# Patient Record
Sex: FEMALE | Race: WHITE | NOT HISPANIC OR LATINO | Employment: OTHER | URBAN - METROPOLITAN AREA
[De-identification: names, ages, dates, MRNs, and addresses within clinical notes are randomized per-mention and may not be internally consistent; named-entity substitution may affect disease eponyms.]

---

## 2017-03-06 ENCOUNTER — CONVERSION ENCOUNTER (OUTPATIENT)
Dept: MAMMOGRAPHY | Facility: HOSPITAL | Age: 82
End: 2017-03-06

## 2018-02-28 ENCOUNTER — OFFICE VISIT CONVERTED (OUTPATIENT)
Dept: INTERNAL MEDICINE | Facility: CLINIC | Age: 83
End: 2018-02-28
Attending: INTERNAL MEDICINE

## 2018-04-24 ENCOUNTER — CONVERSION ENCOUNTER (OUTPATIENT)
Dept: MAMMOGRAPHY | Facility: HOSPITAL | Age: 83
End: 2018-04-24

## 2018-04-24 ENCOUNTER — OFFICE VISIT CONVERTED (OUTPATIENT)
Dept: CARDIOLOGY | Facility: CLINIC | Age: 83
End: 2018-04-24
Attending: NURSE PRACTITIONER

## 2018-05-24 ENCOUNTER — OFFICE VISIT CONVERTED (OUTPATIENT)
Dept: INTERNAL MEDICINE | Facility: CLINIC | Age: 83
End: 2018-05-24
Attending: INTERNAL MEDICINE

## 2018-06-11 ENCOUNTER — OFFICE VISIT CONVERTED (OUTPATIENT)
Dept: CARDIOLOGY | Facility: CLINIC | Age: 83
End: 2018-06-11
Attending: INTERNAL MEDICINE

## 2018-08-27 ENCOUNTER — OFFICE VISIT CONVERTED (OUTPATIENT)
Dept: INTERNAL MEDICINE | Facility: CLINIC | Age: 83
End: 2018-08-27
Attending: INTERNAL MEDICINE

## 2018-12-18 ENCOUNTER — OFFICE VISIT CONVERTED (OUTPATIENT)
Dept: CARDIOLOGY | Facility: CLINIC | Age: 83
End: 2018-12-18
Attending: NURSE PRACTITIONER

## 2018-12-20 ENCOUNTER — OFFICE VISIT CONVERTED (OUTPATIENT)
Dept: INTERNAL MEDICINE | Facility: CLINIC | Age: 83
End: 2018-12-20
Attending: NURSE PRACTITIONER

## 2018-12-20 ENCOUNTER — CONVERSION ENCOUNTER (OUTPATIENT)
Dept: INTERNAL MEDICINE | Facility: CLINIC | Age: 83
End: 2018-12-20

## 2019-01-08 ENCOUNTER — HOSPITAL ENCOUNTER (OUTPATIENT)
Dept: CT IMAGING | Facility: HOSPITAL | Age: 84
Discharge: HOME | End: 2019-01-08
Attending: NURSE PRACTITIONER

## 2019-01-10 ENCOUNTER — HOSPITAL ENCOUNTER (OUTPATIENT)
Dept: CARDIOLOGY | Facility: HOSPITAL | Age: 84
Discharge: HOME | End: 2019-01-10
Attending: SURGERY

## 2019-02-18 ENCOUNTER — HOSPITAL ENCOUNTER (OUTPATIENT)
Dept: OTHER | Facility: HOSPITAL | Age: 84
Discharge: HOME | End: 2019-02-18
Attending: INTERNAL MEDICINE

## 2019-02-18 LAB
ALBUMIN SERPL-MCNC: 4.2 G/DL (ref 3.5–5)
ALBUMIN/GLOB SERPL: 1.4 {RATIO} (ref 1.4–2.6)
ALP SERPL-CCNC: 58 U/L (ref 43–160)
ALT SERPL-CCNC: 14 U/L (ref 10–40)
ANION GAP SERPL CALC-SCNC: 18 MMOL/L (ref 8–19)
AST SERPL-CCNC: 18 U/L (ref 15–50)
BASOPHILS # BLD AUTO: 0.06 10*3/UL (ref 0–0.2)
BASOPHILS NFR BLD AUTO: 1 % (ref 0–3)
BILIRUB SERPL-MCNC: 0.28 MG/DL (ref 0.2–1.3)
BUN SERPL-MCNC: 30 MG/DL (ref 5–25)
BUN/CREAT SERPL: 20 {RATIO} (ref 6–20)
CALCIUM SERPL-MCNC: 9.6 MG/DL (ref 8.7–10.4)
CHLORIDE SERPL-SCNC: 105 MMOL/L (ref 99–111)
CHOLEST SERPL-MCNC: 161 MG/DL (ref 107–200)
CHOLEST/HDLC SERPL: 3.3 {RATIO} (ref 3–6)
CONV ABS IMM GRAN: 0.02 10*3/UL (ref 0–0.2)
CONV CO2: 26 MMOL/L (ref 22–32)
CONV CREATININE URINE, RANDOM: 107 MG/DL (ref 10–300)
CONV IMMATURE GRAN: 0.3 % (ref 0–1.8)
CONV MICROALBUM.,U,RANDOM: 19.8 MG/L (ref 0–20)
CONV TOTAL PROTEIN: 7.1 G/DL (ref 6.3–8.2)
CREAT UR-MCNC: 1.48 MG/DL (ref 0.5–0.9)
DEPRECATED RDW RBC AUTO: 46.5 FL (ref 36.4–46.3)
EOSINOPHIL # BLD AUTO: 0.12 10*3/UL (ref 0–0.7)
EOSINOPHIL # BLD AUTO: 2.1 % (ref 0–7)
ERYTHROCYTE [DISTWIDTH] IN BLOOD BY AUTOMATED COUNT: 13.1 % (ref 11.7–14.4)
EST. AVERAGE GLUCOSE BLD GHB EST-MCNC: 137 MG/DL
GFR SERPLBLD BASED ON 1.73 SQ M-ARVRAT: 33 ML/MIN/{1.73_M2}
GLOBULIN UR ELPH-MCNC: 2.9 G/DL (ref 2–3.5)
GLUCOSE SERPL-MCNC: 123 MG/DL (ref 65–99)
HBA1C MFR BLD: 11.3 G/DL (ref 12–16)
HBA1C MFR BLD: 6.4 % (ref 3.5–5.7)
HCT VFR BLD AUTO: 36.1 % (ref 37–47)
HDLC SERPL-MCNC: 49 MG/DL (ref 40–60)
LDLC SERPL CALC-MCNC: 85 MG/DL (ref 70–100)
LYMPHOCYTES # BLD AUTO: 0.95 10*3/UL (ref 1–5)
MCH RBC QN AUTO: 30.2 PG (ref 27–31)
MCHC RBC AUTO-ENTMCNC: 31.3 G/DL (ref 33–37)
MCV RBC AUTO: 96.5 FL (ref 81–99)
MICROALBUMIN/CREAT UR: 18.5 MG/G{CRE} (ref 0–35)
MONOCYTES # BLD AUTO: 0.76 10*3/UL (ref 0.2–1.2)
MONOCYTES NFR BLD AUTO: 13.3 % (ref 3–10)
NEUTROPHILS # BLD AUTO: 3.82 10*3/UL (ref 2–8)
NEUTROPHILS NFR BLD AUTO: 66.7 % (ref 30–85)
NRBC CBCN: 0 % (ref 0–0.7)
OSMOLALITY SERPL CALC.SUM OF ELEC: 306 MOSM/KG (ref 273–304)
PLATELET # BLD AUTO: 380 10*3/UL (ref 130–400)
PMV BLD AUTO: 10.1 FL (ref 9.4–12.3)
POTASSIUM SERPL-SCNC: 4.9 MMOL/L (ref 3.5–5.3)
RBC # BLD AUTO: 3.74 10*6/UL (ref 4.2–5.4)
SODIUM SERPL-SCNC: 144 MMOL/L (ref 135–147)
TRIGL SERPL-MCNC: 135 MG/DL (ref 40–150)
VARIANT LYMPHS NFR BLD MANUAL: 16.6 % (ref 20–45)
VLDLC SERPL-MCNC: 27 MG/DL (ref 5–37)
WBC # BLD AUTO: 5.73 10*3/UL (ref 4.8–10.8)

## 2019-02-21 LAB
CONV EBV EARLY ANTIGEN: <5 U/ML (ref 0–10.9)
CONV EBV NUCLEAR ANTIGEN: 221 U/ML (ref 0–21.9)
CONV EPSTEIN BARR VIRAL CAPSID IGM: <10 U/ML (ref 0–43.9)
CONV EPSTEIN BARR VIRUS ANTIBODY TO VIRAL CAPSID IGG: 724 U/ML (ref 0–21.9)

## 2019-02-27 ENCOUNTER — HOSPITAL ENCOUNTER (OUTPATIENT)
Dept: OTHER | Facility: HOSPITAL | Age: 84
Discharge: HOME | End: 2019-02-27
Attending: INTERNAL MEDICINE

## 2019-02-27 ENCOUNTER — OFFICE VISIT CONVERTED (OUTPATIENT)
Dept: INTERNAL MEDICINE | Facility: CLINIC | Age: 84
End: 2019-02-27
Attending: INTERNAL MEDICINE

## 2019-03-12 ENCOUNTER — CONVERSION ENCOUNTER (OUTPATIENT)
Dept: OTHER | Facility: HOSPITAL | Age: 84
End: 2019-03-12

## 2019-03-12 ENCOUNTER — OFFICE VISIT CONVERTED (OUTPATIENT)
Dept: INTERNAL MEDICINE | Facility: CLINIC | Age: 84
End: 2019-03-12
Attending: PHYSICIAN ASSISTANT

## 2019-03-12 ENCOUNTER — HOSPITAL ENCOUNTER (OUTPATIENT)
Dept: OTHER | Facility: HOSPITAL | Age: 84
Discharge: HOME | End: 2019-03-12
Attending: PHYSICIAN ASSISTANT

## 2019-03-12 LAB
BASOPHILS # BLD AUTO: 0.06 10*3/UL (ref 0–0.2)
BASOPHILS NFR BLD AUTO: 0.7 % (ref 0–3)
CK SERPL-CCNC: 49 U/L (ref 35–230)
CONV ABS IMM GRAN: 0.05 10*3/UL (ref 0–0.2)
CONV IMMATURE GRAN: 0.5 % (ref 0–1.8)
DEPRECATED RDW RBC AUTO: 44.5 FL (ref 36.4–46.3)
EOSINOPHIL # BLD AUTO: 0.11 10*3/UL (ref 0–0.7)
EOSINOPHIL # BLD AUTO: 1.2 % (ref 0–7)
ERYTHROCYTE [DISTWIDTH] IN BLOOD BY AUTOMATED COUNT: 13.1 % (ref 11.7–14.4)
FOLATE SERPL-MCNC: 9.9 NG/ML (ref 4.8–20)
HBA1C MFR BLD: 10.8 G/DL (ref 12–16)
HCT VFR BLD AUTO: 34 % (ref 37–47)
IRON SATN MFR SERPL: 24 % (ref 20–55)
IRON SERPL-MCNC: 77 UG/DL (ref 60–170)
LYMPHOCYTES # BLD AUTO: 0.92 10*3/UL (ref 1–5)
MCH RBC QN AUTO: 30.2 PG (ref 27–31)
MCHC RBC AUTO-ENTMCNC: 31.8 G/DL (ref 33–37)
MCV RBC AUTO: 95 FL (ref 81–99)
MONOCYTES # BLD AUTO: 0.92 10*3/UL (ref 0.2–1.2)
MONOCYTES NFR BLD AUTO: 10.1 % (ref 3–10)
NEUTROPHILS # BLD AUTO: 7.06 10*3/UL (ref 2–8)
NEUTROPHILS NFR BLD AUTO: 77.4 % (ref 30–85)
NRBC CBCN: 0 % (ref 0–0.7)
PLATELET # BLD AUTO: 377 10*3/UL (ref 130–400)
PMV BLD AUTO: 10 FL (ref 9.4–12.3)
RBC # BLD AUTO: 3.58 10*6/UL (ref 4.2–5.4)
TIBC SERPL-MCNC: 316 UG/DL (ref 245–450)
TRANSFERRIN SERPL-MCNC: 221 MG/DL (ref 250–380)
TSH SERPL-ACNC: 1.31 M[IU]/L (ref 0.27–4.2)
VARIANT LYMPHS NFR BLD MANUAL: 10.1 % (ref 20–45)
VIT B12 SERPL-MCNC: 904 PG/ML (ref 211–911)
WBC # BLD AUTO: 9.12 10*3/UL (ref 4.8–10.8)

## 2019-03-14 LAB — BACTERIA UR CULT: NORMAL

## 2019-04-15 ENCOUNTER — OFFICE VISIT CONVERTED (OUTPATIENT)
Dept: INTERNAL MEDICINE | Facility: CLINIC | Age: 84
End: 2019-04-15
Attending: INTERNAL MEDICINE

## 2019-04-15 ENCOUNTER — CONVERSION ENCOUNTER (OUTPATIENT)
Dept: INTERNAL MEDICINE | Facility: CLINIC | Age: 84
End: 2019-04-15

## 2019-04-25 ENCOUNTER — OFFICE VISIT CONVERTED (OUTPATIENT)
Dept: CARDIOLOGY | Facility: CLINIC | Age: 84
End: 2019-04-25
Attending: INTERNAL MEDICINE

## 2019-04-25 ENCOUNTER — CONVERSION ENCOUNTER (OUTPATIENT)
Dept: CARDIOLOGY | Facility: CLINIC | Age: 84
End: 2019-04-25

## 2019-05-01 ENCOUNTER — HOSPITAL ENCOUNTER (OUTPATIENT)
Dept: CT IMAGING | Facility: HOSPITAL | Age: 84
Discharge: HOME | End: 2019-05-01
Attending: INTERNAL MEDICINE

## 2019-05-03 ENCOUNTER — CONVERSION ENCOUNTER (OUTPATIENT)
Dept: INTERNAL MEDICINE | Facility: CLINIC | Age: 84
End: 2019-05-03

## 2019-05-03 ENCOUNTER — HOSPITAL ENCOUNTER (OUTPATIENT)
Dept: OTHER | Facility: HOSPITAL | Age: 84
Discharge: HOME | End: 2019-05-03
Attending: PHYSICIAN ASSISTANT

## 2019-05-03 ENCOUNTER — OFFICE VISIT CONVERTED (OUTPATIENT)
Dept: INTERNAL MEDICINE | Facility: CLINIC | Age: 84
End: 2019-05-03
Attending: PHYSICIAN ASSISTANT

## 2019-05-03 LAB
BASOPHILS # BLD AUTO: 0.03 10*3/UL (ref 0–0.2)
BASOPHILS NFR BLD AUTO: 0.4 % (ref 0–3)
CONV ABS IMM GRAN: 0.02 10*3/UL (ref 0–0.2)
CONV IMMATURE GRAN: 0.2 % (ref 0–1.8)
CRP SERPL-MCNC: 4.4 MG/L (ref 0–5)
DEPRECATED RDW RBC AUTO: 53 FL (ref 36.4–46.3)
EOSINOPHIL # BLD AUTO: 0.13 10*3/UL (ref 0–0.7)
EOSINOPHIL # BLD AUTO: 1.6 % (ref 0–7)
ERYTHROCYTE [DISTWIDTH] IN BLOOD BY AUTOMATED COUNT: 15.3 % (ref 11.7–14.4)
ERYTHROCYTE [SEDIMENTATION RATE] IN BLOOD: 58 MM/H (ref 0–30)
HBA1C MFR BLD: 10.7 G/DL (ref 12–16)
HCT VFR BLD AUTO: 34.8 % (ref 37–47)
IRON SATN MFR SERPL: 23 % (ref 20–55)
IRON SERPL-MCNC: 58 UG/DL (ref 60–170)
LYMPHOCYTES # BLD AUTO: 0.87 10*3/UL (ref 1–5)
MCH RBC QN AUTO: 29.1 PG (ref 27–31)
MCHC RBC AUTO-ENTMCNC: 30.7 G/DL (ref 33–37)
MCV RBC AUTO: 94.6 FL (ref 81–99)
MONOCYTES # BLD AUTO: 0.77 10*3/UL (ref 0.2–1.2)
MONOCYTES NFR BLD AUTO: 9.4 % (ref 3–10)
NEUTROPHILS # BLD AUTO: 6.33 10*3/UL (ref 2–8)
NEUTROPHILS NFR BLD AUTO: 77.7 % (ref 30–85)
NRBC CBCN: 0 % (ref 0–0.7)
PLATELET # BLD AUTO: 315 10*3/UL (ref 130–400)
PMV BLD AUTO: 9.8 FL (ref 9.4–12.3)
RBC # BLD AUTO: 3.68 10*6/UL (ref 4.2–5.4)
TIBC SERPL-MCNC: 250 UG/DL (ref 245–450)
TRANSFERRIN SERPL-MCNC: 175 MG/DL (ref 250–380)
TSH SERPL-ACNC: 3.32 M[IU]/L (ref 0.27–4.2)
VARIANT LYMPHS NFR BLD MANUAL: 10.7 % (ref 20–45)
WBC # BLD AUTO: 8.15 10*3/UL (ref 4.8–10.8)

## 2019-05-05 LAB — CONV CELIAC DISEASE AB-IGA: 287 MG/DL (ref 68–408)

## 2019-05-06 LAB
CONV ANTI NUCLEAR ANTIBODY WITH REFLEX: NEGATIVE
TTG IGA SER-ACNC: 1 U/ML (ref 0–3)

## 2019-05-09 ENCOUNTER — HOSPITAL ENCOUNTER (OUTPATIENT)
Dept: ULTRASOUND IMAGING | Facility: HOSPITAL | Age: 84
Discharge: HOME | End: 2019-05-09
Attending: PHYSICIAN ASSISTANT

## 2019-05-16 ENCOUNTER — OFFICE VISIT CONVERTED (OUTPATIENT)
Dept: GASTROENTEROLOGY | Facility: CLINIC | Age: 84
End: 2019-05-16
Attending: PHYSICIAN ASSISTANT

## 2019-05-28 ENCOUNTER — HOSPITAL ENCOUNTER (OUTPATIENT)
Dept: OTHER | Facility: HOSPITAL | Age: 84
Discharge: HOME | End: 2019-05-28
Attending: INTERNAL MEDICINE

## 2019-05-28 LAB
25(OH)D3 SERPL-MCNC: 33.5 NG/ML (ref 30–100)
ALBUMIN SERPL-MCNC: 4.1 G/DL (ref 3.5–5)
ANION GAP SERPL CALC-SCNC: 17 MMOL/L (ref 8–19)
BASOPHILS # BLD AUTO: 0.06 10*3/UL (ref 0–0.2)
BASOPHILS NFR BLD AUTO: 0.9 % (ref 0–3)
BUN SERPL-MCNC: 21 MG/DL (ref 5–25)
BUN/CREAT SERPL: 16 {RATIO} (ref 6–20)
CALCIUM SERPL-MCNC: 9.2 MG/DL (ref 8.7–10.4)
CHLORIDE SERPL-SCNC: 104 MMOL/L (ref 99–111)
CONV ABS IMM GRAN: 0.03 10*3/UL (ref 0–0.2)
CONV CO2: 25 MMOL/L (ref 22–32)
CONV IMMATURE GRAN: 0.5 % (ref 0–1.8)
CREAT UR-MCNC: 1.31 MG/DL (ref 0.5–0.9)
DEPRECATED RDW RBC AUTO: 57.3 FL (ref 36.4–46.3)
EOSINOPHIL # BLD AUTO: 0.19 10*3/UL (ref 0–0.7)
EOSINOPHIL # BLD AUTO: 2.9 % (ref 0–7)
ERYTHROCYTE [DISTWIDTH] IN BLOOD BY AUTOMATED COUNT: 16 % (ref 11.7–14.4)
GFR SERPLBLD BASED ON 1.73 SQ M-ARVRAT: 38 ML/MIN/{1.73_M2}
GLUCOSE SERPL-MCNC: 98 MG/DL (ref 65–99)
HBA1C MFR BLD: 11.7 G/DL (ref 12–16)
HCT VFR BLD AUTO: 38.4 % (ref 37–47)
LYMPHOCYTES # BLD AUTO: 1.11 10*3/UL (ref 1–5)
MCH RBC QN AUTO: 29.2 PG (ref 27–31)
MCHC RBC AUTO-ENTMCNC: 30.5 G/DL (ref 33–37)
MCV RBC AUTO: 95.8 FL (ref 81–99)
MONOCYTES # BLD AUTO: 0.87 10*3/UL (ref 0.2–1.2)
MONOCYTES NFR BLD AUTO: 13.4 % (ref 3–10)
NEUTROPHILS # BLD AUTO: 4.25 10*3/UL (ref 2–8)
NEUTROPHILS NFR BLD AUTO: 65.2 % (ref 30–85)
NRBC CBCN: 0 % (ref 0–0.7)
PHOSPHATE SERPL-MCNC: 4.4 MG/DL (ref 2.4–4.5)
PLATELET # BLD AUTO: 323 10*3/UL (ref 130–400)
PMV BLD AUTO: 9.6 FL (ref 9.4–12.3)
POTASSIUM SERPL-SCNC: 5.2 MMOL/L (ref 3.5–5.3)
PTH-INTACT SERPL-MCNC: 51.2 PG/ML (ref 11.1–79.5)
RBC # BLD AUTO: 4.01 10*6/UL (ref 4.2–5.4)
SODIUM SERPL-SCNC: 141 MMOL/L (ref 135–147)
VARIANT LYMPHS NFR BLD MANUAL: 17.1 % (ref 20–45)
WBC # BLD AUTO: 6.51 10*3/UL (ref 4.8–10.8)

## 2019-05-29 ENCOUNTER — OFFICE VISIT CONVERTED (OUTPATIENT)
Dept: INTERNAL MEDICINE | Facility: CLINIC | Age: 84
End: 2019-05-29
Attending: PHYSICIAN ASSISTANT

## 2019-05-29 LAB
APPEARANCE UR: CLEAR
BILIRUB UR QL: NEGATIVE
COLOR UR: YELLOW
CONV BACTERIA: NEGATIVE
CONV COLLECTION SOURCE (UA): ABNORMAL
CONV CREATININE URINE, RANDOM: 62 MG/DL (ref 10–300)
CONV HYALINE CASTS IN URINE MICRO: ABNORMAL /[LPF]
CONV UROBILINOGEN IN URINE BY AUTOMATED TEST STRIP: 0.2 {EHRLICHU}/DL (ref 0.1–1)
GLUCOSE UR QL: NEGATIVE MG/DL
HGB UR QL STRIP: ABNORMAL
KETONES UR QL STRIP: NEGATIVE MG/DL
LEUKOCYTE ESTERASE UR QL STRIP: ABNORMAL
NITRITE UR QL STRIP: NEGATIVE
PH UR STRIP.AUTO: 6.5 [PH] (ref 5–8)
PROT UR QL: NEGATIVE MG/DL
PROT UR-MCNC: 6.2 MG/DL
RBC #/AREA URNS HPF: ABNORMAL /[HPF]
SP GR UR: 1.01 (ref 1–1.03)
SQUAMOUS SPT QL MICRO: ABNORMAL /[HPF]
WBC #/AREA URNS HPF: ABNORMAL /[HPF]

## 2019-06-06 ENCOUNTER — HOSPITAL ENCOUNTER (OUTPATIENT)
Dept: GASTROENTEROLOGY | Facility: HOSPITAL | Age: 84
Setting detail: HOSPITAL OUTPATIENT SURGERY
Discharge: HOME | End: 2019-06-06
Attending: INTERNAL MEDICINE

## 2019-06-06 LAB — GLUCOSE BLD-MCNC: 114 MG/DL (ref 65–99)

## 2019-07-03 ENCOUNTER — HOSPITAL ENCOUNTER (OUTPATIENT)
Dept: MAMMOGRAPHY | Facility: HOSPITAL | Age: 84
Discharge: HOME | End: 2019-07-03
Attending: PHYSICIAN ASSISTANT

## 2019-08-01 ENCOUNTER — OFFICE VISIT CONVERTED (OUTPATIENT)
Dept: GASTROENTEROLOGY | Facility: CLINIC | Age: 84
End: 2019-08-01
Attending: PHYSICIAN ASSISTANT

## 2019-08-06 ENCOUNTER — HOSPITAL ENCOUNTER (OUTPATIENT)
Dept: OTHER | Facility: HOSPITAL | Age: 84
Discharge: HOME | End: 2019-08-06
Attending: PHYSICIAN ASSISTANT

## 2019-08-06 LAB
ALBUMIN SERPL-MCNC: 4.3 G/DL (ref 3.5–5)
ALBUMIN/GLOB SERPL: 1.6 {RATIO} (ref 1.4–2.6)
ALP SERPL-CCNC: 107 U/L (ref 43–160)
ALT SERPL-CCNC: 13 U/L (ref 10–40)
ANION GAP SERPL CALC-SCNC: 21 MMOL/L (ref 8–19)
AST SERPL-CCNC: 16 U/L (ref 15–50)
BASOPHILS # BLD AUTO: 0.07 10*3/UL (ref 0–0.2)
BASOPHILS NFR BLD AUTO: 1.2 % (ref 0–3)
BILIRUB SERPL-MCNC: 0.21 MG/DL (ref 0.2–1.3)
BUN SERPL-MCNC: 30 MG/DL (ref 5–25)
BUN/CREAT SERPL: 22 {RATIO} (ref 6–20)
CALCIUM SERPL-MCNC: 9.5 MG/DL (ref 8.7–10.4)
CHLORIDE SERPL-SCNC: 104 MMOL/L (ref 99–111)
CHOLEST SERPL-MCNC: 144 MG/DL (ref 107–200)
CHOLEST/HDLC SERPL: 2.3 {RATIO} (ref 3–6)
CONV ABS IMM GRAN: 0.02 10*3/UL (ref 0–0.2)
CONV CO2: 23 MMOL/L (ref 22–32)
CONV IMMATURE GRAN: 0.3 % (ref 0–1.8)
CONV TOTAL PROTEIN: 7 G/DL (ref 6.3–8.2)
CREAT UR-MCNC: 1.35 MG/DL (ref 0.5–0.9)
DEPRECATED RDW RBC AUTO: 47.8 FL (ref 36.4–46.3)
EOSINOPHIL # BLD AUTO: 0.19 10*3/UL (ref 0–0.7)
EOSINOPHIL # BLD AUTO: 3.1 % (ref 0–7)
ERYTHROCYTE [DISTWIDTH] IN BLOOD BY AUTOMATED COUNT: 13.5 % (ref 11.7–14.4)
EST. AVERAGE GLUCOSE BLD GHB EST-MCNC: 120 MG/DL
GFR SERPLBLD BASED ON 1.73 SQ M-ARVRAT: 36 ML/MIN/{1.73_M2}
GLOBULIN UR ELPH-MCNC: 2.7 G/DL (ref 2–3.5)
GLUCOSE SERPL-MCNC: 116 MG/DL (ref 65–99)
HBA1C MFR BLD: 5.8 % (ref 3.5–5.7)
HCT VFR BLD AUTO: 38.7 % (ref 37–47)
HDLC SERPL-MCNC: 63 MG/DL (ref 40–60)
HGB BLD-MCNC: 11.8 G/DL (ref 12–16)
IRON SATN MFR SERPL: 30 % (ref 20–55)
IRON SERPL-MCNC: 95 UG/DL (ref 60–170)
LDLC SERPL CALC-MCNC: 55 MG/DL (ref 70–100)
LYMPHOCYTES # BLD AUTO: 1.06 10*3/UL (ref 1–5)
LYMPHOCYTES NFR BLD AUTO: 17.4 % (ref 20–45)
MCH RBC QN AUTO: 29.7 PG (ref 27–31)
MCHC RBC AUTO-ENTMCNC: 30.5 G/DL (ref 33–37)
MCV RBC AUTO: 97.5 FL (ref 81–99)
MONOCYTES # BLD AUTO: 0.89 10*3/UL (ref 0.2–1.2)
MONOCYTES NFR BLD AUTO: 14.6 % (ref 3–10)
NEUTROPHILS # BLD AUTO: 3.85 10*3/UL (ref 2–8)
NEUTROPHILS NFR BLD AUTO: 63.4 % (ref 30–85)
NRBC CBCN: 0 % (ref 0–0.7)
OSMOLALITY SERPL CALC.SUM OF ELEC: 303 MOSM/KG (ref 273–304)
PLATELET # BLD AUTO: 317 10*3/UL (ref 130–400)
PMV BLD AUTO: 9.7 FL (ref 9.4–12.3)
POTASSIUM SERPL-SCNC: 5.4 MMOL/L (ref 3.5–5.3)
RBC # BLD AUTO: 3.97 10*6/UL (ref 4.2–5.4)
SODIUM SERPL-SCNC: 143 MMOL/L (ref 135–147)
TIBC SERPL-MCNC: 317 UG/DL (ref 245–450)
TRANSFERRIN SERPL-MCNC: 222 MG/DL (ref 250–380)
TRIGL SERPL-MCNC: 128 MG/DL (ref 40–150)
VLDLC SERPL-MCNC: 26 MG/DL (ref 5–37)
WBC # BLD AUTO: 6.08 10*3/UL (ref 4.8–10.8)

## 2019-08-30 ENCOUNTER — CONVERSION ENCOUNTER (OUTPATIENT)
Dept: INTERNAL MEDICINE | Facility: CLINIC | Age: 84
End: 2019-08-30

## 2019-08-30 ENCOUNTER — OFFICE VISIT CONVERTED (OUTPATIENT)
Dept: INTERNAL MEDICINE | Facility: CLINIC | Age: 84
End: 2019-08-30
Attending: INTERNAL MEDICINE

## 2019-09-05 ENCOUNTER — OFFICE VISIT CONVERTED (OUTPATIENT)
Dept: CARDIOLOGY | Facility: CLINIC | Age: 84
End: 2019-09-05
Attending: INTERNAL MEDICINE

## 2019-09-09 ENCOUNTER — HOSPITAL ENCOUNTER (OUTPATIENT)
Dept: OTHER | Facility: HOSPITAL | Age: 84
Discharge: HOME | End: 2019-09-09
Attending: INTERNAL MEDICINE

## 2019-09-09 LAB — BNP SERPL-MCNC: 424 PG/ML (ref 0–1800)

## 2019-09-26 ENCOUNTER — HOSPITAL ENCOUNTER (OUTPATIENT)
Dept: CARDIOLOGY | Facility: HOSPITAL | Age: 84
Discharge: HOME | End: 2019-09-26

## 2019-09-27 ENCOUNTER — HOSPITAL ENCOUNTER (OUTPATIENT)
Dept: OTHER | Facility: HOSPITAL | Age: 84
Discharge: HOME | End: 2019-09-27
Attending: INTERNAL MEDICINE

## 2019-10-08 ENCOUNTER — HOSPITAL ENCOUNTER (OUTPATIENT)
Dept: INFUSION THERAPY | Facility: HOSPITAL | Age: 84
Discharge: HOME | End: 2019-10-08

## 2019-10-08 LAB
CREAT BLD-MCNC: 1.8 MG/DL (ref 0.6–1.4)
GFR SERPLBLD BASED ON 1.73 SQ M-ARVRAT: 26 ML/MIN/{1.73_M2}

## 2019-10-16 ENCOUNTER — HOSPITAL ENCOUNTER (OUTPATIENT)
Dept: MAMMOGRAPHY | Facility: HOSPITAL | Age: 84
Discharge: HOME | End: 2019-10-16
Attending: INTERNAL MEDICINE

## 2019-10-22 ENCOUNTER — HOSPITAL ENCOUNTER (OUTPATIENT)
Dept: CARDIOLOGY | Facility: HOSPITAL | Age: 84
Discharge: HOME | End: 2019-10-22

## 2019-10-28 ENCOUNTER — HOSPITAL ENCOUNTER (OUTPATIENT)
Dept: INFUSION THERAPY | Facility: HOSPITAL | Age: 84
Discharge: HOME | End: 2019-10-28

## 2019-10-28 LAB
BUN SERPL-MCNC: 33 MG/DL (ref 5–25)
CREAT UR-MCNC: 1.92 MG/DL (ref 0.5–0.9)

## 2019-10-29 ENCOUNTER — HOSPITAL ENCOUNTER (OUTPATIENT)
Dept: CARDIOLOGY | Facility: HOSPITAL | Age: 84
Discharge: HOME | End: 2019-10-29

## 2019-11-22 ENCOUNTER — HOSPITAL ENCOUNTER (OUTPATIENT)
Dept: PREADMISSION TESTING | Facility: HOSPITAL | Age: 84
Discharge: HOME | End: 2019-11-22

## 2019-11-22 LAB
ALBUMIN SERPL-MCNC: 4.5 G/DL (ref 3.5–5)
ALBUMIN/GLOB SERPL: 1.7 {RATIO} (ref 1.4–2.6)
ALP SERPL-CCNC: 64 U/L (ref 43–160)
ALT SERPL-CCNC: 13 U/L (ref 10–40)
ANION GAP SERPL CALC-SCNC: 18 MMOL/L (ref 8–19)
APTT BLD: 20.3 S (ref 22.2–34.2)
AST SERPL-CCNC: 21 U/L (ref 15–50)
BASOPHILS # BLD AUTO: 0.08 10*3/UL (ref 0–0.2)
BASOPHILS NFR BLD AUTO: 0.5 % (ref 0–3)
BILIRUB SERPL-MCNC: 0.35 MG/DL (ref 0.2–1.3)
BUN SERPL-MCNC: 31 MG/DL (ref 5–25)
BUN/CREAT SERPL: 17 {RATIO} (ref 6–20)
CALCIUM SERPL-MCNC: 10.3 MG/DL (ref 8.7–10.4)
CHLORIDE SERPL-SCNC: 103 MMOL/L (ref 99–111)
CONV ABS IMM GRAN: 0.05 10*3/UL (ref 0–0.2)
CONV CO2: 25 MMOL/L (ref 22–32)
CONV IMMATURE GRAN: 0.3 % (ref 0–1.8)
CONV TOTAL PROTEIN: 7.2 G/DL (ref 6.3–8.2)
CREAT UR-MCNC: 1.78 MG/DL (ref 0.5–0.9)
DEPRECATED RDW RBC AUTO: 46 FL (ref 36.4–46.3)
EOSINOPHIL # BLD AUTO: 0.14 10*3/UL (ref 0–0.7)
EOSINOPHIL # BLD AUTO: 0.9 % (ref 0–7)
ERYTHROCYTE [DISTWIDTH] IN BLOOD BY AUTOMATED COUNT: 12.3 % (ref 11.7–14.4)
GFR SERPLBLD BASED ON 1.73 SQ M-ARVRAT: 26 ML/MIN/{1.73_M2}
GLOBULIN UR ELPH-MCNC: 2.7 G/DL (ref 2–3.5)
GLUCOSE SERPL-MCNC: 103 MG/DL (ref 65–99)
HCT VFR BLD AUTO: 34.9 % (ref 37–47)
HGB BLD-MCNC: 10.7 G/DL (ref 12–16)
INR PPP: 1 (ref 2–3)
LYMPHOCYTES # BLD AUTO: 0.75 10*3/UL (ref 1–5)
LYMPHOCYTES NFR BLD AUTO: 5 % (ref 20–45)
MCH RBC QN AUTO: 31.2 PG (ref 27–31)
MCHC RBC AUTO-ENTMCNC: 30.7 G/DL (ref 33–37)
MCV RBC AUTO: 101.7 FL (ref 81–99)
MONOCYTES # BLD AUTO: 1.36 10*3/UL (ref 0.2–1.2)
MONOCYTES NFR BLD AUTO: 9.2 % (ref 3–10)
NEUTROPHILS # BLD AUTO: 12.48 10*3/UL (ref 2–8)
NEUTROPHILS NFR BLD AUTO: 84.1 % (ref 30–85)
NRBC CBCN: 0 % (ref 0–0.7)
OSMOLALITY SERPL CALC.SUM OF ELEC: 301 MOSM/KG (ref 273–304)
PLATELET # BLD AUTO: 305 10*3/UL (ref 130–400)
PMV BLD AUTO: 9.3 FL (ref 9.4–12.3)
POTASSIUM SERPL-SCNC: 4.4 MMOL/L (ref 3.5–5.3)
PROTHROMBIN TIME: 10.7 S (ref 9.4–12)
RBC # BLD AUTO: 3.43 10*6/UL (ref 4.2–5.4)
SODIUM SERPL-SCNC: 142 MMOL/L (ref 135–147)
WBC # BLD AUTO: 14.86 10*3/UL (ref 4.8–10.8)

## 2019-12-13 ENCOUNTER — OFFICE VISIT CONVERTED (OUTPATIENT)
Dept: INTERNAL MEDICINE | Facility: CLINIC | Age: 84
End: 2019-12-13
Attending: INTERNAL MEDICINE

## 2019-12-13 ENCOUNTER — CONVERSION ENCOUNTER (OUTPATIENT)
Dept: INTERNAL MEDICINE | Facility: CLINIC | Age: 84
End: 2019-12-13

## 2019-12-19 ENCOUNTER — HOSPITAL ENCOUNTER (OUTPATIENT)
Dept: CARDIOLOGY | Facility: HOSPITAL | Age: 84
Discharge: HOME | End: 2019-12-19

## 2020-01-07 ENCOUNTER — HOSPITAL ENCOUNTER (OUTPATIENT)
Dept: CARDIOLOGY | Facility: HOSPITAL | Age: 85
Discharge: HOME | End: 2020-01-07

## 2020-01-20 ENCOUNTER — HOSPITAL ENCOUNTER (OUTPATIENT)
Dept: CT IMAGING | Facility: HOSPITAL | Age: 85
Discharge: HOME | End: 2020-01-20
Attending: PHYSICIAN ASSISTANT

## 2020-02-26 ENCOUNTER — HOSPITAL ENCOUNTER (OUTPATIENT)
Dept: OTHER | Facility: HOSPITAL | Age: 85
Discharge: HOME | End: 2020-02-26
Attending: INTERNAL MEDICINE

## 2020-02-26 LAB
25(OH)D3 SERPL-MCNC: 24.7 NG/ML (ref 30–100)
ALBUMIN SERPL-MCNC: 3.9 G/DL (ref 3.5–5)
ALBUMIN SERPL-MCNC: 4.2 G/DL (ref 3.5–5)
ALBUMIN/GLOB SERPL: 1.4 {RATIO} (ref 1.4–2.6)
ALP SERPL-CCNC: 64 U/L (ref 43–160)
ALT SERPL-CCNC: 11 U/L (ref 10–40)
ANION GAP SERPL CALC-SCNC: 16 MMOL/L (ref 8–19)
ANION GAP SERPL CALC-SCNC: 17 MMOL/L (ref 8–19)
APPEARANCE UR: CLEAR
AST SERPL-CCNC: 19 U/L (ref 15–50)
BASOPHILS # BLD AUTO: 0.05 10*3/UL (ref 0–0.2)
BASOPHILS NFR BLD AUTO: 0.9 % (ref 0–3)
BILIRUB SERPL-MCNC: 0.24 MG/DL (ref 0.2–1.3)
BILIRUB UR QL: NEGATIVE
BUN SERPL-MCNC: 25 MG/DL (ref 5–25)
BUN SERPL-MCNC: 27 MG/DL (ref 5–25)
BUN/CREAT SERPL: 13 {RATIO} (ref 6–20)
BUN/CREAT SERPL: 14 {RATIO} (ref 6–20)
CALCIUM SERPL-MCNC: 9.6 MG/DL (ref 8.7–10.4)
CALCIUM SERPL-MCNC: 9.8 MG/DL (ref 8.7–10.4)
CHLORIDE SERPL-SCNC: 102 MMOL/L (ref 99–111)
CHLORIDE SERPL-SCNC: 103 MMOL/L (ref 99–111)
CHOLEST SERPL-MCNC: 144 MG/DL (ref 107–200)
CHOLEST/HDLC SERPL: 2.9 {RATIO} (ref 3–6)
COLOR UR: YELLOW
CONV ABS IMM GRAN: 0.02 10*3/UL (ref 0–0.2)
CONV BACTERIA: ABNORMAL
CONV CO2: 25 MMOL/L (ref 22–32)
CONV CO2: 26 MMOL/L (ref 22–32)
CONV COLLECTION SOURCE (UA): ABNORMAL
CONV CREATININE URINE, RANDOM: 76 MG/DL (ref 10–300)
CONV IMMATURE GRAN: 0.4 % (ref 0–1.8)
CONV TOTAL PROTEIN: 6.7 G/DL (ref 6.3–8.2)
CONV UROBILINOGEN IN URINE BY AUTOMATED TEST STRIP: 0.2 {EHRLICHU}/DL (ref 0.1–1)
CREAT UR-MCNC: 1.88 MG/DL (ref 0.5–0.9)
CREAT UR-MCNC: 1.95 MG/DL (ref 0.5–0.9)
DEPRECATED RDW RBC AUTO: 47.7 FL (ref 36.4–46.3)
EOSINOPHIL # BLD AUTO: 0.15 10*3/UL (ref 0–0.7)
EOSINOPHIL # BLD AUTO: 2.7 % (ref 0–7)
ERYTHROCYTE [DISTWIDTH] IN BLOOD BY AUTOMATED COUNT: 13.2 % (ref 11.7–14.4)
EST. AVERAGE GLUCOSE BLD GHB EST-MCNC: 117 MG/DL
GFR SERPLBLD BASED ON 1.73 SQ M-ARVRAT: 23 ML/MIN/{1.73_M2}
GFR SERPLBLD BASED ON 1.73 SQ M-ARVRAT: 24 ML/MIN/{1.73_M2}
GLOBULIN UR ELPH-MCNC: 2.8 G/DL (ref 2–3.5)
GLUCOSE SERPL-MCNC: 94 MG/DL (ref 65–99)
GLUCOSE SERPL-MCNC: 96 MG/DL (ref 65–99)
GLUCOSE UR QL: NEGATIVE MG/DL
HBA1C MFR BLD: 5.7 % (ref 3.5–5.7)
HCT VFR BLD AUTO: 36.6 % (ref 37–47)
HDLC SERPL-MCNC: 49 MG/DL (ref 40–60)
HGB BLD-MCNC: 11 G/DL (ref 12–16)
HGB UR QL STRIP: NEGATIVE
IRON SATN MFR SERPL: 32 % (ref 20–55)
IRON SERPL-MCNC: 123 UG/DL (ref 60–170)
KETONES UR QL STRIP: NEGATIVE MG/DL
LDLC SERPL CALC-MCNC: 67 MG/DL (ref 70–100)
LEUKOCYTE ESTERASE UR QL STRIP: ABNORMAL
LYMPHOCYTES # BLD AUTO: 1.03 10*3/UL (ref 1–5)
LYMPHOCYTES NFR BLD AUTO: 18.6 % (ref 20–45)
MCH RBC QN AUTO: 29.5 PG (ref 27–31)
MCHC RBC AUTO-ENTMCNC: 30.1 G/DL (ref 33–37)
MCV RBC AUTO: 98.1 FL (ref 81–99)
MONOCYTES # BLD AUTO: 0.83 10*3/UL (ref 0.2–1.2)
MONOCYTES NFR BLD AUTO: 15 % (ref 3–10)
NEUTROPHILS # BLD AUTO: 3.45 10*3/UL (ref 2–8)
NEUTROPHILS NFR BLD AUTO: 62.4 % (ref 30–85)
NITRITE UR QL STRIP: NEGATIVE
NRBC CBCN: 0 % (ref 0–0.7)
OSMOLALITY SERPL CALC.SUM OF ELEC: 294 MOSM/KG (ref 273–304)
PH UR STRIP.AUTO: 6 [PH] (ref 5–8)
PHOSPHATE SERPL-MCNC: 3.5 MG/DL (ref 2.4–4.5)
PLATELET # BLD AUTO: 361 10*3/UL (ref 130–400)
PMV BLD AUTO: 9.6 FL (ref 9.4–12.3)
POTASSIUM SERPL-SCNC: 4.6 MMOL/L (ref 3.5–5.3)
POTASSIUM SERPL-SCNC: 4.8 MMOL/L (ref 3.5–5.3)
PROT UR QL: NEGATIVE MG/DL
PROT UR-MCNC: 6.4 MG/DL
PTH-INTACT SERPL-MCNC: 26.3 PG/ML (ref 11.1–79.5)
RBC # BLD AUTO: 3.73 10*6/UL (ref 4.2–5.4)
RBC #/AREA URNS HPF: ABNORMAL /[HPF]
SODIUM SERPL-SCNC: 139 MMOL/L (ref 135–147)
SODIUM SERPL-SCNC: 140 MMOL/L (ref 135–147)
SP GR UR: 1.01 (ref 1–1.03)
SQUAMOUS SPT QL MICRO: ABNORMAL /[HPF]
T4 FREE SERPL-MCNC: 1.4 NG/DL (ref 0.9–1.8)
TIBC SERPL-MCNC: 383 UG/DL (ref 245–450)
TRANSFERRIN SERPL-MCNC: 268 MG/DL (ref 250–380)
TRIGL SERPL-MCNC: 138 MG/DL (ref 40–150)
TSH SERPL-ACNC: 2.33 M[IU]/L (ref 0.27–4.2)
VLDLC SERPL-MCNC: 28 MG/DL (ref 5–37)
WBC # BLD AUTO: 5.53 10*3/UL (ref 4.8–10.8)
WBC #/AREA URNS HPF: ABNORMAL /[HPF]

## 2020-02-27 ENCOUNTER — HOSPITAL ENCOUNTER (OUTPATIENT)
Dept: CARDIOLOGY | Facility: HOSPITAL | Age: 85
Discharge: HOME | End: 2020-02-27
Attending: SURGERY

## 2020-03-02 ENCOUNTER — OFFICE VISIT CONVERTED (OUTPATIENT)
Dept: INTERNAL MEDICINE | Facility: CLINIC | Age: 85
End: 2020-03-02
Attending: INTERNAL MEDICINE

## 2020-03-02 ENCOUNTER — CONVERSION ENCOUNTER (OUTPATIENT)
Dept: INTERNAL MEDICINE | Facility: CLINIC | Age: 85
End: 2020-03-02

## 2020-03-04 ENCOUNTER — HOSPITAL ENCOUNTER (OUTPATIENT)
Dept: INFUSION THERAPY | Facility: HOSPITAL | Age: 85
Discharge: HOME | End: 2020-03-04
Attending: SURGERY

## 2020-03-18 ENCOUNTER — HOSPITAL ENCOUNTER (OUTPATIENT)
Dept: LAB | Facility: HOSPITAL | Age: 85
Discharge: HOME | End: 2020-03-18
Attending: INTERNAL MEDICINE

## 2020-03-18 LAB
ANION GAP SERPL CALC-SCNC: 17 MMOL/L (ref 8–19)
BUN SERPL-MCNC: 29 MG/DL (ref 5–25)
BUN/CREAT SERPL: 15 {RATIO} (ref 6–20)
CALCIUM SERPL-MCNC: 9.3 MG/DL (ref 8.7–10.4)
CHLORIDE SERPL-SCNC: 105 MMOL/L (ref 99–111)
CONV CO2: 25 MMOL/L (ref 22–32)
CREAT UR-MCNC: 1.95 MG/DL (ref 0.5–0.9)
GFR SERPLBLD BASED ON 1.73 SQ M-ARVRAT: 23 ML/MIN/{1.73_M2}
GLUCOSE SERPL-MCNC: 98 MG/DL (ref 65–99)
OSMOLALITY SERPL CALC.SUM OF ELEC: 300 MOSM/KG (ref 273–304)
POTASSIUM SERPL-SCNC: 4.7 MMOL/L (ref 3.5–5.3)
SODIUM SERPL-SCNC: 142 MMOL/L (ref 135–147)

## 2020-03-27 ENCOUNTER — OFFICE VISIT CONVERTED (OUTPATIENT)
Dept: INTERNAL MEDICINE | Facility: CLINIC | Age: 85
End: 2020-03-27
Attending: INTERNAL MEDICINE

## 2020-04-02 ENCOUNTER — OFFICE VISIT CONVERTED (OUTPATIENT)
Dept: INTERNAL MEDICINE | Facility: CLINIC | Age: 85
End: 2020-04-02
Attending: NURSE PRACTITIONER

## 2020-04-02 ENCOUNTER — HOSPITAL ENCOUNTER (OUTPATIENT)
Dept: OTHER | Facility: HOSPITAL | Age: 85
Discharge: HOME | End: 2020-04-02
Attending: NURSE PRACTITIONER

## 2020-04-23 ENCOUNTER — TELEPHONE CONVERTED (OUTPATIENT)
Dept: CARDIOLOGY | Facility: CLINIC | Age: 85
End: 2020-04-23
Attending: INTERNAL MEDICINE

## 2020-06-10 ENCOUNTER — HOSPITAL ENCOUNTER (OUTPATIENT)
Dept: LAB | Facility: HOSPITAL | Age: 85
Discharge: HOME | End: 2020-06-10
Attending: INTERNAL MEDICINE

## 2020-06-10 LAB
ALBUMIN SERPL-MCNC: 4.4 G/DL (ref 3.5–5)
ANION GAP SERPL CALC-SCNC: 19 MMOL/L (ref 8–19)
APPEARANCE UR: CLEAR
BASOPHILS # BLD AUTO: 0.06 10*3/UL (ref 0–0.2)
BASOPHILS NFR BLD AUTO: 1 % (ref 0–3)
BILIRUB UR QL: NEGATIVE
BUN SERPL-MCNC: 37 MG/DL (ref 5–25)
BUN/CREAT SERPL: 17 {RATIO} (ref 6–20)
CALCIUM SERPL-MCNC: 9.4 MG/DL (ref 8.7–10.4)
CHLORIDE SERPL-SCNC: 105 MMOL/L (ref 99–111)
COLOR UR: YELLOW
CONV ABS IMM GRAN: 0.03 10*3/UL (ref 0–0.2)
CONV CO2: 22 MMOL/L (ref 22–32)
CONV COLLECTION SOURCE (UA): NORMAL
CONV CREATININE URINE, RANDOM: 32.4 MG/DL (ref 10–300)
CONV IMMATURE GRAN: 0.5 % (ref 0–1.8)
CONV PROTEIN TO CREATININE RATIO (RANDOM URINE): 0.12 {RATIO} (ref 0–0.1)
CONV UROBILINOGEN IN URINE BY AUTOMATED TEST STRIP: 0.2 {EHRLICHU}/DL (ref 0.1–1)
CREAT UR-MCNC: 2.21 MG/DL (ref 0.5–0.9)
DEPRECATED RDW RBC AUTO: 48.6 FL (ref 36.4–46.3)
EOSINOPHIL # BLD AUTO: 0.16 10*3/UL (ref 0–0.7)
EOSINOPHIL # BLD AUTO: 2.7 % (ref 0–7)
ERYTHROCYTE [DISTWIDTH] IN BLOOD BY AUTOMATED COUNT: 13 % (ref 11.7–14.4)
GFR SERPLBLD BASED ON 1.73 SQ M-ARVRAT: 20 ML/MIN/{1.73_M2}
GLUCOSE SERPL-MCNC: 110 MG/DL (ref 65–99)
GLUCOSE UR QL: NEGATIVE MG/DL
HCT VFR BLD AUTO: 33.3 % (ref 37–47)
HGB BLD-MCNC: 10 G/DL (ref 12–16)
HGB UR QL STRIP: NEGATIVE
KETONES UR QL STRIP: NEGATIVE MG/DL
LEUKOCYTE ESTERASE UR QL STRIP: NEGATIVE
LYMPHOCYTES # BLD AUTO: 0.87 10*3/UL (ref 1–5)
LYMPHOCYTES NFR BLD AUTO: 14.7 % (ref 20–45)
MCH RBC QN AUTO: 30.5 PG (ref 27–31)
MCHC RBC AUTO-ENTMCNC: 30 G/DL (ref 33–37)
MCV RBC AUTO: 101.5 FL (ref 81–99)
MONOCYTES # BLD AUTO: 0.89 10*3/UL (ref 0.2–1.2)
MONOCYTES NFR BLD AUTO: 15.1 % (ref 3–10)
NEUTROPHILS # BLD AUTO: 3.89 10*3/UL (ref 2–8)
NEUTROPHILS NFR BLD AUTO: 66 % (ref 30–85)
NITRITE UR QL STRIP: NEGATIVE
NRBC CBCN: 0 % (ref 0–0.7)
PH UR STRIP.AUTO: 7.5 [PH] (ref 5–8)
PHOSPHATE SERPL-MCNC: 4.3 MG/DL (ref 2.4–4.5)
PLATELET # BLD AUTO: 331 10*3/UL (ref 130–400)
PMV BLD AUTO: 10.1 FL (ref 9.4–12.3)
POTASSIUM SERPL-SCNC: 6.1 MMOL/L (ref 3.5–5.3)
PROT UR QL: NEGATIVE MG/DL
PROT UR-MCNC: <4 MG/DL
RBC # BLD AUTO: 3.28 10*6/UL (ref 4.2–5.4)
SODIUM SERPL-SCNC: 140 MMOL/L (ref 135–147)
SP GR UR: 1.01 (ref 1–1.03)
WBC # BLD AUTO: 5.9 10*3/UL (ref 4.8–10.8)

## 2020-06-15 ENCOUNTER — HOSPITAL ENCOUNTER (OUTPATIENT)
Dept: OTHER | Facility: HOSPITAL | Age: 85
Discharge: HOME | End: 2020-06-15
Attending: INTERNAL MEDICINE

## 2020-06-15 LAB
ANION GAP SERPL CALC-SCNC: 17 MMOL/L (ref 8–19)
BUN SERPL-MCNC: 31 MG/DL (ref 5–25)
BUN/CREAT SERPL: 15 {RATIO} (ref 6–20)
CALCIUM SERPL-MCNC: 9.5 MG/DL (ref 8.7–10.4)
CHLORIDE SERPL-SCNC: 106 MMOL/L (ref 99–111)
CONV CO2: 24 MMOL/L (ref 22–32)
CREAT UR-MCNC: 2.13 MG/DL (ref 0.5–0.9)
GFR SERPLBLD BASED ON 1.73 SQ M-ARVRAT: 21 ML/MIN/{1.73_M2}
GLUCOSE SERPL-MCNC: 94 MG/DL (ref 65–99)
OSMOLALITY SERPL CALC.SUM OF ELEC: 298 MOSM/KG (ref 273–304)
POTASSIUM SERPL-SCNC: 5.5 MMOL/L (ref 3.5–5.3)
SODIUM SERPL-SCNC: 141 MMOL/L (ref 135–147)

## 2020-07-23 ENCOUNTER — HOSPITAL ENCOUNTER (OUTPATIENT)
Dept: CARDIOLOGY | Facility: HOSPITAL | Age: 85
Discharge: HOME | End: 2020-07-23
Attending: SURGERY

## 2020-08-25 ENCOUNTER — HOSPITAL ENCOUNTER (OUTPATIENT)
Dept: OTHER | Facility: HOSPITAL | Age: 85
Discharge: HOME | End: 2020-08-25
Attending: INTERNAL MEDICINE

## 2020-08-25 LAB
25(OH)D3 SERPL-MCNC: 26.2 NG/ML (ref 30–100)
ALBUMIN SERPL-MCNC: 4.1 G/DL (ref 3.5–5)
ALBUMIN/GLOB SERPL: 1.7 {RATIO} (ref 1.4–2.6)
ALP SERPL-CCNC: 67 U/L (ref 43–160)
ALT SERPL-CCNC: 12 U/L (ref 10–40)
ANION GAP SERPL CALC-SCNC: 15 MMOL/L (ref 8–19)
APPEARANCE UR: CLEAR
AST SERPL-CCNC: 20 U/L (ref 15–50)
BASOPHILS # BLD AUTO: 0.05 10*3/UL (ref 0–0.2)
BASOPHILS NFR BLD AUTO: 0.9 % (ref 0–3)
BILIRUB SERPL-MCNC: 0.26 MG/DL (ref 0.2–1.3)
BILIRUB UR QL: NEGATIVE
BUN SERPL-MCNC: 22 MG/DL (ref 5–25)
BUN/CREAT SERPL: 12 {RATIO} (ref 6–20)
CALCIUM SERPL-MCNC: 9.9 MG/DL (ref 8.7–10.4)
CHLORIDE SERPL-SCNC: 106 MMOL/L (ref 99–111)
CHOLEST SERPL-MCNC: 156 MG/DL (ref 107–200)
CHOLEST/HDLC SERPL: 3.1 {RATIO} (ref 3–6)
COLOR UR: YELLOW
CONV ABS IMM GRAN: 0.03 10*3/UL (ref 0–0.2)
CONV CO2: 26 MMOL/L (ref 22–32)
CONV COLLECTION SOURCE (UA): NORMAL
CONV CREATININE URINE, RANDOM: 65 MG/DL (ref 10–300)
CONV IMMATURE GRAN: 0.5 % (ref 0–1.8)
CONV MICROALBUM.,U,RANDOM: 18.9 MG/L (ref 0–20)
CONV PROTEIN TO CREATININE RATIO (RANDOM URINE): 0.1 {RATIO} (ref 0–0.1)
CONV TOTAL PROTEIN: 6.5 G/DL (ref 6.3–8.2)
CONV UROBILINOGEN IN URINE BY AUTOMATED TEST STRIP: 0.2 {EHRLICHU}/DL (ref 0.1–1)
CREAT UR-MCNC: 1.79 MG/DL (ref 0.5–0.9)
DEPRECATED RDW RBC AUTO: 47.8 FL (ref 36.4–46.3)
EOSINOPHIL # BLD AUTO: 0.17 10*3/UL (ref 0–0.7)
EOSINOPHIL # BLD AUTO: 3 % (ref 0–7)
ERYTHROCYTE [DISTWIDTH] IN BLOOD BY AUTOMATED COUNT: 13.1 % (ref 11.7–14.4)
EST. AVERAGE GLUCOSE BLD GHB EST-MCNC: 108 MG/DL
GFR SERPLBLD BASED ON 1.73 SQ M-ARVRAT: 26 ML/MIN/{1.73_M2}
GLOBULIN UR ELPH-MCNC: 2.4 G/DL (ref 2–3.5)
GLUCOSE SERPL-MCNC: 103 MG/DL (ref 65–99)
GLUCOSE UR QL: NEGATIVE MG/DL
HBA1C MFR BLD: 5.4 % (ref 3.5–5.7)
HCT VFR BLD AUTO: 35.1 % (ref 37–47)
HDLC SERPL-MCNC: 50 MG/DL (ref 40–60)
HGB BLD-MCNC: 10.7 G/DL (ref 12–16)
HGB UR QL STRIP: NEGATIVE
IRON SATN MFR SERPL: 29 % (ref 20–55)
IRON SERPL-MCNC: 120 UG/DL (ref 60–170)
KETONES UR QL STRIP: NEGATIVE MG/DL
LDLC SERPL CALC-MCNC: 83 MG/DL (ref 70–100)
LEUKOCYTE ESTERASE UR QL STRIP: NEGATIVE
LYMPHOCYTES # BLD AUTO: 0.88 10*3/UL (ref 1–5)
LYMPHOCYTES NFR BLD AUTO: 15.5 % (ref 20–45)
MCH RBC QN AUTO: 30.6 PG (ref 27–31)
MCHC RBC AUTO-ENTMCNC: 30.5 G/DL (ref 33–37)
MCV RBC AUTO: 100.3 FL (ref 81–99)
MICROALBUMIN/CREAT UR: 29.1 MG/G{CRE} (ref 0–35)
MONOCYTES # BLD AUTO: 0.71 10*3/UL (ref 0.2–1.2)
MONOCYTES NFR BLD AUTO: 12.5 % (ref 3–10)
NEUTROPHILS # BLD AUTO: 3.84 10*3/UL (ref 2–8)
NEUTROPHILS NFR BLD AUTO: 67.6 % (ref 30–85)
NITRITE UR QL STRIP: NEGATIVE
NRBC CBCN: 0 % (ref 0–0.7)
OSMOLALITY SERPL CALC.SUM OF ELEC: 298 MOSM/KG (ref 273–304)
PH UR STRIP.AUTO: 6.5 [PH] (ref 5–8)
PHOSPHATE SERPL-MCNC: 3.6 MG/DL (ref 2.4–4.5)
PLATELET # BLD AUTO: 296 10*3/UL (ref 130–400)
PMV BLD AUTO: 10 FL (ref 9.4–12.3)
POTASSIUM SERPL-SCNC: 5.4 MMOL/L (ref 3.5–5.3)
PROT UR QL: NEGATIVE MG/DL
PROT UR-MCNC: 6.4 MG/DL
RBC # BLD AUTO: 3.5 10*6/UL (ref 4.2–5.4)
SODIUM SERPL-SCNC: 142 MMOL/L (ref 135–147)
SP GR UR: 1.01 (ref 1–1.03)
T4 FREE SERPL-MCNC: 1.4 NG/DL (ref 0.9–1.8)
TIBC SERPL-MCNC: 410 UG/DL (ref 245–450)
TRANSFERRIN SERPL-MCNC: 287 MG/DL (ref 250–380)
TRIGL SERPL-MCNC: 114 MG/DL (ref 40–150)
TSH SERPL-ACNC: 1.94 M[IU]/L (ref 0.27–4.2)
VLDLC SERPL-MCNC: 23 MG/DL (ref 5–37)
WBC # BLD AUTO: 5.68 10*3/UL (ref 4.8–10.8)

## 2020-09-02 ENCOUNTER — OFFICE VISIT CONVERTED (OUTPATIENT)
Dept: INTERNAL MEDICINE | Facility: CLINIC | Age: 85
End: 2020-09-02
Attending: INTERNAL MEDICINE

## 2020-09-22 ENCOUNTER — HOSPITAL ENCOUNTER (OUTPATIENT)
Dept: OTHER | Facility: HOSPITAL | Age: 85
Discharge: HOME | End: 2020-09-22
Attending: INTERNAL MEDICINE

## 2020-10-20 ENCOUNTER — HOSPITAL ENCOUNTER (OUTPATIENT)
Dept: ULTRASOUND IMAGING | Facility: HOSPITAL | Age: 85
Discharge: HOME | End: 2020-10-20
Attending: INTERNAL MEDICINE

## 2020-10-20 LAB
CREAT BLD-MCNC: 2.4 MG/DL (ref 0.6–1.4)
GFR SERPLBLD BASED ON 1.73 SQ M-ARVRAT: 18 ML/MIN/{1.73_M2}

## 2020-11-11 ENCOUNTER — OFFICE VISIT CONVERTED (OUTPATIENT)
Dept: CARDIOLOGY | Facility: CLINIC | Age: 85
End: 2020-11-11
Attending: INTERNAL MEDICINE

## 2020-11-11 ENCOUNTER — CONVERSION ENCOUNTER (OUTPATIENT)
Dept: CARDIOLOGY | Facility: CLINIC | Age: 85
End: 2020-11-11

## 2020-11-24 ENCOUNTER — HOSPITAL ENCOUNTER (OUTPATIENT)
Dept: OTHER | Facility: HOSPITAL | Age: 85
Discharge: HOME | End: 2020-11-24
Attending: INTERNAL MEDICINE

## 2020-11-24 LAB
25(OH)D3 SERPL-MCNC: 31.3 NG/ML (ref 30–100)
ALBUMIN SERPL-MCNC: 4 G/DL (ref 3.5–5)
ANION GAP SERPL CALC-SCNC: 17 MMOL/L (ref 8–19)
APPEARANCE UR: CLEAR
BASOPHILS # BLD AUTO: 0.06 10*3/UL (ref 0–0.2)
BASOPHILS NFR BLD AUTO: 0.8 % (ref 0–3)
BILIRUB UR QL: NEGATIVE
BUN SERPL-MCNC: 35 MG/DL (ref 5–25)
BUN/CREAT SERPL: 13 {RATIO} (ref 6–20)
CALCIUM SERPL-MCNC: 9.5 MG/DL (ref 8.7–10.4)
CHLORIDE SERPL-SCNC: 104 MMOL/L (ref 99–111)
COLOR UR: YELLOW
CONV ABS IMM GRAN: 0.02 10*3/UL (ref 0–0.2)
CONV CO2: 24 MMOL/L (ref 22–32)
CONV COLLECTION SOURCE (UA): NORMAL
CONV CREATININE URINE, RANDOM: 42.4 MG/DL (ref 10–300)
CONV IMMATURE GRAN: 0.3 % (ref 0–1.8)
CONV PROTEIN TO CREATININE RATIO (RANDOM URINE): 0.09 {RATIO} (ref 0–0.1)
CONV UROBILINOGEN IN URINE BY AUTOMATED TEST STRIP: 0.2 {EHRLICHU}/DL (ref 0.1–1)
CREAT UR-MCNC: 2.61 MG/DL (ref 0.5–0.9)
DEPRECATED RDW RBC AUTO: 46.5 FL (ref 36.4–46.3)
EOSINOPHIL # BLD AUTO: 0.12 10*3/UL (ref 0–0.7)
EOSINOPHIL # BLD AUTO: 1.5 % (ref 0–7)
ERYTHROCYTE [DISTWIDTH] IN BLOOD BY AUTOMATED COUNT: 12.8 % (ref 11.7–14.4)
GFR SERPLBLD BASED ON 1.73 SQ M-ARVRAT: 16 ML/MIN/{1.73_M2}
GLUCOSE SERPL-MCNC: 102 MG/DL (ref 65–99)
GLUCOSE UR QL: NEGATIVE MG/DL
HCT VFR BLD AUTO: 32 % (ref 37–47)
HGB BLD-MCNC: 9.9 G/DL (ref 12–16)
HGB UR QL STRIP: NEGATIVE
KETONES UR QL STRIP: NEGATIVE MG/DL
LEUKOCYTE ESTERASE UR QL STRIP: NEGATIVE
LYMPHOCYTES # BLD AUTO: 1.15 10*3/UL (ref 1–5)
LYMPHOCYTES NFR BLD AUTO: 14.4 % (ref 20–45)
MCH RBC QN AUTO: 30.2 PG (ref 27–31)
MCHC RBC AUTO-ENTMCNC: 30.9 G/DL (ref 33–37)
MCV RBC AUTO: 97.6 FL (ref 81–99)
MONOCYTES # BLD AUTO: 0.81 10*3/UL (ref 0.2–1.2)
MONOCYTES NFR BLD AUTO: 10.2 % (ref 3–10)
NEUTROPHILS # BLD AUTO: 5.81 10*3/UL (ref 2–8)
NEUTROPHILS NFR BLD AUTO: 72.8 % (ref 30–85)
NITRITE UR QL STRIP: NEGATIVE
NRBC CBCN: 0 % (ref 0–0.7)
PH UR STRIP.AUTO: 6.5 [PH] (ref 5–8)
PHOSPHATE SERPL-MCNC: 2.9 MG/DL (ref 2.4–4.5)
PLATELET # BLD AUTO: 298 10*3/UL (ref 130–400)
PMV BLD AUTO: 9.5 FL (ref 9.4–12.3)
POTASSIUM SERPL-SCNC: 4.9 MMOL/L (ref 3.5–5.3)
PROT UR QL: NEGATIVE MG/DL
PROT UR-MCNC: <4 MG/DL
PTH-INTACT SERPL-MCNC: 44.7 PG/ML (ref 11.1–79.5)
RBC # BLD AUTO: 3.28 10*6/UL (ref 4.2–5.4)
SODIUM SERPL-SCNC: 140 MMOL/L (ref 135–147)
SP GR UR: 1.01 (ref 1–1.03)
WBC # BLD AUTO: 7.97 10*3/UL (ref 4.8–10.8)

## 2020-12-02 ENCOUNTER — HOSPITAL ENCOUNTER (OUTPATIENT)
Dept: CARDIOLOGY | Facility: HOSPITAL | Age: 85
Discharge: HOME | End: 2020-12-02
Attending: SURGERY

## 2020-12-07 ENCOUNTER — OFFICE VISIT CONVERTED (OUTPATIENT)
Dept: INTERNAL MEDICINE | Facility: CLINIC | Age: 85
End: 2020-12-07
Attending: INTERNAL MEDICINE

## 2020-12-17 ENCOUNTER — HOSPITAL ENCOUNTER (OUTPATIENT)
Dept: LAB | Facility: HOSPITAL | Age: 85
Discharge: HOME | End: 2020-12-17
Attending: NURSE PRACTITIONER

## 2020-12-17 LAB
ALBUMIN SERPL-MCNC: 4.1 G/DL (ref 3.5–5)
ANION GAP SERPL CALC-SCNC: 15 MMOL/L (ref 8–19)
BUN SERPL-MCNC: 37 MG/DL (ref 5–25)
BUN/CREAT SERPL: 17 {RATIO} (ref 6–20)
CALCIUM SERPL-MCNC: 9.2 MG/DL (ref 8.7–10.4)
CHLORIDE SERPL-SCNC: 103 MMOL/L (ref 99–111)
CONV CO2: 26 MMOL/L (ref 22–32)
CREAT UR-MCNC: 2.18 MG/DL (ref 0.5–0.9)
GFR SERPLBLD BASED ON 1.73 SQ M-ARVRAT: 20 ML/MIN/{1.73_M2}
GLUCOSE SERPL-MCNC: 99 MG/DL (ref 65–99)
PHOSPHATE SERPL-MCNC: 4 MG/DL (ref 2.4–4.5)
POTASSIUM SERPL-SCNC: 4.1 MMOL/L (ref 3.5–5.3)
SODIUM SERPL-SCNC: 140 MMOL/L (ref 135–147)

## 2020-12-22 ENCOUNTER — OFFICE VISIT CONVERTED (OUTPATIENT)
Dept: ORTHOPEDIC SURGERY | Facility: CLINIC | Age: 85
End: 2020-12-22
Attending: ORTHOPAEDIC SURGERY

## 2021-01-11 ENCOUNTER — HOSPITAL ENCOUNTER (OUTPATIENT)
Dept: OTHER | Facility: HOSPITAL | Age: 86
Setting detail: RECURRING SERIES
Discharge: HOME | End: 2021-01-11
Attending: ORTHOPAEDIC SURGERY

## 2021-01-12 ENCOUNTER — OFFICE VISIT CONVERTED (OUTPATIENT)
Dept: ORTHOPEDIC SURGERY | Facility: CLINIC | Age: 86
End: 2021-01-12
Attending: PHYSICIAN ASSISTANT

## 2021-01-12 ENCOUNTER — CONVERSION ENCOUNTER (OUTPATIENT)
Dept: ORTHOPEDIC SURGERY | Facility: CLINIC | Age: 86
End: 2021-01-12

## 2021-02-03 ENCOUNTER — HOSPITAL ENCOUNTER (OUTPATIENT)
Dept: LAB | Facility: HOSPITAL | Age: 86
Discharge: HOME | End: 2021-02-03
Attending: INTERNAL MEDICINE

## 2021-02-03 LAB
ALBUMIN SERPL-MCNC: 4.4 G/DL (ref 3.5–5)
ANION GAP SERPL CALC-SCNC: 18 MMOL/L (ref 8–19)
APPEARANCE UR: CLEAR
BASOPHILS # BLD AUTO: 0.05 10*3/UL (ref 0–0.2)
BASOPHILS NFR BLD AUTO: 0.8 % (ref 0–3)
BILIRUB UR QL: NEGATIVE
BUN SERPL-MCNC: 49 MG/DL (ref 5–25)
BUN/CREAT SERPL: 19 {RATIO} (ref 6–20)
CALCIUM SERPL-MCNC: 9.6 MG/DL (ref 8.7–10.4)
CHLORIDE SERPL-SCNC: 104 MMOL/L (ref 99–111)
COLOR UR: YELLOW
CONV ABS IMM GRAN: 0.01 10*3/UL (ref 0–0.2)
CONV BACTERIA: ABNORMAL
CONV CO2: 26 MMOL/L (ref 22–32)
CONV COLLECTION SOURCE (UA): ABNORMAL
CONV CREATININE URINE, RANDOM: 68.6 MG/DL (ref 10–300)
CONV HYALINE CASTS IN URINE MICRO: ABNORMAL /[LPF]
CONV IMMATURE GRAN: 0.2 % (ref 0–1.8)
CONV PROTEIN TO CREATININE RATIO (RANDOM URINE): 0.07 {RATIO} (ref 0–0.1)
CONV UROBILINOGEN IN URINE BY AUTOMATED TEST STRIP: 0.2 {EHRLICHU}/DL (ref 0.1–1)
CREAT UR-MCNC: 2.56 MG/DL (ref 0.5–0.9)
DEPRECATED RDW RBC AUTO: 49.2 FL (ref 36.4–46.3)
EOSINOPHIL # BLD AUTO: 0.13 10*3/UL (ref 0–0.7)
EOSINOPHIL # BLD AUTO: 2.1 % (ref 0–7)
ERYTHROCYTE [DISTWIDTH] IN BLOOD BY AUTOMATED COUNT: 13.2 % (ref 11.7–14.4)
GFR SERPLBLD BASED ON 1.73 SQ M-ARVRAT: 17 ML/MIN/{1.73_M2}
GLUCOSE SERPL-MCNC: 117 MG/DL (ref 65–99)
GLUCOSE UR QL: NEGATIVE MG/DL
HCT VFR BLD AUTO: 35 % (ref 37–47)
HGB BLD-MCNC: 10.6 G/DL (ref 12–16)
HGB UR QL STRIP: NEGATIVE
KETONES UR QL STRIP: NEGATIVE MG/DL
LEUKOCYTE ESTERASE UR QL STRIP: ABNORMAL
LYMPHOCYTES # BLD AUTO: 1.15 10*3/UL (ref 1–5)
LYMPHOCYTES NFR BLD AUTO: 18.9 % (ref 20–45)
MCH RBC QN AUTO: 31 PG (ref 27–31)
MCHC RBC AUTO-ENTMCNC: 30.3 G/DL (ref 33–37)
MCV RBC AUTO: 102.3 FL (ref 81–99)
MONOCYTES # BLD AUTO: 0.86 10*3/UL (ref 0.2–1.2)
MONOCYTES NFR BLD AUTO: 14.1 % (ref 3–10)
NEUTROPHILS # BLD AUTO: 3.88 10*3/UL (ref 2–8)
NEUTROPHILS NFR BLD AUTO: 63.9 % (ref 30–85)
NITRITE UR QL STRIP: NEGATIVE
NRBC CBCN: 0 % (ref 0–0.7)
PH UR STRIP.AUTO: 5.5 [PH] (ref 5–8)
PHOSPHATE SERPL-MCNC: 3.9 MG/DL (ref 2.4–4.5)
PLATELET # BLD AUTO: 321 10*3/UL (ref 130–400)
PMV BLD AUTO: 10.1 FL (ref 9.4–12.3)
POTASSIUM SERPL-SCNC: 5.1 MMOL/L (ref 3.5–5.3)
PROT UR QL: NEGATIVE MG/DL
PROT UR-MCNC: 4.9 MG/DL
PTH-INTACT SERPL-MCNC: 49.7 PG/ML (ref 11.1–79.5)
RBC # BLD AUTO: 3.42 10*6/UL (ref 4.2–5.4)
RBC #/AREA URNS HPF: ABNORMAL /[HPF]
SODIUM SERPL-SCNC: 143 MMOL/L (ref 135–147)
SP GR UR: 1.02 (ref 1–1.03)
SQUAMOUS SPT QL MICRO: ABNORMAL /[HPF]
WBC # BLD AUTO: 6.08 10*3/UL (ref 4.8–10.8)
WBC #/AREA URNS HPF: ABNORMAL /[HPF]

## 2021-02-23 ENCOUNTER — OFFICE VISIT CONVERTED (OUTPATIENT)
Dept: ORTHOPEDIC SURGERY | Facility: CLINIC | Age: 86
End: 2021-02-23
Attending: PHYSICIAN ASSISTANT

## 2021-03-08 ENCOUNTER — HOSPITAL ENCOUNTER (OUTPATIENT)
Dept: OTHER | Facility: HOSPITAL | Age: 86
Discharge: HOME | End: 2021-03-08
Attending: INTERNAL MEDICINE

## 2021-03-08 ENCOUNTER — OFFICE VISIT CONVERTED (OUTPATIENT)
Dept: INTERNAL MEDICINE | Facility: CLINIC | Age: 86
End: 2021-03-08
Attending: INTERNAL MEDICINE

## 2021-03-08 ENCOUNTER — CONVERSION ENCOUNTER (OUTPATIENT)
Dept: INTERNAL MEDICINE | Facility: CLINIC | Age: 86
End: 2021-03-08

## 2021-03-08 LAB
25(OH)D3 SERPL-MCNC: 36.1 NG/ML (ref 30–100)
ALBUMIN SERPL-MCNC: 4.3 G/DL (ref 3.5–5)
ALBUMIN/GLOB SERPL: 1.6 {RATIO} (ref 1.4–2.6)
ALP SERPL-CCNC: 80 U/L (ref 43–160)
ALT SERPL-CCNC: 10 U/L (ref 10–40)
AMPHET UR QL CFM: NEGATIVE
ANION GAP SERPL CALC-SCNC: 16 MMOL/L (ref 8–19)
AST SERPL-CCNC: 20 U/L (ref 15–50)
BARBITURATES UR QL: NEGATIVE
BASOPHILS # BLD AUTO: 0.05 10*3/UL (ref 0–0.2)
BASOPHILS NFR BLD AUTO: 0.8 % (ref 0–3)
BENZODIAZ UR QL SCN: NEGATIVE
BILIRUB SERPL-MCNC: 0.27 MG/DL (ref 0.2–1.3)
BUN SERPL-MCNC: 31 MG/DL (ref 5–25)
BUN/CREAT SERPL: 13 {RATIO} (ref 6–20)
CALCIUM SERPL-MCNC: 9.4 MG/DL (ref 8.7–10.4)
CHLORIDE SERPL-SCNC: 103 MMOL/L (ref 99–111)
CHOLEST SERPL-MCNC: 145 MG/DL (ref 107–200)
CHOLEST/HDLC SERPL: 3 {RATIO} (ref 3–6)
CONV ABS IMM GRAN: 0.02 10*3/UL (ref 0–0.2)
CONV AMP/METHAMP UR: NEGATIVE
CONV CO2: 25 MMOL/L (ref 22–32)
CONV COCAINE, UR: NEGATIVE
CONV IMMATURE GRAN: 0.3 % (ref 0–1.8)
CONV TOTAL PROTEIN: 7 G/DL (ref 6.3–8.2)
CREAT UR-MCNC: 2.36 MG/DL (ref 0.5–0.9)
DEPRECATED RDW RBC AUTO: 50.1 FL (ref 36.4–46.3)
EOSINOPHIL # BLD AUTO: 0.12 10*3/UL (ref 0–0.7)
EOSINOPHIL # BLD AUTO: 1.9 % (ref 0–7)
ERYTHROCYTE [DISTWIDTH] IN BLOOD BY AUTOMATED COUNT: 13.5 % (ref 11.7–14.4)
EST. AVERAGE GLUCOSE BLD GHB EST-MCNC: 123 MG/DL
GFR SERPLBLD BASED ON 1.73 SQ M-ARVRAT: 18 ML/MIN/{1.73_M2}
GLOBULIN UR ELPH-MCNC: 2.7 G/DL (ref 2–3.5)
GLUCOSE SERPL-MCNC: 123 MG/DL (ref 65–99)
HBA1C MFR BLD: 5.9 % (ref 3.5–5.7)
HCT VFR BLD AUTO: 34.9 % (ref 37–47)
HDLC SERPL-MCNC: 49 MG/DL (ref 40–60)
HGB BLD-MCNC: 10.7 G/DL (ref 12–16)
IRON SATN MFR SERPL: 33 % (ref 20–55)
IRON SERPL-MCNC: 133 UG/DL (ref 60–170)
LDLC SERPL CALC-MCNC: 62 MG/DL (ref 70–100)
LYMPHOCYTES # BLD AUTO: 1.02 10*3/UL (ref 1–5)
LYMPHOCYTES NFR BLD AUTO: 16.5 % (ref 20–45)
MCH RBC QN AUTO: 30.6 PG (ref 27–31)
MCHC RBC AUTO-ENTMCNC: 30.7 G/DL (ref 33–37)
MCV RBC AUTO: 99.7 FL (ref 81–99)
MDMA UR QL SCN: NEGATIVE
METHADONE UR QL SCN: NEGATIVE
MONOCYTES # BLD AUTO: 0.79 10*3/UL (ref 0.2–1.2)
MONOCYTES NFR BLD AUTO: 12.8 % (ref 3–10)
NEUTROPHILS # BLD AUTO: 4.17 10*3/UL (ref 2–8)
NEUTROPHILS NFR BLD AUTO: 67.7 % (ref 30–85)
NRBC CBCN: 0 % (ref 0–0.7)
OPIATES UR QL SCN: NEGATIVE
OSMOLALITY SERPL CALC.SUM OF ELEC: 298 MOSM/KG (ref 273–304)
OXYCODONE UR QL SCN: NEGATIVE
PCP UR QL: NEGATIVE
PLATELET # BLD AUTO: 309 10*3/UL (ref 130–400)
PMV BLD AUTO: 10.4 FL (ref 9.4–12.3)
POTASSIUM SERPL-SCNC: 4.1 MMOL/L (ref 3.5–5.3)
RBC # BLD AUTO: 3.5 10*6/UL (ref 4.2–5.4)
SODIUM SERPL-SCNC: 140 MMOL/L (ref 135–147)
THC SERPLBLD CFM-MCNC: NEGATIVE NG/ML
TIBC SERPL-MCNC: 405 UG/DL (ref 245–450)
TRANSFERRIN SERPL-MCNC: 283 MG/DL (ref 250–380)
TRIGL SERPL-MCNC: 169 MG/DL (ref 40–150)
VLDLC SERPL-MCNC: 34 MG/DL (ref 5–37)
WBC # BLD AUTO: 6.17 10*3/UL (ref 4.8–10.8)

## 2021-03-12 ENCOUNTER — HOSPITAL ENCOUNTER (OUTPATIENT)
Dept: MRI IMAGING | Facility: HOSPITAL | Age: 86
Discharge: HOME | End: 2021-03-12
Attending: PHYSICIAN ASSISTANT

## 2021-03-26 ENCOUNTER — OFFICE VISIT CONVERTED (OUTPATIENT)
Dept: ORTHOPEDIC SURGERY | Facility: CLINIC | Age: 86
End: 2021-03-26
Attending: ORTHOPAEDIC SURGERY

## 2021-04-05 ENCOUNTER — HOSPITAL ENCOUNTER (OUTPATIENT)
Dept: OTHER | Facility: HOSPITAL | Age: 86
Discharge: HOME | End: 2021-04-05
Attending: ORTHOPAEDIC SURGERY

## 2021-04-14 ENCOUNTER — HOSPITAL ENCOUNTER (OUTPATIENT)
Dept: LAB | Facility: HOSPITAL | Age: 86
Discharge: HOME | End: 2021-04-14
Attending: INTERNAL MEDICINE

## 2021-04-14 LAB
25(OH)D3 SERPL-MCNC: 29.3 NG/ML (ref 30–100)
ALBUMIN SERPL-MCNC: 4.1 G/DL (ref 3.5–5)
ANION GAP SERPL CALC-SCNC: 15 MMOL/L (ref 8–19)
BASOPHILS # BLD AUTO: 0.05 10*3/UL (ref 0–0.2)
BASOPHILS NFR BLD AUTO: 0.7 % (ref 0–3)
BUN SERPL-MCNC: 41 MG/DL (ref 5–25)
BUN/CREAT SERPL: 19 {RATIO} (ref 6–20)
CALCIUM SERPL-MCNC: 9.9 MG/DL (ref 8.7–10.4)
CHLORIDE SERPL-SCNC: 101 MMOL/L (ref 99–111)
CONV ABS IMM GRAN: 0.06 10*3/UL (ref 0–0.2)
CONV CO2: 28 MMOL/L (ref 22–32)
CONV IMMATURE GRAN: 0.8 % (ref 0–1.8)
CREAT UR-MCNC: 2.2 MG/DL (ref 0.5–0.9)
DEPRECATED RDW RBC AUTO: 51.2 FL (ref 36.4–46.3)
EOSINOPHIL # BLD AUTO: 0.17 10*3/UL (ref 0–0.7)
EOSINOPHIL # BLD AUTO: 2.4 % (ref 0–7)
ERYTHROCYTE [DISTWIDTH] IN BLOOD BY AUTOMATED COUNT: 13.5 % (ref 11.7–14.4)
GFR SERPLBLD BASED ON 1.73 SQ M-ARVRAT: 20 ML/MIN/{1.73_M2}
GLUCOSE SERPL-MCNC: 110 MG/DL (ref 65–99)
HCT VFR BLD AUTO: 35 % (ref 37–47)
HGB BLD-MCNC: 10.6 G/DL (ref 12–16)
LYMPHOCYTES # BLD AUTO: 1.28 10*3/UL (ref 1–5)
LYMPHOCYTES NFR BLD AUTO: 17.8 % (ref 20–45)
MCH RBC QN AUTO: 31.2 PG (ref 27–31)
MCHC RBC AUTO-ENTMCNC: 30.3 G/DL (ref 33–37)
MCV RBC AUTO: 102.9 FL (ref 81–99)
MONOCYTES # BLD AUTO: 1.01 10*3/UL (ref 0.2–1.2)
MONOCYTES NFR BLD AUTO: 14 % (ref 3–10)
NEUTROPHILS # BLD AUTO: 4.63 10*3/UL (ref 2–8)
NEUTROPHILS NFR BLD AUTO: 64.3 % (ref 30–85)
NRBC CBCN: 0 % (ref 0–0.7)
PHOSPHATE SERPL-MCNC: 4.1 MG/DL (ref 2.4–4.5)
PLATELET # BLD AUTO: 351 10*3/UL (ref 130–400)
PMV BLD AUTO: 10.1 FL (ref 9.4–12.3)
POTASSIUM SERPL-SCNC: 5 MMOL/L (ref 3.5–5.3)
RBC # BLD AUTO: 3.4 10*6/UL (ref 4.2–5.4)
SODIUM SERPL-SCNC: 139 MMOL/L (ref 135–147)
WBC # BLD AUTO: 7.2 10*3/UL (ref 4.8–10.8)

## 2021-04-16 ENCOUNTER — OFFICE VISIT CONVERTED (OUTPATIENT)
Dept: ORTHOPEDIC SURGERY | Facility: CLINIC | Age: 86
End: 2021-04-16

## 2021-04-22 ENCOUNTER — HOSPITAL ENCOUNTER (OUTPATIENT)
Dept: PET IMAGING | Facility: HOSPITAL | Age: 86
Discharge: HOME | End: 2021-04-22
Attending: INTERNAL MEDICINE

## 2021-04-29 ENCOUNTER — HOSPITAL ENCOUNTER (OUTPATIENT)
Dept: PET IMAGING | Facility: HOSPITAL | Age: 86
Discharge: HOME | End: 2021-04-29
Attending: INTERNAL MEDICINE

## 2021-05-03 ENCOUNTER — HOSPITAL ENCOUNTER (OUTPATIENT)
Dept: URGENT CARE | Facility: CLINIC | Age: 86
Discharge: HOME | End: 2021-05-03
Attending: EMERGENCY MEDICINE

## 2021-05-05 ENCOUNTER — HOSPITAL ENCOUNTER (OUTPATIENT)
Dept: OTHER | Facility: HOSPITAL | Age: 86
Discharge: HOME | End: 2021-05-05
Attending: INTERNAL MEDICINE

## 2021-05-05 LAB
ALBUMIN SERPL-MCNC: 4.1 G/DL (ref 3.5–5)
ALBUMIN/GLOB SERPL: 1.5 {RATIO} (ref 1.4–2.6)
ALP SERPL-CCNC: 81 U/L (ref 43–160)
ALT SERPL-CCNC: 12 U/L (ref 10–40)
ANION GAP SERPL CALC-SCNC: 17 MMOL/L (ref 8–19)
AST SERPL-CCNC: 20 U/L (ref 15–50)
BILIRUB SERPL-MCNC: 0.34 MG/DL (ref 0.2–1.3)
BUN SERPL-MCNC: 45 MG/DL (ref 5–25)
BUN/CREAT SERPL: 18 {RATIO} (ref 6–20)
CALCIUM SERPL-MCNC: 8.8 MG/DL (ref 8.7–10.4)
CHLORIDE SERPL-SCNC: 99 MMOL/L (ref 99–111)
CHOLEST SERPL-MCNC: 145 MG/DL (ref 107–200)
CHOLEST/HDLC SERPL: 2.4 {RATIO} (ref 3–6)
CONV CO2: 25 MMOL/L (ref 22–32)
CONV TOTAL PROTEIN: 6.9 G/DL (ref 6.3–8.2)
CREAT UR-MCNC: 2.45 MG/DL (ref 0.5–0.9)
GFR SERPLBLD BASED ON 1.73 SQ M-ARVRAT: 17 ML/MIN/{1.73_M2}
GLOBULIN UR ELPH-MCNC: 2.8 G/DL (ref 2–3.5)
GLUCOSE SERPL-MCNC: 142 MG/DL (ref 65–99)
HDLC SERPL-MCNC: 61 MG/DL (ref 40–60)
LDLC SERPL CALC-MCNC: 67 MG/DL (ref 70–100)
OSMOLALITY SERPL CALC.SUM OF ELEC: 298 MOSM/KG (ref 273–304)
POTASSIUM SERPL-SCNC: 4.3 MMOL/L (ref 3.5–5.3)
SODIUM SERPL-SCNC: 137 MMOL/L (ref 135–147)
TRIGL SERPL-MCNC: 83 MG/DL (ref 40–150)
VLDLC SERPL-MCNC: 17 MG/DL (ref 5–37)

## 2021-05-07 ENCOUNTER — CONVERSION ENCOUNTER (OUTPATIENT)
Dept: OTHER | Facility: HOSPITAL | Age: 86
End: 2021-05-07

## 2021-05-07 ENCOUNTER — OFFICE VISIT CONVERTED (OUTPATIENT)
Dept: INTERNAL MEDICINE | Facility: CLINIC | Age: 86
End: 2021-05-07
Attending: PHYSICIAN ASSISTANT

## 2021-05-10 NOTE — H&P
History and Physical      Patient Name: Charlette Rai   Patient ID: [id number]   Sex: Female   YOB: 1935    Primary Care Provider: Paloma James MD   Referring Provider: Gabbi Jean-Baptiste PA-C    Visit Date: December 22, 2020    Provider: Edda Jimenez MD   Location: [facility]   Location Address: [address] [place] [address]  [id number]   Location Phone: [phone]          Chief Complaint  · Right femur pain      History Of Present Illness  Charlette Rai is a 85 year old /White female who presents today to [facility].      Patient presents today for an evaluation of right femur pain. Patient went to turn when her daughter called her but her leg didn't turn, it twisted resulting in right leg pain. She states the twist happened around Thanksgivings. She localizes her pain in her knee, up her thigh and in her groin. She states before she twisted her knee and leg she was walking fine and had no pain. Patient hasn't had any treatments for her right lower extremity yet. She was told to wait it out and see if pain would subside by PCP.       Past Medical History  Allergic rhinitis; Anemia; Anemia, Unspecified; Arthritis; Asthma; Bladder Disorder; Cancer; Cataract; CHF (congestive heart failure); Chronic kidney disease, stage 3 (moderate); Chronic Obstructive Pulmonary Disease; Congestive heart failure; COPD (chronic obstructive pulmonary disease); Diabetes; Diabetes mellitus type 2 with complications; Essential hypertension; Gastric Ulcer; GERD (gastroesophageal reflux disease); Hyperlipemia; Hyperlipidemia; Hypertension; Joint Pain, Multiple Sites; Kidney Disease; Leukocytopenia, unspecified; Limb Swelling; Lumbago; Lumbar Spinal Stenosis; Lung cancer; Lung disease; Migraine Headaches; Osteopenia; Reflux; Shortness of Breath; Thyroid disorder; Thyroid nodule; Ulcer; Vascular disease, peripheral; Vitamin D Deficiency         Past Surgical History  *Other; Abdomen;  Appendectomy; Artificial Joints/Limbs; Cardiac Catherization; Cataract surgery; Colonoscopy; EGD; heart surgery; Hysterectomy; Joint Surgery; Knee replacement, left; Knee surgery; lung biopsy; Lung reduction surgery         Medication List  albuterol sulfate 2.5 mg /3 mL (0.083 %) inhalation solution for nebulization; amlodipine 5 mg oral tablet; Aspir-81 81 mg oral tablet,delayed release (DR/EC); atorvastatin 20 mg oral tablet; Dexilant 60 mg oral capsule,biphase delayed releas; fenofibrate 120 mg oral tablet; ferrous sulfate 325 mg (65 mg iron) oral tablet; furosemide 20 mg oral tablet; ipratropium bromide 0.02 % inhalation solution; levocetirizine 5 mg oral tablet; magnesium 200 mg oral tablet; Manual Wheelchair; metoprolol succinate 50 mg oral tablet extended release 24 hr; Mobic 15 mg oral tablet; Nitrostat 0.4 mg sublingual tablet, sublingual; Norco 5-325 mg oral tablet; One Touch Test Strips 0; OneTouch Ultra Blue Test Strip miscellaneous strip; Pepcid 40 mg oral tablet; ranitidine HCl 150 mg oral tablet; Symbicort 160-4.5 mcg/actuation inhalation HFA aerosol inhaler; Tradjenta 5 mg oral tablet; Trulicity 1.5 mg/0.5 mL subcutaneous pen injector; Ventolin HFA 90 mcg/actuation inhalation HFA aerosol inhaler; Vitamin D2 1,250 mcg (50,000 unit) oral capsule; Voltaren 1 % topical gel         Allergy List  NO KNOWN DRUG ALLERGIES       Allergies Reconciled  Family Medical History  Cancer, Unspecified; Diabetes, unspecified type; - No Family History of Colorectal Cancer; Prostate cancer; Blood Diseases; Family history of certain chronic disabling diseases; arthritis         Reproductive History   0 Para 0 0 0 0       Social History  Alcohol Use (Never); Claustophobic (Unknown); Homemaker; Homemaker.; lives with spouse; ; .; Recreational Drug Use (Never); Retired.; Tobacco (Former)         Review of Systems  · Constitutional  o Denies  o : fever, chills, weight  "loss  · Cardiovascular  o Denies  o : chest pain, shortness of breath  · Gastrointestinal  o Denies  o : liver disease, heartburn, nausea, blood in stools  · Genitourinary  o Denies  o : painful urination, blood in urine  · Integument  o Denies  o : rash, itching  · Neurologic  o Denies  o : headache, weakness, loss of consciousness  · Musculoskeletal  o Denies  o : painful, swollen joints  · Psychiatric  o Denies  o : drug/alcohol addiction, anxiety, depression      Vitals  Date Time BP Position Site L\R Cuff Size HR RR TEMP (F) WT  HT  BMI kg/m2 BSA m2 O2 Sat FR L/min FiO2        12/22/2020 10:37 AM      45 - R   166lbs 16oz 5'  5\" 27.79 1.86 90 %            Physical Examination  · Constitutional  o Appearance  o : well developed, well-nourished, no obvious deformities present  · Head and Face  o Head  o :   § Inspection  § : normocephalic  o Face  o :   § Inspection  § : no facial lesions  · Eyes  o Conjunctivae  o : conjunctivae normal  o Sclerae  o : sclerae white  · Ears, Nose, Mouth and Throat  o Ears  o :   § External Ears  § : appearance within normal limits  § Hearing  § : intact  o Nose  o :   § External Nose  § : appearance normal  · Neck  o Inspection/Palpation  o : normal appearance  o Range of Motion  o : full range of motion  · Respiratory  o Respiratory Effort  o : breathing unlabored  o Inspection of Chest  o : normal appearance  o Auscultation of Lungs  o : no audible wheezing or rales  · Cardiovascular  o Heart  o : regular rate  · Gastrointestinal  o Abdominal Examination  o : soft and non-tender  · Skin and Subcutaneous Tissue  o General Inspection  o : intact, no rashes  · Psychiatric  o General  o : Alert and oriented x3  o Judgement and Insight  o : judgment and insight intact  o Mood and Affect  o : mood normal, affect appropriate  · Right Lower Extremity  o Inspection  o : Sensation grossly intact. Neurovascular intact. Skin intact. Calf supple, non-tender. Full weightbearing. Flexion to " 90. Extension to 10. No swelling, skin discoloration or atrophy. Limping gait. Stable to valgus/varus stress. Good strength in quadriceps, hamstrings, dorsiflexors, and plantar flexors. Tender to palpation of the thigh. Groin pain with a leg raise. Tender medial and lateral joint line of the knee. Dorsal Pedal Pulse 2+, posterior tibialis pulse 2+. Good flexion and extension of the ankle.   · Injection Note/Aspiration Note  o Site  o : IM  o Procedure  o : Procedure: After educating the patient, patient gave consent for the procedure. After using alcohol prep, injection was given. The patient tolerated the procedure well.   o Medication  o : 2ml's of 4 mg Dexamethasone   · In Office Procedures  o View  o : AP/LATERAL  o Site  o : right, femur  o Indication  o : Right femur pain  o Study  o : X-rays ordered, taken in the office, and reviewed today.  o Xray  o : No evidence of fracture or dislocation. Moderate degenerative changes present in the knee.           Assessment  · Primary osteoarthritis of right knee     715.16/M17.11  · Right Pain: Hip     719.45/M25.559  · Pain: Knee     719.46/M25.569  · Pain of right thigh     729.5/M79.651      Plan  · Orders  o 2.00 - Dexamethasone Injection 8mg (-3) - 719.45/M25.559 - 12/22/2020   Lot [id number] manufactured by [employer] 12 2021  o IM - Injection Fee [facility] (74314) - 719.45/M25.559 - 12/22/2020   Administered by LIVIA Martinez  o Femur 2 views (Right) [facility] Preferred View (83211) - - 12/22/2020  · Medications  o Medications have been Reconciled  o Transition of Care or Provider Policy  · Instructions  o Dr. Jimenez saw and examined the patient and agrees with plan.   o X-rays reviewed by Dr. Jimenez.  o Reviewed the patient's Past Medical, Social, and Family history as well as the ROS at today's visit, no changes.  o Call or return if worsening symptoms.  o Follow Up in 3 weeks.  o This note was transcribed by Mariana Sousa.   o Discussed diagnosis and treatment options with the  patient. Patient opted for an injection and tolerated it well. Patient wishes to proceed with physical therapy as well.            Electronically Signed by: Mariana Sousa-, Other -Author on December 28, 2020 02:52:53 PM  Electronically Co-signed by: Edda Jimenez MD -Reviewer on December 28, 2020 02:57:56 PM

## 2021-05-12 NOTE — PROGRESS NOTES
"   Progress Note      Patient Name: Charlette Rai   Patient ID: [id number]   Sex: Female   YOB: 1935    Primary Care Provider: Paloma James MD   Referring Provider: Gabbi Jean-Baptiste PA-C    Visit Date: April 2, 2020    Provider: DAT Johnson   Location: [facility]   Location Address: [address] [place] [address], [facility]  [place], [place]  [id number]   Location Phone: [phone]          Chief Complaint  · \"I'm still having trouble breathing\"      History Of Present Illness  Charlette Rai is a 85 year old /White female who presents for evaluation and treatment of:      Still having chest \"tightness\" and shortness of breath, has been taking breathing treatments, some relief.  Unsure if she has had fever.   c/o \"weakness\", headache, dry cough, and some dizziness.  Denies n/v, diarrhea.  using breathing treatments twice daily  using oxygen at night routinely  she has had symptoms for 8-9 days and feels like she is worsening   with similar symptoms       Past Medical History  Disease Name Date Onset Notes   Allergic rhinitis --  --    Anemia --  --    Arthritis --  --    Asthma --  --    Cancer --  --    Cataract --  --    CHF (congestive heart failure) --  --    Chronic kidney disease, stage 3 (moderate) 06/16/2016 --    COPD (chronic obstructive pulmonary disease) --  --    Diabetes --  --    Diabetes mellitus, type 2 --  --    Dizzy 12/19/2017 --    Essential hypertension --  --    Gastric Ulcer --  --    GERD (gastroesophageal reflux disease) --  --    Hyperlipidemia --  --    Hypertension --  --    Joint Pain, Multiple Sites --  --    Leukocytopenia, unspecified --  --    Limb Swelling --  --    Lumbago --  --    Lumbar Spinal Stenosis --  --    Lung cancer --  CHEMO   Lung disease --  --    Migraine Headaches --  --    Osteopenia --  --    Shortness of breath --  --    Thyroid nodule --  --    Vascular disease, peripheral --  --    Vitamin D Deficiency --  --  "         Past Surgical History  Procedure Name Date Notes   *Other --  Fluid drained from heart   Abdomen --  --    Appendectomy --  --    Cardiac Catherization 1996 --    Cataract surgery 2003 --    Colonoscopy 2014 --    EGD 2016 2019 --    heart surgery --  --    Hysterectomy --  --    Knee replacement, left 2016 --    Knee surgery 2016 Left   lung biopsy 2005 LOBECTOMY UPPER LUNG CANCER   Lung reduction surgery 2005 --          Medication List  Name Date Started Instructions   albuterol sulfate 2.5 mg /3 mL (0.083 %) inhalation solution for nebulization 03/02/2020 inhale 3 milliliters (2.5 mg) by nebulization route 4 times per day for 90 days DX COPD J44.9   amlodipine 5 mg oral tablet 01/30/2020 TAKE 1 TABLET ONCE DAILY   Aspir-81 81 mg oral tablet,delayed release (DR/EC)  take 1 tablet (81 mg) by oral route once daily   atorvastatin 20 mg oral tablet 01/30/2020 TAKE 1 TABLET AT BEDTIME   Dexilant 60 mg oral capsule,biphase delayed releas 01/30/2020 TAKE 1 CAPSULE DAILY   fenofibrate 120 mg oral tablet 01/30/2020 TAKE 1 TABLET DAILY   ferrous sulfate 325 mg (65 mg iron) oral tablet 01/28/2020 take 1 tablet (325 mg) by oral route 2 times per day for 30 days   furosemide 20 mg oral tablet 03/02/2020 take 1 tablet (20 mg) by oral route once daily for 90 days   ipratropium bromide 0.02 % inhalation solution 03/02/2020 inhale 1.25 milliliters (250 mcg) via nebulizer by inhalation route 4 times per day for 90 days DX COPD J44.9   levocetirizine 5 mg oral tablet 01/30/2020 TAKE 1 TABLET DAILY   losartan 25 mg oral tablet 03/02/2020 TAKE 1 TABLET ONCE DAILY for 90 days   magnesium 200 mg oral tablet 03/02/2020 take 1 tablet by oral route daily for 90 days   Manual Wheelchair 08/30/2019 UAD DX M19.90; M54.5   metoprolol succinate 50 mg oral tablet extended release 24 hr 03/02/2020 take 1.5 tab (75mg) PO QDAY HS   Nitrostat 0.4 mg sublingual tablet, sublingual  place 1 tablet (0.4 mg) by buccal route at the first sign of  an attack; no more than 3 tabs are recommended within a 15 minute period.   Norco 5-325 mg oral tablet 2020 take 1 tablet by oral route 2 times a day as needed   One Touch Test Strips 0 2020 Dx: E11.9 use as directed to test blood sugar once a day as needed   Pepcid 40 mg oral tablet 2020 take 1 tablet (40 mg) by oral route once daily at bedtime   ranitidine HCl 150 mg oral tablet 2020 TAKE 1 TABLET TWICE A DAY   Symbicort 160-4.5 mcg/actuation inhalation HFA aerosol inhaler 2017 inhale 2 puffs by inhalation route 2 times per day in the morning and evening   Tradjenta 5 mg oral tablet 2020 take 1 tablet (5 mg) by oral route once daily for 90 days   Trulicity 1.5 mg/0.5 mL subcutaneous pen injector 2020 inject 0.5 mL (1.5 mg) by subcutaneous route every 7 days in the abdomen, thigh, or upper arm rotating injection sites   Ventolin HFA 90 mcg/actuation inhalation HFA aerosol inhaler 2017 inhale 1 puff (90 mcg) by inhalation route every 6 hours as needed   Vitamin D2 50,000 unit oral capsule 2020 take 1 capsule (50,000 unit) by oral route once weekly   Voltaren 1 % topical gel 2019 apply 2 gram to the affected area(s) by topical route 4 times per day         Allergy List  Allergen Name Date Reaction Notes   NO KNOWN DRUG ALLERGIES --  --  --        Allergies Reconciled  Family Medical History  Disease Name Relative/Age Notes   - No Family History of Colorectal Cancer  --    Prostate cancer Brother/   2 Brothers   Family history of certain chronic disabling diseases; arthritis Father/  Mother/   Mother; Father         Reproductive History  Menstrual   Method of Birth Control: Other   Pregnancy Summary   Total Pregnancies: 0 Full Term: 0 Premature: 0   Ab Induced: 0 Ab Spontaneous: 0 Ectopics: 0   Multiples: 0 Livin         Social History  Finding Status Start/Stop Quantity Notes   Alcohol Use Never --/-- --  does not drink   Claustophobic Unknown --/-- --   "yes   Homemaker --  --/-- --  --    Homemaker. --  --/-- --  --    lives with spouse --  --/-- --  --     --  --/-- --  --    . --  --/-- --  --    Recreational Drug Use Never --/-- --  no   Tobacco Former --/-- 1 PPD former smoker, 1 packs per day, smoked 21-30 years  former smoker  former smoker  QUIT 2004         Review of Systems  · Constitutional  o Denies  o : fever, fatigue, weight loss, weight gain  · Cardiovascular  o Denies  o : lower extremity edema, claudication, chest pressure, palpitations  · Respiratory  o Admits  o : shortness of breath, cough, dyspnea on exertion  o Denies  o : wheezing, hemoptysis  · Gastrointestinal  o Denies  o : nausea, vomiting, diarrhea, constipation, abdominal pain      Vitals  Date Time BP Position Site L\R Cuff Size HR RR TEMP (F) WT  HT  BMI kg/m2 BSA m2 O2 Sat HC       03/02/2020 11:00 /64 Sitting    106 - R  97.7 169lbs 0oz 5'  5\" 28.12 1.87 97 %    03/27/2020 01:59 /72 Sitting    100 - R  98.5 160lbs 0oz 5'  5\" 26.63 1.82 98 %    04/02/2020 10:18 /84 Sitting    81 - R  96.7 168lbs 0oz 5'  5\" 27.96 1.87 97 %          Physical Examination  · Constitutional  o Appearance  o : no acute distress, well-nourished  · Head and Face  o Head  o :   § Inspection  § : atraumatic, normocephalic  · Eyes  o Eyes  o : extraocular movements intact, no scleral icterus, no conjunctival injection  · Ears, Nose, Mouth and Throat  o Ears  o :   § External Ears  § : normal  § Otoscopic Examination  § : tympanic membrane appearance within normal limits bilaterally  o Nose  o :   § Intranasal Exam  § : nares patent  o Oral Cavity  o :   § Oral Mucosa  § : moist mucous membranes  · Respiratory  o Respiratory Effort  o : breathing comfortably, symmetric chest rise  o Auscultation of Lungs  o : scattered rales throughout left lung, right lung clear except slight wheeze of right base  · Cardiovascular  o Heart  o :   § Auscultation of Heart  § : regular rate and " rhythm, no murmurs, rubs, or gallops  o Peripheral Vascular System  o :   § Extremities  § : no edema  · Lymphatic  o Neck  o : no lymphadenopathy present  · Neurologic  o Mental Status Examination  o :   § Orientation  § : grossly oriented to person, place and time  o Gait and Station  o :   § Gait Screening  § : normal gait  · Psychiatric  o General  o : normal mood and affect          Results  · In-Office Procedures  o Lab procedure  § IOP - Influenza A/B Test (85561)   § Influenza A: Negative   § Influenza B: Negative   § Internal Control Verified?: Yes   § IOP - Rapid Strep (91946)   § Beta Strep Gp A Culture: Negative   § Internal Control Verified?: Yes       Assessment  · Cough     786.2/R05  concern for initial viral etiology complicated by lung cancer hx. given duration and rales of left, will cover with Zithromax. discussed covid-19 risk and inability to test. handout given for further monitoring of symptoms. discussed using albuterol neb q4-6hrs prn. reviewed other supportive care. She and her  will continue to quarantine at home and call for worsening symptoms.  · Shortness of breath     786.05/R06.02    Problems Reconciled  Plan  · Orders  o ACO-39: Current medications updated and reviewed () - - 04/02/2020  o Chest xray 2 views [facility] Preferred View (61004) - 786.05/R06.02, 786.2/R05 - 04/02/2020   DONE IN OFFICE  · Medications  o Zithromax Z-Jonathan 250 mg oral tablet   SIG: take 2 tablets (500 mg) by oral route once daily for 1 day then 1 tablet (250 mg) by oral route once daily for 4 days   DISP: (6) tablets with 0 refills  Prescribed on 04/02/2020     o Medications have been Reconciled  o Transition of Care or Provider Policy  · Instructions  o Patient was educated/instructed on their diagnosis, treatment and medications prior to discharge from the clinic today.  o Call the office with any concerns or questions.  o Chronic conditions reviewed and taken into consideration for today's treatment  plan.  · Disposition  o Call or Return if symptoms worsen or persist.  o Prescriptions sent electronically  o Xray performed in clinic            Electronically Signed by: DAT Johnson -Author on April 2, 2020 11:50:58 AM

## 2021-05-12 NOTE — PROGRESS NOTES
Progress Note      Patient Name: Charlette Rai   Patient ID: [id number]   Sex: Female   YOB: 1935    Primary Care Provider: Paloma James MD   Referring Provider: Gabbi Jean-Baptiste PA-C    Visit Date: March 27, 2020    Provider: Paloma James MD   Location: [facility]   Location Address: [address] [place] [address], [facility]  [place], [place]  [id number]   Location Phone: [phone]          Chief Complaint  · flu symptoms      History Of Present Illness  Charlette Rai is a 85 year old /White female who presents for evaluation and treatment of:      cough, congestion, dyspnea, chest tightness, ear pain. denies fever.  one day of diarrhea, no nausea, vomiting  started Wednesday, worse starting yesterday    Taking inhalers and nebs  Took cough medicine last couple of nights, helped something  Taking Tylenol       Past Medical History  Disease Name Date Onset Notes   Allergic rhinitis --  --    Anemia --  --    Arthritis --  --    Asthma --  --    Cancer --  --    Cataract --  --    CHF (congestive heart failure) --  --    Chronic kidney disease, stage 3 (moderate) 06/16/2016 --    COPD (chronic obstructive pulmonary disease) --  --    Diabetes --  --    Diabetes mellitus, type 2 --  --    Dizzy 12/19/2017 --    Essential hypertension --  --    Gastric Ulcer --  --    GERD (gastroesophageal reflux disease) --  --    Hyperlipidemia --  --    Hypertension --  --    Joint Pain, Multiple Sites --  --    Leukocytopenia, unspecified --  --    Limb Swelling --  --    Lumbago --  --    Lumbar Spinal Stenosis --  --    Lung cancer --  CHEMO   Lung disease --  --    Migraine Headaches --  --    Osteopenia --  --    Shortness of breath --  --    Thyroid nodule --  --    Vascular disease, peripheral --  --    Vitamin D Deficiency --  --          Past Surgical History  Procedure Name Date Notes   *Other --  Fluid drained from heart   Abdomen --  --    Appendectomy --  --    Cardiac  Catherization 1996 --    Cataract surgery 2003 --    Colonoscopy 2014 --    EGD 2016 2019 --    heart surgery --  --    Hysterectomy --  --    Knee replacement, left 2016 --    Knee surgery 2016 Left   lung biopsy 2005 LOBECTOMY UPPER LUNG CANCER   Lung reduction surgery 2005 --          Medication List  Name Date Started Instructions   albuterol sulfate 2.5 mg /3 mL (0.083 %) inhalation solution for nebulization 03/02/2020 inhale 3 milliliters (2.5 mg) by nebulization route 4 times per day for 90 days DX COPD J44.9   amlodipine 5 mg oral tablet 01/30/2020 TAKE 1 TABLET ONCE DAILY   Aspir-81 81 mg oral tablet,delayed release (DR/EC)  take 1 tablet (81 mg) by oral route once daily   atorvastatin 20 mg oral tablet 01/30/2020 TAKE 1 TABLET AT BEDTIME   Dexilant 60 mg oral capsule,biphase delayed releas 01/30/2020 TAKE 1 CAPSULE DAILY   fenofibrate 120 mg oral tablet 01/30/2020 TAKE 1 TABLET DAILY   ferrous sulfate 325 mg (65 mg iron) oral tablet 01/28/2020 take 1 tablet (325 mg) by oral route 2 times per day for 30 days   furosemide 20 mg oral tablet 03/02/2020 take 1 tablet (20 mg) by oral route once daily for 90 days   ipratropium bromide 0.02 % inhalation solution 03/02/2020 inhale 1.25 milliliters (250 mcg) via nebulizer by inhalation route 4 times per day for 90 days DX COPD J44.9   levocetirizine 5 mg oral tablet 01/30/2020 TAKE 1 TABLET DAILY   losartan 25 mg oral tablet 03/02/2020 TAKE 1 TABLET ONCE DAILY for 90 days   magnesium 200 mg oral tablet 03/02/2020 take 1 tablet by oral route daily for 90 days   Manual Wheelchair 08/30/2019 UAD DX M19.90; M54.5   metoprolol succinate 50 mg oral tablet extended release 24 hr 03/02/2020 take 1.5 tab (75mg) PO QDAY HS   Nitrostat 0.4 mg sublingual tablet, sublingual  place 1 tablet (0.4 mg) by buccal route at the first sign of an attack; no more than 3 tabs are recommended within a 15 minute period.   Norco 5-325 mg oral tablet 03/02/2020 take 1 tablet by oral route 2  times a day as needed   One Touch Test Strips 0 2020 Dx: E11.9 use as directed to test blood sugar once a day as needed   Pepcid 40 mg oral tablet 2020 take 1 tablet (40 mg) by oral route once daily at bedtime   ranitidine HCl 150 mg oral tablet 2020 TAKE 1 TABLET TWICE A DAY   Symbicort 160-4.5 mcg/actuation inhalation HFA aerosol inhaler 2017 inhale 2 puffs by inhalation route 2 times per day in the morning and evening   Tradjenta 5 mg oral tablet 2020 take 1 tablet (5 mg) by oral route once daily for 90 days   Trulicity 1.5 mg/0.5 mL subcutaneous pen injector 2020 inject 0.5 mL (1.5 mg) by subcutaneous route every 7 days in the abdomen, thigh, or upper arm rotating injection sites   Ventolin HFA 90 mcg/actuation inhalation HFA aerosol inhaler 2017 inhale 1 puff (90 mcg) by inhalation route every 6 hours as needed   Vitamin D2 50,000 unit oral capsule 2020 take 1 capsule (50,000 unit) by oral route once weekly   Voltaren 1 % topical gel 2019 apply 2 gram to the affected area(s) by topical route 4 times per day         Allergy List  Allergen Name Date Reaction Notes   NO KNOWN DRUG ALLERGIES --  --  --          Family Medical History  Disease Name Relative/Age Notes   - No Family History of Colorectal Cancer  --    Prostate cancer Brother/   2 Brothers   Family history of certain chronic disabling diseases; arthritis Father/  Mother/   Mother; Father         Reproductive History  Menstrual   Method of Birth Control: Other   Pregnancy Summary   Total Pregnancies: 0 Full Term: 0 Premature: 0   Ab Induced: 0 Ab Spontaneous: 0 Ectopics: 0   Multiples: 0 Livin         Social History  Finding Status Start/Stop Quantity Notes   Alcohol Use Never --/-- --  does not drink   Claustophobic Unknown --/-- --  yes   Homemaker --  --/-- --  --    Homemaker. --  --/-- --  --    lives with spouse --  --/-- --  --     --  --/-- --  --    . --  --/-- --  --   "  Recreational Drug Use Never --/-- --  no   Tobacco Former --/-- 1 PPD former smoker, 1 packs per day, smoked 21-30 years  former smoker  former smoker  QUIT 2004         Review of Systems  · Constitutional  o Denies  o : fever, fatigue, weight loss, weight gain  · HENT  o Admits  o : nasal congestion, ear pain  · Cardiovascular  o Denies  o : lower extremity edema, claudication, chest pressure, palpitations  · Respiratory  o Admits  o : shortness of breath, frequent cough, dyspnea on exertion  o Denies  o : wheezing, hemoptysis  · Gastrointestinal  o Admits  o : diarrhea  o Denies  o : nausea, vomiting, constipation, abdominal pain      Vitals  Date Time BP Position Site L\R Cuff Size HR RR TEMP (F) WT  HT  BMI kg/m2 BSA m2 O2 Sat HC       12/13/2019 01:22 /60 Sitting    104 - R 16 98.3 172lbs 0oz 5'  5\" 28.62 1.89 96 %    03/02/2020 11:00 /64 Sitting    106 - R  97.7 169lbs 0oz 5'  5\" 28.12 1.87 97 %    03/27/2020 01:59 /72 Sitting    100 - R  98.5 160lbs 0oz 5'  5\" 26.63 1.82 98 %          Physical Examination  · Constitutional  o Appearance  o : no acute distress, well-nourished  · Head and Face  o Head  o :   § Inspection  § : atraumatic, normocephalic  · Eyes  o Eyes  o : extraocular movements intact, no scleral icterus, no conjunctival injection  · Ears, Nose, Mouth and Throat  o Ears  o :   § External Ears  § : normal  § Otoscopic Examination  § : tympanic membrane appearance within normal limits bilaterally  o Nose  o :   § Intranasal Exam  § : nares patent  o Oral Cavity  o :   § Oral Mucosa  § : moist mucous membranes  o Throat  o :   § Oropharynx  § : no inflammation or lesions present, tonsils within normal limits  · Respiratory  o Respiratory Effort  o : breathing comfortably, symmetric chest rise  o Auscultation of Lungs  o : clear to asculatation bilaterally, no wheezes, rales, or rhonchii  · Cardiovascular  o Heart  o :   § Auscultation of Heart  § : regular rate and rhythm, no " murmurs, rubs, or gallops  o Peripheral Vascular System  o :   § Extremities  § : no edema  · Neurologic  o Mental Status Examination  o :   § Orientation  § : grossly oriented to person, place and time  o Gait and Station  o :   § Gait Screening  § : normal gait  · Psychiatric  o General  o : normal mood and affect          Results  · In-Office Procedures  o Lab procedure  § IOP - Influenza A/B Test (56188)   § Influenza A: Negative   § Influenza B: Negative   § Internal Control Verified?: Yes       Assessment  · Viral syndrome     079.99/B34.9  flu negative  likely viral illness  O2 sats normal in clinic, lung exam normal  Patient at high risk of complications from viral infection  Continue supportive care with mucinex, cough medication, Tylenol/Motrin, increase PO fluid intake, rest  Discussed home quarantine and self monitoring  Discussed reasons to call clinic or seek care urgently      Plan  · Orders  o ACO-39: Current medications updated and reviewed () - - 03/27/2020  · Medications  o Medications have been Reconciled  o Transition of Care or Provider Policy  · Instructions  o Rest. Increase Fluids.  o Patient was educated/instructed on their diagnosis, treatment and medications prior to discharge from the clinic today.  o Patient instructed to seek medical attention urgently for new or worsening symptoms.  o Call the office with any concerns or questions.  · Disposition  o Call or Return if symptoms worsen or persist.  o follow up as needed            Electronically Signed by: Paloma James MD -Author on March 30, 2020 10:05:44 AM

## 2021-05-12 NOTE — PROGRESS NOTES
Quick Note      Patient Name: Charlette Rai   Patient ID: [id number]   Sex: Female   YOB: 1935    Primary Care Provider: Paloma James MD   Referring Provider: Gabbi Jean-Baptiste PA-C    Visit Date: April 23, 2020    Provider: León Sanchez MD   Location: [facility]   Location Address: [address] [place] [address], [place], [place]  [id number]   Location Phone: [phone]          History Of Present Illness  TELEHEALTH TELEPHONE VISIT  Charlette Rai is an 85 year old /White female who is presenting for evaluation via telehealth telephone visit. Verbal consent obtained before beginning visit. Telehealth due to COVID-19. She has a history of carotid artery stenosis, hypertension, dyslipidemia and chronic renal insufficiency, who has been doing well. She has had stable shortness of breath, edema, chronic cough and wheezing.   Provider spent 5 minutes with the patient during telehealth visit.   The following staff were present during this visit: Provider only.   Past Medical History/Overview of Patient Symptoms     PAST MEDICAL HISTORY:  Positive for carotid artery stenosis, hypertension, dyslipidemia and chronic renal failure.     CURRENT MEDICATIONS: Symbicort; Ventolin; Albuterol; sublingual NTG; Losartan 25 mg daily; Dexilant 60 mg daily; Ranitidine 150 mg b.i.d.; Lasix 20 mg daily; Atorvastatin 20 mg daily; Tradjenta 5 mg daily; Metoprolol 75 mg daily; Levocetirizine 5 mg daily; Fenofibrate 120 mg daily; Trulicity; ASA 81 mg daily; fiber; magnesium; Cilostazol.   The dosage and frequency of the medications were reviewed with the patient.    FAMILY HISTORY:  Unknown.    PSYCHO/SOCIAL HISTORY:  Unknown.    REVIEW OF SYSTEMS: Positive for shortness of breath while walking or lying flat, swelling:  feet, ankles or hands, chronic or frequent coughs, asthma or wheezing.  Negative for chest pain or angina pectoris, palpitations (fast, fluttering or skipping beats).       Creatinine is 1.95.  LDL was 67.       Vitals     Per patient, at-home blood pressure was 171/70.           Assessment     ASSESSMENT AND PLAN:    1.  Carotid artery stenosis:  She is on chronic ASA therapy as well as statin.  LDL at goal.  2.  Hyperlipidemia:  On statin and tolerating well.  No myalgia symptoms.    MD Amanda Betancourt/una         This note was transcribed by Kayli Albarran.  una/amanda   The above service was transcribed by Kayli Albarran, and I attest to the accuracy of the note.  .               Electronically Signed by: Kayli Albarran-, -Author on April 28, 2020 09:08:24 AM  Electronically Co-signed by: León Sanchez MD -Reviewer on April 28, 2020 10:23:13 AM

## 2021-05-13 ENCOUNTER — CONVERSION ENCOUNTER (OUTPATIENT)
Dept: CARDIOLOGY | Facility: CLINIC | Age: 86
End: 2021-05-13

## 2021-05-13 ENCOUNTER — OFFICE VISIT CONVERTED (OUTPATIENT)
Dept: CARDIOLOGY | Facility: CLINIC | Age: 86
End: 2021-05-13
Attending: INTERNAL MEDICINE

## 2021-05-13 NOTE — PROGRESS NOTES
"   Progress Note      Patient Name: Charlette Rai   Patient ID: [id number]   Sex: Female   YOB: 1935    Primary Care Provider: Paloma James MD   Referring Provider: Gabbi Jean-Baptiste PA-C    Visit Date: November 11, 2020    Provider: León Sanchez MD   Location: [facility]   Location Address: [address] [place] [address], [place], [place]  [id number]   Location Phone: [phone]          Chief Complaint  · Peripheral vascular disease   · Hypertension       History Of Present Illness  REFERRING CARE PROVIDER: Paloma James MD   Charlette Rai is an 85 year old /White female with known carotid artery stenosis, hypertension, dyslipidemia and chronic renal failure, who has had stable shortness of breath and mild chronic lower-extremity edema.   PAST MEDICAL HISTORY: Carotid artery stenosis; hypertension; dyslipidemia; chronic renal failure.   PSYCHOSOCIAL HISTORY: She never used alcohol. She previously used tobacco but quit.   CURRENT MEDICATIONS: include fiber; Symbicort; Ventolin; NTG p.r.n.; Diclofenac gel; Famotidine 40 mg daily; Dexilant 60 mg daily; Cilostazol 100 mg b.i.d.; Metoprolol Succinate 50 mg b.i.d.; Furosemide 20 mg daily; ferrous sulfate 325 mg b.i.d.; Losartan 25 mg daily; Fenofibrate 120 mg daily; Amlodipine 2.5 mg daily; Levocetirizine 5 mg daily; Vitamin D2; Atorvastatin 20 mg daily; Trulicty. The dosage and frequency of the medications were reviewed with the patient.       Review of Systems  · Cardiovascular  o Admits  o : swelling (feet, ankles, hands), shortness of breath while walking or lying flat  o Denies  o : palpitations (fast, fluttering, or skipping beats), chest pain or angina pectoris   · Respiratory  o Denies  o : chronic or frequent cough, asthma or wheezing      Vitals  Date Time BP Position Site L\R Cuff Size HR RR TEMP (F) WT  HT  BMI kg/m2 BSA m2 O2 Sat FR L/min FiO2        11/11/2020 12:13 /66 Sitting    95 - R   171lbs 0oz 5'  5\" " 28.46 1.89       11/11/2020 12:13 /68 Sitting                       Physical Examination  · Constitutional  o Appearance  o : Awake, alert, in no acute distress.   · Eyes  o Conjunctivae  o : Normal.  · Ears, Nose, Mouth and Throat  o Oral Cavity  o :   § Oral Mucosa  § : Normal.  · Neck  o Inspection/Palpation  o : No JVD. Good carotid upstroke. No thyromegaly.  · Respiratory  o Respiratory  o : Good respiratory effort. Clear to percussion and auscultation.  · Cardiovascular  o Heart  o :   § Auscultation of Heart  § : S1, S2 normal. Regular rate and rhythm without murmurs, gallops, or rubs.  o Peripheral Vascular System  o :   § Extremities  § : Good femoral and pedal pulses. +1 bilateral lower-extremity edema.  · Gastrointestinal  o Abdominal Examination  o : Soft. No tenderness or masses felt. No hepatosplenomegaly. Abdominal aorta is not palpable.     EKG was performed in the office today.  Indication:       peripheral vascular disease, hypertension.  Results:           normal sinus rhythm with borderline left ventricular hypertrophy.  Comparison:    No change from prior.    LDL 83, .               Assessment     ASSESSMENT AND PLAN:    1.  Carotid artery stenosis:  Patient is on chronic ASA 81 mg once a day.  2.  Hyperlipidemia:  LDL above goal mildly.  Will change Lipitor to 40 mg once a day and decrease her        Fenofibrate to 54 mg to minimize possible interaction.  Repeat lipids and LFTs on next visit.  3.  Hypertension:  Mildly elevated.  Increase Norvasc to 5 mg once a day.    MD Graeme Betancourt/una         This note was transcribed by Kayli Albarran.  una/graeme   The above service was transcribed by Kayli Albarran, and I attest to the accuracy of the note.  GRAEME.               Electronically Signed by: Kayli Albarran-, -Author on November 17, 2020 05:01:41 AM  Electronically Co-signed by: León Sanchez MD -Reviewer on November 18, 2020 09:48:46 AM

## 2021-05-13 NOTE — PROGRESS NOTES
Progress Note      Patient Name: Charlette Rai   Patient ID: [id number]   Sex: Female   YOB: 1935    Primary Care Provider: Paloma James MD   Referring Provider: Paloma James MD    Visit Date: December 7, 2020    Provider: Paloma James MD   Location: [facility]   Location Address: [address] [place] [address]  [id number]   Location Phone: [phone]          Chief Complaint  · HTN  · Pain Medication management      History Of Present Illness  Charlette Rai is a 85 year old /White female who presents for evaluation and treatment of:      f/u HTN- Controlled today. Nephrology took her off Losartan     Right hand pain x1 month.     Right leg pain- twisted leg a week ago.     Would like Norco refilled today.       Past Medical History  Disease Name Date Onset Notes   Allergic rhinitis --  --    Anemia --  --    Arthritis --  --    Asthma --  --    Cancer --  --    Cataract --  --    CHF (congestive heart failure) --  --    Chronic kidney disease, stage 3 (moderate) 06/16/2016 --    COPD (chronic obstructive pulmonary disease) --  --    Diabetes mellitus type 2 with complications --  --    Essential hypertension --  --    Gastric Ulcer --  --    GERD (gastroesophageal reflux disease) --  --    Hyperlipidemia --  --    Hypertension --  --    Joint Pain, Multiple Sites --  --    Leukocytopenia, unspecified --  --    Lumbago --  --    Lumbar Spinal Stenosis --  --    Lung cancer --  CHEMO   Lung disease --  --    Migraine Headaches --  --    Osteopenia --  --    Thyroid nodule --  --    Vascular disease, peripheral --  --    Vitamin D Deficiency --  --          Past Surgical History  Procedure Name Date Notes   *Other --  Fluid drained from heart   Abdomen --  --    Appendectomy --  --    Cardiac Catherization 1996 --    Cataract surgery 2003 --    Colonoscopy 2014 --    EGD 2016 2019 --    heart surgery --  --    Hysterectomy --  --    Knee  replacement, left 2016 --    Knee surgery 2016 Left   lung biopsy 2005 LOBECTOMY UPPER LUNG CANCER   Lung reduction surgery 2005 --          Medication List  Name Date Started Instructions   albuterol sulfate 2.5 mg /3 mL (0.083 %) inhalation solution for nebulization 12/07/2020 inhale 3 milliliters (2.5 mg) by nebulization route 4 times per day for 90 days DX COPD J44.9   amlodipine 5 mg oral tablet 12/07/2020 TAKE 1 TABLET ONCE DAILY for 90 days   Aspir-81 81 mg oral tablet,delayed release (DR/EC)  take 1 tablet (81 mg) by oral route once daily   atorvastatin 20 mg oral tablet 10/12/2020 TAKE 1 TABLET AT BEDTIME   Dexilant 60 mg oral capsule,biphase delayed releas 12/07/2020 TAKE 1 CAPSULE DAILY for 90 days   fenofibrate 120 mg oral tablet 12/07/2020 TAKE 1 TABLET DAILY for 90 days   ferrous sulfate 325 mg (65 mg iron) oral tablet 12/07/2020 take 1 tablet (325 mg) by oral route 2 times per day for 90 days   furosemide 20 mg oral tablet 12/07/2020 take 1 tablet (20 mg) by oral route once daily for 90 days for 90 days   ipratropium bromide 0.02 % inhalation solution 12/07/2020 inhale 1.25 milliliters (250 mcg) via nebulizer by inhalation route 4 times per day for 90 days DX COPD J44.9   levocetirizine 5 mg oral tablet 12/07/2020 TAKE 1 TABLET DAILY   magnesium 200 mg oral tablet 12/07/2020 take 1 tablet by oral route daily for 90 days   Manual Wheelchair 08/30/2019 UAD DX M19.90; M54.5   metoprolol succinate 50 mg oral tablet extended release 24 hr 12/07/2020 TAKE 1 AND 1/2 TABLETS(75MG) AT BEDTIME   Nitrostat 0.4 mg sublingual tablet, sublingual  place 1 tablet (0.4 mg) by buccal route at the first sign of an attack; no more than 3 tabs are recommended within a 15 minute period.   Norco 5-325 mg oral tablet 12/07/2020 take 1 tablet by oral route 2 times a day as needed   One Touch Test Strips 0 09/02/2020 Dx: E11.9 use as directed to test blood sugar once a day as needed   OneTouch Ultra Blue Test Strip  miscellaneous strip 2020 AS DIRECTED TO TEST BLOOD SUGAR ONCE A DAY AS NEEDED   Pepcid 40 mg oral tablet 2020 take 1 tablet (40 mg) by oral route once daily at bedtime for 90 days   ranitidine HCl 150 mg oral tablet 2020 TAKE 1 TABLET TWICE A DAY   Symbicort 160-4.5 mcg/actuation inhalation HFA aerosol inhaler 2020 inhale 2 puffs by inhalation route 2 times per day in the morning and evening   Tradjenta 5 mg oral tablet 2020 TAKE 1 TABLET ONCE DAILY   Trulicity 1.5 mg/0.5 mL subcutaneous pen injector 2020 INJECT 0.5ML (=1.5MG) SUBCUTANEOUSLY EVERY 7 DAYS IN THE ABDOMEN, THIGH, OR UPPER ARM ROTATING INJECTION SITES   Ventolin HFA 90 mcg/actuation inhalation HFA aerosol inhaler 2020 inhale 1 puff (90 mcg) by inhalation route every 6 hours as needed   Vitamin D2 1,250 mcg (50,000 unit) oral capsule 2020 take 1 capsule (50,000 unit) by oral route once weekly   Voltaren 1 % topical gel 2020 apply 2 gram to the affected area(s) by topical route 4 times per day         Allergy List  Allergen Name Date Reaction Notes   NO KNOWN DRUG ALLERGIES --  --  --        Allergies Reconciled  Family Medical History  Disease Name Relative/Age Notes   - No Family History of Colorectal Cancer  --    Prostate cancer Brother/   2 Brothers   Family history of certain chronic disabling diseases; arthritis Father/  Mother/   Mother; Father         Reproductive History  Menstrual   Method of Birth Control: Other   Pregnancy Summary   Total Pregnancies: 0 Full Term: 0 Premature: 0   Ab Induced: 0 Ab Spontaneous: 0 Ectopics: 0   Multiples: 0 Livin         Social History  Finding Status Start/Stop Quantity Notes   Alcohol Use Never --/-- --  does not drink   Claustophobic Unknown --/-- --  yes   Homemaker --  --/-- --  --    Homemaker. --  --/-- --  --    lives with spouse --  --/-- --  --     --  --/-- --  --    . --  --/-- --  --    Recreational Drug Use Never --/-- --  no  "  Tobacco Former --/-- 1 PPD former smoker, 1 packs per day, smoked 21-30 years  former smoker  former smoker  QUIT 2004         Review of Systems  · Constitutional  o Denies  o : fever, fatigue, weight loss, weight gain  · Cardiovascular  o Denies  o : lower extremity edema, claudication, chest pressure, palpitations  · Respiratory  o Denies  o : shortness of breath, wheezing, frequent cough, hemoptysis, dyspnea on exertion  · Gastrointestinal  o Denies  o : nausea, vomiting, diarrhea, constipation, abdominal pain  · Musculoskeletal  o Admits  o : joint pain, muscle pain, knee pain      Vitals  Date Time BP Position Site L\R Cuff Size HR RR TEMP (F) WT  HT  BMI kg/m2 BSA m2 O2 Sat FR L/min FiO2 HC       04/02/2020 10:18 /84 Sitting    81 - R  96.7 168lbs 0oz 5'  5\" 27.96 1.87 97 %      09/02/2020 01:38 /86 Sitting    110 - R  98.3 170lbs 0oz 5'  5\" 28.29 1.88 92 %      12/07/2020 01:53 /60 Sitting    88 - R 18 98.3 165lbs 16oz 5'  5\" 27.62 1.86 96 %            Physical Examination  · Constitutional  o Appearance  o : no acute distress, well-nourished  · Head and Face  o Head  o :   § Inspection  § : atraumatic, normocephalic  · Eyes  o Eyes  o : extraocular movements intact, no scleral icterus, no conjunctival injection  · Ears, Nose, Mouth and Throat  o Ears  o :   § External Ears  § : normal  o Nose  o :   § Intranasal Exam  § : nares patent  o Oral Cavity  o :   § Oral Mucosa  § : moist mucous membranes  · Respiratory  o Respiratory Effort  o : breathing comfortably, symmetric chest rise  o Auscultation of Lungs  o : clear to asculatation bilaterally, no wheezes, rales, or rhonchii  · Cardiovascular  o Heart  o :   § Auscultation of Heart  § : regular rate and rhythm, no murmurs, rubs, or gallops  o Peripheral Vascular System  o :   § Extremities  § : no edema  · Neurologic  o Mental Status Examination  o :   § Orientation  § : grossly oriented to person, place and time  o Gait and " Station  o :   § Gait Screening  § : normal gait  · Psychiatric  o General  o : normal mood and affect          Results  · In-Office Procedures  o Lab procedure  § IOP - Urine Drug Screen In-House [facility] ([address])   § Amphetamines Ur Ql: Negative   § Barbiturates Ur Ql: Negative   § Buprenorphine+Nor Ur Ql Scn: Negative   § Benzodiaz Ur Ql: Negative   § Cocaine Ur Ql: Negative   § Methadone Ur Ql: Negative   § Methamphet Ur Ql: Negative   § MDMA Ur Ql Scn: Negative   § Opiates Ur Ql: Negative   § Oxycodone Ur Ql: Negative   § PCP Ur Ql: Negative   § THC Ur Ql: Negative   § Temp in Range?: Within/Acceptable   § Control Seen?: Yes       Assessment  · Chronic kidney disease, stage 3 (moderate)     585.3/N18.3  · Hypertension     401.9/I10  · Long term (current) use of opiate analgesic     V58.69/Z79.891      Plan  · Orders  o ACO-39: Current medications updated and reviewed (1159F, ) - - 12/07/2020  o ACO-15: Pneumococcal Vaccine Administered or Previously Received [facility] (4040F) - - 12/07/2020  o ACO-14: Influenza immunization administered or previously received [facility] () - - 12/07/2020  o ACO-13: Fall Risk Screening with no falls in past year or only one fall without injury in the past year (1101F) - - 12/07/2020  · Medications  o albuterol sulfate 2.5 mg /3 mL (0.083 %) inhalation solution for nebulization   SIG: inhale 3 milliliters (2.5 mg) by nebulization route 4 times per day for 90 days DX COPD J44.9   DISP: (3) Inhaler with 1 refills  Refilled on 12/07/2020     o amlodipine 5 mg oral tablet   SIG: TAKE 1 TABLET ONCE DAILY for 90 days   DISP: (90) Tablet with 1 refills  Refilled on 12/07/2020     o Dexilant 60 mg oral capsule,biphase delayed releas   SIG: TAKE 1 CAPSULE DAILY for 90 days   DISP: (90) Capsule with 1 refills  Refilled on 12/07/2020     o fenofibrate 120 mg oral tablet   SIG: TAKE 1 TABLET DAILY for 90 days   DISP: (90) Tablet with 1 refills  Refilled on 12/07/2020     o ferrous sulfate 325 mg (65  mg iron) oral tablet   SIG: take 1 tablet (325 mg) by oral route 2 times per day for 90 days   DISP: (180) Tablet with 1 refills  Refilled on 12/07/2020     o furosemide 20 mg oral tablet   SIG: take 1 tablet (20 mg) by oral route once daily for 90 days for 90 days   DISP: (90) Tablet with 1 refills  Refilled on 12/07/2020     o ipratropium bromide 0.02 % inhalation solution   SIG: inhale 1.25 milliliters (250 mcg) via nebulizer by inhalation route 4 times per day for 90 days DX COPD J44.9   DISP: (3) Box with 2 refills  Refilled on 12/07/2020     o levocetirizine 5 mg oral tablet   SIG: TAKE 1 TABLET DAILY   DISP: (90) Tablet with 1 refills  Refilled on 12/07/2020     o magnesium 200 mg oral tablet   SIG: take 1 tablet by oral route daily for 90 days   DISP: (90) Tablet with 1 refills  Refilled on 12/07/2020     o metoprolol succinate 50 mg oral tablet extended release 24 hr   SIG: TAKE 1 AND 1/2 TABLETS(75MG) AT BEDTIME   DISP: (135) Tablet with 1 refills  Refilled on 12/07/2020     o Norco 5-325 mg oral tablet   SIG: take 1 tablet by oral route 2 times a day as needed   DISP: (30) Tablet with 0 refills  Refilled on 12/07/2020     o Pepcid 40 mg oral tablet   SIG: take 1 tablet (40 mg) by oral route once daily at bedtime for 90 days   DISP: (90) Tablet with 1 refills  Refilled on 12/07/2020     o Tradjenta 5 mg oral tablet   SIG: TAKE 1 TABLET ONCE DAILY   DISP: (90) Tablet with 1 refills  Refilled on 12/07/2020     o Trulicity 1.5 mg/0.5 mL subcutaneous pen injector   SIG: INJECT 0.5ML (=1.5MG) SUBCUTANEOUSLY EVERY 7 DAYS IN THE ABDOMEN, THIGH, OR UPPER ARM ROTATING INJECTION SITES   DISP: (6) Milliliter with 2 refills  Refilled on 12/07/2020     o Vitamin D2 1,250 mcg (50,000 unit) oral capsule   SIG: take 1 capsule (50,000 unit) by oral route once weekly   DISP: (12) Capsule with 1 refills  Refilled on 12/07/2020     o Voltaren 1 % topical gel   SIG: apply 2 gram to the affected area(s) by topical route 4 times  per day   DISP: (1) Tablet with 2 refills  Refilled on 12/07/2020     o Medications have been Reconciled  o Transition of Care or Provider Policy  · Instructions  o Take all medications as prescribed/directed.  o Patient was educated/instructed on their diagnosis, treatment and medications prior to discharge from the clinic today.  o Call the office with any concerns or questions.  · Disposition  o Follow up in 3 months            Electronically Signed by: Paloma James MD -Author on December 9, 2020 03:32:06 PM

## 2021-05-13 NOTE — PROGRESS NOTES
Progress Note      Patient Name: Charlette Rai   Patient ID: [id number]   Sex: Female   YOB: 1935    Primary Care Provider: Paloma James MD   Referring Provider: Gabbi Jean-Baptiste PA-C    Visit Date: September 2, 2020    Provider: Paloma James MD   Location: [facility]   Location Address: [address] [place] [address], [facility] [address]  [place], [place]  [id number]   Location Phone: [phone]          Chief Complaint  · F/U dizzy  · F/U HTN      History Of Present Illness  Charlette Rai is a 85 year old /White female who presents for evaluation and treatment of:      Dizzy: occurs occassionally  has had a couple of falls    HTN: well controlled today    Needs refill of her pain medication, last fill of Norco was in April.            Past Medical History  Disease Name Date Onset Notes   Allergic rhinitis --  --    Anemia --  --    Arthritis --  --    Asthma --  --    Cancer --  --    Cataract --  --    CHF (congestive heart failure) --  --    Chronic kidney disease, stage 3 (moderate) 06/16/2016 --    COPD (chronic obstructive pulmonary disease) --  --    Diabetes mellitus type 2 with complications --  --    Essential hypertension --  --    Gastric Ulcer --  --    GERD (gastroesophageal reflux disease) --  --    Hyperlipidemia --  --    Hypertension --  --    Joint Pain, Multiple Sites --  --    Leukocytopenia, unspecified --  --    Lumbago --  --    Lumbar Spinal Stenosis --  --    Lung cancer --  CHEMO   Lung disease --  --    Migraine Headaches --  --    Osteopenia --  --    Thyroid nodule --  --    Vascular disease, peripheral --  --    Vitamin D Deficiency --  --          Past Surgical History  Procedure Name Date Notes   *Other --  Fluid drained from heart   Abdomen --  --    Appendectomy --  --    Cardiac Catherization 1996 --    Cataract surgery 2003 --    Colonoscopy 2014 --    EGD 2016 2019 --    heart surgery --  --    Hysterectomy --  --    Knee replacement, left  2016 --    Knee surgery 2016 Left   lung biopsy 2005 LOBECTOMY UPPER LUNG CANCER   Lung reduction surgery 2005 --          Medication List  Name Date Started Instructions   albuterol sulfate 2.5 mg /3 mL (0.083 %) inhalation solution for nebulization 03/02/2020 inhale 3 milliliters (2.5 mg) by nebulization route 4 times per day for 90 days DX COPD J44.9   amlodipine 5 mg oral tablet 01/30/2020 TAKE 1 TABLET ONCE DAILY   Aspir-81 81 mg oral tablet,delayed release (DR/EC)  take 1 tablet (81 mg) by oral route once daily   atorvastatin 20 mg oral tablet 07/17/2020 TAKE 1 TABLET AT BEDTIME   Dexilant 60 mg oral capsule,biphase delayed releas 01/30/2020 TAKE 1 CAPSULE DAILY   fenofibrate 120 mg oral tablet 01/30/2020 TAKE 1 TABLET DAILY   ferrous sulfate 325 mg (65 mg iron) oral tablet 01/28/2020 take 1 tablet (325 mg) by oral route 2 times per day for 30 days   furosemide 20 mg oral tablet 03/02/2020 take 1 tablet (20 mg) by oral route once daily for 90 days   ipratropium bromide 0.02 % inhalation solution 03/02/2020 inhale 1.25 milliliters (250 mcg) via nebulizer by inhalation route 4 times per day for 90 days DX COPD J44.9   levocetirizine 5 mg oral tablet 01/30/2020 TAKE 1 TABLET DAILY   losartan 25 mg oral tablet 03/02/2020 TAKE 1 TABLET ONCE DAILY for 90 days   magnesium 200 mg oral tablet 03/02/2020 take 1 tablet by oral route daily for 90 days   Manual Wheelchair 08/30/2019 UAD DX M19.90; M54.5   metoprolol succinate 50 mg oral tablet extended release 24 hr 03/02/2020 take 1.5 tab (75mg) PO QDAY HS   Nitrostat 0.4 mg sublingual tablet, sublingual  place 1 tablet (0.4 mg) by buccal route at the first sign of an attack; no more than 3 tabs are recommended within a 15 minute period.   Norco 5-325 mg oral tablet 09/02/2020 take 1 tablet by oral route 2 times a day as needed   One Touch Test Strips 0 02/14/2020 Dx: E11.9 use as directed to test blood sugar once a day as needed   Pepcid 40 mg oral tablet 03/20/2020 take  1 tablet (40 mg) by oral route once daily at bedtime   ranitidine HCl 150 mg oral tablet 2020 TAKE 1 TABLET TWICE A DAY   Symbicort 160-4.5 mcg/actuation inhalation HFA aerosol inhaler 2017 inhale 2 puffs by inhalation route 2 times per day in the morning and evening   Tradjenta 5 mg oral tablet 2020 take 1 tablet (5 mg) by oral route once daily for 90 days   Trulicity 1.5 mg/0.5 mL subcutaneous pen injector 2020 inject 0.5 mL (1.5 mg) by subcutaneous route every 7 days in the abdomen, thigh, or upper arm rotating injection sites   Ventolin HFA 90 mcg/actuation inhalation HFA aerosol inhaler 2017 inhale 1 puff (90 mcg) by inhalation route every 6 hours as needed   Vitamin D2 50,000 unit oral capsule 2020 take 1 capsule (50,000 unit) by oral route once weekly   Voltaren 1 % topical gel 2019 apply 2 gram to the affected area(s) by topical route 4 times per day   Zithromax Z-Jonathan 250 mg oral tablet 2020 take 2 tablets (500 mg) by oral route once daily for 1 day then 1 tablet (250 mg) by oral route once daily for 4 days         Allergy List  Allergen Name Date Reaction Notes   NO KNOWN DRUG ALLERGIES --  --  --          Family Medical History  Disease Name Relative/Age Notes   - No Family History of Colorectal Cancer  --    Prostate cancer Brother/   2 Brothers   Family history of certain chronic disabling diseases; arthritis Father/  Mother/   Mother; Father         Reproductive History  Menstrual   Method of Birth Control: Other   Pregnancy Summary   Total Pregnancies: 0 Full Term: 0 Premature: 0   Ab Induced: 0 Ab Spontaneous: 0 Ectopics: 0   Multiples: 0 Livin         Social History  Finding Status Start/Stop Quantity Notes   Alcohol Use Never --/-- --  does not drink   Claustophobic Unknown --/-- --  yes   Homemaker --  --/-- --  --    Homemaker. --  --/-- --  --    lives with spouse --  --/-- --  --     --  --/-- --  --    . --  --/-- --  --   "  Recreational Drug Use Never --/-- --  no   Tobacco Former --/-- 1 PPD former smoker, 1 packs per day, smoked 21-30 years  former smoker  former smoker  QUIT 2004         Review of Systems  · Constitutional  o Denies  o : fever, fatigue, weight loss, weight gain  · Cardiovascular  o Denies  o : lower extremity edema, claudication, chest pressure, palpitations  · Respiratory  o Denies  o : shortness of breath, wheezing, cough, hemoptysis, dyspnea on exertion  · Gastrointestinal  o Denies  o : nausea, vomiting, diarrhea, constipation, abdominal pain      Vitals  Date Time BP Position Site L\R Cuff Size HR RR TEMP (F) WT  HT  BMI kg/m2 BSA m2 O2 Sat HC       03/27/2020 01:59 /72 Sitting    100 - R  98.5 160lbs 0oz 5'  5\" 26.63 1.82 98 %    04/02/2020 10:18 /84 Sitting    81 - R  96.7 168lbs 0oz 5'  5\" 27.96 1.87 97 %    09/02/2020 01:38 /86 Sitting    110 - R  98.3 170lbs 0oz 5'  5\" 28.29 1.88 92 %          Physical Examination  · Constitutional  o Appearance  o : no acute distress, well-nourished  · Head and Face  o Head  o :   § Inspection  § : atraumatic, normocephalic  · Eyes  o Eyes  o : extraocular movements intact, no scleral icterus, no conjunctival injection  · Ears, Nose, Mouth and Throat  o Ears  o :   § External Ears  § : normal  o Nose  o :   § Intranasal Exam  § : nares patent  o Oral Cavity  o :   § Oral Mucosa  § : moist mucous membranes  · Respiratory  o Respiratory Effort  o : breathing comfortably, symmetric chest rise  o Auscultation of Lungs  o : clear to asculatation bilaterally, no wheezes, rales, or rhonchii  · Cardiovascular  o Heart  o :   § Auscultation of Heart  § : regular rate and rhythm, no murmurs, rubs, or gallops  o Peripheral Vascular System  o :   § Extremities  § : no edema  · Neurologic  o Mental Status Examination  o :   § Orientation  § : grossly oriented to person, place and time  o Gait and Station  o :   § Gait Screening  § : normal " gait  · Psychiatric  o General  o : normal mood and affect          Assessment  · Hypertension     401.9/I10  well controlled  · Long term (current) use of opiate analgesic     V58.69/Z79.891  will refill Norco    Problems Reconciled  Plan  · Orders  o ACO-39: Current medications updated and reviewed () - - 09/02/2020  · Medications  o Norco 5-325 mg oral tablet   SIG: take 1 tablet by oral route 2 times a day as needed   DISP: (30) tablets with 0 refills  Refilled on 09/02/2020     o Medications have been Reconciled  o Transition of Care or Provider Policy  · Instructions  o Take all medications as prescribed/directed.  o Patient was educated/instructed on their diagnosis, treatment and medications prior to discharge from the clinic today.  o Call the office with any concerns or questions.  · Disposition  o Follow up in 6 months  o Prescriptions sent electronically            Electronically Signed by: Paloma James MD -Author on September 2, 2020 02:23:42 PM

## 2021-05-13 NOTE — PROGRESS NOTES
Progress Note      Patient Name: Charlette Rai   Patient ID: [id number]   Sex: Female   YOB: 1935    Primary Care Provider: Paloma James MD   Referring Provider: Paloma James MD    Visit Date: December 7, 2020    Provider: Paloma James MD   Location: [facility]   Location Address: [address] [place] [address]  [id number]   Location Phone: [phone]          Chief Complaint  · Annual Wellness Exam      History Of Present Illness  The patient is a 85 year old /White female who has come to this office for her Annual Wellness Visit.   Her Primary Care Provider is Paloma James MD. Her comprehensive Care Team list, including suppliers, has been updated on the Facesheet. Her medical/family history, height, weight, BMI, and blood pressure have been reviewed and are in the chart. The Health Risk Assessment has been completed and scanned in the chart.   Medications are listed in the medication list.   The active problem list includes: Allergic rhinitis, Anemia, Arthritis, Asthma, Cancer, Cataract, CHF (congestive heart failure), Chronic kidney disease, stage 3 (moderate), COPD (chronic obstructive pulmonary disease), Diabetes mellitus type 2 with complications, Essential hypertension, GERD (gastroesophageal reflux disease), Hyperlipidemia, Hypertension, Joint Pain, Multiple Sites, Leukocytopenia, unspecified, Lung cancer, Lung disease, Migraine Headaches, Osteopenia, Thyroid nodule, Vascular disease, peripheral, and Vitamin D Deficiency   The patient does not have a history of substance use.   Patient reports her diet is adequate.   The Mini-Cog has been administered and is scanned in chart. The results are negative. Her cognitive function is without limitation.   A hearing loss screen was completed today and the result is negative.   Patient does not have any risk factors for depression. Patient completed the PHQ-9 today and it has been  "scanned in the chart. The total score is 0.   The Timed Up and Go screen was administered today and the result is negative.   The Cerda Index of [place] in ADLs indicated full function (score of 6).   A Falls Risk Assessment has been completed, including a review of home fall hazards and medication review.   Overall, the patient's functional ability is noted by this provider to be within normal limits. Her level of safety is noted to be within normal limits. Her balance/gait is within normal limits. There have been no falls in the past year. Patient-specific home safety recommendations have been reviewed and a copy has been given to patient.   She denies issues with leaking urine.   There are no additional risk factors identified.   Living Will/Advanced Directive previously executed but not in chart.   Personalized health advice was given to the patient and a written health screening schedule was established; see Plan for details.       Allergy List    Allergies Reconciled  Vitals  Date Time BP Position Site L\R Cuff Size HR RR TEMP (F) WT  HT  BMI kg/m2 BSA m2 O2 Sat FR L/min FiO2 HC       12/07/2020 01:53 /60 Sitting    88 - R 18 98.3 165lbs 16oz 5'  5\" 27.62 1.86 96 %                Assessment  · Encounter for Medicare annual wellness exam     V70.0/Z00.00      Plan  · Orders  o Falls Risk Assessment Completed (4108F) - V70.0/Z00.00 - 12/07/2020  o Brief hearing screening (written) [facility] () - V70.0/Z00.00 - 12/07/2020  o Annual Wellness Visit-includes a Personalized Prevention Plan of Service (PPS), SUBSEQUENT VISIT (Medicare) () - V70.0/Z00.00 - 12/07/2020  o Presence or absence of urinary incontinence assessed (JAMES) (1090F) - V70.0/Z00.00 - 12/07/2020  o ACO-39: Current medications updated and reviewed (, 1159F) - - 12/07/2020  o ACO-19: Colorectal cancer screening results documented and reviewed (3017F) - - 12/07/2020  o ACO-20: Screening Mammography documented and reviewed () - - " 12/07/2020  o Influenza immunization was ordered or administered () - - 12/07/2020  o ACO-15: Pneumococcal Vaccine Administered or Previously Received (4040F) - - 12/07/2020  · Medications  o Medications have been Reconciled  o Transition of Care or Provider Policy  · Instructions  o Health Risk Assessment has been reviewed with the patient.  o Written health screening schedule for next 5-10 years was established with patient; information scanned in chart and given/mailed to patient.  o Fall prevention methods discussed and a copy of recommendations given/mailed to patient.            Electronically Signed by: Paloma James MD -Author on December 9, 2020 03:30:37 PM

## 2021-05-14 VITALS
WEIGHT: 166 LBS | OXYGEN SATURATION: 96 % | HEIGHT: 65 IN | DIASTOLIC BLOOD PRESSURE: 60 MMHG | BODY MASS INDEX: 27.66 KG/M2 | RESPIRATION RATE: 18 BRPM | HEART RATE: 88 BPM | TEMPERATURE: 98.3 F | SYSTOLIC BLOOD PRESSURE: 132 MMHG

## 2021-05-14 VITALS — HEIGHT: 65 IN | WEIGHT: 169.19 LBS | OXYGEN SATURATION: 93 % | BODY MASS INDEX: 28.19 KG/M2 | HEART RATE: 64 BPM

## 2021-05-14 VITALS — OXYGEN SATURATION: 90 % | WEIGHT: 167 LBS | BODY MASS INDEX: 27.82 KG/M2 | HEART RATE: 45 BPM | HEIGHT: 65 IN

## 2021-05-14 VITALS — HEART RATE: 74 BPM | WEIGHT: 166 LBS | HEIGHT: 65 IN | OXYGEN SATURATION: 95 % | BODY MASS INDEX: 27.66 KG/M2

## 2021-05-14 VITALS
TEMPERATURE: 98.3 F | DIASTOLIC BLOOD PRESSURE: 86 MMHG | WEIGHT: 170 LBS | OXYGEN SATURATION: 92 % | HEART RATE: 110 BPM | HEIGHT: 65 IN | BODY MASS INDEX: 28.32 KG/M2 | SYSTOLIC BLOOD PRESSURE: 142 MMHG

## 2021-05-14 VITALS — BODY MASS INDEX: 28.35 KG/M2 | HEIGHT: 65 IN | WEIGHT: 170.12 LBS | OXYGEN SATURATION: 92 % | HEART RATE: 102 BPM

## 2021-05-14 VITALS
TEMPERATURE: 98.6 F | SYSTOLIC BLOOD PRESSURE: 154 MMHG | HEART RATE: 101 BPM | OXYGEN SATURATION: 97 % | BODY MASS INDEX: 27.66 KG/M2 | DIASTOLIC BLOOD PRESSURE: 68 MMHG | WEIGHT: 166 LBS | HEIGHT: 65 IN

## 2021-05-14 VITALS — HEART RATE: 97 BPM | BODY MASS INDEX: 28.03 KG/M2 | HEIGHT: 65 IN | WEIGHT: 168.25 LBS | OXYGEN SATURATION: 94 %

## 2021-05-14 VITALS
WEIGHT: 171 LBS | DIASTOLIC BLOOD PRESSURE: 66 MMHG | BODY MASS INDEX: 28.49 KG/M2 | SYSTOLIC BLOOD PRESSURE: 142 MMHG | HEIGHT: 65 IN | HEART RATE: 95 BPM

## 2021-05-14 NOTE — PROGRESS NOTES
Progress Note      Patient Name: Charlette Rai   Patient ID: [id number]   Sex: Female   YOB: 1935    Primary Care Provider: Paloma James MD   Referring Provider: Gabbi Jean-Baptiste PA-C    Visit Date: April 16, 2021    Provider: Brennan Sampson PA-C   Location: [facility]   Location Address: [address] [place] [address]  [id number]   Location Phone: [phone]          Chief Complaint  · right hip pain      History Of Present Illness  Charlette Rai is a 86 year old /White female who presents today to [facility].      Patient presents today for a follow-up of right hip osteoarthritis and pain. Patient is status post intra-articular injection performed on 4/5/2021 by radiology. Patient states she got 4 days of complete relief and after which some of her symptoms have returned. She states her symptoms are much improved compared to symptoms prior to the injection.  Patient also complains of bilateral hand numbness, tingling and pain. Left symptoms worse than the right. She noticed significant increase in symptoms in the past week with no identifiable precipitating injury.       Past Medical History  Allergic rhinitis; Anemia; Anemia, Unspecified; Arthritis; Asthma; Bladder disorder; Cancer; Cataract; CHF (congestive heart failure); Chronic Obstructive Pulmonary Disease; CKD (chronic kidney disease) stage 4, GFR 15-29 ml/min; Congestive heart failure; COPD (chronic obstructive pulmonary disease); Diabetes; Diabetes mellitus type 2 with complications; Essential hypertension; Gastric Ulcer; GERD (gastroesophageal reflux disease); Hyperlipemia; Hyperlipidemia; Hypertension; Joint Pain, Multiple Sites; Kidney Disease; Leukocytopenia, unspecified; Limb Swelling; Lumbago; Lumbar Spinal Stenosis; Lung cancer; Lung disease; Migraine Headaches; Osteopenia; Reflux; Shortness of Breath; Thyroid disorder; Thyroid nodule; Ulcer; Vascular disease, peripheral; Vitamin D  Deficiency         Past Surgical History  *Other; Abdomen; Appendectomy; Artificial Joints/Limbs; Cardiac Catherization; Cataract surgery; Colonoscopy; EGD; heart surgery; Hysterectomy; Joint Surgery; Knee replacement, left; Knee surgery; lung biopsy; Lung reduction surgery         Medication List  albuterol sulfate 2.5 mg /3 mL (0.083 %) inhalation solution for nebulization; amlodipine 5 mg oral tablet; Aspir-81 81 mg oral tablet,delayed release (DR/EC); atorvastatin 20 mg oral tablet; Dexilant 60 mg oral capsule,biphase delayed releas; fenofibrate 120 mg oral tablet; ferrous sulfate 325 mg (65 mg iron) oral tablet; furosemide 20 mg oral tablet; ipratropium bromide 0.02 % inhalation solution; levocetirizine 5 mg oral tablet; magnesium 200 mg oral tablet; Manual Wheelchair; metoprolol succinate 50 mg oral tablet extended release 24 hr; Mobic 15 mg oral tablet; Nitrostat 0.4 mg sublingual tablet, sublingual; Norco 5-325 mg oral tablet; One Touch Test Strips 0; OneTouch Ultra Blue Test Strip miscellaneous strip; Pepcid 40 mg oral tablet; Symbicort 160-4.5 mcg/actuation inhalation HFA aerosol inhaler; Tradjenta 5 mg oral tablet; tramadol 50 mg oral tablet; Trulicity 1.5 mg/0.5 mL subcutaneous pen injector; Ventolin HFA 90 mcg/actuation inhalation HFA aerosol inhaler; Vitamin D2 1,250 mcg (50,000 unit) oral capsule; Voltaren 1 % topical gel         Allergy List  NO KNOWN DRUG ALLERGIES       Allergies Reconciled  Family Medical History  Cancer, Unspecified; Diabetes, unspecified type; - No Family History of Colorectal Cancer; Prostate cancer; Blood Diseases; Family history of certain chronic disabling diseases; arthritis         Reproductive History   0 Para 0 0 0 0       Social History  Alcohol Use (Never); Claustophobic (Unknown); Homemaker; Homemaker.; lives with spouse; ; .; Recreational Drug Use (Never); Retired.; Tobacco (Former)         Review of Systems  · Constitutional  o Denies  o :  "fever, chills, weight loss  · Cardiovascular  o Denies  o : chest pain, shortness of breath  · Gastrointestinal  o Denies  o : liver disease, heartburn, nausea, blood in stools  · Genitourinary  o Denies  o : painful urination, blood in urine  · Integument  o Denies  o : rash, itching  · Neurologic  o Denies  o : headache, weakness, loss of consciousness  · Musculoskeletal  o Denies  o : painful, swollen joints  · Psychiatric  o Denies  o : drug/alcohol addiction, anxiety, depression      Vitals  Date Time BP Position Site L\R Cuff Size HR RR TEMP (F) WT  HT  BMI kg/m2 BSA m2 O2 Sat FR L/min FiO2        04/16/2021 10:29 AM      97 - R   168lbs 4oz 5'  5\" 28 1.87 94 %            Physical Examination  · Constitutional  o Appearance  o : well developed, well-nourished, no obvious deformities present  · Head and Face  o Head  o :   § Inspection  § : normocephalic  o Face  o :   § Inspection  § : no facial lesions  · Eyes  o Conjunctivae  o : conjunctivae normal  o Sclerae  o : sclerae white  · Ears, Nose, Mouth and Throat  o Ears  o :   § External Ears  § : appearance within normal limits  § Hearing  § : intact  o Nose  o :   § External Nose  § : appearance normal  · Neck  o Inspection/Palpation  o : normal appearance  o Range of Motion  o : full range of motion  · Respiratory  o Respiratory Effort  o : breathing unlabored  o Inspection of Chest  o : normal appearance  o Auscultation of Lungs  o : no audible wheezing or rales  · Cardiovascular  o Heart  o : regular rate  · Gastrointestinal  o Abdominal Examination  o : soft and non-tender  · Skin and Subcutaneous Tissue  o General Inspection  o : intact, no rashes  · Psychiatric  o General  o : Alert and oriented x3  o Judgement and Insight  o : judgment and insight intact  o Mood and Affect  o : mood normal, affect appropriate  · Right Hand  o Inspection  o : Normal in anatomic and atraumatic. Full range of motion. Neurovascular intact. Positive carpal tunnel " compression. Positive Phalens. Positive Tinels.   · Left Hand  o Inspection  o : Normal in anatomic and atraumatic. Neurovascular intact. Positive carpal tunnel compression. Positive Phalens. Positive Tinels.           Assessment  · Primary osteoarthritis of left hip     715.15/M16.10  · Right Pain: Hip     719.45/M25.559  · Bilateral Hand Numbness     782.0/R20.0  · Bilateral Hand Tingling     782.0/R20.2      Plan  · Medications  o Medications have been Reconciled  o Transition of Care or Provider Policy  · Instructions  o Reviewed the patient's Past Medical, Social, and Family history as well as the ROS at today's visit, no changes.  o Call or return if worsening symptoms.  o Follow Up PRN.  o This note was transcribed by Mariana Sousa. ch  o In extensive conversation with the patient and natural history of carpal tunnel syndrome was discussed in detail with multiple treatment options including the risks, benefits and likely outcomes of each. Patient has a history of stage 4 kidney disease which was also discussed in the context of her other symptoms. Patient has a prescription for topical Diclofenac in which she is to use and will be provided with a carpal tunnel brace for her more symptomatic left hand. Patient occasionally uses Narcotic pain medication in which she will be continuing to be prescribed by her PCP. Patient states her understanding and agreement with this plan.             Electronically Signed by: Mariana Sousa-, Other -Author on April 22, 2021 02:21:12 PM  Electronically Co-signed by: Brennan Sampson PA-C -Reviewer on April 22, 2021 05:21:59 PM  Electronically Co-signed by: Edda Jimenez MD -Reviewer on April 22, 2021 10:20:23 PM

## 2021-05-14 NOTE — PROGRESS NOTES
Progress Note      Patient Name: Charlette Rai   Patient ID: [id number]   Sex: Female   YOB: 1935    Primary Care Provider: Paloma James MD   Referring Provider: Gabbi Jean-Baptiste PA-C    Visit Date: March 26, 2021    Provider: Edda Jimenez MD   Location: [facility]   Location Address: [address] [place] [address]  [id number]   Location Phone: [phone]          Chief Complaint  · Right Leg Pain - MRI Results      History Of Present Illness  Charlette Rai is a 86 year old /White female who presents today to [facility]. We talked about weight reduction. We talked about injections and anti-inflammatories. We will try to get visco-supplementation approved since she did have some success with cortisone shot.       Past Medical History  Allergic rhinitis; Anemia; Anemia, Unspecified; Arthritis; Asthma; Bladder disorder; Cancer; Cataract; CHF (congestive heart failure); Chronic Obstructive Pulmonary Disease; CKD (chronic kidney disease) stage 4, GFR 15-29 ml/min; Congestive heart failure; COPD (chronic obstructive pulmonary disease); Diabetes; Diabetes mellitus type 2 with complications; Essential hypertension; Gastric Ulcer; GERD (gastroesophageal reflux disease); Hyperlipemia; Hyperlipidemia; Hypertension; Joint Pain, Multiple Sites; Kidney Disease; Leukocytopenia, unspecified; Limb Swelling; Lumbago; Lumbar Spinal Stenosis; Lung cancer; Lung disease; Migraine Headaches; Osteopenia; Reflux; Shortness of Breath; Thyroid disorder; Thyroid nodule; Ulcer; Vascular disease, peripheral; Vitamin D Deficiency         Past Surgical History  *Other; Abdomen; Appendectomy; Artificial Joints/Limbs; Cardiac Catherization; Cataract surgery; Colonoscopy; EGD; heart surgery; Hysterectomy; Joint Surgery; Knee replacement, left; Knee surgery; lung biopsy; Lung reduction surgery         Medication List  albuterol sulfate 2.5 mg /3 mL (0.083 %) inhalation solution for  nebulization; amlodipine 5 mg oral tablet; Aspir-81 81 mg oral tablet,delayed release (DR/EC); atorvastatin 20 mg oral tablet; Dexilant 60 mg oral capsule,biphase delayed releas; fenofibrate 120 mg oral tablet; ferrous sulfate 325 mg (65 mg iron) oral tablet; furosemide 20 mg oral tablet; ipratropium bromide 0.02 % inhalation solution; levocetirizine 5 mg oral tablet; magnesium 200 mg oral tablet; Manual Wheelchair; metoprolol succinate 50 mg oral tablet extended release 24 hr; Mobic 15 mg oral tablet; Nitrostat 0.4 mg sublingual tablet, sublingual; Norco 5-325 mg oral tablet; One Touch Test Strips 0; OneTouch Ultra Blue Test Strip miscellaneous strip; Pepcid 40 mg oral tablet; Symbicort 160-4.5 mcg/actuation inhalation HFA aerosol inhaler; Tradjenta 5 mg oral tablet; tramadol 50 mg oral tablet; Trulicity 1.5 mg/0.5 mL subcutaneous pen injector; Ventolin HFA 90 mcg/actuation inhalation HFA aerosol inhaler; Vitamin D2 1,250 mcg (50,000 unit) oral capsule; Voltaren 1 % topical gel         Allergy List  NO KNOWN DRUG ALLERGIES         Family Medical History  Cancer, Unspecified; Diabetes, unspecified type; - No Family History of Colorectal Cancer; Prostate cancer; Blood Diseases; Family history of certain chronic disabling diseases; arthritis         Reproductive History   0 Para 0 0 0 0       Social History  Alcohol Use (Never); Claustophobic (Unknown); Homemaker; Homemaker.; lives with spouse; ; .; Recreational Drug Use (Never); Retired.; Tobacco (Former)         Review of Systems  · Constitutional  o Denies  o : fever, chills, weight loss  · Cardiovascular  o Denies  o : chest pain, shortness of breath  · Gastrointestinal  o Denies  o : liver disease, heartburn, nausea, blood in stools  · Genitourinary  o Denies  o : painful urination, blood in urine  · Integument  o Denies  o : rash, itching  · Neurologic  o Denies  o : headache, weakness, loss of consciousness  · Musculoskeletal  o Denies  o :  "painful, swollen joints  · Psychiatric  o Denies  o : drug/alcohol addiction, anxiety, depression      Vitals  Date Time BP Position Site L\R Cuff Size HR RR TEMP (F) WT  HT  BMI kg/m2 BSA m2 O2 Sat FR L/min FiO2 HC       03/26/2021 10:40 AM      74 - R   165lbs 16oz 5'  5\" 27.62 1.86 95 %            Physical Examination  · Constitutional  o Appearance  o : well developed, well-nourished, no obvious deformities present  · Head and Face  o Head  o :   § Inspection  § : normocephalic  o Face  o :   § Inspection  § : no facial lesions  · Eyes  o Conjunctivae  o : conjunctivae normal  o Sclerae  o : sclerae white  · Ears, Nose, Mouth and Throat  o Ears  o :   § External Ears  § : appearance within normal limits  § Hearing  § : intact  o Nose  o :   § External Nose  § : appearance normal  · Neck  o Inspection/Palpation  o : normal appearance  o Range of Motion  o : full range of motion  · Respiratory  o Respiratory Effort  o : breathing unlabored  o Inspection of Chest  o : normal appearance  o Auscultation of Lungs  o : no audible wheezing or rales  · Cardiovascular  o Heart  o : regular rate  · Gastrointestinal  o Abdominal Examination  o : soft and non-tender  · Skin and Subcutaneous Tissue  o General Inspection  o : intact, no rashes  · Psychiatric  o General  o : Alert and oriented x3  o Judgement and Insight  o : judgment and insight intact  o Mood and Affect  o : mood normal, affect appropriate  · Right Hip  o Inspection  o : Sensation intact. Pulse is normal. Tender to palpation.   · Imaging  o Imaging  o : MRI right femur: Normal MRI. MRI of right hip: 1) Moderate right hip effusion. 2 ) Moderate degenerative changes in the right hip with chondral loss and Subchondral edema. Tear/ degeneration of the inferior labrum of the right hip. 3) Edema/strain in the right hip adductor muscles.           Assessment  · Primary osteoarthritis of right hip     715.15/M16.10  · Pain of right lower " extremity     729.5/M79.604      Plan  · Medications  o Medications have been Reconciled  o Transition of Care or Provider Policy  · Instructions  o Reviewed the patient's Past Medical, Social, and Family history as well as the ROS at today's visit, no changes.  o Call or return if worsening symptoms.  o This note is transcribed by Paola Russell /pineda  o Going to set her up for a shot. See her back after the shot to see what kind of relief she got.             Electronically Signed by: Paola Russell, -Author on March 30, 2021 02:50:59 PM  Electronically Co-signed by: Edda Jimenez MD -Reviewer on March 30, 2021 09:06:33 PM

## 2021-05-14 NOTE — PROGRESS NOTES
"   Progress Note      Patient Name: Charlette Rai   Patient ID: [id number]   Sex: Female   YOB: 1935    Primary Care Provider: Paloma James MD   Referring Provider: Gabbi Jean-Baptiste PA-C    Visit Date: March 8, 2021    Provider: Paloma James MD   Location: [facility]   Location Address: [address] [place] [address], [place] [id number]   Location Phone: [phone]          Chief Complaint  · follow up  · DM2  · HTN  · HLD  · Vitamin D      History Of Present Illness  Charlette Rai is a 86 year old /White female who presents for evaluation and treatment of:      Anemia: has been taking iron supplement weekly instead of once per day    Vitamin D deficiency: has been taking her weekly supplement daily    DM: on Trulicity and Trajenta, well controlled per patient, due for labs today  saw podiatry 2 months ago    HTN: well controlled in clinic today, tolerating medication well, denies headache, chest pain, dizziness, vision changes    HLD: son statin, tolerating well, denies muscle pain/weakness    Arthritis: uses Norco PRN for pain due to inability to take NSAIDS due to CKD, doing well with this althoughshe does have joint pain    CKD: previously stage 3               Vitals  Date Time BP Position Site L\R Cuff Size HR RR TEMP (F) WT  HT  BMI kg/m2 BSA m2 O2 Sat FR L/min FiO2 HC       01/12/2021 03:14 PM      64 - R   169lbs 3oz 5'  5\" 28.15 1.88 93 %      02/23/2021 02:57 PM      102 - R   170lbs 2oz 5'  5\" 28.31 1.88 92 %  21%    03/08/2021 01:22 /68 Sitting    101 - R  98.6 165lbs 16oz 5'  5\" 27.62 1.86 97 %            Physical Examination  · Constitutional  o Appearance  o : no acute distress, well-nourished  · Head and Face  o Head  o :   § Inspection  § : atraumatic, normocephalic  · Eyes  o Eyes  o : extraocular movements intact, no scleral icterus, no conjunctival injection  · Ears, Nose, Mouth and Throat  o Ears  o :   § External Ears  § : " normal  o Nose  o :   § Intranasal Exam  § : nares patent  o Oral Cavity  o :   § Oral Mucosa  § : moist mucous membranes  o Throat  o :   § Oropharynx  § : no inflammation or lesions present, tonsils within normal limits  · Neck  o Thyroid  o : gland size normal, nontender, no nodules or masses present on palpation, symmetric  · Respiratory  o Respiratory Effort  o : breathing comfortably, symmetric chest rise  o Auscultation of Lungs  o : clear to asculatation bilaterally, no wheezes, rales, or rhonchii  · Cardiovascular  o Heart  o :   § Auscultation of Heart  § : regular rate and rhythm, no murmurs, rubs, or gallops  o Peripheral Vascular System  o :   § Extremities  § : no edema  · Lymphatic  o Neck  o : no lymphadenopathy present  · Neurologic  o Mental Status Examination  o :   § Orientation  § : grossly oriented to person, place and time  o Gait and Station  o :   § Gait Screening  § : normal gait  · Psychiatric  o General  o : normal mood and affect          Results  · In-Office Procedures  o Lab procedure  § IOP - Urine Drug Screen In-House [facility] ([address])   § Amphetamines Ur Ql: Negative   § Barbiturates Ur Ql: Negative   § Buprenorphine+Nor Ur Ql Scn: Negative   § Benzodiaz Ur Ql: Negative   § Cocaine Ur Ql: Negative   § Methadone Ur Ql: Negative   § Methamphet Ur Ql: Negative   § MDMA Ur Ql Scn: Negative   § Opiates Ur Ql: Negative   § Oxycodone Ur Ql: Negative   § PCP Ur Ql: Negative   § THC Ur Ql: Negative   § Temp in Range?: Within/Acceptable   § Control Seen?: Yes       Assessment  · Chronic kidney disease, stage 3 (moderate)     585.3/N18.3  checking labs to monitor  · Hyperlipidemia     272.4/E78.5  on statin, tolerating well  checking labs today to monitor medication effect as well as evaluate for changes in LFTs due to medication side effect  · Hypertension     401.9/I10  well controlled  tolerating medication well  continue current management  · Arthritis     716.90/M19.90  patient takes Norco PRN  for pain due to contraindication to NSAIDs due to CKD  needs refill today  JOSE reviewed and appropriate, UDS and consent up to date  refill e-prescribed to pharmacy  · GERD (gastroesophageal reflux disease)     530.81/K21.9  · Diabetes mellitus type 2 with complications     250.90/E11.8  checking labs today to monitor disease control  tolerating current medications well  · Vitamin D Deficiency     268.9/E55.9  checking labs today  will determine need for continued supplement based on lab results, if needed patient instructed that this is a once WEEKLY supplement  · Anemia     285.9/D64.9  Checking labs today including CBC and iron profile  will determine need for continued iron supplementation based on lab results. Discussed that if a supplement is prescribed, this is a DAILY or Twice Daily supplement  · Diabetes mellitus, type 2     250.00/E11.9  · Iron deficiency     280.9/E61.1    Problems Reconciled  Plan  · Orders  o Vitamin D Level (95240) - 268.9/E55.9 - 03/08/2021  o Diabetes 1 Panel (CMP, Lipid, A1c) [facility] (01063, 27044, 99634) - 250.00/E11.9, 272.4/E78.5, 401.9/I10, 530.81/K21.9 - 03/08/2021  o CBC with Auto Diff [facility] (39542) - 250.00/E11.9, 401.9/I10 - 03/08/2021  o Diabetic Foot (Motor and Sensory) Exam Completed [facility] (, , 2028F) - 250.00/E11.9 - 03/08/2021  o JOSE Report (KASPR) - - 03/08/2021  o ACO-39: Current medications updated and reviewed (1159F, ) - - 03/08/2021  o Iron Profile (Iron 32941 TIBC 93217 and Transferrin 00641) (IRONP) - 280.9/E61.1 - 03/08/2021  · Medications  o atorvastatin 20 mg oral tablet   SIG: TAKE 1 TABLET AT BEDTIME   DISP: (90) Tablet with 0 refills  Refilled on 03/08/2021     o Tradjenta 5 mg oral tablet   SIG: TAKE 1 TABLET ONCE DAILY   DISP: (90) Tablet with 1 refills  Refilled on 03/08/2021     o ranitidine HCl 150 mg oral tablet   SIG: TAKE 1 TABLET TWICE A DAY   DISP: (180) Tablet with 0 refills  Discontinued on 03/08/2021     · Instructions  o Advised  that cheeses and other sources of dairy fats, animal fats, fast food, and the extras (candy, pastries, pies, doughnuts and cookies) all contain LDL raising nutrients. Advised to increase fruits, vegetables, whole grains, and to monitor portion sizes.   o Take all medications as prescribed/directed.  o Patient was educated/instructed on their diagnosis, treatment and medications prior to discharge from the clinic today.  o Call the office with any concerns or questions.  · Disposition  o Follow up in 3 months  o Labs drawn in clinic  o Prescriptions sent electronically            Electronically Signed by: Paloma James MD -Author on March 9, 2021 11:31:22 AM

## 2021-05-14 NOTE — PROGRESS NOTES
Progress Note      Patient Name: Charlette Rai   Patient ID: [id number]   Sex: Female   YOB: 1935    Primary Care Provider: Paloma James MD   Referring Provider: Gabbi Jean-Baptiste PA-C    Visit Date: January 12, 2021    Provider: Padmaja Beard PA-C   Location: [facility]   Location Address: [address] [place] [address]  [id number]   Location Phone: [phone]          Chief Complaint  · Follow up right femur injury      History Of Present Illness  Charlette Rai is a 85 year old /White female who presents today to [facility].      Patient is following up for right thigh pain sustained from twisting the right leg November 2020. Patient states pain radiates from the right knee to the right groin. Patient states attending physical therapy.       Past Medical History  Allergic rhinitis; Anemia; Anemia, Unspecified; Arthritis; Asthma; Bladder Disorder; Cancer; Cataract; CHF (congestive heart failure); Chronic kidney disease, stage 3 (moderate); Chronic Obstructive Pulmonary Disease; Congestive heart failure; COPD (chronic obstructive pulmonary disease); Diabetes; Diabetes mellitus type 2 with complications; Essential hypertension; Gastric Ulcer; GERD (gastroesophageal reflux disease); Hyperlipemia; Hyperlipidemia; Hypertension; Joint Pain, Multiple Sites; Kidney Disease; Leukocytopenia, unspecified; Limb Swelling; Lumbago; Lumbar Spinal Stenosis; Lung cancer; Lung disease; Migraine Headaches; Osteopenia; Reflux; Shortness of Breath; Thyroid disorder; Thyroid nodule; Ulcer; Vascular disease, peripheral; Vitamin D Deficiency         Past Surgical History  *Other; Abdomen; Appendectomy; Artificial Joints/Limbs; Cardiac Catherization; Cataract surgery; Colonoscopy; EGD; heart surgery; Hysterectomy; Joint Surgery; Knee replacement, left; Knee surgery; lung biopsy; Lung reduction surgery         Medication List  albuterol sulfate 2.5 mg /3 mL (0.083 %) inhalation  solution for nebulization; amlodipine 5 mg oral tablet; Aspir-81 81 mg oral tablet,delayed release (DR/EC); atorvastatin 20 mg oral tablet; Dexilant 60 mg oral capsule,biphase delayed releas; fenofibrate 120 mg oral tablet; ferrous sulfate 325 mg (65 mg iron) oral tablet; furosemide 20 mg oral tablet; ipratropium bromide 0.02 % inhalation solution; levocetirizine 5 mg oral tablet; magnesium 200 mg oral tablet; Manual Wheelchair; metoprolol succinate 50 mg oral tablet extended release 24 hr; Mobic 15 mg oral tablet; Nitrostat 0.4 mg sublingual tablet, sublingual; Norco 5-325 mg oral tablet; One Touch Test Strips 0; OneTouch Ultra Blue Test Strip miscellaneous strip; Pepcid 40 mg oral tablet; ranitidine HCl 150 mg oral tablet; Symbicort 160-4.5 mcg/actuation inhalation HFA aerosol inhaler; Tradjenta 5 mg oral tablet; Trulicity 1.5 mg/0.5 mL subcutaneous pen injector; Ventolin HFA 90 mcg/actuation inhalation HFA aerosol inhaler; Vitamin D2 1,250 mcg (50,000 unit) oral capsule; Voltaren 1 % topical gel         Allergy List  NO KNOWN DRUG ALLERGIES         Family Medical History  Cancer, Unspecified; Diabetes, unspecified type; - No Family History of Colorectal Cancer; Prostate cancer; Blood Diseases; Family history of certain chronic disabling diseases; arthritis         Reproductive History   0 Para 0 0 0 0       Social History  Alcohol Use (Never); Claustophobic (Unknown); Homemaker; Homemaker.; lives with spouse; ; .; Recreational Drug Use (Never); Retired.; Tobacco (Former)         Review of Systems  · Constitutional  o Denies  o : fever, chills, weight loss  · Cardiovascular  o Denies  o : chest pain, shortness of breath  · Gastrointestinal  o Denies  o : liver disease, heartburn, nausea, blood in stools  · Genitourinary  o Denies  o : painful urination, blood in urine  · Integument  o Denies  o : rash, itching  · Neurologic  o Denies  o : headache, weakness, loss of  "consciousness  · Musculoskeletal  o Denies  o : painful, swollen joints  · Psychiatric  o Denies  o : drug/alcohol addiction, anxiety, depression      Vitals  Date Time BP Position Site L\R Cuff Size HR RR TEMP (F) WT  HT  BMI kg/m2 BSA m2 O2 Sat FR L/min FiO2 HC       01/12/2021 03:14 PM      64 - R   169lbs 3oz 5'  5\" 28.15 1.88 93 %            Physical Examination  · Constitutional  o Appearance  o : well developed, well-nourished, no obvious deformities present  · Head and Face  o Head  o :   § Inspection  § : normocephalic  o Face  o :   § Inspection  § : no facial lesions  · Eyes  o Conjunctivae  o : conjunctivae normal  o Sclerae  o : sclerae white  · Ears, Nose, Mouth and Throat  o Ears  o :   § External Ears  § : appearance within normal limits  § Hearing  § : intact  o Nose  o :   § External Nose  § : appearance normal  · Neck  o Inspection/Palpation  o : normal appearance  o Range of Motion  o : full range of motion  · Respiratory  o Respiratory Effort  o : breathing unlabored  o Inspection of Chest  o : normal appearance  o Auscultation of Lungs  o : no audible wheezing or rales  · Cardiovascular  o Heart  o : regular rate  · Gastrointestinal  o Abdominal Examination  o : soft and non-tender  · Skin and Subcutaneous Tissue  o General Inspection  o : intact, no rashes  · Psychiatric  o General  o : Alert and oriented x3  o Judgement and Insight  o : judgment and insight intact  o Mood and Affect  o : mood normal, affect appropriate  · Right Knee-Street  o Inspection  o : no limping gait, weight bearing, no swelling, no ecchymosis, no atrophy, neutral alignment  o Palpation  o : tenderness of quadriceps muscle, no medial joint line tenderness, no lateral joint line tenderness, no patellar tendon tenderness, no pain of MCL, no pain at LCL  o ROM  o : full extension, full flexion  o Strength  o : full extension, full flexion  o Neurovascular  o : Full sensation, Dorsal Pedal Pulse 2+, posteriror tibialis " pulse 2+          Assessment  · Right Pain: Hip     719.45/M25.559  · Pain: Knee     719.46/M25.569  · Quadriceps strain     843.8/S76.119A      Plan  · Medications  o Medications have been Reconciled  o Transition of Care or Provider Policy  · Instructions  o Reviewed the patient's Past Medical, Social, and Family history as well as the ROS at today's visit, no changes.  o Call or return if worsening symptoms.  o Follow up in 6 weeks.  o Electronically Identified Patient Education Materials Provided Electronically     continue physical therapy  Prescribed Tramadol 50mg one po q 8 hours PRN #20             Electronically Signed by: Padmaja Beard PA-C -Author on January 12, 2021 03:32:25 PM

## 2021-05-14 NOTE — PROGRESS NOTES
Progress Note      Patient Name: Charlette Rai   Patient ID: [id number]   Sex: Female   YOB: 1935    Primary Care Provider: Paloma James MD   Referring Provider: Gabbi Jean-Baptiste PA-C    Visit Date: February 23, 2021    Provider: Padmaja Beard PA-C   Location: [facility]   Location Address: [address] [place] [address]  [id number]   Location Phone: [phone]          Chief Complaint  · RIGHT LEG PAIN       History Of Present Illness  Charlette Rai is a 85 year old /White female who presents today to [facility].      Patient is following up for right thigh pain sustained from twisting the right leg November 2020. Patient states pain radiates from the right knee to the right groin. Patient states  physical therapy increasing right thigh and hip pain.            Past Medical History  Allergic rhinitis; Anemia; Anemia, Unspecified; Arthritis; Asthma; Bladder Disorder; Cancer; Cataract; CHF (congestive heart failure); Chronic kidney disease, stage 3 (moderate); Chronic Obstructive Pulmonary Disease; Congestive heart failure; COPD (chronic obstructive pulmonary disease); Diabetes; Diabetes mellitus type 2 with complications; Essential hypertension; Gastric Ulcer; GERD (gastroesophageal reflux disease); Hyperlipemia; Hyperlipidemia; Hypertension; Joint Pain, Multiple Sites; Kidney Disease; Leukocytopenia, unspecified; Limb Swelling; Lumbago; Lumbar Spinal Stenosis; Lung cancer; Lung disease; Migraine Headaches; Osteopenia; Reflux; Shortness of Breath; Thyroid disorder; Thyroid nodule; Ulcer; Vascular disease, peripheral; Vitamin D Deficiency         Past Surgical History  *Other; Abdomen; Appendectomy; Artificial Joints/Limbs; Cardiac Catherization; Cataract surgery; Colonoscopy; EGD; heart surgery; Hysterectomy; Joint Surgery; Knee replacement, left; Knee surgery; lung biopsy; Lung reduction surgery         Medication List  albuterol sulfate 2.5 mg /3 mL  (0.083 %) inhalation solution for nebulization; amlodipine 5 mg oral tablet; Aspir-81 81 mg oral tablet,delayed release (DR/EC); atorvastatin 20 mg oral tablet; Dexilant 60 mg oral capsule,biphase delayed releas; fenofibrate 120 mg oral tablet; ferrous sulfate 325 mg (65 mg iron) oral tablet; furosemide 20 mg oral tablet; ipratropium bromide 0.02 % inhalation solution; levocetirizine 5 mg oral tablet; magnesium 200 mg oral tablet; Manual Wheelchair; metoprolol succinate 50 mg oral tablet extended release 24 hr; Mobic 15 mg oral tablet; Nitrostat 0.4 mg sublingual tablet, sublingual; Norco 5-325 mg oral tablet; One Touch Test Strips 0; OneTouch Ultra Blue Test Strip miscellaneous strip; Pepcid 40 mg oral tablet; ranitidine HCl 150 mg oral tablet; Symbicort 160-4.5 mcg/actuation inhalation HFA aerosol inhaler; Tradjenta 5 mg oral tablet; tramadol 50 mg oral tablet; Trulicity 1.5 mg/0.5 mL subcutaneous pen injector; Ventolin HFA 90 mcg/actuation inhalation HFA aerosol inhaler; Vitamin D2 1,250 mcg (50,000 unit) oral capsule; Voltaren 1 % topical gel         Allergy List  NO KNOWN DRUG ALLERGIES         Family Medical History  Cancer, Unspecified; Diabetes, unspecified type; - No Family History of Colorectal Cancer; Prostate cancer; Blood Diseases; Family history of certain chronic disabling diseases; arthritis         Reproductive History   0 Para 0 0 0 0       Social History  Alcohol Use (Never); Claustophobic (Unknown); Homemaker; Homemaker.; lives with spouse; ; .; Recreational Drug Use (Never); Retired.; Tobacco (Former)         Review of Systems  · Constitutional  o Denies  o : fever, chills, weight loss  · Cardiovascular  o Denies  o : chest pain, shortness of breath  · Gastrointestinal  o Denies  o : liver disease, heartburn, nausea, blood in stools  · Genitourinary  o Denies  o : painful urination, blood in urine  · Integument  o Denies  o : rash, itching  · Neurologic  o Denies  o :  "headache, weakness, loss of consciousness  · Musculoskeletal  o Denies  o : painful, swollen joints  · Psychiatric  o Denies  o : drug/alcohol addiction, anxiety, depression      Vitals  Date Time BP Position Site L\R Cuff Size HR RR TEMP (F) WT  HT  BMI kg/m2 BSA m2 O2 Sat FR L/min FiO2 HC       02/23/2021 02:57 PM      102 - R   170lbs 2oz 5'  5\" 28.31 1.88 92 %  21%          Physical Examination  · Constitutional  o Appearance  o : well developed, well-nourished, no obvious deformities present  · Head and Face  o Head  o :   § Inspection  § : normocephalic  o Face  o :   § Inspection  § : no facial lesions  · Eyes  o Conjunctivae  o : conjunctivae normal  o Sclerae  o : sclerae white  · Ears, Nose, Mouth and Throat  o Ears  o :   § External Ears  § : appearance within normal limits  § Hearing  § : intact  o Nose  o :   § External Nose  § : appearance normal  · Neck  o Inspection/Palpation  o : normal appearance  o Range of Motion  o : full range of motion  · Respiratory  o Respiratory Effort  o : breathing unlabored  o Inspection of Chest  o : normal appearance  o Auscultation of Lungs  o : no audible wheezing or rales  · Cardiovascular  o Heart  o : regular rate  · Gastrointestinal  o Abdominal Examination  o : soft and non-tender  · Skin and Subcutaneous Tissue  o General Inspection  o : intact, no rashes  · Psychiatric  o General  o : Alert and oriented x3  o Judgement and Insight  o : judgment and insight intact  o Mood and Affect  o : mood normal, affect appropriate  · In Office Procedures  o View  o : AP/LATERAL  o Site  o : right, leg   o Indication  o : right leg pain  o Study  o : X-rays ordered, taken in the office, and reviewed today.  o Xray  o : negative fracture or dislocation  · Right Hip-Street  o Inspection  o : No scar  o Palpation  o : no tenderness of iliac crest, no tenderness of iliac tubercle, no tenderness of greater trochanter, no tenderness at pubic tubercle, no tenderness at PSIS, no " tenderness at ASIS, no tenderness at ischial tuberosity, no tenderness at sacroiliac joint, no tenderness at femoral triangle nerve, no tenderness at sciatic nerve, moderate tenderness at hip and pelvic muscles   o ROM  o : normal extension (30), normal abduction (45-50), normal adduction, normal internal rotation, normal external rotation  o Special Tests  o : limping heel/toe walk, pain with flexion, no pain with extension, pain with rotation  o Neurologic Testing  o : Neurovascular and sensation intact          Assessment  · Pain of right lower extremity     729.5/M79.604  · Hip pain     719.45/M25.559  · Thigh pain     729.5/M79.659      Plan  · Orders  o Xray femur right 2 views [facility] (63445) - 729.5/M79.604 - 02/23/2021  · Medications  o Medications have been Reconciled  o Transition of Care or Provider Policy  · Instructions  o Reviewed the patient's Past Medical, Social, and Family history as well as the ROS at today's visit, no changes.  o Call or return if worsening symptoms.  o Follow up after MRI.  o Electronically Identified Patient Education Materials Provided Electronically     MRI right hip/Femur             Electronically Signed by: Padmaja Beard PA-C -Author on February 23, 2021 03:29:05 PM

## 2021-05-15 VITALS
SYSTOLIC BLOOD PRESSURE: 138 MMHG | RESPIRATION RATE: 14 BRPM | TEMPERATURE: 98.1 F | BODY MASS INDEX: 24.99 KG/M2 | DIASTOLIC BLOOD PRESSURE: 68 MMHG | HEIGHT: 65 IN | HEART RATE: 110 BPM | OXYGEN SATURATION: 100 % | WEIGHT: 150 LBS

## 2021-05-15 VITALS
OXYGEN SATURATION: 97 % | DIASTOLIC BLOOD PRESSURE: 84 MMHG | WEIGHT: 168 LBS | HEART RATE: 81 BPM | BODY MASS INDEX: 27.99 KG/M2 | TEMPERATURE: 96.7 F | SYSTOLIC BLOOD PRESSURE: 132 MMHG | HEIGHT: 65 IN

## 2021-05-15 VITALS
SYSTOLIC BLOOD PRESSURE: 154 MMHG | BODY MASS INDEX: 27.32 KG/M2 | DIASTOLIC BLOOD PRESSURE: 72 MMHG | OXYGEN SATURATION: 99 % | HEART RATE: 98 BPM | WEIGHT: 164 LBS | HEIGHT: 65 IN | RESPIRATION RATE: 16 BRPM

## 2021-05-15 VITALS
SYSTOLIC BLOOD PRESSURE: 130 MMHG | WEIGHT: 162 LBS | RESPIRATION RATE: 12 BRPM | BODY MASS INDEX: 26.99 KG/M2 | DIASTOLIC BLOOD PRESSURE: 60 MMHG | HEIGHT: 65 IN | OXYGEN SATURATION: 94 % | TEMPERATURE: 97.3 F | HEART RATE: 112 BPM

## 2021-05-15 VITALS
RESPIRATION RATE: 16 BRPM | WEIGHT: 172 LBS | HEIGHT: 65 IN | TEMPERATURE: 98.3 F | OXYGEN SATURATION: 96 % | DIASTOLIC BLOOD PRESSURE: 60 MMHG | HEART RATE: 104 BPM | SYSTOLIC BLOOD PRESSURE: 144 MMHG | BODY MASS INDEX: 28.66 KG/M2

## 2021-05-15 VITALS
DIASTOLIC BLOOD PRESSURE: 64 MMHG | HEIGHT: 65 IN | TEMPERATURE: 97.7 F | BODY MASS INDEX: 28.16 KG/M2 | SYSTOLIC BLOOD PRESSURE: 150 MMHG | HEART RATE: 106 BPM | WEIGHT: 169 LBS | OXYGEN SATURATION: 97 %

## 2021-05-15 VITALS
BODY MASS INDEX: 25.49 KG/M2 | HEART RATE: 109 BPM | SYSTOLIC BLOOD PRESSURE: 100 MMHG | DIASTOLIC BLOOD PRESSURE: 66 MMHG | WEIGHT: 153 LBS | HEIGHT: 65 IN

## 2021-05-15 VITALS
WEIGHT: 157 LBS | HEIGHT: 65 IN | BODY MASS INDEX: 26.16 KG/M2 | DIASTOLIC BLOOD PRESSURE: 64 MMHG | RESPIRATION RATE: 16 BRPM | SYSTOLIC BLOOD PRESSURE: 140 MMHG | HEART RATE: 99 BPM | OXYGEN SATURATION: 98 %

## 2021-05-15 VITALS
SYSTOLIC BLOOD PRESSURE: 132 MMHG | HEART RATE: 102 BPM | HEIGHT: 65 IN | BODY MASS INDEX: 27.99 KG/M2 | DIASTOLIC BLOOD PRESSURE: 72 MMHG | WEIGHT: 168 LBS

## 2021-05-15 VITALS
BODY MASS INDEX: 27.74 KG/M2 | SYSTOLIC BLOOD PRESSURE: 120 MMHG | RESPIRATION RATE: 16 BRPM | OXYGEN SATURATION: 97 % | HEART RATE: 98 BPM | DIASTOLIC BLOOD PRESSURE: 78 MMHG | HEIGHT: 65 IN | TEMPERATURE: 97.8 F | WEIGHT: 166.5 LBS

## 2021-05-15 VITALS
BODY MASS INDEX: 26.66 KG/M2 | HEIGHT: 65 IN | SYSTOLIC BLOOD PRESSURE: 110 MMHG | WEIGHT: 160 LBS | OXYGEN SATURATION: 98 % | HEART RATE: 100 BPM | DIASTOLIC BLOOD PRESSURE: 72 MMHG | TEMPERATURE: 98.5 F

## 2021-05-15 VITALS
HEIGHT: 65 IN | BODY MASS INDEX: 26.16 KG/M2 | TEMPERATURE: 97.6 F | WEIGHT: 157 LBS | SYSTOLIC BLOOD PRESSURE: 126 MMHG | OXYGEN SATURATION: 95 % | DIASTOLIC BLOOD PRESSURE: 76 MMHG | HEART RATE: 96 BPM

## 2021-05-16 VITALS
DIASTOLIC BLOOD PRESSURE: 68 MMHG | TEMPERATURE: 97.5 F | SYSTOLIC BLOOD PRESSURE: 122 MMHG | HEIGHT: 65 IN | WEIGHT: 166.5 LBS | BODY MASS INDEX: 27.74 KG/M2 | HEART RATE: 100 BPM | OXYGEN SATURATION: 95 %

## 2021-05-16 VITALS
DIASTOLIC BLOOD PRESSURE: 72 MMHG | BODY MASS INDEX: 28.66 KG/M2 | SYSTOLIC BLOOD PRESSURE: 154 MMHG | WEIGHT: 172 LBS | HEIGHT: 65 IN | HEART RATE: 98 BPM

## 2021-05-16 VITALS
DIASTOLIC BLOOD PRESSURE: 78 MMHG | HEIGHT: 65 IN | HEART RATE: 90 BPM | SYSTOLIC BLOOD PRESSURE: 154 MMHG | BODY MASS INDEX: 29.66 KG/M2 | WEIGHT: 178 LBS

## 2021-05-16 VITALS
TEMPERATURE: 98 F | RESPIRATION RATE: 16 BRPM | DIASTOLIC BLOOD PRESSURE: 79 MMHG | SYSTOLIC BLOOD PRESSURE: 123 MMHG | BODY MASS INDEX: 30.05 KG/M2 | HEIGHT: 65 IN | WEIGHT: 180.37 LBS | OXYGEN SATURATION: 97 % | HEART RATE: 81 BPM

## 2021-05-16 VITALS
WEIGHT: 176 LBS | OXYGEN SATURATION: 98 % | DIASTOLIC BLOOD PRESSURE: 68 MMHG | SYSTOLIC BLOOD PRESSURE: 138 MMHG | RESPIRATION RATE: 12 BRPM | TEMPERATURE: 98.3 F | HEART RATE: 86 BPM

## 2021-05-16 VITALS
BODY MASS INDEX: 29.32 KG/M2 | SYSTOLIC BLOOD PRESSURE: 130 MMHG | HEIGHT: 65 IN | DIASTOLIC BLOOD PRESSURE: 76 MMHG | HEART RATE: 90 BPM | WEIGHT: 176 LBS

## 2021-05-16 VITALS
HEIGHT: 65 IN | OXYGEN SATURATION: 98 % | RESPIRATION RATE: 16 BRPM | DIASTOLIC BLOOD PRESSURE: 78 MMHG | HEART RATE: 69 BPM | BODY MASS INDEX: 30.22 KG/M2 | TEMPERATURE: 97.6 F | WEIGHT: 181.37 LBS | SYSTOLIC BLOOD PRESSURE: 128 MMHG

## 2021-05-16 VITALS
RESPIRATION RATE: 16 BRPM | HEART RATE: 104 BPM | TEMPERATURE: 97.5 F | BODY MASS INDEX: 27.86 KG/M2 | DIASTOLIC BLOOD PRESSURE: 62 MMHG | HEIGHT: 65 IN | WEIGHT: 167.25 LBS | SYSTOLIC BLOOD PRESSURE: 120 MMHG | OXYGEN SATURATION: 93 %

## 2021-05-16 VITALS
DIASTOLIC BLOOD PRESSURE: 86 MMHG | RESPIRATION RATE: 18 BRPM | OXYGEN SATURATION: 95 % | WEIGHT: 171 LBS | TEMPERATURE: 97.7 F | HEIGHT: 65 IN | SYSTOLIC BLOOD PRESSURE: 162 MMHG | HEART RATE: 101 BPM | BODY MASS INDEX: 28.49 KG/M2

## 2021-06-02 ENCOUNTER — TRANSCRIBE ORDERS (OUTPATIENT)
Dept: ADMINISTRATIVE | Facility: HOSPITAL | Age: 86
End: 2021-06-02

## 2021-06-02 DIAGNOSIS — Z80.1 FAMILY HISTORY OF LUNG CANCER: Primary | ICD-10-CM

## 2021-06-04 ENCOUNTER — HOSPITAL ENCOUNTER (OUTPATIENT)
Dept: RADIATION ONCOLOGY | Facility: HOSPITAL | Age: 86
Setting detail: RADIATION/ONCOLOGY SERIES
End: 2021-06-04

## 2021-06-05 NOTE — PROGRESS NOTES
Progress Note      Patient Name: Charlette Rai   Patient ID: [id number]   Sex: Female   YOB: 1935    Primary Care Provider: Paloma James MD   Referring Provider: Gabbi Jean-Baptiste PA-C    Visit Date: May 7, 2021    Provider: Tahmina Saavedra PA-C   Location: [facility]   Location Address: [address] [place] [address], [address]  [id number]   Location Phone: [phone]          Chief Complaint  · ER f/u       History Of Present Illness  Charlette Rai is a 86 year old /White female who presents for evaluation and treatment of:      ER f/u for L upper extremity pain  Pt went to ER 5/3/21 for upper extremity pain in L upper arm, elbow, forearm, wrist x 5 days. Hgb 10.2L, bg 109H, Cr 2.11H, troponin 0.02.  C spine XR showed moderate degenerative changes C4-5, C5-6, C6-7 and vascular calcifications in carotid bulbs bilaterally. Wrist XR showed mild-mod degenerative joint disease at 1st CMC joint and radiocarpal joint, mild diffuse soft tissue swelling, no acute bony abnormality. 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    Denies fall, injury, accidents   Denies neck pain  Arm pain has improved, now pain is only below elbow and into palm  Most of the pain is in the palm. She is wearing brace  She is having constant tinging in L hand.   She is able to move fingers normally  She is able to move wrist and elbow  She has noticed slight swelling   No sx in R hand.  She is sleeping with ice pack at night which helps her sleep  She has not noticed improvement with hydrocodone or prednisone.     Denies tingling or weakness in L leg, slurred speech, confusion, vision changes.       Past Medical History  Disease Name Date Onset Notes   Allergic rhinitis --  --    Anemia --  --    Anemia, Unspecified --  --    Arthritis --  --    Asthma --  --    Bladder disorder --  --    Cancer --  --    Cataract --  --     CHF (congestive heart failure) --  --    Chronic Obstructive Pulmonary Disease --  --    CKD (chronic kidney disease) stage 4, GFR 15-29 ml/min --  --    Congestive heart failure --  --    COPD (chronic obstructive pulmonary disease) --  --    Diabetes --  --    Diabetes mellitus type 2 with complications --  --    Essential hypertension --  --    Gastric Ulcer --  --    GERD (gastroesophageal reflux disease) --  --    Hyperlipemia --  --    Hyperlipidemia --  --    Hypertension --  --    Joint Pain, Multiple Sites --  --    Kidney Disease --  --    Leukocytopenia, unspecified --  --    Limb Swelling --  --    Lumbago --  --    Lumbar Spinal Stenosis --  --    Lung cancer --  CHEMO   Lung disease --  --    Migraine Headaches --  --    Osteopenia --  --    Reflux --  --    Shortness of Breath --  --    Thyroid disorder --  --    Thyroid nodule --  --    Ulcer --  --    Vascular disease, peripheral --  --    Vitamin D Deficiency --  --          Past Surgical History  Procedure Name Date Notes   *Other --  Fluid drained from heart   Abdomen --  --    Appendectomy --  --    Artificial Joints/Limbs --  --    Cardiac Catherization 1996 --    Cataract surgery 2003 --    Colonoscopy 2014 --    EGD 2016 2019 --    heart surgery --  --    Hysterectomy --  --    Joint Surgery --  --    Knee replacement, left 2016 --    Knee surgery 2016 Left   lung biopsy 2005 LOBECTOMY UPPER LUNG CANCER   Lung reduction surgery 2005 --          Medication List  Name Date Started Instructions   albuterol sulfate 2.5 mg /3 mL (0.083 %) inhalation solution for nebulization 12/09/2020 inhale 3 milliliters (2.5 mg) by nebulization route 4 times per day for 90 days DX COPD J44.9   amlodipine 5 mg oral tablet 12/07/2020 TAKE 1 TABLET ONCE DAILY for 90 days   Aspir-81 81 mg oral tablet,delayed release (DR/EC)  take 1 tablet (81 mg) by oral route once daily   atorvastatin 20 mg oral tablet 03/08/2021 TAKE 1 TABLET AT BEDTIME   Dexilant 60 mg oral  capsule,biphase delayed releas 12/07/2020 TAKE 1 CAPSULE DAILY for 90 days   fenofibrate 120 mg oral tablet 12/07/2020 TAKE 1 TABLET DAILY for 90 days   ferrous sulfate 325 mg (65 mg iron) oral tablet 03/08/2021 take 1 tablet by oral route weekly   furosemide 20 mg oral tablet 12/07/2020 take 1 tablet (20 mg) by oral route once daily for 90 days for 90 days   ipratropium bromide 0.02 % inhalation solution 12/09/2020 inhale 1.25 milliliters (250 mcg) via nebulizer by inhalation route 4 times per day for 90 days DX COPD J44.9   levocetirizine 5 mg oral tablet 12/07/2020 TAKE 1 TABLET DAILY   magnesium 200 mg oral tablet 12/07/2020 take 1 tablet by oral route daily for 90 days   Manual Wheelchair 08/30/2019 UAD DX M19.90; M54.5   metoprolol succinate 50 mg oral tablet extended release 24 hr 03/08/2021 take 2 tablets (100 mg) by oral route once daily for 90 days   Mobic 15 mg oral tablet 12/22/2020 take 1 tablet (15 mg) by oral route once daily   Nitrostat 0.4 mg sublingual tablet, sublingual  place 1 tablet (0.4 mg) by buccal route at the first sign of an attack; no more than 3 tabs are recommended within a 15 minute period.   Norco 5-325 mg oral tablet 03/08/2021 take 1 tablet by oral route 2 times a day as needed   One Touch Test Strips 0 09/02/2020 Dx: E11.9 use as directed to test blood sugar once a day as needed   OneTouch Ultra Blue Test Strip miscellaneous strip 11/25/2020 AS DIRECTED TO TEST BLOOD SUGAR ONCE A DAY AS NEEDED   Pepcid 40 mg oral tablet 12/07/2020 take 1 tablet (40 mg) by oral route once daily at bedtime for 90 days   prednisone 20 mg oral tablet  take 3 tablets (60 mg) by oral route once daily   Symbicort 160-4.5 mcg/actuation inhalation HFA aerosol inhaler 09/02/2020 inhale 2 puffs by inhalation route 2 times per day in the morning and evening   Tradjenta 5 mg oral tablet 03/08/2021 TAKE 1 TABLET ONCE DAILY   tramadol 50 mg oral tablet 01/12/2021 take 1 tablet by oral route 3 times a day    Trulicity 1.5 mg/0.5 mL subcutaneous pen injector 2020 INJECT 0.5ML (=1.5MG) SUBCUTANEOUSLY EVERY 7 DAYS IN THE ABDOMEN, THIGH, OR UPPER ARM ROTATING INJECTION SITES   Ventolin HFA 90 mcg/actuation inhalation HFA aerosol inhaler 2020 inhale 1 puff (90 mcg) by inhalation route every 6 hours as needed   Vitamin D2 1,250 mcg (50,000 unit) oral capsule 2020 take 1 capsule (50,000 unit) by oral route once weekly   Voltaren 1 % topical gel 2020 apply 2 grams to the affected area(s) by topical route 4 times per day         Allergy List  Allergen Name Date Reaction Notes   NO KNOWN DRUG ALLERGIES --  --  --        Allergies Reconciled  Family Medical History  Disease Name Relative/Age Notes   Cancer, Unspecified Brother/   Brother   Diabetes, unspecified type Brother/   Brother   - No Family History of Colorectal Cancer  --    Prostate cancer Brother/   2 Brothers   Blood Diseases Brother/   Brother   Family history of certain chronic disabling diseases; arthritis Father/  Mother/   Mother; Father         Reproductive History  Menstrual   Method of Birth Control: Other   Pregnancy Summary   Total Pregnancies: 0 Full Term: 0 Premature: 0   Ab Induced: 0 Ab Spontaneous: 0 Ectopics: 0   Multiples: 0 Livin         Social History  Finding Status Start/Stop Quantity Notes   Alcohol Use Never --/-- --  does not drink   Claustophobic Unknown --/-- --  yes   Homemaker --  --/-- --  --    Homemaker. --  --/-- --  --    lives with spouse --  --/-- --  --     --  --/-- --  --    . --  --/-- --  --    Recreational Drug Use Never --/-- --  no   Retired. --  --/-- --  --    Tobacco Former --/-- --  former smoker  former smoker, 1 packs per day, smoked 21-30 years  former smoker  former smoker  QUIT          Review of Systems  · Constitutional  o Denies  o : fever, fatigue, weight loss, weight gain  · Cardiovascular  o Denies  o : lower extremity edema, claudication, chest pressure,  "palpitations  · Respiratory  o Denies  o : shortness of breath, wheezing, frequent cough, hemoptysis, dyspnea on exertion  · Gastrointestinal  o Denies  o : nausea, vomiting, diarrhea, constipation, abdominal pain      Vitals  Date Time BP Position Site L\R Cuff Size HR RR TEMP (F) WT  HT  BMI kg/m2 BSA m2 O2 Sat FR L/min FiO2 HC       11/11/2020 12:13 /66 Sitting    95 - R   171lbs 0oz 5'  5\" 28.46 1.89       12/07/2020 01:53 /60 Sitting    88 - R 18 98.3 165lbs 16oz 5'  5\" 27.62 1.86 96 %      03/08/2021 01:22 /68 Sitting    101 - R  98.6 165lbs 16oz 5'  5\" 27.62 1.86 97 %      05/07/2021 01:16 /66 Sitting    102 - R 16 97.7 166lbs 6oz 5'  5\" 27.69 1.86 95 %            Physical Examination  · Constitutional  o Appearance  o : no acute distress, well-nourished  · Head and Face  o Head  o :   § Inspection  § : atraumatic, normocephalic  · Eyes  o Eyes  o : extraocular movements intact, no scleral icterus, no conjunctival injection  · Ears, Nose, Mouth and Throat  o Ears  o :   § External Ears  § : normal  o Nose  o :   § Intranasal Exam  § : nares patent  o Oral Cavity  o :   § Oral Mucosa  § : moist mucous membranes  · Respiratory  o Respiratory Effort  o : breathing comfortably, symmetric chest rise  o Auscultation of Lungs  o : clear to asculatation bilaterally, no wheezes, rales, or rhonchii  · Cardiovascular  o Heart  o :   § Auscultation of Heart  § : regular rate and rhythm, no murmurs, rubs, or gallops  o Peripheral Vascular System  o :   § Extremities  § : no edema  · Skin and Subcutaneous Tissue  o General Inspection  o : no lesions present, no areas of discoloration, skin turgor normal  · Neurologic  o Mental Status Examination  o :   § Orientation  § : grossly oriented to person, place and time  o Gait and Station  o :   § Gait Screening  § : normal gait  · Psychiatric  o General  o : normal mood and affect     MSK: NROM C spine. NROM L shoulder, elbow, wrist, fingers  No swelling " or redness in upper extremity   Normal cap refill and pulses.  Pt pointing to palm as location of sx.               Assessment  · Hypertension     401.9/I10  Discussed bp elevation at today's visit. Discussed risks of bp elevation including death, mi, stroke, ckd, blindness. Low salt diet, increase exercise. Discussed importance of medication compliance and not missing doses. Continue current meds at this time. We will monitor at follow up and adjust bp medications if necessary. To er if cp, palpitations, vision loss, unilateral weakness, altered mental status. Pt understands and agrees with plan.  · Left arm pain     729.5/M79.602  Reviewed ER Note and imaging. Discussed ddx. Will cont to tx conservatively with brace, ice. PT given handout of stretches to try and will refer to PT for eval. Pt will follow up with PCP if no improvement in 2-4 wk or sooner if sx worsen.   · Mononeuropathy     355.9/G58.9      Plan  · Orders  o ACO-39: Current medications updated and reviewed (, 1159F) - - 05/07/2021  o NEUROLOGY CONSULTATION. (NEURO) - 729.5/M79.602, 355.9/G58.9 - 05/07/2021  o PHYSICAL THERAPY CONSULTATION (PHYTH) - 729.5/M79.602, 355.9/G58.9, 401.9/I10 - 05/08/2021  · Medications  o Medications have been Reconciled  o Transition of Care or Provider Policy  · Instructions  o Patient was educated/instructed on their diagnosis, treatment and medications prior to discharge from the clinic today.  · Disposition  o Call or Return if symptoms worsen or persist.  o Follow up in 2 weeks  o Follow up with Dr. James            Electronically Signed by: Tahmina Saavedra PA-C -Author on May 8, 2021 07:31:24 AM

## 2021-06-05 NOTE — PROGRESS NOTES
"   Progress Note      Patient Name: Charlette Rai   Patient ID: [id number]   Sex: Female   YOB: 1935    Primary Care Provider: Paloma James MD   Referring Provider: Gabbi Jean-Baptiste PA-C    Visit Date: May 13, 2021    Provider: León Sanchez MD   Location: [facility]   Location Address: [address] [place] [address], [place], [place]  [id number]   Location Phone: [phone]          Chief Complaint     Carotid artery stenosis.  Renal failure.       History Of Present Illness  REFERRING CARE PROVIDER: Gabbi Jean-Baptiste PA-C   Charlette Rai is an 86 year old /White female with carotid artery stenosis, hyperlipidemia, hypertension, and chronic renal insufficiency who has had some dizziness sometimes occurring with balance issues. No chest pain problems. She has chronic shortness of breath issues.   PAST MEDICAL HISTORY: Carotid artery stenosis; Chronic renal failure; Dyslipidemia; Hypertension.   PSYCHOSOCIAL HISTORY: Previous tobacco use, but quit. Denies alcohol use.   CURRENT MEDICATIONS: Medications reviewed and are as documented.      ALLERGIES:  No known drug allergies.       Review of Systems  · Cardiovascular  o Admits  o : swelling (feet, ankles, hands), shortness of breath while walking or lying flat  o Denies  o : palpitations (fast, fluttering, or skipping beats), chest pain or angina pectoris   · Respiratory  o Denies  o : chronic or frequent cough      Vitals  Date Time BP Position Site L\R Cuff Size HR RR TEMP (F) WT  HT  BMI kg/m2 BSA m2 O2 Sat FR L/min FiO2 HC       05/13/2021 11:09 /74 Sitting    94 - R   161lbs 0oz 5'  5\" 26.79 1.83       05/13/2021 11:09 /68 Sitting    90 - R   161lbs 0oz 5'  5\" 26.79 1.83             Physical Examination  · Constitutional  o Appearance  o : Awake, alert, in no acute distress.   · Eyes  o Conjunctivae  o : Normal.  · Ears, Nose, Mouth and Throat  o Oral Cavity  o :   § Oral Mucosa  § : " Normal.  · Neck  o Inspection/Palpation  o : No JVD. Good carotid upstroke. No thyromegaly.  · Respiratory  o Respiratory  o : Good respiratory effort. Clear to percussion and auscultation.  · Cardiovascular  o Heart  o :   § Auscultation of Heart  § : Regular rate and rhythm. 2/6 systolic murmur.  o Peripheral Vascular System  o :   § Extremities  § : Good femoral and pedal pulses. No pedal edema.  · Gastrointestinal  o Abdominal Examination  o : Soft. No tenderness or masses felt. No hepatosplenomegaly. Abdominal aorta is not palpable.  · Labs  o Labs  o : Creatinine 2.45. Potassium 4.3. LDL cholesterol 67. HDL 61.          Assessment     ASSESSMENT & PLAN:    1.  Carotid artery stenosis, on chronic aspirin 81 mg once a day.  2.  Hyperlipidemia, on atorvastatin 20 mg daily.  LDL below 70.  No myalgia symptoms.  3.  Hypertension, mild.  Systolic elevation but with her intermittent dizzy spells and general age, will not titrate        up on higher.  Recommended keeping a home blood pressure log for review.             Electronically Signed by: Elo Leal-, Other -Author on May 15, 2021 11:24:38 AM  Electronically Co-signed by: León Sanchez MD -Reviewer on May 17, 2021 03:30:22 PM

## 2021-06-06 PROCEDURE — 77263 THER RADIOLOGY TX PLNG CPLX: CPT | Performed by: RADIOLOGY

## 2021-06-07 ENCOUNTER — TRANSCRIBE ORDERS (OUTPATIENT)
Dept: RADIATION ONCOLOGY | Facility: HOSPITAL | Age: 86
End: 2021-06-07

## 2021-06-07 DIAGNOSIS — C34.11 MALIGNANT NEOPLASM OF UPPER LOBE OF RIGHT LUNG (HCC): Primary | ICD-10-CM

## 2021-06-07 PROCEDURE — 77334 RADIATION TREATMENT AID(S): CPT | Performed by: RADIOLOGY

## 2021-06-14 ENCOUNTER — HOSPITAL ENCOUNTER (OUTPATIENT)
Dept: RADIATION ONCOLOGY | Facility: HOSPITAL | Age: 86
Setting detail: RADIATION/ONCOLOGY SERIES
Discharge: HOME | End: 2021-06-14

## 2021-06-14 ENCOUNTER — HOSPITAL ENCOUNTER (OUTPATIENT)
Dept: MRI IMAGING | Facility: HOSPITAL | Age: 86
Discharge: HOME | End: 2021-06-14
Admitting: RADIOLOGY

## 2021-06-14 VITALS
SYSTOLIC BLOOD PRESSURE: 138 MMHG | WEIGHT: 165.57 LBS | OXYGEN SATURATION: 95 % | TEMPERATURE: 98.3 F | RESPIRATION RATE: 16 BRPM | BODY MASS INDEX: 27.55 KG/M2 | HEART RATE: 103 BPM | DIASTOLIC BLOOD PRESSURE: 71 MMHG

## 2021-06-14 DIAGNOSIS — Z80.1 FAMILY HISTORY OF LUNG CANCER: ICD-10-CM

## 2021-06-14 DIAGNOSIS — C34.11 MALIGNANT NEOPLASM OF UPPER LOBE OF RIGHT LUNG (HCC): Primary | ICD-10-CM

## 2021-06-14 PROCEDURE — 70551 MRI BRAIN STEM W/O DYE: CPT

## 2021-06-14 NOTE — PROGRESS NOTES
Stereotactic Body Radiotherapy Note    06/14/2021        Treatment Site:  Energy: 6X  Dose: 1100  #Fx: 1  Start Date: 6/14/2021  Elapsed Days: 0      [Associated problem not found]  Cancer Staging  No matching staging information was found for the patient.     No current outpatient medications on file prior to encounter.     No current facility-administered medications on file prior to encounter.     Vitals:    06/14/21 1057   BP: 138/71   Pulse: 103   Resp: 16   Temp: 98.3 °F (36.8 °C)   SpO2: 95%     Pain Score    06/14/21 1057   PainSc: 0-No pain         Review of Systems   Constitutional: Negative for appetite change.   HENT: Negative for trouble swallowing.    Eyes: Positive for visual disturbance (wears glasses).   Respiratory: Positive for shortness of breath. Negative for cough.    Gastrointestinal: Negative for constipation, diarrhea and nausea.   Genitourinary: Negative for frequency and urgency.   Neurological: Positive for dizziness (with position changes). Negative for headache.              Comments: A stereotactic ablative radiation plan was devised to deliver the dose as indicated above. The patient was positioned on the treatment couch in a Vac-Fix immobilization device.  We then aligned with CBCT image guidance, which I personally checked. Once alignment was verified, we delivered the prescription dose using dynamic respiratory motion compensation under my guidance.    The Medical Physicist was also in attendance during patient set-up and treatment delivery.    The patient tolerated the procedure without incident.    Mica Escamilla RN  06/14/2021

## 2021-06-16 ENCOUNTER — HOSPITAL ENCOUNTER (OUTPATIENT)
Dept: RADIATION ONCOLOGY | Facility: HOSPITAL | Age: 86
Setting detail: RADIATION/ONCOLOGY SERIES
Discharge: HOME | End: 2021-06-16

## 2021-06-16 VITALS
WEIGHT: 164.9 LBS | OXYGEN SATURATION: 91 % | SYSTOLIC BLOOD PRESSURE: 149 MMHG | HEART RATE: 96 BPM | TEMPERATURE: 98.4 F | DIASTOLIC BLOOD PRESSURE: 67 MMHG | BODY MASS INDEX: 27.44 KG/M2 | RESPIRATION RATE: 16 BRPM

## 2021-06-16 DIAGNOSIS — C34.11 MALIGNANT NEOPLASM OF UPPER LOBE OF RIGHT LUNG (HCC): Primary | ICD-10-CM

## 2021-06-16 PROCEDURE — 77373 STRTCTC BDY RAD THER TX DLVR: CPT | Performed by: RADIOLOGY

## 2021-06-16 NOTE — PROGRESS NOTES
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  She is receiving radiation therapy to the RUL lung. She  has received 2200 cGy in 2 fractions out of a planned dose of 5500 cGy in 5 fractions.     Today on exam the patient is tolerating radiation therapy well and has no new disease or treatment-related complaints.                                             Review of Systems:   Review of Systems   Constitutional: Positive for fatigue.   HENT: Negative for sore throat and trouble swallowing.    Respiratory: Positive for cough and shortness of breath.    Gastrointestinal: Negative for constipation, diarrhea and nausea.   Genitourinary: Negative for frequency and urgency.   Neurological: Negative for headaches.       Vitals:     Vitals:    06/16/21 1148   BP: 149/67   Pulse: 96   Resp: 16   Temp: 98.4 °F (36.9 °C)   SpO2: 91%       Weight:   Wt Readings from Last 3 Encounters:   06/16/21 74.8 kg (164 lb 14.5 oz)   06/14/21 75.1 kg (165 lb 9.1 oz)   05/13/21 73 kg (161 lb)      Pain:    Pain Score    06/16/21 1148   PainSc: 0-No pain         Physical Exam:  Gen: WD/WN; NAD  HEENT: MMM  Trachea: midline  Chest: symmetric  Resp: normal respiratory effort  Extr: warm, well-perfused  Neuro: awake and alert; no aphasia or neglect    Plan: I have reviewed treatment setup notes, checked and approved the daily guidance images.  I reviewed dose delivery, treatment parameters and deemed them appropriate. We plan to continue radiation therapy as prescribed.          Radiation Oncology    Electronically signed by Regis Mckeon MD 6/16/2021  17:14 EDT

## 2021-06-16 NOTE — PROGRESS NOTES
Stereotactic Body Radiotherapy Note    06/16/2021        Treatment Site:  Energy: 6X  Dose: 2200  #Fx: 2  Start Date: 6/14/2021  Elapsed Days: 2        No current outpatient medications on file prior to encounter.     No current facility-administered medications on file prior to encounter.     Vitals:    06/16/21 1148   BP: 149/67   Pulse: 96   Resp: 16   Temp: 98.4 °F (36.9 °C)   SpO2: 91%     Pain Score    06/16/21 1148   PainSc: 0-No pain              Comments: A stereotactic ablative radiation plan was devised to deliver the dose as indicated above. The patient was positioned on the treatment couch in a Vac-Fix immobilization device.  We then aligned with CBCT image guidance, which I personally checked. Once alignment was verified, we delivered the prescription dose using dynamic respiratory motion compensation under my guidance.    The Medical Physicist was also in attendance during patient set-up and treatment delivery.    The patient tolerated the procedure without incident.    Regis Mckeon MD  06/16/2021

## 2021-06-18 ENCOUNTER — HOSPITAL ENCOUNTER (OUTPATIENT)
Dept: RADIATION ONCOLOGY | Facility: HOSPITAL | Age: 86
Setting detail: RADIATION/ONCOLOGY SERIES
Discharge: HOME | End: 2021-06-18

## 2021-06-18 VITALS
RESPIRATION RATE: 18 BRPM | DIASTOLIC BLOOD PRESSURE: 88 MMHG | TEMPERATURE: 97.6 F | SYSTOLIC BLOOD PRESSURE: 143 MMHG | OXYGEN SATURATION: 95 % | HEART RATE: 104 BPM

## 2021-06-18 DIAGNOSIS — C34.11 MALIGNANT NEOPLASM OF UPPER LOBE OF RIGHT LUNG (HCC): Primary | ICD-10-CM

## 2021-06-18 PROCEDURE — 77373 STRTCTC BDY RAD THER TX DLVR: CPT | Performed by: RADIOLOGY

## 2021-06-18 NOTE — PROGRESS NOTES
Stereotactic Body Radiotherapy Note    06/18/2021        Treatment Site: right upper lobe  Energy: 6X  Dose: 3300  #Fx: 3  Start Date: 6/18/2021  Elapsed Days: 4        No current outpatient medications on file prior to encounter.     No current facility-administered medications on file prior to encounter.     Vitals:    06/18/21 1144   BP: 143/88   Pulse: 104   Resp: 18   Temp: 97.6 °F (36.4 °C)   SpO2: 95%     Pain Score    06/18/21 1144   PainSc: 0-No pain         Review of Systems   Constitutional: Positive for fatigue.   Respiratory: Positive for cough and shortness of breath.    Gastrointestinal: Positive for diarrhea. Negative for constipation and nausea.   Genitourinary: Negative for frequency and urgency.   Neurological: Positive for dizziness. Negative for headache.            Physical Exam   Gen:  WD/WN   Lungs: CTAB  Heart: +S1, +S2; tachycardic    Comments: A stereotactic ablative radiation plan was devised to deliver the dose as indicated above. The patient was positioned on the treatment couch in a Vac-Fix immobilization device.  We then aligned with CBCT image guidance, which I personally checked. Once alignment was verified, we delivered the prescription dose using dynamic respiratory motion compensation under my guidance.    The Medical Physicist was also in attendance during patient set-up and treatment delivery.    The patient tolerated the procedure without incident.    Mica Escamilla RN  06/18/2021

## 2021-06-18 NOTE — PROGRESS NOTES
On Treatment Visit       Patient: Charlette Rai   YOB: 1935   Medical Record Number: [id number]     Date of Visit  June 18, 2021   Primary Diagnosis:No primary diagnosis found.  Cancer Staging: Cancer Staging  No matching staging information was found for the patient.       was seen today for an on treatment visit.  She is receiving radiation therapy to the {[facility] body location:[id number]}. She  has received *** cGy in *** fractions out of a planned dose of *** cGy in *** fractions. She is currently receiving concurrent *** chemotherapy per  ***.     Today on exam the patient is tolerating radiation therapy well and has no new disease or treatment-related complaints. ***                                          Review of Systems:   Review of Systems    Vitals:     Vitals:    06/18/21 1144   BP: 143/88   Pulse: 104   Resp: 18   Temp: 97.6 °F (36.4 °C)   SpO2: 95%       Weight:   Wt Readings from Last 3 Encounters:   06/16/21 74.8 kg (164 lb 14.5 oz)   06/14/21 75.1 kg (165 lb 9.1 oz)   05/13/21 73 kg (161 lb)      Pain:    Pain Score    06/18/21 1144   PainSc: 0-No pain         Physical Exam:  Gen: WD/WN; NAD  HEENT: MMM  Trachea: midline  Chest: symmetric  Resp: normal respiratory effort  Extr: warm, well-perfused  Neuro: awake and alert; no aphasia or neglect    Plan: I have reviewed treatment setup notes, checked and approved the daily guidance images.  I reviewed dose delivery, treatment parameters and deemed them appropriate. We plan to continue radiation therapy as prescribed.          Radiation Oncology    Electronically signed by Mica Escamilla RN 6/18/2021  11:44 EDT

## 2021-06-21 ENCOUNTER — DOCUMENTATION (OUTPATIENT)
Dept: RADIATION ONCOLOGY | Facility: HOSPITAL | Age: 86
End: 2021-06-21

## 2021-06-21 ENCOUNTER — HOSPITAL ENCOUNTER (OUTPATIENT)
Dept: RADIATION ONCOLOGY | Facility: HOSPITAL | Age: 86
Setting detail: RADIATION/ONCOLOGY SERIES
Discharge: HOME | End: 2021-06-21

## 2021-06-21 VITALS
WEIGHT: 165.12 LBS | BODY MASS INDEX: 27.48 KG/M2 | DIASTOLIC BLOOD PRESSURE: 82 MMHG | HEART RATE: 100 BPM | OXYGEN SATURATION: 93 % | SYSTOLIC BLOOD PRESSURE: 134 MMHG | TEMPERATURE: 98.6 F | RESPIRATION RATE: 18 BRPM

## 2021-06-21 DIAGNOSIS — C34.11 MALIGNANT NEOPLASM OF UPPER LOBE OF RIGHT LUNG (HCC): Primary | ICD-10-CM

## 2021-06-21 PROCEDURE — 77373 STRTCTC BDY RAD THER TX DLVR: CPT | Performed by: RADIOLOGY

## 2021-06-21 NOTE — PROGRESS NOTES
Diagnosis: Lung cancer    Reason for referral: Rounding    Comments: Met with pt and spouse in rad onc during tx. After today, pt to receive one more SBRT treatment on 6/23. Pt resides in [facility] with spouse and has two daughters in the area for support. Pt has [employer] Medicare Advantage plan. No needs identified at this time. Provided my business card, encouraging SW support remains available. Pt expressed gratitude.    Services/Referrals Provided: No needs identified at this time.

## 2021-06-23 ENCOUNTER — HOSPITAL ENCOUNTER (OUTPATIENT)
Dept: RADIATION ONCOLOGY | Facility: HOSPITAL | Age: 86
Setting detail: RADIATION/ONCOLOGY SERIES
Discharge: HOME | End: 2021-06-23
Payer: MEDICARE

## 2021-06-23 VITALS
WEIGHT: 166.89 LBS | TEMPERATURE: 97.4 F | BODY MASS INDEX: 27.77 KG/M2 | RESPIRATION RATE: 18 BRPM | OXYGEN SATURATION: 94 % | SYSTOLIC BLOOD PRESSURE: 152 MMHG | HEART RATE: 93 BPM | DIASTOLIC BLOOD PRESSURE: 61 MMHG

## 2021-06-23 DIAGNOSIS — C34.11 MALIGNANT NEOPLASM OF UPPER LOBE OF RIGHT LUNG: Primary | ICD-10-CM

## 2021-06-23 PROCEDURE — 77336 RADIATION PHYSICS CONSULT: CPT | Performed by: RADIOLOGY

## 2021-06-23 RX ORDER — NITROGLYCERIN 0.4 MG/1
0.4 TABLET SUBLINGUAL
Status: ON HOLD | COMMUNITY
End: 2025-01-27

## 2021-06-23 RX ORDER — FAMOTIDINE 40 MG/1
1 TABLET, FILM COATED ORAL DAILY
COMMUNITY
Start: 2021-04-03 | End: 2021-07-21

## 2021-06-23 RX ORDER — FERROUS SULFATE 325(65) MG
1 TABLET ORAL 2 TIMES DAILY
Status: ON HOLD | COMMUNITY
Start: 2021-03-08 | End: 2023-10-29

## 2021-06-23 RX ORDER — DEXLANSOPRAZOLE 60 MG/1
1 CAPSULE, DELAYED RELEASE ORAL DAILY
COMMUNITY
Start: 2021-04-03 | End: 2021-06-28

## 2021-06-23 RX ORDER — AMLODIPINE BESYLATE 2.5 MG/1
2.5 TABLET ORAL
COMMUNITY
End: 2021-08-02 | Stop reason: ALTCHOICE

## 2021-06-23 RX ORDER — FENOFIBRATE 120 MG/1
1 TABLET ORAL DAILY
COMMUNITY
Start: 2021-04-03 | End: 2021-07-21

## 2021-06-23 RX ORDER — CILOSTAZOL 100 MG/1
1 TABLET ORAL 2 TIMES DAILY
COMMUNITY
Start: 2021-04-17 | End: 2024-03-09 | Stop reason: HOSPADM

## 2021-06-23 RX ORDER — LEVOCETIRIZINE DIHYDROCHLORIDE 5 MG/1
1 TABLET, FILM COATED ORAL DAILY
COMMUNITY
Start: 2021-05-03 | End: 2021-07-21

## 2021-06-23 RX ORDER — ALBUTEROL SULFATE 90 UG/1
2 INHALANT RESPIRATORY (INHALATION) EVERY 4 HOURS PRN
COMMUNITY
End: 2024-09-12 | Stop reason: SDUPTHER

## 2021-06-23 RX ORDER — METOPROLOL SUCCINATE 50 MG/1
2 TABLET, EXTENDED RELEASE ORAL
COMMUNITY
Start: 2021-05-20 | End: 2021-09-07

## 2021-06-23 RX ORDER — BUDESONIDE AND FORMOTEROL FUMARATE DIHYDRATE 160; 4.5 UG/1; UG/1
2 AEROSOL RESPIRATORY (INHALATION) 2 TIMES DAILY PRN
COMMUNITY
Start: 2021-05-17 | End: 2024-03-13

## 2021-06-23 RX ORDER — ERGOCALCIFEROL 1.25 MG/1
50000 CAPSULE, LIQUID FILLED ORAL WEEKLY
COMMUNITY
End: 2022-04-14

## 2021-06-23 RX ORDER — ATORVASTATIN CALCIUM 20 MG/1
40 TABLET, FILM COATED ORAL NIGHTLY
COMMUNITY
End: 2022-12-29 | Stop reason: SDUPTHER

## 2021-06-23 RX ORDER — CALCIUM POLYCARBOPHIL 625 MG
625 TABLET ORAL DAILY
COMMUNITY
End: 2021-12-08 | Stop reason: ALTCHOICE

## 2021-06-23 RX ORDER — LOSARTAN POTASSIUM 25 MG/1
1 TABLET ORAL DAILY
COMMUNITY
End: 2022-12-13 | Stop reason: ALTCHOICE

## 2021-06-23 RX ORDER — FUROSEMIDE 20 MG/1
1 TABLET ORAL DAILY
COMMUNITY
Start: 2021-04-03 | End: 2021-07-21

## 2021-06-23 NOTE — PROGRESS NOTES
On Treatment Visit       Patient: Charlette Rai   YOB: 1935   Medical Record Number: [id number]     Date of Visit  June 23, 2021   Primary Diagnosis:No primary diagnosis found.  Cancer Staging: Cancer Staging  No matching staging information was found for the patient.       was seen today for an on treatment visit.  She is receiving radiation therapy to the {[facility] body location:10789}. She  has received *** cGy in *** fractions out of a planned dose of *** cGy in *** fractions. She is currently receiving concurrent *** chemotherapy per  ***.     Today on exam the patient is tolerating radiation therapy well and has no new disease or treatment-related complaints. ***                                          Review of Systems:   Review of Systems   Constitutional: Positive for fatigue. Negative for appetite change.   HENT: Positive for trouble swallowing. Negative for sinus pressure and sinus pain.    Respiratory: Positive for shortness of breath. Negative for cough.    Cardiovascular: Positive for leg swelling (ongoing, varies in severity).   Gastrointestinal: Negative for constipation and diarrhea.   Musculoskeletal: Positive for back pain.   Neurological: Positive for dizziness (occurs often, ongoing). Negative for headaches.   Psychiatric/Behavioral: Negative for suicidal ideas. The patient is not nervous/anxious.        Vitals:     Vitals:    06/23/21 1120   BP: 152/61   Pulse: 93   Resp: 18   Temp: 97.4 °F (36.3 °C)   SpO2: 94%       Weight:   Wt Readings from Last 3 Encounters:   06/23/21 75.7 kg (166 lb 14.2 oz)   06/21/21 74.9 kg (165 lb 2 oz)   06/16/21 74.8 kg (164 lb 14.5 oz)      Pain:    Pain Score    06/23/21 1120   PainSc:   7   PainLoc: Back         Physical Exam:  Gen: WD/WN; NAD  HEENT: MMM  Trachea: midline  Chest: symmetric  Resp: normal respiratory effort  Extr: warm, well-perfused  Neuro: awake and alert; no aphasia or neglect    Plan: I have reviewed treatment setup  notes, checked and approved the daily guidance images.  I reviewed dose delivery, treatment parameters and deemed them appropriate. We plan to continue radiation therapy as prescribed.          Radiation Oncology    Electronically signed by Yadira Sarmiento RN 6/23/2021  11:34 EDT

## 2021-06-23 NOTE — PROGRESS NOTES
Inpatient Consult       Patient: Charlette Rai   YOB: 1935   Medical Record Number: [id number]      Date of Consult:  2021  Primary Diagnosis:  *** .  ICD 10 Code: ***                                              History of Present Illness:***    Review of Systems: *** The remainder of {HIS/HER:} review of systems is otherwise negative.    Past Medical History:   Diagnosis Date   • Chronic kidney disease     stage 3   • COPD (chronic obstructive pulmonary disease) (CMS/HCC)    • Diabetes mellitus (CMS/HCC)    • Disease of thyroid gland    • GERD (gastroesophageal reflux disease)    • Hypertension    • Lung cancer (CMS/HCC)         Past Surgical History:   Procedure Laterality Date   • APPENDECTOMY     • CARDIAC SURGERY     • CATARACT EXTRACTION, BILATERAL     • FEMORAL ARTERY STENT Bilateral    • HYSTERECTOMY        Family History   Problem Relation Age of Onset   • Cancer Brother    • Cancer Brother    • Cancer Brother         Social History     Tobacco Use   • Smoking status: Former Smoker     Packs/day: 1.00     Years: 30.00     Pack years: 30.00     Types: Cigarettes     Quit date: 2004     Years since quittin.0   Substance Use Topics   • Alcohol use: Never   • Drug use: Never        Patient has no known allergies.   No current outpatient medications on file.   Pain:   Pain Score    21 1120   PainSc:   7   PainLoc: Back       ECOG Performance Status:   Quality of Life:  {QOL Assessment:05861}  Performance Status:  {findings; ecog performance status:74628}      ACP: Advanced Care Planning was discussed. {Blank multiple:23639}    Physical Examination:  Vitals:   [unfilled]    Height:    Weight:       21  1120   Weight: 75.7 kg (166 lb 14.2 oz)        Constitutional: The patient is a well-developed, well-nourished *** female  in no acute distress.  Alert and oriented ×3.  Eyes: PERRLA.  EOMI.  ENMT:  Ears and nose WNL.  No lesions noted in the oral cavity or  oropharynx.  Lymphatics: No cervical, supraclavicular, axillary, or inguinal lymphadenopathy is palpated.  CV: Regular rate and rhythm.  No murmurs, rubs, or gallops are appreciated.  Respiratory: Lungs clear to auscultation.  Breath sounds equal bilaterally.  GI: Abdomen soft, nontender, nondistended, with no hepatosplenomegaly or masses palpated.  Extremities: No clubbing, cyanosis, or edema.  Neurologic: Cranial nerves II through XII are grossly intact, with no focal neurological deficits noted on exam.  Psychiatric: Alert and oriented x3. Normal affect, with no anxiety or depression noted.    Radiographs :***  Pathology: ***  Labs:   WBC   Date Value Ref Range Status   05/03/2021 5.27 4.80 - 10.80 10*3/uL Final     Hemoglobin   Date Value Ref Range Status   05/03/2021 10.2 (L) 12.0 - 16.0 g/dL Final     Hematocrit   Date Value Ref Range Status   05/03/2021 31.4 (L) 37.0 - 47.0 % Final     Platelets   Date Value Ref Range Status   05/03/2021 314 130 - 400 10*3/uL Final    No results found for: CEA  PFTs: FEV1 of *** (***% of predicted), FVC of *** (***% of predicted), FEV1/FVC ratio ***% (***% of predicted).      ASSESSMENT/PLAN: ***    Sincerely,

## 2021-06-23 NOTE — PROGRESS NOTES
Stereotactic Body Radiotherapy Note    06/23/2021    [unfilled]    Treatment Site:  Energy: 6X  Dose: 5500/5500 cGy  #Fx: 5/5  Start Date:6/14/21  Elapsed Days: 9     Diagnosis Plan   1. Malignant neoplasm of upper lobe of right lung (CMS/HCC)         Cancer Staging  No matching staging information was found for the patient.     No current outpatient medications on file prior to encounter.     No current facility-administered medications on file prior to encounter.     Vitals:    06/23/21 1120   BP: 152/61   Pulse: 93   Resp: 18   Temp: 97.4 °F (36.3 °C)   SpO2: 94%     Pain Score    06/23/21 1120   PainSc:   7   PainLoc: Back       Oncology/Hematology History   Malignant neoplasm of upper lobe of right lung (CMS/HCC)   6/7/2021 Initial Diagnosis    Malignant neoplasm of upper lobe of right lung (CMS/HCC)     6/7/2021 -  Radiation    RADIATION THERAPY Treatment Details (Noted on 6/7/2021)  Site: Right Lung - Upper lobe  Technique: SBRT  Goal: Curative  Planned Treatment Start Date: No planned start date specified     6/7/2021 -  Radiation    RADIATION THERAPY Treatment Details (Noted on 6/7/2021)  Site: Right Lung - Upper lobe  Technique: SBRT  Goal: No goal specified  Planned Treatment Start Date: No planned start date specified     6/7/2021 -  Radiation    RADIATION THERAPY Treatment Details (Noted on 6/7/2021)  Site: Right Lung - Upper lobe  Technique: SBRT  Goal: Curative  Planned Treatment Start Date: No planned start date specified     6/7/2021 -  Radiation    RADIATION THERAPY Treatment Details (Noted on 6/7/2021)  Site: Right Lung - Upper lobe  Technique: SBRT  Goal: No goal specified  Planned Treatment Start Date: No planned start date specified         Review of Systems   Constitutional: Positive for fatigue. Negative for appetite change.   HENT: Negative for sinus pressure, sore throat and trouble swallowing.    Respiratory: Positive for shortness of breath. Negative for cough.         Uses  supplemental O2 at night.   Cardiovascular: Positive for leg swelling (ongoing, varies day to day in severity).   Gastrointestinal: Negative for constipation and diarrhea.   Musculoskeletal: Positive for back pain (ongoing).   Neurological: Positive for dizziness (occasional). Negative for headache.   Psychiatric/Behavioral: Negative for suicidal ideas. The patient is not nervous/anxious.             Physical Exam  Constitutional:       Comments: NAD   Cardiovascular:      Rate and Rhythm: Normal rate and regular rhythm.   Pulmonary:      Effort: Pulmonary effort is normal.      Breath sounds: Normal breath sounds. No wheezing or rhonchi.   Neurological:      Mental Status: She is alert.         Comments: A stereotactic ablative radiation plan was devised to deliver the dose as indicated above. The patient was positioned on the treatment couch in a Vac-Fix immobilization device.  We then aligned with CBCT image guidance, which I personally checked. Once alignment was verified, we delivered the prescription dose using dynamic respiratory motion compensation under my guidance.    The Medical Physicist was also in attendance during patient set-up and treatment delivery.    The patient tolerated the procedure without incident.    Ruth Arellano MD  06/23/2021

## 2021-06-28 RX ORDER — DEXLANSOPRAZOLE 60 MG/1
CAPSULE, DELAYED RELEASE ORAL
Qty: 90 CAPSULE | Refills: 1 | Status: SHIPPED | OUTPATIENT
Start: 2021-06-28 | End: 2021-12-13

## 2021-07-15 ENCOUNTER — TRANSCRIBE ORDERS (OUTPATIENT)
Dept: ADMINISTRATIVE | Facility: HOSPITAL | Age: 86
End: 2021-07-15

## 2021-07-15 VITALS
HEIGHT: 65 IN | HEART RATE: 102 BPM | TEMPERATURE: 97.7 F | WEIGHT: 166.37 LBS | DIASTOLIC BLOOD PRESSURE: 66 MMHG | SYSTOLIC BLOOD PRESSURE: 150 MMHG | OXYGEN SATURATION: 95 % | RESPIRATION RATE: 16 BRPM | BODY MASS INDEX: 27.72 KG/M2

## 2021-07-15 VITALS
HEART RATE: 94 BPM | WEIGHT: 161 LBS | BODY MASS INDEX: 26.82 KG/M2 | HEIGHT: 65 IN | SYSTOLIC BLOOD PRESSURE: 170 MMHG | DIASTOLIC BLOOD PRESSURE: 74 MMHG

## 2021-07-15 DIAGNOSIS — Z92.89 HISTORY OF MAMMOGRAPHY, SCREENING: Primary | ICD-10-CM

## 2021-07-21 RX ORDER — FENOFIBRATE 120 MG/1
120 TABLET ORAL DAILY
Qty: 90 TABLET | Refills: 1 | Status: SHIPPED | OUTPATIENT
Start: 2021-07-21 | End: 2022-01-10 | Stop reason: SDUPTHER

## 2021-07-21 RX ORDER — FAMOTIDINE 40 MG/1
40 TABLET, FILM COATED ORAL DAILY
Qty: 90 TABLET | Refills: 1 | Status: SHIPPED | OUTPATIENT
Start: 2021-07-21 | End: 2021-08-02 | Stop reason: SDUPTHER

## 2021-07-21 RX ORDER — AMLODIPINE BESYLATE 5 MG/1
5 TABLET ORAL DAILY
Qty: 90 TABLET | Refills: 1 | Status: SHIPPED | OUTPATIENT
Start: 2021-07-21 | End: 2021-09-30 | Stop reason: SDUPTHER

## 2021-07-21 RX ORDER — LEVOCETIRIZINE DIHYDROCHLORIDE 5 MG/1
5 TABLET, FILM COATED ORAL DAILY
Qty: 90 TABLET | Refills: 1 | Status: SHIPPED | OUTPATIENT
Start: 2021-07-21 | End: 2022-01-10 | Stop reason: SDUPTHER

## 2021-07-21 RX ORDER — FUROSEMIDE 20 MG/1
20 TABLET ORAL DAILY
Qty: 90 TABLET | Refills: 1 | Status: SHIPPED | OUTPATIENT
Start: 2021-07-21 | End: 2021-12-06

## 2021-07-22 ENCOUNTER — TRANSCRIBE ORDERS (OUTPATIENT)
Dept: LAB | Facility: HOSPITAL | Age: 86
End: 2021-07-22

## 2021-07-22 ENCOUNTER — LAB (OUTPATIENT)
Dept: LAB | Facility: HOSPITAL | Age: 86
End: 2021-07-22

## 2021-07-22 DIAGNOSIS — N18.4 STAGE 4 CHRONIC KIDNEY DISEASE (HCC): ICD-10-CM

## 2021-07-22 DIAGNOSIS — N18.4 STAGE 4 CHRONIC KIDNEY DISEASE (HCC): Primary | ICD-10-CM

## 2021-07-22 LAB
ALBUMIN SERPL-MCNC: 4 G/DL (ref 3.5–5.2)
ANION GAP SERPL CALCULATED.3IONS-SCNC: 10.3 MMOL/L (ref 5–15)
BILIRUB UR QL STRIP: NEGATIVE
BUN SERPL-MCNC: 26 MG/DL (ref 8–23)
BUN/CREAT SERPL: 11.7 (ref 7–25)
CALCIUM SPEC-SCNC: 9.5 MG/DL (ref 8.6–10.5)
CHLORIDE SERPL-SCNC: 102 MMOL/L (ref 98–107)
CLARITY UR: CLEAR
CO2 SERPL-SCNC: 27.7 MMOL/L (ref 22–29)
COLOR UR: YELLOW
CREAT SERPL-MCNC: 2.22 MG/DL (ref 0.57–1)
CREAT UR-MCNC: 41.6 MG/DL
DEPRECATED RDW RBC AUTO: 44.2 FL (ref 37–54)
ERYTHROCYTE [DISTWIDTH] IN BLOOD BY AUTOMATED COUNT: 12.3 % (ref 12.3–15.4)
GFR SERPL CREATININE-BSD FRML MDRD: 21 ML/MIN/1.73
GLUCOSE SERPL-MCNC: 111 MG/DL (ref 65–99)
GLUCOSE UR STRIP-MCNC: NEGATIVE MG/DL
HCT VFR BLD AUTO: 30.5 % (ref 34–46.6)
HGB BLD-MCNC: 9.9 G/DL (ref 12–15.9)
HGB UR QL STRIP.AUTO: NEGATIVE
KETONES UR QL STRIP: NEGATIVE
LEUKOCYTE ESTERASE UR QL STRIP.AUTO: NEGATIVE
MCH RBC QN AUTO: 31.8 PG (ref 26.6–33)
MCHC RBC AUTO-ENTMCNC: 32.5 G/DL (ref 31.5–35.7)
MCV RBC AUTO: 98.1 FL (ref 79–97)
NITRITE UR QL STRIP: NEGATIVE
PH UR STRIP.AUTO: 7.5 [PH] (ref 5–8)
PHOSPHATE SERPL-MCNC: 4.3 MG/DL (ref 2.5–4.5)
PLATELET # BLD AUTO: 335 10*3/MM3 (ref 140–450)
PMV BLD AUTO: 9.9 FL (ref 6–12)
POTASSIUM SERPL-SCNC: 5.2 MMOL/L (ref 3.5–5.2)
PROT UR QL STRIP: NEGATIVE
PROT UR-MCNC: 4.5 MG/DL
PROT/CREAT UR: 0.1 MG/G{CREAT}
RBC # BLD AUTO: 3.11 10*6/MM3 (ref 3.77–5.28)
SODIUM SERPL-SCNC: 140 MMOL/L (ref 136–145)
SP GR UR STRIP: 1.01 (ref 1–1.03)
UROBILINOGEN UR QL STRIP: NORMAL
WBC # BLD AUTO: 4.72 10*3/MM3 (ref 3.4–10.8)

## 2021-07-22 PROCEDURE — 36415 COLL VENOUS BLD VENIPUNCTURE: CPT

## 2021-07-22 PROCEDURE — 84156 ASSAY OF PROTEIN URINE: CPT

## 2021-07-22 PROCEDURE — 82570 ASSAY OF URINE CREATININE: CPT

## 2021-07-22 PROCEDURE — 81003 URINALYSIS AUTO W/O SCOPE: CPT

## 2021-07-22 PROCEDURE — 85027 COMPLETE CBC AUTOMATED: CPT

## 2021-07-22 PROCEDURE — 80069 RENAL FUNCTION PANEL: CPT

## 2021-08-02 ENCOUNTER — OFFICE VISIT (OUTPATIENT)
Dept: INTERNAL MEDICINE | Facility: CLINIC | Age: 86
End: 2021-08-02

## 2021-08-02 VITALS
DIASTOLIC BLOOD PRESSURE: 70 MMHG | HEART RATE: 74 BPM | HEIGHT: 65 IN | WEIGHT: 166.2 LBS | SYSTOLIC BLOOD PRESSURE: 146 MMHG | BODY MASS INDEX: 27.69 KG/M2 | TEMPERATURE: 98.1 F | OXYGEN SATURATION: 97 %

## 2021-08-02 DIAGNOSIS — E78.2 MIXED HYPERLIPIDEMIA: ICD-10-CM

## 2021-08-02 DIAGNOSIS — I10 ESSENTIAL HYPERTENSION: ICD-10-CM

## 2021-08-02 DIAGNOSIS — N18.4 STAGE 4 CHRONIC KIDNEY DISEASE (HCC): ICD-10-CM

## 2021-08-02 DIAGNOSIS — E11.9 TYPE 2 DIABETES MELLITUS WITHOUT COMPLICATION, WITHOUT LONG-TERM CURRENT USE OF INSULIN (HCC): Primary | ICD-10-CM

## 2021-08-02 LAB
ALBUMIN SERPL-MCNC: 4.4 G/DL (ref 3.5–5.2)
ALBUMIN/GLOB SERPL: 1.5 G/DL
ALP SERPL-CCNC: 79 U/L (ref 39–117)
ALT SERPL W P-5'-P-CCNC: 12 U/L (ref 1–33)
ANION GAP SERPL CALCULATED.3IONS-SCNC: 10.5 MMOL/L (ref 5–15)
AST SERPL-CCNC: 25 U/L (ref 1–32)
BILIRUB SERPL-MCNC: 0.3 MG/DL (ref 0–1.2)
BUN SERPL-MCNC: 31 MG/DL (ref 8–23)
BUN/CREAT SERPL: 14.8 (ref 7–25)
CALCIUM SPEC-SCNC: 9.7 MG/DL (ref 8.6–10.5)
CHLORIDE SERPL-SCNC: 99 MMOL/L (ref 98–107)
CHOLEST SERPL-MCNC: 164 MG/DL (ref 0–200)
CO2 SERPL-SCNC: 30.5 MMOL/L (ref 22–29)
CREAT SERPL-MCNC: 2.1 MG/DL (ref 0.57–1)
GFR SERPL CREATININE-BSD FRML MDRD: 22 ML/MIN/1.73
GLOBULIN UR ELPH-MCNC: 3 GM/DL
GLUCOSE SERPL-MCNC: 116 MG/DL (ref 65–99)
HBA1C MFR BLD: 6 % (ref 4.8–5.6)
HDLC SERPL-MCNC: 48 MG/DL (ref 40–60)
LDLC SERPL CALC-MCNC: 85 MG/DL (ref 0–100)
LDLC/HDLC SERPL: 1.66 {RATIO}
POTASSIUM SERPL-SCNC: 4.6 MMOL/L (ref 3.5–5.2)
PROT SERPL-MCNC: 7.4 G/DL (ref 6–8.5)
SODIUM SERPL-SCNC: 140 MMOL/L (ref 136–145)
TRIGL SERPL-MCNC: 182 MG/DL (ref 0–150)
VLDLC SERPL-MCNC: 31 MG/DL (ref 5–40)

## 2021-08-02 PROCEDURE — 80061 LIPID PANEL: CPT | Performed by: INTERNAL MEDICINE

## 2021-08-02 PROCEDURE — 36415 COLL VENOUS BLD VENIPUNCTURE: CPT | Performed by: INTERNAL MEDICINE

## 2021-08-02 PROCEDURE — 99214 OFFICE O/P EST MOD 30 MIN: CPT | Performed by: INTERNAL MEDICINE

## 2021-08-02 PROCEDURE — 83036 HEMOGLOBIN GLYCOSYLATED A1C: CPT | Performed by: INTERNAL MEDICINE

## 2021-08-02 PROCEDURE — 80053 COMPREHEN METABOLIC PANEL: CPT | Performed by: INTERNAL MEDICINE

## 2021-08-02 RX ORDER — FAMOTIDINE 40 MG/1
40 TABLET, FILM COATED ORAL DAILY
Qty: 90 TABLET | Refills: 1 | Status: SHIPPED | OUTPATIENT
Start: 2021-08-02 | End: 2022-01-10 | Stop reason: SDUPTHER

## 2021-08-23 RX ORDER — LINAGLIPTIN 5 MG/1
TABLET, FILM COATED ORAL
Qty: 90 TABLET | Refills: 1 | Status: SHIPPED | OUTPATIENT
Start: 2021-08-23 | End: 2022-01-10 | Stop reason: SDUPTHER

## 2021-08-23 RX ORDER — TRAMADOL HYDROCHLORIDE 50 MG/1
50 TABLET ORAL DAILY PRN
COMMUNITY
Start: 2021-07-30 | End: 2021-12-06 | Stop reason: SDUPTHER

## 2021-09-07 RX ORDER — METOPROLOL SUCCINATE 50 MG/1
TABLET, EXTENDED RELEASE ORAL
Qty: 135 TABLET | Refills: 1 | Status: SHIPPED | OUTPATIENT
Start: 2021-09-07 | End: 2021-12-06

## 2021-09-23 RX ORDER — DULAGLUTIDE 1.5 MG/.5ML
INJECTION, SOLUTION SUBCUTANEOUS
Qty: 6 ML | Refills: 2 | Status: SHIPPED | OUTPATIENT
Start: 2021-09-23 | End: 2022-01-10 | Stop reason: SDUPTHER

## 2021-09-24 ENCOUNTER — HOSPITAL ENCOUNTER (OUTPATIENT)
Dept: MAMMOGRAPHY | Facility: HOSPITAL | Age: 86
Discharge: HOME | End: 2021-09-24
Admitting: INTERNAL MEDICINE

## 2021-09-24 DIAGNOSIS — Z92.89 HISTORY OF MAMMOGRAPHY, SCREENING: ICD-10-CM

## 2021-09-24 PROCEDURE — 77063 BREAST TOMOSYNTHESIS BI: CPT

## 2021-09-24 PROCEDURE — 77067 SCR MAMMO BI INCL CAD: CPT

## 2021-09-30 RX ORDER — AMLODIPINE BESYLATE 5 MG/1
5 TABLET ORAL DAILY
Qty: 90 TABLET | Refills: 0 | Status: SHIPPED | OUTPATIENT
Start: 2021-09-30 | End: 2022-01-10 | Stop reason: SDUPTHER

## 2021-10-21 ENCOUNTER — TRANSCRIBE ORDERS (OUTPATIENT)
Dept: LAB | Facility: HOSPITAL | Age: 86
End: 2021-10-21

## 2021-10-21 ENCOUNTER — LAB (OUTPATIENT)
Dept: LAB | Facility: HOSPITAL | Age: 86
End: 2021-10-21

## 2021-10-21 DIAGNOSIS — N18.4 CHRONIC KIDNEY DISEASE, STAGE IV (SEVERE) (HCC): Primary | ICD-10-CM

## 2021-10-21 DIAGNOSIS — N18.4 CHRONIC KIDNEY DISEASE, STAGE IV (SEVERE) (HCC): ICD-10-CM

## 2021-10-21 LAB
ALBUMIN SERPL-MCNC: 4 G/DL (ref 3.5–5.2)
ANION GAP SERPL CALCULATED.3IONS-SCNC: 10.6 MMOL/L (ref 5–15)
BUN SERPL-MCNC: 43 MG/DL (ref 8–23)
BUN/CREAT SERPL: 20.5 (ref 7–25)
CALCIUM SPEC-SCNC: 9.3 MG/DL (ref 8.6–10.5)
CHLORIDE SERPL-SCNC: 104 MMOL/L (ref 98–107)
CO2 SERPL-SCNC: 27.4 MMOL/L (ref 22–29)
CREAT SERPL-MCNC: 2.1 MG/DL (ref 0.57–1)
DEPRECATED RDW RBC AUTO: 45.1 FL (ref 37–54)
ERYTHROCYTE [DISTWIDTH] IN BLOOD BY AUTOMATED COUNT: 12.9 % (ref 12.3–15.4)
GFR SERPL CREATININE-BSD FRML MDRD: 22 ML/MIN/1.73
GLUCOSE SERPL-MCNC: 122 MG/DL (ref 65–99)
HCT VFR BLD AUTO: 31.6 % (ref 34–46.6)
HGB BLD-MCNC: 10.4 G/DL (ref 12–15.9)
MCH RBC QN AUTO: 31.4 PG (ref 26.6–33)
MCHC RBC AUTO-ENTMCNC: 32.9 G/DL (ref 31.5–35.7)
MCV RBC AUTO: 95.5 FL (ref 79–97)
PHOSPHATE SERPL-MCNC: 3.7 MG/DL (ref 2.5–4.5)
PLATELET # BLD AUTO: 344 10*3/MM3 (ref 140–450)
PMV BLD AUTO: 9.5 FL (ref 6–12)
POTASSIUM SERPL-SCNC: 4.7 MMOL/L (ref 3.5–5.2)
RBC # BLD AUTO: 3.31 10*6/MM3 (ref 3.77–5.28)
SODIUM SERPL-SCNC: 142 MMOL/L (ref 136–145)
WBC # BLD AUTO: 5.53 10*3/MM3 (ref 3.4–10.8)

## 2021-10-21 PROCEDURE — 80069 RENAL FUNCTION PANEL: CPT

## 2021-10-21 PROCEDURE — 36415 COLL VENOUS BLD VENIPUNCTURE: CPT

## 2021-10-21 PROCEDURE — 85027 COMPLETE CBC AUTOMATED: CPT

## 2021-10-29 ENCOUNTER — TRANSCRIBE ORDERS (OUTPATIENT)
Dept: ADMINISTRATIVE | Facility: HOSPITAL | Age: 86
End: 2021-10-29

## 2021-10-29 DIAGNOSIS — C34.12 PANCOASTS SYNDROME, LEFT (HCC): ICD-10-CM

## 2021-10-29 DIAGNOSIS — C34.11 MALIGNANT NEOPLASM OF UPPER LOBE OF RIGHT LUNG (HCC): Primary | ICD-10-CM

## 2021-10-29 DIAGNOSIS — C34.12 MALIGNANT NEOPLASM OF UPPER LOBE OF LEFT LUNG (HCC): ICD-10-CM

## 2021-11-01 ENCOUNTER — HOSPITAL ENCOUNTER (OUTPATIENT)
Dept: PET IMAGING | Facility: HOSPITAL | Age: 86
Discharge: HOME | End: 2021-11-01

## 2021-11-01 DIAGNOSIS — C34.11 MALIGNANT NEOPLASM OF UPPER LOBE OF RIGHT LUNG (HCC): ICD-10-CM

## 2021-11-01 DIAGNOSIS — C34.12 MALIGNANT NEOPLASM OF UPPER LOBE OF LEFT LUNG (HCC): ICD-10-CM

## 2021-11-01 PROCEDURE — 0 FLUDEOXYGLUCOSE F18 SOLUTION: Performed by: INTERNAL MEDICINE

## 2021-11-01 PROCEDURE — A9552 F18 FDG: HCPCS | Performed by: INTERNAL MEDICINE

## 2021-11-01 PROCEDURE — 78815 PET IMAGE W/CT SKULL-THIGH: CPT

## 2021-11-01 RX ADMIN — FLUDEOXYGLUCOSE F18 1 DOSE: 300 INJECTION INTRAVENOUS at 08:31

## 2021-11-03 PROBLEM — N18.4 STAGE 4 CHRONIC KIDNEY DISEASE: Status: ACTIVE | Noted: 2021-10-29

## 2021-11-03 PROBLEM — Z99.81 OXYGEN DEPENDENT: Status: ACTIVE | Noted: 2021-11-03

## 2021-11-03 PROBLEM — D47.2 MONOCLONAL PARAPROTEINEMIA: Status: ACTIVE | Noted: 2021-10-29

## 2021-11-03 PROBLEM — G62.9 PERIPHERAL NEUROPATHY: Status: ACTIVE | Noted: 2021-11-03

## 2021-11-03 PROBLEM — M06.9 RHEUMATOID ARTHRITIS: Status: ACTIVE | Noted: 2021-11-03

## 2021-11-03 PROBLEM — J30.9 ALLERGIC RHINITIS: Status: ACTIVE | Noted: 2021-11-03

## 2021-11-03 PROBLEM — I50.9 CONGESTIVE HEART FAILURE: Status: ACTIVE | Noted: 2021-11-03

## 2021-11-03 PROBLEM — E11.9 TYPE 2 DIABETES MELLITUS WITHOUT COMPLICATION (HCC): Status: ACTIVE | Noted: 2021-10-29

## 2021-11-03 PROBLEM — J45.909 ASTHMA: Status: ACTIVE | Noted: 2021-11-03

## 2021-11-03 PROBLEM — C34.10 MALIGNANT NEOPLASM OF UPPER LOBE, BRONCHUS OR LUNG: Status: RESOLVED | Noted: 2021-11-03 | Resolved: 2021-11-03

## 2021-11-03 PROBLEM — I10 ESSENTIAL HYPERTENSION: Status: ACTIVE | Noted: 2021-11-03

## 2021-11-03 PROBLEM — E78.5 HYPERLIPIDEMIA: Status: ACTIVE | Noted: 2021-10-29

## 2021-11-03 PROBLEM — G43.909 MIGRAINE: Status: ACTIVE | Noted: 2021-11-03

## 2021-11-03 PROBLEM — M48.061 LUMBAR SPINAL STENOSIS: Status: ACTIVE | Noted: 2021-11-03

## 2021-11-03 PROBLEM — E55.9 VITAMIN D DEFICIENCY: Status: ACTIVE | Noted: 2021-10-29

## 2021-11-03 PROBLEM — M85.80 OSTEOPENIA: Status: ACTIVE | Noted: 2021-11-03

## 2021-11-03 PROBLEM — K21.9 GERD (GASTROESOPHAGEAL REFLUX DISEASE): Status: ACTIVE | Noted: 2021-11-03

## 2021-11-03 PROBLEM — J44.9 CHRONIC OBSTRUCTIVE PULMONARY DISEASE: Status: ACTIVE | Noted: 2021-10-29

## 2021-11-03 PROBLEM — C34.10 MALIGNANT NEOPLASM OF UPPER LOBE, BRONCHUS OR LUNG: Status: ACTIVE | Noted: 2021-11-03

## 2021-11-12 ENCOUNTER — TRANSCRIBE ORDERS (OUTPATIENT)
Dept: VASCULAR SURGERY | Facility: HOSPITAL | Age: 86
End: 2021-11-12

## 2021-11-12 DIAGNOSIS — I73.9 CLAUDICATION (HCC): Primary | ICD-10-CM

## 2021-11-12 DIAGNOSIS — I72.9 PSEUDOANEURYSM (HCC): ICD-10-CM

## 2021-12-03 PROBLEM — I50.9 CONGESTIVE HEART FAILURE: Status: RESOLVED | Noted: 2021-11-03 | Resolved: 2021-12-03

## 2021-12-03 PROBLEM — I65.29 CAROTID ARTERY STENOSIS: Status: ACTIVE | Noted: 2021-12-03

## 2021-12-06 ENCOUNTER — OFFICE VISIT (OUTPATIENT)
Dept: VASCULAR SURGERY | Facility: HOSPITAL | Age: 86
End: 2021-12-06

## 2021-12-06 ENCOUNTER — OFFICE VISIT (OUTPATIENT)
Dept: INTERNAL MEDICINE | Facility: CLINIC | Age: 86
End: 2021-12-06

## 2021-12-06 ENCOUNTER — HOSPITAL ENCOUNTER (OUTPATIENT)
Dept: CARDIOLOGY | Facility: HOSPITAL | Age: 86
Discharge: HOME | End: 2021-12-06

## 2021-12-06 ENCOUNTER — TELEPHONE (OUTPATIENT)
Dept: INTERNAL MEDICINE | Facility: CLINIC | Age: 86
End: 2021-12-06

## 2021-12-06 VITALS
HEIGHT: 65 IN | OXYGEN SATURATION: 100 % | WEIGHT: 165 LBS | SYSTOLIC BLOOD PRESSURE: 128 MMHG | TEMPERATURE: 96.4 F | BODY MASS INDEX: 27.49 KG/M2 | DIASTOLIC BLOOD PRESSURE: 70 MMHG | HEART RATE: 71 BPM

## 2021-12-06 VITALS
RESPIRATION RATE: 18 BRPM | HEART RATE: 58 BPM | DIASTOLIC BLOOD PRESSURE: 72 MMHG | TEMPERATURE: 97.3 F | SYSTOLIC BLOOD PRESSURE: 134 MMHG | OXYGEN SATURATION: 90 %

## 2021-12-06 DIAGNOSIS — T81.718D FEMORAL ARTERY PSEUDOANEURYSM COMPLICATING CARDIAC CATHETERIZATION, SUBSEQUENT ENCOUNTER (HCC): Primary | ICD-10-CM

## 2021-12-06 DIAGNOSIS — M54.6 CHRONIC RIGHT-SIDED THORACIC BACK PAIN: Primary | ICD-10-CM

## 2021-12-06 DIAGNOSIS — G89.29 CHRONIC RIGHT-SIDED THORACIC BACK PAIN: Primary | ICD-10-CM

## 2021-12-06 DIAGNOSIS — I73.9 CLAUDICATION (HCC): ICD-10-CM

## 2021-12-06 DIAGNOSIS — M25.512 ACUTE PAIN OF LEFT SHOULDER: Primary | ICD-10-CM

## 2021-12-06 DIAGNOSIS — I72.9 PSEUDOANEURYSM (HCC): ICD-10-CM

## 2021-12-06 DIAGNOSIS — I72.4 FEMORAL ARTERY PSEUDOANEURYSM COMPLICATING CARDIAC CATHETERIZATION, SUBSEQUENT ENCOUNTER (HCC): Primary | ICD-10-CM

## 2021-12-06 DIAGNOSIS — M54.6 CHRONIC RIGHT-SIDED THORACIC BACK PAIN: ICD-10-CM

## 2021-12-06 DIAGNOSIS — G89.29 CHRONIC RIGHT-SIDED THORACIC BACK PAIN: ICD-10-CM

## 2021-12-06 DIAGNOSIS — Z79.899 ENCOUNTER FOR LONG-TERM (CURRENT) USE OF HIGH-RISK MEDICATION: ICD-10-CM

## 2021-12-06 LAB
AMPHET+METHAMPHET UR QL: NEGATIVE
AMPHETAMINE INTERNAL CONTROL: NORMAL
AMPHETAMINES UR QL: NEGATIVE
BARBITURATE INTERNAL CONTROL: NORMAL
BARBITURATES UR QL SCN: NEGATIVE
BENZODIAZ UR QL SCN: NEGATIVE
BENZODIAZEPINE INTERNAL CONTROL: NORMAL
BH CV LEFT GROIN PSA PROCEDURE SCRIPTING LRR: 1
BH CV LOWER ARTERIAL LEFT ABI RATIO: 0.83
BH CV LOWER ARTERIAL LEFT DORSALIS PEDIS SYS MAX: 118 MMHG
BH CV LOWER ARTERIAL LEFT GREAT TOE SYS MAX: 88 MMHG
BH CV LOWER ARTERIAL LEFT POPLITEAL SYS MAX: 116 MMHG
BH CV LOWER ARTERIAL LEFT POST TIBIAL SYS MAX: 122 MMHG
BH CV LOWER ARTERIAL LEFT TBI RATIO: 0.6
BH CV LOWER ARTERIAL RIGHT ABI RATIO: 0.7
BH CV LOWER ARTERIAL RIGHT DORSALIS PEDIS SYS MAX: 103 MMHG
BH CV LOWER ARTERIAL RIGHT GREAT TOE SYS MAX: 91 MMHG
BH CV LOWER ARTERIAL RIGHT LOW THIGH SYS MAX: 125 MMHG
BH CV LOWER ARTERIAL RIGHT POPLITEAL SYS MAX: 255 MMHG
BH CV LOWER ARTERIAL RIGHT POST TIBIAL SYS MAX: 97 MMHG
BH CV LOWER ARTERIAL RIGHT TBI RATIO: 0.62
BH CV VAS L ABI: 0.83
BH CV VAS L ACTIVE AREA: 1.5 CM
BH CV VAS L ACTIVE WIDTH: 1.7 CM
BH CV VAS L CFA VELOCITY EDV: 23 CM/SEC
BH CV VAS L EIA VELOCITY EDV: 0 CM/SEC
BH CV VAS L EIA VELOCITY PSV: 179 CM/SEC
BH CV VAS L PFA VELOCITY EDV: 0 CM/SEC
BH CV VAS L PSA NECK WIDTH: 0.4 CM
BH CV VAS L PSEUDOANEURYSM LONG: 1 CM
BH CV VAS L PSEUDOANEURYSM TRV: 1.6 CM
BH CV VAS L SFA VELOCITY EDV: 0 CM/SEC
BH CV VAS R ABI: 0.7
BUPRENORPHINE INTERNAL CONTROL: NORMAL
BUPRENORPHINE SERPL-MCNC: NEGATIVE NG/ML
CANNABINOIDS SERPL QL: NEGATIVE
COCAINE INTERNAL CONTROL: NORMAL
COCAINE UR QL: NEGATIVE
EXPIRATION DATE: NORMAL
LEFT GROIN CFA SYS: 185 CM/SEC
Lab: 2.2 CM
Lab: NORMAL
MAXIMAL PREDICTED HEART RATE: 136 BPM
MAXIMAL PREDICTED HEART RATE: 136 BPM
MDMA (ECSTASY) INTERNAL CONTROL: NORMAL
MDMA UR QL SCN: NEGATIVE
METHADONE INTERNAL CONTROL: NORMAL
METHADONE UR QL SCN: NEGATIVE
METHAMPHETAMINE INTERNAL CONTROL: NORMAL
OPIATES INTERNAL CONTROL: NORMAL
OPIATES UR QL: NEGATIVE
OXYCODONE INTERNAL CONTROL: NORMAL
OXYCODONE UR QL SCN: NEGATIVE
PCP UR QL SCN: NEGATIVE
PHENCYCLIDINE INTERNAL CONTROL: NORMAL
PROX PFA PSV LEFT: 76 CM/SEC
PROX SFA PSV LEFT: 0 CM/SEC
STRESS TARGET HR: 116 BPM
STRESS TARGET HR: 116 BPM
THC INTERNAL CONTROL: NORMAL
UPPER ARTERIAL LEFT ARM BRACHIAL SYS MAX: 134 MMHG
UPPER ARTERIAL RIGHT ARM BRACHIAL SYS MAX: 147 MMHG

## 2021-12-06 PROCEDURE — 93923 UPR/LXTR ART STDY 3+ LVLS: CPT

## 2021-12-06 PROCEDURE — 99213 OFFICE O/P EST LOW 20 MIN: CPT | Performed by: NURSE PRACTITIONER

## 2021-12-06 PROCEDURE — 93926 LOWER EXTREMITY STUDY: CPT | Performed by: SURGERY

## 2021-12-06 PROCEDURE — 99212 OFFICE O/P EST SF 10 MIN: CPT | Performed by: SURGERY

## 2021-12-06 PROCEDURE — 93923 UPR/LXTR ART STDY 3+ LVLS: CPT | Performed by: SURGERY

## 2021-12-06 PROCEDURE — 93926 LOWER EXTREMITY STUDY: CPT

## 2021-12-06 PROCEDURE — 80305 DRUG TEST PRSMV DIR OPT OBS: CPT | Performed by: NURSE PRACTITIONER

## 2021-12-06 PROCEDURE — 73030 X-RAY EXAM OF SHOULDER: CPT | Performed by: NURSE PRACTITIONER

## 2021-12-06 RX ORDER — BLOOD SUGAR DIAGNOSTIC
STRIP MISCELLANEOUS
COMMUNITY
Start: 2021-09-16 | End: 2022-01-10 | Stop reason: SDUPTHER

## 2021-12-06 RX ORDER — METOPROLOL SUCCINATE 50 MG/1
2 TABLET, EXTENDED RELEASE ORAL DAILY
COMMUNITY
End: 2022-01-24

## 2021-12-06 RX ORDER — FUROSEMIDE 40 MG/1
TABLET ORAL
COMMUNITY
Start: 2021-10-15 | End: 2022-04-14

## 2021-12-06 NOTE — PROGRESS NOTES
"Chief Complaint  Arm Pain (left upper arm pain x 3 weeks. Gel and tylenol not helping) and Back Pain (right sided mid back pain x 3 months)    Subjective          Charlette Rai presents to [facility]  Patient reports history of chronic pain, previously managed with tramadol.  Patient reports that her last few appointments with Dr. James have been rescheduled and she has been out of her tramadol x3 months.  Patient reports history of thoracic back pain, worsened after receiving radiation earlier this year.  Patient denies injury, erythema, edema.  She has been treating with Tylenol with minimal relief.  Patient states that she has been told her that her lung cancer has returned, will have PET scan early next year to evaluate further.  Patient reports left shoulder pain x3 weeks.  Denies injury, erythema, edema, warmth.  Patient reports decreased range of motion, pain exacerbated by movement and putting on her clothing.  Tylenol has not helped to relieve this pain.  Patient requesting refill on tramadol, would like imaging of her shoulder.      Objective   Vital Signs:   /70   Pulse 71   Temp 96.4 °F (35.8 °C)   Ht 165.1 cm (65\")   Wt 74.8 kg (165 lb)   SpO2 100%   BMI 27.46 kg/m²     Physical Exam  Constitutional:       Appearance: Normal appearance. She is normal weight.   HENT:      Head: Normocephalic and atraumatic.      Nose: Nose normal.      Mouth/Throat:      Mouth: Mucous membranes are moist.      Pharynx: Oropharynx is clear.   Eyes:      Extraocular Movements: Extraocular movements intact.      Conjunctiva/sclera: Conjunctivae normal.      Pupils: Pupils are equal, round, and reactive to light.   Cardiovascular:      Rate and Rhythm: Normal rate and regular rhythm.      Heart sounds: Normal heart sounds.   Pulmonary:      Effort: Pulmonary effort is normal.      Breath sounds: Normal breath sounds.   Musculoskeletal:      Comments: Anterior and " posterior left shoulder tenderness on palpation; pain reported with abduction, abduction, internal and external rotation   Skin:     General: Skin is warm and dry.   Neurological:      General: No focal deficit present.      Mental Status: She is alert and oriented to person, place, and time.   Psychiatric:         Mood and Affect: Mood normal.         Behavior: Behavior normal.         Thought Content: Thought content normal.        Result Review :                 Assessment and Plan    Diagnoses and all orders for this visit:    1. Acute pain of left shoulder (Primary)  Assessment & Plan:  Limited ROM secondary to pain.  Will obtain imaging in clinic today and determine further intervention based on results.  Discussed with patient that referral to PT may be warranted prior to pursuing MRI.  Will restart tramadol per patient request, refill per Dr. James.  She will follow up with Dr. James in 1 month to discuss chronic conditions and response to tramadol, sooner if concerns arise.    Orders:  -     XR Shoulder 2+ View Left (In Office)    2. Encounter for long-term (current) use of high-risk medication  -     POC Urine Drug Screen [facility]    3. Chronic right-sided thoracic back pain      Follow Up   Return in about 1 month (around 1/6/2022) for Follow up with Dr. James.  Patient was given instructions and counseling regarding her condition or for health maintenance advice. Please see specific information pulled into the AVS if appropriate.

## 2021-12-06 NOTE — PROGRESS NOTES
[facility]   Follow up Office    Patient Name: Charlette Rai  : 1935  MRN: [id number]  Primary Care Physician:  Paloma James MD      Subjective   Subjective     HPI:    Charlette Rai is a 86 y.o. female here for routine follow-up for left femoral pseudoaneurysm.  Still having some pain.  Her lung cancer is back and she has undergone radiotherapy.  No surgery.      Objective     Vitals:   Temp:  [96.4 °F (35.8 °C)-97.3 °F (36.3 °C)] 97.3 °F (36.3 °C)  Heart Rate:  [58-71] 58  Resp:  [18] 18  BP: (128-147)/(70-72) 134/72    Physical Exam      General: Alert, no acute distress.  Extremities: Symmetric.    Diagnostic studies: A left groin ultrasound demonstrates a small femoral pseudoaneurysm with a maximum diameter of 1.7 cm.  Her left GILLIAN is 0.83.    Assessment/Plan   Assessment / Plan     Diagnoses and all orders for this visit:    1. 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  This has remained stable.  I discussed with her management options to include ultrasound-guided thrombin injection versus surgical intervention.  At this time she would like to concentrate in managing her cancer.  The plan will be for her to follow-up with a repeat study in 6 months.  She will return sooner should her symptoms worsen.        Electronically signed by Wan Barksdale MD, 21, 2:49 PM EST.

## 2021-12-06 NOTE — ASSESSMENT & PLAN NOTE
Limited ROM secondary to pain.  Will obtain imaging in clinic today and determine further intervention based on results.  Discussed with patient that referral to PT may be warranted prior to pursuing MRI.  Will restart tramadol per patient request, refill per Dr. James.  She will follow up with Dr. James in 1 month to discuss chronic conditions and response to tramadol, sooner if concerns arise.

## 2021-12-08 ENCOUNTER — OFFICE VISIT (OUTPATIENT)
Dept: CARDIOLOGY | Facility: CLINIC | Age: 86
End: 2021-12-08

## 2021-12-08 VITALS
WEIGHT: 163 LBS | HEIGHT: 65 IN | HEART RATE: 93 BPM | DIASTOLIC BLOOD PRESSURE: 69 MMHG | BODY MASS INDEX: 27.16 KG/M2 | SYSTOLIC BLOOD PRESSURE: 152 MMHG

## 2021-12-08 DIAGNOSIS — I65.29 STENOSIS OF CAROTID ARTERY, UNSPECIFIED LATERALITY: Primary | ICD-10-CM

## 2021-12-08 DIAGNOSIS — E78.2 MIXED HYPERLIPIDEMIA: ICD-10-CM

## 2021-12-08 DIAGNOSIS — I10 ESSENTIAL HYPERTENSION: ICD-10-CM

## 2021-12-08 DIAGNOSIS — I73.9 PERIPHERAL VASCULAR DISEASE OF LOWER EXTREMITY (HCC): ICD-10-CM

## 2021-12-08 PROBLEM — C34.11 MALIGNANT NEOPLASM OF UPPER LOBE OF RIGHT LUNG: Status: ACTIVE | Noted: 2021-06-07

## 2021-12-08 PROCEDURE — 99214 OFFICE O/P EST MOD 30 MIN: CPT | Performed by: INTERNAL MEDICINE

## 2021-12-08 RX ORDER — TRAMADOL HYDROCHLORIDE 50 MG/1
50 TABLET ORAL DAILY PRN
Qty: 30 TABLET | Refills: 0 | Status: SHIPPED | OUTPATIENT
Start: 2021-12-08 | End: 2022-01-10 | Stop reason: SDUPTHER

## 2021-12-08 NOTE — PROGRESS NOTES
Chief Complaint  Hypertension and Palpitations    Subjective    Patient has shortness of breath which is stable recently diagnosed with recurrence of malignancy in the right upper lobe not amenable to treatment she has undergone radiation.  Denies any chest pain problems    Past Medical History:   Diagnosis Date   • Allergic rhinitis    • Anemia    • Arthritis    • Asthma    • Bladder disorder    • Cancer (HCC)    • Cataract    • Chronic kidney disease     stage 3   • CKD (chronic kidney disease) stage 4, GFR 15-29 ml/min (HCC)    • Condition not found     ulcer   • Congestive heart failure (CHF) (HCC)    • COPD (chronic obstructive pulmonary disease) (HCC)    • Coronary artery disease    • Deep vein thrombosis (HCC)    • Diabetes mellitus (HCC)    • Disease of thyroid gland    • Essential hypertension    • Gastric ulcer    • GERD (gastroesophageal reflux disease)    • Heart murmur    • Hyperlipemia    • Hyperlipidemia    • Hypertension    • Leukocytopenia    • Limb swelling    • Lumbago    • Lumbar spinal stenosis    • Lung cancer (HCC)    • Lung disease    • Migraine headache    • Multiple joint pain    • Osteopenia    • Reflux esophagitis    • Shortness of breath    • Thyroid disorder    • Thyroid nodule    • Vascular disease    • Vitamin D deficiency          Current Outpatient Medications:   •  albuterol sulfate  (90 Base) MCG/ACT inhaler, Inhale 2 puffs Every 4 (Four) Hours As Needed for Wheezing., Disp: , Rfl:   •  amLODIPine (NORVASC) 5 MG tablet, Take 1 tablet by mouth Daily. (Patient taking differently: Take 2.5 mg by mouth Daily.), Disp: 90 tablet, Rfl: 0  •  atorvastatin (LIPITOR) 20 MG tablet, Take 20 mg by mouth Every Night., Disp: , Rfl:   •  cilostazol (PLETAL) 100 MG tablet, Take 1 tablet by mouth 2 (two) times a day., Disp: , Rfl:   •  Dexilant 60 MG capsule, TAKE 1 CAPSULE DAILY, Disp: 90 capsule, Rfl: 1  •  Diclofenac Sodium (Voltaren) 1 % gel gel, Apply 2 g topically to the appropriate  area as directed 4 (Four) Times a Day., Disp: , Rfl:   •  famotidine (PEPCID) 40 MG tablet, Take 1 tablet by mouth Daily., Disp: 90 tablet, Rfl: 1  •  fenofibrate (FENOGLIDE) 120 MG tablet, Take 1 tablet by mouth Daily., Disp: 90 tablet, Rfl: 1  •  ferrous sulfate 325 (65 FE) MG tablet, Take 1 tablet by mouth 2 (two) times a day., Disp: , Rfl:   •  furosemide (LASIX) 40 MG tablet, TAKE 2 TABLETS BY MOUTH ON monday, wednesday and friday and TAKE 1 TABLET ON other DAYS, Disp: , Rfl:   •  levocetirizine (XYZAL) 5 MG tablet, Take 1 tablet by mouth Daily., Disp: 90 tablet, Rfl: 1  •  losartan (COZAAR) 25 MG tablet, Take 1 tablet by mouth Daily., Disp: , Rfl:   •  methylcellulose, Laxative, (CITRUCEL) 500 MG tablet tablet, Take 2 tablets by mouth Every 4 (Four) Hours As Needed., Disp: , Rfl:   •  metoprolol succinate XL (TOPROL-XL) 50 MG 24 hr tablet, Take 2 tablets by mouth Daily., Disp: , Rfl:   •  nitroglycerin (Nitrostat) 0.4 MG SL tablet, Nitrostat 0.4 mg sublingual tablet, sublingual place 1 tablet (0.4 mg) by buccal route at the first sign of an attack; no more than 3 tabs are recommended within a 15 minute period.   Active, Disp: , Rfl:   •  OneTouch Ultra test strip, AS DIRECTED TO TEST BLOOD SUGAR ONCE A DAY AS NEEDED, Disp: , Rfl:   •  Symbicort 160-4.5 MCG/ACT inhaler, Inhale 2 puffs 2 (two) times a day., Disp: , Rfl:   •  Tradjenta 5 MG tablet tablet, TAKE 1 TABLET ONCE DAILY, Disp: 90 tablet, Rfl: 1  •  traMADol (ULTRAM) 50 MG tablet, Take 1 tablet by mouth Daily As Needed (once daily as needed)., Disp: 30 tablet, Rfl: 0  •  Trulicity 1.5 MG/0.5ML solution pen-injector, INJECT 0.5ML (=1.5 MG) SUBCUTANEOUSLY EVERY 7 DAYS IN THE ABDOMEN, THIGH, OR UPPER ARM ROTATING INJECTION SITES, Disp: 6 mL, Rfl: 2  •  vitamin D (ERGOCALCIFEROL) 1.25 MG (02773 UT) capsule capsule, Take 50,000 Units by mouth 1 (One) Time Per Week., Disp: , Rfl:     Medications Discontinued During This Encounter   Medication Reason   •  "polycarbophil (calcium polycarbophil) 625 MG tablet tablet Discontinued by another clinician     No Known Allergies     Social History     Tobacco Use   • Smoking status: Former Smoker     Packs/day: 1.00     Years: 30.00     Pack years: 30.00     Types: Cigarettes     Start date:      Quit date: 2004     Years since quittin.4   • Smokeless tobacco: Never Used   Vaping Use   • Vaping Use: Never used   Substance Use Topics   • Alcohol use: Never   • Drug use: Never       Family History   Problem Relation Age of Onset   • Arthritis Mother    • Arthritis Father    • Cancer Brother    • Diabetes Brother    • Other Brother         blood disease    • Prostate cancer Brother    • Cancer Brother    • Prostate cancer Brother    • Cancer Brother    • Cancer Brother         Objective     /69   Pulse 93   Ht 165.1 cm (65\")   Wt 73.9 kg (163 lb)   BMI 27.12 kg/m²       Physical Exam    General Appearance:   · no acute distress  · Alert and oriented x3  HENT:   · lips not cyanotic  · Atraumatic  Neck:  · No jvd   · supple  Respiratory:  · no respiratory distress  · Diminished breath sounds in the right middle lobe  · no rales  Cardiovascular:  · Regular rate and rhythm  · no S3, no S4   · 2/6 systolic early peaking murmur  · no rub  Extremities  · No cyanosis  · lower extremity edema: none    Skin:   · warm, dry  · No rashes      Result Review :     No results found for: PROBNP  CMP    CMP 7/22/21 8/2/21 10/21/21   Glucose 111 (A) 116 (A) 122 (A)   BUN 26 (A) 31 (A) 43 (A)   Creatinine 2.22 (A) 2.10 (A) 2.10 (A)   eGFR Non African Am 21 (A) 22 (A) 22 (A)   Sodium 140 140 142   Potassium 5.2 4.6 4.7   Chloride 102 99 104   Calcium 9.5 9.7 9.3   Albumin 4.00 4.40 4.00   Total Bilirubin  0.3    Alkaline Phosphatase  79    AST (SGOT)  25    ALT (SGPT)  12    (A) Abnormal value            CBC w/diff    CBC w/Diff 5/3/21 7/22/21 10/21/21   WBC 5.27 4.72 5.53   RBC 3.17 (A) 3.11 (A) 3.31 (A)   Hemoglobin 10.2 (A) " 9.9 (A) 10.4 (A)   Hematocrit 31.4 (A) 30.5 (A) 31.6 (A)   MCV 99.1 (A) 98.1 (A) 95.5   MCH 32.2 (A) 31.8 31.4   MCHC 32.5 (A) 32.5 32.9   RDW 13.3 12.3 12.9   Platelets 314 335 344   Neutrophil Rel % 59.4     Lymphocyte Rel % 20.5     Monocyte Rel % 15.4 (A)     Eosinophil Rel % 3.4     Basophil Rel % 0.9     (A) Abnormal value             Lab Results   Component Value Date    TSH 1.940 08/25/2020      Lab Results   Component Value Date    FREET4 1.4 08/25/2020      No results found for: DDIMERQUANT  No results found for: MG   No results found for: DIGOXIN   Lab Results   Component Value Date    TROPONINT 0.02 05/03/2021           Lipid Panel    Lipid Panel 3/8/21 5/5/21 8/2/21   Total Cholesterol   164   Total Cholesterol 145 145    Triglycerides 169 (A) 83 182 (A)   HDL Cholesterol 49 61 (A) 48   VLDL Cholesterol 34 17 31   LDL Cholesterol  62 (A) 67 (A) 85   LDL/HDL Ratio   1.66   (A) Abnormal value       Comments are available for some flowsheets but are not being displayed.           No results found for: POCTROP                   Diagnoses and all orders for this visit:    1. Stenosis of carotid artery, unspecified laterality (Primary)  Assessment & Plan:  Nonobstructive in nature      2. Mixed hyperlipidemia  Assessment & Plan:  Lipitor 20 nightly tolerating well and her LDL is at goal      3. Essential hypertension  Assessment & Plan:  Mild systolic elevation previous blood pressures have been within range would not recommend further escalation of therapy can continue on losartan 25 metoprolol 50 daily      4. Peripheral vascular disease of lower extremity (HCC)          Follow Up     Return in about 1 year (around 12/8/2022).          Patient was given instructions and counseling regarding her condition or for health maintenance advice. Please see specific information pulled into the AVS if appropriate.

## 2021-12-08 NOTE — ASSESSMENT & PLAN NOTE
Mild systolic elevation previous blood pressures have been within range would not recommend further escalation of therapy can continue on losartan 25 metoprolol 50 daily

## 2021-12-13 RX ORDER — BLOOD SUGAR DIAGNOSTIC
STRIP MISCELLANEOUS
OUTPATIENT
Start: 2021-12-13

## 2021-12-13 RX ORDER — DEXLANSOPRAZOLE 60 MG/1
CAPSULE, DELAYED RELEASE ORAL
Qty: 90 CAPSULE | Refills: 1 | Status: SHIPPED | OUTPATIENT
Start: 2021-12-13 | End: 2022-01-10 | Stop reason: SDUPTHER

## 2022-01-10 ENCOUNTER — OFFICE VISIT (OUTPATIENT)
Dept: INTERNAL MEDICINE | Facility: CLINIC | Age: 87
End: 2022-01-10

## 2022-01-10 ENCOUNTER — TELEPHONE (OUTPATIENT)
Dept: INTERNAL MEDICINE | Facility: CLINIC | Age: 87
End: 2022-01-10

## 2022-01-10 VITALS
HEART RATE: 138 BPM | RESPIRATION RATE: 18 BRPM | TEMPERATURE: 97.5 F | OXYGEN SATURATION: 96 % | WEIGHT: 164.6 LBS | HEIGHT: 65 IN | BODY MASS INDEX: 27.42 KG/M2 | DIASTOLIC BLOOD PRESSURE: 80 MMHG | SYSTOLIC BLOOD PRESSURE: 156 MMHG

## 2022-01-10 DIAGNOSIS — E11.9 TYPE 2 DIABETES MELLITUS WITHOUT COMPLICATION, WITHOUT LONG-TERM CURRENT USE OF INSULIN: ICD-10-CM

## 2022-01-10 DIAGNOSIS — E78.2 MIXED HYPERLIPIDEMIA: ICD-10-CM

## 2022-01-10 DIAGNOSIS — G89.29 CHRONIC RIGHT-SIDED THORACIC BACK PAIN: ICD-10-CM

## 2022-01-10 DIAGNOSIS — I10 ESSENTIAL HYPERTENSION: ICD-10-CM

## 2022-01-10 DIAGNOSIS — Z00.00 ENCOUNTER FOR SUBSEQUENT ANNUAL WELLNESS VISIT (AWV) IN MEDICARE PATIENT: Primary | ICD-10-CM

## 2022-01-10 DIAGNOSIS — M54.6 CHRONIC RIGHT-SIDED THORACIC BACK PAIN: ICD-10-CM

## 2022-01-10 PROBLEM — Z87.891 EX-SMOKER: Status: ACTIVE | Noted: 2022-01-10

## 2022-01-10 PROBLEM — R60.9 EDEMA: Status: ACTIVE | Noted: 2021-10-29

## 2022-01-10 PROBLEM — I73.9 PERIPHERAL VASCULAR DISEASE: Status: ACTIVE | Noted: 2022-01-10

## 2022-01-10 LAB
ALBUMIN SERPL-MCNC: 4.6 G/DL (ref 3.5–5.2)
ALBUMIN/GLOB SERPL: 1.8 G/DL
ALP SERPL-CCNC: 91 U/L (ref 39–117)
ALT SERPL W P-5'-P-CCNC: 8 U/L (ref 1–33)
ANION GAP SERPL CALCULATED.3IONS-SCNC: 9.8 MMOL/L (ref 5–15)
AST SERPL-CCNC: 18 U/L (ref 1–32)
BASOPHILS # BLD AUTO: 0.05 10*3/MM3 (ref 0–0.2)
BASOPHILS NFR BLD AUTO: 0.9 % (ref 0–1.5)
BILIRUB SERPL-MCNC: 0.3 MG/DL (ref 0–1.2)
BUN SERPL-MCNC: 50 MG/DL (ref 8–23)
BUN/CREAT SERPL: 19.1 (ref 7–25)
CALCIUM SPEC-SCNC: 9.6 MG/DL (ref 8.6–10.5)
CHLORIDE SERPL-SCNC: 100 MMOL/L (ref 98–107)
CHOLEST SERPL-MCNC: 225 MG/DL (ref 0–200)
CO2 SERPL-SCNC: 29.2 MMOL/L (ref 22–29)
CREAT SERPL-MCNC: 2.62 MG/DL (ref 0.57–1)
DEPRECATED RDW RBC AUTO: 44.8 FL (ref 37–54)
EOSINOPHIL # BLD AUTO: 0.13 10*3/MM3 (ref 0–0.4)
EOSINOPHIL NFR BLD AUTO: 2.4 % (ref 0.3–6.2)
ERYTHROCYTE [DISTWIDTH] IN BLOOD BY AUTOMATED COUNT: 12.7 % (ref 12.3–15.4)
GFR SERPL CREATININE-BSD FRML MDRD: 17 ML/MIN/1.73
GLOBULIN UR ELPH-MCNC: 2.6 GM/DL
GLUCOSE SERPL-MCNC: 94 MG/DL (ref 65–99)
HBA1C MFR BLD: 6.34 % (ref 4.8–5.6)
HCT VFR BLD AUTO: 33.9 % (ref 34–46.6)
HDLC SERPL-MCNC: 44 MG/DL (ref 40–60)
HGB BLD-MCNC: 11 G/DL (ref 12–15.9)
IMM GRANULOCYTES # BLD AUTO: 0.02 10*3/MM3 (ref 0–0.05)
IMM GRANULOCYTES NFR BLD AUTO: 0.4 % (ref 0–0.5)
LDLC SERPL CALC-MCNC: 136 MG/DL (ref 0–100)
LDLC/HDLC SERPL: 2.96 {RATIO}
LYMPHOCYTES # BLD AUTO: 1 10*3/MM3 (ref 0.7–3.1)
LYMPHOCYTES NFR BLD AUTO: 18.4 % (ref 19.6–45.3)
MCH RBC QN AUTO: 31.7 PG (ref 26.6–33)
MCHC RBC AUTO-ENTMCNC: 32.4 G/DL (ref 31.5–35.7)
MCV RBC AUTO: 97.7 FL (ref 79–97)
MONOCYTES # BLD AUTO: 0.91 10*3/MM3 (ref 0.1–0.9)
MONOCYTES NFR BLD AUTO: 16.8 % (ref 5–12)
NEUTROPHILS NFR BLD AUTO: 3.32 10*3/MM3 (ref 1.7–7)
NEUTROPHILS NFR BLD AUTO: 61.1 % (ref 42.7–76)
NRBC BLD AUTO-RTO: 0 /100 WBC (ref 0–0.2)
PLATELET # BLD AUTO: 351 10*3/MM3 (ref 140–450)
PMV BLD AUTO: 9.9 FL (ref 6–12)
POTASSIUM SERPL-SCNC: 4.3 MMOL/L (ref 3.5–5.2)
PROT SERPL-MCNC: 7.2 G/DL (ref 6–8.5)
RBC # BLD AUTO: 3.47 10*6/MM3 (ref 3.77–5.28)
SODIUM SERPL-SCNC: 139 MMOL/L (ref 136–145)
TRIGL SERPL-MCNC: 253 MG/DL (ref 0–150)
VLDLC SERPL-MCNC: 45 MG/DL (ref 5–40)
WBC NRBC COR # BLD: 5.43 10*3/MM3 (ref 3.4–10.8)

## 2022-01-10 PROCEDURE — 80061 LIPID PANEL: CPT | Performed by: INTERNAL MEDICINE

## 2022-01-10 PROCEDURE — 36415 COLL VENOUS BLD VENIPUNCTURE: CPT | Performed by: INTERNAL MEDICINE

## 2022-01-10 PROCEDURE — 83036 HEMOGLOBIN GLYCOSYLATED A1C: CPT | Performed by: INTERNAL MEDICINE

## 2022-01-10 PROCEDURE — 80053 COMPREHEN METABOLIC PANEL: CPT | Performed by: INTERNAL MEDICINE

## 2022-01-10 PROCEDURE — 99214 OFFICE O/P EST MOD 30 MIN: CPT | Performed by: INTERNAL MEDICINE

## 2022-01-10 PROCEDURE — 1170F FXNL STATUS ASSESSED: CPT | Performed by: INTERNAL MEDICINE

## 2022-01-10 PROCEDURE — 1159F MED LIST DOCD IN RCRD: CPT | Performed by: INTERNAL MEDICINE

## 2022-01-10 PROCEDURE — G0439 PPPS, SUBSEQ VISIT: HCPCS | Performed by: INTERNAL MEDICINE

## 2022-01-10 PROCEDURE — 85025 COMPLETE CBC W/AUTO DIFF WBC: CPT | Performed by: INTERNAL MEDICINE

## 2022-01-10 RX ORDER — DEXLANSOPRAZOLE 60 MG/1
1 CAPSULE, DELAYED RELEASE ORAL DAILY
Qty: 90 CAPSULE | Refills: 1 | Status: SHIPPED | OUTPATIENT
Start: 2022-01-10 | End: 2022-08-29 | Stop reason: SDUPTHER

## 2022-01-10 RX ORDER — TRAMADOL HYDROCHLORIDE 50 MG/1
50 TABLET ORAL DAILY PRN
Qty: 90 TABLET | Refills: 0 | Status: SHIPPED | OUTPATIENT
Start: 2022-01-10

## 2022-01-10 RX ORDER — FENOFIBRATE 120 MG/1
120 TABLET ORAL DAILY
Qty: 90 TABLET | Refills: 1 | Status: SHIPPED | OUTPATIENT
Start: 2022-01-10

## 2022-01-10 RX ORDER — LEVOCETIRIZINE DIHYDROCHLORIDE 5 MG/1
5 TABLET, FILM COATED ORAL DAILY
Qty: 90 TABLET | Refills: 1 | Status: SHIPPED | OUTPATIENT
Start: 2022-01-10 | End: 2022-04-08

## 2022-01-10 RX ORDER — DULAGLUTIDE 1.5 MG/.5ML
0.5 INJECTION, SOLUTION SUBCUTANEOUS
Qty: 6 ML | Refills: 2 | Status: SHIPPED | OUTPATIENT
Start: 2022-01-10 | End: 2022-12-27

## 2022-01-10 RX ORDER — BLOOD SUGAR DIAGNOSTIC
1 STRIP MISCELLANEOUS AS NEEDED
Qty: 100 EACH | Refills: 5 | Status: SHIPPED | OUTPATIENT
Start: 2022-01-10 | End: 2022-01-11 | Stop reason: SDUPTHER

## 2022-01-10 RX ORDER — AMLODIPINE BESYLATE 5 MG/1
2.5 TABLET ORAL DAILY
Qty: 90 TABLET | Refills: 1 | Status: SHIPPED | OUTPATIENT
Start: 2022-01-10 | End: 2022-02-15

## 2022-01-10 RX ORDER — DULAGLUTIDE 1.5 MG/.5ML
0.5 INJECTION, SOLUTION SUBCUTANEOUS
Qty: 6 ML | Refills: 2 | Status: SHIPPED | OUTPATIENT
Start: 2022-01-10 | End: 2022-01-10

## 2022-01-10 RX ORDER — TRAMADOL HYDROCHLORIDE 50 MG/1
50 TABLET ORAL DAILY PRN
Qty: 90 TABLET | Refills: 0 | Status: SHIPPED | OUTPATIENT
Start: 2022-01-10 | End: 2022-01-10

## 2022-01-10 RX ORDER — FAMOTIDINE 40 MG/1
40 TABLET, FILM COATED ORAL DAILY
Qty: 90 TABLET | Refills: 1 | Status: SHIPPED | OUTPATIENT
Start: 2022-01-10

## 2022-01-10 NOTE — PROGRESS NOTES
Chief Complaint  Diabetes    Subjective    History of Present Illness    Charlette Rai presents for Annual Wellness Visit as well as for follow-up on chronic medical problems including diabetes, hypertension and hyperlipidemia.     DM: well controlled on previous labs, A1C 6%, denies numbness/tingling, vision changes, urinary changes, dizziness, nausea    HTN:  well controlled today, doing well on medication, denies headache, chest pain, dizziness, vision changes    HLD: on statin, tolerating well, denies muscle pain/weakness    Past Medical History:   Diagnosis Date   • Allergic rhinitis    • Anemia    • Arthritis    • Asthma    • Bladder disorder    • Cancer (HCC)    • Cataract    • Chronic kidney disease    • CKD (chronic kidney disease) stage 4, GFR 15-29 ml/min (HCC)    • Condition not found    • Congestive heart failure (CHF) (HCC)    • COPD (chronic obstructive pulmonary disease) (HCC)    • Coronary artery disease    • Deep vein thrombosis (HCC)    • Diabetes mellitus (HCC)    • Disease of thyroid gland    • Essential hypertension    • Gastric ulcer    • GERD (gastroesophageal reflux disease)    • Heart murmur    • Hyperlipemia    • Hyperlipidemia    • Hypertension    • Leukocytopenia    • Limb swelling    • Lumbago    • Lumbar spinal stenosis    • Lung cancer (HCC)    • Lung disease    • Migraine headache    • Multiple joint pain    • Osteopenia    • Reflux esophagitis    • Shortness of breath    • Thyroid disorder    • Thyroid nodule    • Vascular disease    • Vitamin D deficiency      Past Surgical History:   Procedure Laterality Date   • ABDOMINAL SURGERY     • APPENDECTOMY     • CARDIAC CATHETERIZATION  1996   • CARDIAC SURGERY     • CARDIAC SURGERY      fluid drained from heart   • CATARACT EXTRACTION, BILATERAL  2003   • COLONOSCOPY  2014   • ENDOSCOPY  2016 2019   • FEMORAL ARTERY STENT Bilateral    • HYSTERECTOMY     • LUNG BIOPSY  2005    lobectomy upper lung caner   • LUNG VOLUME REDUCTION    "  • OTHER SURGICAL HISTORY      artifical joints/limbs   • OTHER SURGICAL HISTORY      joint surgery   • REPLACEMENT TOTAL KNEE Left 2016     Family History   Problem Relation Age of Onset   • Arthritis Mother    • Arthritis Father    • Cancer Brother    • Diabetes Brother    • Other Brother         blood disease    • Prostate cancer Brother    • Cancer Brother    • Prostate cancer Brother    • Cancer Brother    • Cancer Brother      Social History     Tobacco Use   • Smoking status: Former Smoker     Packs/day: 1.00     Years: 30.00     Pack years: 30.00     Types: Cigarettes     Start date:      Quit date: 2004     Years since quittin.5   • Smokeless tobacco: Never Used   Substance Use Topics   • Alcohol use: Never     Vitals:    01/10/22 1443   Resp: 18   Height: 165.1 cm (65\")     Body mass index is 27.12 kg/m².    Result Review :   The following data was reviewed by: Paloma James MD on 01/10/2022:  CMP    CMP 7/22/21 8/2/21 10/21/21   Glucose 111 (A) 116 (A) 122 (A)   BUN 26 (A) 31 (A) 43 (A)   Creatinine 2.22 (A) 2.10 (A) 2.10 (A)   eGFR Non African Am 21 (A) 22 (A) 22 (A)   Sodium 140 140 142   Potassium 5.2 4.6 4.7   Chloride 102 99 104   Calcium 9.5 9.7 9.3   Albumin 4.00 4.40 4.00   Total Bilirubin  0.3    Alkaline Phosphatase  79    AST (SGOT)  25    ALT (SGPT)  12    (A) Abnormal value            CBC    CBC 5/3/21 7/22/21 10/21/21   WBC 5.27 4.72 5.53   RBC 3.17 (A) 3.11 (A) 3.31 (A)   Hemoglobin 10.2 (A) 9.9 (A) 10.4 (A)   Hematocrit 31.4 (A) 30.5 (A) 31.6 (A)   MCV 99.1 (A) 98.1 (A) 95.5   MCH 32.2 (A) 31.8 31.4   MCHC 32.5 (A) 32.5 32.9   RDW 13.3 12.3 12.9   Platelets 314 335 344   (A) Abnormal value            Lipid Panel    Lipid Panel 3/8/21 5/5/21 8/2/21   Total Cholesterol   164   Total Cholesterol 145 145    Triglycerides 169 (A) 83 182 (A)   HDL Cholesterol 49 61 (A) 48   VLDL Cholesterol 34 17 31   LDL Cholesterol  62 (A) 67 (A) 85   LDL/HDL Ratio   1.66   (A) " Abnormal value       Comments are available for some flowsheets but are not being displayed.               A1C Last 3 Results    HGBA1C Last 3 Results 3/8/21 8/2/21   Hemoglobin A1C 5.9 (A) 6.00 (A)   (A) Abnormal value       Comments are available for some flowsheets but are not being displayed.                        The ABCs of the Annual Wellness Visit  Subsequent Medicare Wellness Visit    Chief Complaint   Patient presents with   • Diabetes      Subjective    History of Present Illness:  Charlette Rai is a 86 y.o. female who presents for a Subsequent Medicare Wellness Visit.    The following portions of the patient's history were reviewed and   updated as appropriate: allergies, current medications, past family history, past medical history, past social history, past surgical history and problem list.    Compared to one year ago, the patient feels her physical   health is the same.    Compared to one year ago, the patient feels her mental   health is the same.    Recent Hospitalizations:  She was not admitted to the hospital during the last year.       Current Medical Providers:  Patient Care Team:  Paloma James MD as PCP - General (Pediatrics)  Regis Mckeon MD as Consulting Physician (Radiation Oncology)    Outpatient Medications Prior to Visit   Medication Sig Dispense Refill   • albuterol sulfate  (90 Base) MCG/ACT inhaler Inhale 2 puffs Every 4 (Four) Hours As Needed for Wheezing.     • amLODIPine (NORVASC) 5 MG tablet Take 1 tablet by mouth Daily. (Patient taking differently: Take 2.5 mg by mouth Daily.) 90 tablet 0   • atorvastatin (LIPITOR) 20 MG tablet Take 20 mg by mouth Every Night.     • cilostazol (PLETAL) 100 MG tablet Take 1 tablet by mouth 2 (two) times a day.     • Dexilant 60 MG capsule TAKE 1 CAPSULE DAILY 90 capsule 1   • Diclofenac Sodium (Voltaren) 1 % gel gel Apply 2 g topically to the appropriate area as directed 4 (Four) Times a Day.     • famotidine (PEPCID)  40 MG tablet Take 1 tablet by mouth Daily. 90 tablet 1   • fenofibrate (FENOGLIDE) 120 MG tablet Take 1 tablet by mouth Daily. 90 tablet 1   • ferrous sulfate 325 (65 FE) MG tablet Take 1 tablet by mouth 2 (two) times a day.     • furosemide (LASIX) 40 MG tablet TAKE 2 TABLETS BY MOUTH ON monday, wednesday and friday and TAKE 1 TABLET ON other DAYS     • levocetirizine (XYZAL) 5 MG tablet Take 1 tablet by mouth Daily. 90 tablet 1   • losartan (COZAAR) 25 MG tablet Take 1 tablet by mouth Daily.     • methylcellulose, Laxative, (CITRUCEL) 500 MG tablet tablet Take 2 tablets by mouth Every 4 (Four) Hours As Needed.     • metoprolol succinate XL (TOPROL-XL) 50 MG 24 hr tablet Take 2 tablets by mouth Daily.     • nitroglycerin (Nitrostat) 0.4 MG SL tablet Nitrostat 0.4 mg sublingual tablet, sublingual place 1 tablet (0.4 mg) by buccal route at the first sign of an attack; no more than 3 tabs are recommended within a 15 minute period.   Active     • OneTouch Ultra test strip AS DIRECTED TO TEST BLOOD SUGAR ONCE A DAY AS NEEDED     • Symbicort 160-4.5 MCG/ACT inhaler Inhale 2 puffs 2 (two) times a day.     • Tradjenta 5 MG tablet tablet TAKE 1 TABLET ONCE DAILY 90 tablet 1   • traMADol (ULTRAM) 50 MG tablet Take 1 tablet by mouth Daily As Needed (once daily as needed). 30 tablet 0   • Trulicity 1.5 MG/0.5ML solution pen-injector INJECT 0.5ML (=1.5 MG) SUBCUTANEOUSLY EVERY 7 DAYS IN THE ABDOMEN, THIGH, OR UPPER ARM ROTATING INJECTION SITES 6 mL 2   • vitamin D (ERGOCALCIFEROL) 1.25 MG (08223 UT) capsule capsule Take 50,000 Units by mouth 1 (One) Time Per Week.       No facility-administered medications prior to visit.       Opioid medication/s are on active medication list.  and I have evaluated her active treatment plan and pain score trends (see table).  There were no vitals filed for this visit.  I have reviewed the chart for potential of high risk medication and harmful drug interactions in the  "elderly.            Aspirin is not on active medication list.  Aspirin use is not indicated based on review of current medical condition/s. Risk of harm outweighs potential benefits.  .    Patient Active Problem List   Diagnosis   • Malignant neoplasm of upper lobe of right lung (HCC)   • Allergic rhinitis   • Rheumatoid arthritis (HCC)   • Asthma   • Chronic obstructive pulmonary disease (HCC)   • Essential hypertension   • GERD (gastroesophageal reflux disease)   • Hyperlipidemia   • Lumbar spinal stenosis   • Migraine   • Monoclonal paraproteinemia   • Osteopenia   • Oxygen dependent   • Peripheral neuropathy   • Stage 4 chronic kidney disease (HCC)   • Type 2 diabetes mellitus without complication (HCC)   • Vitamin D deficiency   • Carotid artery stenosis   • Chronic right-sided thoracic back pain   • Acute pain of left shoulder   • Peripheral vascular disease of lower extremity (HCC)   • Edema   • Ex-smoker   • Peripheral vascular disease (HCC)     Advance Care Planning  Advance Directive is on file.  ACP discussion was held with the patient during this visit. Patient has an advance directive in EMR which is still valid.           Objective    Vitals:    01/10/22 1443   Resp: 18   Height: 165.1 cm (65\")     BMI Readings from Last 1 Encounters:   01/10/22 27.12 kg/m²   BMI is above normal parameters. Recommendations include: educational material    Does the patient have evidence of cognitive impairment? No    Physical Exam  Vitals reviewed.   Constitutional:       Appearance: Normal appearance. She is well-developed.   HENT:      Head: Normocephalic and atraumatic.      Mouth/Throat:      Pharynx: No oropharyngeal exudate.   Eyes:      Conjunctiva/sclera: Conjunctivae normal.      Pupils: Pupils are equal, round, and reactive to light.   Cardiovascular:      Rate and Rhythm: Normal rate and regular rhythm.      Heart sounds: No murmur heard.  No friction rub. No gallop.    Pulmonary:      Effort: Pulmonary " effort is normal.      Breath sounds: Normal breath sounds. No wheezing or rhonchi.   Skin:     General: Skin is warm and dry.   Neurological:      Mental Status: She is alert and oriented to person, place, and time.   Psychiatric:         Mood and Affect: Affect normal.                 HEALTH RISK ASSESSMENT    Smoking Status:  Social History     Tobacco Use   Smoking Status Former Smoker   • Packs/day: 1.00   • Years: 30.00   • Pack years: 30.00   • Types: Cigarettes   • Start date:    • Quit date: 2004   • Years since quittin.5   Smokeless Tobacco Never Used     Alcohol Consumption:  Social History     Substance and Sexual Activity   Alcohol Use Never     Fall Risk Screen:    STEADI Fall Risk Assessment has not been completed.    Depression Screening:  PHQ-2/PHQ-9 Depression Screening 2021   Little interest or pleasure in doing things 0   Feeling down, depressed, or hopeless 0   Trouble falling or staying asleep, or sleeping too much 1   Feeling tired or having little energy 1   Poor appetite or overeating 0   Feeling bad about yourself - or that you are a failure or have let yourself or your family down 0   Trouble concentrating on things, such as reading the newspaper or watching television 0   Moving or speaking so slowly that other people could have noticed. Or the opposite - being so fidgety or restless that you have been moving around a lot more than usual 0   Thoughts that you would be better off dead, or of hurting yourself in some way 0   Total Score 2   If you checked off any problems, how difficult have these problems made it for you to do your work, take care of things at home, or get along with other people? Somewhat difficult       Health Habits and Functional and Cognitive Screening:  No flowsheet data found.    Age-appropriate Screening Schedule:  Refer to the list below for future screening recommendations based on patient's age, sex and/or medical conditions. Orders for these  recommended tests are listed in the plan section. The patient has been provided with a written plan.    Health Maintenance   Topic Date Due   • TDAP/TD VACCINES (1 - Tdap) Never done   • ZOSTER VACCINE (1 of 2) Never done   • DIABETIC EYE EXAM  06/21/2021   • DXA SCAN  10/16/2021   • HEMOGLOBIN A1C  02/02/2022   • URINE MICROALBUMIN  07/22/2022   • LIPID PANEL  08/02/2022   • INFLUENZA VACCINE  Completed              Assessment/Plan   CMS Preventative Services Quick Reference  Risk Factors Identified During Encounter  Chronic Pain   Immunizations Discussed/Encouraged (specific Immunizations; Tdap and Shingrix  Obesity/Overweight   The above risks/problems have been discussed with the patient.  Follow up actions/plans if indicated are seen below in the Assessment/Plan Section.  Pertinent information has been shared with the patient in the After Visit Summary.    Diagnoses and all orders for this visit:    1. Encounter for subsequent annual wellness visit (AWV) in Medicare patient (Primary)    2. Chronic right-sided thoracic back pain  -     Discontinue: traMADol (ULTRAM) 50 MG tablet; Take 1 tablet by mouth Daily As Needed (once daily as needed).  Dispense: 90 tablet; Refill: 0  -     traMADol (ULTRAM) 50 MG tablet; Take 1 tablet by mouth Daily As Needed (once daily as needed).  Dispense: 90 tablet; Refill: 0    3. Type 2 diabetes mellitus without complication, without long-term current use of insulin ([facility])  Assessment & Plan:  Previously well controlled  Checking follow up labs today  Continue current management    Orders:  -     CBC & Differential  -     Comprehensive Metabolic Panel  -     Hemoglobin A1c  -     Lipid Panel  -     Dulaglutide (Trulicity) 1.5 MG/0.5ML solution pen-injector; Inject 1.5 mg under the skin into the appropriate area as directed Every 7 (Seven) Days.  Dispense: 6 mL; Refill: 2  -     linagliptin (Tradjenta) 5 MG tablet tablet; Take 1 tablet by mouth Daily.  Dispense: 90 tablet; Refill:  1  -     OneTouch Ultra test strip; 1 each by Other route As Needed (blood sugar check). Use as instructed  Dispense: 100 each; Refill: 5    4. Mixed hyperlipidemia  -     fenofibrate (FENOGLIDE) 120 MG tablet; Take 1 tablet by mouth Daily.  Dispense: 90 tablet; Refill: 1    5. Essential hypertension  -     amLODIPine (NORVASC) 5 MG tablet; Take 0.5 tablets by mouth Daily.  Dispense: 90 tablet; Refill: 1    Other orders  -     dexlansoprazole (Dexilant) 60 MG capsule; Take 1 capsule by mouth Daily.  Dispense: 90 capsule; Refill: 1  -     famotidine (PEPCID) 40 MG tablet; Take 1 tablet by mouth Daily.  Dispense: 90 tablet; Refill: 1  -     levocetirizine (XYZAL) 5 MG tablet; Take 1 tablet by mouth Daily.  Dispense: 90 tablet; Refill: 1  -     Discontinue: linagliptin (Tradjenta) 5 MG tablet tablet; Take 1 tablet by mouth Daily.  Dispense: 90 tablet; Refill: 1  -     Discontinue: Dulaglutide (Trulicity) 1.5 MG/0.5ML solution pen-injector; Inject 1.5 mg under the skin into the appropriate area as directed Every 7 (Seven) Days.  Dispense: 6 mL; Refill: 2      Follow Up:   Return in about 3 months (around 4/10/2022) for Next scheduled follow up.     An After Visit Summary and PPPS were made available to the patient.

## 2022-01-10 NOTE — PATIENT INSTRUCTIONS
Medicare Wellness  Personal Prevention Plan of Service     Date of Office Visit:  01/10/2022  Encounter Provider:  Paloma James MD  Place of Service:  [facility]  Patient Name: Charlette Rai  :  1935    As part of the Medicare Wellness portion of your visit today, we are providing you with this personalized preventive plan of services (PPPS). This plan is based upon recommendations of the [place] Preventive Services Task Force (USPSTF) and the Advisory Committee on Immunization Practices (ACIP).    This lists the preventive care services that should be considered, and provides dates of when you are due. Items listed as completed are up-to-date and do not require any further intervention.    Health Maintenance   Topic Date Due   • TDAP/TD VACCINES (1 - Tdap) Never done   • ZOSTER VACCINE (1 of 2) Never done   • DIABETIC EYE EXAM  2021   • DXA SCAN  10/16/2021   • HEMOGLOBIN A1C  2022   • COVID-19 Vaccine (4 - Booster for Pfizer series) 2022   • URINE MICROALBUMIN  2022   • LIPID PANEL  2022   • ANNUAL WELLNESS VISIT  01/10/2023   • INFLUENZA VACCINE  Completed   • Pneumococcal Vaccine 65+  Completed       Orders Placed This Encounter   Procedures   • Comprehensive Metabolic Panel     Order Specific Question:   Release to patient     Answer:   Immediate   • Hemoglobin A1c     Order Specific Question:   Release to patient     Answer:   Immediate   • Lipid Panel     Order Specific Question:   Release to patient     Answer:   Immediate   • CBC Auto Differential     Order Specific Question:   Release to patient     Answer:   Immediate   • CBC & Differential     Order Specific Question:   Manual Differential     Answer:   No     Order Specific Question:   Release to patient     Answer:   Immediate       Return in about 3 months (around 4/10/2022) for Next scheduled follow up.

## 2022-01-10 NOTE — TELEPHONE ENCOUNTER
Caller: [id number] - [place], [place] - [address] AT [facility] [address] [facility] & [place] - [phone] [facility] [phone]    Relationship: Pharmacy    Best call back number: [phone]    What is the best time to reach you: ANYTIME    Who are you requesting to speak with (clinical staff, provider,  specific staff member): CLINICAL    Do you know the name of the person who called: KATE    What was the call regarding:       KATE FROM [facility] IS NEEDING TO KNOW HOW OFTEN THE PATIENT NEEDS TO TAKE HER BLOOD SUGAR.          Do you require a callback:YES

## 2022-01-11 DIAGNOSIS — E11.9 TYPE 2 DIABETES MELLITUS WITHOUT COMPLICATION, WITHOUT LONG-TERM CURRENT USE OF INSULIN: ICD-10-CM

## 2022-01-11 RX ORDER — BLOOD SUGAR DIAGNOSTIC
1 STRIP MISCELLANEOUS AS NEEDED
Qty: 100 EACH | Refills: 5 | Status: SHIPPED | OUTPATIENT
Start: 2022-01-11

## 2022-01-20 RX ORDER — LANCETS
EACH MISCELLANEOUS
Qty: 100 EACH | Refills: 12 | Status: SHIPPED | OUTPATIENT
Start: 2022-01-20

## 2022-01-20 RX ORDER — BLOOD SUGAR DIAGNOSTIC
STRIP MISCELLANEOUS
Qty: 100 EACH | Refills: 12 | Status: SHIPPED | OUTPATIENT
Start: 2022-01-20

## 2022-01-24 RX ORDER — METOPROLOL SUCCINATE 50 MG/1
TABLET, EXTENDED RELEASE ORAL
Qty: 135 TABLET | Refills: 1 | Status: SHIPPED | OUTPATIENT
Start: 2022-01-24 | End: 2022-04-14

## 2022-02-08 ENCOUNTER — OFFICE VISIT (OUTPATIENT)
Dept: ORTHOPEDIC SURGERY | Facility: CLINIC | Age: 87
End: 2022-02-08

## 2022-02-08 VITALS — WEIGHT: 164.6 LBS | HEART RATE: 98 BPM | OXYGEN SATURATION: 96 % | BODY MASS INDEX: 27.42 KG/M2 | HEIGHT: 65 IN

## 2022-02-08 DIAGNOSIS — M65.4 DE QUERVAIN'S TENOSYNOVITIS: ICD-10-CM

## 2022-02-08 DIAGNOSIS — M19.041 ARTHRITIS OF RIGHT HAND: ICD-10-CM

## 2022-02-08 DIAGNOSIS — M79.641 RIGHT HAND PAIN: Primary | ICD-10-CM

## 2022-02-08 DIAGNOSIS — M18.11 ARTHRITIS OF CARPOMETACARPAL (CMC) JOINT OF RIGHT THUMB: ICD-10-CM

## 2022-02-08 PROCEDURE — 99213 OFFICE O/P EST LOW 20 MIN: CPT | Performed by: PHYSICIAN ASSISTANT

## 2022-02-08 NOTE — PROGRESS NOTES
"Chief Complaint  Pain of the Right Hand    Subjective          Charlette Rai presents to [facility] for initial evaluation of right hand pain.  Patient points to the right thumb IP, MCP, CMC and radial aspect of the wrist follow-up on right when describing the pain.  No known injury.  Has a history of arthritis.  Takes Tylenol as needed for pain.  She was told she had carpal tunnel bilaterally previously but she has not had any surgical intervention.  Denies numbness or tingling today.  States she has difficulty moving the thumb.  She states the pain in the thumb shoots all the way down into the wrist and up the forearm.  States she has a decreased  strength.    Objective   No Known Allergies    Vital Signs:   Pulse 98   Ht 165.1 cm (65\")   Wt 74.7 kg (164 lb 9.6 oz)   SpO2 96%   BMI 27.39 kg/m²       Physical Exam  Constitutional:       Appearance: Normal appearance. Patient is well-developed and normal weight.   HENT:      Head: Normocephalic.      Right Ear: Hearing and external ear normal.      Left Ear: Hearing and external ear normal.      Nose: Nose normal.   Eyes:      Conjunctiva/sclera: Conjunctivae normal.   Cardiovascular:      Rate and Rhythm: Normal rate.   Pulmonary:      Effort: Pulmonary effort is normal.      Breath sounds: No wheezing or rales.   Abdominal:      Palpations: Abdomen is soft.      Tenderness: There is no abdominal tenderness.   Musculoskeletal:      Cervical back: Normal range of motion.   Skin:     Findings: No rash.   Neurological:      Mental Status: Patient is alert and oriented to person, place, and time.   Psychiatric:         Mood and Affect: Mood and affect normal.         Judgment: Judgment normal.     Ortho Exam  Right hand: Skin intact, mild swelling of the thumb and wrist.  Sensations intact, good two-point discrimination, radial and ulnar pulse 2+, good range of motion of the wrist, positive Finkelstein, negative Tinel at the " wrist.  Pain with palpation to the IP and MCP and CMC joints of the right thumb.  Result Review :            Imaging Results (Most Recent)     Procedure Component Value Units Date/Time    XR Hand 2 View Right [[id number]] Resulted: 02/08/22 1345     Updated: 02/08/22 1346    Narrative:      X-Ray Report:  Study: X-rays ordered, taken in the office, and reviewed today  Site: Right hand xray  Indication: Pain  View: AP and Lateral view(s)  Findings: Evidence for IP, MCP, CMC arthritis throughout the digits and   hand.  No acute osseous or soft tissue abnormalities are noted.  Prior studies available for comparison: yes                   Assessment and Plan    Problem List Items Addressed This Visit        Musculoskeletal and Injuries    De Quervain's tenosynovitis    Arthritis of carpometacarpal (CMC) joint of right thumb    Current Assessment & Plan     X-rays taken and reviewed.  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  She also exhibits symptoms of de Quervain's tenosynovitis.  She will begin using Voltaren gel and taking Tylenol orally.  Home exercises provided.  The membrane brace provided for activity.  Follow-up in 3 to 4 weeks for recheck, if no improvement may consider CMC versus de Quervain's tenosynovitis with Dr. Jimenez.         Arthritis of right hand      Other Visit Diagnoses     Right hand pain    -  Primary    Relevant Orders    XR Hand 2 View Right (Completed)          Follow Up   Return in about 4 weeks (around 3/8/2022) for Recheck.  Patient Instructions   X-rays taken and reviewed.  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  She also exhibits symptoms of de Quervain's tenosynovitis.  She will begin using Voltaren gel and taking Tylenol orally.  Home exercises provided.  The membrane brace provided for activity.  Follow-up in 3 to 4 weeks for recheck, if no improvement may consider CMC versus de Quervain's tenosynovitis with   Tony.    Patient was given instructions and counseling regarding her condition or for health maintenance advice. Please see specific information pulled into the AVS if appropriate.

## 2022-02-08 NOTE — ASSESSMENT & PLAN NOTE
X-rays taken and reviewed.  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  She also exhibits symptoms of de Quervain's tenosynovitis.  She will begin using Voltaren gel and taking Tylenol orally.  Home exercises provided.  The membrane brace provided for activity.  Follow-up in 3 to 4 weeks for recheck, if no improvement may consider CMC versus de Quervain's tenosynovitis with Dr. Jimenez.

## 2022-02-15 DIAGNOSIS — I10 ESSENTIAL HYPERTENSION: ICD-10-CM

## 2022-02-15 RX ORDER — AMLODIPINE BESYLATE 5 MG/1
TABLET ORAL
Qty: 90 TABLET | Refills: 1 | Status: SHIPPED | OUTPATIENT
Start: 2022-02-15 | End: 2022-12-13

## 2022-02-28 ENCOUNTER — LAB (OUTPATIENT)
Dept: LAB | Facility: HOSPITAL | Age: 87
End: 2022-02-28

## 2022-02-28 ENCOUNTER — TRANSCRIBE ORDERS (OUTPATIENT)
Dept: LAB | Facility: HOSPITAL | Age: 87
End: 2022-02-28

## 2022-02-28 DIAGNOSIS — N18.4 CHRONIC KIDNEY DISEASE, STAGE IV (SEVERE): ICD-10-CM

## 2022-02-28 DIAGNOSIS — N18.4 CHRONIC KIDNEY DISEASE, STAGE IV (SEVERE): Primary | ICD-10-CM

## 2022-02-28 LAB
25(OH)D3 SERPL-MCNC: 17.9 NG/ML
ALBUMIN SERPL-MCNC: 4.3 G/DL (ref 3.5–5.2)
ANION GAP SERPL CALCULATED.3IONS-SCNC: 10 MMOL/L (ref 5–15)
BASOPHILS # BLD AUTO: 0.06 10*3/MM3 (ref 0–0.2)
BASOPHILS NFR BLD AUTO: 0.6 % (ref 0–1.5)
BILIRUB UR QL STRIP: NEGATIVE
BUN SERPL-MCNC: 52 MG/DL (ref 8–23)
BUN/CREAT SERPL: 19.7 (ref 7–25)
CALCIUM SPEC-SCNC: 9.6 MG/DL (ref 8.6–10.5)
CHLORIDE SERPL-SCNC: 105 MMOL/L (ref 98–107)
CLARITY UR: CLEAR
CO2 SERPL-SCNC: 27 MMOL/L (ref 22–29)
COLOR UR: YELLOW
CREAT SERPL-MCNC: 2.64 MG/DL (ref 0.57–1)
CREAT UR-MCNC: 60.7 MG/DL
DEPRECATED RDW RBC AUTO: 46.1 FL (ref 37–54)
EGFRCR SERPLBLD CKD-EPI 2021: 17.4 ML/MIN/1.73
EOSINOPHIL # BLD AUTO: 0.12 10*3/MM3 (ref 0–0.4)
EOSINOPHIL NFR BLD AUTO: 1.2 % (ref 0.3–6.2)
ERYTHROCYTE [DISTWIDTH] IN BLOOD BY AUTOMATED COUNT: 12.9 % (ref 12.3–15.4)
GLUCOSE SERPL-MCNC: 123 MG/DL (ref 65–99)
GLUCOSE UR STRIP-MCNC: NEGATIVE MG/DL
HCT VFR BLD AUTO: 32.2 % (ref 34–46.6)
HGB BLD-MCNC: 10.5 G/DL (ref 12–15.9)
HGB UR QL STRIP.AUTO: NEGATIVE
IMM GRANULOCYTES # BLD AUTO: 0.05 10*3/MM3 (ref 0–0.05)
IMM GRANULOCYTES NFR BLD AUTO: 0.5 % (ref 0–0.5)
KETONES UR QL STRIP: NEGATIVE
LEUKOCYTE ESTERASE UR QL STRIP.AUTO: NEGATIVE
LYMPHOCYTES # BLD AUTO: 0.67 10*3/MM3 (ref 0.7–3.1)
LYMPHOCYTES NFR BLD AUTO: 6.8 % (ref 19.6–45.3)
MCH RBC QN AUTO: 32.6 PG (ref 26.6–33)
MCHC RBC AUTO-ENTMCNC: 32.6 G/DL (ref 31.5–35.7)
MCV RBC AUTO: 100 FL (ref 79–97)
MONOCYTES # BLD AUTO: 1.01 10*3/MM3 (ref 0.1–0.9)
MONOCYTES NFR BLD AUTO: 10.3 % (ref 5–12)
NEUTROPHILS NFR BLD AUTO: 7.89 10*3/MM3 (ref 1.7–7)
NEUTROPHILS NFR BLD AUTO: 80.6 % (ref 42.7–76)
NITRITE UR QL STRIP: NEGATIVE
NRBC BLD AUTO-RTO: 0 /100 WBC (ref 0–0.2)
PH UR STRIP.AUTO: 6 [PH] (ref 5–8)
PHOSPHATE SERPL-MCNC: 3.6 MG/DL (ref 2.5–4.5)
PLATELET # BLD AUTO: 348 10*3/MM3 (ref 140–450)
PMV BLD AUTO: 9.9 FL (ref 6–12)
POTASSIUM SERPL-SCNC: 4.7 MMOL/L (ref 3.5–5.2)
PROT ?TM UR-MCNC: 6 MG/DL
PROT UR QL STRIP: NEGATIVE
PROT/CREAT UR: 0.1 MG/G{CREAT}
PTH-INTACT SERPL-MCNC: 44.3 PG/ML (ref 15–65)
RBC # BLD AUTO: 3.22 10*6/MM3 (ref 3.77–5.28)
SODIUM SERPL-SCNC: 142 MMOL/L (ref 136–145)
SP GR UR STRIP: 1.02 (ref 1–1.03)
UROBILINOGEN UR QL STRIP: NORMAL
WBC NRBC COR # BLD: 9.8 10*3/MM3 (ref 3.4–10.8)

## 2022-02-28 PROCEDURE — 82570 ASSAY OF URINE CREATININE: CPT

## 2022-02-28 PROCEDURE — 81003 URINALYSIS AUTO W/O SCOPE: CPT

## 2022-02-28 PROCEDURE — 83970 ASSAY OF PARATHORMONE: CPT

## 2022-02-28 PROCEDURE — 85025 COMPLETE CBC W/AUTO DIFF WBC: CPT

## 2022-02-28 PROCEDURE — 80069 RENAL FUNCTION PANEL: CPT

## 2022-02-28 PROCEDURE — 36415 COLL VENOUS BLD VENIPUNCTURE: CPT

## 2022-02-28 PROCEDURE — 84156 ASSAY OF PROTEIN URINE: CPT

## 2022-02-28 PROCEDURE — 82306 VITAMIN D 25 HYDROXY: CPT

## 2022-03-04 ENCOUNTER — TRANSCRIBE ORDERS (OUTPATIENT)
Dept: VASCULAR SURGERY | Facility: HOSPITAL | Age: 87
End: 2022-03-04

## 2022-03-04 DIAGNOSIS — N18.4 STAGE 4 CHRONIC KIDNEY DISEASE: Primary | ICD-10-CM

## 2022-03-08 ENCOUNTER — OFFICE VISIT (OUTPATIENT)
Dept: ORTHOPEDIC SURGERY | Facility: CLINIC | Age: 87
End: 2022-03-08

## 2022-03-08 VITALS — HEART RATE: 93 BPM | OXYGEN SATURATION: 96 % | BODY MASS INDEX: 27.32 KG/M2 | WEIGHT: 164 LBS | HEIGHT: 65 IN

## 2022-03-08 DIAGNOSIS — M19.041 ARTHRITIS OF RIGHT HAND: Primary | ICD-10-CM

## 2022-03-08 DIAGNOSIS — M65.4 DE QUERVAIN'S TENOSYNOVITIS: ICD-10-CM

## 2022-03-08 PROCEDURE — 99214 OFFICE O/P EST MOD 30 MIN: CPT | Performed by: PHYSICIAN ASSISTANT

## 2022-03-08 RX ORDER — METHYLPREDNISOLONE 4 MG/1
TABLET ORAL
Qty: 21 TABLET | Refills: 0 | Status: SHIPPED | OUTPATIENT
Start: 2022-03-08 | End: 2022-04-08

## 2022-03-08 NOTE — PROGRESS NOTES
"Chief Complaint  Pain of the Right Hand    Subjective          Charlette Rai presents to [facility] for follow-up on right hand pain.  Pain extends from the IP joint down into the radial aspect of the wrist.  She states the CMC joint is the most prominent area of pain followed by the pain around the distal radius soft tissues.  X-ray revealed CMC joint arthritis and patient also complains of right radial wrist pain.  At last visit she was given home exercises for CMC arthritis and de Quervain's tenosynovitis.  She has been using a thumb brace as tolerated.  Voltaren gel and Tylenol are being used for pain relief.  Denies any injury.  She is right-hand dominant.  Has not noted much improvement in pain over the past few weeks.    Objective   No Known Allergies    Vital Signs:   Pulse 93   Ht 165.1 cm (65\")   Wt 74.4 kg (164 lb)   SpO2 96%   BMI 27.29 kg/m²       Physical Exam  Constitutional:       Appearance: Normal appearance. Patient is well-developed and normal weight.   HENT:      Head: Normocephalic.      Right Ear: Hearing and external ear normal.      Left Ear: Hearing and external ear normal.      Nose: Nose normal.   Eyes:      Conjunctiva/sclera: Conjunctivae normal.   Cardiovascular:      Rate and Rhythm: Normal rate.   Pulmonary:      Effort: Pulmonary effort is normal.      Breath sounds: No wheezing or rales.   Abdominal:      Palpations: Abdomen is soft.      Tenderness: There is no abdominal tenderness.   Musculoskeletal:      Cervical back: Normal range of motion.   Skin:     Findings: No rash.   Neurological:      Mental Status: Patient is alert and oriented to person, place, and time.   Psychiatric:         Mood and Affect: Mood and affect normal.         Judgment: Judgment normal.     Ortho Exam  Right hand: Skin intact, swelling of the IP MCP and CMC regions of the right thumb.  Patient is also tender along the soft tissues through the radial aspect of the " wrist.  Negative Tinel's, positive Finkelstein, sensation intact, good two-point discrimination, able to make a tight fist, good thumb opposition although painful, cap refill less than 2 seconds, radial and ulnar pulses 2+.  Result Review :            Imaging Results (Most Recent)     None                Assessment and Plan    Problem List Items Addressed This Visit        Musculoskeletal and Injuries    De Quervain's tenosynovitis    Arthritis of right hand - Primary    Current Assessment & Plan     Suspect patient is still suffering from CMC joint arthritis as well as de Quervain's tenosynovitis.  Treatment options were discussed.  She elected to try Medrol Dosepak.  Continue home exercises.  Bracing as tolerated with activity.  Rest ice elevate.  Avoid aggravating activities.  Voltaren gel and Tylenol are recommended for pain.  Plan to follow-up in about a month, if no improvement patient may consider CMC injection.                 Follow Up   Return in about 4 weeks (around 4/5/2022) for Recheck.  Patient Instructions   Suspect patient is still suffering from CMC joint arthritis as well as de Quervain's tenosynovitis.  Treatment options were discussed.  She elected to try Medrol Dosepak.  Continue home exercises.  Bracing as tolerated with activity.  Rest ice elevate.  Avoid aggravating activities.  Voltaren gel and Tylenol are recommended for pain.  Plan to follow-up in about a month, if no improvement patient may consider CMC injection.    Patient was given instructions and counseling regarding her condition or for health maintenance advice. Please see specific information pulled into the AVS if appropriate.

## 2022-03-08 NOTE — PATIENT INSTRUCTIONS
Suspect patient is still suffering from CMC joint arthritis as well as de Quervain's tenosynovitis.  Treatment options were discussed.  She elected to try Medrol Dosepak.  Continue home exercises.  Bracing as tolerated with activity.  Rest ice elevate.  Avoid aggravating activities.  Voltaren gel and Tylenol are recommended for pain.  Plan to follow-up in about a month, if no improvement patient may consider CMC injection.

## 2022-03-30 ENCOUNTER — OFFICE VISIT (OUTPATIENT)
Dept: VASCULAR SURGERY | Facility: HOSPITAL | Age: 87
End: 2022-03-30

## 2022-03-30 ENCOUNTER — HOSPITAL ENCOUNTER (OUTPATIENT)
Dept: CARDIOLOGY | Facility: HOSPITAL | Age: 87
Discharge: HOME | End: 2022-03-30

## 2022-03-30 VITALS
RESPIRATION RATE: 16 BRPM | DIASTOLIC BLOOD PRESSURE: 72 MMHG | SYSTOLIC BLOOD PRESSURE: 138 MMHG | OXYGEN SATURATION: 98 % | TEMPERATURE: 98 F | HEART RATE: 92 BPM

## 2022-03-30 DIAGNOSIS — N18.4 STAGE 4 CHRONIC KIDNEY DISEASE: ICD-10-CM

## 2022-03-30 DIAGNOSIS — N18.9 CHRONIC KIDNEY DISEASE, UNSPECIFIED CKD STAGE: Primary | ICD-10-CM

## 2022-03-30 LAB
BH CV VAS MEAS BASILIC ANTECUBITAL FOSSA LEFT: 0.28 CM
BH CV VAS MEAS BASILIC ANTECUBITAL FOSSA RIGHT: 0.27 CM
BH CV VAS MEAS BASILIC FOREARM LEFT - DIST: 0.17 CM
BH CV VAS MEAS BASILIC FOREARM LEFT - MID: 0.25 CM
BH CV VAS MEAS BASILIC FOREARM LEFT - PROX: 0.27 CM
BH CV VAS MEAS BASILIC FOREARM RIGHT - DIST: 0.3 CM
BH CV VAS MEAS BASILIC FOREARM RIGHT - MID: 0.36 CM
BH CV VAS MEAS BASILIC FOREARM RIGHT - PROX: 0.39 CM
BH CV VAS MEAS BASILIC UPPER ARM LEFT - DIST: 0.51 CM
BH CV VAS MEAS BASILIC UPPER ARM LEFT - MID: 0.43 CM
BH CV VAS MEAS BASILIC UPPER ARM LEFT - PROX: 0.42 CM
BH CV VAS MEAS BASILIC UPPER ARM RIGHT - DIST: 0.32 CM
BH CV VAS MEAS BASILIC UPPER ARM RIGHT - MID: 0.36 CM
BH CV VAS MEAS BASILIC UPPER ARM RIGHT - PROX: 0.37 CM
BH CV VAS MEAS CEPHALIC ANTECUBITAL FOSSA LEFT: 0.19 CM
BH CV VAS MEAS CEPHALIC ANTECUBITAL FOSSA RIGHT: 0.23 CM
BH CV VAS MEAS CEPHALIC FOREARM LEFT - DIST: 0.11 CM
BH CV VAS MEAS CEPHALIC FOREARM LEFT - MID: 0.21 CM
BH CV VAS MEAS CEPHALIC FOREARM LEFT - PROX: 0.29 CM
BH CV VAS MEAS CEPHALIC FOREARM RIGHT - MID: 0.12 CM
BH CV VAS MEAS CEPHALIC FOREARM RIGHT - PROX: 0.27 CM
BH CV VAS MEAS CEPHALIC UPPER ARM LEFT - DIST: 0.08 CM
BH CV VAS MEAS CEPHALIC UPPER ARM LEFT - MID: 0.26 CM
BH CV VAS MEAS CEPHALIC UPPER ARM LEFT - PROX: 0.32 CM
BH CV VAS MEAS CEPHALIC UPPER ARM RIGHT - DIST: 0.33 CM
BH CV VAS MEAS CEPHALIC UPPER ARM RIGHT - MID: 0.4 CM
BH CV VAS MEAS CEPHALIC UPPER ARM RIGHT - PROX: 0.48 CM
BH CV VAS MEAS RADIAL UPPER ARM LEFT - PROX: 0.28 CM
BH CV VAS MEAS RADIAL UPPER ARM RIGHT - PROX: 0.26 CM
MAXIMAL PREDICTED HEART RATE: 135 BPM
STRESS TARGET HR: 115 BPM
UPPER ARTERIAL LEFT ARM BRACHIAL LENGTH: 0.37 CM
UPPER ARTERIAL RIGHT ARM BRACHIAL LENGTH: 0.47 CM

## 2022-03-30 PROCEDURE — 93985 DUP-SCAN HEMO COMPL BI STD: CPT

## 2022-03-30 PROCEDURE — 99213 OFFICE O/P EST LOW 20 MIN: CPT | Performed by: SURGERY

## 2022-03-30 PROCEDURE — 93985 DUP-SCAN HEMO COMPL BI STD: CPT | Performed by: SURGERY

## 2022-03-30 NOTE — PROGRESS NOTES
[facility]   Follow up Office    Patient Name: Charlette Rai  : 1935  MRN: [id number]  Primary Care Physician:  Paloma James MD      Subjective   Subjective     HPI:    Charlette Rai is a 87 y.o. female who comes to the office to discuss about permanent access for dialysis.  She is right-handed.      Objective     Vitals:   Temp:  [98 °F (36.7 °C)] 98 °F (36.7 °C)  Heart Rate:  [92] 92  Resp:  [16] 16  BP: (138)/(72) 138/72    Physical Exam      General: Alert, no acute distress.  Extremities: Symmetric.  No obvious pain on examination.  Pulses: +2 left radial    Diagnostic studies: The vein mapping ultrasound demonstrates a left cephalic vein to be suboptimal in caliber for permanent access for hemodialysis.  The left upper arm basilic vein appeared satisfactory.    Assessment/Plan   Assessment / Plan     Diagnoses and all orders for this visit:    1. Chronic kidney disease, unspecified CKD stage (Primary)  -     Case Request; Standing  -     COVID PRE-OP / PRE-PROCEDURE SCREENING ORDER (NO ISOLATION) - Swab, Nasopharynx; Future  -     CBC & Differential; Future  -     Basic Metabolic Panel; Future  -     Case Request    Other orders  -     Follow Anesthesia Guidelines / Protocol; Future  -     Obtain Informed Consent; Future  -     Provide NPO Instructions to Patient; Future  -     Chlorhexidine Skin Prep; Future       Assessment/Plan:   Mrs. Rai is in need for permanent access for hemodialysis.  The plan is for a left basilic vein transposition.  I have discussed with her in detail the mechanics of the procedure, the indications, benefits, risks, alternatives, as well as potential complications to include but not limited to infection, bleeding, reoperation, failure of the fistula to develop.  She appears to understand and desires to proceed.        Electronically signed by Wan Barksdale MD, 22, 2:52 PM EDT.

## 2022-04-08 PROCEDURE — U0004 COV-19 TEST NON-CDC HGH THRU: HCPCS | Performed by: FAMILY MEDICINE

## 2022-04-09 ENCOUNTER — TELEPHONE (OUTPATIENT)
Dept: URGENT CARE | Facility: CLINIC | Age: 87
End: 2022-04-09

## 2022-04-09 NOTE — TELEPHONE ENCOUNTER
Did talk with pt. Notified her that her CXR is stable. She stated that she is not feeling worse. She was encouraged to take her antibiotics as directed, to use her home O2 for any increase in her SOA, and to try to take in a more fluids. Ms. Rai was clear minded this AM as she was last night. I again encouraged her to go to the ED if she worsens or does not improve. Cautioned her that her O2 is lower than is her norm, and that her heart rate is higher than her norm. She voices good understanding.

## 2022-04-14 ENCOUNTER — PRE-ADMISSION TESTING (OUTPATIENT)
Dept: PREADMISSION TESTING | Facility: HOSPITAL | Age: 87
End: 2022-04-14

## 2022-04-14 VITALS
OXYGEN SATURATION: 97 % | WEIGHT: 159.83 LBS | BODY MASS INDEX: 26.63 KG/M2 | DIASTOLIC BLOOD PRESSURE: 62 MMHG | SYSTOLIC BLOOD PRESSURE: 160 MMHG | RESPIRATION RATE: 22 BRPM | TEMPERATURE: 97.8 F | HEART RATE: 96 BPM | HEIGHT: 65 IN

## 2022-04-14 DIAGNOSIS — N18.9 CHRONIC KIDNEY DISEASE, UNSPECIFIED CKD STAGE: ICD-10-CM

## 2022-04-14 LAB
ANION GAP SERPL CALCULATED.3IONS-SCNC: 14.4 MMOL/L (ref 5–15)
BASOPHILS # BLD AUTO: 0.05 10*3/MM3 (ref 0–0.2)
BASOPHILS NFR BLD AUTO: 0.7 % (ref 0–1.5)
BUN SERPL-MCNC: 47 MG/DL (ref 8–23)
BUN/CREAT SERPL: 17.1 (ref 7–25)
CALCIUM SPEC-SCNC: 9.9 MG/DL (ref 8.6–10.5)
CHLORIDE SERPL-SCNC: 103 MMOL/L (ref 98–107)
CO2 SERPL-SCNC: 25.6 MMOL/L (ref 22–29)
CREAT SERPL-MCNC: 2.75 MG/DL (ref 0.57–1)
DEPRECATED RDW RBC AUTO: 49.7 FL (ref 37–54)
EGFRCR SERPLBLD CKD-EPI 2021: 16.4 ML/MIN/1.73
EOSINOPHIL # BLD AUTO: 0.11 10*3/MM3 (ref 0–0.4)
EOSINOPHIL NFR BLD AUTO: 1.5 % (ref 0.3–6.2)
ERYTHROCYTE [DISTWIDTH] IN BLOOD BY AUTOMATED COUNT: 13.3 % (ref 12.3–15.4)
GLUCOSE SERPL-MCNC: 117 MG/DL (ref 65–99)
HCT VFR BLD AUTO: 32.9 % (ref 34–46.6)
HGB BLD-MCNC: 10.5 G/DL (ref 12–15.9)
IMM GRANULOCYTES # BLD AUTO: 0.05 10*3/MM3 (ref 0–0.05)
IMM GRANULOCYTES NFR BLD AUTO: 0.7 % (ref 0–0.5)
LYMPHOCYTES # BLD AUTO: 0.67 10*3/MM3 (ref 0.7–3.1)
LYMPHOCYTES NFR BLD AUTO: 9.1 % (ref 19.6–45.3)
MCH RBC QN AUTO: 32.4 PG (ref 26.6–33)
MCHC RBC AUTO-ENTMCNC: 31.9 G/DL (ref 31.5–35.7)
MCV RBC AUTO: 101.5 FL (ref 79–97)
MONOCYTES # BLD AUTO: 0.87 10*3/MM3 (ref 0.1–0.9)
MONOCYTES NFR BLD AUTO: 11.8 % (ref 5–12)
NEUTROPHILS NFR BLD AUTO: 5.62 10*3/MM3 (ref 1.7–7)
NEUTROPHILS NFR BLD AUTO: 76.2 % (ref 42.7–76)
NRBC BLD AUTO-RTO: 0 /100 WBC (ref 0–0.2)
PLATELET # BLD AUTO: 429 10*3/MM3 (ref 140–450)
PMV BLD AUTO: 8.7 FL (ref 6–12)
POTASSIUM SERPL-SCNC: 3.9 MMOL/L (ref 3.5–5.2)
RBC # BLD AUTO: 3.24 10*6/MM3 (ref 3.77–5.28)
SODIUM SERPL-SCNC: 143 MMOL/L (ref 136–145)
WBC NRBC COR # BLD: 7.37 10*3/MM3 (ref 3.4–10.8)

## 2022-04-14 PROCEDURE — 85025 COMPLETE CBC W/AUTO DIFF WBC: CPT

## 2022-04-14 PROCEDURE — 36415 COLL VENOUS BLD VENIPUNCTURE: CPT

## 2022-04-14 PROCEDURE — 80048 BASIC METABOLIC PNL TOTAL CA: CPT

## 2022-04-14 RX ORDER — FUROSEMIDE 40 MG/1
80 TABLET ORAL
COMMUNITY

## 2022-04-14 RX ORDER — FUROSEMIDE 40 MG/1
40 TABLET ORAL
COMMUNITY
End: 2022-10-07

## 2022-04-14 NOTE — NURSING NOTE
SPOKE WITH MANSOOR AT [facility] AND SHE STATED THAT D/T RECENT UC VISIT WITH COUGH/SOA AND ON ATB WOULD BE BEST TO HOLD OFF ON SURGERY UNTIL SOMETIME IN MAY. MANSOOR TO CONTACT PT IN AM AND GET DOS CHANGED. SHE ALSO ADVISED THAT DR COLBERT WAS OK WITH PT CONTINUING PLETAL.

## 2022-04-14 NOTE — DISCHARGE INSTRUCTIONS
IMPORTANT INSTRUCTIONS - PRE-ADMISSION TESTING  DO NOT EAT OR CHEW anything after midnight the night before your procedure.    You may have CLEAR liquids up to __2____ hours prior to ARRIVAL time.   Take the following medications the morning of your procedure with JUST A SIP OF WATER:  ___ USE INHALERS AND BRING WITH YOU_, FERROUS SULFATE, AMLODIPINE, CEFDNIR, LEVOCETIRIZINE, DEXLANSOPRAZOLE, FAMOTIDINE, FENOFIBRATE___________________________________________________________________________________________________________________________________________________________________________________    DO NOT BRING your medications to the hospital with you, UNLESS something has changed since your PRE-Admission Testing appointment.  STARTING TODAY Hold all vitamins, supplements, and NSAIDS (Non- steroidal anti-inflammatory meds) for one week prior to surgery (you MAY take Tylenol or Acetaminophen).  If you are diabetic, check your blood sugar the morning of your procedure. If it is less than 70 or if you are feeling symptomatic, call the following number for further instructions: [phone]  Use your inhalers/nebulizers as usual, the morning of your procedure. BRING YOUR INHALERS with you.   Bring your CPAP or BIPAP to hospital, ONLY IF YOU WILL BE SPENDING THE NIGHT.   Make sure you have a ride home and have someone who will stay with you the day of your procedure after you go home.  If you have any questions, please call your Pre-Admission Testing Nurse, ___LITO_____________ at [phone]___.   Per anesthesia request, do not smoke for 24 hours before your procedure or as instructed by your surgeon.     WILL CALL DAY BEFORE PROCEDURE AND GIVE OFFICIAL ARRIVAL TIME  COME TO SAME AREA HAD PAT APPT ELEVATOR A 3RD FLOOR  CASH OR CARD FOR MED TO BED AT DISCHARGE IF INDICATED  REFER TO BATHING SHEET FOR BATHING INSTRUCTIONS. NO NAIL POLISH UPPER OR LOWER EXT. NO JEWELRY OF ANY TYPE DAY OF PROCEDURE    REFER  TO CLEAR LIQUID DIET FOR APPROVED CLEAR LIQUIDS

## 2022-04-14 NOTE — NURSING NOTE
MESSAGE SENT TO MANSOOR IN REGARDS IF OK TO CONTINUE PLETAL. ALSO ADVISED HER THAT PT WAS SEEN IN URGENT CARE ON 4/8/22 FOR RESP COUGH AND SOA. CURRENTLY ON ATB AND DENIES ANY ISSUES HAS FINISHED UP PRED GAY AND ALSO HGB 10.5 WITH HER PREVIOUS HGB BEING 10.5

## 2022-04-19 ENCOUNTER — ANESTHESIA EVENT (OUTPATIENT)
Dept: PERIOP | Facility: HOSPITAL | Age: 87
End: 2022-04-19

## 2022-04-19 RX ORDER — METOPROLOL SUCCINATE 50 MG/1
TABLET, EXTENDED RELEASE ORAL
Qty: 135 TABLET | Refills: 1 | Status: SHIPPED | OUTPATIENT
Start: 2022-04-19 | End: 2023-03-21

## 2022-05-09 PROCEDURE — S0260 H&P FOR SURGERY: HCPCS | Performed by: SURGERY

## 2022-05-09 NOTE — H&P
[facility]   HISTORY AND PHYSICAL    Patient Name: Charlette Rai  : 1935  MRN: [id number]  Primary Care Physician:  Paloma James MD  Date of admission: (Not on file)    Subjective   Subjective     Chief Complaint: Need for permanent access for hemodialysis    HPI:    Charlette Rai is a 87 y.o. female in need for permanent access for hemodialysis    Review of Systems    Non contributory except for the History of Present Illness    Personal History     Past Medical History:   Diagnosis Date   • Allergic rhinitis    • Anemia    • Arthritis    • Asthma     USE INHALERS AND NEBULIZERS   • Bladder disorder    • Cancer (HCC)     LEFT LUNG CANCER  SURGERY AND CHEMO DONE  AND CURRENTLY   RIGHT LUNG CANCER HAS ONLY RECEIVED RADIATION THUS FAR   • Chronic kidney disease     stage 3   • CKD (chronic kidney disease) stage 4, GFR 15-29 ml/min ([facility])    • Condition not found     ulcer   • Congestive heart failure (CHF) ([facility])     FOLLOWED BY DR AQUINO. DENIES CP BUT DOES HAVE SOA CHRONIC ISSUE COPD/LUNG CANCER   • COPD (chronic obstructive pulmonary disease) ([facility])     FOLLOWED BY DR MARIAJOSE BAILEY   • Coronary artery disease     DENIES CP BUT DOES GET SOA MOST OF THE TIME WITH EXERTION BUT OCC AT REST CHRONIC ISSUE COPD/LUNG CANCER   • Deep vein thrombosis ([facility])    • Diabetes mellitus ([facility])     DOES NOT CHECK BS DAILY   • Disease of thyroid gland     HYPOTHYROIDISM   • Essential hypertension    • Gastric ulcer    • GERD (gastroesophageal reflux disease)    • Heart murmur    • History of transfusion     NO ISSUES POST TRANSFUSION WAS MANY YEARS AGO   • Hyperlipemia    • Leukocytopenia     FOLLOWED BY DR MIR BAILEY   • Limb swelling    • Lumbago    • Lumbar spinal stenosis    • Lung cancer (HCC)    • Migraine headache    • Multiple joint pain    • Osteopenia    • PONV (postoperative nausea and vomiting)    • Reflux esophagitis    • Shortness of breath    • Thyroid nodule     HAS ULTRASOUND YEARLY BEING  MONITORED   • Vascular disease    • Vitamin D deficiency        Past Surgical History:   Procedure Laterality Date   • ABDOMINAL SURGERY     • APPENDECTOMY     • CARDIAC CATHETERIZATION  1996   • CARDIAC SURGERY     • CARDIAC SURGERY      fluid drained from heart   • CATARACT EXTRACTION, BILATERAL  2003   • COLONOSCOPY  2014   • ENDOSCOPY  2016 2019   • FEMORAL ARTERY STENT Bilateral    • HYSTERECTOMY     • LUNG BIOPSY Left 2005    lobectomy upper lung caner   • LUNG VOLUME REDUCTION     • OTHER SURGICAL HISTORY      artifical joints/limbs   • REPLACEMENT TOTAL KNEE Left 2016       Family History: family history includes Arthritis in her father and mother; Cancer in her brother, brother, brother, and brother; Diabetes in her brother; Other in her brother; Prostate cancer in her brother and brother. Otherwise pertinent FHx was reviewed and not pertinent to current issue.    Social History:  reports that she quit smoking about 17 years ago. Her smoking use included cigarettes. She started smoking about 70 years ago. She has a 30.00 pack-year smoking history. She has never used smokeless tobacco. She reports that she does not drink alcohol and does not use drugs.    Home Medications:  No current facility-administered medications on file prior to encounter.     Current Outpatient Medications on File Prior to Encounter   Medication Sig   • albuterol sulfate  (90 Base) MCG/ACT inhaler Inhale 2 puffs Every 4 (Four) Hours As Needed for Wheezing.   • amLODIPine (NORVASC) 5 MG tablet TAKE 1 TABLET DAILY   • atorvastatin (LIPITOR) 20 MG tablet Take 20 mg by mouth Every Night.   • cilostazol (PLETAL) 100 MG tablet Take 1 tablet by mouth 2 (two) times a day. PER DR SAYRA PONCE TO CONTINUE PLETAL   • dexlansoprazole (Dexilant) 60 MG capsule Take 1 capsule by mouth Daily.   • Diclofenac Sodium (Voltaren) 1 % gel gel Apply 4 g topically to the appropriate area as directed 4 (Four) Times a Day As Needed (pain).   • Dulaglutide  (Trulicity) 1.5 MG/0.5ML solution pen-injector Inject 1.5 mg under the skin into the appropriate area as directed Every 7 (Seven) Days.   • famotidine (PEPCID) 40 MG tablet Take 1 tablet by mouth Daily.   • fenofibrate (FENOGLIDE) 120 MG tablet Take 1 tablet by mouth Daily.   • ferrous sulfate 325 (65 FE) MG tablet Take 1 tablet by mouth 2 (two) times a day.   • glucose blood (OneTouch Verio) test strip Use test strips to test blood sugar three times daily for diagnosis E11.9 Diabetes   • Lancets (onetouch ultrasoft) lancets Use lancets to test blood sugar 3 times daily   • linagliptin (Tradjenta) 5 MG tablet tablet Take 1 tablet by mouth Daily.   • losartan (COZAAR) 25 MG tablet Take 1 tablet by mouth Daily.   • nitroglycerin (NITROSTAT) 0.4 MG SL tablet Nitrostat 0.4 mg sublingual tablet, sublingual place 1 tablet (0.4 mg) by buccal route at the first sign of an attack; no more than 3 tabs are recommended within a 15 minute period.   Active   • OneTouch Ultra test strip 1 each by Other route As Needed (blood sugar check). 4 times a day   • Symbicort 160-4.5 MCG/ACT inhaler Inhale 2 puffs 2 (two) times a day.   • traMADol (ULTRAM) 50 MG tablet Take 1 tablet by mouth Daily As Needed (once daily as needed).          Allergies:  No Known Allergies    Objective   Objective     Vitals:        Physical Exam   General: Alert, no acute distress.  Neck: Supple  Heart: Regular rate  Lungs: Clear  Abdomen: Benign  Extremities: Symmetric  Pulses: +2 left radial    Diagnostic studies:   The vein mapping ultrasound dated 3/30/2022 demonstrates a left cephalic vein to be suboptimal in caliber for permanent access for hemodialysis.  The left upper arm basilic vein appeared satisfactory.    Assessment/Plan   Assessment / Plan     Active Hospital Problems:  Active Hospital Problems    Diagnosis    • **Stage 4 chronic kidney disease (HCC)        There are no diagnoses linked to this encounter.    Assessment/plan:   Mrs. Rai is in  need for permanent access for hemodialysis.  The plan is for a left basilic vein transposition.  I have discussed with her in detail the mechanics of the procedure, the indications, benefits, risks, alternatives, as well as potential complications to include but not limited to infection, bleeding, reoperation, failure of the fistula to develop.  She appears to understand and desires to proceed.      Electronically signed by Wan Barksdale MD, 05/09/22, 1:29 PM EDT.

## 2022-05-10 ENCOUNTER — ANESTHESIA (OUTPATIENT)
Dept: PERIOP | Facility: HOSPITAL | Age: 87
End: 2022-05-10

## 2022-05-10 ENCOUNTER — HOSPITAL ENCOUNTER (OUTPATIENT)
Facility: HOSPITAL | Age: 87
Setting detail: HOSPITAL OUTPATIENT SURGERY
Discharge: HOME | End: 2022-05-10
Attending: SURGERY | Admitting: SURGERY

## 2022-05-10 VITALS
HEIGHT: 64 IN | BODY MASS INDEX: 26.53 KG/M2 | RESPIRATION RATE: 16 BRPM | HEART RATE: 58 BPM | WEIGHT: 155.42 LBS | DIASTOLIC BLOOD PRESSURE: 61 MMHG | TEMPERATURE: 97.1 F | OXYGEN SATURATION: 97 % | SYSTOLIC BLOOD PRESSURE: 130 MMHG

## 2022-05-10 DIAGNOSIS — N18.9 CHRONIC KIDNEY DISEASE, UNSPECIFIED CKD STAGE: ICD-10-CM

## 2022-05-10 LAB
ANION GAP SERPL CALCULATED.3IONS-SCNC: 11.6 MMOL/L (ref 5–15)
BASOPHILS # BLD AUTO: 0.06 10*3/MM3 (ref 0–0.2)
BASOPHILS NFR BLD AUTO: 0.9 % (ref 0–1.5)
BUN SERPL-MCNC: 47 MG/DL (ref 8–23)
BUN/CREAT SERPL: 15.8 (ref 7–25)
CALCIUM SPEC-SCNC: 9.6 MG/DL (ref 8.6–10.5)
CHLORIDE SERPL-SCNC: 104 MMOL/L (ref 98–107)
CO2 SERPL-SCNC: 25.4 MMOL/L (ref 22–29)
CREAT SERPL-MCNC: 2.98 MG/DL (ref 0.57–1)
DEPRECATED RDW RBC AUTO: 47.5 FL (ref 37–54)
EGFRCR SERPLBLD CKD-EPI 2021: 14.9 ML/MIN/1.73
EOSINOPHIL # BLD AUTO: 0.18 10*3/MM3 (ref 0–0.4)
EOSINOPHIL NFR BLD AUTO: 2.8 % (ref 0.3–6.2)
ERYTHROCYTE [DISTWIDTH] IN BLOOD BY AUTOMATED COUNT: 12.8 % (ref 12.3–15.4)
GLUCOSE BLDC GLUCOMTR-MCNC: 146 MG/DL (ref 70–99)
GLUCOSE SERPL-MCNC: 109 MG/DL (ref 65–99)
HCT VFR BLD AUTO: 32 % (ref 34–46.6)
HGB BLD-MCNC: 10.3 G/DL (ref 12–15.9)
IMM GRANULOCYTES # BLD AUTO: 0.02 10*3/MM3 (ref 0–0.05)
IMM GRANULOCYTES NFR BLD AUTO: 0.3 % (ref 0–0.5)
LYMPHOCYTES # BLD AUTO: 1.05 10*3/MM3 (ref 0.7–3.1)
LYMPHOCYTES NFR BLD AUTO: 16.5 % (ref 19.6–45.3)
MCH RBC QN AUTO: 32.4 PG (ref 26.6–33)
MCHC RBC AUTO-ENTMCNC: 32.2 G/DL (ref 31.5–35.7)
MCV RBC AUTO: 100.6 FL (ref 79–97)
MONOCYTES # BLD AUTO: 0.99 10*3/MM3 (ref 0.1–0.9)
MONOCYTES NFR BLD AUTO: 15.5 % (ref 5–12)
NEUTROPHILS NFR BLD AUTO: 4.07 10*3/MM3 (ref 1.7–7)
NEUTROPHILS NFR BLD AUTO: 64 % (ref 42.7–76)
NRBC BLD AUTO-RTO: 0 /100 WBC (ref 0–0.2)
PLATELET # BLD AUTO: 304 10*3/MM3 (ref 140–450)
PMV BLD AUTO: 9.3 FL (ref 6–12)
POTASSIUM SERPL-SCNC: 4.3 MMOL/L (ref 3.5–5.2)
RBC # BLD AUTO: 3.18 10*6/MM3 (ref 3.77–5.28)
SODIUM SERPL-SCNC: 141 MMOL/L (ref 136–145)
WBC NRBC COR # BLD: 6.37 10*3/MM3 (ref 3.4–10.8)

## 2022-05-10 PROCEDURE — 25010000002 METOCLOPRAMIDE PER 10 MG: Performed by: ANESTHESIOLOGY

## 2022-05-10 PROCEDURE — 25010000002 ONDANSETRON PER 1 MG

## 2022-05-10 PROCEDURE — 25010000002 CEFAZOLIN IN DEXTROSE 2-4 GM/100ML-% SOLUTION: Performed by: SURGERY

## 2022-05-10 PROCEDURE — 25010000002 HEPARIN (PORCINE) PER 1000 UNITS: Performed by: SURGERY

## 2022-05-10 PROCEDURE — 25010000002 HEPARIN (PORCINE) PER 1000 UNITS

## 2022-05-10 PROCEDURE — 85025 COMPLETE CBC W/AUTO DIFF WBC: CPT | Performed by: ANESTHESIOLOGY

## 2022-05-10 PROCEDURE — 25010000002 FENTANYL CITRATE (PF) 50 MCG/ML SOLUTION

## 2022-05-10 PROCEDURE — 80048 BASIC METABOLIC PNL TOTAL CA: CPT | Performed by: ANESTHESIOLOGY

## 2022-05-10 PROCEDURE — 25010000002 DEXAMETHASONE PER 1 MG

## 2022-05-10 PROCEDURE — 36819 AV FUSE UPPR ARM BASILIC: CPT | Performed by: SURGERY

## 2022-05-10 PROCEDURE — 82962 GLUCOSE BLOOD TEST: CPT

## 2022-05-10 PROCEDURE — 0 LIDOCAINE 1 % SOLUTION 20 ML VIAL: Performed by: SURGERY

## 2022-05-10 PROCEDURE — 25010000002 PROPOFOL 10 MG/ML EMULSION

## 2022-05-10 PROCEDURE — 25010000002 MIDAZOLAM PER 1 MG: Performed by: ANESTHESIOLOGY

## 2022-05-10 DEVICE — LIGACLIP MCA MULTIPLE CLIP APPLIERS, 20 SMALL CLIPS
Type: IMPLANTABLE DEVICE | Site: ARM | Status: FUNCTIONAL
Brand: LIGACLIP

## 2022-05-10 RX ORDER — SODIUM CHLORIDE 9 MG/ML
9 INJECTION, SOLUTION INTRAVENOUS CONTINUOUS PRN
Status: DISCONTINUED | OUTPATIENT
Start: 2022-05-10 | End: 2022-05-10 | Stop reason: HOSPADM

## 2022-05-10 RX ORDER — PROPOFOL 10 MG/ML
VIAL (ML) INTRAVENOUS AS NEEDED
Status: DISCONTINUED | OUTPATIENT
Start: 2022-05-10 | End: 2022-05-10 | Stop reason: SURG

## 2022-05-10 RX ORDER — OXYCODONE HYDROCHLORIDE 5 MG/1
5 TABLET ORAL
Status: DISCONTINUED | OUTPATIENT
Start: 2022-05-10 | End: 2022-05-10 | Stop reason: HOSPADM

## 2022-05-10 RX ORDER — ONDANSETRON 2 MG/ML
INJECTION INTRAMUSCULAR; INTRAVENOUS AS NEEDED
Status: DISCONTINUED | OUTPATIENT
Start: 2022-05-10 | End: 2022-05-10 | Stop reason: SURG

## 2022-05-10 RX ORDER — METOCLOPRAMIDE HYDROCHLORIDE 5 MG/ML
10 INJECTION INTRAMUSCULAR; INTRAVENOUS ONCE AS NEEDED
Status: COMPLETED | OUTPATIENT
Start: 2022-05-10 | End: 2022-05-10

## 2022-05-10 RX ORDER — MIDAZOLAM HYDROCHLORIDE 1 MG/ML
2 INJECTION INTRAMUSCULAR; INTRAVENOUS ONCE
Status: COMPLETED | OUTPATIENT
Start: 2022-05-10 | End: 2022-05-10

## 2022-05-10 RX ORDER — HEPARIN SODIUM 10000 [USP'U]/ML
INJECTION, SOLUTION INTRAVENOUS; SUBCUTANEOUS AS NEEDED
Status: DISCONTINUED | OUTPATIENT
Start: 2022-05-10 | End: 2022-05-10 | Stop reason: SURG

## 2022-05-10 RX ORDER — OXYCODONE HYDROCHLORIDE AND ACETAMINOPHEN 5; 325 MG/1; MG/1
1 TABLET ORAL EVERY 6 HOURS PRN
Qty: 20 TABLET | Refills: 0 | Status: SHIPPED | OUTPATIENT
Start: 2022-05-10 | End: 2022-05-15

## 2022-05-10 RX ORDER — ACETAMINOPHEN 500 MG
1000 TABLET ORAL ONCE
Status: COMPLETED | OUTPATIENT
Start: 2022-05-10 | End: 2022-05-10

## 2022-05-10 RX ORDER — ROCURONIUM BROMIDE 10 MG/ML
INJECTION, SOLUTION INTRAVENOUS AS NEEDED
Status: DISCONTINUED | OUTPATIENT
Start: 2022-05-10 | End: 2022-05-10 | Stop reason: SURG

## 2022-05-10 RX ORDER — FENTANYL CITRATE 50 UG/ML
INJECTION, SOLUTION INTRAMUSCULAR; INTRAVENOUS AS NEEDED
Status: DISCONTINUED | OUTPATIENT
Start: 2022-05-10 | End: 2022-05-10 | Stop reason: SURG

## 2022-05-10 RX ORDER — EPHEDRINE SULFATE 50 MG/ML
INJECTION, SOLUTION INTRAVENOUS AS NEEDED
Status: DISCONTINUED | OUTPATIENT
Start: 2022-05-10 | End: 2022-05-10 | Stop reason: SURG

## 2022-05-10 RX ORDER — ONDANSETRON 2 MG/ML
4 INJECTION INTRAMUSCULAR; INTRAVENOUS ONCE AS NEEDED
Status: DISCONTINUED | OUTPATIENT
Start: 2022-05-10 | End: 2022-05-10 | Stop reason: HOSPADM

## 2022-05-10 RX ORDER — PHENYLEPHRINE HCL IN 0.9% NACL 1 MG/10 ML
SYRINGE (ML) INTRAVENOUS AS NEEDED
Status: DISCONTINUED | OUTPATIENT
Start: 2022-05-10 | End: 2022-05-10 | Stop reason: SURG

## 2022-05-10 RX ORDER — CEFAZOLIN SODIUM 2 G/100ML
2 INJECTION, SOLUTION INTRAVENOUS ONCE
Status: COMPLETED | OUTPATIENT
Start: 2022-05-10 | End: 2022-05-10

## 2022-05-10 RX ORDER — LIDOCAINE HYDROCHLORIDE 20 MG/ML
INJECTION, SOLUTION EPIDURAL; INFILTRATION; INTRACAUDAL; PERINEURAL AS NEEDED
Status: DISCONTINUED | OUTPATIENT
Start: 2022-05-10 | End: 2022-05-10 | Stop reason: SURG

## 2022-05-10 RX ORDER — DEXAMETHASONE SODIUM PHOSPHATE 4 MG/ML
INJECTION, SOLUTION INTRA-ARTICULAR; INTRALESIONAL; INTRAMUSCULAR; INTRAVENOUS; SOFT TISSUE AS NEEDED
Status: DISCONTINUED | OUTPATIENT
Start: 2022-05-10 | End: 2022-05-10 | Stop reason: SURG

## 2022-05-10 RX ADMIN — EPHEDRINE SULFATE 5 MG: 50 INJECTION INTRAVENOUS at 08:11

## 2022-05-10 RX ADMIN — SUGAMMADEX 200 MG: 100 INJECTION, SOLUTION INTRAVENOUS at 09:11

## 2022-05-10 RX ADMIN — FENTANYL CITRATE 25 MCG: 50 INJECTION, SOLUTION INTRAMUSCULAR; INTRAVENOUS at 07:52

## 2022-05-10 RX ADMIN — MIDAZOLAM HYDROCHLORIDE 2 MG: 1 INJECTION, SOLUTION INTRAMUSCULAR; INTRAVENOUS at 07:05

## 2022-05-10 RX ADMIN — FENTANYL CITRATE 50 MCG: 50 INJECTION, SOLUTION INTRAMUSCULAR; INTRAVENOUS at 07:23

## 2022-05-10 RX ADMIN — PROPOFOL 50 MG: 10 INJECTION, EMULSION INTRAVENOUS at 07:23

## 2022-05-10 RX ADMIN — SODIUM CHLORIDE 9 ML/HR: 9 INJECTION, SOLUTION INTRAVENOUS at 07:04

## 2022-05-10 RX ADMIN — PROPOFOL 20 MG: 10 INJECTION, EMULSION INTRAVENOUS at 07:24

## 2022-05-10 RX ADMIN — ACETAMINOPHEN 1000 MG: 500 TABLET ORAL at 07:04

## 2022-05-10 RX ADMIN — METOCLOPRAMIDE HYDROCHLORIDE 10 MG: 5 INJECTION INTRAMUSCULAR; INTRAVENOUS at 07:05

## 2022-05-10 RX ADMIN — LIDOCAINE HYDROCHLORIDE 60 MG: 20 INJECTION, SOLUTION EPIDURAL; INFILTRATION; INTRACAUDAL; PERINEURAL at 07:22

## 2022-05-10 RX ADMIN — DEXAMETHASONE SODIUM PHOSPHATE 4 MG: 4 INJECTION, SOLUTION INTRA-ARTICULAR; INTRALESIONAL; INTRAMUSCULAR; INTRAVENOUS; SOFT TISSUE at 07:46

## 2022-05-10 RX ADMIN — Medication 100 MCG: at 08:25

## 2022-05-10 RX ADMIN — Medication 100 MCG: at 07:35

## 2022-05-10 RX ADMIN — HEPARIN SODIUM 3000 UNITS: 10000 INJECTION, SOLUTION INTRAVENOUS; SUBCUTANEOUS at 08:18

## 2022-05-10 RX ADMIN — CEFAZOLIN SODIUM 2 G: 2 INJECTION, SOLUTION INTRAVENOUS at 07:26

## 2022-05-10 RX ADMIN — Medication 100 MCG: at 07:40

## 2022-05-10 RX ADMIN — ONDANSETRON 4 MG: 2 INJECTION INTRAMUSCULAR; INTRAVENOUS at 07:46

## 2022-05-10 RX ADMIN — ROCURONIUM BROMIDE 40 MG: 10 INJECTION INTRAVENOUS at 07:24

## 2022-05-10 RX ADMIN — ROCURONIUM BROMIDE 10 MG: 10 INJECTION INTRAVENOUS at 08:15

## 2022-05-10 NOTE — ANESTHESIA PREPROCEDURE EVALUATION
Anesthesia Evaluation     Patient summary reviewed and Nursing notes reviewed   history of anesthetic complications:  NPO Solid Status: > 8 hours  NPO Liquid Status: > 2 hours           Airway   Mallampati: II  TM distance: >3 FB  Neck ROM: full  No difficulty expected  Dental      Pulmonary - normal exam    breath sounds clear to auscultation  (+) lung cancer, COPD, asthma,shortness of breath,   Cardiovascular - normal exam  Exercise tolerance: poor (<4 METS)    Rhythm: regular  Rate: normal    (+) hypertension, valvular problems/murmurs, CAD, CHF , PVD, DVT, hyperlipidemia,  carotid artery disease      Neuro/Psych  (+) headaches, numbness,    GI/Hepatic/Renal/Endo    (+)  GERD, PUD,  renal disease, diabetes mellitus, thyroid problem hypothyroidism    Musculoskeletal     Abdominal    Substance History - negative use     OB/GYN negative ob/gyn ROS         Other   arthritis,    history of cancer    ROS/Med Hx Other:  <4METS; SOAE, PAIN ON AMB. RECENT URI, ABX COMPLETED. HX PALPITATIONS, STAGE IV, HYPERKALEMIA, DM. ECHO 3/19 60% EFM, MILD FIBROCALCIFIC MV, AV. NO STENOSIS. NEPHRO OV 3/22 STABLE, DM CONTROLLED. CARDS OV 12/21 F/U 1 YR., STABLE. CXR 4/8/22 WNL.            Latest Reference Range & Units 05/10/22 05:50   Sodium 136 - 145 mmol/L 141   Potassium 3.5 - 5.2 mmol/L 4.3   CO2 22.0 - 29.0 mmol/L 25.4   Chloride 98 - 107 mmol/L 104   Anion Gap 5.0 - 15.0 mmol/L 11.6   Creatinine 0.57 - 1.00 mg/dL 2.98 (H)   BUN 8 - 23 mg/dL 47 (H)   BUN/Creatinine Ratio 7.0 - 25.0  15.8   Calcium 8.6 - 10.5 mg/dL 9.6   EGFR >60.0 mL/min/1.73 14.9 (L) [1]   (H): Data is abnormally high  (L): Data is abnormally low  [1] <15 Indicative of kidney failure       Anesthesia Plan    ASA 4     general   (Patient understands anesthesia not responsible for dental damage.)  intravenous induction     Anesthetic plan, all risks, benefits, and alternatives have been provided, discussed and informed consent has been obtained with: patient.  Use  of blood products discussed with patient .   Plan discussed with CRNA.        CODE STATUS:

## 2022-05-10 NOTE — OP NOTE
ARTERIOVENOUS FISTULA FORMATION  Procedure Report    Patient Name:  Charlette Rai  YOB: 1935    Date of Surgery:  5/10/2022     Indications: Need for permanent access for hemodialysis    Pre-op Diagnosis:   Chronic kidney disease, unspecified CKD stage [N18.9]       Post-Op Diagnosis Codes:     * Chronic kidney disease, unspecified CKD stage [N18.9]    Procedure(s):  Left basilic vein transposition    Staff:  Surgeon(s):  Wan Barksdale MD    Assistant: Ninoska Weaver RN    Anesthesia: General    Estimated Blood Loss: 30 mL    Implants:    Implant Name Type Inv. Item Serial No.  Lot No. LRB No. Used Action   CLIPAPPLR M/ ENDO LIGACLIP 9 3/8IN SM - [id number] Implant CLIPAPPLR M/ ENDO LIGACLIP 9 3/8IN   [employer]  DIV OF J AND J [id number] Left 1 Implanted       Specimen: None       Findings: Palpable thrill upon completion of the procedure.      Complications: None    Description of Procedure: The patient was brought into the operating room and was placed in the supine position.  She was then induced into general anesthesia and positioned with the left arm extended on an armboard.  She was then prepped and draped in the usual aseptic fashion.  A stockinette was applied to the hand and forearm and was secured in place using Coban.  A timeout was taken to confirm patient, procedure and laterality.  Local anesthesia was then administered at the projected site of incisions.  Skip incisions were then made in a longitudinal fashion over the medial aspect of the upper arm extending from 2 to 3 cm below the axilla to approximately 1 fingerbreadth above the antecubital crease.  Via the most distal incision the subcutaneous tissue was traversed using electrocautery.  Blunt and sharp dissection were then used to expose and dissect the basilic vein.  This was dissected approximately 2 to 3 cm beyond the incision.  The reminder of the vein was then exposed all the way to the axilla via the  skip incisions.  Side branches were double clipped on the vein side and single clipped on the opposite side with small and medium clips depending on the size of the branch.  Once completely exposed the distal end was clipped and the vein transected.  The vein was completely mobilized up to the axilla.  The vein was then flushed and distended using heparinized saline.  The vein was marked with a marker to avoid twisting during tunneling.  A packing vaginal clamp was used to create a tunnel between the most distal aspect of the incision in a curved fashion along the lateral aspect of the arm and coming back to the axilla.  The vein was then brought through the tunnel.  The vein was once again flushed to ensure that no twisting had occurred or no disruption of the vein.  The patient was then given heparin 3000 IV.  The brachial artery was then dissected at the projected site of anastomosis in the distal upper arm.  Circumferential control was obtained using Vesseloops.  3 minutes were allowed to elapse since administration of the heparin.  The brachial artery was then clamped proximally and distally.  A longitudinal arteriotomy was created using an 11 blade and extended using Barone scissors.  The total length of the arteriotomy was 6 mm.  The end of the vein was then fashioned to me the dimensions of the arteriotomy.  An anastomosis was then created using 6-0 Prolene in a running fashion.  Just prior to completion of the anastomosis all vessels were bled.  The anastomosis was completed.  Doppler examination revealed the above-mentioned findings.  The wound was inspected for hemostasis and hemostasis assured.  The wound was then approximated using 3-0 Vicryl in a running fashion for approximation of the subcutaneous tissue followed by 4-0 Monocryl in a running subcuticular stitch followed by Dermabond.  The patient tolerated the procedure well.        Assistant: Ninoska Weaver RN  was responsible for performing the  following activities: First assist and their skilled assistance was necessary for the success of this case.    Wan Barksdale MD     Date: 5/10/2022  Time: 09:30 EDT

## 2022-05-10 NOTE — ANESTHESIA POSTPROCEDURE EVALUATION
Patient: Charlette Rai    Procedure Summary     Date: 05/10/22 Room / Location: [facility] OR 01 / [facility] MAIN OR    Anesthesia Start: 0716 Anesthesia Stop: 0920    Procedure: Left basilic vein transposition (Left ) Diagnosis:       Chronic kidney disease, unspecified CKD stage      (Chronic kidney disease, unspecified CKD stage [N18.9])    Surgeons: Wan Barksdale MD Provider: Kandy Amos DO    Anesthesia Type: general ASA Status: 4          Anesthesia Type: general    Vitals  Vitals Value Taken Time   /50 05/10/22 0945   Temp 36.4 °C (97.5 °F) 05/10/22 0920   Pulse 93 05/10/22 0949   Resp 16 05/10/22 0935   SpO2 92 % 05/10/22 0949   Vitals shown include unvalidated device data.        Post Anesthesia Care and Evaluation    Patient location during evaluation: bedside  Patient participation: complete - patient participated  Level of consciousness: awake  Pain management: adequate  Airway patency: patent  Anesthetic complications: No anesthetic complications  PONV Status: none  Cardiovascular status: acceptable and stable  Respiratory status: acceptable  Hydration status: acceptable    Comments: An Anesthesiologist personally participated in the most demanding procedures (including induction and emergence if applicable) in the anesthesia plan, monitored the course of anesthesia administration at frequent intervals and remained physically present and available for immediate diagnosis and treatment of emergencies.

## 2022-05-10 NOTE — DISCHARGE INSTRUCTIONS
DISCHARGE INSTRUCTIONS  SURGICAL / AMBULATORY  PROCEDURES      For your surgery you had:  General anesthesia (you may have a sore throat for the first 24 hours)  You may experience dizziness, drowsiness, or light-headedness for several hours following surgery/procedure.  Do not stay alone today or tonight.  Limit your activity for 24 hours.  Resume your diet slowly.  Follow whatever special dietary instructions you may have been given by your doctor.  You should not drive or operate machinery, drink alcohol, or sign legally binding documents for 24 hours or while you are taking pain medication.    NOTIFY YOUR DOCTOR IF YOU EXPERIENCE ANY OF THE FOLLOWING:  Temperature greater than 101 degrees Fahrenheit  Shaking Chills  Redness or excessive drainage from incision  Nausea, vomiting and/or pain that is not controlled by prescribed medications  Increase in bleeding or bleeding that is excessive  Unable to urinate in 6 hours after surgery  If unable to reach your doctor, please go to the closest Emergency Room  Apply an ice pack 24-48 hours.  Medications per physician instructions as indicated on Discharge Medication Information Sheet.      SPECIAL INSTRUCTIONS:  May wash area in 2 days.  Follow-up in the office in 2 weeks, call for appointment.  Resume home diet.  Resume home medications.  No lifting greater than 15 pounds for 2 weeks.      Last dose of pain medication was given at: 0700

## 2022-05-25 ENCOUNTER — OFFICE VISIT (OUTPATIENT)
Dept: VASCULAR SURGERY | Facility: HOSPITAL | Age: 87
End: 2022-05-25

## 2022-05-25 VITALS
OXYGEN SATURATION: 94 % | SYSTOLIC BLOOD PRESSURE: 138 MMHG | TEMPERATURE: 97.6 F | DIASTOLIC BLOOD PRESSURE: 50 MMHG | HEART RATE: 87 BPM | RESPIRATION RATE: 18 BRPM

## 2022-05-25 DIAGNOSIS — N18.9 CHRONIC KIDNEY DISEASE, UNSPECIFIED CKD STAGE: Primary | ICD-10-CM

## 2022-05-25 PROCEDURE — G0463 HOSPITAL OUTPT CLINIC VISIT: HCPCS | Performed by: SURGERY

## 2022-05-25 PROCEDURE — 99024 POSTOP FOLLOW-UP VISIT: CPT | Performed by: SURGERY

## 2022-05-25 NOTE — PROGRESS NOTES
[facility]   Follow up Office    Patient Name: Charlette Rai  : 1935  MRN: [id number]  Primary Care Physician:  Paloma James MD      Subjective   Subjective     HPI:    Charlette Rai is a 87 y.o. female here for follow-up after left basilic vein transposition 5/10/2022.  Doing well.  No complaints.      Objective     Vitals:   Temp:  [97.6 °F (36.4 °C)] 97.6 °F (36.4 °C)  Heart Rate:  [87] 87  Resp:  [18] 18  BP: (138)/(50) 138/50    Physical Exam      General: Alert, no acute distress  Wound: Healing well, no signs of infection.  AV fistula: Excellent bruit and thrill.    Assessment & Plan   Assessment / Plan     Diagnoses and all orders for this visit:    1. Chronic kidney disease, unspecified CKD stage (Primary)       Assessment/Plan:   Satisfactory progress following left basilic vein transposition.  Exercise the left arm.  Follow-up in 1 month.        Electronically signed by Wan Barksdale MD, 22, 9:57 AM EDT.

## 2022-06-03 ENCOUNTER — LAB (OUTPATIENT)
Dept: LAB | Facility: HOSPITAL | Age: 87
End: 2022-06-03

## 2022-06-03 ENCOUNTER — TRANSCRIBE ORDERS (OUTPATIENT)
Dept: LAB | Facility: HOSPITAL | Age: 87
End: 2022-06-03

## 2022-06-03 DIAGNOSIS — N18.4 CHRONIC KIDNEY DISEASE, STAGE IV (SEVERE): ICD-10-CM

## 2022-06-03 DIAGNOSIS — N18.4 CHRONIC KIDNEY DISEASE, STAGE IV (SEVERE): Primary | ICD-10-CM

## 2022-06-03 LAB
ALBUMIN SERPL-MCNC: 4 G/DL (ref 3.5–5.2)
ANION GAP SERPL CALCULATED.3IONS-SCNC: 9.6 MMOL/L (ref 5–15)
BASOPHILS # BLD AUTO: 0.04 10*3/MM3 (ref 0–0.2)
BASOPHILS NFR BLD AUTO: 0.8 % (ref 0–1.5)
BUN SERPL-MCNC: 38 MG/DL (ref 8–23)
BUN/CREAT SERPL: 11.8 (ref 7–25)
CALCIUM SPEC-SCNC: 9 MG/DL (ref 8.6–10.5)
CHLORIDE SERPL-SCNC: 100 MMOL/L (ref 98–107)
CO2 SERPL-SCNC: 29.4 MMOL/L (ref 22–29)
CREAT SERPL-MCNC: 3.23 MG/DL (ref 0.57–1)
DEPRECATED RDW RBC AUTO: 42.8 FL (ref 37–54)
EGFRCR SERPLBLD CKD-EPI 2021: 13.5 ML/MIN/1.73
EOSINOPHIL # BLD AUTO: 0.15 10*3/MM3 (ref 0–0.4)
EOSINOPHIL NFR BLD AUTO: 2.8 % (ref 0.3–6.2)
ERYTHROCYTE [DISTWIDTH] IN BLOOD BY AUTOMATED COUNT: 11.8 % (ref 12.3–15.4)
GLUCOSE SERPL-MCNC: 91 MG/DL (ref 65–99)
HCT VFR BLD AUTO: 33 % (ref 34–46.6)
HGB BLD-MCNC: 10.8 G/DL (ref 12–15.9)
IMM GRANULOCYTES # BLD AUTO: 0.02 10*3/MM3 (ref 0–0.05)
IMM GRANULOCYTES NFR BLD AUTO: 0.4 % (ref 0–0.5)
LYMPHOCYTES # BLD AUTO: 0.85 10*3/MM3 (ref 0.7–3.1)
LYMPHOCYTES NFR BLD AUTO: 16.1 % (ref 19.6–45.3)
MCH RBC QN AUTO: 32.2 PG (ref 26.6–33)
MCHC RBC AUTO-ENTMCNC: 32.7 G/DL (ref 31.5–35.7)
MCV RBC AUTO: 98.5 FL (ref 79–97)
MONOCYTES # BLD AUTO: 0.87 10*3/MM3 (ref 0.1–0.9)
MONOCYTES NFR BLD AUTO: 16.5 % (ref 5–12)
NEUTROPHILS NFR BLD AUTO: 3.34 10*3/MM3 (ref 1.7–7)
NEUTROPHILS NFR BLD AUTO: 63.4 % (ref 42.7–76)
NRBC BLD AUTO-RTO: 0 /100 WBC (ref 0–0.2)
PHOSPHATE SERPL-MCNC: 4.1 MG/DL (ref 2.5–4.5)
PLATELET # BLD AUTO: 305 10*3/MM3 (ref 140–450)
PMV BLD AUTO: 10 FL (ref 6–12)
POTASSIUM SERPL-SCNC: 4.1 MMOL/L (ref 3.5–5.2)
RBC # BLD AUTO: 3.35 10*6/MM3 (ref 3.77–5.28)
SODIUM SERPL-SCNC: 139 MMOL/L (ref 136–145)
WBC NRBC COR # BLD: 5.27 10*3/MM3 (ref 3.4–10.8)

## 2022-06-03 PROCEDURE — 36415 COLL VENOUS BLD VENIPUNCTURE: CPT

## 2022-06-03 PROCEDURE — 85025 COMPLETE CBC W/AUTO DIFF WBC: CPT

## 2022-06-03 PROCEDURE — 80069 RENAL FUNCTION PANEL: CPT

## 2022-06-27 ENCOUNTER — OFFICE VISIT (OUTPATIENT)
Dept: VASCULAR SURGERY | Facility: HOSPITAL | Age: 87
End: 2022-06-27

## 2022-06-27 ENCOUNTER — HOSPITAL ENCOUNTER (OUTPATIENT)
Dept: CARDIOLOGY | Facility: HOSPITAL | Age: 87
Discharge: HOME | End: 2022-06-27

## 2022-06-27 VITALS
SYSTOLIC BLOOD PRESSURE: 158 MMHG | HEART RATE: 72 BPM | TEMPERATURE: 97.2 F | DIASTOLIC BLOOD PRESSURE: 73 MMHG | HEIGHT: 65 IN | RESPIRATION RATE: 18 BRPM | OXYGEN SATURATION: 94 % | WEIGHT: 155 LBS | BODY MASS INDEX: 25.83 KG/M2

## 2022-06-27 DIAGNOSIS — I72.4 FEMORAL ARTERY PSEUDOANEURYSM COMPLICATING CARDIAC CATHETERIZATION, SUBSEQUENT ENCOUNTER: ICD-10-CM

## 2022-06-27 DIAGNOSIS — N18.9 CHRONIC KIDNEY DISEASE, UNSPECIFIED CKD STAGE: Primary | ICD-10-CM

## 2022-06-27 DIAGNOSIS — T81.718D FEMORAL ARTERY PSEUDOANEURYSM COMPLICATING CARDIAC CATHETERIZATION, SUBSEQUENT ENCOUNTER: ICD-10-CM

## 2022-06-27 LAB
BH CV LEFT GROIN PSA PROCEDURE SCRIPTING LRR: 1
BH CV LOWER ARTERIAL LEFT ABI RATIO: 0.84
BH CV LOWER ARTERIAL LEFT DORSALIS PEDIS SYS MAX: 132
BH CV LOWER ARTERIAL LEFT GREAT TOE SYS MAX: 102
BH CV LOWER ARTERIAL LEFT POST TIBIAL SYS MAX: 131
BH CV LOWER ARTERIAL LEFT TBI RATIO: 0.65
BH CV LOWER ARTERIAL RIGHT ABI RATIO: 0.65
BH CV LOWER ARTERIAL RIGHT DORSALIS PEDIS SYS MAX: 102
BH CV LOWER ARTERIAL RIGHT GREAT TOE SYS MAX: 70
BH CV LOWER ARTERIAL RIGHT POST TIBIAL SYS MAX: 95
BH CV LOWER ARTERIAL RIGHT TBI RATIO: 0.44
BH CV VAS L ABI: 0.84
BH CV VAS L CFA VELOCITY EDV: 24.3 CM/SEC
BH CV VAS L EIA VELOCITY EDV: 24.3 CM/SEC
BH CV VAS L EIA VELOCITY PSV: 223 CM/SEC
BH CV VAS L PFA VELOCITY EDV: 3.4 CM/SEC
BH CV VAS L SFA VELOCITY EDV: 9 CM/SEC
BH CV VAS R ABI: 0.65
LEFT GROIN CFA SYS: 166 CM/SEC
MAXIMAL PREDICTED HEART RATE: 135 BPM
MAXIMAL PREDICTED HEART RATE: 135 BPM
PROX PFA PSV LEFT: 115 CM/SEC
PROX SFA PSV LEFT: 92 CM/SEC
STRESS TARGET HR: 115 BPM
STRESS TARGET HR: 115 BPM
UPPER ARTERIAL RIGHT ARM BRACHIAL SYS MAX: 153 MMHG

## 2022-06-27 PROCEDURE — 93926 LOWER EXTREMITY STUDY: CPT

## 2022-06-27 PROCEDURE — 93922 UPR/L XTREMITY ART 2 LEVELS: CPT

## 2022-06-27 PROCEDURE — 93926 LOWER EXTREMITY STUDY: CPT | Performed by: SURGERY

## 2022-06-27 PROCEDURE — 93922 UPR/L XTREMITY ART 2 LEVELS: CPT | Performed by: SURGERY

## 2022-06-27 PROCEDURE — 99024 POSTOP FOLLOW-UP VISIT: CPT | Performed by: SURGERY

## 2022-06-27 RX ORDER — ASPIRIN 81 MG/1
81 TABLET, CHEWABLE ORAL DAILY
COMMUNITY

## 2022-06-27 NOTE — PROGRESS NOTES
[facility]   Follow up Office    Patient Name: Charlette Rai  : 1935  MRN: [id number]  Primary Care Physician:  Paloma James MD      Subjective   Subjective     HPI:    Charlette Rai is a 87 y.o. female here for follow-up after left basilic vein transposition 5/10/2022.  Still feels a little bit sore otherwise no new complaints.        Objective     Vitals:   Temp:  [97.2 °F (36.2 °C)] 97.2 °F (36.2 °C)  Heart Rate:  [72] 72  Resp:  [18] 18  BP: (158)/(73) 158/73    Physical Exam      General: Alert, no acute distress  Wound: Healing well, no signs of infection.  AV fistula: Excellent bruit and thrill.    Assessment & Plan   Assessment / Plan     Diagnoses and all orders for this visit:    1. Chronic kidney disease, unspecified CKD stage (Primary)       Assessment/Plan:   Mrs. Rai's left arm fistula appears ready to be used if necessary.    She will follow-up with me in 2 months.    Electronically signed by Wan Barksdale MD, 22, 9:20 AM EDT.

## 2022-07-11 ENCOUNTER — LAB (OUTPATIENT)
Dept: LAB | Facility: HOSPITAL | Age: 87
End: 2022-07-11

## 2022-07-11 ENCOUNTER — TRANSCRIBE ORDERS (OUTPATIENT)
Dept: LAB | Facility: HOSPITAL | Age: 87
End: 2022-07-11

## 2022-07-11 DIAGNOSIS — N18.4 CHRONIC KIDNEY DISEASE, STAGE IV (SEVERE): Primary | ICD-10-CM

## 2022-07-11 DIAGNOSIS — N18.4 CHRONIC KIDNEY DISEASE, STAGE IV (SEVERE): ICD-10-CM

## 2022-07-11 LAB
ALBUMIN SERPL-MCNC: 3.9 G/DL (ref 3.5–5.2)
ANION GAP SERPL CALCULATED.3IONS-SCNC: 13.5 MMOL/L (ref 5–15)
BASOPHILS # BLD AUTO: 0.04 10*3/MM3 (ref 0–0.2)
BASOPHILS NFR BLD AUTO: 0.8 % (ref 0–1.5)
BUN SERPL-MCNC: 51 MG/DL (ref 8–23)
BUN/CREAT SERPL: 16.7 (ref 7–25)
CALCIUM SPEC-SCNC: 9.3 MG/DL (ref 8.6–10.5)
CHLORIDE SERPL-SCNC: 101 MMOL/L (ref 98–107)
CO2 SERPL-SCNC: 28.5 MMOL/L (ref 22–29)
CREAT SERPL-MCNC: 3.05 MG/DL (ref 0.57–1)
DEPRECATED RDW RBC AUTO: 40.9 FL (ref 37–54)
EGFRCR SERPLBLD CKD-EPI 2021: 14.5 ML/MIN/1.73
EOSINOPHIL # BLD AUTO: 0.14 10*3/MM3 (ref 0–0.4)
EOSINOPHIL NFR BLD AUTO: 2.7 % (ref 0.3–6.2)
ERYTHROCYTE [DISTWIDTH] IN BLOOD BY AUTOMATED COUNT: 12.1 % (ref 12.3–15.4)
GLUCOSE SERPL-MCNC: 91 MG/DL (ref 65–99)
HCT VFR BLD AUTO: 33.1 % (ref 34–46.6)
HGB BLD-MCNC: 11.3 G/DL (ref 12–15.9)
IMM GRANULOCYTES # BLD AUTO: 0.02 10*3/MM3 (ref 0–0.05)
IMM GRANULOCYTES NFR BLD AUTO: 0.4 % (ref 0–0.5)
LYMPHOCYTES # BLD AUTO: 0.94 10*3/MM3 (ref 0.7–3.1)
LYMPHOCYTES NFR BLD AUTO: 18 % (ref 19.6–45.3)
MCH RBC QN AUTO: 32.1 PG (ref 26.6–33)
MCHC RBC AUTO-ENTMCNC: 34.1 G/DL (ref 31.5–35.7)
MCV RBC AUTO: 94 FL (ref 79–97)
MONOCYTES # BLD AUTO: 0.83 10*3/MM3 (ref 0.1–0.9)
MONOCYTES NFR BLD AUTO: 15.9 % (ref 5–12)
NEUTROPHILS NFR BLD AUTO: 3.24 10*3/MM3 (ref 1.7–7)
NEUTROPHILS NFR BLD AUTO: 62.2 % (ref 42.7–76)
NRBC BLD AUTO-RTO: 0 /100 WBC (ref 0–0.2)
PHOSPHATE SERPL-MCNC: 4.8 MG/DL (ref 2.5–4.5)
PLATELET # BLD AUTO: 305 10*3/MM3 (ref 140–450)
PMV BLD AUTO: 9.9 FL (ref 6–12)
POTASSIUM SERPL-SCNC: 4.3 MMOL/L (ref 3.5–5.2)
RBC # BLD AUTO: 3.52 10*6/MM3 (ref 3.77–5.28)
SODIUM SERPL-SCNC: 143 MMOL/L (ref 136–145)
WBC NRBC COR # BLD: 5.21 10*3/MM3 (ref 3.4–10.8)

## 2022-07-11 PROCEDURE — 80069 RENAL FUNCTION PANEL: CPT

## 2022-07-11 PROCEDURE — 36415 COLL VENOUS BLD VENIPUNCTURE: CPT

## 2022-07-11 PROCEDURE — 85025 COMPLETE CBC W/AUTO DIFF WBC: CPT

## 2022-07-22 ENCOUNTER — HOSPITAL ENCOUNTER (OUTPATIENT)
Dept: GENERAL RADIOLOGY | Facility: HOSPITAL | Age: 87
Discharge: HOME | End: 2022-07-22
Admitting: INTERNAL MEDICINE

## 2022-07-22 ENCOUNTER — TRANSCRIBE ORDERS (OUTPATIENT)
Dept: GENERAL RADIOLOGY | Facility: HOSPITAL | Age: 87
End: 2022-07-22

## 2022-07-22 DIAGNOSIS — M54.50 LOW BACK PAIN, UNSPECIFIED BACK PAIN LATERALITY, UNSPECIFIED CHRONICITY, UNSPECIFIED WHETHER SCIATICA PRESENT: ICD-10-CM

## 2022-07-22 DIAGNOSIS — M54.50 LOW BACK PAIN, UNSPECIFIED BACK PAIN LATERALITY, UNSPECIFIED CHRONICITY, UNSPECIFIED WHETHER SCIATICA PRESENT: Primary | ICD-10-CM

## 2022-07-22 PROCEDURE — 72110 X-RAY EXAM L-2 SPINE 4/>VWS: CPT

## 2022-08-05 RX ORDER — LEVOCETIRIZINE DIHYDROCHLORIDE 5 MG/1
TABLET, FILM COATED ORAL
Qty: 90 TABLET | Refills: 1 | Status: SHIPPED | OUTPATIENT
Start: 2022-08-05 | End: 2022-12-27

## 2022-08-29 ENCOUNTER — OFFICE VISIT (OUTPATIENT)
Dept: VASCULAR SURGERY | Facility: HOSPITAL | Age: 87
End: 2022-08-29

## 2022-08-29 VITALS
SYSTOLIC BLOOD PRESSURE: 148 MMHG | WEIGHT: 150 LBS | TEMPERATURE: 97.6 F | HEIGHT: 65 IN | BODY MASS INDEX: 24.99 KG/M2 | HEART RATE: 93 BPM | OXYGEN SATURATION: 97 % | RESPIRATION RATE: 16 BRPM | DIASTOLIC BLOOD PRESSURE: 60 MMHG

## 2022-08-29 DIAGNOSIS — N18.9 CHRONIC KIDNEY DISEASE, UNSPECIFIED CKD STAGE: Primary | ICD-10-CM

## 2022-08-29 PROCEDURE — G0463 HOSPITAL OUTPT CLINIC VISIT: HCPCS | Performed by: SURGERY

## 2022-08-29 PROCEDURE — 99212 OFFICE O/P EST SF 10 MIN: CPT | Performed by: SURGERY

## 2022-08-29 RX ORDER — CALCIUM POLYCARBOPHIL 625 MG
625 TABLET ORAL
COMMUNITY
End: 2022-08-29 | Stop reason: SDUPTHER

## 2022-08-29 RX ORDER — DEXLANSOPRAZOLE 60 MG/1
CAPSULE, DELAYED RELEASE ORAL EVERY 24 HOURS
COMMUNITY
End: 2022-08-29 | Stop reason: SDUPTHER

## 2022-08-29 RX ORDER — ALBUTEROL SULFATE 90 UG/1
AEROSOL, METERED RESPIRATORY (INHALATION)
COMMUNITY
End: 2022-12-13 | Stop reason: SDUPTHER

## 2022-08-29 RX ORDER — HYDROCODONE BITARTRATE AND ACETAMINOPHEN 5; 325 MG/1; MG/1
1 TABLET ORAL EVERY 6 HOURS PRN
Qty: 20 TABLET | Refills: 0 | Status: SHIPPED | OUTPATIENT
Start: 2022-08-29

## 2022-08-29 RX ORDER — ATORVASTATIN CALCIUM 10 MG/1
TABLET, FILM COATED ORAL EVERY 24 HOURS
COMMUNITY
End: 2022-08-29 | Stop reason: SDUPTHER

## 2022-08-29 NOTE — PROGRESS NOTES
[facility]   Follow up Office    Patient Name: Charlette Rai  : 1935  MRN: [id number]  Primary Care Physician:  Clyde Lainez MD      Subjective   Subjective     HPI:    Charlette Rai is a 87 y.o. female here for follow-up after a left basilic vein transposition 5/10/2022.  Doing well.  Not yet on dialysis.      Objective     Vitals:   Temp:  [97.6 °F (36.4 °C)] 97.6 °F (36.4 °C)  Heart Rate:  [93] 93  Resp:  [16] 16  BP: (148)/(60) 148/60    Physical Exam      General: Alert, no acute distress  Wound: Healing well, no signs of infection.  AV fistula: Excellent bruit and thrill.    Assessment & Plan   Assessment / Plan     Diagnoses and all orders for this visit:    1. Chronic kidney disease, unspecified CKD stage (Primary)  -     HYDROcodone-acetaminophen (NORCO) 5-325 MG per tablet; Take 1 tablet by mouth Every 6 (Six) Hours As Needed for Severe Pain .  Dispense: 20 tablet; Refill: 0       Assessment/Plan:   Mrs. Rai's left arm fistula appears ready for use.  At this time I am indicating to her that if she needs to start dialysis they can use her left arm fistula.  Because she has not yet started the plan will be for her to follow-up in 3 months to periodically assess the fistula.        Electronically signed by Wan Barksdale MD, 22, 8:44 AM EDT.

## 2022-09-08 ENCOUNTER — TELEPHONE (OUTPATIENT)
Dept: INTERNAL MEDICINE | Facility: CLINIC | Age: 87
End: 2022-09-08

## 2022-09-08 RX ORDER — METOPROLOL SUCCINATE 50 MG/1
TABLET, EXTENDED RELEASE ORAL
Qty: 135 TABLET | Refills: 1 | Status: CANCELLED | OUTPATIENT
Start: 2022-09-08

## 2022-09-08 NOTE — TELEPHONE ENCOUNTER
Caller: [facility] - [place], [place] - [address] AT PORTAL TO REGISTERED [facility] - [phone] [facility] [phone] FX    Relationship: Pharmacy/MATT    Best call back number: [phone] OPT. 2    What is the best time to reach you: ANY    Who are you requesting to speak with (clinical staff, provider,  specific staff member): CLINICAL STAFF    What was the call regarding: MATT WITH [facility] IS CALLING TO GET CLARIFICATION ABOUT metoprolol succinate XL (TOPROL-XL) 50 MG 24 hr tablet     Do you require a callback: YES

## 2022-09-15 ENCOUNTER — TELEPHONE (OUTPATIENT)
Dept: INTERNAL MEDICINE | Facility: CLINIC | Age: 87
End: 2022-09-15

## 2022-09-15 ENCOUNTER — TRANSCRIBE ORDERS (OUTPATIENT)
Dept: ADMINISTRATIVE | Facility: HOSPITAL | Age: 87
End: 2022-09-15

## 2022-09-15 NOTE — TELEPHONE ENCOUNTER
Incoming fax requesting clarification of metoprolol suc 50mg.  Spoke with Dorothy (pharmacy tech) and Edmundo (pharmacist) that prescription is written 1 and 1/2 tablets at bedtime.

## 2022-09-16 ENCOUNTER — TRANSCRIBE ORDERS (OUTPATIENT)
Dept: LAB | Facility: HOSPITAL | Age: 87
End: 2022-09-16

## 2022-09-16 ENCOUNTER — LAB (OUTPATIENT)
Dept: LAB | Facility: HOSPITAL | Age: 87
End: 2022-09-16

## 2022-09-16 DIAGNOSIS — N18.4 CHRONIC KIDNEY DISEASE, STAGE IV (SEVERE): Primary | ICD-10-CM

## 2022-09-16 DIAGNOSIS — N18.4 CHRONIC KIDNEY DISEASE, STAGE IV (SEVERE): ICD-10-CM

## 2022-09-16 LAB
ALBUMIN SERPL-MCNC: 4 G/DL (ref 3.5–5.2)
ANION GAP SERPL CALCULATED.3IONS-SCNC: 9.5 MMOL/L (ref 5–15)
BASOPHILS # BLD AUTO: 0.06 10*3/MM3 (ref 0–0.2)
BASOPHILS NFR BLD AUTO: 0.9 % (ref 0–1.5)
BILIRUB UR QL STRIP: NEGATIVE
BUN SERPL-MCNC: 44 MG/DL (ref 8–23)
BUN/CREAT SERPL: 18 (ref 7–25)
CALCIUM SPEC-SCNC: 9.5 MG/DL (ref 8.6–10.5)
CHLORIDE SERPL-SCNC: 104 MMOL/L (ref 98–107)
CLARITY UR: CLEAR
CO2 SERPL-SCNC: 28.5 MMOL/L (ref 22–29)
COLOR UR: YELLOW
CREAT SERPL-MCNC: 2.44 MG/DL (ref 0.57–1)
CREAT UR-MCNC: 33.1 MG/DL
DEPRECATED RDW RBC AUTO: 46 FL (ref 37–54)
EGFRCR SERPLBLD CKD-EPI 2021: 19 ML/MIN/1.73
EOSINOPHIL # BLD AUTO: 0.19 10*3/MM3 (ref 0–0.4)
EOSINOPHIL NFR BLD AUTO: 2.9 % (ref 0.3–6.2)
ERYTHROCYTE [DISTWIDTH] IN BLOOD BY AUTOMATED COUNT: 13.1 % (ref 12.3–15.4)
GLUCOSE SERPL-MCNC: 98 MG/DL (ref 65–99)
GLUCOSE UR STRIP-MCNC: NEGATIVE MG/DL
HCT VFR BLD AUTO: 30.1 % (ref 34–46.6)
HGB BLD-MCNC: 9.8 G/DL (ref 12–15.9)
HGB UR QL STRIP.AUTO: NEGATIVE
IMM GRANULOCYTES # BLD AUTO: 0.02 10*3/MM3 (ref 0–0.05)
IMM GRANULOCYTES NFR BLD AUTO: 0.3 % (ref 0–0.5)
KETONES UR QL STRIP: NEGATIVE
LEUKOCYTE ESTERASE UR QL STRIP.AUTO: NEGATIVE
LYMPHOCYTES # BLD AUTO: 0.96 10*3/MM3 (ref 0.7–3.1)
LYMPHOCYTES NFR BLD AUTO: 14.6 % (ref 19.6–45.3)
MCH RBC QN AUTO: 31.4 PG (ref 26.6–33)
MCHC RBC AUTO-ENTMCNC: 32.6 G/DL (ref 31.5–35.7)
MCV RBC AUTO: 96.5 FL (ref 79–97)
MONOCYTES # BLD AUTO: 0.95 10*3/MM3 (ref 0.1–0.9)
MONOCYTES NFR BLD AUTO: 14.5 % (ref 5–12)
NEUTROPHILS NFR BLD AUTO: 4.39 10*3/MM3 (ref 1.7–7)
NEUTROPHILS NFR BLD AUTO: 66.8 % (ref 42.7–76)
NITRITE UR QL STRIP: NEGATIVE
NRBC BLD AUTO-RTO: 0 /100 WBC (ref 0–0.2)
PH UR STRIP.AUTO: 7 [PH] (ref 5–8)
PHOSPHATE SERPL-MCNC: 3.7 MG/DL (ref 2.5–4.5)
PLATELET # BLD AUTO: 370 10*3/MM3 (ref 140–450)
PMV BLD AUTO: 9.5 FL (ref 6–12)
POTASSIUM SERPL-SCNC: 5.2 MMOL/L (ref 3.5–5.2)
PROT ?TM UR-MCNC: 7.2 MG/DL
PROT UR QL STRIP: NEGATIVE
PROT/CREAT UR: 0.22 MG/G{CREAT}
PTH-INTACT SERPL-MCNC: 36.3 PG/ML (ref 15–65)
RBC # BLD AUTO: 3.12 10*6/MM3 (ref 3.77–5.28)
SODIUM SERPL-SCNC: 142 MMOL/L (ref 136–145)
SP GR UR STRIP: 1.01 (ref 1–1.03)
UROBILINOGEN UR QL STRIP: NORMAL
WBC NRBC COR # BLD: 6.57 10*3/MM3 (ref 3.4–10.8)

## 2022-09-16 PROCEDURE — 84156 ASSAY OF PROTEIN URINE: CPT

## 2022-09-16 PROCEDURE — 36415 COLL VENOUS BLD VENIPUNCTURE: CPT

## 2022-09-16 PROCEDURE — 85025 COMPLETE CBC W/AUTO DIFF WBC: CPT

## 2022-09-16 PROCEDURE — 82570 ASSAY OF URINE CREATININE: CPT

## 2022-09-16 PROCEDURE — 81003 URINALYSIS AUTO W/O SCOPE: CPT

## 2022-09-16 PROCEDURE — 83970 ASSAY OF PARATHORMONE: CPT

## 2022-09-16 PROCEDURE — 80069 RENAL FUNCTION PANEL: CPT

## 2022-09-21 ENCOUNTER — TRANSCRIBE ORDERS (OUTPATIENT)
Dept: ADMINISTRATIVE | Facility: HOSPITAL | Age: 87
End: 2022-09-21

## 2022-09-21 DIAGNOSIS — E04.9 NON-TOXIC GOITER: Primary | ICD-10-CM

## 2022-09-21 DIAGNOSIS — Z12.31 VISIT FOR SCREENING MAMMOGRAM: ICD-10-CM

## 2022-10-06 NOTE — PROGRESS NOTES
Chief Complaint  Diarhhea, nausea and weight loss     Charlette Rai is a 87 y.o. female who presents to [facility]- [facility] on referral from No ref. provider found for a gastroenterology evaluation of diarhhea, nausea and weight loss       History of Present Illness  New patient presents to the office for diarrhea, anemia, and weight loss. About 3 months ago she started having diarrhea but it has slowly improved. She is currently having normal stool with occasional diarrhea about 2 times a month. Denies abdominal pain, constipation, melena, and hematochezia. She takes Dexilant daily which mostly controls heartburn. Denies nausea, vomiting, and dysphagia. She has had about 15 weight loss over the last 3 months.  Patient's most recent CBC shows a hemoglobin of 9.7.  Patient reports today feeling very dizzy and uneasy.  Denies palpitations, chest pain, and shortness of breath.    EGD 06/06/2019 by Dr. Casillas -normal esophagus, solid food in the stomach suggestive of gastroparesis, erythema in the stomach, evidence of prior gastric surgery, and normal duodenum.    Colonoscopy 10/13/2014 by Dr. Mims -normal mucosa throughout    Past Medical History:   Diagnosis Date   • Allergic rhinitis    • Anemia    • Arthritis    • Asthma     USE INHALERS AND NEBULIZERS   • Bladder disorder    • Cancer (HCC)     LEFT LUNG CANCER 2005 SURGERY AND CHEMO DONE  AND CURRENTLY 4/ 14/22  RIGHT LUNG CANCER HAS ONLY RECEIVED RADIATION THUS FAR   • Chronic kidney disease     stage 3   • CKD (chronic kidney disease) stage 4, GFR 15-29 ml/min ([facility])    • Condition not found     ulcer   • Congestive heart failure (CHF) ([facility])     FOLLOWED BY DR AQUINO. DENIES CP BUT DOES HAVE SOA CHRONIC ISSUE COPD/LUNG CANCER   • COPD (chronic obstructive pulmonary disease) ([facility])     FOLLOWED BY DR MARIAJOSE BAILEY   • Coronary artery disease     DENIES CP BUT DOES GET SOA MOST OF THE TIME WITH EXERTION BUT OCC AT REST CHRONIC ISSUE  COPD/LUNG CANCER   • Deep vein thrombosis (HCC)    • Diabetes mellitus (HCC)     DOES NOT CHECK BS DAILY   • Disease of thyroid gland     HYPOTHYROIDISM   • Essential hypertension    • Gastric ulcer    • GERD (gastroesophageal reflux disease)    • Heart murmur    • History of transfusion     NO ISSUES POST TRANSFUSION WAS MANY YEARS AGO   • Hyperlipemia    • Leukocytopenia     FOLLOWED BY DR MIR BAILEY   • Limb swelling    • Lumbago    • Lumbar spinal stenosis    • Lung cancer (HCC)    • Migraine headache    • Multiple joint pain    • Osteopenia    • PONV (postoperative nausea and vomiting)    • Reflux esophagitis    • Shortness of breath    • Thyroid nodule     HAS ULTRASOUND YEARLY BEING MONITORED   • Vascular disease    • Vitamin D deficiency        Past Surgical History:   Procedure Laterality Date   • ABDOMINAL SURGERY     • APPENDECTOMY     • ARTERIOVENOUS FISTULA/SHUNT SURGERY Left 05/10/2022    Procedure: Left basilic vein transposition;  Surgeon: Wan Barksdale MD;  Location: [facility] OR;  Service: Vascular;  Laterality: Left;   • CARDIAC CATHETERIZATION  1996   • CARDIAC SURGERY     • CARDIAC SURGERY      fluid drained from heart   • CATARACT EXTRACTION, BILATERAL  2003   • COLONOSCOPY  2014   • ENDOSCOPY  2016 2019   • FEMORAL ARTERY STENT Bilateral    • HYSTERECTOMY     • LUNG BIOPSY Left 2005    lobectomy upper lung caner   • LUNG VOLUME REDUCTION     • OTHER SURGICAL HISTORY      artifical joints/limbs   • REPLACEMENT TOTAL KNEE Left 2016   • UPPER GASTROINTESTINAL ENDOSCOPY           Current Outpatient Medications:   •  albuterol sulfate  (90 Base) MCG/ACT inhaler, Inhale 2 puffs Every 4 (Four) Hours As Needed for Wheezing., Disp: , Rfl:   •  albuterol sulfate  (90 Base) MCG/ACT inhaler, Every 4 (Four) Hours., Disp: , Rfl:   •  aspirin 81 MG chewable tablet, Chew 81 mg Daily., Disp: , Rfl:   •  atorvastatin (LIPITOR) 20 MG tablet, Take 20 mg by mouth Every Night., Disp: , Rfl:   •   cilostazol (PLETAL) 100 MG tablet, Take 1 tablet by mouth 2 (two) times a day. PER DR SAYRA PONCE TO CONTINUE PLETAL, Disp: , Rfl:   •  dexlansoprazole (DEXILANT) 60 MG capsule, , Disp: , Rfl:   •  Diclofenac Sodium (Voltaren) 1 % gel gel, Apply 4 g topically to the appropriate area as directed 4 (Four) Times a Day As Needed (pain)., Disp: 50 g, Rfl: 2  •  Dulaglutide (Trulicity) 1.5 MG/0.5ML solution pen-injector, Inject 1.5 mg under the skin into the appropriate area as directed Every 7 (Seven) Days., Disp: 6 mL, Rfl: 2  •  ergocalciferol (ERGOCALCIFEROL) 1.25 MG (18706 UT) capsule, Take 50,000 Units by mouth 1 (One) Time Per Week., Disp: , Rfl:   •  famotidine (PEPCID) 40 MG tablet, Take 1 tablet by mouth Daily., Disp: 90 tablet, Rfl: 1  •  fenofibrate (FENOGLIDE) 120 MG tablet, Take 1 tablet by mouth Daily., Disp: 90 tablet, Rfl: 1  •  ferrous sulfate 325 (65 FE) MG tablet, Take 1 tablet by mouth 2 (two) times a day., Disp: , Rfl:   •  furosemide (LASIX) 40 MG tablet, Take 80 mg by mouth. TAKE 2 TABLETS ON MON-WED-FRI, Disp: , Rfl:   •  glucose blood (OneTouch Verio) test strip, Use test strips to test blood sugar three times daily for diagnosis E11.9 Diabetes, Disp: 100 each, Rfl: 12  •  HYDROcodone-acetaminophen (NORCO) 5-325 MG per tablet, Take 1 tablet by mouth Every 6 (Six) Hours As Needed for Severe Pain ., Disp: 20 tablet, Rfl: 0  •  ipratropium-albuterol (DUO-NEB) 0.5-2.5 mg/3 ml nebulizer, INHALE 3ML (1 VIAL) VIA NEBULIZER EVERY 12 HOURS, Disp: , Rfl:   •  Lancets (onetouch ultrasoft) lancets, Use lancets to test blood sugar 3 times daily, Disp: 100 each, Rfl: 12  •  levocetirizine (XYZAL) 5 MG tablet, TAKE 1 TABLET DAILY, Disp: 90 tablet, Rfl: 1  •  losartan (COZAAR) 25 MG tablet, Take 1 tablet by mouth Daily., Disp: , Rfl:   •  metoprolol succinate XL (TOPROL-XL) 50 MG 24 hr tablet, TAKE 1 AND 1/2 TABLETS AT  BEDTIME, Disp: 135 tablet, Rfl: 1  •  nitroglycerin (NITROSTAT) 0.4 MG SL tablet, Nitrostat 0.4  "mg sublingual tablet, sublingual place 1 tablet (0.4 mg) by buccal route at the first sign of an attack; no more than 3 tabs are recommended within a 15 minute period.   Active, Disp: , Rfl:   •  OneTouch Ultra test strip, 1 each by Other route As Needed (blood sugar check). 4 times a day, Disp: 100 each, Rfl: 5  •  Symbicort 160-4.5 MCG/ACT inhaler, Inhale 2 puffs 2 (two) times a day., Disp: , Rfl:   •  traMADol (ULTRAM) 50 MG tablet, Take 1 tablet by mouth Daily As Needed (once daily as needed)., Disp: 90 tablet, Rfl: 0  •  amLODIPine (NORVASC) 5 MG tablet, TAKE 1 TABLET DAILY, Disp: 90 tablet, Rfl: 1  •  linagliptin (Tradjenta) 5 MG tablet tablet, Take 1 tablet by mouth Daily., Disp: 90 tablet, Rfl: 1  •  methylcellulose, Laxative, (CITRUCEL) 500 MG tablet tablet, Take 1,250 mg by mouth., Disp: , Rfl:      No Known Allergies    Family History   Problem Relation Age of Onset   • Arthritis Mother    • Arthritis Father    • Cancer Brother    • Diabetes Brother    • Other Brother         blood disease    • Prostate cancer Brother    • Cancer Brother    • Prostate cancer Brother    • Cancer Brother    • Cancer Brother    • Malig Hyperthermia Neg Hx    • Colon cancer Neg Hx         Social History     Social History Narrative   • Not on file       Immunization:  Immunization History   Administered Date(s) Administered   • COVID-19 (PFIZER) PURPLE CAP 03/07/2021, 04/04/2021, 10/31/2021   • Fluzone High Dose =>65 Years ([employer] ONLY) 10/09/2018, 10/15/2020, 10/12/2021   • Influenza, Unspecified 10/13/2021   • Pneumococcal Conjugate 13-Valent (PCV13) 10/21/2015   • Pneumococcal Polysaccharide (PPSV23) 10/30/2003, 11/08/2016        Objective     Vital Signs:   /48 (BP Location: Right arm, Patient Position: Sitting, Cuff Size: Adult)   Pulse 99   Ht 165.1 cm (65\")   Wt 68.5 kg (151 lb)   SpO2 97%   BMI 25.13 kg/m²       Physical Exam  Constitutional:       Appearance: Normal appearance.   HENT:      Head: " Normocephalic.   Cardiovascular:      Rate and Rhythm: Normal rate and regular rhythm.      Heart sounds: Normal heart sounds.   Pulmonary:      Effort: Pulmonary effort is normal.      Breath sounds: Normal breath sounds.   Abdominal:      General: Bowel sounds are normal.      Palpations: Abdomen is soft.   Skin:     General: Skin is warm and dry.   Neurological:      Mental Status: She is alert and oriented to person, place, and time. Mental status is at baseline.   Psychiatric:         Mood and Affect: Mood normal.         Behavior: Behavior normal.         Thought Content: Thought content normal.         Judgment: Judgment normal.         Result Review :     CBC w/diff    CBC w/Diff 6/3/22 7/11/22 9/16/22   WBC 5.27 5.21 6.57   RBC 3.35 (A) 3.52 (A) 3.12 (A)   Hemoglobin 10.8 (A) 11.3 (A) 9.8 (A)   Hematocrit 33.0 (A) 33.1 (A) 30.1 (A)   MCV 98.5 (A) 94.0 96.5   MCH 32.2 32.1 31.4   MCHC 32.7 34.1 32.6   RDW 11.8 (A) 12.1 (A) 13.1   Platelets 305 305 370   Neutrophil Rel % 63.4 62.2 66.8   Immature Granulocyte Rel % 0.4 0.4 0.3   Lymphocyte Rel % 16.1 (A) 18.0 (A) 14.6 (A)   Monocyte Rel % 16.5 (A) 15.9 (A) 14.5 (A)   Eosinophil Rel % 2.8 2.7 2.9   Basophil Rel % 0.8 0.8 0.9   (A) Abnormal value            CMP    CMP 6/3/22 7/11/22 9/16/22   Glucose 91 91 98   BUN 38 (A) 51 (A) 44 (A)   Creatinine 3.23 (A) 3.05 (A) 2.44 (A)   Sodium 139 143 142   Potassium 4.1 4.3 5.2   Chloride 100 101 104   Calcium 9.0 9.3 9.5   Albumin 4.00 3.90 4.00   (A) Abnormal value                    Assessment and Plan    Diagnoses and all orders for this visit:    1. Anemia, unspecified type (Primary)  -     Occult Blood, Fecal By Immunoassay - Stool, Per Rectum; Future  -     CBC Auto Differential; Future    2. Gastroesophageal reflux disease without esophagitis    3. Diarrhea, unspecified type    4. Weight loss      87-year-old patient presents to the office for anemia, diarrhea, and weight loss.  Patient's diarrhea has resolved  and reports only 2 episodes a month. Overall patient is doing much better from a GI standpoint.  Denies melena and hematochezia.  Due to change in bowel habits, anemia, and weight loss I advised patient to have colonoscopy, patient did not want to proceed at this time.  Discussed risk associated with deferring colonoscopy, patient expressed understanding.  Advised patient to go to the emergency department due to dizziness and uneasy feeling, patient declined.  I will recheck CBC and stool for occult blood.  Patient is agreeable to plan will call the office any questions or concerns.    Follow Up   No follow-ups on file.  Patient was given instructions and counseling regarding her condition or for health maintenance advice. Please see specific information pulled into the AVS if appropriate.

## 2022-10-07 ENCOUNTER — OFFICE VISIT (OUTPATIENT)
Dept: GASTROENTEROLOGY | Facility: CLINIC | Age: 87
End: 2022-10-07

## 2022-10-07 ENCOUNTER — LAB (OUTPATIENT)
Dept: LAB | Facility: HOSPITAL | Age: 87
End: 2022-10-07

## 2022-10-07 VITALS
HEIGHT: 65 IN | BODY MASS INDEX: 25.16 KG/M2 | WEIGHT: 151 LBS | SYSTOLIC BLOOD PRESSURE: 148 MMHG | DIASTOLIC BLOOD PRESSURE: 48 MMHG | OXYGEN SATURATION: 97 % | HEART RATE: 99 BPM

## 2022-10-07 DIAGNOSIS — D64.9 ANEMIA, UNSPECIFIED TYPE: Primary | ICD-10-CM

## 2022-10-07 DIAGNOSIS — D64.9 ANEMIA, UNSPECIFIED TYPE: ICD-10-CM

## 2022-10-07 DIAGNOSIS — R63.4 WEIGHT LOSS: ICD-10-CM

## 2022-10-07 DIAGNOSIS — R19.7 DIARRHEA, UNSPECIFIED TYPE: ICD-10-CM

## 2022-10-07 DIAGNOSIS — K21.9 GASTROESOPHAGEAL REFLUX DISEASE WITHOUT ESOPHAGITIS: ICD-10-CM

## 2022-10-07 LAB
BASOPHILS # BLD AUTO: 0.08 10*3/MM3 (ref 0–0.2)
BASOPHILS NFR BLD AUTO: 1.2 % (ref 0–1.5)
DEPRECATED RDW RBC AUTO: 42.1 FL (ref 37–54)
EOSINOPHIL # BLD AUTO: 0.2 10*3/MM3 (ref 0–0.4)
EOSINOPHIL NFR BLD AUTO: 2.9 % (ref 0.3–6.2)
ERYTHROCYTE [DISTWIDTH] IN BLOOD BY AUTOMATED COUNT: 12.2 % (ref 12.3–15.4)
HCT VFR BLD AUTO: 29.6 % (ref 34–46.6)
HGB BLD-MCNC: 9.9 G/DL (ref 12–15.9)
IMM GRANULOCYTES # BLD AUTO: 0.02 10*3/MM3 (ref 0–0.05)
IMM GRANULOCYTES NFR BLD AUTO: 0.3 % (ref 0–0.5)
LYMPHOCYTES # BLD AUTO: 0.87 10*3/MM3 (ref 0.7–3.1)
LYMPHOCYTES NFR BLD AUTO: 12.6 % (ref 19.6–45.3)
MCH RBC QN AUTO: 31.3 PG (ref 26.6–33)
MCHC RBC AUTO-ENTMCNC: 33.4 G/DL (ref 31.5–35.7)
MCV RBC AUTO: 93.7 FL (ref 79–97)
MONOCYTES # BLD AUTO: 0.84 10*3/MM3 (ref 0.1–0.9)
MONOCYTES NFR BLD AUTO: 12.2 % (ref 5–12)
NEUTROPHILS NFR BLD AUTO: 4.9 10*3/MM3 (ref 1.7–7)
NEUTROPHILS NFR BLD AUTO: 70.8 % (ref 42.7–76)
NRBC BLD AUTO-RTO: 0 /100 WBC (ref 0–0.2)
PLATELET # BLD AUTO: 286 10*3/MM3 (ref 140–450)
PMV BLD AUTO: 10 FL (ref 6–12)
RBC # BLD AUTO: 3.16 10*6/MM3 (ref 3.77–5.28)
WBC NRBC COR # BLD: 6.91 10*3/MM3 (ref 3.4–10.8)

## 2022-10-07 PROCEDURE — 85025 COMPLETE CBC W/AUTO DIFF WBC: CPT

## 2022-10-07 PROCEDURE — 99214 OFFICE O/P EST MOD 30 MIN: CPT

## 2022-10-07 PROCEDURE — 36415 COLL VENOUS BLD VENIPUNCTURE: CPT

## 2022-10-07 RX ORDER — DEXLANSOPRAZOLE 60 MG/1
60 CAPSULE, DELAYED RELEASE ORAL
COMMUNITY
Start: 2022-09-23

## 2022-10-07 RX ORDER — LOSARTAN POTASSIUM 25 MG/1
25 TABLET ORAL
COMMUNITY
End: 2022-10-07

## 2022-10-07 RX ORDER — ATORVASTATIN CALCIUM 20 MG/1
20 TABLET, FILM COATED ORAL
COMMUNITY
End: 2022-10-07

## 2022-10-07 RX ORDER — IPRATROPIUM BROMIDE AND ALBUTEROL SULFATE 2.5; .5 MG/3ML; MG/3ML
SOLUTION RESPIRATORY (INHALATION)
COMMUNITY
Start: 2022-09-13

## 2022-10-12 ENCOUNTER — LAB (OUTPATIENT)
Dept: LAB | Facility: HOSPITAL | Age: 87
End: 2022-10-12

## 2022-10-12 DIAGNOSIS — D64.9 ANEMIA, UNSPECIFIED TYPE: ICD-10-CM

## 2022-10-12 LAB — HEMOCCULT STL QL IA: NEGATIVE

## 2022-10-12 PROCEDURE — 82274 ASSAY TEST FOR BLOOD FECAL: CPT

## 2022-10-14 ENCOUNTER — TELEPHONE (OUTPATIENT)
Dept: GASTROENTEROLOGY | Facility: CLINIC | Age: 87
End: 2022-10-14

## 2022-10-14 NOTE — TELEPHONE ENCOUNTER
----- Message from DAT Argueta sent at 10/13/2022  8:46 PM EDT -----  Please notify patient that stool is negative for blood. Please advise patient to continue trending anemia with PCP.

## 2022-11-08 DIAGNOSIS — D64.9 ANEMIA, UNSPECIFIED TYPE: Primary | ICD-10-CM

## 2022-11-09 ENCOUNTER — LAB (OUTPATIENT)
Dept: LAB | Facility: HOSPITAL | Age: 87
End: 2022-11-09

## 2022-11-09 DIAGNOSIS — D64.9 ANEMIA, UNSPECIFIED TYPE: ICD-10-CM

## 2022-11-09 LAB
BASOPHILS # BLD AUTO: 0.1 10*3/MM3 (ref 0–0.2)
BASOPHILS NFR BLD AUTO: 1.4 % (ref 0–1.5)
DEPRECATED RDW RBC AUTO: 41.1 FL (ref 37–54)
EOSINOPHIL # BLD AUTO: 0.31 10*3/MM3 (ref 0–0.4)
EOSINOPHIL NFR BLD AUTO: 4.3 % (ref 0.3–6.2)
ERYTHROCYTE [DISTWIDTH] IN BLOOD BY AUTOMATED COUNT: 11.9 % (ref 12.3–15.4)
HCT VFR BLD AUTO: 29.8 % (ref 34–46.6)
HGB BLD-MCNC: 10 G/DL (ref 12–15.9)
IMM GRANULOCYTES # BLD AUTO: 0.02 10*3/MM3 (ref 0–0.05)
IMM GRANULOCYTES NFR BLD AUTO: 0.3 % (ref 0–0.5)
LYMPHOCYTES # BLD AUTO: 0.77 10*3/MM3 (ref 0.7–3.1)
LYMPHOCYTES NFR BLD AUTO: 10.6 % (ref 19.6–45.3)
MCH RBC QN AUTO: 31.9 PG (ref 26.6–33)
MCHC RBC AUTO-ENTMCNC: 33.6 G/DL (ref 31.5–35.7)
MCV RBC AUTO: 95.2 FL (ref 79–97)
MONOCYTES # BLD AUTO: 1.16 10*3/MM3 (ref 0.1–0.9)
MONOCYTES NFR BLD AUTO: 16 % (ref 5–12)
NEUTROPHILS NFR BLD AUTO: 4.9 10*3/MM3 (ref 1.7–7)
NEUTROPHILS NFR BLD AUTO: 67.4 % (ref 42.7–76)
NRBC BLD AUTO-RTO: 0 /100 WBC (ref 0–0.2)
PLATELET # BLD AUTO: 338 10*3/MM3 (ref 140–450)
PMV BLD AUTO: 9.7 FL (ref 6–12)
RBC # BLD AUTO: 3.13 10*6/MM3 (ref 3.77–5.28)
WBC NRBC COR # BLD: 7.26 10*3/MM3 (ref 3.4–10.8)

## 2022-11-09 PROCEDURE — 85025 COMPLETE CBC W/AUTO DIFF WBC: CPT

## 2022-11-09 PROCEDURE — 36415 COLL VENOUS BLD VENIPUNCTURE: CPT

## 2022-11-11 ENCOUNTER — TELEPHONE (OUTPATIENT)
Dept: GASTROENTEROLOGY | Facility: CLINIC | Age: 87
End: 2022-11-11

## 2022-11-11 NOTE — TELEPHONE ENCOUNTER
----- Message from DAT Argueta sent at 11/10/2022  7:40 AM EST -----  I have reviewed the patient's most recent CBC which shows that hemoglobin is trending up at 10.0. Please maintain follow up with PCP to further trend anemia.

## 2022-11-11 NOTE — TELEPHONE ENCOUNTER
I spoke with pt. She is aware of results. Pt states she has a follow up with PCP and will maintain appointment.

## 2022-11-18 ENCOUNTER — APPOINTMENT (OUTPATIENT)
Dept: ULTRASOUND IMAGING | Facility: HOSPITAL | Age: 87
End: 2022-11-18

## 2022-11-18 ENCOUNTER — APPOINTMENT (OUTPATIENT)
Dept: MAMMOGRAPHY | Facility: HOSPITAL | Age: 87
End: 2022-11-18

## 2022-11-30 ENCOUNTER — OFFICE VISIT (OUTPATIENT)
Dept: VASCULAR SURGERY | Facility: HOSPITAL | Age: 87
End: 2022-11-30

## 2022-11-30 VITALS
SYSTOLIC BLOOD PRESSURE: 162 MMHG | TEMPERATURE: 97.5 F | OXYGEN SATURATION: 98 % | HEART RATE: 91 BPM | DIASTOLIC BLOOD PRESSURE: 60 MMHG | RESPIRATION RATE: 14 BRPM

## 2022-11-30 DIAGNOSIS — N18.9 CHRONIC KIDNEY DISEASE, UNSPECIFIED CKD STAGE: Primary | ICD-10-CM

## 2022-11-30 PROCEDURE — G0463 HOSPITAL OUTPT CLINIC VISIT: HCPCS | Performed by: SURGERY

## 2022-11-30 PROCEDURE — 99212 OFFICE O/P EST SF 10 MIN: CPT | Performed by: SURGERY

## 2022-11-30 NOTE — PROGRESS NOTES
[facility]   Follow up Office    Patient Name: Charlette Rai  : 1935  MRN: [id number]  Primary Care Physician:  Clyde Lainez MD      Subjective   Subjective     HPI:    Charlette Rai is a 87 y.o. female here for follow-up after left basilic vein transposition 5/10/2022.  Doing well.  Not yet on dialysis.      Objective     Vitals:   Temp:  [97.5 °F (36.4 °C)] 97.5 °F (36.4 °C)  Heart Rate:  [91] 91  Resp:  [14] 14  BP: (162)/(60) 162/60    Physical Exam      General: Alert, no acute distress  Wound: Well-healed.  AV fistula: Excellent bruit and thrill.    Assessment & Plan   Assessment / Plan     Diagnoses and all orders for this visit:    1. Chronic kidney disease, unspecified CKD stage (Primary)       Assessment/Plan:   Satisfactory progress following left basilic vein transposition.  The left arm fistula is ready to be used at any time.  Follow-up in 3 months to continue to recheck the fistula while not on dialysis.        Electronically signed by Wan Barksdale MD, 22, 10:54 AM EST.

## 2022-12-13 ENCOUNTER — OFFICE VISIT (OUTPATIENT)
Dept: CARDIOLOGY | Facility: CLINIC | Age: 87
End: 2022-12-13

## 2022-12-13 VITALS
DIASTOLIC BLOOD PRESSURE: 54 MMHG | BODY MASS INDEX: 24.49 KG/M2 | WEIGHT: 147 LBS | HEIGHT: 65 IN | HEART RATE: 91 BPM | SYSTOLIC BLOOD PRESSURE: 149 MMHG

## 2022-12-13 DIAGNOSIS — I73.9 PERIPHERAL VASCULAR DISEASE OF LOWER EXTREMITY: ICD-10-CM

## 2022-12-13 DIAGNOSIS — I65.29 STENOSIS OF CAROTID ARTERY, UNSPECIFIED LATERALITY: ICD-10-CM

## 2022-12-13 DIAGNOSIS — I10 ESSENTIAL HYPERTENSION: ICD-10-CM

## 2022-12-13 DIAGNOSIS — E78.5 HYPERLIPIDEMIA LDL GOAL <70: Primary | ICD-10-CM

## 2022-12-13 PROCEDURE — 99214 OFFICE O/P EST MOD 30 MIN: CPT | Performed by: INTERNAL MEDICINE

## 2022-12-13 RX ORDER — AMLODIPINE BESYLATE 10 MG/1
10 TABLET ORAL DAILY
Qty: 90 TABLET | Refills: 3 | Status: SHIPPED | OUTPATIENT
Start: 2022-12-13

## 2022-12-13 NOTE — PROGRESS NOTES
Chief Complaint  stenosis of carotid artery, Follow-up, Hyperlipidemia, and Hypertension    Subjective    Patient has been having some left hand numbness and bluish discoloration.  She reports stable breathing and no symptoms of anginal chest pain.  Patient does not report any hand pain issues.    Past Medical History:   Diagnosis Date   • Allergic rhinitis    • Anemia    • Arthritis    • Asthma     USE INHALERS AND NEBULIZERS   • Bladder disorder    • Cancer (HCC)     LEFT LUNG CANCER 2005 SURGERY AND CHEMO DONE  AND CURRENTLY 4/ 14/22  RIGHT LUNG CANCER HAS ONLY RECEIVED RADIATION THUS FAR   • Chronic kidney disease     stage 3   • CKD (chronic kidney disease) stage 4, GFR 15-29 ml/min ([facility])    • Condition not found     ulcer   • Congestive heart failure (CHF) ([facility])     FOLLOWED BY DR AQUINO. DENIES CP BUT DOES HAVE SOA CHRONIC ISSUE COPD/LUNG CANCER   • COPD (chronic obstructive pulmonary disease) ([facility])     FOLLOWED BY DR MARIAJOSE BAILEY   • Coronary artery disease     DENIES CP BUT DOES GET SOA MOST OF THE TIME WITH EXERTION BUT OCC AT REST CHRONIC ISSUE COPD/LUNG CANCER   • Deep vein thrombosis ([facility])    • Diabetes mellitus ([facility])     DOES NOT CHECK BS DAILY   • Disease of thyroid gland     HYPOTHYROIDISM   • Essential hypertension    • Gastric ulcer    • GERD (gastroesophageal reflux disease)    • Heart murmur    • History of transfusion     NO ISSUES POST TRANSFUSION WAS MANY YEARS AGO   • Hyperlipemia    • Leukocytopenia     FOLLOWED BY DR MIR BAILEY   • Limb swelling    • Lumbago    • Lumbar spinal stenosis    • Lung cancer ([facility])    • Migraine headache    • Multiple joint pain    • Osteopenia    • PONV (postoperative nausea and vomiting)    • Reflux esophagitis    • Shortness of breath    • Thyroid nodule     HAS ULTRASOUND YEARLY BEING MONITORED   • Vascular disease    • Vitamin D deficiency          Current Outpatient Medications:   •  albuterol sulfate  (90 Base) MCG/ACT inhaler, Inhale 2 puffs Every 4  (Four) Hours As Needed for Wheezing., Disp: , Rfl:   •  aspirin 81 MG chewable tablet, Chew 81 mg Daily., Disp: , Rfl:   •  cilostazol (PLETAL) 100 MG tablet, Take 1 tablet by mouth 2 (two) times a day. PER DR SAYRA PONCE TO CONTINUE PLETAL, Disp: , Rfl:   •  dexlansoprazole (DEXILANT) 60 MG capsule, , Disp: , Rfl:   •  Diclofenac Sodium (Voltaren) 1 % gel gel, Apply 4 g topically to the appropriate area as directed 4 (Four) Times a Day As Needed (pain)., Disp: 50 g, Rfl: 2  •  Dulaglutide (Trulicity) 1.5 MG/0.5ML solution pen-injector, Inject 1.5 mg under the skin into the appropriate area as directed Every 7 (Seven) Days., Disp: 6 mL, Rfl: 2  •  ergocalciferol (ERGOCALCIFEROL) 1.25 MG (39079 UT) capsule, Take 50,000 Units by mouth 1 (One) Time Per Week., Disp: , Rfl:   •  famotidine (PEPCID) 40 MG tablet, Take 1 tablet by mouth Daily., Disp: 90 tablet, Rfl: 1  •  fenofibrate (FENOGLIDE) 120 MG tablet, Take 1 tablet by mouth Daily., Disp: 90 tablet, Rfl: 1  •  furosemide (LASIX) 40 MG tablet, Take 80 mg by mouth. TAKE 2 TABLETS ON MON-WED-FRI, Disp: , Rfl:   •  glucose blood (OneTouch Verio) test strip, Use test strips to test blood sugar three times daily for diagnosis E11.9 Diabetes, Disp: 100 each, Rfl: 12  •  ipratropium-albuterol (DUO-NEB) 0.5-2.5 mg/3 ml nebulizer, INHALE 3ML (1 VIAL) VIA NEBULIZER EVERY 12 HOURS, Disp: , Rfl:   •  Lancets (onetouch ultrasoft) lancets, Use lancets to test blood sugar 3 times daily, Disp: 100 each, Rfl: 12  •  levocetirizine (XYZAL) 5 MG tablet, TAKE 1 TABLET DAILY, Disp: 90 tablet, Rfl: 1  •  linagliptin (Tradjenta) 5 MG tablet tablet, Take 1 tablet by mouth Daily., Disp: 90 tablet, Rfl: 1  •  methylcellulose, Laxative, (CITRUCEL) 500 MG tablet tablet, Take 1,250 mg by mouth., Disp: , Rfl:   •  metoprolol succinate XL (TOPROL-XL) 50 MG 24 hr tablet, TAKE 1 AND 1/2 TABLETS AT  BEDTIME (Patient taking differently: 100 mg.), Disp: 135 tablet, Rfl: 1  •  nitroglycerin (NITROSTAT) 0.4  MG SL tablet, Nitrostat 0.4 mg sublingual tablet, sublingual place 1 tablet (0.4 mg) by buccal route at the first sign of an attack; no more than 3 tabs are recommended within a 15 minute period.   Active, Disp: , Rfl:   •  OneTouch Ultra test strip, 1 each by Other route As Needed (blood sugar check). 4 times a day, Disp: 100 each, Rfl: 5  •  Symbicort 160-4.5 MCG/ACT inhaler, Inhale 2 puffs 2 (two) times a day., Disp: , Rfl:   •  amLODIPine (NORVASC) 10 MG tablet, Take 1 tablet by mouth Daily., Disp: 90 tablet, Rfl: 3  •  atorvastatin (LIPITOR) 20 MG tablet, Take 20 mg by mouth Every Night., Disp: , Rfl:   •  ferrous sulfate 325 (65 FE) MG tablet, Take 1 tablet by mouth 2 (two) times a day., Disp: , Rfl:   •  HYDROcodone-acetaminophen (NORCO) 5-325 MG per tablet, Take 1 tablet by mouth Every 6 (Six) Hours As Needed for Severe Pain ., Disp: 20 tablet, Rfl: 0  •  traMADol (ULTRAM) 50 MG tablet, Take 1 tablet by mouth Daily As Needed (once daily as needed)., Disp: 90 tablet, Rfl: 0    Medications Discontinued During This Encounter   Medication Reason   • albuterol sulfate  (90 Base) MCG/ACT inhaler Duplicate order   • losartan (COZAAR) 25 MG tablet Discontinued by another clinician   • amLODIPine (NORVASC) 5 MG tablet      No Known Allergies     Social History     Tobacco Use   • Smoking status: Former     Packs/day: 1.00     Years: 30.00     Pack years: 30.00     Types: Cigarettes     Start date:      Quit date: 2004     Years since quittin.4   • Smokeless tobacco: Never   Vaping Use   • Vaping Use: Never used   Substance Use Topics   • Alcohol use: Never   • Drug use: Never       Family History   Problem Relation Age of Onset   • Arthritis Mother    • Arthritis Father    • Cancer Brother    • Diabetes Brother    • Other Brother         blood disease    • Prostate cancer Brother    • Cancer Brother    • Prostate cancer Brother    • Cancer Brother    • Cancer Brother    • Malig Hyperthermia Neg  "Hx    • Colon cancer Neg Hx         Objective     /54   Pulse 91   Ht 165.1 cm (65\")   Wt 66.7 kg (147 lb)   BMI 24.46 kg/m²       Physical Exam    General Appearance:   · no acute distress  · Alert and oriented x3  HENT:   · lips not cyanotic  · Atraumatic  Neck:  · No jvd   · supple  Respiratory:  · no respiratory distress  · normal breath sounds  · no rales  Cardiovascular:  · Regular rate and rhythm  · no S3, no S4   · no murmur  · no rub  Extremities  · No cyanosis  · lower extremity edema: none    · Diminished pulses in her left radial artery and discoloration distal fingers slightly bluish in discoloration  Skin:   · warm, dry  · No rashes      Result Review :     No results found for: PROBNP  CMP    CMP 6/3/22 7/11/22 9/16/22   Glucose 91 91 98   BUN 38 (A) 51 (A) 44 (A)   Creatinine 3.23 (A) 3.05 (A) 2.44 (A)   Sodium 139 143 142   Potassium 4.1 4.3 5.2   Chloride 100 101 104   Calcium 9.0 9.3 9.5   Albumin 4.00 3.90 4.00   (A) Abnormal value            CBC w/diff    CBC w/Diff 9/16/22 10/7/22 11/9/22   WBC 6.57 6.91 7.26   RBC 3.12 (A) 3.16 (A) 3.13 (A)   Hemoglobin 9.8 (A) 9.9 (A) 10.0 (A)   Hematocrit 30.1 (A) 29.6 (A) 29.8 (A)   MCV 96.5 93.7 95.2   MCH 31.4 31.3 31.9   MCHC 32.6 33.4 33.6   RDW 13.1 12.2 (A) 11.9 (A)   Platelets 370 286 338   Neutrophil Rel % 66.8 70.8 67.4   Immature Granulocyte Rel % 0.3 0.3 0.3   Lymphocyte Rel % 14.6 (A) 12.6 (A) 10.6 (A)   Monocyte Rel % 14.5 (A) 12.2 (A) 16.0 (A)   Eosinophil Rel % 2.9 2.9 4.3   Basophil Rel % 0.9 1.2 1.4   (A) Abnormal value             Lab Results   Component Value Date    TSH 1.940 08/25/2020      Lab Results   Component Value Date    FREET4 1.4 08/25/2020      No results found for: DDIMERQUANT  No results found for: MG   No results found for: DIGOXIN   Lab Results   Component Value Date    TROPONINT 0.02 05/03/2021           Lipid Panel    Lipid Panel 1/10/22   Total Cholesterol 225 (A)   Triglycerides 253 (A)   HDL Cholesterol 44 "   VLDL Cholesterol 45 (A)   LDL Cholesterol  136 (A)   LDL/HDL Ratio 2.96   (A) Abnormal value            No results found for: POCTROP                   Diagnoses and all orders for this visit:    1. Hyperlipidemia LDL goal <70 (Primary)  -     Lipid Panel; Future  -     Hepatic Function Panel; Future    2. Stenosis of carotid artery, unspecified laterality  Assessment & Plan:  Continue with chronic Aspir 81 daily patient is following with vascular surgery      3. Peripheral vascular disease of lower extremity (HCC)  Assessment & Plan:  Patient with no claudication symptoms does have some discoloration in her left upper extremity after fistula placement set to follow-up with vascular surgery next week she is on chronic aspirin 81 mg and Pletal 100 twice daily dosing.      4. Essential hypertension  Assessment & Plan:  Increase lisinopril to 20 mg daily      Other orders  -     amLODIPine (NORVASC) 10 MG tablet; Take 1 tablet by mouth Daily.  Dispense: 90 tablet; Refill: 3          Follow Up     Return in about 6 months (around 6/13/2023).          Patient was given instructions and counseling regarding her condition or for health maintenance advice. Please see specific information pulled into the AVS if appropriate.

## 2022-12-13 NOTE — ASSESSMENT & PLAN NOTE
Patient with no claudication symptoms does have some discoloration in her left upper extremity after fistula placement set to follow-up with vascular surgery next week she is on chronic aspirin 81 mg and Pletal 100 twice daily dosing.

## 2022-12-23 ENCOUNTER — HOSPITAL ENCOUNTER (OUTPATIENT)
Dept: ULTRASOUND IMAGING | Facility: HOSPITAL | Age: 87
End: 2022-12-23

## 2022-12-23 ENCOUNTER — APPOINTMENT (OUTPATIENT)
Dept: MAMMOGRAPHY | Facility: HOSPITAL | Age: 87
End: 2022-12-23

## 2022-12-26 DIAGNOSIS — E11.9 TYPE 2 DIABETES MELLITUS WITHOUT COMPLICATION, WITHOUT LONG-TERM CURRENT USE OF INSULIN: ICD-10-CM

## 2022-12-27 RX ORDER — DULAGLUTIDE 1.5 MG/.5ML
INJECTION, SOLUTION SUBCUTANEOUS
Qty: 6 ML | Refills: 2 | Status: SHIPPED | OUTPATIENT
Start: 2022-12-27

## 2022-12-27 RX ORDER — LEVOCETIRIZINE DIHYDROCHLORIDE 5 MG/1
TABLET, FILM COATED ORAL
Qty: 90 TABLET | Refills: 1 | Status: SHIPPED | OUTPATIENT
Start: 2022-12-27

## 2022-12-28 ENCOUNTER — HOSPITAL ENCOUNTER (OUTPATIENT)
Dept: ULTRASOUND IMAGING | Facility: HOSPITAL | Age: 87
Discharge: HOME | End: 2022-12-28
Payer: MEDICARE

## 2022-12-28 ENCOUNTER — LAB (OUTPATIENT)
Dept: LAB | Facility: HOSPITAL | Age: 87
End: 2022-12-28
Payer: MEDICARE

## 2022-12-28 ENCOUNTER — HOSPITAL ENCOUNTER (OUTPATIENT)
Dept: MAMMOGRAPHY | Facility: HOSPITAL | Age: 87
Discharge: HOME | End: 2022-12-28
Payer: MEDICARE

## 2022-12-28 ENCOUNTER — TRANSCRIBE ORDERS (OUTPATIENT)
Dept: ADMINISTRATIVE | Facility: HOSPITAL | Age: 87
End: 2022-12-28
Payer: MEDICARE

## 2022-12-28 DIAGNOSIS — N18.4 CHRONIC KIDNEY DISEASE, STAGE IV (SEVERE): ICD-10-CM

## 2022-12-28 DIAGNOSIS — N18.6 ANEMIA IN END-STAGE RENAL DISEASE: ICD-10-CM

## 2022-12-28 DIAGNOSIS — D63.1 ANEMIA IN END-STAGE RENAL DISEASE: ICD-10-CM

## 2022-12-28 DIAGNOSIS — Z12.31 VISIT FOR SCREENING MAMMOGRAM: ICD-10-CM

## 2022-12-28 DIAGNOSIS — N18.4 CHRONIC KIDNEY DISEASE, STAGE IV (SEVERE): Primary | ICD-10-CM

## 2022-12-28 DIAGNOSIS — E78.5 HYPERLIPIDEMIA LDL GOAL <70: ICD-10-CM

## 2022-12-28 DIAGNOSIS — E04.9 NON-TOXIC GOITER: ICD-10-CM

## 2022-12-28 LAB
25(OH)D3 SERPL-MCNC: 41.1 NG/ML (ref 30–100)
ALBUMIN SERPL-MCNC: 4 G/DL (ref 3.5–5.2)
ALP SERPL-CCNC: 95 U/L (ref 39–117)
ALT SERPL W P-5'-P-CCNC: 9 U/L (ref 1–33)
ANION GAP SERPL CALCULATED.3IONS-SCNC: 10.2 MMOL/L (ref 5–15)
AST SERPL-CCNC: 22 U/L (ref 1–32)
BASOPHILS # BLD AUTO: 0.05 10*3/MM3 (ref 0–0.2)
BASOPHILS NFR BLD AUTO: 0.7 % (ref 0–1.5)
BILIRUB CONJ SERPL-MCNC: <0.2 MG/DL (ref 0–0.3)
BILIRUB INDIRECT SERPL-MCNC: NORMAL MG/DL
BILIRUB SERPL-MCNC: 0.2 MG/DL (ref 0–1.2)
BUN SERPL-MCNC: 59 MG/DL (ref 8–23)
BUN/CREAT SERPL: 22.4 (ref 7–25)
CALCIUM SPEC-SCNC: 9.1 MG/DL (ref 8.6–10.5)
CHLORIDE SERPL-SCNC: 106 MMOL/L (ref 98–107)
CHOLEST SERPL-MCNC: 177 MG/DL (ref 0–200)
CO2 SERPL-SCNC: 24.8 MMOL/L (ref 22–29)
CREAT SERPL-MCNC: 2.63 MG/DL (ref 0.57–1)
DEPRECATED RDW RBC AUTO: 45.1 FL (ref 37–54)
EGFRCR SERPLBLD CKD-EPI 2021: 17.3 ML/MIN/1.73
EOSINOPHIL # BLD AUTO: 0.16 10*3/MM3 (ref 0–0.4)
EOSINOPHIL NFR BLD AUTO: 2.2 % (ref 0.3–6.2)
ERYTHROCYTE [DISTWIDTH] IN BLOOD BY AUTOMATED COUNT: 12.9 % (ref 12.3–15.4)
FERRITIN SERPL-MCNC: 1633 NG/ML (ref 13–150)
GLUCOSE SERPL-MCNC: 99 MG/DL (ref 65–99)
HCT VFR BLD AUTO: 30.1 % (ref 34–46.6)
HDLC SERPL-MCNC: 43 MG/DL (ref 40–60)
HGB BLD-MCNC: 10 G/DL (ref 12–15.9)
IMM GRANULOCYTES # BLD AUTO: 0.02 10*3/MM3 (ref 0–0.05)
IMM GRANULOCYTES NFR BLD AUTO: 0.3 % (ref 0–0.5)
IRON 24H UR-MRATE: 107 MCG/DL (ref 37–145)
IRON SATN MFR SERPL: 26 % (ref 20–50)
LDLC SERPL CALC-MCNC: 106 MG/DL (ref 0–100)
LDLC/HDLC SERPL: 2.37 {RATIO}
LYMPHOCYTES # BLD AUTO: 0.8 10*3/MM3 (ref 0.7–3.1)
LYMPHOCYTES NFR BLD AUTO: 11.1 % (ref 19.6–45.3)
MCH RBC QN AUTO: 31.6 PG (ref 26.6–33)
MCHC RBC AUTO-ENTMCNC: 33.2 G/DL (ref 31.5–35.7)
MCV RBC AUTO: 95.3 FL (ref 79–97)
MONOCYTES # BLD AUTO: 0.9 10*3/MM3 (ref 0.1–0.9)
MONOCYTES NFR BLD AUTO: 12.5 % (ref 5–12)
NEUTROPHILS NFR BLD AUTO: 5.27 10*3/MM3 (ref 1.7–7)
NEUTROPHILS NFR BLD AUTO: 73.2 % (ref 42.7–76)
NRBC BLD AUTO-RTO: 0 /100 WBC (ref 0–0.2)
PHOSPHATE SERPL-MCNC: 4.3 MG/DL (ref 2.5–4.5)
PLATELET # BLD AUTO: 307 10*3/MM3 (ref 140–450)
PMV BLD AUTO: 9.6 FL (ref 6–12)
POTASSIUM SERPL-SCNC: 4.8 MMOL/L (ref 3.5–5.2)
PROT SERPL-MCNC: 6.5 G/DL (ref 6–8.5)
PTH-INTACT SERPL-MCNC: 68 PG/ML (ref 15–65)
RBC # BLD AUTO: 3.16 10*6/MM3 (ref 3.77–5.28)
SODIUM SERPL-SCNC: 141 MMOL/L (ref 136–145)
TIBC SERPL-MCNC: 414 MCG/DL (ref 298–536)
TRANSFERRIN SERPL-MCNC: 278 MG/DL (ref 200–360)
TRIGL SERPL-MCNC: 161 MG/DL (ref 0–150)
VLDLC SERPL-MCNC: 28 MG/DL (ref 5–40)
WBC NRBC COR # BLD: 7.2 10*3/MM3 (ref 3.4–10.8)

## 2022-12-28 PROCEDURE — 83540 ASSAY OF IRON: CPT

## 2022-12-28 PROCEDURE — 80048 BASIC METABOLIC PNL TOTAL CA: CPT

## 2022-12-28 PROCEDURE — 76536 US EXAM OF HEAD AND NECK: CPT

## 2022-12-28 PROCEDURE — 84100 ASSAY OF PHOSPHORUS: CPT

## 2022-12-28 PROCEDURE — 77063 BREAST TOMOSYNTHESIS BI: CPT

## 2022-12-28 PROCEDURE — 85025 COMPLETE CBC W/AUTO DIFF WBC: CPT

## 2022-12-28 PROCEDURE — 77067 SCR MAMMO BI INCL CAD: CPT

## 2022-12-28 PROCEDURE — 84466 ASSAY OF TRANSFERRIN: CPT

## 2022-12-28 PROCEDURE — 36415 COLL VENOUS BLD VENIPUNCTURE: CPT

## 2022-12-28 PROCEDURE — 80061 LIPID PANEL: CPT

## 2022-12-28 PROCEDURE — 80076 HEPATIC FUNCTION PANEL: CPT

## 2022-12-28 PROCEDURE — 82306 VITAMIN D 25 HYDROXY: CPT

## 2022-12-28 PROCEDURE — 82728 ASSAY OF FERRITIN: CPT

## 2022-12-28 PROCEDURE — 83970 ASSAY OF PARATHORMONE: CPT

## 2022-12-29 ENCOUNTER — TELEPHONE (OUTPATIENT)
Dept: CARDIOLOGY | Facility: CLINIC | Age: 87
End: 2022-12-29

## 2022-12-29 RX ORDER — ATORVASTATIN CALCIUM 40 MG/1
40 TABLET, FILM COATED ORAL NIGHTLY
Qty: 90 TABLET | Refills: 3 | Status: SHIPPED | OUTPATIENT
Start: 2022-12-29

## 2022-12-29 NOTE — TELEPHONE ENCOUNTER
----- Message from DAT Sears sent at 12/28/2022  5:43 PM EST -----  Notify pt her LDL is not to goal of less than 70, increase atorvastatin dose to 40 mg nightly and repeat fasting lipid and hepatic function panel. Let her know I have forwarded her renal function labs to Dr García for review. Her sodium and potassium levels are in normal range.

## 2023-01-11 ENCOUNTER — TRANSCRIBE ORDERS (OUTPATIENT)
Dept: ADMINISTRATIVE | Facility: HOSPITAL | Age: 88
End: 2023-01-11
Payer: MEDICARE

## 2023-01-11 ENCOUNTER — HOSPITAL ENCOUNTER (OUTPATIENT)
Dept: GENERAL RADIOLOGY | Facility: HOSPITAL | Age: 88
Discharge: HOME | End: 2023-01-11
Admitting: INTERNAL MEDICINE
Payer: MEDICARE

## 2023-01-11 DIAGNOSIS — C34.12 MALIGNANT NEOPLASM OF UPPER LOBE OF LEFT LUNG: Primary | ICD-10-CM

## 2023-01-11 DIAGNOSIS — J44.9 CHRONIC OBSTRUCTIVE PULMONARY DISEASE, UNSPECIFIED COPD TYPE: ICD-10-CM

## 2023-01-11 DIAGNOSIS — J44.9 CHRONIC OBSTRUCTIVE PULMONARY DISEASE, UNSPECIFIED COPD TYPE: Primary | ICD-10-CM

## 2023-01-11 PROCEDURE — 71046 X-RAY EXAM CHEST 2 VIEWS: CPT

## 2023-01-19 ENCOUNTER — HOSPITAL ENCOUNTER (OUTPATIENT)
Dept: GENERAL RADIOLOGY | Facility: HOSPITAL | Age: 88
Discharge: HOME | End: 2023-01-19
Admitting: NURSE PRACTITIONER
Payer: MEDICARE

## 2023-01-19 ENCOUNTER — TRANSCRIBE ORDERS (OUTPATIENT)
Dept: ADMINISTRATIVE | Facility: HOSPITAL | Age: 88
End: 2023-01-19
Payer: MEDICARE

## 2023-01-19 DIAGNOSIS — R53.1 ASTHENIA: ICD-10-CM

## 2023-01-19 DIAGNOSIS — M79.602 LEFT ARM PAIN: ICD-10-CM

## 2023-01-19 DIAGNOSIS — R53.1 ASTHENIA: Primary | ICD-10-CM

## 2023-01-19 PROCEDURE — 72040 X-RAY EXAM NECK SPINE 2-3 VW: CPT

## 2023-02-10 ENCOUNTER — LAB (OUTPATIENT)
Dept: LAB | Facility: HOSPITAL | Age: 88
End: 2023-02-10
Payer: MEDICARE

## 2023-02-10 ENCOUNTER — TRANSCRIBE ORDERS (OUTPATIENT)
Dept: LAB | Facility: HOSPITAL | Age: 88
End: 2023-02-10
Payer: MEDICARE

## 2023-02-10 DIAGNOSIS — N18.4 CHRONIC KIDNEY DISEASE, STAGE IV (SEVERE): Primary | ICD-10-CM

## 2023-02-10 DIAGNOSIS — N18.4 CHRONIC KIDNEY DISEASE, STAGE IV (SEVERE): ICD-10-CM

## 2023-02-10 LAB
ALBUMIN SERPL-MCNC: 4.3 G/DL (ref 3.5–5.2)
ANION GAP SERPL CALCULATED.3IONS-SCNC: 12.3 MMOL/L (ref 5–15)
BASOPHILS # BLD AUTO: 0.05 10*3/MM3 (ref 0–0.2)
BASOPHILS NFR BLD AUTO: 0.9 % (ref 0–1.5)
BUN SERPL-MCNC: 58 MG/DL (ref 8–23)
BUN/CREAT SERPL: 21.5 (ref 7–25)
CALCIUM SPEC-SCNC: 9 MG/DL (ref 8.6–10.5)
CHLORIDE SERPL-SCNC: 105 MMOL/L (ref 98–107)
CO2 SERPL-SCNC: 26.7 MMOL/L (ref 22–29)
CREAT SERPL-MCNC: 2.7 MG/DL (ref 0.57–1)
DEPRECATED RDW RBC AUTO: 42.7 FL (ref 37–54)
EGFRCR SERPLBLD CKD-EPI 2021: 16.8 ML/MIN/1.73
EOSINOPHIL # BLD AUTO: 0.18 10*3/MM3 (ref 0–0.4)
EOSINOPHIL NFR BLD AUTO: 3.1 % (ref 0.3–6.2)
ERYTHROCYTE [DISTWIDTH] IN BLOOD BY AUTOMATED COUNT: 12.3 % (ref 12.3–15.4)
GLUCOSE SERPL-MCNC: 104 MG/DL (ref 65–99)
HCT VFR BLD AUTO: 28.1 % (ref 34–46.6)
HGB BLD-MCNC: 9.5 G/DL (ref 12–15.9)
IMM GRANULOCYTES # BLD AUTO: 0.02 10*3/MM3 (ref 0–0.05)
IMM GRANULOCYTES NFR BLD AUTO: 0.3 % (ref 0–0.5)
LYMPHOCYTES # BLD AUTO: 0.73 10*3/MM3 (ref 0.7–3.1)
LYMPHOCYTES NFR BLD AUTO: 12.5 % (ref 19.6–45.3)
MCH RBC QN AUTO: 32.5 PG (ref 26.6–33)
MCHC RBC AUTO-ENTMCNC: 33.8 G/DL (ref 31.5–35.7)
MCV RBC AUTO: 96.2 FL (ref 79–97)
MONOCYTES # BLD AUTO: 0.82 10*3/MM3 (ref 0.1–0.9)
MONOCYTES NFR BLD AUTO: 14.1 % (ref 5–12)
NEUTROPHILS NFR BLD AUTO: 4.03 10*3/MM3 (ref 1.7–7)
NEUTROPHILS NFR BLD AUTO: 69.1 % (ref 42.7–76)
NRBC BLD AUTO-RTO: 0 /100 WBC (ref 0–0.2)
PHOSPHATE SERPL-MCNC: 4.3 MG/DL (ref 2.5–4.5)
PLATELET # BLD AUTO: 330 10*3/MM3 (ref 140–450)
PMV BLD AUTO: 10 FL (ref 6–12)
POTASSIUM SERPL-SCNC: 4.8 MMOL/L (ref 3.5–5.2)
RBC # BLD AUTO: 2.92 10*6/MM3 (ref 3.77–5.28)
SODIUM SERPL-SCNC: 144 MMOL/L (ref 136–145)
WBC NRBC COR # BLD: 5.83 10*3/MM3 (ref 3.4–10.8)

## 2023-02-10 PROCEDURE — 85025 COMPLETE CBC W/AUTO DIFF WBC: CPT

## 2023-02-10 PROCEDURE — 36415 COLL VENOUS BLD VENIPUNCTURE: CPT

## 2023-02-10 PROCEDURE — 80069 RENAL FUNCTION PANEL: CPT

## 2023-03-01 ENCOUNTER — HOSPITAL ENCOUNTER (OUTPATIENT)
Dept: CT IMAGING | Facility: HOSPITAL | Age: 88
Discharge: HOME | End: 2023-03-01
Payer: MEDICARE

## 2023-03-01 ENCOUNTER — OFFICE VISIT (OUTPATIENT)
Dept: VASCULAR SURGERY | Facility: HOSPITAL | Age: 88
End: 2023-03-01
Payer: MEDICARE

## 2023-03-01 VITALS
RESPIRATION RATE: 18 BRPM | DIASTOLIC BLOOD PRESSURE: 64 MMHG | SYSTOLIC BLOOD PRESSURE: 142 MMHG | HEART RATE: 90 BPM | TEMPERATURE: 96.6 F | OXYGEN SATURATION: 95 %

## 2023-03-01 DIAGNOSIS — C34.12 MALIGNANT NEOPLASM OF UPPER LOBE OF LEFT LUNG: ICD-10-CM

## 2023-03-01 DIAGNOSIS — N18.9 CHRONIC KIDNEY DISEASE, UNSPECIFIED CKD STAGE: Primary | ICD-10-CM

## 2023-03-01 DIAGNOSIS — T82.898A STEAL SYNDROME AS COMPLICATION OF DIALYSIS ACCESS, INITIAL ENCOUNTER: ICD-10-CM

## 2023-03-01 PROCEDURE — 99214 OFFICE O/P EST MOD 30 MIN: CPT | Performed by: SURGERY

## 2023-03-01 PROCEDURE — G0463 HOSPITAL OUTPT CLINIC VISIT: HCPCS | Performed by: SURGERY

## 2023-03-01 PROCEDURE — 71250 CT THORAX DX C-: CPT

## 2023-03-01 NOTE — PROGRESS NOTES
[facility]   Follow up Office    Patient Name: Charlette Rai  : 1935  MRN: [id number]  Primary Care Physician:  Clyde Lainez MD      Subjective   Subjective     HPI:    Charlette Rai is a 88 y.o. female here for follow-up to have her left arm checked.  She started having pain in the left arm and hand about 3 weeks ago.  Wakes her up at night.  She had a left basilic vein transposition 5/10/2022.      Objective     Vitals:   Temp:  [96.6 °F (35.9 °C)] 96.6 °F (35.9 °C)  Heart Rate:  [90] 90  Resp:  [18] 18  BP: (142)/(64) 142/64    Physical Exam      General: Alert, no acute distress  Wounds: Well-healed.  AV fistula: Excellent bruit and thrill.  Left hand: Slightly ruborous with very faint cyanosis.  Capillary refill 3 seconds.    Assessment & Plan   Assessment / Plan     Diagnoses and all orders for this visit:    1. Chronic kidney disease, unspecified CKD stage (Primary)  -     Case Request; Standing  -     CBC & Differential; Future  -     Basic Metabolic Panel; Future  -     ceFAZolin (ANCEF) 2 g in sodium chloride 0.9 % 100 mL IVPB  -     Case Request    2. Steal syndrome as complication of dialysis access, initial encounter ([facility])  -     Case Request; Standing  -     CBC & Differential; Future  -     Basic Metabolic Panel; Future  -     ceFAZolin (ANCEF) 2 g in sodium chloride 0.9 % 100 mL IVPB  -     Case Request    Other orders  -     Follow Anesthesia Guidelines / Protocol; Future  -     Obtain Informed Consent; Future  -     Provide NPO Instructions to Patient; Future  -     Chlorhexidine Skin Prep; Future  -     Follow Anesthesia Guidelines / Protocol; Standing  -     Verify / Perform Chlorhexidine Skin Prep; Standing  -     Verify / Perform Chlorhexidine Skin Prep if Indicated (If Not Already Completed); Standing       Assessment/Plan:   Mrs. Rai has findings consistent with steal syndrome of her left hand.  I am recommending that we revise the fistula providing it with a more proximal  inflow.  I have discussed with her in detail the mechanics of the procedure, the indications, benefits, risks, alternatives, as well as potential complications to include but not limited to infection, bleeding, reoperation, failure of the access.  She appears to understand and desires to proceed.        Electronically signed by Wan Barksdale MD, 03/01/23, 1:16 PM EST.

## 2023-03-21 NOTE — PRE-PROCEDURE INSTRUCTIONS
Patient instructed to have no food past midnight, clears up to 2 hours prior to arrival time. Patient instructed to wear no lotions, jewelry or piercing's day of surgery.  Patient to shower with surgical soap am of surgeyr.  Patient to take amlodipine, aspirin, pletal, dexilant, pepcid, fenofibrate, hydrocodone, xyzal and symbicort

## 2023-03-22 ENCOUNTER — ANESTHESIA EVENT (OUTPATIENT)
Dept: PERIOP | Facility: HOSPITAL | Age: 88
End: 2023-03-22
Payer: MEDICARE

## 2023-03-22 PROCEDURE — S0260 H&P FOR SURGERY: HCPCS | Performed by: SURGERY

## 2023-03-22 NOTE — H&P
[facility]   HISTORY AND PHYSICAL    Patient Name: Charlette Rai  : 1935  MRN: [id number]  Primary Care Physician:  Clyde Bailey MD  Date of admission: (Not on file)    Subjective   Subjective     Chief Complaint: Left hand pain    HPI:    Charlette Rai is a 88 y.o. female who had a left basilic vein transposition 5/10/2022.  She had been doing well until about a month ago when she started having pain in her left hand.  History and physical examination are consistent with steal syndrome.  At this time for revision of the fistula with proximalization.    Review of Systems    Non contributory except for the History of Present Illness    Personal History     Past Medical History:   Diagnosis Date   • Allergic rhinitis    • Anemia    • Arthritis    • Asthma     USE INHALERS AND NEBULIZERS   • Bladder disorder    • Cancer (HCC)     LEFT LUNG CANCER  SURGERY AND CHEMO DONE  AND CURRENTLY   RIGHT LUNG CANCER HAS ONLY RECEIVED RADIATION THUS FAR   • Chronic kidney disease     stage 3   • CKD (chronic kidney disease) stage 4, GFR 15-29 ml/min ([facility])    • Condition not found     ulcer   • Congestive heart failure (CHF) ([facility])     FOLLOWED BY DR AQUINO. DENIES CP BUT DOES HAVE SOA CHRONIC ISSUE COPD/LUNG CANCER   • COPD (chronic obstructive pulmonary disease) ([facility])     FOLLOWED BY DR MARIAJOSE BAILEY   • Coronary artery disease     DENIES CP BUT DOES GET SOA MOST OF THE TIME WITH EXERTION BUT OCC AT REST CHRONIC ISSUE COPD/LUNG CANCER   • Deep vein thrombosis ([facility])    • Diabetes mellitus ([facility])     DOES NOT CHECK BS DAILY   • Disease of thyroid gland     HYPOTHYROIDISM   • Essential hypertension    • Gastric ulcer    • GERD (gastroesophageal reflux disease)    • Heart murmur    • History of transfusion     NO ISSUES POST TRANSFUSION WAS MANY YEARS AGO   • Hyperlipemia    • Leukocytopenia     FOLLOWED BY DR MIR BAILEY   • Limb swelling    • Lumbago    • Lumbar spinal stenosis    • Lung cancer ([facility])    •  Migraine headache    • Multiple joint pain    • Osteopenia    • PONV (postoperative nausea and vomiting)    • Reflux esophagitis    • Shortness of breath    • Thyroid nodule     HAS ULTRASOUND YEARLY BEING MONITORED   • Vascular disease    • Vitamin D deficiency        Past Surgical History:   Procedure Laterality Date   • ABDOMINAL SURGERY     • APPENDECTOMY     • ARTERIOVENOUS FISTULA/SHUNT SURGERY Left 05/10/2022    Procedure: Left basilic vein transposition;  Surgeon: Wan Barksdale MD;  Location: [facility] MAIN OR;  Service: Vascular;  Laterality: Left;   • CARDIAC CATHETERIZATION  1996   • CARDIAC SURGERY     • CARDIAC SURGERY      fluid drained from heart   • CATARACT EXTRACTION, BILATERAL  2003   • COLONOSCOPY  2014   • ENDOSCOPY  2016 2019   • FEMORAL ARTERY STENT Bilateral    • HYSTERECTOMY     • LUNG BIOPSY Left 2005    lobectomy upper lung caner   • LUNG VOLUME REDUCTION     • OTHER SURGICAL HISTORY      artifical joints/limbs   • REPLACEMENT TOTAL KNEE Left 2016   • UPPER GASTROINTESTINAL ENDOSCOPY         Family History: family history includes Arthritis in her father and mother; Cancer in her brother, brother, brother, and brother; Diabetes in her brother; Other in her brother; Prostate cancer in her brother and brother. Otherwise pertinent FHx was reviewed and not pertinent to current issue.    Social History:  reports that she quit smoking about 18 years ago. Her smoking use included cigarettes. She started smoking about 71 years ago. She has a 30.00 pack-year smoking history. She has never used smokeless tobacco. She reports that she does not drink alcohol and does not use drugs.    Home Medications:  No current facility-administered medications on file prior to encounter.     Current Outpatient Medications on File Prior to Encounter   Medication Sig   • albuterol sulfate  (90 Base) MCG/ACT inhaler Inhale 2 puffs Every 4 (Four) Hours As Needed for Wheezing.   • amLODIPine (NORVASC) 10 MG  tablet Take 1 tablet by mouth Daily.   • aspirin 81 MG chewable tablet Chew 1 tablet Daily.   • atorvastatin (LIPITOR) 40 MG tablet Take 1 tablet by mouth Every Night.   • cilostazol (PLETAL) 100 MG tablet Take 1 tablet by mouth 2 (two) times a day. PER DR SAYRA PONCE TO CONTINUE PLETAL   • dexlansoprazole (DEXILANT) 60 MG capsule Take 1 capsule by mouth.   • Diclofenac Sodium (Voltaren) 1 % gel gel Apply 4 g topically to the appropriate area as directed 4 (Four) Times a Day As Needed (pain).   • ergocalciferol (ERGOCALCIFEROL) 1.25 MG (15197 UT) capsule Take 1 capsule by mouth 1 (One) Time Per Week.   • famotidine (PEPCID) 40 MG tablet Take 1 tablet by mouth Daily.   • fenofibrate (FENOGLIDE) 120 MG tablet Take 1 tablet by mouth Daily.   • ferrous sulfate 325 (65 FE) MG tablet Take 1 tablet by mouth 2 (two) times a day.   • furosemide (LASIX) 40 MG tablet Take 2 tablets by mouth. TAKE 2 TABLETS ON MON-WED-FRI   • glucose blood (OneTouch Verio) test strip Use test strips to test blood sugar three times daily for diagnosis E11.9 Diabetes   • HYDROcodone-acetaminophen (NORCO) 5-325 MG per tablet Take 1 tablet by mouth Every 6 (Six) Hours As Needed for Severe Pain .   • ipratropium-albuterol (DUO-NEB) 0.5-2.5 mg/3 ml nebulizer INHALE 3ML (1 VIAL) VIA NEBULIZER EVERY 12 HOURS   • Lancets (onetouch ultrasoft) lancets Use lancets to test blood sugar 3 times daily   • levocetirizine (XYZAL) 5 MG tablet TAKE 1 TABLET DAILY   • linagliptin (Tradjenta) 5 MG tablet tablet Take 1 tablet by mouth Daily.   • methylcellulose, Laxative, (CITRUCEL) 500 MG tablet tablet Take 1,250 mg by mouth.   • nitroglycerin (NITROSTAT) 0.4 MG SL tablet Nitrostat 0.4 mg sublingual tablet, sublingual place 1 tablet (0.4 mg) by buccal route at the first sign of an attack; no more than 3 tabs are recommended within a 15 minute period.   Active   • OneTouch Ultra test strip 1 each by Other route As Needed (blood sugar check). 4 times a day   • Symbicort  160-4.5 MCG/ACT inhaler Inhale 2 puffs 2 (two) times a day.   • traMADol (ULTRAM) 50 MG tablet Take 1 tablet by mouth Daily As Needed (once daily as needed).   • Trulicity 1.5 MG/0.5ML solution pen-injector INJECT 0.5ML (=1.5 MG)     SUBCUTANEOUSLY INTO THE    APPROPRIATE AREA AS        DIRECTED EVERY 7 DAYS          Allergies:  No Known Allergies    Objective   Objective     Vitals:        Physical Exam   General: Alert, no acute distress.  Neck: Supple  Heart: Regular rate  Lungs: Clear  Abdomen: Benign  Extremities: Symmetric  Left arm fistula: Excellent bruit and thrill.  Pulses: Diminished left radial pulse.    Assessment & Plan   Assessment / Plan     Active Hospital Problems:  Active Hospital Problems    Diagnosis    • Steal syndrome of dialysis vascular access (HCC)    • Stage 4 chronic kidney disease (HCC)        There are no diagnoses linked to this encounter.    Assessment/plan:   Mrs. Rai has findings consistent with steal syndrome of her left hand.  I am recommending that we revise the fistula providing it with a more proximal inflow.  I have discussed with her in detail the mechanics of the procedure, the indications, benefits, risks, alternatives, as well as potential complications to include but not limited to infection, bleeding, reoperation, failure of the access.  She appears to understand and desires to proceed.         Electronically signed by Wan Barksdale MD, 03/22/23, 2:25 PM EDT.

## 2023-03-23 ENCOUNTER — HOSPITAL ENCOUNTER (OUTPATIENT)
Facility: HOSPITAL | Age: 88
Setting detail: HOSPITAL OUTPATIENT SURGERY
Discharge: HOME | End: 2023-03-23
Attending: SURGERY | Admitting: SURGERY
Payer: MEDICARE

## 2023-03-23 ENCOUNTER — ANESTHESIA (OUTPATIENT)
Dept: PERIOP | Facility: HOSPITAL | Age: 88
End: 2023-03-23
Payer: MEDICARE

## 2023-03-23 VITALS
RESPIRATION RATE: 18 BRPM | HEIGHT: 64 IN | SYSTOLIC BLOOD PRESSURE: 171 MMHG | OXYGEN SATURATION: 96 % | HEART RATE: 99 BPM | WEIGHT: 145.5 LBS | TEMPERATURE: 97.4 F | BODY MASS INDEX: 24.84 KG/M2 | DIASTOLIC BLOOD PRESSURE: 64 MMHG

## 2023-03-23 DIAGNOSIS — N18.9 CHRONIC KIDNEY DISEASE, UNSPECIFIED CKD STAGE: ICD-10-CM

## 2023-03-23 DIAGNOSIS — T82.898A STEAL SYNDROME AS COMPLICATION OF DIALYSIS ACCESS, INITIAL ENCOUNTER: ICD-10-CM

## 2023-03-23 LAB
ANION GAP SERPL CALCULATED.3IONS-SCNC: 11.5 MMOL/L (ref 5–15)
BASOPHILS # BLD AUTO: 0.08 10*3/MM3 (ref 0–0.2)
BASOPHILS NFR BLD AUTO: 1 % (ref 0–1.5)
BUN SERPL-MCNC: 54 MG/DL (ref 8–23)
BUN/CREAT SERPL: 23 (ref 7–25)
CALCIUM SPEC-SCNC: 9.2 MG/DL (ref 8.6–10.5)
CHLORIDE SERPL-SCNC: 109 MMOL/L (ref 98–107)
CO2 SERPL-SCNC: 22.5 MMOL/L (ref 22–29)
CREAT SERPL-MCNC: 2.35 MG/DL (ref 0.57–1)
DEPRECATED RDW RBC AUTO: 46.5 FL (ref 37–54)
EGFRCR SERPLBLD CKD-EPI 2021: 19.7 ML/MIN/1.73
EOSINOPHIL # BLD AUTO: 0.21 10*3/MM3 (ref 0–0.4)
EOSINOPHIL NFR BLD AUTO: 2.5 % (ref 0.3–6.2)
ERYTHROCYTE [DISTWIDTH] IN BLOOD BY AUTOMATED COUNT: 13 % (ref 12.3–15.4)
GLUCOSE BLDC GLUCOMTR-MCNC: 105 MG/DL (ref 70–99)
GLUCOSE SERPL-MCNC: 103 MG/DL (ref 65–99)
HCT VFR BLD AUTO: 29.7 % (ref 34–46.6)
HGB BLD-MCNC: 9.5 G/DL (ref 12–15.9)
IMM GRANULOCYTES # BLD AUTO: 0.02 10*3/MM3 (ref 0–0.05)
IMM GRANULOCYTES NFR BLD AUTO: 0.2 % (ref 0–0.5)
LYMPHOCYTES # BLD AUTO: 1.18 10*3/MM3 (ref 0.7–3.1)
LYMPHOCYTES NFR BLD AUTO: 14.3 % (ref 19.6–45.3)
MCH RBC QN AUTO: 31.8 PG (ref 26.6–33)
MCHC RBC AUTO-ENTMCNC: 32 G/DL (ref 31.5–35.7)
MCV RBC AUTO: 99.3 FL (ref 79–97)
MONOCYTES # BLD AUTO: 1.33 10*3/MM3 (ref 0.1–0.9)
MONOCYTES NFR BLD AUTO: 16.1 % (ref 5–12)
NEUTROPHILS NFR BLD AUTO: 5.45 10*3/MM3 (ref 1.7–7)
NEUTROPHILS NFR BLD AUTO: 65.9 % (ref 42.7–76)
NRBC BLD AUTO-RTO: 0 /100 WBC (ref 0–0.2)
PLATELET # BLD AUTO: 224 10*3/MM3 (ref 140–450)
PMV BLD AUTO: 9.4 FL (ref 6–12)
POTASSIUM SERPL-SCNC: 4.7 MMOL/L (ref 3.5–5.2)
RBC # BLD AUTO: 2.99 10*6/MM3 (ref 3.77–5.28)
SODIUM SERPL-SCNC: 143 MMOL/L (ref 136–145)
WBC NRBC COR # BLD: 8.27 10*3/MM3 (ref 3.4–10.8)

## 2023-03-23 PROCEDURE — 25010000002 MIDAZOLAM PER 1MG: Performed by: ANESTHESIOLOGY

## 2023-03-23 PROCEDURE — 37607 LIG/BANDING ANGIOACS AV FSTL: CPT | Performed by: SURGERY

## 2023-03-23 PROCEDURE — 25010000002 DEXAMETHASONE PER 1 MG: Performed by: NURSE ANESTHETIST, CERTIFIED REGISTERED

## 2023-03-23 PROCEDURE — 82962 GLUCOSE BLOOD TEST: CPT

## 2023-03-23 PROCEDURE — 93010 ELECTROCARDIOGRAM REPORT: CPT | Performed by: INTERNAL MEDICINE

## 2023-03-23 PROCEDURE — 25010000002 ONDANSETRON PER 1 MG: Performed by: NURSE ANESTHETIST, CERTIFIED REGISTERED

## 2023-03-23 PROCEDURE — 93005 ELECTROCARDIOGRAM TRACING: CPT | Performed by: ANESTHESIOLOGY

## 2023-03-23 PROCEDURE — 0 LIDOCAINE 1 % SOLUTION 10 ML VIAL: Performed by: SURGERY

## 2023-03-23 PROCEDURE — 25010000002 PROPOFOL 10 MG/ML EMULSION: Performed by: NURSE ANESTHETIST, CERTIFIED REGISTERED

## 2023-03-23 PROCEDURE — 85025 COMPLETE CBC W/AUTO DIFF WBC: CPT | Performed by: SURGERY

## 2023-03-23 PROCEDURE — 80048 BASIC METABOLIC PNL TOTAL CA: CPT | Performed by: SURGERY

## 2023-03-23 PROCEDURE — 25010000002 FENTANYL CITRATE (PF) 50 MCG/ML SOLUTION: Performed by: NURSE ANESTHETIST, CERTIFIED REGISTERED

## 2023-03-23 PROCEDURE — 25010000002 CEFAZOLIN IN DEXTROSE 2-4 GM/100ML-% SOLUTION: Performed by: SURGERY

## 2023-03-23 RX ORDER — PHENYLEPHRINE HCL IN 0.9% NACL 1 MG/10 ML
SYRINGE (ML) INTRAVENOUS AS NEEDED
Status: DISCONTINUED | OUTPATIENT
Start: 2023-03-23 | End: 2023-03-23 | Stop reason: SURG

## 2023-03-23 RX ORDER — PROPOFOL 10 MG/ML
VIAL (ML) INTRAVENOUS AS NEEDED
Status: DISCONTINUED | OUTPATIENT
Start: 2023-03-23 | End: 2023-03-23 | Stop reason: SURG

## 2023-03-23 RX ORDER — FENTANYL CITRATE 50 UG/ML
INJECTION, SOLUTION INTRAMUSCULAR; INTRAVENOUS AS NEEDED
Status: DISCONTINUED | OUTPATIENT
Start: 2023-03-23 | End: 2023-03-23 | Stop reason: SURG

## 2023-03-23 RX ORDER — DEXAMETHASONE SODIUM PHOSPHATE 4 MG/ML
INJECTION, SOLUTION INTRA-ARTICULAR; INTRALESIONAL; INTRAMUSCULAR; INTRAVENOUS; SOFT TISSUE AS NEEDED
Status: DISCONTINUED | OUTPATIENT
Start: 2023-03-23 | End: 2023-03-23 | Stop reason: SURG

## 2023-03-23 RX ORDER — ACETAMINOPHEN 500 MG
1000 TABLET ORAL ONCE
Status: COMPLETED | OUTPATIENT
Start: 2023-03-23 | End: 2023-03-23

## 2023-03-23 RX ORDER — PROMETHAZINE HYDROCHLORIDE 12.5 MG/1
25 TABLET ORAL ONCE AS NEEDED
Status: DISCONTINUED | OUTPATIENT
Start: 2023-03-23 | End: 2023-03-23 | Stop reason: HOSPADM

## 2023-03-23 RX ORDER — OXYCODONE HYDROCHLORIDE 5 MG/1
5 TABLET ORAL
Status: DISCONTINUED | OUTPATIENT
Start: 2023-03-23 | End: 2023-03-23 | Stop reason: HOSPADM

## 2023-03-23 RX ORDER — ONDANSETRON 2 MG/ML
INJECTION INTRAMUSCULAR; INTRAVENOUS AS NEEDED
Status: DISCONTINUED | OUTPATIENT
Start: 2023-03-23 | End: 2023-03-23 | Stop reason: SURG

## 2023-03-23 RX ORDER — GLYCOPYRROLATE 0.2 MG/ML
0.2 INJECTION INTRAMUSCULAR; INTRAVENOUS
Status: COMPLETED | OUTPATIENT
Start: 2023-03-23 | End: 2023-03-23

## 2023-03-23 RX ORDER — SODIUM CHLORIDE, SODIUM LACTATE, POTASSIUM CHLORIDE, CALCIUM CHLORIDE 600; 310; 30; 20 MG/100ML; MG/100ML; MG/100ML; MG/100ML
INJECTION, SOLUTION INTRAVENOUS CONTINUOUS PRN
Status: DISCONTINUED | OUTPATIENT
Start: 2023-03-23 | End: 2023-03-23 | Stop reason: SURG

## 2023-03-23 RX ORDER — CEFAZOLIN SODIUM 2 G/100ML
2 INJECTION, SOLUTION INTRAVENOUS ONCE
Status: COMPLETED | OUTPATIENT
Start: 2023-03-23 | End: 2023-03-23

## 2023-03-23 RX ORDER — PROMETHAZINE HYDROCHLORIDE 25 MG/1
25 SUPPOSITORY RECTAL ONCE AS NEEDED
Status: DISCONTINUED | OUTPATIENT
Start: 2023-03-23 | End: 2023-03-23 | Stop reason: HOSPADM

## 2023-03-23 RX ORDER — LIDOCAINE HYDROCHLORIDE 20 MG/ML
INJECTION, SOLUTION EPIDURAL; INFILTRATION; INTRACAUDAL; PERINEURAL AS NEEDED
Status: DISCONTINUED | OUTPATIENT
Start: 2023-03-23 | End: 2023-03-23 | Stop reason: SURG

## 2023-03-23 RX ORDER — ONDANSETRON 2 MG/ML
4 INJECTION INTRAMUSCULAR; INTRAVENOUS ONCE AS NEEDED
Status: DISCONTINUED | OUTPATIENT
Start: 2023-03-23 | End: 2023-03-23 | Stop reason: HOSPADM

## 2023-03-23 RX ORDER — HYDROCODONE BITARTRATE AND ACETAMINOPHEN 5; 325 MG/1; MG/1
1 TABLET ORAL EVERY 6 HOURS PRN
Qty: 20 TABLET | Refills: 0 | Status: SHIPPED | OUTPATIENT
Start: 2023-03-23

## 2023-03-23 RX ORDER — MIDAZOLAM HYDROCHLORIDE 2 MG/2ML
1 INJECTION, SOLUTION INTRAMUSCULAR; INTRAVENOUS ONCE
Status: COMPLETED | OUTPATIENT
Start: 2023-03-23 | End: 2023-03-23

## 2023-03-23 RX ORDER — SODIUM CHLORIDE 9 MG/ML
9 INJECTION, SOLUTION INTRAVENOUS CONTINUOUS PRN
Status: DISCONTINUED | OUTPATIENT
Start: 2023-03-23 | End: 2023-03-23 | Stop reason: HOSPADM

## 2023-03-23 RX ADMIN — CEFAZOLIN SODIUM 2 G: 2 INJECTION, SOLUTION INTRAVENOUS at 11:03

## 2023-03-23 RX ADMIN — FENTANYL CITRATE 25 MCG: 50 INJECTION, SOLUTION INTRAMUSCULAR; INTRAVENOUS at 11:08

## 2023-03-23 RX ADMIN — PROPOFOL 50 MG: 10 INJECTION, EMULSION INTRAVENOUS at 10:56

## 2023-03-23 RX ADMIN — PROPOFOL 100 MG: 10 INJECTION, EMULSION INTRAVENOUS at 10:55

## 2023-03-23 RX ADMIN — SODIUM CHLORIDE 9 ML/HR: 9 INJECTION, SOLUTION INTRAVENOUS at 10:03

## 2023-03-23 RX ADMIN — Medication 200 MCG: at 11:23

## 2023-03-23 RX ADMIN — LIDOCAINE HYDROCHLORIDE 60 MG: 20 INJECTION, SOLUTION EPIDURAL; INFILTRATION; INTRACAUDAL; PERINEURAL at 10:55

## 2023-03-23 RX ADMIN — MIDAZOLAM HYDROCHLORIDE 1 MG: 1 INJECTION, SOLUTION INTRAMUSCULAR; INTRAVENOUS at 10:03

## 2023-03-23 RX ADMIN — GLYCOPYRROLATE 0.2 MG: 0.2 INJECTION INTRAMUSCULAR; INTRAVENOUS at 10:03

## 2023-03-23 RX ADMIN — DEXAMETHASONE SODIUM PHOSPHATE 4 MG: 4 INJECTION, SOLUTION INTRA-ARTICULAR; INTRALESIONAL; INTRAMUSCULAR; INTRAVENOUS; SOFT TISSUE at 11:00

## 2023-03-23 RX ADMIN — Medication 200 MCG: at 11:13

## 2023-03-23 RX ADMIN — FENTANYL CITRATE 25 MCG: 50 INJECTION, SOLUTION INTRAMUSCULAR; INTRAVENOUS at 10:59

## 2023-03-23 RX ADMIN — ACETAMINOPHEN 1000 MG: 500 TABLET ORAL at 10:03

## 2023-03-23 RX ADMIN — SODIUM CHLORIDE, POTASSIUM CHLORIDE, SODIUM LACTATE AND CALCIUM CHLORIDE: 600; 310; 30; 20 INJECTION, SOLUTION INTRAVENOUS at 10:46

## 2023-03-23 RX ADMIN — ONDANSETRON 4 MG: 2 INJECTION INTRAMUSCULAR; INTRAVENOUS at 11:18

## 2023-03-23 NOTE — OP NOTE
ARTERIOVENOUS FISTULA REPAIR  Procedure Report    Patient Name:  Charlette Rai  YOB: 1935    Date of Surgery:  3/23/2023     Indications: Steal syndrome of the left hand associated with left arm arteriovenous fistula    Pre-op Diagnosis:   Chronic kidney disease, unspecified CKD stage [N18.9]  Steal syndrome as complication of dialysis access, initial encounter ([facility]) [T82.898A]       Post-Op Diagnosis Codes:     * Chronic kidney disease, unspecified CKD stage [N18.9]     * Steal syndrome as complication of dialysis access, initial encounter ([facility]) [T82.898A]    Procedure(s):  Ligation of left arm arteriovenous fistula    Staff:  Surgeon(s):  Wan Barksdale MD    Assistant: Ninoska Weaver RN CSA    Anesthesia: General    Estimated Blood Loss: 10 mL    Implants:    Nothing was implanted during the procedure    Specimen: None       Findings: Satisfactory occlusion of the left arm fistula with no Doppler signal following ligation.  Significant improvement in the brachial artery Doppler signal distal to the fistula following ligation from medium amplitude continuous flow to a biphasic signal of moderate to high amplitude.    Complications: None    Description of Procedure: The patient was brought into the operating room and was placed in the supine position.  She was then induced into general anesthesia via LMA.       She was positioned with the left arm extended on an armboard.  She was then prepped and draped in the usual aseptic fashion.  A stockinette was applied to the hand and forearm and was secured in place using Coban.  A timeout was taken to confirm patient, procedure and laterality.  Local anesthesia was then administered at the projected site of incision.  An incision was then made in a longitudinal fashion over the distal left upper arm approximately 1 fingerbreadth above the antecubital crease.  The subcutaneous tissue was traversed using electrocautery.  Blunt and sharp dissection were then  used to dissect the arteriovenous fistula.  Doppler signals were obtained of the fistula to ensure that this was indeed the fistula and of the brachial artery before and after ligation demonstrating a major improvement in the quality of the Doppler signal.  The proximal fistula was then ligated using 2 separate 2-0 silk ties.  Complete elimination of the thrill was identified.  Complete elimination of the Doppler signal was also identified.  The wound was inspected for hemostasis and hemostasis assured.  The wound was then approximated using 3-0 Vicryl in a running fashion for approximation of the dermis and subcutaneous tissue.  Dermabond was then applied to the wound.  The patient tolerated the procedure well.         Assistant: Ninoska Weaver RN CSA  was responsible for performing the following activities: First assist and their skilled assistance was necessary for the success of this case.    Wan Barksdale MD     Date: 3/23/2023  Time: 11:35 EDT

## 2023-03-23 NOTE — ANESTHESIA PREPROCEDURE EVALUATION
Anesthesia Evaluation     Patient summary reviewed and Nursing notes reviewed   no history of anesthetic complications:  NPO Solid Status: > 8 hours  NPO Liquid Status: < 2 hours           Airway   Mallampati: II  TM distance: >3 FB  Neck ROM: full  No difficulty expected  Dental - normal exam     Pulmonary - normal exam    breath sounds clear to auscultation  (+) lung cancer (2005 s/p chemo xrt Left upper lobe), COPD moderate, asthma,home oxygen (3lpm NC), shortness of breath,   Cardiovascular   Exercise tolerance: poor (<4 METS)    ECG reviewed  Rhythm: regular  Rate: abnormal    (+) hypertension, valvular problems/murmurs murmur, CAD, CHF , murmur, PVD, DVT resolved, hyperlipidemia,  carotid artery disease carotid bilateral    ROS comment: 2019 echo EF 60% mild fibrocalcific MV and AV    Neuro/Psych  (+) headaches, numbness,    GI/Hepatic/Renal/Endo    (+)  GERD well controlled, PUD,  renal disease CRI and ESRD, diabetes mellitus type 2, thyroid problem hypothyroidism    Musculoskeletal     (+) back pain,   Abdominal    Substance History - negative use     OB/GYN negative ob/gyn ROS         Other   arthritis,    history of cancer remission    ROS/Med Hx Other: 6 oz sprite 630am  Not on dialysis yet has chronic renal failure K 4.7                  Anesthesia Plan    ASA 4     MAC and general     (Patient understands anesthesia not responsible for dental damage.)  intravenous induction     Anesthetic plan, risks, benefits, and alternatives have been provided, discussed and informed consent has been obtained with: patient.  Pre-procedure education provided  Use of blood products discussed with patient  Consented to blood products.   Plan discussed with CRNA.        CODE STATUS:

## 2023-03-23 NOTE — INTERVAL H&P NOTE
H&P updated. The patient was examined and the following changes are noted:  have discussed risks and benefits with the patient and decided instead to ligate the left arm fistula which provides the best chance of improving her hand.  I have discussed with her in detail the mechanics of the procedure, the indications, benefits, risks, alternatives, as well as potential complications to include but not limited to infection, bleeding, reoperation, failure of the fistula to develop.  She appears to understand and desires to proceed.

## 2023-03-23 NOTE — ANESTHESIA POSTPROCEDURE EVALUATION
Patient: Charlette Rai    Procedure Summary     Date: 03/23/23 Room / Location: [facility] OR 01 / [facility] MAIN OR    Anesthesia Start: 1046 Anesthesia Stop: 1139    Procedure: ARTERIOVENOUS FISTULA REVISION LEFT ARM (Left) Diagnosis:       Chronic kidney disease, unspecified CKD stage      Steal syndrome as complication of dialysis access, initial encounter ([facility])      (Chronic kidney disease, unspecified CKD stage [N18.9])      (Steal syndrome as complication of dialysis access, initial encounter ([facility]) [T82.898A])    Surgeons: Wan Barksdale MD Provider: Alfonso Lima MD    Anesthesia Type: MAC, general ASA Status: 4          Anesthesia Type: MAC, general    Vitals  Vitals Value Taken Time   /54 03/23/23 1215   Temp 36.6 °C (97.9 °F) 03/23/23 1215   Pulse 99 03/23/23 1216   Resp 18 03/23/23 1215   SpO2 96 % 03/23/23 1216   Vitals shown include unvalidated device data.        Post Anesthesia Care and Evaluation    Patient location during evaluation: bedside  Patient participation: complete - patient participated  Level of consciousness: awake  Pain management: adequate    Airway patency: patent  PONV Status: none  Cardiovascular status: acceptable and stable  Respiratory status: acceptable  Hydration status: acceptable    Comments: An Anesthesiologist personally participated in the most demanding procedures (including induction and emergence if applicable) in the anesthesia plan, monitored the course of anesthesia administration at frequent intervals and remained physically present and available for immediate diagnosis and treatment of emergencies.

## 2023-03-23 NOTE — DISCHARGE INSTRUCTIONS
May wash area in 2 days.  Follow-up in the office in 2 weeks, call for appointment.  Resume home diet.  Resume home medications.  No lifting greater than 15 pounds for 2 weeks.     DISCHARGE INSTRUCTIONS  SURGICAL / AMBULATORY  PROCEDURES      For your surgery you had:  General anesthesia (you may have a sore throat for the first 24 hours)  IV sedation.  Local anesthesia  Monitored anesthesia Care  You received a medicated patch for nausea prevention today (behind your ear). It is recommended that you remove it 24-48 hours post-operatively. It must be removed within 72 hours.   You have received an anesthesia medication today that can cause hormonal forms of birth control to be ineffective. You should use a different form of birth control (to prevent pregnancy) for 7 days.   You may experience dizziness, drowsiness, or light-headedness for several hours following surgery/procedure.  Do not stay alone today or tonight.  Limit your activity for 24 hours.  Resume your diet slowly.  Follow whatever special dietary instructions you may have been given by your doctor.  You should not drive or operate machinery, drink alcohol, or sign legally binding documents for 24 hours or while you are taking pain medication.  Last dose of pain medication was given at:   .  NOTIFY YOUR DOCTOR IF YOU EXPERIENCE ANY OF THE FOLLOWING:  Temperature greater than 101 degrees Fahrenheit  Shaking Chills  Redness or excessive drainage from incision  Nausea, vomiting and/or pain that is not controlled by prescribed medications  Increase in bleeding or bleeding that is excessive  Unable to urinate in 6 hours after surgery  If unable to reach your doctor, please go to the closest Emergency Room  You may begin dressing changes:     [] in 24 hours   [] in 48 hours   [] Other:    You may remove Band-Aid/dressing tomorrow.  You may shower or bathe   .  Apply an ice pack 24-48 hours.  Medications per physician instructions as indicated on Discharge  Medication Information Sheet.  You should see   for follow-up care   on   .  Phone number:       SPECIAL INSTRUCTIONS:

## 2023-03-26 LAB — QT INTERVAL: 349 MS

## 2023-04-24 ENCOUNTER — OFFICE VISIT (OUTPATIENT)
Dept: VASCULAR SURGERY | Facility: HOSPITAL | Age: 88
End: 2023-04-24
Payer: MEDICARE

## 2023-04-24 VITALS
TEMPERATURE: 97.3 F | OXYGEN SATURATION: 95 % | HEART RATE: 98 BPM | DIASTOLIC BLOOD PRESSURE: 60 MMHG | RESPIRATION RATE: 16 BRPM | SYSTOLIC BLOOD PRESSURE: 132 MMHG

## 2023-04-24 DIAGNOSIS — N18.9 CHRONIC KIDNEY DISEASE, UNSPECIFIED CKD STAGE: Primary | ICD-10-CM

## 2023-04-24 PROCEDURE — 99024 POSTOP FOLLOW-UP VISIT: CPT | Performed by: SURGERY

## 2023-04-24 PROCEDURE — 1159F MED LIST DOCD IN RCRD: CPT | Performed by: SURGERY

## 2023-04-24 PROCEDURE — 1160F RVW MEDS BY RX/DR IN RCRD: CPT | Performed by: SURGERY

## 2023-04-24 PROCEDURE — G0463 HOSPITAL OUTPT CLINIC VISIT: HCPCS | Performed by: SURGERY

## 2023-04-24 NOTE — PROGRESS NOTES
[facility]   Follow up Office    Patient Name: Charlette Rai  : 1935  MRN: [id number]  Primary Care Physician:  Clyde Lainez MD      Subjective   Subjective     HPI:    Charlette Rai is a 88 y.o. female here for follow-up after ligation of left arm fistula on 3/23/2023.  Doing well.  Her left hand feels good, no pain, normal strength.  No other complaints at this time.      Objective     Vitals:   Temp:  [97.3 °F (36.3 °C)] 97.3 °F (36.3 °C)  Heart Rate:  [98] 98  Resp:  [16] 16  BP: (132)/(60) 132/60    Physical Exam      General: Alert, no acute distress  Wound: Healing well, no signs of infection.  Left hand: Grossly normal color, motor and sensory function.  Normal strength.  Pulses: +2 left radial.    Assessment & Plan   Assessment / Plan     Diagnoses and all orders for this visit:    1. Chronic kidney disease, unspecified CKD stage (Primary)       Assessment/Plan:   Satisfactory progress following ligation of left arm fistula for steal syndrome.  Follow-up as needed.        Electronically signed by Wan Barksdale MD, 23, 1:27 PM EDT.

## 2023-05-15 NOTE — PATIENT INSTRUCTIONS
Patient called stating that she recently had a period and passed some clots during this time  Patient thought initially that it was just her normal period, since it came at the time that her period was due to come  Patient had recent miscarriage, this was her second period since the miscarriage  Patient concerned that because of the clotting, that she might have had another miscarriage  Informed patient that this could just be her body getting back to normal after the miscarriage in March  Explained to patient that if her cycles continue like this, with passing clots, to call the office for an appointment  X-rays taken and reviewed.  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  She also exhibits symptoms of de Quervain's tenosynovitis.  She will begin using Voltaren gel and taking Tylenol orally.  Home exercises provided.  The membrane brace provided for activity.  Follow-up in 3 to 4 weeks for recheck, if no improvement may consider CMC versus de Quervain's tenosynovitis with Dr. Jimenez.

## 2023-07-26 ENCOUNTER — TRANSCRIBE ORDERS (OUTPATIENT)
Dept: LAB | Facility: HOSPITAL | Age: 88
End: 2023-07-26
Payer: MEDICARE

## 2023-07-26 ENCOUNTER — LAB (OUTPATIENT)
Dept: LAB | Facility: HOSPITAL | Age: 88
End: 2023-07-26
Payer: MEDICARE

## 2023-07-26 DIAGNOSIS — E11.9 DIABETES MELLITUS WITHOUT COMPLICATION: ICD-10-CM

## 2023-07-26 DIAGNOSIS — E78.5 HYPERLIPIDEMIA, UNSPECIFIED HYPERLIPIDEMIA TYPE: ICD-10-CM

## 2023-07-26 DIAGNOSIS — N18.4 CHRONIC KIDNEY DISEASE, STAGE IV (SEVERE): ICD-10-CM

## 2023-07-26 DIAGNOSIS — C34.12 MALIGNANT NEOPLASM OF UPPER LOBE BRONCHUS, LEFT: ICD-10-CM

## 2023-07-26 DIAGNOSIS — E11.9 DIABETES MELLITUS WITHOUT COMPLICATION: Primary | ICD-10-CM

## 2023-07-26 DIAGNOSIS — E55.9 VITAMIN D DEFICIENCY: Primary | ICD-10-CM

## 2023-07-26 DIAGNOSIS — E55.9 VITAMIN D DEFICIENCY: ICD-10-CM

## 2023-07-26 LAB
25(OH)D3 SERPL-MCNC: 34.7 NG/ML (ref 30–100)
ALBUMIN SERPL-MCNC: 3.8 G/DL (ref 3.5–5.2)
ALBUMIN/GLOB SERPL: 1.4 G/DL
ALP SERPL-CCNC: 183 U/L (ref 39–117)
ALT SERPL W P-5'-P-CCNC: 14 U/L (ref 1–33)
ANION GAP SERPL CALCULATED.3IONS-SCNC: 13.4 MMOL/L (ref 5–15)
AST SERPL-CCNC: 20 U/L (ref 1–32)
BACTERIA UR QL AUTO: ABNORMAL /HPF
BASOPHILS # BLD AUTO: 0.06 10*3/MM3 (ref 0–0.2)
BASOPHILS NFR BLD AUTO: 0.7 % (ref 0–1.5)
BILIRUB SERPL-MCNC: <0.2 MG/DL (ref 0–1.2)
BILIRUB UR QL STRIP: NEGATIVE
BUN SERPL-MCNC: 60 MG/DL (ref 8–23)
BUN/CREAT SERPL: 23 (ref 7–25)
CALCIUM SPEC-SCNC: 9 MG/DL (ref 8.6–10.5)
CHLORIDE SERPL-SCNC: 108 MMOL/L (ref 98–107)
CHOLEST SERPL-MCNC: 112 MG/DL (ref 0–200)
CLARITY UR: ABNORMAL
CO2 SERPL-SCNC: 19.6 MMOL/L (ref 22–29)
COLOR UR: YELLOW
CREAT SERPL-MCNC: 2.61 MG/DL (ref 0.57–1)
CREAT UR-MCNC: 78 MG/DL
DEPRECATED RDW RBC AUTO: 42.6 FL (ref 37–54)
EGFRCR SERPLBLD CKD-EPI 2021: 17.4 ML/MIN/1.73
EOSINOPHIL # BLD AUTO: 0.21 10*3/MM3 (ref 0–0.4)
EOSINOPHIL NFR BLD AUTO: 2.5 % (ref 0.3–6.2)
ERYTHROCYTE [DISTWIDTH] IN BLOOD BY AUTOMATED COUNT: 12.3 % (ref 12.3–15.4)
GLOBULIN UR ELPH-MCNC: 2.7 GM/DL
GLUCOSE SERPL-MCNC: 146 MG/DL (ref 65–99)
GLUCOSE UR STRIP-MCNC: NEGATIVE MG/DL
HBA1C MFR BLD: 6.7 % (ref 4.8–5.6)
HCT VFR BLD AUTO: 29.9 % (ref 34–46.6)
HDLC SERPL-MCNC: 60 MG/DL (ref 40–60)
HGB BLD-MCNC: 9.6 G/DL (ref 12–15.9)
HGB UR QL STRIP.AUTO: NEGATIVE
HYALINE CASTS UR QL AUTO: ABNORMAL /LPF
IMM GRANULOCYTES # BLD AUTO: 0.02 10*3/MM3 (ref 0–0.05)
IMM GRANULOCYTES NFR BLD AUTO: 0.2 % (ref 0–0.5)
KETONES UR QL STRIP: NEGATIVE
LDLC SERPL CALC-MCNC: 36 MG/DL (ref 0–100)
LDLC/HDLC SERPL: 0.59 {RATIO}
LEUKOCYTE ESTERASE UR QL STRIP.AUTO: ABNORMAL
LYMPHOCYTES # BLD AUTO: 1.07 10*3/MM3 (ref 0.7–3.1)
LYMPHOCYTES NFR BLD AUTO: 12.5 % (ref 19.6–45.3)
MCH RBC QN AUTO: 30.8 PG (ref 26.6–33)
MCHC RBC AUTO-ENTMCNC: 32.1 G/DL (ref 31.5–35.7)
MCV RBC AUTO: 95.8 FL (ref 79–97)
MONOCYTES # BLD AUTO: 1.3 10*3/MM3 (ref 0.1–0.9)
MONOCYTES NFR BLD AUTO: 15.2 % (ref 5–12)
NEUTROPHILS NFR BLD AUTO: 5.88 10*3/MM3 (ref 1.7–7)
NEUTROPHILS NFR BLD AUTO: 68.9 % (ref 42.7–76)
NITRITE UR QL STRIP: NEGATIVE
NRBC BLD AUTO-RTO: 0 /100 WBC (ref 0–0.2)
PH UR STRIP.AUTO: 5.5 [PH] (ref 5–8)
PHOSPHATE SERPL-MCNC: 4.9 MG/DL (ref 2.5–4.5)
PLATELET # BLD AUTO: 220 10*3/MM3 (ref 140–450)
PMV BLD AUTO: 10.5 FL (ref 6–12)
POTASSIUM SERPL-SCNC: 5.3 MMOL/L (ref 3.5–5.2)
PROT ?TM UR-MCNC: 19 MG/DL
PROT SERPL-MCNC: 6.5 G/DL (ref 6–8.5)
PROT UR QL STRIP: ABNORMAL
PROT/CREAT UR: 0.24 MG/G{CREAT}
PTH-INTACT SERPL-MCNC: 94.8 PG/ML (ref 15–65)
RBC # BLD AUTO: 3.12 10*6/MM3 (ref 3.77–5.28)
RBC # UR STRIP: ABNORMAL /HPF
REF LAB TEST METHOD: ABNORMAL
SODIUM SERPL-SCNC: 141 MMOL/L (ref 136–145)
SP GR UR STRIP: 1.01 (ref 1–1.03)
SQUAMOUS #/AREA URNS HPF: ABNORMAL /HPF
TRIGL SERPL-MCNC: 83 MG/DL (ref 0–150)
UROBILINOGEN UR QL STRIP: ABNORMAL
VLDLC SERPL-MCNC: 16 MG/DL (ref 5–40)
WBC # UR STRIP: ABNORMAL /HPF
WBC NRBC COR # BLD: 8.54 10*3/MM3 (ref 3.4–10.8)
YEAST URNS QL MICRO: ABNORMAL /HPF

## 2023-07-26 PROCEDURE — 81001 URINALYSIS AUTO W/SCOPE: CPT

## 2023-07-26 PROCEDURE — 80053 COMPREHEN METABOLIC PANEL: CPT

## 2023-07-26 PROCEDURE — 82306 VITAMIN D 25 HYDROXY: CPT

## 2023-07-26 PROCEDURE — 83970 ASSAY OF PARATHORMONE: CPT

## 2023-07-26 PROCEDURE — 85025 COMPLETE CBC W/AUTO DIFF WBC: CPT

## 2023-07-26 PROCEDURE — 83036 HEMOGLOBIN GLYCOSYLATED A1C: CPT

## 2023-07-26 PROCEDURE — 80061 LIPID PANEL: CPT

## 2023-07-26 PROCEDURE — 36415 COLL VENOUS BLD VENIPUNCTURE: CPT

## 2023-07-26 PROCEDURE — 84156 ASSAY OF PROTEIN URINE: CPT

## 2023-07-26 PROCEDURE — 84100 ASSAY OF PHOSPHORUS: CPT

## 2023-07-26 PROCEDURE — 82570 ASSAY OF URINE CREATININE: CPT

## 2023-09-14 RX ORDER — ATORVASTATIN CALCIUM 40 MG/1
40 TABLET, FILM COATED ORAL NIGHTLY
Qty: 90 TABLET | Refills: 3 | OUTPATIENT
Start: 2023-09-14

## 2023-10-02 ENCOUNTER — TRANSCRIBE ORDERS (OUTPATIENT)
Dept: ADMINISTRATIVE | Facility: HOSPITAL | Age: 88
End: 2023-10-02
Payer: MEDICARE

## 2023-10-02 ENCOUNTER — LAB (OUTPATIENT)
Dept: LAB | Facility: HOSPITAL | Age: 88
End: 2023-10-02
Payer: MEDICARE

## 2023-10-02 DIAGNOSIS — E83.52 HYPERCALCEMIA: ICD-10-CM

## 2023-10-02 DIAGNOSIS — M12.30: ICD-10-CM

## 2023-10-02 DIAGNOSIS — E11.8 DIABETIC COMPLICATION: ICD-10-CM

## 2023-10-02 DIAGNOSIS — R60.0 LOCALIZED EDEMA: ICD-10-CM

## 2023-10-02 DIAGNOSIS — E11.9 DIABETES MELLITUS WITHOUT COMPLICATION: ICD-10-CM

## 2023-10-02 DIAGNOSIS — N18.4 CHRONIC KIDNEY DISEASE, STAGE IV (SEVERE): Primary | ICD-10-CM

## 2023-10-02 DIAGNOSIS — N18.4 CHRONIC KIDNEY DISEASE, STAGE IV (SEVERE): ICD-10-CM

## 2023-10-02 LAB
ALBUMIN SERPL-MCNC: 4 G/DL (ref 3.5–5.2)
ANION GAP SERPL CALCULATED.3IONS-SCNC: 12.6 MMOL/L (ref 5–15)
BASOPHILS # BLD AUTO: 0.04 10*3/MM3 (ref 0–0.2)
BASOPHILS NFR BLD AUTO: 0.5 % (ref 0–1.5)
BUN SERPL-MCNC: 65 MG/DL (ref 8–23)
BUN/CREAT SERPL: 31.9 (ref 7–25)
CALCIUM SPEC-SCNC: 9.1 MG/DL (ref 8.6–10.5)
CHLORIDE SERPL-SCNC: 103 MMOL/L (ref 98–107)
CO2 SERPL-SCNC: 26.4 MMOL/L (ref 22–29)
CREAT SERPL-MCNC: 2.04 MG/DL (ref 0.57–1)
DEPRECATED RDW RBC AUTO: 41.6 FL (ref 37–54)
EGFRCR SERPLBLD CKD-EPI 2021: 23.4 ML/MIN/1.73
EOSINOPHIL # BLD AUTO: 0.13 10*3/MM3 (ref 0–0.4)
EOSINOPHIL NFR BLD AUTO: 1.7 % (ref 0.3–6.2)
ERYTHROCYTE [DISTWIDTH] IN BLOOD BY AUTOMATED COUNT: 12.1 % (ref 12.3–15.4)
FERRITIN SERPL-MCNC: 688 NG/ML (ref 13–150)
GLUCOSE SERPL-MCNC: 206 MG/DL (ref 65–99)
HCT VFR BLD AUTO: 31.5 % (ref 34–46.6)
HGB BLD-MCNC: 10.2 G/DL (ref 12–15.9)
IMM GRANULOCYTES # BLD AUTO: 0.02 10*3/MM3 (ref 0–0.05)
IMM GRANULOCYTES NFR BLD AUTO: 0.3 % (ref 0–0.5)
IRON 24H UR-MRATE: 47 MCG/DL (ref 37–145)
IRON SATN MFR SERPL: 13 % (ref 20–50)
LYMPHOCYTES # BLD AUTO: 0.92 10*3/MM3 (ref 0.7–3.1)
LYMPHOCYTES NFR BLD AUTO: 12.3 % (ref 19.6–45.3)
MCH RBC QN AUTO: 30.4 PG (ref 26.6–33)
MCHC RBC AUTO-ENTMCNC: 32.4 G/DL (ref 31.5–35.7)
MCV RBC AUTO: 94 FL (ref 79–97)
MONOCYTES # BLD AUTO: 1.1 10*3/MM3 (ref 0.1–0.9)
MONOCYTES NFR BLD AUTO: 14.6 % (ref 5–12)
NEUTROPHILS NFR BLD AUTO: 5.3 10*3/MM3 (ref 1.7–7)
NEUTROPHILS NFR BLD AUTO: 70.6 % (ref 42.7–76)
NRBC BLD AUTO-RTO: 0 /100 WBC (ref 0–0.2)
PHOSPHATE SERPL-MCNC: 5 MG/DL (ref 2.5–4.5)
PLATELET # BLD AUTO: 210 10*3/MM3 (ref 140–450)
PMV BLD AUTO: 10.9 FL (ref 6–12)
POTASSIUM SERPL-SCNC: 4.7 MMOL/L (ref 3.5–5.2)
RBC # BLD AUTO: 3.35 10*6/MM3 (ref 3.77–5.28)
SODIUM SERPL-SCNC: 142 MMOL/L (ref 136–145)
TIBC SERPL-MCNC: 370 MCG/DL (ref 298–536)
TRANSFERRIN SERPL-MCNC: 248 MG/DL (ref 200–360)
WBC NRBC COR # BLD: 7.51 10*3/MM3 (ref 3.4–10.8)

## 2023-10-02 PROCEDURE — 36415 COLL VENOUS BLD VENIPUNCTURE: CPT

## 2023-10-02 PROCEDURE — 80069 RENAL FUNCTION PANEL: CPT

## 2023-10-02 PROCEDURE — 85025 COMPLETE CBC W/AUTO DIFF WBC: CPT

## 2023-10-02 PROCEDURE — 84466 ASSAY OF TRANSFERRIN: CPT

## 2023-10-02 PROCEDURE — 82728 ASSAY OF FERRITIN: CPT

## 2023-10-02 PROCEDURE — 83540 ASSAY OF IRON: CPT

## 2023-10-11 ENCOUNTER — TRANSCRIBE ORDERS (OUTPATIENT)
Dept: ADMINISTRATIVE | Facility: HOSPITAL | Age: 88
End: 2023-10-11
Payer: MEDICARE

## 2023-10-11 DIAGNOSIS — N18.4 CHRONIC KIDNEY DISEASE, STAGE IV (SEVERE): ICD-10-CM

## 2023-10-11 DIAGNOSIS — D63.1 ANEMIA IN END-STAGE RENAL DISEASE: Primary | ICD-10-CM

## 2023-10-11 DIAGNOSIS — N18.6 ANEMIA IN END-STAGE RENAL DISEASE: Primary | ICD-10-CM

## 2023-10-16 PROBLEM — D63.1 ANEMIA OF CHRONIC RENAL FAILURE, STAGE 4 (SEVERE): Status: ACTIVE | Noted: 2023-10-16

## 2023-10-16 PROBLEM — N18.4 ANEMIA OF CHRONIC RENAL FAILURE, STAGE 4 (SEVERE): Status: ACTIVE | Noted: 2023-10-16

## 2023-10-23 ENCOUNTER — HOSPITAL ENCOUNTER (OUTPATIENT)
Dept: INFUSION THERAPY | Facility: HOSPITAL | Age: 88
Discharge: HOME | End: 2023-10-23
Admitting: INTERNAL MEDICINE
Payer: MEDICARE

## 2023-10-23 VITALS
OXYGEN SATURATION: 96 % | SYSTOLIC BLOOD PRESSURE: 139 MMHG | RESPIRATION RATE: 20 BRPM | HEIGHT: 65 IN | DIASTOLIC BLOOD PRESSURE: 57 MMHG | BODY MASS INDEX: 25.64 KG/M2 | HEART RATE: 80 BPM | TEMPERATURE: 97.7 F | WEIGHT: 153.88 LBS

## 2023-10-23 DIAGNOSIS — N18.4 STAGE 4 CHRONIC KIDNEY DISEASE: ICD-10-CM

## 2023-10-23 DIAGNOSIS — D63.1 ANEMIA OF CHRONIC RENAL FAILURE, STAGE 4 (SEVERE): Primary | ICD-10-CM

## 2023-10-23 DIAGNOSIS — N18.4 ANEMIA OF CHRONIC RENAL FAILURE, STAGE 4 (SEVERE): Primary | ICD-10-CM

## 2023-10-23 PROCEDURE — 96374 THER/PROPH/DIAG INJ IV PUSH: CPT

## 2023-10-23 PROCEDURE — 25010000002 FERRIC CARBOXYMALTOSE 750 MG/15ML SOLUTION: Performed by: INTERNAL MEDICINE

## 2023-10-23 PROCEDURE — 96365 THER/PROPH/DIAG IV INF INIT: CPT

## 2023-10-23 RX ADMIN — FERRIC CARBOXYMALTOSE INJECTION 750 MG: 50 INJECTION, SOLUTION INTRAVENOUS at 16:17

## 2023-10-29 ENCOUNTER — HOSPITAL ENCOUNTER (INPATIENT)
Facility: HOSPITAL | Age: 88
LOS: 7 days | Discharge: HOME HEALTH | DRG: 208 | End: 2023-11-05
Attending: EMERGENCY MEDICINE | Admitting: INTERNAL MEDICINE
Payer: MEDICARE

## 2023-10-29 ENCOUNTER — APPOINTMENT (OUTPATIENT)
Dept: GENERAL RADIOLOGY | Facility: HOSPITAL | Age: 88
DRG: 208 | End: 2023-10-29
Payer: MEDICARE

## 2023-10-29 DIAGNOSIS — Z78.9 DECREASED ACTIVITIES OF DAILY LIVING (ADL): ICD-10-CM

## 2023-10-29 DIAGNOSIS — J96.22 ACUTE ON CHRONIC RESPIRATORY FAILURE WITH HYPOXIA AND HYPERCAPNIA: Primary | ICD-10-CM

## 2023-10-29 DIAGNOSIS — J81.0 ACUTE PULMONARY EDEMA: ICD-10-CM

## 2023-10-29 DIAGNOSIS — R26.2 DIFFICULTY WALKING: ICD-10-CM

## 2023-10-29 DIAGNOSIS — J96.21 ACUTE ON CHRONIC RESPIRATORY FAILURE WITH HYPOXIA AND HYPERCAPNIA: Primary | ICD-10-CM

## 2023-10-29 DIAGNOSIS — J18.9 PNEUMONIA DUE TO INFECTIOUS ORGANISM, UNSPECIFIED LATERALITY, UNSPECIFIED PART OF LUNG: ICD-10-CM

## 2023-10-29 PROBLEM — E87.5 HYPERKALEMIA: Status: ACTIVE | Noted: 2023-10-29

## 2023-10-29 PROBLEM — J96.01 ACUTE RESPIRATORY FAILURE WITH HYPOXIA AND HYPERCAPNIA: Status: ACTIVE | Noted: 2023-10-29

## 2023-10-29 PROBLEM — J96.90 RESPIRATORY FAILURE: Status: ACTIVE | Noted: 2023-10-29

## 2023-10-29 PROBLEM — J44.1 COPD WITH ACUTE EXACERBATION: Status: ACTIVE | Noted: 2023-10-29

## 2023-10-29 PROBLEM — J81.1 PULMONARY EDEMA: Status: ACTIVE | Noted: 2023-10-29

## 2023-10-29 PROBLEM — J96.02 ACUTE RESPIRATORY FAILURE WITH HYPOXIA AND HYPERCAPNIA: Status: ACTIVE | Noted: 2023-10-29

## 2023-10-29 LAB
ALBUMIN SERPL-MCNC: 4.2 G/DL (ref 3.5–5.2)
ALBUMIN/GLOB SERPL: 1.3 G/DL
ALP SERPL-CCNC: 310 U/L (ref 39–117)
ALT SERPL W P-5'-P-CCNC: 27 U/L (ref 1–33)
ANION GAP SERPL CALCULATED.3IONS-SCNC: 11.1 MMOL/L (ref 5–15)
ARTERIAL PATENCY WRIST A: POSITIVE
AST SERPL-CCNC: 26 U/L (ref 1–32)
B PARAPERT DNA SPEC QL NAA+PROBE: NOT DETECTED
B PERT DNA SPEC QL NAA+PROBE: NOT DETECTED
BACTERIA UR QL AUTO: ABNORMAL /HPF
BASE EXCESS BLDA CALC-SCNC: -11.8 MMOL/L (ref -2–2)
BASE EXCESS BLDA CALC-SCNC: -6.2 MMOL/L (ref -2–2)
BASE EXCESS BLDA CALC-SCNC: -8.2 MMOL/L (ref -2–2)
BASOPHILS # BLD AUTO: 0.03 10*3/MM3 (ref 0–0.2)
BASOPHILS NFR BLD AUTO: 0.2 % (ref 0–1.5)
BDY SITE: ABNORMAL
BILIRUB SERPL-MCNC: 0.2 MG/DL (ref 0–1.2)
BILIRUB UR QL STRIP: NEGATIVE
BUN SERPL-MCNC: 78 MG/DL (ref 8–23)
BUN/CREAT SERPL: 29.1 (ref 7–25)
C PNEUM DNA NPH QL NAA+NON-PROBE: NOT DETECTED
CA-I BLDA-SCNC: 1.12 MMOL/L (ref 1.13–1.32)
CA-I BLDA-SCNC: 1.19 MMOL/L (ref 1.13–1.32)
CALCIUM SPEC-SCNC: 8.4 MG/DL (ref 8.6–10.5)
CHLORIDE BLDA-SCNC: 102 MMOL/L (ref 98–106)
CHLORIDE BLDA-SCNC: 106 MMOL/L (ref 98–106)
CHLORIDE SERPL-SCNC: 100 MMOL/L (ref 98–107)
CLARITY UR: ABNORMAL
CO2 SERPL-SCNC: 21.9 MMOL/L (ref 22–29)
COHGB MFR BLD: 0.1 % (ref 0–1.5)
COHGB MFR BLD: 0.3 % (ref 0–1.5)
COHGB MFR BLD: 0.3 % (ref 0–1.5)
COLOR UR: YELLOW
CREAT SERPL-MCNC: 2.68 MG/DL (ref 0.57–1)
D-LACTATE SERPL-SCNC: 1.7 MMOL/L (ref 0.5–2)
DEPRECATED RDW RBC AUTO: 45.1 FL (ref 37–54)
EGFRCR SERPLBLD CKD-EPI 2021: 16.8 ML/MIN/1.73
EOSINOPHIL # BLD AUTO: 0.01 10*3/MM3 (ref 0–0.4)
EOSINOPHIL NFR BLD AUTO: 0.1 % (ref 0.3–6.2)
ERYTHROCYTE [DISTWIDTH] IN BLOOD BY AUTOMATED COUNT: 12.2 % (ref 12.3–15.4)
FHHB: 1.5 % (ref 0–5)
FHHB: 11 % (ref 0–5)
FHHB: 9.6 % (ref 0–5)
FLUAV SUBTYP SPEC NAA+PROBE: NOT DETECTED
FLUAV SUBTYP SPEC NAA+PROBE: NOT DETECTED
FLUBV RNA ISLT QL NAA+PROBE: NOT DETECTED
FLUBV RNA ISLT QL NAA+PROBE: NOT DETECTED
GAS FLOW AIRWAY: ABNORMAL L/MIN
GLOBULIN UR ELPH-MCNC: 3.3 GM/DL
GLUCOSE BLDA-MCNC: 509 MG/DL (ref 65–99)
GLUCOSE BLDA-MCNC: 555 MG/DL (ref 65–99)
GLUCOSE BLDC GLUCOMTR-MCNC: 116 MG/DL (ref 70–99)
GLUCOSE BLDC GLUCOMTR-MCNC: 117 MG/DL (ref 70–99)
GLUCOSE BLDC GLUCOMTR-MCNC: 117 MG/DL (ref 70–99)
GLUCOSE BLDC GLUCOMTR-MCNC: 138 MG/DL (ref 70–99)
GLUCOSE BLDC GLUCOMTR-MCNC: 146 MG/DL (ref 70–99)
GLUCOSE BLDC GLUCOMTR-MCNC: 146 MG/DL (ref 70–99)
GLUCOSE BLDC GLUCOMTR-MCNC: 193 MG/DL (ref 70–99)
GLUCOSE BLDC GLUCOMTR-MCNC: 277 MG/DL (ref 70–99)
GLUCOSE BLDC GLUCOMTR-MCNC: 294 MG/DL (ref 70–99)
GLUCOSE BLDC GLUCOMTR-MCNC: 327 MG/DL (ref 70–99)
GLUCOSE BLDC GLUCOMTR-MCNC: 410 MG/DL (ref 70–99)
GLUCOSE BLDC GLUCOMTR-MCNC: 446 MG/DL (ref 70–99)
GLUCOSE BLDC GLUCOMTR-MCNC: 451 MG/DL (ref 70–99)
GLUCOSE BLDC GLUCOMTR-MCNC: 98 MG/DL (ref 70–99)
GLUCOSE SERPL-MCNC: 572 MG/DL (ref 65–99)
GLUCOSE UR STRIP-MCNC: ABNORMAL MG/DL
HADV DNA SPEC NAA+PROBE: NOT DETECTED
HCO3 BLDA-SCNC: 18.4 MMOL/L (ref 22–26)
HCO3 BLDA-SCNC: 19.8 MMOL/L (ref 22–26)
HCO3 BLDA-SCNC: 21.2 MMOL/L (ref 22–26)
HCOV 229E RNA SPEC QL NAA+PROBE: NOT DETECTED
HCOV HKU1 RNA SPEC QL NAA+PROBE: NOT DETECTED
HCOV NL63 RNA SPEC QL NAA+PROBE: NOT DETECTED
HCOV OC43 RNA SPEC QL NAA+PROBE: NOT DETECTED
HCT VFR BLD AUTO: 32.2 % (ref 34–46.6)
HGB BLD-MCNC: 9.5 G/DL (ref 12–15.9)
HGB BLDA-MCNC: 10.8 G/DL (ref 11.7–14.6)
HGB BLDA-MCNC: 11.1 G/DL (ref 11.7–14.6)
HGB BLDA-MCNC: 11.5 G/DL (ref 11.7–14.6)
HGB UR QL STRIP.AUTO: ABNORMAL
HMPV RNA NPH QL NAA+NON-PROBE: NOT DETECTED
HOLD SPECIMEN: NORMAL
HOLD SPECIMEN: NORMAL
HPIV1 RNA ISLT QL NAA+PROBE: NOT DETECTED
HPIV2 RNA SPEC QL NAA+PROBE: NOT DETECTED
HPIV3 RNA NPH QL NAA+PROBE: NOT DETECTED
HPIV4 P GENE NPH QL NAA+PROBE: NOT DETECTED
HYALINE CASTS UR QL AUTO: ABNORMAL /LPF
IMM GRANULOCYTES # BLD AUTO: 0.13 10*3/MM3 (ref 0–0.05)
IMM GRANULOCYTES NFR BLD AUTO: 0.9 % (ref 0–0.5)
INHALED O2 CONCENTRATION: 30 %
INHALED O2 CONCENTRATION: 50 %
INHALED O2 CONCENTRATION: 70 %
KETONES UR QL STRIP: NEGATIVE
LACTATE BLDA-SCNC: 0.8 MMOL/L (ref 0.5–2)
LACTATE BLDA-SCNC: 1.73 MMOL/L (ref 0.5–2)
LEUKOCYTE ESTERASE UR QL STRIP.AUTO: ABNORMAL
LYMPHOCYTES # BLD AUTO: 2.67 10*3/MM3 (ref 0.7–3.1)
LYMPHOCYTES NFR BLD AUTO: 18.2 % (ref 19.6–45.3)
M PNEUMO IGG SER IA-ACNC: NOT DETECTED
MAGNESIUM SERPL-MCNC: 2.2 MG/DL (ref 1.6–2.4)
MCH RBC QN AUTO: 29.7 PG (ref 26.6–33)
MCHC RBC AUTO-ENTMCNC: 29.5 G/DL (ref 31.5–35.7)
MCV RBC AUTO: 100.6 FL (ref 79–97)
METHGB BLD QL: 0.1 % (ref 0–1.5)
METHGB BLD QL: 0.2 % (ref 0–1.5)
METHGB BLD QL: 0.3 % (ref 0–1.5)
MODALITY: ABNORMAL
MONOCYTES # BLD AUTO: 1.92 10*3/MM3 (ref 0.1–0.9)
MONOCYTES NFR BLD AUTO: 13.1 % (ref 5–12)
MRSA DNA SPEC QL NAA+PROBE: NORMAL
NEUTROPHILS NFR BLD AUTO: 67.5 % (ref 42.7–76)
NEUTROPHILS NFR BLD AUTO: 9.95 10*3/MM3 (ref 1.7–7)
NITRITE UR QL STRIP: NEGATIVE
NOTE: ABNORMAL
NRBC BLD AUTO-RTO: 0 /100 WBC (ref 0–0.2)
NT-PROBNP SERPL-MCNC: ABNORMAL PG/ML (ref 0–1800)
OXYHGB MFR BLDV: 88.8 % (ref 94–99)
OXYHGB MFR BLDV: 89.9 % (ref 94–99)
OXYHGB MFR BLDV: 97.9 % (ref 94–99)
PCO2 BLDA: 33.4 MM HG (ref 35–45)
PCO2 BLDA: 52 MM HG (ref 35–45)
PCO2 BLDA: 96.8 MM HG (ref 35–45)
PEEP RESPIRATORY: 5 CM[H2O]
PH BLDA: 6.96 PH UNITS (ref 7.35–7.45)
PH BLDA: 7.2 PH UNITS (ref 7.35–7.45)
PH BLDA: 7.36 PH UNITS (ref 7.35–7.45)
PH UR STRIP.AUTO: <=5 [PH] (ref 5–8)
PHOSPHATE SERPL-MCNC: 5.9 MG/DL (ref 2.5–4.5)
PLATELET # BLD AUTO: 307 10*3/MM3 (ref 140–450)
PMV BLD AUTO: 10.2 FL (ref 6–12)
PO2 BLD: 171 MM[HG] (ref 0–500)
PO2 BLD: 183 MM[HG] (ref 0–500)
PO2 BLD: 253 MM[HG] (ref 0–500)
PO2 BLDA: 177.2 MM HG (ref 80–100)
PO2 BLDA: 54.8 MM HG (ref 80–100)
PO2 BLDA: 85.5 MM HG (ref 80–100)
POTASSIUM BLDA-SCNC: 5.39 MMOL/L (ref 3.5–5)
POTASSIUM BLDA-SCNC: 5.49 MMOL/L (ref 3.5–5)
POTASSIUM SERPL-SCNC: 5.4 MMOL/L (ref 3.5–5.2)
PROCALCITONIN SERPL-MCNC: 0.16 NG/ML (ref 0–0.25)
PROT SERPL-MCNC: 7.5 G/DL (ref 6–8.5)
PROT UR QL STRIP: ABNORMAL
RBC # BLD AUTO: 3.2 10*6/MM3 (ref 3.77–5.28)
RBC # UR STRIP: ABNORMAL /HPF
REF LAB TEST METHOD: ABNORMAL
RHINOVIRUS RNA SPEC NAA+PROBE: NOT DETECTED
RSV RNA NPH QL NAA+NON-PROBE: NOT DETECTED
RSV RNA NPH QL NAA+NON-PROBE: NOT DETECTED
SAO2 % BLDCOA: 89 % (ref 95–99)
SAO2 % BLDCOA: 90.4 % (ref 95–99)
SAO2 % BLDCOA: 98.5 % (ref 95–99)
SARS-COV-2 RNA RESP QL NAA+PROBE: NOT DETECTED
SARS-COV-2 RNA RESP QL NAA+PROBE: NOT DETECTED
SET MECH RESP RATE: 26
SODIUM BLDA-SCNC: 133.9 MMOL/L (ref 136–146)
SODIUM BLDA-SCNC: 135.9 MMOL/L (ref 136–146)
SODIUM SERPL-SCNC: 133 MMOL/L (ref 136–145)
SP GR UR STRIP: 1.02 (ref 1–1.03)
SQUAMOUS #/AREA URNS HPF: ABNORMAL /HPF
TROPONIN T SERPL HS-MCNC: 52 NG/L
TSH SERPL DL<=0.05 MIU/L-ACNC: 1.65 UIU/ML (ref 0.27–4.2)
UROBILINOGEN UR QL STRIP: ABNORMAL
VENTILATOR MODE: ABNORMAL
WBC # UR STRIP: ABNORMAL /HPF
WBC NRBC COR # BLD: 14.71 10*3/MM3 (ref 3.4–10.8)
WHOLE BLOOD HOLD COAG: NORMAL
WHOLE BLOOD HOLD SPECIMEN: NORMAL

## 2023-10-29 PROCEDURE — 94799 UNLISTED PULMONARY SVC/PX: CPT

## 2023-10-29 PROCEDURE — 82805 BLOOD GASES W/O2 SATURATION: CPT | Performed by: STUDENT IN AN ORGANIZED HEALTH CARE EDUCATION/TRAINING PROGRAM

## 2023-10-29 PROCEDURE — 25010000002 FUROSEMIDE PER 20 MG: Performed by: EMERGENCY MEDICINE

## 2023-10-29 PROCEDURE — 82375 ASSAY CARBOXYHB QUANT: CPT | Performed by: NURSE PRACTITIONER

## 2023-10-29 PROCEDURE — 87186 SC STD MICRODIL/AGAR DIL: CPT | Performed by: NURSE PRACTITIONER

## 2023-10-29 PROCEDURE — 94640 AIRWAY INHALATION TREATMENT: CPT

## 2023-10-29 PROCEDURE — 87637 SARSCOV2&INF A&B&RSV AMP PRB: CPT | Performed by: EMERGENCY MEDICINE

## 2023-10-29 PROCEDURE — 25010000002 PROPOFOL 10 MG/ML EMULSION: Performed by: EMERGENCY MEDICINE

## 2023-10-29 PROCEDURE — 25010000002 METHYLPREDNISOLONE PER 40 MG: Performed by: INTERNAL MEDICINE

## 2023-10-29 PROCEDURE — 82607 VITAMIN B-12: CPT | Performed by: INTERNAL MEDICINE

## 2023-10-29 PROCEDURE — 85025 COMPLETE CBC W/AUTO DIFF WBC: CPT | Performed by: EMERGENCY MEDICINE

## 2023-10-29 PROCEDURE — 36600 WITHDRAWAL OF ARTERIAL BLOOD: CPT | Performed by: NURSE PRACTITIONER

## 2023-10-29 PROCEDURE — 82375 ASSAY CARBOXYHB QUANT: CPT | Performed by: STUDENT IN AN ORGANIZED HEALTH CARE EDUCATION/TRAINING PROGRAM

## 2023-10-29 PROCEDURE — 87070 CULTURE OTHR SPECIMN AEROBIC: CPT | Performed by: NURSE PRACTITIONER

## 2023-10-29 PROCEDURE — 0BH17EZ INSERTION OF ENDOTRACHEAL AIRWAY INTO TRACHEA, VIA NATURAL OR ARTIFICIAL OPENING: ICD-10-PCS | Performed by: INTERNAL MEDICINE

## 2023-10-29 PROCEDURE — 36600 WITHDRAWAL OF ARTERIAL BLOOD: CPT

## 2023-10-29 PROCEDURE — 83880 ASSAY OF NATRIURETIC PEPTIDE: CPT | Performed by: EMERGENCY MEDICINE

## 2023-10-29 PROCEDURE — 71045 X-RAY EXAM CHEST 1 VIEW: CPT

## 2023-10-29 PROCEDURE — 84443 ASSAY THYROID STIM HORMONE: CPT | Performed by: NURSE PRACTITIONER

## 2023-10-29 PROCEDURE — 25010000002 CEFEPIME PER 500 MG: Performed by: EMERGENCY MEDICINE

## 2023-10-29 PROCEDURE — 84145 PROCALCITONIN (PCT): CPT | Performed by: INTERNAL MEDICINE

## 2023-10-29 PROCEDURE — 82746 ASSAY OF FOLIC ACID SERUM: CPT | Performed by: INTERNAL MEDICINE

## 2023-10-29 PROCEDURE — 83605 ASSAY OF LACTIC ACID: CPT | Performed by: EMERGENCY MEDICINE

## 2023-10-29 PROCEDURE — 94002 VENT MGMT INPAT INIT DAY: CPT

## 2023-10-29 PROCEDURE — 25010000002 FUROSEMIDE PER 20 MG: Performed by: INTERNAL MEDICINE

## 2023-10-29 PROCEDURE — 83735 ASSAY OF MAGNESIUM: CPT | Performed by: STUDENT IN AN ORGANIZED HEALTH CARE EDUCATION/TRAINING PROGRAM

## 2023-10-29 PROCEDURE — 83050 HGB METHEMOGLOBIN QUAN: CPT | Performed by: NURSE PRACTITIONER

## 2023-10-29 PROCEDURE — 93010 ELECTROCARDIOGRAM REPORT: CPT | Performed by: INTERNAL MEDICINE

## 2023-10-29 PROCEDURE — 99291 CRITICAL CARE FIRST HOUR: CPT | Performed by: STUDENT IN AN ORGANIZED HEALTH CARE EDUCATION/TRAINING PROGRAM

## 2023-10-29 PROCEDURE — 25010000002 VANCOMYCIN 5 G RECONSTITUTED SOLUTION: Performed by: EMERGENCY MEDICINE

## 2023-10-29 PROCEDURE — 25010000002 PROPOFOL 10 MG/ML EMULSION: Performed by: INTERNAL MEDICINE

## 2023-10-29 PROCEDURE — 82948 REAGENT STRIP/BLOOD GLUCOSE: CPT

## 2023-10-29 PROCEDURE — 84484 ASSAY OF TROPONIN QUANT: CPT | Performed by: EMERGENCY MEDICINE

## 2023-10-29 PROCEDURE — 99285 EMERGENCY DEPT VISIT HI MDM: CPT

## 2023-10-29 PROCEDURE — 0202U NFCT DS 22 TRGT SARS-COV-2: CPT | Performed by: NURSE PRACTITIONER

## 2023-10-29 PROCEDURE — 94761 N-INVAS EAR/PLS OXIMETRY MLT: CPT

## 2023-10-29 PROCEDURE — 82805 BLOOD GASES W/O2 SATURATION: CPT

## 2023-10-29 PROCEDURE — 94660 CPAP INITIATION&MGMT: CPT

## 2023-10-29 PROCEDURE — 87641 MR-STAPH DNA AMP PROBE: CPT | Performed by: NURSE PRACTITIONER

## 2023-10-29 PROCEDURE — 93005 ELECTROCARDIOGRAM TRACING: CPT | Performed by: EMERGENCY MEDICINE

## 2023-10-29 PROCEDURE — 81001 URINALYSIS AUTO W/SCOPE: CPT | Performed by: EMERGENCY MEDICINE

## 2023-10-29 PROCEDURE — 25810000003 SODIUM CHLORIDE 0.9 % SOLUTION: Performed by: EMERGENCY MEDICINE

## 2023-10-29 PROCEDURE — 82805 BLOOD GASES W/O2 SATURATION: CPT | Performed by: NURSE PRACTITIONER

## 2023-10-29 PROCEDURE — 83050 HGB METHEMOGLOBIN QUAN: CPT | Performed by: STUDENT IN AN ORGANIZED HEALTH CARE EDUCATION/TRAINING PROGRAM

## 2023-10-29 PROCEDURE — 87040 BLOOD CULTURE FOR BACTERIA: CPT | Performed by: EMERGENCY MEDICINE

## 2023-10-29 PROCEDURE — 80053 COMPREHEN METABOLIC PANEL: CPT | Performed by: EMERGENCY MEDICINE

## 2023-10-29 PROCEDURE — 5A1945Z RESPIRATORY VENTILATION, 24-96 CONSECUTIVE HOURS: ICD-10-PCS | Performed by: INTERNAL MEDICINE

## 2023-10-29 PROCEDURE — 36600 WITHDRAWAL OF ARTERIAL BLOOD: CPT | Performed by: STUDENT IN AN ORGANIZED HEALTH CARE EDUCATION/TRAINING PROGRAM

## 2023-10-29 PROCEDURE — 31500 INSERT EMERGENCY AIRWAY: CPT

## 2023-10-29 PROCEDURE — 83050 HGB METHEMOGLOBIN QUAN: CPT

## 2023-10-29 PROCEDURE — 82375 ASSAY CARBOXYHB QUANT: CPT

## 2023-10-29 PROCEDURE — 84100 ASSAY OF PHOSPHORUS: CPT | Performed by: STUDENT IN AN ORGANIZED HEALTH CARE EDUCATION/TRAINING PROGRAM

## 2023-10-29 PROCEDURE — 36415 COLL VENOUS BLD VENIPUNCTURE: CPT

## 2023-10-29 PROCEDURE — 87205 SMEAR GRAM STAIN: CPT | Performed by: NURSE PRACTITIONER

## 2023-10-29 PROCEDURE — 25010000002 HEPARIN (PORCINE) PER 1000 UNITS: Performed by: INTERNAL MEDICINE

## 2023-10-29 RX ORDER — ACETAMINOPHEN 325 MG/1
650 TABLET ORAL EVERY 4 HOURS PRN
Status: DISCONTINUED | OUTPATIENT
Start: 2023-10-29 | End: 2023-10-30

## 2023-10-29 RX ORDER — SODIUM CHLORIDE 0.9 % (FLUSH) 0.9 %
10 SYRINGE (ML) INJECTION EVERY 12 HOURS SCHEDULED
Status: DISCONTINUED | OUTPATIENT
Start: 2023-10-29 | End: 2023-11-05 | Stop reason: HOSPADM

## 2023-10-29 RX ORDER — POLYETHYLENE GLYCOL 3350 17 G/17G
17 POWDER, FOR SOLUTION ORAL DAILY PRN
Status: DISCONTINUED | OUTPATIENT
Start: 2023-10-29 | End: 2023-10-30

## 2023-10-29 RX ORDER — FUROSEMIDE 10 MG/ML
80 INJECTION INTRAMUSCULAR; INTRAVENOUS ONCE
Status: COMPLETED | OUTPATIENT
Start: 2023-10-29 | End: 2023-10-29

## 2023-10-29 RX ORDER — SODIUM CHLORIDE 9 MG/ML
40 INJECTION, SOLUTION INTRAVENOUS AS NEEDED
Status: DISCONTINUED | OUTPATIENT
Start: 2023-10-29 | End: 2023-11-05 | Stop reason: HOSPADM

## 2023-10-29 RX ORDER — FENTANYL CITRATE-0.9 % NACL/PF 10 MCG/ML
50-300 PLASTIC BAG, INJECTION (ML) INTRAVENOUS
Status: DISCONTINUED | OUTPATIENT
Start: 2023-10-29 | End: 2023-10-30

## 2023-10-29 RX ORDER — SODIUM CHLORIDE 0.9 % (FLUSH) 0.9 %
10 SYRINGE (ML) INJECTION AS NEEDED
Status: DISCONTINUED | OUTPATIENT
Start: 2023-10-29 | End: 2023-11-05 | Stop reason: HOSPADM

## 2023-10-29 RX ORDER — HEPARIN SODIUM 5000 [USP'U]/ML
5000 INJECTION, SOLUTION INTRAVENOUS; SUBCUTANEOUS EVERY 8 HOURS SCHEDULED
Status: DISCONTINUED | OUTPATIENT
Start: 2023-10-29 | End: 2023-11-05 | Stop reason: HOSPADM

## 2023-10-29 RX ORDER — HYDROCODONE BITARTRATE AND ACETAMINOPHEN 5; 325 MG/1; MG/1
1 TABLET ORAL EVERY 4 HOURS PRN
Status: DISCONTINUED | OUTPATIENT
Start: 2023-10-29 | End: 2023-10-29

## 2023-10-29 RX ORDER — AMOXICILLIN 250 MG
1 CAPSULE ORAL 2 TIMES DAILY
Status: DISCONTINUED | OUTPATIENT
Start: 2023-10-29 | End: 2023-10-30

## 2023-10-29 RX ORDER — NICOTINE POLACRILEX 4 MG
15 LOZENGE BUCCAL
Status: DISCONTINUED | OUTPATIENT
Start: 2023-10-29 | End: 2023-10-30

## 2023-10-29 RX ORDER — IBUPROFEN 600 MG/1
1 TABLET ORAL
Status: DISCONTINUED | OUTPATIENT
Start: 2023-10-29 | End: 2023-10-30

## 2023-10-29 RX ORDER — MORPHINE SULFATE 2 MG/ML
2 INJECTION, SOLUTION INTRAMUSCULAR; INTRAVENOUS
Status: ACTIVE | OUTPATIENT
Start: 2023-10-29 | End: 2023-11-01

## 2023-10-29 RX ORDER — SEVELAMER CARBONATE 800 MG/1
800 TABLET, FILM COATED ORAL
Status: ON HOLD | COMMUNITY
Start: 2023-10-09

## 2023-10-29 RX ORDER — CALCIUM POLYCARBOPHIL 625 MG
625 TABLET ORAL DAILY
Status: ON HOLD | COMMUNITY

## 2023-10-29 RX ORDER — BISACODYL 5 MG/1
5 TABLET, DELAYED RELEASE ORAL DAILY PRN
Status: DISCONTINUED | OUTPATIENT
Start: 2023-10-29 | End: 2023-10-29

## 2023-10-29 RX ORDER — VANCOMYCIN/0.9 % SOD CHLORIDE 1.5G/250ML
20 PLASTIC BAG, INJECTION (ML) INTRAVENOUS ONCE
Status: COMPLETED | OUTPATIENT
Start: 2023-10-29 | End: 2023-10-29

## 2023-10-29 RX ORDER — POLYETHYLENE GLYCOL 3350 17 G/17G
17 POWDER, FOR SOLUTION ORAL DAILY PRN
Status: DISCONTINUED | OUTPATIENT
Start: 2023-10-29 | End: 2023-10-29

## 2023-10-29 RX ORDER — NALOXONE HCL 0.4 MG/ML
0.4 VIAL (ML) INJECTION
Status: DISCONTINUED | OUTPATIENT
Start: 2023-10-29 | End: 2023-11-05 | Stop reason: HOSPADM

## 2023-10-29 RX ORDER — IPRATROPIUM BROMIDE AND ALBUTEROL SULFATE 2.5; .5 MG/3ML; MG/3ML
3 SOLUTION RESPIRATORY (INHALATION)
Status: DISCONTINUED | OUTPATIENT
Start: 2023-10-29 | End: 2023-10-30

## 2023-10-29 RX ORDER — BUDESONIDE 0.5 MG/2ML
0.5 INHALANT ORAL
Status: DISCONTINUED | OUTPATIENT
Start: 2023-10-29 | End: 2023-11-05 | Stop reason: HOSPADM

## 2023-10-29 RX ORDER — FAMOTIDINE 10 MG/ML
20 INJECTION, SOLUTION INTRAVENOUS EVERY 12 HOURS SCHEDULED
Status: DISCONTINUED | OUTPATIENT
Start: 2023-10-29 | End: 2023-10-29 | Stop reason: DRUGHIGH

## 2023-10-29 RX ORDER — ARFORMOTEROL TARTRATE 15 UG/2ML
15 SOLUTION RESPIRATORY (INHALATION)
Status: DISCONTINUED | OUTPATIENT
Start: 2023-10-29 | End: 2023-11-05 | Stop reason: HOSPADM

## 2023-10-29 RX ORDER — ETOMIDATE 2 MG/ML
20 INJECTION INTRAVENOUS ONCE
Status: COMPLETED | OUTPATIENT
Start: 2023-10-29 | End: 2023-10-29

## 2023-10-29 RX ORDER — FUROSEMIDE 10 MG/ML
40 INJECTION INTRAMUSCULAR; INTRAVENOUS EVERY 8 HOURS
Status: DISCONTINUED | OUTPATIENT
Start: 2023-10-29 | End: 2023-10-30

## 2023-10-29 RX ORDER — FUROSEMIDE 40 MG/1
80 TABLET ORAL
COMMUNITY
Start: 2023-08-03 | End: 2023-11-05 | Stop reason: HOSPADM

## 2023-10-29 RX ORDER — TEMAZEPAM 15 MG/1
15 CAPSULE ORAL NIGHTLY PRN
Status: ACTIVE | OUTPATIENT
Start: 2023-10-29 | End: 2023-11-05

## 2023-10-29 RX ORDER — AMOXICILLIN 250 MG
1 CAPSULE ORAL 2 TIMES DAILY
Status: DISCONTINUED | OUTPATIENT
Start: 2023-10-29 | End: 2023-10-29

## 2023-10-29 RX ORDER — DOXYCYCLINE HYCLATE 100 MG/1
1 CAPSULE ORAL EVERY 12 HOURS SCHEDULED
COMMUNITY
Start: 2023-10-27 | End: 2023-11-05 | Stop reason: HOSPADM

## 2023-10-29 RX ORDER — FAMOTIDINE 10 MG/ML
20 INJECTION, SOLUTION INTRAVENOUS DAILY
Status: DISCONTINUED | OUTPATIENT
Start: 2023-10-29 | End: 2023-11-01

## 2023-10-29 RX ORDER — PREDNISONE 20 MG/1
2 TABLET ORAL DAILY
COMMUNITY
Start: 2023-10-27 | End: 2023-11-05 | Stop reason: HOSPADM

## 2023-10-29 RX ORDER — FUROSEMIDE 40 MG/1
40 TABLET ORAL 2 TIMES WEEKLY
COMMUNITY
End: 2023-11-05 | Stop reason: HOSPADM

## 2023-10-29 RX ORDER — ATORVASTATIN CALCIUM 40 MG/1
40 TABLET, FILM COATED ORAL NIGHTLY
COMMUNITY
End: 2023-11-05 | Stop reason: HOSPADM

## 2023-10-29 RX ORDER — DEXTROSE MONOHYDRATE 25 G/50ML
10-50 INJECTION, SOLUTION INTRAVENOUS
Status: DISCONTINUED | OUTPATIENT
Start: 2023-10-29 | End: 2023-10-30

## 2023-10-29 RX ORDER — ALUMINA, MAGNESIA, AND SIMETHICONE 2400; 2400; 240 MG/30ML; MG/30ML; MG/30ML
15 SUSPENSION ORAL EVERY 6 HOURS PRN
Status: DISCONTINUED | OUTPATIENT
Start: 2023-10-29 | End: 2023-11-05 | Stop reason: HOSPADM

## 2023-10-29 RX ORDER — BISACODYL 10 MG
10 SUPPOSITORY, RECTAL RECTAL DAILY PRN
Status: DISCONTINUED | OUTPATIENT
Start: 2023-10-29 | End: 2023-10-29

## 2023-10-29 RX ORDER — NITROGLYCERIN 0.4 MG/1
0.4 TABLET SUBLINGUAL
Status: DISCONTINUED | OUTPATIENT
Start: 2023-10-29 | End: 2023-11-01

## 2023-10-29 RX ORDER — ONDANSETRON 2 MG/ML
4 INJECTION INTRAMUSCULAR; INTRAVENOUS EVERY 4 HOURS PRN
Status: DISCONTINUED | OUTPATIENT
Start: 2023-10-29 | End: 2023-11-05 | Stop reason: HOSPADM

## 2023-10-29 RX ORDER — ACETAMINOPHEN 325 MG/1
650 TABLET ORAL EVERY 4 HOURS PRN
Status: DISCONTINUED | OUTPATIENT
Start: 2023-10-29 | End: 2023-10-29

## 2023-10-29 RX ORDER — ROCURONIUM BROMIDE 10 MG/ML
100 INJECTION, SOLUTION INTRAVENOUS ONCE
Status: COMPLETED | OUTPATIENT
Start: 2023-10-29 | End: 2023-10-29

## 2023-10-29 RX ORDER — METHYLPREDNISOLONE SODIUM SUCCINATE 40 MG/ML
40 INJECTION, POWDER, LYOPHILIZED, FOR SOLUTION INTRAMUSCULAR; INTRAVENOUS EVERY 8 HOURS
Status: DISCONTINUED | OUTPATIENT
Start: 2023-10-29 | End: 2023-10-30

## 2023-10-29 RX ORDER — BISACODYL 10 MG
10 SUPPOSITORY, RECTAL RECTAL DAILY PRN
Status: DISCONTINUED | OUTPATIENT
Start: 2023-10-29 | End: 2023-10-30

## 2023-10-29 RX ORDER — HYDROCODONE BITARTRATE AND ACETAMINOPHEN 5; 325 MG/1; MG/1
1 TABLET ORAL EVERY 4 HOURS PRN
Status: DISCONTINUED | OUTPATIENT
Start: 2023-10-29 | End: 2023-10-30

## 2023-10-29 RX ADMIN — FUROSEMIDE 40 MG: 10 INJECTION, SOLUTION INTRAMUSCULAR; INTRAVENOUS at 14:13

## 2023-10-29 RX ADMIN — FUROSEMIDE 80 MG: 10 INJECTION, SOLUTION INTRAMUSCULAR; INTRAVENOUS at 06:07

## 2023-10-29 RX ADMIN — SODIUM ZIRCONIUM CYCLOSILICATE 10 G: 10 POWDER, FOR SUSPENSION ORAL at 10:06

## 2023-10-29 RX ADMIN — METHYLPREDNISOLONE SODIUM SUCCINATE 40 MG: 40 INJECTION INTRAMUSCULAR; INTRAVENOUS at 23:26

## 2023-10-29 RX ADMIN — INSULIN HUMAN 9.8 UNITS/HR: 1 INJECTION, SOLUTION INTRAVENOUS at 09:58

## 2023-10-29 RX ADMIN — ARFORMOTEROL TARTRATE 15 MCG: 15 SOLUTION RESPIRATORY (INHALATION) at 08:43

## 2023-10-29 RX ADMIN — FUROSEMIDE 40 MG: 10 INJECTION, SOLUTION INTRAMUSCULAR; INTRAVENOUS at 21:16

## 2023-10-29 RX ADMIN — HEPARIN SODIUM 5000 UNITS: 5000 INJECTION INTRAVENOUS; SUBCUTANEOUS at 14:13

## 2023-10-29 RX ADMIN — Medication 50 MCG/HR: at 09:52

## 2023-10-29 RX ADMIN — METHYLPREDNISOLONE SODIUM SUCCINATE 40 MG: 40 INJECTION INTRAMUSCULAR; INTRAVENOUS at 09:59

## 2023-10-29 RX ADMIN — SODIUM CHLORIDE 1000 ML: 9 INJECTION, SOLUTION INTRAVENOUS at 05:32

## 2023-10-29 RX ADMIN — BUDESONIDE 0.5 MG: 0.5 INHALANT RESPIRATORY (INHALATION) at 08:43

## 2023-10-29 RX ADMIN — INSULIN HUMAN 6.5 UNITS/HR: 1 INJECTION, SOLUTION INTRAVENOUS at 15:25

## 2023-10-29 RX ADMIN — IPRATROPIUM BROMIDE AND ALBUTEROL SULFATE 3 ML: .5; 3 SOLUTION RESPIRATORY (INHALATION) at 16:33

## 2023-10-29 RX ADMIN — BUDESONIDE 0.5 MG: 0.5 INHALANT RESPIRATORY (INHALATION) at 19:05

## 2023-10-29 RX ADMIN — VANCOMYCIN HYDROCHLORIDE 1500 MG: 500 INJECTION, POWDER, LYOPHILIZED, FOR SOLUTION INTRAVENOUS at 06:10

## 2023-10-29 RX ADMIN — IPRATROPIUM BROMIDE AND ALBUTEROL SULFATE 3 ML: .5; 3 SOLUTION RESPIRATORY (INHALATION) at 07:53

## 2023-10-29 RX ADMIN — SODIUM ZIRCONIUM CYCLOSILICATE 10 G: 10 POWDER, FOR SUSPENSION ORAL at 18:24

## 2023-10-29 RX ADMIN — HEPARIN SODIUM 5000 UNITS: 5000 INJECTION INTRAVENOUS; SUBCUTANEOUS at 21:16

## 2023-10-29 RX ADMIN — PROPOFOL 5 MCG/KG/MIN: 10 INJECTION, EMULSION INTRAVENOUS at 05:27

## 2023-10-29 RX ADMIN — ETOMIDATE 20 MG: 40 INJECTION, SOLUTION INTRAVENOUS at 04:58

## 2023-10-29 RX ADMIN — ARFORMOTEROL TARTRATE 15 MCG: 15 SOLUTION RESPIRATORY (INHALATION) at 19:04

## 2023-10-29 RX ADMIN — IPRATROPIUM BROMIDE AND ALBUTEROL SULFATE 3 ML: .5; 3 SOLUTION RESPIRATORY (INHALATION) at 11:26

## 2023-10-29 RX ADMIN — Medication 10 ML: at 10:04

## 2023-10-29 RX ADMIN — FAMOTIDINE 20 MG: 10 INJECTION INTRAVENOUS at 09:58

## 2023-10-29 RX ADMIN — PROPOFOL 25 MCG/KG/MIN: 10 INJECTION, EMULSION INTRAVENOUS at 14:31

## 2023-10-29 RX ADMIN — PROPOFOL 25 MCG/KG/MIN: 10 INJECTION, EMULSION INTRAVENOUS at 21:15

## 2023-10-29 RX ADMIN — METHYLPREDNISOLONE SODIUM SUCCINATE 40 MG: 40 INJECTION INTRAMUSCULAR; INTRAVENOUS at 14:13

## 2023-10-29 RX ADMIN — DOCUSATE SODIUM 50MG AND SENNOSIDES 8.6MG 1 TABLET: 8.6; 5 TABLET, FILM COATED ORAL at 10:04

## 2023-10-29 RX ADMIN — Medication 10 ML: at 21:15

## 2023-10-29 RX ADMIN — IPRATROPIUM BROMIDE AND ALBUTEROL SULFATE 3 ML: .5; 3 SOLUTION RESPIRATORY (INHALATION) at 19:05

## 2023-10-29 RX ADMIN — CEFEPIME 2000 MG: 2 INJECTION, POWDER, FOR SOLUTION INTRAVENOUS at 05:34

## 2023-10-29 RX ADMIN — ROCURONIUM BROMIDE 100 MG: 10 SOLUTION INTRAVENOUS at 04:58

## 2023-10-29 RX ADMIN — DOCUSATE SODIUM 50MG AND SENNOSIDES 8.6MG 1 TABLET: 8.6; 5 TABLET, FILM COATED ORAL at 21:15

## 2023-10-29 RX ADMIN — IPRATROPIUM BROMIDE AND ALBUTEROL SULFATE 3 ML: .5; 3 SOLUTION RESPIRATORY (INHALATION) at 23:22

## 2023-10-29 RX ADMIN — HEPARIN SODIUM 5000 UNITS: 5000 INJECTION INTRAVENOUS; SUBCUTANEOUS at 09:57

## 2023-10-29 NOTE — PROGRESS NOTES
[facility] Clinical Pharmacy Services: Vancomycin Pharmacokinetic Initial Consult Note    Charlette Rai is a 88 y.o. female who is on day 1 of pharmacy to dose vancomycin for Sepsis.    Consult Information  Consulting Provider: Yulia  Planned Duration of Therapy: 7  Was Patient Receiving Prior to Admission/Consult?: No  Loading Dose Ordered or Given: 1500mg mg on 10/29/23 at 0610  PK/PD Target: Dose by Levels    Other Antimicrobials: Cefepime    Imaging Reviewed?: Yes    Microbiology Data  MRSA PCR performed: 10/29/23 (ordered, not yet obtained) Result: Pending  Culture/Source:   Respiratory panel and blood cultures ordered    Vitals/Labs  Ht:  ; Wt: 72.3 kg (159 lb 6.3 oz)  Temp (24hrs), Av.3 °F (36.3 °C), Min:97 °F (36.1 °C), Max:97.5 °F (36.4 °C)   Estimated Creatinine Clearance: 15 mL/min (A) (by C-G formula based on SCr of 2.68 mg/dL (H)).       Results from last 7 days   Lab Units 10/29/23  0448   CREATININE mg/dL 2.68*   WBC 10*3/mm3 14.71*     Assessment/Plan:    Vancomycin Dose: Pulse dosing based on levels due to poor renal function. Vancomycin 1500mg administered in ED. Will order random level for 10/30 am and dose accordingly.    Vanc Random ordered for 10/30/23 at 0600  Patient has order for Complete Metabolic Panel per MD has been ordered    Pharmacy will follow patient's kidney function and will adjust doses and obtain levels as necessary. Thank you for involving pharmacy in this patient's care. Please contact pharmacy with any questions or concerns.                           Tenisha Gomes  Clinical Pharmacist

## 2023-10-29 NOTE — ED PROVIDER NOTES
Time: 5:07 AM EDT  Date of encounter:  10/29/2023  Independent Historian/Clinical History and Information was obtained by:   Family and EMS    History is limited by: Acuity of Condition    Chief Complaint: Respiratory distress      History of Present Illness:  Patient is a 88 y.o. year old female who presents to the emergency department for evaluation of respiratory distress    EMS and family report that the patient's had increasing shortness of breath over the past several days with an acute change early this morning.  Patient awoke in acute respiratory distress.  EMS was notified found the patient struggling to breathe and placed patient on CPAP in route to the hospital.    On arrival patient was nonverbal and unable to answer questions.  She would open her eyes to voice.    HPI    Patient Care Team  Primary Care Provider: Clyde Bailey MD    Past Medical History:     No Known Allergies  Past Medical History:   Diagnosis Date    Allergic rhinitis     Anemia     Arthritis     Asthma     USE INHALERS AND NEBULIZERS    Back pain     Bladder disorder     Cancer     LEFT LUNG CANCER 2005 SURGERY AND CHEMO DONE  AND CURRENTLY 4/ 14/22  RIGHT LUNG CANCER HAS ONLY RECEIVED RADIATION THUS FAR    Chronic kidney disease     stage 3    CKD (chronic kidney disease) stage 4, GFR 15-29 ml/min     Condition not found     ulcer    Congestive heart failure (CHF)     FOLLOWED BY DR AQUINO. DENIES CP BUT DOES HAVE SOA CHRONIC ISSUE COPD/LUNG CANCER    COPD (chronic obstructive pulmonary disease)     FOLLOWED BY DR MARIAJOSE BAILEY    Coronary artery disease     DENIES CP BUT DOES GET SOA MOST OF THE TIME WITH EXERTION BUT OCC AT REST CHRONIC ISSUE COPD/LUNG CANCER    Deep vein thrombosis     Diabetes mellitus     DOES NOT CHECK BS DAILY    Disease of thyroid gland     HYPOTHYROIDISM    Essential hypertension     Gastric ulcer     GERD (gastroesophageal reflux disease)     Heart murmur     History of transfusion     NO ISSUES POST  TRANSFUSION WAS MANY YEARS AGO    Hyperlipemia     Leukocytopenia     FOLLOWED BY DR MIR BAILEY    Limb swelling     Lumbago     Lumbar spinal stenosis     Lung cancer     Migraine headache     Multiple joint pain     Osteopenia     Reflux esophagitis     Shortness of breath     Thyroid nodule     HAS ULTRASOUND YEARLY BEING MONITORED    Vascular disease     Vitamin D deficiency      Past Surgical History:   Procedure Laterality Date    ABDOMINAL SURGERY      APPENDECTOMY      ARTERIOVENOUS FISTULA/SHUNT SURGERY Left 05/10/2022    Procedure: Left basilic vein transposition;  Surgeon: Wan Barksdale MD;  Location: [facility] MAIN OR;  Service: Vascular;  Laterality: Left;    ARTERIOVENOUS FISTULA/SHUNT SURGERY Left 3/23/2023    Procedure: Ligation of left arm arteriovenous fistula;  Surgeon: Wan Barksdale MD;  Location: [facility] MAIN OR;  Service: Vascular;  Laterality: Left;    CARDIAC CATHETERIZATION  1996    CARDIAC SURGERY      CARDIAC SURGERY      fluid drained from heart    CATARACT EXTRACTION, BILATERAL  2003    COLONOSCOPY  2014    ENDOSCOPY  2016 2019    FEMORAL ARTERY STENT Bilateral     HYSTERECTOMY      LUNG BIOPSY Left 2005    lobectomy upper lung caner    LUNG VOLUME REDUCTION      OTHER SURGICAL HISTORY      artifical joints/limbs    REPLACEMENT TOTAL KNEE Left 2016    UPPER GASTROINTESTINAL ENDOSCOPY       Family History   Problem Relation Age of Onset    Arthritis Mother     Arthritis Father     Cancer Brother     Diabetes Brother     Other Brother         blood disease     Prostate cancer Brother     Cancer Brother     Prostate cancer Brother     Cancer Brother     Cancer Brother     Malig Hyperthermia Neg Hx     Colon cancer Neg Hx        Home Medications:  Prior to Admission medications    Medication Sig Start Date End Date Taking? Authorizing Provider   albuterol sulfate  (90 Base) MCG/ACT inhaler Inhale 2 puffs Every 4 (Four) Hours As Needed for Wheezing.    Provider, MD Sarah    amLODIPine (NORVASC) 10 MG tablet Take 1 tablet by mouth Daily. 12/13/22   León Sanchez MD   aspirin 81 MG chewable tablet Chew 1 tablet Daily.    Sarah Parker MD   cilostazol (PLETAL) 100 MG tablet Take 1 tablet by mouth 2 (two) times a day. PER DR SAYRA PONCE TO CONTINUE PLETAL 4/17/21   Sarah Parker MD   dexlansoprazole (DEXILANT) 60 MG capsule Take 1 capsule by mouth. 9/23/22   Sarah Parker MD   Diclofenac Sodium (Voltaren) 1 % gel gel Apply 4 g topically to the appropriate area as directed 4 (Four) Times a Day As Needed (pain). 2/8/22   Alis Starks PA   ergocalciferol (ERGOCALCIFEROL) 1.25 MG (83496 UT) capsule Take 1 capsule by mouth 1 (One) Time Per Week. 3/4/22   Emergency, Nurse Epic, RN   famotidine (PEPCID) 40 MG tablet Take 1 tablet by mouth Daily. 1/10/22   Paloma James MD   fenofibrate (FENOGLIDE) 120 MG tablet Take 1 tablet by mouth Daily. 1/10/22   Paloma James MD   ferrous sulfate 325 (65 FE) MG tablet Take 1 tablet by mouth 2 (two) times a day. 3/8/21   Sarah Parker MD   furosemide (LASIX) 40 MG tablet Take 2 tablets by mouth. TAKE 2 TABLETS ON MON-WED-FRI    Sarah Parker MD   glucose blood (OneTouch Verio) test strip Use test strips to test blood sugar three times daily for diagnosis E11.9 Diabetes 1/20/22   Paloma James MD   ipratropium-albuterol (DUO-NEB) 0.5-2.5 mg/3 ml nebulizer INHALE 3ML (1 VIAL) VIA NEBULIZER EVERY 12 HOURS 9/13/22   Sarah Parker MD   Lancets (onetouch ultrasoft) lancets Use lancets to test blood sugar 3 times daily 1/20/22   Paloma James MD   levocetirizine (XYZAL) 5 MG tablet TAKE 1 TABLET DAILY 6/23/23   Paloma James MD   linagliptin (Tradjenta) 5 MG tablet tablet Take 1 tablet by mouth Daily. 1/10/22   Paloma James MD   methylcellulose, Laxative, (CITRUCEL) 500 MG tablet tablet Take 1,250 mg by mouth.    Provider, MD Sarah    nebivolol (Bystolic) 10 MG tablet Take 1 tablet by mouth Daily. 23   León Sanchez MD   nitroglycerin (NITROSTAT) 0.4 MG SL tablet Nitrostat 0.4 mg sublingual tablet, sublingual place 1 tablet (0.4 mg) by buccal route at the first sign of an attack; no more than 3 tabs are recommended within a 15 minute period.   Active    Provider, MD Sarah   OneTouch Ultra test strip 1 each by Other route As Needed (blood sugar check). 4 times a day 22   Paloma James MD   rosuvastatin (CRESTOR) 20 MG tablet Take 1 tablet by mouth Daily. 23   León Sanchez MD   Symbicort 160-4.5 MCG/ACT inhaler Inhale 2 puffs 2 (two) times a day. 21   ProviderSarah MD   traMADol (ULTRAM) 50 MG tablet Take 1 tablet by mouth Daily As Needed (once daily as needed). 1/10/22   Paloma James MD   Trulicity 1.5 MG/0.5ML solution pen-injector INJECT 0.5ML (=1.5 MG)     SUBCUTANEOUSLY INTO THE    APPROPRIATE AREA AS        DIRECTED EVERY 7 DAYS 22   Paloma James MD        Social History:   Social History     Tobacco Use    Smoking status: Former     Packs/day: 1.00     Years: 30.00     Additional pack years: 0.00     Total pack years: 30.00     Types: Cigarettes     Start date:      Quit date: 2004     Years since quittin.3    Smokeless tobacco: Never   Vaping Use    Vaping Use: Never used   Substance Use Topics    Alcohol use: Never    Drug use: Never         Review of Systems:  Review of Systems   Unable to perform ROS: Acuity of condition   Respiratory:  Positive for shortness of breath.         Physical Exam:  /58   Pulse 96   Temp 97 °F (36.1 °C) (Bladder)   Resp 28   Wt 72.3 kg (159 lb 6.3 oz)   LMP  (LMP Unknown)   SpO2 99%   BMI 26.52 kg/m²     Physical Exam  Vitals and nursing note reviewed.   Constitutional:       General: She is in acute distress.      Appearance: Normal appearance. She is ill-appearing.      Comments: On NIPPV   HENT:       Head: Normocephalic and atraumatic.      Jaw: There is normal jaw occlusion.   Eyes:      General: Lids are normal.      Extraocular Movements: Extraocular movements intact.      Conjunctiva/sclera: Conjunctivae normal.      Pupils: Pupils are equal, round, and reactive to light.   Cardiovascular:      Rate and Rhythm: Regular rhythm. Tachycardia present.      Pulses: Normal pulses.      Heart sounds: Normal heart sounds.   Pulmonary:      Effort: Tachypnea, accessory muscle usage and respiratory distress present.      Breath sounds: Rhonchi and rales present. No wheezing.   Abdominal:      General: Abdomen is flat.      Palpations: Abdomen is soft.      Tenderness: There is no abdominal tenderness. There is no guarding or rebound.   Musculoskeletal:         General: Normal range of motion.      Cervical back: Normal range of motion and neck supple.      Right lower leg: Edema present.      Left lower leg: Edema present.   Skin:     General: Skin is warm and dry.   Neurological:      Mental Status: She is oriented to person, place, and time. Mental status is at baseline. She is lethargic.   Psychiatric:         Mood and Affect: Mood normal.               Procedures:  Intubation    Date/Time: 10/29/2023 6:45 AM    Performed by: Ildefonso Hilton MD  Authorized by: Ildefonso Hilton MD    Consent:     Consent obtained:  Emergent situation  Pre-procedure details:     Indications: respiratory failure      Patient status:  Unresponsive    Look externally: no concerns      Mouth opening - incisor distance:  2 finger widths    Hyoid-mental distance: 2 finger widths      Hyoid-thyroid distance: 2 or more finger widths      Mallampati score:  II    Obstruction: none      Neck mobility: normal      Pharmacologic strategy: RSI      Induction agents:  Etomidate    Paralytics:  Rocuronium  Procedure details:     Preoxygenation:  BiLevel    CPR in progress: no      Number of attempts:  1  Successful intubation attempt details:      Intubation method:  Oral    Intubation technique: video assisted      Laryngoscope blade:  Mac 4    Bougie used: no      Grade view: I      Tube size (mm):  8.0    Tube type:  Cuffed    Tube visualized through cords: yes    Placement assessment:     ETT at teeth/gumline (cm):  23    Tube secured with:  ETT wynn    Breath sounds:  Equal    Placement verification: chest rise, colorimetric ETCO2, CXR verification, direct visualization and waveform ETCO2      CXR findings:  Appropriate position  Post-procedure details:     Procedure completion:  Tolerated well, no immediate complications        Medical Decision Making:      Comorbidities that affect care:    History of lung cancer, COPD, hypertension, coronary artery disease, chronic kidney disease, diabetes, congestive heart failure    External Notes reviewed:    Previous Clinic Note: Cardiology clinic visit July 2023      The following orders were placed and all results were independently analyzed by me:  Orders Placed This Encounter   Procedures    Intubation    Blood Culture - Blood,    Blood Culture - Blood,    COVID-19, FLU A/B, RSV PCR - Swab, Nasopharynx    XR Chest 1 View    [place] Draw    Comprehensive Metabolic Panel    BNP    Single High Sensitivity Troponin T    CBC Auto Differential    Lactic Acid, Plasma    ABG with Co-Ox and Electrolytes    Urinalysis With Microscopic If Indicated (No Culture) - Urine, Clean Catch    Urinalysis, Microscopic Only - Urine, Clean Catch    CBC Auto Differential    Comprehensive Metabolic Panel    Procalcitonin    NPO Diet NPO Type: Strict NPO    Undress & Gown    Continuous Pulse Oximetry    Vital Signs    Target Arousal Level RASS -1 to -2    Vital Signs    Notify Provider (With Default Parameters)    Intake & Output    Weigh Patient    Oral Care    Telemetry - Maintain IV Access    Telemetry - Place Orders & Notify Provider of Results When Patient Experiences Acute Chest Pain, Dysrhythmia or Respiratory Distress     Activity - Strict Bed Rest    Code Status and Medical Interventions:    IP General Consult (Use specialty-specific consult if known)    IP General Consult (Use specialty-specific consult if known)    Pulmonology (on-call MD unless specified)    Inpatient Case Management  Consult    Oxygen Therapy- Nasal Cannula; Titrate 1-6 LPM Per SpO2; 90 - 95%    ECG 12 Lead ED Triage Standing Order; SOA    Insert Peripheral IV    Insert Peripheral IV    Inpatient Admission    CBC & Differential    Green Top (Gel)    Lavender Top    Gold Top - SST    Light Blue Top       Medications Given in the Emergency Department:  Medications   sodium chloride 0.9 % flush 10 mL (has no administration in time range)   propofol (DIPRIVAN) infusion 10 mg/mL 100 mL (5 mcg/kg/min × 69.8 kg Intravenous New Bag 10/29/23 9728)   vancomycin IVPB 1500 mg in 0.9% NaCl (Premix) 500 mL (1,500 mg Intravenous New Bag 10/29/23 8110)   sodium chloride 0.9 % flush 10 mL (has no administration in time range)   sodium chloride 0.9 % flush 10 mL (has no administration in time range)   sodium chloride 0.9 % infusion 40 mL (has no administration in time range)   acetaminophen (TYLENOL) tablet 650 mg (has no administration in time range)   HYDROcodone-acetaminophen (NORCO) 5-325 MG per tablet 1 tablet (has no administration in time range)   morphine injection 2 mg (has no administration in time range)     And   naloxone (NARCAN) injection 0.4 mg (has no administration in time range)   aluminum-magnesium hydroxide-simethicone (MAALOX MAX) 400-400-40 MG/5ML suspension 15 mL (has no administration in time range)   sennosides-docusate (PERICOLACE) 8.6-50 MG per tablet 1 tablet (has no administration in time range)     And   polyethylene glycol (MIRALAX) packet 17 g (has no administration in time range)     And   bisacodyl (DULCOLAX) EC tablet 5 mg (has no administration in time range)     And   bisacodyl (DULCOLAX) suppository 10 mg (has no administration  in time range)   ondansetron (ZOFRAN) injection 4 mg (has no administration in time range)   temazepam (RESTORIL) capsule 15 mg (has no administration in time range)   Pharmacy to Dose enoxaparin (LOVENOX) (has no administration in time range)   nitroglycerin (NITROSTAT) SL tablet 0.4 mg (has no administration in time range)   famotidine (PEPCID) injection 20 mg (has no administration in time range)   ipratropium-albuterol (DUO-NEB) nebulizer solution 3 mL (has no administration in time range)   methylPREDNISolone sodium succinate (SOLU-Medrol) injection 40 mg (has no administration in time range)   etomidate (AMIDATE) injection 20 mg (20 mg Intravenous Given 10/29/23 0458)   rocuronium (ZEMURON) injection 100 mg (100 mg Intravenous Given 10/29/23 0458)   cefepime (MAXIPIME) 2000 mg/100 mL 0.9% NS (mbp) (0 mg Intravenous Stopped 10/29/23 0613)   sodium chloride 0.9 % bolus 1,000 mL (1,000 mL Intravenous New Bag 10/29/23 0532)   furosemide (LASIX) injection 80 mg (80 mg Intravenous Given 10/29/23 0607)        ED Course:    ED Course as of 10/29/23 0647   Sun Oct 29, 2023   0535 My interpretation of EKG: Sinus tachycardia 101, nonspecific T wave abnormalities, essentially unchanged from previous. [JS]      ED Course User Index  [JS] Ildefonso Hilton MD       Labs:    Lab Results (last 24 hours)       Procedure Component Value Units Date/Time    CBC & Differential [[id number]]  (Abnormal) Collected: 10/29/23 0448    Specimen: Blood Updated: 10/29/23 0504    Narrative:      The following orders were created for panel order CBC & Differential.  Procedure                               Abnormality         Status                     ---------                               -----------         ------                     CBC Auto Differential[[id number]]        Abnormal            Final result                 Please view results for these tests on the individual orders.    Comprehensive Metabolic Panel [[id number]]  (Abnormal)  Collected: 10/29/23 0448    Specimen: Blood Updated: 10/29/23 0546     Glucose 572 mg/dL      BUN 78 mg/dL      Creatinine 2.68 mg/dL      Sodium 133 mmol/L      Potassium 5.4 mmol/L      Chloride 100 mmol/L      CO2 21.9 mmol/L      Calcium 8.4 mg/dL      Total Protein 7.5 g/dL      Albumin 4.2 g/dL      ALT (SGPT) 27 U/L      AST (SGOT) 26 U/L      Alkaline Phosphatase 310 U/L      Total Bilirubin 0.2 mg/dL      Globulin 3.3 gm/dL      A/G Ratio 1.3 g/dL      BUN/Creatinine Ratio 29.1     Anion Gap 11.1 mmol/L      eGFR 16.8 mL/min/1.73     Narrative:      GFR Normal >60  Chronic Kidney Disease <60  Kidney Failure <15    The GFR formula is only valid for adults with stable renal function between ages 18 and 70.    BNP [[id number]]  (Abnormal) Collected: 10/29/23 0448    Specimen: Blood Updated: 10/29/23 0531     proBNP 10,726.0 pg/mL     Narrative:      This assay is used as an aid in the diagnosis of individuals suspected of having heart failure. It can be used as an aid in the diagnosis of acute decompensated heart failure (ADHF) in patients presenting with signs and symptoms of ADHF to the emergency department (ED). In addition, NT-proBNP of <300 pg/mL indicates ADHF is not likely.    Age Range Result Interpretation  NT-proBNP Concentration (pg/mL:      <50             Positive            >450                   Gray                 300-450                    Negative             <300    50-75           Positive            >900                  Gray                300-900                  Negative            <300      >75             Positive            >1800                  Gray                300-1800                  Negative            <300    Single High Sensitivity Troponin T [[id number]]  (Abnormal) Collected: 10/29/23 0448    Specimen: Blood Updated: 10/29/23 0542     HS Troponin T 52 ng/L     Narrative:      High Sensitive Troponin T Reference Range:  <10.0 ng/L- Negative Female for AMI  <15.0 ng/L-  Negative Male for AMI  >=10 - Abnormal Female indicating possible myocardial injury.  >=15 - Abnormal Male indicating possible myocardial injury.   Clinicians would have to utilize clinical acumen, EKG, Troponin, and serial changes to determine if it is an Acute Myocardial Infarction or myocardial injury due to an underlying chronic condition.         CBC Auto Differential [[id number]]  (Abnormal) Collected: 10/29/23 0448    Specimen: Blood Updated: 10/29/23 0504     WBC 14.71 10*3/mm3      RBC 3.20 10*6/mm3      Hemoglobin 9.5 g/dL      Hematocrit 32.2 %      .6 fL      MCH 29.7 pg      MCHC 29.5 g/dL      RDW 12.2 %      RDW-SD 45.1 fl      MPV 10.2 fL      Platelets 307 10*3/mm3      Neutrophil % 67.5 %      Lymphocyte % 18.2 %      Monocyte % 13.1 %      Eosinophil % 0.1 %      Basophil % 0.2 %      Immature Grans % 0.9 %      Neutrophils, Absolute 9.95 10*3/mm3      Lymphocytes, Absolute 2.67 10*3/mm3      Monocytes, Absolute 1.92 10*3/mm3      Eosinophils, Absolute 0.01 10*3/mm3      Basophils, Absolute 0.03 10*3/mm3      Immature Grans, Absolute 0.13 10*3/mm3      nRBC 0.0 /100 WBC     Lactic Acid, Plasma [[id number]]  (Normal) Collected: 10/29/23 0448    Specimen: Blood Updated: 10/29/23 0529     Lactate 1.7 mmol/L     ABG with Co-Ox and Electrolytes [[id number]]  (Abnormal) Collected: 10/29/23 0450    Specimen: Arterial Blood Updated: 10/29/23 0458     pH, Arterial 6.958 pH units      pCO2, Arterial 96.8 mm Hg      pO2, Arterial 85.5 mm Hg      HCO3, Arterial 21.2 mmol/L      Base Excess, Arterial -11.8 mmol/L      O2 Saturation, Arterial 90.4 %      Hemoglobin, Blood Gas 10.8 g/dL      Carboxyhemoglobin 0.3 %      Methemoglobin 0.20 %      Oxyhemoglobin 89.9 %      FHHB 9.6 %      Home's Test Positive     Note --     Site Arterial: right radial     Modality BiPap     FIO2 50 %      Flow Rate --     Sodium, Arterial 135.9 mmol/L      Potassium, Arterial 5.39 mmol/L      Ionized Calcium, Arterial 1.19  mmol/L      Chloride, Arterial 102 mmol/L      Glucose, Arterial 555 mg/dL      Lactate, Arterial 1.73 mmol/L      PO2/FIO2 171    Narrative:      IPAP:18 EPAP:8    COVID-19, FLU A/B, RSV PCR - Swab, Nasopharynx [[id number]]  (Normal) Collected: 10/29/23 0504    Specimen: Swab from Nasopharynx Updated: 10/29/23 0609     COVID19 Not Detected     Influenza A PCR Not Detected     Influenza B PCR Not Detected     RSV, PCR Not Detected    Narrative:      Fact sheet for providers: https://www.fda.gov/media/206172/download    Fact sheet for patients: https://www.fda.gov/media/456411/download    Test performed by PCR.    Blood Culture - Blood, Arm, Right [[id number]] Collected: 10/29/23 0522    Specimen: Blood from Arm, Right Updated: 10/29/23 0529    Blood Culture - Blood, Arm, Left [[id number]] Collected: 10/29/23 0522    Specimen: Blood from Arm, Left Updated: 10/29/23 0528    Urinalysis With Microscopic If Indicated (No Culture) - Urine, Clean Catch [[id number]]  (Abnormal) Collected: 10/29/23 0522    Specimen: Urine, Clean Catch Updated: 10/29/23 0538     Color, UA Yellow     Appearance, UA Cloudy     pH, UA <=5.0     Specific Gravity, UA 1.018     Glucose, UA >=1000 mg/dL (3+)     Ketones, UA Negative     Bilirubin, UA Negative     Blood, UA Small (1+)     Protein,  mg/dL (2+)     Leuk Esterase, UA Small (1+)     Nitrite, UA Negative     Urobilinogen, UA 1.0 E.U./dL    Urinalysis, Microscopic Only - Urine, Clean Catch [[id number]]  (Abnormal) Collected: 10/29/23 0522    Specimen: Urine, Clean Catch Updated: 10/29/23 0538     RBC, UA 11-20 /HPF      WBC, UA 21-50 /HPF      Bacteria, UA 2+ /HPF      Squamous Epithelial Cells, UA 7-12 /HPF      Hyaline Casts, UA 3-6 /LPF      Methodology Automated Microscopy             Imaging:    XR Chest 1 View    Result Date: 10/29/2023  PROCEDURE: XR CHEST 1 VW  COMPARISON: [facility], CR, XR CHEST 2 VW, 1/11/2023, 12:43.  [facility], CT,  CT CHEST WO CONTRAST DIAGNOSTIC, 3/01/2023, 15:47.  INDICATIONS: SOA. INTUBATION. OG TUBE  FINDINGS:  There has been interval placement of endotracheal tube with the tip 4 cm above the rebecca.  Orogastric tube is in place the tip projecting over the fundus of the stomach.  Patient is rotated to left.  Heart size is within normal limits.  There is increasing airspace disease within both lungs which may be secondary to pulmonary edema.  There is a small left pleural effusion.  There is chronic left apical parenchymal scarring.  There is linear parenchymal scarring within the right upper lobe.  No definite right pleural effusion.  No evidence pneumothorax.        1. Interval placement of endotracheal tube and orogastric tubes which are in good position. 2. Increasing bilateral airspace disease favored to be secondary to pulmonary edema 3. Small left pleural effusion       OMAIRA GIRON MD       Electronically Signed and Approved By: OMAIRA GIRON MD on 10/29/2023 at 5:30                Differential Diagnosis and Discussion:    Dyspnea: Differential diagnosis includes but is not limited to metabolic acidosis, neurological disorders, psychogenic, asthma, pneumothorax, upper airway obstruction, COPD, pneumonia, noncardiogenic pulmonary edema, interstitial lung disease, anemia, congestive heart failure, and pulmonary embolism    All labs were reviewed and interpreted by me.  All X-rays impressions were independently interpreted by me.  EKG was interpreted by me.    MDM  Number of Diagnoses or Management Options  Acute on chronic respiratory failure with hypoxia and hypercapnia  Acute pulmonary edema  Pneumonia due to infectious organism, unspecified laterality, unspecified part of lung  Diagnosis management comments: Sepsis criteria was met in the emergency department and the Sepsis protocol (including antibiotic administration) was initiated.      SIRS criteria considered:   1.  Temperature > 100.4 or <98.6    2.  Heart  Rate > 90    3.  Respiratory Rate > 22    4.  WBC > 12K or <4K.             Severe Sepsis:     Respiratory: Mechanical Ventilation or BiPAP  Hypotension: SBP > 90 or MAP < 65  Renal: Creatinine > 2  Metabolic: Lactic Acid > 2  Hematologic: Platelets < 100K or INR > 1.5  Hepatic: BILI  >  2  CNS: Sudden AMS     Septic Shock:     Severe Sepsis + Persistent hypotension or Lactic Acid > 4     Normal saline bolus, Antibiotics, and final disposition was based on these definitions.        Sepsis was recognized at 0507    Antibiotics were ordered.       30 mL/kg bolus was NOT indicated.       Patient did not receive the recommended 30 mL/kg fluid bolus for sepsis because it would be harmful or detrimental to the patient.    The patient has concern for fluid overload.   The patient was ordered 1L of fluids.    Total Critical Care time of 35 minutes. Total critical care time documented does not include time spent on separately billed procedures for services of nurses or physician assistants. I personally saw and examined the patient. I have reviewed all diagnostic interpretations and treatment plans as written. I was present for the key portions of any procedures performed and the inclusive time noted in any critical care statement. Critical care time includes patient management by me, time spent at the patient's bedside,  time to review lab and imaging results, discussing patient care, documentation in the medical record, and time spent with family or caregiver.         Amount and/or Complexity of Data Reviewed  Decide to obtain previous medical records or to obtain history from someone other than the patient: yes         Critical Care Note: Total Critical Care time of 35 minutes. Total critical care time documented does not include time spent on separately billed procedures for services of nurses or physician assistants. I personally saw and examined the patient. I have reviewed all diagnostic interpretations and treatment  plans as written. I was present for the key portions of any procedures performed and the inclusive time noted in any critical care statement. Critical care time includes patient management by me, time spent at the patients bedside,  time to review lab and imaging results, discussing patient care, documentation in the medical record, and time spent with family or caregiver.    Patient Care Considerations:          Consultants/Shared Management Plan:    I discussed patient with Dr. DAMION Lyons who agrees to admission.  Consultant: I have discussed the case with I discussed patient with Dr. Bocanegra of ICU who agrees to consult on the patient.    Social Determinants of Health:    Patient has presented with family members who are responsible, reliable and will ensure follow up care.      Disposition and Care Coordination:    Admit:   Through independent evaluation of the patient's history, physical, and imperical data, the patient meets criteria for observation/admission to the hospital.        Final diagnoses:   Acute on chronic respiratory failure with hypoxia and hypercapnia   Acute pulmonary edema   Pneumonia due to infectious organism, unspecified laterality, unspecified part of lung        ED Disposition       ED Disposition   Decision to Admit    Condition   --    Comment   Level of Care: Critical Care [6]   Diagnosis: Respiratory failure [981900]   Admitting Physician: TAWANNA LYONS [[id number]]   Attending Physician: TAWANNA LYONS [[id number]]   Certification: I Certify That Inpatient Hospital Services Are Medically Necessary For Greater Than 2 Midnights                 This medical record created using voice recognition software.             Ildefonso Hilton MD  10/29/23 0680

## 2023-10-29 NOTE — ED NOTES
Pt comes to the ER tonight for shortness of breath. EMS placed pt on CPAP. EMS states that she was diagnosed with pneumonia 2 days ago.

## 2023-10-29 NOTE — CONSULTS
Nutrition Services    Patient Name: Charlette Rai  YOB: 1935  MRN: [id number]  Admission date: 10/29/2023      CLINICAL NUTRITION ASSESSMENT      Reason for Assessment  Physician consult, EN     H&P:    Past Medical History:   Diagnosis Date    Allergic rhinitis     Anemia     Arthritis     Asthma     USE INHALERS AND NEBULIZERS    Back pain     Bladder disorder     Cancer     LEFT LUNG CANCER 2005 SURGERY AND CHEMO DONE  AND CURRENTLY 4/ 14/22  RIGHT LUNG CANCER HAS ONLY RECEIVED RADIATION THUS FAR    Chronic kidney disease     stage 3    CKD (chronic kidney disease) stage 4, GFR 15-29 ml/min     Condition not found     ulcer    Congestive heart failure (CHF)     FOLLOWED BY DR AQUINO. DENIES CP BUT DOES HAVE SOA CHRONIC ISSUE COPD/LUNG CANCER    COPD (chronic obstructive pulmonary disease)     FOLLOWED BY DR MARIAJOSE BAILEY    Coronary artery disease     DENIES CP BUT DOES GET SOA MOST OF THE TIME WITH EXERTION BUT OCC AT REST CHRONIC ISSUE COPD/LUNG CANCER    Deep vein thrombosis     Diabetes mellitus     DOES NOT CHECK BS DAILY    Disease of thyroid gland     HYPOTHYROIDISM    Essential hypertension     Gastric ulcer     GERD (gastroesophageal reflux disease)     Heart murmur     History of transfusion     NO ISSUES POST TRANSFUSION WAS MANY YEARS AGO    Hyperlipemia     Leukocytopenia     FOLLOWED BY DR MIR BAILEY    Limb swelling     Lumbago     Lumbar spinal stenosis     Lung cancer     Migraine headache     Multiple joint pain     Osteopenia     Reflux esophagitis     Shortness of breath     Thyroid nodule     HAS ULTRASOUND YEARLY BEING MONITORED    Vascular disease     Vitamin D deficiency         Current Problems:   Active Hospital Problems    Diagnosis     **Respiratory failure         Nutrition/Diet History         Narrative     Patient admitted with acute respiratory failure.  Current intubated in CCU.  Hyperglycemia.  Nutrition consult received for tube feeding assessment.          Anthropometrics        Current Height, Weight    Weight: 72.3 kg (159 lb 6.3 oz)   Current BMI Body mass index is 26.52 kg/m².       Weight Hx  Wt Readings from Last 30 Encounters:   10/29/23 0529 72.3 kg (159 lb 6.3 oz)   10/23/23 1500 69.8 kg (153 lb 14.1 oz)   07/07/23 1054 66.7 kg (147 lb)   03/23/23 0802 66 kg (145 lb 8.1 oz)   03/21/23 1315 65.8 kg (145 lb)   12/13/22 1118 66.7 kg (147 lb)   10/07/22 1259 68.5 kg (151 lb)   08/29/22 0832 68 kg (150 lb)   06/27/22 0912 70.3 kg (155 lb)   05/10/22 0558 70.5 kg (155 lb 6.8 oz)   04/14/22 1315 72.5 kg (159 lb 13.3 oz)   04/08/22 2116 72.1 kg (159 lb)   03/08/22 1305 74.4 kg (164 lb)   02/08/22 1252 74.7 kg (164 lb 9.6 oz)   01/10/22 1443 74.7 kg (164 lb 9.6 oz)   12/08/21 1027 73.9 kg (163 lb)   12/06/21 1039 74.8 kg (165 lb)   08/02/21 1104 75.4 kg (166 lb 3.2 oz)   06/23/21 1120 75.7 kg (166 lb 14.2 oz)   06/21/21 1106 74.9 kg (165 lb 2 oz)   06/16/21 1148 74.8 kg (164 lb 14.5 oz)   06/14/21 1057 75.1 kg (165 lb 9.1 oz)   05/13/21 0000 73 kg (161 lb)   05/07/21 0000 75.5 kg (166 lb 6 oz)   04/16/21 0000 76.3 kg (168 lb 4 oz)   03/26/21 0000 75.3 kg (166 lb)   03/08/21 0000 75.3 kg (166 lb)   02/23/21 0000 77.2 kg (170 lb 2 oz)   01/12/21 0000 76.7 kg (169 lb 3 oz)   12/22/20 0000 75.7 kg (167 lb)   12/07/20 0000 75.3 kg (166 lb)            Wt Change Observation stable     Estimated/Assessed Needs       Energy Requirements 12-25 kcal.kg    EST Needs (kcal/day) 868-1808 kcal        Protein Requirements 1.2-2.0 g/kg   EST Daily Needs (g/day)  g       Fluid Requirements 25 ml/kg     Estimated Needs (mL/day) 1808 ml      Labs/Medications         Pertinent Labs Reviewed.   Results from last 7 days   Lab Units 10/29/23  0754 10/29/23  0450 10/29/23  0448   SODIUM mmol/L  --   --  133*   SODIUM, ARTERIAL mmol/L 133.9* 135.9*  --    POTASSIUM mmol/L  --   --  5.4*   CHLORIDE mmol/L  --   --  100   CO2 mmol/L  --   --  21.9*   BUN mg/dL  --   --  78*    CREATININE mg/dL  --   --  2.68*   CALCIUM mg/dL  --   --  8.4*   BILIRUBIN mg/dL  --   --  0.2   ALK PHOS U/L  --   --  310*   ALT (SGPT) U/L  --   --  27   AST (SGOT) U/L  --   --  26   GLUCOSE mg/dL  --   --  572*   GLUCOSE, ARTERIAL mg/dL 509* 555*  --      Results from last 7 days   Lab Units 10/29/23  0448   MAGNESIUM mg/dL 2.2   PHOSPHORUS mg/dL 5.9*   HEMOGLOBIN g/dL 9.5*   HEMATOCRIT % 32.2*     COVID19   Date Value Ref Range Status   10/29/2023 Not Detected Not Detected - Ref. Range Final     Lab Results   Component Value Date    HGBA1C 6.70 (H) 07/26/2023         Pertinent Medications Reviewed.     Current Nutrition Orders & Evaluation of Intake       Oral Nutrition     Current PO Diet NPO Diet NPO Type: Strict NPO   Supplement No active supplement orders       Malnutrition Severity Assessment                Nutrition Diagnosis         Nutrition Dx Problem 1 Inadequate energy Intake related to decreased ability to consume sufficient energy as evidenced by intubated/sedated, NPO.       Nutrition Intervention         Diabetisource AC @ 65 ml/hr x 22 hrs/day  Free water flushes 50 ml every 3 hours   Provides 1716 kcal, 86 g pro, 1573 ml fluid     Medical Nutrition Therapy/Nutrition Education          Learner     Readiness Patient  Education not appropriate at this time     Method     Response N/A  N/A     Monitor/Evaluation        Monitor Per protocol, I&O, Pertinent labs, EN delivery/tolerance, Weight, POC/GOC       Nutrition Discharge Plan         To be determined       Electronically signed by:  Tanvi Wayne RD  10/29/23 09:19 EDT

## 2023-10-29 NOTE — CONSULTS
"Chief Complaint  Abdominal Pain (Pain on right side, nausea  going on since Sunday )    Subjective          Carina MCCOLLUM presents to [facility]  History of Present Illness  Patient in today for evaluation on RUQ pain along with nausea for past 3 days. Denies fever. Denies dysuria . States had one episode of loose stool 3 days ago, none since. States felt more nauseated after eating and had episode of vomiting. States last menstrual cycle one month ago and due to start. States staying nauseated.   Abdominal Pain  This is a new problem. The current episode started in the past 7 days. The pain is located in the RUQ. The quality of the pain is aching and dull. Associated symptoms include diarrhea, nausea and vomiting. Pertinent negatives include no constipation, dysuria, fever or melena. She has tried nothing for the symptoms.     Objective   Vital Signs:   /78 (BP Location: Left arm, Patient Position: Sitting, Cuff Size: Adult)   Pulse 90   Temp 98.7 øF (37.1 øC)   Ht 162.6 cm (64\")   Wt 54.4 kg (120 lb)   SpO2 98%   BMI 20.60 kg/mý     Body mass index is 20.6 kg/mý.    Review of Systems   Constitutional:  Negative for fever.   Respiratory:  Negative for shortness of breath.    Gastrointestinal:  Positive for abdominal pain, diarrhea, nausea and vomiting. Negative for constipation and melena.   Genitourinary:  Negative for dysuria.     Past History:  Medical History: has no past medical history on file.   Surgical History: has a past surgical history that includes Essure tubal ligation (Bilateral, 2012).   Family History: family history includes Breast cancer in her mother; COPD in her mother; Lung cancer in her maternal aunt; No Known Problems in her brother and father.   Social History: reports that she has been smoking cigarettes. She started smoking about 23 years ago. She has been smoking an average of .5 packs per day. She does not have any smokeless tobacco history " Pulmonary / Critical Care Consult Note      Patient Name: Charlette Rai  : 1935  MRN: [id number]  Primary Care Physician:  Clyde Bailey MD  Referring Physician: Rafael Lyons MD  Date of admission: 10/29/2023    Subjective   Subjective     Reason for Consult/ Chief Complaint: Shortness of breath    HPI:  Charlette Rai is a 88 y.o. female with history of COPD, history of lung cancer s/p lobectomy, CKD stage IV, hypertension, hyperlipidemia, diabetes presented to the ED via EMS for shortness of air.  Patient was on NIPPV 50% FiO2 on arrival, tachypneic and tachycardic.  ABG showed pH 6.95/96/85/21 she was placed on mechanical ventilator.  Other laboratory findings showed glucose 575, creatinine 2.68, potassium 5.4, CO2 21, proBNP 10,000, WBC 14,000.  Urinalysis had 2+ bacteria 21-50 WBCs with 7-12 squams.  Chest x-ray showed bilateral airspace disease favorable for pulmonary edema.  She was given IV steroids, 80 mg IV Lasix, and initiated on broad-spectrum antibiotics.    This morning, patient is on propofol at 10, she does wake up and follow commands, moves all 4 extremities, noted to have 2+ pitting edema bilateral lower extremities, she is hypothermic temperature 95.0.  Family is at bedside.    Review of Systems  Unable to obtain due to patient status      Personal History     Past Medical History:   Diagnosis Date    Allergic rhinitis     Anemia     Arthritis     Asthma     USE INHALERS AND NEBULIZERS    Back pain     Bladder disorder     Cancer     LEFT LUNG CANCER  SURGERY AND CHEMO DONE  AND CURRENTLY   RIGHT LUNG CANCER HAS ONLY RECEIVED RADIATION THUS FAR    Chronic kidney disease     stage 3    CKD (chronic kidney disease) stage 4, GFR 15-29 ml/min     Condition not found     ulcer    Congestive heart failure (CHF)     FOLLOWED BY DR AQUINO. DENIES CP BUT DOES HAVE SOA CHRONIC ISSUE COPD/LUNG CANCER    COPD (chronic obstructive pulmonary disease)     FOLLOWED BY DR MARIAJOSE BAILEY    Coronary  artery disease     DENIES CP BUT DOES GET SOA MOST OF THE TIME WITH EXERTION BUT OCC AT REST CHRONIC ISSUE COPD/LUNG CANCER    Deep vein thrombosis     Diabetes mellitus     DOES NOT CHECK BS DAILY    Disease of thyroid gland     HYPOTHYROIDISM    Essential hypertension     Gastric ulcer     GERD (gastroesophageal reflux disease)     Heart murmur     History of transfusion     NO ISSUES POST TRANSFUSION WAS MANY YEARS AGO    Hyperlipemia     Leukocytopenia     FOLLOWED BY DR MIR BAILEY    Limb swelling     Lumbago     Lumbar spinal stenosis     Lung cancer     Migraine headache     Multiple joint pain     Osteopenia     Reflux esophagitis     Shortness of breath     Thyroid nodule     HAS ULTRASOUND YEARLY BEING MONITORED    Vascular disease     Vitamin D deficiency        Past Surgical History:   Procedure Laterality Date    ABDOMINAL SURGERY      APPENDECTOMY      ARTERIOVENOUS FISTULA/SHUNT SURGERY Left 05/10/2022    Procedure: Left basilic vein transposition;  Surgeon: Wan Barksdale MD;  Location: [facility] MAIN OR;  Service: Vascular;  Laterality: Left;    ARTERIOVENOUS FISTULA/SHUNT SURGERY Left 3/23/2023    Procedure: Ligation of left arm arteriovenous fistula;  Surgeon: Wan Barksdale MD;  Location: [facility] MAIN OR;  Service: Vascular;  Laterality: Left;    CARDIAC CATHETERIZATION  1996    CARDIAC SURGERY      CARDIAC SURGERY      fluid drained from heart    CATARACT EXTRACTION, BILATERAL  2003    COLONOSCOPY  2014    ENDOSCOPY  2016    2019    FEMORAL ARTERY STENT Bilateral     HYSTERECTOMY      LUNG BIOPSY Left 2005    lobectomy upper lung caner    LUNG VOLUME REDUCTION      OTHER SURGICAL HISTORY      artifical joints/limbs    REPLACEMENT TOTAL KNEE Left 2016    UPPER GASTROINTESTINAL ENDOSCOPY         Family History: family history includes Arthritis in her father and mother; Cancer in her brother, brother, brother, and brother; Diabetes in her brother; Other in her brother; Prostate cancer in her brother  on file. She reports current alcohol use. She reports that she does not use drugs.    No current outpatient medications on file.  Allergies: Patient has no known allergies.    Physical Exam  Constitutional:       Appearance: Normal appearance.   HENT:      Right Ear: Tympanic membrane normal.      Left Ear: Tympanic membrane normal.      Mouth/Throat:      Pharynx: Oropharynx is clear.   Eyes:      Conjunctiva/sclera: Conjunctivae normal.      Pupils: Pupils are equal, round, and reactive to light.   Cardiovascular:      Rate and Rhythm: Normal rate and regular rhythm.      Heart sounds: Normal heart sounds.   Pulmonary:      Effort: Pulmonary effort is normal.      Breath sounds: Normal breath sounds.   Abdominal:      Palpations: Abdomen is soft.      Tenderness: There is abdominal tenderness in the right upper quadrant. There is no right CVA tenderness, left CVA tenderness, guarding or rebound.      Comments: Mild tenderness to palpate RUQ ; able to shake abdomen without causing pain; no rash noted to area    Neurological:      Mental Status: She is oriented to person, place, and time.   Psychiatric:         Mood and Affect: Mood normal.         Behavior: Behavior normal.           Assessment and Plan   Diagnoses and all orders for this visit:    1. RUQ pain (Primary)  -     CBC & Differential  -     Comprehensive Metabolic Panel  -     Amylase  -     Lipase  -     POC Urinalysis Dipstick, Automated  -     POC Pregnancy, Urine  Urine dip negative except for trace of blood- will send culture- getting ready to start menstrual cycle; urine hcg neg; will check labs today and set up for gallbladder ultrasound. Discussed if pain progresses, would recommend ER evaluation. Zofran to use prn. Encouraged good hydration.            Follow Up   No follow-ups on file.  Patient was given instructions and counseling regarding her condition or for health maintenance advice. Please see specific information pulled into the AVS if  and brother. Otherwise pertinent FHx was reviewed and not pertinent to current issue.    Social History:  reports that she quit smoking about 19 years ago. Her smoking use included cigarettes. She started smoking about 71 years ago. She has a 30.00 pack-year smoking history. She has never used smokeless tobacco. She reports that she does not drink alcohol and does not use drugs.    Home Medications:  albuterol sulfate HFA, amLODIPine, aspirin, atorvastatin, budesonide-formoterol, cilostazol, dexlansoprazole, doxycycline, ergocalciferol, furosemide, levocetirizine, linagliptin, nebivolol, nitroglycerin, polycarbophil, predniSONE, rosuvastatin, and sevelamer    Allergies:  No Known Allergies    Objective    Objective     Vitals:   Temp:  [97 °F (36.1 °C)-97.5 °F (36.4 °C)] 97.5 °F (36.4 °C)  Heart Rate:  [] 90  Resp:  [28-30] 28  BP: (123-153)/(58-75) 142/58  FiO2 (%):  [70 %] 70 %    Physical Exam:  Vital Signs Reviewed   General:  NAD.  Chronically ill-appearing female, lying in bed.  Family members at bedside.  HEENT:  PERRL, EOMI.    Neck:  No JVD, no thyromegaly  Lymph: no axillary, cervical, supraclavicular lymphadenopathy noted bilaterally  Chest:  Clear to auscultation bilaterally, no work of breathing noted on mechanical ventilator  CV: RRR, no M/G/R, pulses 2+  Abd:  Soft, NT, ND, +BS  EXT:  no clubbing, no cyanosis, no edema  Neuro: Sedated, intermittently following commands, CN grossly intact, no focal deficits.  Skin: No rashes or lesions noted    Result Review    Result Review:  I have personally reviewed the results from the time of this admission to 10/29/2023 10:13 EDT and agree with these findings:  [x]  Laboratory  [x]  Microbiology  [x]  Radiology  []  EKG/Telemetry   []  Cardiology/Vascular   []  Pathology  [x]  Old records  []  Other:  Most notable findings include:       Lab 10/29/23  0754 10/29/23  0450 10/29/23  0448   WBC  --   --  14.71*   HEMOGLOBIN  --   --  9.5*   HEMATOCRIT  --    appropriate.     Ambar Ornelas PA-C   --  32.2*   PLATELETS  --   --  307   SODIUM  --   --  133*   SODIUM, ARTERIAL 133.9* 135.9*  --    POTASSIUM  --   --  5.4*   CHLORIDE  --   --  100   CO2  --   --  21.9*   BUN  --   --  78*   CREATININE  --   --  2.68*   GLUCOSE  --   --  572*   GLUCOSE, ARTERIAL 509* 555*  --    CALCIUM  --   --  8.4*   PHOSPHORUS  --   --  5.9*   TOTAL PROTEIN  --   --  7.5   ALBUMIN  --   --  4.2   GLOBULIN  --   --  3.3     XR Chest 1 View    Result Date: 10/29/2023    1. Interval placement of endotracheal tube and orogastric tubes which are in good position. 2. Increasing bilateral airspace disease favored to be secondary to pulmonary edema 3. Small left pleural effusion       OMAIRA GIRON MD       Electronically Signed and Approved By: OMAIRA GIRON MD on 10/29/2023 at 5:30                Assessment & Plan   Assessment / Plan     Active Hospital Problems:  Active Hospital Problems    Diagnosis     **Respiratory failure      Impression:  Acute hypoxic hypercarbic respiratory failure requiring mechanical vent  COPD with acute exacerbation  History of lung cancer s/p lobectomy  Type 2 diabetes with hyperglycemia  Urinary tract infection  Elevated proBNP  Bilateral lower extremity edema  Anemia of CKD  CKD stage IV  Hyperkalemia  Hypertension  Leukocytosis    Plan:  -Continue mechanical ventilator ACVC/26/450/5 +, wean FiO2 as tolerated  -We will order daily SAT/SBT  -Continue propofol at fentanyl for sedation/pain  -Repeat ABG this morning, reviewed with some improvement  -We will add nebulizers  -Send sputum culture, blood culture in process, add urine culture to specimen in lab  -Check respiratory viral panel, Pro-Hank negative  -Continue broad-spectrum antibiotics cefepime and vancomycin, check MRSA PCR  -Continue Solu-Medrol 40 mg every 8 hours  -Start insulin drip  -NG tube in place, order tube feeds per dietitian, would recommend low potassium  -Order Lokelma 10x3 doses  -Nephrology consulted per primary service    DVT  PPx: Heparin subcu  PCP PPx: Pepcid    DVT prophylaxis:  Medical DVT prophylaxis orders are present.     Code Status and Medical Interventions:   Ordered at: 10/29/23 0621     Code Status (Patient has no pulse and is not breathing):    CPR (Attempt to Resuscitate)     Medical Interventions (Patient has pulse or is breathing):    Full Support      Jazmin CASTILLO APRN, spent 15 minutes critical care time in accordance to split shared billing.  Electronically signed by DAT Burrell, 10/29/23, 10:13 AM EDT.    Patient is critically ill with respiratory failure requiring mechanical ventilator, hypothermia, urinary tract infection, lactic acidosis. 37 minutes of critical care time was spent managing this patient, excluding procedures. Of this time, IDr. Rosalinda, spent 22 minutes and Jazmin Bender NP spent 15 minutes in accordance with split shared billing. This included personally reviewing all pertinent labs, imaging, microbiology and documentation. Also discussing the case with the patient and any available family, the admitting physician and any available ancillary staff.   Electronically signed by Rosalinda Bocanegra MD, 10/29/23, 12:31 PM EDT.

## 2023-10-29 NOTE — H&P
[facility]   HISTORY AND PHYSICAL    Patient Name: Charlette Rai  : 1935  MRN: [id number]  Primary Care Physician:  Clyde Bailey MD  Date of admission: 10/29/2023    Subjective   Subjective     Chief Complaint:   Shortness of breath      HPI:    Charlette Rai is a 88 y.o. female brought into ER by EMS for shortness of breath.  Patient was having trouble breathing for several days progressively got worse this morning.  Work-up in the ER revealed respiratory acidosis.  Patient intubated for worsening respiratory status.  Patient also found to have possible pneumonia and CHF.  Also noted sugars were high.  When I saw patient this morning in ICU patient is awake alert, intubated trying to respond to questions by writing.  Daughter and  at bedside.  There is no report of fever or chills.  Repeat ABGs has shown some improvement in pH.        Review of Systems:    No report of fever chills.  Shortness of breath.  Cough.  Wheezing.  No nausea vomiting or diarrhea.      Personal History     Past Medical History:   Diagnosis Date    Allergic rhinitis     Anemia     Arthritis     Asthma     USE INHALERS AND NEBULIZERS    Back pain     Bladder disorder     Cancer     LEFT LUNG CANCER  SURGERY AND CHEMO DONE  AND CURRENTLY   RIGHT LUNG CANCER HAS ONLY RECEIVED RADIATION THUS FAR    Chronic kidney disease     stage 3    CKD (chronic kidney disease) stage 4, GFR 15-29 ml/min     Condition not found     ulcer    Congestive heart failure (CHF)     FOLLOWED BY DR AQUINO. DENIES CP BUT DOES HAVE SOA CHRONIC ISSUE COPD/LUNG CANCER    COPD (chronic obstructive pulmonary disease)     FOLLOWED BY DR MARIAJOSE BAILEY    Coronary artery disease     DENIES CP BUT DOES GET SOA MOST OF THE TIME WITH EXERTION BUT OCC AT REST CHRONIC ISSUE COPD/LUNG CANCER    Deep vein thrombosis     Diabetes mellitus     DOES NOT CHECK BS DAILY    Disease of thyroid gland     HYPOTHYROIDISM    Essential hypertension     Gastric ulcer      GERD (gastroesophageal reflux disease)     Heart murmur     History of transfusion     NO ISSUES POST TRANSFUSION WAS MANY YEARS AGO    Hyperlipemia     Leukocytopenia     FOLLOWED BY DR MIR BAILEY    Limb swelling     Lumbago     Lumbar spinal stenosis     Lung cancer     Migraine headache     Multiple joint pain     Osteopenia     Reflux esophagitis     Shortness of breath     Thyroid nodule     HAS ULTRASOUND YEARLY BEING MONITORED    Vascular disease     Vitamin D deficiency        Past Surgical History:   Procedure Laterality Date    ABDOMINAL SURGERY      APPENDECTOMY      ARTERIOVENOUS FISTULA/SHUNT SURGERY Left 05/10/2022    Procedure: Left basilic vein transposition;  Surgeon: Wan Barksdale MD;  Location: [facility] MAIN OR;  Service: Vascular;  Laterality: Left;    ARTERIOVENOUS FISTULA/SHUNT SURGERY Left 3/23/2023    Procedure: Ligation of left arm arteriovenous fistula;  Surgeon: Wan Barksdale MD;  Location: [facility] MAIN OR;  Service: Vascular;  Laterality: Left;    CARDIAC CATHETERIZATION  1996    CARDIAC SURGERY      CARDIAC SURGERY      fluid drained from heart    CATARACT EXTRACTION, BILATERAL  2003    COLONOSCOPY  2014    ENDOSCOPY  2016 2019    FEMORAL ARTERY STENT Bilateral     HYSTERECTOMY      LUNG BIOPSY Left 2005    lobectomy upper lung caner    LUNG VOLUME REDUCTION      OTHER SURGICAL HISTORY      artifical joints/limbs    REPLACEMENT TOTAL KNEE Left 2016    UPPER GASTROINTESTINAL ENDOSCOPY         Family History: family history includes Arthritis in her father and mother; Cancer in her brother, brother, brother, and brother; Diabetes in her brother; Other in her brother; Prostate cancer in her brother and brother. Otherwise pertinent FHx was reviewed and not pertinent to current issue.    Social History:  reports that she quit smoking about 19 years ago. Her smoking use included cigarettes. She started smoking about 71 years ago. She has a 30.00 pack-year smoking history. She has never  used smokeless tobacco. She reports that she does not drink alcohol and does not use drugs.    Home Medications:  albuterol sulfate HFA, amLODIPine, aspirin, atorvastatin, budesonide-formoterol, cilostazol, dexlansoprazole, doxycycline, ergocalciferol, furosemide, levocetirizine, linagliptin, nebivolol, nitroglycerin, polycarbophil, predniSONE, rosuvastatin, and sevelamer      Allergies:  No Known Allergies    Objective   Objective     Vitals:   Temp:  [96.8 °F (36 °C)-97.5 °F (36.4 °C)] 96.8 °F (36 °C)  Heart Rate:  [] 83  Resp:  [26-30] 26  BP: ()/(38-94) 95/38  Flow (L/min):  [70] 70  FiO2 (%):  [50 %-70 %] 50 %    Physical Exam    Elderly female, orally intubated awake alert, appears calmer.    HEENT with oral intubation pupils reactive external ocular muscle intact.  Neck supple no JVD.  Trachea central.  Heart regular with grade 2 systolic murmur.  Lungs diminished breath sounds with crackles bilaterally.  Abdomen is soft and obese.  Extremities trace of edema.      I have personally reviewed the results from the time of this admission to 10/29/2023 12:33 EDT and agree with these findings:  [x]  Laboratory  []  Microbiology  [x]  Radiology  []  EKG/Telemetry   []  Cardiology/Vascular   []  Pathology  []  Old records  []  Other:    CBC:    WBC   Date Value Ref Range Status   10/29/2023 14.71 (H) 3.40 - 10.80 10*3/mm3 Final     RBC   Date Value Ref Range Status   10/29/2023 3.20 (L) 3.77 - 5.28 10*6/mm3 Final     Hemoglobin   Date Value Ref Range Status   10/29/2023 9.5 (L) 12.0 - 15.9 g/dL Final     Hematocrit   Date Value Ref Range Status   10/29/2023 32.2 (L) 34.0 - 46.6 % Final     MCV   Date Value Ref Range Status   10/29/2023 100.6 (H) 79.0 - 97.0 fL Final     MCH   Date Value Ref Range Status   10/29/2023 29.7 26.6 - 33.0 pg Final     MCHC   Date Value Ref Range Status   10/29/2023 29.5 (L) 31.5 - 35.7 g/dL Final     RDW   Date Value Ref Range Status   10/29/2023 12.2 (L) 12.3 - 15.4 % Final      RDW-SD   Date Value Ref Range Status   10/29/2023 45.1 37.0 - 54.0 fl Final     MPV   Date Value Ref Range Status   10/29/2023 10.2 6.0 - 12.0 fL Final     Platelets   Date Value Ref Range Status   10/29/2023 307 140 - 450 10*3/mm3 Final     Neutrophil %   Date Value Ref Range Status   10/29/2023 67.5 42.7 - 76.0 % Final     Lymphocyte %   Date Value Ref Range Status   10/29/2023 18.2 (L) 19.6 - 45.3 % Final     Monocyte %   Date Value Ref Range Status   10/29/2023 13.1 (H) 5.0 - 12.0 % Final     Eosinophil %   Date Value Ref Range Status   10/29/2023 0.1 (L) 0.3 - 6.2 % Final     Basophil %   Date Value Ref Range Status   10/29/2023 0.2 0.0 - 1.5 % Final     Immature Grans %   Date Value Ref Range Status   10/29/2023 0.9 (H) 0.0 - 0.5 % Final     Neutrophils, Absolute   Date Value Ref Range Status   10/29/2023 9.95 (H) 1.70 - 7.00 10*3/mm3 Final     Lymphocytes, Absolute   Date Value Ref Range Status   10/29/2023 2.67 0.70 - 3.10 10*3/mm3 Final     Monocytes, Absolute   Date Value Ref Range Status   10/29/2023 1.92 (H) 0.10 - 0.90 10*3/mm3 Final     Eosinophils, Absolute   Date Value Ref Range Status   10/29/2023 0.01 0.00 - 0.40 10*3/mm3 Final     Basophils, Absolute   Date Value Ref Range Status   10/29/2023 0.03 0.00 - 0.20 10*3/mm3 Final     Immature Grans, Absolute   Date Value Ref Range Status   10/29/2023 0.13 (H) 0.00 - 0.05 10*3/mm3 Final     nRBC   Date Value Ref Range Status   10/29/2023 0.0 0.0 - 0.2 /100 WBC Final        BMP:    Lab Results   Component Value Date    GLUCOSE 509 (C) 10/29/2023    BUN 78 (H) 10/29/2023    CREATININE 2.68 (H) 10/29/2023    EGFRIFNONA 17 (L) 01/10/2022    BCR 29.1 (H) 10/29/2023    K 5.4 (H) 10/29/2023    CO2 21.9 (L) 10/29/2023    CALCIUM 8.4 (L) 10/29/2023    ALBUMIN 4.2 10/29/2023    LABIL2 1.5 05/05/2021    AST 26 10/29/2023    ALT 27 10/29/2023        XR Chest 1 View    Result Date: 10/29/2023    1. Interval placement of endotracheal tube and orogastric tubes  which are in good position. 2. Increasing bilateral airspace disease favored to be secondary to pulmonary edema 3. Small left pleural effusion       OMAIRA GIRON MD       Electronically Signed and Approved By: OMAIRA GIRON MD on 10/29/2023 at 5:30                     Assessment & Plan   Assessment / Plan       Current Diagnosis:  Active Hospital Problems    Diagnosis     **Acute respiratory failure with hypoxia and hypercapnia     Respiratory failure     Bilateral pneumonia     COPD with acute exacerbation     Pulmonary edema     Hyperkalemia     Type 2 diabetes mellitus without complication     Stage 4 chronic kidney disease      Plan:   Patient acutely decompensated, acute respiratory failure combination of COPD, pneumonia and pulmonary edema.  Started on IV antibiotics, IV steroids, nebulized treatment, ventilator support.  Check an echo.  Pulmonary consult to assist in ICU management and respiratory status.  Mild hyperkalemia with underlying renal failure.  Kidney function slightly worse.  Will consult nephrologist.  Known history of diabetes poorly controlled, will monitor sugars and use sliding scale  DVT and GI prophylaxis per protocol  Discussed with RN at bedside.  Intensive team consulted and discussed.  Further management be based on clinical course, lab results and consultant input      DVT prophylaxis:  Medical DVT prophylaxis orders are present.    GI Prophylaxis:       IV Pepcid    CODE STATUS:    Code Status (Patient has no pulse and is not breathing): CPR (Attempt to Resuscitate)  Medical Interventions (Patient has pulse or is breathing): Full Support    Admission Status:  I believe this patient meets inpatient status.             I have dictated this note utilizing Dragon Dictation.             Please note that portions of this note were completed with a voice recognition program.             Part of this note may be an electronic transcription/translation of spoken language to printed text          using the Dragon Dictation System.       Electronically signed by Rafael Lyons MD, 10/29/23, 12:23 PM EDT.

## 2023-10-29 NOTE — CONSULTS
Referring Provider: Sahra  Reason for Consultation: renal issues    Patient Care Team:  Clyde Bailey MD as PCP - General (Hematology and Oncology)  Regis Mckeon MD as Consulting Physician (Radiation Oncology)  Carlton Casillas MD as Consulting Physician (Gastroenterology)  Susan Marcos APRN as Nurse Practitioner (Gastroenterology)    Chief complaint     Subjective .     History of present illness:  Chart reviewed.  All history obtained from chart and nurses. Patient on vent.   a poor historian.    Admitted through ER for SOA, in resp distress and intubated.  Has know CKD, followed by Dr García, labs in recent range on admit.  Got Lasix IV for pulm edema on admit      Review of Systems    unable      Past Medical History:   Diagnosis Date    Allergic rhinitis     Anemia     Arthritis     Asthma     USE INHALERS AND NEBULIZERS    Back pain     Bladder disorder     Cancer     LEFT LUNG CANCER 2005 SURGERY AND CHEMO DONE  AND CURRENTLY 4/ 14/22  RIGHT LUNG CANCER HAS ONLY RECEIVED RADIATION THUS FAR    Chronic kidney disease     stage 3    CKD (chronic kidney disease) stage 4, GFR 15-29 ml/min     Condition not found     ulcer    Congestive heart failure (CHF)     FOLLOWED BY DR AQUINO. DENIES CP BUT DOES HAVE SOA CHRONIC ISSUE COPD/LUNG CANCER    COPD (chronic obstructive pulmonary disease)     FOLLOWED BY DR MARIAJOSE BAILEY    Coronary artery disease     DENIES CP BUT DOES GET SOA MOST OF THE TIME WITH EXERTION BUT OCC AT REST CHRONIC ISSUE COPD/LUNG CANCER    Deep vein thrombosis     Diabetes mellitus     DOES NOT CHECK BS DAILY    Disease of thyroid gland     HYPOTHYROIDISM    Essential hypertension     Gastric ulcer     GERD (gastroesophageal reflux disease)     Heart murmur     History of transfusion     NO ISSUES POST TRANSFUSION WAS MANY YEARS AGO    Hyperlipemia     Leukocytopenia     FOLLOWED BY DR MIR BAILEY    Limb swelling     Lumbago     Lumbar spinal stenosis     Lung cancer      Migraine headache     Multiple joint pain     Osteopenia     Reflux esophagitis     Shortness of breath     Thyroid nodule     HAS ULTRASOUND YEARLY BEING MONITORED    Vascular disease     Vitamin D deficiency      Past Surgical History:   Procedure Laterality Date    ABDOMINAL SURGERY      APPENDECTOMY      ARTERIOVENOUS FISTULA/SHUNT SURGERY Left 05/10/2022    Procedure: Left basilic vein transposition;  Surgeon: Wan Barksdale MD;  Location: [facility] MAIN OR;  Service: Vascular;  Laterality: Left;    ARTERIOVENOUS FISTULA/SHUNT SURGERY Left 3/23/2023    Procedure: Ligation of left arm arteriovenous fistula;  Surgeon: Wan Barksdale MD;  Location: [facility] MAIN OR;  Service: Vascular;  Laterality: Left;    CARDIAC CATHETERIZATION      CARDIAC SURGERY      CARDIAC SURGERY      fluid drained from heart    CATARACT EXTRACTION, BILATERAL      COLONOSCOPY      ENDOSCOPY  2016    FEMORAL ARTERY STENT Bilateral     HYSTERECTOMY      LUNG BIOPSY Left     lobectomy upper lung caner    LUNG VOLUME REDUCTION      OTHER SURGICAL HISTORY      artifical joints/limbs    REPLACEMENT TOTAL KNEE Left     UPPER GASTROINTESTINAL ENDOSCOPY       Family History   Problem Relation Age of Onset    Arthritis Mother     Arthritis Father     Cancer Brother     Diabetes Brother     Other Brother         blood disease     Prostate cancer Brother     Cancer Brother     Prostate cancer Brother     Cancer Brother     Cancer Brother     Malig Hyperthermia Neg Hx     Colon cancer Neg Hx      Social History     Tobacco Use    Smoking status: Former     Packs/day: 1.00     Years: 30.00     Additional pack years: 0.00     Total pack years: 30.00     Types: Cigarettes     Start date:      Quit date: 2004     Years since quittin.3    Smokeless tobacco: Never   Vaping Use    Vaping Use: Never used   Substance Use Topics    Alcohol use: Never    Drug use: Never     Medications Prior to Admission   Medication Sig  Dispense Refill Last Dose    albuterol sulfate  (90 Base) MCG/ACT inhaler Inhale 2 puffs Every 4 (Four) Hours As Needed for Wheezing.       amLODIPine (NORVASC) 10 MG tablet Take 1 tablet by mouth Daily. 90 tablet 3     aspirin 81 MG chewable tablet Chew 1 tablet Daily.       cilostazol (PLETAL) 100 MG tablet Take 1 tablet by mouth 2 (two) times a day.       dexlansoprazole (DEXILANT) 60 MG capsule Take 1 capsule by mouth Daily.       doxycycline (VIBRAMYCIN) 100 MG capsule Take 1 capsule by mouth Every 12 (Twelve) Hours.       ergocalciferol (ERGOCALCIFEROL) 1.25 MG (77986 UT) capsule Take 1 capsule by mouth Every 14 (Fourteen) Days.       furosemide (LASIX) 40 MG tablet Take 2 tablets by mouth Daily (Monday-Friday).       furosemide (LASIX) 40 MG tablet Take 1 tablet by mouth 2 (Two) Times a Week. Saturday and Sunday       levocetirizine (XYZAL) 5 MG tablet TAKE 1 TABLET DAILY 90 tablet 1     linagliptin (Tradjenta) 5 MG tablet tablet Take 1 tablet by mouth Daily. 90 tablet 1     nebivolol (Bystolic) 10 MG tablet Take 1 tablet by mouth Daily. 90 tablet 3     nitroglycerin (NITROSTAT) 0.4 MG SL tablet Place 1 tablet under the tongue Every 5 (Five) Minutes As Needed for Chest Pain.       predniSONE (DELTASONE) 20 MG tablet Take 2 tablets by mouth Daily.       rosuvastatin (CRESTOR) 20 MG tablet Take 1 tablet by mouth Daily. 90 tablet 3     sevelamer (RENVELA) 800 MG tablet Take 1 tablet by mouth 3 (Three) Times a Day With Meals.       Symbicort 160-4.5 MCG/ACT inhaler Inhale 2 puffs 2 (two) times a day.       atorvastatin (LIPITOR) 40 MG tablet Take 1 tablet by mouth Every Night.       polycarbophil (calcium polycarbophil) 625 MG tablet tablet Take 1 tablet by mouth Daily.        arformoterol, 15 mcg, Nebulization, BID - RT  budesonide, 0.5 mg, Nebulization, BID - RT  [START ON 10/30/2023] cefepime, 2,000 mg, Intravenous, Q24H  famotidine, 20 mg, Intravenous, Daily  furosemide, 40 mg, Intravenous,  Q8H  heparin (porcine), 5,000 Units, Subcutaneous, Q8H  ipratropium-albuterol, 3 mL, Nebulization, Q4H - RT  methylPREDNISolone sodium succinate, 40 mg, Intravenous, Q8H  senna-docusate sodium, 1 tablet, Per G Tube, BID  sodium chloride, 10 mL, Intravenous, Q12H  sodium zirconium cyclosilicate, 10 g, Nasogastric, Once      fentanyl 10 mcg/mL,  mcg/hr, Last Rate: 50 mcg/hr (10/29/23 1245)  insulin, 0-100 Units/hr, Last Rate: 9.8 Units/hr (10/29/23 0958)  Pharmacy to Dose Cefepime,   Pharmacy to dose vancomycin,   propofol, 5-50 mcg/kg/min, Last Rate: 25 mcg/kg/min (10/29/23 1252)      Allergies:   Patient has no known allergies.    Objective     Vital Signs   Temp:  [96.8 °F (36 °C)-97.5 °F (36.4 °C)] 96.8 °F (36 °C)  Heart Rate:  [] 83  Resp:  [26-30] 26  BP: ()/(38-94) 95/38  FiO2 (%):  [50 %-70 %] 50 %      Intake/Output Summary (Last 24 hours) at 10/29/2023 1303  Last data filed at 10/29/2023 1000  Gross per 24 hour   Intake 220 ml   Output --   Net 220 ml       Physical Exam:      General Appearance:    Vent, agitated   Head:    Normocephalic, without obvious abnormality, atraumatic   Eyes:          No drainage, no icterus, no pallor   Throat:   intubated   Neck:   No adenopathy,  no thyromegaly,  no JVD   Musc:     No CVA tenderness to palpation, no joint effusions   Lungs:     no wheezes but + B rhonchi ,  unlabored on vent, 30% FiO2    Heart:    Regular rhythm and normal rate, normal S1 and S2, no   murmur, no gallop, no rub   Chest Wall:    No chest wall tenderness to palpation   Abdomen:     Normal bowel sounds, no masses,  soft non-tender, non-distended, no guarding, no rebound    Rectal:     Deferred   Extremities:   +Lower extremity edema, no cyanosis   Pulses:   Radial Pulses palpable   Skin:   Dry, No rashes   Lymph nodes:   No palpable adenopathy in neck    Neurologic:   sedated          Results Review:       Results from last 7 days   Lab Units 10/29/23  0754 10/29/23  0459  "10/29/23  0450 10/29/23  0448   SODIUM mmol/L  --   --   --  133*   SODIUM, ARTERIAL mmol/L 133.9*  --  135.9*  --    POTASSIUM mmol/L  --   --   --  5.4*   CHLORIDE mmol/L  --   --   --  100   CO2 mmol/L  --   --   --  21.9*   BUN mg/dL  --   --   --  78*   CREATININE mg/dL  --   --   --  2.68*   GLUCOSE mg/dL  --   --   --  572*   GLUCOSE, ARTERIAL mg/dL 509*   < > 555*  --    CALCIUM mg/dL  --   --   --  8.4*    < > = values in this interval not displayed.     Results from last 7 days   Lab Units 10/29/23  0448   WBC 10*3/mm3 14.71*   HEMOGLOBIN g/dL 9.5*   HEMATOCRIT % 32.2*   PLATELETS 10*3/mm3 307     Magnesium   Date Value Ref Range Status   10/29/2023 2.2 1.6 - 2.4 mg/dL Final     Phosphorus   Date Value Ref Range Status   10/29/2023 5.9 (H) 2.5 - 4.5 mg/dL Final     No results found for: \"BNP\"  Lab Results   Component Value Date    ALBUMIN 4.2 10/29/2023      Brief Urine Lab Results  (Last result in the past 365 days)        Color   Clarity   Blood   Leuk Est   Nitrite   Protein   CREAT   Urine HCG        10/29/23 0522 Yellow   Cloudy   Small (1+)   Small (1+)   Negative   100 mg/dL (2+)                    XR Chest 1 View    Result Date: 10/29/2023  PROCEDURE: XR CHEST 1 VW  COMPARISON: [facility], CR, XR CHEST 2 VW, 1/11/2023, 12:43.  [facility], CT, CT CHEST WO CONTRAST DIAGNOSTIC, 3/01/2023, 15:47.  INDICATIONS: SOA. INTUBATION. OG TUBE  FINDINGS:  There has been interval placement of endotracheal tube with the tip 4 cm above the rebecca.  Orogastric tube is in place the tip projecting over the fundus of the stomach.  Patient is rotated to left.  Heart size is within normal limits.  There is increasing airspace disease within both lungs which may be secondary to pulmonary edema.  There is a small left pleural effusion.  There is chronic left apical parenchymal scarring.  There is linear parenchymal scarring within the right upper lobe.  No definite right pleural " effusion.  No evidence pneumothorax.      Impression:   1. Interval placement of endotracheal tube and orogastric tubes which are in good position. 2. Increasing bilateral airspace disease favored to be secondary to pulmonary edema 3. Small left pleural effusion       OMAIRA GIRON MD       Electronically Signed and Approved By: OMAIRA GIRON MD on 10/29/2023 at 5:30                  Assessment & Plan       Acute respiratory failure with hypoxia and hypercapnia    Stage 4 chronic kidney disease    Type 2 diabetes mellitus without complication    Respiratory failure    Bilateral pneumonia    COPD with acute exacerbation    Pulmonary edema    Hyperkalemia    CKD4-  creatinine has been 2.0 to 2.6 in office with Dr García, so at baseline on admit.  Explained to  that she may get worse with needed diuresis and dialysis may be needed. She had a previous fistula in L arm that was tied off due to steal syndrome  2. Hyperkalemia- likely from high sugars. Will redose Lokelma and dose lasix IV  3. HTN- with H/o B JONAH-- ok off meds with sedation  4. High Po4- hold binder while on vent  5. H/O proteinuria  6. ? UTI- F/U cultures, on Cefepime  7. H/O lung cancer with radiation  8. Dm with high sugars- on insulin drip  9. Pulm edema versus PNA/COPD-  Echo in 2019 with normal EF.  Recheck ordered. Dose Lasix IV q8 h    Plan    Diurese with lasix and initiate dialysis if needed    Prognosis is guarded at 87 y/o      Thank you for consult.   Please call [phone] for any questions.

## 2023-10-30 LAB
ALBUMIN SERPL-MCNC: 3 G/DL (ref 3.5–5.2)
ALBUMIN/GLOB SERPL: 1.2 G/DL
ALP SERPL-CCNC: 193 U/L (ref 39–117)
ALT SERPL W P-5'-P-CCNC: 18 U/L (ref 1–33)
ANION GAP SERPL CALCULATED.3IONS-SCNC: 13.4 MMOL/L (ref 5–15)
ARTERIAL PATENCY WRIST A: POSITIVE
AST SERPL-CCNC: 18 U/L (ref 1–32)
BACTERIA UR QL AUTO: ABNORMAL /HPF
BASE EXCESS BLDA CALC-SCNC: -5.5 MMOL/L (ref -2–2)
BASOPHILS # BLD AUTO: 0.01 10*3/MM3 (ref 0–0.2)
BASOPHILS NFR BLD AUTO: 0.1 % (ref 0–1.5)
BDY SITE: ABNORMAL
BILIRUB SERPL-MCNC: 0.2 MG/DL (ref 0–1.2)
BILIRUB UR QL STRIP: NEGATIVE
BUN SERPL-MCNC: 78 MG/DL (ref 8–23)
BUN/CREAT SERPL: 31.5 (ref 7–25)
CA-I BLDA-SCNC: 1.16 MMOL/L (ref 1.13–1.32)
CALCIUM SPEC-SCNC: 8.2 MG/DL (ref 8.6–10.5)
CHLORIDE BLDA-SCNC: 107 MMOL/L (ref 98–106)
CHLORIDE SERPL-SCNC: 107 MMOL/L (ref 98–107)
CLARITY UR: CLEAR
CO2 SERPL-SCNC: 17.6 MMOL/L (ref 22–29)
COHGB MFR BLD: 0.3 % (ref 0–1.5)
COLOR UR: YELLOW
CREAT SERPL-MCNC: 2.48 MG/DL (ref 0.57–1)
DEPRECATED RDW RBC AUTO: 43.4 FL (ref 37–54)
EGFRCR SERPLBLD CKD-EPI 2021: 18.5 ML/MIN/1.73
EOSINOPHIL # BLD AUTO: 0 10*3/MM3 (ref 0–0.4)
EOSINOPHIL NFR BLD AUTO: 0 % (ref 0.3–6.2)
ERYTHROCYTE [DISTWIDTH] IN BLOOD BY AUTOMATED COUNT: 12.7 % (ref 12.3–15.4)
FHHB: 5.5 % (ref 0–5)
GLOBULIN UR ELPH-MCNC: 2.6 GM/DL
GLUCOSE BLDA-MCNC: 193 MG/DL (ref 65–99)
GLUCOSE BLDC GLUCOMTR-MCNC: 144 MG/DL (ref 70–99)
GLUCOSE BLDC GLUCOMTR-MCNC: 148 MG/DL (ref 70–99)
GLUCOSE BLDC GLUCOMTR-MCNC: 150 MG/DL (ref 70–99)
GLUCOSE BLDC GLUCOMTR-MCNC: 164 MG/DL (ref 70–99)
GLUCOSE BLDC GLUCOMTR-MCNC: 171 MG/DL (ref 70–99)
GLUCOSE BLDC GLUCOMTR-MCNC: 172 MG/DL (ref 70–99)
GLUCOSE BLDC GLUCOMTR-MCNC: 174 MG/DL (ref 70–99)
GLUCOSE BLDC GLUCOMTR-MCNC: 178 MG/DL (ref 70–99)
GLUCOSE BLDC GLUCOMTR-MCNC: 182 MG/DL (ref 70–99)
GLUCOSE BLDC GLUCOMTR-MCNC: 183 MG/DL (ref 70–99)
GLUCOSE BLDC GLUCOMTR-MCNC: 187 MG/DL (ref 70–99)
GLUCOSE BLDC GLUCOMTR-MCNC: 195 MG/DL (ref 70–99)
GLUCOSE BLDC GLUCOMTR-MCNC: 205 MG/DL (ref 70–99)
GLUCOSE BLDC GLUCOMTR-MCNC: 78 MG/DL (ref 70–99)
GLUCOSE SERPL-MCNC: 198 MG/DL (ref 65–99)
GLUCOSE UR STRIP-MCNC: NEGATIVE MG/DL
HBA1C MFR BLD: 7.8 % (ref 4.8–5.6)
HCO3 BLDA-SCNC: 20 MMOL/L (ref 22–26)
HCT VFR BLD AUTO: 25.6 % (ref 34–46.6)
HGB BLD-MCNC: 7.9 G/DL (ref 12–15.9)
HGB BLDA-MCNC: 10.1 G/DL (ref 11.7–14.6)
HGB UR QL STRIP.AUTO: ABNORMAL
HYALINE CASTS UR QL AUTO: ABNORMAL /LPF
IMM GRANULOCYTES # BLD AUTO: 0.06 10*3/MM3 (ref 0–0.05)
IMM GRANULOCYTES NFR BLD AUTO: 0.6 % (ref 0–0.5)
INHALED O2 CONCENTRATION: 30 %
KETONES UR QL STRIP: NEGATIVE
LACTATE BLDA-SCNC: 1.79 MMOL/L (ref 0.5–2)
LEUKOCYTE ESTERASE UR QL STRIP.AUTO: ABNORMAL
LYMPHOCYTES # BLD AUTO: 0.28 10*3/MM3 (ref 0.7–3.1)
LYMPHOCYTES NFR BLD AUTO: 3 % (ref 19.6–45.3)
MAGNESIUM SERPL-MCNC: 1.9 MG/DL (ref 1.6–2.4)
MCH RBC QN AUTO: 29.4 PG (ref 26.6–33)
MCHC RBC AUTO-ENTMCNC: 30.9 G/DL (ref 31.5–35.7)
MCV RBC AUTO: 95.2 FL (ref 79–97)
METHGB BLD QL: 0.1 % (ref 0–1.5)
MODALITY: ABNORMAL
MONOCYTES # BLD AUTO: 0.31 10*3/MM3 (ref 0.1–0.9)
MONOCYTES NFR BLD AUTO: 3.3 % (ref 5–12)
NEUTROPHILS NFR BLD AUTO: 8.73 10*3/MM3 (ref 1.7–7)
NEUTROPHILS NFR BLD AUTO: 93 % (ref 42.7–76)
NITRITE UR QL STRIP: NEGATIVE
NOTE: ABNORMAL
NRBC BLD AUTO-RTO: 0 /100 WBC (ref 0–0.2)
OXYHGB MFR BLDV: 94.1 % (ref 94–99)
PCO2 BLDA: 39.2 MM HG (ref 35–45)
PH BLDA: 7.33 PH UNITS (ref 7.35–7.45)
PH UR STRIP.AUTO: 5.5 [PH] (ref 5–8)
PLATELET # BLD AUTO: 209 10*3/MM3 (ref 140–450)
PMV BLD AUTO: 10.3 FL (ref 6–12)
PO2 BLD: 267 MM[HG] (ref 0–500)
PO2 BLDA: 80 MM HG (ref 80–100)
POTASSIUM BLDA-SCNC: 4.52 MMOL/L (ref 3.5–5)
POTASSIUM SERPL-SCNC: 4.5 MMOL/L (ref 3.5–5.2)
PROT SERPL-MCNC: 5.6 G/DL (ref 6–8.5)
PROT UR QL STRIP: NEGATIVE
RBC # BLD AUTO: 2.69 10*6/MM3 (ref 3.77–5.28)
RBC # UR STRIP: ABNORMAL /HPF
REF LAB TEST METHOD: ABNORMAL
SAO2 % BLDCOA: 94.5 % (ref 95–99)
SODIUM BLDA-SCNC: 139.3 MMOL/L (ref 136–146)
SODIUM SERPL-SCNC: 138 MMOL/L (ref 136–145)
SP GR UR STRIP: 1.01 (ref 1–1.03)
SQUAMOUS #/AREA URNS HPF: ABNORMAL /HPF
UROBILINOGEN UR QL STRIP: ABNORMAL
VANCOMYCIN SERPL-MCNC: 15.7 MCG/ML (ref 5–40)
WBC # UR STRIP: ABNORMAL /HPF
WBC NRBC COR # BLD: 9.39 10*3/MM3 (ref 3.4–10.8)
YEAST URNS QL MICRO: ABNORMAL /HPF

## 2023-10-30 PROCEDURE — 25010000002 HEPARIN (PORCINE) PER 1000 UNITS: Performed by: INTERNAL MEDICINE

## 2023-10-30 PROCEDURE — 25010000002 METHYLPREDNISOLONE PER 40 MG: Performed by: INTERNAL MEDICINE

## 2023-10-30 PROCEDURE — 25010000002 PROPOFOL 10 MG/ML EMULSION: Performed by: INTERNAL MEDICINE

## 2023-10-30 PROCEDURE — 83050 HGB METHEMOGLOBIN QUAN: CPT | Performed by: NURSE PRACTITIONER

## 2023-10-30 PROCEDURE — 94799 UNLISTED PULMONARY SVC/PX: CPT

## 2023-10-30 PROCEDURE — 83036 HEMOGLOBIN GLYCOSYLATED A1C: CPT | Performed by: NURSE PRACTITIONER

## 2023-10-30 PROCEDURE — 83735 ASSAY OF MAGNESIUM: CPT | Performed by: INTERNAL MEDICINE

## 2023-10-30 PROCEDURE — 80202 ASSAY OF VANCOMYCIN: CPT | Performed by: NURSE PRACTITIONER

## 2023-10-30 PROCEDURE — 36415 COLL VENOUS BLD VENIPUNCTURE: CPT | Performed by: INTERNAL MEDICINE

## 2023-10-30 PROCEDURE — 25010000002 FUROSEMIDE PER 20 MG: Performed by: INTERNAL MEDICINE

## 2023-10-30 PROCEDURE — 25010000002 CEFEPIME PER 500 MG: Performed by: NURSE PRACTITIONER

## 2023-10-30 PROCEDURE — 99291 CRITICAL CARE FIRST HOUR: CPT | Performed by: INTERNAL MEDICINE

## 2023-10-30 PROCEDURE — 82948 REAGENT STRIP/BLOOD GLUCOSE: CPT

## 2023-10-30 PROCEDURE — 85025 COMPLETE CBC W/AUTO DIFF WBC: CPT | Performed by: INTERNAL MEDICINE

## 2023-10-30 PROCEDURE — 94760 N-INVAS EAR/PLS OXIMETRY 1: CPT

## 2023-10-30 PROCEDURE — 36600 WITHDRAWAL OF ARTERIAL BLOOD: CPT | Performed by: NURSE PRACTITIONER

## 2023-10-30 PROCEDURE — 82805 BLOOD GASES W/O2 SATURATION: CPT | Performed by: NURSE PRACTITIONER

## 2023-10-30 PROCEDURE — 25010000002 VANCOMYCIN 5 G RECONSTITUTED SOLUTION: Performed by: NURSE PRACTITIONER

## 2023-10-30 PROCEDURE — 94003 VENT MGMT INPAT SUBQ DAY: CPT

## 2023-10-30 PROCEDURE — 94761 N-INVAS EAR/PLS OXIMETRY MLT: CPT

## 2023-10-30 PROCEDURE — 80053 COMPREHEN METABOLIC PANEL: CPT | Performed by: INTERNAL MEDICINE

## 2023-10-30 PROCEDURE — 94664 DEMO&/EVAL PT USE INHALER: CPT

## 2023-10-30 PROCEDURE — 82375 ASSAY CARBOXYHB QUANT: CPT | Performed by: NURSE PRACTITIONER

## 2023-10-30 PROCEDURE — 63710000001 INSULIN DETEMIR PER 5 UNITS: Performed by: NURSE PRACTITIONER

## 2023-10-30 PROCEDURE — 81001 URINALYSIS AUTO W/SCOPE: CPT | Performed by: NURSE PRACTITIONER

## 2023-10-30 RX ORDER — BISACODYL 10 MG
10 SUPPOSITORY, RECTAL RECTAL DAILY PRN
Status: DISCONTINUED | OUTPATIENT
Start: 2023-10-30 | End: 2023-11-05 | Stop reason: HOSPADM

## 2023-10-30 RX ORDER — NICOTINE POLACRILEX 4 MG
15 LOZENGE BUCCAL
Status: DISCONTINUED | OUTPATIENT
Start: 2023-10-30 | End: 2023-10-31 | Stop reason: SDUPTHER

## 2023-10-30 RX ORDER — DEXTROSE MONOHYDRATE 25 G/50ML
25 INJECTION, SOLUTION INTRAVENOUS
Status: DISCONTINUED | OUTPATIENT
Start: 2023-10-30 | End: 2023-10-31 | Stop reason: SDUPTHER

## 2023-10-30 RX ORDER — LEVALBUTEROL INHALATION SOLUTION 1.25 MG/3ML
1.25 SOLUTION RESPIRATORY (INHALATION) EVERY 6 HOURS PRN
Status: DISCONTINUED | OUTPATIENT
Start: 2023-10-30 | End: 2023-11-05 | Stop reason: HOSPADM

## 2023-10-30 RX ORDER — IBUPROFEN 600 MG/1
1 TABLET ORAL
Status: DISCONTINUED | OUTPATIENT
Start: 2023-10-30 | End: 2023-11-05 | Stop reason: HOSPADM

## 2023-10-30 RX ORDER — NEBIVOLOL 10 MG/1
10 TABLET ORAL
Status: DISCONTINUED | OUTPATIENT
Start: 2023-10-30 | End: 2023-11-04

## 2023-10-30 RX ORDER — PREDNISONE 20 MG/1
40 TABLET ORAL
Status: DISCONTINUED | OUTPATIENT
Start: 2023-10-31 | End: 2023-10-31

## 2023-10-30 RX ORDER — HYDROCODONE BITARTRATE AND ACETAMINOPHEN 5; 325 MG/1; MG/1
1 TABLET ORAL EVERY 4 HOURS PRN
Status: DISPENSED | OUTPATIENT
Start: 2023-10-30 | End: 2023-11-05

## 2023-10-30 RX ORDER — FUROSEMIDE 10 MG/ML
80 INJECTION INTRAMUSCULAR; INTRAVENOUS EVERY 8 HOURS
Status: DISCONTINUED | OUTPATIENT
Start: 2023-10-30 | End: 2023-11-01

## 2023-10-30 RX ORDER — POLYETHYLENE GLYCOL 3350 17 G/17G
17 POWDER, FOR SOLUTION ORAL DAILY PRN
Status: DISCONTINUED | OUTPATIENT
Start: 2023-10-30 | End: 2023-11-05 | Stop reason: HOSPADM

## 2023-10-30 RX ORDER — ACETAMINOPHEN 325 MG/1
650 TABLET ORAL EVERY 4 HOURS PRN
Status: DISCONTINUED | OUTPATIENT
Start: 2023-10-30 | End: 2023-11-05 | Stop reason: HOSPADM

## 2023-10-30 RX ORDER — AMOXICILLIN 250 MG
1 CAPSULE ORAL 2 TIMES DAILY
Status: DISCONTINUED | OUTPATIENT
Start: 2023-10-30 | End: 2023-11-05 | Stop reason: HOSPADM

## 2023-10-30 RX ADMIN — IPRATROPIUM BROMIDE AND ALBUTEROL SULFATE 3 ML: .5; 3 SOLUTION RESPIRATORY (INHALATION) at 04:09

## 2023-10-30 RX ADMIN — Medication 50 MCG/HR: at 00:27

## 2023-10-30 RX ADMIN — FUROSEMIDE 80 MG: 10 INJECTION, SOLUTION INTRAMUSCULAR; INTRAVENOUS at 10:38

## 2023-10-30 RX ADMIN — CEFEPIME 2000 MG: 2 INJECTION, POWDER, FOR SOLUTION INTRAVENOUS at 05:14

## 2023-10-30 RX ADMIN — INSULIN DETEMIR 10 UNITS: 100 INJECTION, SOLUTION SUBCUTANEOUS at 21:10

## 2023-10-30 RX ADMIN — ARFORMOTEROL TARTRATE 15 MCG: 15 SOLUTION RESPIRATORY (INHALATION) at 18:30

## 2023-10-30 RX ADMIN — IPRATROPIUM BROMIDE AND ALBUTEROL SULFATE 3 ML: .5; 3 SOLUTION RESPIRATORY (INHALATION) at 06:41

## 2023-10-30 RX ADMIN — FUROSEMIDE 80 MG: 10 INJECTION, SOLUTION INTRAMUSCULAR; INTRAVENOUS at 17:38

## 2023-10-30 RX ADMIN — VANCOMYCIN HYDROCHLORIDE 500 MG: 500 INJECTION, POWDER, LYOPHILIZED, FOR SOLUTION INTRAVENOUS at 08:10

## 2023-10-30 RX ADMIN — BUDESONIDE 0.5 MG: 0.5 INHALANT RESPIRATORY (INHALATION) at 18:30

## 2023-10-30 RX ADMIN — ARFORMOTEROL TARTRATE 15 MCG: 15 SOLUTION RESPIRATORY (INHALATION) at 06:41

## 2023-10-30 RX ADMIN — PROPOFOL 40 MCG/KG/MIN: 10 INJECTION, EMULSION INTRAVENOUS at 03:38

## 2023-10-30 RX ADMIN — NEBIVOLOL HYDROCHLORIDE 10 MG: 10 TABLET ORAL at 13:10

## 2023-10-30 RX ADMIN — METHYLPREDNISOLONE SODIUM SUCCINATE 40 MG: 40 INJECTION INTRAMUSCULAR; INTRAVENOUS at 06:04

## 2023-10-30 RX ADMIN — HEPARIN SODIUM 5000 UNITS: 5000 INJECTION INTRAVENOUS; SUBCUTANEOUS at 21:10

## 2023-10-30 RX ADMIN — BUDESONIDE 0.5 MG: 0.5 INHALANT RESPIRATORY (INHALATION) at 06:41

## 2023-10-30 RX ADMIN — Medication 10 ML: at 08:12

## 2023-10-30 RX ADMIN — HEPARIN SODIUM 5000 UNITS: 5000 INJECTION INTRAVENOUS; SUBCUTANEOUS at 05:07

## 2023-10-30 RX ADMIN — FUROSEMIDE 40 MG: 10 INJECTION, SOLUTION INTRAMUSCULAR; INTRAVENOUS at 04:53

## 2023-10-30 RX ADMIN — FAMOTIDINE 20 MG: 10 INJECTION INTRAVENOUS at 08:10

## 2023-10-30 RX ADMIN — HEPARIN SODIUM 5000 UNITS: 5000 INJECTION INTRAVENOUS; SUBCUTANEOUS at 13:09

## 2023-10-30 RX ADMIN — Medication 10 ML: at 21:10

## 2023-10-30 NOTE — PROGRESS NOTES
[facility]     Nephrology Progress Note      Patient Name: Charlette Rai  : 1935  MRN: [id number]  Primary Care Physician:  Clyde Lainez MD  Date of admission: 10/29/2023    Subjective   Subjective     Interval History:  Patient Reports feeling better.  Extubated this morning.  I saw this morning, this is a late note entry.  She told me that she had rapid onset shortness of breath with increased edema.  Prior to admission there have been no changes to her diuretics or diet.    Review of Systems   All systems were reviewed and negative except for: What is mentioned above.    Objective   Objective     Vitals:   Temp:  [98.7 °F (37.1 °C)-99.2 °F (37.3 °C)] 99 °F (37.2 °C)  Heart Rate:  [] 105  Resp:  [12-26] 20  BP: (116-166)/() 144/50  Flow (L/min):  [3] 3  FiO2 (%):  [25 %-40 %] 30 %  Physical Exam:   Constitutional: Awake, alert, mildly hoarse, just extubated.   Eyes: sclerae anicteric, no conjunctival injection   HENT: mucous membranes moist   Neck: Supple, no thyromegaly, no lymphadenopathy, trachea midline, + JVD, full EJ's bilaterally   Respiratory: Decreased to auscultation bilaterally, nonlabored respirations, minimal scattered Rales.   Cardiovascular: RRR, no murmurs, rubs, or gallops.   Gastrointestinal: Positive bowel sounds, soft, nontender, nondistended   Musculoskeletal: + Hip edema, no clubbing or cyanosis   Psychiatric: Appropriate affect, cooperative   Neurologic: Oriented x 3, moving all extremities, Cranial Nerves grossly intact, speech clear   Skin: warm and dry, no rashes    Duenas catheter in place.    Result Review    Result Reviewed:  I have personally reviewed the results from the time of this admission to 10/30/2023 15:18 EDT and agree with these findings:  [x]  Laboratory  []  Microbiology  [x]  Radiology  []  EKG/Telemetry   []  Cardiology/Vascular   []  Pathology  [x]  Old records  []  Other:  Lab Results   Component Value Date    GLUCOSE 193 (H) 10/30/2023     CALCIUM 8.2 (L) 10/30/2023    .3 10/30/2023    K 4.5 10/30/2023    CO2 17.6 (L) 10/30/2023     10/30/2023    BUN 78 (H) 10/30/2023    CREATININE 2.48 (H) 10/30/2023    EGFRIFNONA 17 (L) 01/10/2022    BCR 31.5 (H) 10/30/2023    ANIONGAP 13.4 10/30/2023     Lab Results   Component Value Date    CALCIUM 8.2 (L) 10/30/2023    PHOS 5.9 (H) 10/29/2023      Results from last 7 days   Lab Units 10/30/23  0249   MAGNESIUM mg/dL 1.9          Most notable findings include: Creatinine 2.48, bicarb 17, hemoglobin 7.9.    Assessment & Plan   Assessment / Plan       Active Hospital Problems:  Active Hospital Problems    Diagnosis     **Acute respiratory failure with hypoxia and hypercapnia     Respiratory failure     Bilateral pneumonia     COPD with acute exacerbation     Pulmonary edema     Hyperkalemia     Type 2 diabetes mellitus without complication     Stage 4 chronic kidney disease        Assessment and Plan:    - CKD stage IV, due to diabetes, hypertension and previous contrast exposure, baseline creatinine between 2.2 and 2.6, here with acute respiratory failure with CHF and pulmonary edema.  Improving.  On IV Lasix.  Extubated earlier today.  Increase Lasix to 80 mg IV every 8 hours for 1 or 2 days and monitor closely.  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  - Metabolic acidosis, likely due to renal failure, recheck in AM.  May need bicarb replacement.  - Hyperkalemia, resolved.  - Severe anemia, check iron studies.  Transfuse as needed.  - Bilateral renal artery stenosis with history of hypertension.  Now blood pressure is controlled.  - History of lung cancer, status post radiation.  - Diabetes, blood sugars are better.  on insulin drip.  - Possible UTI, antibiotics per primary.  - Congestive heart failure, diuresis for now, consider repeating 2D echo.  Discussed with nursing staff.  Will follow.      Electronically signed by Edi García MD, 10/30/23, 3:18 PM  EDT.

## 2023-10-30 NOTE — PLAN OF CARE
Goal Outcome Evaluation:      Patient extubated today to 3L nasal cannula. Patient up to bedside commode x1 assist today. Insulin gtt discontinued. VSS.No significant events throughout shift. Rosanna Parikh RN

## 2023-10-30 NOTE — CONSULTS
Nutrition Services    Patient Name: Charlette Rai  YOB: 1935  MRN: [id number]  Admission date: 10/29/2023      CLINICAL NUTRITION ASSESSMENT      Reason for Assessment  Follow-up protocol     H&P:    Past Medical History:   Diagnosis Date    Allergic rhinitis     Anemia     Arthritis     Asthma     USE INHALERS AND NEBULIZERS    Back pain     Bladder disorder     Cancer     LEFT LUNG CANCER 2005 SURGERY AND CHEMO DONE  AND CURRENTLY 4/ 14/22  RIGHT LUNG CANCER HAS ONLY RECEIVED RADIATION THUS FAR    Chronic kidney disease     stage 3    CKD (chronic kidney disease) stage 4, GFR 15-29 ml/min     Condition not found     ulcer    Congestive heart failure (CHF)     FOLLOWED BY DR AQUINO. DENIES CP BUT DOES HAVE SOA CHRONIC ISSUE COPD/LUNG CANCER    COPD (chronic obstructive pulmonary disease)     FOLLOWED BY DR MARIAJOSE BAILEY    Coronary artery disease     DENIES CP BUT DOES GET SOA MOST OF THE TIME WITH EXERTION BUT OCC AT REST CHRONIC ISSUE COPD/LUNG CANCER    Deep vein thrombosis     Diabetes mellitus     DOES NOT CHECK BS DAILY    Disease of thyroid gland     HYPOTHYROIDISM    Essential hypertension     Gastric ulcer     GERD (gastroesophageal reflux disease)     Heart murmur     History of transfusion     NO ISSUES POST TRANSFUSION WAS MANY YEARS AGO    Hyperlipemia     Leukocytopenia     FOLLOWED BY DR MIR BAILEY    Limb swelling     Lumbago     Lumbar spinal stenosis     Lung cancer     Migraine headache     Multiple joint pain     Osteopenia     Reflux esophagitis     Shortness of breath     Thyroid nodule     HAS ULTRASOUND YEARLY BEING MONITORED    Vascular disease     Vitamin D deficiency         Current Problems:   Active Hospital Problems    Diagnosis     **Acute respiratory failure with hypoxia and hypercapnia     Respiratory failure     Bilateral pneumonia     COPD with acute exacerbation     Pulmonary edema     Hyperkalemia     Type 2 diabetes mellitus without complication     Stage 4 chronic  "kidney disease         Nutrition/Diet History         Narrative     Patient admitted with acute respiratory failure.  Initially required intubation.  Patient extubated on 10/30/23.  Oral diet has been ordered.  Will monitor for adequacy of PO intake.       Anthropometrics        Current Height, Weight Height: 165.1 cm (65\")  Weight: 78.2 kg (172 lb 6.4 oz)   Current BMI Body mass index is 28.69 kg/m².       Weight Hx  Wt Readings from Last 30 Encounters:   10/29/23 1223 78.2 kg (172 lb 6.4 oz)   10/29/23 0529 72.3 kg (159 lb 6.3 oz)   10/23/23 1500 69.8 kg (153 lb 14.1 oz)   07/07/23 1054 66.7 kg (147 lb)   03/23/23 0802 66 kg (145 lb 8.1 oz)   03/21/23 1315 65.8 kg (145 lb)   12/13/22 1118 66.7 kg (147 lb)   10/07/22 1259 68.5 kg (151 lb)   08/29/22 0832 68 kg (150 lb)   06/27/22 0912 70.3 kg (155 lb)   05/10/22 0558 70.5 kg (155 lb 6.8 oz)   04/14/22 1315 72.5 kg (159 lb 13.3 oz)   04/08/22 2116 72.1 kg (159 lb)   03/08/22 1305 74.4 kg (164 lb)   02/08/22 1252 74.7 kg (164 lb 9.6 oz)   01/10/22 1443 74.7 kg (164 lb 9.6 oz)   12/08/21 1027 73.9 kg (163 lb)   12/06/21 1039 74.8 kg (165 lb)   08/02/21 1104 75.4 kg (166 lb 3.2 oz)   06/23/21 1120 75.7 kg (166 lb 14.2 oz)   06/21/21 1106 74.9 kg (165 lb 2 oz)   06/16/21 1148 74.8 kg (164 lb 14.5 oz)   06/14/21 1057 75.1 kg (165 lb 9.1 oz)   05/13/21 0000 73 kg (161 lb)   05/07/21 0000 75.5 kg (166 lb 6 oz)   04/16/21 0000 76.3 kg (168 lb 4 oz)   03/26/21 0000 75.3 kg (166 lb)   03/08/21 0000 75.3 kg (166 lb)   02/23/21 0000 77.2 kg (170 lb 2 oz)   01/12/21 0000 76.7 kg (169 lb 3 oz)   12/22/20 0000 75.7 kg (167 lb)   12/07/20 0000 75.3 kg (166 lb)            Wt Change Observation stable     Estimated/Assessed Needs       Energy Requirements 12-25 kcal.kg    EST Needs (kcal/day) 868-1808 kcal        Protein Requirements 1.2-2.0 g/kg   EST Daily Needs (g/day)  g       Fluid Requirements 25 ml/kg     Estimated Needs (mL/day) 1808 ml      Labs/Medications         " Pertinent Labs Reviewed.   Results from last 7 days   Lab Units 10/30/23  0801 10/30/23  0249 10/29/23  0754 10/29/23  0450 10/29/23  0448   SODIUM mmol/L  --  138  --   --  133*   SODIUM, ARTERIAL mmol/L 139.3  --  133.9*   < >  --    POTASSIUM mmol/L  --  4.5  --   --  5.4*   CHLORIDE mmol/L  --  107  --   --  100   CO2 mmol/L  --  17.6*  --   --  21.9*   BUN mg/dL  --  78*  --   --  78*   CREATININE mg/dL  --  2.48*  --   --  2.68*   CALCIUM mg/dL  --  8.2*  --   --  8.4*   BILIRUBIN mg/dL  --  0.2  --   --  0.2   ALK PHOS U/L  --  193*  --   --  310*   ALT (SGPT) U/L  --  18  --   --  27   AST (SGOT) U/L  --  18  --   --  26   GLUCOSE mg/dL  --  198*  --   --  572*   GLUCOSE, ARTERIAL mg/dL 193*  --  509*   < >  --     < > = values in this interval not displayed.     Results from last 7 days   Lab Units 10/30/23  0249 10/29/23  0448   MAGNESIUM mg/dL 1.9 2.2   PHOSPHORUS mg/dL  --  5.9*   HEMOGLOBIN g/dL 7.9* 9.5*   HEMATOCRIT % 25.6* 32.2*     COVID19   Date Value Ref Range Status   10/29/2023 Not Detected Not Detected - Ref. Range Final     Lab Results   Component Value Date    HGBA1C 6.70 (H) 07/26/2023         Pertinent Medications Reviewed.     Current Nutrition Orders & Evaluation of Intake       Oral Nutrition     Current PO Diet Diet: Regular/House Diet, Cardiac Diets, Diabetic Diets; Healthy Heart (2-3 Na+); Consistent Carbohydrate; Texture: Regular Texture (IDDSI 7); Fluid Consistency: Thin (IDDSI 0)   Supplement No active supplement orders       Malnutrition Severity Assessment                Nutrition Diagnosis         Nutrition Dx Problem 1 No nutrition diagnosis at this time        Nutrition Intervention         No nutrition intervention indicated at this time.       Medical Nutrition Therapy/Nutrition Education          Learner     Readiness Patient  Education not indicated at this time     Method     Response N/A  N/A     Monitor/Evaluation        Monitor Per protocol, PO intake, Pertinent labs,  Weight, POC/GOC       Nutrition Discharge Plan         To be determined       Electronically signed by:  Tanvi Wayne RD  10/30/23 14:09 EDT

## 2023-10-30 NOTE — PLAN OF CARE
Goal Outcome Evaluation:         PATIENT DID AN AMAZING NIF -50 VITAL CAPACITY 1200ML, RATE 14, RSBI 14. PATIENT IS READY TO BE EXTUBATED.

## 2023-10-30 NOTE — PROGRESS NOTES
Pulmonary / Critical Care Progress Note      Patient Name: Charlette Rai  : 1935  MRN: [id number]  Attending:  Rafael Lyons MD   Date of admission: 10/29/2023    Subjective   Subjective   Patient critically ill with hypercapnic respiratory failure, CKD stage IV, volume overload    Patient tolerated pressure support this morning, following commands, received IV diuresis, urine output 2700/24 hours, remains on insulin drip    Review of Systems  Unable to obtain on vent      Objective   Objective     Vitals:   Vital signs for last 24 hours:  Temp:  [98.7 °F (37.1 °C)-99.2 °F (37.3 °C)] 99 °F (37.2 °C)  Heart Rate:  [] 112  Resp:  [12-26] 22  BP: (109-166)/() 166/63  FiO2 (%):  [25 %-40 %] 30 %    Intake/Output last 3 shifts:  I/O last 3 completed shifts:  In: 2605 [I.V.:660; Other:240; NG/GT:1605; IV Piggyback:100]  Out: 2733 [Urine:2733]  Intake/Output this shift:  No intake/output data recorded.    Vent settings for last 24 hours:  FiO2 (%):  [25 %-40 %] 30 %  S RR:  [26] 26  PEEP/CPAP (cm H2O):  [5 cm H20] 5 cm H20  SC SUP:  [5 cm H20] 5 cm H20  MAP (cm H2O):  [6.9-12] 6.9    Physical Exam   Elderly female with  Chronic ill-appearing  Auscultation reveals significantly diminished breath sounds at the bases  She does have a significant systolic murmur concerning for aortic valve stenosis  She is alert and orient  She is following commands  She does have edema present      Result Review    Result Review:  I have personally reviewed the results from the time of this admission to 10/30/2023 12:30 EDT and agree with these findings:  [x]  Laboratory  [x]  Microbiology  [x]  Radiology  []  EKG/Telemetry   []  Cardiology/Vascular   []  Pathology  []  Old records  []  Other:  Most notable findings include:       Lab 10/30/23  0801 10/30/23  0249 10/29/23  0754 10/29/23  0450 10/29/23  0448   WBC  --  9.39  --   --  14.71*   HEMOGLOBIN  --  7.9*  --   --  9.5*   HEMATOCRIT  --  25.6*  --   --  32.2*    PLATELETS  --  209  --   --  307   SODIUM  --  138  --   --  133*   SODIUM, ARTERIAL 139.3  --  133.9* 135.9*  --    POTASSIUM  --  4.5  --   --  5.4*   CHLORIDE  --  107  --   --  100   CO2  --  17.6*  --   --  21.9*   BUN  --  78*  --   --  78*   CREATININE  --  2.48*  --   --  2.68*   GLUCOSE  --  198*  --   --  572*   GLUCOSE, ARTERIAL 193*  --  509* 555*  --    CALCIUM  --  8.2*  --   --  8.4*   PHOSPHORUS  --   --   --   --  5.9*   TOTAL PROTEIN  --  5.6*  --   --  7.5   ALBUMIN  --  3.0*  --   --  4.2   GLOBULIN  --  2.6  --   --  3.3     XR Chest 1 View    Result Date: 10/29/2023    1. Interval placement of endotracheal tube and orogastric tubes which are in good position. 2. Increasing bilateral airspace disease favored to be secondary to pulmonary edema 3. Small left pleural effusion       OMAIRA GIRON MD       Electronically Signed and Approved By: OMAIRA GIRON MD on 10/29/2023 at 5:30              Assessment & Plan   Assessment / Plan     Active Hospital Problems:  Active Hospital Problems    Diagnosis     **Acute respiratory failure with hypoxia and hypercapnia     Respiratory failure     Bilateral pneumonia     COPD with acute exacerbation     Pulmonary edema     Hyperkalemia     Type 2 diabetes mellitus without complication     Stage 4 chronic kidney disease      Impression:  Acute hypoxic hypercarbic respiratory failure requiring mechanical vent  COPD with acute exacerbation  Decompensated CHF-TTE pending  Acute on chronic hypoxic respiratory failure  History of lung cancer s/p lobectomy on the left side  Type 2 diabetes with hyperglycemia  Urinary tract infection  Elevated proBNP  Bilateral lower extremity edema  Anemia of CKD  CKD stage IV  Hyperkalemia  Hypertension  Leukocytosis    Plan:  Spontaneous breathing trial today  Patient extubated  Echocardiogram ordered  I am concerned about the possibility of aortic valve stenosis given the exam finding  We will change nebulizers to Brovana,  Pulmicort, change DuoNebs to Xopenex due to tachycardia  Reduce steroids to prednisone 40 mg daily  Discontinue vancomycin  Continue cefepime till cultures have returned  Continue with IV diuretics currently managed by nephrology given the patient's advanced renal failure  Continue insulin drip  We will try to advance diet today and try to liberate patient from insulin drip later if possible      DVT PPx: Heparin subcu  PCP PPx: Pepcid       DVT prophylaxis:  Medical DVT prophylaxis orders are present.    CODE STATUS:   Code Status (Patient has no pulse and is not breathing): CPR (Attempt to Resuscitate)  Medical Interventions (Patient has pulse or is breathing): Full Support    IJazmin APRN, spent 15 minutes critical care time in accordance to split shared billing.    Electronically signed by DAT Burrell, 10/30/23, 12:30 PM EDT.  Total critical care time spent 45 minutes  The patient is critically ill in the ICU with respiratory failure, decompensated heart failure, COPD. Multidisciplinary bedside critical care rounds were performed with nursing staff, respiratory therapy, pharmacy, nutritional services, social work. I have personally reviewed the chart, labs and any pertinent imaging available.  I have spent 30 minutes of critical care time, excluding procedures, in the care of this patient.    Electronically signed by Regis Holcomb DO, 10/30/23, 2:46 PM EDT.

## 2023-10-30 NOTE — PLAN OF CARE
Goal Outcome Evaluation:      Patient was placed on wean at 0520. She is tolerating it well. I had her spo2 titrated to 25%. But had to go back up to 35%. At this time she is doing well.

## 2023-10-30 NOTE — PROGRESS NOTES
[facility]     Progress Note    Patient Name: Charlette Rai  : 1935  MRN: [id number]  Primary Care Physician:  Clyde Lainez MD  Date of admission: 10/29/2023      Subjective   Brief summary.  Patient admitted with acute respiratory failure to ICU      HPI:  Feeling better, patient extubated.  Blood sugars running high on insulin drip.  No chest pain, shortness of breath significantly improved    Review of Systems     Weakness and fatigue, no chest pain, shortness of breath positive.  No nausea vomiting, blood sugars running high      Objective     Vitals:   Temp:  [98.7 °F (37.1 °C)-99.2 °F (37.3 °C)] 99 °F (37.2 °C)  Heart Rate:  [] 105  Resp:  [12-26] 20  BP: (116-166)/() 144/50  Flow (L/min):  [3] 3  FiO2 (%):  [25 %-40 %] 30 %    Physical Exam :     Elderly female not in acute distress.  Heart regular lungs diminished breath sounds abdomen obese and soft.  Extremities with edema.  Neurologically awake alert and oriented      Result Review:  I have personally reviewed the results from the time of this admission to 10/30/2023 16:42 EDT and agree with these findings:  [x]  Laboratory  []  Microbiology  []  Radiology  []  EKG/Telemetry   []  Cardiology/Vascular   []  Pathology  []  Old records  []  Other:           Assessment / Plan       Active Hospital Problems:  Active Hospital Problems    Diagnosis     **Acute respiratory failure with hypoxia and hypercapnia     Respiratory failure     Bilateral pneumonia     COPD with acute exacerbation     Pulmonary edema     Hyperkalemia     Type 2 diabetes mellitus without complication     Stage 4 chronic kidney disease        Plan:   Switch to subcu insulin.  Patient extubated and improved.  Continue to monitor in ICU, continue nebs and steroids.  Taper steroids.  Check labs       DVT prophylaxis:  Medical DVT prophylaxis orders are present.    CODE STATUS:   Code Status (Patient has no pulse and is not breathing): CPR (Attempt to  Resuscitate)  Medical Interventions (Patient has pulse or is breathing): Full Support            Electronically signed by Rafael Lyons MD, 10/30/23, 4:42 PM EDT.

## 2023-10-31 ENCOUNTER — APPOINTMENT (OUTPATIENT)
Dept: CARDIOLOGY | Facility: HOSPITAL | Age: 88
DRG: 208 | End: 2023-10-31
Payer: MEDICARE

## 2023-10-31 LAB
ALBUMIN SERPL-MCNC: 3.3 G/DL (ref 3.5–5.2)
ANION GAP SERPL CALCULATED.3IONS-SCNC: 12.2 MMOL/L (ref 5–15)
BH CV ECHO MEAS - ACS: 1.6 CM
BH CV ECHO MEAS - AO MAX PG: 21 MMHG
BH CV ECHO MEAS - AO MEAN PG: 13 MMHG
BH CV ECHO MEAS - AO ROOT DIAM: 3.9 CM
BH CV ECHO MEAS - AO V2 MAX: 230 CM/SEC
BH CV ECHO MEAS - AO V2 VTI: 59.4 CM
BH CV ECHO MEAS - AVA(I,D): 1.24 CM2
BH CV ECHO MEAS - EDV(CUBED): 65.9 ML
BH CV ECHO MEAS - EDV(MOD-SP2): 81.6 ML
BH CV ECHO MEAS - EDV(MOD-SP4): 62 ML
BH CV ECHO MEAS - EF(MOD-BP): 56.8 %
BH CV ECHO MEAS - EF(MOD-SP2): 57.2 %
BH CV ECHO MEAS - EF(MOD-SP4): 55.6 %
BH CV ECHO MEAS - ESV(CUBED): 17.6 ML
BH CV ECHO MEAS - ESV(MOD-SP2): 34.9 ML
BH CV ECHO MEAS - ESV(MOD-SP4): 27.5 ML
BH CV ECHO MEAS - FS: 35.6 %
BH CV ECHO MEAS - IVS/LVPW: 1.1 CM
BH CV ECHO MEAS - IVSD: 1.09 CM
BH CV ECHO MEAS - LA DIMENSION: 4 CM
BH CV ECHO MEAS - LAT PEAK E' VEL: 6.1 CM/SEC
BH CV ECHO MEAS - LV MASS(C)D: 136.8 GRAMS
BH CV ECHO MEAS - LV MAX PG: 4.2 MMHG
BH CV ECHO MEAS - LV MEAN PG: 2 MMHG
BH CV ECHO MEAS - LV V1 MAX: 103 CM/SEC
BH CV ECHO MEAS - LV V1 VTI: 23.4 CM
BH CV ECHO MEAS - LVIDD: 4 CM
BH CV ECHO MEAS - LVIDS: 2.6 CM
BH CV ECHO MEAS - LVOT AREA: 3.1 CM2
BH CV ECHO MEAS - LVOT DIAM: 2 CM
BH CV ECHO MEAS - LVPWD: 0.99 CM
BH CV ECHO MEAS - MED PEAK E' VEL: 6.3 CM/SEC
BH CV ECHO MEAS - MV A MAX VEL: 111 CM/SEC
BH CV ECHO MEAS - MV DEC SLOPE: 162 CM/SEC2
BH CV ECHO MEAS - MV DEC TIME: 0.38 SEC
BH CV ECHO MEAS - MV E MAX VEL: 95.2 CM/SEC
BH CV ECHO MEAS - MV E/A: 0.86
BH CV ECHO MEAS - RAP SYSTOLE: 3 MMHG
BH CV ECHO MEAS - RVDD: 2.46 CM
BH CV ECHO MEAS - RVSP: 26.8 MMHG
BH CV ECHO MEAS - SV(LVOT): 73.5 ML
BH CV ECHO MEAS - SV(MOD-SP2): 46.7 ML
BH CV ECHO MEAS - SV(MOD-SP4): 34.5 ML
BH CV ECHO MEAS - TAPSE (>1.6): 1.89 CM
BH CV ECHO MEAS - TR MAX PG: 23.8 MMHG
BH CV ECHO MEAS - TR MAX VEL: 244 CM/SEC
BH CV ECHO MEASUREMENTS AVERAGE E/E' RATIO: 15.35
BUN SERPL-MCNC: 87 MG/DL (ref 8–23)
BUN/CREAT SERPL: 34.4 (ref 7–25)
CALCIUM SPEC-SCNC: 8.1 MG/DL (ref 8.6–10.5)
CHLORIDE SERPL-SCNC: 106 MMOL/L (ref 98–107)
CO2 SERPL-SCNC: 23.8 MMOL/L (ref 22–29)
CREAT SERPL-MCNC: 2.53 MG/DL (ref 0.57–1)
DEPRECATED RDW RBC AUTO: 44.6 FL (ref 37–54)
EGFRCR SERPLBLD CKD-EPI 2021: 18 ML/MIN/1.73
ERYTHROCYTE [DISTWIDTH] IN BLOOD BY AUTOMATED COUNT: 13.2 % (ref 12.3–15.4)
FERRITIN SERPL-MCNC: 1694 NG/ML (ref 13–150)
GLUCOSE BLDC GLUCOMTR-MCNC: 123 MG/DL (ref 70–99)
GLUCOSE BLDC GLUCOMTR-MCNC: 123 MG/DL (ref 70–99)
GLUCOSE BLDC GLUCOMTR-MCNC: 141 MG/DL (ref 70–99)
GLUCOSE BLDC GLUCOMTR-MCNC: 159 MG/DL (ref 70–99)
GLUCOSE BLDC GLUCOMTR-MCNC: 65 MG/DL (ref 70–99)
GLUCOSE BLDC GLUCOMTR-MCNC: 71 MG/DL (ref 70–99)
GLUCOSE BLDC GLUCOMTR-MCNC: 96 MG/DL (ref 70–99)
GLUCOSE BLDC GLUCOMTR-MCNC: 96 MG/DL (ref 70–99)
GLUCOSE SERPL-MCNC: 113 MG/DL (ref 65–99)
HCT VFR BLD AUTO: 28 % (ref 34–46.6)
HGB BLD-MCNC: 8.7 G/DL (ref 12–15.9)
IRON 24H UR-MRATE: 192 MCG/DL (ref 37–145)
IRON SATN MFR SERPL: 77 % (ref 20–50)
LEFT ATRIUM VOLUME INDEX: 39 ML/M2
MAGNESIUM SERPL-MCNC: 2 MG/DL (ref 1.6–2.4)
MCH RBC QN AUTO: 29.7 PG (ref 26.6–33)
MCHC RBC AUTO-ENTMCNC: 31.1 G/DL (ref 31.5–35.7)
MCV RBC AUTO: 95.6 FL (ref 79–97)
PHOSPHATE SERPL-MCNC: 3.7 MG/DL (ref 2.5–4.5)
PLATELET # BLD AUTO: 231 10*3/MM3 (ref 140–450)
PMV BLD AUTO: 10.5 FL (ref 6–12)
POTASSIUM SERPL-SCNC: 3.6 MMOL/L (ref 3.5–5.2)
RBC # BLD AUTO: 2.93 10*6/MM3 (ref 3.77–5.28)
SODIUM SERPL-SCNC: 142 MMOL/L (ref 136–145)
TIBC SERPL-MCNC: 250 MCG/DL (ref 298–536)
TRANSFERRIN SERPL-MCNC: 168 MG/DL (ref 200–360)
WBC NRBC COR # BLD: 9.66 10*3/MM3 (ref 3.4–10.8)

## 2023-10-31 PROCEDURE — 93306 TTE W/DOPPLER COMPLETE: CPT | Performed by: INTERNAL MEDICINE

## 2023-10-31 PROCEDURE — 25010000002 FUROSEMIDE PER 20 MG: Performed by: INTERNAL MEDICINE

## 2023-10-31 PROCEDURE — 82728 ASSAY OF FERRITIN: CPT | Performed by: INTERNAL MEDICINE

## 2023-10-31 PROCEDURE — 99232 SBSQ HOSP IP/OBS MODERATE 35: CPT | Performed by: INTERNAL MEDICINE

## 2023-10-31 PROCEDURE — 25010000002 CEFEPIME PER 500 MG: Performed by: NURSE PRACTITIONER

## 2023-10-31 PROCEDURE — 25010000002 HEPARIN (PORCINE) PER 1000 UNITS: Performed by: INTERNAL MEDICINE

## 2023-10-31 PROCEDURE — 94799 UNLISTED PULMONARY SVC/PX: CPT

## 2023-10-31 PROCEDURE — 84466 ASSAY OF TRANSFERRIN: CPT | Performed by: INTERNAL MEDICINE

## 2023-10-31 PROCEDURE — 94664 DEMO&/EVAL PT USE INHALER: CPT

## 2023-10-31 PROCEDURE — 85027 COMPLETE CBC AUTOMATED: CPT | Performed by: INTERNAL MEDICINE

## 2023-10-31 PROCEDURE — 83735 ASSAY OF MAGNESIUM: CPT | Performed by: INTERNAL MEDICINE

## 2023-10-31 PROCEDURE — 80069 RENAL FUNCTION PANEL: CPT | Performed by: INTERNAL MEDICINE

## 2023-10-31 PROCEDURE — 63710000001 PREDNISONE PER 1 MG: Performed by: NURSE PRACTITIONER

## 2023-10-31 PROCEDURE — 83540 ASSAY OF IRON: CPT | Performed by: INTERNAL MEDICINE

## 2023-10-31 PROCEDURE — 93306 TTE W/DOPPLER COMPLETE: CPT

## 2023-10-31 PROCEDURE — 82948 REAGENT STRIP/BLOOD GLUCOSE: CPT

## 2023-10-31 PROCEDURE — 97161 PT EVAL LOW COMPLEX 20 MIN: CPT

## 2023-10-31 PROCEDURE — 63710000001 INSULIN LISPRO (HUMAN) PER 5 UNITS: Performed by: NURSE PRACTITIONER

## 2023-10-31 RX ORDER — DEXTROSE MONOHYDRATE 25 G/50ML
25 INJECTION, SOLUTION INTRAVENOUS
Status: DISCONTINUED | OUTPATIENT
Start: 2023-10-31 | End: 2023-11-05 | Stop reason: HOSPADM

## 2023-10-31 RX ORDER — AMLODIPINE BESYLATE 5 MG/1
5 TABLET ORAL
Status: DISCONTINUED | OUTPATIENT
Start: 2023-10-31 | End: 2023-11-03

## 2023-10-31 RX ORDER — NICOTINE POLACRILEX 4 MG
15 LOZENGE BUCCAL
Status: DISCONTINUED | OUTPATIENT
Start: 2023-10-31 | End: 2023-11-05 | Stop reason: HOSPADM

## 2023-10-31 RX ORDER — INSULIN LISPRO 100 [IU]/ML
2-7 INJECTION, SOLUTION INTRAVENOUS; SUBCUTANEOUS
Status: DISCONTINUED | OUTPATIENT
Start: 2023-10-31 | End: 2023-11-05 | Stop reason: HOSPADM

## 2023-10-31 RX ADMIN — PREDNISONE 40 MG: 20 TABLET ORAL at 08:14

## 2023-10-31 RX ADMIN — Medication 10 ML: at 08:15

## 2023-10-31 RX ADMIN — ARFORMOTEROL TARTRATE 15 MCG: 15 SOLUTION RESPIRATORY (INHALATION) at 19:14

## 2023-10-31 RX ADMIN — BUDESONIDE 0.5 MG: 0.5 INHALANT RESPIRATORY (INHALATION) at 06:29

## 2023-10-31 RX ADMIN — FAMOTIDINE 20 MG: 10 INJECTION INTRAVENOUS at 08:14

## 2023-10-31 RX ADMIN — FUROSEMIDE 80 MG: 10 INJECTION, SOLUTION INTRAMUSCULAR; INTRAVENOUS at 17:32

## 2023-10-31 RX ADMIN — HEPARIN SODIUM 5000 UNITS: 5000 INJECTION INTRAVENOUS; SUBCUTANEOUS at 13:24

## 2023-10-31 RX ADMIN — FUROSEMIDE 80 MG: 10 INJECTION, SOLUTION INTRAMUSCULAR; INTRAVENOUS at 09:33

## 2023-10-31 RX ADMIN — FUROSEMIDE 80 MG: 10 INJECTION, SOLUTION INTRAMUSCULAR; INTRAVENOUS at 01:36

## 2023-10-31 RX ADMIN — BUDESONIDE 0.5 MG: 0.5 INHALANT RESPIRATORY (INHALATION) at 19:14

## 2023-10-31 RX ADMIN — INSULIN LISPRO 2 UNITS: 100 INJECTION, SOLUTION INTRAVENOUS; SUBCUTANEOUS at 11:17

## 2023-10-31 RX ADMIN — ACETAMINOPHEN 650 MG: 325 TABLET ORAL at 19:25

## 2023-10-31 RX ADMIN — NEBIVOLOL HYDROCHLORIDE 10 MG: 10 TABLET ORAL at 08:14

## 2023-10-31 RX ADMIN — AMLODIPINE BESYLATE 5 MG: 5 TABLET ORAL at 15:12

## 2023-10-31 RX ADMIN — ARFORMOTEROL TARTRATE 15 MCG: 15 SOLUTION RESPIRATORY (INHALATION) at 06:29

## 2023-10-31 RX ADMIN — HEPARIN SODIUM 5000 UNITS: 5000 INJECTION INTRAVENOUS; SUBCUTANEOUS at 22:09

## 2023-10-31 RX ADMIN — Medication 10 ML: at 20:47

## 2023-10-31 RX ADMIN — CEFEPIME 2000 MG: 2 INJECTION, POWDER, FOR SOLUTION INTRAVENOUS at 05:12

## 2023-10-31 RX ADMIN — HEPARIN SODIUM 5000 UNITS: 5000 INJECTION INTRAVENOUS; SUBCUTANEOUS at 05:12

## 2023-10-31 NOTE — PLAN OF CARE
Goal Outcome Evaluation:  Plan of Care Reviewed With: patient        Progress: no change  Outcome Evaluation: Pt presents with decreased strength, decreasd activity tolerance and decreased standing balance limiting safe and independent ambulation. Pt will benefit from PT services to address these impairments and restore maximum level of prior level of function with mobility.      Anticipated Discharge Disposition (PT): home with home health, home with assist

## 2023-10-31 NOTE — PROGRESS NOTES
[facility]     Progress Note    Patient Name: Charlette Rai  : 1935  MRN: [id number]  Primary Care Physician:  Rafael Lyons MD  Date of admission: 10/29/2023      Subjective   Brief summary.  Patient admitted with acute respiratory failure to ICU      HPI:  Patient improving, doing better.  Blood pressure slightly high, no chest pain or shortness of breath, complains of fatigue    Review of Systems     Weakness and fatigue, no chest pain, shortness of breath positive.  No nausea vomiting, blood sugars improved significantly    Objective     Vitals:   Temp:  [98.1 °F (36.7 °C)-98.8 °F (37.1 °C)] 98.8 °F (37.1 °C)  Heart Rate:  [77-98] 86  Resp:  [16-24] 20  BP: (131-182)/(51-81) 168/62  Flow (L/min):  [3] 3    Physical Exam :     Elderly female not in acute distress.  Heart regular lungs diminished breath sounds abdomen obese and soft.  Extremities with edema.  Neurologically awake alert and oriented      Result Review:  I have personally reviewed the results from the time of this admission to 10/31/2023 16:19 EDT and agree with these findings:  [x]  Laboratory  []  Microbiology  []  Radiology  []  EKG/Telemetry   []  Cardiology/Vascular   []  Pathology  []  Old records  []  Other:    Blood sugar 159      Assessment / Plan       Active Hospital Problems:  Active Hospital Problems    Diagnosis     **Acute respiratory failure with hypoxia and hypercapnia     Respiratory failure     Bilateral pneumonia     COPD with acute exacerbation     Pulmonary edema     Hyperkalemia     Type 2 diabetes mellitus without complication     Stage 4 chronic kidney disease        Plan:     Respiratory status improved.  Blood pressure high, restart Norvasc  Patient improving, A1c slightly high at 7.8.  Decrease long-acting insulin  Overall stable and improving, transfer out of unit.       DVT prophylaxis:  Medical DVT prophylaxis orders are present.    CODE STATUS:   Code Status (Patient has no pulse and is not breathing): CPR  (Attempt to Resuscitate)  Medical Interventions (Patient has pulse or is breathing): Full Support              Electronically signed by Rafael Lyons MD, 10/31/23, 4:20 PM EDT.

## 2023-10-31 NOTE — CASE MANAGEMENT/SOCIAL WORK
Discharge Planning Assessment  MAURICE Escamilla     Patient Name: Charlette Rai  MRN: [id number]  Today's Date: 10/31/2023    Admit Date: 10/29/2023    Plan: Assessment completed at bedside with patient. CM role explained and discharge planning discussed. Information on facesheet verified.  MIR Lainez was PCP, patient states she would like to see Dr. Lyons after discharge.  CM reached out to Dr. Lyons who is agreeable to take her on as new patient. Denies barriers affording medications. Reports compliance with medications and appointments. Patient was intubated this admission, RT CM consulted. Patient reports she is independent at home, reports she has 2 daughters that provide transportation to appointments. Patient reports she has nocutrnal 02 with [employer], may need order for continuous home 02 on discharge. Patient denies falls at home, reports she has been to inpatient rehab in past and will not go back. PT irvin recommending home with [facility], patient reports she has [facility] but unsure of agency. Plan is to return home on discharge and will have transportation. Will continue to follow and assist for discharge planning needs.   Discharge Needs Assessment       Row Name 10/31/23 0919       Living Environment    People in Home spouse    Current Living Arrangements home    Potentially Unsafe Housing Conditions none    In the past 12 months has the electric, gas, oil, or water company threatened to shut off services in your home? No    Primary Care Provided by self    Provides Primary Care For no one    Family Caregiver if Needed child(maryam), adult    Quality of Family Relationships helpful;involved;supportive    Able to Return to Prior Arrangements yes       Resource/Environmental Concerns    Resource/Environmental Concerns none       Transition Planning    Patient/Family Anticipates Transition to home with family    Patient/Family Anticipated Services at Transition home health care  Reports she is current with [facility]    Transportation  Anticipated family or friend will provide       Discharge Needs Assessment    Readmission Within the Last 30 Days no previous admission in last 30 days    Equipment Currently Used at Home cane, quad tip;glucometer;nebulizer;oxygen;other (see comments)  States she is only on nocturnal 02, may need order for continuous    Concerns to be Addressed discharge planning    Equipment Needed After Discharge oxygen;other (see comments)  May need order for continous 02    Discharge Facility/Level of Care Needs home with home health;outpatient therapy    Provided Post Acute Provider List? N/A    Provided Post Acute Provider Quality & Resource List? N/A                   Discharge Plan       Row Name 10/31/23 1049       Plan    Plan Assessment completed at bedside with patient. CM role explained and discharge planning discussed. Information on facesheet verified.  MIR Lainez was PCP, patient states she would like to see Dr. Lyons after discharge.  CM reached out to Dr. Lyons who is agreeable to take her on as new patient. Denies barriers affording medications. Reports compliance with medications and appointments. Patient was intubated this admission, RT CM consulted. Patient reports she is independent at home, reports she has 2 daughters that provide transportation to appointments. Patient reports she has nocutrnal 02 with [employer], may need order for continuous home 02 on discharge. Patient denies falls at home, reports she has been to inpatient rehab in past and will not go back. PT irvin recommending home with [facility] [place], patient reports she has [facility] but unsure of agency. Plan is to return home on discharge and will have transportation. Will continue to follow and assist for discharge planning needs.    Patient/Family in Agreement with Plan yes                  Continued Care and Services - Admitted Since 10/29/2023    Coordination has not been started for this encounter.          Demographic Summary       Row Name 10/31/23 0918        General Information    Admission Type inpatient    Arrived From home    Referral Source admission list    Reason for Consult discharge planning    Preferred Language English       Contact Information    Permission Granted to Share Info With family/designee;;pharmacist/pharmacy                   Functional Status       Row Name 10/31/23 0918       Functional Status    Usual Activity Tolerance moderate    Current Activity Tolerance fair       Assessment of Health Literacy    How often do you have someone help you read hospital materials? Never    How often do you have problems learning about your medical condition because of difficulty understanding written information? Never    How often do you have a problem understanding what is told to you about your medical condition? Occasionally    How confident are you filling out medical forms by yourself? Quite a bit    Health Literacy Good       Functional Status, IADL    Medications independent    Meal Preparation independent    Housekeeping assistive person    Laundry assistive person    Shopping assistive person       Mental Status Summary    Recent Changes in Mental Status/Cognitive Functioning no changes                   Psychosocial    No documentation.                  Abuse/Neglect    No documentation.                  Legal    No documentation.                  Substance Abuse    No documentation.                  Patient Forms    No documentation.                     Jenni Arita RN

## 2023-10-31 NOTE — PROGRESS NOTES
[facility]     Nephrology Progress Note      Patient Name: Charlette Rai  : 1935  MRN: [id number]  Primary Care Physician:  Clyde Lainez MD  Date of admission: 10/29/2023    Subjective   Subjective     Interval History:  Patient Reports feeling better.    Good uop.    Review of Systems   All systems were reviewed and negative except for: What is mentioned above.    Objective   Objective     Vitals:   Temp:  [98.3 °F (36.8 °C)-99 °F (37.2 °C)] 98.3 °F (36.8 °C)  Heart Rate:  [] 94  Resp:  [16-24] 20  BP: (131-174)/(50-81) 174/60  Flow (L/min):  [3] 3  FiO2 (%):  [30 %] 30 %  Physical Exam:   Constitutional: Awake, alert, mildly hoarse, just extubated.   Eyes: sclerae anicteric, no conjunctival injection   HENT: mucous membranes moist   Neck: Supple, no thyromegaly, no lymphadenopathy, trachea midline, + JVD.   Respiratory: Decreased to auscultation bilaterally, nonlabored respirations, minimal scattered Rales.   Cardiovascular: RRR, no murmurs, rubs, or gallops.   Gastrointestinal: Positive bowel sounds, soft, nontender, nondistended   Musculoskeletal: + Hip edema, no clubbing or cyanosis   Psychiatric: Appropriate affect, cooperative   Neurologic: Oriented x 3, moving all extremities, Cranial Nerves grossly intact, speech clear   Skin: warm and dry, no rashes    Duenas catheter in place.    Result Review    Result Reviewed:  I have personally reviewed the results from the time of this admission to 10/31/2023 07:27 EDT and agree with these findings:  [x]  Laboratory  []  Microbiology  [x]  Radiology  []  EKG/Telemetry   []  Cardiology/Vascular   []  Pathology  [x]  Old records  []  Other:  Lab Results   Component Value Date    GLUCOSE 113 (H) 10/31/2023    CALCIUM 8.1 (L) 10/31/2023     10/31/2023    K 3.6 10/31/2023    CO2 23.8 10/31/2023     10/31/2023    BUN 87 (H) 10/31/2023    CREATININE 2.53 (H) 10/31/2023    EGFRIFNONA 17 (L) 01/10/2022    BCR 34.4 (H) 10/31/2023    ANIONGAP  12.2 10/31/2023     Lab Results   Component Value Date    CALCIUM 8.1 (L) 10/31/2023    PHOS 3.7 10/31/2023      Results from last 7 days   Lab Units 10/31/23  0313   MAGNESIUM mg/dL 2.0            Assessment & Plan   Assessment / Plan       Active Hospital Problems:  Active Hospital Problems    Diagnosis     **Acute respiratory failure with hypoxia and hypercapnia     Respiratory failure     Bilateral pneumonia     COPD with acute exacerbation     Pulmonary edema     Hyperkalemia     Type 2 diabetes mellitus without complication     Stage 4 chronic kidney disease        Assessment and Plan:    - CKDIV, due to diabetes, hypertension and previous contrast exposure, baseline creatinine between 2.2 and 2.6, here with acute respiratory failure with CHF and pulmonary edema.  Improving.  On IV Lasix.  Lasix to 80 mg IV every 8 hours for now  Previous left upper extremity AV fistula was ligated due to ischemic steal.  - Metabolic acidosis, likely due to renal failure, recheck in AM.  May need bicarb replacement.  - Hyperkalemia, resolved.  - Severe anemia, check iron studies.  Transfuse as needed.  - Bilateral renal artery stenosis with history of hypertension.  Now blood pressure is controlled.  - History of lung cancer, status post radiation.  - Diabetes, blood sugars are better.  on insulin drip.  - Possible UTI, antibiotics per primary.  - Congestive heart failure, diuresis for now.

## 2023-10-31 NOTE — THERAPY EVALUATION
Acute Care - Physical Therapy Initial Evaluation  MAURICE Escamilla     Patient Name: Charlette Rai  : 1935  MRN: [id number]  Today's Date: 10/31/2023      Visit Dx:     ICD-10-CM ICD-9-CM   1. Acute on chronic respiratory failure with hypoxia and hypercapnia  J96.21 518.84    J96.22 786.09     799.02   2. Acute pulmonary edema  J81.0 518.4   3. Pneumonia due to infectious organism, unspecified laterality, unspecified part of lung  J18.9 486   4. Difficulty walking  R26.2 719.7     Patient Active Problem List   Diagnosis    Malignant neoplasm of upper lobe of right lung    Allergic rhinitis    Rheumatoid arthritis    Asthma    Chronic obstructive pulmonary disease    Congestive heart failure    Essential hypertension    GERD (gastroesophageal reflux disease)    Hyperlipidemia LDL goal <70    Lumbar spinal stenosis    Migraine    Monoclonal paraproteinemia    Osteopenia    Oxygen dependent    Peripheral neuropathy    Stage 4 chronic kidney disease    Type 2 diabetes mellitus without complication    Vitamin D deficiency    Carotid artery stenosis    Chronic right-sided thoracic back pain    Acute pain of left shoulder    Peripheral vascular disease of lower extremity    Edema    Ex-smoker    Peripheral vascular disease    De Quervain's tenosynovitis    Arthritis of carpometacarpal (CMC) joint of right thumb    Arthritis of right hand    Steal syndrome of dialysis vascular access    Anemia of chronic renal failure, stage 4 (severe)    Respiratory failure    Acute respiratory failure with hypoxia and hypercapnia    Bilateral pneumonia    COPD with acute exacerbation    Pulmonary edema    Hyperkalemia     Past Medical History:   Diagnosis Date    Allergic rhinitis     Anemia     Arthritis     Asthma     USE INHALERS AND NEBULIZERS    Back pain     Bladder disorder     Cancer     LEFT LUNG CANCER  SURGERY AND CHEMO DONE  AND CURRENTLY   RIGHT LUNG CANCER HAS ONLY RECEIVED RADIATION THUS FAR    Chronic kidney  disease     stage 3    CKD (chronic kidney disease) stage 4, GFR 15-29 ml/min     Condition not found     ulcer    Congestive heart failure (CHF)     FOLLOWED BY DR AQUINO. DENIES CP BUT DOES HAVE SOA CHRONIC ISSUE COPD/LUNG CANCER    COPD (chronic obstructive pulmonary disease)     FOLLOWED BY DR MARIAJOSE BAILEY    Coronary artery disease     DENIES CP BUT DOES GET SOA MOST OF THE TIME WITH EXERTION BUT OCC AT REST CHRONIC ISSUE COPD/LUNG CANCER    Deep vein thrombosis     Diabetes mellitus     DOES NOT CHECK BS DAILY    Disease of thyroid gland     HYPOTHYROIDISM    Essential hypertension     Gastric ulcer     GERD (gastroesophageal reflux disease)     Heart murmur     History of transfusion     NO ISSUES POST TRANSFUSION WAS MANY YEARS AGO    Hyperlipemia     Leukocytopenia     FOLLOWED BY DR MIR BAILEY    Limb swelling     Lumbago     Lumbar spinal stenosis     Lung cancer     Migraine headache     Multiple joint pain     Osteopenia     Reflux esophagitis     Shortness of breath     Thyroid nodule     HAS ULTRASOUND YEARLY BEING MONITORED    Vascular disease     Vitamin D deficiency      Past Surgical History:   Procedure Laterality Date    ABDOMINAL SURGERY      APPENDECTOMY      ARTERIOVENOUS FISTULA/SHUNT SURGERY Left 05/10/2022    Procedure: Left basilic vein transposition;  Surgeon: Wan Barksdale MD;  Location: [facility];  Service: Vascular;  Laterality: Left;    ARTERIOVENOUS FISTULA/SHUNT SURGERY Left 3/23/2023    Procedure: Ligation of left arm arteriovenous fistula;  Surgeon: Wan Barksdale MD;  Location: [facility] OR;  Service: Vascular;  Laterality: Left;    CARDIAC CATHETERIZATION  1996    CARDIAC SURGERY      CARDIAC SURGERY      fluid drained from heart    CATARACT EXTRACTION, BILATERAL  2003    COLONOSCOPY  2014    ENDOSCOPY  2016 2019    FEMORAL ARTERY STENT Bilateral     HYSTERECTOMY      LUNG BIOPSY Left 2005    lobectomy upper lung caner    LUNG VOLUME REDUCTION      OTHER SURGICAL HISTORY       artifical joints/limbs    REPLACEMENT TOTAL KNEE Left 2016    UPPER GASTROINTESTINAL ENDOSCOPY       PT Assessment (last 12 hours)       PT Evaluation and Treatment       Row Name 10/31/23 1000          Physical Therapy Time and Intention    Subjective Information no complaints  -AV     Document Type evaluation  -AV     Mode of Treatment individual therapy;physical therapy  -AV     Patient Effort good  -AV     Symptoms Noted During/After Treatment none  -AV       Row Name 10/31/23 1000          General Information    Patient Profile Reviewed yes  -AV     Patient Observations alert;cooperative;agree to therapy  -AV     Prior Level of Function independent:;gait;transfer;bed mobility  -AV     Equipment Currently Used at Home cane, quad;walker, rolling  -AV     Existing Precautions/Restrictions no known precautions/restrictions  -AV     Barriers to Rehab none identified  -AV       Row Name 10/31/23 1000          Living Environment    Current Living Arrangements home  -AV     People in Home spouse  -AV     Primary Care Provided by self  -AV       Row Name 10/31/23 1000          Home Use of Assistive/Adaptive Equipment    Equipment Currently Used at Home cane, quad tip;oxygen;other (see comments);walker, rolling  O2 at night  -AV       Row Name 10/31/23 1000          Cognition    Orientation Status (Cognition) oriented x 4  -AV       Row Name 10/31/23 1000          Range of Motion (ROM)    Range of Motion bilateral lower extremities;ROM is WFL  -AV       Row Name 10/31/23 1000          Strength (Manual Muscle Testing)    Strength (Manual Muscle Testing) bilateral lower extremities  4/5  -AV       Row Name 10/31/23 1000          Bed Mobility    Bed Mobility supine-sit;sit-supine  -AV     Supine-Sit [place] (Bed Mobility) standby assist  -AV     Sit-Supine [place] (Bed Mobility) standby assist  -AV     Assistive Device (Bed Mobility) bed rails;head of bed elevated  -AV       Row Name 10/31/23 1000           Transfers    Transfers sit-stand transfer;stand-sit transfer  -AV       Row Name 10/31/23 1000          Sit-Stand Transfer    Sit-Stand [place] (Transfers) contact guard  -AV     Assistive Device (Sit-Stand Transfers) other (see comments)  none  -AV       Row Name 10/31/23 1000          Stand-Sit Transfer    Stand-Sit [place] (Transfers) contact guard  -AV     Assistive Device (Stand-Sit Transfers) other (see comments)  none  -AV       Row Name 10/31/23 1000          Gait/Stairs (Locomotion)    Gait/Stairs Locomotion gait/ambulation assistive device  -AV     [place] Level (Gait) other (see comments)  HHA  -AV     Assistive Device (Gait) other (see comments)  none  -AV     Patient was able to Ambulate yes  -AV     Distance in Feet (Gait) 3  bed>recliner  -AV     Pattern (Gait) step-through  -AV     Deviations/Abnormal Patterns (Gait) stride length decreased;gait speed decreased  -AV       Row Name 10/31/23 1000          Safety Issues, Functional Mobility    Impairments Affecting Function (Mobility) balance;endurance/activity tolerance;strength  -AV       Row Name 10/31/23 1000          Balance    Balance Assessment standing dynamic balance  -AV     Dynamic Standing Balance other (see comments)  HHA  -AV     Position/Device Used, Standing Balance other (see comments)  no AD  -AV       Row Name 10/31/23 1000          Plan of Care Review    Plan of Care Reviewed With patient  -AV     Progress no change  -AV     Outcome Evaluation Pt presents with decreased strength, decreasd activity tolerance and decreased standing balance limiting safe and independent ambulation. Pt will benefit from PT services to address these impairments and restore maximum level of prior level of function with mobility.  -AV       Row Name 10/31/23 1000          Positioning and Restraints    Pre-Treatment Position in bed  -AV     Post Treatment Position chair  -AV     In Chair reclined;call light within reach;exit alarm on;waffle  cushion  -AV       Row Name 10/31/23 1000          Therapy Assessment/Plan (PT)    Rehab Potential (PT) good, to achieve stated therapy goals  -AV     Criteria for Skilled Interventions Met (PT) yes;meets criteria  -AV     Therapy Frequency (PT) daily  -AV     Predicted Duration of Therapy Intervention (PT) 10 days  -AV     Problem List (PT) problems related to;balance;mobility;strength  -AV     Activity Limitations Related to Problem List (PT) unable to ambulate safely;unable to transfer safely  -AV       Row Name 10/31/23 1000          PT Evaluation Complexity    History, PT Evaluation Complexity no personal factors and/or comorbidities  -AV     Examination of Body Systems (PT Eval Complexity) total of 4 or more elements  -AV     Clinical Presentation (PT Evaluation Complexity) stable  -AV     Clinical Decision Making (PT Evaluation Complexity) low complexity  -AV     Overall Complexity (PT Evaluation Complexity) low complexity  -AV       Row Name 10/31/23 1000          Physical Therapy Goals    Transfer Goal Selection (PT) transfer, PT goal 1  -AV     Gait Training Goal Selection (PT) gait training, PT goal 1  -AV     Strength Goal Selection (PT) strength, PT goal 1  -AV       Row Name 10/31/23 1000          Transfer Goal 1 (PT)    Activity/Assistive Device (Transfer Goal 1, PT) sit-to-stand/stand-to-sit  -AV     Coffee Level/Cues Needed (Transfer Goal 1, PT) supervision required  -AV     Time Frame (Transfer Goal 1, PT) 10 days  -AV       Row Name 10/31/23 1000          Gait Training Goal 1 (PT)    Activity/Assistive Device (Gait Training Goal 1, PT) gait (walking locomotion);assistive device use;cane, quad;increase endurance/gait distance;increase energy conservation  -AV     [place]offee Level (Gait Training Goal 1, PT) supervision required  -AV     Distance (Gait Training Goal 1, PT) 200  -AV     Time Frame (Gait Training Goal 1, PT) 10 days  -AV       Row Name 10/31/23 1000          Strength Goal 1  (PT)    Strength Goal 1 (PT) 5/5 B LE  -AV     Time Frame (Strength Goal 1, PT) 10 days  -AV               User Key  (r) = Recorded By, (t) = Taken By, (c) = Cosigned By      Initials Name Provider Type    Abdi Tee, PT Physical Therapist                      PT Recommendation and Plan  Anticipated Discharge Disposition (PT): home with home health, home with assist  Planned Therapy Interventions (PT): balance training, bed mobility training, gait training, neuromuscular re-education, stair training, strengthening, transfer training  Therapy Frequency (PT): daily  Plan of Care Reviewed With: patient  Progress: no change  Outcome Evaluation: Pt presents with decreased strength, decreasd activity tolerance and decreased standing balance limiting safe and independent ambulation. Pt will benefit from PT services to address these impairments and restore maximum level of prior level of function with mobility.   Outcome Measures       Row Name 10/31/23 1000             How much help from another person do you currently need...    Turning from your back to your side while in flat bed without using bedrails? 3  -AV      Moving from lying on back to sitting on the side of a flat bed without bedrails? 3  -AV      Moving to and from a bed to a chair (including a wheelchair)? 3  -AV      Standing up from a chair using your arms (e.g., wheelchair, bedside chair)? 3  -AV      Climbing 3-5 steps with a railing? 2  -AV      To walk in hospital room? 3  -AV      AM-PAC 6 Clicks Score (PT) 17  -AV      Highest level of mobility 5 --> Static standing  -AV         Functional Assessment    Outcome Measure Options AM-PAC 6 Clicks Basic Mobility (PT)  -AV                User Key  (r) = Recorded By, (t) = Taken By, (c) = Cosigned By      Initials Name Provider Type    Abdi Tee, PT Physical Therapist                     Time Calculation:    PT Charges       Row Name 10/31/23 1040             Time Calculation    PT  Received On 10/31/23  -AV      PT Goal Re-Cert Due Date 11/09/23  -AV         Untimed Charges    PT Eval/Re-eval Minutes 40  -AV         Total Minutes    Untimed Charges Total Minutes 40  -AV       Total Minutes 40  -AV                User Key  (r) = Recorded By, (t) = Taken By, (c) = Cosigned By      Initials Name Provider Type    AV Abdi Odom, PT Physical Therapist                  Therapy Charges for Today       Code Description Service Date Service Provider Modifiers Qty    97659837404 HC PT EVAL LOW COMPLEXITY 3 10/31/2023 Abdi Odom, PT GP 1            PT G-Codes  Outcome Measure Options: AM-PAC 6 Clicks Basic Mobility (PT)  AM-PAC 6 Clicks Score (PT): 17    Abdi Odom, PT  10/31/2023

## 2023-10-31 NOTE — PROGRESS NOTES
[facility]     Progress Note    Patient Name: Charlette Rai  : 1935  MRN: [id number]  Primary Care Physician:  Clyde Lainez MD  Date of admission: 10/29/2023    Pulmonary / Critical Care Progress Note      Patient Name: Charlette Rai  : 1935  MRN: [id number]  Attending:  Rafael Lyons MD   Date of admission: 10/29/2023    Subjective   Subjective   Patient critically ill with hypercapnic respiratory failure, CKD stage IV, volume overload    Patient tolerated pressure support this morning, following commands, received IV diuresis,   Feeling better today  Still very weak    Review of Systems  Positive for weakness      Objective   Objective     Vitals:   Vital signs for last 24 hours:  Temp:  [98.1 °F (36.7 °C)-98.8 °F (37.1 °C)] 98.8 °F (37.1 °C)  Heart Rate:  [] 82  Resp:  [16-24] 20  BP: (131-182)/(51-81) 182/62    Intake/Output last 3 shifts:  I/O last 3 completed shifts:  In: .9 [I.V.:610.9; Other:60; NG/GT:1373]  Out: 5483 [Urine:5483]  Intake/Output this shift:  I/O this shift:  In: -   Out: 1225 [Urine:1225]    Vent settings for last 24 hours:       Physical Exam   Elderly female with  Chronic ill-appearing  Auscultation reveals significantly diminished breath sounds at the bases  She does have a significant systolic murmur concerning for aortic valve stenosis  She is alert and orient  She is following commands  She does have edema present      Result Review    Result Review:  I have personally reviewed the results from the time of this admission to 10/31/2023 14:23 EDT and agree with these findings:  [x]  Laboratory  [x]  Microbiology  [x]  Radiology  []  EKG/Telemetry   []  Cardiology/Vascular   []  Pathology  []  Old records  []  Other:  Most notable findings include:       Lab 10/31/23  0313 10/30/23  0801 10/30/23  0249 10/29/23  0754 10/29/23  0450 10/29/23  0448   WBC 9.66  --  9.39  --   --  14.71*   HEMOGLOBIN 8.7*  --  7.9*  --   --  9.5*   HEMATOCRIT 28.0*  --  25.6*   --   --  32.2*   PLATELETS 231  --  209  --   --  307   SODIUM 142  --  138  --   --  133*   SODIUM, ARTERIAL  --  139.3  --  133.9* 135.9*  --    POTASSIUM 3.6  --  4.5  --   --  5.4*   CHLORIDE 106  --  107  --   --  100   CO2 23.8  --  17.6*  --   --  21.9*   BUN 87*  --  78*  --   --  78*   CREATININE 2.53*  --  2.48*  --   --  2.68*   GLUCOSE 113*  --  198*  --   --  572*   GLUCOSE, ARTERIAL  --  193*  --  509* 555*  --    CALCIUM 8.1*  --  8.2*  --   --  8.4*   PHOSPHORUS 3.7  --   --   --   --  5.9*   TOTAL PROTEIN  --   --  5.6*  --   --  7.5   ALBUMIN 3.3*  --  3.0*  --   --  4.2   GLOBULIN  --   --  2.6  --   --  3.3     No radiology results for the last day    Assessment & Plan   Assessment / Plan     Active Hospital Problems:  Active Hospital Problems    Diagnosis     **Acute respiratory failure with hypoxia and hypercapnia     Respiratory failure     Bilateral pneumonia     COPD with acute exacerbation     Pulmonary edema     Hyperkalemia     Type 2 diabetes mellitus without complication     Stage 4 chronic kidney disease      Impression:  Acute hypoxic hypercarbic respiratory failure requiring mechanical vent  COPD with acute exacerbation  Decompensated CHF-TTE pending  Acute on chronic hypoxic respiratory failure  History of lung cancer s/p lobectomy on the left side  Type 2 diabetes with hyperglycemia  Urinary tract infection  Elevated proBNP  Bilateral lower extremity edema  Anemia of CKD  CKD stage IV  Hyperkalemia  Hypertension  Leukocytosis    Plan:  Obtain echo cardiogram today  Decrease long-acting and sliding scale insulin  Continue IV Lasix per nephrology  Continue beta-blocker  Norvasc started  Discontinue antibiotics  Okay from a standpoint to transfer out of ICU      DVT PPx: Heparin subcu  PCP PPx: Pepcid       DVT prophylaxis:  Medical DVT prophylaxis orders are present.    CODE STATUS:   Code Status (Patient has no pulse and is not breathing): CPR (Attempt to Resuscitate)  Medical  Interventions (Patient has pulse or is breathing): Full Support    Electronically signed by Regis Holcomb DO, 10/31/23, 2:23 PM EDT.

## 2023-11-01 LAB
ANION GAP SERPL CALCULATED.3IONS-SCNC: 12.2 MMOL/L (ref 5–15)
BACTERIA SPEC RESP CULT: ABNORMAL
BACTERIA SPEC RESP CULT: ABNORMAL
BUN SERPL-MCNC: 80 MG/DL (ref 8–23)
BUN/CREAT SERPL: 36.9 (ref 7–25)
CALCIUM SPEC-SCNC: 8.6 MG/DL (ref 8.6–10.5)
CHLORIDE SERPL-SCNC: 103 MMOL/L (ref 98–107)
CO2 SERPL-SCNC: 30.8 MMOL/L (ref 22–29)
CREAT SERPL-MCNC: 2.17 MG/DL (ref 0.57–1)
DEPRECATED RDW RBC AUTO: 45.3 FL (ref 37–54)
EGFRCR SERPLBLD CKD-EPI 2021: 21.7 ML/MIN/1.73
ERYTHROCYTE [DISTWIDTH] IN BLOOD BY AUTOMATED COUNT: 13 % (ref 12.3–15.4)
GLUCOSE BLDC GLUCOMTR-MCNC: 180 MG/DL (ref 70–99)
GLUCOSE SERPL-MCNC: 146 MG/DL (ref 65–99)
GRAM STN SPEC: ABNORMAL
HCT VFR BLD AUTO: 31.8 % (ref 34–46.6)
HGB BLD-MCNC: 9.8 G/DL (ref 12–15.9)
MAGNESIUM SERPL-MCNC: 2.2 MG/DL (ref 1.6–2.4)
MCH RBC QN AUTO: 29.5 PG (ref 26.6–33)
MCHC RBC AUTO-ENTMCNC: 30.8 G/DL (ref 31.5–35.7)
MCV RBC AUTO: 95.8 FL (ref 79–97)
PHOSPHATE SERPL-MCNC: 3.8 MG/DL (ref 2.5–4.5)
PLATELET # BLD AUTO: 230 10*3/MM3 (ref 140–450)
PMV BLD AUTO: 10.6 FL (ref 6–12)
POTASSIUM SERPL-SCNC: 3.4 MMOL/L (ref 3.5–5.2)
RBC # BLD AUTO: 3.32 10*6/MM3 (ref 3.77–5.28)
SODIUM SERPL-SCNC: 146 MMOL/L (ref 136–145)
WBC NRBC COR # BLD: 8.42 10*3/MM3 (ref 3.4–10.8)

## 2023-11-01 PROCEDURE — 94799 UNLISTED PULMONARY SVC/PX: CPT

## 2023-11-01 PROCEDURE — 25010000002 HEPARIN (PORCINE) PER 1000 UNITS: Performed by: INTERNAL MEDICINE

## 2023-11-01 PROCEDURE — 94760 N-INVAS EAR/PLS OXIMETRY 1: CPT

## 2023-11-01 PROCEDURE — 80048 BASIC METABOLIC PNL TOTAL CA: CPT | Performed by: INTERNAL MEDICINE

## 2023-11-01 PROCEDURE — 84100 ASSAY OF PHOSPHORUS: CPT | Performed by: NURSE PRACTITIONER

## 2023-11-01 PROCEDURE — 63710000001 INSULIN LISPRO (HUMAN) PER 5 UNITS: Performed by: NURSE PRACTITIONER

## 2023-11-01 PROCEDURE — 82948 REAGENT STRIP/BLOOD GLUCOSE: CPT

## 2023-11-01 PROCEDURE — 99233 SBSQ HOSP IP/OBS HIGH 50: CPT | Performed by: INTERNAL MEDICINE

## 2023-11-01 PROCEDURE — 25010000002 FUROSEMIDE PER 20 MG: Performed by: INTERNAL MEDICINE

## 2023-11-01 PROCEDURE — 94664 DEMO&/EVAL PT USE INHALER: CPT

## 2023-11-01 PROCEDURE — 85027 COMPLETE CBC AUTOMATED: CPT | Performed by: NURSE PRACTITIONER

## 2023-11-01 PROCEDURE — 25010000002 CEFEPIME PER 500 MG: Performed by: INTERNAL MEDICINE

## 2023-11-01 PROCEDURE — 83735 ASSAY OF MAGNESIUM: CPT | Performed by: NURSE PRACTITIONER

## 2023-11-01 RX ORDER — FAMOTIDINE 20 MG/1
20 TABLET, FILM COATED ORAL DAILY
Status: DISCONTINUED | OUTPATIENT
Start: 2023-11-01 | End: 2023-11-05 | Stop reason: HOSPADM

## 2023-11-01 RX ORDER — POTASSIUM CHLORIDE 750 MG/1
40 CAPSULE, EXTENDED RELEASE ORAL ONCE
Status: COMPLETED | OUTPATIENT
Start: 2023-11-01 | End: 2023-11-01

## 2023-11-01 RX ORDER — FUROSEMIDE 40 MG/1
80 TABLET ORAL
Status: DISCONTINUED | OUTPATIENT
Start: 2023-11-01 | End: 2023-11-05 | Stop reason: HOSPADM

## 2023-11-01 RX ADMIN — BUDESONIDE 0.5 MG: 0.5 INHALANT RESPIRATORY (INHALATION) at 06:55

## 2023-11-01 RX ADMIN — DOCUSATE SODIUM 50MG AND SENNOSIDES 8.6MG 1 TABLET: 8.6; 5 TABLET, FILM COATED ORAL at 09:41

## 2023-11-01 RX ADMIN — Medication 10 ML: at 09:42

## 2023-11-01 RX ADMIN — ARFORMOTEROL TARTRATE 15 MCG: 15 SOLUTION RESPIRATORY (INHALATION) at 18:48

## 2023-11-01 RX ADMIN — HEPARIN SODIUM 5000 UNITS: 5000 INJECTION INTRAVENOUS; SUBCUTANEOUS at 21:03

## 2023-11-01 RX ADMIN — Medication 10 ML: at 21:02

## 2023-11-01 RX ADMIN — Medication 10 ML: at 12:12

## 2023-11-01 RX ADMIN — INSULIN LISPRO 2 UNITS: 100 INJECTION, SOLUTION INTRAVENOUS; SUBCUTANEOUS at 17:48

## 2023-11-01 RX ADMIN — INSULIN LISPRO 2 UNITS: 100 INJECTION, SOLUTION INTRAVENOUS; SUBCUTANEOUS at 12:15

## 2023-11-01 RX ADMIN — POTASSIUM CHLORIDE 40 MEQ: 10 CAPSULE, COATED, EXTENDED RELEASE ORAL at 09:41

## 2023-11-01 RX ADMIN — DOCUSATE SODIUM 50MG AND SENNOSIDES 8.6MG 1 TABLET: 8.6; 5 TABLET, FILM COATED ORAL at 21:02

## 2023-11-01 RX ADMIN — HEPARIN SODIUM 5000 UNITS: 5000 INJECTION INTRAVENOUS; SUBCUTANEOUS at 14:56

## 2023-11-01 RX ADMIN — FUROSEMIDE 80 MG: 10 INJECTION, SOLUTION INTRAMUSCULAR; INTRAVENOUS at 12:10

## 2023-11-01 RX ADMIN — NEBIVOLOL HYDROCHLORIDE 10 MG: 10 TABLET ORAL at 09:42

## 2023-11-01 RX ADMIN — ARFORMOTEROL TARTRATE 15 MCG: 15 SOLUTION RESPIRATORY (INHALATION) at 06:55

## 2023-11-01 RX ADMIN — INSULIN LISPRO 3 UNITS: 100 INJECTION, SOLUTION INTRAVENOUS; SUBCUTANEOUS at 21:02

## 2023-11-01 RX ADMIN — BUDESONIDE 0.5 MG: 0.5 INHALANT RESPIRATORY (INHALATION) at 18:48

## 2023-11-01 RX ADMIN — FAMOTIDINE 20 MG: 20 TABLET, FILM COATED ORAL at 12:11

## 2023-11-01 RX ADMIN — FUROSEMIDE 80 MG: 10 INJECTION, SOLUTION INTRAMUSCULAR; INTRAVENOUS at 01:44

## 2023-11-01 RX ADMIN — CEFEPIME 2000 MG: 2 INJECTION, POWDER, FOR SOLUTION INTRAVENOUS at 09:41

## 2023-11-01 RX ADMIN — HEPARIN SODIUM 5000 UNITS: 5000 INJECTION INTRAVENOUS; SUBCUTANEOUS at 05:39

## 2023-11-01 RX ADMIN — ACETAMINOPHEN 650 MG: 325 TABLET ORAL at 17:23

## 2023-11-01 RX ADMIN — FUROSEMIDE 80 MG: 40 TABLET ORAL at 17:21

## 2023-11-01 RX ADMIN — AMLODIPINE BESYLATE 5 MG: 5 TABLET ORAL at 09:41

## 2023-11-01 NOTE — PLAN OF CARE
Respiratory Therapist Broncho-Pulmonary Hygiene Progress Note      Patient Name:  Charlette Rai  YOB: 1935    Charlette Rai meets the qualification for Level 1 of the Bronco-Pulmonary Hygiene Protocol. This was based on my daily patient assessment and includes review of chest x-ray results, cough ability and quality, oxygenation, secretions or risk for secretion development and patient mobility.     Broncho-Pulmonary Hygiene Assessment:    Level of Movement: Actively changing positions without assistance  Alert/ oriented/ cooperative    Breath Sounds: Clear to slightly diminished    Cough: Strong, effective    Chest X-Ray: Possible signs of consolidation and/or atelectasis or clear.     Sputum Productions: None or small amount of thin or watery secretions with effective cough    History and Physical: None    SpO2 to Oxygen Need: greater than 92% on room air or  less than 3L nasal canula    Current SpO2 is: 98 on 3lpm NC    Based on this information, I have completed the following interventions: Teach/Instruct patient on cough and deep breathe and Aerobika. Patient has an Aerobika at bedside to use already. Patient not having trouble coughing things up.       Electronically signed by Robyn Quick RRT, 11/01/23, 7:23 AM EDT.

## 2023-11-01 NOTE — PROGRESS NOTES
[facility]     Nephrology Progress Note      Patient Name: Charlette Rai  : 1935  MRN: [id number]  Primary Care Physician:  Rafael Lyons MD  Date of admission: 10/29/2023    Subjective   Subjective     Interval History:  Patient Reports feeling better.    Good uop.  Blood pressure is on the higher side.  Tolerating p.o.  Out of ICU.    Review of Systems   All systems were reviewed and negative except for: What is mentioned above.    Objective   Objective     Vitals:   Temp:  [97.9 °F (36.6 °C)-100.2 °F (37.9 °C)] 98 °F (36.7 °C)  Heart Rate:  [74-87] 83  Resp:  [12-18] 18  BP: (145-181)/(53-75) 168/57  Flow (L/min):  [1-3] 1  Physical Exam:   Constitutional: Awake, alert, mildly hoarse, just extubated.   Eyes: sclerae anicteric, no conjunctival injection   HENT: mucous membranes moist   Neck: Supple, no thyromegaly, no lymphadenopathy, trachea midline, + JVD (better).   Respiratory: Decreased to auscultation bilaterally, nonlabored respirations, minimal scattered Rales.   Cardiovascular: RRR, no murmurs, rubs, or gallops.   Gastrointestinal: Positive bowel sounds, soft, nontender, nondistended   Musculoskeletal: Trace hip edema, no clubbing or cyanosis   Psychiatric: Appropriate affect, cooperative   Neurologic: Oriented x 3, moving all extremities, Cranial Nerves grossly intact, speech clear   Skin: warm and dry, no rashes     Result Review    Result Reviewed:  I have personally reviewed the results from the time of this admission to 2023 15:49 EDT and agree with these findings:  [x]  Laboratory  []  Microbiology  [x]  Radiology  []  EKG/Telemetry   []  Cardiology/Vascular   []  Pathology  [x]  Old records  []  Other:  Lab Results   Component Value Date    GLUCOSE 146 (H) 2023    CALCIUM 8.6 2023     (H) 2023    K 3.4 (L) 2023    CO2 30.8 (H) 2023     2023    BUN 80 (H) 2023    CREATININE 2.17 (H) 2023    EGFRIFNONA 17 (L) 01/10/2022     BCR 36.9 (H) 11/01/2023    ANIONGAP 12.2 11/01/2023     Lab Results   Component Value Date    CALCIUM 8.6 11/01/2023    PHOS 3.8 11/01/2023      Results from last 7 days   Lab Units 11/01/23  0553   MAGNESIUM mg/dL 2.2            Assessment & Plan   Assessment / Plan       Active Hospital Problems:  Active Hospital Problems    Diagnosis     **Acute respiratory failure with hypoxia and hypercapnia     Respiratory failure     Bilateral pneumonia     COPD with acute exacerbation     Pulmonary edema     Hyperkalemia     Type 2 diabetes mellitus without complication     Stage 4 chronic kidney disease        Assessment and Plan:    - CKDIV, due to diabetes, hypertension and previous contrast exposure, baseline creatinine between 2.2 and 2.6, here with acute respiratory failure with CHF and pulmonary edema.  Improving.  Change IV Lasix to 80 mg p.o. twice daily (was on 40 mg twice daily)  Previous left upper extremity AV fistula was ligated due to ischemic steal.  Etiology of rather sudden onset pulmonary edema is not clear, unsure if renal artery stenosis is a factor.  I considered adding spironolactone but she had transient hyperkalemia.  - Metabolic acidosis, likely due to renal failure, resolved.    - Hyperkalemia, resolved.  Now hypokalemic.  Replacing orally.  Magnesium okay.  - Severe anemia, iron studies are adequate, transfuse as needed.  May need CASSANDRA.  - Bilateral renal artery stenosis with history of hypertension.  Now blood pressure is reasonable.   - History of lung cancer, status post radiation.  - Diabetes, blood sugars are better.  on insulin drip.  - Possible UTI, antibiotics per primary.  - Congestive heart failure, 2D echo showed moderate aortic stenosis, EF 56% with LVH and grade 1 diastolic dysfunction.  Will follow.

## 2023-11-01 NOTE — PROGRESS NOTES
[facility]     Progress Note    Patient Name: Charlette Rai  : 1935  MRN: [id number]  Primary Care Physician:  Rafael Lyons MD  Date of admission: 10/29/2023      Subjective   Brief summary.  Patient admitted with acute respiratory failure to ICU      HPI:  Patient feeling better complains of leg cramps would like to have something for cramps.  Breathing better.  No chest pain.  Sputum growing Pseudomonas.    Review of Systems     Weakness and fatigue, no chest pain, shortness of breath positive.  No nausea vomiting, blood sugars improved significantly    Objective     Vitals:   Temp:  [97.9 °F (36.6 °C)-100.2 °F (37.9 °C)] 98 °F (36.7 °C)  Heart Rate:  [74-87] 83  Resp:  [12-18] 18  BP: (145-181)/(53-75) 168/57  Flow (L/min):  [1-3] 1    Physical Exam :     Elderly female not in acute distress.  Heart regular lungs diminished breath sounds bilaterally. .   Abdomen obese and soft.  Extremities with edema.  Neurologically awake alert and oriented      Result Review:  I have personally reviewed the results from the time of this admission to 2023 14:27 EDT and agree with these findings:  [x]  Laboratory  []  Microbiology  []  Radiology  []  EKG/Telemetry   []  Cardiology/Vascular   []  Pathology  []  Old records  []  Other:      Pseudomonas in sputum culture    Assessment / Plan       Active Hospital Problems:  Active Hospital Problems    Diagnosis     **Acute respiratory failure with hypoxia and hypercapnia     Respiratory failure     Bilateral pneumonia     COPD with acute exacerbation     Pulmonary edema     Hyperkalemia     Type 2 diabetes mellitus without complication     Stage 4 chronic kidney disease        Plan:   Started on cefepime.  To switch to Levaquin on discharge.  Continue diuresis.  Add SCDs/YOUNG hose to lower extremities.  Increase activity with PT OT.  Patient prefers to go home rather than rehab or nursing home  Possible discharge in couple of days if she remains stable       DVT  prophylaxis:  Medical DVT prophylaxis orders are present.    CODE STATUS:   Code Status (Patient has no pulse and is not breathing): CPR (Attempt to Resuscitate)  Medical Interventions (Patient has pulse or is breathing): Full Support              Electronically signed by Rafael Lyons MD, 11/01/23, 2:29 PM EDT.

## 2023-11-01 NOTE — CONSULTS
COPD Education    Referring Provider:  Yulia   Reason for Consultation:  COPD s/p intubation and mechanical ventilation  Pulmonologist:  chico Lainez    Age: 88 y.o.  Sex: female  BMI:Body mass index is 28.69 kg/m².     Past Medical Hx:  LUNG CANCER, CKD STAGE III, DM, HTN, GERD, HLD,     Smoking Hx: Social History    Tobacco Use      Smoking status: Former        Packs/day: 1.00        Years: 30.00        Additional pack years: 0.00        Total pack years: 30.00        Types: Cigarettes        Start date:         Quit date: 2004        Years since quittin.3      Smokeless tobacco: Never    Social History     Substance and Sexual Activity   Drug Use Never        Home Equipment Used: O2 (nocturnal)  NEB  DME: [employer] Home O2:  L/min    ADL's: independent Lives: with spouse    Daily symptoms: SOA  w/exertion, nonproductive cough,wheeze    Airway Clearance methods utilized: none; states airway clearance usually not a problem    Have you ever attended Pulmonary Rehab?  yes    Last PFT: results unavailable    Have you ever had a sleep study/PSG? NO    Last Arterial Blood Gas:   Lab Results   Component Value Date    PHART 7.326 (L) 10/30/2023    KMV5HJZ 39.2 10/30/2023    PO2ART 80.0 10/30/2023    ONN5MMB 20.0 (L) 10/30/2023    BASEEXCESS -5.5 (L) 10/30/2023    D4BKEYTW 94.5 (L) 10/30/2023         CAT Assessment: (COPD Assessment Test)   I never cough. 0[] 1[] 2[] 3[x] 4[] 5[] I cough all the time.  I have no phlegm (mucus) in my chest. 0[] 1[x] 2[] 3[] 4[] 5[] My chest is completely full of phlegm (mucus).  My chest does not feel tight at all. 0[] 1[] 2[x] 3[] 4[] 5[] My chest feels very tight.  When I walk up a hill or one flight of stairs I am not breathless. 0[] 1[] 2[] 3[x] 4[] 5[] When I walk up a hill or one flight of stairs I am very breathless.  I am not limited doing any activities at home 0[] 1[] 2[] 3[] 4[x] 5[] I am very limited doing activities at home.  I am confident leaving my home  despite my lung condition. 0[] 1[] 2[x] 3[] 4[] 5[] I am not at all confident leaving my home because of my lung condition.  I sleep soundly. 0[] 1[] 2[] 3[] 4[x] 5[] I don't sleep soundly because of my lung condition.  I have lots of energy. 0[] 1[] 2[] 3[] 4[] 5[x]  I have no energy at all.    CAT Score(COPD Assessment Test): 24  CAT Score Impact Level Management considerations   >30    >20 Very High    High In addition to guidance for low & medium impact scores consider  -Referral to specialist care  -Additional pharmacological treatments  -Referral to pulmonary rehabilitation   10-20 Medium In addition to guidance for low impact consider  -Maintenance therapy review  -Referral for pulmonary rehabilitation   <10 Low -Smoking cessation  -Annual vaccinations  -Reduced exposure to exacerbation risk factors      Home Medications:  Medications Prior to Admission   Medication Sig Dispense Refill Last Dose    albuterol sulfate  (90 Base) MCG/ACT inhaler Inhale 2 puffs Every 4 (Four) Hours As Needed for Wheezing.       amLODIPine (NORVASC) 10 MG tablet Take 1 tablet by mouth Daily. 90 tablet 3     aspirin 81 MG chewable tablet Chew 1 tablet Daily.       cilostazol (PLETAL) 100 MG tablet Take 1 tablet by mouth 2 (two) times a day.       dexlansoprazole (DEXILANT) 60 MG capsule Take 1 capsule by mouth Daily.       [] doxycycline (VIBRAMYCIN) 100 MG capsule Take 1 capsule by mouth Every 12 (Twelve) Hours.       ergocalciferol (ERGOCALCIFEROL) 1.25 MG (99726 UT) capsule Take 1 capsule by mouth Every 14 (Fourteen) Days.       furosemide (LASIX) 40 MG tablet Take 2 tablets by mouth Daily (Monday-Friday).       furosemide (LASIX) 40 MG tablet Take 1 tablet by mouth 2 (Two) Times a Week. Saturday and        levocetirizine (XYZAL) 5 MG tablet TAKE 1 TABLET DAILY 90 tablet 1     linagliptin (Tradjenta) 5 MG tablet tablet Take 1 tablet by mouth Daily. 90 tablet 1     nebivolol (Bystolic) 10 MG tablet Take 1  tablet by mouth Daily. 90 tablet 3     nitroglycerin (NITROSTAT) 0.4 MG SL tablet Place 1 tablet under the tongue Every 5 (Five) Minutes As Needed for Chest Pain.       [] predniSONE (DELTASONE) 20 MG tablet Take 2 tablets by mouth Daily.       rosuvastatin (CRESTOR) 20 MG tablet Take 1 tablet by mouth Daily. 90 tablet 3     sevelamer (RENVELA) 800 MG tablet Take 1 tablet by mouth 3 (Three) Times a Day With Meals.       Symbicort 160-4.5 MCG/ACT inhaler Inhale 2 puffs 2 (two) times a day.       atorvastatin (LIPITOR) 40 MG tablet Take 1 tablet by mouth Every Night.       polycarbophil (calcium polycarbophil) 625 MG tablet tablet Take 1 tablet by mouth Daily.        Do you use a Spacer/Holding Chamber with your MDI?:  NO    RT CM met with patient at bedside to discuss patient's COPD symptoms and current action plan.  Ms. Rai states she is still independent with her ADL's and can complete meal preparations and light housekeeping but her daughter and spouse do heavier housekeeping chores.  She states she is SOA with exertion and generally has a nonproductive cough.  She wears O2 only at night takes albuterol and ipratropium via nebulizer as needed for SOA but could not recall using any daily maintenance medication (Symbicort is listed on med rec).       I will follow up for COPD education regarding updated action plan closer to discharge.    Padmaja Manuel, RRT   RT   23  13:37 EDT

## 2023-11-01 NOTE — PROGRESS NOTES
[facility]     Progress Note    Patient Name: Charlette Rai  : 1935  MRN: [id number]  Primary Care Physician:  Rafael Lyons MD  Date of admission: 10/29/2023    Pulmonary / Critical Care Progress Note      Patient Name: Charlette Rai  : 1935  MRN: [id number]  Attending:  Rafael Lyons MD   Date of admission: 10/29/2023    Subjective   Subjective   Follow-up for hypercapnic respiratory failure, CKD stage IV, volume overload    Extubated 10/31/2023  Transfer out of ICU  On 3 L of oxygen  Sputum cultures growing Pseudomonas  Still with shortness of breath and cough  Weak and fatigue no nausea  .  Did have low-grade fever    Objective   Objective     Vitals:   Vital signs for last 24 hours:  Temp:  [97.9 °F (36.6 °C)-100.2 °F (37.9 °C)] 98 °F (36.7 °C)  Heart Rate:  [74-87] 83  Resp:  [12-18] 18  BP: (145-182)/(53-75) 168/57    Intake/Output last 3 shifts:  I/O last 3 completed shifts:  In: -   Out: 5075 [Urine:5075]  Intake/Output this shift:  I/O this shift:  In: 240 [P.O.:240]  Out: -     Vent settings for last 24 hours:       Physical Exam   Vital Signs Reviewed  WDWN, Alert, NAD.    HEENT:  PERRL, EOMI.  OP, nares clear  Chest:  good aeration, crackles and rhonchi bilaterally, tympanic to percussion bilaterally, no work of breathing noted  CV: Systolic ejection murmur no MGR, pulses 2+, equal.  Abd:  Soft, NT, ND, + BS, no HSM  EXT:  no clubbing, no cyanosis, BLE edema  Neuro:  A&Ox3, CN grossly intact, no focal deficits.  Skin: No rashes or lesions noted    Result Review    Result Review:  I have personally reviewed the results from the time of this admission to 2023 13:40 EDT and agree with these findings:  [x]  Laboratory  [x]  Microbiology  [x]  Radiology  []  EKG/Telemetry   []  Cardiology/Vascular   []  Pathology  []  Old records  []  Other:  Most notable findings include:       Lab 23  0553 10/31/23  0313 10/30/23  0801 10/30/23  0249 10/29/23  0754 10/29/23  0450  10/29/23  0448   WBC 8.42 9.66  --  9.39  --   --  14.71*   HEMOGLOBIN 9.8* 8.7*  --  7.9*  --   --  9.5*   HEMATOCRIT 31.8* 28.0*  --  25.6*  --   --  32.2*   PLATELETS 230 231  --  209  --   --  307   SODIUM 146* 142  --  138  --   --  133*   SODIUM, ARTERIAL  --   --  139.3  --  133.9* 135.9*  --    POTASSIUM 3.4* 3.6  --  4.5  --   --  5.4*   CHLORIDE 103 106  --  107  --   --  100   CO2 30.8* 23.8  --  17.6*  --   --  21.9*   BUN 80* 87*  --  78*  --   --  78*   CREATININE 2.17* 2.53*  --  2.48*  --   --  2.68*   GLUCOSE 146* 113*  --  198*  --   --  572*   GLUCOSE, ARTERIAL  --   --  193*  --  509* 555*  --    CALCIUM 8.6 8.1*  --  8.2*  --   --  8.4*   PHOSPHORUS 3.8 3.7  --   --   --   --  5.9*   TOTAL PROTEIN  --   --   --  5.6*  --   --  7.5   ALBUMIN  --  3.3*  --  3.0*  --   --  4.2   GLOBULIN  --   --   --  2.6  --   --  3.3     No radiology results for the last day    Sputum culture growing Pseudomonas    Results for orders placed during the hospital encounter of 10/29/23    Adult Transthoracic Echo Complete W/ Cont if Necessary Per Protocol    Interpretation Summary    Left ventricular systolic function is normal. Calculated left ventricular EF = 56.8%    Left ventricular wall thickness is consistent with mild asymmetric hypertrophy.    Left ventricular diastolic function is consistent with (grade I) impaired relaxation.    Left atrial volume is moderately increased.    Moderate aortic valve stenosis is present.    Estimated right ventricular systolic pressure from tricuspid regurgitation is normal (<35 mmHg).    Mild dilation of the aortic root is present.        Assessment & Plan   Assessment / Plan     Active Hospital Problems:  Active Hospital Problems    Diagnosis     **Acute respiratory failure with hypoxia and hypercapnia     Respiratory failure     Bilateral pneumonia     COPD with acute exacerbation     Pulmonary edema     Hyperkalemia     Type 2 diabetes mellitus without complication      Stage 4 chronic kidney disease      Impression:  Acute hypoxic hypercarbic respiratory failure requiring mechanical vent  COPD with acute exacerbation  Acute decompensated diastolic congestive heart failure  Acute on chronic hypoxic respiratory failure  Pseudomonas pneumonia  Therapeutic drug monitoring of antibiotics  History of lung cancer s/p lobectomy on the left side  Type 2 diabetes with hyperglycemia  Urinary tract infection  Elevated proBNP  Bilateral lower extremity edema  Anemia of CKD  CKD stage IV  Hyperkalemia resolved now with hypokalemia  Hyponatremia, clinically insignificant  Hypertension  Leukocytosis    Plan:  Echocardiogram with diastolic dysfunction  Continue IV diuretics per nephrology.  Appreciate assistance  Trend renal panel and electrolytes.  Replace potassium orally  Endotracheal aspirate with Pseudomonas.  Start cefepime.  At discharge can change over to Levaquin.  Complete 7 days of antibiotics in total for Pseudomonas pneumonia  Continue beta-blocker and other antihypertensives  Continue current insulin regimen  Continue nebulizers and bronchopulmonary hygiene  Wean O2 to keep SPO2 greater than 90%    DVT prophylaxis:  Medical DVT prophylaxis orders are present.    CODE STATUS:   Code Status (Patient has no pulse and is not breathing): CPR (Attempt to Resuscitate)  Medical Interventions (Patient has pulse or is breathing): Full Support      Labs, imaging, microbiology, notes and medications personally reviewed  Discussed with primary    Electronically signed by Clement Motta MD, 11/01/23, 1:42 PM EDT.

## 2023-11-02 PROBLEM — J44.1 COPD EXACERBATION: Status: ACTIVE | Noted: 2023-11-02

## 2023-11-02 PROBLEM — D63.8 ANEMIA OF CHRONIC DISEASE: Chronic | Status: ACTIVE | Noted: 2023-11-02

## 2023-11-02 LAB
ALBUMIN SERPL-MCNC: 3.2 G/DL (ref 3.5–5.2)
ANION GAP SERPL CALCULATED.3IONS-SCNC: 10.1 MMOL/L (ref 5–15)
BUN SERPL-MCNC: 77 MG/DL (ref 8–23)
BUN/CREAT SERPL: 37.2 (ref 7–25)
CALCIUM SPEC-SCNC: 8.8 MG/DL (ref 8.6–10.5)
CHLORIDE SERPL-SCNC: 103 MMOL/L (ref 98–107)
CO2 SERPL-SCNC: 29.9 MMOL/L (ref 22–29)
CREAT SERPL-MCNC: 2.07 MG/DL (ref 0.57–1)
DEPRECATED RDW RBC AUTO: 44.3 FL (ref 37–54)
EGFRCR SERPLBLD CKD-EPI 2021: 23 ML/MIN/1.73
ERYTHROCYTE [DISTWIDTH] IN BLOOD BY AUTOMATED COUNT: 12.7 % (ref 12.3–15.4)
FERRITIN SERPL-MCNC: 1987 NG/ML (ref 13–150)
FOLATE SERPL-MCNC: 15.5 NG/ML (ref 4.78–24.2)
GLUCOSE BLDC GLUCOMTR-MCNC: 135 MG/DL (ref 70–99)
GLUCOSE BLDC GLUCOMTR-MCNC: 146 MG/DL (ref 70–99)
GLUCOSE BLDC GLUCOMTR-MCNC: 157 MG/DL (ref 70–99)
GLUCOSE BLDC GLUCOMTR-MCNC: 173 MG/DL (ref 70–99)
GLUCOSE BLDC GLUCOMTR-MCNC: 221 MG/DL (ref 70–99)
GLUCOSE SERPL-MCNC: 123 MG/DL (ref 65–99)
HCT VFR BLD AUTO: 30.7 % (ref 34–46.6)
HGB BLD-MCNC: 9.5 G/DL (ref 12–15.9)
LDH SERPL-CCNC: 192 U/L (ref 135–214)
MAGNESIUM SERPL-MCNC: 1.8 MG/DL (ref 1.6–2.4)
MCH RBC QN AUTO: 29.8 PG (ref 26.6–33)
MCHC RBC AUTO-ENTMCNC: 30.9 G/DL (ref 31.5–35.7)
MCV RBC AUTO: 96.2 FL (ref 79–97)
PHOSPHATE SERPL-MCNC: 3.8 MG/DL (ref 2.5–4.5)
PLATELET # BLD AUTO: 221 10*3/MM3 (ref 140–450)
PMV BLD AUTO: 10.3 FL (ref 6–12)
POTASSIUM SERPL-SCNC: 4.2 MMOL/L (ref 3.5–5.2)
QT INTERVAL: 369 MS
QTC INTERVAL: 478 MS
RBC # BLD AUTO: 3.19 10*6/MM3 (ref 3.77–5.28)
RETICS # AUTO: 0.1 10*6/MM3 (ref 0.02–0.13)
RETICS/RBC NFR AUTO: 3.12 % (ref 0.7–1.9)
SODIUM SERPL-SCNC: 143 MMOL/L (ref 136–145)
VIT B12 BLD-MCNC: 595 PG/ML (ref 211–946)
WBC NRBC COR # BLD: 9.12 10*3/MM3 (ref 3.4–10.8)

## 2023-11-02 PROCEDURE — 82728 ASSAY OF FERRITIN: CPT | Performed by: INTERNAL MEDICINE

## 2023-11-02 PROCEDURE — 82948 REAGENT STRIP/BLOOD GLUCOSE: CPT

## 2023-11-02 PROCEDURE — 85045 AUTOMATED RETICULOCYTE COUNT: CPT | Performed by: INTERNAL MEDICINE

## 2023-11-02 PROCEDURE — 94664 DEMO&/EVAL PT USE INHALER: CPT

## 2023-11-02 PROCEDURE — 83735 ASSAY OF MAGNESIUM: CPT | Performed by: INTERNAL MEDICINE

## 2023-11-02 PROCEDURE — 85027 COMPLETE CBC AUTOMATED: CPT | Performed by: INTERNAL MEDICINE

## 2023-11-02 PROCEDURE — 99232 SBSQ HOSP IP/OBS MODERATE 35: CPT | Performed by: INTERNAL MEDICINE

## 2023-11-02 PROCEDURE — 63710000001 INSULIN LISPRO (HUMAN) PER 5 UNITS: Performed by: NURSE PRACTITIONER

## 2023-11-02 PROCEDURE — 25010000002 HEPARIN (PORCINE) PER 1000 UNITS: Performed by: INTERNAL MEDICINE

## 2023-11-02 PROCEDURE — 83615 LACTATE (LD) (LDH) ENZYME: CPT | Performed by: INTERNAL MEDICINE

## 2023-11-02 PROCEDURE — 97116 GAIT TRAINING THERAPY: CPT

## 2023-11-02 PROCEDURE — 94799 UNLISTED PULMONARY SVC/PX: CPT

## 2023-11-02 PROCEDURE — 97166 OT EVAL MOD COMPLEX 45 MIN: CPT

## 2023-11-02 PROCEDURE — 25010000002 CEFEPIME PER 500 MG: Performed by: INTERNAL MEDICINE

## 2023-11-02 PROCEDURE — 80069 RENAL FUNCTION PANEL: CPT | Performed by: INTERNAL MEDICINE

## 2023-11-02 RX ADMIN — DOCUSATE SODIUM 50MG AND SENNOSIDES 8.6MG 1 TABLET: 8.6; 5 TABLET, FILM COATED ORAL at 09:06

## 2023-11-02 RX ADMIN — HEPARIN SODIUM 5000 UNITS: 5000 INJECTION INTRAVENOUS; SUBCUTANEOUS at 15:35

## 2023-11-02 RX ADMIN — Medication 10 ML: at 21:07

## 2023-11-02 RX ADMIN — HEPARIN SODIUM 5000 UNITS: 5000 INJECTION INTRAVENOUS; SUBCUTANEOUS at 05:30

## 2023-11-02 RX ADMIN — FUROSEMIDE 80 MG: 40 TABLET ORAL at 17:40

## 2023-11-02 RX ADMIN — AMLODIPINE BESYLATE 5 MG: 5 TABLET ORAL at 09:06

## 2023-11-02 RX ADMIN — HYDROCODONE BITARTRATE AND ACETAMINOPHEN 1 TABLET: 5; 325 TABLET ORAL at 00:10

## 2023-11-02 RX ADMIN — BUDESONIDE 0.5 MG: 0.5 INHALANT RESPIRATORY (INHALATION) at 18:30

## 2023-11-02 RX ADMIN — FAMOTIDINE 20 MG: 20 TABLET, FILM COATED ORAL at 09:06

## 2023-11-02 RX ADMIN — BUDESONIDE 0.5 MG: 0.5 INHALANT RESPIRATORY (INHALATION) at 06:25

## 2023-11-02 RX ADMIN — NEBIVOLOL HYDROCHLORIDE 10 MG: 10 TABLET ORAL at 09:07

## 2023-11-02 RX ADMIN — FUROSEMIDE 80 MG: 40 TABLET ORAL at 09:14

## 2023-11-02 RX ADMIN — ARFORMOTEROL TARTRATE 15 MCG: 15 SOLUTION RESPIRATORY (INHALATION) at 06:25

## 2023-11-02 RX ADMIN — ARFORMOTEROL TARTRATE 15 MCG: 15 SOLUTION RESPIRATORY (INHALATION) at 18:30

## 2023-11-02 RX ADMIN — Medication 10 ML: at 09:07

## 2023-11-02 RX ADMIN — INSULIN LISPRO 2 UNITS: 100 INJECTION, SOLUTION INTRAVENOUS; SUBCUTANEOUS at 12:13

## 2023-11-02 RX ADMIN — HEPARIN SODIUM 5000 UNITS: 5000 INJECTION INTRAVENOUS; SUBCUTANEOUS at 21:07

## 2023-11-02 RX ADMIN — INSULIN LISPRO 2 UNITS: 100 INJECTION, SOLUTION INTRAVENOUS; SUBCUTANEOUS at 17:22

## 2023-11-02 RX ADMIN — CEFEPIME 2000 MG: 2 INJECTION, POWDER, FOR SOLUTION INTRAVENOUS at 09:07

## 2023-11-02 NOTE — CONSULTS
Nutrition Services    Patient Name: Charlette Rai  YOB: 1935  MRN: [id number]  Admission date: 10/29/2023      CLINICAL NUTRITION ASSESSMENT      Reason for Assessment  Follow-up protocol   H&P:    Past Medical History:   Diagnosis Date    Allergic rhinitis     Anemia     Arthritis     Asthma     USE INHALERS AND NEBULIZERS    Back pain     Bladder disorder     Cancer     LEFT LUNG CANCER 2005 SURGERY AND CHEMO DONE  AND CURRENTLY 4/ 14/22  RIGHT LUNG CANCER HAS ONLY RECEIVED RADIATION THUS FAR    Chronic kidney disease     stage 3    CKD (chronic kidney disease) stage 4, GFR 15-29 ml/min     Condition not found     ulcer    Congestive heart failure (CHF)     FOLLOWED BY DR AQUINO. DENIES CP BUT DOES HAVE SOA CHRONIC ISSUE COPD/LUNG CANCER    COPD (chronic obstructive pulmonary disease)     FOLLOWED BY DR MARIAJOSE BAILEY    Coronary artery disease     DENIES CP BUT DOES GET SOA MOST OF THE TIME WITH EXERTION BUT OCC AT REST CHRONIC ISSUE COPD/LUNG CANCER    Deep vein thrombosis     Diabetes mellitus     DOES NOT CHECK BS DAILY    Disease of thyroid gland     HYPOTHYROIDISM    Essential hypertension     Gastric ulcer     GERD (gastroesophageal reflux disease)     Heart murmur     History of transfusion     NO ISSUES POST TRANSFUSION WAS MANY YEARS AGO    Hyperlipemia     Leukocytopenia     FOLLOWED BY DR MIR BAILEY    Limb swelling     Lumbago     Lumbar spinal stenosis     Lung cancer     Migraine headache     Multiple joint pain     On home oxygen therapy     3L/NC PRN    Osteopenia     Reflux esophagitis     Shortness of breath     Thyroid nodule     HAS ULTRASOUND YEARLY BEING MONITORED    Vascular disease     Vitamin D deficiency         Current Problems:   Active Hospital Problems    Diagnosis     **Acute respiratory failure with hypoxia and hypercapnia     COPD exacerbation     Anemia of chronic disease     Respiratory failure     Bilateral pneumonia     COPD with acute exacerbation     Pulmonary edema   "   Hyperkalemia     Type 2 diabetes mellitus without complication     Stage 4 chronic kidney disease         Nutrition/Diet History         Narrative     Patient admitted with acute respiratory failure.  Initially required intubation.  Patient extubated on 10/30/23.  Pt transferred from CCU to telemetry floor 11/01/23. Receiving diabetic/cardiac diet and consuming 50% per nursing documentation. Continues with weight stability. Last BM noted 10/30/23. No acute nutrition concerns or interventions indicated at this time.     Anthropometrics        Current Height, Weight Height: 165.1 cm (65\")  Weight: 78.2 kg (172 lb 6.4 oz)   Current BMI Body mass index is 28.69 kg/m².       Weight Hx  Wt Readings from Last 30 Encounters:   10/29/23 1223 78.2 kg (172 lb 6.4 oz)   10/29/23 0529 72.3 kg (159 lb 6.3 oz)   10/23/23 1500 69.8 kg (153 lb 14.1 oz)   07/07/23 1054 66.7 kg (147 lb)   03/23/23 0802 66 kg (145 lb 8.1 oz)   03/21/23 1315 65.8 kg (145 lb)   12/13/22 1118 66.7 kg (147 lb)   10/07/22 1259 68.5 kg (151 lb)   08/29/22 0832 68 kg (150 lb)   06/27/22 0912 70.3 kg (155 lb)   05/10/22 0558 70.5 kg (155 lb 6.8 oz)   04/14/22 1315 72.5 kg (159 lb 13.3 oz)   04/08/22 2116 72.1 kg (159 lb)   03/08/22 1305 74.4 kg (164 lb)   02/08/22 1252 74.7 kg (164 lb 9.6 oz)   01/10/22 1443 74.7 kg (164 lb 9.6 oz)   12/08/21 1027 73.9 kg (163 lb)   12/06/21 1039 74.8 kg (165 lb)   08/02/21 1104 75.4 kg (166 lb 3.2 oz)   06/23/21 1120 75.7 kg (166 lb 14.2 oz)   06/21/21 1106 74.9 kg (165 lb 2 oz)   06/16/21 1148 74.8 kg (164 lb 14.5 oz)   06/14/21 1057 75.1 kg (165 lb 9.1 oz)   05/13/21 0000 73 kg (161 lb)   05/07/21 0000 75.5 kg (166 lb 6 oz)   04/16/21 0000 76.3 kg (168 lb 4 oz)   03/26/21 0000 75.3 kg (166 lb)   03/08/21 0000 75.3 kg (166 lb)   02/23/21 0000 77.2 kg (170 lb 2 oz)   01/12/21 0000 76.7 kg (169 lb 3 oz)   12/22/20 0000 75.7 kg (167 lb)   12/07/20 0000 75.3 kg (166 lb)            Wt Change Observation stable "     Estimated/Assessed Needs       Energy Requirements 12-25 kcal.kg    EST Needs (kcal/day) 868-1808 kcal        Protein Requirements 1.2-2.0 g/kg   EST Daily Needs (g/day)  g       Fluid Requirements 25 ml/kg     Estimated Needs (mL/day) 1808 ml      Labs/Medications         Pertinent Labs Reviewed.   Results from last 7 days   Lab Units 11/02/23 0541 11/01/23 0553 10/31/23  0313 10/30/23  0801 10/30/23  0249 10/29/23  0450 10/29/23  0448   SODIUM mmol/L 143 146* 142  --  138  --  133*   SODIUM, ARTERIAL   --   --   --    < >  --    < >  --    POTASSIUM mmol/L 4.2 3.4* 3.6  --  4.5  --  5.4*   CHLORIDE mmol/L 103 103 106  --  107  --  100   CO2 mmol/L 29.9* 30.8* 23.8  --  17.6*  --  21.9*   BUN mg/dL 77* 80* 87*  --  78*  --  78*   CREATININE mg/dL 2.07* 2.17* 2.53*  --  2.48*  --  2.68*   CALCIUM mg/dL 8.8 8.6 8.1*  --  8.2*  --  8.4*   BILIRUBIN mg/dL  --   --   --   --  0.2  --  0.2   ALK PHOS U/L  --   --   --   --  193*  --  310*   ALT (SGPT) U/L  --   --   --   --  18  --  27   AST (SGOT) U/L  --   --   --   --  18  --  26   GLUCOSE mg/dL 123* 146* 113*  --  198*  --  572*   GLUCOSE, ARTERIAL   --   --   --    < >  --    < >  --     < > = values in this interval not displayed.     Results from last 7 days   Lab Units 11/02/23 0541 11/01/23  0553 10/31/23  0313   MAGNESIUM mg/dL 1.8 2.2 2.0   PHOSPHORUS mg/dL 3.8 3.8 3.7   HEMOGLOBIN g/dL 9.5* 9.8* 8.7*   HEMATOCRIT % 30.7* 31.8* 28.0*     COVID19   Date Value Ref Range Status   10/29/2023 Not Detected Not Detected - Ref. Range Final     Lab Results   Component Value Date    HGBA1C 7.80 (H) 10/30/2023         Pertinent Medications Reviewed.     Current Nutrition Orders & Evaluation of Intake       Oral Nutrition     Current PO Diet Diet: Regular/House Diet, Cardiac Diets, Diabetic Diets; Healthy Heart (2-3 Na+); Consistent Carbohydrate; Texture: Regular Texture (IDDSI 7); Fluid Consistency: Thin (IDDSI 0)   Supplement No active supplement orders        Malnutrition Severity Assessment                Nutrition Diagnosis         Nutrition Dx Problem 1 No nutrition diagnosis at this time      Nutrition Intervention         No nutrition intervention indicated at this time.       Medical Nutrition Therapy/Nutrition Education          Learner     Readiness Patient  Education not indicated at this time     Method     Response N/A  N/A     Monitor/Evaluation        Monitor Per protocol, PO intake, Pertinent labs, Weight, POC/GOC     Nutrition Discharge Plan         To be determined     Electronically signed by:  Padmaja Martinez RD  11/02/23 08:28 EDT

## 2023-11-02 NOTE — PROGRESS NOTES
[facility]     Progress Note    Patient Name: Charlette Rai  : 1935  MRN: [id number]  Primary Care Physician:  Rafael Lyons MD  Date of admission: 10/29/2023      Subjective   Brief summary.  Patient admitted with acute respiratory failure to ICU      HPI:  No new issues, tired fatigue, complains of some leg cramps, no chest pain, shortness of breath improving.    Review of Systems     Weakness and fatigue, no chest pain, shortness of breath positive.  Specially with exertion.  No hypo or hyperglycemic symptoms    Objective     Vitals:   Temp:  [97.9 °F (36.6 °C)-98.6 °F (37 °C)] 97.9 °F (36.6 °C)  Heart Rate:  [68-83] 72  Resp:  [16-20] 18  BP: (136-172)/(46-74) 154/46  Flow (L/min):  [1-3] 1    Physical Exam :     Elderly female not in acute distress.  Heart regular lungs diminished breath sounds bilaterally. .   Abdomen obese and soft.  Extremities with edema.  Neurologically awake alert and oriented      Result Review:  I have personally reviewed the results from the time of this admission to 2023 06:56 EDT and agree with these findings:  [x]  Laboratory  []  Microbiology  []  Radiology  []  EKG/Telemetry   []  Cardiology/Vascular   []  Pathology  []  Old records  []  Other:      Pseudomonas in sputum culture    Assessment / Plan       Active Hospital Problems:  Active Hospital Problems    Diagnosis     **Acute respiratory failure with hypoxia and hypercapnia     COPD exacerbation     Anemia of chronic disease     Respiratory failure     Bilateral pneumonia     COPD with acute exacerbation     Pulmonary edema     Hyperkalemia     Type 2 diabetes mellitus without complication     Stage 4 chronic kidney disease        Plan:   Continue cefepime for Pseudomonas in sputum.  Breathing status improving.  Hemoglobin stable, chronic anemia..  Blood sugars improved.  Continue diuretics and electrolyte replacement per nephrology.  Appreciate consultants help.  Increase activity.  Possible discharge in 1  to 2 days       DVT prophylaxis:  Medical DVT prophylaxis orders are present.    CODE STATUS:   Code Status (Patient has no pulse and is not breathing): CPR (Attempt to Resuscitate)  Medical Interventions (Patient has pulse or is breathing): Full Support              Electronically signed by Rafael Lyons MD, 11/02/23, 6:56 AM EDT.  .

## 2023-11-02 NOTE — PLAN OF CARE
Goal Outcome Evaluation:  Plan of Care Reviewed With: patient        Progress: no change (First session for evaluation)  Outcome Evaluation: Patient presents with limitations of balance, strength and endurance/activity tolerance which impede her ability to perform ADL and transfers as prior.  The skills of a therapist will be required to safely and effectively implement treatment plan to restore maximal level of function.      Anticipated Discharge Disposition (OT): skilled nursing facility

## 2023-11-02 NOTE — PROGRESS NOTES
[facility]     Nephrology Progress Note      Patient Name: Charlette Rai  : 1935  MRN: [id number]  Primary Care Physician:  Rafael Lyons MD  Date of admission: 10/29/2023    Subjective   Subjective     Interval History:  Patient Reports feeling better.    Good uop.    Tolerating p.o.     Review of Systems   All systems were reviewed and negative except for: What is mentioned above.    Objective   Objective     Vitals:   Temp:  [97.9 °F (36.6 °C)-98.8 °F (37.1 °C)] 98.8 °F (37.1 °C)  Heart Rate:  [68-83] 77  Resp:  [16-20] 18  BP: (136-168)/(46-74) 159/47  Flow (L/min):  [1-3] 1  Physical Exam:   Constitutional: Awake, alert, mildly hoarse, just extubated.   Eyes: sclerae anicteric, no conjunctival injection   HENT: mucous membranes moist   Neck: Supple, no thyromegaly, no lymphadenopathy, trachea midline, + JVD (better).   Respiratory: Decreased to auscultation bilaterally, nonlabored respirations, minimal scattered Rales.   Cardiovascular: RRR, no murmurs, rubs, or gallops.   Gastrointestinal: Positive bowel sounds, soft, nontender, nondistended   Musculoskeletal: Trace hip edema, no clubbing or cyanosis   Psychiatric: Appropriate affect, cooperative   Neurologic: Oriented x 3, moving all extremities, Cranial Nerves grossly intact, speech clear   Skin: warm and dry, no rashes     Result Review    Result Reviewed:  I have personally reviewed the results from the time of this admission to 2023 09:29 EDT and agree with these findings:  [x]  Laboratory  []  Microbiology  [x]  Radiology  []  EKG/Telemetry   []  Cardiology/Vascular   []  Pathology  [x]  Old records  []  Other:  Lab Results   Component Value Date    GLUCOSE 123 (H) 2023    CALCIUM 8.8 2023     2023    K 4.2 2023    CO2 29.9 (H) 2023     2023    BUN 77 (H) 2023    CREATININE 2.07 (H) 2023    EGFRIFNONA 17 (L) 01/10/2022    BCR 37.2 (H) 2023    ANIONGAP 10.1 2023      Lab Results   Component Value Date    CALCIUM 8.8 11/02/2023    PHOS 3.8 11/02/2023      Results from last 7 days   Lab Units 11/02/23  0541   MAGNESIUM mg/dL 1.8            Assessment & Plan   Assessment / Plan       Active Hospital Problems:  Active Hospital Problems    Diagnosis     **Acute respiratory failure with hypoxia and hypercapnia     COPD exacerbation     Anemia of chronic disease     Respiratory failure     Bilateral pneumonia     COPD with acute exacerbation     Pulmonary edema     Hyperkalemia     Type 2 diabetes mellitus without complication     Stage 4 chronic kidney disease        Assessment and Plan:    - CKDIV, due to diabetes, hypertension and previous contrast exposure, baseline creatinine between 2.2 and 2.6, here with acute respiratory failure with CHF and pulmonary edema.  Improving.  Changed IV Lasix to 80 mg p.o. twice daily (was on 40 mg twice daily)  Previous left upper extremity AV fistula was ligated due to ischemic steal.  Etiology of rather sudden onset pulmonary edema is not clear, unsure if renal artery stenosis is a factor. considered adding spironolactone but she had transient hyperkalemia.  - Metabolic acidosis, likely due to renal failure, resolved.    - Hyperkalemia, resolved.    - Severe anemia, iron studies are adequate, transfuse as needed.  May need CASSANDRA.  - Bilateral renal artery stenosis with history of hypertension.  Now blood pressure is reasonable.   - History of lung cancer, status post radiation.  - Diabetes, blood sugars are better.    - Congestive heart failure, 2D echo showed moderate aortic stenosis, EF 56% with LVH and grade 1 diastolic dysfunction.

## 2023-11-02 NOTE — THERAPY EVALUATION
Patient Name: Charlette Rai  : 1935    MRN: [id number]                              Today's Date: 2023       Admit Date: 10/29/2023    Visit Dx:     ICD-10-CM ICD-9-CM   1. Acute on chronic respiratory failure with hypoxia and hypercapnia  J96.21 518.84    J96.22 786.09     799.02   2. Acute pulmonary edema  J81.0 518.4   3. Pneumonia due to infectious organism, unspecified laterality, unspecified part of lung  J18.9 486   4. Difficulty walking  R26.2 719.7   5. Decreased activities of daily living (ADL)  Z78.9 V49.89     Patient Active Problem List   Diagnosis    Malignant neoplasm of upper lobe of right lung    Allergic rhinitis    Rheumatoid arthritis    Asthma    Chronic obstructive pulmonary disease    Congestive heart failure    Essential hypertension    GERD (gastroesophageal reflux disease)    Hyperlipidemia LDL goal <70    Lumbar spinal stenosis    Migraine    Monoclonal paraproteinemia    Osteopenia    Oxygen dependent    Peripheral neuropathy    Stage 4 chronic kidney disease    Type 2 diabetes mellitus without complication    Vitamin D deficiency    Carotid artery stenosis    Chronic right-sided thoracic back pain    Acute pain of left shoulder    Peripheral vascular disease of lower extremity    Edema    Ex-smoker    Peripheral vascular disease    De Quervain's tenosynovitis    Arthritis of carpometacarpal (CMC) joint of right thumb    Arthritis of right hand    Steal syndrome of dialysis vascular access    Anemia of chronic renal failure, stage 4 (severe)    Respiratory failure    Acute respiratory failure with hypoxia and hypercapnia    Bilateral pneumonia    COPD with acute exacerbation    Pulmonary edema    Hyperkalemia    COPD exacerbation    Anemia of chronic disease     Past Medical History:   Diagnosis Date    Allergic rhinitis     Anemia     Arthritis     Asthma     USE INHALERS AND NEBULIZERS    Back pain     Bladder disorder     Cancer     LEFT LUNG CANCER  SURGERY AND CHEMO  DONE  AND CURRENTLY 4/ 14/22  RIGHT LUNG CANCER HAS ONLY RECEIVED RADIATION THUS FAR    Chronic kidney disease     stage 3    CKD (chronic kidney disease) stage 4, GFR 15-29 ml/min     Condition not found     ulcer    Congestive heart failure (CHF)     FOLLOWED BY DR AQUINO. DENIES CP BUT DOES HAVE SOA CHRONIC ISSUE COPD/LUNG CANCER    COPD (chronic obstructive pulmonary disease)     FOLLOWED BY DR MARIAJOSE BAILEY    Coronary artery disease     DENIES CP BUT DOES GET SOA MOST OF THE TIME WITH EXERTION BUT OCC AT REST CHRONIC ISSUE COPD/LUNG CANCER    Deep vein thrombosis     Diabetes mellitus     DOES NOT CHECK BS DAILY    Disease of thyroid gland     HYPOTHYROIDISM    Essential hypertension     Gastric ulcer     GERD (gastroesophageal reflux disease)     Heart murmur     History of transfusion     NO ISSUES POST TRANSFUSION WAS MANY YEARS AGO    Hyperlipemia     Leukocytopenia     FOLLOWED BY DR MIR BAILEY    Limb swelling     Lumbago     Lumbar spinal stenosis     Lung cancer     Migraine headache     Multiple joint pain     On home oxygen therapy     3L/NC PRN    Osteopenia     Reflux esophagitis     Shortness of breath     Thyroid nodule     HAS ULTRASOUND YEARLY BEING MONITORED    Vascular disease     Vitamin D deficiency      Past Surgical History:   Procedure Laterality Date    ABDOMINAL SURGERY      APPENDECTOMY      ARTERIOVENOUS FISTULA/SHUNT SURGERY Left 05/10/2022    Procedure: Left basilic vein transposition;  Surgeon: Wan Barksdale MD;  Location: [facility] OR;  Service: Vascular;  Laterality: Left;    ARTERIOVENOUS FISTULA/SHUNT SURGERY Left 3/23/2023    Procedure: Ligation of left arm arteriovenous fistula;  Surgeon: Wan Barksdale MD;  Location: [facility] MAIN OR;  Service: Vascular;  Laterality: Left;    CARDIAC CATHETERIZATION  1996    CARDIAC SURGERY      CARDIAC SURGERY      fluid drained from heart    CATARACT EXTRACTION, BILATERAL  2003    COLONOSCOPY  2014    ENDOSCOPY  2016 2019    FEMORAL ARTERY  STENT Bilateral     HYSTERECTOMY      LUNG BIOPSY Left 2005    lobectomy upper lung caner    LUNG VOLUME REDUCTION      OTHER SURGICAL HISTORY      artifical joints/limbs    REPLACEMENT TOTAL KNEE Left 2016    UPPER GASTROINTESTINAL ENDOSCOPY        General Information       Row Name 11/02/23 1001          OT Time and Intention    Document Type evaluation  -AV     Mode of Treatment individual therapy;occupational therapy  -AV       Row Name 11/02/23 1001          General Information    Patient Profile Reviewed yes  -AV     Prior Level of Function independent:;ADL's;home management;all household mobility  Independent ADL and home management tasks.  Sits to bathe (tub seat).  Stands at sink to groom.  Comfort height commode.  Ambulates with STC.  3L O2 at night.  -AV     Existing Precautions/Restrictions oxygen therapy device and L/min  -AV     Barriers to Rehab none identified  -AV       Row Name 11/02/23 1001          Occupational Profile    Reason for Services/Referral (Occupational Profile) Patient is a 88 year old female admitted to [facility] on October 29, 2023 with shortness of air.  She is currently on 3W./1L O2.   OT consulted due to recent decline in ADL/transfer independence.  No previous OT services for current condition.  -AV       Row Name 11/02/23 1001          Living Environment    People in Home spouse  -AV       Row Name 11/02/23 1001          Home Main Entrance    Number of Stairs, Main Entrance three  -AV       Row Name 11/02/23 1001          Cognition    Orientation Status (Cognition) --  Patient is alert, pleasant and cooperative- able to retain information and follow commands.  -AV       Row Name 11/02/23 1001          Safety Issues, Functional Mobility    Impairments Affecting Function (Mobility) balance;endurance/activity tolerance;strength  -AV               User Key  (r) = Recorded By, (t) = Taken By, (c) = Cosigned By      Initials Name Provider Type    Michael Carreon, OT  "Occupational Therapist                     Mobility/ADL's       Row Name 11/02/23 1002          Transfers    Comment, (Transfers) CGA  -AV       Row Name 11/02/23 1002          Activities of Daily Living    BADL Assessment/Intervention --  Patient is able to feed self and groom independently with set up while seated.  Min-mod assist bathing/dressing.  CGA toilet hygiene (BSC).  -AV               User Key  (r) = Recorded By, (t) = Taken By, (c) = Cosigned By      Initials Name Provider Type    Michael Carreon OT Occupational Therapist                   Obj/Interventions       Row Name 11/02/23 1003          Sensory Assessment (Somatosensory)    Sensory Assessment (Somatosensory) UE sensation intact  -AV       Row Name 11/02/23 1003          Vision Assessment/Intervention    Visual Impairment/Limitations WFL;corrective lenses full-time  -AV       Row Name 11/02/23 1003          Range of Motion Comprehensive    General Range of Motion bilateral upper extremity ROM WFL  -AV     Comment, General Range of Motion AROM  -AV       Row Name 11/02/23 1003          Strength Comprehensive (MMT)    Comment, General Manual Muscle Testing (MMT) Assessment 4 -/5 bilateral upper extremities  -AV       Row Name 11/02/23 1003          Motor Skills    Motor Skills coordination;functional endurance  -AV     Coordination WFL  Right dominant.  Patient reports hands are \"kind of shaky now\" and scribbles name.  -AV     Functional Endurance Fair minus  -AV       Row Name 11/02/23 1003          Balance    Comment, Balance CGA  -AV               User Key  (r) = Recorded By, (t) = Taken By, (c) = Cosigned By      Initials Name Provider Type    Michael Carreon OT Occupational Therapist                   Goals/Plan       Row Name 11/02/23 1005          Transfer Goal 1 (OT)    Activity/Assistive Device (Transfer Goal 1, OT) transfers, all;walker, rolling  -AV     [place] Level/Cues Needed (Transfer Goal 1, OT) modified independence  -AV  "    Time Frame (Transfer Goal 1, OT) long term goal (LTG);10 days  -AV       Row Name 11/02/23 1005          Bathing Goal 1 (OT)    Activity/Device (Bathing Goal 1, OT) bathing skills, all;tub bench  -AV     [place] Level/Cues Needed (Bathing Goal 1, OT) modified independence  -AV     Time Frame (Bathing Goal 1, OT) long term goal (LTG);10 days  -AV       Row Name 11/02/23 1005          Dressing Goal 1 (OT)    Activity/Device (Dressing Goal 1, OT) dressing skills, all  -AV     [place]/Cues Needed (Dressing Goal 1, OT) modified independence  -AV     Time Frame (Dressing Goal 1, OT) long term goal (LTG);10 days  -AV       Row Name 11/02/23 1005          Toileting Goal 1 (OT)    Activity/Device (Toileting Goal 1, OT) toileting skills, all;raised toilet seat  -AV     [place] Level/Cues Needed (Toileting Goal 1, OT) modified independence  -AV     Time Frame (Toileting Goal 1, OT) long term goal (LTG);10 days  -AV       Row Name 11/02/23 1005          Grooming Goal 1 (OT)    Activity/Device (Grooming Goal 1, OT) grooming skills, all  -AV     [place] (Grooming Goal 1, OT) modified independence  Standing at sink  -AV     Time Frame (Grooming Goal 1, OT) long term goal (LTG);10 days  -AV       Row Name 11/02/23 1005          Strength Goal 1 (OT)    Strength Goal 1 (OT) Patient will demonstrate 4/5 bilateral upper extremities to increase ADL and transfer independence.  -AV     Time Frame (Strength Goal 1, OT) long term goal (LTG);10 days  -AV       Row Name 11/02/23 1005          Problem Specific Goal 1 (OT)    Problem Specific Goal 1 (OT) Patient will demonstrate fair endurance/activity tolerance needed to support ADLs.  -AV     Time Frame (Problem Specific Goal 1, OT) long term goal (LTG);10 days  -AV       Row Name 11/02/23 1005          Therapy Assessment/Plan (OT)    Planned Therapy Interventions (OT) activity tolerance training;BADL retraining;functional balance retraining;occupation/activity based  interventions;patient/caregiver education/training;strengthening exercise;transfer/mobility retraining  -AV               User Key  (r) = Recorded By, (t) = Taken By, (c) = Cosigned By      Initials Name Provider Type    AV Michael Rodríguez, MARCELLE Occupational Therapist                   Clinical Impression       Row Name 11/02/23 1004          Pain Assessment    Additional Documentation Pain Scale: FACES Pre/Post-Treatment (Group)  -AV       Row Name 11/02/23 1004          Pain Scale: FACES Pre/Post-Treatment    Pain: FACES Scale, Pretreatment 0-->no hurt  -AV     Posttreatment Pain Rating 0-->no hurt  -AV       Row Name 11/02/23 1004          Plan of Care Review    Plan of Care Reviewed With patient  -AV     Progress no change  First session for evaluation  -AV     Outcome Evaluation Patient presents with limitations of balance, strength and endurance/activity tolerance which impede her ability to perform ADL and transfers as prior.  The skills of a therapist will be required to safely and effectively implement treatment plan to restore maximal level of function.  -AV       Row Name 11/02/23 1004          Therapy Assessment/Plan (OT)    Patient/Family Therapy Goal Statement (OT) Regain independence  -AV     Rehab Potential (OT) good, to achieve stated therapy goals  -AV     Criteria for Skilled Therapeutic Interventions Met (OT) yes;meets criteria;skilled treatment is necessary  -AV     Therapy Frequency (OT) 5 times/wk  -AV       Row Name 11/02/23 1004          Therapy Plan Review/Discharge Plan (OT)    Equipment Needs Upon Discharge (OT) walker, rolling  -AV     Anticipated Discharge Disposition (OT) skilled nursing facility  -AV       Row Name 11/02/23 1004          Vital Signs    O2 Delivery Pre Treatment nasal cannula  1  -AV     O2 Delivery Intra Treatment nasal cannula  1  -AV     O2 Delivery Post Treatment nasal cannula  1  -AV       Row Name 11/02/23 1004          Positioning and Restraints    Pre-Treatment  Position in bed  -AV     Post Treatment Position bed  -AV     In Bed call light within reach;encouraged to call for assist;exit alarm on  -AV               User Key  (r) = Recorded By, (t) = Taken By, (c) = Cosigned By      Initials Name Provider Type    Michael Carreon OT Occupational Therapist                   Outcome Measures       Row Name 11/02/23 1006          How much help from another is currently needed...    Putting on and taking off regular lower body clothing? 2  -AV     Bathing (including washing, rinsing, and drying) 2  -AV     Toileting (which includes using toilet bed pan or urinal) 3  -AV     Putting on and taking off regular upper body clothing 3  -AV     Taking care of personal grooming (such as brushing teeth) 4  -AV     Eating meals 4  -AV     AM-PAC 6 Clicks Score (OT) 18  -AV       Row Name 11/02/23 1006          Functional Assessment    Outcome Measure Options AM-PAC 6 Clicks Daily Activity (OT);Optimal Instrument  -AV       Row Name 11/02/23 1006          Optimal Instrument    Optimal Instrument Optimal - 3  -AV     Bending/Stooping 2  -AV     Standing 2  -AV     Reaching 1  -AV     From the list, choose the 3 activities you would most like to be able to do without any difficulty Bending/stooping;Standing;Reaching  -AV     Total Score Optimal - 3 5  -AV               User Key  (r) = Recorded By, (t) = Taken By, (c) = Cosigned By      Initials Name Provider Type    Michael Carreon OT Occupational Therapist                    Occupational Therapy Education       Title: PT OT SLP Therapies (In Progress)       Topic: Occupational Therapy (In Progress)       Point: ADL training (Done)       Description:   Instruct learner(s) on proper safety adaptation and remediation techniques during self care or transfers.   Instruct in proper use of assistive devices.                  Learning Progress Summary             Patient Acceptance, E, VU by LEANDER at 11/2/2023 1006                         Point:  Home exercise program (Not Started)       Description:   Instruct learner(s) on appropriate technique for monitoring, assisting and/or progressing therapeutic exercises/activities.                  Learner Progress:  Not documented in this visit.              Point: Precautions (Done)       Description:   Instruct learner(s) on prescribed precautions during self-care and functional transfers.                  Learning Progress Summary             Patient Acceptance, E, VU by LEANDER at 11/2/2023 1006                         Point: Body mechanics (Not Started)       Description:   Instruct learner(s) on proper positioning and spine alignment during self-care, functional mobility activities and/or exercises.                  Learner Progress:  Not documented in this visit.                              User Key       Initials Effective Dates Name Provider Type Discipline    LEANDER 06/16/21 -  Michael Rodríguez, OT Occupational Therapist OT                  OT Recommendation and Plan  Planned Therapy Interventions (OT): activity tolerance training, BADL retraining, functional balance retraining, occupation/activity based interventions, patient/caregiver education/training, strengthening exercise, transfer/mobility retraining  Therapy Frequency (OT): 5 times/wk  Plan of Care Review  Plan of Care Reviewed With: patient  Progress: no change (First session for evaluation)  Outcome Evaluation: Patient presents with limitations of balance, strength and endurance/activity tolerance which impede her ability to perform ADL and transfers as prior.  The skills of a therapist will be required to safely and effectively implement treatment plan to restore maximal level of function.     Time Calculation:   Evaluation Complexity (OT)  Review Occupational Profile/Medical/Therapy History Complexity: expanded/moderate complexity  Assessment, Occupational Performance/Identification of Deficit Complexity: 3-5 performance deficits  Clinical Decision  Making Complexity (OT): detailed assessment/moderate complexity  Overall Complexity of Evaluation (OT): moderate complexity     Time Calculation- OT       Row Name 11/02/23 1007             Time Calculation- OT    OT Received On 11/02/23  -AV      OT Goal Re-Cert Due Date 11/11/23  -AV         Untimed Charges    OT Eval/Re-eval Minutes 35  -AV         Total Minutes    Untimed Charges Total Minutes 35  -AV       Total Minutes 35  -AV                User Key  (r) = Recorded By, (t) = Taken By, (c) = Cosigned By      Initials Name Provider Type    Michael Carreon OT Occupational Therapist                  Therapy Charges for Today       Code Description Service Date Service Provider Modifiers Qty    02385802969 HC OT EVAL MOD COMPLEXITY 3 11/2/2023 Michael Rodríguez OT GO 1                 Michael Rodríguez OT  11/2/2023

## 2023-11-02 NOTE — PLAN OF CARE
Problem: Restraint, Nonviolent  Goal: Absence of Harm or Injury  Outcome: Ongoing, Progressing  Intervention: Implement Least Restrictive Safety Strategies  Recent Flowsheet Documentation  Taken 11/1/2023 1930 by Anushka Payan RN  Diversional Activities: television  Intervention: Protect Dignity, Rights, and Personal Wellbeing  Recent Flowsheet Documentation  Taken 11/1/2023 1930 by Anushka Payan RN  Trust Relationship/Rapport:   care explained   choices provided   emotional support provided   empathic listening provided   questions answered   questions encouraged   reassurance provided   thoughts/feelings acknowledged  Intervention: Protect Skin and Joint Integrity  Recent Flowsheet Documentation  Taken 11/2/2023 0100 by Anushka Payan RN  Body Position: position changed independently  Taken 11/2/2023 0000 by Anushka Payan RN  Body Position: position changed independently  Taken 11/1/2023 2300 by Anushka Payan RN  Body Position: position changed independently  Taken 11/1/2023 2200 by Anushka Payan RN  Body Position: position changed independently  Taken 11/1/2023 2100 by Anushka Payan RN  Body Position: position changed independently  Taken 11/1/2023 1930 by Anushka Payan RN  Body Position: position changed independently  Range of Motion: active ROM (range of motion) encouraged     Problem: Skin Injury Risk Increased  Goal: Skin Health and Integrity  Outcome: Ongoing, Progressing  Intervention: Optimize Skin Protection  Recent Flowsheet Documentation  Taken 11/2/2023 0100 by Anushka Payan RN  Head of Bed (HOB) Positioning: HOB elevated  Taken 11/2/2023 0000 by Anushka Payan RN  Head of Bed (HOB) Positioning: HOB elevated  Taken 11/1/2023 2300 by Anushka Payan RN  Head of Bed (HOB) Positioning: HOB elevated  Taken 11/1/2023 2200 by Anushka Payan RN  Head of Bed (HOB) Positioning: HOB elevated  Taken 11/1/2023 2100 by Anushka Payan RN  Head of Bed (HOB) Positioning: HOB elevated  Taken 11/1/2023 1930 by Schuyler  BILL Reveles  Pressure Reduction Techniques:   frequent weight shift encouraged   weight shift assistance provided  Head of Bed (HOB) Positioning: HOB elevated  Pressure Reduction Devices: positioning supports utilized     Problem: COPD (Chronic Obstructive Pulmonary Disease) Comorbidity  Goal: Maintenance of COPD Symptom Control  Outcome: Ongoing, Progressing     Problem: Fall Injury Risk  Goal: Absence of Fall and Fall-Related Injury  Outcome: Ongoing, Progressing  Intervention: Promote Injury-Free Environment  Recent Flowsheet Documentation  Taken 11/2/2023 0100 by Anushka Payan RN  Safety Promotion/Fall Prevention: safety round/check completed  Taken 11/2/2023 0000 by Anushka Payan RN  Safety Promotion/Fall Prevention: safety round/check completed  Taken 11/1/2023 2300 by Anushka Payan RN  Safety Promotion/Fall Prevention: safety round/check completed  Taken 11/1/2023 2200 by Anushka Payan RN  Safety Promotion/Fall Prevention: safety round/check completed  Taken 11/1/2023 2100 by Anushka Payan RN  Safety Promotion/Fall Prevention: safety round/check completed  Taken 11/1/2023 1930 by Anushka Payan RN  Safety Promotion/Fall Prevention: safety round/check completed     Problem: Noninvasive Ventilation Acute  Goal: Effective Unassisted Ventilation and Oxygenation  Outcome: Ongoing, Progressing     Problem: Communication Impairment (Mechanical Ventilation, Invasive)  Goal: Effective Communication  Outcome: Ongoing, Progressing     Problem: Device-Related Complication Risk (Mechanical Ventilation, Invasive)  Goal: Optimal Device Function  Outcome: Ongoing, Progressing     Problem: Inability to Wean (Mechanical Ventilation, Invasive)  Goal: Mechanical Ventilation Liberation  Outcome: Ongoing, Progressing  Intervention: Promote Extubation and Mechanical Ventilation Liberation  Recent Flowsheet Documentation  Taken 11/1/2023 1930 by Anushka Payan RN  Sleep/Rest Enhancement:   awakenings minimized   room darkened   noise  level reduced     Problem: Nutrition Impairment (Mechanical Ventilation, Invasive)  Goal: Optimal Nutrition Delivery  Outcome: Ongoing, Progressing     Problem: Skin and Tissue Injury (Mechanical Ventilation, Invasive)  Goal: Absence of Device-Related Skin and Tissue Injury  Outcome: Ongoing, Progressing  Intervention: Maintain Skin and Tissue Health  Recent Flowsheet Documentation  Taken 11/1/2023 1930 by Anushka Payan RN  Device Skin Pressure Protection: adhesive use limited     Problem: Ventilator-Induced Lung Injury (Mechanical Ventilation, Invasive)  Goal: Absence of Ventilator-Induced Lung Injury  Outcome: Ongoing, Progressing  Intervention: Prevent Ventilator-Associated Pneumonia  Recent Flowsheet Documentation  Taken 11/2/2023 0100 by Anushka Payan RN  Head of Bed (HOB) Positioning: HOB elevated  Taken 11/2/2023 0000 by Anushka Payan RN  Head of Bed (HOB) Positioning: HOB elevated  Taken 11/1/2023 2300 by Anushka Payna RN  Head of Bed (HOB) Positioning: HOB elevated  Taken 11/1/2023 2200 by Anushka Payan RN  Head of Bed (HOB) Positioning: HOB elevated  Taken 11/1/2023 2100 by Anushka Payan RN  Head of Bed (HOB) Positioning: HOB elevated  Taken 11/1/2023 1930 by Anushka Payan RN  Head of Bed (HOB) Positioning: HOB elevated   Goal Outcome Evaluation:      Patient alert and oriented vitals all with in normal limits doing well at this time

## 2023-11-02 NOTE — THERAPY TREATMENT NOTE
Acute Care - Physical Therapy Treatment Note  MAURICE Escamilla     Patient Name: Charlette Rai  : 1935  MRN: [id number]  Today's Date: 2023      Visit Dx:     ICD-10-CM ICD-9-CM   1. Acute on chronic respiratory failure with hypoxia and hypercapnia  J96.21 518.84    J96.22 786.09     799.02   2. Acute pulmonary edema  J81.0 518.4   3. Pneumonia due to infectious organism, unspecified laterality, unspecified part of lung  J18.9 486   4. Difficulty walking  R26.2 719.7   5. Decreased activities of daily living (ADL)  Z78.9 V49.89     Patient Active Problem List   Diagnosis    Malignant neoplasm of upper lobe of right lung    Allergic rhinitis    Rheumatoid arthritis    Asthma    Chronic obstructive pulmonary disease    Congestive heart failure    Essential hypertension    GERD (gastroesophageal reflux disease)    Hyperlipidemia LDL goal <70    Lumbar spinal stenosis    Migraine    Monoclonal paraproteinemia    Osteopenia    Oxygen dependent    Peripheral neuropathy    Stage 4 chronic kidney disease    Type 2 diabetes mellitus without complication    Vitamin D deficiency    Carotid artery stenosis    Chronic right-sided thoracic back pain    Acute pain of left shoulder    Peripheral vascular disease of lower extremity    Edema    Ex-smoker    Peripheral vascular disease    De Quervain's tenosynovitis    Arthritis of carpometacarpal (CMC) joint of right thumb    Arthritis of right hand    Steal syndrome of dialysis vascular access    Anemia of chronic renal failure, stage 4 (severe)    Respiratory failure    Acute respiratory failure with hypoxia and hypercapnia    Bilateral pneumonia    COPD with acute exacerbation    Pulmonary edema    Hyperkalemia    COPD exacerbation    Anemia of chronic disease     Past Medical History:   Diagnosis Date    Allergic rhinitis     Anemia     Arthritis     Asthma     USE INHALERS AND NEBULIZERS    Back pain     Bladder disorder     Cancer     LEFT LUNG CANCER  SURGERY AND  CHEMO DONE  AND CURRENTLY 4/ 14/22  RIGHT LUNG CANCER HAS ONLY RECEIVED RADIATION THUS FAR    Chronic kidney disease     stage 3    CKD (chronic kidney disease) stage 4, GFR 15-29 ml/min     Condition not found     ulcer    Congestive heart failure (CHF)     FOLLOWED BY DR AQUINO. DENIES CP BUT DOES HAVE SOA CHRONIC ISSUE COPD/LUNG CANCER    COPD (chronic obstructive pulmonary disease)     FOLLOWED BY DR MARIAJOSE BAILEY    Coronary artery disease     DENIES CP BUT DOES GET SOA MOST OF THE TIME WITH EXERTION BUT OCC AT REST CHRONIC ISSUE COPD/LUNG CANCER    Deep vein thrombosis     Diabetes mellitus     DOES NOT CHECK BS DAILY    Disease of thyroid gland     HYPOTHYROIDISM    Essential hypertension     Gastric ulcer     GERD (gastroesophageal reflux disease)     Heart murmur     History of transfusion     NO ISSUES POST TRANSFUSION WAS MANY YEARS AGO    Hyperlipemia     Leukocytopenia     FOLLOWED BY DR MIR BAILEY    Limb swelling     Lumbago     Lumbar spinal stenosis     Lung cancer     Migraine headache     Multiple joint pain     On home oxygen therapy     3L/NC PRN    Osteopenia     Reflux esophagitis     Shortness of breath     Thyroid nodule     HAS ULTRASOUND YEARLY BEING MONITORED    Vascular disease     Vitamin D deficiency      Past Surgical History:   Procedure Laterality Date    ABDOMINAL SURGERY      APPENDECTOMY      ARTERIOVENOUS FISTULA/SHUNT SURGERY Left 05/10/2022    Procedure: Left basilic vein transposition;  Surgeon: Wan Barksdale MD;  Location: [facility] MAIN OR;  Service: Vascular;  Laterality: Left;    ARTERIOVENOUS FISTULA/SHUNT SURGERY Left 3/23/2023    Procedure: Ligation of left arm arteriovenous fistula;  Surgeon: Wan Barksdale MD;  Location: [facility] MAIN OR;  Service: Vascular;  Laterality: Left;    CARDIAC CATHETERIZATION  1996    CARDIAC SURGERY      CARDIAC SURGERY      fluid drained from heart    CATARACT EXTRACTION, BILATERAL  2003    COLONOSCOPY  2014    ENDOSCOPY  2016 2019    FEMORAL  ARTERY STENT Bilateral     HYSTERECTOMY      LUNG BIOPSY Left 2005    lobectomy upper lung caner    LUNG VOLUME REDUCTION      OTHER SURGICAL HISTORY      artifical joints/limbs    REPLACEMENT TOTAL KNEE Left 2016    UPPER GASTROINTESTINAL ENDOSCOPY       PT Assessment (last 12 hours)       PT Evaluation and Treatment       Row Name 11/02/23 1400          Physical Therapy Time and Intention    Subjective Information no complaints (P)   -AM     Document Type therapy note (daily note) (P)   -AM     Mode of Treatment individual therapy;physical therapy (P)   -AM     Patient Effort good (P)   -AM     Symptoms Noted During/After Treatment none (P)   -AM       Row Name 11/02/23 1400          General Information    Patient Observations alert;cooperative;agree to therapy (P)   -AM       Row Name 11/02/23 1400          Bed Mobility    Bed Mobility supine-sit;sit-supine (P)   -AM     Supine-Sit [place] (Bed Mobility) standby assist (P)   -AM     Sit-Supine [place] (Bed Mobility) standby assist (P)   -AM       Row Name 11/02/23 1400          Transfers    Transfers sit-stand transfer;stand-sit transfer (P)   -AM       Row Name 11/02/23 1400          Sit-Stand Transfer    Sit-Stand [place] (Transfers) standby assist (P)   -AM     Assistive Device (Sit-Stand Transfers) walker, front-wheeled (P)   -AM       Row Name 11/02/23 1400          Stand-Sit Transfer    Stand-Sit [place] (Transfers) standby assist (P)   -AM     Assistive Device (Stand-Sit Transfers) walker, front-wheeled (P)   -AM       Row Name 11/02/23 1400          Gait/Stairs (Locomotion)    Gait/Stairs Locomotion gait/ambulation assistive device (P)   -AM     [place] Level (Gait) standby assist (P)   -AM     Assistive Device (Gait) walker, front-wheeled (P)   -AM     Patient was able to Ambulate yes (P)   -AM     Distance in Feet (Gait) 100 (P)   -AM     Pattern (Gait) step-through (P)   -AM     Deviations/Abnormal Patterns (Gait) stride length  decreased;gait speed decreased (P)   -AM       Row Name 11/02/23 1400          Balance    Balance Assessment standing dynamic balance (P)   -AM     Dynamic Standing Balance standby assist (P)   -AM     Position/Device Used, Standing Balance walker, front-wheeled (P)   -AM       Row Name 11/02/23 1400          Positioning and Restraints    Pre-Treatment Position in bed (P)   -AM     Post Treatment Position bed (P)   -AM     In Bed supine;call light within reach;encouraged to call for assist;with family/caregiver (P)   -AM       Row Name 11/02/23 1400          Progress Summary (PT)    Progress Toward Functional Goals (PT) progress toward functional goals is good (P)   -AM               User Key  (r) = Recorded By, (t) = Taken By, (c) = Cosigned By      Initials Name Provider Type    Brandee Gong, PT Student PT Student                      PT Recommendation and Plan     Progress Summary (PT)  Progress Toward Functional Goals (PT): (P) progress toward functional goals is good   Outcome Measures       Row Name 11/02/23 1400 10/31/23 1000          How much help from another person do you currently need...    Turning from your back to your side while in flat bed without using bedrails? 3 (P)   -AM 3  -AV     Moving from lying on back to sitting on the side of a flat bed without bedrails? 3 (P)   -AM 3  -AV     Moving to and from a bed to a chair (including a wheelchair)? 3 (P)   -AM 3  -AV     Standing up from a chair using your arms (e.g., wheelchair, bedside chair)? 3 (P)   -AM 3  -AV     Climbing 3-5 steps with a railing? 3 (P)   -AM 2  -AV     To walk in hospital room? 3 (P)   -AM 3  -AV     AM-PAC 6 Clicks Score (PT) 18 (P)   -AM 17  -AV     Highest level of mobility 6 --> Walked 10 steps or more (P)   -AM 5 --> Static standing  -AV        Functional Assessment    Outcome Measure Options AM-PAC 6 Clicks Basic Mobility (PT) (P)   -AM AM-PAC 6 Clicks Basic Mobility (PT)  -AV               User Key  (r) = Recorded  By, (t) = Taken By, (c) = Cosigned By      Initials Name Provider Type    AV Abdi Odom, PT Physical Therapist    Brandee Gong, PT Student PT Student                     Time Calculation:    PT Charges       Row Name 11/02/23 1447             Time Calculation    PT Received On 11/02/23 (P)   -AM         Timed Charges    32989 - Gait Training Minutes  8 (P)   -AM      33077 - PT Therapeutic Activity Minutes 5 (P)   -AM         Total Minutes    Timed Charges Total Minutes 13 (P)   -AM       Total Minutes 13 (P)   -AM                User Key  (r) = Recorded By, (t) = Taken By, (c) = Cosigned By      Initials Name Provider Type    AM Brandee Osman, PT Student PT Student                  Therapy Charges for Today       Code Description Service Date Service Provider Modifiers Qty    32322253400 HC GAIT TRAINING EA 15 MIN 11/2/2023 Brandee Osman, PT Student GP 1            PT G-Codes  Outcome Measure Options: (P) AM-PAC 6 Clicks Basic Mobility (PT)  AM-PAC 6 Clicks Score (PT): (P) 18  AM-PAC 6 Clicks Score (OT): 18    Brandee Osman PT Student  11/2/2023

## 2023-11-03 LAB
BACTERIA SPEC AEROBE CULT: NORMAL
BACTERIA SPEC AEROBE CULT: NORMAL
GLUCOSE BLDC GLUCOMTR-MCNC: 122 MG/DL (ref 70–99)
GLUCOSE BLDC GLUCOMTR-MCNC: 170 MG/DL (ref 70–99)
GLUCOSE BLDC GLUCOMTR-MCNC: 216 MG/DL (ref 70–99)
GLUCOSE BLDC GLUCOMTR-MCNC: 85 MG/DL (ref 70–99)

## 2023-11-03 PROCEDURE — 25010000002 CEFEPIME PER 500 MG: Performed by: INTERNAL MEDICINE

## 2023-11-03 PROCEDURE — 94799 UNLISTED PULMONARY SVC/PX: CPT

## 2023-11-03 PROCEDURE — 82948 REAGENT STRIP/BLOOD GLUCOSE: CPT

## 2023-11-03 PROCEDURE — 97530 THERAPEUTIC ACTIVITIES: CPT

## 2023-11-03 PROCEDURE — 99232 SBSQ HOSP IP/OBS MODERATE 35: CPT | Performed by: INTERNAL MEDICINE

## 2023-11-03 PROCEDURE — 25010000002 HEPARIN (PORCINE) PER 1000 UNITS: Performed by: INTERNAL MEDICINE

## 2023-11-03 PROCEDURE — 94664 DEMO&/EVAL PT USE INHALER: CPT

## 2023-11-03 PROCEDURE — 63710000001 INSULIN LISPRO (HUMAN) PER 5 UNITS: Performed by: NURSE PRACTITIONER

## 2023-11-03 PROCEDURE — 97116 GAIT TRAINING THERAPY: CPT

## 2023-11-03 RX ORDER — AMLODIPINE BESYLATE 5 MG/1
10 TABLET ORAL
Status: DISCONTINUED | OUTPATIENT
Start: 2023-11-04 | End: 2023-11-05 | Stop reason: HOSPADM

## 2023-11-03 RX ADMIN — Medication 10 ML: at 21:05

## 2023-11-03 RX ADMIN — BUDESONIDE 0.5 MG: 0.5 INHALANT RESPIRATORY (INHALATION) at 20:14

## 2023-11-03 RX ADMIN — NEBIVOLOL HYDROCHLORIDE 10 MG: 10 TABLET ORAL at 08:51

## 2023-11-03 RX ADMIN — ARFORMOTEROL TARTRATE 15 MCG: 15 SOLUTION RESPIRATORY (INHALATION) at 06:42

## 2023-11-03 RX ADMIN — HEPARIN SODIUM 5000 UNITS: 5000 INJECTION INTRAVENOUS; SUBCUTANEOUS at 21:05

## 2023-11-03 RX ADMIN — HEPARIN SODIUM 5000 UNITS: 5000 INJECTION INTRAVENOUS; SUBCUTANEOUS at 05:07

## 2023-11-03 RX ADMIN — AMLODIPINE BESYLATE 5 MG: 5 TABLET ORAL at 08:51

## 2023-11-03 RX ADMIN — BUDESONIDE 0.5 MG: 0.5 INHALANT RESPIRATORY (INHALATION) at 06:42

## 2023-11-03 RX ADMIN — INSULIN LISPRO 2 UNITS: 100 INJECTION, SOLUTION INTRAVENOUS; SUBCUTANEOUS at 12:07

## 2023-11-03 RX ADMIN — CEFEPIME 2000 MG: 2 INJECTION, POWDER, FOR SOLUTION INTRAVENOUS at 08:52

## 2023-11-03 RX ADMIN — ACETAMINOPHEN 650 MG: 325 TABLET ORAL at 07:16

## 2023-11-03 RX ADMIN — FUROSEMIDE 80 MG: 40 TABLET ORAL at 08:54

## 2023-11-03 RX ADMIN — ARFORMOTEROL TARTRATE 15 MCG: 15 SOLUTION RESPIRATORY (INHALATION) at 20:14

## 2023-11-03 RX ADMIN — Medication 10 ML: at 08:52

## 2023-11-03 RX ADMIN — HEPARIN SODIUM 5000 UNITS: 5000 INJECTION INTRAVENOUS; SUBCUTANEOUS at 13:48

## 2023-11-03 RX ADMIN — INSULIN LISPRO 3 UNITS: 100 INJECTION, SOLUTION INTRAVENOUS; SUBCUTANEOUS at 17:20

## 2023-11-03 RX ADMIN — FAMOTIDINE 20 MG: 20 TABLET, FILM COATED ORAL at 08:51

## 2023-11-03 RX ADMIN — FUROSEMIDE 80 MG: 40 TABLET ORAL at 17:20

## 2023-11-03 NOTE — PLAN OF CARE
Goal Outcome Evaluation:     Patient is A&Ox4. VSS. No questions, complaints, or concerns. No acute changes throughout shift. No change to plan of care.

## 2023-11-03 NOTE — PLAN OF CARE
Goal Outcome Evaluation:                      Patient is alert and oriented x4.  Vital signs stable.  Patient complained of pain this morning and was medicated with PRN per MAR x1.  IV antibiotics per MAR.  Continuing plan of care.

## 2023-11-03 NOTE — CASE MANAGEMENT/SOCIAL WORK
. Discussion of the COPD zones (Green/Yellow/Red) was competed with emphasizes on what to do in the yellow and red zones. COPD action plan was reviewed with instruction on rescue and maintenance medications, the function of each and the importance of using them as prescribed. MDI administration with a spacer was instructed; patient was provided with spacer for discharge.      Ms. Rai was also provided with OPEP/aerobika for airway clearance at home.

## 2023-11-03 NOTE — PROGRESS NOTES
[facility]     Nephrology Progress Note      Patient Name: Charlette Rai  : 1935  MRN: [id number]  Primary Care Physician:  Rafael Lyons MD  Date of admission: 10/29/2023    Subjective   Subjective     Interval History:  Patient Reports feeling better.  No new complaints.  Blood pressure is on the higher side.  Good uop.    Tolerating p.o.     Review of Systems   All systems were reviewed and negative except for: What is mentioned above.    Objective   Objective     Vitals:   Temp:  [98.2 °F (36.8 °C)-98.6 °F (37 °C)] 98.3 °F (36.8 °C)  Heart Rate:  [66-80] 71  Resp:  [16-18] 16  BP: (143-165)/(45-53) 143/47  Flow (L/min):  [1] 1  Physical Exam:   Constitutional: Awake, alert, mildly hoarse, just extubated.   Eyes: sclerae anicteric, no conjunctival injection   HENT: mucous membranes moist   Neck: Supple, no thyromegaly, no lymphadenopathy, trachea midline, trace JVD    Respiratory: Decreased to auscultation bilaterally, nonlabored respirations, minimal scattered Rales.   Cardiovascular: RRR, no murmurs, rubs, or gallops.   Gastrointestinal: Positive bowel sounds, soft, nontender, nondistended   Musculoskeletal: Trace hip edema, no clubbing or cyanosis   Psychiatric: Appropriate affect, cooperative   Neurologic: Oriented x 3, moving all extremities, Cranial Nerves grossly intact, speech clear   Skin: warm and dry, no rashes     Result Review    Result Reviewed:  I have personally reviewed the results from the time of this admission to 11/3/2023 09:01 EDT and agree with these findings:  [x]  Laboratory  []  Microbiology  [x]  Radiology  []  EKG/Telemetry   []  Cardiology/Vascular   []  Pathology  [x]  Old records  []  Other:  Lab Results   Component Value Date    GLUCOSE 123 (H) 2023    CALCIUM 8.8 2023     2023    K 4.2 2023    CO2 29.9 (H) 2023     2023    BUN 77 (H) 2023    CREATININE 2.07 (H) 2023    EGFRIFNONA 17 (L) 01/10/2022    BCR 37.2  (H) 11/02/2023    ANIONGAP 10.1 11/02/2023     Lab Results   Component Value Date    CALCIUM 8.8 11/02/2023    PHOS 3.8 11/02/2023      Results from last 7 days   Lab Units 11/02/23  0541   MAGNESIUM mg/dL 1.8            Assessment & Plan   Assessment / Plan       Active Hospital Problems:  Active Hospital Problems    Diagnosis     **Acute respiratory failure with hypoxia and hypercapnia     COPD exacerbation     Anemia of chronic disease     Respiratory failure     Bilateral pneumonia     COPD with acute exacerbation     Pulmonary edema     Hyperkalemia     Type 2 diabetes mellitus without complication     Stage 4 chronic kidney disease        Assessment and Plan:    - CKDIV due to diabetes, hypertension and previous contrast exposure, baseline creatinine between 2.2 and 2.6, here with acute respiratory failure with CHF and pulmonary edema.  Improved.  Currently on higher dose of Lasix 80 mg p.o. twice daily (was on 40 mg twice daily)  Previous left upper extremity AV fistula was ligated due to ischemic steal.  Etiology of rather sudden onset pulmonary edema is not clear, unsure if renal artery stenosis is a factor. considered adding spironolactone but she had transient hyperkalemia.  We will monitor for now.  - Hypertension, severe at times.  Increase amlodipine to 10 mg daily.  - Metabolic acidosis, likely due to renal failure, resolved.    - Hyperkalemia, resolved.    - Severe anemia, iron studies are adequate, transfuse as needed.  May need CASSANDRA.  - Bilateral renal artery stenosis with history of hypertension.  Now blood pressure is reasonable.   - History of lung cancer, status post radiation.  - Diabetes, blood sugars are better.    - Congestive heart failure, 2D echo showed moderate aortic stenosis, EF 56% with LVH and grade 1 diastolic dysfunction.  Discussed with primary.  If discharged, would like to see her in the office in 2 weeks for follow-up.  Please call with any questions.

## 2023-11-03 NOTE — THERAPY TREATMENT NOTE
Acute Care - Physical Therapy Treatment Note  MAURICE Escamilla     Patient Name: Charlette Rai  : 1935  MRN: [id number]  Today's Date: 11/3/2023      Visit Dx:     ICD-10-CM ICD-9-CM   1. Acute on chronic respiratory failure with hypoxia and hypercapnia  J96.21 518.84    J96.22 786.09     799.02   2. Acute pulmonary edema  J81.0 518.4   3. Pneumonia due to infectious organism, unspecified laterality, unspecified part of lung  J18.9 486   4. Difficulty walking  R26.2 719.7   5. Decreased activities of daily living (ADL)  Z78.9 V49.89     Patient Active Problem List   Diagnosis    Malignant neoplasm of upper lobe of right lung    Allergic rhinitis    Rheumatoid arthritis    Asthma    Chronic obstructive pulmonary disease    Congestive heart failure    Essential hypertension    GERD (gastroesophageal reflux disease)    Hyperlipidemia LDL goal <70    Lumbar spinal stenosis    Migraine    Monoclonal paraproteinemia    Osteopenia    Oxygen dependent    Peripheral neuropathy    Stage 4 chronic kidney disease    Type 2 diabetes mellitus without complication    Vitamin D deficiency    Carotid artery stenosis    Chronic right-sided thoracic back pain    Acute pain of left shoulder    Peripheral vascular disease of lower extremity    Edema    Ex-smoker    Peripheral vascular disease    De Quervain's tenosynovitis    Arthritis of carpometacarpal (CMC) joint of right thumb    Arthritis of right hand    Steal syndrome of dialysis vascular access    Anemia of chronic renal failure, stage 4 (severe)    Respiratory failure    Acute respiratory failure with hypoxia and hypercapnia    Bilateral pneumonia    COPD with acute exacerbation    Pulmonary edema    Hyperkalemia    COPD exacerbation    Anemia of chronic disease     Past Medical History:   Diagnosis Date    Allergic rhinitis     Anemia     Arthritis     Asthma     USE INHALERS AND NEBULIZERS    Back pain     Bladder disorder     Cancer     LEFT LUNG CANCER  SURGERY AND  CHEMO DONE  AND CURRENTLY 4/ 14/22  RIGHT LUNG CANCER HAS ONLY RECEIVED RADIATION THUS FAR    Chronic kidney disease     stage 3    CKD (chronic kidney disease) stage 4, GFR 15-29 ml/min     Condition not found     ulcer    Congestive heart failure (CHF)     FOLLOWED BY DR AQUINO. DENIES CP BUT DOES HAVE SOA CHRONIC ISSUE COPD/LUNG CANCER    COPD (chronic obstructive pulmonary disease)     FOLLOWED BY DR MARIAJOSE BAILEY    Coronary artery disease     DENIES CP BUT DOES GET SOA MOST OF THE TIME WITH EXERTION BUT OCC AT REST CHRONIC ISSUE COPD/LUNG CANCER    Deep vein thrombosis     Diabetes mellitus     DOES NOT CHECK BS DAILY    Disease of thyroid gland     HYPOTHYROIDISM    Essential hypertension     Gastric ulcer     GERD (gastroesophageal reflux disease)     Heart murmur     History of transfusion     NO ISSUES POST TRANSFUSION WAS MANY YEARS AGO    Hyperlipemia     Leukocytopenia     FOLLOWED BY DR MIR BAILEY    Limb swelling     Lumbago     Lumbar spinal stenosis     Lung cancer     Migraine headache     Multiple joint pain     On home oxygen therapy     3L/NC PRN    Osteopenia     Reflux esophagitis     Shortness of breath     Thyroid nodule     HAS ULTRASOUND YEARLY BEING MONITORED    Vascular disease     Vitamin D deficiency      Past Surgical History:   Procedure Laterality Date    ABDOMINAL SURGERY      APPENDECTOMY      ARTERIOVENOUS FISTULA/SHUNT SURGERY Left 05/10/2022    Procedure: Left basilic vein transposition;  Surgeon: Wan Barksdale MD;  Location: [facility] MAIN OR;  Service: Vascular;  Laterality: Left;    ARTERIOVENOUS FISTULA/SHUNT SURGERY Left 3/23/2023    Procedure: Ligation of left arm arteriovenous fistula;  Surgeon: Wan Barksdale MD;  Location: [facility] MAIN OR;  Service: Vascular;  Laterality: Left;    CARDIAC CATHETERIZATION  1996    CARDIAC SURGERY      CARDIAC SURGERY      fluid drained from heart    CATARACT EXTRACTION, BILATERAL  2003    COLONOSCOPY  2014    ENDOSCOPY  2016 2019    FEMORAL  ARTERY STENT Bilateral     HYSTERECTOMY      LUNG BIOPSY Left 2005    lobectomy upper lung caner    LUNG VOLUME REDUCTION      OTHER SURGICAL HISTORY      artifical joints/limbs    REPLACEMENT TOTAL KNEE Left 2016    UPPER GASTROINTESTINAL ENDOSCOPY       PT Assessment (last 12 hours)       PT Evaluation and Treatment       Row Name 11/03/23 1307          Physical Therapy Time and Intention    Subjective Information no complaints  -RH     Document Type therapy note (daily note)  -RH     Mode of Treatment physical therapy;individual therapy  -RH     Patient Effort good  -RH       Row Name 11/03/23 1307          Pain Scale: FACES Pre/Post-Treatment    Pain: FACES Scale, Pretreatment 0-->no hurt  -RH     Posttreatment Pain Rating 0-->no hurt  -RH       Row Name 11/03/23 1307          Transfers    Transfers sit-stand transfer;stand-sit transfer  -       Row Name 11/03/23 1307          Sit-Stand Transfer    Sit-Stand [place] (Transfers) standby assist  -     Assistive Device (Sit-Stand Transfers) walker, front-wheeled  -       Row Name 11/03/23 1307          Stand-Sit Transfer    Stand-Sit [place] (Transfers) standby assist  -     Assistive Device (Stand-Sit Transfers) walker, front-wheeled  -       Row Name 11/03/23 1307          Gait/Stairs (Locomotion)    Gait/Stairs Locomotion gait/ambulation independence;gait/ambulation assistive device;distance ambulated;gait pattern;gait deviations  -RH     [place] Level (Gait) standby assist  -     Assistive Device (Gait) walker, front-wheeled  -RH     Patient was able to Ambulate yes  -RH     Distance in Feet (Gait) 150  -RH     Pattern (Gait) --  Pt able to achieve and maintain reciprocal gait.  -RH     Deviations/Abnormal Patterns (Gait) gait speed decreased;stride length decreased;base of support, narrow  -RH     Gait Assessment/Intervention Pt amb with RW and SBA with reciprocal gait with decreased base of support, gait speed, and stride length.   -       Row Name 11/03/23 1307          Balance    Dynamic Standing Balance standby assist  -RH     Position/Device Used, Standing Balance walker, front-wheeled  -RH       Row Name 11/03/23 1307          Vital Signs    O2 Delivery Intra Treatment room air  -       Row Name 11/03/23 1307          Progress Summary (PT)    Progress Toward Functional Goals (PT) progress toward functional goals is good  -               User Key  (r) = Recorded By, (t) = Taken By, (c) = Cosigned By      Initials Name Provider Type     Elliott James PTA Physical Therapist Assistant                      PT Recommendation and Plan     Progress Summary (PT)  Progress Toward Functional Goals (PT): progress toward functional goals is good   Outcome Measures       Row Name 11/03/23 1300 11/02/23 1400          How much help from another person do you currently need...    Turning from your back to your side while in flat bed without using bedrails? 3  -RH 3  -DP (r) AM (t) DP (c)     Moving from lying on back to sitting on the side of a flat bed without bedrails? 3  -RH 3  -DP (r) AM (t) DP (c)     Moving to and from a bed to a chair (including a wheelchair)? 3  -RH 3  -DP (r) AM (t) DP (c)     Standing up from a chair using your arms (e.g., wheelchair, bedside chair)? 3  -RH 3  -DP (r) AM (t) DP (c)     Climbing 3-5 steps with a railing? 3  -RH 3  -DP (r) AM (t) DP (c)     To walk in hospital room? 4  -RH 3  -DP (r) AM (t) DP (c)     AM-PAC 6 Clicks Score (PT) 19  -RH 18  -DP (r) AM (t)     Highest level of mobility 6 --> Walked 10 steps or more  - 6 --> Walked 10 steps or more  -DP (r) AM (t)        Functional Assessment    Outcome Measure Options -- AM-PAC 6 Clicks Basic Mobility (PT)  -DP (r) AM (t) DP (c)               User Key  (r) = Recorded By, (t) = Taken By, (c) = Cosigned By      Initials Name Provider Type    RH Elliott James, [place] Physical Therapist Assistant    Kaylene Perez, PT Physical Therapist    AM Brandee Osman  PT Student PT Student                     Time Calculation:    PT Charges       Row Name 11/03/23 1305             Time Calculation    PT Received On 11/03/23  -RH         Timed Charges    85658 - Gait Training Minutes  7  -RH      48868 - PT Therapeutic Activity Minutes 5  -RH         Total Minutes    Timed Charges Total Minutes 12  -RH       Total Minutes 12  -RH                User Key  (r) = Recorded By, (t) = Taken By, (c) = Cosigned By      Initials Name Provider Type     Elliott James PTA Physical Therapist Assistant                  Therapy Charges for Today       Code Description Service Date Service Provider Modifiers Qty    99738835512 HC GAIT TRAINING EA 15 MIN 11/3/2023 Elliott James PTA GP 1            PT G-Codes  Outcome Measure Options: AM-PAC 6 Clicks Daily Activity (OT), Optimal Instrument  AM-PAC 6 Clicks Score (PT): 19  AM-PAC 6 Clicks Score (OT): 21    Elliott James PTA  11/3/2023

## 2023-11-03 NOTE — THERAPY TREATMENT NOTE
Patient Name: Charlette Rai  : 1935    MRN: [id number]                              Today's Date: 11/3/2023       Admit Date: 10/29/2023    Visit Dx:     ICD-10-CM ICD-9-CM   1. Acute on chronic respiratory failure with hypoxia and hypercapnia  J96.21 518.84    J96.22 786.09     799.02   2. Acute pulmonary edema  J81.0 518.4   3. Pneumonia due to infectious organism, unspecified laterality, unspecified part of lung  J18.9 486   4. Difficulty walking  R26.2 719.7   5. Decreased activities of daily living (ADL)  Z78.9 V49.89     Patient Active Problem List   Diagnosis    Malignant neoplasm of upper lobe of right lung    Allergic rhinitis    Rheumatoid arthritis    Asthma    Chronic obstructive pulmonary disease    Congestive heart failure    Essential hypertension    GERD (gastroesophageal reflux disease)    Hyperlipidemia LDL goal <70    Lumbar spinal stenosis    Migraine    Monoclonal paraproteinemia    Osteopenia    Oxygen dependent    Peripheral neuropathy    Stage 4 chronic kidney disease    Type 2 diabetes mellitus without complication    Vitamin D deficiency    Carotid artery stenosis    Chronic right-sided thoracic back pain    Acute pain of left shoulder    Peripheral vascular disease of lower extremity    Edema    Ex-smoker    Peripheral vascular disease    De Quervain's tenosynovitis    Arthritis of carpometacarpal (CMC) joint of right thumb    Arthritis of right hand    Steal syndrome of dialysis vascular access    Anemia of chronic renal failure, stage 4 (severe)    Respiratory failure    Acute respiratory failure with hypoxia and hypercapnia    Bilateral pneumonia    COPD with acute exacerbation    Pulmonary edema    Hyperkalemia    COPD exacerbation    Anemia of chronic disease     Past Medical History:   Diagnosis Date    Allergic rhinitis     Anemia     Arthritis     Asthma     USE INHALERS AND NEBULIZERS    Back pain     Bladder disorder     Cancer     LEFT LUNG CANCER  SURGERY AND CHEMO  DONE  AND CURRENTLY 4/ 14/22  RIGHT LUNG CANCER HAS ONLY RECEIVED RADIATION THUS FAR    Chronic kidney disease     stage 3    CKD (chronic kidney disease) stage 4, GFR 15-29 ml/min     Condition not found     ulcer    Congestive heart failure (CHF)     FOLLOWED BY DR AQUINO. DENIES CP BUT DOES HAVE SOA CHRONIC ISSUE COPD/LUNG CANCER    COPD (chronic obstructive pulmonary disease)     FOLLOWED BY DR MARIAJOSE BAILEY    Coronary artery disease     DENIES CP BUT DOES GET SOA MOST OF THE TIME WITH EXERTION BUT OCC AT REST CHRONIC ISSUE COPD/LUNG CANCER    Deep vein thrombosis     Diabetes mellitus     DOES NOT CHECK BS DAILY    Disease of thyroid gland     HYPOTHYROIDISM    Essential hypertension     Gastric ulcer     GERD (gastroesophageal reflux disease)     Heart murmur     History of transfusion     NO ISSUES POST TRANSFUSION WAS MANY YEARS AGO    Hyperlipemia     Leukocytopenia     FOLLOWED BY DR MIR BAILEY    Limb swelling     Lumbago     Lumbar spinal stenosis     Lung cancer     Migraine headache     Multiple joint pain     On home oxygen therapy     3L/NC PRN    Osteopenia     Reflux esophagitis     Shortness of breath     Thyroid nodule     HAS ULTRASOUND YEARLY BEING MONITORED    Vascular disease     Vitamin D deficiency      Past Surgical History:   Procedure Laterality Date    ABDOMINAL SURGERY      APPENDECTOMY      ARTERIOVENOUS FISTULA/SHUNT SURGERY Left 05/10/2022    Procedure: Left basilic vein transposition;  Surgeon: Wan Barksdale MD;  Location: [facility] OR;  Service: Vascular;  Laterality: Left;    ARTERIOVENOUS FISTULA/SHUNT SURGERY Left 3/23/2023    Procedure: Ligation of left arm arteriovenous fistula;  Surgeon: Wan Barksdale MD;  Location: [facility] MAIN OR;  Service: Vascular;  Laterality: Left;    CARDIAC CATHETERIZATION  1996    CARDIAC SURGERY      CARDIAC SURGERY      fluid drained from heart    CATARACT EXTRACTION, BILATERAL  2003    COLONOSCOPY  2014    ENDOSCOPY  2016 2019    FEMORAL ARTERY  STENT Bilateral     HYSTERECTOMY      LUNG BIOPSY Left 2005    lobectomy upper lung caner    LUNG VOLUME REDUCTION      OTHER SURGICAL HISTORY      artifical joints/limbs    REPLACEMENT TOTAL KNEE Left 2016    UPPER GASTROINTESTINAL ENDOSCOPY        General Information       Row Name 11/03/23 0949          OT Time and Intention    Document Type therapy note (daily note)  -EG     Mode of Treatment occupational therapy;individual therapy  -EG       Row Name 11/03/23 0949          General Information    Existing Precautions/Restrictions oxygen therapy device and L/min;fall  -EG       Row Name 11/03/23 0949          Safety Issues, Functional Mobility    Impairments Affecting Function (Mobility) balance;endurance/activity tolerance;strength  -EG               User Key  (r) = Recorded By, (t) = Taken By, (c) = Cosigned By      Initials Name Provider Type    EG Eladia Guardado, MARCELLE Occupational Therapist                     Mobility/ADL's       Row Name 11/03/23 0951          Bed Mobility    Bed Mobility supine-sit  -EG     Supine-Sit [place] (Bed Mobility) standby assist  -EG     Assistive Device (Bed Mobility) bed rails  -EG       Row Name 11/03/23 0951          Transfers    Transfers bed-chair transfer;sit-stand transfer;stand-sit transfer  -EG       Row Name 11/03/23 0951          Bed-Chair Transfer    Bed-Chair [place] (Transfers) contact guard;standby assist;verbal cues  -EG     Assistive Device (Bed-Chair Transfers) walker, front-wheeled  -EG       Row Name 11/03/23 0951          Sit-Stand Transfer    Sit-Stand [place] (Transfers) standby assist  -EG     Assistive Device (Sit-Stand Transfers) walker, front-wheeled  -EG       Row Name 11/03/23 0951          Stand-Sit Transfer    Stand-Sit [place] (Transfers) standby assist  -EG     Assistive Device (Stand-Sit Transfers) walker, front-wheeled  -EG       Row Name 11/03/23 0951          Functional Mobility    Functional Mobility- Ind. Level contact  guard assist;standby assist  -EG     Functional Mobility- Device walker, front-wheeled  -EG     Functional Mobility- Comment Patient able to perform functional mobility down hallway of unit per her request using RW  -EG               User Key  (r) = Recorded By, (t) = Taken By, (c) = Cosigned By      Initials Name Provider Type    Eladia Jaramillo OT Occupational Therapist                   Obj/Interventions       Row Name 11/03/23 0955          Sensory Assessment (Somatosensory)    Sensory Assessment (Somatosensory) UE sensation intact  -EG       Row Name 11/03/23 0955          Vision Assessment/Intervention    Visual Impairment/Limitations WFL;corrective lenses full-time  -EG       Row Name 11/03/23 0955          Motor Skills    Motor Skills functional endurance  -EG     Functional Endurance fair on 1L NC  -EG       Row Name 11/03/23 0955          Balance    Balance Interventions standing;sit to stand;occupation based/functional task;weight shifting activity;supported  -EG               User Key  (r) = Recorded By, (t) = Taken By, (c) = Cosigned By      Initials Name Provider Type    Eladia Jaramillo, MARCELLE Occupational Therapist                   Goals/Plan    No documentation.                  Clinical Impression       Row Name 11/03/23 0957          Pain Assessment    Pretreatment Pain Rating 0/10 - no pain  -EG     Posttreatment Pain Rating 0/10 - no pain  -EG       Row Name 11/03/23 0957          Plan of Care Review    Plan of Care Reviewed With patient  -EG     Progress improving  -EG     Outcome Evaluation Patient is pleasant and cooperative; motivated to improve and be able to return home; OT services will continue to treat and address all deficits in performance skills; Ax1 w/RW  -EG       Row Name 11/03/23 0957          Therapy Assessment/Plan (OT)    Rehab Potential (OT) good, to achieve stated therapy goals  -EG     Criteria for Skilled Therapeutic Interventions Met (OT) yes;meets criteria;skilled  treatment is necessary  -EG     Therapy Frequency (OT) 5 times/wk  -EG       Row Name 11/03/23 0957          Therapy Plan Review/Discharge Plan (OT)    Anticipated Discharge Disposition (OT) [facility] nursing facility  -EG               User Key  (r) = Recorded By, (t) = Taken By, (c) = Cosigned By      Initials Name Provider Type    EG Eladia Guardado, OT Occupational Therapist                   Outcome Measures       Row Name 11/03/23 1019          How much help from another is currently needed...    Putting on and taking off regular lower body clothing? 3  -EG     Bathing (including washing, rinsing, and drying) 3  -EG     Toileting (which includes using toilet bed pan or urinal) 3  -EG     Putting on and taking off regular upper body clothing 4  -EG     Taking care of personal grooming (such as brushing teeth) 4  -EG     Eating meals 4  -EG     AM-PAC 6 Clicks Score (OT) 21  -EG       Row Name 11/03/23 0714          How much help from another person do you currently need...    Turning from your back to your side while in flat bed without using bedrails? 3  -MF     Moving from lying on back to sitting on the side of a flat bed without bedrails? 3  -MF     Moving to and from a bed to a chair (including a wheelchair)? 3  -MF     Standing up from a chair using your arms (e.g., wheelchair, bedside chair)? 3  -MF     Climbing 3-5 steps with a railing? 3  -MF     To walk in hospital room? 3  -MF     AM-PAC 6 Clicks Score (PT) 18  -MF     Highest level of mobility 6 --> Walked 10 steps or more  -       Row Name 11/03/23 1019          Functional Assessment    Outcome Measure Options AM-PAC 6 Clicks Daily Activity (OT);Optimal Instrument  -       Row Name 11/03/23 1019          Optimal Instrument    Optimal Instrument Optimal - 3  -EG     Bending/Stooping 2  -EG     Standing 2  -EG     Reaching 1  -EG               User Key  (r) = Recorded By, (t) = Taken By, (c) = Cosigned By      Initials Name Provider Type      Divine Chance, RN Registered Nurse    Eladia Jaramillo OT Occupational Therapist                    Occupational Therapy Education       Title: PT OT SLP Therapies (Done)       Topic: Occupational Therapy (Done)       Point: ADL training (Done)       Description:   Instruct learner(s) on proper safety adaptation and remediation techniques during self care or transfers.   Instruct in proper use of assistive devices.                  Learning Progress Summary             Patient Acceptance, E, VU by LY at 11/2/2023 1947    Acceptance, E, VU by AV at 11/2/2023 1006                         Point: Home exercise program (Done)       Description:   Instruct learner(s) on appropriate technique for monitoring, assisting and/or progressing therapeutic exercises/activities.                  Learning Progress Summary             Patient Acceptance, E, VU by LY at 11/2/2023 1947                         Point: Precautions (Done)       Description:   Instruct learner(s) on prescribed precautions during self-care and functional transfers.                  Learning Progress Summary             Patient Acceptance, E, VU by LY at 11/2/2023 1947    Acceptance, E, VU by AV at 11/2/2023 1006                         Point: Body mechanics (Done)       Description:   Instruct learner(s) on proper positioning and spine alignment during self-care, functional mobility activities and/or exercises.                  Learning Progress Summary             Patient Acceptance, E, VU by LY at 11/2/2023 1947                                         User Key       Initials Effective Dates Name Provider Type Discipline    AV 06/16/21 -  Michael Rodríguez OT Occupational Therapist OT    LY 06/06/23 -  Jody Ballesteros, RN Registered Nurse Nurse                  OT Recommendation and Plan  Therapy Frequency (OT): 5 times/wk  Plan of Care Review  Plan of Care Reviewed With: patient  Progress: improving  Outcome Evaluation: Patient is pleasant and  cooperative; motivated to improve and be able to return home; OT services will continue to treat and address all deficits in performance skills; Ax1 w/RW     Time Calculation:         Time Calculation- OT       Row Name 11/03/23 1019             Time Calculation- OT    OT Received On 11/03/23  -EG      OT Goal Re-Cert Due Date 11/11/23  -EG         Timed Charges    87530 - OT Therapeutic Activity Minutes 15  -EG         Total Minutes    Timed Charges Total Minutes 15  -EG       Total Minutes 15  -EG                User Key  (r) = Recorded By, (t) = Taken By, (c) = Cosigned By      Initials Name Provider Type    EG Eladia Guardado OT Occupational Therapist                  Therapy Charges for Today       Code Description Service Date Service Provider Modifiers Qty    64701457204 HC OT THERAPEUTIC ACT EA 15 MIN 11/3/2023 Eladia Guardado OT GO 1                 Eladia Guardado OT  11/3/2023

## 2023-11-03 NOTE — PROGRESS NOTES
[facility]     Progress Note    Patient Name: Charlette Rai  : 1935  MRN: [id number]  Primary Care Physician:  Rafael Lyons MD  Date of admission: 10/29/2023      Subjective   Brief summary.  Patient admitted with acute respiratory failure to ICU, transferred out of ICU several days ago      HPI:  No new issues,  Breathing better.  Out of bed in chair.  No shortness of breath today.      Review of Systems   Fatigue and weakness.  No fever chills  No cough.  .    Objective     Vitals:   Temp:  [98.2 °F (36.8 °C)-98.4 °F (36.9 °C)] 98.2 °F (36.8 °C)  Heart Rate:  [66-80] 71  Resp:  [16-18] 16  BP: (130-177)/(47-90) 177/62  Flow (L/min):  [1] 1    Physical Exam :     Elderly female not in acute distress.  Heart regular lungs diminished breath sounds bilaterally. .   Abdomen obese and soft.  Extremities with trace of edema.  Neurologically awake alert and oriented      Result Review:  I have personally reviewed the results from the time of this admission to 11/3/2023 15:39 EDT and agree with these findings:  [x]  Laboratory  []  Microbiology  []  Radiology  []  EKG/Telemetry   []  Cardiology/Vascular   []  Pathology  []  Old records  []  Other:      Pseudomonas in sputum culture  Hemoglobin around 9 g    Assessment / Plan       Active Hospital Problems:  Active Hospital Problems    Diagnosis     **Acute respiratory failure with hypoxia and hypercapnia     COPD exacerbation     Anemia of chronic disease     Respiratory failure     Bilateral pneumonia     COPD with acute exacerbation     Pulmonary edema     Hyperkalemia     Type 2 diabetes mellitus without complication     Stage 4 chronic kidney disease        Plan:   Continue cefepime for Pseudomonas in sputum.  Switch to oral Levaquin on discharge  Breathing status improving.  Hemoglobin stable, chronic anemia..  Blood sugars improved.  Continue diuretics and electrolyte replacement per nephrology.  Appreciate consultants help.  Increase  activity.  Declined/refused nursing home or inpatient rehab, prefers to go home.    Discharge home over the weekend.       DVT prophylaxis:  Medical DVT prophylaxis orders are present.    CODE STATUS:   Code Status (Patient has no pulse and is not breathing): CPR (Attempt to Resuscitate)  Medical Interventions (Patient has pulse or is breathing): Full Support                Electronically signed by Rafael Lyons MD, 11/03/23, 3:41 PM EDT.    .

## 2023-11-04 LAB
ALBUMIN SERPL-MCNC: 3 G/DL (ref 3.5–5.2)
ANION GAP SERPL CALCULATED.3IONS-SCNC: 12 MMOL/L (ref 5–15)
BUN SERPL-MCNC: 74 MG/DL (ref 8–23)
BUN/CREAT SERPL: 34.4 (ref 7–25)
CALCIUM SPEC-SCNC: 8.5 MG/DL (ref 8.6–10.5)
CHLORIDE SERPL-SCNC: 100 MMOL/L (ref 98–107)
CO2 SERPL-SCNC: 28 MMOL/L (ref 22–29)
CREAT SERPL-MCNC: 2.15 MG/DL (ref 0.57–1)
EGFRCR SERPLBLD CKD-EPI 2021: 21.9 ML/MIN/1.73
GLUCOSE BLDC GLUCOMTR-MCNC: 101 MG/DL (ref 70–99)
GLUCOSE BLDC GLUCOMTR-MCNC: 117 MG/DL (ref 70–99)
GLUCOSE BLDC GLUCOMTR-MCNC: 143 MG/DL (ref 70–99)
GLUCOSE BLDC GLUCOMTR-MCNC: 158 MG/DL (ref 70–99)
GLUCOSE BLDC GLUCOMTR-MCNC: 176 MG/DL (ref 70–99)
GLUCOSE SERPL-MCNC: 104 MG/DL (ref 65–99)
PHOSPHATE SERPL-MCNC: 4 MG/DL (ref 2.5–4.5)
POTASSIUM SERPL-SCNC: 3.8 MMOL/L (ref 3.5–5.2)
SODIUM SERPL-SCNC: 140 MMOL/L (ref 136–145)

## 2023-11-04 PROCEDURE — 80069 RENAL FUNCTION PANEL: CPT | Performed by: INTERNAL MEDICINE

## 2023-11-04 PROCEDURE — 82948 REAGENT STRIP/BLOOD GLUCOSE: CPT

## 2023-11-04 PROCEDURE — 94799 UNLISTED PULMONARY SVC/PX: CPT

## 2023-11-04 PROCEDURE — 94664 DEMO&/EVAL PT USE INHALER: CPT

## 2023-11-04 PROCEDURE — 99232 SBSQ HOSP IP/OBS MODERATE 35: CPT | Performed by: INTERNAL MEDICINE

## 2023-11-04 PROCEDURE — 63710000001 INSULIN LISPRO (HUMAN) PER 5 UNITS: Performed by: NURSE PRACTITIONER

## 2023-11-04 PROCEDURE — 99232 SBSQ HOSP IP/OBS MODERATE 35: CPT | Performed by: NURSE PRACTITIONER

## 2023-11-04 PROCEDURE — 25010000002 HEPARIN (PORCINE) PER 1000 UNITS: Performed by: INTERNAL MEDICINE

## 2023-11-04 PROCEDURE — 25010000002 CEFEPIME PER 500 MG: Performed by: INTERNAL MEDICINE

## 2023-11-04 RX ORDER — CARVEDILOL 12.5 MG/1
25 TABLET ORAL EVERY 12 HOURS SCHEDULED
Status: DISCONTINUED | OUTPATIENT
Start: 2023-11-04 | End: 2023-11-05 | Stop reason: HOSPADM

## 2023-11-04 RX ADMIN — Medication 10 ML: at 07:26

## 2023-11-04 RX ADMIN — Medication 10 ML: at 08:20

## 2023-11-04 RX ADMIN — HEPARIN SODIUM 5000 UNITS: 5000 INJECTION INTRAVENOUS; SUBCUTANEOUS at 21:04

## 2023-11-04 RX ADMIN — ACETAMINOPHEN 650 MG: 325 TABLET ORAL at 11:00

## 2023-11-04 RX ADMIN — FAMOTIDINE 20 MG: 20 TABLET, FILM COATED ORAL at 08:18

## 2023-11-04 RX ADMIN — AMLODIPINE BESYLATE 10 MG: 5 TABLET ORAL at 08:18

## 2023-11-04 RX ADMIN — HEPARIN SODIUM 5000 UNITS: 5000 INJECTION INTRAVENOUS; SUBCUTANEOUS at 14:39

## 2023-11-04 RX ADMIN — NEBIVOLOL HYDROCHLORIDE 10 MG: 10 TABLET ORAL at 08:18

## 2023-11-04 RX ADMIN — BUDESONIDE 0.5 MG: 0.5 INHALANT RESPIRATORY (INHALATION) at 18:47

## 2023-11-04 RX ADMIN — Medication 10 ML: at 21:04

## 2023-11-04 RX ADMIN — CEFEPIME 2000 MG: 2 INJECTION, POWDER, FOR SOLUTION INTRAVENOUS at 07:21

## 2023-11-04 RX ADMIN — ARFORMOTEROL TARTRATE 15 MCG: 15 SOLUTION RESPIRATORY (INHALATION) at 06:12

## 2023-11-04 RX ADMIN — INSULIN LISPRO 2 UNITS: 100 INJECTION, SOLUTION INTRAVENOUS; SUBCUTANEOUS at 21:04

## 2023-11-04 RX ADMIN — ARFORMOTEROL TARTRATE 15 MCG: 15 SOLUTION RESPIRATORY (INHALATION) at 18:47

## 2023-11-04 RX ADMIN — FUROSEMIDE 80 MG: 40 TABLET ORAL at 08:18

## 2023-11-04 RX ADMIN — HEPARIN SODIUM 5000 UNITS: 5000 INJECTION INTRAVENOUS; SUBCUTANEOUS at 05:01

## 2023-11-04 RX ADMIN — FUROSEMIDE 80 MG: 40 TABLET ORAL at 17:45

## 2023-11-04 RX ADMIN — ACETAMINOPHEN 650 MG: 325 TABLET ORAL at 22:23

## 2023-11-04 NOTE — NURSING NOTE
VSS, took over patient around 1300 no major problems or changes during shift. Continue plan of care.

## 2023-11-04 NOTE — PROGRESS NOTES
[facility]     Progress Note    Patient Name: Charlette Rai  : 1935  MRN: [id number]  Primary Care Physician:  Rafael Lyons MD  Date of admission: 10/29/2023    Pulmonary / Critical Care Progress Note      Patient Name: Charlette Rai  : 1935  MRN: [id number]  Attending:  Rafael Lyons MD   Date of admission: 10/29/2023    Subjective   Subjective   Follow-up for hypercapnic respiratory failure, CKD stage IV, volume overload    No acute events overnight.    This afternoon,  Lying in bed on room air  Overall feeling better  Occasional cough with scant sputum  Improving dyspnea with exertion  Tolerating diuretics  No chest pain or hemoptysis  No fever or chills  Remains weak and fatigued    Review of systems  General: Fatigue, otherwise denied complaints  HEENT: Denied complaints  Respiratory: Cough, dyspnea, otherwise denied complaints  Cardiovascular: Denied complaints  GI: Denied complaints  : Denied complaints  MSK: Weakness, otherwise denied complaints      Objective   Objective     Vitals:   Vital signs for last 24 hours:  Temp:  [97.8 °F (36.6 °C)-98.6 °F (37 °C)] 98.2 °F (36.8 °C)  Heart Rate:  [67-79] 67  Resp:  [16-18] 18  BP: (149-174)/(47-63) 153/47    Physical Exam   Vital Signs Reviewed  General: Elderly female, Alert, NAD, lying in bed.    HEENT:  PERRL, EOMI.  OP, nares clear  Chest:  good aeration, clear to auscultation bilaterally, tympanic to percussion bilaterally, no work of breathing noted on room air  CV: Systolic ejection murmur no MGR, pulses 2+, equal.  Abd:  Soft, NT, ND, + BS, no HSM  EXT:  no clubbing, no cyanosis, no edema  Neuro:  A&Ox3, CN grossly intact, no focal deficits.  Skin: No rashes or lesions noted    Result Review    Result Review:  I have personally reviewed the results from the time of this admission to 2023 15:27 EDT and agree with these findings:  [x]  Laboratory  [x]  Microbiology  [x]  Radiology  []  EKG/Telemetry   []  Cardiology/Vascular   []   Pathology  []  Old records  []  Other:  Most notable findings include:       Lab 11/04/23  0538 11/02/23  0541 11/01/23  0553 10/31/23  0313 10/30/23  0801 10/30/23  0249 10/29/23  0754 10/29/23  0450 10/29/23  0448   WBC  --  9.12 8.42 9.66  --  9.39  --   --  14.71*   HEMOGLOBIN  --  9.5* 9.8* 8.7*  --  7.9*  --   --  9.5*   HEMATOCRIT  --  30.7* 31.8* 28.0*  --  25.6*  --   --  32.2*   PLATELETS  --  221 230 231  --  209  --   --  307   SODIUM 140 143 146* 142  --  138  --   --  133*   SODIUM, ARTERIAL  --   --   --   --  139.3  --  133.9*   < >  --    POTASSIUM 3.8 4.2 3.4* 3.6  --  4.5  --   --  5.4*   CHLORIDE 100 103 103 106  --  107  --   --  100   CO2 28.0 29.9* 30.8* 23.8  --  17.6*  --   --  21.9*   BUN 74* 77* 80* 87*  --  78*  --   --  78*   CREATININE 2.15* 2.07* 2.17* 2.53*  --  2.48*  --   --  2.68*   GLUCOSE 104* 123* 146* 113*  --  198*  --   --  572*   GLUCOSE, ARTERIAL  --   --   --   --  193*  --  509*   < >  --    CALCIUM 8.5* 8.8 8.6 8.1*  --  8.2*  --   --  8.4*   PHOSPHORUS 4.0 3.8 3.8 3.7  --   --   --   --  5.9*   TOTAL PROTEIN  --   --   --   --   --  5.6*  --   --  7.5   ALBUMIN 3.0* 3.2*  --  3.3*  --  3.0*  --   --  4.2   GLOBULIN  --   --   --   --   --  2.6  --   --  3.3    < > = values in this interval not displayed.     Sputum culture growing Pseudomonas    Results for orders placed during the hospital encounter of 10/29/23    Adult Transthoracic Echo Complete W/ Cont if Necessary Per Protocol    Interpretation Summary    Left ventricular systolic function is normal. Calculated left ventricular EF = 56.8%    Left ventricular wall thickness is consistent with mild asymmetric hypertrophy.    Left ventricular diastolic function is consistent with (grade I) impaired relaxation.    Left atrial volume is moderately increased.    Moderate aortic valve stenosis is present.    Estimated right ventricular systolic pressure from tricuspid regurgitation is normal (<35 mmHg).    Mild dilation  of the aortic root is present.        Assessment & Plan   Assessment / Plan     Active Hospital Problems:  Active Hospital Problems    Diagnosis     **Acute respiratory failure with hypoxia and hypercapnia     COPD exacerbation     Anemia of chronic disease     Respiratory failure     Bilateral pneumonia     COPD with acute exacerbation     Pulmonary edema     Hyperkalemia     Type 2 diabetes mellitus without complication     Stage 4 chronic kidney disease      Impression:  Acute hypoxic hypercarbic respiratory failure requiring mechanical vent  COPD with acute exacerbation  Acute decompensated diastolic congestive heart failure  Acute on chronic hypoxic respiratory failure  Pseudomonas pneumonia  Therapeutic drug monitoring of antibiotics  History of lung cancer s/p lobectomy on the left side  Type 2 diabetes with hyperglycemia  Urinary tract infection  Elevated proBNP  Bilateral lower extremity edema  Anemia of CKD  CKD stage IV  Hyperkalemia resolved now with hypokalemia  Hyponatremia, clinically insignificant  Hypertension  Leukocytosis    Plan:  -Currently on room air.  May use O2 to maintain SPO2 greater than 90%.  Baseline 3 L per nasal cannula at at bedtime.  -Continue cefepime x7 days for Pseudomonas pneumonia, may transition to oral Levaquin at time of discharge.  -Continue Brovana, Pulmicort and as needed Xopenex  -Continue bronchopulmonary hygiene  -Encourage use of I-S and flutter valve  -Continue Lasix 80 mg p.o. twice daily  -Trend electrolytes and renal panel.  Replace electrolytes as needed.  -Encourage mobilization.  Out of bed to chair.  -PT/OT on board.  Appreciate assistance.  -Follow-up with pulmonology in 1 to 2 weeks after discharge  -Okay to discharge home from pulmonary standpoint    DVT prophylaxis:  Medical DVT prophylaxis orders are present.    CODE STATUS:   Code Status (Patient has no pulse and is not breathing): CPR (Attempt to Resuscitate)  Medical Interventions (Patient has pulse or  is breathing): Full Support    Labs, imaging, microbiology, notes and medications personally reviewed    Electronically signed by DAT Carballo, 11/04/23, 3:28 PM EDT.

## 2023-11-04 NOTE — PROGRESS NOTES
[facility]     Progress Note    Patient Name: Charlette Rai  : 1935  MRN: [id number]  Primary Care Physician:  Rafael Lyons MD  Date of admission: 10/29/2023      Subjective   Brief summary.  Patient admitted with acute respiratory failure to ICU, transferred out of ICU several days ago      HPI:  Blood pressure up  Does have home oxygen at 3 L.  No new issues,  Breathing better.  Out of bed in chair.  No shortness of breath today.      Review of Systems   Fatigue and weakness.  No fever chills  No cough.  .    Objective     Vitals:   Temp:  [97.8 °F (36.6 °C)-98.6 °F (37 °C)] 97.9 °F (36.6 °C)  Heart Rate:  [70-79] 70  Resp:  [16-18] 18  BP: (149-177)/(51-63) 162/56  Flow (L/min):  [1] 1    Physical Exam :     Elderly female not in acute distress.  Heart regular lungs diminished breath sounds bilaterally. .   Abdomen obese and soft.  Extremities with trace of edema.  Neurologically awake alert and oriented      Result Review:  I have personally reviewed the results from the time of this admission to 2023 14:08 EDT and agree with these findings:  [x]  Laboratory  []  Microbiology  []  Radiology  []  EKG/Telemetry   []  Cardiology/Vascular   []  Pathology  []  Old records  []  Other:      Pseudomonas in sputum culture  Hemoglobin around 9 g    Assessment / Plan       Active Hospital Problems:  Active Hospital Problems    Diagnosis     **Acute respiratory failure with hypoxia and hypercapnia     COPD exacerbation     Anemia of chronic disease     Respiratory failure     Bilateral pneumonia     COPD with acute exacerbation     Pulmonary edema     Hyperkalemia     Type 2 diabetes mellitus without complication     Stage 4 chronic kidney disease    Acute on chronic diastolic CHF.  Improving  Moderate aortic stenosis.  Lung cancer s/p XRT.  Chronic hypoxemic respiratory failure on home oxygen 3 L.    Plan:   Continue cefepime for Pseudomonas in sputum.  Switch to oral Levaquin on discharge for total of 1  week of treatment  Breathing status improving.  Hemoglobin stable, chronic anemia..  Continue home Lasix, Norvasc and Bystolic.  Sliding-scale insulin  Blood sugars improved.  Continue diuretics and electrolyte replacement per nephrology.  Appreciate consultants help.  Including nephrology and pulmonary  Increase activity.  Declined/refused nursing home or inpatient rehab, prefers to go home.    Discharge home in a.m.  Patient baseline creatinine around 2.2-2.6 and is stable..  Prefers home health       DVT prophylaxis:  Medical DVT prophylaxis orders are present.    CODE STATUS:   Code Status (Patient has no pulse and is not breathing): CPR (Attempt to Resuscitate)  Medical Interventions (Patient has pulse or is breathing): Full Support        Electronically signed by Patrick Turcios MD, 11/04/23, 2:10 PM EDT.  .

## 2023-11-04 NOTE — PROGRESS NOTES
[facility] Progress Note      Patient Name: Charlette Rai  : 1935  MRN: [id number]  Primary Care Physician:  Rafael Lyons MD  Date of admission: 10/29/2023    Subjective     Interval History:   F/u CKD4     Review of Systems:   Feels well denies dyspnea or nausea  BP been high    Objective     Vitals:   Temp:  [97.8 °F (36.6 °C)-98.6 °F (37 °C)] 97.9 °F (36.6 °C)  Heart Rate:  [70-79] 70  Resp:  [16-18] 18  BP: (149-177)/(51-63) 162/56  Flow (L/min):  [1] 1    Intake/Output Summary (Last 24 hours) at 2023 1324  Last data filed at 2023 0900  Gross per 24 hour   Intake 240 ml   Output --   Net 240 ml       Physical Exam:    General Appearance pleasant frail WF on RA no distress  Neck: supple, no JVD  Lungs: CTA bilat no rales  Heart: RRR, normal S1 and S2  Abdomen: soft, nontender, nondistended  Extremities: no edema, cyanosis or clubbing      Scheduled Meds:     amLODIPine, 10 mg, Oral, Q24H  arformoterol, 15 mcg, Nebulization, BID - RT  budesonide, 0.5 mg, Nebulization, BID - RT  cefepime, 2,000 mg, Intravenous, Q24H  famotidine, 20 mg, Oral, Daily  furosemide, 80 mg, Oral, BID  heparin (porcine), 5,000 Units, Subcutaneous, Q8H  insulin lispro, 2-7 Units, Subcutaneous, 4x Daily AC & at Bedtime  nebivolol, 10 mg, Oral, Q24H  senna-docusate sodium, 1 tablet, Oral, BID  sodium chloride, 10 mL, Intravenous, Q12H      IV Meds:   Pharmacy to Dose Cefepime,         Results Reviewed:   I have personally reviewed the results from the time of this admission to 2023 13:24 EDT     Results from last 7 days   Lab Units 23  0538 23  0541 23  0553 10/30/23  0801 10/30/23  0249 10/29/23  0450 10/29/23  0448   SODIUM mmol/L 140 143 146*   < > 138  --  133*   SODIUM, ARTERIAL   --   --   --    < >  --    < >  --    POTASSIUM mmol/L 3.8 4.2 3.4*   < > 4.5  --  5.4*   CHLORIDE mmol/L 100 103 103   < > 107  --  100   CO2 mmol/L 28.0 29.9* 30.8*   < > 17.6*  --  21.9*    BUN mg/dL 74* 77* 80*   < > 78*  --  78*   CREATININE mg/dL 2.15* 2.07* 2.17*   < > 2.48*  --  2.68*   CALCIUM mg/dL 8.5* 8.8 8.6   < > 8.2*  --  8.4*   BILIRUBIN mg/dL  --   --   --   --  0.2  --  0.2   ALK PHOS U/L  --   --   --   --  193*  --  310*   ALT (SGPT) U/L  --   --   --   --  18  --  27   AST (SGOT) U/L  --   --   --   --  18  --  26   GLUCOSE mg/dL 104* 123* 146*   < > 198*  --  572*   GLUCOSE, ARTERIAL   --   --   --    < >  --    < >  --     < > = values in this interval not displayed.     Estimated Creatinine Clearance: 19.4 mL/min (A) (by C-G formula based on SCr of 2.15 mg/dL (H)).  Results from last 7 days   Lab Units 11/04/23  0538 11/02/23  0541 11/01/23  0553 10/31/23  0313   MAGNESIUM mg/dL  --  1.8 2.2 2.0   PHOSPHORUS mg/dL 4.0 3.8 3.8 3.7         Results from last 7 days   Lab Units 11/02/23  0541 11/01/23  0553 10/31/23  0313 10/30/23  0249 10/29/23  0448   WBC 10*3/mm3 9.12 8.42 9.66 9.39 14.71*   HEMOGLOBIN g/dL 9.5* 9.8* 8.7* 7.9* 9.5*   PLATELETS 10*3/mm3 221 230 231 209 307           Assessment / Plan     ASSESSMENT:  - CKDIV due to diabetes, hypertension and previous contrast exposure, baseline creatinine between 2.2 and 2.6, here with acute respiratory failure with CHF and pulmonary edema.  Improved.  Cr stable low 2's currently.  Currently on higher dose of Lasix 80 mg p.o. twice daily (was on 40 mg twice daily) and appears euvolemic.    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  -- Etiology of rather sudden onset pulmonary edema is not clear, unsure if renal artery stenosis is a factor. considered adding spironolactone but she had transient hyperkalemia.  - Hypertension, uncontrolled still despite inc norvasc; on bystolic 10mg.  ACE/ARB are poor options   - Metabolic acidosis, likely due to renal failure, resolved.    - Hyperkalemia, resolved.    - Severe anemia, iron studies are adequate, transfuse as needed.  May need CASSANDRA.  - Bilateral  renal artery stenosis with history of hypertension.  Now blood pressure is reasonable.   - History of lung cancer, status post radiation.  - Diabetes, blood sugars are better.    - Congestive heart failure, 2D echo showed moderate aortic stenosis, EF 56% with LVH and grade 1 diastolic dysfunction.    PLAN:  Change bystolic to coreg 25 BID  Continue lasix 80 BID  Ok with discharge from nephrology standpoint, will arrange follow up 2 weeks with Dr García    Addendum: BP still high 11/5/23 AM, add hydralazine 25 BID      Dakota Morgan MD  11/04/23  13:24 EDT    [facility]\""  [phone]

## 2023-11-05 ENCOUNTER — READMISSION MANAGEMENT (OUTPATIENT)
Dept: CALL CENTER | Facility: HOSPITAL | Age: 88
End: 2023-11-05
Payer: MEDICARE

## 2023-11-05 VITALS
SYSTOLIC BLOOD PRESSURE: 134 MMHG | TEMPERATURE: 98.2 F | WEIGHT: 172.4 LBS | OXYGEN SATURATION: 94 % | DIASTOLIC BLOOD PRESSURE: 43 MMHG | HEART RATE: 69 BPM | RESPIRATION RATE: 18 BRPM | BODY MASS INDEX: 28.72 KG/M2 | HEIGHT: 65 IN

## 2023-11-05 PROBLEM — J96.01 ACUTE RESPIRATORY FAILURE WITH HYPOXIA AND HYPERCAPNIA: Status: RESOLVED | Noted: 2023-10-29 | Resolved: 2023-11-05

## 2023-11-05 PROBLEM — J96.02 ACUTE RESPIRATORY FAILURE WITH HYPOXIA AND HYPERCAPNIA: Status: RESOLVED | Noted: 2023-10-29 | Resolved: 2023-11-05

## 2023-11-05 PROBLEM — J96.90 RESPIRATORY FAILURE: Status: RESOLVED | Noted: 2023-10-29 | Resolved: 2023-11-05

## 2023-11-05 PROBLEM — J44.1 COPD EXACERBATION: Status: RESOLVED | Noted: 2023-11-02 | Resolved: 2023-11-05

## 2023-11-05 PROBLEM — J18.9 BILATERAL PNEUMONIA: Status: RESOLVED | Noted: 2023-10-29 | Resolved: 2023-11-05

## 2023-11-05 PROBLEM — J81.1 PULMONARY EDEMA: Status: RESOLVED | Noted: 2023-10-29 | Resolved: 2023-11-05

## 2023-11-05 PROBLEM — J44.1 COPD WITH ACUTE EXACERBATION: Status: RESOLVED | Noted: 2023-10-29 | Resolved: 2023-11-05

## 2023-11-05 PROBLEM — E87.5 HYPERKALEMIA: Status: RESOLVED | Noted: 2023-10-29 | Resolved: 2023-11-05

## 2023-11-05 LAB
ANION GAP SERPL CALCULATED.3IONS-SCNC: 10.3 MMOL/L (ref 5–15)
BUN SERPL-MCNC: 73 MG/DL (ref 8–23)
BUN/CREAT SERPL: 35.8 (ref 7–25)
CALCIUM SPEC-SCNC: 8.5 MG/DL (ref 8.6–10.5)
CHLORIDE SERPL-SCNC: 103 MMOL/L (ref 98–107)
CO2 SERPL-SCNC: 27.7 MMOL/L (ref 22–29)
CREAT SERPL-MCNC: 2.04 MG/DL (ref 0.57–1)
EGFRCR SERPLBLD CKD-EPI 2021: 23.4 ML/MIN/1.73
GLUCOSE BLDC GLUCOMTR-MCNC: 180 MG/DL (ref 70–99)
GLUCOSE BLDC GLUCOMTR-MCNC: 95 MG/DL (ref 70–99)
GLUCOSE SERPL-MCNC: 103 MG/DL (ref 65–99)
POTASSIUM SERPL-SCNC: 3.6 MMOL/L (ref 3.5–5.2)
SODIUM SERPL-SCNC: 141 MMOL/L (ref 136–145)

## 2023-11-05 PROCEDURE — 94799 UNLISTED PULMONARY SVC/PX: CPT

## 2023-11-05 PROCEDURE — 25010000002 CEFEPIME PER 500 MG: Performed by: INTERNAL MEDICINE

## 2023-11-05 PROCEDURE — 80048 BASIC METABOLIC PNL TOTAL CA: CPT | Performed by: INTERNAL MEDICINE

## 2023-11-05 PROCEDURE — 82948 REAGENT STRIP/BLOOD GLUCOSE: CPT

## 2023-11-05 PROCEDURE — 63710000001 INSULIN LISPRO (HUMAN) PER 5 UNITS: Performed by: NURSE PRACTITIONER

## 2023-11-05 PROCEDURE — 25010000002 HEPARIN (PORCINE) PER 1000 UNITS: Performed by: INTERNAL MEDICINE

## 2023-11-05 PROCEDURE — 94664 DEMO&/EVAL PT USE INHALER: CPT

## 2023-11-05 PROCEDURE — 99239 HOSP IP/OBS DSCHRG MGMT >30: CPT | Performed by: INTERNAL MEDICINE

## 2023-11-05 RX ORDER — CARVEDILOL 25 MG/1
25 TABLET ORAL EVERY 12 HOURS SCHEDULED
Qty: 60 TABLET | Refills: 0 | Status: SHIPPED | OUTPATIENT
Start: 2023-11-05 | End: 2023-11-05 | Stop reason: HOSPADM

## 2023-11-05 RX ORDER — HYDRALAZINE HYDROCHLORIDE 25 MG/1
25 TABLET, FILM COATED ORAL 2 TIMES DAILY
Qty: 60 TABLET | Refills: 0 | Status: ON HOLD | OUTPATIENT
Start: 2023-11-05 | End: 2023-12-05

## 2023-11-05 RX ORDER — HYDRALAZINE HYDROCHLORIDE 25 MG/1
25 TABLET, FILM COATED ORAL EVERY 12 HOURS SCHEDULED
Status: DISCONTINUED | OUTPATIENT
Start: 2023-11-05 | End: 2023-11-05 | Stop reason: HOSPADM

## 2023-11-05 RX ORDER — CARVEDILOL 25 MG/1
25 TABLET ORAL 2 TIMES DAILY WITH MEALS
Qty: 60 TABLET | Refills: 0 | Status: ON HOLD | OUTPATIENT
Start: 2023-11-05 | End: 2023-12-05

## 2023-11-05 RX ORDER — LEVOFLOXACIN 500 MG/1
500 TABLET, FILM COATED ORAL DAILY
Qty: 2 TABLET | Refills: 0 | Status: ON HOLD | OUTPATIENT
Start: 2023-11-06 | End: 2023-11-09

## 2023-11-05 RX ORDER — HYDRALAZINE HYDROCHLORIDE 25 MG/1
25 TABLET, FILM COATED ORAL 2 TIMES DAILY
Qty: 60 TABLET | Refills: 0 | Status: SHIPPED | OUTPATIENT
Start: 2023-11-05 | End: 2023-11-05 | Stop reason: HOSPADM

## 2023-11-05 RX ORDER — FUROSEMIDE 80 MG
80 TABLET ORAL 2 TIMES DAILY
Qty: 60 TABLET | Refills: 0 | Status: SHIPPED | OUTPATIENT
Start: 2023-11-05 | End: 2023-11-05 | Stop reason: HOSPADM

## 2023-11-05 RX ORDER — POTASSIUM CHLORIDE 750 MG/1
20 CAPSULE, EXTENDED RELEASE ORAL ONCE
Status: COMPLETED | OUTPATIENT
Start: 2023-11-05 | End: 2023-11-05

## 2023-11-05 RX ORDER — FUROSEMIDE 80 MG
80 TABLET ORAL 2 TIMES DAILY
Qty: 60 TABLET | Refills: 0 | Status: ON HOLD | OUTPATIENT
Start: 2023-11-05 | End: 2023-12-05

## 2023-11-05 RX ORDER — LEVOFLOXACIN 500 MG/1
500 TABLET, FILM COATED ORAL DAILY
Qty: 2 TABLET | Refills: 0 | Status: SHIPPED | OUTPATIENT
Start: 2023-11-06 | End: 2023-11-05 | Stop reason: HOSPADM

## 2023-11-05 RX ADMIN — AMLODIPINE BESYLATE 10 MG: 5 TABLET ORAL at 08:21

## 2023-11-05 RX ADMIN — Medication 10 ML: at 08:28

## 2023-11-05 RX ADMIN — CEFEPIME 2000 MG: 2 INJECTION, POWDER, FOR SOLUTION INTRAVENOUS at 08:21

## 2023-11-05 RX ADMIN — FAMOTIDINE 20 MG: 20 TABLET, FILM COATED ORAL at 08:21

## 2023-11-05 RX ADMIN — ARFORMOTEROL TARTRATE 15 MCG: 15 SOLUTION RESPIRATORY (INHALATION) at 06:27

## 2023-11-05 RX ADMIN — CARVEDILOL 25 MG: 12.5 TABLET, FILM COATED ORAL at 08:21

## 2023-11-05 RX ADMIN — FUROSEMIDE 80 MG: 40 TABLET ORAL at 08:21

## 2023-11-05 RX ADMIN — HEPARIN SODIUM 5000 UNITS: 5000 INJECTION INTRAVENOUS; SUBCUTANEOUS at 13:25

## 2023-11-05 RX ADMIN — HEPARIN SODIUM 5000 UNITS: 5000 INJECTION INTRAVENOUS; SUBCUTANEOUS at 05:42

## 2023-11-05 RX ADMIN — BUDESONIDE 0.5 MG: 0.5 INHALANT RESPIRATORY (INHALATION) at 06:27

## 2023-11-05 RX ADMIN — POTASSIUM CHLORIDE 20 MEQ: 10 CAPSULE, COATED, EXTENDED RELEASE ORAL at 12:08

## 2023-11-05 RX ADMIN — INSULIN LISPRO 2 UNITS: 100 INJECTION, SOLUTION INTRAVENOUS; SUBCUTANEOUS at 11:06

## 2023-11-05 RX ADMIN — HYDRALAZINE HYDROCHLORIDE 25 MG: 25 TABLET, FILM COATED ORAL at 08:21

## 2023-11-05 RX ADMIN — ACETAMINOPHEN 650 MG: 325 TABLET ORAL at 11:07

## 2023-11-05 NOTE — OUTREACH NOTE
Prep Survey      Flowsheet Row Responses   [facility] patient discharged from? [facility]   Is LACE score < 7 ? No   Eligibility [facility]   Date of Admission 10/29/23   Date of Discharge 11/05/23   Discharge Disposition Home or Self Care   Discharge diagnosis COPD with acute exacerbation, Bilateral pneumonia   Does the patient have one of the following disease processes/diagnoses(primary or secondary)? Pneumonia   Does the patient have Home health ordered? Yes   What is the Home health agency?  [facility]   Is there a DME ordered? No   Prep survey completed? Yes            Tamiko PAT - Registered Nurse

## 2023-11-05 NOTE — PLAN OF CARE
Goal Outcome Evaluation:      Pt alert and oriented x 4.  IV removed, discharge instructions went over with pt and family, voiced understanding.  Pt leaving facility via wheelchair.

## 2023-11-05 NOTE — DISCHARGE SUMMARY
[facility]        HOSPITALIST  DISCHARGE SUMMARY    Patient Name: Charlette Rai  : 1935  MRN: [id number]    Date of Admission: 10/29/2023  Date of Discharge:  2023  Primary Care Physician: Rafael Lyons MD    Consults       Date and Time Order Name Status Description    10/29/2023  9:42 AM Inpatient Nephrology Consult Completed     10/29/2023  6:02 AM Pulmonology (on-call MD unless specified)      10/29/2023  5:55 AM IP General Consult (Use specialty-specific consult if known)      10/29/2023  5:48 AM IP General Consult (Use specialty-specific consult if known)              Active and Resolved Hospital Problems:  Active Hospital Problems    Diagnosis POA    Anemia of chronic disease [D63.8] Yes    Type 2 diabetes mellitus without complication [E11.9] Yes    Stage 4 chronic kidney disease [N18.4] Yes      Resolved Hospital Problems    Diagnosis POA    **Acute respiratory failure with hypoxia and hypercapnia [J96.01, J96.02] Yes    COPD exacerbation [J44.1] Yes    Respiratory failure [J96.90] Yes    Bilateral pneumonia [J18.9] Yes    COPD with acute exacerbation [J44.1] Yes    Pulmonary edema [J81.1] Yes    Hyperkalemia [E87.5] Yes   Acute hypoxic hypercarbic respiratory failure requiring mechanical vent.  Improved   COPD with acute exacerbation.  Resolved  Acute decompensated diastolic congestive heart failure.  Resolved  Acute on chronic hypoxic respiratory failure.  Improved on home oxygen 3 L  Pseudomonas pneumonia  Therapeutic drug monitoring of antibiotics  History of lung cancer s/p lobectomy on the left side  Type 2 diabetes with hyperglycemia  Urinary tract infection  Bilateral lower extremity edema  Anemia of CKD  CKD stage IV.  Baseline creatinine 2.2-2.6.  Stable  Hyperkalemia resolved now with hypokalemia  Hyponatremia, clinically insignificant  Hypertension  Leukocytosis  Moderate aortic stenosis  Hospital Course     Hospital Course:  Charlette Rai is a 88 y.o. female  brought into ER by EMS for shortness of breath.  Patient was having trouble breathing for several days progressively got worse this morning.  Work-up in the ER revealed respiratory acidosis.  Patient intubated for worsening respiratory status.  Patient also found to have possible pneumonia and CHF.  Also noted sugars were high.  When I saw patient this morning in ICU patient is awake alert, intubated trying to respond to questions by writing.  Daughter and  at bedside.  There is no report of fever or chills.  Repeat ABGs has shown some improvement in pH.   After extubation patient moved to regular floor.  Sputum culture grew Pseudomonas and antibiotics were adjusted.  Patient seen by her nephrologist and intensivist.  Blood pressure medication adjusted as per nephrology's  Patient feeling much better and has refused to go to rehab.  Wants to go home.  Patient is down to 1 to 3 L which is close to her baseline.  Patient has cleared by pulmonary and nephrology to be discharged.      DISCHARGE Follow Up Recommendations for labs and diagnostics: Finish oral antibiotics.  Follow-up with PCP, nephrology and pulmonary.  Have BMP checked by PCP next few days.  Continue home oxygen.      Day of Discharge     Vital Signs:  Temp:  [97.3 °F (36.3 °C)-98.3 °F (36.8 °C)] 98.2 °F (36.8 °C)  Heart Rate:  [63-76] 69  Resp:  [16-18] 18  BP: (134-180)/(43-58) 134/43  Flow (L/min):  [1] 1    Physical Exam:     Elderly female not in acute distress.  Heart regular lungs diminished breath sounds bilaterally. .   Abdomen obese and soft.  Extremities with trace of edema.  Neurologically awake alert and oriented     Discharge Details        Discharge Medications        New Medications        Instructions Start Date   carvedilol 25 MG tablet  Commonly known as: COREG   25 mg, Oral, Every 12 Hours Scheduled      hydrALAZINE 25 MG tablet  Commonly known as: APRESOLINE   25 mg, Oral, 2 Times Daily      levoFLOXacin 500 MG tablet  Commonly  known as: Levaquin   500 mg, Oral, Daily   Start Date: November 6, 2023            Changes to Medications        Instructions Start Date   furosemide 80 MG tablet  Commonly known as: LASIX  What changed:   medication strength  when to take this  Another medication with the same name was removed. Continue taking this medication, and follow the directions you see here.   80 mg, Oral, 2 Times Daily             Continue These Medications        Instructions Start Date   albuterol sulfate  (90 Base) MCG/ACT inhaler  Commonly known as: PROVENTIL HFA;VENTOLIN HFA;PROAIR HFA   2 puffs, Inhalation, Every 4 Hours PRN      amLODIPine 10 MG tablet  Commonly known as: NORVASC   10 mg, Oral, Daily      aspirin 81 MG chewable tablet   81 mg, Oral, Daily      cilostazol 100 MG tablet  Commonly known as: PLETAL   1 tablet, Oral, 2 times daily      dexlansoprazole 60 MG capsule  Commonly known as: DEXILANT   60 mg, Oral, Daily      ergocalciferol 1.25 MG (77394 UT) capsule  Commonly known as: ERGOCALCIFEROL   50,000 Units, Oral, Every 14 Days      levocetirizine 5 MG tablet  Commonly known as: XYZAL   TAKE 1 TABLET DAILY      linagliptin 5 MG tablet tablet  Commonly known as: Tradjenta   5 mg, Oral, Daily      nitroglycerin 0.4 MG SL tablet  Commonly known as: NITROSTAT   Place 1 tablet under the tongue Every 5 (Five) Minutes As Needed for Chest Pain.      polycarbophil 625 MG tablet tablet   625 mg, Oral, Daily      rosuvastatin 20 MG tablet  Commonly known as: CRESTOR   20 mg, Oral, Daily      sevelamer 800 MG tablet  Commonly known as: RENVELA   800 mg, Oral, 3 Times Daily With Meals      Symbicort 160-4.5 MCG/ACT inhaler  Generic drug: budesonide-formoterol   2 puffs, Inhalation, 2 times daily             Stop These Medications      atorvastatin 40 MG tablet  Commonly known as: LIPITOR     doxycycline 100 MG capsule  Commonly known as: VIBRAMYCIN     nebivolol 10 MG tablet  Commonly known as: Bystolic     predniSONE 20 MG  tablet  Commonly known as: DELTASONE              No Known Allergies    Discharge Disposition:  Home[place]Health Care [facility] [place] with daughter in private vehicle    Diet:  Diet Instructions       Diet: Cardiac Diets, Renal Diets; Healthy Heart (2-3 Na+); Regular Texture (IDDSI 7); Thin (IDDSI 0); Low Sodium (2-3g), Low Phosphorus      Discharge Diet:  Cardiac Diets  Renal Diets       Cardiac Diet: Healthy Heart (2-3 Na+)    Texture: Regular Texture (IDDSI 7)    Fluid Consistency: Thin (IDDSI 0)    Renal Diet:  Low Sodium (2-3g)  Low Phosphorus               Discharge Activity:   Activity Instructions       Activity as Tolerated              CODE STATUS:  Code Status and Medical Interventions:   Ordered at: 10/29/23 0621     Code Status (Patient has no pulse and is not breathing):    CPR (Attempt to Resuscitate)     Medical Interventions (Patient has pulse or is breathing):    Full Support         Future Appointments   Date Time Provider Department Center   11/30/2023 11:00 AM [facility]omy [facility]ost APRN [facility]   1/19/2024  1:00 PM León Sanchez MD [facility]       Additional Instructions for the Follow-ups that You Need to Schedule       Discharge Follow-up with PCP   As directed       Currently Documented PCP:    Rafael Lyons MD    PCP Phone Number:    [phone]     Follow Up Details: 1 week        Discharge Follow-up with Specified Provider: Dr. García; 2 Weeks   As directed      To: Dr. García   Follow Up: 2 Weeks        Discharge Follow-up with Specified Provider: Dr. Holcomb; 3 Weeks   As directed      To: Dr. Holcomb   Follow Up: 3 Weeks                Pertinent  and/or Most Recent Results     PROCEDURES:   * Cannot find OR case *     LAB RESULTS:      Lab 11/02/23  0541 11/01/23  0553 10/31/23  0313 10/30/23  0801 10/30/23  0249   WBC 9.12 8.42 9.66  --  9.39   HEMOGLOBIN 9.5* 9.8* 8.7*  --  7.9*   HEMATOCRIT 30.7* 31.8* 28.0*  --  25.6*   PLATELETS 221 230 231  --  209   NEUTROS ABS  --   --   --   --   8.73*   IMMATURE GRANS (ABS)  --   --   --   --  0.06*   LYMPHS ABS  --   --   --   --  0.28*   MONOS ABS  --   --   --   --  0.31   EOS ABS  --   --   --   --  0.00   MCV 96.2 95.8 95.6  --  95.2   LACTATE, ARTERIAL  --   --   --  1.79  --      --   --   --   --          Lab 11/05/23  0526 11/04/23  0538 11/02/23  0541 11/01/23  0553 10/31/23  0313 10/30/23  1605 10/30/23  0801 10/30/23  0249   SODIUM 141 140 143 146* 142  --   --  138   SODIUM, ARTERIAL  --   --   --   --   --   --  139.3  --    POTASSIUM 3.6 3.8 4.2 3.4* 3.6  --   --  4.5   CHLORIDE 103 100 103 103 106  --   --  107   CO2 27.7 28.0 29.9* 30.8* 23.8  --   --  17.6*   ANION GAP 10.3 12.0 10.1 12.2 12.2  --   --  13.4   BUN 73* 74* 77* 80* 87*  --   --  78*   CREATININE 2.04* 2.15* 2.07* 2.17* 2.53*  --   --  2.48*   EGFR 23.4* 21.9* 23.0* 21.7* 18.0*  --   --  18.5*   GLUCOSE 103* 104* 123* 146* 113*  --   --  198*   GLUCOSE, ARTERIAL  --   --   --   --   --   --  193*  --    CALCIUM 8.5* 8.5* 8.8 8.6 8.1*  --   --  8.2*   IONIZED CALCIUM  --   --   --   --   --   --  1.16  --    MAGNESIUM  --   --  1.8 2.2 2.0  --   --  1.9   PHOSPHORUS  --  4.0 3.8 3.8 3.7  --   --   --    HEMOGLOBIN A1C  --   --   --   --   --  7.80*  --   --          Lab 11/04/23  0538 11/02/23  0541 10/31/23  0313 10/30/23  0249   TOTAL PROTEIN  --   --   --  5.6*   ALBUMIN 3.0* 3.2* 3.3* 3.0*   GLOBULIN  --   --   --  2.6   ALT (SGPT)  --   --   --  18   AST (SGOT)  --   --   --  18   BILIRUBIN  --   --   --  0.2   ALK PHOS  --   --   --  193*                 Lab 11/02/23  0541 10/31/23  0313   IRON  --  192*   IRON SATURATION (TSAT)  --  77*   TIBC  --  250*   TRANSFERRIN  --  168*   FERRITIN 1,987.00* 1,694.00*         Lab 10/30/23  0801 10/29/23  1639   PH, ARTERIAL 7.326* 7.359   PCO2, ARTERIAL 39.2 33.4*   PO2 ART 80.0 54.8*   O2 SATURATION ART 94.5* 89.0*   FIO2 30 30   HCO3 ART 20.0* 18.4*   BASE EXCESS ART -5.5* -6.2*   CARBOXYHEMOGLOBIN 0.3 0.1     Brief Urine  Lab Results  (Last result in the past 365 days)        Color   Clarity   Blood   Leuk Est   Nitrite   Protein   CREAT   Urine HCG        10/30/23 1540 Yellow   Clear   Trace   Trace   Negative   Negative                 Microbiology Results (last 10 days)       Procedure Component Value - Date/Time    Respiratory Panel PCR w/COVID-19(SARS-CoV-2) [facility] In-House, NP Swab in UTM/VTM, 3-4 HR TAT - Swab, Nasopharynx [[id number]]  (Normal) Collected: 10/29/23 1203    Lab Status: Final result Specimen: Swab from Nasopharynx Updated: 10/29/23 1317     ADENOVIRUS, PCR Not Detected     Coronavirus 229E Not Detected     Coronavirus HKU1 Not Detected     Coronavirus NL63 Not Detected     Coronavirus OC43 Not Detected     COVID19 Not Detected     Human Metapneumovirus Not Detected     Human Rhinovirus/Enterovirus Not Detected     Influenza A PCR Not Detected     Influenza B PCR Not Detected     Parainfluenza Virus 1 Not Detected     Parainfluenza Virus 2 Not Detected     Parainfluenza Virus 3 Not Detected     Parainfluenza Virus 4 Not Detected     RSV, PCR Not Detected     Bordetella pertussis pcr Not Detected     Bordetella parapertussis PCR Not Detected     Chlamydophila pneumoniae PCR Not Detected     Mycoplasma pneumo by PCR Not Detected    Narrative:      In the setting of a positive respiratory panel with a viral infection PLUS a negative procalcitonin without other underlying concern for bacterial infection, consider observing off antibiotics or discontinuation of antibiotics and continue supportive care. If the respiratory panel is positive for atypical bacterial infection (Bordetella pertussis, Chlamydophila pneumoniae, or Mycoplasma pneumoniae), consider antibiotic de-escalation to target atypical bacterial infection.    MRSA Screen, PCR (Inpatient) - Swab, Nares [[id number]]  (Normal) Collected: 10/29/23 1203    Lab Status: Final result Specimen: Swab from Nares Updated: 10/29/23 1336     MRSA PCR  No MRSA Detected    Narrative:      The negative predictive value of this diagnostic test is high and should only be used to consider de-escalating anti-MRSA therapy. A positive result may indicate colonization with MRSA and must be correlated clinically.    Respiratory Culture - Sputum, ET Suction [[id number]]  (Abnormal)  (Susceptibility) Collected: 10/29/23 0844    Lab Status: Final result Specimen: Sputum from ET Suction Updated: 11/01/23 0936     Respiratory Culture Rare Pseudomonas aeruginosa      No Normal Respiratory Tori     Gram Stain Rare (1+) Epithelial cells per low power field      Moderate (3+) WBCs per low power field      No organisms seen    Susceptibility        Pseudomonas aeruginosa      MARISA      Cefepime Susceptible      Ceftazidime Susceptible      Ciprofloxacin Susceptible      Gentamicin Susceptible      Levofloxacin Susceptible      Piperacillin + Tazobactam Susceptible                           Blood Culture - Blood, Arm, Right [[id number]]  (Normal) Collected: 10/29/23 0522    Lab Status: Final result Specimen: Blood from Arm, Right Updated: 11/03/23 0530     Blood Culture No growth at 5 days    Blood Culture - Blood, Arm, Left [[id number]]  (Normal) Collected: 10/29/23 0522    Lab Status: Final result Specimen: Blood from Arm, Left Updated: 11/03/23 0530     Blood Culture No growth at 5 days    COVID-19, FLU A/B, RSV PCR - Swab, Nasopharynx [[id number]]  (Normal) Collected: 10/29/23 0504    Lab Status: Final result Specimen: Swab from Nasopharynx Updated: 10/29/23 0609     COVID19 Not Detected     Influenza A PCR Not Detected     Influenza B PCR Not Detected     RSV, PCR Not Detected    Narrative:      Fact sheet for providers: https://www.fda.gov/media/743005/download    Fact sheet for patients: https://www.fda.gov/media/436244/download    Test performed by PCR.            XR Chest 1 View    Result Date: 10/29/2023  Impression:   1. Interval placement of endotracheal tube and orogastric  tubes which are in good position. 2. Increasing bilateral airspace disease favored to be secondary to pulmonary edema 3. Small left pleural effusion       OMAIRA GIRON MD       Electronically Signed and Approved By: OMAIRA GIRON MD on 10/29/2023 at 5:30              Results for orders placed during the hospital encounter of 06/27/22    Doppler Ankle Brachial Index Single Level CAR    Interpretation Summary  · Right Conclusion: The right GILLIAN is moderately reduced. Moderate digital ischemia.  · Left Conclusion: The left GILLIAN is mildly reduced. Mild digital ischemia.      Results for orders placed during the hospital encounter of 06/27/22    Doppler Ankle Brachial Index Single Level CAR    Interpretation Summary  · Right Conclusion: The right GILLIAN is moderately reduced. Moderate digital ischemia.  · Left Conclusion: The left GILLIAN is mildly reduced. Mild digital ischemia.      Results for orders placed during the hospital encounter of 10/29/23    Adult Transthoracic Echo Complete W/ Cont if Necessary Per Protocol    Interpretation Summary    Left ventricular systolic function is normal. Calculated left ventricular EF = 56.8%    Left ventricular wall thickness is consistent with mild asymmetric hypertrophy.    Left ventricular diastolic function is consistent with (grade I) impaired relaxation.    Left atrial volume is moderately increased.    Moderate aortic valve stenosis is present.    Estimated right ventricular systolic pressure from tricuspid regurgitation is normal (<35 mmHg).    Mild dilation of the aortic root is present.      Imaging Results (All)       Procedure Component Value Units Date/Time    XR Chest 1 View [[id number]] Collected: 10/29/23 0531     Updated: 10/29/23 0534    Narrative:      PROCEDURE: XR CHEST 1 VW     COMPARISON: [facility], CR, XR CHEST 2 VW, 1/11/2023, 12:43.  [facility], CT, CT CHEST WO CONTRAST DIAGNOSTIC, 3/01/2023, 15:47.      INDICATIONS: SOA. INTUBATION. OG TUBE     FINDINGS:   There has been interval placement of endotracheal tube with the tip 4 cm above the rebecca.    Orogastric tube is in place the tip projecting over the fundus of the stomach.  Patient is rotated   to left.  Heart size is within normal limits.  There is increasing airspace disease within both   lungs which may be secondary to pulmonary edema.  There is a small left pleural effusion.  There is   chronic left apical parenchymal scarring.  There is linear parenchymal scarring within the right   upper lobe.  No definite right pleural effusion.  No evidence pneumothorax.       Impression:         1. Interval placement of endotracheal tube and orogastric tubes which are in good position.  2. Increasing bilateral airspace disease favored to be secondary to pulmonary edema  3. Small left pleural effusion                  OMAIRA GIRON MD         Electronically Signed and Approved By: OMAIRA GIRON MD on 10/29/2023 at 5:30                              Labs Pending at Discharge:          Time spent on Discharge including face to face service: 31 minutes  Part of this note may be an electronic transcription/translation of spoken language to printed text using the Dragon Dictation System.     TElectronically signed by Patrick Turcios MD, 11/05/23, 2:06 PM EST.      Patient called posthospital discharge, we called her on Monday and again on Tuesday.  Scheduled appointment for follow-up.  Meds were reconciled.  Spoke to patient's daughter.  Patient somewhat confused according to her.  Advised to bring her to office and bring all meds.  Appointment scheduled for next week on 15 November..      Electronically signed by Rafael Lyons MD, 11/08/23, 7:33 PM EST.

## 2023-11-05 NOTE — PLAN OF CARE
Goal Outcome Evaluation:     Patient is A&Ox4. VSS. Patient was medicated x1 with tylenol for cramps to her right groin area. No questions, complaints, or concerns. No acute changes throughout shift. No change to plan of care.

## 2023-11-06 ENCOUNTER — TRANSITIONAL CARE MANAGEMENT TELEPHONE ENCOUNTER (OUTPATIENT)
Dept: CALL CENTER | Facility: HOSPITAL | Age: 88
End: 2023-11-06
Payer: MEDICARE

## 2023-11-06 NOTE — OUTREACH NOTE
Call Center TCM Note      Flowsheet Row Responses   [facility] [place], operated by [facility] patient discharged from? [facility]   Does the patient have one of the following disease processes/diagnoses(primary or secondary)? Pneumonia   TCM attempt successful? Yes   Call start time 1624   Call end time 1629   Discharge diagnosis COPD with acute exacerbation, Bilateral pneumonia   Meds reviewed with patient/caregiver? Yes   Is the patient having any side effects they believe may be caused by any medication additions or changes? No   Does the patient have all medications ordered at discharge? Yes   Is the patient taking all medications as directed (includes completed medication regime)? Yes   Comments New pt appt for Nov 30 with Romy Yost and will see if they will make sooner appt for pt.   Does the patient have an appointment with their PCP within 7-14 days of discharge? Other   What is the Home health agency?  [facility]   Has home health visited the patient within 72 hours of discharge? Call prior to 72 hours   Pulse Ox monitoring None   Did the patient receive a copy of their discharge instructions? Yes   Nursing interventions Reviewed instructions with patient   What is the patient's perception of their health status since discharge? Improving   Nursing Interventions Nurse provided patient education   Are the patient's immunizations up to date?  No   If the patient is a current smoker, are they able to teach back resources for cessation? Not a smoker   Is the patient/caregiver able to teach back the hierarchy of who to call/visit for symptoms/problems? PCP, Specialist, Home health nurse, Urgent Care, ED, 911 Yes   Patient reports what zone on this call? Green Zone   Green Zone Reports doing well, Breathing without shortness of breath   Green Zone interventions: Take daily medications, Use oxygen as prescribed   TCM call completed? Yes   Wrap up additional comments States she is not doing well.  Just doesn't feel well.  No issues with  soa.  Using 3L oxygen at night.  Does not have pulse ox and encouraged to purchase one.   Call end time [id number]            Vandana Hilliard LPN    11/6/2023, 16:31 EST

## 2023-11-06 NOTE — OUTREACH NOTE
Call Center TCM Note      Flowsheet Row Responses   [facility] facility patient discharged from? [facility]   Does the patient have one of the following disease processes/diagnoses(primary or secondary)? Pneumonia   TCM attempt successful? No   Unsuccessful attempts Attempt 1            Vandana Hilliard LPN    11/6/2023, 14:32 EST

## 2023-11-09 ENCOUNTER — APPOINTMENT (OUTPATIENT)
Dept: GENERAL RADIOLOGY | Facility: HOSPITAL | Age: 88
End: 2023-11-09
Payer: MEDICARE

## 2023-11-09 ENCOUNTER — READMISSION MANAGEMENT (OUTPATIENT)
Dept: CALL CENTER | Facility: HOSPITAL | Age: 88
End: 2023-11-09
Payer: MEDICARE

## 2023-11-09 ENCOUNTER — HOSPITAL ENCOUNTER (OUTPATIENT)
Facility: HOSPITAL | Age: 88
Setting detail: OBSERVATION
LOS: 4 days | Discharge: HOME HEALTH | End: 2023-11-14
Attending: EMERGENCY MEDICINE | Admitting: INTERNAL MEDICINE
Payer: MEDICARE

## 2023-11-09 DIAGNOSIS — N18.9 ACUTE RENAL FAILURE SUPERIMPOSED ON CHRONIC KIDNEY DISEASE, UNSPECIFIED ACUTE RENAL FAILURE TYPE, UNSPECIFIED CKD STAGE: Primary | ICD-10-CM

## 2023-11-09 DIAGNOSIS — R26.2 DIFFICULTY WALKING: ICD-10-CM

## 2023-11-09 DIAGNOSIS — N17.9 ACUTE RENAL FAILURE SUPERIMPOSED ON CHRONIC KIDNEY DISEASE, UNSPECIFIED ACUTE RENAL FAILURE TYPE, UNSPECIFIED CKD STAGE: Primary | ICD-10-CM

## 2023-11-09 DIAGNOSIS — G93.41 ACUTE METABOLIC ENCEPHALOPATHY: ICD-10-CM

## 2023-11-09 PROBLEM — N39.0 ACUTE UTI (URINARY TRACT INFECTION): Status: ACTIVE | Noted: 2023-11-09

## 2023-11-09 LAB
ALBUMIN SERPL-MCNC: 3.9 G/DL (ref 3.5–5.2)
ALBUMIN/GLOB SERPL: 1.2 G/DL
ALP SERPL-CCNC: 201 U/L (ref 39–117)
ALT SERPL W P-5'-P-CCNC: 22 U/L (ref 1–33)
ANION GAP SERPL CALCULATED.3IONS-SCNC: 14.4 MMOL/L (ref 5–15)
ANION GAP SERPL CALCULATED.3IONS-SCNC: 16.6 MMOL/L (ref 5–15)
ARTERIAL PATENCY WRIST A: ABNORMAL
AST SERPL-CCNC: 17 U/L (ref 1–32)
BACTERIA UR QL AUTO: ABNORMAL /HPF
BASE EXCESS BLDA CALC-SCNC: -3.9 MMOL/L (ref -2–2)
BASOPHILS # BLD AUTO: 0.05 10*3/MM3 (ref 0–0.2)
BASOPHILS NFR BLD AUTO: 0.7 % (ref 0–1.5)
BDY SITE: ABNORMAL
BILIRUB SERPL-MCNC: 0.3 MG/DL (ref 0–1.2)
BILIRUB UR QL STRIP: NEGATIVE
BUN SERPL-MCNC: 85 MG/DL (ref 8–23)
BUN SERPL-MCNC: 88 MG/DL (ref 8–23)
BUN/CREAT SERPL: 22.9 (ref 7–25)
BUN/CREAT SERPL: 25.1 (ref 7–25)
CA-I BLDA-SCNC: 1.15 MMOL/L (ref 1.13–1.32)
CALCIUM SPEC-SCNC: 8.6 MG/DL (ref 8.6–10.5)
CALCIUM SPEC-SCNC: 9.2 MG/DL (ref 8.6–10.5)
CHLORIDE BLDA-SCNC: 103 MMOL/L (ref 98–106)
CHLORIDE SERPL-SCNC: 102 MMOL/L (ref 98–107)
CHLORIDE SERPL-SCNC: 103 MMOL/L (ref 98–107)
CLARITY UR: CLEAR
CO2 SERPL-SCNC: 20.4 MMOL/L (ref 22–29)
CO2 SERPL-SCNC: 23.6 MMOL/L (ref 22–29)
COHGB MFR BLD: 0.1 % (ref 0–1.5)
COLOR UR: YELLOW
CREAT SERPL-MCNC: 3.38 MG/DL (ref 0.57–1)
CREAT SERPL-MCNC: 3.84 MG/DL (ref 0.57–1)
DEPRECATED RDW RBC AUTO: 46.1 FL (ref 37–54)
EGFRCR SERPLBLD CKD-EPI 2021: 10.9 ML/MIN/1.73
EGFRCR SERPLBLD CKD-EPI 2021: 12.7 ML/MIN/1.73
EOSINOPHIL # BLD AUTO: 0.08 10*3/MM3 (ref 0–0.4)
EOSINOPHIL NFR BLD AUTO: 1.2 % (ref 0.3–6.2)
ERYTHROCYTE [DISTWIDTH] IN BLOOD BY AUTOMATED COUNT: 13.2 % (ref 12.3–15.4)
FHHB: 6.9 % (ref 0–5)
FLUAV SUBTYP SPEC NAA+PROBE: NOT DETECTED
FLUBV RNA ISLT QL NAA+PROBE: NOT DETECTED
GAS FLOW AIRWAY: ABNORMAL L/MIN
GLOBULIN UR ELPH-MCNC: 3.3 GM/DL
GLUCOSE BLDA-MCNC: 84 MG/DL (ref 65–99)
GLUCOSE SERPL-MCNC: 90 MG/DL (ref 65–99)
GLUCOSE SERPL-MCNC: 94 MG/DL (ref 65–99)
GLUCOSE UR STRIP-MCNC: NEGATIVE MG/DL
HCO3 BLDA-SCNC: 20.4 MMOL/L (ref 22–26)
HCT VFR BLD AUTO: 29.3 % (ref 34–46.6)
HGB BLD-MCNC: 9.1 G/DL (ref 12–15.9)
HGB BLDA-MCNC: 10.1 G/DL (ref 11.7–14.6)
HGB UR QL STRIP.AUTO: NEGATIVE
HOLD SPECIMEN: NORMAL
HOLD SPECIMEN: NORMAL
HYALINE CASTS UR QL AUTO: ABNORMAL /LPF
IMM GRANULOCYTES # BLD AUTO: 0.03 10*3/MM3 (ref 0–0.05)
IMM GRANULOCYTES NFR BLD AUTO: 0.4 % (ref 0–0.5)
INHALED O2 CONCENTRATION: ABNORMAL %
KETONES UR QL STRIP: NEGATIVE
LACTATE BLDA-SCNC: 0.91 MMOL/L (ref 0.5–2)
LEUKOCYTE ESTERASE UR QL STRIP.AUTO: NEGATIVE
LYMPHOCYTES # BLD AUTO: 0.95 10*3/MM3 (ref 0.7–3.1)
LYMPHOCYTES NFR BLD AUTO: 13.7 % (ref 19.6–45.3)
MCH RBC QN AUTO: 29.7 PG (ref 26.6–33)
MCHC RBC AUTO-ENTMCNC: 31.1 G/DL (ref 31.5–35.7)
MCV RBC AUTO: 95.8 FL (ref 79–97)
METHGB BLD QL: 0.3 % (ref 0–1.5)
MODALITY: ABNORMAL
MONOCYTES # BLD AUTO: 1 10*3/MM3 (ref 0.1–0.9)
MONOCYTES NFR BLD AUTO: 14.4 % (ref 5–12)
MUCOUS THREADS URNS QL MICRO: ABNORMAL /HPF
NEUTROPHILS NFR BLD AUTO: 4.82 10*3/MM3 (ref 1.7–7)
NEUTROPHILS NFR BLD AUTO: 69.6 % (ref 42.7–76)
NITRITE UR QL STRIP: NEGATIVE
NOTE: ABNORMAL
NRBC BLD AUTO-RTO: 0 /100 WBC (ref 0–0.2)
OXYHGB MFR BLDV: 92.7 % (ref 94–99)
PCO2 BLDA: 34.3 MM HG (ref 35–45)
PH BLDA: 7.39 PH UNITS (ref 7.35–7.45)
PH UR STRIP.AUTO: 5.5 [PH] (ref 5–8)
PLATELET # BLD AUTO: 228 10*3/MM3 (ref 140–450)
PMV BLD AUTO: 10.7 FL (ref 6–12)
PO2 BLD: ABNORMAL MM[HG]
PO2 BLDA: 67.1 MM HG (ref 80–100)
POTASSIUM BLDA-SCNC: 4.19 MMOL/L (ref 3.5–5)
POTASSIUM SERPL-SCNC: 4.2 MMOL/L (ref 3.5–5.2)
POTASSIUM SERPL-SCNC: 4.4 MMOL/L (ref 3.5–5.2)
PROT SERPL-MCNC: 7.2 G/DL (ref 6–8.5)
PROT UR QL STRIP: ABNORMAL
RBC # BLD AUTO: 3.06 10*6/MM3 (ref 3.77–5.28)
RBC # UR STRIP: ABNORMAL /HPF
REF LAB TEST METHOD: ABNORMAL
RSV RNA NPH QL NAA+NON-PROBE: NOT DETECTED
SAO2 % BLDCOA: 93.1 % (ref 95–99)
SARS-COV-2 RNA RESP QL NAA+PROBE: NOT DETECTED
SODIUM BLDA-SCNC: 138.1 MMOL/L (ref 136–146)
SODIUM SERPL-SCNC: 140 MMOL/L (ref 136–145)
SODIUM SERPL-SCNC: 140 MMOL/L (ref 136–145)
SP GR UR STRIP: 1.02 (ref 1–1.03)
SQUAMOUS #/AREA URNS HPF: ABNORMAL /HPF
UROBILINOGEN UR QL STRIP: ABNORMAL
WBC # UR STRIP: ABNORMAL /HPF
WBC NRBC COR # BLD: 6.93 10*3/MM3 (ref 3.4–10.8)
WHOLE BLOOD HOLD COAG: NORMAL
WHOLE BLOOD HOLD SPECIMEN: NORMAL
YEAST URNS QL MICRO: ABNORMAL /HPF

## 2023-11-09 PROCEDURE — 82805 BLOOD GASES W/O2 SATURATION: CPT | Performed by: EMERGENCY MEDICINE

## 2023-11-09 PROCEDURE — 71045 X-RAY EXAM CHEST 1 VIEW: CPT

## 2023-11-09 PROCEDURE — 80053 COMPREHEN METABOLIC PANEL: CPT

## 2023-11-09 PROCEDURE — 93010 ELECTROCARDIOGRAM REPORT: CPT | Performed by: INTERNAL MEDICINE

## 2023-11-09 PROCEDURE — 87637 SARSCOV2&INF A&B&RSV AMP PRB: CPT | Performed by: EMERGENCY MEDICINE

## 2023-11-09 PROCEDURE — 25010000002 CEFTRIAXONE PER 250 MG: Performed by: EMERGENCY MEDICINE

## 2023-11-09 PROCEDURE — 96372 THER/PROPH/DIAG INJ SC/IM: CPT

## 2023-11-09 PROCEDURE — 82375 ASSAY CARBOXYHB QUANT: CPT | Performed by: EMERGENCY MEDICINE

## 2023-11-09 PROCEDURE — 94799 UNLISTED PULMONARY SVC/PX: CPT

## 2023-11-09 PROCEDURE — 94640 AIRWAY INHALATION TREATMENT: CPT

## 2023-11-09 PROCEDURE — 36600 WITHDRAWAL OF ARTERIAL BLOOD: CPT | Performed by: EMERGENCY MEDICINE

## 2023-11-09 PROCEDURE — 87086 URINE CULTURE/COLONY COUNT: CPT | Performed by: EMERGENCY MEDICINE

## 2023-11-09 PROCEDURE — 99284 EMERGENCY DEPT VISIT MOD MDM: CPT

## 2023-11-09 PROCEDURE — 81001 URINALYSIS AUTO W/SCOPE: CPT | Performed by: EMERGENCY MEDICINE

## 2023-11-09 PROCEDURE — 85025 COMPLETE CBC W/AUTO DIFF WBC: CPT

## 2023-11-09 PROCEDURE — 25810000003 SODIUM CHLORIDE 0.9 % SOLUTION: Performed by: EMERGENCY MEDICINE

## 2023-11-09 PROCEDURE — 93005 ELECTROCARDIOGRAM TRACING: CPT | Performed by: EMERGENCY MEDICINE

## 2023-11-09 PROCEDURE — 25010000002 ENOXAPARIN PER 10 MG: Performed by: INTERNAL MEDICINE

## 2023-11-09 PROCEDURE — 25010000002 CEFTRIAXONE PER 250 MG: Performed by: INTERNAL MEDICINE

## 2023-11-09 PROCEDURE — 25810000003 SODIUM CHLORIDE 0.9 % SOLUTION: Performed by: INTERNAL MEDICINE

## 2023-11-09 PROCEDURE — 83050 HGB METHEMOGLOBIN QUAN: CPT | Performed by: EMERGENCY MEDICINE

## 2023-11-09 RX ORDER — CEFTRIAXONE SODIUM 1 G/50ML
1000 INJECTION, SOLUTION INTRAVENOUS EVERY 24 HOURS
Status: COMPLETED | OUTPATIENT
Start: 2023-11-09 | End: 2023-11-11

## 2023-11-09 RX ORDER — ACETAMINOPHEN 650 MG/1
650 SUPPOSITORY RECTAL EVERY 4 HOURS PRN
Status: DISCONTINUED | OUTPATIENT
Start: 2023-11-09 | End: 2023-11-14 | Stop reason: HOSPADM

## 2023-11-09 RX ORDER — ACETAMINOPHEN 160 MG/5ML
650 SOLUTION ORAL EVERY 4 HOURS PRN
Status: DISCONTINUED | OUTPATIENT
Start: 2023-11-09 | End: 2023-11-14 | Stop reason: HOSPADM

## 2023-11-09 RX ORDER — CEFTRIAXONE SODIUM 1 G/50ML
1000 INJECTION, SOLUTION INTRAVENOUS ONCE
Status: COMPLETED | OUTPATIENT
Start: 2023-11-09 | End: 2023-11-09

## 2023-11-09 RX ORDER — HYDRALAZINE HYDROCHLORIDE 25 MG/1
25 TABLET, FILM COATED ORAL 2 TIMES DAILY
Status: DISCONTINUED | OUTPATIENT
Start: 2023-11-09 | End: 2023-11-14 | Stop reason: HOSPADM

## 2023-11-09 RX ORDER — SODIUM CHLORIDE 0.9 % (FLUSH) 0.9 %
10 SYRINGE (ML) INJECTION AS NEEDED
Status: DISCONTINUED | OUTPATIENT
Start: 2023-11-09 | End: 2023-11-14 | Stop reason: HOSPADM

## 2023-11-09 RX ORDER — SODIUM CHLORIDE 9 MG/ML
100 INJECTION, SOLUTION INTRAVENOUS CONTINUOUS
Status: DISCONTINUED | OUTPATIENT
Start: 2023-11-09 | End: 2023-11-10

## 2023-11-09 RX ORDER — ENOXAPARIN SODIUM 100 MG/ML
30 INJECTION SUBCUTANEOUS DAILY
Status: DISCONTINUED | OUTPATIENT
Start: 2023-11-09 | End: 2023-11-14 | Stop reason: HOSPADM

## 2023-11-09 RX ORDER — CILOSTAZOL 100 MG/1
100 TABLET ORAL 2 TIMES DAILY
Status: DISCONTINUED | OUTPATIENT
Start: 2023-11-09 | End: 2023-11-14 | Stop reason: HOSPADM

## 2023-11-09 RX ORDER — SODIUM CHLORIDE 9 MG/ML
40 INJECTION, SOLUTION INTRAVENOUS AS NEEDED
Status: DISCONTINUED | OUTPATIENT
Start: 2023-11-09 | End: 2023-11-14 | Stop reason: HOSPADM

## 2023-11-09 RX ORDER — ASPIRIN 81 MG/1
81 TABLET, CHEWABLE ORAL DAILY
Status: DISCONTINUED | OUTPATIENT
Start: 2023-11-09 | End: 2023-11-14 | Stop reason: HOSPADM

## 2023-11-09 RX ORDER — IPRATROPIUM BROMIDE AND ALBUTEROL SULFATE 2.5; .5 MG/3ML; MG/3ML
3 SOLUTION RESPIRATORY (INHALATION) ONCE
Status: COMPLETED | OUTPATIENT
Start: 2023-11-09 | End: 2023-11-09

## 2023-11-09 RX ORDER — NITROGLYCERIN 0.4 MG/1
0.4 TABLET SUBLINGUAL
Status: DISCONTINUED | OUTPATIENT
Start: 2023-11-09 | End: 2023-11-14 | Stop reason: HOSPADM

## 2023-11-09 RX ORDER — SODIUM CHLORIDE 0.9 % (FLUSH) 0.9 %
10 SYRINGE (ML) INJECTION EVERY 12 HOURS SCHEDULED
Status: DISCONTINUED | OUTPATIENT
Start: 2023-11-09 | End: 2023-11-14 | Stop reason: HOSPADM

## 2023-11-09 RX ORDER — BISACODYL 5 MG/1
5 TABLET, DELAYED RELEASE ORAL DAILY PRN
Status: DISCONTINUED | OUTPATIENT
Start: 2023-11-09 | End: 2023-11-14 | Stop reason: HOSPADM

## 2023-11-09 RX ORDER — HYDROCODONE BITARTRATE AND ACETAMINOPHEN 5; 325 MG/1; MG/1
1 TABLET ORAL EVERY 4 HOURS PRN
Status: DISCONTINUED | OUTPATIENT
Start: 2023-11-09 | End: 2023-11-14 | Stop reason: HOSPADM

## 2023-11-09 RX ORDER — CARVEDILOL 12.5 MG/1
25 TABLET ORAL 2 TIMES DAILY WITH MEALS
Status: DISCONTINUED | OUTPATIENT
Start: 2023-11-09 | End: 2023-11-14 | Stop reason: HOSPADM

## 2023-11-09 RX ORDER — FAMOTIDINE 10 MG
10 TABLET ORAL DAILY
Status: DISCONTINUED | OUTPATIENT
Start: 2023-11-09 | End: 2023-11-14 | Stop reason: HOSPADM

## 2023-11-09 RX ORDER — BISACODYL 10 MG
10 SUPPOSITORY, RECTAL RECTAL DAILY PRN
Status: DISCONTINUED | OUTPATIENT
Start: 2023-11-09 | End: 2023-11-14 | Stop reason: HOSPADM

## 2023-11-09 RX ORDER — AMOXICILLIN 250 MG
2 CAPSULE ORAL NIGHTLY
Status: DISCONTINUED | OUTPATIENT
Start: 2023-11-09 | End: 2023-11-14 | Stop reason: HOSPADM

## 2023-11-09 RX ORDER — ONDANSETRON 2 MG/ML
4 INJECTION INTRAMUSCULAR; INTRAVENOUS EVERY 6 HOURS PRN
Status: DISCONTINUED | OUTPATIENT
Start: 2023-11-09 | End: 2023-11-14 | Stop reason: HOSPADM

## 2023-11-09 RX ORDER — ACETAMINOPHEN 325 MG/1
650 TABLET ORAL EVERY 4 HOURS PRN
Status: DISCONTINUED | OUTPATIENT
Start: 2023-11-09 | End: 2023-11-14 | Stop reason: HOSPADM

## 2023-11-09 RX ORDER — POLYETHYLENE GLYCOL 3350 17 G/17G
17 POWDER, FOR SOLUTION ORAL DAILY PRN
Status: DISCONTINUED | OUTPATIENT
Start: 2023-11-09 | End: 2023-11-14 | Stop reason: HOSPADM

## 2023-11-09 RX ORDER — NALOXONE HCL 0.4 MG/ML
0.4 VIAL (ML) INJECTION
Status: DISCONTINUED | OUTPATIENT
Start: 2023-11-09 | End: 2023-11-14 | Stop reason: HOSPADM

## 2023-11-09 RX ADMIN — FAMOTIDINE 10 MG: 10 TABLET ORAL at 09:58

## 2023-11-09 RX ADMIN — HYDROCODONE BITARTRATE AND ACETAMINOPHEN 1 TABLET: 5; 325 TABLET ORAL at 20:32

## 2023-11-09 RX ADMIN — LINAGLIPTIN 5 MG: 5 TABLET, FILM COATED ORAL at 09:58

## 2023-11-09 RX ADMIN — CARVEDILOL 25 MG: 12.5 TABLET, FILM COATED ORAL at 17:57

## 2023-11-09 RX ADMIN — CILOSTAZOL 100 MG: 100 TABLET ORAL at 20:32

## 2023-11-09 RX ADMIN — DOCUSATE SODIUM 50MG AND SENNOSIDES 8.6MG 2 TABLET: 8.6; 5 TABLET, FILM COATED ORAL at 20:32

## 2023-11-09 RX ADMIN — HYDRALAZINE HYDROCHLORIDE 25 MG: 25 TABLET, FILM COATED ORAL at 20:31

## 2023-11-09 RX ADMIN — CEFTRIAXONE SODIUM 1000 MG: 1 INJECTION, SOLUTION INTRAVENOUS at 20:31

## 2023-11-09 RX ADMIN — ENOXAPARIN SODIUM 30 MG: 100 INJECTION SUBCUTANEOUS at 09:57

## 2023-11-09 RX ADMIN — IPRATROPIUM BROMIDE AND ALBUTEROL SULFATE 3 ML: .5; 3 SOLUTION RESPIRATORY (INHALATION) at 05:02

## 2023-11-09 RX ADMIN — Medication 10 ML: at 20:32

## 2023-11-09 RX ADMIN — ASPIRIN 81 MG CHEWABLE TABLET 81 MG: 81 TABLET CHEWABLE at 09:58

## 2023-11-09 RX ADMIN — CEFTRIAXONE SODIUM 1000 MG: 1 INJECTION, SOLUTION INTRAVENOUS at 05:46

## 2023-11-09 RX ADMIN — HYDRALAZINE HYDROCHLORIDE 25 MG: 25 TABLET, FILM COATED ORAL at 09:57

## 2023-11-09 RX ADMIN — CARVEDILOL 25 MG: 12.5 TABLET, FILM COATED ORAL at 09:57

## 2023-11-09 RX ADMIN — SODIUM CHLORIDE 100 ML/HR: 9 INJECTION, SOLUTION INTRAVENOUS at 09:01

## 2023-11-09 RX ADMIN — SODIUM CHLORIDE 1000 ML: 9 INJECTION, SOLUTION INTRAVENOUS at 05:44

## 2023-11-09 RX ADMIN — SODIUM CHLORIDE 100 ML/HR: 9 INJECTION, SOLUTION INTRAVENOUS at 18:12

## 2023-11-09 RX ADMIN — CILOSTAZOL 100 MG: 100 TABLET ORAL at 09:58

## 2023-11-09 NOTE — ED PROVIDER NOTES
Time: 4:07 AM EST  Date of encounter:  11/9/2023  Independent Historian/Clinical History and Information was obtained by:   Patient, Family, and Chart    History is limited by: Altered Mental Status    Chief Complaint: Altered mental status      History of Present Illness:  Patient is a 88 y.o. year old female who presents to the emergency department for evaluation of altered mental status    The patient was discharged from the hospital 3 days ago on Sunday doing well at that time but over the past 24 to 48 hours she seems increasingly confused.  She does not seem to know that she is left the hospital.  She has had no appetite.  She states she has a bad taste in her mouth.  She has had nausea but no vomiting.  Mild shortness of breath.  She continues to have mild confusion.  She complains of diffuse myalgias and fatigue.    HPI    Patient Care Team  Primary Care Provider: Rafael Lyons MD    Past Medical History:     No Known Allergies  Past Medical History:   Diagnosis Date    Allergic rhinitis     Anemia     Arthritis     Asthma     USE INHALERS AND NEBULIZERS    Back pain     Bladder disorder     Cancer     LEFT LUNG CANCER 2005 SURGERY AND CHEMO DONE  AND CURRENTLY 4/ 14/22  RIGHT LUNG CANCER HAS ONLY RECEIVED RADIATION THUS FAR    Chronic kidney disease     stage 3    CKD (chronic kidney disease) stage 4, GFR 15-29 ml/min     Condition not found     ulcer    Congestive heart failure (CHF)     FOLLOWED BY DR AQUINO. DENIES CP BUT DOES HAVE SOA CHRONIC ISSUE COPD/LUNG CANCER    COPD (chronic obstructive pulmonary disease)     FOLLOWED BY DR MARIAJOSE BAILEY    Coronary artery disease     DENIES CP BUT DOES GET SOA MOST OF THE TIME WITH EXERTION BUT OCC AT REST CHRONIC ISSUE COPD/LUNG CANCER    Deep vein thrombosis     Diabetes mellitus     DOES NOT CHECK BS DAILY    Disease of thyroid gland     HYPOTHYROIDISM    Essential hypertension     Gastric ulcer     GERD (gastroesophageal reflux disease)     Heart murmur     History  of transfusion     NO ISSUES POST TRANSFUSION WAS MANY YEARS AGO    Hyperlipemia     Leukocytopenia     FOLLOWED BY DR MIR BAILEY    Limb swelling     Lumbago     Lumbar spinal stenosis     Lung cancer     Migraine headache     Multiple joint pain     On home oxygen therapy     3L/NC PRN    Osteopenia     Reflux esophagitis     Shortness of breath     Thyroid nodule     HAS ULTRASOUND YEARLY BEING MONITORED    Vascular disease     Vitamin D deficiency      Past Surgical History:   Procedure Laterality Date    ABDOMINAL SURGERY      APPENDECTOMY      ARTERIOVENOUS FISTULA/SHUNT SURGERY Left 05/10/2022    Procedure: Left basilic vein transposition;  Surgeon: Wan Barksdale MD;  Location: [facility] MAIN OR;  Service: Vascular;  Laterality: Left;    ARTERIOVENOUS FISTULA/SHUNT SURGERY Left 3/23/2023    Procedure: Ligation of left arm arteriovenous fistula;  Surgeon: Wan Barksdale MD;  Location: [facility] MAIN OR;  Service: Vascular;  Laterality: Left;    CARDIAC CATHETERIZATION  1996    CARDIAC SURGERY      CARDIAC SURGERY      fluid drained from heart    CATARACT EXTRACTION, BILATERAL  2003    COLONOSCOPY  2014    ENDOSCOPY  2016 2019    FEMORAL ARTERY STENT Bilateral     HYSTERECTOMY      LUNG BIOPSY Left 2005    lobectomy upper lung caner    LUNG VOLUME REDUCTION      OTHER SURGICAL HISTORY      artifical joints/limbs    REPLACEMENT TOTAL KNEE Left 2016    UPPER GASTROINTESTINAL ENDOSCOPY       Family History   Problem Relation Age of Onset    Arthritis Mother     Arthritis Father     Cancer Brother     Diabetes Brother     Other Brother         blood disease     Prostate cancer Brother     Cancer Brother     Prostate cancer Brother     Cancer Brother     Cancer Brother     Malig Hyperthermia Neg Hx     Colon cancer Neg Hx        Home Medications:  Prior to Admission medications    Medication Sig Start Date End Date Taking? Authorizing Provider   albuterol sulfate  (90 Base) MCG/ACT inhaler Inhale 2 puffs Every  4 (Four) Hours As Needed for Wheezing.    Sarah Parker MD   amLODIPine (NORVASC) 10 MG tablet Take 1 tablet by mouth Daily. 12/13/22   León Sanchez MD   aspirin 81 MG chewable tablet Chew 1 tablet Daily.    Sarah Parker MD   carvedilol (Coreg) 25 MG tablet Take 1 tablet by mouth 2 (Two) Times a Day With Meals for 30 days. 11/5/23 12/5/23  Patrick Turcios MD   cilostazol (PLETAL) 100 MG tablet Take 1 tablet by mouth 2 (two) times a day. 4/17/21   Sarah Parker MD   dexlansoprazole (DEXILANT) 60 MG capsule Take 1 capsule by mouth Daily. 9/23/22   Sarah Parker MD   ergocalciferol (ERGOCALCIFEROL) 1.25 MG (63441 UT) capsule Take 1 capsule by mouth Every 14 (Fourteen) Days. 3/4/22   Emergency, Nurse Epic, RN   furosemide (Lasix) 80 MG tablet Take 1 tablet by mouth 2 (Two) Times a Day for 30 days. 11/5/23 12/5/23  Patrick Turcios MD   hydrALAZINE (APRESOLINE) 25 MG tablet Take 1 tablet by mouth 2 (Two) Times a Day for 30 days. 11/5/23 12/5/23  Patrick Turcios MD   levocetirizine (XYZAL) 5 MG tablet TAKE 1 TABLET DAILY 6/23/23   Paloma James MD   levoFLOXacin (Levaquin) 500 MG tablet Take 1 tablet by mouth Daily for 2 days. 11/6/23 11/8/23  Patrick Turcios MD   linagliptin (Tradjenta) 5 MG tablet tablet Take 1 tablet by mouth Daily. 1/10/22   Paloma James MD   nitroglycerin (NITROSTAT) 0.4 MG SL tablet Place 1 tablet under the tongue Every 5 (Five) Minutes As Needed for Chest Pain.    Sarah Parker MD   polycarbophil (calcium polycarbophil) 625 MG tablet tablet Take 1 tablet by mouth Daily.    Sarah Parker MD   rosuvastatin (CRESTOR) 20 MG tablet Take 1 tablet by mouth Daily. 7/7/23   León Sanchez MD   sevelamer (RENVELA) 800 MG tablet Take 1 tablet by mouth 3 (Three) Times a Day With Meals. 10/9/23   Provider, MD Sarah   Symbicort 160-4.5 MCG/ACT inhaler Inhale 2 puffs 2 (two) times a day. 5/17/21   ProviderSarah MD     "    Social History:   Social History     Tobacco Use    Smoking status: Former     Packs/day: 1.00     Years: 30.00     Additional pack years: 0.00     Total pack years: 30.00     Types: Cigarettes     Start date:      Quit date: 2004     Years since quittin.3    Smokeless tobacco: Never   Vaping Use    Vaping Use: Never used   Substance Use Topics    Alcohol use: Never    Drug use: Never         Review of Systems:  Review of Systems   Constitutional:  Positive for activity change, appetite change and fatigue. Negative for chills and fever.   HENT:  Negative for congestion, ear pain and sore throat.    Eyes:  Negative for pain.   Respiratory:  Positive for shortness of breath. Negative for cough and chest tightness.    Cardiovascular:  Negative for chest pain.   Gastrointestinal:  Positive for nausea. Negative for abdominal pain, diarrhea and vomiting.   Genitourinary:  Negative for flank pain and hematuria.   Musculoskeletal:  Negative for joint swelling.   Skin:  Negative for pallor.   Neurological:  Positive for weakness. Negative for seizures and headaches.   All other systems reviewed and are negative.       Physical Exam:  /47 (BP Location: Right arm, Patient Position: Lying)   Pulse 76   Temp 97.9 °F (36.6 °C) (Oral)   Resp 20   Ht 165.1 cm (65\")   Wt 64.4 kg (141 lb 15.6 oz)   LMP  (LMP Unknown)   SpO2 94%   BMI 23.63 kg/m²     Physical Exam  Vitals and nursing note reviewed.   Constitutional:       General: She is not in acute distress.     Appearance: Normal appearance. She is not toxic-appearing.      Comments: Mildly uncomfortable and mildly confused, but able to converse in answer questions appropriately but slowly.   HENT:      Head: Normocephalic and atraumatic.      Jaw: There is normal jaw occlusion.   Eyes:      General: Lids are normal.      Extraocular Movements: Extraocular movements intact.      Conjunctiva/sclera: Conjunctivae normal.      Pupils: Pupils are equal, " round, and reactive to light.   Cardiovascular:      Rate and Rhythm: Normal rate and regular rhythm.      Pulses: Normal pulses.      Heart sounds: Murmur heard.   Pulmonary:      Effort: Pulmonary effort is normal. No respiratory distress.      Breath sounds: Normal breath sounds. No wheezing or rhonchi.   Abdominal:      General: Abdomen is flat.      Palpations: Abdomen is soft.      Tenderness: There is no abdominal tenderness. There is no guarding or rebound.   Musculoskeletal:         General: Normal range of motion.      Cervical back: Normal range of motion and neck supple.      Right lower leg: No edema.      Left lower leg: No edema.   Skin:     General: Skin is warm and dry.   Neurological:      Mental Status: She is alert and oriented to person, place, and time. Mental status is at baseline.      Comments: NIHSS = 0   Psychiatric:         Mood and Affect: Mood normal.                Procedures:  Procedures      Medical Decision Making:      Comorbidities that affect care:    Lung cancer, GERD, COPD, hypertension, congestive heart failure, diabetes, chronic kidney disease    External Notes reviewed:    Hospital Discharge Summary: Discharge summary 11/5/2023 for acute respiratory failure with hypoxia and hypercapnia      The following orders were placed and all results were independently analyzed by me:  Orders Placed This Encounter   Procedures    COVID-19, FLU A/B, RSV PCR - Swab, Nasopharynx    Urine Culture - Urine,    XR Chest 1 View    [place] Draw    Comprehensive Metabolic Panel    CBC Auto Differential    ABG with Co-Ox and Electrolytes    Urinalysis With Culture If Indicated - Urine, Clean Catch    Urinalysis, Microscopic Only - Urine, Clean Catch    Basic Metabolic Panel    Basic Metabolic Panel    Diet: Cardiac Diets, Diabetic Diets, Renal Diets; Healthy Heart (2-3 Na+); Consistent Carbohydrate; Low Potassium, Low Phosphorus; Texture: Regular Texture (IDDSI 7); Fluid Consistency: Thin (IDDSI  0)    Continuous Pulse Oximetry    Vital Signs    Discontinue Cardiac Monitoring    Vital Signs    Activity - Ad Tania    Intake & Output    Weigh Patient    Oral Care    Saline Lock & Maintain IV Access    Code Status and Medical Interventions:    Internal Medicine (on-call MD unless specified)    Inpatient Case Management  Consult    Inpatient Nutrition Consult    OT Consult: Eval & Treat    PT Consult: Eval & Treat Functional Mobility Below Baseline    Oxygen Therapy- Nasal Cannula; Titrate 1-6 LPM Per SpO2; 90 - 95%    Oxygen Therapy- Nasal Cannula; Titrate 1-6 LPM Per SpO2; 90 - 95%    POC Glucose Once    ECG 12 Lead Altered Mental Status    Insert Peripheral IV    Insert Peripheral IV    Inpatient Admission    Fall Precautions    CBC & Differential    Green Top (Gel)    Lavender Top    Gold Top - SST    Light Blue Top       Medications Given in the Emergency Department:  Medications   sodium chloride 0.9 % flush 10 mL (has no administration in time range)   carvedilol (COREG) tablet 25 mg (25 mg Oral Given 11/9/23 1757)   aspirin chewable tablet 81 mg (81 mg Oral Given 11/9/23 0958)   cilostazol (PLETAL) tablet 100 mg (100 mg Oral Given 11/9/23 2032)   hydrALAZINE (APRESOLINE) tablet 25 mg (25 mg Oral Given 11/9/23 2031)   nitroglycerin (NITROSTAT) SL tablet 0.4 mg (has no administration in time range)   linagliptin (TRADJENTA) tablet 5 mg (5 mg Oral Given 11/9/23 0958)   sodium chloride 0.9 % flush 10 mL (10 mL Intravenous Given 11/9/23 2032)   sodium chloride 0.9 % flush 10 mL (has no administration in time range)   sodium chloride 0.9 % infusion 40 mL (has no administration in time range)   acetaminophen (TYLENOL) tablet 650 mg (has no administration in time range)     Or   acetaminophen (TYLENOL) 160 MG/5ML oral solution 650 mg (has no administration in time range)     Or   acetaminophen (TYLENOL) suppository 650 mg (has no administration in time range)   HYDROcodone-acetaminophen (NORCO)  5-325 MG per tablet 1 tablet (1 tablet Oral Given 11/9/23 2032)   HYDROmorphone (DILAUDID) injection 0.5 mg (has no administration in time range)     And   naloxone (NARCAN) injection 0.4 mg (has no administration in time range)   famotidine (PEPCID) tablet 10 mg (10 mg Oral Given 11/9/23 0958)   sennosides-docusate (PERICOLACE) 8.6-50 MG per tablet 2 tablet (2 tablets Oral Given 11/9/23 2032)     And   polyethylene glycol (MIRALAX) packet 17 g (has no administration in time range)     And   bisacodyl (DULCOLAX) EC tablet 5 mg (has no administration in time range)     And   bisacodyl (DULCOLAX) suppository 10 mg (has no administration in time range)   ondansetron (ZOFRAN) injection 4 mg (has no administration in time range)   Pharmacy to Dose enoxaparin (LOVENOX) (has no administration in time range)   sodium chloride 0.9 % infusion (100 mL/hr Intravenous New Bag 11/9/23 1812)   Enoxaparin Sodium (LOVENOX) syringe 30 mg (30 mg Subcutaneous Given 11/9/23 0957)   cefTRIAXone (ROCEPHIN) IVPB 1,000 mg (1,000 mg Intravenous New Bag 11/9/23 2031)   ipratropium-albuterol (DUO-NEB) nebulizer solution 3 mL (3 mL Nebulization Given 11/9/23 0502)   sodium chloride 0.9 % bolus 1,000 mL (1,000 mL Intravenous New Bag 11/9/23 0544)   cefTRIAXone (ROCEPHIN) IVPB 1,000 mg (1,000 mg Intravenous New Bag 11/9/23 0546)        ED Course:         Labs:    Lab Results (last 24 hours)       Procedure Component Value Units Date/Time    Basic Metabolic Panel [[id number]]  (Abnormal) Collected: 11/09/23 0905    Specimen: Blood from Hand, Right Updated: 11/09/23 0942     Glucose 94 mg/dL      BUN 85 mg/dL      Creatinine 3.38 mg/dL      Sodium 140 mmol/L      Potassium 4.2 mmol/L      Chloride 103 mmol/L      CO2 20.4 mmol/L      Calcium 8.6 mg/dL      BUN/Creatinine Ratio 25.1     Anion Gap 16.6 mmol/L      eGFR 12.7 mL/min/1.73      Comment: <15 Indicative of kidney failure       Narrative:      GFR Normal >60  Chronic Kidney Disease  <60  Kidney Failure <15    The GFR formula is only valid for adults with stable renal function between ages 18 and 70.    Basic Metabolic Panel [[id number]]  (Abnormal) Collected: 11/10/23 0612    Specimen: Blood from Hand, Left Updated: 11/10/23 0650     Glucose 103 mg/dL      BUN 73 mg/dL      Creatinine 2.66 mg/dL      Sodium 142 mmol/L      Potassium 3.8 mmol/L      Chloride 109 mmol/L      CO2 20.8 mmol/L      Calcium 8.5 mg/dL      BUN/Creatinine Ratio 27.4     Anion Gap 12.2 mmol/L      eGFR 17.0 mL/min/1.73     Narrative:      GFR Normal >60  Chronic Kidney Disease <60  Kidney Failure <15    The GFR formula is only valid for adults with stable renal function between ages 18 and 70.             Imaging:    No Radiology Exams Resulted Within Past 24 Hours      Differential Diagnosis and Discussion:    Altered Mental Status: Based on the patient's signs and symptoms, differential diagnosis includes but is not limited to meningitis, stroke, sepsis, subarachnoid hemorrhage, intracranial bleeding, encephalitis, and metabolic encephalopathy.    All labs were reviewed and interpreted by me.  All X-rays impressions were independently interpreted by me.  EKG was interpreted by me.    MDM     Amount and/or Complexity of Data Reviewed  Decide to obtain previous medical records or to obtain history from someone other than the patient: yes         Critical Care Note: Total Critical Care time of 35 minutes. Total critical care time documented does not include time spent on separately billed procedures for services of nurses or physician assistants. I personally saw and examined the patient. I have reviewed all diagnostic interpretations and treatment plans as written. I was present for the key portions of any procedures performed and the inclusive time noted in any critical care statement. Critical care time includes patient management by me, time spent at the patients bedside,  time to review lab and imaging results,  discussing patient care, documentation in the medical record, and time spent with family or caregiver.    Patient Care Considerations:          Consultants/Shared Management Plan:    I discussed patient with Dr. Lyons who agrees to admission.    Social Determinants of Health:    Patient has presented with family members who are responsible, reliable and will ensure follow up care.      Disposition and Care Coordination:    Admit:   Through independent evaluation of the patient's history, physical, and imperical data, the patient meets criteria for observation/admission to the hospital.        Final diagnoses:   Acute renal failure superimposed on chronic kidney disease, unspecified acute renal failure type, unspecified CKD stage   Acute metabolic encephalopathy        ED Disposition       ED Disposition   Decision to Admit    Condition   --    Comment   Level of Care: Med/Surg [1]   Diagnosis: Acute on chronic renal failure [866104]   Admitting Physician: TAWANNA LYONS [[id number]]   Attending Physician: TAWANNA LYONS [[id number]]   Certification: I Certify That Inpatient Hospital Services Are Medically Necessary For Greater Than 2 Midnights                 This medical record created using voice recognition software.             Ildefonso Hilton MD  11/10/23 0656

## 2023-11-09 NOTE — PLAN OF CARE
Goal Outcome Evaluation:  Plan of Care Reviewed With: patient        Progress: improving  Outcome Evaluation: Patient new admission from ED. alert and oriented no c/o pain. IV fluids started and maintained. encouraged PO intake. turned and repositioned to prevent skin breakdown. no other changes at this time.

## 2023-11-09 NOTE — H&P
[facility]   HISTORY AND PHYSICAL    Patient Name: Charlette Rai  : 1935  MRN: [id number]  Primary Care Physician:  Rafael Lyons MD  Date of admission: 2023    Subjective   Subjective     Chief Complaint:   Increased confusion and weakness      HPI:    Charlette Rai is a 88 y.o. female who was discharged from hospital post respiratory failure and treatment.  Patient declined and refused inpatient rehab and was discharged on .  Return to ER last night with increasing confusion, daughter found her very confused not eating not drinking.  Complains of increased pains and fatigue.  When examined today patient is awake alert family at bedside feeling much better.        Review of Systems:    Increased confusion.  Fatigue.  No fever chills.  No shortness of breath.  Poor appetite and poor p.o. intake  No nausea vomiting or diarrhea    Personal History     Past Medical History:   Diagnosis Date    Allergic rhinitis     Anemia     Arthritis     Asthma     USE INHALERS AND NEBULIZERS    Back pain     Bladder disorder     Cancer     LEFT LUNG CANCER  SURGERY AND CHEMO DONE  AND CURRENTLY   RIGHT LUNG CANCER HAS ONLY RECEIVED RADIATION THUS FAR    Chronic kidney disease     stage 3    CKD (chronic kidney disease) stage 4, GFR 15-29 ml/min     Condition not found     ulcer    Congestive heart failure (CHF)     FOLLOWED BY DR AQUINO. DENIES CP BUT DOES HAVE SOA CHRONIC ISSUE COPD/LUNG CANCER    COPD (chronic obstructive pulmonary disease)     FOLLOWED BY DR MARIAJOSE BAILEY    Coronary artery disease     DENIES CP BUT DOES GET SOA MOST OF THE TIME WITH EXERTION BUT OCC AT REST CHRONIC ISSUE COPD/LUNG CANCER    Deep vein thrombosis     Diabetes mellitus     DOES NOT CHECK BS DAILY    Disease of thyroid gland     HYPOTHYROIDISM    Essential hypertension     Gastric ulcer     GERD (gastroesophageal reflux disease)     Heart murmur     History of transfusion     NO ISSUES POST TRANSFUSION WAS MANY YEARS  AGO    Hyperlipemia     Leukocytopenia     FOLLOWED BY DR MIR BAILEY    Limb swelling     Lumbago     Lumbar spinal stenosis     Lung cancer     Migraine headache     Multiple joint pain     On home oxygen therapy     3L/NC PRN    Osteopenia     Reflux esophagitis     Shortness of breath     Thyroid nodule     HAS ULTRASOUND YEARLY BEING MONITORED    Vascular disease     Vitamin D deficiency        Past Surgical History:   Procedure Laterality Date    ABDOMINAL SURGERY      APPENDECTOMY      ARTERIOVENOUS FISTULA/SHUNT SURGERY Left 05/10/2022    Procedure: Left basilic vein transposition;  Surgeon: Wan Barksdale MD;  Location: [facility] MAIN OR;  Service: Vascular;  Laterality: Left;    ARTERIOVENOUS FISTULA/SHUNT SURGERY Left 3/23/2023    Procedure: Ligation of left arm arteriovenous fistula;  Surgeon: Wan Barksdale MD;  Location: [facility] MAIN OR;  Service: Vascular;  Laterality: Left;    CARDIAC CATHETERIZATION  1996    CARDIAC SURGERY      CARDIAC SURGERY      fluid drained from heart    CATARACT EXTRACTION, BILATERAL  2003    COLONOSCOPY  2014    ENDOSCOPY  2016 2019    FEMORAL ARTERY STENT Bilateral     HYSTERECTOMY      LUNG BIOPSY Left 2005    lobectomy upper lung caner    LUNG VOLUME REDUCTION      OTHER SURGICAL HISTORY      artifical joints/limbs    REPLACEMENT TOTAL KNEE Left 2016    UPPER GASTROINTESTINAL ENDOSCOPY         Family History: family history includes Arthritis in her father and mother; Cancer in her brother, brother, brother, and brother; Diabetes in her brother; Other in her brother; Prostate cancer in her brother and brother. Otherwise pertinent FHx was reviewed and not pertinent to current issue.    Social History:  reports that she quit smoking about 19 years ago. Her smoking use included cigarettes. She started smoking about 71 years ago. She has a 30.00 pack-year smoking history. She has never used smokeless tobacco. She reports that she does not drink alcohol and does not use  drugs.    Home Medications:  albuterol sulfate HFA, amLODIPine, aspirin, budesonide-formoterol, carvedilol, cilostazol, dexlansoprazole, ergocalciferol, furosemide, hydrALAZINE, levocetirizine, linagliptin, nitroglycerin, polycarbophil, rosuvastatin, and sevelamer      Allergies:  No Known Allergies    Objective   Objective     Vitals:   Temp:  [98.1 °F (36.7 °C)-98.6 °F (37 °C)] 98.6 °F (37 °C)  Heart Rate:  [76-92] 87  Resp:  [11-21] 16  BP: (130-165)/(35-60) 142/38    Physical Exam    Elderly female not in acute distress  Oral mucosa dry  Neck supple  Heart regular  Lungs clear, diminished breath sounds  Abdomen is soft nontender  Extremities trace of edema  Neuro awake alert and oriented today at baseline      I have personally reviewed the results from the time of this admission to 11/9/2023 19:13 EST and agree with these findings:  []  Laboratory  []  Microbiology  []  Radiology  []  EKG/Telemetry   []  Cardiology/Vascular   []  Pathology  []  Old records  []  Other:    CBC:    WBC   Date Value Ref Range Status   11/09/2023 6.93 3.40 - 10.80 10*3/mm3 Final     RBC   Date Value Ref Range Status   11/09/2023 3.06 (L) 3.77 - 5.28 10*6/mm3 Final     Hemoglobin   Date Value Ref Range Status   11/09/2023 9.1 (L) 12.0 - 15.9 g/dL Final     Hematocrit   Date Value Ref Range Status   11/09/2023 29.3 (L) 34.0 - 46.6 % Final     MCV   Date Value Ref Range Status   11/09/2023 95.8 79.0 - 97.0 fL Final     MCH   Date Value Ref Range Status   11/09/2023 29.7 26.6 - 33.0 pg Final     MCHC   Date Value Ref Range Status   11/09/2023 31.1 (L) 31.5 - 35.7 g/dL Final     RDW   Date Value Ref Range Status   11/09/2023 13.2 12.3 - 15.4 % Final     RDW-SD   Date Value Ref Range Status   11/09/2023 46.1 37.0 - 54.0 fl Final     MPV   Date Value Ref Range Status   11/09/2023 10.7 6.0 - 12.0 fL Final     Platelets   Date Value Ref Range Status   11/09/2023 228 140 - 450 10*3/mm3 Final     Neutrophil %   Date Value Ref Range Status    11/09/2023 69.6 42.7 - 76.0 % Final     Lymphocyte %   Date Value Ref Range Status   11/09/2023 13.7 (L) 19.6 - 45.3 % Final     Monocyte %   Date Value Ref Range Status   11/09/2023 14.4 (H) 5.0 - 12.0 % Final     Eosinophil %   Date Value Ref Range Status   11/09/2023 1.2 0.3 - 6.2 % Final     Basophil %   Date Value Ref Range Status   11/09/2023 0.7 0.0 - 1.5 % Final     Immature Grans %   Date Value Ref Range Status   11/09/2023 0.4 0.0 - 0.5 % Final     Neutrophils, Absolute   Date Value Ref Range Status   11/09/2023 4.82 1.70 - 7.00 10*3/mm3 Final     Lymphocytes, Absolute   Date Value Ref Range Status   11/09/2023 0.95 0.70 - 3.10 10*3/mm3 Final     Monocytes, Absolute   Date Value Ref Range Status   11/09/2023 1.00 (H) 0.10 - 0.90 10*3/mm3 Final     Eosinophils, Absolute   Date Value Ref Range Status   11/09/2023 0.08 0.00 - 0.40 10*3/mm3 Final     Basophils, Absolute   Date Value Ref Range Status   11/09/2023 0.05 0.00 - 0.20 10*3/mm3 Final     Immature Grans, Absolute   Date Value Ref Range Status   11/09/2023 0.03 0.00 - 0.05 10*3/mm3 Final     nRBC   Date Value Ref Range Status   11/09/2023 0.0 0.0 - 0.2 /100 WBC Final        BMP:    Lab Results   Component Value Date    GLUCOSE 94 11/09/2023    BUN 85 (H) 11/09/2023    CREATININE 3.38 (H) 11/09/2023    EGFRIFNONA 17 (L) 01/10/2022    BCR 25.1 (H) 11/09/2023    K 4.2 11/09/2023    CO2 20.4 (L) 11/09/2023    CALCIUM 8.6 11/09/2023    ALBUMIN 3.9 11/09/2023    LABIL2 1.5 05/05/2021    AST 17 11/09/2023    ALT 22 11/09/2023        XR Chest 1 View    Result Date: 11/9/2023   Favorable progression is suggested radiographically since 10/29/2023 with decreased bilateral airspace opacities.  Minimal if any airspace disease persists on the current study.  The findings may represent interval improvement in pulmonary edema or infectious multifocal pneumonia.  The patient has been extubated since 10/29/2023.      Please note that portions of this note were  completed with a voice recognition program.  OLMAN BAIG JR, MD       Electronically Signed and Approved By: OLMAN BAIG JR, MD on 11/09/2023 at 5:44                      Assessment & Plan   Assessment / Plan       Current Diagnosis:  Active Hospital Problems    Diagnosis     **Acute on chronic kidney failure     Acute UTI (urinary tract infection)     Anemia of chronic disease     Chronic obstructive pulmonary disease     Type 2 diabetes mellitus without complication      Plan:   Patient admitted to hospital recently and discharged post treatment for respiratory failure.  Patient and family declined nursing home or rehab placement.  At this point she is readmitted for increasing confusion and worsening renal functions.  Patient has poor appetite.  At this point we will hydrate her with IV fluids monitor kidney function.  Confusion appears to be related to worsening kidney functions.  PT and OT consult.  Urinalysis with possible UTI will follow cultures at this point we will continue with Rocephin.    Further management will based on clinical course and lab results, discussed with patient and her  and daughter at bedside      DVT prophylaxis:  Medical DVT prophylaxis orders are present.    GI Prophylaxis:       Pepcid    CODE STATUS:    Code Status (Patient has no pulse and is not breathing): CPR (Attempt to Resuscitate)  Medical Interventions (Patient has pulse or is breathing): Full Support    Admission Status:  I believe this patient meets inpatient status.             I have dictated this note utilizing Dragon Dictation.             Please note that portions of this note were completed with a voice recognition program.             Part of this note may be an electronic transcription/translation of spoken language to printed text         using the Dragon Dictation System.       Electronically signed by Rafael Lyons MD, 11/09/23, 5:39 AM EST.    Total time spent with in evaluation and management:

## 2023-11-09 NOTE — CASE MANAGEMENT/SOCIAL WORK
Patient was discharged home with [facility] 11/5 after refusing  rehab post hospitalization requiring ICU stay, intubation and mechanical ventilation.      Patient is not in respiratory distress but is currently confused and not appropriate for COPD education.  Admission ABG results reveiwed; ph and CO2 acceptable, 7.39/34.3/67.1/20.4.      I will follow up when patient is more appropriate for education.

## 2023-11-09 NOTE — OUTREACH NOTE
COPD/PN Week 2 Survey      Flowsheet Row Responses   [facility] patient discharged from? [facility]   Does the patient have one of the following disease processes/diagnoses(primary or secondary)? Pneumonia   Week 2 attempt successful? No   Unsuccessful attempts Attempt 1   Revoke Readmitted            Juliana H - Registered Nurse

## 2023-11-09 NOTE — CONSULTS
Nutrition Services    Patient Name: Charlette Rai  YOB: 1935  MRN: [id number]  Admission date: 11/9/2023      CLINICAL NUTRITION ASSESSMENT      Reason for Assessment  Identified at risk by screening criteria, MST score 2+, Physician consult     H&P:    Past Medical History:   Diagnosis Date    Allergic rhinitis     Anemia     Arthritis     Asthma     USE INHALERS AND NEBULIZERS    Back pain     Bladder disorder     Cancer     LEFT LUNG CANCER 2005 SURGERY AND CHEMO DONE  AND CURRENTLY 4/ 14/22  RIGHT LUNG CANCER HAS ONLY RECEIVED RADIATION THUS FAR    Chronic kidney disease     stage 3    CKD (chronic kidney disease) stage 4, GFR 15-29 ml/min     Condition not found     ulcer    Congestive heart failure (CHF)     FOLLOWED BY DR AQUINO. DENIES CP BUT DOES HAVE SOA CHRONIC ISSUE COPD/LUNG CANCER    COPD (chronic obstructive pulmonary disease)     FOLLOWED BY DR MARIAJOSE BAILEY    Coronary artery disease     DENIES CP BUT DOES GET SOA MOST OF THE TIME WITH EXERTION BUT OCC AT REST CHRONIC ISSUE COPD/LUNG CANCER    Deep vein thrombosis     Diabetes mellitus     DOES NOT CHECK BS DAILY    Disease of thyroid gland     HYPOTHYROIDISM    Essential hypertension     Gastric ulcer     GERD (gastroesophageal reflux disease)     Heart murmur     History of transfusion     NO ISSUES POST TRANSFUSION WAS MANY YEARS AGO    Hyperlipemia     Leukocytopenia     FOLLOWED BY DR MIR BAILEY    Limb swelling     Lumbago     Lumbar spinal stenosis     Lung cancer     Migraine headache     Multiple joint pain     On home oxygen therapy     3L/NC PRN    Osteopenia     Reflux esophagitis     Shortness of breath     Thyroid nodule     HAS ULTRASOUND YEARLY BEING MONITORED    Vascular disease     Vitamin D deficiency         Current Problems:   Active Hospital Problems    Diagnosis     **Acute on chronic renal failure         Nutrition/Diet History         Narrative     88-year-old female arrived in the ED with altered mental status.   "Discharged from the hospital 3 days ago.  See past medical history above.    Followed by RD related to MST score = 3 due to reduced oral intake over the last month, unsure of weight history, and reports a poor appetite.  Patient reports a bad taste in her mouth.    Last documented bowel movement 11/08/2023.  Bowel regimen ordered as needed Mikki-Colace, MiraLAX, Dulcolax.    BMI is healthy weight for age.  Patient is 112% of ideal body weight 125 pounds 10 ounces.  Weight stable past 7 months.       Anthropometrics        Current Height, Weight Height: 165.1 cm (65\")  Weight: 64.4 kg (141 lb 15.6 oz)   Current BMI Body mass index is 23.63 kg/m².       Weight Hx  Wt Readings from Last 30 Encounters:   11/09/23 0405 64.4 kg (141 lb 15.6 oz)   10/29/23 1223 78.2 kg (172 lb 6.4 oz)   10/29/23 0529 72.3 kg (159 lb 6.3 oz)   10/23/23 1500 69.8 kg (153 lb 14.1 oz)   07/07/23 1054 66.7 kg (147 lb)   03/23/23 0802 66 kg (145 lb 8.1 oz)   03/21/23 1315 65.8 kg (145 lb)   12/13/22 1118 66.7 kg (147 lb)   10/07/22 1259 68.5 kg (151 lb)   08/29/22 0832 68 kg (150 lb)   06/27/22 0912 70.3 kg (155 lb)   05/10/22 0558 70.5 kg (155 lb 6.8 oz)   04/14/22 1315 72.5 kg (159 lb 13.3 oz)   04/08/22 2116 72.1 kg (159 lb)   03/08/22 1305 74.4 kg (164 lb)   02/08/22 1252 74.7 kg (164 lb 9.6 oz)   01/10/22 1443 74.7 kg (164 lb 9.6 oz)   12/08/21 1027 73.9 kg (163 lb)   12/06/21 1039 74.8 kg (165 lb)   08/02/21 1104 75.4 kg (166 lb 3.2 oz)   06/23/21 1120 75.7 kg (166 lb 14.2 oz)   06/21/21 1106 74.9 kg (165 lb 2 oz)   06/16/21 1148 74.8 kg (164 lb 14.5 oz)   06/14/21 1057 75.1 kg (165 lb 9.1 oz)   05/13/21 0000 73 kg (161 lb)   05/07/21 0000 75.5 kg (166 lb 6 oz)   04/16/21 0000 76.3 kg (168 lb 4 oz)   03/26/21 0000 75.3 kg (166 lb)   03/08/21 0000 75.3 kg (166 lb)   02/23/21 0000 77.2 kg (170 lb 2 oz)   01/12/21 0000 76.7 kg (169 lb 3 oz)   12/22/20 0000 75.7 kg (167 lb)            Wt Change Observation Weight stable x7 months. "     Estimated/Assessed Needs       Energy Requirements    EST Needs (kcal/day) 30 kcals per kilogram = 1932 erasmo       Protein Requirements    EST Daily Needs (g/day) 1 g/kg = 64.4 g of protein       Fluid Requirements     Estimated Needs (mL/day) 30 mL/kg = 1932 ml (8 to 9 cups)     Labs/Medications         Pertinent Labs Reviewed.   Results from last 7 days   Lab Units 11/09/23  0905 11/09/23  0437 11/09/23 0421 11/05/23  0526   SODIUM mmol/L 140  --  140 141   SODIUM, ARTERIAL mmol/L  --  138.1  --   --    POTASSIUM mmol/L 4.2  --  4.4 3.6   CHLORIDE mmol/L 103  --  102 103   CO2 mmol/L 20.4*  --  23.6 27.7   BUN mg/dL 85*  --  88* 73*   CREATININE mg/dL 3.38*  --  3.84* 2.04*   CALCIUM mg/dL 8.6  --  9.2 8.5*   BILIRUBIN mg/dL  --   --  0.3  --    ALK PHOS U/L  --   --  201*  --    ALT (SGPT) U/L  --   --  22  --    AST (SGOT) U/L  --   --  17  --    GLUCOSE mg/dL 94  --  90 103*   GLUCOSE, ARTERIAL mg/dL  --  84  --   --      Results from last 7 days   Lab Units 11/09/23 0421 11/04/23  0538   PHOSPHORUS mg/dL  --  4.0   HEMOGLOBIN g/dL 9.1*  --    HEMATOCRIT % 29.3*  --      COVID19   Date Value Ref Range Status   11/09/2023 Not Detected Not Detected - Ref. Range Final     Lab Results   Component Value Date    HGBA1C 7.80 (H) 10/30/2023         Pertinent Medications Reviewed.  Patient manages diabetes with Trajenta.     Current Nutrition Orders & Evaluation of Intake       Oral Nutrition     Current PO Diet Diet: Cardiac Diets, Diabetic Diets, Renal Diets; Healthy Heart (2-3 Na+); Consistent Carbohydrate; Low Potassium, Low Phosphorus; Texture: Regular Texture (IDDSI 7); Fluid Consistency: Thin (IDDSI 0)  Patient consumed 25% of breakfast and 50% of lunch.  Patient receives renal diet related to stage III chronic kidney disease.   Supplement No active supplement orders       Malnutrition Severity Assessment                Nutrition Diagnosis         Nutrition Dx Problem 1 Nutrition appropriate for condition at  this time      Nutrition Intervention         Renal diet, regular texture, thin liquids     Medical Nutrition Therapy/Nutrition Education          Learner     Readiness N/A  N/A     Method     Response N/A  N/A     Monitor/Evaluation        Monitor Per protocol, PO intake, Weight, POC/GOC       Nutrition Discharge Plan         No nutrition discharge needs identified at this time       Electronically signed by:  Shanita Charlton RD  11/09/23 16:51 EST

## 2023-11-10 ENCOUNTER — APPOINTMENT (OUTPATIENT)
Dept: GENERAL RADIOLOGY | Facility: HOSPITAL | Age: 88
End: 2023-11-10
Payer: MEDICARE

## 2023-11-10 LAB
ANION GAP SERPL CALCULATED.3IONS-SCNC: 12.2 MMOL/L (ref 5–15)
BACTERIA SPEC AEROBE CULT: NORMAL
BUN SERPL-MCNC: 73 MG/DL (ref 8–23)
BUN/CREAT SERPL: 27.4 (ref 7–25)
CALCIUM SPEC-SCNC: 8.5 MG/DL (ref 8.6–10.5)
CHLORIDE SERPL-SCNC: 109 MMOL/L (ref 98–107)
CO2 SERPL-SCNC: 20.8 MMOL/L (ref 22–29)
CREAT SERPL-MCNC: 2.66 MG/DL (ref 0.57–1)
EGFRCR SERPLBLD CKD-EPI 2021: 17 ML/MIN/1.73
GLUCOSE SERPL-MCNC: 103 MG/DL (ref 65–99)
POTASSIUM SERPL-SCNC: 3.8 MMOL/L (ref 3.5–5.2)
SODIUM SERPL-SCNC: 142 MMOL/L (ref 136–145)

## 2023-11-10 PROCEDURE — 82088 ASSAY OF ALDOSTERONE: CPT | Performed by: INTERNAL MEDICINE

## 2023-11-10 PROCEDURE — 97165 OT EVAL LOW COMPLEX 30 MIN: CPT

## 2023-11-10 PROCEDURE — 84244 ASSAY OF RENIN: CPT | Performed by: INTERNAL MEDICINE

## 2023-11-10 PROCEDURE — 25010000002 ONDANSETRON PER 1 MG: Performed by: INTERNAL MEDICINE

## 2023-11-10 PROCEDURE — 25810000003 SODIUM CHLORIDE 0.9 % SOLUTION: Performed by: INTERNAL MEDICINE

## 2023-11-10 PROCEDURE — 73521 X-RAY EXAM HIPS BI 2 VIEWS: CPT

## 2023-11-10 PROCEDURE — 25010000002 CEFTRIAXONE PER 250 MG: Performed by: INTERNAL MEDICINE

## 2023-11-10 PROCEDURE — 96374 THER/PROPH/DIAG INJ IV PUSH: CPT

## 2023-11-10 PROCEDURE — 80048 BASIC METABOLIC PNL TOTAL CA: CPT | Performed by: INTERNAL MEDICINE

## 2023-11-10 PROCEDURE — 25010000002 ENOXAPARIN PER 10 MG: Performed by: INTERNAL MEDICINE

## 2023-11-10 PROCEDURE — 96372 THER/PROPH/DIAG INJ SC/IM: CPT

## 2023-11-10 PROCEDURE — 97161 PT EVAL LOW COMPLEX 20 MIN: CPT

## 2023-11-10 RX ADMIN — HYDRALAZINE HYDROCHLORIDE 25 MG: 25 TABLET, FILM COATED ORAL at 10:06

## 2023-11-10 RX ADMIN — Medication 10 ML: at 21:28

## 2023-11-10 RX ADMIN — Medication 10 ML: at 08:07

## 2023-11-10 RX ADMIN — CILOSTAZOL 100 MG: 100 TABLET ORAL at 21:28

## 2023-11-10 RX ADMIN — CARVEDILOL 25 MG: 12.5 TABLET, FILM COATED ORAL at 18:19

## 2023-11-10 RX ADMIN — CEFTRIAXONE SODIUM 1000 MG: 1 INJECTION, SOLUTION INTRAVENOUS at 21:28

## 2023-11-10 RX ADMIN — HYDROCODONE BITARTRATE AND ACETAMINOPHEN 1 TABLET: 5; 325 TABLET ORAL at 08:19

## 2023-11-10 RX ADMIN — ENOXAPARIN SODIUM 30 MG: 100 INJECTION SUBCUTANEOUS at 08:07

## 2023-11-10 RX ADMIN — LINAGLIPTIN 5 MG: 5 TABLET, FILM COATED ORAL at 08:07

## 2023-11-10 RX ADMIN — CILOSTAZOL 100 MG: 100 TABLET ORAL at 10:06

## 2023-11-10 RX ADMIN — CARVEDILOL 25 MG: 12.5 TABLET, FILM COATED ORAL at 08:07

## 2023-11-10 RX ADMIN — ASPIRIN 81 MG CHEWABLE TABLET 81 MG: 81 TABLET CHEWABLE at 08:07

## 2023-11-10 RX ADMIN — ONDANSETRON 4 MG: 2 INJECTION INTRAMUSCULAR; INTRAVENOUS at 22:37

## 2023-11-10 RX ADMIN — HYDRALAZINE HYDROCHLORIDE 25 MG: 25 TABLET, FILM COATED ORAL at 21:28

## 2023-11-10 RX ADMIN — HYDROCODONE BITARTRATE AND ACETAMINOPHEN 1 TABLET: 5; 325 TABLET ORAL at 21:29

## 2023-11-10 RX ADMIN — DOCUSATE SODIUM 50MG AND SENNOSIDES 8.6MG 2 TABLET: 8.6; 5 TABLET, FILM COATED ORAL at 21:28

## 2023-11-10 RX ADMIN — FAMOTIDINE 10 MG: 10 TABLET ORAL at 08:07

## 2023-11-10 RX ADMIN — SODIUM CHLORIDE 100 ML/HR: 9 INJECTION, SOLUTION INTRAVENOUS at 13:42

## 2023-11-10 NOTE — PLAN OF CARE
Goal Outcome Evaluation:  Plan of Care Reviewed With: patient           Outcome Evaluation: Patient A/Ox4.On room air. Up in chair. PRN Norco administered as ordered for pain. No other issues/needs at this time.

## 2023-11-10 NOTE — THERAPY EVALUATION
Acute Care - Physical Therapy Initial Evaluation  MAURICE Escamilla     Patient Name: Charlette Rai  : 1935  MRN: [id number]  Today's Date: 11/10/2023      Visit Dx:     ICD-10-CM ICD-9-CM   1. Acute renal failure superimposed on chronic kidney disease, unspecified acute renal failure type, unspecified CKD stage  N17.9 584.9    N18.9 585.9   2. Acute metabolic encephalopathy  G93.41 348.31   3. Difficulty walking  R26.2 719.7     Patient Active Problem List   Diagnosis    Malignant neoplasm of upper lobe of right lung    Allergic rhinitis    Rheumatoid arthritis    Asthma    Chronic obstructive pulmonary disease    Congestive heart failure    Essential hypertension    GERD (gastroesophageal reflux disease)    Hyperlipidemia LDL goal <70    Lumbar spinal stenosis    Migraine    Monoclonal paraproteinemia    Osteopenia    Oxygen dependent    Peripheral neuropathy    Stage 4 chronic kidney disease    Type 2 diabetes mellitus without complication    Vitamin D deficiency    Carotid artery stenosis    Chronic right-sided thoracic back pain    Acute pain of left shoulder    Peripheral vascular disease of lower extremity    Edema    Ex-smoker    Peripheral vascular disease    De Quervain's tenosynovitis    Arthritis of carpometacarpal (CMC) joint of right thumb    Arthritis of right hand    Steal syndrome of dialysis vascular access    Anemia of chronic renal failure, stage 4 (severe)    Anemia of chronic disease    Acute on chronic kidney failure    Acute UTI (urinary tract infection)     Past Medical History:   Diagnosis Date    Allergic rhinitis     Anemia     Arthritis     Asthma     USE INHALERS AND NEBULIZERS    Back pain     Bladder disorder     Cancer     LEFT LUNG CANCER  SURGERY AND CHEMO DONE  AND CURRENTLY   RIGHT LUNG CANCER HAS ONLY RECEIVED RADIATION THUS FAR    Chronic kidney disease     stage 3    CKD (chronic kidney disease) stage 4, GFR 15-29 ml/min     Condition not found     ulcer     Congestive heart failure (CHF)     FOLLOWED BY DR AQUINO. DENIES CP BUT DOES HAVE SOA CHRONIC ISSUE COPD/LUNG CANCER    COPD (chronic obstructive pulmonary disease)     FOLLOWED BY DR MARIAJOSE BAILEY    Coronary artery disease     DENIES CP BUT DOES GET SOA MOST OF THE TIME WITH EXERTION BUT OCC AT REST CHRONIC ISSUE COPD/LUNG CANCER    Deep vein thrombosis     Diabetes mellitus     DOES NOT CHECK BS DAILY    Disease of thyroid gland     HYPOTHYROIDISM    Essential hypertension     Gastric ulcer     GERD (gastroesophageal reflux disease)     Heart murmur     History of transfusion     NO ISSUES POST TRANSFUSION WAS MANY YEARS AGO    Hyperlipemia     Leukocytopenia     FOLLOWED BY DR MIR BAILEY    Limb swelling     Lumbago     Lumbar spinal stenosis     Lung cancer     Migraine headache     Multiple joint pain     On home oxygen therapy     3L/NC PRN    Osteopenia     Reflux esophagitis     Shortness of breath     Thyroid nodule     HAS ULTRASOUND YEARLY BEING MONITORED    Vascular disease     Vitamin D deficiency      Past Surgical History:   Procedure Laterality Date    ABDOMINAL SURGERY      APPENDECTOMY      ARTERIOVENOUS FISTULA/SHUNT SURGERY Left 05/10/2022    Procedure: Left basilic vein transposition;  Surgeon: Wan Barksdale MD;  Location: [facility];  Service: Vascular;  Laterality: Left;    ARTERIOVENOUS FISTULA/SHUNT SURGERY Left 3/23/2023    Procedure: Ligation of left arm arteriovenous fistula;  Surgeon: Wan Barksdale MD;  Location: [facility] OR;  Service: Vascular;  Laterality: Left;    CARDIAC CATHETERIZATION  1996    CARDIAC SURGERY      CARDIAC SURGERY      fluid drained from heart    CATARACT EXTRACTION, BILATERAL  2003    COLONOSCOPY  2014    ENDOSCOPY  2016 2019    FEMORAL ARTERY STENT Bilateral     HYSTERECTOMY      LUNG BIOPSY Left 2005    lobectomy upper lung caner    LUNG VOLUME REDUCTION      OTHER SURGICAL HISTORY      artifical joints/limbs    REPLACEMENT TOTAL KNEE Left 2016    UPPER  GASTROINTESTINAL ENDOSCOPY       PT Assessment (last 12 hours)       PT Evaluation and Treatment       Row Name 11/10/23 1300          Physical Therapy Time and Intention    Subjective Information complains of;weakness;fatigue  -AV     Document Type evaluation  -AV     Mode of Treatment individual therapy;physical therapy  -AV       Row Name 11/10/23 1300          General Information    Patient Profile Reviewed yes  -AV     Patient Observations alert;cooperative;agree to therapy  -AV     Prior Level of Function independent:;all household mobility;gait;transfer;ADL's  Ambulated with quad cane. 3 L O2 at night.  -AV     Equipment Currently Used at Home cane, quad  -AV     Existing Precautions/Restrictions fall  -AV       Row Name 11/10/23 1300          Living Environment    Current Living Arrangements home  -AV     Home Accessibility stairs to enter home;stairs within home  -AV     People in Home spouse  -AV       Row Name 11/10/23 1300          Home Main Entrance    Number of Stairs, Main Entrance three  -AV     Stair Railings, Main Entrance railings on both sides of stairs  -AV       Row Name 11/10/23 1300          Stairs Within Home, Primary    Stairs, Within Home, Primary Non-essential flights to basement and second level of home.  -AV       Row Name 11/10/23 1300          Cognition    Orientation Status (Cognition) oriented x 3  -AV       Row Name 11/10/23 1300          Range of Motion (ROM)    Range of Motion bilateral lower extremities;ROM is WFL  -AV       Row Name 11/10/23 1300          Strength (Manual Muscle Testing)    Strength (Manual Muscle Testing) bilateral lower extremities;strength is WFL  -AV       Row Name 11/10/23 1300          Bed Mobility    Comment, (Bed Mobility) Patient seated upright in recliner upon therapist entry.  -AV       Row Name 11/10/23 1300          Transfers    Transfers sit-stand transfer;stand-sit transfer  -AV       Row Name 11/10/23 1300          Sit-Stand Transfer     Sit-Stand [place] (Transfers) minimum assist (75% patient effort)  -AV     Assistive Device (Sit-Stand Transfers) walker, front-wheeled  -AV       Row Name 11/10/23 1300          Stand-Sit Transfer    Stand-Sit [place] (Transfers) minimum assist (75% patient effort)  -AV     Assistive Device (Stand-Sit Transfers) walker, front-wheeled  -AV       Row Name 11/10/23 1300          Gait/Stairs (Locomotion)    Comment, (Gait/Stairs) Patient completed marching in place and 2 steps forward/backward with RW and minimal assist. Patient shaky in standing and reports BLE heaviness. Ambulation away from recliner deferred for safety.  -AV       Row Name 11/10/23 1300          Safety Issues, Functional Mobility    Impairments Affecting Function (Mobility) balance;endurance/activity tolerance;strength;pain  -AV       Row Name 11/10/23 1300          Balance    Balance Assessment standing dynamic balance  -AV     Dynamic Standing Balance minimal assist  -AV     Position/Device Used, Standing Balance supported;walker, front-wheeled  -AV       Row Name             [REMOVED] Wound 03/23/23 1110 Left upper arm    Wound - Properties Group Placement Date: 03/23/23  -MH Placement Time: 1110  -MH Side: Left  -MH Orientation: upper  -MH Location: arm  -MH Removal Date: 11/10/23  -KT Removal Time: 0825  -KT    Retired Wound - Properties Group Placement Date: 03/23/23  -MH Placement Time: 1110  -MH Side: Left  -MH Orientation: upper  -MH Location: arm  -MH Removal Date: 11/10/23  -KT Removal Time: 0825  -KT    Retired Wound - Properties Group Date first assessed: 03/23/23  -MH Time first assessed: 1110  -MH Side: Left  -MH Location: arm  -MH Resolution Date: 11/10/23  -KT Resolution Time: 0825  -KT      Row Name 11/10/23 1300          Plan of Care Review    Plan of Care Reviewed With patient  -AV     Progress no change  -AV     Outcome Evaluation Patient presents with deficits in balance, endurance, transfers, and ambulation. Patient  will benefit from skilled PT services to address these mobility deficits and decrease risk of falls.  -AV       Row Name 11/10/23 1300          Positioning and Restraints    Pre-Treatment Position sitting in chair/recliner  -AV     Post Treatment Position chair  -AV     In Chair sitting;call light within reach;encouraged to call for assist;exit alarm on  -AV       Row Name 11/10/23 1300          Therapy Assessment/Plan (PT)    Rehab Potential (PT) good, to achieve stated therapy goals  -AV     Criteria for Skilled Interventions Met (PT) yes;meets criteria  -AV     Therapy Frequency (PT) daily  -AV     Predicted Duration of Therapy Intervention (PT) 10 days  -AV     Problem List (PT) problems related to;balance;mobility;strength;pain  -AV     Activity Limitations Related to Problem List (PT) unable to ambulate safely;unable to transfer safely  -AV       Row Name 11/10/23 1300          PT Evaluation Complexity    History, PT Evaluation Complexity no personal factors and/or comorbidities  -AV     Examination of Body Systems (PT Eval Complexity) total of 4 or more elements  -AV     Clinical Presentation (PT Evaluation Complexity) stable  -AV     Clinical Decision Making (PT Evaluation Complexity) low complexity  -AV     Overall Complexity (PT Evaluation Complexity) low complexity  -AV       Row Name 11/10/23 1300          Therapy Plan Review/Discharge Plan (PT)    Therapy Plan Review (PT) evaluation/treatment results reviewed;patient  -AV       Row Name 11/10/23 1300          Physical Therapy Goals    Bed Mobility Goal Selection (PT) bed mobility, PT goal 1  -AV     Transfer Goal Selection (PT) transfer, PT goal 1  -AV     Gait Training Goal Selection (PT) gait training, PT goal 1  -AV       Row Name 11/10/23 1300          Bed Mobility Goal 1 (PT)    Activity/Assistive Device (Bed Mobility Goal 1, PT) bed mobility activities, all  -AV     [place] Level/Cues Needed (Bed Mobility Goal 1, PT) independent  -AV     Time  Frame (Bed Mobility Goal 1, PT) 10 days  -AV       Row Name 11/10/23 1300          Transfer Goal 1 (PT)    Activity/Assistive Device (Transfer Goal 1, PT) transfers, all;walker, rolling  -AV     [place] Level/Cues Needed (Transfer Goal 1, PT) modified independence  -AV     Time Frame (Transfer Goal 1, PT) 10 days  -AV       Row Name 11/10/23 1300          Gait Training Goal 1 (PT)    Activity/Assistive Device (Gait Training Goal 1, PT) gait (walking locomotion);assistive device use;walker, rolling  -AV     [place] Level (Gait Training Goal 1, PT) modified independence  -AV     Distance (Gait Training Goal 1, PT) 200  -AV     Time Frame (Gait Training Goal 1, PT) 10 days  -AV               User Key  (r) = Recorded By, (t) = Taken By, (c) = Cosigned By      Initials Name Provider Type     Mary Richardson, RN Registered Nurse    Padma Lee RN Registered Nurse    Abdi Tee, PT Physical Therapist                    Physical Therapy Education       Title: PT OT SLP Therapies (In Progress)       Topic: Physical Therapy (In Progress)       Point: Mobility training (Done)       Learning Progress Summary             Patient Acceptance, E,TB, VU by  at 11/10/2023 1358                         Point: Home exercise program (Not Started)       Learner Progress:  Not documented in this visit.              Point: Body mechanics (Done)       Learning Progress Summary             Patient Acceptance, E,TB, VU by AV at 11/10/2023 1358                         Point: Precautions (Done)       Learning Progress Summary             Patient Acceptance, E,TB, VU by AV at 11/10/2023 1358                                         User Key       Initials Effective Dates Name Provider Type Discipline     06/11/21 -  Abdi Odom, RENAN Physical Therapist PT                  PT Recommendation and Plan  Anticipated Discharge Disposition (PT): skilled nursing facility  Planned Therapy Interventions (PT): balance  training, bed mobility training, gait training, home exercise program, neuromuscular re-education, strengthening, transfer training  Therapy Frequency (PT): daily  Plan of Care Reviewed With: patient  Progress: no change  Outcome Evaluation: Patient presents with deficits in balance, endurance, transfers, and ambulation. Patient will benefit from skilled PT services to address these mobility deficits and decrease risk of falls.   Outcome Measures       Row Name 11/10/23 1300             How much help from another person do you currently need...    Turning from your back to your side while in flat bed without using bedrails? 4  -AV      Moving from lying on back to sitting on the side of a flat bed without bedrails? 3  -AV      Moving to and from a bed to a chair (including a wheelchair)? 3  -AV      Standing up from a chair using your arms (e.g., wheelchair, bedside chair)? 3  -AV      Climbing 3-5 steps with a railing? 2  -AV      To walk in hospital room? 3  -AV      AM-PAC 6 Clicks Score (PT) 18  -AV      Highest level of mobility 6 --> Walked 10 steps or more  -AV         Functional Assessment    Outcome Measure Options AM-PAC 6 Clicks Basic Mobility (PT)  -AV                User Key  (r) = Recorded By, (t) = Taken By, (c) = Cosigned By      Initials Name Provider Type    Abdi Tee, PT Physical Therapist                     Time Calculation:    PT Charges       Row Name 11/10/23 9407             Time Calculation    PT Received On 11/10/23  -AV      PT Goal Re-Cert Due Date 11/19/23  -AV         Untimed Charges    PT Eval/Re-eval Minutes 40  -AV         Total Minutes    Untimed Charges Total Minutes 40  -AV       Total Minutes 40  -AV                User Key  (r) = Recorded By, (t) = Taken By, (c) = Cosigned By      Initials Name Provider Type    Abdi Tee, PT Physical Therapist                  Therapy Charges for Today       Code Description Service Date Service Provider Modifiers Qty     [id number]  PT EVAL LOW COMPLEXITY 3 11/10/2023 Adbi Odom, PT GP 1            PT G-Codes  Outcome Measure Options: AM-PAC 6 Clicks Basic Mobility (PT)  AM-PAC 6 Clicks Score (PT): 18  AM-PAC 6 Clicks Score (OT): 14    Abdi Odom, RENAN  11/10/2023

## 2023-11-10 NOTE — CONSULTS
[facility]   Nephrology Consult Note      Patient Name: Charlette Rai  : 1935  MRN: [id number]  Primary Care Physician:  Rafael Lyons MD  Referring Physician: No Known Provider  Date of admission: 2023    Subjective   Subjective     Reason for Consult/ Chief Complaint: Acute on chronic kidney disease    HPI:  Charlette Rai is a 88 y.o. female with history of CKD stage IV, renal artery stenosis bilaterally, congestive heart failure and hypertension who returned 3 days ago with confusion and acute kidney injury.  Creatinine went up to 3.8.  Baseline has been around 2.5.  She was discharged from the hospital few days ago after she was admitted with congestive heart failure.  She was sent home on Lasix 80 mg p.o. twice daily.  She stated that she was feeling all right.  Her daughter stated that she was throwing up.  She was diagnosed with UTI and is being treated for that.  She feels better but still very weak.  She is hoping to go to rehab.  Currently receiving normal saline at 100.  Creatinine improved to 2.6 today.  She has noticed increased lower extremity edema and upper extremity edema.    Review of Systems   All systems were reviewed and negative except for: What is mentioned above.    Personal History     Past Medical History:   Diagnosis Date    Allergic rhinitis     Anemia     Arthritis     Asthma     USE INHALERS AND NEBULIZERS    Back pain     Bladder disorder     Cancer     LEFT LUNG CANCER  SURGERY AND CHEMO DONE  AND CURRENTLY   RIGHT LUNG CANCER HAS ONLY RECEIVED RADIATION THUS FAR    Chronic kidney disease     stage 3    CKD (chronic kidney disease) stage 4, GFR 15-29 ml/min     Condition not found     ulcer    Congestive heart failure (CHF)     FOLLOWED BY DR AQUINO. DENIES CP BUT DOES HAVE SOA CHRONIC ISSUE COPD/LUNG CANCER    COPD (chronic obstructive pulmonary disease)     FOLLOWED BY DR MARIAJOSE BAILEY    Coronary artery disease     DENIES CP BUT DOES GET SOA MOST OF THE  TIME WITH EXERTION BUT OCC AT REST CHRONIC ISSUE COPD/LUNG CANCER    Deep vein thrombosis     Diabetes mellitus     DOES NOT CHECK BS DAILY    Disease of thyroid gland     HYPOTHYROIDISM    Essential hypertension     Gastric ulcer     GERD (gastroesophageal reflux disease)     Heart murmur     History of transfusion     NO ISSUES POST TRANSFUSION WAS MANY YEARS AGO    Hyperlipemia     Leukocytopenia     FOLLOWED BY DR MIR BAILEY    Limb swelling     Lumbago     Lumbar spinal stenosis     Lung cancer     Migraine headache     Multiple joint pain     On home oxygen therapy     3L/NC PRN    Osteopenia     Reflux esophagitis     Shortness of breath     Thyroid nodule     HAS ULTRASOUND YEARLY BEING MONITORED    Vascular disease     Vitamin D deficiency        Past Surgical History:   Procedure Laterality Date    ABDOMINAL SURGERY      APPENDECTOMY      ARTERIOVENOUS FISTULA/SHUNT SURGERY Left 05/10/2022    Procedure: Left basilic vein transposition;  Surgeon: Wan Barksdale MD;  Location: [facility] MAIN OR;  Service: Vascular;  Laterality: Left;    ARTERIOVENOUS FISTULA/SHUNT SURGERY Left 3/23/2023    Procedure: Ligation of left arm arteriovenous fistula;  Surgeon: Wan Barksdale MD;  Location: [facility] MAIN OR;  Service: Vascular;  Laterality: Left;    CARDIAC CATHETERIZATION  1996    CARDIAC SURGERY      CARDIAC SURGERY      fluid drained from heart    CATARACT EXTRACTION, BILATERAL  2003    COLONOSCOPY  2014    ENDOSCOPY  2016 2019    FEMORAL ARTERY STENT Bilateral     HYSTERECTOMY      LUNG BIOPSY Left 2005    lobectomy upper lung caner    LUNG VOLUME REDUCTION      OTHER SURGICAL HISTORY      artifical joints/limbs    REPLACEMENT TOTAL KNEE Left 2016    UPPER GASTROINTESTINAL ENDOSCOPY         Family History: family history includes Arthritis in her father and mother; Cancer in her brother, brother, brother, and brother; Diabetes in her brother; Other in her brother; Prostate cancer in her brother and brother.  Otherwise pertinent FHx was reviewed and not pertinent to current issue.    Social History:  reports that she quit smoking about 19 years ago. Her smoking use included cigarettes. She started smoking about 71 years ago. She has a 30.00 pack-year smoking history. She has never used smokeless tobacco. She reports that she does not drink alcohol and does not use drugs.    Home Medications:  albuterol sulfate HFA, amLODIPine, aspirin, budesonide-formoterol, carvedilol, cilostazol, dexlansoprazole, ergocalciferol, furosemide, hydrALAZINE, levocetirizine, linagliptin, nitroglycerin, polycarbophil, rosuvastatin, and sevelamer    Allergies:  No Known Allergies    Objective    Objective     Vitals:   Temp:  [97.2 °F (36.2 °C)-98.1 °F (36.7 °C)] 97.2 °F (36.2 °C)  Heart Rate:  [74-80] 74  Resp:  [18-20] 20  BP: (126-167)/(42-48) 126/45     Physical Exam    Constitutional: Awake, alert, no distress, sitting in a chair.  Mildly pale.   Eyes: sclerae anicteric, no conjunctival injection   HENT: mucous membranes moist   Neck: Supple, no thyromegaly, no lymphadenopathy, trachea midline, mild JVD, and near sitting position.  Full EJ bilaterally.   Respiratory: Clear to auscultation bilaterally, nonlabored respirations    Cardiovascular: RRR, no murmurs, rubs, or gallops.   Gastrointestinal: Positive bowel sounds, soft, nontender, nondistended   Musculoskeletal: 1+ edema worse on the left side, trace upper extremity edema.  No clubbing or cyanosis   Psychiatric: Appropriate affect, cooperative   Neurologic: Oriented x 3, moving all extremities, Cranial Nerves grossly intact, speech clear   Skin: warm and dry, no rashes     Result Review    Result Reviewed:  I have personally reviewed the results from the time of this admission to 11/10/2023 15:46 EST and agree with these findings:  [x]  Laboratory  []  Microbiology  [x]  Radiology  []  EKG/Telemetry   []  Cardiology/Vascular   []  Pathology  [x]  Old records  []  Other:  Lab Results    Component Value Date    GLUCOSE 103 (H) 11/10/2023    CALCIUM 8.5 (L) 11/10/2023     11/10/2023    K 3.8 11/10/2023    CO2 20.8 (L) 11/10/2023     (H) 11/10/2023    BUN 73 (H) 11/10/2023    CREATININE 2.66 (H) 11/10/2023    EGFRIFNONA 17 (L) 01/10/2022    BCR 27.4 (H) 11/10/2023    ANIONGAP 12.2 11/10/2023      Lab Results   Component Value Date    CALCIUM 8.5 (L) 11/10/2023    PHOS 4.0 11/04/2023              Most notable findings include: Creatinine 2.66, bicarb 20.  Hemoglobin 9.1.    Assessment & Plan   Assessment / Plan     Brief Patient Summary:  Charlette Rai is a 88 y.o. female who is admitted with acute kidney injury, confusion and UTI.    Active Hospital Problems:  Active Hospital Problems    Diagnosis     **Acute on chronic kidney failure     Acute UTI (urinary tract infection)     Anemia of chronic disease     Chronic obstructive pulmonary disease     Type 2 diabetes mellitus without complication        Assessment:   - Acute kidney injury, exact etiology is uncertain, no hypotension,  Creatinine improved with IV fluid.  Almost back to baseline.  - Chronic kidney disease stage IV with previously bland UA and mild proteinuria secondary to hypertensive and diabetic nephropathy in addition to possible ischemic nephropathy from bilateral renal artery stenosis.  Baseline creatinine has been around 2.5.  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  - Hypertension, severe at times.  Blood pressure medications on hold.  Bilateral renal artery stenosis.  We will check aldosterone/renin ratio.  Could consider spironolactone in addition.  - CHF, preserved EF with LVH and grade 1 diastolic dysfunction, volume status is generous now.  DC IV fluid.  Probably resume diuretic tomorrow.  - History of lung cancer.  - Diabetes with complications.  Per primary.  - Anemia, probably worse with volume expansion.  Recheck hemoglobin in AM.  - Generalized weakness  - UTI, treatment  per primary.    Plan:  - DC IV fluid  - Probable loop diuretics tomorrow  - Check aldosterone/renin ratio  - A.m. labs  Will follow.    Thank you very much for this consult!    Electronically signed by Edi García MD, 11/10/23, 3:46 PM EST.

## 2023-11-10 NOTE — THERAPY EVALUATION
Patient Name: Charlette Rai  : 1935    MRN: [id number]                              Today's Date: 11/10/2023       Admit Date: 2023    Visit Dx:     ICD-10-CM ICD-9-CM   1. Acute renal failure superimposed on chronic kidney disease, unspecified acute renal failure type, unspecified CKD stage  N17.9 584.9    N18.9 585.9   2. Acute metabolic encephalopathy  G93.41 348.31     Patient Active Problem List   Diagnosis    Malignant neoplasm of upper lobe of right lung    Allergic rhinitis    Rheumatoid arthritis    Asthma    Chronic obstructive pulmonary disease    Congestive heart failure    Essential hypertension    GERD (gastroesophageal reflux disease)    Hyperlipidemia LDL goal <70    Lumbar spinal stenosis    Migraine    Monoclonal paraproteinemia    Osteopenia    Oxygen dependent    Peripheral neuropathy    Stage 4 chronic kidney disease    Type 2 diabetes mellitus without complication    Vitamin D deficiency    Carotid artery stenosis    Chronic right-sided thoracic back pain    Acute pain of left shoulder    Peripheral vascular disease of lower extremity    Edema    Ex-smoker    Peripheral vascular disease    De Quervain's tenosynovitis    Arthritis of carpometacarpal (CMC) joint of right thumb    Arthritis of right hand    Steal syndrome of dialysis vascular access    Anemia of chronic renal failure, stage 4 (severe)    Anemia of chronic disease    Acute on chronic kidney failure    Acute UTI (urinary tract infection)     Past Medical History:   Diagnosis Date    Allergic rhinitis     Anemia     Arthritis     Asthma     USE INHALERS AND NEBULIZERS    Back pain     Bladder disorder     Cancer     LEFT LUNG CANCER  SURGERY AND CHEMO DONE  AND CURRENTLY   RIGHT LUNG CANCER HAS ONLY RECEIVED RADIATION THUS FAR    Chronic kidney disease     stage 3    CKD (chronic kidney disease) stage 4, GFR 15-29 ml/min     Condition not found     ulcer    Congestive heart failure (CHF)     FOLLOWED BY   [facility]QU[facility]. DENIES CP BUT DOES HAVE SOA CHRONIC ISSUE COPD/LUNG CANCER    COPD (chronic obstructive pulmonary disease)     FOLLOWED BY DR MARIAJOSE BAILEY    Coronary artery disease     DENIES CP BUT DOES GET SOA MOST OF THE TIME WITH EXERTION BUT OCC AT REST CHRONIC ISSUE COPD/LUNG CANCER    Deep vein thrombosis     Diabetes mellitus     DOES NOT CHECK BS DAILY    Disease of thyroid gland     HYPOTHYROIDISM    Essential hypertension     Gastric ulcer     GERD (gastroesophageal reflux disease)     Heart murmur     History of transfusion     NO ISSUES POST TRANSFUSION WAS MANY YEARS AGO    Hyperlipemia     Leukocytopenia     FOLLOWED BY DR MIR BAILEY    Limb swelling     Lumbago     Lumbar spinal stenosis     Lung cancer     Migraine headache     Multiple joint pain     On home oxygen therapy     3L/NC PRN    Osteopenia     Reflux esophagitis     Shortness of breath     Thyroid nodule     HAS ULTRASOUND YEARLY BEING MONITORED    Vascular disease     Vitamin D deficiency      Past Surgical History:   Procedure Laterality Date    ABDOMINAL SURGERY      APPENDECTOMY      ARTERIOVENOUS FISTULA/SHUNT SURGERY Left 05/10/2022    Procedure: Left basilic vein transposition;  Surgeon: Wan Barksdale MD;  Location: [facility];  Service: Vascular;  Laterality: Left;    ARTERIOVENOUS FISTULA/SHUNT SURGERY Left 3/23/2023    Procedure: Ligation of left arm arteriovenous fistula;  Surgeon: Wan Barksdale MD;  Location: [facility];  Service: Vascular;  Laterality: Left;    CARDIAC CATHETERIZATION  1996    CARDIAC SURGERY      CARDIAC SURGERY      fluid drained from heart    CATARACT EXTRACTION, BILATERAL  2003    COLONOSCOPY  2014    ENDOSCOPY  2016    2019    FEMORAL ARTERY STENT Bilateral     HYSTERECTOMY      LUNG BIOPSY Left 2005    lobectomy upper lung caner    LUNG VOLUME REDUCTION      OTHER SURGICAL HISTORY      artifical joints/limbs    REPLACEMENT TOTAL KNEE Left 2016    UPPER GASTROINTESTINAL ENDOSCOPY        General  Information       Row Name 11/10/23 1105          OT Time and Intention    Document Type evaluation  -PG     Mode of Treatment individual therapy;occupational therapy  -PG       Row Name 11/10/23 1105          General Information    Patient Profile Reviewed yes  Patient lives with spouse and normally independent with all self-care and functional transfers.  Declined over the last 2 to 3 weeks.  Recently discharged from the hospital a few days ago and readmitted for acute metabolic encephalopathy and GW  -PG     Prior Level of Function independent:;ADL's;transfer  -PG     Existing Precautions/Restrictions fall  -PG     Barriers to Rehab none identified  -PG       Row Name 11/10/23 1105          Occupational Profile    Reason for Services/Referral (Occupational Profile) Patient is a 88-year-old female recently discharged home and then readmitted for acute metabolic encephalopathy and weakness.  Patient was getting ready to have home health services come to her house.  Patient now being evaluated by Occupational Therapy due to recent decline in ADL function.  -PG       Row Name 11/10/23 1105          Living Environment    People in Home spouse  -PG       Row Name 11/10/23 1105          Cognition    Orientation Status (Cognition) oriented x 3  -PG       Row Name 11/10/23 1105          Safety Issues, Functional Mobility    Impairments Affecting Function (Mobility) balance;endurance/activity tolerance;strength;pain  -PG               User Key  (r) = Recorded By, (t) = Taken By, (c) = Cosigned By      Initials Name Provider Type    PG Laron Mosqueda OT Occupational Therapist                     Mobility/ADL's       Row Name 11/10/23 1109          Transfers    Transfers sit-stand transfer;stand-sit transfer  -PG       Row Name 11/10/23 1109          Sit-Stand Transfer    Sit-Stand [place] (Transfers) verbal cues;minimum assist (75% patient effort)  -PG     Assistive Device (Sit-Stand Transfers) walker, front-wheeled   -PG       Row Name 11/10/23 1109          Stand-Sit Transfer    Stand-Sit [place] (Transfers) minimum assist (75% patient effort)  -     Assistive Device (Stand-Sit Transfers) walker, front-wheeled  -       Row Name 11/10/23 1109          Activities of Daily Living    BADL Assessment/Intervention bathing;upper body dressing;lower body dressing;grooming;toileting  -       Row Name 11/10/23 1109          Bathing Assessment/Intervention    [place] Level (Bathing) bathing skills;moderate assist (50% patient effort)  -       Row Name 11/10/23 1109          Upper Body Dressing Assessment/Training    [place] Level (Upper Body Dressing) upper body dressing skills;minimum assist (75% patient effort)  -       Row Name 11/10/23 1109          Lower Body Dressing Assessment/Training    [place] Level (Lower Body Dressing) lower body dressing skills;maximum assist (25% patient effort)  -       Row Name 11/10/23 1109          Grooming Assessment/Training    [place] Level (Grooming) grooming skills;set up  -[facility] Name 11/10/23 1109          Toileting Assessment/Training    [place] Level (Toileting) toileting skills;dependent (less than 25% patient effort)  -               User Key  (r) = Recorded By, (t) = Taken By, (c) = Cosigned By      Initials Name Provider Type    PG Laron Mosqueda OT Occupational Therapist                   Obj/Interventions       Row Name 11/10/23 1110          Sensory Assessment (Somatosensory)    Sensory Assessment (Somatosensory) sensation intact  -[facility] Name 11/10/23 1110          Vision Assessment/Intervention    Visual Impairment/Limitations corrective lenses full-time  -       Row Name 11/10/23 1110          Range of Motion Comprehensive    General Range of Motion no range of motion deficits identified  -       Row Name 11/10/23 1110          Strength Comprehensive (MMT)    Comment, General Manual Muscle Testing (MMT) Assessment 4 - to 4/5  bilateral upper extremity  -PG       Row Name 11/10/23 1110          Motor Skills    Motor Skills coordination;functional endurance  -PG     Functional Endurance Poor  -PG               User Key  (r) = Recorded By, (t) = Taken By, (c) = Cosigned By      Initials Name Provider Type    PG Laron Mosqueda, OT Occupational Therapist                   Goals/Plan       Row Name 11/10/23 1113          Transfer Goal 1 (OT)    Activity/Assistive Device (Transfer Goal 1, OT) transfers, all  -PG     [place] Level/Cues Needed (Transfer Goal 1, OT) modified independence  -PG     Time Frame (Transfer Goal 1, OT) long term goal (LTG);10 days  -PG       Row Name 11/10/23 1113          Bathing Goal 1 (OT)    Activity/Device (Bathing Goal 1, OT) bathing skills, all  -PG     [place] Level/Cues Needed (Bathing Goal 1, OT) modified independence  -PG     Time Frame (Bathing Goal 1, OT) long term goal (LTG);10 days  -PG       Row Name 11/10/23 1113          Dressing Goal 1 (OT)    Activity/Device (Dressing Goal 1, OT) dressing skills, all  -PG     [place]/Cues Needed (Dressing Goal 1, OT) modified independence  -PG     Time Frame (Dressing Goal 1, OT) long term goal (LTG);10 days  -PG       Row Name 11/10/23 1113          Toileting Goal 1 (OT)    Activity/Device (Toileting Goal 1, OT) toileting skills, all  -PG     [place] Level/Cues Needed (Toileting Goal 1, OT) modified independence  -PG     Time Frame (Toileting Goal 1, OT) long term goal (LTG);10 days  -PG       Row Name 11/10/23 1113          Grooming Goal 1 (OT)    Activity/Device (Grooming Goal 1, OT) grooming skills, all  -PG     [place] (Grooming Goal 1, OT) modified independence  -PG     Time Frame (Grooming Goal 1, OT) long term goal (LTG);10 days  -PG       Row Name 11/10/23 1113          Strength Goal 1 (OT)    Strength Goal 1 (OT) Patient will improve bilateral upper extremity strength to 4+/5 to support safety and independence with self-care  activities  -PG     Time Frame (Strength Goal 1, OT) long term goal (LTG);10 days  -PG       Row Name 11/10/23 1113          Problem Specific Goal 1 (OT)    Problem Specific Goal 1 (OT) Patient will improve activity tolerance to fair plus to support independence and engagement with ADL activities  -PG     Time Frame (Problem Specific Goal 1, OT) long term goal (LTG);10 days  -PG       Row Name 11/10/23 1113          Therapy Assessment/Plan (OT)    Planned Therapy Interventions (OT) activity tolerance training;BADL retraining;transfer/mobility retraining;patient/caregiver education/training;occupation/activity based interventions;strengthening exercise  -PG               User Key  (r) = Recorded By, (t) = Taken By, (c) = Cosigned By      Initials Name Provider Type    PG Larno Mosqueda OT Occupational Therapist                   Clinical Impression       Row Name 11/10/23 1111          Pain Assessment    Posttreatment Pain Rating 8/10  -PG     Pain Location - Side/Orientation Right  -PG     Pain Location lower  -PG     Pain Location - extremity  -PG     Pain Intervention(s) Nursing Notified  -PG       Row Name 11/10/23 1111          Plan of Care Review    Plan of Care Reviewed With patient  -PG     Progress no change  -PG     Outcome Evaluation Patient presents with limitations affecting strength, activity tolerance, and balance impacting patient's ability to return home safely and independently.  The skills of a therapist will be required to safely and effectively implement the following treatment plan to restore maximal level of function  -PG       Row Name 11/10/23 1111          Therapy Assessment/Plan (OT)    Patient/Family Therapy Goal Statement (OT) Return home independently after going to rehab  -PG     Rehab Potential (OT) good, to achieve stated therapy goals  -PG     Criteria for Skilled Therapeutic Interventions Met (OT) yes;meets criteria;skilled treatment is necessary  -PG     Therapy Frequency (OT) 5  times/wk  -PG       Row Name 11/10/23 1111          Therapy Plan Review/Discharge Plan (OT)    Anticipated Discharge Disposition (OT) skilled nursing facility  -PG               User Key  (r) = Recorded By, (t) = Taken By, (c) = Cosigned By      Initials Name Provider Type    PG Laron Mosqueda, OT Occupational Therapist                   Outcome Measures       Row Name 11/10/23 1115          How much help from another is currently needed...    Putting on and taking off regular lower body clothing? 1  -PG     Bathing (including washing, rinsing, and drying) 2  -PG     Toileting (which includes using toilet bed pan or urinal) 1  -PG     Putting on and taking off regular upper body clothing 3  -PG     Taking care of personal grooming (such as brushing teeth) 3  -PG     Eating meals 4  -PG     AM-PAC 6 Clicks Score (OT) 14  -PG       Row Name 11/10/23 0819          How much help from another person do you currently need...    Turning from your back to your side while in flat bed without using bedrails? 4  -KT     Moving from lying on back to sitting on the side of a flat bed without bedrails? 3  -KT     Moving to and from a bed to a chair (including a wheelchair)? 3  -KT     Standing up from a chair using your arms (e.g., wheelchair, bedside chair)? 3  -KT     Climbing 3-5 steps with a railing? 2  -KT     To walk in hospital room? 3  -KT     AM-PAC 6 Clicks Score (PT) 18  -KT     Highest level of mobility 6 --> Walked 10 steps or more  -KT       Row Name 11/10/23 1115          Functional Assessment    Outcome Measure Options AM-PAC 6 Clicks Daily Activity (OT);Optimal Instrument  -PG       Row Name 11/10/23 1115          Optimal Instrument    Optimal Instrument Optimal - 3  -PG     Bending/Stooping 4  -PG     Standing 2  -PG     Reaching 2  -PG     From the list, choose the 3 activities you would most like to be able to do without any difficulty Bending/stooping;Standing;Reaching  -PG     Total Score Optimal - 3 8  -PG                User Key  (r) = Recorded By, (t) = Taken By, (c) = Cosigned By      Initials Name Provider Type    PG Laron Mosqueda OT Occupational Therapist    Padma Lee RN Registered Nurse                    Occupational Therapy Education       Title: PT OT SLP Therapies (In Progress)       Topic: Occupational Therapy (In Progress)       Point: ADL training (In Progress)       Description:   Instruct learner(s) on proper safety adaptation and remediation techniques during self care or transfers.   Instruct in proper use of assistive devices.                  Learning Progress Summary             Patient Acceptance, E,D, NR by PG at 11/10/2023 1117                         Point: Home exercise program (In Progress)       Description:   Instruct learner(s) on appropriate technique for monitoring, assisting and/or progressing therapeutic exercises/activities.                  Learning Progress Summary             Patient Acceptance, E,D, NR by PG at 11/10/2023 1117                         Point: Precautions (In Progress)       Description:   Instruct learner(s) on prescribed precautions during self-care and functional transfers.                  Learning Progress Summary             Patient Acceptance, E,D, NR by PG at 11/10/2023 1117                         Point: Body mechanics (In Progress)       Description:   Instruct learner(s) on proper positioning and spine alignment during self-care, functional mobility activities and/or exercises.                  Learning Progress Summary             Patient Acceptance, E,D, NR by PG at 11/10/2023 1117                                         User Key       Initials Effective Dates Name Provider Type Discipline     06/16/21 -  Laron Mosqueda OT Occupational Therapist OT                  OT Recommendation and Plan  Planned Therapy Interventions (OT): activity tolerance training, BADL retraining, transfer/mobility retraining, patient/caregiver education/training,  occupation/activity based interventions, strengthening exercise  Therapy Frequency (OT): 5 times/wk  Plan of Care Review  Plan of Care Reviewed With: patient  Progress: no change  Outcome Evaluation: Patient presents with limitations affecting strength, activity tolerance, and balance impacting patient's ability to return home safely and independently.  The skills of a therapist will be required to safely and effectively implement the following treatment plan to restore maximal level of function     Time Calculation:   Evaluation Complexity (OT)  Review Occupational Profile/Medical/Therapy History Complexity: brief/low complexity  Assessment, Occupational Performance/Identification of Deficit Complexity: 3-5 performance deficits  Clinical Decision Making Complexity (OT): problem focused assessment/low complexity  Overall Complexity of Evaluation (OT): low complexity     Time Calculation- OT       Row Name 11/10/23 1118             Time Calculation- OT    OT Received On 11/10/23  -PG      OT Goal Re-Cert Due Date 11/19/23  -PG         Untimed Charges    OT Eval/Re-eval Minutes 35  -PG         Total Minutes    Untimed Charges Total Minutes 35  -PG       Total Minutes 35  -PG                User Key  (r) = Recorded By, (t) = Taken By, (c) = Cosigned By      Initials Name Provider Type    PG Laron Mosqueda OT Occupational Therapist                  Therapy Charges for Today       Code Description Service Date Service Provider Modifiers Qty    84469106894 HC OT EVAL LOW COMPLEXITY 3 11/10/2023 Laron Mosqueda OT GO 1                 Laron Mosqueda OT  11/10/2023

## 2023-11-10 NOTE — PROGRESS NOTES
[facility]     Progress Note    Patient Name: Charlette Rai  : 1935  MRN: [id number]  Primary Care Physician:  Rafael Lyons MD  Date of admission: 2023      Subjective   Brief summary.  Patient admitted with worsening weakness and renal failure/UTI      HPI:  No shortness of breath, still extremely weak and tired, confused at times.  Complains of right hip pain.  No burning with urination    Review of Systems     Fatigue and weakness  Confusion  No shortness of breath  Complains of hip pain      Objective     Vitals:   Temp:  [97.2 °F (36.2 °C)-98.1 °F (36.7 °C)] 97.7 °F (36.5 °C)  Heart Rate:  [74-80] 79  Resp:  [18-20] 20  BP: (126-167)/(42-48) 150/46    Physical Exam :     Elderly female not in acute distress sitting in chair  Heart regular  Lungs clear, diminished breath sounds  No rhonchi or rales  Abdomen soft  Mild tenderness on the right hip  Range of movement painful  Extremities with trace of edema      Result Review:  I have personally reviewed the results from the time of this admission to 11/10/2023 18:15 EST and agree with these findings:  [x]  Laboratory  []  Microbiology  []  Radiology  []  EKG/Telemetry   []  Cardiology/Vascular   []  Pathology  []  Old records  []  Other:    Creatinine improving      Assessment / Plan       Active Hospital Problems:  Active Hospital Problems    Diagnosis     **Acute on chronic kidney failure     Acute UTI (urinary tract infection)     Anemia of chronic disease     Chronic obstructive pulmonary disease     Type 2 diabetes mellitus without complication        Plan:   Patient complains of hip pain, will proceed with x-ray, no report of fall.  Renal functions improving, nephrologist on board, appreciate Dr. García's help.  Continue antibiotics for now for UTI, cultures pending.  PT and OT.  Patient again refusing to go to inpatient rehab.   consulted.  Continue current treatment, possible discharge next week       DVT  prophylaxis:  Medical DVT prophylaxis orders are present.    CODE STATUS:   Code Status (Patient has no pulse and is not breathing): CPR (Attempt to Resuscitate)  Medical Interventions (Patient has pulse or is breathing): Full Support            Electronically signed by Rafael Lyons MD, 11/10/23, 6:15 PM EST.

## 2023-11-11 LAB
ANION GAP SERPL CALCULATED.3IONS-SCNC: 9.9 MMOL/L (ref 5–15)
BUN SERPL-MCNC: 67 MG/DL (ref 8–23)
BUN/CREAT SERPL: 28.2 (ref 7–25)
CALCIUM SPEC-SCNC: 8.4 MG/DL (ref 8.6–10.5)
CHLORIDE SERPL-SCNC: 111 MMOL/L (ref 98–107)
CO2 SERPL-SCNC: 21.1 MMOL/L (ref 22–29)
CREAT SERPL-MCNC: 2.38 MG/DL (ref 0.57–1)
DEPRECATED RDW RBC AUTO: 47.8 FL (ref 37–54)
EGFRCR SERPLBLD CKD-EPI 2021: 19.4 ML/MIN/1.73
ERYTHROCYTE [DISTWIDTH] IN BLOOD BY AUTOMATED COUNT: 13.2 % (ref 12.3–15.4)
GLUCOSE SERPL-MCNC: 102 MG/DL (ref 65–99)
HCT VFR BLD AUTO: 25.9 % (ref 34–46.6)
HGB BLD-MCNC: 7.7 G/DL (ref 12–15.9)
MAGNESIUM SERPL-MCNC: 1.6 MG/DL (ref 1.6–2.4)
MCH RBC QN AUTO: 29.7 PG (ref 26.6–33)
MCHC RBC AUTO-ENTMCNC: 29.7 G/DL (ref 31.5–35.7)
MCV RBC AUTO: 100 FL (ref 79–97)
PHOSPHATE SERPL-MCNC: 4.4 MG/DL (ref 2.5–4.5)
PLATELET # BLD AUTO: 197 10*3/MM3 (ref 140–450)
PMV BLD AUTO: 10.3 FL (ref 6–12)
POTASSIUM SERPL-SCNC: 4.2 MMOL/L (ref 3.5–5.2)
QT INTERVAL: 403 MS
QTC INTERVAL: 478 MS
RBC # BLD AUTO: 2.59 10*6/MM3 (ref 3.77–5.28)
SODIUM SERPL-SCNC: 142 MMOL/L (ref 136–145)
WBC NRBC COR # BLD: 6.73 10*3/MM3 (ref 3.4–10.8)

## 2023-11-11 PROCEDURE — 25810000003 SODIUM CHLORIDE 0.9 % SOLUTION 500 ML FLEX CONT: Performed by: INTERNAL MEDICINE

## 2023-11-11 PROCEDURE — 25010000002 CEFTRIAXONE PER 250 MG: Performed by: INTERNAL MEDICINE

## 2023-11-11 PROCEDURE — 80048 BASIC METABOLIC PNL TOTAL CA: CPT | Performed by: INTERNAL MEDICINE

## 2023-11-11 PROCEDURE — 83735 ASSAY OF MAGNESIUM: CPT | Performed by: INTERNAL MEDICINE

## 2023-11-11 PROCEDURE — 85027 COMPLETE CBC AUTOMATED: CPT | Performed by: INTERNAL MEDICINE

## 2023-11-11 PROCEDURE — 96372 THER/PROPH/DIAG INJ SC/IM: CPT

## 2023-11-11 PROCEDURE — 84100 ASSAY OF PHOSPHORUS: CPT | Performed by: INTERNAL MEDICINE

## 2023-11-11 PROCEDURE — 25010000002 ENOXAPARIN PER 10 MG: Performed by: INTERNAL MEDICINE

## 2023-11-11 RX ORDER — FUROSEMIDE 40 MG/1
80 TABLET ORAL DAILY
Status: DISCONTINUED | OUTPATIENT
Start: 2023-11-11 | End: 2023-11-14 | Stop reason: HOSPADM

## 2023-11-11 RX ADMIN — SODIUM CHLORIDE 40 ML: 9 INJECTION, SOLUTION INTRAVENOUS at 20:26

## 2023-11-11 RX ADMIN — CARVEDILOL 25 MG: 12.5 TABLET, FILM COATED ORAL at 08:43

## 2023-11-11 RX ADMIN — FUROSEMIDE 80 MG: 40 TABLET ORAL at 17:13

## 2023-11-11 RX ADMIN — LINAGLIPTIN 5 MG: 5 TABLET, FILM COATED ORAL at 08:42

## 2023-11-11 RX ADMIN — FAMOTIDINE 10 MG: 10 TABLET ORAL at 08:42

## 2023-11-11 RX ADMIN — DOCUSATE SODIUM 50MG AND SENNOSIDES 8.6MG 2 TABLET: 8.6; 5 TABLET, FILM COATED ORAL at 20:25

## 2023-11-11 RX ADMIN — CILOSTAZOL 100 MG: 100 TABLET ORAL at 20:25

## 2023-11-11 RX ADMIN — HYDRALAZINE HYDROCHLORIDE 25 MG: 25 TABLET, FILM COATED ORAL at 08:42

## 2023-11-11 RX ADMIN — Medication 10 ML: at 08:42

## 2023-11-11 RX ADMIN — CEFTRIAXONE SODIUM 1000 MG: 1 INJECTION, SOLUTION INTRAVENOUS at 20:26

## 2023-11-11 RX ADMIN — HYDROCODONE BITARTRATE AND ACETAMINOPHEN 1 TABLET: 5; 325 TABLET ORAL at 20:26

## 2023-11-11 RX ADMIN — ENOXAPARIN SODIUM 30 MG: 100 INJECTION SUBCUTANEOUS at 08:42

## 2023-11-11 RX ADMIN — ASPIRIN 81 MG CHEWABLE TABLET 81 MG: 81 TABLET CHEWABLE at 08:42

## 2023-11-11 RX ADMIN — HYDRALAZINE HYDROCHLORIDE 25 MG: 25 TABLET, FILM COATED ORAL at 20:25

## 2023-11-11 RX ADMIN — CARVEDILOL 25 MG: 12.5 TABLET, FILM COATED ORAL at 17:13

## 2023-11-11 RX ADMIN — CILOSTAZOL 100 MG: 100 TABLET ORAL at 08:42

## 2023-11-11 NOTE — PROGRESS NOTES
[facility]   Progress Note    Patient Name: Charlette Rai  : 1935  MRN: [id number]  Primary Care Physician: Rafael Lyons MD  Date of admission: 2023    Subjective   Subjective     Chief Complaint:   Follow-up on acute kidney injury    History of Present Illness  Patient is awake and alert.  No new complaints      Review of Systems   Constitutional:  Positive for activity change.       Objective   Objective     Vitals:  Temp:  [97.3 °F (36.3 °C)-98.2 °F (36.8 °C)] 97.3 °F (36.3 °C)  Heart Rate:  [75-90] 90  Resp:  [16-20] 16  BP: (136-161)/(43-56) 148/49    Physical Exam  Constitutional:       Appearance: Normal appearance.   Cardiovascular:      Rate and Rhythm: Normal rate.   Neurological:      General: No focal deficit present.      Mental Status: She is alert.         Result Review    Result Review:  I have personally reviewed the results from the time of this admission to 23 5:05 PM EST and agree with these findings:  []  Laboratory  []  Microbiology  []  Radiology  []  EKG/Telemetry   []  Cardiology/Vascular   []  Pathology  []  Old records  []  Other:    Assessment & Plan   Assessment / Plan       Active Hospital Problems:  Active Hospital Problems    Diagnosis     **Acute on chronic kidney failure     Acute UTI (urinary tract infection)     Anemia of chronic disease     Chronic obstructive pulmonary disease     Type 2 diabetes mellitus without complication        Plan:   Continue current management                    Electronically signed by Philippe Miller MD, 23, 5:05 PM EST.

## 2023-11-11 NOTE — PLAN OF CARE
Goal Outcome Evaluation:  Plan of Care Reviewed With: patient      Pt alert and oriented. Has been sitting up in a chair most of shift. Had right leg pain early this am, but states the pain medication she received helped relieve it. Call light in reach .

## 2023-11-11 NOTE — PROGRESS NOTES
LOS: 2 days   Patient Care Team:  Rafael Lyons MD as PCP - General (Internal Medicine)  Regis Mckeon MD as Consulting Physician (Radiation Oncology)  Carlton Casillas MD as Consulting Physician (Gastroenterology)  Susan Marcos APRN as Nurse Practitioner (Gastroenterology)    Chief Complaint:  Renal issues    Subjective     Interval History:     Patient Complaints: Chart reviewed.  No new issues overnight  She thinks confusion is better  Some swelling    aspirin, 81 mg, Oral, Daily  carvedilol, 25 mg, Oral, BID With Meals  cefTRIAXone, 1,000 mg, Intravenous, Q24H  cilostazol, 100 mg, Oral, BID  enoxaparin, 30 mg, Subcutaneous, Daily  famotidine, 10 mg, Oral, Daily  hydrALAZINE, 25 mg, Oral, BID  linagliptin, 5 mg, Oral, Daily  senna-docusate sodium, 2 tablet, Oral, Nightly  sodium chloride, 10 mL, Intravenous, Q12H      Pharmacy to Dose enoxaparin (LOVENOX),         Review of Systems:   General : No pain, no chills  HEENT: No headache, no nosebleed, no sore throat, no blurry vision  Chest : no chest pain, no palpitations  Respiratory: No cough, no SOA, no hemoptysis  GI:  No N/V/D, no abdominal pain  Skin : no rashes, no pruritus  Musculoskeletal : no flank pain  Neuro : + weakness, no dizziness, no focal deficits  Endocrine: no heat/cold intolerance  Genitourinary: no dysuria or hematuria    Objective     Vital Signs  Temp:  [97.3 °F (36.3 °C)-98.2 °F (36.8 °C)] 97.3 °F (36.3 °C)  Heart Rate:  [75-90] 90  Resp:  [16-20] 16  BP: (136-161)/(43-56) 148/49    Intake/Output Summary (Last 24 hours) at 11/11/2023 1602  Last data filed at 11/11/2023 1200  Gross per 24 hour   Intake 720 ml   Output --   Net 720 ml           Physical Exam:     General Appearance:    Alert,  in no acute distress   Head:    Normocephalic, without obvious abnormality, atraumatic   Throat:    no thrush, oral mucosa moist   Neck:   No adenopathy, supple,   Musc:     No CVA tenderness to palpation   Lungs:     Clear to  "auscultation,respirations unlabored, no wheezes or rhonchi    Heart:    Regular rate and rhythm , normal S1 and S2, no            murmur, no gallop, no rub   Abdomen:     Normal bowel sounds, no masses, soft non-tender   Extremities:    +1 lower extremity edema, no cyanosis   :     No hematuria    Skin:   Dry, No  rash                  Results Review:    Results from last 7 days   Lab Units 11/11/23  0605 11/10/23  0612 11/09/23  0905   SODIUM mmol/L 142 142 140   POTASSIUM mmol/L 4.2 3.8 4.2   CHLORIDE mmol/L 111* 109* 103   CO2 mmol/L 21.1* 20.8* 20.4*   BUN mg/dL 67* 73* 85*   CREATININE mg/dL 2.38* 2.66* 3.38*   GLUCOSE mg/dL 102* 103* 94   CALCIUM mg/dL 8.4* 8.5* 8.6     Results from last 7 days   Lab Units 11/11/23  0605 11/09/23  0421   WBC 10*3/mm3 6.73 6.93   HEMOGLOBIN g/dL 7.7* 9.1*   HEMATOCRIT % 25.9* 29.3*   PLATELETS 10*3/mm3 197 228     Magnesium   Date Value Ref Range Status   11/11/2023 1.6 1.6 - 2.4 mg/dL Final     Phosphorus   Date Value Ref Range Status   11/11/2023 4.4 2.5 - 4.5 mg/dL Final     No results found for: \"BNP\"  Lab Results   Component Value Date    ALBUMIN 3.9 11/09/2023      Brief Urine Lab Results  (Last result in the past 365 days)        Color   Clarity   Blood   Leuk Est   Nitrite   Protein   CREAT   Urine HCG        11/09/23 0431 Yellow   Clear   Negative   Negative   Negative   100 mg/dL (2+)                    XR Hips Bilateral With or Without Pelvis 2 View    Result Date: 11/10/2023  PROCEDURE: XR HIPS BILATERAL W OR WO PELVIS 2-VIEW  COMPARISON: 7/8/2016.  INDICATIONS: RIGHT HIP PAIN; H/O LEFT LUNG CA.  FINDINGS:  Five (5) views were obtained.  No acute fracture or acute malalignment is identified.  Moderate-to-severe bilateral (right greater than left) degenerative joint disease (osteoarthritis) involves the bilateral hip joints.  Arterial calcifications are seen.  Endovascular stents are in place in the bilateral iliac arterial system.  A surgical clip is projected " over the right groin.  Bowel gas obscures detail.  Generalized osteopenia is suspected.  There may be mild pubic osteitis.  Mild-to-moderate degenerative changes involve the bilateral sacroiliac (SI) joints.  If symptoms or clinical concerns persist, consider imaging follow-up.      Impression:   No acute fracture or acute malalignment is identified.  Moderate to severe osteoarthritis is suggested involving the right hip joint.  Please see above comments for further detail.     Please note that portions of this note were completed with a voice recognition program.  OLMAN BAIG JR, MD       Electronically Signed and Approved By: OLMAN BAIG JR, MD on 11/10/2023 at 23:07                    Assessment & Plan       Acute on chronic kidney failure    Chronic obstructive pulmonary disease    Type 2 diabetes mellitus without complication    Anemia of chronic disease    Acute UTI (urinary tract infection)     Acute kidney injury, exact etiology is uncertain, no hypotension,  Creatinine improved with IV fluid,back to baseline.  - Chronic kidney disease stage IV with previously bland UA and mild proteinuria secondary to hypertensive and diabetic nephropathy in addition to possible ischemic nephropathy from bilateral renal artery stenosis.  Baseline creatinine has been around 2.5.  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  - Hypertension, severe at times.- better  Bilateral renal artery stenosis.  We will check aldosterone/renin ratio.  Could consider spironolactone in addition.  - CHF, preserved EF with LVH and grade 1 diastolic dysfunction, volume status is generous now.  Resume lasix  - History of lung cancer.  - Diabetes with complications.  Per primary.  - Anemia, probably worse with volume expansion.  Recheck hemoglobin in AM.  - Generalized weakness  - UTI, treatment per primary.     Plan:  - Resume lasix

## 2023-11-12 LAB
ANION GAP SERPL CALCULATED.3IONS-SCNC: 10.5 MMOL/L (ref 5–15)
BUN SERPL-MCNC: 62 MG/DL (ref 8–23)
BUN/CREAT SERPL: 28.1 (ref 7–25)
CALCIUM SPEC-SCNC: 8.6 MG/DL (ref 8.6–10.5)
CHLORIDE SERPL-SCNC: 108 MMOL/L (ref 98–107)
CO2 SERPL-SCNC: 21.5 MMOL/L (ref 22–29)
CREAT SERPL-MCNC: 2.21 MG/DL (ref 0.57–1)
EGFRCR SERPLBLD CKD-EPI 2021: 21.2 ML/MIN/1.73
GLUCOSE SERPL-MCNC: 99 MG/DL (ref 65–99)
MAGNESIUM SERPL-MCNC: 1.6 MG/DL (ref 1.6–2.4)
POTASSIUM SERPL-SCNC: 4.3 MMOL/L (ref 3.5–5.2)
SODIUM SERPL-SCNC: 140 MMOL/L (ref 136–145)

## 2023-11-12 PROCEDURE — 96372 THER/PROPH/DIAG INJ SC/IM: CPT

## 2023-11-12 PROCEDURE — 80048 BASIC METABOLIC PNL TOTAL CA: CPT | Performed by: INTERNAL MEDICINE

## 2023-11-12 PROCEDURE — 25010000002 ENOXAPARIN PER 10 MG: Performed by: INTERNAL MEDICINE

## 2023-11-12 PROCEDURE — 83735 ASSAY OF MAGNESIUM: CPT | Performed by: INTERNAL MEDICINE

## 2023-11-12 RX ADMIN — HYDRALAZINE HYDROCHLORIDE 25 MG: 25 TABLET, FILM COATED ORAL at 09:08

## 2023-11-12 RX ADMIN — LINAGLIPTIN 5 MG: 5 TABLET, FILM COATED ORAL at 09:08

## 2023-11-12 RX ADMIN — CILOSTAZOL 100 MG: 100 TABLET ORAL at 09:08

## 2023-11-12 RX ADMIN — CARVEDILOL 25 MG: 12.5 TABLET, FILM COATED ORAL at 18:11

## 2023-11-12 RX ADMIN — Medication 10 ML: at 09:07

## 2023-11-12 RX ADMIN — ASPIRIN 81 MG CHEWABLE TABLET 81 MG: 81 TABLET CHEWABLE at 09:08

## 2023-11-12 RX ADMIN — FAMOTIDINE 10 MG: 10 TABLET ORAL at 09:08

## 2023-11-12 RX ADMIN — FUROSEMIDE 80 MG: 40 TABLET ORAL at 09:08

## 2023-11-12 RX ADMIN — HYDRALAZINE HYDROCHLORIDE 25 MG: 25 TABLET, FILM COATED ORAL at 20:19

## 2023-11-12 RX ADMIN — CILOSTAZOL 100 MG: 100 TABLET ORAL at 20:19

## 2023-11-12 RX ADMIN — CARVEDILOL 25 MG: 12.5 TABLET, FILM COATED ORAL at 09:08

## 2023-11-12 RX ADMIN — ENOXAPARIN SODIUM 30 MG: 100 INJECTION SUBCUTANEOUS at 09:07

## 2023-11-12 NOTE — PLAN OF CARE
Goal Outcome Evaluation:  Plan of Care Reviewed With: patient      Pt alert and oriented. Sat in chair most of shift. Pleasant disposition. No complaints of pain this shift. Call light in reach

## 2023-11-12 NOTE — PROGRESS NOTES
LOS: 3 days   Patient Care Team:  Rafael Lyons MD as PCP - General (Internal Medicine)  Regis Mckeon MD as Consulting Physician (Radiation Oncology)  Carlton Casillas MD as Consulting Physician (Gastroenterology)  Susan Marcos APRN as Nurse Practitioner (Gastroenterology)    Chief Complaint:  Renal issues    Subjective     Interval History:     Patient Complaints: Chart reviewed.  No new issues overnight  Confusion seems  better, she is watching football on TV  Some swelling, but better  No SOA    aspirin, 81 mg, Oral, Daily  carvedilol, 25 mg, Oral, BID With Meals  cilostazol, 100 mg, Oral, BID  enoxaparin, 30 mg, Subcutaneous, Daily  famotidine, 10 mg, Oral, Daily  furosemide, 80 mg, Oral, Daily  hydrALAZINE, 25 mg, Oral, BID  linagliptin, 5 mg, Oral, Daily  senna-docusate sodium, 2 tablet, Oral, Nightly  sodium chloride, 10 mL, Intravenous, Q12H      Pharmacy to Dose enoxaparin (LOVENOX),         Review of Systems:   General : No pain, no chills  HEENT: No headache, no nosebleed, no sore throat, no blurry vision  Chest : no chest pain, no palpitations  Respiratory: No cough, no SOA, no hemoptysis  GI:  No N/V/D, no abdominal pain  Skin : no rashes, no pruritus  Musculoskeletal : no flank pain  Neuro : + weakness, no dizziness, no focal deficits  Endocrine: no heat/cold intolerance  Genitourinary: no dysuria or hematuria    Objective     Vital Signs  Temp:  [97.3 °F (36.3 °C)-98.4 °F (36.9 °C)] 98.1 °F (36.7 °C)  Heart Rate:  [79-92] 84  Resp:  [16] 16  BP: (126-158)/(45-70) 126/45    Intake/Output Summary (Last 24 hours) at 11/12/2023 1408  Last data filed at 11/12/2023 1200  Gross per 24 hour   Intake 720 ml   Output --   Net 720 ml           Physical Exam:     General Appearance:    Alert,  in no acute distress   Head:    Normocephalic, without obvious abnormality, atraumatic   Throat:    no thrush, oral mucosa moist   Neck:   No adenopathy, supple,   Musc:     No CVA tenderness to  "palpation   Lungs:     Clear to auscultation,respirations unlabored, no wheezes or rhonchi    Heart:    Regular rate and rhythm , normal S1 and S2, no            murmur, no gallop, no rub   Abdomen:     Normal bowel sounds, no masses, soft non-tender   Extremities:    +1 lower extremity edema, no cyanosis   :     No hematuria    Skin:   Dry, No  rash                  Results Review:    Results from last 7 days   Lab Units 11/12/23  0522 11/11/23  0605 11/10/23  0612   SODIUM mmol/L 140 142 142   POTASSIUM mmol/L 4.3 4.2 3.8   CHLORIDE mmol/L 108* 111* 109*   CO2 mmol/L 21.5* 21.1* 20.8*   BUN mg/dL 62* 67* 73*   CREATININE mg/dL 2.21* 2.38* 2.66*   GLUCOSE mg/dL 99 102* 103*   CALCIUM mg/dL 8.6 8.4* 8.5*     Results from last 7 days   Lab Units 11/11/23  0605 11/09/23  0421   WBC 10*3/mm3 6.73 6.93   HEMOGLOBIN g/dL 7.7* 9.1*   HEMATOCRIT % 25.9* 29.3*   PLATELETS 10*3/mm3 197 228     Magnesium   Date Value Ref Range Status   11/12/2023 1.6 1.6 - 2.4 mg/dL Final   11/11/2023 1.6 1.6 - 2.4 mg/dL Final     Phosphorus   Date Value Ref Range Status   11/11/2023 4.4 2.5 - 4.5 mg/dL Final     No results found for: \"BNP\"  Lab Results   Component Value Date    ALBUMIN 3.9 11/09/2023      Brief Urine Lab Results  (Last result in the past 365 days)        Color   Clarity   Blood   Leuk Est   Nitrite   Protein   CREAT   Urine HCG        11/09/23 0431 Yellow   Clear   Negative   Negative   Negative   100 mg/dL (2+)                    XR Hips Bilateral With or Without Pelvis 2 View    Result Date: 11/10/2023  PROCEDURE: XR HIPS BILATERAL W OR WO PELVIS 2-VIEW  COMPARISON: 7/8/2016.  INDICATIONS: RIGHT HIP PAIN; H/O LEFT LUNG CA.  FINDINGS:  Five (5) views were obtained.  No acute fracture or acute malalignment is identified.  Moderate-to-severe bilateral (right greater than left) degenerative joint disease (osteoarthritis) involves the bilateral hip joints.  Arterial calcifications are seen.  Endovascular stents are in place " in the bilateral iliac arterial system.  A surgical clip is projected over the right groin.  Bowel gas obscures detail.  Generalized osteopenia is suspected.  There may be mild pubic osteitis.  Mild-to-moderate degenerative changes involve the bilateral sacroiliac (SI) joints.  If symptoms or clinical concerns persist, consider imaging follow-up.      Impression:   No acute fracture or acute malalignment is identified.  Moderate to severe osteoarthritis is suggested involving the right hip joint.  Please see above comments for further detail.     Please note that portions of this note were completed with a voice recognition program.  OLMAN BAIG JR, MD       Electronically Signed and Approved By: OLMAN BAIG JR, MD on 11/10/2023 at 23:07                    Assessment & Plan       Acute on chronic kidney failure    Chronic obstructive pulmonary disease    Type 2 diabetes mellitus without complication    Anemia of chronic disease    Acute UTI (urinary tract infection)     Acute kidney injury, exact etiology is uncertain, no hypotension,  Creatinine improved with IV fluid,back to baseline. Now back on home dose of lasix  - Chronic kidney disease stage IV with previously bland UA and mild proteinuria secondary to hypertensive and diabetic nephropathy in addition to possible ischemic nephropathy from bilateral renal artery stenosis.  Baseline creatinine has been around 2.5.  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  - Hypertension, severe at times.- better  Bilateral renal artery stenosis.  We will check aldosterone/renin ratio.  Could consider spironolactone in addition.  - CHF, preserved EF with LVH and grade 1 diastolic dysfunction, volume status is generous now.  Resumed lasix  - History of lung cancer.  - Diabetes with complications.  Per primary.  - Anemia, probably worse with volume expansion.  Recheck hemoglobin in AM.  - Generalized weakness  - UTI, treatment per  primary.     Plan:  - Am labs

## 2023-11-12 NOTE — PROGRESS NOTES
[facility]   Progress Note    Patient Name: Charlette Rai  : 1935  MRN: [id number]  Primary Care Physician: Rafael Lyons MD  Date of admission: 2023    Subjective   Subjective     Chief Complaint:   Follow-up on acute kidney injury    History of Present Illness  Patient is awake and alert.  No new complaints      Review of Systems   Constitutional:  Positive for activity change.       Objective   Objective     Vitals:  Temp:  [97.3 °F (36.3 °C)-98.2 °F (36.8 °C)] 97.3 °F (36.3 °C)  Heart Rate:  [75-90] 90  Resp:  [16-20] 16  BP: (136-161)/(43-56) 148/49    Physical Exam  Constitutional:       Appearance: Normal appearance.   Cardiovascular:      Rate and Rhythm: Normal rate.   Neurological:      General: No focal deficit present.      Mental Status: She is alert.         Result Review    Result Review:  I have personally reviewed the results from the time of this admission to 23 5:05 PM EST and agree with these findings:  []  Laboratory  []  Microbiology  []  Radiology  []  EKG/Telemetry   []  Cardiology/Vascular   []  Pathology  []  Old records  []  Other:    Assessment & Plan   Assessment / Plan       Active Hospital Problems:  Active Hospital Problems    Diagnosis     **Acute on chronic kidney failure     Acute UTI (urinary tract infection)     Anemia of chronic disease     Chronic obstructive pulmonary disease     Type 2 diabetes mellitus without complication        Plan:   Continue current management.  Increase activity.  Advised patient to strongly consider inpatient or subacute rehabilitation                    Electronically signed by Philippe Miller MD, 23, 5:05 PM EST.

## 2023-11-13 PROBLEM — N17.9 ACUTE RENAL FAILURE (ARF): Status: ACTIVE | Noted: 2023-11-13

## 2023-11-13 LAB
ANION GAP SERPL CALCULATED.3IONS-SCNC: 8.5 MMOL/L (ref 5–15)
BASOPHILS # BLD AUTO: 0.03 10*3/MM3 (ref 0–0.2)
BASOPHILS NFR BLD AUTO: 0.4 % (ref 0–1.5)
BUN SERPL-MCNC: 70 MG/DL (ref 8–23)
BUN/CREAT SERPL: 27 (ref 7–25)
CALCIUM SPEC-SCNC: 8.3 MG/DL (ref 8.6–10.5)
CHLORIDE SERPL-SCNC: 108 MMOL/L (ref 98–107)
CO2 SERPL-SCNC: 22.5 MMOL/L (ref 22–29)
CREAT SERPL-MCNC: 2.59 MG/DL (ref 0.57–1)
DEPRECATED RDW RBC AUTO: 47.3 FL (ref 37–54)
EGFRCR SERPLBLD CKD-EPI 2021: 17.5 ML/MIN/1.73
EOSINOPHIL # BLD AUTO: 0.14 10*3/MM3 (ref 0–0.4)
EOSINOPHIL NFR BLD AUTO: 2 % (ref 0.3–6.2)
ERYTHROCYTE [DISTWIDTH] IN BLOOD BY AUTOMATED COUNT: 13.3 % (ref 12.3–15.4)
GLUCOSE SERPL-MCNC: 102 MG/DL (ref 65–99)
HCT VFR BLD AUTO: 25.6 % (ref 34–46.6)
HGB BLD-MCNC: 8 G/DL (ref 12–15.9)
IMM GRANULOCYTES # BLD AUTO: 0.04 10*3/MM3 (ref 0–0.05)
IMM GRANULOCYTES NFR BLD AUTO: 0.6 % (ref 0–0.5)
LYMPHOCYTES # BLD AUTO: 0.92 10*3/MM3 (ref 0.7–3.1)
LYMPHOCYTES NFR BLD AUTO: 13.1 % (ref 19.6–45.3)
MCH RBC QN AUTO: 30.4 PG (ref 26.6–33)
MCHC RBC AUTO-ENTMCNC: 31.3 G/DL (ref 31.5–35.7)
MCV RBC AUTO: 97.3 FL (ref 79–97)
MONOCYTES # BLD AUTO: 0.94 10*3/MM3 (ref 0.1–0.9)
MONOCYTES NFR BLD AUTO: 13.4 % (ref 5–12)
NEUTROPHILS NFR BLD AUTO: 4.94 10*3/MM3 (ref 1.7–7)
NEUTROPHILS NFR BLD AUTO: 70.5 % (ref 42.7–76)
NRBC BLD AUTO-RTO: 0 /100 WBC (ref 0–0.2)
PLATELET # BLD AUTO: 191 10*3/MM3 (ref 140–450)
PMV BLD AUTO: 9.8 FL (ref 6–12)
POTASSIUM SERPL-SCNC: 4.4 MMOL/L (ref 3.5–5.2)
RBC # BLD AUTO: 2.63 10*6/MM3 (ref 3.77–5.28)
SODIUM SERPL-SCNC: 139 MMOL/L (ref 136–145)
WBC NRBC COR # BLD: 7.01 10*3/MM3 (ref 3.4–10.8)

## 2023-11-13 PROCEDURE — 80048 BASIC METABOLIC PNL TOTAL CA: CPT | Performed by: INTERNAL MEDICINE

## 2023-11-13 PROCEDURE — 96372 THER/PROPH/DIAG INJ SC/IM: CPT

## 2023-11-13 PROCEDURE — 85025 COMPLETE CBC W/AUTO DIFF WBC: CPT | Performed by: INTERNAL MEDICINE

## 2023-11-13 PROCEDURE — G0378 HOSPITAL OBSERVATION PER HR: HCPCS

## 2023-11-13 PROCEDURE — 25010000002 ENOXAPARIN PER 10 MG: Performed by: INTERNAL MEDICINE

## 2023-11-13 PROCEDURE — 97530 THERAPEUTIC ACTIVITIES: CPT

## 2023-11-13 PROCEDURE — 97110 THERAPEUTIC EXERCISES: CPT

## 2023-11-13 RX ADMIN — HYDROCODONE BITARTRATE AND ACETAMINOPHEN 1 TABLET: 5; 325 TABLET ORAL at 23:21

## 2023-11-13 RX ADMIN — CILOSTAZOL 100 MG: 100 TABLET ORAL at 09:28

## 2023-11-13 RX ADMIN — CARVEDILOL 25 MG: 12.5 TABLET, FILM COATED ORAL at 17:39

## 2023-11-13 RX ADMIN — HYDRALAZINE HYDROCHLORIDE 25 MG: 25 TABLET, FILM COATED ORAL at 09:28

## 2023-11-13 RX ADMIN — FUROSEMIDE 80 MG: 40 TABLET ORAL at 09:27

## 2023-11-13 RX ADMIN — LINAGLIPTIN 5 MG: 5 TABLET, FILM COATED ORAL at 09:28

## 2023-11-13 RX ADMIN — CILOSTAZOL 100 MG: 100 TABLET ORAL at 20:22

## 2023-11-13 RX ADMIN — FAMOTIDINE 10 MG: 10 TABLET ORAL at 09:28

## 2023-11-13 RX ADMIN — ASPIRIN 81 MG CHEWABLE TABLET 81 MG: 81 TABLET CHEWABLE at 09:28

## 2023-11-13 RX ADMIN — ENOXAPARIN SODIUM 30 MG: 100 INJECTION SUBCUTANEOUS at 09:27

## 2023-11-13 RX ADMIN — CARVEDILOL 25 MG: 12.5 TABLET, FILM COATED ORAL at 09:27

## 2023-11-13 NOTE — PROGRESS NOTES
LOS: 4 days   Patient Care Team:  Rafael Lyons MD as PCP - General (Internal Medicine)  Regis Mckeon MD as Consulting Physician (Radiation Oncology)  Carlton Casillas MD as Consulting Physician (Gastroenterology)  Susan Marcos APRN as Nurse Practitioner (Gastroenterology)    Chief Complaint:  Renal issues    Subjective     Interval History:     Patient Complaints: Chart reviewed.  No new issues overnight  Confusion seems  better, she is sitting in chair , no distress  Some swelling, but better  No SOA    aspirin, 81 mg, Oral, Daily  carvedilol, 25 mg, Oral, BID With Meals  cilostazol, 100 mg, Oral, BID  enoxaparin, 30 mg, Subcutaneous, Daily  famotidine, 10 mg, Oral, Daily  furosemide, 80 mg, Oral, Daily  hydrALAZINE, 25 mg, Oral, BID  linagliptin, 5 mg, Oral, Daily  senna-docusate sodium, 2 tablet, Oral, Nightly  sodium chloride, 10 mL, Intravenous, Q12H      Pharmacy to Dose enoxaparin (LOVENOX),         Review of Systems:   Negative other than was mentioned above.    Objective     Vital Signs  Temp:  [97.3 °F (36.3 °C)-98.1 °F (36.7 °C)] 97.3 °F (36.3 °C)  Heart Rate:  [] 87  Resp:  [16] 16  BP: (106-172)/(31-79) 132/45    Intake/Output Summary (Last 24 hours) at 11/13/2023 1715  Last data filed at 11/13/2023 0933  Gross per 24 hour   Intake 360 ml   Output --   Net 360 ml           Physical Exam:     General Appearance:    Alert,  in no acute distress   Head:    Normocephalic, without obvious abnormality, atraumatic   Throat:    no thrush, oral mucosa moist   Neck:   No adenopathy, supple,   Musc:     No CVA tenderness to palpation   Lungs:     Clear to auscultation,respirations unlabored, no wheezes or rhonchi    Heart:    Regular rate and rhythm , normal S1 and S2, no            murmur, no gallop, no rub   Abdomen:     Normal bowel sounds, no masses, soft non-tender   Extremities:    +1 lower extremity edema, no cyanosis   :     No hematuria    Skin:   Dry, No  rash            "       Results Review:    Results from last 7 days   Lab Units 11/13/23  0542 11/12/23  0522 11/11/23  0605   SODIUM mmol/L 139 140 142   POTASSIUM mmol/L 4.4 4.3 4.2   CHLORIDE mmol/L 108* 108* 111*   CO2 mmol/L 22.5 21.5* 21.1*   BUN mg/dL 70* 62* 67*   CREATININE mg/dL 2.59* 2.21* 2.38*   GLUCOSE mg/dL 102* 99 102*   CALCIUM mg/dL 8.3* 8.6 8.4*     Results from last 7 days   Lab Units 11/13/23  0542 11/11/23  0605 11/09/23  0421   WBC 10*3/mm3 7.01 6.73 6.93   HEMOGLOBIN g/dL 8.0* 7.7* 9.1*   HEMATOCRIT % 25.6* 25.9* 29.3*   PLATELETS 10*3/mm3 191 197 228     Magnesium   Date Value Ref Range Status   11/12/2023 1.6 1.6 - 2.4 mg/dL Final   11/11/2023 1.6 1.6 - 2.4 mg/dL Final     Phosphorus   Date Value Ref Range Status   11/11/2023 4.4 2.5 - 4.5 mg/dL Final     No results found for: \"BNP\"  Lab Results   Component Value Date    ALBUMIN 3.9 11/09/2023      Brief Urine Lab Results  (Last result in the past 365 days)        Color   Clarity   Blood   Leuk Est   Nitrite   Protein   CREAT   Urine HCG        11/09/23 0431 Yellow   Clear   Negative   Negative   Negative   100 mg/dL (2+)                    No radiology results from the last 24 hrs         Assessment & Plan       Acute on chronic kidney failure    Chronic obstructive pulmonary disease    Type 2 diabetes mellitus without complication    Anemia of chronic disease    Acute UTI (urinary tract infection)    - Acute kidney injury, exact etiology is uncertain, no hypotension,  Creatinine improved with IV fluid,back to baseline. Now back on home dose of lasix  - Chronic kidney disease stage IV with previously bland UA and mild proteinuria secondary to hypertensive and diabetic nephropathy in addition to possible ischemic nephropathy from bilateral renal artery stenosis.  Baseline creatinine has been around 2.5.  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  - Hypertension, severe at times.- better  Bilateral renal artery stenosis.  We " will check aldosterone/renin ratio.  Could consider spironolactone in addition.  - CHF, preserved EF with LVH and grade 1 diastolic dysfunction, volume status is generous now.  Resumed lasix  - History of lung cancer.  - Diabetes with complications.  Per primary.  - Anemia, probably worse with volume expansion.  Recheck hemoglobin in AM.  - Generalized weakness  - UTI, treatment per primary.     Plan:  - Continue Lasix 80 mg p.o. daily.    - Am labs  Discussed with family.  We will follow.

## 2023-11-13 NOTE — PROGRESS NOTES
[facility]     Progress Note    Patient Name: Charlette Rai  : 1935  MRN: [id number]  Primary Care Physician:  Rafael Lyons MD  Date of admission: 2023      Subjective   Brief summary.  Patient admitted with worsening weakness and renal failure/UTI      HPI:  Feeling better, more awake and alert.  No chest pain or shortness of breath    Review of Systems     Fatigue and weakness  Confusion improved  No shortness of breath        Objective     Vitals:   Temp:  [97.3 °F (36.3 °C)-98.1 °F (36.7 °C)] 97.3 °F (36.3 °C)  Heart Rate:  [] 87  Resp:  [16] 16  BP: (106-172)/(31-79) 132/45    Physical Exam :     Elderly female not in acute distress sitting in chair  Heart regular  Lungs clear, diminished breath sounds  No rhonchi or rales  Abdomen soft, nontender  Extremities with trace of edema      Result Review:  I have personally reviewed the results from the time of this admission to 2023 18:54 EST and agree with these findings:  [x]  Laboratory  []  Microbiology  []  Radiology  []  EKG/Telemetry   []  Cardiology/Vascular   []  Pathology  []  Old records  []  Other:    Creatinine improving      Assessment / Plan       Active Hospital Problems:  Active Hospital Problems    Diagnosis     **Acute on chronic kidney failure     Acute renal failure (ARF)     Acute UTI (urinary tract infection)     Anemia of chronic disease     Chronic obstructive pulmonary disease     Type 2 diabetes mellitus without complication        Plan:   Remains stable.  Continue to improve.  Nephrology on board managing diuretics.  Discussed with patient about inpatient rehab and nursing home but patient declined.  Discussed with patient's daughter, we will discharge her home tomorrow.       DVT prophylaxis:  Medical DVT prophylaxis orders are present.    CODE STATUS:   Code Status (Patient has no pulse and is not breathing): CPR (Attempt to Resuscitate)  Medical Interventions (Patient has pulse or is breathing): Full  Support              Electronically signed by Rafael Lyons MD, 11/13/23, 6:57 PM EST.

## 2023-11-13 NOTE — PLAN OF CARE
Goal Outcome Evaluation:  Plan of Care Reviewed With: patient        Progress: no change  Outcome Evaluation: Very pleasant patient rested up in chair throughout the day. No complaints of pain or discomfort. Up to bathroom a few times. Patient ambulates well, states SOA on exertion. No concerns at this time. Remains with no IV access

## 2023-11-13 NOTE — THERAPY TREATMENT NOTE
Acute Care - Physical Therapy Treatment Note  [facility]     Patient Name: Charlette Rai  : 1935  MRN: [id number]  Today's Date: 2023      Visit Dx:     ICD-10-CM ICD-9-CM   1. Acute renal failure superimposed on chronic kidney disease, unspecified acute renal failure type, unspecified CKD stage  N17.9 584.9    N18.9 585.9   2. Acute metabolic encephalopathy  G93.41 348.31   3. Difficulty walking  R26.2 719.7     Patient Active Problem List   Diagnosis    Malignant neoplasm of upper lobe of right lung    Allergic rhinitis    Rheumatoid arthritis    Asthma    Chronic obstructive pulmonary disease    Congestive heart failure    Essential hypertension    GERD (gastroesophageal reflux disease)    Hyperlipidemia LDL goal <70    Lumbar spinal stenosis    Migraine    Monoclonal paraproteinemia    Osteopenia    Oxygen dependent    Peripheral neuropathy    Stage 4 chronic kidney disease    Type 2 diabetes mellitus without complication    Vitamin D deficiency    Carotid artery stenosis    Chronic right-sided thoracic back pain    Acute pain of left shoulder    Peripheral vascular disease of lower extremity    Edema    Ex-smoker    Peripheral vascular disease    De Quervain's tenosynovitis    Arthritis of carpometacarpal (CMC) joint of right thumb    Arthritis of right hand    Steal syndrome of dialysis vascular access    Anemia of chronic renal failure, stage 4 (severe)    Anemia of chronic disease    Acute on chronic kidney failure    Acute UTI (urinary tract infection)     Past Medical History:   Diagnosis Date    Allergic rhinitis     Anemia     Arthritis     Asthma     USE INHALERS AND NEBULIZERS    Back pain     Bladder disorder     Cancer     LEFT LUNG CANCER  SURGERY AND CHEMO DONE  AND CURRENTLY   RIGHT LUNG CANCER HAS ONLY RECEIVED RADIATION THUS FAR    Chronic kidney disease     stage 3    CKD (chronic kidney disease) stage 4, GFR 15-29 ml/min     Condition not found     ulcer    Congestive  heart failure (CHF)     FOLLOWED BY DR AQUINO. DENIES CP BUT DOES HAVE SOA CHRONIC ISSUE COPD/LUNG CANCER    COPD (chronic obstructive pulmonary disease)     FOLLOWED BY DR MARIAJOSE BAILEY    Coronary artery disease     DENIES CP BUT DOES GET SOA MOST OF THE TIME WITH EXERTION BUT OCC AT REST CHRONIC ISSUE COPD/LUNG CANCER    Deep vein thrombosis     Diabetes mellitus     DOES NOT CHECK BS DAILY    Disease of thyroid gland     HYPOTHYROIDISM    Essential hypertension     Gastric ulcer     GERD (gastroesophageal reflux disease)     Heart murmur     History of transfusion     NO ISSUES POST TRANSFUSION WAS MANY YEARS AGO    Hyperlipemia     Leukocytopenia     FOLLOWED BY DR MIR BAILEY    Limb swelling     Lumbago     Lumbar spinal stenosis     Lung cancer     Migraine headache     Multiple joint pain     On home oxygen therapy     3L/NC PRN    Osteopenia     Reflux esophagitis     Shortness of breath     Thyroid nodule     HAS ULTRASOUND YEARLY BEING MONITORED    Vascular disease     Vitamin D deficiency      Past Surgical History:   Procedure Laterality Date    ABDOMINAL SURGERY      APPENDECTOMY      ARTERIOVENOUS FISTULA/SHUNT SURGERY Left 05/10/2022    Procedure: Left basilic vein transposition;  Surgeon: Wan Barksdale MD;  Location: [facility];  Service: Vascular;  Laterality: Left;    ARTERIOVENOUS FISTULA/SHUNT SURGERY Left 3/23/2023    Procedure: Ligation of left arm arteriovenous fistula;  Surgeon: Wan Barksdale MD;  Location: [facility] OR;  Service: Vascular;  Laterality: Left;    CARDIAC CATHETERIZATION  1996    CARDIAC SURGERY      CARDIAC SURGERY      fluid drained from heart    CATARACT EXTRACTION, BILATERAL  2003    COLONOSCOPY  2014    ENDOSCOPY  2016 2019    FEMORAL ARTERY STENT Bilateral     HYSTERECTOMY      LUNG BIOPSY Left 2005    lobectomy upper lung caner    LUNG VOLUME REDUCTION      OTHER SURGICAL HISTORY      artifical joints/limbs    REPLACEMENT TOTAL KNEE Left 2016    UPPER  GASTROINTESTINAL ENDOSCOPY       PT Assessment (last 12 hours)       PT Evaluation and Treatment       Row Name 11/13/23 0915          Physical Therapy Time and Intention    Subjective Information complains of;weakness;fatigue;pain  -DK     Document Type therapy note (daily note)  -DK     Mode of Treatment individual therapy;physical therapy  -DK     Patient Effort good  -DK     Symptoms Noted During/After Treatment fatigue;increased pain  -DK     Comment Pt reports a painful bruise on the right anterior thigh.  She was able to participate in exercises, transfers and gait for a longer distance.  -       Row Name 11/13/23 0915          Pain    Pretreatment Pain Rating 5/10  -DK     Posttreatment Pain Rating 5/10  -DK     Pain Location - Side/Orientation Right  -DK     Pain Location anterior  -DK     Pain Location - --  thigh  -DK     Pain Intervention(s) Repositioned;Ambulation/increased activity;Distraction;Therapeutic presence  -       Row Name 11/13/23 0915          Cognition    Affect/Mental Status (Cognition) WFL  -DK     Orientation Status (Cognition) oriented x 4  -DK     Follows Commands (Cognition) WFL  -DK     Cognitive Function WFL  -DK     Personal Safety Interventions gait belt;nonskid shoes/slippers when out of bed;supervised activity  -       Row Name 11/13/23 0915          Bed Mobility    Bed Mobility supine-sit  -DK     Supine-Sit [place] (Bed Mobility) standby assist  -     Assistive Device (Bed Mobility) bed rails  -       Row Name 11/13/23 0915          Transfers    Transfers sit-stand transfer;stand-sit transfer  -       Row Name 11/13/23 0915          Sit-Stand Transfer    Sit-Stand [place] (Transfers) standby assist;contact guard;1 person assist  -     Assistive Device (Sit-Stand Transfers) walker, front-wheeled  -       Row Name 11/13/23 0915          Stand-Sit Transfer    Stand-Sit [place] (Transfers) standby assist;contact guard;1 person assist  -DK      Assistive Device (Stand-Sit Transfers) walker, front-wheeled  -DK       Row Name 11/13/23 0915          Gait/Stairs (Locomotion)    Gait/Stairs Locomotion gait/ambulation independence;gait/ambulation assistive device;distance ambulated;gait pattern  -DK     [place] Level (Gait) standby assist;contact guard;1 person assist  -DK     Assistive Device (Gait) walker, front-wheeled  -DK     Distance in Feet (Gait) 25  -DK     Pattern (Gait) step-to  -DK     Deviations/Abnormal Patterns (Gait) festinating/shuffling;vinnie decreased;gait speed decreased;stride length decreased  -DK     Bilateral Gait Deviations forward flexed posture  -DK     Comment, (Gait/Stairs) Pt ambulated on room air with a rolling walker.  -       Row Name 11/13/23 0915          Safety Issues, Functional Mobility    Impairments Affecting Function (Mobility) endurance/activity tolerance;pain;range of motion (ROM);strength  -       Row Name 11/13/23 0915          Balance    Balance Assessment sitting static balance;sitting dynamic balance;standing static balance;standing dynamic balance  -DK     Static Sitting Balance standby assist  -DK     Dynamic Sitting Balance standby assist  -DK     Position, Sitting Balance unsupported;sitting edge of bed;sitting in chair  -DK     Static Standing Balance standby assist;contact guard;1-person assist  -DK     Dynamic Standing Balance standby assist;contact guard;1-person assist  -DK     Position/Device Used, Standing Balance walker, front-wheeled  -DK     Balance Interventions standing;dynamic;tandem gait  -       Row Name 11/13/23 0915          Motor Skills    Motor Skills --  therapeutic exercises  -     Therapeutic Exercise hip;knee;ankle  -       Row Name 11/13/23 0915          Hip (Therapeutic Exercise)    Hip (Therapeutic Exercise) AAROM (active assistive range of motion)  -     Hip AAROM (Therapeutic Exercise) bilateral;flexion;extension;aBduction;aDduction;supine;10 repetitions;2 sets   -       Row Name 11/13/23 0915          Knee (Therapeutic Exercise)    Knee (Therapeutic Exercise) AAROM (active assistive range of motion)  -DK     Knee AAROM (Therapeutic Exercise) bilateral;flexion;extension;supine;10 repetitions;2 sets  -       Row Name 11/13/23 0915          Ankle (Therapeutic Exercise)    Ankle (Therapeutic Exercise) AAROM (active assistive range of motion)  -DK     Ankle AAROM (Therapeutic Exercise) bilateral;dorsiflexion;plantarflexion;supine;10 repetitions;2 sets  -       Row Name 11/13/23 0915          Plan of Care Review    Plan of Care Reviewed With patient  -DK     Progress improving  -       Row Name 11/13/23 0915          Positioning and Restraints    Pre-Treatment Position in bed  -DK     Post Treatment Position chair  -DK     In Chair reclined;call light within reach;encouraged to call for assist;legs elevated  -       Row Name 11/13/23 0915          Therapy Assessment/Plan (PT)    Rehab Potential (PT) good, to achieve stated therapy goals  -     Criteria for Skilled Interventions Met (PT) skilled treatment is necessary  -     Therapy Frequency (PT) daily  -     Problem List (PT) problems related to;balance;mobility;strength;range of motion (ROM);pain;hearing  -     Activity Limitations Related to Problem List (PT) unable to ambulate safely;unable to transfer safely  -       Row Name 11/13/23 0915          Progress Summary (PT)    Progress Toward Functional Goals (PT) progress toward functional goals is good  -               User Key  (r) = Recorded By, (t) = Taken By, (c) = Cosigned By      Initials Name Provider Type    Sonia Wright PTA Physical Therapist Assistant                    Physical Therapy Education       Title: PT OT SLP Therapies (In Progress)       Topic: Physical Therapy (In Progress)       Point: Mobility training (Done)       Learning Progress Summary             Patient Acceptance, E,TB, VU by LEANDER at 11/10/2023 0068                          Point: Home exercise program (Not Started)       Learner Progress:  Not documented in this visit.              Point: Body mechanics (Done)       Learning Progress Summary             Patient Acceptance, E,TB, VU by AV at 11/10/2023 1358                         Point: Precautions (Done)       Learning Progress Summary             Patient Acceptance, E,TB, VU by AV at 11/10/2023 1358                                         User Key       Initials Effective Dates Name Provider Type Discipline    AV 06/11/21 -  Abdi Odom, PT Physical Therapist PT                  PT Recommendation and Plan  Planned Therapy Interventions (PT): balance training, bed mobility training, gait training, strengthening, transfer training  Therapy Frequency (PT): daily  Progress Summary (PT)  Progress Toward Functional Goals (PT): progress toward functional goals is good  Plan of Care Reviewed With: patient  Progress: improving   Outcome Measures       Row Name 11/13/23 0915 11/10/23 1300          How much help from another person do you currently need...    Turning from your back to your side while in flat bed without using bedrails? 4  -DK 4  -AV     Moving from lying on back to sitting on the side of a flat bed without bedrails? 4  -DK 3  -AV     Moving to and from a bed to a chair (including a wheelchair)? 3  -DK 3  -AV     Standing up from a chair using your arms (e.g., wheelchair, bedside chair)? 4  -DK 3  -AV     Climbing 3-5 steps with a railing? 3  -DK 2  -AV     To walk in hospital room? 3  -DK 3  -AV     AM-PAC 6 Clicks Score (PT) 21  -DK 18  -AV     Highest level of mobility 6 --> Walked 10 steps or more  -DK 6 --> Walked 10 steps or more  -AV        Functional Assessment    Outcome Measure Options AM-PAC 6 Clicks Basic Mobility (PT)  -DK AM-PAC 6 Clicks Basic Mobility (PT)  -AV               User Key  (r) = Recorded By, (t) = Taken By, (c) = Cosigned By      Initials Name Provider Type    Sonia Wright, PTA  Physical Therapist Assistant    Abdi Tee, PT Physical Therapist                     Time Calculation:    PT Charges       Row Name 11/13/23 0921             Time Calculation    PT Received On 11/13/23  -DK      PT Goal Re-Cert Due Date 11/19/23  -DK         Timed Charges    58158 - PT Therapeutic Exercise Minutes 14  -DK      57700 - Gait Training Minutes  5  -DK      34563 - PT Therapeutic Activity Minutes 6  -DK         Total Minutes    Timed Charges Total Minutes 25  -DK       Total Minutes 25  -DK                User Key  (r) = Recorded By, (t) = Taken By, (c) = Cosigned By      Initials Name Provider Type    Sonia Wright PTA Physical Therapist Assistant                  Therapy Charges for Today       Code Description Service Date Service Provider Modifiers Qty    40653226776 HC PT THER PROC EA 15 MIN 11/13/2023 Sonia Joyce PTA GP 1    33398902018 HC PT THERAPEUTIC ACT EA 15 MIN 11/13/2023 Sonia Joyce PTA GP 1            PT G-Codes  Outcome Measure Options: AM-PAC 6 Clicks Basic Mobility (PT)  AM-PAC 6 Clicks Score (PT): 21  AM-PAC 6 Clicks Score (OT): 14    Sonia Joyce PTA  11/13/2023

## 2023-11-14 ENCOUNTER — READMISSION MANAGEMENT (OUTPATIENT)
Dept: CALL CENTER | Facility: HOSPITAL | Age: 88
End: 2023-11-14
Payer: MEDICARE

## 2023-11-14 VITALS
TEMPERATURE: 97.9 F | WEIGHT: 141.98 LBS | HEART RATE: 89 BPM | SYSTOLIC BLOOD PRESSURE: 134 MMHG | DIASTOLIC BLOOD PRESSURE: 52 MMHG | HEIGHT: 65 IN | RESPIRATION RATE: 16 BRPM | BODY MASS INDEX: 23.65 KG/M2 | OXYGEN SATURATION: 95 %

## 2023-11-14 PROBLEM — N17.9 ACUTE ON CHRONIC KIDNEY FAILURE: Status: RESOLVED | Noted: 2023-11-09 | Resolved: 2023-11-14

## 2023-11-14 PROBLEM — N17.9 ACUTE RENAL FAILURE (ARF): Status: RESOLVED | Noted: 2023-11-13 | Resolved: 2023-11-14

## 2023-11-14 PROBLEM — N18.9 ACUTE ON CHRONIC KIDNEY FAILURE: Status: RESOLVED | Noted: 2023-11-09 | Resolved: 2023-11-14

## 2023-11-14 PROBLEM — N39.0 ACUTE UTI (URINARY TRACT INFECTION): Status: RESOLVED | Noted: 2023-11-09 | Resolved: 2023-11-14

## 2023-11-14 LAB
ALBUMIN SERPL-MCNC: 3.2 G/DL (ref 3.5–5.2)
ANION GAP SERPL CALCULATED.3IONS-SCNC: 11.8 MMOL/L (ref 5–15)
BUN SERPL-MCNC: 67 MG/DL (ref 8–23)
BUN/CREAT SERPL: 26.4 (ref 7–25)
CALCIUM SPEC-SCNC: 8.6 MG/DL (ref 8.6–10.5)
CHLORIDE SERPL-SCNC: 110 MMOL/L (ref 98–107)
CO2 SERPL-SCNC: 21.2 MMOL/L (ref 22–29)
CREAT SERPL-MCNC: 2.54 MG/DL (ref 0.57–1)
DEPRECATED RDW RBC AUTO: 48.8 FL (ref 37–54)
EGFRCR SERPLBLD CKD-EPI 2021: 18 ML/MIN/1.73
ERYTHROCYTE [DISTWIDTH] IN BLOOD BY AUTOMATED COUNT: 13.6 % (ref 12.3–15.4)
GLUCOSE SERPL-MCNC: 84 MG/DL (ref 65–99)
HCT VFR BLD AUTO: 26.5 % (ref 34–46.6)
HGB BLD-MCNC: 8.1 G/DL (ref 12–15.9)
MAGNESIUM SERPL-MCNC: 1.7 MG/DL (ref 1.6–2.4)
MCH RBC QN AUTO: 29.8 PG (ref 26.6–33)
MCHC RBC AUTO-ENTMCNC: 30.6 G/DL (ref 31.5–35.7)
MCV RBC AUTO: 97.4 FL (ref 79–97)
PHOSPHATE SERPL-MCNC: 4.6 MG/DL (ref 2.5–4.5)
PLATELET # BLD AUTO: 227 10*3/MM3 (ref 140–450)
PMV BLD AUTO: 10.2 FL (ref 6–12)
POTASSIUM SERPL-SCNC: 4.5 MMOL/L (ref 3.5–5.2)
RBC # BLD AUTO: 2.72 10*6/MM3 (ref 3.77–5.28)
SODIUM SERPL-SCNC: 143 MMOL/L (ref 136–145)
WBC NRBC COR # BLD: 6.29 10*3/MM3 (ref 3.4–10.8)

## 2023-11-14 PROCEDURE — 96372 THER/PROPH/DIAG INJ SC/IM: CPT

## 2023-11-14 PROCEDURE — 80069 RENAL FUNCTION PANEL: CPT | Performed by: INTERNAL MEDICINE

## 2023-11-14 PROCEDURE — 83735 ASSAY OF MAGNESIUM: CPT | Performed by: INTERNAL MEDICINE

## 2023-11-14 PROCEDURE — G0378 HOSPITAL OBSERVATION PER HR: HCPCS

## 2023-11-14 PROCEDURE — 25010000002 ENOXAPARIN PER 10 MG: Performed by: INTERNAL MEDICINE

## 2023-11-14 PROCEDURE — 85027 COMPLETE CBC AUTOMATED: CPT | Performed by: INTERNAL MEDICINE

## 2023-11-14 RX ORDER — AMOXICILLIN 250 MG
1 CAPSULE ORAL NIGHTLY
Qty: 30 TABLET | Refills: 0 | Status: SHIPPED | OUTPATIENT
Start: 2023-11-14 | End: 2023-12-14

## 2023-11-14 RX ORDER — FUROSEMIDE 80 MG
80 TABLET ORAL DAILY
Qty: 30 TABLET | Refills: 0 | Status: SHIPPED | OUTPATIENT
Start: 2023-11-14 | End: 2023-12-14

## 2023-11-14 RX ADMIN — FUROSEMIDE 80 MG: 40 TABLET ORAL at 08:32

## 2023-11-14 RX ADMIN — LINAGLIPTIN 5 MG: 5 TABLET, FILM COATED ORAL at 08:32

## 2023-11-14 RX ADMIN — ENOXAPARIN SODIUM 30 MG: 100 INJECTION SUBCUTANEOUS at 08:32

## 2023-11-14 RX ADMIN — CARVEDILOL 25 MG: 12.5 TABLET, FILM COATED ORAL at 08:32

## 2023-11-14 RX ADMIN — CILOSTAZOL 100 MG: 100 TABLET ORAL at 08:32

## 2023-11-14 RX ADMIN — ASPIRIN 81 MG CHEWABLE TABLET 81 MG: 81 TABLET CHEWABLE at 08:32

## 2023-11-14 RX ADMIN — HYDRALAZINE HYDROCHLORIDE 25 MG: 25 TABLET, FILM COATED ORAL at 08:32

## 2023-11-14 RX ADMIN — FAMOTIDINE 10 MG: 10 TABLET ORAL at 08:32

## 2023-11-14 NOTE — OUTREACH NOTE
Prep Survey      Flowsheet Row Responses   [facility]   Is LACE score < 7 ? No   Eligibility Readm Mgmt   Discharge diagnosis Acute on chronic kidney failure   Does the patient have one of the following disease processes/diagnoses(primary or secondary)? COPD   Does the patient have Home health ordered? Yes   What is the Home health agency?  [facility]   Is there a DME ordered? No   Medication alerts for this patient see AVS   Prep survey completed? Yes            Alicia MONTELONGO - Registered Nurse

## 2023-11-14 NOTE — DISCHARGE INSTR - APPOINTMENTS
Follow up with Edi García MD on Tuesday Nov.28 at 10:30 [address] [facility] [address] [place] [address]  Follow up with Rafael Lyons MD On Wednesday Nov 22 at 12:30 pm     [address] [facility] [place] [address] [place] [address]

## 2023-11-14 NOTE — PROGRESS NOTES
LOS: 4 days   Patient Care Team:  Rafael Lyons MD as PCP - General (Internal Medicine)  Regis Mckeon MD as Consulting Physician (Radiation Oncology)  Carlton Casillas MD as Consulting Physician (Gastroenterology)  Susan Marcos APRN as Nurse Practitioner (Gastroenterology)    Chief Complaint:  Renal issues    Subjective     Interval History:     Patient Complaints: Chart reviewed.  No new issues overnight  Confusion seems  better, she is sitting in chair , no distress  Some swelling, but better  No SOA    aspirin, 81 mg, Oral, Daily  carvedilol, 25 mg, Oral, BID With Meals  cilostazol, 100 mg, Oral, BID  enoxaparin, 30 mg, Subcutaneous, Daily  famotidine, 10 mg, Oral, Daily  furosemide, 80 mg, Oral, Daily  hydrALAZINE, 25 mg, Oral, BID  linagliptin, 5 mg, Oral, Daily  senna-docusate sodium, 2 tablet, Oral, Nightly  sodium chloride, 10 mL, Intravenous, Q12H      Pharmacy to Dose enoxaparin (LOVENOX),         Review of Systems:   Negative other than was mentioned above.    Objective     Vital Signs  Temp:  [97.3 °F (36.3 °C)-98.2 °F (36.8 °C)] 97.9 °F (36.6 °C)  Heart Rate:  [] 89  Resp:  [16-18] 16  BP: (132-178)/(45-57) 134/52    Intake/Output Summary (Last 24 hours) at 11/14/2023 1345  Last data filed at 11/14/2023 0945  Gross per 24 hour   Intake 360 ml   Output --   Net 360 ml           Physical Exam:     General Appearance:    Alert,  in no acute distress   Head:    Normocephalic, without obvious abnormality, atraumatic   Throat:    no thrush, oral mucosa moist   Neck:   No adenopathy, supple,   Musc:     No CVA tenderness to palpation   Lungs:     Clear to auscultation,respirations unlabored, no wheezes or rhonchi    Heart:    Regular rate and rhythm , normal S1 and S2, no            murmur, no gallop, no rub   Abdomen:     Normal bowel sounds, no masses, soft non-tender   Extremities:    +1 lower extremity edema, no cyanosis   :     No hematuria    Skin:   Dry, No  rash            "       Results Review:    Results from last 7 days   Lab Units 11/14/23  0544 11/13/23  0542 11/12/23  0522   SODIUM mmol/L 143 139 140   POTASSIUM mmol/L 4.5 4.4 4.3   CHLORIDE mmol/L 110* 108* 108*   CO2 mmol/L 21.2* 22.5 21.5*   BUN mg/dL 67* 70* 62*   CREATININE mg/dL 2.54* 2.59* 2.21*   GLUCOSE mg/dL 84 102* 99   CALCIUM mg/dL 8.6 8.3* 8.6     Results from last 7 days   Lab Units 11/14/23  0544 11/13/23  0542 11/11/23  0605   WBC 10*3/mm3 6.29 7.01 6.73   HEMOGLOBIN g/dL 8.1* 8.0* 7.7*   HEMATOCRIT % 26.5* 25.6* 25.9*   PLATELETS 10*3/mm3 227 191 197     Magnesium   Date Value Ref Range Status   11/14/2023 1.7 1.6 - 2.4 mg/dL Final   11/12/2023 1.6 1.6 - 2.4 mg/dL Final     Phosphorus   Date Value Ref Range Status   11/14/2023 4.6 (H) 2.5 - 4.5 mg/dL Final     No results found for: \"BNP\"  Lab Results   Component Value Date    ALBUMIN 3.2 (L) 11/14/2023      Brief Urine Lab Results  (Last result in the past 365 days)        Color   Clarity   Blood   Leuk Est   Nitrite   Protein   CREAT   Urine HCG        11/09/23 0431 Yellow   Clear   Negative   Negative   Negative   100 mg/dL (2+)                    No radiology results from the last 24 hrs         Assessment & Plan       Chronic obstructive pulmonary disease    Type 2 diabetes mellitus without complication    Anemia of chronic disease    - Acute kidney injury, exact etiology is uncertain, no hypotension,  Creatinine improved with IV fluid, back to baseline.  Lasix 80 mg daily now.  - Chronic kidney disease stage IV with previously bland UA and mild proteinuria secondary to hypertensive and diabetic nephropathy in addition to possible ischemic nephropathy from bilateral renal artery stenosis.  Baseline creatinine has been around 2.5.  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  - Hypertension, severe at times.- better  Bilateral renal artery stenosis.  We will check aldosterone/renin ratio.  Could consider spironolactone in " addition.  - CHF, preserved EF with LVH and grade 1 diastolic dysfunction, volume status is generous now.  Resumed lasix  - History of lung cancer.  - Diabetes with complications.  Per primary.  - Anemia, probably worse with volume expansion.  Recheck hemoglobin in AM.  - Generalized weakness  - UTI, treatment per primary.     Plan:  - Continue Lasix 80 mg p.o. daily.    - Okay to DC from renal standpoint.  - Follow-up with me in the office in 2 weeks and call sooner if needed.  Please call with any questions.

## 2023-11-14 NOTE — CONSULTS
Discharge Planning Assessment   [facility]     Patient Name: Charlette Rai  MRN: [id number]  Today's Date: 11/14/2023    Admit Date: 11/9/2023     Discharge Plan       Row Name 11/14/23 1014       Plan    Patient/Family in Agreement with Plan yes    Final Discharge Disposition Code 06 - home with home health care    Final Note Pt to discharge home today and resume [facility] services. No additional needs noted at this time.             Continued Care and Services - Admitted Since 11/9/2023       Home Medical Care       Service Provider Request Status Selected Services Address Phone Fax Patient Preferred    [facility] Accepted N/A [address] [id number] [phone] --       Internal Comment last updated by Melquiades Delgado RN 11/9/2023 1107    Current patient, please follow.                            SHAI Yeboah

## 2023-11-14 NOTE — PLAN OF CARE
Goal Outcome Evaluation:           Progress: improving  Outcome Evaluation: Patient discharging home this shift. VSS

## 2023-11-14 NOTE — DISCHARGE SUMMARY
[facility]         DISCHARGE SUMMARY    Patient Name: Charlette Rai  : 1935  MRN: [id number]    Date of Admission: 2023  Date of Discharge: 2023  Primary Care Physician: Rafael Lyons MD    Consults       Date and Time Order Name Status Description    11/10/2023  9:09 AM Inpatient Nephrology Consult Completed     2023  5:24 AM Internal Medicine (on-call MD unless specified)      10/29/2023  9:42 AM Inpatient Nephrology Consult Completed             Presenting Problem:   Acute on chronic renal failure [N17.9, N18.9]  Acute metabolic encephalopathy [G93.41]  Acute renal failure superimposed on chronic kidney disease, unspecified acute renal failure type, unspecified CKD stage [N17.9, N18.9]  Acute renal failure (ARF) [N17.9]    Active and Resolved Hospital Problems:  Active Hospital Problems    Diagnosis POA    Anemia of chronic disease [D63.8] Yes    Chronic obstructive pulmonary disease [J44.9] Yes    Type 2 diabetes mellitus without complication [E11.9] Yes      Resolved Hospital Problems    Diagnosis POA    **Acute on chronic kidney failure [N17.9, N18.9] Yes    Acute renal failure (ARF) [N17.9] Yes    Acute UTI (urinary tract infection) [N39.0] Unknown         Hospital Course     Hospital Course:  Charlette Rai is a 88 y.o. female admitted to hospital for increased weakness and confusion.  Patient was recently hospitalized for respiratory failure.  Patient has chronic renal insufficiency and chronic kidney disease being followed with nephrologist.  This time patient was more confused daughter brought her in extremely weak.  Work-up revealed acute renal failure on chronic kidney disease.    Patient was admitted to hospital started on IV fluids.  Kidney functions improved patient mental status improved.  Patient was seen by nephrologist.    She is feeling much better in last 48 hours and she would like to go home I offered her to go to the inpatient rehab/nursing  home but patient declined, daughter was notified and patient discharged to home today.        DISCHARGE Follow Up Recommendations for labs and diagnostics:   Discharge to home with home health  Follow-up with me in office next week.  Repeat labs as an outpatient      Day of Discharge     Vital Signs:  Temp:  [97.3 °F (36.3 °C)-98.2 °F (36.8 °C)] 97.9 °F (36.6 °C)  Heart Rate:  [] 89  Resp:  [16-18] 16  BP: (132-178)/(45-57) 134/52    Physical Exam:    Elderly female looks younger than his stated age, not in acute distress.  Heart regular.  Lungs clear but diminished breath sounds..  Abdomen soft.  Nontender.  Extremities no edema      Pertinent  and/or Most Recent Results     LAB RESULTS:      Lab 11/14/23  0544 11/13/23  0542 11/11/23  0605 11/09/23  0437 11/09/23  0421   WBC 6.29 7.01 6.73  --  6.93   HEMOGLOBIN 8.1* 8.0* 7.7*  --  9.1*   HEMATOCRIT 26.5* 25.6* 25.9*  --  29.3*   PLATELETS 227 191 197  --  228   NEUTROS ABS  --  4.94  --   --  4.82   IMMATURE GRANS (ABS)  --  0.04  --   --  0.03   LYMPHS ABS  --  0.92  --   --  0.95   MONOS ABS  --  0.94*  --   --  1.00*   EOS ABS  --  0.14  --   --  0.08   MCV 97.4* 97.3* 100.0*  --  95.8   LACTATE, ARTERIAL  --   --   --  0.91  --          Lab 11/14/23  0544 11/13/23  0542 11/12/23  0522 11/11/23  0605 11/10/23  0612 11/09/23  0905 11/09/23  0437   SODIUM 143 139 140 142 142   < >  --    SODIUM, ARTERIAL  --   --   --   --   --   --  138.1   POTASSIUM 4.5 4.4 4.3 4.2 3.8   < >  --    CHLORIDE 110* 108* 108* 111* 109*   < >  --    CO2 21.2* 22.5 21.5* 21.1* 20.8*   < >  --    ANION GAP 11.8 8.5 10.5 9.9 12.2   < >  --    BUN 67* 70* 62* 67* 73*   < >  --    CREATININE 2.54* 2.59* 2.21* 2.38* 2.66*   < >  --    EGFR 18.0* 17.5* 21.2* 19.4* 17.0*   < >  --    GLUCOSE 84 102* 99 102* 103*   < >  --    GLUCOSE, ARTERIAL  --   --   --   --   --   --  84   CALCIUM 8.6 8.3* 8.6 8.4* 8.5*   < >  --    IONIZED CALCIUM  --   --   --   --   --   --  1.15   MAGNESIUM  1.7  --  1.6 1.6  --   --   --    PHOSPHORUS 4.6*  --   --  4.4  --   --   --     < > = values in this interval not displayed.         Lab 11/14/23  0544 11/09/23 0421   TOTAL PROTEIN  --  7.2   ALBUMIN 3.2* 3.9   GLOBULIN  --  3.3   ALT (SGPT)  --  22   AST (SGOT)  --  17   BILIRUBIN  --  0.3   ALK PHOS  --  201*                     Lab 11/09/23 0437   PH, ARTERIAL 7.392   PCO2, ARTERIAL 34.3*   PO2 ART 67.1*   O2 SATURATION ART 93.1*   HCO3 ART 20.4*   BASE EXCESS ART -3.9*   CARBOXYHEMOGLOBIN 0.1     Brief Urine Lab Results  (Last result in the past 365 days)        Color   Clarity   Blood   Leuk Est   Nitrite   Protein   CREAT   Urine HCG        11/09/23 0431 Yellow   Clear   Negative   Negative   Negative   100 mg/dL (2+)                 Microbiology Results (last 10 days)       Procedure Component Value - Date/Time    COVID-19, FLU A/B, RSV PCR - Swab, Nasopharynx [[id number]]  (Normal) Collected: 11/09/23 0436    Lab Status: Final result Specimen: Swab from Nasopharynx Updated: 11/09/23 0519     COVID19 Not Detected     Influenza A PCR Not Detected     Influenza B PCR Not Detected     RSV, PCR Not Detected    Narrative:      Fact sheet for providers: https://www.fda.gov/media/279168/download    Fact sheet for patients: https://www.fda.gov/media/745550/download    Test performed by PCR.    Urine Culture - Urine, Urine, Clean Catch [[id number]] Collected: 11/09/23 0431    Lab Status: Final result Specimen: Urine, Clean Catch Updated: 11/10/23 1246     Urine Culture 25,000 CFU/mL Mixed Tori Isolated    Narrative:      Specimen contains mixed organisms of questionable pathogenicity suggestive of contamination. If symptoms persist, suggest recollection.  Colonization of the urinary tract without infection is common. Treatment is discouraged unless the patient is symptomatic, pregnant, or undergoing an invasive urologic procedure.            PROCEDURES:    XR Hips Bilateral With or Without Pelvis 2 View    Result  Date: 11/10/2023  Impression:   No acute fracture or acute malalignment is identified.  Moderate to severe osteoarthritis is suggested involving the right hip joint.  Please see above comments for further detail.     Please note that portions of this note were completed with a voice recognition program.  OLMAN BAIG JR, MD       Electronically Signed and Approved By: OLMAN BAIG JR, MD on 11/10/2023 at 23:07              XR Chest 1 View    Result Date: 11/9/2023  Impression:  Favorable progression is suggested radiographically since 10/29/2023 with decreased bilateral airspace opacities.  Minimal if any airspace disease persists on the current study.  The findings may represent interval improvement in pulmonary edema or infectious multifocal pneumonia.  The patient has been extubated since 10/29/2023.      Please note that portions of this note were completed with a voice recognition program.  OLMAN BAIG JR, MD       Electronically Signed and Approved By: OLMAN BAIG JR, MD on 11/09/2023 at 5:44              XR Chest 1 View    Result Date: 10/29/2023  Impression:   1. Interval placement of endotracheal tube and orogastric tubes which are in good position. 2. Increasing bilateral airspace disease favored to be secondary to pulmonary edema 3. Small left pleural effusion       OMAIRA GIRON MD       Electronically Signed and Approved By: OMAIRA GIRON MD on 10/29/2023 at 5:30              Results for orders placed during the hospital encounter of 06/27/22    Doppler Ankle Brachial Index Single Level CAR    Interpretation Summary  · Right Conclusion: The right GILLIAN is moderately reduced. Moderate digital ischemia.  · Left Conclusion: The left GILLIAN is mildly reduced. Mild digital ischemia.      Results for orders placed during the hospital encounter of 06/27/22    Doppler Ankle Brachial Index Single Level CAR    Interpretation Summary  · Right Conclusion: The right GILLIAN is moderately reduced. Moderate digital  ischemia.  · Left Conclusion: The left GILLIAN is mildly reduced. Mild digital ischemia.      Results for orders placed during the hospital encounter of 10/29/23    Adult Transthoracic Echo Complete W/ Cont if Necessary Per Protocol    Interpretation Summary    Left ventricular systolic function is normal. Calculated left ventricular EF = 56.8%    Left ventricular wall thickness is consistent with mild asymmetric hypertrophy.    Left ventricular diastolic function is consistent with (grade I) impaired relaxation.    Left atrial volume is moderately increased.    Moderate aortic valve stenosis is present.    Estimated right ventricular systolic pressure from tricuspid regurgitation is normal (<35 mmHg).    Mild dilation of the aortic root is present.      Labs Pending at Discharge:  Pending Labs       Order Current Status    Aldosterone / Renin Ratio In process              Discharge Details        Discharge Medications        New Medications        Instructions Start Date   sennosides-docusate 8.6-50 MG per tablet  Commonly known as: Senokot S   1 tablet, Oral, Nightly             Changes to Medications        Instructions Start Date   furosemide 80 MG tablet  Commonly known as: Lasix  What changed: when to take this   80 mg, Oral, Daily             Continue These Medications        Instructions Start Date   albuterol sulfate  (90 Base) MCG/ACT inhaler  Commonly known as: PROVENTIL HFA;VENTOLIN HFA;PROAIR HFA   2 puffs, Inhalation, Every 4 Hours PRN      amLODIPine 10 MG tablet  Commonly known as: NORVASC   10 mg, Oral, Daily      aspirin 81 MG chewable tablet   81 mg, Oral, Daily      carvedilol 25 MG tablet  Commonly known as: Coreg   25 mg, Oral, 2 Times Daily With Meals      cilostazol 100 MG tablet  Commonly known as: PLETAL   1 tablet, Oral, 2 times daily      dexlansoprazole 60 MG capsule  Commonly known as: DEXILANT   60 mg, Oral, Daily      ergocalciferol 1.25 MG (66925 UT) capsule  Commonly known as:  ERGOCALCIFEROL   50,000 Units, Oral, Every 14 Days      hydrALAZINE 25 MG tablet  Commonly known as: APRESOLINE   25 mg, Oral, 2 Times Daily      levocetirizine 5 MG tablet  Commonly known as: XYZAL   TAKE 1 TABLET DAILY      linagliptin 5 MG tablet tablet  Commonly known as: Tradjenta   5 mg, Oral, Daily      nitroglycerin 0.4 MG SL tablet  Commonly known as: NITROSTAT   Place 1 tablet under the tongue Every 5 (Five) Minutes As Needed for Chest Pain.      polycarbophil 625 MG tablet tablet   625 mg, Oral, Daily      rosuvastatin 20 MG tablet  Commonly known as: CRESTOR   20 mg, Oral, Daily      sevelamer 800 MG tablet  Commonly known as: RENVELA   800 mg, Oral, 3 Times Daily With Meals      Symbicort 160-4.5 MCG/ACT inhaler  Generic drug: budesonide-formoterol   2 puffs, Inhalation, 2 times daily             Stop These Medications      levoFLOXacin 500 MG tablet  Commonly known as: Levaquin              No Known Allergies      [place] [facility] [place]:    Heart healthy    Discharge Activity:     Activity Instructions       Activity as Tolerated              Future Appointments   Date Time Provider Department Center   1/19/2024  1:00 PM [place] [facility] [place] [facility]       Additional Instructions for the Follow-ups that You Need to Schedule       Discharge Follow-up with PCP   As directed       Currently Documented PCP:    Rafael Lyons MD    PCP Phone Number:    [phone]     Follow Up Details: next week        Discharge Follow-up with Specified Provider: Dr Garg in 2 week   As directed      To: Dr Garg in 2 week                Time spent on Discharge including face to face service: 35 minutes.            I have dictated this note utilizing Dragon Dictation.             Please note that portions of this note were completed with a voice recognition program.             Part of this note may be an electronic transcription/translation of spoken language to printed text          using the Dragon Dictation System.       Electronically signed by Rafael Lyons MD, 11/14/23, 12:20 PM EST.

## 2023-11-17 ENCOUNTER — READMISSION MANAGEMENT (OUTPATIENT)
Dept: CALL CENTER | Facility: HOSPITAL | Age: 88
End: 2023-11-17
Payer: MEDICARE

## 2023-11-17 NOTE — OUTREACH NOTE
COPD/PN Week 1 Survey      Flowsheet Row Responses   [facility] patient discharged from? [facility]   Does the patient have one of the following disease processes/diagnoses(primary or secondary)? COPD   Week 1 attempt successful? Yes   Call start time 0916   Call end time 0928   Is patient permission given to speak with other caregiver? Yes   Person spoke with today (if not patient) and relationship Sanjuana-daughter.   Meds reviewed with patient/caregiver? Yes   Is the patient having any side effects they believe may be caused by any medication additions or changes? No   Does the patient have all medications ordered at discharge? Yes   Is the patient taking all medications as directed (includes completed medication regime)? Yes   Comments regarding appointments PCP appt 11/22/23. Dr García appt next week.   Does the patient have a primary care provider?  Yes   Does the patient have an appointment with their PCP or specialist within 7 days of discharge? Yes   Has the patient kept scheduled appointments due by today? N/A   Has home health visited the patient within 72 hours of discharge? Yes   Home health comments PT has visited.  nurse will visit today.   Pulse Ox monitoring None   Psychosocial issues? No   Psychosocial comments Lives with spouse. Daughter lives next door.   Did the patient receive a copy of their discharge instructions? Yes   Nursing interventions Reviewed instructions with patient   What is the patient's perception of their health status since discharge? Improving   Nursing Interventions Nurse provided patient education   Are the patient's immunizations up to date?  No   Nursing interventions Educated on importance of maintaining up to date immunizations as advised by provider, Advised patient to discuss with provider at next visit   If the patient is a current smoker, are they able to teach back resources for cessation? Not a smoker   Is the patient/caregiver able to teach back the hierarchy of  who to call/visit for symptoms/problems? PCP, Specialist, Home health nurse, Urgent Care, ED, 911 Yes   Is the patient able to teach back COPD zones? No   Nursing interventions Education provided on various zones   Patient reports what zone on this call? Green Zone   Green Zone Reports doing well, Breathing without shortness of breath, Usual activity and exercise level, Usual amounts of cough and phlegm/mucous, Slept well last night, Appetite is good   Green Zone interventions: Take daily medications, Use oxygen as prescribed, Continue regular exercise/diet plan, At all times avoid cigarette smoking, vaping and inhaled irritants   Week 1 call completed? Yes   Is the patient interested in additional calls from an ambulatory ? No   Wrap up additional comments Daughter states patient is improving. States patient has a dry cough, sore throat that will be evaluated by HH nurse today. Denies any fever or chest pain. Denies any needs today. States would appreciate another f/u call next week.   Call end time 0928            Kandice WINN - Registered Nurse

## 2023-11-18 LAB
ALDOST SERPL-MCNC: 1.3 NG/DL (ref 0–30)
ALDOST/RENIN PLAS-RTO: 1.7 {RATIO} (ref 0–30)
RENIN PLAS-CCNC: 0.75 NG/ML/HR (ref 0.17–5.38)

## 2023-11-25 ENCOUNTER — APPOINTMENT (OUTPATIENT)
Dept: GENERAL RADIOLOGY | Facility: HOSPITAL | Age: 88
DRG: 981 | End: 2023-11-25
Payer: MEDICARE

## 2023-11-25 ENCOUNTER — HOSPITAL ENCOUNTER (INPATIENT)
Facility: HOSPITAL | Age: 88
LOS: 10 days | Discharge: HOME HEALTH | DRG: 981 | End: 2023-12-05
Attending: EMERGENCY MEDICINE | Admitting: INTERNAL MEDICINE
Payer: MEDICARE

## 2023-11-25 DIAGNOSIS — R26.2 DIFFICULTY WALKING: ICD-10-CM

## 2023-11-25 DIAGNOSIS — N18.4 STAGE 4 CHRONIC KIDNEY DISEASE: ICD-10-CM

## 2023-11-25 DIAGNOSIS — R09.02 HYPOXIA: ICD-10-CM

## 2023-11-25 DIAGNOSIS — J44.1 ACUTE EXACERBATION OF CHRONIC OBSTRUCTIVE PULMONARY DISEASE (COPD): Primary | ICD-10-CM

## 2023-11-25 LAB
ALBUMIN SERPL-MCNC: 3.5 G/DL (ref 3.5–5.2)
ALBUMIN/GLOB SERPL: 1.1 G/DL
ALP SERPL-CCNC: 183 U/L (ref 39–117)
ALT SERPL W P-5'-P-CCNC: 9 U/L (ref 1–33)
ANION GAP SERPL CALCULATED.3IONS-SCNC: 13 MMOL/L (ref 5–15)
ARTERIAL PATENCY WRIST A: POSITIVE
AST SERPL-CCNC: 11 U/L (ref 1–32)
BASE EXCESS BLDA CALC-SCNC: -5.7 MMOL/L (ref -2–2)
BASOPHILS # BLD AUTO: 0.03 10*3/MM3 (ref 0–0.2)
BASOPHILS NFR BLD AUTO: 0.3 % (ref 0–1.5)
BDY SITE: ABNORMAL
BILIRUB SERPL-MCNC: 0.2 MG/DL (ref 0–1.2)
BUN SERPL-MCNC: 80 MG/DL (ref 8–23)
BUN/CREAT SERPL: 27.4 (ref 7–25)
CA-I BLDA-SCNC: 1.14 MMOL/L (ref 1.13–1.32)
CALCIUM SPEC-SCNC: 8.4 MG/DL (ref 8.6–10.5)
CHLORIDE BLDA-SCNC: 106 MMOL/L (ref 98–106)
CHLORIDE SERPL-SCNC: 107 MMOL/L (ref 98–107)
CO2 SERPL-SCNC: 20 MMOL/L (ref 22–29)
COHGB MFR BLD: 0 % (ref 0–1.5)
CREAT SERPL-MCNC: 2.92 MG/DL (ref 0.57–1)
DEPRECATED RDW RBC AUTO: 47.8 FL (ref 37–54)
EGFRCR SERPLBLD CKD-EPI 2021: 15.2 ML/MIN/1.73
EOSINOPHIL # BLD AUTO: 0.04 10*3/MM3 (ref 0–0.4)
EOSINOPHIL NFR BLD AUTO: 0.4 % (ref 0.3–6.2)
ERYTHROCYTE [DISTWIDTH] IN BLOOD BY AUTOMATED COUNT: 13.3 % (ref 12.3–15.4)
FHHB: 6.5 % (ref 0–5)
GAS FLOW AIRWAY: 3 LPM
GEN 5 2HR TROPONIN T REFLEX: 81 NG/L
GLOBULIN UR ELPH-MCNC: 3.1 GM/DL
GLUCOSE BLDA-MCNC: 173 MG/DL (ref 65–99)
GLUCOSE SERPL-MCNC: 204 MG/DL (ref 65–99)
HCO3 BLDA-SCNC: 19.8 MMOL/L (ref 22–26)
HCT VFR BLD AUTO: 26.9 % (ref 34–46.6)
HGB BLD-MCNC: 8.1 G/DL (ref 12–15.9)
HGB BLDA-MCNC: 9.5 G/DL (ref 11.7–14.6)
HOLD SPECIMEN: NORMAL
HOLD SPECIMEN: NORMAL
IMM GRANULOCYTES # BLD AUTO: 0.05 10*3/MM3 (ref 0–0.05)
IMM GRANULOCYTES NFR BLD AUTO: 0.5 % (ref 0–0.5)
INHALED O2 CONCENTRATION: 32 %
LACTATE BLDA-SCNC: 0.74 MMOL/L (ref 0.5–2)
LYMPHOCYTES # BLD AUTO: 0.43 10*3/MM3 (ref 0.7–3.1)
LYMPHOCYTES NFR BLD AUTO: 4.5 % (ref 19.6–45.3)
MCH RBC QN AUTO: 29.8 PG (ref 26.6–33)
MCHC RBC AUTO-ENTMCNC: 30.1 G/DL (ref 31.5–35.7)
MCV RBC AUTO: 98.9 FL (ref 79–97)
METHGB BLD QL: 0.2 % (ref 0–1.5)
MODALITY: ABNORMAL
MONOCYTES # BLD AUTO: 0.92 10*3/MM3 (ref 0.1–0.9)
MONOCYTES NFR BLD AUTO: 9.7 % (ref 5–12)
NEUTROPHILS NFR BLD AUTO: 8.03 10*3/MM3 (ref 1.7–7)
NEUTROPHILS NFR BLD AUTO: 84.6 % (ref 42.7–76)
NOTE: ABNORMAL
NRBC BLD AUTO-RTO: 0 /100 WBC (ref 0–0.2)
NT-PROBNP SERPL-MCNC: 7806 PG/ML (ref 0–1800)
OXYHGB MFR BLDV: 93.3 % (ref 94–99)
PCO2 BLDA: 38.8 MM HG (ref 35–45)
PH BLDA: 7.33 PH UNITS (ref 7.35–7.45)
PLATELET # BLD AUTO: 191 10*3/MM3 (ref 140–450)
PMV BLD AUTO: 9.6 FL (ref 6–12)
PO2 BLD: 223 MM[HG] (ref 0–500)
PO2 BLDA: 71.5 MM HG (ref 80–100)
POTASSIUM BLDA-SCNC: 5.72 MMOL/L (ref 3.5–5)
POTASSIUM SERPL-SCNC: 5.3 MMOL/L (ref 3.5–5.2)
PROT SERPL-MCNC: 6.6 G/DL (ref 6–8.5)
RBC # BLD AUTO: 2.72 10*6/MM3 (ref 3.77–5.28)
SAO2 % BLDCOA: 93.5 % (ref 95–99)
SODIUM BLDA-SCNC: 139.4 MMOL/L (ref 136–146)
SODIUM SERPL-SCNC: 140 MMOL/L (ref 136–145)
TROPONIN T DELTA: -2 NG/L
TROPONIN T SERPL HS-MCNC: 83 NG/L
WBC NRBC COR # BLD AUTO: 9.5 10*3/MM3 (ref 3.4–10.8)
WHOLE BLOOD HOLD COAG: NORMAL
WHOLE BLOOD HOLD SPECIMEN: NORMAL

## 2023-11-25 PROCEDURE — 36415 COLL VENOUS BLD VENIPUNCTURE: CPT

## 2023-11-25 PROCEDURE — 80053 COMPREHEN METABOLIC PANEL: CPT | Performed by: EMERGENCY MEDICINE

## 2023-11-25 PROCEDURE — 94640 AIRWAY INHALATION TREATMENT: CPT

## 2023-11-25 PROCEDURE — 99285 EMERGENCY DEPT VISIT HI MDM: CPT

## 2023-11-25 PROCEDURE — 71045 X-RAY EXAM CHEST 1 VIEW: CPT

## 2023-11-25 PROCEDURE — 25010000002 METHYLPREDNISOLONE PER 40 MG: Performed by: INTERNAL MEDICINE

## 2023-11-25 PROCEDURE — 83050 HGB METHEMOGLOBIN QUAN: CPT | Performed by: INTERNAL MEDICINE

## 2023-11-25 PROCEDURE — 84484 ASSAY OF TROPONIN QUANT: CPT | Performed by: EMERGENCY MEDICINE

## 2023-11-25 PROCEDURE — 25010000002 ENOXAPARIN PER 10 MG: Performed by: INTERNAL MEDICINE

## 2023-11-25 PROCEDURE — 93005 ELECTROCARDIOGRAM TRACING: CPT | Performed by: EMERGENCY MEDICINE

## 2023-11-25 PROCEDURE — 82375 ASSAY CARBOXYHB QUANT: CPT | Performed by: INTERNAL MEDICINE

## 2023-11-25 PROCEDURE — 94799 UNLISTED PULMONARY SVC/PX: CPT

## 2023-11-25 PROCEDURE — 36600 WITHDRAWAL OF ARTERIAL BLOOD: CPT | Performed by: INTERNAL MEDICINE

## 2023-11-25 PROCEDURE — 85025 COMPLETE CBC W/AUTO DIFF WBC: CPT | Performed by: EMERGENCY MEDICINE

## 2023-11-25 PROCEDURE — 83880 ASSAY OF NATRIURETIC PEPTIDE: CPT | Performed by: EMERGENCY MEDICINE

## 2023-11-25 PROCEDURE — 25010000002 METHYLPREDNISOLONE PER 125 MG: Performed by: EMERGENCY MEDICINE

## 2023-11-25 PROCEDURE — 82805 BLOOD GASES W/O2 SATURATION: CPT | Performed by: INTERNAL MEDICINE

## 2023-11-25 PROCEDURE — 94761 N-INVAS EAR/PLS OXIMETRY MLT: CPT

## 2023-11-25 RX ORDER — CARVEDILOL 12.5 MG/1
25 TABLET ORAL 2 TIMES DAILY WITH MEALS
Status: DISCONTINUED | OUTPATIENT
Start: 2023-11-25 | End: 2023-12-05 | Stop reason: HOSPADM

## 2023-11-25 RX ORDER — IPRATROPIUM BROMIDE AND ALBUTEROL SULFATE 2.5; .5 MG/3ML; MG/3ML
3 SOLUTION RESPIRATORY (INHALATION)
Status: DISCONTINUED | OUTPATIENT
Start: 2023-11-25 | End: 2023-11-29

## 2023-11-25 RX ORDER — TRAMADOL HYDROCHLORIDE 50 MG/1
50 TABLET ORAL EVERY 8 HOURS PRN
Status: DISCONTINUED | OUTPATIENT
Start: 2023-11-25 | End: 2023-12-05 | Stop reason: HOSPADM

## 2023-11-25 RX ORDER — IPRATROPIUM BROMIDE AND ALBUTEROL SULFATE 2.5; .5 MG/3ML; MG/3ML
3 SOLUTION RESPIRATORY (INHALATION)
Status: COMPLETED | OUTPATIENT
Start: 2023-11-25 | End: 2023-11-25

## 2023-11-25 RX ORDER — ACETAMINOPHEN 650 MG/1
650 SUPPOSITORY RECTAL EVERY 4 HOURS PRN
Status: DISCONTINUED | OUTPATIENT
Start: 2023-11-25 | End: 2023-12-05 | Stop reason: HOSPADM

## 2023-11-25 RX ORDER — HYDRALAZINE HYDROCHLORIDE 25 MG/1
25 TABLET, FILM COATED ORAL 2 TIMES DAILY
Status: DISCONTINUED | OUTPATIENT
Start: 2023-11-25 | End: 2023-12-05 | Stop reason: HOSPADM

## 2023-11-25 RX ORDER — ONDANSETRON 2 MG/ML
4 INJECTION INTRAMUSCULAR; INTRAVENOUS EVERY 6 HOURS PRN
Status: DISCONTINUED | OUTPATIENT
Start: 2023-11-25 | End: 2023-12-05 | Stop reason: HOSPADM

## 2023-11-25 RX ORDER — CETIRIZINE HYDROCHLORIDE 10 MG/1
10 TABLET ORAL DAILY
Status: DISCONTINUED | OUTPATIENT
Start: 2023-11-26 | End: 2023-12-05 | Stop reason: HOSPADM

## 2023-11-25 RX ORDER — SODIUM CHLORIDE 0.9 % (FLUSH) 0.9 %
10 SYRINGE (ML) INJECTION AS NEEDED
Status: DISCONTINUED | OUTPATIENT
Start: 2023-11-25 | End: 2023-12-05 | Stop reason: HOSPADM

## 2023-11-25 RX ORDER — SEVELAMER CARBONATE 800 MG/1
800 TABLET, FILM COATED ORAL
Status: DISCONTINUED | OUTPATIENT
Start: 2023-11-25 | End: 2023-12-05 | Stop reason: HOSPADM

## 2023-11-25 RX ORDER — SODIUM CHLORIDE 0.9 % (FLUSH) 0.9 %
10 SYRINGE (ML) INJECTION EVERY 12 HOURS SCHEDULED
Status: DISCONTINUED | OUTPATIENT
Start: 2023-11-25 | End: 2023-12-05 | Stop reason: HOSPADM

## 2023-11-25 RX ORDER — AMOXICILLIN 250 MG
1 CAPSULE ORAL NIGHTLY
Status: DISCONTINUED | OUTPATIENT
Start: 2023-11-25 | End: 2023-12-05 | Stop reason: HOSPADM

## 2023-11-25 RX ORDER — FAMOTIDINE 20 MG/1
20 TABLET, FILM COATED ORAL DAILY
Status: DISCONTINUED | OUTPATIENT
Start: 2023-11-25 | End: 2023-11-25

## 2023-11-25 RX ORDER — SODIUM CHLORIDE 9 MG/ML
40 INJECTION, SOLUTION INTRAVENOUS AS NEEDED
Status: DISCONTINUED | OUTPATIENT
Start: 2023-11-25 | End: 2023-12-05 | Stop reason: HOSPADM

## 2023-11-25 RX ORDER — ACETAMINOPHEN 325 MG/1
650 TABLET ORAL EVERY 4 HOURS PRN
Status: DISCONTINUED | OUTPATIENT
Start: 2023-11-25 | End: 2023-12-05 | Stop reason: HOSPADM

## 2023-11-25 RX ORDER — NALOXONE HCL 0.4 MG/ML
0.4 VIAL (ML) INJECTION
Status: DISCONTINUED | OUTPATIENT
Start: 2023-11-25 | End: 2023-12-05 | Stop reason: HOSPADM

## 2023-11-25 RX ORDER — METHYLPREDNISOLONE SODIUM SUCCINATE 125 MG/2ML
125 INJECTION, POWDER, LYOPHILIZED, FOR SOLUTION INTRAMUSCULAR; INTRAVENOUS ONCE
Status: COMPLETED | OUTPATIENT
Start: 2023-11-25 | End: 2023-11-25

## 2023-11-25 RX ORDER — METHYLPREDNISOLONE SODIUM SUCCINATE 40 MG/ML
40 INJECTION, POWDER, LYOPHILIZED, FOR SOLUTION INTRAMUSCULAR; INTRAVENOUS EVERY 8 HOURS
Status: DISCONTINUED | OUTPATIENT
Start: 2023-11-25 | End: 2023-11-28

## 2023-11-25 RX ORDER — CILOSTAZOL 100 MG/1
100 TABLET ORAL 2 TIMES DAILY
Status: DISCONTINUED | OUTPATIENT
Start: 2023-11-25 | End: 2023-12-05 | Stop reason: HOSPADM

## 2023-11-25 RX ORDER — PANTOPRAZOLE SODIUM 40 MG/1
40 TABLET, DELAYED RELEASE ORAL
Status: DISCONTINUED | OUTPATIENT
Start: 2023-11-26 | End: 2023-12-05 | Stop reason: HOSPADM

## 2023-11-25 RX ORDER — HYDROCODONE BITARTRATE AND ACETAMINOPHEN 5; 325 MG/1; MG/1
1 TABLET ORAL EVERY 4 HOURS PRN
Status: DISCONTINUED | OUTPATIENT
Start: 2023-11-25 | End: 2023-11-25 | Stop reason: SDUPTHER

## 2023-11-25 RX ORDER — TRAMADOL HYDROCHLORIDE 50 MG/1
50 TABLET ORAL EVERY 8 HOURS PRN
COMMUNITY
Start: 2023-11-22

## 2023-11-25 RX ORDER — ROSUVASTATIN CALCIUM 20 MG/1
20 TABLET, COATED ORAL NIGHTLY
Status: DISCONTINUED | OUTPATIENT
Start: 2023-11-25 | End: 2023-12-05 | Stop reason: HOSPADM

## 2023-11-25 RX ORDER — ACETAMINOPHEN 160 MG/5ML
650 SOLUTION ORAL EVERY 4 HOURS PRN
Status: DISCONTINUED | OUTPATIENT
Start: 2023-11-25 | End: 2023-12-05 | Stop reason: HOSPADM

## 2023-11-25 RX ORDER — ENOXAPARIN SODIUM 100 MG/ML
30 INJECTION SUBCUTANEOUS DAILY
Status: DISCONTINUED | OUTPATIENT
Start: 2023-11-25 | End: 2023-11-28

## 2023-11-25 RX ADMIN — ROSUVASTATIN 20 MG: 20 TABLET, FILM COATED ORAL at 21:56

## 2023-11-25 RX ADMIN — CILOSTAZOL 100 MG: 100 TABLET ORAL at 21:56

## 2023-11-25 RX ADMIN — IPRATROPIUM BROMIDE AND ALBUTEROL SULFATE 3 ML: .5; 3 SOLUTION RESPIRATORY (INHALATION) at 14:47

## 2023-11-25 RX ADMIN — IPRATROPIUM BROMIDE AND ALBUTEROL SULFATE 3 ML: .5; 3 SOLUTION RESPIRATORY (INHALATION) at 14:49

## 2023-11-25 RX ADMIN — METHYLPREDNISOLONE SODIUM SUCCINATE 125 MG: 125 INJECTION INTRAMUSCULAR; INTRAVENOUS at 14:35

## 2023-11-25 RX ADMIN — HYDRALAZINE HYDROCHLORIDE 25 MG: 25 TABLET, FILM COATED ORAL at 21:56

## 2023-11-25 RX ADMIN — SEVELAMER CARBONATE 800 MG: 800 TABLET, FILM COATED ORAL at 21:56

## 2023-11-25 RX ADMIN — ENOXAPARIN SODIUM 30 MG: 100 INJECTION SUBCUTANEOUS at 21:56

## 2023-11-25 RX ADMIN — TRAMADOL HYDROCHLORIDE 50 MG: 50 TABLET ORAL at 21:56

## 2023-11-25 RX ADMIN — Medication 10 ML: at 21:56

## 2023-11-25 RX ADMIN — LINAGLIPTIN 5 MG: 5 TABLET, FILM COATED ORAL at 21:56

## 2023-11-25 RX ADMIN — METHYLPREDNISOLONE SODIUM SUCCINATE 40 MG: 40 INJECTION INTRAMUSCULAR; INTRAVENOUS at 22:05

## 2023-11-25 RX ADMIN — IPRATROPIUM BROMIDE AND ALBUTEROL SULFATE 3 ML: .5; 3 SOLUTION RESPIRATORY (INHALATION) at 20:12

## 2023-11-25 NOTE — H&P
[facility]   HISTORY AND PHYSICAL    Patient Name: Charlette Rai  : 1935  MRN: [id number]  Primary Care Physician:  Rafael Lyons MD  Date of admission: 2023    Subjective   Subjective     Chief Complaint:   Shortness of breath      HPI:    Charlette Rai is a 88 y.o. female with known history of CHF, COPD and multiple other medical problems, who has been admitted to the hospital twice in last 1 month return to ER with increasing shortness of breath.  Patient was evaluated by ER physician, patient could not walk her sats were dropping into 83 she had home O2 supplement but she was getting out of breath and ER physician felt necessary that patient needs to be admitted.  When I saw patient she is awake alert and comfortable in bed.  She denies any shortness of breath at rest but when she exerts she gets short of breath.  She cannot walk to bathroom.  She has some chest tightness on and off.  There is no fever chills, no cough no wheezing.        Review of Systems:      Weakness and fatigue  Shortness of breath with exertion  Orthopnea and PND  Swelling of the limbs  Anxiety and nervousness and confusion at times    Personal History     Past Medical History:   Diagnosis Date    Allergic rhinitis     Anemia     Arthritis     Asthma     USE INHALERS AND NEBULIZERS    Back pain     Bladder disorder     Cancer     LEFT LUNG CANCER  SURGERY AND CHEMO DONE  AND CURRENTLY   RIGHT LUNG CANCER HAS ONLY RECEIVED RADIATION THUS FAR    Chronic kidney disease     stage 3    CKD (chronic kidney disease) stage 4, GFR 15-29 ml/min     Condition not found     ulcer    Congestive heart failure (CHF)     FOLLOWED BY DR AQUINO. DENIES CP BUT DOES HAVE SOA CHRONIC ISSUE COPD/LUNG CANCER    COPD (chronic obstructive pulmonary disease)     FOLLOWED BY DR MARIAJOSE BAILEY    Coronary artery disease     DENIES CP BUT DOES GET SOA MOST OF THE TIME WITH EXERTION BUT OCC AT REST CHRONIC ISSUE COPD/LUNG CANCER    Deep vein  thrombosis     Diabetes mellitus     DOES NOT CHECK BS DAILY    Disease of thyroid gland     HYPOTHYROIDISM    Essential hypertension     Gastric ulcer     GERD (gastroesophageal reflux disease)     Heart murmur     History of transfusion     NO ISSUES POST TRANSFUSION WAS MANY YEARS AGO    Hyperlipemia     Leukocytopenia     FOLLOWED BY DR MIR BAILEY    Limb swelling     Lumbago     Lumbar spinal stenosis     Lung cancer     Migraine headache     Multiple joint pain     On home oxygen therapy     3L/NC PRN    Osteopenia     Reflux esophagitis     Shortness of breath     Thyroid nodule     HAS ULTRASOUND YEARLY BEING MONITORED    Vascular disease     Vitamin D deficiency        Past Surgical History:   Procedure Laterality Date    ABDOMINAL SURGERY      APPENDECTOMY      ARTERIOVENOUS FISTULA/SHUNT SURGERY Left 05/10/2022    Procedure: Left basilic vein transposition;  Surgeon: Wan Barksdale MD;  Location: [facility] MAIN OR;  Service: Vascular;  Laterality: Left;    ARTERIOVENOUS FISTULA/SHUNT SURGERY Left 3/23/2023    Procedure: Ligation of left arm arteriovenous fistula;  Surgeon: Wan Barksdale MD;  Location: [facility] MAIN OR;  Service: Vascular;  Laterality: Left;    CARDIAC CATHETERIZATION  1996    CARDIAC SURGERY      CARDIAC SURGERY      fluid drained from heart    CATARACT EXTRACTION, BILATERAL  2003    COLONOSCOPY  2014    ENDOSCOPY  2016 2019    FEMORAL ARTERY STENT Bilateral     HYSTERECTOMY      LUNG BIOPSY Left 2005    lobectomy upper lung caner    LUNG VOLUME REDUCTION      OTHER SURGICAL HISTORY      artifical joints/limbs    REPLACEMENT TOTAL KNEE Left 2016    UPPER GASTROINTESTINAL ENDOSCOPY         Family History: family history includes Arthritis in her father and mother; Cancer in her brother, brother, brother, and brother; Diabetes in her brother; Other in her brother; Prostate cancer in her brother and brother. Otherwise pertinent FHx was reviewed and not pertinent to current issue.    Social  History:  reports that she quit smoking about 19 years ago. Her smoking use included cigarettes. She started smoking about 71 years ago. She has a 30.00 pack-year smoking history. She has never used smokeless tobacco. She reports that she does not drink alcohol and does not use drugs.    Home Medications:  albuterol sulfate HFA, amLODIPine, aspirin, budesonide-formoterol, carvedilol, cilostazol, dexlansoprazole, ergocalciferol, furosemide, hydrALAZINE, levocetirizine, linagliptin, nitroglycerin, polycarbophil, rosuvastatin, sennosides-docusate, sevelamer, and traMADol      Allergies:  No Known Allergies    Objective   Objective     Vitals:   Temp:  [97.7 °F (36.5 °C)-98.2 °F (36.8 °C)] 97.7 °F (36.5 °C)  Heart Rate:  [] 102  Resp:  [16-20] 20  BP: (111-144)/(43-60) 144/50  Flow (L/min):  [2.5-3] 3    Physical Exam    Elderly female looks of her stated age, not in acute distress.  Neck is supple with JVD.  Heart is regular with grade 3 systolic murmur.  Lungs diminished breath sounds bilaterally with few crackles at bases.  Abdomen soft and obese extremities 2+ edema bilaterally in upper and lower extremities both.      I have personally reviewed the results from the time of this admission to 11/25/2023 21:02 EST and agree with these findings:  [x]  Laboratory  []  Microbiology  [x]  Radiology  []  EKG/Telemetry   []  Cardiology/Vascular   []  Pathology  []  Old records  []  Other:    CBC:    WBC   Date Value Ref Range Status   11/25/2023 9.50 3.40 - 10.80 10*3/mm3 Final     RBC   Date Value Ref Range Status   11/25/2023 2.72 (L) 3.77 - 5.28 10*6/mm3 Final     Hemoglobin   Date Value Ref Range Status   11/25/2023 8.1 (L) 12.0 - 15.9 g/dL Final     Hematocrit   Date Value Ref Range Status   11/25/2023 26.9 (L) 34.0 - 46.6 % Final     MCV   Date Value Ref Range Status   11/25/2023 98.9 (H) 79.0 - 97.0 fL Final     MCH   Date Value Ref Range Status   11/25/2023 29.8 26.6 - 33.0 pg Final     MCHC   Date Value Ref  Range Status   11/25/2023 30.1 (L) 31.5 - 35.7 g/dL Final     RDW   Date Value Ref Range Status   11/25/2023 13.3 12.3 - 15.4 % Final     RDW-SD   Date Value Ref Range Status   11/25/2023 47.8 37.0 - 54.0 fl Final     MPV   Date Value Ref Range Status   11/25/2023 9.6 6.0 - 12.0 fL Final     Platelets   Date Value Ref Range Status   11/25/2023 191 140 - 450 10*3/mm3 Final     Neutrophil %   Date Value Ref Range Status   11/25/2023 84.6 (H) 42.7 - 76.0 % Final     Lymphocyte %   Date Value Ref Range Status   11/25/2023 4.5 (L) 19.6 - 45.3 % Final     Monocyte %   Date Value Ref Range Status   11/25/2023 9.7 5.0 - 12.0 % Final     Eosinophil %   Date Value Ref Range Status   11/25/2023 0.4 0.3 - 6.2 % Final     Basophil %   Date Value Ref Range Status   11/25/2023 0.3 0.0 - 1.5 % Final     Immature Grans %   Date Value Ref Range Status   11/25/2023 0.5 0.0 - 0.5 % Final     Neutrophils, Absolute   Date Value Ref Range Status   11/25/2023 8.03 (H) 1.70 - 7.00 10*3/mm3 Final     Lymphocytes, Absolute   Date Value Ref Range Status   11/25/2023 0.43 (L) 0.70 - 3.10 10*3/mm3 Final     Monocytes, Absolute   Date Value Ref Range Status   11/25/2023 0.92 (H) 0.10 - 0.90 10*3/mm3 Final     Eosinophils, Absolute   Date Value Ref Range Status   11/25/2023 0.04 0.00 - 0.40 10*3/mm3 Final     Basophils, Absolute   Date Value Ref Range Status   11/25/2023 0.03 0.00 - 0.20 10*3/mm3 Final     Immature Grans, Absolute   Date Value Ref Range Status   11/25/2023 0.05 0.00 - 0.05 10*3/mm3 Final     nRBC   Date Value Ref Range Status   11/25/2023 0.0 0.0 - 0.2 /100 WBC Final        BMP:    Lab Results   Component Value Date    GLUCOSE 204 (H) 11/25/2023    BUN 80 (H) 11/25/2023    CREATININE 2.92 (H) 11/25/2023    EGFRIFNONA 17 (L) 01/10/2022    BCR 27.4 (H) 11/25/2023    K 5.3 (H) 11/25/2023    CO2 20.0 (L) 11/25/2023    CALCIUM 8.4 (L) 11/25/2023    ALBUMIN 3.5 11/25/2023    LABIL2 1.5 05/05/2021    AST 11 11/25/2023    ALT 9 11/25/2023         No radiology results for the last day           Assessment & Plan   Assessment / Plan       Current Diagnosis:  Active Hospital Problems    Diagnosis     COPD exacerbation     Hypoxia     Acute exacerbation of CHF (congestive heart failure)     Essential hypertension     GERD (gastroesophageal reflux disease)     Stage 4 chronic kidney disease     Type 2 diabetes mellitus without complication      Plan:   Patient with shortness of breath especially with exertion.  Oxygen dropping into 85 and 83's.  Most likely combination of CHF and COPD.  Chest x-ray does not show any volume issues.  I will proceed with CT of the chest.  ABGs.  IV diuretics as well as steroids for treatment of both conditions.  Neb treatments as needed O2 supplementation.  Patient has recurrent admissions to hospital.  In last month she has third admission.  Needs nursing home and inpatient rehab but has refused during the last 2 admissions.  Will discuss with patient and family again.    Will consult nephrology with assistance of management of kidney issues with diuretic use..        DVT prophylaxis:  Medical DVT prophylaxis orders are present.    GI Prophylaxis:       Protonix    CODE STATUS:    Code Status (Patient has no pulse and is not breathing): CPR (Attempt to Resuscitate)  Medical Interventions (Patient has pulse or is breathing): Full Support    Admission Status:  I believe this patient meets inpatient status.             I have dictated this note utilizing Dragon Dictation.             Please note that portions of this note were completed with a voice recognition program.             Part of this note may be an electronic transcription/translation of spoken language to printed text         using the Dragon Dictation System.       Electronically signed by Rafael Lyons MD, 11/25/23, 6:21 PM EST.    Total time spent with in evaluation and management:

## 2023-11-25 NOTE — ED NOTES
Patient was ambulated approximately 10 feet while wearing 3L NC. Pulse oximetry reading averaged around 88%. Patient did drop down to 87% at this morning. Patient did have increase in work of breathing.

## 2023-11-25 NOTE — ED PROVIDER NOTES
Time: 1:13 PM EST  Date of encounter:  11/25/2023  Independent Historian/Clinical History and Information was obtained by:   Patient and Family    History is limited by: N/A    Chief Complaint: Shortness of breath.    History of Present Illness:  Patient is a 88 y.o. year old female who presents to the emergency department for evaluation of Shortness of breath.  This patient has a history of multiple medical conditions including chronic kidney disease and a history of lung cancer as well as COPD.  She has had 2 recent admissions to the hospital from 10 29-11 5 and also from 11 9 through 1114 for shortness of breath.  The patient presents today because she became extremely short of breath at home.  She has had no fever chills chest pain or vomiting or diarrhea.  She denies any new cough or sputum production.  She is on 3 L of oxygen at home.    HPI    Patient Care Team  Primary Care Provider: Rafael Lyons MD    Past Medical History:     No Known Allergies  Past Medical History:   Diagnosis Date    Allergic rhinitis     Anemia     Arthritis     Asthma     USE INHALERS AND NEBULIZERS    Back pain     Bladder disorder     Cancer     LEFT LUNG CANCER 2005 SURGERY AND CHEMO DONE  AND CURRENTLY 4/ 14/22  RIGHT LUNG CANCER HAS ONLY RECEIVED RADIATION THUS FAR    Chronic kidney disease     stage 3    CKD (chronic kidney disease) stage 4, GFR 15-29 ml/min     Condition not found     ulcer    Congestive heart failure (CHF)     FOLLOWED BY DR AQUINO. DENIES CP BUT DOES HAVE SOA CHRONIC ISSUE COPD/LUNG CANCER    COPD (chronic obstructive pulmonary disease)     FOLLOWED BY DR MARIAJOSE BAILEY    Coronary artery disease     DENIES CP BUT DOES GET SOA MOST OF THE TIME WITH EXERTION BUT OCC AT REST CHRONIC ISSUE COPD/LUNG CANCER    Deep vein thrombosis     Diabetes mellitus     DOES NOT CHECK BS DAILY    Disease of thyroid gland     HYPOTHYROIDISM    Essential hypertension     Gastric ulcer     GERD (gastroesophageal reflux disease)      Heart murmur     History of transfusion     NO ISSUES POST TRANSFUSION WAS MANY YEARS AGO    Hyperlipemia     Leukocytopenia     FOLLOWED BY DR MIR BAILEY    Limb swelling     Lumbago     Lumbar spinal stenosis     Lung cancer     Migraine headache     Multiple joint pain     On home oxygen therapy     3L/NC PRN    Osteopenia     Reflux esophagitis     Shortness of breath     Thyroid nodule     HAS ULTRASOUND YEARLY BEING MONITORED    Vascular disease     Vitamin D deficiency      Past Surgical History:   Procedure Laterality Date    ABDOMINAL SURGERY      APPENDECTOMY      ARTERIOVENOUS FISTULA/SHUNT SURGERY Left 05/10/2022    Procedure: Left basilic vein transposition;  Surgeon: Wan Barksdale MD;  Location: [facility] MAIN OR;  Service: Vascular;  Laterality: Left;    ARTERIOVENOUS FISTULA/SHUNT SURGERY Left 3/23/2023    Procedure: Ligation of left arm arteriovenous fistula;  Surgeon: Wan Barksdale MD;  Location: [facility] MAIN OR;  Service: Vascular;  Laterality: Left;    CARDIAC CATHETERIZATION  1996    CARDIAC SURGERY      CARDIAC SURGERY      fluid drained from heart    CATARACT EXTRACTION, BILATERAL  2003    COLONOSCOPY  2014    ENDOSCOPY  2016 2019    FEMORAL ARTERY STENT Bilateral     HYSTERECTOMY      LUNG BIOPSY Left 2005    lobectomy upper lung caner    LUNG VOLUME REDUCTION      OTHER SURGICAL HISTORY      artifical joints/limbs    REPLACEMENT TOTAL KNEE Left 2016    UPPER GASTROINTESTINAL ENDOSCOPY       Family History   Problem Relation Age of Onset    Arthritis Mother     Arthritis Father     Cancer Brother     Diabetes Brother     Other Brother         blood disease     Prostate cancer Brother     Cancer Brother     Prostate cancer Brother     Cancer Brother     Cancer Brother     Malig Hyperthermia Neg Hx     Colon cancer Neg Hx        Home Medications:  Prior to Admission medications    Medication Sig Start Date End Date Taking? Authorizing Provider   traMADol (ULTRAM) 50 MG tablet Take 1 tablet by  mouth. 11/22/23  Yes Sarah Parker MD   albuterol sulfate  (90 Base) MCG/ACT inhaler Inhale 2 puffs Every 4 (Four) Hours As Needed for Wheezing.    Sarah Parker MD   amLODIPine (NORVASC) 10 MG tablet Take 1 tablet by mouth Daily. 12/13/22   León Sanchez MD   aspirin 81 MG chewable tablet Chew 1 tablet Daily.    Sarah Parker MD   carvedilol (Coreg) 25 MG tablet Take 1 tablet by mouth 2 (Two) Times a Day With Meals for 30 days. 11/5/23 12/5/23  Patrick Turcios MD   cilostazol (PLETAL) 100 MG tablet Take 1 tablet by mouth 2 (two) times a day. 4/17/21   Sarah Parker MD   dexlansoprazole (DEXILANT) 60 MG capsule Take 1 capsule by mouth Daily. 9/23/22   Sarah Parker MD   ergocalciferol (ERGOCALCIFEROL) 1.25 MG (83495 UT) capsule Take 1 capsule by mouth Every 14 (Fourteen) Days. 3/4/22   Emergency, Nurse Epic, RN   furosemide (Lasix) 80 MG tablet Take 1 tablet by mouth Daily for 30 days. 11/14/23 12/14/23  Rafael Lyons MD   hydrALAZINE (APRESOLINE) 25 MG tablet Take 1 tablet by mouth 2 (Two) Times a Day for 30 days. 11/5/23 12/5/23  Patrick Turcios MD   levocetirizine (XYZAL) 5 MG tablet TAKE 1 TABLET DAILY 6/23/23   Paloma James MD   linagliptin (Tradjenta) 5 MG tablet tablet Take 1 tablet by mouth Daily. 1/10/22   Paloma James MD   nitroglycerin (NITROSTAT) 0.4 MG SL tablet Place 1 tablet under the tongue Every 5 (Five) Minutes As Needed for Chest Pain.    Sarah Parker MD   polycarbophil (calcium polycarbophil) 625 MG tablet tablet Take 1 tablet by mouth Daily.    Sarah Parker MD   rosuvastatin (CRESTOR) 20 MG tablet Take 1 tablet by mouth Daily. 7/7/23   León Sanchez MD   sennosides-docusate (Senokot S) 8.6-50 MG per tablet Take 1 tablet by mouth Every Night for 30 days. 11/14/23 12/14/23  Rafael Lyons MD   sevelamer (RENVELA) 800 MG tablet Take 1 tablet by mouth 3 (Three) Times a Day With Meals. 10/9/23   Provider,  "MD Sarah   Symbicort 160-4.5 MCG/ACT inhaler Inhale 2 puffs 2 (two) times a day. 21   Provider, MD Sarah        Social History:   Social History     Tobacco Use    Smoking status: Former     Packs/day: 1.00     Years: 30.00     Additional pack years: 0.00     Total pack years: 30.00     Types: Cigarettes     Start date:      Quit date: 2004     Years since quittin.4    Smokeless tobacco: Never   Vaping Use    Vaping Use: Never used   Substance Use Topics    Alcohol use: Never    Drug use: Never         Review of Systems:  Review of Systems   Constitutional:  Negative for chills and fever.   HENT:  Negative for congestion, ear pain and sore throat.    Eyes:  Negative for pain.   Respiratory:  Positive for shortness of breath. Negative for cough and chest tightness.    Cardiovascular:  Negative for chest pain.   Gastrointestinal:  Negative for abdominal pain, diarrhea, nausea and vomiting.   Genitourinary:  Negative for flank pain and hematuria.   Musculoskeletal:  Negative for joint swelling.   Skin:  Negative for pallor.   Neurological:  Negative for seizures and headaches.   All other systems reviewed and are negative.       Physical Exam:  /51 (BP Location: Right arm, Patient Position: Lying)   Pulse 104   Temp 98.1 °F (36.7 °C) (Oral)   Resp 22   Ht 165.1 cm (65\")   Wt 75.3 kg (166 lb 0.1 oz)   LMP  (LMP Unknown)   SpO2 94%   BMI 27.62 kg/m²     Physical Exam  Vitals and nursing note reviewed.   Constitutional:       General: She is not in acute distress.     Appearance: Normal appearance. She is not toxic-appearing.   HENT:      Head: Normocephalic and atraumatic.      Mouth/Throat:      Mouth: Mucous membranes are moist.   Eyes:      General: No scleral icterus.  Cardiovascular:      Rate and Rhythm: Normal rate and regular rhythm.      Pulses: Normal pulses.      Heart sounds: Normal heart sounds.   Pulmonary:      Effort: Pulmonary effort is normal. No respiratory " distress.      Breath sounds: Normal breath sounds. Examination of the right-middle field reveals wheezing. Examination of the left-middle field reveals wheezing. Wheezing present.   Abdominal:      General: Abdomen is flat.      Palpations: Abdomen is soft.      Tenderness: There is no abdominal tenderness.   Musculoskeletal:         General: Normal range of motion.      Cervical back: Normal range of motion and neck supple.   Skin:     General: Skin is warm and dry.      Capillary Refill: Capillary refill takes less than 2 seconds.   Neurological:      General: No focal deficit present.      Mental Status: She is alert and oriented to person, place, and time. Mental status is at baseline.   Psychiatric:         Mood and Affect: Mood normal.         Behavior: Behavior normal.                  Procedures:  Procedures      Medical Decision Making:      Comorbidities that affect care:    Chronic Kidney Disease, COPD    External Notes reviewed:    Previous Admission Note: Admission for hypoxia and COPD exacerbation      The following orders were placed and all results were independently analyzed by me:  Orders Placed This Encounter   Procedures    XR Chest 1 View    [facility] Draw    Comprehensive Metabolic Panel    BNP    Single High Sensitivity Troponin T    CBC Auto Differential    High Sensitivity Troponin T 2Hr    Basic Metabolic Panel    ABG with Co-Ox and Electrolytes    CBC Auto Differential    Diet: Cardiac Diets, Diabetic Diets; Healthy Heart (2-3 Na+); Consistent Carbohydrate; Texture: Regular Texture (IDDSI 7); Fluid Consistency: Thin (IDDSI 0)    Undress & Gown    Continuous Pulse Oximetry    Vital Signs    Check Pulse Oximetry while ambulating    Vital Signs    Activity - Ad Tania    Intake & Output    Weigh Patient    Oral Care    Saline Lock & Maintain IV Access    Code Status and Medical Interventions:    IP General Consult (Use specialty-specific consult if known)    Inpatient Case Management Social  Services Consult    Inpatient Spiritual Care Consult    OT Consult: Eval & Treat    PT Consult: Eval & Treat Functional Mobility Below Baseline    Oxygen Therapy- Nasal Cannula; Titrate 1-6 LPM Per SpO2; 90 - 95%    ECG 12 Lead ED Triage Standing Order; SOA    Insert Peripheral IV    Insert Peripheral IV    Inpatient Admission    CBC & Differential    Green Top (Gel)    Lavender Top    Gold Top - SST    Light Blue Top    CBC & Differential       Medications Given in the Emergency Department:  Medications   sodium chloride 0.9 % flush 10 mL (has no administration in time range)   carvedilol (COREG) tablet 25 mg (25 mg Oral Given 11/26/23 0908)   hydrALAZINE (APRESOLINE) tablet 25 mg (25 mg Oral Given 11/26/23 0908)   linagliptin (TRADJENTA) tablet 5 mg (5 mg Oral Given 11/26/23 0908)   sennosides-docusate (PERICOLACE) 8.6-50 MG per tablet 1 tablet (1 tablet Oral Not Given 11/25/23 2154)   rosuvastatin (CRESTOR) tablet 20 mg (20 mg Oral Given 11/25/23 2156)   sevelamer (RENVELA) tablet 800 mg (800 mg Oral Given 11/26/23 0908)   traMADol (ULTRAM) tablet 50 mg (50 mg Oral Given 11/25/23 2156)   sodium chloride 0.9 % flush 10 mL (10 mL Intravenous Given 11/25/23 2156)   sodium chloride 0.9 % flush 10 mL (has no administration in time range)   sodium chloride 0.9 % infusion 40 mL (has no administration in time range)   acetaminophen (TYLENOL) tablet 650 mg (has no administration in time range)     Or   acetaminophen (TYLENOL) 160 MG/5ML oral solution 650 mg (has no administration in time range)     Or   acetaminophen (TYLENOL) suppository 650 mg (has no administration in time range)   HYDROmorphone (DILAUDID) injection 0.5 mg (has no administration in time range)     And   naloxone (NARCAN) injection 0.4 mg (has no administration in time range)   ondansetron (ZOFRAN) injection 4 mg (4 mg Intravenous Given 11/26/23 0416)   Pharmacy to Dose enoxaparin (LOVENOX) (has no administration in time range)   ipratropium-albuterol  (DUO-NEB) nebulizer solution 3 mL (3 mL Nebulization Given 11/26/23 0717)   methylPREDNISolone sodium succinate (SOLU-Medrol) injection 40 mg (40 mg Intravenous Given 11/26/23 0555)   Enoxaparin Sodium (LOVENOX) syringe 30 mg (30 mg Subcutaneous Given 11/25/23 2156)   pantoprazole (PROTONIX) EC tablet 40 mg (40 mg Oral Given 11/26/23 0709)   cetirizine (zyrTEC) tablet 10 mg (10 mg Oral Given 11/26/23 0908)   cilostazol (PLETAL) tablet 100 mg (100 mg Oral Given 11/26/23 0908)   methylPREDNISolone sodium succinate (SOLU-Medrol) injection 125 mg (125 mg Intravenous Given 11/25/23 1435)   ipratropium-albuterol (DUO-NEB) nebulizer solution 3 mL (3 mL Nebulization Given 11/25/23 1449)        ED Course:         EKG: Sinus tachycardia with a rate of 101 bpm  Normal P wave with first-degree AV block  Normal QRS and normal axis  Nonspecific ST change  No acute ischemia.    Labs:    Lab Results (last 24 hours)       Procedure Component Value Units Date/Time    CBC & Differential [[id number]]  (Abnormal) Collected: 11/25/23 1313    Specimen: Blood Updated: 11/25/23 1319    Narrative:      The following orders were created for panel order CBC & Differential.  Procedure                               Abnormality         Status                     ---------                               -----------         ------                     CBC Auto Differential[[id number]]        Abnormal            Final result                 Please view results for these tests on the individual orders.    Comprehensive Metabolic Panel [[id number]]  (Abnormal) Collected: 11/25/23 1313    Specimen: Blood Updated: 11/25/23 1338     Glucose 204 mg/dL      BUN 80 mg/dL      Creatinine 2.92 mg/dL      Sodium 140 mmol/L      Potassium 5.3 mmol/L      Chloride 107 mmol/L      CO2 20.0 mmol/L      Calcium 8.4 mg/dL      Total Protein 6.6 g/dL      Albumin 3.5 g/dL      ALT (SGPT) 9 U/L      AST (SGOT) 11 U/L      Alkaline Phosphatase 183 U/L      Total Bilirubin  0.2 mg/dL      Globulin 3.1 gm/dL      A/G Ratio 1.1 g/dL      BUN/Creatinine Ratio 27.4     Anion Gap 13.0 mmol/L      eGFR 15.2 mL/min/1.73     Narrative:      GFR Normal >60  Chronic Kidney Disease <60  Kidney Failure <15    The GFR formula is only valid for adults with stable renal function between ages 18 and 70.    BNP [[id number]]  (Abnormal) Collected: 11/25/23 1313    Specimen: Blood Updated: 11/25/23 1336     proBNP 7,806.0 pg/mL     Narrative:      This assay is used as an aid in the diagnosis of individuals suspected of having heart failure. It can be used as an aid in the diagnosis of acute decompensated heart failure (ADHF) in patients presenting with signs and symptoms of ADHF to the emergency department (ED). In addition, NT-proBNP of <300 pg/mL indicates ADHF is not likely.    Age Range Result Interpretation  NT-proBNP Concentration (pg/mL:      <50             Positive            >450                   Gray                 300-450                    Negative             <300    50-75           Positive            >900                  Gray                300-900                  Negative            <300      >75             Positive            >1800                  Gray                300-1800                  Negative            <300    Single High Sensitivity Troponin T [[id number]]  (Abnormal) Collected: 11/25/23 1313    Specimen: Blood Updated: 11/25/23 1351     HS Troponin T 83 ng/L     Narrative:      High Sensitive Troponin T Reference Range:  <14.0 ng/L- Negative Female for AMI  <22.0 ng/L- Negative Male for AMI  >=14 - Abnormal Female indicating possible myocardial injury.  >=22 - Abnormal Male indicating possible myocardial injury.   Clinicians would have to utilize clinical acumen, EKG, Troponin, and serial changes to determine if it is an Acute Myocardial Infarction or myocardial injury due to an underlying chronic condition.         CBC Auto Differential [[id number]]  (Abnormal)  Collected: 11/25/23 1313    Specimen: Blood Updated: 11/25/23 1319     WBC 9.50 10*3/mm3      RBC 2.72 10*6/mm3      Hemoglobin 8.1 g/dL      Hematocrit 26.9 %      MCV 98.9 fL      MCH 29.8 pg      MCHC 30.1 g/dL      RDW 13.3 %      RDW-SD 47.8 fl      MPV 9.6 fL      Platelets 191 10*3/mm3      Neutrophil % 84.6 %      Lymphocyte % 4.5 %      Monocyte % 9.7 %      Eosinophil % 0.4 %      Basophil % 0.3 %      Immature Grans % 0.5 %      Neutrophils, Absolute 8.03 10*3/mm3      Lymphocytes, Absolute 0.43 10*3/mm3      Monocytes, Absolute 0.92 10*3/mm3      Eosinophils, Absolute 0.04 10*3/mm3      Basophils, Absolute 0.03 10*3/mm3      Immature Grans, Absolute 0.05 10*3/mm3      nRBC 0.0 /100 WBC     High Sensitivity Troponin T 2Hr [[id number]]  (Abnormal) Collected: 11/25/23 1541    Specimen: Blood Updated: 11/25/23 1652     HS Troponin T 81 ng/L      Troponin T Delta -2 ng/L     Narrative:      High Sensitive Troponin T Reference Range:  <14.0 ng/L- Negative Female for AMI  <22.0 ng/L- Negative Male for AMI  >=14 - Abnormal Female indicating possible myocardial injury.  >=22 - Abnormal Male indicating possible myocardial injury.   Clinicians would have to utilize clinical acumen, EKG, Troponin, and serial changes to determine if it is an Acute Myocardial Infarction or myocardial injury due to an underlying chronic condition.         ABG with Co-Ox and Electrolytes [[id number]]  (Abnormal) Collected: 11/25/23 2154    Specimen: Arterial Blood from Arm, Right Updated: 11/25/23 2157     pH, Arterial 7.325 pH units      pCO2, Arterial 38.8 mm Hg      pO2, Arterial 71.5 mm Hg      HCO3, Arterial 19.8 mmol/L      Base Excess, Arterial -5.7 mmol/L      O2 Saturation, Arterial 93.5 %      Hemoglobin, Blood Gas 9.5 g/dL      Carboxyhemoglobin 0.0 %      Methemoglobin 0.20 %      Oxyhemoglobin 93.3 %      FHHB 6.5 %      Home's Test Positive     Note --     Site Arterial: right radial     Modality Nasal Cannula     FIO2 32 %       Flow Rate 3 lpm      Sodium, Arterial 139.4 mmol/L      Potassium, Arterial 5.72 mmol/L      Ionized Calcium, Arterial 1.14 mmol/L      Chloride, Arterial 106 mmol/L      Glucose, Arterial 173 mg/dL      Lactate, Arterial 0.74 mmol/L      PO2/FIO2 223    Basic Metabolic Panel [[id number]]  (Abnormal) Collected: 11/26/23 0340    Specimen: Blood Updated: 11/26/23 0415     Glucose 175 mg/dL      BUN 88 mg/dL      Creatinine 2.80 mg/dL      Sodium 139 mmol/L      Potassium 5.6 mmol/L      Chloride 107 mmol/L      CO2 18.1 mmol/L      Calcium 8.3 mg/dL      BUN/Creatinine Ratio 31.4     Anion Gap 13.9 mmol/L      eGFR 16.0 mL/min/1.73     Narrative:      GFR Normal >60  Chronic Kidney Disease <60  Kidney Failure <15    The GFR formula is only valid for adults with stable renal function between ages 18 and 70.    CBC & Differential [[id number]]  (Abnormal) Collected: 11/26/23 0340    Specimen: Blood Updated: 11/26/23 0352    Narrative:      The following orders were created for panel order CBC & Differential.  Procedure                               Abnormality         Status                     ---------                               -----------         ------                     CBC Auto Differential[[id number]]        Abnormal            Final result                 Please view results for these tests on the individual orders.    CBC Auto Differential [[id number]]  (Abnormal) Collected: 11/26/23 0340    Specimen: Blood Updated: 11/26/23 0352     WBC 4.27 10*3/mm3      RBC 2.64 10*6/mm3      Hemoglobin 7.8 g/dL      Hematocrit 25.8 %      MCV 97.7 fL      MCH 29.5 pg      MCHC 30.2 g/dL      RDW 13.2 %      RDW-SD 47.2 fl      MPV 9.5 fL      Platelets 194 10*3/mm3      Neutrophil % 92.5 %      Lymphocyte % 5.2 %      Monocyte % 1.4 %      Eosinophil % 0.0 %      Basophil % 0.2 %      Immature Grans % 0.7 %      Neutrophils, Absolute 3.95 10*3/mm3      Lymphocytes, Absolute 0.22 10*3/mm3      Monocytes, Absolute 0.06  10*3/mm3      Eosinophils, Absolute 0.00 10*3/mm3      Basophils, Absolute 0.01 10*3/mm3      Immature Grans, Absolute 0.03 10*3/mm3      nRBC 0.0 /100 WBC              Imaging:    XR Chest 1 View    Result Date: 11/25/2023  PROCEDURE: XR CHEST 1 VW  COMPARISON: [facility], CR, XR CHEST 2 VW, 1/11/2023, 12:43.  [facility], CT, CT CHEST WO CONTRAST DIAGNOSTIC, 3/01/2023, 15:47.  [facility], CR, XR CHEST 1 VW, 10/29/2023, 5:24.  [facility], CR, XR CHEST 1 VW, 11/09/2023, 4:37.  INDICATIONS: SHORTNESS OF BREATH  FINDINGS:   The heart and pulmonary vasculature are within normal limits.  Stable irregular consolidation inferiorly in the right upper lobe likely treatment related change.  There are stable appearing postoperative changes in the left hemithorax with volume loss and pleural calcification.  No evidence of pneumothorax.  IMPRESSION: No active disease.   YARELIS LUCAS MD       Electronically Signed and Approved By: YARELIS LUCAS MD on 11/25/2023 at 13:22                Differential Diagnosis and Discussion:    Dyspnea: Differential diagnosis includes but is not limited to metabolic acidosis, neurological disorders, psychogenic, asthma, pneumothorax, upper airway obstruction, COPD, pneumonia, noncardiogenic pulmonary edema, interstitial lung disease, anemia, congestive heart failure, and pulmonary embolism    All labs were reviewed and interpreted by me.  EKG was interpreted by me.    MDM  Number of Diagnoses or Management Options  Acute exacerbation of chronic obstructive pulmonary disease (COPD)  Hypoxia  Diagnosis management comments: The patient ambulated in the emergency department however she was able to take several steps.  She became hypoxic on her pulse oximeter of 87%.  She states that she is at home and ambulate any further subsequently she will be admitted to the hospital for further evaluation and treatment.       Amount  and/or Complexity of Data Reviewed  Clinical lab tests: reviewed  Tests in the radiology section of CPT®: reviewed  Tests in the medicine section of CPT®: reviewed                 Patient Care Considerations:    CT CHEST: I considered ordering a CT scan of the chest, however the patient had recent admission for similar symptoms as multiple radiographic studies performed.      Consultants/Shared Management Plan:    Consultant: I have discussed the case with Dr. Lyons who states the patient can be admitted to the hospital..    Social Determinants of Health:    Patient has presented with family members who are responsible, reliable and will ensure follow up care.      Disposition and Care Coordination:    Discharged: The patient is suitable and stable for discharge with no need for consideration of observation or admission.    I have explained discharge medications and the need for follow up with the patient/caretakers. This was also printed in the discharge instructions. Patient was discharged with the following medications and follow up:      Medication List        Changed      levocetirizine 5 MG tablet  Commonly known as: XYZAL  TAKE 1 TABLET DAILY  What changed: when to take this           No follow-up provider specified.     Final diagnoses:   Acute exacerbation of chronic obstructive pulmonary disease (COPD)   Hypoxia        ED Disposition       ED Disposition   Decision to Admit    Condition   --    Comment   Level of Care: Med/Surg [1]   Diagnosis: COPD exacerbation [922560]   Admitting Physician: TAWANNA LYONS [[id number]]   Attending Physician: TAWANNA LYONS [[id number]]   Isolate for COVID?: No [0]   Certification: I Certify That Inpatient Hospital Services Are Medically Necessary For Greater Than 2 Midnights                 This medical record created using voice recognition software.             Jose Manuel Saunders,   11/26/23 0912

## 2023-11-26 LAB
ANION GAP SERPL CALCULATED.3IONS-SCNC: 13.9 MMOL/L (ref 5–15)
BASOPHILS # BLD AUTO: 0.01 10*3/MM3 (ref 0–0.2)
BASOPHILS NFR BLD AUTO: 0.2 % (ref 0–1.5)
BUN SERPL-MCNC: 88 MG/DL (ref 8–23)
BUN/CREAT SERPL: 31.4 (ref 7–25)
CALCIUM SPEC-SCNC: 8.3 MG/DL (ref 8.6–10.5)
CHLORIDE SERPL-SCNC: 107 MMOL/L (ref 98–107)
CO2 SERPL-SCNC: 18.1 MMOL/L (ref 22–29)
CREAT SERPL-MCNC: 2.8 MG/DL (ref 0.57–1)
DEPRECATED RDW RBC AUTO: 47.2 FL (ref 37–54)
EGFRCR SERPLBLD CKD-EPI 2021: 16 ML/MIN/1.73
EOSINOPHIL # BLD AUTO: 0 10*3/MM3 (ref 0–0.4)
EOSINOPHIL NFR BLD AUTO: 0 % (ref 0.3–6.2)
ERYTHROCYTE [DISTWIDTH] IN BLOOD BY AUTOMATED COUNT: 13.2 % (ref 12.3–15.4)
GLUCOSE SERPL-MCNC: 175 MG/DL (ref 65–99)
HCT VFR BLD AUTO: 25.8 % (ref 34–46.6)
HGB BLD-MCNC: 7.8 G/DL (ref 12–15.9)
IMM GRANULOCYTES # BLD AUTO: 0.03 10*3/MM3 (ref 0–0.05)
IMM GRANULOCYTES NFR BLD AUTO: 0.7 % (ref 0–0.5)
LYMPHOCYTES # BLD AUTO: 0.22 10*3/MM3 (ref 0.7–3.1)
LYMPHOCYTES NFR BLD AUTO: 5.2 % (ref 19.6–45.3)
MCH RBC QN AUTO: 29.5 PG (ref 26.6–33)
MCHC RBC AUTO-ENTMCNC: 30.2 G/DL (ref 31.5–35.7)
MCV RBC AUTO: 97.7 FL (ref 79–97)
MONOCYTES # BLD AUTO: 0.06 10*3/MM3 (ref 0.1–0.9)
MONOCYTES NFR BLD AUTO: 1.4 % (ref 5–12)
NEUTROPHILS NFR BLD AUTO: 3.95 10*3/MM3 (ref 1.7–7)
NEUTROPHILS NFR BLD AUTO: 92.5 % (ref 42.7–76)
NRBC BLD AUTO-RTO: 0 /100 WBC (ref 0–0.2)
PLATELET # BLD AUTO: 194 10*3/MM3 (ref 140–450)
PMV BLD AUTO: 9.5 FL (ref 6–12)
POTASSIUM SERPL-SCNC: 5.6 MMOL/L (ref 3.5–5.2)
RBC # BLD AUTO: 2.64 10*6/MM3 (ref 3.77–5.28)
SODIUM SERPL-SCNC: 139 MMOL/L (ref 136–145)
WBC NRBC COR # BLD AUTO: 4.27 10*3/MM3 (ref 3.4–10.8)

## 2023-11-26 PROCEDURE — 25010000002 ONDANSETRON PER 1 MG: Performed by: INTERNAL MEDICINE

## 2023-11-26 PROCEDURE — 94799 UNLISTED PULMONARY SVC/PX: CPT

## 2023-11-26 PROCEDURE — 94664 DEMO&/EVAL PT USE INHALER: CPT

## 2023-11-26 PROCEDURE — 25010000002 FUROSEMIDE PER 20 MG: Performed by: INTERNAL MEDICINE

## 2023-11-26 PROCEDURE — 25010000002 METHYLPREDNISOLONE PER 40 MG: Performed by: INTERNAL MEDICINE

## 2023-11-26 PROCEDURE — 80048 BASIC METABOLIC PNL TOTAL CA: CPT | Performed by: INTERNAL MEDICINE

## 2023-11-26 PROCEDURE — 85025 COMPLETE CBC W/AUTO DIFF WBC: CPT | Performed by: INTERNAL MEDICINE

## 2023-11-26 RX ORDER — FUROSEMIDE 10 MG/ML
20 INJECTION INTRAMUSCULAR; INTRAVENOUS EVERY 12 HOURS
Status: DISCONTINUED | OUTPATIENT
Start: 2023-11-26 | End: 2023-11-27

## 2023-11-26 RX ADMIN — Medication 10 ML: at 21:53

## 2023-11-26 RX ADMIN — METHYLPREDNISOLONE SODIUM SUCCINATE 40 MG: 40 INJECTION INTRAMUSCULAR; INTRAVENOUS at 17:13

## 2023-11-26 RX ADMIN — METHYLPREDNISOLONE SODIUM SUCCINATE 40 MG: 40 INJECTION INTRAMUSCULAR; INTRAVENOUS at 21:52

## 2023-11-26 RX ADMIN — CILOSTAZOL 100 MG: 100 TABLET ORAL at 21:52

## 2023-11-26 RX ADMIN — CARVEDILOL 25 MG: 12.5 TABLET, FILM COATED ORAL at 09:08

## 2023-11-26 RX ADMIN — Medication 10 ML: at 09:00

## 2023-11-26 RX ADMIN — FUROSEMIDE 20 MG: 10 INJECTION, SOLUTION INTRAMUSCULAR; INTRAVENOUS at 12:59

## 2023-11-26 RX ADMIN — SEVELAMER CARBONATE 800 MG: 800 TABLET, FILM COATED ORAL at 09:08

## 2023-11-26 RX ADMIN — SEVELAMER CARBONATE 800 MG: 800 TABLET, FILM COATED ORAL at 12:59

## 2023-11-26 RX ADMIN — CARVEDILOL 25 MG: 12.5 TABLET, FILM COATED ORAL at 17:13

## 2023-11-26 RX ADMIN — IPRATROPIUM BROMIDE AND ALBUTEROL SULFATE 3 ML: .5; 3 SOLUTION RESPIRATORY (INHALATION) at 19:44

## 2023-11-26 RX ADMIN — IPRATROPIUM BROMIDE AND ALBUTEROL SULFATE 3 ML: .5; 3 SOLUTION RESPIRATORY (INHALATION) at 07:17

## 2023-11-26 RX ADMIN — IPRATROPIUM BROMIDE AND ALBUTEROL SULFATE 3 ML: .5; 3 SOLUTION RESPIRATORY (INHALATION) at 10:52

## 2023-11-26 RX ADMIN — IPRATROPIUM BROMIDE AND ALBUTEROL SULFATE 3 ML: .5; 3 SOLUTION RESPIRATORY (INHALATION) at 03:01

## 2023-11-26 RX ADMIN — METHYLPREDNISOLONE SODIUM SUCCINATE 40 MG: 40 INJECTION INTRAMUSCULAR; INTRAVENOUS at 05:55

## 2023-11-26 RX ADMIN — HYDRALAZINE HYDROCHLORIDE 25 MG: 25 TABLET, FILM COATED ORAL at 22:00

## 2023-11-26 RX ADMIN — IPRATROPIUM BROMIDE AND ALBUTEROL SULFATE 3 ML: .5; 3 SOLUTION RESPIRATORY (INHALATION) at 00:05

## 2023-11-26 RX ADMIN — CILOSTAZOL 100 MG: 100 TABLET ORAL at 09:08

## 2023-11-26 RX ADMIN — HYDRALAZINE HYDROCHLORIDE 25 MG: 25 TABLET, FILM COATED ORAL at 09:08

## 2023-11-26 RX ADMIN — SEVELAMER CARBONATE 800 MG: 800 TABLET, FILM COATED ORAL at 17:13

## 2023-11-26 RX ADMIN — IPRATROPIUM BROMIDE AND ALBUTEROL SULFATE 3 ML: .5; 3 SOLUTION RESPIRATORY (INHALATION) at 16:04

## 2023-11-26 RX ADMIN — PANTOPRAZOLE SODIUM 40 MG: 40 TABLET, DELAYED RELEASE ORAL at 07:09

## 2023-11-26 RX ADMIN — CETIRIZINE HYDROCHLORIDE 10 MG: 10 TABLET, FILM COATED ORAL at 09:08

## 2023-11-26 RX ADMIN — ROSUVASTATIN 20 MG: 20 TABLET, FILM COATED ORAL at 21:52

## 2023-11-26 RX ADMIN — LINAGLIPTIN 5 MG: 5 TABLET, FILM COATED ORAL at 09:08

## 2023-11-26 RX ADMIN — ONDANSETRON 4 MG: 2 INJECTION INTRAMUSCULAR; INTRAVENOUS at 04:16

## 2023-11-26 RX ADMIN — PANTOPRAZOLE SODIUM 40 MG: 40 TABLET, DELAYED RELEASE ORAL at 17:13

## 2023-11-26 RX ADMIN — TRAMADOL HYDROCHLORIDE 50 MG: 50 TABLET ORAL at 23:08

## 2023-11-26 NOTE — PROGRESS NOTES
[facility]     Progress Note    Patient Name: Charlette Rai  : 1935  MRN: [id number]  Primary Care Physician:  Rafael Lyons MD  Date of admission: 2023      Subjective   Brief summary.  Admitted with hypoxia and shortness of breath      HPI:  Feeling better but sleepy.  No chest pain, oxygenation better.  No wheezing, increased swelling of legs and arms    Review of Systems     Denies any chest pain, increased swelling.  Shortness of breath.  Fatigue      Objective     Vitals:   Temp:  [97.7 °F (36.5 °C)-98.3 °F (36.8 °C)] 98.3 °F (36.8 °C)  Heart Rate:  [] 103  Resp:  [16-22] 22  BP: (111-144)/(43-82) 139/47  Flow (L/min):  [2.5-3] 3    Physical Exam :     Elderly female not in acute distress.  Heart regular.  Lungs diminished breath sounds with few rhonchi.  Abdomen soft.  Extremities with edema      Result Review:  I have personally reviewed the results from the time of this admission to 2023 12:07 EST and agree with these findings:  [x]  Laboratory  []  Microbiology  []  Radiology  []  EKG/Telemetry   []  Cardiology/Vascular   []  Pathology  []  Old records  []  Other:           Assessment / Plan       Active Hospital Problems:  Active Hospital Problems    Diagnosis     COPD exacerbation     Hypoxia     Acute exacerbation of CHF (congestive heart failure)     Essential hypertension     GERD (gastroesophageal reflux disease)     Stage 4 chronic kidney disease     Type 2 diabetes mellitus without complication        Plan:   Continue diuresis, decrease steroids, nephrology consult.  PT OT eval.  Further management will based on clinical course, lab results and consultant input       DVT prophylaxis:  Medical DVT prophylaxis orders are present.    CODE STATUS:   Code Status (Patient has no pulse and is not breathing): CPR (Attempt to Resuscitate)  Medical Interventions (Patient has pulse or is breathing): Full Support            Electronically signed by Rafael Lyons MD, 23, 12:07  PM EST.

## 2023-11-27 ENCOUNTER — APPOINTMENT (OUTPATIENT)
Dept: GENERAL RADIOLOGY | Facility: HOSPITAL | Age: 88
DRG: 981 | End: 2023-11-27
Payer: MEDICARE

## 2023-11-27 PROBLEM — J96.01 ACUTE HYPOXIC RESPIRATORY FAILURE: Status: ACTIVE | Noted: 2023-10-29

## 2023-11-27 LAB
ALBUMIN SERPL-MCNC: 3.2 G/DL (ref 3.5–5.2)
ALBUMIN/GLOB SERPL: 1.1 G/DL
ALP SERPL-CCNC: 157 U/L (ref 39–117)
ALT SERPL W P-5'-P-CCNC: 9 U/L (ref 1–33)
ANION GAP SERPL CALCULATED.3IONS-SCNC: 14.1 MMOL/L (ref 5–15)
ARTERIAL PATENCY WRIST A: POSITIVE
AST SERPL-CCNC: 9 U/L (ref 1–32)
BASE EXCESS BLDA CALC-SCNC: -6.1 MMOL/L (ref -2–2)
BASOPHILS # BLD AUTO: 0 10*3/MM3 (ref 0–0.2)
BASOPHILS NFR BLD AUTO: 0 % (ref 0–1.5)
BDY SITE: ABNORMAL
BILIRUB SERPL-MCNC: <0.2 MG/DL (ref 0–1.2)
BUN SERPL-MCNC: 99 MG/DL (ref 8–23)
BUN/CREAT SERPL: 30.8 (ref 7–25)
CA-I BLDA-SCNC: 1.06 MMOL/L (ref 1.13–1.32)
CALCIUM SPEC-SCNC: 7.8 MG/DL (ref 8.6–10.5)
CHLORIDE BLDA-SCNC: 104 MMOL/L (ref 98–106)
CHLORIDE SERPL-SCNC: 103 MMOL/L (ref 98–107)
CO2 SERPL-SCNC: 18.9 MMOL/L (ref 22–29)
COHGB MFR BLD: 0.3 % (ref 0–1.5)
CREAT SERPL-MCNC: 3.21 MG/DL (ref 0.57–1)
DEPRECATED RDW RBC AUTO: 48.7 FL (ref 37–54)
EGFRCR SERPLBLD CKD-EPI 2021: 13.6 ML/MIN/1.73
EOSINOPHIL # BLD AUTO: 0 10*3/MM3 (ref 0–0.4)
EOSINOPHIL NFR BLD AUTO: 0 % (ref 0.3–6.2)
ERYTHROCYTE [DISTWIDTH] IN BLOOD BY AUTOMATED COUNT: 13.2 % (ref 12.3–15.4)
FHHB: 5.9 % (ref 0–5)
GAS FLOW AIRWAY: 8 LPM
GLOBULIN UR ELPH-MCNC: 2.9 GM/DL
GLUCOSE BLDA-MCNC: 175 MG/DL (ref 65–99)
GLUCOSE SERPL-MCNC: 210 MG/DL (ref 65–99)
HCO3 BLDA-SCNC: 20.3 MMOL/L (ref 22–26)
HCT VFR BLD AUTO: 25.8 % (ref 34–46.6)
HGB BLD-MCNC: 7.5 G/DL (ref 12–15.9)
HGB BLDA-MCNC: 10 G/DL (ref 11.7–14.6)
IMM GRANULOCYTES # BLD AUTO: 0.05 10*3/MM3 (ref 0–0.05)
IMM GRANULOCYTES NFR BLD AUTO: 0.9 % (ref 0–0.5)
LACTATE BLDA-SCNC: 1.55 MMOL/L (ref 0.5–2)
LYMPHOCYTES # BLD AUTO: 0.26 10*3/MM3 (ref 0.7–3.1)
LYMPHOCYTES NFR BLD AUTO: 4.6 % (ref 19.6–45.3)
MCH RBC QN AUTO: 29.2 PG (ref 26.6–33)
MCHC RBC AUTO-ENTMCNC: 29.1 G/DL (ref 31.5–35.7)
MCV RBC AUTO: 100.4 FL (ref 79–97)
METHGB BLD QL: 0 % (ref 0–1.5)
MODALITY: ABNORMAL
MONOCYTES # BLD AUTO: 0.25 10*3/MM3 (ref 0.1–0.9)
MONOCYTES NFR BLD AUTO: 4.4 % (ref 5–12)
NEUTROPHILS NFR BLD AUTO: 5.07 10*3/MM3 (ref 1.7–7)
NEUTROPHILS NFR BLD AUTO: 90.1 % (ref 42.7–76)
NOTE: ABNORMAL
NRBC BLD AUTO-RTO: 0 /100 WBC (ref 0–0.2)
OXYHGB MFR BLDV: 93.8 % (ref 94–99)
PCO2 BLDA: 44.2 MM HG (ref 35–45)
PH BLDA: 7.28 PH UNITS (ref 7.35–7.45)
PLATELET # BLD AUTO: 214 10*3/MM3 (ref 140–450)
PMV BLD AUTO: 9.9 FL (ref 6–12)
PO2 BLD: ABNORMAL MM[HG]
PO2 BLDA: 76 MM HG (ref 80–100)
POTASSIUM BLDA-SCNC: 5.69 MMOL/L (ref 3.5–5)
POTASSIUM SERPL-SCNC: 5.9 MMOL/L (ref 3.5–5.2)
PROT SERPL-MCNC: 6.1 G/DL (ref 6–8.5)
RBC # BLD AUTO: 2.57 10*6/MM3 (ref 3.77–5.28)
SAO2 % BLDCOA: 94.1 % (ref 95–99)
SODIUM BLDA-SCNC: 137.6 MMOL/L (ref 136–146)
SODIUM SERPL-SCNC: 136 MMOL/L (ref 136–145)
WBC NRBC COR # BLD AUTO: 5.63 10*3/MM3 (ref 3.4–10.8)

## 2023-11-27 PROCEDURE — 25010000002 ENOXAPARIN PER 10 MG: Performed by: INTERNAL MEDICINE

## 2023-11-27 PROCEDURE — 85025 COMPLETE CBC W/AUTO DIFF WBC: CPT | Performed by: INTERNAL MEDICINE

## 2023-11-27 PROCEDURE — 71045 X-RAY EXAM CHEST 1 VIEW: CPT

## 2023-11-27 PROCEDURE — 82375 ASSAY CARBOXYHB QUANT: CPT | Performed by: INTERNAL MEDICINE

## 2023-11-27 PROCEDURE — 94799 UNLISTED PULMONARY SVC/PX: CPT

## 2023-11-27 PROCEDURE — 82805 BLOOD GASES W/O2 SATURATION: CPT | Performed by: INTERNAL MEDICINE

## 2023-11-27 PROCEDURE — 94664 DEMO&/EVAL PT USE INHALER: CPT

## 2023-11-27 PROCEDURE — 83050 HGB METHEMOGLOBIN QUAN: CPT | Performed by: INTERNAL MEDICINE

## 2023-11-27 PROCEDURE — 25010000002 METHYLPREDNISOLONE PER 40 MG: Performed by: INTERNAL MEDICINE

## 2023-11-27 PROCEDURE — 80053 COMPREHEN METABOLIC PANEL: CPT | Performed by: INTERNAL MEDICINE

## 2023-11-27 PROCEDURE — 36600 WITHDRAWAL OF ARTERIAL BLOOD: CPT | Performed by: INTERNAL MEDICINE

## 2023-11-27 PROCEDURE — 25010000002 EPOETIN ALFA PER 1000 UNITS: Performed by: INTERNAL MEDICINE

## 2023-11-27 PROCEDURE — 25010000002 FUROSEMIDE PER 20 MG: Performed by: INTERNAL MEDICINE

## 2023-11-27 RX ORDER — BUMETANIDE 0.25 MG/ML
3 INJECTION INTRAMUSCULAR; INTRAVENOUS EVERY 8 HOURS
Status: DISCONTINUED | OUTPATIENT
Start: 2023-11-27 | End: 2023-11-28

## 2023-11-27 RX ORDER — METOLAZONE 5 MG/1
10 TABLET ORAL DAILY
Status: COMPLETED | OUTPATIENT
Start: 2023-11-27 | End: 2023-11-28

## 2023-11-27 RX ORDER — SODIUM BICARBONATE 650 MG/1
650 TABLET ORAL 2 TIMES DAILY
Status: DISCONTINUED | OUTPATIENT
Start: 2023-11-27 | End: 2023-11-30

## 2023-11-27 RX ADMIN — Medication 10 ML: at 20:49

## 2023-11-27 RX ADMIN — CILOSTAZOL 100 MG: 100 TABLET ORAL at 20:49

## 2023-11-27 RX ADMIN — METHYLPREDNISOLONE SODIUM SUCCINATE 40 MG: 40 INJECTION INTRAMUSCULAR; INTRAVENOUS at 23:30

## 2023-11-27 RX ADMIN — BUMETANIDE 3 MG: 0.25 INJECTION INTRAMUSCULAR; INTRAVENOUS at 17:51

## 2023-11-27 RX ADMIN — LINAGLIPTIN 5 MG: 5 TABLET, FILM COATED ORAL at 08:28

## 2023-11-27 RX ADMIN — CILOSTAZOL 100 MG: 100 TABLET ORAL at 08:26

## 2023-11-27 RX ADMIN — SEVELAMER CARBONATE 800 MG: 800 TABLET, FILM COATED ORAL at 12:42

## 2023-11-27 RX ADMIN — METHYLPREDNISOLONE SODIUM SUCCINATE 40 MG: 40 INJECTION INTRAMUSCULAR; INTRAVENOUS at 14:44

## 2023-11-27 RX ADMIN — ENOXAPARIN SODIUM 30 MG: 100 INJECTION SUBCUTANEOUS at 08:28

## 2023-11-27 RX ADMIN — HYDRALAZINE HYDROCHLORIDE 25 MG: 25 TABLET, FILM COATED ORAL at 20:49

## 2023-11-27 RX ADMIN — CARVEDILOL 25 MG: 12.5 TABLET, FILM COATED ORAL at 08:26

## 2023-11-27 RX ADMIN — HYDRALAZINE HYDROCHLORIDE 25 MG: 25 TABLET, FILM COATED ORAL at 08:27

## 2023-11-27 RX ADMIN — Medication 10 ML: at 08:29

## 2023-11-27 RX ADMIN — METOLAZONE 10 MG: 5 TABLET ORAL at 17:51

## 2023-11-27 RX ADMIN — TRAMADOL HYDROCHLORIDE 50 MG: 50 TABLET ORAL at 17:58

## 2023-11-27 RX ADMIN — ERYTHROPOIETIN 20000 UNITS: 20000 INJECTION, SOLUTION INTRAVENOUS; SUBCUTANEOUS at 18:29

## 2023-11-27 RX ADMIN — ROSUVASTATIN 20 MG: 20 TABLET, FILM COATED ORAL at 20:49

## 2023-11-27 RX ADMIN — SODIUM BICARBONATE 650 MG TABLET 650 MG: at 20:49

## 2023-11-27 RX ADMIN — SEVELAMER CARBONATE 800 MG: 800 TABLET, FILM COATED ORAL at 08:27

## 2023-11-27 RX ADMIN — IPRATROPIUM BROMIDE AND ALBUTEROL SULFATE 3 ML: .5; 3 SOLUTION RESPIRATORY (INHALATION) at 00:00

## 2023-11-27 RX ADMIN — METHYLPREDNISOLONE SODIUM SUCCINATE 40 MG: 40 INJECTION INTRAMUSCULAR; INTRAVENOUS at 05:57

## 2023-11-27 RX ADMIN — CARVEDILOL 25 MG: 12.5 TABLET, FILM COATED ORAL at 17:51

## 2023-11-27 RX ADMIN — IPRATROPIUM BROMIDE AND ALBUTEROL SULFATE 3 ML: .5; 3 SOLUTION RESPIRATORY (INHALATION) at 03:41

## 2023-11-27 RX ADMIN — IPRATROPIUM BROMIDE AND ALBUTEROL SULFATE 3 ML: .5; 3 SOLUTION RESPIRATORY (INHALATION) at 19:02

## 2023-11-27 RX ADMIN — SEVELAMER CARBONATE 800 MG: 800 TABLET, FILM COATED ORAL at 17:51

## 2023-11-27 RX ADMIN — PANTOPRAZOLE SODIUM 40 MG: 40 TABLET, DELAYED RELEASE ORAL at 08:26

## 2023-11-27 RX ADMIN — IPRATROPIUM BROMIDE AND ALBUTEROL SULFATE 3 ML: .5; 3 SOLUTION RESPIRATORY (INHALATION) at 16:03

## 2023-11-27 RX ADMIN — PANTOPRAZOLE SODIUM 40 MG: 40 TABLET, DELAYED RELEASE ORAL at 17:51

## 2023-11-27 RX ADMIN — IPRATROPIUM BROMIDE AND ALBUTEROL SULFATE 3 ML: .5; 3 SOLUTION RESPIRATORY (INHALATION) at 07:48

## 2023-11-27 RX ADMIN — FUROSEMIDE 20 MG: 10 INJECTION, SOLUTION INTRAMUSCULAR; INTRAVENOUS at 03:06

## 2023-11-27 RX ADMIN — BUMETANIDE 3 MG: 0.25 INJECTION INTRAMUSCULAR; INTRAVENOUS at 10:43

## 2023-11-27 RX ADMIN — CETIRIZINE HYDROCHLORIDE 10 MG: 10 TABLET, FILM COATED ORAL at 08:28

## 2023-11-27 RX ADMIN — IPRATROPIUM BROMIDE AND ALBUTEROL SULFATE 3 ML: .5; 3 SOLUTION RESPIRATORY (INHALATION) at 11:59

## 2023-11-27 NOTE — CONSULTS
11/27/23 1136   Spiritual Care   Use of Spiritual Resources non-Islam use of spiritual care   Spiritual Care Source patient request (describe)  (general support)   Spiritual Care Follow-Up follow-up planned regularly for general support   Response to Spiritual Care emotion expressed;engaged in conversation;receptive of support;thanks expressed   Spiritual Care Interventions supportive conversation provided   Spiritual Care Visit Type initial   Spiritual Care Request coping/stress of illness support;spiritual/moral support   Receptivity to Spiritual Care visit welcomed     Pt stated that this is the 3rd time she has been admitted in a short period of time. Pt expressed feeling tired due to not having a lot of sleep overnight. Follow up visit planned for another time.

## 2023-11-27 NOTE — CONSULTS
Purpose of the visit was to evaluate for: goals of care/advanced care planning. Spoke with RN, patient, and family and discussed palliative care, goals of care, care options, resuscitation status, clarify code status, and determination of decision maker.      Assessment: Visited Ms Rai in her room. She was admitted for SOA and exacerbation of COPD.  This is her 3rd hospitalization in 1 month time frame. She is currently requiring 02 at 8L via High flow nasal cannula. She becomes extremely soa with any activity.   Her  and daughter are at bedside. We discussed her condition and the treatment plans. They would like to see her return home but know with her breathing, this would be close to impossible. We discussed the hospice services and how their program could possibly help. They have ask me to send a referral for a EOS. The contact is the daughter Sanjuana.   All information sent. They have decided to keep her code status a full code for the time. They will discuss this afternoon and let me know the outcome tomorrow.       Recommendations/Plan:  EOS referral sent to hospice. Patient to remain a full code at this time.  .    Tasks Completed: Code Status clarification and Emotional Support.    Palliative care to follow and support as needed    .Padmaja LLANES RN, BSN  Palliative Care

## 2023-11-27 NOTE — SIGNIFICANT NOTE
Patient presents with shortness of breath this morning. I found patient on 5lpm nasal cannula with oxygen saturation of 88%. Breathing treatment did not seem to help patient. I placed patient on an 8lpm high flow nasal cannula. Oxygen saturation at 93%. Patient has diminished/absent breath sounds bilaterally. Edema visible on right side only. I consulted with RN, suggested new CXR due to her increase in Oxygen demand and visible shortness of breath.

## 2023-11-27 NOTE — CONSULTS
[facility]   Nephrology Consult Note      Patient Name: Charlette Rai  : 1935  MRN: [id number]  Primary Care Physician:  Rafael Lyons MD  Referring Physician: No ref. provider found  Date of admission: 2023    Subjective   Subjective     Reason for Consult/ Chief Complaint: Worsening renal function    HPI:  Charlette Rai is a 88 y.o. female with history of CKD stage IV, diabetes, hypertension, congestive heart failure and COPD who was admitted over the weekend with worsening shortness of breath.  Stated that she woke up on Saturday and could not breathe.  Denied chest pain per se no fever, no cough, no nausea or vomiting.  She was started on therapy with steroids and bronchodilators and given IV Lasix.  This is the third or fourth admission with similar  presentation.  She noticed increased lower extremity edema few days prior to admission, PCP increased her Lasix dose at home.  She also noticed increased edema right upper extremity and facial swelling.  Records reviewed and discussed with her family.    Review of Systems   All systems were reviewed and negative except for: What is mentioned above.    Personal History     Past Medical History:   Diagnosis Date    Allergic rhinitis     Anemia     Arthritis     Asthma     USE INHALERS AND NEBULIZERS    Back pain     Bladder disorder     Cancer     LEFT LUNG CANCER  SURGERY AND CHEMO DONE  AND CURRENTLY   RIGHT LUNG CANCER HAS ONLY RECEIVED RADIATION THUS FAR    Chronic kidney disease     stage 3    CKD (chronic kidney disease) stage 4, GFR 15-29 ml/min     Condition not found     ulcer    Congestive heart failure (CHF)     FOLLOWED BY DR AQUINO. DENIES CP BUT DOES HAVE SOA CHRONIC ISSUE COPD/LUNG CANCER    COPD (chronic obstructive pulmonary disease)     FOLLOWED BY DR MARIAJOSE BAILEY    Coronary artery disease     DENIES CP BUT DOES GET SOA MOST OF THE TIME WITH EXERTION BUT OCC AT REST CHRONIC ISSUE COPD/LUNG CANCER    Deep vein thrombosis      Diabetes mellitus     DOES NOT CHECK BS DAILY    Disease of thyroid gland     HYPOTHYROIDISM    Essential hypertension     Gastric ulcer     GERD (gastroesophageal reflux disease)     Heart murmur     History of transfusion     NO ISSUES POST TRANSFUSION WAS MANY YEARS AGO    Hyperlipemia     Leukocytopenia     FOLLOWED BY DR MIR BAILEY    Limb swelling     Lumbago     Lumbar spinal stenosis     Lung cancer     Migraine headache     Multiple joint pain     On home oxygen therapy     3L/NC PRN    Osteopenia     Reflux esophagitis     Shortness of breath     Thyroid nodule     HAS ULTRASOUND YEARLY BEING MONITORED    Vascular disease     Vitamin D deficiency        Past Surgical History:   Procedure Laterality Date    ABDOMINAL SURGERY      APPENDECTOMY      ARTERIOVENOUS FISTULA/SHUNT SURGERY Left 05/10/2022    Procedure: Left basilic vein transposition;  Surgeon: Wan Barksdale MD;  [facility]: [facility] OR;  Service: Vascular;  Laterality: Left;    ARTERIOVENOUS FISTULA/SHUNT SURGERY Left 3/23/2023    Procedure: Ligation of left arm arteriovenous fistula;  Surgeon: Wan Barksdale MD;  Location: [facility] MAIN OR;  Service: Vascular;  Laterality: Left;    CARDIAC CATHETERIZATION  1996    CARDIAC SURGERY      CARDIAC SURGERY      fluid drained from heart    CATARACT EXTRACTION, BILATERAL  2003    COLONOSCOPY  2014    ENDOSCOPY  2016 2019    FEMORAL ARTERY STENT Bilateral     HYSTERECTOMY      LUNG BIOPSY Left 2005    lobectomy upper lung caner    LUNG VOLUME REDUCTION      OTHER SURGICAL HISTORY      artifical joints/limbs    REPLACEMENT TOTAL KNEE Left 2016    UPPER GASTROINTESTINAL ENDOSCOPY         Family History: family history includes Arthritis in her father and mother; Cancer in her brother, brother, brother, and brother; Diabetes in her brother; Other in her brother; Prostate cancer in her brother and brother. Otherwise pertinent FHx was reviewed and not pertinent to current issue.    Social History:   reports that she quit smoking about 19 years ago. Her smoking use included cigarettes. She started smoking about 71 years ago. She has a 30.00 pack-year smoking history. She has never used smokeless tobacco. She reports that she does not drink alcohol and does not use drugs.    Home Medications:  albuterol sulfate HFA, amLODIPine, aspirin, budesonide-formoterol, carvedilol, cilostazol, dexlansoprazole, ergocalciferol, furosemide, hydrALAZINE, levocetirizine, linagliptin, nitroglycerin, polycarbophil, rosuvastatin, sennosides-docusate, sevelamer, and traMADol    Allergies:  No Known Allergies    Objective    Objective     Vitals:   Temp:  [97.5 °F (36.4 °C)-98.1 °F (36.7 °C)] 97.5 °F (36.4 °C)  Heart Rate:  [] 93  Resp:  [18-24] 22  BP: (128-162)/(43-65) 157/59  Flow (L/min):  [3-8] 8     Physical Exam    Constitutional: Awake, alert, has conversational dyspnea, facial swelling noted.   Eyes: sclerae anicteric, no conjunctival injection, pale   HENT: mucous membranes moist   Neck: Supple, no thyromegaly, no lymphadenopathy, trachea midline, + JVD, full EJ's bilaterally.   Respiratory: Decreased  to auscultation bilaterally, nonlabored respirations, scattered Rales and, no wheezing.   Cardiovascular: RRR, no murmurs, rubs, or gallops.   Gastrointestinal: Positive bowel sounds, soft, nontender, nondistended   Musculoskeletal: 1+ edema in all extremities especially right upper extremity and bilateral lower extremities, no clubbing or cyanosis   Psychiatric: Appropriate affect, cooperative   Neurologic: Oriented x 3, moving all extremities, Cranial Nerves grossly intact, speech clear   Skin: warm and dry, no rashes     Result Review    Result Reviewed:  I have personally reviewed the results from the time of this admission to 11/27/2023 17:09 EST and agree with these findings:  [x]  Laboratory  []  Microbiology  [x]  Radiology  []  EKG/Telemetry   []  Cardiology/Vascular   []  Pathology  [x]  Old records  []   Other:  Lab Results   Component Value Date    GLUCOSE 210 (H) 11/27/2023    CALCIUM 7.8 (L) 11/27/2023     11/27/2023    K 5.9 (H) 11/27/2023    CO2 18.9 (L) 11/27/2023     11/27/2023    BUN 99 (H) 11/27/2023    CREATININE 3.21 (H) 11/27/2023    EGFRIFNONA 17 (L) 01/10/2022    BCR 30.8 (H) 11/27/2023    ANIONGAP 14.1 11/27/2023      Lab Results   Component Value Date    CALCIUM 7.8 (L) 11/27/2023    PHOS 4.6 (H) 11/14/2023              Most notable findings include: Creatinine 3.2, potassium 5.9, BNP elevated, hemoglobin 7.5.    Assessment & Plan   Assessment / Plan     Brief Patient Summary:  Charlette Rai is a 88 y.o. female who has recurrent admissions with sudden onset shortness of breath and volume overload.  Has underlying COPD and advanced kidney disease.    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  Exam compatible was volume overload.  DC Lasix, give Bumex 3 mg IV every 8 hours and will give metolazone 10 mg p.o. today and tomorrow.  Long discussion with patient and her family, discussed with primary, they are agreeable to proceed with dialysis.  Discussed with her pros and cons.  Will ask vascular to see her for AV graft placement and initiation of dialysis soon.  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  - Hypertension, blood pressure should improve with better volume control.  - Bilateral renal artery stenosis, aldosterone/renin ratio was not elevated.  No  intervention.  - Congestive heart failure, diastolic dysfunction, known preserved LV function.  - History of lung cancer.  - Diabetes with complications.  - Anemia, severe.  Check iron studies in AM.  Start CASSANDRA, goal hemoglobin above 10.  - Hyperkalemia, secondary to renal failure, combination of diuretics should be helpful, on low potassium diet.  - Metabolic acidosis, mild, should improve with diuresis and initiation of dialysis.  Will add oral sodium bicarb for now and monitor.  Discussed with family and primary.  Will follow.    Thank you very much for this consult!    Electronically signed by Edi García MD, 11/27/23, 5:09 PM EST.

## 2023-11-27 NOTE — PROGRESS NOTES
[facility]     Progress Note    Patient Name: Charlette Rai  : 1935  MRN: [id number]  Primary Care Physician:  Rafael Lyons MD  Date of admission: 2023      Subjective   Brief summary.  Admitted with hypoxia and shortness of breath      HPI:  Patient became more short of breath today, hypoxia worsened, currently on 6 to 8 L oxygen.  Awake alert family at bedside.  No chest pain but tightness and shortness of breath.    Review of Systems     Denies any chest pain, increased swelling, especially right hand..  Shortness of breath.  Fatigue but no fever      Objective     Vitals:   Temp:  [97.5 °F (36.4 °C)-98.1 °F (36.7 °C)] 97.5 °F (36.4 °C)  Heart Rate:  [] 93  Resp:  [18-24] 22  BP: (128-162)/(43-65) 157/59  Flow (L/min):  [3-8] 8    Physical Exam :     Elderly female not in acute distress.  Heart regular.  Lungs diminished breath sounds with few rhonchi.  Abdomen soft.  Extremities with edema, right upper extremity and hand with edema      Result Review:  I have personally reviewed the results from the time of this admission to 2023 16:34 EST and agree with these findings:  [x]  Laboratory  []  Microbiology  []  Radiology  []  EKG/Telemetry   []  Cardiology/Vascular   []  Pathology  []  Old records  []  Other:    Creatinine 3.2 potassium 5.9      Assessment / Plan       Active Hospital Problems:  Active Hospital Problems    Diagnosis     COPD exacerbation     Hypoxia     Acute exacerbation of CHF (congestive heart failure)     Essential hypertension     GERD (gastroesophageal reflux disease)     Stage 4 chronic kidney disease     Type 2 diabetes mellitus without complication        Plan:   Patient with acute hypoxic respiratory failure, getting worse.  Worsening fluid overload on chest x-ray.  Check ABGs  Consulted nephrologist, Dr. García updated.  Switch to IV Bumex.  Patient has significant edema especially right upper extremity, elevate arm.  Discussed with patient's daughter and   and patient herself.  Patient condition guarded at this time.  Will consult pulmonary, Dr. Motta.           DVT prophylaxis:  Medical DVT prophylaxis orders are present.    CODE STATUS:   Code Status (Patient has no pulse and is not breathing): CPR (Attempt to Resuscitate)  Medical Interventions (Patient has pulse or is breathing): Full Support              Electronically signed by Rafael Lyons MD, 11/27/23, 4:40 PM EST.

## 2023-11-28 ENCOUNTER — APPOINTMENT (OUTPATIENT)
Dept: CT IMAGING | Facility: HOSPITAL | Age: 88
DRG: 981 | End: 2023-11-28
Payer: MEDICARE

## 2023-11-28 PROBLEM — I35.0 AORTIC STENOSIS, MODERATE: Status: ACTIVE | Noted: 2023-11-28

## 2023-11-28 LAB
ALBUMIN SERPL-MCNC: 3.2 G/DL (ref 3.5–5.2)
ANION GAP SERPL CALCULATED.3IONS-SCNC: 14.6 MMOL/L (ref 5–15)
BUN SERPL-MCNC: 103 MG/DL (ref 8–23)
BUN/CREAT SERPL: 31.7 (ref 7–25)
CALCIUM SPEC-SCNC: 7.7 MG/DL (ref 8.6–10.5)
CHLORIDE SERPL-SCNC: 102 MMOL/L (ref 98–107)
CO2 SERPL-SCNC: 18.4 MMOL/L (ref 22–29)
CREAT SERPL-MCNC: 3.25 MG/DL (ref 0.57–1)
DEPRECATED RDW RBC AUTO: 49.1 FL (ref 37–54)
EGFRCR SERPLBLD CKD-EPI 2021: 13.4 ML/MIN/1.73
ERYTHROCYTE [DISTWIDTH] IN BLOOD BY AUTOMATED COUNT: 13.2 % (ref 12.3–15.4)
FERRITIN SERPL-MCNC: 1251 NG/ML (ref 13–150)
GLUCOSE SERPL-MCNC: 182 MG/DL (ref 65–99)
HAV IGM SERPL QL IA: NORMAL
HBV CORE IGM SERPL QL IA: NORMAL
HBV SURFACE AG SERPL QL IA: NORMAL
HCT VFR BLD AUTO: 27 % (ref 34–46.6)
HCV AB SER DONR QL: NORMAL
HGB BLD-MCNC: 7.8 G/DL (ref 12–15.9)
IRON 24H UR-MRATE: 58 MCG/DL (ref 37–145)
IRON SATN MFR SERPL: 31 % (ref 20–50)
MCH RBC QN AUTO: 29.4 PG (ref 26.6–33)
MCHC RBC AUTO-ENTMCNC: 28.9 G/DL (ref 31.5–35.7)
MCV RBC AUTO: 101.9 FL (ref 79–97)
NT-PROBNP SERPL-MCNC: ABNORMAL PG/ML (ref 0–1800)
PHOSPHATE SERPL-MCNC: 5.9 MG/DL (ref 2.5–4.5)
PLATELET # BLD AUTO: 206 10*3/MM3 (ref 140–450)
PMV BLD AUTO: 9.8 FL (ref 6–12)
POTASSIUM SERPL-SCNC: 5.5 MMOL/L (ref 3.5–5.2)
RBC # BLD AUTO: 2.65 10*6/MM3 (ref 3.77–5.28)
SODIUM SERPL-SCNC: 135 MMOL/L (ref 136–145)
TIBC SERPL-MCNC: 185 MCG/DL (ref 298–536)
TRANSFERRIN SERPL-MCNC: 124 MG/DL (ref 200–360)
WBC NRBC COR # BLD AUTO: 6.44 10*3/MM3 (ref 3.4–10.8)

## 2023-11-28 PROCEDURE — 25010000002 ENOXAPARIN PER 10 MG: Performed by: INTERNAL MEDICINE

## 2023-11-28 PROCEDURE — 83880 ASSAY OF NATRIURETIC PEPTIDE: CPT | Performed by: INTERNAL MEDICINE

## 2023-11-28 PROCEDURE — 99223 1ST HOSP IP/OBS HIGH 75: CPT | Performed by: INTERNAL MEDICINE

## 2023-11-28 PROCEDURE — 80069 RENAL FUNCTION PANEL: CPT | Performed by: INTERNAL MEDICINE

## 2023-11-28 PROCEDURE — 85027 COMPLETE CBC AUTOMATED: CPT | Performed by: INTERNAL MEDICINE

## 2023-11-28 PROCEDURE — 94799 UNLISTED PULMONARY SVC/PX: CPT

## 2023-11-28 PROCEDURE — 84466 ASSAY OF TRANSFERRIN: CPT | Performed by: INTERNAL MEDICINE

## 2023-11-28 PROCEDURE — 99222 1ST HOSP IP/OBS MODERATE 55: CPT | Performed by: INTERNAL MEDICINE

## 2023-11-28 PROCEDURE — 97165 OT EVAL LOW COMPLEX 30 MIN: CPT

## 2023-11-28 PROCEDURE — 82728 ASSAY OF FERRITIN: CPT | Performed by: INTERNAL MEDICINE

## 2023-11-28 PROCEDURE — 99221 1ST HOSP IP/OBS SF/LOW 40: CPT | Performed by: SURGERY

## 2023-11-28 PROCEDURE — 80074 ACUTE HEPATITIS PANEL: CPT | Performed by: INTERNAL MEDICINE

## 2023-11-28 PROCEDURE — 83540 ASSAY OF IRON: CPT | Performed by: INTERNAL MEDICINE

## 2023-11-28 PROCEDURE — 94760 N-INVAS EAR/PLS OXIMETRY 1: CPT

## 2023-11-28 PROCEDURE — 25010000002 METHYLPREDNISOLONE PER 40 MG: Performed by: INTERNAL MEDICINE

## 2023-11-28 PROCEDURE — 97161 PT EVAL LOW COMPLEX 20 MIN: CPT

## 2023-11-28 PROCEDURE — 71250 CT THORAX DX C-: CPT

## 2023-11-28 PROCEDURE — 94664 DEMO&/EVAL PT USE INHALER: CPT

## 2023-11-28 RX ORDER — HEPARIN SODIUM 5000 [USP'U]/ML
5000 INJECTION, SOLUTION INTRAVENOUS; SUBCUTANEOUS EVERY 12 HOURS SCHEDULED
Status: DISCONTINUED | OUTPATIENT
Start: 2023-11-29 | End: 2023-12-05 | Stop reason: HOSPADM

## 2023-11-28 RX ORDER — BUMETANIDE 0.25 MG/ML
4 INJECTION INTRAMUSCULAR; INTRAVENOUS EVERY 8 HOURS
Status: DISCONTINUED | OUTPATIENT
Start: 2023-11-28 | End: 2023-12-01

## 2023-11-28 RX ADMIN — HYDRALAZINE HYDROCHLORIDE 25 MG: 25 TABLET, FILM COATED ORAL at 08:33

## 2023-11-28 RX ADMIN — PANTOPRAZOLE SODIUM 40 MG: 40 TABLET, DELAYED RELEASE ORAL at 17:08

## 2023-11-28 RX ADMIN — Medication 10 ML: at 08:40

## 2023-11-28 RX ADMIN — DOCUSATE SODIUM 50MG AND SENNOSIDES 8.6MG 1 TABLET: 8.6; 5 TABLET, FILM COATED ORAL at 22:20

## 2023-11-28 RX ADMIN — METHYLPREDNISOLONE SODIUM SUCCINATE 40 MG: 40 INJECTION INTRAMUSCULAR; INTRAVENOUS at 06:39

## 2023-11-28 RX ADMIN — SEVELAMER CARBONATE 800 MG: 800 TABLET, FILM COATED ORAL at 12:36

## 2023-11-28 RX ADMIN — BUMETANIDE 3 MG: 0.25 INJECTION INTRAMUSCULAR; INTRAVENOUS at 02:17

## 2023-11-28 RX ADMIN — CETIRIZINE HYDROCHLORIDE 10 MG: 10 TABLET, FILM COATED ORAL at 08:37

## 2023-11-28 RX ADMIN — BUMETANIDE 3 MG: 0.25 INJECTION INTRAMUSCULAR; INTRAVENOUS at 08:39

## 2023-11-28 RX ADMIN — ROSUVASTATIN 20 MG: 20 TABLET, FILM COATED ORAL at 22:20

## 2023-11-28 RX ADMIN — TRAMADOL HYDROCHLORIDE 50 MG: 50 TABLET ORAL at 18:27

## 2023-11-28 RX ADMIN — IPRATROPIUM BROMIDE AND ALBUTEROL SULFATE 3 ML: .5; 3 SOLUTION RESPIRATORY (INHALATION) at 15:51

## 2023-11-28 RX ADMIN — ENOXAPARIN SODIUM 30 MG: 100 INJECTION SUBCUTANEOUS at 08:33

## 2023-11-28 RX ADMIN — CILOSTAZOL 100 MG: 100 TABLET ORAL at 08:33

## 2023-11-28 RX ADMIN — TRAMADOL HYDROCHLORIDE 50 MG: 50 TABLET ORAL at 02:28

## 2023-11-28 RX ADMIN — SEVELAMER CARBONATE 800 MG: 800 TABLET, FILM COATED ORAL at 17:08

## 2023-11-28 RX ADMIN — SEVELAMER CARBONATE 800 MG: 800 TABLET, FILM COATED ORAL at 08:33

## 2023-11-28 RX ADMIN — METOLAZONE 10 MG: 5 TABLET ORAL at 08:33

## 2023-11-28 RX ADMIN — CARVEDILOL 25 MG: 12.5 TABLET, FILM COATED ORAL at 08:33

## 2023-11-28 RX ADMIN — IPRATROPIUM BROMIDE AND ALBUTEROL SULFATE 3 ML: .5; 3 SOLUTION RESPIRATORY (INHALATION) at 12:38

## 2023-11-28 RX ADMIN — IPRATROPIUM BROMIDE AND ALBUTEROL SULFATE 3 ML: .5; 3 SOLUTION RESPIRATORY (INHALATION) at 23:18

## 2023-11-28 RX ADMIN — IPRATROPIUM BROMIDE AND ALBUTEROL SULFATE 3 ML: .5; 3 SOLUTION RESPIRATORY (INHALATION) at 07:16

## 2023-11-28 RX ADMIN — PANTOPRAZOLE SODIUM 40 MG: 40 TABLET, DELAYED RELEASE ORAL at 06:39

## 2023-11-28 RX ADMIN — SODIUM BICARBONATE 650 MG TABLET 650 MG: at 22:20

## 2023-11-28 RX ADMIN — SODIUM BICARBONATE 650 MG TABLET 650 MG: at 08:33

## 2023-11-28 RX ADMIN — BUMETANIDE 4 MG: 0.25 INJECTION INTRAMUSCULAR; INTRAVENOUS at 17:11

## 2023-11-28 RX ADMIN — HYDRALAZINE HYDROCHLORIDE 25 MG: 25 TABLET, FILM COATED ORAL at 22:20

## 2023-11-28 RX ADMIN — IPRATROPIUM BROMIDE AND ALBUTEROL SULFATE 3 ML: .5; 3 SOLUTION RESPIRATORY (INHALATION) at 19:17

## 2023-11-28 RX ADMIN — LINAGLIPTIN 5 MG: 5 TABLET, FILM COATED ORAL at 08:33

## 2023-11-28 RX ADMIN — IPRATROPIUM BROMIDE AND ALBUTEROL SULFATE 3 ML: .5; 3 SOLUTION RESPIRATORY (INHALATION) at 00:17

## 2023-11-28 RX ADMIN — CILOSTAZOL 100 MG: 100 TABLET ORAL at 22:20

## 2023-11-28 RX ADMIN — IPRATROPIUM BROMIDE AND ALBUTEROL SULFATE 3 ML: .5; 3 SOLUTION RESPIRATORY (INHALATION) at 03:30

## 2023-11-28 RX ADMIN — CARVEDILOL 25 MG: 12.5 TABLET, FILM COATED ORAL at 17:10

## 2023-11-28 RX ADMIN — Medication 10 ML: at 22:20

## 2023-11-28 NOTE — PROGRESS NOTES
[facility]     Progress Note    Patient Name: Charlette Rai  : 1935  MRN: [id number]  Primary Care Physician:  Rafael Lyons MD  Date of admission: 2023      Subjective   Brief summary.  Admitted with hypoxia and shortness of breath      HPI:  Less short of breath today.  More alert, sitting and eating breakfast.  No chest pain.  Shortness of breath still there with minimal exertion.  Swelling persist but improving.  Seen by cardiologist and nephrologist    Review of Systems     No chest pain today.  Shortness of breath on and off, swelling of the lower extremities      Objective     Vitals:   Temp:  [97.6 °F (36.4 °C)-97.7 °F (36.5 °C)] 97.7 °F (36.5 °C)  Heart Rate:  [94-99] 98  Resp:  [16-24] 20  BP: (120-144)/(44-57) 129/44  Flow (L/min):  [6-8] 6    Physical Exam :     Elderly female not in acute distress.  Heart regular.  Lungs diminished breath sounds with few rhonchi.  Abdomen soft.  Extremities with edema, right upper extremity and hand with edema  Neuro awake alert and oriented today      Result Review:  I have personally reviewed the results from the time of this admission to 2023 16:45 EST and agree with these findings:  [x]  Laboratory  []  Microbiology  []  Radiology  []  EKG/Telemetry   []  Cardiology/Vascular   []  Pathology  []  Old records  []  Other:        Assessment / Plan       Active Hospital Problems:  Active Hospital Problems    Diagnosis     Aortic stenosis, moderate     COPD exacerbation     Hypoxia     Acute hypoxic respiratory failure     Acute exacerbation of CHF (congestive heart failure)     Essential hypertension     GERD (gastroesophageal reflux disease)     Stage 4 chronic kidney disease     Type 2 diabetes mellitus without complication        Plan:   Patient seen by nephrologist plan for dialysis  On high-dose diuretics  Cardiology consult, Dr. Sanchez follows patient as an outpatient requested consult for opinion.  Palliative care team seen patient and he  changed CODE STATUS to DNR and DNI per patient request  Monitor labs    DVT prophylaxis:  Medical DVT prophylaxis orders are present.    CODE STATUS:   Medical Intervention Limits: NO intubation (DNI)  Level Of Support Discussed With: Patient  Code Status (Patient has no pulse and is not breathing): No CPR (Do Not Attempt to Resuscitate)  Medical Interventions (Patient has pulse or is breathing): Limited Support                Electronically signed by Rafael Lyons MD, 11/28/23, 4:46 PM EST.

## 2023-11-28 NOTE — CONSULTS
[facility]   VASCULAR SURGERY CONSULT    Patient Name: Charlette Rai  : 1935  MRN: [id number]  Primary Care Physician:  Rafael Lyons MD  Date of admission: 2023    Subjective   Subjective     Chief Complaint: Need for long-term access for hemodialysis    HPI:    Charlette Rai is a 88 y.o. female known to me from previous evaluation and intervention who presents with acute exacerbation of her difficulty breathing.  Plans are in progress to initiate hemodialysis.  I have been asked to evaluate for permanent access.  The patient indicates that she has yet to make the decision as to whether to start hemodialysis.  She is discussing with her family whether to continue treatment versus the possibility of hospice.    Review of Systems    Noncontributory except for the history of present illness.    Personal History     Past Medical History:   Diagnosis Date    Allergic rhinitis     Anemia     Arthritis     Asthma     USE INHALERS AND NEBULIZERS    Back pain     Bladder disorder     Cancer     LEFT LUNG CANCER  SURGERY AND CHEMO DONE  AND CURRENTLY   RIGHT LUNG CANCER HAS ONLY RECEIVED RADIATION THUS FAR    Chronic kidney disease     stage 3    CKD (chronic kidney disease) stage 4, GFR 15-29 ml/min     Condition not found     ulcer    Congestive heart failure (CHF)     FOLLOWED BY DR AQUINO. DENIES CP BUT DOES HAVE SOA CHRONIC ISSUE COPD/LUNG CANCER    COPD (chronic obstructive pulmonary disease)     FOLLOWED BY DR MARIAJOSE BAILEY    Coronary artery disease     DENIES CP BUT DOES GET SOA MOST OF THE TIME WITH EXERTION BUT OCC AT REST CHRONIC ISSUE COPD/LUNG CANCER    Deep vein thrombosis     Diabetes mellitus     DOES NOT CHECK BS DAILY    Disease of thyroid gland     HYPOTHYROIDISM    Essential hypertension     Gastric ulcer     GERD (gastroesophageal reflux disease)     Heart murmur     History of transfusion     NO ISSUES POST TRANSFUSION WAS MANY YEARS AGO    Hyperlipemia     Leukocytopenia      FOLLOWED BY DR MIR BAILEY    Limb swelling     Lumbago     Lumbar spinal stenosis     Lung cancer     Migraine headache     Multiple joint pain     On home oxygen therapy     3L/NC PRN    Osteopenia     Reflux esophagitis     Shortness of breath     Thyroid nodule     HAS ULTRASOUND YEARLY BEING MONITORED    Vascular disease     Vitamin D deficiency        Past Surgical History:   Procedure Laterality Date    ABDOMINAL SURGERY      APPENDECTOMY      ARTERIOVENOUS FISTULA/SHUNT SURGERY Left 05/10/2022    Procedure: Left basilic vein transposition;  Surgeon: Wan Barksdale MD;  Location: [facility] MAIN OR;  Service: Vascular;  Laterality: Left;    ARTERIOVENOUS FISTULA/SHUNT SURGERY Left 3/23/2023    Procedure: Ligation of left arm arteriovenous fistula;  Surgeon: Wan Barksdale MD;  Location: [facility] MAIN OR;  Service: Vascular;  Laterality: Left;    CARDIAC CATHETERIZATION  1996    CARDIAC SURGERY      CARDIAC SURGERY      fluid drained from heart    CATARACT EXTRACTION, BILATERAL  2003    COLONOSCOPY  2014    ENDOSCOPY  2016 2019    FEMORAL ARTERY STENT Bilateral     HYSTERECTOMY      LUNG BIOPSY Left 2005    lobectomy upper lung caner    LUNG VOLUME REDUCTION      OTHER SURGICAL HISTORY      artifical joints/limbs    REPLACEMENT TOTAL KNEE Left 2016    UPPER GASTROINTESTINAL ENDOSCOPY         Family History: family history includes Arthritis in her father and mother; Cancer in her brother, brother, brother, and brother; Diabetes in her brother; Other in her brother; Prostate cancer in her brother and brother. Otherwise pertinent FHx was reviewed and not pertinent to current issue.    Social History:  reports that she quit smoking about 19 years ago. Her smoking use included cigarettes. She started smoking about 71 years ago. She has a 30.00 pack-year smoking history. She has never used smokeless tobacco. She reports that she does not drink alcohol and does not use drugs.    Home Medications:  albuterol sulfate  HFA, amLODIPine, aspirin, budesonide-formoterol, carvedilol, cilostazol, dexlansoprazole, ergocalciferol, furosemide, hydrALAZINE, levocetirizine, linagliptin, nitroglycerin, polycarbophil, rosuvastatin, sennosides-docusate, sevelamer, and traMADol      Allergies:  No Known Allergies    Objective   Objective     Vitals:   Temp:  [97.5 °F (36.4 °C)-97.7 °F (36.5 °C)] 97.7 °F (36.5 °C)  Heart Rate:  [93-99] 99  Resp:  [16-24] 20  BP: (120-157)/(44-59) 129/44  Flow (L/min):  [6-8] 6    Physical Exam    General: Awake, alert, NAD.  On oxygen.   Eyes:  SANTIAGO   Neck: Supple   Lungs: Clear   Heart: RRR   Abdomen: benign   Musculoskeletal:  normal motor tone, symmetric   Skin: warm, normal turgor   Neuro: strength 5/5 all extremities       Assessment & Plan   Assessment / Plan     Active Hospital Problems:  Active Hospital Problems    Diagnosis     Aortic stenosis, moderate     COPD exacerbation     Hypoxia     Acute hypoxic respiratory failure     Acute exacerbation of CHF (congestive heart failure)     Essential hypertension     GERD (gastroesophageal reflux disease)     Stage 4 chronic kidney disease     Type 2 diabetes mellitus without complication        Assessment/plan:   Mrs. Rai is being considered for initiation of hemodialysis.  She previously had a left arm fistula created which had to be ligated due to steal syndrome.  She is currently considering whether to proceed with hemodialysis versus the alternative going into hospice.  I have briefly discussed with her management options.  She is currently on high flow oxygen and it is unclear whether she would be able to tolerate the procedure of a graft.  Will discuss with her further depending on whether she decides to proceed with hemodialysis or not.        Electronically signed by Wan Barksdale MD, 11/28/23, 2:00 PM EST.

## 2023-11-28 NOTE — CONSULTS
Pulmonary / Critical Care Consult Note      Patient Name: Charlette Rai  : 1935  MRN: [id number]  Primary Care Physician:  Rafael Lyons MD  Referring Physician: Rafael Lyons MD  Date of admission: 2023    Subjective   Subjective     Reason for Consult/ Chief Complaint:   Respiratory failure and hypoxia    HPI:  Charlette Rai is a 88 y.o. female history of COPD, lung cancer status post lobectomy on the left side, CKD stage IV versus V with recent hospitalization for volume overload and Pseudomonas pneumonia earlier this month is admitted to the hospital shortness of breath.  She came in with increasing shortness of breath over the last few days.  Intermission she cannot walk without her saturations dropping into the low 80s on room air.  She was placed on 2 to 3 L of oxygen with some improvement.  During this hospitalization she has had increasing hypoxia and is currently on 8 L/min.  She has had increasing shortness of breath and orthopnea.  Denies any coughing or wheezing.  Is volume overloaded.  Nephrology did see the patient and adjusted diuretics however they are concerned that she has end-stage renal disease and will likely need to be dialyzed in the near future.  Does have leg swelling and weight gain.  Denies any chest pain or hemoptysis.    Review of Systems  Constitutional symptoms: Fatigue and weakness, otherwise denied complaints   Ear, nose, throat: Denied complaints  Cardiovascular: Leg swelling and orthopnea, otherwise denied complaints  Respiratory: Dyspnea, otherwise denied complaints  Gastrointestinal: Denied complaints  Musculoskeletal: Denied complaints  Genitourinary: Denied complaints  Allergy / Immunology: Denied complaints  Hematologic: Denied complaints  Neurologic: Denied complaints  Skin: Denied complaints  Endocrine: Denied complaints  Psychiatric: Denied complaints      Personal History     Past Medical History:   Diagnosis Date    Allergic rhinitis     Anemia     Arthritis      Asthma     USE INHALERS AND NEBULIZERS    Back pain     Bladder disorder     Cancer     LEFT LUNG CANCER 2005 SURGERY AND CHEMO DONE  AND CURRENTLY 4/ 14/22  RIGHT LUNG CANCER HAS ONLY RECEIVED RADIATION THUS FAR    Chronic kidney disease     stage 3    CKD (chronic kidney disease) stage 4, GFR 15-29 ml/min     Condition not found     ulcer    Congestive heart failure (CHF)     FOLLOWED BY DR AQUINO. DENIES CP BUT DOES HAVE SOA CHRONIC ISSUE COPD/LUNG CANCER    COPD (chronic obstructive pulmonary disease)     FOLLOWED BY DR MARIAJOSE BAILEY    Coronary artery disease     DENIES CP BUT DOES GET SOA MOST OF THE TIME WITH EXERTION BUT OCC AT REST CHRONIC ISSUE COPD/LUNG CANCER    Deep vein thrombosis     Diabetes mellitus     DOES NOT CHECK BS DAILY    Disease of thyroid gland     HYPOTHYROIDISM    Essential hypertension     Gastric ulcer     GERD (gastroesophageal reflux disease)     Heart murmur     History of transfusion     NO ISSUES POST TRANSFUSION WAS MANY YEARS AGO    Hyperlipemia     Leukocytopenia     FOLLOWED BY DR MIR BAILEY    Limb swelling     Lumbago     Lumbar spinal stenosis     Lung cancer     Migraine headache     Multiple joint pain     On home oxygen therapy     3L/NC PRN    Osteopenia     Reflux esophagitis     Shortness of breath     Thyroid nodule     HAS ULTRASOUND YEARLY BEING MONITORED    Vascular disease     Vitamin D deficiency        Past Surgical History:   Procedure Laterality Date    ABDOMINAL SURGERY      APPENDECTOMY      ARTERIOVENOUS FISTULA/SHUNT SURGERY Left 05/10/2022    Procedure: Left basilic vein transposition;  Surgeon: Wan Barksdale MD;  Location: [facility] MAIN OR;  Service: Vascular;  Laterality: Left;    ARTERIOVENOUS FISTULA/SHUNT SURGERY Left 3/23/2023    Procedure: Ligation of left arm arteriovenous fistula;  Surgeon: Wan Barksdale MD;  Location: [facility] MAIN OR;  Service: Vascular;  Laterality: Left;    CARDIAC CATHETERIZATION  1996    CARDIAC SURGERY      CARDIAC SURGERY       fluid drained from heart    CATARACT EXTRACTION, BILATERAL  2003    COLONOSCOPY  2014    ENDOSCOPY  2016 2019    FEMORAL ARTERY STENT Bilateral     HYSTERECTOMY      LUNG BIOPSY Left 2005    lobectomy upper lung caner    LUNG VOLUME REDUCTION      OTHER SURGICAL HISTORY      artifical joints/limbs    REPLACEMENT TOTAL KNEE Left 2016    UPPER GASTROINTESTINAL ENDOSCOPY         Family History: family history includes Arthritis in her father and mother; Cancer in her brother, brother, brother, and brother; Diabetes in her brother; Other in her brother; Prostate cancer in her brother and brother. Otherwise pertinent FHx was reviewed and not pertinent to current issue.    Social History:  reports that she quit smoking about 19 years ago. Her smoking use included cigarettes. She started smoking about 71 years ago. She has a 30.00 pack-year smoking history. She has never used smokeless tobacco. She reports that she does not drink alcohol and does not use drugs.    Home Medications:  albuterol sulfate HFA, amLODIPine, aspirin, budesonide-formoterol, carvedilol, cilostazol, dexlansoprazole, ergocalciferol, furosemide, hydrALAZINE, levocetirizine, linagliptin, nitroglycerin, polycarbophil, rosuvastatin, sennosides-docusate, sevelamer, and traMADol    Allergies:  No Known Allergies    Objective    Objective     Vitals:   Temp:  [97.5 °F (36.4 °C)-97.7 °F (36.5 °C)] 97.7 °F (36.5 °C)  Heart Rate:  [93-98] 96  Resp:  [16-24] 20  BP: (120-157)/(46-59) 144/57  Flow (L/min):  [6-8] 6    Physical Exam:  Vital Signs Reviewed   General:  WDWN, Alert, NAD.    HEENT:  PERRL, EOMI.  OP, nares clear, no sinus tenderness  Neck:  Supple, no JVD, no thyromegaly  Lymph: no axillary, cervical, supraclavicular lymphadenopathy noted bilaterally  Chest:  good aeration, coarse crackles and rhonchi bilaterally, tympanic to percussion bilaterally, pursed lip breathing noted  CV: RRR, no MGR, pulses 2+, equal.  Abd:  Soft, NT, ND, + BS, no  HSM  EXT:  no clubbing, no cyanosis, BLE edema, no joint tenderness  Neuro:  A&Ox3, CN grossly intact, no focal deficits.  Skin: No rashes or lesions noted      Result Review    Result Review:  I have personally reviewed the results from the time of this admission to 11/28/2023 11:13 EST and agree with these findings:  [x]  Laboratory  [x]  Microbiology  [x]  Radiology  [x]  EKG/Telemetry   [x]  Cardiology/Vascular   []  Pathology  [x]  Old records  []  Other:  Most notable findings include:   Previous hospital records and discharge summary personally reviewed.  Last sputum culture during hospitalization grew Pseudomonas.    Chest x-ray with worsening bilateral infiltrates and history of lobectomy noted on the left  NT BNP elevated        Lab 11/28/23  0415 11/27/23  1700 11/27/23  0428 11/26/23  0340 11/25/23  2154 11/25/23  1313   WBC 6.44  --  5.63 4.27  --  9.50   HEMOGLOBIN 7.8*  --  7.5* 7.8*  --  8.1*   HEMATOCRIT 27.0*  --  25.8* 25.8*  --  26.9*   PLATELETS 206  --  214 194  --  191   SODIUM 135*  --  136 139  --  140   SODIUM, ARTERIAL  --  137.6  --   --  139.4  --    POTASSIUM 5.5*  --  5.9* 5.6*  --  5.3*   CHLORIDE 102  --  103 107  --  107   CO2 18.4*  --  18.9* 18.1*  --  20.0*   *  --  99* 88*  --  80*   CREATININE 3.25*  --  3.21* 2.80*  --  2.92*   GLUCOSE 182*  --  210* 175*  --  204*   GLUCOSE, ARTERIAL  --  175*  --   --  173*  --    CALCIUM 7.7*  --  7.8* 8.3*  --  8.4*   PHOSPHORUS 5.9*  --   --   --   --   --    TOTAL PROTEIN  --   --  6.1  --   --  6.6   ALBUMIN 3.2*  --  3.2*  --   --  3.5   GLOBULIN  --   --  2.9  --   --  3.1       Results for orders placed during the hospital encounter of 10/29/23    Adult Transthoracic Echo Complete W/ Cont if Necessary Per Protocol    Interpretation Summary    Left ventricular systolic function is normal. Calculated left ventricular EF = 56.8%    Left ventricular wall thickness is consistent with mild asymmetric hypertrophy.    Left ventricular  diastolic function is consistent with (grade I) impaired relaxation.    Left atrial volume is moderately increased.    Moderate aortic valve stenosis is present.    Estimated right ventricular systolic pressure from tricuspid regurgitation is normal (<35 mmHg).    Mild dilation of the aortic root is present.        Assessment & Plan   Assessment / Plan     Active Hospital Problems:  Active Hospital Problems    Diagnosis     Aortic stenosis, moderate     COPD exacerbation     Hypoxia     Acute hypoxic respiratory failure     Acute exacerbation of CHF (congestive heart failure)     Essential hypertension     GERD (gastroesophageal reflux disease)     Stage 4 chronic kidney disease     Type 2 diabetes mellitus without complication        Impression:  Acute hypoxemic respiratory failure  Acute decompensated diastolic congestive heart failure  Volume overload  Acute cardiogenic pulmonary edema  End-stage renal disease.  Approaching hemodialysis  COPD without exacerbation  History of lung cancer status post lobectomy on the left in 2005  Essential hypertension    Plan:  This patient has no clinical symptoms of pneumonia and no evidence of COPD exacerbation.  Given her presentation I suspect that she has pulmonary edema due to volume overload and end-stage renal disease  Will check noncontrast chest CT to evaluate for other etiologies of her hypoxia and pleural effusions  Agree with adjustments in diuretics by nephrology.  Currently on Bumex and metolazone.  Ultimately she may end up need to be dialyzed to improve her respiratory status  NT BNP 7803 days ago and currently 26,000  Continue DuoNebs blood pressure control per hospitalist and  Nephrology  Trend renal panel and electrolytes  Wean O2 to keep SPO2 greater than 90%    DVT prophylaxis:  Medical DVT prophylaxis orders are present.     Code Status and Medical Interventions:   Ordered at: 11/25/23 2692     Code Status (Patient has no pulse and is not breathing):     CPR (Attempt to Resuscitate)     Medical Interventions (Patient has pulse or is breathing):    Full Support        Labs, imaging, notes and medications personally reviewed.  Discussed with primary    Thank you for involving me in the care of this patient.    Electronically signed by Clement Motta MD, 11/28/23, 11:13 AM EST.

## 2023-11-28 NOTE — H&P (VIEW-ONLY)
[facility]   VASCULAR SURGERY CONSULT    Patient Name: Charlette Rai  : 1935  MRN: [id number]  Primary Care Physician:  Rafael Lyons MD  Date of admission: 2023    Subjective   Subjective     Chief Complaint: Need for long-term access for hemodialysis    HPI:    Charlette Rai is a 88 y.o. female known to me from previous evaluation and intervention who presents with acute exacerbation of her difficulty breathing.  Plans are in progress to initiate hemodialysis.  I have been asked to evaluate for permanent access.  The patient indicates that she has yet to make the decision as to whether to start hemodialysis.  She is discussing with her family whether to continue treatment versus the possibility of hospice.    Review of Systems    Noncontributory except for the history of present illness.    Personal History     Past Medical History:   Diagnosis Date    Allergic rhinitis     Anemia     Arthritis     Asthma     USE INHALERS AND NEBULIZERS    Back pain     Bladder disorder     Cancer     LEFT LUNG CANCER  SURGERY AND CHEMO DONE  AND CURRENTLY   RIGHT LUNG CANCER HAS ONLY RECEIVED RADIATION THUS FAR    Chronic kidney disease     stage 3    CKD (chronic kidney disease) stage 4, GFR 15-29 ml/min     Condition not found     ulcer    Congestive heart failure (CHF)     FOLLOWED BY DR AQUINO. DENIES CP BUT DOES HAVE SOA CHRONIC ISSUE COPD/LUNG CANCER    COPD (chronic obstructive pulmonary disease)     FOLLOWED BY DR MARIAJOSE BAILEY    Coronary artery disease     DENIES CP BUT DOES GET SOA MOST OF THE TIME WITH EXERTION BUT OCC AT REST CHRONIC ISSUE COPD/LUNG CANCER    Deep vein thrombosis     Diabetes mellitus     DOES NOT CHECK BS DAILY    Disease of thyroid gland     HYPOTHYROIDISM    Essential hypertension     Gastric ulcer     GERD (gastroesophageal reflux disease)     Heart murmur     History of transfusion     NO ISSUES POST TRANSFUSION WAS MANY YEARS AGO    Hyperlipemia     Leukocytopenia      FOLLOWED BY DR MIR BAILEY    Limb swelling     Lumbago     Lumbar spinal stenosis     Lung cancer     Migraine headache     Multiple joint pain     On home oxygen therapy     3L/NC PRN    Osteopenia     Reflux esophagitis     Shortness of breath     Thyroid nodule     HAS ULTRASOUND YEARLY BEING MONITORED    Vascular disease     Vitamin D deficiency        Past Surgical History:   Procedure Laterality Date    ABDOMINAL SURGERY      APPENDECTOMY      ARTERIOVENOUS FISTULA/SHUNT SURGERY Left 05/10/2022    Procedure: Left basilic vein transposition;  Surgeon: Wan Barksdale MD;  Location: [facility] MAIN OR;  Service: Vascular;  Laterality: Left;    ARTERIOVENOUS FISTULA/SHUNT SURGERY Left 3/23/2023    Procedure: Ligation of left arm arteriovenous fistula;  Surgeon: Wan Barksdale MD;  Location: [facility] MAIN OR;  Service: Vascular;  Laterality: Left;    CARDIAC CATHETERIZATION  1996    CARDIAC SURGERY      CARDIAC SURGERY      fluid drained from heart    CATARACT EXTRACTION, BILATERAL  2003    COLONOSCOPY  2014    ENDOSCOPY  2016 2019    FEMORAL ARTERY STENT Bilateral     HYSTERECTOMY      LUNG BIOPSY Left 2005    lobectomy upper lung caner    LUNG VOLUME REDUCTION      OTHER SURGICAL HISTORY      artifical joints/limbs    REPLACEMENT TOTAL KNEE Left 2016    UPPER GASTROINTESTINAL ENDOSCOPY         Family History: family history includes Arthritis in her father and mother; Cancer in her brother, brother, brother, and brother; Diabetes in her brother; Other in her brother; Prostate cancer in her brother and brother. Otherwise pertinent FHx was reviewed and not pertinent to current issue.    Social History:  reports that she quit smoking about 19 years ago. Her smoking use included cigarettes. She started smoking about 71 years ago. She has a 30.00 pack-year smoking history. She has never used smokeless tobacco. She reports that she does not drink alcohol and does not use drugs.    Home Medications:  albuterol sulfate  HFA, amLODIPine, aspirin, budesonide-formoterol, carvedilol, cilostazol, dexlansoprazole, ergocalciferol, furosemide, hydrALAZINE, levocetirizine, linagliptin, nitroglycerin, polycarbophil, rosuvastatin, sennosides-docusate, sevelamer, and traMADol      Allergies:  No Known Allergies    Objective   Objective     Vitals:   Temp:  [97.5 °F (36.4 °C)-97.7 °F (36.5 °C)] 97.7 °F (36.5 °C)  Heart Rate:  [93-99] 99  Resp:  [16-24] 20  BP: (120-157)/(44-59) 129/44  Flow (L/min):  [6-8] 6    Physical Exam    General: Awake, alert, NAD.  On oxygen.   Eyes:  SANTIAGO   Neck: Supple   Lungs: Clear   Heart: RRR   Abdomen: benign   Musculoskeletal:  normal motor tone, symmetric   Skin: warm, normal turgor   Neuro: strength 5/5 all extremities       Assessment & Plan   Assessment / Plan     Active Hospital Problems:  Active Hospital Problems    Diagnosis     Aortic stenosis, moderate     COPD exacerbation     Hypoxia     Acute hypoxic respiratory failure     Acute exacerbation of CHF (congestive heart failure)     Essential hypertension     GERD (gastroesophageal reflux disease)     Stage 4 chronic kidney disease     Type 2 diabetes mellitus without complication        Assessment/plan:   Mrs. Rai is being considered for initiation of hemodialysis.  She previously had a left arm fistula created which had to be ligated due to steal syndrome.  She is currently considering whether to proceed with hemodialysis versus the alternative going into hospice.  I have briefly discussed with her management options.  She is currently on high flow oxygen and it is unclear whether she would be able to tolerate the procedure of a graft.  Will discuss with her further depending on whether she decides to proceed with hemodialysis or not.        Electronically signed by Wan Barksdale MD, 11/28/23, 2:00 PM EST.

## 2023-11-28 NOTE — CASE MANAGEMENT/SOCIAL WORK
Ms. Rai admitted with COPD exacerbation.  This is patient's 3rd admission since 10/29/23.    ABG reviewed 7.28/44.2/76/20.3 and it appears this admission is most likely related to patient's end stage renal disease, not a COPD exacerbation.    Palliative consultation placed for goals of care.

## 2023-11-28 NOTE — NURSING NOTE
Visited Ms Rai in her room. She is alert and oriented x 4. She remains on high flow 02. She becomes very short of breath with any activity.   She has no complaint of pain except her right hand. It has 4 + edema. It is elevated on a  pillow.   We discussed the [facility] hospice meeting at 130 for EOS. She tells me she has a living will and is to be a DNR/DNI. I have notified Dr Lyons of the living iwll and of the confirmation by Dr Aviles and Dr Sanchez. Dr Lyons is to address when he sees her today.  She also informed me, Dr Aviles has now suggested starting hemodialysis d/t her declining renal function. She tells me she is not sure she wants to pursue this or to with hospice. I will follow back up after her meeting.   Palliative care will continue to monitor and suppot..

## 2023-11-28 NOTE — THERAPY EVALUATION
Patient Name: Charlette Rai  : 1935    MRN: [id number]                              Today's Date: 2023       Admit Date: 2023    Visit Dx:     ICD-10-CM ICD-9-CM   1. Acute exacerbation of chronic obstructive pulmonary disease (COPD)  J44.1 491.21   2. Hypoxia  R09.02 799.02   3. Difficulty walking  R26.2 719.7     Patient Active Problem List   Diagnosis    Malignant neoplasm of upper lobe of right lung    Allergic rhinitis    Rheumatoid arthritis    Asthma    Chronic obstructive pulmonary disease    Acute exacerbation of CHF (congestive heart failure)    Essential hypertension    GERD (gastroesophageal reflux disease)    Hyperlipidemia LDL goal <70    Lumbar spinal stenosis    Migraine    Monoclonal paraproteinemia    Osteopenia    Oxygen dependent    Peripheral neuropathy    Stage 4 chronic kidney disease    Type 2 diabetes mellitus without complication    Vitamin D deficiency    Carotid artery stenosis    Chronic right-sided thoracic back pain    Acute pain of left shoulder    Peripheral vascular disease of lower extremity    Edema    Ex-smoker    Peripheral vascular disease    De Quervain's tenosynovitis    Arthritis of carpometacarpal (CMC) joint of right thumb    Arthritis of right hand    Steal syndrome of dialysis vascular access    Anemia of chronic renal failure, stage 4 (severe)    Acute hypoxic respiratory failure    Anemia of chronic disease    COPD exacerbation    Hypoxia    Aortic stenosis, moderate     Past Medical History:   Diagnosis Date    Allergic rhinitis     Anemia     Arthritis     Asthma     USE INHALERS AND NEBULIZERS    Back pain     Bladder disorder     Cancer     LEFT LUNG CANCER  SURGERY AND CHEMO DONE  AND CURRENTLY   RIGHT LUNG CANCER HAS ONLY RECEIVED RADIATION THUS FAR    Chronic kidney disease     stage 3    CKD (chronic kidney disease) stage 4, GFR 15-29 ml/min     Condition not found     ulcer    Congestive heart failure (CHF)     FOLLOWED BY   [facility]. DENIES CP BUT DOES HAVE SOA CHRONIC ISSUE COPD/LUNG CANCER    COPD (chronic obstructive pulmonary disease)     FOLLOWED BY DR MARIAJOSE BAILEY    Coronary artery disease     DENIES CP BUT DOES GET SOA MOST OF THE TIME WITH EXERTION BUT OCC AT REST CHRONIC ISSUE COPD/LUNG CANCER    Deep vein thrombosis     Diabetes mellitus     DOES NOT CHECK BS DAILY    Disease of thyroid gland     HYPOTHYROIDISM    Essential hypertension     Gastric ulcer     GERD (gastroesophageal reflux disease)     Heart murmur     History of transfusion     NO ISSUES POST TRANSFUSION WAS MANY YEARS AGO    Hyperlipemia     Leukocytopenia     FOLLOWED BY DR MIR BAILEY    Limb swelling     Lumbago     Lumbar spinal stenosis     Lung cancer     Migraine headache     Multiple joint pain     On home oxygen therapy     3L/NC PRN    Osteopenia     Reflux esophagitis     Shortness of breath     Thyroid nodule     HAS ULTRASOUND YEARLY BEING MONITORED    Vascular disease     Vitamin D deficiency      Past Surgical History:   Procedure Laterality Date    ABDOMINAL SURGERY      APPENDECTOMY      ARTERIOVENOUS FISTULA/SHUNT SURGERY Left 05/10/2022    Procedure: Left basilic vein transposition;  Surgeon: Wan Barksdale MD;  Location: [facility];  Service: Vascular;  Laterality: Left;    ARTERIOVENOUS FISTULA/SHUNT SURGERY Left 3/23/2023    Procedure: Ligation of left arm arteriovenous fistula;  Surgeon: Wan Barksdale MD;  Location: [facility];  Service: Vascular;  Laterality: Left;    CARDIAC CATHETERIZATION  1996    CARDIAC SURGERY      CARDIAC SURGERY      fluid drained from heart    CATARACT EXTRACTION, BILATERAL  2003    COLONOSCOPY  2014    ENDOSCOPY  2016    2019    FEMORAL ARTERY STENT Bilateral     HYSTERECTOMY      LUNG BIOPSY Left 2005    lobectomy upper lung caner    LUNG VOLUME REDUCTION      OTHER SURGICAL HISTORY      artifical joints/limbs    REPLACEMENT TOTAL KNEE Left 2016    UPPER GASTROINTESTINAL ENDOSCOPY        General  Information       Row Name 11/28/23 1312          OT Time and Intention    Document Type evaluation  -PG     Mode of Treatment individual therapy;occupational therapy  -PG       Row Name 11/28/23 1312          General Information    Patient Profile Reviewed yes  Patient lives with spouse and reports requiring some assistance at home with her ADLs.  Uses rolling walker and O2 at night.  2 recent admissions to the hospital  -PG     Prior Level of Function ADL's;mod assist:  -PG     Existing Precautions/Restrictions fall  -PG     Barriers to Rehab none identified  -PG       Row Name 11/28/23 1312          Occupational Profile    Reason for Services/Referral (Occupational Profile) Patient is a 88-year-old female admitted for COPD exacerbation and hypoxia.  Patient has been receiving home health services at home since her last admission.  Patient now being evaluated by Occupational Therapy due to recent decline in ADL function.  -PG       Row Name 11/28/23 1312          Living Environment    People in Home spouse  -PG       Row Name 11/28/23 1312          Cognition    Orientation Status (Cognition) oriented x 3  -PG       Row Name 11/28/23 1312          Safety Issues, Functional Mobility    Impairments Affecting Function (Mobility) balance;endurance/activity tolerance;strength;shortness of breath  -PG               User Key  (r) = Recorded By, (t) = Taken By, (c) = Cosigned By      Initials Name Provider Type    PG Laron Mosqueda, MARCELLE Occupational Therapist                     Mobility/ADL's       Row Name 11/28/23 1313          Transfers    Comment, (Transfers) Patient declined transfers  -PG       Row Name 11/28/23 1313          Activities of Daily Living    BADL Assessment/Intervention bathing;upper body dressing;lower body dressing;grooming;toileting  -PG       Row Name 11/28/23 1313          Bathing Assessment/Intervention    [place] Level (Bathing) bathing skills;moderate assist (50% patient effort)  -PG        Row Name 11/28/23 1313          Upper Body Dressing Assessment/Training    [place] Level (Upper Body Dressing) upper body dressing skills  -PG       Row Name 11/28/23 1313          Lower Body Dressing Assessment/Training    [place] Level (Lower Body Dressing) lower body dressing skills;dependent (less than 25% patient effort)  -PG       Row Name 11/28/23 1313          Grooming Assessment/Training    [place] Level (Grooming) grooming skills;set up  -PG       Row Name 11/28/23 1313          Toileting Assessment/Training    [place] Level (Toileting) toileting skills;dependent (less than 25% patient effort)  -PG               User Key  (r) = Recorded By, (t) = Taken By, (c) = Cosigned By      Initials Name Provider Type    PG Laron Mosqueda OT Occupational Therapist                   Obj/Interventions       Row Name 11/28/23 1314          Vision Assessment/Intervention    Visual Impairment/Limitations WFL;corrective lenses full-time  -PG       Row Name 11/28/23 1314          Range of Motion Comprehensive    General Range of Motion no range of motion deficits identified  -PG       Row Name 11/28/23 1314          Strength Comprehensive (MMT)    Comment, General Manual Muscle Testing (MMT) Assessment 4 -/5 bilateral upper extremities  -PG       Row Name 11/28/23 1314          Motor Skills    Motor Skills coordination;functional endurance  -PG     Coordination WFL  -PG     Functional Endurance Poor  -PG               User Key  (r) = Recorded By, (t) = Taken By, (c) = Cosigned By      Initials Name Provider Type    PG Laron Mosqueda OT Occupational Therapist                   Goals/Plan       Row Name 11/28/23 1315          Transfer Goal 1 (OT)    Activity/Assistive Device (Transfer Goal 1, OT) transfers, all  -PG     [place] Level/Cues Needed (Transfer Goal 1, OT) modified independence  -PG     Time Frame (Transfer Goal 1, OT) long term goal (LTG);10 days  -PG       Row Name 11/28/23 131           Bathing Goal 1 (OT)    Activity/Device (Bathing Goal 1, OT) bathing skills, all  -PG     [place] Level/Cues Needed (Bathing Goal 1, OT) modified independence  -PG     Time Frame (Bathing Goal 1, OT) long term goal (LTG);10 days  -PG       Row Name 11/28/23 1315          Dressing Goal 1 (OT)    Activity/Device (Dressing Goal 1, OT) dressing skills, all  -PG     [place]/Cues Needed (Dressing Goal 1, OT) modified independence  -PG     Time Frame (Dressing Goal 1, OT) long term goal (LTG);10 days  -PG       Row Name 11/28/23 1315          Toileting Goal 1 (OT)    Activity/Device (Toileting Goal 1, OT) toileting skills, all  -PG     [place] Level/Cues Needed (Toileting Goal 1, OT) modified independence  -PG     Time Frame (Toileting Goal 1, OT) long term goal (LTG);10 days  -PG       Row Name 11/28/23 1315          Grooming Goal 1 (OT)    Activity/Device (Grooming Goal 1, OT) grooming skills, all  -PG     [place] (Grooming Goal 1, OT) modified independence  -PG     Time Frame (Grooming Goal 1, OT) long term goal (LTG);10 days  -PG       Row Name 11/28/23 1315          Strength Goal 1 (OT)    Strength Goal 1 (OT) Patient will improve bilateral upper extremity strength to 4+/5 to support ADL independence  -PG     Time Frame (Strength Goal 1, OT) long term goal (LTG);10 days  -PG       Row Name 11/28/23 1315          Problem Specific Goal 1 (OT)    Problem Specific Goal 1 (OT) Patient will improve activity tolerance to fair plus to support engagement and safety with ADL activities  -PG     Time Frame (Problem Specific Goal 1, OT) long term goal (LTG);10 days  -PG       Row Name 11/28/23 1315          Therapy Assessment/Plan (OT)    Planned Therapy Interventions (OT) activity tolerance training;BADL retraining;strengthening exercise;transfer/mobility retraining;patient/caregiver education/training;occupation/activity based interventions  -PG               User Key  (r) = Recorded By, (t) = Taken By, (c) =  Cosigned By      Initials Name Provider Type    PG Laron Mosqueda, MARCELLE Occupational Therapist                   Clinical Impression       Row Name 11/28/23 1314          Pain Assessment    Pretreatment Pain Rating 0/10 - no pain  -PG     Posttreatment Pain Rating 0/10 - no pain  -PG       Row Name 11/28/23 1314          Plan of Care Review    Plan of Care Reviewed With patient  -PG     Progress no change  -PG     Outcome Evaluation Patient presents with limitations affecting strength, activity tolerance, and balance impacting patient's ability to return home safely and independently.  The skills of a therapist will be required to safely and effectively implement the following treatment plan to restore maximal level of function  -PG       Row Name 11/28/23 1314          Therapy Assessment/Plan (OT)    Patient/Family Therapy Goal Statement (OT) Get stronger and return home independently  -PG     Rehab Potential (OT) good, to achieve stated therapy goals  -PG     Criteria for Skilled Therapeutic Interventions Met (OT) yes;meets criteria;skilled treatment is necessary  -PG     Therapy Frequency (OT) 5 times/wk  -PG       Row Name 11/28/23 1314          Therapy Plan Review/Discharge Plan (OT)    Anticipated Discharge Disposition (OT) skilled nursing facility;inpatient rehabilitation facility  -PG               User Key  (r) = Recorded By, (t) = Taken By, (c) = Cosigned By      Initials Name Provider Type    PG Laron Mosqueda OT Occupational Therapist                   Outcome Measures       Row Name 11/28/23 1316          How much help from another is currently needed...    Putting on and taking off regular lower body clothing? 1  -PG     Bathing (including washing, rinsing, and drying) 2  -PG     Toileting (which includes using toilet bed pan or urinal) 1  -PG     Putting on and taking off regular upper body clothing 3  -PG     Taking care of personal grooming (such as brushing teeth) 3  -PG     Eating meals 4  -PG      AM-PAC 6 Clicks Score (OT) 14  -PG       Row Name 11/28/23 1200 11/28/23 0839       How much help from another person do you currently need...    Turning from your back to your side while in flat bed without using bedrails? 3  -CS 3  -BB    Moving from lying on back to sitting on the side of a flat bed without bedrails? 3  -CS 3  -BB    Moving to and from a bed to a chair (including a wheelchair)? 3  -CS 3  -BB    Standing up from a chair using your arms (e.g., wheelchair, bedside chair)? 3  -CS 3  -BB    Climbing 3-5 steps with a railing? 2  -CS 2  -BB    To walk in hospital room? 2  -CS 2  -BB    AM-PAC 6 Clicks Score (PT) 16  -CS 16  -BB    Highest Level of Mobility Goal 5 --> Static standing  -CS 5 --> Static standing  -BB      Row Name 11/28/23 1316 11/28/23 1200       Functional Assessment    Outcome Measure Options AM-PAC 6 Clicks Daily Activity (OT);Optimal Instrument  -PG AM-PAC 6 Clicks Basic Mobility (PT)  -CS      Row Name 11/28/23 1316          Optimal Instrument    Optimal Instrument Optimal - 3  -PG     Bending/Stooping 4  -PG     Standing 3  -PG     Reaching 2  -PG     From the list, choose the 3 activities you would most like to be able to do without any difficulty Bending/stooping;Standing;Reaching  -PG     Total Score Optimal - 3 9  -PG               User Key  (r) = Recorded By, (t) = Taken By, (c) = Cosigned By      Initials Name Provider Type    PG Laron Mosqueda, MARCELLE Occupational Therapist    Porsche Majano, PT Physical Therapist    BB Laura Matson, RN Registered Nurse                    Occupational Therapy Education       Title: PT OT SLP Therapies (Done)       Topic: Occupational Therapy (Done)       Point: ADL training (Done)       Description:   Instruct learner(s) on proper safety adaptation and remediation techniques during self care or transfers.   Instruct in proper use of assistive devices.                  Learning Progress Summary             Patient Acceptance, E,D, VU  by PG at 11/28/2023 1316                         Point: Home exercise program (Done)       Description:   Instruct learner(s) on appropriate technique for monitoring, assisting and/or progressing therapeutic exercises/activities.                  Learning Progress Summary             Patient Acceptance, E,D, VU by PG at 11/28/2023 1316                         Point: Precautions (Done)       Description:   Instruct learner(s) on prescribed precautions during self-care and functional transfers.                  Learning Progress Summary             Patient Acceptance, E,D, VU by PG at 11/28/2023 1316                         Point: Body mechanics (Done)       Description:   Instruct learner(s) on proper positioning and spine alignment during self-care, functional mobility activities and/or exercises.                  Learning Progress Summary             Patient Acceptance, E,D, VU by PG at 11/28/2023 1316                                         User Key       Initials Effective Dates Name Provider Type Discipline    PG 06/16/21 -  Laron Mosqueda OT Occupational Therapist OT                  OT Recommendation and Plan  Planned Therapy Interventions (OT): activity tolerance training, BADL retraining, strengthening exercise, transfer/mobility retraining, patient/caregiver education/training, occupation/activity based interventions  Therapy Frequency (OT): 5 times/wk  Plan of Care Review  Plan of Care Reviewed With: patient  Progress: no change  Outcome Evaluation: Patient presents with limitations affecting strength, activity tolerance, and balance impacting patient's ability to return home safely and independently.  The skills of a therapist will be required to safely and effectively implement the following treatment plan to restore maximal level of function     Time Calculation:   Evaluation Complexity (OT)  Review Occupational Profile/Medical/Therapy History Complexity: brief/low complexity  Assessment, Occupational  Performance/Identification of Deficit Complexity: 3-5 performance deficits  Clinical Decision Making Complexity (OT): problem focused assessment/low complexity  Overall Complexity of Evaluation (OT): low complexity     Time Calculation- OT       Row Name 11/28/23 1317             Time Calculation- OT    OT Start Time 1317  -PG      OT Received On 11/28/23  -PG      OT Goal Re-Cert Due Date 12/07/23  -PG         Untimed Charges    OT Eval/Re-eval Minutes 30  -PG         Total Minutes    Untimed Charges Total Minutes 30  -PG       Total Minutes 30  -PG                User Key  (r) = Recorded By, (t) = Taken By, (c) = Cosigned By      Initials Name Provider Type    PG Laron Mosqueda, MARCELLE Occupational Therapist                  Therapy Charges for Today       Code Description Service Date Service Provider Modifiers Qty    [id number] HC OT EVAL LOW COMPLEXITY 2 11/28/2023 Laron Mosqueda OT GO 1                 Laron Mosqueda OT  11/28/2023

## 2023-11-28 NOTE — CONSULTS
11/28/23 1047   Spiritual Care   Use of Spiritual Resources spirituality for coping, indicated strong use of  (pt stated she is L[employer]-day)   Spiritual Care Source  initiative   Spiritual Care Follow-Up follow-up planned regularly for general support   Response to Spiritual Care emotion expressed;engaged in conversation;receptive of support;thanks expressed   Spiritual Care Interventions supportive conversation provided   Spiritual Care Visit Type follow-up   Spiritual Care Request coping/stress of illness support;decision-making support;family support;prayer support;spiritual/moral support   Receptivity to Spiritual Care visit welcomed     Pt shared that she is to have a family meeting at 1:30 today to discuss goals of care with her and her family. Pt asked that  keep her in their prayers.

## 2023-11-28 NOTE — CONSULTS
Cardiology Consult Note  [address]          Patient Identification:  Charlette Rai      [id number]  88 y.o.        female  1935           Reason for Consultation: CHF    PCP: Rafael Lyons MD    History of Present Illness:     Avoid patient is a 88-year-old with a prior history of COPD, chronic renal insufficiency stage III, diabetes type 2,  Lower extremity peripheral vascular disease, dyslipidemia, essential hypertension has been having worsening shortness of breath issues admissions to the hospital for CHF over the last month especially.  This time she came in with recurrent shortness of breath issues as well as some alteration in her mental status.  She reports increasing shortness of breath PND orthopnea as well as edema issues.  She has not had any anginal chest pain fever chills or cough.  She was recently seen in the hospital at the end of October for respiratory failure requiring intubation    Past History:  Past Medical History:   Diagnosis Date    Allergic rhinitis     Anemia     Arthritis     Asthma     USE INHALERS AND NEBULIZERS    Back pain     Bladder disorder     Cancer     LEFT LUNG CANCER 2005 SURGERY AND CHEMO DONE  AND CURRENTLY 4/ 14/22  RIGHT LUNG CANCER HAS ONLY RECEIVED RADIATION THUS FAR    Chronic kidney disease     stage 3    CKD (chronic kidney disease) stage 4, GFR 15-29 ml/min     Condition not found     ulcer    Congestive heart failure (CHF)     FOLLOWED BY DR AQUINO. DENIES CP BUT DOES HAVE SOA CHRONIC ISSUE COPD/LUNG CANCER    COPD (chronic obstructive pulmonary disease)     FOLLOWED BY DR MARIAJOSE BAILEY    Coronary artery disease     DENIES CP BUT DOES GET SOA MOST OF THE TIME WITH EXERTION BUT OCC AT REST CHRONIC ISSUE COPD/LUNG CANCER    Deep vein thrombosis     Diabetes mellitus     DOES NOT CHECK BS DAILY    Disease of thyroid gland     HYPOTHYROIDISM    Essential hypertension     Gastric ulcer     GERD (gastroesophageal reflux disease)     Heart murmur      History of transfusion     NO ISSUES POST TRANSFUSION WAS MANY YEARS AGO    Hyperlipemia     Leukocytopenia     FOLLOWED BY DR MIR BAILEY    Limb swelling     Lumbago     Lumbar spinal stenosis     Lung cancer     Migraine headache     Multiple joint pain     On home oxygen therapy     3L/NC PRN    Osteopenia     Reflux esophagitis     Shortness of breath     Thyroid nodule     HAS ULTRASOUND YEARLY BEING MONITORED    Vascular disease     Vitamin D deficiency      Past Surgical History:   Procedure Laterality Date    ABDOMINAL SURGERY      APPENDECTOMY      ARTERIOVENOUS FISTULA/SHUNT SURGERY Left 05/10/2022    Procedure: Left basilic vein transposition;  Surgeon: Wan Barksdale MD;  Location: [facility] MAIN OR;  Service: Vascular;  Laterality: Left;    ARTERIOVENOUS FISTULA/SHUNT SURGERY Left 3/23/2023    Procedure: Ligation of left arm arteriovenous fistula;  Surgeon: Wan Barksdale MD;  Location: [facility] MAIN OR;  Service: Vascular;  Laterality: Left;    CARDIAC CATHETERIZATION      CARDIAC SURGERY      CARDIAC SURGERY      fluid drained from heart    CATARACT EXTRACTION, BILATERAL      COLONOSCOPY      ENDOSCOPY      2019    FEMORAL ARTERY STENT Bilateral     HYSTERECTOMY      LUNG BIOPSY Left     lobectomy upper lung caner    LUNG VOLUME REDUCTION      OTHER SURGICAL HISTORY      artifical joints/limbs    REPLACEMENT TOTAL KNEE Left     UPPER GASTROINTESTINAL ENDOSCOPY       No Known Allergies  Social History     Socioeconomic History    Marital status:    Tobacco Use    Smoking status: Former     Packs/day: 1.00     Years: 30.00     Additional pack years: 0.00     Total pack years: 30.00     Types: Cigarettes     Start date:      Quit date: 2004     Years since quittin.4    Smokeless tobacco: Never   Vaping Use    Vaping Use: Never used   Substance and Sexual Activity    Alcohol use: Never    Drug use: Never    Sexual activity: Defer     Family History   Problem  Relation Age of Onset    Arthritis Mother     Arthritis Father     Cancer Brother     Diabetes Brother     Other Brother         blood disease     Prostate cancer Brother     Cancer Brother     Prostate cancer Brother     Cancer Brother     Cancer Brother     Malig Hyperthermia Neg Hx     Colon cancer Neg Hx        Medications:  Prior to Admission medications    Medication Sig Start Date End Date Taking? Authorizing Provider   albuterol sulfate  (90 Base) MCG/ACT inhaler Inhale 2 puffs Every 4 (Four) Hours As Needed for Wheezing.    ProviderSarah MD   amLODIPine (NORVASC) 10 MG tablet Take 1 tablet by mouth Daily. 12/13/22   León Sanchez MD   aspirin 81 MG chewable tablet Chew 1 tablet Daily.    ProviderSarah MD   carvedilol (Coreg) 25 MG tablet Take 1 tablet by mouth 2 (Two) Times a Day With Meals for 30 days. 11/5/23 12/5/23  Patrick Turcios MD   cilostazol (PLETAL) 100 MG tablet Take 1 tablet by mouth 2 (two) times a day. 4/17/21   Sarah Parker MD   dexlansoprazole (DEXILANT) 60 MG capsule Take 1 capsule by mouth Daily. 9/23/22   Sarah Parker MD   ergocalciferol (ERGOCALCIFEROL) 1.25 MG (79549 UT) capsule Take 1 capsule by mouth Every 14 (Fourteen) Days. 3/4/22   Emergency, Nurse Epic, RN   furosemide (Lasix) 80 MG tablet Take 1 tablet by mouth Daily for 30 days. 11/14/23 12/14/23  Rafael Lyons MD   hydrALAZINE (APRESOLINE) 25 MG tablet Take 1 tablet by mouth 2 (Two) Times a Day for 30 days. 11/5/23 12/5/23  Patrick Turcios MD   levocetirizine (XYZAL) 5 MG tablet TAKE 1 TABLET DAILY  Patient taking differently: Take 1 tablet by mouth Every Evening. 6/23/23   Paloma James MD   linagliptin (Tradjenta) 5 MG tablet tablet Take 1 tablet by mouth Daily. 1/10/22   Paloma James MD   nitroglycerin (NITROSTAT) 0.4 MG SL tablet Place 1 tablet under the tongue Every 5 (Five) Minutes As Needed for Chest Pain.    ProviderSarah MD   polycarbophil  (calcium polycarbophil) 625 MG tablet tablet Take 1 tablet by mouth Daily.    Sarah Parker MD   rosuvastatin (CRESTOR) 20 MG tablet Take 1 tablet by mouth Daily. 7/7/23   León Sanchez MD   sennosides-docusate (Senokot S) 8.6-50 MG per tablet Take 1 tablet by mouth Every Night for 30 days. 11/14/23 12/14/23  Rafael Lyons MD   sevelamer (RENVELA) 800 MG tablet Take 1 tablet by mouth 3 (Three) Times a Day With Meals. 10/9/23   Sarah Parker MD   Symbicort 160-4.5 MCG/ACT inhaler Inhale 2 puffs 2 (two) times a day. 5/17/21   Sarah Parker MD   traMADol (ULTRAM) 50 MG tablet Take 1 tablet by mouth Every 8 (Eight) Hours As Needed. 11/22/23   Sarah Parker MD      Current medications:  bumetanide, 3 mg, Intravenous, Q8H  carvedilol, 25 mg, Oral, BID With Meals  cetirizine, 10 mg, Oral, Daily  cilostazol, 100 mg, Oral, BID  enoxaparin, 30 mg, Subcutaneous, Daily  epoetin mita/mita-epbx, 20,000 Units, Intravenous, Weekly  hydrALAZINE, 25 mg, Oral, BID  ipratropium-albuterol, 3 mL, Nebulization, Q4H - RT  linagliptin, 5 mg, Oral, Daily  methylPREDNISolone sodium succinate, 40 mg, Intravenous, Q8H  metOLazone, 10 mg, Oral, Daily  pantoprazole, 40 mg, Oral, BID AC  rosuvastatin, 20 mg, Oral, Nightly  sennosides-docusate, 1 tablet, Oral, Nightly  sevelamer, 800 mg, Oral, TID With Meals  sodium bicarbonate, 650 mg, Oral, BID  sodium chloride, 10 mL, Intravenous, Q12H      Current IV drips:  Pharmacy to Dose enoxaparin (LOVENOX),         Review of Systems   Constitutional: Negative for chills, fever and weight loss.   HENT:  Negative for congestion and nosebleeds.    Cardiovascular:  Positive for leg swelling. Negative for orthopnea and paroxysmal nocturnal dyspnea.   Respiratory:  Positive for shortness of breath. Negative for cough.    Endocrine: Negative for cold intolerance and heat intolerance.   Skin:  Negative for rash.   Musculoskeletal:  Negative for back pain and muscle weakness.  "  Gastrointestinal:  Negative for abdominal pain, nausea and vomiting.   Genitourinary:  Negative for dysuria and nocturia.   Neurological:  Negative for dizziness and light-headedness.   Psychiatric/Behavioral:  Positive for altered mental status. Negative for hallucinations.          Physical Exam    /57 (BP Location: Right arm, Patient Position: Lying)   Pulse 96   Temp 97.7 °F (36.5 °C) (Oral)   Resp 20   Ht 165.1 cm (65\")   Wt 74.6 kg (164 lb 7.4 oz)   LMP  (LMP Unknown)   SpO2 97%   BMI 27.37 kg/m²  Body mass index is 27.37 kg/m².   Oxygen saturation   @FLOWAN(10::1)@ SpO2  Min: 92 %  Max: 98 %    General Appearance:   no acute distress  Alert and oriented x3  HENT:   lips not cyanotic  Atraumatic  Neck:  thyroid not enlarged  supple  Respiratory:  no respiratory distress  normal breath sounds  no rales  Cardiovascular:  Positive jugular venous distention  regular rhythm  apical impulse normal  S1 normal, S2 normal  no S3, no S4   2/6 systolic  no rub, no thrill  no carotid bruit  pedal pulses normal  lower extremity edema: +1  Gastrointestinal:   bowel sounds normal  non-tender  no hepatomegaly, no splenomegaly  Musculoskeletal:  no clubbing of fingers.   normocephalic, head atraumatic  Skin:   warm, dry  No rashes  Neuro/Psychiatric:  normal mood and affect  judgement and insight appropriate      Cardiographics:     ECG  (personally reviewed)   -   Results for orders placed during the hospital encounter of 10/29/23    Adult Transthoracic Echo Complete W/ Cont if Necessary Per Protocol    Interpretation Summary    Left ventricular systolic function is normal. Calculated left ventricular EF = 56.8%    Left ventricular wall thickness is consistent with mild asymmetric hypertrophy.    Left ventricular diastolic function is consistent with (grade I) impaired relaxation.    Left atrial volume is moderately increased.    Moderate aortic valve stenosis is present.    Estimated right ventricular " "systolic pressure from tricuspid regurgitation is normal (<35 mmHg).    Mild dilation of the aortic root is present.         No results found for this or any previous visit.     Cardiolite (Tc-99m Sestamibi) stress test                  Lab Review:       CBC          11/26/2023    03:40 11/27/2023    04:28 11/28/2023    04:15   CBC   WBC 4.27  5.63  6.44    RBC 2.64  2.57  2.65    Hemoglobin 7.8  7.5  7.8    Hematocrit 25.8  25.8  27.0    MCV 97.7  100.4  101.9    MCH 29.5  29.2  29.4    MCHC 30.2  29.1  28.9    RDW 13.2  13.2  13.2    Platelets 194  214  206        CMP          11/26/2023    03:40 11/27/2023    04:28 11/27/2023    17:00 11/28/2023    04:15   CMP   Glucose 175  210  175  182    BUN 88  99   103    Creatinine 2.80  3.21   3.25    EGFR 16.0  13.6   13.4    Sodium 139  136  137.6  135    Potassium 5.6  5.9   5.5    Chloride 107  103   102    Calcium 8.3  7.8   7.7    Total Protein  6.1      Albumin  3.2   3.2    Globulin  2.9      Total Bilirubin  <0.2      Alkaline Phosphatase  157      AST (SGOT)  9      ALT (SGPT)  9      Albumin/Globulin Ratio  1.1      BUN/Creatinine Ratio 31.4  30.8   31.7    Anion Gap 13.9  14.1   14.6         CARDIAC LABS:      Lab 11/25/23  1541 11/25/23  1313   PROBNP  --  7,806.0*   HSTROP T 81* 83*      No results found for: \"DIGOXIN\"   Lab Results   Component Value Date    TSH 1.650 10/29/2023           Invalid input(s): \"LDLCALC\"  No results found for: \"POCTROP\"  No components found for: \"DDIMERQUAN\"  Lab Results   Component Value Date    MG 1.7 11/14/2023             CARDIAC LABS:      Lab 11/25/23  1541 11/25/23  1313   PROBNP  --  7,806.0*   HSTROP T 81* 83*        Imaging:  CXR    Impression: Slight increasing right interstitial opacities which could reflect edema. More conspicuous blunting of right costophrenic angle, a trace effusion is possible.         Assessment:    Acute exacerbation of CHF (congestive heart failure)    Essential hypertension    Stage 4 chronic " kidney disease    Type 2 diabetes mellitus without complication    GERD (gastroesophageal reflux disease)    Acute hypoxic respiratory failure    COPD exacerbation    Hypoxia      Acute on chronic diastolic congestive heart failure with worsening evidence of volume retention on high intensity diuretics Bumex now 3 mg every 8 hours with dose of metolazone as well still with difficulty mobilizing fluids at this point With evidence of worsening renal function.  Family to discuss the possibility of whether or not dialysis would want to be pursued this afternoon    Plan:  1.  Continue with IV Bumex 3  mg three times daily metolazone  2.  Patient and family to have a discussion about whether or not they want to consider hemodialysis    Thank you for allowing us to share in [facility].            León Sanchez MD   11/28/2023    08:10 EST

## 2023-11-28 NOTE — THERAPY EVALUATION
Acute Care - Physical Therapy Initial Evaluation  MAURICE Escamilla     Patient Name: Charlette Rai  : 1935  MRN: [id number]  Today's Date: 2023      Visit Dx:     ICD-10-CM ICD-9-CM   1. Acute exacerbation of chronic obstructive pulmonary disease (COPD)  J44.1 491.21   2. Hypoxia  R09.02 799.02   3. Difficulty walking  R26.2 719.7     Patient Active Problem List   Diagnosis    Malignant neoplasm of upper lobe of right lung    Allergic rhinitis    Rheumatoid arthritis    Asthma    Chronic obstructive pulmonary disease    Acute exacerbation of CHF (congestive heart failure)    Essential hypertension    GERD (gastroesophageal reflux disease)    Hyperlipidemia LDL goal <70    Lumbar spinal stenosis    Migraine    Monoclonal paraproteinemia    Osteopenia    Oxygen dependent    Peripheral neuropathy    Stage 4 chronic kidney disease    Type 2 diabetes mellitus without complication    Vitamin D deficiency    Carotid artery stenosis    Chronic right-sided thoracic back pain    Acute pain of left shoulder    Peripheral vascular disease of lower extremity    Edema    Ex-smoker    Peripheral vascular disease    De Quervain's tenosynovitis    Arthritis of carpometacarpal (CMC) joint of right thumb    Arthritis of right hand    Steal syndrome of dialysis vascular access    Anemia of chronic renal failure, stage 4 (severe)    Acute hypoxic respiratory failure    Anemia of chronic disease    COPD exacerbation    Hypoxia    Aortic stenosis, moderate     Past Medical History:   Diagnosis Date    Allergic rhinitis     Anemia     Arthritis     Asthma     USE INHALERS AND NEBULIZERS    Back pain     Bladder disorder     Cancer     LEFT LUNG CANCER  SURGERY AND CHEMO DONE  AND CURRENTLY   RIGHT LUNG CANCER HAS ONLY RECEIVED RADIATION THUS FAR    Chronic kidney disease     stage 3    CKD (chronic kidney disease) stage 4, GFR 15-29 ml/min     Condition not found     ulcer    Congestive heart failure (CHF)     FOLLOWED  BY DR AQUINO. DENIES CP BUT DOES HAVE SOA CHRONIC ISSUE COPD/LUNG CANCER    COPD (chronic obstructive pulmonary disease)     FOLLOWED BY DR MARIAJOSE BAILEY    Coronary artery disease     DENIES CP BUT DOES GET SOA MOST OF THE TIME WITH EXERTION BUT OCC AT REST CHRONIC ISSUE COPD/LUNG CANCER    Deep vein thrombosis     Diabetes mellitus     DOES NOT CHECK BS DAILY    Disease of thyroid gland     HYPOTHYROIDISM    Essential hypertension     Gastric ulcer     GERD (gastroesophageal reflux disease)     Heart murmur     History of transfusion     NO ISSUES POST TRANSFUSION WAS MANY YEARS AGO    Hyperlipemia     Leukocytopenia     FOLLOWED BY DR MIR BAILEY    Limb swelling     Lumbago     Lumbar spinal stenosis     Lung cancer     Migraine headache     Multiple joint pain     On home oxygen therapy     3L/NC PRN    Osteopenia     Reflux esophagitis     Shortness of breath     Thyroid nodule     HAS ULTRASOUND YEARLY BEING MONITORED    Vascular disease     Vitamin D deficiency      Past Surgical History:   Procedure Laterality Date    ABDOMINAL SURGERY      APPENDECTOMY      ARTERIOVENOUS FISTULA/SHUNT SURGERY Left 05/10/2022    Procedure: Left basilic vein transposition;  Surgeon: Wan Barksdale MD;  Location: [facility], [place];  Service: Vascular;  Laterality: Left;    ARTERIOVENOUS FISTULA/SHUNT SURGERY Left 3/23/2023    Procedure: Ligation of left arm arteriovenous fistula;  Surgeon: Wan Barksdale MD;  Location: [facility] OR;  Service: Vascular;  Laterality: Left;    CARDIAC CATHETERIZATION  1996    CARDIAC SURGERY      CARDIAC SURGERY      fluid drained from heart    CATARACT EXTRACTION, BILATERAL  2003    COLONOSCOPY  2014    ENDOSCOPY  2016    2019    FEMORAL ARTERY STENT Bilateral     HYSTERECTOMY      LUNG BIOPSY Left 2005    lobectomy upper lung caner    LUNG VOLUME REDUCTION      OTHER SURGICAL HISTORY      artifical joints/limbs    REPLACEMENT TOTAL KNEE Left 2016    UPPER GASTROINTESTINAL ENDOSCOPY       PT  Assessment (last 12 hours)       PT Evaluation and Treatment       Row Name 11/28/23 1200          Physical Therapy Time and Intention    Subjective Information complains of;fatigue;dyspnea  -CS     Document Type evaluation  -CS     Mode of Treatment individual therapy;physical therapy  -CS     Patient Effort good  -CS     Symptoms Noted During/After Treatment fatigue;shortness of breath  -CS       Row Name 11/28/23 1200          General Information    Patient Profile Reviewed yes  -CS     Patient Observations alert;cooperative;agree to therapy  -CS     Prior Level of Function independent:;all household mobility;gait;transfer;bed mobility;ADL's  -CS     Equipment Currently Used at Home cane, quad;oxygen;raised toilet;shower chair;walker, rolling  -CS     Barriers to Rehab none identified  -CS       Row Name 11/28/23 1200          Living Environment    Current Living Arrangements home  -CS     Home Accessibility stairs to enter home;stairs within home  -CS     People in Home spouse  daughter lives close by  -CS     Primary Care Provided by self;spouse/significant other  -CS       Row Name 11/28/23 1200          Home Main Entrance    Number of Stairs, Main Entrance three  -CS     Stair Railings, Main Entrance railings safe and in good condition  -CS       Row Name 11/28/23 1200          Stairs Within Home, Primary    Stairs, Within Home, Primary Non-essential steps up to the second level and then a set to the basement. She stays on the main level.  -CS     Stair Railings, Within Home, Primary railings safe and in good condition  -CS       Row Name 11/28/23 1200          Pain    Pretreatment Pain Rating 0/10 - no pain  -CS     Posttreatment Pain Rating 0/10 - no pain  -CS       Row Name 11/28/23 1200          Cognition    Orientation Status (Cognition) oriented x 3  -CS       Row Name 11/28/23 1200          Range of Motion Comprehensive    General Range of Motion bilateral lower extremity ROM WFL  -CS       Row Name  11/28/23 1200          Strength Comprehensive (MMT)    General Manual Muscle Testing (MMT) Assessment lower extremity strength deficits identified  -CS     Comment, General Manual Muscle Testing (MMT) Assessment BLEs assessed at 3/5  -       Row Name 11/28/23 1200          Bed Mobility    Bed Mobility bed mobility (all) activities  -     All Activities, [place] (Bed Mobility) verbal cues;minimum assist (75% patient effort)  -     Bed Mobility, Safety Issues decreased use of legs for bridging/pushing;decreased use of arms for pushing/pulling  -     Assistive Device (Bed Mobility) bed rails;head of bed elevated  -       Row Name 11/28/23 1200          Transfers    Transfers sit-stand transfer;stand-sit transfer;toilet transfer  -       Row Name 11/28/23 1200          Sit-Stand Transfer    Sit-Stand Cru[place] (Transfers) verbal cues;minimum assist (75% patient effort)  -     Assistive Device (Sit-Stand Transfers) walker, front-wheeled  -       Row Name 11/28/23 1200          Stand-Sit Transfer    Stand-Sit Cru[place] (Transfers) verbal cues;minimum assist (75% patient effort)  -     Assistive Device (Stand-Sit Transfers) walker, front-wheeled  -       Row Name 11/28/23 1200          Toilet Transfer    Type (Toilet Transfer) sit-stand;stand-sit;stand pivot/stand step  -     [place] Level (Toilet Transfer) verbal cues;minimum assist (75% patient effort);moderate assist (50% patient effort)  -     Assistive Device (Toilet Transfer) commode, bedside without drop arms;grab bars/safety frame  -       Row Name 11/28/23 1200          Gait/Stairs (Locomotion)    Gait/Stairs Locomotion gait/ambulation assistive device  -     [place] Level (Gait) verbal cues;minimum assist (75% patient effort);moderate assist (50% patient effort)  -     Assistive Device (Gait) walker, front-wheeled  -     Distance in Feet (Gait) 4  -CS     Pattern (Gait) step-to  -CS     Deviations/Abnormal  Patterns (Gait) base of support, narrow;festinating/shuffling;gait speed decreased;stride length decreased  -CS     Bilateral Gait Deviations forward flexed posture  -CS       Row Name 11/28/23 1200          Safety Issues, Functional Mobility    Impairments Affecting Function (Mobility) balance;endurance/activity tolerance;strength;shortness of breath;pain  -CS       Row Name 11/28/23 1200          Balance    Balance Assessment standing dynamic balance  -CS     Dynamic Standing Balance verbal cues;minimal assist;moderate assist  -CS     Position/Device Used, Standing Balance supported  -CS       Row Name 11/28/23 1200          Plan of Care Review    Plan of Care Reviewed With patient  -CS     Progress no change  -CS     Outcome Evaluation Pt presents with limitations that impede their ability to safely and independently transfer and ambulate. The skills of a therapist will be required to safely and effectively implement the following treatment plan to restore maximal level of function.  -       Row Name 11/28/23 1200          Therapy Assessment/Plan (PT)    Rehab Potential (PT) fair, will monitor progress closely  -CS     Criteria for Skilled Interventions Met (PT) yes;skilled treatment is necessary  -CS     Therapy Frequency (PT) daily  -CS     Predicted Duration of Therapy Intervention (PT) 10 days  -CS     Problem List (PT) problems related to;balance;mobility;strength;pain  endurance and shotness of breath  -CS     Activity Limitations Related to Problem List (PT) unable to ambulate safely;unable to transfer safely  -CS       Row Name 11/28/23 1200          PT Evaluation Complexity    History, PT Evaluation Complexity 1-2 personal factors and/or comorbidities  -CS     Examination of Body Systems (PT Eval Complexity) total of 4 or more elements  -CS     Clinical Presentation (PT Evaluation Complexity) stable  -CS     Clinical Decision Making (PT Evaluation Complexity) low complexity  -CS     Overall Complexity  (PT Evaluation Complexity) low complexity  -CS       Row Name 11/28/23 1200          Therapy Plan Review/Discharge Plan (PT)    Therapy Plan Review (PT) evaluation/treatment results reviewed;patient  -CS       Row Name 11/28/23 1200          Physical Therapy Goals    Bed Mobility Goal Selection (PT) bed mobility, PT goal 1  -CS     Transfer Goal Selection (PT) transfer, PT goal 1  -CS     Gait Training Goal Selection (PT) gait training, PT goal 1  -       Row Name 11/28/23 1200          Bed Mobility Goal 1 (PT)    Activity/Assistive Device (Bed Mobility Goal 1, PT) bed mobility activities, all  -CS     [place] Level/Cues Needed (Bed Mobility Goal 1, PT) standby assist  -CS     Time Frame (Bed Mobility Goal 1, PT) long term goal (LTG);10 days  -       Row Name 11/28/23 1200          Transfer Goal 1 (PT)    Activity/Assistive Device (Transfer Goal 1, PT) sit-to-stand/stand-to-sit;bed-to-chair/chair-to-bed;walker, rolling  -CS     [place] Level/Cues Needed (Transfer Goal 1, PT) contact guard required  -CS     Time Frame (Transfer Goal 1, PT) long term goal (LTG);10 days  -       Row Name 11/28/23 1200          Gait Training Goal 1 (PT)    Activity/Assistive Device (Gait Training Goal 1, PT) gait (walking locomotion);assistive device use;walker, rolling  -CS     [place] Level (Gait Training Goal 1, PT) contact guard required  -CS     Distance (Gait Training Goal 1, PT) 50  -CS     Time Frame (Gait Training Goal 1, PT) long term goal (LTG);10 days  -               User Key  (r) = Recorded By, (t) = Taken By, (c) = Cosigned By      Initials Name Provider Type    Porsche Majano PT Physical Therapist                    Physical Therapy Education       Title: PT OT SLP Therapies (In Progress)       Topic: Physical Therapy (In Progress)       Point: Mobility training (In Progress)       Learning Progress Summary             Patient Acceptance, E, NR by JENN at 11/28/2023 2298                          Point: Home exercise program (Not Started)       Learner Progress:  Not documented in this visit.              Point: Body mechanics (Not Started)       Learner Progress:  Not documented in this visit.              Point: Precautions (In Progress)       Learning Progress Summary             Patient Acceptance, E, NR by  at 11/28/2023 0002                                         User Key       Initials Effective Dates Name Provider Type Discipline     04/25/21 -  Porsche Avery, PT Physical Therapist PT                  PT Recommendation and Plan  Anticipated Discharge Disposition (PT): home with /7 care, home with home health, sub acute care setting  Planned Therapy Interventions (PT): balance training, bed mobility training, gait training, transfer training, strengthening  Therapy Frequency (PT): daily  Plan of Care Reviewed With: patient  Progress: no change  Outcome Evaluation: Pt presents with limitations that impede their ability to safely and independently transfer and ambulate. The skills of a therapist will be required to safely and effectively implement the following treatment plan to restore maximal level of function.   Outcome Measures       Row Name 11/28/23 1200             How much help from another person do you currently need...    Turning from your back to your side while in flat bed without using bedrails? 3  -CS      Moving from lying on back to sitting on the side of a flat bed without bedrails? 3  -CS      Moving to and from a bed to a chair (including a wheelchair)? 3  -CS      Standing up from a chair using your arms (e.g., wheelchair, bedside chair)? 3  -CS      Climbing 3-5 steps with a railing? 2  -CS      To walk in hospital room? 2  -CS      AM-PAC 6 Clicks Score (PT) 16  -CS      Highest Level of Mobility Goal 5 --> Static standing  -CS         Functional Assessment    Outcome Measure Options AM-PAC 6 Clicks Basic Mobility (PT)  -CS                User Key  (r) = Recorded By, (t)  = Taken By, (c) = Cosigned By      Initials Name Provider Type    CS Porsche Avery, PT Physical Therapist                     Time Calculation:    PT Charges       Row Name 11/28/23 1205             Time Calculation    PT Received On 11/28/23  -CS      PT Goal Re-Cert Due Date 12/07/23  -CS         Untimed Charges    PT Eval/Re-eval Minutes 48  -CS         Total Minutes    Untimed Charges Total Minutes 48  -CS       Total Minutes 48  -CS                User Key  (r) = Recorded By, (t) = Taken By, (c) = Cosigned By      Initials Name Provider Type    CS Porsche Avery, PT Physical Therapist                  Therapy Charges for Today       Code Description Service Date Service Provider Modifiers Qty    44271877201 HC PT EVAL LOW COMPLEXITY 4 11/28/2023 Porsche Avery, PT GP 1            PT G-Codes  Outcome Measure Options: AM-PAC 6 Clicks Daily Activity (OT), Optimal Instrument  AM-PAC 6 Clicks Score (PT): 16  AM-PAC 6 Clicks Score (OT): 14    Porsche Avery PT  11/28/2023

## 2023-11-28 NOTE — PROGRESS NOTES
" LOS: 3 days   Patient Care Team:  Rafael Lyons MD as PCP - General (Internal Medicine)  Regis Mckeon MD as Consulting Physician (Radiation Oncology)  Carlton Casillas MD as Consulting Physician (Gastroenterology)  Susan Marcos APRN as Nurse Practitioner (Gastroenterology)    Chief Complaint: ESRD    Subjective     History of Present Illness  Pt edema better, still some dyspnea  Tolerating po    Subjective:  Symptoms:  She reports shortness of breath.  No chest pain, chest pressure or anxiety.    Diet:  No nausea or vomiting.        History taken from: patient chart    Objective     Vital Sign Min/Max for last 24 hours  Temp  Min: 97.5 °F (36.4 °C)  Max: 97.7 °F (36.5 °C)   BP  Min: 120/52  Max: 157/59   Pulse  Min: 93  Max: 98   Resp  Min: 16  Max: 24   SpO2  Min: 92 %  Max: 98 %   Flow (L/min)  Min: 6  Max: 8   Weight  Min: 74.6 kg (164 lb 7.4 oz)  Max: 74.6 kg (164 lb 7.4 oz)     Flowsheet Rows      Flowsheet Row First Filed Value   Admission Height 165.1 cm (65\") Documented at 11/25/2023 1304   Admission Weight 75.3 kg (166 lb 0.1 oz) Documented at 11/25/2023 1304            No intake/output data recorded.  I/O last 3 completed shifts:  In: 230 [P.O.:230]  Out: 300 [Urine:300]    Objective:  General Appearance:  Comfortable.    Vital signs: (most recent): Blood pressure 144/57, pulse 96, temperature 97.7 °F (36.5 °C), temperature source Oral, resp. rate 20, height 165.1 cm (65\"), weight 74.6 kg (164 lb 7.4 oz), SpO2 97%, not currently breastfeeding.  Vital signs are normal.    HEENT: Normal HEENT exam.    Lungs:  Normal effort and normal respiratory rate.  There are rales.    Heart: Normal rate.    Abdomen: Abdomen is soft.  Bowel sounds are normal.     Extremities: Normal range of motion.  There is dependent edema.    Pulses: Distal pulses are intact.    Pupils:  Pupils are equal, round, and reactive to light.    Skin:  Warm and dry.                Results Review:     I reviewed the patient's " "new clinical results.    WBC WBC   Date Value Ref Range Status   11/28/2023 6.44 3.40 - 10.80 10*3/mm3 Final   11/27/2023 5.63 3.40 - 10.80 10*3/mm3 Final   11/26/2023 4.27 3.40 - 10.80 10*3/mm3 Final   11/25/2023 9.50 3.40 - 10.80 10*3/mm3 Final      HGB Hemoglobin   Date Value Ref Range Status   11/28/2023 7.8 (L) 12.0 - 15.9 g/dL Final   11/27/2023 7.5 (L) 12.0 - 15.9 g/dL Final   11/26/2023 7.8 (L) 12.0 - 15.9 g/dL Final   11/25/2023 8.1 (L) 12.0 - 15.9 g/dL Final      HCT Hematocrit   Date Value Ref Range Status   11/28/2023 27.0 (L) 34.0 - 46.6 % Final   11/27/2023 25.8 (L) 34.0 - 46.6 % Final   11/26/2023 25.8 (L) 34.0 - 46.6 % Final   11/25/2023 26.9 (L) 34.0 - 46.6 % Final      Platlets No results found for: \"LABPLAT\"   MCV MCV   Date Value Ref Range Status   11/28/2023 101.9 (H) 79.0 - 97.0 fL Final   11/27/2023 100.4 (H) 79.0 - 97.0 fL Final   11/26/2023 97.7 (H) 79.0 - 97.0 fL Final   11/25/2023 98.9 (H) 79.0 - 97.0 fL Final          Sodium Sodium   Date Value Ref Range Status   11/28/2023 135 (L) 136 - 145 mmol/L Final   11/27/2023 136 136 - 145 mmol/L Final   11/26/2023 139 136 - 145 mmol/L Final   11/25/2023 140 136 - 145 mmol/L Final     Sodium, Arterial   Date Value Ref Range Status   11/27/2023 137.6 136 - 146 mmol/L Final   11/25/2023 139.4 136 - 146 mmol/L Final      Potassium Potassium   Date Value Ref Range Status   11/28/2023 5.5 (H) 3.5 - 5.2 mmol/L Final   11/27/2023 5.9 (H) 3.5 - 5.2 mmol/L Final   11/26/2023 5.6 (H) 3.5 - 5.2 mmol/L Final   11/25/2023 5.3 (H) 3.5 - 5.2 mmol/L Final      Chloride Chloride   Date Value Ref Range Status   11/28/2023 102 98 - 107 mmol/L Final   11/27/2023 103 98 - 107 mmol/L Final   11/26/2023 107 98 - 107 mmol/L Final   11/25/2023 107 98 - 107 mmol/L Final      CO2 CO2   Date Value Ref Range Status   11/28/2023 18.4 (L) 22.0 - 29.0 mmol/L Final   11/27/2023 18.9 (L) 22.0 - 29.0 mmol/L Final   11/26/2023 18.1 (L) 22.0 - 29.0 mmol/L Final   11/25/2023 20.0 (L) " "22.0 - 29.0 mmol/L Final      BUN BUN   Date Value Ref Range Status   11/28/2023 103 (H) 8 - 23 mg/dL Final   11/27/2023 99 (H) 8 - 23 mg/dL Final   11/26/2023 88 (H) 8 - 23 mg/dL Final   11/25/2023 80 (H) 8 - 23 mg/dL Final      Creatinine Creatinine   Date Value Ref Range Status   11/28/2023 3.25 (H) 0.57 - 1.00 mg/dL Final   11/27/2023 3.21 (H) 0.57 - 1.00 mg/dL Final   11/26/2023 2.80 (H) 0.57 - 1.00 mg/dL Final   11/25/2023 2.92 (H) 0.57 - 1.00 mg/dL Final      Calcium Calcium   Date Value Ref Range Status   11/28/2023 7.7 (L) 8.6 - 10.5 mg/dL Final   11/27/2023 7.8 (L) 8.6 - 10.5 mg/dL Final   11/26/2023 8.3 (L) 8.6 - 10.5 mg/dL Final   11/25/2023 8.4 (L) 8.6 - 10.5 mg/dL Final      PO4 No results found for: \"CAPO4\"   Albumin Albumin   Date Value Ref Range Status   11/28/2023 3.2 (L) 3.5 - 5.2 g/dL Final   11/27/2023 3.2 (L) 3.5 - 5.2 g/dL Final   11/25/2023 3.5 3.5 - 5.2 g/dL Final      Magnesium No results found for: \"MG\"   Uric Acid No results found for: \"URICACID\"     Medication Review:   bumetanide, 4 mg, Intravenous, Q8H  carvedilol, 25 mg, Oral, BID With Meals  cetirizine, 10 mg, Oral, Daily  cilostazol, 100 mg, Oral, BID  epoetin mita/mita-epbx, 20,000 Units, Intravenous, Weekly  [START ON 11/29/2023] heparin (porcine), 5,000 Units, Subcutaneous, Q12H  hydrALAZINE, 25 mg, Oral, BID  ipratropium-albuterol, 3 mL, Nebulization, Q4H - RT  linagliptin, 5 mg, Oral, Daily  pantoprazole, 40 mg, Oral, BID AC  rosuvastatin, 20 mg, Oral, Nightly  sennosides-docusate, 1 tablet, Oral, Nightly  sevelamer, 800 mg, Oral, TID With Meals  sodium bicarbonate, 650 mg, Oral, BID  sodium chloride, 10 mL, Intravenous, Q12H          Assessment & Plan       Acute exacerbation of CHF (congestive heart failure)    Essential hypertension    GERD (gastroesophageal reflux disease)    Stage 4 chronic kidney disease    Type 2 diabetes mellitus without complication    Acute hypoxic respiratory failure    COPD exacerbation    Hypoxia   "  Aortic stenosis, moderate      Assessment & Plan  - Probable new ESRD, progressive renal disease from diabetic nephropathy on a background of hypertension and congestive heart failure and bilateral renal artery stenosis.  Exam compatible was volume overload.  Increase bumex to 4mg q8hrs, on metolazone also.    Will ask vascular to see her for AV graft placement and initiation of dialysis soon.  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  - Hypertension, blood pressure should improve with better volume control.  - Bilateral renal artery stenosis, aldosterone/renin ratio was not elevated.  No intervention.  - Congestive heart failure, diastolic dysfunction, known preserved LV function.  - History of lung cancer.  - Diabetes with complications.  - Anemia, severe.  tsat ok..  Start CASSANDRA, goal hemoglobin above 10.  - Hyperkalemia, secondary to renal failure, combination of diuretics should be helpful, on low potassium diet.  - Metabolic acidosis, mild, should improve with diuresis and initiation of dialysis.  On oral sodium bicarb for now and monitor.    Elliott Aviles MD  11/28/23  11:25 EST

## 2023-11-29 ENCOUNTER — ANESTHESIA EVENT (OUTPATIENT)
Dept: PERIOP | Facility: HOSPITAL | Age: 88
End: 2023-11-29
Payer: MEDICARE

## 2023-11-29 LAB
QT INTERVAL: 376 MS
QTC INTERVAL: 489 MS

## 2023-11-29 PROCEDURE — 94664 DEMO&/EVAL PT USE INHALER: CPT

## 2023-11-29 PROCEDURE — 94799 UNLISTED PULMONARY SVC/PX: CPT

## 2023-11-29 PROCEDURE — 99231 SBSQ HOSP IP/OBS SF/LOW 25: CPT | Performed by: INTERNAL MEDICINE

## 2023-11-29 PROCEDURE — 99232 SBSQ HOSP IP/OBS MODERATE 35: CPT | Performed by: SURGERY

## 2023-11-29 PROCEDURE — 99233 SBSQ HOSP IP/OBS HIGH 50: CPT | Performed by: INTERNAL MEDICINE

## 2023-11-29 PROCEDURE — 25010000002 HEPARIN (PORCINE) PER 1000 UNITS: Performed by: INTERNAL MEDICINE

## 2023-11-29 RX ORDER — METOLAZONE 5 MG/1
10 TABLET ORAL DAILY
Status: DISCONTINUED | OUTPATIENT
Start: 2023-11-29 | End: 2023-11-29

## 2023-11-29 RX ORDER — BUDESONIDE AND FORMOTEROL FUMARATE DIHYDRATE 160; 4.5 UG/1; UG/1
2 AEROSOL RESPIRATORY (INHALATION)
Status: DISCONTINUED | OUTPATIENT
Start: 2023-11-29 | End: 2023-12-05 | Stop reason: HOSPADM

## 2023-11-29 RX ORDER — METOLAZONE 5 MG/1
10 TABLET ORAL ONCE
Status: COMPLETED | OUTPATIENT
Start: 2023-11-29 | End: 2023-11-29

## 2023-11-29 RX ORDER — IPRATROPIUM BROMIDE AND ALBUTEROL SULFATE 2.5; .5 MG/3ML; MG/3ML
3 SOLUTION RESPIRATORY (INHALATION)
Status: DISCONTINUED | OUTPATIENT
Start: 2023-11-29 | End: 2023-12-05 | Stop reason: HOSPADM

## 2023-11-29 RX ADMIN — LINAGLIPTIN 5 MG: 5 TABLET, FILM COATED ORAL at 08:41

## 2023-11-29 RX ADMIN — HEPARIN SODIUM 5000 UNITS: 5000 INJECTION INTRAVENOUS; SUBCUTANEOUS at 20:05

## 2023-11-29 RX ADMIN — Medication 10 ML: at 20:06

## 2023-11-29 RX ADMIN — SEVELAMER CARBONATE 800 MG: 800 TABLET, FILM COATED ORAL at 18:17

## 2023-11-29 RX ADMIN — ROSUVASTATIN 20 MG: 20 TABLET, FILM COATED ORAL at 20:06

## 2023-11-29 RX ADMIN — METOLAZONE 10 MG: 5 TABLET ORAL at 08:41

## 2023-11-29 RX ADMIN — BUMETANIDE 4 MG: 0.25 INJECTION INTRAMUSCULAR; INTRAVENOUS at 03:05

## 2023-11-29 RX ADMIN — ACETAMINOPHEN 650 MG: 325 TABLET ORAL at 16:47

## 2023-11-29 RX ADMIN — METOLAZONE 10 MG: 5 TABLET ORAL at 16:35

## 2023-11-29 RX ADMIN — IPRATROPIUM BROMIDE AND ALBUTEROL SULFATE 3 ML: .5; 3 SOLUTION RESPIRATORY (INHALATION) at 07:40

## 2023-11-29 RX ADMIN — TRAMADOL HYDROCHLORIDE 50 MG: 50 TABLET ORAL at 11:25

## 2023-11-29 RX ADMIN — Medication 10 ML: at 08:43

## 2023-11-29 RX ADMIN — BUDESONIDE AND FORMOTEROL FUMARATE DIHYDRATE 2 PUFF: 160; 4.5 AEROSOL RESPIRATORY (INHALATION) at 20:32

## 2023-11-29 RX ADMIN — SODIUM BICARBONATE 650 MG TABLET 650 MG: at 08:41

## 2023-11-29 RX ADMIN — SEVELAMER CARBONATE 800 MG: 800 TABLET, FILM COATED ORAL at 08:41

## 2023-11-29 RX ADMIN — HEPARIN SODIUM 5000 UNITS: 5000 INJECTION INTRAVENOUS; SUBCUTANEOUS at 08:41

## 2023-11-29 RX ADMIN — CARVEDILOL 25 MG: 12.5 TABLET, FILM COATED ORAL at 18:17

## 2023-11-29 RX ADMIN — CILOSTAZOL 100 MG: 100 TABLET ORAL at 20:06

## 2023-11-29 RX ADMIN — BUMETANIDE 4 MG: 0.25 INJECTION INTRAMUSCULAR; INTRAVENOUS at 18:17

## 2023-11-29 RX ADMIN — PANTOPRAZOLE SODIUM 40 MG: 40 TABLET, DELAYED RELEASE ORAL at 05:40

## 2023-11-29 RX ADMIN — PANTOPRAZOLE SODIUM 40 MG: 40 TABLET, DELAYED RELEASE ORAL at 16:35

## 2023-11-29 RX ADMIN — BUMETANIDE 4 MG: 0.25 INJECTION INTRAMUSCULAR; INTRAVENOUS at 11:15

## 2023-11-29 RX ADMIN — CILOSTAZOL 100 MG: 100 TABLET ORAL at 08:41

## 2023-11-29 RX ADMIN — CETIRIZINE HYDROCHLORIDE 10 MG: 10 TABLET, FILM COATED ORAL at 08:41

## 2023-11-29 RX ADMIN — HYDRALAZINE HYDROCHLORIDE 25 MG: 25 TABLET, FILM COATED ORAL at 20:06

## 2023-11-29 RX ADMIN — IPRATROPIUM BROMIDE AND ALBUTEROL SULFATE 3 ML: .5; 3 SOLUTION RESPIRATORY (INHALATION) at 03:29

## 2023-11-29 RX ADMIN — TRAMADOL HYDROCHLORIDE 50 MG: 50 TABLET ORAL at 23:33

## 2023-11-29 RX ADMIN — DOCUSATE SODIUM 50MG AND SENNOSIDES 8.6MG 1 TABLET: 8.6; 5 TABLET, FILM COATED ORAL at 20:06

## 2023-11-29 RX ADMIN — SEVELAMER CARBONATE 800 MG: 800 TABLET, FILM COATED ORAL at 11:15

## 2023-11-29 RX ADMIN — SODIUM BICARBONATE 650 MG TABLET 650 MG: at 20:06

## 2023-11-29 RX ADMIN — IPRATROPIUM BROMIDE AND ALBUTEROL SULFATE 3 ML: .5; 3 SOLUTION RESPIRATORY (INHALATION) at 13:55

## 2023-11-29 RX ADMIN — IPRATROPIUM BROMIDE AND ALBUTEROL SULFATE 3 ML: .5; 3 SOLUTION RESPIRATORY (INHALATION) at 20:23

## 2023-11-29 RX ADMIN — HYDRALAZINE HYDROCHLORIDE 25 MG: 25 TABLET, FILM COATED ORAL at 08:41

## 2023-11-29 RX ADMIN — CARVEDILOL 25 MG: 12.5 TABLET, FILM COATED ORAL at 08:41

## 2023-11-29 NOTE — ANESTHESIA PREPROCEDURE EVALUATION
Anesthesia Evaluation     Patient summary reviewed and Nursing notes reviewed   no history of anesthetic complications:   NPO Solid Status: > 8 hours  NPO Liquid Status: < 2 hours           Airway   Mallampati: II  TM distance: >3 FB  Neck ROM: full  No difficulty expected  Dental - normal exam   (+) upper dentures and poor dentition    Pulmonary - normal exam    breath sounds clear to auscultation  (+) lung cancer (2005 s/p chemo xrt Left upper lobe), COPD moderate, asthma,home oxygen (3lpm NC), shortness of breath  Cardiovascular   Exercise tolerance: poor (<4 METS)    ECG reviewed  Rhythm: regular  Rate: abnormal    (+) hypertension, valvular problems/murmurs murmur and AS, CAD, CHF , murmur, PVD, DVT resolved, hyperlipidemia,  carotid artery disease carotid bilateral    ROS comment: 2019 echo EF 60% mild fibrocalcific MV and AV    Neuro/Psych  (+) headaches, numbness  GI/Hepatic/Renal/Endo    (+) GERD well controlled, PUD, renal disease- CRI and ESRD, diabetes mellitus type 2, thyroid problem hypothyroidism    Musculoskeletal     (+) back pain  Abdominal    Substance History - negative use     OB/GYN negative ob/gyn ROS         Other   arthritis,   history of cancer remission    ROS/Med Hx Other: ECHO Interpretation Summary 10/31/23  ·  Left ventricular systolic function is normal. Calculated left ventricular EF = 56.8%  ·  Left ventricular wall thickness is consistent with mild asymmetric hypertrophy.  ·  Left ventricular diastolic function is consistent with (grade I) impaired relaxation.  ·  Left atrial volume is moderately increased.  ·  Moderate aortic valve stenosis is present.  ·  Estimated right ventricular systolic pressure from tricuspid regurgitation is normal (<35 mmHg).  ·  Mild dilation of the aortic root is present.                     Anesthesia Plan    ASA 4     MAC and general     (Patient understands anesthesia not responsible for dental damage.  Labs: 11/28 H/H 7.8/27 Na 135 K 5.5   Creat 3.25  11/27 ABG: pH 7.28 PCO2 44 PO2 76)  intravenous induction     Anesthetic plan, risks, benefits, and alternatives have been provided, discussed and informed consent has been obtained with: patient.  Pre-procedure education provided  Use of blood products discussed with patient  Consented to blood products.        CODE STATUS:    Medical Intervention Limits: NO intubation (DNI)  Level Of Support Discussed With: Patient  Code Status (Patient has no pulse and is not breathing): No CPR (Do Not Attempt to Resuscitate)  Medical Interventions (Patient has pulse or is breathing): Limited Support

## 2023-11-29 NOTE — SIGNIFICANT NOTE
11/29/23 [facility]   Spiritual Care   Use of Spiritual Resources spirituality for coping, indicated strong use of   Spiritual Care Source  initiative   Spiritual Care Follow-Up follow-up planned regularly for general support   Response to Spiritual Care emotion expressed;engaged in conversation;receptive of support;thanks expressed   Spiritual Care Interventions decision-making facilitated;supportive conversation provided   Spiritual Care Visit Type follow-up   Spiritual Care Request coping/stress of illness support;decision-making support;spiritual/moral support   Receptivity to Spiritual Care visit welcomed     Pt expressed being in good spirits today and feeling better.

## 2023-11-29 NOTE — PROGRESS NOTES
" LOS: 4 days   Patient Care Team:  Rafael Lyons MD as PCP - General (Internal Medicine)  Regis Mckeon MD as Consulting Physician (Radiation Oncology)  Carlton Casillas MD as Consulting Physician (Gastroenterology)  Susan Marcos APRN as Nurse Practitioner (Gastroenterology)    Chief Complaint: ESRD    Subjective     History of Present Illness  Pt is feeling a bit better.  Seen earlier this morning, sitting in bed eating breakfast ,  She decided to do dialysis.  Seen by vascular.    Subjective:  Symptoms:  Improved.  She reports shortness of breath.  No chest pain, chest pressure or anxiety.    Diet:  Adequate intake.  No nausea or vomiting.    Activity level: Impaired due to weakness.    Pain:  She reports no pain.        History taken from: patient chart    Objective     Vital Sign Min/Max for last 24 hours  Temp  Min: 97.5 °F (36.4 °C)  Max: 98.6 °F (37 °C)   BP  Min: 129/50  Max: 147/57   Pulse  Min: 86  Max: 100   Resp  Min: 16  Max: 20   SpO2  Min: 96 %  Max: 99 %   Flow (L/min)  Min: 3  Max: 6   Weight  Min: 74.2 kg (163 lb 9.3 oz)  Max: 74.2 kg (163 lb 9.3 oz)     Flowsheet Rows      Flowsheet Row First Filed Value   Admission Height 165.1 cm (65\") Documented at 11/25/2023 1304   Admission Weight 75.3 kg (166 lb 0.1 oz) Documented at 11/25/2023 1304            I/O this shift:  In: 600 [P.O.:600]  Out: 700 [Urine:700]  I/O last 3 completed shifts:  In: 960 [P.O.:960]  Out: 850 [Urine:850]    Objective:  General Appearance:  Comfortable and in no acute distress.    Vital signs: (most recent): Blood pressure 132/42, pulse 93, temperature 97.6 °F (36.4 °C), temperature source Oral, resp. rate 20, height 165.1 cm (65\"), weight 74.2 kg (163 lb 9.3 oz), SpO2 98%, not currently breastfeeding.  Vital signs are normal.  No fever.    Output: Producing urine.    HEENT: Normal HEENT exam.    Lungs:  Normal effort and normal respiratory rate.  There are decreased breath sounds.  No rales.    Heart: Normal " "rate.    Abdomen: Abdomen is soft.  Bowel sounds are normal.   There is no abdominal tenderness.     Extremities: Normal range of motion.  There is dependent edema.    Pulses: Distal pulses are intact.    Neurological: Patient is alert and oriented to person, place and time.    Pupils:  Pupils are equal, round, and reactive to light.    Skin:  Warm and dry.                Results Review:     I reviewed the patient's new clinical results.    WBC WBC   Date Value Ref Range Status   11/28/2023 6.44 3.40 - 10.80 10*3/mm3 Final   11/27/2023 5.63 3.40 - 10.80 10*3/mm3 Final      HGB Hemoglobin   Date Value Ref Range Status   11/28/2023 7.8 (L) 12.0 - 15.9 g/dL Final   11/27/2023 7.5 (L) 12.0 - 15.9 g/dL Final      HCT Hematocrit   Date Value Ref Range Status   11/28/2023 27.0 (L) 34.0 - 46.6 % Final   11/27/2023 25.8 (L) 34.0 - 46.6 % Final      Platlets No results found for: \"LABPLAT\"   MCV MCV   Date Value Ref Range Status   11/28/2023 101.9 (H) 79.0 - 97.0 fL Final   11/27/2023 100.4 (H) 79.0 - 97.0 fL Final          Sodium Sodium   Date Value Ref Range Status   11/28/2023 135 (L) 136 - 145 mmol/L Final   11/27/2023 136 136 - 145 mmol/L Final     Sodium, Arterial   Date Value Ref Range Status   11/27/2023 137.6 136 - 146 mmol/L Final      Potassium Potassium   Date Value Ref Range Status   11/28/2023 5.5 (H) 3.5 - 5.2 mmol/L Final   11/27/2023 5.9 (H) 3.5 - 5.2 mmol/L Final      Chloride Chloride   Date Value Ref Range Status   11/28/2023 102 98 - 107 mmol/L Final   11/27/2023 103 98 - 107 mmol/L Final      CO2 CO2   Date Value Ref Range Status   11/28/2023 18.4 (L) 22.0 - 29.0 mmol/L Final   11/27/2023 18.9 (L) 22.0 - 29.0 mmol/L Final      BUN BUN   Date Value Ref Range Status   11/28/2023 103 (H) 8 - 23 mg/dL Final   11/27/2023 99 (H) 8 - 23 mg/dL Final      Creatinine Creatinine   Date Value Ref Range Status   11/28/2023 3.25 (H) 0.57 - 1.00 mg/dL Final   11/27/2023 3.21 (H) 0.57 - 1.00 mg/dL Final      Calcium " "Calcium   Date Value Ref Range Status   11/28/2023 7.7 (L) 8.6 - 10.5 mg/dL Final   11/27/2023 7.8 (L) 8.6 - 10.5 mg/dL Final      PO4 No results found for: \"CAPO4\"   Albumin Albumin   Date Value Ref Range Status   11/28/2023 3.2 (L) 3.5 - 5.2 g/dL Final   11/27/2023 3.2 (L) 3.5 - 5.2 g/dL Final      Magnesium No results found for: \"MG\"   Uric Acid No results found for: \"URICACID\"     Medication Review:   budesonide-formoterol, 2 puff, Inhalation, BID - RT  bumetanide, 4 mg, Intravenous, Q8H  carvedilol, 25 mg, Oral, BID With Meals  cetirizine, 10 mg, Oral, Daily  cilostazol, 100 mg, Oral, BID  epoetin mita/mita-epbx, 20,000 Units, Intravenous, Weekly  heparin (porcine), 5,000 Units, Subcutaneous, Q12H  hydrALAZINE, 25 mg, Oral, BID  ipratropium-albuterol, 3 mL, Nebulization, TID - RT  linagliptin, 5 mg, Oral, Daily  metOLazone, 10 mg, Oral, Once  pantoprazole, 40 mg, Oral, BID AC  rosuvastatin, 20 mg, Oral, Nightly  sennosides-docusate, 1 tablet, Oral, Nightly  sevelamer, 800 mg, Oral, TID With Meals  sodium bicarbonate, 650 mg, Oral, BID  sodium chloride, 10 mL, Intravenous, Q12H          Assessment & Plan       Acute exacerbation of CHF (congestive heart failure)    Essential hypertension    GERD (gastroesophageal reflux disease)    Stage 4 chronic kidney disease    Type 2 diabetes mellitus without complication    Acute hypoxic respiratory failure    COPD exacerbation    Hypoxia    Aortic stenosis, moderate      Assessment & Plan  - Probable new ESRD, progressive renal disease from diabetic nephropathy on a background of hypertension and congestive heart failure and bilateral renal artery stenosis.  Volume overload is slowly improving.  Continue Bumex 4 mg IV every 8 hours.  Give metolazone 10 mg p.o. x 1.  Patient seen by vascular, she elected to pursue dialysis.  I alerted vascular.  Will start dialysis as soon as an access is in place. 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 "  This is her fourth admission with volume overload.  - Hypertension, blood pressure is better with better volume control.  - Bilateral renal artery stenosis, aldosterone/renin ratio was not elevated.  No intervention.  - Congestive heart failure, diastolic dysfunction, known preserved LV function.  - History of lung cancer 2005.  Followed by pulmonary.  - Diabetes with complications.  - Anemia, severe.  tsat ok.  Started CASSANDRA, goal hemoglobin above 10.  - Hyperkalemia, secondary to renal failure, combination of diuretics should be helpful, on low potassium diet.  No labs today.  Will recheck in AM.  - Metabolic acidosis, mild, should improve with diuresis and initiation of dialysis.  On oral sodium bicarb for now and monitor.  Discussed with primary.  Will follow.    Edi García MD  11/29/23  14:22 EST

## 2023-11-29 NOTE — THERAPY EVALUATION
Respiratory Therapist Broncho-Pulmonary Hygiene Progress Note      Patient Name:  Charlette Rai  YOB: 1935    Charlette Rai meets the qualification for Level 2 of the Bronco-Pulmonary Hygiene Protocol. This was based on my daily patient assessment and includes review of chest x-ray results, cough ability and quality, oxygenation, secretions or risk for secretion development and patient mobility.     Broncho-Pulmonary Hygiene Assessment:    Level of Movement: Actively changing positions without assistance  Alert/ oriented/ cooperative    Breath Sounds: Clear to slightly diminished    Cough: Strong, effective    Chest X-Ray: Possible signs of consolidation and/or atelectasis or clear.     Sputum Productions: None or small amount of thin or watery secretions with effective cough    History and Physical: Chronic condition    SpO2 to Oxygen Need: greater than 92% on room air or  less than 3L nasal canula    Current SpO2 is: 96 on 3lpm    Based on this information, I have completed the following interventions: Aerobika with bronchodialtor medication or TID      Electronically signed by Dorothy Gould, MAURO, 11/29/23, 7:44 AM EST.

## 2023-11-29 NOTE — PROGRESS NOTES
[facility]     Progress Note    Patient Name: Charlette Rai  : 1935  MRN: [id number]  Primary Care Physician:  Rafael Lyons MD  Date of admission: 2023      Subjective   Brief summary.  Admitted with hypoxia and shortness of breath      HPI:  Patient feeling better, seen earlier this morning swelling of the right hand significantly improved, still having trouble breathing.    Review of Systems     Fatigue and shortness of breath, anxious.      Objective     Vitals:   Temp:  [97.5 °F (36.4 °C)-98.6 °F (37 °C)] 97.9 °F (36.6 °C)  Heart Rate:  [] 90  Resp:  [16-20] 18  BP: (129-147)/(42-57) 137/53  Flow (L/min):  [3-5] 3    Physical Exam :     Elderly female not in acute distress.  Heart regular.  Lungs diminished breath sounds with few rhonchi.  Abdomen soft.  Both upper and lower extremities with edema, right upper extremity edema significantly improved.  Neuro awake alert and oriented today      Result Review:  I have personally reviewed the results from the time of this admission to 2023 18:42 EST and agree with these findings:  [x]  Laboratory  []  Microbiology  []  Radiology  []  EKG/Telemetry   []  Cardiology/Vascular   []  Pathology  []  Old records  []  Other:        Assessment / Plan       Active Hospital Problems:  Active Hospital Problems    Diagnosis     Aortic stenosis, moderate     COPD exacerbation     Hypoxia     Acute hypoxic respiratory failure     Acute exacerbation of CHF (congestive heart failure)     Essential hypertension     GERD (gastroesophageal reflux disease)     Stage 4 chronic kidney disease     Type 2 diabetes mellitus without complication        Plan:   Patient agreeable for dialysis.  Notified nephrologist.  Continue supportive care.  PT OT.  Increase activity    DVT prophylaxis:  Medical DVT prophylaxis orders are present.    CODE STATUS:   Medical Intervention Limits: NO intubation (DNI)  Level Of Support Discussed With: Patient  Code Status (Patient  has no pulse and is not breathing): No CPR (Do Not Attempt to Resuscitate)  Medical Interventions (Patient has pulse or is breathing): Limited Support                  Electronically signed by Rafael Lyons MD, 11/29/23, 6:43 PM EST.

## 2023-11-29 NOTE — PROGRESS NOTES
[facility]     Progress Note    Patient Name: Charlette Rai  : 1935  MRN: [id number]  Primary Care Physician:  Rafael Lyons MD  Date of admission: 2023    Subjective   Subjective     Patient has decided to proceed with dialysis.  Feeling a little better today.  No other complaints at this time.    Objective   Objective     Vitals:   Temp:  [97.5 °F (36.4 °C)-98.6 °F (37 °C)] 97.9 °F (36.6 °C)  Heart Rate:  [] 90  Resp:  [16-20] 18  BP: (129-147)/(42-57) 137/53  Flow (L/min):  [3-5] 3    Physical Exam   General: Alert, no acute distress.  Sitting in a chair.  HEENT: PERRLA  Abdomen: Benign  Extremities: Symmetric.  Neuro: No gross deficits        Assessment & Plan   Assessment / Plan     Assessment/Plan:    Mrs. Rai is in need for permanent access for hemodialysis.  The plan is for creation of a left upper arm arteriovenous graft using early access material.  I have discussed with her in detail the mechanics of the procedure, the indications, benefits, risks, alternatives, as well as potential complications to include but not limited to infection, bleeding, reoperation, failure of the access.  She appears to understand and desires to proceed.    Active Hospital Problems:  Active Hospital Problems    Diagnosis     Aortic stenosis, moderate     COPD exacerbation     Hypoxia     Acute hypoxic respiratory failure     Acute exacerbation of CHF (congestive heart failure)     Essential hypertension     GERD (gastroesophageal reflux disease)     Stage 4 chronic kidney disease     Type 2 diabetes mellitus without complication            Electronically signed by Wan Barksdale MD, 23, 5:32 PM EST.

## 2023-11-29 NOTE — PROGRESS NOTES
Pulmonary / Critical Care Progress Note      Patient Name: Charlette Rai  : 1935  MRN: [id number]  Attending:  Rafael Lyons MD  Date of admission: 2023    Subjective   Subjective   Follow-up for hypoxia    Over past 24 hours:  Hypoxia slightly better  On 5 L of oxygen  Is responding somewhat to diuresis dyspnea and orthopnea slightly better  No coughing or wheezing  No chest pain or hemoptysis  No nausea, fevers or chills  Chest CT with volume overload and some mucous plugging    Objective   Objective     Vitals:   Temp:  [97.5 °F (36.4 °C)-98.6 °F (37 °C)] 98.5 °F (36.9 °C)  Heart Rate:  [] 92  Resp:  [16-20] 18  BP: (129-147)/(48-57) 147/57  Flow (L/min):  [5-6] 5    Physical Exam   Vital Signs Reviewed   General:  WDWN, Alert, NAD.    HEENT:  PERRL, EOMI.  OP, nares clear  Chest:  good aeration, managed with crackles and rhonchi bilaterally, tympanic to percussion bilaterally, no work of breathing noted  CV: RRR, no MGR, pulses 2+, equal.  Abd:  Soft, NT, ND, + BS, no HSM  EXT:  no clubbing, no cyanosis, no edema  Neuro:  A&Ox3, CN grossly intact, no focal deficits.  Skin: No rashes or lesions noted      Result Review    Result Review:  I have personally reviewed the results from the time of this admission to 2023 12:13 EST and agree with these findings:  [x]  Laboratory  [x]  Microbiology  [x]  Radiology  [x]  EKG/Telemetry   [x]  Cardiology/Vascular   []  Pathology  []  Old records  []  Other:  Most notable findings include:       Lab 23  0415 23  1700 23  0428 23  0340 23  2154 23  1313   WBC 6.44  --  5.63 4.27  --  9.50   HEMOGLOBIN 7.8*  --  7.5* 7.8*  --  8.1*   HEMATOCRIT 27.0*  --  25.8* 25.8*  --  26.9*   PLATELETS 206  --  214 194  --  191   SODIUM 135*  --  136 139  --  140   SODIUM, ARTERIAL  --  137.6  --   --  139.4  --    POTASSIUM 5.5*  --  5.9* 5.6*  --  5.3*   CHLORIDE 102  --  103 107  --  107   CO2 18.4*  --  18.9* 18.1*  --  20.0*    *  --  99* 88*  --  80*   CREATININE 3.25*  --  3.21* 2.80*  --  2.92*   GLUCOSE 182*  --  210* 175*  --  204*   GLUCOSE, ARTERIAL  --  175*  --   --  173*  --    CALCIUM 7.7*  --  7.8* 8.3*  --  8.4*   PHOSPHORUS 5.9*  --   --   --   --   --    TOTAL PROTEIN  --   --  6.1  --   --  6.6   ALBUMIN 3.2*  --  3.2*  --   --  3.5   GLOBULIN  --   --  2.9  --   --  3.1     CT Chest Without Contrast Diagnostic    Result Date: 11/28/2023  PROCEDURE: CT CHEST WO CONTRAST DIAGNOSTIC  COMPARISON: [facility], CR, XR CHEST 1 VW, 11/25/2023, 13:23.  [facility], CR, XR CHEST 1 VW, 11/09/2023, 4:37.  [facility], CR, XR CHEST 1 VW, 11/27/2023, 10:23.  [facility], CT, CT CHEST WO CONTRAST DIAGNOSTIC, 3/01/2023, 15:47.  INDICATIONS: hypoxia, worsening infiltrates  PROTOCOL:   Standard imaging protocol performed    RADIATION:   DLP: 117 mGy*cm   Automated exposure control was utilized to minimize radiation dose.  TECHNIQUE: Axial images of the chest without intravenous contrast.  FINDINGS: There is a small right pleural effusion.  There is mild cardiac enlargement.  The thoracic aorta has a normal caliber.  There is dense atherosclerotic calcification.  There is coronary artery calcification.  Stable irregular consolidation inferiorly in the right upper lobe consistent with treatment related change.  There are postoperative changes superiorly in the left hemithorax.  There are areas of airspace consolidation in the lower lobes.  Calcified pleural plaques are present in the left hemithorax.  Endobronchial secretion is present in the right lower lobe.  There are areas of mild intralobular septal thickening.  Degenerative changes are present in the thoracic spine.  There are probable simple and proteinaceous cyst in the visualized kidneys.  IMPRESSION:  1. Areas of airspace consolidation in the lower lobes suspicious for pneumonia.  Areas of endobronchial secretion  are present in the right lower lobe.  2. Small right pleural effusion.  3. Areas of intraobular septal thickening may represent mild pulmonary edema.   YARELIS LUCAS MD       Electronically Signed and Approved By: YARELIS LUCAS MD on 11/28/2023 at 15:32                Assessment & Plan   Assessment / Plan     Active Hospital Problems:  Active Hospital Problems    Diagnosis     Aortic stenosis, moderate     COPD exacerbation     Hypoxia     Acute hypoxic respiratory failure     Acute exacerbation of CHF (congestive heart failure)     Essential hypertension     GERD (gastroesophageal reflux disease)     Stage 4 chronic kidney disease     Type 2 diabetes mellitus without complication        Impression:  Acute hypoxemic respiratory failure  Acute decompensated diastolic congestive heart failure  Volume overload  Acute cardiogenic pulmonary edema  End-stage renal disease.  Approaching hemodialysis  COPD without exacerbation  Mucous plugging  History of lung cancer status post lobectomy on the left in 2005  Essential hypertension  Hyperkalemia     Plan:  This patient has no clinical symptoms of pneumonia and no evidence of COPD exacerbation.  Given her presentation I suspect that she has pulmonary edema due to volume overload and end-stage renal disease  Chest CT shows findings system with volume overload.  Also had some mucous plugging and atelectasis  Aggressive diuresis per nephrology.  Continue Bumex.  Redose metolazone today.  Planning for dialysis  Trend renal panel and electrolytes  I added Symbicort.  Continue nebulizers.  Will uptitrate bronchopulmonary hygiene  Wean O2 to keep SPO2 greater than 90%       DVT prophylaxis:  Medical DVT prophylaxis orders are present.    CODE STATUS:   Medical Intervention Limits: NO intubation (DNI)  Level Of Support Discussed With: Patient  Code Status (Patient has no pulse and is not breathing): No CPR (Do Not Attempt to Resuscitate)  Medical Interventions (Patient has  pulse or is breathing): Limited Support      Labs, imaging, microbiology, notes and medications personally reviewed  Discussed with primary    Electronically signed by Clement Motta MD, 11/29/23, 12:15 PM EST.

## 2023-11-29 NOTE — NURSING NOTE
Visited with Ms Rai in her room on 4 NT. She is alert and oriented x 4, She tells me she had the hospice EOS yesterday. She is no longer interested in hospice care but wants to proceed with hemodialysis. There has been a surgical consult placed for dialysis catheter to be placed. Family are all in agreement with the decision.  Patient has a living will in place with a DNR/DNI order. But interested in all other aspect of full interventions.  Palliative to sign off. Please re consult for any further needs.  Padmaja LLANES RN, BSN  Palliative Care

## 2023-11-29 NOTE — PROGRESS NOTES
CARDIOLOGY  INPATIENT PROGRESS NOTE                [address]    11/29/2023      PATIENT IDENTIFICATION:   Name:  Charlette Rai      MRN:  [id number]     88 y.o.  female                 SUBJECTIVE:   Patient states he feels a little bit better today sitting up in a chair resting fairly comfortably  OBJECTIVE:  Vitals:    11/29/23 0739 11/29/23 1151 11/29/23 1355 11/29/23 1445   BP:  132/42  137/53   BP Location:  Right arm  Right arm   Patient Position:  Sitting  Sitting   Pulse: 92 86 93 90   Resp: 18 20 20 18   Temp:  97.6 °F (36.4 °C)  97.9 °F (36.6 °C)   TempSrc:  Oral  Oral   SpO2: 99% 98% 98% 100%   Weight:       Height:               Body mass index is 27.22 kg/m².    Intake/Output Summary (Last 24 hours) at 11/29/2023 1548  Last data filed at 11/29/2023 1347  Gross per 24 hour   Intake 1080 ml   Output 1550 ml   Net -470 ml         Physical Exam  General Appearance:   no acute distress  Alert and oriented x3  HENT:   lips not cyanotic  Atraumatic  Neck:  No jvd   supple  Respiratory:  no respiratory distress  normal breath sounds  no rales  Cardiovascular:    regular rhythm  no S3, no S4   2/6 mid to late peaking systolic murmur  no rub  lower extremity edema: Mild  Skin:   warm, dry  No rashes      No Known Allergies  Scheduled meds:  budesonide-formoterol, 2 puff, Inhalation, BID - RT  bumetanide, 4 mg, Intravenous, Q8H  carvedilol, 25 mg, Oral, BID With Meals  cetirizine, 10 mg, Oral, Daily  cilostazol, 100 mg, Oral, BID  epoetin mita/mita-epbx, 20,000 Units, Intravenous, Weekly  heparin (porcine), 5,000 Units, Subcutaneous, Q12H  hydrALAZINE, 25 mg, Oral, BID  ipratropium-albuterol, 3 mL, Nebulization, TID - RT  linagliptin, 5 mg, Oral, Daily  metOLazone, 10 mg, Oral, Once  pantoprazole, 40 mg, Oral, BID AC  rosuvastatin, 20 mg, Oral, Nightly  sennosides-docusate, 1 tablet, Oral, Nightly  sevelamer, 800 mg, Oral, TID With Meals  sodium bicarbonate, 650 mg, Oral, BID  sodium  "chloride, 10 mL, Intravenous, Q12H      IV meds:                         Data Review:  CBC          11/26/2023    03:40 11/27/2023    04:28 11/28/2023    04:15   CBC   WBC 4.27  5.63  6.44    RBC 2.64  2.57  2.65    Hemoglobin 7.8  7.5  7.8    Hematocrit 25.8  25.8  27.0    MCV 97.7  100.4  101.9    MCH 29.5  29.2  29.4    MCHC 30.2  29.1  28.9    RDW 13.2  13.2  13.2    Platelets 194  214  206      CMP          11/26/2023    03:40 11/27/2023    04:28 11/27/2023    17:00 11/28/2023    04:15   CMP   Glucose 175  210  175  182    BUN 88  99   103    Creatinine 2.80  3.21   3.25    EGFR 16.0  13.6   13.4    Sodium 139  136  137.6  135    Potassium 5.6  5.9   5.5    Chloride 107  103   102    Calcium 8.3  7.8   7.7    Total Protein  6.1      Albumin  3.2   3.2    Globulin  2.9      Total Bilirubin  <0.2      Alkaline Phosphatase  157      AST (SGOT)  9      ALT (SGPT)  9      Albumin/Globulin Ratio  1.1      BUN/Creatinine Ratio 31.4  30.8   31.7    Anion Gap 13.9  14.1   14.6       CARDIAC LABS:      Lab 11/28/23  0415 11/25/23  1541 11/25/23  1313   PROBNP 26,260.0*  --  7,806.0*   HSTROP T  --  81* 83*        No results found for: \"DIGOXIN\"   Lab Results   Component Value Date    TSH 1.650 10/29/2023           Invalid input(s): \"LDLCALC\"  No results found for: \"POCTROP\"  Lab Results   Component Value Date    TROPONINT 81 (C) 11/25/2023   (  Lab Results   Component Value Date    MG 1.7 11/14/2023     Results for orders placed during the hospital encounter of 10/29/23    Adult Transthoracic Echo Complete W/ Cont if Necessary Per Protocol    Interpretation Summary    Left ventricular systolic function is normal. Calculated left ventricular EF = 56.8%    Left ventricular wall thickness is consistent with mild asymmetric hypertrophy.    Left ventricular diastolic function is consistent with (grade I) impaired relaxation.    Left atrial volume is moderately increased.    Moderate aortic valve stenosis is present.    " Estimated right ventricular systolic pressure from tricuspid regurgitation is normal (<35 mmHg).    Mild dilation of the aortic root is present.           ASSESSMENT:    Acute exacerbation of CHF (congestive heart failure)    Essential hypertension    Aortic stenosis, moderate    Stage 4 chronic kidney disease    Type 2 diabetes mellitus without complication    GERD (gastroesophageal reflux disease)    Acute hypoxic respiratory failure    COPD exacerbation    Hypoxia        PLAN:  1.  IV Bumex and metolazone patient currently being set up for hemodialysis continue with attempted diuresis          León Sanchez MD  11/29/2023    15:48 EST

## 2023-11-30 ENCOUNTER — ANESTHESIA (OUTPATIENT)
Dept: PERIOP | Facility: HOSPITAL | Age: 88
End: 2023-11-30
Payer: MEDICARE

## 2023-11-30 LAB
ALBUMIN SERPL-MCNC: 3.3 G/DL (ref 3.5–5.2)
ANION GAP SERPL CALCULATED.3IONS-SCNC: 14 MMOL/L (ref 5–15)
BUN SERPL-MCNC: 106 MG/DL (ref 8–23)
BUN/CREAT SERPL: 35.9 (ref 7–25)
CALCIUM SPEC-SCNC: 8.6 MG/DL (ref 8.6–10.5)
CHLORIDE SERPL-SCNC: 99 MMOL/L (ref 98–107)
CO2 SERPL-SCNC: 25 MMOL/L (ref 22–29)
CREAT SERPL-MCNC: 2.95 MG/DL (ref 0.57–1)
DEPRECATED RDW RBC AUTO: 44.6 FL (ref 37–54)
EGFRCR SERPLBLD CKD-EPI 2021: 15 ML/MIN/1.73
ERYTHROCYTE [DISTWIDTH] IN BLOOD BY AUTOMATED COUNT: 12.9 % (ref 12.3–15.4)
GLUCOSE BLDC GLUCOMTR-MCNC: 100 MG/DL (ref 70–99)
GLUCOSE SERPL-MCNC: 92 MG/DL (ref 65–99)
HCT VFR BLD AUTO: 28.7 % (ref 34–46.6)
HGB BLD-MCNC: 8.8 G/DL (ref 12–15.9)
MAGNESIUM SERPL-MCNC: 1.9 MG/DL (ref 1.6–2.4)
MCH RBC QN AUTO: 29.1 PG (ref 26.6–33)
MCHC RBC AUTO-ENTMCNC: 30.7 G/DL (ref 31.5–35.7)
MCV RBC AUTO: 95 FL (ref 79–97)
PHOSPHATE SERPL-MCNC: 4.7 MG/DL (ref 2.5–4.5)
PLATELET # BLD AUTO: 238 10*3/MM3 (ref 140–450)
PMV BLD AUTO: 10 FL (ref 6–12)
POTASSIUM SERPL-SCNC: 4.2 MMOL/L (ref 3.5–5.2)
RBC # BLD AUTO: 3.02 10*6/MM3 (ref 3.77–5.28)
SODIUM SERPL-SCNC: 138 MMOL/L (ref 136–145)
WBC NRBC COR # BLD AUTO: 6.35 10*3/MM3 (ref 3.4–10.8)

## 2023-11-30 PROCEDURE — 25010000002 ONDANSETRON PER 1 MG: Performed by: NURSE ANESTHETIST, CERTIFIED REGISTERED

## 2023-11-30 PROCEDURE — 36830 ARTERY-VEIN NONAUTOGRAFT: CPT | Performed by: SURGERY

## 2023-11-30 PROCEDURE — 83735 ASSAY OF MAGNESIUM: CPT | Performed by: INTERNAL MEDICINE

## 2023-11-30 PROCEDURE — 25010000002 CEFAZOLIN PER 500 MG: Performed by: NURSE ANESTHETIST, CERTIFIED REGISTERED

## 2023-11-30 PROCEDURE — 25010000002 HEPARIN (PORCINE) PER 1000 UNITS: Performed by: SURGERY

## 2023-11-30 PROCEDURE — 82948 REAGENT STRIP/BLOOD GLUCOSE: CPT

## 2023-11-30 PROCEDURE — 36830 ARTERY-VEIN NONAUTOGRAFT: CPT

## 2023-11-30 PROCEDURE — 25010000002 PROPOFOL 10 MG/ML EMULSION: Performed by: NURSE ANESTHETIST, CERTIFIED REGISTERED

## 2023-11-30 PROCEDURE — 85027 COMPLETE CBC AUTOMATED: CPT | Performed by: INTERNAL MEDICINE

## 2023-11-30 PROCEDURE — 25010000002 HEPARIN (PORCINE) PER 1000 UNITS: Performed by: NURSE ANESTHETIST, CERTIFIED REGISTERED

## 2023-11-30 PROCEDURE — 25010000002 FENTANYL CITRATE (PF) 50 MCG/ML SOLUTION

## 2023-11-30 PROCEDURE — 94664 DEMO&/EVAL PT USE INHALER: CPT

## 2023-11-30 PROCEDURE — 25010000002 MAGNESIUM SULFATE IN D5W 1G/100ML (PREMIX) 1-5 GM/100ML-% SOLUTION: Performed by: INTERNAL MEDICINE

## 2023-11-30 PROCEDURE — 99232 SBSQ HOSP IP/OBS MODERATE 35: CPT | Performed by: INTERNAL MEDICINE

## 2023-11-30 PROCEDURE — 94799 UNLISTED PULMONARY SVC/PX: CPT

## 2023-11-30 PROCEDURE — 25810000003 SODIUM CHLORIDE 0.9 % SOLUTION: Performed by: ANESTHESIOLOGY

## 2023-11-30 PROCEDURE — C1768 GRAFT, VASCULAR: HCPCS | Performed by: SURGERY

## 2023-11-30 PROCEDURE — 25010000002 DEXAMETHASONE PER 1 MG: Performed by: NURSE ANESTHETIST, CERTIFIED REGISTERED

## 2023-11-30 PROCEDURE — 80069 RENAL FUNCTION PANEL: CPT | Performed by: INTERNAL MEDICINE

## 2023-11-30 PROCEDURE — 97110 THERAPEUTIC EXERCISES: CPT

## 2023-11-30 PROCEDURE — 03160JV BYPASS LEFT AXILLARY ARTERY TO SUPERIOR VENA CAVA WITH SYNTHETIC SUBSTITUTE, OPEN APPROACH: ICD-10-PCS | Performed by: SURGERY

## 2023-11-30 PROCEDURE — 25010000002 BUPIVACAINE (PF) 0.5 % SOLUTION 10 ML VIAL: Performed by: SURGERY

## 2023-11-30 PROCEDURE — 25010000002 CEFAZOLIN PER 500 MG: Performed by: SURGERY

## 2023-11-30 PROCEDURE — 25010000002 LIDOCAINE 1 % SOLUTION 20 ML VIAL: Performed by: SURGERY

## 2023-11-30 DEVICE — IMPLANTABLE DEVICE: Type: IMPLANTABLE DEVICE | Site: ARM | Status: FUNCTIONAL

## 2023-11-30 DEVICE — LIGACLIP MCA MULTIPLE CLIP APPLIERS, 20 SMALL CLIPS
Type: IMPLANTABLE DEVICE | Site: ARM | Status: FUNCTIONAL
Brand: LIGACLIP

## 2023-11-30 DEVICE — LIGACLIP MCA MULTIPLE CLIP APPLIERS, 20 MEDIUM CLIPS
Type: IMPLANTABLE DEVICE | Site: ARM | Status: FUNCTIONAL
Brand: LIGACLIP

## 2023-11-30 RX ORDER — ONDANSETRON 2 MG/ML
4 INJECTION INTRAMUSCULAR; INTRAVENOUS ONCE AS NEEDED
Status: DISCONTINUED | OUTPATIENT
Start: 2023-11-30 | End: 2023-11-30 | Stop reason: HOSPADM

## 2023-11-30 RX ORDER — MAGNESIUM SULFATE 1 G/100ML
1 INJECTION INTRAVENOUS ONCE
Status: COMPLETED | OUTPATIENT
Start: 2023-11-30 | End: 2023-11-30

## 2023-11-30 RX ORDER — ONDANSETRON 2 MG/ML
INJECTION INTRAMUSCULAR; INTRAVENOUS AS NEEDED
Status: DISCONTINUED | OUTPATIENT
Start: 2023-11-30 | End: 2023-11-30 | Stop reason: SURG

## 2023-11-30 RX ORDER — LIDOCAINE HYDROCHLORIDE 20 MG/ML
INJECTION, SOLUTION EPIDURAL; INFILTRATION; INTRACAUDAL; PERINEURAL AS NEEDED
Status: DISCONTINUED | OUTPATIENT
Start: 2023-11-30 | End: 2023-11-30 | Stop reason: SURG

## 2023-11-30 RX ORDER — PROMETHAZINE HYDROCHLORIDE 12.5 MG/1
25 TABLET ORAL ONCE AS NEEDED
Status: DISCONTINUED | OUTPATIENT
Start: 2023-11-30 | End: 2023-11-30 | Stop reason: HOSPADM

## 2023-11-30 RX ORDER — HEPARIN SODIUM 1000 [USP'U]/ML
INJECTION, SOLUTION INTRAVENOUS; SUBCUTANEOUS AS NEEDED
Status: DISCONTINUED | OUTPATIENT
Start: 2023-11-30 | End: 2023-11-30 | Stop reason: SURG

## 2023-11-30 RX ORDER — FENTANYL CITRATE 50 UG/ML
INJECTION, SOLUTION INTRAMUSCULAR; INTRAVENOUS AS NEEDED
Status: DISCONTINUED | OUTPATIENT
Start: 2023-11-30 | End: 2023-11-30 | Stop reason: SURG

## 2023-11-30 RX ORDER — OXYCODONE HYDROCHLORIDE 5 MG/1
5 TABLET ORAL
Status: DISCONTINUED | OUTPATIENT
Start: 2023-11-30 | End: 2023-11-30 | Stop reason: HOSPADM

## 2023-11-30 RX ORDER — PROPOFOL 10 MG/ML
VIAL (ML) INTRAVENOUS AS NEEDED
Status: DISCONTINUED | OUTPATIENT
Start: 2023-11-30 | End: 2023-11-30 | Stop reason: SURG

## 2023-11-30 RX ORDER — CEFAZOLIN SODIUM 2 G/100ML
2 INJECTION, SOLUTION INTRAVENOUS ONCE
Status: DISCONTINUED | OUTPATIENT
Start: 2023-11-30 | End: 2023-11-30 | Stop reason: HOSPADM

## 2023-11-30 RX ORDER — PHENYLEPHRINE HCL IN 0.9% NACL 1 MG/10 ML
SYRINGE (ML) INTRAVENOUS AS NEEDED
Status: DISCONTINUED | OUTPATIENT
Start: 2023-11-30 | End: 2023-11-30 | Stop reason: SURG

## 2023-11-30 RX ORDER — SODIUM CHLORIDE 9 MG/ML
9 INJECTION, SOLUTION INTRAVENOUS CONTINUOUS PRN
Status: DISCONTINUED | OUTPATIENT
Start: 2023-11-30 | End: 2023-11-30 | Stop reason: HOSPADM

## 2023-11-30 RX ORDER — CEFAZOLIN SODIUM 1 G/3ML
INJECTION, POWDER, FOR SOLUTION INTRAMUSCULAR; INTRAVENOUS AS NEEDED
Status: DISCONTINUED | OUTPATIENT
Start: 2023-11-30 | End: 2023-11-30 | Stop reason: SURG

## 2023-11-30 RX ORDER — DEXAMETHASONE SODIUM PHOSPHATE 4 MG/ML
INJECTION, SOLUTION INTRA-ARTICULAR; INTRALESIONAL; INTRAMUSCULAR; INTRAVENOUS; SOFT TISSUE AS NEEDED
Status: DISCONTINUED | OUTPATIENT
Start: 2023-11-30 | End: 2023-11-30 | Stop reason: SURG

## 2023-11-30 RX ADMIN — FENTANYL CITRATE 25 MCG: 50 INJECTION, SOLUTION INTRAMUSCULAR; INTRAVENOUS at 17:40

## 2023-11-30 RX ADMIN — ONDANSETRON 4 MG: 2 INJECTION INTRAMUSCULAR; INTRAVENOUS at 16:51

## 2023-11-30 RX ADMIN — MAGNESIUM SULFATE 1 G: 1 INJECTION INTRAVENOUS at 11:10

## 2023-11-30 RX ADMIN — BUMETANIDE 4 MG: 0.25 INJECTION INTRAMUSCULAR; INTRAVENOUS at 02:16

## 2023-11-30 RX ADMIN — Medication 10 ML: at 21:14

## 2023-11-30 RX ADMIN — PROPOFOL 100 MG: 10 INJECTION, EMULSION INTRAVENOUS at 16:35

## 2023-11-30 RX ADMIN — Medication 200 MCG: at 16:51

## 2023-11-30 RX ADMIN — CEFAZOLIN 2 G: 1 INJECTION, POWDER, FOR SOLUTION INTRAMUSCULAR; INTRAVENOUS at 16:29

## 2023-11-30 RX ADMIN — BUMETANIDE 4 MG: 0.25 INJECTION INTRAMUSCULAR; INTRAVENOUS at 21:15

## 2023-11-30 RX ADMIN — CARVEDILOL 25 MG: 12.5 TABLET, FILM COATED ORAL at 21:15

## 2023-11-30 RX ADMIN — IPRATROPIUM BROMIDE AND ALBUTEROL SULFATE 3 ML: .5; 3 SOLUTION RESPIRATORY (INHALATION) at 11:57

## 2023-11-30 RX ADMIN — SODIUM CHLORIDE: 9 INJECTION, SOLUTION INTRAVENOUS at 17:16

## 2023-11-30 RX ADMIN — Medication 200 MCG: at 16:43

## 2023-11-30 RX ADMIN — Medication 50 MCG: at 17:38

## 2023-11-30 RX ADMIN — Medication 200 MCG: at 16:47

## 2023-11-30 RX ADMIN — PANTOPRAZOLE SODIUM 40 MG: 40 TABLET, DELAYED RELEASE ORAL at 21:15

## 2023-11-30 RX ADMIN — LIDOCAINE HYDROCHLORIDE 60 MG: 20 INJECTION, SOLUTION EPIDURAL; INFILTRATION; INTRACAUDAL; PERINEURAL at 16:35

## 2023-11-30 RX ADMIN — IPRATROPIUM BROMIDE AND ALBUTEROL SULFATE 3 ML: .5; 3 SOLUTION RESPIRATORY (INHALATION) at 08:00

## 2023-11-30 RX ADMIN — SEVELAMER CARBONATE 800 MG: 800 TABLET, FILM COATED ORAL at 21:15

## 2023-11-30 RX ADMIN — SODIUM CHLORIDE: 9 INJECTION, SOLUTION INTRAVENOUS at 16:29

## 2023-11-30 RX ADMIN — Medication 200 MCG: at 16:39

## 2023-11-30 RX ADMIN — Medication 400 MCG: at 16:57

## 2023-11-30 RX ADMIN — CILOSTAZOL 100 MG: 100 TABLET ORAL at 21:14

## 2023-11-30 RX ADMIN — Medication 10 ML: at 11:11

## 2023-11-30 RX ADMIN — HYDRALAZINE HYDROCHLORIDE 25 MG: 25 TABLET, FILM COATED ORAL at 21:15

## 2023-11-30 RX ADMIN — FENTANYL CITRATE 25 MCG: 50 INJECTION, SOLUTION INTRAMUSCULAR; INTRAVENOUS at 18:03

## 2023-11-30 RX ADMIN — BUDESONIDE AND FORMOTEROL FUMARATE DIHYDRATE 2 PUFF: 160; 4.5 AEROSOL RESPIRATORY (INHALATION) at 08:00

## 2023-11-30 RX ADMIN — ROSUVASTATIN 20 MG: 20 TABLET, FILM COATED ORAL at 21:15

## 2023-11-30 RX ADMIN — DEXAMETHASONE SODIUM PHOSPHATE 4 MG: 4 INJECTION, SOLUTION INTRAMUSCULAR; INTRAVENOUS at 16:51

## 2023-11-30 RX ADMIN — HEPARIN SODIUM 4000 UNITS: 1000 INJECTION INTRAVENOUS; SUBCUTANEOUS at 17:15

## 2023-11-30 RX ADMIN — Medication 200 MCG: at 17:07

## 2023-11-30 RX ADMIN — Medication 50 MCG: at 17:30

## 2023-11-30 RX ADMIN — SODIUM BICARBONATE 650 MG TABLET 650 MG: at 08:58

## 2023-11-30 RX ADMIN — PANTOPRAZOLE SODIUM 40 MG: 40 TABLET, DELAYED RELEASE ORAL at 08:58

## 2023-11-30 RX ADMIN — FENTANYL CITRATE 50 MCG: 50 INJECTION, SOLUTION INTRAMUSCULAR; INTRAVENOUS at 18:14

## 2023-11-30 RX ADMIN — OXYCODONE HYDROCHLORIDE 5 MG: 5 TABLET ORAL at 19:02

## 2023-11-30 RX ADMIN — HYDRALAZINE HYDROCHLORIDE 25 MG: 25 TABLET, FILM COATED ORAL at 08:58

## 2023-11-30 RX ADMIN — HEPARIN SODIUM 5000 UNITS: 5000 INJECTION INTRAVENOUS; SUBCUTANEOUS at 21:14

## 2023-11-30 RX ADMIN — CARVEDILOL 25 MG: 12.5 TABLET, FILM COATED ORAL at 08:58

## 2023-11-30 NOTE — THERAPY TREATMENT NOTE
Acute Care - Physical Therapy Treatment Note  [facility]UR[facility] Esc[facility]illa     Patient Name: Charlette Rai  : 1935  MRN: [id number]  Today's Date: 2023      Visit Dx:     ICD-10-CM ICD-9-CM   1. Acute exacerbation of chronic obstructive pulmonary disease (COPD)  J44.1 491.21   2. Hypoxia  R09.02 799.02   3. Difficulty walking  R26.2 719.7   4. Stage 4 chronic kidney disease  N18.4 585.4     Patient Active Problem List   Diagnosis    Malignant neoplasm of upper lobe of right lung    Allergic rhinitis    Rheumatoid arthritis    Asthma    Chronic obstructive pulmonary disease    Acute exacerbation of CHF (congestive heart failure)    Essential hypertension    GERD (gastroesophageal reflux disease)    Hyperlipidemia LDL goal <70    Lumbar spinal stenosis    Migraine    Monoclonal paraproteinemia    Osteopenia    Oxygen dependent    Peripheral neuropathy    Stage 4 chronic kidney disease    Type 2 diabetes mellitus without complication    Vitamin D deficiency    Carotid artery stenosis    Chronic right-sided thoracic back pain    Acute pain of left shoulder    Peripheral vascular disease of lower extremity    Edema    Ex-smoker    Peripheral vascular disease    De Quervain's tenosynovitis    Arthritis of carpometacarpal (CMC) joint of right thumb    Arthritis of right hand    Steal syndrome of dialysis vascular access    Anemia of chronic renal failure, stage 4 (severe)    Acute hypoxic respiratory failure    Anemia of chronic disease    COPD exacerbation    Hypoxia    Aortic stenosis, moderate     Past Medical History:   Diagnosis Date    Allergic rhinitis     Anemia     Arthritis     Asthma     USE INHALERS AND NEBULIZERS    Back pain     Bladder disorder     Cancer     LEFT LUNG CANCER  SURGERY AND CHEMO DONE  AND CURRENTLY   RIGHT LUNG CANCER HAS ONLY RECEIVED RADIATION THUS FAR    Chronic kidney disease     stage 3    CKD (chronic kidney disease) stage 4, GFR 15-29 ml/min     Condition not found     ulcer     Congestive heart failure (CHF)     FOLLOWED BY DR AQUINO. DENIES CP BUT DOES HAVE SOA CHRONIC ISSUE COPD/LUNG CANCER    COPD (chronic obstructive pulmonary disease)     FOLLOWED BY DR MARIAJOSE BAILEY    Coronary artery disease     DENIES CP BUT DOES GET SOA MOST OF THE TIME WITH EXERTION BUT OCC AT REST CHRONIC ISSUE COPD/LUNG CANCER    Deep vein thrombosis     Diabetes mellitus     DOES NOT CHECK BS DAILY    Disease of thyroid gland     HYPOTHYROIDISM    Essential hypertension     Gastric ulcer     GERD (gastroesophageal reflux disease)     Heart murmur     History of transfusion     NO ISSUES POST TRANSFUSION WAS MANY YEARS AGO    Hyperlipemia     Leukocytopenia     FOLLOWED BY DR MIR BAILEY    Limb swelling     Lumbago     Lumbar spinal stenosis     Lung cancer     Migraine headache     Multiple joint pain     On home oxygen therapy     3L/NC PRN    Osteopenia     Reflux esophagitis     Shortness of breath     Thyroid nodule     HAS ULTRASOUND YEARLY BEING MONITORED    Vascular disease     Vitamin D deficiency      Past Surgical History:   Procedure Laterality Date    ABDOMINAL SURGERY      APPENDECTOMY      ARTERIOVENOUS FISTULA/SHUNT SURGERY Left 05/10/2022    Procedure: Left basilic vein transposition;  Surgeon: Wan Barksdale MD;  Location: [facility];  Service: Vascular;  Laterality: Left;    ARTERIOVENOUS FISTULA/SHUNT SURGERY Left 3/23/2023    Procedure: Ligation of left arm arteriovenous fistula;  Surgeon: Wan Barksdale MD;  Location: [facility] OR;  Service: Vascular;  Laterality: Left;    CARDIAC CATHETERIZATION  1996    CARDIAC SURGERY      CARDIAC SURGERY      fluid drained from heart    CATARACT EXTRACTION, BILATERAL  2003    COLONOSCOPY  2014    ENDOSCOPY  2016 2019    FEMORAL ARTERY STENT Bilateral     HYSTERECTOMY      LUNG BIOPSY Left 2005    lobectomy upper lung caner    LUNG VOLUME REDUCTION      OTHER SURGICAL HISTORY      artifical joints/limbs    REPLACEMENT TOTAL KNEE Left 2016    UPPER  GASTROINTESTINAL ENDOSCOPY       PT Assessment (last 12 hours)       PT Evaluation and Treatment       Row Name 11/30/23 0846          Physical Therapy Time and Intention    Subjective Information complains of;weakness;fatigue;pain  -DK     Document Type therapy note (daily note)  -DK     Mode of Treatment individual therapy;physical therapy  -DK     Patient Effort good  -DK     Symptoms Noted During/After Treatment fatigue;increased pain  -DK     Comment Pt reports having already been up 3x this morning. Going for left arm arteriovenous graft today.  -DK       Row Name 11/30/23 0846          Pain    Pretreatment Pain Rating 6/10  -DK     Posttreatment Pain Rating 6/10  -DK     Pain Location - Side/Orientation Bilateral  -DK     Pain Location generalized  -DK     Pain Location - hip;knee;foot  -DK     Pain Intervention(s) Distraction;Therapeutic presence  -DK       Row Name 11/30/23 0846          Cognition    Affect/Mental Status (Cognition) WFL  -DK     Orientation Status (Cognition) oriented x 4  -DK     Follows Commands (Cognition) WFL  -DK     Cognitive Function WFL  -DK     Personal Safety Interventions gait belt;nonskid shoes/slippers when out of bed;supervised activity  -DK       Row Name 11/30/23 0846          Motor Skills    Motor Skills --  therapeutic exercises  -DK     Coordination WFL  -DK     Therapeutic Exercise hip;knee;ankle  -DK     Additional Documentation --  Pt deferred transfers due to fatigue.  -       Row Name 11/30/23 0846          Hip (Therapeutic Exercise)    Hip (Therapeutic Exercise) AAROM (active assistive range of motion)  -DK     Hip AAROM (Therapeutic Exercise) bilateral;flexion;extension;aBduction;aDduction;supine;10 repetitions;2 sets  -       Row Name 11/30/23 0846          Knee (Therapeutic Exercise)    Knee (Therapeutic Exercise) AAROM (active assistive range of motion)  -     Knee AAROM (Therapeutic Exercise) bilateral;flexion;extension;supine;10 repetitions;2 sets  -DK        Row Name 11/30/23 0846          Ankle (Therapeutic Exercise)    Ankle (Therapeutic Exercise) AAROM (active assistive range of motion)  -     Ankle AAROM (Therapeutic Exercise) bilateral;dorsiflexion;plantarflexion;supine;10 repetitions;2 sets  -       Row Name 11/30/23 0846          Plan of Care Review    Plan of Care Reviewed With patient  -DK     Progress no change  -       Row Name 11/30/23 0846          Positioning and Restraints    Pre-Treatment Position in bed  -DK     Post Treatment Position bed  -DK     In Bed supine;call light within reach;encouraged to call for assist;exit alarm on;side rails up x2;legs elevated;heels elevated  -       Row Name 11/30/23 0846          Therapy Assessment/Plan (PT)    Rehab Potential (PT) good, to achieve stated therapy goals  -     Criteria for Skilled Interventions Met (PT) skilled treatment is necessary  -     Therapy Frequency (PT) daily  -     Problem List (PT) problems related to;mobility;strength;pain  -     Activity Limitations Related to Problem List (PT) unable to ambulate safely;unable to transfer safely  -       Row Name 11/30/23 0846          Progress Summary (PT)    Progress Toward Functional Goals (PT) progress toward functional goals is good  -               User Key  (r) = Recorded By, (t) = Taken By, (c) = Cosigned By      Initials Name Provider Type    Sonia Wright PTA Physical Therapist Assistant                    Physical Therapy Education       Title: PT OT SLP Therapies (In Progress)       Topic: Physical Therapy (In Progress)       Point: Mobility training (In Progress)       Learning Progress Summary             Patient Acceptance, E, NR by CS at 11/28/2023 1772                         Point: Home exercise program (Not Started)       Learner Progress:  Not documented in this visit.              Point: Body mechanics (Not Started)       Learner Progress:  Not documented in this visit.              Point: Precautions (In  Progress)       Learning Progress Summary             Patient Acceptance, E, NR by JENN at 11/28/2023 1332                                         User Key       Initials Effective Dates Name Provider Type Discipline     04/25/21 -  Porsche Avery, RENAN Physical Therapist PT                  PT Recommendation and Plan  Planned Therapy Interventions (PT): balance training, bed mobility training, gait training, strengthening, transfer training  Therapy Frequency (PT): daily  Progress Summary (PT)  Progress Toward Functional Goals (PT): progress toward functional goals is good  Plan of Care Reviewed With: patient  Progress: no change   Outcome Measures       Row Name 11/30/23 0846 11/28/23 1200          How much help from another person do you currently need...    Turning from your back to your side while in flat bed without using bedrails? 3  -DK 3  -CS     Moving from lying on back to sitting on the side of a flat bed without bedrails? 3  -DK 3  -CS     Moving to and from a bed to a chair (including a wheelchair)? 3  -DK 3  -CS     Standing up from a chair using your arms (e.g., wheelchair, bedside chair)? 3  -DK 3  -CS     Climbing 3-5 steps with a railing? 2  -DK 2  -CS     To walk in hospital room? 2  -DK 2  -CS     AM-PAC 6 Clicks Score (PT) 16  -DK 16  -CS     Highest Level of Mobility Goal 5 --> Static standing  -DK 5 --> Static standing  -CS        Functional Assessment    Outcome Measure Options AM-PAC 6 Clicks Basic Mobility (PT)  -DK AM-PAC 6 Clicks Basic Mobility (PT)  -CS               User Key  (r) = Recorded By, (t) = Taken By, (c) = Cosigned By      Initials Name Provider Type    Sonia Wright, PTA Physical Therapist Assistant    Porsche Majano, RENAN Physical Therapist                     Time Calculation:    PT Charges       Row Name 11/30/23 0850             Time Calculation    PT Received On 11/30/23  -ESTRELLA      PT Goal Re-Cert Due Date 12/07/23  -ESTRELLA         Timed Charges    35325 - PT  Therapeutic Exercise Minutes 14  -DK      66609 - PT Therapeutic Activity Minutes 4  -DK         Total Minutes    Timed Charges Total Minutes 18  -DK       Total Minutes 18  -DK                User Key  (r) = Recorded By, (t) = Taken By, (c) = Cosigned By      Initials Name Provider Type    Sonia Wright PTA Physical Therapist Assistant                  Therapy Charges for Today       Code Description Service Date Service Provider Modifiers Qty    86258140472 HC PT THER PROC EA 15 MIN 11/30/2023 Sonia Joyce PTA GP 1            PT G-Codes  Outcome Measure Options: AM-PAC 6 Clicks Basic Mobility (PT)  AM-PAC 6 Clicks Score (PT): 16  AM-PAC 6 Clicks Score (OT): 14    Sonia Joyce PTA  11/30/2023

## 2023-11-30 NOTE — OP NOTE
ARTERIOVENOUS FISTULA FORMATION  Procedure Report    Patient Name:  Charlette Rai  YOB: 1935    Date of Surgery:  11/30/2023     Indications: Need for permanent access for hemodialysis    Pre-op Diagnosis:   Stage 4 chronic kidney disease [N18.4]       Post-Op Diagnosis Codes:     * Stage 4 chronic kidney disease [N18.4]    Procedure(s):  Creation of left arm arteriovenous graft using 6 mm AcuSeal Propaten PTFE, axilloaxillary, clockwise flow    Staff:  Surgeon(s):  Wan Barksdale MD    Assistant: Ninoska Weaver RN CSA    Anesthesia: Choice    Estimated Blood Loss: 50 mL    Implants:    Implant Name Type Inv. Item Serial No.  Lot No. LRB No. Used Action   CLIPAPPLR M/ ENDO LIGACLIP 20CLP 11IN MD - [id number] Implant CLIPAPPLR M/ ENDO LIGACLIP 20CLP 11IN MD  [employer]  DIV OF J AND J [id number] Left 1 Implanted   CLIPAPPLR M/ ENDO LIGACLIP 9 3/8IN SM - [id number] Implant CLIPAPPLR M/ ENDO LIGACLIP 9 3/8IN SM  [employer]  DIV OF J AND J [id number] Left 1 Implanted   GRFT ACUSEAL 6MM 40CM - [id number] Implant GRFT ACUSEAL 6MM 40CM   DEAN AND  [id number] Left 1 Implanted       Specimen: None       Findings: Palpable thrill upon completion of the procedure.  Doppler examination reveals a pulsatile signal with high amplitude continuous diastolic flow.  Flow is clockwise.    Complications: None    Description of Procedure: The patient was brought into the operating room and was placed in the supine position.  She was induced into general anesthesia.  The patient was then positioned with the left arm extended on an armboard.  She was then prepped and draped in the usual aseptic fashion.  A stockinette was applied to the hand and forearm and was secured in place using Coban.  Ioban was applied to all the exposed areas of skin.  A timeout was taken to confirm patient, procedure and laterality.  Local anesthesia was then administered at the projected sites of incision.  An  incision was then made in a longitudinal fashion over the proximal left upper arm approximately extending just to the axillary hairline.  The subcutaneous tissue was traversed using electrocautery.  Blunt and sharp dissection were then used to dissect the axillary artery and vein.  Circumferential control was obtained using Vesseloops.  A counterincision was made in the distal upper arm in a transverse fashion.  A tunnel was created from the artery along the lateral aspect of the arm and then via the counterincision back to the medial aspect of the arm towards the axilla.  The graft configuration was designed to be clockwise flow.  The graft, 6 mm AcuSeal Propaten PTFE was soaked in antibiotic solution and brought into the field and brought through the tunnel taking extreme care to avoid any twisting.   it was flushed with heparin saline with no resistance.  The apex was identified and inspected and there was no kink.  The patient was given systemic anticoagulation in the form of heparin 4000 units IV.  3 minutes were allowed for the heparin to circulate.  The axillary artery was then clamped proximally and distally.  A longitudinal arteriotomy was made using an 11 blade and extended using Barone scissors.  The graft was then trimmed in length and fashioned to meet the dimensions of the arteriotomy.  An anastomosis was created using 6-0 Prolene in a running fashion.  Just prior to completion of the anastomosis the graft was flushed in a retrograde fashion and all vessels were bled.  The anastomosis was completed.  Blood flow was reestablished.  A Rhoda clamp was then applied to the graft at the counterincision.  The vein was then clamped proximally and distally using bulldogs.  A longitudinal venotomy was created using an 11 blade and extended using Barone scissors.  The graft was then trimmed in length and fashion to me the dimensions of the venotomy.  An anastomosis was created using 6-0 Prolene in a running  fashion.  Just prior to completion of the anastomosis all vessels were bled and blood flow through the graft was tested.  Flow was excellent.  The anastomosis was completed.  Blood flow was reestablished.  Palpation and Doppler examination revealed the above-mentioned findings.  The wounds were inspected for hemostasis and hemostasis assured.  The two wounds were then approximated with the counterincision using 3-0 Vicryl in a running fashion for approximation of the dermis and subcutaneous tissue followed by Dermabond.  The axillary wound approximated using 3-0 Vicryl in a running fashion for approximation of the subcutaneous tissue followed by 4-0 Monocryl in a running subcuticular stitch followed by Dermabond.  The patient tolerated the procedure well.       Assistant: Ninoska Weaver RN CSA  was responsible for performing the following activities: First assist and their skilled assistance was necessary for the success of this case.    Wan Barksdale MD     Date: 11/30/2023  Time: 18:17 EST

## 2023-11-30 NOTE — PROGRESS NOTES
" LOS: 5 days   Patient Care Team:  Rafael Lyons MD as PCP - General (Internal Medicine)  Regis Mckeon MD as Consulting Physician (Radiation Oncology)  Carlton Casillas MD as Consulting Physician (Gastroenterology)  Susan Marcos APRN as Nurse Practitioner (Gastroenterology)    Chief Complaint: ESRD    Subjective     History of Present Illness  Pt is feeling a bit better.  Seen earlier this morning, sitting in bed eating breakfast ,  She decided to do dialysis.  Seen by vascular.    Subjective:  Symptoms:  Improved.  She reports shortness of breath.  No chest pain, chest pressure or anxiety.    Diet:  Adequate intake.  No nausea or vomiting.    Activity level: Impaired due to weakness.    Pain:  She reports no pain.        History taken from: patient chart    Objective     Vital Sign Min/Max for last 24 hours  Temp  Min: 97.5 °F (36.4 °C)  Max: 99 °F (37.2 °C)   BP  Min: 126/44  Max: 170/62   Pulse  Min: 86  Max: 95   Resp  Min: 18  Max: 20   SpO2  Min: 93 %  Max: 100 %   Flow (L/min)  Min: 3  Max: 3   Weight  Min: 69.2 kg (152 lb 8.9 oz)  Max: 69.2 kg (152 lb 8.9 oz)     Flowsheet Rows      Flowsheet Row First Filed Value   Admission Height 165.1 cm (65\") Documented at 11/25/2023 1304   Admission Weight 75.3 kg (166 lb 0.1 oz) Documented at 11/25/2023 1304            No intake/output data recorded.  I/O last 3 completed shifts:  In: 1080 [P.O.:1080]  Out: 3625 [Urine:3625]    Objective:  General Appearance:  Comfortable and in no acute distress.    Vital signs: (most recent): Blood pressure 157/68, pulse 95, temperature 97.8 °F (36.6 °C), temperature source Oral, resp. rate 18, height 165.1 cm (65\"), weight 69.2 kg (152 lb 8.9 oz), SpO2 94%, not currently breastfeeding.  Vital signs are normal.  No fever.    Output: Producing urine.    HEENT: Normal HEENT exam.    Lungs:  Normal effort and normal respiratory rate.  There are decreased breath sounds.  No rales.    Heart: Normal rate.    Abdomen: " "Abdomen is soft.  Bowel sounds are normal.   There is no abdominal tenderness.     Extremities: Normal range of motion.  There is dependent edema.    Pulses: Distal pulses are intact.    Neurological: Patient is alert and oriented to person, place and time.    Pupils:  Pupils are equal, round, and reactive to light.    Skin:  Warm and dry.                Results Review:     I reviewed the patient's new clinical results.    WBC WBC   Date Value Ref Range Status   11/30/2023 6.35 3.40 - 10.80 10*3/mm3 Final   11/28/2023 6.44 3.40 - 10.80 10*3/mm3 Final      HGB Hemoglobin   Date Value Ref Range Status   11/30/2023 8.8 (L) 12.0 - 15.9 g/dL Final   11/28/2023 7.8 (L) 12.0 - 15.9 g/dL Final      HCT Hematocrit   Date Value Ref Range Status   11/30/2023 28.7 (L) 34.0 - 46.6 % Final   11/28/2023 27.0 (L) 34.0 - 46.6 % Final      Platlets No results found for: \"LABPLAT\"   MCV MCV   Date Value Ref Range Status   11/30/2023 95.0 79.0 - 97.0 fL Final   11/28/2023 101.9 (H) 79.0 - 97.0 fL Final          Sodium Sodium   Date Value Ref Range Status   11/30/2023 138 136 - 145 mmol/L Final   11/28/2023 135 (L) 136 - 145 mmol/L Final     Sodium, Arterial   Date Value Ref Range Status   11/27/2023 137.6 136 - 146 mmol/L Final      Potassium Potassium   Date Value Ref Range Status   11/30/2023 4.2 3.5 - 5.2 mmol/L Final   11/28/2023 5.5 (H) 3.5 - 5.2 mmol/L Final      Chloride Chloride   Date Value Ref Range Status   11/30/2023 99 98 - 107 mmol/L Final   11/28/2023 102 98 - 107 mmol/L Final      CO2 CO2   Date Value Ref Range Status   11/30/2023 25.0 22.0 - 29.0 mmol/L Final   11/28/2023 18.4 (L) 22.0 - 29.0 mmol/L Final      BUN BUN   Date Value Ref Range Status   11/30/2023 106 (H) 8 - 23 mg/dL Final   11/28/2023 103 (H) 8 - 23 mg/dL Final      Creatinine Creatinine   Date Value Ref Range Status   11/30/2023 2.95 (H) 0.57 - 1.00 mg/dL Final   11/28/2023 3.25 (H) 0.57 - 1.00 mg/dL Final      Calcium Calcium   Date Value Ref Range " "Status   11/30/2023 8.6 8.6 - 10.5 mg/dL Final   11/28/2023 7.7 (L) 8.6 - 10.5 mg/dL Final      PO4 No results found for: \"CAPO4\"   Albumin Albumin   Date Value Ref Range Status   11/30/2023 3.3 (L) 3.5 - 5.2 g/dL Final   11/28/2023 3.2 (L) 3.5 - 5.2 g/dL Final      Magnesium Magnesium   Date Value Ref Range Status   11/30/2023 1.9 1.6 - 2.4 mg/dL Final      Uric Acid No results found for: \"URICACID\"     Medication Review:   budesonide-formoterol, 2 puff, Inhalation, BID - RT  bumetanide, 4 mg, Intravenous, Q8H  carvedilol, 25 mg, Oral, BID With Meals  cetirizine, 10 mg, Oral, Daily  cilostazol, 100 mg, Oral, BID  epoetin mita/mita-epbx, 20,000 Units, Intravenous, Weekly  heparin (porcine), 5,000 Units, Subcutaneous, Q12H  hydrALAZINE, 25 mg, Oral, BID  ipratropium-albuterol, 3 mL, Nebulization, TID - RT  linagliptin, 5 mg, Oral, Daily  magnesium sulfate in D5W 1g/100mL (PREMIX), 1 g, Intravenous, Once  pantoprazole, 40 mg, Oral, BID AC  rosuvastatin, 20 mg, Oral, Nightly  sennosides-docusate, 1 tablet, Oral, Nightly  sevelamer, 800 mg, Oral, TID With Meals  sodium bicarbonate, 650 mg, Oral, BID  sodium chloride, 10 mL, Intravenous, Q12H          Assessment & Plan       Acute exacerbation of CHF (congestive heart failure)    Essential hypertension    GERD (gastroesophageal reflux disease)    Stage 4 chronic kidney disease    Type 2 diabetes mellitus without complication    Acute hypoxic respiratory failure    COPD exacerbation    Hypoxia    Aortic stenosis, moderate      Assessment & Plan  - Probable new ESRD, progressive renal disease from diabetic nephropathy on a background of hypertension and congestive heart failure and bilateral renal artery stenosis.  Volume overload is slowly improving.  Continue Bumex 4 mg IV every 8 hours.   Patient seen by vascular, she elected to pursue dialysis. Will start dialysis as soon as an access is in place today and will need to wait 2 days to start. 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  This is her fourth admission with volume overload.  - Hypertension, blood pressure is better with better volume control.  - Bilateral renal artery stenosis, aldosterone/renin ratio was not elevated.  No intervention.  - Congestive heart failure, diastolic dysfunction, known preserved LV function.  - History of lung cancer 2005.  Followed by pulmonary.  - Diabetes with complications.  - Anemia, severe.  tsat ok.  Started CASSANDRA, goal hemoglobin above 10.  - Hyperkalemia, secondary to renal failure, combination of diuretics should be helpful, on low potassium diet.    - Metabolic acidosis, mild,   On oral sodium bicarb.  Improved now, will hold.      Elliott Aviles MD  11/30/23  10:00 EST

## 2023-11-30 NOTE — PROGRESS NOTES
[facility]     Progress Note    Patient Name: Charlette Rai  : 1935  MRN: [id number]  Primary Care Physician:  Rafael Lyons MD  Date of admission: 2023      Subjective   Brief summary.  Admitted with hypoxia and shortness of breath      HPI:  Patient feeling better, seen earlier this morning, sleepy, swelling improving shortness of breath better.  For dialysis catheter placement    Review of Systems     Fatigue and shortness of breath, anxious.  No chest pain, no fever      Objective     Vitals:   Temp:  [97.5 °F (36.4 °C)-98.5 °F (36.9 °C)] 97.5 °F (36.4 °C)  Heart Rate:  [86-94] 89  Resp:  [18-20] 20  BP: (126-170)/(42-62) 170/62  Flow (L/min):  [3-5] 3    Physical Exam :     Elderly female not in acute distress.  Heart regular.  Lungs diminished breath sounds with few rhonchi.  Abdomen soft nontender.    Extremities with trace of edema       Result Review:  I have personally reviewed the results from the time of this admission to 2023 06:19 EST and agree with these findings:  [x]  Laboratory  []  Microbiology  []  Radiology  []  EKG/Telemetry   []  Cardiology/Vascular   []  Pathology  []  Old records  []  Other:        Assessment / Plan       Active Hospital Problems:  Active Hospital Problems    Diagnosis     Aortic stenosis, moderate     COPD exacerbation     Hypoxia     Acute hypoxic respiratory failure     Acute exacerbation of CHF (congestive heart failure)     Essential hypertension     GERD (gastroesophageal reflux disease)     Stage 4 chronic kidney disease     Type 2 diabetes mellitus without complication        Plan:   Patient agreeable for dialysis.  For dialysis catheter today  Continue diuresis  COPD improving  Continue neb treatments  PT OT as tolerates   consulted for discharge placement  Possible inpatient rehab    DVT prophylaxis:  Medical DVT prophylaxis orders are present.    CODE STATUS:   Medical Intervention Limits: NO intubation (DNI)  Level Of  Support Discussed With: Patient  Code Status (Patient has no pulse and is not breathing): No CPR (Do Not Attempt to Resuscitate)  Medical Interventions (Patient has pulse or is breathing): Limited Support                    Electronically signed by Rafael Lyons MD, 11/30/23, 9:46 AM EST.

## 2023-11-30 NOTE — ANESTHESIA POSTPROCEDURE EVALUATION
Patient: Charlette Rai    Procedure Summary       Date: 11/30/23 Room / Location: [facility] OR 01 / [facility] MAIN OR    Anesthesia Start: 1629 Anesthesia Stop: 1831    Procedure: Creation of left arm arteriovenous graft (Left: Arm Upper) Diagnosis:       Stage 4 chronic kidney disease      (Stage 4 chronic kidney disease [N18.4])    Surgeons: Wan Barksdale MD Provider: Alfonso Lima MD    Anesthesia Type: MAC, general ASA Status: 4            Anesthesia Type: MAC, general    Vitals  Vitals Value Taken Time   /46 11/30/23 1845   Temp     Pulse 91 11/30/23 1849   Resp     SpO2 95 % 11/30/23 1849   Vitals shown include unfiled device data.        Post Anesthesia Care and Evaluation    Patient location during evaluation: bedside  Patient participation: complete - patient participated  Level of consciousness: awake  Pain management: adequate    Airway patency: patent  PONV Status: none  Cardiovascular status: acceptable and stable  Respiratory status: acceptable  Hydration status: acceptable    Comments: An Anesthesiologist personally participated in the most demanding procedures (including induction and emergence if applicable) in the anesthesia plan, monitored the course of anesthesia administration at frequent intervals and remained physically present and available for immediate diagnosis and treatment of emergencies.

## 2023-11-30 NOTE — PROGRESS NOTES
Pulmonary / Critical Care Progress Note      Patient Name: Charlette Rai  : 1935  MRN: [id number]  Attending:  Rafael Lyons MD  Date of admission: 2023    Subjective   Subjective   Follow-up for hypoxia    Over past 24 hours:  Oxygenation improving with improvement in volume status  Down to 3 L of oxygen  Getting HD graft today with plan on dialysis at some point in the near future afterwards  Dyspnea and orthopnea improving  No coughing or wheezing  No chest pain or hemoptysis  No nausea, fevers or chills  Chest CT with volume overload and some mucous plugging    Objective   Objective     Vitals:   Temp:  [97.5 °F (36.4 °C)-99 °F (37.2 °C)] 97.8 °F (36.6 °C)  Heart Rate:  [86-95] 95  Resp:  [18-20] 18  BP: (126-170)/(42-68) 157/68  Flow (L/min):  [3] 3    Physical Exam   Vital Signs Reviewed   General:  WDWN, Alert, NAD.    HEENT:  PERRL, EOMI.  OP, nares clear  Chest:  good aeration, decreasing crackles and rhonchi bilaterally, tympanic to percussion bilaterally, no work of breathing noted  CV: RRR, no MGR, pulses 2+, equal.  Abd:  Soft, NT, ND, + BS, no HSM  EXT:  no clubbing, no cyanosis, no edema  Neuro:  A&Ox3, CN grossly intact, no focal deficits.  Skin: No rashes or lesions noted      Result Review    Result Review:  I have personally reviewed the results from the time of this admission to 2023 11:36 EST and agree with these findings:  [x]  Laboratory  [x]  Microbiology  [x]  Radiology  [x]  EKG/Telemetry   [x]  Cardiology/Vascular   []  Pathology  []  Old records  []  Other:  Most notable findings include:       Lab 23  0504 23  0415 23  1700 23  0428 23  0340 23  2154 23  1313   WBC 6.35 6.44  --  5.63 4.27  --  9.50   HEMOGLOBIN 8.8* 7.8*  --  7.5* 7.8*  --  8.1*   HEMATOCRIT 28.7* 27.0*  --  25.8* 25.8*  --  26.9*   PLATELETS 238 206  --  214 194  --  191   SODIUM 138 135*  --  136 139  --  140   SODIUM, ARTERIAL  --   --  137.6  --   --   139.4  --    POTASSIUM 4.2 5.5*  --  5.9* 5.6*  --  5.3*   CHLORIDE 99 102  --  103 107  --  107   CO2 25.0 18.4*  --  18.9* 18.1*  --  20.0*   * 103*  --  99* 88*  --  80*   CREATININE 2.95* 3.25*  --  3.21* 2.80*  --  2.92*   GLUCOSE 92 182*  --  210* 175*  --  204*   GLUCOSE, ARTERIAL  --   --  175*  --   --  173*  --    CALCIUM 8.6 7.7*  --  7.8* 8.3*  --  8.4*   PHOSPHORUS 4.7* 5.9*  --   --   --   --   --    TOTAL PROTEIN  --   --   --  6.1  --   --  6.6   ALBUMIN 3.3* 3.2*  --  3.2*  --   --  3.5   GLOBULIN  --   --   --  2.9  --   --  3.1     CT Chest Without Contrast Diagnostic    Result Date: 11/28/2023  PROCEDURE: CT CHEST WO CONTRAST DIAGNOSTIC  COMPARISON: [facility], CR, XR CHEST 1 VW, 11/25/2023, 13:23.  [facility], CR, XR CHEST 1 VW, 11/09/2023, 4:37.  [facility], CR, XR CHEST 1 VW, 11/27/2023, 10:23.  [facility], CT, CT CHEST WO CONTRAST DIAGNOSTIC, 3/01/2023, 15:47.  INDICATIONS: hypoxia, worsening infiltrates  PROTOCOL:   Standard imaging protocol performed    RADIATION:   DLP: 117 mGy*cm   Automated exposure control was utilized to minimize radiation dose.  TECHNIQUE: Axial images of the chest without intravenous contrast.  FINDINGS: There is a small right pleural effusion.  There is mild cardiac enlargement.  The thoracic aorta has a normal caliber.  There is dense atherosclerotic calcification.  There is coronary artery calcification.  Stable irregular consolidation inferiorly in the right upper lobe consistent with treatment related change.  There are postoperative changes superiorly in the left hemithorax.  There are areas of airspace consolidation in the lower lobes.  Calcified pleural plaques are present in the left hemithorax.  Endobronchial secretion is present in the right lower lobe.  There are areas of mild intralobular septal thickening.  Degenerative changes are present in the thoracic spine.  There are probable  simple and proteinaceous cyst in the visualized kidneys.  IMPRESSION:  1. Areas of airspace consolidation in the lower lobes suspicious for pneumonia.  Areas of endobronchial secretion are present in the right lower lobe.  2. Small right pleural effusion.  3. Areas of intraobular septal thickening may represent mild pulmonary edema.   YARELIS LUCAS MD       Electronically Signed and Approved By: YARELIS LUCAS MD on 11/28/2023 at 15:32                Assessment & Plan   Assessment / Plan     Active Hospital Problems:  Active Hospital Problems    Diagnosis     Aortic stenosis, moderate     COPD exacerbation     Hypoxia     Acute hypoxic respiratory failure     Acute exacerbation of CHF (congestive heart failure)     Essential hypertension     GERD (gastroesophageal reflux disease)     Stage 4 chronic kidney disease     Type 2 diabetes mellitus without complication        Impression:  Acute hypoxemic respiratory failure  Acute decompensated diastolic congestive heart failure  Volume overload  Acute cardiogenic pulmonary edema  End-stage renal disease.  Approaching hemodialysis  COPD without exacerbation  Mucous plugging  History of lung cancer status post lobectomy on the left in 2005  Essential hypertension  Hyperkalemia     Plan:  Clinically improving with optimization of volume status and airway clearance  Will follow-up with us as an outpatient and repeat noncontrast chest CT in 3 months  Aggressive diuresis per nephrology.  Continue Bumex.  Redosing metolazone today.    Getting HD graft today with vascular surgery.  Initiation of dialysis timing per nephrology.  Appreciate assistance  Continue Symbicort.  Continue nebulizers.  Will uptitrate bronchopulmonary hygiene  Wean O2 to keep SPO2 greater than 90%       DVT prophylaxis:  Medical DVT prophylaxis orders are present.    CODE STATUS:   Medical Intervention Limits: NO intubation (DNI)  Level Of Support Discussed With: Patient  Code Status (Patient has no  pulse and is not breathing): No CPR (Do Not Attempt to Resuscitate)  Medical Interventions (Patient has pulse or is breathing): Limited Support      Labs, imaging, microbiology, notes and medications personally reviewed  Discussed with primary    Electronically signed by Clement Motta MD, 11/30/23, 11:37 AM EST.

## 2023-12-01 LAB
ANION GAP SERPL CALCULATED.3IONS-SCNC: 11.4 MMOL/L (ref 5–15)
BUN SERPL-MCNC: 93 MG/DL (ref 8–23)
BUN/CREAT SERPL: 35.4 (ref 7–25)
CALCIUM SPEC-SCNC: 8.2 MG/DL (ref 8.6–10.5)
CHLORIDE SERPL-SCNC: 100 MMOL/L (ref 98–107)
CO2 SERPL-SCNC: 24.6 MMOL/L (ref 22–29)
CREAT SERPL-MCNC: 2.63 MG/DL (ref 0.57–1)
EGFRCR SERPLBLD CKD-EPI 2021: 17.2 ML/MIN/1.73
GLUCOSE SERPL-MCNC: 131 MG/DL (ref 65–99)
POTASSIUM SERPL-SCNC: 4.8 MMOL/L (ref 3.5–5.2)
SODIUM SERPL-SCNC: 136 MMOL/L (ref 136–145)

## 2023-12-01 PROCEDURE — 94664 DEMO&/EVAL PT USE INHALER: CPT

## 2023-12-01 PROCEDURE — 99231 SBSQ HOSP IP/OBS SF/LOW 25: CPT | Performed by: INTERNAL MEDICINE

## 2023-12-01 PROCEDURE — 25010000002 HEPARIN (PORCINE) PER 1000 UNITS: Performed by: SURGERY

## 2023-12-01 PROCEDURE — 99233 SBSQ HOSP IP/OBS HIGH 50: CPT | Performed by: INTERNAL MEDICINE

## 2023-12-01 PROCEDURE — 94799 UNLISTED PULMONARY SVC/PX: CPT

## 2023-12-01 PROCEDURE — 99024 POSTOP FOLLOW-UP VISIT: CPT | Performed by: SURGERY

## 2023-12-01 PROCEDURE — 80048 BASIC METABOLIC PNL TOTAL CA: CPT | Performed by: SURGERY

## 2023-12-01 RX ORDER — FLUTICASONE PROPIONATE 50 MCG
2 SPRAY, SUSPENSION (ML) NASAL NIGHTLY
Status: DISCONTINUED | OUTPATIENT
Start: 2023-12-01 | End: 2023-12-05 | Stop reason: HOSPADM

## 2023-12-01 RX ORDER — FLUTICASONE PROPIONATE 50 MCG
2 SPRAY, SUSPENSION (ML) NASAL DAILY
Status: DISCONTINUED | OUTPATIENT
Start: 2023-12-01 | End: 2023-12-01

## 2023-12-01 RX ORDER — METOLAZONE 5 MG/1
10 TABLET ORAL ONCE
Status: COMPLETED | OUTPATIENT
Start: 2023-12-01 | End: 2023-12-01

## 2023-12-01 RX ORDER — BUMETANIDE 1 MG/1
3 TABLET ORAL DAILY
Status: DISCONTINUED | OUTPATIENT
Start: 2023-12-01 | End: 2023-12-05 | Stop reason: HOSPADM

## 2023-12-01 RX ADMIN — BUDESONIDE AND FORMOTEROL FUMARATE DIHYDRATE 2 PUFF: 160; 4.5 AEROSOL RESPIRATORY (INHALATION) at 19:12

## 2023-12-01 RX ADMIN — HYDRALAZINE HYDROCHLORIDE 25 MG: 25 TABLET, FILM COATED ORAL at 09:41

## 2023-12-01 RX ADMIN — SEVELAMER CARBONATE 800 MG: 800 TABLET, FILM COATED ORAL at 12:48

## 2023-12-01 RX ADMIN — FLUTICASONE PROPIONATE 2 SPRAY: 50 SPRAY, METERED NASAL at 21:57

## 2023-12-01 RX ADMIN — IPRATROPIUM BROMIDE AND ALBUTEROL SULFATE 3 ML: .5; 3 SOLUTION RESPIRATORY (INHALATION) at 13:38

## 2023-12-01 RX ADMIN — BUMETANIDE 4 MG: 0.25 INJECTION INTRAMUSCULAR; INTRAVENOUS at 02:36

## 2023-12-01 RX ADMIN — Medication 10 ML: at 21:59

## 2023-12-01 RX ADMIN — CETIRIZINE HYDROCHLORIDE 10 MG: 10 TABLET, FILM COATED ORAL at 09:41

## 2023-12-01 RX ADMIN — IPRATROPIUM BROMIDE AND ALBUTEROL SULFATE 3 ML: .5; 3 SOLUTION RESPIRATORY (INHALATION) at 07:25

## 2023-12-01 RX ADMIN — CARVEDILOL 25 MG: 12.5 TABLET, FILM COATED ORAL at 17:50

## 2023-12-01 RX ADMIN — CILOSTAZOL 100 MG: 100 TABLET ORAL at 21:56

## 2023-12-01 RX ADMIN — PANTOPRAZOLE SODIUM 40 MG: 40 TABLET, DELAYED RELEASE ORAL at 17:50

## 2023-12-01 RX ADMIN — Medication 10 ML: at 09:41

## 2023-12-01 RX ADMIN — BUDESONIDE AND FORMOTEROL FUMARATE DIHYDRATE 2 PUFF: 160; 4.5 AEROSOL RESPIRATORY (INHALATION) at 07:27

## 2023-12-01 RX ADMIN — HYDRALAZINE HYDROCHLORIDE 25 MG: 25 TABLET, FILM COATED ORAL at 21:56

## 2023-12-01 RX ADMIN — PANTOPRAZOLE SODIUM 40 MG: 40 TABLET, DELAYED RELEASE ORAL at 06:37

## 2023-12-01 RX ADMIN — SEVELAMER CARBONATE 800 MG: 800 TABLET, FILM COATED ORAL at 09:40

## 2023-12-01 RX ADMIN — IPRATROPIUM BROMIDE AND ALBUTEROL SULFATE 3 ML: .5; 3 SOLUTION RESPIRATORY (INHALATION) at 19:12

## 2023-12-01 RX ADMIN — SEVELAMER CARBONATE 800 MG: 800 TABLET, FILM COATED ORAL at 17:51

## 2023-12-01 RX ADMIN — ROSUVASTATIN 20 MG: 20 TABLET, FILM COATED ORAL at 21:56

## 2023-12-01 RX ADMIN — TRAMADOL HYDROCHLORIDE 50 MG: 50 TABLET ORAL at 09:43

## 2023-12-01 RX ADMIN — LINAGLIPTIN 5 MG: 5 TABLET, FILM COATED ORAL at 09:41

## 2023-12-01 RX ADMIN — CILOSTAZOL 100 MG: 100 TABLET ORAL at 09:41

## 2023-12-01 RX ADMIN — METOLAZONE 10 MG: 5 TABLET ORAL at 09:40

## 2023-12-01 RX ADMIN — BUMETANIDE 3 MG: 1 TABLET ORAL at 12:48

## 2023-12-01 RX ADMIN — HEPARIN SODIUM 5000 UNITS: 5000 INJECTION INTRAVENOUS; SUBCUTANEOUS at 09:40

## 2023-12-01 RX ADMIN — TRAMADOL HYDROCHLORIDE 50 MG: 50 TABLET ORAL at 21:57

## 2023-12-01 RX ADMIN — CARVEDILOL 25 MG: 12.5 TABLET, FILM COATED ORAL at 09:40

## 2023-12-01 NOTE — NURSING NOTE
"Spoke with Dr. Lyons and Dr. García regarding patient complaints of ears being \"stopped up.\" Both physicians said they would look into patient chart and address this issue.   "

## 2023-12-01 NOTE — PROGRESS NOTES
" LOS: 6 days   Patient Care Team:  Rafael Lyons MD as PCP - General (Internal Medicine)  Regis Mckeon MD as Consulting Physician (Radiation Oncology)  Carlton Casillas MD as Consulting Physician (Gastroenterology)  Susan Marcos APRN as Nurse Practitioner (Gastroenterology)    Chief Complaint: ESRD    Subjective     History of Present Illness  Pt is feeling a bit better.  Sitting in chair eating breakfast.  Yesterday, she underwent left upper extremity AV graft placement.    Subjective:  Symptoms:  Improved.  No shortness of breath, chest pain, chest pressure or anxiety.    Diet:  Adequate intake.  No nausea or vomiting.    Activity level: Impaired due to weakness.    Pain:  She reports no pain.        History taken from: patient chart    Objective     Vital Sign Min/Max for last 24 hours  Temp  Min: 97.8 °F (36.6 °C)  Max: 98.6 °F (37 °C)   BP  Min: 128/46  Max: 158/55   Pulse  Min: 89  Max: 97   Resp  Min: 12  Max: 22   SpO2  Min: 92 %  Max: 100 %   Flow (L/min)  Min: 3  Max: 3   Weight  Min: 72.5 kg (159 lb 13.3 oz)  Max: 72.5 kg (159 lb 13.3 oz)     Flowsheet Rows      Flowsheet Row First Filed Value   Admission Height 165.1 cm (65\") Documented at 11/25/2023 1304   Admission Weight 75.3 kg (166 lb 0.1 oz) Documented at 11/25/2023 1304            I/O this shift:  In: 360 [P.O.:360]  Out: -   I/O last 3 completed shifts:  In: 1640 [P.O.:340; I.V.:1300]  Out: 3375 [Urine:3325; Blood:50]    Objective:  General Appearance:  Comfortable, in no acute distress and not in pain.    Vital signs: (most recent): Blood pressure 133/41, pulse 89, temperature 98.1 °F (36.7 °C), temperature source Oral, resp. rate 19, height 165.1 cm (65\"), weight 72.5 kg (159 lb 13.3 oz), SpO2 97%, not currently breastfeeding.  Vital signs are normal.    Output: Producing urine.    HEENT: Normal HEENT exam.    Lungs:  Normal effort and normal respiratory rate.  There are decreased breath sounds.  No rales.    Heart: Normal " "rate.    Abdomen: Abdomen is soft.  Bowel sounds are normal.   There is no abdominal tenderness.     Extremities: Normal range of motion.  There is dependent edema.  (Left upper extremity AV graft in place, weak thrill, good bruit.  Mild bruising.)  Pulses: Distal pulses are intact.    Neurological: Patient is alert and oriented to person, place and time.    Pupils:  Pupils are equal, round, and reactive to light.    Skin:  Warm and dry.                Results Review:     I reviewed the patient's new clinical results.    WBC WBC   Date Value Ref Range Status   11/30/2023 6.35 3.40 - 10.80 10*3/mm3 Final      HGB Hemoglobin   Date Value Ref Range Status   11/30/2023 8.8 (L) 12.0 - 15.9 g/dL Final      HCT Hematocrit   Date Value Ref Range Status   11/30/2023 28.7 (L) 34.0 - 46.6 % Final      Platlets No results found for: \"LABPLAT\"   MCV MCV   Date Value Ref Range Status   11/30/2023 95.0 79.0 - 97.0 fL Final          Sodium Sodium   Date Value Ref Range Status   12/01/2023 136 136 - 145 mmol/L Final   11/30/2023 138 136 - 145 mmol/L Final      Potassium Potassium   Date Value Ref Range Status   12/01/2023 4.8 3.5 - 5.2 mmol/L Final   11/30/2023 4.2 3.5 - 5.2 mmol/L Final      Chloride Chloride   Date Value Ref Range Status   12/01/2023 100 98 - 107 mmol/L Final   11/30/2023 99 98 - 107 mmol/L Final      CO2 CO2   Date Value Ref Range Status   12/01/2023 24.6 22.0 - 29.0 mmol/L Final   11/30/2023 25.0 22.0 - 29.0 mmol/L Final      BUN BUN   Date Value Ref Range Status   12/01/2023 93 (H) 8 - 23 mg/dL Final   11/30/2023 106 (H) 8 - 23 mg/dL Final      Creatinine Creatinine   Date Value Ref Range Status   12/01/2023 2.63 (H) 0.57 - 1.00 mg/dL Final   11/30/2023 2.95 (H) 0.57 - 1.00 mg/dL Final      Calcium Calcium   Date Value Ref Range Status   12/01/2023 8.2 (L) 8.6 - 10.5 mg/dL Final   11/30/2023 8.6 8.6 - 10.5 mg/dL Final      PO4 No results found for: \"CAPO4\"   Albumin Albumin   Date Value Ref Range Status " "  11/30/2023 3.3 (L) 3.5 - 5.2 g/dL Final      Magnesium Magnesium   Date Value Ref Range Status   11/30/2023 1.9 1.6 - 2.4 mg/dL Final      Uric Acid No results found for: \"URICACID\"     Medication Review:   budesonide-formoterol, 2 puff, Inhalation, BID - RT  bumetanide, 3 mg, Oral, Daily  carvedilol, 25 mg, Oral, BID With Meals  cetirizine, 10 mg, Oral, Daily  cilostazol, 100 mg, Oral, BID  heparin (porcine), 5,000 Units, Subcutaneous, Q12H  hydrALAZINE, 25 mg, Oral, BID  ipratropium-albuterol, 3 mL, Nebulization, TID - RT  linagliptin, 5 mg, Oral, Daily  pantoprazole, 40 mg, Oral, BID AC  rosuvastatin, 20 mg, Oral, Nightly  sennosides-docusate, 1 tablet, Oral, Nightly  sevelamer, 800 mg, Oral, TID With Meals  sodium chloride, 10 mL, Intravenous, Q12H          Assessment & Plan       Acute exacerbation of CHF (congestive heart failure)    Essential hypertension    GERD (gastroesophageal reflux disease)    Stage 4 chronic kidney disease    Type 2 diabetes mellitus without complication    Acute hypoxic respiratory failure    COPD exacerbation    Hypoxia    Aortic stenosis, moderate      Assessment & Plan  - ESRD, progressive renal disease from diabetic nephropathy on a background of hypertension and congestive heart failure and bilateral renal artery stenosis.  Volume overload is slowly improving.  Status post left upper extremity AV graft, AcuSeal.  Will plan to initiate dialysis tomorrow for [place].  Outpatient dialysis set at [facility] in [place].  Discussed with patient what to expect starting dialysis.  Change IV Bumex to p.o.    If dialysis is tolerated tomorrow, patient can be discharged from renal standpoint and follow-up at dialysis clinic on Monday.  I appreciate vascular help.   - Hypertension, blood pressure is better with improved volume control.  - Bilateral renal artery stenosis, aldosterone/renin ratio was not elevated.  No intervention.  - Congestive heart failure, diastolic dysfunction, known " preserved LV function.  - History of lung cancer 2005.  Followed by pulmonary.  - Diabetes with complications.  - Anemia, severe.  tsat ok.  Started CASSANDRA, goal hemoglobin above 10.  Change Epogen to IV form with dialysis.  - Hyperkalemia, secondary to renal failure, combination of diuretics should be helpful, on low potassium diet.    - Metabolic acidosis, mild,   On oral sodium bicarb.  Improved now.  Dialysis orders called to outpatient clinic.  Please call with any questions.      Edi García MD  12/01/23  13:23 EST

## 2023-12-01 NOTE — PROGRESS NOTES
Pulmonary / Critical Care Progress Note      Patient Name: Charlette Rai  : 1935  MRN: [id number]  Attending:  Rafael Lyons MD  Date of admission: 2023    Subjective   Subjective   Follow-up for hypoxia    Over past 24 hours:  Volume status improving as his respiratory status  On 3 L of oxygen  Had HD cath placed yesterday.  Plan to access tomorrow for dialysis  Dyspnea and orthopnea improving  No coughing or wheezing  No chest pain or hemoptysis  No nausea, fevers or chills      Objective   Objective     Vitals:   Temp:  [97.5 °F (36.4 °C)-98.6 °F (37 °C)] 98.6 °F (37 °C)  Heart Rate:  [89-97] 90  Resp:  [12-22] 18  BP: (128-158)/(41-73) 133/48  Flow (L/min):  [3] 3  FiO2 (%):  [50 %] 50 %    Physical Exam   Vital Signs Reviewed   General:  WDWN, Alert, NAD.    HEENT:  PERRL, EOMI.  OP, nares clear  Chest:  good aeration, decreasing crackles and rhonchi bilaterally, tympanic to percussion bilaterally, no work of breathing noted  CV: RRR, no MGR, pulses 2+, equal.  Abd:  Soft, NT, ND, + BS, no HSM  EXT:  no clubbing, no cyanosis, no edema  Neuro:  A&Ox3, CN grossly intact, no focal deficits.  Skin: No rashes or lesions noted      Result Review    Result Review:  I have personally reviewed the results from the time of this admission to 2023 08:24 EST and agree with these findings:  [x]  Laboratory  [x]  Microbiology  [x]  Radiology  [x]  EKG/Telemetry   [x]  Cardiology/Vascular   []  Pathology  []  Old records  []  Other:  Most notable findings include:       Lab 23  0453 23  0504 23  0415 23  1700 23  0428 23  0340 23  2154 23  1313   WBC  --  6.35 6.44  --  5.63 4.27  --  9.50   HEMOGLOBIN  --  8.8* 7.8*  --  7.5* 7.8*  --  8.1*   HEMATOCRIT  --  28.7* 27.0*  --  25.8* 25.8*  --  26.9*   PLATELETS  --  238 206  --  214 194  --  191   SODIUM 136 138 135*  --  136 139  --  140   SODIUM, ARTERIAL  --   --   --  137.6  --   --  139.4  --    POTASSIUM  4.8 4.2 5.5*  --  5.9* 5.6*  --  5.3*   CHLORIDE 100 99 102  --  103 107  --  107   CO2 24.6 25.0 18.4*  --  18.9* 18.1*  --  20.0*   BUN 93* 106* 103*  --  99* 88*  --  80*   CREATININE 2.63* 2.95* 3.25*  --  3.21* 2.80*  --  2.92*   GLUCOSE 131* 92 182*  --  210* 175*  --  204*   GLUCOSE, ARTERIAL  --   --   --  175*  --   --  173*  --    CALCIUM 8.2* 8.6 7.7*  --  7.8* 8.3*  --  8.4*   PHOSPHORUS  --  4.7* 5.9*  --   --   --   --   --    TOTAL PROTEIN  --   --   --   --  6.1  --   --  6.6   ALBUMIN  --  3.3* 3.2*  --  3.2*  --   --  3.5   GLOBULIN  --   --   --   --  2.9  --   --  3.1     CT Chest Without Contrast Diagnostic    Result Date: 11/28/2023  PROCEDURE: CT CHEST WO CONTRAST DIAGNOSTIC  COMPARISON: [facility], CR, XR CHEST 1 VW, 11/25/2023, 13:23.  [facility], CR, XR CHEST 1 VW, 11/09/2023, 4:37.  [facility], CR, XR CHEST 1 VW, 11/27/2023, 10:23.  [facility], CT, CT CHEST WO CONTRAST DIAGNOSTIC, 3/01/2023, 15:47.  INDICATIONS: hypoxia, worsening infiltrates  PROTOCOL:   Standard imaging protocol performed    RADIATION:   DLP: 117 mGy*cm   Automated exposure control was utilized to minimize radiation dose.  TECHNIQUE: Axial images of the chest without intravenous contrast.  FINDINGS: There is a small right pleural effusion.  There is mild cardiac enlargement.  The thoracic aorta has a normal caliber.  There is dense atherosclerotic calcification.  There is coronary artery calcification.  Stable irregular consolidation inferiorly in the right upper lobe consistent with treatment related change.  There are postoperative changes superiorly in the left hemithorax.  There are areas of airspace consolidation in the lower lobes.  Calcified pleural plaques are present in the left hemithorax.  Endobronchial secretion is present in the right lower lobe.  There are areas of mild intralobular septal thickening.  Degenerative changes are present in the  thoracic spine.  There are probable simple and proteinaceous cyst in the visualized kidneys.  IMPRESSION:  1. Areas of airspace consolidation in the lower lobes suspicious for pneumonia.  Areas of endobronchial secretion are present in the right lower lobe.  2. Small right pleural effusion.  3. Areas of intraobular septal thickening may represent mild pulmonary edema.   YARELIS LUCAS MD       Electronically Signed and Approved By: YARELIS LUCAS MD on 11/28/2023 at 15:32                Assessment & Plan   Assessment / Plan     Active Hospital Problems:  Active Hospital Problems    Diagnosis     Aortic stenosis, moderate     COPD exacerbation     Hypoxia     Acute hypoxic respiratory failure     Acute exacerbation of CHF (congestive heart failure)     Essential hypertension     GERD (gastroesophageal reflux disease)     Stage 4 chronic kidney disease     Type 2 diabetes mellitus without complication        Impression:  Acute hypoxemic respiratory failure  Acute decompensated diastolic congestive heart failure  Volume overload  Acute cardiogenic pulmonary edema  End-stage renal disease.  Approaching hemodialysis  COPD without exacerbation  Mucous plugging  History of lung cancer status post lobectomy on the left in 2005  Essential hypertension  Hyperkalemia     Plan:  Overall improving with improvement in volume status  Will follow-up with us as an outpatient and repeat noncontrast chest CT in 3 months  Continue high-dose IV Bumex.  Will spot dose metolazone x 1 today  HD graft placed by vascular surgery yesterday.  Appreciate assistance.  Will access tomorrow and try to dialyze  Timing of dialysis per nephrology.  Appreciate assistance  Continue Symbicort, DuoNebs and bronchopulmonary hygiene  Wean O2 to keep SPO2 greater than 90%       DVT prophylaxis:  Medical DVT prophylaxis orders are present.    CODE STATUS:   Medical Intervention Limits: NO intubation (DNI)  Level Of Support Discussed With: Patient  Code  Status (Patient has no pulse and is not breathing): No CPR (Do Not Attempt to Resuscitate)  Medical Interventions (Patient has pulse or is breathing): Limited Support      Labs, imaging, microbiology, notes and medications personally reviewed  Discussed with primary    Electronically signed by Clement Motta MD, 12/01/23, 11:22 AM EST.

## 2023-12-01 NOTE — PROGRESS NOTES
[facility]     Progress Note    Patient Name: Charlette Rai  : 1935  MRN: [id number]  Primary Care Physician:  Rafael Lyons MD  Date of admission: 2023      Subjective   Brief summary.  Admitted with hypoxia and shortness of breath      HPI:  Complains of ears stopped up especially left would like to have ear flush done.  No other complaints breathing improving.  Swelling in the arm coming back.  Waiting for dialysis.    Review of Systems     Fatigue and shortness of breath, anxious.  No chest pain, no fever  Head congestion and stuffiness      Objective     Vitals:   Temp:  [97.3 °F (36.3 °C)-98.6 °F (37 °C)] 97.3 °F (36.3 °C)  Heart Rate:  [85-97] 85  Resp:  [12-22] 20  BP: (123-158)/(41-73) 123/41  Flow (L/min):  [3] 3  FiO2 (%):  [50 %] 50 %    Physical Exam :     Elderly female not in acute distress.  Minimal head congestion noted, no sinus tenderness.    Heart regular.  Lungs diminished breath sounds with few rhonchi.  Abdomen soft nontender.    Extremities with trace of edema       Result Review:  I have personally reviewed the results from the time of this admission to 2023 17:37 EST and agree with these findings:  [x]  Laboratory  []  Microbiology  []  Radiology  []  EKG/Telemetry   []  Cardiology/Vascular   []  Pathology  []  Old records  []  Other:        Assessment / Plan       Active Hospital Problems:  Active Hospital Problems    Diagnosis     Aortic stenosis, moderate     COPD exacerbation     Hypoxia     Acute hypoxic respiratory failure     Acute exacerbation of CHF (congestive heart failure)     Essential hypertension     GERD (gastroesophageal reflux disease)     Stage 4 chronic kidney disease     Type 2 diabetes mellitus without complication        Plan:   For dialysis tomorrow  Continue diuresis with diuretics now  COPD improving,   Continue neb treatments  For head congestion we will add Zyrtec  PT OT as tolerates  Appreciate consultants help      DVT  prophylaxis:  Medical DVT prophylaxis orders are present.    CODE STATUS:   Medical Intervention Limits: NO intubation (DNI)  Level Of Support Discussed With: Patient  Code Status (Patient has no pulse and is not breathing): No CPR (Do Not Attempt to Resuscitate)  Medical Interventions (Patient has pulse or is breathing): Limited Support                    Electronically signed by Rafael Lyons MD, 12/01/23, 5:39 PM EST.

## 2023-12-01 NOTE — PROGRESS NOTES
[facility]     Progress Note    Patient Name: Charlette Rai  : 1935  MRN: [id number]  Primary Care Physician:  Rafael Lyons MD  Date of admission: 2023    Subjective   Subjective     POD #1 status post creation of left arm arteriovenous graft    Doing well.  No complaints.  Left hand doing okay.    Objective   Objective     Vitals:   Temp:  [97.5 °F (36.4 °C)-98.6 °F (37 °C)] 98.1 °F (36.7 °C)  Heart Rate:  [89-97] 89  Resp:  [12-22] 18  BP: (128-158)/(41-73) 151/47  Flow (L/min):  [3] 3  FiO2 (%):  [50 %] 50 %    Physical Exam   General: Alert, no acute distress  Wounds: Healing well, no signs of infection.  Moderate edema at the incision sites.  AV graft: Satisfactory thrill.  Left hand: Well-perfused.  Grossly normal color, temperature and motor function.        Assessment & Plan   Assessment / Plan     Assessment/Plan:    Satisfactory progress following creation of left arm arteriovenous graft for early access.  Okay to attempt access tomorrow.  Will follow peripherally.    Active Hospital Problems:  Active Hospital Problems    Diagnosis     Aortic stenosis, moderate     COPD exacerbation     Hypoxia     Acute hypoxic respiratory failure     Acute exacerbation of CHF (congestive heart failure)     Essential hypertension     GERD (gastroesophageal reflux disease)     Stage 4 chronic kidney disease     Type 2 diabetes mellitus without complication            Electronically signed by Wan Barksdale MD, 23, 11:14 AM EST.

## 2023-12-01 NOTE — PROGRESS NOTES
CARDIOLOGY  INPATIENT PROGRESS NOTE                [address]    12/1/2023      PATIENT IDENTIFICATION:   Name:  Charlette Rai      MRN:  [id number]     88 y.o.  female                 SUBJECTIVE:   Patient status post dialysis catheter placement doing well this morning still weak and short of breath no new events or problems overnight  OBJECTIVE:  Vitals:    12/01/23 0500 12/01/23 0727 12/01/23 0731 12/01/23 0734   BP:    151/47   BP Location:    Right arm   Patient Position:    Lying   Pulse:  90 90 89   Resp:  22 18 18   Temp:    98.1 °F (36.7 °C)   TempSrc:    Oral   SpO2:  95% 94% 100%   Weight: 72.5 kg (159 lb 13.3 oz)      Height:               Body mass index is 26.6 kg/m².    Intake/Output Summary (Last 24 hours) at 12/1/2023 0931  Last data filed at 11/30/2023 2213  Gross per 24 hour   Intake 1400 ml   Output 1625 ml   Net -225 ml       Telemetry: Normal sinus rhythm with some intermittent PVCs    Physical Exam  General Appearance:   no acute distress  Alert and oriented x3  HENT:   lips not cyanotic  Atraumatic  Neck:  No jvd   supple  Respiratory:  no respiratory distress  normal breath sounds  no rales  Cardiovascular:    regular rhythm  no S3, no S4   no murmur  no rub  lower extremity edema: +1  Skin:   warm, dry  No rashes      No Known Allergies  Scheduled meds:  budesonide-formoterol, 2 puff, Inhalation, BID - RT  bumetanide, 4 mg, Intravenous, Q8H  carvedilol, 25 mg, Oral, BID With Meals  cetirizine, 10 mg, Oral, Daily  cilostazol, 100 mg, Oral, BID  epoetin mita/mita-epbx, 20,000 Units, Intravenous, Weekly  heparin (porcine), 5,000 Units, Subcutaneous, Q12H  hydrALAZINE, 25 mg, Oral, BID  ipratropium-albuterol, 3 mL, Nebulization, TID - RT  linagliptin, 5 mg, Oral, Daily  metOLazone, 10 mg, Oral, Once  pantoprazole, 40 mg, Oral, BID AC  rosuvastatin, 20 mg, Oral, Nightly  sennosides-docusate, 1 tablet, Oral, Nightly  sevelamer, 800 mg, Oral, TID With Meals  sodium  "chloride, 10 mL, Intravenous, Q12H      IV meds:                         Data Review:  CBC          11/27/2023    04:28 11/28/2023    04:15 11/30/2023    05:04   CBC   WBC 5.63  6.44  6.35    RBC 2.57  2.65  3.02    Hemoglobin 7.5  7.8  8.8    Hematocrit 25.8  27.0  28.7    .4  101.9  95.0    MCH 29.2  29.4  29.1    MCHC 29.1  28.9  30.7    RDW 13.2  13.2  12.9    Platelets 214  206  238      CMP          11/28/2023    04:15 11/30/2023    05:04 12/1/2023    04:53   CMP   Glucose 182  92  131      106  93    Creatinine 3.25  2.95  2.63    EGFR 13.4  15.0  17.2    Sodium 135  138  136    Potassium 5.5  4.2  4.8    Chloride 102  99  100    Calcium 7.7  8.6  8.2    Albumin 3.2  3.3     BUN/Creatinine Ratio 31.7  35.9  35.4    Anion Gap 14.6  14.0  11.4       CARDIAC LABS:      Lab 11/28/23  0415 11/25/23  1541 11/25/23  1313   PROBNP 26,260.0*  --  7,806.0*   HSTROP T  --  81* 83*        No results found for: \"DIGOXIN\"   Lab Results   Component Value Date    TSH 1.650 10/29/2023           Invalid input(s): \"LDLCALC\"  No results found for: \"POCTROP\"  Lab Results   Component Value Date    TROPONINT 81 (C) 11/25/2023   (  Lab Results   Component Value Date    MG 1.9 11/30/2023     Results for orders placed during the hospital encounter of 10/29/23    Adult Transthoracic Echo Complete W/ Cont if Necessary Per Protocol    Interpretation Summary    Left ventricular systolic function is normal. Calculated left ventricular EF = 56.8%    Left ventricular wall thickness is consistent with mild asymmetric hypertrophy.    Left ventricular diastolic function is consistent with (grade I) impaired relaxation.    Left atrial volume is moderately increased.    Moderate aortic valve stenosis is present.    Estimated right ventricular systolic pressure from tricuspid regurgitation is normal (<35 mmHg).    Mild dilation of the aortic root is present.           ASSESSMENT:    Acute exacerbation of CHF (congestive heart " failure)    Essential hypertension    Aortic stenosis, moderate    Stage 4 chronic kidney disease    Type 2 diabetes mellitus without complication    GERD (gastroesophageal reflux disease)    Acute hypoxic respiratory failure    COPD exacerbation    Hypoxia        PLAN:  1.  Continue with IV Bumex and Zaroxolyn per nephrology patient to start on dialysis tomorrow  2.  Coreg 25 twice daily blood pressure reasonably controlled  Intensive any therapy currently until see how patient tolerates dialysis.  Will continue to follow from a distance please feel free to call with any further questions          León Sanchez MD  12/1/2023    09:31 EST

## 2023-12-02 LAB
ALBUMIN SERPL-MCNC: 3.2 G/DL (ref 3.5–5.2)
ANION GAP SERPL CALCULATED.3IONS-SCNC: 10.6 MMOL/L (ref 5–15)
BUN SERPL-MCNC: 99 MG/DL (ref 8–23)
BUN/CREAT SERPL: 34.4 (ref 7–25)
CALCIUM SPEC-SCNC: 8.5 MG/DL (ref 8.6–10.5)
CHLORIDE SERPL-SCNC: 100 MMOL/L (ref 98–107)
CO2 SERPL-SCNC: 27.4 MMOL/L (ref 22–29)
CREAT SERPL-MCNC: 2.88 MG/DL (ref 0.57–1)
DEPRECATED RDW RBC AUTO: 45 FL (ref 37–54)
EGFRCR SERPLBLD CKD-EPI 2021: 15.4 ML/MIN/1.73
ERYTHROCYTE [DISTWIDTH] IN BLOOD BY AUTOMATED COUNT: 13 % (ref 12.3–15.4)
GLUCOSE SERPL-MCNC: 91 MG/DL (ref 65–99)
HCT VFR BLD AUTO: 25.7 % (ref 34–46.6)
HGB BLD-MCNC: 7.9 G/DL (ref 12–15.9)
MCH RBC QN AUTO: 29.7 PG (ref 26.6–33)
MCHC RBC AUTO-ENTMCNC: 30.7 G/DL (ref 31.5–35.7)
MCV RBC AUTO: 96.6 FL (ref 79–97)
PHOSPHATE SERPL-MCNC: 3.7 MG/DL (ref 2.5–4.5)
PLATELET # BLD AUTO: 197 10*3/MM3 (ref 140–450)
PMV BLD AUTO: 10.2 FL (ref 6–12)
POTASSIUM SERPL-SCNC: 4.3 MMOL/L (ref 3.5–5.2)
RBC # BLD AUTO: 2.66 10*6/MM3 (ref 3.77–5.28)
SODIUM SERPL-SCNC: 138 MMOL/L (ref 136–145)
WBC NRBC COR # BLD AUTO: 7.35 10*3/MM3 (ref 3.4–10.8)

## 2023-12-02 PROCEDURE — 94799 UNLISTED PULMONARY SVC/PX: CPT

## 2023-12-02 PROCEDURE — 94664 DEMO&/EVAL PT USE INHALER: CPT

## 2023-12-02 PROCEDURE — 80069 RENAL FUNCTION PANEL: CPT | Performed by: INTERNAL MEDICINE

## 2023-12-02 PROCEDURE — 85027 COMPLETE CBC AUTOMATED: CPT | Performed by: INTERNAL MEDICINE

## 2023-12-02 PROCEDURE — 99232 SBSQ HOSP IP/OBS MODERATE 35: CPT | Performed by: INTERNAL MEDICINE

## 2023-12-02 PROCEDURE — 25010000002 HEPARIN (PORCINE) PER 1000 UNITS: Performed by: SURGERY

## 2023-12-02 RX ORDER — LIDOCAINE 40 MG/G
1 CREAM TOPICAL AS NEEDED
Status: DISCONTINUED | OUTPATIENT
Start: 2023-12-02 | End: 2023-12-05 | Stop reason: HOSPADM

## 2023-12-02 RX ADMIN — LINAGLIPTIN 5 MG: 5 TABLET, FILM COATED ORAL at 08:25

## 2023-12-02 RX ADMIN — CILOSTAZOL 100 MG: 100 TABLET ORAL at 08:25

## 2023-12-02 RX ADMIN — SEVELAMER CARBONATE 800 MG: 800 TABLET, FILM COATED ORAL at 08:25

## 2023-12-02 RX ADMIN — PANTOPRAZOLE SODIUM 40 MG: 40 TABLET, DELAYED RELEASE ORAL at 17:27

## 2023-12-02 RX ADMIN — IPRATROPIUM BROMIDE AND ALBUTEROL SULFATE 3 ML: .5; 3 SOLUTION RESPIRATORY (INHALATION) at 07:35

## 2023-12-02 RX ADMIN — Medication 10 ML: at 20:49

## 2023-12-02 RX ADMIN — CETIRIZINE HYDROCHLORIDE 10 MG: 10 TABLET, FILM COATED ORAL at 08:25

## 2023-12-02 RX ADMIN — Medication 10 ML: at 08:25

## 2023-12-02 RX ADMIN — CILOSTAZOL 100 MG: 100 TABLET ORAL at 20:49

## 2023-12-02 RX ADMIN — CARVEDILOL 25 MG: 12.5 TABLET, FILM COATED ORAL at 17:27

## 2023-12-02 RX ADMIN — TRAMADOL HYDROCHLORIDE 50 MG: 50 TABLET ORAL at 20:49

## 2023-12-02 RX ADMIN — LIDOCAINE 4% 1 APPLICATION: 4 CREAM TOPICAL at 12:48

## 2023-12-02 RX ADMIN — HEPARIN SODIUM 5000 UNITS: 5000 INJECTION INTRAVENOUS; SUBCUTANEOUS at 08:24

## 2023-12-02 RX ADMIN — FLUTICASONE PROPIONATE 2 SPRAY: 50 SPRAY, METERED NASAL at 20:48

## 2023-12-02 RX ADMIN — HEPARIN SODIUM 5000 UNITS: 5000 INJECTION INTRAVENOUS; SUBCUTANEOUS at 20:49

## 2023-12-02 RX ADMIN — BUDESONIDE AND FORMOTEROL FUMARATE DIHYDRATE 2 PUFF: 160; 4.5 AEROSOL RESPIRATORY (INHALATION) at 18:48

## 2023-12-02 RX ADMIN — IPRATROPIUM BROMIDE AND ALBUTEROL SULFATE 3 ML: .5; 3 SOLUTION RESPIRATORY (INHALATION) at 18:48

## 2023-12-02 RX ADMIN — HYDRALAZINE HYDROCHLORIDE 25 MG: 25 TABLET, FILM COATED ORAL at 08:25

## 2023-12-02 RX ADMIN — PANTOPRAZOLE SODIUM 40 MG: 40 TABLET, DELAYED RELEASE ORAL at 08:25

## 2023-12-02 RX ADMIN — SEVELAMER CARBONATE 800 MG: 800 TABLET, FILM COATED ORAL at 12:50

## 2023-12-02 RX ADMIN — SEVELAMER CARBONATE 800 MG: 800 TABLET, FILM COATED ORAL at 17:27

## 2023-12-02 RX ADMIN — BUDESONIDE AND FORMOTEROL FUMARATE DIHYDRATE 2 PUFF: 160; 4.5 AEROSOL RESPIRATORY (INHALATION) at 07:35

## 2023-12-02 RX ADMIN — IPRATROPIUM BROMIDE AND ALBUTEROL SULFATE 3 ML: .5; 3 SOLUTION RESPIRATORY (INHALATION) at 11:28

## 2023-12-02 RX ADMIN — ROSUVASTATIN 20 MG: 20 TABLET, FILM COATED ORAL at 20:49

## 2023-12-02 RX ADMIN — HYDRALAZINE HYDROCHLORIDE 25 MG: 25 TABLET, FILM COATED ORAL at 20:49

## 2023-12-02 RX ADMIN — CARVEDILOL 25 MG: 12.5 TABLET, FILM COATED ORAL at 08:25

## 2023-12-02 RX ADMIN — BUMETANIDE 3 MG: 1 TABLET ORAL at 08:24

## 2023-12-02 NOTE — PROGRESS NOTES
" LOS: 7 days   Patient Care Team:  Rafael Lyons MD as PCP - General (Internal Medicine)  Regis Mckeon MD as Consulting Physician (Radiation Oncology)  Carlton Casillas MD as Consulting Physician (Gastroenterology)  Susan Marcos APRN as Nurse Practitioner (Gastroenterology)    Chief Complaint: ESRD    Subjective     History of Present Illness  Pt is feeling a bit better.  No new complaint.  Seen in dialysis.  First treatment today.    Subjective:  Symptoms:  Improved.  No shortness of breath, cough, chest pain, chest pressure or anxiety.    Diet:  Adequate intake.  No nausea or vomiting.    Activity level: Impaired due to weakness.    Pain:  She reports no pain.        History taken from: patient chart    Objective     Vital Sign Min/Max for last 24 hours  Temp  Min: 97.3 °F (36.3 °C)  Max: 98.1 °F (36.7 °C)   BP  Min: 123/43  Max: 145/58   Pulse  Min: 81  Max: 95   Resp  Min: 20  Max: 20   SpO2  Min: 90 %  Max: 100 %   Flow (L/min)  Min: 2  Max: 3   No data recorded     Flowsheet Rows      Flowsheet Row First Filed Value   Admission Height 165.1 cm (65\") Documented at 11/25/2023 1304   Admission Weight 75.3 kg (166 lb 0.1 oz) Documented at 11/25/2023 1304            I/O this shift:  In: 240 [P.O.:240]  Out: -   I/O last 3 completed shifts:  In: 1360 [P.O.:1360]  Out: 850 [Urine:850]    Objective:  General Appearance:  Comfortable, in no acute distress and not in pain.    Vital signs: (most recent): Blood pressure 123/43, pulse 90, temperature 97.7 °F (36.5 °C), temperature source Oral, resp. rate 20, height 165.1 cm (65\"), weight 72.5 kg (159 lb 13.3 oz), SpO2 98%, not currently breastfeeding.  Vital signs are normal.  No fever.    Output: Producing urine.    HEENT: Normal HEENT exam.    Lungs:  Normal effort and normal respiratory rate.  There are decreased breath sounds.  No rales.    Heart: Normal rate.    Abdomen: Abdomen is soft.  Bowel sounds are normal.   There is no abdominal tenderness.   " "  Extremities: Normal range of motion.  There is dependent edema.  (Left upper extremity AV graft in place, weak thrill, good bruit.  Mild bruising.)  Pulses: Distal pulses are intact.    Neurological: Patient is alert and oriented to person, place and time.    Pupils:  Pupils are equal, round, and reactive to light.    Skin:  Warm and dry.                Results Review:     I reviewed the patient's new clinical results.    WBC WBC   Date Value Ref Range Status   12/02/2023 7.35 3.40 - 10.80 10*3/mm3 Final   11/30/2023 6.35 3.40 - 10.80 10*3/mm3 Final      HGB Hemoglobin   Date Value Ref Range Status   12/02/2023 7.9 (L) 12.0 - 15.9 g/dL Final   11/30/2023 8.8 (L) 12.0 - 15.9 g/dL Final      HCT Hematocrit   Date Value Ref Range Status   12/02/2023 25.7 (L) 34.0 - 46.6 % Final   11/30/2023 28.7 (L) 34.0 - 46.6 % Final      Platlets No results found for: \"LABPLAT\"   MCV MCV   Date Value Ref Range Status   12/02/2023 96.6 79.0 - 97.0 fL Final   11/30/2023 95.0 79.0 - 97.0 fL Final          Sodium Sodium   Date Value Ref Range Status   12/02/2023 138 136 - 145 mmol/L Final   12/01/2023 136 136 - 145 mmol/L Final   11/30/2023 138 136 - 145 mmol/L Final      Potassium Potassium   Date Value Ref Range Status   12/02/2023 4.3 3.5 - 5.2 mmol/L Final   12/01/2023 4.8 3.5 - 5.2 mmol/L Final   11/30/2023 4.2 3.5 - 5.2 mmol/L Final      Chloride Chloride   Date Value Ref Range Status   12/02/2023 100 98 - 107 mmol/L Final   12/01/2023 100 98 - 107 mmol/L Final   11/30/2023 99 98 - 107 mmol/L Final      CO2 CO2   Date Value Ref Range Status   12/02/2023 27.4 22.0 - 29.0 mmol/L Final   12/01/2023 24.6 22.0 - 29.0 mmol/L Final   11/30/2023 25.0 22.0 - 29.0 mmol/L Final      BUN BUN   Date Value Ref Range Status   12/02/2023 99 (H) 8 - 23 mg/dL Final   12/01/2023 93 (H) 8 - 23 mg/dL Final   11/30/2023 106 (H) 8 - 23 mg/dL Final      Creatinine Creatinine   Date Value Ref Range Status   12/02/2023 2.88 (H) 0.57 - 1.00 mg/dL Final " "  12/01/2023 2.63 (H) 0.57 - 1.00 mg/dL Final   11/30/2023 2.95 (H) 0.57 - 1.00 mg/dL Final      Calcium Calcium   Date Value Ref Range Status   12/02/2023 8.5 (L) 8.6 - 10.5 mg/dL Final   12/01/2023 8.2 (L) 8.6 - 10.5 mg/dL Final   11/30/2023 8.6 8.6 - 10.5 mg/dL Final      PO4 No results found for: \"CAPO4\"   Albumin Albumin   Date Value Ref Range Status   12/02/2023 3.2 (L) 3.5 - 5.2 g/dL Final   11/30/2023 3.3 (L) 3.5 - 5.2 g/dL Final      Magnesium Magnesium   Date Value Ref Range Status   11/30/2023 1.9 1.6 - 2.4 mg/dL Final      Uric Acid No results found for: \"URICACID\"     Medication Review:   budesonide-formoterol, 2 puff, Inhalation, BID - RT  bumetanide, 3 mg, Oral, Daily  carvedilol, 25 mg, Oral, BID With Meals  cetirizine, 10 mg, Oral, Daily  cilostazol, 100 mg, Oral, BID  fluticasone, 2 spray, Each Nare, Nightly  heparin (porcine), 5,000 Units, Subcutaneous, Q12H  hydrALAZINE, 25 mg, Oral, BID  ipratropium-albuterol, 3 mL, Nebulization, TID - RT  linagliptin, 5 mg, Oral, Daily  pantoprazole, 40 mg, Oral, BID AC  rosuvastatin, 20 mg, Oral, Nightly  sennosides-docusate, 1 tablet, Oral, Nightly  sevelamer, 800 mg, Oral, TID With Meals  sodium chloride, 10 mL, Intravenous, Q12H          Assessment & Plan       Acute exacerbation of CHF (congestive heart failure)    Essential hypertension    GERD (gastroesophageal reflux disease)    Stage 4 chronic kidney disease    Type 2 diabetes mellitus without complication    Acute hypoxic respiratory failure    COPD exacerbation    Hypoxia    Aortic stenosis, moderate      Assessment & Plan  - ESRD, progressive renal disease from diabetic nephropathy on a background of hypertension and congestive heart failure and bilateral renal artery stenosis.  Volume overload is slowly improving.  Status post left upper extremity AV graft, AcuSeal.  First dialysis today. Outpatient dialysis set at [facility] in [place].  Discussed with patient what to expect starting dialysis.  Continue " p.o. Bumex.  If dialysis is tolerated today, patient can be discharged from renal standpoint and follow-up at dialysis clinic on Monday.  I appreciate vascular help.   - Hypertension, blood pressure is better with improved volume control.  - Bilateral renal artery stenosis, aldosterone/renin ratio was not elevated.  No intervention.  - Congestive heart failure, diastolic dysfunction, known preserved LV function.  - History of lung cancer 2005.  Followed by pulmonary.  - Diabetes with complications.  - Anemia, severe.  tsat ok.  Started CASSANDRA, goal hemoglobin above 10.  Change Epogen to IV form with dialysis.  - Hyperkalemia, secondary to renal failure, combination of diuretics should be helpful, on low potassium diet.    - Metabolic acidosis, mild,   On oral sodium bicarb.  Improved now.  Dialysis orders called to outpatient clinic.  Discussed with dialysis staff.    Please call with any questions.      Edi García MD  12/02/23  14:11 EST

## 2023-12-02 NOTE — PROGRESS NOTES
Pulmonary / Critical Care Progress Note      Patient Name: Charlette Rai  : 1935  MRN: [id number]  Attending:  Rafael Lyons MD  Date of admission: 2023    Subjective   Subjective   Follow-up for hypoxia    Over past 24 hours:  Volume status improving as his respiratory status  On 2 L of oxygen  Feeling better      Objective   Objective     Vitals:   Temp:  [97.3 °F (36.3 °C)-98.1 °F (36.7 °C)] 97.7 °F (36.5 °C)  Heart Rate:  [81-95] 90  Resp:  [20] 20  BP: (123-145)/(43-58) 123/43  Flow (L/min):  [2-3] 2    Physical Exam   2023    General:  WDWN, Alert, NAD.    HEENT:  PERRL, EOMI.  OP, nares clear  Chest:  good aeration, decreasing crackles and rhonchi bilaterally, tympanic to percussion bilaterally, no work of breathing noted  CV: RRR, no MGR, pulses 2+, equal.  Abd:  Soft, NT, ND, + BS, no HSM  EXT:  no clubbing, no cyanosis, no edema  Neuro:  A&Ox3, CN grossly intact, no focal deficits.  Skin: No rashes or lesions noted      Result Review    Result Review:  I have personally reviewed the results from the time of this admission to 2023 15:31 EST and agree with these findings:  [x]  Laboratory  [x]  Microbiology  [x]  Radiology  [x]  EKG/Telemetry   [x]  Cardiology/Vascular   []  Pathology  []  Old records  []  Other:  Most notable findings include:       Lab 23  0530 23  0453 23  0504 23  0415 23  1700 23  0428 23  0340   WBC 7.35  --  6.35 6.44  --  5.63 4.27   HEMOGLOBIN 7.9*  --  8.8* 7.8*  --  7.5* 7.8*   HEMATOCRIT 25.7*  --  28.7* 27.0*  --  25.8* 25.8*   PLATELETS 197  --  238 206  --  214 194   SODIUM 138 136 138 135*  --  136 139   SODIUM, ARTERIAL  --   --   --   --  137.6  --   --    POTASSIUM 4.3 4.8 4.2 5.5*  --  5.9* 5.6*   CHLORIDE 100 100 99 102  --  103 107   CO2 27.4 24.6 25.0 18.4*  --  18.9* 18.1*   BUN 99* 93* 106* 103*  --  99* 88*   CREATININE 2.88* 2.63* 2.95* 3.25*  --  3.21* 2.80*   GLUCOSE 91 131* 92 182*  --  210* 175*    GLUCOSE, ARTERIAL  --   --   --   --  175*  --   --    CALCIUM 8.5* 8.2* 8.6 7.7*  --  7.8* 8.3*   PHOSPHORUS 3.7  --  4.7* 5.9*  --   --   --    TOTAL PROTEIN  --   --   --   --   --  6.1  --    ALBUMIN 3.2*  --  3.3* 3.2*  --  3.2*  --    GLOBULIN  --   --   --   --   --  2.9  --      CT Chest Without Contrast Diagnostic    Result Date: 11/28/2023  PROCEDURE: CT CHEST WO CONTRAST DIAGNOSTIC  COMPARISON: [facility], CR, XR CHEST 1 VW, 11/25/2023, 13:23.  [facility], CR, XR CHEST 1 VW, 11/09/2023, 4:37.  [facility], CR, XR CHEST 1 VW, 11/27/2023, 10:23.  [facility], CT, CT CHEST WO CONTRAST DIAGNOSTIC, 3/01/2023, 15:47.  INDICATIONS: hypoxia, worsening infiltrates  PROTOCOL:   Standard imaging protocol performed    RADIATION:   DLP: 117 mGy*cm   Automated exposure control was utilized to minimize radiation dose.  TECHNIQUE: Axial images of the chest without intravenous contrast.  FINDINGS: There is a small right pleural effusion.  There is mild cardiac enlargement.  The thoracic aorta has a normal caliber.  There is dense atherosclerotic calcification.  There is coronary artery calcification.  Stable irregular consolidation inferiorly in the right upper lobe consistent with treatment related change.  There are postoperative changes superiorly in the left hemithorax.  There are areas of airspace consolidation in the lower lobes.  Calcified pleural plaques are present in the left hemithorax.  Endobronchial secretion is present in the right lower lobe.  There are areas of mild intralobular septal thickening.  Degenerative changes are present in the thoracic spine.  There are probable simple and proteinaceous cyst in the visualized kidneys.  IMPRESSION:  1. Areas of airspace consolidation in the lower lobes suspicious for pneumonia.  Areas of endobronchial secretion are present in the right lower lobe.  2. Small right pleural effusion.  3. Areas of  intraobular septal thickening may represent mild pulmonary edema.   YARELIS LUCAS MD       Electronically Signed and Approved By: YARELIS LUCAS MD on 11/28/2023 at 15:32                Assessment & Plan   Assessment / Plan     Active Hospital Problems:  Active Hospital Problems    Diagnosis     Aortic stenosis, moderate     COPD exacerbation     Hypoxia     Acute hypoxic respiratory failure     Acute exacerbation of CHF (congestive heart failure)     Essential hypertension     GERD (gastroesophageal reflux disease)     Stage 4 chronic kidney disease     Type 2 diabetes mellitus without complication        Impression:  Acute hypoxemic respiratory failure  Acute decompensated diastolic congestive heart failure  Volume overload  Acute cardiogenic pulmonary edema  End-stage renal disease.  Approaching hemodialysis  COPD without exacerbation  Mucous plugging  History of lung cancer status post lobectomy on the left in 2005  Essential hypertension  Hyperkalemia     Plan:  Overall improving with improvement in volume status  Will follow-up with us as an outpatient and repeat noncontrast chest CT in 3 months due to the endobronchial abnormality seen this most likely represents secretions however will need to have follow-up CT scan to assure resolution given her history of lung cancer  Continue high-dose IV Bumex.    HD graft placed by vascular surgery yesterday.  Appreciate assistance.  Will access tomorrow and try to dialyze  Timing of dialysis per nephrology.  Appreciate assistance  Continue Symbicort, DuoNebs and bronchopulmonary hygiene  Wean O2 to keep SPO2 greater than 90%       DVT prophylaxis:  Medical DVT prophylaxis orders are present.    CODE STATUS:   Medical Intervention Limits: NO intubation (DNI)  Level Of Support Discussed With: Patient  Code Status (Patient has no pulse and is not breathing): No CPR (Do Not Attempt to Resuscitate)  Medical Interventions (Patient has pulse or is breathing): Limited  Support      Labs, imaging, microbiology, notes and medications personally reviewed  Discussed with primary    Electronically signed by Regis Holcomb DO, 12/02/23, 3:31 PM EST.

## 2023-12-02 NOTE — PROGRESS NOTES
[facility]     Progress Note    Patient Name: Charlette Rai  : 1935  MRN: [id number]  Primary Care Physician:  Rafael Lyons MD  Date of admission: 2023    Subjective   Subjective     Chief Complaint: Patient admitted with shortness of air has been getting nebulizer treatment also will be getting dialysis we will continue current management    HPI: To get dialysis we will continue the current management    Review of Systems   All systems were  reviewed and negative except for: Has been reviewed    Objective   Objective     Vitals:   Temp:  [97.3 °F (36.3 °C)-98.1 °F (36.7 °C)] 97.7 °F (36.5 °C)  Heart Rate:  [81-95] 89  Resp:  [19-22] 20  BP: (123-145)/(41-58) 139/55  Flow (L/min):  [2-3] 2    Physical Exam    Constitutional: Awake, alert   Eyes: PERRLA, sclerae anicteric, no conjunctival injection   HENT: NCAT, mucous membranes moist   Neck: Supple, no thyromegaly, no lymphadenopathy, trachea midline   Respiratory: Clear to auscultation bilaterally, nonlabored respirations    Cardiovascular: RRR, no murmurs, rubs, or gallops, palpable pedal pulses bilaterally   Gastrointestinal: Positive bowel sounds, soft, nontender, nondistended   Musculoskeletal: No bilateral ankle edema, no clubbing or cyanosis to extremities   Psychiatric: Appropriate affect, cooperative   Neurologic: Oriented x 3, strength symmetric in all extremities, Cranial Nerves grossly intact to confrontation, speech clear   Skin: No rashes   No change  Result Review    Result Review:  I have personally reviewed the results from the time of this admission to 2023 09:57 EST and agree with these findings:  [x]  Laboratory  []  Microbiology  [x]  Radiology  []  EKG/Telemetry   []  Cardiology/Vascular   []  Pathology  []  Old records  []  Other:  Most notable findings include: stable and doing well    Assessment & Plan   Assessment / Plan     Brief Patient Summary:  Charlette Rai is a 88 y.o. female who with renal failure and  shortness of    Active Hospital Problems:  Active Hospital Problems    Diagnosis     Aortic stenosis, moderate     COPD exacerbation     Hypoxia     Acute hypoxic respiratory failure     Acute exacerbation of CHF (congestive heart failure)     Essential hypertension     GERD (gastroesophageal reflux disease)     Stage 4 chronic kidney disease     Type 2 diabetes mellitus without complication        Plan:   Continue the same management    DVT prophylaxis:  Medical DVT prophylaxis orders are present.    CODE STATUS:   Medical Intervention Limits: NO intubation (DNI)  Level Of Support Discussed With: Patient  Code Status (Patient has no pulse and is not breathing): No CPR (Do Not Attempt to Resuscitate)  Medical Interventions (Patient has pulse or is breathing): Limited Support    Disposition:  I expect patient to be discharged after the patient has been stabilized.    Electronically signed by Benji Barragan MD, 12/02/23, 9:57 AM EST.      Part of this note may be an electronic transcription/translation of spoken language to printed text using the Dragon Dictation System.

## 2023-12-03 PROCEDURE — 94799 UNLISTED PULMONARY SVC/PX: CPT

## 2023-12-03 PROCEDURE — 99232 SBSQ HOSP IP/OBS MODERATE 35: CPT | Performed by: INTERNAL MEDICINE

## 2023-12-03 PROCEDURE — 94664 DEMO&/EVAL PT USE INHALER: CPT

## 2023-12-03 PROCEDURE — 25010000002 HEPARIN (PORCINE) PER 1000 UNITS: Performed by: SURGERY

## 2023-12-03 RX ADMIN — IPRATROPIUM BROMIDE AND ALBUTEROL SULFATE 3 ML: .5; 3 SOLUTION RESPIRATORY (INHALATION) at 15:27

## 2023-12-03 RX ADMIN — CARVEDILOL 25 MG: 12.5 TABLET, FILM COATED ORAL at 08:37

## 2023-12-03 RX ADMIN — CILOSTAZOL 100 MG: 100 TABLET ORAL at 08:37

## 2023-12-03 RX ADMIN — SEVELAMER CARBONATE 800 MG: 800 TABLET, FILM COATED ORAL at 12:29

## 2023-12-03 RX ADMIN — IPRATROPIUM BROMIDE AND ALBUTEROL SULFATE 3 ML: .5; 3 SOLUTION RESPIRATORY (INHALATION) at 06:31

## 2023-12-03 RX ADMIN — CILOSTAZOL 100 MG: 100 TABLET ORAL at 22:12

## 2023-12-03 RX ADMIN — PANTOPRAZOLE SODIUM 40 MG: 40 TABLET, DELAYED RELEASE ORAL at 17:24

## 2023-12-03 RX ADMIN — FLUTICASONE PROPIONATE 2 SPRAY: 50 SPRAY, METERED NASAL at 22:13

## 2023-12-03 RX ADMIN — BUDESONIDE AND FORMOTEROL FUMARATE DIHYDRATE 2 PUFF: 160; 4.5 AEROSOL RESPIRATORY (INHALATION) at 19:40

## 2023-12-03 RX ADMIN — HEPARIN SODIUM 5000 UNITS: 5000 INJECTION INTRAVENOUS; SUBCUTANEOUS at 08:37

## 2023-12-03 RX ADMIN — CARVEDILOL 25 MG: 12.5 TABLET, FILM COATED ORAL at 17:24

## 2023-12-03 RX ADMIN — SEVELAMER CARBONATE 800 MG: 800 TABLET, FILM COATED ORAL at 17:24

## 2023-12-03 RX ADMIN — HEPARIN SODIUM 5000 UNITS: 5000 INJECTION INTRAVENOUS; SUBCUTANEOUS at 22:12

## 2023-12-03 RX ADMIN — IPRATROPIUM BROMIDE AND ALBUTEROL SULFATE 3 ML: .5; 3 SOLUTION RESPIRATORY (INHALATION) at 19:40

## 2023-12-03 RX ADMIN — SEVELAMER CARBONATE 800 MG: 800 TABLET, FILM COATED ORAL at 08:37

## 2023-12-03 RX ADMIN — CETIRIZINE HYDROCHLORIDE 10 MG: 10 TABLET, FILM COATED ORAL at 08:37

## 2023-12-03 RX ADMIN — ROSUVASTATIN 20 MG: 20 TABLET, FILM COATED ORAL at 22:12

## 2023-12-03 RX ADMIN — Medication 10 ML: at 08:37

## 2023-12-03 RX ADMIN — HYDRALAZINE HYDROCHLORIDE 25 MG: 25 TABLET, FILM COATED ORAL at 22:12

## 2023-12-03 RX ADMIN — BUDESONIDE AND FORMOTEROL FUMARATE DIHYDRATE 2 PUFF: 160; 4.5 AEROSOL RESPIRATORY (INHALATION) at 06:31

## 2023-12-03 RX ADMIN — PANTOPRAZOLE SODIUM 40 MG: 40 TABLET, DELAYED RELEASE ORAL at 06:29

## 2023-12-03 RX ADMIN — DOCUSATE SODIUM 50MG AND SENNOSIDES 8.6MG 1 TABLET: 8.6; 5 TABLET, FILM COATED ORAL at 22:12

## 2023-12-03 RX ADMIN — HYDRALAZINE HYDROCHLORIDE 25 MG: 25 TABLET, FILM COATED ORAL at 08:37

## 2023-12-03 RX ADMIN — LINAGLIPTIN 5 MG: 5 TABLET, FILM COATED ORAL at 08:37

## 2023-12-03 RX ADMIN — Medication 10 ML: at 22:12

## 2023-12-03 RX ADMIN — BUMETANIDE 3 MG: 1 TABLET ORAL at 08:37

## 2023-12-03 NOTE — PROGRESS NOTES
Pulmonary / Critical Care Progress Note      Patient Name: Charlette Rai  : 1935  MRN: [id number]  Attending:  Rafael Lyons MD  Date of admission: 2023    Subjective   Subjective   Follow-up for hypoxia    Over past 24 hours:  Volume status improving as his respiratory status  On 2 L of oxygen  Feeling better feeling better today  Felt tired after dialysis      Objective   Objective     Vitals:   Temp:  [97.5 °F (36.4 °C)-98.2 °F (36.8 °C)] 97.5 °F (36.4 °C)  Heart Rate:  [55-94] 88  Resp:  [18] 18  BP: (126-145)/(42-77) 145/44  Flow (L/min):  [1-2] 1    Physical Exam   12/3/2023    General:  WDWN, Alert, NAD.    HEENT:  PERRL, EOMI.  OP, nares clear  Chest:  good aeration, decreasing crackles and rhonchi bilaterally, tympanic to percussion bilaterally, no work of breathing noted  CV: RRR, no MGR, pulses 2+, equal.  Abd:  Soft, NT, ND, + BS, no HSM  EXT:  no clubbing, no cyanosis, no edema  Neuro:  A&Ox3, CN grossly intact, no focal deficits.  Skin: No rashes or lesions noted      Result Review    Result Review:  I have personally reviewed the results from the time of this admission to 12/3/2023 14:40 EST and agree with these findings:  [x]  Laboratory  [x]  Microbiology  [x]  Radiology  [x]  EKG/Telemetry   [x]  Cardiology/Vascular   []  Pathology  []  Old records  []  Other:  Most notable findings include:       Lab 23  0530 23  0453 23  0504 23  0415 23  1700 23  0428   WBC 7.35  --  6.35 6.44  --  5.63   HEMOGLOBIN 7.9*  --  8.8* 7.8*  --  7.5*   HEMATOCRIT 25.7*  --  28.7* 27.0*  --  25.8*   PLATELETS 197  --  238 206  --  214   SODIUM 138 136 138 135*  --  136   SODIUM, ARTERIAL  --   --   --   --  137.6  --    POTASSIUM 4.3 4.8 4.2 5.5*  --  5.9*   CHLORIDE 100 100 99 102  --  103   CO2 27.4 24.6 25.0 18.4*  --  18.9*   BUN 99* 93* 106* 103*  --  99*   CREATININE 2.88* 2.63* 2.95* 3.25*  --  3.21*   GLUCOSE 91 131* 92 182*  --  210*   GLUCOSE, ARTERIAL  --    --   --   --  175*  --    CALCIUM 8.5* 8.2* 8.6 7.7*  --  7.8*   PHOSPHORUS 3.7  --  4.7* 5.9*  --   --    TOTAL PROTEIN  --   --   --   --   --  6.1   ALBUMIN 3.2*  --  3.3* 3.2*  --  3.2*   GLOBULIN  --   --   --   --   --  2.9     CT Chest Without Contrast Diagnostic    Result Date: 11/28/2023  PROCEDURE: CT CHEST WO CONTRAST DIAGNOSTIC  COMPARISON: [facility], CR, XR CHEST 1 VW, 11/25/2023, 13:23.  [facility], CR, XR CHEST 1 VW, 11/09/2023, 4:37.  [facility], CR, XR CHEST 1 VW, 11/27/2023, 10:23.  [facility], CT, CT CHEST WO CONTRAST DIAGNOSTIC, 3/01/2023, 15:47.  INDICATIONS: hypoxia, worsening infiltrates  PROTOCOL:   Standard imaging protocol performed    RADIATION:   DLP: 117 mGy*cm   Automated exposure control was utilized to minimize radiation dose.  TECHNIQUE: Axial images of the chest without intravenous contrast.  FINDINGS: There is a small right pleural effusion.  There is mild cardiac enlargement.  The thoracic aorta has a normal caliber.  There is dense atherosclerotic calcification.  There is coronary artery calcification.  Stable irregular consolidation inferiorly in the right upper lobe consistent with treatment related change.  There are postoperative changes superiorly in the left hemithorax.  There are areas of airspace consolidation in the lower lobes.  Calcified pleural plaques are present in the left hemithorax.  Endobronchial secretion is present in the right lower lobe.  There are areas of mild intralobular septal thickening.  Degenerative changes are present in the thoracic spine.  There are probable simple and proteinaceous cyst in the visualized kidneys.  IMPRESSION:  1. Areas of airspace consolidation in the lower lobes suspicious for pneumonia.  Areas of endobronchial secretion are present in the right lower lobe.  2. Small right pleural effusion.  3. Areas of intraobular septal thickening may represent mild pulmonary  edema.   YARELIS LUCAS MD       Electronically Signed and Approved By: YARELIS LUCAS MD on 11/28/2023 at 15:32                Assessment & Plan   Assessment / Plan     Active Hospital Problems:  Active Hospital Problems    Diagnosis     Aortic stenosis, moderate     COPD exacerbation     Hypoxia     Acute hypoxic respiratory failure     Acute exacerbation of CHF (congestive heart failure)     Essential hypertension     GERD (gastroesophageal reflux disease)     Stage 4 chronic kidney disease     Type 2 diabetes mellitus without complication        Impression:  Acute hypoxemic respiratory failure  Acute decompensated diastolic congestive heart failure  Volume overload  Acute cardiogenic pulmonary edema  End-stage renal disease.  Approaching hemodialysis  COPD without exacerbation  Mucous plugging  History of lung cancer status post lobectomy on the left in 2005  Essential hypertension  Hyperkalemia     Plan:  Overall improving with improvement in volume status  Will follow-up with us as an outpatient and repeat noncontrast chest CT in 3 months due to the endobronchial abnormality seen this most likely represents secretions however will need to have follow-up CT scan to assure resolution given her history of lung cancer  Continue with diuretics  HD graft placed by vascular surgery yesterday.  Appreciate assistance.  Will access tomorrow and try to dialyze  Timing of dialysis per nephrology.  Appreciate assistance  Continue with LABA LAMA inhaled corticosteroid  Wean O2 to keep SPO2 greater than 90%       DVT prophylaxis:  Medical DVT prophylaxis orders are present.    CODE STATUS:   Medical Intervention Limits: NO intubation (DNI)  Level Of Support Discussed With: Patient  Code Status (Patient has no pulse and is not breathing): No CPR (Do Not Attempt to Resuscitate)  Medical Interventions (Patient has pulse or is breathing): Limited Support      Labs, imaging, microbiology, notes and medications personally  reviewed  Discussed with primary    Electronically signed by Regis Holcomb DO, 12/03/23, 2:40 PM EST.

## 2023-12-03 NOTE — PROGRESS NOTES
Respiratory Therapist Broncho-Pulmonary Hygiene Progress Note      Patient Name:  Charlette Rai  YOB: 1935    Charlette Rai meets the qualification for Level 1 of the Bronco-Pulmonary Hygiene Protocol. This was based on my daily patient assessment and includes review of chest x-ray results, cough ability and quality, oxygenation, secretions or risk for secretion development and patient mobility.     Broncho-Pulmonary Hygiene Assessment:    Level of Movement: Actively changing positions without assistance  Alert/ oriented/ cooperative    Breath Sounds: Clear to slightly diminished    Cough: Strong, effective    Chest X-Ray: No CXR available    Sputum Productions: None or small amount of thin or watery secretions with effective cough    History and Physical: New onset of bronchitis or existing chronic pulmonary conditions.  **(not in an exacerbation)    SpO2 to Oxygen Need: greater than 92% on room air or  less than 3L nasal canula    Current SpO2 is: 98 on 2L    Based on this information, I have completed the following interventions: Teach/Instruct patient on cough and deep breathe      Electronically signed by Janice Leal RRT, 12/03/23, 8:20 AM EST.

## 2023-12-03 NOTE — PROGRESS NOTES
" LOS: 8 days   Patient Care Team:  Rafael Lyons MD as PCP - General (Internal Medicine)  Regis Mckeon MD as Consulting Physician (Radiation Oncology)  Carlton Casillas MD as Consulting Physician (Gastroenterology)  Susan Marcos APRN as Nurse Practitioner (Gastroenterology)    Chief Complaint: ESRD    Subjective     History of Present Illness  Pt is feeling a bit better.  No new complaint.     Subjective:  Symptoms:  Improved.  No shortness of breath, cough, chest pain, chest pressure or anxiety.    Diet:  Adequate intake.  No nausea or vomiting.    Activity level: Impaired due to weakness.    Pain:  She reports no pain.        History taken from: patient chart    Objective     Vital Sign Min/Max for last 24 hours  Temp  Min: 97.5 °F (36.4 °C)  Max: 98.2 °F (36.8 °C)   BP  Min: 126/77  Max: 145/44   Pulse  Min: 55  Max: 94   Resp  Min: 18  Max: 18   SpO2  Min: 95 %  Max: 99 %   Flow (L/min)  Min: 1  Max: 2   Weight  Min: 73.2 kg (161 lb 6 oz)  Max: 73.2 kg (161 lb 6 oz)     Flowsheet Rows      Flowsheet Row First Filed Value   Admission Height 165.1 cm (65\") Documented at 11/25/2023 1304   Admission Weight 75.3 kg (166 lb 0.1 oz) Documented at 11/25/2023 1304            I/O this shift:  In: 960 [P.O.:960]  Out: -   I/O last 3 completed shifts:  In: 720 [P.O.:720]  Out: 500     Objective:  General Appearance:  Comfortable, in no acute distress and not in pain.    Vital signs: (most recent): Blood pressure 137/41, pulse 87, temperature 97.5 °F (36.4 °C), temperature source Oral, resp. rate 18, height 165.1 cm (65\"), weight 73.2 kg (161 lb 6 oz), SpO2 97%, not currently breastfeeding.  Vital signs are normal.  No fever.    Output: Producing urine.    HEENT: Normal HEENT exam.    Lungs:  Normal respiratory rate.  She is not in respiratory distress.  There are decreased breath sounds.  No rales.    Heart: Normal rate.    Abdomen: Abdomen is soft.  Bowel sounds are normal.   There is no abdominal " "tenderness.     Extremities: Normal range of motion.  There is dependent edema.  (Left upper extremity AV graft in place, weak thrill, good bruit.  Mild bruising.)  Pulses: Distal pulses are intact.    Neurological: Patient is alert and oriented to person, place and time.    Pupils:  Pupils are equal, round, and reactive to light.    Skin:  Warm and dry.                Results Review:     I reviewed the patient's new clinical results.    WBC WBC   Date Value Ref Range Status   12/02/2023 7.35 3.40 - 10.80 10*3/mm3 Final      HGB Hemoglobin   Date Value Ref Range Status   12/02/2023 7.9 (L) 12.0 - 15.9 g/dL Final      HCT Hematocrit   Date Value Ref Range Status   12/02/2023 25.7 (L) 34.0 - 46.6 % Final      Platlets No results found for: \"LABPLAT\"   MCV MCV   Date Value Ref Range Status   12/02/2023 96.6 79.0 - 97.0 fL Final          Sodium Sodium   Date Value Ref Range Status   12/02/2023 138 136 - 145 mmol/L Final   12/01/2023 136 136 - 145 mmol/L Final      Potassium Potassium   Date Value Ref Range Status   12/02/2023 4.3 3.5 - 5.2 mmol/L Final   12/01/2023 4.8 3.5 - 5.2 mmol/L Final      Chloride Chloride   Date Value Ref Range Status   12/02/2023 100 98 - 107 mmol/L Final   12/01/2023 100 98 - 107 mmol/L Final      CO2 CO2   Date Value Ref Range Status   12/02/2023 27.4 22.0 - 29.0 mmol/L Final   12/01/2023 24.6 22.0 - 29.0 mmol/L Final      BUN BUN   Date Value Ref Range Status   12/02/2023 99 (H) 8 - 23 mg/dL Final   12/01/2023 93 (H) 8 - 23 mg/dL Final      Creatinine Creatinine   Date Value Ref Range Status   12/02/2023 2.88 (H) 0.57 - 1.00 mg/dL Final   12/01/2023 2.63 (H) 0.57 - 1.00 mg/dL Final      Calcium Calcium   Date Value Ref Range Status   12/02/2023 8.5 (L) 8.6 - 10.5 mg/dL Final   12/01/2023 8.2 (L) 8.6 - 10.5 mg/dL Final      PO4 No results found for: \"CAPO4\"   Albumin Albumin   Date Value Ref Range Status   12/02/2023 3.2 (L) 3.5 - 5.2 g/dL Final      Magnesium No results found for: \"MG\"   " "  Uric Acid No results found for: \"URICACID\"     Medication Review:   budesonide-formoterol, 2 puff, Inhalation, BID - RT  bumetanide, 3 mg, Oral, Daily  carvedilol, 25 mg, Oral, BID With Meals  cetirizine, 10 mg, Oral, Daily  cilostazol, 100 mg, Oral, BID  fluticasone, 2 spray, Each Nare, Nightly  heparin (porcine), 5,000 Units, Subcutaneous, Q12H  hydrALAZINE, 25 mg, Oral, BID  ipratropium-albuterol, 3 mL, Nebulization, TID - RT  linagliptin, 5 mg, Oral, Daily  pantoprazole, 40 mg, Oral, BID AC  rosuvastatin, 20 mg, Oral, Nightly  sennosides-docusate, 1 tablet, Oral, Nightly  sevelamer, 800 mg, Oral, TID With Meals  sodium chloride, 10 mL, Intravenous, Q12H          Assessment & Plan       Acute exacerbation of CHF (congestive heart failure)    Essential hypertension    GERD (gastroesophageal reflux disease)    Stage 4 chronic kidney disease    Type 2 diabetes mellitus without complication    Acute hypoxic respiratory failure    COPD exacerbation    Hypoxia    Aortic stenosis, moderate      Assessment & Plan  - ESRD, progressive renal disease from diabetic nephropathy on a background of hypertension and congestive heart failure and bilateral renal artery stenosis.  Volume overload is slowly improving.  Status post left upper extremity AV graft, AcuSeal.  First dialysis today. Outpatient dialysis set at [facility] in [place].  Orders called to the clinic.  Discussed with patient what to expect starting dialysis.  Continue p.o. Bumex.  Will plan dialysis tomorrow after which patient can be discharged.  - Hypertension, blood pressure is better with improved volume control.  - Bilateral renal artery stenosis, aldosterone/renin ratio was not elevated.  No intervention.  - Congestive heart failure, diastolic dysfunction, known preserved LV function.  - History of lung cancer 2005.  Followed by pulmonary.  - Diabetes with complications.  - Anemia, severe.  tsat ok.  Started CASSANDRA, goal hemoglobin above 10.  Change Epogen to IV " form with dialysis.  - Hyperkalemia, secondary to renal failure, combination of diuretics should be helpful, on low potassium diet.    - Metabolic acidosis, mild,   On oral sodium bicarb.  Improved now.  Discussed with primary.  Please call with any questions.      Edi García MD  12/03/23  15:46 EST

## 2023-12-03 NOTE — PROGRESS NOTES
[facility]     Progress Note    Patient Name: Charlette Rai  : 1935  MRN: [id number]  Primary Care Physician:  Rafael Lyons MD  Date of admission: 2023    Subjective   Subjective     Chief Complaint: Patient continues to complaining of extreme weakness she wants to wait for 1 more day and try and see if he can tolerate the dialysis so we will keep her for 1 more day I have discussed with nephrology    HPI: She will be getting dialysis tomorrow otherwise stable    Review of Systems   All systems were reviewed and negative except for: Been reviewed and discussed    Objective   Objective     Vitals:   Temp:  [97.7 °F (36.5 °C)-98.2 °F (36.8 °C)] 98.2 °F (36.8 °C)  Heart Rate:  [55-94] 88  Resp:  [18-20] 18  BP: (123-142)/(42-77) 139/44  Flow (L/min):  [1-2] 1    Physical Exam    Constitutional: Awake, alert   Eyes: PERRLA, sclerae anicteric, no conjunctival injection   HENT: NCAT, mucous membranes moist   Neck: Supple, no thyromegaly, no lymphadenopathy, trachea midline   Respiratory: Clear to auscultation bilaterally, nonlabored respirations    Cardiovascular: RRR, no murmurs, rubs, or gallops, palpable pedal pulses bilaterally   Gastrointestinal: Positive bowel sounds, soft, nontender, nondistended   Musculoskeletal: No bilateral ankle edema, no clubbing or cyanosis to extremities   Psychiatric: Appropriate affect, cooperative   Neurologic: Oriented x 3, strength symmetric in all extremities, Cranial Nerves grossly intact to confrontation, speech clear   Skin: No rashes   No change  Result Review    Result Review:  I have personally reviewed the results from the time of this admission to 12/3/2023 10:28 EST and agree with these findings:  [x]  Laboratory  []  Microbiology  []  Radiology  []  EKG/Telemetry   []  Cardiology/Vascular   []  Pathology  []  Old records  []  Other:  Most notable findings include: Patient will be here for another day and observe her dialysis    Assessment & Plan    Assessment / Plan     Brief Patient Summary:  Charlette Rai is a 88 y.o. female who to be dialyzed tomorrow we will continue current management    Active Hospital Problems:  Active Hospital Problems    Diagnosis     Aortic stenosis, moderate     COPD exacerbation     Hypoxia     Acute hypoxic respiratory failure     Acute exacerbation of CHF (congestive heart failure)     Essential hypertension     GERD (gastroesophageal reflux disease)     Stage 4 chronic kidney disease     Type 2 diabetes mellitus without complication        Plan:   New current management    DVT prophylaxis:  Medical DVT prophylaxis orders are present.    CODE STATUS:   Medical Intervention Limits: NO intubation (DNI)  Level Of Support Discussed With: Patient  Code Status (Patient has no pulse and is not breathing): No CPR (Do Not Attempt to Resuscitate)  Medical Interventions (Patient has pulse or is breathing): Limited Support    Disposition:  I expect patient to be discharged hopefully tomorrow after dialysis.    Electronically signed by Benji Barragan MD, 12/03/23, 10:28 AM EST.      Part of this note may be an electronic transcription/translation of spoken language to printed text using the Dragon Dictation System.

## 2023-12-04 PROCEDURE — 99231 SBSQ HOSP IP/OBS SF/LOW 25: CPT | Performed by: INTERNAL MEDICINE

## 2023-12-04 PROCEDURE — 94799 UNLISTED PULMONARY SVC/PX: CPT

## 2023-12-04 PROCEDURE — 5A1D70Z PERFORMANCE OF URINARY FILTRATION, INTERMITTENT, LESS THAN 6 HOURS PER DAY: ICD-10-PCS | Performed by: INTERNAL MEDICINE

## 2023-12-04 PROCEDURE — 25010000002 HEPARIN (PORCINE) PER 1000 UNITS: Performed by: SURGERY

## 2023-12-04 PROCEDURE — 94664 DEMO&/EVAL PT USE INHALER: CPT

## 2023-12-04 PROCEDURE — 25010000002 EPOETIN ALFA PER 1000 UNITS: Performed by: INTERNAL MEDICINE

## 2023-12-04 RX ADMIN — Medication 10 ML: at 14:20

## 2023-12-04 RX ADMIN — SEVELAMER CARBONATE 800 MG: 800 TABLET, FILM COATED ORAL at 17:15

## 2023-12-04 RX ADMIN — CETIRIZINE HYDROCHLORIDE 10 MG: 10 TABLET, FILM COATED ORAL at 14:19

## 2023-12-04 RX ADMIN — BUMETANIDE 3 MG: 1 TABLET ORAL at 14:19

## 2023-12-04 RX ADMIN — PANTOPRAZOLE SODIUM 40 MG: 40 TABLET, DELAYED RELEASE ORAL at 06:41

## 2023-12-04 RX ADMIN — IPRATROPIUM BROMIDE AND ALBUTEROL SULFATE 3 ML: .5; 3 SOLUTION RESPIRATORY (INHALATION) at 07:59

## 2023-12-04 RX ADMIN — BUDESONIDE AND FORMOTEROL FUMARATE DIHYDRATE 2 PUFF: 160; 4.5 AEROSOL RESPIRATORY (INHALATION) at 07:59

## 2023-12-04 RX ADMIN — IPRATROPIUM BROMIDE AND ALBUTEROL SULFATE 3 ML: .5; 3 SOLUTION RESPIRATORY (INHALATION) at 15:09

## 2023-12-04 RX ADMIN — Medication 10 ML: at 21:11

## 2023-12-04 RX ADMIN — FLUTICASONE PROPIONATE 2 SPRAY: 50 SPRAY, METERED NASAL at 21:10

## 2023-12-04 RX ADMIN — ERYTHROPOIETIN 10000 UNITS: 10000 INJECTION, SOLUTION INTRAVENOUS; SUBCUTANEOUS at 14:24

## 2023-12-04 RX ADMIN — LINAGLIPTIN 5 MG: 5 TABLET, FILM COATED ORAL at 14:19

## 2023-12-04 RX ADMIN — PANTOPRAZOLE SODIUM 40 MG: 40 TABLET, DELAYED RELEASE ORAL at 17:15

## 2023-12-04 RX ADMIN — HEPARIN SODIUM 5000 UNITS: 5000 INJECTION INTRAVENOUS; SUBCUTANEOUS at 21:09

## 2023-12-04 RX ADMIN — CILOSTAZOL 100 MG: 100 TABLET ORAL at 21:09

## 2023-12-04 RX ADMIN — BUDESONIDE AND FORMOTEROL FUMARATE DIHYDRATE 2 PUFF: 160; 4.5 AEROSOL RESPIRATORY (INHALATION) at 18:22

## 2023-12-04 RX ADMIN — IPRATROPIUM BROMIDE AND ALBUTEROL SULFATE 3 ML: .5; 3 SOLUTION RESPIRATORY (INHALATION) at 18:22

## 2023-12-04 RX ADMIN — ROSUVASTATIN 20 MG: 20 TABLET, FILM COATED ORAL at 21:09

## 2023-12-04 RX ADMIN — TRAMADOL HYDROCHLORIDE 50 MG: 50 TABLET ORAL at 21:09

## 2023-12-04 RX ADMIN — CILOSTAZOL 100 MG: 100 TABLET ORAL at 14:19

## 2023-12-04 NOTE — PROGRESS NOTES
" LOS: 9 days   Patient Care Team:  Rafael Lyons MD as PCP - General (Internal Medicine)  Regis Mckeon MD as Consulting Physician (Radiation Oncology)  Carlton Casillas MD as Consulting Physician (Gastroenterology)  Susan Marcos APRN as Nurse Practitioner (Gastroenterology)    Chief Complaint: ESRD    Subjective     History of Present Illness  Pt is feeling a bit better.  No new complaint.     Subjective:  Symptoms:  Improved.  No shortness of breath, cough, chest pain, chest pressure or anxiety.    Diet:  Adequate intake.  No nausea or vomiting.    Activity level: Impaired due to weakness.    Pain:  She reports no pain.        History taken from: patient chart    Objective     Vital Sign Min/Max for last 24 hours  Temp  Min: 97.7 °F (36.5 °C)  Max: 98.4 °F (36.9 °C)   BP  Min: 123/42  Max: 158/47   Pulse  Min: 84  Max: 99   Resp  Min: 16  Max: 18   SpO2  Min: 93 %  Max: 99 %   Flow (L/min)  Min: 1  Max: 2   Weight  Min: 68.1 kg (150 lb 2.1 oz)  Max: 68.1 kg (150 lb 2.1 oz)     Flowsheet Rows      Flowsheet Row First Filed Value   Admission Height 165.1 cm (65\") Documented at 11/25/2023 1304   Admission Weight 75.3 kg (166 lb 0.1 oz) Documented at 11/25/2023 1304            I/O this shift:  In: 240 [P.O.:240]  Out: -   I/O last 3 completed shifts:  In: 1200 [P.O.:1200]  Out: 1000 [Urine:1000]    Objective:  General Appearance:  Comfortable, in no acute distress and not in pain.    Vital signs: (most recent): Blood pressure 151/51, pulse 99, temperature 98.1 °F (36.7 °C), temperature source Oral, resp. rate 16, height 165.1 cm (65\"), weight 68.1 kg (150 lb 2.1 oz), SpO2 98%, not currently breastfeeding.  Vital signs are normal.  No fever.    Output: Producing urine.    HEENT: Normal HEENT exam.    Lungs:  Normal respiratory rate.  She is not in respiratory distress.  There are decreased breath sounds.  No rales.    Heart: Normal rate.    Abdomen: Abdomen is soft and non-distended.  Bowel sounds are " "normal.   There is no abdominal tenderness.     Extremities: Normal range of motion.  There is dependent edema.  (Left upper extremity AV graft in place, weak thrill, good bruit.  Mild bruising.)  Pulses: Distal pulses are intact.    Neurological: Patient is alert and oriented to person, place and time.    Pupils:  Pupils are equal, round, and reactive to light.    Skin:  Warm and dry.                Results Review:     I reviewed the patient's new clinical results.    WBC WBC   Date Value Ref Range Status   12/02/2023 7.35 3.40 - 10.80 10*3/mm3 Final      HGB Hemoglobin   Date Value Ref Range Status   12/02/2023 7.9 (L) 12.0 - 15.9 g/dL Final      HCT Hematocrit   Date Value Ref Range Status   12/02/2023 25.7 (L) 34.0 - 46.6 % Final      Platlets No results found for: \"LABPLAT\"   MCV MCV   Date Value Ref Range Status   12/02/2023 96.6 79.0 - 97.0 fL Final          Sodium Sodium   Date Value Ref Range Status   12/02/2023 138 136 - 145 mmol/L Final      Potassium Potassium   Date Value Ref Range Status   12/02/2023 4.3 3.5 - 5.2 mmol/L Final      Chloride Chloride   Date Value Ref Range Status   12/02/2023 100 98 - 107 mmol/L Final      CO2 CO2   Date Value Ref Range Status   12/02/2023 27.4 22.0 - 29.0 mmol/L Final      BUN BUN   Date Value Ref Range Status   12/02/2023 99 (H) 8 - 23 mg/dL Final      Creatinine Creatinine   Date Value Ref Range Status   12/02/2023 2.88 (H) 0.57 - 1.00 mg/dL Final      Calcium Calcium   Date Value Ref Range Status   12/02/2023 8.5 (L) 8.6 - 10.5 mg/dL Final      PO4 No results found for: \"CAPO4\"   Albumin Albumin   Date Value Ref Range Status   12/02/2023 3.2 (L) 3.5 - 5.2 g/dL Final      Magnesium No results found for: \"MG\"     Uric Acid No results found for: \"URICACID\"     Medication Review:   budesonide-formoterol, 2 puff, Inhalation, BID - RT  bumetanide, 3 mg, Oral, Daily  carvedilol, 25 mg, Oral, BID With Meals  cetirizine, 10 mg, Oral, Daily  cilostazol, 100 mg, Oral, " BID  epoetin mita/mita-epbx, 10,000 Units, Intravenous, Once per day on Mon Wed Fri  fluticasone, 2 spray, Each Nare, Nightly  heparin (porcine), 5,000 Units, Subcutaneous, Q12H  hydrALAZINE, 25 mg, Oral, BID  ipratropium-albuterol, 3 mL, Nebulization, TID - RT  linagliptin, 5 mg, Oral, Daily  pantoprazole, 40 mg, Oral, BID AC  rosuvastatin, 20 mg, Oral, Nightly  sennosides-docusate, 1 tablet, Oral, Nightly  sevelamer, 800 mg, Oral, TID With Meals  sodium chloride, 10 mL, Intravenous, Q12H          Assessment & Plan       Acute exacerbation of CHF (congestive heart failure)    Essential hypertension    GERD (gastroesophageal reflux disease)    Stage 4 chronic kidney disease    Type 2 diabetes mellitus without complication    Acute hypoxic respiratory failure    COPD exacerbation    Hypoxia    Aortic stenosis, moderate      Assessment & Plan  - ESRD, progressive renal disease from diabetic nephropathy on a background of hypertension and congestive heart failure and bilateral renal artery stenosis.  Volume overload is slowly improving.  Status post left upper extremity AV graft, AcuSeal.  First dialysis 12/2/2000 23-second treatment today well-tolerated.  Outpatient dialysis set at [facility] in [place].  Orders called to the clinic.  Discussed with patient what to expect starting dialysis.  Continue p.o. Bumex.    - Hypertension, blood pressure is better with improved volume control.  - Bilateral renal artery stenosis, aldosterone/renin ratio was not elevated.  No intervention.  - Congestive heart failure, diastolic dysfunction, known preserved LV function.  - History of lung cancer 2005.  Followed by pulmonary.  - Diabetes with complications.  - Anemia, severe.  tsat ok.  Started CASSANDRA, goal hemoglobin above 10.  Change Epogen to IV form with dialysis.  - Hyperkalemia, secondary to renal failure, combination of diuretics should be helpful, on low potassium diet.    - Metabolic acidosis, mild,   On oral sodium bicarb.   Improved now.  Discussed with primary.  Okay to DC from renal standpoint.  Please call with any questions.      Edi García MD  12/04/23  15:20 EST

## 2023-12-04 NOTE — DISCHARGE INSTR - APPOINTMENTS
Hemodialysis- [facility] [address] [place] [address] [phone]  Monday, Wednesday, Friday 3:10 PM  First Treatment: Wednesday, December 6th 3:10 PM and     Follow Up Appt with Dr aRfael Lyons on Dec 20 @4:15pm

## 2023-12-04 NOTE — PROGRESS NOTES
CARDIOLOGY  INPATIENT PROGRESS NOTE                [address]    12/4/2023      PATIENT IDENTIFICATION:   Name:  Charlette Rai      MRN:  [id number]     88 y.o.  female                 SUBJECTIVE:   No events overnight no complaint this morning patient currently on hemodialysis  OBJECTIVE:  Vitals:    12/04/23 0010 12/04/23 0439 12/04/23 0508 12/04/23 0732   BP: 158/47 123/42  136/52   BP Location: Right arm Right arm  Right arm   Patient Position: Lying Lying  Lying   Pulse:  97  91   Resp: 16 16  16   Temp: 97.8 °F (36.6 °C) 98.4 °F (36.9 °C)  98.1 °F (36.7 °C)   TempSrc: Oral Oral  Oral   SpO2: 97% 93%  94%   Weight:   68.1 kg (150 lb 2.1 oz)    Height:               Body mass index is 24.98 kg/m².    Intake/Output Summary (Last 24 hours) at 12/4/2023 1255  Last data filed at 12/4/2023 0937  Gross per 24 hour   Intake 480 ml   Output 1000 ml   Net -520 ml       Telemetry: Normal sinus rhythm    Physical Exam  General Appearance:   no acute distress  Alert and oriented x3  HENT:   lips not cyanotic  Atraumatic  Neck:  No jvd   supple  Respiratory:  no respiratory distress  normal breath sounds  no rales  Cardiovascular:    regular rhythm  no S3, no S4   no murmur  no rub  lower extremity edema: Mild  Skin:   warm, dry  No rashes      No Known Allergies  Scheduled meds:  budesonide-formoterol, 2 puff, Inhalation, BID - RT  bumetanide, 3 mg, Oral, Daily  carvedilol, 25 mg, Oral, BID With Meals  cetirizine, 10 mg, Oral, Daily  cilostazol, 100 mg, Oral, BID  epoetin mita/mita-epbx, 10,000 Units, Intravenous, Once per day on Mon Wed Fri  fluticasone, 2 spray, Each Nare, Nightly  heparin (porcine), 5,000 Units, Subcutaneous, Q12H  hydrALAZINE, 25 mg, Oral, BID  ipratropium-albuterol, 3 mL, Nebulization, TID - RT  linagliptin, 5 mg, Oral, Daily  pantoprazole, 40 mg, Oral, BID AC  rosuvastatin, 20 mg, Oral, Nightly  sennosides-docusate, 1 tablet, Oral, Nightly  sevelamer, 800 mg, Oral, TID With  "Meals  sodium chloride, 10 mL, Intravenous, Q12H      IV meds:                         Data Review:  CBC          11/28/2023    04:15 11/30/2023    05:04 12/2/2023    05:30   CBC   WBC 6.44  6.35  7.35    RBC 2.65  3.02  2.66    Hemoglobin 7.8  8.8  7.9    Hematocrit 27.0  28.7  25.7    .9  95.0  96.6    MCH 29.4  29.1  29.7    MCHC 28.9  30.7  30.7    RDW 13.2  12.9  13.0    Platelets 206  238  197      CMP          11/30/2023    05:04 12/1/2023    04:53 12/2/2023    05:30   CMP   Glucose 92  131  91      93  99    Creatinine 2.95  2.63  2.88    EGFR 15.0  17.2  15.4    Sodium 138  136  138    Potassium 4.2  4.8  4.3    Chloride 99  100  100    Calcium 8.6  8.2  8.5    Albumin 3.3   3.2    BUN/Creatinine Ratio 35.9  35.4  34.4    Anion Gap 14.0  11.4  10.6       CARDIAC LABS:      Lab 11/28/23  0415   PROBNP 26,260.0*        No results found for: \"DIGOXIN\"   Lab Results   Component Value Date    TSH 1.650 10/29/2023           Invalid input(s): \"LDLCALC\"  No results found for: \"POCTROP\"  Lab Results   Component Value Date    TROPONINT 81 (C) 11/25/2023   (  Lab Results   Component Value Date    MG 1.9 11/30/2023     Results for orders placed during the hospital encounter of 10/29/23    Adult Transthoracic Echo Complete W/ Cont if Necessary Per Protocol    Interpretation Summary    Left ventricular systolic function is normal. Calculated left ventricular EF = 56.8%    Left ventricular wall thickness is consistent with mild asymmetric hypertrophy.    Left ventricular diastolic function is consistent with (grade I) impaired relaxation.    Left atrial volume is moderately increased.    Moderate aortic valve stenosis is present.    Estimated right ventricular systolic pressure from tricuspid regurgitation is normal (<35 mmHg).    Mild dilation of the aortic root is present.           ASSESSMENT:    Acute exacerbation of CHF (congestive heart failure)    Essential hypertension    Aortic stenosis, moderate    " Stage 4 chronic kidney disease    Type 2 diabetes mellitus without complication    GERD (gastroesophageal reflux disease)    Acute hypoxic respiratory failure    COPD exacerbation    Hypoxia        PLAN:  1.  Continue patient on her current cardiac medications stable from discharge from a cardiac standpoint can follow-up as outpatient.  Will sign off          León Sanchez MD  12/4/2023    12:55 EST

## 2023-12-04 NOTE — CONSULTS
HD has been arranged at [facility] on a M,W,F schedule at 3:10 PM. First treatment scheduled for Wednesday, 12/06 3:10 PM.

## 2023-12-04 NOTE — NURSING NOTE
Treatment completed w/o complications. Ordered goal of 0.5 kg achieved: Net fluid removal 500ml. Last /56. Patient left the floor aox4 in stable conditions

## 2023-12-04 NOTE — PROGRESS NOTES
[facility]     Progress Note    Patient Name: Charlette Rai  : 1935  MRN: [id number]  Primary Care Physician:  Rafael Lyons MD  Date of admission: 2023      Subjective   Brief summary.  Admitted with hypoxia and shortness of breath      HPI:  Feeling better, dialysis started.  Waiting for dialysis today.    Review of Systems     Fatigue and shortness of breath, anxious.  No fever chills, no chest pain  Head congestion and stuffiness improved      Objective     Vitals:   Temp:  [97.7 °F (36.5 °C)-98.4 °F (36.9 °C)] 98.1 °F (36.7 °C)  Heart Rate:  [] 101  Resp:  [16-18] 18  BP: (123-158)/(38-52) 128/44  Flow (L/min):  [1-2] 2    Physical Exam :     Elderly female not in acute distress.     Heart regular.  Lungs diminished breath sounds with few rhonchi.  Abdomen soft nontender.    Extremities with trace of edema       Result Review:  I have personally reviewed the results from the time of this admission to 2023 17:41 EST and agree with these findings:  [x]  Laboratory  []  Microbiology  []  Radiology  []  EKG/Telemetry   []  Cardiology/Vascular   []  Pathology  []  Old records  []  Other:        Assessment / Plan       Active Hospital Problems:  Active Hospital Problems    Diagnosis     Aortic stenosis, moderate     COPD exacerbation     Hypoxia     Acute hypoxic respiratory failure     Acute exacerbation of CHF (congestive heart failure)     Essential hypertension     GERD (gastroesophageal reflux disease)     Stage 4 chronic kidney disease     Type 2 diabetes mellitus without complication        Plan:   For dialysis today.  Symptoms improving, discussed with patient and daughter wants to go home rather than rehab, will discharge home tomorrow, discussed with nephrologist for meds adjustment considering dialysis has been started.      DVT prophylaxis:  Medical DVT prophylaxis orders are present.    CODE STATUS:   Medical Intervention Limits: NO intubation (DNI)  Level Of Support  Discussed With: Patient  Code Status (Patient has no pulse and is not breathing): No CPR (Do Not Attempt to Resuscitate)  Medical Interventions (Patient has pulse or is breathing): Limited Support                      Electronically signed by Rafael Lyons MD, 12/04/23, 5:43 PM EST.

## 2023-12-05 ENCOUNTER — READMISSION MANAGEMENT (OUTPATIENT)
Dept: CALL CENTER | Facility: HOSPITAL | Age: 88
End: 2023-12-05
Payer: MEDICARE

## 2023-12-05 ENCOUNTER — APPOINTMENT (OUTPATIENT)
Facility: HOSPITAL | Age: 88
DRG: 981 | End: 2023-12-05
Payer: MEDICARE

## 2023-12-05 VITALS
DIASTOLIC BLOOD PRESSURE: 48 MMHG | BODY MASS INDEX: 25.86 KG/M2 | TEMPERATURE: 97.6 F | HEART RATE: 93 BPM | WEIGHT: 155.2 LBS | OXYGEN SATURATION: 100 % | SYSTOLIC BLOOD PRESSURE: 138 MMHG | RESPIRATION RATE: 18 BRPM | HEIGHT: 65 IN

## 2023-12-05 PROBLEM — I50.9 ACUTE EXACERBATION OF CHF (CONGESTIVE HEART FAILURE): Status: RESOLVED | Noted: 2021-11-03 | Resolved: 2023-12-05

## 2023-12-05 PROBLEM — R09.02 HYPOXIA: Status: RESOLVED | Noted: 2023-11-25 | Resolved: 2023-12-05

## 2023-12-05 PROBLEM — J44.1 COPD EXACERBATION: Status: RESOLVED | Noted: 2023-11-25 | Resolved: 2023-12-05

## 2023-12-05 PROBLEM — J96.01 ACUTE HYPOXIC RESPIRATORY FAILURE: Status: RESOLVED | Noted: 2023-10-29 | Resolved: 2023-12-05

## 2023-12-05 LAB
BH CV UPPER VENOUS LEFT AXILLARY AUGMENT: NORMAL
BH CV UPPER VENOUS LEFT AXILLARY COMPRESS: NORMAL
BH CV UPPER VENOUS LEFT AXILLARY PHASIC: NORMAL
BH CV UPPER VENOUS LEFT AXILLARY SPONT: NORMAL
BH CV UPPER VENOUS LEFT BASILIC FOREARM COMPRESS: NORMAL
BH CV UPPER VENOUS LEFT BRACHIAL AUGMENT: NORMAL
BH CV UPPER VENOUS LEFT BRACHIAL COMPRESS: NORMAL
BH CV UPPER VENOUS LEFT BRACHIAL PHASIC: NORMAL
BH CV UPPER VENOUS LEFT BRACHIAL SPONT: NORMAL
BH CV UPPER VENOUS LEFT CEPHALIC FOREARM COMPRESS: NORMAL
BH CV UPPER VENOUS LEFT CEPHALIC UPPER COMPRESS: NORMAL
BH CV UPPER VENOUS LEFT INTERNAL JUGULAR AUGMENT: NORMAL
BH CV UPPER VENOUS LEFT INTERNAL JUGULAR COMPRESS: NORMAL
BH CV UPPER VENOUS LEFT INTERNAL JUGULAR PHASIC: NORMAL
BH CV UPPER VENOUS LEFT INTERNAL JUGULAR SPONT: NORMAL
BH CV UPPER VENOUS LEFT RADIAL AUGMENT: NORMAL
BH CV UPPER VENOUS LEFT RADIAL COMPRESS: NORMAL
BH CV UPPER VENOUS LEFT RADIAL PHASIC: NORMAL
BH CV UPPER VENOUS LEFT RADIAL SPONT: NORMAL
BH CV UPPER VENOUS LEFT SUBCLAVIAN AUGMENT: NORMAL
BH CV UPPER VENOUS LEFT SUBCLAVIAN PHASIC: NORMAL
BH CV UPPER VENOUS LEFT SUBCLAVIAN SPONT: NORMAL
BH CV UPPER VENOUS LEFT ULNAR AUGMENT: NORMAL
BH CV UPPER VENOUS LEFT ULNAR COMPRESS: NORMAL
BH CV UPPER VENOUS LEFT ULNAR PHASIC: NORMAL
BH CV UPPER VENOUS LEFT ULNAR SPONT: NORMAL
BH CV UPPER VENOUS RIGHT INTERNAL JUGULAR AUGMENT: NORMAL
BH CV UPPER VENOUS RIGHT INTERNAL JUGULAR COMPRESS: NORMAL
BH CV UPPER VENOUS RIGHT INTERNAL JUGULAR PHASIC: NORMAL
BH CV UPPER VENOUS RIGHT INTERNAL JUGULAR SPONT: NORMAL
BH CV UPPER VENOUS RIGHT SUBCLAVIAN AUGMENT: NORMAL
BH CV UPPER VENOUS RIGHT SUBCLAVIAN PHASIC: NORMAL
BH CV UPPER VENOUS RIGHT SUBCLAVIAN SPONT: NORMAL

## 2023-12-05 PROCEDURE — 93971 EXTREMITY STUDY: CPT | Performed by: SURGERY

## 2023-12-05 PROCEDURE — 94799 UNLISTED PULMONARY SVC/PX: CPT

## 2023-12-05 PROCEDURE — 93971 EXTREMITY STUDY: CPT

## 2023-12-05 PROCEDURE — 99232 SBSQ HOSP IP/OBS MODERATE 35: CPT | Performed by: STUDENT IN AN ORGANIZED HEALTH CARE EDUCATION/TRAINING PROGRAM

## 2023-12-05 PROCEDURE — 94664 DEMO&/EVAL PT USE INHALER: CPT

## 2023-12-05 PROCEDURE — 25010000002 HEPARIN (PORCINE) PER 1000 UNITS: Performed by: SURGERY

## 2023-12-05 RX ORDER — ONDANSETRON 4 MG/1
4 TABLET, ORALLY DISINTEGRATING ORAL EVERY 12 HOURS PRN
Qty: 30 TABLET | Refills: 1 | Status: SHIPPED | OUTPATIENT
Start: 2023-12-05 | End: 2024-01-04

## 2023-12-05 RX ORDER — BUMETANIDE 1 MG/1
3 TABLET ORAL DAILY
Qty: 90 TABLET | Refills: 0 | Status: SHIPPED | OUTPATIENT
Start: 2023-12-06 | End: 2024-01-05

## 2023-12-05 RX ORDER — LIDOCAINE AND PRILOCAINE 25; 25 MG/G; MG/G
1 CREAM TOPICAL AS NEEDED
Status: DISCONTINUED | OUTPATIENT
Start: 2023-12-05 | End: 2023-12-05 | Stop reason: SDUPTHER

## 2023-12-05 RX ADMIN — BUMETANIDE 3 MG: 1 TABLET ORAL at 08:33

## 2023-12-05 RX ADMIN — LINAGLIPTIN 5 MG: 5 TABLET, FILM COATED ORAL at 08:33

## 2023-12-05 RX ADMIN — Medication 10 ML: at 08:33

## 2023-12-05 RX ADMIN — IPRATROPIUM BROMIDE AND ALBUTEROL SULFATE 3 ML: .5; 3 SOLUTION RESPIRATORY (INHALATION) at 08:15

## 2023-12-05 RX ADMIN — PANTOPRAZOLE SODIUM 40 MG: 40 TABLET, DELAYED RELEASE ORAL at 08:33

## 2023-12-05 RX ADMIN — HYDRALAZINE HYDROCHLORIDE 25 MG: 25 TABLET, FILM COATED ORAL at 08:33

## 2023-12-05 RX ADMIN — PANTOPRAZOLE SODIUM 40 MG: 40 TABLET, DELAYED RELEASE ORAL at 17:15

## 2023-12-05 RX ADMIN — CETIRIZINE HYDROCHLORIDE 10 MG: 10 TABLET, FILM COATED ORAL at 08:33

## 2023-12-05 RX ADMIN — SEVELAMER CARBONATE 800 MG: 800 TABLET, FILM COATED ORAL at 17:15

## 2023-12-05 RX ADMIN — IPRATROPIUM BROMIDE AND ALBUTEROL SULFATE 3 ML: .5; 3 SOLUTION RESPIRATORY (INHALATION) at 13:47

## 2023-12-05 RX ADMIN — SEVELAMER CARBONATE 800 MG: 800 TABLET, FILM COATED ORAL at 11:53

## 2023-12-05 RX ADMIN — HEPARIN SODIUM 5000 UNITS: 5000 INJECTION INTRAVENOUS; SUBCUTANEOUS at 08:33

## 2023-12-05 RX ADMIN — SEVELAMER CARBONATE 800 MG: 800 TABLET, FILM COATED ORAL at 08:33

## 2023-12-05 RX ADMIN — CILOSTAZOL 100 MG: 100 TABLET ORAL at 08:33

## 2023-12-05 RX ADMIN — CARVEDILOL 25 MG: 12.5 TABLET, FILM COATED ORAL at 08:33

## 2023-12-05 RX ADMIN — CARVEDILOL 25 MG: 12.5 TABLET, FILM COATED ORAL at 17:15

## 2023-12-05 RX ADMIN — BUDESONIDE AND FORMOTEROL FUMARATE DIHYDRATE 2 PUFF: 160; 4.5 AEROSOL RESPIRATORY (INHALATION) at 08:15

## 2023-12-05 NOTE — DISCHARGE SUMMARY
[facility]         DISCHARGE SUMMARY    Patient Name: Charlette Rai  : 1935  MRN: [id number]    Date of Admission: 2023  Date of Discharge:   2023  Primary Care Physician: Rafael Lyons MD    Consults       Date and Time Order Name Status Description    2023 11:07 AM Inpatient Pulmonology Consult Completed     2023  8:05 AM Inpatient Cardiology Consult      2023  5:09 PM Inpatient Vascular Surgery Consult Completed     2023 12:09 PM Inpatient Nephrology Consult Completed     2023  3:43 PM IP General Consult (Use specialty-specific consult if known)      11/10/2023  9:09 AM Inpatient Nephrology Consult Completed     10/29/2023  9:42 AM Inpatient Nephrology Consult Completed             Presenting Problem:   Acute exacerbation of chronic obstructive pulmonary disease (COPD) [J44.1]  Hypoxia [R09.02]  COPD exacerbation [J44.1]    Active and Resolved Hospital Problems:  Active Hospital Problems    Diagnosis POA   • Aortic stenosis, moderate [I35.0] Unknown   • Essential hypertension [I10] Yes   • GERD (gastroesophageal reflux disease) [K21.9] Yes   • Stage 4 chronic kidney disease [N18.4] Yes   • Type 2 diabetes mellitus without complication [E11.9] Yes      Resolved Hospital Problems    Diagnosis POA   • COPD exacerbation [J44.1] Yes   • Hypoxia [R09.02] Yes   • Acute hypoxic respiratory failure [J96.01] Yes   • Acute exacerbation of CHF (congestive heart failure) [I50.9] Yes         Hospital Course     Hospital Course:  Charlette Rai is a 88 y.o. female admitted to hospital for increasing shortness of breath, patient has recurrent admission to hospital for same problem.  Patient has COPD as well as CHF.  She was started on IV diuretics as well as neb treatments and steroids.    Patient was seen by her consultant specialist Dr. García for nephrology as well as cardiology Dr. Sanchez.  After diuresing patient started to feel better but she still have  significant edema.  Dr. García recommended starting patient on dialysis as it was planned for several months and in process.    After discussion with the family patient agreed and dialysis performed.  Patient has been feeling better since dialysis done patient had 2 dialysis performed on her in last 3 days.  She is feeling better.  She was advised to go to inpatient rehab again she refused it.  She will be discharged home today with outpatient follow-up, outpatient dialysis was set up for Monday Wednesday and Friday.    The day of discharge patient was feeling much better no chest pain no shortness of breath, less anxious.  Edema has improved significantly.        DISCHARGE.: Follow Up Recommendations for labs and diagnostics:   Patient stable ready for discharge.  Advised to take medications as prescribed on discharge.  Recommended follow-up with consultants as advised.  Patient advised to return to ER if symptoms get worse.  Patient advised to follow-up with me/PCP post discharge in 1 week.      Day of Discharge     Vital Signs:  Temp:  [97.9 °F (36.6 °C)-98.1 °F (36.7 °C)] 97.9 °F (36.6 °C)  Heart Rate:  [] 92  Resp:  [12-18] 18  BP: (126-151)/(38-51) 126/42  Flow (L/min):  [2] 2    Physical Exam:    Elderly female not in acute distress.  Heart regular.  Grade 2 systolic ejection murmur.  Lungs clear, diminished breath sounds.  Abdomen soft.  Extremities with some edema but improved significantly      Pertinent  and/or Most Recent Results     LAB RESULTS:      Lab 12/02/23  0530 11/30/23  0504   WBC 7.35 6.35   HEMOGLOBIN 7.9* 8.8*   HEMATOCRIT 25.7* 28.7*   PLATELETS 197 238   MCV 96.6 95.0         Lab 12/02/23  0530 12/01/23  0453 11/30/23  0504   SODIUM 138 136 138   POTASSIUM 4.3 4.8 4.2   CHLORIDE 100 100 99   CO2 27.4 24.6 25.0   ANION GAP 10.6 11.4 14.0   BUN 99* 93* 106*   CREATININE 2.88* 2.63* 2.95*   EGFR 15.4* 17.2* 15.0*   GLUCOSE 91 131* 92   CALCIUM 8.5* 8.2* 8.6   MAGNESIUM  --   --  1.9    PHOSPHORUS 3.7  --  4.7*         Lab 12/02/23  0530 11/30/23  0504   ALBUMIN 3.2* 3.3*                     Brief Urine Lab Results  (Last result in the past 365 days)        Color   Clarity   Blood   Leuk Est   Nitrite   Protein   CREAT   Urine HCG        11/09/23 0431 Yellow   Clear   Negative   Negative   Negative   100 mg/dL (2+)                 Microbiology Results (last 10 days)       ** No results found for the last 240 hours. **            PROCEDURES:    XR Chest 1 View    Result Date: 11/27/2023  Impression:   Slight increasing right interstitial opacities which could reflect edema.  More conspicuous blunting of right costophrenic angle, a trace effusion is possible.       DEO FELIPE MD       Electronically Signed and Approved By: DEO FELIPE MD on 11/27/2023 at 11:00             XR Hips Bilateral With or Without Pelvis 2 View    Result Date: 11/10/2023  Impression:   No acute fracture or acute malalignment is identified.  Moderate to severe osteoarthritis is suggested involving the right hip joint.  Please see above comments for further detail.     Please note that portions of this note were completed with a voice recognition program.  OLMAN BAIG JR, MD       Electronically Signed and Approved By: OLMAN BAIG JR, MD on 11/10/2023 at 23:07              XR Chest 1 View    Result Date: 11/9/2023  Impression:  Favorable progression is suggested radiographically since 10/29/2023 with decreased bilateral airspace opacities.  Minimal if any airspace disease persists on the current study.  The findings may represent interval improvement in pulmonary edema or infectious multifocal pneumonia.  The patient has been extubated since 10/29/2023.      Please note that portions of this note were completed with a voice recognition program.  OLMAN BAIG JR, MD       Electronically Signed and Approved By: OLMAN BAIG JR, MD on 11/09/2023 at 5:44               Results for orders placed during the  hospital encounter of 06/27/22    Doppler Ankle Brachial Index Single Level CAR    Interpretation Summary  · Right Conclusion: The right GILLIAN is moderately reduced. Moderate digital ischemia.  · Left Conclusion: The left GILLIAN is mildly reduced. Mild digital ischemia.      Results for orders placed during the hospital encounter of 06/27/22    Doppler Ankle Brachial Index Single Level CAR    Interpretation Summary  · Right Conclusion: The right GILLIAN is moderately reduced. Moderate digital ischemia.  · Left Conclusion: The left GILLIAN is mildly reduced. Mild digital ischemia.      Results for orders placed during the hospital encounter of 10/29/23    Adult Transthoracic Echo Complete W/ Cont if Necessary Per Protocol    Interpretation Summary  •  Left ventricular systolic function is normal. Calculated left ventricular EF = 56.8%  •  Left ventricular wall thickness is consistent with mild asymmetric hypertrophy.  •  Left ventricular diastolic function is consistent with (grade I) impaired relaxation.  •  Left atrial volume is moderately increased.  •  Moderate aortic valve stenosis is present.  •  Estimated right ventricular systolic pressure from tricuspid regurgitation is normal (<35 mmHg).  •  Mild dilation of the aortic root is present.      Labs Pending at Discharge:        Discharge Details        Discharge Medications        New Medications        Instructions Start Date   bumetanide 1 MG tablet  Commonly known as: BUMEX   3 mg, Oral, Daily   Start Date: December 6, 2023     ondansetron ODT 4 MG disintegrating tablet  Commonly known as: ZOFRAN-ODT   4 mg, Translingual, Every 12 Hours PRN             Changes to Medications        Instructions Start Date   levocetirizine 5 MG tablet  Commonly known as: XYZAL  What changed: when to take this   TAKE 1 TABLET DAILY             Continue These Medications        Instructions Start Date   albuterol sulfate  (90 Base) MCG/ACT inhaler  Commonly known as: PROVENTIL  HFA;VENTOLIN HFA;PROAIR HFA   2 puffs, Inhalation, Every 4 Hours PRN      aspirin 81 MG chewable tablet   81 mg, Oral, Daily      carvedilol 25 MG tablet  Commonly known as: Coreg   25 mg, Oral, 2 Times Daily With Meals      cilostazol 100 MG tablet  Commonly known as: PLETAL   1 tablet, Oral, 2 times daily      dexlansoprazole 60 MG capsule  Commonly known as: DEXILANT   60 mg, Oral, Daily      ergocalciferol 1.25 MG (96257 UT) capsule  Commonly known as: ERGOCALCIFEROL   50,000 Units, Oral, Every 14 Days      hydrALAZINE 25 MG tablet  Commonly known as: APRESOLINE   25 mg, Oral, 2 Times Daily      linagliptin 5 MG tablet tablet  Commonly known as: Tradjenta   5 mg, Oral, Daily      nitroglycerin 0.4 MG SL tablet  Commonly known as: NITROSTAT   Place 1 tablet under the tongue Every 5 (Five) Minutes As Needed for Chest Pain.      polycarbophil 625 MG tablet tablet   625 mg, Oral, Daily      rosuvastatin 20 MG tablet  Commonly known as: CRESTOR   20 mg, Oral, Daily      sennosides-docusate 8.6-50 MG per tablet  Commonly known as: Senokot S   1 tablet, Oral, Nightly      sevelamer 800 MG tablet  Commonly known as: RENVELA   800 mg, Oral, 3 Times Daily With Meals      Symbicort 160-4.5 MCG/ACT inhaler  Generic drug: budesonide-formoterol   2 puffs, Inhalation, 2 times daily      traMADol 50 MG tablet  Commonly known as: ULTRAM   50 mg, Oral, Every 8 Hours PRN             Stop These Medications      amLODIPine 10 MG tablet  Commonly known as: NORVASC     furosemide 80 MG tablet  Commonly known as: Lasix              No Known Allergies      Discharge Disposition:    Home or Self Care    Diet:    Heart healthy renal diet    Discharge Activity:     Activity Instructions       Activity as Tolerated              Future Appointments   Date Time Provider Department Center   1/19/2024  1:00 PM León Sanchez MD [facility]       Additional Instructions for the Follow-ups that You Need to Schedule       Discharge Follow-up  with PCP   As directed       Currently Documented PCP:    Rafael Lyons MD    PCP Phone Number:    [phone]     Follow Up Details: In 1 to 2 weeks        Discharge Follow-up with Specified Provider: With Dr. García for hemodialysis as scheduled   As directed      To: With Dr. García for hemodialysis as scheduled                Time spent on Discharge including face to face service: 31 minutes.            I have dictated this note utilizing Dragon Dictation.             Please note that portions of this note were completed with a voice recognition program.             Part of this note may be an electronic transcription/translation of spoken language to printed text         using the Dragon Dictation System.       Electronically signed by Rafael Lyons MD, 12/05/23, 9:37 AM EST.    Addendum      TCM phone call done    Spoke to patient    Feeling much better, stronger  Meds reconciled over phone  No issues about meds taking as directed  To see me in office next week        Electronically signed by Rafael Lyons MD, 12/07/23, 6:09 PM EST.

## 2023-12-05 NOTE — PLAN OF CARE
Goal Outcome Evaluation:  Plan of Care Reviewed With: patient        Progress: improving  Outcome Evaluation: Pt had c/o pain in right leg, see MAR-no open areas or bruises noted. Pt edema in right arm down to 2+ non-pitting this shift-still mynor in color. Pt is in no apparent distress at this time, denies any needs currently, call light in reach.         
Goal Outcome Evaluation:  Plan of Care Reviewed With: patient        Progress: improving  Outcome Evaluation: Pt remained A&Ox4 this shift. Also, pt was titrated to 1L via nasal cannula maintaining stable oxygen saturation. Pt spent most of shift up in chair and tolerated well. Pt denied pain this shift. All other vital signs remained stable.         
Goal Outcome Evaluation:  Plan of Care Reviewed With: patient        Progress: improving  Outcome Evaluation: Very pleasant patient rested in bed throughout the day. No complaints of pain or discomfort. Patient went down for her first dialysis treatment today and she stated it went much better than she expected it to. Patient able to eat all meals and continues to get up to the bsc to urinate. No concerns at this time.         
Goal Outcome Evaluation:  Plan of Care Reviewed With: patient        Progress: no change  Outcome Evaluation: Patient presents with limitations affecting strength, activity tolerance, and balance impacting patient's ability to return home safely and independently.  The skills of a therapist will be required to safely and effectively implement the following treatment plan to restore maximal level of function      Anticipated Discharge Disposition (OT): skilled nursing facility, inpatient rehabilitation facility  
Goal Outcome Evaluation:  Plan of Care Reviewed With: patient        Progress: no change  Outcome Evaluation: Pt c/o pain/discomfort this shift, administered prn pain med as ordered. Pt is currentlty NPO as ordered since midnight. Pt will have graft placed in L arm in the afternoon, dayshift obtained surgical consent at bedside yestereday. Pt is currently resting with no apparent distress at this time. No new issues or new needs noted at this time.         
Goal Outcome Evaluation:  Plan of Care Reviewed With: patient        Progress: no change  Outcome Evaluation: Pt c/o pain/discomfort this shift, administered prn pain med as ordered. Pt rested well this shift with no apparent distress at this time. Call light in reach. No new issues or new needs noted at this time.         
Goal Outcome Evaluation:  Plan of Care Reviewed With: patient        Progress: no change  Outcome Evaluation: Pt continues to have SOA with exertion. Pt had no c/o pain this shift so far. Pt had family at bedside and talked with hospice, new code status noted. Pt is in no apparent distress at this time, denies any needs currently, call light in reach.         
Goal Outcome Evaluation:  Plan of Care Reviewed With: patient        Progress: no change  Outcome Evaluation: Pt denied pain/discomfort this shift. Pt had L AV graft placed yesterday, tolerated surgery well. L AV graft is intact with no signs of complications. Pt is currently resting with no apparent distress at this time. No new issues or needs noted at this time.         
Goal Outcome Evaluation:  Plan of Care Reviewed With: patient        Progress: no change  Outcome Evaluation: Pt denied pain/discomfort this shift. Pt will have dialysis sometime today and will likely go home afterwards. No new issues or new needs noted at this time.         
Goal Outcome Evaluation:  Plan of Care Reviewed With: patient        Progress: no change  Outcome Evaluation: Pt presents with limitations that impede their ability to safely and independently transfer and ambulate. The skills of a therapist will be required to safely and effectively implement the following treatment plan to restore maximal level of function.      Anticipated Discharge Disposition (PT): home with 24/7 care, home with home health, sub acute care setting  
Goal Outcome Evaluation:  Plan of Care Reviewed With: patient        Progress: no change  Outcome Evaluation: Pt remained A&Ox4 this shift. Also, pt remained on 1L via nasal cannula maintaining stable oxygen saturation. Pt denied pain this shift. All other vital signs remained stable. Pt is to dc to home this shift.         
Goal Outcome Evaluation:  Plan of Care Reviewed With: patient        Progress: no change  Outcome Evaluation: patient alert and oriented this shift. no c/o pain. dialysis 12/4. up to chair. will likely dc tomorrow 12/5. no other changes at this time         
Goal Outcome Evaluation:  Plan of Care Reviewed With: patient      Pt alert and oriented. Shortness of breath noted and increased sob with minimal exertion. Lungs clear and diminished. 3l nasal cannula. To our floor from ER.                
Goal Outcome Evaluation:  Plan of Care Reviewed With: patient   Pt alert and oriented. Continues to be short of breath and worsening with minimal exertion. Has stayed in bed today due to not feeling well and breathing difficulty. Did get up to bedside commode with assistance. Family has come to visit.                   
Goal Outcome Evaluation:  Plan of Care Reviewed With: patient, family        Progress: declining  Outcome Evaluation: Pt remained A&Ox4 this shift. Pt oxygen was increased to 8L high flow nasal cannula maintaining stable oxygen saturation. Pt underwent chest xray this shift and ABGs. Pt also had a positive severe sepsis screeening. MD notified. Pt heart rate was tachy this shift with flex telemetry in place. Pt also was hypertensive this shift. All other vital signs remained stable.         
Attending Syeda Rao: 85 y/o male with h/o vertigo , cad,presenting with dizziness and nausea. pt was eating and then became dizzy, vomited and now complaining of feeling unsteady. upon arrival symptoms somewhat improved but still present. neuro exam unremarkable however when pt tries to ambulate sensation of feeling unsteady. no recent trauma or injury. denies any chest discomfort. will obtain labs, ct head, ekg and re-eval. abd soft on exam making surgical pathology less likely

## 2023-12-05 NOTE — NURSING NOTE
Pt with 3+ swelling to entire LUE and bruising on posterior arm behind fistula site. Dr anthony and dr ruelas notified. New orders received from dr ruelas to wrap LUE with kerlix and to ice fistula site (15 min on and 15 min off). Educated pt to notify if pt starts losing any feeling in arm or hand.

## 2023-12-05 NOTE — PROGRESS NOTES
" LOS: 10 days   Patient Care Team:  Rafael Lyons MD as PCP - General (Internal Medicine)  Regis Mckeon MD as Consulting Physician (Radiation Oncology)  Carlton Casillas MD as Consulting Physician (Gastroenterology)  Susan Marcos APRN as Nurse Practitioner (Gastroenterology)    Chief Complaint: ESRD    Subjective     History of Present Illness  Pt is feeling a bit better.  Had some edema in left arm overnight  Better with elevation, ice.  No new complaints.    Subjective:  Symptoms:  Improved.  No shortness of breath, cough, chest pain, chest pressure or anxiety.    Diet:  Adequate intake.  No nausea or vomiting.    Activity level: Impaired due to weakness.    Pain:  She reports no pain.        History taken from: patient chart    Objective     Vital Sign Min/Max for last 24 hours  Temp  Min: 97.9 °F (36.6 °C)  Max: 98.1 °F (36.7 °C)   BP  Min: 126/42  Max: 151/51   Pulse  Min: 90  Max: 101   Resp  Min: 12  Max: 18   SpO2  Min: 97 %  Max: 100 %   Flow (L/min)  Min: 2  Max: 2   Weight  Min: 70.4 kg (155 lb 3.3 oz)  Max: 70.4 kg (155 lb 3.3 oz)     Flowsheet Rows      Flowsheet Row First Filed Value   Admission Height 165.1 cm (65\") Documented at 11/25/2023 1304   Admission Weight 75.3 kg (166 lb 0.1 oz) Documented at 11/25/2023 1304            I/O this shift:  In: 418 [P.O.:418]  Out: -   I/O last 3 completed shifts:  In: 360 [P.O.:360]  Out: 1500 [Urine:1000]    Objective:  General Appearance:  Comfortable, in no acute distress and not in pain.    Vital signs: (most recent): Blood pressure 126/42, pulse 92, temperature 97.9 °F (36.6 °C), temperature source Oral, resp. rate 18, height 165.1 cm (65\"), weight 70.4 kg (155 lb 3.3 oz), SpO2 98%, not currently breastfeeding.  Vital signs are normal.  No fever.    Output: Producing urine.    HEENT: Normal HEENT exam.    Lungs:  Normal respiratory rate.  She is not in respiratory distress.  There are decreased breath sounds.  No rales.    Heart: Normal " "rate.    Abdomen: Abdomen is soft and non-distended.  Bowel sounds are normal.   There is no abdominal tenderness.     Extremities: Normal range of motion.  There is dependent edema.  (Left upper extremity AV graft in place, weak thrill, good bruit.  Mild bruising.)  Pulses: Distal pulses are intact.    Neurological: Patient is alert and oriented to person, place and time.    Pupils:  Pupils are equal, round, and reactive to light.    Skin:  Warm and dry.                Results Review:     I reviewed the patient's new clinical results.    WBC No results found for: \"WBC\"     HGB No results found for: \"HGB\"     HCT No results found for: \"HCT\"     Platlets No results found for: \"LABPLAT\"   MCV No results found for: \"MCV\"         Sodium No results found for: \"NA\"     Potassium No results found for: \"K\"     Chloride No results found for: \"CL\"     CO2 No results found for: \"CO2\"     BUN No results found for: \"BUN\"     Creatinine No results found for: \"CREATININE\"     Calcium No results found for: \"CALCIUM\"     PO4 No results found for: \"CAPO4\"   Albumin No results found for: \"ALBUMIN\"     Magnesium No results found for: \"MG\"     Uric Acid No results found for: \"URICACID\"     Medication Review:   budesonide-formoterol, 2 puff, Inhalation, BID - RT  bumetanide, 3 mg, Oral, Daily  carvedilol, 25 mg, Oral, BID With Meals  cetirizine, 10 mg, Oral, Daily  cilostazol, 100 mg, Oral, BID  epoetin mita/mita-epbx, 10,000 Units, Intravenous, Once per day on Mon Wed Fri  fluticasone, 2 spray, Each Nare, Nightly  heparin (porcine), 5,000 Units, Subcutaneous, Q12H  hydrALAZINE, 25 mg, Oral, BID  ipratropium-albuterol, 3 mL, Nebulization, TID - RT  linagliptin, 5 mg, Oral, Daily  pantoprazole, 40 mg, Oral, BID AC  rosuvastatin, 20 mg, Oral, Nightly  sennosides-docusate, 1 tablet, Oral, Nightly  sevelamer, 800 mg, Oral, TID With Meals  sodium chloride, 10 mL, Intravenous, Q12H          Assessment & Plan       Essential hypertension    " GERD (gastroesophageal reflux disease)    Stage 4 chronic kidney disease    Type 2 diabetes mellitus without complication    Aortic stenosis, moderate      Assessment & Plan  - ESRD, progressive renal disease from diabetic nephropathy on a background of hypertension and congestive heart failure and bilateral renal artery stenosis.  Volume overload is slowly improving.  Status post left upper extremity AV graft, AcuSeal.  First dialysis 12/2/2023.  Outpatient dialysis set at [facility] in [place].  Orders called to the clinic.  Left arm slight edema, keep elevated  OK for d/c from renal.  Continue p.o. Bumex.    - Hypertension, blood pressure is better with improved volume control.  - Bilateral renal artery stenosis, aldosterone/renin ratio was not elevated.  No intervention.  - Congestive heart failure, diastolic dysfunction, known preserved LV function.  - History of lung cancer 2005.  Followed by pulmonary.  - Diabetes with complications.  - Anemia, severe.  tsat ok.  Started CASSANDRA, goal hemoglobin above 10.  Change Epogen to IV form with dialysis.  - Hyperkalemia, secondary to renal failure, combination of diuretics should be helpful, on low potassium diet.    - Metabolic acidosis, mild,   On oral sodium bicarb.  Improved now.    Elliott Aviles MD  12/05/23  10:45 EST

## 2023-12-05 NOTE — PROGRESS NOTES
Pulmonary / Critical Care Progress Note      Patient Name: Charlette Rai  : 1935  MRN: [id number]  Attending:  Rafael Lyons MD  Date of admission: 2023    Subjective   Subjective   Follow-up for hypoxia    Over past 24 hours:  Doing well this morning  Tolerated dialysis yesterday  Feeling better overall  Sitting up in chair  Some lower extremity edema noted    Review of Systems  General: Denied complaints  Cardiovascular:  Denied complaints  Respiratory: Denied complaints  Gastrointestinal: Denied complaints        Objective   Objective     Vitals:   Temp:  [97.9 °F (36.6 °C)-98.1 °F (36.7 °C)] 98.1 °F (36.7 °C)  Heart Rate:  [] 92  Resp:  [12-18] 18  BP: (104-151)/(38-51) 104/45  Flow (L/min):  [1-2] 1    Physical Exam   Vital Signs Reviewed   General:  WDWN, Alert, NAD.  Elderly sitting up in chair  HEENT:  PERRL, EOMI.  OP, nares clear  Chest:  good aeration, clear to auscultation bilaterally, no work of breathing noted on 2 L nasal cannula  CV: RRR, no MGR, pulses 2+, equal.  Abd:  Soft, NT, ND, + BS  EXT:  no clubbing, no cyanosis, no edema  Neuro:  A&Ox3, CN grossly intact, no focal deficits.  Skin: No rashes or lesions noted      Result Review    Result Review:  I have personally reviewed the results from the time of this admission to 2023 13:04 EST and agree with these findings:  []  Laboratory  []  Microbiology  []  Radiology  []  EKG/Telemetry   []  Cardiology/Vascular   []  Pathology  []  Old records  []  Other:  Most notable findings include:   -     Assessment & Plan   Assessment / Plan     Active Hospital Problems:  Active Hospital Problems    Diagnosis     Aortic stenosis, moderate     Essential hypertension     GERD (gastroesophageal reflux disease)     Stage 4 chronic kidney disease     Type 2 diabetes mellitus without complication      Impression:  COPD exacerbation  Acute hypoxic respiratory failure  Heart failure exacerbation  Lung nodule  History of GERD  History  hypertension  History ESRD on HD  History of diabetes  History of lung cancer    Plan:  -Currently on 2 L nasal cannula.  Patient uses 2 to 3 L at baseline  -ESRD on HD per nephrology  -Continue nebs and bronchopulmonary hygiene  -Overall improving in volume status.  -Will need outpatient follow-up with repeat chest CT noncontrast in 3 months due to endobronchial abnormalities seen on CT.  This is likely secretions  -Continue with LAMA/LABA/ICS on discharge  -Left upper extremity appear more edematous today.  Will order Dopplers.  Will follow peripherally on this  -Stable for discharge from pulmonary standpoint.  Pulmonary will sign off.  Please call with questions    DVT prophylaxis:  Medical DVT prophylaxis orders are present.    CODE STATUS:   Medical Intervention Limits: NO intubation (DNI)  Level Of Support Discussed With: Patient  Code Status (Patient has no pulse and is not breathing): No CPR (Do Not Attempt to Resuscitate)  Medical Interventions (Patient has pulse or is breathing): Limited Support      Labs, images, and medications personally reviewed.    Discussed with patient    Electronically signed by Rosalinda Bocanegra MD, 12/05/23, 1:04 PM EST.

## 2023-12-06 NOTE — OUTREACH NOTE
Prep Survey      Flowsheet Row Responses   [facility] facility patient discharged from? [facility]   Is LACE score < 7 ? No   Eligibility Readm Mgmt   Discharge diagnosis COPD exacerbation   Does the patient have one of the following disease processes/diagnoses(primary or secondary)? COPD   Does the patient have Home health ordered? Yes   What is the Home health agency?  [facility] [place]   Is there a DME ordered? No   Prep survey completed? Yes            LITO HART - Registered Nurse

## 2023-12-11 ENCOUNTER — READMISSION MANAGEMENT (OUTPATIENT)
Dept: CALL CENTER | Facility: HOSPITAL | Age: 88
End: 2023-12-11
Payer: MEDICARE

## 2023-12-11 NOTE — OUTREACH NOTE
COPD/PN Week 1 Survey      Flowsheet Row Responses   [facility] facility patient discharged from? [facility]   Does the patient have one of the following disease processes/diagnoses(primary or secondary)? COPD   Week 1 attempt successful? No   Unsuccessful attempts Attempt 1  [Pt currently at HD on MWF. Better to call Tues/TH]            Luanne ARREOLA - Registered Nurse

## 2023-12-14 ENCOUNTER — READMISSION MANAGEMENT (OUTPATIENT)
Dept: CALL CENTER | Facility: HOSPITAL | Age: 88
End: 2023-12-14
Payer: MEDICARE

## 2023-12-14 NOTE — OUTREACH NOTE
COPD/PN Week 1 Survey      Flowsheet Row Responses   [facility] patient discharged from? [facility]   Does the patient have one of the following disease processes/diagnoses(primary or secondary)? COPD   Week 1 attempt successful? Yes   Call start time 1659   Call end time 1702   Discharge diagnosis COPD exacerbation   Meds reviewed with patient/caregiver? Yes   Is the patient having any side effects they believe may be caused by any medication additions or changes? No   Does the patient have all medications ordered at discharge? Yes   Is the patient taking all medications as directed (includes completed medication regime)? Yes   Comments regarding appointments HD M, W, F   Does the patient have a primary care provider?  Yes   Does the patient have an appointment with their PCP or specialist within 7 days of discharge? Greater than 7 days   What is preventing the patient from scheduling follow up appointments within 7 days of discharge? Earlier appointment not available   Nursing Interventions Verified appointment date/time/provider   Has the patient kept scheduled appointments due by today? N/A   What is the Home health agency?  [facility] [place]   Has home health visited the patient within 72 hours of discharge? Yes   Pulse Ox monitoring None   Did the patient receive a copy of their discharge instructions? Yes   Nursing interventions Reviewed instructions with patient   What is the patient's perception of their health status since discharge? Improving   Nursing Interventions Nurse provided patient education   Is the patient/caregiver able to teach back the hierarchy of who to call/visit for symptoms/problems? PCP, Specialist, Home health nurse, Urgent Care, ED, 911 Yes   Is the patient able to teach back COPD zones? Yes   Patient reports what zone on this call? Green Zone   Green Zone Reports doing well, Breathing without shortness of breath, Usual activity and exercise level, Usual amounts of  cough and phlegm/mucous, Appetite is good, Slept well last night   Green Zone interventions: Take daily medications, Continue regular exercise/diet plan   Week 1 call completed? Yes   Call end time 1702            Jennifer BHAKTA - Registered Nurse

## 2024-01-24 ENCOUNTER — TELEPHONE (OUTPATIENT)
Dept: VASCULAR SURGERY | Facility: HOSPITAL | Age: 89
End: 2024-01-24
Payer: MEDICARE

## 2024-01-24 ENCOUNTER — OFFICE VISIT (OUTPATIENT)
Dept: VASCULAR SURGERY | Facility: HOSPITAL | Age: 89
End: 2024-01-24
Payer: MEDICARE

## 2024-01-24 VITALS
TEMPERATURE: 98.3 F | SYSTOLIC BLOOD PRESSURE: 162 MMHG | OXYGEN SATURATION: 97 % | DIASTOLIC BLOOD PRESSURE: 60 MMHG | RESPIRATION RATE: 18 BRPM | HEART RATE: 79 BPM

## 2024-01-24 DIAGNOSIS — N18.6 ESRD ON DIALYSIS: Primary | ICD-10-CM

## 2024-01-24 DIAGNOSIS — Z99.2 ESRD ON DIALYSIS: Primary | ICD-10-CM

## 2024-01-24 PROCEDURE — G0463 HOSPITAL OUTPT CLINIC VISIT: HCPCS | Performed by: SURGERY

## 2024-01-24 PROCEDURE — 99024 POSTOP FOLLOW-UP VISIT: CPT | Performed by: SURGERY

## 2024-01-24 PROCEDURE — 1159F MED LIST DOCD IN RCRD: CPT | Performed by: SURGERY

## 2024-01-24 PROCEDURE — 1160F RVW MEDS BY RX/DR IN RCRD: CPT | Performed by: SURGERY

## 2024-01-24 NOTE — TELEPHONE ENCOUNTER
Patient switched insurance on 1/1/24. She currently has [employer]. Patient understands that the provider will be billed out of network.She would like to proceed with the office visit.

## 2024-01-24 NOTE — PROGRESS NOTES
[facility]   Follow up Office    Patient Name: Charlette Rai  : 1935  MRN: [id number]  Primary Care Physician:  Rafael Lyons MD      Subjective   Subjective     HPI:    Charlette Rai is a 88 y.o. female here for follow-up after creation of a left arm arteriovenous graft 2023.  She has been having swelling although there has been some slight improvement.  She also points to some scabs in her left axillary wound area.  The access is working well.      Objective     Vitals:   Temp:  [98.3 °F (36.8 °C)] 98.3 °F (36.8 °C)  Heart Rate:  [79] 79  Resp:  [18] 18  BP: (162)/(60) 162/60    Physical Exam      General: Alert, no acute distress  Wound: Healing well, no signs of infection.  AV graft: Excellent bruit and thrill.  Left arm: Mild edema.    Assessment & Plan   Assessment / Plan     Diagnoses and all orders for this visit:    1. ESRD on dialysis (Primary)  -     Duplex Hemodialysis Access CAR       Assessment/Plan:   Satisfactory progress following creation of left arm arteriovenous graft.  She is concerned due to the edema.  Recommend further observation with a follow-up in a duplex in 6 weeks.        Electronically signed by Wan Barksdale MD, 24, 9:22 AM EST.

## 2024-01-30 ENCOUNTER — PATIENT MESSAGE (OUTPATIENT)
Dept: VASCULAR SURGERY | Facility: HOSPITAL | Age: 89
End: 2024-01-30
Payer: MEDICARE

## 2024-03-03 ENCOUNTER — APPOINTMENT (OUTPATIENT)
Dept: CT IMAGING | Facility: HOSPITAL | Age: 89
End: 2024-03-03
Payer: MEDICARE

## 2024-03-03 ENCOUNTER — HOSPITAL ENCOUNTER (EMERGENCY)
Facility: HOSPITAL | Age: 89
Discharge: ANOTHER HOSPITAL | End: 2024-03-03
Attending: EMERGENCY MEDICINE | Admitting: EMERGENCY MEDICINE
Payer: MEDICARE

## 2024-03-03 ENCOUNTER — HOSPITAL ENCOUNTER (OUTPATIENT)
Facility: HOSPITAL | Age: 89
Setting detail: OBSERVATION
Discharge: HOME | End: 2024-03-09
Attending: STUDENT IN AN ORGANIZED HEALTH CARE EDUCATION/TRAINING PROGRAM | Admitting: INTERNAL MEDICINE
Payer: MEDICARE

## 2024-03-03 VITALS
WEIGHT: 149.69 LBS | HEART RATE: 81 BPM | RESPIRATION RATE: 18 BRPM | SYSTOLIC BLOOD PRESSURE: 138 MMHG | BODY MASS INDEX: 24.94 KG/M2 | DIASTOLIC BLOOD PRESSURE: 75 MMHG | HEIGHT: 65 IN | OXYGEN SATURATION: 99 % | TEMPERATURE: 97.8 F

## 2024-03-03 DIAGNOSIS — S37.011A HEMATOMA OF RIGHT KIDNEY, INITIAL ENCOUNTER: ICD-10-CM

## 2024-03-03 DIAGNOSIS — R52 ACUTE PAIN: Primary | ICD-10-CM

## 2024-03-03 DIAGNOSIS — N18.6 ESRD ON DIALYSIS: ICD-10-CM

## 2024-03-03 DIAGNOSIS — Z99.2 ESRD ON DIALYSIS: ICD-10-CM

## 2024-03-03 DIAGNOSIS — N39.0 ACUTE UTI (URINARY TRACT INFECTION): ICD-10-CM

## 2024-03-03 DIAGNOSIS — R10.9 ACUTE RIGHT FLANK PAIN: Primary | ICD-10-CM

## 2024-03-03 PROBLEM — T14.8XXA HEMATOMA: Status: ACTIVE | Noted: 2024-03-03

## 2024-03-03 LAB
ALBUMIN SERPL-MCNC: 3.6 G/DL (ref 3.5–5.2)
ALBUMIN/GLOB SERPL: 1.4 G/DL
ALP SERPL-CCNC: 199 U/L (ref 39–117)
ALT SERPL W P-5'-P-CCNC: 9 U/L (ref 1–33)
ANION GAP SERPL CALCULATED.3IONS-SCNC: 10.4 MMOL/L (ref 5–15)
AST SERPL-CCNC: 11 U/L (ref 1–32)
BACTERIA UR QL AUTO: ABNORMAL /HPF
BASOPHILS # BLD AUTO: 0.04 10*3/MM3 (ref 0–0.2)
BASOPHILS NFR BLD AUTO: 0.4 % (ref 0–1.5)
BILIRUB SERPL-MCNC: 0.2 MG/DL (ref 0–1.2)
BILIRUB UR QL STRIP: NEGATIVE
BUN SERPL-MCNC: 42 MG/DL (ref 8–23)
BUN/CREAT SERPL: 11.3 (ref 7–25)
CALCIUM SPEC-SCNC: 8.2 MG/DL (ref 8.6–10.5)
CHLORIDE SERPL-SCNC: 101 MMOL/L (ref 98–107)
CLARITY UR: ABNORMAL
CO2 SERPL-SCNC: 26.6 MMOL/L (ref 22–29)
COLOR UR: YELLOW
CREAT SERPL-MCNC: 3.72 MG/DL (ref 0.57–1)
D-LACTATE SERPL-SCNC: 1.4 MMOL/L (ref 0.5–2)
DEPRECATED RDW RBC AUTO: 51.2 FL (ref 37–54)
EGFRCR SERPLBLD CKD-EPI 2021: 11.3 ML/MIN/1.73
EOSINOPHIL # BLD AUTO: 0.15 10*3/MM3 (ref 0–0.4)
EOSINOPHIL NFR BLD AUTO: 1.5 % (ref 0.3–6.2)
ERYTHROCYTE [DISTWIDTH] IN BLOOD BY AUTOMATED COUNT: 14.3 % (ref 12.3–15.4)
GLOBULIN UR ELPH-MCNC: 2.5 GM/DL
GLUCOSE BLDC GLUCOMTR-MCNC: 174 MG/DL (ref 70–130)
GLUCOSE SERPL-MCNC: 204 MG/DL (ref 65–99)
GLUCOSE UR STRIP-MCNC: NEGATIVE MG/DL
HCT VFR BLD AUTO: 30.8 % (ref 34–46.6)
HGB BLD-MCNC: 9.7 G/DL (ref 12–15.9)
HGB UR QL STRIP.AUTO: ABNORMAL
HYALINE CASTS UR QL AUTO: ABNORMAL /LPF
IMM GRANULOCYTES # BLD AUTO: 0.03 10*3/MM3 (ref 0–0.05)
IMM GRANULOCYTES NFR BLD AUTO: 0.3 % (ref 0–0.5)
KETONES UR QL STRIP: ABNORMAL
LEUKOCYTE ESTERASE UR QL STRIP.AUTO: ABNORMAL
LYMPHOCYTES # BLD AUTO: 0.55 10*3/MM3 (ref 0.7–3.1)
LYMPHOCYTES NFR BLD AUTO: 5.4 % (ref 19.6–45.3)
MCH RBC QN AUTO: 31 PG (ref 26.6–33)
MCHC RBC AUTO-ENTMCNC: 31.5 G/DL (ref 31.5–35.7)
MCV RBC AUTO: 98.4 FL (ref 79–97)
MONOCYTES # BLD AUTO: 0.93 10*3/MM3 (ref 0.1–0.9)
MONOCYTES NFR BLD AUTO: 9 % (ref 5–12)
NEUTROPHILS NFR BLD AUTO: 8.58 10*3/MM3 (ref 1.7–7)
NEUTROPHILS NFR BLD AUTO: 83.4 % (ref 42.7–76)
NITRITE UR QL STRIP: NEGATIVE
NRBC BLD AUTO-RTO: 0 /100 WBC (ref 0–0.2)
PH UR STRIP.AUTO: 5.5 [PH] (ref 5–8)
PLATELET # BLD AUTO: 177 10*3/MM3 (ref 140–450)
PMV BLD AUTO: 9.7 FL (ref 6–12)
POTASSIUM SERPL-SCNC: 4 MMOL/L (ref 3.5–5.2)
PROT SERPL-MCNC: 6.1 G/DL (ref 6–8.5)
PROT UR QL STRIP: ABNORMAL
RBC # BLD AUTO: 3.13 10*6/MM3 (ref 3.77–5.28)
RBC # UR STRIP: ABNORMAL /HPF
REF LAB TEST METHOD: ABNORMAL
SODIUM SERPL-SCNC: 138 MMOL/L (ref 136–145)
SP GR UR STRIP: 1.02 (ref 1–1.03)
SQUAMOUS #/AREA URNS HPF: ABNORMAL /HPF
UROBILINOGEN UR QL STRIP: ABNORMAL
WBC # UR STRIP: ABNORMAL /HPF
WBC NRBC COR # BLD AUTO: 10.28 10*3/MM3 (ref 3.4–10.8)
YEAST URNS QL MICRO: ABNORMAL /HPF

## 2024-03-03 PROCEDURE — 74176 CT ABD & PELVIS W/O CONTRAST: CPT

## 2024-03-03 PROCEDURE — G0378 HOSPITAL OBSERVATION PER HR: HCPCS

## 2024-03-03 PROCEDURE — 63710000001 HYDROCODONE-ACETAMINOPHEN 7.5-325 MG TABLET: Performed by: INTERNAL MEDICINE

## 2024-03-03 PROCEDURE — 83605 ASSAY OF LACTIC ACID: CPT | Performed by: INTERNAL MEDICINE

## 2024-03-03 PROCEDURE — 99285 EMERGENCY DEPT VISIT HI MDM: CPT

## 2024-03-03 PROCEDURE — 25010000002 ONDANSETRON PER 1 MG: Performed by: INTERNAL MEDICINE

## 2024-03-03 PROCEDURE — A9270 NON-COVERED ITEM OR SERVICE: HCPCS | Performed by: INTERNAL MEDICINE

## 2024-03-03 PROCEDURE — 25010000002 CEFTRIAXONE PER 250 MG: Performed by: EMERGENCY MEDICINE

## 2024-03-03 PROCEDURE — 82948 REAGENT STRIP/BLOOD GLUCOSE: CPT

## 2024-03-03 PROCEDURE — 63710000001 POLYCARBOPHIL 625 MG TABLET: Performed by: INTERNAL MEDICINE

## 2024-03-03 PROCEDURE — 25010000002 ONDANSETRON PER 1 MG: Performed by: EMERGENCY MEDICINE

## 2024-03-03 PROCEDURE — 63710000001 ROSUVASTATIN 20 MG TABLET: Performed by: INTERNAL MEDICINE

## 2024-03-03 PROCEDURE — P9612 CATHETERIZE FOR URINE SPEC: HCPCS

## 2024-03-03 PROCEDURE — 87040 BLOOD CULTURE FOR BACTERIA: CPT | Performed by: INTERNAL MEDICINE

## 2024-03-03 PROCEDURE — 63710000001 INSULIN LISPRO (HUMAN) PER 5 UNITS: Performed by: INTERNAL MEDICINE

## 2024-03-03 PROCEDURE — 85025 COMPLETE CBC W/AUTO DIFF WBC: CPT | Performed by: EMERGENCY MEDICINE

## 2024-03-03 PROCEDURE — 87086 URINE CULTURE/COLONY COUNT: CPT | Performed by: EMERGENCY MEDICINE

## 2024-03-03 PROCEDURE — 96374 THER/PROPH/DIAG INJ IV PUSH: CPT

## 2024-03-03 PROCEDURE — 25010000002 MORPHINE PER 10 MG: Performed by: EMERGENCY MEDICINE

## 2024-03-03 PROCEDURE — 96365 THER/PROPH/DIAG IV INF INIT: CPT

## 2024-03-03 PROCEDURE — 96375 TX/PRO/DX INJ NEW DRUG ADDON: CPT

## 2024-03-03 PROCEDURE — 81001 URINALYSIS AUTO W/SCOPE: CPT | Performed by: EMERGENCY MEDICINE

## 2024-03-03 PROCEDURE — 80053 COMPREHEN METABOLIC PANEL: CPT | Performed by: EMERGENCY MEDICINE

## 2024-03-03 PROCEDURE — 25010000002 HYDROMORPHONE 1 MG/ML SOLUTION: Performed by: EMERGENCY MEDICINE

## 2024-03-03 RX ORDER — ACETAMINOPHEN 650 MG/1
650 SUPPOSITORY RECTAL EVERY 4 HOURS PRN
Status: DISCONTINUED | OUTPATIENT
Start: 2024-03-03 | End: 2024-03-09 | Stop reason: HOSPADM

## 2024-03-03 RX ORDER — PANTOPRAZOLE SODIUM 40 MG/1
40 TABLET, DELAYED RELEASE ORAL
Status: DISCONTINUED | OUTPATIENT
Start: 2024-03-04 | End: 2024-03-09 | Stop reason: HOSPADM

## 2024-03-03 RX ORDER — POLYETHYLENE GLYCOL 3350 17 G/17G
17 POWDER, FOR SOLUTION ORAL DAILY PRN
Status: DISCONTINUED | OUTPATIENT
Start: 2024-03-03 | End: 2024-03-09 | Stop reason: HOSPADM

## 2024-03-03 RX ORDER — AMOXICILLIN 250 MG
2 CAPSULE ORAL 2 TIMES DAILY PRN
Status: DISCONTINUED | OUTPATIENT
Start: 2024-03-03 | End: 2024-03-09 | Stop reason: HOSPADM

## 2024-03-03 RX ORDER — CALCIUM POLYCARBOPHIL 625 MG
625 TABLET ORAL DAILY
Status: DISCONTINUED | OUTPATIENT
Start: 2024-03-03 | End: 2024-03-09 | Stop reason: HOSPADM

## 2024-03-03 RX ORDER — CARVEDILOL 25 MG/1
25 TABLET ORAL EVERY 12 HOURS SCHEDULED
Status: DISCONTINUED | OUTPATIENT
Start: 2024-03-03 | End: 2024-03-09 | Stop reason: HOSPADM

## 2024-03-03 RX ORDER — NICOTINE POLACRILEX 4 MG
15 LOZENGE BUCCAL
Status: DISCONTINUED | OUTPATIENT
Start: 2024-03-03 | End: 2024-03-09 | Stop reason: HOSPADM

## 2024-03-03 RX ORDER — MORPHINE SULFATE 2 MG/ML
2 INJECTION, SOLUTION INTRAMUSCULAR; INTRAVENOUS ONCE
Status: COMPLETED | OUTPATIENT
Start: 2024-03-03 | End: 2024-03-03

## 2024-03-03 RX ORDER — INSULIN LISPRO 100 [IU]/ML
2-7 INJECTION, SOLUTION INTRAVENOUS; SUBCUTANEOUS
Status: DISCONTINUED | OUTPATIENT
Start: 2024-03-03 | End: 2024-03-09 | Stop reason: HOSPADM

## 2024-03-03 RX ORDER — DEXTROSE MONOHYDRATE 25 G/50ML
25 INJECTION, SOLUTION INTRAVENOUS
Status: DISCONTINUED | OUTPATIENT
Start: 2024-03-03 | End: 2024-03-09 | Stop reason: HOSPADM

## 2024-03-03 RX ORDER — BISACODYL 5 MG/1
5 TABLET, DELAYED RELEASE ORAL DAILY PRN
Status: DISCONTINUED | OUTPATIENT
Start: 2024-03-03 | End: 2024-03-09 | Stop reason: HOSPADM

## 2024-03-03 RX ORDER — SEVELAMER CARBONATE 800 MG/1
800 TABLET, FILM COATED ORAL
Status: DISCONTINUED | OUTPATIENT
Start: 2024-03-04 | End: 2024-03-09 | Stop reason: HOSPADM

## 2024-03-03 RX ORDER — BUDESONIDE AND FORMOTEROL FUMARATE DIHYDRATE 160; 4.5 UG/1; UG/1
2 AEROSOL RESPIRATORY (INHALATION)
Status: DISCONTINUED | OUTPATIENT
Start: 2024-03-03 | End: 2024-03-09 | Stop reason: HOSPADM

## 2024-03-03 RX ORDER — ONDANSETRON 2 MG/ML
4 INJECTION INTRAMUSCULAR; INTRAVENOUS ONCE
Status: COMPLETED | OUTPATIENT
Start: 2024-03-03 | End: 2024-03-03

## 2024-03-03 RX ORDER — MORPHINE SULFATE 2 MG/ML
2 INJECTION, SOLUTION INTRAMUSCULAR; INTRAVENOUS ONCE
Status: DISCONTINUED | OUTPATIENT
Start: 2024-03-03 | End: 2024-03-03

## 2024-03-03 RX ORDER — MANNITOL 250 MG/ML
25 INJECTION, SOLUTION INTRAVENOUS AS NEEDED
Status: CANCELLED | OUTPATIENT
Start: 2024-03-04 | End: 2024-03-04

## 2024-03-03 RX ORDER — ALBUTEROL SULFATE 2.5 MG/3ML
2.5 SOLUTION RESPIRATORY (INHALATION) EVERY 6 HOURS PRN
Status: DISCONTINUED | OUTPATIENT
Start: 2024-03-03 | End: 2024-03-09 | Stop reason: HOSPADM

## 2024-03-03 RX ORDER — ONDANSETRON 2 MG/ML
4 INJECTION INTRAMUSCULAR; INTRAVENOUS EVERY 6 HOURS PRN
Status: DISCONTINUED | OUTPATIENT
Start: 2024-03-03 | End: 2024-03-09 | Stop reason: HOSPADM

## 2024-03-03 RX ORDER — ACETAMINOPHEN 325 MG/1
650 TABLET ORAL EVERY 4 HOURS PRN
Status: DISCONTINUED | OUTPATIENT
Start: 2024-03-03 | End: 2024-03-09 | Stop reason: HOSPADM

## 2024-03-03 RX ORDER — ROSUVASTATIN CALCIUM 20 MG/1
20 TABLET, COATED ORAL NIGHTLY
Status: DISCONTINUED | OUTPATIENT
Start: 2024-03-03 | End: 2024-03-09 | Stop reason: HOSPADM

## 2024-03-03 RX ORDER — BISACODYL 10 MG
10 SUPPOSITORY, RECTAL RECTAL DAILY PRN
Status: DISCONTINUED | OUTPATIENT
Start: 2024-03-03 | End: 2024-03-09 | Stop reason: HOSPADM

## 2024-03-03 RX ORDER — HYDROCODONE BITARTRATE AND ACETAMINOPHEN 7.5; 325 MG/1; MG/1
1 TABLET ORAL EVERY 6 HOURS PRN
Status: DISPENSED | OUTPATIENT
Start: 2024-03-03 | End: 2024-03-08

## 2024-03-03 RX ORDER — HYDRALAZINE HYDROCHLORIDE 25 MG/1
25 TABLET, FILM COATED ORAL EVERY 12 HOURS SCHEDULED
Status: DISCONTINUED | OUTPATIENT
Start: 2024-03-03 | End: 2024-03-09 | Stop reason: HOSPADM

## 2024-03-03 RX ORDER — CARVEDILOL 25 MG/1
25 TABLET ORAL EVERY 12 HOURS SCHEDULED
Status: DISCONTINUED | OUTPATIENT
Start: 2024-03-03 | End: 2024-03-03

## 2024-03-03 RX ORDER — ACETAMINOPHEN 160 MG/5ML
650 SOLUTION ORAL EVERY 4 HOURS PRN
Status: DISCONTINUED | OUTPATIENT
Start: 2024-03-03 | End: 2024-03-09 | Stop reason: HOSPADM

## 2024-03-03 RX ORDER — IBUPROFEN 600 MG/1
1 TABLET ORAL
Status: DISCONTINUED | OUTPATIENT
Start: 2024-03-03 | End: 2024-03-09 | Stop reason: HOSPADM

## 2024-03-03 RX ADMIN — ONDANSETRON 4 MG: 2 INJECTION INTRAMUSCULAR; INTRAVENOUS at 12:59

## 2024-03-03 RX ADMIN — CALCIUM POLYCARBOPHIL 625 MG: 625 TABLET, FILM COATED ORAL at 20:48

## 2024-03-03 RX ADMIN — CEFTRIAXONE SODIUM 1000 MG: 1 INJECTION, POWDER, FOR SOLUTION INTRAMUSCULAR; INTRAVENOUS at 12:56

## 2024-03-03 RX ADMIN — INSULIN LISPRO 2 UNITS: 100 INJECTION, SOLUTION INTRAVENOUS; SUBCUTANEOUS at 20:48

## 2024-03-03 RX ADMIN — ROSUVASTATIN CALCIUM 20 MG: 20 TABLET, FILM COATED ORAL at 20:48

## 2024-03-03 RX ADMIN — ONDANSETRON 4 MG: 2 INJECTION INTRAMUSCULAR; INTRAVENOUS at 17:46

## 2024-03-03 RX ADMIN — HYDROCODONE BITARTRATE AND ACETAMINOPHEN 1 TABLET: 7.5; 325 TABLET ORAL at 20:48

## 2024-03-03 RX ADMIN — HYDROMORPHONE HYDROCHLORIDE 0.5 MG: 1 INJECTION, SOLUTION INTRAMUSCULAR; INTRAVENOUS; SUBCUTANEOUS at 12:59

## 2024-03-03 RX ADMIN — MORPHINE SULFATE 2 MG: 2 INJECTION, SOLUTION INTRAMUSCULAR; INTRAVENOUS at 11:26

## 2024-03-03 NOTE — ED PROVIDER NOTES
Time: 10:46 AM EST  Date of encounter:  3/3/2024  Independent Historian/Clinical History and Information was obtained by:   Patient and Family    History is limited by: N/A    Chief Complaint: Right flank pain and right lower abdominal pain today          History of Present Illness:  Patient is a 89 y.o. year old female with ESRD on Monday Wednesday Friday dialysis who presents to the emergency department for evaluation of acute onset of right-sided flank pain rating down to the right side of the abdomen today associated with nausea.    She also had some burning with her morning urination but does not urinate much more than this daily due to renal failure.    She denies any fevers or chills.    No history of kidney stones or UTIs.  No bloody urine    HPI    Patient Care Team  Primary Care Provider: Rafael Lyons MD    Past Medical History:     No Known Allergies  Past Medical History:   Diagnosis Date    Allergic rhinitis     Anemia     Arthritis     Asthma     USE INHALERS AND NEBULIZERS    Back pain     Bladder disorder     Cancer     LEFT LUNG CANCER 2005 SURGERY AND CHEMO DONE  AND CURRENTLY 4/ 14/22  RIGHT LUNG CANCER HAS ONLY RECEIVED RADIATION THUS FAR    Chronic kidney disease     stage 3    CKD (chronic kidney disease) stage 4, GFR 15-29 ml/min     Condition not found     ulcer    Congestive heart failure (CHF)     FOLLOWED BY DR AQUINO. DENIES CP BUT DOES HAVE SOA CHRONIC ISSUE COPD/LUNG CANCER    COPD (chronic obstructive pulmonary disease)     FOLLOWED BY DR MARIAJOSE BAILEY    Coronary artery disease     DENIES CP BUT DOES GET SOA MOST OF THE TIME WITH EXERTION BUT OCC AT REST CHRONIC ISSUE COPD/LUNG CANCER    Deep vein thrombosis     Diabetes mellitus     DOES NOT CHECK BS DAILY    Disease of thyroid gland     HYPOTHYROIDISM    Essential hypertension     Gastric ulcer     GERD (gastroesophageal reflux disease)     Heart murmur     History of transfusion     NO ISSUES POST TRANSFUSION WAS MANY YEARS AGO     Hyperlipemia     Leukocytopenia     FOLLOWED BY DR MIR BAILEY    Limb swelling     Lumbago     Lumbar spinal stenosis     Lung cancer     Migraine headache     Multiple joint pain     On home oxygen therapy     3L/NC PRN    Osteopenia     Reflux esophagitis     Shortness of breath     Thyroid nodule     HAS ULTRASOUND YEARLY BEING MONITORED    Vascular disease     Vitamin D deficiency      Past Surgical History:   Procedure Laterality Date    ABDOMINAL SURGERY      APPENDECTOMY      ARTERIOVENOUS FISTULA/SHUNT SURGERY Left 05/10/2022    Procedure: Left basilic vein transposition;  Surgeon: Wan Barksdale MD;  Location: [facility] MAIN OR;  Service: Vascular;  Laterality: Left;    ARTERIOVENOUS FISTULA/SHUNT SURGERY Left 3/23/2023    Procedure: Ligation of left arm arteriovenous fistula;  Surgeon: Wan Barksdale MD;  Location: [facility] MAIN OR;  Service: Vascular;  Laterality: Left;    ARTERIOVENOUS FISTULA/SHUNT SURGERY Left 11/30/2023    Procedure: Creation of left arm arteriovenous graft;  Surgeon: Wan Barksdale MD;  Location: [facility] MAIN OR;  Service: Vascular;  Laterality: Left;    CARDIAC CATHETERIZATION  1996    CARDIAC SURGERY      CARDIAC SURGERY      fluid drained from heart    CATARACT EXTRACTION, BILATERAL  2003    COLONOSCOPY  2014    ENDOSCOPY  2016 2019    FEMORAL ARTERY STENT Bilateral     HYSTERECTOMY      LUNG BIOPSY Left 2005    lobectomy upper lung caner    LUNG VOLUME REDUCTION      OTHER SURGICAL HISTORY      artifical joints/limbs    REPLACEMENT TOTAL KNEE Left 2016    UPPER GASTROINTESTINAL ENDOSCOPY       Family History   Problem Relation Age of Onset    Arthritis Mother     Arthritis Father     Cancer Brother     Diabetes Brother     Other Brother         blood disease     Prostate cancer Brother     Cancer Brother     Prostate cancer Brother     Cancer Brother     Cancer Brother     Malig Hyperthermia Neg Hx     Colon cancer Neg Hx        Home Medications:  Prior to Admission medications     Medication Sig Start Date End Date Taking? Authorizing Provider   albuterol sulfate  (90 Base) MCG/ACT inhaler Inhale 2 puffs Every 4 (Four) Hours As Needed for Wheezing.    Sarah Parker MD   aspirin 81 MG chewable tablet Chew 1 tablet Daily.    Sarah Parker MD   bumetanide (BUMEX) 1 MG tablet Take 3 tablets by mouth Daily for 30 days. 12/6/23 1/5/24  Rafael Lyons MD   carvedilol (Coreg) 25 MG tablet Take 1 tablet by mouth 2 (Two) Times a Day With Meals for 30 days. 11/5/23 12/5/23  Patrick Turcios MD   cilostazol (PLETAL) 100 MG tablet Take 1 tablet by mouth 2 (two) times a day. 4/17/21   Sarah Parker MD   dexlansoprazole (DEXILANT) 60 MG capsule Take 1 capsule by mouth Daily. 9/23/22   Sarah Parker MD   ergocalciferol (ERGOCALCIFEROL) 1.25 MG (31502 UT) capsule Take 1 capsule by mouth Every 14 (Fourteen) Days. 3/4/22   Emergency, Nurse Epic, RN   hydrALAZINE (APRESOLINE) 25 MG tablet Take 1 tablet by mouth 2 (Two) Times a Day for 30 days. 11/5/23 12/5/23  Patrick Turcios MD   levocetirizine (XYZAL) 5 MG tablet TAKE 1 TABLET DAILY  Patient taking differently: Take 1 tablet by mouth Every Evening. 6/23/23   Paloma James MD   linagliptin (Tradjenta) 5 MG tablet tablet Take 1 tablet by mouth Daily. 1/10/22   Paloma James MD   nitroglycerin (NITROSTAT) 0.4 MG SL tablet Place 1 tablet under the tongue Every 5 (Five) Minutes As Needed for Chest Pain.    Sarah Parker MD   polycarbophil (calcium polycarbophil) 625 MG tablet tablet Take 1 tablet by mouth Daily.    Sarah Parker MD   rosuvastatin (CRESTOR) 20 MG tablet Take 1 tablet by mouth Daily. 7/7/23   León Sanchez MD   sevelamer (RENVELA) 800 MG tablet Take 1 tablet by mouth 3 (Three) Times a Day With Meals. 10/9/23   Sarah Parker MD   Symbicort 160-4.5 MCG/ACT inhaler Inhale 2 puffs 2 (two) times a day. 5/17/21   Provider, MD Sarah   traMADol (ULTRAM) 50 MG  "tablet Take 1 tablet by mouth Every 8 (Eight) Hours As Needed. 23   Provider, Sarah, MD        Social History:   Social History     Tobacco Use    Smoking status: Former     Current packs/day: 0.00     Average packs/day: 1 pack/day for 52.5 years (52.5 ttl pk-yrs)     Types: Cigarettes     Start date:      Quit date: 2004     Years since quittin.7    Smokeless tobacco: Never   Vaping Use    Vaping status: Never Used   Substance Use Topics    Alcohol use: Never    Drug use: Never         Review of Systems:  Review of Systems   I performed a 10 point review of systems which was all negative, except for the positives found in the HPI above.  Physical Exam:  /75   Pulse 84   Temp 97.8 °F (36.6 °C) (Oral)   Resp 18   Ht 165.1 cm (65\")   Wt 67.9 kg (149 lb 11.1 oz)   LMP  (LMP Unknown)   SpO2 99%   BMI 24.91 kg/m²     Physical Exam   General: Awake alert and in mild to moderate distress due to right flank pain    HEENT: Head normocephalic atraumatic, eyes PERRLA EOMI, nose normal, oropharynx normal.    Neck: Supple full range of motion, no meningismus, no lymphadenopathy    Heart: Regular rate and rhythm, no murmurs or rubs, 2+ radial pulses bilaterally    Lungs: Clear to auscultation bilaterally without wheezes or crackles, no respiratory distress    Abdomen: Soft, nontender, nondistended, no rebound or guarding    Back exam: She has some moderate right CVA tenderness to palpation but no rib crepitus or any visual abnormalities on inspection including no bruising or erythema or rash    Skin: Warm, dry, no rash    Musculoskeletal: Normal range of motion, no lower extremity edema    Neurologic: Oriented x3, no motor deficits no sensory deficits    Psychiatric: Mood appears stable, no psychosis          Procedures:  Procedures      Medical Decision Making:      Comorbidities that affect care:    Chronic Kidney Disease    External Notes reviewed:    Previous Labs: I compared her lab " work today to her baseline labs and she has chronic kidney disease and chronic anemia, unchanged today      The following orders were placed and all results were independently analyzed by me:  Orders Placed This Encounter   Procedures    Urine Culture - Urine,    CT Abdomen Pelvis Without Contrast    Comprehensive Metabolic Panel    Urinalysis With Culture If Indicated - Urine, Catheter    CBC Auto Differential    Urinalysis, Microscopic Only - Urine, Clean Catch    Straight cath    Urology (on-call MD unless specified)    IP General Consult (Use specialty-specific consult if known)    CBC & Differential       Medications Given in the Emergency Department:  Medications   morphine injection 2 mg (2 mg Intravenous Given 3/3/24 1126)   ondansetron (ZOFRAN) injection 4 mg (4 mg Intravenous Given 3/3/24 1259)   cefTRIAXone (ROCEPHIN) 1000 mg/100 mL 0.9% NS (MBP) (1,000 mg Intravenous New Bag 3/3/24 1256)   HYDROmorphone (DILAUDID) injection 0.5 mg (0.5 mg Intravenous Given 3/3/24 1259)        ED Course:         Labs:    Lab Results (last 24 hours)       Procedure Component Value Units Date/Time    CBC & Differential [[id number]]  (Abnormal) Collected: 03/03/24 0953    Specimen: Blood Updated: 03/03/24 1000    Narrative:      The following orders were created for panel order CBC & Differential.  Procedure                               Abnormality         Status                     ---------                               -----------         ------                     CBC Auto Differential[[id number]]        Abnormal            Final result                 Please view results for these tests on the individual orders.    Comprehensive Metabolic Panel [[id number]]  (Abnormal) Collected: 03/03/24 0953    Specimen: Blood Updated: 03/03/24 1032     Glucose 204 mg/dL      BUN 42 mg/dL      Creatinine 3.72 mg/dL      Sodium 138 mmol/L      Potassium 4.0 mmol/L      Chloride 101 mmol/L      CO2 26.6 mmol/L      Calcium 8.2 mg/dL       Total Protein 6.1 g/dL      Albumin 3.6 g/dL      ALT (SGPT) 9 U/L      AST (SGOT) 11 U/L      Alkaline Phosphatase 199 U/L      Total Bilirubin 0.2 mg/dL      Globulin 2.5 gm/dL      A/G Ratio 1.4 g/dL      BUN/Creatinine Ratio 11.3     Anion Gap 10.4 mmol/L      eGFR 11.3 mL/min/1.73      Comment: <15 Indicative of kidney failure       Narrative:      GFR Normal >60  Chronic Kidney Disease <60  Kidney Failure <15    The GFR formula is only valid for adults with stable renal function between ages 18 and 70.    CBC Auto Differential [[id number]]  (Abnormal) Collected: 03/03/24 0953    Specimen: Blood Updated: 03/03/24 1000     WBC 10.28 10*3/mm3      RBC 3.13 10*6/mm3      Hemoglobin 9.7 g/dL      Hematocrit 30.8 %      MCV 98.4 fL      MCH 31.0 pg      MCHC 31.5 g/dL      RDW 14.3 %      RDW-SD 51.2 fl      MPV 9.7 fL      Platelets 177 10*3/mm3      Neutrophil % 83.4 %      Lymphocyte % 5.4 %      Monocyte % 9.0 %      Eosinophil % 1.5 %      Basophil % 0.4 %      Immature Grans % 0.3 %      Neutrophils, Absolute 8.58 10*3/mm3      Lymphocytes, Absolute 0.55 10*3/mm3      Monocytes, Absolute 0.93 10*3/mm3      Eosinophils, Absolute 0.15 10*3/mm3      Basophils, Absolute 0.04 10*3/mm3      Immature Grans, Absolute 0.03 10*3/mm3      nRBC 0.0 /100 WBC     Urinalysis With Culture If Indicated - Straight Cath [[id number]]  (Abnormal) Collected: 03/03/24 1131    Specimen: Urine from Straight Cath Updated: 03/03/24 1147     Color, UA Yellow     Appearance, UA Turbid     pH, UA 5.5     Specific Gravity, UA 1.020     Glucose, UA Negative     Ketones, UA Trace     Bilirubin, UA Negative     Blood, UA Moderate (2+)     Protein, UA >=300 mg/dL (3+)     Leuk Esterase, UA Moderate (2+)     Nitrite, UA Negative     Urobilinogen, UA 1.0 E.U./dL    Narrative:      In absence of clinical symptoms, the presence of pyuria, bacteria, and/or nitrites on the urinalysis result does not correlate with infection.    Urinalysis, Microscopic  Only - Straight Cath [[id number]]  (Abnormal) Collected: 03/03/24 1131    Specimen: Urine from Straight Cath Updated: 03/03/24 1204     RBC, UA 0-2 /HPF      WBC, UA Too Numerous to Count /HPF      Bacteria, UA 2+ /HPF      Squamous Epithelial Cells, UA 31-50 /HPF      Yeast, UA Moderate/2+ Yeast /HPF      Hyaline Casts, UA None Seen /LPF      Methodology Manual Light Microscopy    Urine Culture - Urine, Straight Cath [[id number]] Collected: 03/03/24 1131    Specimen: Urine from Straight Cath Updated: 03/03/24 1204             Imaging:    CT Abdomen Pelvis Without Contrast    Result Date: 3/3/2024  PROCEDURE: CT ABDOMEN PELVIS WO CONTRAST  COMPARISON: [facility], CT, CT CHEST WO CONTRAST DIAGNOSTIC, 11/28/2023, 12:16.  INDICATIONS: RLQ AND RIGHT FLANK PAIN, NAUSEA, VOMTING, DIARRHEA  TECHNIQUE: CT images were created without intravenous contrast.   PROTOCOL:   Standard imaging protocol performed    RADIATION:   DLP: 409.3 mGy*cm   Automated exposure control was utilized to minimize radiation dose.  FINDINGS:    Lung bases:  Pleural thickening with calcification posteriorly and at the base of the left hemidiaphragm with scarring atelectasis in volume loss noted.  Right lung demonstrates minimal pleural thickening subpleural changes.  There is improved aeration with some persistent pleural thickening at the right base.  No free air is noted below the diaphragm.  Organs:  Increased subcapsular density and enlargement of the right kidney with associated perinephric stranding noted.  Findings suggest a mixed ages subcapsular hematoma measuring at least 3.1 cm in thickness in the mid and inferior portions with abnormality extending from the superior pole to the inferior pole.  Additional underlying high and low-density lesions likely high and low-density cysts are noted.  There is no definite hydronephrosis noted.  No obvious calculi are noted along the visualized or expected course of the ureters.  Several  high density lesions are noted within the left kidney compatible with proteinaceous or hemorrhagic cyst.  No hydronephrosis noted.  No calculi along the expected course of the left ureter.  Stones are noted layering dependently within the gallbladder.  No acute inflammatory changes associated with gallbladder noted.  The liver, spleen, pancreas and adrenal glands are grossly unremarkable in appearance  GI tract:  Postsurgical changes are noted at the GE junction.  There is a small hiatal hernia.  The stomach is otherwise grossly unremarkable in appearance on motion limited imaging.  Small bowel demonstrates no evidence of obstruction.  No suspicious mesenteric adenopathy or fluid collection is noted.  There is a heavy stool burden.  There is no acute abnormality appreciated of the colon.  Pelvis:  The bladder is decompressed and grossly unremarkable in appearance.  Ovaries are not well visualized.  Patient appears to be status post hysterectomy  Retroperitoneum:  Atherosclerotic changes are noted of the aorta which is normal in caliber.  There appear to be iliac stents bilaterally.  No suspicious retroperitoneal adenopathy  Bones and soft tissues:  Fat containing left inguinal hernia.  Multilevel degenerative changes noted of the spine        1. Increased density surrounding the right kidney compatible with a subcapsular hematoma.  There is no evidence of obstructive uropathy 2. There is a heavy stool burden.  GI tract demonstrates no definite focal acute inflammatory change 3. Cholelithiasis 4. Distal findings as above   Findings discussed with the ordering provider at the time interpretation    SISI VENEGAS MD       Electronically Signed and Approved By: SISI VENEGAS MD on 3/03/2024 at 11:49                Differential Diagnosis and Discussion:    Abdominal Pain: Based on the patient's signs and symptoms, I considered abdominal aortic aneurysm, small bowel obstruction, pancreatitis, acute cholecystitis,  acute appendecitis, peptic ulcer disease, gastritis, colitis, endocrine disorders, irritable bowel syndrome and other differential diagnosis an etiology of the patient's abdominal pain.  Flank Pain: Differential diagnosis includes but is not limited to kidney stones, pyelonephritis, musculoskeletal disorders, renal infarction, urinary tract infection, hydronephrosis, radiculopathy, aortic aneurysm, renal cell carcinoma.    All labs were reviewed and interpreted by me.  CT scan radiology impression was interpreted by me.    MDM     Amount and/or Complexity of Data Reviewed  Clinical lab tests: reviewed  Tests in the radiology section of CPT®: reviewed  Decide to obtain previous medical records or to obtain history from someone other than the patient: yes             This patient is a pleasant 89-year-old female that presents with acute onset of nontraumatic right flank pain starting just early this morning in bed.    She denies any recent falls or injuries to the right flank and looks like she might be passing a kidney stone today so I will give her some pain and nausea meds and check labs and urine and CT imaging of abdomen pelvis.    CT imaging demonstrates no obstructive uropathy but she does have right-sided kidney subcapsular hematoma and perinephric stranding.    I questioned her again about any recent injuries and she said she fell maybe 3 weeks ago but did not necessarily injure that area and has not been having any pain until early this morning there.    Urinalysis is contaminated with skin cells despite catheterization, but looks possible for UTI so I will initially give her a dose of IV ceftriaxone here and follow-up on culture.    I discussed plan of management with my urologist, who feels we need to transfer her as there is renal hematoma may require interventional radiology embolization and we do not have this available at our facility.    Patient is in stable condition at time of transfer to [facility]              Patient Care Considerations:          Consultants/Shared Management Plan:    Consultant: I discussed plan of management and transfer with Dr. Berumen of urology.  Transfer Provider: I have discussed the case with Dr. Harmon of [facility] hospitalist service and Dr. Neville of [facility] urology at [facility] who agrees to accept the patient as a transfer.    Social Determinants of Health:    Patient has presented with family members who are responsible, reliable and will ensure follow up care.      Disposition and Care Coordination:    Transferred: Through independent evaluation of the patient's history, physical, and imperical data, the patient meets criteria to be transferred to another hospital for evaluation/admission.        Final diagnoses:   Acute right flank pain   Hematoma of right kidney, initial encounter   Acute UTI (urinary tract infection)   ESRD on dialysis        ED Disposition       ED Disposition   Transfer to Another Facility     Condition   --    Comment   --               This medical record created using voice recognition software.             Wolf Dai MD  03/03/24 0042

## 2024-03-03 NOTE — CONSULTS
[facility] Consult Note      Patient Name: Charlette Rai  : 1935  MRN: [id number]  Primary Care Physician:  Rafael Lyons MD  Referring Physician: Wolf Dai MD  Date of admission: 3/3/2024    Subjective     Reason for Consult:  ESRD     HPI:   Charlette Rai is a 89 y.o. female with DM2, HTN, ESRD (HD MWF, [place], Dr García, LUE AVF) who presented to ER today with acute right flank pain with assoc nausea.  Produces scant urine but reports some dysuria.  No fever/chills.  No hx kidney stones.  CT abd/pelvis shows right subcapsular hematoma but no stones or hydronephrosis, also heavy stool burden and gallstones.  Hgb is 9.7 (though notably 10.7 to 10.9 past 4 weeks).  K/HCo3 normal and BUN/Cr 42/3.7.  Straight cath UA with mod blood and mod LE.  Given rocephin, zofran, morphine and dilaudid at outside ER  She is on baby aspirin but no anticoagulation.  She denies any recent falls or trauma.  Been on dialysis approx 4 mos.      Review of Systems:   14 point review of systems is otherwise negative except for mentioned above on HPI    Personal History     Past Medical History:   Diagnosis Date    Allergic rhinitis     Anemia     Arthritis     Asthma     USE INHALERS AND NEBULIZERS    Back pain     Bladder disorder     Cancer     LEFT LUNG CANCER  SURGERY AND CHEMO DONE  AND CURRENTLY   RIGHT LUNG CANCER HAS ONLY RECEIVED RADIATION THUS FAR    Chronic kidney disease     stage 3    CKD (chronic kidney disease) stage 4, GFR 15-29 ml/min     Condition not found     ulcer    Congestive heart failure (CHF)     FOLLOWED BY DR AQUINO. DENIES CP BUT DOES HAVE SOA CHRONIC ISSUE COPD/LUNG CANCER    COPD (chronic obstructive pulmonary disease)     FOLLOWED BY DR MARIAJOSE BAILEY    Coronary artery disease     DENIES CP BUT DOES GET SOA MOST OF THE TIME WITH EXERTION BUT OCC AT REST CHRONIC ISSUE COPD/LUNG CANCER    Deep vein thrombosis     Diabetes mellitus     DOES NOT CHECK BS DAILY     Disease of thyroid gland     HYPOTHYROIDISM    Essential hypertension     Gastric ulcer     GERD (gastroesophageal reflux disease)     Heart murmur     History of transfusion     NO ISSUES POST TRANSFUSION WAS MANY YEARS AGO    Hyperlipemia     Leukocytopenia     FOLLOWED BY DR MIR BAILEY    Limb swelling     Lumbago     Lumbar spinal stenosis     Lung cancer     Migraine headache     Multiple joint pain     On home oxygen therapy     3L/NC PRN    Osteopenia     Reflux esophagitis     Shortness of breath     Thyroid nodule     HAS ULTRASOUND YEARLY BEING MONITORED    Vascular disease     Vitamin D deficiency        Past Surgical History:   Procedure Laterality Date    ABDOMINAL SURGERY      APPENDECTOMY      ARTERIOVENOUS FISTULA/SHUNT SURGERY Left 05/10/2022    Procedure: Left basilic vein transposition;  Surgeon: Wan Barksdale MD;  Location: [facility] MAIN OR;  Service: Vascular;  Laterality: Left;    ARTERIOVENOUS FISTULA/SHUNT SURGERY Left 3/23/2023    Procedure: Ligation of left arm arteriovenous fistula;  Surgeon: Wan Barksdale MD;  Location: [facility] MAIN OR;  Service: Vascular;  Laterality: Left;    ARTERIOVENOUS FISTULA/SHUNT SURGERY Left 11/30/2023    Procedure: Creation of left arm arteriovenous graft;  Surgeon: Wan Barksdale MD;  Location: [facility] OR;  Service: Vascular;  Laterality: Left;    CARDIAC CATHETERIZATION  1996    CARDIAC SURGERY      CARDIAC SURGERY      fluid drained from heart    CATARACT EXTRACTION, BILATERAL  2003    COLONOSCOPY  2014    ENDOSCOPY  2016    2019    FEMORAL ARTERY STENT Bilateral     HYSTERECTOMY      LUNG BIOPSY Left 2005    lobectomy upper lung caner    LUNG VOLUME REDUCTION      OTHER SURGICAL HISTORY      artifical joints/limbs    REPLACEMENT TOTAL KNEE Left 2016    UPPER GASTROINTESTINAL ENDOSCOPY         Family History: family history includes Arthritis in her father and mother; Cancer in her brother, brother, brother, and brother; Diabetes in her brother; Other  in her brother; Prostate cancer in her brother and brother.    Social History:  reports that she quit smoking about 19 years ago. Her smoking use included cigarettes. She started smoking about 72 years ago. She has a 52.5 pack-year smoking history. She has never used smokeless tobacco. She reports that she does not drink alcohol and does not use drugs.    Home Medications:  Prior to Admission medications    Medication Sig Start Date End Date Taking? Authorizing Provider   albuterol sulfate  (90 Base) MCG/ACT inhaler Inhale 2 puffs Every 4 (Four) Hours As Needed for Wheezing.    ProviderSarah MD   aspirin 81 MG chewable tablet Chew 1 tablet Daily.    ProviderSarah MD   bumetanide (BUMEX) 1 MG tablet Take 3 tablets by mouth Daily for 30 days. 12/6/23 1/5/24  Rafael Lyons MD   carvedilol (Coreg) 25 MG tablet Take 1 tablet by mouth 2 (Two) Times a Day With Meals for 30 days. 11/5/23 12/5/23  Patrick Turcios MD   cilostazol (PLETAL) 100 MG tablet Take 1 tablet by mouth 2 (two) times a day. 4/17/21   Sarah Parker MD   dexlansoprazole (DEXILANT) 60 MG capsule Take 1 capsule by mouth Daily. 9/23/22   Sarah Parker MD   ergocalciferol (ERGOCALCIFEROL) 1.25 MG (51810 UT) capsule Take 1 capsule by mouth Every 14 (Fourteen) Days. 3/4/22   Emergency, Nurse Emy, RN   hydrALAZINE (APRESOLINE) 25 MG tablet Take 1 tablet by mouth 2 (Two) Times a Day for 30 days. 11/5/23 12/5/23  Patrick Turcios MD   levocetirizine (XYZAL) 5 MG tablet TAKE 1 TABLET DAILY  Patient taking differently: Take 1 tablet by mouth Every Evening. 6/23/23   Paloma James MD   linagliptin (Tradjenta) 5 MG tablet tablet Take 1 tablet by mouth Daily. 1/10/22   Paolma James MD   nitroglycerin (NITROSTAT) 0.4 MG SL tablet Place 1 tablet under the tongue Every 5 (Five) Minutes As Needed for Chest Pain.    Sarah Parker MD   polycarbophil (calcium polycarbophil) 625 MG tablet tablet Take 1  tablet by mouth Daily.    Sarah Parker MD   rosuvastatin (CRESTOR) 20 MG tablet Take 1 tablet by mouth Daily. 7/7/23   León Sanchez MD   sevelamer (RENVELA) 800 MG tablet Take 1 tablet by mouth 3 (Three) Times a Day With Meals. 10/9/23   Sarah Parker MD   Symbicort 160-4.5 MCG/ACT inhaler Inhale 2 puffs 2 (two) times a day. 5/17/21   Sarah Parker MD   traMADol (ULTRAM) 50 MG tablet Take 1 tablet by mouth Every 8 (Eight) Hours As Needed. 11/22/23   Sarah Parker MD       Allergies:  No Known Allergies    Objective     Vitals:   Temp:  [97.2 °F (36.2 °C)-97.8 °F (36.6 °C)] 97.2 °F (36.2 °C)  Heart Rate:  [79-84] 81  Resp:  [18] 18  BP: (113-161)/(47-75) 161/63  No intake or output data in the 24 hours ending 03/03/24 1714    Physical Exam:    General Appearance: pleasant elderly WF uncomfortable but no acute distress  Skin: warm and dry  HEENT: oral mucosa normal, nonicteric sclera  Neck: supple, no JVD  Lungs: CTA bilat no rales  Heart: RRR, normal S1 and S2  Abdomen: soft, RUE TTP   : right flank TTP  Extremities: no edema, cyanosis or clubbing, LUE AVF +T/B  Neuro: normal speech and mental status     Scheduled Meds:        IV Meds:        Results Reviewed:   I have personally reviewed the results from the time of this admission to 3/3/2024 17:14 EST     Lab Results   Component Value Date    GLUCOSE 204 (H) 03/03/2024    CALCIUM 8.2 (L) 03/03/2024     03/03/2024    K 4.0 03/03/2024    CO2 26.6 03/03/2024     03/03/2024    BUN 42 (H) 03/03/2024    CREATININE 3.72 (H) 03/03/2024    EGFRIFNONA 17 (L) 01/10/2022    BCR 11.3 03/03/2024    ANIONGAP 10.4 03/03/2024      Lab Results   Component Value Date    MG 1.9 11/30/2023    PHOS 3.7 12/02/2023    ALBUMIN 3.6 03/03/2024           Assessment / Plan     ASSESSMENT:  ESRD - IHD MWF, etiol likely diabetic nephropathy.  Has LUE AVF.  K/HCO3 and waste products stable.  Unsure of EDW but volume stable, with poor oral intake and  N/V in assoc with #2.    Spontaneous right subcapsular hematoma - etiol unclear, atraumatic.  No assoc hydronephrosis or stones.  UROL to see, but imagine conservative mgmt the approach ie pain control, anti-emetic, etc.  On asa 81 mg but no systolic AC  UTI - produces scant urine but did have dysuria and straight cath UA with large blood/LE - given rocephin at OSH ER; afebrile with WBC 10K  HTN - BP bit high 160 systolic, likely driven by pain, on coreg, hydralazine  Anemia of CKD +/- ABL (#2) - hgb is 9.7, been high 10s at dialysis unit past month  DM2, mgmt per primary team,   MBD, on renvela for phos binder  GERD, on PPI therapy     PLAN:  HD tomorrow   Pain mgmt and anti-emetics   Given rocephin at OSH, cont'd abx per primary team  UROL to see   Hold bumex as has scant residual fcn   Resume home antihypertensive regimen   Monitor H&H    Thank you for involving us in the care of Charlette Rai.  Please feel free to call with any questions.    Dakota Morgan MD  03/03/24  17:14 [facility]    [facility]  [phone]

## 2024-03-04 PROBLEM — N18.6 ESRD (END STAGE RENAL DISEASE): Status: ACTIVE | Noted: 2024-03-04

## 2024-03-04 LAB
ALBUMIN SERPL-MCNC: 3.2 G/DL (ref 3.5–5.2)
ANION GAP SERPL CALCULATED.3IONS-SCNC: 12 MMOL/L (ref 5–15)
BACTERIA SPEC AEROBE CULT: NO GROWTH
BASOPHILS # BLD AUTO: 0.02 10*3/MM3 (ref 0–0.2)
BASOPHILS NFR BLD AUTO: 0.3 % (ref 0–1.5)
BUN SERPL-MCNC: 49 MG/DL (ref 8–23)
BUN/CREAT SERPL: 10.8 (ref 7–25)
CALCIUM SPEC-SCNC: 8.1 MG/DL (ref 8.6–10.5)
CHLORIDE SERPL-SCNC: 101 MMOL/L (ref 98–107)
CO2 SERPL-SCNC: 25 MMOL/L (ref 22–29)
CREAT SERPL-MCNC: 4.54 MG/DL (ref 0.57–1)
DEPRECATED RDW RBC AUTO: 48.3 FL (ref 37–54)
EGFRCR SERPLBLD CKD-EPI 2021: 8.9 ML/MIN/1.73
EOSINOPHIL # BLD AUTO: 0.18 10*3/MM3 (ref 0–0.4)
EOSINOPHIL NFR BLD AUTO: 2.5 % (ref 0.3–6.2)
ERYTHROCYTE [DISTWIDTH] IN BLOOD BY AUTOMATED COUNT: 13.6 % (ref 12.3–15.4)
GLUCOSE BLDC GLUCOMTR-MCNC: 114 MG/DL (ref 70–130)
GLUCOSE BLDC GLUCOMTR-MCNC: 147 MG/DL (ref 70–130)
GLUCOSE BLDC GLUCOMTR-MCNC: 165 MG/DL (ref 70–130)
GLUCOSE BLDC GLUCOMTR-MCNC: 97 MG/DL (ref 70–130)
GLUCOSE SERPL-MCNC: 116 MG/DL (ref 65–99)
HCT VFR BLD AUTO: 27.8 % (ref 34–46.6)
HGB BLD-MCNC: 8.7 G/DL (ref 12–15.9)
IMM GRANULOCYTES # BLD AUTO: 0.01 10*3/MM3 (ref 0–0.05)
IMM GRANULOCYTES NFR BLD AUTO: 0.1 % (ref 0–0.5)
LYMPHOCYTES # BLD AUTO: 1.01 10*3/MM3 (ref 0.7–3.1)
LYMPHOCYTES NFR BLD AUTO: 14.2 % (ref 19.6–45.3)
MCH RBC QN AUTO: 30.6 PG (ref 26.6–33)
MCHC RBC AUTO-ENTMCNC: 31.3 G/DL (ref 31.5–35.7)
MCV RBC AUTO: 97.9 FL (ref 79–97)
MONOCYTES # BLD AUTO: 1.08 10*3/MM3 (ref 0.1–0.9)
MONOCYTES NFR BLD AUTO: 15.2 % (ref 5–12)
NEUTROPHILS NFR BLD AUTO: 4.79 10*3/MM3 (ref 1.7–7)
NEUTROPHILS NFR BLD AUTO: 67.7 % (ref 42.7–76)
NRBC BLD AUTO-RTO: 0 /100 WBC (ref 0–0.2)
PHOSPHATE SERPL-MCNC: 4.3 MG/DL (ref 2.5–4.5)
PLATELET # BLD AUTO: 164 10*3/MM3 (ref 140–450)
PMV BLD AUTO: 9.6 FL (ref 6–12)
POTASSIUM SERPL-SCNC: 4.5 MMOL/L (ref 3.5–5.2)
RBC # BLD AUTO: 2.84 10*6/MM3 (ref 3.77–5.28)
SODIUM SERPL-SCNC: 138 MMOL/L (ref 136–145)
WBC NRBC COR # BLD AUTO: 7.09 10*3/MM3 (ref 3.4–10.8)

## 2024-03-04 PROCEDURE — 63710000001 ACETAMINOPHEN 325 MG TABLET: Performed by: INTERNAL MEDICINE

## 2024-03-04 PROCEDURE — G0378 HOSPITAL OBSERVATION PER HR: HCPCS

## 2024-03-04 PROCEDURE — A9270 NON-COVERED ITEM OR SERVICE: HCPCS | Performed by: INTERNAL MEDICINE

## 2024-03-04 PROCEDURE — 63710000001 SEVELAMER 800 MG TABLET: Performed by: INTERNAL MEDICINE

## 2024-03-04 PROCEDURE — 94640 AIRWAY INHALATION TREATMENT: CPT

## 2024-03-04 PROCEDURE — 63710000001 HYDROCODONE-ACETAMINOPHEN 7.5-325 MG TABLET: Performed by: INTERNAL MEDICINE

## 2024-03-04 PROCEDURE — 94760 N-INVAS EAR/PLS OXIMETRY 1: CPT

## 2024-03-04 PROCEDURE — 63710000001 BUDESONIDE-FORMOTEROL 160-4.5 MCG/ACT AEROSOL 6 G INHALER: Performed by: INTERNAL MEDICINE

## 2024-03-04 PROCEDURE — 85025 COMPLETE CBC W/AUTO DIFF WBC: CPT | Performed by: INTERNAL MEDICINE

## 2024-03-04 PROCEDURE — 63710000001 CARVEDILOL 25 MG TABLET: Performed by: INTERNAL MEDICINE

## 2024-03-04 PROCEDURE — 25010000002 HYDROMORPHONE PER 4 MG: Performed by: NURSE PRACTITIONER

## 2024-03-04 PROCEDURE — 63710000001 INSULIN LISPRO (HUMAN) PER 5 UNITS: Performed by: INTERNAL MEDICINE

## 2024-03-04 PROCEDURE — 25010000002 CEFTRIAXONE PER 250 MG: Performed by: INTERNAL MEDICINE

## 2024-03-04 PROCEDURE — 94799 UNLISTED PULMONARY SVC/PX: CPT

## 2024-03-04 PROCEDURE — 80069 RENAL FUNCTION PANEL: CPT | Performed by: INTERNAL MEDICINE

## 2024-03-04 PROCEDURE — 63710000001 ROSUVASTATIN 20 MG TABLET: Performed by: INTERNAL MEDICINE

## 2024-03-04 PROCEDURE — G0257 UNSCHED DIALYSIS ESRD PT HOS: HCPCS

## 2024-03-04 PROCEDURE — 94761 N-INVAS EAR/PLS OXIMETRY MLT: CPT

## 2024-03-04 PROCEDURE — 96375 TX/PRO/DX INJ NEW DRUG ADDON: CPT

## 2024-03-04 PROCEDURE — 63710000001 PANTOPRAZOLE 40 MG TABLET DELAYED-RELEASE: Performed by: INTERNAL MEDICINE

## 2024-03-04 PROCEDURE — 63710000001 LIDOCAINE 4 % CREAM 5 G TUBE: Performed by: INTERNAL MEDICINE

## 2024-03-04 PROCEDURE — 82948 REAGENT STRIP/BLOOD GLUCOSE: CPT

## 2024-03-04 PROCEDURE — 63710000001 POLYCARBOPHIL 625 MG TABLET: Performed by: INTERNAL MEDICINE

## 2024-03-04 RX ORDER — LIDOCAINE 40 MG/G
1 CREAM TOPICAL AS NEEDED
Status: DISCONTINUED | OUTPATIENT
Start: 2024-03-04 | End: 2024-03-09 | Stop reason: HOSPADM

## 2024-03-04 RX ORDER — LIDOCAINE AND PRILOCAINE 25; 25 MG/G; MG/G
1 CREAM TOPICAL ONCE
Status: CANCELLED | OUTPATIENT
Start: 2024-03-04

## 2024-03-04 RX ORDER — HYDROMORPHONE HYDROCHLORIDE 1 MG/ML
0.25 INJECTION, SOLUTION INTRAMUSCULAR; INTRAVENOUS; SUBCUTANEOUS ONCE
Status: COMPLETED | OUTPATIENT
Start: 2024-03-04 | End: 2024-03-04

## 2024-03-04 RX ADMIN — CEFTRIAXONE SODIUM 1000 MG: 1 INJECTION, POWDER, FOR SOLUTION INTRAMUSCULAR; INTRAVENOUS at 11:51

## 2024-03-04 RX ADMIN — SEVELAMER CARBONATE 800 MG: 800 TABLET, FILM COATED ORAL at 11:48

## 2024-03-04 RX ADMIN — SEVELAMER CARBONATE 800 MG: 800 TABLET, FILM COATED ORAL at 08:38

## 2024-03-04 RX ADMIN — INSULIN LISPRO 2 UNITS: 100 INJECTION, SOLUTION INTRAVENOUS; SUBCUTANEOUS at 23:10

## 2024-03-04 RX ADMIN — BUDESONIDE AND FORMOTEROL FUMARATE DIHYDRATE 2 PUFF: 160; 4.5 AEROSOL RESPIRATORY (INHALATION) at 07:43

## 2024-03-04 RX ADMIN — ROSUVASTATIN CALCIUM 20 MG: 20 TABLET, FILM COATED ORAL at 20:19

## 2024-03-04 RX ADMIN — HYDROMORPHONE HYDROCHLORIDE 0.25 MG: 1 INJECTION, SOLUTION INTRAMUSCULAR; INTRAVENOUS; SUBCUTANEOUS at 00:06

## 2024-03-04 RX ADMIN — CARVEDILOL 25 MG: 25 TABLET, FILM COATED ORAL at 08:38

## 2024-03-04 RX ADMIN — LIDOCAINE 4% 1 APPLICATION: 4 CREAM TOPICAL at 11:59

## 2024-03-04 RX ADMIN — ACETAMINOPHEN 325MG 650 MG: 325 TABLET ORAL at 01:30

## 2024-03-04 RX ADMIN — PANTOPRAZOLE SODIUM 40 MG: 40 TABLET, DELAYED RELEASE ORAL at 06:31

## 2024-03-04 RX ADMIN — HYDROCODONE BITARTRATE AND ACETAMINOPHEN 1 TABLET: 7.5; 325 TABLET ORAL at 20:20

## 2024-03-04 RX ADMIN — CALCIUM POLYCARBOPHIL 625 MG: 625 TABLET, FILM COATED ORAL at 08:38

## 2024-03-04 RX ADMIN — HYDROCODONE BITARTRATE AND ACETAMINOPHEN 1 TABLET: 7.5; 325 TABLET ORAL at 13:18

## 2024-03-04 RX ADMIN — BUDESONIDE AND FORMOTEROL FUMARATE DIHYDRATE 2 PUFF: 160; 4.5 AEROSOL RESPIRATORY (INHALATION) at 19:38

## 2024-03-04 NOTE — NURSING NOTE
hd without incident or c/o. tolerated well. removed 2 L . no meds administered. avf needles removed x 2. hemostasis achieved. stable, no c/o post completion of hd.

## 2024-03-04 NOTE — PROGRESS NOTES
Name: Charlette Rai ADMIT: 3/3/2024   : 1935  PCP: Rafael Lyons MD    MRN: [id number] LOS: 0 days   AGE/SEX: 89 y.o. female  ROOM: [facility]     Subjective   Subjective   Pleasant patient, resting in bed.  She continues to complain of some abdominal soreness.  She tells me that she has been seen by urology and nephrology this morning and they are planning a trial of conservative management.  She has no other questions at this time.       Objective   Objective   Vital Signs  Temp:  [97.1 °F (36.2 °C)-98 °F (36.7 °C)] 97.5 °F (36.4 °C)  Heart Rate:  [52-83] 83  Resp:  [14-16] 16  BP: (119-157)/(58-70) 155/67  SpO2:  [91 %-100 %] 98 %  on   ;   Device (Oxygen Therapy): nasal cannula  There is no height or weight on file to calculate BMI.  Physical Exam  Constitutional:       General: She is not in acute distress.     Appearance: She is ill-appearing (chronically).   Cardiovascular:      Rate and Rhythm: Normal rate and regular rhythm.   Pulmonary:      Effort: Pulmonary effort is normal. No respiratory distress.      Breath sounds: Normal breath sounds. No wheezing.   Abdominal:      General: Abdomen is flat. Bowel sounds are normal.      Palpations: Abdomen is soft.      Tenderness: There is abdominal tenderness.   Musculoskeletal:         General: No swelling or tenderness.      Right lower leg: No edema.      Left lower leg: No edema.      Comments: LUE AVF   Skin:     General: Skin is warm and dry.   Neurological:      General: No focal deficit present.      Mental Status: She is alert and oriented to person, place, and time. Mental status is at baseline.         Results Review     I reviewed the patient's new clinical results.  Results from last 7 days   Lab Units 24  0511 24  0953   WBC 10*3/mm3 7.09 10.28   HEMOGLOBIN g/dL 8.7* 9.7*   PLATELETS 10*3/mm3 164 177     Results from last 7 days   Lab Units 24  0511 24  0953   SODIUM mmol/L 138 138   POTASSIUM mmol/L 4.5 4.0    CHLORIDE mmol/L 101 101   CO2 mmol/L 25.0 26.6   BUN mg/dL 49* 42*   CREATININE mg/dL 4.54* 3.72*   GLUCOSE mg/dL 116* 204*   Estimated Creatinine Clearance: 9.4 mL/min (A) (by C-G formula based on SCr of 4.54 mg/dL (H)).  Results from last 7 days   Lab Units 03/04/24  0511 03/03/24  0953   ALBUMIN g/dL 3.2* 3.6   BILIRUBIN mg/dL  --  0.2   ALK PHOS U/L  --  199*   AST (SGOT) U/L  --  11   ALT (SGPT) U/L  --  9     Results from last 7 days   Lab Units 03/04/24  0511 03/03/24  0953   CALCIUM mg/dL 8.1* 8.2*   ALBUMIN g/dL 3.2* 3.6   PHOSPHORUS mg/dL 4.3  --      Results from last 7 days   Lab Units 03/03/24 2027   LACTATE mmol/L 1.4     COVID19   Date Value Ref Range Status   11/09/2023 Not Detected Not Detected - Ref. Range Final   10/29/2023 Not Detected Not Detected - Ref. Range Final     Glucose   Date/Time Value Ref Range Status   03/04/2024 1105 147 (H) 70 - 130 mg/dL Final   03/04/2024 0606 97 70 - 130 mg/dL Final   03/03/2024 2024 174 (H) 70 - 130 mg/dL Final       CT Abdomen Pelvis Without Contrast  Narrative: PROCEDURE: CT ABDOMEN PELVIS WO CONTRAST     COMPARISON: [facility], CT, CT CHEST WO CONTRAST DIAGNOSTIC, 11/28/2023, 12:16.     INDICATIONS: RLQ AND RIGHT FLANK PAIN, NAUSEA, VOMTING, DIARRHEA     TECHNIQUE: CT images were created without intravenous contrast.       PROTOCOL:   Standard imaging protocol performed      RADIATION:   DLP: 409.3 mGy*cm    Automated exposure control was utilized to minimize radiation dose.      FINDINGS:         Lung bases:  Pleural thickening with calcification posteriorly and at the base of the left   hemidiaphragm with scarring atelectasis in volume loss noted.  Right lung demonstrates minimal   pleural thickening subpleural changes.  There is improved aeration with some persistent pleural   thickening at the right base.     No free air is noted below the diaphragm.     Organs:  Increased subcapsular density and enlargement of the right kidney with  associated   perinephric stranding noted.  Findings suggest a mixed ages subcapsular hematoma measuring at least   3.1 cm in thickness in the mid and inferior portions with abnormality extending from the superior   pole to the inferior pole.  Additional underlying high and low-density lesions likely high and   low-density cysts are noted.  There is no definite hydronephrosis noted.  No obvious calculi are   noted along the visualized or expected course of the ureters.  Several high density lesions are   noted within the left kidney compatible with proteinaceous or hemorrhagic cyst.  No hydronephrosis   noted.  No calculi along the expected course of the left ureter.     Stones are noted layering dependently within the gallbladder.  No acute inflammatory changes   associated with gallbladder noted.  The liver, spleen, pancreas and adrenal glands are grossly   unremarkable in appearance     GI tract:  Postsurgical changes are noted at the GE junction.  There is a small hiatal hernia.  The   stomach is otherwise grossly unremarkable in appearance on motion limited imaging.  Small bowel   demonstrates no evidence of obstruction.  No suspicious mesenteric adenopathy or fluid collection   is noted.  There is a heavy stool burden.  There is no acute abnormality appreciated of the colon.     Pelvis:  The bladder is decompressed and grossly unremarkable in appearance.  Ovaries are not well   visualized.  Patient appears to be status post hysterectomy     Retroperitoneum:  Atherosclerotic changes are noted of the aorta which is normal in caliber.  There   appear to be iliac stents bilaterally.  No suspicious retroperitoneal adenopathy     Bones and soft tissues:  Fat containing left inguinal hernia.  Multilevel degenerative changes   noted of the spine     Impression:    1. Increased density surrounding the right kidney compatible with a subcapsular hematoma.  There is   no evidence of obstructive uropathy  2. There is a  heavy stool burden.  GI tract demonstrates no definite focal acute inflammatory   change  3. Cholelithiasis  4. Distal findings as above        Findings discussed with the ordering provider at the time interpretation          SISI VENEGAS MD         Electronically Signed and Approved By: SISI VENEGAS MD on 3/03/2024 at 11:49                     Scheduled Medications  budesonide-formoterol, 2 puff, Inhalation, BID - RT  carvedilol, 25 mg, Oral, Q12H  cefTRIAXone, 1,000 mg, Intravenous, Q24H  hydrALAZINE, 25 mg, Oral, Q12H  insulin lispro, 2-7 Units, Subcutaneous, 4x Daily AC & at Bedtime  pantoprazole, 40 mg, Oral, BID AC  polycarbophil, 625 mg, Oral, Daily  rosuvastatin, 20 mg, Oral, Nightly  sevelamer, 800 mg, Oral, TID With Meals    Infusions   Diet  Diet: Regular/House; Fluid Consistency: Thin (IDDSI 0)         I have personally reviewed:  [x]  Laboratory   []  Microbiology   [x]  Radiology   [x]  EKG/Telemetry   []  Cardiology/Vascular   []  Pathology   []  Records    Assessment/Plan     Active Hospital Problems    Diagnosis  POA    **Hematoma [T14.8XXA]  Yes    ESRD (end stage renal disease) [N18.6]  Unknown    Essential hypertension [I10]  Yes    Type 2 diabetes mellitus [E11.9]  Unknown      Resolved Hospital Problems   No resolved problems to display.       89 y.o. female admitted with Hematoma.    Subcapsular hematoma, right  -Plan for conservative management with serial imaging in the next 1 to 2 days  -Hemoglobin trend, urology hopeful to avoid IR embolization      ESRD on HD  Anemia of chronic disease  Metabolic bone disease  -Nephrology consulted, patient to receive HD today  -Continue Phos binders    Type 2 diabetes  -Continue sliding scale insulin; adjust as needed  -A1c 10/23 7.8%    Hypertension  -Continue Coreg, hydralazine    SCDs for DVT prophylaxis.  Full code.  Discussed with patient and nursing staff.  Anticipate discharge  tbd  timing yet to be determined.      Emily Chandra,  MD [facility]  03/04/24  16:17 EST    I wore protective equipment throughout this patient encounter including gloves.  Hand hygiene was performed before donning protective equipment and after removal when leaving the room.    Expected Discharge Date and Time       Expected Discharge Date Expected Discharge Time    Mar 6, 2024              Copied text in this note has been reviewed and is accurate as of 03/04/24.

## 2024-03-04 NOTE — CONSULTS
[facility] Progress Note      Patient Name: Charlette Rai  : 1935  MRN: [id number]  Primary Care Physician:  Rafael Lyons MD  Date of admission: 3/3/2024    Subjective     Interval History:   Follow-up end-stage renal disease    Patient is feeling somewhat better her right flank pain has improved, she was seen by urology today, and she will be observed for her subcapsular right kidney hematoma    Review of Systems:   As noted above    Objective     Vitals:   Temp:  [97.1 °F (36.2 °C)-98 °F (36.7 °C)] 97.1 °F (36.2 °C)  Heart Rate:  [52-84] 52  Resp:  [14-18] 14  BP: (119-161)/(58-70) 119/62  No intake or output data in the 24 hours ending 24 1108    Physical Exam:    General Appearance: Awake, alert, chronically ill, no acute  Skin: warm and dry  HEENT: oral mucosa normal, nonicteric sclera  Neck: supple, no JVD  Lungs: CTA, unlabored breathing effort  Heart: RRR, normal S1 and S2, no rub  Abdomen: soft, nontender, nondistended  : no palpable bladder, she had mild right CVA tenderness  Extremities: no edema, cyanosis or clubbing, functional AV graft in the left upper arm  Neuro: normal speech and mental status with good thrill and bruit    Scheduled Meds:     budesonide-formoterol, 2 puff, Inhalation, BID - RT  carvedilol, 25 mg, Oral, Q12H  cefTRIAXone, 1,000 mg, Intravenous, Q24H  hydrALAZINE, 25 mg, Oral, Q12H  insulin lispro, 2-7 Units, Subcutaneous, 4x Daily AC & at Bedtime  pantoprazole, 40 mg, Oral, BID AC  polycarbophil, 625 mg, Oral, Daily  rosuvastatin, 20 mg, Oral, Nightly  sevelamer, 800 mg, Oral, TID With Meals      IV Meds:        Results Reviewed:   I have personally reviewed the results from the time of this admission to 3/4/2024 11:08 EST     Results from last 7 days   Lab Units 24  0511 24  0953   SODIUM mmol/L 138 138   POTASSIUM mmol/L 4.5 4.0   CHLORIDE mmol/L 101 101   CO2 mmol/L 25.0 26.6   BUN mg/dL 49* 42*   CREATININE mg/dL 4.54*  3.72*   CALCIUM mg/dL 8.1* 8.2*   BILIRUBIN mg/dL  --  0.2   ALK PHOS U/L  --  199*   ALT (SGPT) U/L  --  9   AST (SGOT) U/L  --  11   GLUCOSE mg/dL 116* 204*       Estimated Creatinine Clearance: 9.4 mL/min (A) (by C-G formula based on SCr of 4.54 mg/dL (H)).    Results from last 7 days   Lab Units 03/04/24  0511   PHOSPHORUS mg/dL 4.3             Results from last 7 days   Lab Units 03/04/24  0511 03/03/24  0953   WBC 10*3/mm3 7.09 10.28   HEMOGLOBIN g/dL 8.7* 9.7*   PLATELETS 10*3/mm3 164 177             Assessment / Plan     ASSESSMENT:  End-stage renal disease on maintenance hemodialysis every Monday, Wednesday and Friday under the care of my partner Dr. García in [place], she appears to be euvolemic, her electrolyte within acceptable range.  Diabetes mellitus type 2 with renal complication  Hypertension with chronic kidney disease reasonably controlled  Spontaneous right subcapsular renal hematoma etiology not very clear evaluated by urology and will be monitored for now.  No indication for for IR embolization at this time that we will continue to be as an option if there is any worsening in the bleed.  Anemia of chronic kidney disease hemoglobin slightly lower down to 8.7, will continue to monitor  Metabolic bone disease treated with phosphate binder    PLAN:  Hemodialysis today  Continue the same treatment  Surveillance labs      I reviewed the chart and other providers notes, reviewed labs.  I discussed the case with the patient and she voiced good understanding  Copied text in this note has been reviewed and is accurate as of 03/04/24.       Thank you for involving us in the care of Charlette Rai.  Please feel free to call with any questions.    Matt Menendez MD  03/04/24  11:08 [facility]    [facility]  [phone]    Please note that portions of this note were completed with a voice recognition program.

## 2024-03-04 NOTE — CONSULTS
FIRST UROLOGY CONSULT      Patient Identification:  NAME:  Charlette Rai  Age:  89 y.o.   Sex:  female   :  1935   MRN:  [id number]       Chief complaint: Right perinephric hematoma     History of present illness:  Charlette Rai is a 89 y.o. woman, transferred from [facility] with findings of Right perinephric hematoma. ESRD on Dialysis. Having some right-side soreness. Very pleasant woman. Some mild dysuria.     Cr 4.54  WBC 7.09  Hct 27.8        CT 3/3/2024  1. Increased density surrounding the right kidney compatible with a subcapsular hematoma.  There is   no evidence of obstructive uropathy  2. There is a heavy stool burden.  GI tract demonstrates no definite focal acute inflammatory   change  3. Cholelithiasis  4. Distal findings as above     I personally viewed the available imaging including most recent imaging and made an independent assessment.    Past medical history:  Past Medical History:   Diagnosis Date    Allergic rhinitis     Anemia     Arthritis     Asthma     USE INHALERS AND NEBULIZERS    Back pain     Bladder disorder     Cancer     LEFT LUNG CANCER  SURGERY AND CHEMO DONE  AND CURRENTLY   RIGHT LUNG CANCER HAS ONLY RECEIVED RADIATION THUS FAR    Chronic kidney disease     stage 3    CKD (chronic kidney disease) stage 4, GFR 15-29 ml/min     Condition not found     ulcer    Congestive heart failure (CHF)     FOLLOWED BY DR AQUINO. DENIES CP BUT DOES HAVE SOA CHRONIC ISSUE COPD/LUNG CANCER    COPD (chronic obstructive pulmonary disease)     FOLLOWED BY DR MARIAJOSE BAILEY    Coronary artery disease     DENIES CP BUT DOES GET SOA MOST OF THE TIME WITH EXERTION BUT OCC AT REST CHRONIC ISSUE COPD/LUNG CANCER    Deep vein thrombosis     Diabetes mellitus     DOES NOT CHECK BS DAILY    Disease of thyroid gland     HYPOTHYROIDISM    Essential hypertension     Gastric ulcer     GERD (gastroesophageal reflux disease)     Heart murmur     History of transfusion     NO ISSUES POST  TRANSFUSION WAS MANY YEARS AGO    Hyperlipemia     Leukocytopenia     FOLLOWED BY DR MIR BAILEY    Limb swelling     Lumbago     Lumbar spinal stenosis     Lung cancer     Migraine headache     Multiple joint pain     On home oxygen therapy     3L/NC PRN    Osteopenia     Reflux esophagitis     Shortness of breath     Thyroid nodule     HAS ULTRASOUND YEARLY BEING MONITORED    Vascular disease     Vitamin D deficiency        Past surgical history:  Past Surgical History:   Procedure Laterality Date    ABDOMINAL SURGERY      APPENDECTOMY      ARTERIOVENOUS FISTULA/SHUNT SURGERY Left 05/10/2022    Procedure: Left basilic vein transposition;  Surgeon: Wan Barksdale MD;  Location: [facility] MAIN OR;  Service: Vascular;  Laterality: Left;    ARTERIOVENOUS FISTULA/SHUNT SURGERY Left 3/23/2023    Procedure: Ligation of left arm arteriovenous fistula;  Surgeon: Wan Barksdale MD;  Location: [facility] MAIN OR;  Service: Vascular;  Laterality: Left;    ARTERIOVENOUS FISTULA/SHUNT SURGERY Left 11/30/2023    Procedure: Creation of left arm arteriovenous graft;  Surgeon: Wan Barksdale MD;  Location: [facility] MAIN OR;  Service: Vascular;  Laterality: Left;    CARDIAC CATHETERIZATION  1996    CARDIAC SURGERY      CARDIAC SURGERY      fluid drained from heart    CATARACT EXTRACTION, BILATERAL  2003    COLONOSCOPY  2014    ENDOSCOPY  2016    2019    FEMORAL ARTERY STENT Bilateral     HYSTERECTOMY      LUNG BIOPSY Left 2005    lobectomy upper lung caner    LUNG VOLUME REDUCTION      OTHER SURGICAL HISTORY      artifical joints/limbs    REPLACEMENT TOTAL KNEE Left 2016    UPPER GASTROINTESTINAL ENDOSCOPY         Allergies:  Patient has no known allergies.    Home medications:  Medications Prior to Admission   Medication Sig Dispense Refill Last Dose    albuterol sulfate  (90 Base) MCG/ACT inhaler Inhale 2 puffs Every 4 (Four) Hours As Needed for Wheezing.       aspirin 81 MG chewable tablet Chew 1 tablet Daily.       carvedilol  (Coreg) 25 MG tablet Take 1 tablet by mouth 2 (Two) Times a Day With Meals for 30 days. 60 tablet 0     cilostazol (PLETAL) 100 MG tablet Take 1 tablet by mouth 2 (two) times a day.       dexlansoprazole (DEXILANT) 60 MG capsule Take 1 capsule by mouth Daily.       ergocalciferol (ERGOCALCIFEROL) 1.25 MG (59055 UT) capsule Take 1 capsule by mouth Every 14 (Fourteen) Days.       levocetirizine (XYZAL) 5 MG tablet TAKE 1 TABLET DAILY (Patient taking differently: Take 1 tablet by mouth Every Evening.) 90 tablet 1     linagliptin (Tradjenta) 5 MG tablet tablet Take 1 tablet by mouth Daily. 90 tablet 1     nitroglycerin (NITROSTAT) 0.4 MG SL tablet Place 1 tablet under the tongue Every 5 (Five) Minutes As Needed for Chest Pain.       polycarbophil (calcium polycarbophil) 625 MG tablet tablet Take 1 tablet by mouth Daily As Needed.       rosuvastatin (CRESTOR) 20 MG tablet Take 1 tablet by mouth Daily. 90 tablet 3     sevelamer (RENVELA) 800 MG tablet Take 1 tablet by mouth 3 (Three) Times a Day With Meals.       Symbicort 160-4.5 MCG/ACT inhaler Inhale 2 puffs 2 (Two) Times a Day As Needed.       traMADol (ULTRAM) 50 MG tablet Take 1 tablet by mouth Every 8 (Eight) Hours As Needed.           Hospital medications:  budesonide-formoterol, 2 puff, Inhalation, BID - RT  carvedilol, 25 mg, Oral, Q12H  cefTRIAXone, 1,000 mg, Intravenous, Q24H  hydrALAZINE, 25 mg, Oral, Q12H  insulin lispro, 2-7 Units, Subcutaneous, 4x Daily AC & at Bedtime  pantoprazole, 40 mg, Oral, BID AC  polycarbophil, 625 mg, Oral, Daily  rosuvastatin, 20 mg, Oral, Nightly  sevelamer, 800 mg, Oral, TID With Meals           acetaminophen **OR** acetaminophen **OR** acetaminophen    albuterol    senna-docusate sodium **AND** polyethylene glycol **AND** bisacodyl **AND** bisacodyl    dextrose    dextrose    glucagon (human recombinant)    HYDROcodone-acetaminophen    ondansetron    Family history:  Family History   Problem Relation Age of Onset    Arthritis  Mother     Arthritis Father     Cancer Brother     Diabetes Brother     Other Brother         blood disease     Prostate cancer Brother     Cancer Brother     Prostate cancer Brother     Cancer Brother     Cancer Brother     Malig Hyperthermia Neg Hx     Colon cancer Neg Hx        Social history:  Social History     Tobacco Use    Smoking status: Former     Current packs/day: 0.00     Average packs/day: 1 pack/day for 52.5 years (52.5 ttl pk-yrs)     Types: Cigarettes     Start date:      Quit date: 2004     Years since quittin.7    Smokeless tobacco: Never   Vaping Use    Vaping status: Never Used   Substance Use Topics    Alcohol use: Never    Drug use: Never       REVIEW OF SYSTEMS:  Constitutional - Negative for fevers/chills  Eyes/Ears/Nose/Mouth/Throat - Negative for changes in vision  Cardiovascular - Negative for chest pain, dysrhythmia  Respiratory - Negative for dyspnea  Gastrointestinal - Negative for nausea or vomiting  Genitourinary - Negative for dysuria  Hematologic/Lymphatic - Negative for bruising  Skin - Negative for erythema  Endocrine - history of diabetes    Objective:  TMax 24 hours:   Temp (24hrs), Av.7 °F (36.5 °C), Min:97.2 °F (36.2 °C), Max:98 °F (36.7 °C)      Vitals Ranges:   Temp:  [97.2 °F (36.2 °C)-98 °F (36.7 °C)] 97.8 °F (36.6 °C)  Heart Rate:  [79-84] 81  Resp:  [14-18] 14  BP: (113-161)/(47-75) 136/70    Intake/Output Last 3 shifts:  No intake/output data recorded.     Physical Exam:    General Appearance:    Alert, cooperative, NAD   HEENT:    No trauma, pupils reactive, hearing intact   Back:     No CVA tenderness   Lungs:     Respirations unlabored, no wheezing    Heart:    RRR, intact peripheral pulses   Abdomen:     Soft, NDNT, no masses, no guarding   :    Not examined   Extremities:   No edema, no deformity   Lymphatic:   No neck or groin LAD   Skin:   No bleeding, bruising or rashes   Neuro/Psych:   Orientation intact, mood/affect pleasant, no focal  findings       Results review:   I reviewed the patient's new clinical results.    Data review:  Lab Results (last 24 hours)       Procedure Component Value Units Date/Time    Renal Function Panel [[id number]]  (Abnormal) Collected: 03/04/24 0511    Specimen: Blood Updated: 03/04/24 0615     Glucose 116 mg/dL      BUN 49 mg/dL      Creatinine 4.54 mg/dL      Sodium 138 mmol/L      Potassium 4.5 mmol/L      Chloride 101 mmol/L      CO2 25.0 mmol/L      Calcium 8.1 mg/dL      Albumin 3.2 g/dL      Phosphorus 4.3 mg/dL      Anion Gap 12.0 mmol/L      BUN/Creatinine Ratio 10.8     eGFR 8.9 mL/min/1.73      Comment: <15 Indicative of kidney failure       Narrative:      GFR Normal >60  Chronic Kidney Disease <60  Kidney Failure <15    The GFR formula is only valid for adults with stable renal function between ages 18 and 70.    POC Glucose Once [[id number]]  (Normal) Collected: 03/04/24 0606    Specimen: Blood Updated: 03/04/24 0608     Glucose 97 mg/dL     CBC Auto Differential [[id number]]  (Abnormal) Collected: 03/04/24 0511    Specimen: Blood Updated: 03/04/24 0558     WBC 7.09 10*3/mm3      RBC 2.84 10*6/mm3      Hemoglobin 8.7 g/dL      Hematocrit 27.8 %      MCV 97.9 fL      MCH 30.6 pg      MCHC 31.3 g/dL      RDW 13.6 %      RDW-SD 48.3 fl      MPV 9.6 fL      Platelets 164 10*3/mm3      Neutrophil % 67.7 %      Lymphocyte % 14.2 %      Monocyte % 15.2 %      Eosinophil % 2.5 %      Basophil % 0.3 %      Immature Grans % 0.1 %      Neutrophils, Absolute 4.79 10*3/mm3      Lymphocytes, Absolute 1.01 10*3/mm3      Monocytes, Absolute 1.08 10*3/mm3      Eosinophils, Absolute 0.18 10*3/mm3      Basophils, Absolute 0.02 10*3/mm3      Immature Grans, Absolute 0.01 10*3/mm3      nRBC 0.0 /100 WBC     Lactic Acid, Plasma [[id number]]  (Normal) Collected: 03/03/24 2027    Specimen: Blood Updated: 03/03/24 2129     Lactate 1.4 mmol/L     Blood Culture - Blood, Arm, Right [[id number]] Collected: 03/03/24 2027    Specimen: Blood  from Arm, Right Updated: 03/03/24 2102    Blood Culture - Blood, Arm, Left [[id number]] Collected: 03/03/24 2040    Specimen: Blood from Arm, Left Updated: 03/03/24 2102    POC Glucose Once [[id number]]  (Abnormal) Collected: 03/03/24 2024    Specimen: Blood Updated: 03/03/24 2026     Glucose 174 mg/dL              Imaging:  Imaging Results (Last 24 Hours)       ** No results found for the last 24 hours. **               Assessment:      ESRD on dialysis   Right Perinephric / Subcapsular hematoma     - Spontaneous right subcapsular hemorrhage     Plan:      - Advise conservative mgmt   - Serial Hb/Hct  - Serial Imaging - Another CT stone protocol in 1-2 days  - We will try to avoid IR Embolization unless necessary for continued hemorrhage  - Dialysis per Nephrology.  - Urology will follow     Eleazar Molina MD  03/04/24  08:37 EST

## 2024-03-04 NOTE — PROGRESS NOTES
FIRST UROLOGY CONSULT      Patient Identification:  NAME:  Charlette Rai  Age:  89 y.o.   Sex:  female   :  1935   MRN:  [id number]       Chief complaint: Right perinephric hematoma    History of present illness:  Charlette Rai is a 89 y.o. woman, transferred from [facility] with findings of Right perinephric hematoma. ESRD on Dialysis. Having some right-side soreness. Very pleasant woman. Some mild dysuria.    Cr 4.54  WBC 7.09  Hct 27.8      CT 3/3/2024  1. Increased density surrounding the right kidney compatible with a subcapsular hematoma.  There is   no evidence of obstructive uropathy  2. There is a heavy stool burden.  GI tract demonstrates no definite focal acute inflammatory   change  3. Cholelithiasis  4. Distal findings as above    I personally viewed the available imaging including most recent imaging and made an independent assessment.      Past medical history:  Past Medical History:   Diagnosis Date    Allergic rhinitis     Anemia     Arthritis     Asthma     USE INHALERS AND NEBULIZERS    Back pain     Bladder disorder     Cancer     LEFT LUNG CANCER  SURGERY AND CHEMO DONE  AND CURRENTLY   RIGHT LUNG CANCER HAS ONLY RECEIVED RADIATION THUS FAR    Chronic kidney disease     stage 3    CKD (chronic kidney disease) stage 4, GFR 15-29 ml/min     Condition not found     ulcer    Congestive heart failure (CHF)     FOLLOWED BY DR AQUINO. DENIES CP BUT DOES HAVE SOA CHRONIC ISSUE COPD/LUNG CANCER    COPD (chronic obstructive pulmonary disease)     FOLLOWED BY DR MARIAJOSE BAILEY    Coronary artery disease     DENIES CP BUT DOES GET SOA MOST OF THE TIME WITH EXERTION BUT OCC AT REST CHRONIC ISSUE COPD/LUNG CANCER    Deep vein thrombosis     Diabetes mellitus     DOES NOT CHECK BS DAILY    Disease of thyroid gland     HYPOTHYROIDISM    Essential hypertension     Gastric ulcer     GERD (gastroesophageal reflux disease)     Heart murmur     History of transfusion     NO ISSUES POST  TRANSFUSION WAS MANY YEARS AGO    Hyperlipemia     Leukocytopenia     FOLLOWED BY DR MIR BAILEY    Limb swelling     Lumbago     Lumbar spinal stenosis     Lung cancer     Migraine headache     Multiple joint pain     On home oxygen therapy     3L/NC PRN    Osteopenia     Reflux esophagitis     Shortness of breath     Thyroid nodule     HAS ULTRASOUND YEARLY BEING MONITORED    Vascular disease     Vitamin D deficiency        Past surgical history:  Past Surgical History:   Procedure Laterality Date    ABDOMINAL SURGERY      APPENDECTOMY      ARTERIOVENOUS FISTULA/SHUNT SURGERY Left 05/10/2022    Procedure: Left basilic vein transposition;  Surgeon: Wan Barksdale MD;  Location: [facility] MAIN OR;  Service: Vascular;  Laterality: Left;    ARTERIOVENOUS FISTULA/SHUNT SURGERY Left 3/23/2023    Procedure: Ligation of left arm arteriovenous fistula;  Surgeon: Wan Barksdale MD;  Location: [facility] MAIN OR;  Service: Vascular;  Laterality: Left;    ARTERIOVENOUS FISTULA/SHUNT SURGERY Left 11/30/2023    Procedure: Creation of left arm arteriovenous graft;  Surgeon: Wan Barksdale MD;  Location: [facility] MAIN OR;  Service: Vascular;  Laterality: Left;    CARDIAC CATHETERIZATION  1996    CARDIAC SURGERY      CARDIAC SURGERY      fluid drained from heart    CATARACT EXTRACTION, BILATERAL  2003    COLONOSCOPY  2014    ENDOSCOPY  2016    2019    FEMORAL ARTERY STENT Bilateral     HYSTERECTOMY      LUNG BIOPSY Left 2005    lobectomy upper lung caner    LUNG VOLUME REDUCTION      OTHER SURGICAL HISTORY      artifical joints/limbs    REPLACEMENT TOTAL KNEE Left 2016    UPPER GASTROINTESTINAL ENDOSCOPY         Allergies:  Patient has no known allergies.    Home medications:  Medications Prior to Admission   Medication Sig Dispense Refill Last Dose    albuterol sulfate  (90 Base) MCG/ACT inhaler Inhale 2 puffs Every 4 (Four) Hours As Needed for Wheezing.       aspirin 81 MG chewable tablet Chew 1 tablet Daily.       carvedilol  (Coreg) 25 MG tablet Take 1 tablet by mouth 2 (Two) Times a Day With Meals for 30 days. 60 tablet 0     cilostazol (PLETAL) 100 MG tablet Take 1 tablet by mouth 2 (two) times a day.       dexlansoprazole (DEXILANT) 60 MG capsule Take 1 capsule by mouth Daily.       ergocalciferol (ERGOCALCIFEROL) 1.25 MG (16384 UT) capsule Take 1 capsule by mouth Every 14 (Fourteen) Days.       levocetirizine (XYZAL) 5 MG tablet TAKE 1 TABLET DAILY (Patient taking differently: Take 1 tablet by mouth Every Evening.) 90 tablet 1     linagliptin (Tradjenta) 5 MG tablet tablet Take 1 tablet by mouth Daily. 90 tablet 1     nitroglycerin (NITROSTAT) 0.4 MG SL tablet Place 1 tablet under the tongue Every 5 (Five) Minutes As Needed for Chest Pain.       polycarbophil (calcium polycarbophil) 625 MG tablet tablet Take 1 tablet by mouth Daily As Needed.       rosuvastatin (CRESTOR) 20 MG tablet Take 1 tablet by mouth Daily. 90 tablet 3     sevelamer (RENVELA) 800 MG tablet Take 1 tablet by mouth 3 (Three) Times a Day With Meals.       Symbicort 160-4.5 MCG/ACT inhaler Inhale 2 puffs 2 (Two) Times a Day As Needed.       traMADol (ULTRAM) 50 MG tablet Take 1 tablet by mouth Every 8 (Eight) Hours As Needed.           Hospital medications:  budesonide-formoterol, 2 puff, Inhalation, BID - RT  carvedilol, 25 mg, Oral, Q12H  cefTRIAXone, 1,000 mg, Intravenous, Q24H  hydrALAZINE, 25 mg, Oral, Q12H  insulin lispro, 2-7 Units, Subcutaneous, 4x Daily AC & at Bedtime  pantoprazole, 40 mg, Oral, BID AC  polycarbophil, 625 mg, Oral, Daily  rosuvastatin, 20 mg, Oral, Nightly  sevelamer, 800 mg, Oral, TID With Meals           acetaminophen **OR** acetaminophen **OR** acetaminophen    albuterol    senna-docusate sodium **AND** polyethylene glycol **AND** bisacodyl **AND** bisacodyl    dextrose    dextrose    glucagon (human recombinant)    HYDROcodone-acetaminophen    ondansetron    Family history:  Family History   Problem Relation Age of Onset    Arthritis  Mother     Arthritis Father     Cancer Brother     Diabetes Brother     Other Brother         blood disease     Prostate cancer Brother     Cancer Brother     Prostate cancer Brother     Cancer Brother     Cancer Brother     Malig Hyperthermia Neg Hx     Colon cancer Neg Hx        Social history:  Social History     Tobacco Use    Smoking status: Former     Current packs/day: 0.00     Average packs/day: 1 pack/day for 52.5 years (52.5 ttl pk-yrs)     Types: Cigarettes     Start date:      Quit date: 2004     Years since quittin.7    Smokeless tobacco: Never   Vaping Use    Vaping status: Never Used   Substance Use Topics    Alcohol use: Never    Drug use: Never       REVIEW OF SYSTEMS:  Constitutional - Negative for fevers/chills  Eyes/Ears/Nose/Mouth/Throat - Negative for changes in vision  Cardiovascular - Negative for chest pain, dysrhythmia  Respiratory - Negative for dyspnea  Gastrointestinal - Negative for nausea or vomiting  Genitourinary - Negative for dysuria  Hematologic/Lymphatic - Negative for bruising  Skin - Negative for erythema  Endocrine - Negative for history of diabetes    Objective:  TMax 24 hours:   Temp (24hrs), Av.7 °F (36.5 °C), Min:97.2 °F (36.2 °C), Max:98 °F (36.7 °C)      Vitals Ranges:   Temp:  [97.2 °F (36.2 °C)-98 °F (36.7 °C)] 97.8 °F (36.6 °C)  Heart Rate:  [79-84] 81  Resp:  [14-18] 14  BP: (113-161)/(47-75) 136/70    Intake/Output Last 3 shifts:  No intake/output data recorded.     Physical Exam:    General Appearance:    Alert, cooperative, NAD   HEENT:    No trauma, pupils reactive, hearing intact   Back:     No CVA tenderness   Lungs:     Respirations unlabored, no wheezing    Heart:    RRR, intact peripheral pulses   Abdomen:     Soft, NDNT, no masses, no guarding   :    Not examined   Extremities:   No edema, no deformity   Lymphatic:   No neck or groin LAD   Skin:   No bleeding, bruising or rashes   Neuro/Psych:   Orientation intact, mood/affect pleasant,  no focal findings       Results review:   I reviewed the patient's new clinical results.    Data review:  Lab Results (last 24 hours)       Procedure Component Value Units Date/Time    Renal Function Panel [[id number]]  (Abnormal) Collected: 03/04/24 0511    Specimen: Blood Updated: 03/04/24 0615     Glucose 116 mg/dL      BUN 49 mg/dL      Creatinine 4.54 mg/dL      Sodium 138 mmol/L      Potassium 4.5 mmol/L      Chloride 101 mmol/L      CO2 25.0 mmol/L      Calcium 8.1 mg/dL      Albumin 3.2 g/dL      Phosphorus 4.3 mg/dL      Anion Gap 12.0 mmol/L      BUN/Creatinine Ratio 10.8     eGFR 8.9 mL/min/1.73      Comment: <15 Indicative of kidney failure       Narrative:      GFR Normal >60  Chronic Kidney Disease <60  Kidney Failure <15    The GFR formula is only valid for adults with stable renal function between ages 18 and 70.    POC Glucose Once [[id number]]  (Normal) Collected: 03/04/24 0606    Specimen: Blood Updated: 03/04/24 0608     Glucose 97 mg/dL     CBC Auto Differential [[id number]]  (Abnormal) Collected: 03/04/24 0511    Specimen: Blood Updated: 03/04/24 0558     WBC 7.09 10*3/mm3      RBC 2.84 10*6/mm3      Hemoglobin 8.7 g/dL      Hematocrit 27.8 %      MCV 97.9 fL      MCH 30.6 pg      MCHC 31.3 g/dL      RDW 13.6 %      RDW-SD 48.3 fl      MPV 9.6 fL      Platelets 164 10*3/mm3      Neutrophil % 67.7 %      Lymphocyte % 14.2 %      Monocyte % 15.2 %      Eosinophil % 2.5 %      Basophil % 0.3 %      Immature Grans % 0.1 %      Neutrophils, Absolute 4.79 10*3/mm3      Lymphocytes, Absolute 1.01 10*3/mm3      Monocytes, Absolute 1.08 10*3/mm3      Eosinophils, Absolute 0.18 10*3/mm3      Basophils, Absolute 0.02 10*3/mm3      Immature Grans, Absolute 0.01 10*3/mm3      nRBC 0.0 /100 WBC     Lactic Acid, Plasma [[id number]]  (Normal) Collected: 03/03/24 2027    Specimen: Blood Updated: 03/03/24 2129     Lactate 1.4 mmol/L     Blood Culture - Blood, Arm, Right [[id number]] Collected: 03/03/24 2027     Specimen: Blood from Arm, Right Updated: 03/03/24 2102    Blood Culture - Blood, Arm, Left [[id number]] Collected: 03/03/24 2040    Specimen: Blood from Arm, Left Updated: 03/03/24 2102    POC Glucose Once [[id number]]  (Abnormal) Collected: 03/03/24 2024    Specimen: Blood Updated: 03/03/24 2026     Glucose 174 mg/dL              Imaging:  Imaging Results (Last 24 Hours)       ** No results found for the last 24 hours. **               Assessment:     ESRD on dialysis   Right Perinephric / Subcapsular hematoma    - Spontaneous right subcapsular hemorrhage    Plan:     - Advise conservative mgmt   - Serial Hb/Hct  - Serial Imaging - Another CT stone protocol in 1-2 days  - We will try to avoid IR Embolization unless necessary for continued hemorrhage  - Dialysis per Nephrology.  - Urology will follow       Eleazar Molina MD  03/04/24  08:31 EST

## 2024-03-04 NOTE — PROGRESS NOTES
Continued Stay Note  [facility]     Patient Name: Charlette Rai  MRN: [id number]  Today's Date: 3/4/2024    Admit Date: 3/3/2024    Plan: Home and continue with [facility] on MWF   Discharge Plan       Row Name 03/04/24 1306       Plan    Plan Home and continue with [facility] on MWF    Plan Comments Introduced self and role of CCP. Patient confirmed DC plan is to return to home and continue with HD through [facility] in [place].  Stated she get HD on MWF with chair time at 11:15am. Stated she has been on HD since November 2023. Stated she is independent with ADL's. Uses cane and/or walker as needed. Stated her spouse will assist as needed and will provide transportation at [place]. Denies any needs/equipment.                   Discharge Codes    No documentation.                       Sanjuana Dsouza RN

## 2024-03-04 NOTE — H&P
HISTORY AND PHYSICAL   [facility]        Date of Admission: 3/3/2024  Patient Identification:  Name: Charlette Rai  Age: 89 y.o.  Sex: female  :  1935  MRN: [id number]                     Primary Care Physician: Rafael Lyons MD    Chief Complaint:  89 year old transferred from [facility]; she presented with right sided flank pain; she has a history of esrd and is on dialysis; she has had burning with urination; no fever or chills; workup revealed a subcapsular hematoma and transfer was requested    History of Present Illness:   As above3    Past Medical History:  Past Medical History:   Diagnosis Date    Allergic rhinitis     Anemia     Arthritis     Asthma     USE INHALERS AND NEBULIZERS    Back pain     Bladder disorder     Cancer     LEFT LUNG CANCER  SURGERY AND CHEMO DONE  AND CURRENTLY   RIGHT LUNG CANCER HAS ONLY RECEIVED RADIATION THUS FAR    Chronic kidney disease     stage 3    CKD (chronic kidney disease) stage 4, GFR 15-29 ml/min     Condition not found     ulcer    Congestive heart failure (CHF)     FOLLOWED BY DR AQUINO. DENIES CP BUT DOES HAVE SOA CHRONIC ISSUE COPD/LUNG CANCER    COPD (chronic obstructive pulmonary disease)     FOLLOWED BY DR MARIAJOSE BAILEY    Coronary artery disease     DENIES CP BUT DOES GET SOA MOST OF THE TIME WITH EXERTION BUT OCC AT REST CHRONIC ISSUE COPD/LUNG CANCER    Deep vein thrombosis     Diabetes mellitus     DOES NOT CHECK BS DAILY    Disease of thyroid gland     HYPOTHYROIDISM    Essential hypertension     Gastric ulcer     GERD (gastroesophageal reflux disease)     Heart murmur     History of transfusion     NO ISSUES POST TRANSFUSION WAS MANY YEARS AGO    Hyperlipemia     Leukocytopenia     FOLLOWED BY DR MIR BAILEY    Limb swelling     Lumbago     Lumbar spinal stenosis     Lung cancer     Migraine headache     Multiple joint pain     On home oxygen therapy     3L/NC PRN    Osteopenia     Reflux esophagitis     Shortness of breath      Thyroid nodule     HAS ULTRASOUND YEARLY BEING MONITORED    Vascular disease     Vitamin D deficiency      Past Surgical History:  Past Surgical History:   Procedure Laterality Date    ABDOMINAL SURGERY      APPENDECTOMY      ARTERIOVENOUS FISTULA/SHUNT SURGERY Left 05/10/2022    Procedure: Left basilic vein transposition;  Surgeon: Wan Barksdale MD;  Location: [facility] MAIN OR;  Service: Vascular;  Laterality: Left;    ARTERIOVENOUS FISTULA/SHUNT SURGERY Left 3/23/2023    Procedure: Ligation of left arm arteriovenous fistula;  Surgeon: Wan Barksdale MD;  Location: [facility] MAIN OR;  Service: Vascular;  Laterality: Left;    ARTERIOVENOUS FISTULA/SHUNT SURGERY Left 11/30/2023    Procedure: Creation of left arm arteriovenous graft;  Surgeon: Wan Barksdale MD;  Location: [facility] MAIN OR;  Service: Vascular;  Laterality: Left;    CARDIAC CATHETERIZATION  1996    CARDIAC SURGERY      CARDIAC SURGERY      fluid drained from heart    CATARACT EXTRACTION, BILATERAL  2003    COLONOSCOPY  2014    ENDOSCOPY  2016 2019    FEMORAL ARTERY STENT Bilateral     HYSTERECTOMY      LUNG BIOPSY Left 2005    lobectomy upper lung caner    LUNG VOLUME REDUCTION      OTHER SURGICAL HISTORY      artifical joints/limbs    REPLACEMENT TOTAL KNEE Left 2016    UPPER GASTROINTESTINAL ENDOSCOPY        Home Meds:  Medications Prior to Admission   Medication Sig Dispense Refill Last Dose    albuterol sulfate  (90 Base) MCG/ACT inhaler Inhale 2 puffs Every 4 (Four) Hours As Needed for Wheezing.       aspirin 81 MG chewable tablet Chew 1 tablet Daily.       carvedilol (Coreg) 25 MG tablet Take 1 tablet by mouth 2 (Two) Times a Day With Meals for 30 days. 60 tablet 0     cilostazol (PLETAL) 100 MG tablet Take 1 tablet by mouth 2 (two) times a day.       dexlansoprazole (DEXILANT) 60 MG capsule Take 1 capsule by mouth Daily.       ergocalciferol (ERGOCALCIFEROL) 1.25 MG (59872 UT) capsule Take 1 capsule by mouth Every 14 (Fourteen) Days.        levocetirizine (XYZAL) 5 MG tablet TAKE 1 TABLET DAILY (Patient taking differently: Take 1 tablet by mouth Every Evening.) 90 tablet 1     linagliptin (Tradjenta) 5 MG tablet tablet Take 1 tablet by mouth Daily. 90 tablet 1     nitroglycerin (NITROSTAT) 0.4 MG SL tablet Place 1 tablet under the tongue Every 5 (Five) Minutes As Needed for Chest Pain.       polycarbophil (calcium polycarbophil) 625 MG tablet tablet Take 1 tablet by mouth Daily.       rosuvastatin (CRESTOR) 20 MG tablet Take 1 tablet by mouth Daily. 90 tablet 3     sevelamer (RENVELA) 800 MG tablet Take 1 tablet by mouth 3 (Three) Times a Day With Meals.       Symbicort 160-4.5 MCG/ACT inhaler Inhale 2 puffs 2 (two) times a day.       traMADol (ULTRAM) 50 MG tablet Take 1 tablet by mouth Every 8 (Eight) Hours As Needed.       bumetanide (BUMEX) 1 MG tablet Take 3 tablets by mouth Daily for 30 days. 90 tablet 0     hydrALAZINE (APRESOLINE) 25 MG tablet Take 1 tablet by mouth 2 (Two) Times a Day for 30 days. 60 tablet 0        Allergies:  No Known Allergies  Immunizations:  Immunization History   Administered Date(s) Administered    COVID-19 (PFIZER) BIVALENT 12+YRS 10/13/2022    COVID-19 (PFIZER) Purple Cap Monovalent 03/07/2021, 04/04/2021, 10/31/2021    COVID-19 F23 (PFIZER) 12YRS+ (COMIRNATY) 10/19/2023    Fluad Quad 65+ 10/19/2023    Fluzone High Dose =>65 Years ([employer] ONLY) 10/09/2018, 10/15/2020, 10/12/2021, 09/19/2022    Influenza, Unspecified 10/13/2021    Pneumococcal Conjugate 13-Valent (PCV13) 10/21/2015    Pneumococcal Polysaccharide (PPSV23) 10/30/2003, 11/08/2016     Social History:   Social History     Social History Narrative    Not on file     Social History     Socioeconomic History    Marital status:    Tobacco Use    Smoking status: Former     Current packs/day: 0.00     Average packs/day: 1 pack/day for 52.5 years (52.5 ttl pk-yrs)     Types: Cigarettes     Start date: 1952     Quit date: 6/23/2004     Years since  quittin.7    Smokeless tobacco: Never   Vaping Use    Vaping status: Never Used   Substance and Sexual Activity    Alcohol use: Never    Drug use: Never    Sexual activity: Defer       Family History:  Family History   Problem Relation Age of Onset    Arthritis Mother     Arthritis Father     Cancer Brother     Diabetes Brother     Other Brother         blood disease     Prostate cancer Brother     Cancer Brother     Prostate cancer Brother     Cancer Brother     Cancer Brother     Malig Hyperthermia Neg Hx     Colon cancer Neg Hx         Review of Systems  See history of present illness and past medical history.  Patient denies headache, dizziness, syncope, falls, trauma, change in vision, change in hearing, change in taste, changes in weight, changes in appetite, focal weakness, numbness, or paresthesia.  Patient denies chest pain, palpitations, dyspnea, orthopnea, PND, cough, sinus pressure, rhinorrhea, epistaxis, hemoptysis, nausea, vomiting,hematemesis, diarrhea, constipation or hematochezia.  Denies cold or heat intolerance, polydipsia, polyuria, polyphagia. Denies hematuria, pyuria, dysuria, hesitancy, frequency or urgency. Denies consumption of raw and under cooked meats foods or change in water source.       Objective:  T Max 24 hrs: Temp (24hrs), Av.5 °F (36.4 °C), Min:97.2 °F (36.2 °C), Max:97.8 °F (36.6 °C)    Vitals Ranges:   Temp:  [97.2 °F (36.2 °C)-97.8 °F (36.6 °C)] 97.2 °F (36.2 °C)  Heart Rate:  [79-84] 81  Resp:  [18] 18  BP: (113-161)/(47-75) 161/63      Exam:  /63 (BP Location: Right arm, Patient Position: Lying)   Pulse 81   Temp 97.2 °F (36.2 °C) (Oral)   Resp 18   LMP  (LMP Unknown)   SpO2 96%     General Appearance:    Alert, cooperative, no distress, appears stated age   Head:    Normocephalic, without obvious abnormality, atraumatic   Eyes:    PERRL, conjunctivae/corneas clear, EOM's intact, both eyes   Ears:    Normal external ear canals, both ears   Nose:   Nares  normal, septum midline, mucosa normal, no drainage    or sinus tenderness   Throat:   Lips, mucosa, and tongue normal   Neck:   Supple, symmetrical, trachea midline, no adenopathy;     thyroid:  no enlargement/tenderness/nodules; no carotid    bruit or JVD   Back:     Symmetric, no curvature, ROM normal, no CVA tenderness   Lungs:     Clear to auscultation bilaterally, respirations unlabored   Chest Wall:    No tenderness or deformity    Heart:    Regular rate and rhythm, S1 and S2 normal, no murmur, rub   or gallop   Abdomen:     Soft, nontender, bowel sounds active all four quadrants,     no masses, no hepatomegaly, no splenomegaly   Extremities:   Extremities normal, atraumatic, no cyanosis or edema                       .    Data Review:  Labs in chart were reviewed.  WBC   Date Value Ref Range Status   03/03/2024 10.28 3.40 - 10.80 10*3/mm3 Final     Hemoglobin   Date Value Ref Range Status   03/03/2024 9.7 (L) 12.0 - 15.9 g/dL Final     Hematocrit   Date Value Ref Range Status   03/03/2024 30.8 (L) 34.0 - 46.6 % Final     Platelets   Date Value Ref Range Status   03/03/2024 177 140 - 450 10*3/mm3 Final     Sodium   Date Value Ref Range Status   03/03/2024 138 136 - 145 mmol/L Final     Potassium   Date Value Ref Range Status   03/03/2024 4.0 3.5 - 5.2 mmol/L Final     Chloride   Date Value Ref Range Status   03/03/2024 101 98 - 107 mmol/L Final     CO2   Date Value Ref Range Status   03/03/2024 26.6 22.0 - 29.0 mmol/L Final     BUN   Date Value Ref Range Status   03/03/2024 42 (H) 8 - 23 mg/dL Final     Creatinine   Date Value Ref Range Status   03/03/2024 3.72 (H) 0.57 - 1.00 mg/dL Final     Glucose   Date Value Ref Range Status   03/03/2024 204 (H) 65 - 99 mg/dL Final     Calcium   Date Value Ref Range Status   03/03/2024 8.2 (L) 8.6 - 10.5 mg/dL Final     AST (SGOT)   Date Value Ref Range Status   03/03/2024 11 1 - 32 U/L Final     ALT (SGPT)   Date Value Ref Range Status   03/03/2024 9 1 - 33 U/L Final      Alkaline Phosphatase   Date Value Ref Range Status   03/03/2024 199 (H) 39 - 117 U/L Final                Imaging Results (All)       None              Assessment:  Active Hospital Problems    Diagnosis  POA    **Hematoma [T14.8XXA]  Yes      Resolved Hospital Problems   No resolved problems to display.   Esrd on hd  Uti  Diabetes  Hypertension  Copd  anemia    Plan:  Continue rocephin  Monitor hematoma  Trend hgb  Urology to see  Nephrology input appreciated  Accu checks, sliding scale  Dw patient and dr eller who accepted her in transfer earlier today    Lavern Del Rosario MD  3/3/2024  19:34 EST

## 2024-03-05 LAB
ALBUMIN SERPL-MCNC: 3.5 G/DL (ref 3.5–5.2)
ANION GAP SERPL CALCULATED.3IONS-SCNC: 8 MMOL/L (ref 5–15)
BASOPHILS # BLD AUTO: 0.04 10*3/MM3 (ref 0–0.2)
BASOPHILS NFR BLD AUTO: 0.6 % (ref 0–1.5)
BUN SERPL-MCNC: 26 MG/DL (ref 8–23)
BUN/CREAT SERPL: 8.3 (ref 7–25)
CALCIUM SPEC-SCNC: 8.4 MG/DL (ref 8.6–10.5)
CHLORIDE SERPL-SCNC: 102 MMOL/L (ref 98–107)
CO2 SERPL-SCNC: 29 MMOL/L (ref 22–29)
CREAT SERPL-MCNC: 3.12 MG/DL (ref 0.57–1)
DEPRECATED RDW RBC AUTO: 48.8 FL (ref 37–54)
EGFRCR SERPLBLD CKD-EPI 2021: 13.9 ML/MIN/1.73
EOSINOPHIL # BLD AUTO: 0.18 10*3/MM3 (ref 0–0.4)
EOSINOPHIL NFR BLD AUTO: 2.6 % (ref 0.3–6.2)
ERYTHROCYTE [DISTWIDTH] IN BLOOD BY AUTOMATED COUNT: 13.6 % (ref 12.3–15.4)
GLUCOSE BLDC GLUCOMTR-MCNC: 132 MG/DL (ref 70–130)
GLUCOSE BLDC GLUCOMTR-MCNC: 133 MG/DL (ref 70–130)
GLUCOSE BLDC GLUCOMTR-MCNC: 174 MG/DL (ref 70–130)
GLUCOSE BLDC GLUCOMTR-MCNC: 92 MG/DL (ref 70–130)
GLUCOSE BLDC GLUCOMTR-MCNC: 93 MG/DL (ref 70–130)
GLUCOSE SERPL-MCNC: 101 MG/DL (ref 65–99)
HCT VFR BLD AUTO: 29.9 % (ref 34–46.6)
HGB BLD-MCNC: 9.5 G/DL (ref 12–15.9)
IMM GRANULOCYTES # BLD AUTO: 0.01 10*3/MM3 (ref 0–0.05)
IMM GRANULOCYTES NFR BLD AUTO: 0.1 % (ref 0–0.5)
LYMPHOCYTES # BLD AUTO: 1.02 10*3/MM3 (ref 0.7–3.1)
LYMPHOCYTES NFR BLD AUTO: 14.8 % (ref 19.6–45.3)
MCH RBC QN AUTO: 30.7 PG (ref 26.6–33)
MCHC RBC AUTO-ENTMCNC: 31.8 G/DL (ref 31.5–35.7)
MCV RBC AUTO: 96.8 FL (ref 79–97)
MONOCYTES # BLD AUTO: 1.16 10*3/MM3 (ref 0.1–0.9)
MONOCYTES NFR BLD AUTO: 16.8 % (ref 5–12)
NEUTROPHILS NFR BLD AUTO: 4.48 10*3/MM3 (ref 1.7–7)
NEUTROPHILS NFR BLD AUTO: 65.1 % (ref 42.7–76)
NRBC BLD AUTO-RTO: 0 /100 WBC (ref 0–0.2)
PHOSPHATE SERPL-MCNC: 4.1 MG/DL (ref 2.5–4.5)
PLATELET # BLD AUTO: 178 10*3/MM3 (ref 140–450)
PMV BLD AUTO: 9.6 FL (ref 6–12)
POTASSIUM SERPL-SCNC: 4.4 MMOL/L (ref 3.5–5.2)
RBC # BLD AUTO: 3.09 10*6/MM3 (ref 3.77–5.28)
SODIUM SERPL-SCNC: 139 MMOL/L (ref 136–145)
WBC NRBC COR # BLD AUTO: 6.89 10*3/MM3 (ref 3.4–10.8)

## 2024-03-05 PROCEDURE — 63710000001 INSULIN LISPRO (HUMAN) PER 5 UNITS: Performed by: INTERNAL MEDICINE

## 2024-03-05 PROCEDURE — 80069 RENAL FUNCTION PANEL: CPT | Performed by: INTERNAL MEDICINE

## 2024-03-05 PROCEDURE — A9270 NON-COVERED ITEM OR SERVICE: HCPCS | Performed by: INTERNAL MEDICINE

## 2024-03-05 PROCEDURE — 82948 REAGENT STRIP/BLOOD GLUCOSE: CPT

## 2024-03-05 PROCEDURE — 63710000001 CARVEDILOL 25 MG TABLET: Performed by: INTERNAL MEDICINE

## 2024-03-05 PROCEDURE — 94664 DEMO&/EVAL PT USE INHALER: CPT

## 2024-03-05 PROCEDURE — 85025 COMPLETE CBC W/AUTO DIFF WBC: CPT | Performed by: INTERNAL MEDICINE

## 2024-03-05 PROCEDURE — 63710000001 HYDROCODONE-ACETAMINOPHEN 7.5-325 MG TABLET: Performed by: INTERNAL MEDICINE

## 2024-03-05 PROCEDURE — 97530 THERAPEUTIC ACTIVITIES: CPT

## 2024-03-05 PROCEDURE — 63710000001 POLYCARBOPHIL 625 MG TABLET: Performed by: INTERNAL MEDICINE

## 2024-03-05 PROCEDURE — 94761 N-INVAS EAR/PLS OXIMETRY MLT: CPT

## 2024-03-05 PROCEDURE — 63710000001 SEVELAMER 800 MG TABLET: Performed by: INTERNAL MEDICINE

## 2024-03-05 PROCEDURE — G0378 HOSPITAL OBSERVATION PER HR: HCPCS

## 2024-03-05 PROCEDURE — 97162 PT EVAL MOD COMPLEX 30 MIN: CPT

## 2024-03-05 PROCEDURE — 63710000001 ROSUVASTATIN 20 MG TABLET: Performed by: INTERNAL MEDICINE

## 2024-03-05 PROCEDURE — 63710000001 PANTOPRAZOLE 40 MG TABLET DELAYED-RELEASE: Performed by: INTERNAL MEDICINE

## 2024-03-05 PROCEDURE — 94799 UNLISTED PULMONARY SVC/PX: CPT

## 2024-03-05 PROCEDURE — 25010000002 CEFTRIAXONE PER 250 MG: Performed by: INTERNAL MEDICINE

## 2024-03-05 RX ORDER — MANNITOL 250 MG/ML
12.5 INJECTION, SOLUTION INTRAVENOUS AS NEEDED
Status: CANCELLED | OUTPATIENT
Start: 2024-03-06 | End: 2024-03-06

## 2024-03-05 RX ADMIN — SEVELAMER CARBONATE 800 MG: 800 TABLET, FILM COATED ORAL at 12:55

## 2024-03-05 RX ADMIN — CARVEDILOL 25 MG: 25 TABLET, FILM COATED ORAL at 21:07

## 2024-03-05 RX ADMIN — SEVELAMER CARBONATE 800 MG: 800 TABLET, FILM COATED ORAL at 09:17

## 2024-03-05 RX ADMIN — ROSUVASTATIN CALCIUM 20 MG: 20 TABLET, FILM COATED ORAL at 21:07

## 2024-03-05 RX ADMIN — BUDESONIDE AND FORMOTEROL FUMARATE DIHYDRATE 2 PUFF: 160; 4.5 AEROSOL RESPIRATORY (INHALATION) at 19:31

## 2024-03-05 RX ADMIN — HYDROCODONE BITARTRATE AND ACETAMINOPHEN 1 TABLET: 7.5; 325 TABLET ORAL at 06:13

## 2024-03-05 RX ADMIN — HYDROCODONE BITARTRATE AND ACETAMINOPHEN 1 TABLET: 7.5; 325 TABLET ORAL at 12:54

## 2024-03-05 RX ADMIN — PANTOPRAZOLE SODIUM 40 MG: 40 TABLET, DELAYED RELEASE ORAL at 18:25

## 2024-03-05 RX ADMIN — CEFTRIAXONE SODIUM 1000 MG: 1 INJECTION, POWDER, FOR SOLUTION INTRAMUSCULAR; INTRAVENOUS at 12:56

## 2024-03-05 RX ADMIN — BUDESONIDE AND FORMOTEROL FUMARATE DIHYDRATE 2 PUFF: 160; 4.5 AEROSOL RESPIRATORY (INHALATION) at 08:21

## 2024-03-05 RX ADMIN — PANTOPRAZOLE SODIUM 40 MG: 40 TABLET, DELAYED RELEASE ORAL at 06:14

## 2024-03-05 RX ADMIN — INSULIN LISPRO 2 UNITS: 100 INJECTION, SOLUTION INTRAVENOUS; SUBCUTANEOUS at 12:54

## 2024-03-05 RX ADMIN — HYDROCODONE BITARTRATE AND ACETAMINOPHEN 1 TABLET: 7.5; 325 TABLET ORAL at 18:25

## 2024-03-05 RX ADMIN — CALCIUM POLYCARBOPHIL 625 MG: 625 TABLET, FILM COATED ORAL at 09:18

## 2024-03-05 RX ADMIN — SEVELAMER CARBONATE 800 MG: 800 TABLET, FILM COATED ORAL at 18:25

## 2024-03-05 NOTE — PROGRESS NOTES
F/U for spontaneous right sandrita-nephric hematoma    Pain on right modest, similar to yesterday, has not resolved    Afebrile, VSs stable  Minimal UOP  Abdom mild tenderness to exam    H/H 9/28 on 3/4, was 10/31 on 3/3, pending today  UA mod blood, mod cee ase, TNTC WBCs, 2+ bact, contaminated specimen    On Rocephin  Await final culture  Await labs from today - hemodynamically stable at this time    Will repeat CT tomorrow   Daily labs  Home soon if stable

## 2024-03-05 NOTE — PROGRESS NOTES
Name: Charlette Rai ADMIT: 3/3/2024   : 1935  PCP: Rafael Lyons MD    MRN: [id number] LOS: 0 days   AGE/SEX: 89 y.o. female  ROOM: [facility]     Subjective   Subjective   Pleasant patient, resting in bed.  She continues to complain of some abdominal soreness.  She tells me that she has been seen by urology and nephrology this morning and they are planning a trial of conservative management.  She has no other questions at this time.       Objective   Objective   Vital Signs  Temp:  [97.3 °F (36.3 °C)-99.3 °F (37.4 °C)] 97.4 °F (36.3 °C)  Heart Rate:  [78-96] 96  Resp:  [16-20] 18  BP: (138-162)/(52-67) 138/59  SpO2:  [93 %-100 %] 93 %  on  Flow (L/min):  [3] 3;   Device (Oxygen Therapy): room air  There is no height or weight on file to calculate BMI.  Physical Exam  Constitutional:       General: She is not in acute distress.     Appearance: She is ill-appearing (chronically).   Cardiovascular:      Rate and Rhythm: Normal rate and regular rhythm.   Pulmonary:      Effort: Pulmonary effort is normal. No respiratory distress.      Breath sounds: Normal breath sounds. No wheezing.   Abdominal:      General: Abdomen is flat. Bowel sounds are normal.      Palpations: Abdomen is soft.      Tenderness: There is abdominal tenderness.   Musculoskeletal:         General: No swelling or tenderness.      Right lower leg: No edema.      Left lower leg: No edema.      Comments: LUE AVF   Skin:     General: Skin is warm and dry.   Neurological:      General: No focal deficit present.      Mental Status: She is alert and oriented to person, place, and time. Mental status is at baseline.         Results Review     I reviewed the patient's new clinical results.  Results from last 7 days   Lab Units 24  0621 24  0511 24  0953   WBC 10*3/mm3 6.89 7.09 10.28   HEMOGLOBIN g/dL 9.5* 8.7* 9.7*   PLATELETS 10*3/mm3 178 164 177     Results from last 7 days   Lab Units 24  0621 24  0511 24  0953    SODIUM mmol/L 139 138 138   POTASSIUM mmol/L 4.4 4.5 4.0   CHLORIDE mmol/L 102 101 101   CO2 mmol/L 29.0 25.0 26.6   BUN mg/dL 26* 49* 42*   CREATININE mg/dL 3.12* 4.54* 3.72*   GLUCOSE mg/dL 101* 116* 204*   Estimated Creatinine Clearance: 13.6 mL/min (A) (by C-G formula based on SCr of 3.12 mg/dL (H)).  Results from last 7 days   Lab Units 03/05/24  0621 03/04/24  0511 03/03/24  0953   ALBUMIN g/dL 3.5 3.2* 3.6   BILIRUBIN mg/dL  --   --  0.2   ALK PHOS U/L  --   --  199*   AST (SGOT) U/L  --   --  11   ALT (SGPT) U/L  --   --  9     Results from last 7 days   Lab Units 03/05/24  0621 03/04/24  0511 03/03/24  0953   CALCIUM mg/dL 8.4* 8.1* 8.2*   ALBUMIN g/dL 3.5 3.2* 3.6   PHOSPHORUS mg/dL 4.1 4.3  --      Results from last 7 days   Lab Units 03/03/24 2027   LACTATE mmol/L 1.4     COVID19   Date Value Ref Range Status   11/09/2023 Not Detected Not Detected - Ref. Range Final   10/29/2023 Not Detected Not Detected - Ref. Range Final     Glucose   Date/Time Value Ref Range Status   03/05/2024 1138 174 (H) 70 - 130 mg/dL Final   03/05/2024 0610 93 70 - 130 mg/dL Final   03/04/2024 2140 165 (H) 70 - 130 mg/dL Final   03/04/2024 1812 114 70 - 130 mg/dL Final   03/04/2024 1105 147 (H) 70 - 130 mg/dL Final   03/04/2024 0606 97 70 - 130 mg/dL Final   03/03/2024 2024 174 (H) 70 - 130 mg/dL Final       CT Abdomen Pelvis Without Contrast  Narrative: PROCEDURE: CT ABDOMEN PELVIS WO CONTRAST     COMPARISON: [facility], CT, CT CHEST WO CONTRAST DIAGNOSTIC, 11/28/2023, 12:16.     INDICATIONS: RLQ AND RIGHT FLANK PAIN, NAUSEA, VOMTING, DIARRHEA     TECHNIQUE: CT images were created without intravenous contrast.       PROTOCOL:   Standard imaging protocol performed      RADIATION:   DLP: 409.3 mGy*cm    Automated exposure control was utilized to minimize radiation dose.      FINDINGS:         Lung bases:  Pleural thickening with calcification posteriorly and at the base of the left   hemidiaphragm with scarring  atelectasis in volume loss noted.  Right lung demonstrates minimal   pleural thickening subpleural changes.  There is improved aeration with some persistent pleural   thickening at the right base.     No free air is noted below the diaphragm.     Organs:  Increased subcapsular density and enlargement of the right kidney with associated   perinephric stranding noted.  Findings suggest a mixed ages subcapsular hematoma measuring at least   3.1 cm in thickness in the mid and inferior portions with abnormality extending from the superior   pole to the inferior pole.  Additional underlying high and low-density lesions likely high and   low-density cysts are noted.  There is no definite hydronephrosis noted.  No obvious calculi are   noted along the visualized or expected course of the ureters.  Several high density lesions are   noted within the left kidney compatible with proteinaceous or hemorrhagic cyst.  No hydronephrosis   noted.  No calculi along the expected course of the left ureter.     Stones are noted layering dependently within the gallbladder.  No acute inflammatory changes   associated with gallbladder noted.  The liver, spleen, pancreas and adrenal glands are grossly   unremarkable in appearance     GI tract:  Postsurgical changes are noted at the GE junction.  There is a small hiatal hernia.  The   stomach is otherwise grossly unremarkable in appearance on motion limited imaging.  Small bowel   demonstrates no evidence of obstruction.  No suspicious mesenteric adenopathy or fluid collection   is noted.  There is a heavy stool burden.  There is no acute abnormality appreciated of the colon.     Pelvis:  The bladder is decompressed and grossly unremarkable in appearance.  Ovaries are not well   visualized.  Patient appears to be status post hysterectomy     Retroperitoneum:  Atherosclerotic changes are noted of the aorta which is normal in caliber.  There   appear to be iliac stents bilaterally.  No  suspicious retroperitoneal adenopathy     Bones and soft tissues:  Fat containing left inguinal hernia.  Multilevel degenerative changes   noted of the spine     Impression:    1. Increased density surrounding the right kidney compatible with a subcapsular hematoma.  There is   no evidence of obstructive uropathy  2. There is a heavy stool burden.  GI tract demonstrates no definite focal acute inflammatory   change  3. Cholelithiasis  4. Distal findings as above        Findings discussed with the ordering provider at the time interpretation          SISI VENEGAS MD         Electronically Signed and Approved By: SISI VENEGAS MD on 3/03/2024 at 11:49                     Scheduled Medications  budesonide-formoterol, 2 puff, Inhalation, BID - RT  carvedilol, 25 mg, Oral, Q12H  cefTRIAXone, 1,000 mg, Intravenous, Q24H  hydrALAZINE, 25 mg, Oral, Q12H  insulin lispro, 2-7 Units, Subcutaneous, 4x Daily AC & at Bedtime  pantoprazole, 40 mg, Oral, BID AC  polycarbophil, 625 mg, Oral, Daily  rosuvastatin, 20 mg, Oral, Nightly  sevelamer, 800 mg, Oral, TID With Meals    Infusions   Diet  Diet: Regular/House; Fluid Consistency: Thin (IDDSI 0)         I have personally reviewed:  [x]  Laboratory   []  Microbiology   [x]  Radiology   [x]  EKG/Telemetry   []  Cardiology/Vascular   []  Pathology   []  Records    Assessment/Plan     Active Hospital Problems    Diagnosis  POA    **Hematoma [T14.8XXA]  Yes    ESRD (end stage renal disease) [N18.6]  Unknown    Essential hypertension [I10]  Yes    Type 2 diabetes mellitus [E11.9]  Unknown      Resolved Hospital Problems   No resolved problems to display.       89 y.o. female admitted with Hematoma.    Subcapsular hematoma, right  -Hemoglobin is stable, continue with conservative management and note plans for repeat CT scan tomorrow per urology    ESRD on HD  Anemia of chronic disease  Metabolic bone disease  -Nephrology consulted, hopeful to receive dialysis earlier in the day  tomorrow so she can get home if discharged  -Continue Phos binders    Type 2 diabetes  -Continue sliding scale insulin; adjust as needed  -A1c 10/23 7.8%    Hypertension  -Continue Coreg, hydralazine    SCDs for DVT prophylaxis.  Full code.  Discussed with patient and nursing staff.  Anticipate discharge  tbd  timing yet to be determined.      Emily Chandra MD  [facility]  03/05/24  13:34 EST    I wore protective equipment throughout this patient encounter including gloves.  Hand hygiene was performed before donning protective equipment and after removal when leaving the room.    Expected Discharge Date and Time       Expected Discharge Date Expected Discharge Time    Mar 6, 2024              Copied text in this note has been reviewed and is accurate as of 03/05/24.

## 2024-03-05 NOTE — PROGRESS NOTES
[facility] Progress Note      Patient Name: Charlette Rai  : 1935  MRN: [id number]  Primary Care Physician:  Rafael Lyons MD  Date of admission: 3/3/2024    Subjective     Interval History:   Follow-up end-stage renal disease     Patient is feeling somewhat better her right flank pain has improved, and she will be observed for her subcapsular right kidney hematoma, she had dialysis yesterday without any difficulties, her hemoglobin is stable and she is monitored by urology    Review of Systems:   As noted above    Objective     Vitals:   Temp:  [97.1 °F (36.2 °C)-99.3 °F (37.4 °C)] 97.3 °F (36.3 °C)  Heart Rate:  [52-90] 78  Resp:  [14-20] 20  BP: (119-162)/(52-67) 154/59  Flow (L/min):  [3] 3    Intake/Output Summary (Last 24 hours) at 3/5/2024 0925  Last data filed at 3/4/2024 1700  Gross per 24 hour   Intake 100 ml   Output 2300 ml   Net -2200 ml       Physical Exam:    General Appearance: Awake, alert, chronically ill, no acute  Skin: warm and dry  HEENT: oral mucosa normal, nonicteric sclera  Neck: supple, no JVD  Lungs: CTA, unlabored breathing effort  Heart: RRR, normal S1 and S2, no rub  Abdomen: soft, nontender, nondistended  : no palpable bladder, she had mild right CVA tenderness  Extremities: no edema, cyanosis or clubbing, functional AV graft in the left upper arm    Scheduled Meds:     budesonide-formoterol, 2 puff, Inhalation, BID - RT  carvedilol, 25 mg, Oral, Q12H  cefTRIAXone, 1,000 mg, Intravenous, Q24H  hydrALAZINE, 25 mg, Oral, Q12H  insulin lispro, 2-7 Units, Subcutaneous, 4x Daily AC & at Bedtime  pantoprazole, 40 mg, Oral, BID AC  polycarbophil, 625 mg, Oral, Daily  rosuvastatin, 20 mg, Oral, Nightly  sevelamer, 800 mg, Oral, TID With Meals      IV Meds:        Results Reviewed:   I have personally reviewed the results from the time of this admission to 3/5/2024 09:25 EST     Results from last 7 days   Lab Units 24  0621 24  0511  03/03/24  0953   SODIUM mmol/L 139 138 138   POTASSIUM mmol/L 4.4 4.5 4.0   CHLORIDE mmol/L 102 101 101   CO2 mmol/L 29.0 25.0 26.6   BUN mg/dL 26* 49* 42*   CREATININE mg/dL 3.12* 4.54* 3.72*   CALCIUM mg/dL 8.4* 8.1* 8.2*   BILIRUBIN mg/dL  --   --  0.2   ALK PHOS U/L  --   --  199*   ALT (SGPT) U/L  --   --  9   AST (SGOT) U/L  --   --  11   GLUCOSE mg/dL 101* 116* 204*       Estimated Creatinine Clearance: 13.6 mL/min (A) (by C-G formula based on SCr of 3.12 mg/dL (H)).    Results from last 7 days   Lab Units 03/05/24  0621 03/04/24  0511   PHOSPHORUS mg/dL 4.1 4.3             Results from last 7 days   Lab Units 03/05/24  0621 03/04/24  0511 03/03/24  0953   WBC 10*3/mm3 6.89 7.09 10.28   HEMOGLOBIN g/dL 9.5* 8.7* 9.7*   PLATELETS 10*3/mm3 178 164 177             Assessment / Plan     ASSESSMENT:  End-stage renal disease on maintenance hemodialysis every Monday, Wednesday and Friday under the care of my partner Dr. García in [place], she appears to be euvolemic, her electrolyte within acceptable range.  She had dialysis yesterday without any difficulties, he appears to be euvolemic and the electrolyte within acceptable range.  Diabetes mellitus type 2 with renal complication  Hypertension with chronic kidney disease reasonably controlled  Spontaneous right subcapsular renal hematoma etiology not very clear, she was evaluated by urology and will be monitored for now.  No indication for for IR embolization at this time that we will continue to be as an option if there is any worsening in the bleed.  Anemia of chronic kidney disease hemoglobin today is 9.5, stable, will continue to monitor  Metabolic bone disease treated with phosphate binder    PLAN:  Continue the same treatment  Hemodialysis tomorrow  Surveillance labs    I reviewed the chart and other providers notes, I reviewed labs.  I discussed the case with the patient and she voiced good understanding  Copied text in this note has been reviewed and is  accurate as of 03/05/24.       Thank you for involving us in the care of Charlette Rai.  Please feel free to call with any questions.    Matt Menendez MD  03/05/24  09:25 [facility]    [facility]  [phone]    Please note that portions of this note were completed with a voice recognition program.

## 2024-03-05 NOTE — THERAPY EVALUATION
Patient Name: Charlette Rai  : 1935    MRN: [id number]                              Today's Date: 3/5/2024       Admit Date: 3/3/2024    Visit Dx: No diagnosis found.  Patient Active Problem List   Diagnosis    Malignant neoplasm of upper lobe of right lung    Allergic rhinitis    Rheumatoid arthritis    Asthma    Chronic obstructive pulmonary disease    Essential hypertension    GERD (gastroesophageal reflux disease)    Hyperlipidemia LDL goal <70    Lumbar spinal stenosis    Migraine    Monoclonal paraproteinemia    Osteopenia    Oxygen dependent    Peripheral neuropathy    Stage 4 chronic kidney disease    Type 2 diabetes mellitus    Vitamin D deficiency    Carotid artery stenosis    Chronic right-sided thoracic back pain    Acute pain of left shoulder    Peripheral vascular disease of lower extremity    Edema    Ex-smoker    Peripheral vascular disease    De Quervain's tenosynovitis    Arthritis of carpometacarpal (CMC) joint of right thumb    Arthritis of right hand    Steal syndrome of dialysis vascular access    Anemia of chronic renal failure, stage 4 (severe)    Anemia of chronic disease    Aortic stenosis, moderate    Hematoma    ESRD (end stage renal disease)     Past Medical History:   Diagnosis Date    Allergic rhinitis     Anemia     Arthritis     Asthma     USE INHALERS AND NEBULIZERS    Back pain     Bladder disorder     Cancer     LEFT LUNG CANCER  SURGERY AND CHEMO DONE  AND CURRENTLY   RIGHT LUNG CANCER HAS ONLY RECEIVED RADIATION THUS FAR    Chronic kidney disease     stage 3    CKD (chronic kidney disease) stage 4, GFR 15-29 ml/min     Condition not found     ulcer    Congestive heart failure (CHF)     FOLLOWED BY DR AQUINO. DENIES CP BUT DOES HAVE SOA CHRONIC ISSUE COPD/LUNG CANCER    COPD (chronic obstructive pulmonary disease)     FOLLOWED BY DR MARIAJOSE BAILEY    Coronary artery disease     DENIES CP BUT DOES GET SOA MOST OF THE TIME WITH EXERTION BUT OCC AT REST CHRONIC ISSUE  COPD/LUNG CANCER    Deep vein thrombosis     Diabetes mellitus     DOES NOT CHECK BS DAILY    Disease of thyroid gland     HYPOTHYROIDISM    Essential hypertension     Gastric ulcer     GERD (gastroesophageal reflux disease)     Heart murmur     History of transfusion     NO ISSUES POST TRANSFUSION WAS MANY YEARS AGO    Hyperlipemia     Leukocytopenia     FOLLOWED BY DR MIR BAILEY    Limb swelling     Lumbago     Lumbar spinal stenosis     Lung cancer     Migraine headache     Multiple joint pain     On home oxygen therapy     3L/NC PRN    Osteopenia     Reflux esophagitis     Shortness of breath     Thyroid nodule     HAS ULTRASOUND YEARLY BEING MONITORED    Vascular disease     Vitamin D deficiency      Past Surgical History:   Procedure Laterality Date    ABDOMINAL SURGERY      APPENDECTOMY      ARTERIOVENOUS FISTULA/SHUNT SURGERY Left 05/10/2022    Procedure: Left basilic vein transposition;  Surgeon: Wan Barksdale MD;  Location: [facility] MAIN OR;  Service: Vascular;  Laterality: Left;    ARTERIOVENOUS FISTULA/SHUNT SURGERY Left 3/23/2023    Procedure: Ligation of left arm arteriovenous fistula;  Surgeon: Wan Barksdale MD;  Location: [facility] MAIN OR;  Service: Vascular;  Laterality: Left;    ARTERIOVENOUS FISTULA/SHUNT SURGERY Left 11/30/2023    Procedure: Creation of left arm arteriovenous graft;  Surgeon: Wan Barksdale MD;  Location: [facility] OR;  Service: Vascular;  Laterality: Left;    CARDIAC CATHETERIZATION  1996    CARDIAC SURGERY      CARDIAC SURGERY      fluid drained from heart    CATARACT EXTRACTION, BILATERAL  2003    COLONOSCOPY  2014    ENDOSCOPY  2016    2019    FEMORAL ARTERY STENT Bilateral     HYSTERECTOMY      LUNG BIOPSY Left 2005    lobectomy upper lung caner    LUNG VOLUME REDUCTION      OTHER SURGICAL HISTORY      artifical joints/limbs    REPLACEMENT TOTAL KNEE Left 2016    UPPER GASTROINTESTINAL ENDOSCOPY        General Information       Row Name 03/05/24 0834          Physical  Therapy Time and Intention    Document Type evaluation  -MG     Mode of Treatment individual therapy;physical therapy  -MG       Row Name 03/05/24 0834          General Information    Patient Profile Reviewed yes  -MG     Prior Level of Function independent:  No AD inside the house. RW outside of the house.  -MG     Existing Precautions/Restrictions fall;oxygen therapy device and L/min  Currently on 3L O2 via NC. Wears 3L O2 at home only at night or if hot/humid during the day.  -MG     Barriers to Rehab none identified  -MG       Row Name 03/05/24 0834          Living Environment    People in Home spouse  Daughter lives next door  -MG       Row Name 03/05/24 0834          Home Main Entrance    Number of Stairs, Main Entrance three  -MG     Stair Railings, Main Entrance railings safe and in good condition;railings on both sides of stairs  -MG       Row Name 03/05/24 0834          Stairs Within Home, Primary    Stairs, Within Home, Primary Bed/bath on main level. Does have upstairs and basement, but does not go down/up there.  -MG       Row Name 03/05/24 0834          Cognition    Orientation Status (Cognition) oriented x 4  -MG       Row Name 03/05/24 0834          Safety Issues, Functional Mobility    Comment, Safety Issues/Impairments (Mobility) Gait belt and non-skid socks donned.  -MG               User Key  (r) = Recorded By, (t) = Taken By, (c) = Cosigned By      Initials Name Provider Type    MG Divine Rivera, PT Physical Therapist                   Mobility       Row Name 03/05/24 0900          Bed Mobility    Bed Mobility supine-sit  -MG     Supine-Sit [place] (Bed Mobility) supervision  -MG     Assistive Device (Bed Mobility) head of bed elevated  -MG     Comment, (Bed Mobility) Donned shoes via figure-4 with setup.  -MG       Row Name 03/05/24 0900          Sit-Stand Transfer    Sit-Stand [place] (Transfers) standby assist  -MG     Assistive Device (Sit-Stand Transfers) walker, front-wheeled   -MG     Comment, (Sit-Stand Transfer) No dizziness or pain on standing.  -MG       Row Name 03/05/24 0900          Gait/Stairs (Locomotion)    [place] (Gait) standby assist;modified independence  -MG     Assistive Device (Gait) walker, front-wheeled  -MG     Distance in Feet (Gait) 200  -MG     Deviations/Abnormal Patterns (Gait) gait speed decreased  -MG     Bilateral Gait Deviations forward flexed posture  -MG     Comment, (Gait/Stairs) Pt amb in hallway demoing slow, but steady pace w/ RW. Declined trial without RW d/t feeling weaker than her normal. States no dizziness or SOB. Amb on room air. No knee buckling or LOB.  -MG               User Key  (r) = Recorded By, (t) = Taken By, (c) = Cosigned By      Initials Name Provider Type    MG Divine Rivera, PT Physical Therapist                   Obj/Interventions       Row Name 03/05/24 0839          Range of Motion Comprehensive    General Range of Motion bilateral lower extremity ROM WFL  -MG       Row Name 03/05/24 0839          Strength Comprehensive (MMT)    General Manual Muscle Testing (MMT) Assessment lower extremity strength deficits identified  -MG     Comment, General Manual Muscle Testing (MMT) Assessment Generalized weakness.  -MG       Row Name 03/05/24 0839          Balance    Balance Assessment sitting static balance;sitting dynamic balance;standing static balance;standing dynamic balance  -MG     Static Sitting Balance modified independence  -MG     Dynamic Sitting Balance supervision  -MG     Position, Sitting Balance unsupported;sitting edge of bed  -MG     Static Standing Balance standby assist;modified independence  -MG     Dynamic Standing Balance standby assist;modified independence  -MG     Position/Device Used, Standing Balance supported;walker, front-wheeled  -MG     Comment, Balance No knee buckling or LOB.  -MG       Row Name 03/05/24 0839          Sensory Assessment (Somatosensory)    Sensory Assessment (Somatosensory)  "sensation intact  Denies current N/T. States she gets N/T \"once in a while\" in her hands/feet.  -MG               User Key  (r) = Recorded By, (t) = Taken By, (c) = Cosigned By      Initials Name Provider Type    MG Divine Rivera, PT Physical Therapist                   Goals/Plan    No documentation.                  Clinical Impression       Row Name 03/05/24 0837          Pain    Pretreatment Pain Rating 3/10  -MG     Posttreatment Pain Rating 3/10  -MG     Pain Location - Side/Orientation Right  -MG     Pain Location - flank  -MG     Pre/Posttreatment Pain Comment States already having a pain medication this AM.  -MG     Pain Intervention(s) Medication (See MAR);Ambulation/increased activity;Repositioned;Rest  -MG       Row Name 03/05/24 0837          Plan of Care Review    Plan of Care Reviewed With patient  -MG     Progress improving  -MG     Outcome Evaluation Pt is a 88 y/o F who presented to [facility] as a tsf from [facility] with c/o R sided flank pain and burning with urination. Per chart workup revealed a subcapsular R kidney hematoma. Per Urology and Nephrology they are planning for conservative management. Pt reports she is independent at baseline without use of AD inside of her home and uses a RW outside of her home. Pt states she lives with her  in a house with 3 MAGGIE and BHR. Today, pt was supervision for bed mobility, SBA for STS and SBA progressing to Mod-I for ambulation of 200' with a RW. Pt reports feeling weaker than her baseline due to not ambulating much since admission and denies any SOB or dizziness with mobility while on room air. No knee buckling or LOB during the session. Educated pt on activity rec of sitting UIC TID for all meals, ambulating with nsg to the bathroom and ambulating with nsg into the hallway TID; pt v/u. As pt is at her baseline mobility no further skilled PT services are required and will sign-off. Rec home w/ family assist at [place].  -MG       Row Name 03/05/24 0837       "    Therapy Assessment/Plan (PT)    Criteria for Skilled Interventions Met (PT) no;does not meet criteria for skilled intervention  -MG     Therapy Frequency (PT) evaluation only  -MG       Row Name 03/05/24 0837          Vital Signs    O2 Delivery Pre Treatment nasal cannula  -MG     O2 Delivery Intra Treatment room air  -MG     O2 Delivery Post Treatment room air  -MG     Pre Patient Position Supine  -MG     Intra Patient Position Standing  -MG     Post Patient Position Sitting  -MG       Row Name 03/05/24 0837          Positioning and Restraints    Pre-Treatment Position in bed  -MG     Post Treatment Position chair  -MG     In Chair notified nsg;sitting;call light within reach;encouraged to call for assist;exit alarm on  -MG               User Key  (r) = Recorded By, (t) = Taken By, (c) = Cosigned By      Initials Name Provider Type    Divine Umaña PT Physical Therapist                   Outcome Measures       Row Name 03/05/24 0907          How much help from another person do you currently need...    Turning from your back to your side while in flat bed without using bedrails? 4  -MG     Moving from lying on back to sitting on the side of a flat bed without bedrails? 4  -MG     Moving to and from a bed to a chair (including a wheelchair)? 4  -MG     Standing up from a chair using your arms (e.g., wheelchair, bedside chair)? 4  -MG     Climbing 3-5 steps with a railing? 3  -MG     To walk in hospital room? 4  -MG     AM-PAC 6 Clicks Score (PT) 23  -MG     Highest Level of Mobility Goal 7 --> Walk 25 feet or more  -MG               User Key  (r) = Recorded By, (t) = Taken By, (c) = Cosigned By      Initials Name Provider Type    Divine Umaña PT Physical Therapist                                 Physical Therapy Education       Title: PT OT SLP Therapies (In Progress)       Topic: Physical Therapy (In Progress)       Point: Mobility training (Not Started)       Learner Progress:  Not documented in this  visit.              Point: Home exercise program (Not Started)       Learner Progress:  Not documented in this visit.              Point: Body mechanics (Not Started)       Learner Progress:  Not documented in this visit.              Point: Precautions (Done)       Learning Progress Summary             Patient Acceptance, E,TB, VU by MG at 3/5/2024 0908                                         User Key       Initials Effective Dates Name Provider Type Discipline     05/24/22 -  Divine Rivera, PT Physical Therapist PT                  PT Recommendation and Plan     Plan of Care Reviewed With: patient  Progress: improving  Outcome Evaluation: Pt is a 88 y/o F who presented to [facility] as a tsf from [facility] with c/o R sided flank pain and burning with urination. Per chart workup revealed a subcapsular R kidney hematoma. Per Urology and Nephrology they are planning for conservative management. Pt reports she is independent at baseline without use of AD inside of her home and uses a RW outside of her home. Pt states she lives with her  in a house with 3 MAGGIE and BHR. Today, pt was supervision for bed mobility, SBA for STS and SBA progressing to Mod-I for ambulation of 200' with a RW. Pt reports feeling weaker than her baseline due to not ambulating much since admission and denies any SOB or dizziness with mobility while on room air. No knee buckling or LOB during the session. Educated pt on activity rec of sitting UIC TID for all meals, ambulating with nsg to the bathroom and ambulating with nsg into the hallway TID; pt v/u. As pt is at her baseline mobility no further skilled PT services are required and will sign-off. Rec home w/ family assist at [place].     Time Calculation:         PT Charges       Row Name 03/05/24 0908             Time Calculation    Start Time 0831  -MG      Stop Time 0858  -MG      Time Calculation (min) 27 min  -MG      PT Received On 03/05/24  -MG         Time Calculation- PT    Total Timed  Code Minutes- PT 15 minute(s)  -MG                User Key  (r) = Recorded By, (t) = Taken By, (c) = Cosigned By      Initials Name Provider Type    MG Divine Rivera, PT Physical Therapist                  Therapy Charges for Today       Code Description Service Date Service Provider Modifiers Qty    [id number]  PT EVAL MOD COMPLEXITY 3 3/5/2024 Divine Rivera, PT GP 1    [id number]  PT THERAPEUTIC ACT EA 15 MIN 3/5/2024 Divine Rivera, PT GP 1            PT G-Codes  AM-PAC 6 Clicks Score (PT): 23  PT Discharge Summary  Anticipated Discharge Disposition (PT): home with assist    Divine Rivera, PT  3/5/2024

## 2024-03-06 ENCOUNTER — APPOINTMENT (OUTPATIENT)
Dept: CT IMAGING | Facility: HOSPITAL | Age: 89
End: 2024-03-06
Payer: MEDICARE

## 2024-03-06 LAB
ALBUMIN SERPL-MCNC: 3 G/DL (ref 3.5–5.2)
ANION GAP SERPL CALCULATED.3IONS-SCNC: 12.7 MMOL/L (ref 5–15)
BASOPHILS # BLD AUTO: 0.03 10*3/MM3 (ref 0–0.2)
BASOPHILS NFR BLD AUTO: 0.5 % (ref 0–1.5)
BUN SERPL-MCNC: 39 MG/DL (ref 8–23)
BUN/CREAT SERPL: 9.2 (ref 7–25)
CALCIUM SPEC-SCNC: 7.9 MG/DL (ref 8.6–10.5)
CHLORIDE SERPL-SCNC: 98 MMOL/L (ref 98–107)
CO2 SERPL-SCNC: 23.3 MMOL/L (ref 22–29)
CREAT SERPL-MCNC: 4.24 MG/DL (ref 0.57–1)
DEPRECATED RDW RBC AUTO: 44.7 FL (ref 37–54)
EGFRCR SERPLBLD CKD-EPI 2021: 9.7 ML/MIN/1.73
EOSINOPHIL # BLD AUTO: 0.23 10*3/MM3 (ref 0–0.4)
EOSINOPHIL NFR BLD AUTO: 3.7 % (ref 0.3–6.2)
ERYTHROCYTE [DISTWIDTH] IN BLOOD BY AUTOMATED COUNT: 13.1 % (ref 12.3–15.4)
GLUCOSE BLDC GLUCOMTR-MCNC: 149 MG/DL (ref 70–130)
GLUCOSE BLDC GLUCOMTR-MCNC: 175 MG/DL (ref 70–130)
GLUCOSE BLDC GLUCOMTR-MCNC: 92 MG/DL (ref 70–130)
GLUCOSE BLDC GLUCOMTR-MCNC: 95 MG/DL (ref 70–130)
GLUCOSE SERPL-MCNC: 102 MG/DL (ref 65–99)
HCT VFR BLD AUTO: 27.4 % (ref 34–46.6)
HGB BLD-MCNC: 8.7 G/DL (ref 12–15.9)
IMM GRANULOCYTES # BLD AUTO: 0.02 10*3/MM3 (ref 0–0.05)
IMM GRANULOCYTES NFR BLD AUTO: 0.3 % (ref 0–0.5)
LYMPHOCYTES # BLD AUTO: 0.97 10*3/MM3 (ref 0.7–3.1)
LYMPHOCYTES NFR BLD AUTO: 15.7 % (ref 19.6–45.3)
MCH RBC QN AUTO: 29.6 PG (ref 26.6–33)
MCHC RBC AUTO-ENTMCNC: 31.8 G/DL (ref 31.5–35.7)
MCV RBC AUTO: 93.2 FL (ref 79–97)
MONOCYTES # BLD AUTO: 0.92 10*3/MM3 (ref 0.1–0.9)
MONOCYTES NFR BLD AUTO: 14.9 % (ref 5–12)
NEUTROPHILS NFR BLD AUTO: 4.02 10*3/MM3 (ref 1.7–7)
NEUTROPHILS NFR BLD AUTO: 64.9 % (ref 42.7–76)
NRBC BLD AUTO-RTO: 0 /100 WBC (ref 0–0.2)
PHOSPHATE SERPL-MCNC: 4.3 MG/DL (ref 2.5–4.5)
PLATELET # BLD AUTO: 181 10*3/MM3 (ref 140–450)
PMV BLD AUTO: 9 FL (ref 6–12)
POTASSIUM SERPL-SCNC: 4.5 MMOL/L (ref 3.5–5.2)
RBC # BLD AUTO: 2.94 10*6/MM3 (ref 3.77–5.28)
SODIUM SERPL-SCNC: 134 MMOL/L (ref 136–145)
WBC NRBC COR # BLD AUTO: 6.19 10*3/MM3 (ref 3.4–10.8)

## 2024-03-06 PROCEDURE — 94799 UNLISTED PULMONARY SVC/PX: CPT

## 2024-03-06 PROCEDURE — A9270 NON-COVERED ITEM OR SERVICE: HCPCS | Performed by: INTERNAL MEDICINE

## 2024-03-06 PROCEDURE — G0257 UNSCHED DIALYSIS ESRD PT HOS: HCPCS

## 2024-03-06 PROCEDURE — 25010000002 CEFTRIAXONE PER 250 MG: Performed by: INTERNAL MEDICINE

## 2024-03-06 PROCEDURE — 63710000001 INSULIN LISPRO (HUMAN) PER 5 UNITS: Performed by: INTERNAL MEDICINE

## 2024-03-06 PROCEDURE — 63710000001 HYDROCODONE-ACETAMINOPHEN 7.5-325 MG TABLET: Performed by: INTERNAL MEDICINE

## 2024-03-06 PROCEDURE — 63710000001 ROSUVASTATIN 20 MG TABLET: Performed by: INTERNAL MEDICINE

## 2024-03-06 PROCEDURE — 82948 REAGENT STRIP/BLOOD GLUCOSE: CPT

## 2024-03-06 PROCEDURE — G0378 HOSPITAL OBSERVATION PER HR: HCPCS

## 2024-03-06 PROCEDURE — 63710000001 CARVEDILOL 25 MG TABLET: Performed by: INTERNAL MEDICINE

## 2024-03-06 PROCEDURE — 63710000001 POLYCARBOPHIL 625 MG TABLET: Performed by: INTERNAL MEDICINE

## 2024-03-06 PROCEDURE — 63710000001 PANTOPRAZOLE 40 MG TABLET DELAYED-RELEASE: Performed by: INTERNAL MEDICINE

## 2024-03-06 PROCEDURE — 80069 RENAL FUNCTION PANEL: CPT | Performed by: INTERNAL MEDICINE

## 2024-03-06 PROCEDURE — 63710000001 SENNOSIDES-DOCUSATE 8.6-50 MG TABLET: Performed by: INTERNAL MEDICINE

## 2024-03-06 PROCEDURE — 85025 COMPLETE CBC W/AUTO DIFF WBC: CPT | Performed by: INTERNAL MEDICINE

## 2024-03-06 PROCEDURE — 63710000001 SEVELAMER 800 MG TABLET: Performed by: INTERNAL MEDICINE

## 2024-03-06 PROCEDURE — 94761 N-INVAS EAR/PLS OXIMETRY MLT: CPT

## 2024-03-06 PROCEDURE — 94664 DEMO&/EVAL PT USE INHALER: CPT

## 2024-03-06 PROCEDURE — 74176 CT ABD & PELVIS W/O CONTRAST: CPT

## 2024-03-06 RX ADMIN — PANTOPRAZOLE SODIUM 40 MG: 40 TABLET, DELAYED RELEASE ORAL at 06:32

## 2024-03-06 RX ADMIN — PANTOPRAZOLE SODIUM 40 MG: 40 TABLET, DELAYED RELEASE ORAL at 18:17

## 2024-03-06 RX ADMIN — HYDROCODONE BITARTRATE AND ACETAMINOPHEN 1 TABLET: 7.5; 325 TABLET ORAL at 18:17

## 2024-03-06 RX ADMIN — SEVELAMER CARBONATE 800 MG: 800 TABLET, FILM COATED ORAL at 18:17

## 2024-03-06 RX ADMIN — SEVELAMER CARBONATE 800 MG: 800 TABLET, FILM COATED ORAL at 09:33

## 2024-03-06 RX ADMIN — CARVEDILOL 25 MG: 25 TABLET, FILM COATED ORAL at 20:06

## 2024-03-06 RX ADMIN — LIDOCAINE 4% 1 APPLICATION: 4 CREAM TOPICAL at 12:34

## 2024-03-06 RX ADMIN — CEFTRIAXONE SODIUM 1000 MG: 1 INJECTION, POWDER, FOR SOLUTION INTRAMUSCULAR; INTRAVENOUS at 19:57

## 2024-03-06 RX ADMIN — DOCUSATE SODIUM 50MG AND SENNOSIDES 8.6MG 2 TABLET: 8.6; 5 TABLET, FILM COATED ORAL at 18:17

## 2024-03-06 RX ADMIN — INSULIN LISPRO 2 UNITS: 100 INJECTION, SOLUTION INTRAVENOUS; SUBCUTANEOUS at 21:50

## 2024-03-06 RX ADMIN — HYDROCODONE BITARTRATE AND ACETAMINOPHEN 1 TABLET: 7.5; 325 TABLET ORAL at 06:35

## 2024-03-06 RX ADMIN — ROSUVASTATIN CALCIUM 20 MG: 20 TABLET, FILM COATED ORAL at 20:05

## 2024-03-06 RX ADMIN — BUDESONIDE AND FORMOTEROL FUMARATE DIHYDRATE 2 PUFF: 160; 4.5 AEROSOL RESPIRATORY (INHALATION) at 08:09

## 2024-03-06 RX ADMIN — BUDESONIDE AND FORMOTEROL FUMARATE DIHYDRATE 2 PUFF: 160; 4.5 AEROSOL RESPIRATORY (INHALATION) at 20:07

## 2024-03-06 RX ADMIN — CALCIUM POLYCARBOPHIL 625 MG: 625 TABLET, FILM COATED ORAL at 09:33

## 2024-03-06 NOTE — PROGRESS NOTES
Name: Charlette Rai ADMIT: 3/3/2024   : 1935  PCP: Rafael Lyons MD    MRN: [id number] LOS: 0 days   AGE/SEX: 89 y.o. female  ROOM: [facility]     Subjective   Subjective   Pleasant patient, resting in recliner.  Repeat CT scan to evaluate hematoma is pending and patient will receive dialysis sometime today.  She otherwise has no complaints, she reports that she lives over an hour away and will need some heads up about when she will leave       Objective   Objective   Vital Signs  Temp:  [97.2 °F (36.2 °C)-97.3 °F (36.3 °C)] 97.3 °F (36.3 °C)  Heart Rate:  [72-76] 74  Resp:  [16-18] 18  BP: (141-164)/(56-67) 141/59  SpO2:  [95 %-100 %] 96 %  on  Flow (L/min):  [3] 3;   Device (Oxygen Therapy): room air  There is no height or weight on file to calculate BMI.  Physical Exam  Constitutional:       General: She is not in acute distress.     Appearance: She is ill-appearing (chronically).   Cardiovascular:      Rate and Rhythm: Normal rate and regular rhythm.   Pulmonary:      Effort: Pulmonary effort is normal. No respiratory distress.      Breath sounds: Normal breath sounds. No wheezing.   Abdominal:      General: Abdomen is flat. Bowel sounds are normal.      Palpations: Abdomen is soft.      Tenderness: There is no abdominal tenderness.   Musculoskeletal:         General: No swelling or tenderness.      Right lower leg: No edema.      Left lower leg: No edema.      Comments: LUE AVF   Skin:     General: Skin is warm and dry.   Neurological:      General: No focal deficit present.      Mental Status: She is alert and oriented to person, place, and time. Mental status is at baseline.         Results Review     I reviewed the patient's new clinical results.  Results from last 7 days   Lab Units 24  0451 24  0621 24  0511 24  0953   WBC 10*3/mm3 6.19 6.89 7.09 10.28   HEMOGLOBIN g/dL 8.7* 9.5* 8.7* 9.7*   PLATELETS 10*3/mm3 181 178 164 177     Results from last 7 days   Lab Units  03/06/24 0451 03/05/24 0621 03/04/24  0511 03/03/24  0953   SODIUM mmol/L 134* 139 138 138   POTASSIUM mmol/L 4.5 4.4 4.5 4.0   CHLORIDE mmol/L 98 102 101 101   CO2 mmol/L 23.3 29.0 25.0 26.6   BUN mg/dL 39* 26* 49* 42*   CREATININE mg/dL 4.24* 3.12* 4.54* 3.72*   GLUCOSE mg/dL 102* 101* 116* 204*   Estimated Creatinine Clearance: 10 mL/min (A) (by C-G formula based on SCr of 4.24 mg/dL (H)).  Results from last 7 days   Lab Units 03/06/24 0451 03/05/24 0621 03/04/24 0511 03/03/24  0953   ALBUMIN g/dL 3.0* 3.5 3.2* 3.6   BILIRUBIN mg/dL  --   --   --  0.2   ALK PHOS U/L  --   --   --  199*   AST (SGOT) U/L  --   --   --  11   ALT (SGPT) U/L  --   --   --  9     Results from last 7 days   Lab Units 03/06/24 0451 03/05/24 0621 03/04/24 0511 03/03/24  0953   CALCIUM mg/dL 7.9* 8.4* 8.1* 8.2*   ALBUMIN g/dL 3.0* 3.5 3.2* 3.6   PHOSPHORUS mg/dL 4.3 4.1 4.3  --      Results from last 7 days   Lab Units 03/03/24 2027   LACTATE mmol/L 1.4     COVID19   Date Value Ref Range Status   11/09/2023 Not Detected Not Detected - Ref. Range Final   10/29/2023 Not Detected Not Detected - Ref. Range Final     Glucose   Date/Time Value Ref Range Status   03/06/2024 0551 95 70 - 130 mg/dL Final   03/05/2024 2113 132 (H) 70 - 130 mg/dL Final   03/05/2024 2049 133 (H) 70 - 130 mg/dL Final   03/05/2024 1703 92 70 - 130 mg/dL Final   03/05/2024 1138 174 (H) 70 - 130 mg/dL Final   03/05/2024 0610 93 70 - 130 mg/dL Final   03/04/2024 2140 165 (H) 70 - 130 mg/dL Final       CT Abdomen Pelvis Without Contrast  Narrative: PROCEDURE: CT ABDOMEN PELVIS WO CONTRAST     COMPARISON: [facility], CT, CT CHEST WO CONTRAST DIAGNOSTIC, 11/28/2023, 12:16.     INDICATIONS: RLQ AND RIGHT FLANK PAIN, NAUSEA, VOMTING, DIARRHEA     TECHNIQUE: CT images were created without intravenous contrast.       PROTOCOL:   Standard imaging protocol performed      RADIATION:   DLP: 409.3 mGy*cm    Automated exposure control was utilized to minimize  radiation dose.      FINDINGS:         Lung bases:  Pleural thickening with calcification posteriorly and at the base of the left   hemidiaphragm with scarring atelectasis in volume loss noted.  Right lung demonstrates minimal   pleural thickening subpleural changes.  There is improved aeration with some persistent pleural   thickening at the right base.     No free air is noted below the diaphragm.     Organs:  Increased subcapsular density and enlargement of the right kidney with associated   perinephric stranding noted.  Findings suggest a mixed ages subcapsular hematoma measuring at least   3.1 cm in thickness in the mid and inferior portions with abnormality extending from the superior   pole to the inferior pole.  Additional underlying high and low-density lesions likely high and   low-density cysts are noted.  There is no definite hydronephrosis noted.  No obvious calculi are   noted along the visualized or expected course of the ureters.  Several high density lesions are   noted within the left kidney compatible with proteinaceous or hemorrhagic cyst.  No hydronephrosis   noted.  No calculi along the expected course of the left ureter.     Stones are noted layering dependently within the gallbladder.  No acute inflammatory changes   associated with gallbladder noted.  The liver, spleen, pancreas and adrenal glands are grossly   unremarkable in appearance     GI tract:  Postsurgical changes are noted at the GE junction.  There is a small hiatal hernia.  The   stomach is otherwise grossly unremarkable in appearance on motion limited imaging.  Small bowel   demonstrates no evidence of obstruction.  No suspicious mesenteric adenopathy or fluid collection   is noted.  There is a heavy stool burden.  There is no acute abnormality appreciated of the colon.     Pelvis:  The bladder is decompressed and grossly unremarkable in appearance.  Ovaries are not well   visualized.  Patient appears to be status post  hysterectomy     Retroperitoneum:  Atherosclerotic changes are noted of the aorta which is normal in caliber.  There   appear to be iliac stents bilaterally.  No suspicious retroperitoneal adenopathy     Bones and soft tissues:  Fat containing left inguinal hernia.  Multilevel degenerative changes   noted of the spine     Impression:    1. Increased density surrounding the right kidney compatible with a subcapsular hematoma.  There is   no evidence of obstructive uropathy  2. There is a heavy stool burden.  GI tract demonstrates no definite focal acute inflammatory   change  3. Cholelithiasis  4. Distal findings as above        Findings discussed with the ordering provider at the time interpretation          SISI VENEGAS MD         Electronically Signed and Approved By: SISI VENEGAS MD on 3/03/2024 at 11:49                     Scheduled Medications  budesonide-formoterol, 2 puff, Inhalation, BID - RT  carvedilol, 25 mg, Oral, Q12H  cefTRIAXone, 1,000 mg, Intravenous, Q24H  hydrALAZINE, 25 mg, Oral, Q12H  insulin lispro, 2-7 Units, Subcutaneous, 4x Daily AC & at Bedtime  pantoprazole, 40 mg, Oral, BID AC  polycarbophil, 625 mg, Oral, Daily  rosuvastatin, 20 mg, Oral, Nightly  sevelamer, 800 mg, Oral, TID With Meals    Infusions   Diet  Diet: Regular/House; Fluid Consistency: Thin (IDDSI 0)         I have personally reviewed:  [x]  Laboratory   []  Microbiology   [x]  Radiology   [x]  EKG/Telemetry   []  Cardiology/Vascular   []  Pathology   []  Records    Assessment/Plan     Active Hospital Problems    Diagnosis  POA    **Hematoma [T14.8XXA]  Yes    ESRD (end stage renal disease) [N18.6]  Unknown    Essential hypertension [I10]  Yes    Type 2 diabetes mellitus [E11.9]  Unknown      Resolved Hospital Problems   No resolved problems to display.       89 y.o. female admitted with Hematoma.    Subcapsular hematoma, right  -Hemoglobin is stable, continue with conservative management   -Repeat CT scan  pending    ESRD on HD  Anemia of chronic disease  Metabolic bone disease  -Nephrology consulted  -Dialysis today  -Continue Phos binders    Type 2 diabetes  -Continue sliding scale insulin; adjust as needed  -A1c 10/23 7.8%    Hypertension  -Continue Coreg, hydralazine    SCDs for DVT prophylaxis.  Full code.  Discussed with patient.  Anticipate discharge  tbd   today or tomorrow depending on CT scan/HD      Emily Chandra MD  [facility]  03/06/24  10:30 EST    I wore protective equipment throughout this patient encounter including gloves.  Hand hygiene was performed before donning protective equipment and after removal when leaving the room.    Expected Discharge Date and Time       Expected Discharge Date Expected Discharge Time    Mar 6, 2024              Copied text in this note has been reviewed and is accurate as of 03/06/24.

## 2024-03-06 NOTE — NURSING NOTE
HD complete, 400 ml removed. UF goal not met due to patient cramping. Post /68 HR 88 T 97.5. verbal report given.

## 2024-03-06 NOTE — PROGRESS NOTES
[facility] Progress Note      Patient Name: Charlette Rai  : 1935  MRN: [id number]  Primary Care Physician:  Rafael Lyons MD  Date of admission: 3/3/2024    Subjective     Interval History:   Follow-up end-stage renal disease     Patient is feeling somewhat better her right flank pain has improved, and she will be observed for her subcapsular right kidney hematoma, her hemoglobin is stable and she is monitored by urology    Review of Systems:   As noted above    Objective     Vitals:   Temp:  [97.2 °F (36.2 °C)-97.4 °F (36.3 °C)] 97.3 °F (36.3 °C)  Heart Rate:  [72-96] 74  Resp:  [16-18] 18  BP: (138-164)/(56-67) 141/59  Flow (L/min):  [3] 3    Intake/Output Summary (Last 24 hours) at 3/6/2024 0853  Last data filed at 3/6/2024 0738  Gross per 24 hour   Intake 460 ml   Output 200 ml   Net 260 ml       Physical Exam:    General Appearance: Awake, alert, chronically ill, no acute  Skin: warm and dry  HEENT: oral mucosa normal, nonicteric sclera  Neck: supple, no JVD  Lungs: CTA, unlabored breathing effort  Heart: RRR, normal S1 and S2, no rub  Abdomen: soft, nontender, nondistended  : no palpable bladder, she had mild right CVA tenderness  Extremities: no edema, cyanosis or clubbing, functional AV graft in the left upper arm    Scheduled Meds:     budesonide-formoterol, 2 puff, Inhalation, BID - RT  carvedilol, 25 mg, Oral, Q12H  cefTRIAXone, 1,000 mg, Intravenous, Q24H  hydrALAZINE, 25 mg, Oral, Q12H  insulin lispro, 2-7 Units, Subcutaneous, 4x Daily AC & at Bedtime  pantoprazole, 40 mg, Oral, BID AC  polycarbophil, 625 mg, Oral, Daily  rosuvastatin, 20 mg, Oral, Nightly  sevelamer, 800 mg, Oral, TID With Meals      IV Meds:        Results Reviewed:   I have personally reviewed the results from the time of this admission to 3/6/2024 08:53 EST     Results from last 7 days   Lab Units 24  0451 24  0621 24  0511 24  0953   SODIUM mmol/L 134* 139 138 138    POTASSIUM mmol/L 4.5 4.4 4.5 4.0   CHLORIDE mmol/L 98 102 101 101   CO2 mmol/L 23.3 29.0 25.0 26.6   BUN mg/dL 39* 26* 49* 42*   CREATININE mg/dL 4.24* 3.12* 4.54* 3.72*   CALCIUM mg/dL 7.9* 8.4* 8.1* 8.2*   BILIRUBIN mg/dL  --   --   --  0.2   ALK PHOS U/L  --   --   --  199*   ALT (SGPT) U/L  --   --   --  9   AST (SGOT) U/L  --   --   --  11   GLUCOSE mg/dL 102* 101* 116* 204*       Estimated Creatinine Clearance: 10 mL/min (A) (by C-G formula based on SCr of 4.24 mg/dL (H)).    Results from last 7 days   Lab Units 03/06/24  0451 03/05/24  0621 03/04/24  0511   PHOSPHORUS mg/dL 4.3 4.1 4.3             Results from last 7 days   Lab Units 03/06/24  0451 03/05/24  0621 03/04/24  0511 03/03/24  0953   WBC 10*3/mm3 6.19 6.89 7.09 10.28   HEMOGLOBIN g/dL 8.7* 9.5* 8.7* 9.7*   PLATELETS 10*3/mm3 181 178 164 177             Assessment / Plan     ASSESSMENT:  End-stage renal disease on maintenance hemodialysis every Monday, Wednesday and Friday under the care of my partner Dr. García in [place], she appears to be euvolemic, her electrolyte within acceptable range.  She appears to be euvolemic and her electrolytes within acceptable range other than sodium 134.  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  No indication for for IR embolization at this time that we will continue to be as an option if there is any worsening in the bleed.  Anemia of chronic kidney disease hemoglobin today is slightly lower 8.7 but relatively speaking stable for the past 48 hours, will continue to monitor  Metabolic bone disease treated with phosphate binder    PLAN:  Continue the same treatment  Hemodialysis today  Surveillance labs    I reviewed the chart and other providers notes, I reviewed labs.  I discussed the  case with the patient and she voiced good understanding  Copied text in this note has been reviewed and is accurate as of 03/06/24.       Thank you for involving us in the care of Charlette Rai.  Please feel free to call with any questions.    Matt Menendez MD  03/06/24  08:53 [facility]    [facility]  [phone]    Please note that portions of this note were completed with a voice recognition program.

## 2024-03-07 LAB
ALBUMIN SERPL-MCNC: 3.1 G/DL (ref 3.5–5.2)
ANION GAP SERPL CALCULATED.3IONS-SCNC: 7 MMOL/L (ref 5–15)
BASOPHILS # BLD AUTO: 0.03 10*3/MM3 (ref 0–0.2)
BASOPHILS NFR BLD AUTO: 0.5 % (ref 0–1.5)
BUN SERPL-MCNC: 19 MG/DL (ref 8–23)
BUN/CREAT SERPL: 7 (ref 7–25)
CALCIUM SPEC-SCNC: 8.1 MG/DL (ref 8.6–10.5)
CHLORIDE SERPL-SCNC: 102 MMOL/L (ref 98–107)
CO2 SERPL-SCNC: 25 MMOL/L (ref 22–29)
CREAT SERPL-MCNC: 2.7 MG/DL (ref 0.57–1)
DEPRECATED RDW RBC AUTO: 46 FL (ref 37–54)
EGFRCR SERPLBLD CKD-EPI 2021: 16.6 ML/MIN/1.73
EOSINOPHIL # BLD AUTO: 0.24 10*3/MM3 (ref 0–0.4)
EOSINOPHIL NFR BLD AUTO: 4 % (ref 0.3–6.2)
ERYTHROCYTE [DISTWIDTH] IN BLOOD BY AUTOMATED COUNT: 13.4 % (ref 12.3–15.4)
GLUCOSE BLDC GLUCOMTR-MCNC: 102 MG/DL (ref 70–130)
GLUCOSE BLDC GLUCOMTR-MCNC: 104 MG/DL (ref 70–130)
GLUCOSE BLDC GLUCOMTR-MCNC: 124 MG/DL (ref 70–130)
GLUCOSE BLDC GLUCOMTR-MCNC: 128 MG/DL (ref 70–130)
GLUCOSE SERPL-MCNC: 94 MG/DL (ref 65–99)
HCT VFR BLD AUTO: 26.3 % (ref 34–46.6)
HGB BLD-MCNC: 8.6 G/DL (ref 12–15.9)
IMM GRANULOCYTES # BLD AUTO: 0.02 10*3/MM3 (ref 0–0.05)
IMM GRANULOCYTES NFR BLD AUTO: 0.3 % (ref 0–0.5)
LYMPHOCYTES # BLD AUTO: 0.91 10*3/MM3 (ref 0.7–3.1)
LYMPHOCYTES NFR BLD AUTO: 15.2 % (ref 19.6–45.3)
MCH RBC QN AUTO: 31.4 PG (ref 26.6–33)
MCHC RBC AUTO-ENTMCNC: 32.7 G/DL (ref 31.5–35.7)
MCV RBC AUTO: 96 FL (ref 79–97)
MONOCYTES # BLD AUTO: 0.98 10*3/MM3 (ref 0.1–0.9)
MONOCYTES NFR BLD AUTO: 16.3 % (ref 5–12)
NEUTROPHILS NFR BLD AUTO: 3.82 10*3/MM3 (ref 1.7–7)
NEUTROPHILS NFR BLD AUTO: 63.7 % (ref 42.7–76)
NRBC BLD AUTO-RTO: 0 /100 WBC (ref 0–0.2)
PHOSPHATE SERPL-MCNC: 3.6 MG/DL (ref 2.5–4.5)
PLATELET # BLD AUTO: 164 10*3/MM3 (ref 140–450)
PMV BLD AUTO: 9.2 FL (ref 6–12)
POTASSIUM SERPL-SCNC: 4 MMOL/L (ref 3.5–5.2)
RBC # BLD AUTO: 2.74 10*6/MM3 (ref 3.77–5.28)
SODIUM SERPL-SCNC: 134 MMOL/L (ref 136–145)
WBC NRBC COR # BLD AUTO: 6 10*3/MM3 (ref 3.4–10.8)

## 2024-03-07 PROCEDURE — 63710000001 CARVEDILOL 25 MG TABLET: Performed by: INTERNAL MEDICINE

## 2024-03-07 PROCEDURE — 94664 DEMO&/EVAL PT USE INHALER: CPT

## 2024-03-07 PROCEDURE — 82948 REAGENT STRIP/BLOOD GLUCOSE: CPT

## 2024-03-07 PROCEDURE — 63710000001 BISACODYL 5 MG TABLET DELAYED-RELEASE: Performed by: INTERNAL MEDICINE

## 2024-03-07 PROCEDURE — 63710000001 ROSUVASTATIN 20 MG TABLET: Performed by: INTERNAL MEDICINE

## 2024-03-07 PROCEDURE — A9270 NON-COVERED ITEM OR SERVICE: HCPCS | Performed by: INTERNAL MEDICINE

## 2024-03-07 PROCEDURE — 80069 RENAL FUNCTION PANEL: CPT | Performed by: INTERNAL MEDICINE

## 2024-03-07 PROCEDURE — 85025 COMPLETE CBC W/AUTO DIFF WBC: CPT | Performed by: INTERNAL MEDICINE

## 2024-03-07 PROCEDURE — 94799 UNLISTED PULMONARY SVC/PX: CPT

## 2024-03-07 PROCEDURE — G0378 HOSPITAL OBSERVATION PER HR: HCPCS

## 2024-03-07 PROCEDURE — 63710000001 SEVELAMER 800 MG TABLET: Performed by: INTERNAL MEDICINE

## 2024-03-07 PROCEDURE — 63710000001 PANTOPRAZOLE 40 MG TABLET DELAYED-RELEASE: Performed by: INTERNAL MEDICINE

## 2024-03-07 PROCEDURE — 63710000001 POLYCARBOPHIL 625 MG TABLET: Performed by: INTERNAL MEDICINE

## 2024-03-07 PROCEDURE — 94761 N-INVAS EAR/PLS OXIMETRY MLT: CPT

## 2024-03-07 PROCEDURE — 63710000001 ACETAMINOPHEN 325 MG TABLET: Performed by: INTERNAL MEDICINE

## 2024-03-07 PROCEDURE — 63710000001 POLYETHYLENE GLYCOL 17 G PACK: Performed by: INTERNAL MEDICINE

## 2024-03-07 PROCEDURE — 63710000001 HYDROCODONE-ACETAMINOPHEN 7.5-325 MG TABLET: Performed by: INTERNAL MEDICINE

## 2024-03-07 PROCEDURE — 63710000001 HYDRALAZINE 25 MG TABLET: Performed by: INTERNAL MEDICINE

## 2024-03-07 PROCEDURE — 25010000002 CEFTRIAXONE PER 250 MG: Performed by: INTERNAL MEDICINE

## 2024-03-07 RX ADMIN — POLYETHYLENE GLYCOL 3350 17 G: 17 POWDER, FOR SOLUTION ORAL at 06:30

## 2024-03-07 RX ADMIN — BISACODYL 5 MG: 5 TABLET, COATED ORAL at 18:53

## 2024-03-07 RX ADMIN — CEFTRIAXONE SODIUM 1000 MG: 1 INJECTION, POWDER, FOR SOLUTION INTRAMUSCULAR; INTRAVENOUS at 20:04

## 2024-03-07 RX ADMIN — ACETAMINOPHEN 325MG 650 MG: 325 TABLET ORAL at 20:19

## 2024-03-07 RX ADMIN — HYDROCODONE BITARTRATE AND ACETAMINOPHEN 1 TABLET: 7.5; 325 TABLET ORAL at 00:44

## 2024-03-07 RX ADMIN — PANTOPRAZOLE SODIUM 40 MG: 40 TABLET, DELAYED RELEASE ORAL at 06:30

## 2024-03-07 RX ADMIN — CALCIUM POLYCARBOPHIL 625 MG: 625 TABLET, FILM COATED ORAL at 09:04

## 2024-03-07 RX ADMIN — ROSUVASTATIN CALCIUM 20 MG: 20 TABLET, FILM COATED ORAL at 20:04

## 2024-03-07 RX ADMIN — SEVELAMER CARBONATE 800 MG: 800 TABLET, FILM COATED ORAL at 09:04

## 2024-03-07 RX ADMIN — BUDESONIDE AND FORMOTEROL FUMARATE DIHYDRATE 2 PUFF: 160; 4.5 AEROSOL RESPIRATORY (INHALATION) at 20:06

## 2024-03-07 RX ADMIN — HYDROCODONE BITARTRATE AND ACETAMINOPHEN 1 TABLET: 7.5; 325 TABLET ORAL at 09:08

## 2024-03-07 RX ADMIN — HYDROCODONE BITARTRATE AND ACETAMINOPHEN 1 TABLET: 7.5; 325 TABLET ORAL at 17:55

## 2024-03-07 RX ADMIN — BUDESONIDE AND FORMOTEROL FUMARATE DIHYDRATE 2 PUFF: 160; 4.5 AEROSOL RESPIRATORY (INHALATION) at 08:50

## 2024-03-07 RX ADMIN — SEVELAMER CARBONATE 800 MG: 800 TABLET, FILM COATED ORAL at 17:55

## 2024-03-07 RX ADMIN — PANTOPRAZOLE SODIUM 40 MG: 40 TABLET, DELAYED RELEASE ORAL at 17:55

## 2024-03-07 RX ADMIN — CARVEDILOL 25 MG: 25 TABLET, FILM COATED ORAL at 09:04

## 2024-03-07 RX ADMIN — CARVEDILOL 25 MG: 25 TABLET, FILM COATED ORAL at 20:04

## 2024-03-07 RX ADMIN — HYDRALAZINE HYDROCHLORIDE 25 MG: 25 TABLET ORAL at 09:03

## 2024-03-07 RX ADMIN — SEVELAMER CARBONATE 800 MG: 800 TABLET, FILM COATED ORAL at 12:30

## 2024-03-07 NOTE — PROGRESS NOTES
Continued Stay Note  [facility]     Patient Name: Charlette Rai  MRN: [id number]  Today's Date: 3/7/2024    Admit Date: 3/3/2024    Plan: Home and continue with HD at [facility] in [place] on MWF   Discharge Plan       Row Name 03/07/24 1431       Plan    Plan Home and continue with HD at [facility] in [place] on [facility]    Plan Comments CCP is continuing to follow for poss needs/equipment.                   Discharge Codes    No documentation.                 Expected Discharge Date and Time       Expected Discharge Date Expected Discharge Time    Mar 8, 2024               Sanjuana Dsouza RN

## 2024-03-07 NOTE — PROGRESS NOTES
LOS: 0 days   Patient Care Team:  Rafael Lyons MD as PCP - General (Internal Medicine)  Regis Mckeon MD as Consulting Physician (Radiation Oncology)  Carlton Casillas MD as Consulting Physician (Gastroenterology)  Susan Marcos APRN as Nurse Practitioner (Gastroenterology)      Subjective   Interval History: Right flank pain improving.    Objective     ROS   12 POINT NEG ROS PERTINENT IN HPI      Vital Signs  Temp:  [96.9 °F (36.1 °C)-98.1 °F (36.7 °C)] 97.2 °F (36.2 °C)  Heart Rate:  [81-95] 81  Resp:  [16] 16  BP: (149-182)/(55-71) 167/61      Intake/Output Summary (Last 24 hours) at 3/7/2024 1303  Last data filed at 3/6/2024 1957  Gross per 24 hour   Intake 100 ml   Output 400 ml   Net -300 ml       Flowsheet Rows      Flowsheet Row First Filed Value   Admission Height --   Admission Weight 68.1 kg (150 lb 2.1 oz) Documented at 03/06/2024 1751            Physical Exam:     General appearance: alert, cooperative, oriented      Lab Results (all)       Procedure Component Value Units Date/Time    POC Glucose Once [[id number]]  (Normal) Collected: 03/07/24 1106    Specimen: Blood Updated: 03/07/24 1108     Glucose 102 mg/dL     Renal Function Panel [[id number]]  (Abnormal) Collected: 03/07/24 0705    Specimen: Blood Updated: 03/07/24 0813     Glucose 94 mg/dL      BUN 19 mg/dL      Creatinine 2.70 mg/dL      Sodium 134 mmol/L      Potassium 4.0 mmol/L      Chloride 102 mmol/L      CO2 25.0 mmol/L      Calcium 8.1 mg/dL      Albumin 3.1 g/dL      Phosphorus 3.6 mg/dL      Anion Gap 7.0 mmol/L      BUN/Creatinine Ratio 7.0     eGFR 16.6 mL/min/1.73     Narrative:      GFR Normal >60  Chronic Kidney Disease <60  Kidney Failure <15    The GFR formula is only valid for adults with stable renal function between ages 18 and 70.    CBC & Differential [[id number]]  (Abnormal) Collected: 03/07/24 0705    Specimen: Blood Updated: 03/07/24 0737    Narrative:      The following orders were created for panel  order CBC & Differential.  Procedure                               Abnormality         Status                     ---------                               -----------         ------                     CBC Auto Differential[[id number]]        Abnormal            Final result                 Please view results for these tests on the individual orders.    CBC Auto Differential [[id number]]  (Abnormal) Collected: 03/07/24 0705    Specimen: Blood Updated: 03/07/24 0737     WBC 6.00 10*3/mm3      RBC 2.74 10*6/mm3      Hemoglobin 8.6 g/dL      Hematocrit 26.3 %      MCV 96.0 fL      MCH 31.4 pg      MCHC 32.7 g/dL      RDW 13.4 %      RDW-SD 46.0 fl      MPV 9.2 fL      Platelets 164 10*3/mm3      Neutrophil % 63.7 %      Lymphocyte % 15.2 %      Monocyte % 16.3 %      Eosinophil % 4.0 %      Basophil % 0.5 %      Immature Grans % 0.3 %      Neutrophils, Absolute 3.82 10*3/mm3      Lymphocytes, Absolute 0.91 10*3/mm3      Monocytes, Absolute 0.98 10*3/mm3      Eosinophils, Absolute 0.24 10*3/mm3      Basophils, Absolute 0.03 10*3/mm3      Immature Grans, Absolute 0.02 10*3/mm3      nRBC 0.0 /100 WBC     POC Glucose Once [[id number]]  (Normal) Collected: 03/07/24 0616    Specimen: Blood Updated: 03/07/24 0617     Glucose 104 mg/dL     POC Glucose Once [[id number]]  (Abnormal) Collected: 03/06/24 2130    Specimen: Blood Updated: 03/06/24 2132     Glucose 175 mg/dL     Blood Culture - Blood, Arm, Right [[id number]]  (Normal) Collected: 03/03/24 2027    Specimen: Blood from Arm, Right Updated: 03/06/24 2116     Blood Culture No growth at 3 days    Blood Culture - Blood, Arm, Left [[id number]]  (Normal) Collected: 03/03/24 2040    Specimen: Blood from Arm, Left Updated: 03/06/24 2116     Blood Culture No growth at 3 days    POC Glucose Once [[id number]]  (Normal) Collected: 03/06/24 1829    Specimen: Blood Updated: 03/06/24 1830     Glucose 92 mg/dL     POC Glucose Once [[id number]]  (Abnormal) Collected: 03/06/24 1057    Specimen:  Blood Updated: 03/06/24 1058     Glucose 149 mg/dL     Renal Function Panel [[id number]]  (Abnormal) Collected: 03/06/24 0451    Specimen: Blood Updated: 03/06/24 0613     Glucose 102 mg/dL      BUN 39 mg/dL      Creatinine 4.24 mg/dL      Sodium 134 mmol/L      Potassium 4.5 mmol/L      Chloride 98 mmol/L      CO2 23.3 mmol/L      Calcium 7.9 mg/dL      Albumin 3.0 g/dL      Phosphorus 4.3 mg/dL      Anion Gap 12.7 mmol/L      BUN/Creatinine Ratio 9.2     eGFR 9.7 mL/min/1.73      Comment: <15 Indicative of kidney failure       Narrative:      GFR Normal >60  Chronic Kidney Disease <60  Kidney Failure <15    The GFR formula is only valid for adults with stable renal function between ages 18 and 70.    POC Glucose Once [[id number]]  (Normal) Collected: 03/06/24 0551    Specimen: Blood Updated: 03/06/24 0553     Glucose 95 mg/dL     CBC & Differential [[id number]]  (Abnormal) Collected: 03/06/24 0451    Specimen: Blood Updated: 03/06/24 0529    Narrative:      The following orders were created for panel order CBC & Differential.  Procedure                               Abnormality         Status                     ---------                               -----------         ------                     CBC Auto Differential[[id number]]        Abnormal            Final result                 Please view results for these tests on the individual orders.    CBC Auto Differential [[id number]]  (Abnormal) Collected: 03/06/24 0451    Specimen: Blood Updated: 03/06/24 0529     WBC 6.19 10*3/mm3      RBC 2.94 10*6/mm3      Hemoglobin 8.7 g/dL      Hematocrit 27.4 %      MCV 93.2 fL      MCH 29.6 pg      MCHC 31.8 g/dL      RDW 13.1 %      RDW-SD 44.7 fl      MPV 9.0 fL      Platelets 181 10*3/mm3      Neutrophil % 64.9 %      Lymphocyte % 15.7 %      Monocyte % 14.9 %      Eosinophil % 3.7 %      Basophil % 0.5 %      Immature Grans % 0.3 %      Neutrophils, Absolute 4.02 10*3/mm3      Lymphocytes, Absolute 0.97 10*3/mm3       Monocytes, Absolute 0.92 10*3/mm3      Eosinophils, Absolute 0.23 10*3/mm3      Basophils, Absolute 0.03 10*3/mm3      Immature Grans, Absolute 0.02 10*3/mm3      nRBC 0.0 /100 WBC     POC Glucose Once [[id number]]  (Abnormal) Collected: 03/05/24 2113    Specimen: Blood Updated: 03/05/24 2115     Glucose 132 mg/dL     POC Glucose Once [[id number]]  (Abnormal) Collected: 03/05/24 2049    Specimen: Blood Updated: 03/05/24 2050     Glucose 133 mg/dL     POC Glucose Once [[id number]]  (Normal) Collected: 03/05/24 1703    Specimen: Blood Updated: 03/05/24 1704     Glucose 92 mg/dL     POC Glucose Once [[id number]]  (Abnormal) Collected: 03/05/24 1138    Specimen: Blood Updated: 03/05/24 1141     Glucose 174 mg/dL     Renal Function Panel [[id number]]  (Abnormal) Collected: 03/05/24 0621    Specimen: Blood Updated: 03/05/24 0745     Glucose 101 mg/dL      BUN 26 mg/dL      Creatinine 3.12 mg/dL      Sodium 139 mmol/L      Potassium 4.4 mmol/L      Chloride 102 mmol/L      CO2 29.0 mmol/L      Calcium 8.4 mg/dL      Albumin 3.5 g/dL      Phosphorus 4.1 mg/dL      Anion Gap 8.0 mmol/L      BUN/Creatinine Ratio 8.3     eGFR 13.9 mL/min/1.73      Comment: <15 Indicative of kidney failure       Narrative:      GFR Normal >60  Chronic Kidney Disease <60  Kidney Failure <15    The GFR formula is only valid for adults with stable renal function between ages 18 and 70.    CBC & Differential [[id number]]  (Abnormal) Collected: 03/05/24 0621    Specimen: Blood Updated: 03/05/24 0728    Narrative:      The following orders were created for panel order CBC & Differential.  Procedure                               Abnormality         Status                     ---------                               -----------         ------                     CBC Auto Differential[[id number]]        Abnormal            Final result                 Please view results for these tests on the individual orders.    CBC Auto Differential [[id number]]  (Abnormal)  Collected: 03/05/24 0621    Specimen: Blood Updated: 03/05/24 0728     WBC 6.89 10*3/mm3      RBC 3.09 10*6/mm3      Hemoglobin 9.5 g/dL      Hematocrit 29.9 %      MCV 96.8 fL      MCH 30.7 pg      MCHC 31.8 g/dL      RDW 13.6 %      RDW-SD 48.8 fl      MPV 9.6 fL      Platelets 178 10*3/mm3      Neutrophil % 65.1 %      Lymphocyte % 14.8 %      Monocyte % 16.8 %      Eosinophil % 2.6 %      Basophil % 0.6 %      Immature Grans % 0.1 %      Neutrophils, Absolute 4.48 10*3/mm3      Lymphocytes, Absolute 1.02 10*3/mm3      Monocytes, Absolute 1.16 10*3/mm3      Eosinophils, Absolute 0.18 10*3/mm3      Basophils, Absolute 0.04 10*3/mm3      Immature Grans, Absolute 0.01 10*3/mm3      nRBC 0.0 /100 WBC     POC Glucose Once [[id number]]  (Normal) Collected: 03/05/24 0610    Specimen: Blood Updated: 03/05/24 0612     Glucose 93 mg/dL     POC Glucose Once [[id number]]  (Abnormal) Collected: 03/04/24 2140    Specimen: Blood Updated: 03/04/24 2141     Glucose 165 mg/dL     POC Glucose Once [[id number]]  (Normal) Collected: 03/04/24 1812    Specimen: Blood Updated: 03/04/24 1814     Glucose 114 mg/dL     POC Glucose Once [[id number]]  (Abnormal) Collected: 03/04/24 1105    Specimen: Blood Updated: 03/04/24 1107     Glucose 147 mg/dL     Renal Function Panel [[id number]]  (Abnormal) Collected: 03/04/24 0511    Specimen: Blood Updated: 03/04/24 0615     Glucose 116 mg/dL      BUN 49 mg/dL      Creatinine 4.54 mg/dL      Sodium 138 mmol/L      Potassium 4.5 mmol/L      Chloride 101 mmol/L      CO2 25.0 mmol/L      Calcium 8.1 mg/dL      Albumin 3.2 g/dL      Phosphorus 4.3 mg/dL      Anion Gap 12.0 mmol/L      BUN/Creatinine Ratio 10.8     eGFR 8.9 mL/min/1.73      Comment: <15 Indicative of kidney failure       Narrative:      GFR Normal >60  Chronic Kidney Disease <60  Kidney Failure <15    The GFR formula is only valid for adults with stable renal function between ages 18 and 70.    POC Glucose Once [[id number]]  (Normal)  Collected: 03/04/24 0606    Specimen: Blood Updated: 03/04/24 0608     Glucose 97 mg/dL     CBC Auto Differential [[id number]]  (Abnormal) Collected: 03/04/24 0511    Specimen: Blood Updated: 03/04/24 0558     WBC 7.09 10*3/mm3      RBC 2.84 10*6/mm3      Hemoglobin 8.7 g/dL      Hematocrit 27.8 %      MCV 97.9 fL      MCH 30.6 pg      MCHC 31.3 g/dL      RDW 13.6 %      RDW-SD 48.3 fl      MPV 9.6 fL      Platelets 164 10*3/mm3      Neutrophil % 67.7 %      Lymphocyte % 14.2 %      Monocyte % 15.2 %      Eosinophil % 2.5 %      Basophil % 0.3 %      Immature Grans % 0.1 %      Neutrophils, Absolute 4.79 10*3/mm3      Lymphocytes, Absolute 1.01 10*3/mm3      Monocytes, Absolute 1.08 10*3/mm3      Eosinophils, Absolute 0.18 10*3/mm3      Basophils, Absolute 0.02 10*3/mm3      Immature Grans, Absolute 0.01 10*3/mm3      nRBC 0.0 /100 WBC     Lactic Acid, Plasma [[id number]]  (Normal) Collected: 03/03/24 2027    Specimen: Blood Updated: 03/03/24 2129     Lactate 1.4 mmol/L     POC Glucose Once [[id number]]  (Abnormal) Collected: 03/03/24 2024    Specimen: Blood Updated: 03/03/24 2026     Glucose 174 mg/dL             Imaging Results (All)       Procedure Component Value Units Date/Time    CT Abdomen Pelvis Without Contrast [[id number]] Collected: 03/06/24 1408     Updated: 03/06/24 1519    Narrative:      CT ABDOMEN PELVIS WO CONTRAST-     HISTORY: 89 years Female Right sandrita-nephric hematoma     TECHNIQUE:  CT includes axial imaging from the lung bases to the  trochanters without intravenous contrast and without use of oral  contrast. Data reconstructed in coronal and sagittal planes. Radiation  dose reduction techniques were utilized, including automated exposure  control and exposure modulation based on body size.     COMPARISON: Correlation with CT abdomen and pelvis report 03/03/2024.  PET/CT 04/29/2021.     FINDINGS: There is hyperdense material surrounding the majority of the  right kidney centered posterior and  lateral to the right kidney  consistent with subcapsular hematoma. Posterolateral to the right lower  pole this measures approximately 3.3 cm in thickness and extends from  above the upper pole to below the lower pole. At the right renal lower  pole there is more of an exophytic-appearing area of increased density  potentially representing a renal mass and this measures approximately  3.4 x 3.2 cm and further evaluation with CT with and without contrast or  MRI is recommended to evaluate for renal mass. There are several  hyperdense left renal lesions without evidence for acute left renal  abnormality.     The spleen, liver, adrenal glands, pancreas appear with normal limits.  Small gallstones layer dependently within the gallbladder. There is  moderate distention of the the colon with stool particularly involving  the cecum and ascending colon. There is no evidence for bowel  obstruction.     Aortobiiliac stent graft is present. There are extensive atherosclerotic  calcifications. There is a chronic left posterior basilar pleural-based  calcification with adjacent pleural parenchymal scarring.       Impression:      1. Right renal subcapsular hematoma measures up to 3.3 cm in thickness  which is similar to that described on the CT abdomen and pelvis report 3  days ago.  2. 3.4 cm exophytic masslike density right lower pole suspicious for a  solid renal mass and is a potential source for hematoma. Recommend  further evaluation with CT or MRI with and without contrast to evaluate  for a solid lesion. At that time, additional hyperdense bilateral renal  smaller lesions could be evaluated.  3. Cholelithiasis.  4. Moderate stool throughout the colon consistent with constipation.  5. Chronic left lung base pleural parenchymal scarring with  calcification.      Radiation dose reduction techniques were utilized, including automated  exposure control and exposure modulation based on body size.        This report was  finalized on 3/6/2024 3:16 PM by Dr. Naseem Mancera M.D on Workstation: [id number]               Medication Review:   Current Facility-Administered Medications   Medication Dose Route Frequency Provider Last Rate Last Admin    acetaminophen (TYLENOL) tablet 650 mg  650 mg Oral Q4H PRN Lavern Del Rosario MD   650 mg at 03/04/24 0130    Or    acetaminophen (TYLENOL) 160 MG/5ML oral solution 650 mg  650 mg Oral Q4H PRN Lavern Del Rosario MD        Or    acetaminophen (TYLENOL) suppository 650 mg  650 mg Rectal Q4H PRN Lavern Del Rosario MD        albuterol (PROVENTIL) nebulizer solution 0.083% 2.5 mg/3mL  2.5 mg Nebulization Q6H PRN Lavern Del Rosario MD        sennosides-docusate (PERICOLACE) 8.6-50 MG per tablet 2 tablet  2 tablet Oral BID PRN Lavern Del Rosario MD   2 tablet at 03/06/24 1817    And    polyethylene glycol (MIRALAX) packet 17 g  17 g Oral Daily PRN Lavern Del Rosario MD   17 g at 03/07/24 0630    And    bisacodyl (DULCOLAX) EC tablet 5 mg  5 mg Oral Daily PRN Lavern Del Rosario MD        And    bisacodyl (DULCOLAX) suppository 10 mg  10 mg Rectal Daily PRN Lavern Del Rosario MD        budesonide-formoterol (SYMBICORT) 160-4.5 MCG/ACT inhaler 2 puff  2 puff Inhalation BID - RT Lavern Del Rosario MD   2 puff at 03/07/24 0850    carvedilol (COREG) tablet 25 mg  25 mg Oral Q12H Lavern Del Rosario MD   25 mg at 03/07/24 0904    cefTRIAXone (ROCEPHIN) 1,000 mg in sodium chloride 0.9 % 100 mL IVPB-VTB  1,000 mg Intravenous Q24H Lavern Del Rosario  mL/hr at 03/06/24 1957 1,000 mg at 03/06/24 1957    dextrose (D50W) (25 g/50 mL) IV injection 25 g  25 g Intravenous Q15 Min PRN Lavern Del Rosario MD        dextrose (GLUTOSE) oral gel 15 g  15 g Oral Q15 Min Lavern Knight MD        glucagon (GLUCAGEN) injection 1 mg  1 mg Intramuscular Q15 Min PRN Lavern Del Rosario MD        hydrALAZINE (APRESOLINE) tablet 25 mg  25 mg Oral Q12H  Dakota Morgan MD   25 mg at 03/07/24 0903    HYDROcodone-acetaminophen (NORCO) 7.5-325 MG per tablet 1 tablet  1 tablet Oral Q6H PRN Lavern Del Rosario MD   1 tablet at 03/07/24 0908    insulin lispro (HUMALOG/ADMELOG) injection 2-7 Units  2-7 Units Subcutaneous 4x Daily AC & at Bedtime Lavern Del Rosario MD   2 Units at 03/06/24 2150    lidocaine (LMX) 4 % cream 1 Application  1 Application Topical PRN Matt Menendez MD   1 Application at 03/06/24 1234    ondansetron (ZOFRAN) injection 4 mg  4 mg Intravenous Q6H PRN Lavern Del Rosario MD   4 mg at 03/03/24 1746    pantoprazole (PROTONIX) EC tablet 40 mg  40 mg Oral BID AC Lavern Del Rosario MD   40 mg at 03/07/24 0630    polycarbophil tablet 625 mg  625 mg Oral Daily Lavern Del Rosario MD   625 mg at 03/07/24 0904    rosuvastatin (CRESTOR) tablet 20 mg  20 mg Oral Nightly Lavern Del Rosario MD   20 mg at 03/06/24 2005    sevelamer (RENVELA) tablet 800 mg  800 mg Oral TID With Meals Lavern Del Rosario MD   800 mg at 03/07/24 1230       Current Facility-Administered Medications:     acetaminophen (TYLENOL) tablet 650 mg, 650 mg, Oral, Q4H PRN, 650 mg at 03/04/24 0130 **OR** acetaminophen (TYLENOL) 160 MG/5ML oral solution 650 mg, 650 mg, Oral, Q4H PRN **OR** acetaminophen (TYLENOL) suppository 650 mg, 650 mg, Rectal, Q4H PRN, Lavern Del Rosario MD    albuterol (PROVENTIL) nebulizer solution 0.083% 2.5 mg/3mL, 2.5 mg, Nebulization, Q6H PRN, Lavern Del Rosario MD    sennosides-docusate (PERICOLACE) 8.6-50 MG per tablet 2 tablet, 2 tablet, Oral, BID PRN, 2 tablet at 03/06/24 1817 **AND** polyethylene glycol (MIRALAX) packet 17 g, 17 g, Oral, Daily PRN, 17 g at 03/07/24 0630 **AND** bisacodyl (DULCOLAX) EC tablet 5 mg, 5 mg, Oral, Daily PRN **AND** bisacodyl (DULCOLAX) suppository 10 mg, 10 mg, Rectal, Daily PRN, Lavern Del Rosario MD    budesonide-formoterol (SYMBICORT) 160-4.5 MCG/ACT inhaler 2 puff, 2 puff,  Inhalation, BID - RT, Lavern Del Rosario MD, 2 puff at 03/07/24 0850    carvedilol (COREG) tablet 25 mg, 25 mg, Oral, Q12H, Lavenr Del Rosario MD, 25 mg at 03/07/24 0904    cefTRIAXone (ROCEPHIN) 1,000 mg in sodium chloride 0.9 % 100 mL IVPB-VTB, 1,000 mg, Intravenous, Q24H, Lavern Del Rosario MD, Last Rate: 200 mL/hr at 03/06/24 1957, 1,000 mg at 03/06/24 1957    dextrose (D50W) (25 g/50 mL) IV injection 25 g, 25 g, Intravenous, Q15 Min PRN, Lavern Del Rosario MD    dextrose (GLUTOSE) oral gel 15 g, 15 g, Oral, Q15 Min PRN, Lavern Del Rosario MD    glucagon (GLUCAGEN) injection 1 mg, 1 mg, Intramuscular, Q15 Min PRN, Lavern Del Rosario MD    hydrALAZINE (APRESOLINE) tablet 25 mg, 25 mg, Oral, Q12H, Dakota Morgan MD, 25 mg at 03/07/24 0903    HYDROcodone-acetaminophen (NORCO) 7.5-325 MG per tablet 1 tablet, 1 tablet, Oral, Q6H PRN, Lavern Del Rosario MD, 1 tablet at 03/07/24 0908    insulin lispro (HUMALOG/ADMELOG) injection 2-7 Units, 2-7 Units, Subcutaneous, 4x Daily AC & at Bedtime, Lavern Del Rosario MD, 2 Units at 03/06/24 2150    lidocaine (LMX) 4 % cream 1 Application, 1 Application, Topical, PRN, ShonMatt MD, 1 Application at 03/06/24 1234    ondansetron (ZOFRAN) injection 4 mg, 4 mg, Intravenous, Q6H PRN, Lavern Del Rosario MD, 4 mg at 03/03/24 1746    pantoprazole (PROTONIX) EC tablet 40 mg, 40 mg, Oral, BID AC, Lavern Del Rosario MD, 40 mg at 03/07/24 0630    polycarbophil tablet 625 mg, 625 mg, Oral, Daily, Lavern Del Rosario MD, 625 mg at 03/07/24 0904    rosuvastatin (CRESTOR) tablet 20 mg, 20 mg, Oral, Nightly, Lavern Del Rosario MD, 20 mg at 03/06/24 2005    sevelamer (RENVELA) tablet 800 mg, 800 mg, Oral, TID With Meals, Lavern Del Rosario MD, 800 mg at 03/07/24 1230  Medications Discontinued During This Encounter   Medication Reason    carvedilol (COREG) tablet 25 mg     bumetanide (BUMEX) 1 MG tablet     hydrALAZINE  (APRESOLINE) 25 MG tablet        Assessment & Plan         Hematoma    Essential hypertension    Type 2 diabetes mellitus    ESRD (end stage renal disease)    Renal mass    Plan   - discussed the CT findings, which show a stable hematoma but possible renal mass. We dsicussed imaging with IV contrast. Since the patient is already on dialysis, we would like to undergo CT renal mass protocol.  - plan CT renal mass tomorrow morning, prior to HD    León Neville Jr., MD  03/07/24  13:03 EST

## 2024-03-07 NOTE — PROGRESS NOTES
[facility] Progress Note      Patient Name: Charlette Rai  : 1935  MRN: [id number]  Primary Care Physician:  Rafael Lyons MD  Date of admission: 3/3/2024    Subjective     Interval History:   Follow-up end-stage renal disease    Patient sitting in a recliner accompanied by family members  Continues to have discomfort on the right flank radiated to the right groin area  Denies any chest pain, shortness of breath or palpitations  Tolerating oral intake    Patient is scheduled to undergo CT scan renal mass protocol tomorrow    Review of Systems:   As noted above    Objective     Vitals:   Temp:  [96.9 °F (36.1 °C)-98.1 °F (36.7 °C)] 97 °F (36.1 °C)  Heart Rate:  [75-95] 75  Resp:  [16] 16  BP: (149-182)/(52-71) 159/52  Flow (L/min):  [3] 3    Intake/Output Summary (Last 24 hours) at 3/7/2024 1752  Last data filed at 3/6/2024 1957  Gross per 24 hour   Intake 100 ml   Output --   Net 100 ml       Physical Exam:    General Appearance: Awake, alert, chronically ill, no acute  Skin: warm and dry  HEENT: oral mucosa normal, nonicteric sclera  Neck: supple, no JVD  Lungs: CTA, unlabored breathing effort  Heart: RRR, normal S1 and S2, no rub  Abdomen: soft, nontender, nondistended  : no palpable bladder, she had mild right CVA tenderness  Extremities: no edema, cyanosis or clubbing, functional AV graft in the left upper arm    Scheduled Meds:     budesonide-formoterol, 2 puff, Inhalation, BID - RT  carvedilol, 25 mg, Oral, Q12H  cefTRIAXone, 1,000 mg, Intravenous, Q24H  hydrALAZINE, 25 mg, Oral, Q12H  insulin lispro, 2-7 Units, Subcutaneous, 4x Daily AC & at Bedtime  pantoprazole, 40 mg, Oral, BID AC  polycarbophil, 625 mg, Oral, Daily  rosuvastatin, 20 mg, Oral, Nightly  sevelamer, 800 mg, Oral, TID With Meals      IV Meds:        Results Reviewed:   I have personally reviewed the results from the time of this admission to 3/7/2024 17:52 EST     Results from last 7 days   Lab Units  03/07/24  0705 03/06/24  0451 03/05/24  0621 03/04/24  0511 03/03/24  0953   SODIUM mmol/L 134* 134* 139   < > 138   POTASSIUM mmol/L 4.0 4.5 4.4   < > 4.0   CHLORIDE mmol/L 102 98 102   < > 101   CO2 mmol/L 25.0 23.3 29.0   < > 26.6   BUN mg/dL 19 39* 26*   < > 42*   CREATININE mg/dL 2.70* 4.24* 3.12*   < > 3.72*   CALCIUM mg/dL 8.1* 7.9* 8.4*   < > 8.2*   BILIRUBIN mg/dL  --   --   --   --  0.2   ALK PHOS U/L  --   --   --   --  199*   ALT (SGPT) U/L  --   --   --   --  9   AST (SGOT) U/L  --   --   --   --  11   GLUCOSE mg/dL 94 102* 101*   < > 204*    < > = values in this interval not displayed.       Estimated Creatinine Clearance: 15.8 mL/min (A) (by C-G formula based on SCr of 2.7 mg/dL (H)).    Results from last 7 days   Lab Units 03/07/24  0705 03/06/24 0451 03/05/24  0621   PHOSPHORUS mg/dL 3.6 4.3 4.1             Results from last 7 days   Lab Units 03/07/24  0705 03/06/24  0451 03/05/24  0621 03/04/24  0511 03/03/24  0953   WBC 10*3/mm3 6.00 6.19 6.89 7.09 10.28   HEMOGLOBIN g/dL 8.6* 8.7* 9.5* 8.7* 9.7*   PLATELETS 10*3/mm3 164 181 178 164 177             Assessment / Plan     ASSESSMENT:  End-stage renal disease on  hemodialysis  Monday, Wednesday and Friday w/ Dr. García .  Volume status and electrolytes stable  Diabetes mellitus type 2 with renal complication  Hypertension with chronic kidney disease reasonably controlled  Spontaneous right subcapsular renal hematoma , unclear etiology .  Concerning for local mass undergoing CT mass protocol as per urology recommendations  Anemia of chronic kidney disease hemoglobin 8.6 stable  Metabolic bone disease treated with phosphate binder.  Repeat phosphorus 3.6    PLAN:  Hemodialysis tomorrow   Patient is scheduled to undergo CT scan renal mass protocol tomorrow  Surveillance labs    I reviewed the chart and other providers notes, I reviewed labs.  I discussed the case with the patient and family at bedside  Copied text in this note has been reviewed and is  accurate as of 03/07/24.       Thank you for involving us in the care of Charlette Rai.  Please feel free to call with any questions.    Kirk Medrano MD  03/07/24  17:52 [facility]    [facility]  [phone]    Please note that portions of this note were completed with a voice recognition program.   declines

## 2024-03-07 NOTE — PROGRESS NOTES
Name: Charlette Rai ADMIT: 3/3/2024   : 1935  PCP: Rafael Lyons MD    MRN: [id number] LOS: 0 days   AGE/SEX: 89 y.o. female  ROOM: [facility]     Subjective   Subjective   Pleasant patient, resting in bed, no complaints. A little sad she will have to stay here an extra day or so. We discussed CT scan findings.        Objective   Objective   Vital Signs  Temp:  [96.9 °F (36.1 °C)-98.1 °F (36.7 °C)] 97.2 °F (36.2 °C)  Heart Rate:  [81-95] 81  Resp:  [16] 16  BP: (149-182)/(55-71) 167/61  SpO2:  [94 %-100 %] 100 %  on  Flow (L/min):  [3] 3;   Device (Oxygen Therapy): room air  Body mass index is 24.98 kg/m².  Physical Exam  Constitutional:       General: She is not in acute distress.     Appearance: She is ill-appearing (chronically).   Cardiovascular:      Rate and Rhythm: Normal rate and regular rhythm.   Pulmonary:      Effort: Pulmonary effort is normal. No respiratory distress.      Breath sounds: Normal breath sounds. No wheezing.   Abdominal:      General: Abdomen is flat. Bowel sounds are normal.      Palpations: Abdomen is soft.      Tenderness: There is no abdominal tenderness.   Musculoskeletal:         General: No swelling or tenderness.      Right lower leg: No edema.      Left lower leg: No edema.      Comments: LUE AVF   Skin:     General: Skin is warm and dry.   Neurological:      General: No focal deficit present.      Mental Status: She is alert and oriented to person, place, and time. Mental status is at baseline.         Results Review     I reviewed the patient's new clinical results.  Results from last 7 days   Lab Units 24  0705 24  0451 24  0624  0511   WBC 10*3/mm3 6.00 6.19 6.89 7.09   HEMOGLOBIN g/dL 8.6* 8.7* 9.5* 8.7*   PLATELETS 10*3/mm3 164 181 178 164     Results from last 7 days   Lab Units 24  0705 24  0451 24  0621 24  0511   SODIUM mmol/L 134* 134* 139 138   POTASSIUM mmol/L 4.0 4.5 4.4 4.5   CHLORIDE mmol/L 102 98 102 101    CO2 mmol/L 25.0 23.3 29.0 25.0   BUN mg/dL 19 39* 26* 49*   CREATININE mg/dL 2.70* 4.24* 3.12* 4.54*   GLUCOSE mg/dL 94 102* 101* 116*   Estimated Creatinine Clearance: 15.8 mL/min (A) (by C-G formula based on SCr of 2.7 mg/dL (H)).  Results from last 7 days   Lab Units 03/07/24  0705 03/06/24 0451 03/05/24 0621 03/04/24  0511 03/03/24  0953   ALBUMIN g/dL 3.1* 3.0* 3.5 3.2* 3.6   BILIRUBIN mg/dL  --   --   --   --  0.2   ALK PHOS U/L  --   --   --   --  199*   AST (SGOT) U/L  --   --   --   --  11   ALT (SGPT) U/L  --   --   --   --  9     Results from last 7 days   Lab Units 03/07/24 0705 03/06/24 0451 03/05/24 0621 03/04/24  0511   CALCIUM mg/dL 8.1* 7.9* 8.4* 8.1*   ALBUMIN g/dL 3.1* 3.0* 3.5 3.2*   PHOSPHORUS mg/dL 3.6 4.3 4.1 4.3     Results from last 7 days   Lab Units 03/03/24 2027   LACTATE mmol/L 1.4     COVID19   Date Value Ref Range Status   11/09/2023 Not Detected Not Detected - Ref. Range Final   10/29/2023 Not Detected Not Detected - Ref. Range Final     Glucose   Date/Time Value Ref Range Status   03/07/2024 1106 102 70 - 130 mg/dL Final   03/07/2024 0616 104 70 - 130 mg/dL Final   03/06/2024 2130 175 (H) 70 - 130 mg/dL Final   03/06/2024 1829 92 70 - 130 mg/dL Final   03/06/2024 1057 149 (H) 70 - 130 mg/dL Final   03/06/2024 0551 95 70 - 130 mg/dL Final   03/05/2024 2113 132 (H) 70 - 130 mg/dL Final       CT Abdomen Pelvis Without Contrast  Narrative: CT ABDOMEN PELVIS WO CONTRAST-     HISTORY: 89 years Female Right sandrita-nephric hematoma     TECHNIQUE:  CT includes axial imaging from the lung bases to the  trochanters without intravenous contrast and without use of oral  contrast. Data reconstructed in coronal and sagittal planes. Radiation  dose reduction techniques were utilized, including automated exposure  control and exposure modulation based on body size.     COMPARISON: Correlation with CT abdomen and pelvis report 03/03/2024.  PET/CT 04/29/2021.     FINDINGS: There is hyperdense  material surrounding the majority of the  right kidney centered posterior and lateral to the right kidney  consistent with subcapsular hematoma. Posterolateral to the right lower  pole this measures approximately 3.3 cm in thickness and extends from  above the upper pole to below the lower pole. At the right renal lower  pole there is more of an exophytic-appearing area of increased density  potentially representing a renal mass and this measures approximately  3.4 x 3.2 cm and further evaluation with CT with and without contrast or  MRI is recommended to evaluate for renal mass. There are several  hyperdense left renal lesions without evidence for acute left renal  abnormality.     The spleen, liver, adrenal glands, pancreas appear with normal limits.  Small gallstones layer dependently within the gallbladder. There is  moderate distention of the the colon with stool particularly involving  the cecum and ascending colon. There is no evidence for bowel  obstruction.     Aortobiiliac stent graft is present. There are extensive atherosclerotic  calcifications. There is a chronic left posterior basilar pleural-based  calcification with adjacent pleural parenchymal scarring.     Impression: 1. Right renal subcapsular hematoma measures up to 3.3 cm in thickness  which is similar to that described on the CT abdomen and pelvis report 3  days ago.  2. 3.4 cm exophytic masslike density right lower pole suspicious for a  solid renal mass and is a potential source for hematoma. Recommend  further evaluation with CT or MRI with and without contrast to evaluate  for a solid lesion. At that time, additional hyperdense bilateral renal  smaller lesions could be evaluated.  3. Cholelithiasis.  4. Moderate stool throughout the colon consistent with constipation.  5. Chronic left lung base pleural parenchymal scarring with  calcification.      Radiation dose reduction techniques were utilized, including automated  exposure control and  exposure modulation based on body size.        This report was finalized on 3/6/2024 3:16 PM by Dr. aNseem Mancera M.D on Workstation: [id number]       Scheduled Medications  budesonide-formoterol, 2 puff, Inhalation, BID - RT  carvedilol, 25 mg, Oral, Q12H  cefTRIAXone, 1,000 mg, Intravenous, Q24H  hydrALAZINE, 25 mg, Oral, Q12H  insulin lispro, 2-7 Units, Subcutaneous, 4x Daily AC & at Bedtime  pantoprazole, 40 mg, Oral, BID AC  polycarbophil, 625 mg, Oral, Daily  rosuvastatin, 20 mg, Oral, Nightly  sevelamer, 800 mg, Oral, TID With Meals    Infusions   Diet  Diet: Regular/House; Fluid Consistency: Thin (IDDSI 0)         I have personally reviewed:  [x]  Laboratory   []  Microbiology   [x]  Radiology   [x]  EKG/Telemetry   []  Cardiology/Vascular   []  Pathology   []  Records    Assessment/Plan     Active Hospital Problems    Diagnosis  POA    **Hematoma [T14.8XXA]  Yes    ESRD (end stage renal disease) [N18.6]  Unknown    Essential hypertension [I10]  Yes    Type 2 diabetes mellitus [E11.9]  Unknown      Resolved Hospital Problems   No resolved problems to display.       89 y.o. female admitted with Hematoma.    Subcapsular hematoma, right  -Hemoglobin is stable, continue with conservative management   -Repeat CT with stable hematoma    Renal mass  - visualized on f/u CT  - plan for CT w contrast tomorrow followed by HD     ESRD on HD  Anemia of chronic disease  Metabolic bone disease  -Nephrology consulted  -Dialysis per renal  -Continue Phos binders    Type 2 diabetes  -Continue sliding scale insulin; adjust as needed  -A1c 10/23 7.8%    Hypertension  -Continue Coreg, hydralazine    SCDs for DVT prophylaxis.  Full code.  Discussed with patient.  Anticipate discharge  tbd   tbd      Emily Chandra MD  [facility]  03/07/24  14:11 EST    I wore protective equipment throughout this patient encounter including gloves.  Hand hygiene was performed before donning protective equipment and  after removal when leaving the room.    Expected Discharge Date and Time       Expected Discharge Date Expected Discharge Time    Mar 6, 2024              Copied text in this note has been reviewed and is accurate as of 03/07/24.

## 2024-03-08 ENCOUNTER — APPOINTMENT (OUTPATIENT)
Dept: CT IMAGING | Facility: HOSPITAL | Age: 89
End: 2024-03-08
Payer: MEDICARE

## 2024-03-08 LAB
BACTERIA SPEC AEROBE CULT: NORMAL
BACTERIA SPEC AEROBE CULT: NORMAL
DEPRECATED RDW RBC AUTO: 46.6 FL (ref 37–54)
ERYTHROCYTE [DISTWIDTH] IN BLOOD BY AUTOMATED COUNT: 13.5 % (ref 12.3–15.4)
GLUCOSE BLDC GLUCOMTR-MCNC: 114 MG/DL (ref 70–130)
GLUCOSE BLDC GLUCOMTR-MCNC: 128 MG/DL (ref 70–130)
GLUCOSE BLDC GLUCOMTR-MCNC: 149 MG/DL (ref 70–130)
GLUCOSE BLDC GLUCOMTR-MCNC: 91 MG/DL (ref 70–130)
HCT VFR BLD AUTO: 27.3 % (ref 34–46.6)
HGB BLD-MCNC: 8.6 G/DL (ref 12–15.9)
MCH RBC QN AUTO: 30 PG (ref 26.6–33)
MCHC RBC AUTO-ENTMCNC: 31.5 G/DL (ref 31.5–35.7)
MCV RBC AUTO: 95.1 FL (ref 79–97)
PLATELET # BLD AUTO: 190 10*3/MM3 (ref 140–450)
PMV BLD AUTO: 9 FL (ref 6–12)
RBC # BLD AUTO: 2.87 10*6/MM3 (ref 3.77–5.28)
WBC NRBC COR # BLD AUTO: 6.15 10*3/MM3 (ref 3.4–10.8)

## 2024-03-08 PROCEDURE — 63710000001 PANTOPRAZOLE 40 MG TABLET DELAYED-RELEASE: Performed by: INTERNAL MEDICINE

## 2024-03-08 PROCEDURE — A9270 NON-COVERED ITEM OR SERVICE: HCPCS | Performed by: INTERNAL MEDICINE

## 2024-03-08 PROCEDURE — 63710000001 SEVELAMER 800 MG TABLET: Performed by: INTERNAL MEDICINE

## 2024-03-08 PROCEDURE — 63710000001 HYDRALAZINE 25 MG TABLET: Performed by: INTERNAL MEDICINE

## 2024-03-08 PROCEDURE — 99203 OFFICE O/P NEW LOW 30 MIN: CPT | Performed by: SURGERY

## 2024-03-08 PROCEDURE — G0378 HOSPITAL OBSERVATION PER HR: HCPCS

## 2024-03-08 PROCEDURE — 63710000001 POLYCARBOPHIL 625 MG TABLET: Performed by: INTERNAL MEDICINE

## 2024-03-08 PROCEDURE — 94799 UNLISTED PULMONARY SVC/PX: CPT

## 2024-03-08 PROCEDURE — 85027 COMPLETE CBC AUTOMATED: CPT | Performed by: UROLOGY

## 2024-03-08 PROCEDURE — G0257 UNSCHED DIALYSIS ESRD PT HOS: HCPCS

## 2024-03-08 PROCEDURE — 63710000001 ACETAMINOPHEN 325 MG TABLET: Performed by: INTERNAL MEDICINE

## 2024-03-08 PROCEDURE — 82948 REAGENT STRIP/BLOOD GLUCOSE: CPT

## 2024-03-08 PROCEDURE — 63710000001 CARVEDILOL 25 MG TABLET: Performed by: INTERNAL MEDICINE

## 2024-03-08 PROCEDURE — 63710000001 ROSUVASTATIN 20 MG TABLET: Performed by: INTERNAL MEDICINE

## 2024-03-08 PROCEDURE — 63710000001 BISACODYL 10 MG SUPPOSITORY: Performed by: INTERNAL MEDICINE

## 2024-03-08 PROCEDURE — 74178 CT ABD&PLV WO CNTR FLWD CNTR: CPT

## 2024-03-08 PROCEDURE — 63710000001 HYDROCODONE-ACETAMINOPHEN 7.5-325 MG TABLET: Performed by: INTERNAL MEDICINE

## 2024-03-08 PROCEDURE — 25510000001 IOPAMIDOL 61 % SOLUTION: Performed by: STUDENT IN AN ORGANIZED HEALTH CARE EDUCATION/TRAINING PROGRAM

## 2024-03-08 RX ORDER — HYDROCODONE BITARTRATE AND ACETAMINOPHEN 7.5; 325 MG/1; MG/1
1 TABLET ORAL EVERY 6 HOURS PRN
Status: DISCONTINUED | OUTPATIENT
Start: 2024-03-08 | End: 2024-03-09 | Stop reason: HOSPADM

## 2024-03-08 RX ADMIN — HYDRALAZINE HYDROCHLORIDE 25 MG: 25 TABLET ORAL at 10:57

## 2024-03-08 RX ADMIN — SEVELAMER CARBONATE 800 MG: 800 TABLET, FILM COATED ORAL at 12:16

## 2024-03-08 RX ADMIN — ACETAMINOPHEN 325MG 650 MG: 325 TABLET ORAL at 03:48

## 2024-03-08 RX ADMIN — PANTOPRAZOLE SODIUM 40 MG: 40 TABLET, DELAYED RELEASE ORAL at 18:52

## 2024-03-08 RX ADMIN — SEVELAMER CARBONATE 800 MG: 800 TABLET, FILM COATED ORAL at 10:57

## 2024-03-08 RX ADMIN — HYDROCODONE BITARTRATE AND ACETAMINOPHEN 1 TABLET: 7.5; 325 TABLET ORAL at 12:16

## 2024-03-08 RX ADMIN — BISACODYL 10 MG: 10 SUPPOSITORY RECTAL at 06:30

## 2024-03-08 RX ADMIN — CARVEDILOL 25 MG: 25 TABLET, FILM COATED ORAL at 20:06

## 2024-03-08 RX ADMIN — ACETAMINOPHEN 325MG 650 MG: 325 TABLET ORAL at 23:12

## 2024-03-08 RX ADMIN — LIDOCAINE 4% 1 APPLICATION: 4 CREAM TOPICAL at 12:16

## 2024-03-08 RX ADMIN — CARVEDILOL 25 MG: 25 TABLET, FILM COATED ORAL at 10:56

## 2024-03-08 RX ADMIN — HYDROCODONE BITARTRATE AND ACETAMINOPHEN 1 TABLET: 7.5; 325 TABLET ORAL at 00:08

## 2024-03-08 RX ADMIN — SEVELAMER CARBONATE 800 MG: 800 TABLET, FILM COATED ORAL at 18:52

## 2024-03-08 RX ADMIN — PANTOPRAZOLE SODIUM 40 MG: 40 TABLET, DELAYED RELEASE ORAL at 06:30

## 2024-03-08 RX ADMIN — CALCIUM POLYCARBOPHIL 625 MG: 625 TABLET, FILM COATED ORAL at 10:56

## 2024-03-08 RX ADMIN — BUDESONIDE AND FORMOTEROL FUMARATE DIHYDRATE 2 PUFF: 160; 4.5 AEROSOL RESPIRATORY (INHALATION) at 19:56

## 2024-03-08 RX ADMIN — HYDROCODONE BITARTRATE AND ACETAMINOPHEN 1 TABLET: 7.5; 325 TABLET ORAL at 18:56

## 2024-03-08 RX ADMIN — ROSUVASTATIN CALCIUM 20 MG: 20 TABLET, FILM COATED ORAL at 20:07

## 2024-03-08 RX ADMIN — HYDRALAZINE HYDROCHLORIDE 25 MG: 25 TABLET ORAL at 20:06

## 2024-03-08 RX ADMIN — HYDROCODONE BITARTRATE AND ACETAMINOPHEN 1 TABLET: 7.5; 325 TABLET ORAL at 06:33

## 2024-03-08 RX ADMIN — IOPAMIDOL 100 ML: 612 INJECTION, SOLUTION INTRAVENOUS at 07:38

## 2024-03-08 NOTE — CONSULTS
General Surgery Consultation    Consulting Physician: Ananya Sebastian MD    Referring Physician: Emily Chandra MD    Reason for consultation: Right groin pain    CC: Right groin pain    HPI:   The patient is a very pleasant 89 y.o. female who presented to the hospital with right flank pain and ESRD. This morning she was complaining of sharp stabbing right groin pain. Ice helped overnight. It is worse with moving. She has not noticed any swelling or redness. She does not have symptoms from her left sided hernia seen on CT. she does report some arthritis pain that typically shoots towards her back.    Past Medical History:    Past Medical History:   Diagnosis Date    Allergic rhinitis     Anemia     Arthritis     Asthma     USE INHALERS AND NEBULIZERS    Back pain     Bladder disorder     Cancer     LEFT LUNG CANCER 2005 SURGERY AND CHEMO DONE  AND CURRENTLY 4/ 14/22  RIGHT LUNG CANCER HAS ONLY RECEIVED RADIATION THUS FAR    Chronic kidney disease     stage 3    CKD (chronic kidney disease) stage 4, GFR 15-29 ml/min     Condition not found     ulcer    Congestive heart failure (CHF)     FOLLOWED BY DR AQUINO. DENIES CP BUT DOES HAVE SOA CHRONIC ISSUE COPD/LUNG CANCER    COPD (chronic obstructive pulmonary disease)     FOLLOWED BY DR MARIAJOSE BAILEY    Coronary artery disease     DENIES CP BUT DOES GET SOA MOST OF THE TIME WITH EXERTION BUT OCC AT REST CHRONIC ISSUE COPD/LUNG CANCER    Deep vein thrombosis     Diabetes mellitus     DOES NOT CHECK BS DAILY    Disease of thyroid gland     HYPOTHYROIDISM    Essential hypertension     Gastric ulcer     GERD (gastroesophageal reflux disease)     Heart murmur     History of transfusion     NO ISSUES POST TRANSFUSION WAS MANY YEARS AGO    Hyperlipemia     Leukocytopenia     FOLLOWED BY DR MIR BAILEY    Limb swelling     Lumbago     Lumbar spinal stenosis     Lung cancer     Migraine headache     Multiple joint pain     On home oxygen therapy     3L/NC PRN    Osteopenia     Reflux  esophagitis     Shortness of breath     Thyroid nodule     HAS ULTRASOUND YEARLY BEING MONITORED    Vascular disease     Vitamin D deficiency        Past Surgical History:    Past Surgical History:   Procedure Laterality Date    ABDOMINAL SURGERY      APPENDECTOMY      ARTERIOVENOUS FISTULA/SHUNT SURGERY Left 05/10/2022    Procedure: Left basilic vein transposition;  Surgeon: Wan Barksdale MD;  Location: [facility] MAIN OR;  Service: Vascular;  Laterality: Left;    ARTERIOVENOUS FISTULA/SHUNT SURGERY Left 3/23/2023    Procedure: Ligation of left arm arteriovenous fistula;  Surgeon: Wan Barksdale MD;  Location: [facility] MAIN OR;  Service: Vascular;  Laterality: Left;    ARTERIOVENOUS FISTULA/SHUNT SURGERY Left 11/30/2023    Procedure: Creation of left arm arteriovenous graft;  Surgeon: Wan Barksdale MD;  Location: [facility] MAIN OR;  Service: Vascular;  Laterality: Left;    CARDIAC CATHETERIZATION  1996    CARDIAC SURGERY      CARDIAC SURGERY      fluid drained from heart    CATARACT EXTRACTION, BILATERAL  2003    COLONOSCOPY  2014    ENDOSCOPY  2016 2019    FEMORAL ARTERY STENT Bilateral     HYSTERECTOMY      LUNG BIOPSY Left 2005    lobectomy upper lung caner    LUNG VOLUME REDUCTION      OTHER SURGICAL HISTORY      artifical joints/limbs    REPLACEMENT TOTAL KNEE Left 2016    UPPER GASTROINTESTINAL ENDOSCOPY         Medications:  Medications Prior to Admission   Medication Sig Dispense Refill Last Dose    albuterol sulfate  (90 Base) MCG/ACT inhaler Inhale 2 puffs Every 4 (Four) Hours As Needed for Wheezing.       aspirin 81 MG chewable tablet Chew 1 tablet Daily.       carvedilol (Coreg) 25 MG tablet Take 1 tablet by mouth 2 (Two) Times a Day With Meals for 30 days. 60 tablet 0     cilostazol (PLETAL) 100 MG tablet Take 1 tablet by mouth 2 (two) times a day.       dexlansoprazole (DEXILANT) 60 MG capsule Take 1 capsule by mouth Daily.       ergocalciferol (ERGOCALCIFEROL) 1.25 MG (18902 UT) capsule Take 1  capsule by mouth Every 14 (Fourteen) Days.       levocetirizine (XYZAL) 5 MG tablet TAKE 1 TABLET DAILY (Patient taking differently: Take 1 tablet by mouth Every Evening.) 90 tablet 1     linagliptin (Tradjenta) 5 MG tablet tablet Take 1 tablet by mouth Daily. 90 tablet 1     nitroglycerin (NITROSTAT) 0.4 MG SL tablet Place 1 tablet under the tongue Every 5 (Five) Minutes As Needed for Chest Pain.       polycarbophil (calcium polycarbophil) 625 MG tablet tablet Take 1 tablet by mouth Daily As Needed.       rosuvastatin (CRESTOR) 20 MG tablet Take 1 tablet by mouth Daily. 90 tablet 3     sevelamer (RENVELA) 800 MG tablet Take 1 tablet by mouth 3 (Three) Times a Day With Meals.       Symbicort 160-4.5 MCG/ACT inhaler Inhale 2 puffs 2 (Two) Times a Day As Needed.       traMADol (ULTRAM) 50 MG tablet Take 1 tablet by mouth Every 8 (Eight) Hours As Needed.          Allergies: No Known Allergies    Social History:     Social History     Socioeconomic History    Marital status:    Tobacco Use    Smoking status: Former     Current packs/day: 0.00     Average packs/day: 1 pack/day for 52.5 years (52.5 ttl pk-yrs)     Types: Cigarettes     Start date:      Quit date: 2004     Years since quittin.7     Passive exposure: Past    Smokeless tobacco: Never   Vaping Use    Vaping status: Never Used   Substance and Sexual Activity    Alcohol use: Never    Drug use: Never    Sexual activity: Defer       Family History:     Family History   Problem Relation Age of Onset    Arthritis Mother     Arthritis Father     Cancer Brother     Diabetes Brother     Other Brother         blood disease     Prostate cancer Brother     Cancer Brother     Prostate cancer Brother     Cancer Brother     Cancer Brother     Malig Hyperthermia Neg Hx     Colon cancer Neg Hx        Review of Systems:  Constitutional: denies any weight changes, fatigue, or weakness  Eyes: denies blurred/double vision or scleral icterus  Cardiovascular:  "denies chest pain, palpitations, or edemas  Respiratory: denies cough, sputum, or dyspnea  Gastrointestinal:  denies abdominal pain, nausea   Genitourinary: denies dysuria or hematuria  Endocrine: denies cold intolerance, lethargy, or flushing  Hematologic: denies excessive bruising or bleeding  Musculoskeletal: denies weakness, joint swelling, joint pain, or stiffness  Neurologic: denies seizures, CVA, paresthesia, or peripheral neuropathy  Skin: denies change in nevi, rashes, masses, or jaundice     All other systems reviewed and were negative.    Physical Exam:   Vitals:    03/08/24 0946   BP: 135/66   Pulse: 78   Resp: 16   Temp: 96.9 °F (36.1 °C)   SpO2: 96%     Height: 65\"  Weight: 68 kg  BMI: 24.98  GENERAL: awake and alert, no acute distress, oriented to person, place, and time  HEENT: normocephalic, atraumatic, no scleral icterus, moist mucous membranes  NECK: Supple, there is no thyromegaly or lymphadenopathy  RESPIRATORY: clear to auscultation, no wheezes, rales or rhonchi, symmetric air entry  CARDIOVASCULAR: regular rate and rhythm    GASTROINTESTINAL: soft, no palpable hernia in the right groin, no swelling, redness. Tenderness over the inguinal ligament that is very nonspecific. Small left inguinal hernia.  MUSCULOSKELETAL: no cyanosis, clubbing, or edema   NEUROLOGIC: alert and oriented, normal speech, cranial nerves 2-12 grossly intact, no focal deficits   SKIN: Moist, warm, no rashes, no jaundice. No lesions on right lower extremity      Diagnostic work-up:     Pertinent labs I personally reviewed:   Results from last 7 days   Lab Units 03/08/24  0440 03/07/24  0705 03/06/24  0451 03/05/24  0621 03/04/24  0511 03/03/24  0953   WBC 10*3/mm3 6.15 6.00 6.19 6.89 7.09 10.28   HEMOGLOBIN g/dL 8.6* 8.6* 8.7* 9.5* 8.7* 9.7*   HEMATOCRIT % 27.3* 26.3* 27.4* 29.9* 27.8* 30.8*   PLATELETS 10*3/mm3 190 164 181 178 164 177     Results from last 7 days   Lab Units 03/07/24  0705 03/06/24  0451 03/05/24  0621 " 03/04/24  0511 03/03/24  0953   SODIUM mmol/L 134* 134* 139   < > 138   POTASSIUM mmol/L 4.0 4.5 4.4   < > 4.0   CHLORIDE mmol/L 102 98 102   < > 101   CO2 mmol/L 25.0 23.3 29.0   < > 26.6   BUN mg/dL 19 39* 26*   < > 42*   CREATININE mg/dL 2.70* 4.24* 3.12*   < > 3.72*   CALCIUM mg/dL 8.1* 7.9* 8.4*   < > 8.2*   BILIRUBIN mg/dL  --   --   --   --  0.2   ALK PHOS U/L  --   --   --   --  199*   ALT (SGPT) U/L  --   --   --   --  9   AST (SGOT) U/L  --   --   --   --  11   GLUCOSE mg/dL 94 102* 101*   < > 204*    < > = values in this interval not displayed.       Imaging:  I personally reviewed the CT abdomen and pelvis from 3/8/2024, which shows a left-sided fat-containing inguinal hernia.  There is no evidence of hernia on the right.  No inflamed lymph nodes or other abnormalities in the pelvis to explain right-sided groin pain.  She does have degenerative bone changes in the hip.    Assessment and plan:   The patient is a 89 y.o. female with acute onset right groin pain.  I suspect this pain is either from a small inflamed lymph node or arthritis type pain from her hip.  I recommended ice and heat as well as pain medicine for comfort.      Ananya Sebastian MD  General, Robotic, and Endoscopic Surgery  [facility]     [address], [address]  [place], [place], [address]  P: [phone]  F: [phone]

## 2024-03-08 NOTE — PROGRESS NOTES
"    Name: Charlette Rai ADMIT: 3/3/2024   : 1935  PCP: Rafael Lyons MD    MRN: [id number] LOS: 0 days   AGE/SEX: 89 y.o. female  ROOM: [facility]     Subjective   Subjective   Patient sitting up in recliner, about to breakfast.  She is complaining of severe \"right leg pain\" when asked to clarify where the pain is she points at 1 exact spot in her right groin.       Objective   Objective   Vital Signs  Temp:  [97 °F (36.1 °C)-97.7 °F (36.5 °C)] 97.3 °F (36.3 °C)  Heart Rate:  [75-82] 76  Resp:  [16] 16  BP: (159-183)/(52-74) 173/69  SpO2:  [96 %-100 %] 100 %  on  Flow (L/min):  [3] 3;   Device (Oxygen Therapy): nasal cannula  Body mass index is 24.98 kg/m².  Physical Exam  Constitutional:       General: She is not in acute distress.     Appearance: She is ill-appearing (chronically).   Cardiovascular:      Rate and Rhythm: Normal rate and regular rhythm.   Pulmonary:      Effort: Pulmonary effort is normal. No respiratory distress.      Breath sounds: Normal breath sounds. No wheezing.   Abdominal:      General: Abdomen is flat. Bowel sounds are normal.      Palpations: Abdomen is soft.      Tenderness: There is no abdominal tenderness.   Musculoskeletal:         General: Tenderness (Right groin point tenderness, unable to palpate for hernia given severity of pain) present. No swelling.      Right lower leg: No edema.      Left lower leg: No edema.      Comments: RAVEN SAHA   Skin:     General: Skin is warm and dry.   Neurological:      General: No focal deficit present.      Mental Status: She is alert and oriented to person, place, and time. Mental status is at baseline.         Results Review     I reviewed the patient's new clinical results.  Results from last 7 days   Lab Units 24  0440 24  0705 24  0451 24  0621   WBC 10*3/mm3 6.15 6.00 6.19 6.89   HEMOGLOBIN g/dL 8.6* 8.6* 8.7* 9.5*   PLATELETS 10*3/mm3 190 164 181 178     Results from last 7 days   Lab Units 24  0705 " 03/06/24 0451 03/05/24  0621 03/04/24  0511   SODIUM mmol/L 134* 134* 139 138   POTASSIUM mmol/L 4.0 4.5 4.4 4.5   CHLORIDE mmol/L 102 98 102 101   CO2 mmol/L 25.0 23.3 29.0 25.0   BUN mg/dL 19 39* 26* 49*   CREATININE mg/dL 2.70* 4.24* 3.12* 4.54*   GLUCOSE mg/dL 94 102* 101* 116*   Estimated Creatinine Clearance: 15.8 mL/min (A) (by C-G formula based on SCr of 2.7 mg/dL (H)).  Results from last 7 days   Lab Units 03/07/24 0705 03/06/24 0451 03/05/24 0621 03/04/24  0511 03/03/24  0953   ALBUMIN g/dL 3.1* 3.0* 3.5 3.2* 3.6   BILIRUBIN mg/dL  --   --   --   --  0.2   ALK PHOS U/L  --   --   --   --  199*   AST (SGOT) U/L  --   --   --   --  11   ALT (SGPT) U/L  --   --   --   --  9     Results from last 7 days   Lab Units 03/07/24 0705 03/06/24 0451 03/05/24 0621 03/04/24  0511   CALCIUM mg/dL 8.1* 7.9* 8.4* 8.1*   ALBUMIN g/dL 3.1* 3.0* 3.5 3.2*   PHOSPHORUS mg/dL 3.6 4.3 4.1 4.3     Results from last 7 days   Lab Units 03/03/24 2027   LACTATE mmol/L 1.4     COVID19   Date Value Ref Range Status   11/09/2023 Not Detected Not Detected - Ref. Range Final   10/29/2023 Not Detected Not Detected - Ref. Range Final     Glucose   Date/Time Value Ref Range Status   03/08/2024 0645 91 70 - 130 mg/dL Final   03/07/2024 2131 128 70 - 130 mg/dL Final   03/07/2024 1559 124 70 - 130 mg/dL Final   03/07/2024 1106 102 70 - 130 mg/dL Final   03/07/2024 0616 104 70 - 130 mg/dL Final   03/06/2024 2130 175 (H) 70 - 130 mg/dL Final   03/06/2024 1829 92 70 - 130 mg/dL Final       CT Abdomen Pelvis With & Without Contrast  CT SCAN OF THE ABDOMEN AND PELVIS WITHOUT AND WITH INTRAVENOUS CONTRAST     HISTORY: 89-year-old female with large right capsular hematoma. Has  recent noncontrast CT scan demonstrating a suspicious exophytic mass  extending from the lower pole of the right kidney. Several bilateral  renal cysts are also present. Previous history of lung cancer.     The CT scan was performed through the abdomen and pelvis  without and  with intravenous contrast and compared to the recent noncontrast CT scan  performed 2 days ago and an outside PET fusion CT scan dated 11/01/2021  as well as earlier studies from [facility]. The following  findings are present:  1. Again noted is a large mixed-density capsular hematoma involving the  right kidney, particularly at its posterior and lateral margins. It  measures up to 3.6 cm in thickness and direct comparison with the recent  CT scan shows no significant change.  2. There is a solid-appearing exophytic mass extending from the lower  pole of the left kidney with a small central area of slight lucency.  This mass measures up to 3.4 x 3.7 x 3.7 cm. It does not show cortical  enhancement similar to the kidney following contrast. This mass can also  be seen on the outside PET fusion CT scan dated 11/01/2021 where it  measured 2.5 x 2.3 x 2.5 cm. This is therefore somewhat suspicious for a  slow-growing renal neoplasm. There our several bilateral renal cysts  that are unchanged. We have also now been able to compare the current  exam with CT angiography of the abdomen and pelvis at [facility] dating back to 07/31/2018. At that time there was a more  benign-appearing cyst extending from the lower pole measuring 1.4 x 1.8  x 1.6 cm. It is therefore conceivable that the current solid-appearing  mass could represent hemorrhage within a cyst as part of the larger  adjacent capsular hematoma. Continued surveillance on follow-up CT scan  may therefore be more prudent at this time.     3. There is some dense pleural calcification in the lower left lung  posteriorly with some adjacent scarring unchanged from 2021. The liver,  spleen, pancreas, and both adrenal glands are unremarkable. 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 This shows no  significant change from 2021. The extensive  calcification causes some  luminal narrowing of the aorta below the level of the aneurysm. There  are bilateral common iliac stents present without change.     4. There is no retroperitoneal lymphadenopathy. The large and small  bowel loops are normal in caliber with a moderate amount of stool  scattered in the colon. No abnormality is seen in the pelvis. The  urinary bladder has a smooth contour.     5. At bone windows, no suspicious bone lesions are seen.     Radiation dose reduction techniques were utilized, including automated  exposure control and exposure modulation based on body size.          Scheduled Medications  budesonide-formoterol, 2 puff, Inhalation, BID - RT  carvedilol, 25 mg, Oral, Q12H  cefTRIAXone, 1,000 mg, Intravenous, Q24H  hydrALAZINE, 25 mg, Oral, Q12H  insulin lispro, 2-7 Units, Subcutaneous, 4x Daily AC & at Bedtime  pantoprazole, 40 mg, Oral, BID AC  polycarbophil, 625 mg, Oral, Daily  rosuvastatin, 20 mg, Oral, Nightly  sevelamer, 800 mg, Oral, TID With Meals    Infusions   Diet  Diet: Regular/House; Fluid Consistency: Thin (IDDSI 0)         I have personally reviewed:  [x]  Laboratory   []  Microbiology   [x]  Radiology   [x]  EKG/Telemetry   []  Cardiology/Vascular   []  Pathology   []  Records    Assessment/Plan     Active Hospital Problems    Diagnosis  POA    **Hematoma [T14.8XXA]  Yes    ESRD (end stage renal disease) [N18.6]  Unknown    Essential hypertension [I10]  Yes    Type 2 diabetes mellitus [E11.9]  Unknown      Resolved Hospital Problems   No resolved problems to display.       89 y.o. female admitted with Hematoma.    Subcapsular hematoma, right  -Hemoglobin is stable, continue with conservative management   -Repeat CT with stable hematoma    Renal mass  - visualized on f/u CT  - CT renal protocol this morning done pending read; HD to be done today    Right groin tenderness   - pt with R groin oint tenderness; attempted to palpate for hernia but patient with severe  pain  - CT from 3/3 w LEFT inguinal hernia; none noted on R  - will ask surgery to see; rajeev Sebastian     ESRD on HD  Anemia of chronic disease  Metabolic bone disease  -Nephrology consulted  -Dialysis per renal  -Continue Phos binders    Type 2 diabetes  -Continue sliding scale insulin; adjust as needed  -A1c 10/23 7.8%    Hypertension  -Continue Coreg, hydralazine    SCDs for DVT prophylaxis.  Full code.  Discussed with patient.  Anticipate discharge  tbd   tbd      Emily Chandra MD  [facility]  03/08/24  09:14 EST    I wore protective equipment throughout this patient encounter including gloves.  Hand hygiene was performed before donning protective equipment and after removal when leaving the room.    Expected Discharge Date and Time       Expected Discharge Date Expected Discharge Time    Mar 6, 2024              Copied text in this note has been reviewed and is accurate as of 03/08/24.

## 2024-03-08 NOTE — PROGRESS NOTES
LOS: 0 days   Patient Care Team:  Rafael Lyons MD as PCP - General (Internal Medicine)  Regis Mckeon MD as Consulting Physician (Radiation Oncology)  Carlton Casillas MD as Consulting Physician (Gastroenterology)  Susan Marcos APRN as Nurse Practitioner (Gastroenterology)      Subjective   Interval History: Right groin pain    Objective     ROS   12 POINT NEG ROS PERTINENT IN HPI      Vital Signs  Temp:  [96.9 °F (36.1 °C)-97.7 °F (36.5 °C)] 97.5 °F (36.4 °C)  Heart Rate:  [75-82] 76  Resp:  [16] 16  BP: (135-183)/(52-74) 160/60      Intake/Output Summary (Last 24 hours) at 3/8/2024 1635  Last data filed at 3/7/2024 2004  Gross per 24 hour   Intake 100 ml   Output --   Net 100 ml       Flowsheet Rows      Flowsheet Row First Filed Value   Admission Height --   Admission Weight 68.1 kg (150 lb 2.1 oz) Documented at 03/06/2024 1751            Physical Exam:     General appearance: alert, cooperative, oriented      Lab Results (all)       Procedure Component Value Units Date/Time    POC Glucose Once [[id number]]  (Abnormal) Collected: 03/08/24 1205    Specimen: Blood Updated: 03/08/24 1206     Glucose 149 mg/dL     POC Glucose Once [[id number]]  (Normal) Collected: 03/08/24 0645    Specimen: Blood Updated: 03/08/24 0646     Glucose 91 mg/dL     CBC (No Diff) [[id number]]  (Abnormal) Collected: 03/08/24 0440    Specimen: Blood Updated: 03/08/24 0519     WBC 6.15 10*3/mm3      RBC 2.87 10*6/mm3      Hemoglobin 8.6 g/dL      Hematocrit 27.3 %      MCV 95.1 fL      MCH 30.0 pg      MCHC 31.5 g/dL      RDW 13.5 %      RDW-SD 46.6 fl      MPV 9.0 fL      Platelets 190 10*3/mm3     POC Glucose Once [[id number]]  (Normal) Collected: 03/07/24 2131    Specimen: Blood Updated: 03/07/24 2132     Glucose 128 mg/dL     Blood Culture - Blood, Arm, Left [[id number]]  (Normal) Collected: 03/03/24 2040    Specimen: Blood from Arm, Left Updated: 03/07/24 2116     Blood Culture No growth at 4 days    Blood Culture -  Blood, Arm, Right [[id number]]  (Normal) Collected: 03/03/24 2027    Specimen: Blood from Arm, Right Updated: 03/07/24 2116     Blood Culture No growth at 4 days    POC Glucose Once [[id number]]  (Normal) Collected: 03/07/24 1559    Specimen: Blood Updated: 03/07/24 1600     Glucose 124 mg/dL     POC Glucose Once [[id number]]  (Normal) Collected: 03/07/24 1106    Specimen: Blood Updated: 03/07/24 1108     Glucose 102 mg/dL     Renal Function Panel [[id number]]  (Abnormal) Collected: 03/07/24 0705    Specimen: Blood Updated: 03/07/24 0813     Glucose 94 mg/dL      BUN 19 mg/dL      Creatinine 2.70 mg/dL      Sodium 134 mmol/L      Potassium 4.0 mmol/L      Chloride 102 mmol/L      CO2 25.0 mmol/L      Calcium 8.1 mg/dL      Albumin 3.1 g/dL      Phosphorus 3.6 mg/dL      Anion Gap 7.0 mmol/L      BUN/Creatinine Ratio 7.0     eGFR 16.6 mL/min/1.73     Narrative:      GFR Normal >60  Chronic Kidney Disease <60  Kidney Failure <15    The GFR formula is only valid for adults with stable renal function between ages 18 and 70.    CBC & Differential [[id number]]  (Abnormal) Collected: 03/07/24 0705    Specimen: Blood Updated: 03/07/24 0737    Narrative:      The following orders were created for panel order CBC & Differential.  Procedure                               Abnormality         Status                     ---------                               -----------         ------                     CBC Auto Differential[[id number]]        Abnormal            Final result                 Please view results for these tests on the individual orders.    CBC Auto Differential [[id number]]  (Abnormal) Collected: 03/07/24 0705    Specimen: Blood Updated: 03/07/24 0737     WBC 6.00 10*3/mm3      RBC 2.74 10*6/mm3      Hemoglobin 8.6 g/dL      Hematocrit 26.3 %      MCV 96.0 fL      MCH 31.4 pg      MCHC 32.7 g/dL      RDW 13.4 %      RDW-SD 46.0 fl      MPV 9.2 fL      Platelets 164 10*3/mm3      Neutrophil % 63.7 %      Lymphocyte %  15.2 %      Monocyte % 16.3 %      Eosinophil % 4.0 %      Basophil % 0.5 %      Immature Grans % 0.3 %      Neutrophils, Absolute 3.82 10*3/mm3      Lymphocytes, Absolute 0.91 10*3/mm3      Monocytes, Absolute 0.98 10*3/mm3      Eosinophils, Absolute 0.24 10*3/mm3      Basophils, Absolute 0.03 10*3/mm3      Immature Grans, Absolute 0.02 10*3/mm3      nRBC 0.0 /100 WBC     POC Glucose Once [[id number]]  (Normal) Collected: 03/07/24 0616    Specimen: Blood Updated: 03/07/24 0617     Glucose 104 mg/dL     POC Glucose Once [[id number]]  (Abnormal) Collected: 03/06/24 2130    Specimen: Blood Updated: 03/06/24 2132     Glucose 175 mg/dL     POC Glucose Once [[id number]]  (Normal) Collected: 03/06/24 1829    Specimen: Blood Updated: 03/06/24 1830     Glucose 92 mg/dL     POC Glucose Once [[id number]]  (Abnormal) Collected: 03/06/24 1057    Specimen: Blood Updated: 03/06/24 1058     Glucose 149 mg/dL     Renal Function Panel [[id number]]  (Abnormal) Collected: 03/06/24 0451    Specimen: Blood Updated: 03/06/24 0613     Glucose 102 mg/dL      BUN 39 mg/dL      Creatinine 4.24 mg/dL      Sodium 134 mmol/L      Potassium 4.5 mmol/L      Chloride 98 mmol/L      CO2 23.3 mmol/L      Calcium 7.9 mg/dL      Albumin 3.0 g/dL      Phosphorus 4.3 mg/dL      Anion Gap 12.7 mmol/L      BUN/Creatinine Ratio 9.2     eGFR 9.7 mL/min/1.73      Comment: <15 Indicative of kidney failure       Narrative:      GFR Normal >60  Chronic Kidney Disease <60  Kidney Failure <15    The GFR formula is only valid for adults with stable renal function between ages 18 and 70.    POC Glucose Once [[id number]]  (Normal) Collected: 03/06/24 0551    Specimen: Blood Updated: 03/06/24 0553     Glucose 95 mg/dL     CBC & Differential [[id number]]  (Abnormal) Collected: 03/06/24 0451    Specimen: Blood Updated: 03/06/24 0529    Narrative:      The following orders were created for panel order CBC & Differential.  Procedure                               Abnormality          Status                     ---------                               -----------         ------                     CBC Auto Differential[[id number]]        Abnormal            Final result                 Please view results for these tests on the individual orders.    CBC Auto Differential [[id number]]  (Abnormal) Collected: 03/06/24 0451    Specimen: Blood Updated: 03/06/24 0529     WBC 6.19 10*3/mm3      RBC 2.94 10*6/mm3      Hemoglobin 8.7 g/dL      Hematocrit 27.4 %      MCV 93.2 fL      MCH 29.6 pg      MCHC 31.8 g/dL      RDW 13.1 %      RDW-SD 44.7 fl      MPV 9.0 fL      Platelets 181 10*3/mm3      Neutrophil % 64.9 %      Lymphocyte % 15.7 %      Monocyte % 14.9 %      Eosinophil % 3.7 %      Basophil % 0.5 %      Immature Grans % 0.3 %      Neutrophils, Absolute 4.02 10*3/mm3      Lymphocytes, Absolute 0.97 10*3/mm3      Monocytes, Absolute 0.92 10*3/mm3      Eosinophils, Absolute 0.23 10*3/mm3      Basophils, Absolute 0.03 10*3/mm3      Immature Grans, Absolute 0.02 10*3/mm3      nRBC 0.0 /100 WBC     POC Glucose Once [[id number]]  (Abnormal) Collected: 03/05/24 2113    Specimen: Blood Updated: 03/05/24 2115     Glucose 132 mg/dL     POC Glucose Once [[id number]]  (Abnormal) Collected: 03/05/24 2049    Specimen: Blood Updated: 03/05/24 2050     Glucose 133 mg/dL     POC Glucose Once [[id number]]  (Normal) Collected: 03/05/24 1703    Specimen: Blood Updated: 03/05/24 1704     Glucose 92 mg/dL     POC Glucose Once [[id number]]  (Abnormal) Collected: 03/05/24 1138    Specimen: Blood Updated: 03/05/24 1141     Glucose 174 mg/dL     Renal Function Panel [[id number]]  (Abnormal) Collected: 03/05/24 0621    Specimen: Blood Updated: 03/05/24 0745     Glucose 101 mg/dL      BUN 26 mg/dL      Creatinine 3.12 mg/dL      Sodium 139 mmol/L      Potassium 4.4 mmol/L      Chloride 102 mmol/L      CO2 29.0 mmol/L      Calcium 8.4 mg/dL      Albumin 3.5 g/dL      Phosphorus 4.1 mg/dL      Anion Gap 8.0 mmol/L       BUN/Creatinine Ratio 8.3     eGFR 13.9 mL/min/1.73      Comment: <15 Indicative of kidney failure       Narrative:      GFR Normal >60  Chronic Kidney Disease <60  Kidney Failure <15    The GFR formula is only valid for adults with stable renal function between ages 18 and 70.    CBC & Differential [[id number]]  (Abnormal) Collected: 03/05/24 0621    Specimen: Blood Updated: 03/05/24 0728    Narrative:      The following orders were created for panel order CBC & Differential.  Procedure                               Abnormality         Status                     ---------                               -----------         ------                     CBC Auto Differential[[id number]]        Abnormal            Final result                 Please view results for these tests on the individual orders.    CBC Auto Differential [[id number]]  (Abnormal) Collected: 03/05/24 0621    Specimen: Blood Updated: 03/05/24 0728     WBC 6.89 10*3/mm3      RBC 3.09 10*6/mm3      Hemoglobin 9.5 g/dL      Hematocrit 29.9 %      MCV 96.8 fL      MCH 30.7 pg      MCHC 31.8 g/dL      RDW 13.6 %      RDW-SD 48.8 fl      MPV 9.6 fL      Platelets 178 10*3/mm3      Neutrophil % 65.1 %      Lymphocyte % 14.8 %      Monocyte % 16.8 %      Eosinophil % 2.6 %      Basophil % 0.6 %      Immature Grans % 0.1 %      Neutrophils, Absolute 4.48 10*3/mm3      Lymphocytes, Absolute 1.02 10*3/mm3      Monocytes, Absolute 1.16 10*3/mm3      Eosinophils, Absolute 0.18 10*3/mm3      Basophils, Absolute 0.04 10*3/mm3      Immature Grans, Absolute 0.01 10*3/mm3      nRBC 0.0 /100 WBC     POC Glucose Once [[id number]]  (Normal) Collected: 03/05/24 0610    Specimen: Blood Updated: 03/05/24 0612     Glucose 93 mg/dL     POC Glucose Once [[id number]]  (Abnormal) Collected: 03/04/24 2140    Specimen: Blood Updated: 03/04/24 2141     Glucose 165 mg/dL     POC Glucose Once [[id number]]  (Normal) Collected: 03/04/24 1812    Specimen: Blood Updated: 03/04/24 1814      Glucose 114 mg/dL     POC Glucose Once [[id number]]  (Abnormal) Collected: 03/04/24 1105    Specimen: Blood Updated: 03/04/24 1107     Glucose 147 mg/dL     Renal Function Panel [[id number]]  (Abnormal) Collected: 03/04/24 0511    Specimen: Blood Updated: 03/04/24 0615     Glucose 116 mg/dL      BUN 49 mg/dL      Creatinine 4.54 mg/dL      Sodium 138 mmol/L      Potassium 4.5 mmol/L      Chloride 101 mmol/L      CO2 25.0 mmol/L      Calcium 8.1 mg/dL      Albumin 3.2 g/dL      Phosphorus 4.3 mg/dL      Anion Gap 12.0 mmol/L      BUN/Creatinine Ratio 10.8     eGFR 8.9 mL/min/1.73      Comment: <15 Indicative of kidney failure       Narrative:      GFR Normal >60  Chronic Kidney Disease <60  Kidney Failure <15    The GFR formula is only valid for adults with stable renal function between ages 18 and 70.    POC Glucose Once [[id number]]  (Normal) Collected: 03/04/24 0606    Specimen: Blood Updated: 03/04/24 0608     Glucose 97 mg/dL     CBC Auto Differential [[id number]]  (Abnormal) Collected: 03/04/24 0511    Specimen: Blood Updated: 03/04/24 0558     WBC 7.09 10*3/mm3      RBC 2.84 10*6/mm3      Hemoglobin 8.7 g/dL      Hematocrit 27.8 %      MCV 97.9 fL      MCH 30.6 pg      MCHC 31.3 g/dL      RDW 13.6 %      RDW-SD 48.3 fl      MPV 9.6 fL      Platelets 164 10*3/mm3      Neutrophil % 67.7 %      Lymphocyte % 14.2 %      Monocyte % 15.2 %      Eosinophil % 2.5 %      Basophil % 0.3 %      Immature Grans % 0.1 %      Neutrophils, Absolute 4.79 10*3/mm3      Lymphocytes, Absolute 1.01 10*3/mm3      Monocytes, Absolute 1.08 10*3/mm3      Eosinophils, Absolute 0.18 10*3/mm3      Basophils, Absolute 0.02 10*3/mm3      Immature Grans, Absolute 0.01 10*3/mm3      nRBC 0.0 /100 WBC     Lactic Acid, Plasma [[id number]]  (Normal) Collected: 03/03/24 2027    Specimen: Blood Updated: 03/03/24 2129     Lactate 1.4 mmol/L     POC Glucose Once [[id number]]  (Abnormal) Collected: 03/03/24 2024    Specimen: Blood Updated: 03/03/24 2026      Glucose 174 mg/dL             Imaging Results (All)       Procedure Component Value Units Date/Time    CT Abdomen Pelvis With & Without Contrast [[id number]] Collected: 03/08/24 0903     Updated: 03/08/24 1012    Narrative:      CT SCAN OF THE ABDOMEN AND PELVIS WITHOUT AND WITH INTRAVENOUS CONTRAST     HISTORY: 89-year-old female with large right capsular hematoma. Has  recent noncontrast CT scan demonstrating a suspicious exophytic mass  extending from the lower pole of the right kidney. Several bilateral  renal cysts are also present. Previous history of lung cancer.     The CT scan was performed through the abdomen and pelvis without and  with intravenous contrast and compared to the recent noncontrast CT scan  performed 2 days ago and an outside PET fusion CT scan dated 11/01/2021  as well as earlier studies from [facility]. The following  findings are present:  1. Again noted is a large mixed-density capsular hematoma involving the  right kidney, particularly at its posterior and lateral margins. It  measures up to 3.6 cm in thickness and direct comparison with the recent  CT scan shows no significant change.  2. There is a solid-appearing exophytic mass extending from the lower  pole of the left kidney with a small central area of slight lucency.  This mass measures up to 3.4 x 3.7 x 3.7 cm. It does not show cortical  enhancement similar to the kidney following contrast. This mass can also  be seen on the outside PET fusion CT scan dated 11/01/2021 where it  measured 2.5 x 2.3 x 2.5 cm. This is therefore somewhat suspicious for a  slow-growing renal neoplasm. There our several bilateral renal cysts  that are unchanged. We have also now been able to compare the current  exam with CT angiography of the abdomen and pelvis at [facility] dating back to 07/31/2018. At that time there was a more  benign-appearing cyst extending from the lower pole measuring 1.4 x 1.8  x 1.6 cm. It is  therefore conceivable that the current solid-appearing  mass could represent hemorrhage within a cyst as part of the larger  adjacent capsular hematoma. Continued surveillance on follow-up CT scan  may therefore be more prudent at this time.     3. There is some dense pleural calcification in the lower left lung  posteriorly with some adjacent scarring unchanged from 2021. The liver,  spleen, pancreas, and both adrenal glands are unremarkable. 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 This shows no  significant change from 2021. The extensive calcification causes some  luminal narrowing of the aorta below the level of the aneurysm. There  are bilateral common iliac stents present without change.     4. There is no retroperitoneal lymphadenopathy. The large and small  bowel loops are normal in caliber with a moderate amount of stool  scattered in the colon. No abnormality is seen in the pelvis. The  urinary bladder has a smooth contour.     5. At bone windows, no suspicious bone lesions are seen.     Radiation dose reduction techniques were utilized, including automated  exposure control and exposure modulation based on body size.        This report was finalized on 3/8/2024 10:09 AM by Dr. Cj Paula M.D  on Workstation: [id number]       CT Abdomen Pelvis Without Contrast [[id number]] Collected: 03/06/24 1408     Updated: 03/06/24 1519    Narrative:      CT ABDOMEN PELVIS WO CONTRAST-     HISTORY: 89 years Female Right sandrita-nephric hematoma     TECHNIQUE:  CT includes axial imaging from the lung bases to the  trochanters without intravenous contrast and without use of oral  contrast. Data reconstructed in coronal and sagittal planes. Radiation  dose reduction techniques were utilized, including automated exposure  control and exposure modulation based on body size.     COMPARISON: Correlation with CT abdomen and pelvis  report 03/03/2024.  PET/CT 04/29/2021.     FINDINGS: There is hyperdense material surrounding the majority of the  right kidney centered posterior and lateral to the right kidney  consistent with subcapsular hematoma. Posterolateral to the right lower  pole this measures approximately 3.3 cm in thickness and extends from  above the upper pole to below the lower pole. At the right renal lower  pole there is more of an exophytic-appearing area of increased density  potentially representing a renal mass and this measures approximately  3.4 x 3.2 cm and further evaluation with CT with and without contrast or  MRI is recommended to evaluate for renal mass. There are several  hyperdense left renal lesions without evidence for acute left renal  abnormality.     The spleen, liver, adrenal glands, pancreas appear with normal limits.  Small gallstones layer dependently within the gallbladder. There is  moderate distention of the the colon with stool particularly involving  the cecum and ascending colon. There is no evidence for bowel  obstruction.     Aortobiiliac stent graft is present. There are extensive atherosclerotic  calcifications. There is a chronic left posterior basilar pleural-based  calcification with adjacent pleural parenchymal scarring.       Impression:      1. Right renal subcapsular hematoma measures up to 3.3 cm in thickness  which is similar to that described on the CT abdomen and pelvis report 3  days ago.  2. 3.4 cm exophytic masslike density right lower pole suspicious for a  solid renal mass and is a potential source for hematoma. Recommend  further evaluation with CT or MRI with and without contrast to evaluate  for a solid lesion. At that time, additional hyperdense bilateral renal  smaller lesions could be evaluated.  3. Cholelithiasis.  4. Moderate stool throughout the colon consistent with constipation.  5. Chronic left lung base pleural parenchymal scarring with  calcification.      Radiation  dose reduction techniques were utilized, including automated  exposure control and exposure modulation based on body size.        This report was finalized on 3/6/2024 3:16 PM by Dr. Naseem Mancera M.D on Workstation: [id number]               Medication Review:   Current Facility-Administered Medications   Medication Dose Route Frequency Provider Last Rate Last Admin    acetaminophen (TYLENOL) tablet 650 mg  650 mg Oral Q4H PRN Lavern Del Rosario MD   650 mg at 03/08/24 0348    Or    acetaminophen (TYLENOL) 160 MG/5ML oral solution 650 mg  650 mg Oral Q4H PRN Lavern Del Rosario MD        Or    acetaminophen (TYLENOL) suppository 650 mg  650 mg Rectal Q4H PRN Lavern Del Rosario MD        albuterol (PROVENTIL) nebulizer solution 0.083% 2.5 mg/3mL  2.5 mg Nebulization Q6H PRN Lavern Del Rosario MD        sennosides-docusate (PERICOLACE) 8.6-50 MG per tablet 2 tablet  2 tablet Oral BID PRN Lavern Del Rosario MD   2 tablet at 03/06/24 1817    And    polyethylene glycol (MIRALAX) packet 17 g  17 g Oral Daily PRN Lavern Del Rosario MD   17 g at 03/07/24 0630    And    bisacodyl (DULCOLAX) EC tablet 5 mg  5 mg Oral Daily PRN Lavern Del Rosario MD   5 mg at 03/07/24 1853    And    bisacodyl (DULCOLAX) suppository 10 mg  10 mg Rectal Daily PRN Lavern Del Rosario MD   10 mg at 03/08/24 0630    budesonide-formoterol (SYMBICORT) 160-4.5 MCG/ACT inhaler 2 puff  2 puff Inhalation BID - RT Lavern Del Rosario MD   2 puff at 03/07/24 2006    carvedilol (COREG) tablet 25 mg  25 mg Oral Q12H Lavern Del Rosario MD   25 mg at 03/08/24 1056    cefTRIAXone (ROCEPHIN) 1,000 mg in sodium chloride 0.9 % 100 mL IVPB-VTB  1,000 mg Intravenous Q24H Lavern Del Rosario  mL/hr at 03/07/24 2004 1,000 mg at 03/07/24 2004    dextrose (D50W) (25 g/50 mL) IV injection 25 g  25 g Intravenous Q15 Min PRN Lavern Del Rosario MD        dextrose (GLUTOSE) oral gel 15 g  15 g Oral Q15 Min PRN Stingl,  Lavern Rodriguez MD        glucagon (GLUCAGEN) injection 1 mg  1 mg Intramuscular Q15 Min PRN Lavern Del Rosario MD        hydrALAZINE (APRESOLINE) tablet 25 mg  25 mg Oral Q12H Dakota Morgan MD   25 mg at 03/08/24 1057    HYDROcodone-acetaminophen (NORCO) 7.5-325 MG per tablet 1 tablet  1 tablet Oral Q6H PRN Lavern Del Rosario MD   1 tablet at 03/08/24 1216    insulin lispro (HUMALOG/ADMELOG) injection 2-7 Units  2-7 Units Subcutaneous 4x Daily AC & at Bedtime Lavern Del Rosario MD   2 Units at 03/06/24 2150    lidocaine (LMX) 4 % cream 1 Application  1 Application Topical PRN ShonMatt MD   1 Application at 03/08/24 1216    ondansetron (ZOFRAN) injection 4 mg  4 mg Intravenous Q6H PRN Lavern Del Rosario MD   4 mg at 03/03/24 1746    pantoprazole (PROTONIX) EC tablet 40 mg  40 mg Oral BID AC Lavern Del Rosario MD   40 mg at 03/08/24 0630    polycarbophil tablet 625 mg  625 mg Oral Daily Lavern Del Rosario MD   625 mg at 03/08/24 1056    rosuvastatin (CRESTOR) tablet 20 mg  20 mg Oral Nightly Lavern Del Rosario MD   20 mg at 03/07/24 2004    sevelamer (RENVELA) tablet 800 mg  800 mg Oral TID With Meals Lavern Del Rosario MD   800 mg at 03/08/24 1216       Current Facility-Administered Medications:     acetaminophen (TYLENOL) tablet 650 mg, 650 mg, Oral, Q4H PRN, 650 mg at 03/08/24 0348 **OR** acetaminophen (TYLENOL) 160 MG/5ML oral solution 650 mg, 650 mg, Oral, Q4H PRN **OR** acetaminophen (TYLENOL) suppository 650 mg, 650 mg, Rectal, Q4H PRN, StingLavern barker MD    albuterol (PROVENTIL) nebulizer solution 0.083% 2.5 mg/3mL, 2.5 mg, Nebulization, Q6H PRN, [place], Lavern Rodriguez MD    sennosides-docusate (PERICOLACE) 8.6-50 MG per tablet 2 tablet, 2 tablet, Oral, BID PRN, 2 tablet at 03/06/24 1817 **AND** polyethylene glycol (MIRALAX) packet 17 g, 17 g, Oral, Daily PRN, 17 g at 03/07/24 0630 **AND** bisacodyl (DULCOLAX) EC tablet 5 mg, 5 mg, Oral, Daily  PRN, 5 mg at 03/07/24 1853 **AND** bisacodyl (DULCOLAX) suppository 10 mg, 10 mg, Rectal, Daily PRN, Lavern Del Rosario MD, 10 mg at 03/08/24 0630    budesonide-formoterol (SYMBICORT) 160-4.5 MCG/ACT inhaler 2 puff, 2 puff, Inhalation, BID - RT, Lavern Del Rosario MD, 2 puff at 03/07/24 2006    carvedilol (COREG) tablet 25 mg, 25 mg, Oral, Q12H, Lavern Del Rosario MD, 25 mg at 03/08/24 1056    cefTRIAXone (ROCEPHIN) 1,000 mg in sodium chloride 0.9 % 100 mL IVPB-VTB, 1,000 mg, Intravenous, Q24H, Lavern Del Rosario MD, Last Rate: 200 mL/hr at 03/07/24 2004, 1,000 mg at 03/07/24 2004    dextrose (D50W) (25 g/50 mL) IV injection 25 g, 25 g, Intravenous, Q15 Min PRN, Lavern Del Rosario MD    dextrose (GLUTOSE) oral gel 15 g, 15 g, Oral, Q15 Min PRN, Lavern Del Rosario MD    glucagon (GLUCAGEN) injection 1 mg, 1 mg, Intramuscular, Q15 Min PRN, Lavern Del Rosario MD    hydrALAZINE (APRESOLINE) tablet 25 mg, 25 mg, Oral, Q12H, Dakota Morgan MD, 25 mg at 03/08/24 1057    HYDROcodone-acetaminophen (NORCO) 7.5-325 MG per tablet 1 tablet, 1 tablet, Oral, Q6H PRN, Lavern Del Rosario MD, 1 tablet at 03/08/24 1216    insulin lispro (HUMALOG/ADMELOG) injection 2-7 Units, 2-7 Units, Subcutaneous, 4x Daily AC & at Bedtime, Lavern Del Rosario MD, 2 Units at 03/06/24 2150    lidocaine (LMX) 4 % cream 1 Application, 1 Application, Topical, PRN, ShonMatt MD, 1 Application at 03/08/24 1216    ondansetron (ZOFRAN) injection 4 mg, 4 mg, Intravenous, Q6H PRN, Lavern Del Rosario MD, 4 mg at 03/03/24 1746    pantoprazole (PROTONIX) EC tablet 40 mg, 40 mg, Oral, BID AC, Lavern Del Rosario MD, 40 mg at 03/08/24 0630    polycarbophil tablet 625 mg, 625 mg, Oral, Daily, Lavern Del Rosario MD, 625 mg at 03/08/24 1056    rosuvastatin (CRESTOR) tablet 20 mg, 20 mg, Oral, Nightly, Lavern Del Rosario MD, 20 mg at 03/07/24 2004    sevelamer (RENVELA) tablet 800 mg, 800 mg,  Oral, TID With Meals, Lavern Del Rosario MD, 800 mg at 03/08/24 1216  Medications Discontinued During This Encounter   Medication Reason    carvedilol (COREG) tablet 25 mg     bumetanide (BUMEX) 1 MG tablet     hydrALAZINE (APRESOLINE) 25 MG tablet      CT indpeendently viewed. Possible renal mass arising from the lower pole of the right kidney. Subcapsular hematoma unchanged.    Assessment & Plan   Right renal mass      Hematoma    Essential hypertension    Type 2 diabetes mellitus    ESRD (end stage renal disease)        Plan   - the right renal mass may represent slow growing renal neoplasm or hemorrhagic cyst. Recommend repeat imaging in 6 weeks to assess stability.   - safe for discharge from urology standpoint. Will sign off. Please call with questions or concerns.    León Neville Jr., MD  03/08/24  16:35 EST

## 2024-03-08 NOTE — PROGRESS NOTES
Continued Stay Note  [facility]     Patient Name: Charlette Rai  MRN: [id number]  Today's Date: 3/8/2024    Admit Date: 3/3/2024    Plan: Home and continue with HD at [facility] in [place] on [facility]   Discharge Plan       Row Name 03/08/24 1313       Plan    Plan Home and continue with HD at [facility] in [place] on [facility]    Plan Comments Spoke to patient at bedside who confirmed DC plan is to return to home and continue with HD at [facility] in [place] on MWF. Stated she is independent with ADL's. Stated her spouse will assist as needed and will provide transportation at [place]. Denies any needs/equipment.                   Discharge Codes    No documentation.                 Expected Discharge Date and Time       Expected Discharge Date Expected Discharge Time    Mar 9, 2024               Sanjuana Dsouza RN

## 2024-03-08 NOTE — PROGRESS NOTES
[facility] Progress Note      Patient Name: Charlette Rai  : 1935  MRN: [id number]  Primary Care Physician:  Rafael Lyons MD  Date of admission: 3/3/2024    Subjective     Interval History:   Follow-up end-stage renal disease    Patient sitting in a recliner   Continues to have discomfort on the right flank  Denies any chest pain, shortness of breath or palpitations  Tolerating oral intake    Patient underwent CT scan abdomen pelvis overnight awaiting urology recommendations,     Review of Systems:   As noted above    Objective     Vitals:   Temp:  [96.9 °F (36.1 °C)-97.7 °F (36.5 °C)] 96.9 °F (36.1 °C)  Heart Rate:  [75-82] 78  Resp:  [16] 16  BP: (135-183)/(52-74) 135/66  Flow (L/min):  [3] 3    Intake/Output Summary (Last 24 hours) at 3/8/2024 1235  Last data filed at 3/7/2024 2004  Gross per 24 hour   Intake 100 ml   Output --   Net 100 ml       Physical Exam:    General Appearance: Awake, alert, chronically ill, no acute  Skin: warm and dry  HEENT: oral mucosa normal, nonicteric sclera  Neck: supple, no JVD  Lungs: CTA, unlabored breathing effort  Heart: RRR, normal S1 and S2, no rub  Abdomen: soft, nontender, nondistended  : no palpable bladder, she had mild right CVA tenderness  Extremities: no edema, cyanosis or clubbing, functional AV graft in the left upper arm    Scheduled Meds:     budesonide-formoterol, 2 puff, Inhalation, BID - RT  carvedilol, 25 mg, Oral, Q12H  cefTRIAXone, 1,000 mg, Intravenous, Q24H  hydrALAZINE, 25 mg, Oral, Q12H  insulin lispro, 2-7 Units, Subcutaneous, 4x Daily AC & at Bedtime  pantoprazole, 40 mg, Oral, BID AC  polycarbophil, 625 mg, Oral, Daily  rosuvastatin, 20 mg, Oral, Nightly  sevelamer, 800 mg, Oral, TID With Meals      IV Meds:        Results Reviewed:   I have personally reviewed the results from the time of this admission to 3/8/2024 12:35 EST     Results from last 7 days   Lab Units 24  0705 24  0451 24  0621  03/04/24  0511 03/03/24  0953   SODIUM mmol/L 134* 134* 139   < > 138   POTASSIUM mmol/L 4.0 4.5 4.4   < > 4.0   CHLORIDE mmol/L 102 98 102   < > 101   CO2 mmol/L 25.0 23.3 29.0   < > 26.6   BUN mg/dL 19 39* 26*   < > 42*   CREATININE mg/dL 2.70* 4.24* 3.12*   < > 3.72*   CALCIUM mg/dL 8.1* 7.9* 8.4*   < > 8.2*   BILIRUBIN mg/dL  --   --   --   --  0.2   ALK PHOS U/L  --   --   --   --  199*   ALT (SGPT) U/L  --   --   --   --  9   AST (SGOT) U/L  --   --   --   --  11   GLUCOSE mg/dL 94 102* 101*   < > 204*    < > = values in this interval not displayed.       Estimated Creatinine Clearance: 15.8 mL/min (A) (by C-G formula based on SCr of 2.7 mg/dL (H)).    Results from last 7 days   Lab Units 03/07/24  0705 03/06/24  0451 03/05/24  0621   PHOSPHORUS mg/dL 3.6 4.3 4.1             Results from last 7 days   Lab Units 03/08/24  0440 03/07/24  0705 03/06/24  0451 03/05/24  0621 03/04/24  0511   WBC 10*3/mm3 6.15 6.00 6.19 6.89 7.09   HEMOGLOBIN g/dL 8.6* 8.6* 8.7* 9.5* 8.7*   PLATELETS 10*3/mm3 190 164 181 178 164     Images     CT SCAN OF THE ABDOMEN AND PELVIS WITHOUT AND WITH INTRAVENOUS CONTRAST     HISTORY: 89-year-old female with large right capsular hematoma. Has  recent noncontrast CT scan demonstrating a suspicious exophytic mass  extending from the lower pole of the right kidney. Several bilateral  renal cysts are also present. Previous history of lung cancer.     The CT scan was performed through the abdomen and pelvis without and  with intravenous contrast and compared to the recent noncontrast CT scan  performed 2 days ago and an outside PET fusion CT scan dated 11/01/2021  as well as earlier studies from [facility]. The following  findings are present:  1. Again noted is a large mixed-density capsular hematoma involving the  right kidney, particularly at its posterior and lateral margins. It  measures up to 3.6 cm in thickness and direct comparison with the recent  CT scan shows no significant  change.  2. There is a solid-appearing exophytic mass extending from the lower  pole of the left kidney with a small central area of slight lucency.  This mass measures up to 3.4 x 3.7 x 3.7 cm. It does not show cortical  enhancement similar to the kidney following contrast. This mass can also  be seen on the outside PET fusion CT scan dated 11/01/2021 where it  measured 2.5 x 2.3 x 2.5 cm. This is therefore somewhat suspicious for a  slow-growing renal neoplasm. There our several bilateral renal cysts  that are unchanged. We have also now been able to compare the current  exam with CT angiography of the abdomen and pelvis at [facility] dating back to 07/31/2018. At that time there was a more  benign-appearing cyst extending from the lower pole measuring 1.4 x 1.8  x 1.6 cm. It is therefore conceivable that the current solid-appearing  mass could represent hemorrhage within a cyst as part of the larger  adjacent capsular hematoma. Continued surveillance on follow-up CT scan  may therefore be more prudent at this time.     3. There is some dense pleural calcification in the lower left lung  posteriorly with some adjacent scarring unchanged from 2021. The liver,  spleen, pancreas, and both adrenal glands are unremarkable. 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 This shows no  significant change from 2021. The extensive calcification causes some  luminal narrowing of the aorta below the level of the aneurysm. There  are bilateral common iliac stents present without change.     4. There is no retroperitoneal lymphadenopathy. The large and small  bowel loops are normal in caliber with a moderate amount of stool  scattered in the colon. No abnormality is seen in the pelvis. The  urinary bladder has a smooth contour.     5. At bone windows, no suspicious bone lesions are seen.     Radiation dose reduction techniques  "were utilized, including automated  exposure control and exposure modulation based on body size.              Assessment / Plan     ASSESSMENT:  End-stage renal disease on  hemodialysis  Monday, Wednesday and Friday w/ Dr. García .  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  Repeat phosphorus 3.6    PLAN:  Awaiting hemodialysis today  Awaiting urology recommendations regarding urological findings by repeat CT scan  Surveillance labs    I reviewed the chart and other providers notes, I reviewed labs.  I discussed the case with the patient   Copied text in this note has been reviewed and is accurate as of 03/08/24.       Thank you for involving us in the care of Charlette Rai.  Please feel free to call with any questions.    Kirk Medrano MD  03/08/24  12:35 [facility]    [facility]  [phone]    Please note that portions of this note were completed with a voice recognition program.  "

## 2024-03-09 ENCOUNTER — READMISSION MANAGEMENT (OUTPATIENT)
Dept: CALL CENTER | Facility: HOSPITAL | Age: 89
End: 2024-03-09
Payer: MEDICARE

## 2024-03-09 VITALS
HEART RATE: 76 BPM | SYSTOLIC BLOOD PRESSURE: 143 MMHG | DIASTOLIC BLOOD PRESSURE: 66 MMHG | BODY MASS INDEX: 25.02 KG/M2 | TEMPERATURE: 97.9 F | OXYGEN SATURATION: 90 % | RESPIRATION RATE: 16 BRPM | WEIGHT: 150.35 LBS

## 2024-03-09 LAB
DEPRECATED RDW RBC AUTO: 49.4 FL (ref 37–54)
ERYTHROCYTE [DISTWIDTH] IN BLOOD BY AUTOMATED COUNT: 13.7 % (ref 12.3–15.4)
GLUCOSE BLDC GLUCOMTR-MCNC: 169 MG/DL (ref 70–130)
GLUCOSE BLDC GLUCOMTR-MCNC: 85 MG/DL (ref 70–130)
HCT VFR BLD AUTO: 27.2 % (ref 34–46.6)
HGB BLD-MCNC: 8.6 G/DL (ref 12–15.9)
MCH RBC QN AUTO: 30.7 PG (ref 26.6–33)
MCHC RBC AUTO-ENTMCNC: 31.6 G/DL (ref 31.5–35.7)
MCV RBC AUTO: 97.1 FL (ref 79–97)
PLATELET # BLD AUTO: 186 10*3/MM3 (ref 140–450)
PMV BLD AUTO: 9.1 FL (ref 6–12)
RBC # BLD AUTO: 2.8 10*6/MM3 (ref 3.77–5.28)
WBC NRBC COR # BLD AUTO: 5.75 10*3/MM3 (ref 3.4–10.8)

## 2024-03-09 PROCEDURE — A9270 NON-COVERED ITEM OR SERVICE: HCPCS | Performed by: INTERNAL MEDICINE

## 2024-03-09 PROCEDURE — 94664 DEMO&/EVAL PT USE INHALER: CPT

## 2024-03-09 PROCEDURE — 63710000001 CARVEDILOL 25 MG TABLET: Performed by: INTERNAL MEDICINE

## 2024-03-09 PROCEDURE — 94799 UNLISTED PULMONARY SVC/PX: CPT

## 2024-03-09 PROCEDURE — 63710000001 HYDROCODONE-ACETAMINOPHEN 7.5-325 MG TABLET: Performed by: NURSE PRACTITIONER

## 2024-03-09 PROCEDURE — 63710000001 POLYCARBOPHIL 625 MG TABLET: Performed by: INTERNAL MEDICINE

## 2024-03-09 PROCEDURE — 63710000001 HYDRALAZINE 25 MG TABLET: Performed by: INTERNAL MEDICINE

## 2024-03-09 PROCEDURE — A9270 NON-COVERED ITEM OR SERVICE: HCPCS | Performed by: NURSE PRACTITIONER

## 2024-03-09 PROCEDURE — 94761 N-INVAS EAR/PLS OXIMETRY MLT: CPT

## 2024-03-09 PROCEDURE — 63710000001 INSULIN LISPRO (HUMAN) PER 5 UNITS: Performed by: INTERNAL MEDICINE

## 2024-03-09 PROCEDURE — G0378 HOSPITAL OBSERVATION PER HR: HCPCS

## 2024-03-09 PROCEDURE — 85027 COMPLETE CBC AUTOMATED: CPT | Performed by: UROLOGY

## 2024-03-09 PROCEDURE — 82948 REAGENT STRIP/BLOOD GLUCOSE: CPT

## 2024-03-09 PROCEDURE — 63710000001 PANTOPRAZOLE 40 MG TABLET DELAYED-RELEASE: Performed by: INTERNAL MEDICINE

## 2024-03-09 PROCEDURE — 63710000001 SEVELAMER 800 MG TABLET: Performed by: INTERNAL MEDICINE

## 2024-03-09 RX ORDER — HYDROCODONE BITARTRATE AND ACETAMINOPHEN 7.5; 325 MG/1; MG/1
1 TABLET ORAL EVERY 6 HOURS PRN
Qty: 12 TABLET | Refills: 0 | Status: SHIPPED | OUTPATIENT
Start: 2024-03-09 | End: 2024-03-15 | Stop reason: HOSPADM

## 2024-03-09 RX ORDER — HYDRALAZINE HYDROCHLORIDE 25 MG/1
25 TABLET, FILM COATED ORAL EVERY 12 HOURS SCHEDULED
Qty: 60 TABLET | Refills: 0 | Status: SHIPPED | OUTPATIENT
Start: 2024-03-09

## 2024-03-09 RX ADMIN — PANTOPRAZOLE SODIUM 40 MG: 40 TABLET, DELAYED RELEASE ORAL at 06:30

## 2024-03-09 RX ADMIN — CALCIUM POLYCARBOPHIL 625 MG: 625 TABLET, FILM COATED ORAL at 08:28

## 2024-03-09 RX ADMIN — BUDESONIDE AND FORMOTEROL FUMARATE DIHYDRATE 2 PUFF: 160; 4.5 AEROSOL RESPIRATORY (INHALATION) at 09:59

## 2024-03-09 RX ADMIN — CARVEDILOL 25 MG: 25 TABLET, FILM COATED ORAL at 08:27

## 2024-03-09 RX ADMIN — INSULIN LISPRO 2 UNITS: 100 INJECTION, SOLUTION INTRAVENOUS; SUBCUTANEOUS at 12:39

## 2024-03-09 RX ADMIN — SEVELAMER CARBONATE 800 MG: 800 TABLET, FILM COATED ORAL at 12:39

## 2024-03-09 RX ADMIN — SEVELAMER CARBONATE 800 MG: 800 TABLET, FILM COATED ORAL at 08:28

## 2024-03-09 RX ADMIN — HYDRALAZINE HYDROCHLORIDE 25 MG: 25 TABLET ORAL at 08:28

## 2024-03-09 RX ADMIN — HYDROCODONE BITARTRATE AND ACETAMINOPHEN 1 TABLET: 7.5; 325 TABLET ORAL at 08:27

## 2024-03-09 NOTE — PLAN OF CARE
Problem: Adult Inpatient Plan of Care  Goal: Plan of Care Review  Outcome: Ongoing, Not Progressing  Flowsheets (Taken 3/5/2024 5385)  Progress: improving  Plan of Care Reviewed With: patient  Outcome Evaluation: coping well, still complained of Rt Flank pain, medicated with Norco once, slept well during the night, falls precautin maintained, 2L of O2 at night, VSS, dressing to AD A-V shunt CDI   Goal Outcome Evaluation:  Plan of Care Reviewed With: patient        Progress: improving  Outcome Evaluation: coping well, still complained of Rt Flank pain, medicated with Norco once, slept well during the night, falls precautin maintained, 2L of O2 at night, VSS, dressing to AD A-V shunt CDI                               
  Problem: Adult Inpatient Plan of Care  Goal: Plan of Care Review  Outcome: Ongoing, Not Progressing  Flowsheets (Taken 3/6/2024 0266)  Progress: improving  Plan of Care Reviewed With: patient  Outcome Evaluation: doing well, c/o mild flank pain has not required any pain meds tonight, slept well, scheduled for Hemodialysis today, repeat CT scan of abdomen ordered, VSS   Goal Outcome Evaluation:  Plan of Care Reviewed With: patient        Progress: improving  Outcome Evaluation: doing well, c/o mild flank pain has not required any pain meds tonight, slept well, scheduled for Hemodialysis today, repeat CT scan of abdomen ordered, VSS                               
  Problem: Adult Inpatient Plan of Care  Goal: Plan of Care Review  Outcome: Ongoing, Progressing  Flowsheets (Taken 3/4/2024 1606)  Progress: no change  Plan of Care Reviewed With: patient  Outcome Evaluation: S/L'd piv, accu cks with sliding scale, left upper arm fistula/LMX cream for dialysis today (M-W-F), voids without difficulty, no Left BP's, Norco for pain, bed alarm, up with asst, 2LO2NC at night, will cont to monitor.   Goal Outcome Evaluation:  Plan of Care Reviewed With: patient        Progress: no change  Outcome Evaluation: S/L'd piv, accu cks with sliding scale, left upper arm fistula/LMX cream for dialysis today (M-W-F), voids without difficulty, no Left BP's, Norco for pain, bed alarm, up with asst, 2LO2NC at night, will cont to monitor.                               
Goal Outcome Evaluation:              Outcome Evaluation: VSS. IV abx given. Fall precautions maintained. Up in chair. Plan for Dialysis tomorrow. Norco given for pain. Monitoring blood sugars.                               
Goal Outcome Evaluation:           Progress: no change  Outcome Evaluation: vss,ernesto meals well B.M. x 3 cont to c/o pain in rt lq and back . pain med x3 with relief.                               
Goal Outcome Evaluation:  Arrived to unit at 1545, alert & oriented x 4, Dr Colón just now putting in orders, dietary notified so patient can get dinner tray, LUE AV fistula with positive thrill & bruit, last dialysis was on Friday, bed alarm in use, falls protocol, accumax ordered, daughter at bedside,  Plan of Care Reviewed With: patient, daughter                                           
Goal Outcome Evaluation:  Plan of Care Reviewed With: patient        Progress: improving  Outcome Evaluation: Had repeat CT of abd/pelvis and dialysis today, vss, afebrile, given Norco for bilateral groin and BLE pain, monitoring blood sugars, falls precautions maintained.                               
Goal Outcome Evaluation:  Plan of Care Reviewed With: patient        Progress: improving  Outcome Evaluation: Pt is a 88 y/o F who presented to [facility] as a tsf from [facility] [place] with c/o R sided flank pain and burning with urination. Per chart workup revealed a subcapsular R kidney hematoma. Per Urology and Nephrology they are planning for conservative management. Pt reports she is independent at baseline without use of AD inside of her home and uses a RW outside of her home. Pt states she lives with her  in a house with 3 MAGGIE and BHR. Today, pt was supervision for bed mobility, SBA for STS and SBA progressing to Mod-I for ambulation of 200' with a RW. Pt reports feeling weaker than her baseline due to not ambulating much since admission and denies any SOB or dizziness with mobility while on room air. No knee buckling or LOB during the session. Educated pt on activity rec of sitting [facility]IC TID for all meals, ambulating with nsg to the bathroom and ambulating with nsg into the hallway TID; pt v/u. As pt is at her baseline mobility no further skilled PT services are required and will sign-off. Rec home w/ family assist at [place].      Anticipated Discharge Disposition (PT): home with assist                        
Goal Outcome Evaluation:  Plan of Care Reviewed With: patient        Progress: improving  Outcome Evaluation: VSS, up in chair, family at bedside, CT ordered for tomorrow, falls precautions maintained, PO pain medication given for pain, stool softeners given, no BM yet, monitoring blood sugars                               
Goal Outcome Evaluation:  Plan of Care Reviewed With: patient        Progress: improving  Outcome Evaluation: VSS. PO pain medication with relief. Monitoring blood sugars. IV abx as ordered. Falls precautions maintained.                               
Goal Outcome Evaluation:  Plan of Care Reviewed With: patient        Progress: improving  Outcome Evaluation: VSS. PO pain medication with relief. Monitoring blood sugars. IV abx as ordered. Falls precautions maintained.    Dulcolax suppository given this am                           
Goal Outcome Evaluation:  Plan of Care Reviewed With: patient        Progress: no change  Outcome Evaluation: Resting between care, saline locked, tylenol given for pain control, room air, 3LO2 NC while sleeping, bed alarm and fall precautions maintained                               
Goal Outcome Evaluation:  Plan of Care Reviewed With: patient        Progress: no change  Outcome Evaluation: VSS. PO and IV pain medication with little relief. LAV fistula present with positive thrill and bruit. Declined stool softener- states she had several loose bm on 3/3/2024. Falls precautions in place.                               
calm

## 2024-03-09 NOTE — OUTREACH NOTE
Prep Survey      Flowsheet Row Responses   [facility] facility patient discharged from? [place]   Is LACE score < 7 ? No   Eligibility Readm Mgmt   Discharge diagnosis Hematoma   Does the patient have one of the following disease processes/diagnoses(primary or secondary)? Other   Does the patient have Home health ordered? No   Is there a DME ordered? No   Comments regarding appointments HD at [facility]. f/u with Urology in 6 weeks for repeat CT.   Medication alerts for this patient see AVS   Prep survey completed? Yes            Alicia MONTELONGO - Registered Nurse

## 2024-03-09 NOTE — DISCHARGE SUMMARY
Patient Name: Charlette Rai  : 1935  MRN: [id number]    Date of Admission: 3/3/2024  Date of Discharge:  3/9/2024  Primary Care Physician: Rafael Lyons MD      Chief Complaint:   No chief complaint on file.      Discharge Diagnoses     Active Hospital Problems    Diagnosis  POA    **Hematoma [T14.8XXA]  Yes    ESRD (end stage renal disease) [N18.6]  Unknown    Essential hypertension [I10]  Yes    Type 2 diabetes mellitus [E11.9]  Unknown      Resolved Hospital Problems   No resolved problems to display.        Hospital Course     Ms. Rai is a 89 y.o. female with a history of ESRD on HD, type 2 diabetes, hypertension who presented to [facility] initially complaining of right-sided flank pain.  Please see the admitting history and physical for further details.  She was found to have right renal subcapsular hematoma and UTI and was admitted to the hospital for further evaluation and treatment.  Urology consulted on admission for management recommendations, she was given a trial of conservative management with serial CTs/holding her antiplatelet agents.  Serial CT scans revealed stability in her subcapsular hematoma and she was able to avoid surgery/procedure.  CT scan also revealed a right renal exophytic mass, this may represent neoplasm or hemorrhagic cyst, urology is recommending follow-up in 6 weeks for repeat CT to assess stability.  Patient verbalizes understanding of this plan.  The patient did complete a 5-day course of Rocephin empirically although no organism ever grew on culture.   While admitted nephrology also followed and managed routine dialysis.  Prior to discharge the patient began to complain of acute tenderness of the right groin, a surgery consult was requested to rule out any inguinal hernias, surgery believes this to be a possible inflamed lymph node versus exacerbation of arthritis and recommend Tylenol, heat/ice for pain relief.  Patient reports improvement in her  Chief Complaint   Patient presents with    Ear Problem     Can't hear out of left ear, ringing in ear, plugged, started 1 month ago       SUBJECTIVE:  Shena is a 72 year old female who is seen for “left ear plugged. ”  Denies URI symptoms.  Denies seasonal allergies.  Denies specific care with regard to this prior to presenting here today.  Onset was a month ago and it has been rather intermittent.    Past Medical History:   Diagnosis Date    Esophageal reflux disease     Essential (primary) hypertension     Migraines     Psoriasis     Vitamin D deficiency        Family History   Problem Relation Age of Onset    Other Father          of heart attack    Heart disease Father     Cancer, Ovarian Sister 80    Hypertension Sister     Hypertension Sister     Hypertension Brother     Heart disease Brother     Hypertension Brother     Heart disease Paternal Grandfather     Premenopausal breast cancer Niece 38       Social History     Tobacco Use   Smoking Status Never   Smokeless Tobacco Never       ALLERGIES:  No Known Allergies    Current Outpatient Medications   Medication Sig Dispense Refill    meloxicam (MOBIC) 15 MG tablet Take 1 tablet by mouth daily. 90 tablet 0    hydrochlorothiazide (MICROZIDE) 12.5 MG capsule Take 1 capsule by mouth daily. 90 capsule 4    Biotin 10 MG Cap       atorvastatin (LIPITOR) 10 MG tablet Take 1 tablet by mouth daily. 90 tablet 4    influenza virus quadrivalent vaccine inactivated (Fluzone High-Dose Quadrivalent) 0.7 ML prefilled syringe for injection Inject 0.7 mLs into the muscle 1 time for 1 dose 0.7 mL 0    calcium carbonate-vitamin D (CALTRATE+D) 600-400 MG-UNIT per tablet Take 1 tablet by mouth daily.      betamethasone dipropionate (DIPROSONE) 0.05 % cream Apply 0.1 % topically as needed. Indications: Psoriasis      Multiple Vitamins-Minerals (MULTIVITAMIN PO) Take  by mouth.      aspirin 81 MG tablet Take 81 mg by mouth daily.      Omega-3 Fatty Acids (FISH OIL) 1200 MG  capsule Take 1,200 mg by mouth 2 times daily.      Cholecalciferol (VITAMIN D-3) 5000 UNITS TABS Take 1 tablet by mouth daily.       No current facility-administered medications for this visit.         OBJECTIVE: Visit Vitals  /80   Pulse (!) 54   Temp 97.6 °F (36.4 °C) (Oral)   Resp 14   Wt 98.4 kg (217 lb)   LMP 10/31/2003 (LMP Unknown)   SpO2 99%   BMI 35.02 kg/m²     Tympanic membrane is clear fluid however noted to be present.  Some gentle bulge but no redness present.  External canal is completely patent.  No cerumen.  Otherwise benign external features of the ear.    MDM/PLAN:  Clinically she has serous otitis for which he is asked to take Claritin 10 milligrams daily for the next week.  If not noting improvement add in Flonase.  If persistence of symptoms consideration for following up with ENT.  No concern for complicating infection.  No indication for antibiotic therapy otherwise overall benign exam.      ASSESSMENT:  Left serous otitis     pain prior to discharge.  He should follow-up with her PCP in a week for posthospital follow-up visit and continue to follow with nephrology at routine HD.    Day of Discharge     Subjective:  Resting in bed, eager for discharge home later this afternoon.  Has been cleared by urology and nephrology.    Physical Exam:  Temp:  [97.1 °F (36.2 °C)-97.9 °F (36.6 °C)] 97.9 °F (36.6 °C)  Heart Rate:  [71-83] 76  Resp:  [16-18] 16  BP: (143-180)/(59-75) 143/66  Body mass index is 25.02 kg/m².  Physical Exam  Constitutional:       General: She is not in acute distress.     Appearance: She is ill-appearing (chronically).   Cardiovascular:      Rate and Rhythm: Normal rate and regular rhythm.   Pulmonary:      Effort: Pulmonary effort is normal. No respiratory distress.      Breath sounds: Normal breath sounds. No wheezing.   Abdominal:      General: Abdomen is flat. Bowel sounds are normal.      Palpations: Abdomen is soft.      Tenderness: There is no abdominal tenderness.   Musculoskeletal:         General: No swelling or tenderness.      Right lower leg: No edema.      Left lower leg: No edema.      Comments: LUE AVF   Skin:     General: Skin is warm and dry.   Neurological:      General: No focal deficit present.      Mental Status: She is alert and oriented to person, place, and time. Mental status is at baseline.         Consultants     Consult Orders (all) (From admission, onward)       Start     Ordered    03/08/24 0844  Inpatient General Surgery Consult  Once        Specialty:  General Surgery  Provider:  Ananya Sebastian MD    03/08/24 0844    03/03/24 2043  Inpatient Diabetes Educator Consult  Once,   Status:  Canceled        Provider:  (Not yet assigned)    03/03/24 2043 03/03/24 1639  Inpatient Nephrology Consult  Once        Specialty:  Nephrology  Provider:  Aris Wade MD    03/03/24 1639 03/03/24 1639  Inpatient Urology Consult  Once        Specialty:  Urology  Provider:  León Neville  Abhilash Ibrahim MD    03/03/24 1639                  Procedures     Imaging Results (All)       Procedure Component Value Units Date/Time    CT Abdomen Pelvis With & Without Contrast [[id number]] Collected: 03/08/24 0903     Updated: 03/08/24 1012    Narrative:      CT SCAN OF THE ABDOMEN AND PELVIS WITHOUT AND WITH INTRAVENOUS CONTRAST     HISTORY: 89-year-old female with large right capsular hematoma. Has  recent noncontrast CT scan demonstrating a suspicious exophytic mass  extending from the lower pole of the right kidney. Several bilateral  renal cysts are also present. Previous history of lung cancer.     The CT scan was performed through the abdomen and pelvis without and  with intravenous contrast and compared to the recent noncontrast CT scan  performed 2 days ago and an outside PET fusion CT scan dated 11/01/2021  as well as earlier studies from [facility]. The following  findings are present:  1. Again noted is a large mixed-density capsular hematoma involving the  right kidney, particularly at its posterior and lateral margins. It  measures up to 3.6 cm in thickness and direct comparison with the recent  CT scan shows no significant change.  2. There is a solid-appearing exophytic mass extending from the lower  pole of the left kidney with a small central area of slight lucency.  This mass measures up to 3.4 x 3.7 x 3.7 cm. It does not show cortical  enhancement similar to the kidney following contrast. This mass can also  be seen on the outside PET fusion CT scan dated 11/01/2021 where it  measured 2.5 x 2.3 x 2.5 cm. This is therefore somewhat suspicious for a  slow-growing renal neoplasm. There our several bilateral renal cysts  that are unchanged. We have also now been able to compare the current  exam with CT angiography of the abdomen and pelvis at [facility] dating back to 07/31/2018. At that time there was a more  benign-appearing cyst extending from the lower pole measuring  1.4 x 1.8  x 1.6 cm. It is therefore conceivable that the current solid-appearing  mass could represent hemorrhage within a cyst as part of the larger  adjacent capsular hematoma. Continued surveillance on follow-up CT scan  may therefore be more prudent at this time.     3. There is some dense pleural calcification in the lower left lung  posteriorly with some adjacent scarring unchanged from 2021. The liver,  spleen, pancreas, and both adrenal glands are unremarkable. 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 This shows no  significant change from 2021. The extensive calcification causes some  luminal narrowing of the aorta below the level of the aneurysm. There  are bilateral common iliac stents present without change.     4. There is no retroperitoneal lymphadenopathy. The large and small  bowel loops are normal in caliber with a moderate amount of stool  scattered in the colon. No abnormality is seen in the pelvis. The  urinary bladder has a smooth contour.     5. At bone windows, no suspicious bone lesions are seen.     Radiation dose reduction techniques were utilized, including automated  exposure control and exposure modulation based on body size.        This report was finalized on 3/8/2024 10:09 AM by Dr. Cj Paula M.D  on Workstation: [id number]       CT Abdomen Pelvis Without Contrast [[id number]] Collected: 03/06/24 1408     Updated: 03/06/24 1519    Narrative:      CT ABDOMEN PELVIS WO CONTRAST-     HISTORY: 89 years Female Right sandrita-nephric hematoma     TECHNIQUE:  CT includes axial imaging from the lung bases to the  trochanters without intravenous contrast and without use of oral  contrast. Data reconstructed in coronal and sagittal planes. Radiation  dose reduction techniques were utilized, including automated exposure  control and exposure modulation based on body size.     COMPARISON: Correlation with  CT abdomen and pelvis report 03/03/2024.  PET/CT 04/29/2021.     FINDINGS: There is hyperdense material surrounding the majority of the  right kidney centered posterior and lateral to the right kidney  consistent with subcapsular hematoma. Posterolateral to the right lower  pole this measures approximately 3.3 cm in thickness and extends from  above the upper pole to below the lower pole. At the right renal lower  pole there is more of an exophytic-appearing area of increased density  potentially representing a renal mass and this measures approximately  3.4 x 3.2 cm and further evaluation with CT with and without contrast or  MRI is recommended to evaluate for renal mass. There are several  hyperdense left renal lesions without evidence for acute left renal  abnormality.     The spleen, liver, adrenal glands, pancreas appear with normal limits.  Small gallstones layer dependently within the gallbladder. There is  moderate distention of the the colon with stool particularly involving  the cecum and ascending colon. There is no evidence for bowel  obstruction.     Aortobiiliac stent graft is present. There are extensive atherosclerotic  calcifications. There is a chronic left posterior basilar pleural-based  calcification with adjacent pleural parenchymal scarring.       Impression:      1. Right renal subcapsular hematoma measures up to 3.3 cm in thickness  which is similar to that described on the CT abdomen and pelvis report 3  days ago.  2. 3.4 cm exophytic masslike density right lower pole suspicious for a  solid renal mass and is a potential source for hematoma. Recommend  further evaluation with CT or MRI with and without contrast to evaluate  for a solid lesion. At that time, additional hyperdense bilateral renal  smaller lesions could be evaluated.  3. Cholelithiasis.  4. Moderate stool throughout the colon consistent with constipation.  5. Chronic left lung base pleural parenchymal scarring  "with  calcification.      Radiation dose reduction techniques were utilized, including automated  exposure control and exposure modulation based on body size.        This report was finalized on 3/6/2024 3:16 PM by Dr. Naseem Mancera M.D on Workstation: [id number]               Pertinent Labs     Results from last 7 days   Lab Units 03/09/24 0531 03/08/24  0440 03/07/24 0705 03/06/24  0451   WBC 10*3/mm3 5.75 6.15 6.00 6.19   HEMOGLOBIN g/dL 8.6* 8.6* 8.6* 8.7*   PLATELETS 10*3/mm3 186 190 164 181     Results from last 7 days   Lab Units 03/07/24 0705 03/06/24 0451 03/05/24 0621 03/04/24  0511   SODIUM mmol/L 134* 134* 139 138   POTASSIUM mmol/L 4.0 4.5 4.4 4.5   CHLORIDE mmol/L 102 98 102 101   CO2 mmol/L 25.0 23.3 29.0 25.0   BUN mg/dL 19 39* 26* 49*   CREATININE mg/dL 2.70* 4.24* 3.12* 4.54*   GLUCOSE mg/dL 94 102* 101* 116*   Estimated Creatinine Clearance: 15.8 mL/min (A) (by C-G formula based on SCr of 2.7 mg/dL (H)).  Results from last 7 days   Lab Units 03/07/24 0705 03/06/24 0451 03/05/24 0621 03/04/24  0511 03/03/24  0953   ALBUMIN g/dL 3.1* 3.0* 3.5 3.2* 3.6   BILIRUBIN mg/dL  --   --   --   --  0.2   ALK PHOS U/L  --   --   --   --  199*   AST (SGOT) U/L  --   --   --   --  11   ALT (SGPT) U/L  --   --   --   --  9     Results from last 7 days   Lab Units 03/07/24 0705 03/06/24 0451 03/05/24 0621 03/04/24  0511   CALCIUM mg/dL 8.1* 7.9* 8.4* 8.1*   ALBUMIN g/dL 3.1* 3.0* 3.5 3.2*   PHOSPHORUS mg/dL 3.6 4.3 4.1 4.3               Invalid input(s): \"LDLCALC\"  Results from last 7 days   Lab Units 03/03/24 2040 03/03/24 2027 03/03/24  1131   BLOODCX  No growth at 5 days No growth at 5 days  --    URINECX   --   --  No growth       Test Results Pending at Discharge       Discharge Details        Discharge Medications        New Medications        Instructions Start Date   hydrALAZINE 25 MG tablet  Commonly known as: APRESOLINE   25 mg, Oral, Every 12 Hours Scheduled    "   HYDROcodone-acetaminophen 7.5-325 MG per tablet  Commonly known as: NORCO   1 tablet, Oral, Every 6 Hours PRN             Changes to Medications        Instructions Start Date   levocetirizine 5 MG tablet  Commonly known as: XYZAL  What changed: when to take this   TAKE 1 TABLET DAILY             Continue These Medications        Instructions Start Date   albuterol sulfate  (90 Base) MCG/ACT inhaler  Commonly known as: PROVENTIL HFA;VENTOLIN HFA;PROAIR HFA   2 puffs, Inhalation, Every 4 Hours PRN      carvedilol 25 MG tablet  Commonly known as: Coreg   25 mg, Oral, 2 Times Daily With Meals      dexlansoprazole 60 MG capsule  Commonly known as: DEXILANT   60 mg, Oral, Daily      ergocalciferol 1.25 MG (19693 UT) capsule  Commonly known as: ERGOCALCIFEROL   50,000 Units, Oral, Every 14 Days      linagliptin 5 MG tablet tablet  Commonly known as: Tradjenta   5 mg, Oral, Daily      nitroglycerin 0.4 MG SL tablet  Commonly known as: NITROSTAT   Place 1 tablet under the tongue Every 5 (Five) Minutes As Needed for Chest Pain.      polycarbophil 625 MG tablet tablet   625 mg, Oral, Daily PRN      rosuvastatin 20 MG tablet  Commonly known as: CRESTOR   20 mg, Oral, Daily      sevelamer 800 MG tablet  Commonly known as: RENVELA   800 mg, Oral, 3 Times Daily With Meals      Symbicort 160-4.5 MCG/ACT inhaler  Generic drug: budesonide-formoterol   2 puffs, Inhalation, 2 Times Daily PRN             Stop These Medications      aspirin 81 MG chewable tablet     cilostazol 100 MG tablet  Commonly known as: PLETAL     traMADol 50 MG tablet  Commonly known as: ULTRAM              No Known Allergies      Discharge Disposition:  Home or Self Care    Discharge Diet:  No active diet order      Discharge Activity:   Activity Instructions       Activity as Tolerated              CODE STATUS:    Code Status and Medical Interventions:   Ordered at: 03/03/24 5668     Code Status (Patient has no pulse and is not breathing):    CPR  (Attempt to Resuscitate)     Medical Interventions (Patient has pulse or is breathing):    Full Support       Future Appointments   Date Time Provider Department [place]   3/13/2024 10:00 AM [facility]   3/15/2024  9:00 AM Wan Barksdale MD [facility] VS [place] [facility]   5/16/2024  3:15 PM Jazzy Addison APRN [facility]     Additional Instructions for the Follow-ups that You Need to Schedule       Discharge Follow-up with PCP   As directed       Currently Documented PCP:    Rafael Lyons MD    PCP Phone Number:    [phone]     Follow Up Details: post-hospital follow up        Discharge Follow-up with Specified Provider: Nephrology at routine HD   As directed      To: Nephrology at routine HD        Discharge Follow-up with Specified Provider: Urology in 6 weeks for repeat ct   As directed      To: Urology in 6 weeks for repeat ct               Follow-up Information       Rafael Lyons MD .    Specialty: Internal Medicine  Why: post-hospital follow up  Contact information:  [address]  [place] [employer] [facility] [id number] [phone]               León Neville Jr., MD. Schedule an appointment as soon as possible for a visit.    Specialty: Urology  Contact information:  [address] [facility] [place] [id number] [phone]               Matt Menendez MD. Schedule an appointment as soon as possible for a visit.    Specialty: Nephrology  Contact information:  [address]  [address]  [place] [id number] [phone]                             Additional Instructions for the Follow-ups that You Need to Schedule       Discharge Follow-up with PCP   As directed       Currently Documented PCP:    Rafael Lyons MD    PCP Phone Number:    [phone]     Follow Up Details: post-hospital follow up        Discharge Follow-up with Specified Provider: Nephrology at routine HD   As directed      To: Nephrology at routine HD        Discharge Follow-up with Specified Provider:  Urology in 6 weeks for repeat ct   As directed      To: Urology in 6 weeks for repeat ct            Time Spent on Discharge:  I spent greater than 30 minutes on this discharge activity which included: face-to-face encounter with the patient, reviewing the data in the system, coordination of the care with the nursing staff as well as consultants, documentation, and entering orders.       Emily Chandra MD  [facility]  03/09/24  15:19 EST

## 2024-03-09 NOTE — PROGRESS NOTES
[facility] Progress Note      Patient Name: Charlette Rai  : 1935  MRN: [id number]  Primary Care Physician:  Rafael Lyons MD  Date of admission: 3/3/2024    Subjective     Interval History:   Follow-up end-stage renal disease    Feels nearly back to normal, no further flank pain n/v or dysuria    Review of Systems:   As noted above    Objective     Vitals:   Temp:  [96.9 °F (36.1 °C)-97.9 °F (36.6 °C)] 97.9 °F (36.6 °C)  Heart Rate:  [71-83] 76  Resp:  [16-18] 18  BP: (135-180)/(59-75) 143/66    Intake/Output Summary (Last 24 hours) at 3/9/2024 0922  Last data filed at 3/8/2024 1756  Gross per 24 hour   Intake --   Output 2000 ml   Net -2000 ml       Physical Exam:    General Appearance: Awake, alert, chronically ill, no acute  Skin: warm and dry  HEENT: oral mucosa normal, nonicteric sclera  Neck: supple, no JVD  Lungs: CTA, unlabored breathing effort  Heart: RRR, normal S1 and S2, no rub  Abdomen: soft, nontender, nondistended  : no palpable bladder, she had mild right CVA tenderness  Extremities: no edema, cyanosis or clubbing, functional AV graft in the left upper arm    Scheduled Meds:     budesonide-formoterol, 2 puff, Inhalation, BID - RT  carvedilol, 25 mg, Oral, Q12H  hydrALAZINE, 25 mg, Oral, Q12H  insulin lispro, 2-7 Units, Subcutaneous, 4x Daily AC & at Bedtime  pantoprazole, 40 mg, Oral, BID AC  polycarbophil, 625 mg, Oral, Daily  rosuvastatin, 20 mg, Oral, Nightly  sevelamer, 800 mg, Oral, TID With Meals      IV Meds:        Results Reviewed:   I have personally reviewed the results from the time of this admission to 3/9/2024 09:22 EST     Results from last 7 days   Lab Units 24  0705 24  0451 24  0621 24  0511 24  0953   SODIUM mmol/L 134* 134* 139   < > 138   POTASSIUM mmol/L 4.0 4.5 4.4   < > 4.0   CHLORIDE mmol/L 102 98 102   < > 101   CO2 mmol/L 25.0 23.3 29.0   < > 26.6   BUN mg/dL 19 39* 26*   < > 42*   CREATININE mg/dL  2.70* 4.24* 3.12*   < > 3.72*   CALCIUM mg/dL 8.1* 7.9* 8.4*   < > 8.2*   BILIRUBIN mg/dL  --   --   --   --  0.2   ALK PHOS U/L  --   --   --   --  199*   ALT (SGPT) U/L  --   --   --   --  9   AST (SGOT) U/L  --   --   --   --  11   GLUCOSE mg/dL 94 102* 101*   < > 204*    < > = values in this interval not displayed.       Estimated Creatinine Clearance: 15.8 mL/min (A) (by C-G formula based on SCr of 2.7 mg/dL (H)).    Results from last 7 days   Lab Units 03/07/24  0705 03/06/24  0451 03/05/24  0621   PHOSPHORUS mg/dL 3.6 4.3 4.1             Results from last 7 days   Lab Units 03/09/24  0531 03/08/24  0440 03/07/24  0705 03/06/24  0451 03/05/24  0621   WBC 10*3/mm3 5.75 6.15 6.00 6.19 6.89   HEMOGLOBIN g/dL 8.6* 8.6* 8.6* 8.7* 9.5*   PLATELETS 10*3/mm3 186 190 164 181 178     Images     CT SCAN OF THE ABDOMEN AND PELVIS WITHOUT AND WITH INTRAVENOUS CONTRAST     HISTORY: 89-year-old female with large right capsular hematoma. Has  recent noncontrast CT scan demonstrating a suspicious exophytic mass  extending from the lower pole of the right kidney. Several bilateral  renal cysts are also present. Previous history of lung cancer.     The CT scan was performed through the abdomen and pelvis without and  with intravenous contrast and compared to the recent noncontrast CT scan  performed 2 days ago and an outside PET fusion CT scan dated 11/01/2021  as well as earlier studies from [facility]. The following  findings are present:  1. Again noted is a large mixed-density capsular hematoma involving the  right kidney, particularly at its posterior and lateral margins. It  measures up to 3.6 cm in thickness and direct comparison with the recent  CT scan shows no significant change.  2. There is a solid-appearing exophytic mass extending from the lower  pole of the left kidney with a small central area of slight lucency.  This mass measures up to 3.4 x 3.7 x 3.7 cm. It does not show cortical  enhancement  similar to the kidney following contrast. This mass can also  be seen on the outside PET fusion CT scan dated 11/01/2021 where it  measured 2.5 x 2.3 x 2.5 cm. This is therefore somewhat suspicious for a  slow-growing renal neoplasm. There our several bilateral renal cysts  that are unchanged. We have also now been able to compare the current  exam with CT angiography of the abdomen and pelvis at [facility] dating back to 07/31/2018. At that time there was a more  benign-appearing cyst extending from the lower pole measuring 1.4 x 1.8  x 1.6 cm. It is therefore conceivable that the current solid-appearing  mass could represent hemorrhage within a cyst as part of the larger  adjacent capsular hematoma. Continued surveillance on follow-up CT scan  may therefore be more prudent at this time.     3. There is some dense pleural calcification in the lower left lung  posteriorly with some adjacent scarring unchanged from 2021. The liver,  spleen, pancreas, and both adrenal glands are unremarkable. 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 This shows no  significant change from 2021. The extensive calcification causes some  luminal narrowing of the aorta below the level of the aneurysm. There  are bilateral common iliac stents present without change.     4. There is no retroperitoneal lymphadenopathy. The large and small  bowel loops are normal in caliber with a moderate amount of stool  scattered in the colon. No abnormality is seen in the pelvis. The  urinary bladder has a smooth contour.     5. At bone windows, no suspicious bone lesions are seen.     Radiation dose reduction techniques were utilized, including automated  exposure control and exposure modulation based on body size.              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  Repeat phosphorus 3.6    PLAN:  Awaiting hemodialysis today  Appreciate dr bahena; dc home is ok with me, she will need close urology follow up after discharge for the renal mass vs cyst  Surveillance labs    I reviewed the chart and other providers notes, I reviewed labs.  I discussed the case with the patient   Copied text in this note has been reviewed and is accurate as of 03/09/24.       Thank you for involving us in the care of Charlette Rai.  Please feel free to call with any questions.    Adeel Hobson MD  03/09/24  09:22 [facility]    [facility]  [phone]    "

## 2024-03-09 NOTE — NURSING NOTE
Pt and family present during discharge teaching. Pt instructed to call and make an appointment for follow up scan. Pt received her medications from [facility]. All belongings with pt. All questions answered. Pt ready to go home.

## 2024-03-11 NOTE — PROGRESS NOTES
Case Management Discharge Note      Final Note: Discharged home and continue with HD at [facility] on MWF. . Sanjuana Dsouza RN         Selected Continued Care - Discharged on 3/9/2024 Admission date: 3/3/2024 - Discharge disposition: Home or Self Care         Transportation Services  Private: Car    Final Discharge Disposition Code: 01 - home or self-care

## 2024-03-13 ENCOUNTER — APPOINTMENT (OUTPATIENT)
Dept: CT IMAGING | Facility: HOSPITAL | Age: 89
DRG: 291 | End: 2024-03-13
Payer: MEDICARE

## 2024-03-13 ENCOUNTER — APPOINTMENT (OUTPATIENT)
Dept: GENERAL RADIOLOGY | Facility: HOSPITAL | Age: 89
DRG: 291 | End: 2024-03-13
Payer: MEDICARE

## 2024-03-13 ENCOUNTER — READMISSION MANAGEMENT (OUTPATIENT)
Dept: CALL CENTER | Facility: HOSPITAL | Age: 89
End: 2024-03-13
Payer: MEDICARE

## 2024-03-13 ENCOUNTER — HOSPITAL ENCOUNTER (INPATIENT)
Facility: HOSPITAL | Age: 89
LOS: 2 days | Discharge: HOME | DRG: 291 | End: 2024-03-15
Attending: EMERGENCY MEDICINE | Admitting: INTERNAL MEDICINE
Payer: MEDICARE

## 2024-03-13 ENCOUNTER — APPOINTMENT (OUTPATIENT)
Facility: HOSPITAL | Age: 89
DRG: 291 | End: 2024-03-13
Payer: MEDICARE

## 2024-03-13 DIAGNOSIS — J44.9 CHRONIC OBSTRUCTIVE PULMONARY DISEASE, UNSPECIFIED COPD TYPE: ICD-10-CM

## 2024-03-13 DIAGNOSIS — I50.9 CONGESTIVE HEART FAILURE, UNSPECIFIED HF CHRONICITY, UNSPECIFIED HEART FAILURE TYPE: Primary | ICD-10-CM

## 2024-03-13 DIAGNOSIS — M06.9 RHEUMATOID ARTHRITIS, INVOLVING UNSPECIFIED SITE, UNSPECIFIED WHETHER RHEUMATOID FACTOR PRESENT: ICD-10-CM

## 2024-03-13 LAB
ALBUMIN SERPL-MCNC: 3.6 G/DL (ref 3.5–5.2)
ALBUMIN/GLOB SERPL: 1.2 G/DL
ALP SERPL-CCNC: 206 U/L (ref 39–117)
ALT SERPL W P-5'-P-CCNC: 8 U/L (ref 1–33)
ANION GAP SERPL CALCULATED.3IONS-SCNC: 14.4 MMOL/L (ref 5–15)
AST SERPL-CCNC: 12 U/L (ref 1–32)
BASOPHILS # BLD AUTO: 0.03 10*3/MM3 (ref 0–0.2)
BASOPHILS NFR BLD AUTO: 0.4 % (ref 0–1.5)
BH CV LOWER VASCULAR LEFT COMMON FEMORAL AUGMENT: NORMAL
BH CV LOWER VASCULAR LEFT COMMON FEMORAL COMPETENT: NORMAL
BH CV LOWER VASCULAR LEFT COMMON FEMORAL COMPRESS: NORMAL
BH CV LOWER VASCULAR LEFT COMMON FEMORAL PHASIC: NORMAL
BH CV LOWER VASCULAR LEFT COMMON FEMORAL SPONT: NORMAL
BH CV LOWER VASCULAR RIGHT COMMON FEMORAL AUGMENT: NORMAL
BH CV LOWER VASCULAR RIGHT COMMON FEMORAL COMPETENT: NORMAL
BH CV LOWER VASCULAR RIGHT COMMON FEMORAL COMPRESS: NORMAL
BH CV LOWER VASCULAR RIGHT COMMON FEMORAL PHASIC: NORMAL
BH CV LOWER VASCULAR RIGHT COMMON FEMORAL SPONT: NORMAL
BH CV LOWER VASCULAR RIGHT DISTAL FEMORAL COMPRESS: NORMAL
BH CV LOWER VASCULAR RIGHT GASTRONEMIUS COMPRESS: NORMAL
BH CV LOWER VASCULAR RIGHT GREATER SAPH AK COMPRESS: NORMAL
BH CV LOWER VASCULAR RIGHT GREATER SAPH BK COMPRESS: NORMAL
BH CV LOWER VASCULAR RIGHT LESSER SAPH COMPRESS: NORMAL
BH CV LOWER VASCULAR RIGHT MID FEMORAL AUGMENT: NORMAL
BH CV LOWER VASCULAR RIGHT MID FEMORAL COMPETENT: NORMAL
BH CV LOWER VASCULAR RIGHT MID FEMORAL COMPRESS: NORMAL
BH CV LOWER VASCULAR RIGHT MID FEMORAL PHASIC: NORMAL
BH CV LOWER VASCULAR RIGHT MID FEMORAL SPONT: NORMAL
BH CV LOWER VASCULAR RIGHT PERONEAL COMPRESS: NORMAL
BH CV LOWER VASCULAR RIGHT POPLITEAL AUGMENT: NORMAL
BH CV LOWER VASCULAR RIGHT POPLITEAL COMPETENT: NORMAL
BH CV LOWER VASCULAR RIGHT POPLITEAL COMPRESS: NORMAL
BH CV LOWER VASCULAR RIGHT POPLITEAL PHASIC: NORMAL
BH CV LOWER VASCULAR RIGHT POPLITEAL SPONT: NORMAL
BH CV LOWER VASCULAR RIGHT POSTERIOR TIBIAL COMPRESS: NORMAL
BH CV LOWER VASCULAR RIGHT PROXIMAL FEMORAL COMPRESS: NORMAL
BH CV LOWER VASCULAR RIGHT SAPHENOFEMORAL JUNCTION COMPRESS: NORMAL
BH CV VAS PRELIMINARY FINDINGS SCRIPTING: 1
BILIRUB SERPL-MCNC: 0.2 MG/DL (ref 0–1.2)
BUN SERPL-MCNC: 52 MG/DL (ref 8–23)
BUN/CREAT SERPL: 11.7 (ref 7–25)
CALCIUM SPEC-SCNC: 8.5 MG/DL (ref 8.6–10.5)
CHLORIDE SERPL-SCNC: 101 MMOL/L (ref 98–107)
CO2 SERPL-SCNC: 23.6 MMOL/L (ref 22–29)
CREAT SERPL-MCNC: 4.46 MG/DL (ref 0.57–1)
D DIMER PPP FEU-MCNC: 4.3 MCGFEU/ML (ref 0–0.87)
DEPRECATED RDW RBC AUTO: 55.7 FL (ref 37–54)
EGFRCR SERPLBLD CKD-EPI 2021: 9.1 ML/MIN/1.73
EOSINOPHIL # BLD AUTO: 0.27 10*3/MM3 (ref 0–0.4)
EOSINOPHIL NFR BLD AUTO: 3.2 % (ref 0.3–6.2)
ERYTHROCYTE [DISTWIDTH] IN BLOOD BY AUTOMATED COUNT: 14.8 % (ref 12.3–15.4)
FLUAV SUBTYP SPEC NAA+PROBE: NOT DETECTED
FLUBV RNA ISLT QL NAA+PROBE: NOT DETECTED
GEN 5 2HR TROPONIN T REFLEX: 64 NG/L
GLOBULIN UR ELPH-MCNC: 3 GM/DL
GLUCOSE BLDC GLUCOMTR-MCNC: 97 MG/DL (ref 70–99)
GLUCOSE BLDC GLUCOMTR-MCNC: 97 MG/DL (ref 70–99)
GLUCOSE SERPL-MCNC: 122 MG/DL (ref 65–99)
HCT VFR BLD AUTO: 32.9 % (ref 34–46.6)
HGB BLD-MCNC: 9.7 G/DL (ref 12–15.9)
HOLD SPECIMEN: NORMAL
HOLD SPECIMEN: NORMAL
IMM GRANULOCYTES # BLD AUTO: 0.02 10*3/MM3 (ref 0–0.05)
IMM GRANULOCYTES NFR BLD AUTO: 0.2 % (ref 0–0.5)
LYMPHOCYTES # BLD AUTO: 0.76 10*3/MM3 (ref 0.7–3.1)
LYMPHOCYTES NFR BLD AUTO: 9 % (ref 19.6–45.3)
MCH RBC QN AUTO: 29.9 PG (ref 26.6–33)
MCHC RBC AUTO-ENTMCNC: 29.5 G/DL (ref 31.5–35.7)
MCV RBC AUTO: 101.5 FL (ref 79–97)
MONOCYTES # BLD AUTO: 1.11 10*3/MM3 (ref 0.1–0.9)
MONOCYTES NFR BLD AUTO: 13.2 % (ref 5–12)
NEUTROPHILS NFR BLD AUTO: 6.22 10*3/MM3 (ref 1.7–7)
NEUTROPHILS NFR BLD AUTO: 74 % (ref 42.7–76)
NRBC BLD AUTO-RTO: 0 /100 WBC (ref 0–0.2)
NT-PROBNP SERPL-MCNC: ABNORMAL PG/ML (ref 0–1800)
PLATELET # BLD AUTO: 226 10*3/MM3 (ref 140–450)
PMV BLD AUTO: 9.7 FL (ref 6–12)
POTASSIUM SERPL-SCNC: 4.8 MMOL/L (ref 3.5–5.2)
PROT SERPL-MCNC: 6.6 G/DL (ref 6–8.5)
QT INTERVAL: 410 MS
QTC INTERVAL: 477 MS
RBC # BLD AUTO: 3.24 10*6/MM3 (ref 3.77–5.28)
RSV RNA NPH QL NAA+NON-PROBE: NOT DETECTED
SARS-COV-2 RNA RESP QL NAA+PROBE: NOT DETECTED
SODIUM SERPL-SCNC: 139 MMOL/L (ref 136–145)
TROPONIN T DELTA: -9 NG/L
TROPONIN T SERPL HS-MCNC: 73 NG/L
WBC NRBC COR # BLD AUTO: 8.41 10*3/MM3 (ref 3.4–10.8)
WHOLE BLOOD HOLD COAG: NORMAL
WHOLE BLOOD HOLD SPECIMEN: NORMAL

## 2024-03-13 PROCEDURE — 36415 COLL VENOUS BLD VENIPUNCTURE: CPT

## 2024-03-13 PROCEDURE — 82948 REAGENT STRIP/BLOOD GLUCOSE: CPT

## 2024-03-13 PROCEDURE — 71250 CT THORAX DX C-: CPT

## 2024-03-13 PROCEDURE — 5A1D70Z PERFORMANCE OF URINARY FILTRATION, INTERMITTENT, LESS THAN 6 HOURS PER DAY: ICD-10-PCS | Performed by: INTERNAL MEDICINE

## 2024-03-13 PROCEDURE — 93971 EXTREMITY STUDY: CPT | Performed by: SURGERY

## 2024-03-13 PROCEDURE — 83880 ASSAY OF NATRIURETIC PEPTIDE: CPT | Performed by: EMERGENCY MEDICINE

## 2024-03-13 PROCEDURE — 85025 COMPLETE CBC W/AUTO DIFF WBC: CPT | Performed by: EMERGENCY MEDICINE

## 2024-03-13 PROCEDURE — 87637 SARSCOV2&INF A&B&RSV AMP PRB: CPT | Performed by: EMERGENCY MEDICINE

## 2024-03-13 PROCEDURE — 85379 FIBRIN DEGRADATION QUANT: CPT | Performed by: EMERGENCY MEDICINE

## 2024-03-13 PROCEDURE — 94664 DEMO&/EVAL PT USE INHALER: CPT

## 2024-03-13 PROCEDURE — 80053 COMPREHEN METABOLIC PANEL: CPT | Performed by: EMERGENCY MEDICINE

## 2024-03-13 PROCEDURE — 93010 ELECTROCARDIOGRAM REPORT: CPT | Performed by: INTERNAL MEDICINE

## 2024-03-13 PROCEDURE — 99291 CRITICAL CARE FIRST HOUR: CPT

## 2024-03-13 PROCEDURE — 93971 EXTREMITY STUDY: CPT

## 2024-03-13 PROCEDURE — 93005 ELECTROCARDIOGRAM TRACING: CPT | Performed by: EMERGENCY MEDICINE

## 2024-03-13 PROCEDURE — 94640 AIRWAY INHALATION TREATMENT: CPT

## 2024-03-13 PROCEDURE — 84484 ASSAY OF TROPONIN QUANT: CPT | Performed by: EMERGENCY MEDICINE

## 2024-03-13 PROCEDURE — 94799 UNLISTED PULMONARY SVC/PX: CPT

## 2024-03-13 PROCEDURE — 94761 N-INVAS EAR/PLS OXIMETRY MLT: CPT

## 2024-03-13 PROCEDURE — 82948 REAGENT STRIP/BLOOD GLUCOSE: CPT | Performed by: INTERNAL MEDICINE

## 2024-03-13 PROCEDURE — 71045 X-RAY EXAM CHEST 1 VIEW: CPT

## 2024-03-13 RX ORDER — BUDESONIDE AND FORMOTEROL FUMARATE DIHYDRATE 160; 4.5 UG/1; UG/1
2 AEROSOL RESPIRATORY (INHALATION)
COMMUNITY

## 2024-03-13 RX ORDER — SODIUM CHLORIDE 0.9 % (FLUSH) 0.9 %
10 SYRINGE (ML) INJECTION AS NEEDED
Status: DISCONTINUED | OUTPATIENT
Start: 2024-03-13 | End: 2024-03-15 | Stop reason: HOSPADM

## 2024-03-13 RX ORDER — IPRATROPIUM BROMIDE AND ALBUTEROL SULFATE 2.5; .5 MG/3ML; MG/3ML
3 SOLUTION RESPIRATORY (INHALATION)
Status: DISCONTINUED | OUTPATIENT
Start: 2024-03-13 | End: 2024-03-15 | Stop reason: HOSPADM

## 2024-03-13 RX ORDER — IBUPROFEN 600 MG/1
1 TABLET ORAL
Status: DISCONTINUED | OUTPATIENT
Start: 2024-03-13 | End: 2024-03-15 | Stop reason: HOSPADM

## 2024-03-13 RX ORDER — HYDROCODONE BITARTRATE AND ACETAMINOPHEN 10; 325 MG/1; MG/1
1 TABLET ORAL EVERY 4 HOURS PRN
Status: DISCONTINUED | OUTPATIENT
Start: 2024-03-13 | End: 2024-03-15 | Stop reason: HOSPADM

## 2024-03-13 RX ORDER — NICOTINE POLACRILEX 4 MG
15 LOZENGE BUCCAL
Status: DISCONTINUED | OUTPATIENT
Start: 2024-03-13 | End: 2024-03-15 | Stop reason: HOSPADM

## 2024-03-13 RX ORDER — TORSEMIDE 20 MG/1
100 TABLET ORAL DAILY
Status: DISCONTINUED | OUTPATIENT
Start: 2024-03-13 | End: 2024-03-13

## 2024-03-13 RX ORDER — DEXTROSE MONOHYDRATE 25 G/50ML
25 INJECTION, SOLUTION INTRAVENOUS
Status: DISCONTINUED | OUTPATIENT
Start: 2024-03-13 | End: 2024-03-15 | Stop reason: HOSPADM

## 2024-03-13 RX ORDER — BUDESONIDE, GLYCOPYRROLATE, AND FORMOTEROL FUMARATE 160; 9; 4.8 UG/1; UG/1; UG/1
2 AEROSOL, METERED RESPIRATORY (INHALATION) 2 TIMES DAILY
COMMUNITY
Start: 2023-12-19 | End: 2024-03-13

## 2024-03-13 RX ORDER — HYDROCODONE BITARTRATE AND ACETAMINOPHEN 5; 325 MG/1; MG/1
1 TABLET ORAL EVERY 4 HOURS PRN
Status: DISCONTINUED | OUTPATIENT
Start: 2024-03-13 | End: 2024-03-13

## 2024-03-13 RX ORDER — TORSEMIDE 20 MG/1
100 TABLET ORAL DAILY
Status: DISCONTINUED | OUTPATIENT
Start: 2024-03-14 | End: 2024-03-15 | Stop reason: HOSPADM

## 2024-03-13 RX ORDER — ALBUTEROL SULFATE 2.5 MG/3ML
7.5 SOLUTION RESPIRATORY (INHALATION) CONTINUOUS
Status: DISCONTINUED | OUTPATIENT
Start: 2024-03-13 | End: 2024-03-15 | Stop reason: HOSPADM

## 2024-03-13 RX ORDER — FUROSEMIDE 10 MG/ML
60 INJECTION INTRAMUSCULAR; INTRAVENOUS ONCE
Status: DISCONTINUED | OUTPATIENT
Start: 2024-03-13 | End: 2024-03-15

## 2024-03-13 RX ORDER — INSULIN LISPRO 100 [IU]/ML
2-9 INJECTION, SOLUTION INTRAVENOUS; SUBCUTANEOUS
Status: DISCONTINUED | OUTPATIENT
Start: 2024-03-13 | End: 2024-03-15 | Stop reason: HOSPADM

## 2024-03-13 RX ADMIN — ALBUTEROL SULFATE 7.5 MG: 2.5 SOLUTION RESPIRATORY (INHALATION) at 10:32

## 2024-03-13 RX ADMIN — HYDROCODONE BITARTRATE AND ACETAMINOPHEN 1 TABLET: 5; 325 TABLET ORAL at 15:36

## 2024-03-13 RX ADMIN — HYDROCODONE BITARTRATE AND ACETAMINOPHEN 1 TABLET: 10; 325 TABLET ORAL at 20:40

## 2024-03-13 NOTE — NURSING NOTE
Treatment completed at 1915 . Patient ran for 4 hours. 4 L were removed.   Patient tolerated treatment well with no complications. VS remained stable throughout treatment.   Pt complain of pain and got pain meds from primary nurse. Patient stated meds did not help pain.   Report gave to primary nurse.   Educated patient on importance of completing treatments and not skipping. Renal diet choices and s/s of fluid overload.

## 2024-03-13 NOTE — CONSULTS
[facility]   Nephrology Consult Note      Patient Name: Charlette Rai  : 1935  MRN: [id number]  Primary Care Physician:  Rafael Lyons MD  Referring Physician: Rafael Lyons MD  Date of admission: 3/13/2024    Subjective   Subjective     Reason for Consult/ Chief Complaint: ESRD    HPI:  Charlette Rai is a 89 y.o. female With history of end-stage renal disease, hypertension, heart failure and COPD who presented to the ER this morning with generalized weakness and shortness of breath.  She stated that yesterday she was fine, on Monday had dialysis that had some infiltration but was able to finish her treatment.  She left about 3 kg over her usual dry weight.  She has dropped her blood pressure at some point but not Monday.  Recently hospitalized at [facility] in [place] for perinephric hematoma.  Was seen by urology.  No fevers or chills.  No nausea or vomiting.  I saw her in dialysis, she is feeling little bit better.    Review of Systems   All systems were reviewed and negative except for: What is mentioned above.    Personal History     Past Medical History:   Diagnosis Date    Allergic rhinitis     Anemia     Arthritis     Asthma     USE INHALERS AND NEBULIZERS    Back pain     Bladder disorder     Cancer     LEFT LUNG CANCER  SURGERY AND CHEMO DONE  AND CURRENTLY   RIGHT LUNG CANCER HAS ONLY RECEIVED RADIATION THUS FAR    Chronic kidney disease     stage 3    CKD (chronic kidney disease) stage 4, GFR 15-29 ml/min     Condition not found     ulcer    Congestive heart failure (CHF)     FOLLOWED BY DR AQUINO. DENIES CP BUT DOES HAVE SOA CHRONIC ISSUE COPD/LUNG CANCER    COPD (chronic obstructive pulmonary disease)     FOLLOWED BY DR MARIAJOSE BAILEY    Coronary artery disease     DENIES CP BUT DOES GET SOA MOST OF THE TIME WITH EXERTION BUT OCC AT REST CHRONIC ISSUE COPD/LUNG CANCER    Deep vein thrombosis     Diabetes mellitus     DOES NOT CHECK BS DAILY    Disease of thyroid gland      HYPOTHYROIDISM    Essential hypertension     Gastric ulcer     GERD (gastroesophageal reflux disease)     Heart murmur     History of transfusion     NO ISSUES POST TRANSFUSION WAS MANY YEARS AGO    Hyperlipemia     Leukocytopenia     FOLLOWED BY DR MIR BAILEY    Limb swelling     Lumbago     Lumbar spinal stenosis     Lung cancer     Migraine headache     Multiple joint pain     On home oxygen therapy     3L/NC PRN    Osteopenia     Reflux esophagitis     Shortness of breath     Thyroid nodule     HAS ULTRASOUND YEARLY BEING MONITORED    Vascular disease     Vitamin D deficiency        Past Surgical History:   Procedure Laterality Date    ABDOMINAL SURGERY      APPENDECTOMY      ARTERIOVENOUS FISTULA/SHUNT SURGERY Left 05/10/2022    Procedure: Left basilic vein transposition;  Surgeon: Wan Barksdale MD;  Location: [facility] MAIN OR;  Service: Vascular;  Laterality: Left;    ARTERIOVENOUS FISTULA/SHUNT SURGERY Left 3/23/2023    Procedure: Ligation of left arm arteriovenous fistula;  Surgeon: Wan Barksdale MD;  Location: [facility] MAIN OR;  Service: Vascular;  Laterality: Left;    ARTERIOVENOUS FISTULA/SHUNT SURGERY Left 11/30/2023    Procedure: Creation of left arm arteriovenous graft;  Surgeon: Wan Barksdale MD;  Location: [facility] MAIN OR;  Service: Vascular;  Laterality: Left;    CARDIAC CATHETERIZATION  1996    CARDIAC SURGERY      CARDIAC SURGERY      fluid drained from heart    CATARACT EXTRACTION, BILATERAL  2003    COLONOSCOPY  2014    ENDOSCOPY  2016 2019    FEMORAL ARTERY STENT Bilateral     HYSTERECTOMY      LUNG BIOPSY Left 2005    lobectomy upper lung caner    LUNG VOLUME REDUCTION      OTHER SURGICAL HISTORY      artifical joints/limbs    REPLACEMENT TOTAL KNEE Left 2016    UPPER GASTROINTESTINAL ENDOSCOPY         Family History: family history includes Arthritis in her father and mother; Cancer in her brother, brother, brother, and brother; Diabetes in her brother; Other in her brother; Prostate  cancer in her brother and brother. Otherwise pertinent FHx was reviewed and not pertinent to current issue.    Social History:  reports that she quit smoking about 19 years ago. Her smoking use included cigarettes. She started smoking about 72 years ago. She has a 52.5 pack-year smoking history. She has been exposed to tobacco smoke. She has never used smokeless tobacco. She reports that she does not drink alcohol and does not use drugs.    Home Medications:  HYDROcodone-acetaminophen, albuterol sulfate HFA, budesonide-formoterol, carvedilol, dexlansoprazole, ergocalciferol, hydrALAZINE, levocetirizine, linagliptin, nitroglycerin, polycarbophil, rosuvastatin, and sevelamer    Allergies:  No Known Allergies    Objective    Objective     Vitals:   Temp:  [97 °F (36.1 °C)] 97 °F (36.1 °C)  Heart Rate:  [69-86] 73  Resp:  [16-24] 16  BP: (115-185)/(45-70) 179/61  Flow (L/min):  [2] 2     Physical Exam    Constitutional: Awake, alert, not in distress, mild conversational dyspnea   Eyes: sclerae anicteric, no conjunctival injection   HENT: mucous membranes moist   Neck: Supple, no thyromegaly, no lymphadenopathy, trachea midline, + JVD   Respiratory: Decreased to auscultation bilaterally with bibasilar Rales, mildly tachypneic     Cardiovascular: RRR, no murmurs, rubs, or gallops.   Gastrointestinal: Positive bowel sounds, soft, nontender, nondistended   Musculoskeletal: No edema, no clubbing or cyanosis, left upper extremity AVG is patent, accessed with some bruising.   Psychiatric: Appropriate affect, cooperative   Neurologic: Oriented x 3, moving all extremities, Cranial Nerves grossly intact, speech clear   Skin: warm and dry, no rashes     Result Review    Result Reviewed:  I have personally reviewed the results from the time of this admission to 3/13/2024 15:59 EDT and agree with these findings:  [x]  Laboratory  []  Microbiology  [x]  Radiology  []  EKG/Telemetry   []  Cardiology/Vascular   []  Pathology  [x]  Old  records  []  Other:  LAB RESULTS:    LAB RESULTS:        Lab 03/13/24  0940 03/07/24  0705   SODIUM 139 134*   POTASSIUM 4.8 4.0   CHLORIDE 101 102   CO2 23.6 25.0   BUN 52* 19   CREATININE 4.46* 2.70*   GLUCOSE 122* 94   EGFR 9.1* 16.6*   ANION GAP 14.4 7.0   PHOSPHORUS  --  3.6           Most notable findings include: Labs reviewed.  Chest x-ray and chest CT reviewed.    Assessment & Plan   Assessment / Plan     Brief Patient Summary:  Charlette Rai is a 89 y.o. female who has end-stage renal disease, heart failure and COPD is admitted with shortness of breath.    Active Hospital Problems:  Active Hospital Problems    Diagnosis     **CHF (congestive heart failure)        Assessment and Plan:   - ESRD, dialysis MWF schedule through left upper extremity AVG  at [facility].  Volume status is generous.  Dialysis today, UF 3 to 4 kg as tolerated.  May need additional treatment tomorrow.  I added torsemide 100 mg daily.  Would add 1500 cc p.o. fluid restriction per day once diet is ordered.  - Anemia of chronic kidney disease, hemoglobin is at goal.  - Shortness of breath likely related to increased volume but cannot rule out COPD exacerbation.  Chest x-ray is abnormal though improved from previous.  - Recent [facility] admission with spontaneous right perinephric subcapsular hematoma.  - Left renal lesion will need urology evaluation outpatient.  - Severe hypertension, blood pressure should improve with better volume control.  - Diastolic heart failure, last 2D echo from October showed grade 1 impaired relaxation with hypertrophy moderate aortic stenosis is present.  - Diabetes type 2 with complications.  - Bilateral renal artery stenosis.  - History of lung cancer and COPD.  - Hyperphosphatemia, resume Renvela with meals.  Discussed with primary and dialysis staff.  Will follow.    Thank you very much for this consult!    Electronically signed by Edi García MD, 3/13/2024, 15:59 EDT.

## 2024-03-13 NOTE — ED PROVIDER NOTES
Time: 10:06 AM EDT  Date of encounter:  3/13/2024  Independent Historian/Clinical History and Information was obtained by:   Patient    History is limited by: N/A    Chief Complaint: Shortness of breath      History of Present Illness:  Patient is a 89 y.o. year old female who presents to the emergency department for evaluation of shortness of breath for the past day.  Patient denies fever and chills.  Patient has no nausea, vomiting, or diarrhea.  Patient reports right leg pain with some mild swelling.  Patient denies abdominal pain.  Patient has no dysuria urinary frequency.    HPI    Patient Care Team  Primary Care Provider: Rafael Lyons MD    Past Medical History:     No Known Allergies  Past Medical History:   Diagnosis Date    Allergic rhinitis     Anemia     Arthritis     Asthma     USE INHALERS AND NEBULIZERS    Back pain     Bladder disorder     Cancer     LEFT LUNG CANCER 2005 SURGERY AND CHEMO DONE  AND CURRENTLY 4/ 14/22  RIGHT LUNG CANCER HAS ONLY RECEIVED RADIATION THUS FAR    Chronic kidney disease     stage 3    CKD (chronic kidney disease) stage 4, GFR 15-29 ml/min     Condition not found     ulcer    Congestive heart failure (CHF)     FOLLOWED BY DR AQUINO. DENIES CP BUT DOES HAVE SOA CHRONIC ISSUE COPD/LUNG CANCER    COPD (chronic obstructive pulmonary disease)     FOLLOWED BY DR MARIAJOSE BAILEY    Coronary artery disease     DENIES CP BUT DOES GET SOA MOST OF THE TIME WITH EXERTION BUT OCC AT REST CHRONIC ISSUE COPD/LUNG CANCER    Deep vein thrombosis     Diabetes mellitus     DOES NOT CHECK BS DAILY    Disease of thyroid gland     HYPOTHYROIDISM    Essential hypertension     Gastric ulcer     GERD (gastroesophageal reflux disease)     Heart murmur     History of transfusion     NO ISSUES POST TRANSFUSION WAS MANY YEARS AGO    Hyperlipemia     Leukocytopenia     FOLLOWED BY DR MIR BAILEY    Limb swelling     Lumbago     Lumbar spinal stenosis     Lung cancer     Migraine headache     Multiple joint pain      On home oxygen therapy     3L/NC PRN    Osteopenia     Reflux esophagitis     Shortness of breath     Thyroid nodule     HAS ULTRASOUND YEARLY BEING MONITORED    Vascular disease     Vitamin D deficiency      Past Surgical History:   Procedure Laterality Date    ABDOMINAL SURGERY      APPENDECTOMY      ARTERIOVENOUS FISTULA/SHUNT SURGERY Left 05/10/2022    Procedure: Left basilic vein transposition;  Surgeon: Wan Barksdale MD;  Location: [facility] MAIN OR;  Service: Vascular;  Laterality: Left;    ARTERIOVENOUS FISTULA/SHUNT SURGERY Left 3/23/2023    Procedure: Ligation of left arm arteriovenous fistula;  Surgeon: Wan Barksdale MD;  Location: [facility] MAIN OR;  Service: Vascular;  Laterality: Left;    ARTERIOVENOUS FISTULA/SHUNT SURGERY Left 11/30/2023    Procedure: Creation of left arm arteriovenous graft;  Surgeon: Wan Barksdale MD;  Location: [facility] MAIN OR;  Service: Vascular;  Laterality: Left;    CARDIAC CATHETERIZATION  1996    CARDIAC SURGERY      CARDIAC SURGERY      fluid drained from heart    CATARACT EXTRACTION, BILATERAL  2003    COLONOSCOPY  2014    ENDOSCOPY  2016 2019    FEMORAL ARTERY STENT Bilateral     HYSTERECTOMY      LUNG BIOPSY Left 2005    lobectomy upper lung caner    LUNG VOLUME REDUCTION      OTHER SURGICAL HISTORY      artifical joints/limbs    REPLACEMENT TOTAL KNEE Left 2016    UPPER GASTROINTESTINAL ENDOSCOPY       Family History   Problem Relation Age of Onset    Arthritis Mother     Arthritis Father     Cancer Brother     Diabetes Brother     Other Brother         blood disease     Prostate cancer Brother     Cancer Brother     Prostate cancer Brother     Cancer Brother     Cancer Brother     Malig Hyperthermia Neg Hx     Colon cancer Neg Hx        Home Medications:  Prior to Admission medications    Medication Sig Start Date End Date Taking? Authorizing Provider   albuterol sulfate  (90 Base) MCG/ACT inhaler Inhale 2 puffs Every 4 (Four) Hours As Needed for Wheezing.     Sarah Parker MD   carvedilol (Coreg) 25 MG tablet Take 1 tablet by mouth 2 (Two) Times a Day With Meals for 30 days. 11/5/23 3/3/24  Patrick Turcios MD   dexlansoprazole (DEXILANT) 60 MG capsule Take 1 capsule by mouth Daily. 22   Sarah Parker MD   ergocalciferol (ERGOCALCIFEROL) 1.25 MG (77966 UT) capsule Take 1 capsule by mouth Every 14 (Fourteen) Days. 3/4/22   Emergency, Nurse Epic, RN   hydrALAZINE (APRESOLINE) 25 MG tablet Take 1 tablet by mouth Every 12 (Twelve) Hours. 3/9/24   Emily Chandra MD   HYDROcodone-acetaminophen (NORCO) 7.5-325 MG per tablet Take 1 tablet by mouth Every 6 (Six) Hours As Needed for Moderate Pain for up to 3 days. 3/9/24 3/12/24  Emily Chandra MD   levocetirizine (XYZAL) 5 MG tablet TAKE 1 TABLET DAILY  Patient taking differently: Take 1 tablet by mouth Every Evening. 23   Paloma James MD   linagliptin (Tradjenta) 5 MG tablet tablet Take 1 tablet by mouth Daily. 1/10/22   Paloma James MD   nitroglycerin (NITROSTAT) 0.4 MG SL tablet Place 1 tablet under the tongue Every 5 (Five) Minutes As Needed for Chest Pain.    Sarah Parker MD   polycarbophil (calcium polycarbophil) 625 MG tablet tablet Take 1 tablet by mouth Daily As Needed.    Sarah Parker MD   rosuvastatin (CRESTOR) 20 MG tablet Take 1 tablet by mouth Daily. 23   León Sanchez MD   sevelamer (RENVELA) 800 MG tablet Take 1 tablet by mouth 3 (Three) Times a Day With Meals. 10/9/23   Sarah Parker MD   Symbicort 160-4.5 MCG/ACT inhaler Inhale 2 puffs 2 (Two) Times a Day As Needed. 21   Sarah Parker MD        Social History:   Social History     Tobacco Use    Smoking status: Former     Current packs/day: 0.00     Average packs/day: 1 pack/day for 52.5 years (52.5 ttl pk-yrs)     Types: Cigarettes     Start date:      Quit date: 2004     Years since quittin.7     Passive exposure: Past    Smokeless tobacco:  "Never   Vaping Use    Vaping status: Never Used   Substance Use Topics    Alcohol use: Never    Drug use: Never         Review of Systems:  Review of Systems   Constitutional:  Negative for chills and fever.   HENT:  Negative for congestion, rhinorrhea and sore throat.    Eyes:  Negative for pain and visual disturbance.   Respiratory:  Positive for shortness of breath. Negative for apnea, cough and chest tightness.    Cardiovascular:  Negative for chest pain and palpitations.   Gastrointestinal:  Negative for abdominal pain, diarrhea, nausea and vomiting.   Genitourinary:  Negative for difficulty urinating and dysuria.   Musculoskeletal:  Negative for joint swelling and myalgias.   Skin:  Negative for color change.   Neurological:  Negative for seizures and headaches.   Psychiatric/Behavioral: Negative.     All other systems reviewed and are negative.       Physical Exam:  /52 (BP Location: Right arm, Patient Position: Sitting)   Pulse 74   Temp 97 °F (36.1 °C) (Oral)   Resp 20   Ht 162.6 cm (64\")   Wt 69.9 kg (154 lb 1.6 oz)   LMP  (LMP Unknown)   SpO2 99%   BMI 26.45 kg/m²     Physical Exam  Vitals and nursing note reviewed.   Constitutional:       General: She is not in acute distress.     Appearance: Normal appearance. She is not toxic-appearing.   HENT:      Head: Normocephalic and atraumatic.      Jaw: There is normal jaw occlusion.   Eyes:      General: Lids are normal.      Extraocular Movements: Extraocular movements intact.      Conjunctiva/sclera: Conjunctivae normal.      Pupils: Pupils are equal, round, and reactive to light.   Cardiovascular:      Rate and Rhythm: Normal rate and regular rhythm.      Pulses: Normal pulses.      Heart sounds: Normal heart sounds.   Pulmonary:      Effort: Pulmonary effort is normal. No respiratory distress.      Breath sounds: Normal breath sounds. No wheezing or rhonchi.   Abdominal:      General: Abdomen is flat.      Palpations: Abdomen is soft.      " Tenderness: There is no abdominal tenderness. There is no guarding or rebound.   Musculoskeletal:         General: Normal range of motion.      Cervical back: Normal range of motion and neck supple.      Right lower leg: No edema.      Left lower leg: No edema.   Skin:     General: Skin is warm and dry.   Neurological:      Mental Status: She is alert and oriented to person, place, and time. Mental status is at baseline.   Psychiatric:         Mood and Affect: Mood normal.                  Procedures:  Procedures      Medical Decision Making:      Comorbidities that affect care:    COPD    External Notes reviewed:    Hospital Discharge Summary: Patient was recently discharged for a kidney hematoma      The following orders were placed and all results were independently analyzed by me:  Orders Placed This Encounter   Procedures    COVID PRE-OP / PRE-PROCEDURE SCREENING ORDER (NO ISOLATION) - Swab, Nasopharynx    COVID-19, FLU A/B, RSV PCR 1 HR TAT - Swab, Nasopharynx    XR Chest 1 View    CT Chest Without Contrast Diagnostic    [facility] Draw    Comprehensive Metabolic Panel    BNP    Single High Sensitivity Troponin T    CBC Auto Differential    D-dimer, Quantitative    High Sensitivity Troponin T 2Hr    NPO Diet NPO Type: Strict NPO    Undress & Gown    Continuous Pulse Oximetry    Vital Signs    Code Status and Medical Interventions:    General MD Inpatient Consult    Inpatient Nephrology Consult    Oxygen Therapy- Nasal Cannula; Titrate 1-6 LPM Per SpO2; 90 - 95%    ECG 12 Lead ED Triage Standing Order; SOA    Insert Peripheral IV    Hemodialysis Inpatient    Inpatient Admission    CBC & Differential    Green Top (Gel)    Lavender Top    Gold Top - SST    Light Blue Top       Medications Given in the Emergency Department:  Medications   sodium chloride 0.9 % flush 10 mL (has no administration in time range)   albuterol (PROVENTIL) nebulizer solution 0.083% 2.5 mg/3mL (7.5 mg Nebulization New Bag 3/13/24 1032)    furosemide (LASIX) injection 60 mg (60 mg Intravenous Not Given 3/13/24 1251)        ED Course:         Labs:    Lab Results (last 24 hours)       Procedure Component Value Units Date/Time    CBC & Differential [[id number]]  (Abnormal) Collected: 03/13/24 0940    Specimen: Blood Updated: 03/13/24 0948    Narrative:      The following orders were created for panel order CBC & Differential.  Procedure                               Abnormality         Status                     ---------                               -----------         ------                     CBC Auto Differential[[id number]]        Abnormal            Final result                 Please view results for these tests on the individual orders.    Comprehensive Metabolic Panel [[id number]]  (Abnormal) Collected: 03/13/24 0940    Specimen: Blood Updated: 03/13/24 1017     Glucose 122 mg/dL      BUN 52 mg/dL      Creatinine 4.46 mg/dL      Sodium 139 mmol/L      Potassium 4.8 mmol/L      Chloride 101 mmol/L      CO2 23.6 mmol/L      Calcium 8.5 mg/dL      Total Protein 6.6 g/dL      Albumin 3.6 g/dL      ALT (SGPT) 8 U/L      AST (SGOT) 12 U/L      Alkaline Phosphatase 206 U/L      Total Bilirubin 0.2 mg/dL      Globulin 3.0 gm/dL      A/G Ratio 1.2 g/dL      BUN/Creatinine Ratio 11.7     Anion Gap 14.4 mmol/L      eGFR 9.1 mL/min/1.73      Comment: <15 Indicative of kidney failure       Narrative:      GFR Normal >60  Chronic Kidney Disease <60  Kidney Failure <15    The GFR formula is only valid for adults with stable renal function between ages 18 and 70.    BNP [[id number]]  (Abnormal) Collected: 03/13/24 0940    Specimen: Blood Updated: 03/13/24 1031     proBNP 17,869.0 pg/mL     Narrative:      This assay is used as an aid in the diagnosis of individuals suspected of having heart failure. It can be used as an aid in the diagnosis of acute decompensated heart failure (ADHF) in patients presenting with signs and symptoms of ADHF to the emergency  department (ED). In addition, NT-proBNP of <300 pg/mL indicates ADHF is not likely.    Age Range Result Interpretation  NT-proBNP Concentration (pg/mL:      <50             Positive            >450                   Gray                 300-450                    Negative             <300    50-75           Positive            >900                  Gray                300-900                  Negative            <300      >75             Positive            >1800                  Gray                300-1800                  Negative            <300    Single High Sensitivity Troponin T [[id number]]  (Abnormal) Collected: 03/13/24 0940    Specimen: Blood Updated: 03/13/24 1117     HS Troponin T 73 ng/L     Narrative:      High Sensitive Troponin T Reference Range:  <14.0 ng/L- Negative Female for AMI  <22.0 ng/L- Negative Male for AMI  >=14 - Abnormal Female indicating possible myocardial injury.  >=22 - Abnormal Male indicating possible myocardial injury.   Clinicians would have to utilize clinical acumen, EKG, Troponin, and serial changes to determine if it is an Acute Myocardial Infarction or myocardial injury due to an underlying chronic condition.         CBC Auto Differential [[id number]]  (Abnormal) Collected: 03/13/24 0940    Specimen: Blood Updated: 03/13/24 0948     WBC 8.41 10*3/mm3      RBC 3.24 10*6/mm3      Hemoglobin 9.7 g/dL      Hematocrit 32.9 %      .5 fL      MCH 29.9 pg      MCHC 29.5 g/dL      RDW 14.8 %      RDW-SD 55.7 fl      MPV 9.7 fL      Platelets 226 10*3/mm3      Neutrophil % 74.0 %      Lymphocyte % 9.0 %      Monocyte % 13.2 %      Eosinophil % 3.2 %      Basophil % 0.4 %      Immature Grans % 0.2 %      Neutrophils, Absolute 6.22 10*3/mm3      Lymphocytes, Absolute 0.76 10*3/mm3      Monocytes, Absolute 1.11 10*3/mm3      Eosinophils, Absolute 0.27 10*3/mm3      Basophils, Absolute 0.03 10*3/mm3      Immature Grans, Absolute 0.02 10*3/mm3      nRBC 0.0 /100 WBC     D-dimer,  "Quantitative [[id number]]  (Abnormal) Collected: 03/13/24 0940    Specimen: Blood Updated: 03/13/24 1043     D-Dimer, Quantitative 4.30 MCGFEU/mL     Narrative:      According to the assay 's published package insert, a normal (<0.50 MCGFEU/mL) D-dimer result in conjunction with a non-high clinical probability assessment, excludes deep vein thrombosis (DVT) and pulmonary embolism (PE) with high sensitivity.    D-dimer values increase with age and this can make VTE exclusion of an older population difficult. To address this, the [employer], based on best available evidence and recent guidelines, recommends that clinicians use age-adjusted D-dimer thresholds in patients greater than 50 years of age with: a) a low probability of PE who do not meet all Pulmonary Embolism Rule Out Criteria, or b) in those with intermediate probability of PE.   The formula for an age-adjusted D-dimer cut-off is \"age/100\".  For example, a 60 year old patient would have an age-adjusted cut-off of 0.60 MCGFEU/mL and an 80 year old 0.80 MCGFEU/mL.    COVID PRE-OP / PRE-PROCEDURE SCREENING ORDER (NO ISOLATION) - Swab, Nasopharynx [[id number]]  (Normal) Collected: 03/13/24 1028    Specimen: Swab from Nasopharynx Updated: 03/13/24 1116    Narrative:      The following orders were created for panel order COVID PRE-OP / PRE-PROCEDURE SCREENING ORDER (NO ISOLATION) - Swab, Nasopharynx.  Procedure                               Abnormality         Status                     ---------                               -----------         ------                     COVID-19, FLU A/B, RSV P...[[id number]]  Normal              Final result                 Please view results for these tests on the individual orders.    COVID-19, FLU A/B, RSV PCR 1 HR TAT - Swab, Nasopharynx [[id number]]  (Normal) Collected: 03/13/24 1028    Specimen: Swab from Nasopharynx Updated: 03/13/24 1116     COVID19 Not Detected     Influenza A PCR Not " Detected     Influenza B PCR Not Detected     RSV, PCR Not Detected    Narrative:      Fact sheet for providers: https://www.fda.gov/media/945801/download    Fact sheet for patients: https://www.fda.gov/media/007005/download    Test performed by PCR.    High Sensitivity Troponin T 2Hr [[id number]]  (Abnormal) Collected: 03/13/24 1243    Specimen: Blood Updated: 03/13/24 1324     HS Troponin T 64 ng/L      Troponin T Delta -9 ng/L     Narrative:      High Sensitive Troponin T Reference Range:  <14.0 ng/L- Negative Female for AMI  <22.0 ng/L- Negative Male for AMI  >=14 - Abnormal Female indicating possible myocardial injury.  >=22 - Abnormal Male indicating possible myocardial injury.   Clinicians would have to utilize clinical acumen, EKG, Troponin, and serial changes to determine if it is an Acute Myocardial Infarction or myocardial injury due to an underlying chronic condition.                  Imaging:    CT Chest Without Contrast Diagnostic    Result Date: 3/13/2024  PROCEDURE: CT CHEST WO CONTRAST DIAGNOSTIC  COMPARISON: [facility], CT, CT CHEST WO CONTRAST DIAGNOSTIC, 11/28/2023, 12:16.  [facility], CT, CT ABDOMEN PELVIS WO CONTRAST, 3/03/2024, 10:49.  INDICATIONS: shortness of breath  TECHNIQUE: CT images were created without the administration of contrast material.   PROTOCOL:   Standard imaging protocol performed    RADIATION:   DLP: 254mGy*cm   Automated exposure control was utilized to minimize radiation dose.  FINDINGS:  Noncontrast soft tissues of the lower neck without acute abnormality.  Volume loss in left hemithorax related to left upper lobectomy with deviation of the heart and mediastinum into the left hemithorax.  Dense calcifications of the thoracic aorta and coronary arteries noted.  Mitral annular calcifications.  Negative for pericardial effusion.  Scattered mediastinal lymph nodes are below size criteria.  Chronic calcified pleural plaques on the left which are  stable.  Within the central right upper lobe abutting the right hilum there is an area of chronic consolidation likely posttreatment related scarring measuring up to 5.5 x 4.2 cm which is unchanged.  Small areas of subpleural scarring at the right lower lobe appear stable for example measuring 14 mm (204/39).  Emphysema.  New small right pleural effusion.  No significant effusion on the left.  Mild smooth interlobular the septal thickening at the right lung base suggesting mild interstitial edema.  Chronic scarring/round atelectasis at the left lung base.  Exaggerated thoracic kyphosis.  No aggressive osseous lesion or acute fracture.  Chronic left rib deformities related to prior thoracotomy.  Upper abdomen demonstrates normal noncontrast visualized portions of the liver, spleen, adrenal glands, and pancreas.  Subcapsular hematoma at the right kidney again noted hip similar to recent abdominal CT.  Several hemorrhagic and proteinaceous cyst again noted at the left kidney.  Cholelithiasis.  Normal adrenal glands.  Visualized portions of the pancreas are without acute abnormality.  No abnormality of the spleen.  Large amount of stool in the visualized colon.  Surgical clips at the GE junction with streak artifact.         1. Interlobular septal thickening suggesting pulmonary edema at right lower lobe with new small right pleural effusion. 2. Stable postsurgical changes of left upper lobectomy with pleural plaques in the left hemithorax and chronic left basilar scarring. 3. Stable posttreatment scarring involving the right upper lobe extending to the right hilum. 4. Perinephric/subcapsular hematoma at right kidney similar to recent abdominal CT. 5. Additional chronic findings above.     NAJMA HOGAN MD       Electronically Signed and Approved By: NAJMA HOGAN MD on 3/13/2024 at 12:01             XR Chest 1 View    Result Date: 3/13/2024  PROCEDURE: XR CHEST 1 VW  COMPARISON: [facility], CT, CT CHEST WO  CONTRAST DIAGNOSTIC, 11/28/2023, 12:16.  [facility], CR, XR CHEST 1 VW, 11/27/2023, 10:23.  INDICATIONS: Shortness of air  FINDINGS:  Stable postsurgical changes and volume loss in the left thorax, with leftward mediastinal shift and chronic scarring and pleural calcification.  There is linear consolidation in the right mid lung, suggesting chronic scarring.  This appears slightly decreased as compared to the prior study.  Previously seen right basilar interstitial and airspace opacities have improved.  Question trace right effusion.  No pneumothorax identified.  Heart size is difficult to gauge.  Prominent aortic vascular calcification is noted.         1. Interval improvement in right basilar airspace and interstitial opacities.  Trace right effusion.  2. Stable chronic changes and volume loss in the left thorax.        LINDSEY PACHECO MD       Electronically Signed and Approved By: LINDSEY PACHECO MD on 3/13/2024 at 9:41                Differential Diagnosis and Discussion:    Dyspnea: Differential diagnosis includes but is not limited to metabolic acidosis, neurological disorders, psychogenic, asthma, pneumothorax, upper airway obstruction, COPD, pneumonia, noncardiogenic pulmonary edema, interstitial lung disease, anemia, congestive heart failure, and pulmonary embolism    All labs were reviewed and interpreted by me.  All X-rays impressions were independently interpreted by me.    MDM     Amount and/or Complexity of Data Reviewed  Decide to obtain previous medical records or to obtain history from someone other than the patient: yes    The patient´s CBC that was reviewed and interpreted by me shows no abnormalities of critical concern. Of note, there is no anemia requiring a blood transfusion and the platelet count is acceptable.  CMP does show an elevated creatinine.  CT scan is consistent with volume overload.  The patient was given Lasix IV in the emergency department.    Critical Care Note: Total  Critical Care time of 40 minutes. Total critical care time documented does not include time spent on separately billed procedures for services of nurses or physician assistants. I personally saw and examined the patient. I have reviewed all diagnostic interpretations and treatment plans as written. I was present for the key portions of any procedures performed and the inclusive time noted in any critical care statement. Critical care time includes patient management by me, time spent at the patients bedside,  time to review lab and imaging results, discussing patient care, documentation in the medical record, and time spent with family or caregiver.        Patient Care Considerations:    PERC: I used the PERC score to risk stratify the patient for PE and a CT of the chest was considered but ultimately not indicated in today's visit.      Consultants/Shared Management Plan:    Case was discussed with Dr. García who agrees with consultation.  Case was discussed with Dr. Lyons who agrees to admit the patient.    Social Determinants of Health:    Patient is independent, reliable, and has access to care.       Disposition and Care Coordination:    Admit:   Through independent evaluation of the patient's history, physical, and imperical data, the patient meets criteria for inpatient admission to the hospital.        Final diagnoses:   Congestive heart failure, unspecified HF chronicity, unspecified heart failure type        ED Disposition       ED Disposition   Decision to Admit    Condition   --    Comment   Level of Care: Progressive Care [20]   Diagnosis: CHF (congestive heart failure) [008185]   Admitting Physician: TAWANNA LYONS [[id number]]   Attending Physician: TAWANNA LYONS [[id number]]   Certification: I Certify That Inpatient Hospital Services Are Medically Necessary For Greater Than 2 Midnights                 This medical record created using voice recognition software.             Ken Cuenca MD  03/13/24  [id number]

## 2024-03-13 NOTE — PAYOR COMM NOTE
"Charlette Rai (89 y.o. Female)     PATIENT INFORMATION  Name:  Charlette Rai  MRN#:     [id number]  :  1935         ADMISSION INFORMATION  CLASS: Inpatient   DOS:  3/13/24        CURRENT ATTENDING PROVIDER INFORMATION  Name/NPI: Rafael Lyons MD (NPI: [id number])   Phone: Phone: [phone]        REQUESTING PROVIDER and RENDERING FACILITY  Name:  [facility]   NPI:  [id number]  TID:  [id number]  Address:      [facility] [address] [facility] [facility]  Phone  [phone]      UTILIZATION REVIEW CONTACT INFORMATION  Phone:      [phone]  Fax:           [phone]      ADMISSION DIAGNOSIS  CHF (congestive heart failure) [I50.9]        ++++++++++++++++++++++++++++++++++++++++++++++++++++++++++++++++++++++++++++++++      Date of Birth   1935    Social Security Number       Address   [address] [address]    Home Phone   [phone]    MRN   [id number]       Jew   Druze    Marital Status                               Admission Date   3/13/24    Admission Type   Emergency    Admitting Provider   Rafael Lyons MD    Attending Provider   Ken Cuenca MD    Department, Room/Bed   [facility] EMERGENCY ROOM,        Discharge Date       Discharge Disposition       Discharge Destination                                 Attending Provider: Ken Cuenca MD    Allergies: No Known Allergies    Isolation: None   Infection: None   Code Status: CPR    Ht: 162.6 cm (64\")   Wt: 69.9 kg (154 lb 1.6 oz)    Admission Cmt: None   Principal Problem: CHF (congestive heart failure) [I50.9]                   Active Insurance as of 3/13/2024       Primary Coverage       Payor Plan Insurance Group Employer/Plan Group    HU[employer]NA MEDICARE REPLACEMENT HU[employer] MED ADV GROUP [id number]       Payor Plan Address Payor Plan Phone Number Payor Plan Fax Number Effective Dates    [address] [address] [phone]  2024 - None Entered    [facility] [id number]    "      Subscriber Name Subscriber Birth Date Member ID       JOSEFINA RIOS 1935 [id number]                    Heart Failure [facility]patient [facility]       Indications Met   Last updated by Tanvi Dietrich RN on 3/13/2024 1328     Review Status Created By   Primary Completed Tanvi Dietrich RN      Criteria Review   Heart Failure [facility] Inpatient [facility]     Overall Determination: Indications Met     Criteria:  [×] Admission is indicated for  1 or more  of the following :      [×] Progressively (ongoing) rising creatinine with reduction of more than 25% in estimated glomerular filtration rate from baseline [B]          3/13/2024  1:28 PM              -- 3/13/2024  1:28 PM by Tanvi Dietrich RN --                  3/7:Creatinine 2.7.  GFR 16.6                   3/13:Creatinine 4.46. GFR 9.1     Notes:  -- 3/13/2024  1:28 PM by Tanvi Dietrich RN --      To ED c/o SOA x 24 hrs. No fever. NO N/V.  C/O RLE pain w/mild swelling. NKI.                   PMHx: CKD; HD; Asthma; CHF; COPD: CAD; DVT; DM; HTN: HLD; Hx lung cancer (Hx lobectomy); Femoral artery stents; L TKR.                   In ED: Trop 73; BNP 17,869.0; BUN 52; Creatiine 4.46; GFR 9.1; ; D-dimer 4.30; H/H 9.7/32;       COVID/Flu neg.                   CT chest: No PE noted. Interlobular septal thickening suggesting pulmonary edema at right lower lobe with new small R pleural effusion. Perinephric/subcapsular hematoma at right kidney similar to recent abdominal CT.                  IN ED: O2 2L; Lasix 60mg IV; Albuterol continuous neb.                   Admit: Telemetry; nephrology consult; O2; HD today; Dop scan RLE; Lasix 40mg IV q12h; Zofran IV prn; Dilaudid IV prn; Lovenox SQ; Labs in AM.                               Emergency Department Notes        Kiesha Hardy RN at 03/13/24 1359          Report to BILL tavarez    Electronically signed by Kiesha Hardy RN at 03/13/24 1359       Kiesha Hardy RN at 03/13/24 1349          Tele box called for    Electronically  signed by Kiesha Hardy RN at 03/13/24 1349       Kiesha Hardy RN at 03/13/24 1135          To vascular lab    Electronically signed by Kiesha Hardy RN at 03/13/24 1135       Kiesha Hardy RN at 03/13/24 1010          RT notified of need for neb    Electronically signed by Kiesha Hardy RN at 03/13/24 1011       Kiesha Hardy RN at 03/13/24 1010          Report from BILL Baires    Electronically signed by Kiesha Hardy RN at 03/13/24 1010       Ken Cuenca MD at 03/13/24 1006          Time: 10:06 AM EDT  Date of encounter:  3/13/2024  Independent Historian/Clinical History and Information was obtained by:   Patient    History is limited by: N/A    Chief Complaint: Shortness of breath      History of Present Illness:  Patient is a 89 y.o. year old female who presents to the emergency department for evaluation of shortness of breath for the past day.  Patient denies fever and chills.  Patient has no nausea, vomiting, or diarrhea.  Patient reports right leg pain with some mild swelling.  Patient denies abdominal pain.  Patient has no dysuria urinary frequency.    HPI    Patient Care Team  Primary Care Provider: Rafael Lyons MD    Past Medical History:     No Known Allergies  Past Medical History:   Diagnosis Date    Allergic rhinitis     Anemia     Arthritis     Asthma     USE INHALERS AND NEBULIZERS    Back pain     Bladder disorder     Cancer     LEFT LUNG CANCER 2005 SURGERY AND CHEMO DONE  AND CURRENTLY 4/ 14/22  RIGHT LUNG CANCER HAS ONLY RECEIVED RADIATION THUS FAR    Chronic kidney disease     stage 3    CKD (chronic kidney disease) stage 4, GFR 15-29 ml/min     Condition not found     ulcer    Congestive heart failure (CHF)     FOLLOWED BY DR AQUINO. DENIES CP BUT DOES HAVE SOA CHRONIC ISSUE COPD/LUNG CANCER    COPD (chronic obstructive pulmonary disease)     FOLLOWED BY DR MARIAJOSE BAILEY    Coronary artery disease     DENIES CP BUT DOES GET SOA MOST OF THE TIME WITH EXERTION BUT OCC AT REST CHRONIC  ISSUE COPD/LUNG CANCER    Deep vein thrombosis     Diabetes mellitus     DOES NOT CHECK BS DAILY    Disease of thyroid gland     HYPOTHYROIDISM    Essential hypertension     Gastric ulcer     GERD (gastroesophageal reflux disease)     Heart murmur     History of transfusion     NO ISSUES POST TRANSFUSION WAS MANY YEARS AGO    Hyperlipemia     Leukocytopenia     FOLLOWED BY DR MIR BAILEY    Limb swelling     Lumbago     Lumbar spinal stenosis     Lung cancer     Migraine headache     Multiple joint pain     On home oxygen therapy     3L/NC PRN    Osteopenia     Reflux esophagitis     Shortness of breath     Thyroid nodule     HAS ULTRASOUND YEARLY BEING MONITORED    Vascular disease     Vitamin D deficiency      Past Surgical History:   Procedure Laterality Date    ABDOMINAL SURGERY      APPENDECTOMY      ARTERIOVENOUS FISTULA/SHUNT SURGERY Left 05/10/2022    Procedure: Left basilic vein transposition;  Surgeon: Wan Barksdale MD;  Location: [facility] MAIN OR;  Service: Vascular;  Laterality: Left;    ARTERIOVENOUS FISTULA/SHUNT SURGERY Left 3/23/2023    Procedure: Ligation of left arm arteriovenous fistula;  Surgeon: Wan Barksdale MD;  Location: [facility] MAIN OR;  Service: Vascular;  Laterality: Left;    ARTERIOVENOUS FISTULA/SHUNT SURGERY Left 11/30/2023    Procedure: Creation of left arm arteriovenous graft;  Surgeon: Wan Barksdale MD;  Location: [facility] MAIN OR;  Service: Vascular;  Laterality: Left;    CARDIAC CATHETERIZATION  1996    CARDIAC SURGERY      CARDIAC SURGERY      fluid drained from heart    CATARACT EXTRACTION, BILATERAL  2003    COLONOSCOPY  2014    ENDOSCOPY  2016    2019    FEMORAL ARTERY STENT Bilateral     HYSTERECTOMY      LUNG BIOPSY Left 2005    lobectomy upper lung caner    LUNG VOLUME REDUCTION      OTHER SURGICAL HISTORY      artifical joints/limbs    REPLACEMENT TOTAL KNEE Left 2016    UPPER GASTROINTESTINAL ENDOSCOPY       Family History   Problem Relation Age of Onset    Arthritis Mother      Arthritis Father     Cancer Brother     Diabetes Brother     Other Brother         blood disease     Prostate cancer Brother     Cancer Brother     Prostate cancer Brother     Cancer Brother     Cancer Brother     Malig Hyperthermia Neg Hx     Colon cancer Neg Hx        Home Medications:  Prior to Admission medications    Medication Sig Start Date End Date Taking? Authorizing Provider   albuterol sulfate  (90 Base) MCG/ACT inhaler Inhale 2 puffs Every 4 (Four) Hours As Needed for Wheezing.    Sarah Parker MD   carvedilol (Coreg) 25 MG tablet Take 1 tablet by mouth 2 (Two) Times a Day With Meals for 30 days. 11/5/23 3/3/24  Patrick Turcios MD   dexlansoprazole (DEXILANT) 60 MG capsule Take 1 capsule by mouth Daily. 9/23/22   Sarah Parker MD   ergocalciferol (ERGOCALCIFEROL) 1.25 MG (60584 UT) capsule Take 1 capsule by mouth Every 14 (Fourteen) Days. 3/4/22   Emergency, Nurse Emy, RN   hydrALAZINE (APRESOLINE) 25 MG tablet Take 1 tablet by mouth Every 12 (Twelve) Hours. 3/9/24   Emily Chandra MD   HYDROcodone-acetaminophen (NORCO) 7.5-325 MG per tablet Take 1 tablet by mouth Every 6 (Six) Hours As Needed for Moderate Pain for up to 3 days. 3/9/24 3/12/24  Emily Chandra MD   levocetirizine (XYZAL) 5 MG tablet TAKE 1 TABLET DAILY  Patient taking differently: Take 1 tablet by mouth Every Evening. 6/23/23   Paloma James MD   linagliptin (Tradjenta) 5 MG tablet tablet Take 1 tablet by mouth Daily. 1/10/22   Paloma James MD   nitroglycerin (NITROSTAT) 0.4 MG SL tablet Place 1 tablet under the tongue Every 5 (Five) Minutes As Needed for Chest Pain.    Sarah Parker MD   polycarbophil (calcium polycarbophil) 625 MG tablet tablet Take 1 tablet by mouth Daily As Needed.    Sarah Parker MD   rosuvastatin (CRESTOR) 20 MG tablet Take 1 tablet by mouth Daily. 7/7/23   León Sanchez MD   sevelamer (RENVELA) 800 MG tablet Take 1 tablet by mouth 3  "(Three) Times a Day With Meals. 10/9/23   Sarah Parker MD   Symbicort 160-4.5 MCG/ACT inhaler Inhale 2 puffs 2 (Two) Times a Day As Needed. 21   ProviderSarah MD        Social History:   Social History     Tobacco Use    Smoking status: Former     Current packs/day: 0.00     Average packs/day: 1 pack/day for 52.5 years (52.5 ttl pk-yrs)     Types: Cigarettes     Start date:      Quit date: 2004     Years since quittin.7     Passive exposure: Past    Smokeless tobacco: Never   Vaping Use    Vaping status: Never Used   Substance Use Topics    Alcohol use: Never    Drug use: Never         Review of Systems:  Review of Systems   Constitutional:  Negative for chills and fever.   HENT:  Negative for congestion, rhinorrhea and sore throat.    Eyes:  Negative for pain and visual disturbance.   Respiratory:  Positive for shortness of breath. Negative for apnea, cough and chest tightness.    Cardiovascular:  Negative for chest pain and palpitations.   Gastrointestinal:  Negative for abdominal pain, diarrhea, nausea and vomiting.   Genitourinary:  Negative for difficulty urinating and dysuria.   Musculoskeletal:  Negative for joint swelling and myalgias.   Skin:  Negative for color change.   Neurological:  Negative for seizures and headaches.   Psychiatric/Behavioral: Negative.     All other systems reviewed and are negative.       Physical Exam:  /52 (BP Location: Right arm, Patient Position: Sitting)   Pulse 74   Temp 97 °F (36.1 °C) (Oral)   Resp 20   Ht 162.6 cm (64\")   Wt 69.9 kg (154 lb 1.6 oz)   LMP  (LMP Unknown)   SpO2 99%   BMI 26.45 kg/m²     Physical Exam  Vitals and nursing note reviewed.   Constitutional:       General: She is not in acute distress.     Appearance: Normal appearance. She is not toxic-appearing.   HENT:      Head: Normocephalic and atraumatic.      Jaw: There is normal jaw occlusion.   Eyes:      General: Lids are normal.      Extraocular " Movements: Extraocular movements intact.      Conjunctiva/sclera: Conjunctivae normal.      Pupils: Pupils are equal, round, and reactive to light.   Cardiovascular:      Rate and Rhythm: Normal rate and regular rhythm.      Pulses: Normal pulses.      Heart sounds: Normal heart sounds.   Pulmonary:      Effort: Pulmonary effort is normal. No respiratory distress.      Breath sounds: Normal breath sounds. No wheezing or rhonchi.   Abdominal:      General: Abdomen is flat.      Palpations: Abdomen is soft.      Tenderness: There is no abdominal tenderness. There is no guarding or rebound.   Musculoskeletal:         General: Normal range of motion.      Cervical back: Normal range of motion and neck supple.      Right lower leg: No edema.      Left lower leg: No edema.   Skin:     General: Skin is warm and dry.   Neurological:      Mental Status: She is alert and oriented to person, place, and time. Mental status is at baseline.   Psychiatric:         Mood and Affect: Mood normal.                  Procedures:  Procedures      Medical Decision Making:      Comorbidities that affect care:    COPD    External Notes reviewed:    Hospital Discharge Summary: Patient was recently discharged for a kidney hematoma      The following orders were placed and all results were independently analyzed by me:  Orders Placed This Encounter   Procedures    COVID PRE-OP / PRE-PROCEDURE SCREENING ORDER (NO ISOLATION) - Swab, Nasopharynx    COVID-19, FLU A/B, RSV PCR 1 HR TAT - Swab, Nasopharynx    XR Chest 1 View    CT Chest Without Contrast Diagnostic    [place] Draw    Comprehensive Metabolic Panel    BNP    Single High Sensitivity Troponin T    CBC Auto Differential    D-dimer, Quantitative    High Sensitivity Troponin T 2Hr    NPO Diet NPO Type: Strict NPO    Undress & Gown    Continuous Pulse Oximetry    Vital Signs    Code Status and Medical Interventions:    General MD Inpatient Consult    Inpatient Nephrology Consult    Oxygen  Therapy- Nasal Cannula; Titrate 1-6 LPM Per SpO2; 90 - 95%    ECG 12 Lead ED Triage Standing Order; SOA    Insert Peripheral IV    Hemodialysis Inpatient    Inpatient Admission    CBC & Differential    Green Top (Gel)    Lavender Top    Gold Top - SST    Light Blue Top       Medications Given in the Emergency Department:  Medications   sodium chloride 0.9 % flush 10 mL (has no administration in time range)   albuterol (PROVENTIL) nebulizer solution 0.083% 2.5 mg/3mL (7.5 mg Nebulization New Bag 3/13/24 1032)   furosemide (LASIX) injection 60 mg (60 mg Intravenous Not Given 3/13/24 1251)        ED Course:         Labs:    Lab Results (last 24 hours)       Procedure Component Value Units Date/Time    CBC & Differential [[id number]]  (Abnormal) Collected: 03/13/24 0940    Specimen: Blood Updated: 03/13/24 0948    Narrative:      The following orders were created for panel order CBC & Differential.  Procedure                               Abnormality         Status                     ---------                               -----------         ------                     CBC Auto Differential[[id number]]        Abnormal            Final result                 Please view results for these tests on the individual orders.    Comprehensive Metabolic Panel [[id number]]  (Abnormal) Collected: 03/13/24 0940    Specimen: Blood Updated: 03/13/24 1017     Glucose 122 mg/dL      BUN 52 mg/dL      Creatinine 4.46 mg/dL      Sodium 139 mmol/L      Potassium 4.8 mmol/L      Chloride 101 mmol/L      CO2 23.6 mmol/L      Calcium 8.5 mg/dL      Total Protein 6.6 g/dL      Albumin 3.6 g/dL      ALT (SGPT) 8 U/L      AST (SGOT) 12 U/L      Alkaline Phosphatase 206 U/L      Total Bilirubin 0.2 mg/dL      Globulin 3.0 gm/dL      A/G Ratio 1.2 g/dL      BUN/Creatinine Ratio 11.7     Anion Gap 14.4 mmol/L      eGFR 9.1 mL/min/1.73      Comment: <15 Indicative of kidney failure       Narrative:      GFR Normal >60  Chronic Kidney Disease  <60  Kidney Failure <15    The GFR formula is only valid for adults with stable renal function between ages 18 and 70.    BNP [[id number]]  (Abnormal) Collected: 03/13/24 0940    Specimen: Blood Updated: 03/13/24 1031     proBNP 17,869.0 pg/mL     Narrative:      This assay is used as an aid in the diagnosis of individuals suspected of having heart failure. It can be used as an aid in the diagnosis of acute decompensated heart failure (ADHF) in patients presenting with signs and symptoms of ADHF to the emergency department (ED). In addition, NT-proBNP of <300 pg/mL indicates ADHF is not likely.    Age Range Result Interpretation  NT-proBNP Concentration (pg/mL:      <50             Positive            >450                   Gray                 300-450                    Negative             <300    50-75           Positive            >900                  Gray                300-900                  Negative            <300      >75             Positive            >1800                  Gray                300-1800                  Negative            <300    Single High Sensitivity Troponin T [[id number]]  (Abnormal) Collected: 03/13/24 0940    Specimen: Blood Updated: 03/13/24 1117     HS Troponin T 73 ng/L     Narrative:      High Sensitive Troponin T Reference Range:  <14.0 ng/L- Negative Female for AMI  <22.0 ng/L- Negative Male for AMI  >=14 - Abnormal Female indicating possible myocardial injury.  >=22 - Abnormal Male indicating possible myocardial injury.   Clinicians would have to utilize clinical acumen, EKG, Troponin, and serial changes to determine if it is an Acute Myocardial Infarction or myocardial injury due to an underlying chronic condition.         CBC Auto Differential [[id number]]  (Abnormal) Collected: 03/13/24 0940    Specimen: Blood Updated: 03/13/24 0948     WBC 8.41 10*3/mm3      RBC 3.24 10*6/mm3      Hemoglobin 9.7 g/dL      Hematocrit 32.9 %      .5 fL      MCH 29.9 pg      MCHC  "29.5 g/dL      RDW 14.8 %      RDW-SD 55.7 fl      MPV 9.7 fL      Platelets 226 10*3/mm3      Neutrophil % 74.0 %      Lymphocyte % 9.0 %      Monocyte % 13.2 %      Eosinophil % 3.2 %      Basophil % 0.4 %      Immature Grans % 0.2 %      Neutrophils, Absolute 6.22 10*3/mm3      Lymphocytes, Absolute 0.76 10*3/mm3      Monocytes, Absolute 1.11 10*3/mm3      Eosinophils, Absolute 0.27 10*3/mm3      Basophils, Absolute 0.03 10*3/mm3      Immature Grans, Absolute 0.02 10*3/mm3      nRBC 0.0 /100 WBC     D-dimer, Quantitative [[id number]]  (Abnormal) Collected: 03/13/24 0940    Specimen: Blood Updated: 03/13/24 1043     D-Dimer, Quantitative 4.30 MCGFEU/mL     Narrative:      According to the assay 's published package insert, a normal (<0.50 MCGFEU/mL) D-dimer result in conjunction with a non-high clinical probability assessment, excludes deep vein thrombosis (DVT) and pulmonary embolism (PE) with high sensitivity.    D-dimer values increase with age and this can make VTE exclusion of an older population difficult. To address this, the American College of Physicians, based on best available evidence and recent guidelines, recommends that clinicians use age-adjusted D-dimer thresholds in patients greater than 50 years of age with: a) a low probability of PE who do not meet all Pulmonary Embolism Rule Out Criteria, or b) in those with intermediate probability of PE.   The formula for an age-adjusted D-dimer cut-off is \"age/100\".  For example, a 60 year old patient would have an age-adjusted cut-off of 0.60 MCGFEU/mL and an 80 year old 0.80 MCGFEU/mL.    COVID PRE-OP / PRE-PROCEDURE SCREENING ORDER (NO ISOLATION) - Swab, Nasopharynx [[id number]]  (Normal) Collected: 03/13/24 1028    Specimen: Swab from Nasopharynx Updated: 03/13/24 1116    Narrative:      The following orders were created for panel order COVID PRE-OP / PRE-PROCEDURE SCREENING ORDER (NO ISOLATION) - Swab, Nasopharynx.  Procedure                 "               Abnormality         Status                     ---------                               -----------         ------                     COVID-19, FLU A/B, RSV P...[[id number]]  Normal              Final result                 Please view results for these tests on the individual orders.    COVID-19, FLU A/B, RSV PCR 1 HR TAT - Swab, Nasopharynx [[id number]]  (Normal) Collected: 03/13/24 1028    Specimen: Swab from Nasopharynx Updated: 03/13/24 1116     COVID19 Not Detected     Influenza A PCR Not Detected     Influenza B PCR Not Detected     RSV, PCR Not Detected    Narrative:      Fact sheet for providers: https://www.fda.gov/media/667065/download    Fact sheet for patients: https://www.fda.gov/media/711486/download    Test performed by PCR.    High Sensitivity Troponin T 2Hr [[id number]]  (Abnormal) Collected: 03/13/24 1243    Specimen: Blood Updated: 03/13/24 1324     HS Troponin T 64 ng/L      Troponin T Delta -9 ng/L     Narrative:      High Sensitive Troponin T Reference Range:  <14.0 ng/L- Negative Female for AMI  <22.0 ng/L- Negative Male for AMI  >=14 - Abnormal Female indicating possible myocardial injury.  >=22 - Abnormal Male indicating possible myocardial injury.   Clinicians would have to utilize clinical acumen, EKG, Troponin, and serial changes to determine if it is an Acute Myocardial Infarction or myocardial injury due to an underlying chronic condition.                  Imaging:    CT Chest Without Contrast Diagnostic    Result Date: 3/13/2024  PROCEDURE: CT CHEST WO CONTRAST DIAGNOSTIC  COMPARISON: [facility], CT, CT CHEST WO CONTRAST DIAGNOSTIC, 11/28/2023, 12:16.  [facility], CT, CT ABDOMEN PELVIS WO CONTRAST, 3/03/2024, 10:49.  INDICATIONS: shortness of breath  TECHNIQUE: CT images were created without the administration of contrast material.   PROTOCOL:   Standard imaging protocol performed    RADIATION:   DLP: 254mGy*cm   Automated exposure control was  utilized to minimize radiation dose.  FINDINGS:  Noncontrast soft tissues of the lower neck without acute abnormality.  Volume loss in left hemithorax related to left upper lobectomy with deviation of the heart and mediastinum into the left hemithorax.  Dense calcifications of the thoracic aorta and coronary arteries noted.  Mitral annular calcifications.  Negative for pericardial effusion.  Scattered mediastinal lymph nodes are below size criteria.  Chronic calcified pleural plaques on the left which are stable.  Within the central right upper lobe abutting the right hilum there is an area of chronic consolidation likely posttreatment related scarring measuring up to 5.5 x 4.2 cm which is unchanged.  Small areas of subpleural scarring at the right lower lobe appear stable for example measuring 14 mm (204/39).  Emphysema.  New small right pleural effusion.  No significant effusion on the left.  Mild smooth interlobular the septal thickening at the right lung base suggesting mild interstitial edema.  Chronic scarring/round atelectasis at the left lung base.  Exaggerated thoracic kyphosis.  No aggressive osseous lesion or acute fracture.  Chronic left rib deformities related to prior thoracotomy.  Upper abdomen demonstrates normal noncontrast visualized portions of the liver, spleen, adrenal glands, and pancreas.  Subcapsular hematoma at the right kidney again noted hip similar to recent abdominal CT.  Several hemorrhagic and proteinaceous cyst again noted at the left kidney.  Cholelithiasis.  Normal adrenal glands.  Visualized portions of the pancreas are without acute abnormality.  No abnormality of the spleen.  Large amount of stool in the visualized colon.  Surgical clips at the GE junction with streak artifact.         1. Interlobular septal thickening suggesting pulmonary edema at right lower lobe with new small right pleural effusion. 2. Stable postsurgical changes of left upper lobectomy with pleural plaques  in the left hemithorax and chronic left basilar scarring. 3. Stable posttreatment scarring involving the right upper lobe extending to the right hilum. 4. Perinephric/subcapsular hematoma at right kidney similar to recent abdominal CT. 5. Additional chronic findings above.     NAJMA HOGAN MD       Electronically Signed and Approved By: NAJMA HOGAN MD on 3/13/2024 at 12:01             XR Chest 1 View    Result Date: 3/13/2024  PROCEDURE: XR CHEST 1 VW  COMPARISON: [facility], CT, CT CHEST WO CONTRAST DIAGNOSTIC, 11/28/2023, 12:16.  [facility] [facility], CR, XR CHEST 1 VW, 11/27/2023, 10:23.  INDICATIONS: Shortness of air  FINDINGS:  Stable postsurgical changes and volume loss in the left thorax, with leftward mediastinal shift and chronic scarring and pleural calcification.  There is linear consolidation in the right mid lung, suggesting chronic scarring.  This appears slightly decreased as compared to the prior study.  Previously seen right basilar interstitial and airspace opacities have improved.  Question trace right effusion.  No pneumothorax identified.  Heart size is difficult to gauge.  Prominent aortic vascular calcification is noted.         1. Interval improvement in right basilar airspace and interstitial opacities.  Trace right effusion.  2. Stable chronic changes and volume loss in the left thorax.        LINDSEY PACHECO MD       Electronically Signed and Approved By: LINDSEY PACHECO MD on 3/13/2024 at 9:41                Differential Diagnosis and Discussion:    Dyspnea: Differential diagnosis includes but is not limited to metabolic acidosis, neurological disorders, psychogenic, asthma, pneumothorax, upper airway obstruction, COPD, pneumonia, noncardiogenic pulmonary edema, interstitial lung disease, anemia, congestive heart failure, and pulmonary embolism    All labs were reviewed and interpreted by me.  All X-rays impressions were independently interpreted by me.    MDM     Amount and/or  Complexity of Data Reviewed  Decide to obtain previous medical records or to obtain history from someone other than the patient: yes    The patient´s CBC that was reviewed and interpreted by me shows no abnormalities of critical concern. Of note, there is no anemia requiring a blood transfusion and the platelet count is acceptable.  CMP does show an elevated creatinine.  CT scan is consistent with volume overload.  The patient was given Lasix IV in the emergency department.    Critical Care Note: Total Critical Care time of 40 minutes. Total critical care time documented does not include time spent on separately billed procedures for services of nurses or physician assistants. I personally saw and examined the patient. I have reviewed all diagnostic interpretations and treatment plans as written. I was present for the key portions of any procedures performed and the inclusive time noted in any critical care statement. Critical care time includes patient management by me, time spent at the patients bedside,  time to review lab and imaging results, discussing patient care, documentation in the medical record, and time spent with family or caregiver.        Patient Care Considerations:    PERC: I used the PERC score to risk stratify the patient for PE and a CT of the chest was considered but ultimately not indicated in today's visit.      Consultants/Shared Management Plan:    Case was discussed with Dr. García who agrees with consultation.  Case was discussed with Dr. Lyons who agrees to admit the patient.    Social Determinants of Health:    Patient is independent, reliable, and has access to care.       Disposition and Care Coordination:    Admit:   Through independent evaluation of the patient's history, physical, and imperical data, the patient meets criteria for inpatient admission to the hospital.        Final diagnoses:   Congestive heart failure, unspecified HF chronicity, unspecified heart failure type         ED Disposition       ED Disposition   Decision to Admit    Condition   --    Comment   Level of Care: Progressive Care [20]   Diagnosis: CHF (congestive heart failure) [559037]   Admitting Physician: TAWANNA MORENO [[id number]]   Attending Physician: TAWANNA MORENO [[id number]]   Certification: I Certify That Inpatient Hospital Services Are Medically Necessary For Greater Than 2 Midnights                 This medical record created using voice recognition software.             Ken Cuenca MD  03/13/24 1359      Electronically signed by Ken Cuenca MD at 03/13/24 1359       Facility-Administered Medications as of 3/13/2024   Medication Dose Route Frequency Provider Last Rate Last Admin    albuterol (PROVENTIL) nebulizer solution 0.083% 2.5 mg/3mL  7.5 mg Nebulization Continuous Ken Cuenca MD   7.5 mg at 03/13/24 1032    furosemide (LASIX) injection 60 mg  60 mg Intravenous Once Ken Cuenca MD        sodium chloride 0.9 % flush 10 mL  10 mL Intravenous PRN Ken Cuenca MD         Lab Results (last 24 hours)       Procedure Component Value Units Date/Time    High Sensitivity Troponin T 2Hr [[id number]]  (Abnormal) Collected: 03/13/24 1243    Specimen: Blood Updated: 03/13/24 1324     HS Troponin T 64 ng/L      Troponin T Delta -9 ng/L     Narrative:      High Sensitive Troponin T Reference Range:  <14.0 ng/L- Negative Female for AMI  <22.0 ng/L- Negative Male for AMI  >=14 - Abnormal Female indicating possible myocardial injury.  >=22 - Abnormal Male indicating possible myocardial injury.   Clinicians would have to utilize clinical acumen, EKG, Troponin, and serial changes to determine if it is an Acute Myocardial Infarction or myocardial injury due to an underlying chronic condition.         Single High Sensitivity Troponin T [[id number]]  (Abnormal) Collected: 03/13/24 0940    Specimen: Blood Updated: 03/13/24 1117     HS Troponin T 73 ng/L     Narrative:      High Sensitive Troponin T  Reference Range:  <14.0 ng/L- Negative Female for AMI  <22.0 ng/L- Negative Male for AMI  >=14 - Abnormal Female indicating possible myocardial injury.  >=22 - Abnormal Male indicating possible myocardial injury.   Clinicians would have to utilize clinical acumen, EKG, Troponin, and serial changes to determine if it is an Acute Myocardial Infarction or myocardial injury due to an underlying chronic condition.         COVID PRE-OP / PRE-PROCEDURE SCREENING ORDER (NO ISOLATION) - Swab, Nasopharynx [[id number]]  (Normal) Collected: 03/13/24 1028    Specimen: Swab from Nasopharynx Updated: 03/13/24 1116    Narrative:      The following orders were created for panel order COVID PRE-OP / PRE-PROCEDURE SCREENING ORDER (NO ISOLATION) - Swab, Nasopharynx.  Procedure                               Abnormality         Status                     ---------                               -----------         ------                     COVID-19, FLU A/B, RSV P...[[id number]]  Normal              Final result                 Please view results for these tests on the individual orders.    COVID-19, FLU A/B, RSV PCR 1 HR TAT - Swab, Nasopharynx [[id number]]  (Normal) Collected: 03/13/24 1028    Specimen: Swab from Nasopharynx Updated: 03/13/24 1116     COVID19 Not Detected     Influenza A PCR Not Detected     Influenza B PCR Not Detected     RSV, PCR Not Detected    Narrative:      Fact sheet for providers: https://www.fda.gov/media/799433/download    Fact sheet for patients: https://www.fda.gov/media/582031/download    Test performed by PCR.    D-dimer, Quantitative [[id number]]  (Abnormal) Collected: 03/13/24 0940    Specimen: Blood Updated: 03/13/24 1043     D-Dimer, Quantitative 4.30 MCGFEU/mL     Narrative:      According to the assay 's published package insert, a normal (<0.50 MCGFEU/mL) D-dimer result in conjunction with a non-high clinical probability assessment, excludes deep vein thrombosis (DVT) and pulmonary  "embolism (PE) with high sensitivity.    D-dimer values increase with age and this can make VTE exclusion of an older population difficult. To address this, the [employer], based on best available evidence and recent guidelines, recommends that clinicians use age-adjusted D-dimer thresholds in patients greater than 50 years of age with: a) a low probability of PE who do not meet all Pulmonary Embolism Rule Out Criteria, or b) in those with intermediate probability of PE.   The formula for an age-adjusted D-dimer cut-off is \"age/100\".  For example, a 60 year old patient would have an age-adjusted cut-off of 0.60 MCGFEU/mL and an 80 year old 0.80 MCGFEU/mL.    BNP [[id number]]  (Abnormal) Collected: 03/13/24 0940    Specimen: Blood Updated: 03/13/24 1031     proBNP 17,869.0 pg/mL     Narrative:      This assay is used as an aid in the diagnosis of individuals suspected of having heart failure. It can be used as an aid in the diagnosis of acute decompensated heart failure (ADHF) in patients presenting with signs and symptoms of ADHF to the emergency department (ED). In addition, NT-proBNP of <300 pg/mL indicates ADHF is not likely.    Age Range Result Interpretation  NT-proBNP Concentration (pg/mL:      <50             Positive            >450                   Gray                 300-450                    Negative             <300    50-75           Positive            >900                  Gray                300-900                  Negative            <300      >75             Positive            >1800                  Gray                300-1800                  Negative            <300    Comprehensive Metabolic Panel [[id number]]  (Abnormal) Collected: 03/13/24 0940    Specimen: Blood Updated: 03/13/24 1017     Glucose 122 mg/dL      BUN 52 mg/dL      Creatinine 4.46 mg/dL      Sodium 139 mmol/L      Potassium 4.8 mmol/L      Chloride 101 mmol/L      CO2 23.6 mmol/L      Calcium 8.5 mg/dL     "  Total Protein 6.6 g/dL      Albumin 3.6 g/dL      ALT (SGPT) 8 U/L      AST (SGOT) 12 U/L      Alkaline Phosphatase 206 U/L      Total Bilirubin 0.2 mg/dL      Globulin 3.0 gm/dL      A/G Ratio 1.2 g/dL      BUN/Creatinine Ratio 11.7     Anion Gap 14.4 mmol/L      eGFR 9.1 mL/min/1.73      Comment: <15 Indicative of kidney failure       Narrative:      GFR Normal >60  Chronic Kidney Disease <60  Kidney Failure <15    The GFR formula is only valid for adults with stable renal function between ages 18 and 70.    [place] Draw [[id number]] Collected: 03/13/24 0940    Specimen: Blood Updated: 03/13/24 1010    Narrative:      The following orders were created for panel order [place] Draw.  Procedure                               Abnormality         Status                     ---------                               -----------         ------                     Green Top (Gel)[[id number]]                                  Final result               Lavender Top[[id number]]                                     Final result               Gold Top - SST[[id number]]                                   Final result               Light Blue Top[[id number]]                                   Final result                 Please view results for these tests on the individual orders.    Green Top (Gel) [[id number]] Collected: 03/13/24 0940    Specimen: Blood Updated: 03/13/24 1010     Extra Tube Hold for add-ons.     Comment: Auto resulted.       Lavender Top [[id number]] Collected: 03/13/24 0940    Specimen: Blood Updated: 03/13/24 1010     Extra Tube hold for add-on     Comment: Auto resulted       Gold Top - SST [[id number]] Collected: 03/13/24 0940    Specimen: Blood Updated: 03/13/24 1010     Extra Tube Hold for add-ons.     Comment: Auto resulted.       Light Blue Top [[id number]] Collected: 03/13/24 0940    Specimen: Blood Updated: 03/13/24 1010     Extra Tube Hold for add-ons.     Comment: Auto resulted       CBC & Differential  [[id number]]  (Abnormal) Collected: 03/13/24 0940    Specimen: Blood Updated: 03/13/24 0948    Narrative:      The following orders were created for panel order CBC & Differential.  Procedure                               Abnormality         Status                     ---------                               -----------         ------                     CBC Auto Differential[[id number]]        Abnormal            Final result                 Please view results for these tests on the individual orders.    CBC Auto Differential [[id number]]  (Abnormal) Collected: 03/13/24 0940    Specimen: Blood Updated: 03/13/24 0948     WBC 8.41 10*3/mm3      RBC 3.24 10*6/mm3      Hemoglobin 9.7 g/dL      Hematocrit 32.9 %      .5 fL      MCH 29.9 pg      MCHC 29.5 g/dL      RDW 14.8 %      RDW-SD 55.7 fl      MPV 9.7 fL      Platelets 226 10*3/mm3      Neutrophil % 74.0 %      Lymphocyte % 9.0 %      Monocyte % 13.2 %      Eosinophil % 3.2 %      Basophil % 0.4 %      Immature Grans % 0.2 %      Neutrophils, Absolute 6.22 10*3/mm3      Lymphocytes, Absolute 0.76 10*3/mm3      Monocytes, Absolute 1.11 10*3/mm3      Eosinophils, Absolute 0.27 10*3/mm3      Basophils, Absolute 0.03 10*3/mm3      Immature Grans, Absolute 0.02 10*3/mm3      nRBC 0.0 /100 WBC           Imaging Results (Last 24 Hours)       Procedure Component Value Units Date/Time    CT Chest Without Contrast Diagnostic [[id number]] Collected: 03/13/24 1202     Updated: 03/13/24 1205    Narrative:      PROCEDURE: CT CHEST WO CONTRAST DIAGNOSTIC     COMPARISON: [facility], CT, CT CHEST WO CONTRAST DIAGNOSTIC, 11/28/2023, 12:16.    [facility], CT, CT ABDOMEN PELVIS WO CONTRAST, 3/03/2024, 10:49.     INDICATIONS: shortness of breath     TECHNIQUE: CT images were created without the administration of contrast material.       PROTOCOL:   Standard imaging protocol performed      RADIATION:   DLP: 254mGy*cm    Automated exposure control was  utilized to minimize radiation dose.      FINDINGS:   Noncontrast soft tissues of the lower neck without acute abnormality.  Volume loss in left   hemithorax related to left upper lobectomy with deviation of the heart and mediastinum into the   left hemithorax.  Dense calcifications of the thoracic aorta and coronary arteries noted.  Mitral   annular calcifications.  Negative for pericardial effusion.  Scattered mediastinal lymph nodes are   below size criteria.  Chronic calcified pleural plaques on the left which are stable.     Within the central right upper lobe abutting the right hilum there is an area of chronic   consolidation likely posttreatment related scarring measuring up to 5.5 x 4.2 cm which is   unchanged.  Small areas of subpleural scarring at the right lower lobe appear stable for example   measuring 14 mm (204/39).  Emphysema.  New small right pleural effusion.  No significant effusion   on the left.  Mild smooth interlobular the septal thickening at the right lung base suggesting mild   interstitial edema.  Chronic scarring/round atelectasis at the left lung base.  Exaggerated   thoracic kyphosis.  No aggressive osseous lesion or acute fracture.  Chronic left rib deformities   related to prior thoracotomy.     Upper abdomen demonstrates normal noncontrast visualized portions of the liver, spleen, adrenal   glands, and pancreas.  Subcapsular hematoma at the right kidney again noted hip similar to recent   abdominal CT.  Several hemorrhagic and proteinaceous cyst again noted at the left kidney.    Cholelithiasis.  Normal adrenal glands.  Visualized portions of the pancreas are without acute   abnormality.  No abnormality of the spleen.  Large amount of stool in the visualized colon.    Surgical clips at the GE junction with streak artifact.          Impression:         1. Interlobular septal thickening suggesting pulmonary edema at right lower lobe with new small   right pleural effusion.  2. 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  3. Stable posttreatment scarring involving the right upper lobe extending to the right hilum.  4. Perinephric/subcapsular hematoma at right kidney similar to recent abdominal CT.  5. Additional chronic findings above.            NAJMA HOGAN MD         Electronically Signed and Approved By: NAJMA HOGAN MD on 3/13/2024 at 12:01                     XR Chest 1 View [[id number]] Collected: 03/13/24 0941     Updated: 03/13/24 0944    Narrative:      PROCEDURE: XR CHEST 1 VW     COMPARISON: [facility], CT, CT CHEST WO CONTRAST DIAGNOSTIC, 11/28/2023, 12:16.    [facility], CR, XR CHEST 1 VW, 11/27/2023, 10:23.     INDICATIONS: Shortness of air     FINDINGS:   Stable postsurgical changes and volume loss in the left thorax, with leftward mediastinal shift and   chronic scarring and pleural calcification.  There is linear consolidation in the right mid lung,   suggesting chronic scarring.  This appears slightly decreased as compared to the prior study.    Previously seen right basilar interstitial and airspace opacities have improved.  Question trace   right effusion.  No pneumothorax identified.  Heart size is difficult to gauge.  Prominent aortic   vascular calcification is noted.       Impression:            1. Interval improvement in right basilar airspace and interstitial opacities.  Trace right   effusion.     2. Stable chronic changes and volume loss in the left thorax.                    LINDSEY PACHECO MD         Electronically Signed and Approved By: LINDSEY PACHECO MD on 3/13/2024 at 9:41                           Orders (last 24 hrs)        Start     Ordered    03/13/24 1307  Hemodialysis Inpatient  Once        Comments: May use albumin to support bp if needed.   No Heparin.   Thank you!    03/13/24 1307    03/13/24 1259  Inpatient Admission  Once         03/13/24 1300     03/13/24 1259  Code Status and Medical Interventions:  Continuous         03/13/24 1300    03/13/24 1245  furosemide (LASIX) injection 60 mg  Once         03/13/24 1224    03/13/24 1236  Inpatient Nephrology Consult  Once        Specialty:  Nephrology  Provider:  Edi García MD    03/13/24 1235    03/13/24 1225  General MD Inpatient Consult  Once        Provider:  Rafael Lyons MD    03/13/24 1230    03/13/24 1140  High Sensitivity Troponin T 2Hr  PROCEDURE ONCE         03/13/24 1117    03/13/24 1030  albuterol (PROVENTIL) nebulizer solution 0.083% 2.5 mg/3mL  Continuous         03/13/24 1005    03/13/24 1009  COVID PRE-OP / PRE-PROCEDURE SCREENING ORDER (NO ISOLATION) - Swab, Nasopharynx  Once         03/13/24 1008    03/13/24 1009  COVID-19, FLU A/B, RSV PCR 1 HR TAT - Swab, Nasopharynx  PROCEDURE ONCE         03/13/24 1008    03/13/24 1006  Duplex Venous Lower Extremity - Right CAR  Once         03/13/24 1005    03/13/24 1006  CT Chest Without Contrast Diagnostic  1 Time Imaging         03/13/24 1006    03/13/24 1005  D-dimer, Quantitative  STAT         03/13/24 1005    03/13/24 0907  NPO Diet NPO Type: Strict NPO  Diet Effective Now         03/13/24 0906    03/13/24 0907  Undress & Gown  Once         03/13/24 0906    03/13/24 0907  Cardiac Monitoring  Continuous        Comments: Follow Standing Orders As Outlined in Process Instructions (Open Order Report to View Full Instructions)    03/13/24 0906    03/13/24 0907  Continuous Pulse Oximetry  Continuous         03/13/24 0906    03/13/24 0907  Vital Signs  Per Hospital Policy         03/13/24 0906    03/13/24 0907  ECG 12 Lead ED Triage Standing Order; SOA  Once         03/13/24 0906    03/13/24 0907  XR Chest 1 View  1 Time Imaging         03/13/24 0906    03/13/24 0907  Insert Peripheral IV  Once         03/13/24 0906    03/13/24 0907  [place] Draw  Once         03/13/24 0906    03/13/24 0907  CBC & Differential  Once         03/13/24 0906     03/13/24 0907  Comprehensive Metabolic Panel  Once         03/13/24 0906    03/13/24 0907  BNP  Once         03/13/24 0906    03/13/24 0907  Single High Sensitivity Troponin T  Once         03/13/24 0906    03/13/24 0907  Green Top (Gel)  PROCEDURE ONCE         03/13/24 0906    03/13/24 0907  Lavender Top  PROCEDURE ONCE         03/13/24 0906    03/13/24 0907  Gold Top - SST  PROCEDURE ONCE         03/13/24 0906    03/13/24 0907  Light Blue Top  PROCEDURE ONCE         03/13/24 0906    03/13/24 0907  CBC Auto Differential  PROCEDURE ONCE         03/13/24 0906    03/13/24 0906  sodium chloride 0.9 % flush 10 mL  As Needed         03/13/24 0906 12/19/23 0000  Breztri Aerosphere 160-9-4.8 MCG/ACT aerosol inhaler  2 Times Daily,   Status:  Discontinued         03/13/24 1256    Unscheduled  Oxygen Therapy- Nasal Cannula; Titrate 1-6 LPM Per SpO2; 90 - 95%  Continuous PRN       03/13/24 0906    Signed and Held  carvedilol (COREG) tablet 25 mg  2 Times Daily With Meals         Signed and Held    Signed and Held  hydrALAZINE (APRESOLINE) tablet 25 mg  Every 12 Hours Scheduled         Signed and Held    Signed and Held  linagliptin (TRADJENTA) tablet 5 mg  Daily         Signed and Held    Signed and Held  sevelamer (RENVELA) tablet 800 mg  3 Times Daily With Meals         Signed and Held    Signed and Held  Vital Signs  Every 4 Hours       Signed and Held    Signed and Held  Notify Provider (With Default Parameters)  Until Discontinued         Signed and Held    Signed and Held  Up With Assistance  As Needed         Signed and Held    Signed and Held  Diet: Cardiac; Healthy Heart (2-3 Na+); Fluid Consistency: Thin (IDDSI 0)  Diet Effective Now         Signed and Held    Signed and Held  Intake & Output  Every Shift       Signed and Held    Signed and Held  Weigh Patient  Once         Signed and Held    Signed and Held  Oral Care  2 Times Daily       Signed and Held    Signed and Held  Inpatient Case Management Social  "Services Consult  Once        Provider:  (Not yet assigned)    Signed and Held    Signed and Held  CBC Auto Differential  Morning Draw         Signed and Held    Signed and Held  Comprehensive Metabolic Panel  Morning Draw         Signed and Held    Signed and Held  Insert Peripheral IV  Once         Signed and Held    Signed and Held  Saline Lock & Maintain IV Access  Continuous         Signed and Held    Signed and Held  sodium chloride 0.9 % flush 10 mL  Every 12 Hours Scheduled         Signed and Held    Signed and Held  sodium chloride 0.9 % flush 10 mL  As Needed         Signed and Held    Signed and Held  sodium chloride 0.9 % infusion 40 mL  As Needed         Signed and Held    Signed and Held  acetaminophen (TYLENOL) tablet 650 mg  Every 4 Hours PRN         Signed and Held    Signed and Held  HYDROcodone-acetaminophen (NORCO) 5-325 MG per tablet 1 tablet  Every 4 Hours PRN         Signed and Held    Signed and Held  HYDROmorphone (DILAUDID) injection 0.5 mg  Every 2 Hours PRN        Placed in \"And\" Linked Group    Signed and Held    Signed and Held  naloxone (NARCAN) injection 0.4 mg  Every 5 Minutes PRN        Placed in \"And\" Linked Group    Signed and Held    Signed and Held  aluminum-magnesium hydroxide-simethicone (MAALOX MAX) 400-400-40 MG/5ML suspension 15 mL  Every 6 Hours PRN         Signed and Held    Signed and Held  famotidine (PEPCID) tablet 40 mg  Daily         Signed and Held    Signed and Held  ondansetron (ZOFRAN) injection 4 mg  Every 4 Hours PRN         Signed and Held    Signed and Held  temazepam (RESTORIL) capsule 15 mg  Nightly PRN         Signed and Held    Signed and Held  Pharmacy to Dose enoxaparin (LOVENOX)  Continuous PRN         Signed and Held    Signed and Held  Continuous Cardiac Monitoring  Continuous        Comments: Follow Standing Orders As Outlined in Process Instructions (Open Order Report to View Full Instructions)    Signed and Held    Signed and Held  nitroglycerin " "(NITROSTAT) SL tablet 0.4 mg  Every 5 Minutes PRN         Signed and Held    Signed and Held  Telemetry - Maintain IV Access  Continuous         Signed and Held    Signed and Held  Telemetry - Place Orders & Notify Provider of Results When Patient Experiences Acute Chest Pain, Dysrhythmia or Respiratory Distress  Continuous        Comments: Open Order Report to View Parameters Requiring Provider Notification    Signed and Held    Signed and Held  sennosides-docusate (PERICOLACE) 8.6-50 MG per tablet 2 tablet  2 Times Daily PRN        Placed in \"And\" Linked Group    Signed and Held    Signed and Held  polyethylene glycol (MIRALAX) packet 17 g  Daily PRN        Placed in \"And\" Linked Group    Signed and Held    Signed and Held  bisacodyl (DULCOLAX) EC tablet 5 mg  Daily PRN        Placed in \"And\" Linked Group    Signed and Held    Signed and Held  bisacodyl (DULCOLAX) suppository 10 mg  Daily PRN        Placed in \"And\" Linked Group    Signed and Held    Signed and Held  furosemide (LASIX) injection 40 mg  Every 12 Hours         Signed and Held    --  budesonide-formoterol (SYMBICORT) 160-4.5 MCG/ACT inhaler  2 Times Daily - RT         03/13/24 1304                  "

## 2024-03-13 NOTE — OUTREACH NOTE
Medical Week 1 Survey      Flowsheet Row Responses   [facility] patient discharged from? [place]   Does the patient have one of the following disease processes/diagnoses(primary or secondary)? Other   Week 1 attempt successful? No   Unsuccessful attempts Attempt 1  [Patient currently in the ED]            Sonali WINN - Registered Nurse

## 2024-03-13 NOTE — H&P
[facility]   HISTORY AND PHYSICAL    Patient Name: Charlette Rai  : 1935  MRN: [id number]  Primary Care Physician:  Rafael Lyons MD  Date of admission: 3/13/2024    Subjective   Subjective     Chief Complaint:   Shortness of breath      HPI:    Charlette Rai is a 89 y.o. female with known history of COPD, history of lung cancer, CHF and chronic kidney disease on hemodialysis brought into ER for increasing shortness of breath.  Patient has been on dialysis for last few months and pretty regular and gets dialysis on time.  Patient's workup in the ER revealed volume overload and CHF findings.  Patient will be RNP extremely high.  Patient also complains of some right groin pain and leg .  .  Venous Doppler negative.        Review of Systems:    Weakness.  Fatigue.  Shortness of breath.  Lower extremity/leg pain  No chest pain.  Chest congestion    Personal History     Past Medical History:   Diagnosis Date    Allergic rhinitis     Anemia     Arthritis     Asthma     USE INHALERS AND NEBULIZERS    Back pain     Bladder disorder     Cancer     LEFT LUNG CANCER  SURGERY AND CHEMO DONE  AND CURRENTLY   RIGHT LUNG CANCER HAS ONLY RECEIVED RADIATION THUS FAR    Chronic kidney disease     stage 3    CKD (chronic kidney disease) stage 4, GFR 15-29 ml/min     Condition not found     ulcer    Congestive heart failure (CHF)     FOLLOWED BY DR AQUINO. DENIES CP BUT DOES HAVE SOA CHRONIC ISSUE COPD/LUNG CANCER    COPD (chronic obstructive pulmonary disease)     FOLLOWED BY DR MARIAJOSE BAILEY    Coronary artery disease     DENIES CP BUT DOES GET SOA MOST OF THE TIME WITH EXERTION BUT OCC AT REST CHRONIC ISSUE COPD/LUNG CANCER    Deep vein thrombosis     Diabetes mellitus     DOES NOT CHECK BS DAILY    Disease of thyroid gland     HYPOTHYROIDISM    Essential hypertension     Gastric ulcer     GERD (gastroesophageal reflux disease)     Heart murmur     History of transfusion     NO ISSUES POST TRANSFUSION WAS MANY  YEARS AGO    Hyperlipemia     Leukocytopenia     FOLLOWED BY DR MIR BAILEY    Limb swelling     Lumbago     Lumbar spinal stenosis     Lung cancer     Migraine headache     Multiple joint pain     On home oxygen therapy     3L/NC PRN    Osteopenia     Reflux esophagitis     Shortness of breath     Thyroid nodule     HAS ULTRASOUND YEARLY BEING MONITORED    Vascular disease     Vitamin D deficiency        Past Surgical History:   Procedure Laterality Date    ABDOMINAL SURGERY      APPENDECTOMY      ARTERIOVENOUS FISTULA/SHUNT SURGERY Left 05/10/2022    Procedure: Left basilic vein transposition;  Surgeon: Wan Barksdale MD;  Location: [facility] MAIN OR;  Service: Vascular;  Laterality: Left;    ARTERIOVENOUS FISTULA/SHUNT SURGERY Left 3/23/2023    Procedure: Ligation of left arm arteriovenous fistula;  Surgeon: Wan Barksdale MD;  Location: [facility] MAIN OR;  Service: Vascular;  Laterality: Left;    ARTERIOVENOUS FISTULA/SHUNT SURGERY Left 11/30/2023    Procedure: Creation of left arm arteriovenous graft;  Surgeon: Wan Barksdale MD;  Location: [facility] OR;  Service: Vascular;  Laterality: Left;    CARDIAC CATHETERIZATION  1996    CARDIAC SURGERY      CARDIAC SURGERY      fluid drained from heart    CATARACT EXTRACTION, BILATERAL  2003    COLONOSCOPY  2014    ENDOSCOPY  2016    2019    FEMORAL ARTERY STENT Bilateral     HYSTERECTOMY      LUNG BIOPSY Left 2005    lobectomy upper lung caner    LUNG VOLUME REDUCTION      OTHER SURGICAL HISTORY      artifical joints/limbs    REPLACEMENT TOTAL KNEE Left 2016    UPPER GASTROINTESTINAL ENDOSCOPY         Family History: family history includes Arthritis in her father and mother; Cancer in her brother, brother, brother, and brother; Diabetes in her brother; Other in her brother; Prostate cancer in her brother and brother. Otherwise pertinent FHx was reviewed and not pertinent to current issue.    Social History:  reports that she quit smoking about 19 years ago. Her smoking  use included cigarettes. She started smoking about 72 years ago. She has a 52.5 pack-year smoking history. She has been exposed to tobacco smoke. She has never used smokeless tobacco. She reports that she does not drink alcohol and does not use drugs.    Home Medications:  HYDROcodone-acetaminophen, albuterol sulfate HFA, budesonide-formoterol, carvedilol, dexlansoprazole, ergocalciferol, hydrALAZINE, levocetirizine, linagliptin, nitroglycerin, polycarbophil, rosuvastatin, and sevelamer      Allergies:  No Known Allergies    Objective   Objective     Vitals:   Temp:  [97 °F (36.1 °C)] 97 °F (36.1 °C)  Heart Rate:  [69-86] 73  Resp:  [16-24] 16  BP: (115-185)/(45-70) 171/56  Flow (L/min):  [2] 2    Physical Exam    Elderly female in mild respiratory distress getting dialysis now.  HEENT unremarkable.  Neck supple using minimal accessory muscles of the neck.  Heart regular.  Lungs diminished breath sounds with crackles both side.  Abdomen is soft and obese nontender.  Extremities with trace of edema.  Tenderness in the right groin no mass.  Range of movement of right leg causing pain      I have personally reviewed the results from the time of this admission to 3/13/2024 16:50 EDT and agree with these findings:  [x]  Laboratory  [x]  Microbiology  [x]  Radiology  []  EKG/Telemetry   []  Cardiology/Vascular   []  Pathology  []  Old records  []  Other:    CBC:    WBC   Date Value Ref Range Status   03/13/2024 8.41 3.40 - 10.80 10*3/mm3 Final     RBC   Date Value Ref Range Status   03/13/2024 3.24 (L) 3.77 - 5.28 10*6/mm3 Final     Hemoglobin   Date Value Ref Range Status   03/13/2024 9.7 (L) 12.0 - 15.9 g/dL Final     Hematocrit   Date Value Ref Range Status   03/13/2024 32.9 (L) 34.0 - 46.6 % Final     MCV   Date Value Ref Range Status   03/13/2024 101.5 (H) 79.0 - 97.0 fL Final     MCH   Date Value Ref Range Status   03/13/2024 29.9 26.6 - 33.0 pg Final     MCHC   Date Value Ref Range Status   03/13/2024 29.5 (L) 31.5  - 35.7 g/dL Final     RDW   Date Value Ref Range Status   03/13/2024 14.8 12.3 - 15.4 % Final     RDW-SD   Date Value Ref Range Status   03/13/2024 55.7 (H) 37.0 - 54.0 fl Final     MPV   Date Value Ref Range Status   03/13/2024 9.7 6.0 - 12.0 fL Final     Platelets   Date Value Ref Range Status   03/13/2024 226 140 - 450 10*3/mm3 Final     Neutrophil %   Date Value Ref Range Status   03/13/2024 74.0 42.7 - 76.0 % Final     Lymphocyte %   Date Value Ref Range Status   03/13/2024 9.0 (L) 19.6 - 45.3 % Final     Monocyte %   Date Value Ref Range Status   03/13/2024 13.2 (H) 5.0 - 12.0 % Final     Eosinophil %   Date Value Ref Range Status   03/13/2024 3.2 0.3 - 6.2 % Final     Basophil %   Date Value Ref Range Status   03/13/2024 0.4 0.0 - 1.5 % Final     Immature Grans %   Date Value Ref Range Status   03/13/2024 0.2 0.0 - 0.5 % Final     Neutrophils, Absolute   Date Value Ref Range Status   03/13/2024 6.22 1.70 - 7.00 10*3/mm3 Final     Lymphocytes, Absolute   Date Value Ref Range Status   03/13/2024 0.76 0.70 - 3.10 10*3/mm3 Final     Monocytes, Absolute   Date Value Ref Range Status   03/13/2024 1.11 (H) 0.10 - 0.90 10*3/mm3 Final     Eosinophils, Absolute   Date Value Ref Range Status   03/13/2024 0.27 0.00 - 0.40 10*3/mm3 Final     Basophils, Absolute   Date Value Ref Range Status   03/13/2024 0.03 0.00 - 0.20 10*3/mm3 Final     Immature Grans, Absolute   Date Value Ref Range Status   03/13/2024 0.02 0.00 - 0.05 10*3/mm3 Final     nRBC   Date Value Ref Range Status   03/13/2024 0.0 0.0 - 0.2 /100 WBC Final        BMP:    Lab Results   Component Value Date    GLUCOSE 122 (H) 03/13/2024    BUN 52 (H) 03/13/2024    CREATININE 4.46 (H) 03/13/2024    EGFRIFNONA 17 (L) 01/10/2022    BCR 11.7 03/13/2024    K 4.8 03/13/2024    CO2 23.6 03/13/2024    CALCIUM 8.5 (L) 03/13/2024    ALBUMIN 3.6 03/13/2024    LABIL2 1.5 05/05/2021    AST 12 03/13/2024    ALT 8 03/13/2024        CT Chest Without Contrast Diagnostic    Result  Date: 3/13/2024    1. Interlobular septal thickening suggesting pulmonary edema at right lower lobe with new small right pleural effusion. 2. Stable postsurgical changes of left upper lobectomy with pleural plaques in the left hemithorax and chronic left basilar scarring. 3. Stable posttreatment scarring involving the right upper lobe extending to the right hilum. 4. Perinephric/subcapsular hematoma at right kidney similar to recent abdominal CT. 5. Additional chronic findings above.     NAJMA HOGAN MD       Electronically Signed and Approved By: NAJMA HOGAN MD on 3/13/2024 at 12:01             XR Chest 1 View    Result Date: 3/13/2024     1. Interval improvement in right basilar airspace and interstitial opacities.  Trace right effusion.  2. Stable chronic changes and volume loss in the left thorax.        LINDSEY PACHECO MD       Electronically Signed and Approved By: LINDSEY PACHECO MD on 3/13/2024 at 9:41                     Assessment & Plan   Assessment / Plan       Current Diagnosis:  Active Hospital Problems    Diagnosis     **Pulmonary edema     ESRD (end stage renal disease)     Type 2 diabetes mellitus      Plan:   Admit to hospital  Patient appears to be volume overloaded.  Given Lasix in ER.  Nephrology consulted.  For dialysis today.  Restart essential home meds.  Ultrasound kidney bilateral for history of perinephric hematoma as well as pain on the right side extending into the right leg.  Monitor sugars.  Further management we will then clinical course, lab results and consultant input      DVT prophylaxis:  Medical DVT prophylaxis orders are signed and held.          GI Prophylaxis:       Pepcid    CODE STATUS:    Code Status (Patient has no pulse and is not breathing): CPR (Attempt to Resuscitate)  Medical Interventions (Patient has pulse or is breathing): Full Support    Admission Status:  I believe this patient meets inpatient status.             I have dictated this note utilizing Dragon Dictation.              Please note that portions of this note were completed with a voice recognition program.             Part of this note may be an electronic transcription/translation of spoken language to printed text         using the Dragon Dictation System.       Electronically signed by Rafael Lyons MD, 03/13/24, 4:47 PM EDT.    Total time spent with in evaluation and management:

## 2024-03-14 ENCOUNTER — APPOINTMENT (OUTPATIENT)
Dept: CT IMAGING | Facility: HOSPITAL | Age: 89
DRG: 291 | End: 2024-03-14
Payer: MEDICARE

## 2024-03-14 LAB
ALBUMIN SERPL-MCNC: 3.4 G/DL (ref 3.5–5.2)
ALBUMIN SERPL-MCNC: 3.5 G/DL (ref 3.5–5.2)
ALBUMIN/GLOB SERPL: 1.2 G/DL
ALP SERPL-CCNC: 183 U/L (ref 39–117)
ALT SERPL W P-5'-P-CCNC: 8 U/L (ref 1–33)
ANION GAP SERPL CALCULATED.3IONS-SCNC: 12.2 MMOL/L (ref 5–15)
ANION GAP SERPL CALCULATED.3IONS-SCNC: 12.3 MMOL/L (ref 5–15)
AST SERPL-CCNC: 12 U/L (ref 1–32)
BASOPHILS # BLD AUTO: 0.04 10*3/MM3 (ref 0–0.2)
BASOPHILS NFR BLD AUTO: 0.6 % (ref 0–1.5)
BILIRUB SERPL-MCNC: 0.3 MG/DL (ref 0–1.2)
BUN SERPL-MCNC: 31 MG/DL (ref 8–23)
BUN SERPL-MCNC: 31 MG/DL (ref 8–23)
BUN/CREAT SERPL: 10.3 (ref 7–25)
BUN/CREAT SERPL: 10.4 (ref 7–25)
CALCIUM SPEC-SCNC: 8.9 MG/DL (ref 8.6–10.5)
CALCIUM SPEC-SCNC: 8.9 MG/DL (ref 8.6–10.5)
CHLORIDE SERPL-SCNC: 96 MMOL/L (ref 98–107)
CHLORIDE SERPL-SCNC: 99 MMOL/L (ref 98–107)
CO2 SERPL-SCNC: 27.7 MMOL/L (ref 22–29)
CO2 SERPL-SCNC: 28.8 MMOL/L (ref 22–29)
CREAT SERPL-MCNC: 2.99 MG/DL (ref 0.57–1)
CREAT SERPL-MCNC: 3 MG/DL (ref 0.57–1)
DEPRECATED RDW RBC AUTO: 53.6 FL (ref 37–54)
DEPRECATED RDW RBC AUTO: 54.6 FL (ref 37–54)
EGFRCR SERPLBLD CKD-EPI 2021: 14.6 ML/MIN/1.73
EGFRCR SERPLBLD CKD-EPI 2021: 14.7 ML/MIN/1.73
EOSINOPHIL # BLD AUTO: 0.23 10*3/MM3 (ref 0–0.4)
EOSINOPHIL NFR BLD AUTO: 3.4 % (ref 0.3–6.2)
ERYTHROCYTE [DISTWIDTH] IN BLOOD BY AUTOMATED COUNT: 14.6 % (ref 12.3–15.4)
ERYTHROCYTE [DISTWIDTH] IN BLOOD BY AUTOMATED COUNT: 14.6 % (ref 12.3–15.4)
GLOBULIN UR ELPH-MCNC: 2.9 GM/DL
GLUCOSE BLDC GLUCOMTR-MCNC: 108 MG/DL (ref 70–99)
GLUCOSE BLDC GLUCOMTR-MCNC: 114 MG/DL (ref 70–99)
GLUCOSE BLDC GLUCOMTR-MCNC: 116 MG/DL (ref 70–99)
GLUCOSE BLDC GLUCOMTR-MCNC: 77 MG/DL (ref 70–99)
GLUCOSE SERPL-MCNC: 77 MG/DL (ref 65–99)
GLUCOSE SERPL-MCNC: 80 MG/DL (ref 65–99)
HCT VFR BLD AUTO: 30.9 % (ref 34–46.6)
HCT VFR BLD AUTO: 31.8 % (ref 34–46.6)
HGB BLD-MCNC: 9.2 G/DL (ref 12–15.9)
HGB BLD-MCNC: 9.4 G/DL (ref 12–15.9)
IMM GRANULOCYTES # BLD AUTO: 0.01 10*3/MM3 (ref 0–0.05)
IMM GRANULOCYTES NFR BLD AUTO: 0.1 % (ref 0–0.5)
LYMPHOCYTES # BLD AUTO: 0.98 10*3/MM3 (ref 0.7–3.1)
LYMPHOCYTES NFR BLD AUTO: 14.3 % (ref 19.6–45.3)
MCH RBC QN AUTO: 29.9 PG (ref 26.6–33)
MCH RBC QN AUTO: 30.1 PG (ref 26.6–33)
MCHC RBC AUTO-ENTMCNC: 29.6 G/DL (ref 31.5–35.7)
MCHC RBC AUTO-ENTMCNC: 29.8 G/DL (ref 31.5–35.7)
MCV RBC AUTO: 101 FL (ref 79–97)
MCV RBC AUTO: 101.3 FL (ref 79–97)
MONOCYTES # BLD AUTO: 1.17 10*3/MM3 (ref 0.1–0.9)
MONOCYTES NFR BLD AUTO: 17.1 % (ref 5–12)
NEUTROPHILS NFR BLD AUTO: 4.42 10*3/MM3 (ref 1.7–7)
NEUTROPHILS NFR BLD AUTO: 64.5 % (ref 42.7–76)
NRBC BLD AUTO-RTO: 0 /100 WBC (ref 0–0.2)
PHOSPHATE SERPL-MCNC: 4.7 MG/DL (ref 2.5–4.5)
PLATELET # BLD AUTO: 207 10*3/MM3 (ref 140–450)
PLATELET # BLD AUTO: 224 10*3/MM3 (ref 140–450)
PMV BLD AUTO: 10.5 FL (ref 6–12)
PMV BLD AUTO: 9.2 FL (ref 6–12)
POTASSIUM SERPL-SCNC: 3.9 MMOL/L (ref 3.5–5.2)
POTASSIUM SERPL-SCNC: 4.4 MMOL/L (ref 3.5–5.2)
PROT SERPL-MCNC: 6.4 G/DL (ref 6–8.5)
RBC # BLD AUTO: 3.06 10*6/MM3 (ref 3.77–5.28)
RBC # BLD AUTO: 3.14 10*6/MM3 (ref 3.77–5.28)
SODIUM SERPL-SCNC: 136 MMOL/L (ref 136–145)
SODIUM SERPL-SCNC: 140 MMOL/L (ref 136–145)
WBC NRBC COR # BLD AUTO: 6.85 10*3/MM3 (ref 3.4–10.8)
WBC NRBC COR # BLD AUTO: 7.38 10*3/MM3 (ref 3.4–10.8)

## 2024-03-14 PROCEDURE — 85025 COMPLETE CBC W/AUTO DIFF WBC: CPT | Performed by: INTERNAL MEDICINE

## 2024-03-14 PROCEDURE — 36415 COLL VENOUS BLD VENIPUNCTURE: CPT | Performed by: INTERNAL MEDICINE

## 2024-03-14 PROCEDURE — 82948 REAGENT STRIP/BLOOD GLUCOSE: CPT | Performed by: INTERNAL MEDICINE

## 2024-03-14 PROCEDURE — 74176 CT ABD & PELVIS W/O CONTRAST: CPT

## 2024-03-14 PROCEDURE — 84100 ASSAY OF PHOSPHORUS: CPT | Performed by: INTERNAL MEDICINE

## 2024-03-14 PROCEDURE — 25010000002 HEPARIN (PORCINE) PER 1000 UNITS: Performed by: INTERNAL MEDICINE

## 2024-03-14 PROCEDURE — 94799 UNLISTED PULMONARY SVC/PX: CPT

## 2024-03-14 PROCEDURE — 80053 COMPREHEN METABOLIC PANEL: CPT | Performed by: INTERNAL MEDICINE

## 2024-03-14 PROCEDURE — 82948 REAGENT STRIP/BLOOD GLUCOSE: CPT

## 2024-03-14 PROCEDURE — 25010000002 ENOXAPARIN PER 10 MG: Performed by: INTERNAL MEDICINE

## 2024-03-14 PROCEDURE — 85027 COMPLETE CBC AUTOMATED: CPT | Performed by: INTERNAL MEDICINE

## 2024-03-14 RX ORDER — TEMAZEPAM 15 MG/1
15 CAPSULE ORAL NIGHTLY PRN
Status: DISCONTINUED | OUTPATIENT
Start: 2024-03-14 | End: 2024-03-15 | Stop reason: HOSPADM

## 2024-03-14 RX ORDER — ACETAMINOPHEN 325 MG/1
650 TABLET ORAL EVERY 4 HOURS PRN
Status: DISCONTINUED | OUTPATIENT
Start: 2024-03-14 | End: 2024-03-15 | Stop reason: HOSPADM

## 2024-03-14 RX ORDER — SODIUM CHLORIDE 0.9 % (FLUSH) 0.9 %
10 SYRINGE (ML) INJECTION AS NEEDED
Status: DISCONTINUED | OUTPATIENT
Start: 2024-03-14 | End: 2024-03-15 | Stop reason: HOSPADM

## 2024-03-14 RX ORDER — SODIUM CHLORIDE 0.9 % (FLUSH) 0.9 %
10 SYRINGE (ML) INJECTION EVERY 12 HOURS SCHEDULED
Status: DISCONTINUED | OUTPATIENT
Start: 2024-03-14 | End: 2024-03-15 | Stop reason: HOSPADM

## 2024-03-14 RX ORDER — POLYETHYLENE GLYCOL 3350 17 G/17G
17 POWDER, FOR SOLUTION ORAL DAILY PRN
Status: DISCONTINUED | OUTPATIENT
Start: 2024-03-14 | End: 2024-03-15 | Stop reason: HOSPADM

## 2024-03-14 RX ORDER — NITROGLYCERIN 0.4 MG/1
0.4 TABLET SUBLINGUAL
Status: DISCONTINUED | OUTPATIENT
Start: 2024-03-14 | End: 2024-03-15 | Stop reason: HOSPADM

## 2024-03-14 RX ORDER — SEVELAMER CARBONATE 800 MG/1
800 TABLET, FILM COATED ORAL
Status: DISCONTINUED | OUTPATIENT
Start: 2024-03-14 | End: 2024-03-15 | Stop reason: HOSPADM

## 2024-03-14 RX ORDER — AMOXICILLIN 250 MG
2 CAPSULE ORAL 2 TIMES DAILY PRN
Status: DISCONTINUED | OUTPATIENT
Start: 2024-03-14 | End: 2024-03-15 | Stop reason: HOSPADM

## 2024-03-14 RX ORDER — FUROSEMIDE 10 MG/ML
40 INJECTION INTRAMUSCULAR; INTRAVENOUS EVERY 12 HOURS
Status: DISCONTINUED | OUTPATIENT
Start: 2024-03-14 | End: 2024-03-14

## 2024-03-14 RX ORDER — NALOXONE HCL 0.4 MG/ML
0.4 VIAL (ML) INJECTION
Status: DISCONTINUED | OUTPATIENT
Start: 2024-03-14 | End: 2024-03-15 | Stop reason: HOSPADM

## 2024-03-14 RX ORDER — ENOXAPARIN SODIUM 100 MG/ML
30 INJECTION SUBCUTANEOUS EVERY 24 HOURS
Status: DISCONTINUED | OUTPATIENT
Start: 2024-03-14 | End: 2024-03-14

## 2024-03-14 RX ORDER — ONDANSETRON 2 MG/ML
4 INJECTION INTRAMUSCULAR; INTRAVENOUS EVERY 4 HOURS PRN
Status: DISCONTINUED | OUTPATIENT
Start: 2024-03-14 | End: 2024-03-15 | Stop reason: HOSPADM

## 2024-03-14 RX ORDER — CARVEDILOL 25 MG/1
25 TABLET ORAL 2 TIMES DAILY WITH MEALS
Status: DISCONTINUED | OUTPATIENT
Start: 2024-03-14 | End: 2024-03-15 | Stop reason: HOSPADM

## 2024-03-14 RX ORDER — BISACODYL 10 MG
10 SUPPOSITORY, RECTAL RECTAL DAILY PRN
Status: DISCONTINUED | OUTPATIENT
Start: 2024-03-14 | End: 2024-03-15 | Stop reason: HOSPADM

## 2024-03-14 RX ORDER — HYDRALAZINE HYDROCHLORIDE 25 MG/1
25 TABLET, FILM COATED ORAL EVERY 12 HOURS SCHEDULED
Status: DISCONTINUED | OUTPATIENT
Start: 2024-03-14 | End: 2024-03-15 | Stop reason: HOSPADM

## 2024-03-14 RX ORDER — FAMOTIDINE 20 MG/1
40 TABLET, FILM COATED ORAL DAILY
Status: DISCONTINUED | OUTPATIENT
Start: 2024-03-14 | End: 2024-03-15

## 2024-03-14 RX ORDER — ALUMINA, MAGNESIA, AND SIMETHICONE 2400; 2400; 240 MG/30ML; MG/30ML; MG/30ML
15 SUSPENSION ORAL EVERY 6 HOURS PRN
Status: DISCONTINUED | OUTPATIENT
Start: 2024-03-14 | End: 2024-03-15 | Stop reason: HOSPADM

## 2024-03-14 RX ORDER — HEPARIN SODIUM 5000 [USP'U]/ML
5000 INJECTION, SOLUTION INTRAVENOUS; SUBCUTANEOUS EVERY 8 HOURS SCHEDULED
Status: DISCONTINUED | OUTPATIENT
Start: 2024-03-14 | End: 2024-03-15 | Stop reason: HOSPADM

## 2024-03-14 RX ORDER — SODIUM CHLORIDE 9 MG/ML
40 INJECTION, SOLUTION INTRAVENOUS AS NEEDED
Status: DISCONTINUED | OUTPATIENT
Start: 2024-03-14 | End: 2024-03-15 | Stop reason: HOSPADM

## 2024-03-14 RX ORDER — BISACODYL 5 MG/1
5 TABLET, DELAYED RELEASE ORAL DAILY PRN
Status: DISCONTINUED | OUTPATIENT
Start: 2024-03-14 | End: 2024-03-15 | Stop reason: HOSPADM

## 2024-03-14 RX ADMIN — HYDROCODONE BITARTRATE AND ACETAMINOPHEN 1 TABLET: 10; 325 TABLET ORAL at 07:31

## 2024-03-14 RX ADMIN — ENOXAPARIN SODIUM 30 MG: 100 INJECTION SUBCUTANEOUS at 10:29

## 2024-03-14 RX ADMIN — HYDROCODONE BITARTRATE AND ACETAMINOPHEN 1 TABLET: 10; 325 TABLET ORAL at 11:27

## 2024-03-14 RX ADMIN — HYDROCODONE BITARTRATE AND ACETAMINOPHEN 1 TABLET: 10; 325 TABLET ORAL at 20:16

## 2024-03-14 RX ADMIN — IPRATROPIUM BROMIDE AND ALBUTEROL SULFATE 3 ML: .5; 3 SOLUTION RESPIRATORY (INHALATION) at 00:35

## 2024-03-14 RX ADMIN — IPRATROPIUM BROMIDE AND ALBUTEROL SULFATE 3 ML: .5; 3 SOLUTION RESPIRATORY (INHALATION) at 11:54

## 2024-03-14 RX ADMIN — IPRATROPIUM BROMIDE AND ALBUTEROL SULFATE 3 ML: .5; 3 SOLUTION RESPIRATORY (INHALATION) at 03:00

## 2024-03-14 RX ADMIN — IPRATROPIUM BROMIDE AND ALBUTEROL SULFATE 3 ML: .5; 3 SOLUTION RESPIRATORY (INHALATION) at 20:24

## 2024-03-14 RX ADMIN — IPRATROPIUM BROMIDE AND ALBUTEROL SULFATE 3 ML: .5; 3 SOLUTION RESPIRATORY (INHALATION) at 07:16

## 2024-03-14 RX ADMIN — LINAGLIPTIN 5 MG: 5 TABLET, FILM COATED ORAL at 10:28

## 2024-03-14 RX ADMIN — HYDROCODONE BITARTRATE AND ACETAMINOPHEN 1 TABLET: 10; 325 TABLET ORAL at 02:01

## 2024-03-14 RX ADMIN — SEVELAMER CARBONATE 800 MG: 800 TABLET, FILM COATED ORAL at 18:03

## 2024-03-14 RX ADMIN — Medication 10 ML: at 20:16

## 2024-03-14 RX ADMIN — Medication 10 ML: at 10:29

## 2024-03-14 RX ADMIN — HYDROCODONE BITARTRATE AND ACETAMINOPHEN 1 TABLET: 10; 325 TABLET ORAL at 15:45

## 2024-03-14 RX ADMIN — HYDRALAZINE HYDROCHLORIDE 25 MG: 25 TABLET ORAL at 20:15

## 2024-03-14 RX ADMIN — TORSEMIDE 100 MG: 20 TABLET ORAL at 10:28

## 2024-03-14 RX ADMIN — SEVELAMER CARBONATE 800 MG: 800 TABLET, FILM COATED ORAL at 11:27

## 2024-03-14 RX ADMIN — HEPARIN SODIUM 5000 UNITS: 5000 INJECTION INTRAVENOUS; SUBCUTANEOUS at 20:15

## 2024-03-14 RX ADMIN — HYDRALAZINE HYDROCHLORIDE 25 MG: 25 TABLET ORAL at 10:28

## 2024-03-14 RX ADMIN — IPRATROPIUM BROMIDE AND ALBUTEROL SULFATE 3 ML: .5; 3 SOLUTION RESPIRATORY (INHALATION) at 15:09

## 2024-03-14 RX ADMIN — FAMOTIDINE 40 MG: 20 TABLET ORAL at 10:28

## 2024-03-14 NOTE — OUTREACH NOTE
Medical Week 1 Survey      Flowsheet Row Responses   [facility] patient discharged from? [place]   Does the patient have one of the following disease processes/diagnoses(primary or secondary)? Other   Week 1 attempt successful? No   Unsuccessful attempts Attempt 2   Revoke Readmitted            ADRIENNE BRUNO - Registered Nurse

## 2024-03-14 NOTE — PROGRESS NOTES
[facility]     Nephrology Progress Note      Patient Name: Charlette Rai  : 1935  MRN: [id number]  Primary Care Physician:  Rafael Lyons MD  Date of admission: 3/13/2024    Subjective   Subjective     Interval History:  Patient Reports feeling better this morning, less short of breath.  Last night she had leg cramps and back and abdominal pain.  No fevers or chills.  Status post dialysis, 3 to 4 L of ultrafiltration achieved.  Sitting in bed, eating breakfast.    Review of Systems   All systems were reviewed and negative except for: What is mentioned above.    Objective   Objective     Vitals:   Temp:  [97 °F (36.1 °C)-98.2 °F (36.8 °C)] 98.1 °F (36.7 °C)  Heart Rate:  [67-86] 70  Resp:  [16-24] 18  BP: (115-185)/(44-70) 152/44  Flow (L/min):  [2] 2  Physical Exam:   Constitutional: Awake, alert   Eyes: sclerae anicteric, no conjunctival injection   HENT: mucous membranes moist   Neck: Supple, no thyromegaly, no lymphadenopathy, trachea midline, mild JVD   Respiratory: Decreased to auscultation bilaterally, nonlabored respirations    Cardiovascular: RRR, no murmurs, rubs, or gallops.   Gastrointestinal: Positive bowel sounds, soft, nontender, nondistended   Musculoskeletal: No edema, no clubbing or cyanosis, left upper extremity AV graft, patent, some bruising noted.   Psychiatric: Appropriate affect, cooperative   Neurologic: Oriented x 3, moving all extremities, Cranial Nerves grossly intact, speech clear   Skin: warm and dry, no rashes     Result Review    Result Reviewed:  I have personally reviewed the results from the time of this admission to 3/14/2024 09:06 EDT and agree with these findings:  [x]  Laboratory  []  Microbiology  [x]  Radiology  []  EKG/Telemetry   []  Cardiology/Vascular   []  Pathology  []  Old records  []  Other:        Lab 24  0724 24  0426 24  0940   SODIUM 140 136 139   POTASSIUM 4.4 3.9 4.8   CHLORIDE 99 96* 101   CO2 28.8 27.7 23.6   BUN 31* 31* 52*    CREATININE 2.99* 3.00* 4.46*   GLUCOSE 80 77 122*   EGFR 14.7* 14.6* 9.1*   ANION GAP 12.2 12.3 14.4   PHOSPHORUS  --  4.7*  --            Most notable findings include: Labs reviewed.    Assessment & Plan   Assessment / Plan       Active Hospital Problems:  Active Hospital Problems    Diagnosis     **Pulmonary edema     ESRD (end stage renal disease)     Type 2 diabetes mellitus        Assessment and Plan:    - ESRD, dialysis MWF schedule through left upper extremity AVG  at [facility].  Volume status is better.  Dialysis tomorrow here or outpatient.  Will try to keep her close to dry weight.  Discussed with her to limit fluid intake.  Currently on 1500 cc p.o. fluid restriction per day.  - Anemia of chronic kidney disease, hemoglobin is at goal.  - Shortness of breath likely related to increased volume, improved.  Chest x-ray is abnormal though improved from previous.  - Recent [facility] admission with spontaneous right perinephric subcapsular hematoma.  Will obtain CT scan abdomen pelvis without contrast given her pain last night.  No gross hematuria.  - Left renal lesion will need urology evaluation outpatient.  - Severe hypertension, blood pressure is better this morning.    - Diastolic heart failure, last 2D echo from October showed grade 1 impaired relaxation with hypertrophy moderate aortic stenosis is present.  - Diabetes type 2 with complications.  - Bilateral renal artery stenosis.  - History of lung cancer and COPD.  On O2 per nasal cannula  - Hyperphosphatemia, resume Renvela with meals.  Discussed with primary.  Will follow.    Electronically signed by Edi García MD, 3/14/2024, 09:06 EDT.

## 2024-03-14 NOTE — PROGRESS NOTES
[facility]     Progress Note    Patient Name: Charlette Rai  : 1935  MRN: [id number]  Primary Care Physician:  Rafael Lyons MD  Date of admission: 3/13/2024      Subjective   Brief summary.  Patient admitted with volume overload/pulmonary edema      HPI:  She is feeling much better today, postdialysis yesterday.  Still achy and hurts, hurts in the right side and groin area.    Review of Systems     No fever or chills, no chest pain, some abdominal discomfort.  No nausea vomiting    Objective     Vitals:   Temp:  [97.3 °F (36.3 °C)-98.2 °F (36.8 °C)] 97.3 °F (36.3 °C)  Heart Rate:  [67-88] 88  Resp:  [16-20] 18  BP: (112-178)/(44-63) 112/50  Flow (L/min):  [2] 2    Physical Exam :     Elderly female not in acute distress.  Heart regular.  Lungs bilateral crackles at the bases.  Abdomen soft obese.  Extremities trace of edema      Result Review:  I have personally reviewed the results from the time of this admission to 3/14/2024 16:37 EDT and agree with these findings:  [x]  Laboratory  []  Microbiology  []  Radiology  []  EKG/Telemetry   []  Cardiology/Vascular   []  Pathology  []  Old records  []  Other:           Assessment / Plan       Active Hospital Problems:  Active Hospital Problems    Diagnosis     **Pulmonary edema     ESRD (end stage renal disease)     Type 2 diabetes mellitus        Plan:   Improving, continue dialysis.  Patient had some abdominal discomfort with recent renal hematoma we will proceed with CT noncontrast for now.  Discussed with nephrologist.  For dialysis in AM.  If remains stable possible discharge tomorrow       DVT prophylaxis:  Medical DVT prophylaxis orders are present.        CODE STATUS:   Code Status (Patient has no pulse and is not breathing): CPR (Attempt to Resuscitate)  Medical Interventions (Patient has pulse or is breathing): Full Support            Electronically signed by Rafael Lyons MD, 24, 4:37 PM EDT.

## 2024-03-14 NOTE — PLAN OF CARE
Goal Outcome Evaluation:   VSS. Pain managed per mar. Dialysis tomorrow.

## 2024-03-14 NOTE — PLAN OF CARE
Goal Outcome Evaluation:  Plan of Care Reviewed With: patient    VS stable throughout the night; no adverse events        Progress: improving

## 2024-03-15 VITALS
OXYGEN SATURATION: 94 % | HEART RATE: 79 BPM | SYSTOLIC BLOOD PRESSURE: 109 MMHG | RESPIRATION RATE: 18 BRPM | BODY MASS INDEX: 23.47 KG/M2 | TEMPERATURE: 98.2 F | HEIGHT: 65 IN | DIASTOLIC BLOOD PRESSURE: 58 MMHG | WEIGHT: 140.87 LBS

## 2024-03-15 PROBLEM — J81.1 PULMONARY EDEMA: Status: RESOLVED | Noted: 2023-10-29 | Resolved: 2024-03-15

## 2024-03-15 LAB
ALBUMIN SERPL-MCNC: 3.4 G/DL (ref 3.5–5.2)
ANION GAP SERPL CALCULATED.3IONS-SCNC: 12.4 MMOL/L (ref 5–15)
BUN SERPL-MCNC: 46 MG/DL (ref 8–23)
BUN/CREAT SERPL: 12 (ref 7–25)
CALCIUM SPEC-SCNC: 8.7 MG/DL (ref 8.6–10.5)
CHLORIDE SERPL-SCNC: 95 MMOL/L (ref 98–107)
CO2 SERPL-SCNC: 26.6 MMOL/L (ref 22–29)
CREAT SERPL-MCNC: 3.83 MG/DL (ref 0.57–1)
DEPRECATED RDW RBC AUTO: 52.8 FL (ref 37–54)
EGFRCR SERPLBLD CKD-EPI 2021: 10.9 ML/MIN/1.73
ERYTHROCYTE [DISTWIDTH] IN BLOOD BY AUTOMATED COUNT: 14.6 % (ref 12.3–15.4)
GLUCOSE BLDC GLUCOMTR-MCNC: 118 MG/DL (ref 70–99)
GLUCOSE BLDC GLUCOMTR-MCNC: 79 MG/DL (ref 70–99)
GLUCOSE SERPL-MCNC: 101 MG/DL (ref 65–99)
HCT VFR BLD AUTO: 30 % (ref 34–46.6)
HGB BLD-MCNC: 9.2 G/DL (ref 12–15.9)
MAGNESIUM SERPL-MCNC: 2.2 MG/DL (ref 1.6–2.4)
MCH RBC QN AUTO: 30.4 PG (ref 26.6–33)
MCHC RBC AUTO-ENTMCNC: 30.7 G/DL (ref 31.5–35.7)
MCV RBC AUTO: 99 FL (ref 79–97)
PHOSPHATE SERPL-MCNC: 6 MG/DL (ref 2.5–4.5)
PLATELET # BLD AUTO: 210 10*3/MM3 (ref 140–450)
PMV BLD AUTO: 9.5 FL (ref 6–12)
POTASSIUM SERPL-SCNC: 4.4 MMOL/L (ref 3.5–5.2)
RBC # BLD AUTO: 3.03 10*6/MM3 (ref 3.77–5.28)
SODIUM SERPL-SCNC: 134 MMOL/L (ref 136–145)
WBC NRBC COR # BLD AUTO: 6.31 10*3/MM3 (ref 3.4–10.8)

## 2024-03-15 PROCEDURE — 94664 DEMO&/EVAL PT USE INHALER: CPT

## 2024-03-15 PROCEDURE — 83735 ASSAY OF MAGNESIUM: CPT | Performed by: INTERNAL MEDICINE

## 2024-03-15 PROCEDURE — 25010000002 HEPARIN (PORCINE) PER 1000 UNITS: Performed by: INTERNAL MEDICINE

## 2024-03-15 PROCEDURE — 85027 COMPLETE CBC AUTOMATED: CPT | Performed by: INTERNAL MEDICINE

## 2024-03-15 PROCEDURE — 94799 UNLISTED PULMONARY SVC/PX: CPT

## 2024-03-15 PROCEDURE — 80069 RENAL FUNCTION PANEL: CPT | Performed by: INTERNAL MEDICINE

## 2024-03-15 PROCEDURE — 82948 REAGENT STRIP/BLOOD GLUCOSE: CPT

## 2024-03-15 RX ORDER — FAMOTIDINE 10 MG
10 TABLET ORAL DAILY
Status: DISCONTINUED | OUTPATIENT
Start: 2024-03-16 | End: 2024-03-15 | Stop reason: HOSPADM

## 2024-03-15 RX ADMIN — IPRATROPIUM BROMIDE AND ALBUTEROL SULFATE 3 ML: .5; 3 SOLUTION RESPIRATORY (INHALATION) at 00:43

## 2024-03-15 RX ADMIN — HEPARIN SODIUM 5000 UNITS: 5000 INJECTION INTRAVENOUS; SUBCUTANEOUS at 05:24

## 2024-03-15 RX ADMIN — SEVELAMER CARBONATE 800 MG: 800 TABLET, FILM COATED ORAL at 14:25

## 2024-03-15 RX ADMIN — SEVELAMER CARBONATE 800 MG: 800 TABLET, FILM COATED ORAL at 08:01

## 2024-03-15 RX ADMIN — CARVEDILOL 25 MG: 25 TABLET, FILM COATED ORAL at 08:01

## 2024-03-15 RX ADMIN — LINAGLIPTIN 5 MG: 5 TABLET, FILM COATED ORAL at 09:03

## 2024-03-15 RX ADMIN — HYDROCODONE BITARTRATE AND ACETAMINOPHEN 1 TABLET: 10; 325 TABLET ORAL at 09:03

## 2024-03-15 RX ADMIN — Medication 10 ML: at 09:03

## 2024-03-15 RX ADMIN — IPRATROPIUM BROMIDE AND ALBUTEROL SULFATE 3 ML: .5; 3 SOLUTION RESPIRATORY (INHALATION) at 14:53

## 2024-03-15 RX ADMIN — HYDROCODONE BITARTRATE AND ACETAMINOPHEN 1 TABLET: 10; 325 TABLET ORAL at 03:37

## 2024-03-15 RX ADMIN — IPRATROPIUM BROMIDE AND ALBUTEROL SULFATE 3 ML: .5; 3 SOLUTION RESPIRATORY (INHALATION) at 06:31

## 2024-03-15 RX ADMIN — FAMOTIDINE 40 MG: 20 TABLET ORAL at 09:03

## 2024-03-15 NOTE — DISCHARGE SUMMARY
[facility]         DISCHARGE SUMMARY    Patient Name: Charlette Rai  : 1935  MRN: [id number]    Date of Admission: 3/13/2024  Date of Discharge: March 15  Primary Care Physician: Rafael Lyons MD    Consults       Date and Time Order Name Status Description    3/13/2024 12:35 PM Inpatient Nephrology Consult Completed     3/13/2024 12:30 PM General MD Inpatient Consult      3/8/2024  8:44 AM Inpatient General Surgery Consult Completed     3/3/2024  4:39 PM Inpatient Urology Consult Completed     3/3/2024  4:39 PM Inpatient Nephrology Consult Completed             Presenting Problem:   CHF (congestive heart failure) [I50.9]  Congestive heart failure, unspecified HF chronicity, unspecified heart failure type [I50.9]    Active and Resolved Hospital Problems:  Active Hospital Problems    Diagnosis POA   • ESRD (end stage renal disease) [N18.6] Yes   • Type 2 diabetes mellitus [E11.9] Yes      Resolved Hospital Problems    Diagnosis POA   • **Pulmonary edema [J81.1] Yes     Acute on chronic diastolic congestive heart failure    Hospital Course     Hospital Course:  Charlette Rai is a 89 y.o. female with known history of chronic diastolic failure, history of end-stage renal disease on hemodialysis admitted with increasing shortness of breath.  BNP was high chest x-ray showed pulmonary edema.  ER physician was recommending admission patient to hospital for further care.    Patient has been compliant in taking dialysis as recommended.  She was admitted to monitored bed for pulmonary edema and CHF.    She was given additional doses of diuretics and nephrology was consulted.  She was taken to dialysis same day.    After 48 hours patient is feeling much better she had another dialysis today.  She was cleared by nephrologist for discharge.  She is feeling much better.    Patient is weak and unable to get up and move around.  She has COPD, CHF and end-stage renal disease and arthritis.  Patient's  mobility limitations impairs ability to participate in all ADLs mobility limitation cannot be resolved by cane or walker.  Patient can safely and independently maneuver the wheelchair and the home provides adequate access between rooms.  Use of wheelchair will improve patient's ability to participate in MR ADLs and patient will be using the wheelchair inside and outside the home.  Patient has a caregiver/family available if patient does not have upper body strength and mental capacity to self propel a wheelchair inside the home.    Due to request of wheelchair patient was prescribed a wheelchair for above issues.        DISCHARGE Follow Up Recommendations for labs and diagnostics:   Patient stable ready for discharge.  Advised to take medications as prescribed on discharge.  Recommended follow-up with consultants as advised.  Patient advised to return to ER if symptoms get worse.  Patient advised to follow-up with me/PCP post discharge in 1 week.      Day of Discharge     Vital Signs:  Temp:  [97.3 °F (36.3 °C)-98.4 °F (36.9 °C)] 98.4 °F (36.9 °C)  Heart Rate:  [70-88] 72  Resp:  [14-22] 16  BP: (106-161)/(45-83) 152/83  Flow (L/min):  [2] 2    Physical Exam:    Elderly female not in acute distress.  Heart regular.  Lungs clear today.  Diminished breath sounds.  Abdomen soft.  Extremities trace of edema.  Neuro awake alert and oriented.      Pertinent  and/or Most Recent Results     LAB RESULTS:      Lab 03/15/24  0427 03/14/24  0724 03/14/24  0426 03/13/24  0940 03/09/24  0531   WBC 6.31 6.85 7.38 8.41 5.75   HEMOGLOBIN 9.2* 9.4* 9.2* 9.7* 8.6*   HEMATOCRIT 30.0* 31.8* 30.9* 32.9* 27.2*   PLATELETS 210 207 224 226 186   NEUTROS ABS  --  4.42  --  6.22  --    IMMATURE GRANS (ABS)  --  0.01  --  0.02  --    LYMPHS ABS  --  0.98  --  0.76  --    MONOS ABS  --  1.17*  --  1.11*  --    EOS ABS  --  0.23  --  0.27  --    MCV 99.0* 101.3* 101.0* 101.5* 97.1*   D DIMER QUANT  --   --   --  4.30*  --          Lab  03/15/24  0427 03/14/24  0724 03/14/24  0426 03/13/24  0940   SODIUM 134* 140 136 139   POTASSIUM 4.4 4.4 3.9 4.8   CHLORIDE 95* 99 96* 101   CO2 26.6 28.8 27.7 23.6   ANION GAP 12.4 12.2 12.3 14.4   BUN 46* 31* 31* 52*   CREATININE 3.83* 2.99* 3.00* 4.46*   EGFR 10.9* 14.7* 14.6* 9.1*   GLUCOSE 101* 80 77 122*   CALCIUM 8.7 8.9 8.9 8.5*   MAGNESIUM 2.2  --   --   --    PHOSPHORUS 6.0*  --  4.7*  --          Lab 03/15/24  0427 03/14/24  0724 03/14/24  0426 03/13/24  0940   TOTAL PROTEIN  --  6.4  --  6.6   ALBUMIN 3.4* 3.5 3.4* 3.6   GLOBULIN  --  2.9  --  3.0   ALT (SGPT)  --  8  --  8   AST (SGOT)  --  12  --  12   BILIRUBIN  --  0.3  --  0.2   ALK PHOS  --  183*  --  206*         Lab 03/13/24  1243 03/13/24  0940   PROBNP  --  17,869.0*   HSTROP T 64* 73*                 Brief Urine Lab Results  (Last result in the past 365 days)        Color   Clarity   Blood   Leuk Est   Nitrite   Protein   CREAT   Urine HCG        03/03/24 1131 Yellow   Turbid   Moderate (2+)   Moderate (2+)   Negative   >=300 mg/dL (3+)                 Microbiology Results (last 10 days)       Procedure Component Value - Date/Time    COVID PRE-OP / PRE-PROCEDURE SCREENING ORDER (NO ISOLATION) - Swab, Nasopharynx [[id number]]  (Normal) Collected: 03/13/24 1028    Lab Status: Final result Specimen: Swab from Nasopharynx Updated: 03/13/24 1116    Narrative:      The following orders were created for panel order COVID PRE-OP / PRE-PROCEDURE SCREENING ORDER (NO ISOLATION) - Swab, Nasopharynx.  Procedure                               Abnormality         Status                     ---------                               -----------         ------                     COVID-19, FLU A/B, RSV P...[[id number]]  Normal              Final result                 Please view results for these tests on the individual orders.    COVID-19, FLU A/B, RSV PCR 1 HR TAT - Swab, Nasopharynx [[id number]]  (Normal) Collected: 03/13/24 1028    Lab Status: Final result  Specimen: Swab from Nasopharynx Updated: 03/13/24 1116     COVID19 Not Detected     Influenza A PCR Not Detected     Influenza B PCR Not Detected     RSV, PCR Not Detected    Narrative:      Fact sheet for providers: https://www.fda.gov/media/487635/download    Fact sheet for patients: https://www.fda.gov/media/558301/download    Test performed by PCR.            PROCEDURES:    CT Abdomen Pelvis Without Contrast    Result Date: 3/14/2024  Impression:   1. No significant change in right-sided perinephric/subcapsular hematoma. 2. Large amount of stool in the transverse and ascending colon which may relate to constipation. 3. Stable small right pleural effusion. 4. Cholelithiasis and additional chronic findings above. 1.    NAJMA HOGAN MD       Electronically Signed and Approved By: NAJMA HOGAN MD on 3/14/2024 at 16:12             CT Chest Without Contrast Diagnostic    Result Date: 3/13/2024  Impression:   1. Interlobular septal thickening suggesting pulmonary edema at right lower lobe with new small right pleural effusion. 2. Stable postsurgical changes of left upper lobectomy with pleural plaques in the left hemithorax and chronic left basilar scarring. 3. Stable posttreatment scarring involving the right upper lobe extending to the right hilum. 4. Perinephric/subcapsular hematoma at right kidney similar to recent abdominal CT. 5. Additional chronic findings above.     NAJMA HOGAN MD       Electronically Signed and Approved By: NAJMA HOGAN MD on 3/13/2024 at 12:01             XR Chest 1 View    Result Date: 3/13/2024  Impression:    1. Interval improvement in right basilar airspace and interstitial opacities.  Trace right effusion.  2. Stable chronic changes and volume loss in the left thorax.        LINDSEY PACHECO MD       Electronically Signed and Approved By: LINDSEY PACHECO MD on 3/13/2024 at 9:41             CT Abdomen Pelvis Without Contrast    Result Date: 3/6/2024  Impression: 1. Right renal subcapsular  hematoma measures up to 3.3 cm in thickness which is similar to that described on the CT abdomen and pelvis report 3 days ago. 2. 3.4 cm exophytic masslike density right lower pole suspicious for a solid renal mass and is a potential source for hematoma. Recommend further evaluation with CT or MRI with and without contrast to evaluate for a solid lesion. At that time, additional hyperdense bilateral renal smaller lesions could be evaluated. 3. Cholelithiasis. 4. Moderate stool throughout the colon consistent with constipation. 5. Chronic left lung base pleural parenchymal scarring with calcification.  Radiation dose reduction techniques were utilized, including automated exposure control and exposure modulation based on body size.   This report was finalized on 3/6/2024 3:16 PM by Dr. Naseem Mancera M.D on Workstation: [id number]      CT Abdomen Pelvis Without Contrast    Result Date: 3/3/2024  Impression:   1. Increased density surrounding the right kidney compatible with a subcapsular hematoma.  There is no evidence of obstructive uropathy 2. There is a heavy stool burden.  GI tract demonstrates no definite focal acute inflammatory change 3. Cholelithiasis 4. Distal findings as above   Findings discussed with the ordering provider at the time interpretation    SISI VENEGAS MD       Electronically Signed and Approved By: SISI VENEGAS MD on 3/03/2024 at 11:49              Results for orders placed during the hospital encounter of 03/13/24    Duplex Venous Lower Extremity - Right CAR    Interpretation Summary  •  Normal right lower extremity venous duplex scan.      Results for orders placed during the hospital encounter of 03/13/24    Duplex Venous Lower Extremity - Right CAR    Interpretation Summary  •  Normal right lower extremity venous duplex scan.      Results for orders placed during the hospital encounter of 10/29/23    Adult Transthoracic Echo Complete W/ Cont if Necessary Per  Protocol    Interpretation Summary  •  Left ventricular systolic function is normal. Calculated left ventricular EF = 56.8%  •  Left ventricular wall thickness is consistent with mild asymmetric hypertrophy.  •  Left ventricular diastolic function is consistent with (grade I) impaired relaxation.  •  Left atrial volume is moderately increased.  •  Moderate aortic valve stenosis is present.  •  Estimated right ventricular systolic pressure from tricuspid regurgitation is normal (<35 mmHg).  •  Mild dilation of the aortic root is present.      Labs Pending at Discharge:        Discharge Details        Discharge Medications        Changes to Medications        Instructions Start Date   levocetirizine 5 MG tablet  Commonly known as: XYZAL  What changed: when to take this   TAKE 1 TABLET DAILY             Continue These Medications        Instructions Start Date   albuterol sulfate  (90 Base) MCG/ACT inhaler  Commonly known as: PROVENTIL HFA;VENTOLIN HFA;PROAIR HFA   2 puffs, Inhalation, Every 4 Hours PRN      budesonide-formoterol 160-4.5 MCG/ACT inhaler  Commonly known as: SYMBICORT   2 puffs, Inhalation, 2 Times Daily - RT      carvedilol 25 MG tablet  Commonly known as: Coreg   25 mg, Oral, 2 Times Daily With Meals      dexlansoprazole 60 MG capsule  Commonly known as: DEXILANT   60 mg, Oral, Daily      ergocalciferol 1.25 MG (06139 UT) capsule  Commonly known as: ERGOCALCIFEROL   50,000 Units, Oral, Every 14 Days      hydrALAZINE 25 MG tablet  Commonly known as: APRESOLINE   25 mg, Oral, Every 12 Hours Scheduled      linagliptin 5 MG tablet tablet  Commonly known as: Tradjenta   5 mg, Oral, Daily      nitroglycerin 0.4 MG SL tablet  Commonly known as: NITROSTAT   Place 1 tablet under the tongue Every 5 (Five) Minutes As Needed for Chest Pain.      polycarbophil 625 MG tablet tablet   625 mg, Oral, Daily PRN      rosuvastatin 20 MG tablet  Commonly known as: CRESTOR   20 mg, Oral, Daily      sevelamer 800 MG  tablet  Commonly known as: RENVELA   800 mg, Oral, 3 Times Daily With Meals             Stop These Medications      HYDROcodone-acetaminophen 7.5-325 MG per tablet  Commonly known as: NORCO              No Known Allergies      Discharge Disposition:    Home or Self Care    Diet:  Heart healthy renal diet      Discharge Activity:     Activity Instructions       Activity as Tolerated              Future Appointments   Date Time Provider Department Center   5/16/2024  3:15 PM Jazzy Addison APRN [facility] [place] [facility]       Additional Instructions for the Follow-ups that You Need to Schedule       Discharge Follow-up with PCP   As directed       Currently Documented PCP:    Rafael Lyons MD    PCP Phone Number:    [phone]     Follow Up Details: Next week, March 21 @ 1130        Discharge Follow-up with Specified Provider: HD with Dr Garg as schedules   As directed      To: HD with Dr Garg as schedules                Time spent on Discharge including face to face service: 35 minutes.            I have dictated this note utilizing Dragon Dictation.             Please note that portions of this note were completed with a voice recognition program.             Part of this note may be an electronic transcription/translation of spoken language to printed text         using the Dragon Dictation System.       Electronically signed by Rafael Lyons MD, 03/15/24, 1:59 PM EDT.

## 2024-03-15 NOTE — NURSING NOTE
Duration of Treatment 3.5 Hours - Ran for 3.5 hours as ordered   Access Site AVF   Dialyzer Revaclear    mL/min   Dialysate Temperature (C) 36   BFR-As tolerated to a maximum of: 400 mL/min   Prime Dialyzer With NS to Priming Volume? Yes   Dialysate Solution Bath: K+ = 3 mEq, Ca = 2.5mEq   Bicarb Other   Bicarb Comments 38   Na+ 137 meq   Fluid Removal: 3kg as tolerated., Removed 3 liters, tolerated well   Vital signs remained stable through out treatment, removed 3 liters, tolerated well. Report called to BILL [facility]. Awaiting transport.

## 2024-03-15 NOTE — PROGRESS NOTES
[facility]     Nephrology Progress Note      Patient Name: Charlette Rai  : 1935  MRN: [id number]  Primary Care Physician:  Rafael Lyons MD  Date of admission: 3/13/2024    Subjective   Subjective     Interval History:  Patient Reports feeling better this morning, no shortness of breath.  Blood pressure is controlled.  Tolerating p.o.  Right leg pain.  CT scan abdomen pelvis yesterday, noted.  No significant change.      Review of Systems   All systems were reviewed and negative except for: What is mentioned above.    Objective   Objective     Vitals:   Temp:  [97.3 °F (36.3 °C)-98.2 °F (36.8 °C)] 98.1 °F (36.7 °C)  Heart Rate:  [70-88] 70  Resp:  [16-20] 20  BP: (112-161)/(45-63) 148/53  Flow (L/min):  [2] 2  Physical Exam:   Constitutional: Awake, alert   Eyes: sclerae anicteric, no conjunctival injection   HENT: mucous membranes moist   Neck: Supple, no thyromegaly, no lymphadenopathy, trachea midline, mild JVD   Respiratory: Decreased to auscultation bilaterally, nonlabored respirations    Cardiovascular: RRR, no murmurs, rubs, or gallops.   Gastrointestinal: Positive bowel sounds, soft, nontender, nondistended   Musculoskeletal: No edema, no clubbing or cyanosis, left upper extremity AV graft, patent, some bruising noted.   Psychiatric: Appropriate affect, cooperative   Neurologic: Oriented x 3, moving all extremities, Cranial Nerves grossly intact, speech clear   Skin: warm and dry, no rashes, exam unchanged.    Result Review    Result Reviewed:  I have personally reviewed the results from the time of this admission to 3/15/2024 08:35 EDT and agree with these findings:  [x]  Laboratory  []  Microbiology  [x]  Radiology  []  EKG/Telemetry   []  Cardiology/Vascular   []  Pathology  []  Old records  []  Other:        Lab 03/15/24  0427 03/14/24  0724 24  0426 24  0940   SODIUM 134* 140 136 139   POTASSIUM 4.4 4.4 3.9 4.8   CHLORIDE 95* 99 96* 101   CO2 26.6 28.8 27.7 23.6   BUN 46* 31*  31* 52*   CREATININE 3.83* 2.99* 3.00* 4.46*   GLUCOSE 101* 80 77 122*   EGFR 10.9* 14.7* 14.6* 9.1*   ANION GAP 12.4 12.2 12.3 14.4   MAGNESIUM 2.2  --   --   --    PHOSPHORUS 6.0*  --  4.7*  --            Most notable findings include: Labs reviewed.    Assessment & Plan   Assessment / Plan       Active Hospital Problems:  Active Hospital Problems    Diagnosis     **Pulmonary edema     ESRD (end stage renal disease)     Type 2 diabetes mellitus        Assessment and Plan:    - ESRD, dialysis MWF schedule through left upper extremity AVG  at [facility].  Volume status is better.  Dialysis today, UF 3 kg as tolerated.  Check standing scale weight postdialysis.  I asked to be called with value.  Continue 1500 cc p.o. fluid restriction per day.  - Anemia of chronic kidney disease, hemoglobin is not far from  goal.  - Shortness of breath likely related to increased volume, improved.  Chest x-ray is abnormal though improved from previous.  - Recent [facility] admission with spontaneous right perinephric subcapsular hematoma.  CT scan showed no major change from before.    - Left renal lesion will need urology evaluation outpatient.  - Severe hypertension, blood pressure is better.    - Diastolic heart failure, last 2D echo from October showed grade 1 impaired relaxation with hypertrophy moderate aortic stenosis is present.  - Diabetes type 2 with complications.  - Bilateral renal artery stenosis.  - History of lung cancer and COPD.  On O2 per nasal cannula  - Hyperphosphatemia, resumed Renvela with meals.  I added low phosphorus restriction to her diet.  Will follow.    Electronically signed by Edi García MD, 3/15/2024, 08:35 EDT.

## 2024-03-16 ENCOUNTER — READMISSION MANAGEMENT (OUTPATIENT)
Dept: CALL CENTER | Facility: HOSPITAL | Age: 89
End: 2024-03-16
Payer: MEDICARE

## 2024-03-16 NOTE — OUTREACH NOTE
Prep Survey      Flowsheet Row Responses   [facility] facility patient discharged from? [facility]   Is LACE score < 7 ? No   Eligibility Readm Mgmt   Discharge diagnosis Pulmonary edema   Does the patient have one of the following disease processes/diagnoses(primary or secondary)? Other   Does the patient have Home health ordered? No   Is there a DME ordered? No   Medication alerts for this patient see avs   Prep survey completed? Yes            Juliana THOMPSON - Registered Nurse

## 2024-03-19 ENCOUNTER — READMISSION MANAGEMENT (OUTPATIENT)
Dept: CALL CENTER | Facility: HOSPITAL | Age: 89
End: 2024-03-19
Payer: MEDICARE

## 2024-03-19 NOTE — OUTREACH NOTE
Medical Week 1 Survey      Flowsheet Row Responses   [facility] facility patient discharged from? [facility]   Does the patient have one of the following disease processes/diagnoses(primary or secondary)? Other   Week 1 attempt successful? No   Unsuccessful attempts Attempt 3            Vianey BHAKTA - Registered Nurse

## 2024-04-01 ENCOUNTER — HOSPITAL ENCOUNTER (OUTPATIENT)
Facility: HOSPITAL | Age: 89
Setting detail: OBSERVATION
Discharge: HOME | End: 2024-04-03
Attending: EMERGENCY MEDICINE | Admitting: INTERNAL MEDICINE
Payer: MEDICARE

## 2024-04-01 ENCOUNTER — APPOINTMENT (OUTPATIENT)
Dept: GENERAL RADIOLOGY | Facility: HOSPITAL | Age: 89
End: 2024-04-01
Payer: MEDICARE

## 2024-04-01 DIAGNOSIS — N18.6 CHRONIC KIDNEY DISEASE WITH END STAGE RENAL FAILURE ON DIALYSIS: ICD-10-CM

## 2024-04-01 DIAGNOSIS — N18.6 ESRD (END STAGE RENAL DISEASE): ICD-10-CM

## 2024-04-01 DIAGNOSIS — J44.1 ACUTE EXACERBATION OF CHRONIC OBSTRUCTIVE PULMONARY DISEASE (COPD): Primary | ICD-10-CM

## 2024-04-01 DIAGNOSIS — Z99.2 CHRONIC KIDNEY DISEASE WITH END STAGE RENAL FAILURE ON DIALYSIS: ICD-10-CM

## 2024-04-01 PROBLEM — E87.70 VOLUME OVERLOAD: Status: ACTIVE | Noted: 2024-04-01

## 2024-04-01 LAB
ALBUMIN SERPL-MCNC: 3.5 G/DL (ref 3.5–5.2)
ALBUMIN/GLOB SERPL: 1.3 G/DL
ALP SERPL-CCNC: 186 U/L (ref 39–117)
ALT SERPL W P-5'-P-CCNC: 15 U/L (ref 1–33)
ANION GAP SERPL CALCULATED.3IONS-SCNC: 10.5 MMOL/L (ref 5–15)
AST SERPL-CCNC: 18 U/L (ref 1–32)
BASE EXCESS BLDA CALC-SCNC: -0.8 MMOL/L (ref -2–2)
BASOPHILS # BLD AUTO: 0.05 10*3/MM3 (ref 0–0.2)
BASOPHILS NFR BLD AUTO: 0.5 % (ref 0–1.5)
BDY SITE: ABNORMAL
BILIRUB SERPL-MCNC: 0.3 MG/DL (ref 0–1.2)
BUN SERPL-MCNC: 52 MG/DL (ref 8–23)
BUN/CREAT SERPL: 11 (ref 7–25)
CALCIUM SPEC-SCNC: 8 MG/DL (ref 8.6–10.5)
CHLORIDE SERPL-SCNC: 99 MMOL/L (ref 98–107)
CO2 SERPL-SCNC: 27.5 MMOL/L (ref 22–29)
COHGB MFR BLD: 0.7 % (ref 0–1.5)
CREAT SERPL-MCNC: 4.71 MG/DL (ref 0.57–1)
DEPRECATED RDW RBC AUTO: 53.8 FL (ref 37–54)
EGFRCR SERPLBLD CKD-EPI 2021: 8.5 ML/MIN/1.73
EOSINOPHIL # BLD AUTO: 0.19 10*3/MM3 (ref 0–0.4)
EOSINOPHIL NFR BLD AUTO: 1.9 % (ref 0.3–6.2)
ERYTHROCYTE [DISTWIDTH] IN BLOOD BY AUTOMATED COUNT: 14.6 % (ref 12.3–15.4)
FHHB: 6.1 % (ref 0–5)
FLUAV SUBTYP SPEC NAA+PROBE: NOT DETECTED
FLUBV RNA ISLT QL NAA+PROBE: NOT DETECTED
GAS FLOW AIRWAY: 2 LPM
GEN 5 2HR TROPONIN T REFLEX: 67 NG/L
GLOBULIN UR ELPH-MCNC: 2.7 GM/DL
GLUCOSE SERPL-MCNC: 127 MG/DL (ref 65–99)
HCO3 BLDA-SCNC: 24.4 MMOL/L (ref 22–26)
HCT VFR BLD AUTO: 30.8 % (ref 34–46.6)
HGB BLD-MCNC: 9.1 G/DL (ref 12–15.9)
HGB BLDA-MCNC: 10.6 G/DL (ref 11.7–14.6)
HOLD SPECIMEN: NORMAL
HOLD SPECIMEN: NORMAL
IMM GRANULOCYTES # BLD AUTO: 0.03 10*3/MM3 (ref 0–0.05)
IMM GRANULOCYTES NFR BLD AUTO: 0.3 % (ref 0–0.5)
LYMPHOCYTES # BLD AUTO: 0.8 10*3/MM3 (ref 0.7–3.1)
LYMPHOCYTES NFR BLD AUTO: 8 % (ref 19.6–45.3)
MCH RBC QN AUTO: 29.7 PG (ref 26.6–33)
MCHC RBC AUTO-ENTMCNC: 29.5 G/DL (ref 31.5–35.7)
MCV RBC AUTO: 100.7 FL (ref 79–97)
METHGB BLD QL: 0.3 % (ref 0–1.5)
MODALITY: ABNORMAL
MONOCYTES # BLD AUTO: 1 10*3/MM3 (ref 0.1–0.9)
MONOCYTES NFR BLD AUTO: 10 % (ref 5–12)
NEUTROPHILS NFR BLD AUTO: 7.93 10*3/MM3 (ref 1.7–7)
NEUTROPHILS NFR BLD AUTO: 79.3 % (ref 42.7–76)
NRBC BLD AUTO-RTO: 0 /100 WBC (ref 0–0.2)
NT-PROBNP SERPL-MCNC: ABNORMAL PG/ML (ref 0–1800)
OXYHGB MFR BLDV: 92.9 % (ref 94–99)
PCO2 BLDA: 42.8 MM HG (ref 35–45)
PH BLDA: 7.37 PH UNITS (ref 7.35–7.45)
PLATELET # BLD AUTO: 187 10*3/MM3 (ref 140–450)
PMV BLD AUTO: 9.4 FL (ref 6–12)
PO2 BLDA: 71.3 MM HG (ref 80–100)
POTASSIUM SERPL-SCNC: 5.3 MMOL/L (ref 3.5–5.2)
PROT SERPL-MCNC: 6.2 G/DL (ref 6–8.5)
QT INTERVAL: 467 MS
QTC INTERVAL: 506 MS
RBC # BLD AUTO: 3.06 10*6/MM3 (ref 3.77–5.28)
RSV RNA NPH QL NAA+NON-PROBE: NOT DETECTED
SAO2 % BLDCOA: 93.8 % (ref 95–99)
SARS-COV-2 RNA RESP QL NAA+PROBE: NOT DETECTED
SODIUM SERPL-SCNC: 137 MMOL/L (ref 136–145)
TROPONIN T DELTA: 0 NG/L
TROPONIN T SERPL HS-MCNC: 67 NG/L
WBC NRBC COR # BLD AUTO: 10 10*3/MM3 (ref 3.4–10.8)
WHOLE BLOOD HOLD COAG: NORMAL
WHOLE BLOOD HOLD SPECIMEN: NORMAL

## 2024-04-01 PROCEDURE — 94799 UNLISTED PULMONARY SVC/PX: CPT

## 2024-04-01 PROCEDURE — 82805 BLOOD GASES W/O2 SATURATION: CPT | Performed by: EMERGENCY MEDICINE

## 2024-04-01 PROCEDURE — 36415 COLL VENOUS BLD VENIPUNCTURE: CPT

## 2024-04-01 PROCEDURE — G0378 HOSPITAL OBSERVATION PER HR: HCPCS

## 2024-04-01 PROCEDURE — 93005 ELECTROCARDIOGRAM TRACING: CPT

## 2024-04-01 PROCEDURE — 82375 ASSAY CARBOXYHB QUANT: CPT | Performed by: EMERGENCY MEDICINE

## 2024-04-01 PROCEDURE — 25010000002 HYDROMORPHONE 1 MG/ML SOLUTION: Performed by: INTERNAL MEDICINE

## 2024-04-01 PROCEDURE — 84484 ASSAY OF TROPONIN QUANT: CPT | Performed by: EMERGENCY MEDICINE

## 2024-04-01 PROCEDURE — 99285 EMERGENCY DEPT VISIT HI MDM: CPT

## 2024-04-01 PROCEDURE — 85025 COMPLETE CBC W/AUTO DIFF WBC: CPT

## 2024-04-01 PROCEDURE — 96375 TX/PRO/DX INJ NEW DRUG ADDON: CPT

## 2024-04-01 PROCEDURE — 87637 SARSCOV2&INF A&B&RSV AMP PRB: CPT | Performed by: EMERGENCY MEDICINE

## 2024-04-01 PROCEDURE — 94640 AIRWAY INHALATION TREATMENT: CPT

## 2024-04-01 PROCEDURE — 96374 THER/PROPH/DIAG INJ IV PUSH: CPT

## 2024-04-01 PROCEDURE — 36600 WITHDRAWAL OF ARTERIAL BLOOD: CPT | Performed by: EMERGENCY MEDICINE

## 2024-04-01 PROCEDURE — 25010000002 METHYLPREDNISOLONE PER 40 MG: Performed by: INTERNAL MEDICINE

## 2024-04-01 PROCEDURE — 25010000002 ONDANSETRON PER 1 MG: Performed by: EMERGENCY MEDICINE

## 2024-04-01 PROCEDURE — 93010 ELECTROCARDIOGRAM REPORT: CPT | Performed by: INTERNAL MEDICINE

## 2024-04-01 PROCEDURE — 80053 COMPREHEN METABOLIC PANEL: CPT

## 2024-04-01 PROCEDURE — 71045 X-RAY EXAM CHEST 1 VIEW: CPT

## 2024-04-01 PROCEDURE — 93005 ELECTROCARDIOGRAM TRACING: CPT | Performed by: EMERGENCY MEDICINE

## 2024-04-01 PROCEDURE — 94761 N-INVAS EAR/PLS OXIMETRY MLT: CPT

## 2024-04-01 PROCEDURE — 84484 ASSAY OF TROPONIN QUANT: CPT

## 2024-04-01 PROCEDURE — 83880 ASSAY OF NATRIURETIC PEPTIDE: CPT

## 2024-04-01 PROCEDURE — 94664 DEMO&/EVAL PT USE INHALER: CPT

## 2024-04-01 PROCEDURE — 83050 HGB METHEMOGLOBIN QUAN: CPT | Performed by: EMERGENCY MEDICINE

## 2024-04-01 RX ORDER — SEVELAMER CARBONATE 800 MG/1
800 TABLET, FILM COATED ORAL
Status: DISCONTINUED | OUTPATIENT
Start: 2024-04-01 | End: 2024-04-03 | Stop reason: HOSPADM

## 2024-04-01 RX ORDER — METHYLPREDNISOLONE SODIUM SUCCINATE 40 MG/ML
40 INJECTION, POWDER, LYOPHILIZED, FOR SOLUTION INTRAMUSCULAR; INTRAVENOUS EVERY 8 HOURS
Status: DISCONTINUED | OUTPATIENT
Start: 2024-04-01 | End: 2024-04-03 | Stop reason: HOSPADM

## 2024-04-01 RX ORDER — ONDANSETRON 2 MG/ML
4 INJECTION INTRAMUSCULAR; INTRAVENOUS ONCE
Status: COMPLETED | OUTPATIENT
Start: 2024-04-01 | End: 2024-04-01

## 2024-04-01 RX ORDER — ROSUVASTATIN CALCIUM 20 MG/1
20 TABLET, COATED ORAL NIGHTLY
Status: DISCONTINUED | OUTPATIENT
Start: 2024-04-01 | End: 2024-04-03 | Stop reason: HOSPADM

## 2024-04-01 RX ORDER — AMOXICILLIN 250 MG
2 CAPSULE ORAL 2 TIMES DAILY PRN
Status: DISCONTINUED | OUTPATIENT
Start: 2024-04-01 | End: 2024-04-03 | Stop reason: HOSPADM

## 2024-04-01 RX ORDER — BISACODYL 10 MG
10 SUPPOSITORY, RECTAL RECTAL DAILY PRN
Status: DISCONTINUED | OUTPATIENT
Start: 2024-04-01 | End: 2024-04-03 | Stop reason: HOSPADM

## 2024-04-01 RX ORDER — ALUMINA, MAGNESIA, AND SIMETHICONE 2400; 2400; 240 MG/30ML; MG/30ML; MG/30ML
15 SUSPENSION ORAL EVERY 6 HOURS PRN
Status: DISCONTINUED | OUTPATIENT
Start: 2024-04-01 | End: 2024-04-03 | Stop reason: HOSPADM

## 2024-04-01 RX ORDER — HYDRALAZINE HYDROCHLORIDE 50 MG/1
25 TABLET, FILM COATED ORAL EVERY 12 HOURS SCHEDULED
Status: DISCONTINUED | OUTPATIENT
Start: 2024-04-01 | End: 2024-04-03 | Stop reason: HOSPADM

## 2024-04-01 RX ORDER — FAMOTIDINE 20 MG/1
10 TABLET, FILM COATED ORAL DAILY
Status: DISCONTINUED | OUTPATIENT
Start: 2024-04-01 | End: 2024-04-03 | Stop reason: HOSPADM

## 2024-04-01 RX ORDER — BISACODYL 5 MG/1
5 TABLET, DELAYED RELEASE ORAL DAILY PRN
Status: DISCONTINUED | OUTPATIENT
Start: 2024-04-01 | End: 2024-04-03 | Stop reason: HOSPADM

## 2024-04-01 RX ORDER — CARVEDILOL 25 MG/1
25 TABLET ORAL 2 TIMES DAILY WITH MEALS
Status: DISCONTINUED | OUTPATIENT
Start: 2024-04-01 | End: 2024-04-03 | Stop reason: HOSPADM

## 2024-04-01 RX ORDER — ONDANSETRON 2 MG/ML
4 INJECTION INTRAMUSCULAR; INTRAVENOUS EVERY 4 HOURS PRN
Status: DISCONTINUED | OUTPATIENT
Start: 2024-04-01 | End: 2024-04-03 | Stop reason: HOSPADM

## 2024-04-01 RX ORDER — HYDROCODONE BITARTRATE AND ACETAMINOPHEN 5; 325 MG/1; MG/1
1 TABLET ORAL EVERY 4 HOURS PRN
Status: DISCONTINUED | OUTPATIENT
Start: 2024-04-01 | End: 2024-04-03 | Stop reason: HOSPADM

## 2024-04-01 RX ORDER — SODIUM CHLORIDE 9 MG/ML
40 INJECTION, SOLUTION INTRAVENOUS AS NEEDED
Status: DISCONTINUED | OUTPATIENT
Start: 2024-04-01 | End: 2024-04-03 | Stop reason: HOSPADM

## 2024-04-01 RX ORDER — NALOXONE HCL 0.4 MG/ML
0.4 VIAL (ML) INJECTION
Status: DISCONTINUED | OUTPATIENT
Start: 2024-04-01 | End: 2024-04-03 | Stop reason: HOSPADM

## 2024-04-01 RX ORDER — ALPRAZOLAM 0.25 MG/1
0.25 TABLET ORAL EVERY 6 HOURS PRN
Status: DISCONTINUED | OUTPATIENT
Start: 2024-04-01 | End: 2024-04-03 | Stop reason: HOSPADM

## 2024-04-01 RX ORDER — IPRATROPIUM BROMIDE AND ALBUTEROL SULFATE 2.5; .5 MG/3ML; MG/3ML
3 SOLUTION RESPIRATORY (INHALATION)
Status: DISCONTINUED | OUTPATIENT
Start: 2024-04-01 | End: 2024-04-03 | Stop reason: HOSPADM

## 2024-04-01 RX ORDER — SODIUM CHLORIDE 0.9 % (FLUSH) 0.9 %
10 SYRINGE (ML) INJECTION AS NEEDED
Status: DISCONTINUED | OUTPATIENT
Start: 2024-04-01 | End: 2024-04-03 | Stop reason: HOSPADM

## 2024-04-01 RX ORDER — FAMOTIDINE 20 MG/1
40 TABLET, FILM COATED ORAL DAILY
Status: DISCONTINUED | OUTPATIENT
Start: 2024-04-01 | End: 2024-04-01 | Stop reason: DRUGHIGH

## 2024-04-01 RX ORDER — IPRATROPIUM BROMIDE AND ALBUTEROL SULFATE 2.5; .5 MG/3ML; MG/3ML
3 SOLUTION RESPIRATORY (INHALATION) ONCE
Status: COMPLETED | OUTPATIENT
Start: 2024-04-01 | End: 2024-04-01

## 2024-04-01 RX ORDER — TEMAZEPAM 15 MG/1
15 CAPSULE ORAL NIGHTLY PRN
Status: DISCONTINUED | OUTPATIENT
Start: 2024-04-01 | End: 2024-04-03 | Stop reason: HOSPADM

## 2024-04-01 RX ORDER — POLYETHYLENE GLYCOL 3350 17 G/17G
17 POWDER, FOR SOLUTION ORAL DAILY PRN
Status: DISCONTINUED | OUTPATIENT
Start: 2024-04-01 | End: 2024-04-03 | Stop reason: HOSPADM

## 2024-04-01 RX ORDER — SODIUM CHLORIDE 0.9 % (FLUSH) 0.9 %
10 SYRINGE (ML) INJECTION EVERY 12 HOURS SCHEDULED
Status: DISCONTINUED | OUTPATIENT
Start: 2024-04-01 | End: 2024-04-03 | Stop reason: HOSPADM

## 2024-04-01 RX ORDER — ACETAMINOPHEN 325 MG/1
650 TABLET ORAL EVERY 4 HOURS PRN
Status: DISCONTINUED | OUTPATIENT
Start: 2024-04-01 | End: 2024-04-03 | Stop reason: HOSPADM

## 2024-04-01 RX ADMIN — IPRATROPIUM BROMIDE AND ALBUTEROL SULFATE 3 ML: .5; 3 SOLUTION RESPIRATORY (INHALATION) at 23:37

## 2024-04-01 RX ADMIN — TEMAZEPAM 15 MG: 15 CAPSULE ORAL at 21:55

## 2024-04-01 RX ADMIN — HYDROMORPHONE HYDROCHLORIDE 0.5 MG: 1 INJECTION, SOLUTION INTRAMUSCULAR; INTRAVENOUS; SUBCUTANEOUS at 21:55

## 2024-04-01 RX ADMIN — IPRATROPIUM BROMIDE AND ALBUTEROL SULFATE 3 ML: .5; 3 SOLUTION RESPIRATORY (INHALATION) at 13:16

## 2024-04-01 RX ADMIN — METHYLPREDNISOLONE SODIUM SUCCINATE 40 MG: 40 INJECTION INTRAMUSCULAR; INTRAVENOUS at 18:39

## 2024-04-01 RX ADMIN — HYDRALAZINE HYDROCHLORIDE 25 MG: 50 TABLET, FILM COATED ORAL at 20:00

## 2024-04-01 RX ADMIN — FAMOTIDINE 10 MG: 20 TABLET ORAL at 18:39

## 2024-04-01 RX ADMIN — ROSUVASTATIN CALCIUM 20 MG: 20 TABLET, FILM COATED ORAL at 20:00

## 2024-04-01 RX ADMIN — ONDANSETRON 4 MG: 2 INJECTION INTRAMUSCULAR; INTRAVENOUS at 11:28

## 2024-04-01 RX ADMIN — CARVEDILOL 25 MG: 25 TABLET, FILM COATED ORAL at 18:39

## 2024-04-01 RX ADMIN — LINAGLIPTIN 5 MG: 5 TABLET, FILM COATED ORAL at 18:39

## 2024-04-01 RX ADMIN — Medication 10 ML: at 20:01

## 2024-04-01 RX ADMIN — IPRATROPIUM BROMIDE AND ALBUTEROL SULFATE 3 ML: .5; 3 SOLUTION RESPIRATORY (INHALATION) at 21:02

## 2024-04-01 RX ADMIN — SEVELAMER CARBONATE 800 MG: 800 TABLET, FILM COATED ORAL at 20:00

## 2024-04-01 NOTE — H&P
[facility]   HISTORY AND PHYSICAL    Patient Name: Charlette Rai  : 1935  MRN: [id number]  Primary Care Physician:  Rafael Lyons MD  Date of admission: 2024    Subjective   Subjective     Chief Complaint:   Shortness of breath      HPI:    Charlette Rai is a 89 y.o. female well-known to me with history of COPD and end-stage renal disease, came to emergency room with increasing shortness of breath since early this morning.  Patient was due for dialysis today she did not go for dialysis.  Her fistula is causing problems and getting clotted.  It was declotted last week on Tuesday and she had dialysis on Wednesday and Friday.  She reports increasing shortness of breath for last 24 hours, coughing and wheezing        Review of Systems:    No fever chills.  Shortness of breath.  Wheezing.  Tired fatigue.  Decreased appetite    Personal History     Past Medical History:   Diagnosis Date    Allergic rhinitis     Anemia     Arthritis     Asthma     USE INHALERS AND NEBULIZERS    Back pain     Bladder disorder     Cancer     LEFT LUNG CANCER  SURGERY AND CHEMO DONE  AND CURRENTLY   RIGHT LUNG CANCER HAS ONLY RECEIVED RADIATION THUS FAR    Chronic kidney disease     stage 3    CKD (chronic kidney disease) stage 4, GFR 15-29 ml/min     Condition not found     ulcer    Congestive heart failure (CHF)     FOLLOWED BY DR AQUINO. DENIES CP BUT DOES HAVE SOA CHRONIC ISSUE COPD/LUNG CANCER    COPD (chronic obstructive pulmonary disease)     FOLLOWED BY DR MARIAJOSE BAILEY    Coronary artery disease     DENIES CP BUT DOES GET SOA MOST OF THE TIME WITH EXERTION BUT OCC AT REST CHRONIC ISSUE COPD/LUNG CANCER    Deep vein thrombosis     Diabetes mellitus     DOES NOT CHECK BS DAILY    Disease of thyroid gland     HYPOTHYROIDISM    Essential hypertension     Gastric ulcer     GERD (gastroesophageal reflux disease)     Heart murmur     History of transfusion     NO ISSUES POST TRANSFUSION WAS MANY YEARS AGO     Hyperlipemia     Leukocytopenia     FOLLOWED BY DR MIR BAILEY    Limb swelling     Lumbago     Lumbar spinal stenosis     Lung cancer     Migraine headache     Multiple joint pain     On home oxygen therapy     3L/NC PRN    Osteopenia     Reflux esophagitis     Shortness of breath     Thyroid nodule     HAS ULTRASOUND YEARLY BEING MONITORED    Vascular disease     Vitamin D deficiency        Past Surgical History:   Procedure Laterality Date    ABDOMINAL SURGERY      APPENDECTOMY      ARTERIOVENOUS FISTULA/SHUNT SURGERY Left 05/10/2022    Procedure: Left basilic vein transposition;  Surgeon: Wan Barksdale MD;  Location: [facility] MAIN OR;  Service: Vascular;  Laterality: Left;    ARTERIOVENOUS FISTULA/SHUNT SURGERY Left 3/23/2023    Procedure: Ligation of left arm arteriovenous fistula;  Surgeon: Wan Barksdale MD;  Location: [facility] MAIN OR;  Service: Vascular;  Laterality: Left;    ARTERIOVENOUS FISTULA/SHUNT SURGERY Left 11/30/2023    Procedure: Creation of left arm arteriovenous graft;  Surgeon: Wan Barksdale MD;  Location: [facility] OR;  Service: Vascular;  Laterality: Left;    CARDIAC CATHETERIZATION  1996    CARDIAC SURGERY      CARDIAC SURGERY      fluid drained from heart    CATARACT EXTRACTION, BILATERAL  2003    COLONOSCOPY  2014    ENDOSCOPY  2016 2019    FEMORAL ARTERY STENT Bilateral     HYSTERECTOMY      LUNG BIOPSY Left 2005    lobectomy upper lung caner    LUNG VOLUME REDUCTION      OTHER SURGICAL HISTORY      artifical joints/limbs    REPLACEMENT TOTAL KNEE Left 2016    UPPER GASTROINTESTINAL ENDOSCOPY         Family History: family history includes Arthritis in her father and mother; Cancer in her brother, brother, brother, and brother; Diabetes in her brother; Other in her brother; Prostate cancer in her brother and brother. Otherwise pertinent FHx was reviewed and not pertinent to current issue.    Social History:  reports that she quit smoking about 19 years ago. Her smoking use included  cigarettes. She started smoking about 72 years ago. She has a 52.5 pack-year smoking history. She has been exposed to tobacco smoke. She has never used smokeless tobacco. She reports that she does not drink alcohol and does not use drugs.    Home Medications:  albuterol sulfate HFA, budesonide-formoterol, carvedilol, dexlansoprazole, ergocalciferol, hydrALAZINE, levocetirizine, linagliptin, nitroglycerin, polycarbophil, rosuvastatin, and sevelamer      Allergies:  No Known Allergies    Objective   Objective     Vitals:   Temp:  [98.1 °F (36.7 °C)] 98.1 °F (36.7 °C)  Heart Rate:  [73-76] 74  Resp:  [15-16] 16  BP: (154-177)/(47-51) 176/50  Flow (L/min):  [2] 2    Physical Exam    Anyi female looks of her stated age, in mild respiratory distress.  Neck supple using accessory muscles.  Trachea central.  Heart regular.  Lungs diffuse rhonchi.  Some crackles at the bases.  Abdomen soft.  Extremities without any edema      I have personally reviewed the results from the time of this admission to 4/1/2024 16:55 EDT and agree with these findings:  [x]  Laboratory  [x]  Microbiology  [x]  Radiology  []  EKG/Telemetry   []  Cardiology/Vascular   []  Pathology  []  Old records  []  Other:    CBC:    WBC   Date Value Ref Range Status   04/01/2024 10.00 3.40 - 10.80 10*3/mm3 Final     RBC   Date Value Ref Range Status   04/01/2024 3.06 (L) 3.77 - 5.28 10*6/mm3 Final     Hemoglobin   Date Value Ref Range Status   04/01/2024 9.1 (L) 12.0 - 15.9 g/dL Final     Hematocrit   Date Value Ref Range Status   04/01/2024 30.8 (L) 34.0 - 46.6 % Final     MCV   Date Value Ref Range Status   04/01/2024 100.7 (H) 79.0 - 97.0 fL Final     MCH   Date Value Ref Range Status   04/01/2024 29.7 26.6 - 33.0 pg Final     MCHC   Date Value Ref Range Status   04/01/2024 29.5 (L) 31.5 - 35.7 g/dL Final     RDW   Date Value Ref Range Status   04/01/2024 14.6 12.3 - 15.4 % Final     RDW-SD   Date Value Ref Range Status   04/01/2024 53.8 37.0 - 54.0 fl  Final     MPV   Date Value Ref Range Status   04/01/2024 9.4 6.0 - 12.0 fL Final     Platelets   Date Value Ref Range Status   04/01/2024 187 140 - 450 10*3/mm3 Final     Neutrophil %   Date Value Ref Range Status   04/01/2024 79.3 (H) 42.7 - 76.0 % Final     Lymphocyte %   Date Value Ref Range Status   04/01/2024 8.0 (L) 19.6 - 45.3 % Final     Monocyte %   Date Value Ref Range Status   04/01/2024 10.0 5.0 - 12.0 % Final     Eosinophil %   Date Value Ref Range Status   04/01/2024 1.9 0.3 - 6.2 % Final     Basophil %   Date Value Ref Range Status   04/01/2024 0.5 0.0 - 1.5 % Final     Immature Grans %   Date Value Ref Range Status   04/01/2024 0.3 0.0 - 0.5 % Final     Neutrophils, Absolute   Date Value Ref Range Status   04/01/2024 7.93 (H) 1.70 - 7.00 10*3/mm3 Final     Lymphocytes, Absolute   Date Value Ref Range Status   04/01/2024 0.80 0.70 - 3.10 10*3/mm3 Final     Monocytes, Absolute   Date Value Ref Range Status   04/01/2024 1.00 (H) 0.10 - 0.90 10*3/mm3 Final     Eosinophils, Absolute   Date Value Ref Range Status   04/01/2024 0.19 0.00 - 0.40 10*3/mm3 Final     Basophils, Absolute   Date Value Ref Range Status   04/01/2024 0.05 0.00 - 0.20 10*3/mm3 Final     Immature Grans, Absolute   Date Value Ref Range Status   04/01/2024 0.03 0.00 - 0.05 10*3/mm3 Final     nRBC   Date Value Ref Range Status   04/01/2024 0.0 0.0 - 0.2 /100 WBC Final        BMP:    Lab Results   Component Value Date    GLUCOSE 127 (H) 04/01/2024    BUN 52 (H) 04/01/2024    CREATININE 4.71 (H) 04/01/2024    EGFRIFNONA 17 (L) 01/10/2022    BCR 11.0 04/01/2024    K 5.3 (H) 04/01/2024    CO2 27.5 04/01/2024    CALCIUM 8.0 (L) 04/01/2024    ALBUMIN 3.5 04/01/2024    LABIL2 1.5 05/05/2021    AST 18 04/01/2024    ALT 15 04/01/2024        XR Chest 1 View    Result Date: 4/1/2024  IMPRESSION :  1. Mild prominence of the interstitial markings again noted slightly increased in the comparison. Findings represent underlying interstitial edema given  findings on recent CT. Superimposed pneumonia is not excluded. [  Electronically Signed By-Eleazar Burris On:4/1/2024 9:20 AM              Assessment & Plan   Assessment / Plan       Current Diagnosis:  Active Hospital Problems    Diagnosis     COPD exacerbation     Volume overload     ESRD (end stage renal disease)      Plan:   Admit to hospital, patient missed dialysis today, nephrologist on-call was notified by ER.  Will give Lasix x 1.  For COPD will start on DuoNebs.  IV steroids.  Restart essential home meds        DVT prophylaxis:  Mechanical DVT prophylaxis orders are signed and held.          GI Prophylaxis:       Pepcid    CODE STATUS:    Code Status (Patient has no pulse and is not breathing): CPR (Attempt to Resuscitate)  Medical Interventions (Patient has pulse or is breathing): Full Support    Admission Status:  I believe this patient meets observation status.             I have dictated this note utilizing Dragon Dictation.             Please note that portions of this note were completed with a voice recognition program.             Part of this note may be an electronic transcription/translation of spoken language to printed text         using the Dragon Dictation System.       Electronically signed by Rafael Lyons MD, 04/01/24, 4:53 PM EDT.    Total time spent with in evaluation and management:

## 2024-04-01 NOTE — ED PROVIDER NOTES
Time: 11:18 AM EDT  Date of encounter:  4/1/2024  Independent Historian/Clinical History and Information was obtained by:   Patient    History is limited by: N/A    Chief Complaint: Short of breath      History of Present Illness:  Patient is a 89 y.o. year old female who presents to the emergency department for evaluation of shortness of breath    HPI    Patient Care Team  Primary Care Provider: Rafael Lyons MD    Past Medical History:     No Known Allergies  Past Medical History:   Diagnosis Date    Allergic rhinitis     Anemia     Arthritis     Asthma     USE INHALERS AND NEBULIZERS    Back pain     Bladder disorder     Cancer     LEFT LUNG CANCER 2005 SURGERY AND CHEMO DONE  AND CURRENTLY 4/ 14/22  RIGHT LUNG CANCER HAS ONLY RECEIVED RADIATION THUS FAR    Chronic kidney disease     stage 3    CKD (chronic kidney disease) stage 4, GFR 15-29 ml/min     Condition not found     ulcer    Congestive heart failure (CHF)     FOLLOWED BY DR AQUINO. DENIES CP BUT DOES HAVE SOA CHRONIC ISSUE COPD/LUNG CANCER    COPD (chronic obstructive pulmonary disease)     FOLLOWED BY DR MARIAJOSE BAILEY    Coronary artery disease     DENIES CP BUT DOES GET SOA MOST OF THE TIME WITH EXERTION BUT OCC AT REST CHRONIC ISSUE COPD/LUNG CANCER    Deep vein thrombosis     Diabetes mellitus     DOES NOT CHECK BS DAILY    Disease of thyroid gland     HYPOTHYROIDISM    Essential hypertension     Gastric ulcer     GERD (gastroesophageal reflux disease)     Heart murmur     History of transfusion     NO ISSUES POST TRANSFUSION WAS MANY YEARS AGO    Hyperlipemia     Leukocytopenia     FOLLOWED BY DR MIR BAILEY    Limb swelling     Lumbago     Lumbar spinal stenosis     Lung cancer     Migraine headache     Multiple joint pain     On home oxygen therapy     3L/NC PRN    Osteopenia     Reflux esophagitis     Shortness of breath     Thyroid nodule     HAS ULTRASOUND YEARLY BEING MONITORED    Vascular disease     Vitamin D deficiency      Past Surgical History:    Procedure Laterality Date    ABDOMINAL SURGERY      APPENDECTOMY      ARTERIOVENOUS FISTULA/SHUNT SURGERY Left 05/10/2022    Procedure: Left basilic vein transposition;  Surgeon: Wan Barksdale MD;  Location: [facility] MAIN OR;  Service: Vascular;  Laterality: Left;    ARTERIOVENOUS FISTULA/SHUNT SURGERY Left 3/23/2023    Procedure: Ligation of left arm arteriovenous fistula;  Surgeon: Wan Barksdale MD;  Location: [facility] MAIN OR;  Service: Vascular;  Laterality: Left;    ARTERIOVENOUS FISTULA/SHUNT SURGERY Left 11/30/2023    Procedure: Creation of left arm arteriovenous graft;  Surgeon: Wan Barksdale MD;  Location: [facility] MAIN OR;  Service: Vascular;  Laterality: Left;    CARDIAC CATHETERIZATION  1996    CARDIAC SURGERY      CARDIAC SURGERY      fluid drained from heart    CATARACT EXTRACTION, BILATERAL  2003    COLONOSCOPY  2014    ENDOSCOPY  2016 2019    FEMORAL ARTERY STENT Bilateral     HYSTERECTOMY      LUNG BIOPSY Left 2005    lobectomy upper lung caner    LUNG VOLUME REDUCTION      OTHER SURGICAL HISTORY      artifical joints/limbs    REPLACEMENT TOTAL KNEE Left 2016    UPPER GASTROINTESTINAL ENDOSCOPY       Family History   Problem Relation Age of Onset    Arthritis Mother     Arthritis Father     Cancer Brother     Diabetes Brother     Other Brother         blood disease     Prostate cancer Brother     Cancer Brother     Prostate cancer Brother     Cancer Brother     Cancer Brother     Malig Hyperthermia Neg Hx     Colon cancer Neg Hx        Home Medications:  Prior to Admission medications    Medication Sig Start Date End Date Taking? Authorizing Provider   albuterol sulfate  (90 Base) MCG/ACT inhaler Inhale 2 puffs Every 4 (Four) Hours As Needed for Wheezing.    ProviderSarah MD   budesonide-formoterol (SYMBICORT) 160-4.5 MCG/ACT inhaler Inhale 2 puffs 2 (Two) Times a Day.    ProviderSarah MD   carvedilol (Coreg) 25 MG tablet Take 1 tablet by mouth 2 (Two) Times a Day With  Meals for 30 days. 11/5/23 3/13/24  Patrick Turcios MD   dexlansoprazole (DEXILANT) 60 MG capsule Take 1 capsule by mouth Daily. 22   Sarah Parker MD   ergocalciferol (ERGOCALCIFEROL) 1.25 MG (96300 UT) capsule Take 1 capsule by mouth Every 14 (Fourteen) Days. 3/4/22   Emergency, Nurse Emy, RN   hydrALAZINE (APRESOLINE) 25 MG tablet Take 1 tablet by mouth Every 12 (Twelve) Hours. 3/9/24   Emily Chandra MD   levocetirizine (XYZAL) 5 MG tablet TAKE 1 TABLET DAILY  Patient taking differently: Take 1 tablet by mouth Every Evening. 23   Paloma James MD   linagliptin (Tradjenta) 5 MG tablet tablet Take 1 tablet by mouth Daily. 1/10/22   Paloma James MD   nitroglycerin (NITROSTAT) 0.4 MG SL tablet Place 1 tablet under the tongue Every 5 (Five) Minutes As Needed for Chest Pain.    Sarah Parker MD   polycarbophil (calcium polycarbophil) 625 MG tablet tablet Take 1 tablet by mouth Daily As Needed.    Sarah Parker MD   rosuvastatin (CRESTOR) 20 MG tablet Take 1 tablet by mouth Daily. 23   León Sanchez MD   sevelamer (RENVELA) 800 MG tablet Take 1 tablet by mouth 3 (Three) Times a Day With Meals. 10/9/23   Sarah Parker MD        Social History:   Social History     Tobacco Use    Smoking status: Former     Current packs/day: 0.00     Average packs/day: 1 pack/day for 52.5 years (52.5 ttl pk-yrs)     Types: Cigarettes     Start date:      Quit date: 2004     Years since quittin.8     Passive exposure: Past    Smokeless tobacco: Never   Vaping Use    Vaping status: Never Used   Substance Use Topics    Alcohol use: Never    Drug use: Never         Review of Systems:  Review of Systems   Respiratory:  Positive for shortness of breath.         Physical Exam:  /54 (BP Location: Right arm, Patient Position: Lying)   Pulse 70   Temp 97.7 °F (36.5 °C) (Oral)   Resp 18   Wt 67.5 kg (148 lb 13 oz)   LMP  (LMP Unknown)   SpO2  98%   BMI 24.76 kg/m²     Physical Exam  Vitals and nursing note reviewed.   Constitutional:       General: She is not in acute distress.  Cardiovascular:      Rate and Rhythm: Normal rate and regular rhythm.      Heart sounds: Normal heart sounds.   Pulmonary:      Effort: Pulmonary effort is normal. No respiratory distress.      Breath sounds: Examination of the right-upper field reveals decreased breath sounds. Examination of the left-upper field reveals decreased breath sounds. Examination of the right-middle field reveals decreased breath sounds. Examination of the left-middle field reveals decreased breath sounds. Examination of the right-lower field reveals decreased breath sounds. Examination of the left-lower field reveals decreased breath sounds. Decreased breath sounds present.   Abdominal:      General: Abdomen is flat.      Palpations: Abdomen is soft.   Musculoskeletal:         General: Normal range of motion.      Cervical back: Normal range of motion.   Skin:     General: Skin is warm and dry.   Neurological:      Mental Status: She is alert and oriented to person, place, and time. Mental status is at baseline.                  Procedures:  Procedures      Medical Decision Making:      Comorbidities that affect care:    Lung cancer, COPD, diabetes    External Notes reviewed:    Previous Admission Note: Patient admitted 3/13/2024 for congestive heart failure      The following orders were placed and all results were independently analyzed by me:  Orders Placed This Encounter   Procedures    COVID-19, FLU A/B, RSV PCR 1 HR TAT - Swab, Nasopharynx    Respiratory Culture - Sputum, Cough    XR Chest 1 View    [place] Draw    Comprehensive Metabolic Panel    BNP    Single High Sensitivity Troponin T    CBC Auto Differential    High Sensitivity Troponin T 2Hr    Blood Gas, Arterial -With Co-Ox Panel: Yes    Basic Metabolic Panel    Hepatitis B Surface Antibody    Hepatitis B Surface Antigen    Undress &  Gown    Continuous Pulse Oximetry    Vital Signs    Place Sequential Compression Device    Discontinue Cardiac Monitoring    Reason for COPD Admission: Natural Disease Progression    Activity as Tolerated    Discharge Follow-up with PCP    Discharge Follow-up with Specified Provider: Nephrologist Dr. García's group for hemodialysis as recommended    Diet: Cardiac Diets, Renal Diets; Healthy Heart (2-3 Na+); Regular (IDDSI 7); Thin (IDDSI 0); Low Potassium, Low Phosphorus    ECG 12 Lead ED Triage Standing Order; SOA    Hemodialysis Inpatient    Hemodialysis Inpatient    Initiate Observation Status    Discharge patient    CBC & Differential    Green Top (Gel)    Lavender Top    Gold Top - SST    Light Blue Top    Extra Tubes    Lavender Top       Medications Given in the Emergency Department:  Medications   ondansetron (ZOFRAN) injection 4 mg (4 mg Intravenous Given 4/1/24 1128)   ipratropium-albuterol (DUO-NEB) nebulizer solution 3 mL (3 mL Nebulization Given 4/1/24 1316)        ED Course:         Labs:    Lab Results (last 24 hours)       ** No results found for the last 24 hours. **             Imaging:    No Radiology Exams Resulted Within Past 24 Hours      Differential Diagnosis and Discussion:    Dyspnea: Differential diagnosis includes but is not limited to metabolic acidosis, neurological disorders, psychogenic, asthma, pneumothorax, upper airway obstruction, COPD, pneumonia, noncardiogenic pulmonary edema, interstitial lung disease, anemia, congestive heart failure, and pulmonary embolism    All labs were reviewed and interpreted by me.  All X-rays impressions were independently interpreted by me.  EKG was interpreted by me.    MDM     Amount and/or Complexity of Data Reviewed  Decide to obtain previous medical records or to obtain history from someone other than the patient: yes             Patient Care Considerations:    SEPSIS was considered but is NOT present in the emergency department as SIRS criteria  is not present.      Consultants/Shared Management Plan:    PCP: I have discussed the case with Dr. UYEN Lyons who agrees to accept the patient for admission.    Social Determinants of Health:    Patient is independent, reliable, and has access to care.       Disposition and Care Coordination:    Admit:   Through independent evaluation of the patient's history, physical, and imperical data, the patient meets criteria for inpatient admission to the hospital.        Final diagnoses:   Acute exacerbation of chronic obstructive pulmonary disease (COPD)   Chronic kidney disease with end stage renal failure on dialysis        ED Disposition       ED Disposition   Decision to Admit    Condition   --    Comment   Level of Care: Med/Surg [1]   Diagnosis: COPD exacerbation [526046]   Admitting Physician: TAWANNA LYONS [[id number]]   Attending Physician: TAWANNA LYONS [[id number]]                 This medical record created using voice recognition software.             Bentley Conti DO  04/08/24 1700

## 2024-04-01 NOTE — PAYOR COMM NOTE
"Josefina Rios (89 y.o. Female)     PATIENT INFORMATION  Name:  Josefina Rios  MRN#:     [id number]  :  1935       ADMISSION INFORMATION  CLASS: Observation   DOS:  24      CURRENT ATTENDING PROVIDER INFORMATION  Name/NPI: Rafael Lyons MD (NPI: [id number])   Phone:             Phone: [phone]      REQUESTING PROVIDER and RENDERING FACILITY  Name:  [facility]   NPI:  [id number]  TID:  [id number]  Address:      [address] [place], [place] [address] [id number]  Phone  [phone]      UTILIZATION REVIEW CONTACT INFORMATION  Phone:      [phone]  Fax:           [phone]        ADMISSION DIAGNOSIS        ++++++++++++++++++++++++++++++++++++++++++++++++++++++++++++++++++++++++++++++++          Date of Birth   1935    Social Security Number       Address   [address] [id number]    Home Phone   [phone]    MRN   [id number]       Mormonism   Rastafari    Marital Status                               Admission Date   24    Admission Type   Emergency    Admitting Provider   Rafael Lyons MD    Attending Provider   Bentley Conti DO    Department, Room/Bed   [facility] EMERGENCY ROOM,        Discharge Date       Discharge Disposition       Discharge Destination                                 Attending Provider: Bentley Conti DO    Allergies: No Known Allergies    Isolation: None   Infection: None   Code Status: CPR    Ht: 165.1 cm (65\")   Wt: 67.5 kg (148 lb 13 oz)    Admission Cmt: None   Principal Problem: None                  Active Insurance as of 2024       Primary Coverage       Payor Plan Insurance Group Employer/Plan Group    HUMANA MEDICARE REPLACEMENT HUMANA MED ADV GROUP [id number]       Payor Plan Address Payor Plan Phone Number Payor Plan Fax Number Effective Dates    [address] [address] [phone]  2024 - None Entered    [facility], located within [facility] [address]         Subscriber Name Subscriber Birth Date Member ID       JEMIMA RIOSRIE R " 1935 [id number]                             Chronic Obstructive Pulmonary Disease [facility] Observation Care       Indications Met   Last updated by Tanvi Dietrich RN on 2024 1742     Review Status Created By   Primary Completed Tanvi Dietrich RN      Criteria Review   Chronic Obstructive Pulmonary Disease RRG Observation Care     Overall Determination: Indications Met     Criteria:  [×] Observation care is indicated for  1 or more  of the following :      [×] New or worsened (eg, from baseline) signs or symptoms of COPD (eg, dyspnea, Tachypnea) with inadequate response to initial emergency department treatment          2024  5:42 PM              -- 2024  5:42 PM by Tanvi Dietrich, RN --                  Worsening SOA. On 3L O2 @ home. On O2 in ED. Received Duoneb. Still SOA. Missed dialysis today &  needs to have a HD treatment.     Notes:  -- 2024  5:42 PM by Tanvi Dietrich, RN --      To ED c/o worsening SOA over the past 24 hrs. Skipped Hemodialysis today r/t SOA. + Wheezing & coughing.       PMHx: Asthma, Hx Lung cancer; CKD; CHF; COPD; DVT; DM; HTN; GERD; HLD: L AV shunt. Femoral artery stent; L TKR.                   ED results: 1st trop 67. Repeat trop 67; BNP 22,649.0; BUN 52; Creatinine 4.71; K+ 5.3; GFR 8.5; Calcium 8.0; H/H 9.1/30; COVID/Flu NEG.       CXR: underlying interstitial edema.                   In ED: O2 @ 2.5L; Zofran IV; Duoneb x1                  Admit: Nephrology consult; SoluMedrol IV q8h; Duonebs q4h; Zofran IV prn; Dilaudid IV prn;       Dialysis orders per Nephrology. Nephrology orders pending.                 History & Physical        Rafael Lyons MD at 24 1653           [facility]   HISTORY AND PHYSICAL    Patient Name: Charlette Rai  : 1935  MRN: [id number]  Primary Care Physician:  Rafael Lyons MD  Date of admission: 2024    Subjective  Subjective     Chief Complaint:   Shortness of breath      HPI:    Charlette Rai is a 89 y.o. female  well-known to me with history of COPD and end-stage renal disease, came to emergency room with increasing shortness of breath since early this morning.  Patient was due for dialysis today she did not go for dialysis.  Her fistula is causing problems and getting clotted.  It was declotted last week on Tuesday and she had dialysis on Wednesday and Friday.  She reports increasing shortness of breath for last 24 hours, coughing and wheezing        Review of Systems:    No fever chills.  Shortness of breath.  Wheezing.  Tired fatigue.  Decreased appetite    Personal History     Past Medical History:   Diagnosis Date    Allergic rhinitis     Anemia     Arthritis     Asthma     USE INHALERS AND NEBULIZERS    Back pain     Bladder disorder     Cancer     LEFT LUNG CANCER 2005 SURGERY AND CHEMO DONE  AND CURRENTLY 4/ 14/22  RIGHT LUNG CANCER HAS ONLY RECEIVED RADIATION THUS FAR    Chronic kidney disease     stage 3    CKD (chronic kidney disease) stage 4, GFR 15-29 ml/min     Condition not found     ulcer    Congestive heart failure (CHF)     FOLLOWED BY DR AQUINO. DENIES CP BUT DOES HAVE SOA CHRONIC ISSUE COPD/LUNG CANCER    COPD (chronic obstructive pulmonary disease)     FOLLOWED BY DR MARIAJOSE BAILEY    Coronary artery disease     DENIES CP BUT DOES GET SOA MOST OF THE TIME WITH EXERTION BUT OCC AT REST CHRONIC ISSUE COPD/LUNG CANCER    Deep vein thrombosis     Diabetes mellitus     DOES NOT CHECK BS DAILY    Disease of thyroid gland     HYPOTHYROIDISM    Essential hypertension     Gastric ulcer     GERD (gastroesophageal reflux disease)     Heart murmur     History of transfusion     NO ISSUES POST TRANSFUSION WAS MANY YEARS AGO    Hyperlipemia     Leukocytopenia     FOLLOWED BY DR MIR BAILEY    Limb swelling     Lumbago     Lumbar spinal stenosis     Lung cancer     Migraine headache     Multiple joint pain     On home oxygen therapy     3L/NC PRN    Osteopenia     Reflux esophagitis     Shortness of breath     Thyroid nodule      HAS ULTRASOUND YEARLY BEING MONITORED    Vascular disease     Vitamin D deficiency        Past Surgical History:   Procedure Laterality Date    ABDOMINAL SURGERY      APPENDECTOMY      ARTERIOVENOUS FISTULA/SHUNT SURGERY Left 05/10/2022    Procedure: Left basilic vein transposition;  Surgeon: Wan Barksdale MD;  Location: [facility] MAIN OR;  Service: Vascular;  Laterality: Left;    ARTERIOVENOUS FISTULA/SHUNT SURGERY Left 3/23/2023    Procedure: Ligation of left arm arteriovenous fistula;  Surgeon: Wan Barksdale MD;  Location: [facility] MAIN OR;  Service: Vascular;  Laterality: Left;    ARTERIOVENOUS FISTULA/SHUNT SURGERY Left 11/30/2023    Procedure: Creation of left arm arteriovenous graft;  Surgeon: Wan Barksdale MD;  Location: [facility] MAIN OR;  Service: Vascular;  Laterality: Left;    CARDIAC CATHETERIZATION  1996    CARDIAC SURGERY      CARDIAC SURGERY      fluid drained from heart    CATARACT EXTRACTION, BILATERAL  2003    COLONOSCOPY  2014    ENDOSCOPY  2016 2019    FEMORAL ARTERY STENT Bilateral     HYSTERECTOMY      LUNG BIOPSY Left 2005    lobectomy upper lung caner    LUNG VOLUME REDUCTION      OTHER SURGICAL HISTORY      artifical joints/limbs    REPLACEMENT TOTAL KNEE Left 2016    UPPER GASTROINTESTINAL ENDOSCOPY         Family History: family history includes Arthritis in her father and mother; Cancer in her brother, brother, brother, and brother; Diabetes in her brother; Other in her brother; Prostate cancer in her brother and brother. Otherwise pertinent FHx was reviewed and not pertinent to current issue.    Social History:  reports that she quit smoking about 19 years ago. Her smoking use included cigarettes. She started smoking about 72 years ago. She has a 52.5 pack-year smoking history. She has been exposed to tobacco smoke. She has never used smokeless tobacco. She reports that she does not drink alcohol and does not use drugs.    Home Medications:  albuterol sulfate HFA,  budesonide-formoterol, carvedilol, dexlansoprazole, ergocalciferol, hydrALAZINE, levocetirizine, linagliptin, nitroglycerin, polycarbophil, rosuvastatin, and sevelamer      Allergies:  No Known Allergies    Objective  Objective     Vitals:   Temp:  [98.1 °F (36.7 °C)] 98.1 °F (36.7 °C)  Heart Rate:  [73-76] 74  Resp:  [15-16] 16  BP: (154-177)/(47-51) 176/50  Flow (L/min):  [2] 2    Physical Exam    Anyi female looks of her stated age, in mild respiratory distress.  Neck supple using accessory muscles.  Trachea central.  Heart regular.  Lungs diffuse rhonchi.  Some crackles at the bases.  Abdomen soft.  Extremities without any edema      I have personally reviewed the results from the time of this admission to 4/1/2024 16:55 EDT and agree with these findings:  [x]  Laboratory  [x]  Microbiology  [x]  Radiology  []  EKG/Telemetry   []  Cardiology/Vascular   []  Pathology  []  Old records  []  Other:    CBC:    WBC   Date Value Ref Range Status   04/01/2024 10.00 3.40 - 10.80 10*3/mm3 Final     RBC   Date Value Ref Range Status   04/01/2024 3.06 (L) 3.77 - 5.28 10*6/mm3 Final     Hemoglobin   Date Value Ref Range Status   04/01/2024 9.1 (L) 12.0 - 15.9 g/dL Final     Hematocrit   Date Value Ref Range Status   04/01/2024 30.8 (L) 34.0 - 46.6 % Final     MCV   Date Value Ref Range Status   04/01/2024 100.7 (H) 79.0 - 97.0 fL Final     MCH   Date Value Ref Range Status   04/01/2024 29.7 26.6 - 33.0 pg Final     MCHC   Date Value Ref Range Status   04/01/2024 29.5 (L) 31.5 - 35.7 g/dL Final     RDW   Date Value Ref Range Status   04/01/2024 14.6 12.3 - 15.4 % Final     RDW-SD   Date Value Ref Range Status   04/01/2024 53.8 37.0 - 54.0 fl Final     MPV   Date Value Ref Range Status   04/01/2024 9.4 6.0 - 12.0 fL Final     Platelets   Date Value Ref Range Status   04/01/2024 187 140 - 450 10*3/mm3 Final     Neutrophil %   Date Value Ref Range Status   04/01/2024 79.3 (H) 42.7 - 76.0 % Final     Lymphocyte %   Date Value Ref  Range Status   04/01/2024 8.0 (L) 19.6 - 45.3 % Final     Monocyte %   Date Value Ref Range Status   04/01/2024 10.0 5.0 - 12.0 % Final     Eosinophil %   Date Value Ref Range Status   04/01/2024 1.9 0.3 - 6.2 % Final     Basophil %   Date Value Ref Range Status   04/01/2024 0.5 0.0 - 1.5 % Final     Immature Grans %   Date Value Ref Range Status   04/01/2024 0.3 0.0 - 0.5 % Final     Neutrophils, Absolute   Date Value Ref Range Status   04/01/2024 7.93 (H) 1.70 - 7.00 10*3/mm3 Final     Lymphocytes, Absolute   Date Value Ref Range Status   04/01/2024 0.80 0.70 - 3.10 10*3/mm3 Final     Monocytes, Absolute   Date Value Ref Range Status   04/01/2024 1.00 (H) 0.10 - 0.90 10*3/mm3 Final     Eosinophils, Absolute   Date Value Ref Range Status   04/01/2024 0.19 0.00 - 0.40 10*3/mm3 Final     Basophils, Absolute   Date Value Ref Range Status   04/01/2024 0.05 0.00 - 0.20 10*3/mm3 Final     Immature Grans, Absolute   Date Value Ref Range Status   04/01/2024 0.03 0.00 - 0.05 10*3/mm3 Final     nRBC   Date Value Ref Range Status   04/01/2024 0.0 0.0 - 0.2 /100 WBC Final        BMP:    Lab Results   Component Value Date    GLUCOSE 127 (H) 04/01/2024    BUN 52 (H) 04/01/2024    CREATININE 4.71 (H) 04/01/2024    EGFRIFNONA 17 (L) 01/10/2022    BCR 11.0 04/01/2024    K 5.3 (H) 04/01/2024    CO2 27.5 04/01/2024    CALCIUM 8.0 (L) 04/01/2024    ALBUMIN 3.5 04/01/2024    LABIL2 1.5 05/05/2021    AST 18 04/01/2024    ALT 15 04/01/2024        XR Chest 1 View    Result Date: 4/1/2024  IMPRESSION :  1. Mild prominence of the interstitial markings again noted slightly increased in the comparison. Findings represent underlying interstitial edema given findings on recent CT. Superimposed pneumonia is not excluded. [  Electronically Signed By-Eleazar Burris On:4/1/2024 9:20 AM              Assessment & Plan  Assessment / Plan       Current Diagnosis:  Active Hospital Problems    Diagnosis     COPD exacerbation     Volume overload      ESRD (end stage renal disease)      Plan:   Admit to hospital, patient missed dialysis today, nephrologist on-call was notified by ER.  Will give Lasix x 1.  For COPD will start on DuoNebs.  IV steroids.  Restart essential home meds        DVT prophylaxis:  Mechanical DVT prophylaxis orders are signed and held.          GI Prophylaxis:       Pepcid    CODE STATUS:    Code Status (Patient has no pulse and is not breathing): CPR (Attempt to Resuscitate)  Medical Interventions (Patient has pulse or is breathing): Full Support    Admission Status:  I believe this patient meets observation status.             I have dictated this note utilizing Dragon Dictation.             Please note that portions of this note were completed with a voice recognition program.             Part of this note may be an electronic transcription/translation of spoken language to printed text         using the Dragon Dictation System.       Electronically signed by Rafael Lyons MD, 04/01/24, 4:53 PM EDT.    Total time spent with in evaluation and management:           Electronically signed by Rafael Lyons MD at 04/01/24 1659       Vital Signs (last day)       Date/Time Temp Temp src Pulse Resp BP Patient Position SpO2    04/01/24 1702 -- -- 75 14 176/50 Sitting 96    04/01/24 1600 -- -- 74 -- 176/50 -- 94    04/01/24 1318 -- -- 75 16 -- Lying 96    04/01/24 1200 98.1 (36.7) Oral 73 16 177/49 Lying 95    04/01/24 1000 98.1 (36.7) Oral 76 16 173/51 Lying 94    04/01/24 0906 -- -- -- -- 154/47 Sitting --    04/01/24 0905 98.1 (36.7) Oral 74 15 -- Sitting 87          Facility-Administered Medications as of 4/1/2024   Medication Dose Route Frequency Provider Last Rate Last Admin    [COMPLETED] ipratropium-albuterol (DUO-NEB) nebulizer solution 3 mL  3 mL Nebulization Once Bentley Conti DO   3 mL at 04/01/24 1316    [COMPLETED] ondansetron (ZOFRAN) injection 4 mg  4 mg Intravenous Once Bentley Conti DO   4 mg at 04/01/24 1128    sodium chloride 0.9 %  flush 10 mL  10 mL Intravenous PRN Bentley Conti,          Lab Results (last 24 hours)       Procedure Component Value Units Date/Time    COVID-19, FLU A/B, RSV PCR 1 HR TAT - Swab, Nasopharynx [[id number]]  (Normal) Collected: 04/01/24 1131    Specimen: Swab from Nasopharynx Updated: 04/01/24 1315     COVID19 Not Detected     Influenza A PCR Not Detected     Influenza B PCR Not Detected     RSV, PCR Not Detected    Narrative:      Fact sheet for providers: https://www.fda.gov/media/147326/download    Fact sheet for patients: https://www.fda.gov/media/650599/download    Test performed by PCR.    High Sensitivity Troponin T 2Hr [[id number]]  (Abnormal) Collected: 04/01/24 1139    Specimen: Blood Updated: 04/01/24 1230     HS Troponin T 67 ng/L      Troponin T Delta 0 ng/L     Narrative:      High Sensitive Troponin T Reference Range:  <14.0 ng/L- Negative Female for AMI  <22.0 ng/L- Negative Male for AMI  >=14 - Abnormal Female indicating possible myocardial injury.  >=22 - Abnormal Male indicating possible myocardial injury.   Clinicians would have to utilize clinical acumen, EKG, Troponin, and serial changes to determine if it is an Acute Myocardial Infarction or myocardial injury due to an underlying chronic condition.         Blood Gas, Arterial -With Co-Ox Panel: Yes [[id number]]  (Abnormal) Collected: 04/01/24 1126    Specimen: Arterial Blood Updated: 04/01/24 1134     pH, Arterial 7.374 pH units      pCO2, Arterial 42.8 mm Hg      pO2, Arterial 71.3 mm Hg      HCO3, Arterial 24.4 mmol/L      Base Excess, Arterial -0.8 mmol/L      O2 Saturation, Arterial 93.8 %      Hemoglobin, Blood Gas 10.6 g/dL      Carboxyhemoglobin 0.7 %      Methemoglobin 0.30 %      Oxyhemoglobin 92.9 %      FHHB 6.1 %      Site Arterial: right radial     Modality Cannula - Nasal     Flow Rate 2 lpm     Single High Sensitivity Troponin T [[id number]]  (Abnormal) Collected: 04/01/24 0926    Specimen: Blood Updated: 04/01/24 1013     HS  Troponin T 67 ng/L     Narrative:      High Sensitive Troponin T Reference Range:  <14.0 ng/L- Negative Female for AMI  <22.0 ng/L- Negative Male for AMI  >=14 - Abnormal Female indicating possible myocardial injury.  >=22 - Abnormal Male indicating possible myocardial injury.   Clinicians would have to utilize clinical acumen, EKG, Troponin, and serial changes to determine if it is an Acute Myocardial Infarction or myocardial injury due to an underlying chronic condition.         Comprehensive Metabolic Panel [[id number]]  (Abnormal) Collected: 04/01/24 0926    Specimen: Blood Updated: 04/01/24 0953     Glucose 127 mg/dL      BUN 52 mg/dL      Creatinine 4.71 mg/dL      Sodium 137 mmol/L      Potassium 5.3 mmol/L      Chloride 99 mmol/L      CO2 27.5 mmol/L      Calcium 8.0 mg/dL      Total Protein 6.2 g/dL      Albumin 3.5 g/dL      ALT (SGPT) 15 U/L      AST (SGOT) 18 U/L      Alkaline Phosphatase 186 U/L      Total Bilirubin 0.3 mg/dL      Globulin 2.7 gm/dL      A/G Ratio 1.3 g/dL      BUN/Creatinine Ratio 11.0     Anion Gap 10.5 mmol/L      eGFR 8.5 mL/min/1.73      Comment: <15 Indicative of kidney failure       Narrative:      GFR Normal >60  Chronic Kidney Disease <60  Kidney Failure <15    The GFR formula is only valid for adults with stable renal function between ages 18 and 70.    BNP [[id number]]  (Abnormal) Collected: 04/01/24 0926    Specimen: Blood Updated: 04/01/24 0952     proBNP 22,649.0 pg/mL     Narrative:      This assay is used as an aid in the diagnosis of individuals suspected of having heart failure. It can be used as an aid in the diagnosis of acute decompensated heart failure (ADHF) in patients presenting with signs and symptoms of ADHF to the emergency department (ED). In addition, NT-proBNP of <300 pg/mL indicates ADHF is not likely.    Age Range Result Interpretation  NT-proBNP Concentration (pg/mL:      <50             Positive            >450                   Florian                  300-450                    Negative             <300    50-75           Positive            >900                  Gray                300-900                  Negative            <300      >75             Positive            >1800                  Gray                300-1800                  Negative            <300    [place] Draw [[id number]] Collected: 04/01/24 0926    Specimen: Blood Updated: 04/01/24 0943    Narrative:      The following orders were created for panel order [place] Draw.  Procedure                               Abnormality         Status                     ---------                               -----------         ------                     Green Top (Gel)[[id number]]                                  Final result               Lavender Top[[id number]]                                     Final result               Gold Top - SST[[id number]]                                   Final result               Light Blue Top[[id number]]                                   Final result                 Please view results for these tests on the individual orders.    Gold Top - SST [[id number]] Collected: 04/01/24 0926    Specimen: Blood Updated: 04/01/24 0943     Extra Tube Hold for add-ons.     Comment: Auto resulted.       Green Top (Gel) [[id number]] Collected: 04/01/24 0926    Specimen: Blood Updated: 04/01/24 0943     Extra Tube Hold for add-ons.     Comment: Auto resulted.       Light Blue Top [[id number]] Collected: 04/01/24 0926    Specimen: Blood Updated: 04/01/24 0943     Extra Tube Hold for add-ons.     Comment: Auto resulted       Lavender Top [[id number]] Collected: 04/01/24 0926    Specimen: Blood Updated: 04/01/24 0942     Extra Tube hold for add-on     Comment: Auto resulted       CBC & Differential [[id number]]  (Abnormal) Collected: 04/01/24 0926    Specimen: Blood Updated: 04/01/24 0931    Narrative:      The following orders were created for panel order CBC & Differential.  Procedure                                Abnormality         Status                     ---------                               -----------         ------                     CBC Auto Differential[[id number]]        Abnormal            Final result                 Please view results for these tests on the individual orders.    CBC Auto Differential [[id number]]  (Abnormal) Collected: 04/01/24 0926    Specimen: Blood Updated: 04/01/24 0931     WBC 10.00 10*3/mm3      RBC 3.06 10*6/mm3      Hemoglobin 9.1 g/dL      Hematocrit 30.8 %      .7 fL      MCH 29.7 pg      MCHC 29.5 g/dL      RDW 14.6 %      RDW-SD 53.8 fl      MPV 9.4 fL      Platelets 187 10*3/mm3      Neutrophil % 79.3 %      Lymphocyte % 8.0 %      Monocyte % 10.0 %      Eosinophil % 1.9 %      Basophil % 0.5 %      Immature Grans % 0.3 %      Neutrophils, Absolute 7.93 10*3/mm3      Lymphocytes, Absolute 0.80 10*3/mm3      Monocytes, Absolute 1.00 10*3/mm3      Eosinophils, Absolute 0.19 10*3/mm3      Basophils, Absolute 0.05 10*3/mm3      Immature Grans, Absolute 0.03 10*3/mm3      nRBC 0.0 /100 WBC           Imaging Results (Last 24 Hours)       Procedure Component Value Units Date/Time    XR Chest 1 View [[id number]] Collected: 04/01/24 0916     Updated: 04/01/24 0922    Narrative:         DATE OF EXAM:   4/1/2024 9:15 AM     PROCEDURE:   XR CHEST 1 VW-     INDICATIONS:   SOA Triage Protocol     COMPARISON:  3/13/2024 and prior     TECHNIQUE:   Portable Chest     FINDINGS:      Study limited by overlying support and monitoring apparatus.     Postsurgical changes again noted over the left hemithorax. Pleural  plaques and scarring within the left hemithorax appear grossly stable.  Focal opacity right upper lung zone extending towards the right hilum  appears grossly stable. Coarsening of the interstitial markings noted  diffusely. This is slightly increased in the comparison. No significant  pleural effusion noted.       Impression:      IMPRESSION :      1. Mild  prominence of the interstitial markings again noted slightly  increased in the comparison. Findings represent underlying interstitial  edema given findings on recent CT. Superimposed pneumonia is not  excluded. [     Electronically Signed By-Eleazar Burris On:4/1/2024 9:20 AM             Orders (last 24 hrs)        Start     Ordered    04/01/24 1620  Initiate Observation Status  Once         04/01/24 1621    04/01/24 1620  Code Status and Medical Interventions:  Continuous         04/01/24 1621    04/01/24 1545  General MD Inpatient Consult  Once        Provider:  Rafael Lyons MD    04/01/24 1544    04/01/24 1440  General MD Inpatient Consult  Once        Provider:  Edi García MD    04/01/24 1439    04/01/24 1300  ipratropium-albuterol (DUO-NEB) nebulizer solution 3 mL  Once         04/01/24 1237    04/01/24 1145  ondansetron (ZOFRAN) injection 4 mg  Once         04/01/24 1119    04/01/24 1126  High Sensitivity Troponin T 2Hr  PROCEDURE ONCE         04/01/24 1013    04/01/24 1119  Blood Gas, Arterial -With Co-Ox Panel: Yes  STAT         04/01/24 1118    04/01/24 1119  COVID-19, FLU A/B, RSV PCR 1 HR TAT - Swab, Nasopharynx  Once         04/01/24 1118    04/01/24 0907  NPO Diet NPO Type: Strict NPO  Diet Effective Now         04/01/24 0906    04/01/24 0907  Undress & Gown  Once         04/01/24 0906    04/01/24 0907  Cardiac Monitoring  Continuous        Comments: Follow Standing Orders As Outlined in Process Instructions (Open Order Report to View Full Instructions)    04/01/24 0906    04/01/24 0907  Continuous Pulse Oximetry  Continuous         04/01/24 0906    04/01/24 0907  Vital Signs  Per Hospital Policy         04/01/24 0906    04/01/24 0907  ECG 12 Lead ED Triage Standing Order; SOA  Once         04/01/24 0906    04/01/24 0907  XR Chest 1 View  1 Time Imaging         04/01/24 0906    04/01/24 0907  Insert Peripheral IV  Once         04/01/24 0906    04/01/24 0907  [place] Draw  Once          04/01/24 0906    04/01/24 0907  CBC & Differential  Once         04/01/24 0906    04/01/24 0907  Comprehensive Metabolic Panel  Once         04/01/24 0906    04/01/24 0907  BNP  Once         04/01/24 0906    04/01/24 0907  Single High Sensitivity Troponin T  Once         04/01/24 0906    04/01/24 0907  Green Top (Gel)  PROCEDURE ONCE         04/01/24 0906    04/01/24 0907  Lavender Top  PROCEDURE ONCE         04/01/24 0906    04/01/24 0907  Gold Top - SST  PROCEDURE ONCE         04/01/24 0906    04/01/24 0907  Light Blue Top  PROCEDURE ONCE         04/01/24 0906    04/01/24 0907  CBC Auto Differential  PROCEDURE ONCE         04/01/24 0906    04/01/24 0906  sodium chloride 0.9 % flush 10 mL  As Needed         04/01/24 0906    Unscheduled  Oxygen Therapy- Nasal Cannula; Titrate 1-6 LPM Per SpO2; 90 - 95%  Continuous PRN       04/01/24 0906    Signed and Held  carvedilol (COREG) tablet 25 mg  2 Times Daily With Meals         Signed and Held    Signed and Held  hydrALAZINE (APRESOLINE) tablet 25 mg  Every 12 Hours Scheduled         Signed and Held    Signed and Held  linagliptin (TRADJENTA) tablet 5 mg  Daily         Signed and Held    Signed and Held  rosuvastatin (CRESTOR) tablet 20 mg  Daily         Signed and Held    Signed and Held  sevelamer (RENVELA) tablet 800 mg  3 Times Daily With Meals         Signed and Held    Signed and Held  Vital Signs  Every 4 Hours       Signed and Held    Signed and Held  Up With Assistance  As Needed         Signed and Held    Signed and Held  Intake & Output  Every Shift       Signed and Held    Signed and Held  Weigh Patient  Once         Signed and Held    Signed and Held  Oral Care  2 Times Daily       Signed and Held    Signed and Held  Inpatient Case Management  Consult  Once        Provider:  (Not yet assigned)    Signed and Held    Signed and Held  Insert Peripheral IV  Once         Signed and Held    Signed and Held  Saline Lock & Maintain IV Access   "Continuous         Signed and Held    Signed and Held  sodium chloride 0.9 % flush 10 mL  Every 12 Hours Scheduled         Signed and Held    Signed and Held  sodium chloride 0.9 % flush 10 mL  As Needed         Signed and Held    Signed and Held  sodium chloride 0.9 % infusion 40 mL  As Needed         Signed and Held    Signed and Held  acetaminophen (TYLENOL) tablet 650 mg  Every 4 Hours PRN         Signed and Held    Signed and Held  HYDROcodone-acetaminophen (NORCO) 5-325 MG per tablet 1 tablet  Every 4 Hours PRN         Signed and Held    Signed and Held  HYDROmorphone (DILAUDID) injection 0.5 mg  Every 2 Hours PRN        Placed in \"And\" Linked Group    Signed and Held    Signed and Held  naloxone (NARCAN) injection 0.4 mg  Every 5 Minutes PRN        Placed in \"And\" Linked Group    Signed and Held    Signed and Held  aluminum-magnesium hydroxide-simethicone (MAALOX MAX) 400-400-40 MG/5ML suspension 15 mL  Every 6 Hours PRN         Signed and Held    Signed and Held  famotidine (PEPCID) tablet 40 mg  Daily         Signed and Held    Signed and Held  ALPRAZolam (XANAX) tablet 0.25 mg  Every 6 Hours PRN         Signed and Held    Signed and Held  ondansetron (ZOFRAN) injection 4 mg  Every 4 Hours PRN         Signed and Held    Signed and Held  temazepam (RESTORIL) capsule 15 mg  Nightly PRN         Signed and Held    Signed and Held  Place Sequential Compression Device  Once         Signed and Held    Signed and Held  Maintain Sequential Compression Device  Continuous         Signed and Held    Signed and Held  Up in Chair  As Needed         Signed and Held    Signed and Held  Oxygen Therapy- Nasal Cannula; Titrate 1-6 LPM Per SpO2; 90 - 95%  Continuous PRN         Signed and Held    Signed and Held  sennosides-docusate (PERICOLACE) 8.6-50 MG per tablet 2 tablet  2 Times Daily PRN        Placed in \"And\" Linked Group    Signed and Held    Signed and Held  polyethylene glycol (MIRALAX) packet 17 g  Daily PRN      " "  Placed in \"And\" Linked Group    Signed and Held    Signed and Held  bisacodyl (DULCOLAX) EC tablet 5 mg  Daily PRN        Placed in \"And\" Linked Group    Signed and Held    Signed and Held  bisacodyl (DULCOLAX) suppository 10 mg  Daily PRN        Placed in \"And\" Linked Group    Signed and Held    Signed and Held  methylPREDNISolone sodium succinate (SOLU-Medrol) injection 40 mg  Every 8 Hours         Signed and Held    Signed and Held  ipratropium-albuterol (DUO-NEB) nebulizer solution 3 mL  Every 4 Hours - RT         Signed and Held    Signed and Held  Inpatient Nephrology Consult  Once        Specialty:  Nephrology  Provider:  Edi García MD    Signed and Held    Signed and Held  Discontinue Cardiac Monitoring  Once         Signed and Held                  "

## 2024-04-02 PROCEDURE — G0378 HOSPITAL OBSERVATION PER HR: HCPCS

## 2024-04-02 PROCEDURE — 25010000002 METHYLPREDNISOLONE PER 40 MG: Performed by: INTERNAL MEDICINE

## 2024-04-02 PROCEDURE — G0257 UNSCHED DIALYSIS ESRD PT HOS: HCPCS

## 2024-04-02 PROCEDURE — 94799 UNLISTED PULMONARY SVC/PX: CPT

## 2024-04-02 PROCEDURE — 96376 TX/PRO/DX INJ SAME DRUG ADON: CPT

## 2024-04-02 PROCEDURE — 94664 DEMO&/EVAL PT USE INHALER: CPT

## 2024-04-02 RX ADMIN — SEVELAMER CARBONATE 800 MG: 800 TABLET, FILM COATED ORAL at 08:29

## 2024-04-02 RX ADMIN — METHYLPREDNISOLONE SODIUM SUCCINATE 40 MG: 40 INJECTION INTRAMUSCULAR; INTRAVENOUS at 02:30

## 2024-04-02 RX ADMIN — Medication 10 ML: at 21:20

## 2024-04-02 RX ADMIN — IPRATROPIUM BROMIDE AND ALBUTEROL SULFATE 3 ML: .5; 3 SOLUTION RESPIRATORY (INHALATION) at 07:37

## 2024-04-02 RX ADMIN — FAMOTIDINE 10 MG: 20 TABLET ORAL at 08:29

## 2024-04-02 RX ADMIN — IPRATROPIUM BROMIDE AND ALBUTEROL SULFATE 3 ML: .5; 3 SOLUTION RESPIRATORY (INHALATION) at 23:10

## 2024-04-02 RX ADMIN — LINAGLIPTIN 5 MG: 5 TABLET, FILM COATED ORAL at 08:29

## 2024-04-02 RX ADMIN — ROSUVASTATIN CALCIUM 20 MG: 20 TABLET, FILM COATED ORAL at 21:19

## 2024-04-02 RX ADMIN — HYDRALAZINE HYDROCHLORIDE 25 MG: 50 TABLET, FILM COATED ORAL at 21:19

## 2024-04-02 RX ADMIN — IPRATROPIUM BROMIDE AND ALBUTEROL SULFATE 3 ML: .5; 3 SOLUTION RESPIRATORY (INHALATION) at 18:29

## 2024-04-02 RX ADMIN — CARVEDILOL 25 MG: 25 TABLET, FILM COATED ORAL at 07:32

## 2024-04-02 RX ADMIN — IPRATROPIUM BROMIDE AND ALBUTEROL SULFATE 3 ML: .5; 3 SOLUTION RESPIRATORY (INHALATION) at 03:54

## 2024-04-02 RX ADMIN — HYDRALAZINE HYDROCHLORIDE 25 MG: 50 TABLET, FILM COATED ORAL at 08:29

## 2024-04-02 RX ADMIN — TEMAZEPAM 15 MG: 15 CAPSULE ORAL at 21:19

## 2024-04-02 RX ADMIN — HYDROCODONE BITARTRATE AND ACETAMINOPHEN 1 TABLET: 5; 325 TABLET ORAL at 07:32

## 2024-04-02 RX ADMIN — Medication 10 ML: at 08:31

## 2024-04-02 RX ADMIN — SEVELAMER CARBONATE 800 MG: 800 TABLET, FILM COATED ORAL at 17:27

## 2024-04-02 RX ADMIN — METHYLPREDNISOLONE SODIUM SUCCINATE 40 MG: 40 INJECTION INTRAMUSCULAR; INTRAVENOUS at 21:18

## 2024-04-02 RX ADMIN — CARVEDILOL 25 MG: 25 TABLET, FILM COATED ORAL at 17:27

## 2024-04-02 NOTE — PLAN OF CARE
Goal Outcome Evaluation:              Outcome Evaluation: Pt vss. Pt went to dialysis today.  Pt medicated  pain once this shift. Continue plan of care.

## 2024-04-02 NOTE — PROGRESS NOTES
[facility]     Progress Note    Patient Name: Charlette Rai  : 1935  MRN: [id number]  Primary Care Physician:  Rafael Lyons MD  Date of admission: 2024      Subjective   Brief summary.  Patient admitted with shortness of breath      HPI:  Feeling better still short of breath with exertion waiting for dialysis.  No wheezing.    Review of Systems     No fever or chills.  Short of breath.  Concerned about fistula may or may not      Objective     Vitals:   Temp:  [97.5 °F (36.4 °C)-98.2 °F (36.8 °C)] 97.5 °F (36.4 °C)  Heart Rate:  [71-79] 78  Resp:  [14-18] 18  BP: (157-177)/(49-60) 168/50  Flow (L/min):  [2-3] 3    Physical Exam :     Elderly female not in acute distress.  Heart regular.  Lungs crackles at bases.  No rhonchi.  Abdomen soft.  Extremities left upper extremity with thrill and bruit over the fistula site      Result Review:  I have personally reviewed the results from the time of this admission to 2024 09:43 EDT and agree with these findings:  [x]  Laboratory  [x]  Microbiology  []  Radiology  []  EKG/Telemetry   []  Cardiology/Vascular   []  Pathology  []  Old records  []  Other:           Assessment / Plan       Active Hospital Problems:  Active Hospital Problems    Diagnosis     COPD exacerbation     Volume overload     ESRD (end stage renal disease)        Plan:   Patient waiting for dialysis, for COPD exacerbation continue steroids and nebs.  Once dialysis is done we will reassess and evaluate.  Continue home meds.       DVT prophylaxis:  Mechanical DVT prophylaxis orders are present.        CODE STATUS:   Code Status (Patient has no pulse and is not breathing): CPR (Attempt to Resuscitate)  Medical Interventions (Patient has pulse or is breathing): Full Support            Electronically signed by Rafael Lyons MD, 24, 9:43 AM EDT.

## 2024-04-02 NOTE — CONSULTS
Patient Care Team:  Rafael Lyons MD as PCP - General (Internal Medicine)  Regis Mckeon MD as Consulting Physician (Radiation Oncology)  Carlton Casillas MD as Consulting Physician (Gastroenterology)  Susan Marcos APRN as Nurse Practitioner (Gastroenterology)    Chief complaint: ESRD    Subjective     History of Present Illness  88yo with h/o ESRD on dialysis m/w/f.  She has history of COPD and presented to ER with increased dyspnea.  She was started on IV steroids and duonebs for COPD exacerbation.  She is going to dialysis now, she tells me breathing improved.    Review of Systems   Constitutional:  Negative for diaphoresis and fatigue.   Respiratory:  Positive for cough, shortness of breath and wheezing.    Cardiovascular:  Negative for chest pain and leg swelling.   Gastrointestinal:  Negative for abdominal pain, diarrhea, nausea and vomiting.   Neurological:  Negative for headaches.        Past Medical History:   Diagnosis Date    Allergic rhinitis     Anemia     Arthritis     Asthma     USE INHALERS AND NEBULIZERS    Back pain     Bladder disorder     Cancer     LEFT LUNG CANCER 2005 SURGERY AND CHEMO DONE  AND CURRENTLY 4/ 14/22  RIGHT LUNG CANCER HAS ONLY RECEIVED RADIATION THUS FAR    Chronic kidney disease     stage 3    CKD (chronic kidney disease) stage 4, GFR 15-29 ml/min     Condition not found     ulcer    Congestive heart failure (CHF)     FOLLOWED BY DR AQUINO. DENIES CP BUT DOES HAVE SOA CHRONIC ISSUE COPD/LUNG CANCER    COPD (chronic obstructive pulmonary disease)     FOLLOWED BY DR MARIAJOSE BAILEY    Coronary artery disease     DENIES CP BUT DOES GET SOA MOST OF THE TIME WITH EXERTION BUT OCC AT REST CHRONIC ISSUE COPD/LUNG CANCER    Deep vein thrombosis     Diabetes mellitus     DOES NOT CHECK BS DAILY    Disease of thyroid gland     HYPOTHYROIDISM    Essential hypertension     Gastric ulcer     GERD (gastroesophageal reflux disease)     Heart murmur     History of transfusion     NO  ISSUES POST TRANSFUSION WAS MANY YEARS AGO    Hyperlipemia     Leukocytopenia     FOLLOWED BY DR MIR BAILEY    Limb swelling     Lumbago     Lumbar spinal stenosis     Lung cancer     Migraine headache     Multiple joint pain     On home oxygen therapy     3L/NC PRN    Osteopenia     Reflux esophagitis     Shortness of breath     Thyroid nodule     HAS ULTRASOUND YEARLY BEING MONITORED    Vascular disease     Vitamin D deficiency    ,   Past Surgical History:   Procedure Laterality Date    ABDOMINAL SURGERY      APPENDECTOMY      ARTERIOVENOUS FISTULA/SHUNT SURGERY Left 05/10/2022    Procedure: Left basilic vein transposition;  Surgeon: Wan Barksdale MD;  Location: [facility] MAIN OR;  Service: Vascular;  Laterality: Left;    ARTERIOVENOUS FISTULA/SHUNT SURGERY Left 3/23/2023    Procedure: Ligation of left arm arteriovenous fistula;  Surgeon: Wan Barksdale MD;  Location: [facility] MAIN OR;  Service: Vascular;  Laterality: Left;    ARTERIOVENOUS FISTULA/SHUNT SURGERY Left 11/30/2023    Procedure: Creation of left arm arteriovenous graft;  Surgeon: Wan Barksdale MD;  Location: [facility] MAIN OR;  Service: Vascular;  Laterality: Left;    CARDIAC CATHETERIZATION  1996    CARDIAC SURGERY      CARDIAC SURGERY      fluid drained from heart    CATARACT EXTRACTION, BILATERAL  2003    COLONOSCOPY  2014    ENDOSCOPY  2016 2019    FEMORAL ARTERY STENT Bilateral     HYSTERECTOMY      LUNG BIOPSY Left 2005    lobectomy upper lung caner    LUNG VOLUME REDUCTION      OTHER SURGICAL HISTORY      artifical joints/limbs    REPLACEMENT TOTAL KNEE Left 2016    UPPER GASTROINTESTINAL ENDOSCOPY     ,   Family History   Problem Relation Age of Onset    Arthritis Mother     Arthritis Father     Cancer Brother     Diabetes Brother     Other Brother         blood disease     Prostate cancer Brother     Cancer Brother     Prostate cancer Brother     Cancer Brother     Cancer Brother     Malig Hyperthermia Neg Hx     Colon cancer Neg Hx    ,    Social History     Socioeconomic History    Marital status:    Tobacco Use    Smoking status: Former     Current packs/day: 0.00     Average packs/day: 1 pack/day for 52.5 years (52.5 ttl pk-yrs)     Types: Cigarettes     Start date:      Quit date: 2004     Years since quittin.7     Passive exposure: Past    Smokeless tobacco: Never   Vaping Use    Vaping status: Never Used   Substance and Sexual Activity    Alcohol use: Never    Drug use: Never    Sexual activity: Defer     E-cigarette/Vaping    E-cigarette/Vaping Use Never User      E-cigarette/Vaping Substances    Nicotine No     THC No     CBD No     Flavoring No      E-cigarette/Vaping Devices    Disposable No     Pre-filled or Refillable Cartridge No     Refillable Tank No     Pre-filled Pod No          ,   Medications Prior to Admission   Medication Sig Dispense Refill Last Dose    albuterol sulfate  (90 Base) MCG/ACT inhaler Inhale 2 puffs Every 4 (Four) Hours As Needed for Wheezing.   3/31/2024    budesonide-formoterol (SYMBICORT) 160-4.5 MCG/ACT inhaler Inhale 2 puffs 2 (Two) Times a Day.   3/31/2024    carvedilol (Coreg) 25 MG tablet Take 1 tablet by mouth 2 (Two) Times a Day With Meals for 30 days. 60 tablet 0     dexlansoprazole (DEXILANT) 60 MG capsule Take 1 capsule by mouth Daily.   3/31/2024    ergocalciferol (ERGOCALCIFEROL) 1.25 MG (74308 UT) capsule Take 1 capsule by mouth Every 14 (Fourteen) Days.       hydrALAZINE (APRESOLINE) 25 MG tablet Take 1 tablet by mouth Every 12 (Twelve) Hours. 60 tablet 0 3/31/2024    levocetirizine (XYZAL) 5 MG tablet TAKE 1 TABLET DAILY (Patient taking differently: Take 1 tablet by mouth Every Evening.) 90 tablet 1 3/31/2024    linagliptin (Tradjenta) 5 MG tablet tablet Take 1 tablet by mouth Daily. 90 tablet 1 3/31/2024    nitroglycerin (NITROSTAT) 0.4 MG SL tablet Place 1 tablet under the tongue Every 5 (Five) Minutes As Needed for Chest Pain.       polycarbophil (calcium  polycarbophil) 625 MG tablet tablet Take 1 tablet by mouth Daily As Needed.   3/31/2024    rosuvastatin (CRESTOR) 20 MG tablet Take 1 tablet by mouth Daily. 90 tablet 3 3/31/2024    sevelamer (RENVELA) 800 MG tablet Take 1 tablet by mouth 3 (Three) Times a Day With Meals.   3/31/2024   , Scheduled Meds:  carvedilol, 25 mg, Oral, BID With Meals  famotidine, 10 mg, Oral, Daily  hydrALAZINE, 25 mg, Oral, Q12H  ipratropium-albuterol, 3 mL, Nebulization, Q4H - RT  linagliptin, 5 mg, Oral, Daily  methylPREDNISolone sodium succinate, 40 mg, Intravenous, Q8H  rosuvastatin, 20 mg, Oral, Nightly  sevelamer, 800 mg, Oral, TID With Meals  sodium chloride, 10 mL, Intravenous, Q12H   , Continuous Infusions:   , PRN Meds:    acetaminophen    ALPRAZolam    aluminum-magnesium hydroxide-simethicone    senna-docusate sodium **AND** polyethylene glycol **AND** bisacodyl **AND** bisacodyl    HYDROcodone-acetaminophen    HYDROmorphone **AND** naloxone    ondansetron    sodium chloride    sodium chloride    sodium chloride    temazepam, and Allergies:  Patient has no known allergies.    Objective     Vital Signs  Temp:  [97.5 °F (36.4 °C)-98.2 °F (36.8 °C)] 97.5 °F (36.4 °C)  Heart Rate:  [71-79] 78  Resp:  [14-18] 18  BP: (157-177)/(49-60) 168/50    I/O this shift:  In: -   Out: 200 [Urine:200]  I/O last 3 completed shifts:  In: -   Out: 300 [Urine:300]    Physical Exam  Constitutional:       Appearance: Normal appearance.   HENT:      Head: Normocephalic.      Mouth/Throat:      Mouth: Mucous membranes are moist.   Eyes:      General: No scleral icterus.     Pupils: Pupils are equal, round, and reactive to light.   Cardiovascular:      Rate and Rhythm: Normal rate.      Pulses: Normal pulses.      Heart sounds: Normal heart sounds.   Pulmonary:      Effort: Pulmonary effort is normal.      Breath sounds: Wheezing present.   Abdominal:      General: Abdomen is flat.      Palpations: Abdomen is soft.   Musculoskeletal:      Cervical back:  "Normal range of motion.      Right lower leg: No edema.      Left lower leg: No edema.   Skin:     General: Skin is warm and dry.   Neurological:      General: No focal deficit present.      Mental Status: She is alert.   Psychiatric:         Mood and Affect: Mood normal.         Results Review:    I reviewed the patient's new clinical results.  I reviewed the patient's new imaging results and agree with the interpretation.  I reviewed the patient's other test results and agree with the interpretation    WBC WBC   Date Value Ref Range Status   04/01/2024 10.00 3.40 - 10.80 10*3/mm3 Final      HGB Hemoglobin   Date Value Ref Range Status   04/01/2024 9.1 (L) 12.0 - 15.9 g/dL Final      HCT Hematocrit   Date Value Ref Range Status   04/01/2024 30.8 (L) 34.0 - 46.6 % Final      Platlets No results found for: \"LABPLAT\"   MCV MCV   Date Value Ref Range Status   04/01/2024 100.7 (H) 79.0 - 97.0 fL Final          Sodium Sodium   Date Value Ref Range Status   04/01/2024 137 136 - 145 mmol/L Final      Potassium Potassium   Date Value Ref Range Status   04/01/2024 5.3 (H) 3.5 - 5.2 mmol/L Final      Chloride Chloride   Date Value Ref Range Status   04/01/2024 99 98 - 107 mmol/L Final      CO2 CO2   Date Value Ref Range Status   04/01/2024 27.5 22.0 - 29.0 mmol/L Final      BUN BUN   Date Value Ref Range Status   04/01/2024 52 (H) 8 - 23 mg/dL Final      Creatinine Creatinine   Date Value Ref Range Status   04/01/2024 4.71 (H) 0.57 - 1.00 mg/dL Final      Calcium Calcium   Date Value Ref Range Status   04/01/2024 8.0 (L) 8.6 - 10.5 mg/dL Final      PO4 No results found for: \"CAPO4\"   Albumin Albumin   Date Value Ref Range Status   04/01/2024 3.5 3.5 - 5.2 g/dL Final      Magnesium No results found for: \"MG\"   Uric Acid No results found for: \"URICACID\"         Assessment & Plan       COPD exacerbation    Volume overload    ESRD (end stage renal disease)      Assessment & Plan  ESRD-  normal dialysis m/w/f with LUE AVF.  Normal " dialysis at [facility] in [place].  Had missed dialysis yesterday.  Dialysis today and again in AM to get back on reg schedule.  COPD- acute exacerbation.  Improved with steroids.  Anemia of CKD-  slightly below goal.  On mircera outpt.  HTN-  continue home meds.  DMII-  diabetic diet.  Hyperphosphatemia-  renal diet.  Renvela with meals.      I discussed the patients findings and my recommendations with patient and primary care team    Elliott Aviles MD  04/02/24  09:40 EDT

## 2024-04-02 NOTE — PLAN OF CARE
Goal Outcome Evaluation:  Plan of Care Reviewed With: patient        Progress: no change  Outcome Evaluation: Dilaudid for pain and restoril for sleep prn. Rested well. No complaints. Rested well. Will continue plan of care.

## 2024-04-02 NOTE — NURSING NOTE
Duration of Treatment 4.0 Hours  Pt ran entire tx   Access Site AVF Pt has AVG PT access ran good    Dialyzer Revaclear    mL/min   Dialysate Temperature (C) 36   BFR-As tolerated to a maximum of: 400 mL/min  Pt ran max bfr   Dialysate Solution Bath: K+ = 2 mEq, Ca = 2.5mEq   Bicarb 33 mEq   Na+ 137 meq   Fluid Removal: 3L      Pt removed 3 liters of fluid   Pt tolerated tx well pt b/p was stable at 171/61 hr 75 pt voice no issues no issues noted. Pt blood returned and hemostasis achieved pt removed 3 liters of fluid . Report given to Karo KHAN.

## 2024-04-03 ENCOUNTER — READMISSION MANAGEMENT (OUTPATIENT)
Dept: CALL CENTER | Facility: HOSPITAL | Age: 89
End: 2024-04-03
Payer: MEDICARE

## 2024-04-03 VITALS
WEIGHT: 148.81 LBS | RESPIRATION RATE: 18 BRPM | HEART RATE: 70 BPM | OXYGEN SATURATION: 98 % | DIASTOLIC BLOOD PRESSURE: 54 MMHG | TEMPERATURE: 97.7 F | SYSTOLIC BLOOD PRESSURE: 173 MMHG | BODY MASS INDEX: 24.76 KG/M2

## 2024-04-03 PROBLEM — J44.1 COPD EXACERBATION: Status: RESOLVED | Noted: 2024-04-01 | Resolved: 2024-04-03

## 2024-04-03 PROBLEM — E87.70 VOLUME OVERLOAD: Status: RESOLVED | Noted: 2024-04-01 | Resolved: 2024-04-03

## 2024-04-03 LAB
ANION GAP SERPL CALCULATED.3IONS-SCNC: 8.1 MMOL/L (ref 5–15)
BUN SERPL-MCNC: 37 MG/DL (ref 8–23)
BUN/CREAT SERPL: 12.8 (ref 7–25)
CALCIUM SPEC-SCNC: 8 MG/DL (ref 8.6–10.5)
CHLORIDE SERPL-SCNC: 100 MMOL/L (ref 98–107)
CO2 SERPL-SCNC: 25.9 MMOL/L (ref 22–29)
CREAT SERPL-MCNC: 2.89 MG/DL (ref 0.57–1)
EGFRCR SERPLBLD CKD-EPI 2021: 15.3 ML/MIN/1.73
GLUCOSE SERPL-MCNC: 204 MG/DL (ref 65–99)
HBV SURFACE AB SER RIA-ACNC: NORMAL
HBV SURFACE AG SERPL QL IA: NORMAL
POTASSIUM SERPL-SCNC: 5.7 MMOL/L (ref 3.5–5.2)
SODIUM SERPL-SCNC: 134 MMOL/L (ref 136–145)
WHOLE BLOOD HOLD SPECIMEN: NORMAL

## 2024-04-03 PROCEDURE — G0378 HOSPITAL OBSERVATION PER HR: HCPCS

## 2024-04-03 PROCEDURE — 87340 HEPATITIS B SURFACE AG IA: CPT | Performed by: STUDENT IN AN ORGANIZED HEALTH CARE EDUCATION/TRAINING PROGRAM

## 2024-04-03 PROCEDURE — 86706 HEP B SURFACE ANTIBODY: CPT | Performed by: STUDENT IN AN ORGANIZED HEALTH CARE EDUCATION/TRAINING PROGRAM

## 2024-04-03 PROCEDURE — 94664 DEMO&/EVAL PT USE INHALER: CPT

## 2024-04-03 PROCEDURE — 94799 UNLISTED PULMONARY SVC/PX: CPT

## 2024-04-03 PROCEDURE — G0257 UNSCHED DIALYSIS ESRD PT HOS: HCPCS

## 2024-04-03 PROCEDURE — 80048 BASIC METABOLIC PNL TOTAL CA: CPT | Performed by: INTERNAL MEDICINE

## 2024-04-03 PROCEDURE — 25010000002 METHYLPREDNISOLONE PER 40 MG: Performed by: INTERNAL MEDICINE

## 2024-04-03 PROCEDURE — 96376 TX/PRO/DX INJ SAME DRUG ADON: CPT

## 2024-04-03 RX ORDER — PREDNISONE 20 MG/1
20 TABLET ORAL DAILY
Qty: 4 TABLET | Refills: 0 | Status: SHIPPED | OUTPATIENT
Start: 2024-04-04 | End: 2024-04-08

## 2024-04-03 RX ORDER — TRAMADOL HYDROCHLORIDE 50 MG/1
50 TABLET ORAL EVERY 8 HOURS PRN
Qty: 60 TABLET | Refills: 0 | Status: SHIPPED | OUTPATIENT
Start: 2024-04-03 | End: 2024-05-03

## 2024-04-03 RX ADMIN — FAMOTIDINE 10 MG: 20 TABLET ORAL at 15:17

## 2024-04-03 RX ADMIN — IPRATROPIUM BROMIDE AND ALBUTEROL SULFATE 3 ML: .5; 3 SOLUTION RESPIRATORY (INHALATION) at 03:29

## 2024-04-03 RX ADMIN — METHYLPREDNISOLONE SODIUM SUCCINATE 40 MG: 40 INJECTION INTRAMUSCULAR; INTRAVENOUS at 15:17

## 2024-04-03 RX ADMIN — LINAGLIPTIN 5 MG: 5 TABLET, FILM COATED ORAL at 15:17

## 2024-04-03 RX ADMIN — IPRATROPIUM BROMIDE AND ALBUTEROL SULFATE 3 ML: .5; 3 SOLUTION RESPIRATORY (INHALATION) at 07:46

## 2024-04-03 RX ADMIN — SEVELAMER CARBONATE 800 MG: 800 TABLET, FILM COATED ORAL at 15:17

## 2024-04-03 RX ADMIN — METHYLPREDNISOLONE SODIUM SUCCINATE 40 MG: 40 INJECTION INTRAMUSCULAR; INTRAVENOUS at 05:41

## 2024-04-03 RX ADMIN — Medication 10 ML: at 15:18

## 2024-04-03 RX ADMIN — HYDROCODONE BITARTRATE AND ACETAMINOPHEN 1 TABLET: 5; 325 TABLET ORAL at 07:47

## 2024-04-03 NOTE — NURSING NOTE
Treatment completed at 1340. Patient ran for 4 hours. 3L was removed.   Patient rested comfortably throughout treatment. Patient was hypertensive but stated she felt fine. Last VS: 173/54 HR 70.     Educated patient on importance of treatment and to not skip any.   Report called to Primary nurse Sera.

## 2024-04-03 NOTE — SIGNIFICANT NOTE
04/03/24 1036   OTHER   Discipline physical therapist   Rehab Time/Intention   Session Not Performed patient unavailable for evaluation  (pt out of room for dialysis)

## 2024-04-03 NOTE — PROGRESS NOTES
LOS: 0 days   Patient Care Team:  Rafael Lyons MD as PCP - General (Internal Medicine)  Regis Mckeon MD as Consulting Physician (Radiation Oncology)  Carlton Casillas MD as Consulting Physician (Gastroenterology)  Susan Marcos APRN as Nurse Practitioner (Gastroenterology)    Chief Complaint: ESRD    Subjective     History of Present Illness  Pt seen on dialysis  Breathing improved.      Subjective:  Symptoms:  No shortness of breath, chest pain, chest pressure or anxiety.    Diet:  No nausea or vomiting.        History taken from: patient chart RN    Objective     Vital Sign Min/Max for last 24 hours  Temp  Min: 97.4 °F (36.3 °C)  Max: 98.4 °F (36.9 °C)   BP  Min: 137/52  Max: 177/64   Pulse  Min: 62  Max: 76   Resp  Min: 8  Max: 18   SpO2  Min: 92 %  Max: 98 %   Flow (L/min)  Min: 2  Max: 3   No data recorded     Flowsheet Rows      Flowsheet Row First Filed Value   Admission Height --   Admission Weight 67.5 kg (148 lb 13 oz) Documented at 04/01/2024 0905            I/O this shift:  In: -   Out: 100 [Urine:100]  I/O last 3 completed shifts:  In: 500 [P.O.:500]  Out: 3500 [Urine:500]    Objective:  General Appearance:  Comfortable.    Vital signs: (most recent): Blood pressure 157/52, pulse 71, temperature 98 °F (36.7 °C), temperature source Oral, resp. rate 18, weight 67.5 kg (148 lb 13 oz), SpO2 98%, not currently breastfeeding.  Vital signs are normal.    HEENT: Normal HEENT exam.    Lungs:  Normal effort and normal respiratory rate.    Heart: Normal rate.  Regular rhythm.    Abdomen: Abdomen is soft.  Bowel sounds are normal.     Extremities: Normal range of motion.    Pulses: Distal pulses are intact.    Neurological: Patient is alert and oriented to person, place and time.    Pupils:  Pupils are equal, round, and reactive to light.    Skin:  Warm and dry.                Results Review:     I reviewed the patient's new clinical results.  I reviewed the patient's new imaging results and  "agree with the interpretation.  I reviewed the patient's other test results and agree with the interpretation    WBC WBC   Date Value Ref Range Status   04/01/2024 10.00 3.40 - 10.80 10*3/mm3 Final      HGB Hemoglobin   Date Value Ref Range Status   04/01/2024 9.1 (L) 12.0 - 15.9 g/dL Final      HCT Hematocrit   Date Value Ref Range Status   04/01/2024 30.8 (L) 34.0 - 46.6 % Final      Platlets No results found for: \"LABPLAT\"   MCV MCV   Date Value Ref Range Status   04/01/2024 100.7 (H) 79.0 - 97.0 fL Final          Sodium Sodium   Date Value Ref Range Status   04/03/2024 134 (L) 136 - 145 mmol/L Final   04/01/2024 137 136 - 145 mmol/L Final      Potassium Potassium   Date Value Ref Range Status   04/03/2024 5.7 (H) 3.5 - 5.2 mmol/L Final     Comment:     Slight hemolysis detected by analyzer. Result may be falsely elevated.   04/01/2024 5.3 (H) 3.5 - 5.2 mmol/L Final      Chloride Chloride   Date Value Ref Range Status   04/03/2024 100 98 - 107 mmol/L Final   04/01/2024 99 98 - 107 mmol/L Final      CO2 CO2   Date Value Ref Range Status   04/03/2024 25.9 22.0 - 29.0 mmol/L Final   04/01/2024 27.5 22.0 - 29.0 mmol/L Final      BUN BUN   Date Value Ref Range Status   04/03/2024 37 (H) 8 - 23 mg/dL Final   04/01/2024 52 (H) 8 - 23 mg/dL Final      Creatinine Creatinine   Date Value Ref Range Status   04/03/2024 2.89 (H) 0.57 - 1.00 mg/dL Final   04/01/2024 4.71 (H) 0.57 - 1.00 mg/dL Final      Calcium Calcium   Date Value Ref Range Status   04/03/2024 8.0 (L) 8.6 - 10.5 mg/dL Final   04/01/2024 8.0 (L) 8.6 - 10.5 mg/dL Final      PO4 No results found for: \"CAPO4\"   Albumin Albumin   Date Value Ref Range Status   04/01/2024 3.5 3.5 - 5.2 g/dL Final      Magnesium No results found for: \"MG\"   Uric Acid No results found for: \"URICACID\"     Medication Review:   carvedilol, 25 mg, Oral, BID With Meals  famotidine, 10 mg, Oral, Daily  hydrALAZINE, 25 mg, Oral, Q12H  ipratropium-albuterol, 3 mL, Nebulization, Q4H - " RT  linagliptin, 5 mg, Oral, Daily  methylPREDNISolone sodium succinate, 40 mg, Intravenous, Q8H  rosuvastatin, 20 mg, Oral, Nightly  sevelamer, 800 mg, Oral, TID With Meals  sodium chloride, 10 mL, Intravenous, Q12H          Assessment & Plan       ESRD (end stage renal disease)      Assessment & Plan  ESRD-  normal dialysis m/w/f with LUE AVF.  Normal dialysis at [facility].  OK for d/c from renal standpoint.  COPD- acute exacerbation.  Improved with steroids.  Anemia of CKD-  slightly below goal.  On mircera outpt.  HTN-  continue home meds.  DMII-  diabetic diet.  Hyperphosphatemia-  renal diet.  Renvela with meals.    Elliott Aviles MD  04/03/24  09:16 EDT

## 2024-04-03 NOTE — DISCHARGE SUMMARY
[facility]         DISCHARGE SUMMARY    Patient Name: Charlette Rai  : 1935  MRN: [id number]    Date of Admission: 2024  Date of Discharge: 3 April 2024  Primary Care Physician: Rafael Lyons MD    Consults       Date and Time Order Name Status Description    2024  6:22 PM Inpatient Nephrology Consult      2024  3:44 PM General MD Inpatient Consult      2024  2:39 PM General MD Inpatient Consult      3/13/2024 12:35 PM Inpatient Nephrology Consult Completed     3/8/2024  8:44 AM Inpatient General Surgery Consult Completed     3/3/2024  4:39 PM Inpatient Urology Consult Completed     3/3/2024  4:39 PM Inpatient Nephrology Consult Completed             Presenting Problem:   Acute exacerbation of chronic obstructive pulmonary disease (COPD) [J44.1]  COPD exacerbation [J44.1]  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 female admitted to hospital for increasing shortness of breath.  Patient had some declotting procedure done for her fistula at [place], I do not have any records.  Nephrologist was involved.  Patient missed her dialysis on Monday.  Patient's last dialysis was on Friday.  Patient was slightly overloaded with volume.  She also had some increasing shortness of breath and wheezing.  She was started on nebulizers along with steroids.  She was seen by nephrologist Dr. Schofield who performed dialysis.    Patient is feeling better she had dialysis 2 days straight.  Her labs are stable.  Her breathing has significantly improved.  She was in the hospital recently at [facility] for spontaneous bleeding with  hematoma formation on the right kidney right subcapsuar which was managed conservatively.    Patient is feeling much better.  She was on tramadol for pain she requesting tramadol at the time of discharge.  She will be discharged home postdialysis.  I discussed with patient's daughter and she will pick her up later.        DISCHARGE Follow Up Recommendations for labs and diagnostics:   Patient stable ready for discharge.  Advised to take medications as prescribed on discharge.  Recommended follow-up with consultants as advised.  Patient advised to return to ER if symptoms get worse.  Patient advised to follow-up with me/PCP post discharge in 1 week.      Day of Discharge     Vital Signs:  Temp:  [97.4 °F (36.3 °C)-98.4 °F (36.9 °C)] 97.5 °F (36.4 °C)  Heart Rate:  [62-76] 67  Resp:  [8-18] 16  BP: (137-177)/(47-78) 174/54  Flow (L/min):  [2-3] 2    Physical Exam:    Elderly female not in acute distress.  Heart regular.  Lungs occasional crackles.  Abdomen soft.  Extremities no edema.  Neuro awake alert and oriented      Pertinent  and/or Most Recent Results     LAB RESULTS:      Lab 04/01/24  0926   WBC 10.00   HEMOGLOBIN 9.1*   HEMATOCRIT 30.8*   PLATELETS 187   NEUTROS ABS 7.93*   IMMATURE GRANS (ABS) 0.03   LYMPHS ABS 0.80   MONOS ABS 1.00*   EOS ABS 0.19   .7*         Lab 04/03/24  0608 04/01/24  0926   SODIUM 134* 137   POTASSIUM 5.7* 5.3*   CHLORIDE 100 99   CO2 25.9 27.5   ANION GAP 8.1 10.5   BUN 37* 52*   CREATININE 2.89* 4.71*   EGFR 15.3* 8.5*   GLUCOSE 204* 127*   CALCIUM 8.0* 8.0*         Lab 04/01/24  0926   TOTAL PROTEIN 6.2   ALBUMIN 3.5   GLOBULIN 2.7   ALT (SGPT) 15   AST (SGOT) 18   BILIRUBIN 0.3   ALK PHOS 186*         Lab 04/01/24  1139 04/01/24  0926   PROBNP  --  22,649.0*   HSTROP T 67* 67*                 Lab 04/01/24  1126   PH, ARTERIAL 7.374   PCO2, ARTERIAL 42.8   PO2 ART 71.3*   O2 SATURATION ART 93.8*   HCO3 ART 24.4   BASE EXCESS ART -0.8   CARBOXYHEMOGLOBIN 0.7     Brief Urine  Lab Results  (Last result in the past 365 days)        Color   Clarity   Blood   Leuk Est   Nitrite   Protein   CREAT   Urine HCG        03/03/24 1131 Yellow   Turbid   Moderate (2+)   Moderate (2+)   Negative   >=300 mg/dL (3+)                 Microbiology Results (last 10 days)       Procedure Component Value - Date/Time    COVID-19, FLU A/B, RSV PCR 1 HR TAT - Swab, Nasopharynx [[id number]]  (Normal) Collected: 04/01/24 1131    Lab Status: Final result Specimen: Swab from Nasopharynx Updated: 04/01/24 1315     COVID19 Not Detected     Influenza A PCR Not Detected     Influenza B PCR Not Detected     RSV, PCR Not Detected    Narrative:      Fact sheet for providers: https://www.fda.gov/media/009096/download    Fact sheet for patients: https://www.fda.gov/media/406733/download    Test performed by PCR.            PROCEDURES:    XR Chest 1 View    Result Date: 4/1/2024  Impression: IMPRESSION :  1. Mild prominence of the interstitial markings again noted slightly increased in the comparison. Findings represent underlying interstitial edema given findings on recent CT. Superimposed pneumonia is not excluded. [  Electronically Signed By-Eleazar Burris On:4/1/2024 9:20 AM      CT Abdomen Pelvis Without Contrast    Result Date: 3/14/2024  Impression:   1. No significant change in right-sided perinephric/subcapsular hematoma. 2. Large amount of stool in the transverse and ascending colon which may relate to constipation. 3. Stable small right pleural effusion. 4. Cholelithiasis and additional chronic findings above. 1.    NAJMA HOGAN MD       Electronically Signed and Approved By: NAJMA HOGAN MD on 3/14/2024 at 16:12             CT Chest Without Contrast Diagnostic    Result Date: 3/13/2024  Impression:   1. Interlobular septal thickening suggesting pulmonary edema at right lower lobe with new small right pleural effusion. 2. Stable postsurgical changes of left upper lobectomy with pleural plaques in the left  hemithorax and chronic left basilar scarring. 3. Stable posttreatment scarring involving the right upper lobe extending to the right hilum. 4. Perinephric/subcapsular hematoma at right kidney similar to recent abdominal CT. 5. Additional chronic findings above.     NAJMA HOGAN MD       Electronically Signed and Approved By: NAJMA HOGAN MD on 3/13/2024 at 12:01             XR Chest 1 View    Result Date: 3/13/2024  Impression:    1. Interval improvement in right basilar airspace and interstitial opacities.  Trace right effusion.  2. Stable chronic changes and volume loss in the left thorax.        LINDSEY PACHECO MD       Electronically Signed and Approved By: LINDSEY PACHECO MD on 3/13/2024 at 9:41             CT Abdomen Pelvis Without Contrast    Result Date: 3/6/2024  Impression: 1. Right renal subcapsular hematoma measures up to 3.3 cm in thickness which is similar to that described on the CT abdomen and pelvis report 3 days ago. 2. 3.4 cm exophytic masslike density right lower pole suspicious for a solid renal mass and is a potential source for hematoma. Recommend further evaluation with CT or MRI with and without contrast to evaluate for a solid lesion. At that time, additional hyperdense bilateral renal smaller lesions could be evaluated. 3. Cholelithiasis. 4. Moderate stool throughout the colon consistent with constipation. 5. Chronic left lung base pleural parenchymal scarring with calcification.  Radiation dose reduction techniques were utilized, including automated exposure control and exposure modulation based on body size.   This report was finalized on 3/6/2024 3:16 PM by Dr. Naseem Mancera M.D on Workstation: [id number]       Results for orders placed during the hospital encounter of 03/13/24    Duplex Venous Lower Extremity - Right CAR    Interpretation Summary    Normal right lower extremity venous duplex scan.      Results for orders placed during the hospital encounter of 03/13/24    Duplex Venous  Lower Extremity - Right CAR    Interpretation Summary    Normal right lower extremity venous duplex scan.      Results for orders placed during the hospital encounter of 10/29/23    Adult Transthoracic Echo Complete W/ Cont if Necessary Per Protocol    Interpretation Summary    Left ventricular systolic function is normal. Calculated left ventricular EF = 56.8%    Left ventricular wall thickness is consistent with mild asymmetric hypertrophy.    Left ventricular diastolic function is consistent with (grade I) impaired relaxation.    Left atrial volume is moderately increased.    Moderate aortic valve stenosis is present.    Estimated right ventricular systolic pressure from tricuspid regurgitation is normal (<35 mmHg).    Mild dilation of the aortic root is present.      Labs Pending at Discharge:  Pending Labs       Order Current Status    Hepatitis B Surface Antibody In process    Hepatitis B Surface Antigen In process              Discharge Details        Discharge Medications        New Medications        Instructions Start Date   predniSONE 20 MG tablet  Commonly known as: DELTASONE   20 mg, Oral, Daily   Start Date: April 4, 2024     traMADol 50 MG tablet  Commonly known as: ULTRAM   50 mg, Oral, Every 8 Hours PRN             Changes to Medications        Instructions Start Date   levocetirizine 5 MG tablet  Commonly known as: XYZAL  What changed: when to take this   TAKE 1 TABLET DAILY             Continue These Medications        Instructions Start Date   albuterol sulfate  (90 Base) MCG/ACT inhaler  Commonly known as: PROVENTIL HFA;VENTOLIN HFA;PROAIR HFA   2 puffs, Inhalation, Every 4 Hours PRN      budesonide-formoterol 160-4.5 MCG/ACT inhaler  Commonly known as: SYMBICORT   2 puffs, Inhalation, 2 Times Daily - RT      carvedilol 25 MG tablet  Commonly known as: Coreg   25 mg, Oral, 2 Times Daily With Meals      dexlansoprazole 60 MG capsule  Commonly known as: DEXILANT   60 mg, Oral, Daily       ergocalciferol 1.25 MG (85597 UT) capsule  Commonly known as: ERGOCALCIFEROL   50,000 Units, Oral, Every 14 Days      hydrALAZINE 25 MG tablet  Commonly known as: APRESOLINE   25 mg, Oral, Every 12 Hours Scheduled      linagliptin 5 MG tablet tablet  Commonly known as: Tradjenta   5 mg, Oral, Daily      nitroglycerin 0.4 MG SL tablet  Commonly known as: NITROSTAT   Place 1 tablet under the tongue Every 5 (Five) Minutes As Needed for Chest Pain.      polycarbophil 625 MG tablet tablet   625 mg, Oral, Daily PRN      rosuvastatin 20 MG tablet  Commonly known as: CRESTOR   20 mg, Oral, Daily      sevelamer 800 MG tablet  Commonly known as: RENVELA   800 mg, Oral, 3 Times Daily With Meals               No Known Allergies      Discharge Disposition:    Home or Self Care    Diet:    Heart healthy renal diet    Discharge Activity:     Activity Instructions       Activity as Tolerated              Future Appointments   Date Time Provider Department Center   5/16/2024  3:15 PM Jazzy Addison APRN [facility]       Additional Instructions for the Follow-ups that You Need to Schedule       Discharge Follow-up with PCP   As directed       Currently Documented PCP:    Rafael Lyons MD    PCP Phone Number:    [phone]     Follow Up Details: next week        Discharge Follow-up with Specified Provider: Nephrologist Dr. García[facility] group for hemodialysis as recommended   As directed      To: Nephrologist Dr. García[facility] group for hemodialysis as recommended                Time spent on Discharge including face to face service: 31 minutes.            I have dictated this note utilizing Dragon Dictation.             Please note that portions of this note were completed with a voice recognition program.             Part of this note may be an electronic transcription/translation of spoken language to printed text         using the Dragon Dictation System.       Electronically signed by Rafael yLons MD, 04/03/24, 9:09 AM  EDT.

## 2024-04-03 NOTE — PLAN OF CARE
Goal Outcome Evaluation:              Outcome Evaluation: VSS, patient has slept well tonight. No c/o pain. Pt will be having dialysis this AM.

## 2024-04-03 NOTE — PLAN OF CARE
Problem: Adult Inpatient Plan of Care  Goal: Plan of Care Review  Outcome: Met  Goal: Patient-Specific Goal (Individualized)  Outcome: Met  Goal: Absence of Hospital-Acquired Illness or Injury  Outcome: Met  Intervention: Identify and Manage Fall Risk  Recent Flowsheet Documentation  Taken 4/3/2024 0755 by Sera Sifuentes RN  Safety Promotion/Fall Prevention: safety round/check completed  Intervention: Prevent and Manage VTE (Venous Thromboembolism) Risk  Recent Flowsheet Documentation  Taken 4/3/2024 0755 by Sera Sifuentes RN  Activity Management:   activity encouraged   bedrest  Range of Motion: active ROM (range of motion) encouraged  Goal: Optimal Comfort and Wellbeing  Outcome: Met  Intervention: Monitor Pain and Promote Comfort  Recent Flowsheet Documentation  Taken 4/3/2024 0755 by Sera Sifuentes RN  Pain Management Interventions:   see MAR   quiet environment facilitated   care clustered  Intervention: Provide Person-Centered Care  Recent Flowsheet Documentation  Taken 4/3/2024 0755 by Sera Sifuentes RN  Trust Relationship/Rapport:   care explained   choices provided   emotional support provided   empathic listening provided   questions answered   questions encouraged   reassurance provided   thoughts/feelings acknowledged  Goal: Readiness for Transition of Care  Outcome: Met     Problem: Gas Exchange Impaired  Goal: Optimal Gas Exchange  Outcome: Met     Problem: Asthma Comorbidity  Goal: Maintenance of Asthma Control  Outcome: Met  Intervention: Maintain Asthma Symptom Control  Recent Flowsheet Documentation  Taken 4/3/2024 0755 by Sera Sifuentes RN  Medication Review/Management: medications reviewed     Problem: COPD (Chronic Obstructive Pulmonary Disease) Comorbidity  Goal: Maintenance of COPD Symptom Control  Outcome: Met  Intervention: Maintain COPD-Symptom Control  Recent Flowsheet Documentation  Taken 4/3/2024 0755 by Sera Siufentes RN  Medication Review/Management: medications reviewed     Problem:  Heart Failure Comorbidity  Goal: Maintenance of Heart Failure Symptom Control  Outcome: Met  Intervention: Maintain Heart Failure-Management  Recent Flowsheet Documentation  Taken 4/3/2024 0755 by Sera Sifuentes RN  Medication Review/Management: medications reviewed     Problem: Hypertension Comorbidity  Goal: Blood Pressure in Desired Range  Outcome: Met  Intervention: Maintain Blood Pressure Management  Recent Flowsheet Documentation  Taken 4/3/2024 0755 by Sera Sifuentes RN  Medication Review/Management: medications reviewed     Problem: Osteoarthritis Comorbidity  Goal: Maintenance of Osteoarthritis Symptom Control  Outcome: Met  Intervention: Maintain Osteoarthritis Symptom Control  Recent Flowsheet Documentation  Taken 4/3/2024 0755 by Sera Sifuentes RN  Activity Management:   activity encouraged   bedrest  Medication Review/Management: medications reviewed     Problem: Pain Chronic (Persistent) (Comorbidity Management)  Goal: Acceptable Pain Control and Functional Ability  Outcome: Met  Intervention: Manage Persistent Pain  Recent Flowsheet Documentation  Taken 4/3/2024 0755 by Sera Sifuentes RN  Medication Review/Management: medications reviewed  Intervention: Develop Pain Management Plan  Recent Flowsheet Documentation  Taken 4/3/2024 0755 by Sera Sifuentes RN  Pain Management Interventions:   see MAR   quiet environment facilitated   care clustered  Intervention: Optimize Psychosocial Wellbeing  Recent Flowsheet Documentation  Taken 4/3/2024 0755 by Sera Sifuentes RN  Diversional Activities: television  Family/Support System Care: involvement promoted     Problem: Fall Injury Risk  Goal: Absence of Fall and Fall-Related Injury  Outcome: Met  Intervention: Identify and Manage Contributors  Recent Flowsheet Documentation  Taken 4/3/2024 0755 by Sera Sifuentes RN  Medication Review/Management: medications reviewed  Intervention: Promote Injury-Free Environment  Recent Flowsheet Documentation  Taken 4/3/2024  0755 by Sera Sifuentes, RN  Safety Promotion/Fall Prevention: safety round/check completed   Goal Outcome Evaluation:

## 2024-04-04 NOTE — OUTREACH NOTE
Prep Survey      Flowsheet Row Responses   [employer] facility patient discharged from? [facility]   Is LACE score < 7 ? No   Eligibility Readm Mgmt   Discharge diagnosis COPD exacerbation, ESRD on HD   Does the patient have one of the following disease processes/diagnoses(primary or secondary)? COPD   Does the patient have Home health ordered? No   Is there a DME ordered? No   Prep survey completed? Yes            Tammy BHAKTA - Registered Nurse

## 2024-04-09 ENCOUNTER — READMISSION MANAGEMENT (OUTPATIENT)
Dept: CALL CENTER | Facility: HOSPITAL | Age: 89
End: 2024-04-09
Payer: MEDICARE

## 2024-04-09 NOTE — OUTREACH NOTE
COPD/PN Week 1 Survey      Flowsheet Row Responses   [facility] patient discharged from? [facility]   Does the patient have one of the following disease processes/diagnoses(primary or secondary)? COPD   Week 1 attempt successful? Yes   Call start time 1713   Call end time 1720   Discharge diagnosis COPD exacerbation, ESRD on HD, volume overload   Person spoke with today (if not patient) and relationship Patient   Meds reviewed with patient/caregiver? Yes   Does the patient have all medications ordered at discharge? Yes   Is the patient taking all medications as directed (includes completed medication regime)? Yes   Does the patient have a primary care provider?  Yes   Does the patient have an appointment with their PCP or specialist within 7 days of discharge? Yes   Comments regarding PCP Patient reports has had follow up with PCP   Has the patient kept scheduled appointments due by today? Yes  [Patient also going to dialysis]   Has home health visited the patient within 72 hours of discharge? N/A   DME comments Patient reports that she wears O2 at night and uses a home nebulizer   Pulse Ox monitoring None   Psychosocial issues? No   Did the patient receive a copy of their discharge instructions? Yes   Nursing interventions Reviewed instructions with patient   What is the patient's perception of their health status since discharge? Improving   If the patient is a current smoker, are they able to teach back resources for cessation? Not a smoker  [Patient reports quit 20 years ago]   Is the patient/caregiver able to teach back the hierarchy of who to call/visit for symptoms/problems? PCP, Specialist, Home health nurse, Urgent Care, ED, 911 Yes   Is the patient able to teach back COPD zones? No   Nursing interventions Education provided on various zones   Patient reports what zone on this call? Yellow Zone   Yellow Zone Reports having a bad day or a COPD flare, More breathless than usual, I have less energy for my  daily activities   Yellow interventions Continue taking daily medications, Use quick relief inhaler, Use oxygen as prescribed, Get plenty of rest, Call provider immediately if symptoms don't improve: they may indicate that an adjustment in medication or oxygen therapy is needed   Week 1 call completed? Yes   Is the patient interested in additional calls from an ambulatory ? No   Would this patient benefit from a Referral to [facility]? No   Call end time 1720            DIDI ELKINS - Registered Nurse

## 2024-04-14 ENCOUNTER — HOSPITAL ENCOUNTER (OUTPATIENT)
Facility: HOSPITAL | Age: 89
Setting detail: OBSERVATION
Discharge: HOME | End: 2024-04-17
Attending: EMERGENCY MEDICINE | Admitting: INTERNAL MEDICINE
Payer: MEDICARE

## 2024-04-14 ENCOUNTER — APPOINTMENT (OUTPATIENT)
Dept: GENERAL RADIOLOGY | Facility: HOSPITAL | Age: 89
End: 2024-04-14
Payer: MEDICARE

## 2024-04-14 DIAGNOSIS — E87.70 HYPERVOLEMIA, UNSPECIFIED HYPERVOLEMIA TYPE: Primary | ICD-10-CM

## 2024-04-14 DIAGNOSIS — J40 BRONCHITIS: ICD-10-CM

## 2024-04-14 DIAGNOSIS — R26.2 DIFFICULTY WALKING: ICD-10-CM

## 2024-04-14 DIAGNOSIS — I50.9 ACUTE ON CHRONIC CONGESTIVE HEART FAILURE, UNSPECIFIED HEART FAILURE TYPE: ICD-10-CM

## 2024-04-14 LAB
ALBUMIN SERPL-MCNC: 3.4 G/DL (ref 3.5–5.2)
ALBUMIN/GLOB SERPL: 1.5 G/DL
ALP SERPL-CCNC: 141 U/L (ref 39–117)
ALT SERPL W P-5'-P-CCNC: 11 U/L (ref 1–33)
ANION GAP SERPL CALCULATED.3IONS-SCNC: 10.1 MMOL/L (ref 5–15)
AST SERPL-CCNC: 18 U/L (ref 1–32)
BASOPHILS # BLD AUTO: 0.02 10*3/MM3 (ref 0–0.2)
BASOPHILS NFR BLD AUTO: 0.3 % (ref 0–1.5)
BILIRUB SERPL-MCNC: 0.3 MG/DL (ref 0–1.2)
BUN SERPL-MCNC: 43 MG/DL (ref 8–23)
BUN/CREAT SERPL: 14.5 (ref 7–25)
CALCIUM SPEC-SCNC: 8.1 MG/DL (ref 8.6–10.5)
CHLORIDE SERPL-SCNC: 106 MMOL/L (ref 98–107)
CO2 SERPL-SCNC: 23.9 MMOL/L (ref 22–29)
CREAT SERPL-MCNC: 2.97 MG/DL (ref 0.57–1)
DEPRECATED RDW RBC AUTO: 53.6 FL (ref 37–54)
EGFRCR SERPLBLD CKD-EPI 2021: 14.8 ML/MIN/1.73
EOSINOPHIL # BLD AUTO: 0.22 10*3/MM3 (ref 0–0.4)
EOSINOPHIL NFR BLD AUTO: 2.9 % (ref 0.3–6.2)
ERYTHROCYTE [DISTWIDTH] IN BLOOD BY AUTOMATED COUNT: 14.7 % (ref 12.3–15.4)
GLOBULIN UR ELPH-MCNC: 2.3 GM/DL
GLUCOSE SERPL-MCNC: 114 MG/DL (ref 65–99)
HCT VFR BLD AUTO: 27.7 % (ref 34–46.6)
HGB BLD-MCNC: 8.2 G/DL (ref 12–15.9)
HOLD SPECIMEN: NORMAL
HOLD SPECIMEN: NORMAL
IMM GRANULOCYTES # BLD AUTO: 0.02 10*3/MM3 (ref 0–0.05)
IMM GRANULOCYTES NFR BLD AUTO: 0.3 % (ref 0–0.5)
LYMPHOCYTES # BLD AUTO: 0.69 10*3/MM3 (ref 0.7–3.1)
LYMPHOCYTES NFR BLD AUTO: 9.1 % (ref 19.6–45.3)
MCH RBC QN AUTO: 29.6 PG (ref 26.6–33)
MCHC RBC AUTO-ENTMCNC: 29.6 G/DL (ref 31.5–35.7)
MCV RBC AUTO: 100 FL (ref 79–97)
MONOCYTES # BLD AUTO: 0.76 10*3/MM3 (ref 0.1–0.9)
MONOCYTES NFR BLD AUTO: 10 % (ref 5–12)
NEUTROPHILS NFR BLD AUTO: 5.87 10*3/MM3 (ref 1.7–7)
NEUTROPHILS NFR BLD AUTO: 77.4 % (ref 42.7–76)
NRBC BLD AUTO-RTO: 0 /100 WBC (ref 0–0.2)
NT-PROBNP SERPL-MCNC: ABNORMAL PG/ML (ref 0–1800)
PLATELET # BLD AUTO: 95 10*3/MM3 (ref 140–450)
PMV BLD AUTO: 10.2 FL (ref 6–12)
POTASSIUM SERPL-SCNC: 4.8 MMOL/L (ref 3.5–5.2)
PROT SERPL-MCNC: 5.7 G/DL (ref 6–8.5)
RBC # BLD AUTO: 2.77 10*6/MM3 (ref 3.77–5.28)
SODIUM SERPL-SCNC: 140 MMOL/L (ref 136–145)
TROPONIN T SERPL HS-MCNC: 64 NG/L
WBC NRBC COR # BLD AUTO: 7.58 10*3/MM3 (ref 3.4–10.8)
WHOLE BLOOD HOLD COAG: NORMAL
WHOLE BLOOD HOLD SPECIMEN: NORMAL

## 2024-04-14 PROCEDURE — 93005 ELECTROCARDIOGRAM TRACING: CPT

## 2024-04-14 PROCEDURE — 94640 AIRWAY INHALATION TREATMENT: CPT

## 2024-04-14 PROCEDURE — 85025 COMPLETE CBC W/AUTO DIFF WBC: CPT

## 2024-04-14 PROCEDURE — 94799 UNLISTED PULMONARY SVC/PX: CPT

## 2024-04-14 PROCEDURE — G0378 HOSPITAL OBSERVATION PER HR: HCPCS

## 2024-04-14 PROCEDURE — 84484 ASSAY OF TROPONIN QUANT: CPT | Performed by: EMERGENCY MEDICINE

## 2024-04-14 PROCEDURE — 80053 COMPREHEN METABOLIC PANEL: CPT | Performed by: EMERGENCY MEDICINE

## 2024-04-14 PROCEDURE — 93005 ELECTROCARDIOGRAM TRACING: CPT | Performed by: EMERGENCY MEDICINE

## 2024-04-14 PROCEDURE — 71045 X-RAY EXAM CHEST 1 VIEW: CPT

## 2024-04-14 PROCEDURE — 99285 EMERGENCY DEPT VISIT HI MDM: CPT

## 2024-04-14 PROCEDURE — 83880 ASSAY OF NATRIURETIC PEPTIDE: CPT

## 2024-04-14 RX ORDER — SODIUM CHLORIDE 0.9 % (FLUSH) 0.9 %
10 SYRINGE (ML) INJECTION AS NEEDED
Status: DISCONTINUED | OUTPATIENT
Start: 2024-04-14 | End: 2024-04-17 | Stop reason: HOSPADM

## 2024-04-14 RX ORDER — IPRATROPIUM BROMIDE AND ALBUTEROL SULFATE 2.5; .5 MG/3ML; MG/3ML
3 SOLUTION RESPIRATORY (INHALATION) ONCE
Status: COMPLETED | OUTPATIENT
Start: 2024-04-14 | End: 2024-04-14

## 2024-04-14 RX ADMIN — IPRATROPIUM BROMIDE AND ALBUTEROL SULFATE 3 ML: .5; 3 SOLUTION RESPIRATORY (INHALATION) at 23:41

## 2024-04-15 PROBLEM — E87.70 VOLUME OVERLOAD: Status: ACTIVE | Noted: 2024-04-15

## 2024-04-15 LAB
ALBUMIN SERPL-MCNC: 3.2 G/DL (ref 3.5–5.2)
ALBUMIN/GLOB SERPL: 1.3 G/DL
ALP SERPL-CCNC: 149 U/L (ref 39–117)
ALT SERPL W P-5'-P-CCNC: 11 U/L (ref 1–33)
ANION GAP SERPL CALCULATED.3IONS-SCNC: 12.8 MMOL/L (ref 5–15)
AST SERPL-CCNC: 15 U/L (ref 1–32)
BILIRUB SERPL-MCNC: 0.2 MG/DL (ref 0–1.2)
BUN SERPL-MCNC: 49 MG/DL (ref 8–23)
BUN/CREAT SERPL: 16.4 (ref 7–25)
CALCIUM SPEC-SCNC: 8 MG/DL (ref 8.6–10.5)
CHLORIDE SERPL-SCNC: 104 MMOL/L (ref 98–107)
CO2 SERPL-SCNC: 22.2 MMOL/L (ref 22–29)
CREAT SERPL-MCNC: 2.99 MG/DL (ref 0.57–1)
D-LACTATE SERPL-SCNC: 0.6 MMOL/L (ref 0.5–2)
DEPRECATED RDW RBC AUTO: 55 FL (ref 37–54)
EGFRCR SERPLBLD CKD-EPI 2021: 14.7 ML/MIN/1.73
ERYTHROCYTE [DISTWIDTH] IN BLOOD BY AUTOMATED COUNT: 14.6 % (ref 12.3–15.4)
GEN 5 2HR TROPONIN T REFLEX: 64 NG/L
GLOBULIN UR ELPH-MCNC: 2.5 GM/DL
GLUCOSE SERPL-MCNC: 208 MG/DL (ref 65–99)
HCT VFR BLD AUTO: 29.5 % (ref 34–46.6)
HGB BLD-MCNC: 8.5 G/DL (ref 12–15.9)
MCH RBC QN AUTO: 29.7 PG (ref 26.6–33)
MCHC RBC AUTO-ENTMCNC: 28.8 G/DL (ref 31.5–35.7)
MCV RBC AUTO: 103.1 FL (ref 79–97)
PLATELET # BLD AUTO: 99 10*3/MM3 (ref 140–450)
PMV BLD AUTO: 10.1 FL (ref 6–12)
POTASSIUM SERPL-SCNC: 4.9 MMOL/L (ref 3.5–5.2)
PROT SERPL-MCNC: 5.7 G/DL (ref 6–8.5)
RBC # BLD AUTO: 2.86 10*6/MM3 (ref 3.77–5.28)
SODIUM SERPL-SCNC: 139 MMOL/L (ref 136–145)
TROPONIN T DELTA: 0 NG/L
WBC NRBC COR # BLD AUTO: 5.95 10*3/MM3 (ref 3.4–10.8)

## 2024-04-15 PROCEDURE — G0257 UNSCHED DIALYSIS ESRD PT HOS: HCPCS

## 2024-04-15 PROCEDURE — 96365 THER/PROPH/DIAG IV INF INIT: CPT

## 2024-04-15 PROCEDURE — 80053 COMPREHEN METABOLIC PANEL: CPT | Performed by: INTERNAL MEDICINE

## 2024-04-15 PROCEDURE — 25010000002 HEPARIN (PORCINE) PER 1000 UNITS: Performed by: INTERNAL MEDICINE

## 2024-04-15 PROCEDURE — 25010000002 CEFTRIAXONE PER 250 MG: Performed by: EMERGENCY MEDICINE

## 2024-04-15 PROCEDURE — 94664 DEMO&/EVAL PT USE INHALER: CPT

## 2024-04-15 PROCEDURE — 84484 ASSAY OF TROPONIN QUANT: CPT | Performed by: EMERGENCY MEDICINE

## 2024-04-15 PROCEDURE — G0378 HOSPITAL OBSERVATION PER HR: HCPCS

## 2024-04-15 PROCEDURE — 36415 COLL VENOUS BLD VENIPUNCTURE: CPT

## 2024-04-15 PROCEDURE — 85027 COMPLETE CBC AUTOMATED: CPT | Performed by: INTERNAL MEDICINE

## 2024-04-15 PROCEDURE — 97161 PT EVAL LOW COMPLEX 20 MIN: CPT

## 2024-04-15 PROCEDURE — 94799 UNLISTED PULMONARY SVC/PX: CPT

## 2024-04-15 PROCEDURE — 87040 BLOOD CULTURE FOR BACTERIA: CPT | Performed by: EMERGENCY MEDICINE

## 2024-04-15 PROCEDURE — 83605 ASSAY OF LACTIC ACID: CPT | Performed by: EMERGENCY MEDICINE

## 2024-04-15 RX ORDER — ONDANSETRON 2 MG/ML
4 INJECTION INTRAMUSCULAR; INTRAVENOUS EVERY 6 HOURS PRN
Status: DISCONTINUED | OUTPATIENT
Start: 2024-04-15 | End: 2024-04-17 | Stop reason: HOSPADM

## 2024-04-15 RX ORDER — AMOXICILLIN 250 MG
2 CAPSULE ORAL 2 TIMES DAILY
Status: DISCONTINUED | OUTPATIENT
Start: 2024-04-15 | End: 2024-04-17 | Stop reason: HOSPADM

## 2024-04-15 RX ORDER — ACETAMINOPHEN 325 MG/1
650 TABLET ORAL EVERY 4 HOURS PRN
Status: DISCONTINUED | OUTPATIENT
Start: 2024-04-15 | End: 2024-04-17 | Stop reason: HOSPADM

## 2024-04-15 RX ORDER — CALCIUM POLYCARBOPHIL 625 MG
625 TABLET ORAL DAILY PRN
Status: DISCONTINUED | OUTPATIENT
Start: 2024-04-15 | End: 2024-04-17 | Stop reason: HOSPADM

## 2024-04-15 RX ORDER — MANNITOL 20 G/100ML
12.5 INJECTION, SOLUTION INTRAVENOUS
Status: DISCONTINUED | OUTPATIENT
Start: 2024-04-15 | End: 2024-04-17 | Stop reason: HOSPADM

## 2024-04-15 RX ORDER — BISACODYL 10 MG
10 SUPPOSITORY, RECTAL RECTAL DAILY PRN
Status: DISCONTINUED | OUTPATIENT
Start: 2024-04-15 | End: 2024-04-17 | Stop reason: HOSPADM

## 2024-04-15 RX ORDER — SODIUM CHLORIDE 9 MG/ML
40 INJECTION, SOLUTION INTRAVENOUS AS NEEDED
Status: DISCONTINUED | OUTPATIENT
Start: 2024-04-15 | End: 2024-04-17 | Stop reason: HOSPADM

## 2024-04-15 RX ORDER — ROSUVASTATIN CALCIUM 20 MG/1
20 TABLET, COATED ORAL NIGHTLY
Status: DISCONTINUED | OUTPATIENT
Start: 2024-04-15 | End: 2024-04-17 | Stop reason: HOSPADM

## 2024-04-15 RX ORDER — PANTOPRAZOLE SODIUM 40 MG/1
40 TABLET, DELAYED RELEASE ORAL
Status: DISCONTINUED | OUTPATIENT
Start: 2024-04-15 | End: 2024-04-17 | Stop reason: HOSPADM

## 2024-04-15 RX ORDER — ERGOCALCIFEROL 1.25 MG/1
50000 CAPSULE ORAL
Status: DISCONTINUED | OUTPATIENT
Start: 2024-04-15 | End: 2024-04-17 | Stop reason: HOSPADM

## 2024-04-15 RX ORDER — DULAGLUTIDE 1.5 MG/.5ML
1.5 INJECTION, SOLUTION SUBCUTANEOUS
COMMUNITY

## 2024-04-15 RX ORDER — ALBUTEROL SULFATE 2.5 MG/3ML
2.5 SOLUTION RESPIRATORY (INHALATION) EVERY 6 HOURS PRN
Status: DISCONTINUED | OUTPATIENT
Start: 2024-04-15 | End: 2024-04-17 | Stop reason: HOSPADM

## 2024-04-15 RX ORDER — POLYETHYLENE GLYCOL 3350 17 G/17G
17 POWDER, FOR SOLUTION ORAL DAILY PRN
Status: DISCONTINUED | OUTPATIENT
Start: 2024-04-15 | End: 2024-04-17 | Stop reason: HOSPADM

## 2024-04-15 RX ORDER — CARVEDILOL 25 MG/1
25 TABLET ORAL 2 TIMES DAILY WITH MEALS
Status: DISCONTINUED | OUTPATIENT
Start: 2024-04-15 | End: 2024-04-17 | Stop reason: HOSPADM

## 2024-04-15 RX ORDER — BISACODYL 5 MG/1
5 TABLET, DELAYED RELEASE ORAL DAILY PRN
Status: DISCONTINUED | OUTPATIENT
Start: 2024-04-15 | End: 2024-04-17 | Stop reason: HOSPADM

## 2024-04-15 RX ORDER — ALUMINA, MAGNESIA, AND SIMETHICONE 2400; 2400; 240 MG/30ML; MG/30ML; MG/30ML
15 SUSPENSION ORAL EVERY 6 HOURS PRN
Status: DISCONTINUED | OUTPATIENT
Start: 2024-04-15 | End: 2024-04-17 | Stop reason: HOSPADM

## 2024-04-15 RX ORDER — CETIRIZINE HYDROCHLORIDE 10 MG/1
10 TABLET ORAL DAILY
Status: DISCONTINUED | OUTPATIENT
Start: 2024-04-15 | End: 2024-04-17 | Stop reason: HOSPADM

## 2024-04-15 RX ORDER — BUDESONIDE AND FORMOTEROL FUMARATE DIHYDRATE 160; 4.5 UG/1; UG/1
2 AEROSOL RESPIRATORY (INHALATION)
Status: DISCONTINUED | OUTPATIENT
Start: 2024-04-15 | End: 2024-04-17 | Stop reason: HOSPADM

## 2024-04-15 RX ORDER — TRAMADOL HYDROCHLORIDE 50 MG/1
50 TABLET ORAL EVERY 8 HOURS PRN
Status: DISCONTINUED | OUTPATIENT
Start: 2024-04-15 | End: 2024-04-17 | Stop reason: HOSPADM

## 2024-04-15 RX ORDER — HEPARIN SODIUM 1000 [USP'U]/ML
3400 INJECTION, SOLUTION INTRAVENOUS; SUBCUTANEOUS AS NEEDED
Status: DISCONTINUED | OUTPATIENT
Start: 2024-04-15 | End: 2024-04-17 | Stop reason: HOSPADM

## 2024-04-15 RX ORDER — HYDRALAZINE HYDROCHLORIDE 25 MG/1
25 TABLET, FILM COATED ORAL EVERY 12 HOURS SCHEDULED
Status: DISCONTINUED | OUTPATIENT
Start: 2024-04-15 | End: 2024-04-17 | Stop reason: HOSPADM

## 2024-04-15 RX ORDER — SEVELAMER CARBONATE 800 MG/1
800 TABLET, FILM COATED ORAL
Status: DISCONTINUED | OUTPATIENT
Start: 2024-04-15 | End: 2024-04-17 | Stop reason: HOSPADM

## 2024-04-15 RX ORDER — NITROGLYCERIN 0.4 MG/1
0.4 TABLET SUBLINGUAL
Status: DISCONTINUED | OUTPATIENT
Start: 2024-04-15 | End: 2024-04-17 | Stop reason: HOSPADM

## 2024-04-15 RX ORDER — SODIUM CHLORIDE 0.9 % (FLUSH) 0.9 %
10 SYRINGE (ML) INJECTION EVERY 12 HOURS SCHEDULED
Status: DISCONTINUED | OUTPATIENT
Start: 2024-04-15 | End: 2024-04-17 | Stop reason: HOSPADM

## 2024-04-15 RX ORDER — SODIUM CHLORIDE 0.9 % (FLUSH) 0.9 %
10 SYRINGE (ML) INJECTION AS NEEDED
Status: DISCONTINUED | OUTPATIENT
Start: 2024-04-15 | End: 2024-04-17 | Stop reason: HOSPADM

## 2024-04-15 RX ADMIN — CEFTRIAXONE SODIUM 1000 MG: 1 INJECTION, POWDER, FOR SOLUTION INTRAMUSCULAR; INTRAVENOUS at 01:53

## 2024-04-15 RX ADMIN — LINAGLIPTIN 5 MG: 5 TABLET, FILM COATED ORAL at 08:52

## 2024-04-15 RX ADMIN — ROSUVASTATIN CALCIUM 20 MG: 20 TABLET, FILM COATED ORAL at 21:13

## 2024-04-15 RX ADMIN — BUDESONIDE AND FORMOTEROL FUMARATE DIHYDRATE 2 PUFF: 160; 4.5 AEROSOL RESPIRATORY (INHALATION) at 21:36

## 2024-04-15 RX ADMIN — Medication 10 ML: at 08:53

## 2024-04-15 RX ADMIN — HYDRALAZINE HYDROCHLORIDE 25 MG: 25 TABLET ORAL at 21:13

## 2024-04-15 RX ADMIN — PANTOPRAZOLE SODIUM 40 MG: 40 TABLET, DELAYED RELEASE ORAL at 08:52

## 2024-04-15 RX ADMIN — SEVELAMER CARBONATE 800 MG: 800 TABLET, FILM COATED ORAL at 12:09

## 2024-04-15 RX ADMIN — CARVEDILOL 25 MG: 25 TABLET, FILM COATED ORAL at 21:13

## 2024-04-15 RX ADMIN — ACETAMINOPHEN 650 MG: 325 TABLET ORAL at 21:13

## 2024-04-15 RX ADMIN — DOCUSATE SODIUM 50MG AND SENNOSIDES 8.6MG 2 TABLET: 8.6; 5 TABLET, FILM COATED ORAL at 21:13

## 2024-04-15 RX ADMIN — CARVEDILOL 25 MG: 25 TABLET, FILM COATED ORAL at 08:52

## 2024-04-15 RX ADMIN — HEPARIN SODIUM 3400 UNITS: 1000 INJECTION INTRAVENOUS; SUBCUTANEOUS at 19:06

## 2024-04-15 RX ADMIN — TRAMADOL HYDROCHLORIDE 50 MG: 50 TABLET ORAL at 23:44

## 2024-04-15 RX ADMIN — SEVELAMER CARBONATE 800 MG: 800 TABLET, FILM COATED ORAL at 21:13

## 2024-04-15 RX ADMIN — ERGOCALCIFEROL 50000 UNITS: 1.25 CAPSULE ORAL at 08:52

## 2024-04-15 RX ADMIN — DOCUSATE SODIUM 50MG AND SENNOSIDES 8.6MG 2 TABLET: 8.6; 5 TABLET, FILM COATED ORAL at 08:51

## 2024-04-15 RX ADMIN — Medication 10 ML: at 21:14

## 2024-04-15 RX ADMIN — CETIRIZINE HYDROCHLORIDE 10 MG: 10 TABLET, FILM COATED ORAL at 08:51

## 2024-04-15 RX ADMIN — SEVELAMER CARBONATE 800 MG: 800 TABLET, FILM COATED ORAL at 08:52

## 2024-04-15 RX ADMIN — HYDRALAZINE HYDROCHLORIDE 25 MG: 25 TABLET ORAL at 08:52

## 2024-04-15 RX ADMIN — BUDESONIDE AND FORMOTEROL FUMARATE DIHYDRATE 2 PUFF: 160; 4.5 AEROSOL RESPIRATORY (INHALATION) at 09:07

## 2024-04-15 NOTE — PLAN OF CARE
Goal Outcome Evaluation:              Outcome Evaluation: At time of this shift note, patient is still in dialysis, report not yet received from dialysis RN.

## 2024-04-15 NOTE — PLAN OF CARE
Goal Outcome Evaluation:  Plan of Care Reviewed With: patient        Progress: no change  Outcome Evaluation: Patient presents with deficits in balance, endurance, transfers, and ambulation. Patient will benefit from skilled PT services to address these mobility deficits and decrease risk of falls.      Anticipated Discharge Disposition (PT): home with home health

## 2024-04-15 NOTE — THERAPY EVALUATION
Acute Care - Physical Therapy Initial Evaluation  MAURICE Escamilla     Patient Name: Charlette Rai  : 1935  MRN: [id number]  Today's Date: 4/15/2024      Visit Dx:     ICD-10-CM ICD-9-CM   1. Hypervolemia, unspecified hypervolemia type  E87.70 276.69   2. Acute on chronic congestive heart failure, unspecified heart failure type  I50.9 428.0   3. Bronchitis  J40 490   4. Difficulty walking  R26.2 719.7     Patient Active Problem List   Diagnosis    Malignant neoplasm of upper lobe of right lung    Allergic rhinitis    Rheumatoid arthritis    Asthma    Chronic obstructive pulmonary disease    Essential hypertension    GERD (gastroesophageal reflux disease)    Hyperlipidemia LDL goal <70    Lumbar spinal stenosis    Migraine    Monoclonal paraproteinemia    Osteopenia    Oxygen dependent    Peripheral neuropathy    Stage 4 chronic kidney disease    Type 2 diabetes mellitus    Vitamin D deficiency    Carotid artery stenosis    Chronic right-sided thoracic back pain    Acute pain of left shoulder    Peripheral vascular disease of lower extremity    Edema    Ex-smoker    Peripheral vascular disease    De Quervain's tenosynovitis    Arthritis of carpometacarpal (CMC) joint of right thumb    Arthritis of right hand    Steal syndrome of dialysis vascular access    Anemia of chronic renal failure, stage 4 (severe)    Anemia of chronic disease    Aortic stenosis, moderate    Hematoma    ESRD (end stage renal disease)    CHF (congestive heart failure)    ESRD (end stage renal disease)    Volume overload     Past Medical History:   Diagnosis Date    Allergic rhinitis     Anemia     Arthritis     Asthma     USE INHALERS AND NEBULIZERS    Back pain     Bladder disorder     Cancer     LEFT LUNG CANCER  SURGERY AND CHEMO DONE  AND CURRENTLY   RIGHT LUNG CANCER HAS ONLY RECEIVED RADIATION THUS FAR    Chronic kidney disease     stage 3    CKD (chronic kidney disease) stage 4, GFR 15-29 ml/min     Condition not found      ulcer    Congestive heart failure (CHF)     FOLLOWED BY DR AQUINO. DENIES CP BUT DOES HAVE SOA CHRONIC ISSUE COPD/LUNG CANCER    COPD (chronic obstructive pulmonary disease)     FOLLOWED BY DR MARIAJOSE BAILEY    Coronary artery disease     DENIES CP BUT DOES GET SOA MOST OF THE TIME WITH EXERTION BUT OCC AT REST CHRONIC ISSUE COPD/LUNG CANCER    Deep vein thrombosis     Diabetes mellitus     DOES NOT CHECK BS DAILY    Disease of thyroid gland     HYPOTHYROIDISM    Essential hypertension     Gastric ulcer     GERD (gastroesophageal reflux disease)     Heart murmur     History of transfusion     NO ISSUES POST TRANSFUSION WAS MANY YEARS AGO    Hyperlipemia     Leukocytopenia     FOLLOWED BY DR MIR BALIEY    Limb swelling     Lumbago     Lumbar spinal stenosis     Lung cancer     Migraine headache     Multiple joint pain     On home oxygen therapy     3L/NC PRN    Osteopenia     Reflux esophagitis     Shortness of breath     Thyroid nodule     HAS ULTRASOUND YEARLY BEING MONITORED    Vascular disease     Vitamin D deficiency      Past Surgical History:   Procedure Laterality Date    ABDOMINAL SURGERY      APPENDECTOMY      ARTERIOVENOUS FISTULA/SHUNT SURGERY Left 05/10/2022    Procedure: Left basilic vein transposition;  Surgeon: Wan Barksdale MD;  Location: [facility] OR;  Service: Vascular;  Laterality: Left;    ARTERIOVENOUS FISTULA/SHUNT SURGERY Left 3/23/2023    Procedure: Ligation of left arm arteriovenous fistula;  Surgeon: Wan Barksdale MD;  Location: [facility] MAIN OR;  Service: Vascular;  Laterality: Left;    ARTERIOVENOUS FISTULA/SHUNT SURGERY Left 11/30/2023    Procedure: Creation of left arm arteriovenous graft;  Surgeon: Wan Barksdale MD;  Location: [facility] OR;  Service: Vascular;  Laterality: Left;    CARDIAC CATHETERIZATION  1996    CARDIAC SURGERY      CARDIAC SURGERY      fluid drained from heart    CATARACT EXTRACTION, BILATERAL  2003    COLONOSCOPY  2014    ENDOSCOPY  2016 2019    FEMORAL ARTERY  STENT Bilateral     HYSTERECTOMY      LUNG BIOPSY Left 2005    lobectomy upper lung caner    LUNG VOLUME REDUCTION      OTHER SURGICAL HISTORY      artifical joints/limbs    REPLACEMENT TOTAL KNEE Left 2016    UPPER GASTROINTESTINAL ENDOSCOPY       PT Assessment (Last 12 Hours)       PT Evaluation and Treatment       Row Name 04/15/24 1400          Physical Therapy Time and Intention    Document Type evaluation  -AV     Mode of Treatment individual therapy;physical therapy  -AV     Symptoms Noted During/After Treatment fatigue  -AV       Row Name 04/15/24 1400          General Information    Patient Profile Reviewed yes  -AV     Patient Observations alert;cooperative;agree to therapy  -AV     Prior Level of Function --  Reports assist with ADLs. Ambulated without an assistive device. 3 L O2 at night and as needed throughout the day.  -AV     Equipment Currently Used at Home oxygen  -AV     Existing Precautions/Restrictions fall;oxygen therapy device and L/min  -AV       Row Name 04/15/24 1400          Living Environment    Current Living Arrangements home  -AV     Home Accessibility stairs to enter home;stairs within home  -AV     People in Home spouse  -AV       Row Name 04/15/24 1400          Home Main Entrance    Number of Stairs, Main Entrance three  -AV     Stair Railings, Main Entrance railings on both sides of stairs  -AV       Row Name 04/15/24 1400          Stairs Within Home, Primary    Stairs, Within Home, Primary Non essential flight of interior stairs  -AV       Row Name 04/15/24 1400          Pain    Pretreatment Pain Rating 0/10 - no pain  -AV     Posttreatment Pain Rating 0/10 - no pain  -AV       Row Name 04/15/24 1400          Cognition    Orientation Status (Cognition) oriented x 3  -AV       Row Name 04/15/24 1400          Range of Motion (ROM)    Range of Motion bilateral lower extremities;ROM is WFL  -AV       Row Name 04/15/24 1400          Bed Mobility    Bed Mobility supine-sit;sit-supine   -AV     Supine-Sit [place] (Bed Mobility) standby assist  -AV     Sit-Supine [place] (Bed Mobility) standby assist  -AV       Row Name 04/15/24 1400          Transfers    Transfers sit-stand transfer;stand-sit transfer  -AV       Row Name 04/15/24 1400          Sit-Stand Transfer    Sit-Stand [place] (Transfers) minimum assist (75% patient effort)  -AV     Assistive Device (Sit-Stand Transfers) walker, front-wheeled  -AV       Row Name 04/15/24 1400          Stand-Sit Transfer    Stand-Sit [place] (Transfers) contact guard  -AV     Assistive Device (Stand-Sit Transfers) walker, front-wheeled  -AV       Row Name 04/15/24 1400          Gait/Stairs (Locomotion)    Gait/Stairs Locomotion gait/ambulation independence;gait/ambulation assistive device;distance ambulated  -AV     [place] Level (Gait) contact guard  -AV     Assistive Device (Gait) walker, front-wheeled  -AV     Distance in Feet (Gait) 20  -AV       Row Name 04/15/24 1400          Safety Issues, Functional Mobility    Impairments Affecting Function (Mobility) balance;endurance/activity tolerance;shortness of breath  -AV       Row Name 04/15/24 1400          Balance    Balance Assessment standing dynamic balance  -AV     Dynamic Standing Balance contact guard  -AV     Position/Device Used, Standing Balance supported;walker, front-wheeled  -AV       Row Name             Wound 11/30/23 1803 Left antecubital Incision    Wound - Properties Group Placement Date: 11/30/23  -HG Placement Time: 1803  -HG Side: Left  -HG Location: antecubital  -HG Primary Wound Type: Incision  -HG    Retired Wound - Properties Group Placement Date: 11/30/23  -HG Placement Time: 1803  -HG Side: Left  -HG Location: antecubital  -HG Primary Wound Type: Incision  -HG    Retired Wound - Properties Group Date first assessed: 11/30/23  -HG Time first assessed: 1803  -HG Side: Left  -HG Location: antecubital  -HG Primary Wound Type: Incision  -HG      Row Name 04/15/24  1400          Plan of Care Review    Plan of Care Reviewed With patient  -AV     Progress no change  -AV     Outcome Evaluation Patient presents with deficits in balance, endurance, transfers, and ambulation. Patient will benefit from skilled PT services to address these mobility deficits and decrease risk of falls.  -AV       Row Name 04/15/24 1400          Positioning and Restraints    Pre-Treatment Position in bed  -AV     Post Treatment Position chair  -AV     In Chair sitting;call light within reach;encouraged to call for assist  -AV       Row Name 04/15/24 1400          Therapy Assessment/Plan (PT)    Rehab Potential (PT) good, to achieve stated therapy goals  -AV     Criteria for Skilled Interventions Met (PT) yes;meets criteria  -AV     Therapy Frequency (PT) daily  -AV     Predicted Duration of Therapy Intervention (PT) 10 days  -AV     Problem List (PT) problems related to;balance;mobility;strength  -AV     Activity Limitations Related to Problem List (PT) unable to transfer safely;unable to ambulate safely  -AV       Row Name 04/15/24 1400          PT Evaluation Complexity    History, PT Evaluation Complexity 1-2 personal factors and/or comorbidities  -AV     Examination of Body Systems (PT Eval Complexity) total of 4 or more elements  -AV     Clinical Presentation (PT Evaluation Complexity) stable  -AV     Clinical Decision Making (PT Evaluation Complexity) low complexity  -AV     Overall Complexity (PT Evaluation Complexity) low complexity  -AV       Row Name 04/15/24 1400          Therapy Plan Review/Discharge Plan (PT)    Therapy Plan Review (PT) evaluation/treatment results reviewed;patient  -AV       Row Name 04/15/24 1400          Physical Therapy Goals    Transfer Goal Selection (PT) transfer, PT goal 1  -AV     Gait Training Goal Selection (PT) gait training, PT goal 1  -AV       Row Name 04/15/24 1400          Transfer Goal 1 (PT)    Activity/Assistive Device (Transfer Goal 1, PT)  sit-to-stand/stand-to-sit;bed-to-chair/chair-to-bed;walker, rolling  -AV     [place] Level/Cues Needed (Transfer Goal 1, PT) modified independence  -AV     Time Frame (Transfer Goal 1, PT) 10 days  -AV       Row Name 04/15/24 1400          Gait Training Goal 1 (PT)    Activity/Assistive Device (Gait Training Goal 1, PT) gait (walking locomotion);assistive device use;walker, rolling  -AV     [place] Level (Gait Training Goal 1, PT) modified independence  -AV     Distance (Gait Training Goal 1, PT) 150  -AV     Time Frame (Gait Training Goal 1, PT) 10 days  -AV               User Key  (r) = Recorded By, (t) = Taken By, (c) = Cosigned By      Initials Name Provider Type     Berlin Vale, RN Registered Nurse    Abdi Tee, PT Physical Therapist                    Physical Therapy Education       Title: PT OT SLP Therapies (In Progress)       Topic: Physical Therapy (In Progress)       Point: Mobility training (Done)       Learning Progress Summary             Patient Acceptance, E,TB, VU by AV at 4/15/2024 1436                         Point: Home exercise program (Not Started)       Learner Progress:  Not documented in this visit.              Point: Body mechanics (Done)       Learning Progress Summary             Patient Acceptance, E,TB, VU by AV at 4/15/2024 1436                         Point: Precautions (Done)       Learning Progress Summary             Patient Acceptance, E,TB, VU by AV at 4/15/2024 1436                                         User Key       Initials Effective Dates Name Provider Type Discipline     06/11/21 -  Abdi Odom, PT Physical Therapist PT                  PT Recommendation and Plan  Anticipated Discharge Disposition (PT): home with home health  Planned Therapy Interventions (PT): balance training, bed mobility training, gait training, home exercise program, neuromuscular re-education, strengthening, transfer training  Therapy Frequency (PT): daily  Plan  of Care Reviewed With: patient  Progress: no change  Outcome Evaluation: Patient presents with deficits in balance, endurance, transfers, and ambulation. Patient will benefit from skilled PT services to address these mobility deficits and decrease risk of falls.   Outcome Measures       Row Name 04/15/24 1400             How much help from another person do you currently need...    Turning from your back to your side while in flat bed without using bedrails? 4  -AV      Moving from lying on back to sitting on the side of a flat bed without bedrails? 4  -AV      Moving to and from a bed to a chair (including a wheelchair)? 3  -AV      Standing up from a chair using your arms (e.g., wheelchair, bedside chair)? 3  -AV      Climbing 3-5 steps with a railing? 3  -AV      To walk in hospital room? 3  -AV      AM-PAC 6 Clicks Score (PT) 20  -AV      Highest Level of Mobility Goal 6 --> Walk 10 steps or more  -AV         Functional Assessment    Outcome Measure Options AM-PAC 6 Clicks Basic Mobility (PT)  -AV                User Key  (r) = Recorded By, (t) = Taken By, (c) = Cosigned By      Initials Name Provider Type    AV Abdi Odom, PT Physical Therapist                     Time Calculation:    PT Charges       Row Name 04/15/24 1435             Time Calculation    PT Received On 04/15/24  -AV      PT Goal Re-Cert Due Date 04/24/24  -AV         Untimed Charges    PT Eval/Re-eval Minutes 28  -AV         Total Minutes    Untimed Charges Total Minutes 28  -AV       Total Minutes 28  -AV                User Key  (r) = Recorded By, (t) = Taken By, (c) = Cosigned By      Initials Name Provider Type    AV Abdi Odom, RENAN Physical Therapist                  Therapy Charges for Today       Code Description Service Date Service Provider Modifiers Qty    59668168024 HC PT EVAL LOW COMPLEXITY 2 4/15/2024 Abdi Odom, PT GP 1            PT G-Codes  Outcome Measure Options: AM-PAC 6 Clicks Basic Mobility  (PT)  AM-PAC 6 Clicks Score (PT): 20    Abdi Odom, PT  4/15/2024

## 2024-04-15 NOTE — PLAN OF CARE
Goal Outcome Evaluation:  Plan of Care Reviewed With: patient        Progress: improving  Outcome Evaluation: VSS, no c/o pain or nausea since arriving to floor, transfer to BSC with minimal standby assistance, Dialysis due today, left TDC  to use and resting left AV shunt after clot evac per pt, continue plan of care. Nataliia Woody RN

## 2024-04-15 NOTE — NURSING NOTE
Duration of Treatment 4.5 Hours  Pt ran entire tx   Access Site Tunneled Dialysis Catheter No issues with catheter   Dialyzer Revaclear    mL/min   Dialysate Temperature (C) 36   BFR-As tolerated to a maximum of: 400 mL/min   Catheter tolerated max bfr   Dialysate Solution Bath: K+ = 2 mEq, Ca = 2.5mEq   Bicarb Other   Bicarb Comments 37   Na+ Other   Na+ comments 139   Fluid Removal: 4kg       Pt removed 4 liters of fluid   Pt tolerated tx well. Pt voice no issues also tolerated fluid removal well without issues. B/p post was 176/66 hr 78 no issues during tx . Pt stated how often she has fluid at home with meals and in between. Pt was educated on the amount of fluid that she should have during a 24 hour period between tx that could help prevent some of the fluid retention that she keep getting that is contributing to her fluid overload. Also stress the importance of  making sure she does not miss txs. Blood was returned dressing was changed no issues noted. Pt voice no complaints left by transportation. Report given to Dorothy KHAN. Pt removed 4 liters of fluid.

## 2024-04-15 NOTE — CONSULTS
[facility]   Nephrology Consult Note      Patient Name: Charlette Rai  : 1935  MRN: [id number]  Primary Care Physician:  Rafael Lyons MD  Referring Physician: No ref. provider found  Date of admission: 2024    Subjective   Subjective     Reason for Consult/ Chief Complaint: End-stage renal disease    HPI:  Charlette Rai is a 89 y.o. female with history of end-stage renal disease and congestive heart failure who was admitted overnight with worsening shortness of breath.  Last week I saw her on Monday and was doing okay.  Posttreatment her AVG was audible but faint.  When she returned to dialysis on Wednesday her AVG was clotted.  She was sent to the vascular access center at [facility] where she underwent fistulogram and angioplasty and a right IJ TDC placement.  She returned to dialysis on Friday and was uneventful though she left dialysis with 1-1/2 kg above her dry weight.  Presented last night to the ER with shortness of breath.  No fevers or chills.  She has been having some cough.  When I receive the consult earlier this morning I placed orders for dialysis and discussed with dialysis staff.  Chart reviewed.  I saw her while receiving dialysis this afternoon.  She is still short of breath.    Review of Systems   All systems were reviewed and negative except for: What is mentioned above.    Personal History     Past Medical History:   Diagnosis Date    Allergic rhinitis     Anemia     Arthritis     Asthma     USE INHALERS AND NEBULIZERS    Back pain     Bladder disorder     Cancer     LEFT LUNG CANCER  SURGERY AND CHEMO DONE  AND CURRENTLY   RIGHT LUNG CANCER HAS ONLY RECEIVED RADIATION THUS FAR    Chronic kidney disease     stage 3    CKD (chronic kidney disease) stage 4, GFR 15-29 ml/min     Condition not found     ulcer    Congestive heart failure (CHF)     FOLLOWED BY DR AQUINO. DENIES CP BUT DOES HAVE SOA CHRONIC ISSUE COPD/LUNG CANCER    COPD (chronic obstructive  pulmonary disease)     FOLLOWED BY DR MARIAJOSE BAILEY    Coronary artery disease     DENIES CP BUT DOES GET SOA MOST OF THE TIME WITH EXERTION BUT OCC AT REST CHRONIC ISSUE COPD/LUNG CANCER    Deep vein thrombosis     Diabetes mellitus     DOES NOT CHECK BS DAILY    Disease of thyroid gland     HYPOTHYROIDISM    Essential hypertension     Gastric ulcer     GERD (gastroesophageal reflux disease)     Heart murmur     History of transfusion     NO ISSUES POST TRANSFUSION WAS MANY YEARS AGO    Hyperlipemia     Leukocytopenia     FOLLOWED BY DR MIR BAILEY    Limb swelling     Lumbago     Lumbar spinal stenosis     Lung cancer     Migraine headache     Multiple joint pain     On home oxygen therapy     3L/NC PRN    Osteopenia     Reflux esophagitis     Shortness of breath     Thyroid nodule     HAS ULTRASOUND YEARLY BEING MONITORED    Vascular disease     Vitamin D deficiency        Past Surgical History:   Procedure Laterality Date    ABDOMINAL SURGERY      APPENDECTOMY      ARTERIOVENOUS FISTULA/SHUNT SURGERY Left 05/10/2022    Procedure: Left basilic vein transposition;  Surgeon: Wan Barksdale MD;  Location: [facility] OR;  Service: Vascular;  Laterality: Left;    ARTERIOVENOUS FISTULA/SHUNT SURGERY Left 3/23/2023    Procedure: Ligation of left arm arteriovenous fistula;  Surgeon: Wan Barksdale MD;  Location: [facility] MAIN OR;  Service: Vascular;  Laterality: Left;    ARTERIOVENOUS FISTULA/SHUNT SURGERY Left 11/30/2023    Procedure: Creation of left arm arteriovenous graft;  Surgeon: Wan Barksdale MD;  Location: [facility] MAIN OR;  Service: Vascular;  Laterality: Left;    CARDIAC CATHETERIZATION  1996    CARDIAC SURGERY      CARDIAC SURGERY      fluid drained from heart    CATARACT EXTRACTION, BILATERAL  2003    COLONOSCOPY  2014    ENDOSCOPY  2016    2019    FEMORAL ARTERY STENT Bilateral     HYSTERECTOMY      LUNG BIOPSY Left 2005    lobectomy upper lung caner    LUNG VOLUME REDUCTION      OTHER SURGICAL HISTORY       artifical joints/limbs    REPLACEMENT TOTAL KNEE Left 2016    UPPER GASTROINTESTINAL ENDOSCOPY         Family History: family history includes Arthritis in her father and mother; Cancer in her brother, brother, brother, and brother; Diabetes in her brother; Other in her brother; Prostate cancer in her brother and brother. Otherwise pertinent FHx was reviewed and not pertinent to current issue.    Social History:  reports that she quit smoking about 19 years ago. Her smoking use included cigarettes. She started smoking about 72 years ago. She has a 52.5 pack-year smoking history. She has been exposed to tobacco smoke. She has never used smokeless tobacco. She reports that she does not drink alcohol and does not use drugs.    Home Medications:  Dulaglutide, albuterol sulfate HFA, budesonide-formoterol, carvedilol, dexlansoprazole, ergocalciferol, hydrALAZINE, levocetirizine, linagliptin, nitroglycerin, polycarbophil, rosuvastatin, sevelamer, and traMADol    Allergies:  No Known Allergies    Objective    Objective     Vitals:   Temp:  [97.3 °F (36.3 °C)-97.8 °F (36.6 °C)] 97.8 °F (36.6 °C)  Heart Rate:  [74-87] 78  Resp:  [16-22] 18  BP: (150-180)/(47-86) 150/61  Flow (L/min):  [2-3] 2     Physical Exam    Constitutional: Awake, alert, no distress but has conversational dyspnea, sitting up in bed.   Eyes: sclerae anicteric, no conjunctival injection   HENT: mucous membranes moist   Neck: Supple, no thyromegaly, no lymphadenopathy, trachea midline, + JVD   Respiratory: Clear to auscultation bilaterally, nonlabored respirations    Cardiovascular: RRR, no murmurs, rubs, or gallops.   Gastrointestinal: Positive bowel sounds, soft, nontender, nondistended   Musculoskeletal: +1 edema, no clubbing or cyanosis, left upper extremity AVG is patent.  Right IJ TDC in place.   Psychiatric: Appropriate affect, cooperative   Neurologic: Oriented x 3, moving all extremities, Cranial Nerves grossly intact, speech clear   Skin: warm and  dry, no rashes     Result Review    Result Reviewed:  I have personally reviewed the results from the time of this admission to 4/15/2024 16:20 EDT and agree with these findings:  [x]  Laboratory  []  Microbiology  [x]  Radiology  []  EKG/Telemetry   []  Cardiology/Vascular   []  Pathology  [x]  Old records  []  Other:  LAB RESULTS:    LAB RESULTS:        Lab 04/15/24  0837 04/14/24  2153 04/11/24  0912   SODIUM 139 140  --    POTASSIUM 4.9 4.8 4.5   CHLORIDE 104 106  --    CO2 22.2 23.9  --    BUN 49* 43*  --    CREATININE 2.99* 2.97*  --    GLUCOSE 208* 114*  --    EGFR 14.7* 14.8*  --    ANION GAP 12.8 10.1  --            Most notable findings include: Potassium 4.9, hemoglobin 8.5.    Assessment & Plan   Assessment / Plan     Brief Patient Summary:  Charlette Rai is a 89 y.o. female who has end-stage renal disease, missed dialysis last Wednesday due to thrombosed access, status post angioplasty.  Right IJ TDC placement here with shortness of breath and what appears to be volume overload.    Active Hospital Problems:  Active Hospital Problems    Diagnosis     Volume overload        Assessment and Plan:   - ESRD, dialysis normally MWF schedule with evidence of volume overload.  Plan dialysis today, 4 L ultrafiltration as tolerated.  Plan dialysis again tomorrow if she still short of breath otherwise can wait till Wednesday.  - Thrombosed left upper extremity AVG, status post fistulogram and angioplasty last week with right IJ TDC in place.  This was done at [facility].  - Shortness of breath with volume overload and known congestive heart failure.last 2D echo from October showed grade 1 impaired relaxation with hypertrophy moderate aortic stenosis is present.  Could consider SGLT2 inhibitors though experience with these medications in dialysis patient is relatively new.  - Anemia of CKD, hemoglobin below goal.  On long-acting CASSANDRA as outpatient.  Continue the same.  - Left renal lesion, with recent  spontaneous right perinephric subcapsular hematoma, needs urology evaluation outpatient.  - History of severe hypertension, blood pressure is acceptable.  - Type 2 diabetes with complications.  - Bilateral renal artery stenosis.  - History of lung cancer and COPD.  Discussed with primary and dialysis staff.  Will follow.    Thank you very much for this consult!    Electronically signed by Edi García MD, 4/15/2024, 16:20 EDT.

## 2024-04-15 NOTE — H&P
[facility]   HISTORY AND PHYSICAL    Patient Name: Charlette Rai  : 1935  MRN: [id number]  Primary Care Physician:  Rafael Lyons MD  Date of admission: 2024    Subjective   Subjective     Chief Complaint: Patient was admitted to the emergency room with acute shortness of air she is a regular nephrology patient with Dr. Darden and had missed her dialysis she is therefore being admitted to get her dialysis today and then she will be discharged    HPI: Patient with end-stage kidney disease on dialysis    Charlette Rai is a 89 y.o. female shortness of air because of fluid overload    Review of Systems   All systems were reviewed and negative except for: Has been reviewed    Personal History     Past Medical History:   Diagnosis Date    Allergic rhinitis     Anemia     Arthritis     Asthma     USE INHALERS AND NEBULIZERS    Back pain     Bladder disorder     Cancer     LEFT LUNG CANCER  SURGERY AND CHEMO DONE  AND CURRENTLY   RIGHT LUNG CANCER HAS ONLY RECEIVED RADIATION THUS FAR    Chronic kidney disease     stage 3    CKD (chronic kidney disease) stage 4, GFR 15-29 ml/min     Condition not found     ulcer    Congestive heart failure (CHF)     FOLLOWED BY DR AQUINO. DENIES CP BUT DOES HAVE SOA CHRONIC ISSUE COPD/LUNG CANCER    COPD (chronic obstructive pulmonary disease)     FOLLOWED BY DR MARIAJOSE BAILEY    Coronary artery disease     DENIES CP BUT DOES GET SOA MOST OF THE TIME WITH EXERTION BUT OCC AT REST CHRONIC ISSUE COPD/LUNG CANCER    Deep vein thrombosis     Diabetes mellitus     DOES NOT CHECK BS DAILY    Disease of thyroid gland     HYPOTHYROIDISM    Essential hypertension     Gastric ulcer     GERD (gastroesophageal reflux disease)     Heart murmur     History of transfusion     NO ISSUES POST TRANSFUSION WAS MANY YEARS AGO    Hyperlipemia     Leukocytopenia     FOLLOWED BY DR MIR BAILEY    Limb swelling     Lumbago     Lumbar spinal stenosis     Lung cancer     Migraine headache      Multiple joint pain     On home oxygen therapy     3L/NC PRN    Osteopenia     Reflux esophagitis     Shortness of breath     Thyroid nodule     HAS ULTRASOUND YEARLY BEING MONITORED    Vascular disease     Vitamin D deficiency        Past Surgical History:   Procedure Laterality Date    ABDOMINAL SURGERY      APPENDECTOMY      ARTERIOVENOUS FISTULA/SHUNT SURGERY Left 05/10/2022    Procedure: Left basilic vein transposition;  Surgeon: Wan Barksdale MD;  Location: [facility] MAIN OR;  Service: Vascular;  Laterality: Left;    ARTERIOVENOUS FISTULA/SHUNT SURGERY Left 3/23/2023    Procedure: Ligation of left arm arteriovenous fistula;  Surgeon: Wan Barksdale MD;  Location: [facility] MAIN OR;  Service: Vascular;  Laterality: Left;    ARTERIOVENOUS FISTULA/SHUNT SURGERY Left 11/30/2023    Procedure: Creation of left arm arteriovenous graft;  Surgeon: Wan Barksdale MD;  Location: [facility] OR;  Service: Vascular;  Laterality: Left;    CARDIAC CATHETERIZATION  1996    CARDIAC SURGERY      CARDIAC SURGERY      fluid drained from heart    CATARACT EXTRACTION, BILATERAL  2003    COLONOSCOPY  2014    ENDOSCOPY  2016 2019    FEMORAL ARTERY STENT Bilateral     HYSTERECTOMY      LUNG BIOPSY Left 2005    lobectomy upper lung caner    LUNG VOLUME REDUCTION      OTHER SURGICAL HISTORY      artifical joints/limbs    REPLACEMENT TOTAL KNEE Left 2016    UPPER GASTROINTESTINAL ENDOSCOPY         Family History: family history includes Arthritis in her father and mother; Cancer in her brother, brother, brother, and brother; Diabetes in her brother; Other in her brother; Prostate cancer in her brother and brother. Otherwise pertinent FHx was reviewed and not pertinent to current issue.    Social History:  reports that she quit smoking about 19 years ago. Her smoking use included cigarettes. She started smoking about 72 years ago. She has a 52.5 pack-year smoking history. She has been exposed to tobacco smoke. She has never used  smokeless tobacco. She reports that she does not drink alcohol and does not use drugs.    Home Medications:  albuterol sulfate HFA, budesonide-formoterol, carvedilol, dexlansoprazole, ergocalciferol, hydrALAZINE, levocetirizine, linagliptin, nitroglycerin, polycarbophil, rosuvastatin, sevelamer, and traMADol      Allergies:  No Known Allergies    Objective   Objective     Vitals:   Temp:  [97.5 °F (36.4 °C)-97.6 °F (36.4 °C)] 97.5 °F (36.4 °C)  Heart Rate:  [79-87] 84  Resp:  [18-22] 20  BP: (155-180)/(47-84) 163/51  Flow (L/min):  [3] 3  Physical Exam    Constitutional: Alert oriented not in acute distress   Eyes: Pupils equal reactive to light and accommodation   HENT: Normal   Neck: Normal   Respiratory: Rales or rhonchi   Cardiovascular: S1 S2 normal no S3 or S4   Gastrointestinal: Palpable organomegaly   Musculoskeletal: Normal   Neurologic: No localizing signs  Skin: No rash no abnormalities    Result Review    Result Review:  I have personally reviewed the results from the time of this admission to 4/15/2024 08:10 EDT and agree with these findings:  [x]  Laboratory  []  Microbiology  [x]  Radiology  []  EKG/Telemetry   []  Cardiology/Vascular   []  Pathology  [x]  Old records  []  Other:  Most notable findings include: Reviewed and discussed    Assessment & Plan   Assessment / Plan     Brief Patient Summary:  Charlette Rai is a 89 y.o. female who patient with fluid overload because of lack of dialysis will be getting dialysis today    Active Hospital Problems:  Active Hospital Problems    Diagnosis     Volume overload        Plan:   Dialysis to be done today    DVT prophylaxis:  Mechanical DVT prophylaxis orders are present.        CODE STATUS:    Level Of Support Discussed With: Patient  Code Status (Patient has no pulse and is not breathing): CPR (Attempt to Resuscitate)  Medical Interventions (Patient has pulse or is breathing): Full Support      Admission Status:  I believe this patient meets  observation status.    Electronically signed by Benji Barragan MD, 04/15/24, 8:10 AM EDT.      Part of this note may be an electronic transcription/translation of spoken language to printed text using the Dragon Dictation System.

## 2024-04-15 NOTE — CONSULTS
[facility]   Nephrology Consult Note      Patient Name: Charlette Rai  : 1935  MRN: [id number]  Primary Care Physician:  Rafael Lyons MD  Referring Physician: No ref. provider found  Date of admission: 2024    Subjective   Subjective     Reason for Consult/ Chief Complaint: Diabetes, hypertension/CHF  HPI:  Charlette Rai is a 89 y.o. female well-known to me with multiple medical problems including end-stage renal disease admitted for volume overload patient was getting dialyzed as scheduled.  She has some dialysis catheter issues going on for last few weeks.  Patient admitted for shortness of breath and increased volume.  Scheduled to have dialysis today.  Patient feels otherwise fine    Review of Systems  Chest pain, shortness of breath, no fever, no cough, no chills, no nausea vomiting    Personal History     Past Medical History:   Diagnosis Date    Allergic rhinitis     Anemia     Arthritis     Asthma     USE INHALERS AND NEBULIZERS    Back pain     Bladder disorder     Cancer     LEFT LUNG CANCER  SURGERY AND CHEMO DONE  AND CURRENTLY   RIGHT LUNG CANCER HAS ONLY RECEIVED RADIATION THUS FAR    Chronic kidney disease     stage 3    CKD (chronic kidney disease) stage 4, GFR 15-29 ml/min     Condition not found     ulcer    Congestive heart failure (CHF)     FOLLOWED BY DR AQUINO. DENIES CP BUT DOES HAVE SOA CHRONIC ISSUE COPD/LUNG CANCER    COPD (chronic obstructive pulmonary disease)     FOLLOWED BY DR MARIAJOSE BAILEY    Coronary artery disease     DENIES CP BUT DOES GET SOA MOST OF THE TIME WITH EXERTION BUT OCC AT REST CHRONIC ISSUE COPD/LUNG CANCER    Deep vein thrombosis     Diabetes mellitus     DOES NOT CHECK BS DAILY    Disease of thyroid gland     HYPOTHYROIDISM    Essential hypertension     Gastric ulcer     GERD (gastroesophageal reflux disease)     Heart murmur     History of transfusion     NO ISSUES POST TRANSFUSION WAS MANY YEARS AGO    Hyperlipemia     Leukocytopenia      FOLLOWED BY DR MIR BAILEY    Limb swelling     Lumbago     Lumbar spinal stenosis     Lung cancer     Migraine headache     Multiple joint pain     On home oxygen therapy     3L/NC PRN    Osteopenia     Reflux esophagitis     Shortness of breath     Thyroid nodule     HAS ULTRASOUND YEARLY BEING MONITORED    Vascular disease     Vitamin D deficiency        Past Surgical History:   Procedure Laterality Date    ABDOMINAL SURGERY      APPENDECTOMY      ARTERIOVENOUS FISTULA/SHUNT SURGERY Left 05/10/2022    Procedure: Left basilic vein transposition;  Surgeon: Wan Barksdale MD;  Location: [facility] MAIN OR;  Service: Vascular;  Laterality: Left;    ARTERIOVENOUS FISTULA/SHUNT SURGERY Left 3/23/2023    Procedure: Ligation of left arm arteriovenous fistula;  Surgeon: Wan Barksdale MD;  Location: [facility] MAIN OR;  Service: Vascular;  Laterality: Left;    ARTERIOVENOUS FISTULA/SHUNT SURGERY Left 11/30/2023    Procedure: Creation of left arm arteriovenous graft;  Surgeon: Wan Barksdale MD;  Location: [facility] MAIN OR;  Service: Vascular;  Laterality: Left;    CARDIAC CATHETERIZATION  1996    CARDIAC SURGERY      CARDIAC SURGERY      fluid drained from heart    CATARACT EXTRACTION, BILATERAL  2003    COLONOSCOPY  2014    ENDOSCOPY  2016 2019    FEMORAL ARTERY STENT Bilateral     HYSTERECTOMY      LUNG BIOPSY Left 2005    lobectomy upper lung caner    LUNG VOLUME REDUCTION      OTHER SURGICAL HISTORY      artifical joints/limbs    REPLACEMENT TOTAL KNEE Left 2016    UPPER GASTROINTESTINAL ENDOSCOPY         Family History: family history includes Arthritis in her father and mother; Cancer in her brother, brother, brother, and brother; Diabetes in her brother; Other in her brother; Prostate cancer in her brother and brother. Otherwise pertinent FHx was reviewed and not pertinent to current issue.    Social History:  reports that she quit smoking about 19 years ago. Her smoking use included cigarettes. She started smoking  about 72 years ago. She has a 52.5 pack-year smoking history. She has been exposed to tobacco smoke. She has never used smokeless tobacco. She reports that she does not drink alcohol and does not use drugs.    Home Medications:  Dulaglutide, albuterol sulfate HFA, budesonide-formoterol, carvedilol, dexlansoprazole, ergocalciferol, hydrALAZINE, levocetirizine, linagliptin, nitroglycerin, polycarbophil, rosuvastatin, sevelamer, and traMADol    Allergies:  No Known Allergies    Objective    Objective     Vitals:   Temp:  [97.3 °F (36.3 °C)-97.8 °F (36.6 °C)] 97.8 °F (36.6 °C)  Heart Rate:  [74-87] 77  Resp:  [16-22] 18  BP: (150-180)/(47-86) 169/66  Flow (L/min):  [2-3] 2     Physical Exam    Ivanna female looks younger than her stated age, not in acute distress.  Neck supple using some accessory muscles when she talks.  Heart regular.  Grade 2 murmur.  Lungs clear, diminished breath sounds.  Abdomen soft and obese nontender.  Extremities trace of edema    Result Review    Result Reviewed:  I have personally reviewed the results from the time of this admission to 4/15/2024 19:07 EDT and agree with these findings:  [x]  Laboratory  []  Microbiology  [x]  Radiology  []  EKG/Telemetry   []  Cardiology/Vascular   []  Pathology  [x]  Old records  []  Other:  LAB RESULTS:    LAB RESULTS:        Lab 04/15/24  0837 04/14/24  2153 04/11/24  0912   SODIUM 139 140  --    POTASSIUM 4.9 4.8 4.5   CHLORIDE 104 106  --    CO2 22.2 23.9  --    BUN 49* 43*  --    CREATININE 2.99* 2.97*  --    GLUCOSE 208* 114*  --    EGFR 14.7* 14.8*  --    ANION GAP 12.8 10.1  --            Most notable findings include: Potassium 4.9, hemoglobin 8.5.    Assessment & Plan   Assessment / Plan       Active Hospital Problems:  Active Hospital Problems    Diagnosis     Volume overload     Anemia of chronic disease     Essential hypertension     Chronic obstructive pulmonary disease     Type 2 diabetes mellitus        Assessment and Plan:   - Patient  stable, being followed and seen by nephrologist, discussed with Dr. García to proceed with dialysis today.  Resume essential home meds.  Monitor sugars.  Neb treatments as needed.  Further management will based on clinical course lab results and consultant input    Thank you very much for this consult!    Electronically signed by Rafael Lyons MD, 4/15/2024, 19:07 EDT.

## 2024-04-15 NOTE — NURSING NOTE
Report received from dialysis RN, Snow, removed 4L. VSS[place] [facility] [place]

## 2024-04-15 NOTE — ED PROVIDER NOTES
Time: 10:45 PM EDT  Date of encounter:  4/14/2024  Independent Historian/Clinical History and Information was obtained by:   Patient    History is limited by: N/A    Chief Complaint: shortness of breath      History of Present Illness:  Patient is a 89 y.o. year old female who presents to the emergency department for evaluation of Shortness of breath.  Patient was reports productive cough.  Symptoms started this morning.  She reports productive cough with green sputum.  No fevers.  No chest pain.  Patient is currently on dialysis last dialysis was Friday.  She has noted lower extremity edema.    HPI    Patient Care Team  Primary Care Provider: Rafael Lyons MD    Past Medical History:     No Known Allergies  Past Medical History:   Diagnosis Date    Allergic rhinitis     Anemia     Arthritis     Asthma     USE INHALERS AND NEBULIZERS    Back pain     Bladder disorder     Cancer     LEFT LUNG CANCER 2005 SURGERY AND CHEMO DONE  AND CURRENTLY 4/ 14/22  RIGHT LUNG CANCER HAS ONLY RECEIVED RADIATION THUS FAR    Chronic kidney disease     stage 3    CKD (chronic kidney disease) stage 4, GFR 15-29 ml/min     Condition not found     ulcer    Congestive heart failure (CHF)     FOLLOWED BY DR AQUINO. DENIES CP BUT DOES HAVE SOA CHRONIC ISSUE COPD/LUNG CANCER    COPD (chronic obstructive pulmonary disease)     FOLLOWED BY DR MARIAJOSE BAILEY    Coronary artery disease     DENIES CP BUT DOES GET SOA MOST OF THE TIME WITH EXERTION BUT OCC AT REST CHRONIC ISSUE COPD/LUNG CANCER    Deep vein thrombosis     Diabetes mellitus     DOES NOT CHECK BS DAILY    Disease of thyroid gland     HYPOTHYROIDISM    Essential hypertension     Gastric ulcer     GERD (gastroesophageal reflux disease)     Heart murmur     History of transfusion     NO ISSUES POST TRANSFUSION WAS MANY YEARS AGO    Hyperlipemia     Leukocytopenia     FOLLOWED BY DR MIR BAILEY    Limb swelling     Lumbago     Lumbar spinal stenosis     Lung cancer     Migraine headache      Multiple joint pain     On home oxygen therapy     3L/NC PRN    Osteopenia     Reflux esophagitis     Shortness of breath     Thyroid nodule     HAS ULTRASOUND YEARLY BEING MONITORED    Vascular disease     Vitamin D deficiency      Past Surgical History:   Procedure Laterality Date    ABDOMINAL SURGERY      APPENDECTOMY      ARTERIOVENOUS FISTULA/SHUNT SURGERY Left 05/10/2022    Procedure: Left basilic vein transposition;  Surgeon: Wan Barksdale MD;  Location: [facility] MAIN OR;  Service: Vascular;  Laterality: Left;    ARTERIOVENOUS FISTULA/SHUNT SURGERY Left 3/23/2023    Procedure: Ligation of left arm arteriovenous fistula;  Surgeon: Wan Barksdale MD;  Location: [facility] MAIN OR;  Service: Vascular;  Laterality: Left;    ARTERIOVENOUS FISTULA/SHUNT SURGERY Left 11/30/2023    Procedure: Creation of left arm arteriovenous graft;  Surgeon: Wan Barksdale MD;  Location: [facility] MAIN OR;  Service: Vascular;  Laterality: Left;    CARDIAC CATHETERIZATION  1996    CARDIAC SURGERY      CARDIAC SURGERY      fluid drained from heart    CATARACT EXTRACTION, BILATERAL  2003    COLONOSCOPY  2014    ENDOSCOPY  2016 2019    FEMORAL ARTERY STENT Bilateral     HYSTERECTOMY      LUNG BIOPSY Left 2005    lobectomy upper lung caner    LUNG VOLUME REDUCTION      OTHER SURGICAL HISTORY      artifical joints/limbs    REPLACEMENT TOTAL KNEE Left 2016    UPPER GASTROINTESTINAL ENDOSCOPY       Family History   Problem Relation Age of Onset    Arthritis Mother     Arthritis Father     Cancer Brother     Diabetes Brother     Other Brother         blood disease     Prostate cancer Brother     Cancer Brother     Prostate cancer Brother     Cancer Brother     Cancer Brother     Malig Hyperthermia Neg Hx     Colon cancer Neg Hx        Home Medications:  Prior to Admission medications    Medication Sig Start Date End Date Taking? Authorizing Provider   albuterol sulfate  (90 Base) MCG/ACT inhaler Inhale 2 puffs Every 4 (Four) Hours As  Needed for Wheezing.    Sarah Parker MD   budesonide-formoterol (SYMBICORT) 160-4.5 MCG/ACT inhaler Inhale 2 puffs 2 (Two) Times a Day.    Sarah Parker MD   carvedilol (Coreg) 25 MG tablet Take 1 tablet by mouth 2 (Two) Times a Day With Meals for 30 days. 23  Patrick Turcios MD   dexlansoprazole (DEXILANT) 60 MG capsule Take 1 capsule by mouth Daily. 22   Sarah Parker MD   ergocalciferol (ERGOCALCIFEROL) 1.25 MG (13514 UT) capsule Take 1 capsule by mouth Every 14 (Fourteen) Days. 3/4/22   Emergency, Nurse Emy, RN   hydrALAZINE (APRESOLINE) 25 MG tablet Take 1 tablet by mouth Every 12 (Twelve) Hours. 3/9/24   Emily Chandra MD   levocetirizine (XYZAL) 5 MG tablet TAKE 1 TABLET DAILY  Patient taking differently: Take 1 tablet by mouth Every Evening. 23   Paloma James MD   linagliptin (Tradjenta) 5 MG tablet tablet Take 1 tablet by mouth Daily. 1/10/22   Paloma James MD   nitroglycerin (NITROSTAT) 0.4 MG SL tablet Place 1 tablet under the tongue Every 5 (Five) Minutes As Needed for Chest Pain.    Sarah Parker MD   polycarbophil (calcium polycarbophil) 625 MG tablet tablet Take 1 tablet by mouth Daily As Needed.    Sarah Parker MD   rosuvastatin (CRESTOR) 20 MG tablet Take 1 tablet by mouth Daily. 23   León Sanchez MD   sevelamer (RENVELA) 800 MG tablet Take 1 tablet by mouth 3 (Three) Times a Day With Meals. 10/9/23   Sarah Parker MD   traMADol (ULTRAM) 50 MG tablet Take 1 tablet by mouth Every 8 (Eight) Hours As Needed for Moderate Pain for up to 30 days. 4/3/24 5/3/24  Rafael Lyons MD        Social History:   Social History     Tobacco Use    Smoking status: Former     Current packs/day: 0.00     Average packs/day: 1 pack/day for 52.5 years (52.5 ttl pk-yrs)     Types: Cigarettes     Start date:      Quit date: 2004     Years since quittin.8     Passive exposure: Past    Smokeless tobacco:  "Never   Vaping Use    Vaping status: Never Used   Substance Use Topics    Alcohol use: Never    Drug use: Never         Review of Systems:  Review of Systems   Constitutional:  Negative for chills and fever.   HENT:  Negative for congestion, ear pain and sore throat.    Eyes:  Negative for pain.   Respiratory:  Positive for cough and shortness of breath. Negative for chest tightness.    Cardiovascular:  Positive for leg swelling. Negative for chest pain.   Gastrointestinal:  Negative for abdominal pain, diarrhea, nausea and vomiting.   Genitourinary:  Negative for flank pain and hematuria.   Musculoskeletal:  Negative for joint swelling.   Skin:  Negative for pallor.   Neurological:  Negative for seizures and headaches.   All other systems reviewed and are negative.       Physical Exam:  /48   Pulse 81   Temp 97.6 °F (36.4 °C) (Oral)   Resp 18   Ht 165.1 cm (65\")   Wt 69.5 kg (153 lb 3.5 oz)   LMP  (LMP Unknown)   SpO2 94%   BMI 25.50 kg/m²     Physical Exam  Constitutional:       Appearance: Normal appearance.   HENT:      Head: Normocephalic and atraumatic.      Nose: Nose normal.      Mouth/Throat:      Mouth: Mucous membranes are moist.   Eyes:      Extraocular Movements: Extraocular movements intact.      Conjunctiva/sclera: Conjunctivae normal.      Pupils: Pupils are equal, round, and reactive to light.   Cardiovascular:      Rate and Rhythm: Normal rate and regular rhythm.      Pulses: Normal pulses.      Heart sounds: Normal heart sounds.   Pulmonary:      Effort: Pulmonary effort is normal.      Breath sounds: Rhonchi present.   Abdominal:      General: There is no distension.      Palpations: Abdomen is soft.      Tenderness: There is no abdominal tenderness.   Musculoskeletal:         General: Normal range of motion.      Cervical back: Normal range of motion.   Skin:     General: Skin is warm and dry.      Capillary Refill: Capillary refill takes less than 2 seconds.   Neurological:      " General: No focal deficit present.      Mental Status: She is alert and oriented to person, place, and time. Mental status is at baseline.   Psychiatric:         Mood and Affect: Mood normal.         Behavior: Behavior normal.                  Procedures:  Procedures      Medical Decision Making:      Comorbidities that affect care:    Cancer, Chronic Kidney Disease, COPD, Diabetes, Hypertension, Thyroid Disease    External Notes reviewed:    Previous Admission Note: Patient was admitted on 4/1/2024 for acute exacerbation of COPD and chronic kidney disease.      The following orders were placed and all results were independently analyzed by me:  Orders Placed This Encounter   Procedures    Blood Culture - Blood,    Blood Culture - Blood,    XR Chest 1 View    Greer Draw    Comprehensive Metabolic Panel    BNP    Single High Sensitivity Troponin T    CBC Auto Differential    High Sensitivity Troponin T 2Hr    Lactic Acid, Plasma    NPO Diet NPO Type: Strict NPO    Undress & Gown    Continuous Pulse Oximetry    Vital Signs    IP General Consult (Use specialty-specific consult if known)    Oxygen Therapy- Nasal Cannula; Titrate 1-6 LPM Per SpO2; 90 - 95%    ECG 12 Lead ED Triage Standing Order; SOA    Insert Peripheral IV    Initiate Observation Status    CBC & Differential    Green Top (Gel)    Lavender Top    Gold Top - SST    Light Blue Top       Medications Given in the Emergency Department:  Medications   sodium chloride 0.9 % flush 10 mL (has no administration in time range)   cefTRIAXone (ROCEPHIN) 1000 mg/100 mL 0.9% NS (MBP) (1,000 mg Intravenous New Bag 4/15/24 0153)   ipratropium-albuterol (DUO-NEB) nebulizer solution 3 mL (3 mL Nebulization Given 4/14/24 6542)        ED Course:    ED Course as of 04/15/24 0203   Sun Apr 14, 2024   2249 ECG 12 Lead ED Triage Standing Order; SOA  Sinus rhythm rate of 85.  Prolonged QT interval.  Nonspecific T wave abnormality.  No acute ST elevation.  EKG interpreted by me  [LD]      ED Course User Index  [LD] Ghassan Torres MD       Labs:    Lab Results (last 24 hours)       Procedure Component Value Units Date/Time    CBC & Differential [[id number]]  (Abnormal) Collected: 04/14/24 2153    Specimen: Blood Updated: 04/14/24 2206    Narrative:      The following orders were created for panel order CBC & Differential.  Procedure                               Abnormality         Status                     ---------                               -----------         ------                     CBC Auto Differential[[id number]]        Abnormal            Final result               Scan Slide[[id number]]                                                                    Please view results for these tests on the individual orders.    Comprehensive Metabolic Panel [[id number]]  (Abnormal) Collected: 04/14/24 2153    Specimen: Blood Updated: 04/14/24 2219     Glucose 114 mg/dL      BUN 43 mg/dL      Creatinine 2.97 mg/dL      Sodium 140 mmol/L      Potassium 4.8 mmol/L      Comment: Slight hemolysis detected by analyzer. Result may be falsely elevated.        Chloride 106 mmol/L      CO2 23.9 mmol/L      Calcium 8.1 mg/dL      Total Protein 5.7 g/dL      Albumin 3.4 g/dL      ALT (SGPT) 11 U/L      AST (SGOT) 18 U/L      Comment: Slight hemolysis detected by analyzer. Result may be falsely elevated.        Alkaline Phosphatase 141 U/L      Total Bilirubin 0.3 mg/dL      Globulin 2.3 gm/dL      A/G Ratio 1.5 g/dL      BUN/Creatinine Ratio 14.5     Anion Gap 10.1 mmol/L      eGFR 14.8 mL/min/1.73      Comment: <15 Indicative of kidney failure       Narrative:      GFR Normal >60  Chronic Kidney Disease <60  Kidney Failure <15    The GFR formula is only valid for adults with stable renal function between ages 18 and 70.    BNP [[id number]]  (Abnormal) Collected: 04/14/24 2153    Specimen: Blood Updated: 04/14/24 2216     proBNP 25,035.0 pg/mL     Narrative:      This assay is used as an aid in the  diagnosis of individuals suspected of having heart failure. It can be used as an aid in the diagnosis of acute decompensated heart failure (ADHF) in patients presenting with signs and symptoms of ADHF to the emergency department (ED). In addition, NT-proBNP of <300 pg/mL indicates ADHF is not likely.    Age Range Result Interpretation  NT-proBNP Concentration (pg/mL:      <50             Positive            >450                   Gray                 300-450                    Negative             <300    50-75           Positive            >900                  Gray                300-900                  Negative            <300      >75             Positive            >1800                  Gray                300-1800                  Negative            <300    Single High Sensitivity Troponin T [[id number]]  (Abnormal) Collected: 04/14/24 2153    Specimen: Blood Updated: 04/14/24 2229     HS Troponin T 64 ng/L     Narrative:      High Sensitive Troponin T Reference Range:  <14.0 ng/L- Negative Female for AMI  <22.0 ng/L- Negative Male for AMI  >=14 - Abnormal Female indicating possible myocardial injury.  >=22 - Abnormal Male indicating possible myocardial injury.   Clinicians would have to utilize clinical acumen, EKG, Troponin, and serial changes to determine if it is an Acute Myocardial Infarction or myocardial injury due to an underlying chronic condition.         CBC Auto Differential [[id number]]  (Abnormal) Collected: 04/14/24 2153    Specimen: Blood Updated: 04/14/24 2206     WBC 7.58 10*3/mm3      RBC 2.77 10*6/mm3      Hemoglobin 8.2 g/dL      Hematocrit 27.7 %      .0 fL      MCH 29.6 pg      MCHC 29.6 g/dL      RDW 14.7 %      RDW-SD 53.6 fl      MPV 10.2 fL      Platelets 95 10*3/mm3      Neutrophil % 77.4 %      Lymphocyte % 9.1 %      Monocyte % 10.0 %      Eosinophil % 2.9 %      Basophil % 0.3 %      Immature Grans % 0.3 %      Neutrophils, Absolute 5.87 10*3/mm3      Lymphocytes,  Absolute 0.69 10*3/mm3      Monocytes, Absolute 0.76 10*3/mm3      Eosinophils, Absolute 0.22 10*3/mm3      Basophils, Absolute 0.02 10*3/mm3      Immature Grans, Absolute 0.02 10*3/mm3      nRBC 0.0 /100 WBC     High Sensitivity Troponin T 2Hr [[id number]]  (Abnormal) Collected: 04/15/24 0001    Specimen: Blood Updated: 04/15/24 0044     HS Troponin T 64 ng/L      Troponin T Delta 0 ng/L     Narrative:      High Sensitive Troponin T Reference Range:  <14.0 ng/L- Negative Female for AMI  <22.0 ng/L- Negative Male for AMI  >=14 - Abnormal Female indicating possible myocardial injury.  >=22 - Abnormal Male indicating possible myocardial injury.   Clinicians would have to utilize clinical acumen, EKG, Troponin, and serial changes to determine if it is an Acute Myocardial Infarction or myocardial injury due to an underlying chronic condition.         Lactic Acid, Plasma [[id number]]  (Normal) Collected: 04/15/24 0132    Specimen: Blood from Arm, Right Updated: 04/15/24 0154     Lactate 0.6 mmol/L     Blood Culture - Blood, Arm, Left [[id number]] Collected: 04/15/24 0132    Specimen: Blood from Arm, Left Updated: 04/15/24 0137    Blood Culture - Blood, Arm, Right [[id number]] Collected: 04/15/24 0132    Specimen: Blood from Arm, Right Updated: 04/15/24 0137             Imaging:    XR Chest 1 View    Result Date: 4/14/2024  AP PORTABLE CHEST  HISTORY: Shortness of air. History of lung cancer.  COMPARISON: 4/1/2024  TECHNIQUE: AP portable chest x-ray.  FINDINGS: Central venous catheter tip is at the level of the lower SVC. Extensive atherosclerotic disease is noted. Mediastinal structures are shifted to the left. Mitral valve annulus calcifications are present. Extensive postoperative change in the left hemithorax appears stable. Fibrotic appearing opacity in the right perihilar lung is also stable. No definite acute infiltrates. No pneumothorax.      Stable appearance of the chest with no definite acute findings.   Electronically Signed By-Dr. Weston Castillo MD On:4/14/2024 10:38 PM         Differential Diagnosis and Discussion:    Dyspnea: Differential diagnosis includes but is not limited to metabolic acidosis, neurological disorders, psychogenic, asthma, pneumothorax, upper airway obstruction, COPD, pneumonia, noncardiogenic pulmonary edema, interstitial lung disease, anemia, congestive heart failure, and pulmonary embolism    All labs were reviewed and interpreted by me.  All X-rays impressions were independently interpreted by me.  EKG was interpreted by me.    MDM  Number of Diagnoses or Management Options  Acute on chronic congestive heart failure, unspecified heart failure type  Bronchitis  Hypervolemia, unspecified hypervolemia type  Diagnosis management comments: Patient is afebrile nontoxic-appearing.  Vital signs stable.  Patient's O2 sat mid 90s on 3 L.  She is on 3 L at home as needed.  Patient reports worsening shortness of breath worsening lower extremity edema.  Patient does have some rhonchi on exam.  She also reports productive cough.  Will treat for possible bronchitis.  Patient states she is unable to lay down.  Patient feels uncomfortable going home due to shortness of breath.  Patient appears to be volume overloaded.  Discussed patient with hospitalist and will admit for further care.       Amount and/or Complexity of Data Reviewed  Clinical lab tests: reviewed  Tests in the radiology section of CPT®: reviewed  Review and summarize past medical records: yes  Independent visualization of images, tracings, or specimens: yes    Risk of Complications, Morbidity, and/or Mortality  Presenting problems: moderate  Management options: moderate                 Patient Care Considerations:    CT CHEST: I considered ordering a CT scan of the chest, however not emergently indicated can obtain as in patient if needed      Consultants/Shared Management Plan:    Hospitalist: I have discussed the case with Dr Barragan  who agrees to accept the patient for admission.    Social Determinants of Health:    Patient is independent, reliable, and has access to care.       Disposition and Care Coordination:    Admit:   Through independent evaluation of the patient's history, physical, and imperical data, the patient meets criteria for inpatient admission to the hospital.        Final diagnoses:   Hypervolemia, unspecified hypervolemia type   Acute on chronic congestive heart failure, unspecified heart failure type   Bronchitis        ED Disposition       ED Disposition   Decision to Admit    Condition   --    Comment   Level of Care: Telemetry [5]   Diagnosis: Volume overload [997612]   Admitting Physician: YOHANA CASANOVA [[id number]]   Attending Physician: YOHANA CASANOVA [[id number]]                 This medical record created using voice recognition software.             Ghassan Torres MD  04/15/24 0203

## 2024-04-15 NOTE — SIGNIFICANT NOTE
04/15/24 3096   Plan   Plan CCM, RN met with patient to introduce self and assess discharge needs.  Patient reports lives with spouse that can assist with needs.  Current with dialysis at [facility] on M,W,F.  Daughter provides transportation.  Patient is able to provides own ADL's.  3L home O2 from [facility].  Good family support.  Patient reports no need for home health.  No financial needs.  PCP and facesheet verified.  Plans to return home with family and no needs. [facility] in [place] at discharge.

## 2024-04-16 LAB
ALBUMIN SERPL-MCNC: 3.3 G/DL (ref 3.5–5.2)
ALBUMIN/GLOB SERPL: 1.4 G/DL
ALP SERPL-CCNC: 139 U/L (ref 39–117)
ALT SERPL W P-5'-P-CCNC: 10 U/L (ref 1–33)
ANION GAP SERPL CALCULATED.3IONS-SCNC: 8.4 MMOL/L (ref 5–15)
AST SERPL-CCNC: 14 U/L (ref 1–32)
BILIRUB SERPL-MCNC: 0.3 MG/DL (ref 0–1.2)
BUN SERPL-MCNC: 22 MG/DL (ref 8–23)
BUN/CREAT SERPL: 12.6 (ref 7–25)
CALCIUM SPEC-SCNC: 7.9 MG/DL (ref 8.6–10.5)
CHLORIDE SERPL-SCNC: 98 MMOL/L (ref 98–107)
CO2 SERPL-SCNC: 28.6 MMOL/L (ref 22–29)
CREAT SERPL-MCNC: 1.75 MG/DL (ref 0.57–1)
DEPRECATED RDW RBC AUTO: 52.6 FL (ref 37–54)
EGFRCR SERPLBLD CKD-EPI 2021: 27.9 ML/MIN/1.73
ERYTHROCYTE [DISTWIDTH] IN BLOOD BY AUTOMATED COUNT: 14.5 % (ref 12.3–15.4)
GLOBULIN UR ELPH-MCNC: 2.3 GM/DL
GLUCOSE SERPL-MCNC: 147 MG/DL (ref 65–99)
HCT VFR BLD AUTO: 27.1 % (ref 34–46.6)
HGB BLD-MCNC: 8.2 G/DL (ref 12–15.9)
INR PPP: 1.12 (ref 0.86–1.15)
MCH RBC QN AUTO: 30.1 PG (ref 26.6–33)
MCHC RBC AUTO-ENTMCNC: 30.3 G/DL (ref 31.5–35.7)
MCV RBC AUTO: 99.6 FL (ref 79–97)
PLATELET # BLD AUTO: 103 10*3/MM3 (ref 140–450)
PMV BLD AUTO: 9.6 FL (ref 6–12)
POTASSIUM SERPL-SCNC: 4.2 MMOL/L (ref 3.5–5.2)
PROT SERPL-MCNC: 5.6 G/DL (ref 6–8.5)
PROTHROMBIN TIME: 14.7 SECONDS (ref 11.8–14.9)
RBC # BLD AUTO: 2.72 10*6/MM3 (ref 3.77–5.28)
SODIUM SERPL-SCNC: 135 MMOL/L (ref 136–145)
WBC NRBC COR # BLD AUTO: 7.01 10*3/MM3 (ref 3.4–10.8)

## 2024-04-16 PROCEDURE — 25010000002 ONDANSETRON PER 1 MG: Performed by: INTERNAL MEDICINE

## 2024-04-16 PROCEDURE — 25010000002 HEPARIN (PORCINE) PER 1000 UNITS: Performed by: INTERNAL MEDICINE

## 2024-04-16 PROCEDURE — 94799 UNLISTED PULMONARY SVC/PX: CPT

## 2024-04-16 PROCEDURE — G0378 HOSPITAL OBSERVATION PER HR: HCPCS

## 2024-04-16 PROCEDURE — 96375 TX/PRO/DX INJ NEW DRUG ADDON: CPT

## 2024-04-16 PROCEDURE — 85610 PROTHROMBIN TIME: CPT | Performed by: INTERNAL MEDICINE

## 2024-04-16 PROCEDURE — G0257 UNSCHED DIALYSIS ESRD PT HOS: HCPCS

## 2024-04-16 PROCEDURE — 85027 COMPLETE CBC AUTOMATED: CPT | Performed by: INTERNAL MEDICINE

## 2024-04-16 PROCEDURE — 80053 COMPREHEN METABOLIC PANEL: CPT | Performed by: INTERNAL MEDICINE

## 2024-04-16 PROCEDURE — 97116 GAIT TRAINING THERAPY: CPT

## 2024-04-16 RX ADMIN — TRAMADOL HYDROCHLORIDE 50 MG: 50 TABLET ORAL at 08:03

## 2024-04-16 RX ADMIN — ONDANSETRON 4 MG: 2 INJECTION INTRAMUSCULAR; INTRAVENOUS at 13:36

## 2024-04-16 RX ADMIN — BUDESONIDE AND FORMOTEROL FUMARATE DIHYDRATE 2 PUFF: 160; 4.5 AEROSOL RESPIRATORY (INHALATION) at 06:39

## 2024-04-16 RX ADMIN — DOCUSATE SODIUM 50MG AND SENNOSIDES 8.6MG 2 TABLET: 8.6; 5 TABLET, FILM COATED ORAL at 13:36

## 2024-04-16 RX ADMIN — CARVEDILOL 25 MG: 25 TABLET, FILM COATED ORAL at 20:24

## 2024-04-16 RX ADMIN — ALBUTEROL SULFATE 2.5 MG: 2.5 SOLUTION RESPIRATORY (INHALATION) at 00:27

## 2024-04-16 RX ADMIN — LINAGLIPTIN 5 MG: 5 TABLET, FILM COATED ORAL at 14:35

## 2024-04-16 RX ADMIN — ROSUVASTATIN CALCIUM 20 MG: 20 TABLET, FILM COATED ORAL at 20:24

## 2024-04-16 RX ADMIN — SEVELAMER CARBONATE 800 MG: 800 TABLET, FILM COATED ORAL at 17:22

## 2024-04-16 RX ADMIN — TRAMADOL HYDROCHLORIDE 50 MG: 50 TABLET ORAL at 15:56

## 2024-04-16 RX ADMIN — CARVEDILOL 25 MG: 25 TABLET, FILM COATED ORAL at 13:36

## 2024-04-16 RX ADMIN — HEPARIN SODIUM 3400 UNITS: 1000 INJECTION INTRAVENOUS; SUBCUTANEOUS at 12:24

## 2024-04-16 RX ADMIN — Medication 10 ML: at 13:37

## 2024-04-16 RX ADMIN — BUDESONIDE AND FORMOTEROL FUMARATE DIHYDRATE 2 PUFF: 160; 4.5 AEROSOL RESPIRATORY (INHALATION) at 20:14

## 2024-04-16 RX ADMIN — HYDRALAZINE HYDROCHLORIDE 25 MG: 25 TABLET ORAL at 13:36

## 2024-04-16 RX ADMIN — CETIRIZINE HYDROCHLORIDE 10 MG: 10 TABLET, FILM COATED ORAL at 13:36

## 2024-04-16 RX ADMIN — SEVELAMER CARBONATE 800 MG: 800 TABLET, FILM COATED ORAL at 13:36

## 2024-04-16 RX ADMIN — Medication 10 ML: at 20:25

## 2024-04-16 RX ADMIN — PANTOPRAZOLE SODIUM 40 MG: 40 TABLET, DELAYED RELEASE ORAL at 06:01

## 2024-04-16 NOTE — PROGRESS NOTES
[facility]     Progress Note    Patient Name: Charlette Rai  : 1935  MRN: [id number]  Primary Care Physician:  Rafael Lyons MD  Date of admission: 2024      Subjective   Brief summary.  Patient admitted with volume overload and shortness of breath      HPI:  Feeling better seen in dialysis.  No new complaints    Review of Systems     No chest pain, some shortness of breath with exertion  No fever chill    Objective     Vitals:   Temp:  [97.5 °F (36.4 °C)-98.6 °F (37 °C)] 98.2 °F (36.8 °C)  Heart Rate:  [66-83] 70  Resp:  [16-20] 20  BP: (109-176)/(34-69) 112/34  Flow (L/min):  [2-3] 3    Physical Exam :     Female not in acute distress.  Heart regular.  Lungs clear.  Abdomen soft extremities no edema      Result Review:  I have personally reviewed the results from the time of this admission to 2024 16:36 EDT and agree with these findings:  []  Laboratory  []  Microbiology  []  Radiology  []  EKG/Telemetry   []  Cardiology/Vascular   []  Pathology  []  Old records  []  Other:           Assessment / Plan       Active Hospital Problems:  Active Hospital Problems    Diagnosis     Volume overload     Anemia of chronic disease     Essential hypertension     Chronic obstructive pulmonary disease     Type 2 diabetes mellitus        Plan:   Stable and improving, continue current meds.  Blood sugar stable.  Undergoing dialysis.  Will see recommendations, possible discharge today       DVT prophylaxis:  Medical and mechanical DVT prophylaxis orders are present.        CODE STATUS:   Level Of Support Discussed With: Patient  Code Status (Patient has no pulse and is not breathing): CPR (Attempt to Resuscitate)  Medical Interventions (Patient has pulse or is breathing): Full Support            Electronically signed by Rafael Lyons MD, 24, 4:36 PM EDT.

## 2024-04-16 NOTE — NURSING NOTE
Duration of Treatment 4.0 Hours                                     Ran entire treatment   Access Site Tunneled Dialysis Catheter          Lines reversed for entire treatment   Dialyzer Revaclear    mL/min   Dialysate Temperature (C) 36   BFR-As tolerated to a maximum of: 400 mL/min   Dialysate Solution Bath: K+ 3 mEq, Ca 2.5mEq   Bicarb 35 mEq   Na+ 137 meq   Fluid Removal: 3L                                                  Removed 2.9L; pt cramping           Patient completed 4 hour dialysis treatment. Set fluid removal to 3L but due to cramping, decreased UF goal to 2.9L. Lines ran reversed entire treatment. Post /49, HR 69. Patient tolerated well, no complaints.       Patient educated on fluid management.       Report given to primary RNBlanka.

## 2024-04-16 NOTE — PROGRESS NOTES
" LOS: 0 days   Patient Care Team:  Rafael Lyons MD as PCP - General (Internal Medicine)  Regis Mckeon MD as Consulting Physician (Radiation Oncology)  Carlton Casillas MD as Consulting Physician (Gastroenterology)  Susan Marcos APRN as Nurse Practitioner (Gastroenterology)    Chief Complaint: ESRD    Subjective     History of Present Illness  Pt seen on dialysis  No complaints  Breathing improved.    Subjective:  Symptoms:  No shortness of breath, chest pain, chest pressure or anxiety.    Diet:  No nausea or vomiting.        History taken from: patient chart family    Objective     Vital Sign Min/Max for last 24 hours  Temp  Min: 97.3 °F (36.3 °C)  Max: 98.6 °F (37 °C)   BP  Min: 150/44  Max: 176/66   Pulse  Min: 70  Max: 83   Resp  Min: 16  Max: 20   SpO2  Min: 97 %  Max: 100 %   Flow (L/min)  Min: 2  Max: 3   Weight  Min: 63.2 kg (139 lb 5.3 oz)  Max: 63.2 kg (139 lb 5.3 oz)     Flowsheet Rows      Flowsheet Row First Filed Value   Admission Height 165.1 cm (65\") Documented at 04/14/2024 2134   Admission Weight 69.5 kg (153 lb 3.5 oz) Documented at 04/14/2024 2134            No intake/output data recorded.  I/O last 3 completed shifts:  In: 630 [P.O.:630]  Out: 4000     Objective:  General Appearance:  Comfortable.    Vital signs: (most recent): Blood pressure 162/53, pulse 70, temperature 98.1 °F (36.7 °C), temperature source Oral, resp. rate 16, height 165.1 cm (65\"), weight 63.2 kg (139 lb 5.3 oz), SpO2 100%, not currently breastfeeding.  Vital signs are normal.    HEENT: Normal HEENT exam.    Lungs:  Normal effort and normal respiratory rate.  There are rhonchi.    Heart: Normal rate.  Regular rhythm.    Abdomen: Abdomen is soft.  Bowel sounds are normal.     Extremities: Normal range of motion.    Pulses: Distal pulses are intact.    Neurological: Patient is alert and oriented to person, place and time.    Pupils:  Pupils are equal, round, and reactive to light.    Skin:  Warm and dry.  " "              Results Review:     I reviewed the patient's new clinical results.  I reviewed the patient's new imaging results and agree with the interpretation.  I reviewed the patient's other test results and agree with the interpretation    WBC WBC   Date Value Ref Range Status   04/16/2024 7.01 3.40 - 10.80 10*3/mm3 Final   04/15/2024 5.95 3.40 - 10.80 10*3/mm3 Final   04/14/2024 7.58 3.40 - 10.80 10*3/mm3 Final      HGB Hemoglobin   Date Value Ref Range Status   04/16/2024 8.2 (L) 12.0 - 15.9 g/dL Final   04/15/2024 8.5 (L) 12.0 - 15.9 g/dL Final   04/14/2024 8.2 (L) 12.0 - 15.9 g/dL Final      HCT Hematocrit   Date Value Ref Range Status   04/16/2024 27.1 (L) 34.0 - 46.6 % Final   04/15/2024 29.5 (L) 34.0 - 46.6 % Final   04/14/2024 27.7 (L) 34.0 - 46.6 % Final      Platlets No results found for: \"LABPLAT\"   MCV MCV   Date Value Ref Range Status   04/16/2024 99.6 (H) 79.0 - 97.0 fL Final   04/15/2024 103.1 (H) 79.0 - 97.0 fL Final   04/14/2024 100.0 (H) 79.0 - 97.0 fL Final          Sodium Sodium   Date Value Ref Range Status   04/16/2024 135 (L) 136 - 145 mmol/L Final   04/15/2024 139 136 - 145 mmol/L Final   04/14/2024 140 136 - 145 mmol/L Final      Potassium Potassium   Date Value Ref Range Status   04/16/2024 4.2 3.5 - 5.2 mmol/L Final   04/15/2024 4.9 3.5 - 5.2 mmol/L Final   04/14/2024 4.8 3.5 - 5.2 mmol/L Final     Comment:     Slight hemolysis detected by analyzer. Result may be falsely elevated.      Chloride Chloride   Date Value Ref Range Status   04/16/2024 98 98 - 107 mmol/L Final   04/15/2024 104 98 - 107 mmol/L Final   04/14/2024 106 98 - 107 mmol/L Final      CO2 CO2   Date Value Ref Range Status   04/16/2024 28.6 22.0 - 29.0 mmol/L Final   04/15/2024 22.2 22.0 - 29.0 mmol/L Final   04/14/2024 23.9 22.0 - 29.0 mmol/L Final      BUN BUN   Date Value Ref Range Status   04/16/2024 22 8 - 23 mg/dL Final   04/15/2024 49 (H) 8 - 23 mg/dL Final   04/14/2024 43 (H) 8 - 23 mg/dL Final      Creatinine " "Creatinine   Date Value Ref Range Status   04/16/2024 1.75 (H) 0.57 - 1.00 mg/dL Final   04/15/2024 2.99 (H) 0.57 - 1.00 mg/dL Final   04/14/2024 2.97 (H) 0.57 - 1.00 mg/dL Final      Calcium Calcium   Date Value Ref Range Status   04/16/2024 7.9 (L) 8.6 - 10.5 mg/dL Final   04/15/2024 8.0 (L) 8.6 - 10.5 mg/dL Final   04/14/2024 8.1 (L) 8.6 - 10.5 mg/dL Final      PO4 No results found for: \"CAPO4\"   Albumin Albumin   Date Value Ref Range Status   04/16/2024 3.3 (L) 3.5 - 5.2 g/dL Final   04/15/2024 3.2 (L) 3.5 - 5.2 g/dL Final   04/14/2024 3.4 (L) 3.5 - 5.2 g/dL Final      Magnesium No results found for: \"MG\"   Uric Acid No results found for: \"URICACID\"     Medication Review:   budesonide-formoterol, 2 puff, Inhalation, BID - RT  carvedilol, 25 mg, Oral, BID With Meals  cetirizine, 10 mg, Oral, Daily  hydrALAZINE, 25 mg, Oral, Q12H  linagliptin, 5 mg, Oral, Daily  pantoprazole, 40 mg, Oral, Q AM  rosuvastatin, 20 mg, Oral, Nightly  senna-docusate sodium, 2 tablet, Oral, BID  sevelamer, 800 mg, Oral, TID With Meals  sodium chloride, 10 mL, Intravenous, Q12H  vitamin D, 50,000 Units, Oral, Q14 Days          Assessment & Plan       Chronic obstructive pulmonary disease    Essential hypertension    Type 2 diabetes mellitus    Anemia of chronic disease    Volume overload      Assessment & Plan  - ESRD, dialysis normally MWF schedule with evidence of volume overload.  Dialysis today and again tomorrow.  Continue reg schedule after that.  - Thrombosed left upper extremity AVG, status post fistulogram and angioplasty last week with right IJ TDC in place.  This was done at [facility].  - Shortness of breath with volume overload and known congestive heart failure.last 2D echo from October showed grade 1 impaired relaxation with hypertrophy moderate aortic stenosis is present.  Could consider SGLT2 inhibitors though experience with these medications in dialysis patient is relatively new.  - Anemia of CKD, hemoglobin " below goal.  On long-acting CASSANDRA as outpatient.  Continue the same.  - Left renal lesion, with recent spontaneous right perinephric subcapsular hematoma, needs urology evaluation outpatient.  - History of severe hypertension, blood pressure is acceptable.  - Type 2 diabetes with complications.  - Bilateral renal artery stenosis.  - History of lung cancer and COPD.    Elliott Aviles MD  04/16/24  09:17 EDT

## 2024-04-16 NOTE — PROGRESS NOTES
[facility]     Progress Note    Patient Name: Charlette Rai  : 1935  MRN: [id number]  Primary Care Physician:  Rafael Lyons MD  Date of admission: 2024    Subjective   Subjective     Chief Complaint: Patient is getting dialysis again we will  be discharged after evaluation by nephrology    HPI: Still continues to have shortness of air is being followed by the primary care physician as well    Review of Systems   All systems were reviewed and negative except for: Has been reviewed    Objective   Objective     Vitals:   Temp:  [97.3 °F (36.3 °C)-98.1 °F (36.7 °C)] 98.1 °F (36.7 °C)  Heart Rate:  [71-84] 73  Resp:  [16-20] 16  BP: (150-176)/(40-86) 157/56  Flow (L/min):  [2-3] 3    Physical Exam    Constitutional: Awake, alert   Eyes: PERRLA, sclerae anicteric, no conjunctival injection   HENT: NCAT, mucous membranes moist   Neck: Supple, no thyromegaly, no lymphadenopathy, trachea midline   Respiratory: Clear to auscultation bilaterally, nonlabored respirations    Cardiovascular: RRR, no murmurs, rubs, or gallops, palpable pedal pulses bilaterally   Gastrointestinal: Positive bowel sounds, soft, nontender, nondistended   Musculoskeletal: No bilateral ankle edema, no clubbing or cyanosis to extremities   Psychiatric: Appropriate affect, cooperative   Neurologic: Oriented x 3, strength symmetric in all extremities, Cranial Nerves grossly intact to confrontation, speech clear   Skin: No rashes   No change  Result Review    Result Review:  I have personally reviewed the results from the time of this admission to 2024 08:30 EDT and agree with these findings:  [x]  Laboratory  []  Microbiology  []  Radiology  []  EKG/Telemetry   []  Cardiology/Vascular   []  Pathology  []  Old records  []  Other:  Most notable findings include: Renal failure dialysis dependent    Assessment & Plan   Assessment / Plan     Brief Patient Summary:  Charlette Rai is a 89 y.o. female who continues to have nausea and  shortness of air    Active Hospital Problems:  Active Hospital Problems    Diagnosis     Volume overload     Anemia of chronic disease     Essential hypertension     Chronic obstructive pulmonary disease     Type 2 diabetes mellitus        Plan:   Continue as per nephrology with dialysis and other management    DVT prophylaxis:  Medical and mechanical DVT prophylaxis orders are present.        CODE STATUS:   Level Of Support Discussed With: Patient  Code Status (Patient has no pulse and is not breathing): CPR (Attempt to Resuscitate)  Medical Interventions (Patient has pulse or is breathing): Full Support    Disposition:  I expect patient to be discharged after she has been okayed for discharge by nephrology.    Electronically signed by Benji Barragan MD, 04/16/24, 8:30 AM EDT.      Part of this note may be an electronic transcription/translation of spoken language to printed text using the Dragon Dictation System.

## 2024-04-16 NOTE — PLAN OF CARE
Goal Outcome Evaluation:  Plan of Care Reviewed With: patient      2l nc. Medicated for pain x 1.

## 2024-04-16 NOTE — THERAPY TREATMENT NOTE
Acute Care - Physical Therapy Treatment Note  MAURICE Escamilla     Patient Name: Charlette Rai  : 1935  MRN: [id number]  Today's Date: 2024      Visit Dx:     ICD-10-CM ICD-9-CM   1. Hypervolemia, unspecified hypervolemia type  E87.70 276.69   2. Acute on chronic congestive heart failure, unspecified heart failure type  I50.9 428.0   3. Bronchitis  J40 490   4. Difficulty walking  R26.2 719.7     Patient Active Problem List   Diagnosis    Malignant neoplasm of upper lobe of right lung    Allergic rhinitis    Rheumatoid arthritis    Asthma    Chronic obstructive pulmonary disease    Essential hypertension    GERD (gastroesophageal reflux disease)    Hyperlipidemia LDL goal <70    Lumbar spinal stenosis    Migraine    Monoclonal paraproteinemia    Osteopenia    Oxygen dependent    Peripheral neuropathy    Stage 4 chronic kidney disease    Type 2 diabetes mellitus    Vitamin D deficiency    Carotid artery stenosis    Chronic right-sided thoracic back pain    Acute pain of left shoulder    Peripheral vascular disease of lower extremity    Edema    Ex-smoker    Peripheral vascular disease    De Quervain's tenosynovitis    Arthritis of carpometacarpal (CMC) joint of right thumb    Arthritis of right hand    Steal syndrome of dialysis vascular access    Anemia of chronic renal failure, stage 4 (severe)    Anemia of chronic disease    Aortic stenosis, moderate    Hematoma    ESRD (end stage renal disease)    CHF (congestive heart failure)    ESRD (end stage renal disease)    Volume overload     Past Medical History:   Diagnosis Date    Allergic rhinitis     Anemia     Arthritis     Asthma     USE INHALERS AND NEBULIZERS    Back pain     Bladder disorder     Cancer     LEFT LUNG CANCER  SURGERY AND CHEMO DONE  AND CURRENTLY   RIGHT LUNG CANCER HAS ONLY RECEIVED RADIATION THUS FAR    Chronic kidney disease     stage 3    CKD (chronic kidney disease) stage 4, GFR 15-29 ml/min     Condition not found      ulcer    Congestive heart failure (CHF)     FOLLOWED BY DR AQUINO. DENIES CP BUT DOES HAVE SOA CHRONIC ISSUE COPD/LUNG CANCER    COPD (chronic obstructive pulmonary disease)     FOLLOWED BY DR MARIAJOSE BAILEY    Coronary artery disease     DENIES CP BUT DOES GET SOA MOST OF THE TIME WITH EXERTION BUT OCC AT REST CHRONIC ISSUE COPD/LUNG CANCER    Deep vein thrombosis     Diabetes mellitus     DOES NOT CHECK BS DAILY    Disease of thyroid gland     HYPOTHYROIDISM    Essential hypertension     Gastric ulcer     GERD (gastroesophageal reflux disease)     Heart murmur     History of transfusion     NO ISSUES POST TRANSFUSION WAS MANY YEARS AGO    Hyperlipemia     Leukocytopenia     FOLLOWED BY DR MIR BAILEY    Limb swelling     Lumbago     Lumbar spinal stenosis     Lung cancer     Migraine headache     Multiple joint pain     On home oxygen therapy     3L/NC PRN    Osteopenia     Reflux esophagitis     Shortness of breath     Thyroid nodule     HAS ULTRASOUND YEARLY BEING MONITORED    Vascular disease     Vitamin D deficiency      Past Surgical History:   Procedure Laterality Date    ABDOMINAL SURGERY      APPENDECTOMY      ARTERIOVENOUS FISTULA/SHUNT SURGERY Left 05/10/2022    Procedure: Left basilic vein transposition;  Surgeon: Wan Barksdale MD;  Location: [facility] OR;  Service: Vascular;  Laterality: Left;    ARTERIOVENOUS FISTULA/SHUNT SURGERY Left 3/23/2023    Procedure: Ligation of left arm arteriovenous fistula;  Surgeon: Wan Barksdale MD;  Location: [facility] MAIN OR;  Service: Vascular;  Laterality: Left;    ARTERIOVENOUS FISTULA/SHUNT SURGERY Left 11/30/2023    Procedure: Creation of left arm arteriovenous graft;  Surgeon: Wan Barksdale MD;  Location: [facility] OR;  Service: Vascular;  Laterality: Left;    CARDIAC CATHETERIZATION  1996    CARDIAC SURGERY      CARDIAC SURGERY      fluid drained from heart    CATARACT EXTRACTION, BILATERAL  2003    COLONOSCOPY  2014    ENDOSCOPY  2016 2019    FEMORAL ARTERY  STENT Bilateral     HYSTERECTOMY      LUNG BIOPSY Left 2005    lobectomy upper lung caner    LUNG VOLUME REDUCTION      OTHER SURGICAL HISTORY      artifical joints/limbs    REPLACEMENT TOTAL KNEE Left 2016    UPPER GASTROINTESTINAL ENDOSCOPY       PT Assessment (Last 12 Hours)       PT Evaluation and Treatment       [facility] Name 04/16/24 1511          Physical Therapy Time and Intention    Subjective Information complains of;fatigue (P)   -NM     Document Type therapy note (daily note) (P)   -NM     Mode of Treatment individual therapy;physical therapy (P)   -NM     Patient Effort good (P)   -NM     Symptoms Noted During/After Treatment fatigue;shortness of breath (P)   -NM       [facility] Name 04/16/24 1511          General Information    Patient Profile Reviewed yes (P)   -NM     Patient Observations alert;cooperative;agree to therapy (P)   -NM     Barriers to Rehab none identified (P)   -NM       Row Name 04/16/24 1511          Pain    Pretreatment Pain Rating 0/10 - no pain (P)   -NM     Posttreatment Pain Rating 0/10 - no pain (P)   -NM       Row Name 04/16/24 1511          Transfers    Transfers sit-stand transfer;stand-sit transfer (P)   -NM       Row Name 04/16/24 1511          Sit-Stand Transfer    Sit-Stand [place] (Transfers) standby assist (P)   -NM     Assistive Device (Sit-Stand Transfers) walker, front-wheeled (P)   -NM       Row Name 04/16/24 1511          Stand-Sit Transfer    Stand-Sit [place] (Transfers) standby assist (P)   -NM     Assistive Device (Stand-Sit Transfers) walker, front-wheeled (P)   -NM       Row Name 04/16/24 1511          Gait/Stairs (Locomotion)    Gait/Stairs Locomotion gait/ambulation assistive device (P)   -NM     [place] Level (Gait) contact guard (P)   -NM     Assistive Device (Gait) walker, front-wheeled (P)   -NM     Patient was able to Ambulate yes (P)   -NM     Distance in Feet (Gait) 30 (P)   -NM     Pattern (Gait) step-to;step-through (P)   -NM      Deviations/Abnormal Patterns (Gait) bilateral deviations;base of support, narrow;vinnie decreased;gait speed decreased;stride length decreased (P)   -NM       Row Name 04/16/24 1511          Safety Issues, Functional Mobility    Impairments Affecting Function (Mobility) balance;endurance/activity tolerance;shortness of breath (P)   -NM       Row Name 04/16/24 1511          Balance    Balance Assessment standing dynamic balance (P)   -NM     Dynamic Standing Balance contact guard (P)   -NM     Position/Device Used, Standing Balance walker, front-wheeled (P)   -NM       Row Name             Wound 11/30/23 1803 Left antecubital Incision    Wound - Properties Group Placement Date: 11/30/23 -HG Placement Time: 1803 -HG Side: Left  -HG Location: antecubital  -HG Primary Wound Type: Incision  -HG    Retired Wound - Properties Group Placement Date: 11/30/23 -HG Placement Time: 1803  -HG Side: Left  -HG Location: antecubital  -HG Primary Wound Type: Incision  -HG    Retired Wound - Properties Group Date first assessed: 11/30/23 -HG Time first assessed: 1803 -HG Side: Left  -HG Location: antecubital  -HG Primary Wound Type: Incision  -HG      Row Name 04/16/24 1511          Positioning and Restraints    Pre-Treatment Position sitting in chair/recliner (P)   -NM     Post Treatment Position chair (P)   -NM     In Chair reclined;call light within reach;encouraged to call for assist;exit alarm on;with family/caregiver (P)   -NM       Row Name 04/16/24 1511          Progress Summary (PT)    Progress Toward Functional Goals (PT) progress toward functional goals as expected (P)   -NM     Daily Progress Summary (PT) Pt ambulated 30ft today with SOA and endurance deficits noted. Pt will continue to benefit from skilled PT to address benefits. (P)   -NM               User Key  (r) = Recorded By, (t) = Taken By, (c) = Cosigned By      Initials Name Provider Type     Berlin Vale, RN Registered Nurse    Abelardo Ferrell,  PT Student PT Student                    Physical Therapy Education       Title: PT OT SLP Therapies (In Progress)       Topic: Physical Therapy (In Progress)       Point: Mobility training (Done)       Learning Progress Summary             Patient Acceptance, E,TB, VU by AV at 4/15/2024 1436                         Point: Home exercise program (Not Started)       Learner Progress:  Not documented in this visit.              Point: Body mechanics (Done)       Learning Progress Summary             Patient Acceptance, E,TB, VU by AV at 4/15/2024 1436                         Point: Precautions (Done)       Learning Progress Summary             Patient Acceptance, E,TB, VU by AV at 4/15/2024 1436                                         User Key       Initials Effective Dates Name Provider Type Discipline    AV 06/11/21 -  Abdi Odom, PT Physical Therapist PT                  PT Recommendation and Plan     Progress Summary (PT)  Progress Toward Functional Goals (PT): (P) progress toward functional goals as expected  Daily Progress Summary (PT): (P) Pt ambulated 30ft today with SOA and endurance deficits noted. Pt will continue to benefit from skilled PT to address benefits.   Outcome Measures       Row Name 04/16/24 1500 04/15/24 1400          How much help from another person do you currently need...    Turning from your back to your side while in flat bed without using bedrails? 4 (P)   -NM 4  -AV     Moving from lying on back to sitting on the side of a flat bed without bedrails? 4 (P)   -NM 4  -AV     Moving to and from a bed to a chair (including a wheelchair)? 3 (P)   -NM 3  -AV     Standing up from a chair using your arms (e.g., wheelchair, bedside chair)? 3 (P)   -NM 3  -AV     Climbing 3-5 steps with a railing? 3 (P)   -NM 3  -AV     To walk in hospital room? 3 (P)   -NM 3  -AV     AM-PAC 6 Clicks Score (PT) 20 (P)   -NM 20  -AV     Highest Level of Mobility Goal 6 --> Walk 10 steps or more (P)   -NM 6  --> Walk 10 steps or more  -AV        Functional Assessment    Outcome Measure Options -- AM-PAC 6 Clicks Basic Mobility (PT)  -AV               User Key  (r) = Recorded By, (t) = Taken By, (c) = Cosigned By      Initials Name Provider Type    AV Abdi Odom, PT Physical Therapist    Abelardo Ferrell, PT Student PT Student                     Time Calculation:    PT Charges       Row Name 04/16/24 1510             Time Calculation    PT Received On 04/16/24 (P)   -NM         Timed Charges    63248 - Gait Training Minutes  8 (P)   -NM      63974 - PT Therapeutic Activity Minutes 5 (P)   -NM         Total Minutes    Timed Charges Total Minutes 13 (P)   -NM       Total Minutes 13 (P)   -NM                User Key  (r) = Recorded By, (t) = Taken By, (c) = Cosigned By      Initials Name Provider Type    Abelardo Ferrell, PT Student PT Student                  Therapy Charges for Today       Code Description Service Date Service Provider Modifiers Qty    [id number] HC GAIT TRAINING EA 15 MIN 4/16/2024 Abelardo Zepeda, PT Student GP 1            PT G-Codes  Outcome Measure Options: AM-PAC 6 Clicks Basic Mobility (PT)  AM-PAC 6 Clicks Score (PT): (P) 20    Abelardo Zepeda PT Student  4/16/2024

## 2024-04-17 ENCOUNTER — READMISSION MANAGEMENT (OUTPATIENT)
Dept: CALL CENTER | Facility: HOSPITAL | Age: 89
End: 2024-04-17
Payer: MEDICARE

## 2024-04-17 ENCOUNTER — APPOINTMENT (OUTPATIENT)
Dept: GENERAL RADIOLOGY | Facility: HOSPITAL | Age: 89
End: 2024-04-17
Payer: MEDICARE

## 2024-04-17 ENCOUNTER — HOSPITAL ENCOUNTER (EMERGENCY)
Facility: HOSPITAL | Age: 89
Discharge: HOME | End: 2024-04-17
Attending: EMERGENCY MEDICINE
Payer: MEDICARE

## 2024-04-17 VITALS
HEART RATE: 74 BPM | DIASTOLIC BLOOD PRESSURE: 44 MMHG | BODY MASS INDEX: 21.67 KG/M2 | TEMPERATURE: 97.4 F | SYSTOLIC BLOOD PRESSURE: 124 MMHG | HEIGHT: 65 IN | WEIGHT: 130.07 LBS | OXYGEN SATURATION: 97 % | RESPIRATION RATE: 14 BRPM

## 2024-04-17 VITALS
DIASTOLIC BLOOD PRESSURE: 49 MMHG | BODY MASS INDEX: 21.38 KG/M2 | HEART RATE: 75 BPM | TEMPERATURE: 97.5 F | SYSTOLIC BLOOD PRESSURE: 135 MMHG | RESPIRATION RATE: 18 BRPM | WEIGHT: 128.31 LBS | HEIGHT: 65 IN | OXYGEN SATURATION: 100 %

## 2024-04-17 DIAGNOSIS — J40 BRONCHITIS: ICD-10-CM

## 2024-04-17 DIAGNOSIS — R53.1 WEAKNESS: Primary | ICD-10-CM

## 2024-04-17 LAB
ALBUMIN SERPL-MCNC: 3.9 G/DL (ref 3.5–5.2)
ALBUMIN/GLOB SERPL: 1.5 G/DL
ALP SERPL-CCNC: 159 U/L (ref 39–117)
ALT SERPL W P-5'-P-CCNC: 15 U/L (ref 1–33)
ANION GAP SERPL CALCULATED.3IONS-SCNC: 12.4 MMOL/L (ref 5–15)
AST SERPL-CCNC: 22 U/L (ref 1–32)
BASOPHILS # BLD AUTO: 0.03 10*3/MM3 (ref 0–0.2)
BASOPHILS NFR BLD AUTO: 0.3 % (ref 0–1.5)
BILIRUB SERPL-MCNC: 0.4 MG/DL (ref 0–1.2)
BUN SERPL-MCNC: 16 MG/DL (ref 8–23)
BUN/CREAT SERPL: 8.6 (ref 7–25)
CALCIUM SPEC-SCNC: 8.7 MG/DL (ref 8.6–10.5)
CHLORIDE SERPL-SCNC: 98 MMOL/L (ref 98–107)
CO2 SERPL-SCNC: 25.6 MMOL/L (ref 22–29)
CREAT SERPL-MCNC: 1.86 MG/DL (ref 0.57–1)
DEPRECATED RDW RBC AUTO: 54 FL (ref 37–54)
EGFRCR SERPLBLD CKD-EPI 2021: 25.9 ML/MIN/1.73
EOSINOPHIL # BLD AUTO: 0.06 10*3/MM3 (ref 0–0.4)
EOSINOPHIL NFR BLD AUTO: 0.6 % (ref 0.3–6.2)
ERYTHROCYTE [DISTWIDTH] IN BLOOD BY AUTOMATED COUNT: 14.6 % (ref 12.3–15.4)
GEN 5 2HR TROPONIN T REFLEX: 64 NG/L
GLOBULIN UR ELPH-MCNC: 2.6 GM/DL
GLUCOSE SERPL-MCNC: 135 MG/DL (ref 65–99)
HCT VFR BLD AUTO: 33 % (ref 34–46.6)
HGB BLD-MCNC: 9.9 G/DL (ref 12–15.9)
HOLD SPECIMEN: NORMAL
HOLD SPECIMEN: NORMAL
IMM GRANULOCYTES # BLD AUTO: 0.04 10*3/MM3 (ref 0–0.05)
IMM GRANULOCYTES NFR BLD AUTO: 0.4 % (ref 0–0.5)
LYMPHOCYTES # BLD AUTO: 0.71 10*3/MM3 (ref 0.7–3.1)
LYMPHOCYTES NFR BLD AUTO: 7.2 % (ref 19.6–45.3)
MCH RBC QN AUTO: 30.3 PG (ref 26.6–33)
MCHC RBC AUTO-ENTMCNC: 30 G/DL (ref 31.5–35.7)
MCV RBC AUTO: 100.9 FL (ref 79–97)
MONOCYTES # BLD AUTO: 0.81 10*3/MM3 (ref 0.1–0.9)
MONOCYTES NFR BLD AUTO: 8.2 % (ref 5–12)
NEUTROPHILS NFR BLD AUTO: 8.19 10*3/MM3 (ref 1.7–7)
NEUTROPHILS NFR BLD AUTO: 83.3 % (ref 42.7–76)
NRBC BLD AUTO-RTO: 0 /100 WBC (ref 0–0.2)
NT-PROBNP SERPL-MCNC: ABNORMAL PG/ML (ref 0–1800)
PLATELET # BLD AUTO: 112 10*3/MM3 (ref 140–450)
PMV BLD AUTO: 10.5 FL (ref 6–12)
POTASSIUM SERPL-SCNC: 4.5 MMOL/L (ref 3.5–5.2)
PROT SERPL-MCNC: 6.5 G/DL (ref 6–8.5)
RBC # BLD AUTO: 3.27 10*6/MM3 (ref 3.77–5.28)
SODIUM SERPL-SCNC: 136 MMOL/L (ref 136–145)
TROPONIN T DELTA: -4 NG/L
TROPONIN T SERPL HS-MCNC: 68 NG/L
WBC NRBC COR # BLD AUTO: 9.84 10*3/MM3 (ref 3.4–10.8)
WHOLE BLOOD HOLD COAG: NORMAL
WHOLE BLOOD HOLD SPECIMEN: NORMAL

## 2024-04-17 PROCEDURE — 84484 ASSAY OF TROPONIN QUANT: CPT

## 2024-04-17 PROCEDURE — 85025 COMPLETE CBC W/AUTO DIFF WBC: CPT

## 2024-04-17 PROCEDURE — G0378 HOSPITAL OBSERVATION PER HR: HCPCS

## 2024-04-17 PROCEDURE — G0257 UNSCHED DIALYSIS ESRD PT HOS: HCPCS

## 2024-04-17 PROCEDURE — 94799 UNLISTED PULMONARY SVC/PX: CPT

## 2024-04-17 PROCEDURE — 93005 ELECTROCARDIOGRAM TRACING: CPT | Performed by: EMERGENCY MEDICINE

## 2024-04-17 PROCEDURE — 94664 DEMO&/EVAL PT USE INHALER: CPT

## 2024-04-17 PROCEDURE — 71045 X-RAY EXAM CHEST 1 VIEW: CPT

## 2024-04-17 PROCEDURE — 99284 EMERGENCY DEPT VISIT MOD MDM: CPT

## 2024-04-17 PROCEDURE — 25810000003 SODIUM CHLORIDE 0.9 % SOLUTION: Performed by: EMERGENCY MEDICINE

## 2024-04-17 PROCEDURE — 25010000002 HEPARIN (PORCINE) PER 1000 UNITS: Performed by: INTERNAL MEDICINE

## 2024-04-17 PROCEDURE — 84484 ASSAY OF TROPONIN QUANT: CPT | Performed by: EMERGENCY MEDICINE

## 2024-04-17 PROCEDURE — 36415 COLL VENOUS BLD VENIPUNCTURE: CPT

## 2024-04-17 PROCEDURE — 80053 COMPREHEN METABOLIC PANEL: CPT

## 2024-04-17 PROCEDURE — 93005 ELECTROCARDIOGRAM TRACING: CPT

## 2024-04-17 PROCEDURE — 83880 ASSAY OF NATRIURETIC PEPTIDE: CPT

## 2024-04-17 RX ORDER — DOXYCYCLINE 100 MG/1
100 CAPSULE ORAL 2 TIMES DAILY
Qty: 14 CAPSULE | Refills: 0 | Status: SHIPPED | OUTPATIENT
Start: 2024-04-17 | End: 2024-04-29 | Stop reason: HOSPADM

## 2024-04-17 RX ORDER — SODIUM CHLORIDE 0.9 % (FLUSH) 0.9 %
10 SYRINGE (ML) INJECTION AS NEEDED
Status: DISCONTINUED | OUTPATIENT
Start: 2024-04-17 | End: 2024-04-18 | Stop reason: HOSPADM

## 2024-04-17 RX ADMIN — DOCUSATE SODIUM 50MG AND SENNOSIDES 8.6MG 2 TABLET: 8.6; 5 TABLET, FILM COATED ORAL at 13:15

## 2024-04-17 RX ADMIN — TRAMADOL HYDROCHLORIDE 50 MG: 50 TABLET ORAL at 00:07

## 2024-04-17 RX ADMIN — HYDRALAZINE HYDROCHLORIDE 25 MG: 25 TABLET ORAL at 13:15

## 2024-04-17 RX ADMIN — HEPARIN SODIUM 3400 UNITS: 1000 INJECTION INTRAVENOUS; SUBCUTANEOUS at 11:59

## 2024-04-17 RX ADMIN — SODIUM CHLORIDE 1000 ML: 9 INJECTION, SOLUTION INTRAVENOUS at 20:00

## 2024-04-17 RX ADMIN — BUDESONIDE AND FORMOTEROL FUMARATE DIHYDRATE 2 PUFF: 160; 4.5 AEROSOL RESPIRATORY (INHALATION) at 06:47

## 2024-04-17 RX ADMIN — PANTOPRAZOLE SODIUM 40 MG: 40 TABLET, DELAYED RELEASE ORAL at 05:05

## 2024-04-17 RX ADMIN — CARVEDILOL 25 MG: 25 TABLET, FILM COATED ORAL at 13:14

## 2024-04-17 RX ADMIN — SEVELAMER CARBONATE 800 MG: 800 TABLET, FILM COATED ORAL at 13:15

## 2024-04-17 RX ADMIN — TRAMADOL HYDROCHLORIDE 50 MG: 50 TABLET ORAL at 13:16

## 2024-04-17 RX ADMIN — LINAGLIPTIN 5 MG: 5 TABLET, FILM COATED ORAL at 13:15

## 2024-04-17 RX ADMIN — CETIRIZINE HYDROCHLORIDE 10 MG: 10 TABLET, FILM COATED ORAL at 13:13

## 2024-04-17 RX ADMIN — Medication 10 ML: at 13:20

## 2024-04-17 NOTE — DISCHARGE SUMMARY
[facility]         DISCHARGE SUMMARY    Patient Name: Charlette Rai  : 1935  MRN: [id number]    Date of Admission: 2024  Date of Discharge: 2024  Primary Care Physician: Rafael Lyons MD    Consults       Date and Time Order Name Status Description    4/15/2024  2:37 PM Inpatient Internal Medicine Consult      4/15/2024  7:53 AM Inpatient Nephrology Consult Completed     4/15/2024 12:49 AM IP General Consult (Use specialty-specific consult if known)              Presenting Problem:   Bronchitis [J40]  Volume overload [E87.70]  Hypervolemia, unspecified hypervolemia type [E87.70]  Acute on chronic congestive heart failure, unspecified heart failure type [I50.9]    Active and Resolved Hospital Problems:  Active Hospital Problems    Diagnosis POA    Volume overload [E87.70] Yes    Anemia of chronic disease [D63.8] Yes    Essential hypertension [I10] Yes    Chronic obstructive pulmonary disease [J44.9] Yes    Type 2 diabetes mellitus [E11.9] Yes      Resolved Hospital Problems   No resolved problems to display.         Hospital Course     Hospital Course:  Charlette Rai is a 89 y.o. female who was admitted because of fluid overload and required dialysis she received 3 days of dialysis I have discussed the case today with nephrology and they feel that she can be discharged today      DISCHARGE Follow Up Recommendations for labs and diagnostics: Patient was admitted with fluid overload she is has end-stage kidney disease and missed her dialysis 45      Pertinent  and/or Most Recent Results     LAB RESULTS:      Lab 24  0421 04/15/24  0837 04/15/24  0132 24  2153 24  0000   WBC 7.01 5.95  --  7.58  --    HEMOGLOBIN 8.2* 8.5*  --  8.2* 8.8*  26.4*   HEMATOCRIT 27.1* 29.5*  --  27.7*  --    PLATELETS 103* 99*  --  95*  --    NEUTROS ABS  --   --   --  5.87  --    IMMATURE GRANS (ABS)  --   --   --  0.02  --    LYMPHS ABS  --   --   --  0.69*  --    MONOS ABS  --    --   --  0.76  --    EOS ABS  --   --   --  0.22  --    MCV 99.6* 103.1*  --  100.0*  --    LACTATE  --   --  0.6  --   --    PROTIME 14.7  --   --   --   --          Lab 04/16/24  0421 04/15/24  0837 04/14/24 2153 04/11/24  0912   SODIUM 135* 139 140  --    POTASSIUM 4.2 4.9 4.8 4.5   CHLORIDE 98 104 106  --    CO2 28.6 22.2 23.9  --    ANION GAP 8.4 12.8 10.1  --    BUN 22 49* 43*  --    CREATININE 1.75* 2.99* 2.97*  --    EGFR 27.9* 14.7* 14.8*  --    GLUCOSE 147* 208* 114*  --    CALCIUM 7.9* 8.0* 8.1*  --          Lab 04/16/24  0421 04/15/24  0837 04/14/24 2153   TOTAL PROTEIN 5.6* 5.7* 5.7*   ALBUMIN 3.3* 3.2* 3.4*   GLOBULIN 2.3 2.5 2.3   ALT (SGPT) 10 11 11   AST (SGOT) 14 15 18   BILIRUBIN 0.3 0.2 0.3   ALK PHOS 139* 149* 141*         Lab 04/16/24  0421 04/15/24  0001 04/14/24 2153   PROBNP  --   --  25,035.0*   HSTROP T  --  64* 64*   PROTIME 14.7  --   --    INR 1.12  --   --                  Brief Urine Lab Results  (Last result in the past 365 days)        Color   Clarity   Blood   Leuk Est   Nitrite   Protein   CREAT   Urine HCG        03/03/24 1131 Yellow   Turbid   Moderate (2+)   Moderate (2+)   Negative   >=300 mg/dL (3+)                 Microbiology Results (last 10 days)       Procedure Component Value - Date/Time    Blood Culture - Blood, Arm, Left [[id number]]  (Normal) Collected: 04/15/24 0132    Lab Status: Preliminary result Specimen: Blood from Arm, Left Updated: 04/17/24 0145     Blood Culture No growth at 2 days    Blood Culture - Blood, Arm, Right [[id number]]  (Normal) Collected: 04/15/24 0132    Lab Status: Preliminary result Specimen: Blood from Arm, Right Updated: 04/17/24 0145     Blood Culture No growth at 2 days            XR Chest 1 View    Result Date: 4/14/2024  Impression: Stable appearance of the chest with no definite acute findings.  Electronically Signed By-Dr. Weston Castillo MD On:4/14/2024 10:38 PM      XR Chest 1 View    Result Date: 4/1/2024  Impression: IMPRESSION :   1. Mild prominence of the interstitial markings again noted slightly increased in the comparison. Findings represent underlying interstitial edema given findings on recent CT. Superimposed pneumonia is not excluded. [  Electronically Signed By-Eleazar Burris On:4/1/2024 9:20 AM       Results for orders placed during the hospital encounter of 03/13/24    Duplex Venous Lower Extremity - Right CAR    Interpretation Summary    Normal right lower extremity venous duplex scan.      Results for orders placed during the hospital encounter of 03/13/24    Duplex Venous Lower Extremity - Right CAR    Interpretation Summary    Normal right lower extremity venous duplex scan.      Results for orders placed during the hospital encounter of 10/29/23    Adult Transthoracic Echo Complete W/ Cont if Necessary Per Protocol    Interpretation Summary    Left ventricular systolic function is normal. Calculated left ventricular EF = 56.8%    Left ventricular wall thickness is consistent with mild asymmetric hypertrophy.    Left ventricular diastolic function is consistent with (grade I) impaired relaxation.    Left atrial volume is moderately increased.    Moderate aortic valve stenosis is present.    Estimated right ventricular systolic pressure from tricuspid regurgitation is normal (<35 mmHg).    Mild dilation of the aortic root is present.      Labs Pending at Discharge:  Pending Labs       Order Current Status    Blood Culture - Blood, Arm, Left Preliminary result    Blood Culture - Blood, Arm, Right Preliminary result              Discharge Details        Discharge Medications        Changes to Medications        Instructions Start Date   levocetirizine 5 MG tablet  Commonly known as: XYZAL  What changed: when to take this   TAKE 1 TABLET DAILY             Continue These Medications        Instructions Start Date   albuterol sulfate  (90 Base) MCG/ACT inhaler  Commonly known as: PROVENTIL HFA;VENTOLIN HFA;PROAIR HFA   2  puffs, Inhalation, Every 4 Hours PRN      budesonide-formoterol 160-4.5 MCG/ACT inhaler  Commonly known as: SYMBICORT   2 puffs, Inhalation, 2 Times Daily - RT      carvedilol 25 MG tablet  Commonly known as: Coreg   25 mg, Oral, 2 Times Daily With Meals      dexlansoprazole 60 MG capsule  Commonly known as: DEXILANT   60 mg, Oral, Daily      ergocalciferol 1.25 MG (36155 UT) capsule  Commonly known as: ERGOCALCIFEROL   50,000 Units, Oral, Every 14 Days      hydrALAZINE 25 MG tablet  Commonly known as: APRESOLINE   25 mg, Oral, Every 12 Hours Scheduled      linagliptin 5 MG tablet tablet  Commonly known as: Tradjenta   5 mg, Oral, Daily      nitroglycerin 0.4 MG SL tablet  Commonly known as: NITROSTAT   Place 1 tablet under the tongue Every 5 (Five) Minutes As Needed for Chest Pain.      polycarbophil 625 MG tablet tablet   625 mg, Oral, Daily PRN      rosuvastatin 20 MG tablet  Commonly known as: CRESTOR   20 mg, Oral, Daily      sevelamer 800 MG tablet  Commonly known as: RENVELA   800 mg, Oral, 3 Times Daily With Meals      traMADol 50 MG tablet  Commonly known as: ULTRAM   50 mg, Oral, Every 8 Hours PRN      Trulicity 1.5 MG/0.5ML solution pen-injector  Generic drug: Dulaglutide   Inject 1.5 mg under the skin into the appropriate area as directed Every 14 (Fourteen) Days.               No Known Allergies      Discharge Disposition:  Home or Self Care    Diet:  Hospital:  Diet Order   Procedures    Diet: Renal, Fluid Restriction (240 mL/tray); Low Sodium (2-3g), Low Potassium, Low Phosphorus; 1500 mL/day; Fluid Consistency: Thin (IDDSI 0)         Discharge Activity: As tolerated        CODE STATUS:  Code Status and Medical Interventions:   Ordered at: 04/15/24 0753     Level Of Support Discussed With:    Patient     Code Status (Patient has no pulse and is not breathing):    CPR (Attempt to Resuscitate)     Medical Interventions (Patient has pulse or is breathing):    Full Support         Future Appointments    Date Time Provider Department Center   5/16/2024  3:15 PM Jazzy Addison APRN [facility] [place]           Time spent on Discharge including face to face service: 45 minutes    Electronically signed by Benji Barragan MD, 04/17/24, 8:06 AM EDT.    Part of this note may be an electronic transcription/translation of spoken language to printed text using the Dragon Dictation System.

## 2024-04-17 NOTE — PLAN OF CARE
Goal Outcome Evaluation:         Pt breathing without difficulty/ weight at base line.  Goals met.

## 2024-04-17 NOTE — ED NOTES
Patients map was below 65 on automatic cuff. I took a manual and map was 69. I spoke with Dylan RODARTE and he gave me a verbal order for 1000ml fluid bolus for patient.

## 2024-04-17 NOTE — PROGRESS NOTES
" LOS: 0 days   Patient Care Team:  Rafael Lyons MD as PCP - General (Internal Medicine)  Regis Mckeon MD as Consulting Physician (Radiation Oncology)  Carlton Casillas MD as Consulting Physician (Gastroenterology)  Susan Marcos APRN as Nurse Practitioner (Gastroenterology)    Chief Complaint: ESRD    Subjective     History of Present Illness  Pt seen on dialysis.  Less short of breath.  Tolerating p.o. but poor appetite.  No complaints    Subjective:  Symptoms:  Improved.  No shortness of breath, chest pain, chest pressure or anxiety.    Diet:  Poor intake.  No nausea or vomiting.    Activity level: Impaired due to weakness.        History taken from: patient chart family    Objective     Vital Sign Min/Max for last 24 hours  Temp  Min: 97.2 °F (36.2 °C)  Max: 98.2 °F (36.8 °C)   BP  Min: 112/34  Max: 165/48   Pulse  Min: 64  Max: 76   Resp  Min: 18  Max: 20   SpO2  Min: 96 %  Max: 100 %   Flow (L/min)  Min: 3  Max: 3   Weight  Min: 58.2 kg (128 lb 4.9 oz)  Max: 60.5 kg (133 lb 6.1 oz)     Flowsheet Rows      Flowsheet Row First Filed Value   Admission Height 165.1 cm (65\") Documented at 04/14/2024 2134   Admission Weight 69.5 kg (153 lb 3.5 oz) Documented at 04/14/2024 2134            I/O this shift:  In: -   Out: 2000   I/O last 3 completed shifts:  In: 1020 [P.O.:1020]  Out: 7100 [Urine:200]    Objective:  General Appearance:  Comfortable and in no acute distress (8 send left upper extremity AVG and right IJ TDC).    Vital signs: (most recent): Blood pressure 135/49, pulse 75, temperature 97.5 °F (36.4 °C), temperature source Oral, resp. rate 18, height 165.1 cm (65\"), weight 58.2 kg (128 lb 4.9 oz), SpO2 100%, not currently breastfeeding.  Vital signs are normal.  No fever.    Output: Producing stool.    HEENT: Normal HEENT exam.    Lungs:  Normal effort and normal respiratory rate.  She is not in respiratory distress.    Heart: Normal rate.  Regular rhythm.    Abdomen: Abdomen is soft.  Bowel " "sounds are normal.   There is no abdominal tenderness.     Extremities: Normal range of motion.    Pulses: Distal pulses are intact.    Neurological: Patient is alert and oriented to person, place and time.    Pupils:  Pupils are equal, round, and reactive to light.    Skin:  Warm and dry.                Results Review:     I reviewed the patient's new clinical results.  I reviewed the patient's new imaging results and agree with the interpretation.  I reviewed the patient's other test results and agree with the interpretation    WBC WBC   Date Value Ref Range Status   04/16/2024 7.01 3.40 - 10.80 10*3/mm3 Final   04/15/2024 5.95 3.40 - 10.80 10*3/mm3 Final   04/14/2024 7.58 3.40 - 10.80 10*3/mm3 Final      HGB Hemoglobin   Date Value Ref Range Status   04/16/2024 8.2 (L) 12.0 - 15.9 g/dL Final   04/15/2024 8.5 (L) 12.0 - 15.9 g/dL Final   04/14/2024 8.2 (L) 12.0 - 15.9 g/dL Final      HCT Hematocrit   Date Value Ref Range Status   04/16/2024 27.1 (L) 34.0 - 46.6 % Final   04/15/2024 29.5 (L) 34.0 - 46.6 % Final   04/14/2024 27.7 (L) 34.0 - 46.6 % Final      Platlets No results found for: \"LABPLAT\"   MCV MCV   Date Value Ref Range Status   04/16/2024 99.6 (H) 79.0 - 97.0 fL Final   04/15/2024 103.1 (H) 79.0 - 97.0 fL Final   04/14/2024 100.0 (H) 79.0 - 97.0 fL Final          Sodium Sodium   Date Value Ref Range Status   04/16/2024 135 (L) 136 - 145 mmol/L Final   04/15/2024 139 136 - 145 mmol/L Final   04/14/2024 140 136 - 145 mmol/L Final      Potassium Potassium   Date Value Ref Range Status   04/16/2024 4.2 3.5 - 5.2 mmol/L Final   04/15/2024 4.9 3.5 - 5.2 mmol/L Final   04/14/2024 4.8 3.5 - 5.2 mmol/L Final     Comment:     Slight hemolysis detected by analyzer. Result may be falsely elevated.      Chloride Chloride   Date Value Ref Range Status   04/16/2024 98 98 - 107 mmol/L Final   04/15/2024 104 98 - 107 mmol/L Final   04/14/2024 106 98 - 107 mmol/L Final      CO2 CO2   Date Value Ref Range Status " "  04/16/2024 28.6 22.0 - 29.0 mmol/L Final   04/15/2024 22.2 22.0 - 29.0 mmol/L Final   04/14/2024 23.9 22.0 - 29.0 mmol/L Final      BUN BUN   Date Value Ref Range Status   04/16/2024 22 8 - 23 mg/dL Final   04/15/2024 49 (H) 8 - 23 mg/dL Final   04/14/2024 43 (H) 8 - 23 mg/dL Final      Creatinine Creatinine   Date Value Ref Range Status   04/16/2024 1.75 (H) 0.57 - 1.00 mg/dL Final   04/15/2024 2.99 (H) 0.57 - 1.00 mg/dL Final   04/14/2024 2.97 (H) 0.57 - 1.00 mg/dL Final      Calcium Calcium   Date Value Ref Range Status   04/16/2024 7.9 (L) 8.6 - 10.5 mg/dL Final   04/15/2024 8.0 (L) 8.6 - 10.5 mg/dL Final   04/14/2024 8.1 (L) 8.6 - 10.5 mg/dL Final      PO4 No results found for: \"CAPO4\"   Albumin Albumin   Date Value Ref Range Status   04/16/2024 3.3 (L) 3.5 - 5.2 g/dL Final   04/15/2024 3.2 (L) 3.5 - 5.2 g/dL Final   04/14/2024 3.4 (L) 3.5 - 5.2 g/dL Final      Magnesium No results found for: \"MG\"   Uric Acid No results found for: \"URICACID\"     Medication Review:   budesonide-formoterol, 2 puff, Inhalation, BID - RT  carvedilol, 25 mg, Oral, BID With Meals  cetirizine, 10 mg, Oral, Daily  hydrALAZINE, 25 mg, Oral, Q12H  linagliptin, 5 mg, Oral, Daily  pantoprazole, 40 mg, Oral, Q AM  rosuvastatin, 20 mg, Oral, Nightly  senna-docusate sodium, 2 tablet, Oral, BID  sevelamer, 800 mg, Oral, TID With Meals  sodium chloride, 10 mL, Intravenous, Q12H  vitamin D, 50,000 Units, Oral, Q14 Days          Assessment & Plan       Chronic obstructive pulmonary disease    Essential hypertension    Type 2 diabetes mellitus    Anemia of chronic disease    Volume overload      Assessment & Plan  - ESRD, dialysis normally MWF schedule with evidence of volume overload.  Dialysis today, back on routine schedule.  Okay to discharge after dialysis today.  Lower dry weight to her standing scale weight after dialysis today (58.5kg).  - Thrombosed left upper extremity AVG, status post fistulogram and angioplasty last week with right " IJ TDC in place.  This was done at [facility].  - Shortness of breath with volume overload and known congestive heart failure. Last 2D echo from October showed grade 1 impaired relaxation with hypertrophy moderate aortic stenosis is present.  Could consider SGLT2 inhibitors though experience with these medications in dialysis patient is relatively new.  - Anemia of CKD, hemoglobin below goal.  On long-acting CASSANDRA as outpatient.  Continue the same.  - Left renal lesion, with recent spontaneous right perinephric subcapsular hematoma, needs urology evaluation outpatient.  - History of severe hypertension, blood pressure is acceptable.  - Type 2 diabetes with complications.  - Bilateral renal artery stenosis.  - History of lung cancer and COPD.  D/w primary and dialysis staff.    Edi García MD  04/17/24  13:57 EDT

## 2024-04-17 NOTE — PLAN OF CARE
Goal Outcome Evaluation:  Plan of Care Reviewed With: patient      3L NC. Medicated for pain.

## 2024-04-17 NOTE — NURSING NOTE
Duration of Treatment 4.0 Hours                                        Completed entire treatment   Access Site Tunneled Dialysis Catheter             Hep locked post treatment   Dialyzer Revaclear    mL/min   Dialysate Temperature (C) 36   BFR-As tolerated to a maximum of: 400 mL/min   Dialysate Solution Bath: K+ = 3 mEq, CA = 2.5mg   Bicarb 30 meq   Na+ 137 meq   Fluid Removal: 4L                                               Per MD verbal order 2L removed       Patient treatment initiated without difficulty, MD at bedside and verbally ordered 2L fluid removal in place of the 4L. Patient tolerated treatment and denied any discomfort. Post treatment HD catheter Hep locked. Vitals stable throughout.  Post B/P 142/52 HR 75    Report sent via secure chat to BILL [facility]

## 2024-04-18 NOTE — ED PROVIDER NOTES
Time: 10:32 PM EDT  Date of encounter:  4/17/2024  Independent Historian/Clinical History and Information was obtained by:   Patient    History is limited by: N/A    Chief Complaint: weakness      History of Present Illness:  Patient is a 89 y.o. year old female who presents to the emergency department for evaluation of Weakness and shortness of breath.  Family states she was just discharged from the hospital today.  She had dialysis 3 days in a row.  She states she feels as if they took off to much fluid usually she feels weak like this when she has had multiple dialysis.  She reports some shortness of breath.  At baseline she is on 2 L.  She reports a cough with some green sputum.  She is not currently on antibiotics.  No nausea or vomiting.  Family at bedside.    HPI    Patient Care Team  Primary Care Provider: Rafael Lyons MD    Past Medical History:     No Known Allergies  Past Medical History:   Diagnosis Date    Allergic rhinitis     Anemia     Arthritis     Asthma     USE INHALERS AND NEBULIZERS    Back pain     Bladder disorder     Cancer     LEFT LUNG CANCER 2005 SURGERY AND CHEMO DONE  AND CURRENTLY 4/ 14/22  RIGHT LUNG CANCER HAS ONLY RECEIVED RADIATION THUS FAR    Chronic kidney disease     stage 3    CKD (chronic kidney disease) stage 4, GFR 15-29 ml/min     Condition not found     ulcer    Congestive heart failure (CHF)     FOLLOWED BY DR AQUINO. DENIES CP BUT DOES HAVE SOA CHRONIC ISSUE COPD/LUNG CANCER    COPD (chronic obstructive pulmonary disease)     FOLLOWED BY DR MARIAJOSE BAILEY    Coronary artery disease     DENIES CP BUT DOES GET SOA MOST OF THE TIME WITH EXERTION BUT OCC AT REST CHRONIC ISSUE COPD/LUNG CANCER    Deep vein thrombosis     Diabetes mellitus     DOES NOT CHECK BS DAILY    Disease of thyroid gland     HYPOTHYROIDISM    Essential hypertension     Gastric ulcer     GERD (gastroesophageal reflux disease)     Heart murmur     History of transfusion     NO ISSUES POST TRANSFUSION WAS MANY  YEARS AGO    Hyperlipemia     Leukocytopenia     FOLLOWED BY DR MIR BAILEY    Limb swelling     Lumbago     Lumbar spinal stenosis     Lung cancer     Migraine headache     Multiple joint pain     On home oxygen therapy     3L/NC PRN    Osteopenia     Reflux esophagitis     Shortness of breath     Thyroid nodule     HAS ULTRASOUND YEARLY BEING MONITORED    Vascular disease     Vitamin D deficiency      Past Surgical History:   Procedure Laterality Date    ABDOMINAL SURGERY      APPENDECTOMY      ARTERIOVENOUS FISTULA/SHUNT SURGERY Left 05/10/2022    Procedure: Left basilic vein transposition;  Surgeon: Wan Barksdale MD;  Location: [facility] MAIN OR;  Service: Vascular;  Laterality: Left;    ARTERIOVENOUS FISTULA/SHUNT SURGERY Left 3/23/2023    Procedure: Ligation of left arm arteriovenous fistula;  Surgeon: Wan Barksdale MD;  Location: [facility] MAIN OR;  Service: Vascular;  Laterality: Left;    ARTERIOVENOUS FISTULA/SHUNT SURGERY Left 11/30/2023    Procedure: Creation of left arm arteriovenous graft;  Surgeon: Wan Barksdale MD;  Location: [facility] MAIN OR;  Service: Vascular;  Laterality: Left;    CARDIAC CATHETERIZATION  1996    CARDIAC SURGERY      CARDIAC SURGERY      fluid drained from heart    CATARACT EXTRACTION, BILATERAL  2003    COLONOSCOPY  2014    ENDOSCOPY  2016    2019    FEMORAL ARTERY STENT Bilateral     HYSTERECTOMY      LUNG BIOPSY Left 2005    lobectomy upper lung caner    LUNG VOLUME REDUCTION      OTHER SURGICAL HISTORY      artifical joints/limbs    REPLACEMENT TOTAL KNEE Left 2016    UPPER GASTROINTESTINAL ENDOSCOPY       Family History   Problem Relation Age of Onset    Arthritis Mother     Arthritis Father     Cancer Brother     Diabetes Brother     Other Brother         blood disease     Prostate cancer Brother     Cancer Brother     Prostate cancer Brother     Cancer Brother     Cancer Brother     Malig Hyperthermia Neg Hx     Colon cancer Neg Hx        Home Medications:  Prior to Admission  medications    Medication Sig Start Date End Date Taking? Authorizing Provider   albuterol sulfate  (90 Base) MCG/ACT inhaler Inhale 2 puffs Every 4 (Four) Hours As Needed for Wheezing.    Sarah Parker MD   budesonide-formoterol (SYMBICORT) 160-4.5 MCG/ACT inhaler Inhale 2 puffs 2 (Two) Times a Day.    Sarah Parker MD   carvedilol (Coreg) 25 MG tablet Take 1 tablet by mouth 2 (Two) Times a Day With Meals for 30 days. 11/5/23 4/1/24  Patrick Turcios MD   dexlansoprazole (DEXILANT) 60 MG capsule Take 1 capsule by mouth Daily. 9/23/22   Sarah Parker MD   ergocalciferol (ERGOCALCIFEROL) 1.25 MG (17644 UT) capsule Take 1 capsule by mouth Every 14 (Fourteen) Days. 3/4/22   Emergency, Nurse Emy, RN   hydrALAZINE (APRESOLINE) 25 MG tablet Take 1 tablet by mouth Every 12 (Twelve) Hours. 3/9/24   Emily Chandra MD   levocetirizine (XYZAL) 5 MG tablet TAKE 1 TABLET DAILY  Patient taking differently: Take 1 tablet by mouth Every Evening. 6/23/23   Paloma James MD   linagliptin (Tradjenta) 5 MG tablet tablet Take 1 tablet by mouth Daily. 1/10/22   Paloma James MD   nitroglycerin (NITROSTAT) 0.4 MG SL tablet Place 1 tablet under the tongue Every 5 (Five) Minutes As Needed for Chest Pain.    Sarah Parker MD   polycarbophil (calcium polycarbophil) 625 MG tablet tablet Take 1 tablet by mouth Daily As Needed.    Sarah Parker MD   rosuvastatin (CRESTOR) 20 MG tablet Take 1 tablet by mouth Daily. 7/7/23   León Sanchez MD   sevelamer (RENVELA) 800 MG tablet Take 1 tablet by mouth 3 (Three) Times a Day With Meals. 10/9/23   Sarah Parker MD   traMADol (ULTRAM) 50 MG tablet Take 1 tablet by mouth Every 8 (Eight) Hours As Needed for Moderate Pain for up to 30 days. 4/3/24 5/3/24  Rafael Lyons MD   Trulicity 1.5 MG/0.5ML solution pen-injector Inject 1.5 mg under the skin into the appropriate area as directed Every 14 (Fourteen) Days.    Provider,  "Sarah, MD        Social History:   Social History     Tobacco Use    Smoking status: Former     Current packs/day: 0.00     Average packs/day: 1 pack/day for 52.5 years (52.5 ttl pk-yrs)     Types: Cigarettes     Start date:      Quit date: 2004     Years since quittin.8     Passive exposure: Past    Smokeless tobacco: Never   Vaping Use    Vaping status: Never Used   Substance Use Topics    Alcohol use: Never    Drug use: Never         Review of Systems:  Review of Systems   Constitutional:  Negative for chills and fever.   HENT:  Negative for congestion, ear pain and sore throat.    Eyes:  Negative for pain.   Respiratory:  Positive for cough and shortness of breath. Negative for chest tightness.    Cardiovascular:  Negative for chest pain.   Gastrointestinal:  Negative for abdominal pain, diarrhea, nausea and vomiting.   Genitourinary:  Negative for flank pain and hematuria.   Musculoskeletal:  Negative for joint swelling.   Skin:  Negative for pallor.   Neurological:  Positive for weakness. Negative for seizures and headaches.   All other systems reviewed and are negative.       Physical Exam:  /40 (Patient Position: Lying)   Pulse 73   Temp 97.4 °F (36.3 °C) (Oral)   Resp 14   Ht 165.1 cm (65\")   Wt 59 kg (130 lb 1.1 oz)   LMP  (LMP Unknown)   SpO2 96%   BMI 21.64 kg/m²     Physical Exam  Constitutional:       Appearance: Normal appearance.   HENT:      Head: Normocephalic and atraumatic.      Nose: Nose normal.      Mouth/Throat:      Mouth: Mucous membranes are moist.   Eyes:      Extraocular Movements: Extraocular movements intact.      Conjunctiva/sclera: Conjunctivae normal.      Pupils: Pupils are equal, round, and reactive to light.   Cardiovascular:      Rate and Rhythm: Normal rate and regular rhythm.      Pulses: Normal pulses.      Heart sounds: Normal heart sounds.   Pulmonary:      Effort: Pulmonary effort is normal.      Breath sounds: Rhonchi present.   Chest:     "  Comments: Dialysis catheter right upper chest  Abdominal:      General: There is no distension.      Palpations: Abdomen is soft.      Tenderness: There is no abdominal tenderness.   Musculoskeletal:         General: Normal range of motion.      Cervical back: Normal range of motion.   Skin:     General: Skin is warm and dry.      Capillary Refill: Capillary refill takes less than 2 seconds.   Neurological:      General: No focal deficit present.      Mental Status: She is alert and oriented to person, place, and time. Mental status is at baseline.   Psychiatric:         Mood and Affect: Mood normal.         Behavior: Behavior normal.                  Procedures:  Procedures      Medical Decision Making:      Comorbidities that affect care:    Cancer, Chronic Kidney Disease, Congestive Heart Failure, COPD, Diabetes, Hypertension, Thyroid Disease    External Notes reviewed:    Previous Admission Note: Patient was recently admitted on 4/14/2024 for bronchitis, volume overload and congestive heart failure.      The following orders were placed and all results were independently analyzed by me:  Orders Placed This Encounter   Procedures    XR Chest 1 View    [place] Draw    Comprehensive Metabolic Panel    BNP    Single High Sensitivity Troponin T    CBC Auto Differential    High Sensitivity Troponin T 2Hr    NPO Diet NPO Type: Strict NPO    Undress & Gown    Continuous Pulse Oximetry    Vital Signs    Oxygen Therapy- Nasal Cannula; Titrate 1-6 LPM Per SpO2; 90 - 95%    ECG 12 Lead ED Triage Standing Order; SOA    Insert Peripheral IV    CBC & Differential    Green Top (Gel)    Lavender Top    Gold Top - SST    Light Blue Top       Medications Given in the Emergency Department:  Medications   sodium chloride 0.9 % flush 10 mL (has no administration in time range)   sodium chloride 0.9 % bolus 1,000 mL (0 mL Intravenous Stopped 4/17/24 2212)        ED Course:    ED Course as of 04/17/24 2241   Wed Apr 17, 2024 2121  ECG 12 Lead ED Triage Standing Order; SOA  Sinus rhythm rate of 73.  No acute ST elevation.  Normal OR QTc.  LVH.  Normal OR and QTc.  EKG interpreted by me. [LD]      ED Course User Index  [LD] Ghassan Torres MD       Labs:    Lab Results (last 24 hours)       Procedure Component Value Units Date/Time    CBC & Differential [[id number]]  (Abnormal) Collected: 04/17/24 1940    Specimen: Blood Updated: 04/17/24 1952    Narrative:      The following orders were created for panel order CBC & Differential.  Procedure                               Abnormality         Status                     ---------                               -----------         ------                     CBC Auto Differential[[id number]]        Abnormal            Final result               Scan Slide[[id number]]                                                                    Please view results for these tests on the individual orders.    Comprehensive Metabolic Panel [[id number]]  (Abnormal) Collected: 04/17/24 1940    Specimen: Blood Updated: 04/17/24 2009     Glucose 135 mg/dL      BUN 16 mg/dL      Creatinine 1.86 mg/dL      Sodium 136 mmol/L      Potassium 4.5 mmol/L      Comment: Slight hemolysis detected by analyzer. Result may be falsely elevated.        Chloride 98 mmol/L      CO2 25.6 mmol/L      Calcium 8.7 mg/dL      Total Protein 6.5 g/dL      Albumin 3.9 g/dL      ALT (SGPT) 15 U/L      AST (SGOT) 22 U/L      Alkaline Phosphatase 159 U/L      Total Bilirubin 0.4 mg/dL      Globulin 2.6 gm/dL      A/G Ratio 1.5 g/dL      BUN/Creatinine Ratio 8.6     Anion Gap 12.4 mmol/L      eGFR 25.9 mL/min/1.73     Narrative:      GFR Normal >60  Chronic Kidney Disease <60  Kidney Failure <15    The GFR formula is only valid for adults with stable renal function between ages 18 and 70.    BNP [[id number]]  (Abnormal) Collected: 04/17/24 1940    Specimen: Blood Updated: 04/17/24 2007     proBNP 13,701.0 pg/mL     Narrative:      This assay is  used as an aid in the diagnosis of individuals suspected of having heart failure. It can be used as an aid in the diagnosis of acute decompensated heart failure (ADHF) in patients presenting with signs and symptoms of ADHF to the emergency department (ED). In addition, NT-proBNP of <300 pg/mL indicates ADHF is not likely.    Age Range Result Interpretation  NT-proBNP Concentration (pg/mL:      <50             Positive            >450                   Gray                 300-450                    Negative             <300    50-75           Positive            >900                  Gray                300-900                  Negative            <300      >75             Positive            >1800                  Gray                300-1800                  Negative            <300    Single High Sensitivity Troponin T [[id number]]  (Abnormal) Collected: 04/17/24 1940    Specimen: Blood Updated: 04/17/24 2028     HS Troponin T 68 ng/L     Narrative:      High Sensitive Troponin T Reference Range:  <14.0 ng/L- Negative Female for AMI  <22.0 ng/L- Negative Male for AMI  >=14 - Abnormal Female indicating possible myocardial injury.  >=22 - Abnormal Male indicating possible myocardial injury.   Clinicians would have to utilize clinical acumen, EKG, Troponin, and serial changes to determine if it is an Acute Myocardial Infarction or myocardial injury due to an underlying chronic condition.         CBC Auto Differential [[id number]]  (Abnormal) Collected: 04/17/24 1940    Specimen: Blood Updated: 04/17/24 1951     WBC 9.84 10*3/mm3      RBC 3.27 10*6/mm3      Hemoglobin 9.9 g/dL      Hematocrit 33.0 %      .9 fL      MCH 30.3 pg      MCHC 30.0 g/dL      RDW 14.6 %      RDW-SD 54.0 fl      MPV 10.5 fL      Platelets 112 10*3/mm3      Neutrophil % 83.3 %      Lymphocyte % 7.2 %      Monocyte % 8.2 %      Eosinophil % 0.6 %      Basophil % 0.3 %      Immature Grans % 0.4 %      Neutrophils, Absolute 8.19 10*3/mm3       Lymphocytes, Absolute 0.71 10*3/mm3      Monocytes, Absolute 0.81 10*3/mm3      Eosinophils, Absolute 0.06 10*3/mm3      Basophils, Absolute 0.03 10*3/mm3      Immature Grans, Absolute 0.04 10*3/mm3      nRBC 0.0 /100 WBC     High Sensitivity Troponin T 2Hr [[id number]] Collected: 04/17/24 2213    Specimen: Blood Updated: 04/17/24 2215             Imaging:    XR Chest 1 View    Result Date: 4/17/2024  XR CHEST 1 VW-  Date of Exam: 4/17/2024 7:25 PM  Indication: SOA Triage Protocol lung cancer  Comparison: Chest x-ray 4/14/2024  Findings: Surgical changes in the left lung with sutures and volume loss and pleural thickening. Shift of mediastinum to the left. Unchanged linear opacity right upper lung. Central venous catheter tip terminates at the superior vena cava. Severe atherosclerotic calcification of the thoracic aorta.    Impression: No change from previous study. Chronic changes of the chest with post operative changes in the left thorax.   Electronically Signed By-Kimberli Alvarado MD On:4/17/2024 7:41 PM         Differential Diagnosis and Discussion:    Weakness: Based on the patient's history, signs, and symptoms, the diffential diagnosis includes but is not limited to meningitis, stroke, sepsis, subarachnoid hemorrhage, intracranial bleeding, encephalitis, acute uti, dehydration, MS, myasthenia gravis, Guillan [place], migraine variant, neuromuscular disorders vertigo, electrolyte imbalance, and metabolic disorders.    All labs were reviewed and interpreted by me.  All X-rays impressions were independently interpreted by me.  EKG was interpreted by me.    MDM  Number of Diagnoses or Management Options  Diagnosis management comments: Patient is afebrile and nontoxic-appearing.  Vital signs are stable.  Patient states he believes it took off too much fluid.  Patient was given 500 mL bolus of fluid.  Thousand was ordered but she only received 500 of it.  Chest x-ray showed no acute findings.  Labs showed elevated  creatinine of 1.86 similar to previous.  And patient is on dialysis.  BNP improved from recent.  Troponin is similar to previous.  Discussed options of admission versus going home.  Patient preferred to go home she states she feels better.  Daughter is at bedside and will be with her.  She agreed to return for any concerning symptoms.  Discussed return precautions, discharge instructions and answered all her questions.           Amount and/or Complexity of Data Reviewed  Clinical lab tests: reviewed  Tests in the radiology section of CPT®: reviewed  Review and summarize past medical records: yes  Independent visualization of images, tracings, or specimens: yes    Risk of Complications, Morbidity, and/or Mortality  Presenting problems: moderate  Management options: moderate                 Patient Care Considerations:    SEPSIS was considered but is NOT present in the emergency department as SIRS criteria is not present.      Consultants/Shared Management Plan:    None    Social Determinants of Health:    Patient has presented with family members who are responsible, reliable and will ensure follow up care.      Disposition and Care Coordination:    Discharged: I considered escalation of care by admitting this patient to the hospital, however patient would prefer to go home and report improvement in symtoms    I have explained the patient´s condition, diagnoses and treatment plan based on the information available to me at this time. I have answered questions and addressed any concerns. The patient has a good  understanding of the patient´s diagnosis, condition, and treatment plan as can be expected at this point. The vital signs have been stable. The patient´s condition is stable and appropriate for discharge from the emergency department.      The patient will pursue further outpatient evaluation with the primary care physician or other designated or consulting physician as outlined in the discharge instructions.  They are agreeable to this plan of care and follow-up instructions have been explained in detail. The patient has received these instructions in written format and has expressed an understanding of the discharge instructions. The patient is aware that any significant change in condition or worsening of symptoms should prompt an immediate return to this or the closest emergency department or call to 1.  I have explained discharge medications and the need for follow up with the patient/caretakers. This was also printed in the discharge instructions. Patient was discharged with the following medications and follow up:      Medication List        New Prescriptions      doxycycline 100 MG capsule  Commonly known as: MONODOX  Take 1 capsule by mouth 2 (Two) Times a Day for 7 days.            Changed      levocetirizine 5 MG tablet  Commonly known as: XYZAL  TAKE 1 TABLET DAILY  What changed: when to take this               Where to Get Your Medications        These medications were sent to [facility] - [address] [phone] [place] [id number] [place] [id number]      Phone: [phone]   doxycycline 100 MG capsule      Rafael Lyons MD  [address] [address] [phone]    In 2 days         Final diagnoses:   Weakness   Bronchitis        ED Disposition       ED Disposition   Discharge    Condition   Stable    Comment   --               This medical record created using voice recognition software.             Ghassan Torres MD  04/17/24 0191

## 2024-04-18 NOTE — OUTREACH NOTE
Prep Survey      Flowsheet Row Responses   [facility] discharged from? [facility]   Is LACE score < 7 ? No   Eligibility Readm Mgmt   Discharge diagnosis Bronchitis (J40   Does the patient have one of the following disease processes/diagnoses(primary or secondary)? Other   Does the patient have Home health ordered? No   Is there a DME ordered? No   Comments regarding appointments Addison Ortega on M,W, F   Prep survey completed? Yes            LITO HART - Registered Nurse

## 2024-04-20 LAB
BACTERIA SPEC AEROBE CULT: NORMAL
BACTERIA SPEC AEROBE CULT: NORMAL
QT INTERVAL: 420 MS
QT INTERVAL: 441 MS
QTC INTERVAL: 486 MS
QTC INTERVAL: 501 MS

## 2024-04-21 ENCOUNTER — HOSPITAL ENCOUNTER (INPATIENT)
Facility: HOSPITAL | Age: 89
LOS: 7 days | Discharge: HOME HEALTH | DRG: 202 | End: 2024-04-29
Attending: EMERGENCY MEDICINE | Admitting: INTERNAL MEDICINE
Payer: MEDICARE

## 2024-04-21 ENCOUNTER — READMISSION MANAGEMENT (OUTPATIENT)
Dept: CALL CENTER | Facility: HOSPITAL | Age: 89
End: 2024-04-21
Payer: MEDICARE

## 2024-04-21 ENCOUNTER — APPOINTMENT (OUTPATIENT)
Dept: GENERAL RADIOLOGY | Facility: HOSPITAL | Age: 89
DRG: 202 | End: 2024-04-21
Payer: MEDICARE

## 2024-04-21 DIAGNOSIS — J15.1 PNEUMONIA DUE TO PSEUDOMONAS SPECIES, UNSPECIFIED LATERALITY, UNSPECIFIED PART OF LUNG: ICD-10-CM

## 2024-04-21 DIAGNOSIS — N18.6 ESRD (END STAGE RENAL DISEASE): ICD-10-CM

## 2024-04-21 DIAGNOSIS — J44.1 ACUTE EXACERBATION OF CHRONIC OBSTRUCTIVE PULMONARY DISEASE (COPD): ICD-10-CM

## 2024-04-21 DIAGNOSIS — J44.1 CHRONIC OBSTRUCTIVE PULMONARY DISEASE WITH ACUTE EXACERBATION: Primary | ICD-10-CM

## 2024-04-21 DIAGNOSIS — J44.1 COPD WITH ACUTE EXACERBATION: ICD-10-CM

## 2024-04-21 DIAGNOSIS — J44.1 COPD EXACERBATION: ICD-10-CM

## 2024-04-21 DIAGNOSIS — R26.2 DIFFICULTY WALKING: ICD-10-CM

## 2024-04-21 LAB
ALBUMIN SERPL-MCNC: 3.3 G/DL (ref 3.5–5.2)
ALBUMIN/GLOB SERPL: 1.3 G/DL
ALP SERPL-CCNC: 164 U/L (ref 39–117)
ALT SERPL W P-5'-P-CCNC: 10 U/L (ref 1–33)
ANION GAP SERPL CALCULATED.3IONS-SCNC: 12.7 MMOL/L (ref 5–15)
AST SERPL-CCNC: 12 U/L (ref 1–32)
B PARAPERT DNA SPEC QL NAA+PROBE: NOT DETECTED
B PERT DNA SPEC QL NAA+PROBE: NOT DETECTED
BASOPHILS # BLD AUTO: 0.02 10*3/MM3 (ref 0–0.2)
BASOPHILS NFR BLD AUTO: 0.3 % (ref 0–1.5)
BILIRUB SERPL-MCNC: 0.3 MG/DL (ref 0–1.2)
BUN SERPL-MCNC: 48 MG/DL (ref 8–23)
BUN/CREAT SERPL: 11.4 (ref 7–25)
C PNEUM DNA NPH QL NAA+NON-PROBE: NOT DETECTED
CALCIUM SPEC-SCNC: 7.9 MG/DL (ref 8.6–10.5)
CHLORIDE SERPL-SCNC: 100 MMOL/L (ref 98–107)
CO2 SERPL-SCNC: 25.3 MMOL/L (ref 22–29)
CREAT SERPL-MCNC: 4.21 MG/DL (ref 0.57–1)
D-LACTATE SERPL-SCNC: 1.3 MMOL/L (ref 0.5–2)
DEPRECATED RDW RBC AUTO: 53.5 FL (ref 37–54)
EGFRCR SERPLBLD CKD-EPI 2021: 9.7 ML/MIN/1.73
EOSINOPHIL # BLD AUTO: 0.18 10*3/MM3 (ref 0–0.4)
EOSINOPHIL NFR BLD AUTO: 2.3 % (ref 0.3–6.2)
ERYTHROCYTE [DISTWIDTH] IN BLOOD BY AUTOMATED COUNT: 14.7 % (ref 12.3–15.4)
FLUAV SUBTYP SPEC NAA+PROBE: NOT DETECTED
FLUBV RNA ISLT QL NAA+PROBE: NOT DETECTED
GEN 5 2HR TROPONIN T REFLEX: 66 NG/L
GLOBULIN UR ELPH-MCNC: 2.5 GM/DL
GLUCOSE BLDC GLUCOMTR-MCNC: 173 MG/DL (ref 70–99)
GLUCOSE SERPL-MCNC: 148 MG/DL (ref 65–99)
HADV DNA SPEC NAA+PROBE: NOT DETECTED
HCOV 229E RNA SPEC QL NAA+PROBE: NOT DETECTED
HCOV HKU1 RNA SPEC QL NAA+PROBE: NOT DETECTED
HCOV NL63 RNA SPEC QL NAA+PROBE: NOT DETECTED
HCOV OC43 RNA SPEC QL NAA+PROBE: NOT DETECTED
HCT VFR BLD AUTO: 28.2 % (ref 34–46.6)
HGB BLD-MCNC: 8.5 G/DL (ref 12–15.9)
HMPV RNA NPH QL NAA+NON-PROBE: DETECTED
HOLD SPECIMEN: NORMAL
HPIV1 RNA ISLT QL NAA+PROBE: NOT DETECTED
HPIV2 RNA SPEC QL NAA+PROBE: NOT DETECTED
HPIV3 RNA NPH QL NAA+PROBE: NOT DETECTED
HPIV4 P GENE NPH QL NAA+PROBE: NOT DETECTED
IMM GRANULOCYTES # BLD AUTO: 0.04 10*3/MM3 (ref 0–0.05)
IMM GRANULOCYTES NFR BLD AUTO: 0.5 % (ref 0–0.5)
LYMPHOCYTES # BLD AUTO: 0.59 10*3/MM3 (ref 0.7–3.1)
LYMPHOCYTES NFR BLD AUTO: 7.7 % (ref 19.6–45.3)
M PNEUMO IGG SER IA-ACNC: NOT DETECTED
MCH RBC QN AUTO: 30.4 PG (ref 26.6–33)
MCHC RBC AUTO-ENTMCNC: 30.1 G/DL (ref 31.5–35.7)
MCV RBC AUTO: 100.7 FL (ref 79–97)
MONOCYTES # BLD AUTO: 0.68 10*3/MM3 (ref 0.1–0.9)
MONOCYTES NFR BLD AUTO: 8.9 % (ref 5–12)
NEUTROPHILS NFR BLD AUTO: 6.15 10*3/MM3 (ref 1.7–7)
NEUTROPHILS NFR BLD AUTO: 80.3 % (ref 42.7–76)
NRBC BLD AUTO-RTO: 0 /100 WBC (ref 0–0.2)
NT-PROBNP SERPL-MCNC: 7318 PG/ML (ref 0–1800)
PLATELET # BLD AUTO: 150 10*3/MM3 (ref 140–450)
PMV BLD AUTO: 10.3 FL (ref 6–12)
POTASSIUM SERPL-SCNC: 4.2 MMOL/L (ref 3.5–5.2)
PROT SERPL-MCNC: 5.8 G/DL (ref 6–8.5)
RBC # BLD AUTO: 2.8 10*6/MM3 (ref 3.77–5.28)
RHINOVIRUS RNA SPEC NAA+PROBE: NOT DETECTED
RSV RNA NPH QL NAA+NON-PROBE: NOT DETECTED
SARS-COV-2 RNA RESP QL NAA+PROBE: NOT DETECTED
SODIUM SERPL-SCNC: 138 MMOL/L (ref 136–145)
TROPONIN T DELTA: -2 NG/L
TROPONIN T SERPL HS-MCNC: 68 NG/L
WBC NRBC COR # BLD AUTO: 7.66 10*3/MM3 (ref 3.4–10.8)
WHOLE BLOOD HOLD COAG: NORMAL
WHOLE BLOOD HOLD SPECIMEN: NORMAL

## 2024-04-21 PROCEDURE — 36415 COLL VENOUS BLD VENIPUNCTURE: CPT | Performed by: EMERGENCY MEDICINE

## 2024-04-21 PROCEDURE — 84484 ASSAY OF TROPONIN QUANT: CPT | Performed by: EMERGENCY MEDICINE

## 2024-04-21 PROCEDURE — 25010000002 AZITHROMYCIN PER 500 MG: Performed by: EMERGENCY MEDICINE

## 2024-04-21 PROCEDURE — 83605 ASSAY OF LACTIC ACID: CPT | Performed by: EMERGENCY MEDICINE

## 2024-04-21 PROCEDURE — 83880 ASSAY OF NATRIURETIC PEPTIDE: CPT | Performed by: EMERGENCY MEDICINE

## 2024-04-21 PROCEDURE — 25010000002 METHYLPREDNISOLONE PER 125 MG: Performed by: EMERGENCY MEDICINE

## 2024-04-21 PROCEDURE — 94799 UNLISTED PULMONARY SVC/PX: CPT

## 2024-04-21 PROCEDURE — 25010000002 METHYLPREDNISOLONE PER 125 MG: Performed by: INTERNAL MEDICINE

## 2024-04-21 PROCEDURE — G0378 HOSPITAL OBSERVATION PER HR: HCPCS

## 2024-04-21 PROCEDURE — 87040 BLOOD CULTURE FOR BACTERIA: CPT | Performed by: EMERGENCY MEDICINE

## 2024-04-21 PROCEDURE — 71045 X-RAY EXAM CHEST 1 VIEW: CPT

## 2024-04-21 PROCEDURE — 0202U NFCT DS 22 TRGT SARS-COV-2: CPT | Performed by: INTERNAL MEDICINE

## 2024-04-21 PROCEDURE — 25810000003 SODIUM CHLORIDE 0.9 % SOLUTION: Performed by: EMERGENCY MEDICINE

## 2024-04-21 PROCEDURE — 94640 AIRWAY INHALATION TREATMENT: CPT

## 2024-04-21 PROCEDURE — 63710000001 INSULIN LISPRO (HUMAN) PER 5 UNITS: Performed by: INTERNAL MEDICINE

## 2024-04-21 PROCEDURE — 80053 COMPREHEN METABOLIC PANEL: CPT | Performed by: EMERGENCY MEDICINE

## 2024-04-21 PROCEDURE — 25010000002 CEFTRIAXONE PER 250 MG: Performed by: EMERGENCY MEDICINE

## 2024-04-21 PROCEDURE — 93005 ELECTROCARDIOGRAM TRACING: CPT | Performed by: EMERGENCY MEDICINE

## 2024-04-21 PROCEDURE — 99285 EMERGENCY DEPT VISIT HI MDM: CPT

## 2024-04-21 PROCEDURE — 82948 REAGENT STRIP/BLOOD GLUCOSE: CPT

## 2024-04-21 PROCEDURE — 85025 COMPLETE CBC W/AUTO DIFF WBC: CPT | Performed by: EMERGENCY MEDICINE

## 2024-04-21 RX ORDER — DEXTROSE MONOHYDRATE 25 G/50ML
25 INJECTION, SOLUTION INTRAVENOUS
Status: DISCONTINUED | OUTPATIENT
Start: 2024-04-21 | End: 2024-04-29 | Stop reason: HOSPADM

## 2024-04-21 RX ORDER — SODIUM CHLORIDE 0.9 % (FLUSH) 0.9 %
10 SYRINGE (ML) INJECTION AS NEEDED
Status: DISCONTINUED | OUTPATIENT
Start: 2024-04-21 | End: 2024-04-29 | Stop reason: HOSPADM

## 2024-04-21 RX ORDER — HYDRALAZINE HYDROCHLORIDE 50 MG/1
25 TABLET, FILM COATED ORAL EVERY 12 HOURS SCHEDULED
Status: DISCONTINUED | OUTPATIENT
Start: 2024-04-21 | End: 2024-04-29 | Stop reason: HOSPADM

## 2024-04-21 RX ORDER — NALOXONE HCL 0.4 MG/ML
0.4 VIAL (ML) INJECTION
Status: DISCONTINUED | OUTPATIENT
Start: 2024-04-21 | End: 2024-04-29 | Stop reason: HOSPADM

## 2024-04-21 RX ORDER — NITROGLYCERIN 0.4 MG/1
0.4 TABLET SUBLINGUAL
Status: DISCONTINUED | OUTPATIENT
Start: 2024-04-21 | End: 2024-04-29 | Stop reason: HOSPADM

## 2024-04-21 RX ORDER — ARFORMOTEROL TARTRATE 15 UG/2ML
15 SOLUTION RESPIRATORY (INHALATION)
Status: DISCONTINUED | OUTPATIENT
Start: 2024-04-21 | End: 2024-04-29 | Stop reason: HOSPADM

## 2024-04-21 RX ORDER — METHYLPREDNISOLONE SODIUM SUCCINATE 125 MG/2ML
62.5 INJECTION, POWDER, LYOPHILIZED, FOR SOLUTION INTRAMUSCULAR; INTRAVENOUS DAILY
Status: DISPENSED | OUTPATIENT
Start: 2024-04-21 | End: 2024-04-26

## 2024-04-21 RX ORDER — ALPRAZOLAM 0.25 MG/1
0.25 TABLET ORAL EVERY 8 HOURS PRN
Status: ACTIVE | OUTPATIENT
Start: 2024-04-21 | End: 2024-04-26

## 2024-04-21 RX ORDER — CARVEDILOL 25 MG/1
25 TABLET ORAL 2 TIMES DAILY WITH MEALS
Status: DISCONTINUED | OUTPATIENT
Start: 2024-04-21 | End: 2024-04-29 | Stop reason: HOSPADM

## 2024-04-21 RX ORDER — ONDANSETRON 2 MG/ML
4 INJECTION INTRAMUSCULAR; INTRAVENOUS EVERY 6 HOURS PRN
Status: DISCONTINUED | OUTPATIENT
Start: 2024-04-21 | End: 2024-04-29 | Stop reason: HOSPADM

## 2024-04-21 RX ORDER — SEVELAMER CARBONATE 800 MG/1
800 TABLET, FILM COATED ORAL
Status: DISCONTINUED | OUTPATIENT
Start: 2024-04-22 | End: 2024-04-29 | Stop reason: HOSPADM

## 2024-04-21 RX ORDER — ALUMINA, MAGNESIA, AND SIMETHICONE 2400; 2400; 240 MG/30ML; MG/30ML; MG/30ML
15 SUSPENSION ORAL EVERY 6 HOURS PRN
Status: DISCONTINUED | OUTPATIENT
Start: 2024-04-21 | End: 2024-04-29 | Stop reason: HOSPADM

## 2024-04-21 RX ORDER — SODIUM CHLORIDE 9 MG/ML
40 INJECTION, SOLUTION INTRAVENOUS AS NEEDED
Status: DISCONTINUED | OUTPATIENT
Start: 2024-04-21 | End: 2024-04-29 | Stop reason: HOSPADM

## 2024-04-21 RX ORDER — CHOLECALCIFEROL (VITAMIN D3) 125 MCG
5 CAPSULE ORAL NIGHTLY PRN
Status: DISCONTINUED | OUTPATIENT
Start: 2024-04-21 | End: 2024-04-29 | Stop reason: HOSPADM

## 2024-04-21 RX ORDER — ERGOCALCIFEROL 1.25 MG/1
50000 CAPSULE ORAL
Status: DISCONTINUED | OUTPATIENT
Start: 2024-04-29 | End: 2024-04-29 | Stop reason: HOSPADM

## 2024-04-21 RX ORDER — MULTIPLE VITAMINS W/ MINERALS TAB 9MG-400MCG
1 TAB ORAL DAILY
Status: DISCONTINUED | OUTPATIENT
Start: 2024-04-21 | End: 2024-04-29 | Stop reason: HOSPADM

## 2024-04-21 RX ORDER — ACETAMINOPHEN 325 MG/1
650 TABLET ORAL EVERY 4 HOURS PRN
Status: DISCONTINUED | OUTPATIENT
Start: 2024-04-21 | End: 2024-04-29 | Stop reason: HOSPADM

## 2024-04-21 RX ORDER — TRAMADOL HYDROCHLORIDE 50 MG/1
25 TABLET ORAL EVERY 12 HOURS PRN
Status: DISPENSED | OUTPATIENT
Start: 2024-04-21 | End: 2024-04-26

## 2024-04-21 RX ORDER — AZITHROMYCIN 250 MG/1
250 TABLET, FILM COATED ORAL
Status: DISPENSED | OUTPATIENT
Start: 2024-04-22 | End: 2024-04-26

## 2024-04-21 RX ORDER — FAMOTIDINE 20 MG/1
20 TABLET, FILM COATED ORAL DAILY
Status: DISCONTINUED | OUTPATIENT
Start: 2024-04-21 | End: 2024-04-29 | Stop reason: HOSPADM

## 2024-04-21 RX ORDER — ROSUVASTATIN CALCIUM 20 MG/1
20 TABLET, COATED ORAL NIGHTLY
Status: DISCONTINUED | OUTPATIENT
Start: 2024-04-21 | End: 2024-04-29 | Stop reason: HOSPADM

## 2024-04-21 RX ORDER — NICOTINE POLACRILEX 4 MG
15 LOZENGE BUCCAL
Status: DISCONTINUED | OUTPATIENT
Start: 2024-04-21 | End: 2024-04-29 | Stop reason: HOSPADM

## 2024-04-21 RX ORDER — IPRATROPIUM BROMIDE AND ALBUTEROL SULFATE 2.5; .5 MG/3ML; MG/3ML
3 SOLUTION RESPIRATORY (INHALATION) EVERY 4 HOURS PRN
Status: DISCONTINUED | OUTPATIENT
Start: 2024-04-21 | End: 2024-04-29 | Stop reason: HOSPADM

## 2024-04-21 RX ORDER — BUDESONIDE 0.5 MG/2ML
0.5 INHALANT ORAL
Status: DISCONTINUED | OUTPATIENT
Start: 2024-04-21 | End: 2024-04-29 | Stop reason: HOSPADM

## 2024-04-21 RX ORDER — POLYETHYLENE GLYCOL 3350 17 G/17G
17 POWDER, FOR SOLUTION ORAL DAILY PRN
Status: DISCONTINUED | OUTPATIENT
Start: 2024-04-21 | End: 2024-04-29 | Stop reason: HOSPADM

## 2024-04-21 RX ORDER — SODIUM CHLORIDE 0.9 % (FLUSH) 0.9 %
10 SYRINGE (ML) INJECTION EVERY 12 HOURS SCHEDULED
Status: DISCONTINUED | OUTPATIENT
Start: 2024-04-21 | End: 2024-04-29 | Stop reason: HOSPADM

## 2024-04-21 RX ORDER — HYDRALAZINE HYDROCHLORIDE 20 MG/ML
10 INJECTION INTRAMUSCULAR; INTRAVENOUS EVERY 4 HOURS PRN
Status: DISCONTINUED | OUTPATIENT
Start: 2024-04-21 | End: 2024-04-29 | Stop reason: HOSPADM

## 2024-04-21 RX ORDER — IPRATROPIUM BROMIDE AND ALBUTEROL SULFATE 2.5; .5 MG/3ML; MG/3ML
3 SOLUTION RESPIRATORY (INHALATION) ONCE
Status: COMPLETED | OUTPATIENT
Start: 2024-04-21 | End: 2024-04-21

## 2024-04-21 RX ORDER — INSULIN LISPRO 100 [IU]/ML
2-9 INJECTION, SOLUTION INTRAVENOUS; SUBCUTANEOUS
Status: DISCONTINUED | OUTPATIENT
Start: 2024-04-21 | End: 2024-04-29 | Stop reason: HOSPADM

## 2024-04-21 RX ORDER — IBUPROFEN 600 MG/1
1 TABLET ORAL
Status: DISCONTINUED | OUTPATIENT
Start: 2024-04-21 | End: 2024-04-29 | Stop reason: HOSPADM

## 2024-04-21 RX ORDER — BISACODYL 5 MG/1
5 TABLET, DELAYED RELEASE ORAL DAILY PRN
Status: DISCONTINUED | OUTPATIENT
Start: 2024-04-21 | End: 2024-04-29 | Stop reason: HOSPADM

## 2024-04-21 RX ORDER — ERGOCALCIFEROL 1.25 MG/1
50000 CAPSULE ORAL
Status: DISCONTINUED | OUTPATIENT
Start: 2024-04-21 | End: 2024-04-21

## 2024-04-21 RX ORDER — BISACODYL 10 MG
10 SUPPOSITORY, RECTAL RECTAL DAILY PRN
Status: DISCONTINUED | OUTPATIENT
Start: 2024-04-21 | End: 2024-04-29 | Stop reason: HOSPADM

## 2024-04-21 RX ORDER — METHYLPREDNISOLONE SODIUM SUCCINATE 125 MG/2ML
125 INJECTION, POWDER, LYOPHILIZED, FOR SOLUTION INTRAMUSCULAR; INTRAVENOUS ONCE
Status: COMPLETED | OUTPATIENT
Start: 2024-04-21 | End: 2024-04-21

## 2024-04-21 RX ORDER — CALCIUM CARBONATE 500 MG/1
2 TABLET, CHEWABLE ORAL 2 TIMES DAILY PRN
Status: DISCONTINUED | OUTPATIENT
Start: 2024-04-21 | End: 2024-04-29 | Stop reason: HOSPADM

## 2024-04-21 RX ORDER — AMOXICILLIN 250 MG
2 CAPSULE ORAL 2 TIMES DAILY PRN
Status: DISCONTINUED | OUTPATIENT
Start: 2024-04-21 | End: 2024-04-29 | Stop reason: HOSPADM

## 2024-04-21 RX ORDER — ACETAMINOPHEN 650 MG/1
650 SUPPOSITORY RECTAL EVERY 4 HOURS PRN
Status: DISCONTINUED | OUTPATIENT
Start: 2024-04-21 | End: 2024-04-29 | Stop reason: HOSPADM

## 2024-04-21 RX ORDER — ACETAMINOPHEN 160 MG/5ML
650 SOLUTION ORAL EVERY 4 HOURS PRN
Status: DISCONTINUED | OUTPATIENT
Start: 2024-04-21 | End: 2024-04-29 | Stop reason: HOSPADM

## 2024-04-21 RX ADMIN — ROSUVASTATIN CALCIUM 20 MG: 20 TABLET, FILM COATED ORAL at 21:16

## 2024-04-21 RX ADMIN — BUDESONIDE 0.5 MG: 0.5 SUSPENSION RESPIRATORY (INHALATION) at 20:53

## 2024-04-21 RX ADMIN — AZITHROMYCIN MONOHYDRATE 500 MG: 500 INJECTION, POWDER, LYOPHILIZED, FOR SOLUTION INTRAVENOUS at 18:06

## 2024-04-21 RX ADMIN — INSULIN LISPRO 2 UNITS: 100 INJECTION, SOLUTION INTRAVENOUS; SUBCUTANEOUS at 21:28

## 2024-04-21 RX ADMIN — SODIUM CHLORIDE 500 ML: 9 INJECTION, SOLUTION INTRAVENOUS at 17:25

## 2024-04-21 RX ADMIN — METHYLPREDNISOLONE SODIUM SUCCINATE 62.5 MG: 125 INJECTION INTRAMUSCULAR; INTRAVENOUS at 21:14

## 2024-04-21 RX ADMIN — METHYLPREDNISOLONE SODIUM SUCCINATE 125 MG: 125 INJECTION INTRAMUSCULAR; INTRAVENOUS at 17:25

## 2024-04-21 RX ADMIN — IPRATROPIUM BROMIDE AND ALBUTEROL SULFATE 3 ML: .5; 3 SOLUTION RESPIRATORY (INHALATION) at 15:58

## 2024-04-21 RX ADMIN — CEFTRIAXONE SODIUM 1000 MG: 1 INJECTION, POWDER, FOR SOLUTION INTRAMUSCULAR; INTRAVENOUS at 17:25

## 2024-04-21 RX ADMIN — Medication 1 TABLET: at 21:14

## 2024-04-21 RX ADMIN — Medication 10 ML: at 21:16

## 2024-04-21 RX ADMIN — FAMOTIDINE 20 MG: 20 TABLET ORAL at 21:16

## 2024-04-21 RX ADMIN — ARFORMOTEROL TARTRATE 15 MCG: 15 SOLUTION RESPIRATORY (INHALATION) at 20:53

## 2024-04-21 NOTE — ED PROVIDER NOTES
Time: 3:06 PM EDT  Date of encounter:  4/21/2024  Independent Historian/Clinical History and Information was obtained by:   Patient    History is limited by: N/A    Chief Complaint: Shortness of breath      History of Present Illness:  Patient is a 89 y.o. year old female who presents to the emergency department for evaluation of shortness of breath.  Patient states she has been experiencing worsening shortness of breath since Wednesday.  Patient has a past medical history of chronic kidney disease and COPD and is on 3 L of oxygen at home.  She goes to dialysis Monday, Wednesday, and Fridays and states she has not missed any appointments. She admits to worsening cough and wheeze. She states her inhalers helped temporarily. She states she is also having right-sided flank pain and chest pain.    HPI    Patient Care Team  Primary Care Provider: Rafael Lyons MD    Past Medical History:     No Known Allergies  Past Medical History:   Diagnosis Date    Allergic rhinitis     Anemia     Arthritis     Asthma     USE INHALERS AND NEBULIZERS    Back pain     Bladder disorder     Cancer     LEFT LUNG CANCER 2005 SURGERY AND CHEMO DONE  AND CURRENTLY 4/ 14/22  RIGHT LUNG CANCER HAS ONLY RECEIVED RADIATION THUS FAR    Chronic kidney disease     stage 3    CKD (chronic kidney disease) stage 4, GFR 15-29 ml/min     Condition not found     ulcer    Congestive heart failure (CHF)     FOLLOWED BY DR AQUINO. DENIES CP BUT DOES HAVE SOA CHRONIC ISSUE COPD/LUNG CANCER    COPD (chronic obstructive pulmonary disease)     FOLLOWED BY DR MARIAJOSE BAILEY    Coronary artery disease     DENIES CP BUT DOES GET SOA MOST OF THE TIME WITH EXERTION BUT OCC AT REST CHRONIC ISSUE COPD/LUNG CANCER    Deep vein thrombosis     Diabetes mellitus     DOES NOT CHECK BS DAILY    Disease of thyroid gland     HYPOTHYROIDISM    Essential hypertension     Gastric ulcer     GERD (gastroesophageal reflux disease)     Heart murmur     History of transfusion     NO ISSUES  POST TRANSFUSION WAS MANY YEARS AGO    Hyperlipemia     Leukocytopenia     FOLLOWED BY DR MIR BAILEY    Limb swelling     Lumbago     Lumbar spinal stenosis     Lung cancer     Migraine headache     Multiple joint pain     On home oxygen therapy     3L/NC PRN    Osteopenia     Reflux esophagitis     Shortness of breath     Thyroid nodule     HAS ULTRASOUND YEARLY BEING MONITORED    Vascular disease     Vitamin D deficiency      Past Surgical History:   Procedure Laterality Date    ABDOMINAL SURGERY      APPENDECTOMY      ARTERIOVENOUS FISTULA/SHUNT SURGERY Left 05/10/2022    Procedure: Left basilic vein transposition;  Surgeon: Wan Barksdale MD;  Location: [facility] MAIN OR;  Service: Vascular;  Laterality: Left;    ARTERIOVENOUS FISTULA/SHUNT SURGERY Left 3/23/2023    Procedure: Ligation of left arm arteriovenous fistula;  Surgeon: Wan Barksdale MD;  Location: [facility] MAIN OR;  Service: Vascular;  Laterality: Left;    ARTERIOVENOUS FISTULA/SHUNT SURGERY Left 11/30/2023    Procedure: Creation of left arm arteriovenous graft;  Surgeon: Wan Barksdale MD;  Location: [facility] MAIN OR;  Service: Vascular;  Laterality: Left;    CARDIAC CATHETERIZATION  1996    CARDIAC SURGERY      CARDIAC SURGERY      fluid drained from heart    CATARACT EXTRACTION, BILATERAL  2003    COLONOSCOPY  2014    ENDOSCOPY  2016 2019    FEMORAL ARTERY STENT Bilateral     HYSTERECTOMY      LUNG BIOPSY Left 2005    lobectomy upper lung caner    LUNG VOLUME REDUCTION      OTHER SURGICAL HISTORY      artifical joints/limbs    REPLACEMENT TOTAL KNEE Left 2016    UPPER GASTROINTESTINAL ENDOSCOPY       Family History   Problem Relation Age of Onset    Arthritis Mother     Arthritis Father     Cancer Brother     Diabetes Brother     Other Brother         blood disease     Prostate cancer Brother     Cancer Brother     Prostate cancer Brother     Cancer Brother     Cancer Brother     Malig Hyperthermia Neg Hx     Colon cancer Neg Hx        Home  Medications:  Prior to Admission medications    Medication Sig Start Date End Date Taking? Authorizing Provider   albuterol sulfate  (90 Base) MCG/ACT inhaler Inhale 2 puffs Every 4 (Four) Hours As Needed for Wheezing.    Sarah Parker MD   budesonide-formoterol (SYMBICORT) 160-4.5 MCG/ACT inhaler Inhale 2 puffs 2 (Two) Times a Day.    Sarah Parker MD   carvedilol (Coreg) 25 MG tablet Take 1 tablet by mouth 2 (Two) Times a Day With Meals for 30 days. 11/5/23 4/1/24  Patrick Turcios MD   dexlansoprazole (DEXILANT) 60 MG capsule Take 1 capsule by mouth Daily. 9/23/22   Sarah Parker MD   doxycycline (MONODOX) 100 MG capsule Take 1 capsule by mouth 2 (Two) Times a Day for 7 days. 4/17/24 4/24/24  Ghassan Torres MD   ergocalciferol (ERGOCALCIFEROL) 1.25 MG (53050 UT) capsule Take 1 capsule by mouth Every 14 (Fourteen) Days. 3/4/22   Emergency, Nurse Epic, RN   hydrALAZINE (APRESOLINE) 25 MG tablet Take 1 tablet by mouth Every 12 (Twelve) Hours. 3/9/24   Emily Chandra MD   levocetirizine (XYZAL) 5 MG tablet TAKE 1 TABLET DAILY  Patient taking differently: Take 1 tablet by mouth Every Evening. 6/23/23   Paloma James MD   linagliptin (Tradjenta) 5 MG tablet tablet Take 1 tablet by mouth Daily. 1/10/22   Paloma James MD   nitroglycerin (NITROSTAT) 0.4 MG SL tablet Place 1 tablet under the tongue Every 5 (Five) Minutes As Needed for Chest Pain.    Sarah Parker MD   polycarbophil (calcium polycarbophil) 625 MG tablet tablet Take 1 tablet by mouth Daily As Needed.    Sarah Parker MD   rosuvastatin (CRESTOR) 20 MG tablet Take 1 tablet by mouth Daily. 7/7/23   León Sanchez MD   sevelamer (RENVELA) 800 MG tablet Take 1 tablet by mouth 3 (Three) Times a Day With Meals. 10/9/23   Sarah Parker MD   traMADol (ULTRAM) 50 MG tablet Take 1 tablet by mouth Every 8 (Eight) Hours As Needed for Moderate Pain for up to 30 days. 4/3/24 5/3/24   "Rafael Lyons MD   Trulicity 1.5 MG/0.5ML solution pen-injector Inject 1.5 mg under the skin into the appropriate area as directed Every 14 (Fourteen) Days.    Provider, MD Sarah        Social History:   Social History     Tobacco Use    Smoking status: Former     Current packs/day: 0.00     Average packs/day: 1 pack/day for 52.5 years (52.5 ttl pk-yrs)     Types: Cigarettes     Start date:      Quit date: 2004     Years since quittin.8     Passive exposure: Past    Smokeless tobacco: Never   Vaping Use    Vaping status: Never Used   Substance Use Topics    Alcohol use: Never    Drug use: Never         Review of Systems:  Review of Systems   Constitutional:  Positive for chills and fatigue. Negative for fever.   HENT:  Negative for congestion and sore throat.    Eyes:  Negative for redness and visual disturbance.   Respiratory:  Positive for cough, shortness of breath and wheezing.    Cardiovascular:  Positive for chest pain. Negative for leg swelling.   Gastrointestinal:  Negative for abdominal pain, diarrhea, nausea and vomiting.   Genitourinary:  Positive for flank pain. Negative for dysuria and frequency.   Musculoskeletal:  Negative for gait problem.   Skin:  Negative for pallor and rash.   Neurological:  Positive for dizziness. Negative for syncope, weakness and headaches.   Psychiatric/Behavioral:  Negative for behavioral problems and confusion.         Physical Exam:  BP (!) 145/39 (BP Location: Right arm, Patient Position: Sitting)   Pulse 92   Temp 98.3 °F (36.8 °C) (Oral)   Resp 19   Ht 165.1 cm (65\")   Wt 61.6 kg (135 lb 12.9 oz)   LMP  (LMP Unknown)   SpO2 100%   BMI 22.60 kg/m²     Physical Exam  Vitals and nursing note reviewed.   Constitutional:       General: She is not in acute distress.     Appearance: Normal appearance. She is not toxic-appearing.   HENT:      Head: Normocephalic and atraumatic.      Jaw: There is normal jaw occlusion.   Eyes:      General: Lids are " normal.      Extraocular Movements: Extraocular movements intact.      Conjunctiva/sclera: Conjunctivae normal.      Pupils: Pupils are equal, round, and reactive to light.   Cardiovascular:      Rate and Rhythm: Normal rate and regular rhythm.      Pulses: Normal pulses.      Heart sounds: Normal heart sounds.   Pulmonary:      Effort: Tachypnea present. No respiratory distress.      Breath sounds: Wheezing present. No rhonchi.   Abdominal:      General: Abdomen is flat. Bowel sounds are normal.      Palpations: Abdomen is soft.      Tenderness: There is no abdominal tenderness. There is no guarding or rebound.   Musculoskeletal:         General: Normal range of motion.      Cervical back: Normal range of motion and neck supple.      Right lower leg: No edema.      Left lower leg: No edema.   Skin:     General: Skin is warm and dry.   Neurological:      General: No focal deficit present.      Mental Status: She is alert and oriented to person, place, and time. Mental status is at baseline.   Psychiatric:         Mood and Affect: Mood normal.                  Procedures:  Procedures      Medical Decision Making:      Comorbidities that affect care:    ESRD on hemodialysis, COPD    External Notes reviewed:    Hospital Discharge Summary: After admission for fluid overload      The following orders were placed and all results were independently analyzed by me:  Orders Placed This Encounter   Procedures    Blood Culture - Blood,    Blood Culture - Blood,    XR Chest 1 View    [place] Draw    Comprehensive Metabolic Panel    BNP    Single High Sensitivity Troponin T    CBC Auto Differential    High Sensitivity Troponin T 2Hr    Lactic Acid, Plasma    NPO Diet NPO Type: Strict NPO    Undress & Gown    Continuous Pulse Oximetry    Vital Signs    IP General Consult (Use specialty-specific consult if known)    Oxygen Therapy- Nasal Cannula; Titrate 1-6 LPM Per SpO2; 90 - 95%    ECG 12 Lead ED Triage Standing Order; SOA     Insert Peripheral IV    CBC & Differential    Green Top (Gel)    Lavender Top    Gold Top - SST    Light Blue Top    Extra Tubes    Gray Top       Medications Given in the Emergency Department:  Medications   sodium chloride 0.9 % flush 10 mL (has no administration in time range)   methylPREDNISolone sodium succinate (SOLU-Medrol) injection 125 mg (has no administration in time range)   sodium chloride 0.9 % bolus 500 mL (has no administration in time range)   cefTRIAXone (ROCEPHIN) 1000 mg/100 mL 0.9% NS (MBP) (has no administration in time range)   AZITHROMYCIN 500 MG/250 ML 0.9% NS IVPB (vial-mate) (has no administration in time range)   ipratropium-albuterol (DUO-NEB) nebulizer solution 3 mL (3 mL Nebulization Given 4/21/24 1558)        ED Course:         Labs:    Lab Results (last 24 hours)       Procedure Component Value Units Date/Time    CBC & Differential [[id number]]  (Abnormal) Collected: 04/21/24 1350    Specimen: Blood Updated: 04/21/24 1402    Narrative:      The following orders were created for panel order CBC & Differential.  Procedure                               Abnormality         Status                     ---------                               -----------         ------                     CBC Auto Differential[[id number]]        Abnormal            Final result                 Please view results for these tests on the individual orders.    Comprehensive Metabolic Panel [[id number]]  (Abnormal) Collected: 04/21/24 1350    Specimen: Blood Updated: 04/21/24 1424     Glucose 148 mg/dL      BUN 48 mg/dL      Creatinine 4.21 mg/dL      Sodium 138 mmol/L      Potassium 4.2 mmol/L      Comment: Slight hemolysis detected by analyzer. Result may be falsely elevated.        Chloride 100 mmol/L      CO2 25.3 mmol/L      Calcium 7.9 mg/dL      Total Protein 5.8 g/dL      Albumin 3.3 g/dL      ALT (SGPT) 10 U/L      AST (SGOT) 12 U/L      Alkaline Phosphatase 164 U/L      Total Bilirubin 0.3 mg/dL       Globulin 2.5 gm/dL      A/G Ratio 1.3 g/dL      BUN/Creatinine Ratio 11.4     Anion Gap 12.7 mmol/L      eGFR 9.7 mL/min/1.73      Comment: <15 Indicative of kidney failure       Narrative:      GFR Normal >60  Chronic Kidney Disease <60  Kidney Failure <15    The GFR formula is only valid for adults with stable renal function between ages 18 and 70.    BNP [[id number]]  (Abnormal) Collected: 04/21/24 1350    Specimen: Blood Updated: 04/21/24 1418     proBNP 7,318.0 pg/mL     Narrative:      This assay is used as an aid in the diagnosis of individuals suspected of having heart failure. It can be used as an aid in the diagnosis of acute decompensated heart failure (ADHF) in patients presenting with signs and symptoms of ADHF to the emergency department (ED). In addition, NT-proBNP of <300 pg/mL indicates ADHF is not likely.    Age Range Result Interpretation  NT-proBNP Concentration (pg/mL:      <50             Positive            >450                   Gray                 300-450                    Negative             <300    50-75           Positive            >900                  Gray                300-900                  Negative            <300      >75             Positive            >1800                  Gray                300-1800                  Negative            <300    Single High Sensitivity Troponin T [[id number]]  (Abnormal) Collected: 04/21/24 1350    Specimen: Blood Updated: 04/21/24 1432     HS Troponin T 68 ng/L     Narrative:      High Sensitive Troponin T Reference Range:  <14.0 ng/L- Negative Female for AMI  <22.0 ng/L- Negative Male for AMI  >=14 - Abnormal Female indicating possible myocardial injury.  >=22 - Abnormal Male indicating possible myocardial injury.   Clinicians would have to utilize clinical acumen, EKG, Troponin, and serial changes to determine if it is an Acute Myocardial Infarction or myocardial injury due to an underlying chronic condition.         CBC Auto Differential  [[id number]]  (Abnormal) Collected: 04/21/24 1350    Specimen: Blood Updated: 04/21/24 1402     WBC 7.66 10*3/mm3      RBC 2.80 10*6/mm3      Hemoglobin 8.5 g/dL      Hematocrit 28.2 %      .7 fL      MCH 30.4 pg      MCHC 30.1 g/dL      RDW 14.7 %      RDW-SD 53.5 fl      MPV 10.3 fL      Platelets 150 10*3/mm3      Neutrophil % 80.3 %      Lymphocyte % 7.7 %      Monocyte % 8.9 %      Eosinophil % 2.3 %      Basophil % 0.3 %      Immature Grans % 0.5 %      Neutrophils, Absolute 6.15 10*3/mm3      Lymphocytes, Absolute 0.59 10*3/mm3      Monocytes, Absolute 0.68 10*3/mm3      Eosinophils, Absolute 0.18 10*3/mm3      Basophils, Absolute 0.02 10*3/mm3      Immature Grans, Absolute 0.04 10*3/mm3      nRBC 0.0 /100 WBC     High Sensitivity Troponin T 2Hr [[id number]]  (Abnormal) Collected: 04/21/24 1552    Specimen: Blood from Arm, Right Updated: 04/21/24 1636     HS Troponin T 66 ng/L      Troponin T Delta -2 ng/L     Narrative:      High Sensitive Troponin T Reference Range:  <14.0 ng/L- Negative Female for AMI  <22.0 ng/L- Negative Male for AMI  >=14 - Abnormal Female indicating possible myocardial injury.  >=22 - Abnormal Male indicating possible myocardial injury.   Clinicians would have to utilize clinical acumen, EKG, Troponin, and serial changes to determine if it is an Acute Myocardial Infarction or myocardial injury due to an underlying chronic condition.                  Imaging:    XR Chest 1 View    Result Date: 4/21/2024  XR CHEST 1 VW-  Date of Exam: 4/21/2024 1:43 PM  Indication: SOA Triage Protocol  Comparison: 4/17/2024  Findings: Postsurgical changes in the left hemithorax with shift of mediastinal structures from right to left appear stable.  Linear opacity in the right upper lobe is stable.  Right jugular central venous catheter is in unchanged position, the tip is in the mid SVC.  Bony structures appear intact.      Impression: No significant change in comparison to 4/17/2024, as above.    Electronically Signed By-SAVI LUCERO MD On:4/21/2024 1:48 PM         Differential Diagnosis and Discussion:    Dyspnea: Differential diagnosis includes but is not limited to metabolic acidosis, neurological disorders, psychogenic, asthma, pneumothorax, upper airway obstruction, COPD, pneumonia, noncardiogenic pulmonary edema, interstitial lung disease, anemia, congestive heart failure, and pulmonary embolism    All labs were reviewed and interpreted by me.  All X-rays impressions were independently interpreted by me.    MDM  Number of Diagnoses or Management Options  Acute exacerbation of chronic obstructive pulmonary disease (COPD)  Diagnosis management comments: In summary this is an 89-year-old female with a history of ESRD on hemodialysis and COPD who presents to the emerged department for evaluation of shortness of breath.  On clinical examination she is wheezing throughout all lung fields.  Chest x-ray independent reviewed and interpreted by me is unremarkable for acute change, unchanged from previous.  CBC independently reviewed by me and shows no critical abnormalities.  CMP independently reviewed by me and shows no critical abnormalities except changes consistent with CKD, potassium normal.  Patient was given steroids and breathing treatment with no improvement of the wheezing.  Patient is still tachypneic as well requiring supplemental nasal cannula O2 for comfort.  Case was discussed with patient's PCP who will admit for acute exacerbation of COPD at this time.                 Patient Care Considerations:    CONSULT: I considered consulting nephrology, however no signs of fluid overload      Consultants/Shared Management Plan:    PCP: I have discussed the case with Dr. Turcios who agrees to accept the patient for admission.    Social Determinants of Health:    Patient is independent, reliable, and has access to care.       Disposition and Care Coordination:    Admit:   Through independent evaluation of  the patient's history, physical, and imperical data, the patient meets criteria for inpatient admission to the hospital.        Final diagnoses:   Acute exacerbation of chronic obstructive pulmonary disease (COPD)        ED Disposition       ED Disposition   Decision to Admit    Condition   --    Comment   --               This medical record created using voice recognition software.           Rick Torres MD  04/21/24 0006

## 2024-04-21 NOTE — PAYOR COMM NOTE
"Charlette Rai (89 y.o. Female)     PATIENT INFORMATION  Name:  Charlette Rai  MRN#:     [id number]  :  1935         ADMISSION INFORMATION  CLASS: Observation   DOS:  24        CURRENT ATTENDING PROVIDER INFORMATION  Name/NPI: Rafael Lyons MD [[id number]]  Phone:             Phone: [phone]        REQUESTING PROVIDER and RENDERING FACILITY  Name:  [facility]   NPI:  [id number]  TID:  [id number]  Address:      [facility] [address] [facility]  Phone  [phone]        UTILIZATION REVIEW CONTACT INFORMATION  Phone:      [phone]  Fax:           [phone]        ADMISSION DIAGNOSIS  COPD with acute exacerbation [J44.1]    ++++++++++++++++++++++++++++++++++++++++++++++++++++++++++++++++++++++++++++++++      Date of Birth   1935    Social Security Number       Address   [address] [id number]    Home Phone   [phone]    MRN   [id number]       [employer]   [employer]    Marital Status                               Admission Date   24    Admission Type   Emergency    Admitting Provider   Rafael Lyons MD    Attending Provider   Rafael Lyons MD    Department, Room/Bed   [facility] EMERGENCY ROOM,        Discharge Date       Discharge Disposition       Discharge Destination                                 Attending Provider: Rafael Lyons MD    Allergies: No Known Allergies    Isolation: Contact Air   Infection: COVID (rule out) (24)   Code Status: CPR    Ht: 165.1 cm (65\")   Wt: 61.6 kg (135 lb 12.9 oz)    Admission Cmt: None   Principal Problem: None                  Active Insurance as of 2024       Primary Coverage       Payor Plan Insurance Group Employer/Plan Group    HUMANA MEDICARE REPLACEMENT HUMANA MED ADV GROUP [id number]       Payor Plan Address Payor Plan Phone Number Payor Plan Fax Number Effective Dates    [address] [address] [phone]  2024 - None Entered    [facility] [id number]         " Subscriber Name Subscriber Birth Date Member ID       JOSEFINA RIOS 1935 [id number]                      Chronic Obstructive Pulmonary Disease [facility] Observation [facility]       Indications Met   Last updated by Tanvi Dietrich RN on 4/21/2024 1900     Review Status Created By   Primary Completed Tanvi Dietrich RN      Criteria Review   Chronic Obstructive Pulmonary Disease [facility] Observation Care     Overall Determination: Indications Met     Criteria:  [×] Observation care is indicated for  1 or more  of the following :      [×] New or worsened (eg, from baseline) signs or symptoms of COPD (eg, dyspnea, Tachypnea) with inadequate response to initial emergency department treatment          4/21/2024  7:00 PM              -- 4/21/2024  7:00 PM by Tanvi Dietrich RN --                  c/o worsening SOA & intermittent CP. Has been compliant with Hemodialysis, meds & Home O2 (3L/min).     Notes:  -- 4/21/2024  7:00 PM by Tanvi Dietrich RN --      To ED c/o SOA X 3-4 days. Is on home O2 @ 3L. Has not missed hemodialysis. Has been compliant with meds but cough & wheezing are worsening.  Also c/o intermittent CP.                   PMHx: ASthma; CKD; CHF; COPD; CAD; DVT; DM; GERD. HLD.  Femoral artery stent. L TKR.                   In ED: Trop 68. Repeat trop 66. BNP 7318.0; BUN 48; Creatinine 4.21; GFR 9.7; Calcium 7.8. .       H/H 8.5/28;                   In ED: O2 3L (home liter flow); Rocephin IV; Zithromax IV; IV NS 500ml bolus; SoluMedrol IV; Duoneb.                   Admit: OBS. Nephrology consult.  Hemodialysis tomorrow. O2; PT/OT; Duonebs q4h prn; Solumedrol IV qd; Zirthromax PO qd; Brovana/Pulmicort nebs bid; Dilaudid IV prn; Zofran IV prn; Labs in AM                Emergency Department Notes        Rick Torres MD at 04/21/24 1506          Time: 3:06 PM EDT  Date of encounter:  4/21/2024  Independent Historian/Clinical History and Information was obtained by:   Patient    History is limited by:  N/A    Chief Complaint: Shortness of breath      History of Present Illness:  Patient is a 89 y.o. year old female who presents to the emergency department for evaluation of shortness of breath.  Patient states she has been experiencing worsening shortness of breath since Wednesday.  Patient has a past medical history of chronic kidney disease and COPD and is on 3 L of oxygen at home.  She goes to dialysis Monday, Wednesday, and Fridays and states she has not missed any appointments. She admits to worsening cough and wheeze. She states her inhalers helped temporarily. She states she is also having right-sided flank pain and chest pain.    HPI    Patient Care Team  Primary Care Provider: Rafael Lyons MD    Past Medical History:     No Known Allergies  Past Medical History:   Diagnosis Date    Allergic rhinitis     Anemia     Arthritis     Asthma     USE INHALERS AND NEBULIZERS    Back pain     Bladder disorder     Cancer     LEFT LUNG CANCER 2005 SURGERY AND CHEMO DONE  AND CURRENTLY 4/ 14/22  RIGHT LUNG CANCER HAS ONLY RECEIVED RADIATION THUS FAR    Chronic kidney disease     stage 3    CKD (chronic kidney disease) stage 4, GFR 15-29 ml/min     Condition not found     ulcer    Congestive heart failure (CHF)     FOLLOWED BY DR AQUINO. DENIES CP BUT DOES HAVE SOA CHRONIC ISSUE COPD/LUNG CANCER    COPD (chronic obstructive pulmonary disease)     FOLLOWED BY DR MARIAJOSE BAILEY    Coronary artery disease     DENIES CP BUT DOES GET SOA MOST OF THE TIME WITH EXERTION BUT OCC AT REST CHRONIC ISSUE COPD/LUNG CANCER    Deep vein thrombosis     Diabetes mellitus     DOES NOT CHECK BS DAILY    Disease of thyroid gland     HYPOTHYROIDISM    Essential hypertension     Gastric ulcer     GERD (gastroesophageal reflux disease)     Heart murmur     History of transfusion     NO ISSUES POST TRANSFUSION WAS MANY YEARS AGO    Hyperlipemia     Leukocytopenia     FOLLOWED BY DR MIR BAILEY    Limb swelling     Lumbago     Lumbar spinal stenosis      Lung cancer     Migraine headache     Multiple joint pain     On home oxygen therapy     3L/NC PRN    Osteopenia     Reflux esophagitis     Shortness of breath     Thyroid nodule     HAS ULTRASOUND YEARLY BEING MONITORED    Vascular disease     Vitamin D deficiency      Past Surgical History:   Procedure Laterality Date    ABDOMINAL SURGERY      APPENDECTOMY      ARTERIOVENOUS FISTULA/SHUNT SURGERY Left 05/10/2022    Procedure: Left basilic vein transposition;  Surgeon: Wan Barksdale MD;  Location: [facility] MAIN OR;  Service: Vascular;  Laterality: Left;    ARTERIOVENOUS FISTULA/SHUNT SURGERY Left 3/23/2023    Procedure: Ligation of left arm arteriovenous fistula;  Surgeon: Wan Barksdale MD;  Location: [facility] MAIN OR;  Service: Vascular;  Laterality: Left;    ARTERIOVENOUS FISTULA/SHUNT SURGERY Left 11/30/2023    Procedure: Creation of left arm arteriovenous graft;  Surgeon: Wan Barksdael MD;  Location: [facility] MAIN OR;  Service: Vascular;  Laterality: Left;    CARDIAC CATHETERIZATION  1996    CARDIAC SURGERY      CARDIAC SURGERY      fluid drained from heart    CATARACT EXTRACTION, BILATERAL  2003    COLONOSCOPY  2014    ENDOSCOPY  2016 2019    FEMORAL ARTERY STENT Bilateral     HYSTERECTOMY      LUNG BIOPSY Left 2005    lobectomy upper lung caner    LUNG VOLUME REDUCTION      OTHER SURGICAL HISTORY      artifical joints/limbs    REPLACEMENT TOTAL KNEE Left 2016    UPPER GASTROINTESTINAL ENDOSCOPY       Family History   Problem Relation Age of Onset    Arthritis Mother     Arthritis Father     Cancer Brother     Diabetes Brother     Other Brother         blood disease     Prostate cancer Brother     Cancer Brother     Prostate cancer Brother     Cancer Brother     Cancer Brother     Malig Hyperthermia Neg Hx     Colon cancer Neg Hx        Home Medications:  Prior to Admission medications    Medication Sig Start Date End Date Taking? Authorizing Provider   albuterol sulfate  (90 Base) MCG/ACT inhaler  Inhale 2 puffs Every 4 (Four) Hours As Needed for Wheezing.    Sarah Parker MD   budesonide-formoterol (SYMBICORT) 160-4.5 MCG/ACT inhaler Inhale 2 puffs 2 (Two) Times a Day.    Sarah Parker MD   carvedilol (Coreg) 25 MG tablet Take 1 tablet by mouth 2 (Two) Times a Day With Meals for 30 days. 11/5/23 4/1/24  Patrick Turcios MD   dexlansoprazole (DEXILANT) 60 MG capsule Take 1 capsule by mouth Daily. 9/23/22   Sarah Parker MD   doxycycline (MONODOX) 100 MG capsule Take 1 capsule by mouth 2 (Two) Times a Day for 7 days. 4/17/24 4/24/24  Ghassan Torres MD   ergocalciferol (ERGOCALCIFEROL) 1.25 MG (46049 UT) capsule Take 1 capsule by mouth Every 14 (Fourteen) Days. 3/4/22   Emergency, Nurse Emy, RN   hydrALAZINE (APRESOLINE) 25 MG tablet Take 1 tablet by mouth Every 12 (Twelve) Hours. 3/9/24   Emily Chandra MD   levocetirizine (XYZAL) 5 MG tablet TAKE 1 TABLET DAILY  Patient taking differently: Take 1 tablet by mouth Every Evening. 6/23/23   Paloma James MD   linagliptin (Tradjenta) 5 MG tablet tablet Take 1 tablet by mouth Daily. 1/10/22   Paloma James MD   nitroglycerin (NITROSTAT) 0.4 MG SL tablet Place 1 tablet under the tongue Every 5 (Five) Minutes As Needed for Chest Pain.    Sarah Parker MD   polycarbophil (calcium polycarbophil) 625 MG tablet tablet Take 1 tablet by mouth Daily As Needed.    Sarah Parker MD   rosuvastatin (CRESTOR) 20 MG tablet Take 1 tablet by mouth Daily. 7/7/23   León Sanchez MD   sevelamer (RENVELA) 800 MG tablet Take 1 tablet by mouth 3 (Three) Times a Day With Meals. 10/9/23   Sarah Parker MD   traMADol (ULTRAM) 50 MG tablet Take 1 tablet by mouth Every 8 (Eight) Hours As Needed for Moderate Pain for up to 30 days. 4/3/24 5/3/24  Rafael Lyons MD Trulicity 1.5 MG/0.5ML solution pen-injector Inject 1.5 mg under the skin into the appropriate area as directed Every 14 (Fourteen) Days.     "Provider, Historical, MD        Social History:   Social History     Tobacco Use    Smoking status: Former     Current packs/day: 0.00     Average packs/day: 1 pack/day for 52.5 years (52.5 ttl pk-yrs)     Types: Cigarettes     Start date:      Quit date: 2004     Years since quittin.8     Passive exposure: Past    Smokeless tobacco: Never   Vaping Use    Vaping status: Never Used   Substance Use Topics    Alcohol use: Never    Drug use: Never         Review of Systems:  Review of Systems   Constitutional:  Positive for chills and fatigue. Negative for fever.   HENT:  Negative for congestion and sore throat.    Eyes:  Negative for redness and visual disturbance.   Respiratory:  Positive for cough, shortness of breath and wheezing.    Cardiovascular:  Positive for chest pain. Negative for leg swelling.   Gastrointestinal:  Negative for abdominal pain, diarrhea, nausea and vomiting.   Genitourinary:  Positive for flank pain. Negative for dysuria and frequency.   Musculoskeletal:  Negative for gait problem.   Skin:  Negative for pallor and rash.   Neurological:  Positive for dizziness. Negative for syncope, weakness and headaches.   Psychiatric/Behavioral:  Negative for behavioral problems and confusion.         Physical Exam:  BP (!) 145/39 (BP Location: Right arm, Patient Position: Sitting)   Pulse 92   Temp 98.3 °F (36.8 °C) (Oral)   Resp 19   Ht 165.1 cm (65\")   Wt 61.6 kg (135 lb 12.9 oz)   LMP  (LMP Unknown)   SpO2 100%   BMI 22.60 kg/m²     Physical Exam  Vitals and nursing note reviewed.   Constitutional:       General: She is not in acute distress.     Appearance: Normal appearance. She is not toxic-appearing.   HENT:      Head: Normocephalic and atraumatic.      Jaw: There is normal jaw occlusion.   Eyes:      General: Lids are normal.      Extraocular Movements: Extraocular movements intact.      Conjunctiva/sclera: Conjunctivae normal.      Pupils: Pupils are equal, round, and reactive " to light.   Cardiovascular:      Rate and Rhythm: Normal rate and regular rhythm.      Pulses: Normal pulses.      Heart sounds: Normal heart sounds.   Pulmonary:      Effort: Tachypnea present. No respiratory distress.      Breath sounds: Wheezing present. No rhonchi.   Abdominal:      General: Abdomen is flat. Bowel sounds are normal.      Palpations: Abdomen is soft.      Tenderness: There is no abdominal tenderness. There is no guarding or rebound.   Musculoskeletal:         General: Normal range of motion.      Cervical back: Normal range of motion and neck supple.      Right lower leg: No edema.      Left lower leg: No edema.   Skin:     General: Skin is warm and dry.   Neurological:      General: No focal deficit present.      Mental Status: She is alert and oriented to person, place, and time. Mental status is at baseline.   Psychiatric:         Mood and Affect: Mood normal.                  Procedures:  Procedures      Medical Decision Making:      Comorbidities that affect care:    ESRD on hemodialysis, COPD    External Notes reviewed:    Hospital Discharge Summary: After admission for fluid overload      The following orders were placed and all results were independently analyzed by me:  Orders Placed This Encounter   Procedures    Blood Culture - Blood,    Blood Culture - Blood,    XR Chest 1 View    [place] Draw    Comprehensive Metabolic Panel    BNP    Single High Sensitivity Troponin T    CBC Auto Differential    High Sensitivity Troponin T 2Hr    Lactic Acid, Plasma    NPO Diet NPO Type: Strict NPO    Undress & Gown    Continuous Pulse Oximetry    Vital Signs    IP General Consult (Use specialty-specific consult if known)    Oxygen Therapy- Nasal Cannula; Titrate 1-6 LPM Per SpO2; 90 - 95%    ECG 12 Lead ED Triage Standing Order; SOA    Insert Peripheral IV    CBC & Differential    Green Top (Gel)    Lavender Top    Gold Top - SST    Light Blue Top    Extra Tubes    Gray Top       Medications Given  in the Emergency Department:  Medications   sodium chloride 0.9 % flush 10 mL (has no administration in time range)   methylPREDNISolone sodium succinate (SOLU-Medrol) injection 125 mg (has no administration in time range)   sodium chloride 0.9 % bolus 500 mL (has no administration in time range)   cefTRIAXone (ROCEPHIN) 1000 mg/100 mL 0.9% NS (MBP) (has no administration in time range)   AZITHROMYCIN 500 MG/250 ML 0.9% NS IVPB (vial-mate) (has no administration in time range)   ipratropium-albuterol (DUO-NEB) nebulizer solution 3 mL (3 mL Nebulization Given 4/21/24 6348)        ED Course:         Labs:    Lab Results (last 24 hours)       Procedure Component Value Units Date/Time    CBC & Differential [[id number]]  (Abnormal) Collected: 04/21/24 1350    Specimen: Blood Updated: 04/21/24 1402    Narrative:      The following orders were created for panel order CBC & Differential.  Procedure                               Abnormality         Status                     ---------                               -----------         ------                     CBC Auto Differential[[id number]]        Abnormal            Final result                 Please view results for these tests on the individual orders.    Comprehensive Metabolic Panel [[id number]]  (Abnormal) Collected: 04/21/24 1350    Specimen: Blood Updated: 04/21/24 1424     Glucose 148 mg/dL      BUN 48 mg/dL      Creatinine 4.21 mg/dL      Sodium 138 mmol/L      Potassium 4.2 mmol/L      Comment: Slight hemolysis detected by analyzer. Result may be falsely elevated.        Chloride 100 mmol/L      CO2 25.3 mmol/L      Calcium 7.9 mg/dL      Total Protein 5.8 g/dL      Albumin 3.3 g/dL      ALT (SGPT) 10 U/L      AST (SGOT) 12 U/L      Alkaline Phosphatase 164 U/L      Total Bilirubin 0.3 mg/dL      Globulin 2.5 gm/dL      A/G Ratio 1.3 g/dL      BUN/Creatinine Ratio 11.4     Anion Gap 12.7 mmol/L      eGFR 9.7 mL/min/1.73      Comment: <15 Indicative of kidney  failure       Narrative:      GFR Normal >60  Chronic Kidney Disease <60  Kidney Failure <15    The GFR formula is only valid for adults with stable renal function between ages 18 and 70.    BNP [[id number]]  (Abnormal) Collected: 04/21/24 1350    Specimen: Blood Updated: 04/21/24 1418     proBNP 7,318.0 pg/mL     Narrative:      This assay is used as an aid in the diagnosis of individuals suspected of having heart failure. It can be used as an aid in the diagnosis of acute decompensated heart failure (ADHF) in patients presenting with signs and symptoms of ADHF to the emergency department (ED). In addition, NT-proBNP of <300 pg/mL indicates ADHF is not likely.    Age Range Result Interpretation  NT-proBNP Concentration (pg/mL:      <50             Positive            >450                   Gray                 300-450                    Negative             <300    50-75           Positive            >900                  Gray                300-900                  Negative            <300      >75             Positive            >1800                  Gray                300-1800                  Negative            <300    Single High Sensitivity Troponin T [[id number]]  (Abnormal) Collected: 04/21/24 1350    Specimen: Blood Updated: 04/21/24 1432     HS Troponin T 68 ng/L     Narrative:      High Sensitive Troponin T Reference Range:  <14.0 ng/L- Negative Female for AMI  <22.0 ng/L- Negative Male for AMI  >=14 - Abnormal Female indicating possible myocardial injury.  >=22 - Abnormal Male indicating possible myocardial injury.   Clinicians would have to utilize clinical acumen, EKG, Troponin, and serial changes to determine if it is an Acute Myocardial Infarction or myocardial injury due to an underlying chronic condition.         CBC Auto Differential [[id number]]  (Abnormal) Collected: 04/21/24 1350    Specimen: Blood Updated: 04/21/24 1402     WBC 7.66 10*3/mm3      RBC 2.80 10*6/mm3      Hemoglobin 8.5 g/dL       Hematocrit 28.2 %      .7 fL      MCH 30.4 pg      MCHC 30.1 g/dL      RDW 14.7 %      RDW-SD 53.5 fl      MPV 10.3 fL      Platelets 150 10*3/mm3      Neutrophil % 80.3 %      Lymphocyte % 7.7 %      Monocyte % 8.9 %      Eosinophil % 2.3 %      Basophil % 0.3 %      Immature Grans % 0.5 %      Neutrophils, Absolute 6.15 10*3/mm3      Lymphocytes, Absolute 0.59 10*3/mm3      Monocytes, Absolute 0.68 10*3/mm3      Eosinophils, Absolute 0.18 10*3/mm3      Basophils, Absolute 0.02 10*3/mm3      Immature Grans, Absolute 0.04 10*3/mm3      nRBC 0.0 /100 WBC     High Sensitivity Troponin T 2Hr [[id number]]  (Abnormal) Collected: 04/21/24 1552    Specimen: Blood from Arm, Right Updated: 04/21/24 1636     HS Troponin T 66 ng/L      Troponin T Delta -2 ng/L     Narrative:      High Sensitive Troponin T Reference Range:  <14.0 ng/L- Negative Female for AMI  <22.0 ng/L- Negative Male for AMI  >=14 - Abnormal Female indicating possible myocardial injury.  >=22 - Abnormal Male indicating possible myocardial injury.   Clinicians would have to utilize clinical acumen, EKG, Troponin, and serial changes to determine if it is an Acute Myocardial Infarction or myocardial injury due to an underlying chronic condition.                  Imaging:    XR Chest 1 View    Result Date: 4/21/2024  XR CHEST 1 VW-  Date of Exam: 4/21/2024 1:43 PM  Indication: SOA Triage Protocol  Comparison: 4/17/2024  Findings: Postsurgical changes in the left hemithorax with shift of mediastinal structures from right to left appear stable.  Linear opacity in the right upper lobe is stable.  Right jugular central venous catheter is in unchanged position, the tip is in the mid SVC.  Bony structures appear intact.      Impression: No significant change in comparison to 4/17/2024, as above.   Electronically Signed By-SAVI LUCERO MD On:4/21/2024 1:48 PM         Differential Diagnosis and Discussion:    Dyspnea: Differential diagnosis includes but is not  limited to metabolic acidosis, neurological disorders, psychogenic, asthma, pneumothorax, upper airway obstruction, COPD, pneumonia, noncardiogenic pulmonary edema, interstitial lung disease, anemia, congestive heart failure, and pulmonary embolism    All labs were reviewed and interpreted by me.  All X-rays impressions were independently interpreted by me.    MDM  Number of Diagnoses or Management Options  Acute exacerbation of chronic obstructive pulmonary disease (COPD)  Diagnosis management comments: In summary this is an 89-year-old female with a history of ESRD on hemodialysis and COPD who presents to the emerged department for evaluation of shortness of breath.  On clinical examination she is wheezing throughout all lung fields.  Chest x-ray independent reviewed and interpreted by me is unremarkable for acute change, unchanged from previous.  CBC independently reviewed by me and shows no critical abnormalities.  CMP independently reviewed by me and shows no critical abnormalities except changes consistent with CKD, potassium normal.  Patient was given steroids and breathing treatment with no improvement of the wheezing.  Patient is still tachypneic as well requiring supplemental nasal cannula O2 for comfort.  Case was discussed with patient's PCP who will admit for acute exacerbation of COPD at this time.                 Patient Care Considerations:    CONSULT: I considered consulting nephrology, however no signs of fluid overload      Consultants/Shared Management Plan:    PCP: I have discussed the case with Dr. Turcios who agrees to accept the patient for admission.    Social Determinants of Health:    Patient is independent, reliable, and has access to care.       Disposition and Care Coordination:    Admit:   Through independent evaluation of the patient's history, physical, and imperical data, the patient meets criteria for inpatient admission to the hospital.        Final diagnoses:   Acute exacerbation  of chronic obstructive pulmonary disease (COPD)        ED Disposition       ED Disposition   Decision to Admit    Condition   --    Comment   --               This medical record created using voice recognition software.           Rick Torres MD  04/21/24 1721      Electronically signed by Rick Torres MD at 04/21/24 1721       Current Facility-Administered Medications   Medication Dose Route Frequency Provider Last Rate Last Admin    AZITHROMYCIN 500 MG/250 ML 0.9% NS IVPB (vial-mate)  500 mg Intravenous Once Rick Torres MD   500 mg at 04/21/24 1806    sodium chloride 0.9 % flush 10 mL  10 mL Intravenous PRN Rick Torres MD         Current Outpatient Medications   Medication Sig Dispense Refill    albuterol sulfate  (90 Base) MCG/ACT inhaler Inhale 2 puffs Every 4 (Four) Hours As Needed for Wheezing.      budesonide-formoterol (SYMBICORT) 160-4.5 MCG/ACT inhaler Inhale 2 puffs 2 (Two) Times a Day.      carvedilol (Coreg) 25 MG tablet Take 1 tablet by mouth 2 (Two) Times a Day With Meals for 30 days. 60 tablet 0    dexlansoprazole (DEXILANT) 60 MG capsule Take 1 capsule by mouth Daily.      doxycycline (MONODOX) 100 MG capsule Take 1 capsule by mouth 2 (Two) Times a Day for 7 days. 14 capsule 0    ergocalciferol (ERGOCALCIFEROL) 1.25 MG (29999 UT) capsule Take 1 capsule by mouth Every 14 (Fourteen) Days.      hydrALAZINE (APRESOLINE) 25 MG tablet Take 1 tablet by mouth Every 12 (Twelve) Hours. 60 tablet 0    levocetirizine (XYZAL) 5 MG tablet TAKE 1 TABLET DAILY (Patient taking differently: Take 1 tablet by mouth Every Evening.) 90 tablet 1    linagliptin (Tradjenta) 5 MG tablet tablet Take 1 tablet by mouth Daily. 90 tablet 1    polycarbophil (calcium polycarbophil) 625 MG tablet tablet Take 1 tablet by mouth Daily As Needed.      rosuvastatin (CRESTOR) 20 MG tablet Take 1 tablet by mouth Daily. 90 tablet 3    sevelamer (RENVELA) 800 MG tablet Take 1 tablet by mouth 3 (Three) Times a Day With  "Meals.      traMADol (ULTRAM) 50 MG tablet Take 1 tablet by mouth Every 8 (Eight) Hours As Needed for Moderate Pain for up to 30 days. 60 tablet 0    nitroglycerin (NITROSTAT) 0.4 MG SL tablet Place 1 tablet under the tongue Every 5 (Five) Minutes As Needed for Chest Pain.      Trulicity 1.5 MG/0.5ML solution pen-injector Inject 1.5 mg under the skin into the appropriate area as directed Every 14 (Fourteen) Days.       Orders (last 24 hrs)        Start     Ordered    04/22/24 0600  Hemodialysis Inpatient  Once         04/21/24 1833    04/21/24 1722  Code Status and Medical Interventions:  Continuous         04/21/24 1728    04/21/24 1721  Initiate Observation Status  Once         04/21/24 1728    04/21/24 1715  cefTRIAXone (ROCEPHIN) 1000 mg/100 mL 0.9% NS (MBP)  Once         04/21/24 1652    04/21/24 1715  AZITHROMYCIN 500 MG/250 ML 0.9% NS IVPB (vial-mate)  Once         04/21/24 1652    04/21/24 1700  methylPREDNISolone sodium succinate (SOLU-Medrol) injection 125 mg  Once         04/21/24 1631    04/21/24 1700  sodium chloride 0.9 % bolus 500 mL  Once         04/21/24 1631    04/21/24 1653  IP General Consult (Use specialty-specific consult if known)  Once        Provider:  Rafael Lyons MD    04/21/24 1652    04/21/24 1653  Lactic Acid, Plasma  Once         04/21/24 1652    04/21/24 1653  Blood Culture - Blood, Arm, Right  Once        Placed in \"And\" Linked Group    04/21/24 1652    04/21/24 1653  Blood Culture - Blood, Hand, Right  Once        Placed in \"And\" Linked Group    04/21/24 1652    04/21/24 1615  ipratropium-albuterol (DUO-NEB) nebulizer solution 3 mL  Once         04/21/24 1548    04/21/24 1550  High Sensitivity Troponin T 2Hr  PROCEDURE ONCE         04/21/24 1432    04/21/24 1359  Extra Tubes  Once         04/21/24 1358    04/21/24 1359  Gray Top  PROCEDURE ONCE         04/21/24 1358    04/21/24 1329  BNP  Once         04/21/24 1328    04/21/24 1329  Single High Sensitivity Troponin T  Once         " 04/21/24 1328    04/21/24 1328  NPO Diet NPO Type: Strict NPO  Diet Effective Now         04/21/24 1328    04/21/24 1328  Undress & Gown  Once         04/21/24 1328    04/21/24 1328  Cardiac Monitoring  Continuous        Comments: Follow Standing Orders As Outlined in Process Instructions (Open Order Report to View Full Instructions)    04/21/24 1328    04/21/24 1328  Continuous Pulse Oximetry  Continuous         04/21/24 1328    04/21/24 1328  Vital Signs  Per Hospital Policy         04/21/24 1328    04/21/24 1328  ECG 12 Lead ED Triage Standing Order; SOA  Once         04/21/24 1328    04/21/24 1328  XR Chest 1 View  1 Time Imaging         04/21/24 1328    04/21/24 1328  Insert Peripheral IV  Once         04/21/24 1328    04/21/24 1328  sodium chloride 0.9 % flush 10 mL  As Needed         04/21/24 1328    04/21/24 1328  Ozan Draw  Once         04/21/24 1328    04/21/24 1328  CBC & Differential  Once         04/21/24 1328    04/21/24 1328  Comprehensive Metabolic Panel  Once         04/21/24 1328    04/21/24 1328  Green Top (Gel)  PROCEDURE ONCE         04/21/24 1328    04/21/24 1328  Lavender Top  PROCEDURE ONCE         04/21/24 1328    04/21/24 1328  Gold Top - SST  PROCEDURE ONCE         04/21/24 1328    04/21/24 1328  Light Blue Top  PROCEDURE ONCE         04/21/24 1328    04/21/24 1328  CBC Auto Differential  PROCEDURE ONCE         04/21/24 1328    Unscheduled  Oxygen Therapy- Nasal Cannula; Titrate 1-6 LPM Per SpO2; 90 - 95%  Continuous PRN       04/21/24 1328    Signed and Held  Reason for COPD Admission: Bronchitis; Indicate COPD Diagnosis For Problem List: COPD exacerbation  Once         Signed and Held    Signed and Held  Document Pulse Oximetry - On Room Air / Home O2 Level  Daily      Comments: Room Air Oxygen Levels Should Only Be Measured if Patient Oxygen Needs are <4L  Home O2 Oxygen Levels Should Only Be Measured Once Patient Within 2L of Home O2 Baseline  Reapply Oxygen if O2 Sat Drops Below  "88%    Signed and Held    Signed and Held  Respiratory Treatment Education (MDI / Spacer / Nebulizer)  Daily      Comments: Document in Education Activity    Signed and Held    Signed and Held  COPD Education  Daily      Comments: Document in Education Activity    Signed and Held    Signed and Held  Cough / Deep Breathe  Every 4 Hours       Signed and Held    Signed and Held  Oscillating Positive Expiratory Pressure (OPEP)  2 Times Daily - RT       Signed and Held    Signed and Held  Vital Signs  Every 4 Hours       Signed and Held    Signed and Held  Activity - Ad Tania  Until Discontinued         Signed and Held    Signed and Held  Advance Diet As Tolerated -  Until Discontinued         Signed and Held    Signed and Held  Intake & Output  Every Shift       Signed and Held    Signed and Held  Weigh Patient  Once         Signed and Held    Signed and Held  Daily Weights  Daily       Signed and Held    Signed and Held  Oral Care  2 Times Daily       Signed and Held    Signed and Held  Inpatient Case Management  Consult  Once        Provider:  (Not yet assigned)    Signed and Held    Signed and Held  Insert Peripheral IV  Once         Signed and Held    Signed and Held  Saline Lock & Maintain IV Access  Continuous         Signed and Held    Signed and Held  sodium chloride 0.9 % flush 10 mL  Every 12 Hours Scheduled         Signed and Held    Signed and Held  sodium chloride 0.9 % flush 10 mL  As Needed         Signed and Held    Signed and Held  sodium chloride 0.9 % infusion 40 mL  As Needed         Signed and Held    Signed and Held  acetaminophen (TYLENOL) tablet 650 mg  Every 4 Hours PRN        Placed in \"Or\" Linked Group    Signed and Held    Signed and Held  acetaminophen (TYLENOL) 160 MG/5ML oral solution 650 mg  Every 4 Hours PRN        Placed in \"Or\" Linked Group    Signed and Held    Signed and Held  acetaminophen (TYLENOL) suppository 650 mg  Every 4 Hours PRN        Placed in \"Or\" Linked " "Group    Signed and Held    Signed and Held  HYDROmorphone (DILAUDID) injection 0.25 mg  Every 4 Hours PRN        Placed in \"And\" Linked Group    Signed and Held    Signed and Held  naloxone (NARCAN) injection 0.4 mg  Every 5 Minutes PRN        Placed in \"And\" Linked Group    Signed and Held    Signed and Held  calcium carbonate (TUMS) chewable tablet 500 mg (200 mg elemental)  2 Times Daily PRN         Signed and Held    Signed and Held  aluminum-magnesium hydroxide-simethicone (MAALOX MAX) 400-400-40 MG/5ML suspension 15 mL  Every 6 Hours PRN         Signed and Held    Signed and Held  famotidine (PEPCID) tablet 20 mg  Daily         Signed and Held    Signed and Held  ondansetron (ZOFRAN) injection 4 mg  Every 6 Hours PRN         Signed and Held    Signed and Held  melatonin tablet 5 mg  Nightly PRN         Signed and Held    Signed and Held  Place Sequential Compression Device  Once         Signed and Held    Signed and Held  Maintain Sequential Compression Device  Continuous         Signed and Held    Signed and Held  Pulse Oximetry,  Spot  Once         Signed and Held    Signed and Held  Up in Chair  As Needed         Signed and Held    Signed and Held  Incentive Spirometry  Every 4 Hours While Awake       Signed and Held    Signed and Held  Oxygen Therapy- Nasal Cannula; Titrate 1-6 LPM Per SpO2; 88 - 92%  Continuous PRN         Signed and Held    Signed and Held  Diet: Cardiac, Diabetic, Renal; Healthy Heart (2-3 Na+); Consistent Carbohydrate; Low Sodium (2-3g), Low Potassium, Low Phosphorus; Fluid Consistency: Thin (IDDSI 0)  Diet Effective Now         Signed and Held    Signed and Held  PT Consult: Eval & Treat Functional Mobility Below Baseline  Once         Signed and Held    Signed and Held  OT Consult: Eval & Treat ADL Performance Below Baseline  Once         Signed and Held    Signed and Held  CBC Auto Differential  Morning Draw         Signed and Held    Signed and Held  Basic Metabolic Panel  Morning " "Draw         Signed and Held    Signed and Held  Magnesium  Morning Draw         Signed and Held    Signed and Held  Phosphorus  Morning Draw         Signed and Held    Signed and Held  Procalcitonin  Morning Draw         Signed and Held    Signed and Held  Respiratory Panel PCR w/COVID-19(SARS-CoV-2) [facility]RA/VARGHESE/SHANNON/[facility]/XI In-House, NP Swab in UTM/VTM, 2 HR TAT - Swab, Nasopharynx  Once         Signed and Held    Signed and Held  Respiratory Culture - Sputum, Cough  Once         Signed and Held    Signed and Held  Urinalysis With Culture If Indicated - Urine, Clean Catch  Once         Signed and Held    Signed and Held  Potassium Replacement - Follow Nurse / BPA Driven Protocol  As Needed         Signed and Held    Signed and Held  Magnesium Standard Dose Replacement - Follow Nurse / BPA Driven Protocol  As Needed         Signed and Held    Signed and Held  Phosphorus Replacement - Follow Nurse / BPA Driven Protocol  As Needed         Signed and Held    Signed and Held  Calcium Replacement - Follow Nurse / BPA Driven Protocol  As Needed         Signed and Held    Signed and Held  ipratropium-albuterol (DUO-NEB) nebulizer solution 3 mL  Every 4 Hours PRN         Signed and Held    Signed and Held  methylPREDNISolone sodium succinate (SOLU-Medrol) injection 62.5 mg  Daily         Signed and Held    Signed and Held  traMADol (ULTRAM) tablet 25 mg  Every 12 Hours PRN         Signed and Held    Signed and Held  ALPRAZolam (XANAX) tablet 0.25 mg  Every 8 Hours PRN         Signed and Held    Signed and Held  sennosides-docusate (PERICOLACE) 8.6-50 MG per tablet 2 tablet  2 Times Daily PRN        Placed in \"And\" Linked Group    Signed and Held    Signed and Held  polyethylene glycol (MIRALAX) packet 17 g  Daily PRN        Placed in \"And\" Linked Group    Signed and Held    Signed and Held  bisacodyl (DULCOLAX) EC tablet 5 mg  Daily PRN        Placed in \"And\" Linked Group    Signed and Held    Signed and Held  bisacodyl " "(DULCOLAX) suppository 10 mg  Daily PRN        Placed in \"And\" Linked Group    Signed and Held    Signed and Held  multivitamin with minerals 1 tablet  Daily         Signed and Held    Signed and Held  budesonide (PULMICORT) nebulizer solution 0.5 mg  2 Times Daily - RT         Signed and Held    Signed and Held  arformoterol (BROVANA) nebulizer solution 15 mcg  2 Times Daily - RT         Signed and Held    Signed and Held  azithromycin (ZITHROMAX) tablet 250 mg  Every 24 Hours Scheduled         Signed and Held    Signed and Held  RT to Initiate Bronchodilator Protocol  Every 4 Hours PRN - RT         Signed and Held    Signed and Held  RT to Initiate Bronchopulmonary Hygiene Protocol  Every 4 Hours PRN - RT         Signed and Held    Signed and Held  Follow Hypoglycemia Standing Orders For Blood Glucose <70 & Notify Provider of Treatment  As Needed        Comments: Follow Hypoglycemia Orders As Outlined in Process Instructions (Open Order Report to View Full Instructions)  Notify Provider Any Time Hypoglycemia Treatment is Administered    Signed and Held    Signed and Held  dextrose (GLUTOSE) oral gel 15 g  Every 15 Minutes PRN         Signed and Held    Signed and Held  dextrose (D50W) (25 g/50 mL) IV injection 25 g  Every 15 Minutes PRN         Signed and Held    Signed and Held  glucagon (GLUCAGEN) injection 1 mg  Every 15 Minutes PRN         Signed and Held    Signed and Held  POC Glucose 4x Daily Before Meals & at Bedtime  4 Times Daily Before Meals & at Bedtime      Comments: Complete no more than 45 minutes prior to patient eating      Signed and Held    Signed and Held  Insulin Lispro (humaLOG) injection 2-9 Units  4 Times Daily Before Meals & Nightly         Signed and Held    Signed and Held  Discontinue Cardiac Monitoring  Once         Signed and Held    Signed and Held  carvedilol (COREG) tablet 25 mg  2 Times Daily With Meals         Signed and Held    Signed and Held  ergocalciferol capsule 50,000 " Units  Every 14 Days         Signed and Held    Signed and Held  nitroglycerin (NITROSTAT) SL tablet 0.4 mg  Every 5 Minutes PRN         Signed and Held    Signed and Held  rosuvastatin (CRESTOR) tablet 20 mg  Daily         Signed and Held    Signed and Held  sevelamer (RENVELA) tablet 800 mg  3 Times Daily With Meals         Signed and Held    Signed and Held  Inpatient Nephrology Consult  Once        Specialty:  Nephrology  Provider:  Edi García MD    Signed and Held    Signed and Held  hydrALAZINE (APRESOLINE) injection 10 mg  Every 4 Hours PRN         Signed and Held    Signed and Held  hydrALAZINE (APRESOLINE) tablet 25 mg  Every 12 Hours Scheduled         Signed and Held

## 2024-04-22 ENCOUNTER — APPOINTMENT (OUTPATIENT)
Dept: CT IMAGING | Facility: HOSPITAL | Age: 89
DRG: 202 | End: 2024-04-22
Payer: MEDICARE

## 2024-04-22 PROBLEM — N18.6 ESRD (END STAGE RENAL DISEASE): Chronic | Status: ACTIVE | Noted: 2024-04-22

## 2024-04-22 LAB
ANION GAP SERPL CALCULATED.3IONS-SCNC: 14.9 MMOL/L (ref 5–15)
BASOPHILS # BLD AUTO: 0.01 10*3/MM3 (ref 0–0.2)
BASOPHILS NFR BLD AUTO: 0.2 % (ref 0–1.5)
BUN SERPL-MCNC: 55 MG/DL (ref 8–23)
BUN/CREAT SERPL: 13.6 (ref 7–25)
CALCIUM SPEC-SCNC: 7.9 MG/DL (ref 8.6–10.5)
CHLORIDE SERPL-SCNC: 102 MMOL/L (ref 98–107)
CO2 SERPL-SCNC: 22.1 MMOL/L (ref 22–29)
CREAT SERPL-MCNC: 4.05 MG/DL (ref 0.57–1)
DEPRECATED RDW RBC AUTO: 53.2 FL (ref 37–54)
EGFRCR SERPLBLD CKD-EPI 2021: 10.2 ML/MIN/1.73
EOSINOPHIL # BLD AUTO: 0 10*3/MM3 (ref 0–0.4)
EOSINOPHIL NFR BLD AUTO: 0 % (ref 0.3–6.2)
ERYTHROCYTE [DISTWIDTH] IN BLOOD BY AUTOMATED COUNT: 14.7 % (ref 12.3–15.4)
GLUCOSE BLDC GLUCOMTR-MCNC: 137 MG/DL (ref 70–99)
GLUCOSE BLDC GLUCOMTR-MCNC: 160 MG/DL (ref 70–99)
GLUCOSE BLDC GLUCOMTR-MCNC: 201 MG/DL (ref 70–99)
GLUCOSE BLDC GLUCOMTR-MCNC: 216 MG/DL (ref 70–99)
GLUCOSE SERPL-MCNC: 239 MG/DL (ref 65–99)
HCT VFR BLD AUTO: 27.3 % (ref 34–46.6)
HGB BLD-MCNC: 8.3 G/DL (ref 12–15.9)
IMM GRANULOCYTES # BLD AUTO: 0.04 10*3/MM3 (ref 0–0.05)
IMM GRANULOCYTES NFR BLD AUTO: 0.8 % (ref 0–0.5)
LYMPHOCYTES # BLD AUTO: 0.29 10*3/MM3 (ref 0.7–3.1)
LYMPHOCYTES NFR BLD AUTO: 5.5 % (ref 19.6–45.3)
MAGNESIUM SERPL-MCNC: 1.8 MG/DL (ref 1.6–2.4)
MCH RBC QN AUTO: 30.4 PG (ref 26.6–33)
MCHC RBC AUTO-ENTMCNC: 30.4 G/DL (ref 31.5–35.7)
MCV RBC AUTO: 100 FL (ref 79–97)
MONOCYTES # BLD AUTO: 0.06 10*3/MM3 (ref 0.1–0.9)
MONOCYTES NFR BLD AUTO: 1.1 % (ref 5–12)
NEUTROPHILS NFR BLD AUTO: 4.85 10*3/MM3 (ref 1.7–7)
NEUTROPHILS NFR BLD AUTO: 92.4 % (ref 42.7–76)
NRBC BLD AUTO-RTO: 0 /100 WBC (ref 0–0.2)
PHOSPHATE SERPL-MCNC: 4.3 MG/DL (ref 2.5–4.5)
PLATELET # BLD AUTO: 152 10*3/MM3 (ref 140–450)
PMV BLD AUTO: 9.8 FL (ref 6–12)
POTASSIUM SERPL-SCNC: 4.2 MMOL/L (ref 3.5–5.2)
PROCALCITONIN SERPL-MCNC: 0.26 NG/ML (ref 0–0.25)
QT INTERVAL: 436 MS
QTC INTERVAL: 501 MS
RBC # BLD AUTO: 2.73 10*6/MM3 (ref 3.77–5.28)
SODIUM SERPL-SCNC: 139 MMOL/L (ref 136–145)
WBC NRBC COR # BLD AUTO: 5.25 10*3/MM3 (ref 3.4–10.8)

## 2024-04-22 PROCEDURE — 85025 COMPLETE CBC W/AUTO DIFF WBC: CPT | Performed by: INTERNAL MEDICINE

## 2024-04-22 PROCEDURE — 82948 REAGENT STRIP/BLOOD GLUCOSE: CPT

## 2024-04-22 PROCEDURE — 94799 UNLISTED PULMONARY SVC/PX: CPT

## 2024-04-22 PROCEDURE — 80048 BASIC METABOLIC PNL TOTAL CA: CPT | Performed by: INTERNAL MEDICINE

## 2024-04-22 PROCEDURE — 83735 ASSAY OF MAGNESIUM: CPT | Performed by: INTERNAL MEDICINE

## 2024-04-22 PROCEDURE — 71250 CT THORAX DX C-: CPT

## 2024-04-22 PROCEDURE — 5A1D70Z PERFORMANCE OF URINARY FILTRATION, INTERMITTENT, LESS THAN 6 HOURS PER DAY: ICD-10-PCS | Performed by: INTERNAL MEDICINE

## 2024-04-22 PROCEDURE — 99223 1ST HOSP IP/OBS HIGH 75: CPT | Performed by: INTERNAL MEDICINE

## 2024-04-22 PROCEDURE — 25010000002 HEPARIN (PORCINE) PER 1000 UNITS: Performed by: INTERNAL MEDICINE

## 2024-04-22 PROCEDURE — 84145 PROCALCITONIN (PCT): CPT | Performed by: INTERNAL MEDICINE

## 2024-04-22 PROCEDURE — 87205 SMEAR GRAM STAIN: CPT | Performed by: INTERNAL MEDICINE

## 2024-04-22 PROCEDURE — 97165 OT EVAL LOW COMPLEX 30 MIN: CPT

## 2024-04-22 PROCEDURE — 25010000002 METHYLPREDNISOLONE PER 125 MG: Performed by: INTERNAL MEDICINE

## 2024-04-22 PROCEDURE — 84100 ASSAY OF PHOSPHORUS: CPT | Performed by: INTERNAL MEDICINE

## 2024-04-22 PROCEDURE — 82948 REAGENT STRIP/BLOOD GLUCOSE: CPT | Performed by: INTERNAL MEDICINE

## 2024-04-22 PROCEDURE — 63710000001 INSULIN LISPRO (HUMAN) PER 5 UNITS: Performed by: INTERNAL MEDICINE

## 2024-04-22 RX ORDER — HEPARIN SODIUM 1000 [USP'U]/ML
3400 INJECTION, SOLUTION INTRAVENOUS; SUBCUTANEOUS AS NEEDED
Status: DISCONTINUED | OUTPATIENT
Start: 2024-04-22 | End: 2024-04-29 | Stop reason: HOSPADM

## 2024-04-22 RX ORDER — HEPARIN SODIUM 1000 [USP'U]/ML
3400 INJECTION, SOLUTION INTRAVENOUS; SUBCUTANEOUS AS NEEDED
Status: DISCONTINUED | OUTPATIENT
Start: 2024-04-22 | End: 2024-04-22

## 2024-04-22 RX ADMIN — INSULIN LISPRO 4 UNITS: 100 INJECTION, SOLUTION INTRAVENOUS; SUBCUTANEOUS at 08:19

## 2024-04-22 RX ADMIN — Medication 10 ML: at 21:11

## 2024-04-22 RX ADMIN — ROSUVASTATIN CALCIUM 20 MG: 20 TABLET, FILM COATED ORAL at 21:10

## 2024-04-22 RX ADMIN — HEPARIN SODIUM 3400 UNITS: 1000 INJECTION INTRAVENOUS; SUBCUTANEOUS at 12:24

## 2024-04-22 RX ADMIN — INSULIN LISPRO 2 UNITS: 100 INJECTION, SOLUTION INTRAVENOUS; SUBCUTANEOUS at 21:10

## 2024-04-22 RX ADMIN — Medication 1 TABLET: at 08:19

## 2024-04-22 RX ADMIN — BUDESONIDE 0.5 MG: 0.5 SUSPENSION RESPIRATORY (INHALATION) at 18:29

## 2024-04-22 RX ADMIN — AZITHROMYCIN DIHYDRATE 250 MG: 250 TABLET ORAL at 08:19

## 2024-04-22 RX ADMIN — Medication 10 ML: at 08:24

## 2024-04-22 RX ADMIN — TRAMADOL HYDROCHLORIDE 25 MG: 50 TABLET ORAL at 17:12

## 2024-04-22 RX ADMIN — SEVELAMER CARBONATE 800 MG: 800 TABLET, FILM COATED ORAL at 13:17

## 2024-04-22 RX ADMIN — ARFORMOTEROL TARTRATE 15 MCG: 15 SOLUTION RESPIRATORY (INHALATION) at 07:12

## 2024-04-22 RX ADMIN — ARFORMOTEROL TARTRATE 15 MCG: 15 SOLUTION RESPIRATORY (INHALATION) at 18:29

## 2024-04-22 RX ADMIN — BUDESONIDE 0.5 MG: 0.5 SUSPENSION RESPIRATORY (INHALATION) at 07:12

## 2024-04-22 RX ADMIN — FAMOTIDINE 20 MG: 20 TABLET ORAL at 08:19

## 2024-04-22 RX ADMIN — INSULIN LISPRO 4 UNITS: 100 INJECTION, SOLUTION INTRAVENOUS; SUBCUTANEOUS at 17:20

## 2024-04-22 RX ADMIN — METHYLPREDNISOLONE SODIUM SUCCINATE 62.5 MG: 125 INJECTION INTRAMUSCULAR; INTRAVENOUS at 08:19

## 2024-04-22 RX ADMIN — SEVELAMER CARBONATE 800 MG: 800 TABLET, FILM COATED ORAL at 17:20

## 2024-04-22 NOTE — NURSING NOTE
Duration of Treatment 4.0 Hours                                            MD changed order to 3.5 at bedside   Access Site Tunneled Dialysis Catheter   Dialyzer Revaclear    mL/min   Dialysate Temperature (C) 36   BFR-As tolerated to a maximum of: 400 mL/min   Dialysate Solution Bath: K+ 3 mEq, CA 2.5mg   Bicarb 33 mEq   Na+ 137 meq   Fluid Removal: 2L                                                        Removed 1.5L       Pt completed 3.5 hour dialysis treatment; removed 1.5L; pt tolerated well; no complaints from pt, post /62 HR 81, pt stated that she feels like her SOA has resolved some after treatment.     Educated pt on fluid management.     Report given to Ana Saenz, primary RN.

## 2024-04-22 NOTE — CONSULTS
Pulmonary / Critical Care Consult Note      Patient Name: Charlette Rai  : 1935  MRN: [id number]  Primary Care Physician:  Rafael Lyons MD  Referring Physician: Rafael Lyons MD  Date of admission: 2024    Subjective   Subjective     Reason for Consult/ Chief Complaint: COPD, CHF, end-stage renal disease on hemodialysis    HPI:  Charlette Rai is a 89 y.o. female with multiple medical problems including end-stage renal disease, volume overload.  She was admitted with worsening shortness of breath and increased fluid status.  She has had recurrent hospitalizations over the last several weeks for shortness of breath.  She has been getting regular dialysis and had not missed appointments since last hospitalization.  She has been having cough and congestion.  Given her recurrent hospitalization with concern of cough and congestion with COPD, pulmonary service is consulted.  Her previous echocardiogram showed EF of 56% with grade 1 diastolic dysfunction.  Chest x-ray showed right upper lobe atelectasis and left lung volume loss.  She has normal WBC count at 5.25.  She has been on Brovana, Pulmicort and azithromycin.  Has been on Solu-Medrol 62.5 mg IV daily.  Respiratory viral panel was positive for human metapneumovirus.  She has history of smoking in past, quit smoking in .  Prior to that she used to smoke 1 pack a day for more than 50 years.  She is on Symbicort 2 puffs twice daily at home, as needed albuterol inhaler and nebulizer.  She was using albuterol nebulizer several times a day with mild improvement in her breathing over the last several days.  Has no fever or chills.  No nausea or vomiting.  No significant changes in weight or appetite.  No significant leg swelling.  Dialysis does help at times.    Review of Systems  General:  Fatigue, No Fever.   HEENT: No dysphagia, No Visual Changes, no rhinorrhea  Respiratory:  + cough,+Dyspnea, scant mucoid yellow phlegm, No Pleuritic Pain, +  wheezing, no hemoptysis  Cardiovascular: Denies chest pain, denies palpitations,+HAIDER, No Chest Pressure  Gastrointestinal:  No Abdominal Pain, No Nausea, No Vomiting, No Diarrhea  Genitourinary:  No Dysuria, No Frequency, No Hesitancy  Musculoskeletal: No muscle pain or swelling  Endocrine:  No Heat Intolerance, No Cold Intolerance  Hematologic:  No Bleeding, No Bruising  Psychiatric:  No Anxiety, No Depression  Neurologic:  No Confusion, no Dysarthria, No Headaches  Skin:  No Rash, No Open Wounds        Personal History     Past Medical History:   Diagnosis Date    Allergic rhinitis     Anemia     Arthritis     Asthma     USE INHALERS AND NEBULIZERS    Back pain     Bladder disorder     Cancer     LEFT LUNG CANCER 2005 SURGERY AND CHEMO DONE  AND CURRENTLY 4/ 14/22  RIGHT LUNG CANCER HAS ONLY RECEIVED RADIATION THUS FAR    Chronic kidney disease     stage 3    CKD (chronic kidney disease) stage 4, GFR 15-29 ml/min     Condition not found     ulcer    Congestive heart failure (CHF)     FOLLOWED BY DR AQUINO. DENIES CP BUT DOES HAVE SOA CHRONIC ISSUE COPD/LUNG CANCER    COPD (chronic obstructive pulmonary disease)     FOLLOWED BY DR MARIAJOSE BAILEY    Coronary artery disease     DENIES CP BUT DOES GET SOA MOST OF THE TIME WITH EXERTION BUT OCC AT REST CHRONIC ISSUE COPD/LUNG CANCER    Deep vein thrombosis     Diabetes mellitus     DOES NOT CHECK BS DAILY    Disease of thyroid gland     HYPOTHYROIDISM    Essential hypertension     Gastric ulcer     GERD (gastroesophageal reflux disease)     Heart murmur     History of transfusion     NO ISSUES POST TRANSFUSION WAS MANY YEARS AGO    Hyperlipemia     Leukocytopenia     FOLLOWED BY DR MIR BAILEY    Limb swelling     Lumbago     Lumbar spinal stenosis     Lung cancer     Migraine headache     Multiple joint pain     On home oxygen therapy     3L/NC PRN    Osteopenia     Reflux esophagitis     Shortness of breath     Thyroid nodule     HAS ULTRASOUND YEARLY BEING MONITORED     Vascular disease     Vitamin D deficiency        Past Surgical History:   Procedure Laterality Date    ABDOMINAL SURGERY      APPENDECTOMY      ARTERIOVENOUS FISTULA/SHUNT SURGERY Left 05/10/2022    Procedure: Left basilic vein transposition;  Surgeon: Wan Barksdale MD;  Location: [facility] MAIN OR;  Service: Vascular;  Laterality: Left;    ARTERIOVENOUS FISTULA/SHUNT SURGERY Left 3/23/2023    Procedure: Ligation of left arm arteriovenous fistula;  Surgeon: Wan Barksdale MD;  Location: [facility] MAIN OR;  Service: Vascular;  Laterality: Left;    ARTERIOVENOUS FISTULA/SHUNT SURGERY Left 11/30/2023    Procedure: Creation of left arm arteriovenous graft;  Surgeon: Wan Barksdale MD;  Location: [facility] MAIN OR;  Service: Vascular;  Laterality: Left;    CARDIAC CATHETERIZATION  1996    CARDIAC SURGERY      CARDIAC SURGERY      fluid drained from heart    CATARACT EXTRACTION, BILATERAL  2003    COLONOSCOPY  2014    ENDOSCOPY  2016 2019    FEMORAL ARTERY STENT Bilateral     HYSTERECTOMY      LUNG BIOPSY Left 2005    lobectomy upper lung caner    LUNG VOLUME REDUCTION      OTHER SURGICAL HISTORY      artifical joints/limbs    REPLACEMENT TOTAL KNEE Left 2016    UPPER GASTROINTESTINAL ENDOSCOPY         Family History: family history includes Arthritis in her father and mother; Cancer in her brother, brother, brother, and brother; Diabetes in her brother; Other in her brother; Prostate cancer in her brother and brother.     Social History:  reports that she quit smoking about 19 years ago. Her smoking use included cigarettes. She started smoking about 72 years ago. She has a 52.5 pack-year smoking history. She has been exposed to tobacco smoke. She has never used smokeless tobacco. She reports that she does not drink alcohol and does not use drugs.    Home Medications:  Dulaglutide, albuterol sulfate HFA, budesonide-formoterol, carvedilol, dexlansoprazole, doxycycline, ergocalciferol, hydrALAZINE, levocetirizine,  linagliptin, nitroglycerin, polycarbophil, rosuvastatin, sevelamer, and traMADol    Allergies:  No Known Allergies    Objective    Objective     Vitals:   Temp:  [97.2 °F (36.2 °C)-98.4 °F (36.9 °C)] 97.8 °F (36.6 °C)  Heart Rate:  [74-92] 80  Resp:  [12-20] 18  BP: (144-178)/(39-71) 154/50  Flow (L/min):  [2-3] 2    Physical Exam:  Vital Signs Reviewed   General:  WDWN, Alert, mild distress, has conversational dyspnea.    HEENT:  PERRL, EOMI.  OP, nares clear, no sinus tenderness  Neck:  Supple, no JVD, no thyromegaly  Lymph: no axillary, cervical, supraclavicular lymphadenopathy noted bilaterally  Chest: Poor aeration, minimal scattered rhonchi, no wheezing, bibasilar crackles, resonant to percussion bilaterally, TDC in place  CV: RRR, no MGR, pulses 2+, equal.  Abd:  Soft, NT, ND, + BS, no HSM  EXT:  no clubbing, no cyanosis, trace lower extremity edema, no joint tenderness  Neuro:  A&Ox3, CN grossly intact, no focal deficits.  Skin: No rashes or lesions noted      Result Review    Result Review:  I have personally reviewed the results from the time of this admission to 4/22/2024 11:08 EDT and agree with these findings:  [x]  Laboratory  [x]  Microbiology  [x]  Radiology  [x]  EKG/Telemetry   [x]  Cardiology/Vascular   []  Pathology  []  Old records  []  Other:  Most notable findings include:         Lab 04/22/24  0518 04/21/24  1350 04/17/24  1940 04/16/24  0421   WBC 5.25 7.66 9.84 7.01   HEMOGLOBIN 8.3* 8.5* 9.9* 8.2*   HEMATOCRIT 27.3* 28.2* 33.0* 27.1*   PLATELETS 152 150 112* 103*   SODIUM 139 138 136 135*   POTASSIUM 4.2 4.2 4.5 4.2   CHLORIDE 102 100 98 98   CO2 22.1 25.3 25.6 28.6   BUN 55* 48* 16 22   CREATININE 4.05* 4.21* 1.86* 1.75*   GLUCOSE 239* 148* 135* 147*   CALCIUM 7.9* 7.9* 8.7 7.9*   PHOSPHORUS 4.3  --   --   --    TOTAL PROTEIN  --  5.8* 6.5 5.6*   ALBUMIN  --  3.3* 3.9 3.3*   GLOBULIN  --  2.5 2.6 2.3     XR Chest 1 View    Result Date: 4/21/2024  XR CHEST 1 VW-  Date of Exam: 4/21/2024  1:43 PM  Indication: SOA Triage Protocol  Comparison: 4/17/2024  Findings: Postsurgical changes in the left hemithorax with shift of mediastinal structures from right to left appear stable.  Linear opacity in the right upper lobe is stable.  Right jugular central venous catheter is in unchanged position, the tip is in the mid SVC.  Bony structures appear intact.      Impression: Impression: No significant change in comparison to 4/17/2024, as above.   Electronically Signed By-SAVI LUCERO MD On:4/21/2024 1:48 PM       Results for orders placed during the hospital encounter of 10/29/23    Adult Transthoracic Echo Complete W/ Cont if Necessary Per Protocol    Interpretation Summary    Left ventricular systolic function is normal. Calculated left ventricular EF = 56.8%    Left ventricular wall thickness is consistent with mild asymmetric hypertrophy.    Left ventricular diastolic function is consistent with (grade I) impaired relaxation.    Left atrial volume is moderately increased.    Moderate aortic valve stenosis is present.    Estimated right ventricular systolic pressure from tricuspid regurgitation is normal (<35 mmHg).    Mild dilation of the aortic root is present.        Assessment & Plan   Assessment / Plan     Active Hospital Problems:  Active Hospital Problems    Diagnosis     ESRD (end stage renal disease)     COPD exacerbation     Anemia of chronic disease     Essential hypertension     Acute exacerbation of CHF (congestive heart failure)     Chronic obstructive pulmonary disease with acute exacerbation          Impression:  Diastolic heart failure with acute exacerbation  COPD with acute exacerbation  End-stage renal disease on dialysis  History of smoking in past  Persistent cough  Viral bronchitis with human metapneumovirus      Plan:  Check sputum culture.  Respiratory viral panel was positive for human metapneumovirus.  Continue with Brovana and Pulmicort nebulizers twice daily.  Was on Symbicort  at home.  Will likely need scheduled DuoNeb or Spiriva at home.  Start DuoNeb nebulizer 4 times a day  Check CT scan of the chest.  Continue dialysis per nephrology service.  Cardiac meds including Coreg, hydralazine per primary service and cardiology service.  Continue with azithromycin to 50 mg once daily.    I have reviewed labs, imaging, pertinent clinical data and provider notes.   I have discussed with bedside nurse and primary service.       Electronically signed by Khalif Yanez MD, 4/22/2024, 11:08 EDT.

## 2024-04-22 NOTE — PLAN OF CARE
Goal Outcome Evaluation:  Plan of Care Reviewed With: patient           Outcome Evaluation: Patient AAOx4. VSS. No BP or stick on left arm. Patient on 2L N/C with 96% O2sat throughout. Pt resting well without C/O pain or discomfort. Blood sugar 173 mg/dL , 2unit insulin  given for coverage this shift.  Continue plan of care.

## 2024-04-22 NOTE — CONSULTS
[facility]   Nephrology Consult Note      Patient Name: Charlette Rai  : 1935  MRN: [id number]  Primary Care Physician:  Rafael Lyons MD  Referring Physician: Rafael Lyons MD  Date of admission: 2024    Subjective   Subjective     Reason for Consult/ Chief Complaint: End-stage renal disease    HPI:  Charlette Rai is a 89 y.o. female with multiple medical problems including end-stage renal disease who was discharged last Thursday and returned yesterday with worsening shortness of breath.  She stated that last week while inpatient received dialysis on  and then again on Friday as outpatient.  Saturday she started getting short of breath and had to come to the ER.  I saw her today while receiving dialysis.  She is feeling little bit better.  Last week during hospitalization, she required IV fluid after dialysis to restore her blood pressure.  No nausea or vomiting but she is generally weak and frail.    Review of Systems   All systems were reviewed and negative except for: What is mentioned above.    Personal History     Past Medical History:   Diagnosis Date    Allergic rhinitis     Anemia     Arthritis     Asthma     USE INHALERS AND NEBULIZERS    Back pain     Bladder disorder     Cancer     LEFT LUNG CANCER  SURGERY AND CHEMO DONE  AND CURRENTLY   RIGHT LUNG CANCER HAS ONLY RECEIVED RADIATION THUS FAR    Chronic kidney disease     stage 3    CKD (chronic kidney disease) stage 4, GFR 15-29 ml/min     Condition not found     ulcer    Congestive heart failure (CHF)     FOLLOWED BY DR AQUINO. DENIES CP BUT DOES HAVE SOA CHRONIC ISSUE COPD/LUNG CANCER    COPD (chronic obstructive pulmonary disease)     FOLLOWED BY DR MARIAJOSE BAILEY    Coronary artery disease     DENIES CP BUT DOES GET SOA MOST OF THE TIME WITH EXERTION BUT OCC AT REST CHRONIC ISSUE COPD/LUNG CANCER    Deep vein thrombosis     Diabetes mellitus     DOES NOT CHECK BS DAILY    Disease of thyroid gland      HYPOTHYROIDISM    Essential hypertension     Gastric ulcer     GERD (gastroesophageal reflux disease)     Heart murmur     History of transfusion     NO ISSUES POST TRANSFUSION WAS MANY YEARS AGO    Hyperlipemia     Leukocytopenia     FOLLOWED BY DR MIR BAILEY    Limb swelling     Lumbago     Lumbar spinal stenosis     Lung cancer     Migraine headache     Multiple joint pain     On home oxygen therapy     3L/NC PRN    Osteopenia     Reflux esophagitis     Shortness of breath     Thyroid nodule     HAS ULTRASOUND YEARLY BEING MONITORED    Vascular disease     Vitamin D deficiency        Past Surgical History:   Procedure Laterality Date    ABDOMINAL SURGERY      APPENDECTOMY      ARTERIOVENOUS FISTULA/SHUNT SURGERY Left 05/10/2022    Procedure: Left basilic vein transposition;  Surgeon: Wan Barksdale MD;  Location: [facility] MAIN OR;  Service: Vascular;  Laterality: Left;    ARTERIOVENOUS FISTULA/SHUNT SURGERY Left 3/23/2023    Procedure: Ligation of left arm arteriovenous fistula;  Surgeon: Wan Barksdale MD;  Location: [facility] MAIN OR;  Service: Vascular;  Laterality: Left;    ARTERIOVENOUS FISTULA/SHUNT SURGERY Left 11/30/2023    Procedure: Creation of left arm arteriovenous graft;  Surgeon: Wan Barksdale MD;  Location: [facility] MAIN OR;  Service: Vascular;  Laterality: Left;    CARDIAC CATHETERIZATION  1996    CARDIAC SURGERY      CARDIAC SURGERY      fluid drained from heart    CATARACT EXTRACTION, BILATERAL  2003    COLONOSCOPY  2014    ENDOSCOPY  2016 2019    FEMORAL ARTERY STENT Bilateral     HYSTERECTOMY      LUNG BIOPSY Left 2005    lobectomy upper lung caner    LUNG VOLUME REDUCTION      OTHER SURGICAL HISTORY      artifical joints/limbs    REPLACEMENT TOTAL KNEE Left 2016    UPPER GASTROINTESTINAL ENDOSCOPY         Family History: family history includes Arthritis in her father and mother; Cancer in her brother, brother, brother, and brother; Diabetes in her brother; Other in her brother; Prostate  cancer in her brother and brother. Otherwise pertinent FHx was reviewed and not pertinent to current issue.    Social History:  reports that she quit smoking about 19 years ago. Her smoking use included cigarettes. She started smoking about 72 years ago. She has a 52.5 pack-year smoking history. She has been exposed to tobacco smoke. She has never used smokeless tobacco. She reports that she does not drink alcohol and does not use drugs.    Home Medications:  Dulaglutide, albuterol sulfate HFA, budesonide-formoterol, carvedilol, dexlansoprazole, doxycycline, ergocalciferol, hydrALAZINE, levocetirizine, linagliptin, nitroglycerin, polycarbophil, rosuvastatin, sevelamer, and traMADol    Allergies:  No Known Allergies    Objective    Objective     Vitals:   Temp:  [97.2 °F (36.2 °C)-97.9 °F (36.6 °C)] 97.9 °F (36.6 °C)  Heart Rate:  [74-92] 85  Resp:  [16-20] 18  BP: (149-178)/(48-71) 172/51  Flow (L/min):  [2-3] 2     Physical Exam    Constitutional: Awake, alert, no distress,   Eyes: sclerae anicteric, no conjunctival injection   HENT: mucous membranes moist   Neck: Supple, no thyromegaly, no lymphadenopathy, trachea midline, mild JVD   Respiratory: Clear to auscultation bilaterally, nonlabored respirations    Cardiovascular: RRR, no murmurs, rubs, or gallops.   Gastrointestinal: Positive bowel sounds, soft, nontender, nondistended   Musculoskeletal: No edema, no clubbing or cyanosis   Psychiatric: Appropriate affect, cooperative   Neurologic: Oriented x 3, moving all extremities, Cranial Nerves grossly intact, speech clear   Skin: warm and dry, no rashes    Patent left upper extremity AV graft and using right IJ TDC.  Site is clean and covered.    Result Review    Result Reviewed:  I have personally reviewed the results from the time of this admission to 4/22/2024 19:23 EDT and agree with these findings:  [x]  Laboratory  []  Microbiology  [x]  Radiology  []  EKG/Telemetry   []  Cardiology/Vascular   []   Pathology  [x]  Old records  []  Other:  LAB RESULTS:    LAB RESULTS:        Lab 04/22/24  0518 04/21/24  1350 04/17/24  1940 04/16/24  0421   SODIUM 139 138 136 135*   POTASSIUM 4.2 4.2 4.5 4.2   CHLORIDE 102 100 98 98   CO2 22.1 25.3 25.6 28.6   BUN 55* 48* 16 22   CREATININE 4.05* 4.21* 1.86* 1.75*   GLUCOSE 239* 148* 135* 147*   EGFR 10.2* 9.7* 25.9* 27.9*   ANION GAP 14.9 12.7 12.4 8.4   MAGNESIUM 1.8  --   --   --    PHOSPHORUS 4.3  --   --   --            Most notable findings include: Potassium 4.2, hemoglobin 8.4.    Assessment & Plan   Assessment / Plan     Brief Patient Summary:  Charlette Rai is a 89 y.o. female who is admitted with worsening shortness of breath/recurrent episodes of similar presentation.    Active Hospital Problems:  Active Hospital Problems    Diagnosis     ESRD (end stage renal disease)     COPD exacerbation     Anemia of chronic disease     Essential hypertension     Acute exacerbation of CHF (congestive heart failure)     Chronic obstructive pulmonary disease with acute exacerbation        Assessment and Plan:   -ESRD, dialysis 3 times a week, continue current schedule.  Ultrafiltration 1.5 kg today.  Volume status and electrolytes seem to be adequate.  - Shortness of breath, recurrent, etiology is uncertain.  I doubt related to volume overload.  Pulmonary and cardiology will be consulted.  - Clotted access, status post recent declot.  Currently using right IJ TDC.  - Anemia of chronic kidney disease, receiving long-acting CASSANDRA as outpatient.  - Diastolic dysfunction with LVH and moderate aortic stenosis.  Cardiology evaluating.  - Left renal lesion with recent spontaneous right perinephric subcapsular hematoma, needs urology follow-up as outpatient.  - History of hypertension  - Type 2 diabetes with complications.  - History of bilateral renal artery stenosis.  If no other clear etiology is found for shortness of breath, One wonders if we are dealing with recurrent flash  pulmonary edema related to renal artery stenosis.  - History of lung cancer and COPD.  Discussed with primary and nursing dialysis staff.  Will follow.    Thank you very much for this consult!    Electronically signed by Edi García MD, 4/22/2024, 19:23 EDT.

## 2024-04-22 NOTE — OUTREACH NOTE
Medical Week 1 Survey      Flowsheet Row Responses   [facility] patient discharged from? [facility]   Does the patient have one of the following disease processes/diagnoses(primary or secondary)? Other   Week 1 attempt successful? No   Unsuccessful attempts Attempt 1   Revoke Readmitted            LITO HART - Registered Nurse

## 2024-04-22 NOTE — THERAPY EVALUATION
Patient Name: Charlette Rai  : 1935    MRN: [id number]                              Today's Date: 2024       Admit Date: 2024    Visit Dx:     ICD-10-CM ICD-9-CM   1. Acute exacerbation of chronic obstructive pulmonary disease (COPD)  J44.1 491.21     Patient Active Problem List   Diagnosis    Malignant neoplasm of upper lobe of right lung    Allergic rhinitis    Rheumatoid arthritis    Asthma    Chronic obstructive pulmonary disease with acute exacerbation    Essential hypertension    GERD (gastroesophageal reflux disease)    Hyperlipidemia LDL goal <70    Lumbar spinal stenosis    Migraine    Monoclonal paraproteinemia    Osteopenia    Oxygen dependent    Peripheral neuropathy    Stage 4 chronic kidney disease    Type 2 diabetes mellitus    Vitamin D deficiency    Carotid artery stenosis    Chronic right-sided thoracic back pain    Acute pain of left shoulder    Peripheral vascular disease of lower extremity    Edema    Ex-smoker    Peripheral vascular disease    De Quervain's tenosynovitis    Arthritis of carpometacarpal (CMC) joint of right thumb    Arthritis of right hand    Steal syndrome of dialysis vascular access    Anemia of chronic renal failure, stage 4 (severe)    Anemia of chronic disease    Aortic stenosis, moderate    Hematoma    ESRD (end stage renal disease)    CHF (congestive heart failure)    ESRD (end stage renal disease)    Volume overload    COPD with acute exacerbation    COPD exacerbation     Past Medical History:   Diagnosis Date    Allergic rhinitis     Anemia     Arthritis     Asthma     USE INHALERS AND NEBULIZERS    Back pain     Bladder disorder     Cancer     LEFT LUNG CANCER  SURGERY AND CHEMO DONE  AND CURRENTLY   RIGHT LUNG CANCER HAS ONLY RECEIVED RADIATION THUS FAR    Chronic kidney disease     stage 3    CKD (chronic kidney disease) stage 4, GFR 15-29 ml/min     Condition not found     ulcer    Congestive heart failure (CHF)     FOLLOWED BY   AQUINO. DENIES CP BUT DOES HAVE SOA CHRONIC ISSUE COPD/LUNG CANCER    COPD (chronic obstructive pulmonary disease)     FOLLOWED BY DR MARIAJOSE BAILEY    Coronary artery disease     DENIES CP BUT DOES GET SOA MOST OF THE TIME WITH EXERTION BUT OCC AT REST CHRONIC ISSUE COPD/LUNG CANCER    Deep vein thrombosis     Diabetes mellitus     DOES NOT CHECK BS DAILY    Disease of thyroid gland     HYPOTHYROIDISM    Essential hypertension     Gastric ulcer     GERD (gastroesophageal reflux disease)     Heart murmur     History of transfusion     NO ISSUES POST TRANSFUSION WAS MANY YEARS AGO    Hyperlipemia     Leukocytopenia     FOLLOWED BY DR MIR BAILEY    Limb swelling     Lumbago     Lumbar spinal stenosis     Lung cancer     Migraine headache     Multiple joint pain     On home oxygen therapy     3L/NC PRN    Osteopenia     Reflux esophagitis     Shortness of breath     Thyroid nodule     HAS ULTRASOUND YEARLY BEING MONITORED    Vascular disease     Vitamin D deficiency      Past Surgical History:   Procedure Laterality Date    ABDOMINAL SURGERY      APPENDECTOMY      ARTERIOVENOUS FISTULA/SHUNT SURGERY Left 05/10/2022    Procedure: Left basilic vein transposition;  Surgeon: Wan Barksdale MD;  [facility]  Service: Vascular;  Laterality: Left;    ARTERIOVENOUS FISTULA/SHUNT SURGERY Left 3/23/2023    Procedure: Ligation of left arm arteriovenous fistula;  Surgeon: Wan Barksdale MD;  Location: [facility] OR;  Service: Vascular;  Laterality: Left;    ARTERIOVENOUS FISTULA/SHUNT SURGERY Left 11/30/2023    Procedure: Creation of left arm arteriovenous graft;  Surgeon: Wan Barksdale MD;  Location: [facility] OR;  Service: Vascular;  Laterality: Left;    CARDIAC CATHETERIZATION  1996    CARDIAC SURGERY      CARDIAC SURGERY      fluid drained from heart    CATARACT EXTRACTION, BILATERAL  2003    COLONOSCOPY  2014    ENDOSCOPY  2016    2019    FEMORAL ARTERY STENT Bilateral     HYSTERECTOMY      LUNG BIOPSY Left 2005     lobectomy upper lung caner    LUNG VOLUME REDUCTION      OTHER SURGICAL HISTORY      artifical joints/limbs    REPLACEMENT TOTAL KNEE Left 2016    UPPER GASTROINTESTINAL ENDOSCOPY        General Information       Row Name 04/22/24 0853          OT Time and Intention    Document Type evaluation  -PG     Mode of Treatment individual therapy;occupational therapy  -PG       Row Name 04/22/24 0853          General Information    Patient Profile Reviewed yes  Lives with spouse.  Reports independence with all ADLs and functional transfers using rolling walker.  Oxygen 2 L at home, daughter assists as needed  -PG     Prior Level of Function independent:;transfer;ADL's  -PG     Existing Precautions/Restrictions fall;oxygen therapy device and L/min  -PG     Barriers to Rehab none identified  -PG       Row Name 04/22/24 0853          Occupational Profile    Reason for Services/Referral (Occupational Profile) Patient is a pleasant 89-year-old female admitted for COPD exacerbation.  Patient is being seen by Occupational Therapy due to recent decline in ADL function.  No previous OT services identified  -PG       Row Name 04/22/24 0853          Living Environment    People in Home spouse  -PG       Row Name 04/22/24 0853          Cognition    Orientation Status (Cognition) oriented x 3  -PG       Row Name 04/22/24 0853          Safety Issues, Functional Mobility    Impairments Affecting Function (Mobility) balance;endurance/activity tolerance;shortness of breath;strength  -PG               User Key  (r) = Recorded By, (t) = Taken By, (c) = Cosigned By      Initials Name Provider Type    PG Laron Mosqueda, MARCELLE Occupational Therapist                     Mobility/ADL's       Row Name 04/22/24 0856          Transfers    Transfers bed-chair transfer  -PG       Row Name 04/22/24 0856          Bed-Chair Transfer    Bed-Chair [place] (Transfers) minimum assist (75% patient effort);contact guard;verbal cues  -PG     Assistive Device  (Bed-Chair Transfers) walker, front-wheeled  -PG       Row Name 04/22/24 0856          Sit-Stand Transfer    Sit-Stand [place] (Transfers) contact guard;minimum assist (75% patient effort)  -     Assistive Device (Sit-Stand Transfers) walker, front-wheeled  -PG       Row Name 04/22/24 0856          Activities of Daily Living    BADL Assessment/Intervention bathing;upper body dressing;lower body dressing;grooming;toileting  -       Row Name 04/22/24 0856          Bathing Assessment/Intervention    [place] Level (Bathing) bathing skills;minimum assist (75% patient effort)  -PG       Row Name 04/22/24 0856          Upper Body Dressing Assessment/Training    [place] Level (Upper Body Dressing) upper body dressing skills;set up  -       Row Name 04/22/24 0856          Lower Body Dressing Assessment/Training    [place] Level (Lower Body Dressing) lower body dressing skills;maximum assist (25% patient effort)  -       Row Name 04/22/24 0856          Grooming Assessment/Training    [place] Level (Grooming) grooming skills;set up  -[facility] Name 04/22/24 0856          Toileting Assessment/Training    [place] Level (Toileting) toileting skills;minimum assist (75% patient effort)  -               User Key  (r) = Recorded By, (t) = Taken By, (c) = Cosigned By      Initials Name Provider Type    PG Laron Mosqueda OT Occupational Therapist                   Obj/Interventions       [facility] Name 04/22/24 0857          Sensory Assessment (Somatosensory)    Sensory Assessment (Somatosensory) sensation intact  -[facility] Name 04/22/24 0857          Vision Assessment/Intervention    Visual Impairment/Limitations corrective lenses full-time  -[facility] Name 04/22/24 0857          Range of Motion Comprehensive    General Range of Motion no range of motion deficits identified  -[facility] Name 04/22/24 0857          Strength Comprehensive (MMT)    Comment, General Manual Muscle Testing (MMT)  Assessment 4 - to 4/5 BUE  -PG       Row Name 04/22/24 0857          Motor Skills    Motor Skills coordination;functional endurance  -PG     Coordination WFL  -PG     Functional Endurance Poor plus  -PG               User Key  (r) = Recorded By, (t) = Taken By, (c) = Cosigned By      Initials Name Provider Type    PG Laron Mosqueda, OT Occupational Therapist                   Goals/Plan       Row Name 04/22/24 0900          Transfer Goal 1 (OT)    Activity/Assistive Device (Transfer Goal 1, OT) transfers, all  -PG     [place] Level/Cues Needed (Transfer Goal 1, OT) modified independence  -PG     Time Frame (Transfer Goal 1, OT) long term goal (LTG);10 days  -PG       Row Name 04/22/24 0900          Bathing Goal 1 (OT)    Activity/Device (Bathing Goal 1, OT) bathing skills, all  -PG     [place] Level/Cues Needed (Bathing Goal 1, OT) modified independence  -PG     Time Frame (Bathing Goal 1, OT) long term goal (LTG);10 days  -PG       Row Name 04/22/24 0900          Dressing Goal 1 (OT)    Activity/Device (Dressing Goal 1, OT) dressing skills, all  -PG     [place]/Cues Needed (Dressing Goal 1, OT) modified independence  -PG     Time Frame (Dressing Goal 1, OT) long term goal (LTG);10 days  -PG       Row Name 04/22/24 0900          Toileting Goal 1 (OT)    Activity/Device (Toileting Goal 1, OT) toileting skills, all  -PG     [place] Level/Cues Needed (Toileting Goal 1, OT) modified independence  -PG     Time Frame (Toileting Goal 1, OT) long term goal (LTG);10 days  -PG       Row Name 04/22/24 0900          Grooming Goal 1 (OT)    Activity/Device (Grooming Goal 1, OT) grooming skills, all  -PG     [place] (Grooming Goal 1, OT) modified independence  -PG     Time Frame (Grooming Goal 1, OT) long term goal (LTG);10 days  -PG       Row Name 04/22/24 0900          Strength Goal 1 (OT)    Strength Goal 1 (OT) Patient will improve bilateral upper extremity strength to 4+/5 to support independence with  ADL activities  -PG     Time Frame (Strength Goal 1, OT) long term goal (LTG);10 days  -PG       Row Name 04/22/24 0900          Problem Specific Goal 1 (OT)    Problem Specific Goal 1 (OT) Patient will improve activity tolerance to fair plus to support independence with self-care tasks  -PG     Time Frame (Problem Specific Goal 1, OT) long term goal (LTG);10 days  -PG       Row Name 04/22/24 0900          Therapy Assessment/Plan (OT)    Planned Therapy Interventions (OT) activity tolerance training;BADL retraining;strengthening exercise;transfer/mobility retraining;patient/caregiver education/training;occupation/activity based interventions  -PG               User Key  (r) = Recorded By, (t) = Taken By, (c) = Cosigned By      Initials Name Provider Type    PG Laron Mosqueda, MARCELLE Occupational Therapist                   Clinical Impression       Row Name 04/22/24 0858          Pain Assessment    Pretreatment Pain Rating 0/10 - no pain  -PG     Posttreatment Pain Rating 0/10 - no pain  -PG       Row Name 04/22/24 0858          Plan of Care Review    Plan of Care Reviewed With patient  -PG     Progress no change  -PG     Outcome Evaluation Patient presents with limitations affecting strength, activity tolerance, and balance impacting patient's ability to return home safely and independently.  The skills of a therapist will be required to safely and effectively implement the following treatment plan to restore maximal level of function  -PG       Row Name 04/22/24 0858          Therapy Assessment/Plan (OT)    Patient/Family Therapy Goal Statement (OT) Stronger and return home independently  -PG     Rehab Potential (OT) good, to achieve stated therapy goals  -PG     Criteria for Skilled Therapeutic Interventions Met (OT) yes;meets criteria;skilled treatment is necessary  -PG     Therapy Frequency (OT) 5 times/wk  -PG       Row Name 04/22/24 0858          Therapy Plan Review/Discharge Plan (OT)    Anticipated Discharge  Disposition (OT) skilled nursing facility;inpatient rehabilitation facility;sub acute care setting  Due to recent previous admissions, patient may benefit from some type of inpatient rehab before returning home  -PG               User Key  (r) = Recorded By, (t) = Taken By, (c) = Cosigned By      Initials Name Provider Type    PG Laron Mosqueda OT Occupational Therapist                   Outcome Measures       Row Name 04/22/24 0902          How much help from another is currently needed...    Putting on and taking off regular lower body clothing? 2  -PG     Bathing (including washing, rinsing, and drying) 2  -PG     Toileting (which includes using toilet bed pan or urinal) 2  -PG     Putting on and taking off regular upper body clothing 3  -PG     Taking care of personal grooming (such as brushing teeth) 3  -PG     Eating meals 4  -PG     AM-PAC 6 Clicks Score (OT) 16  -PG       Row Name 04/21/24 2228          How much help from another person do you currently need...    Turning from your back to your side while in flat bed without using bedrails? 4  -AH     Moving from lying on back to sitting on the side of a flat bed without bedrails? 4  -AH     Moving to and from a bed to a chair (including a wheelchair)? 3  -AH     Standing up from a chair using your arms (e.g., wheelchair, bedside chair)? 3  -AH     Climbing 3-5 steps with a railing? 3  -AH     To walk in hospital room? 2  -AH     AM-PAC 6 Clicks Score (PT) 19  -AH     Highest Level of Mobility Goal 6 --> Walk 10 steps or more  -AH       Row Name 04/22/24 0902          Functional Assessment    Outcome Measure Options AM-PAC 6 Clicks Daily Activity (OT);Optimal Instrument  -PG       Row Name 04/22/24 0902          Optimal Instrument    Optimal Instrument Optimal - 3  -PG     Bending/Stooping 3  -PG     Standing 2  -PG     Reaching 1  -PG     From the list, choose the 3 activities you would most like to be able to do without any difficulty  Bending/stooping;Standing;Reaching  -PG     Total Score Optimal - 3 6  -PG               User Key  (r) = Recorded By, (t) = Taken By, (c) = Cosigned By      Initials Name Provider Type    PG Laron Mosqueda OT Occupational Therapist    Mirna Coello RN Registered Nurse                    Occupational Therapy Education       Title: PT OT SLP Therapies (Done)       Topic: Occupational Therapy (Done)       Point: ADL training (Done)       Description:   Instruct learner(s) on proper safety adaptation and remediation techniques during self care or transfers.   Instruct in proper use of assistive devices.                  Learning Progress Summary             Patient Acceptance, E,D, DU by PG at 4/22/2024 0902                         Point: Home exercise program (Done)       Description:   Instruct learner(s) on appropriate technique for monitoring, assisting and/or progressing therapeutic exercises/activities.                  Learning Progress Summary             Patient Acceptance, E,D, DU by PG at 4/22/2024 0902                         Point: Precautions (Done)       Description:   Instruct learner(s) on prescribed precautions during self-care and functional transfers.                  Learning Progress Summary             Patient Acceptance, E,D, DU by PG at 4/22/2024 0902                         Point: Body mechanics (Done)       Description:   Instruct learner(s) on proper positioning and spine alignment during self-care, functional mobility activities and/or exercises.                  Learning Progress Summary             Patient Acceptance, E,D, DU by PG at 4/22/2024 0902                                         User Key       Initials Effective Dates Name Provider Type [facility] 06/16/21 -  Laron Mosqueda OT Occupational Therapist OT                  OT Recommendation and Plan  Planned Therapy Interventions (OT): activity tolerance training, BADL retraining, strengthening exercise,  transfer/mobility retraining, patient/caregiver education/training, occupation/activity based interventions  Therapy Frequency (OT): 5 times/wk  Plan of Care Review  Plan of Care Reviewed With: patient  Progress: no change  Outcome Evaluation: Patient presents with limitations affecting strength, activity tolerance, and balance impacting patient's ability to return home safely and independently.  The skills of a therapist will be required to safely and effectively implement the following treatment plan to restore maximal level of function     Time Calculation:   Evaluation Complexity (OT)  Review Occupational Profile/Medical/Therapy History Complexity: brief/low complexity  Assessment, Occupational Performance/Identification of Deficit Complexity: 3-5 performance deficits  Clinical Decision Making Complexity (OT): problem focused assessment/low complexity  Overall Complexity of Evaluation (OT): low complexity     Time Calculation- OT       Row Name 04/22/24 0904             Time Calculation- OT    OT Received On 04/22/24  -PG      OT Goal Re-Cert Due Date 05/01/24  -PG         Untimed Charges    OT Eval/Re-eval Minutes 35  -PG         Total Minutes    Untimed Charges Total Minutes 35  -PG       Total Minutes 35  -PG                User Key  (r) = Recorded By, (t) = Taken By, (c) = Cosigned By      Initials Name Provider Type    PG Laron Mosqueda OT Occupational Therapist                  Therapy Charges for Today       Code Description Service Date Service Provider Modifiers Qty    [id number] HC OT EVAL LOW COMPLEXITY 3 4/22/2024 Laron Mosqueda OT GO 1                 Laron Mosqueda OT  4/22/2024

## 2024-04-22 NOTE — CONSULTS
Patient not in the room at this time. Will check back at a later time to complete diabetes education.

## 2024-04-22 NOTE — PLAN OF CARE
Goal Outcome Evaluation:  Plan of Care Reviewed With: patient        Progress: no change  Outcome Evaluation: Patient presents with limitations affecting strength, activity tolerance, and balance impacting patient's ability to return home safely and independently.  The skills of a therapist will be required to safely and effectively implement the following treatment plan to restore maximal level of function      Anticipated Discharge Disposition (OT): skilled nursing facility, inpatient rehabilitation facility, sub acute care setting (Due to recent previous admissions, patient may benefit from some type of inpatient rehab before returning home)

## 2024-04-22 NOTE — PLAN OF CARE
Goal Outcome Evaluation:  Plan of Care Reviewed With: patient   Dialysis done today, 1.5L taken off. Pt c/o pain to back and abdomen 8/10, PRN tramadol administered. Supplemental insulin given at breakfast and dinner. Sputum sample rejected by lab, new sputum sample to be obtained. Continuing with plan of care.

## 2024-04-22 NOTE — SIGNIFICANT NOTE
04/22/24 0800   Physical Therapy Time and Intention   Session Not Performed other (see comments)  (out of room for dialysis)

## 2024-04-22 NOTE — H&P
[facility]   HISTORY AND PHYSICAL    Patient Name: Charlette Rai  : 1935  MRN: [id number]  Primary Care Physician:  Rafael Lyons MD  Date of admission: 2024    Subjective   Subjective     Chief Complaint:   Shortness of breath      HPI:    Charlette Rai is a 89 y.o. female with known history of end-stage renal disease, COPD, CHF admitted again for shortness of breath.  Patient has recurrent admission to hospital for last several weeks.  Patient is getting dialyzed on a regular basis and she is not missing any appointments.  She is short of breath, congested and coughing.  No fever or chills.      Review of Systems:    Shortness of breath, exertional dyspnea, cough, wheezing.  No nausea vomiting or diarrhea.  No swelling of legs.  No chest pain    Personal History     Past Medical History:   Diagnosis Date    Allergic rhinitis     Anemia     Arthritis     Asthma     USE INHALERS AND NEBULIZERS    Back pain     Bladder disorder     Cancer     LEFT LUNG CANCER  SURGERY AND CHEMO DONE  AND CURRENTLY   RIGHT LUNG CANCER HAS ONLY RECEIVED RADIATION THUS FAR    Chronic kidney disease     stage 3    CKD (chronic kidney disease) stage 4, GFR 15-29 ml/min     Condition not found     ulcer    Congestive heart failure (CHF)     FOLLOWED BY DR AQUINO. DENIES CP BUT DOES HAVE SOA CHRONIC ISSUE COPD/LUNG CANCER    COPD (chronic obstructive pulmonary disease)     FOLLOWED BY DR MARIAJOSE BAILEY    Coronary artery disease     DENIES CP BUT DOES GET SOA MOST OF THE TIME WITH EXERTION BUT OCC AT REST CHRONIC ISSUE COPD/LUNG CANCER    Deep vein thrombosis     Diabetes mellitus     DOES NOT CHECK BS DAILY    Disease of thyroid gland     HYPOTHYROIDISM    Essential hypertension     Gastric ulcer     GERD (gastroesophageal reflux disease)     Heart murmur     History of transfusion     NO ISSUES POST TRANSFUSION WAS MANY YEARS AGO    Hyperlipemia     Leukocytopenia     FOLLOWED BY DR MIR BAILEY    Limb swelling      Lumbago     Lumbar spinal stenosis     Lung cancer     Migraine headache     Multiple joint pain     On home oxygen therapy     3L/NC PRN    Osteopenia     Reflux esophagitis     Shortness of breath     Thyroid nodule     HAS ULTRASOUND YEARLY BEING MONITORED    Vascular disease     Vitamin D deficiency        Past Surgical History:   Procedure Laterality Date    ABDOMINAL SURGERY      APPENDECTOMY      ARTERIOVENOUS FISTULA/SHUNT SURGERY Left 05/10/2022    Procedure: Left basilic vein transposition;  Surgeon: Wan Barksdale MD;  Location: [facility] MAIN OR;  Service: Vascular;  Laterality: Left;    ARTERIOVENOUS FISTULA/SHUNT SURGERY Left 3/23/2023    Procedure: Ligation of left arm arteriovenous fistula;  Surgeon: Wan Barksdale MD;  Location: [facility] MAIN OR;  Service: Vascular;  Laterality: Left;    ARTERIOVENOUS FISTULA/SHUNT SURGERY Left 11/30/2023    Procedure: Creation of left arm arteriovenous graft;  Surgeon: Wan Barksdale MD;  Location: [facility] MAIN OR;  Service: Vascular;  Laterality: Left;    CARDIAC CATHETERIZATION  1996    CARDIAC SURGERY      CARDIAC SURGERY      fluid drained from heart    CATARACT EXTRACTION, BILATERAL  2003    COLONOSCOPY  2014    ENDOSCOPY  2016 2019    FEMORAL ARTERY STENT Bilateral     HYSTERECTOMY      LUNG BIOPSY Left 2005    lobectomy upper lung caner    LUNG VOLUME REDUCTION      OTHER SURGICAL HISTORY      artifical joints/limbs    REPLACEMENT TOTAL KNEE Left 2016    UPPER GASTROINTESTINAL ENDOSCOPY         Family History: family history includes Arthritis in her father and mother; Cancer in her brother, brother, brother, and brother; Diabetes in her brother; Other in her brother; Prostate cancer in her brother and brother. Otherwise pertinent FHx was reviewed and not pertinent to current issue.    Social History:  reports that she quit smoking about 19 years ago. Her smoking use included cigarettes. She started smoking about 72 years ago. She has a 52.5 pack-year  smoking history. She has been exposed to tobacco smoke. She has never used smokeless tobacco. She reports that she does not drink alcohol and does not use drugs.    Home Medications:  Dulaglutide, albuterol sulfate HFA, budesonide-formoterol, carvedilol, dexlansoprazole, doxycycline, ergocalciferol, hydrALAZINE, levocetirizine, linagliptin, nitroglycerin, polycarbophil, rosuvastatin, sevelamer, and traMADol      Allergies:  No Known Allergies    Objective   Objective     Vitals:   Temp:  [97.2 °F (36.2 °C)-98.4 °F (36.9 °C)] 97.8 °F (36.6 °C)  Heart Rate:  [74-92] 79  Resp:  [12-20] 20  BP: (144-178)/(39-71) 160/60  Flow (L/min):  [2-3] 2    Physical Exam    Elderly female short of breath.  Neck supple.  Heart regular, grade 2 murmur.  Lungs diminished breath sounds.  Bilateral rhonchi and crackles.  Abdomen soft and obese.  Extremities no edema.  Neuro awake alert      I have personally reviewed the results from the time of this admission to 4/22/2024 09:36 EDT and agree with these findings:  [x]  Laboratory  [x]  Microbiology  [x]  Radiology  [x]  EKG/Telemetry   []  Cardiology/Vascular   []  Pathology  []  Old records  []  Other:    CBC:    WBC   Date Value Ref Range Status   04/22/2024 5.25 3.40 - 10.80 10*3/mm3 Final     RBC   Date Value Ref Range Status   04/22/2024 2.73 (L) 3.77 - 5.28 10*6/mm3 Final     Hemoglobin   Date Value Ref Range Status   04/22/2024 8.3 (L) 12.0 - 15.9 g/dL Final     Hematocrit   Date Value Ref Range Status   04/22/2024 27.3 (L) 34.0 - 46.6 % Final     MCV   Date Value Ref Range Status   04/22/2024 100.0 (H) 79.0 - 97.0 fL Final     MCH   Date Value Ref Range Status   04/22/2024 30.4 26.6 - 33.0 pg Final     MCHC   Date Value Ref Range Status   04/22/2024 30.4 (L) 31.5 - 35.7 g/dL Final     RDW   Date Value Ref Range Status   04/22/2024 14.7 12.3 - 15.4 % Final     RDW-SD   Date Value Ref Range Status   04/22/2024 53.2 37.0 - 54.0 fl Final     MPV   Date Value Ref Range Status    04/22/2024 9.8 6.0 - 12.0 fL Final     Platelets   Date Value Ref Range Status   04/22/2024 152 140 - 450 10*3/mm3 Final     Neutrophil %   Date Value Ref Range Status   04/22/2024 92.4 (H) 42.7 - 76.0 % Final     Lymphocyte %   Date Value Ref Range Status   04/22/2024 5.5 (L) 19.6 - 45.3 % Final     Monocyte %   Date Value Ref Range Status   04/22/2024 1.1 (L) 5.0 - 12.0 % Final     Eosinophil %   Date Value Ref Range Status   04/22/2024 0.0 (L) 0.3 - 6.2 % Final     Basophil %   Date Value Ref Range Status   04/22/2024 0.2 0.0 - 1.5 % Final     Immature Grans %   Date Value Ref Range Status   04/22/2024 0.8 (H) 0.0 - 0.5 % Final     Neutrophils, Absolute   Date Value Ref Range Status   04/22/2024 4.85 1.70 - 7.00 10*3/mm3 Final     Lymphocytes, Absolute   Date Value Ref Range Status   04/22/2024 0.29 (L) 0.70 - 3.10 10*3/mm3 Final     Monocytes, Absolute   Date Value Ref Range Status   04/22/2024 0.06 (L) 0.10 - 0.90 10*3/mm3 Final     Eosinophils, Absolute   Date Value Ref Range Status   04/22/2024 0.00 0.00 - 0.40 10*3/mm3 Final     Basophils, Absolute   Date Value Ref Range Status   04/22/2024 0.01 0.00 - 0.20 10*3/mm3 Final     Immature Grans, Absolute   Date Value Ref Range Status   04/22/2024 0.04 0.00 - 0.05 10*3/mm3 Final     nRBC   Date Value Ref Range Status   04/22/2024 0.0 0.0 - 0.2 /100 WBC Final        BMP:    Lab Results   Component Value Date    GLUCOSE 239 (H) 04/22/2024    BUN 55 (H) 04/22/2024    CREATININE 4.05 (H) 04/22/2024    EGFRIFNONA 17 (L) 01/10/2022    BCR 13.6 04/22/2024    K 4.2 04/22/2024    CO2 22.1 04/22/2024    CALCIUM 7.9 (L) 04/22/2024    ALBUMIN 3.3 (L) 04/21/2024    LABIL2 1.5 05/05/2021    AST 12 04/21/2024    ALT 10 04/21/2024        XR Chest 1 View    Result Date: 4/21/2024  Impression: No significant change in comparison to 4/17/2024, as above.   Electronically Signed By-SAVI LUCERO MD On:4/21/2024 1:48 PM              Assessment & Plan   Assessment / Plan        Current Diagnosis:  Active Hospital Problems    Diagnosis     ESRD (end stage renal disease)     COPD exacerbation     Anemia of chronic disease     Essential hypertension     Acute exacerbation of CHF (congestive heart failure)     Chronic obstructive pulmonary disease with acute exacerbation      Plan:       Admit to hospital.  Recurrent admission for shortness of breath.  At this point we will consult pulmonary and cardiology to assist.  Will start steroids.  Neb treatments.  Patient sounds congested.  Will also get an echo.  Hemodialysis 3 times a week, consulted Dr. García.  Further management will based on clinical course.  And consultant's input    DVT prophylaxis:  Medical and mechanical DVT prophylaxis orders are present.        GI Prophylaxis:       Pepcid    CODE STATUS:    Code Status (Patient has no pulse and is not breathing): CPR (Attempt to Resuscitate)  Medical Interventions (Patient has pulse or is breathing): Full Support    Admission Status:  I believe this patient meets inpatient status.             I have dictated this note utilizing Dragon Dictation.             Please note that portions of this note were completed with a voice recognition program.             Part of this note may be an electronic transcription/translation of spoken language to printed text         using the Dragon Dictation System.       Electronically signed by Rafael Lyons MD, 04/22/24, 7:58 AM EDT.    Total time spent with in evaluation and management:

## 2024-04-23 ENCOUNTER — ANESTHESIA EVENT (OUTPATIENT)
Dept: GASTROENTEROLOGY | Facility: HOSPITAL | Age: 89
End: 2024-04-23
Payer: MEDICARE

## 2024-04-23 PROBLEM — I50.33 ACUTE ON CHRONIC HEART FAILURE WITH PRESERVED EJECTION FRACTION (HFPEF): Status: ACTIVE | Noted: 2021-11-03

## 2024-04-23 LAB
ALBUMIN SERPL-MCNC: 3.3 G/DL (ref 3.5–5.2)
ANION GAP SERPL CALCULATED.3IONS-SCNC: 11.7 MMOL/L (ref 5–15)
BACTERIA SPEC RESP CULT: NORMAL
BUN SERPL-MCNC: 33 MG/DL (ref 8–23)
BUN/CREAT SERPL: 10.6 (ref 7–25)
CALCIUM SPEC-SCNC: 8.3 MG/DL (ref 8.6–10.5)
CHLORIDE SERPL-SCNC: 101 MMOL/L (ref 98–107)
CO2 SERPL-SCNC: 23.3 MMOL/L (ref 22–29)
CREAT SERPL-MCNC: 3.11 MG/DL (ref 0.57–1)
DEPRECATED RDW RBC AUTO: 53.9 FL (ref 37–54)
EGFRCR SERPLBLD CKD-EPI 2021: 14 ML/MIN/1.73
ERYTHROCYTE [DISTWIDTH] IN BLOOD BY AUTOMATED COUNT: 15.2 % (ref 12.3–15.4)
FERRITIN SERPL-MCNC: 1995 NG/ML (ref 13–150)
GLUCOSE BLDC GLUCOMTR-MCNC: 116 MG/DL (ref 70–99)
GLUCOSE BLDC GLUCOMTR-MCNC: 149 MG/DL (ref 70–99)
GLUCOSE BLDC GLUCOMTR-MCNC: 155 MG/DL (ref 70–99)
GLUCOSE BLDC GLUCOMTR-MCNC: 203 MG/DL (ref 70–99)
GLUCOSE SERPL-MCNC: 110 MG/DL (ref 65–99)
GRAM STN SPEC: NORMAL
HCT VFR BLD AUTO: 26.8 % (ref 34–46.6)
HGB BLD-MCNC: 8.1 G/DL (ref 12–15.9)
IRON 24H UR-MRATE: 82 MCG/DL (ref 37–145)
IRON SATN MFR SERPL: 41 % (ref 20–50)
MCH RBC QN AUTO: 29.9 PG (ref 26.6–33)
MCHC RBC AUTO-ENTMCNC: 30.2 G/DL (ref 31.5–35.7)
MCV RBC AUTO: 98.9 FL (ref 79–97)
PHOSPHATE SERPL-MCNC: 3.4 MG/DL (ref 2.5–4.5)
PLATELET # BLD AUTO: 183 10*3/MM3 (ref 140–450)
PMV BLD AUTO: 9.4 FL (ref 6–12)
POTASSIUM SERPL-SCNC: 4.2 MMOL/L (ref 3.5–5.2)
RBC # BLD AUTO: 2.71 10*6/MM3 (ref 3.77–5.28)
SODIUM SERPL-SCNC: 136 MMOL/L (ref 136–145)
TIBC SERPL-MCNC: 200 MCG/DL (ref 298–536)
TRANSFERRIN SERPL-MCNC: 134 MG/DL (ref 200–360)
WBC NRBC COR # BLD AUTO: 9.76 10*3/MM3 (ref 3.4–10.8)

## 2024-04-23 PROCEDURE — 82728 ASSAY OF FERRITIN: CPT | Performed by: INTERNAL MEDICINE

## 2024-04-23 PROCEDURE — 94799 UNLISTED PULMONARY SVC/PX: CPT

## 2024-04-23 PROCEDURE — 84466 ASSAY OF TRANSFERRIN: CPT | Performed by: INTERNAL MEDICINE

## 2024-04-23 PROCEDURE — 63710000001 INSULIN LISPRO (HUMAN) PER 5 UNITS: Performed by: INTERNAL MEDICINE

## 2024-04-23 PROCEDURE — 87077 CULTURE AEROBIC IDENTIFY: CPT | Performed by: INTERNAL MEDICINE

## 2024-04-23 PROCEDURE — 85027 COMPLETE CBC AUTOMATED: CPT | Performed by: INTERNAL MEDICINE

## 2024-04-23 PROCEDURE — 82948 REAGENT STRIP/BLOOD GLUCOSE: CPT

## 2024-04-23 PROCEDURE — 99222 1ST HOSP IP/OBS MODERATE 55: CPT | Performed by: INTERNAL MEDICINE

## 2024-04-23 PROCEDURE — 87205 SMEAR GRAM STAIN: CPT | Performed by: INTERNAL MEDICINE

## 2024-04-23 PROCEDURE — 94664 DEMO&/EVAL PT USE INHALER: CPT

## 2024-04-23 PROCEDURE — 87070 CULTURE OTHR SPECIMN AEROBIC: CPT | Performed by: INTERNAL MEDICINE

## 2024-04-23 PROCEDURE — 87186 SC STD MICRODIL/AGAR DIL: CPT | Performed by: INTERNAL MEDICINE

## 2024-04-23 PROCEDURE — 25010000002 METHYLPREDNISOLONE PER 125 MG: Performed by: INTERNAL MEDICINE

## 2024-04-23 PROCEDURE — 83540 ASSAY OF IRON: CPT | Performed by: INTERNAL MEDICINE

## 2024-04-23 PROCEDURE — 97161 PT EVAL LOW COMPLEX 20 MIN: CPT

## 2024-04-23 PROCEDURE — 99233 SBSQ HOSP IP/OBS HIGH 50: CPT | Performed by: INTERNAL MEDICINE

## 2024-04-23 PROCEDURE — 80069 RENAL FUNCTION PANEL: CPT | Performed by: INTERNAL MEDICINE

## 2024-04-23 RX ORDER — ISOSORBIDE DINITRATE 20 MG/1
20 TABLET ORAL
Status: DISCONTINUED | OUTPATIENT
Start: 2024-04-23 | End: 2024-04-29 | Stop reason: HOSPADM

## 2024-04-23 RX ADMIN — SEVELAMER CARBONATE 800 MG: 800 TABLET, FILM COATED ORAL at 08:35

## 2024-04-23 RX ADMIN — AZITHROMYCIN DIHYDRATE 250 MG: 250 TABLET ORAL at 08:26

## 2024-04-23 RX ADMIN — CARVEDILOL 25 MG: 25 TABLET, FILM COATED ORAL at 08:26

## 2024-04-23 RX ADMIN — SEVELAMER CARBONATE 800 MG: 800 TABLET, FILM COATED ORAL at 17:44

## 2024-04-23 RX ADMIN — HYDRALAZINE HYDROCHLORIDE 25 MG: 50 TABLET ORAL at 08:27

## 2024-04-23 RX ADMIN — Medication 1 TABLET: at 08:26

## 2024-04-23 RX ADMIN — INSULIN LISPRO 4 UNITS: 100 INJECTION, SOLUTION INTRAVENOUS; SUBCUTANEOUS at 20:09

## 2024-04-23 RX ADMIN — ROSUVASTATIN CALCIUM 20 MG: 20 TABLET, FILM COATED ORAL at 20:10

## 2024-04-23 RX ADMIN — Medication 10 ML: at 20:11

## 2024-04-23 RX ADMIN — FAMOTIDINE 20 MG: 20 TABLET ORAL at 08:26

## 2024-04-23 RX ADMIN — ACETAMINOPHEN 650 MG: 325 TABLET ORAL at 22:31

## 2024-04-23 RX ADMIN — ARFORMOTEROL TARTRATE 15 MCG: 15 SOLUTION RESPIRATORY (INHALATION) at 18:26

## 2024-04-23 RX ADMIN — BUDESONIDE 0.5 MG: 0.5 SUSPENSION RESPIRATORY (INHALATION) at 18:26

## 2024-04-23 RX ADMIN — METHYLPREDNISOLONE SODIUM SUCCINATE 62.5 MG: 125 INJECTION INTRAMUSCULAR; INTRAVENOUS at 08:26

## 2024-04-23 RX ADMIN — IPRATROPIUM BROMIDE AND ALBUTEROL SULFATE 3 ML: .5; 3 SOLUTION RESPIRATORY (INHALATION) at 13:15

## 2024-04-23 RX ADMIN — ISOSORBIDE DINITRATE 20 MG: 20 TABLET ORAL at 17:44

## 2024-04-23 RX ADMIN — SEVELAMER CARBONATE 800 MG: 800 TABLET, FILM COATED ORAL at 11:18

## 2024-04-23 RX ADMIN — Medication 10 ML: at 08:27

## 2024-04-23 RX ADMIN — INSULIN LISPRO 2 UNITS: 100 INJECTION, SOLUTION INTRAVENOUS; SUBCUTANEOUS at 12:55

## 2024-04-23 RX ADMIN — ARFORMOTEROL TARTRATE 15 MCG: 15 SOLUTION RESPIRATORY (INHALATION) at 08:05

## 2024-04-23 RX ADMIN — IPRATROPIUM BROMIDE AND ALBUTEROL SULFATE 3 ML: .5; 3 SOLUTION RESPIRATORY (INHALATION) at 18:26

## 2024-04-23 RX ADMIN — BUDESONIDE 0.5 MG: 0.5 SUSPENSION RESPIRATORY (INHALATION) at 08:05

## 2024-04-23 RX ADMIN — ACETAMINOPHEN 650 MG: 325 TABLET ORAL at 03:41

## 2024-04-23 NOTE — CONSULTS
Met with patient at bedside. Discussed current A1c of 5.7% with an estimated average glucose of 117 mg/dL. Encouraged patient to continue the same home care, as she is within our A1c goal of less than 7.0%. Patient states she checks her blood sugar about once a week at home. No further questions at this time. Will continue to support and assist as needed.

## 2024-04-23 NOTE — PROGRESS NOTES
" LOS: 1 day   Patient Care Team:  Rafael Lyons MD as PCP - General (Internal Medicine)  Regis Mckeon MD as Consulting Physician (Radiation Oncology)  Carlton Casillas MD as Consulting Physician (Gastroenterology)  Susan Marcos APRN as Nurse Practitioner (Gastroenterology)    Chief Complaint: ESRD    Subjective     History of Present Illness  Pt still with increased dyspnea  No problems on dialysis yesterday    Subjective:  Symptoms:  She reports shortness of breath and cough.  No chest pain, chest pressure or anxiety.    Diet:  No nausea or vomiting.        History taken from: patient chart    Objective     Vital Sign Min/Max for last 24 hours  Temp  Min: 97.5 °F (36.4 °C)  Max: 98.1 °F (36.7 °C)   BP  Min: 149/58  Max: 176/52   Pulse  Min: 76  Max: 86   Resp  Min: 16  Max: 22   SpO2  Min: 96 %  Max: 100 %   Flow (L/min)  Min: 2  Max: 3   No data recorded     Flowsheet Rows      Flowsheet Row First Filed Value   Admission Height 165.1 cm (65\") Documented at 04/21/2024 1328   Admission Weight 61.6 kg (135 lb 12.9 oz) Documented at 04/21/2024 1328            I/O this shift:  In: 360 [P.O.:360]  Out: -   I/O last 3 completed shifts:  In: 630 [P.O.:380; IV Piggyback:250]  Out: 1500     Objective:  General Appearance:  Comfortable.    Vital signs: (most recent): Blood pressure 166/74, pulse 85, temperature 97.5 °F (36.4 °C), temperature source Oral, resp. rate 22, height 165.1 cm (65\"), weight 61.3 kg (135 lb 2.3 oz), SpO2 100%, not currently breastfeeding.  Vital signs are normal.    HEENT: Normal HEENT exam.    Lungs:  Normal effort and normal respiratory rate.  There are rhonchi.    Heart: Normal rate.  Regular rhythm.    Abdomen: Abdomen is soft.  Bowel sounds are normal.   There is no abdominal tenderness.     Extremities: Normal range of motion.  There is no dependent edema.    Pulses: Distal pulses are intact.    Neurological: Patient is alert and oriented to person, place and time.    Pupils:  " "Pupils are equal, round, and reactive to light.                Results Review:     I reviewed the patient's new clinical results.  I reviewed the patient's new imaging results and agree with the interpretation.  I reviewed the patient's other test results and agree with the interpretation    WBC WBC   Date Value Ref Range Status   04/23/2024 9.76 3.40 - 10.80 10*3/mm3 Final   04/22/2024 5.25 3.40 - 10.80 10*3/mm3 Final   04/21/2024 7.66 3.40 - 10.80 10*3/mm3 Final      HGB Hemoglobin   Date Value Ref Range Status   04/23/2024 8.1 (L) 12.0 - 15.9 g/dL Final   04/22/2024 8.3 (L) 12.0 - 15.9 g/dL Final   04/21/2024 8.5 (L) 12.0 - 15.9 g/dL Final      HCT Hematocrit   Date Value Ref Range Status   04/23/2024 26.8 (L) 34.0 - 46.6 % Final   04/22/2024 27.3 (L) 34.0 - 46.6 % Final   04/21/2024 28.2 (L) 34.0 - 46.6 % Final      Platlets No results found for: \"LABPLAT\"   MCV MCV   Date Value Ref Range Status   04/23/2024 98.9 (H) 79.0 - 97.0 fL Final   04/22/2024 100.0 (H) 79.0 - 97.0 fL Final   04/21/2024 100.7 (H) 79.0 - 97.0 fL Final          Sodium Sodium   Date Value Ref Range Status   04/23/2024 136 136 - 145 mmol/L Final   04/22/2024 139 136 - 145 mmol/L Final   04/21/2024 138 136 - 145 mmol/L Final      Potassium Potassium   Date Value Ref Range Status   04/23/2024 4.2 3.5 - 5.2 mmol/L Final   04/22/2024 4.2 3.5 - 5.2 mmol/L Final   04/21/2024 4.2 3.5 - 5.2 mmol/L Final     Comment:     Slight hemolysis detected by analyzer. Result may be falsely elevated.      Chloride Chloride   Date Value Ref Range Status   04/23/2024 101 98 - 107 mmol/L Final   04/22/2024 102 98 - 107 mmol/L Final   04/21/2024 100 98 - 107 mmol/L Final      CO2 CO2   Date Value Ref Range Status   04/23/2024 23.3 22.0 - 29.0 mmol/L Final   04/22/2024 22.1 22.0 - 29.0 mmol/L Final   04/21/2024 25.3 22.0 - 29.0 mmol/L Final      BUN BUN   Date Value Ref Range Status   04/23/2024 33 (H) 8 - 23 mg/dL Final   04/22/2024 55 (H) 8 - 23 mg/dL Final " "  04/21/2024 48 (H) 8 - 23 mg/dL Final      Creatinine Creatinine   Date Value Ref Range Status   04/23/2024 3.11 (H) 0.57 - 1.00 mg/dL Final   04/22/2024 4.05 (H) 0.57 - 1.00 mg/dL Final   04/21/2024 4.21 (H) 0.57 - 1.00 mg/dL Final      Calcium Calcium   Date Value Ref Range Status   04/23/2024 8.3 (L) 8.6 - 10.5 mg/dL Final   04/22/2024 7.9 (L) 8.6 - 10.5 mg/dL Final   04/21/2024 7.9 (L) 8.6 - 10.5 mg/dL Final      PO4 No results found for: \"CAPO4\"   Albumin Albumin   Date Value Ref Range Status   04/23/2024 3.3 (L) 3.5 - 5.2 g/dL Final   04/21/2024 3.3 (L) 3.5 - 5.2 g/dL Final      Magnesium Magnesium   Date Value Ref Range Status   04/22/2024 1.8 1.6 - 2.4 mg/dL Final      Uric Acid No results found for: \"URICACID\"     Medication Review:   arformoterol, 15 mcg, Nebulization, BID - RT  azithromycin, 250 mg, Oral, Q24H  budesonide, 0.5 mg, Nebulization, BID - RT  carvedilol, 25 mg, Oral, BID With Meals  [START ON 4/29/2024] ergocalciferol, 50,000 Units, Oral, Q14 Days  famotidine, 20 mg, Oral, Daily  hydrALAZINE, 25 mg, Oral, Q12H  insulin lispro, 2-9 Units, Subcutaneous, 4x Daily AC & at Bedtime  methylPREDNISolone sodium succinate, 62.5 mg, Intravenous, Daily  multivitamin with minerals, 1 tablet, Oral, Daily  rosuvastatin, 20 mg, Oral, Nightly  sevelamer, 800 mg, Oral, TID With Meals  sodium chloride, 10 mL, Intravenous, Q12H          Assessment & Plan       Chronic obstructive pulmonary disease with acute exacerbation    Acute exacerbation of CHF (congestive heart failure)    Essential hypertension    Anemia of chronic disease    COPD exacerbation    ESRD (end stage renal disease)      Assessment & Plan  -ESRD, dialysis 3 times a week, continue current schedule.  UF as tolerated.  Volume status and electrolytes seem to be adequate.  - Shortness of breath, recurrent.  - Clotted access, status post recent declot.  Currently using right IJ TDC.  - Anemia of chronic kidney disease, receiving long-acting CASSANDRA as " outpatient.  - Diastolic dysfunction with LVH and moderate aortic stenosis.  Cardiology evaluating.  - Left renal lesion with recent spontaneous right perinephric subcapsular hematoma, needs urology follow-up as outpatient.  - History of hypertension  - Type 2 diabetes with complications.  - History of lung cancer and COPD.    Elliott Aviles MD  04/23/24  10:02 EDT

## 2024-04-23 NOTE — PLAN OF CARE
Goal Outcome Evaluation:  Plan of Care Reviewed With: patient        Progress: no change  Outcome Evaluation: Pt presents with limitations that impede their ability to safely and independently transfer and ambulate. The skills of a therapist will be required to safely and effectively implement the following treatment plan to restore maximal level of function.      Anticipated Discharge Disposition (PT): inpatient rehabilitation facility, skilled nursing facility, home with home health

## 2024-04-23 NOTE — PROGRESS NOTES
Pulmonary / Critical Care Progress Note      Patient Name: Charlette Rai  : 1935  MRN: [id number]  Primary Care Physician:  Rafael Lyons MD  Date of admission: 2024    Subjective   Subjective   Follow-up for persistent bronchitis, respiratory failure, failure with airway clearance, ESRD on dialysis.    Over past 24 hours: CT scan of the chest was done.  Continue to dialysis.    No acute events overnight.     This morning,   I reviewed the CT scan finding.  Has mucous plug right bronchus intermedius.  Continues to feel short of breath.  Has difficulty with clearing secretions.  No nausea or vomiting.  No fever or chills.    Review of Systems  General:  Fatigue, No Fever  HEENT: No dysphagia, No Visual Changes, no rhinorrhea  Respiratory:  + cough,+Dyspnea, no phlegm, No Pleuritic Pain, + wheezing, no hemoptysis  Cardiovascular: Denies chest pain, denies palpitations,+HAIDER, No Chest Pressure  Gastrointestinal:  No Abdominal Pain, No Nausea, No Vomiting, No Diarrhea  Genitourinary:  No Dysuria, No Frequency, No Hesitancy  Musculoskeletal: No muscle pain or swelling  Endocrine:  No Heat Intolerance, No Cold Intolerance  Hematologic:  No Bleeding, No Bruising  Psychiatric:  No Anxiety, No Depression  Neurologic:  No Confusion, no Dysarthria, No Headaches  Skin:  No Rash, No Open Wounds          Objective   Objective     Vitals:   Temp:  [97.2 °F (36.2 °C)-98.1 °F (36.7 °C)] 97.5 °F (36.4 °C)  Heart Rate:  [76-86] 85  Resp:  [16-22] 22  BP: (149-178)/(50-74) 166/74  Flow (L/min):  [2] 2  Physical Exam   Vital Signs Reviewed   General:  WDWN, Alert, mild distress, has conversational dyspnea.    HEENT:  PERRL, EOMI.  OP, nares clear, no sinus tenderness  Neck:  Supple, no JVD, no thyromegaly  Lymph: no axillary, cervical, supraclavicular lymphadenopathy noted bilaterally  Chest: Poor aeration, minimal scattered rhonchi, no wheezing, bibasilar crackles, resonant to percussion bilaterally, TDC in place  CV: RRR,  no MGR, pulses 2+, equal.  Abd:  Soft, NT, ND, + BS, no HSM  EXT:  no clubbing, no cyanosis, trace lower extremity edema, no joint tenderness  Neuro:  A&Ox3, CN grossly intact, no focal deficits.  Skin: No rashes or lesions noted      Result Review    Result Review:  I have personally reviewed the results from the time of this admission to 4/23/2024 07:52 EDT and agree with these findings:  [x]  Laboratory  [x]  Microbiology  [x]  Radiology  [x]  EKG/Telemetry   [x]  Cardiology/Vascular   []  Pathology  []  Old records  []  Other:  Most notable findings include:         Lab 04/23/24  0552 04/22/24  0518 04/21/24  1350 04/17/24  1940   WBC 9.76 5.25 7.66 9.84   HEMOGLOBIN 8.1* 8.3* 8.5* 9.9*   HEMATOCRIT 26.8* 27.3* 28.2* 33.0*   PLATELETS 183 152 150 112*   SODIUM 136 139 138 136   POTASSIUM 4.2 4.2 4.2 4.5   CHLORIDE 101 102 100 98   CO2 23.3 22.1 25.3 25.6   BUN 33* 55* 48* 16   CREATININE 3.11* 4.05* 4.21* 1.86*   GLUCOSE 110* 239* 148* 135*   CALCIUM 8.3* 7.9* 7.9* 8.7   PHOSPHORUS 3.4 4.3  --   --    TOTAL PROTEIN  --   --  5.8* 6.5   ALBUMIN 3.3*  --  3.3* 3.9   GLOBULIN  --   --  2.5 2.6     XR Chest 1 View    Result Date: 4/21/2024  XR CHEST 1 VW-  Date of Exam: 4/21/2024 1:43 PM  Indication: SOA Triage Protocol  Comparison: 4/17/2024  Findings: Postsurgical changes in the left hemithorax with shift of mediastinal structures from right to left appear stable.  Linear opacity in the right upper lobe is stable.  Right jugular central venous catheter is in unchanged position, the tip is in the mid SVC.  Bony structures appear intact.      Impression: Impression: No significant change in comparison to 4/17/2024, as above.   Electronically Signed By-SAVI LUCERO MD On:4/21/2024 1:48 PM         Assessment & Plan   Assessment / Plan     Active Hospital Problems:  Active Hospital Problems    Diagnosis     ESRD (end stage renal disease)     COPD exacerbation     Anemia of chronic disease     Essential hypertension      Acute exacerbation of CHF (congestive heart failure)     Chronic obstructive pulmonary disease with acute exacerbation          Impression:   Diastolic heart failure with acute exacerbation  COPD with acute exacerbation  End-stage renal disease on dialysis  History of smoking in past  Persistent cough  Viral bronchitis with human metapneumovirus    Plan:   Check sputum culture.  Respiratory viral panel was positive for human metapneumovirus.  Continue with Brovana and Pulmicort nebulizers twice daily.  Was on Symbicort at home.  Will likely need scheduled DuoNeb or Spiriva at home.  Start DuoNeb nebulizer 4 times a day  CT chest with likely secretions obstructing right bronchus intermedius.  Discussed bronchoscopy with bronchoalveolar lavage, bronchial washing, possible transbronchial biopsy, endobronchial brushing, airway inspection.  Risks and benefits of the procedure were discussed in detail.  Alternatives and options were reviewed.  Risks including pneumothorax, pneumonia, bleeding, respiratory depression and that it could be fatal were all reviewed.  Patient understands and is agreeable for the procedure.  N.p.o. after midnight.  We will plan bronchoscopy tomorrow.  Continue dialysis per nephrology service.  Cardiac meds including Coreg, hydralazine per primary service and cardiology service.  Continue with azithromycin 250 mg once daily.       DVT prophylaxis:  Medical and mechanical DVT prophylaxis orders are present.        CODE STATUS:   Code Status (Patient has no pulse and is not breathing): CPR (Attempt to Resuscitate)  Medical Interventions (Patient has pulse or is breathing): Full Support      I personally reviewed pertinent labs, imaging and provider notes. Discussed with bedside nurse and will discuss with primary service.       Electronically signed by Khalif Yanez MD, 4/23/2024, 07:52 EDT.

## 2024-04-23 NOTE — PROGRESS NOTES
[facility]     Progress Note    Patient Name: Charlette Rai  : 1935  MRN: [id number]  Primary Care Physician:  Rafael Lyons MD  Date of admission: 2024      Subjective   Brief summary.  Patient admitted with increasing shortness of breath      HPI:  Continues to feel chest congestion and cough and wheezing.  Short of breath with exertion.  Postdialysis yesterday.    Review of Systems     No fever or chills, no chest pain, weakness and shortness of breath and cough and congestion.      Objective     Vitals:   Temp:  [97.5 °F (36.4 °C)-98.1 °F (36.7 °C)] 97.5 °F (36.4 °C)  Heart Rate:  [77-86] 77  Resp:  [16-22] 20  BP: (152-176)/(50-74) 152/50  Flow (L/min):  [2-3] 3    Physical Exam :     Elderly female not in respiratory distress.  Heart regular.  Lungs diffuse rhonchi.  Congested.  Abdomen soft.  Extremities no edema.  Neuro awake alert and oriented      Result Review:  I have personally reviewed the results from the time of this admission to 2024 17:16 EDT and agree with these findings:  [x]  Laboratory  []  Microbiology  [x]  Radiology  []  EKG/Telemetry   []  Cardiology/Vascular   []  Pathology  []  Old records  []  Other:           Assessment / Plan       Active Hospital Problems:  Active Hospital Problems    Diagnosis     ESRD (end stage renal disease)     COPD exacerbation     COPD with acute exacerbation     ESRD (end stage renal disease)     Anemia of chronic disease     Essential hypertension     Acute on chronic heart failure with preserved ejection fraction (HFpEF)     Chronic obstructive pulmonary disease with acute exacerbation        Plan:   Continue steroids continue diuresis.  Further hemodialysis.  Consulted cardiologist and pulmonologist.  Appreciate their input.  Discussed with patient about inpatient rehab and nursing home patient not inclined he wants to go home when she is ready.  Monitor labs and respiratory status.       DVT prophylaxis:  Medical and mechanical DVT  prophylaxis orders are present.        CODE STATUS:   Code Status (Patient has no pulse and is not breathing): CPR (Attempt to Resuscitate)  Medical Interventions (Patient has pulse or is breathing): Full Support            Electronically signed by Rafael Lyons MD, 04/23/24, 5:16 PM EDT.

## 2024-04-23 NOTE — PLAN OF CARE
Problem: Adult Inpatient Plan of Care  Goal: Plan of Care Review  Outcome: Ongoing, Progressing  Flowsheets (Taken 4/23/2024 0605)  Plan of Care Reviewed With: patient  Goal: Patient-Specific Goal (Individualized)  Outcome: Ongoing, Progressing  Goal: Absence of Hospital-Acquired Illness or Injury  Outcome: Ongoing, Progressing  Intervention: Identify and Manage Fall Risk  Recent Flowsheet Documentation  Taken 4/23/2024 0500 by Betsy Palm RN  Safety Promotion/Fall Prevention:   nonskid shoes/slippers when out of bed   safety round/check completed  Taken 4/23/2024 0341 by Betsy Palm RN  Safety Promotion/Fall Prevention:   nonskid shoes/slippers when out of bed   safety round/check completed  Taken 4/23/2024 0300 by Betsy Palm RN  Safety Promotion/Fall Prevention:   nonskid shoes/slippers when out of bed   safety round/check completed  Taken 4/23/2024 0100 by Betsy Palm RN  Safety Promotion/Fall Prevention:   nonskid shoes/slippers when out of bed   safety round/check completed  Taken 4/22/2024 2300 by Betsy Palm RN  Safety Promotion/Fall Prevention:   nonskid shoes/slippers when out of bed   safety round/check completed  Taken 4/22/2024 2109 by Betsy Palm RN  Safety Promotion/Fall Prevention:   nonskid shoes/slippers when out of bed   safety round/check completed  Taken 4/22/2024 2100 by Betsy Palm RN  Safety Promotion/Fall Prevention:   nonskid shoes/slippers when out of bed   safety round/check completed  Taken 4/22/2024 1940 by Betsy Palm RN  Safety Promotion/Fall Prevention:   nonskid shoes/slippers when out of bed   safety round/check completed  Intervention: Prevent Skin Injury  Recent Flowsheet Documentation  Taken 4/22/2024 1940 by Betsy Palm RN  Body Position: position changed independently  Intervention: Prevent and Manage VTE (Venous Thromboembolism) Risk  Recent Flowsheet Documentation  Taken 4/22/2024 1940 by Betsy Palm RN  Activity Management:   ambulated in room   back to bed   up  to bedside commode   activity encouraged  VTE Prevention/Management:   bilateral   sequential compression devices off   patient refused intervention  Range of Motion: active ROM (range of motion) encouraged  Intervention: Prevent Infection  Recent Flowsheet Documentation  Taken 4/23/2024 0500 by Betsy Palm RN  Infection Prevention:   hand hygiene promoted   single patient room provided  Taken 4/23/2024 0300 by Betsy Palm RN  Infection Prevention:   hand hygiene promoted   rest/sleep promoted   single patient room provided  Taken 4/23/2024 0100 by Betsy Palm RN  Infection Prevention:   hand hygiene promoted   single patient room provided  Taken 4/22/2024 2300 by Betsy Palm RN  Infection Prevention:   hand hygiene promoted   single patient room provided  Taken 4/22/2024 2100 by Betsy Palm RN  Infection Prevention:   hand hygiene promoted   single patient room provided  Taken 4/22/2024 1940 by Betsy Palm RN  Infection Prevention:   hand hygiene promoted   single patient room provided  Goal: Optimal Comfort and Wellbeing  Outcome: Ongoing, Progressing  Intervention: Monitor Pain and Promote Comfort  Recent Flowsheet Documentation  Taken 4/22/2024 1940 by Betsy Palm RN  Pain Management Interventions:   quiet environment facilitated   position adjusted   pain management plan reviewed with patient/caregiver   care clustered  Intervention: Provide Person-Centered Care  Recent Flowsheet Documentation  Taken 4/22/2024 1940 by Betsy Palm RN  Trust Relationship/Rapport:   care explained   choices provided   emotional support provided   empathic listening provided   questions answered   questions encouraged   reassurance provided   thoughts/feelings acknowledged  Goal: Readiness for Transition of Care  Outcome: Ongoing, Progressing     Problem: Pain Acute  Goal: Acceptable Pain Control and Functional Ability  Outcome: Ongoing, Progressing  Intervention: Prevent or Manage Pain  Recent Flowsheet  Documentation  Taken 4/22/2024 1940 by Betsy Palm RN  Bowel Elimination Promotion: adequate fluid intake promoted  Intervention: Develop Pain Management Plan  Recent Flowsheet Documentation  Taken 4/22/2024 1940 by Betsy Palm RN  Pain Management Interventions:   quiet environment facilitated   position adjusted   pain management plan reviewed with patient/caregiver   care clustered  Intervention: Optimize Psychosocial Wellbeing  Recent Flowsheet Documentation  Taken 4/22/2024 1940 by Betsy Palm RN  Supportive Measures: active listening utilized  Diversional Activities: television     Problem: Adjustment to Illness COPD (Chronic Obstructive Pulmonary Disease)  Goal: Optimal Chronic Illness Coping  Outcome: Ongoing, Progressing  Intervention: Support and Optimize Psychosocial Response  Recent Flowsheet Documentation  Taken 4/22/2024 1940 by Betsy Palm RN  Supportive Measures: active listening utilized  Family/Support System Care:   support provided   self-care encouraged  Goal: Optimal Chronic Illness Coping  Outcome: Ongoing, Progressing  Intervention: Support and Optimize Psychosocial Response  Recent Flowsheet Documentation  Taken 4/22/2024 1940 by Betsy Palm RN  Supportive Measures: active listening utilized  Family/Support System Care:   support provided   self-care encouraged     Problem: Functional Ability Impaired COPD (Chronic Obstructive Pulmonary Disease)  Goal: Optimal Level of Functional [place]  Outcome: Ongoing, Progressing  Intervention: Optimize Functional Ability  Recent Flowsheet Documentation  Taken 4/22/2024 1940 by Betsy Palm RN  Activity Management:   ambulated in room   back to bed   up to bedside commode   activity encouraged  Environmental Support: calm environment promoted  Goal: Optimal Level of Functional [place]  Outcome: Ongoing, Progressing  Intervention: Optimize Functional Ability  Recent Flowsheet Documentation  Taken 4/22/2024 1940 by Betsy Palm RN  Activity  Management:   ambulated in room   back to bed   up to bedside commode   activity encouraged  Environmental Support: calm environment promoted     Problem: Infection COPD (Chronic Obstructive Pulmonary Disease)  Goal: Absence of Infection Signs and Symptoms  Outcome: Ongoing, Progressing  Goal: Absence of Infection Signs and Symptoms  Outcome: Ongoing, Progressing     Problem: Oral Intake Inadequate COPD (Chronic Obstructive Pulmonary Disease)  Goal: Improved Nutrition Intake  Outcome: Ongoing, Progressing  Goal: Improved Nutrition Intake  Outcome: Ongoing, Progressing     Problem: Respiratory Compromise COPD (Chronic Obstructive Pulmonary Disease)  Goal: Effective Oxygenation and Ventilation  Outcome: Ongoing, Progressing  Intervention: Promote Airway Secretion Clearance  Recent Flowsheet Documentation  Taken 4/22/2024 1940 by Betsy Palm RN  Activity Management:   ambulated in room   back to bed   up to bedside commode   activity encouraged  Cough And Deep Breathing: done independently per patient  Intervention: Optimize Oxygenation and Ventilation  Recent Flowsheet Documentation  Taken 4/23/2024 0500 by Betsy Palm RN  Head of Bed (HOB) Positioning: HOB elevated  Taken 4/23/2024 0341 by Betsy Palm RN  Head of Bed (HOB) Positioning: HOB elevated  Taken 4/23/2024 0300 by Betsy Palm RN  Head of Bed (HOB) Positioning: HOB elevated  Taken 4/23/2024 0100 by Betsy Palm RN  Head of Bed (HOB) Positioning: HOB elevated  Taken 4/22/2024 2300 by Betsy Palm RN  Head of Bed (HOB) Positioning: HOB elevated  Taken 4/22/2024 2109 by Betsy Palm RN  Head of Bed (HOB) Positioning: HOB elevated  Taken 4/22/2024 2100 by Betsy Palm RN  Head of Bed (HOB) Positioning: HOB elevated  Taken 4/22/2024 1940 by Betsy Palm RN  Head of Bed (HOB) Positioning: HOB elevated  Goal: Effective Oxygenation and Ventilation  Outcome: Ongoing, Progressing  Intervention: Promote Airway Secretion Clearance  Recent Flowsheet  Documentation  Taken 4/22/2024 1940 by Betsy Palm RN  Activity Management:   ambulated in room   back to bed   up to bedside commode   activity encouraged  Cough And Deep Breathing: done independently per patient  Intervention: Optimize Oxygenation and Ventilation  Recent Flowsheet Documentation  Taken 4/23/2024 0500 by Betsy Palm RN  Head of Bed (HOB) Positioning: HOB elevated  Taken 4/23/2024 0341 by Betsy Palm RN  Head of Bed (HOB) Positioning: HOB elevated  Taken 4/23/2024 0300 by Betsy Palm RN  Head of Bed (HOB) Positioning: HOB elevated  Taken 4/23/2024 0100 by Betsy Palm RN  Head of Bed (HOB) Positioning: HOB elevated  Taken 4/22/2024 2300 by Betsy Palm RN  Head of Bed (HOB) Positioning: HOB elevated  Taken 4/22/2024 2109 by Betsy Palm RN  Head of Bed (HOB) Positioning: HOB elevated  Taken 4/22/2024 2100 by Betsy Palm RN  Head of Bed (HOB) Positioning: HOB elevated  Taken 4/22/2024 1940 by Betsy Palm RN  Head of Bed (HOB) Positioning: HOB elevated     Problem: Infection  Goal: Absence of Infection Signs and Symptoms  Outcome: Ongoing, Progressing     Problem: Fall Injury Risk  Goal: Absence of Fall and Fall-Related Injury  Outcome: Ongoing, Progressing  Intervention: Promote Injury-Free Environment  Recent Flowsheet Documentation  Taken 4/23/2024 0500 by Betsy Palm RN  Safety Promotion/Fall Prevention:   nonskid shoes/slippers when out of bed   safety round/check completed  Taken 4/23/2024 0341 by Betsy Palm RN  Safety Promotion/Fall Prevention:   nonskid shoes/slippers when out of bed   safety round/check completed  Taken 4/23/2024 0300 by Betsy Palm RN  Safety Promotion/Fall Prevention:   nonskid shoes/slippers when out of bed   safety round/check completed  Taken 4/23/2024 0100 by Betsy Palm RN  Safety Promotion/Fall Prevention:   nonskid shoes/slippers when out of bed   safety round/check completed  Taken 4/22/2024 2300 by Betsy Palm RN  Safety Promotion/Fall Prevention:    nonskid shoes/slippers when out of bed   safety round/check completed  Taken 4/22/2024 2109 by Betsy Palm RN  Safety Promotion/Fall Prevention:   nonskid shoes/slippers when out of bed   safety round/check completed  Taken 4/22/2024 2100 by Betsy Palm RN  Safety Promotion/Fall Prevention:   nonskid shoes/slippers when out of bed   safety round/check completed  Taken 4/22/2024 1940 by Betsy Palm RN  Safety Promotion/Fall Prevention:   nonskid shoes/slippers when out of bed   safety round/check completed     Problem: Adjustment to Illness (Chronic Kidney Disease)  Goal: Optimal Coping with Chronic Illness  Outcome: Ongoing, Progressing  Intervention: Support Psychosocial Response  Recent Flowsheet Documentation  Taken 4/22/2024 1940 by Betsy Palm RN  Supportive Measures: active listening utilized  Family/Support System Care:   support provided   self-care encouraged     Problem: Electrolyte Imbalance (Chronic Kidney Disease)  Goal: Electrolyte Balance  Outcome: Ongoing, Progressing     Problem: Fluid Volume Excess (Chronic Kidney Disease)  Goal: Fluid Balance  Outcome: Ongoing, Progressing     Problem: Functional Decline (Chronic Kidney Disease)  Goal: Optimal Functional Ability  Outcome: Ongoing, Progressing  Intervention: Optimize Functional Ability  Recent Flowsheet Documentation  Taken 4/22/2024 1940 by Betsy Palm RN  Activity Management:   ambulated in room   back to bed   up to bedside commode   activity encouraged     Problem: Hematologic Alteration (Chronic Kidney Disease)  Goal: Absence of Anemia Signs and Symptoms  Outcome: Ongoing, Progressing  Intervention: Manage Signs of Anemia and Bleeding  Recent Flowsheet Documentation  Taken 4/22/2024 1940 by Betsy Palm RN  Environmental Support: calm environment promoted     Problem: Oral Intake Inadequate (Chronic Kidney Disease)  Goal: Optimal Oral Intake  Outcome: Ongoing, Progressing     Problem: Pain (Chronic Kidney Disease)  Goal: Acceptable Pain  Control  Outcome: Ongoing, Progressing  Intervention: Prevent or Manage Pain  Recent Flowsheet Documentation  Taken 4/22/2024 1940 by Betsy Palm RN  Pain Management Interventions:   quiet environment facilitated   position adjusted   pain management plan reviewed with patient/caregiver   care clustered     Problem: Renal Function Impairment (Chronic Kidney Disease)  Goal: Minimize Renal Failure Effects  Outcome: Ongoing, Progressing  Intervention: Protect and Monitor Dialysis Access Site  Recent Flowsheet Documentation  Taken 4/23/2024 0500 by Betsy Palm RN  Safety Precautions: emergency equipment at bedside  Taken 4/23/2024 0300 by Betsy Palm RN  Safety Precautions: emergency equipment at bedside  Taken 4/23/2024 0100 by Betsy Palm RN  Safety Precautions: emergency equipment at bedside  Taken 4/22/2024 2300 by Betsy Palm RN  Safety Precautions: emergency equipment at bedside  Taken 4/22/2024 2100 by Betsy Palm RN  Safety Precautions: emergency equipment at bedside  Taken 4/22/2024 1940 by Betsy Palm RN  Safety Precautions: emergency equipment at bedside     Problem: Device-Related Complication Risk (Hemodialysis)  Goal: Safe, Effective Therapy Delivery  Outcome: Ongoing, Progressing     Problem: Hemodynamic Instability (Hemodialysis)  Goal: Effective Tissue Perfusion  Outcome: Ongoing, Progressing     Problem: Infection (Hemodialysis)  Goal: Absence of Infection Signs and Symptoms  Outcome: Ongoing, Progressing   Goal Outcome Evaluation:  Plan of Care Reviewed With: patient            Pt is alert and orient x4.  VS WNL for pt.  Insulin adm as ordered.  PRN tylenol adm for headache with good effect.

## 2024-04-23 NOTE — CONSULTS
Cardiology Consult Note  [address]          Patient Identification:  Charlette Rai      [id number]  89 y.o.        female  1935           Reason for Consultation: CHF    PCP: Rafael Lyons MD    History of Present Illness:     Patient is a 89-year-old with COPD, chronic renal insufficiency stage III, type 2 diabetes, essential hypertension, HFpEF, and moderate aortic stenosis who has had several admissions over the last few months for respiratory failure on several of those occasions CHF and COPD exacerbations.  She was most recently discharged on the 15th after being treated for volume overload.Plan she came in due to worsening shortness of breath cough and congestion but no fever or chills.  She denies any chest discomfort issues.    Past History:  Past Medical History:   Diagnosis Date    Allergic rhinitis     Anemia     Arthritis     Asthma     USE INHALERS AND NEBULIZERS    Back pain     Bladder disorder     Cancer     LEFT LUNG CANCER 2005 SURGERY AND CHEMO DONE  AND CURRENTLY 4/ 14/22  RIGHT LUNG CANCER HAS ONLY RECEIVED RADIATION THUS FAR    Chronic kidney disease     stage 3    CKD (chronic kidney disease) stage 4, GFR 15-29 ml/min     Condition not found     ulcer    Congestive heart failure (CHF)     FOLLOWED BY DR AQUINO. DENIES CP BUT DOES HAVE SOA CHRONIC ISSUE COPD/LUNG CANCER    COPD (chronic obstructive pulmonary disease)     FOLLOWED BY DR MARIAJOSE BAILEY    Coronary artery disease     DENIES CP BUT DOES GET SOA MOST OF THE TIME WITH EXERTION BUT OCC AT REST CHRONIC ISSUE COPD/LUNG CANCER    Deep vein thrombosis     Diabetes mellitus     DOES NOT CHECK BS DAILY    Disease of thyroid gland     HYPOTHYROIDISM    Essential hypertension     Gastric ulcer     GERD (gastroesophageal reflux disease)     Heart murmur     History of transfusion     NO ISSUES POST TRANSFUSION WAS MANY YEARS AGO    Hyperlipemia     Leukocytopenia     FOLLOWED BY DR MIR BAILEY    Limb swelling      Lumbago     Lumbar spinal stenosis     Lung cancer     Migraine headache     Multiple joint pain     On home oxygen therapy     3L/NC PRN    Osteopenia     Reflux esophagitis     Shortness of breath     Thyroid nodule     HAS ULTRASOUND YEARLY BEING MONITORED    Vascular disease     Vitamin D deficiency      Past Surgical History:   Procedure Laterality Date    ABDOMINAL SURGERY      APPENDECTOMY      ARTERIOVENOUS FISTULA/SHUNT SURGERY Left 05/10/2022    Procedure: Left basilic vein transposition;  Surgeon: Wan Barksdale MD;  Location: [facility] MAIN OR;  Service: Vascular;  Laterality: Left;    ARTERIOVENOUS FISTULA/SHUNT SURGERY Left 3/23/2023    Procedure: Ligation of left arm arteriovenous fistula;  Surgeon: Wan Barksdale MD;  Location: [facility] MAIN OR;  Service: Vascular;  Laterality: Left;    ARTERIOVENOUS FISTULA/SHUNT SURGERY Left 2023    Procedure: Creation of left arm arteriovenous graft;  Surgeon: Wan Barksdale MD;  Location: [facility] MAIN OR;  Service: Vascular;  Laterality: Left;    CARDIAC CATHETERIZATION      CARDIAC SURGERY      CARDIAC SURGERY      fluid drained from heart    CATARACT EXTRACTION, BILATERAL      COLONOSCOPY      ENDOSCOPY  2016    FEMORAL ARTERY STENT Bilateral     HYSTERECTOMY      LUNG BIOPSY Left     lobectomy upper lung caner    LUNG VOLUME REDUCTION      OTHER SURGICAL HISTORY      artifical joints/limbs    REPLACEMENT TOTAL KNEE Left     UPPER GASTROINTESTINAL ENDOSCOPY       No Known Allergies  Social History     Socioeconomic History    Marital status:    Tobacco Use    Smoking status: Former     Current packs/day: 0.00     Average packs/day: 1 pack/day for 52.5 years (52.5 ttl pk-yrs)     Types: Cigarettes     Start date:      Quit date: 2004     Years since quittin.8     Passive exposure: Past    Smokeless tobacco: Never   Vaping Use    Vaping status: Never Used   Substance and Sexual Activity    Alcohol use: Never     Drug use: Never    Sexual activity: Defer     Family History   Problem Relation Age of Onset    Arthritis Mother     Arthritis Father     Cancer Brother     Diabetes Brother     Other Brother         blood disease     Prostate cancer Brother     Cancer Brother     Prostate cancer Brother     Cancer Brother     Cancer Brother     Malig Hyperthermia Neg Hx     Colon cancer Neg Hx        Medications:  Prior to Admission medications    Medication Sig Start Date End Date Taking? Authorizing Provider   albuterol sulfate  (90 Base) MCG/ACT inhaler Inhale 2 puffs Every 4 (Four) Hours As Needed for Wheezing.   Yes Sarah Parker MD   budesonide-formoterol (SYMBICORT) 160-4.5 MCG/ACT inhaler Inhale 2 puffs 2 (Two) Times a Day.   Yes ProviderSarah MD   carvedilol (Coreg) 25 MG tablet Take 1 tablet by mouth 2 (Two) Times a Day With Meals for 30 days. 11/5/23 4/21/24 Yes Patrick Turcios MD   dexlansoprazole (DEXILANT) 60 MG capsule Take 1 capsule by mouth Daily. 9/23/22  Yes ProviderSarah MD   doxycycline (MONODOX) 100 MG capsule Take 1 capsule by mouth 2 (Two) Times a Day for 7 days. 4/17/24 4/24/24 Yes Ghassan Torres MD   ergocalciferol (ERGOCALCIFEROL) 1.25 MG (67480 UT) capsule Take 1 capsule by mouth Every 14 (Fourteen) Days. 3/4/22  Yes Emergency, Nurse Emy, RN   hydrALAZINE (APRESOLINE) 25 MG tablet Take 1 tablet by mouth Every 12 (Twelve) Hours. 3/9/24  Yes Emily Chandra MD   levocetirizine (XYZAL) 5 MG tablet TAKE 1 TABLET DAILY  Patient taking differently: Take 1 tablet by mouth Every Evening. 6/23/23  Yes Paloma James MD   linagliptin (Tradjenta) 5 MG tablet tablet Take 1 tablet by mouth Daily. 1/10/22  Yes Paloma James MD   polycarbophil (calcium polycarbophil) 625 MG tablet tablet Take 1 tablet by mouth Daily As Needed.   Yes ProviderSarah MD   rosuvastatin (CRESTOR) 20 MG tablet Take 1 tablet by mouth Daily. 7/7/23  Yes León Sanchez MD    sevelamer (RENVELA) 800 MG tablet Take 1 tablet by mouth 3 (Three) Times a Day With Meals. 10/9/23  Yes ProviderSarah MD   traMADol (ULTRAM) 50 MG tablet Take 1 tablet by mouth Every 8 (Eight) Hours As Needed for Moderate Pain for up to 30 days. 4/3/24 5/3/24 Yes Rafael Lyons MD   nitroglycerin (NITROSTAT) 0.4 MG SL tablet Place 1 tablet under the tongue Every 5 (Five) Minutes As Needed for Chest Pain.    ProviderSarah MD   Trulicity 1.5 MG/0.5ML solution pen-injector Inject 1.5 mg under the skin into the appropriate area as directed Every 14 (Fourteen) Days.    ProviderSarah MD      Current medications:  arformoterol, 15 mcg, Nebulization, BID - RT  azithromycin, 250 mg, Oral, Q24H  budesonide, 0.5 mg, Nebulization, BID - RT  carvedilol, 25 mg, Oral, BID With Meals  [START ON 4/29/2024] ergocalciferol, 50,000 Units, Oral, Q14 Days  famotidine, 20 mg, Oral, Daily  hydrALAZINE, 25 mg, Oral, Q12H  insulin lispro, 2-9 Units, Subcutaneous, 4x Daily AC & at Bedtime  methylPREDNISolone sodium succinate, 62.5 mg, Intravenous, Daily  multivitamin with minerals, 1 tablet, Oral, Daily  rosuvastatin, 20 mg, Oral, Nightly  sevelamer, 800 mg, Oral, TID With Meals  sodium chloride, 10 mL, Intravenous, Q12H      Current IV drips:       Review of Systems   Constitutional: Negative for chills, fever and weight loss.   HENT:  Negative for congestion and nosebleeds.    Cardiovascular:  Negative for orthopnea and paroxysmal nocturnal dyspnea.   Respiratory:  Positive for cough and shortness of breath.    Endocrine: Negative for cold intolerance and heat intolerance.   Skin:  Negative for rash.   Musculoskeletal:  Negative for back pain and muscle weakness.   Gastrointestinal:  Negative for abdominal pain, nausea and vomiting.   Genitourinary:  Negative for dysuria and nocturia.   Neurological:  Negative for dizziness and light-headedness.   Psychiatric/Behavioral:  Negative for altered mental status and  "hallucinations.          Physical Exam    /74 (BP Location: Right arm, Patient Position: Lying)   Pulse 85   Temp 97.5 °F (36.4 °C) (Oral)   Resp 22   Ht 165.1 cm (65\")   Wt 61.3 kg (135 lb 2.3 oz)   LMP  (LMP Unknown)   SpO2 100%   BMI 22.49 kg/m²  Body mass index is 22.49 kg/m².   Oxygen saturation   @FLOWAN(10::1)@ SpO2  Min: 96 %  Max: 100 %    General Appearance:   no acute distress  Alert and oriented x3  HENT:   lips not cyanotic  Atraumatic  Neck:  thyroid not enlarged  supple  Respiratory:  no respiratory distress  normal breath sounds  no rales  Cardiovascular:  no jugular venous distention  regular rhythm  apical impulse normal  S1 normal, S2 normal  no S3, no S4   2/6systolic murmur  no rub, no thrill  no carotid bruit  pedal pulses normal  lower extremity edema: none    Gastrointestinal:   bowel sounds normal  non-tender  no hepatomegaly, no splenomegaly  Musculoskeletal:  no clubbing of fingers.   normocephalic, head atraumatic  Skin:   warm, dry  No rashes  Neuro/Psychiatric:  normal mood and affect  judgement and insight appropriate      Cardiographics:     ECG  (personally reviewed)       Results for orders placed during the hospital encounter of 10/29/23    Adult Transthoracic Echo Complete W/ Cont if Necessary Per Protocol    Interpretation Summary    Left ventricular systolic function is normal. Calculated left ventricular EF = 56.8%    Left ventricular wall thickness is consistent with mild asymmetric hypertrophy.    Left ventricular diastolic function is consistent with (grade I) impaired relaxation.    Left atrial volume is moderately increased.    Moderate aortic valve stenosis is present.    Estimated right ventricular systolic pressure from tricuspid regurgitation is normal (<35 mmHg).    Mild dilation of the aortic root is present.         No results found for this or any previous visit.     Cardiolite (Tc-99m Sestamibi) stress test                  Lab Review:       CBC  " "        4/21/2024    13:50 4/22/2024    05:18 4/23/2024    05:52   CBC   WBC 7.66  5.25  9.76    RBC 2.80  2.73  2.71    Hemoglobin 8.5  8.3  8.1    Hematocrit 28.2  27.3  26.8    .7  100.0  98.9    MCH 30.4  30.4  29.9    MCHC 30.1  30.4  30.2    RDW 14.7  14.7  15.2    Platelets 150  152  183        CMP          4/21/2024    13:50 4/22/2024    05:18 4/23/2024    05:52   CMP   Glucose 148  239  110    BUN 48  55  33    Creatinine 4.21  4.05  3.11    EGFR 9.7  10.2  14.0    Sodium 138  139  136    Potassium 4.2  4.2  4.2    Chloride 100  102  101    Calcium 7.9  7.9  8.3    Total Protein 5.8      Albumin 3.3   3.3    Globulin 2.5      Total Bilirubin 0.3      Alkaline Phosphatase 164      AST (SGOT) 12      ALT (SGPT) 10      Albumin/Globulin Ratio 1.3      BUN/Creatinine Ratio 11.4  13.6  10.6    Anion Gap 12.7  14.9  11.7         CARDIAC LABS:      Lab 04/21/24  1552 04/21/24  1350 04/17/24 2213 04/17/24 1940   PROBNP  --  7,318.0*  --  13,701.0*   HSTROP T 66* 68* 64* 68*      No results found for: \"DIGOXIN\"   Lab Results   Component Value Date    TSH 1.650 10/29/2023           Invalid input(s): \"LDLCALC\"  No results found for: \"POCTROP\"  No results found for: \"DDIMERQUAN\"  Lab Results   Component Value Date    MG 1.8 04/22/2024             CARDIAC LABS:      Lab 04/21/24  1552 04/21/24  1350 04/17/24 2213 04/17/24 1940   PROBNP  --  7,318.0*  --  13,701.0*   HSTROP T 66* 68* 64* 68*        Imaging:  CXR  Narrative & Impression   XR CHEST 1 VW-     Date of Exam: 4/21/2024 1:43 PM     Indication: SOA Triage Protocol     Comparison: 4/17/2024     Findings:  Postsurgical changes in the left hemithorax with shift of mediastinal  structures from right to left appear stable.     Linear opacity in the right upper lobe is stable.     Right jugular central venous catheter is in unchanged position, the tip  is in the mid SVC.     Bony structures appear intact.     IMPRESSION:  Impression:  No significant change " in comparison to 4/17/2024, as above.        Electronically Signed By-SAVI LUCERO MD On:4/21/2024 1:48 PM           Assessment:    Acute exacerbation of CHF (congestive heart failure)    Essential hypertension    Chronic obstructive pulmonary disease with acute exacerbation    Anemia of chronic disease    COPD exacerbation    ESRD (end stage renal disease)      HFpEF acute on chronic patient currently undergoing hemodialysis for volume management not much further GDMT given her current renal function issues recommend the addition of isosorbide dinitrate to her hydralazine for afterload and preload reduction    Elevated troponin secondary to type II MI     Plan:  1.  Isosorbide dinitrate 20 mg 3 times daily  2.  Hydralazine 25 twice daily  3.  Coreg 25 twice daily  4.  Hemodialysis for volume management      Thank you for allowing us to share in [facility].            León Sanchez MD   4/23/2024    09:12 EDT

## 2024-04-23 NOTE — PLAN OF CARE
Goal Outcome Evaluation:  Plan of Care Reviewed With: patient   Pt vss. Pt has no complaints of pain during this shift. Pt required supplemental insulin once this shift. Plan to be NPO at midnight for procedure tomorrow. Continuing with plan of care.

## 2024-04-23 NOTE — ANESTHESIA PREPROCEDURE EVALUATION
Anesthesia Evaluation     Patient summary reviewed and Nursing notes reviewed   no history of anesthetic complications:   NPO Solid Status: > 8 hours  NPO Liquid Status: > 8 hours           Airway   Mallampati: II  TM distance: >3 FB  Neck ROM: full  No difficulty expected  Dental - normal exam     Pulmonary - normal exam    breath sounds clear to auscultation  (+) lung cancer (2005 s/p chemo xrt Left upper lobe), COPD moderate, asthma,home oxygen (3lpm NC), shortness of breath  Cardiovascular   Exercise tolerance: poor (<4 METS)    ECG reviewed  PT is on anticoagulation therapy  Rhythm: regular  Rate: abnormal    (+) hypertension, valvular problems/murmurs murmur and AS, CAD, CHF , murmur, PVD, DVT resolved, hyperlipidemia,  carotid artery disease carotid bilateral    ROS comment: 2019 echo EF 60% mild fibrocalcific MV and AV    Neuro/Psych  (+) headaches, numbness  GI/Hepatic/Renal/Endo    (+) GERD well controlled, PUD, renal disease- CRI, ESRD and dialysis, diabetes mellitus type 2, thyroid problem hypothyroidism and thyroid nodules    Musculoskeletal     (+) back pain  Abdominal    Substance History - negative use     OB/GYN negative ob/gyn ROS         Other   arthritis,   history of cancer remission    ROS/Med Hx Other: ECHO Interpretation Summary 10/31/23  ·  Left ventricular systolic function is normal. Calculated left ventricular EF = 56.8%  ·  Left ventricular wall thickness is consistent with mild asymmetric hypertrophy.  ·  Left ventricular diastolic function is consistent with (grade I) impaired relaxation.  ·  Left atrial volume is moderately increased.  ·  Moderate aortic valve stenosis is present.  ·  Estimated right ventricular systolic pressure from tricuspid regurgitation is normal (<35 mmHg).  ·  Mild dilation of the aortic root is present.    EKG 04/21/24: HR 80,   Sinus rhythm  Prolonged WV interval  Probable left atrial enlargement  Probable LVH with secondary repol abnrm  Anterior Q waves,  possibly due to LVH  Prolonged QT interval     Currently receives dialysis on M, W, F ( just finished dialysis today)  Labs:   04/24/24 06:03  BUN: 49 (H)  Creatinine: 3.77 (H)  BUN/Creatinine Ratio: 13.0  eGFR: 11.1 (L)  Glucose: 105 (H)  Calcium: 7.9 (L)    Heparin SQ last on 04/24    Ct chest 04/21/24:   Interval placement of a right IJ tunneled dialysis catheter (TDC) is  noted since 3/13/2024, as discussed.   There has been interval decrease in (if not complete resolution of) the  small right pleural effusion since 3/13/2024.   There is an air-fluid level in the right mainstem bronchus. It may  represent mucus. Aspirated content cannot be excluded.   No definite acute infiltrate.   Otherwise, no significant interval change is appreciated since the prior  exam from 3/13/2024.    Last dose Trulicity 4/10          Phys Exam Other: 100% on 3 lpm via Nc    Right IJ Tunnel Dialysis Catheter                   Anesthesia Plan    ASA 4     MAC and general   total IV anesthesia  (Total IV Anesthesia    Patient understands anesthesia not responsible for dental damage.  )  intravenous induction     Anesthetic plan, risks, benefits, and alternatives have been provided, discussed and informed consent has been obtained with: patient.  Pre-procedure education provided  Plan discussed with CRNA.

## 2024-04-24 ENCOUNTER — ANESTHESIA (OUTPATIENT)
Dept: GASTROENTEROLOGY | Facility: HOSPITAL | Age: 89
End: 2024-04-24
Payer: MEDICARE

## 2024-04-24 LAB
ANION GAP SERPL CALCULATED.3IONS-SCNC: 13.2 MMOL/L (ref 5–15)
BUN SERPL-MCNC: 49 MG/DL (ref 8–23)
BUN/CREAT SERPL: 13 (ref 7–25)
CALCIUM SPEC-SCNC: 7.9 MG/DL (ref 8.6–10.5)
CHLORIDE SERPL-SCNC: 100 MMOL/L (ref 98–107)
CO2 SERPL-SCNC: 22.8 MMOL/L (ref 22–29)
CREAT SERPL-MCNC: 3.77 MG/DL (ref 0.57–1)
EGFRCR SERPLBLD CKD-EPI 2021: 11.1 ML/MIN/1.73
GLUCOSE BLDC GLUCOMTR-MCNC: 104 MG/DL (ref 70–99)
GLUCOSE BLDC GLUCOMTR-MCNC: 87 MG/DL (ref 70–99)
GLUCOSE BLDC GLUCOMTR-MCNC: 93 MG/DL (ref 70–99)
GLUCOSE SERPL-MCNC: 105 MG/DL (ref 65–99)
LYMPHOCYTES NFR FLD MANUAL: 2 %
MACROPHAGE FLUID: 4 %
NEUTROPHILS NFR FLD MANUAL: 94 %
POTASSIUM SERPL-SCNC: 4.1 MMOL/L (ref 3.5–5.2)
SODIUM SERPL-SCNC: 136 MMOL/L (ref 136–145)
VISUAL PRESENCE OF BLOOD: NORMAL
WHOLE BLOOD HOLD SPECIMEN: NORMAL

## 2024-04-24 PROCEDURE — 0BC68ZZ EXTIRPATION OF MATTER FROM RIGHT LOWER LOBE BRONCHUS, VIA NATURAL OR ARTIFICIAL OPENING ENDOSCOPIC: ICD-10-PCS | Performed by: INTERNAL MEDICINE

## 2024-04-24 PROCEDURE — 25010000002 PROPOFOL 10 MG/ML EMULSION

## 2024-04-24 PROCEDURE — 87205 SMEAR GRAM STAIN: CPT | Performed by: INTERNAL MEDICINE

## 2024-04-24 PROCEDURE — 87116 MYCOBACTERIA CULTURE: CPT | Performed by: INTERNAL MEDICINE

## 2024-04-24 PROCEDURE — 80048 BASIC METABOLIC PNL TOTAL CA: CPT | Performed by: INTERNAL MEDICINE

## 2024-04-24 PROCEDURE — 25810000003 SODIUM CHLORIDE 0.9 % SOLUTION

## 2024-04-24 PROCEDURE — 87071 CULTURE AEROBIC QUANT OTHER: CPT | Performed by: INTERNAL MEDICINE

## 2024-04-24 PROCEDURE — 94799 UNLISTED PULMONARY SVC/PX: CPT

## 2024-04-24 PROCEDURE — 87102 FUNGUS ISOLATION CULTURE: CPT | Performed by: INTERNAL MEDICINE

## 2024-04-24 PROCEDURE — 87633 RESP VIRUS 12-25 TARGETS: CPT | Performed by: INTERNAL MEDICINE

## 2024-04-24 PROCEDURE — 87070 CULTURE OTHR SPECIMN AEROBIC: CPT | Performed by: INTERNAL MEDICINE

## 2024-04-24 PROCEDURE — 94664 DEMO&/EVAL PT USE INHALER: CPT

## 2024-04-24 PROCEDURE — 99233 SBSQ HOSP IP/OBS HIGH 50: CPT | Performed by: INTERNAL MEDICINE

## 2024-04-24 PROCEDURE — 0B9J8ZX DRAINAGE OF LEFT LOWER LUNG LOBE, VIA NATURAL OR ARTIFICIAL OPENING ENDOSCOPIC, DIAGNOSTIC: ICD-10-PCS | Performed by: INTERNAL MEDICINE

## 2024-04-24 PROCEDURE — 0BCB8ZZ EXTIRPATION OF MATTER FROM LEFT LOWER LOBE BRONCHUS, VIA NATURAL OR ARTIFICIAL OPENING ENDOSCOPIC: ICD-10-PCS | Performed by: INTERNAL MEDICINE

## 2024-04-24 PROCEDURE — 25010000002 HEPARIN (PORCINE) PER 1000 UNITS: Performed by: INTERNAL MEDICINE

## 2024-04-24 PROCEDURE — 88108 CYTOPATH CONCENTRATE TECH: CPT | Performed by: INTERNAL MEDICINE

## 2024-04-24 PROCEDURE — 3E1F88Z IRRIGATION OF RESPIRATORY TRACT USING IRRIGATING SUBSTANCE, VIA NATURAL OR ARTIFICIAL OPENING ENDOSCOPIC: ICD-10-PCS | Performed by: INTERNAL MEDICINE

## 2024-04-24 PROCEDURE — 82948 REAGENT STRIP/BLOOD GLUCOSE: CPT | Performed by: MARRIAGE & FAMILY THERAPIST

## 2024-04-24 PROCEDURE — 87206 SMEAR FLUORESCENT/ACID STAI: CPT | Performed by: INTERNAL MEDICINE

## 2024-04-24 PROCEDURE — 0BC18ZZ EXTIRPATION OF MATTER FROM TRACHEA, VIA NATURAL OR ARTIFICIAL OPENING ENDOSCOPIC: ICD-10-PCS | Performed by: INTERNAL MEDICINE

## 2024-04-24 PROCEDURE — 0BC88ZZ EXTIRPATION OF MATTER FROM LEFT UPPER LOBE BRONCHUS, VIA NATURAL OR ARTIFICIAL OPENING ENDOSCOPIC: ICD-10-PCS | Performed by: INTERNAL MEDICINE

## 2024-04-24 PROCEDURE — 82948 REAGENT STRIP/BLOOD GLUCOSE: CPT

## 2024-04-24 PROCEDURE — 31624 DX BRONCHOSCOPE/LAVAGE: CPT | Performed by: INTERNAL MEDICINE

## 2024-04-24 PROCEDURE — 89051 BODY FLUID CELL COUNT: CPT | Performed by: INTERNAL MEDICINE

## 2024-04-24 PROCEDURE — 0BC48ZZ EXTIRPATION OF MATTER FROM RIGHT UPPER LOBE BRONCHUS, VIA NATURAL OR ARTIFICIAL OPENING ENDOSCOPIC: ICD-10-PCS | Performed by: INTERNAL MEDICINE

## 2024-04-24 PROCEDURE — 31645 BRNCHSC W/THER ASPIR 1ST: CPT | Performed by: INTERNAL MEDICINE

## 2024-04-24 RX ORDER — IPRATROPIUM BROMIDE AND ALBUTEROL SULFATE 2.5; .5 MG/3ML; MG/3ML
3 SOLUTION RESPIRATORY (INHALATION) ONCE
Status: COMPLETED | OUTPATIENT
Start: 2024-04-24 | End: 2024-04-27

## 2024-04-24 RX ORDER — SODIUM CHLORIDE 9 MG/ML
50 INJECTION, SOLUTION INTRAVENOUS CONTINUOUS
Status: CANCELLED | OUTPATIENT
Start: 2024-04-24

## 2024-04-24 RX ORDER — PROPOFOL 10 MG/ML
VIAL (ML) INTRAVENOUS AS NEEDED
Status: DISCONTINUED | OUTPATIENT
Start: 2024-04-24 | End: 2024-04-24 | Stop reason: SURG

## 2024-04-24 RX ORDER — LIDOCAINE HYDROCHLORIDE 40 MG/ML
INJECTION, SOLUTION RETROBULBAR AS NEEDED
Status: DISCONTINUED | OUTPATIENT
Start: 2024-04-24 | End: 2024-04-24 | Stop reason: HOSPADM

## 2024-04-24 RX ORDER — LIDOCAINE HYDROCHLORIDE 20 MG/ML
INJECTION, SOLUTION EPIDURAL; INFILTRATION; INTRACAUDAL; PERINEURAL AS NEEDED
Status: DISCONTINUED | OUTPATIENT
Start: 2024-04-24 | End: 2024-04-24 | Stop reason: SURG

## 2024-04-24 RX ORDER — SODIUM CHLORIDE 9 MG/ML
50 INJECTION, SOLUTION INTRAVENOUS CONTINUOUS
Status: DISCONTINUED | OUTPATIENT
Start: 2024-04-24 | End: 2024-04-26

## 2024-04-24 RX ORDER — SODIUM CHLORIDE, SODIUM LACTATE, POTASSIUM CHLORIDE, CALCIUM CHLORIDE 600; 310; 30; 20 MG/100ML; MG/100ML; MG/100ML; MG/100ML
30 INJECTION, SOLUTION INTRAVENOUS CONTINUOUS
Status: DISCONTINUED | OUTPATIENT
Start: 2024-04-24 | End: 2024-04-26

## 2024-04-24 RX ADMIN — ISOSORBIDE DINITRATE 20 MG: 20 TABLET ORAL at 18:11

## 2024-04-24 RX ADMIN — PROPOFOL 100 MCG/KG/MIN: 10 INJECTION, EMULSION INTRAVENOUS at 15:09

## 2024-04-24 RX ADMIN — PROPOFOL 30 MG: 10 INJECTION, EMULSION INTRAVENOUS at 15:09

## 2024-04-24 RX ADMIN — Medication 10 ML: at 22:02

## 2024-04-24 RX ADMIN — SODIUM CHLORIDE 50 ML/HR: 9 INJECTION, SOLUTION INTRAVENOUS at 14:44

## 2024-04-24 RX ADMIN — ROSUVASTATIN CALCIUM 20 MG: 20 TABLET, FILM COATED ORAL at 22:02

## 2024-04-24 RX ADMIN — SEVELAMER CARBONATE 800 MG: 800 TABLET, FILM COATED ORAL at 18:11

## 2024-04-24 RX ADMIN — BUDESONIDE 0.5 MG: 0.5 SUSPENSION RESPIRATORY (INHALATION) at 19:41

## 2024-04-24 RX ADMIN — HEPARIN SODIUM 3400 UNITS: 1000 INJECTION INTRAVENOUS; SUBCUTANEOUS at 11:54

## 2024-04-24 RX ADMIN — ARFORMOTEROL TARTRATE 15 MCG: 15 SOLUTION RESPIRATORY (INHALATION) at 06:34

## 2024-04-24 RX ADMIN — Medication 10 ML: at 16:40

## 2024-04-24 RX ADMIN — IPRATROPIUM BROMIDE AND ALBUTEROL SULFATE 3 ML: .5; 3 SOLUTION RESPIRATORY (INHALATION) at 15:46

## 2024-04-24 RX ADMIN — BUDESONIDE 0.5 MG: 0.5 SUSPENSION RESPIRATORY (INHALATION) at 06:34

## 2024-04-24 RX ADMIN — IPRATROPIUM BROMIDE AND ALBUTEROL SULFATE 3 ML: .5; 3 SOLUTION RESPIRATORY (INHALATION) at 06:34

## 2024-04-24 RX ADMIN — ARFORMOTEROL TARTRATE 15 MCG: 15 SOLUTION RESPIRATORY (INHALATION) at 19:41

## 2024-04-24 RX ADMIN — LIDOCAINE HYDROCHLORIDE 40 MG: 20 INJECTION, SOLUTION INTRAVENOUS at 15:09

## 2024-04-24 NOTE — PROGRESS NOTES
[facility]     Progress Note    Patient Name: Charlette Rai  : 1935  MRN: [id number]  Primary Care Physician:  Rafael Lyons MD  Date of admission: 2024      Subjective   Brief summary.  Patient admitted with increasing shortness of breath      HPI:  Seen earlier this morning before [facility].  Awake and alert, comfortable, reading newspaper, getting dialyzed.  No chest pain, some chest congestion.      Review of Systems     No fever or chills, no chest pain, weakness and shortness of breath and cough and congestion.  Sugars doing better.  No hypoglycemia.      Objective     Vitals:   Temp:  [97.5 °F (36.4 °C)-98.7 °F (37.1 °C)] 98 °F (36.7 °C)  Heart Rate:  [65-85] 80  Resp:  [14-27] 19  BP: ()/(47-66) 108/58  Flow (L/min):  [2-5] 5    Physical Exam :     Elderly female not in respiratory distress.  Heart regular.  Lungs diffuse rhonchi.  Congested.  Abdomen soft.  Nontender.  Neurologically awake alert and oriented.  Extremities no edema.     Result Review:  I have personally reviewed the results from the time of this admission to 2024 19:24 EDT and agree with these findings:  [x]  Laboratory  []  Microbiology  [x]  Radiology  []  EKG/Telemetry   []  Cardiology/Vascular   []  Pathology  []  Old records  []  Other:           Assessment / Plan       Active Hospital Problems:  Active Hospital Problems    Diagnosis     ESRD (end stage renal disease)     COPD exacerbation     COPD with acute exacerbation     ESRD (end stage renal disease)     Anemia of chronic disease     Essential hypertension     Acute on chronic heart failure with preserved ejection fraction (HFpEF)     Chronic obstructive pulmonary disease with acute exacerbation        Plan:   Continue to improve, for bronchoscopy today.  Cardiac meds being adjusted added hydralazine and isosorbide.  Continue dialysis.  Possible discharge in 1 to 2 days    DVT prophylaxis:  Medical and mechanical DVT prophylaxis orders are  present.        CODE STATUS:   Code Status (Patient has no pulse and is not breathing): CPR (Attempt to Resuscitate)  Medical Interventions (Patient has pulse or is breathing): Full Support            Electronically signed by Rafael Lyons MD, 04/24/24, 7:25 PM EDT.        Addendum discussed with pulmonologist patient has a lot of mucus plugging, bronchoscopy successful and large amount of mucus and mucous plugs were removed     Electronically signed by Rafael Lyons MD, 04/24/24, 7:25 PM EDT.

## 2024-04-24 NOTE — ANESTHESIA POSTPROCEDURE EVALUATION
Patient: Charlette Rai    Procedure Summary       Date: 04/24/24 Room / Location: [facility] ENDOSCOPY 3 / [facility] ENDOSCOPY    Anesthesia Start: 1503 Anesthesia Stop: 1527    Procedure: BRONCHOSCOPY WITH BAL AND WASHINGS ([place]) Diagnosis:       ESRD (end stage renal disease)      COPD exacerbation      COPD with acute exacerbation      (ESRD (end stage renal disease) [N18.6])      (COPD exacerbation [J44.1])      (COPD with acute exacerbation [J44.1])    Surgeons: Khalif Yanez MD Provider: Tammy Lim CRNA    Anesthesia Type: MAC, general ASA Status: 4            Anesthesia Type: MAC, general    Vitals  Vitals Value Taken Time   /58 04/24/24 1550   Temp 36.7 °C (98 °F) 04/24/24 1540   Pulse 80 04/24/24 1551   Resp 19 04/24/24 1545   SpO2 100 % 04/24/24 1551   Vitals shown include unfiled device data.        Post Anesthesia Care and Evaluation    Post-procedure mental status: acceptable.  Pain management: satisfactory to patient    Airway patency: patent  Anesthetic complications: No anesthetic complications    Cardiovascular status: acceptable  Respiratory status: acceptable    Comments: Per chart review

## 2024-04-24 NOTE — PROGRESS NOTES
Pulmonary / Critical Care Progress Note      Patient Name: Charlette Rai  : 1935  MRN: [id number]  Primary Care Physician:  Rafael Lyons MD  Date of admission: 2024    Subjective   Subjective   Follow-up for persistent bronchitis, respiratory failure, failure with airway clearance, ESRD on dialysis.    Over past 24 hours: Continued to have difficulty with airway clearance.  Continued with nebulizers, incentive spirometer and flutter valve.    No acute events overnight.     This morning,   Having dialysis.  Has mucous plug right bronchus intermedius.  Continues to feel short of breath.  Has difficulty with clearing secretions.  No nausea or vomiting.  No fever or chills.  Agreeable for bronchoscopy.    Review of Systems  General:  Fatigue, No Fever  HEENT: No dysphagia, No Visual Changes, no rhinorrhea  Respiratory:  + cough,+Dyspnea, no phlegm, No Pleuritic Pain, + wheezing, no hemoptysis  Cardiovascular: Denies chest pain, denies palpitations,+HAIDER, No Chest Pressure  Gastrointestinal:  No Abdominal Pain, No Nausea, No Vomiting, No Diarrhea  Genitourinary:  No Dysuria, No Frequency, No Hesitancy  Musculoskeletal: No muscle pain or swelling  Endocrine:  No Heat Intolerance, No Cold Intolerance  Neurologic:  No Confusion, no Dysarthria, No Headaches  Skin:  No Rash, No Open Wounds          Objective   Objective     Vitals:   Temp:  [97.5 °F (36.4 °C)-98.7 °F (37.1 °C)] 98.7 °F (37.1 °C)  Heart Rate:  [71-81] 71  Resp:  [18-20] 18  BP: (152-172)/(47-54) 162/54  Flow (L/min):  [2-3] 2  Physical Exam   Vital Signs Reviewed   General:  WDWN, Alert, mild distress, has conversational dyspnea.    HEENT:  PERRL, EOMI.  OP, nares clear, no sinus tenderness  Neck:  Supple, no JVD, no thyromegaly  Lymph: no axillary, cervical, supraclavicular lymphadenopathy noted bilaterally  Chest: Poor aeration, minimal scattered rhonchi, no wheezing, bibasilar crackles, resonant to percussion bilaterally, TDC in place  CV:  RRR, no MGR, pulses 2+, equal.  Abd:  Soft, NT, ND, + BS, no HSM  EXT:  no clubbing, no cyanosis, trace lower extremity edema, no joint tenderness  Neuro:  A&Ox3, CN grossly intact, no focal deficits.  Skin: No rashes or lesions noted      Result Review    Result Review:  I have personally reviewed the results from the time of this admission to 4/24/2024 07:57 EDT and agree with these findings:  [x]  Laboratory  [x]  Microbiology  [x]  Radiology  [x]  EKG/Telemetry   [x]  Cardiology/Vascular   []  Pathology  []  Old records  []  Other:  Most notable findings include:         Lab 04/24/24  0603 04/23/24  0552 04/22/24  0518 04/21/24  1350 04/17/24  1940   WBC  --  9.76 5.25 7.66 9.84   HEMOGLOBIN  --  8.1* 8.3* 8.5* 9.9*   HEMATOCRIT  --  26.8* 27.3* 28.2* 33.0*   PLATELETS  --  183 152 150 112*   SODIUM 136 136 139 138 136   POTASSIUM 4.1 4.2 4.2 4.2 4.5   CHLORIDE 100 101 102 100 98   CO2 22.8 23.3 22.1 25.3 25.6   BUN 49* 33* 55* 48* 16   CREATININE 3.77* 3.11* 4.05* 4.21* 1.86*   GLUCOSE 105* 110* 239* 148* 135*   CALCIUM 7.9* 8.3* 7.9* 7.9* 8.7   PHOSPHORUS  --  3.4 4.3  --   --    TOTAL PROTEIN  --   --   --  5.8* 6.5   ALBUMIN  --  3.3*  --  3.3* 3.9   GLOBULIN  --   --   --  2.5 2.6     XR Chest 1 View    Result Date: 4/21/2024  XR CHEST 1 VW-  Date of Exam: 4/21/2024 1:43 PM  Indication: SOA Triage Protocol  Comparison: 4/17/2024  Findings: Postsurgical changes in the left hemithorax with shift of mediastinal structures from right to left appear stable.  Linear opacity in the right upper lobe is stable.  Right jugular central venous catheter is in unchanged position, the tip is in the mid SVC.  Bony structures appear intact.      Impression: Impression: No significant change in comparison to 4/17/2024, as above.   Electronically Signed By-SAVI LUCERO MD On:4/21/2024 1:48 PM         Assessment & Plan   Assessment / Plan     Active Hospital Problems:  Active Hospital Problems    Diagnosis     ESRD (end  stage renal disease)     COPD exacerbation     COPD with acute exacerbation     ESRD (end stage renal disease)     Anemia of chronic disease     Essential hypertension     Acute on chronic heart failure with preserved ejection fraction (HFpEF)     Chronic obstructive pulmonary disease with acute exacerbation          Impression:   Diastolic heart failure with acute exacerbation  COPD with acute exacerbation  End-stage renal disease on dialysis  History of smoking in past  Persistent cough  Viral bronchitis with human metapneumovirus    Plan:   Check sputum culture.  Respiratory viral panel was positive for human metapneumovirus.  Continue with Brovana and Pulmicort nebulizers twice daily.  Was on Symbicort at home.  Will likely need scheduled DuoNeb or Spiriva at home.  Start DuoNeb nebulizer 4 times a day  Continue chest with likely secretions obstructing right bronchus intermedius.  Discussed bronchoscopy with bronchoalveolar lavage, bronchial washing, possible transbronchial biopsy, endobronchial brushing, airway inspection.  Risks and benefits of the procedure were discussed in detail.  Alternatives and options were reviewed.  Risks including pneumothorax, pneumonia, bleeding, respiratory depression and that it could be fatal were all reviewed.  Patient understands and is agreeable for the procedure.  Proceed with bronchoscopy today.  Continue dialysis per nephrology service.  Cardiac meds including Coreg, hydralazine per primary service and cardiology service.  Continue azithromycin 250 mg once daily.       DVT prophylaxis:  Medical and mechanical DVT prophylaxis orders are present.        CODE STATUS:   Code Status (Patient has no pulse and is not breathing): CPR (Attempt to Resuscitate)  Medical Interventions (Patient has pulse or is breathing): Full Support      I personally reviewed pertinent labs, imaging and provider notes. Discussed with bedside nurse and will discuss with primary service.        Electronically signed by Khalif Yanez MD, 4/24/2024, 07:57 EDT.

## 2024-04-24 NOTE — OP NOTE
Bronchoscopy Procedure Note    Procedure:  Bronchoscopy with mucous plug removal  Bronchoscopy with bronchoalveolar lavage left lower lobe  Bronchoscopy with bronchial washings tracheobronchial tree  Airway inspection    Pre-Operative Diagnosis: Persistent cough, bronchitis, difficulty with airway clearance.    Post-Operative Diagnosis: Significant mucous plugging, status post left upper lobectomy, indurated airway    Indication:  Persistent cough, bronchitis, difficulty with airway clearance    Anesthesia: MAC anesthesia    Procedure Details: Patient was consented for the procedure with all risks and benefits of the procedure explained in detail. Patient was given the opportunity to ask questions and all concerns were answered.    The bronchoscope was inserted into the main airway via the mouth. An anatomical survey was done of the main airways and the subsegmental bronchus. The findings are reported below.  Significant mucus plugging was seen in the trachea and bilateral bronchial tubes including right and left bronchial tubes.  It was suctioned out in multiple attempts.  A bronchoalveolar lavage was performed using 60 mL aliquots of normal saline x 2 instilled into the airways then aspirated back from left lower lobe of lung with 45 mL serosanguineous fluid in return.  Bronchial washing was obtained from entire tracheobronchial tree.    Findings:  Significant mucus plugging was seen in the trachea and bilateral bronchial tubes including right and left bronchial tubes.  It was suctioned out in multiple attempts.   Friable mucosa, easy bleeding with suction  Scattered purulent secretions    Estimated Blood Loss: Minimal    Specimens:  BAL left lower lobe  Bronchial washings tracheobronchial tree    Complications: None; patient tolerated the procedure well.    Disposition: To [facility], once stable in recovery.    Patient tolerated the procedure well.      Electronically signed by Khalif Yanez MD, 4/24/2024,  15:18 EDT.

## 2024-04-24 NOTE — PROGRESS NOTES
" LOS: 2 days   Patient Care Team:  Rafael Lyons MD as PCP - General (Internal Medicine)  Regis Mckeon MD as Consulting Physician (Radiation Oncology)  Carlton Casillas MD as Consulting Physician (Gastroenterology)  Susan Marcos APRN as Nurse Practitioner (Gastroenterology)    Chief Complaint: ESRD    Subjective     History of Present Illness  Pt still with increased dyspnea but may be better.  Seen earlier this morning on dialysis.  Bronchoscopy planned today.    Subjective:  Symptoms:  Improved.  She reports shortness of breath, cough and weakness.  No chest pain, chest pressure or anxiety.    Diet:  No nausea or vomiting.        History taken from: patient chart    Objective     Vital Sign Min/Max for last 24 hours  Temp  Min: 97.5 °F (36.4 °C)  Max: 98.7 °F (37.1 °C)   BP  Min: 84/51  Max: 188/63   Pulse  Min: 65  Max: 85   Resp  Min: 14  Max: 27   SpO2  Min: 91 %  Max: 100 %   Flow (L/min)  Min: 2  Max: 5   Weight  Min: 58.9 kg (129 lb 13.6 oz)  Max: 58.9 kg (129 lb 13.6 oz)     Flowsheet Rows      Flowsheet Row First Filed Value   Admission Height 165.1 cm (65\") Documented at 04/21/2024 1328   Admission Weight 61.6 kg (135 lb 12.9 oz) Documented at 04/21/2024 1328            I/O this shift:  In: 100 [I.V.:100]  Out: 1000   I/O last 3 completed shifts:  In: 840 [P.O.:840]  Out: 100 [Urine:100]    Objective:  General Appearance:  Comfortable and in no acute distress.    Vital signs: (most recent): Blood pressure (!) 84/51, pulse 85, temperature 97.5 °F (36.4 °C), temperature source Temporal, resp. rate 27, height 165.1 cm (65\"), weight 58.9 kg (129 lb 13.6 oz), SpO2 91%, not currently breastfeeding.  Vital signs are normal.  No fever.    HEENT: Normal HEENT exam.    Lungs:  Normal effort and normal respiratory rate.  She is not in respiratory distress.  There are decreased breath sounds and rhonchi.    Heart: Normal rate.  Regular rhythm.    Chest: (Right IJ TDC.)  Abdomen: Abdomen is soft.  " "Bowel sounds are normal.   There is no abdominal tenderness.     Extremities: Normal range of motion.  There is no dependent edema.  (Patent left upper extremity AV fistula)  Pulses: Distal pulses are intact.    Neurological: Patient is alert and oriented to person, place and time.    Pupils:  Pupils are equal, round, and reactive to light.    Skin:  Warm and dry.                Results Review:     I reviewed the patient's new clinical results.  I reviewed the patient's new imaging results and agree with the interpretation.  I reviewed the patient's other test results and agree with the interpretation    WBC WBC   Date Value Ref Range Status   04/23/2024 9.76 3.40 - 10.80 10*3/mm3 Final   04/22/2024 5.25 3.40 - 10.80 10*3/mm3 Final      HGB Hemoglobin   Date Value Ref Range Status   04/23/2024 8.1 (L) 12.0 - 15.9 g/dL Final   04/22/2024 8.3 (L) 12.0 - 15.9 g/dL Final      HCT Hematocrit   Date Value Ref Range Status   04/23/2024 26.8 (L) 34.0 - 46.6 % Final   04/22/2024 27.3 (L) 34.0 - 46.6 % Final      Platlets No results found for: \"LABPLAT\"   MCV MCV   Date Value Ref Range Status   04/23/2024 98.9 (H) 79.0 - 97.0 fL Final   04/22/2024 100.0 (H) 79.0 - 97.0 fL Final          Sodium Sodium   Date Value Ref Range Status   04/24/2024 136 136 - 145 mmol/L Final   04/23/2024 136 136 - 145 mmol/L Final   04/22/2024 139 136 - 145 mmol/L Final      Potassium Potassium   Date Value Ref Range Status   04/24/2024 4.1 3.5 - 5.2 mmol/L Final   04/23/2024 4.2 3.5 - 5.2 mmol/L Final   04/22/2024 4.2 3.5 - 5.2 mmol/L Final      Chloride Chloride   Date Value Ref Range Status   04/24/2024 100 98 - 107 mmol/L Final   04/23/2024 101 98 - 107 mmol/L Final   04/22/2024 102 98 - 107 mmol/L Final      CO2 CO2   Date Value Ref Range Status   04/24/2024 22.8 22.0 - 29.0 mmol/L Final   04/23/2024 23.3 22.0 - 29.0 mmol/L Final   04/22/2024 22.1 22.0 - 29.0 mmol/L Final      BUN BUN   Date Value Ref Range Status   04/24/2024 49 (H) 8 - 23 " "mg/dL Final   04/23/2024 33 (H) 8 - 23 mg/dL Final   04/22/2024 55 (H) 8 - 23 mg/dL Final      Creatinine Creatinine   Date Value Ref Range Status   04/24/2024 3.77 (H) 0.57 - 1.00 mg/dL Final   04/23/2024 3.11 (H) 0.57 - 1.00 mg/dL Final   04/22/2024 4.05 (H) 0.57 - 1.00 mg/dL Final      Calcium Calcium   Date Value Ref Range Status   04/24/2024 7.9 (L) 8.6 - 10.5 mg/dL Final   04/23/2024 8.3 (L) 8.6 - 10.5 mg/dL Final   04/22/2024 7.9 (L) 8.6 - 10.5 mg/dL Final      PO4 No results found for: \"CAPO4\"   Albumin Albumin   Date Value Ref Range Status   04/23/2024 3.3 (L) 3.5 - 5.2 g/dL Final      Magnesium Magnesium   Date Value Ref Range Status   04/22/2024 1.8 1.6 - 2.4 mg/dL Final      Uric Acid No results found for: \"URICACID\"     Medication Review:   arformoterol, 15 mcg, Nebulization, BID - RT  azithromycin, 250 mg, Oral, Q24H  budesonide, 0.5 mg, Nebulization, BID - RT  carvedilol, 25 mg, Oral, BID With Meals  epoetin mita/mita-epbx, 10,000 Units, Intravenous, Once per day on Monday Wednesday Friday  [START ON 4/29/2024] ergocalciferol, 50,000 Units, Oral, Q14 Days  famotidine, 20 mg, Oral, Daily  hydrALAZINE, 25 mg, Oral, Q12H  insulin lispro, 2-9 Units, Subcutaneous, 4x Daily AC & at Bedtime  isosorbide dinitrate, 20 mg, Oral, TID - Nitrates  methylPREDNISolone sodium succinate, 62.5 mg, Intravenous, Daily  multivitamin with minerals, 1 tablet, Oral, Daily  rosuvastatin, 20 mg, Oral, Nightly  sevelamer, 800 mg, Oral, TID With Meals  sodium chloride, 10 mL, Intravenous, Q12H          Assessment & Plan       Chronic obstructive pulmonary disease with acute exacerbation    Acute on chronic heart failure with preserved ejection fraction (HFpEF)    Essential hypertension    Anemia of chronic disease    ESRD (end stage renal disease)    COPD with acute exacerbation    COPD exacerbation    ESRD (end stage renal disease)      Assessment & Plan  - ESRD, dialysis 3 times a week, continue current schedule.  UF as " tolerated.  Volume status and electrolytes seem to be adequate.  Using her right IJ TDC until left upper extremity AV fistula is cleared for use after recent declot.  - Shortness of breath, recurrent.  Being evaluated by pulmonary and cardiology.  - Anemia of chronic kidney disease, receiving long-acting CASSANDRA as outpatient.  Iron studies are adequate.  - Diastolic dysfunction with LVH and moderate aortic stenosis.  Nitro, carvedilol and hydralazine added.  Cardiology evaluating.  - Left renal lesion with recent spontaneous right perinephric subcapsular hematoma, needs urology follow-up as outpatient.  - History of hypertension  - Type 2 diabetes with complications.  - History of lung cancer and COPD.  Seeing pulmonary.  On Zithromax and steroids.  Positive for metapneumovirus.  Bronchoscopy planned today.    Edi García MD  04/24/24  15:40 EDT

## 2024-04-24 NOTE — SIGNIFICANT NOTE
04/24/24 1459   Physical Therapy Time and Intention   Session Not Performed   (Pt is scheduled for a bronchoscopy today.)

## 2024-04-24 NOTE — PLAN OF CARE
Goal Outcome Evaluation:  Plan of Care Reviewed With: patient           Outcome Evaluation: Patient AAOx4. VSS. Patient resting comfortably in bed. Prn tylenol given once for c/o abdominal aching pain 6/10. per pt intervention affective. Pt NPO for Bronchoscopy. Consent form signed and obtained this shift. 4unit insulin given for blood glucose 203mg/dL. Sputum collected and send to lab. Patient  expreses no other needs at this time, call light within reach. Continue Plan of care.

## 2024-04-24 NOTE — PLAN OF CARE
Goal Outcome Evaluation:  Plan of Care Reviewed With: patient   Dialysis done today, 1L off. Pt underwent bronchoscopy, arrived back to unit at 1600. Pt a/0x4, no complaints of moderate to severe pain at this time. Diet order restarted per nursing communication. Continuing with plan of care.

## 2024-04-24 NOTE — NURSING NOTE
Duration of Treatment 4.0 Hours                                 Ran entire tx   Access Site Tunneled Dialysis Catheter   Dialyzer Revaclear    mL/min   Dialysate Temperature (C) 36   BFR-As tolerated to a maximum of: 400 mL/min   Dialysate Solution Bath: K+ 3 mEq, CA 2.5mg   Bicarb 33 mEq   Na+ 137 meq   Fluid Removal: 1L                                             Removed 1L       Patient completed 4 hour treatment. Removed 1L. Catheter functioned well. Patient was kept NPO for bronch. No complaints from patient. Post /63, .    Educated on fluid management    Report given to Elo primary RN.

## 2024-04-25 LAB
ACB CMPLX DNA BAL NAA+NON-PRB-NCNCRNG: NOT DETECTED
ALBUMIN SERPL-MCNC: 2.9 G/DL (ref 3.5–5.2)
ANION GAP SERPL CALCULATED.3IONS-SCNC: 8.2 MMOL/L (ref 5–15)
BLACTX-M ISLT/SPM QL: NOT DETECTED
BLAIMP ISLT/SPM QL: NOT DETECTED
BLAKPC ISLT/SPM QL: NOT DETECTED
BLAOXA-48-LIKE ISLT/SPM QL: ABNORMAL
BLAVIM ISLT/SPM QL: NOT DETECTED
BUN SERPL-MCNC: 20 MG/DL (ref 8–23)
BUN/CREAT SERPL: 8.6 (ref 7–25)
C PNEUM DNA NPH QL NAA+NON-PROBE: NOT DETECTED
CALCIUM SPEC-SCNC: 8.1 MG/DL (ref 8.6–10.5)
CHLORIDE SERPL-SCNC: 108 MMOL/L (ref 98–107)
CO2 SERPL-SCNC: 24.8 MMOL/L (ref 22–29)
CREAT SERPL-MCNC: 2.33 MG/DL (ref 0.57–1)
DEPRECATED RDW RBC AUTO: 55.4 FL (ref 37–54)
E CLOAC COMP DNA BAL NAA+NON-PRB-NCNCRNG: NOT DETECTED
E COLI DNA BAL NAA+NON-PRB-NCNCRNG: NOT DETECTED
EGFRCR SERPLBLD CKD-EPI 2021: 19.8 ML/MIN/1.73
ERYTHROCYTE [DISTWIDTH] IN BLOOD BY AUTOMATED COUNT: 15.3 % (ref 12.3–15.4)
FLUAV SUBTYP SPEC NAA+PROBE: NOT DETECTED
FLUBV RNA ISLT QL NAA+PROBE: NOT DETECTED
GLUCOSE BLDC GLUCOMTR-MCNC: 154 MG/DL (ref 70–99)
GLUCOSE BLDC GLUCOMTR-MCNC: 183 MG/DL (ref 70–99)
GLUCOSE BLDC GLUCOMTR-MCNC: 273 MG/DL (ref 70–99)
GLUCOSE BLDC GLUCOMTR-MCNC: 81 MG/DL (ref 70–99)
GLUCOSE SERPL-MCNC: 89 MG/DL (ref 65–99)
GP B STREP DNA BAL NAA+NON-PRB-NCNCRNG: NOT DETECTED
HADV DNA SPEC NAA+PROBE: NOT DETECTED
HAEM INFLU DNA BAL NAA+NON-PRB-NCNCRNG: NOT DETECTED
HCOV RNA LOWER RESP QL NAA+NON-PROBE: NOT DETECTED
HCT VFR BLD AUTO: 28.3 % (ref 34–46.6)
HGB BLD-MCNC: 8.2 G/DL (ref 12–15.9)
HMPV RNA NPH QL NAA+NON-PROBE: DETECTED
HPIV RNA LOWER RESP QL NAA+NON-PROBE: NOT DETECTED
K AEROGENES DNA BAL NAA+NON-PRB-NCNCRNG: NOT DETECTED
K OXYTOCA DNA BAL NAA+NON-PRB-NCNCRNG: NOT DETECTED
K PNEU GRP DNA BAL NAA+NON-PRB-NCNCRNG: NOT DETECTED
L PNEUMO DNA LOWER RESP QL NAA+NON-PROBE: NOT DETECTED
M CATARRHALIS DNA BAL NAA+NON-PRB-NCNCRNG: NOT DETECTED
M PNEUMO IGG SER IA-ACNC: NOT DETECTED
MCH RBC QN AUTO: 29.7 PG (ref 26.6–33)
MCHC RBC AUTO-ENTMCNC: 29 G/DL (ref 31.5–35.7)
MCV RBC AUTO: 102.5 FL (ref 79–97)
MECA+MECC ISLT/SPM QL: ABNORMAL
NDM GENE: NOT DETECTED
P AERUGINOSA DNA BAL NAA+NON-PRB-NCNCRNG: DETECTED
PHOSPHATE SERPL-MCNC: 2.8 MG/DL (ref 2.5–4.5)
PLATELET # BLD AUTO: 174 10*3/MM3 (ref 140–450)
PMV BLD AUTO: 9.2 FL (ref 6–12)
POTASSIUM SERPL-SCNC: 4 MMOL/L (ref 3.5–5.2)
PROTEUS SP DNA BAL NAA+NON-PRB-NCNCRNG: NOT DETECTED
RBC # BLD AUTO: 2.76 10*6/MM3 (ref 3.77–5.28)
RHINOVIRUS RNA SPEC NAA+PROBE: NOT DETECTED
RSV RNA NPH QL NAA+NON-PROBE: NOT DETECTED
S AUREUS DNA BAL NAA+NON-PRB-NCNCRNG: NOT DETECTED
S MARCESCENS DNA BAL NAA+NON-PRB-NCNCRNG: NOT DETECTED
S PNEUM DNA BAL NAA+NON-PRB-NCNCRNG: NOT DETECTED
S PYO DNA BAL NAA+NON-PRB-NCNCRNG: NOT DETECTED
SODIUM SERPL-SCNC: 141 MMOL/L (ref 136–145)
WBC NRBC COR # BLD AUTO: 7.19 10*3/MM3 (ref 3.4–10.8)

## 2024-04-25 PROCEDURE — 25010000002 METHYLPREDNISOLONE PER 125 MG: Performed by: INTERNAL MEDICINE

## 2024-04-25 PROCEDURE — 94799 UNLISTED PULMONARY SVC/PX: CPT

## 2024-04-25 PROCEDURE — 80069 RENAL FUNCTION PANEL: CPT | Performed by: INTERNAL MEDICINE

## 2024-04-25 PROCEDURE — 85027 COMPLETE CBC AUTOMATED: CPT | Performed by: INTERNAL MEDICINE

## 2024-04-25 PROCEDURE — 99233 SBSQ HOSP IP/OBS HIGH 50: CPT | Performed by: INTERNAL MEDICINE

## 2024-04-25 PROCEDURE — 97530 THERAPEUTIC ACTIVITIES: CPT

## 2024-04-25 PROCEDURE — 82948 REAGENT STRIP/BLOOD GLUCOSE: CPT

## 2024-04-25 PROCEDURE — 82948 REAGENT STRIP/BLOOD GLUCOSE: CPT | Performed by: INTERNAL MEDICINE

## 2024-04-25 PROCEDURE — 94664 DEMO&/EVAL PT USE INHALER: CPT

## 2024-04-25 PROCEDURE — 63710000001 INSULIN LISPRO (HUMAN) PER 5 UNITS: Performed by: INTERNAL MEDICINE

## 2024-04-25 RX ADMIN — BUDESONIDE 0.5 MG: 0.5 SUSPENSION RESPIRATORY (INHALATION) at 07:00

## 2024-04-25 RX ADMIN — TRAMADOL HYDROCHLORIDE 25 MG: 50 TABLET ORAL at 22:05

## 2024-04-25 RX ADMIN — INSULIN LISPRO 6 UNITS: 100 INJECTION, SOLUTION INTRAVENOUS; SUBCUTANEOUS at 18:02

## 2024-04-25 RX ADMIN — SEVELAMER CARBONATE 800 MG: 800 TABLET, FILM COATED ORAL at 12:21

## 2024-04-25 RX ADMIN — INSULIN LISPRO 2 UNITS: 100 INJECTION, SOLUTION INTRAVENOUS; SUBCUTANEOUS at 12:22

## 2024-04-25 RX ADMIN — IPRATROPIUM BROMIDE AND ALBUTEROL SULFATE 3 ML: .5; 3 SOLUTION RESPIRATORY (INHALATION) at 03:11

## 2024-04-25 RX ADMIN — BUDESONIDE 0.5 MG: 0.5 SUSPENSION RESPIRATORY (INHALATION) at 18:41

## 2024-04-25 RX ADMIN — Medication 1 TABLET: at 09:20

## 2024-04-25 RX ADMIN — AZITHROMYCIN DIHYDRATE 250 MG: 250 TABLET ORAL at 09:19

## 2024-04-25 RX ADMIN — Medication 10 ML: at 09:21

## 2024-04-25 RX ADMIN — FAMOTIDINE 20 MG: 20 TABLET ORAL at 09:20

## 2024-04-25 RX ADMIN — Medication 5 MG: at 22:07

## 2024-04-25 RX ADMIN — ISOSORBIDE DINITRATE 20 MG: 20 TABLET ORAL at 12:21

## 2024-04-25 RX ADMIN — HYDRALAZINE HYDROCHLORIDE 25 MG: 50 TABLET ORAL at 21:48

## 2024-04-25 RX ADMIN — Medication 10 ML: at 21:49

## 2024-04-25 RX ADMIN — INSULIN LISPRO 2 UNITS: 100 INJECTION, SOLUTION INTRAVENOUS; SUBCUTANEOUS at 21:48

## 2024-04-25 RX ADMIN — CARVEDILOL 25 MG: 25 TABLET, FILM COATED ORAL at 09:20

## 2024-04-25 RX ADMIN — SEVELAMER CARBONATE 800 MG: 800 TABLET, FILM COATED ORAL at 18:02

## 2024-04-25 RX ADMIN — SEVELAMER CARBONATE 800 MG: 800 TABLET, FILM COATED ORAL at 09:20

## 2024-04-25 RX ADMIN — CARVEDILOL 25 MG: 25 TABLET, FILM COATED ORAL at 18:02

## 2024-04-25 RX ADMIN — ISOSORBIDE DINITRATE 20 MG: 20 TABLET ORAL at 09:19

## 2024-04-25 RX ADMIN — ISOSORBIDE DINITRATE 20 MG: 20 TABLET ORAL at 18:02

## 2024-04-25 RX ADMIN — METHYLPREDNISOLONE SODIUM SUCCINATE 62.5 MG: 125 INJECTION INTRAMUSCULAR; INTRAVENOUS at 09:20

## 2024-04-25 RX ADMIN — ARFORMOTEROL TARTRATE 15 MCG: 15 SOLUTION RESPIRATORY (INHALATION) at 07:00

## 2024-04-25 RX ADMIN — ARFORMOTEROL TARTRATE 15 MCG: 15 SOLUTION RESPIRATORY (INHALATION) at 18:41

## 2024-04-25 RX ADMIN — ROSUVASTATIN CALCIUM 20 MG: 20 TABLET, FILM COATED ORAL at 21:48

## 2024-04-25 RX ADMIN — HYDRALAZINE HYDROCHLORIDE 25 MG: 50 TABLET ORAL at 09:20

## 2024-04-25 NOTE — PROGRESS NOTES
Pulmonary / Critical Care Progress Note      Patient Name: Charlette Ria  : 1935  MRN: [id number]  Primary Care Physician:  Rafael Lyons MD  Date of admission: 2024    Subjective   Subjective   Follow-up for persistent bronchitis, respiratory failure, failure with airway clearance, ESRD on dialysis.    Over past 24 hours: Continued to have difficulty with airway clearance.  Continued with nebulizers, incentive spirometer and flutter valve.  Had bronchoscopy with mucous plug removal.    No acute events overnight.     This morning,   Feels better with breathing.  Shortness of breath is improving.  Cough and phlegm is improving  No nausea or vomiting.  No fever or chills.  Has been able to get out of bed.    Review of Systems  General:  Fatigue, No Fever  HEENT: No dysphagia, No Visual Changes, no rhinorrhea  Respiratory:  + cough,+Dyspnea, no phlegm, No Pleuritic Pain, + wheezing, no hemoptysis  Cardiovascular: Denies chest pain, denies palpitations,+HAIDER, No Chest Pressure  Gastrointestinal:  No Abdominal Pain, No Nausea, No Vomiting, No Diarrhea  Genitourinary:  No Dysuria, No Frequency, No Hesitancy  Musculoskeletal: No muscle pain or swelling  Endocrine:  No Heat Intolerance, No Cold Intolerance  Neurologic:  No Confusion, no Dysarthria, No Headaches  Skin:  No Rash, No Open Wounds          Objective   Objective     Vitals:   Temp:  [97.3 °F (36.3 °C)-98.7 °F (37.1 °C)] 97.9 °F (36.6 °C)  Heart Rate:  [65-85] 71  Resp:  [14-27] 16  BP: ()/(41-66) 182/54  Flow (L/min):  [2-5] 3  Physical Exam   Vital Signs Reviewed   General:  WDWN, Alert, mild distress, has conversational dyspnea.    HEENT:  PERRL, EOMI.  OP, nares clear, no sinus tenderness  Neck:  Supple, no JVD, no thyromegaly  Lymph: no axillary, cervical, supraclavicular lymphadenopathy noted bilaterally  Chest: Poor aeration, minimal scattered rhonchi, no wheezing, bibasilar crackles, resonant to percussion bilaterally, TDC in place  CV:  RRR, no MGR, pulses 2+, equal.  Abd:  Soft, NT, ND, + BS, no HSM  EXT:  no clubbing, no cyanosis, trace lower extremity edema, no joint tenderness  Neuro:  A&Ox3, CN grossly intact, no focal deficits.  Skin: No rashes or lesions noted      Result Review    Result Review:  I have personally reviewed the results from the time of this admission to 4/25/2024 07:41 EDT and agree with these findings:  [x]  Laboratory  [x]  Microbiology  [x]  Radiology  [x]  EKG/Telemetry   [x]  Cardiology/Vascular   []  Pathology  []  Old records  []  Other:  Most notable findings include:         Lab 04/25/24  0617 04/24/24  0603 04/23/24  0552 04/22/24  0518 04/21/24  1350   WBC 7.19  --  9.76 5.25 7.66   HEMOGLOBIN 8.2*  --  8.1* 8.3* 8.5*   HEMATOCRIT 28.3*  --  26.8* 27.3* 28.2*   PLATELETS 174  --  183 152 150   SODIUM 141 136 136 139 138   POTASSIUM 4.0 4.1 4.2 4.2 4.2   CHLORIDE 108* 100 101 102 100   CO2 24.8 22.8 23.3 22.1 25.3   BUN 20 49* 33* 55* 48*   CREATININE 2.33* 3.77* 3.11* 4.05* 4.21*   GLUCOSE 89 105* 110* 239* 148*   CALCIUM 8.1* 7.9* 8.3* 7.9* 7.9*   PHOSPHORUS 2.8  --  3.4 4.3  --    TOTAL PROTEIN  --   --   --   --  5.8*   ALBUMIN 2.9*  --  3.3*  --  3.3*   GLOBULIN  --   --   --   --  2.5            Assessment & Plan   Assessment / Plan     Active Hospital Problems:  Active Hospital Problems    Diagnosis     ESRD (end stage renal disease)     COPD exacerbation     COPD with acute exacerbation     ESRD (end stage renal disease)     Anemia of chronic disease     Essential hypertension     Acute on chronic heart failure with preserved ejection fraction (HFpEF)     Chronic obstructive pulmonary disease with acute exacerbation          Impression:   Diastolic heart failure with acute exacerbation  COPD with acute exacerbation  End-stage renal disease on dialysis  History of smoking in past  Persistent cough  Viral bronchitis with human metapneumovirus    Plan:   Respiratory viral panel was positive for human  metapneumovirus.  Continue with Brovana and Pulmicort nebulizers twice daily.  Was on Symbicort at home.  Will likely need scheduled DuoNeb or Spiriva at home.  Continue DuoNeb nebulizer 4 times a day  Continue chest vest, likely secretions obstructing right bronchus intermedius.  Status post bronchoscopy with mucous plug removal.  She is feeling better.  Follow cultures, will likely need prolonged antibiotics and steroids.  Continue dialysis per nephrology service.  Cardiac meds including Coreg, hydralazine per primary service and cardiology service.  Continue azithromycin 250 mg once daily.       DVT prophylaxis:  Medical and mechanical DVT prophylaxis orders are present.        CODE STATUS:   Code Status (Patient has no pulse and is not breathing): CPR (Attempt to Resuscitate)  Medical Interventions (Patient has pulse or is breathing): Full Support      I personally reviewed pertinent labs, imaging and provider notes. Discussed with bedside nurse and will discuss with primary service.       Electronically signed by Khalif Yanez MD, 4/25/2024, 07:41 EDT.

## 2024-04-25 NOTE — PROGRESS NOTES
[facility]     Progress Note    Patient Name: Charlette Rai  : 1935  MRN: [id number]  Primary Care Physician:  Rafael Lyons MD  Date of admission: 2024      Subjective   Brief summary.  Patient admitted with increasing shortness of breath, status post [facility] yesterday      HPI:  Continues to have some shortness of breath and chest congestion, weak, slightly better.  No fever or chills.  No dialysis planned for today.      Review of Systems     Denies any fever or chills, no chest pain, weakness and cough and shortness of breath.      Objective     Vitals:   Temp:  [97.3 °F (36.3 °C)-98.4 °F (36.9 °C)] 98.1 °F (36.7 °C)  Heart Rate:  [65-85] 70  Resp:  [14-27] 16  BP: ()/(41-66) 166/44  Flow (L/min):  [2-5] 3    Physical Exam :     Elderly female not in respiratory distress.  Heart regular.  Lungs coarse breath sounds less rhonchi compared to yesterday but still congested.  Abdomen soft.  Nontender.  Neurologically awake alert and oriented.  Extremities no edema.     Result Review:  I have personally reviewed the results from the time of this admission to 2024 08:14 EDT and agree with these findings:  [x]  Laboratory  []  Microbiology  [x]  Radiology  []  EKG/Telemetry   []  Cardiology/Vascular   []  Pathology  []  Old records  []  Other:       Reviewed    Assessment / Plan       Active Hospital Problems:  Active Hospital Problems    Diagnosis     ESRD (end stage renal disease)     COPD exacerbation     COPD with acute exacerbation     ESRD (end stage renal disease)     Anemia of chronic disease     Essential hypertension     Acute on chronic heart failure with preserved ejection fraction (HFpEF)     Chronic obstructive pulmonary disease with acute exacerbation        Plan:   Patient post bronchoscopy, mucous plugging removed.  Continues to have congestion and wheezing.  Patient very weak.  Cardiologist on board for CHF.  At this point we will see recommendations from cardiologist and  pulmonologist.  Patient appears to be improving, recommended inpatient rehab but patient not decided yet.  Possible discharge in couple of days if continues to improve    DVT prophylaxis:  Medical and mechanical DVT prophylaxis orders are present.        CODE STATUS:   Code Status (Patient has no pulse and is not breathing): CPR (Attempt to Resuscitate)  Medical Interventions (Patient has pulse or is breathing): Full Support              Electronically signed by Rafael Lyons MD, 04/25/24, 8:16 AM EDT.

## 2024-04-25 NOTE — SIGNIFICANT NOTE
04/25/24 1115   Coping/Psychosocial   Observed Emotional State calm;cooperative   Verbalized Emotional State hopefulness   Trust Relationship/Rapport empathic listening provided   Involvement in Care interacting with patient   Additional Documentation Spiritual Care (Group)   Spiritual Care   Use of Spiritual Resources non-Temple use of spiritual care   Spiritual Care Source  initiative   Spiritual Care Follow-Up follow-up, none required as presently assessed   Response to Spiritual Care receptive of support;engaged in conversation   Spiritual Care Interventions supportive conversation provided   Spiritual Care Visit Type initial   Receptivity to Spiritual Care visit welcomed

## 2024-04-25 NOTE — PLAN OF CARE
Goal Outcome Evaluation:  Plan of Care Reviewed With: patient        Progress: improving     VSS throughout shift. Continues on 3L NC. Blood sugar treated per MAR. Plan for dialysis in the am. She voices no complaints. Will cont POC

## 2024-04-25 NOTE — PROGRESS NOTES
" LOS: 3 days   Patient Care Team:  Rafael Lyons MD as PCP - General (Internal Medicine)  Regis Mckeon MD as Consulting Physician (Radiation Oncology)  Carlton Casillas MD as Consulting Physician (Gastroenterology)  Susan Marcos APRN as Nurse Practitioner (Gastroenterology)    Chief Complaint: ESRD    Subjective     History of Present Illness  Pt still with increased dyspnea but better.    No new events.    Subjective:  Symptoms:  Improved.  She reports shortness of breath, cough and weakness.  No chest pain, chest pressure or anxiety.    Diet:  No nausea or vomiting.        History taken from: patient chart    Objective     Vital Sign Min/Max for last 24 hours  Temp  Min: 97.3 °F (36.3 °C)  Max: 98.4 °F (36.9 °C)   BP  Min: 84/51  Max: 188/63   Pulse  Min: 69  Max: 85   Resp  Min: 14  Max: 27   SpO2  Min: 91 %  Max: 100 %   Flow (L/min)  Min: 2  Max: 5   Weight  Min: 58.6 kg (129 lb 3 oz)  Max: 58.6 kg (129 lb 3 oz)     Flowsheet Rows      Flowsheet Row First Filed Value   Admission Height 165.1 cm (65\") Documented at 04/21/2024 1328   Admission Weight 61.6 kg (135 lb 12.9 oz) Documented at 04/21/2024 1328            I/O this shift:  In: -   Out: 50 [Urine:50]  I/O last 3 completed shifts:  In: 100 [I.V.:100]  Out: 1200 [Urine:200]    Objective:  General Appearance:  Comfortable and in no acute distress.    Vital signs: (most recent): Blood pressure 144/50, pulse 75, temperature 97.7 °F (36.5 °C), temperature source Oral, resp. rate 16, height 165.1 cm (65\"), weight 58.6 kg (129 lb 3 oz), SpO2 98%, not currently breastfeeding.  Vital signs are normal.  No fever.    HEENT: Normal HEENT exam.    Lungs:  Normal effort and normal respiratory rate.  She is not in respiratory distress.  There are decreased breath sounds and rhonchi.    Heart: Normal rate.  Regular rhythm.    Chest: (Right IJ TDC.)  Abdomen: Abdomen is soft.  Bowel sounds are normal.   There is no abdominal tenderness.     Extremities: " "Normal range of motion.  There is no dependent edema.  (Patent left upper extremity AV fistula)  Pulses: Distal pulses are intact.    Neurological: Patient is alert and oriented to person, place and time.    Pupils:  Pupils are equal, round, and reactive to light.    Skin:  Warm and dry.                Results Review:     I reviewed the patient's new clinical results.  I reviewed the patient's new imaging results and agree with the interpretation.  I reviewed the patient's other test results and agree with the interpretation    WBC WBC   Date Value Ref Range Status   04/25/2024 7.19 3.40 - 10.80 10*3/mm3 Final   04/23/2024 9.76 3.40 - 10.80 10*3/mm3 Final      HGB Hemoglobin   Date Value Ref Range Status   04/25/2024 8.2 (L) 12.0 - 15.9 g/dL Final   04/23/2024 8.1 (L) 12.0 - 15.9 g/dL Final      HCT Hematocrit   Date Value Ref Range Status   04/25/2024 28.3 (L) 34.0 - 46.6 % Final   04/23/2024 26.8 (L) 34.0 - 46.6 % Final      Platlets No results found for: \"LABPLAT\"   MCV MCV   Date Value Ref Range Status   04/25/2024 102.5 (H) 79.0 - 97.0 fL Final   04/23/2024 98.9 (H) 79.0 - 97.0 fL Final          Sodium Sodium   Date Value Ref Range Status   04/25/2024 141 136 - 145 mmol/L Final   04/24/2024 136 136 - 145 mmol/L Final   04/23/2024 136 136 - 145 mmol/L Final      Potassium Potassium   Date Value Ref Range Status   04/25/2024 4.0 3.5 - 5.2 mmol/L Final   04/24/2024 4.1 3.5 - 5.2 mmol/L Final   04/23/2024 4.2 3.5 - 5.2 mmol/L Final      Chloride Chloride   Date Value Ref Range Status   04/25/2024 108 (H) 98 - 107 mmol/L Final   04/24/2024 100 98 - 107 mmol/L Final   04/23/2024 101 98 - 107 mmol/L Final      CO2 CO2   Date Value Ref Range Status   04/25/2024 24.8 22.0 - 29.0 mmol/L Final   04/24/2024 22.8 22.0 - 29.0 mmol/L Final   04/23/2024 23.3 22.0 - 29.0 mmol/L Final      BUN BUN   Date Value Ref Range Status   04/25/2024 20 8 - 23 mg/dL Final   04/24/2024 49 (H) 8 - 23 mg/dL Final   04/23/2024 33 (H) 8 - 23 " "mg/dL Final      Creatinine Creatinine   Date Value Ref Range Status   04/25/2024 2.33 (H) 0.57 - 1.00 mg/dL Final   04/24/2024 3.77 (H) 0.57 - 1.00 mg/dL Final   04/23/2024 3.11 (H) 0.57 - 1.00 mg/dL Final      Calcium Calcium   Date Value Ref Range Status   04/25/2024 8.1 (L) 8.6 - 10.5 mg/dL Final   04/24/2024 7.9 (L) 8.6 - 10.5 mg/dL Final   04/23/2024 8.3 (L) 8.6 - 10.5 mg/dL Final      PO4 No results found for: \"CAPO4\"   Albumin Albumin   Date Value Ref Range Status   04/25/2024 2.9 (L) 3.5 - 5.2 g/dL Final   04/23/2024 3.3 (L) 3.5 - 5.2 g/dL Final      Magnesium No results found for: \"MG\"     Uric Acid No results found for: \"URICACID\"     Medication Review:   arformoterol, 15 mcg, Nebulization, BID - RT  azithromycin, 250 mg, Oral, Q24H  budesonide, 0.5 mg, Nebulization, BID - RT  carvedilol, 25 mg, Oral, BID With Meals  epoetin mita/mita-epbx, 10,000 Units, Intravenous, Once per day on Monday Wednesday Friday  [START ON 4/29/2024] ergocalciferol, 50,000 Units, Oral, Q14 Days  famotidine, 20 mg, Oral, Daily  hydrALAZINE, 25 mg, Oral, Q12H  insulin lispro, 2-9 Units, Subcutaneous, 4x Daily AC & at Bedtime  ipratropium-albuterol, 3 mL, Nebulization, Once  isosorbide dinitrate, 20 mg, Oral, TID - Nitrates  methylPREDNISolone sodium succinate, 62.5 mg, Intravenous, Daily  multivitamin with minerals, 1 tablet, Oral, Daily  rosuvastatin, 20 mg, Oral, Nightly  sevelamer, 800 mg, Oral, TID With Meals  sodium chloride, 10 mL, Intravenous, Q12H          Assessment & Plan       Chronic obstructive pulmonary disease with acute exacerbation    Acute on chronic heart failure with preserved ejection fraction (HFpEF)    Essential hypertension    Anemia of chronic disease    ESRD (end stage renal disease)    COPD with acute exacerbation    COPD exacerbation    ESRD (end stage renal disease)      Assessment & Plan  - ESRD, dialysis 3 times a week, continue current schedule.  UF as tolerated.  Volume status and electrolytes " seem to be adequate.  Using her right IJ TDC until left upper extremity AV fistula is cleared for use after recent declot.  - Shortness of breath, recurrent.  Being evaluated by pulmonary and cardiology.  - Anemia of chronic kidney disease, receiving long-acting CASSANDRA as outpatient.  Iron studies are adequate.  - Diastolic dysfunction with LVH and moderate aortic stenosis.  Nitro, carvedilol and hydralazine added.  Cardiology evaluating.  - Left renal lesion with recent spontaneous right perinephric subcapsular hematoma, needs urology follow-up as outpatient.  - History of hypertension  - Type 2 diabetes with complications.  - History of lung cancer and COPD.  Seeing pulmonary.  On Zithromax and steroids.  Positive for metapneumovirus.      Elliott Aviles MD  04/25/24  10:58 EDT

## 2024-04-25 NOTE — CASE MANAGEMENT/SOCIAL WORK
RT CM went to see patient for COPD education. Ms. Rai was walking with PT at the time of my visit.      Pulmonary has recommended adding Spiriva to maintenance.  Patient is currently on Symbicort; recommend switching to Breztri and discontinuing Symbicort to combine maintenance therapy in one inhaler and only one copay.

## 2024-04-25 NOTE — THERAPY TREATMENT NOTE
Patient Name: Charlette Rai  : 1935    MRN: [id number]                              Today's Date: 2024       Admit Date: 2024    Visit Dx:     ICD-10-CM ICD-9-CM   1. Acute exacerbation of chronic obstructive pulmonary disease (COPD)  J44.1 491.21   2. ESRD (end stage renal disease)  N18.6 585.6   3. COPD exacerbation  J44.1 491.21   4. COPD with acute exacerbation  J44.1 491.21   5. Difficulty walking  R26.2 719.7     Patient Active Problem List   Diagnosis    Malignant neoplasm of upper lobe of right lung    Allergic rhinitis    Rheumatoid arthritis    Asthma    Chronic obstructive pulmonary disease with acute exacerbation    Acute on chronic heart failure with preserved ejection fraction (HFpEF)    Essential hypertension    GERD (gastroesophageal reflux disease)    Hyperlipidemia LDL goal <70    Lumbar spinal stenosis    Migraine    Monoclonal paraproteinemia    Osteopenia    Oxygen dependent    Peripheral neuropathy    Stage 4 chronic kidney disease    Type 2 diabetes mellitus    Vitamin D deficiency    Carotid artery stenosis    Chronic right-sided thoracic back pain    Acute pain of left shoulder    Peripheral vascular disease of lower extremity    Edema    Ex-smoker    Peripheral vascular disease    De Quervain's tenosynovitis    Arthritis of carpometacarpal (CMC) joint of right thumb    Arthritis of right hand    Steal syndrome of dialysis vascular access    Anemia of chronic renal failure, stage 4 (severe)    Anemia of chronic disease    Aortic stenosis, moderate    Hematoma    ESRD (end stage renal disease)    CHF (congestive heart failure)    ESRD (end stage renal disease)    Volume overload    COPD with acute exacerbation    COPD exacerbation    ESRD (end stage renal disease)     Past Medical History:   Diagnosis Date    Allergic rhinitis     Anemia     Arthritis     Asthma     USE INHALERS AND NEBULIZERS    Back pain     Bladder disorder     Cancer     LEFT LUNG CANCER 2005 SURGERY  AND CHEMO DONE  AND CURRENTLY 4/ 14/22  RIGHT LUNG CANCER HAS ONLY RECEIVED RADIATION THUS FAR    Chronic kidney disease     stage 3    CKD (chronic kidney disease) stage 4, GFR 15-29 ml/min     Condition not found     ulcer    Congestive heart failure (CHF)     FOLLOWED BY DR AQUINO. DENIES CP BUT DOES HAVE SOA CHRONIC ISSUE COPD/LUNG CANCER    COPD (chronic obstructive pulmonary disease)     FOLLOWED BY DR MARIAJOSE BAILEY    Coronary artery disease     DENIES CP BUT DOES GET SOA MOST OF THE TIME WITH EXERTION BUT OCC AT REST CHRONIC ISSUE COPD/LUNG CANCER    Deep vein thrombosis     Diabetes mellitus     DOES NOT CHECK BS DAILY    Disease of thyroid gland     HYPOTHYROIDISM    Essential hypertension     Gastric ulcer     GERD (gastroesophageal reflux disease)     Heart murmur     History of transfusion     NO ISSUES POST TRANSFUSION WAS MANY YEARS AGO    Hyperlipemia     Leukocytopenia     FOLLOWED BY DR MIR BAILEY    Limb swelling     Lumbago     Lumbar spinal stenosis     Lung cancer     Migraine headache     Multiple joint pain     On home oxygen therapy     3L/NC PRN    Osteopenia     Reflux esophagitis     Shortness of breath     Thyroid nodule     HAS ULTRASOUND YEARLY BEING MONITORED    Vascular disease     Vitamin D deficiency      Past Surgical History:   Procedure Laterality Date    ABDOMINAL SURGERY      APPENDECTOMY      ARTERIOVENOUS FISTULA/SHUNT SURGERY Left 05/10/2022    Procedure: Left basilic vein transposition;  Surgeon: Wan Barksdale MD;  Location: [facility] MAIN OR;  Service: Vascular;  Laterality: Left;    ARTERIOVENOUS FISTULA/SHUNT SURGERY Left 3/23/2023    Procedure: Ligation of left arm arteriovenous fistula;  Surgeon: Wan Barksdale MD;  Location: [facility] MAIN OR;  Service: Vascular;  Laterality: Left;    ARTERIOVENOUS FISTULA/SHUNT SURGERY Left 11/30/2023    Procedure: Creation of left arm arteriovenous graft;  Surgeon: Wan Barksdale MD;  Location: [facility] MAIN OR;  Service: Vascular;   Laterality: Left;    BRONCHOSCOPY N/A 4/24/2024    Procedure: BRONCHOSCOPY WITH BAL AND WASHINGS;  Surgeon: Khalif Yanez MD;  Location: [facility] ENDOSCOPY;  Service: Pulmonary;  Laterality: N/A;  MUCUS PLUGGING    CARDIAC CATHETERIZATION  1996    CARDIAC SURGERY      CARDIAC SURGERY      fluid drained from heart    CATARACT EXTRACTION, BILATERAL  2003    COLONOSCOPY  2014    ENDOSCOPY  2016 2019    FEMORAL ARTERY STENT Bilateral     HYSTERECTOMY      LUNG BIOPSY Left 2005    lobectomy upper lung caner    LUNG VOLUME REDUCTION      OTHER SURGICAL HISTORY      artifical joints/limbs    REPLACEMENT TOTAL KNEE Left 2016    UPPER GASTROINTESTINAL ENDOSCOPY        General Information       Row Name 04/25/24 1102          OT Time and Intention    Document Type therapy note (daily note)  -PG     Mode of Treatment individual therapy;occupational therapy  -PG               User Key  (r) = Recorded By, (t) = Taken By, (c) = Cosigned By      Initials Name Provider Type    PG Laron Mosqueda OT Occupational Therapist                     Mobility/ADL's       Row Name 04/25/24 1102          Transfers    Transfers bed-chair transfer  -PG     Comment, (Transfers) Walked short distance in hallway CGA and rwx (off 02 briefly)  -PG       Row Name 04/25/24 1102          Bed-Chair Transfer    Bed-Chair [place] (Transfers) contact guard;verbal cues  -PG               User Key  (r) = Recorded By, (t) = Taken By, (c) = Cosigned By      Initials Name Provider Type    PG Laron Mosqueda OT Occupational Therapist                   Obj/Interventions    No documentation.                  Goals/Plan    No documentation.                  Clinical Impression       Row Name 04/25/24 1103          Plan of Care Review    Progress no change  -PG               User Key  (r) = Recorded By, (t) = Taken By, (c) = Cosigned By      Initials Name Provider Type    PG Laron Mosqueda OT Occupational Therapist                   Outcome Measures       Row  Name 04/25/24 1103          How much help from another is currently needed...    Putting on and taking off regular lower body clothing? 3  -PG     Bathing (including washing, rinsing, and drying) 3  -PG     Toileting (which includes using toilet bed pan or urinal) 3  -PG     Putting on and taking off regular upper body clothing 3  -PG     Taking care of personal grooming (such as brushing teeth) 3  -PG     Eating meals 4  -PG     AM-PAC 6 Clicks Score (OT) 19  -PG       Row Name 04/25/24 1103          Functional Assessment    Outcome Measure Options AM-PAC 6 Clicks Daily Activity (OT);Optimal Instrument  -PG       Row Name 04/25/24 1103          Optimal Instrument    Optimal Instrument Optimal - 3  -PG     Bending/Stooping 2  -PG     Standing 2  -PG     Reaching 1  -PG               User Key  (r) = Recorded By, (t) = Taken By, (c) = Cosigned By      Initials Name Provider Type    PG Laron Mosqueda OT Occupational Therapist                    Occupational Therapy Education       Title: PT OT SLP Therapies (Done)       Topic: Occupational Therapy (Done)       Point: ADL training (Done)       Description:   Instruct learner(s) on proper safety adaptation and remediation techniques during self care or transfers.   Instruct in proper use of assistive devices.                  Learning Progress Summary             Patient Acceptance, E,D, DU by PG at 4/22/2024 0902                         Point: Home exercise program (Done)       Description:   Instruct learner(s) on appropriate technique for monitoring, assisting and/or progressing therapeutic exercises/activities.                  Learning Progress Summary             Patient Acceptance, E,D, DU by PG at 4/22/2024 0902                         Point: Precautions (Done)       Description:   Instruct learner(s) on prescribed precautions during self-care and functional transfers.                  Learning Progress Summary             Patient Acceptance, E,D, DU by PG at  4/22/2024 0902                         Point: Body mechanics (Done)       Description:   Instruct learner(s) on proper positioning and spine alignment during self-care, functional mobility activities and/or exercises.                  Learning Progress Summary             Patient Acceptance, E,D, DU by PG at 4/22/2024 0902                                         User Key       Initials Effective Dates Name Provider Type Discipline    PG 06/16/21 -  Laron Mosqueda, OT Occupational Therapist OT                  OT Recommendation and Plan  Planned Therapy Interventions (OT): activity tolerance training, BADL retraining, strengthening exercise, transfer/mobility retraining, patient/caregiver education/training, occupation/activity based interventions  Therapy Frequency (OT): 5 times/wk  Plan of Care Review  Plan of Care Reviewed With: patient  Progress: no change  Outcome Evaluation: Patient presents with limitations affecting strength, activity tolerance, and balance impacting patient's ability to return home safely and independently.  The skills of a therapist will be required to safely and effectively implement the following treatment plan to restore maximal level of function     Time Calculation:   Evaluation Complexity (OT)  Review Occupational Profile/Medical/Therapy History Complexity: brief/low complexity  Assessment, Occupational Performance/Identification of Deficit Complexity: 3-5 performance deficits  Clinical Decision Making Complexity (OT): problem focused assessment/low complexity  Overall Complexity of Evaluation (OT): low complexity     Time Calculation- OT       Row Name 04/25/24 1104             Time Calculation- OT    OT Received On 04/25/24  -PG      OT Goal Re-Cert Due Date 05/01/24  -PG         Timed Charges    14933 - OT Therapeutic Activity Minutes 10  -PG         Total Minutes    Timed Charges Total Minutes 10  -PG       Total Minutes 10  -PG                User Key  (r) = Recorded By, (t) =  Taken By, (c) = Cosigned By      Initials Name Provider Type    PG Laron Mosqueda OT Occupational Therapist                  Therapy Charges for Today       Code Description Service Date Service Provider Modifiers Qty    [id number] HC OT THERAPEUTIC ACT EA 15 MIN 4/25/2024 aLron Mosqueda OT GO 1                 Laron Mosqueda OT  4/25/2024

## 2024-04-26 LAB
ANION GAP SERPL CALCULATED.3IONS-SCNC: 11.4 MMOL/L (ref 5–15)
BACTERIA SPEC AEROBE CULT: NORMAL
BACTERIA SPEC AEROBE CULT: NORMAL
BUN SERPL-MCNC: 33 MG/DL (ref 8–23)
BUN/CREAT SERPL: 10.9 (ref 7–25)
CALCIUM SPEC-SCNC: 7.9 MG/DL (ref 8.6–10.5)
CHLORIDE SERPL-SCNC: 104 MMOL/L (ref 98–107)
CO2 SERPL-SCNC: 20.6 MMOL/L (ref 22–29)
CREAT SERPL-MCNC: 3.04 MG/DL (ref 0.57–1)
CYTO UR: NORMAL
EGFRCR SERPLBLD CKD-EPI 2021: 14.4 ML/MIN/1.73
GLUCOSE BLDC GLUCOMTR-MCNC: 100 MG/DL (ref 70–99)
GLUCOSE BLDC GLUCOMTR-MCNC: 127 MG/DL (ref 70–99)
GLUCOSE BLDC GLUCOMTR-MCNC: 161 MG/DL (ref 70–99)
GLUCOSE SERPL-MCNC: 119 MG/DL (ref 65–99)
LAB AP CASE REPORT: NORMAL
LAB AP CLINICAL INFORMATION: NORMAL
PATH REPORT.FINAL DX SPEC: NORMAL
PATH REPORT.GROSS SPEC: NORMAL
POTASSIUM SERPL-SCNC: 4.6 MMOL/L (ref 3.5–5.2)
SODIUM SERPL-SCNC: 136 MMOL/L (ref 136–145)

## 2024-04-26 PROCEDURE — 25010000002 EPOETIN ALFA PER 1000 UNITS: Performed by: INTERNAL MEDICINE

## 2024-04-26 PROCEDURE — 94799 UNLISTED PULMONARY SVC/PX: CPT

## 2024-04-26 PROCEDURE — 82948 REAGENT STRIP/BLOOD GLUCOSE: CPT

## 2024-04-26 PROCEDURE — 63710000001 INSULIN LISPRO (HUMAN) PER 5 UNITS: Performed by: INTERNAL MEDICINE

## 2024-04-26 PROCEDURE — 25010000002 CEFEPIME PER 500 MG: Performed by: INTERNAL MEDICINE

## 2024-04-26 PROCEDURE — 94664 DEMO&/EVAL PT USE INHALER: CPT

## 2024-04-26 PROCEDURE — 80048 BASIC METABOLIC PNL TOTAL CA: CPT | Performed by: INTERNAL MEDICINE

## 2024-04-26 PROCEDURE — 99233 SBSQ HOSP IP/OBS HIGH 50: CPT | Performed by: INTERNAL MEDICINE

## 2024-04-26 PROCEDURE — 99232 SBSQ HOSP IP/OBS MODERATE 35: CPT | Performed by: INTERNAL MEDICINE

## 2024-04-26 PROCEDURE — 82948 REAGENT STRIP/BLOOD GLUCOSE: CPT | Performed by: INTERNAL MEDICINE

## 2024-04-26 PROCEDURE — 25010000002 HEPARIN (PORCINE) PER 1000 UNITS: Performed by: INTERNAL MEDICINE

## 2024-04-26 RX ORDER — TOBRAMYCIN INHALATION SOLUTION 300 MG/5ML
300 INHALANT RESPIRATORY (INHALATION)
Status: DISCONTINUED | OUTPATIENT
Start: 2024-04-26 | End: 2024-04-29 | Stop reason: HOSPADM

## 2024-04-26 RX ADMIN — HYDRALAZINE HYDROCHLORIDE 25 MG: 50 TABLET ORAL at 21:24

## 2024-04-26 RX ADMIN — ISOSORBIDE DINITRATE 20 MG: 20 TABLET ORAL at 17:45

## 2024-04-26 RX ADMIN — IPRATROPIUM BROMIDE AND ALBUTEROL SULFATE 3 ML: .5; 3 SOLUTION RESPIRATORY (INHALATION) at 19:16

## 2024-04-26 RX ADMIN — CEFEPIME 2000 MG: 2 INJECTION, POWDER, FOR SOLUTION INTRAVENOUS at 17:45

## 2024-04-26 RX ADMIN — Medication 5 MG: at 21:24

## 2024-04-26 RX ADMIN — Medication 1 TABLET: at 12:33

## 2024-04-26 RX ADMIN — SEVELAMER CARBONATE 800 MG: 800 TABLET, FILM COATED ORAL at 17:44

## 2024-04-26 RX ADMIN — ISOSORBIDE DINITRATE 20 MG: 20 TABLET ORAL at 15:14

## 2024-04-26 RX ADMIN — INSULIN LISPRO 2 UNITS: 100 INJECTION, SOLUTION INTRAVENOUS; SUBCUTANEOUS at 17:44

## 2024-04-26 RX ADMIN — FAMOTIDINE 20 MG: 20 TABLET ORAL at 12:33

## 2024-04-26 RX ADMIN — HYDRALAZINE HYDROCHLORIDE 25 MG: 50 TABLET ORAL at 12:33

## 2024-04-26 RX ADMIN — ARFORMOTEROL TARTRATE 15 MCG: 15 SOLUTION RESPIRATORY (INHALATION) at 07:06

## 2024-04-26 RX ADMIN — CEFEPIME 2000 MG: 2 INJECTION, POWDER, FOR SOLUTION INTRAVENOUS at 12:33

## 2024-04-26 RX ADMIN — ROSUVASTATIN CALCIUM 20 MG: 20 TABLET, FILM COATED ORAL at 21:25

## 2024-04-26 RX ADMIN — ERYTHROPOIETIN 10000 UNITS: 10000 INJECTION, SOLUTION INTRAVENOUS; SUBCUTANEOUS at 11:52

## 2024-04-26 RX ADMIN — BUDESONIDE 0.5 MG: 0.5 SUSPENSION RESPIRATORY (INHALATION) at 19:16

## 2024-04-26 RX ADMIN — Medication 10 ML: at 12:34

## 2024-04-26 RX ADMIN — BUDESONIDE 0.5 MG: 0.5 SUSPENSION RESPIRATORY (INHALATION) at 07:06

## 2024-04-26 RX ADMIN — CARVEDILOL 25 MG: 25 TABLET, FILM COATED ORAL at 17:44

## 2024-04-26 RX ADMIN — ACETAMINOPHEN 650 MG: 325 TABLET ORAL at 21:24

## 2024-04-26 RX ADMIN — ARFORMOTEROL TARTRATE 15 MCG: 15 SOLUTION RESPIRATORY (INHALATION) at 19:16

## 2024-04-26 RX ADMIN — TOBRAMYCIN 300 MG: 300 SOLUTION ORAL at 19:16

## 2024-04-26 RX ADMIN — CARVEDILOL 25 MG: 25 TABLET, FILM COATED ORAL at 12:35

## 2024-04-26 RX ADMIN — SEVELAMER CARBONATE 800 MG: 800 TABLET, FILM COATED ORAL at 12:33

## 2024-04-26 RX ADMIN — Medication 10 ML: at 21:25

## 2024-04-26 RX ADMIN — HEPARIN SODIUM 3400 UNITS: 1000 INJECTION INTRAVENOUS; SUBCUTANEOUS at 11:52

## 2024-04-26 NOTE — PROGRESS NOTES
[facility]     Progress Note    Patient Name: Charlette Rai  : 1935  MRN: [id number]  Primary Care Physician:  Rafael Lyons MD  Date of admission: 2024    Subjective   Subjective     Chief Complaint: With end-stage kidney disease also has history of lung cancer admitted because of shortness of air pneumonia antibiotics are being given    HPI: Able and doing well not in acute distress    Review of Systems   All systems were reviewed and negative except for: Has been reviewed    Objective   Objective     Vitals:   Temp:  [97.7 °F (36.5 °C)-98.4 °F (36.9 °C)] 97.7 °F (36.5 °C)  Heart Rate:  [71-87] 71  Resp:  [14-18] 14  BP: (122-177)/(46-65) 168/57  Flow (L/min):  [3] 3    Physical Exam    Constitutional: Awake, alert   Eyes: PERRLA, sclerae anicteric, no conjunctival injection   HENT: NCAT, mucous membranes moist   Neck: Supple, no thyromegaly, no lymphadenopathy, trachea midline   Respiratory: Clear to auscultation bilaterally, nonlabored respirations    Cardiovascular: RRR, no murmurs, rubs, or gallops, palpable pedal pulses bilaterally   Gastrointestinal: Positive bowel sounds, soft, nontender, nondistended   Musculoskeletal: No bilateral ankle edema, no clubbing or cyanosis to extremities   Psychiatric: Appropriate affect, cooperative   Neurologic: Oriented x 3, strength symmetric in all extremities, Cranial Nerves grossly intact to confrontation, speech clear   Skin: No rashes   No change  Result Review    Result Review:  I have personally reviewed the results from the time of this admission to 2024 10:01 EDT and agree with these findings:  [x]  Laboratory  []  Microbiology  []  Radiology  []  EKG/Telemetry   []  Cardiology/Vascular   []  Pathology  []  Old records  []  Other:  Most notable findings include: No new finding    Assessment & Plan   Assessment / Plan     Brief Patient Summary:  Charlette Rai is a 89 y.o. female who patient stable getting dialysis    Active Hospital  Problems:  Active Hospital Problems    Diagnosis     ESRD (end stage renal disease)     COPD exacerbation     COPD with acute exacerbation     ESRD (end stage renal disease)     Anemia of chronic disease     Essential hypertension     Acute on chronic heart failure with preserved ejection fraction (HFpEF)     Chronic obstructive pulmonary disease with acute exacerbation        Plan:   Continue the current management    DVT prophylaxis:  Medical and mechanical DVT prophylaxis orders are present.        CODE STATUS:   Code Status (Patient has no pulse and is not breathing): CPR (Attempt to Resuscitate)  Medical Interventions (Patient has pulse or is breathing): Full Support    Disposition:  I expect patient to be discharged after the patient has been stabilized.    Electronically signed by Benji Barragan MD, 04/26/24, 10:01 AM EDT.      Part of this note may be an electronic transcription/translation of spoken language to printed text using the Dragon Dictation System.

## 2024-04-26 NOTE — PLAN OF CARE
Goal Outcome Evaluation:           Progress: no change  Outcome Evaluation: Vss. No acute changes. Rested well during shift. Waiting for transport to get pt for dialysis.

## 2024-04-26 NOTE — PROGRESS NOTES
CARDIOLOGY  INPATIENT PROGRESS NOTE                [address]    4/26/2024      PATIENT IDENTIFICATION:   Name:  Charlette Rai      MRN:  [id number]     89 y.o.  female                 SUBJECTIVE:   Patient feeling better today has less shortness of breath issues  OBJECTIVE:  Vitals:    04/26/24 0800 04/26/24 0830 04/26/24 0900 04/26/24 0930   BP: 158/58 163/56 177/65 168/57   BP Location: Right arm Right arm Right arm Right arm   Patient Position: Lying Lying Lying Lying   Pulse: 76 73 77 71   Resp: 14 14 14 14   Temp:       TempSrc:       SpO2:       Weight:       Height:               Body mass index is 21.9 kg/m².    Intake/Output Summary (Last 24 hours) at 4/26/2024 1001  Last data filed at 4/26/2024 0558  Gross per 24 hour   Intake 480 ml   Output 100 ml   Net 380 ml         Physical Exam  General Appearance:   no acute distress  Alert and oriented x3  HENT:   lips not cyanotic  Atraumatic  Neck:  No jvd   supple  Respiratory:  no respiratory distress  normal breath sounds  no rales  Cardiovascular:    regular rhythm  no S3, no S4   2/6 systolic mid peaking murmur  no rub  lower extremity edema: Trace  Skin:   warm, dry  No rashes      No Known Allergies  Scheduled meds:  arformoterol, 15 mcg, Nebulization, BID - RT  budesonide, 0.5 mg, Nebulization, BID - RT  carvedilol, 25 mg, Oral, BID With Meals  cefepime, 2,000 mg, Intravenous, Once  cefepime, 2,000 mg, Intravenous, Q24H  epoetin mita/mita-epbx, 10,000 Units, Intravenous, Once per day on Monday Wednesday Friday  [START ON 4/29/2024] ergocalciferol, 50,000 Units, Oral, Q14 Days  famotidine, 20 mg, Oral, Daily  hydrALAZINE, 25 mg, Oral, Q12H  insulin lispro, 2-9 Units, Subcutaneous, 4x Daily AC & at Bedtime  ipratropium-albuterol, 3 mL, Nebulization, Once  isosorbide dinitrate, 20 mg, Oral, TID - Nitrates  multivitamin with minerals, 1 tablet, Oral, Daily  rosuvastatin, 20 mg, Oral, Nightly  sevelamer, 800 mg, Oral, TID With  "Meals  sodium chloride, 10 mL, Intravenous, Q12H  tobramycin PF, 300 mg, Nebulization, Q12H - RT      IV meds:                      lactated ringers, 30 mL/hr, Last Rate: Stopped (04/24/24 1759)  Pharmacy to Dose Cefepime,   sodium chloride, 50 mL/hr, Last Rate: Stopped (04/24/24 1519)      Data Review:  CBC          4/22/2024    05:18 4/23/2024    05:52 4/25/2024    06:17   CBC   WBC 5.25  9.76  7.19    RBC 2.73  2.71  2.76    Hemoglobin 8.3  8.1  8.2    Hematocrit 27.3  26.8  28.3    .0  98.9  102.5    MCH 30.4  29.9  29.7    MCHC 30.4  30.2  29.0    RDW 14.7  15.2  15.3    Platelets 152  183  174      CMP          4/24/2024    06:03 4/25/2024    06:17 4/26/2024    05:53   CMP   Glucose 105  89  119    BUN 49  20  33    Creatinine 3.77  2.33  3.04    EGFR 11.1  19.8  14.4    Sodium 136  141  136    Potassium 4.1  4.0  4.6    Chloride 100  108  104    Calcium 7.9  8.1  7.9    Albumin  2.9     BUN/Creatinine Ratio 13.0  8.6  10.9    Anion Gap 13.2  8.2  11.4       CARDIAC LABS:      Lab 04/21/24  1552 04/21/24  1350   PROBNP  --  7,318.0*   HSTROP T 66* 68*        No results found for: \"DIGOXIN\"   Lab Results   Component Value Date    TSH 1.650 10/29/2023           Invalid input(s): \"LDLCALC\"  No results found for: \"POCTROP\"  Lab Results   Component Value Date    TROPONINT 66 (C) 04/21/2024   (  Lab Results   Component Value Date    MG 1.8 04/22/2024     Results for orders placed during the hospital encounter of 10/29/23    Adult Transthoracic Echo Complete W/ Cont if Necessary Per Protocol    Interpretation Summary    Left ventricular systolic function is normal. Calculated left ventricular EF = 56.8%    Left ventricular wall thickness is consistent with mild asymmetric hypertrophy.    Left ventricular diastolic function is consistent with (grade I) impaired relaxation.    Left atrial volume is moderately increased.    Moderate aortic valve stenosis is present.    Estimated right ventricular systolic " pressure from tricuspid regurgitation is normal (<35 mmHg).    Mild dilation of the aortic root is present.           ASSESSMENT:    Acute on chronic heart failure with preserved ejection fraction (HFpEF)    Essential hypertension    Aortic stenosis, moderate    Chronic obstructive pulmonary disease with acute exacerbation    Anemia of chronic disease    ESRD (end stage renal disease)    COPD with acute exacerbation    COPD exacerbation    ESRD (end stage renal disease)        PLAN:  1.  Recommend up titration of hydralazine to 50 twice daily for better blood pressure control as long as she does not have any hypotension during dialysis  2.  Continue with hemodialysis for volume maintenance issues will sign off          León Sanchez MD  4/26/2024    10:01 EDT

## 2024-04-26 NOTE — PROGRESS NOTES
Pulmonary / Critical Care Progress Note      Patient Name: Charlette Rai  : 1935  MRN: [id number]  Primary Care Physician:  Rafael Lyons MD  Date of admission: 2024    Subjective   Subjective   Follow-up for persistent bronchitis, respiratory failure, failure with airway clearance, ESRD on dialysis.    Over past 24 hours: Continued to have difficulty with airway clearance.  Continued with nebulizers, incentive spirometer and flutter valve.  Bronchoscopy culture grew Pseudomonas.    No acute events overnight.     This morning,   Feels better with breathing.  Shortness of breath is improving.  Cough and phlegm is improving  No nausea or vomiting.  No fever or chills.  During dialysis this morning    Review of Systems  General:  Fatigue, No Fever  HEENT: No dysphagia, No Visual Changes, no rhinorrhea  Respiratory:  + cough improving,+Dyspnea, no phlegm, No Pleuritic Pain, no wheezing, no hemoptysis  Cardiovascular: Denies chest pain, denies palpitations,+HAIDER, No Chest Pressure  Gastrointestinal:  No Abdominal Pain, No Nausea, No Vomiting, No Diarrhea  Genitourinary:  No Dysuria, No Frequency, No Hesitancy  Musculoskeletal: No muscle pain or swelling  Endocrine:  No Heat Intolerance, No Cold Intolerance  Neurologic:  No Confusion, no Dysarthria, No Headaches  Skin:  No Rash, No Open Wounds          Objective   Objective     Vitals:   Temp:  [97.7 °F (36.5 °C)-98.4 °F (36.9 °C)] 97.7 °F (36.5 °C)  Heart Rate:  [75-87] 78  Resp:  [16-18] 16  BP: (122-163)/(46-65) 162/61  Flow (L/min):  [3] 3  Physical Exam   Vital Signs Reviewed   General:  WDWN, Alert, mild distress, has conversational dyspnea.    HEENT:  PERRL, EOMI.  OP, nares clear, no sinus tenderness  Neck:  Supple, no JVD, no thyromegaly  Lymph: no axillary, cervical, supraclavicular lymphadenopathy noted bilaterally  Chest: Poor aeration, minimal scattered rhonchi, no wheezing, bibasilar crackles, resonant to percussion bilaterally, TDC in  place  CV: RRR, no MGR, pulses 2+, equal.  Abd:  Soft, NT, ND, + BS, no HSM  EXT:  no clubbing, no cyanosis, trace lower extremity edema, no joint tenderness  Neuro:  A&Ox3, CN grossly intact, no focal deficits.  Skin: No rashes or lesions noted      Result Review    Result Review:  I have personally reviewed the results from the time of this admission to 4/26/2024 08:06 EDT and agree with these findings:  [x]  Laboratory  [x]  Microbiology  [x]  Radiology  [x]  EKG/Telemetry   [x]  Cardiology/Vascular   []  Pathology  []  Old records  []  Other:  Most notable findings include:         Lab 04/26/24  0553 04/25/24  0617 04/24/24  0603 04/23/24  0552 04/22/24  0518 04/21/24  1350   WBC  --  7.19  --  9.76 5.25 7.66   HEMOGLOBIN  --  8.2*  --  8.1* 8.3* 8.5*   HEMATOCRIT  --  28.3*  --  26.8* 27.3* 28.2*   PLATELETS  --  174  --  183 152 150   SODIUM 136 141 136 136 139 138   POTASSIUM 4.6 4.0 4.1 4.2 4.2 4.2   CHLORIDE 104 108* 100 101 102 100   CO2 20.6* 24.8 22.8 23.3 22.1 25.3   BUN 33* 20 49* 33* 55* 48*   CREATININE 3.04* 2.33* 3.77* 3.11* 4.05* 4.21*   GLUCOSE 119* 89 105* 110* 239* 148*   CALCIUM 7.9* 8.1* 7.9* 8.3* 7.9* 7.9*   PHOSPHORUS  --  2.8  --  3.4 4.3  --    TOTAL PROTEIN  --   --   --   --   --  5.8*   ALBUMIN  --  2.9*  --  3.3*  --  3.3*   GLOBULIN  --   --   --   --   --  2.5            Assessment & Plan   Assessment / Plan     Active Hospital Problems:  Active Hospital Problems    Diagnosis     ESRD (end stage renal disease)     COPD exacerbation     COPD with acute exacerbation     ESRD (end stage renal disease)     Anemia of chronic disease     Essential hypertension     Acute on chronic heart failure with preserved ejection fraction (HFpEF)     Chronic obstructive pulmonary disease with acute exacerbation          Impression:   Diastolic heart failure with acute exacerbation  COPD with acute exacerbation  End-stage renal disease on dialysis  History of smoking in past  Persistent cough  Viral  bronchitis with human metapneumovirus    Plan:   Respiratory viral panel was positive for human metapneumovirus.  Continue with Brovana and Pulmicort nebulizers twice daily.  Was on Symbicort at home.  Will likely need scheduled DuoNeb 4 times daily for home.  Continue DuoNeb nebulizer 4 times a day  Continue chest vest, likely secretions obstructing right bronchus intermedius.  Status post bronchoscopy with mucous plug removal.  She is feeling better.  Follow cultures, will likely need prolonged antibiotics and steroids.  Continue dialysis per nephrology service.  Bronchoscopy culture growing Pseudomonas.  Start IV cefepime.  Will need 14 days of antibiotics.  Start JUAN neb.  RT case management consult.  Will need nebulizers, likely chest vest for home too.   Cardiac meds including Coreg, hydralazine per primary service and cardiology service.  Continue azithromycin 250 mg once daily.     Ms Rai  has a history of bronchiectasis visualized by CT 04/22/2024 and daily productive cough for more than six months. Ms Rai has had ongoing issues with airway clearance and mucous plugging despite use of Aerobika OPEP.  Other treatments  have failed to mobilize secretions leading to recurrent hospitalizations (at least four since October 2023) and bronchoscopy. She will need to have HFCWO at home to loosen and help mobilize her secretions.     DVT prophylaxis:  Medical and mechanical DVT prophylaxis orders are present.        CODE STATUS:   Code Status (Patient has no pulse and is not breathing): CPR (Attempt to Resuscitate)  Medical Interventions (Patient has pulse or is breathing): Full Support      I personally reviewed pertinent labs, imaging and provider notes. Discussed with bedside nurse and will discuss with primary service.       Electronically signed by Khalif Yanez MD, 4/26/2024, 08:06 EDT.

## 2024-04-26 NOTE — PROGRESS NOTES
" LOS: 4 days   Patient Care Team:  Rafael Lyons MD as PCP - General (Internal Medicine)  Regis Mckeon MD as Consulting Physician (Radiation Oncology)  Carlton Casillas MD as Consulting Physician (Gastroenterology)  Susan Marcos APRN as Nurse Practitioner (Gastroenterology)    Chief Complaint: ESRD    Subjective     History of Present Illness  Patient seen earlier on dialysis.  Feeling much better.  Still mild intermittent cough.  She is less short of breath.  Tolerating p.o.  No new events.    Subjective:  Symptoms:  Improved.  She reports cough.  No shortness of breath, chest pain, weakness, chest pressure or anxiety.    Diet:  Adequate intake.  No nausea or vomiting.    Pain:  She reports no pain.        History taken from: patient chart    Objective     Vital Sign Min/Max for last 24 hours  Temp  Min: 97.7 °F (36.5 °C)  Max: 98 °F (36.7 °C)   BP  Min: 122/46  Max: 177/57   Pulse  Min: 71  Max: 87   Resp  Min: 14  Max: 18   SpO2  Min: 90 %  Max: 100 %   Flow (L/min)  Min: 3  Max: 3   Weight  Min: 59.7 kg (131 lb 9.8 oz)  Max: 59.7 kg (131 lb 9.8 oz)     Flowsheet Rows      Flowsheet Row First Filed Value   Admission Height 165.1 cm (65\") Documented at 04/21/2024 1328   Admission Weight 61.6 kg (135 lb 12.9 oz) Documented at 04/21/2024 1328            I/O this shift:  In: -   Out: 1000   I/O last 3 completed shifts:  In: 480 [P.O.:480]  Out: 250 [Urine:250]    Objective:  General Appearance:  Comfortable, in no acute distress and not in pain.    Vital signs: (most recent): Blood pressure 171/60, pulse 78, temperature 97.7 °F (36.5 °C), temperature source Oral, resp. rate 14, height 165.1 cm (65\"), weight 59.7 kg (131 lb 9.8 oz), SpO2 95%, not currently breastfeeding.  Vital signs are normal.  No fever.    HEENT: Normal HEENT exam.    Lungs:  Normal effort and normal respiratory rate.  She is not in respiratory distress.  There are decreased breath sounds and rhonchi.    Heart: Normal rate.  " "Regular rhythm.    Chest: (Right IJ TDC.)  Abdomen: Abdomen is soft.  Bowel sounds are normal.   There is no abdominal tenderness.     Extremities: Normal range of motion.  There is no dependent edema.  (Patent left upper extremity AV fistula)  Pulses: Distal pulses are intact.    Neurological: Patient is alert and oriented to person, place and time.    Pupils:  Pupils are equal, round, and reactive to light.    Skin:  Warm, dry and pale.                Results Review:     I reviewed the patient's new clinical results.  I reviewed the patient's new imaging results and agree with the interpretation.  I reviewed the patient's other test results and agree with the interpretation    WBC WBC   Date Value Ref Range Status   04/25/2024 7.19 3.40 - 10.80 10*3/mm3 Final      HGB Hemoglobin   Date Value Ref Range Status   04/25/2024 8.2 (L) 12.0 - 15.9 g/dL Final      HCT Hematocrit   Date Value Ref Range Status   04/25/2024 28.3 (L) 34.0 - 46.6 % Final      Platlets No results found for: \"LABPLAT\"   MCV MCV   Date Value Ref Range Status   04/25/2024 102.5 (H) 79.0 - 97.0 fL Final          Sodium Sodium   Date Value Ref Range Status   04/26/2024 136 136 - 145 mmol/L Final   04/25/2024 141 136 - 145 mmol/L Final   04/24/2024 136 136 - 145 mmol/L Final      Potassium Potassium   Date Value Ref Range Status   04/26/2024 4.6 3.5 - 5.2 mmol/L Final   04/25/2024 4.0 3.5 - 5.2 mmol/L Final   04/24/2024 4.1 3.5 - 5.2 mmol/L Final      Chloride Chloride   Date Value Ref Range Status   04/26/2024 104 98 - 107 mmol/L Final   04/25/2024 108 (H) 98 - 107 mmol/L Final   04/24/2024 100 98 - 107 mmol/L Final      CO2 CO2   Date Value Ref Range Status   04/26/2024 20.6 (L) 22.0 - 29.0 mmol/L Final   04/25/2024 24.8 22.0 - 29.0 mmol/L Final   04/24/2024 22.8 22.0 - 29.0 mmol/L Final      BUN BUN   Date Value Ref Range Status   04/26/2024 33 (H) 8 - 23 mg/dL Final   04/25/2024 20 8 - 23 mg/dL Final   04/24/2024 49 (H) 8 - 23 mg/dL Final    " "  Creatinine Creatinine   Date Value Ref Range Status   04/26/2024 3.04 (H) 0.57 - 1.00 mg/dL Final   04/25/2024 2.33 (H) 0.57 - 1.00 mg/dL Final   04/24/2024 3.77 (H) 0.57 - 1.00 mg/dL Final      Calcium Calcium   Date Value Ref Range Status   04/26/2024 7.9 (L) 8.6 - 10.5 mg/dL Final   04/25/2024 8.1 (L) 8.6 - 10.5 mg/dL Final   04/24/2024 7.9 (L) 8.6 - 10.5 mg/dL Final      PO4 No results found for: \"CAPO4\"   Albumin Albumin   Date Value Ref Range Status   04/25/2024 2.9 (L) 3.5 - 5.2 g/dL Final      Magnesium No results found for: \"MG\"     Uric Acid No results found for: \"URICACID\"     Medication Review:   arformoterol, 15 mcg, Nebulization, BID - RT  budesonide, 0.5 mg, Nebulization, BID - RT  carvedilol, 25 mg, Oral, BID With Meals  cefepime, 2,000 mg, Intravenous, Q24H  epoetin mita/mita-epbx, 10,000 Units, Intravenous, Once per day on Monday Wednesday Friday  [START ON 4/29/2024] ergocalciferol, 50,000 Units, Oral, Q14 Days  famotidine, 20 mg, Oral, Daily  hydrALAZINE, 25 mg, Oral, Q12H  insulin lispro, 2-9 Units, Subcutaneous, 4x Daily AC & at Bedtime  ipratropium-albuterol, 3 mL, Nebulization, Once  isosorbide dinitrate, 20 mg, Oral, TID - Nitrates  multivitamin with minerals, 1 tablet, Oral, Daily  rosuvastatin, 20 mg, Oral, Nightly  sevelamer, 800 mg, Oral, TID With Meals  sodium chloride, 10 mL, Intravenous, Q12H  tobramycin PF, 300 mg, Nebulization, Q12H - RT          Assessment & Plan       Chronic obstructive pulmonary disease with acute exacerbation    Acute on chronic heart failure with preserved ejection fraction (HFpEF)    Essential hypertension    Anemia of chronic disease    Aortic stenosis, moderate    ESRD (end stage renal disease)    COPD with acute exacerbation    COPD exacerbation    ESRD (end stage renal disease)      Assessment & Plan  - ESRD, dialysis 3 [facility] a week at [facility], continue current schedule.  Volume status is adequate.  Electrolytes are stable.  Using her " right IJ TDC until left upper extremity AV fistula is cleared for use after recent declot.  - Shortness of breath, recurrent.  Improved significantly.  Followed by pulmonary and cardiology.   - Anemia of chronic kidney disease, receiving long-acting CASSANDRA as outpatient.  Iron studies are adequate.  - Diastolic dysfunction with LVH and moderate aortic stenosis.  Nitro, carvedilol and hydralazine added.  Per cardiology.  - Left renal lesion with recent spontaneous right perinephric subcapsular hematoma, needs urology follow-up as outpatient.  - History of hypertension  - Type 2 diabetes with complications.  - History of lung cancer and COPD.  Seeing pulmonary.  On Zithromax and steroids.  Positive for metapneumovirus.  Status post bronchoscopy and mucous plugging.  Discussed with dialysis staff.  Will follow.    Edi García MD  04/26/24  17:08 EDT

## 2024-04-26 NOTE — CONSULTS
RT CM consulted to arrange home chest vest for Ms. Rai.      PA submitted for Tobramycin:  Key: [id number] without authorization.  Tobramycin nebulizer will likely have an extremely high copay; please send the Rx to [facility] specialty pharmacy at [address] [place] [address]. Patient can apply for financial assistance with [employer].     Ms Rai  has a history of bronchiectasis visualized by CT 04/22/2024 and daily productive cough for more than six months. Ms Rai has had ongoing issues with airway clearance and mucous plugging despite use of Aerobika OPEP.  Other treatments  have failed to mobilize secretions leading to recurrent hospitalizations (at least four since October 2023) and bronchoscopy. She will need to have HFCWO at home to loosen and help mobilize her secretions.

## 2024-04-26 NOTE — PLAN OF CARE
Goal Outcome Evaluation:  Plan of Care Reviewed With: patient        Progress: improving       VSS throughout shift, somewhat hypertensive resolved with scheduled meds. Pt went to dialysis today, 1L off per MD. Tolerated well. Abx administered as ordered. She has been pleasant, no complaints voiced. Will cont POC

## 2024-04-26 NOTE — DISCHARGE INSTR - OTHER ORDERS
Your Rx for Tobramycin nebulizer solution has been sent to [facility] specialty pharmacy. The medication will be shipped to you from [facility] specialty pharmacy. If you go to rehab prior to returning home, please call [facility] to let them know when to deliver your medication. Should you have a high copay, call [facility] reimbursement counselor to apply for assistance at [phone].

## 2024-04-26 NOTE — NURSING NOTE
4 hour dialysis tx completed at 1150. 1L removed and 96BLP.     Pt tolerated tx well without difficulty.    Post tx cath flushed, hep locked, curos caps applied and gauze wrapped.    Pt is alert and oriented on stable condition at time of departure with Tiffany KHAN.    Report given to Jenny KHAN.

## 2024-04-27 LAB
GLUCOSE BLDC GLUCOMTR-MCNC: 123 MG/DL (ref 70–99)
GLUCOSE BLDC GLUCOMTR-MCNC: 133 MG/DL (ref 70–99)
GLUCOSE BLDC GLUCOMTR-MCNC: 93 MG/DL (ref 70–99)
GLUCOSE BLDC GLUCOMTR-MCNC: 98 MG/DL (ref 70–99)

## 2024-04-27 PROCEDURE — 82948 REAGENT STRIP/BLOOD GLUCOSE: CPT | Performed by: INTERNAL MEDICINE

## 2024-04-27 PROCEDURE — 94664 DEMO&/EVAL PT USE INHALER: CPT

## 2024-04-27 PROCEDURE — 94799 UNLISTED PULMONARY SVC/PX: CPT

## 2024-04-27 PROCEDURE — 25010000002 CEFEPIME PER 500 MG: Performed by: INTERNAL MEDICINE

## 2024-04-27 PROCEDURE — 99233 SBSQ HOSP IP/OBS HIGH 50: CPT | Performed by: INTERNAL MEDICINE

## 2024-04-27 PROCEDURE — 82948 REAGENT STRIP/BLOOD GLUCOSE: CPT

## 2024-04-27 RX ORDER — AZITHROMYCIN 250 MG/1
250 TABLET, FILM COATED ORAL
Status: DISCONTINUED | OUTPATIENT
Start: 2024-04-27 | End: 2024-04-29 | Stop reason: HOSPADM

## 2024-04-27 RX ADMIN — Medication 10 ML: at 20:05

## 2024-04-27 RX ADMIN — ARFORMOTEROL TARTRATE 15 MCG: 15 SOLUTION RESPIRATORY (INHALATION) at 20:11

## 2024-04-27 RX ADMIN — TOBRAMYCIN 300 MG: 300 SOLUTION ORAL at 20:11

## 2024-04-27 RX ADMIN — ARFORMOTEROL TARTRATE 15 MCG: 15 SOLUTION RESPIRATORY (INHALATION) at 06:42

## 2024-04-27 RX ADMIN — ISOSORBIDE DINITRATE 20 MG: 20 TABLET ORAL at 08:05

## 2024-04-27 RX ADMIN — Medication 10 ML: at 08:05

## 2024-04-27 RX ADMIN — ISOSORBIDE DINITRATE 20 MG: 20 TABLET ORAL at 12:41

## 2024-04-27 RX ADMIN — SEVELAMER CARBONATE 800 MG: 800 TABLET, FILM COATED ORAL at 12:41

## 2024-04-27 RX ADMIN — ISOSORBIDE DINITRATE 20 MG: 20 TABLET ORAL at 18:00

## 2024-04-27 RX ADMIN — BUDESONIDE 0.5 MG: 0.5 SUSPENSION RESPIRATORY (INHALATION) at 06:42

## 2024-04-27 RX ADMIN — Medication 5 MG: at 20:04

## 2024-04-27 RX ADMIN — SEVELAMER CARBONATE 800 MG: 800 TABLET, FILM COATED ORAL at 18:00

## 2024-04-27 RX ADMIN — HYDRALAZINE HYDROCHLORIDE 25 MG: 50 TABLET ORAL at 20:04

## 2024-04-27 RX ADMIN — TOBRAMYCIN 300 MG: 300 SOLUTION ORAL at 06:42

## 2024-04-27 RX ADMIN — Medication 1 TABLET: at 08:04

## 2024-04-27 RX ADMIN — AZITHROMYCIN DIHYDRATE 250 MG: 250 TABLET ORAL at 12:41

## 2024-04-27 RX ADMIN — SEVELAMER CARBONATE 800 MG: 800 TABLET, FILM COATED ORAL at 08:04

## 2024-04-27 RX ADMIN — BUDESONIDE 0.5 MG: 0.5 SUSPENSION RESPIRATORY (INHALATION) at 20:11

## 2024-04-27 RX ADMIN — IPRATROPIUM BROMIDE AND ALBUTEROL SULFATE 3 ML: .5; 3 SOLUTION RESPIRATORY (INHALATION) at 05:05

## 2024-04-27 RX ADMIN — IPRATROPIUM BROMIDE AND ALBUTEROL SULFATE 3 ML: .5; 3 SOLUTION RESPIRATORY (INHALATION) at 20:10

## 2024-04-27 RX ADMIN — CEFEPIME 2000 MG: 2 INJECTION, POWDER, FOR SOLUTION INTRAVENOUS at 18:00

## 2024-04-27 RX ADMIN — ROSUVASTATIN CALCIUM 20 MG: 20 TABLET, FILM COATED ORAL at 20:04

## 2024-04-27 RX ADMIN — FAMOTIDINE 20 MG: 20 TABLET ORAL at 08:04

## 2024-04-27 RX ADMIN — ACETAMINOPHEN 650 MG: 325 TABLET ORAL at 08:13

## 2024-04-27 RX ADMIN — ACETAMINOPHEN 650 MG: 325 TABLET ORAL at 20:04

## 2024-04-27 NOTE — PROGRESS NOTES
[facility]     Progress Note    Patient Name: Charlette Rai  : 1935  MRN: [id number]  Primary Care Physician:  Rafael Lyons MD  Date of admission: 2024    Subjective   Subjective     Chief Complaint: Patient with recurrent pneumonia with mucous impaction history of lung cancer status post SBRT and previous lobectomy, she has been growing Pseudomonas and has been on antibiotics cefepime which will be continued    HPI: With Pseudomonas pneumonia with mucus impaction will continue IV antibiotic    Review of Systems   All systems were reviewed and negative except for: Has been reviewed    Objective   Objective     Vitals:   Temp:  [97.3 °F (36.3 °C)-98.1 °F (36.7 °C)] 97.7 °F (36.5 °C)  Heart Rate:  [68-85] 85  Resp:  [16-18] 18  BP: (117-147)/(41-53) 139/44  Flow (L/min):  [3] 3    Physical Exam    Constitutional: Awake, alert   Eyes: PERRLA, sclerae anicteric, no conjunctival injection   HENT: NCAT, mucous membranes moist   Neck: Supple, no thyromegaly, no lymphadenopathy, trachea midline   Respiratory: Clear to auscultation bilaterally, nonlabored respirations    Cardiovascular: RRR, no murmurs, rubs, or gallops, palpable pedal pulses bilaterally   Gastrointestinal: Positive bowel sounds, soft, nontender, nondistended   Musculoskeletal: No bilateral ankle edema, no clubbing or cyanosis to extremities   Psychiatric: Appropriate affect, cooperative   Neurologic: Oriented x 3, strength symmetric in all extremities, Cranial Nerves grossly intact to confrontation, speech clear   Skin: No rashes   No change  Result Review    Result Review:  I have personally reviewed the results from the time of this admission to 2024 12:48 EDT and agree with these findings:  [x]  Laboratory  [x]  Microbiology  [x]  Radiology  []  EKG/Telemetry   []  Cardiology/Vascular   []  Pathology  []  Old records  []  Other:  Most notable findings include: Has been reviewed and discussed    Assessment & Plan   Assessment /  Plan     Brief Patient Summary:  Charlette Rai is a 89 y.o. female who history of lung cancer with lobectomy and SBRT with Pseudomonas pneumonia    Active Hospital Problems:  Active Hospital Problems    Diagnosis     ESRD (end stage renal disease)     COPD exacerbation     COPD with acute exacerbation     ESRD (end stage renal disease)     Aortic stenosis, moderate     Anemia of chronic disease     Essential hypertension     Acute on chronic heart failure with preserved ejection fraction (HFpEF)     Chronic obstructive pulmonary disease with acute exacerbation        Plan:   Continue IV antibiotics nebulizer treatment after the patient has been stabilized    DVT prophylaxis:  Medical and mechanical DVT prophylaxis orders are present.        CODE STATUS:   Code Status (Patient has no pulse and is not breathing): CPR (Attempt to Resuscitate)  Medical Interventions (Patient has pulse or is breathing): Full Support    Disposition:  I expect patient to be discharged patient still requiring nebulizers and IV antibiotics.    Electronically signed by Benji Barragan MD, 04/27/24, 12:48 PM EDT.      Part of this note may be an electronic transcription/translation of spoken language to printed text using the Dragon Dictation System.

## 2024-04-27 NOTE — PLAN OF CARE
Goal Outcome Evaluation:  Plan of Care Reviewed With: patient        Progress: no change  Outcome Evaluation: Pt vss. Pt had no c/o pain. Possible dc tomorrow. Continue plan of care

## 2024-04-27 NOTE — PLAN OF CARE
Goal Outcome Evaluation:  Plan of Care Reviewed With: patient        Progress: no change  Outcome Evaluation: Vss. No acute changes. Pt rested well during shift. Will continue plan of care.

## 2024-04-27 NOTE — PROGRESS NOTES
[facility] Progress Note      Patient Name: Charlette Rai  : 1935  MRN: [id number]  Primary Care Physician:  Rafael Lyons MD  Date of admission: 2024    Subjective     Interval History:   F/u ESRD     Review of Systems:   Dialyzed yesterday, 1.1L removed.    Breathing better in general     Objective     Vitals:   Temp:  [97.3 °F (36.3 °C)-98.1 °F (36.7 °C)] 97.9 °F (36.6 °C)  Heart Rate:  [68-85] 68  Resp:  [16-18] 18  BP: (117-147)/(41-53) 139/50  Flow (L/min):  [3] 3    Intake/Output Summary (Last 24 hours) at 2024 1707  Last data filed at 2024 1242  Gross per 24 hour   Intake 700 ml   Output 150 ml   Net 550 ml       Physical Exam:    General Appearance:frail pleasant WF no distress on NC O2  Neck: RIJ TDC, no JVD  Lungs: CTA bilat  Heart: RRR, normal S1 and S2  Abdomen: soft, nontender, nondistended  Extremities: no edema, cyanosis or clubbing    Scheduled Meds:     arformoterol, 15 mcg, Nebulization, BID - RT  azithromycin, 250 mg, Oral, Q24H  budesonide, 0.5 mg, Nebulization, BID - RT  carvedilol, 25 mg, Oral, BID With Meals  cefepime, 2,000 mg, Intravenous, Q24H  epoetin mita/mita-epbx, 10,000 Units, Intravenous, Once per day on   [START ON 2024] ergocalciferol, 50,000 Units, Oral, Q14 Days  famotidine, 20 mg, Oral, Daily  hydrALAZINE, 25 mg, Oral, Q12H  insulin lispro, 2-9 Units, Subcutaneous, 4x Daily AC & at Bedtime  ipratropium-albuterol, 3 mL, Nebulization, Once  isosorbide dinitrate, 20 mg, Oral, TID - Nitrates  multivitamin with minerals, 1 tablet, Oral, Daily  rosuvastatin, 20 mg, Oral, Nightly  sevelamer, 800 mg, Oral, TID With Meals  sodium chloride, 10 mL, Intravenous, Q12H  tobramycin PF, 300 mg, Nebulization, Q12H - RT      IV Meds:   Pharmacy to Dose Cefepime,         Results Reviewed:   I have personally reviewed the results from the time of this admission to 2024 17:07 EDT     Results from last 7 days   Lab  Units 04/26/24  0553 04/25/24  0617 04/24/24  0603 04/22/24  0518 04/21/24  1350   SODIUM mmol/L 136 141 136   < > 138   POTASSIUM mmol/L 4.6 4.0 4.1   < > 4.2   CHLORIDE mmol/L 104 108* 100   < > 100   CO2 mmol/L 20.6* 24.8 22.8   < > 25.3   BUN mg/dL 33* 20 49*   < > 48*   CREATININE mg/dL 3.04* 2.33* 3.77*   < > 4.21*   CALCIUM mg/dL 7.9* 8.1* 7.9*   < > 7.9*   BILIRUBIN mg/dL  --   --   --   --  0.3   ALK PHOS U/L  --   --   --   --  164*   ALT (SGPT) U/L  --   --   --   --  10   AST (SGOT) U/L  --   --   --   --  12   GLUCOSE mg/dL 119* 89 105*   < > 148*    < > = values in this interval not displayed.     Estimated Creatinine Clearance: 12.3 mL/min (A) (by C-G formula based on SCr of 3.04 mg/dL (H)).  Results from last 7 days   Lab Units 04/25/24  0617 04/23/24  0552 04/22/24  0518   MAGNESIUM mg/dL  --   --  1.8   PHOSPHORUS mg/dL 2.8 3.4 4.3         Results from last 7 days   Lab Units 04/25/24  0617 04/23/24  0552 04/22/24  0518 04/21/24  1350   WBC 10*3/mm3 7.19 9.76 5.25 7.66   HEMOGLOBIN g/dL 8.2* 8.1* 8.3* 8.5*   PLATELETS 10*3/mm3 174 183 152 150           Assessment / Plan     ASSESSMENT:  - ESRD, dialysis 3 times a week MWF at [facility], continue current schedule.  Volume status is adequate.  Electrolytes are stable.  Using her right IJ TDC until left upper extremity AV fistula is cleared for use after recent declot.  Dialyzed yesterday  - COPD exacerbation, viral bronchitis (human metapneumovirus), pseudomonas PNA - improving, PULM following   - Anemia of chronic kidney disease, receiving long-acting CASSANDRA as outpatient.  Iron studies are adequate.  - Diastolic dysfunction with LVH and moderate aortic stenosis.  Nitro, carvedilol and hydralazine added.  Per cardiology.  - Left renal lesion with recent spontaneous right perinephric subcapsular hematoma, needs urology follow-up as outpatient.  - History of hypertension, BP control adequate   - Type 2 diabetes with complications.  - History  of lung cancer      PLAN:  Next HD MON; will recheck labs then       Dakota Morgan MD  04/27/24  17:07 EDT    [facility]  [phone]

## 2024-04-28 LAB
ANION GAP SERPL CALCULATED.3IONS-SCNC: 9.1 MMOL/L (ref 5–15)
BACTERIA SPEC AEROBE CULT: ABNORMAL
BACTERIA SPEC AEROBE CULT: ABNORMAL
BACTERIA SPEC RESP CULT: ABNORMAL
BASOPHILS # BLD AUTO: 0.02 10*3/MM3 (ref 0–0.2)
BASOPHILS NFR BLD AUTO: 0.3 % (ref 0–1.5)
BUN SERPL-MCNC: 34 MG/DL (ref 8–23)
BUN/CREAT SERPL: 10.4 (ref 7–25)
CALCIUM SPEC-SCNC: 8.3 MG/DL (ref 8.6–10.5)
CHLORIDE SERPL-SCNC: 105 MMOL/L (ref 98–107)
CO2 SERPL-SCNC: 24.9 MMOL/L (ref 22–29)
CREAT SERPL-MCNC: 3.26 MG/DL (ref 0.57–1)
DEPRECATED RDW RBC AUTO: 57.5 FL (ref 37–54)
EGFRCR SERPLBLD CKD-EPI 2021: 13.2 ML/MIN/1.73
EOSINOPHIL # BLD AUTO: 0.2 10*3/MM3 (ref 0–0.4)
EOSINOPHIL NFR BLD AUTO: 3.2 % (ref 0.3–6.2)
ERYTHROCYTE [DISTWIDTH] IN BLOOD BY AUTOMATED COUNT: 16 % (ref 12.3–15.4)
GLUCOSE BLDC GLUCOMTR-MCNC: 115 MG/DL (ref 70–99)
GLUCOSE BLDC GLUCOMTR-MCNC: 144 MG/DL (ref 70–99)
GLUCOSE BLDC GLUCOMTR-MCNC: 153 MG/DL (ref 70–99)
GLUCOSE BLDC GLUCOMTR-MCNC: 77 MG/DL (ref 70–99)
GLUCOSE SERPL-MCNC: 83 MG/DL (ref 65–99)
GRAM STN SPEC: ABNORMAL
HCT VFR BLD AUTO: 28.4 % (ref 34–46.6)
HGB BLD-MCNC: 8.2 G/DL (ref 12–15.9)
IMM GRANULOCYTES # BLD AUTO: 0.09 10*3/MM3 (ref 0–0.05)
IMM GRANULOCYTES NFR BLD AUTO: 1.4 % (ref 0–0.5)
LYMPHOCYTES # BLD AUTO: 0.66 10*3/MM3 (ref 0.7–3.1)
LYMPHOCYTES NFR BLD AUTO: 10.4 % (ref 19.6–45.3)
MCH RBC QN AUTO: 29.5 PG (ref 26.6–33)
MCHC RBC AUTO-ENTMCNC: 28.9 G/DL (ref 31.5–35.7)
MCV RBC AUTO: 102.2 FL (ref 79–97)
MONOCYTES # BLD AUTO: 0.8 10*3/MM3 (ref 0.1–0.9)
MONOCYTES NFR BLD AUTO: 12.6 % (ref 5–12)
NEUTROPHILS NFR BLD AUTO: 4.57 10*3/MM3 (ref 1.7–7)
NEUTROPHILS NFR BLD AUTO: 72.1 % (ref 42.7–76)
NRBC BLD AUTO-RTO: 0 /100 WBC (ref 0–0.2)
PLATELET # BLD AUTO: 153 10*3/MM3 (ref 140–450)
PMV BLD AUTO: 9.6 FL (ref 6–12)
POTASSIUM SERPL-SCNC: 4.1 MMOL/L (ref 3.5–5.2)
RBC # BLD AUTO: 2.78 10*6/MM3 (ref 3.77–5.28)
SODIUM SERPL-SCNC: 139 MMOL/L (ref 136–145)
WBC NRBC COR # BLD AUTO: 6.34 10*3/MM3 (ref 3.4–10.8)

## 2024-04-28 PROCEDURE — 94799 UNLISTED PULMONARY SVC/PX: CPT

## 2024-04-28 PROCEDURE — 82948 REAGENT STRIP/BLOOD GLUCOSE: CPT | Performed by: INTERNAL MEDICINE

## 2024-04-28 PROCEDURE — 25010000002 CEFEPIME PER 500 MG: Performed by: INTERNAL MEDICINE

## 2024-04-28 PROCEDURE — 82948 REAGENT STRIP/BLOOD GLUCOSE: CPT

## 2024-04-28 PROCEDURE — 94664 DEMO&/EVAL PT USE INHALER: CPT

## 2024-04-28 PROCEDURE — 85025 COMPLETE CBC W/AUTO DIFF WBC: CPT | Performed by: INTERNAL MEDICINE

## 2024-04-28 PROCEDURE — 80048 BASIC METABOLIC PNL TOTAL CA: CPT | Performed by: INTERNAL MEDICINE

## 2024-04-28 PROCEDURE — 63710000001 INSULIN LISPRO (HUMAN) PER 5 UNITS: Performed by: INTERNAL MEDICINE

## 2024-04-28 PROCEDURE — 99233 SBSQ HOSP IP/OBS HIGH 50: CPT | Performed by: INTERNAL MEDICINE

## 2024-04-28 RX ORDER — ALBUMIN (HUMAN) 12.5 G/50ML
12.5 SOLUTION INTRAVENOUS AS NEEDED
Status: CANCELLED | OUTPATIENT
Start: 2024-04-29 | End: 2024-04-29

## 2024-04-28 RX ADMIN — CARVEDILOL 25 MG: 25 TABLET, FILM COATED ORAL at 13:05

## 2024-04-28 RX ADMIN — IPRATROPIUM BROMIDE AND ALBUTEROL SULFATE 3 ML: .5; 3 SOLUTION RESPIRATORY (INHALATION) at 23:04

## 2024-04-28 RX ADMIN — BUDESONIDE 0.5 MG: 0.5 SUSPENSION RESPIRATORY (INHALATION) at 07:27

## 2024-04-28 RX ADMIN — ROSUVASTATIN CALCIUM 20 MG: 20 TABLET, FILM COATED ORAL at 20:39

## 2024-04-28 RX ADMIN — Medication 1 TABLET: at 08:04

## 2024-04-28 RX ADMIN — AZITHROMYCIN DIHYDRATE 250 MG: 250 TABLET ORAL at 08:04

## 2024-04-28 RX ADMIN — ISOSORBIDE DINITRATE 20 MG: 20 TABLET ORAL at 13:05

## 2024-04-28 RX ADMIN — Medication 10 ML: at 08:04

## 2024-04-28 RX ADMIN — HYDRALAZINE HYDROCHLORIDE 25 MG: 50 TABLET ORAL at 23:09

## 2024-04-28 RX ADMIN — TOBRAMYCIN 300 MG: 300 SOLUTION ORAL at 07:27

## 2024-04-28 RX ADMIN — TOBRAMYCIN 300 MG: 300 SOLUTION ORAL at 18:46

## 2024-04-28 RX ADMIN — SEVELAMER CARBONATE 800 MG: 800 TABLET, FILM COATED ORAL at 08:04

## 2024-04-28 RX ADMIN — Medication 10 ML: at 20:51

## 2024-04-28 RX ADMIN — CEFEPIME 2000 MG: 2 INJECTION, POWDER, FOR SOLUTION INTRAVENOUS at 17:19

## 2024-04-28 RX ADMIN — ISOSORBIDE DINITRATE 20 MG: 20 TABLET ORAL at 08:04

## 2024-04-28 RX ADMIN — INSULIN LISPRO 2 UNITS: 100 INJECTION, SOLUTION INTRAVENOUS; SUBCUTANEOUS at 20:51

## 2024-04-28 RX ADMIN — ISOSORBIDE DINITRATE 20 MG: 20 TABLET ORAL at 17:19

## 2024-04-28 RX ADMIN — ARFORMOTEROL TARTRATE 15 MCG: 15 SOLUTION RESPIRATORY (INHALATION) at 07:27

## 2024-04-28 RX ADMIN — SEVELAMER CARBONATE 800 MG: 800 TABLET, FILM COATED ORAL at 13:05

## 2024-04-28 RX ADMIN — FAMOTIDINE 20 MG: 20 TABLET ORAL at 08:04

## 2024-04-28 RX ADMIN — CARVEDILOL 25 MG: 25 TABLET, FILM COATED ORAL at 17:19

## 2024-04-28 RX ADMIN — ARFORMOTEROL TARTRATE 15 MCG: 15 SOLUTION RESPIRATORY (INHALATION) at 18:46

## 2024-04-28 RX ADMIN — SEVELAMER CARBONATE 800 MG: 800 TABLET, FILM COATED ORAL at 17:19

## 2024-04-28 RX ADMIN — BUDESONIDE 0.5 MG: 0.5 SUSPENSION RESPIRATORY (INHALATION) at 18:46

## 2024-04-28 NOTE — PROGRESS NOTES
[facility] Progress Note      Patient Name: Charlette Rai  : 1935  MRN: [id number]  Primary Care Physician:  Rafael Lyons MD  Date of admission: 2024    Subjective     Interval History:   F/u ESRD    Review of Systems:   Mild dyspnea today    Objective     Vitals:   Temp:  [97.3 °F (36.3 °C)-98.1 °F (36.7 °C)] 97.5 °F (36.4 °C)  Heart Rate:  [68-94] 75  Resp:  [16-18] 18  BP: (135-164)/(42-50) 148/42  Flow (L/min):  [3] 3    Intake/Output Summary (Last 24 hours) at 2024 1651  Last data filed at 2024 1443  Gross per 24 hour   Intake 920 ml   Output 950 ml   Net -30 ml       Physical Exam:    General Appearance:frail pleasant WF no distress on NC O2  Neck: RIJ TDC, no JVD  Lungs: CTA bilat  Heart: RRR, normal S1 and S2  Abdomen: soft, nontender, nondistended  Extremities: no edema, cyanosis or clubbing    Scheduled Meds:     arformoterol, 15 mcg, Nebulization, BID - RT  azithromycin, 250 mg, Oral, Q24H  budesonide, 0.5 mg, Nebulization, BID - RT  carvedilol, 25 mg, Oral, BID With Meals  cefepime, 2,000 mg, Intravenous, Q24H  epoetin mita/mita-epbx, 10,000 Units, Intravenous, Once per day on   [START ON 2024] ergocalciferol, 50,000 Units, Oral, Q14 Days  famotidine, 20 mg, Oral, Daily  hydrALAZINE, 25 mg, Oral, Q12H  insulin lispro, 2-9 Units, Subcutaneous, 4x Daily AC & at Bedtime  isosorbide dinitrate, 20 mg, Oral, TID - Nitrates  multivitamin with minerals, 1 tablet, Oral, Daily  rosuvastatin, 20 mg, Oral, Nightly  sevelamer, 800 mg, Oral, TID With Meals  sodium chloride, 10 mL, Intravenous, Q12H  tobramycin PF, 300 mg, Nebulization, Q12H - RT      IV Meds:   Pharmacy to Dose Cefepime,         Results Reviewed:   I have personally reviewed the results from the time of this admission to 2024 16:51 EDT     Results from last 7 days   Lab Units 24  0605 24  0553 24  0617   SODIUM mmol/L 139 136 141   POTASSIUM  mmol/L 4.1 4.6 4.0   CHLORIDE mmol/L 105 104 108*   CO2 mmol/L 24.9 20.6* 24.8   BUN mg/dL 34* 33* 20   CREATININE mg/dL 3.26* 3.04* 2.33*   CALCIUM mg/dL 8.3* 7.9* 8.1*   GLUCOSE mg/dL 83 119* 89     Estimated Creatinine Clearance: 11.5 mL/min (A) (by C-G formula based on SCr of 3.26 mg/dL (H)).  Results from last 7 days   Lab Units 04/25/24  0617 04/23/24  0552 04/22/24  0518   MAGNESIUM mg/dL  --   --  1.8   PHOSPHORUS mg/dL 2.8 3.4 4.3         Results from last 7 days   Lab Units 04/28/24  0605 04/25/24  0617 04/23/24  0552 04/22/24  0518   WBC 10*3/mm3 6.34 7.19 9.76 5.25   HEMOGLOBIN g/dL 8.2* 8.2* 8.1* 8.3*   PLATELETS 10*3/mm3 153 174 183 152           Assessment / Plan     ASSESSMENT:  - ESRD, dialysis 3 times a week MWF at [facility], continue current schedule.  Volume status is adequate.  Electrolytes are stable.  Using her right IJ TDC until left upper extremity AV fistula is cleared for use after recent declot.  Dialyzed [facility]RI  - COPD exacerbation, viral bronchitis (human metapneumovirus), pseudomonas PNA - improving, PULM following   - Anemia of chronic kidney disease, receiving long-acting CASSANDRA as outpatient.  Iron studies are adequate.  - Diastolic dysfunction with LVH and moderate aortic stenosis.  Nitro, carvedilol and hydralazine added.  Per cardiology.  - Left renal lesion with recent spontaneous right perinephric subcapsular hematoma, needs urology follow-up as outpatient.  - History of hypertension, BP control adequate   - Type 2 diabetes with complications.  - History of lung cancer      PLAN:  HD tomorrow, gentle UF  Reiterates to me she is unwilling to go to rehab      Dakota Morgan MD  04/28/24  16:51 EDT    [facility]  [phone]

## 2024-04-28 NOTE — PLAN OF CARE
Goal Outcome Evaluation:  Plan of Care Reviewed With: patient        Progress: no change  Outcome Evaluation: Vss. Tylenol for back pain. Difficulty sleeping. No acute changes. Will continue plan of care.

## 2024-04-28 NOTE — PROGRESS NOTES
Pulmonary / Critical Care Progress Note      Patient Name: Charlette Rai  : 1935  MRN: [id number]  Primary Care Physician:  Rafael Lyons MD  Date of admission: 2024    Subjective   Subjective   Follow-up for persistent bronchitis, respiratory failure, failure with airway clearance, ESRD on dialysis.    Slowly improving  Currently on 3 L of oxygen  Shortness of breath and cough improving.  Cough reductive of thin and clear secretions  No wheezing  Tolerating dialysis  No nausea or vomiting.  No fever or chills.        Objective   Objective     Vitals:   Temp:  [97.3 °F (36.3 °C)-98.1 °F (36.7 °C)] 97.3 °F (36.3 °C)  Heart Rate:  [68-94] 94  Resp:  [16-18] 18  BP: (135-164)/(46-50) 150/48  Flow (L/min):  [3] 3    Physical Exam   Vital Signs Reviewed   General:  WDWN, Alert, no distress  HEENT:  PERRL, EOMI.  OP, nares clear  Chest: Poor aeration, unchanged minimal scattered rhonchi, no wheezing, bibasilar crackles, resonant to percussion bilaterally, TDC in place  CV: RRR, no MGR, pulses 2+, equal.  Abd:  Soft, NT, ND, + BS, no HSM  EXT:  no clubbing, no cyanosis, no edema  Neuro:  A&Ox3, CN grossly intact, no focal deficits.  Skin: No rashes or lesions noted      Result Review    Result Review:  I have personally reviewed the results from the time of this admission to 2024 12:24 EDT and agree with these findings:  [x]  Laboratory  [x]  Microbiology  [x]  Radiology  [x]  EKG/Telemetry   [x]  Cardiology/Vascular   []  Pathology  []  Old records  []  Other:  Most notable findings include:         Lab 24  0605 24  0553 24  0617 24  0603 24  0552 24  0518 24  1350   WBC 6.34  --  7.19  --  9.76 5.25 7.66   HEMOGLOBIN 8.2*  --  8.2*  --  8.1* 8.3* 8.5*   HEMATOCRIT 28.4*  --  28.3*  --  26.8* 27.3* 28.2*   PLATELETS 153  --  174  --  183 152 150   SODIUM 139 136 141 136 136 139 138   POTASSIUM 4.1 4.6 4.0 4.1 4.2 4.2 4.2   CHLORIDE 105 104 108* 100 101 102 100    CO2 24.9 20.6* 24.8 22.8 23.3 22.1 25.3   BUN 34* 33* 20 49* 33* 55* 48*   CREATININE 3.26* 3.04* 2.33* 3.77* 3.11* 4.05* 4.21*   GLUCOSE 83 119* 89 105* 110* 239* 148*   CALCIUM 8.3* 7.9* 8.1* 7.9* 8.3* 7.9* 7.9*   PHOSPHORUS  --   --  2.8  --  3.4 4.3  --    TOTAL PROTEIN  --   --   --   --   --   --  5.8*   ALBUMIN  --   --  2.9*  --  3.3*  --  3.3*   GLOBULIN  --   --   --   --   --   --  2.5      Sputum culture with pansensitive Pseudomonas      Assessment & Plan   Assessment / Plan     Active Hospital Problems:  Active Hospital Problems    Diagnosis     ESRD (end stage renal disease)     COPD exacerbation     COPD with acute exacerbation     ESRD (end stage renal disease)     Aortic stenosis, moderate     Anemia of chronic disease     Essential hypertension     Acute on chronic heart failure with preserved ejection fraction (HFpEF)     Chronic obstructive pulmonary disease with acute exacerbation          Impression:   Diastolic heart failure with acute exacerbation  COPD with acute exacerbation  Pseudomonas pneumonia  End-stage renal disease on dialysis  History of smoking in past  Persistent cough  Viral bronchitis with human metapneumovirus  Therapeutic drug monitoring of antibiotics    Plan:   Respiratory viral panel was positive for human metapneumovirus.  Bronchoscopy also positive for Pseudomonas  Continue with Brovana and Pulmicort nebulizers twice daily.  Was on Symbicort at home.  Will likely need scheduled DuoNeb 4 times daily for home.  May need to consider discharging on Brovana and Pulmicort as well at home and status Symbicort  Continue DuoNeb nebulizer 4 times a day  Continue chest vest, likely secretions obstructing right bronchus intermedius.  Continue dialysis per nephrology service.  Continue 14 days of antibiotics for Pseudomonas pneumonia.  Currently on cefepime and can discharge on Levaquin to complete therapy.  Continue JUAN nebs.  RT case management consult.  Appreciate them  assisting with arranging for home nebulizers and chest vest  Cardiac meds including Coreg, hydralazine per cardiology  Continue chronic daily azithromycin 250 mg.  Agree with primary that patient would benefit from rehab    Ms Rai  has a history of bronchiectasis visualized by CT 04/22/2024 and daily productive cough for more than six months. Ms Rai has had ongoing issues with airway clearance and mucous plugging despite use of Aerobika OPEP.  Other treatments  have failed to mobilize secretions leading to recurrent hospitalizations (at least four since October 2023) and bronchoscopy. She will need to have HFCWO at home to loosen and help mobilize her secretions.     DVT prophylaxis:  Medical and mechanical DVT prophylaxis orders are present.    CODE STATUS:   Code Status (Patient has no pulse and is not breathing): CPR (Attempt to Resuscitate)  Medical Interventions (Patient has pulse or is breathing): Full Support      Labs, imaging, microbiology, notes and medications personally reviewed  Discussed with primary    Electronically signed by Clement Motta MD, 4/28/2024, 12:24 EDT.

## 2024-04-28 NOTE — PROGRESS NOTES
[facility]     Progress Note    Patient Name: Charlette Rai  : 1935  MRN: [id number]  Primary Care Physician:  Rafael Lyons MD  Date of admission: 2024      Subjective   Brief summary.  Patient admitted with increasing shortness of breath, status post bronchoscopy      HPI:  Patient feeling better.  Cultures growing Pseudomonas on antibiotics.  No chest pain.  Blood pressure diastolic low Coreg held by the nursing Along with hydralazine per guidelines from pharmacy.    Review of Systems     No chest pain, no shortness of breath.  Cough, congestion, no fever occasional palpitations.      Objective     Vitals:   Temp:  [97.3 °F (36.3 °C)-98.1 °F (36.7 °C)] 97.3 °F (36.3 °C)  Heart Rate:  [68-94] 94  Resp:  [16-18] 18  BP: (135-164)/(46-50) 150/48  Flow (L/min):  [3] 3    Physical Exam :     Elderly female not in respiratory distress.  Heart regular.  Lungs coarse breath..  Abdomen soft.  Nontender.  Neurologically awake alert and oriented.  Extremities no edema.     Result Review:  I have personally reviewed the results from the time of this admission to 2024 11:43 EDT and agree with these findings:  [x]  Laboratory  []  Microbiology  [x]  Radiology  []  EKG/Telemetry   []  Cardiology/Vascular   []  Pathology  []  Old records  []  Other:       Reviewed    Assessment / Plan       Active Hospital Problems:  Active Hospital Problems    Diagnosis     ESRD (end stage renal disease)     COPD exacerbation     COPD with acute exacerbation     ESRD (end stage renal disease)     Aortic stenosis, moderate     Anemia of chronic disease     Essential hypertension     Acute on chronic heart failure with preserved ejection fraction (HFpEF)     Chronic obstructive pulmonary disease with acute exacerbation        Plan:   Continue antibiotic per pulmonary.  Treating for Pseudomonas pneumonia.  Restart Coreg.  Discussed with patient about inpatient rehab but again refused.  Increase activity with assistance and  PT OT.  Possible discharge in 1 to 2 days.    DVT prophylaxis:  Medical and mechanical DVT prophylaxis orders are present.        CODE STATUS:   Code Status (Patient has no pulse and is not breathing): CPR (Attempt to Resuscitate)  Medical Interventions (Patient has pulse or is breathing): Full Support            Electronically signed by Rafael Lyons MD, 04/28/24, 11:45 AM EDT.

## 2024-04-28 NOTE — PLAN OF CARE
Goal Outcome Evaluation:  Plan of Care Reviewed With: patient        Progress: no change  Outcome Evaluation: Pt vss. Pt had c/o back pain did not want anything. Continue plan of care

## 2024-04-29 ENCOUNTER — READMISSION MANAGEMENT (OUTPATIENT)
Dept: CALL CENTER | Facility: HOSPITAL | Age: 89
End: 2024-04-29
Payer: MEDICARE

## 2024-04-29 VITALS
OXYGEN SATURATION: 100 % | RESPIRATION RATE: 18 BRPM | WEIGHT: 135.8 LBS | TEMPERATURE: 97.8 F | BODY MASS INDEX: 22.63 KG/M2 | HEIGHT: 65 IN | SYSTOLIC BLOOD PRESSURE: 175 MMHG | DIASTOLIC BLOOD PRESSURE: 42 MMHG | HEART RATE: 81 BPM

## 2024-04-29 PROBLEM — J44.1 COPD WITH ACUTE EXACERBATION: Status: RESOLVED | Noted: 2024-04-21 | Resolved: 2024-04-29

## 2024-04-29 PROBLEM — I50.33 ACUTE ON CHRONIC HEART FAILURE WITH PRESERVED EJECTION FRACTION (HFPEF): Status: RESOLVED | Noted: 2021-11-03 | Resolved: 2024-04-29

## 2024-04-29 PROBLEM — J44.1 COPD EXACERBATION: Status: RESOLVED | Noted: 2024-04-21 | Resolved: 2024-04-29

## 2024-04-29 PROBLEM — J44.1 CHRONIC OBSTRUCTIVE PULMONARY DISEASE WITH ACUTE EXACERBATION: Status: RESOLVED | Noted: 2021-10-29 | Resolved: 2024-04-29

## 2024-04-29 PROBLEM — J15.1 PSEUDOMONAS PNEUMONIA: Status: ACTIVE | Noted: 2024-04-29

## 2024-04-29 LAB
ANION GAP SERPL CALCULATED.3IONS-SCNC: 10 MMOL/L (ref 5–15)
BASOPHILS # BLD AUTO: 0.02 10*3/MM3 (ref 0–0.2)
BASOPHILS NFR BLD AUTO: 0.3 % (ref 0–1.5)
BUN SERPL-MCNC: 41 MG/DL (ref 8–23)
BUN/CREAT SERPL: 11.7 (ref 7–25)
CALCIUM SPEC-SCNC: 8.1 MG/DL (ref 8.6–10.5)
CHLORIDE SERPL-SCNC: 106 MMOL/L (ref 98–107)
CO2 SERPL-SCNC: 21 MMOL/L (ref 22–29)
CREAT SERPL-MCNC: 3.51 MG/DL (ref 0.57–1)
DEPRECATED RDW RBC AUTO: 58.4 FL (ref 37–54)
EGFRCR SERPLBLD CKD-EPI 2021: 12.1 ML/MIN/1.73
EOSINOPHIL # BLD AUTO: 0.21 10*3/MM3 (ref 0–0.4)
EOSINOPHIL NFR BLD AUTO: 3.1 % (ref 0.3–6.2)
ERYTHROCYTE [DISTWIDTH] IN BLOOD BY AUTOMATED COUNT: 16.4 % (ref 12.3–15.4)
GLUCOSE BLDC GLUCOMTR-MCNC: 77 MG/DL (ref 70–99)
GLUCOSE SERPL-MCNC: 81 MG/DL (ref 65–99)
HCT VFR BLD AUTO: 28 % (ref 34–46.6)
HGB BLD-MCNC: 8.4 G/DL (ref 12–15.9)
IMM GRANULOCYTES # BLD AUTO: 0.08 10*3/MM3 (ref 0–0.05)
IMM GRANULOCYTES NFR BLD AUTO: 1.2 % (ref 0–0.5)
LYMPHOCYTES # BLD AUTO: 0.75 10*3/MM3 (ref 0.7–3.1)
LYMPHOCYTES NFR BLD AUTO: 11 % (ref 19.6–45.3)
MCH RBC QN AUTO: 30.5 PG (ref 26.6–33)
MCHC RBC AUTO-ENTMCNC: 30 G/DL (ref 31.5–35.7)
MCV RBC AUTO: 101.8 FL (ref 79–97)
MONOCYTES # BLD AUTO: 0.93 10*3/MM3 (ref 0.1–0.9)
MONOCYTES NFR BLD AUTO: 13.7 % (ref 5–12)
NEUTROPHILS NFR BLD AUTO: 4.81 10*3/MM3 (ref 1.7–7)
NEUTROPHILS NFR BLD AUTO: 70.7 % (ref 42.7–76)
NRBC BLD AUTO-RTO: 0 /100 WBC (ref 0–0.2)
PLATELET # BLD AUTO: 132 10*3/MM3 (ref 140–450)
PMV BLD AUTO: 9.7 FL (ref 6–12)
POTASSIUM SERPL-SCNC: 3.9 MMOL/L (ref 3.5–5.2)
RBC # BLD AUTO: 2.75 10*6/MM3 (ref 3.77–5.28)
SODIUM SERPL-SCNC: 137 MMOL/L (ref 136–145)
WBC NRBC COR # BLD AUTO: 6.8 10*3/MM3 (ref 3.4–10.8)

## 2024-04-29 PROCEDURE — 94799 UNLISTED PULMONARY SVC/PX: CPT

## 2024-04-29 PROCEDURE — 94664 DEMO&/EVAL PT USE INHALER: CPT

## 2024-04-29 PROCEDURE — 25010000002 HEPARIN (PORCINE) PER 1000 UNITS: Performed by: INTERNAL MEDICINE

## 2024-04-29 PROCEDURE — 99221 1ST HOSP IP/OBS SF/LOW 40: CPT | Performed by: SURGERY

## 2024-04-29 PROCEDURE — 25010000002 EPOETIN ALFA PER 1000 UNITS: Performed by: INTERNAL MEDICINE

## 2024-04-29 PROCEDURE — 85025 COMPLETE CBC W/AUTO DIFF WBC: CPT | Performed by: INTERNAL MEDICINE

## 2024-04-29 PROCEDURE — 82948 REAGENT STRIP/BLOOD GLUCOSE: CPT

## 2024-04-29 PROCEDURE — 99233 SBSQ HOSP IP/OBS HIGH 50: CPT | Performed by: INTERNAL MEDICINE

## 2024-04-29 PROCEDURE — 80048 BASIC METABOLIC PNL TOTAL CA: CPT | Performed by: INTERNAL MEDICINE

## 2024-04-29 RX ORDER — LOSARTAN POTASSIUM 25 MG/1
50 TABLET ORAL
Qty: 60 TABLET | Refills: 0 | Status: SHIPPED | OUTPATIENT
Start: 2024-04-30 | End: 2024-05-01 | Stop reason: SDUPTHER

## 2024-04-29 RX ORDER — LOSARTAN POTASSIUM 25 MG/1
25 TABLET ORAL
Status: DISCONTINUED | OUTPATIENT
Start: 2024-04-29 | End: 2024-04-29 | Stop reason: HOSPADM

## 2024-04-29 RX ORDER — ALBUTEROL SULFATE 2.5 MG/3ML
2.5 SOLUTION RESPIRATORY (INHALATION)
Status: DISCONTINUED | OUTPATIENT
Start: 2024-04-29 | End: 2024-04-29 | Stop reason: HOSPADM

## 2024-04-29 RX ORDER — ALBUTEROL SULFATE 2.5 MG/3ML
2.5 SOLUTION RESPIRATORY (INHALATION)
Status: DISCONTINUED | OUTPATIENT
Start: 2024-04-29 | End: 2024-04-29

## 2024-04-29 RX ORDER — IPRATROPIUM BROMIDE AND ALBUTEROL SULFATE 2.5; .5 MG/3ML; MG/3ML
3 SOLUTION RESPIRATORY (INHALATION) EVERY 4 HOURS PRN
Qty: 360 ML | Refills: 2 | Status: SHIPPED | OUTPATIENT
Start: 2024-04-29 | End: 2024-05-29

## 2024-04-29 RX ORDER — ISOSORBIDE DINITRATE 20 MG/1
20 TABLET ORAL
Qty: 90 TABLET | Refills: 0 | Status: SHIPPED | OUTPATIENT
Start: 2024-04-29 | End: 2024-05-29

## 2024-04-29 RX ORDER — LEVOFLOXACIN 250 MG/1
250 TABLET, FILM COATED ORAL DAILY
Qty: 7 TABLET | Refills: 0 | Status: SHIPPED | OUTPATIENT
Start: 2024-04-29 | End: 2024-05-07 | Stop reason: HOSPADM

## 2024-04-29 RX ORDER — PREDNISONE 10 MG/1
10 TABLET ORAL DAILY
Qty: 5 TABLET | Refills: 0 | Status: SHIPPED | OUTPATIENT
Start: 2024-04-29 | End: 2024-05-07 | Stop reason: HOSPADM

## 2024-04-29 RX ADMIN — ALBUTEROL SULFATE 2.5 MG: 2.5 SOLUTION RESPIRATORY (INHALATION) at 04:38

## 2024-04-29 RX ADMIN — ALBUTEROL SULFATE 2.5 MG: 2.5 SOLUTION RESPIRATORY (INHALATION) at 06:58

## 2024-04-29 RX ADMIN — LOSARTAN POTASSIUM 25 MG: 25 TABLET, FILM COATED ORAL at 11:48

## 2024-04-29 RX ADMIN — ARFORMOTEROL TARTRATE 15 MCG: 15 SOLUTION RESPIRATORY (INHALATION) at 06:52

## 2024-04-29 RX ADMIN — ERYTHROPOIETIN 10000 UNITS: 10000 INJECTION, SOLUTION INTRAVENOUS; SUBCUTANEOUS at 14:27

## 2024-04-29 RX ADMIN — AZITHROMYCIN DIHYDRATE 250 MG: 250 TABLET ORAL at 11:48

## 2024-04-29 RX ADMIN — HYDRALAZINE HYDROCHLORIDE 25 MG: 50 TABLET ORAL at 11:48

## 2024-04-29 RX ADMIN — SEVELAMER CARBONATE 800 MG: 800 TABLET, FILM COATED ORAL at 12:11

## 2024-04-29 RX ADMIN — ALBUTEROL SULFATE 2.5 MG: 2.5 SOLUTION RESPIRATORY (INHALATION) at 02:25

## 2024-04-29 RX ADMIN — ISOSORBIDE DINITRATE 20 MG: 20 TABLET ORAL at 12:11

## 2024-04-29 RX ADMIN — HEPARIN SODIUM 3400 UNITS: 1000 INJECTION INTRAVENOUS; SUBCUTANEOUS at 11:17

## 2024-04-29 RX ADMIN — ERGOCALCIFEROL 50000 UNITS: 1.25 CAPSULE ORAL at 12:11

## 2024-04-29 RX ADMIN — FAMOTIDINE 20 MG: 20 TABLET ORAL at 11:48

## 2024-04-29 RX ADMIN — Medication 10 ML: at 11:49

## 2024-04-29 RX ADMIN — TOBRAMYCIN 300 MG: 300 SOLUTION ORAL at 06:54

## 2024-04-29 RX ADMIN — CARVEDILOL 25 MG: 25 TABLET, FILM COATED ORAL at 11:48

## 2024-04-29 RX ADMIN — Medication 1 TABLET: at 11:48

## 2024-04-29 RX ADMIN — BUDESONIDE 0.5 MG: 0.5 SUSPENSION RESPIRATORY (INHALATION) at 06:52

## 2024-04-29 NOTE — PROGRESS NOTES
Pulmonary / Critical Care Progress Note      Patient Name: Charlette Rai  : 1935  MRN: [id number]  Primary Care Physician:  Rafael Lyons MD  Date of admission: 2024    Subjective   Subjective   Follow-up for persistent bronchitis, respiratory failure, failure with airway clearance, ESRD on dialysis.    Over last 24 hours, remained on nebulizers including Brovana, Pulmicort.  Continued with IV cefepime and tobramycin nebulizer.    No acute events overnight.    This morning,  Shortness of breath is about the same.  Has no chest pain.  No wheezing.  Tolerating dialysis  No nausea or vomiting.  No fever or chills.        Objective   Objective     Vitals:   Temp:  [97.3 °F (36.3 °C)-98.1 °F (36.7 °C)] 97.9 °F (36.6 °C)  Heart Rate:  [] 70  Resp:  [16-20] 18  BP: (138-179)/(41-50) 154/49  Flow (L/min):  [3] 3    Physical Exam   Vital Signs Reviewed   General:  WDWN, Alert, no distress  HEENT:  PERRL, EOMI.  OP, nares clear  Chest: Poor aeration, unchanged minimal scattered rhonchi, no wheezing, bibasilar crackles, resonant to percussion bilaterally, TDC in place  CV: RRR, no MGR, pulses 2+, equal.  Abd:  Soft, NT, ND, + BS, no HSM  EXT:  no clubbing, no cyanosis, no edema  Neuro:  A&Ox3, CN grossly intact, no focal deficits.  Skin: No rashes or lesions noted      Result Review    Result Review:  I have personally reviewed the results from the time of this admission to 2024 07:49 EDT and agree with these findings:  [x]  Laboratory  [x]  Microbiology  [x]  Radiology  [x]  EKG/Telemetry   [x]  Cardiology/Vascular   []  Pathology  []  Old records  []  Other:  Most notable findings include:         Lab 24  0533 24  0605 24  0553 24  0617 24  0603 24  0552   WBC 6.80 6.34  --  7.19  --  9.76   HEMOGLOBIN 8.4* 8.2*  --  8.2*  --  8.1*   HEMATOCRIT 28.0* 28.4*  --  28.3*  --  26.8*   PLATELETS 132* 153  --  174  --  183   SODIUM 137 139 136 141 136 136   POTASSIUM 3.9  4.1 4.6 4.0 4.1 4.2   CHLORIDE 106 105 104 108* 100 101   CO2 21.0* 24.9 20.6* 24.8 22.8 23.3   BUN 41* 34* 33* 20 49* 33*   CREATININE 3.51* 3.26* 3.04* 2.33* 3.77* 3.11*   GLUCOSE 81 83 119* 89 105* 110*   CALCIUM 8.1* 8.3* 7.9* 8.1* 7.9* 8.3*   PHOSPHORUS  --   --   --  2.8  --  3.4   ALBUMIN  --   --   --  2.9*  --  3.3*      Sputum culture with pansensitive Pseudomonas      Assessment & Plan   Assessment / Plan     Active Hospital Problems:  Active Hospital Problems    Diagnosis     ESRD (end stage renal disease)     COPD exacerbation     COPD with acute exacerbation     ESRD (end stage renal disease)     Aortic stenosis, moderate     Anemia of chronic disease     Essential hypertension     Acute on chronic heart failure with preserved ejection fraction (HFpEF)     Chronic obstructive pulmonary disease with acute exacerbation          Impression:   Diastolic heart failure with acute exacerbation  COPD with acute exacerbation  Pseudomonas pneumonia  End-stage renal disease on dialysis  History of smoking in past  Persistent cough  Viral bronchitis with human metapneumovirus  Therapeutic drug monitoring of antibiotics    Plan:   Respiratory viral panel was positive for human metapneumovirus.  Bronchoscopy also positive for Pseudomonas  Continue with Brovana and Pulmicort nebulizers twice daily.  Was on Symbicort at home.  Will likely need scheduled DuoNeb 4 times daily for home.  May need to consider discharging on Brovana and Pulmicort as well at home and status Symbicort  Continue DuoNeb nebulizer 4 times a day  Continue chest vest, likely secretions obstructing right bronchus intermedius.  Continue dialysis per nephrology service.  Continue 14 days of antibiotics for Pseudomonas pneumonia.  Currently on IV cefepime and can discharge on Levaquin to complete therapy.  Continue JUAN nebs.  RT case management consult.  Appreciate them assisting with arranging for home nebulizers and chest vest  Cardiac meds  including Coreg, hydralazine per cardiology  Continue chronic daily azithromycin 250 mg.  Agree with primary that patient would benefit from rehab    Ms Rai  has a history of bronchiectasis visualized by CT 04/22/2024 and daily productive cough for more than six months. Ms Rai has had ongoing issues with airway clearance and mucous plugging despite use of Aerobika OPEP.  Other treatments  have failed to mobilize secretions leading to recurrent hospitalizations (at least four since October 2023) and bronchoscopy. She will need to have HFCWO at home to loosen and help mobilize her secretions.     Continues to have tenuous respiratory status.  Improving slowly.  Having dialysis today.    DVT prophylaxis:  Medical and mechanical DVT prophylaxis orders are present.    CODE STATUS:   Code Status (Patient has no pulse and is not breathing): CPR (Attempt to Resuscitate)  Medical Interventions (Patient has pulse or is breathing): Full Support      Labs, imaging, microbiology, notes and medications personally reviewed  Discussed with primary    Electronically signed by Khalif Yanez MD, 4/29/2024, 07:49 EDT.

## 2024-04-29 NOTE — SIGNIFICANT NOTE
04/29/24 1341   OTHER   Discipline occupational therapist   Rehab Time/Intention   Session Not Performed   (dialysis today)

## 2024-04-29 NOTE — PROGRESS NOTES
[facility] Progress Note      Patient Name: Charlette Rai  : 1935  MRN: [id number]  Primary Care Physician:  Rafael Lyons MD  Date of admission: 2024    Subjective     Interval History:   F/u ESRD    Review of Systems:   On dialysis BP high 180s systolic  C/o tingling numbness left hand (AVG arm, unused) and was to see vasc surg for this while hospitalized     Objective     Vitals:   Temp:  [97.3 °F (36.3 °C)-98.1 °F (36.7 °C)] 97.9 °F (36.6 °C)  Heart Rate:  [] 70  Resp:  [16-20] 18  BP: (138-185)/(41-69) 174/62  Flow (L/min):  [3] 3    Intake/Output Summary (Last 24 hours) at 2024 1015  Last data filed at 2024 0448  Gross per 24 hour   Intake 820 ml   Output 525 ml   Net 295 ml       Physical Exam:    General Appearance:frail pleasant WF on dialysis and NC O2, uncomfortable  Neck: RIJ TDC, no JVD  Lungs: CTA bilat  Heart: RRR, normal S1 and S2  Abdomen: soft, nontender, nondistended  Extremities: no edema, cyanosis or clubbing    Scheduled Meds:     arformoterol, 15 mcg, Nebulization, BID - RT  azithromycin, 250 mg, Oral, Q24H  budesonide, 0.5 mg, Nebulization, BID - RT  carvedilol, 25 mg, Oral, BID With Meals  cefepime, 2,000 mg, Intravenous, Q24H  epoetin mita/mita-epbx, 10,000 Units, Intravenous, Once per day on   ergocalciferol, 50,000 Units, Oral, Q14 Days  famotidine, 20 mg, Oral, Daily  hydrALAZINE, 25 mg, Oral, Q12H  insulin lispro, 2-9 Units, Subcutaneous, 4x Daily AC & at Bedtime  isosorbide dinitrate, 20 mg, Oral, TID - Nitrates  multivitamin with minerals, 1 tablet, Oral, Daily  rosuvastatin, 20 mg, Oral, Nightly  sevelamer, 800 mg, Oral, TID With Meals  sodium chloride, 10 mL, Intravenous, Q12H  tobramycin PF, 300 mg, Nebulization, Q12H - RT      IV Meds:   Pharmacy to Dose Cefepime,         Results Reviewed:   I have personally reviewed the results from the time of this admission to 2024 10:15 EDT     Results  from last 7 days   Lab Units 04/29/24  0533 04/28/24  0605 04/26/24  0553   SODIUM mmol/L 137 139 136   POTASSIUM mmol/L 3.9 4.1 4.6   CHLORIDE mmol/L 106 105 104   CO2 mmol/L 21.0* 24.9 20.6*   BUN mg/dL 41* 34* 33*   CREATININE mg/dL 3.51* 3.26* 3.04*   CALCIUM mg/dL 8.1* 8.3* 7.9*   GLUCOSE mg/dL 81 83 119*     Estimated Creatinine Clearance: 11 mL/min (A) (by C-G formula based on SCr of 3.51 mg/dL (H)).  Results from last 7 days   Lab Units 04/25/24  0617 04/23/24  0552   PHOSPHORUS mg/dL 2.8 3.4         Results from last 7 days   Lab Units 04/29/24  0533 04/28/24  0605 04/25/24  0617 04/23/24  0552   WBC 10*3/mm3 6.80 6.34 7.19 9.76   HEMOGLOBIN g/dL 8.4* 8.2* 8.2* 8.1*   PLATELETS 10*3/mm3 132* 153 174 183           Assessment / Plan     ASSESSMENT:  - ESRD, dialysis 3 times a week MWF at [facility], continue current schedule.  Volume status is adequate.  Electrolytes are stable.  Using her right IJ TDC until left upper extremity AV fistula is cleared for use after recent declot (and now having steal syn type syn left hand).  Dialysis in progress  - COPD exacerbation, viral bronchitis (human metapneumovirus), pseudomonas PNA - improving, PULM following   - Anemia of chronic kidney disease, receiving long-acting CASSANDRA as outpatient.  Iron studies are adequate.  - Diastolic dysfunction with LVH and moderate aortic stenosis.  Nitro, carvedilol and hydralazine added.  Per cardiology.  - Left renal lesion with recent spontaneous right perinephric subcapsular hematoma, needs urology follow-up as outpatient.  - History of hypertension, BP uncontrolled; on max coreg, low dose hydralazine   - Type 2 diabetes with complications.  - History of lung cancer     PLAN:  HD today in progress  Start ARB (tolerated before)  Will d/w primary team vascular eval for steal syn sx  No labs needed tomorrow from my standpoint      Dakota Morgan MD  04/29/24  10:15 EDT    [facility]  [phone]

## 2024-04-29 NOTE — OUTREACH NOTE
Prep Survey      Flowsheet Row Responses   [facility]ondenominational facility patient discharged from? [facility]   Is LACE score < 7 ? No   Eligibility Readm Mgmt   Discharge diagnosis COPD wtih acute exacerbation, ESRD on HD   Does the patient have one of the following disease processes/diagnoses(primary or secondary)? COPD   Does the patient have Home health ordered? Yes   What is the Home health agency?  [facility]    Is there a DME ordered? Yes   What DME was ordered? ? nebulizer machine   Prep survey completed? Yes            Tammy BHAKTA - Registered Nurse

## 2024-04-29 NOTE — NURSING NOTE
Duration of Treatment 3.5 Hours                                    Completed entire tx   Access Site Tunneled Dialysis Catheter   Dialyzer Revaclear    mL/min   Dialysate Temperature (C) 36                                                  BFR-As tolerated to a maximum of: 400 mL/min   Dialysate Solution Bath: K+ 3 mEq, Ca 2.5mEq   Bicarb 35 mEq   Na+ 137 meq   Fluid Removal: 1.5                                               Removed 1.5L       Pt completed entire 3.5 hour dialysis treatment; removed 1.5L; pt tolerated well; post /63 HR 75.     Report given to BILL Bhat.

## 2024-04-29 NOTE — CASE MANAGEMENT/SOCIAL WORK
RT CM followed up with patient to educate on tobramycin nebulizer treatments and chest vest.     Patient will need Tobramycin order sent to [facility] in order to obtain financial assistance.  [employer] will contact patient to arrange set up of device.    Ms. Rai was being discharged when I arrived.  She did not have a portable oxygen tank with her.  Patient stated she is no longer able to use her portable oxygen because it will not come out when she takes a breath.  It appears patient can no longer trigger a pulse dose device.  [facility] was contacted to check on patient and change to continuous flow.     Pulmonary notified that patient will need Tobramycin order sent to [facility].

## 2024-04-29 NOTE — PLAN OF CARE
Goal Outcome Evaluation:  Plan of Care Reviewed With: patient           Outcome Evaluation: pt with episodes of increased soa and wheezing tonight. prn breathing tx increased to q2h. pt diastolic bp continues to remain low, but asymptomatic and trending for pt.

## 2024-04-29 NOTE — PLAN OF CARE
Goal Outcome Evaluation:   VSS. Patient A/Ox4. Patient is discharging home with family this afternoon. No acute changes at this time.

## 2024-04-29 NOTE — DISCHARGE SUMMARY
[facility]         DISCHARGE SUMMARY    Patient Name: Charlette Rai  : 1935  MRN: [id number]    Date of Admission: 2024  Date of Discharge:   Primary Care Physician: Rafael Lyons MD    Consults       Date and Time Order Name Status Description    2024 11:14 AM Inpatient Vascular Surgery Consult Completed     2024  9:44 AM Inpatient Pulmonology Consult      2024  9:44 AM Inpatient Cardiology Consult      2024  7:51 PM Inpatient Nephrology Consult Completed     2024  4:52 PM IP General Consult (Use specialty-specific consult if known)      4/15/2024  7:53 AM Inpatient Nephrology Consult Completed             Presenting Problem:   Acute exacerbation of chronic obstructive pulmonary disease (COPD) [J44.1]  COPD with acute exacerbation [J44.1]  COPD exacerbation [J44.1]    Active and Resolved Hospital Problems:  Active Hospital Problems    Diagnosis POA   • Pseudomonas pneumonia [J15.1] Yes   • ESRD (end stage renal disease) [N18.6] Yes   • ESRD (end stage renal disease) [N18.6] Unknown   • Aortic stenosis, moderate [I35.0] Yes   • Anemia of chronic disease [D63.8] Yes   • Essential hypertension [I10] Yes      Resolved Hospital Problems    Diagnosis POA   • COPD exacerbation [J44.1] Yes   • COPD with acute exacerbation [J44.1] Unknown   • Acute on chronic heart failure with preserved ejection fraction (HFpEF) [I50.33] Yes   • Chronic obstructive pulmonary disease with acute exacerbation [J44.1] Yes         Hospital Course     Hospital Course:  Charlette Rai is a 89 y.o. female admitted to hospital for recurrent shortness of breath and hypoxic episodes.  Patient has underlying COPD, CHF, end-stage renal disease.  Patient has several admission in last few months with the same problem.  This time pulmonologist Dr. Murray was consulted along with cardiologist Dr. Sanchez and continued followed up with nephrologist Dr. García.    Patient was dialyzed she was  started on nebs and steroids for shortness of breath and along with cardiology consult for management of CHF.  Dr. Sanchez saw patient and adjusted medications adding Isordil and Cozaar.    Dr. García managed dialysis.      Dr. Murray pulmonologist performed bronchoscopy and cultures grew Pseudomonas as well as some viral studies were positive.  Patient was treated with steroids and antibiotics for Pseudomonas.  She started feeling better.    She was also seen and evaluated by Dr. 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    For last 3 days patient is feeling better she has been treated with IV cefepime.  She feels comfortable and wants to go home she does not want to go to inpatient rehab.  At this point she will be discharged to home with outpatient follow-up recommendations.  I spoke to patient's  and daughter at bedside and informed patient of decision and will be discharged home      DISCHARGE Follow Up Recommendations for labs and diagnostics:   Patient stable ready for discharge.  Advised to take medications as prescribed on discharge.  Recommended follow-up with consultants as advised.  Patient advised to return to ER if symptoms get worse.  Patient advised to follow-up with me/PCP post discharge in 1 week.      Day of Discharge     Vital Signs:  Temp:  [97.6 °F (36.4 °C)-98.1 °F (36.7 °C)] 97.8 °F (36.6 °C)  Heart Rate:  [] 81  Resp:  [16-20] 18  BP: (138-191)/(41-69) 175/42  Flow (L/min):  [3] 3    Physical Exam:    Early female not in acute distress.  Heart regular.  Lungs diminished breath sounds but clear.  Abdomen soft.  Extremities no edema      Pertinent  and/or Most Recent Results     LAB RESULTS:      Lab 04/29/24  0533 04/28/24  0605 04/25/24  0617 04/23/24  0552   WBC 6.80 6.34 7.19 9.76   HEMOGLOBIN 8.4* 8.2* 8.2* 8.1*   HEMATOCRIT 28.0* 28.4* 28.3* 26.8*   PLATELETS 132* 153 174 183   NEUTROS ABS 4.81 4.57  --   --    IMMATURE GRANS (ABS) 0.08* 0.09*  --   --    LYMPHS ABS 0.75 0.66*  --    --    MONOS ABS 0.93* 0.80  --   --    EOS ABS 0.21 0.20  --   --    .8* 102.2* 102.5* 98.9*         Lab 04/29/24  0533 04/28/24  0605 04/26/24  0553 04/25/24  0617 04/24/24  0603 04/23/24  0552   SODIUM 137 139 136 141 136 136   POTASSIUM 3.9 4.1 4.6 4.0 4.1 4.2   CHLORIDE 106 105 104 108* 100 101   CO2 21.0* 24.9 20.6* 24.8 22.8 23.3   ANION GAP 10.0 9.1 11.4 8.2 13.2 11.7   BUN 41* 34* 33* 20 49* 33*   CREATININE 3.51* 3.26* 3.04* 2.33* 3.77* 3.11*   EGFR 12.1* 13.2* 14.4* 19.8* 11.1* 14.0*   GLUCOSE 81 83 119* 89 105* 110*   CALCIUM 8.1* 8.3* 7.9* 8.1* 7.9* 8.3*   PHOSPHORUS  --   --   --  2.8  --  3.4         Lab 04/25/24  0617 04/23/24  0552   ALBUMIN 2.9* 3.3*                 Lab 04/23/24  0552   IRON 82   IRON SATURATION (TSAT) 41   TIBC 200*   TRANSFERRIN 134*   FERRITIN 1,995.00*         Brief Urine Lab Results  (Last result in the past 365 days)        Color   Clarity   Blood   Leuk Est   Nitrite   Protein   CREAT   Urine HCG        03/03/24 1131 Yellow   Turbid   Moderate (2+)   Moderate (2+)   Negative   >=300 mg/dL (3+)                 Microbiology Results (last 10 days)       Procedure Component Value - Date/Time    Fungus Culture - Wash, Bronchus [[id number]] Collected: 04/24/24 1517    Lab Status: Preliminary result Specimen: Wash from Bronchus Updated: 04/29/24 1545     Fungus Culture No fungus isolated at less than 1 week    AFB Culture - Wash, Bronchus [[id number]] Collected: 04/24/24 1517    Lab Status: Preliminary result Specimen: Wash from Bronchus Updated: 04/29/24 1545     AFB Culture No AFB isolated at less than 1 week     AFB Stain No acid fast bacilli seen on direct smear      No acid fast bacilli seen on concentrated smear    Respiratory Culture - Wash, Bronchus [[id number]]  (Abnormal) Collected: 04/24/24 1517    Lab Status: Final result Specimen: Wash from Bronchus Updated: 04/28/24 0948     Respiratory Culture Moderate growth (3+) Pseudomonas aeruginosa     Comment: Refer to  respiratory culture collected 04/23/2024 @2235 for MICs.         Light growth (2+) Normal Respiratory Tori     Gram Stain Moderate (3+) WBCs seen      Rare (1+) Gram positive cocci    Fungus Culture - Lavage, Lung, Left Lower Lobe [[id number]] Collected: 04/24/24 1515    Lab Status: Preliminary result Specimen: Lavage from Lung, Left Lower Lobe Updated: 04/29/24 1545     Fungus Culture No fungus isolated at less than 1 week    AFB Culture - Lavage, Lung, Left Lower Lobe [[id number]] Collected: 04/24/24 1515    Lab Status: Preliminary result Specimen: Lavage from Lung, Left Lower Lobe Updated: 04/29/24 1545     AFB Culture No AFB isolated at less than 1 week     AFB Stain No acid fast bacilli seen on direct smear      No acid fast bacilli seen on concentrated smear    BAL Culture, Quantitative - Lavage, Lung, Left Lower Lobe [[id number]]  (Abnormal) Collected: 04/24/24 1515    Lab Status: Final result Specimen: Lavage from Lung, Left Lower Lobe Updated: 04/28/24 0948     BAL Culture 50,000 CFU/mL Pseudomonas aeruginosa     Comment: Refer to respiratory culture collected 04/23/2024 @2235 for MICs.         >100,000 CFU/mL Normal Respiratory Tori     Gram Stain Moderate (3+) WBCs seen      Rare (1+) Epithelial cells seen      Rare (1+) Gram positive cocci in pairs, chains and clusters    Pneumonia Panel - Lavage, Lung, Left Lower Lobe [[id number]]  (Abnormal) Collected: 04/24/24 1515    Lab Status: Final result Specimen: Lavage from Lung, Left Lower Lobe Updated: 04/25/24 2253     Escherichia coli PCR Not Detected     Acinetobacter calcoaceticus-baumannii complex PCR Not Detected     Enterobacter cloacae PCR Not Detected     Klebsiella oxytoca PCR Not Detected     Klebsiella pneumoniae group PCR Not Detected     Klebsiella aerogenes PCR Not Detected     Moraxella catarrhalis PCR Not Detected     Proteus species PCR Not Detected     Pseudomonas aeroginosa PCR Detected     Comment: 10^6 Bin copies/mL        Serratia  marcescens PCR Not Detected     Staphylococcus aureus PCR Not Detected     Streptococcus pyogenes PCR Not Detected     Haemophilus influenzae PCR Not Detected     Streptococcus agalactiae PCR Not Detected     Streptococcus pneumoniae PCR Not Detected     Chlamydophila pneumoniae PCR Not Detected     Legionella pneumophilia PCR Not Detected     Mycoplasma pneumo by PCR Not Detected     ADENOVIRUS, PCR Not Detected     CTX-M Gene Not Detected     IMP Gene Not Detected     KPC Gene Not Detected     mecA/C and MREJ Gene N/A     NDM Gene Not Detected     OXA-48-like Gene N/A     VIM Gene Not Detected     Coronavirus Not Detected     Human Metapneumovirus Detected     Human Rhinovirus/Enterovirus Not Detected     Influenza A PCR Not Detected     Influenza B PCR Not Detected     RSV, PCR Not Detected     Parainfluenza virus PCR Not Detected    Respiratory Culture - Sputum, Cough [[id number]]  (Abnormal)  (Susceptibility) Collected: 04/23/24 2235    Lab Status: Final result Specimen: Sputum from Cough Updated: 04/28/24 0942     Respiratory Culture Moderate growth (3+) Pseudomonas aeruginosa      No Normal Respiratory Tori     Gram Stain Rare (1+) Epithelial cells seen      Many (4+) WBCs seen      Moderate (3+) Gram negative bacilli      Few (2+) Gram positive cocci in pairs and clusters    Narrative:            Susceptibility        Pseudomonas aeruginosa      MARISA      Cefepime Susceptible      Ceftazidime Susceptible      Ciprofloxacin Susceptible      Levofloxacin Susceptible      Piperacillin + Tazobactam Susceptible      Tobramycin Susceptible                       Susceptibility Comments       Pseudomonas aeruginosa    With the exception of urinary-sourced infections, aminoglycosides should not be used as monotherapy.               Respiratory Culture - Sputum, Cough [[id number]] Collected: 04/22/24 1323    Lab Status: Final result Specimen: Sputum from Cough Updated: 04/23/24 1013     Respiratory Culture Rejected      Gram Stain Moderate (3+) Mucous strands      Few (2+) WBCs seen      Rare (1+) Gram positive cocci in clusters      Rare (1+) Squamous epithelial cells      Rare (1+) Gram positive bacilli      Rare (1+) Gram negative bacilli    Narrative:      Specimen rejected due to oropharyngeal contamination. Please reorder and recollect specimen if clinically necessary.    Respiratory Panel PCR w/COVID-19(SARS-CoV-2) [facility]ANNON/[facility] In-House, NP Swab in UTM/VTM, 2 HR TAT - Swab, Nasopharynx [[id number]]  (Abnormal) Collected: 04/21/24 2023    Lab Status: Final result Specimen: Swab from Nasopharynx Updated: 04/21/24 2208     ADENOVIRUS, PCR Not Detected     Coronavirus 229E Not Detected     Coronavirus HKU1 Not Detected     Coronavirus NL63 Not Detected     Coronavirus OC43 Not Detected     COVID19 Not Detected     Human Metapneumovirus Detected     Human Rhinovirus/Enterovirus Not Detected     Influenza A PCR Not Detected     Influenza B PCR Not Detected     Parainfluenza Virus 1 Not Detected     Parainfluenza Virus 2 Not Detected     Parainfluenza Virus 3 Not Detected     Parainfluenza Virus 4 Not Detected     RSV, PCR Not Detected     Bordetella pertussis pcr Not Detected     Bordetella parapertussis PCR Not Detected     Chlamydophila pneumoniae PCR Not Detected     Mycoplasma pneumo by PCR Not Detected    Narrative:      In the setting of a positive respiratory panel with a viral infection PLUS a negative procalcitonin without other underlying concern for bacterial infection, consider observing off antibiotics or discontinuation of antibiotics and continue supportive care. If the respiratory panel is positive for atypical bacterial infection (Bordetella pertussis, Chlamydophila pneumoniae, or Mycoplasma pneumoniae), consider antibiotic de-escalation to target atypical bacterial infection.    Blood Culture - Blood, Arm, Right [[id number]]  (Normal) Collected: 04/21/24 1722    Lab Status: Final result Specimen: Blood  from Arm, Right Updated: 04/26/24 1731     Blood Culture No growth at 5 days    Blood Culture - Blood, Hand, Right [[id number]]  (Normal) Collected: 04/21/24 1722    Lab Status: Final result Specimen: Blood from Hand, Right Updated: 04/26/24 1731     Blood Culture No growth at 5 days            PROCEDURES:    CT Chest Without Contrast Diagnostic    Result Date: 4/22/2024  Impression:  Interval placement of a right IJ tunneled dialysis catheter (TDC) is noted since 3/13/2024, as discussed.  There has been interval decrease in (if not complete resolution of) the small right pleural effusion since 3/13/2024.  There is an air-fluid level in the right mainstem bronchus. It may represent mucus. Aspirated content cannot be excluded.  No definite acute infiltrate.  Otherwise, no significant interval change is appreciated since the prior exam from 3/13/2024.    Electronically Signed By-Chicho Jones MD On:4/22/2024 9:33 PM      XR Chest 1 View    Result Date: 4/21/2024  Impression: Impression: No significant change in comparison to 4/17/2024, as above.   Electronically Signed By-SAVI LUCERO MD On:4/21/2024 1:48 PM      XR Chest 1 View    Result Date: 4/17/2024  Impression: Impression: No change from previous study. Chronic changes of the chest with post operative changes in the left thorax.   Electronically Signed By-Kimberli Alvarado MD On:4/17/2024 7:41 PM      XR Chest 1 View    Result Date: 4/14/2024  Impression: Stable appearance of the chest with no definite acute findings.  Electronically Signed By-Dr. Weston Castillo MD On:4/14/2024 10:38 PM      XR Chest 1 View    Result Date: 4/1/2024  Impression: IMPRESSION :  1. Mild prominence of the interstitial markings again noted slightly increased in the comparison. Findings represent underlying interstitial edema given findings on recent CT. Superimposed pneumonia is not excluded. [  Electronically Signed By-Eleazar Burris On:4/1/2024 9:20 AM       Results for orders placed  during the hospital encounter of 03/13/24    Duplex Venous Lower Extremity - Right CAR    Interpretation Summary  •  Normal right lower extremity venous duplex scan.      Results for orders placed during the hospital encounter of 03/13/24    Duplex Venous Lower Extremity - Right CAR    Interpretation Summary  •  Normal right lower extremity venous duplex scan.      Results for orders placed during the hospital encounter of 10/29/23    Adult Transthoracic Echo Complete W/ Cont if Necessary Per Protocol    Interpretation Summary  •  Left ventricular systolic function is normal. Calculated left ventricular EF = 56.8%  •  Left ventricular wall thickness is consistent with mild asymmetric hypertrophy.  •  Left ventricular diastolic function is consistent with (grade I) impaired relaxation.  •  Left atrial volume is moderately increased.  •  Moderate aortic valve stenosis is present.  •  Estimated right ventricular systolic pressure from tricuspid regurgitation is normal (<35 mmHg).  •  Mild dilation of the aortic root is present.      Labs Pending at Discharge:  Pending Labs       Order Current Status    AFB Culture - Lavage, Lung, Left Lower Lobe Preliminary result    AFB Culture - Wash, Bronchus Preliminary result    Fungus Culture - Lavage, Lung, Left Lower Lobe Preliminary result    Fungus Culture - Wash, Bronchus Preliminary result              Discharge Details        Discharge Medications        New Medications        Instructions Start Date   ipratropium-albuterol 0.5-2.5 mg/3 ml nebulizer  Commonly known as: DUO-NEB   3 mL, Nebulization, Every 4 Hours PRN      isosorbide dinitrate 20 MG tablet  Commonly known as: ISORDIL   20 mg, Oral, 3 Times Daily (Nitrates)      levoFLOXacin 250 MG tablet  Commonly known as: Levaquin   250 mg, Oral, Daily      losartan 25 MG tablet  Commonly known as: COZAAR   50 mg, Oral, Every 24 Hours Scheduled   Start Date: April 30, 2024     predniSONE 10 MG tablet  Commonly known as:  DELTASONE   10 mg, Oral, Daily             Changes to Medications        Instructions Start Date   levocetirizine 5 MG tablet  Commonly known as: XYZAL  What changed: when to take this   TAKE 1 TABLET DAILY             Continue These Medications        Instructions Start Date   albuterol sulfate  (90 Base) MCG/ACT inhaler  Commonly known as: PROVENTIL HFA;VENTOLIN HFA;PROAIR HFA   2 puffs, Inhalation, Every 4 Hours PRN      budesonide-formoterol 160-4.5 MCG/ACT inhaler  Commonly known as: SYMBICORT   2 puffs, Inhalation, 2 Times Daily - RT      carvedilol 25 MG tablet  Commonly known as: Coreg   25 mg, Oral, 2 Times Daily With Meals      dexlansoprazole 60 MG capsule  Commonly known as: DEXILANT   60 mg, Oral, Daily      ergocalciferol 1.25 MG (43042 UT) capsule  Commonly known as: ERGOCALCIFEROL   50,000 Units, Oral, Every 14 Days      hydrALAZINE 25 MG tablet  Commonly known as: APRESOLINE   25 mg, Oral, Every 12 Hours Scheduled      linagliptin 5 MG tablet tablet  Commonly known as: Tradjenta   5 mg, Oral, Daily      nitroglycerin 0.4 MG SL tablet  Commonly known as: NITROSTAT   Place 1 tablet under the tongue Every 5 (Five) Minutes As Needed for Chest Pain.      polycarbophil 625 MG tablet tablet   625 mg, Oral, Daily PRN      rosuvastatin 20 MG tablet  Commonly known as: CRESTOR   20 mg, Oral, Daily      sevelamer 800 MG tablet  Commonly known as: RENVELA   800 mg, Oral, 3 Times Daily With Meals      traMADol 50 MG tablet  Commonly known as: ULTRAM   50 mg, Oral, Every 8 Hours PRN      Trulicity 1.5 MG/0.5ML solution pen-injector  Generic drug: Dulaglutide   Inject 1.5 mg under the skin into the appropriate area as directed Every 14 (Fourteen) Days.             Stop These Medications      doxycycline 100 MG capsule  Commonly known as: MONODOX              No Known Allergies      Discharge Disposition:    Home-Health Care Svc    Diet:    Heart healthy renal diet    Discharge Activity:     Activity  Instructions       Activity as Tolerated              Future Appointments   Date Time Provider Department Center   5/16/2024  3:15 PM Jazzy Addison APRN [facility] [place] [facility]       Additional Instructions for the Follow-ups that You Need to Schedule       Discharge Follow-up with PCP   As directed       Currently Documented PCP:    Rafael Lyons MD    PCP Phone Number:    [phone]     Follow Up Details: Next week        Discharge Follow-up with Specified Provider: Dr. Murray in 2 weeks to continue with hemodialysis with nephrology as recommended   As directed      To: Dr. Murray in 2 weeks to continue with hemodialysis with nephrology as recommended                Time spent on Discharge including face to face service: 36 minutes.            I have dictated this note utilizing Dragon Dictation.             Please note that portions of this note were completed with a voice recognition program.             Part of this note may be an electronic transcription/translation of spoken language to printed text         using the Dragon Dictation System.       Electronically signed by Rafael Lyons MD, 04/29/24, 6:27 PM EDT.

## 2024-04-29 NOTE — CONSULTS
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 female admitted due to shortness of breath.  He has been identified that she is experiencing left hand symptoms associated with her dialysis access.  We have been asked to evaluate from the vascular standpoint.  Mrs. Rai is known to our service.  She first had a left arm fistula created, a basilic vein transposition, on 5/10/2022.  This fistula had to be ligated on 3/23/2023 when she developed significant steal syndrome.  At that time she was not yet on dialysis and decision was made to not proceed with a new access.  In 2023 she had to initiate hemodialysis relatively urgently and a new AV graft was created on 2023 using an AcuSeal graft.  The graft was able to be used shortly after.  She has have several events of thrombosis of the graft but it has been reopened successfully.  She does however have some numbness in her left and occasionally it gets painful.  When it gets painful she warms up her hand and the pain goes away.  We have been asked to evaluate from the vascular standpoint.    Review of Systems    Noncontributory except for the history of present illness.    Personal History     Past Medical History:   Diagnosis Date    Allergic rhinitis     Anemia     Arthritis     Asthma     USE INHALERS AND NEBULIZERS    Back pain     Bladder disorder     Cancer     LEFT LUNG CANCER  SURGERY AND CHEMO DONE  AND CURRENTLY   RIGHT LUNG CANCER HAS ONLY RECEIVED RADIATION THUS FAR    Chronic kidney disease     stage 3    CKD (chronic kidney disease) stage 4, GFR 15-29 ml/min     Condition not found     ulcer    Congestive heart failure (CHF)     FOLLOWED BY   AQUINO. DENIES CP BUT DOES HAVE SOA CHRONIC ISSUE COPD/LUNG CANCER    COPD (chronic obstructive pulmonary disease)     FOLLOWED BY DR MARIAJOSE BAILEY    Coronary artery disease     DENIES CP BUT DOES GET SOA MOST OF THE TIME WITH EXERTION BUT OCC AT REST CHRONIC ISSUE COPD/LUNG CANCER    Deep vein thrombosis     Diabetes mellitus     DOES NOT CHECK BS DAILY    Disease of thyroid gland     HYPOTHYROIDISM    Essential hypertension     Gastric ulcer     GERD (gastroesophageal reflux disease)     Heart murmur     History of transfusion     NO ISSUES POST TRANSFUSION WAS MANY YEARS AGO    Hyperlipemia     Leukocytopenia     FOLLOWED BY DR MIR BAILEY    Limb swelling     Lumbago     Lumbar spinal stenosis     Lung cancer     Migraine headache     Multiple joint pain     On home oxygen therapy     3L/NC PRN    Osteopenia     Reflux esophagitis     Shortness of breath     Thyroid nodule     HAS ULTRASOUND YEARLY BEING MONITORED    Vascular disease     Vitamin D deficiency        Past Surgical History:   Procedure Laterality Date    ABDOMINAL SURGERY      APPENDECTOMY      ARTERIOVENOUS FISTULA/SHUNT SURGERY Left 05/10/2022    Procedure: Left basilic vein transposition;  Surgeon: Wan Barksdale MD;  Location: [facility] MAIN OR;  Service: Vascular;  Laterality: Left;    ARTERIOVENOUS FISTULA/SHUNT SURGERY Left 3/23/2023    Procedure: Ligation of left arm arteriovenous fistula;  Surgeon: Wan Barksdale MD;  Location: [facility] MAIN OR;  Service: Vascular;  Laterality: Left;    ARTERIOVENOUS FISTULA/SHUNT SURGERY Left 11/30/2023    Procedure: Creation of left arm arteriovenous graft;  Surgeon: Wan Barksdale MD;  Location: [facility] MAIN OR;  Service: Vascular;  Laterality: Left;    BRONCHOSCOPY N/A 4/24/2024    Procedure: BRONCHOSCOPY WITH BAL AND WASHINGS;  Surgeon: Khalif Yanez MD;  Location: [facility] ENDOSCOPY;  Service: Pulmonary;  Laterality: N/A;  MUCUS PLUGGING    CARDIAC CATHETERIZATION  1996    CARDIAC SURGERY      CARDIAC SURGERY       fluid drained from heart    CATARACT EXTRACTION, BILATERAL  2003    COLONOSCOPY  2014    ENDOSCOPY  2016 2019    FEMORAL ARTERY STENT Bilateral     HYSTERECTOMY      LUNG BIOPSY Left 2005    lobectomy upper lung caner    LUNG VOLUME REDUCTION      OTHER SURGICAL HISTORY      artifical joints/limbs    REPLACEMENT TOTAL KNEE Left 2016    UPPER GASTROINTESTINAL ENDOSCOPY         Family History: family history includes Arthritis in her father and mother; Cancer in her brother, brother, brother, and brother; Diabetes in her brother; Other in her brother; Prostate cancer in her brother and brother. Otherwise pertinent FHx was reviewed and not pertinent to current issue.    Social History:  reports that she quit smoking about 19 years ago. Her smoking use included cigarettes. She started smoking about 72 years ago. She has a 52.5 pack-year smoking history. She has been exposed to tobacco smoke. She has never used smokeless tobacco. She reports that she does not drink alcohol and does not use drugs.    Home Medications:  Dulaglutide, albuterol sulfate HFA, budesonide-formoterol, carvedilol, dexlansoprazole, ergocalciferol, hydrALAZINE, levocetirizine, linagliptin, nitroglycerin, polycarbophil, rosuvastatin, sevelamer, and traMADol      Allergies:  No Known Allergies    Objective   Objective     Vitals:   Temp:  [97.6 °F (36.4 °C)-98.1 °F (36.7 °C)] 97.8 °F (36.6 °C)  Heart Rate:  [] 81  Resp:  [16-20] 18  BP: (138-191)/(41-69) 175/42  Flow (L/min):  [3] 3    Physical Exam    General: Awake, alert, NAD   Eyes:  SANTIAGO   Neck: Supple   Lungs: Clear   Heart: RRR   Abdomen: benign   Musculoskeletal:  normal motor tone, symmetric   Skin: warm, normal turgor   Neuro: strength 5/5 all extremities  Left arm: AV graft with excellent thrill.   Pulses: Nonpalpable left radial pulse.     Assessment & Plan   Assessment / Plan     Active Hospital Problems:  Active Hospital Problems    Diagnosis     ESRD (end stage renal  disease)     COPD exacerbation     COPD with acute exacerbation     ESRD (end stage renal disease)     Aortic stenosis, moderate     Anemia of chronic disease     Essential hypertension     Acute on chronic heart failure with preserved ejection fraction (HFpEF)     Chronic obstructive pulmonary disease with acute exacerbation        Assessment/plan:   Mrs. Rai is experiencing still syndrome of her left hand associated with her graft.  This graft originates in the axillary artery.  She previously had similar symptoms requiring for a previous access to be ligated.  I have discussed with her at this time management options.  She would prefer not to have any other surgery and she feels that so far she can manage her symptoms.  We will continue to treat conservatively for now.  Follow-up with us as needed.        Electronically signed by Wan Barksdale MD, 04/29/24, 3:14 PM EDT.

## 2024-05-01 ENCOUNTER — APPOINTMENT (OUTPATIENT)
Dept: GENERAL RADIOLOGY | Facility: HOSPITAL | Age: 89
DRG: 291 | End: 2024-05-01
Payer: MEDICARE

## 2024-05-01 ENCOUNTER — HOSPITAL ENCOUNTER (INPATIENT)
Facility: HOSPITAL | Age: 89
LOS: 3 days | Discharge: NURSING, REHAB OR LONG-TERM CARE | DRG: 291 | End: 2024-05-07
Attending: EMERGENCY MEDICINE | Admitting: INTERNAL MEDICINE
Payer: MEDICARE

## 2024-05-01 DIAGNOSIS — R41.0 CONFUSION: Primary | ICD-10-CM

## 2024-05-01 DIAGNOSIS — J44.9 CHRONIC OBSTRUCTIVE PULMONARY DISEASE, UNSPECIFIED COPD TYPE: ICD-10-CM

## 2024-05-01 DIAGNOSIS — N18.6 ESRD (END STAGE RENAL DISEASE): ICD-10-CM

## 2024-05-01 DIAGNOSIS — N18.9 CHRONIC KIDNEY DISEASE, UNSPECIFIED CKD STAGE: ICD-10-CM

## 2024-05-01 DIAGNOSIS — Z78.9 DECREASED ACTIVITIES OF DAILY LIVING (ADL): ICD-10-CM

## 2024-05-01 DIAGNOSIS — D64.9 CHRONIC ANEMIA: ICD-10-CM

## 2024-05-01 DIAGNOSIS — M54.6 CHRONIC MIDLINE THORACIC BACK PAIN: ICD-10-CM

## 2024-05-01 DIAGNOSIS — R53.1 WEAKNESS: ICD-10-CM

## 2024-05-01 DIAGNOSIS — R26.2 DIFFICULTY IN WALKING: ICD-10-CM

## 2024-05-01 DIAGNOSIS — G89.29 CHRONIC MIDLINE THORACIC BACK PAIN: ICD-10-CM

## 2024-05-01 LAB
ALBUMIN SERPL-MCNC: 3.3 G/DL (ref 3.5–5.2)
ALBUMIN/GLOB SERPL: 1.7 G/DL
ALP SERPL-CCNC: 134 U/L (ref 39–117)
ALT SERPL W P-5'-P-CCNC: 15 U/L (ref 1–33)
AMMONIA BLD-SCNC: 16 UMOL/L (ref 11–51)
AMPHET+METHAMPHET UR QL: NEGATIVE
ANION GAP SERPL CALCULATED.3IONS-SCNC: 9.4 MMOL/L (ref 5–15)
AST SERPL-CCNC: 12 U/L (ref 1–32)
BACTERIA UR QL AUTO: ABNORMAL /HPF
BARBITURATES UR QL SCN: NEGATIVE
BASOPHILS # BLD AUTO: 0.02 10*3/MM3 (ref 0–0.2)
BASOPHILS NFR BLD AUTO: 0.3 % (ref 0–1.5)
BENZODIAZ UR QL SCN: NEGATIVE
BILIRUB SERPL-MCNC: 0.2 MG/DL (ref 0–1.2)
BILIRUB UR QL STRIP: ABNORMAL
BUN SERPL-MCNC: 10 MG/DL (ref 8–23)
BUN/CREAT SERPL: 5.8 (ref 7–25)
CALCIUM SPEC-SCNC: 8.1 MG/DL (ref 8.6–10.5)
CANNABINOIDS SERPL QL: NEGATIVE
CHLORIDE SERPL-SCNC: 102 MMOL/L (ref 98–107)
CLARITY UR: ABNORMAL
CO2 SERPL-SCNC: 24.6 MMOL/L (ref 22–29)
COCAINE UR QL: NEGATIVE
COLOR UR: ABNORMAL
CREAT SERPL-MCNC: 1.73 MG/DL (ref 0.57–1)
D-LACTATE SERPL-SCNC: 1.8 MMOL/L (ref 0.5–2)
DEPRECATED RDW RBC AUTO: 62.9 FL (ref 37–54)
EGFRCR SERPLBLD CKD-EPI 2021: 28.3 ML/MIN/1.73
EOSINOPHIL # BLD AUTO: 0.05 10*3/MM3 (ref 0–0.4)
EOSINOPHIL NFR BLD AUTO: 0.7 % (ref 0.3–6.2)
ERYTHROCYTE [DISTWIDTH] IN BLOOD BY AUTOMATED COUNT: 17.2 % (ref 12.3–15.4)
FENTANYL UR-MCNC: NEGATIVE NG/ML
FUNGUS WND CULT: ABNORMAL
FUNGUS WND CULT: ABNORMAL
GEN 5 2HR TROPONIN T REFLEX: 68 NG/L
GLOBULIN UR ELPH-MCNC: 1.9 GM/DL
GLUCOSE BLDC GLUCOMTR-MCNC: 234 MG/DL (ref 70–99)
GLUCOSE SERPL-MCNC: 228 MG/DL (ref 65–99)
GLUCOSE UR STRIP-MCNC: NEGATIVE MG/DL
HCT VFR BLD AUTO: 28.1 % (ref 34–46.6)
HGB BLD-MCNC: 8.5 G/DL (ref 12–15.9)
HGB UR QL STRIP.AUTO: NEGATIVE
HOLD SPECIMEN: NORMAL
HOLD SPECIMEN: NORMAL
HYALINE CASTS UR QL AUTO: ABNORMAL /LPF
IMM GRANULOCYTES # BLD AUTO: 0.04 10*3/MM3 (ref 0–0.05)
IMM GRANULOCYTES NFR BLD AUTO: 0.6 % (ref 0–0.5)
KETONES UR QL STRIP: ABNORMAL
LEUKOCYTE ESTERASE UR QL STRIP.AUTO: ABNORMAL
LYMPHOCYTES # BLD AUTO: 0.53 10*3/MM3 (ref 0.7–3.1)
LYMPHOCYTES NFR BLD AUTO: 7.5 % (ref 19.6–45.3)
MAGNESIUM SERPL-MCNC: 1.7 MG/DL (ref 1.6–2.4)
MCH RBC QN AUTO: 30.6 PG (ref 26.6–33)
MCHC RBC AUTO-ENTMCNC: 30.2 G/DL (ref 31.5–35.7)
MCV RBC AUTO: 101.1 FL (ref 79–97)
METHADONE UR QL SCN: NEGATIVE
MONOCYTES # BLD AUTO: 0.76 10*3/MM3 (ref 0.1–0.9)
MONOCYTES NFR BLD AUTO: 10.7 % (ref 5–12)
MYCOBACTERIUM SPEC CULT: NORMAL
MYCOBACTERIUM SPEC CULT: NORMAL
NEUTROPHILS NFR BLD AUTO: 5.71 10*3/MM3 (ref 1.7–7)
NEUTROPHILS NFR BLD AUTO: 80.2 % (ref 42.7–76)
NIGHT BLUE STAIN TISS: NORMAL
NITRITE UR QL STRIP: NEGATIVE
NRBC BLD AUTO-RTO: 0 /100 WBC (ref 0–0.2)
OPIATES UR QL: NEGATIVE
OXYCODONE UR QL SCN: NEGATIVE
PH UR STRIP.AUTO: <=5 [PH] (ref 5–8)
PLATELET # BLD AUTO: 123 10*3/MM3 (ref 140–450)
PMV BLD AUTO: 9.6 FL (ref 6–12)
POTASSIUM SERPL-SCNC: 3.9 MMOL/L (ref 3.5–5.2)
PROT SERPL-MCNC: 5.2 G/DL (ref 6–8.5)
PROT UR QL STRIP: ABNORMAL
RBC # BLD AUTO: 2.78 10*6/MM3 (ref 3.77–5.28)
RBC # UR STRIP: ABNORMAL /HPF
REF LAB TEST METHOD: ABNORMAL
SODIUM SERPL-SCNC: 136 MMOL/L (ref 136–145)
SP GR UR STRIP: 1.02 (ref 1–1.03)
SQUAMOUS #/AREA URNS HPF: ABNORMAL /HPF
T4 FREE SERPL-MCNC: 1.34 NG/DL (ref 0.92–1.68)
TROPONIN T DELTA: 1 NG/L
TROPONIN T SERPL HS-MCNC: 67 NG/L
TSH SERPL DL<=0.05 MIU/L-ACNC: 3.63 UIU/ML (ref 0.27–4.2)
UROBILINOGEN UR QL STRIP: ABNORMAL
WBC # UR STRIP: ABNORMAL /HPF
WBC NRBC COR # BLD AUTO: 7.11 10*3/MM3 (ref 3.4–10.8)
WHOLE BLOOD HOLD COAG: NORMAL
WHOLE BLOOD HOLD SPECIMEN: NORMAL
YEAST URNS QL MICRO: ABNORMAL /HPF

## 2024-05-01 PROCEDURE — 84439 ASSAY OF FREE THYROXINE: CPT | Performed by: EMERGENCY MEDICINE

## 2024-05-01 PROCEDURE — 71045 X-RAY EXAM CHEST 1 VIEW: CPT

## 2024-05-01 PROCEDURE — 94799 UNLISTED PULMONARY SVC/PX: CPT

## 2024-05-01 PROCEDURE — 93005 ELECTROCARDIOGRAM TRACING: CPT

## 2024-05-01 PROCEDURE — 94640 AIRWAY INHALATION TREATMENT: CPT

## 2024-05-01 PROCEDURE — 80307 DRUG TEST PRSMV CHEM ANLYZR: CPT | Performed by: EMERGENCY MEDICINE

## 2024-05-01 PROCEDURE — 84484 ASSAY OF TROPONIN QUANT: CPT

## 2024-05-01 PROCEDURE — 99285 EMERGENCY DEPT VISIT HI MDM: CPT

## 2024-05-01 PROCEDURE — 83735 ASSAY OF MAGNESIUM: CPT

## 2024-05-01 PROCEDURE — 93005 ELECTROCARDIOGRAM TRACING: CPT | Performed by: EMERGENCY MEDICINE

## 2024-05-01 PROCEDURE — 25810000003 SODIUM CHLORIDE 0.9 % SOLUTION: Performed by: EMERGENCY MEDICINE

## 2024-05-01 PROCEDURE — 80053 COMPREHEN METABOLIC PANEL: CPT

## 2024-05-01 PROCEDURE — 84484 ASSAY OF TROPONIN QUANT: CPT | Performed by: EMERGENCY MEDICINE

## 2024-05-01 PROCEDURE — 84443 ASSAY THYROID STIM HORMONE: CPT | Performed by: EMERGENCY MEDICINE

## 2024-05-01 PROCEDURE — 83605 ASSAY OF LACTIC ACID: CPT | Performed by: EMERGENCY MEDICINE

## 2024-05-01 PROCEDURE — 85025 COMPLETE CBC W/AUTO DIFF WBC: CPT

## 2024-05-01 PROCEDURE — 36415 COLL VENOUS BLD VENIPUNCTURE: CPT

## 2024-05-01 PROCEDURE — 82140 ASSAY OF AMMONIA: CPT | Performed by: EMERGENCY MEDICINE

## 2024-05-01 PROCEDURE — 82948 REAGENT STRIP/BLOOD GLUCOSE: CPT

## 2024-05-01 PROCEDURE — 93010 ELECTROCARDIOGRAM REPORT: CPT | Performed by: INTERNAL MEDICINE

## 2024-05-01 PROCEDURE — 87040 BLOOD CULTURE FOR BACTERIA: CPT | Performed by: EMERGENCY MEDICINE

## 2024-05-01 PROCEDURE — 81001 URINALYSIS AUTO W/SCOPE: CPT | Performed by: EMERGENCY MEDICINE

## 2024-05-01 RX ORDER — ACETAMINOPHEN 325 MG/1
650 TABLET ORAL ONCE
Status: COMPLETED | OUTPATIENT
Start: 2024-05-01 | End: 2024-05-01

## 2024-05-01 RX ORDER — SODIUM CHLORIDE 9 MG/ML
250 INJECTION, SOLUTION INTRAVENOUS CONTINUOUS
Status: DISCONTINUED | OUTPATIENT
Start: 2024-05-01 | End: 2024-05-01

## 2024-05-01 RX ORDER — SODIUM CHLORIDE 0.9 % (FLUSH) 0.9 %
10 SYRINGE (ML) INJECTION AS NEEDED
Status: DISCONTINUED | OUTPATIENT
Start: 2024-05-01 | End: 2024-05-07 | Stop reason: HOSPADM

## 2024-05-01 RX ORDER — IPRATROPIUM BROMIDE AND ALBUTEROL SULFATE 2.5; .5 MG/3ML; MG/3ML
3 SOLUTION RESPIRATORY (INHALATION) ONCE
Status: COMPLETED | OUTPATIENT
Start: 2024-05-01 | End: 2024-05-01

## 2024-05-01 RX ORDER — LOSARTAN POTASSIUM 25 MG/1
2 TABLET ORAL EVERY 24 HOURS
COMMUNITY
Start: 2024-05-01

## 2024-05-01 RX ADMIN — IPRATROPIUM BROMIDE AND ALBUTEROL SULFATE 3 ML: .5; 3 SOLUTION RESPIRATORY (INHALATION) at 22:03

## 2024-05-01 RX ADMIN — ACETAMINOPHEN 650 MG: 325 TABLET ORAL at 22:15

## 2024-05-01 RX ADMIN — SODIUM CHLORIDE 250 ML/HR: 9 INJECTION, SOLUTION INTRAVENOUS at 20:49

## 2024-05-01 NOTE — ED TRIAGE NOTES
"PT ARRIVED VIA EMS FROM HOME W/ REPORTS OF AMS AND CONFUSION. PT IS ALERT AND ORIENTED BUT \"FEELS\" CONFUSED. EMS REPORTS HYPOTENSION UPON ARRIVAL 80/40.  PER EMS. 20 GA IV STARTED IN RT WRIST. PT ALSO HAS NO APPETITE. PT WEARS 2L 02 PRN.    PT WAS RECENTLY D/C FROM THIS FACILITY ON MONDAY R/T LUNG INFECTION.    PT IS ON DIALYSIS M/W/F AT [facility] IN [place]. HAD DIALYSIS TODAY. AVG IN AD. PT ALSO HAS CVC. DOES NOT PRODUCE MUCH URINE. ORBITAL EDEMA NOTED.      "

## 2024-05-02 ENCOUNTER — APPOINTMENT (OUTPATIENT)
Dept: GENERAL RADIOLOGY | Facility: HOSPITAL | Age: 89
DRG: 291 | End: 2024-05-02
Payer: MEDICARE

## 2024-05-02 ENCOUNTER — READMISSION MANAGEMENT (OUTPATIENT)
Dept: CALL CENTER | Facility: HOSPITAL | Age: 89
End: 2024-05-02
Payer: MEDICARE

## 2024-05-02 PROBLEM — Z87.01 HISTORY OF RECENT PNEUMONIA: Status: ACTIVE | Noted: 2024-05-02

## 2024-05-02 PROBLEM — R41.0 CONFUSION: Status: ACTIVE | Noted: 2024-05-02

## 2024-05-02 LAB
QT INTERVAL: 395 MS
QTC INTERVAL: 495 MS

## 2024-05-02 PROCEDURE — 25010000002 KETOROLAC TROMETHAMINE PER 15 MG: Performed by: EMERGENCY MEDICINE

## 2024-05-02 PROCEDURE — G0378 HOSPITAL OBSERVATION PER HR: HCPCS

## 2024-05-02 PROCEDURE — 63710000001 PREDNISONE PER 1 MG: Performed by: INTERNAL MEDICINE

## 2024-05-02 PROCEDURE — 94799 UNLISTED PULMONARY SVC/PX: CPT

## 2024-05-02 PROCEDURE — 99223 1ST HOSP IP/OBS HIGH 75: CPT | Performed by: INTERNAL MEDICINE

## 2024-05-02 PROCEDURE — 25010000002 CEFEPIME PER 500 MG: Performed by: INTERNAL MEDICINE

## 2024-05-02 PROCEDURE — 72072 X-RAY EXAM THORAC SPINE 3VWS: CPT

## 2024-05-02 RX ORDER — LEVOFLOXACIN 250 MG/1
250 TABLET, FILM COATED ORAL DAILY
Status: DISCONTINUED | OUTPATIENT
Start: 2024-05-02 | End: 2024-05-02

## 2024-05-02 RX ORDER — TEMAZEPAM 15 MG/1
15 CAPSULE ORAL NIGHTLY PRN
Status: DISCONTINUED | OUTPATIENT
Start: 2024-05-02 | End: 2024-05-07 | Stop reason: HOSPADM

## 2024-05-02 RX ORDER — ISOSORBIDE DINITRATE 20 MG/1
20 TABLET ORAL
Status: DISCONTINUED | OUTPATIENT
Start: 2024-05-02 | End: 2024-05-07 | Stop reason: HOSPADM

## 2024-05-02 RX ORDER — ASPIRIN 81 MG/1
81 TABLET ORAL DAILY
COMMUNITY

## 2024-05-02 RX ORDER — LOSARTAN POTASSIUM 50 MG/1
50 TABLET ORAL
Status: DISCONTINUED | OUTPATIENT
Start: 2024-05-02 | End: 2024-05-07 | Stop reason: HOSPADM

## 2024-05-02 RX ORDER — PREDNISONE 20 MG/1
40 TABLET ORAL DAILY
Status: COMPLETED | OUTPATIENT
Start: 2024-05-03 | End: 2024-05-03

## 2024-05-02 RX ORDER — ALPRAZOLAM 0.25 MG/1
0.25 TABLET ORAL EVERY 6 HOURS PRN
Status: DISCONTINUED | OUTPATIENT
Start: 2024-05-02 | End: 2024-05-07 | Stop reason: HOSPADM

## 2024-05-02 RX ORDER — SODIUM CHLORIDE 0.9 % (FLUSH) 0.9 %
10 SYRINGE (ML) INJECTION EVERY 12 HOURS SCHEDULED
Status: DISCONTINUED | OUTPATIENT
Start: 2024-05-02 | End: 2024-05-07 | Stop reason: HOSPADM

## 2024-05-02 RX ORDER — BISACODYL 10 MG
10 SUPPOSITORY, RECTAL RECTAL DAILY PRN
Status: DISCONTINUED | OUTPATIENT
Start: 2024-05-02 | End: 2024-05-07 | Stop reason: HOSPADM

## 2024-05-02 RX ORDER — TOBRAMYCIN INHALATION SOLUTION 300 MG/5ML
300 INHALANT RESPIRATORY (INHALATION)
Status: DISCONTINUED | OUTPATIENT
Start: 2024-05-02 | End: 2024-05-07 | Stop reason: HOSPADM

## 2024-05-02 RX ORDER — ALUMINA, MAGNESIA, AND SIMETHICONE 2400; 2400; 240 MG/30ML; MG/30ML; MG/30ML
15 SUSPENSION ORAL EVERY 6 HOURS PRN
Status: DISCONTINUED | OUTPATIENT
Start: 2024-05-02 | End: 2024-05-07 | Stop reason: HOSPADM

## 2024-05-02 RX ORDER — SODIUM CHLORIDE 9 MG/ML
40 INJECTION, SOLUTION INTRAVENOUS AS NEEDED
Status: DISCONTINUED | OUTPATIENT
Start: 2024-05-02 | End: 2024-05-07 | Stop reason: HOSPADM

## 2024-05-02 RX ORDER — ONDANSETRON 2 MG/ML
4 INJECTION INTRAMUSCULAR; INTRAVENOUS EVERY 4 HOURS PRN
Status: DISCONTINUED | OUTPATIENT
Start: 2024-05-02 | End: 2024-05-07 | Stop reason: HOSPADM

## 2024-05-02 RX ORDER — IPRATROPIUM BROMIDE AND ALBUTEROL SULFATE 2.5; .5 MG/3ML; MG/3ML
3 SOLUTION RESPIRATORY (INHALATION) ONCE
Status: COMPLETED | OUTPATIENT
Start: 2024-05-02 | End: 2024-05-02

## 2024-05-02 RX ORDER — TRAMADOL HYDROCHLORIDE 50 MG/1
50 TABLET ORAL EVERY 8 HOURS PRN
Status: ACTIVE | OUTPATIENT
Start: 2024-05-02 | End: 2024-05-03

## 2024-05-02 RX ORDER — BISACODYL 5 MG/1
5 TABLET, DELAYED RELEASE ORAL DAILY PRN
Status: DISCONTINUED | OUTPATIENT
Start: 2024-05-02 | End: 2024-05-07 | Stop reason: HOSPADM

## 2024-05-02 RX ORDER — FAMOTIDINE 20 MG/1
40 TABLET, FILM COATED ORAL DAILY
Status: DISCONTINUED | OUTPATIENT
Start: 2024-05-02 | End: 2024-05-07 | Stop reason: HOSPADM

## 2024-05-02 RX ORDER — SODIUM CHLORIDE 0.9 % (FLUSH) 0.9 %
10 SYRINGE (ML) INJECTION AS NEEDED
Status: DISCONTINUED | OUTPATIENT
Start: 2024-05-02 | End: 2024-05-07 | Stop reason: HOSPADM

## 2024-05-02 RX ORDER — POLYETHYLENE GLYCOL 3350 17 G/17G
17 POWDER, FOR SOLUTION ORAL DAILY PRN
Status: DISCONTINUED | OUTPATIENT
Start: 2024-05-02 | End: 2024-05-07 | Stop reason: HOSPADM

## 2024-05-02 RX ORDER — NALOXONE HCL 0.4 MG/ML
0.4 VIAL (ML) INJECTION
Status: DISCONTINUED | OUTPATIENT
Start: 2024-05-02 | End: 2024-05-07 | Stop reason: HOSPADM

## 2024-05-02 RX ORDER — BUDESONIDE 0.5 MG/2ML
0.5 INHALANT ORAL
Status: DISCONTINUED | OUTPATIENT
Start: 2024-05-02 | End: 2024-05-07 | Stop reason: HOSPADM

## 2024-05-02 RX ORDER — HYDRALAZINE HYDROCHLORIDE 25 MG/1
25 TABLET, FILM COATED ORAL EVERY 12 HOURS SCHEDULED
Status: DISCONTINUED | OUTPATIENT
Start: 2024-05-02 | End: 2024-05-05

## 2024-05-02 RX ORDER — KETOROLAC TROMETHAMINE 15 MG/ML
15 INJECTION, SOLUTION INTRAMUSCULAR; INTRAVENOUS ONCE
Status: COMPLETED | OUTPATIENT
Start: 2024-05-02 | End: 2024-05-02

## 2024-05-02 RX ORDER — CARVEDILOL 25 MG/1
25 TABLET ORAL 2 TIMES DAILY WITH MEALS
Status: DISCONTINUED | OUTPATIENT
Start: 2024-05-02 | End: 2024-05-04

## 2024-05-02 RX ORDER — ARFORMOTEROL TARTRATE 15 UG/2ML
15 SOLUTION RESPIRATORY (INHALATION)
Status: DISCONTINUED | OUTPATIENT
Start: 2024-05-02 | End: 2024-05-07 | Stop reason: HOSPADM

## 2024-05-02 RX ORDER — IPRATROPIUM BROMIDE AND ALBUTEROL SULFATE 2.5; .5 MG/3ML; MG/3ML
3 SOLUTION RESPIRATORY (INHALATION) EVERY 4 HOURS PRN
Status: DISCONTINUED | OUTPATIENT
Start: 2024-05-02 | End: 2024-05-07 | Stop reason: HOSPADM

## 2024-05-02 RX ORDER — ACETAMINOPHEN 325 MG/1
650 TABLET ORAL EVERY 4 HOURS PRN
Status: DISCONTINUED | OUTPATIENT
Start: 2024-05-02 | End: 2024-05-07 | Stop reason: HOSPADM

## 2024-05-02 RX ORDER — PREDNISONE 20 MG/1
10 TABLET ORAL DAILY
Status: DISCONTINUED | OUTPATIENT
Start: 2024-05-02 | End: 2024-05-02

## 2024-05-02 RX ORDER — AMOXICILLIN 250 MG
2 CAPSULE ORAL 2 TIMES DAILY
Status: DISCONTINUED | OUTPATIENT
Start: 2024-05-02 | End: 2024-05-07 | Stop reason: HOSPADM

## 2024-05-02 RX ADMIN — ARFORMOTEROL TARTRATE 15 MCG: 15 SOLUTION RESPIRATORY (INHALATION) at 18:58

## 2024-05-02 RX ADMIN — KETOROLAC TROMETHAMINE 15 MG: 15 INJECTION, SOLUTION INTRAMUSCULAR; INTRAVENOUS at 01:38

## 2024-05-02 RX ADMIN — Medication 10 ML: at 20:06

## 2024-05-02 RX ADMIN — CARVEDILOL 25 MG: 25 TABLET, FILM COATED ORAL at 10:12

## 2024-05-02 RX ADMIN — Medication 10 ML: at 10:13

## 2024-05-02 RX ADMIN — Medication 10 ML: at 01:39

## 2024-05-02 RX ADMIN — IPRATROPIUM BROMIDE AND ALBUTEROL SULFATE 3 ML: .5; 3 SOLUTION RESPIRATORY (INHALATION) at 00:47

## 2024-05-02 RX ADMIN — TOBRAMYCIN 300 MG: 300 SOLUTION ORAL at 19:00

## 2024-05-02 RX ADMIN — HYDRALAZINE HYDROCHLORIDE 25 MG: 25 TABLET ORAL at 10:13

## 2024-05-02 RX ADMIN — ISOSORBIDE DINITRATE 20 MG: 20 TABLET ORAL at 10:12

## 2024-05-02 RX ADMIN — BUDESONIDE 0.5 MG: 0.5 SUSPENSION RESPIRATORY (INHALATION) at 18:58

## 2024-05-02 RX ADMIN — SENNOSIDES AND DOCUSATE SODIUM 2 TABLET: 50; 8.6 TABLET ORAL at 20:06

## 2024-05-02 RX ADMIN — LOSARTAN POTASSIUM 50 MG: 50 TABLET, FILM COATED ORAL at 10:12

## 2024-05-02 RX ADMIN — PREDNISONE 10 MG: 20 TABLET ORAL at 10:13

## 2024-05-02 RX ADMIN — CEFEPIME 2000 MG: 2 INJECTION, POWDER, FOR SOLUTION INTRAVENOUS at 17:49

## 2024-05-02 RX ADMIN — FAMOTIDINE 40 MG: 20 TABLET ORAL at 10:12

## 2024-05-02 RX ADMIN — ISOSORBIDE DINITRATE 20 MG: 20 TABLET ORAL at 14:40

## 2024-05-02 RX ADMIN — LEVOFLOXACIN 250 MG: 250 TABLET, FILM COATED ORAL at 10:12

## 2024-05-02 NOTE — OUTREACH NOTE
COPD/PN Week 1 Survey      Flowsheet Row Responses   [facility] facility patient discharged from? [facility]   Does the patient have one of the following disease processes/diagnoses(primary or secondary)? COPD   Week 1 attempt successful? No   Unsuccessful attempts Attempt 1   Revoke Readmitted            Juliana H - Registered Nurse

## 2024-05-02 NOTE — H&P
[facility]   HISTORY AND PHYSICAL    Patient Name: Charlette Rai  : 1935  MRN: [id number]  Primary Care Physician:  Rafael Lyons MD  Date of admission: 2024    Subjective   Subjective     Chief Complaint:   Weakness, confusion, aches and pain      HPI:    Charlette Rai is a 89 y.o. female brought into ER post discharge from the hospital 2 days ago for nonspecific complaints of weakness confusion aches and pain.  Extensive workup done in ER patient stable but family does not want her to go home, ER physician had detailed discussion with patient and daughter but they were inclined that she needs to be admitted to hospital, patient admitted for further care.  Care physician Dr. Newton recommended inpatient rehab or nursing home placement but patient has been refusing this all along..  When I saw patient this morning she is awake alert and oriented.  No active complaints except for tiredness and fatigue.          Review of Systems:    No fever chills.  No chest pain.  Aches and pains in the back.  No nausea vomiting.  Poor appetite.  Confusion at times.      Personal History     Past Medical History:   Diagnosis Date   • Allergic rhinitis    • Anemia    • Arthritis    • Asthma     USE INHALERS AND NEBULIZERS   • Back pain    • Bladder disorder    • Cancer     LEFT LUNG CANCER  SURGERY AND CHEMO DONE  AND CURRENTLY   RIGHT LUNG CANCER HAS ONLY RECEIVED RADIATION THUS FAR   • Chronic kidney disease     stage 3   • CKD (chronic kidney disease) requiring chronic dialysis    • CKD (chronic kidney disease) stage 4, GFR 15-29 ml/min    • Condition not found     ulcer   • Congestive heart failure (CHF)     FOLLOWED BY DR AQUINO. DENIES CP BUT DOES HAVE SOA CHRONIC ISSUE COPD/LUNG CANCER   • COPD (chronic obstructive pulmonary disease)     FOLLOWED BY DR MARIAJOSE BAILEY   • Coronary artery disease     DENIES CP BUT DOES GET SOA MOST OF THE TIME WITH EXERTION BUT OCC AT REST CHRONIC ISSUE COPD/LUNG  CANCER   • Deep vein thrombosis    • Diabetes mellitus     DOES NOT CHECK BS DAILY   • Disease of thyroid gland     HYPOTHYROIDISM   • Essential hypertension    • Gastric ulcer    • GERD (gastroesophageal reflux disease)    • Heart murmur    • History of transfusion     NO ISSUES POST TRANSFUSION WAS MANY YEARS AGO   • Hyperlipemia    • Leukocytopenia     FOLLOWED BY DR MIR BAILEY   • Limb swelling    • Lumbago    • Lumbar spinal stenosis    • Lung cancer    • Migraine headache    • Multiple joint pain    • On home oxygen therapy     3L/NC PRN   • Osteopenia    • Reflux esophagitis    • Shortness of breath    • Thyroid nodule     HAS ULTRASOUND YEARLY BEING MONITORED   • Vascular disease    • Vitamin D deficiency        Past Surgical History:   Procedure Laterality Date   • ABDOMINAL SURGERY     • APPENDECTOMY     • ARTERIOVENOUS FISTULA/SHUNT SURGERY Left 05/10/2022    Procedure: Left basilic vein transposition;  Surgeon: Wan Barksdale MD;  Location: [facility] MAIN OR;  Service: Vascular;  Laterality: Left;   • ARTERIOVENOUS FISTULA/SHUNT SURGERY Left 3/23/2023    Procedure: Ligation of left arm arteriovenous fistula;  Surgeon: Wan Barksdale MD;  Location: [facility] MAIN OR;  Service: Vascular;  Laterality: Left;   • ARTERIOVENOUS FISTULA/SHUNT SURGERY Left 11/30/2023    Procedure: Creation of left arm arteriovenous graft;  Surgeon: Wan Barksdale MD;  Location: [facility] MAIN OR;  Service: Vascular;  Laterality: Left;   • BRONCHOSCOPY N/A 4/24/2024    Procedure: BRONCHOSCOPY WITH BAL AND WASHINGS;  Surgeon: Khalif Yanez MD;  Location: [facility] ENDOSCOPY;  Service: Pulmonary;  Laterality: N/A;  MUCUS PLUGGING   • CARDIAC CATHETERIZATION  1996   • CARDIAC SURGERY     • CARDIAC SURGERY      fluid drained from heart   • CATARACT EXTRACTION, BILATERAL  2003   • COLONOSCOPY  2014   • ENDOSCOPY  2016    2019   • FEMORAL ARTERY STENT Bilateral    • HYSTERECTOMY     • LUNG BIOPSY Left 2005    lobectomy upper lung caner   •  LUNG VOLUME REDUCTION     • OTHER SURGICAL HISTORY      artifical joints/limbs   • REPLACEMENT TOTAL KNEE Left 2016   • UPPER GASTROINTESTINAL ENDOSCOPY         Family History: family history includes Arthritis in her father and mother; Cancer in her brother, brother, brother, and brother; Diabetes in her brother; Other in her brother; Prostate cancer in her brother and brother. Otherwise pertinent FHx was reviewed and not pertinent to current issue.    Social History:  reports that she quit smoking about 19 years ago. Her smoking use included cigarettes. She started smoking about 72 years ago. She has a 52.5 pack-year smoking history. She has been exposed to tobacco smoke. She has never used smokeless tobacco. She reports that she does not drink alcohol and does not use drugs.    Home Medications:  Dulaglutide, albuterol sulfate HFA, budesonide-formoterol, carvedilol, dexlansoprazole, ergocalciferol, hydrALAZINE, ipratropium-albuterol, isosorbide dinitrate, levoFLOXacin, levocetirizine, linagliptin, losartan, nitroglycerin, polycarbophil, predniSONE, rosuvastatin, sevelamer, and traMADol      Allergies:  No Known Allergies    Objective   Objective     Vitals:   Temp:  [98.4 °F (36.9 °C)-98.9 °F (37.2 °C)] 98.4 °F (36.9 °C)  Heart Rate:  [80-97] 97  Resp:  [14-28] 18  BP: (113-175)/(40-57) 175/52  Flow (L/min):  [2] 2    Physical Exam    Elderly female looks older for her stated age, not in acute distress anxious and somewhat irritable.  Neck supple  Heart regular.  Lungs coarse breath sounds but clear  Abdomen soft.  Extremities no edema.  Neuro awake alert and oriented..      I have personally reviewed the results from the time of this admission to 5/2/2024 09:42 EDT and agree with these findings:  [x]  Laboratory  [x]  Microbiology  [x]  Radiology  [x]  EKG/Telemetry   []  Cardiology/Vascular   []  Pathology  []  Old records  []  Other:    CBC:    WBC   Date Value Ref Range Status   05/01/2024 7.11 3.40 - 10.80  10*3/mm3 Final     RBC   Date Value Ref Range Status   05/01/2024 2.78 (L) 3.77 - 5.28 10*6/mm3 Final     Hemoglobin   Date Value Ref Range Status   05/01/2024 8.5 (L) 12.0 - 15.9 g/dL Final     Hematocrit   Date Value Ref Range Status   05/01/2024 28.1 (L) 34.0 - 46.6 % Final     MCV   Date Value Ref Range Status   05/01/2024 101.1 (H) 79.0 - 97.0 fL Final     MCH   Date Value Ref Range Status   05/01/2024 30.6 26.6 - 33.0 pg Final     MCHC   Date Value Ref Range Status   05/01/2024 30.2 (L) 31.5 - 35.7 g/dL Final     RDW   Date Value Ref Range Status   05/01/2024 17.2 (H) 12.3 - 15.4 % Final     RDW-SD   Date Value Ref Range Status   05/01/2024 62.9 (H) 37.0 - 54.0 fl Final     MPV   Date Value Ref Range Status   05/01/2024 9.6 6.0 - 12.0 fL Final     Platelets   Date Value Ref Range Status   05/01/2024 123 (L) 140 - 450 10*3/mm3 Final     Neutrophil %   Date Value Ref Range Status   05/01/2024 80.2 (H) 42.7 - 76.0 % Final     Lymphocyte %   Date Value Ref Range Status   05/01/2024 7.5 (L) 19.6 - 45.3 % Final     Monocyte %   Date Value Ref Range Status   05/01/2024 10.7 5.0 - 12.0 % Final     Eosinophil %   Date Value Ref Range Status   05/01/2024 0.7 0.3 - 6.2 % Final     Basophil %   Date Value Ref Range Status   05/01/2024 0.3 0.0 - 1.5 % Final     Immature Grans %   Date Value Ref Range Status   05/01/2024 0.6 (H) 0.0 - 0.5 % Final     Neutrophils, Absolute   Date Value Ref Range Status   05/01/2024 5.71 1.70 - 7.00 10*3/mm3 Final     Lymphocytes, Absolute   Date Value Ref Range Status   05/01/2024 0.53 (L) 0.70 - 3.10 10*3/mm3 Final     Monocytes, Absolute   Date Value Ref Range Status   05/01/2024 0.76 0.10 - 0.90 10*3/mm3 Final     Eosinophils, Absolute   Date Value Ref Range Status   05/01/2024 0.05 0.00 - 0.40 10*3/mm3 Final     Basophils, Absolute   Date Value Ref Range Status   05/01/2024 0.02 0.00 - 0.20 10*3/mm3 Final     Immature Grans, Absolute   Date Value Ref Range Status   05/01/2024 0.04 0.00 -  0.05 10*3/mm3 Final     nRBC   Date Value Ref Range Status   05/01/2024 0.0 0.0 - 0.2 /100 WBC Final        BMP:    Lab Results   Component Value Date    GLUCOSE 228 (H) 05/01/2024    BUN 10 05/01/2024    CREATININE 1.73 (H) 05/01/2024    EGFRIFNONA 17 (L) 01/10/2022    BCR 5.8 (L) 05/01/2024    K 3.9 05/01/2024    CO2 24.6 05/01/2024    CALCIUM 8.1 (L) 05/01/2024    ALBUMIN 3.3 (L) 05/01/2024    LABIL2 1.5 05/05/2021    AST 12 05/01/2024    ALT 15 05/01/2024        XR Spine Thoracic 3 View    Result Date: 5/2/2024  Degenerative changes in the thoracic spine. No acute compression fractures. Alignment is stable.    Electronically Signed By-Dr. Weston Castillo MD On:5/2/2024 1:20 AM      XR Chest 1 View    Result Date: 5/1/2024  Impression: Stable exam. Stable chronic appearing changes in the left hemithorax and right midlung field.   Electronically Signed By-YARELIS LUCAS MD On:5/1/2024 8:29 PM              Assessment & Plan   Assessment / Plan       Current Diagnosis:  Active Hospital Problems    Diagnosis    • **Confusion    • History of recent pneumonia    • ESRD (end stage renal disease)    • Anemia of chronic disease      Plan:   Patient admitted to hospital per family request.  Complains of aches and pain and generalized weakness and nonspecific symptoms of occasional confusion.  Patient is alert oriented.  Upset that she does not want to be admitted to nursing home or rehab.  Patient unable to stay home she has several admissions in last 2 months to different hospitals and has been in our hospital several times.  At this point we will resume home meds.  Consult nephrologist.  Social service consult.  Palliative care consult for discussion about goals of care.  Called daughter Sanjuana ePña, unable to reach her at this point    DVT prophylaxis:  Mechanical DVT prophylaxis orders are present.        GI Prophylaxis:       Pepcid    CODE STATUS:    Code Status (Patient has no pulse and is not breathing): CPR  (Attempt to Resuscitate)  Medical Interventions (Patient has pulse or is breathing): Full Support    Admission Status:  I believe this patient meets observation status.             I have dictated this note utilizing Dragon Dictation.             Please note that portions of this note were completed with a voice recognition program.             Part of this note may be an electronic transcription/translation of spoken language to printed text         using the Dragon Dictation System.       Electronically signed by Rafael Lyons MD, 05/02/24, 9:38 AM EDT.    Total time spent with in evaluation and management:

## 2024-05-02 NOTE — CONSULTS
Purpose of the visit was to evaluate for: goals of care/advanced care planning. Spoke with RN and patient and discussed goals of care and resuscitation status.      Assessment:  Ms Rai has a long history including CKD with hemodialysis 3 times a week.  Resp. Issues as well.  Patient was brought to the ER post discharge from the hospital 2 days prior in which she ha had a lung infection.   Family request the patient be admitted as they cannot care for her at home. They have request placement in a nursing facility.   Ms Rai is alert and oriented when I visited She tells me she feels she is confused at times but cannot give any specifics. She is very sad and tearful. She wants to go home with her  of 67 years. She fears not being able to lay in the bed beside him. She has agreed to go to a rehab facility if needed but refuses to go to a nursing facility for LTC.   Spoke with CM. He has place referrals for rehab placement.   Patient has request to remain a full code with all interventions at this time.       Recommendations/Plan:  Further decisions to be made pending clinical course.  .    Tasks Completed: Code Status clarification and Emotional Support.    Palliative care will continue to follow and support.   Padmaja LLANES RN, BSN  Palliative Care  .

## 2024-05-02 NOTE — PLAN OF CARE
Goal Outcome Evaluation:   No complaints of chest pain this shift. Patient has remained alert and oriented x4 throughout the day. Patient currently sitting up in chair and visiting with family. No issues noted at this time.

## 2024-05-02 NOTE — ED PROVIDER NOTES
Time: 9:44 PM EDT  Date of encounter:  5/1/2024  Independent Historian/Clinical History and Information was obtained by:   Patient and Family  Chief Complaint: Confusion    History is limited by: N/A    History of Present Illness:  Patient is a 89 y.o. year old female who presents to the emergency department for evaluation of confusion.  Family notes that the patient was recently hospitalized for a lung infection.  She was here for a week.  She was on antibiotics while in the hospital and she was sent home on Levaquin.  The family notes during the patient's hospitalization she did have bronchoscopy with washings due to the pneumonia.  She was discharged home on Monday which is less than 2 days ago.  Thought she was doing well in the hospital.  However on the way home they noted that she was getting slightly confused.  They do note that it is intermittent.  They state it is not all the time.  Has had no fever or rigors nausea or vomiting.  The patient's had no diarrhea.  Patient does note that she is slightly short of breath.  The patient does have a loose cough this is chronic and unchanged.  She has no abdominal pain.  She is a dialysis patient.  She goes to dialysis Monday, Wednesday and Friday.  She did go today.  Denies any swelling in the legs.  She does note some generalized weakness.  The patient was started on new medications while in the hospital which include Imdur, Levaquin, losartan, prednisone and albuterol    HPI    Patient Care Team  Primary Care Provider: Rafael Lyons MD    Past Medical History:     No Known Allergies  Past Medical History:   Diagnosis Date    Allergic rhinitis     Anemia     Arthritis     Asthma     USE INHALERS AND NEBULIZERS    Back pain     Bladder disorder     Cancer     LEFT LUNG CANCER 2005 SURGERY AND CHEMO DONE  AND CURRENTLY 4/ 14/22  RIGHT LUNG CANCER HAS ONLY RECEIVED RADIATION THUS FAR    Chronic kidney disease     stage 3    CKD (chronic kidney disease) requiring  chronic dialysis     CKD (chronic kidney disease) stage 4, GFR 15-29 ml/min     Condition not found     ulcer    Congestive heart failure (CHF)     FOLLOWED BY DR AQUINO. DENIES CP BUT DOES HAVE SOA CHRONIC ISSUE COPD/LUNG CANCER    COPD (chronic obstructive pulmonary disease)     FOLLOWED BY DR MARIAJOSE BAILEY    Coronary artery disease     DENIES CP BUT DOES GET SOA MOST OF THE TIME WITH EXERTION BUT OCC AT REST CHRONIC ISSUE COPD/LUNG CANCER    Deep vein thrombosis     Diabetes mellitus     DOES NOT CHECK BS DAILY    Disease of thyroid gland     HYPOTHYROIDISM    Essential hypertension     Gastric ulcer     GERD (gastroesophageal reflux disease)     Heart murmur     History of transfusion     NO ISSUES POST TRANSFUSION WAS MANY YEARS AGO    Hyperlipemia     Leukocytopenia     FOLLOWED BY DR MIR BAILEY    Limb swelling     Lumbago     Lumbar spinal stenosis     Lung cancer     Migraine headache     Multiple joint pain     On home oxygen therapy     3L/NC PRN    Osteopenia     Reflux esophagitis     Shortness of breath     Thyroid nodule     HAS ULTRASOUND YEARLY BEING MONITORED    Vascular disease     Vitamin D deficiency      Past Surgical History:   Procedure Laterality Date    ABDOMINAL SURGERY      APPENDECTOMY      ARTERIOVENOUS FISTULA/SHUNT SURGERY Left 05/10/2022    Procedure: Left basilic vein transposition;  Surgeon: Wan Barksdale MD;  Location: [facility] MAIN OR;  Service: Vascular;  Laterality: Left;    ARTERIOVENOUS FISTULA/SHUNT SURGERY Left 3/23/2023    Procedure: Ligation of left arm arteriovenous fistula;  Surgeon: Wan Barksdale MD;  Location: [facility] MAIN OR;  Service: Vascular;  Laterality: Left;    ARTERIOVENOUS FISTULA/SHUNT SURGERY Left 11/30/2023    Procedure: Creation of left arm arteriovenous graft;  Surgeon: Wan Barksdale MD;  Location: [facility] MAIN OR;  Service: Vascular;  Laterality: Left;    BRONCHOSCOPY N/A 4/24/2024    Procedure: BRONCHOSCOPY WITH BAL AND WASHINGS;  Surgeon: Khalif Yanez  MD;  Location: [facility] ENDOSCOPY;  Service: Pulmonary;  Laterality: N/A;  MUCUS PLUGGING    CARDIAC CATHETERIZATION  1996    CARDIAC SURGERY      CARDIAC SURGERY      fluid drained from heart    CATARACT EXTRACTION, BILATERAL  2003    COLONOSCOPY  2014    ENDOSCOPY  2016 2019    FEMORAL ARTERY STENT Bilateral     HYSTERECTOMY      LUNG BIOPSY Left 2005    lobectomy upper lung caner    LUNG VOLUME REDUCTION      OTHER SURGICAL HISTORY      artifical joints/limbs    REPLACEMENT TOTAL KNEE Left 2016    UPPER GASTROINTESTINAL ENDOSCOPY       Family History   Problem Relation Age of Onset    Arthritis Mother     Arthritis Father     Cancer Brother     Diabetes Brother     Other Brother         blood disease     Prostate cancer Brother     Cancer Brother     Prostate cancer Brother     Cancer Brother     Cancer Brother     Malig Hyperthermia Neg Hx     Colon cancer Neg Hx        Home Medications:  Prior to Admission medications    Medication Sig Start Date End Date Taking? Authorizing Provider   losartan (Cozaar) 25 MG tablet Take 2 tablets by mouth Daily. 5/1/24  Yes Sarah Parker MD   albuterol sulfate  (90 Base) MCG/ACT inhaler Inhale 2 puffs Every 4 (Four) Hours As Needed for Wheezing.    ProviderSarah MD   budesonide-formoterol (SYMBICORT) 160-4.5 MCG/ACT inhaler Inhale 2 puffs 2 (Two) Times a Day.    ProviderSarah MD   carvedilol (Coreg) 25 MG tablet Take 1 tablet by mouth 2 (Two) Times a Day With Meals for 30 days. 11/5/23 4/24/24  Patrick Turcios MD   dexlansoprazole (DEXILANT) 60 MG capsule Take 1 capsule by mouth Daily. 9/23/22   ProviderSarah MD   ergocalciferol (ERGOCALCIFEROL) 1.25 MG (11072 UT) capsule Take 1 capsule by mouth Every 14 (Fourteen) Days. 3/4/22   Emergency, Nurse Emy, RN   hydrALAZINE (APRESOLINE) 25 MG tablet Take 1 tablet by mouth Every 12 (Twelve) Hours. 3/9/24   Emily Chandra MD   ipratropium-albuterol (DUO-NEB) 0.5-2.5 mg/3 ml nebulizer Take  3 mL by nebulization Every 4 (Four) Hours As Needed for Shortness of Air or Wheezing for up to 30 days. 4/29/24 5/29/24  Rafael Lyons MD   isosorbide dinitrate (ISORDIL) 20 MG tablet Take 1 tablet by mouth 3 (Three) Times a Day for 30 days. 4/29/24 5/29/24  Rafael Lyons MD   levocetirizine (XYZAL) 5 MG tablet TAKE 1 TABLET DAILY  Patient taking differently: Take 1 tablet by mouth Every Evening. 6/23/23   Paloma James MD   levoFLOXacin (Levaquin) 250 MG tablet Take 1 tablet by mouth Daily for 7 days. 4/29/24 5/6/24  Rafael Lyons MD   linagliptin (Tradjenta) 5 MG tablet tablet Take 1 tablet by mouth Daily. 1/10/22   Paloma James MD   nitroglycerin (NITROSTAT) 0.4 MG SL tablet Place 1 tablet under the tongue Every 5 (Five) Minutes As Needed for Chest Pain.    ProviderSarah MD   polycarbophil (calcium polycarbophil) 625 MG tablet tablet Take 1 tablet by mouth Daily As Needed.    Sarah Parker MD   predniSONE (DELTASONE) 10 MG tablet Take 1 tablet by mouth Daily for 5 days. 4/29/24 5/4/24  Rafael Lyons MD   rosuvastatin (CRESTOR) 20 MG tablet Take 1 tablet by mouth Daily. 7/7/23   León Sanchez MD   sevelamer (RENVELA) 800 MG tablet Take 1 tablet by mouth 3 (Three) Times a Day With Meals. 10/9/23   Sarah Parker MD   traMADol (ULTRAM) 50 MG tablet Take 1 tablet by mouth Every 8 (Eight) Hours As Needed for Moderate Pain for up to 30 days. 4/3/24 5/3/24  Rafael Lyons MD   Trulicity 1.5 MG/0.5ML solution pen-injector Inject 1.5 mg under the skin into the appropriate area as directed Every 14 (Fourteen) Days.    Sarah Parker MD   losartan (COZAAR) 25 MG tablet Take 2 tablets by mouth Daily for 30 days. 4/30/24 5/1/24  Rafael Lyons MD        Social History:   Social History     Tobacco Use    Smoking status: Former     Current packs/day: 0.00     Average packs/day: 1 pack/day for 52.5 years (52.5 ttl pk-yrs)     Types: Cigarettes     Start date: 1952     Quit date:  "2004     Years since quittin.8     Passive exposure: Past    Smokeless tobacco: Never   Vaping Use    Vaping status: Never Used   Substance Use Topics    Alcohol use: Never    Drug use: Never         Review of Systems:  Review of Systems   Constitutional:  Positive for fatigue. Negative for chills, diaphoresis and fever.   HENT:  Negative for congestion, postnasal drip, rhinorrhea and sore throat.    Eyes:  Negative for photophobia.   Respiratory:  Positive for cough and shortness of breath. Negative for chest tightness.    Cardiovascular:  Negative for chest pain, palpitations and leg swelling.   Gastrointestinal:  Negative for abdominal pain, diarrhea, nausea and vomiting.   Genitourinary:  Negative for difficulty urinating, dysuria, flank pain, frequency, hematuria and urgency.   Musculoskeletal:  Negative for neck pain and neck stiffness.   Skin:  Negative for pallor and rash.   Neurological:  Positive for weakness. Negative for dizziness, syncope, numbness and headaches.   Hematological:  Negative for adenopathy. Does not bruise/bleed easily.   Psychiatric/Behavioral:  Positive for confusion.         Physical Exam:  /57 (BP Location: Right arm, Patient Position: Lying)   Pulse 86   Temp 98.1 °F (36.7 °C) (Oral)   Resp 18   Ht 165.1 cm (65\")   Wt 64.7 kg (142 lb 10.2 oz)   LMP  (LMP Unknown)   SpO2 94%   BMI 23.74 kg/m²     Physical Exam  Vitals and nursing note reviewed.   Constitutional:       General: She is not in acute distress.     Appearance: Normal appearance. She is not ill-appearing, toxic-appearing or diaphoretic.   HENT:      Head: Normocephalic and atraumatic.      Mouth/Throat:      Mouth: Mucous membranes are moist.   Eyes:      Pupils: Pupils are equal, round, and reactive to light.   Cardiovascular:      Rate and Rhythm: Normal rate and regular rhythm.      Pulses: Normal pulses.           Carotid pulses are 2+ on the right side and 2+ on the left side.       Radial " pulses are 2+ on the right side and 2+ on the left side.        Femoral pulses are 2+ on the right side and 2+ on the left side.       Popliteal pulses are 2+ on the right side and 2+ on the left side.        Dorsalis pedis pulses are 2+ on the right side and 2+ on the left side.        Posterior tibial pulses are 2+ on the right side and 2+ on the left side.      Heart sounds: Normal heart sounds. No murmur heard.  Pulmonary:      Effort: Pulmonary effort is normal. No accessory muscle usage, respiratory distress or retractions.      Breath sounds: Examination of the right-upper field reveals decreased breath sounds. Examination of the right-middle field reveals decreased breath sounds. Examination of the left-middle field reveals rhonchi. Examination of the right-lower field reveals decreased breath sounds. Examination of the left-lower field reveals decreased breath sounds. Decreased breath sounds and rhonchi present. No wheezing or rales.   Abdominal:      General: Abdomen is flat. There is no distension.      Palpations: Abdomen is soft. There is no mass or pulsatile mass.      Tenderness: There is no abdominal tenderness. There is no right CVA tenderness, left CVA tenderness, guarding or rebound.      Comments: No rigidity   Musculoskeletal:         General: No swelling, tenderness or deformity.      Cervical back: Neck supple. No tenderness. No spinous process tenderness or muscular tenderness. Normal range of motion.      Right lower leg: No edema.      Left lower leg: No edema.   Skin:     General: Skin is warm and dry.      Capillary Refill: Capillary refill takes less than 2 seconds.      Coloration: Skin is not jaundiced or pale.      Findings: No erythema.   Neurological:      General: No focal deficit present.      Mental Status: She is alert and oriented to person, place, and time. Mental status is at baseline.      Cranial Nerves: Cranial nerves 2-12 are intact. No cranial nerve deficit.       Sensory: Sensation is intact. No sensory deficit.      Motor: Motor function is intact. No weakness or pronator drift.      Coordination: Coordination is intact. Coordination normal.   Psychiatric:         Mood and Affect: Mood normal.         Behavior: Behavior normal.                  Procedures:  Procedures      Medical Decision Making:      Comorbidities that affect care:    GERD, diabetes, anemia, chronic renal failure, COPD, hypertension, hyperlipidemia, chronic back pain    External Notes reviewed:    None      The following orders were placed and all results were independently analyzed by me:  Orders Placed This Encounter   Procedures    Blood Culture - Blood,    Blood Culture - Blood,    XR Chest 1 View    XR Spine Thoracic 3 View    Crumrod Draw    Comprehensive Metabolic Panel    Single High Sensitivity Troponin T    Magnesium    Urinalysis With Microscopic If Indicated (No Culture) - Urine, Clean Catch    CBC Auto Differential    Lactic Acid, Plasma    High Sensitivity Troponin T 2Hr    Urinalysis, Microscopic Only - Urine, Clean Catch    T4, Free    TSH    Urine Drug Screen - Urine, Clean Catch    Ammonia    Diet: Cardiac, Diabetic, Renal; Healthy Heart (2-3 Na+); Consistent Carbohydrate; Low Potassium, Low Phosphorus; Fluid Consistency: Thin (IDDSI 0)    Undress & Gown    Continuous Pulse Oximetry    Vital Signs    Orthostatic Blood Pressure    Vital Signs    Up With Assistance    Intake & Output    Weigh Patient    Oral Care    Saline Lock & Maintain IV Access    Place Sequential Compression Device    Maintain Sequential Compression Device    Code Status and Medical Interventions:    General MD Inpatient Consult    Inpatient Case Management  Consult    Inpatient Palliative Care Team Consult    Inpatient Pulmonology Consult    Inpatient Nephrology Consult    OT Consult: Eval & Treat ADL Performance Below Baseline    PT Consult: Eval & Treat Functional Mobility Below Baseline    Oxygen  Therapy- Nasal Cannula; Titrate 1-6 LPM Per SpO2; 90 - 95%    Oxygen Therapy- Nasal Cannula; Titrate 1-6 LPM Per SpO2; 90 - 95%    POC Glucose Once    ECG 12 Lead ED Triage Standing Order; Weak / Dizzy / AMS    Insert Peripheral IV    Insert Peripheral IV    Initiate Observation Status    Fall Precautions    CBC & Differential    Green Top (Gel)    Lavender Top    Gold Top - SST    Light Blue Top       Medications Given in the Emergency Department:  Medications   sodium chloride 0.9 % flush 10 mL (10 mL Intravenous Given 5/2/24 0139)   carvedilol (COREG) tablet 25 mg (25 mg Oral Not Given 5/2/24 1747)   hydrALAZINE (APRESOLINE) tablet 25 mg (25 mg Oral Not Given 5/2/24 2010)   ipratropium-albuterol (DUO-NEB) nebulizer solution 3 mL (has no administration in time range)   isosorbide dinitrate (ISORDIL) tablet 20 mg (20 mg Oral Not Given 5/2/24 1802)   losartan (COZAAR) tablet 50 mg (50 mg Oral Given 5/2/24 1012)   traMADol (ULTRAM) tablet 50 mg (has no administration in time range)   sodium chloride 0.9 % flush 10 mL (10 mL Intravenous Given 5/2/24 2006)   sodium chloride 0.9 % flush 10 mL (has no administration in time range)   sodium chloride 0.9 % infusion 40 mL (has no administration in time range)   acetaminophen (TYLENOL) tablet 650 mg (has no administration in time range)   HYDROmorphone (DILAUDID) injection 0.5 mg (has no administration in time range)     And   naloxone (NARCAN) injection 0.4 mg (has no administration in time range)   aluminum-magnesium hydroxide-simethicone (MAALOX MAX) 400-400-40 MG/5ML suspension 15 mL (has no administration in time range)   famotidine (PEPCID) tablet 40 mg (40 mg Oral Given 5/2/24 1012)   ALPRAZolam (XANAX) tablet 0.25 mg (has no administration in time range)   ondansetron (ZOFRAN) injection 4 mg (has no administration in time range)   temazepam (RESTORIL) capsule 15 mg (has no administration in time range)   sennosides-docusate (PERICOLACE) 8.6-50 MG per tablet 2 tablet  (2 tablets Oral Given 5/2/24 2006)     And   polyethylene glycol (MIRALAX) packet 17 g (has no administration in time range)     And   bisacodyl (DULCOLAX) EC tablet 5 mg (has no administration in time range)     And   bisacodyl (DULCOLAX) suppository 10 mg (has no administration in time range)   arformoterol (BROVANA) nebulizer solution 15 mcg (15 mcg Nebulization Given 5/2/24 1858)   budesonide (PULMICORT) nebulizer solution 0.5 mg (0.5 mg Nebulization Given 5/2/24 1858)   revefenacin (YUPELRI) nebulizer solution 175 mcg (175 mcg Nebulization Not Given 5/2/24 1719)   predniSONE (DELTASONE) tablet 40 mg (has no administration in time range)   tobramycin PF (JUAN) nebulizer solution 300 mg (300 mg Nebulization Given 5/2/24 1900)   Pharmacy to Dose Cefepime (has no administration in time range)   cefepime (MAXIPIME) 2000 mg/100 mL 0.9% NS (mbp) (has no administration in time range)   acetaminophen (TYLENOL) tablet 650 mg (650 mg Oral Given 5/1/24 2215)   ipratropium-albuterol (DUO-NEB) nebulizer solution 3 mL (3 mL Nebulization Given 5/1/24 2203)   ketorolac (TORADOL) injection 15 mg (15 mg Intravenous Given 5/2/24 0138)   ipratropium-albuterol (DUO-NEB) nebulizer solution 3 mL (3 mL Nebulization Given 5/2/24 0047)   cefepime (MAXIPIME) 2000 mg/100 mL 0.9% NS (mbp) (2,000 mg Intravenous New Bag 5/2/24 1749)        ED Course:    ED Course as of 05/03/24 0635   Wed May 01, 2024   1920 EKG:    Rhythm: Sinus rhythm  Rate: 95  Intervals: Normal ND and QT interval    T-wave: Isolated nonspecific T wave inversion in aVL  ST Segment: Nonspecific ST segment V1, V2, V3, nonspecific ST depression in V6, no obvious pathological ST elevation and reciprocal ST depression to suggest STEMI    EKG Comparison: No change in the QRS and ST morphology from EKG performed April 21, 2024    Interpreted by me   [SD]      ED Course User Index  [SD] Justus Delvalle DO       Labs:    Lab Results (last 24 hours)       ** No results found for the  last 24 hours. **             Imaging:    No Radiology Exams Resulted Within Past 24 Hours      Differential Diagnosis and Discussion:    Altered Mental Status: Based on the patient's signs and symptoms, differential diagnosis includes but is not limited to meningitis, stroke, sepsis, subarachnoid hemorrhage, intracranial bleeding, encephalitis, and metabolic encephalopathy.    All labs were reviewed and interpreted by me.  All X-rays impressions were independently interpreted by me.  EKG was interpreted by me.    MDM  Number of Diagnoses or Management Options  Chronic anemia  Chronic kidney disease, unspecified CKD stage  Chronic midline thoracic back pain  Chronic obstructive pulmonary disease, unspecified COPD type  Confusion  Weakness  Diagnosis management comments: Sepsis was not present in the emergency department or on arrival. This is supported as the patient does not either meet two out of the four SIRS criteria or has an obvious bacterial infection.      SIRS criteria considered:   1.                     Temperature > 100.4 or <98.6    2.                     Heart Rate > 90    3.                     Respiratory Rate > 22    4.                     WBC > 12K or <4K.    Urine toxicology was negative    The patient's TSH and free T4 were normal.  I do not feel that the patient had thyrotoxicosis or thyroid storm and thus not the cause of the patient's symptoms    2 blood cultures were ordered.  The results are pending at the time of disposition    The patient's CMP was reviewed and shows no abnormalities of critical concern.  Of note, the patient's sodium and potassium are acceptable.  The patient's liver enzymes are unremarkable.  The patient's renal function including creatinine is preserved.  The patient has a normal anion gap.    The patient's CMP was reviewed and shows no abnormalities of critical concern.  Of note, the patient's sodium and potassium are acceptable.  The patient's liver enzymes are  unremarkable.  The patient's renal function including creatinine is preserved.  The patient has a normal anion gap.    Patient's urinalysis was contaminated with skin cells.  I do not suspect UTI    The patient's ammonia level was 16    Patient's initial troponin was 67.  It was repeated 2 hours later and returned at 68.  This is a delta of 1 which is considered clinically insignificant..  It is unlikely that the patient had an acute myocardial infarction    The patient was given 3 DuoNebs in the emergency room.  The patient was given Tylenol and Toradol for her back pain.    Chest x-ray appears stable with chronic appearing changes in the left hemithorax as well as the right midlung field    xray of the thoracic spine demonstrates no acute bony fracture.    The patient's initial presentation was for intermittent confusion.  The patient did not appear to be confused in the emergency room.  Upon disposition, the patient and family then stated there were more concerned about the patient's breathing and back pain which was not there initial primary concern.  They feel that the patient should not have been discharged from the hospital and they do not feel comfortable taking the patient home.  The patient is requesting admission for further workup and evaluation of the back pain, cough and shortness of breath           Amount and/or Complexity of Data Reviewed  Clinical lab tests: reviewed  Tests in the radiology section of CPT®: reviewed  Tests in the medicine section of CPT®: reviewed  Decide to obtain previous medical records or to obtain history from someone other than the patient: yes  Discuss the patient with other providers: yes (02:18 EDT  I discussed the case with Dr. Lyons.  We have discussed the patient's presenting symptoms, laboratory values, imaging and condition at the time of admission.  He agrees with the current assessment and plan.  He will admit the patient to the hospital)             Social  Determinants of Health:    Patient is independent, reliable, and has access to care.       Disposition and Care Coordination:    Admit:   Through independent evaluation of the patient's history, physical, and imperical data, the patient meets criteria for inpatient admission to the hospital.        Final diagnoses:   Confusion   Chronic kidney disease, unspecified CKD stage   Chronic anemia   Chronic midline thoracic back pain   Chronic obstructive pulmonary disease, unspecified COPD type   Weakness        ED Disposition       ED Disposition   Decision to Admit    Condition   --    Comment   Level of Care: Med/Surg [1]   Diagnosis: Confusion [047000]   Admitting Physician: TAWANNA MORENO [[id number]]   Attending Physician: TAWANNA MORENO [[id number]]                 This medical record created using voice recognition software.             Justus Delvalle DO  05/03/24 0636

## 2024-05-02 NOTE — CONSULTS
Pulmonary / Critical Care Consult Note      Patient Name: Charlette Rai  : 1935  MRN: [id number]  Primary Care Physician:  Rafael Lyons MD  Referring Physician: No ref. provider found  Date of admission: 2024    Subjective   Subjective     Reason for Consult/ Chief Complaint: Chest discomfort, recent history of Pseudomonas pneumonia, COPD, CHF, and end-stage renal disease on hemodialysis    HPI:  Charlette Rai is a 89 y.o. female  with recent hospitalization for Pseudomonas pneumonia, volume overload with end-stage renal disease on dialysis.  She was discharged home on 2024 with DuoNeb nebulizer, levofloxacin and prednisone.  She started having chest discomfort yesterday and was admitted to hospital with persistent weakness, chest discomfort.  Pulmonary critical services consulted for assistance with management of her acute issues. She has been getting regular dialysis and had not missed appointments since last hospitalization.  She has been having cough and congestion. Her previous echocardiogram showed EF of 56% with grade 1 diastolic dysfunction. She has history of smoking in past, quit smoking in .  Prior to that she used to smoke 1 pack a day for more than 50 years.  She is on Symbicort 2 puffs twice daily at home, as needed albuterol inhaler and nebulizer.  She was using albuterol nebulizer several times a day with mild improvement in her breathing over the last several days.  Has no fever or chills.  No nausea or vomiting.  No significant changes in weight or appetite.  No significant leg swelling.  Dialysis does help at times.     Review of Systems  General:  Fatigue, No Fever.  HEENT: No dysphagia, No Visual Changes, no rhinorrhea  Respiratory:  + Productive cough,+Dyspnea, mucoid greenish-yellow phlegm, No Pleuritic Pain, + wheezing, no hemoptysis  Cardiovascular: Denies chest pain, denies palpitations,+HAIDER, No Chest Pressure  Gastrointestinal:  No Abdominal Pain, No Nausea, No  Vomiting, No Diarrhea  Genitourinary:  No Dysuria, No Frequency, No Hesitancy  Musculoskeletal: No muscle pain or swelling  Endocrine:  No Heat Intolerance, No Cold Intolerance  Hematologic:  No Bleeding, No Bruising  Psychiatric:  No Anxiety, No Depression  Neurologic:  No Confusion, no Dysarthria, No Headaches  Skin:  No Rash, No Open Wounds        Personal History     Past Medical History:   Diagnosis Date    Allergic rhinitis     Anemia     Arthritis     Asthma     USE INHALERS AND NEBULIZERS    Back pain     Bladder disorder     Cancer     LEFT LUNG CANCER 2005 SURGERY AND CHEMO DONE  AND CURRENTLY 4/ 14/22  RIGHT LUNG CANCER HAS ONLY RECEIVED RADIATION THUS FAR    Chronic kidney disease     stage 3    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 DENIES CP BUT DOES HAVE SOA CHRONIC ISSUE COPD/LUNG CANCER    COPD (chronic obstructive pulmonary disease)     FOLLOWED BY DR MARIAJOSE BAILEY    Coronary artery disease     DENIES CP BUT DOES GET SOA MOST OF THE TIME WITH EXERTION BUT OCC AT REST CHRONIC ISSUE COPD/LUNG CANCER    Deep vein thrombosis     Diabetes mellitus     DOES NOT CHECK BS DAILY    Disease of thyroid gland     HYPOTHYROIDISM    Essential hypertension     Gastric ulcer     GERD (gastroesophageal reflux disease)     Heart murmur     History of transfusion     NO ISSUES POST TRANSFUSION WAS MANY YEARS AGO    Hyperlipemia     Leukocytopenia     FOLLOWED BY DR MIR BAILEY    Limb swelling     Lumbago     Lumbar spinal stenosis     Lung cancer     Migraine headache     Multiple joint pain     On home oxygen therapy     3L/NC PRN    Osteopenia     Reflux esophagitis     Shortness of breath     Thyroid nodule     HAS ULTRASOUND YEARLY BEING MONITORED    Vascular disease     Vitamin D deficiency        Past Surgical History:   Procedure Laterality Date     ABDOMINAL SURGERY      APPENDECTOMY      ARTERIOVENOUS FISTULA/SHUNT SURGERY Left 05/10/2022    Procedure: Left basilic vein transposition;  Surgeon: Wan Barksdale MD;  Location: [facility] MAIN OR;  Service: Vascular;  Laterality: Left;    ARTERIOVENOUS FISTULA/SHUNT SURGERY Left 3/23/2023    Procedure: Ligation of left arm arteriovenous fistula;  Surgeon: Wan Barksdale MD;  Location: [facility] MAIN OR;  Service: Vascular;  Laterality: Left;    ARTERIOVENOUS FISTULA/SHUNT SURGERY Left 11/30/2023    Procedure: Creation of left arm arteriovenous graft;  Surgeon: Wan Barksdale MD;  Location: [facility] MAIN OR;  Service: Vascular;  Laterality: Left;    BRONCHOSCOPY N/A 4/24/2024    Procedure: BRONCHOSCOPY WITH BAL AND WASHINGS;  Surgeon: Khalif Yanez MD;  Location: [facility] ENDOSCOPY;  Service: Pulmonary;  Laterality: N/A;  MUCUS PLUGGING    CARDIAC CATHETERIZATION  1996    CARDIAC SURGERY      CARDIAC SURGERY      fluid drained from heart    CATARACT EXTRACTION, BILATERAL  2003    COLONOSCOPY  2014    ENDOSCOPY  2016 2019    FEMORAL ARTERY STENT Bilateral     HYSTERECTOMY      LUNG BIOPSY Left 2005    lobectomy upper lung caner    LUNG VOLUME REDUCTION      OTHER SURGICAL HISTORY      artifical joints/limbs    REPLACEMENT TOTAL KNEE Left 2016    UPPER GASTROINTESTINAL ENDOSCOPY         Family History: family history includes Arthritis in her father and mother; Cancer in her brother, brother, brother, and brother; Diabetes in her brother; Other in her brother; Prostate cancer in her brother and brother.     Social History:  reports that she quit smoking about 19 years ago. Her smoking use included cigarettes. She started smoking about 72 years ago. She has a 52.5 pack-year smoking history. She has been exposed to tobacco smoke. She has never used smokeless tobacco. She reports that she does not drink alcohol and does not use drugs.    Home Medications:  Dulaglutide, albuterol sulfate HFA, budesonide-formoterol,  carvedilol, dexlansoprazole, ergocalciferol, hydrALAZINE, ipratropium-albuterol, isosorbide dinitrate, levoFLOXacin, levocetirizine, linagliptin, losartan, nitroglycerin, polycarbophil, predniSONE, rosuvastatin, sevelamer, and traMADol    Allergies:  No Known Allergies    Objective    Objective     Vitals:   Temp:  [98.2 °F (36.8 °C)-98.9 °F (37.2 °C)] 98.2 °F (36.8 °C)  Heart Rate:  [76-97] 78  Resp:  [14-28] 18  BP: (113-177)/(40-63) 153/51  Flow (L/min):  [2] 2    Physical Exam:  Vital Signs Reviewed   General:  WDWN, Alert, in mild distress, has conversational dyspnea.    HEENT:  PERRL, EOMI.  OP, nares clear, no sinus tenderness  Neck:  Supple, no JVD, no thyromegaly  Lymph: no axillary, cervical, supraclavicular lymphadenopathy noted bilaterally  Chest: Poor aeration, bilateral diminished breath sounds, scattered rhonchi, no wheezing, minimal basal crackles  CV: RRR, no MGR, pulses 2+, equal.  Abd:  Soft, NT, ND, + BS, no HSM  EXT:  no clubbing, no cyanosis, no edema, no joint tenderness  Neuro:  A&Ox3, CN grossly intact, no focal deficits.  Skin: No rashes or lesions noted      Result Review    Result Review:  I have personally reviewed the results from the time of this admission to 5/2/2024 15:36 EDT and agree with these findings:  [x]  Laboratory  [x]  Microbiology  [x]  Radiology  [x]  EKG/Telemetry   [x]  Cardiology/Vascular   []  Pathology  []  Old records  []  Other:  Most notable findings include:         Lab 05/01/24  1949 04/29/24  0533 04/28/24  0605 04/26/24  0553   WBC 7.11 6.80 6.34  --    HEMOGLOBIN 8.5* 8.4* 8.2*  --    HEMATOCRIT 28.1* 28.0* 28.4*  --    PLATELETS 123* 132* 153  --    SODIUM 136 137 139 136   POTASSIUM 3.9 3.9 4.1 4.6   CHLORIDE 102 106 105 104   CO2 24.6 21.0* 24.9 20.6*   BUN 10 41* 34* 33*   CREATININE 1.73* 3.51* 3.26* 3.04*   GLUCOSE 228* 81 83 119*   CALCIUM 8.1* 8.1* 8.3* 7.9*   TOTAL PROTEIN 5.2*  --   --   --    ALBUMIN 3.3*  --   --   --    GLOBULIN 1.9  --   --   --       XR Chest 1 View    Result Date: 5/1/2024  XR CHEST 1 VW-  Date of Exam: 5/1/2024 7:53 PM  Indication: Weak/Dizzy/AMS triage protocol  Comparison: 4/21/2024  Findings: There is stable postoperative changes in the left hemithorax. There is associated consolidation and volume loss in the left hemithorax. Stable consolidation in the right midlung field. Stable right-sided central venous catheter with the tip in the region of the distal SVC. Stable blunting of the left costophrenic angle. Dense vascular calcification in the thoracic aorta. No evidence of pneumothorax.      Impression: Impression: Stable exam. Stable chronic appearing changes in the left hemithorax and right midlung field.   Electronically Signed By-YARELIS LUCAS MD On:5/1/2024 8:29 PM         CT Chest Without Contrast Diagnostic    Result Date: 4/22/2024  CT CHEST WO CONTRAST DIAGNOSTIC-  Date of Exam: 4/22/2024 7:49 PM  Indication: resp. failure; J44.1-h/o chronic obstructive pulmonary disease with (acute) exacerbation; SOA/SOB/shortness of air/shortness of breath  Comparisons: 4/21/2024; 3/13/2024.  Technique: Axial CT images were obtained of the chest without contrast administration.  Reconstructed 2D coronal and sagittal images were also obtained. Automated exposure control and iterative construction methods were used.  Findings: There has been interval placement of a tunneled dialysis catheter (TDC) from what is thought to represent a right internal jugular (IJ) vein approach (since 3/13/2024) with its distal-most tip in the right atrium. Again, extensive postoperative changes of the left thorax are noted, seen previously, with suspected prior left upper lobectomy and chronic pleural-parenchymal scarring and volume loss. Calcified pleural plaques are seen. There may be round atelectasis and/or pleural-parenchymal scarring, as well, in the remaining inferior left lung. There is chronic leftward mediastinal shift. Pleural-parenchymal scarring is  seen in the hyperexpanded right lung, especially in the posterior right upper lobe. These findings are similar to that seen previously (on 3/13/2024). There is a new air-fluid level in the right mainstem bronchus, which is nonspecific. It may represent mucus or other retained secretion. Aspirated content cannot be excluded. No definite acute infiltrate is seen in the right lower lobe. There are stable subpleural opacities. No pneumothorax is seen. No pneumomediastinum. The small right pleural effusion has resolved. Minimal if any pleural effusion is appreciated currently. Probably no cardiac enlargement is present. No aneurysmal dilatation of the thoracic aorta. Interval evolution of the suspected right sided perinephric/subcapsular renal hematoma, probably accounting for the asymmetry in size of the partially imaged bilateral kidneys. Extensive atherosclerotic changes are seen, including involvement of the coronary arteries, thoracic aorta, abdominal aorta and its branches, and the great arteries. No thyroid enlargement. No enlarged axillary or supraclavicular lymph nodes. There are small mediastinal lymph nodes. Probably no enlarged mediastinal lymph nodes are seen.      Impression:  Interval placement of a right IJ tunneled dialysis catheter (TDC) is noted since 3/13/2024, as discussed.  There has been interval decrease in (if not complete resolution of) the small right pleural effusion since 3/13/2024.  There is an air-fluid level in the right mainstem bronchus. It may represent mucus. Aspirated content cannot be excluded.  No definite acute infiltrate.  Otherwise, no significant interval change is appreciated since the prior exam from 3/13/2024.    Electronically Signed By-Chicho Jones MD On:4/22/2024 9:33 PM       Results for orders placed during the hospital encounter of 10/29/23    Adult Transthoracic Echo Complete W/ Cont if Necessary Per Protocol    Interpretation Summary    Left ventricular systolic  function is normal. Calculated left ventricular EF = 56.8%    Left ventricular wall thickness is consistent with mild asymmetric hypertrophy.    Left ventricular diastolic function is consistent with (grade I) impaired relaxation.    Left atrial volume is moderately increased.    Moderate aortic valve stenosis is present.    Estimated right ventricular systolic pressure from tricuspid regurgitation is normal (<35 mmHg).    Mild dilation of the aortic root is present.        Assessment & Plan   Assessment / Plan     Active Hospital Problems:  Active Hospital Problems    Diagnosis     **Confusion     History of recent pneumonia     ESRD (end stage renal disease)     Anemia of chronic disease          Impression:  Pseudomonas pneumonia, incompletely treated  Chest discomfort with likely bronchitis  Diastolic heart failure with acute exacerbation  COPD with acute exacerbation  Pseudomonas pneumonia  End-stage renal disease on dialysis  History of smoking in past  Persistent cough  Viral bronchitis with human metapneumovirus  Therapeutic drug monitoring of antibiotics    Plan:  Switch Levaquin to IV cefepime.  Start JUAN neb twice daily.  Start Brovana, Pulmicort and Yupelri.  Start chest vest.  Recommend short-term rehab.  Dialysis per nephrology service.  Supplemental oxygen to keep saturation more than 90%.  I had extensive discussion with herself and her family regarding poor lung function, cardiac and renal issues.  She would benefit from short-term rehab.    I have reviewed labs, imaging, pertinent clinical data and provider notes.   I have discussed with bedside nurse and primary service.       Electronically signed by Khalif Yanez MD, 5/2/2024, 15:36 EDT.

## 2024-05-02 NOTE — PLAN OF CARE
Problem: Adult Inpatient Plan of Care  Goal: Plan of Care Review  Outcome: Ongoing, Progressing  Flowsheets (Taken 5/2/2024 0544)  Plan of Care Reviewed With: patient  Goal: Patient-Specific Goal (Individualized)  Outcome: Ongoing, Progressing  Goal: Absence of Hospital-Acquired Illness or Injury  Outcome: Ongoing, Progressing  Intervention: Identify and Manage Fall Risk  Recent Flowsheet Documentation  Taken 5/2/2024 0417 by Wendi Morgan RN  Safety Promotion/Fall Prevention:   safety round/check completed   room organization consistent  Intervention: Prevent and Manage VTE (Venous Thromboembolism) Risk  Recent Flowsheet Documentation  Taken 5/2/2024 0413 by Wendi Morgan RN  VTE Prevention/Management: patient refused intervention  Goal: Optimal Comfort and Wellbeing  Outcome: Ongoing, Progressing  Intervention: Monitor Pain and Promote Comfort  Recent Flowsheet Documentation  Taken 5/2/2024 0417 by Wendi Morgan RN  Pain Management Interventions: position adjusted  Goal: Readiness for Transition of Care  Outcome: Ongoing, Progressing  Intervention: Mutually Develop Transition Plan  Recent Flowsheet Documentation  Taken 5/2/2024 0418 by Wendi Morgan RN  Transportation Anticipated: family or friend will provide  Patient/Family Anticipated Services at Transition: none  Patient/Family Anticipates Transition to: home  Taken 5/2/2024 0415 by Wendi Morgan, RN  Equipment Currently Used at Home:   cane, straight   wheelchair   walker, rolling     Problem: Skin Injury Risk Increased  Goal: Skin Health and Integrity  Outcome: Ongoing, Progressing     Problem: Adjustment to Illness (Sepsis/Septic Shock)  Goal: Optimal Coping  Outcome: Ongoing, Progressing     Problem: Bleeding (Sepsis/Septic Shock)  Goal: Absence of Bleeding  Outcome: Ongoing, Progressing     Problem: Glycemic Control Impaired (Sepsis/Septic Shock)  Goal: Blood Glucose Level Within Desired Range  Outcome: Ongoing, Progressing     Problem: Infection  Progression (Sepsis/Septic Shock)  Goal: Absence of Infection Signs and Symptoms  Outcome: Ongoing, Progressing     Problem: Nutrition Impaired (Sepsis/Septic Shock)  Goal: Optimal Nutrition Intake  Outcome: Ongoing, Progressing   Goal Outcome Evaluation:  Plan of Care Reviewed With: patient

## 2024-05-03 LAB
ANION GAP SERPL CALCULATED.3IONS-SCNC: 9 MMOL/L (ref 5–15)
BASOPHILS # BLD AUTO: 0.02 10*3/MM3 (ref 0–0.2)
BASOPHILS NFR BLD AUTO: 0.3 % (ref 0–1.5)
BUN SERPL-MCNC: 23 MG/DL (ref 8–23)
BUN/CREAT SERPL: 7.2 (ref 7–25)
CALCIUM SPEC-SCNC: 8.1 MG/DL (ref 8.6–10.5)
CHLORIDE SERPL-SCNC: 100 MMOL/L (ref 98–107)
CO2 SERPL-SCNC: 25 MMOL/L (ref 22–29)
CREAT SERPL-MCNC: 3.21 MG/DL (ref 0.57–1)
DEPRECATED RDW RBC AUTO: 63 FL (ref 37–54)
EGFRCR SERPLBLD CKD-EPI 2021: 13.5 ML/MIN/1.73
EOSINOPHIL # BLD AUTO: 0.08 10*3/MM3 (ref 0–0.4)
EOSINOPHIL NFR BLD AUTO: 1.1 % (ref 0.3–6.2)
ERYTHROCYTE [DISTWIDTH] IN BLOOD BY AUTOMATED COUNT: 17 % (ref 12.3–15.4)
GLUCOSE SERPL-MCNC: 141 MG/DL (ref 65–99)
HCT VFR BLD AUTO: 29.8 % (ref 34–46.6)
HGB BLD-MCNC: 8.9 G/DL (ref 12–15.9)
IMM GRANULOCYTES # BLD AUTO: 0.05 10*3/MM3 (ref 0–0.05)
IMM GRANULOCYTES NFR BLD AUTO: 0.7 % (ref 0–0.5)
LYMPHOCYTES # BLD AUTO: 0.35 10*3/MM3 (ref 0.7–3.1)
LYMPHOCYTES NFR BLD AUTO: 4.9 % (ref 19.6–45.3)
MCH RBC QN AUTO: 30.5 PG (ref 26.6–33)
MCHC RBC AUTO-ENTMCNC: 29.9 G/DL (ref 31.5–35.7)
MCV RBC AUTO: 102.1 FL (ref 79–97)
MONOCYTES # BLD AUTO: 0.4 10*3/MM3 (ref 0.1–0.9)
MONOCYTES NFR BLD AUTO: 5.6 % (ref 5–12)
NEUTROPHILS NFR BLD AUTO: 6.24 10*3/MM3 (ref 1.7–7)
NEUTROPHILS NFR BLD AUTO: 87.4 % (ref 42.7–76)
NRBC BLD AUTO-RTO: 0 /100 WBC (ref 0–0.2)
PLATELET # BLD AUTO: 127 10*3/MM3 (ref 140–450)
PMV BLD AUTO: 10 FL (ref 6–12)
POTASSIUM SERPL-SCNC: 4.3 MMOL/L (ref 3.5–5.2)
RBC # BLD AUTO: 2.92 10*6/MM3 (ref 3.77–5.28)
SODIUM SERPL-SCNC: 134 MMOL/L (ref 136–145)
WBC NRBC COR # BLD AUTO: 7.14 10*3/MM3 (ref 3.4–10.8)

## 2024-05-03 PROCEDURE — 25010000002 HEPARIN (PORCINE) PER 1000 UNITS: Performed by: INTERNAL MEDICINE

## 2024-05-03 PROCEDURE — 99233 SBSQ HOSP IP/OBS HIGH 50: CPT | Performed by: INTERNAL MEDICINE

## 2024-05-03 PROCEDURE — 97161 PT EVAL LOW COMPLEX 20 MIN: CPT

## 2024-05-03 PROCEDURE — 94799 UNLISTED PULMONARY SVC/PX: CPT

## 2024-05-03 PROCEDURE — 85025 COMPLETE CBC W/AUTO DIFF WBC: CPT | Performed by: INTERNAL MEDICINE

## 2024-05-03 PROCEDURE — G0378 HOSPITAL OBSERVATION PER HR: HCPCS

## 2024-05-03 PROCEDURE — 5A1D70Z PERFORMANCE OF URINARY FILTRATION, INTERMITTENT, LESS THAN 6 HOURS PER DAY: ICD-10-PCS | Performed by: INTERNAL MEDICINE

## 2024-05-03 PROCEDURE — 25010000002 HYDROMORPHONE 1 MG/ML SOLUTION: Performed by: INTERNAL MEDICINE

## 2024-05-03 PROCEDURE — 97165 OT EVAL LOW COMPLEX 30 MIN: CPT

## 2024-05-03 PROCEDURE — 80048 BASIC METABOLIC PNL TOTAL CA: CPT | Performed by: INTERNAL MEDICINE

## 2024-05-03 PROCEDURE — 25010000002 CEFEPIME PER 500 MG: Performed by: INTERNAL MEDICINE

## 2024-05-03 PROCEDURE — 63710000001 PREDNISONE PER 1 MG: Performed by: INTERNAL MEDICINE

## 2024-05-03 RX ORDER — HEPARIN SODIUM 1000 [USP'U]/ML
2400 INJECTION, SOLUTION INTRAVENOUS; SUBCUTANEOUS AS NEEDED
Status: DISCONTINUED | OUTPATIENT
Start: 2024-05-03 | End: 2024-05-07 | Stop reason: HOSPADM

## 2024-05-03 RX ADMIN — HYDRALAZINE HYDROCHLORIDE 25 MG: 25 TABLET ORAL at 22:34

## 2024-05-03 RX ADMIN — BUDESONIDE 0.5 MG: 0.5 SUSPENSION RESPIRATORY (INHALATION) at 18:28

## 2024-05-03 RX ADMIN — ISOSORBIDE DINITRATE 20 MG: 20 TABLET ORAL at 09:47

## 2024-05-03 RX ADMIN — HEPARIN SODIUM 2400 UNITS: 1000 INJECTION INTRAVENOUS; SUBCUTANEOUS at 17:38

## 2024-05-03 RX ADMIN — LOSARTAN POTASSIUM 50 MG: 50 TABLET, FILM COATED ORAL at 09:46

## 2024-05-03 RX ADMIN — ARFORMOTEROL TARTRATE 15 MCG: 15 SOLUTION RESPIRATORY (INHALATION) at 18:28

## 2024-05-03 RX ADMIN — FAMOTIDINE 40 MG: 20 TABLET ORAL at 09:47

## 2024-05-03 RX ADMIN — TOBRAMYCIN 300 MG: 300 SOLUTION ORAL at 21:01

## 2024-05-03 RX ADMIN — CEFEPIME 2000 MG: 2 INJECTION, POWDER, FOR SOLUTION INTRAVENOUS at 22:32

## 2024-05-03 RX ADMIN — Medication 10 ML: at 22:35

## 2024-05-03 RX ADMIN — CARVEDILOL 25 MG: 25 TABLET, FILM COATED ORAL at 09:47

## 2024-05-03 RX ADMIN — CARVEDILOL 25 MG: 25 TABLET, FILM COATED ORAL at 18:06

## 2024-05-03 RX ADMIN — PREDNISONE 40 MG: 20 TABLET ORAL at 09:47

## 2024-05-03 RX ADMIN — SENNOSIDES AND DOCUSATE SODIUM 2 TABLET: 50; 8.6 TABLET ORAL at 09:46

## 2024-05-03 RX ADMIN — ISOSORBIDE DINITRATE 20 MG: 20 TABLET ORAL at 18:06

## 2024-05-03 RX ADMIN — Medication 10 ML: at 09:53

## 2024-05-03 RX ADMIN — TEMAZEPAM 15 MG: 15 CAPSULE ORAL at 22:39

## 2024-05-03 RX ADMIN — HYDROMORPHONE HYDROCHLORIDE 0.5 MG: 1 INJECTION, SOLUTION INTRAMUSCULAR; INTRAVENOUS; SUBCUTANEOUS at 12:23

## 2024-05-03 NOTE — THERAPY EVALUATION
Acute Care - Physical Therapy Initial Evaluation   [place]     Patient Name: Charlette Rai  : 1935  MRN: [id number]  Today's Date: 5/3/2024     Admit date: 2024     Referring Physician: Rafael Lyons MD     Surgery Date:* No surgery found *             Visit Dx:     ICD-10-CM ICD-9-CM   1. Confusion  R41.0 298.9   2. Chronic kidney disease, unspecified CKD stage  N18.9 585.9   3. Chronic anemia  D64.9 285.9   4. Chronic midline thoracic back pain  M54.6 724.1    G89.29 338.29   5. Chronic obstructive pulmonary disease, unspecified COPD type  J44.9 496   6. Weakness  R53.1 780.79   7. Difficulty in walking  R26.2 719.7     Patient Active Problem List   Diagnosis    Malignant neoplasm of upper lobe of right lung    Allergic rhinitis    Rheumatoid arthritis    Asthma    Essential hypertension    GERD (gastroesophageal reflux disease)    Hyperlipidemia LDL goal <70    Lumbar spinal stenosis    Migraine    Monoclonal paraproteinemia    Osteopenia    Oxygen dependent    Peripheral neuropathy    Stage 4 chronic kidney disease    Type 2 diabetes mellitus    Vitamin D deficiency    Carotid artery stenosis    Chronic right-sided thoracic back pain    Acute pain of left shoulder    Peripheral vascular disease of lower extremity    Edema    Ex-smoker    Peripheral vascular disease    De Quervain's tenosynovitis    Arthritis of carpometacarpal (CMC) joint of right thumb    Arthritis of right hand    Steal syndrome of dialysis vascular access    Anemia of chronic renal failure, stage 4 (severe)    Anemia of chronic disease    Aortic stenosis, moderate    Hematoma    ESRD (end stage renal disease)    CHF (congestive heart failure)    ESRD (end stage renal disease)    Volume overload    ESRD (end stage renal disease)    Pseudomonas pneumonia    Confusion    History of recent pneumonia     Past Medical History:   Diagnosis Date    Allergic rhinitis     Anemia     Arthritis     Asthma     USE INHALERS AND NEBULIZERS     Back pain     Bladder disorder     Cancer     LEFT LUNG CANCER 2005 SURGERY AND CHEMO DONE  AND CURRENTLY 4/ 14/22  RIGHT LUNG CANCER HAS ONLY RECEIVED RADIATION THUS FAR    Chronic kidney disease     stage 3    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 DENIES CP BUT DOES HAVE SOA CHRONIC ISSUE COPD/LUNG CANCER    COPD (chronic obstructive pulmonary disease)     FOLLOWED BY DR MARIAJOSE BAILEY    Coronary artery disease     DENIES CP BUT DOES GET SOA MOST OF THE TIME WITH EXERTION BUT OCC AT REST CHRONIC ISSUE COPD/LUNG CANCER    Deep vein thrombosis     Diabetes mellitus     DOES NOT CHECK BS DAILY    Disease of thyroid gland     HYPOTHYROIDISM    Essential hypertension     Gastric ulcer     GERD (gastroesophageal reflux disease)     Heart murmur     History of transfusion     NO ISSUES POST TRANSFUSION WAS MANY YEARS AGO    Hyperlipemia     Leukocytopenia     FOLLOWED BY DR MIR BAILEY    Limb swelling     Lumbago     Lumbar spinal stenosis     Lung cancer     Migraine headache     Multiple joint pain     On home oxygen therapy     3L/NC PRN    Osteopenia     Reflux esophagitis     Shortness of breath     Thyroid nodule     HAS ULTRASOUND YEARLY BEING MONITORED    Vascular disease     Vitamin D deficiency      Past Surgical History:   Procedure Laterality Date    ABDOMINAL SURGERY      APPENDECTOMY      ARTERIOVENOUS FISTULA/SHUNT SURGERY Left 05/10/2022    Procedure: Left basilic vein transposition;  Surgeon: Wan Barksdale MD;  Location: [facility] MAIN OR;  Service: Vascular;  Laterality: Left;    ARTERIOVENOUS FISTULA/SHUNT SURGERY Left 3/23/2023    Procedure: Ligation of left arm arteriovenous fistula;  Surgeon: Wan Barksdale MD;  Location: [facility] MAIN OR;  Service: Vascular;  Laterality: Left;    ARTERIOVENOUS FISTULA/SHUNT SURGERY Left 11/30/2023     Procedure: Creation of left arm arteriovenous graft;  Surgeon: Wan Barksdale MD;  Location: [facility] MAIN OR;  Service: Vascular;  Laterality: Left;    BRONCHOSCOPY N/A 4/24/2024    Procedure: BRONCHOSCOPY WITH BAL AND WASHINGS;  Surgeon: Khalif Yanez MD;  Location: [facility] ENDOSCOPY;  Service: Pulmonary;  Laterality: N/A;  MUCUS PLUGGING    CARDIAC CATHETERIZATION  1996    CARDIAC SURGERY      CARDIAC SURGERY      fluid drained from heart    CATARACT EXTRACTION, BILATERAL  2003    COLONOSCOPY  2014    ENDOSCOPY  2016 2019    FEMORAL ARTERY STENT Bilateral     HYSTERECTOMY      LUNG BIOPSY Left 2005    lobectomy upper lung caner    LUNG VOLUME REDUCTION      OTHER SURGICAL HISTORY      artifical joints/limbs    REPLACEMENT TOTAL KNEE Left 2016    UPPER GASTROINTESTINAL ENDOSCOPY       PT Assessment (Last 12 Hours)       PT Evaluation and Treatment       Row Name 05/03/24 1000          Physical Therapy Time and Intention    Subjective Information no complaints  -SELWYN     Document Type evaluation  -SELWYN     Mode of Treatment individual therapy;physical therapy  -SELWYN     Patient Effort good  -SELWYN       Row Name 05/03/24 1000          General Information    Patient Observations alert;cooperative;agree to therapy  -SELWYN     Prior Level of Function independent:;all household mobility;community mobility  -SELWYN     Equipment Currently Used at Home oxygen;walker, rolling  -SELWYN     Existing Precautions/Restrictions fall  -SELWYN     Barriers to Rehab none identified  -SELWYN       Row Name 05/03/24 1000          Living Environment    Current Living Arrangements home  -SELWYN     People in Home spouse  -SELWYN       Row Name 05/03/24 1000          Range of Motion (ROM)    Range of Motion ROM is WFL  -SELWYN       Row Name 05/03/24 1000          Strength (Manual Muscle Testing)    Strength (Manual Muscle Testing) strength is L  -SELWYN       Row Name 05/03/24 1000          Bed Mobility    Bed Mobility bed mobility (all) activities;supine-sit  -SELWYN     All  Activities, Dare (Bed Mobility) contact guard  -SELWYN     Supine-Sit Dare (Bed Mobility) contact guard  -SELWYN       Row Name 05/03/24 1000          Transfers    Transfers bed-chair transfer;sit-stand transfer  -SELWYN       Row Name 05/03/24 1000          Bed-Chair Transfer    Bed-Chair [place] (Transfers) contact guard  -SELWYN     Assistive Device (Bed-Chair Transfers) walker, front-wheeled  -SELWYN       Row Name 05/03/24 1000          Sit-Stand Transfer    Sit-Stand Dare (Transfers) contact guard  -SELWYN     Assistive Device (Sit-Stand Transfers) walker, front-wheeled  -SELWYN       Row Name 05/03/24 1000          Gait/Stairs (Locomotion)    Gait/Stairs Locomotion gait/ambulation assistive device  -SELWYN     [place] Level (Gait) contact guard  -SELWYN     Assistive Device (Gait) walker, front-wheeled  -SELWYN     Distance in Feet (Gait) 100  SOA easily  -SELWYN       Row Name 05/03/24 1000          Safety Issues, Functional Mobility    Impairments Affecting Function (Mobility) balance;strength  -SELWYN       Row Name 05/03/24 1000          Balance    Balance Assessment standing dynamic balance  -SELWYN     Dynamic Standing Balance contact guard  -SELWYN     Position/Device Used, Standing Balance walker, front-wheeled  -SELWYN       Row Name 05/03/24 1000          Plan of Care Review    Plan of Care Reviewed With patient  -SELWYN     Outcome Evaluation Patient presents with decreased strength, transfers and ambulation.  Skilled physical therapy services will be required to address these mobility deficits.  -SELWYN       Row Name 05/03/24 1000          Therapy Assessment/Plan (PT)    Rehab Potential (PT) good, to achieve stated therapy goals  -SELWYN     Criteria for Skilled Interventions Met (PT) skilled treatment is necessary  -SELWYN     Therapy Frequency (PT) daily  -SELWYN     Predicted Duration of Therapy Intervention (PT) 10 days  -SELWYN     Problem List (PT) problems related to;balance;mobility;strength  -SELWYN     Activity Limitations Related to  Problem List (PT) unable to ambulate safely;unable to transfer safely  -SELWYN       Row Name 05/03/24 1000          PT Evaluation Complexity    History, PT Evaluation Complexity no personal factors and/or comorbidities  -SELWYN     Examination of Body Systems (PT Eval Complexity) total of 4 or more elements  -SELWYN     Clinical Presentation (PT Evaluation Complexity) stable  -SELWYN     Clinical Decision Making (PT Evaluation Complexity) low complexity  -SELWYN     Overall Complexity (PT Evaluation Complexity) low complexity  -SELWYN       Row Name 05/03/24 1000          Therapy Plan Review/Discharge Plan (PT)    Therapy Plan Review (PT) evaluation/treatment results reviewed;participants voiced agreement with care plan;participants included;patient  -SELWYN       Row Name 05/03/24 1000          Physical Therapy Goals    Transfer Goal Selection (PT) transfer, PT goal 1  -SELWYN     Gait Training Goal Selection (PT) gait training, PT goal 1  -SELWYN       Row Name 05/03/24 1000          Transfer Goal 1 (PT)    Activity/Assistive Device (Transfer Goal 1, PT) transfers, all  -SELWYN     [place] Level/Cues Needed (Transfer Goal 1, PT) independent  -SELWYN     Time Frame (Transfer Goal 1, PT) long term goal (LTG);10 days  -SELWYN       Row Name 05/03/24 1000          Gait Training Goal 1 (PT)    Activity/Assistive Device (Gait Training Goal 1, PT) gait (walking locomotion);assistive device use;walker, rolling  -SELWYN     [place] Level (Gait Training Goal 1, PT) independent  -SELWYN     Distance (Gait Training Goal 1, PT) 300  -SELWYN     Time Frame (Gait Training Goal 1, PT) long term goal (LTG);10 days  -SELWYN               User Key  (r) = Recorded By, (t) = Taken By, (c) = Cosigned By      Initials Name Provider Type    SELWYN Josh Almendarez, PT Physical Therapist                      PT Recommendation and Plan  Anticipated Discharge Disposition (PT): inpatient rehabilitation facility  Planned Therapy Interventions (PT): balance training, bed mobility training, gait  training, stair training, strengthening, transfer training  Therapy Frequency (PT): daily  Plan of Care Reviewed With: patient  Outcome Evaluation: Patient presents with decreased strength, transfers and ambulation.  Skilled physical therapy services will be required to address these mobility deficits.   Outcome Measures       Row Name 05/03/24 1000             How much help from another person do you currently need...    Turning from your back to your side while in flat bed without using bedrails? 3  -SELWYN      Moving from lying on back to sitting on the side of a flat bed without bedrails? 3  -SELWYN      Moving to and from a bed to a chair (including a wheelchair)? 3  -SELYWN      Standing up from a chair using your arms (e.g., wheelchair, bedside chair)? 3  -SELWYN      Climbing 3-5 steps with a railing? 3  -SELWYN      To walk in hospital room? 3  -SELWYN      AM-PAC 6 Clicks Score (PT) 18  -SELWYN      Highest Level of Mobility Goal 6 --> Walk 10 steps or more  -SELWNY         Functional Assessment    Outcome Measure Options AM-PAC 6 Clicks Basic Mobility (PT)  -SELWYN                User Key  (r) = Recorded By, (t) = Taken By, (c) = Cosigned By      Initials Name Provider Type    Josh Oconnell PT Physical Therapist                     Time Calculation:    PT Charges       Row Name 05/03/24 1052             Time Calculation    PT Received On 05/03/24  -SELWYN      PT Goal Re-Cert Due Date 05/12/24  -SELWYN         Untimed Charges    PT Eval/Re-eval Minutes 30  -SELWYN         Total Minutes    Untimed Charges Total Minutes 30  -SELWYN       Total Minutes 30  -SELWYN                User Key  (r) = Recorded By, (t) = Taken By, (c) = Cosigned By      Initials Name Provider Type    Josh Oconnell PT Physical Therapist                  Therapy Charges for Today       Code Description Service Date Service Provider Modifiers Qty    [id number] HC PT EVAL LOW COMPLEXITY 3 5/3/2024 Josh Almendarez PT GP 1            PT G-Codes  Outcome Measure Options:  AM-PAC 6 Clicks Basic Mobility (PT)  -[facility] 6 Clicks Score (PT): 18    Josh Almendarez, PT  5/3/2024

## 2024-05-03 NOTE — PROGRESS NOTES
Pulmonary / Critical Care Progress Note      Patient Name: Charlette Rai  : 1935  MRN: [id number]  Primary Care Physician:  Rafael Lyons MD  Date of admission: 2024    Subjective   Subjective   Follow-up for Pseudomonas pneumonia, ESRD on dialysis, respiratory failure    Over past 24 hours: Admitted with worsening chest discomfort.  Started back on IV cefepime.  Started JUAN nebulizer.    No acute events overnight.     This morning,   Feels about the same  Still short of breath with minimal activities.  Has cough, secretions improving.  No chest pain or chest tightness.  No nausea or vomiting.      Review of Systems  General:  Fatigue, No Fever.  HEENT: No dysphagia, No Visual Changes, no rhinorrhea  Respiratory:  + Productive cough,+Dyspnea, mucoid greenish-yellow phlegm, No Pleuritic Pain, + wheezing, no hemoptysis  Cardiovascular: Denies chest pain, denies palpitations,+HAIDER, No Chest Pressure  Gastrointestinal:  No Abdominal Pain, No Nausea, No Vomiting, No Diarrhea  Genitourinary:  No Dysuria, No Frequency, No Hesitancy  Musculoskeletal: No muscle pain or swelling  Endocrine:  No Heat Intolerance, No Cold Intolerance  Neurologic:  No Confusion, no Dysarthria, No Headaches  Skin:  No Rash, No Open Wounds          Objective   Objective     Vitals:   Temp:  [98.1 °F (36.7 °C)-98.4 °F (36.9 °C)] 98.1 °F (36.7 °C)  Heart Rate:  [76-88] 86  Resp:  [18] 18  BP: (140-177)/(46-63) 156/57  Flow (L/min):  [2] 2  Physical Exam   Vital Signs Reviewed   General:  WDWN, Alert, in mild distress, has conversational dyspnea.    HEENT:  PERRL, EOMI.  OP, nares clear, no sinus tenderness  Neck:  Supple, no JVD, no thyromegaly  Lymph: no axillary, cervical, supraclavicular lymphadenopathy noted bilaterally  Chest: Poor aeration, bilateral diminished breath sounds, scattered rhonchi, no wheezing, minimal basal crackles  CV: RRR, no MGR, pulses 2+, equal.  Abd:  Soft, NT, ND, + BS, no HSM  EXT:  no clubbing, no cyanosis,  no edema, no joint tenderness  Neuro:  A&Ox3, CN grossly intact, no focal deficits.  Skin: No rashes or lesions noted      Result Review    Result Review:  I have personally reviewed the results from the time of this admission to 5/3/2024 06:30 EDT and agree with these findings:  [x]  Laboratory  [x]  Microbiology  [x]  Radiology  [x]  EKG/Telemetry   [x]  Cardiology/Vascular   []  Pathology  []  Old records  []  Other:  Most notable findings include:         Lab 05/01/24 1949 04/29/24 0533 04/28/24  0605   WBC 7.11 6.80 6.34   HEMOGLOBIN 8.5* 8.4* 8.2*   HEMATOCRIT 28.1* 28.0* 28.4*   PLATELETS 123* 132* 153   SODIUM 136 137 139   POTASSIUM 3.9 3.9 4.1   CHLORIDE 102 106 105   CO2 24.6 21.0* 24.9   BUN 10 41* 34*   CREATININE 1.73* 3.51* 3.26*   GLUCOSE 228* 81 83   CALCIUM 8.1* 8.1* 8.3*   TOTAL PROTEIN 5.2*  --   --    ALBUMIN 3.3*  --   --    GLOBULIN 1.9  --   --      XR Chest 1 View    Result Date: 5/1/2024  XR CHEST 1 VW-  Date of Exam: 5/1/2024 7:53 PM  Indication: Weak/Dizzy/AMS triage protocol  Comparison: 4/21/2024  Findings: There is stable postoperative changes in the left hemithorax. There is associated consolidation and volume loss in the left hemithorax. Stable consolidation in the right midlung field. Stable right-sided central venous catheter with the tip in the region of the distal SVC. Stable blunting of the left costophrenic angle. Dense vascular calcification in the thoracic aorta. No evidence of pneumothorax.      Impression: Impression: Stable exam. Stable chronic appearing changes in the left hemithorax and right midlung field.   Electronically Signed By-YARELIS LUCAS MD On:5/1/2024 8:29 PM       CT Chest Without Contrast Diagnostic    Result Date: 4/22/2024  CT CHEST WO CONTRAST DIAGNOSTIC-  Date of Exam: 4/22/2024 7:49 PM  Indication: resp. failure; J44.1-h/o chronic obstructive pulmonary disease with (acute) exacerbation; SOA/SOB/shortness of air/shortness of breath  Comparisons:  4/21/2024; 3/13/2024.  Technique: Axial CT images were obtained of the chest without contrast administration.  Reconstructed 2D coronal and sagittal images were also obtained. Automated exposure control and iterative construction methods were used.  Findings: There has been interval placement of a tunneled dialysis catheter (TDC) from what is thought to represent a right internal jugular (IJ) vein approach (since 3/13/2024) with its distal-most tip in the right atrium. Again, extensive postoperative changes of the left thorax are noted, seen previously, with suspected prior left upper lobectomy and chronic pleural-parenchymal scarring and volume loss. Calcified pleural plaques are seen. There may be round atelectasis and/or pleural-parenchymal scarring, as well, in the remaining inferior left lung. There is chronic leftward mediastinal shift. Pleural-parenchymal scarring is seen in the hyperexpanded right lung, especially in the posterior right upper lobe. These findings are similar to that seen previously (on 3/13/2024). There is a new air-fluid level in the right mainstem bronchus, which is nonspecific. It may represent mucus or other retained secretion. Aspirated content cannot be excluded. No definite acute infiltrate is seen in the right lower lobe. There are stable subpleural opacities. No pneumothorax is seen. No pneumomediastinum. The small right pleural effusion has resolved. Minimal if any pleural effusion is appreciated currently. Probably no cardiac enlargement is present. No aneurysmal dilatation of the thoracic aorta. Interval evolution of the suspected right sided perinephric/subcapsular renal hematoma, probably accounting for the asymmetry in size of the partially imaged bilateral kidneys. Extensive atherosclerotic changes are seen, including involvement of the coronary arteries, thoracic aorta, abdominal aorta and its branches, and the great arteries. No thyroid enlargement. No enlarged axillary or  supraclavicular lymph nodes. There are small mediastinal lymph nodes. Probably no enlarged mediastinal lymph nodes are seen.      Impression:  Interval placement of a right IJ tunneled dialysis catheter (TDC) is noted since 3/13/2024, as discussed.  There has been interval decrease in (if not complete resolution of) the small right pleural effusion since 3/13/2024.  There is an air-fluid level in the right mainstem bronchus. It may represent mucus. Aspirated content cannot be excluded.  No definite acute infiltrate.  Otherwise, no significant interval change is appreciated since the prior exam from 3/13/2024.    Electronically Signed By-Chicho Jones MD On:4/22/2024 9:33 PM         Assessment & Plan   Assessment / Plan     Active Hospital Problems:  Active Hospital Problems    Diagnosis     **Confusion     History of recent pneumonia     ESRD (end stage renal disease)     Anemia of chronic disease          Impression:   Pseudomonas pneumonia, incompletely treated  Chest discomfort with likely bronchitis  Diastolic heart failure with acute exacerbation  COPD with acute exacerbation  Pseudomonas pneumonia  End-stage renal disease on dialysis  History of smoking in past  Persistent cough  Viral bronchitis with human metapneumovirus  Therapeutic drug monitoring of antibiotics    Plan:   Continue IV cefepime.  Consider 2 weeks of IV cefepime.  Continue JUAN neb twice daily.  Continue Brovana, Pulmicort and Yupelri.  Continue chest vest.  Recommend short-term rehab.  Dialysis per nephrology service.  Supplemental oxygen to keep saturation more than 90%.  I had extensive discussion with herself and her family regarding poor lung function, cardiac and renal issues.  She would benefit from short-term rehab.       DVT prophylaxis:  Mechanical DVT prophylaxis orders are present.        CODE STATUS:   Code Status (Patient has no pulse and is not breathing): CPR (Attempt to Resuscitate)  Medical Interventions (Patient has pulse  or is breathing): Full Support      I personally reviewed pertinent labs, imaging and provider notes. Discussed with bedside nurse and will discuss with primary service.       Electronically signed by Khalif Yanez MD, 5/3/2024, 06:30 EDT.

## 2024-05-03 NOTE — PLAN OF CARE
Goal Outcome Evaluation:  Patient BP monitored throughout the shift. Patient was off unit for dialysis today. Family at bedside and attentive to patient. No significant changes.

## 2024-05-03 NOTE — NURSING NOTE
Ms Rai is alert and oriented x 4. She is very pleasant. She tells me she has agreed to go to a rehab facility then plans to go home. She tells me, she refuses to go to a LTC facility. Her daughter has confirmed, the plans to get her home after rehab.  She has told me great stories of her family.   She has a living will on file but wants to remains full code with all interventions.   Palliative care will sign off at this time. Please re-consult if further needs arise.

## 2024-05-03 NOTE — NURSING NOTE
Pt completed full 3.5 hr of dialysis treatment. 2.8L removed and 73.7 BLP.    Pt BP tolerated fluid removal well. Pt states that SOB has decreased with treatment.    Pt dialysis catheter changed using aseptic technique and catheter care completed. Lines flushed, hep locked, curos caps applied, and gauze wrapped.    Pt denies any complaints at this time.    Pt in route to primary unit with transport. Pt on 2L of O2, alert and oriented x3, in no acute distress at this time.

## 2024-05-03 NOTE — PLAN OF CARE
Goal Outcome Evaluation:  Plan of Care Reviewed With: patient        Progress: no change  Outcome Evaluation: Pt. remains A & O X 4. Fistula noted to RAVEN. Nephrology consult placed this shift. Pt. is a HD pt. MWF. Ambulates with one assist in room.

## 2024-05-03 NOTE — PROGRESS NOTES
[facility]     Progress Note    Patient Name: Charlette Rai  : 1935  MRN: [id number]  Primary Care Physician:  Rafael Lyons MD  Date of admission: 2024      Subjective   Brief summary.  Patient admitted with weakness chest pain body aches.  Family requesting inpatient rehab    HPI:  Patient seen earlier this morning no chest pain no shortness of breath.  Waiting for dialysis.  Nephrologist notified.  No cough, minimal shortness of breath with exertion    Review of Systems     No dizziness or lightheadedness no confusion.  Chest wall pain with deep coughing.  No coughing up blood, no swelling of legs      Objective     Vitals:   Temp:  [97.9 °F (36.6 °C)-98.1 °F (36.7 °C)] 98 °F (36.7 °C)  Heart Rate:  [70-90] 73  Resp:  [12-19] 14  BP: (140-182)/(47-67) 144/53  Flow (L/min):  [2] 2    Physical Exam :     Elderly female not in acute distress.  Heart regular.  Lungs diminished breath sounds, chronic crackles and wheezes at bilateral bases..  Abdomen soft.  Extremities no edema      Result Review:  I have personally reviewed the results from the time of this admission to 5/3/2024 17:05 EDT and agree with these findings:  []  Laboratory  []  Microbiology  []  Radiology  []  EKG/Telemetry   []  Cardiology/Vascular   []  Pathology  []  Old records  []  Other:           Assessment / Plan       Active Hospital Problems:  Active Hospital Problems    Diagnosis     **Confusion     History of recent pneumonia     ESRD (end stage renal disease)     Anemia of chronic disease        Plan:   Mental status cleared, confusion resolved.  Seen by pulmonologist to continue antibiotics.  Discussed with nephrologist for dialysis today.  Continue PT OT monitor labs.  Possible discharge to rehab when bed available, insurance approval pending       DVT prophylaxis:  Medical and mechanical DVT prophylaxis orders are present.        CODE STATUS:   Code Status (Patient has no pulse and is not breathing): CPR (Attempt to  Resuscitate)  Medical Interventions (Patient has pulse or is breathing): Full Support            Electronically signed by Rafael Lyons MD, 05/03/24, 5:05 PM EDT.

## 2024-05-03 NOTE — CONSULTS
[facility] Consult Note      Patient Name: Charlette Rai  : 1935  MRN: [id number]  Primary Care Physician:  Rafael Lyons MD  Referring Physician: No ref. provider found  Date of admission: 2024    Subjective     Reason for Consult:  Dr Lyons    HPI:   Charlette Rai is a 89 y.o. female with ESRD on HD MWF via RIJ TDC (LUE AVF in place but has steal syn), HTN, Dm2, lung cancer who presented yesterday with generalized weakness and confusion.  She was recently hospitalized here  to 24 for COPD exac, viral bronchitis and pseudomonas PNA.  She refused needed rehab then.  This marks 3rd hospitalization in 3 weeks.  CXR stable with regard to chronic lung changes.  X-ray of thoracic spine shows chronic degenerative changes but nothing acute.  Palliative care briefly saw patient this AM and apparently she's now agreeable to going to rehab facility (with goal to then get back home).  She reports mild dyspnea today.  No n/v.  States no fluid removed at dialysis WED .      Review of Systems:   14 point review of systems is otherwise negative except for mentioned above on HPI    Personal History     Past Medical History:   Diagnosis Date    Allergic rhinitis     Anemia     Arthritis     Asthma     USE INHALERS AND NEBULIZERS    Back pain     Bladder disorder     Cancer     LEFT LUNG CANCER  SURGERY AND CHEMO DONE  AND CURRENTLY   RIGHT LUNG CANCER HAS ONLY RECEIVED RADIATION THUS FAR    Chronic kidney disease     stage 3    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 DENIES CP BUT DOES HAVE SOA CHRONIC ISSUE COPD/LUNG CANCER    COPD (chronic obstructive pulmonary disease)     FOLLOWED BY DR MARIAJOSE BAILEY    Coronary artery disease     DENIES CP BUT DOES GET SOA MOST OF THE TIME WITH EXERTION BUT OCC AT REST  CHRONIC ISSUE COPD/LUNG CANCER    Deep vein thrombosis     Diabetes mellitus     DOES NOT CHECK BS DAILY    Disease of thyroid gland     HYPOTHYROIDISM    Essential hypertension     Gastric ulcer     GERD (gastroesophageal reflux disease)     Heart murmur     History of transfusion     NO ISSUES POST TRANSFUSION WAS MANY YEARS AGO    Hyperlipemia     Leukocytopenia     FOLLOWED BY DR MIR BAILYE    Limb swelling     Lumbago     Lumbar spinal stenosis     Lung cancer     Migraine headache     Multiple joint pain     On home oxygen therapy     3L/NC PRN    Osteopenia     Reflux esophagitis     Shortness of breath     Thyroid nodule     HAS ULTRASOUND YEARLY BEING MONITORED    Vascular disease     Vitamin D deficiency        Past Surgical History:   Procedure Laterality Date    ABDOMINAL SURGERY      APPENDECTOMY      ARTERIOVENOUS FISTULA/SHUNT SURGERY Left 05/10/2022    Procedure: Left basilic vein transposition;  Surgeon: Wan Barksdale MD;  Location: [facility] MAIN OR;  Service: Vascular;  Laterality: Left;    ARTERIOVENOUS FISTULA/SHUNT SURGERY Left 3/23/2023    Procedure: Ligation of left arm arteriovenous fistula;  Surgeon: Wan Barksdale MD;  Location: [facility] MAIN OR;  Service: Vascular;  Laterality: Left;    ARTERIOVENOUS FISTULA/SHUNT SURGERY Left 11/30/2023    Procedure: Creation of left arm arteriovenous graft;  Surgeon: Wan Barksdale MD;  Location: [facility] MAIN OR;  Service: Vascular;  Laterality: Left;    BRONCHOSCOPY N/A 4/24/2024    Procedure: BRONCHOSCOPY WITH BAL AND WASHINGS;  Surgeon: Khalif Yanez MD;  Location: [facility] ENDOSCOPY;  Service: Pulmonary;  Laterality: N/A;  MUCUS PLUGGING    CARDIAC CATHETERIZATION  1996    CARDIAC SURGERY      CARDIAC SURGERY      fluid drained from heart    CATARACT EXTRACTION, BILATERAL  2003    COLONOSCOPY  2014    ENDOSCOPY  2016    2019    FEMORAL ARTERY STENT Bilateral     HYSTERECTOMY      LUNG BIOPSY Left 2005    lobectomy upper lung caner    LUNG VOLUME  REDUCTION      OTHER SURGICAL HISTORY      artifical joints/limbs    REPLACEMENT TOTAL KNEE Left 2016    UPPER GASTROINTESTINAL ENDOSCOPY         Family History: family history includes Arthritis in her father and mother; Cancer in her brother, brother, brother, and brother; Diabetes in her brother; Other in her brother; Prostate cancer in her brother and brother.    Social History:  reports that she quit smoking about 19 years ago. Her smoking use included cigarettes. She started smoking about 72 years ago. She has a 52.5 pack-year smoking history. She has been exposed to tobacco smoke. She has never used smokeless tobacco. She reports that she does not drink alcohol and does not use drugs.    Home Medications:  Prior to Admission medications    Medication Sig Start Date End Date Taking? Authorizing Provider   albuterol sulfate  (90 Base) MCG/ACT inhaler Inhale 2 puffs Every 4 (Four) Hours As Needed for Wheezing.   Yes Sarah Parker MD   aspirin 81 MG EC tablet Take 1 tablet by mouth Daily.   Yes Sarah Parker MD   budesonide-formoterol (SYMBICORT) 160-4.5 MCG/ACT inhaler Inhale 2 puffs 2 (Two) Times a Day.   Yes Sarah Parker MD   carvedilol (Coreg) 25 MG tablet Take 1 tablet by mouth 2 (Two) Times a Day With Meals for 30 days. 11/5/23 5/2/24 Yes Patrick Turcios MD   dexlansoprazole (DEXILANT) 60 MG capsule Take 1 capsule by mouth Daily. 9/23/22  Yes Sarah Parker MD   ergocalciferol (ERGOCALCIFEROL) 1.25 MG (35807 UT) capsule Take 1 capsule by mouth Every 14 (Fourteen) Days. 3/4/22  Yes Emergency, Nurse Epic, RN   hydrALAZINE (APRESOLINE) 25 MG tablet Take 1 tablet by mouth Every 12 (Twelve) Hours. 3/9/24  Yes Emily Chandra MD   ipratropium-albuterol (DUO-NEB) 0.5-2.5 mg/3 ml nebulizer Take 3 mL by nebulization Every 4 (Four) Hours As Needed for Shortness of Air or Wheezing for up to 30 days. 4/29/24 5/29/24 Yes Rafael Lyons MD   isosorbide dinitrate (ISORDIL) 20 MG  tablet Take 1 tablet by mouth 3 (Three) Times a Day for 30 days. 4/29/24 5/29/24 Yes Rafael Lyons MD   levocetirizine (XYZAL) 5 MG tablet TAKE 1 TABLET DAILY  Patient taking differently: Take 1 tablet by mouth Every Evening. 6/23/23  Yes Paloma James MD   levoFLOXacin (Levaquin) 250 MG tablet Take 1 tablet by mouth Daily for 7 days. 4/29/24 5/6/24 Yes Rafael Lyons MD   linagliptin (Tradjenta) 5 MG tablet tablet Take 1 tablet by mouth Daily. 1/10/22  Yes Paloma James MD   losartan (Cozaar) 25 MG tablet Take 2 tablets by mouth Daily. 5/1/24  Yes Sarah Parker MD   predniSONE (DELTASONE) 10 MG tablet Take 1 tablet by mouth Daily for 5 days. 4/29/24 5/4/24 Yes Rafael Lyons MD   rosuvastatin (CRESTOR) 20 MG tablet Take 1 tablet by mouth Daily. 7/7/23  Yes León Sanchez MD   sevelamer (RENVELA) 800 MG tablet Take 1 tablet by mouth 3 (Three) Times a Day With Meals. 10/9/23  Yes Sarah Parker MD   traMADol (ULTRAM) 50 MG tablet Take 1 tablet by mouth Every 8 (Eight) Hours As Needed for Moderate Pain for up to 30 days. 4/3/24 5/3/24 Yes Rafael Lyons MD   Trulicity 1.5 MG/0.5ML solution pen-injector Inject 1.5 mg under the skin into the appropriate area as directed Every 14 (Fourteen) Days.   Yes ProviderSarah MD   nitroglycerin (NITROSTAT) 0.4 MG SL tablet Place 1 tablet under the tongue Every 5 (Five) Minutes As Needed for Chest Pain.    ProviderSarah MD       Allergies:  No Known Allergies    Objective     Vitals:   Temp:  [97.9 °F (36.6 °C)-98.1 °F (36.7 °C)] 98.1 °F (36.7 °C)  Heart Rate:  [78-90] 84  Resp:  [18-19] 19  BP: (140-167)/(46-57) 161/47  Flow (L/min):  [2] 2    Intake/Output Summary (Last 24 hours) at 5/3/2024 1158  Last data filed at 5/3/2024 0909  Gross per 24 hour   Intake 200 ml   Output --   Net 200 ml       Physical Exam:    General Appearance: frail thin WF in no acute distress in chair with NC O2  Skin: warm and dry  HEENT: oral mucosa  normal, nonicteric sclera  Neck: RIJ TDC in place, no JVD  Lungs: coarse BS with bilat rhonchi  Heart: RRR, normal S1 and S2  Abdomen: soft, nontender, nondistended  : no palpable bladder  Extremities: no edema, LUE AVF +T/B  Neuro: normal speech and mental status     Scheduled Meds:     arformoterol, 15 mcg, Nebulization, BID - RT  budesonide, 0.5 mg, Nebulization, BID - RT  carvedilol, 25 mg, Oral, BID With Meals  cefepime, 2,000 mg, Intravenous, Nightly  famotidine, 40 mg, Oral, Daily  hydrALAZINE, 25 mg, Oral, Q12H  isosorbide dinitrate, 20 mg, Oral, TID - Nitrates  losartan, 50 mg, Oral, Q24H  revefenacin, 175 mcg, Nebulization, Daily - RT  senna-docusate sodium, 2 tablet, Oral, BID  sodium chloride, 10 mL, Intravenous, Q12H  tobramycin PF, 300 mg, Nebulization, Q12H - RT      IV Meds:   Pharmacy to Dose Cefepime,         Results Reviewed:   I have personally reviewed the results from the time of this admission to 5/3/2024 11:58 EDT     Lab Results   Component Value Date    GLUCOSE 228 (H) 05/01/2024    CALCIUM 8.1 (L) 05/01/2024     05/01/2024    K 3.9 05/01/2024    CO2 24.6 05/01/2024     05/01/2024    BUN 10 05/01/2024    CREATININE 1.73 (H) 05/01/2024    EGFRIFNONA 17 (L) 01/10/2022    BCR 5.8 (L) 05/01/2024    ANIONGAP 9.4 05/01/2024      Lab Results   Component Value Date    MG 1.7 05/01/2024    PHOS 2.8 04/25/2024    ALBUMIN 3.3 (L) 05/01/2024           Assessment / Plan     ASSESSMENT:  - ESRD, dialysis 3 times a week MWF at [facility], due for treatment today.  Volume status is adequate.  Draw labs on dialysis.  Using RIJ TDC for now, having steal syn with LUE AVF and seen by vascular surg end of hospitalization; she declined further intervention for now  - COPD exacerbation, recent viral bronchitis (human metapneumovirus), pseudomonas PNA - PULM involved again, changing LQ to cefepime to treat latter.  Steroids.  CXR noted  - Anemia of chronic kidney disease, receiving  long-acting CASSANDRA as outpatient.  Check Hgb  - Diastolic dysfunction with LVH and moderate aortic stenosis.     - Left renal lesion with recent spontaneous right perinephric subcapsular hematoma, needs urology follow-up as outpatient.  - History of hypertension, BP bit high, should improve with UF; on coreg, ARB, hydralazine, room to titrate latter two if needed   - Type 2 diabetes with complications.  - History of lung cancer     PLAN:  HD today, remove 2.5L as tolerated  Draw labs start of dialysis  Rehab placement in progress, she's now agreeable and it's clearly needed    Thank you for involving us in the care of Charlette Rai.  Please feel free to call with any questions.    Dakota Morgan MD  05/03/24  11:58 EDT    [facility]  [phone]

## 2024-05-03 NOTE — THERAPY EVALUATION
Patient Name: Charlette Rai  : 1935    MRN: [id number]                              Today's Date: 5/3/2024       Admit Date: 2024    Visit Dx:     ICD-10-CM ICD-9-CM   1. Confusion  R41.0 298.9   2. Chronic kidney disease, unspecified CKD stage  N18.9 585.9   3. Chronic anemia  D64.9 285.9   4. Chronic midline thoracic back pain  M54.6 724.1    G89.29 338.29   5. Chronic obstructive pulmonary disease, unspecified COPD type  J44.9 496   6. Weakness  R53.1 780.79   7. Difficulty in walking  R26.2 719.7   8. Decreased activities of daily living (ADL)  Z78.9 V49.89     Patient Active Problem List   Diagnosis    Malignant neoplasm of upper lobe of right lung    Allergic rhinitis    Rheumatoid arthritis    Asthma    Essential hypertension    GERD (gastroesophageal reflux disease)    Hyperlipidemia LDL goal <70    Lumbar spinal stenosis    Migraine    Monoclonal paraproteinemia    Osteopenia    Oxygen dependent    Peripheral neuropathy    Stage 4 chronic kidney disease    Type 2 diabetes mellitus    Vitamin D deficiency    Carotid artery stenosis    Chronic right-sided thoracic back pain    Acute pain of left shoulder    Peripheral vascular disease of lower extremity    Edema    Ex-smoker    Peripheral vascular disease    De Quervain's tenosynovitis    Arthritis of carpometacarpal (CMC) joint of right thumb    Arthritis of right hand    Steal syndrome of dialysis vascular access    Anemia of chronic renal failure, stage 4 (severe)    Anemia of chronic disease    Aortic stenosis, moderate    Hematoma    ESRD (end stage renal disease)    CHF (congestive heart failure)    ESRD (end stage renal disease)    Volume overload    ESRD (end stage renal disease)    Pseudomonas pneumonia    Confusion    History of recent pneumonia     Past Medical History:   Diagnosis Date    Allergic rhinitis     Anemia     Arthritis     Asthma     USE INHALERS AND NEBULIZERS    Back pain     Bladder disorder     Cancer     LEFT LUNG  CANCER 2005 SURGERY AND CHEMO DONE  AND CURRENTLY 4/ 14/22  RIGHT LUNG CANCER HAS ONLY RECEIVED RADIATION THUS FAR    Chronic kidney disease     stage 3    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 DENIES CP BUT DOES HAVE SOA CHRONIC ISSUE COPD/LUNG CANCER    COPD (chronic obstructive pulmonary disease)     FOLLOWED BY DR MARIAJOSE BAILEY    Coronary artery disease     DENIES CP BUT DOES GET SOA MOST OF THE TIME WITH EXERTION BUT OCC AT REST CHRONIC ISSUE COPD/LUNG CANCER    Deep vein thrombosis     Diabetes mellitus     DOES NOT CHECK BS DAILY    Disease of thyroid gland     HYPOTHYROIDISM    Essential hypertension     Gastric ulcer     GERD (gastroesophageal reflux disease)     Heart murmur     History of transfusion     NO ISSUES POST TRANSFUSION WAS MANY YEARS AGO    Hyperlipemia     Leukocytopenia     FOLLOWED BY DR MIR BAILEY    Limb swelling     Lumbago     Lumbar spinal stenosis     Lung cancer     Migraine headache     Multiple joint pain     On home oxygen therapy     3L/NC PRN    Osteopenia     Reflux esophagitis     Shortness of breath     Thyroid nodule     HAS ULTRASOUND YEARLY BEING MONITORED    Vascular disease     Vitamin D deficiency      Past Surgical History:   Procedure Laterality Date    ABDOMINAL SURGERY      APPENDECTOMY      ARTERIOVENOUS FISTULA/SHUNT SURGERY Left 05/10/2022    Procedure: Left basilic vein transposition;  Surgeon: Wan Barksdale MD;  Location: [facility] OR;  Service: Vascular;  Laterality: Left;    ARTERIOVENOUS FISTULA/SHUNT SURGERY Left 3/23/2023    Procedure: Ligation of left arm arteriovenous fistula;  Surgeon: Wan Barksdale MD;  Location: [facility] MAIN OR;  Service: Vascular;  Laterality: Left;    ARTERIOVENOUS FISTULA/SHUNT SURGERY Left 11/30/2023    Procedure: Creation of left arm arteriovenous graft;   Surgeon: Wan Barksdale MD;  Location: [facility] MAIN OR;  Service: Vascular;  Laterality: Left;    BRONCHOSCOPY N/A 4/24/2024    Procedure: BRONCHOSCOPY WITH BAL AND WASHINGS;  Surgeon: Khalif Yanez MD;  Location: [facility] ENDOSCOPY;  Service: Pulmonary;  Laterality: N/A;  MUCUS PLUGGING    CARDIAC CATHETERIZATION  1996    CARDIAC SURGERY      CARDIAC SURGERY      fluid drained from heart    CATARACT EXTRACTION, BILATERAL  2003    COLONOSCOPY  2014    ENDOSCOPY  2016 2019    FEMORAL ARTERY STENT Bilateral     HYSTERECTOMY      LUNG BIOPSY Left 2005    lobectomy upper lung caner    LUNG VOLUME REDUCTION      OTHER SURGICAL HISTORY      artifical joints/limbs    REPLACEMENT TOTAL KNEE Left 2016    UPPER GASTROINTESTINAL ENDOSCOPY        General Information       Row Name 05/03/24 1101          OT Time and Intention    Document Type evaluation (P)   -     Mode of Treatment individual therapy;occupational therapy (P)   -       Row Name 05/03/24 1101          General Information    Patient Profile Reviewed yes (P)   -KH     Prior Level of Function independent:;all household mobility;bed mobility;ADL's (P)   Pt uses a tub shower with shower chair, uses 3L O2 at home, walker, w/c, and cane to navigate home enviornment, declines any falls within last 3 months.  -     Existing Precautions/Restrictions fall;oxygen therapy device and L/min (P)   -KH     Barriers to Rehab none identified (P)   -       Row Name 05/03/24 1101          Occupational Profile    Reason for Services/Referral (Occupational Profile) Pt is 89 yr old female admitted to [facility] on 5/1/2024 with general weakness and confusion. OT evaluation and treatment ordered d/t recent decline in ADLs/transfer ability and discharge planning recommendations. No previous OT services for current condition. (P)   -       Row Name 05/03/24 1101          Living Environment    People in Home spouse (P)   -       Row Name 05/03/24 1101          Home  Main Entrance    Number of Stairs, Main Entrance three (P)   -[facility] Name 05/03/24 1101          Stairs Within Home, Primary    Number of Stairs, Within Home, Primary other (see comments) (P)   -     Stairs Comment, Within Home, Primary Flight of stairs to basement, nonessential (P)   -[facility] Name 05/03/24 1101          Cognition    Orientation Status (Cognition) oriented x 4 (P)   -[facility] Name 05/03/24 1101          Safety Issues, Functional Mobility    Impairments Affecting Function (Mobility) balance;strength;grasp;shortness of breath (P)   -               User Key  (r) = Recorded By, (t) = Taken By, (c) = Cosigned By      Initials Name Provider Type    Trisha Lewis, OT Student OT Student                     Mobility/ADL's       [facility] Name 05/03/24 1107          Bed Mobility    Bed Mobility bed mobility (all) activities (P)   -     All Activities, [place] (Bed Mobility) not tested (P)   -     Comment, (Bed Mobility) Pt presented and ended session in chair. (P)   -[facility] Name 05/03/24 1107          Transfers    Transfers sit-stand transfer;stand-sit transfer;toilet transfer (P)   -[facility] Name 05/03/24 1107          Sit-Stand Transfer    Sit-Stand [place] (Transfers) standby assist;1 person assist (P)   -     Assistive Device (Sit-Stand Transfers) walker, front-wheeled (P)   -[facility] Name 05/03/24 1107          Stand-Sit Transfer    Stand-Sit [place] (Transfers) standby assist;1 person assist (P)   -     Assistive Device (Stand-Sit Transfers) walker, front-wheeled (P)   -[facility] Name 05/03/24 1107          Toilet Transfer    Type (Toilet Transfer) sit-stand;stand-sit (P)   -     [place] Level (Toilet Transfer) standby assist;1 person assist (P)   -     Assistive Device (Toilet Transfer) walker, front-wheeled;commode chair (P)   -[facility] Name 05/03/24 1107          Functional Mobility    Functional Mobility- Ind. Level 1  person;standby assist (P)   -     Functional Mobility- Comment Pt completed functional mobility to mobilize within her room from the chair to the bathroom and back to the chair with SBA for pt safety. (P)   -[facility] Name 05/03/24 1107          Activities of Daily Living    BADL Assessment/Intervention bathing;upper body dressing;lower body dressing;grooming;toileting;feeding (P)   -[facility] Name 05/03/24 1107          Bathing Assessment/Intervention    [place] Level (Bathing) bathing skills;supervision (P)   -[facility] Name 05/03/24 1107          Upper Body Dressing Assessment/Training    [place] Level (Upper Body Dressing) upper body dressing skills;standby assist (P)   -[facility] Name 05/03/24 1107          Lower Body Dressing Assessment/Training    [place] Level (Lower Body Dressing) lower body dressing skills;standby assist (P)   -[facility] Name 05/03/24 1107          Grooming Assessment/Training    [place] Level (Grooming) grooming skills;supervision (P)   -KH       Row Name 05/03/24 1107          Toileting Assessment/Training    [place] Level (Toileting) toileting skills;supervision (P)   -     Assistive Devices (Toileting) commode chair (P)   -     Position (Toileting) supported sitting (P)   -               User Key  (r) = Recorded By, (t) = Taken By, (c) = Cosigned By      Initials Name Provider Type    Trisha Lewis, OT Student OT Student                   Obj/Interventions       [facility] Name 05/03/24 1111          Range of Motion Comprehensive    General Range of Motion bilateral upper extremity ROM WFL (P)   -[facility] Name 05/03/24 1111          Strength Comprehensive (MMT)    General Manual Muscle Testing (MMT) Assessment upper extremity strength deficits identified (P)   -SANGITA     Comment, General Manual Muscle Testing (MMT) Assessment BUE biceps/triceps 3+/5, bilateral grasp 4-/5 (P)   -[facility] Name 05/03/24 1111          Motor Skills     Motor Skills functional endurance (P)   -KH     Functional Endurance F+, pt engaged in functional mobility to complete sit-stand transfer in prepataion for toileting with good participation. Pt reported no fatigue at end of session. (P)   -       Row Name 05/03/24 1111          Balance    Balance Assessment sitting static balance;standing dynamic balance (P)   -KH     Static Sitting Balance independent (P)   -KH     Position, Sitting Balance sitting in chair (P)   -KH     Dynamic Standing Balance standby assist;1-person assist (P)   -KH     Position/Device Used, Standing Balance walker, front-wheeled (P)   -KH               User Key  (r) = Recorded By, (t) = Taken By, (c) = Cosigned By      Initials Name Provider Type    Trisha Lewis, OT Student OT Student                   Goals/Plan       [facility] Name 05/03/24 1116          Bed Mobility Goal 1 (OT)    Activity/Assistive Device (Bed Mobility Goal 1, OT) bed mobility activities, all (P)   -KH     [place] Level/Cues Needed (Bed Mobility Goal 1, OT) modified independence (P)   -KH     Time Frame (Bed Mobility Goal 1, OT) long term goal (LTG);10 days (P)   -       Row Name 05/03/24 1116          Transfer Goal 1 (OT)    Activity/Assistive Device (Transfer Goal 1, OT) transfers, all (P)   -KH     [place] Level/Cues Needed (Transfer Goal 1, OT) modified independence (P)   -KH     Time Frame (Transfer Goal 1, OT) long term goal (LTG);10 days (P)   -       Row Name 05/03/24 1116          Bathing Goal 1 (OT)    Activity/Device (Bathing Goal 1, OT) bathing skills, all (P)   -KH     [place] Level/Cues Needed (Bathing Goal 1, OT) modified independence (P)   -KH     Time Frame (Bathing Goal 1, OT) long term goal (LTG);10 days (P)   -       Row Name 05/03/24 1116          Dressing Goal 1 (OT)    Activity/Device (Dressing Goal 1, OT) dressing skills, all (P)   -KH     [place]/Cues Needed (Dressing Goal 1, OT) modified independence (P)   -KH     Time  Frame (Dressing Goal 1, OT) long term goal (LTG);10 days (P)   -       Row Name 05/03/24 1116          Toileting Goal 1 (OT)    Activity/Device (Toileting Goal 1, OT) toileting skills, all (P)   -KH     [place] Level/Cues Needed (Toileting Goal 1, OT) modified independence (P)   -KH     Time Frame (Toileting Goal 1, OT) long term goal (LTG);10 days (P)   -       Row Name 05/03/24 1116          Grooming Goal 1 (OT)    Activity/Device (Grooming Goal 1, OT) grooming skills, all (P)   -KH     [place] (Grooming Goal 1, OT) modified independence (P)   -KH     Time Frame (Grooming Goal 1, OT) long term goal (LTG);10 days (P)   -       Row Name 05/03/24 1116          Strength Goal 1 (OT)    Strength Goal 1 (OT) Pt will demonstrate 4/5 strength in BUE in preparation for independent ADL completion at time of discharge. (P)   -KH     Time Frame (Strength Goal 1, OT) long term goal (LTG);10 days (P)   -       Row Name 05/03/24 1116          Therapy Assessment/Plan (OT)    Planned Therapy Interventions (OT) functional balance retraining;patient/caregiver education/training;ROM/therapeutic exercise;strengthening exercise;passive ROM/stretching;transfer/mobility retraining;occupation/activity based interventions;IADL retraining;BADL retraining (P)   -               User Key  (r) = Recorded By, (t) = Taken By, (c) = Cosigned By      Initials Name Provider Type    Trisha Lewis, OT Student OT Student                   Clinical Impression       Row Name 05/03/24 1113          Plan of Care Review    Plan of Care Reviewed With patient (P)   -KH     Progress no change (P)   -     Outcome Evaluation Pt presents with strength, grasp and balance deficits impating her ability to independently complete ADLs and transfers. Pt completed functional mobility to mobilize to the bathroom to participate in toileting with good participation. Pt would benefit from skilled OT services to promote baseline independence. (P)    -       Row Name 05/03/24 1113          Therapy Assessment/Plan (OT)    Rehab Potential (OT) good, to achieve stated therapy goals (P)   -     Criteria for Skilled Therapeutic Interventions Met (OT) yes;meets criteria;skilled treatment is necessary (P)   -     Therapy Frequency (OT) 5 times/wk (P)   -       Row Name 05/03/24 1113          Therapy Plan Review/Discharge Plan (OT)    Anticipated Discharge Disposition (OT) home with outpatient therapy services (P)   -       Row Name 05/03/24 1113          Positioning and Restraints    Pre-Treatment Position sitting in chair/recliner (P)   -KH     Post Treatment Position chair (P)   -KH     In Chair sitting;call light within reach;encouraged to call for assist;exit alarm on (P)   -               User Key  (r) = Recorded By, (t) = Taken By, (c) = Cosigned By      Initials Name Provider Type    Trisha Lewis, OT Student OT Student                   Outcome Measures       Row Name 05/03/24 1118          How much help from another is currently needed...    Putting on and taking off regular lower body clothing? 3 (P)   -KH     Bathing (including washing, rinsing, and drying) 3 (P)   -KH     Toileting (which includes using toilet bed pan or urinal) 3 (P)   -KH     Putting on and taking off regular upper body clothing 3 (P)   -KH     Taking care of personal grooming (such as brushing teeth) 3 (P)   -KH     Eating meals 3 (P)   -KH     AM-PAC 6 Clicks Score (OT) 18 (P)   -       Row Name 05/03/24 1000          How much help from another person do you currently need...    Turning from your back to your side while in flat bed without using bedrails? 3  -SELWYN     Moving from lying on back to sitting on the side of a flat bed without bedrails? 3  -SELWYN     Moving to and from a bed to a chair (including a wheelchair)? 3  -SELWYN     Standing up from a chair using your arms (e.g., wheelchair, bedside chair)? 3  -SELWYN     Climbing 3-5 steps with a railing? 3  -SELWYN     To  walk in hospital room? 3  -SELWYN     AM-PAC 6 Clicks Score (PT) 18  -SELWYN     Highest Level of Mobility Goal 6 --> Walk 10 steps or more  -SELWYN       Row Name 05/03/24 1118 05/03/24 1000       Functional Assessment    Outcome Measure Options AM-PAC 6 Clicks Daily Activity (OT) (P)   -SANGITA AM-PAC 6 Clicks Basic Mobility (PT)  -SELWYN              User Key  (r) = Recorded By, (t) = Taken By, (c) = Cosigned By      Initials Name Provider Type    SELWYN Josh Almendarez, PT Physical Therapist    Trisha Lewis, OT Student OT Student                      OT Recommendation and Plan  Planned Therapy Interventions (OT): (P) functional balance retraining, patient/caregiver education/training, ROM/therapeutic exercise, strengthening exercise, passive ROM/stretching, transfer/mobility retraining, occupation/activity based interventions, IADL retraining, BADL retraining  Therapy Frequency (OT): (P) 5 times/wk  Plan of Care Review  Plan of Care Reviewed With: (P) patient  Progress: (P) no change  Outcome Evaluation: (P) Pt presents with strength, grasp and balance deficits impating her ability to independently complete ADLs and transfers. Pt completed functional mobility to mobilize to the bathroom to participate in toileting with good participation. Pt would benefit from skilled OT services to promote baseline independence.     Time Calculation:   Evaluation Complexity (OT)  Review Occupational Profile/Medical/Therapy History Complexity: (P) brief/low complexity  Assessment, Occupational Performance/Identification of Deficit Complexity: (P) 1-3 performance deficits  Clinical Decision Making Complexity (OT): (P) problem focused assessment/low complexity  Overall Complexity of Evaluation (OT): (P) low complexity     Time Calculation- OT       Row Name 05/03/24 1121             Time Calculation- OT    OT Received On 05/03/24 (P)   -SANGITA      OT Goal Re-Cert Due Date 05/12/24 (P)   -SANGITA         Untimed Charges    OT Eval/Re-eval Minutes 31  -ES          Total Minutes    Untimed Charges Total Minutes 31  -ES       Total Minutes 31  -ES                User Key  (r) = Recorded By, (t) = Taken By, (c) = Cosigned By      Initials Name Provider Type    Rebeca Lantigua, OTR/L, CSRS Occupational Therapist    Trisha Lewis, OT Student OT Student                           Trisha Hallman, OT Student  5/3/2024

## 2024-05-03 NOTE — PLAN OF CARE
Goal Outcome Evaluation:  Plan of Care Reviewed With: (P) patient        Progress: (P) no change  Outcome Evaluation: (P) Pt presents with strength, grasp and balance deficits impating her ability to independently complete ADLs and transfers. Pt completed functional mobility to mobilize to the bathroom to participate in toileting with good participation. Pt would benefit from skilled OT services to promote baseline independence.      Anticipated Discharge Disposition (OT): (P) home with outpatient therapy services

## 2024-05-04 PROBLEM — J18.9 PNA (PNEUMONIA): Status: ACTIVE | Noted: 2024-05-04

## 2024-05-04 PROCEDURE — 25010000002 ONDANSETRON PER 1 MG: Performed by: INTERNAL MEDICINE

## 2024-05-04 PROCEDURE — 94761 N-INVAS EAR/PLS OXIMETRY MLT: CPT

## 2024-05-04 PROCEDURE — 94799 UNLISTED PULMONARY SVC/PX: CPT

## 2024-05-04 PROCEDURE — 25010000002 CEFEPIME PER 500 MG: Performed by: INTERNAL MEDICINE

## 2024-05-04 PROCEDURE — 94664 DEMO&/EVAL PT USE INHALER: CPT

## 2024-05-04 PROCEDURE — 63710000001 REVEFENACIN 175 MCG/3ML SOLUTION: Performed by: INTERNAL MEDICINE

## 2024-05-04 PROCEDURE — 99232 SBSQ HOSP IP/OBS MODERATE 35: CPT | Performed by: INTERNAL MEDICINE

## 2024-05-04 RX ORDER — SACCHAROMYCES BOULARDII 250 MG
250 CAPSULE ORAL 2 TIMES DAILY
Status: DISCONTINUED | OUTPATIENT
Start: 2024-05-04 | End: 2024-05-07 | Stop reason: HOSPADM

## 2024-05-04 RX ORDER — CARVEDILOL 12.5 MG/1
12.5 TABLET ORAL 2 TIMES DAILY WITH MEALS
Status: DISCONTINUED | OUTPATIENT
Start: 2024-05-04 | End: 2024-05-07 | Stop reason: HOSPADM

## 2024-05-04 RX ORDER — TRAMADOL HYDROCHLORIDE 50 MG/1
50 TABLET ORAL EVERY 8 HOURS PRN
Status: DISCONTINUED | OUTPATIENT
Start: 2024-05-04 | End: 2024-05-07 | Stop reason: HOSPADM

## 2024-05-04 RX ADMIN — CARVEDILOL 25 MG: 25 TABLET, FILM COATED ORAL at 09:56

## 2024-05-04 RX ADMIN — Medication 10 ML: at 22:32

## 2024-05-04 RX ADMIN — ACETAMINOPHEN 650 MG: 325 TABLET ORAL at 10:56

## 2024-05-04 RX ADMIN — LOSARTAN POTASSIUM 50 MG: 50 TABLET, FILM COATED ORAL at 09:50

## 2024-05-04 RX ADMIN — ONDANSETRON 4 MG: 2 INJECTION INTRAMUSCULAR; INTRAVENOUS at 17:53

## 2024-05-04 RX ADMIN — HYDRALAZINE HYDROCHLORIDE 25 MG: 25 TABLET ORAL at 09:48

## 2024-05-04 RX ADMIN — CEFEPIME 2000 MG: 2 INJECTION, POWDER, FOR SOLUTION INTRAVENOUS at 22:32

## 2024-05-04 RX ADMIN — BUDESONIDE 0.5 MG: 0.5 SUSPENSION RESPIRATORY (INHALATION) at 19:20

## 2024-05-04 RX ADMIN — CARVEDILOL 12.5 MG: 12.5 TABLET, FILM COATED ORAL at 18:12

## 2024-05-04 RX ADMIN — TOBRAMYCIN 300 MG: 300 SOLUTION ORAL at 19:20

## 2024-05-04 RX ADMIN — Medication 250 MG: at 22:31

## 2024-05-04 RX ADMIN — ARFORMOTEROL TARTRATE 15 MCG: 15 SOLUTION RESPIRATORY (INHALATION) at 06:58

## 2024-05-04 RX ADMIN — ARFORMOTEROL TARTRATE 15 MCG: 15 SOLUTION RESPIRATORY (INHALATION) at 19:20

## 2024-05-04 RX ADMIN — TEMAZEPAM 15 MG: 15 CAPSULE ORAL at 22:31

## 2024-05-04 RX ADMIN — FAMOTIDINE 40 MG: 20 TABLET ORAL at 09:49

## 2024-05-04 RX ADMIN — ISOSORBIDE DINITRATE 20 MG: 20 TABLET ORAL at 18:11

## 2024-05-04 RX ADMIN — REVEFENACIN 175 MCG: 175 SOLUTION RESPIRATORY (INHALATION) at 06:59

## 2024-05-04 RX ADMIN — BUDESONIDE 0.5 MG: 0.5 SUSPENSION RESPIRATORY (INHALATION) at 06:58

## 2024-05-04 RX ADMIN — TOBRAMYCIN 300 MG: 300 SOLUTION ORAL at 06:59

## 2024-05-04 RX ADMIN — Medication 10 ML: at 09:52

## 2024-05-04 RX ADMIN — SENNOSIDES AND DOCUSATE SODIUM 2 TABLET: 50; 8.6 TABLET ORAL at 09:49

## 2024-05-04 RX ADMIN — ISOSORBIDE DINITRATE 20 MG: 20 TABLET ORAL at 09:50

## 2024-05-04 RX ADMIN — ISOSORBIDE DINITRATE 20 MG: 20 TABLET ORAL at 14:02

## 2024-05-04 NOTE — PROGRESS NOTES
Pulmonary / Critical Care Progress Note      Patient Name: Charlette Rai  : 1935  MRN: [id number]  Primary Care Physician:  Rafael Lyons MD  Date of admission: 2024    Subjective   Subjective   Follow-up for Pseudomonas pneumonia, ESRD on dialysis, respiratory failure    Over past 24 hours: Admitted with worsening chest discomfort.  Started back on IV cefepime.  Started JUAN nebulizer.    No acute events overnight.     Complaining of nausea  Complaining of diarrhea    Review of Systems  Positive for nausea  Positive for diarrhea        Objective   Objective     Vitals:   Temp:  [97.7 °F (36.5 °C)-98.2 °F (36.8 °C)] 97.9 °F (36.6 °C)  Heart Rate:  [71-85] 77  Resp:  [16-20] 16  BP: (107-160)/(43-62) 107/43  Flow (L/min):  [2] 2  Physical Exam   Chronically ill-appearing female  Frail  Scattered crackles      Result Review    Result Review:  I have personally reviewed the results from the time of this admission to 2024 18:27 EDT and agree with these findings:  [x]  Laboratory  [x]  Microbiology  [x]  Radiology  [x]  EKG/Telemetry   [x]  Cardiology/Vascular   []  Pathology  []  Old records  []  Other:  Most notable findings include:         Lab 24  1351 24  1949 24  0533 24  0605   WBC 7.14 7.11 6.80 6.34   HEMOGLOBIN 8.9* 8.5* 8.4* 8.2*   HEMATOCRIT 29.8* 28.1* 28.0* 28.4*   PLATELETS 127* 123* 132* 153   SODIUM 134* 136 137 139   POTASSIUM 4.3 3.9 3.9 4.1   CHLORIDE 100 102 106 105   CO2 25.0 24.6 21.0* 24.9   BUN 23 10 41* 34*   CREATININE 3.21* 1.73* 3.51* 3.26*   GLUCOSE 141* 228* 81 83   CALCIUM 8.1* 8.1* 8.1* 8.3*   TOTAL PROTEIN  --  5.2*  --   --    ALBUMIN  --  3.3*  --   --    GLOBULIN  --  1.9  --   --      XR Chest 1 View    Result Date: 2024  XR CHEST 1 VW-  Date of Exam: 2024 7:53 PM  Indication: Weak/Dizzy/AMS triage protocol  Comparison: 2024  Findings: There is stable postoperative changes in the left hemithorax. There is associated consolidation  and volume loss in the left hemithorax. Stable consolidation in the right midlung field. Stable right-sided central venous catheter with the tip in the region of the distal SVC. Stable blunting of the left costophrenic angle. Dense vascular calcification in the thoracic aorta. No evidence of pneumothorax.      Impression: Impression: Stable exam. Stable chronic appearing changes in the left hemithorax and right midlung field.   Electronically Signed By-YARELIS LUCAS MD On:5/1/2024 8:29 PM       CT Chest Without Contrast Diagnostic    Result Date: 4/22/2024  CT CHEST WO CONTRAST DIAGNOSTIC-  Date of Exam: 4/22/2024 7:49 PM  Indication: resp. failure; J44.1-h/o chronic obstructive pulmonary disease with (acute) exacerbation; SOA/SOB/shortness of air/shortness of breath  Comparisons: 4/21/2024; 3/13/2024.  Technique: Axial CT images were obtained of the chest without contrast administration.  Reconstructed 2D coronal and sagittal images were also obtained. Automated exposure control and iterative construction methods were used.  Findings: There has been interval placement of a tunneled dialysis catheter (TDC) from what is thought to represent a right internal jugular (IJ) vein approach (since 3/13/2024) with its distal-most tip in the right atrium. Again, extensive postoperative changes of the left thorax are noted, seen previously, with suspected prior left upper lobectomy and chronic pleural-parenchymal scarring and volume loss. Calcified pleural plaques are seen. There may be round atelectasis and/or pleural-parenchymal scarring, as well, in the remaining inferior left lung. There is chronic leftward mediastinal shift. Pleural-parenchymal scarring is seen in the hyperexpanded right lung, especially in the posterior right upper lobe. These findings are similar to that seen previously (on 3/13/2024). There is a new air-fluid level in the right mainstem bronchus, which is nonspecific. It may represent mucus or other  retained secretion. Aspirated content cannot be excluded. No definite acute infiltrate is seen in the right lower lobe. There are stable subpleural opacities. No pneumothorax is seen. No pneumomediastinum. The small right pleural effusion has resolved. Minimal if any pleural effusion is appreciated currently. Probably no cardiac enlargement is present. No aneurysmal dilatation of the thoracic aorta. Interval evolution of the suspected right sided perinephric/subcapsular renal hematoma, probably accounting for the asymmetry in size of the partially imaged bilateral kidneys. Extensive atherosclerotic changes are seen, including involvement of the coronary arteries, thoracic aorta, abdominal aorta and its branches, and the great arteries. No thyroid enlargement. No enlarged axillary or supraclavicular lymph nodes. There are small mediastinal lymph nodes. Probably no enlarged mediastinal lymph nodes are seen.      Impression:  Interval placement of a right IJ tunneled dialysis catheter (TDC) is noted since 3/13/2024, as discussed.  There has been interval decrease in (if not complete resolution of) the small right pleural effusion since 3/13/2024.  There is an air-fluid level in the right mainstem bronchus. It may represent mucus. Aspirated content cannot be excluded.  No definite acute infiltrate.  Otherwise, no significant interval change is appreciated since the prior exam from 3/13/2024.    Electronically Signed By-Chicho Jones MD On:4/22/2024 9:33 PM         Assessment & Plan   Assessment / Plan     Active Hospital Problems:  Active Hospital Problems    Diagnosis     **Confusion     PNA (pneumonia)     History of recent pneumonia     ESRD (end stage renal disease)     Anemia of chronic disease          Impression:   Pseudomonas pneumonia, incompletely treated  Chest discomfort with likely bronchitis  Diastolic heart failure with acute exacerbation  COPD with acute exacerbation  Pseudomonas pneumonia  End-stage  renal disease on dialysis  History of smoking in past  Persistent cough  Viral bronchitis with human metapneumovirus  Therapeutic drug monitoring of antibiotics    Plan:   Continue cefepime  Continue JUAN nebs  Continue LABA LAMA inhaled corticosteroid      DVT prophylaxis:  Medical and mechanical DVT prophylaxis orders are present.        CODE STATUS:   Code Status (Patient has no pulse and is not breathing): CPR (Attempt to Resuscitate)  Medical Interventions (Patient has pulse or is breathing): Full Support      I personally reviewed pertinent labs, imaging and provider notes. Discussed with bedside nurse and will discuss with primary service.       Electronically signed by Regis Holcomb DO, 5/4/2024, 18:27 EDT.

## 2024-05-04 NOTE — PLAN OF CARE
Goal Outcome Evaluation:      Pt is alert and oriented X 4 and on 2L O2 PRN, pt states she mostly needs O2 after walking back from the bathroom. Pt is X 1 assist. All scheduled medications given as ordered. Pt complained of back pain during morning, Tylenol given. Pt complained of nausea towards end of shift, Zofran given via IV. Safety checks maintained with pt resting in bed, bed in lowest position, wheels to bed locked, and personal items and call light within reach. Family present most of the day visiting with patient.

## 2024-05-04 NOTE — PROGRESS NOTES
[facility]     Progress Note    Patient Name: Charlette Rai  : 1935  MRN: [id number]  Primary Care Physician:  Rafael Lyons MD  Date of admission: 2024      Subjective   Brief summary.  Patient admitted with weakness chest pain body aches.  Family requesting inpatient rehab    HPI:  Patient awake alert this morning no new complaints, occasional shortness of breath according to her.  No fever or chills    Review of Systems     Poor appetite, no chest pain no shortness of breath at rest.  No fever chills      Objective     Vitals:   Temp:  [97.7 °F (36.5 °C)-98.2 °F (36.8 °C)] 97.7 °F (36.5 °C)  Heart Rate:  [70-88] 77  Resp:  [12-20] 20  BP: (138-182)/(47-90) 160/55  Flow (L/min):  [2] 2    Physical Exam :     Elderly female not in acute distress.  HEENT unremarkable neck supple.  Heart regular.  Lungs diminished breath sounds, chronic crackles and wheezes at bilateral bases..  Abdomen soft.  Extremities no edema      Result Review:  I have personally reviewed the results from the time of this admission to 2024 07:46 EDT and agree with these findings:  []  Laboratory  []  Microbiology  []  Radiology  []  EKG/Telemetry   []  Cardiology/Vascular   []  Pathology  []  Old records  []  Other:           Assessment / Plan       Active Hospital Problems:  Active Hospital Problems    Diagnosis     **Confusion     History of recent pneumonia     ESRD (end stage renal disease)     Anemia of chronic disease        Plan:   Stable, continue antibiotic, continue hemodialysis.  Patient waiting for rehab placement,  s       DVT prophylaxis:  Medical and mechanical DVT prophylaxis orders are present.        CODE STATUS:   Code Status (Patient has no pulse and is not breathing): CPR (Attempt to Resuscitate)  Medical Interventions (Patient has pulse or is breathing): Full Support            Electronically signed by Rafael Lyons MD, 24, 7:47 AM EDT.  .

## 2024-05-04 NOTE — PROGRESS NOTES
[facility] Progress Note      Patient Name: Charlette Rai  : 1935  MRN: [id number]  Primary Care Physician:  Rafael Lyons MD  Date of admission: 2024    Subjective     Interval History:     Overnight no event  Patient underwent hemodialysis yesterday without any complications    Patient lying in bed comfortable    Shortness of breath at baseline  No chest pain or palpitations  Tolerating oral intake.  With decreased appetite    No fevers or chills    Review of Systems:   As noted above    Objective     Vitals:   Temp:  [97.7 °F (36.5 °C)-98.2 °F (36.8 °C)] 98.2 °F (36.8 °C)  Heart Rate:  [70-85] 77  Resp:  [12-20] 16  BP: (123-182)/(43-90) 123/43  Flow (L/min):  [2] 2    Intake/Output Summary (Last 24 hours) at 2024 1340  Last data filed at 2024 1200  Gross per 24 hour   Intake 720 ml   Output 2800 ml   Net -2080 ml       Physical Exam:    General Appearance: alert, oriented  no acute distress   Skin: warm and dry  HEENT: oral mucosa normal, nonicteric sclera  Neck: supple, no JVD TDC in place   Lungs: CTA  Heart: RRR, normal S1 and S2  Abdomen: soft, nontender, nondistended  : no palpable bladder  Extremities: no edema, cyanosis or clubbing LUE AVF .   Neuro: normal speech and mental status     Scheduled Meds:     arformoterol, 15 mcg, Nebulization, BID - RT  budesonide, 0.5 mg, Nebulization, BID - RT  carvedilol, 25 mg, Oral, BID With Meals  cefepime, 2,000 mg, Intravenous, Nightly  famotidine, 40 mg, Oral, Daily  hydrALAZINE, 25 mg, Oral, Q12H  isosorbide dinitrate, 20 mg, Oral, TID - Nitrates  losartan, 50 mg, Oral, Q24H  revefenacin, 175 mcg, Nebulization, Daily - RT  senna-docusate sodium, 2 tablet, Oral, BID  sodium chloride, 10 mL, Intravenous, Q12H  tobramycin PF, 300 mg, Nebulization, Q12H - RT      IV Meds:   Pharmacy to Dose Cefepime,         Results Reviewed:   I have personally reviewed the results from the time of this admission to 2024 13:40  EDT     Results from last 7 days   Lab Units 05/03/24  1351 05/01/24 1949 04/29/24  0533   SODIUM mmol/L 134* 136 137   POTASSIUM mmol/L 4.3 3.9 3.9   CHLORIDE mmol/L 100 102 106   CO2 mmol/L 25.0 24.6 21.0*   BUN mg/dL 23 10 41*   CREATININE mg/dL 3.21* 1.73* 3.51*   CALCIUM mg/dL 8.1* 8.1* 8.1*   BILIRUBIN mg/dL  --  0.2  --    ALK PHOS U/L  --  134*  --    ALT (SGPT) U/L  --  15  --    AST (SGOT) U/L  --  12  --    GLUCOSE mg/dL 141* 228* 81       Estimated Creatinine Clearance: 12.6 mL/min (A) (by C-G formula based on SCr of 3.21 mg/dL (H)).    Results from last 7 days   Lab Units 05/01/24 1949   MAGNESIUM mg/dL 1.7             Results from last 7 days   Lab Units 05/03/24  1351 05/01/24 1949 04/29/24  0533 04/28/24  0605   WBC 10*3/mm3 7.14 7.11 6.80 6.34   HEMOGLOBIN g/dL 8.9* 8.5* 8.4* 8.2*   PLATELETS 10*3/mm3 127* 123* 132* 153             Assessment / Plan     ASSESSMENT:    - ESRD on a MWF schedule at [facility],s/p  [facility]  ( steal syndrome  with LUE AVF ) ; she declined further intervention for now  - COPD exacerbation, recent viral bronchitis (human metapneumovirus), pseudomonas PNA -appreciate pulmonary recommendations  - Anemia of chronic kidney disease, receiving long-acting CASSANDRA as outpatient.   Follow H&H closely  - Diastolic dysfunction with LVH and moderate aortic stenosis.   .  - Hypertension with CKD  on carvedilol, ARB, hydralazine, room to titrate latter two if needed   - History of lung cancer 2005  --Diabetes Mellitus type 2 w/ complications.  Per primary team management  -- Transient confusion.  Per primary team back to baseline    PLAN:  Will continue to arrange for hemodialysis during her admission.  No acute indication for hemodialysis today  Will continue surveillance labs    Thank you for involving us in the care of Charlettegerry Rai.  Please feel free to call with any questions.    Kirk Medrano MD  05/04/24  13:40 EDT    [facility]  [phone]    Please note that portions of this note were completed with a voice recognition program.

## 2024-05-04 NOTE — PLAN OF CARE
Goal Outcome Evaluation:  Plan of Care Reviewed With: patient        Progress: no change  Outcome Evaluation: Pt. remains A & O X 4. VSS. Fistula noted to LUE. Up with one assist. Pt. is on HD, MWF. IV antibiotics administered per orders. No new concerns stated.

## 2024-05-05 PROCEDURE — 94664 DEMO&/EVAL PT USE INHALER: CPT

## 2024-05-05 PROCEDURE — 63710000001 REVEFENACIN 175 MCG/3ML SOLUTION: Performed by: INTERNAL MEDICINE

## 2024-05-05 PROCEDURE — 94799 UNLISTED PULMONARY SVC/PX: CPT

## 2024-05-05 PROCEDURE — 25010000002 CEFEPIME PER 500 MG: Performed by: INTERNAL MEDICINE

## 2024-05-05 PROCEDURE — 94761 N-INVAS EAR/PLS OXIMETRY MLT: CPT

## 2024-05-05 RX ADMIN — ARFORMOTEROL TARTRATE 15 MCG: 15 SOLUTION RESPIRATORY (INHALATION) at 07:28

## 2024-05-05 RX ADMIN — Medication 10 ML: at 21:29

## 2024-05-05 RX ADMIN — BUDESONIDE 0.5 MG: 0.5 SUSPENSION RESPIRATORY (INHALATION) at 07:28

## 2024-05-05 RX ADMIN — TRAMADOL HYDROCHLORIDE 50 MG: 50 TABLET ORAL at 09:32

## 2024-05-05 RX ADMIN — Medication 250 MG: at 21:29

## 2024-05-05 RX ADMIN — Medication 10 ML: at 09:22

## 2024-05-05 RX ADMIN — CEFEPIME 2000 MG: 2 INJECTION, POWDER, FOR SOLUTION INTRAVENOUS at 21:29

## 2024-05-05 RX ADMIN — ARFORMOTEROL TARTRATE 15 MCG: 15 SOLUTION RESPIRATORY (INHALATION) at 19:51

## 2024-05-05 RX ADMIN — ISOSORBIDE DINITRATE 20 MG: 20 TABLET ORAL at 18:13

## 2024-05-05 RX ADMIN — REVEFENACIN 175 MCG: 175 SOLUTION RESPIRATORY (INHALATION) at 07:28

## 2024-05-05 RX ADMIN — FAMOTIDINE 40 MG: 20 TABLET ORAL at 09:21

## 2024-05-05 RX ADMIN — Medication 250 MG: at 09:21

## 2024-05-05 RX ADMIN — TOBRAMYCIN 300 MG: 300 SOLUTION ORAL at 07:28

## 2024-05-05 RX ADMIN — BUDESONIDE 0.5 MG: 0.5 SUSPENSION RESPIRATORY (INHALATION) at 19:51

## 2024-05-05 RX ADMIN — TOBRAMYCIN 300 MG: 300 SOLUTION ORAL at 19:51

## 2024-05-05 NOTE — PLAN OF CARE
Goal Outcome Evaluation:  Plan of Care Reviewed With: patient        Progress: no change  Outcome Evaluation: Pt. has done well this shift. No bowel movement this shift. BP soft at times. No new concerns stated. IV antibiotic infused per orders.

## 2024-05-05 NOTE — PLAN OF CARE
Goal Outcome Evaluation:   Patient alert and oriented x4 throughout shift. 1LNC in place at this time. Sitting up chair at this time, visiting with family. Patient declined to wear SCDs due to them causing leg pain. Dialysis planned for tomorrow.

## 2024-05-05 NOTE — PROGRESS NOTES
[facility]     Progress Note    Patient Name: Charlette Rai  : 1935  MRN: [id number]  Primary Care Physician:  Rafael Lyons MD  Date of admission: 2024      Subjective   Brief summary.  Patient admitted with weakness chest pain body aches.  Family requesting inpatient rehab    HPI.  No new complaints, blood pressure fluctuating..  No chest pain or shortness of breath, complains of aches and pain and backache    Review of Systems     No fever chills, minimal cough, no shortness of breath today      Objective     Vitals:   Temp:  [97.1 °F (36.2 °C)-98.2 °F (36.8 °C)] 98.2 °F (36.8 °C)  Heart Rate:  [67-71] 68  Resp:  [16] 16  BP: (107-148)/(37-43) 148/39  Flow (L/min):  [1-2] 1    Physical Exam :     Elderly female not in acute distress.    HEENT unremarkable neck supple.  Heart regular.  Lungs diminished breath sounds, chronic crackles and wheezes at bilateral bases..  Tenderness on the L-spine and paraspinal area.  Abdomen soft.  Extremities no edema      Result Review:  I have personally reviewed the results from the time of this admission to 2024 12:30 EDT and agree with these findings:  [x]  Laboratory  []  Microbiology  [x]  Radiology  []  EKG/Telemetry   []  Cardiology/Vascular   []  Pathology  []  Old records  []  Other:           Assessment / Plan       Active Hospital Problems:  Active Hospital Problems    Diagnosis     **Confusion     PNA (pneumonia)     History of recent pneumonia     ESRD (end stage renal disease)     Anemia of chronic disease        Plan:   Patient stable tramadol restarted.  Adjust antihypertensives.  For hemodialysis  and Friday.  Waiting for placement       DVT prophylaxis:  Medical and mechanical DVT prophylaxis orders are present.        CODE STATUS:   Code Status (Patient has no pulse and is not breathing): CPR (Attempt to Resuscitate)  Medical Interventions (Patient has pulse or is breathing): Full Support            Electronically signed by  Rafael Lyons MD, 05/05/24, 12:32 PM EDT.    .

## 2024-05-05 NOTE — PROGRESS NOTES
[facility] Progress Note      Patient Name: Charlette Rai  : 1935  MRN: [id number]  Primary Care Physician:  Rafael Lyons MD  Date of admission: 2024    Subjective     Interval History:     Overnight no event    Patient lying in bed comfortable  Patient complaining of [place] and lower back discomfort  No chest pain or palpitations  Tolerating oral intake.  With decreased appetite    No fevers or chills    Review of Systems:   As noted above    Objective     Vitals:   Temp:  [97.1 °F (36.2 °C)-98.2 °F (36.8 °C)] 98.2 °F (36.8 °C)  Heart Rate:  [67-71] 68  Resp:  [16] 16  BP: (107-148)/(37-43) 148/39  Flow (L/min):  [1-2] 1    Intake/Output Summary (Last 24 hours) at 2024 1258  Last data filed at 2024 0925  Gross per 24 hour   Intake 480 ml   Output --   Net 480 ml       Physical Exam:    General Appearance: alert, oriented  no acute distress   Skin: warm and dry  HEENT: oral mucosa normal, nonicteric sclera  Neck: supple, no JVD TDC in place   Lungs: CTA  Heart: RRR, normal S1 and S2  Abdomen: soft, nontender, nondistended  : no palpable bladder  Extremities: no edema, cyanosis or clubbing LUE AVF .   Neuro: normal speech and mental status     Scheduled Meds:     arformoterol, 15 mcg, Nebulization, BID - RT  budesonide, 0.5 mg, Nebulization, BID - RT  carvedilol, 12.5 mg, Oral, BID With Meals  cefepime, 2,000 mg, Intravenous, Nightly  famotidine, 40 mg, Oral, Daily  hydrALAZINE, 25 mg, Oral, Q12H  isosorbide dinitrate, 20 mg, Oral, TID - Nitrates  losartan, 50 mg, Oral, Q24H  revefenacin, 175 mcg, Nebulization, Daily - RT  saccharomyces boulardii, 250 mg, Oral, BID  senna-docusate sodium, 2 tablet, Oral, BID  sodium chloride, 10 mL, Intravenous, Q12H  tobramycin PF, 300 mg, Nebulization, Q12H - RT      IV Meds:   Pharmacy to Dose Cefepime,         Results Reviewed:   I have personally reviewed the results from the time of this admission to 2024 12:58 EDT      Results from last 7 days   Lab Units 05/03/24  1351 05/01/24 1949 04/29/24  0533   SODIUM mmol/L 134* 136 137   POTASSIUM mmol/L 4.3 3.9 3.9   CHLORIDE mmol/L 100 102 106   CO2 mmol/L 25.0 24.6 21.0*   BUN mg/dL 23 10 41*   CREATININE mg/dL 3.21* 1.73* 3.51*   CALCIUM mg/dL 8.1* 8.1* 8.1*   BILIRUBIN mg/dL  --  0.2  --    ALK PHOS U/L  --  134*  --    ALT (SGPT) U/L  --  15  --    AST (SGOT) U/L  --  12  --    GLUCOSE mg/dL 141* 228* 81       Estimated Creatinine Clearance: 12.6 mL/min (A) (by C-G formula based on SCr of 3.21 mg/dL (H)).    Results from last 7 days   Lab Units 05/01/24 1949   MAGNESIUM mg/dL 1.7             Results from last 7 days   Lab Units 05/03/24  1351 05/01/24 1949 04/29/24  0533   WBC 10*3/mm3 7.14 7.11 6.80   HEMOGLOBIN g/dL 8.9* 8.5* 8.4*   PLATELETS 10*3/mm3 127* 123* 132*             Assessment / Plan     ASSESSMENT:    - ESRD on a MWF schedule at [facility],s/p  [facility]  ( steal syndrome  with LUE AVF ) ; she declined further intervention for now  - COPD exacerbation, recent viral bronchitis (human metapneumovirus), pseudomonas PNA -appreciate pulmonary recommendations  - Anemia of chronic kidney disease, receiving long-acting CASSANDRA as outpatient.   Follow H&H closely  - Diastolic dysfunction with LVH and moderate aortic stenosis.   .  - Hypertension with CKD  on carvedilol, ARB, hydralazine, room to titrate latter two if needed   - History of lung cancer 2005  --Diabetes Mellitus type 2 w/ complications.  Per primary team management  -- Transient confusion.  Per primary team ,back to baseline    PLAN:  Will arrange hemodialysis for tomorrow  The patient awaiting placement by care management team  Will continue surveillance labs    Thank you for involving us in the care of Charlette Rai.  Please feel free to call with any questions.    Kirk Medrano MD  05/05/24  12:58 EDT    [facility]  [phone]    Please note that portions of  this note were completed with a voice recognition program.

## 2024-05-06 PROBLEM — J15.1 PSEUDOMONAS PNEUMONIA: Status: ACTIVE | Noted: 2024-05-06

## 2024-05-06 LAB
BACTERIA SPEC AEROBE CULT: NORMAL
BACTERIA SPEC AEROBE CULT: NORMAL

## 2024-05-06 PROCEDURE — 99232 SBSQ HOSP IP/OBS MODERATE 35: CPT | Performed by: INTERNAL MEDICINE

## 2024-05-06 PROCEDURE — 63710000001 REVEFENACIN 175 MCG/3ML SOLUTION: Performed by: INTERNAL MEDICINE

## 2024-05-06 PROCEDURE — 94664 DEMO&/EVAL PT USE INHALER: CPT

## 2024-05-06 PROCEDURE — 94799 UNLISTED PULMONARY SVC/PX: CPT

## 2024-05-06 PROCEDURE — 25010000002 CEFEPIME PER 500 MG: Performed by: INTERNAL MEDICINE

## 2024-05-06 PROCEDURE — 25010000002 HEPARIN (PORCINE) PER 1000 UNITS: Performed by: INTERNAL MEDICINE

## 2024-05-06 RX ADMIN — REVEFENACIN 175 MCG: 175 SOLUTION RESPIRATORY (INHALATION) at 10:14

## 2024-05-06 RX ADMIN — TRAMADOL HYDROCHLORIDE 50 MG: 50 TABLET ORAL at 07:56

## 2024-05-06 RX ADMIN — CARVEDILOL 12.5 MG: 12.5 TABLET, FILM COATED ORAL at 18:30

## 2024-05-06 RX ADMIN — TOBRAMYCIN 300 MG: 300 SOLUTION ORAL at 18:28

## 2024-05-06 RX ADMIN — FAMOTIDINE 40 MG: 20 TABLET ORAL at 07:56

## 2024-05-06 RX ADMIN — ISOSORBIDE DINITRATE 20 MG: 20 TABLET ORAL at 13:29

## 2024-05-06 RX ADMIN — Medication 10 ML: at 08:02

## 2024-05-06 RX ADMIN — Medication 10 ML: at 20:49

## 2024-05-06 RX ADMIN — Medication 250 MG: at 07:58

## 2024-05-06 RX ADMIN — LOSARTAN POTASSIUM 50 MG: 50 TABLET, FILM COATED ORAL at 07:57

## 2024-05-06 RX ADMIN — CEFEPIME 2000 MG: 2 INJECTION, POWDER, FOR SOLUTION INTRAVENOUS at 20:49

## 2024-05-06 RX ADMIN — SENNOSIDES AND DOCUSATE SODIUM 2 TABLET: 50; 8.6 TABLET ORAL at 20:49

## 2024-05-06 RX ADMIN — Medication 250 MG: at 20:49

## 2024-05-06 RX ADMIN — CARVEDILOL 12.5 MG: 12.5 TABLET, FILM COATED ORAL at 07:58

## 2024-05-06 RX ADMIN — SENNOSIDES AND DOCUSATE SODIUM 2 TABLET: 50; 8.6 TABLET ORAL at 07:57

## 2024-05-06 RX ADMIN — ACETAMINOPHEN 650 MG: 325 TABLET ORAL at 13:31

## 2024-05-06 RX ADMIN — ARFORMOTEROL TARTRATE 15 MCG: 15 SOLUTION RESPIRATORY (INHALATION) at 10:14

## 2024-05-06 RX ADMIN — HEPARIN SODIUM 2400 UNITS: 1000 INJECTION INTRAVENOUS; SUBCUTANEOUS at 12:04

## 2024-05-06 RX ADMIN — ISOSORBIDE DINITRATE 20 MG: 20 TABLET ORAL at 18:30

## 2024-05-06 RX ADMIN — ARFORMOTEROL TARTRATE 15 MCG: 15 SOLUTION RESPIRATORY (INHALATION) at 18:28

## 2024-05-06 RX ADMIN — ISOSORBIDE DINITRATE 20 MG: 20 TABLET ORAL at 08:15

## 2024-05-06 RX ADMIN — BUDESONIDE 0.5 MG: 0.5 SUSPENSION RESPIRATORY (INHALATION) at 18:27

## 2024-05-06 RX ADMIN — BUDESONIDE 0.5 MG: 0.5 SUSPENSION RESPIRATORY (INHALATION) at 10:14

## 2024-05-06 NOTE — SIGNIFICANT NOTE
05/06/24 0819   OTHER   Discipline occupational therapist   Rehab Time/Intention   Session Not Performed patient unavailable for treatment  (off unit for dialysis)

## 2024-05-06 NOTE — PROGRESS NOTES
Pulmonary / Critical Care Progress Note      Patient Name: Charlette Rai  : 1935  MRN: [id number]  Primary Care Physician:  Rafael Lyons MD  Date of admission: 2024    Subjective   Subjective   Follow-up for Pseudomonas pneumonia, ESRD on dialysis, respiratory failure    Over past 24 hours: Admitted with worsening chest discomfort.  Started back on IV cefepime.  Started JUAN nebulizer.  Seen during dialysis  No acute events overnight.     Complaining of nausea  Complaining of diarrhea  Complaints above no worse than yesterday  Still feels very weak    Review of Systems  Positive for nausea  Positive for diarrhea        Objective   Objective     Vitals:   Temp:  [97.3 °F (36.3 °C)-98.6 °F (37 °C)] 97.7 °F (36.5 °C)  Heart Rate:  [66-89] 67  Resp:  [16-20] 16  BP: ()/(39-52) 130/44  Flow (L/min):  [1-3] 3  Physical Exam   Chronically ill-appearing female  Frail  Scattered crackles      Result Review    Result Review:  I have personally reviewed the results from the time of this admission to 2024 16:28 EDT and agree with these findings:  [x]  Laboratory  [x]  Microbiology  [x]  Radiology  [x]  EKG/Telemetry   [x]  Cardiology/Vascular   []  Pathology  []  Old records  []  Other:  Most notable findings include:         Lab 24  1351 24   WBC 7.14 7.11   HEMOGLOBIN 8.9* 8.5*   HEMATOCRIT 29.8* 28.1*   PLATELETS 127* 123*   SODIUM 134* 136   POTASSIUM 4.3 3.9   CHLORIDE 100 102   CO2 25.0 24.6   BUN 23 10   CREATININE 3.21* 1.73*   GLUCOSE 141* 228*   CALCIUM 8.1* 8.1*   TOTAL PROTEIN  --  5.2*   ALBUMIN  --  3.3*   GLOBULIN  --  1.9          CT Chest Without Contrast Diagnostic    Result Date: 2024  CT CHEST WO CONTRAST DIAGNOSTIC-  Date of Exam: 2024 7:49 PM  Indication: resp. failure; J44.1-h/o chronic obstructive pulmonary disease with (acute) exacerbation; SOA/SOB/shortness of air/shortness of breath  Comparisons: 2024; 3/13/2024.  Technique: Axial CT images were  obtained of the chest without contrast administration.  Reconstructed 2D coronal and sagittal images were also obtained. Automated exposure control and iterative construction methods were used.  Findings: There has been interval placement of a tunneled dialysis catheter (TDC) from what is thought to represent a right internal jugular (IJ) vein approach (since 3/13/2024) with its distal-most tip in the right atrium. Again, extensive postoperative changes of the left thorax are noted, seen previously, with suspected prior left upper lobectomy and chronic pleural-parenchymal scarring and volume loss. Calcified pleural plaques are seen. There may be round atelectasis and/or pleural-parenchymal scarring, as well, in the remaining inferior left lung. There is chronic leftward mediastinal shift. Pleural-parenchymal scarring is seen in the hyperexpanded right lung, especially in the posterior right upper lobe. These findings are similar to that seen previously (on 3/13/2024). There is a new air-fluid level in the right mainstem bronchus, which is nonspecific. It may represent mucus or other retained secretion. Aspirated content cannot be excluded. No definite acute infiltrate is seen in the right lower lobe. There are stable subpleural opacities. No pneumothorax is seen. No pneumomediastinum. The small right pleural effusion has resolved. Minimal if any pleural effusion is appreciated currently. Probably no cardiac enlargement is present. No aneurysmal dilatation of the thoracic aorta. Interval evolution of the suspected right sided perinephric/subcapsular renal hematoma, probably accounting for the asymmetry in size of the partially imaged bilateral kidneys. Extensive atherosclerotic changes are seen, including involvement of the coronary arteries, thoracic aorta, abdominal aorta and its branches, and the great arteries. No thyroid enlargement. No enlarged axillary or supraclavicular lymph nodes. There are small  mediastinal lymph nodes. Probably no enlarged mediastinal lymph nodes are seen.      Impression:  Interval placement of a right IJ tunneled dialysis catheter (TDC) is noted since 3/13/2024, as discussed.  There has been interval decrease in (if not complete resolution of) the small right pleural effusion since 3/13/2024.  There is an air-fluid level in the right mainstem bronchus. It may represent mucus. Aspirated content cannot be excluded.  No definite acute infiltrate.  Otherwise, no significant interval change is appreciated since the prior exam from 3/13/2024.    Electronically Signed By-Chicho Jones MD On:4/22/2024 9:33 PM         Assessment & Plan   Assessment / Plan     Active Hospital Problems:  Active Hospital Problems    Diagnosis     **Confusion     PNA (pneumonia)     History of recent pneumonia     ESRD (end stage renal disease)     Anemia of chronic disease          Impression:   Pseudomonas pneumonia, incompletely treated  Chest discomfort with likely bronchitis  Diastolic heart failure with acute exacerbation  COPD with acute exacerbation  Pseudomonas pneumonia  End-stage renal disease on dialysis  History of smoking in past  Persistent cough  Viral bronchitis with human metapneumovirus  Therapeutic drug monitoring of antibiotics    Plan:   Continue cefepime  Continue JUAN nebs  Continue LABA LAMA inhaled corticosteroid  Will need to be discharged on tobramycin nebs as well as chest vest    DVT prophylaxis:  Medical and mechanical DVT prophylaxis orders are present.        CODE STATUS:   Code Status (Patient has no pulse and is not breathing): CPR (Attempt to Resuscitate)  Medical Interventions (Patient has pulse or is breathing): Full Support      I personally reviewed pertinent labs, imaging and provider notes. Discussed with bedside nurse and will discuss with primary service.       Electronically signed by Regis Holcomb DO, 5/6/2024, 16:28 EDT.

## 2024-05-06 NOTE — NURSING NOTE
Duration of Treatment 3.5 Hours   Pt ran entire tx     Access Site Tunneled Dialysis Catheter  No issues with Catheter   Dialyzer Revaclear    mL/min   Dialysate Temperature (C) 36   BFR-As tolerated to a maximum of: 400 mL/min Pt ran max Bfr   Dialysate Solution Bath: K+ = 2 mEq, Ca = 2.5mEq   Bicarb 35 mEq   Na+ 137 meq   Fluid Removal: 2     Pt removed 2 liters   Pt tolerated tx well pt voice no issues no issues noted. Pt post b/p was 153/51 Hr 70. Pt blood was returned without complications. Pt removed 2 liters of fluid. Report was given to Falguni KHAN.

## 2024-05-06 NOTE — NURSING NOTE
Spoke with Dr Aviles and advised pts manual BP 90/40. Informed MD of current blood pressure and diuretic meds. MD ordered to D/C Hydralazine. Pt informed new order.

## 2024-05-06 NOTE — PROGRESS NOTES
[facility]     Progress Note    Patient Name: Charlette Rai  : 1935  MRN: [id number]  Primary Care Physician:  Rafael Lyons MD  Date of admission: 2024      Subjective   Brief summary.  Patient admitted with weakness chest pain body aches.  Further diagnosed with Pseudomonas pneumonia completely treated.  Family requesting inpatient rehab    HPI.  Remains weak, tired fatigue, some shortness of breath, blood pressure fluctuating.  Anxiety.    Review of Systems     Denies any fever or chills, appetite poor.  Fatigue.  No cough.    Objective     Vitals:   Temp:  [97.3 °F (36.3 °C)-98.6 °F (37 °C)] 97.7 °F (36.5 °C)  Heart Rate:  [66-89] 67  Resp:  [16-20] 18  BP: ()/(40-52) 130/44  Flow (L/min):  [1-3] 1    Physical Exam :     Elderly female not in acute distress.    .  Heart regular.  Lungs diminished breath sounds, chronic crackles and wheezes at bilateral bases..  Tenderness on the L-spine and paraspinal area.  Abdomen soft.  Extremities no edema      Result Review:  I have personally reviewed the results from the time of this admission to 2024 19:29 EDT and agree with these findings:  [x]  Laboratory  []  Microbiology  [x]  Radiology  []  EKG/Telemetry   []  Cardiology/Vascular   []  Pathology  []  Old records  []  Other:       Reviewed    Assessment / Plan       Active Hospital Problems:  Active Hospital Problems    Diagnosis     **Confusion     Pseudomonas pneumonia     PNA (pneumonia)     History of recent pneumonia     ESRD (end stage renal disease)     Anemia of chronic disease        Plan:   Patient on IV antibiotics per pulmonologist.  Continue treatment.  Continue dialysis.  Waiting for placement       DVT prophylaxis:  Medical and mechanical DVT prophylaxis orders are present.        CODE STATUS:   Code Status (Patient has no pulse and is not breathing): CPR (Attempt to Resuscitate)  Medical Interventions (Patient has pulse or is breathing): Full  Support              Electronically signed by Rafael Lyons MD, 05/06/24, 7:29 PM EDT.    .

## 2024-05-06 NOTE — PLAN OF CARE
Goal Outcome Evaluation:         Patient alert and oriented x 4.  Patient in a calm, cooperative, pleasant,mood this shift. Patient compliant with all medications this shift.  VS WDL.  Lungs coarse and diminished throughout without s/sx of distress.  Pox 100% on 1L NC. Patient complained of chronic back pain to lower back and received pain medication which patient stated was effective.  Patient attended dialysis this shift and tolerated well.  Family at bedside with patient this afternoon.  Patient sitting upright in bed with call light in reach.

## 2024-05-06 NOTE — PROGRESS NOTES
[facility] Progress Note      Patient Name: Charlette Rai  : 1935  MRN: [id number]  Primary Care Physician:  Rafael Lyons MD  Date of admission: 2024    Subjective     Interval History:     Overnight no event, seen earlier this morning while receiving dialysis.  Overall feeling well.  No confusion, not short of breath, still with intermittent cough but little sputum production.    Review of Systems:   As noted above    Objective     Vitals:   Temp:  [97.3 °F (36.3 °C)-98.6 °F (37 °C)] 97.7 °F (36.5 °C)  Heart Rate:  [66-89] 67  Resp:  [16-20] 16  BP: ()/(39-52) 130/44  Flow (L/min):  [1-3] 3    Intake/Output Summary (Last 24 hours) at 2024 1624  Last data filed at 2024 1230  Gross per 24 hour   Intake 300 ml   Output 2000 ml   Net -1700 ml       Physical Exam:    General Appearance: alert, oriented  no acute distress   Skin: warm and dry  HEENT: oral mucosa normal, nonicteric sclera  Neck: supple, no JVD, TDC in place   Lungs: CTA  Heart: RRR, normal S1 and S2  Abdomen: soft, nontender, nondistended  : no palpable bladder  Extremities: no edema, cyanosis or clubbing LUE AVF .   Neuro: normal speech and mental status     Scheduled Meds:     arformoterol, 15 mcg, Nebulization, BID - RT  budesonide, 0.5 mg, Nebulization, BID - RT  carvedilol, 12.5 mg, Oral, BID With Meals  cefepime, 2,000 mg, Intravenous, Nightly  famotidine, 40 mg, Oral, Daily  isosorbide dinitrate, 20 mg, Oral, TID - Nitrates  losartan, 50 mg, Oral, Q24H  revefenacin, 175 mcg, Nebulization, Daily - RT  saccharomyces boulardii, 250 mg, Oral, BID  senna-docusate sodium, 2 tablet, Oral, BID  sodium chloride, 10 mL, Intravenous, Q12H  tobramycin PF, 300 mg, Nebulization, Q12H - RT      IV Meds:   Pharmacy to Dose Cefepime,         Results Reviewed:   I have personally reviewed the results from the time of this admission to 2024 16:24 EDT     Results from last 7 days   Lab Units  05/03/24  1351 05/01/24 1949   SODIUM mmol/L 134* 136   POTASSIUM mmol/L 4.3 3.9   CHLORIDE mmol/L 100 102   CO2 mmol/L 25.0 24.6   BUN mg/dL 23 10   CREATININE mg/dL 3.21* 1.73*   CALCIUM mg/dL 8.1* 8.1*   BILIRUBIN mg/dL  --  0.2   ALK PHOS U/L  --  134*   ALT (SGPT) U/L  --  15   AST (SGOT) U/L  --  12   GLUCOSE mg/dL 141* 228*       Estimated Creatinine Clearance: 12.6 mL/min (A) (by C-G formula based on SCr of 3.21 mg/dL (H)).    Results from last 7 days   Lab Units 05/01/24 1949   MAGNESIUM mg/dL 1.7             Results from last 7 days   Lab Units 05/03/24  1351 05/01/24 1949   WBC 10*3/mm3 7.14 7.11   HEMOGLOBIN g/dL 8.9* 8.5*   PLATELETS 10*3/mm3 127* 123*             Assessment / Plan     ASSESSMENT:    - ESRD on a MWF schedule at [facility],s/p  [facility]  ( steal syndrome  with LUE AVF ) ; she declined further intervention for now  - COPD exacerbation, recent viral bronchitis (human metapneumovirus), pseudomonas PNA, on nebulized tobramycin and cefepime, per pulmonary.  - Anemia of chronic kidney disease, receiving long-acting CASSANDRA as outpatient.   Follow H&H closely  - Diastolic dysfunction with LVH and moderate aortic stenosis.   .  - Hypertension with CKD  on carvedilol, ARB, hydralazine, room to titrate latter two if needed   - History of lung cancer 2005  --Diabetes Mellitus type 2 w/ complications.  Per primary team management  --Transient confusion.  Per primary team, back to baseline    PLAN:  Continue dialysis 3 times a week, ultrafiltration as tolerated.  The patient awaiting placement by care management team  Will continue surveillance labs  Add Novasource renal twice daily with meals.  Discussed with primary and dialysis staff.    Edi García MD  05/06/24  16:24 EDT    [facility]  [phone]

## 2024-05-06 NOTE — PLAN OF CARE
Goal Outcome Evaluation:  Plan of Care Reviewed With: patient        Progress: no change  Outcome Evaluation: Pt alert and able to make needs known. No c/o pain or discomfort. Pt cont. IV antibiotics, tolerating well.  Pt to have dialysis today. Pt waiting on rehab placement.

## 2024-05-06 NOTE — THERAPY EVALUATION
Respiratory Therapist Broncho-Pulmonary Hygiene Progress Note      Patient Name:  Charlette Rai  YOB: 1935    Charlette Rai meets the qualification for Level 3 of the Bronco-Pulmonary Hygiene Protocol. This was based on my daily patient assessment and includes review of chest x-ray results, cough ability and quality, oxygenation, secretions or risk for secretion development and patient mobility.     Broncho-Pulmonary Hygiene Assessment:    Level of Movement: Low level of mobility  Lethargic uncoorperative    Breath Sounds: Bilateral localized decreased air exchange and/or coarse rhonchi    Cough: Ineffective, weak, frequent    Chest X-Ray: Mild consolidation and/or atelectasis.    Sputum Productions: Able to produce small to moderate amount, of moderately thick secretions    History and Physical: Chronic condition    SpO2 to Oxygen Need: greater than 92% on room air or  less than 3L nasal canula    Current SpO2 is: 99 on 3lpm    Based on this information, I have completed the following interventions: MetaNeb       Electronically signed by David Matthews RRT, 05/06/24, 9:52 AM EDT.

## 2024-05-07 VITALS
HEART RATE: 85 BPM | OXYGEN SATURATION: 97 % | SYSTOLIC BLOOD PRESSURE: 161 MMHG | DIASTOLIC BLOOD PRESSURE: 90 MMHG | BODY MASS INDEX: 23.76 KG/M2 | HEIGHT: 65 IN | TEMPERATURE: 98.2 F | WEIGHT: 142.64 LBS | RESPIRATION RATE: 18 BRPM

## 2024-05-07 PROBLEM — R41.0 CONFUSION: Status: RESOLVED | Noted: 2024-05-02 | Resolved: 2024-05-07

## 2024-05-07 LAB — HBV SURFACE AG SERPL QL IA: NORMAL

## 2024-05-07 PROCEDURE — 87340 HEPATITIS B SURFACE AG IA: CPT | Performed by: INTERNAL MEDICINE

## 2024-05-07 PROCEDURE — 94664 DEMO&/EVAL PT USE INHALER: CPT

## 2024-05-07 PROCEDURE — 63710000001 REVEFENACIN 175 MCG/3ML SOLUTION: Performed by: INTERNAL MEDICINE

## 2024-05-07 PROCEDURE — 94761 N-INVAS EAR/PLS OXIMETRY MLT: CPT

## 2024-05-07 PROCEDURE — 94799 UNLISTED PULMONARY SVC/PX: CPT

## 2024-05-07 RX ORDER — LEVOFLOXACIN 250 MG/1
250 TABLET, FILM COATED ORAL DAILY
Qty: 7 TABLET | Refills: 0 | Status: SHIPPED | OUTPATIENT
Start: 2024-05-07 | End: 2024-05-14

## 2024-05-07 RX ORDER — TRAMADOL HYDROCHLORIDE 50 MG/1
50 TABLET ORAL EVERY 8 HOURS PRN
Qty: 30 TABLET | Refills: 0 | Status: SHIPPED | OUTPATIENT
Start: 2024-05-07 | End: 2024-05-17

## 2024-05-07 RX ORDER — TOBRAMYCIN INHALATION SOLUTION 300 MG/5ML
300 INHALANT RESPIRATORY (INHALATION)
Qty: 300 ML | Refills: 0 | Status: SHIPPED | OUTPATIENT
Start: 2024-05-07 | End: 2024-06-06

## 2024-05-07 RX ORDER — CARVEDILOL 12.5 MG/1
12.5 TABLET ORAL 2 TIMES DAILY WITH MEALS
Qty: 60 TABLET | Refills: 0 | Status: SHIPPED | OUTPATIENT
Start: 2024-05-07 | End: 2024-06-06

## 2024-05-07 RX ORDER — SACCHAROMYCES BOULARDII 250 MG
250 CAPSULE ORAL 2 TIMES DAILY
Qty: 24 CAPSULE | Refills: 0 | Status: SHIPPED | OUTPATIENT
Start: 2024-05-07 | End: 2024-05-19

## 2024-05-07 RX ORDER — ARFORMOTEROL TARTRATE 15 UG/2ML
15 SOLUTION RESPIRATORY (INHALATION)
Qty: 120 ML | Refills: 0 | Status: SHIPPED | OUTPATIENT
Start: 2024-05-07 | End: 2024-06-06

## 2024-05-07 RX ADMIN — BUDESONIDE 0.5 MG: 0.5 SUSPENSION RESPIRATORY (INHALATION) at 09:28

## 2024-05-07 RX ADMIN — FAMOTIDINE 40 MG: 20 TABLET ORAL at 09:03

## 2024-05-07 RX ADMIN — Medication 250 MG: at 09:04

## 2024-05-07 RX ADMIN — Medication 10 ML: at 09:02

## 2024-05-07 RX ADMIN — ARFORMOTEROL TARTRATE 15 MCG: 15 SOLUTION RESPIRATORY (INHALATION) at 09:28

## 2024-05-07 RX ADMIN — ISOSORBIDE DINITRATE 20 MG: 20 TABLET ORAL at 09:03

## 2024-05-07 RX ADMIN — REVEFENACIN 175 MCG: 175 SOLUTION RESPIRATORY (INHALATION) at 09:28

## 2024-05-07 RX ADMIN — TOBRAMYCIN 300 MG: 300 SOLUTION ORAL at 09:28

## 2024-05-07 RX ADMIN — ISOSORBIDE DINITRATE 20 MG: 20 TABLET ORAL at 14:23

## 2024-05-07 RX ADMIN — TRAMADOL HYDROCHLORIDE 50 MG: 50 TABLET ORAL at 13:36

## 2024-05-07 NOTE — DISCHARGE SUMMARY
[facility]         DISCHARGE SUMMARY    Patient Name: Charlette Rai  : 1935  MRN: [id number]    Date of Admission: 2024  Date of Discharge: May 7  Primary Care Physician: Rafael Lyons MD    Consults       Date and Time Order Name Status Description    5/3/2024 10:00 AM Inpatient Nephrology Consult      5/3/2024 10:00 AM Inpatient Nephrology Consult Completed     2024  2:50 PM Inpatient Pulmonology Consult Completed     2024  2:09 AM General MD Inpatient Consult      2024 11:14 AM Inpatient Vascular Surgery Consult Completed     2024  7:51 PM Inpatient Nephrology Consult Completed     4/15/2024  7:53 AM Inpatient Nephrology Consult Completed             Presenting Problem:   Confusion [R41.0]  Weakness [R53.1]  Chronic anemia [D64.9]  Chronic obstructive pulmonary disease, unspecified COPD type [J44.9]  Chronic midline thoracic back pain [M54.6, G89.29]  Chronic kidney disease, unspecified CKD stage [N18.9]  PNA (pneumonia) [J18.9]    Active and Resolved Hospital Problems:  Active Hospital Problems    Diagnosis POA    Pseudomonas pneumonia [J15.1] Unknown    PNA (pneumonia) [J18.9] Yes    ESRD (end stage renal disease) [N18.6] Yes    Anemia of chronic disease [D63.8] Yes      Resolved Hospital Problems    Diagnosis POA    **Confusion [R41.0] Yes         Hospital Course     Hospital Course:  Charlette Rai is a 89 y.o. female with known history of end-stage renal disease, anemia, chronic kidney disease, history of lung cancer admitted to hospital for chest discomfort cough weakness and confusion.  Patient has recurrent admission to hospital last 3 months with approximately 6-7 admissions.  Patient has been going home coming back with nonspecific complaints, last admission she was diagnosed with Pseudomonas pneumonia and she was sent home on oral antibiotics after several days of IV antibiotics at hospital.    Patient was brought in this time and family requested to  keep her in hospital because of chest discomfort.  Patient workup was essentially negative in ER and ER physician recommended discharging to home but patient's family declined.    Patient has been declining nursing home and inpatient rehab although she has chronic illnesses and end-stage renal disease with recurrent admissions.  This time we had detailed discussion and she agreed for inpatient rehab for a week or 2.  Although she probably would benefit from permanent inpatient nursing home stay.    Patient did fairly well in the hospital no acute issues were noted she was continued on dialysis pulmonary saw patient and recommended antibiotics and continuation for her recent Pseudomonas pneumonia.    She got approved for inpatient rehab and she will be discharged to nursing home today.        DISCHARGE Follow Up Recommendations for labs and diagnostics:   Discharged to nursing home  Monitor labs including thyroid profile next week  Also to have hemodialysis Monday Wednesday and Friday    Day of Discharge     Vital Signs:  Temp:  [97.7 °F (36.5 °C)-98.2 °F (36.8 °C)] 98.2 °F (36.8 °C)  Heart Rate:  [74-85] 85  Resp:  [16-18] 18  BP: (133-176)/(45-90) 161/90  Flow (L/min):  [1] 1    Physical Exam:    Elderly female not in acute distress.  Heart regular.  Lungs coarse breath sounds with occasional crackles bilaterally.  Abdomen soft.  Extremities no edema      Pertinent  and/or Most Recent Results     LAB RESULTS:      Lab 05/03/24 1351 05/01/24 1949   WBC 7.14 7.11   HEMOGLOBIN 8.9* 8.5*   HEMATOCRIT 29.8* 28.1*   PLATELETS 127* 123*   NEUTROS ABS 6.24 5.71   IMMATURE GRANS (ABS) 0.05 0.04   LYMPHS ABS 0.35* 0.53*   MONOS ABS 0.40 0.76   EOS ABS 0.08 0.05   .1* 101.1*   LACTATE  --  1.8         Lab 05/03/24  1351 05/01/24 1949   SODIUM 134* 136   POTASSIUM 4.3 3.9   CHLORIDE 100 102   CO2 25.0 24.6   ANION GAP 9.0 9.4   BUN 23 10   CREATININE 3.21* 1.73*   EGFR 13.5* 28.3*   GLUCOSE 141* 228*   CALCIUM 8.1*  8.1*   MAGNESIUM  --  1.7   TSH  --  3.630         Lab 05/01/24 1949   TOTAL PROTEIN 5.2*   ALBUMIN 3.3*   GLOBULIN 1.9   ALT (SGPT) 15   AST (SGOT) 12   BILIRUBIN 0.2   ALK PHOS 134*         Lab 05/01/24  2219 05/01/24 1949   HSTROP T 68* 67*                 Brief Urine Lab Results  (Last result in the past 365 days)        Color   Clarity   Blood   Leuk Est   Nitrite   Protein   CREAT   Urine HCG        05/01/24 2102 Dark Yellow   Cloudy   Negative   Small (1+)   Negative   >=300 mg/dL (3+)                 Microbiology Results (last 10 days)       Procedure Component Value - Date/Time    Blood Culture - Blood, Arm, Left [[id number]]  (Normal) Collected: 05/01/24 1949    Lab Status: Final result Specimen: Blood from Arm, Left Updated: 05/06/24 2015     Blood Culture No growth at 5 days    Blood Culture - Blood, Arm, Right [[id number]]  (Normal) Collected: 05/01/24 1949    Lab Status: Final result Specimen: Blood from Arm, Right Updated: 05/06/24 2015     Blood Culture No growth at 5 days            PROCEDURES:    XR Spine Thoracic 3 View    Result Date: 5/2/2024  Impression: Degenerative changes in the thoracic spine. No acute compression fractures. Alignment is stable.    Electronically Signed By-Dr. Weston Castillo MD On:5/2/2024 1:20 AM      XR Chest 1 View    Result Date: 5/1/2024  Impression: Impression: Stable exam. Stable chronic appearing changes in the left hemithorax and right midlung field.   Electronically Signed By-YARELIS LUCAS MD On:5/1/2024 8:29 PM      CT Chest Without Contrast Diagnostic    Result Date: 4/22/2024  Impression:  Interval placement of a right IJ tunneled dialysis catheter (TDC) is noted since 3/13/2024, as discussed.  There has been interval decrease in (if not complete resolution of) the small right pleural effusion since 3/13/2024.  There is an air-fluid level in the right mainstem bronchus. It may represent mucus. Aspirated content cannot be excluded.  No definite acute  infiltrate.  Otherwise, no significant interval change is appreciated since the prior exam from 3/13/2024.    Electronically Signed By-Chicho Jones MD On:4/22/2024 9:33 PM      XR Chest 1 View    Result Date: 4/21/2024  Impression: Impression: No significant change in comparison to 4/17/2024, as above.   Electronically Signed By-SAVI LUCERO MD On:4/21/2024 1:48 PM      XR Chest 1 View    Result Date: 4/17/2024  Impression: Impression: No change from previous study. Chronic changes of the chest with post operative changes in the left thorax.   Electronically Signed By-Kimberli Alvarado MD On:4/17/2024 7:41 PM      XR Chest 1 View    Result Date: 4/14/2024  Impression: Stable appearance of the chest with no definite acute findings.  Electronically Signed By-Dr. Weston Castillo MD On:4/14/2024 10:38 PM       Results for orders placed during the hospital encounter of 03/13/24    Duplex Venous Lower Extremity - Right CAR    Interpretation Summary    Normal right lower extremity venous duplex scan.      Results for orders placed during the hospital encounter of 03/13/24    Duplex Venous Lower Extremity - Right CAR    Interpretation Summary    Normal right lower extremity venous duplex scan.      Results for orders placed during the hospital encounter of 10/29/23    Adult Transthoracic Echo Complete W/ Cont if Necessary Per Protocol    Interpretation Summary    Left ventricular systolic function is normal. Calculated left ventricular EF = 56.8%    Left ventricular wall thickness is consistent with mild asymmetric hypertrophy.    Left ventricular diastolic function is consistent with (grade I) impaired relaxation.    Left atrial volume is moderately increased.    Moderate aortic valve stenosis is present.    Estimated right ventricular systolic pressure from tricuspid regurgitation is normal (<35 mmHg).    Mild dilation of the aortic root is present.      Labs Pending at Discharge:        Discharge Details        Discharge  Medications        New Medications        Instructions Start Date   arformoterol 15 MCG/2ML nebulizer solution  Commonly known as: BROVANA   15 mcg, Nebulization, 2 Times Daily - RT      revefenacin 175 MCG/3ML nebulizer solution  Commonly known as: YUPELRI   175 mcg, Nebulization, Daily - RT   Start Date: May 8, 2024     saccharomyces boulardii 250 MG capsule  Commonly known as: FLORASTOR   250 mg, Oral, 2 Times Daily      tobramycin  MG/5ML nebulizer solution  Commonly known as: Moy   300 mg, Nebulization, 2 Times Daily - RT, Cycle 30 days on, then 30 days off.             Changes to Medications        Instructions Start Date   carvedilol 12.5 MG tablet  Commonly known as: COREG  What changed:   medication strength  how much to take   12.5 mg, Oral, 2 Times Daily With Meals      levocetirizine 5 MG tablet  Commonly known as: XYZAL  What changed: when to take this   TAKE 1 TABLET DAILY             Continue These Medications        Instructions Start Date   albuterol sulfate  (90 Base) MCG/ACT inhaler  Commonly known as: PROVENTIL HFA;VENTOLIN HFA;PROAIR HFA   2 puffs, Inhalation, Every 4 Hours PRN      aspirin 81 MG EC tablet   81 mg, Oral, Daily      budesonide-formoterol 160-4.5 MCG/ACT inhaler  Commonly known as: SYMBICORT   2 puffs, Inhalation, 2 Times Daily - RT      Cozaar 25 MG tablet  Generic drug: losartan   2 tablets, Oral, Every 24 Hours      dexlansoprazole 60 MG capsule  Commonly known as: DEXILANT   60 mg, Oral, Daily      ipratropium-albuterol 0.5-2.5 mg/3 ml nebulizer  Commonly known as: DUO-NEB   3 mL, Nebulization, Every 4 Hours PRN      isosorbide dinitrate 20 MG tablet  Commonly known as: ISORDIL   20 mg, Oral, 3 Times Daily (Nitrates)      levoFLOXacin 250 MG tablet  Commonly known as: Levaquin   250 mg, Oral, Daily      linagliptin 5 MG tablet tablet  Commonly known as: Tradjenta   5 mg, Oral, Daily      nitroglycerin 0.4 MG SL tablet  Commonly known as: NITROSTAT   Place 1  tablet under the tongue Every 5 (Five) Minutes As Needed for Chest Pain.      rosuvastatin 20 MG tablet  Commonly known as: CRESTOR   20 mg, Oral, Daily      sevelamer 800 MG tablet  Commonly known as: RENVELA   800 mg, Oral, 3 Times Daily With Meals      traMADol 50 MG tablet  Commonly known as: ULTRAM   50 mg, Oral, Every 8 Hours PRN      Trulicity 1.5 MG/0.5ML solution pen-injector  Generic drug: Dulaglutide   Inject 1.5 mg under the skin into the appropriate area as directed Every 14 (Fourteen) Days.             Stop These Medications      ergocalciferol 1.25 MG (19591 UT) capsule  Commonly known as: ERGOCALCIFEROL     hydrALAZINE 25 MG tablet  Commonly known as: APRESOLINE     predniSONE 10 MG tablet  Commonly known as: DELTASONE              No Known Allergies      Discharge Disposition:    Rehab Facility or Unit (DC - External)    Diet:    Heart healthy, renal, diabetic diet    Discharge Activity:     Activity Instructions       Activity as Tolerated              Future Appointments   Date Time Provider Department Center   5/16/2024  3:15 PM Jazzy Addison APRN [facility]       Additional Instructions for the Follow-ups that You Need to Schedule       Discharge Follow-up with PCP   As directed       Currently Documented PCP:    Rafael Lyons MD    PCP Phone Number:    [phone]     Follow Up Details: Post discharge from nursing home        Discharge Follow-up with Specified Provider: Dr. García for hemodialysis post discharge from nursing home   As directed      To: Dr. García for hemodialysis post discharge from nursing home                Time spent on Discharge including face to face service: 36 minutes.            I have dictated this note utilizing Dragon Dictation.             Please note that portions of this note were completed with a voice recognition program.             Part of this note may be an electronic transcription/translation of spoken language to printed text         using the  Dragon Dictation System.       Electronically signed by Rafael Lyons MD, 05/07/24, 4:43 PM EDT.

## 2024-05-07 NOTE — PLAN OF CARE
Goal Outcome Evaluation:      Pt A O x 4. Pt medicated for pain once this shift. Safety check performed throughtout shift. No new concerns during shift

## 2024-05-07 NOTE — PLAN OF CARE
Goal Outcome Evaluation:  Plan of Care Reviewed With: patient        Progress: no change  Outcome Evaluation: Pt alert and able to make needs known. No c/o pain or discomfort. Pt urinating without difficulty. Pt cont. on IV antibiotics-tolerating well. Pt precert pending for rehab placement.

## 2024-05-07 NOTE — PROGRESS NOTES
[facility] Progress Note      Patient Name: Charlette Rai  : 1935  MRN: [id number]  Primary Care Physician:  Rafael Lyons MD  Date of admission: 2024    Subjective     Interval History:   Overnight no events  Overall feeling well.  No confusion, not short of breath.    Review of Systems:   As noted above    Objective     Vitals:   Temp:  [97.7 °F (36.5 °C)-98.6 °F (37 °C)] 97.7 °F (36.5 °C)  Heart Rate:  [67-84] 76  Resp:  [16-18] 18  BP: (119-176)/(44-53) 176/49  Flow (L/min):  [1] 1    Intake/Output Summary (Last 24 hours) at 2024 1038  Last data filed at 2024 0848  Gross per 24 hour   Intake 460 ml   Output 2000 ml   Net -1540 ml       Physical Exam:    General Appearance: alert, oriented  no acute distress   Skin: warm and dry  HEENT: oral mucosa normal, nonicteric sclera  Neck: supple, no JVD, TDC in place   Lungs: CTA  Heart: RRR, normal S1 and S2  Abdomen: soft, nontender, nondistended  : no palpable bladder  Extremities: no edema, cyanosis or clubbing LUE AVF .   Neuro: normal speech and mental status     Scheduled Meds:     arformoterol, 15 mcg, Nebulization, BID - RT  budesonide, 0.5 mg, Nebulization, BID - RT  carvedilol, 12.5 mg, Oral, BID With Meals  famotidine, 40 mg, Oral, Daily  isosorbide dinitrate, 20 mg, Oral, TID - Nitrates  losartan, 50 mg, Oral, Q24H  revefenacin, 175 mcg, Nebulization, Daily - RT  saccharomyces boulardii, 250 mg, Oral, BID  senna-docusate sodium, 2 tablet, Oral, BID  sodium chloride, 10 mL, Intravenous, Q12H  tobramycin PF, 300 mg, Nebulization, Q12H - RT      IV Meds:          Results Reviewed:   I have personally reviewed the results from the time of this admission to 2024 10:38 EDT     Results from last 7 days   Lab Units 24  1351 05/01/24  1949   SODIUM mmol/L 134* 136   POTASSIUM mmol/L 4.3 3.9   CHLORIDE mmol/L 100 102   CO2 mmol/L 25.0 24.6   BUN mg/dL 23 10   CREATININE mg/dL 3.21* 1.73*   CALCIUM mg/dL  8.1* 8.1*   BILIRUBIN mg/dL  --  0.2   ALK PHOS U/L  --  134*   ALT (SGPT) U/L  --  15   AST (SGOT) U/L  --  12   GLUCOSE mg/dL 141* 228*       Estimated Creatinine Clearance: 12.6 mL/min (A) (by C-G formula based on SCr of 3.21 mg/dL (H)).    Results from last 7 days   Lab Units 05/01/24  1949   MAGNESIUM mg/dL 1.7             Results from last 7 days   Lab Units 05/03/24  1351 05/01/24  1949   WBC 10*3/mm3 7.14 7.11   HEMOGLOBIN g/dL 8.9* 8.5*   PLATELETS 10*3/mm3 127* 123*             Assessment / Plan     ASSESSMENT:  - ESRD on a MWF schedule at [facility] [place],[facility]/p  [facility]  ( steal syndrome  with LUE AVF ) ; she declined further intervention for now  - COPD exacerbation, recent viral bronchitis (human metapneumovirus), pseudomonas PNA, on nebulized tobramycin and cefepime, per pulmonary.  - Anemia of chronic kidney disease, receiving long-acting CASSANDRA as outpatient.   Follow H&H closely  - Diastolic dysfunction with LVH and moderate aortic stenosis.   .  - Hypertension with CKD  on carvedilol, ARB, hydralazine, room to titrate latter two if needed   - History of lung cancer 2005  --Diabetes Mellitus type 2 w/ complications.  Per primary team management  --Transient confusion.  Per primary team, back to baseline    Elliott Aviles MD  05/07/24  10:38 EDT    [facility]  [phone]

## 2024-05-08 LAB
MYCOBACTERIUM SPEC CULT: NORMAL
MYCOBACTERIUM SPEC CULT: NORMAL
NIGHT BLUE STAIN TISS: NORMAL

## 2024-05-09 NOTE — PROGRESS NOTES
"Enter Query Response Below      Query Response: Elevated troponin type II MI             If applicable, please update the problem list.   Patient: Charlette Rai        : 1935  Account: [id number]           Admit Date: 2024        How to Respond to this query:       a. Click New Note     b. Answer query within the yellow box.                c. Update the Problem List, if applicable.      If you have any questions about this query contact me at:  [email or web address]        Dr. Sanchez,      [facility] Consult noted \"Elevated troponin secondary to type II MI\" and \"Plan:  1.  Isosorbide dinitrate 20 mg 3 times daily. 2.  Hydralazine 25 twice daily.  3.  Coreg 25 twice daily. 4.  Hemodialysis for volume management.\" Type II MI is documented once and not included in the Discharge Summary.  Chest pain was documented while in the emergency department.  The patient was also noted with Acute exacerbation of CHF and Chronic obstructive pulmonary disease with acute exacerbation and treatment with nebulizers, intravenous antibiotics, and Solumedrol.     24 1350 Gen 5 Troponin T 68  24 1552 Gen 5 2 Hr Troponin T Reflex 66; Troponin T Delta -2     24 EKG:  \"Sinus rhythm. Prolonged KS interval. Probable left atrial enlargement. Probable LVH with secondary repol abnrm.  Anterior Q waves, possibly due to LVH. Prolonged QT interval.\"     After study, was Type II MI clinically supported during this admission?     Type II MI was supported with additional clinical indicators:____________  Type II MI was not supported, elevated troponin only  Other- specify_____________  Unable to determine        By submitting this query, we are merely seeking further clarification of documentation to accurately reflect all conditions that you are monitoring, evaluating, treating or that extend the hospitalization or utilize additional resources of care. Please utilize your independent clinical judgment when " addressing the question(s) above.      This query and your response, once completed, will be entered into the legal medical record.     Sincerely,  Sary MELTON, RN, CCDS  Clinical Documentation Improvement Program  [email or web address]

## 2024-05-10 ENCOUNTER — NURSING HOME (OUTPATIENT)
Dept: INTERNAL MEDICINE | Age: 89
End: 2024-05-10
Payer: MEDICARE

## 2024-05-10 DIAGNOSIS — J44.0 COPD WITH LOWER RESPIRATORY INFECTION: ICD-10-CM

## 2024-05-10 DIAGNOSIS — Z99.81 OXYGEN DEPENDENT: ICD-10-CM

## 2024-05-10 DIAGNOSIS — I73.9 PERIPHERAL VASCULAR DISEASE: ICD-10-CM

## 2024-05-10 DIAGNOSIS — R54 FRAILTY SYNDROME IN GERIATRIC PATIENT: ICD-10-CM

## 2024-05-10 DIAGNOSIS — N18.6 ESRD (END STAGE RENAL DISEASE): Primary | ICD-10-CM

## 2024-05-10 DIAGNOSIS — Z87.891 EX-SMOKER: ICD-10-CM

## 2024-05-10 DIAGNOSIS — D63.1 ANEMIA OF CHRONIC RENAL FAILURE, STAGE 4 (SEVERE): ICD-10-CM

## 2024-05-10 DIAGNOSIS — E11.69 TYPE 2 DIABETES MELLITUS WITH OTHER SPECIFIED COMPLICATION, WITHOUT LONG-TERM CURRENT USE OF INSULIN: ICD-10-CM

## 2024-05-10 DIAGNOSIS — J06.9 ACUTE URI: ICD-10-CM

## 2024-05-10 DIAGNOSIS — E78.2 MIXED HYPERLIPIDEMIA: ICD-10-CM

## 2024-05-10 DIAGNOSIS — N18.4 ANEMIA OF CHRONIC RENAL FAILURE, STAGE 4 (SEVERE): ICD-10-CM

## 2024-05-10 DIAGNOSIS — K21.9 GASTROESOPHAGEAL REFLUX DISEASE WITHOUT ESOPHAGITIS: ICD-10-CM

## 2024-05-10 DIAGNOSIS — I10 ESSENTIAL HYPERTENSION: ICD-10-CM

## 2024-05-10 DIAGNOSIS — E78.5 HYPERLIPIDEMIA LDL GOAL <70: ICD-10-CM

## 2024-05-10 DIAGNOSIS — I25.10 CORONARY ARTERY DISEASE INVOLVING NATIVE CORONARY ARTERY OF NATIVE HEART WITHOUT ANGINA PECTORIS: ICD-10-CM

## 2024-05-15 LAB
MYCOBACTERIUM SPEC CULT: NORMAL
MYCOBACTERIUM SPEC CULT: NORMAL
NIGHT BLUE STAIN TISS: NORMAL

## 2024-05-16 ENCOUNTER — OFFICE VISIT (OUTPATIENT)
Dept: CARDIOLOGY | Facility: CLINIC | Age: 89
End: 2024-05-16
Payer: MEDICARE

## 2024-05-16 VITALS — HEART RATE: 85 BPM | DIASTOLIC BLOOD PRESSURE: 44 MMHG | SYSTOLIC BLOOD PRESSURE: 153 MMHG

## 2024-05-16 DIAGNOSIS — I25.10 CORONARY ARTERY CALCIFICATION SEEN ON CT SCAN: ICD-10-CM

## 2024-05-16 DIAGNOSIS — I10 ESSENTIAL HYPERTENSION: ICD-10-CM

## 2024-05-16 DIAGNOSIS — I35.0 AORTIC STENOSIS, MODERATE: Primary | ICD-10-CM

## 2024-05-16 DIAGNOSIS — E78.5 HYPERLIPIDEMIA LDL GOAL <70: ICD-10-CM

## 2024-05-16 PROBLEM — M15.3 SECONDARY MULTIPLE ARTHRITIS: Status: ACTIVE | Noted: 2023-06-02

## 2024-05-16 PROBLEM — J15.1 PSEUDOMONAS PNEUMONIA: Status: RESOLVED | Noted: 2024-05-06 | Resolved: 2024-05-16

## 2024-05-16 PROBLEM — E11.9 TYPE 2 DIABETES MELLITUS: Status: RESOLVED | Noted: 2021-10-29 | Resolved: 2024-05-16

## 2024-05-16 PROBLEM — D68.9 COAGULATION DEFECT, UNSPECIFIED: Status: ACTIVE | Noted: 2023-12-05

## 2024-05-16 PROBLEM — N18.6 ESRD (END STAGE RENAL DISEASE): Status: RESOLVED | Noted: 2024-03-04 | Resolved: 2024-05-16

## 2024-05-16 PROBLEM — R60.9 EDEMA: Status: RESOLVED | Noted: 2021-10-29 | Resolved: 2024-05-16

## 2024-05-16 PROBLEM — E11.51 TYPE 2 DIABETES MELLITUS WITH DIABETIC PERIPHERAL ANGIOPATHY WITHOUT GANGRENE: Status: ACTIVE | Noted: 2023-12-04

## 2024-05-16 PROBLEM — M25.512 ACUTE PAIN OF LEFT SHOULDER: Status: RESOLVED | Noted: 2021-12-06 | Resolved: 2024-05-16

## 2024-05-16 PROBLEM — E83.39 HYPERPHOSPHATEMIA: Status: ACTIVE | Noted: 2023-08-03

## 2024-05-16 PROBLEM — J06.9 ACUTE URI: Status: RESOLVED | Noted: 2024-05-10 | Resolved: 2024-05-16

## 2024-05-16 PROBLEM — I73.9 PERIPHERAL VASCULAR DISEASE: Status: RESOLVED | Noted: 2022-01-10 | Resolved: 2024-05-16

## 2024-05-16 PROBLEM — Z87.01 HISTORY OF RECENT PNEUMONIA: Status: RESOLVED | Noted: 2024-05-02 | Resolved: 2024-05-16

## 2024-05-16 PROBLEM — M19.041 ARTHRITIS OF RIGHT HAND: Status: RESOLVED | Noted: 2022-02-08 | Resolved: 2024-05-16

## 2024-05-16 PROBLEM — M19.90 IDIOPATHIC OSTEOARTHRITIS: Status: ACTIVE | Noted: 2023-06-02

## 2024-05-16 PROBLEM — J18.9 PNA (PNEUMONIA): Status: RESOLVED | Noted: 2024-05-04 | Resolved: 2024-05-16

## 2024-05-16 PROBLEM — E78.2 MIXED HYPERLIPIDEMIA: Status: RESOLVED | Noted: 2024-05-10 | Resolved: 2024-05-16

## 2024-05-16 PROBLEM — N18.6 ESRD (END STAGE RENAL DISEASE): Chronic | Status: RESOLVED | Noted: 2024-04-22 | Resolved: 2024-05-16

## 2024-05-16 PROBLEM — I50.9 CHF (CONGESTIVE HEART FAILURE): Status: RESOLVED | Noted: 2024-03-13 | Resolved: 2024-05-16

## 2024-05-16 PROBLEM — E03.9 HYPOTHYROIDISM, UNSPECIFIED: Status: ACTIVE | Noted: 2023-12-04

## 2024-05-16 PROBLEM — D63.8 ANEMIA OF CHRONIC DISEASE: Chronic | Status: RESOLVED | Noted: 2023-11-02 | Resolved: 2024-05-16

## 2024-05-16 PROBLEM — T78.2XXA ANAPHYLACTIC SHOCK, UNSPECIFIED, INITIAL ENCOUNTER: Status: RESOLVED | Noted: 2023-12-05 | Resolved: 2024-05-16

## 2024-05-16 PROBLEM — J15.1 PSEUDOMONAS PNEUMONIA: Status: RESOLVED | Noted: 2024-04-29 | Resolved: 2024-05-16

## 2024-05-16 PROBLEM — E87.70 VOLUME OVERLOAD: Status: RESOLVED | Noted: 2024-04-15 | Resolved: 2024-05-16

## 2024-05-16 PROBLEM — Z99.2 DEPENDENCE ON RENAL DIALYSIS: Status: RESOLVED | Noted: 2023-12-04 | Resolved: 2024-05-16

## 2024-05-16 PROBLEM — J30.9 ALLERGIC RHINITIS: Status: RESOLVED | Noted: 2021-11-03 | Resolved: 2024-05-16

## 2024-05-16 PROBLEM — Z99.2 DEPENDENCE ON RENAL DIALYSIS: Status: ACTIVE | Noted: 2023-12-04

## 2024-05-16 PROCEDURE — 1160F RVW MEDS BY RX/DR IN RCRD: CPT | Performed by: NURSE PRACTITIONER

## 2024-05-16 PROCEDURE — 1159F MED LIST DOCD IN RCRD: CPT | Performed by: NURSE PRACTITIONER

## 2024-05-16 PROCEDURE — 99214 OFFICE O/P EST MOD 30 MIN: CPT | Performed by: NURSE PRACTITIONER

## 2024-05-16 RX ORDER — FUROSEMIDE 20 MG/1
20 TABLET ORAL DAILY
COMMUNITY

## 2024-05-16 NOTE — PROGRESS NOTES
Chief Complaint  Follow-up    Subjective            History of Present Illness  Charlette Rai is an 89-year-old female patient who presents to the office today for follow-up.  She has moderate aortic stenosis, CAD, hypertension, and hyperlipidemia.  She is compliant with medication.  She denies any new or worsening cardiac symptoms at this time.    [facility]  Past Medical History:   Diagnosis Date    Allergic rhinitis     Anaphylactic shock, unspecified, initial encounter 12/05/2023    Anemia     Arthritis     Asthma     USE INHALERS AND NEBULIZERS    Back pain     Bladder disorder     Cancer     LEFT LUNG CANCER 2005 SURGERY AND CHEMO DONE  AND CURRENTLY 4/ 14/22  RIGHT LUNG CANCER HAS ONLY RECEIVED RADIATION THUS FAR    Chronic kidney disease     stage 3    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 DENIES CP BUT DOES HAVE SOA CHRONIC ISSUE COPD/LUNG CANCER    COPD (chronic obstructive pulmonary disease)     FOLLOWED BY DR MARIAJOSE BAILEY    Coronary artery disease     DENIES CP BUT DOES GET SOA MOST OF THE TIME WITH EXERTION BUT OCC AT REST CHRONIC ISSUE COPD/LUNG CANCER    Deep vein thrombosis     Diabetes mellitus     DOES NOT CHECK BS DAILY    Disease of thyroid gland     HYPOTHYROIDISM    Essential hypertension     Gastric ulcer     GERD (gastroesophageal reflux disease)     Heart murmur     History of transfusion     NO ISSUES POST TRANSFUSION WAS MANY YEARS AGO    Hyperlipemia     Leukocytopenia     FOLLOWED BY DR MIR BAILEY    Limb swelling     Lumbago     Lumbar spinal stenosis     Lung cancer     Migraine headache     Multiple joint pain     On home oxygen therapy     3L/NC PRN    Osteopenia     PNA (pneumonia) 05/04/2024    Pseudomonas pneumonia 04/29/2024    Reflux esophagitis     Shortness of breath     Thyroid nodule     HAS ULTRASOUND YEARLY BEING  MONITORED    Vascular disease     Vitamin D deficiency          ALLERGY  No Known Allergies       SURGICALHX  Past Surgical History:   Procedure Laterality Date    ABDOMINAL SURGERY      APPENDECTOMY      ARTERIOVENOUS FISTULA/SHUNT SURGERY Left 05/10/2022    Procedure: Left basilic vein transposition;  Surgeon: Wan Barksdale MD;  Location: [facility] MAIN OR;  Service: Vascular;  Laterality: Left;    ARTERIOVENOUS FISTULA/SHUNT SURGERY Left 3/23/2023    Procedure: Ligation of left arm arteriovenous fistula;  Surgeon: Wan Barksdale MD;  Location: [facility] MAIN OR;  Service: Vascular;  Laterality: Left;    ARTERIOVENOUS FISTULA/SHUNT SURGERY Left 2023    Procedure: Creation of left arm arteriovenous graft;  Surgeon: Wan Barksdale MD;  Location: [facility] MAIN OR;  Service: Vascular;  Laterality: Left;    BRONCHOSCOPY N/A 2024    Procedure: BRONCHOSCOPY WITH BAL AND WASHINGS;  Surgeon: Khalif Yanez MD;  Location: [facility] ENDOSCOPY;  Service: Pulmonary;  Laterality: N/A;  MUCUS PLUGGING    CARDIAC CATHETERIZATION      CARDIAC SURGERY      CARDIAC SURGERY      fluid drained from heart    CATARACT EXTRACTION, BILATERAL      COLONOSCOPY      ENDOSCOPY      2019    FEMORAL ARTERY STENT Bilateral     HYSTERECTOMY      LUNG BIOPSY Left 2005    lobectomy upper lung caner    LUNG VOLUME REDUCTION      OTHER SURGICAL HISTORY      artifical joints/limbs    REPLACEMENT TOTAL KNEE Left     UPPER GASTROINTESTINAL ENDOSCOPY            SOC  Social History     Socioeconomic History    Marital status:    Tobacco Use    Smoking status: Former     Current packs/day: 0.00     Average packs/day: 1 pack/day for 52.5 years (52.5 ttl pk-yrs)     Types: Cigarettes     Start date:      Quit date: 2004     Years since quittin.9     Passive exposure: Past    Smokeless tobacco: Never   Vaping Use    Vaping status: Never Used   Substance and Sexual Activity    Alcohol use: Never    Drug use: Never     Sexual activity: Defer         FAMHX  Family History   Problem Relation Age of Onset    Arthritis Mother     Arthritis Father     Cancer Brother     Diabetes Brother     Other Brother         blood disease     Prostate cancer Brother     Cancer Brother     Prostate cancer Brother     Cancer Brother     Cancer Brother     Malig Hyperthermia Neg Hx     Colon cancer Neg Hx           MEDSIGONLY  Current Outpatient Medications on File Prior to Visit   Medication Sig    albuterol sulfate  (90 Base) MCG/ACT inhaler Inhale 2 puffs Every 4 (Four) Hours As Needed for Wheezing.    arformoterol (BROVANA) 15 MCG/2ML nebulizer solution Take 2 mL by nebulization 2 (Two) Times a Day for 30 days.    aspirin 81 MG EC tablet Take 1 tablet by mouth Daily.    budesonide-formoterol (SYMBICORT) 160-4.5 MCG/ACT inhaler Inhale 2 puffs 2 (Two) Times a Day.    carvedilol (COREG) 12.5 MG tablet Take 1 tablet by mouth 2 (Two) Times a Day With Meals for 30 days.    dexlansoprazole (DEXILANT) 60 MG capsule Take 1 capsule by mouth Daily.    furosemide (LASIX) 20 MG tablet Take 1 tablet by mouth Daily.    ipratropium-albuterol (DUO-NEB) 0.5-2.5 mg/3 ml nebulizer Take 3 mL by nebulization Every 4 (Four) Hours As Needed for Shortness of Air or Wheezing for up to 30 days.    isosorbide dinitrate (ISORDIL) 20 MG tablet Take 1 tablet by mouth 3 (Three) Times a Day for 30 days.    linagliptin (Tradjenta) 5 MG tablet tablet Take 1 tablet by mouth Daily.    losartan (Cozaar) 25 MG tablet Take 2 tablets by mouth Daily.    rosuvastatin (CRESTOR) 20 MG tablet Take 1 tablet by mouth Daily.    saccharomyces boulardii (FLORASTOR) 250 MG capsule Take 1 capsule by mouth 2 (Two) Times a Day for 24 doses.    sevelamer (RENVELA) 800 MG tablet Take 1 tablet by mouth 3 (Three) Times a Day With Meals.    levocetirizine (XYZAL) 5 MG tablet TAKE 1 TABLET DAILY (Patient taking differently: Take 1 tablet by mouth Every Evening.)    nitroglycerin (NITROSTAT) 0.4 MG  SL tablet Place 1 tablet under the tongue Every 5 (Five) Minutes As Needed for Chest Pain.    revefenacin (YUPELRI) 175 MCG/3ML nebulizer solution Take 3 mL by nebulization Daily for 30 days.    tobramycin PF (Moy) 300 MG/5ML nebulizer solution Take 5 mL by nebulization 2 (Two) Times a Day for 30 days. Cycle 30 days on, then 30 days off.    traMADol (ULTRAM) 50 MG tablet Take 1 tablet by mouth Every 8 (Eight) Hours As Needed for Moderate Pain for up to 10 days.    Trulicity 1.5 MG/0.5ML solution pen-injector Inject 1.5 mg under the skin into the appropriate area as directed Every 14 (Fourteen) Days.     No current facility-administered medications on file prior to visit.         Objective   /44   Pulse 85       Physical Exam  Constitutional:       Appearance: She is normal weight.   HENT:      Head: Normocephalic.   Neck:      Vascular: No carotid bruit.   Cardiovascular:      Rate and Rhythm: Normal rate and regular rhythm.      Pulses: Normal pulses.      Heart sounds: Murmur heard.   Pulmonary:      Effort: Pulmonary effort is normal.      Breath sounds: Normal breath sounds.   Musculoskeletal:      Cervical back: Neck supple.      Right lower leg: No edema.      Left lower leg: No edema.   Skin:     General: Skin is dry.   Neurological:      Mental Status: She is alert. Mental status is at baseline.   Psychiatric:         Behavior: Behavior normal.       Result Review :   The following data was reviewed by: DAT Gutiérrez on 05/16/2024:  proBNP   Date Value Ref Range Status   04/21/2024 7,318.0 (H) 0.0 - 1,800.0 pg/mL Final         5/3/2024    13:51   CMP   Glucose 141    BUN 23    Creatinine 3.21    EGFR 13.5    Sodium 134    Potassium 4.3    Chloride 100    Calcium 8.1    Total Protein    Albumin    Globulin    Total Bilirubin    Alkaline Phosphatase    AST (SGOT)    ALT (SGPT)    Albumin/Globulin Ratio    BUN/Creatinine Ratio 7.2    Anion Gap 9.0          5/1/2024    19:49 5/3/2024    13:51  "  CBC w/Diff   WBC 7.11  7.14    RBC 2.78  2.92    Hemoglobin 8.5  8.9    Hematocrit 28.1  29.8    .1  102.1    MCH 30.6  30.5    MCHC 30.2  29.9    RDW 17.2  17.0    Platelets 123  127    Neutrophil Rel % 80.2  87.4    Immature Granulocyte Rel % 0.6  0.7    Lymphocyte Rel % 7.5  4.9    Monocyte Rel % 10.7  5.6    Eosinophil Rel % 0.7  1.1    Basophil Rel % 0.3  0.3       Details          This result is from an external source.              Lab Results   Component Value Date    TSH 3.630 05/01/2024      Lab Results   Component Value Date    FREET4 1.34 05/01/2024      D-Dimer, Quantitative   Date Value Ref Range Status   03/13/2024 4.30 (H) 0.00 - 0.87 MCGFEU/mL Final     Magnesium   Date Value Ref Range Status   05/01/2024 1.7 1.6 - 2.4 mg/dL Final      No results found for: \"DIGOXIN\"   Lab Results   Component Value Date    TROPONINT 68 (C) 05/01/2024           Lipid Panel          7/26/2023    08:17   Lipid Panel   Total Cholesterol 112    Triglycerides 83    HDL Cholesterol 60    VLDL Cholesterol 16    LDL Cholesterol  36    LDL/HDL Ratio 0.59        Results for orders placed during the hospital encounter of 10/29/23    Adult Transthoracic Echo Complete W/ Cont if Necessary Per Protocol    Interpretation Summary    Left ventricular systolic function is normal. Calculated left ventricular EF = 56.8%    Left ventricular wall thickness is consistent with mild asymmetric hypertrophy.    Left ventricular diastolic function is consistent with (grade I) impaired relaxation.    Left atrial volume is moderately increased.    Moderate aortic valve stenosis is present.    Estimated right ventricular systolic pressure from tricuspid regurgitation is normal (<35 mmHg).    Mild dilation of the aortic root is present.         Assessment and Plan    Diagnoses and all orders for this visit:    1. Aortic stenosis, moderate (Primary)  Asymptomatic, continue to monitor with repeat echocardiogram.    2. Coronary artery " calcification seen on CT scan  She denies any angina, continue aspirin 81 mg daily and isosorbide 20 mg three times daily.    3. Essential hypertension  Slightly elevated in office today. She has blood pressure log with her today showing well controlled readings. Continue carvedilol 12.5 mg twice daily, lasix 20 mg daily, and losartan 50 mg daily.    4. Hyperlipidemia LDL goal <70  Last lipid panel was 7/26/23 with LDL 36 which is within goal range, continue rosuvastatin 20 mg daily.          Follow Up   Return in about 6 months (around 11/16/2024) for Follow up with Dr Sanchez.    Patient was given instructions and counseling regarding her condition or for health maintenance advice. Please see specific information pulled into the AVS if appropriate.     Charlette R Cecelia  reports that she quit smoking about 19 years ago. Her smoking use included cigarettes. She started smoking about 72 years ago. She has a 52.5 pack-year smoking history. She has been exposed to tobacco smoke. She has never used smokeless tobacco.          Jazzy Addison, DAT  05/16/24  15:07 EDT    Dictated Utilizing Dragon Dictation

## 2024-05-22 LAB
FUNGUS WND CULT: ABNORMAL
FUNGUS WND CULT: ABNORMAL
MYCOBACTERIUM SPEC CULT: NORMAL
MYCOBACTERIUM SPEC CULT: NORMAL
NIGHT BLUE STAIN TISS: NORMAL

## 2024-05-29 LAB
MYCOBACTERIUM SPEC CULT: NORMAL
MYCOBACTERIUM SPEC CULT: NORMAL
NIGHT BLUE STAIN TISS: NORMAL

## 2024-05-30 ENCOUNTER — APPOINTMENT (OUTPATIENT)
Dept: CARDIOLOGY | Facility: HOSPITAL | Age: 89
End: 2024-05-30
Payer: MEDICARE

## 2024-05-30 ENCOUNTER — HOSPITAL ENCOUNTER (INPATIENT)
Facility: HOSPITAL | Age: 89
LOS: 5 days | Discharge: HOME HEALTH | End: 2024-06-04
Attending: EMERGENCY MEDICINE | Admitting: INTERNAL MEDICINE
Payer: MEDICARE

## 2024-05-30 ENCOUNTER — APPOINTMENT (OUTPATIENT)
Dept: GENERAL RADIOLOGY | Facility: HOSPITAL | Age: 89
End: 2024-05-30
Payer: MEDICARE

## 2024-05-30 DIAGNOSIS — N18.6 ESRD (END STAGE RENAL DISEASE): ICD-10-CM

## 2024-05-30 DIAGNOSIS — J96.22 ACUTE ON CHRONIC RESPIRATORY FAILURE WITH HYPOXIA AND HYPERCAPNIA: ICD-10-CM

## 2024-05-30 DIAGNOSIS — R09.02 HYPOXIA: ICD-10-CM

## 2024-05-30 DIAGNOSIS — J44.0 COPD WITH LOWER RESPIRATORY INFECTION: ICD-10-CM

## 2024-05-30 DIAGNOSIS — J18.9 PNEUMONIA DUE TO INFECTIOUS ORGANISM, UNSPECIFIED LATERALITY, UNSPECIFIED PART OF LUNG: Primary | ICD-10-CM

## 2024-05-30 DIAGNOSIS — J96.02 ACUTE RESPIRATORY FAILURE WITH HYPERCAPNIA: ICD-10-CM

## 2024-05-30 DIAGNOSIS — I50.33 ACUTE ON CHRONIC HEART FAILURE WITH PRESERVED EJECTION FRACTION (HFPEF): ICD-10-CM

## 2024-05-30 DIAGNOSIS — R26.2 DIFFICULTY WALKING: ICD-10-CM

## 2024-05-30 DIAGNOSIS — J96.21 ACUTE ON CHRONIC RESPIRATORY FAILURE WITH HYPOXIA AND HYPERCAPNIA: ICD-10-CM

## 2024-05-30 DIAGNOSIS — Z78.9 DECREASED ACTIVITIES OF DAILY LIVING (ADL): ICD-10-CM

## 2024-05-30 LAB
ALBUMIN SERPL-MCNC: 3.7 G/DL (ref 3.5–5.2)
ALBUMIN/GLOB SERPL: 1.4 G/DL
ALP SERPL-CCNC: 191 U/L (ref 39–117)
ALT SERPL W P-5'-P-CCNC: 16 U/L (ref 1–33)
ANION GAP SERPL CALCULATED.3IONS-SCNC: 4.8 MMOL/L (ref 5–15)
AORTIC DIMENSIONLESS INDEX: 0.51 (DI)
APTT PPP: 143.8 SECONDS (ref 78–95.9)
APTT PPP: 28.2 SECONDS (ref 78–95.9)
APTT PPP: 31.2 SECONDS (ref 78–95.9)
ARTERIAL PATENCY WRIST A: POSITIVE
ASCENDING AORTA: 3 CM
AST SERPL-CCNC: 24 U/L (ref 1–32)
BASE EXCESS BLDA CALC-SCNC: 2.1 MMOL/L (ref -2–2)
BASOPHILS # BLD AUTO: 0.08 10*3/MM3 (ref 0–0.2)
BASOPHILS NFR BLD AUTO: 0.8 % (ref 0–1.5)
BDY SITE: ABNORMAL
BH CV ECHO MEAS - ACS: 1.5 CM
BH CV ECHO MEAS - AO MAX PG: 15.7 MMHG
BH CV ECHO MEAS - AO MEAN PG: 9 MMHG
BH CV ECHO MEAS - AO ROOT DIAM: 3 CM
BH CV ECHO MEAS - AO V2 MAX: 198 CM/SEC
BH CV ECHO MEAS - AO V2 VTI: 49.6 CM
BH CV ECHO MEAS - AVA(I,D): 0.9 CM2
BH CV ECHO MEAS - EDV(CUBED): 54.9 ML
BH CV ECHO MEAS - EDV(MOD-SP2): 87.9 ML
BH CV ECHO MEAS - EDV(MOD-SP4): 73.5 ML
BH CV ECHO MEAS - EF(MOD-BP): 60.7 %
BH CV ECHO MEAS - EF(MOD-SP2): 58.2 %
BH CV ECHO MEAS - EF(MOD-SP4): 59.9 %
BH CV ECHO MEAS - ESV(CUBED): 22 ML
BH CV ECHO MEAS - ESV(MOD-SP2): 36.7 ML
BH CV ECHO MEAS - ESV(MOD-SP4): 29.5 ML
BH CV ECHO MEAS - FS: 26.3 %
BH CV ECHO MEAS - IVS/LVPW: 1.17 CM
BH CV ECHO MEAS - IVSD: 1.4 CM
BH CV ECHO MEAS - LA DIMENSION: 3.3 CM
BH CV ECHO MEAS - LAT PEAK E' VEL: 6.9 CM/SEC
BH CV ECHO MEAS - LV MASS(C)D: 173.1 GRAMS
BH CV ECHO MEAS - LV MAX PG: 4.3 MMHG
BH CV ECHO MEAS - LV MEAN PG: 2 MMHG
BH CV ECHO MEAS - LV V1 MAX: 104 CM/SEC
BH CV ECHO MEAS - LV V1 VTI: 25.2 CM
BH CV ECHO MEAS - LVIDD: 3.8 CM
BH CV ECHO MEAS - LVIDS: 2.8 CM
BH CV ECHO MEAS - LVOT AREA: 1.77 CM2
BH CV ECHO MEAS - LVOT DIAM: 1.5 CM
BH CV ECHO MEAS - LVPWD: 1.2 CM
BH CV ECHO MEAS - MED PEAK E' VEL: 7 CM/SEC
BH CV ECHO MEAS - MR MAX PG: 137.9 MMHG
BH CV ECHO MEAS - MR MAX VEL: 585.5 CM/SEC
BH CV ECHO MEAS - MV A MAX VEL: 173 CM/SEC
BH CV ECHO MEAS - MV DEC SLOPE: 1138 CM/SEC2
BH CV ECHO MEAS - MV DEC TIME: 0.14 SEC
BH CV ECHO MEAS - MV E MAX VEL: 125 CM/SEC
BH CV ECHO MEAS - MV E/A: 0.72
BH CV ECHO MEAS - MV MEAN PG: 8 MMHG
BH CV ECHO MEAS - MV P1/2T: 44.5 MSEC
BH CV ECHO MEAS - MV V2 VTI: 36 CM
BH CV ECHO MEAS - MVA(P1/2T): 4.9 CM2
BH CV ECHO MEAS - MVA(VTI): 1.24 CM2
BH CV ECHO MEAS - PA V2 MAX: 99.5 CM/SEC
BH CV ECHO MEAS - PULM DIAS VEL: 38.2 CM/SEC
BH CV ECHO MEAS - PULM S/D: 1.45
BH CV ECHO MEAS - PULM SYS VEL: 55.5 CM/SEC
BH CV ECHO MEAS - QP/QS: 1.18
BH CV ECHO MEAS - RAP SYSTOLE: 15 MMHG
BH CV ECHO MEAS - RV MAX PG: 1.98 MMHG
BH CV ECHO MEAS - RV V1 MAX: 70.3 CM/SEC
BH CV ECHO MEAS - RV V1 VTI: 13.8 CM
BH CV ECHO MEAS - RVDD: 3.2 CM
BH CV ECHO MEAS - RVOT DIAM: 2.2 CM
BH CV ECHO MEAS - RVSP: 49.8 MMHG
BH CV ECHO MEAS - SV(LVOT): 44.5 ML
BH CV ECHO MEAS - SV(MOD-SP2): 51.2 ML
BH CV ECHO MEAS - SV(MOD-SP4): 44 ML
BH CV ECHO MEAS - SV(RVOT): 52.5 ML
BH CV ECHO MEAS - TAPSE (>1.6): 1.41 CM
BH CV ECHO MEAS - TR MAX PG: 34.8 MMHG
BH CV ECHO MEAS - TR MAX VEL: 295 CM/SEC
BH CV ECHO MEASUREMENTS AVERAGE E/E' RATIO: 17.99
BH CV XLRA - TDI S': 9.6 CM/SEC
BILIRUB SERPL-MCNC: 0.2 MG/DL (ref 0–1.2)
BUN SERPL-MCNC: 23 MG/DL (ref 8–23)
BUN/CREAT SERPL: 11.3 (ref 7–25)
CA-I BLDA-SCNC: 1.11 MMOL/L (ref 1.13–1.32)
CALCIUM SPEC-SCNC: 8.6 MG/DL (ref 8.6–10.5)
CHLORIDE BLDA-SCNC: 96 MMOL/L (ref 98–106)
CHLORIDE SERPL-SCNC: 96 MMOL/L (ref 98–107)
CO2 SERPL-SCNC: 35.2 MMOL/L (ref 22–29)
COHGB MFR BLD: 0.6 % (ref 0–1.5)
CREAT SERPL-MCNC: 2.04 MG/DL (ref 0.57–1)
D-LACTATE SERPL-SCNC: 1.5 MMOL/L (ref 0.5–2)
DEPRECATED RDW RBC AUTO: 55.4 FL (ref 37–54)
EGFRCR SERPLBLD CKD-EPI 2021: 23.2 ML/MIN/1.73
EOSINOPHIL # BLD AUTO: 0.17 10*3/MM3 (ref 0–0.4)
EOSINOPHIL NFR BLD AUTO: 1.7 % (ref 0.3–6.2)
ERYTHROCYTE [DISTWIDTH] IN BLOOD BY AUTOMATED COUNT: 14.5 % (ref 12.3–15.4)
FHHB: 1.7 % (ref 0–5)
GEN 5 2HR TROPONIN T REFLEX: 108 NG/L
GLOBULIN UR ELPH-MCNC: 2.7 GM/DL
GLUCOSE BLDA-MCNC: 213 MG/DL (ref 65–99)
GLUCOSE BLDC GLUCOMTR-MCNC: 190 MG/DL (ref 70–99)
GLUCOSE BLDC GLUCOMTR-MCNC: 255 MG/DL (ref 70–99)
GLUCOSE SERPL-MCNC: 268 MG/DL (ref 65–99)
HCO3 BLDA-SCNC: 30.9 MMOL/L (ref 22–26)
HCT VFR BLD AUTO: 36.6 % (ref 34–46.6)
HGB BLD-MCNC: 10.7 G/DL (ref 12–15.9)
HGB BLDA-MCNC: 11.2 G/DL (ref 11.7–14.6)
HOLD SPECIMEN: NORMAL
IMM GRANULOCYTES # BLD AUTO: 0.04 10*3/MM3 (ref 0–0.05)
IMM GRANULOCYTES NFR BLD AUTO: 0.4 % (ref 0–0.5)
INHALED O2 CONCENTRATION: 60 %
INR PPP: 1.02 (ref 0.86–1.15)
INR PPP: 1.05 (ref 0.86–1.15)
IVRT: 66 MS
LACTATE BLDA-SCNC: 0.65 MMOL/L (ref 0.5–2)
LEFT ATRIUM VOLUME INDEX: 29.7 ML/M2
LV EF 2D ECHO EST: 55 %
LYMPHOCYTES # BLD AUTO: 2.1 10*3/MM3 (ref 0.7–3.1)
LYMPHOCYTES NFR BLD AUTO: 20.4 % (ref 19.6–45.3)
MCH RBC QN AUTO: 30 PG (ref 26.6–33)
MCHC RBC AUTO-ENTMCNC: 29.2 G/DL (ref 31.5–35.7)
MCV RBC AUTO: 102.5 FL (ref 79–97)
METHGB BLD QL: 0 % (ref 0–1.5)
MODALITY: ABNORMAL
MONOCYTES # BLD AUTO: 1.23 10*3/MM3 (ref 0.1–0.9)
MONOCYTES NFR BLD AUTO: 12 % (ref 5–12)
NEUTROPHILS NFR BLD AUTO: 6.65 10*3/MM3 (ref 1.7–7)
NEUTROPHILS NFR BLD AUTO: 64.7 % (ref 42.7–76)
NRBC BLD AUTO-RTO: 0 /100 WBC (ref 0–0.2)
NT-PROBNP SERPL-MCNC: ABNORMAL PG/ML (ref 0–1800)
OXYHGB MFR BLDV: 97.7 % (ref 94–99)
PCO2 BLDA: 72 MM HG (ref 35–45)
PH BLDA: 7.25 PH UNITS (ref 7.35–7.45)
PLATELET # BLD AUTO: 176 10*3/MM3 (ref 140–450)
PMV BLD AUTO: 9.6 FL (ref 6–12)
PO2 BLD: 260 MM[HG] (ref 0–500)
PO2 BLDA: 156 MM HG (ref 80–100)
POTASSIUM BLDA-SCNC: 4 MMOL/L (ref 3.5–5)
POTASSIUM SERPL-SCNC: 4.3 MMOL/L (ref 3.5–5.2)
PROCALCITONIN SERPL-MCNC: 0.21 NG/ML (ref 0–0.25)
PROT SERPL-MCNC: 6.4 G/DL (ref 6–8.5)
PROTHROMBIN TIME: 13.6 SECONDS (ref 11.8–14.9)
PROTHROMBIN TIME: 13.9 SECONDS (ref 11.8–14.9)
QT INTERVAL: 343 MS
QTC INTERVAL: 481 MS
RBC # BLD AUTO: 3.57 10*6/MM3 (ref 3.77–5.28)
SAO2 % BLDCOA: 98.3 % (ref 95–99)
SODIUM BLDA-SCNC: 134.9 MMOL/L (ref 136–146)
SODIUM SERPL-SCNC: 136 MMOL/L (ref 136–145)
TROPONIN T DELTA: 42 NG/L
TROPONIN T SERPL HS-MCNC: 163 NG/L
TROPONIN T SERPL HS-MCNC: 66 NG/L
WBC NRBC COR # BLD AUTO: 10.27 10*3/MM3 (ref 3.4–10.8)
WHOLE BLOOD HOLD COAG: NORMAL
WHOLE BLOOD HOLD SPECIMEN: NORMAL

## 2024-05-30 PROCEDURE — 80053 COMPREHEN METABOLIC PANEL: CPT | Performed by: EMERGENCY MEDICINE

## 2024-05-30 PROCEDURE — 25010000002 METHYLPREDNISOLONE PER 125 MG: Performed by: EMERGENCY MEDICINE

## 2024-05-30 PROCEDURE — 63710000001 INSULIN LISPRO (HUMAN) PER 5 UNITS: Performed by: INTERNAL MEDICINE

## 2024-05-30 PROCEDURE — 93005 ELECTROCARDIOGRAM TRACING: CPT | Performed by: EMERGENCY MEDICINE

## 2024-05-30 PROCEDURE — 93306 TTE W/DOPPLER COMPLETE: CPT | Performed by: INTERNAL MEDICINE

## 2024-05-30 PROCEDURE — 94799 UNLISTED PULMONARY SVC/PX: CPT

## 2024-05-30 PROCEDURE — 83605 ASSAY OF LACTIC ACID: CPT | Performed by: EMERGENCY MEDICINE

## 2024-05-30 PROCEDURE — 85610 PROTHROMBIN TIME: CPT | Performed by: INTERNAL MEDICINE

## 2024-05-30 PROCEDURE — 94664 DEMO&/EVAL PT USE INHALER: CPT

## 2024-05-30 PROCEDURE — 82375 ASSAY CARBOXYHB QUANT: CPT | Performed by: EMERGENCY MEDICINE

## 2024-05-30 PROCEDURE — 5A1D70Z PERFORMANCE OF URINARY FILTRATION, INTERMITTENT, LESS THAN 6 HOURS PER DAY: ICD-10-PCS | Performed by: INTERNAL MEDICINE

## 2024-05-30 PROCEDURE — 99291 CRITICAL CARE FIRST HOUR: CPT

## 2024-05-30 PROCEDURE — 94761 N-INVAS EAR/PLS OXIMETRY MLT: CPT

## 2024-05-30 PROCEDURE — 25810000003 SODIUM CHLORIDE 0.9 % SOLUTION: Performed by: EMERGENCY MEDICINE

## 2024-05-30 PROCEDURE — 99223 1ST HOSP IP/OBS HIGH 75: CPT | Performed by: INTERNAL MEDICINE

## 2024-05-30 PROCEDURE — 85025 COMPLETE CBC W/AUTO DIFF WBC: CPT | Performed by: EMERGENCY MEDICINE

## 2024-05-30 PROCEDURE — 85730 THROMBOPLASTIN TIME PARTIAL: CPT | Performed by: INTERNAL MEDICINE

## 2024-05-30 PROCEDURE — 71045 X-RAY EXAM CHEST 1 VIEW: CPT

## 2024-05-30 PROCEDURE — 93306 TTE W/DOPPLER COMPLETE: CPT

## 2024-05-30 PROCEDURE — 82948 REAGENT STRIP/BLOOD GLUCOSE: CPT

## 2024-05-30 PROCEDURE — 83050 HGB METHEMOGLOBIN QUAN: CPT | Performed by: EMERGENCY MEDICINE

## 2024-05-30 PROCEDURE — 84484 ASSAY OF TROPONIN QUANT: CPT | Performed by: INTERNAL MEDICINE

## 2024-05-30 PROCEDURE — 25010000002 METHYLPREDNISOLONE PER 125 MG: Performed by: INTERNAL MEDICINE

## 2024-05-30 PROCEDURE — 25010000002 HEPARIN (PORCINE) 25000-0.45 UT/250ML-% SOLUTION: Performed by: INTERNAL MEDICINE

## 2024-05-30 PROCEDURE — 82805 BLOOD GASES W/O2 SATURATION: CPT | Performed by: EMERGENCY MEDICINE

## 2024-05-30 PROCEDURE — 84484 ASSAY OF TROPONIN QUANT: CPT | Performed by: EMERGENCY MEDICINE

## 2024-05-30 PROCEDURE — 36415 COLL VENOUS BLD VENIPUNCTURE: CPT | Performed by: EMERGENCY MEDICINE

## 2024-05-30 PROCEDURE — 84145 PROCALCITONIN (PCT): CPT | Performed by: INTERNAL MEDICINE

## 2024-05-30 PROCEDURE — 25010000002 CEFEPIME PER 500 MG: Performed by: EMERGENCY MEDICINE

## 2024-05-30 PROCEDURE — 83880 ASSAY OF NATRIURETIC PEPTIDE: CPT | Performed by: EMERGENCY MEDICINE

## 2024-05-30 PROCEDURE — 94640 AIRWAY INHALATION TREATMENT: CPT

## 2024-05-30 PROCEDURE — 93005 ELECTROCARDIOGRAM TRACING: CPT

## 2024-05-30 PROCEDURE — 25010000002 HEPARIN (PORCINE) PER 1000 UNITS: Performed by: INTERNAL MEDICINE

## 2024-05-30 PROCEDURE — 87040 BLOOD CULTURE FOR BACTERIA: CPT | Performed by: EMERGENCY MEDICINE

## 2024-05-30 PROCEDURE — 94660 CPAP INITIATION&MGMT: CPT

## 2024-05-30 PROCEDURE — 25010000002 VANCOMYCIN 5 G RECONSTITUTED SOLUTION: Performed by: EMERGENCY MEDICINE

## 2024-05-30 PROCEDURE — 36600 WITHDRAWAL OF ARTERIAL BLOOD: CPT | Performed by: EMERGENCY MEDICINE

## 2024-05-30 RX ORDER — SACCHAROMYCES BOULARDII 250 MG
250 CAPSULE ORAL 2 TIMES DAILY
COMMUNITY

## 2024-05-30 RX ORDER — CARVEDILOL 12.5 MG/1
12.5 TABLET ORAL 2 TIMES DAILY WITH MEALS
Status: DISCONTINUED | OUTPATIENT
Start: 2024-05-30 | End: 2024-06-04 | Stop reason: HOSPADM

## 2024-05-30 RX ORDER — IPRATROPIUM BROMIDE AND ALBUTEROL SULFATE 2.5; .5 MG/3ML; MG/3ML
3 SOLUTION RESPIRATORY (INHALATION) ONCE
Status: COMPLETED | OUTPATIENT
Start: 2024-05-30 | End: 2024-05-30

## 2024-05-30 RX ORDER — ALBUMIN (HUMAN) 12.5 G/50ML
12.5 SOLUTION INTRAVENOUS AS NEEDED
Status: ACTIVE | OUTPATIENT
Start: 2024-05-30 | End: 2024-05-31

## 2024-05-30 RX ORDER — DEXTROSE MONOHYDRATE 25 G/50ML
25 INJECTION, SOLUTION INTRAVENOUS
Status: DISCONTINUED | OUTPATIENT
Start: 2024-05-30 | End: 2024-06-04 | Stop reason: HOSPADM

## 2024-05-30 RX ORDER — AMOXICILLIN 250 MG
2 CAPSULE ORAL 2 TIMES DAILY PRN
Status: DISCONTINUED | OUTPATIENT
Start: 2024-05-30 | End: 2024-06-04 | Stop reason: HOSPADM

## 2024-05-30 RX ORDER — BUDESONIDE 0.5 MG/2ML
0.5 INHALANT ORAL
COMMUNITY

## 2024-05-30 RX ORDER — SODIUM CHLORIDE 9 MG/ML
40 INJECTION, SOLUTION INTRAVENOUS AS NEEDED
Status: DISCONTINUED | OUTPATIENT
Start: 2024-05-30 | End: 2024-06-04 | Stop reason: HOSPADM

## 2024-05-30 RX ORDER — SEVELAMER CARBONATE 800 MG/1
800 TABLET, FILM COATED ORAL
Status: DISCONTINUED | OUTPATIENT
Start: 2024-05-30 | End: 2024-06-04 | Stop reason: HOSPADM

## 2024-05-30 RX ORDER — ACETAMINOPHEN 325 MG/1
650 TABLET ORAL EVERY 6 HOURS PRN
COMMUNITY

## 2024-05-30 RX ORDER — BISACODYL 10 MG
10 SUPPOSITORY, RECTAL RECTAL DAILY PRN
Status: DISCONTINUED | OUTPATIENT
Start: 2024-05-30 | End: 2024-06-04 | Stop reason: HOSPADM

## 2024-05-30 RX ORDER — ARFORMOTEROL TARTRATE 15 UG/2ML
15 SOLUTION RESPIRATORY (INHALATION)
Status: DISCONTINUED | OUTPATIENT
Start: 2024-05-30 | End: 2024-06-04 | Stop reason: HOSPADM

## 2024-05-30 RX ORDER — BISACODYL 5 MG/1
5 TABLET, DELAYED RELEASE ORAL DAILY PRN
Status: DISCONTINUED | OUTPATIENT
Start: 2024-05-30 | End: 2024-06-04 | Stop reason: HOSPADM

## 2024-05-30 RX ORDER — POLYETHYLENE GLYCOL 3350 17 G/17G
17 POWDER, FOR SOLUTION ORAL DAILY PRN
Status: DISCONTINUED | OUTPATIENT
Start: 2024-05-30 | End: 2024-06-04 | Stop reason: HOSPADM

## 2024-05-30 RX ORDER — ACETAMINOPHEN 325 MG/1
650 TABLET ORAL EVERY 4 HOURS PRN
Status: DISCONTINUED | OUTPATIENT
Start: 2024-05-30 | End: 2024-06-04 | Stop reason: HOSPADM

## 2024-05-30 RX ORDER — NICOTINE POLACRILEX 4 MG
15 LOZENGE BUCCAL
Status: DISCONTINUED | OUTPATIENT
Start: 2024-05-30 | End: 2024-06-04 | Stop reason: HOSPADM

## 2024-05-30 RX ORDER — PANTOPRAZOLE SODIUM 40 MG/1
40 TABLET, DELAYED RELEASE ORAL
Status: DISCONTINUED | OUTPATIENT
Start: 2024-05-30 | End: 2024-06-04 | Stop reason: HOSPADM

## 2024-05-30 RX ORDER — SODIUM CHLORIDE 0.9 % (FLUSH) 0.9 %
10 SYRINGE (ML) INJECTION AS NEEDED
Status: DISCONTINUED | OUTPATIENT
Start: 2024-05-30 | End: 2024-06-04 | Stop reason: HOSPADM

## 2024-05-30 RX ORDER — ROSUVASTATIN CALCIUM 20 MG/1
20 TABLET, COATED ORAL DAILY
Status: DISCONTINUED | OUTPATIENT
Start: 2024-05-30 | End: 2024-06-04 | Stop reason: HOSPADM

## 2024-05-30 RX ORDER — LOSARTAN POTASSIUM 50 MG/1
50 TABLET ORAL EVERY 24 HOURS
Status: DISCONTINUED | OUTPATIENT
Start: 2024-05-30 | End: 2024-06-03

## 2024-05-30 RX ORDER — SODIUM CHLORIDE 0.9 % (FLUSH) 0.9 %
10 SYRINGE (ML) INJECTION AS NEEDED
Status: DISCONTINUED | OUTPATIENT
Start: 2024-05-30 | End: 2024-05-31

## 2024-05-30 RX ORDER — HEPARIN SODIUM 5000 [USP'U]/ML
5000 INJECTION, SOLUTION INTRAVENOUS; SUBCUTANEOUS EVERY 8 HOURS SCHEDULED
Status: CANCELLED | OUTPATIENT
Start: 2024-05-30

## 2024-05-30 RX ORDER — HEPARIN SODIUM 1000 [USP'U]/ML
3400 INJECTION, SOLUTION INTRAVENOUS; SUBCUTANEOUS AS NEEDED
Status: DISCONTINUED | OUTPATIENT
Start: 2024-05-30 | End: 2024-06-04 | Stop reason: HOSPADM

## 2024-05-30 RX ORDER — IBUPROFEN 600 MG/1
1 TABLET ORAL
Status: DISCONTINUED | OUTPATIENT
Start: 2024-05-30 | End: 2024-06-04 | Stop reason: HOSPADM

## 2024-05-30 RX ORDER — ISOSORBIDE DINITRATE 20 MG/1
20 TABLET ORAL
Status: DISCONTINUED | OUTPATIENT
Start: 2024-05-30 | End: 2024-05-31

## 2024-05-30 RX ORDER — NITROGLYCERIN 0.4 MG/1
0.4 TABLET SUBLINGUAL
Status: DISCONTINUED | OUTPATIENT
Start: 2024-05-30 | End: 2024-06-04 | Stop reason: HOSPADM

## 2024-05-30 RX ORDER — IPRATROPIUM BROMIDE AND ALBUTEROL SULFATE 2.5; .5 MG/3ML; MG/3ML
3 SOLUTION RESPIRATORY (INHALATION) EVERY 4 HOURS
Status: DISCONTINUED | OUTPATIENT
Start: 2024-05-30 | End: 2024-06-04 | Stop reason: HOSPADM

## 2024-05-30 RX ORDER — BUDESONIDE 0.5 MG/2ML
0.5 INHALANT ORAL
Status: DISCONTINUED | OUTPATIENT
Start: 2024-05-30 | End: 2024-06-04 | Stop reason: HOSPADM

## 2024-05-30 RX ORDER — HEPARIN SODIUM 10000 [USP'U]/100ML
12 INJECTION, SOLUTION INTRAVENOUS
Status: DISCONTINUED | OUTPATIENT
Start: 2024-05-30 | End: 2024-05-31

## 2024-05-30 RX ORDER — TOBRAMYCIN INHALATION SOLUTION 300 MG/5ML
300 INHALANT RESPIRATORY (INHALATION)
Status: DISCONTINUED | OUTPATIENT
Start: 2024-05-30 | End: 2024-06-04 | Stop reason: HOSPADM

## 2024-05-30 RX ORDER — SODIUM CHLORIDE 0.9 % (FLUSH) 0.9 %
10 SYRINGE (ML) INJECTION EVERY 12 HOURS SCHEDULED
Status: DISCONTINUED | OUTPATIENT
Start: 2024-05-30 | End: 2024-06-04 | Stop reason: HOSPADM

## 2024-05-30 RX ORDER — ASPIRIN 81 MG/1
81 TABLET ORAL DAILY
Status: DISCONTINUED | OUTPATIENT
Start: 2024-05-30 | End: 2024-06-04 | Stop reason: HOSPADM

## 2024-05-30 RX ORDER — INSULIN LISPRO 100 [IU]/ML
2-7 INJECTION, SOLUTION INTRAVENOUS; SUBCUTANEOUS
Status: DISCONTINUED | OUTPATIENT
Start: 2024-05-30 | End: 2024-06-04 | Stop reason: HOSPADM

## 2024-05-30 RX ADMIN — ARFORMOTEROL TARTRATE 15 MCG: 15 SOLUTION RESPIRATORY (INHALATION) at 18:52

## 2024-05-30 RX ADMIN — BUDESONIDE 0.5 MG: 0.5 SUSPENSION RESPIRATORY (INHALATION) at 18:52

## 2024-05-30 RX ADMIN — TOBRAMYCIN 300 MG: 300 SOLUTION RESPIRATORY (INHALATION) at 22:21

## 2024-05-30 RX ADMIN — PANTOPRAZOLE SODIUM 40 MG: 40 TABLET, DELAYED RELEASE ORAL at 20:52

## 2024-05-30 RX ADMIN — INSULIN LISPRO 2 UNITS: 100 INJECTION, SOLUTION INTRAVENOUS; SUBCUTANEOUS at 14:09

## 2024-05-30 RX ADMIN — IPRATROPIUM BROMIDE AND ALBUTEROL SULFATE 3 ML: .5; 3 SOLUTION RESPIRATORY (INHALATION) at 06:15

## 2024-05-30 RX ADMIN — ACETAMINOPHEN 650 MG: 325 TABLET ORAL at 20:51

## 2024-05-30 RX ADMIN — ROSUVASTATIN 20 MG: 20 TABLET, FILM COATED ORAL at 14:03

## 2024-05-30 RX ADMIN — IPRATROPIUM BROMIDE AND ALBUTEROL SULFATE 3 ML: .5; 3 SOLUTION RESPIRATORY (INHALATION) at 07:26

## 2024-05-30 RX ADMIN — HEPARIN SODIUM 3400 UNITS: 1000 INJECTION INTRAVENOUS; SUBCUTANEOUS at 17:59

## 2024-05-30 RX ADMIN — METHYLPREDNISOLONE SODIUM SUCCINATE 125 MG: 125 INJECTION INTRAMUSCULAR; INTRAVENOUS at 06:06

## 2024-05-30 RX ADMIN — ASPIRIN 81 MG: 81 TABLET, COATED ORAL at 14:03

## 2024-05-30 RX ADMIN — METHYLPREDNISOLONE SODIUM SUCCINATE 125 MG: 125 INJECTION INTRAMUSCULAR; INTRAVENOUS at 08:24

## 2024-05-30 RX ADMIN — Medication 10 ML: at 20:51

## 2024-05-30 RX ADMIN — IPRATROPIUM BROMIDE AND ALBUTEROL SULFATE 3 ML: .5; 3 SOLUTION RESPIRATORY (INHALATION) at 18:52

## 2024-05-30 RX ADMIN — CARVEDILOL 12.5 MG: 12.5 TABLET, FILM COATED ORAL at 20:52

## 2024-05-30 RX ADMIN — SEVELAMER CARBONATE 800 MG: 800 TABLET, FILM COATED ORAL at 20:51

## 2024-05-30 RX ADMIN — Medication 10 ML: at 14:04

## 2024-05-30 RX ADMIN — METHYLPREDNISOLONE SODIUM SUCCINATE 60 MG: 125 INJECTION INTRAMUSCULAR; INTRAVENOUS at 14:07

## 2024-05-30 RX ADMIN — VANCOMYCIN HYDROCHLORIDE 1250 MG: 5 INJECTION, POWDER, LYOPHILIZED, FOR SOLUTION INTRAVENOUS at 09:21

## 2024-05-30 RX ADMIN — HEPARIN SODIUM 12 UNITS/KG/HR: 10000 INJECTION, SOLUTION INTRAVENOUS at 11:08

## 2024-05-30 RX ADMIN — IPRATROPIUM BROMIDE AND ALBUTEROL SULFATE 3 ML: .5; 3 SOLUTION RESPIRATORY (INHALATION) at 22:59

## 2024-05-30 RX ADMIN — CEFEPIME 2000 MG: 2 INJECTION, POWDER, FOR SOLUTION INTRAVENOUS at 08:24

## 2024-05-30 RX ADMIN — IPRATROPIUM BROMIDE AND ALBUTEROL SULFATE 3 ML: .5; 3 SOLUTION RESPIRATORY (INHALATION) at 06:17

## 2024-05-30 RX ADMIN — SEVELAMER CARBONATE 800 MG: 800 TABLET, FILM COATED ORAL at 14:03

## 2024-05-30 RX ADMIN — METHYLPREDNISOLONE SODIUM SUCCINATE 60 MG: 125 INJECTION INTRAMUSCULAR; INTRAVENOUS at 20:52

## 2024-05-30 RX ADMIN — IPRATROPIUM BROMIDE AND ALBUTEROL SULFATE 3 ML: .5; 3 SOLUTION RESPIRATORY (INHALATION) at 13:47

## 2024-05-30 NOTE — CONSULTS
Pulmonary / Critical Care Consult Note      Patient Name: Charlette Rai  : 1935  MRN: [id number]  Primary Care Physician:  Rafael Lyons MD  Referring Physician: No ref. provider found  Date of admission: 2024    Subjective   Subjective     Reason for Consult/ Chief Complaint: Possible volume overload and pneumonia    HPI:  Charlette Rai is a 89 y.o. female with history of left lung cancer status post resection and chemotherapy, right lung cancer status postradiation, COPD, coronary artery disease, end-stage renal disease on hemodialysis, recent history of Pseudomonas pneumonia requiring recurrent hospitalization and inpatient rehabilitation for 2 weeks.  She was discharged home 3 days ago.  She presented to the hospital with worsening shortness of breath starting early this morning at 2 AM.  She was having chest discomfort and tightness.  In the ER, patient was found to be significantly hypertensive with blood pressure in .  ABG showed pH of 7.2, pCO2 72.  She was started on BiPAP 14/6, 6% FiO2.  Chest x-ray was concerning for right upper and lower lobe new infiltrates.  She was started on IV vancomycin and cefepime.  She is admitted for worsening hypoxic respiratory failure, possible pneumonia, hypertensive urgency, end-stage renal disease on hemodialysis.  Pulmonary critical care services consulted for extensive management of her acute issues.  She is currently on BiPAP and is planned for stat dialysis.  She has no fever or chills.  No nausea or vomiting.  No significant headache.  No leg swelling.  Per her report, she had dialysis on Monday and had dialysis yesterday as well.    Review of Systems  General:  No Fatigue, No Fever.  HEENT: No dysphagia, No Visual Changes, no rhinorrhea  Respiratory:  + cough,+Dyspnea, scant phlegm, No Pleuritic Pain, no wheezing, no hemoptysis  Cardiovascular: Denies chest pain, denies palpitations,+HAIDER, + Chest Pressure  Gastrointestinal:  No Abdominal Pain,  No Nausea, No Vomiting, No Diarrhea  Genitourinary:  No Dysuria, No Frequency, No Hesitancy  Musculoskeletal: No muscle pain or swelling  Endocrine:  No Heat Intolerance, No Cold Intolerance  Hematologic:  No Bleeding, No Bruising  Psychiatric:  No Anxiety, No Depression  Neurologic:  No Confusion, no Dysarthria, No Headaches  Skin:  No Rash, No Open Wounds        Personal History     Past Medical History:   Diagnosis Date    Allergic rhinitis     Anaphylactic shock, unspecified, initial encounter 12/05/2023    Anemia     Arthritis     Asthma     USE INHALERS AND NEBULIZERS    Back pain     Bladder disorder     Cancer     LEFT LUNG CANCER 2005 SURGERY AND CHEMO DONE  AND CURRENTLY 4/ 14/22  RIGHT LUNG CANCER HAS ONLY RECEIVED RADIATION THUS FAR    Chronic kidney disease     stage 3    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 DENIES CP BUT DOES HAVE SOA CHRONIC ISSUE COPD/LUNG CANCER    COPD (chronic obstructive pulmonary disease)     FOLLOWED BY DR MARIAJOSE BAILEY    Coronary artery disease     DENIES CP BUT DOES GET SOA MOST OF THE TIME WITH EXERTION BUT OCC AT REST CHRONIC ISSUE COPD/LUNG CANCER    Deep vein thrombosis     Diabetes mellitus     DOES NOT CHECK BS DAILY    Disease of thyroid gland     HYPOTHYROIDISM    Essential hypertension     Gastric ulcer     GERD (gastroesophageal reflux disease)     Heart murmur     History of transfusion     NO ISSUES POST TRANSFUSION WAS MANY YEARS AGO    Hyperlipemia     Leukocytopenia     FOLLOWED BY DR MIR BAILEY    Limb swelling     Lumbago     Lumbar spinal stenosis     Lung cancer     Migraine headache     Multiple joint pain     On home oxygen therapy     3L/NC PRN    Osteopenia     PNA (pneumonia) 05/04/2024    Pseudomonas pneumonia 04/29/2024    Reflux esophagitis     Shortness of breath     Thyroid nodule      HAS ULTRASOUND YEARLY BEING MONITORED    Vascular disease     Vitamin D deficiency        Past Surgical History:   Procedure Laterality Date    ABDOMINAL SURGERY      APPENDECTOMY      ARTERIOVENOUS FISTULA/SHUNT SURGERY Left 05/10/2022    Procedure: Left basilic vein transposition;  Surgeon: Wan Barksdale MD;  Location: [facility] MAIN OR;  Service: Vascular;  Laterality: Left;    ARTERIOVENOUS FISTULA/SHUNT SURGERY Left 3/23/2023    Procedure: Ligation of left arm arteriovenous fistula;  Surgeon: Wan Barksdale MD;  Location: [facility] MAIN OR;  Service: Vascular;  Laterality: Left;    ARTERIOVENOUS FISTULA/SHUNT SURGERY Left 11/30/2023    Procedure: Creation of left arm arteriovenous graft;  Surgeon: Wan Barksdale MD;  Location: [facility] MAIN OR;  Service: Vascular;  Laterality: Left;    BRONCHOSCOPY N/A 4/24/2024    Procedure: BRONCHOSCOPY WITH BAL AND WASHINGS;  Surgeon: Khalif Yanez MD;  Location: [facility] ENDOSCOPY;  Service: Pulmonary;  Laterality: N/A;  MUCUS PLUGGING    CARDIAC CATHETERIZATION  1996    CARDIAC SURGERY      CARDIAC SURGERY      fluid drained from heart    CATARACT EXTRACTION, BILATERAL  2003    COLONOSCOPY  2014    ENDOSCOPY  2016 2019    FEMORAL ARTERY STENT Bilateral     HYSTERECTOMY      LUNG BIOPSY Left 2005    lobectomy upper lung caner    LUNG VOLUME REDUCTION      OTHER SURGICAL HISTORY      artifical joints/limbs    REPLACEMENT TOTAL KNEE Left 2016    UPPER GASTROINTESTINAL ENDOSCOPY         Family History: family history includes Arthritis in her father and mother; Cancer in her brother, brother, brother, and brother; Diabetes in her brother; Other in her brother; Prostate cancer in her brother and brother.     Social History:  reports that she quit smoking about 19 years ago. Her smoking use included cigarettes. She started smoking about 72 years ago. She has a 52.5 pack-year smoking history. She has been exposed to tobacco smoke. She has never used smokeless tobacco. She  reports that she does not drink alcohol and does not use drugs.    Home Medications:  acetaminophen, albuterol sulfate HFA, arformoterol, aspirin, budesonide, carvedilol, dexlansoprazole, furosemide, ipratropium-albuterol, isosorbide dinitrate, levocetirizine, linagliptin, losartan, nitroglycerin, revefenacin, rosuvastatin, saccharomyces boulardii, sevelamer, and tobramycin PF    Allergies:  No Known Allergies    Objective    Objective     Vitals:   Temp:  [97.3 °F (36.3 °C)-97.7 °F (36.5 °C)] 97.3 °F (36.3 °C)  Heart Rate:  [] 88  Resp:  [23-31] 23  BP: (121-202)/(41-72) 151/58  Flow (L/min):  [6] 6    Physical Exam:  Vital Signs Reviewed   General: Frail looking female, on BiPAP, on moderate distress  HEENT:  PERRL, EOMI.  OP, nares clear, no sinus tenderness  Neck:  Supple, no JVD, no thyromegaly  Lymph: no axillary, cervical, supraclavicular lymphadenopathy noted bilaterally  Chest: Poor aeration, bilateral diminished breath sounds, bibasilar crackles, dull to percussion left base, and expiratory wheezing, scattered rhonchi  Abd:  Soft, NT, ND, + BS, no HSM  EXT:  no clubbing, no cyanosis, no edema, no joint tenderness  Neuro:  A&Ox3, CN grossly intact, no focal deficits, generalized weakness+  Skin: No rashes or lesions noted      Result Review    Result Review:  I have personally reviewed the results from the time of this admission to 5/30/2024 14:25 EDT and agree with these findings:  [x]  Laboratory  [x]  Microbiology  [x]  Radiology  [x]  EKG/Telemetry   [x]  Cardiology/Vascular   []  Pathology  []  Old records  []  Other:  Most notable findings include:         Lab 05/30/24  0729 05/30/24  0559   WBC  --  10.27   HEMOGLOBIN  --  10.7*   HEMATOCRIT  --  36.6   PLATELETS  --  176   SODIUM  --  136   SODIUM, ARTERIAL 134.9*  --    POTASSIUM  --  4.3   CHLORIDE  --  96*   CO2  --  35.2*   BUN  --  23   CREATININE  --  2.04*   GLUCOSE  --  268*   GLUCOSE, ARTERIAL 213*  --    CALCIUM  --  8.6   TOTAL  PROTEIN  --  6.4   ALBUMIN  --  3.7   GLOBULIN  --  2.7     XR Chest 1 View    Result Date: 5/30/2024  AP PORTABLE CHEST  HISTORY: Shortness of air. History of left lung cancer.  COMPARISON: 5/1/2024  TECHNIQUE: AP portable chest x-ray.  FINDINGS: Dialysis catheter tip remains in the lower SVC. There is extensive atherosclerotic calcification in the aorta. Postoperative changes are noted in the left lung. Pleural plaques and pleural thickening in the left hemithorax are similar to prior. Areas of chronic consolidation/scarring in the left lung also appears stable. 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 There are new infiltrates at the right lung base that likely reflect pneumonia. No pneumothorax. Background COPD is present.      Impression: New right basilar infiltrates concerning for pneumonia. The remainder of the chest x-ray is largely stable from prior.  Electronically Signed By-Dr. Weston Castillo MD On:5/30/2024 6:08 AM              Assessment & Plan   Assessment / Plan     Active Hospital Problems:  Active Hospital Problems    Diagnosis     **Acute on chronic respiratory failure with hypoxia and hypercapnia          Impression:  Recurrent pneumonia, right lower lobe pneumonia  Recent history of Pseudomonas pneumonia  Possible hypertensive urgency  Volume overload  End-stage renal disease on dialysis  COPD with acute exacerbation  History of lung cancer status post surgery and radiation in past    Plan:  Check a sputum culture if able to obtain.  Check procalcitonin.  Continue with IV vancomycin and cefepime  Continue with Solu-Medrol 60 mg IV every 8 hours.  Continue with DuoNeb every 4 hours.  Start Brovana and Pulmicort  Start JUAN neb twice daily.  Check echocardiogram.  Urgent dialysis.  Nephrology on board.  Continue with Coreg, losartan and Isodril.  Continue with BiPAP 14/6.  We will try to take her off BiPAP after dialysis.    I have reviewed labs, imaging, pertinent clinical data and provider  notes.   I have discussed with bedside nurse and primary service.   Critical care time spent 32 mins excluding any procedures.     Electronically signed by Khalif Yanez MD, 5/30/2024, 14:25 EDT.

## 2024-05-30 NOTE — PLAN OF CARE
Goal Outcome Evaluation:      Patient arrived via EMS on non-rebreather. Patient now wearing bipap settings: 14/7,r4, 60%. No concerns at the time of this note. Will continue to monitor.

## 2024-05-30 NOTE — H&P
[facility] PHYSICAL  Date: 2024   Patient Name: Charlette Rai  : 1935  MRN: [id number]  Primary Care Physician:  Rafael Lyons MD  Date of admission: 2024    Subjective   Subjective     Chief Complaint: Shortness of breath    HPI:    Charlette Rai is a 89 y.o. female with history of left lung cancer status post resection and chemo, right lung cancer status postradiation, COPD exacerbation, CAD, ESRD on HD via chest port every MWF, hypertension, who was recently admitted to the hospital for Pseudomonas pneumonia and was discharged to rehab.  Per family patient was doing well at rehab and was discharged from rehab on Monday.  Patient went home and was doing well until this morning when she started to experience severe shortness of breath.  She was coughing and was bringing up phlegm.  Patient was brought to the ER by her family.  On presentation patient was hypertensive with blood pressure of 202/72, she was saturating in the low 80s on 3 L oxygen via nasal cannula.  ABG showed pH of 7.2 with pCO2 of 72.  Patient was put on BiPAP.  Patient says since she has been on BiPAP she has started to feel better.  Chest x-ray showed new right lung opacities.  Patient also received cefepime and vancomycin in the ER.  She denies nausea, vomiting, diarrhea or constipation.  She denies chest pain.      Personal History     Past Medical History:  Past Medical History:   Diagnosis Date    Allergic rhinitis     Anaphylactic shock, unspecified, initial encounter 2023    Anemia     Arthritis     Asthma     USE INHALERS AND NEBULIZERS    Back pain     Bladder disorder     Cancer     LEFT LUNG CANCER  SURGERY AND CHEMO DONE  AND CURRENTLY   RIGHT LUNG CANCER HAS ONLY RECEIVED RADIATION THUS FAR    Chronic kidney disease     stage 3    CKD (chronic kidney disease) requiring chronic dialysis     CKD (chronic kidney disease) stage 4, GFR 15-29 ml/min     Condition not  found     ulcer    Congestive heart failure (CHF)     FOLLOWED BY DR AQUINO. DENIES CP BUT DOES HAVE SOA CHRONIC ISSUE COPD/LUNG CANCER    COPD (chronic obstructive pulmonary disease)     FOLLOWED BY DR MARIAJOSE BAILEY    Coronary artery disease     DENIES CP BUT DOES GET SOA MOST OF THE TIME WITH EXERTION BUT OCC AT REST CHRONIC ISSUE COPD/LUNG CANCER    Deep vein thrombosis     Diabetes mellitus     DOES NOT CHECK BS DAILY    Disease of thyroid gland     HYPOTHYROIDISM    Essential hypertension     Gastric ulcer     GERD (gastroesophageal reflux disease)     Heart murmur     History of transfusion     NO ISSUES POST TRANSFUSION WAS MANY YEARS AGO    Hyperlipemia     Leukocytopenia     FOLLOWED BY DR MIR BAILEY    Limb swelling     Lumbago     Lumbar spinal stenosis     Lung cancer     Migraine headache     Multiple joint pain     On home oxygen therapy     3L/NC PRN    Osteopenia     PNA (pneumonia) 05/04/2024    Pseudomonas pneumonia 04/29/2024    Reflux esophagitis     Shortness of breath     Thyroid nodule     HAS ULTRASOUND YEARLY BEING MONITORED    Vascular disease     Vitamin D deficiency        Past Surgical History:  Past Surgical History:   Procedure Laterality Date    ABDOMINAL SURGERY      APPENDECTOMY      ARTERIOVENOUS FISTULA/SHUNT SURGERY Left 05/10/2022    Procedure: Left basilic vein transposition;  Surgeon: Wan Barksdale MD;  Location: [facility] MAIN OR;  Service: Vascular;  Laterality: Left;    ARTERIOVENOUS FISTULA/SHUNT SURGERY Left 3/23/2023    Procedure: Ligation of left arm arteriovenous fistula;  Surgeon: Wan Barksdale MD;  Location: [facility] MAIN OR;  Service: Vascular;  Laterality: Left;    ARTERIOVENOUS FISTULA/SHUNT SURGERY Left 11/30/2023    Procedure: Creation of left arm arteriovenous graft;  Surgeon: Wan Barksdale MD;  Location: [facility] MAIN OR;  Service: Vascular;  Laterality: Left;    BRONCHOSCOPY N/A 4/24/2024    Procedure: BRONCHOSCOPY WITH BAL AND WASHINGS;  Surgeon: Khalif Yanez  MD;  Location: [facility] ENDOSCOPY;  Service: Pulmonary;  Laterality: N/A;  MUCUS PLUGGING    CARDIAC CATHETERIZATION      CARDIAC SURGERY      CARDIAC SURGERY      fluid drained from heart    CATARACT EXTRACTION, BILATERAL      COLONOSCOPY      ENDOSCOPY  2016    FEMORAL ARTERY STENT Bilateral     HYSTERECTOMY      LUNG BIOPSY Left 2005    lobectomy upper lung caner    LUNG VOLUME REDUCTION      OTHER SURGICAL HISTORY      artifical joints/limbs    REPLACEMENT TOTAL KNEE Left 2016    UPPER GASTROINTESTINAL ENDOSCOPY         Family History:   Family History   Problem Relation Age of Onset    Arthritis Mother     Arthritis Father     Cancer Brother     Diabetes Brother     Other Brother         blood disease     Prostate cancer Brother     Cancer Brother     Prostate cancer Brother     Cancer Brother     Cancer Brother     Malig Hyperthermia Neg Hx     Colon cancer Neg Hx        Social History:   Social History     Socioeconomic History    Marital status:    Tobacco Use    Smoking status: Former     Current packs/day: 0.00     Average packs/day: 1 pack/day for 52.5 years (52.5 ttl pk-yrs)     Types: Cigarettes     Start date:      Quit date: 2004     Years since quittin.9     Passive exposure: Past    Smokeless tobacco: Never   Vaping Use    Vaping status: Never Used   Substance and Sexual Activity    Alcohol use: Never    Drug use: Never    Sexual activity: Defer       Home Medications:  acetaminophen, albuterol sulfate HFA, arformoterol, aspirin, budesonide, carvedilol, dexlansoprazole, furosemide, ipratropium-albuterol, isosorbide dinitrate, levocetirizine, linagliptin, losartan, nitroglycerin, revefenacin, rosuvastatin, saccharomyces boulardii, sevelamer, and tobramycin PF    Allergies:  No Known Allergies    Review of Systems   All systems were reviewed and negative except for: Shortness of breath, cough    Objective   Objective     Vitals:   Temp:  [97.7 °F (36.5 °C)] 97.7  °F (36.5 °C)  Heart Rate:  [] 85  Resp:  [27-31] 27  BP: (121-202)/(41-72) 147/54  Flow (L/min):  [6] 6    Physical Exam    Constitutional: Awake, alert, mild distress, on BiPAP   Eyes: Pupils equal, sclerae anicteric, no conjunctival injection   HENT: NCAT, mucous membranes moist   Neck: Supple, no thyromegaly, no lymphadenopathy, trachea midline   Respiratory: Labored respiration, bilateral rhonchi and wheezing   Cardiovascular: RRR, no murmurs, rubs, or gallops, palpable pedal pulses bilaterally   Gastrointestinal: Positive bowel sounds, soft, nontender, nondistended   Musculoskeletal: 3+ b/l le edema, no clubbing or cyanosis to extremities   Psychiatric: Appropriate affect, cooperative   Neurologic: Oriented x 3, speech clear   Skin: No rashes     Result Review    Result Review:  I have personally reviewed the results from the time of this admission to 5/30/2024 09:56 EDT and agree with these findings:  [x]  Laboratory  [x]  Microbiology  [x]  Radiology  [x]  EKG/Telemetry   [x]  Cardiology/Vascular   []  Pathology  []  Old records  []  Other:      Assessment & Plan   Assessment / Plan     Assessment/Plan:     Acute on chronic hypoxemic and hypercapnic respiratory failure  Recent Pseudomonas pneumonia  Healthcare associated pneumonia  Acute on chronic COPD exacerbation  History of lung cancer  -Continue cefepime and vancomycin  -Continue Solu-Medrol  -Continue DuoNebs every 4 hours  -Continue tobramycin nebulization  -Pulmonology consulted    End-stage renal disease on HD  -Last HD done yesterday  -Nephrology consulted    NSTEMI  CAD  Hypertension  HLD  Moderate aortic stenosis  -EKG with sinus tachycardia, ST depressions in lateral lateral leads, new when compared to previous EKG  -Will start heparin drip  -Trend troponin  -2D echo  -Continue aspirin, statin, carvedilol, losartan    Diabetes  -Low sliding scale insulin    GERD  -Continue PPI      DVT prophylaxis:  On heparin gtt      CODE STATUS:    Code  Status (Patient has no pulse and is not breathing): CPR (Attempt to Resuscitate)  Medical Interventions (Patient has pulse or is breathing): Full Support  Comments: DNI      Admission Status:  I believe this patient meets inpatient status.    Electronically signed by Gee Cee MD, 05/30/24, 9:39 AM EDT.

## 2024-05-30 NOTE — CONSULTS
[facility]   Nephrology Consult Note      Patient Name: Charlette Rai  : 1935  MRN: [id number]  Primary Care Physician:  Rafael Lyons MD  Referring Physician: No ref. provider found  Date of admission: 2024    Subjective   Subjective     Reason for Consult/ Chief Complaint: End-stage renal disease    HPI:  Charlette Rai is a 89 y.o. female with history of end-stage renal disease, on dialysis 3 times a week at [place], [place] who just recently left [facility] and went home.  She had dialysis yesterday as outpatient that was uneventful,  She was feeling fine by time she went to bed according to her .  She woke up at 3 AM with sudden onset shortness of breath, no chest pain per se.  No fevers or chills.  She was brought to the ER where she was found to have new infiltrate right base, she is currently on BiPAP, severely hypertensive, started empirically on antibiotics for pneumonia.    This is not the first episode of similar presentation.  She has been compliant with her dialysis and medications.  She also has been compliant with her diet.  Family at bedside, provided some history.  Patient is feeling a little bit better.    Review of Systems   All systems were reviewed and negative except for: Was mentioned above.    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 DENIES CP BUT  DOES HAVE SOA CHRONIC ISSUE COPD/LUNG CANCER    COPD (chronic obstructive pulmonary disease)     FOLLOWED BY DR MARIAJOSE BAILEY    Coronary artery disease     DENIES CP BUT DOES GET SOA MOST OF THE TIME WITH EXERTION BUT OCC AT REST CHRONIC ISSUE COPD/LUNG CANCER    Deep vein thrombosis     Diabetes mellitus     DOES NOT CHECK BS DAILY    Disease of thyroid gland     HYPOTHYROIDISM    Essential hypertension     Gastric ulcer     GERD (gastroesophageal reflux disease)     Heart murmur     History of transfusion     NO ISSUES POST TRANSFUSION WAS MANY YEARS AGO    Hyperlipemia     Leukocytopenia     FOLLOWED BY DR MIR BAILEY    Limb swelling     Lumbago     Lumbar spinal stenosis     Lung cancer     Migraine headache     Multiple joint pain     On home oxygen therapy     3L/NC PRN    Osteopenia     PNA (pneumonia) 05/04/2024    Pseudomonas pneumonia 04/29/2024    Reflux esophagitis     Shortness of breath     Thyroid nodule     HAS ULTRASOUND YEARLY BEING MONITORED    Vascular disease     Vitamin D deficiency        Past Surgical History:   Procedure Laterality Date    ABDOMINAL SURGERY      APPENDECTOMY      ARTERIOVENOUS FISTULA/SHUNT SURGERY Left 05/10/2022    Procedure: Left basilic vein transposition;  Surgeon: Wan Barksdale MD;  Location: [facility] MAIN OR;  Service: Vascular;  Laterality: Left;    ARTERIOVENOUS FISTULA/SHUNT SURGERY Left 3/23/2023    Procedure: Ligation of left arm arteriovenous fistula;  Surgeon: Wan Barksdale MD;  Location: [facility] MAIN OR;  Service: Vascular;  Laterality: Left;    ARTERIOVENOUS FISTULA/SHUNT SURGERY Left 11/30/2023    Procedure: Creation of left arm arteriovenous graft;  Surgeon: Wan Barksdale MD;  Location: [facility] MAIN OR;  Service: Vascular;  Laterality: Left;    BRONCHOSCOPY N/A 4/24/2024    Procedure: BRONCHOSCOPY WITH BAL AND WASHINGS;  Surgeon: Khalif Yanez MD;  Location: [facility] ENDOSCOPY;  Service: Pulmonary;  Laterality: N/A;  MUCUS PLUGGING    CARDIAC CATHETERIZATION   1996    CARDIAC SURGERY      CARDIAC SURGERY      fluid drained from heart    CATARACT EXTRACTION, BILATERAL  2003    COLONOSCOPY  2014    ENDOSCOPY  2016 2019    FEMORAL ARTERY STENT Bilateral     HYSTERECTOMY      LUNG BIOPSY Left 2005    lobectomy upper lung caner    LUNG VOLUME REDUCTION      OTHER SURGICAL HISTORY      artifical joints/limbs    REPLACEMENT TOTAL KNEE Left 2016    UPPER GASTROINTESTINAL ENDOSCOPY         Family History: family history includes Arthritis in her father and mother; Cancer in her brother, brother, brother, and brother; Diabetes in her brother; Other in her brother; Prostate cancer in her brother and brother. Otherwise pertinent FHx was reviewed and not pertinent to current issue.    Social History:  reports that she quit smoking about 19 years ago. Her smoking use included cigarettes. She started smoking about 72 years ago. She has a 52.5 pack-year smoking history. She has been exposed to tobacco smoke. She has never used smokeless tobacco. She reports that she does not drink alcohol and does not use drugs.    Home Medications:  acetaminophen, albuterol sulfate HFA, arformoterol, aspirin, budesonide, carvedilol, dexlansoprazole, furosemide, ipratropium-albuterol, isosorbide dinitrate, levocetirizine, linagliptin, losartan, nitroglycerin, revefenacin, rosuvastatin, saccharomyces boulardii, sevelamer, and tobramycin PF    Allergies:  No Known Allergies    Objective    Objective     Vitals:   Temp:  [97.3 °F (36.3 °C)-97.7 °F (36.5 °C)] 97.3 °F (36.3 °C)  Heart Rate:  [] 86  Resp:  [23-31] 23  BP: (121-202)/(41-72) 151/58  Flow (L/min):  [6] 6     Physical Exam    Constitutional: Awake, alert, in respiratory distress, on BiPAP.   Eyes: sclerae anicteric, no conjunctival injection   HENT: mucous membranes moist   Neck: Supple, no thyromegaly, no lymphadenopathy, trachea midline, + JVD, full EJ bilaterally.   Respiratory: Bilateral upper rhonchi and scattered Rales, decreased  diffusely, no wheezing.  Tachypneic on BiPAP.   Cardiovascular: RRR, no murmurs, rubs, or gallops.   Gastrointestinal: Positive bowel sounds, soft, nontender, nondistended   Musculoskeletal: 1+ edema, no clubbing or cyanosis, right IJ TDC.  Left upper extremity AV graft, patent.   Psychiatric: Appropriate affect, cooperative   Neurologic: Oriented x 3, moving all extremities, Cranial Nerves grossly intact, speech clear   Skin: warm and dry, no rashes     Result Review    Result Reviewed:  I have personally reviewed the results from the time of this admission to 5/30/2024 13:54 EDT and agree with these findings:  [x]  Laboratory  []  Microbiology  [x]  Radiology  []  EKG/Telemetry   []  Cardiology/Vascular   []  Pathology  [x]  Old records  []  Other:  LAB RESULTS:    LAB RESULTS:        Lab 05/30/24  0729 05/30/24  0559   SODIUM  --  136   SODIUM, ARTERIAL 134.9*  --    POTASSIUM  --  4.3   CHLORIDE  --  96*   CO2  --  35.2*   BUN  --  23   CREATININE  --  2.04*   GLUCOSE  --  268*   GLUCOSE, ARTERIAL 213*  --    EGFR  --  23.2*   ANION GAP  --  4.8*           Most notable findings include: Elevated BNP, troponins trending upward.  Potassium okay.  Chest x-ray reviewed.    Assessment & Plan   Assessment / Plan     Brief Patient Summary:  Charlette Rai is a 89 y.o. female who has end-stage renal disease here with acute respiratory failure, currently on BiPAP.  Being treated for pneumonia.  By exam she has increased volume.    Active Hospital Problems:  Active Hospital Problems    Diagnosis     **Acute on chronic respiratory failure with hypoxia and hypercapnia        Assessment and Plan:   - ESRD, dialysis MWF through right IJ TDC, [place], [place].  Here with acute respiratory failure I suspect she has flash pulmonary edema might be precipitated by bilateral renal artery stenosis seen previously on CT angiogram.  Will plan dialysis today for 3 hours, try ultrafiltration of 3 kg.  May need dialysis again  tomorrow.  Given that she had multiple similar presentation and admission with acute respiratory failure/pulmonary edema, would recommend vascular evaluation to address renal artery stenosis (angioplasty+/- stenting).  Check strict I's and O's and daily weight.  Low phosphorus and low sodium diet.  Patient would rather not use her AV graft until she is feeling better.  Will use TDC for dialysis today.  - Severe hypertension, acute on chronic, she is in respiratory distress, should improve after dialysis.  Resuming blood pressure medications.  - Possible pneumonia right lower quadrant, on antibiotics, per primary.  - Anemia of chronic kidney disease, hemoglobin at goal.  - Severe peripheral vascular disease  - Diastolic dysfunction with LVH and moderate aortic stenosis from previous echo.  - Type 2 diabetes with complications.  Discussed with primary and with dialysis staff.    Will follow.  Please call with any questions.    Thank you very much for this consult!    Electronically signed by Edi García MD, 5/30/2024, 13:54 EDT.

## 2024-05-30 NOTE — ED PROVIDER NOTES
Time: 6:12 AM EDT  Date of encounter:  5/30/2024  Independent Historian/Clinical History and Information was obtained by:   Patient and Family    History is limited by: Acuity of Condition    Chief Complaint: Shortness of breath.      History of Present Illness:  Patient is a 89 y.o. year old female who presents to the emergency department for evaluation of shortness of breath.  This patient has a history of multiple medical problems and recently returned home from a rehab facility after hospitalization.  The patient was hospitalized for Pseudomonas pneumonia end-stage renal disease COPD confusion and weakness from May 1 through May 7 of this year.  She then went to [facility] for rehabilitation and currently resides at home.  The patient was formally seeing Dr. Lyons however after being treated in the rehab facility by Dr. Mosqueda family indicates that she is now a patient of Dr. Edmundo Mosqueda.    The patient has end-stage renal disease and she is on dialysis.  Today she presents with increasing shortness of breath.  The patient is normally on 3 and half liters of oxygen at home however this morning it was noted by EMS that her oxygen saturations were in the low 80 percentile on her normal 3 and half liters.  They put the patient on 10 L via nonrebreather and gave her DuoNebs and Zofran.  According to family and the patient she has had no fevers or chest pain however she has had significant shortness of breath.  Upon arrival to the emergency department the patient was placed on BiPAP because of her respiratory distress.  She is currently on BiPAP.    HPI    Patient Care Team  Primary Care Provider: Rafael Lyons MD    Past Medical History:     No Known Allergies  Past Medical History:   Diagnosis Date    Allergic rhinitis     Anaphylactic shock, unspecified, initial encounter 12/05/2023    Anemia     Arthritis     Asthma     USE INHALERS AND NEBULIZERS    Back pain     Bladder disorder     Cancer     LEFT  LUNG CANCER 2005 SURGERY AND CHEMO DONE  AND CURRENTLY 4/ 14/22  RIGHT LUNG CANCER HAS ONLY RECEIVED RADIATION THUS FAR    Chronic kidney disease     stage 3    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 DENIES CP BUT DOES HAVE SOA CHRONIC ISSUE COPD/LUNG CANCER    COPD (chronic obstructive pulmonary disease)     FOLLOWED BY DR MARIAJOSE BAILEY    Coronary artery disease     DENIES CP BUT DOES GET SOA MOST OF THE TIME WITH EXERTION BUT OCC AT REST CHRONIC ISSUE COPD/LUNG CANCER    Deep vein thrombosis     Diabetes mellitus     DOES NOT CHECK BS DAILY    Disease of thyroid gland     HYPOTHYROIDISM    Essential hypertension     Gastric ulcer     GERD (gastroesophageal reflux disease)     Heart murmur     History of transfusion     NO ISSUES POST TRANSFUSION WAS MANY YEARS AGO    Hyperlipemia     Leukocytopenia     FOLLOWED BY DR MIR BAILEY    Limb swelling     Lumbago     Lumbar spinal stenosis     Lung cancer     Migraine headache     Multiple joint pain     On home oxygen therapy     3L/NC PRN    Osteopenia     PNA (pneumonia) 05/04/2024    Pseudomonas pneumonia 04/29/2024    Reflux esophagitis     Shortness of breath     Thyroid nodule     HAS ULTRASOUND YEARLY BEING MONITORED    Vascular disease     Vitamin D deficiency      Past Surgical History:   Procedure Laterality Date    ABDOMINAL SURGERY      APPENDECTOMY      ARTERIOVENOUS FISTULA/SHUNT SURGERY Left 05/10/2022    Procedure: Left basilic vein transposition;  Surgeon: Wan Barksdale MD;  Location: [facility] MAIN OR;  Service: Vascular;  Laterality: Left;    ARTERIOVENOUS FISTULA/SHUNT SURGERY Left 3/23/2023    Procedure: Ligation of left arm arteriovenous fistula;  Surgeon: Wan Barksdale MD;  Location: [facility] MAIN OR;  Service: Vascular;  Laterality: Left;    ARTERIOVENOUS FISTULA/SHUNT SURGERY Left  11/30/2023    Procedure: Creation of left arm arteriovenous graft;  Surgeon: Wan Barksdale MD;  Location: [facility] MAIN OR;  Service: Vascular;  Laterality: Left;    BRONCHOSCOPY N/A 4/24/2024    Procedure: BRONCHOSCOPY WITH BAL AND WASHINGS;  Surgeon: Khalif Yanez MD;  Location: [facility] ENDOSCOPY;  Service: Pulmonary;  Laterality: N/A;  MUCUS PLUGGING    CARDIAC CATHETERIZATION  1996    CARDIAC SURGERY      CARDIAC SURGERY      fluid drained from heart    CATARACT EXTRACTION, BILATERAL  2003    COLONOSCOPY  2014    ENDOSCOPY  2016 2019    FEMORAL ARTERY STENT Bilateral     HYSTERECTOMY      LUNG BIOPSY Left 2005    lobectomy upper lung caner    LUNG VOLUME REDUCTION      OTHER SURGICAL HISTORY      artifical joints/limbs    REPLACEMENT TOTAL KNEE Left 2016    UPPER GASTROINTESTINAL ENDOSCOPY       Family History   Problem Relation Age of Onset    Arthritis Mother     Arthritis Father     Cancer Brother     Diabetes Brother     Other Brother         blood disease     Prostate cancer Brother     Cancer Brother     Prostate cancer Brother     Cancer Brother     Cancer Brother     Malig Hyperthermia Neg Hx     Colon cancer Neg Hx        Home Medications:  Prior to Admission medications    Medication Sig Start Date End Date Taking? Authorizing Provider   albuterol sulfate  (90 Base) MCG/ACT inhaler Inhale 2 puffs Every 4 (Four) Hours As Needed for Wheezing.    Provider, MD Sarah   arformoterol (BROVANA) 15 MCG/2ML nebulizer solution Take 2 mL by nebulization 2 (Two) Times a Day for 30 days. 5/7/24 6/6/24  Rafael Lyons MD   aspirin 81 MG EC tablet Take 1 tablet by mouth Daily.    ProviderSarah MD   budesonide-formoterol (SYMBICORT) 160-4.5 MCG/ACT inhaler Inhale 2 puffs 2 (Two) Times a Day.    ProviderSarah MD   carvedilol (COREG) 12.5 MG tablet Take 1 tablet by mouth 2 (Two) Times a Day With Meals for 30 days. 5/7/24 6/6/24  Rafael Lyons MD   dexlansoprazole (DEXILANT) 60 MG capsule  Take 1 capsule by mouth Daily. 9/23/22   Sarah Parker MD   furosemide (LASIX) 20 MG tablet Take 1 tablet by mouth Daily.    Sarah Parker MD   ipratropium-albuterol (DUO-NEB) 0.5-2.5 mg/3 ml nebulizer Take 3 mL by nebulization Every 4 (Four) Hours As Needed for Shortness of Air or Wheezing for up to 30 days. 4/29/24 5/29/24  Rafael Lyons MD   isosorbide dinitrate (ISORDIL) 20 MG tablet Take 1 tablet by mouth 3 (Three) Times a Day for 30 days. 4/29/24 5/29/24  Rafael Lyons MD   levocetirizine (XYZAL) 5 MG tablet TAKE 1 TABLET DAILY  Patient taking differently: Take 1 tablet by mouth Every Evening. 6/23/23   Paloma James MD   linagliptin (Tradjenta) 5 MG tablet tablet Take 1 tablet by mouth Daily. 1/10/22   Paloma James MD   losartan (Cozaar) 25 MG tablet Take 2 tablets by mouth Daily. 5/1/24   Sarah Parker MD   nitroglycerin (NITROSTAT) 0.4 MG SL tablet Place 1 tablet under the tongue Every 5 (Five) Minutes As Needed for Chest Pain.    Sarah Parker MD   revefenacin (YUPELRI) 175 MCG/3ML nebulizer solution Take 3 mL by nebulization Daily for 30 days. 5/8/24 6/7/24  Rafael Lyons MD   rosuvastatin (CRESTOR) 20 MG tablet Take 1 tablet by mouth Daily. 7/7/23   León Sanchez MD   sevelamer (RENVELA) 800 MG tablet Take 1 tablet by mouth 3 (Three) Times a Day With Meals. 10/9/23   Sarah Parker MD   tobramycin PF (Moy) 300 MG/5ML nebulizer solution Take 5 mL by nebulization 2 (Two) Times a Day for 30 days. Cycle 30 days on, then 30 days off. 5/7/24 6/6/24  Lorraine Ross APRN Trulicity 1.5 MG/0.5ML solution pen-injector Inject 1.5 mg under the skin into the appropriate area as directed Every 14 (Fourteen) Days.    Sarah Parker MD        Social History:   Social History     Tobacco Use    Smoking status: Former     Current packs/day: 0.00     Average packs/day: 1 pack/day for 52.5 years (52.5 ttl pk-yrs)     Types: Cigarettes     Start  "date:      Quit date: 2004     Years since quittin.9     Passive exposure: Past    Smokeless tobacco: Never   Vaping Use    Vaping status: Never Used   Substance Use Topics    Alcohol use: Never    Drug use: Never         Review of Systems:  Review of Systems   Constitutional:  Positive for activity change, appetite change and fatigue. Negative for chills and fever.   HENT:  Negative for congestion, ear pain and sore throat.    Eyes:  Negative for pain.   Respiratory:  Positive for cough, shortness of breath and wheezing. Negative for chest tightness.    Cardiovascular:  Negative for chest pain.   Gastrointestinal:  Negative for abdominal pain, diarrhea, nausea and vomiting.   Genitourinary:  Negative for flank pain and hematuria.   Musculoskeletal:  Negative for joint swelling.   Skin:  Negative for pallor.   Neurological:  Negative for seizures and headaches.   All other systems reviewed and are negative.       Physical Exam:  /65 (BP Location: Right arm, Patient Position: Lying)   Pulse 75   Temp 97.3 °F (36.3 °C) (Axillary)   Resp 16   Ht 165.1 cm (65\")   Wt 68.7 kg (151 lb 7.3 oz)   LMP  (LMP Unknown)   SpO2 98%   BMI 25.20 kg/m²     Physical Exam  Vitals and nursing note reviewed.   Constitutional:       General: She is in acute distress.      Appearance: Normal appearance. She is ill-appearing.   HENT:      Head: Normocephalic and atraumatic.      Mouth/Throat:      Mouth: Mucous membranes are moist.   Eyes:      General: No scleral icterus.     Extraocular Movements: Extraocular movements intact.      Pupils: Pupils are equal, round, and reactive to light.   Cardiovascular:      Rate and Rhythm: Regular rhythm. Tachycardia present.      Pulses: Normal pulses.      Heart sounds: Normal heart sounds.   Pulmonary:      Effort: Pulmonary effort is normal. No respiratory distress.      Breath sounds: Examination of the right-middle field reveals decreased breath sounds. Examination of " the left-middle field reveals decreased breath sounds. Decreased breath sounds present.   Abdominal:      General: Abdomen is flat.      Palpations: Abdomen is soft.      Tenderness: There is no abdominal tenderness.   Musculoskeletal:         General: Normal range of motion.      Cervical back: Normal range of motion and neck supple.   Skin:     General: Skin is warm and dry.   Neurological:      Mental Status: She is alert and oriented to person, place, and time. Mental status is at baseline.                  Procedures:  Procedures      Medical Decision Making:      Comorbidities that affect care:    Cancer, Chronic Kidney Disease, Congestive Heart Failure, COPD    External Notes reviewed:    Previous Admission Note: Admission from May 1 through May 7 for pneumonia.      The following orders were placed and all results were independently analyzed by me:  Orders Placed This Encounter   Procedures    Blood Culture - Blood,    Blood Culture - Blood,    MRSA Screen, PCR (Inpatient) - Swab, Nares    XR Chest 1 View    [place] Draw    Comprehensive Metabolic Panel    BNP    Single High Sensitivity Troponin T    CBC Auto Differential    Lactic Acid, Plasma    ABG with Co-Ox and Electrolytes    High Sensitivity Troponin T 2Hr    CBC Auto Differential    Basic Metabolic Panel    High Sensitivity Troponin T    Protime-INR    aPTT    aPTT    Protime-INR    aPTT    aPTT    aPTT    Vancomycin, Random    Diet: Regular/House, Renal; Low Phosphorus, Low Sodium (2-3g); Fluid Consistency: Thin (IDDSI 0)    Undress & Gown    Vital Signs    Vital Signs    Intake & Output    Weigh Patient    Oral Care    Maintain IV Access    Telemetry - Place Orders & Notify Provider of Results When Patient Experiences Acute Chest Pain, Dysrhythmia or Respiratory Distress    Continuous Pulse Oximetry    Notify Provider Platelet Count Less Than 33558    Notify Provider if PTT Not in Therapeutic Range After 24 Hours    Stop Infusion & Notify Provider  if Bleeding Occurs    RN To Release aPTT Order 6 Hours After Heparin Bolus & 6 Hours After Any Heparin Rate Change    After 2 Consecutive Therapeutic aPTTs, Obtain aPTT Daily.  If a Rate Adjustment is Necessary, Resume Every 6 Hour aPTT Draws    Notify Provider Platelet Count Less Than 62392    Notify Provider if PTT Not in Therapeutic Range After 24 Hours    Stop Infusion & Notify Provider if Bleeding Occurs    RN To Release aPTT Order 6 Hours After Heparin Bolus & 6 Hours After Any Heparin Rate Change    After 2 Consecutive Therapeutic aPTTs, Obtain aPTT Daily.  If a Rate Adjustment is Necessary, Resume Every 6 Hour aPTT Draws    Notify Provider Platelet Count Less Than 94670    Notify Provider if PTT Not in Therapeutic Range After 24 Hours    Stop Infusion & Notify Provider if Bleeding Occurs    RN To Release aPTT Order 6 Hours After Heparin Bolus & 6 Hours After Any Heparin Rate Change    After 2 Consecutive Therapeutic aPTTs, Obtain aPTT Daily.  If a Rate Adjustment is Necessary, Resume Every 6 Hour aPTT Draws    Strict Intake & Output    Daily Weights    Code Status and Medical Interventions:    Hospitalist (on-call MD unless specified)    Inpatient Nephrology Consult    Inpatient Pulmonology Consult    Oxygen Therapy- Nasal Cannula; Titrate 1-6 LPM Per SpO2; 90 - 95%    NIPPV - Provider Settings    POC Glucose 4x Daily Before Meals & at Bedtime    POC Glucose Once    ECG 12 Lead ED Triage Standing Order; SOA    Adult Transthoracic Echo Complete W/ Cont if Necessary Per Protocol    Insert Peripheral IV    Insert Peripheral IV    Hemodialysis Inpatient    Inpatient Admission    CBC & Differential    Green Top (Gel)    Lavender Top    Gold Top - SST    Light Blue Top    Extra Tubes    Gray Top    CBC & Differential       Medications Given in the Emergency Department:  Medications   sodium chloride 0.9 % flush 10 mL (has no administration in time range)   aspirin EC tablet 81 mg (81 mg Oral Given 5/30/24 1403)    carvedilol (COREG) tablet 12.5 mg (has no administration in time range)   pantoprazole (PROTONIX) EC tablet 40 mg (has no administration in time range)   ipratropium-albuterol (DUO-NEB) nebulizer solution 3 mL (3 mL Nebulization Given 5/30/24 1347)   isosorbide dinitrate (ISORDIL) tablet 20 mg (20 mg Oral Not Given 5/30/24 1410)   losartan (COZAAR) tablet 50 mg (50 mg Oral Not Given 5/30/24 1408)   rosuvastatin (CRESTOR) tablet 20 mg (20 mg Oral Given 5/30/24 1403)   sevelamer (RENVELA) tablet 800 mg (800 mg Oral Given 5/30/24 1403)   tobramycin PF (JUAN) nebulizer solution 300 mg (300 mg Nebulization Not Given 5/30/24 1348)   sodium chloride 0.9 % flush 10 mL (10 mL Intravenous Given 5/30/24 1404)   sodium chloride 0.9 % flush 10 mL (has no administration in time range)   sodium chloride 0.9 % infusion 40 mL (has no administration in time range)   nitroglycerin (NITROSTAT) SL tablet 0.4 mg (has no administration in time range)   acetaminophen (TYLENOL) tablet 650 mg (has no administration in time range)   sennosides-docusate (PERICOLACE) 8.6-50 MG per tablet 2 tablet (has no administration in time range)     And   polyethylene glycol (MIRALAX) packet 17 g (has no administration in time range)     And   bisacodyl (DULCOLAX) EC tablet 5 mg (has no administration in time range)     And   bisacodyl (DULCOLAX) suppository 10 mg (has no administration in time range)   cefepime 2000 mg IVPB in 100 mL NS (VTB) (has no administration in time range)   Pharmacy to dose vancomycin (has no administration in time range)   methylPREDNISolone sodium succinate (SOLU-Medrol) 60 mg in sterile water (preservative free) 0.96 mL (60 mg Intravenous Given 5/30/24 1407)   dextrose (GLUTOSE) oral gel 15 g (has no administration in time range)   dextrose (D50W) (25 g/50 mL) IV injection 25 g (has no administration in time range)   glucagon (GLUCAGEN) injection 1 mg (has no administration in time range)   Insulin Lispro (humaLOG) injection  2-7 Units (2 Units Subcutaneous Given 5/30/24 1409)   heparin 97605 units/250 mL (100 units/mL) in 0.45 % NaCl infusion ( Intravenous Canceled Entry 5/30/24 1200)   heparin bolus from bag solution 3,400 Units (0 Units Intravenous Hold 5/30/24 1052)   heparin bolus from bag solution 1,700 Units (has no administration in time range)   Vancomycin Pharmacy Intermittent/Pulse Dosing ( Does not apply Not Given 5/30/24 1409)   methylPREDNISolone sodium succinate (SOLU-Medrol) 125 mg in sterile water (preservative free) 2 mL (125 mg Intravenous Given 5/30/24 0606)   ipratropium-albuterol (DUO-NEB) nebulizer solution 3 mL (3 mL Nebulization Given 5/30/24 0617)   ipratropium-albuterol (DUO-NEB) nebulizer solution 3 mL (3 mL Nebulization Given 5/30/24 0615)   cefepime 2000 mg IVPB in 100 mL NS (VTB) (0 mg Intravenous Stopped 5/30/24 0854)   vancomycin 1250 mg/250 mL 0.9% NS IVPB ([facility]) (1,250 mg Intravenous New Bag 5/30/24 0921)   methylPREDNISolone sodium succinate (SOLU-Medrol) 125 mg in sterile water (preservative free) 2 mL (125 mg Intravenous Given 5/30/24 0824)   ipratropium-albuterol (DUO-NEB) nebulizer solution 3 mL (3 mL Nebulization Given 5/30/24 0726)   heparin bolus from bag solution 4,100 Units (4,100 Units Intravenous Bolus from Bag 5/30/24 1107)        ED Course:       EKG: Sinus tachycardia the rate of 118 bpm  Left atrial large  Left ventricular hypertrophy  Nonspecific ST changes  Normal axis  No acute ischemia.    Labs:    Lab Results (last 24 hours)       Procedure Component Value Units Date/Time    CBC & Differential [[id number]]  (Abnormal) Collected: 05/30/24 0559    Specimen: Blood Updated: 05/30/24 0610    Narrative:      The following orders were created for panel order CBC & Differential.  Procedure                               Abnormality         Status                     ---------                               -----------         ------                     CBC Auto Differential[[id number]]         Abnormal            Final result                 Please view results for these tests on the individual orders.    Comprehensive Metabolic Panel [[id number]]  (Abnormal) Collected: 05/30/24 0559    Specimen: Blood Updated: 05/30/24 0652     Glucose 268 mg/dL      BUN 23 mg/dL      Creatinine 2.04 mg/dL      Sodium 136 mmol/L      Potassium 4.3 mmol/L      Chloride 96 mmol/L      CO2 35.2 mmol/L      Calcium 8.6 mg/dL      Total Protein 6.4 g/dL      Albumin 3.7 g/dL      ALT (SGPT) 16 U/L      AST (SGOT) 24 U/L      Alkaline Phosphatase 191 U/L      Total Bilirubin 0.2 mg/dL      Globulin 2.7 gm/dL      A/G Ratio 1.4 g/dL      BUN/Creatinine Ratio 11.3     Anion Gap 4.8 mmol/L      eGFR 23.2 mL/min/1.73     Narrative:      GFR Normal >60  Chronic Kidney Disease <60  Kidney Failure <15    The GFR formula is only valid for adults with stable renal function between ages 18 and 70.    BNP [[id number]]  (Abnormal) Collected: 05/30/24 0559    Specimen: Blood Updated: 05/30/24 0648     proBNP 18,671.0 pg/mL     Narrative:      This assay is used as an aid in the diagnosis of individuals suspected of having heart failure. It can be used as an aid in the diagnosis of acute decompensated heart failure (ADHF) in patients presenting with signs and symptoms of ADHF to the emergency department (ED). In addition, NT-proBNP of <300 pg/mL indicates ADHF is not likely.    Age Range Result Interpretation  NT-proBNP Concentration (pg/mL:      <50             Positive            >450                   Gray                 300-450                    Negative             <300    50-75           Positive            >900                  Gray                300-900                  Negative            <300      >75             Positive            >1800                  Gray                300-1800                  Negative            <300    Single High Sensitivity Troponin T [[id number]]  (Abnormal) Collected: 05/30/24 0559    Specimen: Blood  Updated: 05/30/24 0724     HS Troponin T 66 ng/L     Narrative:      High Sensitive Troponin T Reference Range:  <14.0 ng/L- Negative Female for AMI  <22.0 ng/L- Negative Male for AMI  >=14 - Abnormal Female indicating possible myocardial injury.  >=22 - Abnormal Male indicating possible myocardial injury.   Clinicians would have to utilize clinical acumen, EKG, Troponin, and serial changes to determine if it is an Acute Myocardial Infarction or myocardial injury due to an underlying chronic condition.         CBC Auto Differential [[id number]]  (Abnormal) Collected: 05/30/24 0559    Specimen: Blood Updated: 05/30/24 0610     WBC 10.27 10*3/mm3      RBC 3.57 10*6/mm3      Hemoglobin 10.7 g/dL      Hematocrit 36.6 %      .5 fL      MCH 30.0 pg      MCHC 29.2 g/dL      RDW 14.5 %      RDW-SD 55.4 fl      MPV 9.6 fL      Platelets 176 10*3/mm3      Neutrophil % 64.7 %      Lymphocyte % 20.4 %      Monocyte % 12.0 %      Eosinophil % 1.7 %      Basophil % 0.8 %      Immature Grans % 0.4 %      Neutrophils, Absolute 6.65 10*3/mm3      Lymphocytes, Absolute 2.10 10*3/mm3      Monocytes, Absolute 1.23 10*3/mm3      Eosinophils, Absolute 0.17 10*3/mm3      Basophils, Absolute 0.08 10*3/mm3      Immature Grans, Absolute 0.04 10*3/mm3      nRBC 0.0 /100 WBC     Lactic Acid, Plasma [[id number]]  (Normal) Collected: 05/30/24 0559    Specimen: Blood Updated: 05/30/24 0717     Lactate 1.5 mmol/L     Protime-INR [[id number]]  (Normal) Collected: 05/30/24 0559    Specimen: Blood Updated: 05/30/24 1013     Protime 13.9 Seconds      INR 1.05    Narrative:      Suggested Therapeutic Ranges For Oral Anticoagulant Therapy:  Level of Therapy                      INR Target Range  Standard Dose                            2.0-3.0  High Dose                                2.5-3.5  Patients not receiving anticoagulant  Therapy Normal Range                     0.86-1.15    aPTT [[id number]]  (Abnormal) Collected: 05/30/24 0559    Specimen:  Blood Updated: 05/30/24 1013     PTT 28.2 seconds     ABG with Co-Ox and Electrolytes [[id number]]  (Abnormal) Collected: 05/30/24 0729    Specimen: Arterial Blood Updated: 05/30/24 0738     pH, Arterial 7.250 pH units      pCO2, Arterial 72.0 mm Hg      pO2, Arterial 156.0 mm Hg      HCO3, Arterial 30.9 mmol/L      Base Excess, Arterial 2.1 mmol/L      O2 Saturation, Arterial 98.3 %      Hemoglobin, Blood Gas 11.2 g/dL      Carboxyhemoglobin 0.6 %      Methemoglobin 0.00 %      Oxyhemoglobin 97.7 %      FHHB 1.7 %      Home's Test Positive     Site Arterial: right radial     Modality BiPap     FIO2 60 %      Sodium, Arterial 134.9 mmol/L      Potassium, Arterial 4.00 mmol/L      Ionized Calcium, Arterial 1.11 mmol/L      Chloride, Arterial 96 mmol/L      Glucose, Arterial 213 mg/dL      Lactate, Arterial 0.65 mmol/L      PO2/FIO2 260    Blood Culture - Blood, Arm, Right [[id number]] Collected: 05/30/24 0811    Specimen: Blood from Arm, Right Updated: 05/30/24 0837    High Sensitivity Troponin T 2Hr [[id number]]  (Abnormal) Collected: 05/30/24 0811    Specimen: Blood from Arm, Right Updated: 05/30/24 0916     HS Troponin T 108 ng/L      Troponin T Delta 42 ng/L     Narrative:      High Sensitive Troponin T Reference Range:  <14.0 ng/L- Negative Female for AMI  <22.0 ng/L- Negative Male for AMI  >=14 - Abnormal Female indicating possible myocardial injury.  >=22 - Abnormal Male indicating possible myocardial injury.   Clinicians would have to utilize clinical acumen, EKG, Troponin, and serial changes to determine if it is an Acute Myocardial Infarction or myocardial injury due to an underlying chronic condition.         Blood Culture - Blood, Hand, Right [[id number]] Collected: 05/30/24 0818    Specimen: Blood from Hand, Right Updated: 05/30/24 0837    High Sensitivity Troponin T [[id number]]  (Abnormal) Collected: 05/30/24 1229    Specimen: Blood from Hand, Right Updated: 05/30/24 1323     HS Troponin T 163 ng/L      Narrative:      High Sensitive Troponin T Reference Range:  <14.0 ng/L- Negative Female for AMI  <22.0 ng/L- Negative Male for AMI  >=14 - Abnormal Female indicating possible myocardial injury.  >=22 - Abnormal Male indicating possible myocardial injury.   Clinicians would have to utilize clinical acumen, EKG, Troponin, and serial changes to determine if it is an Acute Myocardial Infarction or myocardial injury due to an underlying chronic condition.         Protime-INR [[id number]]  (Normal) Collected: 05/30/24 1229    Specimen: Blood from Hand, Right Updated: 05/30/24 1303     Protime 13.6 Seconds      INR 1.02    Narrative:      Suggested Therapeutic Ranges For Oral Anticoagulant Therapy:  Level of Therapy                      INR Target Range  Standard Dose                            2.0-3.0  High Dose                                2.5-3.5  Patients not receiving anticoagulant  Therapy Normal Range                     0.86-1.15    aPTT [[id number]]  (Abnormal) Collected: 05/30/24 1229    Specimen: Blood from Hand, Right Updated: 05/30/24 1303     PTT 31.2 seconds     POC Glucose Once [[id number]]  (Abnormal) Collected: 05/30/24 1401    Specimen: Blood Updated: 05/30/24 1404     Glucose 190 mg/dL      Comment: Serial Number: [id number]:  [id number]                Imaging:    XR Chest 1 View    Result Date: 5/30/2024  AP PORTABLE CHEST  HISTORY: Shortness of air. History of left lung cancer.  COMPARISON: 5/1/2024  TECHNIQUE: AP portable chest x-ray.  FINDINGS: Dialysis catheter tip remains in the lower SVC. There is extensive atherosclerotic calcification in the aorta. Postoperative changes are noted in the left lung. Pleural plaques and pleural thickening in the left hemithorax are similar to prior. Areas of chronic consolidation/scarring in the left lung also appears stable. 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 There are new infiltrates at the right lung base that likely reflect pneumonia. No  pneumothorax. Background COPD is present.      New right basilar infiltrates concerning for pneumonia. The remainder of the chest x-ray is largely stable from prior.  Electronically Signed By-Dr. Weston Castillo MD On:5/30/2024 6:08 AM         Differential Diagnosis and Discussion:    Dyspnea: Differential diagnosis includes but is not limited to metabolic acidosis, neurological disorders, psychogenic, asthma, pneumothorax, upper airway obstruction, COPD, pneumonia, noncardiogenic pulmonary edema, interstitial lung disease, anemia, congestive heart failure, and pulmonary embolism    All labs were reviewed and interpreted by me.  EKG was interpreted by me.    MDM     Amount and/or Complexity of Data Reviewed  Clinical lab tests: reviewed  Tests in the radiology section of CPT®: reviewed  Tests in the medicine section of CPT®: reviewed         Critical Care Note: Total Critical Care time of 45 minutes. Total critical care time documented does not include time spent on separately billed procedures for services of nurses or physician assistants. I personally saw and examined the patient. I have reviewed all diagnostic interpretations and treatment plans as written. I was present for the key portions of any procedures performed and the inclusive time noted in any critical care statement. Critical care time includes patient management by me, time spent at the patients bedside,  time to review lab and imaging results, discussing patient care, documentation in the medical record, and time spent with family or caregiver.    Sepsis criteria was met in the emergency department and the Sepsis protocol (including antibiotic administration) was initiated.      SIRS criteria considered:   1.  Temperature > 100.4 or <96.8    2.  Heart Rate > 90    3.  Respiratory Rate > 22    4.  WBC > 12K or <4K.             Severe Sepsis:     Respiratory: Mechanical Ventilation or Bipap  Hypotension: SBP > 90 or MAP < 65  Renal: Creatinine >  2  Metabolic: Lactic Acid > 2  Hematologic: Platelets < 100K or INR > 1.5  Hepatic: BILI  >  2  CNS: Sudden AMS     Septic Shock:     Severe Sepsis + Persistent hypotension or Lactic Acid > 4     Normal saline bolus, Antibiotics, and final disposition was based on these definitions.        Sepsis was recognized at 0700    Antibiotics were ordered.     30 cc/kg bolus was not indicated-the patient is a dialysis patient and she appears fluid overloaded..       Patient did not receive the recommended 30ml/kg fluid bolus for sepsis because it would be harmful or detrimental to the patient.    The patient has concern for fluid overload and Renal Failure.   The patient was ordered 250 of fluids.    Total Critical Care time of 45 minutes. Total critical care time documented does not include time spent on separately billed procedures for services of nurses or physician assistants. I personally saw and examined the patient. I have reviewed all diagnostic interpretations and treatment plans as written. I was present for the key portions of any procedures performed and the inclusive time noted in any critical care statement. Critical care time includes patient management by me, time spent at the patients bedside,  time to review lab and imaging results, discussing patient care, documentation in the medical record, and time spent with family or caregiver.    Patient Care Considerations:    CT CHEST: I considered ordering a CT scan of the chest, however the patient appears to have pneumonia on her chest x-ray.      Consultants/Shared Management Plan:    Hospitalist: I have discussed the case with hospitalist who agrees to accept the patient for admission.    Social Determinants of Health:    Patient is unable to carry out activities of daily life. Escalation of care is necessary.       Disposition and Care Coordination:    Admit:   Through independent evaluation of the patient's history, physical, and imperical data, the patient  meets criteria for inpatient admission to the hospital.        Final diagnoses:   Pneumonia due to infectious organism, unspecified laterality, unspecified part of lung   Hypoxia   Acute respiratory failure with hypercapnia        ED Disposition       ED Disposition   Decision to Admit    Condition   --    Comment   Level of Care: Telemetry [5]   Diagnosis: Acute on chronic respiratory failure with hypoxia and hypercapnia [[id number]]   Certification: I Certify That Inpatient Hospital Services Are Medically Necessary For Greater Than 2 Midnights                 This medical record created using voice recognition software.             Jose Manuel Saunders, DO  05/30/24 1552

## 2024-05-30 NOTE — PROGRESS NOTES
RT EQUIPMENT DEVICE RELATED - SKIN ASSESSMENT    RT Medical Equipment/Device:     NIV Mask:  Full-face    size: s    Skin Assessment:      Cheek:  Intact  Chin:  Intact  Nares:  Intact  Neck:  Intact  Mouth:  Intact    Device Skin Pressure Protection:  Pressure points protected    Nurse Notification:  Gladis Leal, RRT

## 2024-05-30 NOTE — PAYOR COMM NOTE
"Charlette Rai (89 y.o. Female)   PATIENT INFORMATION  Name:  Charlette Rai  MRN#:     [id number]  :  1935     ADMISSION INFORMATION  CLASS: Inpatient   DOS:  24    CURRENT ATTENDING PROVIDER INFORMATION  Name/NPI: Gee Cee MD (NPI: [id number])   Phone:             Phone: [phone]    REQUESTING PROVIDER and RENDERING FACILITY  Name:  [facility]   NPI:  [id number]  TID:  [id number]  Address:      [facility] [place] [address]  Phone  [phone]      UTILIZATION REVIEW CONTACT INFORMATION  Phone:      [phone]  Fax:           [phone]      ADMISSION DIAGNOSIS  Acute on chronic respiratory failure with hypoxia and hypercapnia J96.21, J96.22  Pneumonia J18.9    ++++++++++++++++++++++++++++++++++++++++++++++++++++++++++++++++++++++++++++++++        Date of Birth   1935    Social Security Number       Address   [address] [id number]    Home Phone   [phone]    MRN   [id number]       Yazidism   Islam    Marital Status                               Admission Date   24    Admission Type   Emergency    Admitting Provider       Attending Provider   Jose Manuel Saunders DO    Department, Room/Bed   [facility] EMERGENCY ROOM,        Discharge Date       Discharge Disposition       Discharge Destination                                 Attending Provider: Jose Manuel Saunders DO    Allergies: No Known Allergies    Isolation: None   Infection: None   Code Status: CPR    Ht: 165.1 cm (65\")   Wt: 68.7 kg (151 lb 7.3 oz)    Admission Cmt: None   Principal Problem: Acute on chronic respiratory failure with hypoxia and hypercapnia [J96.21,J96.22]                   Active Insurance as of 2024       Primary Coverage       Payor Plan Insurance Group Employer/Plan Group    HUMANA MEDICARE REPLACEMENT HUMANA MED ADV GROUP [id number]       Payor Plan Address Payor Plan Phone Number Payor Plan Fax Number Effective " Dates    [address] [address] [phone]  1/1/2024 - None Entered    [facility] [place] [address]         Subscriber Name Subscriber Birth Date Member ID       JOSEFINA RIOS 1935 [id number]                    Respiratory Failure GRG Clinical Indications for Admission to Inpatient Care       Indications Met   Last updated by Tanvi Dietrich RN on 5/30/2024 1036     Review Status Created By   Primary Completed Tanvi Dietrich RN      Criteria Review   Respiratory Failure GRG Clinical Indications for Admission to Inpatient Care     Overall Determination: Indications Met     Criteria:  [×] Hospital admission is needed for appropriate care of the patient because of  1 or more  of the following  (1) (2) (3) (4) (5) (6) (7) (8) (9) (10):      [×] New (acute) need for mechanical invasive or noninvasive (eg, bilevel positive airway pressure (BPAP), volume-assured pressure support (VAPS), or volume control) ventilation (11) (12) (13) (14) (15) (16) (17) (18) (19)          5/30/2024 10:36 AM              -- 5/30/2024 10:36 AM by Tanvi Dietrich RN --                  BPAP      [×] Severe ventilation deficit, as indicated by  1 or more  of the following  (17) (22):          [×] Hypercarbia (PCO2 greater than 40 mm Hg (5.3 kPa))-induced respiratory acidosis (pH less than 7.35)              5/30/2024 10:36 AM                  -- 5/30/2024 10:36 AM by Tanvi Dietrich RN --                      pCO2 72. pH 7.25     Notes:  -- 5/30/2024 10:36 AM by Tanvi Dietrich RN --      To ED c/o awakening w/severe SOA & productive cough this am. Pt is on 3-3.5L O2 at home. When EMS arrived, sats in low 80s on her usual O2.       On arrival, , Resp 27-31, Pt on 100% NRB per EMS.       Lungs with rales, rhonchi & wheezing.  3+ BLE edema.       Pt was recently admitted for Pseudomonas PNA then transferred to rehab. Was doing better & was d/c home from Rehab 3 days ago.                   PMHx: Lung cancer with chemo/radiation, COPD, CAD, ESRD on HD,  HTN.                   ED results:       ABGs: pH 7.25, pCO2 72, HCO3 30.9.       Troponin 66, BNP 18,671.0.       Anion gap 4.8, pCO2 35.       Creatinine 2.04. GFR 23.2.       Glucose 268.       H/H 10.7/36.6      CXR: RLL PNA.      EKG: ST Rate 118 with LVH. Anterior Q waves.                   Per EMS: O2 100% NRB, Zofran, Duoneb.       In ED: BIPAP w/60% FiO2,  Duoneb x3, Cefepime IV, Vanc 1.25gm IV; SoluMedrol IVx2.                   Admit:       Telemetry.       Consult: Pulmonology, Nephrology.       BIPAP, O2      ECHO.       SoluMedrol IV q8h.       Vanc IV      Cefepime 2gm IV qd.       Duonebs q4h.       JUAN neb bid.       Labs in AM.           History & Physical        Gee Cee MD at [address] [place] [address] HISTORY AND PHYSICAL  Date: 2024   Patient Name: Charlette Rai  : 1935  MRN: [id number]  Primary Care Physician:  Rafael Lyons MD  Date of admission: 2024    Subjective  Subjective     Chief Complaint: Shortness of breath    HPI:    Charlette Rai is a 89 y.o. female with history of left lung cancer status post resection and chemo, right lung cancer status postradiation, COPD exacerbation, CAD, ESRD on HD via chest port every MWF, hypertension, who was recently admitted to the hospital for Pseudomonas pneumonia and was discharged to rehab.  Per family patient was doing well at rehab and was discharged from rehab on Monday.  Patient went home and was doing well until this morning when she started to experience severe shortness of breath.  She was coughing and was bringing up phlegm.  Patient was brought to the ER by her family.  On presentation patient was hypertensive with blood pressure of 202/72, she was saturating in the low 80s on 3 L oxygen via nasal cannula.  ABG showed pH of 7.2 with pCO2 of 72.  Patient was put on BiPAP.  Patient says since she has been on BiPAP she has started to feel better.  Chest x-ray showed new right lung  opacities.  Patient also received cefepime and vancomycin in the ER.  She denies nausea, vomiting, diarrhea or constipation.  She denies chest pain.      Personal History     Past Medical History:  Past Medical History:   Diagnosis Date    Allergic rhinitis     Anaphylactic shock, unspecified, initial encounter 12/05/2023    Anemia     Arthritis     Asthma     USE INHALERS AND NEBULIZERS    Back pain     Bladder disorder     Cancer     LEFT LUNG CANCER 2005 SURGERY AND CHEMO DONE  AND CURRENTLY 4/ 14/22  RIGHT LUNG CANCER HAS ONLY RECEIVED RADIATION THUS FAR    Chronic kidney disease     stage 3    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 DENIES CP BUT DOES HAVE SOA CHRONIC ISSUE COPD/LUNG CANCER    COPD (chronic obstructive pulmonary disease)     FOLLOWED BY DR MARIAJOSE BAILEY    Coronary artery disease     DENIES CP BUT DOES GET SOA MOST OF THE TIME WITH EXERTION BUT OCC AT REST CHRONIC ISSUE COPD/LUNG CANCER    Deep vein thrombosis     Diabetes mellitus     DOES NOT CHECK BS DAILY    Disease of thyroid gland     HYPOTHYROIDISM    Essential hypertension     Gastric ulcer     GERD (gastroesophageal reflux disease)     Heart murmur     History of transfusion     NO ISSUES POST TRANSFUSION WAS MANY YEARS AGO    Hyperlipemia     Leukocytopenia     FOLLOWED BY DR MIR BAILEY    Limb swelling     Lumbago     Lumbar spinal stenosis     Lung cancer     Migraine headache     Multiple joint pain     On home oxygen therapy     3L/NC PRN    Osteopenia     PNA (pneumonia) 05/04/2024    Pseudomonas pneumonia 04/29/2024    Reflux esophagitis     Shortness of breath     Thyroid nodule     HAS ULTRASOUND YEARLY BEING MONITORED    Vascular disease     Vitamin D deficiency        Past Surgical History:  Past Surgical History:   Procedure Laterality Date    ABDOMINAL SURGERY       APPENDECTOMY      ARTERIOVENOUS FISTULA/SHUNT SURGERY Left 05/10/2022    Procedure: Left basilic vein transposition;  Surgeon: Wan Barksdale MD;  Location: [facility] MAIN OR;  Service: Vascular;  Laterality: Left;    ARTERIOVENOUS FISTULA/SHUNT SURGERY Left 3/23/2023    Procedure: Ligation of left arm arteriovenous fistula;  Surgeon: Wan Barksdale MD;  Location: [facility] MAIN OR;  Service: Vascular;  Laterality: Left;    ARTERIOVENOUS FISTULA/SHUNT SURGERY Left 11/30/2023    Procedure: Creation of left arm arteriovenous graft;  Surgeon: Wan Barksdale MD;  Location: [facility] MAIN OR;  Service: Vascular;  Laterality: Left;    BRONCHOSCOPY N/A 4/24/2024    Procedure: BRONCHOSCOPY WITH BAL AND WASHINGS;  Surgeon: Khalif Yanez MD;  Location: [facility] ENDOSCOPY;  Service: Pulmonary;  Laterality: N/A;  MUCUS PLUGGING    CARDIAC CATHETERIZATION  1996    CARDIAC SURGERY      CARDIAC SURGERY      fluid drained from heart    CATARACT EXTRACTION, BILATERAL  2003    COLONOSCOPY  2014    ENDOSCOPY  2016 2019    FEMORAL ARTERY STENT Bilateral     HYSTERECTOMY      LUNG BIOPSY Left 2005    lobectomy upper lung caner    LUNG VOLUME REDUCTION      OTHER SURGICAL HISTORY      artifical joints/limbs    REPLACEMENT TOTAL KNEE Left 2016    UPPER GASTROINTESTINAL ENDOSCOPY         Family History:   Family History   Problem Relation Age of Onset    Arthritis Mother     Arthritis Father     Cancer Brother     Diabetes Brother     Other Brother         blood disease     Prostate cancer Brother     Cancer Brother     Prostate cancer Brother     Cancer Brother     Cancer Brother     Malig Hyperthermia Neg Hx     Colon cancer Neg Hx        Social History:   Social History     Socioeconomic History    Marital status:    Tobacco Use    Smoking status: Former     Current packs/day: 0.00     Average packs/day: 1 pack/day for 52.5 years (52.5 ttl pk-yrs)     Types: Cigarettes     Start date: 1952     Quit date: 6/23/2004     Years since  quittin.9     Passive exposure: Past    Smokeless tobacco: Never   Vaping Use    Vaping status: Never Used   Substance and Sexual Activity    Alcohol use: Never    Drug use: Never    Sexual activity: Defer       Home Medications:  acetaminophen, albuterol sulfate HFA, arformoterol, aspirin, budesonide, carvedilol, dexlansoprazole, furosemide, ipratropium-albuterol, isosorbide dinitrate, levocetirizine, linagliptin, losartan, nitroglycerin, revefenacin, rosuvastatin, saccharomyces boulardii, sevelamer, and tobramycin PF    Allergies:  No Known Allergies    Review of Systems   All systems were reviewed and negative except for: Shortness of breath, cough    Objective  Objective     Vitals:   Temp:  [97.7 °F (36.5 °C)] 97.7 °F (36.5 °C)  Heart Rate:  [] 85  Resp:  [27-31] 27  BP: (121-202)/(41-72) 147/54  Flow (L/min):  [6] 6    Physical Exam    Constitutional: Awake, alert, mild distress, on BiPAP   Eyes: Pupils equal, sclerae anicteric, no conjunctival injection   HENT: NCAT, mucous membranes moist   Neck: Supple, no thyromegaly, no lymphadenopathy, trachea midline   Respiratory: Labored respiration, bilateral rhonchi and wheezing   Cardiovascular: RRR, no murmurs, rubs, or gallops, palpable pedal pulses bilaterally   Gastrointestinal: Positive bowel sounds, soft, nontender, nondistended   Musculoskeletal: 3+ b/l le edema, no clubbing or cyanosis to extremities   Psychiatric: Appropriate affect, cooperative   Neurologic: Oriented x 3, speech clear   Skin: No rashes     Result Review   Result Review:  I have personally reviewed the results from the time of this admission to 2024 09:56 EDT and agree with these findings:  [x]  Laboratory  [x]  Microbiology  [x]  Radiology  [x]  EKG/Telemetry   [x]  Cardiology/Vascular   []  Pathology  []  Old records  []  Other:      Assessment & Plan  Assessment / Plan     Assessment/Plan:     Acute on chronic hypoxemic and hypercapnic respiratory failure  Recent  Pseudomonas pneumonia  Healthcare associated pneumonia  Acute on chronic COPD exacerbation  History of lung cancer  -Continue cefepime and vancomycin  -Continue Solu-Medrol  -Continue DuoNebs every 4 hours  -Continue tobramycin nebulization  -Pulmonology consulted    End-stage renal disease on HD  -Last HD done yesterday  -Nephrology consulted    NSTEMI  CAD  Hypertension  HLD  Moderate aortic stenosis  -EKG with sinus tachycardia, ST depressions in lateral lateral leads, new when compared to previous EKG  -Will start heparin drip  -Trend troponin  -2D echo  -Continue aspirin, statin, carvedilol, losartan    Diabetes  -Low sliding scale insulin    GERD  -Continue PPI      DVT prophylaxis:  On heparin gtt      CODE STATUS:    Code Status (Patient has no pulse and is not breathing): CPR (Attempt to Resuscitate)  Medical Interventions (Patient has pulse or is breathing): Full Support  Comments: DNI      Admission Status:  I believe this patient meets inpatient status.    Electronically signed by Gee Cee MD, 05/30/24, 9:39 AM EDT.              Electronically signed by Gee Cee MD at 05/30/24 0958       Vital Signs (last day)       Date/Time Temp Temp src Pulse Resp BP Patient Position SpO2    05/30/24 1033 -- -- 84 23 -- Lying 100    05/30/24 1015 -- -- 84 -- 141/51 -- 100    05/30/24 0945 -- -- 85 -- 147/54 -- 100    05/30/24 0930 -- -- 80 -- 139/55 -- 100    05/30/24 0915 -- -- 87 -- 148/51 -- 100    05/30/24 0900 -- -- 80 -- 121/41 -- 100    05/30/24 0845 -- -- 87 -- 137/44 -- 100    05/30/24 0839 -- -- 95 -- 148/50 -- 100    05/30/24 0815 -- -- 98 -- 143/58 -- 100    05/30/24 0800 -- -- 98 -- 122/47 -- 100    05/30/24 0745 -- -- 101 -- 140/47 -- 99    05/30/24 0730 -- -- 103 -- 152/58 -- 99    05/30/24 0727 -- -- 107 27 -- Lying 97    05/30/24 0715 -- -- 107 -- 161/59 -- 98    05/30/24 0620 -- -- 115 30 -- -- 95    05/30/24 0615 -- -- 116 31 -- Lying 96    05/30/24 06:03:46 -- -- -- -- 202/72 Lying  --    05/30/24 06:03:15 97.7 (36.5) Oral -- -- -- -- --    05/30/24 05:55:11 -- -- 117 27 -- Lying 97          Current Facility-Administered Medications   Medication Dose Route Frequency Provider Last Rate Last Admin    heparin 90651 units/250 mL (100 units/mL) in 0.45 % NaCl infusion  12 Units/kg/hr Intravenous Titrated Gee Cee MD        heparin bolus from bag solution 1,700 Units  25 Units/kg Intravenous PRN Gee Cee MD        heparin bolus from bag solution 3,400 Units  50 Units/kg Intravenous PRN Gee Cee MD        heparin bolus from bag solution 4,100 Units  60 Units/kg Intravenous Once Gee Cee MD        sodium chloride 0.9 % flush 10 mL  10 mL Intravenous PRN Jose Manuel Saunders, DO         Current Outpatient Medications   Medication Sig Dispense Refill    acetaminophen (TYLENOL) 325 MG tablet Take 2 tablets by mouth Every 6 (Six) Hours As Needed for Mild Pain.      albuterol sulfate  (90 Base) MCG/ACT inhaler Inhale 2 puffs Every 4 (Four) Hours As Needed for Wheezing.      arformoterol (BROVANA) 15 MCG/2ML nebulizer solution Take 2 mL by nebulization 2 (Two) Times a Day for 30 days. 120 mL 0    aspirin 81 MG EC tablet Take 1 tablet by mouth Daily.      budesonide (PULMICORT) 0.5 MG/2ML nebulizer solution Take 2 mL by nebulization Daily.      carvedilol (COREG) 12.5 MG tablet Take 1 tablet by mouth 2 (Two) Times a Day With Meals for 30 days. 60 tablet 0    dexlansoprazole (DEXILANT) 60 MG capsule Take 1 capsule by mouth Daily.      furosemide (LASIX) 20 MG tablet Take 1 tablet by mouth Daily.      ipratropium-albuterol (DUO-NEB) 0.5-2.5 mg/3 ml nebulizer Take 3 mL by nebulization Every 4 (Four) Hours As Needed for Shortness of Air or Wheezing for up to 30 days. (Patient taking differently: Take 3 mL by nebulization Every 4 (Four) Hours As Needed for Shortness of Air or Wheezing. End date 6/8/24) 360 mL 2    isosorbide dinitrate (ISORDIL) 20 MG tablet Take 1 tablet  by mouth 3 (Three) Times a Day for 30 days. (Patient taking differently: Take 1 tablet by mouth 3 (Three) Times a Day. End date 6/8/24) 90 tablet 0    linagliptin (Tradjenta) 5 MG tablet tablet Take 1 tablet by mouth Daily. 90 tablet 1    losartan (Cozaar) 25 MG tablet Take 2 tablets by mouth Daily.      rosuvastatin (CRESTOR) 20 MG tablet Take 1 tablet by mouth Daily. 90 tablet 3    saccharomyces boulardii (FLORASTOR) 250 MG capsule Take 1 capsule by mouth 2 (Two) Times a Day.      sevelamer (RENVELA) 800 MG tablet Take 1 tablet by mouth 3 (Three) Times a Day With Meals.      tobramycin PF (Moy) 300 MG/5ML nebulizer solution Take 5 mL by nebulization 2 (Two) Times a Day for 30 days. Cycle 30 days on, then 30 days off. 300 mL 0    levocetirizine (XYZAL) 5 MG tablet TAKE 1 TABLET DAILY (Patient taking differently: Take 1 tablet by mouth Every Evening.) 90 tablet 1    nitroglycerin (NITROSTAT) 0.4 MG SL tablet Place 1 tablet under the tongue Every 5 (Five) Minutes As Needed for Chest Pain.      revefenacin (YUPELRI) 175 MCG/3ML nebulizer solution Take 3 mL by nebulization Daily for 30 days. 90 mL 0     Lab Results (last 24 hours)       Procedure Component Value Units Date/Time    Protime-INR [[id number]]  (Normal) Collected: 05/30/24 0559    Specimen: Blood Updated: 05/30/24 1013     Protime 13.9 Seconds      INR 1.05    Narrative:      Suggested Therapeutic Ranges For Oral Anticoagulant Therapy:  Level of Therapy                      INR Target Range  Standard Dose                            2.0-3.0  High Dose                                2.5-3.5  Patients not receiving anticoagulant  Therapy Normal Range                     0.86-1.15    aPTT [[id number]]  (Abnormal) Collected: 05/30/24 0559    Specimen: Blood Updated: 05/30/24 1013     PTT 28.2 seconds     High Sensitivity Troponin T 2Hr [[id number]]  (Abnormal) Collected: 05/30/24 0811    Specimen: Blood from Arm, Right Updated: 05/30/24 0916     HS Troponin T 108  ng/L      Troponin T Delta 42 ng/L     Narrative:      High Sensitive Troponin T Reference Range:  <14.0 ng/L- Negative Female for AMI  <22.0 ng/L- Negative Male for AMI  >=14 - Abnormal Female indicating possible myocardial injury.  >=22 - Abnormal Male indicating possible myocardial injury.   Clinicians would have to utilize clinical acumen, EKG, Troponin, and serial changes to determine if it is an Acute Myocardial Infarction or myocardial injury due to an underlying chronic condition.         Blood Culture - Blood, Hand, Right [[id number]] Collected: 05/30/24 0818    Specimen: Blood from Hand, Right Updated: 05/30/24 0837    Blood Culture - Blood, Arm, Right [[id number]] Collected: 05/30/24 0811    Specimen: Blood from Arm, Right Updated: 05/30/24 0837    ABG with Co-Ox and Electrolytes [[id number]]  (Abnormal) Collected: 05/30/24 0729    Specimen: Arterial Blood Updated: 05/30/24 0738     pH, Arterial 7.250 pH units      pCO2, Arterial 72.0 mm Hg      pO2, Arterial 156.0 mm Hg      HCO3, Arterial 30.9 mmol/L      Base Excess, Arterial 2.1 mmol/L      O2 Saturation, Arterial 98.3 %      Hemoglobin, Blood Gas 11.2 g/dL      Carboxyhemoglobin 0.6 %      Methemoglobin 0.00 %      Oxyhemoglobin 97.7 %      FHHB 1.7 %      Home's Test Positive     Site Arterial: right radial     Modality BiPap     FIO2 60 %      Sodium, Arterial 134.9 mmol/L      Potassium, Arterial 4.00 mmol/L      Ionized Calcium, Arterial 1.11 mmol/L      Chloride, Arterial 96 mmol/L      Glucose, Arterial 213 mg/dL      Lactate, Arterial 0.65 mmol/L      PO2/FIO2 260    Single High Sensitivity Troponin T [[id number]]  (Abnormal) Collected: 05/30/24 0559    Specimen: Blood Updated: 05/30/24 0724     HS Troponin T 66 ng/L     Narrative:      High Sensitive Troponin T Reference Range:  <14.0 ng/L- Negative Female for AMI  <22.0 ng/L- Negative Male for AMI  >=14 - Abnormal Female indicating possible myocardial injury.  >=22 - Abnormal Male indicating  possible myocardial injury.   Clinicians would have to utilize clinical acumen, EKG, Troponin, and serial changes to determine if it is an Acute Myocardial Infarction or myocardial injury due to an underlying chronic condition.         Lactic Acid, Plasma [[id number]]  (Normal) Collected: 05/30/24 0559    Specimen: Blood Updated: 05/30/24 0717     Lactate 1.5 mmol/L     Comprehensive Metabolic Panel [[id number]]  (Abnormal) Collected: 05/30/24 0559    Specimen: Blood Updated: 05/30/24 0652     Glucose 268 mg/dL      BUN 23 mg/dL      Creatinine 2.04 mg/dL      Sodium 136 mmol/L      Potassium 4.3 mmol/L      Chloride 96 mmol/L      CO2 35.2 mmol/L      Calcium 8.6 mg/dL      Total Protein 6.4 g/dL      Albumin 3.7 g/dL      ALT (SGPT) 16 U/L      AST (SGOT) 24 U/L      Alkaline Phosphatase 191 U/L      Total Bilirubin 0.2 mg/dL      Globulin 2.7 gm/dL      A/G Ratio 1.4 g/dL      BUN/Creatinine Ratio 11.3     Anion Gap 4.8 mmol/L      eGFR 23.2 mL/min/1.73     Narrative:      GFR Normal >60  Chronic Kidney Disease <60  Kidney Failure <15    The GFR formula is only valid for adults with stable renal function between ages 18 and 70.    BNP [[id number]]  (Abnormal) Collected: 05/30/24 0559    Specimen: Blood Updated: 05/30/24 0648     proBNP 18,671.0 pg/mL     Narrative:      This assay is used as an aid in the diagnosis of individuals suspected of having heart failure. It can be used as an aid in the diagnosis of acute decompensated heart failure (ADHF) in patients presenting with signs and symptoms of ADHF to the emergency department (ED). In addition, NT-proBNP of <300 pg/mL indicates ADHF is not likely.    Age Range Result Interpretation  NT-proBNP Concentration (pg/mL:      <50             Positive            >450                   Gray                 300-450                    Negative             <300    50-75           Positive            >900                  Gray                300-900                  Negative             <300      >75             Positive            >1800                  Gray                300-1800                  Negative            <300    Extra Tubes [[id number]] Collected: 05/30/24 0559    Specimen: Blood Updated: 05/30/24 0615    Narrative:      The following orders were created for panel order Extra Tubes.  Procedure                               Abnormality         Status                     ---------                               -----------         ------                     Florian Top[[id number]]                                         Final result                 Please view results for these tests on the individual orders.    [place] Draw [[id number]] Collected: 05/30/24 0559    Specimen: Blood Updated: 05/30/24 0615    Narrative:      The following orders were created for panel order [place] Draw.  Procedure                               Abnormality         Status                     ---------                               -----------         ------                     Green Top (Gel)[[id number]]                                  Final result               Lavender Top[[id number]]                                     Final result               Gold Top - SST[[id number]]                                   Final result               Light Blue Top[[id number]]                                   Final result                 Please view results for these tests on the individual orders.    Green Top (Gel) [[id number]] Collected: 05/30/24 0559    Specimen: Blood Updated: 05/30/24 0615     Extra Tube Hold for add-ons.     Comment: Auto resulted.       Lavender Top [[id number]] Collected: 05/30/24 0559    Specimen: Blood Updated: 05/30/24 0615     Extra Tube hold for add-on     Comment: Auto resulted       Gold Top - SST [[id number]] Collected: 05/30/24 0559    Specimen: Blood Updated: 05/30/24 0615     Extra Tube Hold for add-ons.     Comment: Auto resulted.       Light Blue Top [[id number]] Collected: 05/30/24  0559    Specimen: Blood Updated: 05/30/24 0615     Extra Tube Hold for add-ons.     Comment: Auto resulted       Gray Top [[id number]] Collected: 05/30/24 0559    Specimen: Blood Updated: 05/30/24 0615     Extra Tube Hold for add-ons.     Comment: Auto resulted.       CBC & Differential [[id number]]  (Abnormal) Collected: 05/30/24 0559    Specimen: Blood Updated: 05/30/24 0610    Narrative:      The following orders were created for panel order CBC & Differential.  Procedure                               Abnormality         Status                     ---------                               -----------         ------                     CBC Auto Differential[[id number]]        Abnormal            Final result                 Please view results for these tests on the individual orders.    CBC Auto Differential [[id number]]  (Abnormal) Collected: 05/30/24 0559    Specimen: Blood Updated: 05/30/24 0610     WBC 10.27 10*3/mm3      RBC 3.57 10*6/mm3      Hemoglobin 10.7 g/dL      Hematocrit 36.6 %      .5 fL      MCH 30.0 pg      MCHC 29.2 g/dL      RDW 14.5 %      RDW-SD 55.4 fl      MPV 9.6 fL      Platelets 176 10*3/mm3      Neutrophil % 64.7 %      Lymphocyte % 20.4 %      Monocyte % 12.0 %      Eosinophil % 1.7 %      Basophil % 0.8 %      Immature Grans % 0.4 %      Neutrophils, Absolute 6.65 10*3/mm3      Lymphocytes, Absolute 2.10 10*3/mm3      Monocytes, Absolute 1.23 10*3/mm3      Eosinophils, Absolute 0.17 10*3/mm3      Basophils, Absolute 0.08 10*3/mm3      Immature Grans, Absolute 0.04 10*3/mm3      nRBC 0.0 /100 WBC           Imaging Results (Last 24 Hours)       Procedure Component Value Units Date/Time    XR Chest 1 View [[id number]] Collected: 05/30/24 0606     Updated: 05/30/24 0610    Narrative:      AP PORTABLE CHEST     HISTORY:  Shortness of air. History of left lung cancer.     COMPARISON:  5/1/2024     TECHNIQUE:  AP portable chest x-ray.     FINDINGS:  Dialysis catheter tip remains in the lower  SVC. There is extensive  atherosclerotic calcification in the aorta. Postoperative changes are  noted in the left lung. Pleural plaques and pleural thickening in the  left hemithorax are similar to prior. Areas of chronic  consolidation/scarring in the left lung also appears stable. 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 There are new infiltrates at the right  lung base that likely reflect pneumonia. No pneumothorax. Background  COPD is present.       Impression:      New right basilar infiltrates concerning for pneumonia. The remainder of  the chest x-ray is largely stable from prior.     Electronically Signed By-Dr. Weston Castillo MD On:5/30/2024 6:08 AM             ECG/EMG Results (last 24 hours)       Procedure Component Value Units Date/Time    ECG 12 Lead ED Triage Standing Order; SOA [[id number]] Collected: 05/30/24 0558     Updated: 05/30/24 0559     QT Interval 343 ms      QTC Interval 481 ms     Narrative:      HEART RATE= 118  bpm  RR Interval= 508  ms  NE Interval= 138  ms  P Horizontal Axis= -65  deg  P Front Axis= -44  deg  QRSD Interval= 119  ms  QT Interval= 343  ms  QTcB= 481  ms  QRS Axis= 3  deg  T Wave Axis= 96  deg  - ABNORMAL ECG -  Sinus tachycardia  Probable left atrial enlargement  Left ventricular hypertrophy  Anterior Q waves, possibly due to LVH  Nonspecific T abnormalities, lateral leads  When compared with ECG of 01-May-2024 19:20:05,  Significant rate increase  Significant repolarization change  Significant axis, voltage or hypertrophy change  Electronically Signed By:   Date and Time of Study: 2024-05-30 05:58:00          Orders (last 24 hrs)        Start     Ordered    05/31/24 0600  CBC & Differential  Every Third Day      Comments: Discontinue After Heparin Stopped      05/30/24 1035    05/30/24 1100  heparin bolus from bag solution 4,100 Units  Once         05/30/24 1035    05/30/24 1100  heparin 84268 units/250 mL (100 units/mL) in 0.45 % NaCl infusion  Titrated          05/30/24 1035    05/30/24 1036  Initiate & Follow Heparin Protocol  Continuous         05/30/24 1035    05/30/24 1036  Notify Provider Platelet Count Less Than 67118  Continuous        Comments: Open Order Report to View Parameters Requiring Provider Notification    05/30/24 1035    05/30/24 1036  Notify Provider if PTT Not in Therapeutic Range After 24 Hours  Continuous        Comments: Open Order Report to View Parameters Requiring Provider Notification    05/30/24 1035    05/30/24 1036  Stop Infusion & Notify Provider if Bleeding Occurs  Continuous        Comments: Open Order Report to View Parameters Requiring Provider Notification    05/30/24 1035    05/30/24 1035  heparin bolus from bag solution 3,400 Units  As Needed         05/30/24 1035    05/30/24 1035  heparin bolus from bag solution 1,700 Units  As Needed         05/30/24 1035    05/30/24 0957  Initiate & Follow Heparin Protocol  Continuous         05/30/24 0956    05/30/24 0957  Notify Provider Platelet Count Less Than 96532  Continuous        Comments: Open Order Report to View Parameters Requiring Provider Notification    05/30/24 0956    05/30/24 0957  Notify Provider if PTT Not in Therapeutic Range After 24 Hours  Continuous        Comments: Open Order Report to View Parameters Requiring Provider Notification    05/30/24 0956    05/30/24 0957  Stop Infusion & Notify Provider if Bleeding Occurs  Continuous        Comments: Open Order Report to View Parameters Requiring Provider Notification    05/30/24 0956    05/30/24 0957  Protime-INR  STAT        Comments: Prior to Initial Heparin Bolus      05/30/24 0956    05/30/24 0957  aPTT  STAT        Comments: Prior to Initial Heparin Bolus      05/30/24 0956    05/30/24 0932  Inpatient Admission  Once         05/30/24 0937    05/30/24 0932  Code Status and Medical Interventions:  Continuous         05/30/24 0937    05/30/24 0830  Hospitalist (on-call MD unless specified)  Once        Specialty:   Hospitalist  Provider:  Gee Cee MD    05/30/24 0829    05/30/24 0759  High Sensitivity Troponin T 2Hr  PROCEDURE ONCE         05/30/24 0724    05/30/24 0730  cefepime 2000 mg IVPB in 100 mL NS (VTB)  Once         05/30/24 0706    05/30/24 0730  vancomycin 1250 mg/250 mL 0.9% NS IVPB ([facility])  Once         05/30/24 0706    05/30/24 0730  methylPREDNISolone sodium succinate (SOLU-Medrol) 125 mg in sterile water (preservative free) 2 mL  Once         05/30/24 0711    05/30/24 0730  ipratropium-albuterol (DUO-NEB) nebulizer solution 3 mL  Once         05/30/24 0711    05/30/24 0712  ABG with Co-Ox and Electrolytes  Once         05/30/24 0711    05/30/24 0706  Blood Culture - Blood, Hand, Right  Once         05/30/24 0706    05/30/24 0706  Blood Culture - Blood, Arm, Right  Once         05/30/24 0706    05/30/24 0706  Lactic Acid, Plasma  Once         05/30/24 0706    05/30/24 0630  NIPPV - Provider Settings  Until Discontinued         05/30/24 0629    05/30/24 0630  Continuous Pulse Oximetry  Continuous         05/30/24 0629    05/30/24 0615  methylPREDNISolone sodium succinate (SOLU-Medrol) 125 mg in sterile water (preservative free) 2 mL  Once         05/30/24 0556    05/30/24 0615  ipratropium-albuterol (DUO-NEB) nebulizer solution 3 mL  Once         05/30/24 0556    05/30/24 0615  ipratropium-albuterol (DUO-NEB) nebulizer solution 3 mL  Once         05/30/24 0556    05/30/24 0603  Extra Tubes  Once         05/30/24 0602    05/30/24 0603  Florian Top  PROCEDURE ONCE         05/30/24 0602    05/30/24 0555  NPO Diet NPO Type: Strict NPO  Diet Effective Now         05/30/24 0554    05/30/24 0555  Undress & Gown  Once         05/30/24 0554    05/30/24 0555  Cardiac Monitoring  Continuous        Comments: Follow Standing Orders As Outlined in Process Instructions (Open Order Report to View Full Instructions)    05/30/24 0554    05/30/24 0555  Continuous Pulse Oximetry  Continuous,   Status:  Canceled         05/30/24  0554    05/30/24 0555  Vital Signs  Per Hospital Policy         05/30/24 0554    05/30/24 0555  ECG 12 Lead ED Triage Standing Order; SOA  Once         05/30/24 0554    05/30/24 0555  XR Chest 1 View  1 Time Imaging         05/30/24 0554    05/30/24 0555  Insert Peripheral IV  Once         05/30/24 0554    05/30/24 0555  Noble Draw  Once         05/30/24 0554    05/30/24 0555  CBC & Differential  Once         05/30/24 0554    05/30/24 0555  Comprehensive Metabolic Panel  Once         05/30/24 0554    05/30/24 0555  BNP  Once         05/30/24 0554    05/30/24 0555  Single High Sensitivity Troponin T  Once         05/30/24 0554    05/30/24 0555  Green Top (Gel)  PROCEDURE ONCE         05/30/24 0555    05/30/24 0555  Lavender Top  PROCEDURE ONCE         05/30/24 0555    05/30/24 0555  Gold Top - SST  PROCEDURE ONCE         05/30/24 0555    05/30/24 0555  Light Blue Top  PROCEDURE ONCE         05/30/24 0555    05/30/24 0555  CBC Auto Differential  PROCEDURE ONCE         05/30/24 0555    05/30/24 0554  sodium chloride 0.9 % flush 10 mL  As Needed         05/30/24 0554    Unscheduled  Oxygen Therapy- Nasal Cannula; Titrate 1-6 LPM Per SpO2; 90 - 95%  Continuous PRN       05/30/24 0554    Unscheduled  aPTT  As Needed       05/30/24 0956    Unscheduled  RN To Release aPTT Order 6 Hours After Heparin Bolus & 6 Hours After Any Heparin Rate Change  As Needed       05/30/24 0956    Unscheduled  After 2 Consecutive Therapeutic aPTTs, Obtain aPTT Daily.  If a Rate Adjustment is Necessary, Resume Every 6 Hour aPTT Draws  As Needed       05/30/24 0956    Unscheduled  RN To Release aPTT Order 6 Hours After Heparin Bolus & 6 Hours After Any Heparin Rate Change  As Needed       05/30/24 1035    Unscheduled  After 2 Consecutive Therapeutic aPTTs, Obtain aPTT Daily.  If a Rate Adjustment is Necessary, Resume Every 6 Hour aPTT Draws  As Needed       05/30/24 1035    --  acetaminophen (TYLENOL) 325 MG tablet  Every 6 Hours PRN          05/30/24 0913    --  saccharomyces boulardii (FLORASTOR) 250 MG capsule  2 Times Daily         05/30/24 0913    --  budesonide (PULMICORT) 0.5 MG/2ML nebulizer solution  Daily - RT         05/30/24 0913    Signed and Held  aspirin EC tablet 81 mg  Daily         Signed and Held    Signed and Held  carvedilol (COREG) tablet 12.5 mg  2 Times Daily With Meals         Signed and Held    Signed and Held  pantoprazole (PROTONIX) EC tablet 40 mg  2 Times Daily Before Meals         Signed and Held    Signed and Held  ipratropium-albuterol (DUO-NEB) nebulizer solution 3 mL  Every 4 Hours         Signed and Held    Signed and Held  isosorbide dinitrate (ISORDIL) tablet 20 mg  3 Times Daily (Nitrates)         Signed and Held    Signed and Held  losartan (COZAAR) tablet 50 mg  Every 24 Hours         Signed and Held    Signed and Held  rosuvastatin (CRESTOR) tablet 20 mg  Daily         Signed and Held    Signed and Held  sevelamer (RENVELA) tablet 800 mg  3 Times Daily With Meals         Signed and Held    Signed and Held  tobramycin PF (JUAN) nebulizer solution 300 mg  2 Times Daily - RT         Signed and Held    Signed and Held  Vital Signs  Every 4 Hours       Signed and Held    Signed and Held  Intake & Output  Every Shift       Signed and Held    Signed and Held  Weigh Patient  Once         Signed and Held    Signed and Held  Oral Care  2 Times Daily       Signed and Held    Signed and Held  Insert Peripheral IV  Once         Signed and Held    Signed and Held  Saline Lock & Maintain IV Access  Continuous         Signed and Held    Signed and Held  sodium chloride 0.9 % flush 10 mL  Every 12 Hours Scheduled         Signed and Held    Signed and Held  sodium chloride 0.9 % flush 10 mL  As Needed         Signed and Held    Signed and Held  sodium chloride 0.9 % infusion 40 mL  As Needed         Signed and Held    Signed and Held  heparin (porcine) 5000 UNIT/ML injection 5,000 Units  Every 8 Hours Scheduled,   Status:   "Canceled         Signed and Held    Signed and Held  Continuous Cardiac Monitoring  Continuous        Comments: Follow Standing Orders As Outlined in Process Instructions (Open Order Report to View Full Instructions)    Signed and Held    Signed and Held  nitroglycerin (NITROSTAT) SL tablet 0.4 mg  Every 5 Minutes PRN         Signed and Held    Signed and Held  Maintain IV Access  Continuous         Signed and Held    Signed and Held  Telemetry - Place Orders & Notify Provider of Results When Patient Experiences Acute Chest Pain, Dysrhythmia or Respiratory Distress  Continuous        Comments: Open Order Report to View Parameters Requiring Provider Notification    Signed and Held    Signed and Held  NIPPV - Provider Settings  Until Discontinued         Signed and Held    Signed and Held  Continuous Pulse Oximetry  Continuous         Signed and Held    Signed and Held  Diet: Regular/House; Fluid Consistency: Thin (IDDSI 0)  Diet Effective Now         Signed and Held    Signed and Held  CBC Auto Differential  Morning Draw         Signed and Held    Signed and Held  Basic Metabolic Panel  Morning Draw         Signed and Held    Signed and Held  acetaminophen (TYLENOL) tablet 650 mg  Every 4 Hours PRN         Signed and Held    Signed and Held  sennosides-docusate (PERICOLACE) 8.6-50 MG per tablet 2 tablet  2 Times Daily PRN        Placed in \"And\" Linked Group    Signed and Held    Signed and Held  polyethylene glycol (MIRALAX) packet 17 g  Daily PRN        Placed in \"And\" Linked Group    Signed and Held    Signed and Held  bisacodyl (DULCOLAX) EC tablet 5 mg  Daily PRN        Placed in \"And\" Linked Group    Signed and Held    Signed and Held  bisacodyl (DULCOLAX) suppository 10 mg  Daily PRN        Placed in \"And\" Linked Group    Signed and Held    Signed and Held  cefepime 2000 mg IVPB in 100 mL NS (VTB)  Every 24 Hours         Signed and Held    Signed and Held  Pharmacy to dose vancomycin  Continuous PRN         " Signed and Held    Signed and Held  Inpatient Nephrology Consult  Once        Specialty:  Nephrology  Provider:  Edi García MD    Signed and Held    Signed and Held  Inpatient Pulmonology Consult  Once        Specialty:  Pulmonary Disease  Provider:  Khalif Yanez MD    Signed and Held    Signed and Held  methylPREDNISolone sodium succinate (SOLU-Medrol) 60 mg in sterile water (preservative free) 0.96 mL  Every 8 Hours         Signed and Held    Signed and Held  Follow Hypoglycemia Standing Orders For Blood Glucose <70 & Notify Provider of Treatment  As Needed        Comments: Follow Hypoglycemia Orders As Outlined in Process Instructions (Open Order Report to View Full Instructions)  Notify Provider Any Time Hypoglycemia Treatment is Administered    Signed and Held    Signed and Held  dextrose (GLUTOSE) oral gel 15 g  Every 15 Minutes PRN         Signed and Held    Signed and Held  dextrose (D50W) (25 g/50 mL) IV injection 25 g  Every 15 Minutes PRN         Signed and Held    Signed and Held  glucagon (GLUCAGEN) injection 1 mg  Every 15 Minutes PRN         Signed and Held    Signed and Held  POC Glucose 4x Daily Before Meals & at Bedtime  4 Times Daily Before Meals & at Bedtime      Comments: Complete no more than 45 minutes prior to patient eating      Signed and Held    Signed and Held  Insulin Lispro (humaLOG) injection 2-7 Units  4 Times Daily Before Meals & Nightly         Signed and Held    Signed and Held  High Sensitivity Troponin T  STAT         Signed and Held    Signed and Held  Initiate & Follow Heparin Protocol  Continuous         Signed and Held    Signed and Held  Notify Provider Platelet Count Less Than 00946  Continuous        Comments: Open Order Report to View Parameters Requiring Provider Notification    Signed and Held    Signed and Held  Notify Provider if PTT Not in Therapeutic Range After 24 Hours  Continuous        Comments: Open Order Report to View Parameters Requiring  Provider Notification    Signed and Held    Signed and Held  Stop Infusion & Notify Provider if Bleeding Occurs  Continuous        Comments: Open Order Report to View Parameters Requiring Provider Notification    Signed and Held    Signed and Held  Protime-INR  STAT        Comments: Prior to Initial Heparin Bolus      Signed and Held    Signed and Held  aPTT  STAT        Comments: Prior to Initial Heparin Bolus      Signed and Held    Signed and Held  aPTT  As Needed         Signed and Held    Signed and Held  RN To Release aPTT Order 6 Hours After Heparin Bolus & 6 Hours After Any Heparin Rate Change  As Needed         Signed and Held    Signed and Held  After 2 Consecutive Therapeutic aPTTs, Obtain aPTT Daily.  If a Rate Adjustment is Necessary, Resume Every 6 Hour aPTT Draws  As Needed         Signed and Held    Signed and Held  Adult Transthoracic Echo Complete W/ Cont if Necessary Per Protocol  Once         Signed and Held

## 2024-05-30 NOTE — PROGRESS NOTES
"[facility] Clinical Pharmacy Services: Vancomycin Pharmacokinetic Initial Consult Note    Charlette Rai is a 89 y.o. female who is on day 1 of pharmacy to dose vancomycin for Pneumonia.    Consult Information  Consulting Provider: Coleman  Planned Duration of Therapy: 5 days per consult  Was Patient Receiving Prior to Admission/Consult?: No  Loading Dose Ordered or Given: 1250 mg on  at 0921  PK/PD Target: Dose by Levels  Relevant ID History: robust hx of Pseudomonas aeruginosa from respiratory cultures   Other Antimicrobials: Cefepime and Tobramycin    Imaging Reviewed?: Yes   CXR: new right basilar infiltrates concerning for pna; remainder of cxr is largely stable from prior    Microbiology Data  MRSA PCR performed: 24; Result: Pending  Culture/Source:    MRSA PCR: ordered   Blood cx : in process    Vitals/Labs  Ht: 165.1 cm (65\"); Wt: 68.7 kg (151 lb 7.3 oz)  Temp (24hrs), Av.5 °F (36.4 °C), Min:97.3 °F (36.3 °C), Max:97.7 °F (36.5 °C)   Estimated Creatinine Clearance: 18.9 mL/min (A) (by C-G formula based on SCr of 2.04 mg/dL (H)).  Hemodialysis, schedule to be determined     Results from last 7 days   Lab Units 24  0559   CREATININE mg/dL 2.04*   WBC 10*3/mm3 10.27     Assessment/Plan:    Vancomycin Dose: pulse dosing with 1250 mg IV once on  at 0921 ; given patient is getting hemodialysis, will order level for tomorrow morning to determine further dosing.   Vanc Random ordered for  at 0600  Patient has order for Basic Metabolic Panel    Pharmacy will follow patient's kidney function and will adjust doses and obtain levels as necessary. Thank you for involving pharmacy in this patient's care. Please contact pharmacy with any questions or concerns.                           Pavithra Gimenez [facility]  Clinical Pharmacist  "

## 2024-05-30 NOTE — NURSING NOTE
Duration of Treatment 3.0 Hours   Access Site Tunneled Dialysis Catheter   Dialyzer Revaclear    mL/min   Dialysate Temperature (C) 36.5   Use Crit-Line? Yes   BFR-As tolerated to a maximum of: 400 mL/min   Prime Dialyzer With NS to Priming Volume? Yes   Dialysate Solution Bath: K+ 3 mEq, Ca 2.5mEq   Bicarb 33 mEq   Na+ 137 meq   Fluid Removal: 3kg as tolerated.     Patient completed entire treatment with no issues. Post /64. Removed 3L of fluid.     Report given to primary RNConstanza.

## 2024-05-31 ENCOUNTER — APPOINTMENT (OUTPATIENT)
Dept: CT IMAGING | Facility: HOSPITAL | Age: 89
End: 2024-05-31
Payer: MEDICARE

## 2024-05-31 ENCOUNTER — APPOINTMENT (OUTPATIENT)
Dept: GENERAL RADIOLOGY | Facility: HOSPITAL | Age: 89
End: 2024-05-31
Payer: MEDICARE

## 2024-05-31 PROBLEM — R79.89 ELEVATED TROPONIN LEVEL NOT DUE MYOCARDIAL INFARCTION: Status: ACTIVE | Noted: 2024-05-31

## 2024-05-31 LAB
ANION GAP SERPL CALCULATED.3IONS-SCNC: 13.1 MMOL/L (ref 5–15)
APTT PPP: 109.9 SECONDS (ref 78–95.9)
APTT PPP: 29.7 SECONDS (ref 78–95.9)
APTT PPP: 57.1 SECONDS (ref 78–95.9)
BASOPHILS # BLD AUTO: 0.01 10*3/MM3 (ref 0–0.2)
BASOPHILS NFR BLD AUTO: 0.1 % (ref 0–1.5)
BUN SERPL-MCNC: 30 MG/DL (ref 8–23)
BUN/CREAT SERPL: 12.3 (ref 7–25)
CALCIUM SPEC-SCNC: 8.9 MG/DL (ref 8.6–10.5)
CHLORIDE SERPL-SCNC: 95 MMOL/L (ref 98–107)
CO2 SERPL-SCNC: 24.9 MMOL/L (ref 22–29)
CREAT SERPL-MCNC: 2.44 MG/DL (ref 0.57–1)
DEPRECATED RDW RBC AUTO: 55.7 FL (ref 37–54)
EGFRCR SERPLBLD CKD-EPI 2021: 18.7 ML/MIN/1.73
EOSINOPHIL # BLD AUTO: 0 10*3/MM3 (ref 0–0.4)
EOSINOPHIL NFR BLD AUTO: 0 % (ref 0.3–6.2)
ERYTHROCYTE [DISTWIDTH] IN BLOOD BY AUTOMATED COUNT: 14.6 % (ref 12.3–15.4)
GEN 5 2HR TROPONIN T REFLEX: 153 NG/L
GLUCOSE BLDC GLUCOMTR-MCNC: 215 MG/DL (ref 70–99)
GLUCOSE BLDC GLUCOMTR-MCNC: 226 MG/DL (ref 70–99)
GLUCOSE BLDC GLUCOMTR-MCNC: 255 MG/DL (ref 70–99)
GLUCOSE BLDC GLUCOMTR-MCNC: 275 MG/DL (ref 70–99)
GLUCOSE SERPL-MCNC: 276 MG/DL (ref 65–99)
HCT VFR BLD AUTO: 33 % (ref 34–46.6)
HGB BLD-MCNC: 9.9 G/DL (ref 12–15.9)
IMM GRANULOCYTES # BLD AUTO: 0.03 10*3/MM3 (ref 0–0.05)
IMM GRANULOCYTES NFR BLD AUTO: 0.4 % (ref 0–0.5)
LYMPHOCYTES # BLD AUTO: 0.34 10*3/MM3 (ref 0.7–3.1)
LYMPHOCYTES NFR BLD AUTO: 4.5 % (ref 19.6–45.3)
MCH RBC QN AUTO: 30.7 PG (ref 26.6–33)
MCHC RBC AUTO-ENTMCNC: 30 G/DL (ref 31.5–35.7)
MCV RBC AUTO: 102.5 FL (ref 79–97)
MONOCYTES # BLD AUTO: 0.58 10*3/MM3 (ref 0.1–0.9)
MONOCYTES NFR BLD AUTO: 7.7 % (ref 5–12)
MRSA DNA SPEC QL NAA+PROBE: NORMAL
NEUTROPHILS NFR BLD AUTO: 6.56 10*3/MM3 (ref 1.7–7)
NEUTROPHILS NFR BLD AUTO: 87.3 % (ref 42.7–76)
NRBC BLD AUTO-RTO: 0 /100 WBC (ref 0–0.2)
PLATELET # BLD AUTO: 133 10*3/MM3 (ref 140–450)
PMV BLD AUTO: 9.9 FL (ref 6–12)
POTASSIUM SERPL-SCNC: 5.4 MMOL/L (ref 3.5–5.2)
RBC # BLD AUTO: 3.22 10*6/MM3 (ref 3.77–5.28)
SODIUM SERPL-SCNC: 133 MMOL/L (ref 136–145)
TROPONIN T DELTA: 2 NG/L
TROPONIN T SERPL HS-MCNC: 151 NG/L
VANCOMYCIN SERPL-MCNC: 10.6 MCG/ML (ref 5–40)
WBC NRBC COR # BLD AUTO: 7.52 10*3/MM3 (ref 3.4–10.8)

## 2024-05-31 PROCEDURE — 25010000002 HYALURONIDASE (HUMAN) 150 UNIT/ML SOLUTION 1 ML VIAL: Performed by: INTERNAL MEDICINE

## 2024-05-31 PROCEDURE — 94761 N-INVAS EAR/PLS OXIMETRY MLT: CPT

## 2024-05-31 PROCEDURE — 94799 UNLISTED PULMONARY SVC/PX: CPT

## 2024-05-31 PROCEDURE — 80202 ASSAY OF VANCOMYCIN: CPT | Performed by: INTERNAL MEDICINE

## 2024-05-31 PROCEDURE — 25010000002 HEPARIN (PORCINE) 25000-0.45 UT/250ML-% SOLUTION: Performed by: INTERNAL MEDICINE

## 2024-05-31 PROCEDURE — 94660 CPAP INITIATION&MGMT: CPT

## 2024-05-31 PROCEDURE — 25010000002 CEFEPIME PER 500 MG: Performed by: INTERNAL MEDICINE

## 2024-05-31 PROCEDURE — 80048 BASIC METABOLIC PNL TOTAL CA: CPT | Performed by: INTERNAL MEDICINE

## 2024-05-31 PROCEDURE — 87641 MR-STAPH DNA AMP PROBE: CPT | Performed by: INTERNAL MEDICINE

## 2024-05-31 PROCEDURE — 63710000001 INSULIN LISPRO (HUMAN) PER 5 UNITS: Performed by: INTERNAL MEDICINE

## 2024-05-31 PROCEDURE — 99233 SBSQ HOSP IP/OBS HIGH 50: CPT | Performed by: INTERNAL MEDICINE

## 2024-05-31 PROCEDURE — 84484 ASSAY OF TROPONIN QUANT: CPT | Performed by: INTERNAL MEDICINE

## 2024-05-31 PROCEDURE — 94664 DEMO&/EVAL PT USE INHALER: CPT

## 2024-05-31 PROCEDURE — 99222 1ST HOSP IP/OBS MODERATE 55: CPT | Performed by: SURGERY

## 2024-05-31 PROCEDURE — 85730 THROMBOPLASTIN TIME PARTIAL: CPT | Performed by: INTERNAL MEDICINE

## 2024-05-31 PROCEDURE — 63710000001 INSULIN GLARGINE PER 5 UNITS: Performed by: INTERNAL MEDICINE

## 2024-05-31 PROCEDURE — 74174 CTA ABD&PLVS W/CONTRAST: CPT

## 2024-05-31 PROCEDURE — 25510000001 IOPAMIDOL PER 1 ML: Performed by: INTERNAL MEDICINE

## 2024-05-31 PROCEDURE — 82948 REAGENT STRIP/BLOOD GLUCOSE: CPT

## 2024-05-31 PROCEDURE — 85025 COMPLETE CBC W/AUTO DIFF WBC: CPT | Performed by: INTERNAL MEDICINE

## 2024-05-31 PROCEDURE — 99222 1ST HOSP IP/OBS MODERATE 55: CPT | Performed by: INTERNAL MEDICINE

## 2024-05-31 PROCEDURE — 71045 X-RAY EXAM CHEST 1 VIEW: CPT

## 2024-05-31 PROCEDURE — 25010000002 METHYLPREDNISOLONE PER 125 MG: Performed by: INTERNAL MEDICINE

## 2024-05-31 RX ORDER — ISOSORBIDE DINITRATE 20 MG/1
40 TABLET ORAL
Status: DISCONTINUED | OUTPATIENT
Start: 2024-05-31 | End: 2024-06-04 | Stop reason: HOSPADM

## 2024-05-31 RX ORDER — ONDANSETRON 2 MG/ML
4 INJECTION INTRAMUSCULAR; INTRAVENOUS EVERY 6 HOURS PRN
Status: DISCONTINUED | OUTPATIENT
Start: 2024-05-31 | End: 2024-06-04 | Stop reason: HOSPADM

## 2024-05-31 RX ADMIN — Medication 10 ML: at 09:07

## 2024-05-31 RX ADMIN — ARFORMOTEROL TARTRATE 15 MCG: 15 SOLUTION RESPIRATORY (INHALATION) at 06:45

## 2024-05-31 RX ADMIN — METHYLPREDNISOLONE SODIUM SUCCINATE 60 MG: 125 INJECTION INTRAMUSCULAR; INTRAVENOUS at 05:24

## 2024-05-31 RX ADMIN — ROSUVASTATIN 20 MG: 20 TABLET, FILM COATED ORAL at 09:06

## 2024-05-31 RX ADMIN — SEVELAMER CARBONATE 800 MG: 800 TABLET, FILM COATED ORAL at 17:32

## 2024-05-31 RX ADMIN — METHYLPREDNISOLONE SODIUM SUCCINATE 60 MG: 125 INJECTION INTRAMUSCULAR; INTRAVENOUS at 12:41

## 2024-05-31 RX ADMIN — HYALURONIDASE (HUMAN RECOMBINANT) 150 UNITS: 150 INJECTION, SOLUTION SUBCUTANEOUS at 16:14

## 2024-05-31 RX ADMIN — SEVELAMER CARBONATE 800 MG: 800 TABLET, FILM COATED ORAL at 11:50

## 2024-05-31 RX ADMIN — ARFORMOTEROL TARTRATE 15 MCG: 15 SOLUTION RESPIRATORY (INHALATION) at 19:12

## 2024-05-31 RX ADMIN — CEFEPIME 2000 MG: 2 INJECTION, POWDER, FOR SOLUTION INTRAVENOUS at 10:19

## 2024-05-31 RX ADMIN — IPRATROPIUM BROMIDE AND ALBUTEROL SULFATE 3 ML: .5; 3 SOLUTION RESPIRATORY (INHALATION) at 19:12

## 2024-05-31 RX ADMIN — INSULIN LISPRO 3 UNITS: 100 INJECTION, SOLUTION INTRAVENOUS; SUBCUTANEOUS at 07:53

## 2024-05-31 RX ADMIN — BUDESONIDE 0.5 MG: 0.5 SUSPENSION RESPIRATORY (INHALATION) at 19:12

## 2024-05-31 RX ADMIN — CARVEDILOL 12.5 MG: 12.5 TABLET, FILM COATED ORAL at 09:06

## 2024-05-31 RX ADMIN — IPRATROPIUM BROMIDE AND ALBUTEROL SULFATE 3 ML: .5; 3 SOLUTION RESPIRATORY (INHALATION) at 06:45

## 2024-05-31 RX ADMIN — LOSARTAN POTASSIUM 50 MG: 50 TABLET, FILM COATED ORAL at 12:41

## 2024-05-31 RX ADMIN — BUDESONIDE 0.5 MG: 0.5 SUSPENSION RESPIRATORY (INHALATION) at 06:45

## 2024-05-31 RX ADMIN — TOBRAMYCIN 300 MG: 300 SOLUTION RESPIRATORY (INHALATION) at 11:09

## 2024-05-31 RX ADMIN — ISOSORBIDE DINITRATE 40 MG: 20 TABLET ORAL at 17:32

## 2024-05-31 RX ADMIN — INSULIN LISPRO 4 UNITS: 100 INJECTION, SOLUTION INTRAVENOUS; SUBCUTANEOUS at 20:57

## 2024-05-31 RX ADMIN — IPRATROPIUM BROMIDE AND ALBUTEROL SULFATE 3 ML: .5; 3 SOLUTION RESPIRATORY (INHALATION) at 16:49

## 2024-05-31 RX ADMIN — METHYLPREDNISOLONE SODIUM SUCCINATE 60 MG: 125 INJECTION INTRAMUSCULAR; INTRAVENOUS at 20:35

## 2024-05-31 RX ADMIN — HEPARIN SODIUM 18 UNITS/KG/HR: 10000 INJECTION, SOLUTION INTRAVENOUS at 09:22

## 2024-05-31 RX ADMIN — SEVELAMER CARBONATE 800 MG: 800 TABLET, FILM COATED ORAL at 09:06

## 2024-05-31 RX ADMIN — INSULIN LISPRO 3 UNITS: 100 INJECTION, SOLUTION INTRAVENOUS; SUBCUTANEOUS at 11:49

## 2024-05-31 RX ADMIN — IPRATROPIUM BROMIDE AND ALBUTEROL SULFATE 3 ML: .5; 3 SOLUTION RESPIRATORY (INHALATION) at 11:39

## 2024-05-31 RX ADMIN — ASPIRIN 81 MG: 81 TABLET, COATED ORAL at 09:19

## 2024-05-31 RX ADMIN — Medication 10 ML: at 20:35

## 2024-05-31 RX ADMIN — TOBRAMYCIN 300 MG: 300 SOLUTION RESPIRATORY (INHALATION) at 19:12

## 2024-05-31 RX ADMIN — IPRATROPIUM BROMIDE AND ALBUTEROL SULFATE 3 ML: .5; 3 SOLUTION RESPIRATORY (INHALATION) at 03:52

## 2024-05-31 RX ADMIN — ACETAMINOPHEN 650 MG: 325 TABLET ORAL at 20:50

## 2024-05-31 RX ADMIN — IOPAMIDOL 100 ML: 755 INJECTION, SOLUTION INTRAVENOUS at 15:28

## 2024-05-31 RX ADMIN — IPRATROPIUM BROMIDE AND ALBUTEROL SULFATE 3 ML: .5; 3 SOLUTION RESPIRATORY (INHALATION) at 22:38

## 2024-05-31 RX ADMIN — PANTOPRAZOLE SODIUM 40 MG: 40 TABLET, DELAYED RELEASE ORAL at 16:45

## 2024-05-31 RX ADMIN — ACETAMINOPHEN 650 MG: 325 TABLET ORAL at 03:05

## 2024-05-31 RX ADMIN — PANTOPRAZOLE SODIUM 40 MG: 40 TABLET, DELAYED RELEASE ORAL at 07:56

## 2024-05-31 RX ADMIN — ISOSORBIDE DINITRATE 40 MG: 20 TABLET ORAL at 12:41

## 2024-05-31 RX ADMIN — INSULIN LISPRO 4 UNITS: 100 INJECTION, SOLUTION INTRAVENOUS; SUBCUTANEOUS at 17:31

## 2024-05-31 RX ADMIN — INSULIN GLARGINE 10 UNITS: 100 INJECTION, SOLUTION SUBCUTANEOUS at 17:31

## 2024-05-31 RX ADMIN — SODIUM ZIRCONIUM CYCLOSILICATE 10 G: 10 POWDER, FOR SUSPENSION ORAL at 09:06

## 2024-05-31 RX ADMIN — CARVEDILOL 12.5 MG: 12.5 TABLET, FILM COATED ORAL at 17:33

## 2024-05-31 NOTE — PROGRESS NOTES
.rtRT EQUIPMENT DEVICE RELATED - SKIN ASSESSMENT    RT Medical Equipment/Device:     NIV Mask:  Full-face    size: .    Skin Assessment:      Cheek:  Intact  Chin:  Intact  Nares:  Intact  Nose:  Intact    Device Skin Pressure Protection:  Pressure points protected    Nurse Notification:  Gladis Lancaster, RRTRT EQUIPMENT DEVICE RELATED - SKIN ASSESSMENT

## 2024-05-31 NOTE — CONSULTS
[facility]   VASCULAR SURGERY CONSULT    Patient Name: Charlette Rai  : 1935  MRN: [id number]  Primary Care Physician:  Rafael Lyons MD  Date of admission: 2024    Subjective   Subjective     Chief Complaint: Severe hypertension with pulmonary edema.  Concern for renal stenosis.    HPI:    Charlette Rai is a 89 y.o. female who has been admitted to the hospital multiple times because of severe hypertension pulmonary edema.  She also has had underlying pulmonary infiltrates and concern for pneumonia.  There is concern that she has significant renal hypertension causing her repeated admissions.  She is on 4 medications for hypertension.  She has had a prior study sometime ago which showed renal artery stenosis.  Also known to have mesenteric stenosis.  The patient tells me she had stents put in her abdomen about 12 years ago although we do not have any records here.  She has been on dialysis for a while.  She had a fistula in the left arm that caused steal and need to be ligated.  Now currently she has a graft in place.  Currently being dialyzed through a catheter.    The patient is a diabetic.  She states she is well-controlled.  Monitors her diet.  Compliant with medications.  She is not a smoker currently.    Review of Systems    Shortness of breath.  She says this is improved since yesterday.  Cough.  No chest pain.  No weight loss.  Otherwise negative.      Personal History     Past Medical History:   Diagnosis Date    Allergic rhinitis     Anaphylactic shock, unspecified, initial encounter 2023    Anemia     Arthritis     Asthma     USE INHALERS AND NEBULIZERS    Back pain     Bladder disorder     Cancer     LEFT LUNG CANCER  SURGERY AND CHEMO DONE  AND CURRENTLY   RIGHT LUNG CANCER HAS ONLY RECEIVED RADIATION THUS FAR    Chronic kidney disease     stage 3    CKD (chronic kidney disease) requiring chronic dialysis     CKD (chronic kidney disease) stage 4, GFR 15-29 ml/min      Condition not found     ulcer    Congestive heart failure (CHF)     FOLLOWED BY DR AQUINO. DENIES CP BUT DOES HAVE SOA CHRONIC ISSUE COPD/LUNG CANCER    COPD (chronic obstructive pulmonary disease)     FOLLOWED BY DR MARIAJOSE BAILEY    Coronary artery disease     DENIES CP BUT DOES GET SOA MOST OF THE TIME WITH EXERTION BUT OCC AT REST CHRONIC ISSUE COPD/LUNG CANCER    Deep vein thrombosis     Diabetes mellitus     DOES NOT CHECK BS DAILY    Disease of thyroid gland     HYPOTHYROIDISM    Essential hypertension     Gastric ulcer     GERD (gastroesophageal reflux disease)     Heart murmur     History of transfusion     NO ISSUES POST TRANSFUSION WAS MANY YEARS AGO    Hyperlipemia     Leukocytopenia     FOLLOWED BY DR MIR BAILEY    Limb swelling     Lumbago     Lumbar spinal stenosis     Lung cancer     Migraine headache     Multiple joint pain     On home oxygen therapy     3L/NC PRN    Osteopenia     PNA (pneumonia) 05/04/2024    Pseudomonas pneumonia 04/29/2024    Reflux esophagitis     Shortness of breath     Thyroid nodule     HAS ULTRASOUND YEARLY BEING MONITORED    Vascular disease     Vitamin D deficiency        Past Surgical History:   Procedure Laterality Date    ABDOMINAL SURGERY      APPENDECTOMY      ARTERIOVENOUS FISTULA/SHUNT SURGERY Left 05/10/2022    Procedure: Left basilic vein transposition;  Surgeon: Wan Barksdale MD;  Location: [facility] MAIN OR;  Service: Vascular;  Laterality: Left;    ARTERIOVENOUS FISTULA/SHUNT SURGERY Left 3/23/2023    Procedure: Ligation of left arm arteriovenous fistula;  Surgeon: Wan Barksdale MD;  Location: [facility] MAIN OR;  Service: Vascular;  Laterality: Left;    ARTERIOVENOUS FISTULA/SHUNT SURGERY Left 11/30/2023    Procedure: Creation of left arm arteriovenous graft;  Surgeon: Wan Barksdale MD;  Location: [facility] MAIN OR;  Service: Vascular;  Laterality: Left;    BRONCHOSCOPY N/A 4/24/2024    Procedure: BRONCHOSCOPY WITH BAL AND WASHINGS;  Surgeon: Khalif Yanez MD;   Location: [facility] ENDOSCOPY;  Service: Pulmonary;  Laterality: N/A;  MUCUS PLUGGING    CARDIAC CATHETERIZATION  1996    CARDIAC SURGERY      CARDIAC SURGERY      fluid drained from heart    CATARACT EXTRACTION, BILATERAL  2003    COLONOSCOPY  2014    ENDOSCOPY  2016    2019    FEMORAL ARTERY STENT Bilateral     HYSTERECTOMY      LUNG BIOPSY Left 2005    lobectomy upper lung caner    LUNG VOLUME REDUCTION      OTHER SURGICAL HISTORY      artifical joints/limbs    REPLACEMENT TOTAL KNEE Left 2016    UPPER GASTROINTESTINAL ENDOSCOPY         Family History: family history includes Arthritis in her father and mother; Cancer in her brother, brother, brother, and brother; Diabetes in her brother; Other in her brother; Prostate cancer in her brother and brother. Otherwise pertinent FHx was reviewed and not pertinent to current issue.    Social History:  reports that she quit smoking about 19 years ago. Her smoking use included cigarettes. She started smoking about 72 years ago. She has a 52.5 pack-year smoking history. She has been exposed to tobacco smoke. She has never used smokeless tobacco. She reports that she does not drink alcohol and does not use drugs.    Home Medications:  acetaminophen, albuterol sulfate HFA, arformoterol, aspirin, budesonide, carvedilol, dexlansoprazole, furosemide, ipratropium-albuterol, isosorbide dinitrate, levocetirizine, linagliptin, losartan, nitroglycerin, revefenacin, rosuvastatin, saccharomyces boulardii, sevelamer, and tobramycin PF    Allergies:  No Known Allergies    Objective   Objective     Vitals:   Temp:  [97.3 °F (36.3 °C)-98 °F (36.7 °C)] 97.9 °F (36.6 °C)  Heart Rate:  [75-91] 87  Resp:  [15-24] 18  BP: (126-151)/(47-67) 141/53  Flow (L/min):  [2] 2    Physical Exam   Patient is awake and alert.  Mild dyspnea.  Appears stated age.  Uncomfortable but no distress.  Rhythm is regular.  Abdomen is soft.  2+ right femoral and 3+ left femoral.  Pedal pulses are not palpable.  Edema  present on the feet.    Diagnostic studies: Previous CT showed severe left renal stenosis.  Right renal subcapsular hematoma.  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    Labs:      Results from last 7 days   Lab Units 05/31/24  0620 05/30/24  0559   WBC 10*3/mm3 7.52 10.27   HEMOGLOBIN g/dL 9.9* 10.7*   HEMATOCRIT % 33.0* 36.6   PLATELETS 10*3/mm3 133* 176         Results from last 7 days   Lab Units 05/31/24  0620 05/30/24  0559   CREATININE mg/dL 2.44* 2.04*         Results from last 7 days   Lab Units 05/30/24  1229   INR  1.02              Lab Results   Component Value Date    LDL 36 07/26/2023              Assessment & Plan   Assessment / Plan     Active Hospital Problems:  Active Hospital Problems    Diagnosis     **Acute on chronic respiratory failure with hypoxia and hypercapnia        Assessment/plan:   89-year-old female with severe hypertension and hypertensive emergency with pulmonary edema requiring multiple admissions.  Will get a CTA to evaluate her renal arteries.  With a right subcapsular hematoma this may be challenging.  Will see if we need to intervene on renal arteries to decrease her renal hypertension.  I did a long discussion with the patient's nephrology team who were concerned about this.  Usually in dialysis patients renal intervention is not fruitful but if she continues to have significant pulmonary edema and no obvious other sources present, renal stent placement may decrease her renin output.  Will continue to follow.        Electronically signed by Dio Miguel MD, 05/31/24, 9:23 AM EDT.

## 2024-05-31 NOTE — PLAN OF CARE
Goal Outcome Evaluation:                 Patient remained on continuous Bipap of 14/7 and 28% overnight, but did come off to eat dinner and visit with family before going back on.  Patient did really well while off.

## 2024-05-31 NOTE — PROGRESS NOTES
Respiratory Therapist Broncho-Pulmonary Hygiene Progress Note      Patient Name:  Charlette Rai  YOB: 1935    Charlette Rai meets the qualification for Level 1 of the Bronco-Pulmonary Hygiene Protocol. This was based on my daily patient assessment and includes review of chest x-ray results, cough ability and quality, oxygenation, secretions or risk for secretion development and patient mobility.     Broncho-Pulmonary Hygiene Assessment:    Level of Movement: Actively changing positions without assistance  Alert/ oriented/ cooperative    Breath Sounds: Clear to slightly diminished    Cough: Strong, effective    Chest X-Ray: Possible signs of consolidation and/or atelectasis or clear.     Sputum Productions: None or small amount of thin or watery secretions with effective cough    History and Physical: Chronic condition    SpO2 to Oxygen Need: greater than 92% on room air or  less than 3L nasal canula    Current SpO2 is: 98 on 2l    Based on this information, I have completed the following interventions: Teach/Instruct patient on cough and deep breathe      Electronically signed by Maxine Morton RRT, 05/31/24, 12:43 PM EDT.

## 2024-05-31 NOTE — PLAN OF CARE
Goal Outcome Evaluation:        Problem: Adult Inpatient Plan of Care  Goal: Plan of Care Review  Outcome: Ongoing, Progressing         Pt doing okay throughout shift; wore bipap most of the night; now maintaining oxygen sat on 2L NC; on heparin gtt; continue to monitor.

## 2024-05-31 NOTE — PLAN OF CARE
Problem: Adult Inpatient Plan of Care  Goal: Plan of Care Review  Outcome: Ongoing, Progressing  Flowsheets (Taken 5/31/2024 1948)  Progress: improving  Plan of Care Reviewed With: patient  Outcome Evaluation: Patient maintained O2 level above 90% on 2L/NC, decreased edema to BLE.  Patient denies pain.   Goal Outcome Evaluation:  Plan of Care Reviewed With: patient        Progress: improving  Outcome Evaluation: Patient maintained O2 level above 90% on 2L/NC, decreased edema to BLE.  Patient denies pain.

## 2024-05-31 NOTE — PROGRESS NOTES
[facility]     Nephrology Progress Note      Patient Name: Charlette Rai  : 1935  MRN: [id number]  Primary Care Physician:  Rafael Lyons MD  Date of admission: 2024    Subjective   Subjective     Interval History:  Patient Reports feeling a lot better.  On O2 per nasal cannula this morning.  Less short of breath, still with intermittent cough.  Sitting in bed eating breakfast.    Review of Systems   All systems were reviewed and negative except for: What is mentioned above.    Objective   Objective     Vitals:   Temp:  [97.3 °F (36.3 °C)-98 °F (36.7 °C)] 97.9 °F (36.6 °C)  Heart Rate:  [75-94] 85  Resp:  [15-24] 18  BP: (126-152)/(47-67) 143/49  Flow (L/min):  [2] 2  Physical Exam:   Constitutional: Awake, alert, no distress, mild conversational dyspnea   Eyes: sclerae anicteric, no conjunctival injection   HENT: mucous membranes moist   Neck: Supple, no thyromegaly, no lymphadenopathy, trachea midline, + JVD   Respiratory: Decreased to auscultation bilaterally with scattered rhonchi, nonlabored respirations    Cardiovascular: RRR, no murmurs, rubs, or gallops.   Gastrointestinal: Positive bowel sounds, soft, nontender, nondistended   Musculoskeletal: No edema, no clubbing or cyanosis, left upper extremity AV graft patent, right IJ TDC in place.   Psychiatric: Appropriate affect, cooperative   Neurologic: Oriented x 3, moving all extremities, Cranial Nerves grossly intact, speech clear   Skin: warm and dry, no rashes     Result Review    Result Reviewed:  I have personally reviewed the results from the time of this admission to 2024 14:19 EDT and agree with these findings:  [x]  Laboratory  []  Microbiology  [x]  Radiology  []  EKG/Telemetry   []  Cardiology/Vascular   []  Pathology  []  Old records  []  Other:        Lab 24  0620 24  0729 24  0559   SODIUM 133*  --  136   SODIUM, ARTERIAL  --  134.9*  --    POTASSIUM 5.4*  --  4.3   CHLORIDE 95*  --  96*   CO2 24.9  --   35.2*   BUN 30*  --  23   CREATININE 2.44*  --  2.04*   GLUCOSE 276*  --  268*   GLUCOSE, ARTERIAL  --  213*  --    EGFR 18.7*  --  23.2*   ANION GAP 13.1  --  4.8*           Most notable findings include: Reviewed labs.  Potassium 5.4.    Assessment & Plan   Assessment / Plan       Active Hospital Problems:  Active Hospital Problems    Diagnosis     **Acute on chronic respiratory failure with hypoxia and hypercapnia     Elevated troponin level not due myocardial infarction     Aortic stenosis, moderate     Acute on chronic heart failure with preserved ejection fraction (HFpEF)        Assessment and Plan:    - ESRD, dialysis MWF through right IJ TDC, [place], [place].  Here with acute respiratory failure I suspect she has flash pulmonary edema might be precipitated by bilateral renal artery stenosis seen previously on CT angiogram.  Discussed with vascular.  I appreciate their help.  Status post dialysis yesterday.  Plan dialysis tomorrow.    Patient would rather not use her AV graft until she is feeling better.  Will use TDC for dialysis tomorrow  - Severe hypertension, acute on chronic, she is in respiratory distress, improving.  Continue current blood pressure medications.    - Possible pneumonia right lower quadrant, on antibiotics, per primary.  - Anemia of chronic kidney disease, hemoglobin at goal.  - Severe peripheral vascular disease.  - Diastolic dysfunction with LVH, EF 55%.    - Type 2 diabetes with complications.  - Mild hyperkalemia, will give Lokelma x1 and add low potassium to her diet.   Will follow.      Electronically signed by Edi García MD, 5/31/2024, 14:19 EDT.

## 2024-05-31 NOTE — PROGRESS NOTES
[facility]   Hospitalist Progress Note  Date: 2024  Patient Name: Charlette Rai  : 1935  MRN: [id number]  Date of admission: 2024  Consultants:   -Cardiology: Dr. León Sanchez  -Vascular surgery: Dr. Dio Miguel  -Pulmonology: Dr. Khalif Yanez  -Nephrology: Dr. Edi García    Subjective   Subjective     Chief Complaint: Shortness of breath    Summary:   Charlette Rai is a 89 y.o. female with left lung cancer s/p resection and chemo, right lung cancer s/p radiation, COPD, CAD, ESRD on hemodialysis, essential hypertension who had recent admission to the hospital for Pseudomonas pneumonia and had discharged to rehab and subsequently discharged home from rehab for only a couple of days before she experienced a severe shortness of breath prompting her to come to ED for evaluation.  Patient significantly hypertensive and hypoxic.  Patient placed on BiPAP.  CXR showed right lung opacities.  Patient started on broad-spectrum empiric antibiotics.  Troponin was noted to be elevated higher than previous values and proBNP elevated as well, patient started on heparin drip.  Pulmonology, cardiology, vascular surgery and nephrology consulted.    Interval Followup:   No acute events overnight.  Patient states she is feeling little bit better today than she was yesterday.  Continues on heparin drip this morning.  No reports of chest pain.  Supplemental O2 requirement being weaned.  Nursing with no additional acute issues to report.    Antibiotics:   -Cefepime    Objective   Objective     Vitals:   Temp:  [97.3 °F (36.3 °C)-98 °F (36.7 °C)] 97.9 °F (36.6 °C)  Heart Rate:  [75-94] 85  Resp:  [15-24] 18  BP: (128-152)/(47-64) 143/49  Flow (L/min):  [2] 2  Physical Exam   Gen: No acute distress, chronically ill-appearing elderly female, sitting up in bed, conversant, Pleasant  Resp: Poor aeration, diminished breath sounds bilaterally, bibasilar crackles, equal chest rise bilaterally  Card: RRR, No m/r/g  Abd: Soft,  Nontender, Nondistended, + bowel sounds    Result Review    Result Review:  I have personally reviewed the results as below and agree with these findings:  []  Laboratory:   CMP          5/3/2024    13:51 5/30/2024    05:59 5/30/2024    07:29 5/31/2024    06:20   CMP   Glucose 141  268  213  276    BUN 23  23   30    Creatinine 3.21  2.04   2.44    EGFR 13.5  23.2   18.7    Sodium 134  136  134.9  133    Potassium 4.3  4.3   5.4    Chloride 100  96   95    Calcium 8.1  8.6   8.9    Total Protein  6.4      Albumin  3.7      Globulin  2.7      Total Bilirubin  0.2      Alkaline Phosphatase  191      AST (SGOT)  24      ALT (SGPT)  16      Albumin/Globulin Ratio  1.4      BUN/Creatinine Ratio 7.2  11.3   12.3    Anion Gap 9.0  4.8   13.1      CBC          5/3/2024    13:51 5/30/2024    05:59 5/31/2024    06:20   CBC   WBC 7.14  10.27  7.52    RBC 2.92  3.57  3.22    Hemoglobin 8.9  10.7  9.9    Hematocrit 29.8  36.6  33.0    .1  102.5  102.5    MCH 30.5  30.0  30.7    MCHC 29.9  29.2  30.0    RDW 17.0  14.5  14.6    Platelets 127  176  133    Hyperglycemia noted.  PTT: 57.1.  [x]  Microbiology: Blood culture (05/30/2024): No growth to date.  [x]  Radiology: CXR imaging personally reviewed.  Infiltrates noted in right lung.  No evidence of pneumothorax.  [x]  EKG/Telemetry:    []  Cardiology/Vascular:    []  Pathology:  []  Old records:  []  Other:    Assessment & Plan   Assessment / Plan     Assessment:  Acute COPD exacerbation  Recurrent right lower lobe pneumonia  Recent history of Pseudomonas pneumonia  Hypertensive urgency  Volume overload  Acute hypoxic respiratory failure secondary to volume overload, right lower lobe pneumonia, COPD exacerbation  ESRD on hemodialysis  Type II NSTEMI  Type 2 diabetes mellitus with hyperglycemia    Plan:  -Cardiology, Vascular Surgery, Pulmonology and Nephrology consulted and following, appreciate assistance and recommendations in the care of this patient.  -Discontinue  heparin drip  -Continue supplemental O2 to maintain sats greater than 90%, wean as tolerated  -Continue Brovana, Pulmicort and DuoNebs  -Continue JUAN nebs  -Continue cefepime.  -Discontinue vancomycin  -Hemodialysis per nephrology.  -Continue IV Solu-Medrol 60 mg every 8 hours  -Patient with history of peripheral vascular disease.  There is concern that patient may have significant renal hypertension causing her repeated admissions.  Vascular surgery consulted.  CTA will be obtained to evaluate renal arteries.  -Isosorbide dinitrate dose increased per cardiology  -Continue appropriate home medications  -Start Lantus.  Continue SSI.  Monitor blood glucose level and SSI requirement titrate insulin regimen accordingly.  -PT/OT consulted  -Will monitor electrolytes and renal function with BMP and magnesium level in the AM  -Will monitor WBC and Hgb with CBC in the AM  -Clinical course will dictate further management     DVT Prophylaxis: SCDs  GI Prophylaxis: Pantoprazole  Diet:   Diet Order   Procedures    Diet: Regular/House, Renal, Fluid Restriction (240 mL/tray); Low Sodium (2-3g), Low Potassium; 1500 mL/day; Fluid Consistency: Thin (IDDSI 0)     Dispo: PT/OT consult     Time spent personally reviewing patient's chart, labs and imaging, evaluating/examining the patient, discussing care plan with patient and nurse at bedside and discussing management with consultants (Cardiology, Vascular Surgery, Pulmonology and Nephrology): 52 minutes.     Part of this note may be an electronic transcription/translation of spoken language to printed text using the Dragon dictation system.    DVT prophylaxis:  Medical DVT prophylaxis orders are present.        CODE STATUS:   Code Status (Patient has no pulse and is not breathing): CPR (Attempt to Resuscitate)  Medical Interventions (Patient has pulse or is breathing): Full Support  Comments: DNI        Electronically signed by Pieter Salgado MD, 5/31/2024, 16:24 EDT.

## 2024-05-31 NOTE — PLAN OF CARE
Goal Outcome Evaluation:  Plan of Care Reviewed With: patient           Outcome Evaluation: patient wore bipap 14/7, 28% for few hours last night. She is on 2l this morning doing fine, bipap on standby.

## 2024-05-31 NOTE — PROGRESS NOTES
Pulmonary / Critical Care Progress Note      Patient Name: Charlette Rai  : 1935  MRN: [id number]  Primary Care Physician:  Rafael Lyons MD  Date of admission: 2024    Subjective   Subjective   Follow-up for volume overload and pneumonia    Over past 24 hours: Started on vancomycin and cefepime, continues on Solu-Medrol and nebulizers, underwent urgent dialysis.    No acute events overnight.  Wore NIPPV.    This morning,   Sitting up in bed  Eating breakfast  Currently on 2 L nasal cannula  Overall feeling better  Dyspnea improved  Underwent dialysis yesterday with 3 L removed      Objective   Objective     Vitals:   Temp:  [97.3 °F (36.3 °C)-98 °F (36.7 °C)] 97.9 °F (36.6 °C)  Heart Rate:  [] 87  Resp:  [15-27] 18  BP: (121-161)/(41-67) 141/53  Flow (L/min):  [2] 2    Physical Exam   Vital Signs Reviewed   General: Frail appearing, elderly female, alert, NAD, sitting up in bed on 2 L NC  Chest: Poor aeration, diminished breath sounds bilaterally with improving bibasilar crackles, no work of breathing noted  CV: RRR, no MGR, pulses 2+, equal.  Abd:  Soft, NT, ND, + BS, no HSM  EXT:  no clubbing, no cyanosis, no edema  Neuro:  A&Ox3, CN grossly intact, no focal deficits.  Skin: No rashes or lesions noted      Result Review    Result Review:  I have personally reviewed the results from the time of this admission to 2024 07:09 EDT and agree with these findings:  [x]  Laboratory  [x]  Microbiology  [x]  Radiology  [x]  EKG/Telemetry   []  Cardiology/Vascular   []  Pathology  []  Old records  []  Other:  Most notable findings include:         Lab 24  0620 24  0729 24  0559   WBC 7.52  --  10.27   HEMOGLOBIN 9.9*  --  10.7*   HEMATOCRIT 33.0*  --  36.6   PLATELETS 133*  --  176   SODIUM 133*  --  136   SODIUM, ARTERIAL  --  134.9*  --    POTASSIUM 5.4*  --  4.3   CHLORIDE 95*  --  96*   CO2 24.9  --  35.2*   BUN 30*  --  23   CREATININE 2.44*  --  2.04*   GLUCOSE 276*  --  268*    GLUCOSE, ARTERIAL  --  213*  --    CALCIUM 8.9  --  8.6   TOTAL PROTEIN  --   --  6.4   ALBUMIN  --   --  3.7   GLOBULIN  --   --  2.7     XR Chest 1 View    Result Date: 5/31/2024  AP PORTABLE CHEST  HISTORY: Hypoxia and dyspnea.  COMPARISON: 5/30/2024  TECHNIQUE: AP portable chest x-ray.  FINDINGS: Dialysis catheter in good position. Infiltrates at the right lung base have improved. Interstitial changes in the right lung overall are improved as well. The left hemithorax is stable, as is some fibrotic change in the right midlung. No pneumothorax.      Impression: Improved infiltrates in the right lung.  Electronically Signed By-Dr. Weston Castillo MD On:5/31/2024 5:48 AM      XR Chest 1 View    Result Date: 5/30/2024  AP PORTABLE CHEST  HISTORY: Shortness of air. History of left lung cancer.  COMPARISON: 5/1/2024  TECHNIQUE: AP portable chest x-ray.  FINDINGS: Dialysis catheter tip remains in the lower SVC. There is extensive atherosclerotic calcification in the aorta. Postoperative changes are noted in the left lung. Pleural plaques and pleural thickening in the left hemithorax are similar to prior. Areas of chronic consolidation/scarring in the left lung also appears stable. 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 There are new infiltrates at the right lung base that likely reflect pneumonia. No pneumothorax. Background COPD is present.      Impression: New right basilar infiltrates concerning for pneumonia. The remainder of the chest x-ray is largely stable from prior.  Electronically Signed By-Dr. Weston Castillo MD On:5/30/2024 6:08 AM       Assessment & Plan   Assessment / Plan     Active Hospital Problems:  Active Hospital Problems    Diagnosis     **Acute on chronic respiratory failure with hypoxia and hypercapnia      Impression:   Recurrent pneumonia, right lower lobe pneumonia  Recent history of Pseudomonas pneumonia  Possible hypertensive urgency  Volume overload  End-stage renal disease on  dialysis  COPD with acute exacerbation  History of lung cancer status post surgery and radiation in past    Plan:   -Continue O2 to maintain SpO2 greater than 90%  -Continue NIPPV at night and as needed days with naps  -AM CXR with improved infiltrates likely secondary to pulmonary edema  -Nephrology on board.  Appreciate assistance.  -Underwent emergent dialysis with 3 L removed yesterday.  May need additional dialysis today.  Dialysis timing per nephrology.  -MRSA PCR negative.  Vancomycin discontinued.  -Continue cefepime at this time for possible pneumonia, will discontinue tomorrow if cultures remain negative  -Continue Solu-Medrol 60 mg IV every 8 hours  -Continue Brovana, Pulmicort and DuoNebs  -Continue JUAN nebs  -Start bronchopulmonary hygiene.  -Echocardiogram with EF of 55%, grade 1 diastolic dysfunction, moderate aortic valve stenosis with moderately elevated RVSP  -Vascular surgery consulted for renal artery stenosis.  Appreciate assistance.  -Encourage mobilization.  Out of bed to chair.      DVT prophylaxis:  Medical DVT prophylaxis orders are present.        CODE STATUS:   Code Status (Patient has no pulse and is not breathing): CPR (Attempt to Resuscitate)  Medical Interventions (Patient has pulse or is breathing): Full Support  Comments: DNI      I personally reviewed pertinent labs, imaging and provider notes. Discussed with bedside nurse and will discuss with primary service.       Electronically signed by Khalif Yanez MD, 5/31/2024, 07:09 EDT.    Electronically signed by DAT Carballo, 05/31/24, 11:01 AM EDT.      This visit was performed by BOTH a physician and an APC. I personally evaluated and examined the patient. I performed all aspects of MDM as documented. , I have reviewed and confirmed the accuracy of the patient's history as documented in this note., and I have reexamined the patient and the results are consistent with the previously documented exam. I have updated the  documentation as necessary.     Electronically signed by Khalif Yanez MD, 05/31/24, 2:56 PM EDT.

## 2024-05-31 NOTE — PROGRESS NOTES
RT EQUIPMENT DEVICE RELATED - SKIN ASSESSMENT    RT Medical Equipment/Device:     NIV Mask:  Full-face    size: s    Skin Assessment:      Cheek:  Intact  Chin:  Intact  Nose:  Intact  Mouth:  Intact    Device Skin Pressure Protection:  Positioning supports utilized    Nurse Notification:  Gladis Morton, RRT

## 2024-05-31 NOTE — CONSULTS
Cardiology Consult Note  [facility] 4TH FLOOR MEDICAL TELEMETRY UNIT          Patient Identification:  Charlette Rai      [id number]  89 y.o.        female  1935           Reason for Consultation: CHF and elevated troponin    PCP: Rafael Lyons MD    History of Present Illness:     Patient is a 89-year-old with a history of left lung cancer status post chemo/surgical resection, end-stage renal disease on hemodialysis diabetes type 2, essential hypertension, HFpEF, aortic stenosis who was recently treated for Pseudomonas infection and congestive heart failure had spent a couple weeks in rehab and discharged home in the course of her 2 days became more short of breath with some lower extremity edema.  She had been going to dialysis sessions and not missed any visits.  She denies any chest discomfort issues she has had some lower back pain problems.  When she was admitted to the emergency room she was found to be hypoxic sats down in the 80s with a chest x-ray concerning for right upper and lower lobe infiltrates as well as a markedly elevated BNP level of 18,000.  In addition her troponins were elevated and peaked at 160 she has chronically elevated troponins in the past before.  She has not noted to have any acute EKG changes  Past History:  Past Medical History:   Diagnosis Date    Allergic rhinitis     Anaphylactic shock, unspecified, initial encounter 12/05/2023    Anemia     Arthritis     Asthma     USE INHALERS AND NEBULIZERS    Back pain     Bladder disorder     Cancer     LEFT LUNG CANCER 2005 SURGERY AND CHEMO DONE  AND CURRENTLY 4/ 14/22  RIGHT LUNG CANCER HAS ONLY RECEIVED RADIATION THUS FAR    Chronic kidney disease     stage 3    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 DENIES CP BUT DOES HAVE SOA CHRONIC ISSUE COPD/LUNG  CANCER    COPD (chronic obstructive pulmonary disease)     FOLLOWED BY DR MARIAJOSE BAILEY    Coronary artery disease     DENIES CP BUT DOES GET SOA MOST OF THE TIME WITH EXERTION BUT OCC AT REST CHRONIC ISSUE COPD/LUNG CANCER    Deep vein thrombosis     Diabetes mellitus     DOES NOT CHECK BS DAILY    Disease of thyroid gland     HYPOTHYROIDISM    Essential hypertension     Gastric ulcer     GERD (gastroesophageal reflux disease)     Heart murmur     History of transfusion     NO ISSUES POST TRANSFUSION WAS MANY YEARS AGO    Hyperlipemia     Leukocytopenia     FOLLOWED BY DR MIR BAILEY    Limb swelling     Lumbago     Lumbar spinal stenosis     Lung cancer     Migraine headache     Multiple joint pain     On home oxygen therapy     3L/NC PRN    Osteopenia     PNA (pneumonia) 05/04/2024    Pseudomonas pneumonia 04/29/2024    Reflux esophagitis     Shortness of breath     Thyroid nodule     HAS ULTRASOUND YEARLY BEING MONITORED    Vascular disease     Vitamin D deficiency      Past Surgical History:   Procedure Laterality Date    ABDOMINAL SURGERY      APPENDECTOMY      ARTERIOVENOUS FISTULA/SHUNT SURGERY Left 05/10/2022    Procedure: Left basilic vein transposition;  Surgeon: Wan Barksdale MD;  Location: [facility] MAIN OR;  Service: Vascular;  Laterality: Left;    ARTERIOVENOUS FISTULA/SHUNT SURGERY Left 3/23/2023    Procedure: Ligation of left arm arteriovenous fistula;  Surgeon: Wan Barksdale MD;  Location: [facility] MAIN OR;  Service: Vascular;  Laterality: Left;    ARTERIOVENOUS FISTULA/SHUNT SURGERY Left 11/30/2023    Procedure: Creation of left arm arteriovenous graft;  Surgeon: Wan Barksdale MD;  Location: [facility] MAIN OR;  Service: Vascular;  Laterality: Left;    BRONCHOSCOPY N/A 4/24/2024    Procedure: BRONCHOSCOPY WITH BAL AND WASHINGS;  Surgeon: Khalif Yanez MD;  Location: [facility] ENDOSCOPY;  Service: Pulmonary;  Laterality: N/A;  MUCUS PLUGGING    CARDIAC CATHETERIZATION  1996    CARDIAC SURGERY      CARDIAC  SURGERY      fluid drained from heart    CATARACT EXTRACTION, BILATERAL      COLONOSCOPY  2014    ENDOSCOPY      2019    FEMORAL ARTERY STENT Bilateral     HYSTERECTOMY      LUNG BIOPSY Left 2005    lobectomy upper lung caner    LUNG VOLUME REDUCTION      OTHER SURGICAL HISTORY      artifical joints/limbs    REPLACEMENT TOTAL KNEE Left 2016    UPPER GASTROINTESTINAL ENDOSCOPY       No Known Allergies  Social History     Socioeconomic History    Marital status:    Tobacco Use    Smoking status: Former     Current packs/day: 0.00     Average packs/day: 1 pack/day for 52.5 years (52.5 ttl pk-yrs)     Types: Cigarettes     Start date:      Quit date: 2004     Years since quittin.9     Passive exposure: Past    Smokeless tobacco: Never   Vaping Use    Vaping status: Never Used   Substance and Sexual Activity    Alcohol use: Never    Drug use: Never    Sexual activity: Defer     Family History   Problem Relation Age of Onset    Arthritis Mother     Arthritis Father     Cancer Brother     Diabetes Brother     Other Brother         blood disease     Prostate cancer Brother     Cancer Brother     Prostate cancer Brother     Cancer Brother     Cancer Brother     Malig Hyperthermia Neg Hx     Colon cancer Neg Hx        Medications:  Prior to Admission medications    Medication Sig Start Date End Date Taking? Authorizing Provider   acetaminophen (TYLENOL) 325 MG tablet Take 2 tablets by mouth Every 6 (Six) Hours As Needed for Mild Pain.   Yes Sarah Parker MD   albuterol sulfate  (90 Base) MCG/ACT inhaler Inhale 2 puffs Every 4 (Four) Hours As Needed for Wheezing.   Yes Sarah Parker MD   arformoterol (BROVANA) 15 MCG/2ML nebulizer solution Take 2 mL by nebulization 2 (Two) Times a Day for 30 days. 24 Yes Rafael Lyons MD   aspirin 81 MG EC tablet Take 1 tablet by mouth Daily.   Yes Sarah Parker MD   budesonide (PULMICORT) 0.5 MG/2ML nebulizer solution Take  2 mL by nebulization Daily.   Yes Sarah Parker MD   carvedilol (COREG) 12.5 MG tablet Take 1 tablet by mouth 2 (Two) Times a Day With Meals for 30 days. 5/7/24 6/6/24 Yes Rafael Lyons MD   dexlansoprazole (DEXILANT) 60 MG capsule Take 1 capsule by mouth Daily. 9/23/22  Yes Sarah Parker MD   furosemide (LASIX) 20 MG tablet Take 1 tablet by mouth Daily.   Yes Sarah Parker MD   ipratropium-albuterol (DUO-NEB) 0.5-2.5 mg/3 ml nebulizer Take 3 mL by nebulization Every 4 (Four) Hours As Needed for Shortness of Air or Wheezing for up to 30 days.  Patient taking differently: Take 3 mL by nebulization Every 4 (Four) Hours As Needed for Shortness of Air or Wheezing. End date 6/8/24 4/29/24 5/30/24 Yes Rafael Lyons MD   isosorbide dinitrate (ISORDIL) 20 MG tablet Take 1 tablet by mouth 3 (Three) Times a Day for 30 days.  Patient taking differently: Take 1 tablet by mouth 3 (Three) Times a Day. End date 6/8/24 4/29/24 5/30/24 Yes Rafael Lyons MD   linagliptin (Tradjenta) 5 MG tablet tablet Take 1 tablet by mouth Daily. 1/10/22  Yes Paloma James MD   losartan (Cozaar) 25 MG tablet Take 2 tablets by mouth Daily. 5/1/24  Yes Sarah Parker MD   rosuvastatin (CRESTOR) 20 MG tablet Take 1 tablet by mouth Daily. 7/7/23  Yes León Sanchez MD   saccharomyces boulardii (FLORASTOR) 250 MG capsule Take 1 capsule by mouth 2 (Two) Times a Day.   Yes Sarah Parker MD   sevelamer (RENVELA) 800 MG tablet Take 1 tablet by mouth 3 (Three) Times a Day With Meals. 10/9/23  Yes Sarah Parker MD   tobramycin PF (Moy) 300 MG/5ML nebulizer solution Take 5 mL by nebulization 2 (Two) Times a Day for 30 days. Cycle 30 days on, then 30 days off. 5/7/24 6/6/24 Yes Lorraine Ross APRN   levocetirizine (XYZAL) 5 MG tablet TAKE 1 TABLET DAILY  Patient taking differently: Take 1 tablet by mouth Every Evening. 6/23/23   Paloma James MD   nitroglycerin (NITROSTAT) 0.4 MG SL  "tablet Place 1 tablet under the tongue Every 5 (Five) Minutes As Needed for Chest Pain.    Provider, MD Sarah   revefenacin (YUPELRI) 175 MCG/3ML nebulizer solution Take 3 mL by nebulization Daily for 30 days. 5/8/24 6/7/24  Rafael Lyons MD      Current medications:  arformoterol, 15 mcg, Nebulization, BID - RT  aspirin, 81 mg, Oral, Daily  budesonide, 0.5 mg, Nebulization, BID - RT  carvedilol, 12.5 mg, Oral, BID With Meals  cefepime, 2,000 mg, Intravenous, Q24H  insulin lispro, 2-7 Units, Subcutaneous, 4x Daily AC & at Bedtime  ipratropium-albuterol, 3 mL, Nebulization, Q4H  isosorbide dinitrate, 20 mg, Oral, TID - Nitrates  losartan, 50 mg, Oral, Q24H  methylPREDNISolone sodium succinate, 60 mg, Intravenous, Q8H  pantoprazole, 40 mg, Oral, BID AC  rosuvastatin, 20 mg, Oral, Daily  sevelamer, 800 mg, Oral, TID With Meals  sodium chloride, 10 mL, Intravenous, Q12H  tobramycin PF, 300 mg, Nebulization, BID - RT      Current IV drips:  heparin, 12 Units/kg/hr, Last Rate: 18 Units/kg/hr (05/31/24 0922)        Review of Systems   Constitutional: Negative for chills, fever and weight loss.   HENT:  Negative for congestion and nosebleeds.    Cardiovascular:  Positive for leg swelling. Negative for orthopnea and paroxysmal nocturnal dyspnea.   Respiratory:  Positive for shortness of breath. Negative for cough.    Endocrine: Negative for cold intolerance and heat intolerance.   Skin:  Negative for rash.   Musculoskeletal:  Negative for back pain and muscle weakness.   Gastrointestinal:  Negative for abdominal pain, nausea and vomiting.   Genitourinary:  Negative for dysuria and nocturia.   Neurological:  Negative for dizziness and light-headedness.   Psychiatric/Behavioral:  Negative for altered mental status and hallucinations.          Physical Exam    /57 (BP Location: Right arm, Patient Position: Lying)   Pulse 87   Temp 97.3 °F (36.3 °C) (Oral)   Resp 18   Ht 165.1 cm (65\")   Wt 69 kg (152 lb 1.9 oz)  "  LMP  (LMP Unknown)   SpO2 100% Comment: from in room pulse ox  BMI 25.31 kg/m²  Body mass index is 25.31 kg/m².   Oxygen saturation   @FLOWAN(10::1)@ SpO2  Min: 96 %  Max: 100 %    General Appearance:   no acute distress  Alert and oriented x3  HENT:   lips not cyanotic  Atraumatic  Neck:  thyroid not enlarged  supple  Respiratory:  no respiratory distress  normal breath sounds  no rales  Cardiovascular:  no jugular venous distention  regular rhythm  apical impulse normal  S1 normal, S2 normal  no S3, no S4   2/6 systolic murmur  no rub, no thrill  no carotid bruit  pedal pulses normal  lower extremity edema: Mild  Gastrointestinal:   bowel sounds normal  non-tender  no hepatomegaly, no splenomegaly  Musculoskeletal:  no clubbing of fingers.   normocephalic, head atraumatic  Skin:   warm, dry  No rashes  Neuro/Psychiatric:  normal mood and affect  judgement and insight appropriate      Cardiographics:     ECG  (personally reviewed)    Telemetry:  (personally reviewed) normal sinus rhythm with some sinus tachycardia occasional PVCs 1 brief nonsustained V. tach episode   Results for orders placed during the hospital encounter of 05/30/24    Adult Transthoracic Echo Complete W/ Cont if Necessary Per Protocol    Interpretation Summary    Left ventricular systolic function is normal. Estimated left ventricular EF = 55%    Left ventricular wall thickness is consistent with mild to moderate concentric hypertrophy. Sigmoid-shaped ventricular septum is present.    Left ventricular diastolic function is consistent with (grade Ia w/high LAP) impaired relaxation.    The left atrial cavity is mildly dilated.    Moderate aortic valve stenosis is present.    Moderate mitral valve stenosis is present.    Estimated right ventricular systolic pressure from tricuspid regurgitation is moderately elevated (45-55 mmHg).         No results found for this or any previous visit.     Cardiolite (Tc-99m Sestamibi) stress test               "    Lab Review:       CBC          5/3/2024    13:51 5/30/2024    05:59 5/31/2024    06:20   CBC   WBC 7.14  10.27  7.52    RBC 2.92  3.57  3.22    Hemoglobin 8.9  10.7  9.9    Hematocrit 29.8  36.6  33.0    .1  102.5  102.5    MCH 30.5  30.0  30.7    MCHC 29.9  29.2  30.0    RDW 17.0  14.5  14.6    Platelets 127  176  133        CMP          5/3/2024    13:51 5/30/2024    05:59 5/30/2024    07:29 5/31/2024    06:20   CMP   Glucose 141  268  213  276    BUN 23  23   30    Creatinine 3.21  2.04   2.44    EGFR 13.5  23.2   18.7    Sodium 134  136  134.9  133    Potassium 4.3  4.3   5.4    Chloride 100  96   95    Calcium 8.1  8.6   8.9    Total Protein  6.4      Albumin  3.7      Globulin  2.7      Total Bilirubin  0.2      Alkaline Phosphatase  191      AST (SGOT)  24      ALT (SGPT)  16      Albumin/Globulin Ratio  1.4      BUN/Creatinine Ratio 7.2  11.3   12.3    Anion Gap 9.0  4.8   13.1         CARDIAC LABS:      Lab 05/31/24 0620 05/30/24 1229 05/30/24  0811 05/30/24  0559   PROBNP  --   --   --  18,671.0*   HSTROP T 151* 163* 108* 66*   PROTIME  --  13.6  --  13.9   INR  --  1.02  --  1.05      No results found for: \"DIGOXIN\"   Lab Results   Component Value Date    TSH 3.630 05/01/2024           Invalid input(s): \"LDLCALC\"  No results found for: \"POCTROP\"  No results found for: \"DDIMERQUAN\"  Lab Results   Component Value Date    MG 1.7 05/01/2024             CARDIAC LABS:      Lab 05/31/24 0620 05/30/24 1229 05/30/24  0811 05/30/24  0559   PROBNP  --   --   --  18,671.0*   HSTROP T 151* 163* 108* 66*   PROTIME  --  13.6  --  13.9   INR  --  1.02  --  1.05        Imaging:  CXR   Impression: Improved infiltrates in the right lung. Electronically Signed By-Dr. Weston Castillo MD On:5/31/2024 5:48 AM   Assessment:    Acute on chronic respiratory failure with hypoxia and hypercapnia    Acute on chronic heart failure with preserved ejection fraction (HFpEF)    Aortic stenosis, moderate    Elevated troponin " level not due myocardial infarction      HFpEF acute on chronic feel this is compounding her hypoxic respiratory failure issues currently undergoing hemodialysis yesterday for more volume management and removal she does report some improvement in her edema today.  Increase isosorbide dinitrate to 40 3 times daily given though her end-stage renal disease as well as the fact that this is diastolic heart failure fairly limited on medical options for treating heart failure.      Elevated troponin secondary to type II MI no chest pain or acute EKG changes patient with significant hypoxia as well as end-stage renal disease 1 does recommend aspirin 81 mg once a day and can discontinue heparin GGT    Plan:  1.  Increase isosorbide dinitrate to 40 3 times daily  2.  Aspir 81 milligrams daily  3.  Discontinue heparin GGT      Thank you for allowing us to share in [facility].            León Sanchez MD   5/31/2024    10:12 EDT

## 2024-05-31 NOTE — H&P (VIEW-ONLY)
[facility]   VASCULAR SURGERY CONSULT    Patient Name: Charlette Rai  : 1935  MRN: [id number]  Primary Care Physician:  Rafael Lyons MD  Date of admission: 2024    Subjective   Subjective     Chief Complaint: Severe hypertension with pulmonary edema.  Concern for renal stenosis.    HPI:    Charlette Rai is a 89 y.o. female who has been admitted to the hospital multiple times because of severe hypertension pulmonary edema.  She also has had underlying pulmonary infiltrates and concern for pneumonia.  There is concern that she has significant renal hypertension causing her repeated admissions.  She is on 4 medications for hypertension.  She has had a prior study sometime ago which showed renal artery stenosis.  Also known to have mesenteric stenosis.  The patient tells me she had stents put in her abdomen about 12 years ago although we do not have any records here.  She has been on dialysis for a while.  She had a fistula in the left arm that caused steal and need to be ligated.  Now currently she has a graft in place.  Currently being dialyzed through a catheter.    The patient is a diabetic.  She states she is well-controlled.  Monitors her diet.  Compliant with medications.  She is not a smoker currently.    Review of Systems    Shortness of breath.  She says this is improved since yesterday.  Cough.  No chest pain.  No weight loss.  Otherwise negative.      Personal History     Past Medical History:   Diagnosis Date    Allergic rhinitis     Anaphylactic shock, unspecified, initial encounter 2023    Anemia     Arthritis     Asthma     USE INHALERS AND NEBULIZERS    Back pain     Bladder disorder     Cancer     LEFT LUNG CANCER  SURGERY AND CHEMO DONE  AND CURRENTLY   RIGHT LUNG CANCER HAS ONLY RECEIVED RADIATION THUS FAR    Chronic kidney disease     stage 3    CKD (chronic kidney disease) requiring chronic dialysis     CKD (chronic kidney disease) stage 4, GFR 15-29 ml/min      Condition not found     ulcer    Congestive heart failure (CHF)     FOLLOWED BY DR AQUINO. DENIES CP BUT DOES HAVE SOA CHRONIC ISSUE COPD/LUNG CANCER    COPD (chronic obstructive pulmonary disease)     FOLLOWED BY DR MARIAJOSE BAILEY    Coronary artery disease     DENIES CP BUT DOES GET SOA MOST OF THE TIME WITH EXERTION BUT OCC AT REST CHRONIC ISSUE COPD/LUNG CANCER    Deep vein thrombosis     Diabetes mellitus     DOES NOT CHECK BS DAILY    Disease of thyroid gland     HYPOTHYROIDISM    Essential hypertension     Gastric ulcer     GERD (gastroesophageal reflux disease)     Heart murmur     History of transfusion     NO ISSUES POST TRANSFUSION WAS MANY YEARS AGO    Hyperlipemia     Leukocytopenia     FOLLOWED BY DR MIR BAILEY    Limb swelling     Lumbago     Lumbar spinal stenosis     Lung cancer     Migraine headache     Multiple joint pain     On home oxygen therapy     3L/NC PRN    Osteopenia     PNA (pneumonia) 05/04/2024    Pseudomonas pneumonia 04/29/2024    Reflux esophagitis     Shortness of breath     Thyroid nodule     HAS ULTRASOUND YEARLY BEING MONITORED    Vascular disease     Vitamin D deficiency        Past Surgical History:   Procedure Laterality Date    ABDOMINAL SURGERY      APPENDECTOMY      ARTERIOVENOUS FISTULA/SHUNT SURGERY Left 05/10/2022    Procedure: Left basilic vein transposition;  Surgeon: Wan Barksdale MD;  Location: [facility] MAIN OR;  Service: Vascular;  Laterality: Left;    ARTERIOVENOUS FISTULA/SHUNT SURGERY Left 3/23/2023    Procedure: Ligation of left arm arteriovenous fistula;  Surgeon: Wan Barksdale MD;  Location: [facility] MAIN OR;  Service: Vascular;  Laterality: Left;    ARTERIOVENOUS FISTULA/SHUNT SURGERY Left 11/30/2023    Procedure: Creation of left arm arteriovenous graft;  Surgeon: Wan Barksdale MD;  Location: [facility] MAIN OR;  Service: Vascular;  Laterality: Left;    BRONCHOSCOPY N/A 4/24/2024    Procedure: BRONCHOSCOPY WITH BAL AND WASHINGS;  Surgeon: Khalif Yanez MD;   Location: [facility] ENDOSCOPY;  Service: Pulmonary;  Laterality: N/A;  MUCUS PLUGGING    CARDIAC CATHETERIZATION  1996    CARDIAC SURGERY      CARDIAC SURGERY      fluid drained from heart    CATARACT EXTRACTION, BILATERAL  2003    COLONOSCOPY  2014    ENDOSCOPY  2016    2019    FEMORAL ARTERY STENT Bilateral     HYSTERECTOMY      LUNG BIOPSY Left 2005    lobectomy upper lung caner    LUNG VOLUME REDUCTION      OTHER SURGICAL HISTORY      artifical joints/limbs    REPLACEMENT TOTAL KNEE Left 2016    UPPER GASTROINTESTINAL ENDOSCOPY         Family History: family history includes Arthritis in her father and mother; Cancer in her brother, brother, brother, and brother; Diabetes in her brother; Other in her brother; Prostate cancer in her brother and brother. Otherwise pertinent FHx was reviewed and not pertinent to current issue.    Social History:  reports that she quit smoking about 19 years ago. Her smoking use included cigarettes. She started smoking about 72 years ago. She has a 52.5 pack-year smoking history. She has been exposed to tobacco smoke. She has never used smokeless tobacco. She reports that she does not drink alcohol and does not use drugs.    Home Medications:  acetaminophen, albuterol sulfate HFA, arformoterol, aspirin, budesonide, carvedilol, dexlansoprazole, furosemide, ipratropium-albuterol, isosorbide dinitrate, levocetirizine, linagliptin, losartan, nitroglycerin, revefenacin, rosuvastatin, saccharomyces boulardii, sevelamer, and tobramycin PF    Allergies:  No Known Allergies    Objective   Objective     Vitals:   Temp:  [97.3 °F (36.3 °C)-98 °F (36.7 °C)] 97.9 °F (36.6 °C)  Heart Rate:  [75-91] 87  Resp:  [15-24] 18  BP: (126-151)/(47-67) 141/53  Flow (L/min):  [2] 2    Physical Exam   Patient is awake and alert.  Mild dyspnea.  Appears stated age.  Uncomfortable but no distress.  Rhythm is regular.  Abdomen is soft.  2+ right femoral and 3+ left femoral.  Pedal pulses are not palpable.  Edema  present on the feet.    Diagnostic studies: Previous CT showed severe left renal stenosis.  Right renal subcapsular hematoma.  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    Labs:      Results from last 7 days   Lab Units 05/31/24  0620 05/30/24  0559   WBC 10*3/mm3 7.52 10.27   HEMOGLOBIN g/dL 9.9* 10.7*   HEMATOCRIT % 33.0* 36.6   PLATELETS 10*3/mm3 133* 176         Results from last 7 days   Lab Units 05/31/24  0620 05/30/24  0559   CREATININE mg/dL 2.44* 2.04*         Results from last 7 days   Lab Units 05/30/24  1229   INR  1.02              Lab Results   Component Value Date    LDL 36 07/26/2023              Assessment & Plan   Assessment / Plan     Active Hospital Problems:  Active Hospital Problems    Diagnosis     **Acute on chronic respiratory failure with hypoxia and hypercapnia        Assessment/plan:   89-year-old female with severe hypertension and hypertensive emergency with pulmonary edema requiring multiple admissions.  Will get a CTA to evaluate her renal arteries.  With a right subcapsular hematoma this may be challenging.  Will see if we need to intervene on renal arteries to decrease her renal hypertension.  I did a long discussion with the patient's nephrology team who were concerned about this.  Usually in dialysis patients renal intervention is not fruitful but if she continues to have significant pulmonary edema and no obvious other sources present, renal stent placement may decrease her renin output.  Will continue to follow.        Electronically signed by Dio Miguel MD, 05/31/24, 9:23 AM EDT.

## 2024-06-01 LAB
ANION GAP SERPL CALCULATED.3IONS-SCNC: 10.6 MMOL/L (ref 5–15)
BUN SERPL-MCNC: 54 MG/DL (ref 8–23)
BUN/CREAT SERPL: 17.9 (ref 7–25)
CALCIUM SPEC-SCNC: 8.2 MG/DL (ref 8.6–10.5)
CHLORIDE SERPL-SCNC: 94 MMOL/L (ref 98–107)
CO2 SERPL-SCNC: 25.4 MMOL/L (ref 22–29)
CREAT SERPL-MCNC: 3.02 MG/DL (ref 0.57–1)
DEPRECATED RDW RBC AUTO: 55.7 FL (ref 37–54)
EGFRCR SERPLBLD CKD-EPI 2021: 14.5 ML/MIN/1.73
ERYTHROCYTE [DISTWIDTH] IN BLOOD BY AUTOMATED COUNT: 14.8 % (ref 12.3–15.4)
GLUCOSE BLDC GLUCOMTR-MCNC: 170 MG/DL (ref 70–99)
GLUCOSE BLDC GLUCOMTR-MCNC: 206 MG/DL (ref 70–99)
GLUCOSE BLDC GLUCOMTR-MCNC: 236 MG/DL (ref 70–99)
GLUCOSE BLDC GLUCOMTR-MCNC: 256 MG/DL (ref 70–99)
GLUCOSE SERPL-MCNC: 191 MG/DL (ref 65–99)
HCT VFR BLD AUTO: 29.7 % (ref 34–46.6)
HGB BLD-MCNC: 8.8 G/DL (ref 12–15.9)
MAGNESIUM SERPL-MCNC: 2.2 MG/DL (ref 1.6–2.4)
MCH RBC QN AUTO: 29.9 PG (ref 26.6–33)
MCHC RBC AUTO-ENTMCNC: 29.6 G/DL (ref 31.5–35.7)
MCV RBC AUTO: 101 FL (ref 79–97)
PHOSPHATE SERPL-MCNC: 4.4 MG/DL (ref 2.5–4.5)
PLATELET # BLD AUTO: 143 10*3/MM3 (ref 140–450)
PMV BLD AUTO: 10 FL (ref 6–12)
POTASSIUM SERPL-SCNC: 5.3 MMOL/L (ref 3.5–5.2)
RBC # BLD AUTO: 2.94 10*6/MM3 (ref 3.77–5.28)
SODIUM SERPL-SCNC: 130 MMOL/L (ref 136–145)
WBC NRBC COR # BLD AUTO: 8.5 10*3/MM3 (ref 3.4–10.8)

## 2024-06-01 PROCEDURE — 83735 ASSAY OF MAGNESIUM: CPT | Performed by: INTERNAL MEDICINE

## 2024-06-01 PROCEDURE — 84100 ASSAY OF PHOSPHORUS: CPT | Performed by: INTERNAL MEDICINE

## 2024-06-01 PROCEDURE — 25010000002 METHYLPREDNISOLONE PER 125 MG: Performed by: INTERNAL MEDICINE

## 2024-06-01 PROCEDURE — 94799 UNLISTED PULMONARY SVC/PX: CPT

## 2024-06-01 PROCEDURE — 25010000002 HEPARIN (PORCINE) PER 1000 UNITS: Performed by: INTERNAL MEDICINE

## 2024-06-01 PROCEDURE — 85027 COMPLETE CBC AUTOMATED: CPT | Performed by: INTERNAL MEDICINE

## 2024-06-01 PROCEDURE — 80048 BASIC METABOLIC PNL TOTAL CA: CPT | Performed by: INTERNAL MEDICINE

## 2024-06-01 PROCEDURE — 94761 N-INVAS EAR/PLS OXIMETRY MLT: CPT

## 2024-06-01 PROCEDURE — 99233 SBSQ HOSP IP/OBS HIGH 50: CPT | Performed by: INTERNAL MEDICINE

## 2024-06-01 PROCEDURE — 63710000001 INSULIN LISPRO (HUMAN) PER 5 UNITS: Performed by: INTERNAL MEDICINE

## 2024-06-01 PROCEDURE — 82948 REAGENT STRIP/BLOOD GLUCOSE: CPT

## 2024-06-01 PROCEDURE — 99233 SBSQ HOSP IP/OBS HIGH 50: CPT | Performed by: STUDENT IN AN ORGANIZED HEALTH CARE EDUCATION/TRAINING PROGRAM

## 2024-06-01 PROCEDURE — 63710000001 INSULIN GLARGINE PER 5 UNITS: Performed by: INTERNAL MEDICINE

## 2024-06-01 PROCEDURE — 94664 DEMO&/EVAL PT USE INHALER: CPT

## 2024-06-01 PROCEDURE — 99232 SBSQ HOSP IP/OBS MODERATE 35: CPT | Performed by: INTERNAL MEDICINE

## 2024-06-01 PROCEDURE — 99232 SBSQ HOSP IP/OBS MODERATE 35: CPT | Performed by: SURGERY

## 2024-06-01 RX ADMIN — INSULIN LISPRO 3 UNITS: 100 INJECTION, SOLUTION INTRAVENOUS; SUBCUTANEOUS at 21:32

## 2024-06-01 RX ADMIN — ISOSORBIDE DINITRATE 40 MG: 20 TABLET ORAL at 17:31

## 2024-06-01 RX ADMIN — Medication 10 ML: at 21:32

## 2024-06-01 RX ADMIN — Medication 10 ML: at 13:44

## 2024-06-01 RX ADMIN — INSULIN LISPRO 4 UNITS: 100 INJECTION, SOLUTION INTRAVENOUS; SUBCUTANEOUS at 17:31

## 2024-06-01 RX ADMIN — HEPARIN SODIUM 3400 UNITS: 1000 INJECTION INTRAVENOUS; SUBCUTANEOUS at 12:40

## 2024-06-01 RX ADMIN — SEVELAMER CARBONATE 800 MG: 800 TABLET, FILM COATED ORAL at 18:27

## 2024-06-01 RX ADMIN — INSULIN GLARGINE 10 UNITS: 100 INJECTION, SOLUTION SUBCUTANEOUS at 08:48

## 2024-06-01 RX ADMIN — ACETAMINOPHEN 650 MG: 325 TABLET ORAL at 09:33

## 2024-06-01 RX ADMIN — METHYLPREDNISOLONE SODIUM SUCCINATE 60 MG: 125 INJECTION INTRAMUSCULAR; INTRAVENOUS at 13:43

## 2024-06-01 RX ADMIN — ROSUVASTATIN 20 MG: 20 TABLET, FILM COATED ORAL at 13:44

## 2024-06-01 RX ADMIN — TOBRAMYCIN 300 MG: 300 SOLUTION RESPIRATORY (INHALATION) at 06:49

## 2024-06-01 RX ADMIN — BUDESONIDE 0.5 MG: 0.5 SUSPENSION RESPIRATORY (INHALATION) at 06:49

## 2024-06-01 RX ADMIN — LOSARTAN POTASSIUM 50 MG: 50 TABLET, FILM COATED ORAL at 13:44

## 2024-06-01 RX ADMIN — IPRATROPIUM BROMIDE AND ALBUTEROL SULFATE 3 ML: .5; 3 SOLUTION RESPIRATORY (INHALATION) at 15:17

## 2024-06-01 RX ADMIN — CARVEDILOL 12.5 MG: 12.5 TABLET, FILM COATED ORAL at 17:31

## 2024-06-01 RX ADMIN — ARFORMOTEROL TARTRATE 15 MCG: 15 SOLUTION RESPIRATORY (INHALATION) at 19:52

## 2024-06-01 RX ADMIN — METHYLPREDNISOLONE SODIUM SUCCINATE 60 MG: 125 INJECTION INTRAMUSCULAR; INTRAVENOUS at 05:13

## 2024-06-01 RX ADMIN — PANTOPRAZOLE SODIUM 40 MG: 40 TABLET, DELAYED RELEASE ORAL at 17:32

## 2024-06-01 RX ADMIN — IPRATROPIUM BROMIDE AND ALBUTEROL SULFATE 3 ML: .5; 3 SOLUTION RESPIRATORY (INHALATION) at 19:52

## 2024-06-01 RX ADMIN — IPRATROPIUM BROMIDE AND ALBUTEROL SULFATE 3 ML: .5; 3 SOLUTION RESPIRATORY (INHALATION) at 03:15

## 2024-06-01 RX ADMIN — ARFORMOTEROL TARTRATE 15 MCG: 15 SOLUTION RESPIRATORY (INHALATION) at 06:49

## 2024-06-01 RX ADMIN — PANTOPRAZOLE SODIUM 40 MG: 40 TABLET, DELAYED RELEASE ORAL at 07:46

## 2024-06-01 RX ADMIN — BUDESONIDE 0.5 MG: 0.5 SUSPENSION RESPIRATORY (INHALATION) at 19:52

## 2024-06-01 RX ADMIN — IPRATROPIUM BROMIDE AND ALBUTEROL SULFATE 3 ML: .5; 3 SOLUTION RESPIRATORY (INHALATION) at 06:49

## 2024-06-01 RX ADMIN — ASPIRIN 81 MG: 81 TABLET, COATED ORAL at 13:43

## 2024-06-01 RX ADMIN — TOBRAMYCIN 300 MG: 300 SOLUTION RESPIRATORY (INHALATION) at 19:51

## 2024-06-01 RX ADMIN — ISOSORBIDE DINITRATE 40 MG: 20 TABLET ORAL at 13:44

## 2024-06-01 RX ADMIN — SEVELAMER CARBONATE 800 MG: 800 TABLET, FILM COATED ORAL at 13:53

## 2024-06-01 RX ADMIN — INSULIN LISPRO 2 UNITS: 100 INJECTION, SOLUTION INTRAVENOUS; SUBCUTANEOUS at 13:41

## 2024-06-01 NOTE — PLAN OF CARE
Goal Outcome Evaluation:  Plan of Care Reviewed With: patient        Progress: no change  Outcome Evaluation: Patient wore BiPAP for about 30 min and then refused to be placed back on it. She is currently resting on 2L [place].

## 2024-06-01 NOTE — PROGRESS NOTES
RT EQUIPMENT DEVICE RELATED - SKIN ASSESSMENT    RT Medical Equipment/Device:     NIV Mask:  Full-face    size:      Skin Assessment:      Cheek:  Intact  Neck:  Intact  Nose:  Intact  Mouth:  Intact    Device Skin Pressure Protection:  Skin-to-device areas padded:  None Required    Nurse Notification:  Gladis Chandler, RRT

## 2024-06-01 NOTE — PROGRESS NOTES
[facility]   Hospitalist Progress Note  Date: 2024  Patient Name: Charlette Rai  : 1935  MRN: [id number]  Date of admission: 2024  Consultants:   -Cardiology: Dr. León Sanchez, Dr. Justus Wills  -Vascular surgery: Dr. Dio Miguel  -Pulmonology: Dr. Khalif Yanez, Dr. Rosalinda Bocanegra  -Nephrology: Dr. Edi García    Subjective   Subjective     Chief Complaint: Shortness of breath    Summary:   Charlette Rai is a 89 y.o. female with left lung cancer s/p resection and chemo, right lung cancer s/p radiation, COPD, CAD, ESRD on hemodialysis, essential hypertension who had recent admission to the hospital for Pseudomonas pneumonia and had discharged to rehab and subsequently discharged home from rehab for only a couple of days before she experienced a severe shortness of breath prompting her to come to ED for evaluation.  Patient significantly hypertensive and hypoxic.  Patient placed on BiPAP.  CXR showed right lung opacities.  Patient started on broad-spectrum empiric antibiotics.  Troponin was noted to be elevated higher than previous values and proBNP elevated as well, patient started on heparin drip.  Pulmonology, cardiology, vascular surgery and nephrology consulted.  Echocardiogram obtained, normal EF and no wall motion abnormalities noted, moderate LVH and moderate valvular disease.    Interval Followup:   No acute issues overnight.  Noted her shortness of breath is improving.  Denies any active chest pain.  Patient seen while on dialysis.  Nursing with no additional acute issues to report.    Objective   Objective     Vitals:   Temp:  [97.3 °F (36.3 °C)-97.9 °F (36.6 °C)] 97.9 °F (36.6 °C)  Heart Rate:  [77-96] 86  Resp:  [16-20] 20  BP: (106-167)/(43-86) 167/71  Flow (L/min):  [1-2] 2  Physical Exam   Gen: No acute distress, seen while on dialysis, conversant, pleasant  Resp: Improved aeration, minimal crackles, equal chest rise bilaterally  Card: RRR, No m/r/g  Abd: Soft, Nontender, Nondistended,  + bowel sounds    Result Review    Result Review:  I have personally reviewed the results as below and agree with these findings:  []  Laboratory:   CMP          5/30/2024    05:59 5/30/2024    07:29 5/31/2024    06:20 6/1/2024    05:05   CMP   Glucose 268  213  276  191    BUN 23   30  54    Creatinine 2.04   2.44  3.02    EGFR 23.2   18.7  14.5    Sodium 136  134.9  133  130    Potassium 4.3   5.4  5.3    Chloride 96   95  94    Calcium 8.6   8.9  8.2    Total Protein 6.4       Albumin 3.7       Globulin 2.7       Total Bilirubin 0.2       Alkaline Phosphatase 191       AST (SGOT) 24       ALT (SGPT) 16       Albumin/Globulin Ratio 1.4       BUN/Creatinine Ratio 11.3   12.3  17.9    Anion Gap 4.8   13.1  10.6      CBC          5/30/2024    05:59 5/31/2024    06:20 6/1/2024    05:05   CBC   WBC 10.27  7.52  8.50    RBC 3.57  3.22  2.94    Hemoglobin 10.7  9.9  8.8    Hematocrit 36.6  33.0  29.7    .5  102.5  101.0    MCH 30.0  30.7  29.9    MCHC 29.2  30.0  29.6    RDW 14.5  14.6  14.8    Platelets 176  133  143    Magnesium and phosphorus within normal limits  [x]  Microbiology: Blood culture (05/30/2024): No growth to date.  [x]  Radiology: CT angiogram abdomen pelvis imaging personally reviewed.  High-grade arterial stenoses involving origins of the celiac trunk, SMA and left renal artery.  [x]  EKG/Telemetry:    []  Cardiology/Vascular:    []  Pathology:  []  Old records:  []  Other:    Assessment & Plan   Assessment / Plan     Assessment:  Acute COPD exacerbation  Recurrent right lower lobe pneumonia  Recent history of Pseudomonas pneumonia  High-grade arterial stenosis involving origin of the celiac trunk, SMA and left renal artery  Hypertensive urgency  Volume overload  Acute hypoxic respiratory failure secondary to volume overload, right lower lobe pneumonia, COPD exacerbation  ESRD on hemodialysis  Type II NSTEMI  Type 2 diabetes mellitus with hyperglycemia    Plan:  -Cardiology, Vascular Surgery,  Pulmonology and Nephrology consulted and following, appreciate assistance and recommendations in the care of this patient.  -Continue supplemental O2 to maintain sats greater than 90%, wean as tolerated  -Continue Brovana, Pulmicort and DuoNebs  -Continue JUAN nebs  -Discontinue cefepime.  Cultures remained negative.  -Hemodialysis per nephrology  -Continue IV Solu-Medrol  -Continue carvedilol, isosorbide dinitrate, losartan  -No change to insulin regimen  -Continue appropriate home medications  -Management discussed with vascular surgery.  Patient does have high-grade left renal stenosis and severe SMA stenosis.  Patient continues to have issues with hypertension will consider placement of left renal stent.  -PT/OT consulted  -Monitor electrolytes and renal function with BMP and magnesium level in the AM  -Monitor WBC and Hgb with CBC in the AM  -Clinical course will dictate further management     DVT Prophylaxis: SCDs  GI Prophylaxis: Pantoprazole  Diet:   Diet Order   Procedures    Diet: Regular/House, Renal, Fluid Restriction (240 mL/tray); Low Sodium (2-3g), Low Potassium; 1500 mL/day; Fluid Consistency: Thin (IDDSI 0)     Dispo: PT/OT consulted     Time spent personally reviewing patient's chart, labs and imaging, evaluating/examining the patient, discussing care plan with patient and nurse at bedside and discussing management with consultants (Cardiology, Vascular Surgery, Pulmonology and Nephrology): 51 minutes.     Part of this note may be an electronic transcription/translation of spoken language to printed text using the Dragon dictation system.    DVT prophylaxis:  Medical and mechanical DVT prophylaxis orders are present.        CODE STATUS:   Code Status (Patient has no pulse and is not breathing): CPR (Attempt to Resuscitate)  Medical Interventions (Patient has pulse or is breathing): Full Support  Comments: DNI        Electronically signed by Pieter Salgado MD, 6/1/2024, 13:57 EDT.

## 2024-06-01 NOTE — PLAN OF CARE
Problem: Adult Inpatient Plan of Care  Goal: Plan of Care Review  Outcome: Ongoing, Progressing  Flowsheets (Taken 6/1/2024 0338 by Ela Chandler, RRT)  Progress: no change  Plan of Care Reviewed With: patient  Goal: Patient-Specific Goal (Individualized)  Outcome: Ongoing, Progressing  Goal: Absence of Hospital-Acquired Illness or Injury  Outcome: Ongoing, Progressing  Intervention: Identify and Manage Fall Risk  Recent Flowsheet Documentation  Taken 6/1/2024 0500 by Dorothy Buenrostro RN  Safety Promotion/Fall Prevention: safety round/check completed  Taken 6/1/2024 0300 by Dorothy Buenrostro RN  Safety Promotion/Fall Prevention: safety round/check completed  Taken 6/1/2024 0100 by Dorothy Buenrostro RN  Safety Promotion/Fall Prevention: safety round/check completed  Taken 5/31/2024 2300 by Dorothy Buenrostro RN  Safety Promotion/Fall Prevention: safety round/check completed  Taken 5/31/2024 2100 by Dorothy Buenrostro RN  Safety Promotion/Fall Prevention: safety round/check completed  Taken 5/31/2024 2050 by Dorothy Buenrostro RN  Safety Promotion/Fall Prevention: safety round/check completed  Taken 5/31/2024 1910 by Dorothy Buenrostro RN  Safety Promotion/Fall Prevention: safety round/check completed  Intervention: Prevent Skin Injury  Recent Flowsheet Documentation  Taken 5/31/2024 2300 by Dorothy Buenrostro RN  Body Position:   turned   left  Taken 5/31/2024 2050 by Dorothy Buenrostro RN  Body Position:   turned   right  Taken 5/31/2024 1910 by Dorothy Buenrostro RN  Body Position: weight shifting  Intervention: Prevent and Manage VTE (Venous Thromboembolism) Risk  Recent Flowsheet Documentation  Taken 6/1/2024 0500 by Dorothy Buenrostro RN  Activity Management: (pt sleeping) other (see comments)  Taken 6/1/2024 0300 by Dorothy Buenrostro RN  Activity Management: (pt sleeping) other (see comments)  Taken 6/1/2024 0100 by Dorothy Buenrostro RN  Activity Management: (pt sleeping) other (see  comments)  Taken 5/31/2024 2300 by Dorothy Buenrostro RN  Activity Management: (patient sleeping) other (see comments)  Taken 5/31/2024 2100 by Dorothy Buenrostro RN  Activity Management: activity encouraged  Taken 5/31/2024 2050 by Dorothy Buenrostro RN  Activity Management: activity encouraged  VTE Prevention/Management: patient refused intervention  Intervention: Prevent Infection  Recent Flowsheet Documentation  Taken 6/1/2024 0500 by Dorothy Buenrostro RN  Infection Prevention: rest/sleep promoted  Taken 6/1/2024 0300 by Dorothy Buenrostro RN  Infection Prevention: rest/sleep promoted  Taken 6/1/2024 0100 by Dorothy Buenrostro RN  Infection Prevention: rest/sleep promoted  Taken 5/31/2024 2300 by Dorothy Buenrostro RN  Infection Prevention: rest/sleep promoted  Taken 5/31/2024 2100 by Dorothy Buenrostro RN  Infection Prevention: hand hygiene promoted  Taken 5/31/2024 2050 by Dorothy Buenrostro RN  Infection Prevention:   equipment surfaces disinfected   hand hygiene promoted  Taken 5/31/2024 1910 by Dorothy Buenrostro RN  Infection Prevention:   hand hygiene promoted   rest/sleep promoted  Goal: Optimal Comfort and Wellbeing  Outcome: Ongoing, Progressing  Intervention: Monitor Pain and Promote Comfort  Recent Flowsheet Documentation  Taken 5/31/2024 2200 by Dorothy Buenrostro RN  Pain Management Interventions:   care clustered   pillow support provided   position adjusted  Taken 5/31/2024 2050 by Dorothy Buenrostro RN  Pain Management Interventions: see MAR  Goal: Readiness for Transition of Care  Outcome: Ongoing, Progressing     Problem: Noninvasive Ventilation Acute  Goal: Effective Unassisted Ventilation and Oxygenation  Outcome: Ongoing, Progressing     Problem: Adjustment to Illness (Sepsis/Septic Shock)  Goal: Optimal Coping  Outcome: Ongoing, Progressing  Intervention: Optimize Psychosocial Adjustment to Illness  Recent Flowsheet Documentation  Taken 5/31/2024 2050 by Dorothy Buenrostro  RN  Supportive Measures: active listening utilized     Problem: Bleeding (Sepsis/Septic Shock)  Goal: Absence of Bleeding  Outcome: Ongoing, Progressing     Problem: Glycemic Control Impaired (Sepsis/Septic Shock)  Goal: Blood Glucose Level Within Desired Range  Outcome: Ongoing, Progressing     Problem: Infection Progression (Sepsis/Septic Shock)  Goal: Absence of Infection Signs and Symptoms  Outcome: Ongoing, Progressing  Intervention: Initiate Sepsis Management  Recent Flowsheet Documentation  Taken 6/1/2024 0500 by Dorothy Buenrostro RN  Stabilization Measures: legs elevated  Infection Prevention: rest/sleep promoted  Taken 6/1/2024 0300 by Dorothy Buenrostro RN  Infection Prevention: rest/sleep promoted  Taken 6/1/2024 0100 by Dorothy Buenrostro RN  Stabilization Measures: legs elevated  Infection Prevention: rest/sleep promoted  Taken 5/31/2024 2300 by Dorothy Buenrostro RN  Stabilization Measures: legs elevated  Infection Prevention: rest/sleep promoted  Taken 5/31/2024 2100 by Dorothy Buenrostro RN  Stabilization Measures: legs elevated  Infection Prevention: hand hygiene promoted  Taken 5/31/2024 2050 by Dorothy Buenrostro RN  Infection Prevention:   equipment surfaces disinfected   hand hygiene promoted  Taken 5/31/2024 1910 by Dorothy Buenrostro RN  Stabilization Measures: legs elevated  Infection Prevention:   hand hygiene promoted   rest/sleep promoted  Intervention: Promote Recovery  Recent Flowsheet Documentation  Taken 6/1/2024 0500 by Dorothy Buenrostro RN  Activity Management: (pt sleeping) other (see comments)  Taken 6/1/2024 0300 by Dorothy Buenrostro RN  Activity Management: (pt sleeping) other (see comments)  Taken 6/1/2024 0100 by Dorothy Buenrostro RN  Activity Management: (pt sleeping) other (see comments)  Taken 5/31/2024 2300 by Dorothy Buenrostro RN  Activity Management: (patient sleeping) other (see comments)  Taken 5/31/2024 2100 by Dorothy Buenrostro RN  Activity  Management: activity encouraged  Taken 5/31/2024 2050 by Dorothy Buenrostro RN  Activity Management: activity encouraged     Problem: Nutrition Impaired (Sepsis/Septic Shock)  Goal: Optimal Nutrition Intake  Outcome: Ongoing, Progressing     Problem: COPD (Chronic Obstructive Pulmonary Disease) Comorbidity  Goal: Maintenance of COPD Symptom Control  Outcome: Ongoing, Progressing  Intervention: Maintain COPD-Symptom Control  Recent Flowsheet Documentation  Taken 6/1/2024 0500 by Dorothy Buenrostro RN  Medication Review/Management: medications reviewed  Taken 6/1/2024 0300 by Dorothy Buenrostro RN  Medication Review/Management: medications reviewed  Taken 6/1/2024 0100 by Dorothy Buenrostro RN  Medication Review/Management: medications reviewed  Taken 5/31/2024 2300 by Dorothy Buenrostro RN  Medication Review/Management: medications reviewed  Taken 5/31/2024 2100 by Dorothy Buenrostro RN  Medication Review/Management: medications reviewed  Taken 5/31/2024 2050 by Dorothy Buenrostro RN  Supportive Measures: active listening utilized  Medication Review/Management: medications reviewed  Taken 5/31/2024 1910 by Dorothy Buenrostro RN  Medication Review/Management: medications reviewed     Problem: Hypertension Comorbidity  Goal: Blood Pressure in Desired Range  Outcome: Ongoing, Progressing  Intervention: Maintain Blood Pressure Management  Recent Flowsheet Documentation  Taken 6/1/2024 0500 by Dorothy Buenrostro RN  Medication Review/Management: medications reviewed  Taken 6/1/2024 0300 by Dorothy Buenrostro RN  Medication Review/Management: medications reviewed  Taken 6/1/2024 0100 by Dorothy Buenrostro RN  Medication Review/Management: medications reviewed  Taken 5/31/2024 2300 by Dorothy Buenrostro RN  Medication Review/Management: medications reviewed  Taken 5/31/2024 2100 by Dorothy Buenrostro RN  Medication Review/Management: medications reviewed  Taken 5/31/2024 2050 by Dorothy Buenrostro  RN  Medication Review/Management: medications reviewed  Taken 5/31/2024 1910 by Dorothy Buenrostro RN  Medication Review/Management: medications reviewed     Problem: Obstructive Sleep Apnea Risk or Actual Comorbidity Management  Goal: Unobstructed Breathing During Sleep  Outcome: Ongoing, Progressing     Problem: Skin Injury Risk Increased  Goal: Skin Health and Integrity  Outcome: Ongoing, Progressing  Intervention: Optimize Skin Protection  Recent Flowsheet Documentation  Taken 5/31/2024 2300 by Dorothy Buenrostro RN  Head of Bed ([facility]) Positioning: HOB elevated  Taken 5/31/2024 2050 by Dorothy Buenrostro RN  Pressure Reduction Techniques:   pressure points protected   weight shift assistance provided  Head of Bed ([facility]) Positioning: HOB elevated  Pressure Reduction Devices: positioning supports utilized  Taken 5/31/2024 1910 by Dorothy Buenrostro RN  Head of Bed ([facility]) Positioning: HOB elevated     Problem: Fall Injury Risk  Goal: Absence of Fall and Fall-Related Injury  Outcome: Ongoing, Progressing  Intervention: Identify and Manage Contributors  Recent Flowsheet Documentation  Taken 6/1/2024 0500 by Dorothy Buenrostro RN  Medication Review/Management: medications reviewed  Taken 6/1/2024 0300 by Dorothy Buenrostro RN  Medication Review/Management: medications reviewed  Taken 6/1/2024 0100 by Dorothy Buenrostro RN  Medication Review/Management: medications reviewed  Taken 5/31/2024 2300 by Dorothy Buenrostro RN  Medication Review/Management: medications reviewed  Taken 5/31/2024 2100 by Dorothy Buenrostro RN  Medication Review/Management: medications reviewed  Taken 5/31/2024 2050 by Dorothy Buenrostro, RN  Medication Review/Management: medications reviewed  Taken 5/31/2024 1910 by Dorothy Buenrostro RN  Medication Review/Management: medications reviewed  Intervention: Promote Injury-Free Environment  Recent Flowsheet Documentation  Taken 6/1/2024 0500 by Dorothy Buenrostro, RN  Safety Promotion/Fall  Prevention: safety round/check completed  Taken 6/1/2024 0300 by Dorothy Buenrostro RN  Safety Promotion/Fall Prevention: safety round/check completed  Taken 6/1/2024 0100 by Dorothy Buenrostro RN  Safety Promotion/Fall Prevention: safety round/check completed  Taken 5/31/2024 2300 by Dorothy Buenrostro RN  Safety Promotion/Fall Prevention: safety round/check completed  Taken 5/31/2024 2100 by Dorothy Buenrostro RN  Safety Promotion/Fall Prevention: safety round/check completed  Taken 5/31/2024 2050 by Dorothy Buenrostro RN  Safety Promotion/Fall Prevention: safety round/check completed  Taken 5/31/2024 1910 by Dorothy Buenrostro RN  Safety Promotion/Fall Prevention: safety round/check completed   Goal Outcome Evaluation:

## 2024-06-01 NOTE — PLAN OF CARE
Goal Outcome Evaluation:  Plan of Care Reviewed With: patient        Progress: no change  Outcome Evaluation: Patient is awake this morning on 2L nasal cannula tolerating well with bipap on standby at bedside.

## 2024-06-01 NOTE — PROGRESS NOTES
Pulmonary / Critical Care Progress Note      Patient Name: Charlette Rai  : 1935  MRN: [id number]  Primary Care Physician:  Rafael Lyons MD  Date of admission: 2024    Subjective   Subjective   Follow-up for volume overload and pneumonia    No acute events overnight.     This morning,   Sitting up in bed  Overall feeling better  Remains on 2 L nasal cannula  Dyspnea improved  Denies cough  No chest pain or hemoptysis  No fever or chills  Remains weak and fatigued      Objective   Objective     Vitals:   Temp:  [97.3 °F (36.3 °C)-97.9 °F (36.6 °C)] 97.3 °F (36.3 °C)  Heart Rate:  [77-96] 79  Resp:  [16-20] 20  BP: (106-167)/(43-86) 149/46  Flow (L/min):  [1-2] 1    Physical Exam   Vital Signs Reviewed   General: Frail appearing, elderly female, alert, NAD, sitting up in bed on 2 L NC  Chest: Poor aeration, fine bibasilar crackles, no work of breathing noted  CV: RRR, no MGR, pulses 2+, equal.  Abd:  Soft, NT, ND, + BS, no HSM  EXT:  no clubbing, no cyanosis, no edema  Neuro:  A&Ox3, CN grossly intact, no focal deficits.  Skin: No rashes or lesions noted      Result Review    Result Review:  I have personally reviewed the results from the time of this admission to 2024 15:55 EDT and agree with these findings:  [x]  Laboratory  [x]  Microbiology  [x]  Radiology  [x]  EKG/Telemetry   []  Cardiology/Vascular   []  Pathology  []  Old records  []  Other:  Most notable findings include:         Lab 24  0505 24  0620 24  0729 24  0559   WBC 8.50 7.52  --  10.27   HEMOGLOBIN 8.8* 9.9*  --  10.7*   HEMATOCRIT 29.7* 33.0*  --  36.6   PLATELETS 143 133*  --  176   SODIUM 130* 133*  --  136   SODIUM, ARTERIAL  --   --  134.9*  --    POTASSIUM 5.3* 5.4*  --  4.3   CHLORIDE 94* 95*  --  96*   CO2 25.4 24.9  --  35.2*   BUN 54* 30*  --  23   CREATININE 3.02* 2.44*  --  2.04*   GLUCOSE 191* 276*  --  268*   GLUCOSE, ARTERIAL  --   --  213*  --    CALCIUM 8.2* 8.9  --  8.6   PHOSPHORUS 4.4   --   --   --    TOTAL PROTEIN  --   --   --  6.4   ALBUMIN  --   --   --  3.7   GLOBULIN  --   --   --  2.7     XR Chest 1 View    Result Date: 5/31/2024  AP PORTABLE CHEST  HISTORY: Hypoxia and dyspnea.  COMPARISON: 5/30/2024  TECHNIQUE: AP portable chest x-ray.  FINDINGS: Dialysis catheter in good position. Infiltrates at the right lung base have improved. Interstitial changes in the right lung overall are improved as well. The left hemithorax is stable, as is some fibrotic change in the right midlung. No pneumothorax.      Impression: Improved infiltrates in the right lung.  Electronically Signed By-Dr. Weston Castillo MD On:5/31/2024 5:48 AM      XR Chest 1 View    Result Date: 5/30/2024  AP PORTABLE CHEST  HISTORY: Shortness of air. History of left lung cancer.  COMPARISON: 5/1/2024  TECHNIQUE: AP portable chest x-ray.  FINDINGS: Dialysis catheter tip remains in the lower SVC. There is extensive atherosclerotic calcification in the aorta. Postoperative changes are noted in the left lung. Pleural plaques and pleural thickening in the left hemithorax are similar to prior. Areas of chronic consolidation/scarring in the left lung also appears stable. 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 There are new infiltrates at the right lung base that likely reflect pneumonia. No pneumothorax. Background COPD is present.      Impression: New right basilar infiltrates concerning for pneumonia. The remainder of the chest x-ray is largely stable from prior.  Electronically Signed By-Dr. Weston Castillo MD On:5/30/2024 6:08 AM       Assessment & Plan   Assessment / Plan     Active Hospital Problems:  Active Hospital Problems    Diagnosis     **Acute on chronic respiratory failure with hypoxia and hypercapnia     Elevated troponin level not due myocardial infarction     Aortic stenosis, moderate     Acute on chronic heart failure with preserved ejection fraction (HFpEF)      Impression:   Recurrent pneumonia, right lower  lobe pneumonia  Recent history of Pseudomonas pneumonia  Possible hypertensive urgency  Volume overload  End-stage renal disease on dialysis  COPD with acute exacerbation  History of lung cancer status post surgery and radiation in past    Plan:   -Continue O2 to maintain SpO2 greater than 90%  -Continue NIPPV at night and as needed days with naps  -AM CXR with improved infiltrates likely secondary to pulmonary edema  -Nephrology on board.  Appreciate assistance. Dialysis timing per nephrology.  -MRSA PCR negative.  Vancomycin discontinued.  -Cefepime discontinued.  Micro negative.  -Solu-Medrol decreased to 60 mg twice daily  -Continue Brovana, Pulmicort and DuoNebs  -Continue JUAN nebs  -Continue BPH.  -Echocardiogram with EF of 55%, grade 1 diastolic dysfunction, moderate aortic valve stenosis with moderately elevated RVSP  -Vascular surgery consulted for renal artery stenosis.  Appreciate assistance.  -Encourage mobilization.  Out of bed to chair.      DVT prophylaxis:  Medical and mechanical DVT prophylaxis orders are present.        CODE STATUS:   Code Status (Patient has no pulse and is not breathing): CPR (Attempt to Resuscitate)  Medical Interventions (Patient has pulse or is breathing): Full Support  Comments: DNI      I personally reviewed pertinent labs, imaging and provider notes. Discussed with bedside nurse and will discuss with primary service.       Electronically signed by DAT Carballo, 6/1/2024, 15:55 EDT.  This patient was seen by both a physician and a NP. I, Rosalinda Bocanegra MD, spent >50% of time in accordance with split shared billing. This included personally reviewing all pertinent labs, imaging, microbiology and documentation. Also discussing the case with the patient and any available family, the admitting physician and any available ancillary staff.   Electronically signed by Rosalinda Bocanegra MD, 06/01/24, 8:42 PM EDT.

## 2024-06-01 NOTE — PROGRESS NOTES
RT EQUIPMENT DEVICE RELATED - SKIN ASSESSMENT    RT Medical Equipment/Device:     NIV Mask:  Full-face    size: xs    Skin Assessment:      Cheek:  Intact  Neck:  Intact  Nose:  Intact    Device Skin Pressure Protection:  Skin-to-device areas padded:  None Required    Nurse Notification:  Gladis Chun, RRT

## 2024-06-01 NOTE — PLAN OF CARE
Goal Outcome Evaluation:  Plan of Care Reviewed With: patient           Outcome Evaluation: Pt remains A&Ox4. VSS. Pt maintaining SpO2 >90% on 2L NC. Pt to dialysis this morning. 3.5L off. Pt able to transfer to bedside commode with x1 assistance. Continuing plan of care at this time.

## 2024-06-01 NOTE — PROGRESS NOTES
[facility]     Cardiology Progress Note    Patient Name: Charlette Rai  : 1935  MRN: [id number]  Primary Care Physician:  Rafael Lyons MD  Date of admission: 2024    Subjective   Subjective   Chief complaint  Shortness of breath    HPI:  Patient Reports her shortness of breath has improved with dialysis.    Review of Systems   All systems were reviewed and negative except for: Mild shortness of breath    Objective   Objective     Vitals:   Temp:  [97.3 °F (36.3 °C)-97.9 °F (36.6 °C)] 97.6 °F (36.4 °C)  Heart Rate:  [77-96] 78  Resp:  [16-20] 20  BP: (106-166)/(43-86) 160/61  Flow (L/min):  [1-2] 2  Physical Exam   Neck: no JVD, no bruit  Lungs: clear to ausculation bilaterally.  No crackles or wheezes  CV: regular rate and rhythm, no murmur  Ext: no cyanosis, clubbing or edema.  Normal bilateral LE pulses.      Scheduled Meds:arformoterol, 15 mcg, Nebulization, BID - RT  aspirin, 81 mg, Oral, Daily  budesonide, 0.5 mg, Nebulization, BID - RT  carvedilol, 12.5 mg, Oral, BID With Meals  insulin glargine, 10 Units, Subcutaneous, Daily  insulin lispro, 2-7 Units, Subcutaneous, 4x Daily AC & at Bedtime  ipratropium-albuterol, 3 mL, Nebulization, Q4H  isosorbide dinitrate, 40 mg, Oral, TID - Nitrates  losartan, 50 mg, Oral, Q24H  methylPREDNISolone sodium succinate, 60 mg, Intravenous, Q8H  pantoprazole, 40 mg, Oral, BID AC  rosuvastatin, 20 mg, Oral, Daily  sevelamer, 800 mg, Oral, TID With Meals  sodium chloride, 10 mL, Intravenous, Q12H  tobramycin PF, 300 mg, Nebulization, BID - RT      Continuous Infusions:        Result Review    Result Review:  I have personally reviewed the results from the time of this admission to 2024 12:31 EDT and agree with these findings:  [x]  Laboratory  []  Microbiology  [x]  Radiology  [x]  EKG/Telemetry   [x]  Cardiology/Vascular   []  Pathology  []  Old records  []  Other:  Most notable findings include:     CBC          2024    05:59 2024    06:20  6/1/2024    05:05   CBC   WBC 10.27  7.52  8.50    RBC 3.57  3.22  2.94    Hemoglobin 10.7  9.9  8.8    Hematocrit 36.6  33.0  29.7    .5  102.5  101.0    MCH 30.0  30.7  29.9    MCHC 29.2  30.0  29.6    RDW 14.5  14.6  14.8    Platelets 176  133  143      CMP          5/30/2024    05:59 5/30/2024    07:29 5/31/2024    06:20 6/1/2024    05:05   CMP   Glucose 268  213  276  191    BUN 23   30  54    Creatinine 2.04   2.44  3.02    EGFR 23.2   18.7  14.5    Sodium 136  134.9  133  130    Potassium 4.3   5.4  5.3    Chloride 96   95  94    Calcium 8.6   8.9  8.2    Total Protein 6.4       Albumin 3.7       Globulin 2.7       Total Bilirubin 0.2       Alkaline Phosphatase 191       AST (SGOT) 24       ALT (SGPT) 16       Albumin/Globulin Ratio 1.4       BUN/Creatinine Ratio 11.3   12.3  17.9    Anion Gap 4.8   13.1  10.6       CARDIAC LABS:      Lab 05/31/24  0922 05/31/24  0620 05/30/24  1229 05/30/24  0811 05/30/24  0559   PROBNP  --   --   --   --  18,671.0*   HSTROP T 153* 151* 163* 108* 66*   PROTIME  --   --  13.6  --  13.9   INR  --   --  1.02  --  1.05        Assessment & Plan   Assessment / Plan     Brief Patient Summary:  Charlette Rai is a 89 y.o. female who presented with shortness of breath.  She does have a history of lung cancer, end-stage renal disease, heart failure with preserved ejection fraction and COPD.    Active Hospital Problems:  Active Hospital Problems    Diagnosis     **Acute on chronic respiratory failure with hypoxia and hypercapnia     Elevated troponin level not due myocardial infarction     Aortic stenosis, moderate     Acute on chronic heart failure with preserved ejection fraction (HFpEF)        Assessment:  1.  Shortness of breath  2.  Elevated troponins most consistent with type II non-STEMI due to end-stage renal disease.  3.  Elevated beta natruretic peptide in the setting of end-stage renal disease.  4.  Hypertension    Plan:   1.  Agree with the aspirin and Crestor.  2.   Agree with Coreg, isosorbide dinitrate and losartan.  3.  Patient had echocardiogram that showed normal ejection fraction with no wall motion abnormalities.  She had mild to moderate LVH and moderate valvular disease.  4.  Patient is being followed by nephrology and her shortness of breath has improved with dialysis.  5.  Patient was seen by vascular surgery due to unilateral renal artery stenosis.  6.  We will follow from a distance, call with any questions.       CODE STATUS:   Code Status (Patient has no pulse and is not breathing): CPR (Attempt to Resuscitate)  Medical Interventions (Patient has pulse or is breathing): Full Support  Comments: DNI      Electronically signed by Justus Wills MD, 06/01/24, 12:31 PM EDT.

## 2024-06-01 NOTE — NURSING NOTE
Duration of Treatment 3.5 Hours   Access Site Tunneled Dialysis Catheter   Dialyzer Revaclear    mL/min   Dialysate Temperature (C) 36   Use Crit-Line? Yes   BFR-As tolerated to a maximum of: 400 mL/min   Prime Dialyzer With NS to Priming Volume? Yes   Dialysate Solution Bath: K+ = 2 mEq, Ca = 2.5mEq   Bicarb Other   Bicarb Comments 38   Na+ Other   Na+ comments 138   Fluid Removal: 3.5kg.     Dialysis treatment completed without difficulty tolerated well. No Crit Line Available. Removed 3.5 Liters processed 84.0 Liters of Blood. Last B/P 167/71 HR 86.     Report called to Giorgio Elaine RN

## 2024-06-01 NOTE — PROGRESS NOTES
[facility]     Nephrology Progress Note      Patient Name: Charlette Rai  : 1935  MRN: [id number]  Primary Care Physician:  Rafael Lyons MD  Date of admission: 2024    Subjective   Subjective     Interval History:  Patient Reports feeling a lot better.  On O2 per nasal cannula this morning.  Less short of breath, still with intermittent cough.,  Seen in dialysis.      Review of Systems   All systems were reviewed and negative except for: What is mentioned above.    Objective   Objective     Vitals:   Temp:  [97.3 °F (36.3 °C)-97.9 °F (36.6 °C)] 97.9 °F (36.6 °C)  Heart Rate:  [77-96] 86  Resp:  [16-20] 20  BP: (106-167)/(43-86) 167/71  Flow (L/min):  [1-2] 2  Physical Exam:   Constitutional: Awake, alert, no distress, mild conversational dyspnea   Eyes: sclerae anicteric, no conjunctival injection   HENT: mucous membranes moist   Neck: Supple, no thyromegaly, no lymphadenopathy, trachea midline, + JVD   Respiratory: Decreased to auscultation bilaterally with scattered rhonchi, nonlabored respirations    Cardiovascular: RRR, no murmurs, rubs, or gallops.   Gastrointestinal: Positive bowel sounds, soft, nontender, nondistended   Musculoskeletal: No edema, no clubbing or cyanosis, left upper extremity AV graft patent, right IJ TDC in place.   Psychiatric: Appropriate affect, cooperative   Neurologic: Oriented x 3, moving all extremities, Cranial Nerves grossly intact, speech clear   Skin: warm and dry, no rashes.    Result Review    Result Reviewed:  I have personally reviewed the results from the time of this admission to 2024 14:07 EDT and agree with these findings:  [x]  Laboratory  []  Microbiology  [x]  Radiology  []  EKG/Telemetry   []  Cardiology/Vascular   []  Pathology  []  Old records  []  Other:        Lab 24  0505 24  0620 24  0729 24  0559   SODIUM 130* 133*  --  136   SODIUM, ARTERIAL  --   --  134.9*  --    POTASSIUM 5.3* 5.4*  --  4.3   CHLORIDE 94* 95*   --  96*   CO2 25.4 24.9  --  35.2*   BUN 54* 30*  --  23   CREATININE 3.02* 2.44*  --  2.04*   GLUCOSE 191* 276*  --  268*   GLUCOSE, ARTERIAL  --   --  213*  --    EGFR 14.5* 18.7*  --  23.2*   ANION GAP 10.6 13.1  --  4.8*   MAGNESIUM 2.2  --   --   --    PHOSPHORUS 4.4  --   --   --            Most notable findings include: Reviewed labs.  Potassium 5.3.    Assessment & Plan   Assessment / Plan       Active Hospital Problems:  Active Hospital Problems    Diagnosis     **Acute on chronic respiratory failure with hypoxia and hypercapnia     Elevated troponin level not due myocardial infarction     Aortic stenosis, moderate     Acute on chronic heart failure with preserved ejection fraction (HFpEF)        Assessment and Plan:    - ESRD, dialysis MWF through right IJ TDC, [place], [place].  Here with acute respiratory failure I suspect she has flash pulmonary edema might be precipitated by flash pulmonary edema from renal artery stenosis seen previously on CT angiogram.  Repeat CT angiogram showed high-grade left renal artery stenosis.  Vascular evaluating, possible stenting.  Patient would rather not use her AV graft until she is feeling better.  Will use TDC for dialysis tomorrow  - Severe hypertension, acute on chronic, she is in respiratory distress, improving.  Continue current blood pressure medications.    - Possible pneumonia right lower quadrant, on antibiotics, per primary.  - Anemia of chronic kidney disease, hemoglobin at goal.  - Severe peripheral vascular disease.  - Diastolic dysfunction with LVH, EF 55%.    - Type 2 diabetes with complications.  - Mild hyperkalemia, should improve with dialysis and low potassium diet.  Discussed with dialysis staff.  Will follow.      Electronically signed by Edi García MD, 6/1/2024, 14:07 EDT.

## 2024-06-01 NOTE — PROGRESS NOTES
[facility]     Progress Note    Patient Name: Charlette Rai  : 1935  MRN: [id number]  Primary Care Physician:  Rafael Lyons MD  Date of admission: 2024    Subjective   Subjective     Patient is overall feeling better.  Still has significant shortness of breath.  Hypotension improved overnight.    Objective   Objective     Vitals:   Temp:  [97.3 °F (36.3 °C)-97.9 °F (36.6 °C)] 97.3 °F (36.3 °C)  Heart Rate:  [77-96] 85  Resp:  [16-18] 18  BP: (106-154)/(43-86) 154/52  Flow (L/min):  [1-2] 2    Labs     Results from last 7 days   Lab Units 24  0505 24  0620 24  0559   WBC 10*3/mm3 8.50 7.52 10.27   HEMOGLOBIN g/dL 8.8* 9.9* 10.7*   HEMATOCRIT % 29.7* 33.0* 36.6   PLATELETS 10*3/mm3 143 133* 176         Results from last 7 days   Lab Units 24  0505 24  0620 24  0559   CREATININE mg/dL 3.02* 2.44* 2.04*         Results from last 7 days   Lab Units 24  1229   INR  1.02          Lab 24  0000   HEMOGLOBIN A1C 5.4         Lab Results   Component Value Date    LDL 36 2023              Physical Exam   Right radial is palpable.  Femoral pulses are palpable left stronger.  Left radial is not palpable.  Left arm AV graft is in place.        Assessment & Plan   Assessment / Plan     Active Hospital Problems:  Active Hospital Problems    Diagnosis     **Acute on chronic respiratory failure with hypoxia and hypercapnia     Elevated troponin level not due myocardial infarction     Aortic stenosis, moderate     Acute on chronic heart failure with preserved ejection fraction (HFpEF)          Assessment/Plan:    CT images reviewed.  High-grade left renal stenosis.  Right renal does not have any significant stenosis.  Severe SMA stenosis.  Right subcapsular hematoma is improved.  Severe right common femoral artery calcific disease.  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  Renal size is approximately 9 cm bilaterally.   Cortical atrophy much pronounced on the left.  Will discuss with the nephrology team.  If hypertension is an ongoing issue we will place left renal stent.  Her renal function might actually improve from this.  If there is possibility that she does not require consistent dialysis, removing the left arm AV graft would also improve her preload.  Will decide further intervention and timing after discussion with nephrology team     Electronically signed by Dio Miguel MD, 06/01/24, 8:09 AM EDT.

## 2024-06-02 LAB
ANION GAP SERPL CALCULATED.3IONS-SCNC: 9.8 MMOL/L (ref 5–15)
BUN SERPL-MCNC: 37 MG/DL (ref 8–23)
BUN/CREAT SERPL: 14.5 (ref 7–25)
CALCIUM SPEC-SCNC: 8 MG/DL (ref 8.6–10.5)
CHLORIDE SERPL-SCNC: 98 MMOL/L (ref 98–107)
CO2 SERPL-SCNC: 26.2 MMOL/L (ref 22–29)
CREAT SERPL-MCNC: 2.56 MG/DL (ref 0.57–1)
DEPRECATED RDW RBC AUTO: 54.4 FL (ref 37–54)
EGFRCR SERPLBLD CKD-EPI 2021: 17.7 ML/MIN/1.73
ERYTHROCYTE [DISTWIDTH] IN BLOOD BY AUTOMATED COUNT: 14.7 % (ref 12.3–15.4)
GLUCOSE BLDC GLUCOMTR-MCNC: 118 MG/DL (ref 70–99)
GLUCOSE BLDC GLUCOMTR-MCNC: 125 MG/DL (ref 70–99)
GLUCOSE BLDC GLUCOMTR-MCNC: 177 MG/DL (ref 70–99)
GLUCOSE BLDC GLUCOMTR-MCNC: 213 MG/DL (ref 70–99)
GLUCOSE SERPL-MCNC: 147 MG/DL (ref 65–99)
HCT VFR BLD AUTO: 29.8 % (ref 34–46.6)
HGB BLD-MCNC: 9 G/DL (ref 12–15.9)
MAGNESIUM SERPL-MCNC: 2 MG/DL (ref 1.6–2.4)
MCH RBC QN AUTO: 30.3 PG (ref 26.6–33)
MCHC RBC AUTO-ENTMCNC: 30.2 G/DL (ref 31.5–35.7)
MCV RBC AUTO: 100.3 FL (ref 79–97)
PHOSPHATE SERPL-MCNC: 3.7 MG/DL (ref 2.5–4.5)
PLATELET # BLD AUTO: 139 10*3/MM3 (ref 140–450)
PMV BLD AUTO: 10.3 FL (ref 6–12)
POTASSIUM SERPL-SCNC: 4.2 MMOL/L (ref 3.5–5.2)
RBC # BLD AUTO: 2.97 10*6/MM3 (ref 3.77–5.28)
SODIUM SERPL-SCNC: 134 MMOL/L (ref 136–145)
WBC NRBC COR # BLD AUTO: 8.06 10*3/MM3 (ref 3.4–10.8)

## 2024-06-02 PROCEDURE — 25010000002 METHYLPREDNISOLONE PER 125 MG: Performed by: NURSE PRACTITIONER

## 2024-06-02 PROCEDURE — 80048 BASIC METABOLIC PNL TOTAL CA: CPT | Performed by: INTERNAL MEDICINE

## 2024-06-02 PROCEDURE — 85027 COMPLETE CBC AUTOMATED: CPT | Performed by: INTERNAL MEDICINE

## 2024-06-02 PROCEDURE — 94761 N-INVAS EAR/PLS OXIMETRY MLT: CPT

## 2024-06-02 PROCEDURE — 94799 UNLISTED PULMONARY SVC/PX: CPT

## 2024-06-02 PROCEDURE — 63710000001 INSULIN GLARGINE PER 5 UNITS: Performed by: INTERNAL MEDICINE

## 2024-06-02 PROCEDURE — 82948 REAGENT STRIP/BLOOD GLUCOSE: CPT | Performed by: INTERNAL MEDICINE

## 2024-06-02 PROCEDURE — 99233 SBSQ HOSP IP/OBS HIGH 50: CPT | Performed by: STUDENT IN AN ORGANIZED HEALTH CARE EDUCATION/TRAINING PROGRAM

## 2024-06-02 PROCEDURE — 99232 SBSQ HOSP IP/OBS MODERATE 35: CPT | Performed by: INTERNAL MEDICINE

## 2024-06-02 PROCEDURE — 63710000001 INSULIN LISPRO (HUMAN) PER 5 UNITS: Performed by: INTERNAL MEDICINE

## 2024-06-02 PROCEDURE — 83735 ASSAY OF MAGNESIUM: CPT | Performed by: INTERNAL MEDICINE

## 2024-06-02 PROCEDURE — 82948 REAGENT STRIP/BLOOD GLUCOSE: CPT

## 2024-06-02 PROCEDURE — 63710000001 PREDNISONE PER 1 MG: Performed by: NURSE PRACTITIONER

## 2024-06-02 PROCEDURE — 94664 DEMO&/EVAL PT USE INHALER: CPT

## 2024-06-02 PROCEDURE — 84100 ASSAY OF PHOSPHORUS: CPT | Performed by: INTERNAL MEDICINE

## 2024-06-02 RX ORDER — PREDNISONE 20 MG/1
40 TABLET ORAL
Status: DISCONTINUED | OUTPATIENT
Start: 2024-06-02 | End: 2024-06-04 | Stop reason: HOSPADM

## 2024-06-02 RX ADMIN — CARVEDILOL 12.5 MG: 12.5 TABLET, FILM COATED ORAL at 17:47

## 2024-06-02 RX ADMIN — Medication 10 ML: at 21:20

## 2024-06-02 RX ADMIN — IPRATROPIUM BROMIDE AND ALBUTEROL SULFATE 3 ML: .5; 3 SOLUTION RESPIRATORY (INHALATION) at 19:45

## 2024-06-02 RX ADMIN — SEVELAMER CARBONATE 800 MG: 800 TABLET, FILM COATED ORAL at 12:47

## 2024-06-02 RX ADMIN — TOBRAMYCIN 300 MG: 300 SOLUTION RESPIRATORY (INHALATION) at 07:40

## 2024-06-02 RX ADMIN — INSULIN LISPRO 3 UNITS: 100 INJECTION, SOLUTION INTRAVENOUS; SUBCUTANEOUS at 21:20

## 2024-06-02 RX ADMIN — TOBRAMYCIN 300 MG: 300 SOLUTION RESPIRATORY (INHALATION) at 19:45

## 2024-06-02 RX ADMIN — CARVEDILOL 12.5 MG: 12.5 TABLET, FILM COATED ORAL at 08:14

## 2024-06-02 RX ADMIN — SEVELAMER CARBONATE 800 MG: 800 TABLET, FILM COATED ORAL at 08:14

## 2024-06-02 RX ADMIN — ARFORMOTEROL TARTRATE 15 MCG: 15 SOLUTION RESPIRATORY (INHALATION) at 07:40

## 2024-06-02 RX ADMIN — IPRATROPIUM BROMIDE AND ALBUTEROL SULFATE 3 ML: .5; 3 SOLUTION RESPIRATORY (INHALATION) at 07:40

## 2024-06-02 RX ADMIN — PANTOPRAZOLE SODIUM 40 MG: 40 TABLET, DELAYED RELEASE ORAL at 17:47

## 2024-06-02 RX ADMIN — PANTOPRAZOLE SODIUM 40 MG: 40 TABLET, DELAYED RELEASE ORAL at 08:14

## 2024-06-02 RX ADMIN — LOSARTAN POTASSIUM 50 MG: 50 TABLET, FILM COATED ORAL at 12:47

## 2024-06-02 RX ADMIN — METHYLPREDNISOLONE SODIUM SUCCINATE 60 MG: 125 INJECTION INTRAMUSCULAR; INTRAVENOUS at 01:17

## 2024-06-02 RX ADMIN — INSULIN LISPRO 2 UNITS: 100 INJECTION, SOLUTION INTRAVENOUS; SUBCUTANEOUS at 17:48

## 2024-06-02 RX ADMIN — PREDNISONE 40 MG: 20 TABLET ORAL at 11:52

## 2024-06-02 RX ADMIN — BUDESONIDE 0.5 MG: 0.5 SUSPENSION RESPIRATORY (INHALATION) at 07:40

## 2024-06-02 RX ADMIN — ASPIRIN 81 MG: 81 TABLET, COATED ORAL at 08:14

## 2024-06-02 RX ADMIN — IPRATROPIUM BROMIDE AND ALBUTEROL SULFATE 3 ML: .5; 3 SOLUTION RESPIRATORY (INHALATION) at 16:26

## 2024-06-02 RX ADMIN — IPRATROPIUM BROMIDE AND ALBUTEROL SULFATE 3 ML: .5; 3 SOLUTION RESPIRATORY (INHALATION) at 00:57

## 2024-06-02 RX ADMIN — INSULIN GLARGINE 10 UNITS: 100 INJECTION, SOLUTION SUBCUTANEOUS at 08:14

## 2024-06-02 RX ADMIN — ISOSORBIDE DINITRATE 40 MG: 20 TABLET ORAL at 12:47

## 2024-06-02 RX ADMIN — IPRATROPIUM BROMIDE AND ALBUTEROL SULFATE 3 ML: .5; 3 SOLUTION RESPIRATORY (INHALATION) at 23:19

## 2024-06-02 RX ADMIN — ISOSORBIDE DINITRATE 40 MG: 20 TABLET ORAL at 08:14

## 2024-06-02 RX ADMIN — BUDESONIDE 0.5 MG: 0.5 SUSPENSION RESPIRATORY (INHALATION) at 19:45

## 2024-06-02 RX ADMIN — ISOSORBIDE DINITRATE 40 MG: 20 TABLET ORAL at 17:47

## 2024-06-02 RX ADMIN — Medication 10 ML: at 08:14

## 2024-06-02 RX ADMIN — ROSUVASTATIN 20 MG: 20 TABLET, FILM COATED ORAL at 08:14

## 2024-06-02 RX ADMIN — SEVELAMER CARBONATE 800 MG: 800 TABLET, FILM COATED ORAL at 17:47

## 2024-06-02 RX ADMIN — IPRATROPIUM BROMIDE AND ALBUTEROL SULFATE 3 ML: .5; 3 SOLUTION RESPIRATORY (INHALATION) at 13:16

## 2024-06-02 RX ADMIN — ARFORMOTEROL TARTRATE 15 MCG: 15 SOLUTION RESPIRATORY (INHALATION) at 19:45

## 2024-06-02 NOTE — PROGRESS NOTES
[facility]   Hospitalist Progress Note  Date: 2024  Patient Name: Charlette Rai  : 1935  MRN: [id number]  Date of admission: 2024  Consultants:   -Cardiology: Dr. León Sanchez, Dr. Justus Wills  -Vascular surgery: Dr. Dio Miguel  -Pulmonology: Dr. Khalif Yanez, Dr. Rosalinda Bocanegra  -Nephrology: Dr. Edi García    Subjective   Subjective     Chief Complaint: Shortness of breath    Summary:   Charlette Rai is a 89 y.o. female with left lung cancer s/p resection and chemo, right lung cancer s/p radiation, COPD, CAD, ESRD on hemodialysis, essential hypertension who had recent admission to the hospital for Pseudomonas pneumonia and had discharged to rehab and subsequently discharged home from rehab for only a couple of days before she experienced a severe shortness of breath prompting her to come to ED for evaluation.  Patient significantly hypertensive and hypoxic.  Patient placed on BiPAP.  CXR showed right lung opacities.  Patient started on broad-spectrum empiric antibiotics.  Troponin was noted to be elevated higher than previous values and proBNP elevated as well, patient started on heparin drip.  Pulmonology, cardiology, vascular surgery and nephrology consulted.  Echocardiogram obtained, normal EF and no wall motion abnormalities noted, moderate LVH and moderate valvular disease.  CT imaging obtained and patient found to have high-grade left renal stenosis, severe SMA stenosis, severe right common femoral artery calcific disease.    Interval Followup:   No acute events overnight.  Patient denies any chest pain or shortness of breath.  Nursing with no additional acute issues to report.    Objective   Objective     Vitals:   Temp:  [97.3 °F (36.3 °C)-98.1 °F (36.7 °C)] 98.1 °F (36.7 °C)  Heart Rate:  [72-86] 73  Resp:  [18-20] 18  BP: (140-167)/(46-71) 149/56  Flow (L/min):  [1-2] 2  Physical Exam   Gen: No acute distress, sitting up in bed, pleasant, conversant  Resp: Improved aeration, normal  respiratory effort, equal chest rise bilaterally  Card: RRR, No m/r/g  Abd: Soft, Nontender, Nondistended, + bowel sounds    Result Review    Result Review:  I have personally reviewed the results as below and agree with these findings:  []  Laboratory:   CMP          5/31/2024    06:20 6/1/2024    05:05 6/2/2024    05:57   CMP   Glucose 276  191  147    BUN 30  54  37    Creatinine 2.44  3.02  2.56    EGFR 18.7  14.5  17.7    Sodium 133  130  134    Potassium 5.4  5.3  4.2    Chloride 95  94  98    Calcium 8.9  8.2  8.0    BUN/Creatinine Ratio 12.3  17.9  14.5    Anion Gap 13.1  10.6  9.8      CBC          5/31/2024    06:20 6/1/2024    05:05 6/2/2024    05:57   CBC   WBC 7.52  8.50  8.06    RBC 3.22  2.94  2.97    Hemoglobin 9.9  8.8  9.0    Hematocrit 33.0  29.7  29.8    .5  101.0  100.3    MCH 30.7  29.9  30.3    MCHC 30.0  29.6  30.2    RDW 14.6  14.8  14.7    Platelets 133  143  139    Phosphorus and magnesium within normal limits.  Blood glucose with improved control.  [x]  Microbiology: Blood culture (05/30/2024): No growth to date.  []  Radiology:   [x]  EKG/Telemetry:    []  Cardiology/Vascular:    []  Pathology:  []  Old records:  []  Other:    Assessment & Plan   Assessment / Plan     Assessment:  Acute COPD exacerbation  Recurrent right lower lobe pneumonia  Recent history of Pseudomonas pneumonia  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  -Continue supplemental O2 to maintain sats greater than 90%, wean as tolerated  -Continue Brovana, Pulmicort and  DuoNebs  -Continue JUAN nebs  -Hemodialysis per nephrology  -Continue prednisone 40 mg daily  -Continue carvedilol, isosorbide dinitrate, losartan  -Continue current insulin regimen  -Continue appropriate home medications  -PT/OT consulted  -Will monitor electrolytes and renal function with BMP and magnesium level in the AM  -Will monitor WBC and Hgb with CBC in the AM  -Clinical course will dictate further management     DVT Prophylaxis: SCDs  GI Prophylaxis: Pantoprazole  Diet:   Diet Order   Procedures    Diet: Regular/House, Renal, Fluid Restriction (240 mL/tray); Low Sodium (2-3g), Low Potassium; 1500 mL/day; Fluid Consistency: Thin (IDDSI 0)     Dispo: PT/OT consulted     Personally reviewed patients labs and imaging, discussed with patient and nurse at bedside. Discussed management with the following consultants: Cardiology, Vascular Surgery, Pulmonology and Nephrology.     Part of this note may be an electronic transcription/translation of spoken language to printed text using the Dragon dictation system.    DVT prophylaxis:  Medical and mechanical DVT prophylaxis orders are present.        CODE STATUS:   Code Status (Patient has no pulse and is not breathing): CPR (Attempt to Resuscitate)  Medical Interventions (Patient has pulse or is breathing): Full Support  Comments: DNI        Electronically signed by Pieter Salgado MD, 6/2/2024, 12:04 EDT.

## 2024-06-02 NOTE — PLAN OF CARE
Goal Outcome Evaluation:Pt stated she did not want to wear BIPAP. She said they want her to wear it, and she might if she needs it. She wears 1-2 liters.

## 2024-06-02 NOTE — PLAN OF CARE
Goal Outcome Evaluation:  Plan of Care Reviewed With: patient           Outcome Evaluation: Pt remains A&Ox4. VSS. Pt maintaining SpO2 >90% on 2L NC. Pt able to stand and pivot with x1 assist. Pt able to independently complete some ADLs in bed. Continuing plan of care at this time.

## 2024-06-02 NOTE — PROGRESS NOTES
Pulmonary / Critical Care Progress Note      Patient Name: Charlette Rai  : 1935  MRN: [id number]  Primary Care Physician:  Rafael Lyons MD  Date of admission: 2024    Subjective   Subjective   Follow-up for volume overload and pneumonia    No acute events overnight.     This morning,   Sitting up on side of bed  Overall feeling better  Currently on 1 L nasal cannula  Dyspnea improved  Denies cough  No chest pain or hemoptysis  No fever or chills  Remains weak and fatigued      Objective   Objective     Vitals:   Temp:  [97.3 °F (36.3 °C)-98.2 °F (36.8 °C)] 98.2 °F (36.8 °C)  Heart Rate:  [72-83] 73  Resp:  [18-20] 20  BP: (140-165)/(46-63) 165/57  Flow (L/min):  [1-2] 1    Physical Exam   Vital Signs Reviewed   General: Frail appearing, elderly female, alert, NAD, sitting up in bed on 1 L NC  Chest: Poor aeration, fine bibasilar crackles with scattered rhonchi, no work of breathing noted  CV: RRR, no MGR, pulses 2+, equal.  Abd:  Soft, NT, ND, + BS, no HSM  EXT:  no clubbing, no cyanosis, no edema  Neuro:  A&Ox3, CN grossly intact, no focal deficits.  Skin: No rashes or lesions noted      Result Review    Result Review:  I have personally reviewed the results from the time of this admission to 2024 14:39 EDT and agree with these findings:  [x]  Laboratory  [x]  Microbiology  [x]  Radiology  [x]  EKG/Telemetry   []  Cardiology/Vascular   []  Pathology  []  Old records  []  Other:  Most notable findings include:         Lab 24  0557 24  0505 24  0620 24  0729 24  0559   WBC 8.06 8.50 7.52  --  10.27   HEMOGLOBIN 9.0* 8.8* 9.9*  --  10.7*   HEMATOCRIT 29.8* 29.7* 33.0*  --  36.6   PLATELETS 139* 143 133*  --  176   SODIUM 134* 130* 133*  --  136   SODIUM, ARTERIAL  --   --   --  134.9*  --    POTASSIUM 4.2 5.3* 5.4*  --  4.3   CHLORIDE 98 94* 95*  --  96*   CO2 26.2 25.4 24.9  --  35.2*   BUN 37* 54* 30*  --  23   CREATININE 2.56* 3.02* 2.44*  --  2.04*   GLUCOSE 147*  191* 276*  --  268*   GLUCOSE, ARTERIAL  --   --   --  213*  --    CALCIUM 8.0* 8.2* 8.9  --  8.6   PHOSPHORUS 3.7 4.4  --   --   --    TOTAL PROTEIN  --   --   --   --  6.4   ALBUMIN  --   --   --   --  3.7   GLOBULIN  --   --   --   --  2.7     XR Chest 1 View    Result Date: 5/31/2024  AP PORTABLE CHEST  HISTORY: Hypoxia and dyspnea.  COMPARISON: 5/30/2024  TECHNIQUE: AP portable chest x-ray.  FINDINGS: Dialysis catheter in good position. Infiltrates at the right lung base have improved. Interstitial changes in the right lung overall are improved as well. The left hemithorax is stable, as is some fibrotic change in the right midlung. No pneumothorax.      Impression: Improved infiltrates in the right lung.  Electronically Signed By-Dr. Weston Castillo MD On:5/31/2024 5:48 AM       Assessment & Plan   Assessment / Plan     Active Hospital Problems:  Active Hospital Problems    Diagnosis     **Acute on chronic respiratory failure with hypoxia and hypercapnia     Elevated troponin level not due myocardial infarction     Aortic stenosis, moderate     Acute on chronic heart failure with preserved ejection fraction (HFpEF)      Impression:   Recurrent pneumonia, right lower lobe pneumonia  Recent history of Pseudomonas pneumonia  Possible hypertensive urgency  Volume overload  End-stage renal disease on dialysis  COPD with acute exacerbation  History of lung cancer status post surgery and radiation in past    Plan:   -Continue O2 to maintain SpO2 greater than 90%  -Continue NIPPV at night and as needed days with naps  -CXR with improved infiltrates likely secondary to pulmonary edema  -Nephrology on board.  Appreciate assistance. Dialysis timing per nephrology.  -MRSA PCR negative.  Vancomycin discontinued.  -Cefepime discontinued.  Micro negative.  -Solu-Medrol discontinued.  Start prednisone 40 mg daily.  -Continue Brovana, Pulmicort and DuoNebs  -Continue JUAN nebs  -RT  consulted.  Appreciate  assistance.  Will need home JUAN nebs.  -Continue BPH.  -Echocardiogram with EF of 55%, grade 1 diastolic dysfunction, moderate aortic valve stenosis with moderately elevated RVSP  -Vascular surgery consulted for renal artery stenosis.  Appreciate assistance.  -Encourage mobilization.  Out of bed to chair.      DVT prophylaxis:  Medical and mechanical DVT prophylaxis orders are present.        CODE STATUS:   Code Status (Patient has no pulse and is not breathing): CPR (Attempt to Resuscitate)  Medical Interventions (Patient has pulse or is breathing): Full Support  Comments: DNI    Electronically signed by DAT Carballo, 6/2/2024, 14:39 EDT.    This patient was seen by both a physician and a NP. I, Rosalinda Bocanegra MD, spent >50% of time in accordance with split shared billing. This included personally reviewing all pertinent labs, imaging, microbiology and documentation. Also discussing the case with the patient and any available family, the admitting physician and any available ancillary staff.   Electronically signed by Rosalinda Bocanegra MD, 06/02/24, 4:25 PM EDT.

## 2024-06-02 NOTE — PLAN OF CARE
Problem: Adult Inpatient Plan of Care  Goal: Optimal Comfort and Wellbeing  Outcome: Ongoing, Progressing  Intervention: Provide Person-Centered Care  Recent Flowsheet Documentation  Taken 6/1/2024 2000 by Adina Humphreys RN  Trust Relationship/Rapport:   care explained   choices provided   empathic listening provided   questions answered   questions encouraged   reassurance provided   thoughts/feelings acknowledged     Problem: Bleeding (Sepsis/Septic Shock)  Goal: Absence of Bleeding  Outcome: Ongoing, Progressing     Problem: Skin Injury Risk Increased  Goal: Skin Health and Integrity  Outcome: Ongoing, Progressing     Problem: Fall Injury Risk  Goal: Absence of Fall and Fall-Related Injury  Outcome: Ongoing, Progressing  Intervention: Promote Injury-Free Environment  Recent Flowsheet Documentation  Taken 6/2/2024 0300 by Adina Humphreys RN  Safety Promotion/Fall Prevention: safety round/check completed  Taken 6/2/2024 0100 by Adina Humphreys RN  Safety Promotion/Fall Prevention: safety round/check completed  Taken 6/1/2024 2300 by Adina Humphreys RN  Safety Promotion/Fall Prevention: safety round/check completed  Taken 6/1/2024 2100 by Adina Humphreys RN  Safety Promotion/Fall Prevention: safety round/check completed  Taken 6/1/2024 1900 by Adina Humphreys RN  Safety Promotion/Fall Prevention: safety round/check completed  Goal: Absence of Fall and Fall-Related Injury  Outcome: Ongoing, Progressing  Intervention: Promote Injury-Free Environment  Recent Flowsheet Documentation  Taken 6/2/2024 0300 by Adina Humphreys RN  Safety Promotion/Fall Prevention: safety round/check completed   Goal Outcome Evaluation:        Patient rested quietly this shift. No complaints of pain or requests for pain meds were made. Vs wnl.

## 2024-06-02 NOTE — PLAN OF CARE
Goal Outcome Evaluation:  Plan of Care Reviewed With: patient        Patient is on 2l nasal cannula this morning. She did not wear bipap last night.

## 2024-06-02 NOTE — PROGRESS NOTES
[facility]     Nephrology Progress Note      Patient Name: Charlette Rai  : 1935  MRN: [id number]  Primary Care Physician:  Rafael Lyons MD  Date of admission: 2024    Subjective   Subjective     Interval History:  Patient Reports feeling a lot better.  On O2 per nasal cannula this morning.  Less short of breath, still with intermittent cough.  Sitting in bedside.      Review of Systems   All systems were reviewed and negative except for: What is mentioned above.    Objective   Objective     Vitals:   Temp:  [97.3 °F (36.3 °C)-98.2 °F (36.8 °C)] 98.2 °F (36.8 °C)  Heart Rate:  [72-83] 73  Resp:  [18-20] 20  BP: (140-165)/(46-63) 165/57  Flow (L/min):  [1-2] 1  Physical Exam:   Constitutional: Awake, alert, no distress, mild conversational dyspnea   Eyes: sclerae anicteric, no conjunctival injection   HENT: mucous membranes moist   Neck: Supple, no thyromegaly, no lymphadenopathy, trachea midline, + JVD   Respiratory: Decreased to auscultation bilaterally with scattered rhonchi, nonlabored respirations    Cardiovascular: RRR, no murmurs, rubs, or gallops.   Gastrointestinal: Positive bowel sounds, soft, nontender, nondistended   Musculoskeletal: Trace edema, no clubbing or cyanosis, left upper extremity AV graft patent, right IJ TDC in place.   Psychiatric: Appropriate affect, cooperative   Neurologic: Oriented x 3, moving all extremities, Cranial Nerves grossly intact, speech clear   Skin: warm and dry, no rashes.    Result Review    Result Reviewed:  I have personally reviewed the results from the time of this admission to 2024 14:44 EDT and agree with these findings:  [x]  Laboratory  []  Microbiology  [x]  Radiology  []  EKG/Telemetry   []  Cardiology/Vascular   []  Pathology  []  Old records  []  Other:        Lab 24  0557 24  0505 24  0620 24  0729 24  0559   SODIUM 134* 130* 133*  --  136   SODIUM, ARTERIAL  --   --   --  134.9*  --    POTASSIUM 4.2 5.3*  5.4*  --  4.3   CHLORIDE 98 94* 95*  --  96*   CO2 26.2 25.4 24.9  --  35.2*   BUN 37* 54* 30*  --  23   CREATININE 2.56* 3.02* 2.44*  --  2.04*   GLUCOSE 147* 191* 276*  --  268*   GLUCOSE, ARTERIAL  --   --   --  213*  --    EGFR 17.7* 14.5* 18.7*  --  23.2*   ANION GAP 9.8 10.6 13.1  --  4.8*   MAGNESIUM 2.0 2.2  --   --   --    PHOSPHORUS 3.7 4.4  --   --   --            Most notable findings include: Reviewed labs.  Potassium 4.2    Assessment & Plan   Assessment / Plan       Active Hospital Problems:  Active Hospital Problems    Diagnosis     **Acute on chronic respiratory failure with hypoxia and hypercapnia     Elevated troponin level not due myocardial infarction     Aortic stenosis, moderate     Acute on chronic heart failure with preserved ejection fraction (HFpEF)        Assessment and Plan:    - ESRD, dialysis MWF through right IJ TDC, [place], [place].  Here with acute respiratory failure I suspect she has flash pulmonary edema possibly related to renal artery stenosis.  Discussed with vascular, will need left renal artery stenting but can be done as outpatient.  Patient would rather not use her AV graft until she is feeling better.  Will use TDC for dialysis tomorrow.  - Severe hypertension, acute on chronic, better.      - Possible pneumonia right lower quadrant, on antibiotics, per primary.  - Anemia of chronic kidney disease, hemoglobin at goal.  - Severe peripheral vascular disease.  - Diastolic dysfunction with LVH, EF 55%.    - Type 2 diabetes with complications.  - Mild hyperkalemia, improved, continue low potassium diet.    From renal standpoint, patient can be discharged home after dialysis tomorrow.  Vascular will set her up for outpatient left renal artery stenting.  Please call with any questions.  Will follow.      Electronically signed by Edi García MD, 6/2/2024, 14:44 EDT.

## 2024-06-03 DIAGNOSIS — I70.1 RENAL ARTERY STENOSIS: Primary | ICD-10-CM

## 2024-06-03 LAB
ANION GAP SERPL CALCULATED.3IONS-SCNC: 10.6 MMOL/L (ref 5–15)
BUN SERPL-MCNC: 52 MG/DL (ref 8–23)
BUN/CREAT SERPL: 15.7 (ref 7–25)
CALCIUM SPEC-SCNC: 7.8 MG/DL (ref 8.6–10.5)
CHLORIDE SERPL-SCNC: 97 MMOL/L (ref 98–107)
CO2 SERPL-SCNC: 26.4 MMOL/L (ref 22–29)
CREAT SERPL-MCNC: 3.32 MG/DL (ref 0.57–1)
DEPRECATED RDW RBC AUTO: 54 FL (ref 37–54)
EGFRCR SERPLBLD CKD-EPI 2021: 12.9 ML/MIN/1.73
ERYTHROCYTE [DISTWIDTH] IN BLOOD BY AUTOMATED COUNT: 14.7 % (ref 12.3–15.4)
GLUCOSE BLDC GLUCOMTR-MCNC: 104 MG/DL (ref 70–99)
GLUCOSE BLDC GLUCOMTR-MCNC: 143 MG/DL (ref 70–99)
GLUCOSE BLDC GLUCOMTR-MCNC: 366 MG/DL (ref 70–99)
GLUCOSE SERPL-MCNC: 151 MG/DL (ref 65–99)
HCT VFR BLD AUTO: 28.4 % (ref 34–46.6)
HGB BLD-MCNC: 8.7 G/DL (ref 12–15.9)
MAGNESIUM SERPL-MCNC: 2.2 MG/DL (ref 1.6–2.4)
MCH RBC QN AUTO: 30.3 PG (ref 26.6–33)
MCHC RBC AUTO-ENTMCNC: 30.6 G/DL (ref 31.5–35.7)
MCV RBC AUTO: 99 FL (ref 79–97)
PHOSPHATE SERPL-MCNC: 3.7 MG/DL (ref 2.5–4.5)
PLATELET # BLD AUTO: 138 10*3/MM3 (ref 140–450)
PMV BLD AUTO: 9.7 FL (ref 6–12)
POTASSIUM SERPL-SCNC: 3.8 MMOL/L (ref 3.5–5.2)
RBC # BLD AUTO: 2.87 10*6/MM3 (ref 3.77–5.28)
SODIUM SERPL-SCNC: 134 MMOL/L (ref 136–145)
WBC NRBC COR # BLD AUTO: 7.95 10*3/MM3 (ref 3.4–10.8)

## 2024-06-03 PROCEDURE — 82948 REAGENT STRIP/BLOOD GLUCOSE: CPT

## 2024-06-03 PROCEDURE — 94799 UNLISTED PULMONARY SVC/PX: CPT

## 2024-06-03 PROCEDURE — 94760 N-INVAS EAR/PLS OXIMETRY 1: CPT

## 2024-06-03 PROCEDURE — 63710000001 PREDNISONE PER 1 MG: Performed by: NURSE PRACTITIONER

## 2024-06-03 PROCEDURE — 99232 SBSQ HOSP IP/OBS MODERATE 35: CPT | Performed by: INTERNAL MEDICINE

## 2024-06-03 PROCEDURE — 97161 PT EVAL LOW COMPLEX 20 MIN: CPT

## 2024-06-03 PROCEDURE — 94618 PULMONARY STRESS TESTING: CPT

## 2024-06-03 PROCEDURE — 63710000001 INSULIN GLARGINE PER 5 UNITS: Performed by: INTERNAL MEDICINE

## 2024-06-03 PROCEDURE — 94761 N-INVAS EAR/PLS OXIMETRY MLT: CPT

## 2024-06-03 PROCEDURE — 84100 ASSAY OF PHOSPHORUS: CPT | Performed by: INTERNAL MEDICINE

## 2024-06-03 PROCEDURE — 25010000002 HEPARIN (PORCINE) PER 1000 UNITS: Performed by: INTERNAL MEDICINE

## 2024-06-03 PROCEDURE — 85027 COMPLETE CBC AUTOMATED: CPT | Performed by: INTERNAL MEDICINE

## 2024-06-03 PROCEDURE — 97166 OT EVAL MOD COMPLEX 45 MIN: CPT

## 2024-06-03 PROCEDURE — 80048 BASIC METABOLIC PNL TOTAL CA: CPT | Performed by: INTERNAL MEDICINE

## 2024-06-03 PROCEDURE — 63710000001 INSULIN LISPRO (HUMAN) PER 5 UNITS: Performed by: INTERNAL MEDICINE

## 2024-06-03 PROCEDURE — 83735 ASSAY OF MAGNESIUM: CPT | Performed by: INTERNAL MEDICINE

## 2024-06-03 RX ORDER — SODIUM CHLORIDE 9 MG/ML
75 INJECTION, SOLUTION INTRAVENOUS CONTINUOUS
OUTPATIENT
Start: 2024-06-03

## 2024-06-03 RX ORDER — SODIUM CHLORIDE 0.9 % (FLUSH) 0.9 %
10 SYRINGE (ML) INJECTION AS NEEDED
OUTPATIENT
Start: 2024-06-03

## 2024-06-03 RX ORDER — SODIUM CHLORIDE 0.9 % (FLUSH) 0.9 %
10 SYRINGE (ML) INJECTION EVERY 12 HOURS SCHEDULED
OUTPATIENT
Start: 2024-06-03

## 2024-06-03 RX ORDER — SODIUM CHLORIDE 9 MG/ML
40 INJECTION, SOLUTION INTRAVENOUS AS NEEDED
OUTPATIENT
Start: 2024-06-03

## 2024-06-03 RX ORDER — LOSARTAN POTASSIUM 50 MG/1
100 TABLET ORAL EVERY 24 HOURS
Status: DISCONTINUED | OUTPATIENT
Start: 2024-06-03 | End: 2024-06-04 | Stop reason: HOSPADM

## 2024-06-03 RX ADMIN — TOBRAMYCIN 300 MG: 300 SOLUTION RESPIRATORY (INHALATION) at 23:32

## 2024-06-03 RX ADMIN — Medication 10 ML: at 21:37

## 2024-06-03 RX ADMIN — LOSARTAN POTASSIUM 100 MG: 50 TABLET, FILM COATED ORAL at 13:34

## 2024-06-03 RX ADMIN — BUDESONIDE 0.5 MG: 0.5 SUSPENSION RESPIRATORY (INHALATION) at 20:06

## 2024-06-03 RX ADMIN — SEVELAMER CARBONATE 800 MG: 800 TABLET, FILM COATED ORAL at 13:34

## 2024-06-03 RX ADMIN — IPRATROPIUM BROMIDE AND ALBUTEROL SULFATE 3 ML: .5; 3 SOLUTION RESPIRATORY (INHALATION) at 11:49

## 2024-06-03 RX ADMIN — ACETAMINOPHEN 650 MG: 325 TABLET ORAL at 22:28

## 2024-06-03 RX ADMIN — ISOSORBIDE DINITRATE 40 MG: 20 TABLET ORAL at 13:34

## 2024-06-03 RX ADMIN — IPRATROPIUM BROMIDE AND ALBUTEROL SULFATE 3 ML: .5; 3 SOLUTION RESPIRATORY (INHALATION) at 20:06

## 2024-06-03 RX ADMIN — HEPARIN SODIUM 3400 UNITS: 1000 INJECTION INTRAVENOUS; SUBCUTANEOUS at 18:10

## 2024-06-03 RX ADMIN — CARVEDILOL 12.5 MG: 12.5 TABLET, FILM COATED ORAL at 08:31

## 2024-06-03 RX ADMIN — IPRATROPIUM BROMIDE AND ALBUTEROL SULFATE 3 ML: .5; 3 SOLUTION RESPIRATORY (INHALATION) at 23:32

## 2024-06-03 RX ADMIN — Medication 10 ML: at 08:31

## 2024-06-03 RX ADMIN — ISOSORBIDE DINITRATE 40 MG: 20 TABLET ORAL at 08:30

## 2024-06-03 RX ADMIN — PANTOPRAZOLE SODIUM 40 MG: 40 TABLET, DELAYED RELEASE ORAL at 08:31

## 2024-06-03 RX ADMIN — PREDNISONE 40 MG: 20 TABLET ORAL at 08:31

## 2024-06-03 RX ADMIN — ARFORMOTEROL TARTRATE 15 MCG: 15 SOLUTION RESPIRATORY (INHALATION) at 20:06

## 2024-06-03 RX ADMIN — ROSUVASTATIN 20 MG: 20 TABLET, FILM COATED ORAL at 08:31

## 2024-06-03 RX ADMIN — INSULIN GLARGINE 10 UNITS: 100 INJECTION, SOLUTION SUBCUTANEOUS at 08:31

## 2024-06-03 RX ADMIN — IPRATROPIUM BROMIDE AND ALBUTEROL SULFATE 3 ML: .5; 3 SOLUTION RESPIRATORY (INHALATION) at 03:56

## 2024-06-03 RX ADMIN — INSULIN LISPRO 6 UNITS: 100 INJECTION, SOLUTION INTRAVENOUS; SUBCUTANEOUS at 21:36

## 2024-06-03 RX ADMIN — SEVELAMER CARBONATE 800 MG: 800 TABLET, FILM COATED ORAL at 08:30

## 2024-06-03 RX ADMIN — ASPIRIN 81 MG: 81 TABLET, COATED ORAL at 08:31

## 2024-06-03 NOTE — NURSING NOTE
Duration of Treatment 3.5 Hours   Access Site Tunneled Dialysis Catheter   Dialyzer Revaclear    mL/min   Dialysate Temperature (C) 36   Use Crit-Line? Yes   BFR-As tolerated to a maximum of: 400 mL/min   Prime Dialyzer With NS to Priming Volume? Yes   Dialysate Solution Bath: K+ 2 mEq, Ca 2.5mEq   Bicarb 35 mEq   Na+ 137 meq   Fluid Removal: 3kg as tolerated     Patient tolerated treatment well. No issues. Removed 3L of fluid. Post /60.    Report given to primary RNFariba.

## 2024-06-03 NOTE — THERAPY EVALUATION
Patient Name: Charlette Rai  : 1935    MRN: [id number]                              Today's Date: 6/3/2024       Admit Date: 2024    Visit Dx:     ICD-10-CM ICD-9-CM   1. Pneumonia due to infectious organism, unspecified laterality, unspecified part of lung  J18.9 486   2. Hypoxia  R09.02 799.02   3. Acute respiratory failure with hypercapnia  J96.02 518.81   4. Decreased activities of daily living (ADL)  Z78.9 V49.89     Patient Active Problem List   Diagnosis    Malignant neoplasm of upper lobe of right lung    Rheumatoid arthritis    Asthma    Acute on chronic heart failure with preserved ejection fraction (HFpEF)    Essential hypertension    GERD (gastroesophageal reflux disease)    Hyperlipidemia LDL goal <70    Lumbar spinal stenosis    Migraine    Monoclonal paraproteinemia    Osteopenia    Oxygen dependent    Peripheral neuropathy    Stage 4 chronic kidney disease    Vitamin D deficiency    Carotid artery stenosis    Chronic right-sided thoracic back pain    Peripheral vascular disease of lower extremity    Ex-smoker    De Quervain's tenosynovitis    Arthritis of carpometacarpal (CMC) joint of right thumb    Steal syndrome of dialysis vascular access    Anemia of chronic renal failure, stage 4 (severe)    Aortic stenosis, moderate    Hematoma    ESRD (end stage renal disease)    Coronary artery calcification seen on CT scan    Frailty syndrome in geriatric patient    COPD with lower respiratory infection    Coagulation defect, unspecified    Hyperphosphatemia    Hypothyroidism, unspecified    Idiopathic osteoarthritis    Secondary multiple arthritis    Type 2 diabetes mellitus with diabetic peripheral angiopathy without gangrene    Acute on chronic respiratory failure with hypoxia and hypercapnia    Elevated troponin level not due myocardial infarction     Past Medical History:   Diagnosis Date    Allergic rhinitis     Anaphylactic shock, unspecified, initial encounter 2023    Anemia      Arthritis     Asthma     USE INHALERS AND NEBULIZERS    Back pain     Bladder disorder     Cancer     LEFT LUNG CANCER 2005 SURGERY AND CHEMO DONE  AND CURRENTLY 4/ 14/22  RIGHT LUNG CANCER HAS ONLY RECEIVED RADIATION THUS FAR    Chronic kidney disease     stage 3    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 DENIES CP BUT DOES HAVE SOA CHRONIC ISSUE COPD/LUNG CANCER    COPD (chronic obstructive pulmonary disease)     FOLLOWED BY DR MARIAJOSE BAILEY    Coronary artery disease     DENIES CP BUT DOES GET SOA MOST OF THE TIME WITH EXERTION BUT OCC AT REST CHRONIC ISSUE COPD/LUNG CANCER    Deep vein thrombosis     Diabetes mellitus     DOES NOT CHECK BS DAILY    Disease of thyroid gland     HYPOTHYROIDISM    Essential hypertension     Gastric ulcer     GERD (gastroesophageal reflux disease)     Heart murmur     History of transfusion     NO ISSUES POST TRANSFUSION WAS MANY YEARS AGO    Hyperlipemia     Leukocytopenia     FOLLOWED BY DR MIR BAILEY    Limb swelling     Lumbago     Lumbar spinal stenosis     Lung cancer     Migraine headache     Multiple joint pain     On home oxygen therapy     3L/NC PRN    Osteopenia     PNA (pneumonia) 05/04/2024    Pseudomonas pneumonia 04/29/2024    Reflux esophagitis     Shortness of breath     Thyroid nodule     HAS ULTRASOUND YEARLY BEING MONITORED    Vascular disease     Vitamin D deficiency      Past Surgical History:   Procedure Laterality Date    ABDOMINAL SURGERY      APPENDECTOMY      ARTERIOVENOUS FISTULA/SHUNT SURGERY Left 05/10/2022    Procedure: Left basilic vein transposition;  Surgeon: Wan Barksdale MD;  Location: [facility];  Service: Vascular;  Laterality: Left;    ARTERIOVENOUS FISTULA/SHUNT SURGERY Left 3/23/2023    Procedure: Ligation of left arm arteriovenous fistula;  Surgeon: Wan Barksdale MD;   "Location: [facility] MAIN OR;  Service: Vascular;  Laterality: Left;    ARTERIOVENOUS FISTULA/SHUNT SURGERY Left 11/30/2023    Procedure: Creation of left arm arteriovenous graft;  Surgeon: Wan Barksdale MD;  Location: [facility] MAIN OR;  Service: Vascular;  Laterality: Left;    BRONCHOSCOPY N/A 4/24/2024    Procedure: BRONCHOSCOPY WITH BAL AND WASHINGS;  Surgeon: Khalif Yanez MD;  Location: [facility] ENDOSCOPY;  Service: Pulmonary;  Laterality: N/A;  MUCUS PLUGGING    CARDIAC CATHETERIZATION  1996    CARDIAC SURGERY      CARDIAC SURGERY      fluid drained from heart    CATARACT EXTRACTION, BILATERAL  2003    COLONOSCOPY  2014    ENDOSCOPY  2016 2019    FEMORAL ARTERY STENT Bilateral     HYSTERECTOMY      LUNG BIOPSY Left 2005    lobectomy upper lung caner    LUNG VOLUME REDUCTION      OTHER SURGICAL HISTORY      artifical joints/limbs    REPLACEMENT TOTAL KNEE Left 2016    UPPER GASTROINTESTINAL ENDOSCOPY        General Information       Row Name 06/03/24 1104          OT Time and Intention    Document Type evaluation  -AV     Mode of Treatment individual therapy;occupational therapy  -AV       Row Name 06/03/24 1102          General Information    Patient Profile Reviewed yes  -AV     Prior Level of Function independent:;ADL's;transfer;all household mobility  stands at sink to groom. uses tub chair and elevated commode. ambulates with RW \"half and half\" of time, does not use device otherwise. 3L O2 at night and PRN during day.  -AV     Existing Precautions/Restrictions fall;oxygen therapy device and L/min  -AV     Barriers to Rehab none identified  -AV       Row Name 06/03/24 1107          Occupational Profile    Reason for Services/Referral (Occupational Profile) Patient is an 89 year old female admitted to [facility] on 5/20/24 with shortness of air. She is currently on 4th floor/ 1L O2 with sats 91-99%. Noted patient with previous hospitalizations: 4/14-4/17, 4/21-4/29, 5/1-5/7/24. OT consulted due to " recent decline in ADL/ transfer independence. No previous OT services for current condition.  -AV       Row Name 06/03/24 1104          Living Environment    People in Home spouse  -AV       Row Name 06/03/24 1104          Home Main Entrance    Number of Stairs, Main Entrance three  -AV       Row Name 06/03/24 1104          Stairs Within Home, Primary    Stairs, Within Home, Primary non-essential interior stairs  -AV       Row Name 06/03/24 1104          Cognition    Orientation Status (Cognition) --  alert, pleasant and cooperative. able to retain information and follow commands.  -AV       Row Name 06/03/24 1104          Safety Issues, Functional Mobility    Impairments Affecting Function (Mobility) balance;endurance/activity tolerance;strength  -AV               User Key  (r) = Recorded By, (t) = Taken By, (c) = Cosigned By      Initials Name Provider Type    Michael Carreon OT Occupational Therapist                     Mobility/ADL's       Row Name 06/03/24 1109          Bed Mobility    Bed Mobility supine-sit-supine  -AV     Supine-Sit-Supine [place] (Bed Mobility) independent  -AV     Assistive Device (Bed Mobility) bed rails  -AV       Row Name 06/03/24 1109          Transfers    Transfers sit-stand transfer  -AV       Row Name 06/03/24 1109          Sit-Stand Transfer    Sit-Stand [place] (Transfers) moderate assist (50% patient effort);verbal cues  -AV       Row Name 06/03/24 1109          Activities of Daily Living    BADL Assessment/Intervention --  Independent feeding and grooming with setup while seated. Min assist bathing/ dressing. Able to don socks (I) at bedside with some difficulty. CGA toilet hygiene using BSC.  -AV               User Key  (r) = Recorded By, (t) = Taken By, (c) = Cosigned By      Initials Name Provider Type    Michael Carreon OT Occupational Therapist                   Obj/Interventions       Row Name 06/03/24 1110          Sensory Assessment (Somatosensory)     "Sensory Assessment (Somatosensory) UE sensation intact  -AV     Sensory Assessment patient reports history of left hand numbness at times. sensory awareness to light touch intact at evaluation.  -AV       Row Name 06/03/24 1110          Vision Assessment/Intervention    Visual Impairment/Limitations WFL;corrective lenses full-time  -AV       Row Name 06/03/24 1110          Range of Motion Comprehensive    General Range of Motion bilateral upper extremity ROM WFL  -AV     Comment, General Range of Motion AROM  -AV       Row Name 06/03/24 1110          Strength Comprehensive (MMT)    Comment, General Manual Muscle Testing (MMT) Assessment 4(-)/5 bilateral biceps, triceps and   -AV       Row Name 06/03/24 1110          Motor Skills    Motor Skills coordination;functional endurance  -AV     Coordination WFL  right dominant  -AV     Functional Endurance fair minus. \"that wears me out\" (donning socks seated bedside)  -AV       Row Name 06/03/24 1110          Balance    Balance Assessment sitting dynamic balance;standing static balance  -AV     Dynamic Sitting Balance independent  -AV     Static Standing Balance moderate assist  -AV               User Key  (r) = Recorded By, (t) = Taken By, (c) = Cosigned By      Initials Name Provider Type    AV Michael Rodríguez OT Occupational Therapist                   Goals/Plan       Row Name 06/03/24 1114          Transfer Goal 1 (OT)    Activity/Assistive Device (Transfer Goal 1, OT) transfers, all;walker, rolling  -AV     [place] Level/Cues Needed (Transfer Goal 1, OT) modified independence  -AV     Time Frame (Transfer Goal 1, OT) long term goal (LTG);10 days  -AV       Row Name 06/03/24 1114          Bathing Goal 1 (OT)    Activity/Device (Bathing Goal 1, OT) bathing skills, all;tub bench  -AV     [place] Level/Cues Needed (Bathing Goal 1, OT) modified independence  -AV     Time Frame (Bathing Goal 1, OT) long term goal (LTG);10 days  -AV       Row Name 06/03/24 " 1114          Dressing Goal 1 (OT)    Activity/Device (Dressing Goal 1, OT) dressing skills, all  -AV     [place]/Cues Needed (Dressing Goal 1, OT) modified independence  -AV     Time Frame (Dressing Goal 1, OT) long term goal (LTG);10 days  -AV       Row Name 06/03/24 1114          Toileting Goal 1 (OT)    Activity/Device (Toileting Goal 1, OT) toileting skills, all;raised toilet seat  -AV     [place] Level/Cues Needed (Toileting Goal 1, OT) modified independence  -AV     Time Frame (Toileting Goal 1, OT) long term goal (LTG);10 days  -AV       Row Name 06/03/24 1114          Grooming Goal 1 (OT)    Activity/Device (Grooming Goal 1, OT) grooming skills, all  -AV     [place] (Grooming Goal 1, OT) modified independence  standing at sink  -AV     Time Frame (Grooming Goal 1, OT) long term goal (LTG)  -AV       Row Name 06/03/24 1114          Strength Goal 1 (OT)    Strength Goal 1 (OT) Patient will demonstrate 4/5 bilateral biceps. triceps and  to increase ADL/ transfer independence.  -AV     Time Frame (Strength Goal 1, OT) long term goal (LTG);10 days  -AV       Row Name 06/03/24 1114          Problem Specific Goal 1 (OT)    Problem Specific Goal 1 (OT) Patient will demonstrate fair endurance/ activity tolerance needed to support ADLs.  -AV     Time Frame (Problem Specific Goal 1, OT) long term goal (LTG);10 days  -AV       Row Name 06/03/24 1114          Therapy Assessment/Plan (OT)    Planned Therapy Interventions (OT) activity tolerance training;BADL retraining;functional balance retraining;occupation/activity based interventions;patient/caregiver education/training;transfer/mobility retraining;strengthening exercise  -AV               User Key  (r) = Recorded By, (t) = Taken By, (c) = Cosigned By      Initials Name Provider Type    Michael Carreon OT Occupational Therapist                   Clinical Impression       Row Name 06/03/24 1112          Pain Assessment    Additional  Documentation Pain Scale: FACES Pre/Post-Treatment (Group)  -AV       Row Name 06/03/24 1112          Pain Scale: FACES Pre/Post-Treatment    Pain: FACES Scale, Pretreatment 0-->no hurt  -AV     Posttreatment Pain Rating 0-->no hurt  -AV       Row Name 06/03/24 1112          Plan of Care Review    Plan of Care Reviewed With patient  -AV     Progress no change  first session: evaluation  -AV     Outcome Evaluation Patient presents with limitations of balance, strength and endurance/ activity tolerance which are impacting ADL/ transfer independence. Skilled OT services are indicated to remediate/ compensate for deficits to maximize independence and safety with functional tasks.  -AV       Row Name 06/03/24 1112          Therapy Assessment/Plan (OT)    Patient/Family Therapy Goal Statement (OT) regain independence  -AV     Rehab Potential (OT) good, to achieve stated therapy goals  -AV     Criteria for Skilled Therapeutic Interventions Met (OT) yes;meets criteria;skilled treatment is necessary  -AV     Therapy Frequency (OT) 5 times/wk  -AV       Row Name 06/03/24 1112          Therapy Plan Review/Discharge Plan (OT)    Equipment Needs Upon Discharge (OT) tub bench  -AV     Anticipated Discharge Disposition (OT) sub acute care setting  -AV       Row Name 06/03/24 1112          Vital Signs    O2 Delivery Pre Treatment nasal cannula  1  -AV     O2 Delivery Intra Treatment nasal cannula  1  -AV     O2 Delivery Post Treatment nasal cannula  1  -AV       Row Name 06/03/24 1112          Positioning and Restraints    Pre-Treatment Position in bed  -AV     Post Treatment Position bed  -AV     In Bed call light within reach;encouraged to call for assist  -AV               User Key  (r) = Recorded By, (t) = Taken By, (c) = Cosigned By      Initials Name Provider Type    Michael Carreon, OT Occupational Therapist                   Outcome Measures       Row Name 06/03/24 1116          How much help from another is currently  needed...    Putting on and taking off regular lower body clothing? 3  -AV     Bathing (including washing, rinsing, and drying) 3  -AV     Toileting (which includes using toilet bed pan or urinal) 3  -AV     Putting on and taking off regular upper body clothing 4  -AV     Taking care of personal grooming (such as brushing teeth) 4  -AV     Eating meals 4  -AV     AM-PAC 6 Clicks Score (OT) 21  -AV       Row Name 06/03/24 0831          How much help from another person do you currently need...    Turning from your back to your side while in flat bed without using bedrails? 4  -AG     Moving from lying on back to sitting on the side of a flat bed without bedrails? 4  -AG     Moving to and from a bed to a chair (including a wheelchair)? 3  -AG     Standing up from a chair using your arms (e.g., wheelchair, bedside chair)? 2  -AG     Climbing 3-5 steps with a railing? 2  -AG     To walk in hospital room? 2  -AG     AM-PAC 6 Clicks Score (PT) 17  -AG     Highest Level of Mobility Goal 5 --> Static standing  -AG       Row Name 06/03/24 1116          Functional Assessment    Outcome Measure Options AM-PAC 6 Clicks Daily Activity (OT);Optimal Instrument  -AV       Row Name 06/03/24 1116          Optimal Instrument    Optimal Instrument Optimal - 3  -AV     Bending/Stooping 2  -AV     Standing 3  -AV     Reaching 1  -AV     From the list, choose the 3 activities you would most like to be able to do without any difficulty Bending/stooping;Standing;Reaching  -AV     Total Score Optimal - 3 6  -AV               User Key  (r) = Recorded By, (t) = Taken By, (c) = Cosigned By      Initials Name Provider Type    Michael Carreon OT Occupational Therapist    AG Fariba Lucas, RN Registered Nurse                    Occupational Therapy Education       Title: PT OT SLP Therapies (Done)       Topic: Occupational Therapy (Done)       Point: ADL training (Done)       Description:   Instruct learner(s) on proper safety adaptation and  remediation techniques during self care or transfers.   Instruct in proper use of assistive devices.                  Learning Progress Summary             Patient Acceptance, E, VU by AV at 6/3/2024 1117                         Point: Home exercise program (Done)       Description:   Instruct learner(s) on appropriate technique for monitoring, assisting and/or progressing therapeutic exercises/activities.                  Learning Progress Summary             Patient Acceptance, E, VU by AV at 6/3/2024 1117                         Point: Precautions (Done)       Description:   Instruct learner(s) on prescribed precautions during self-care and functional transfers.                  Learning Progress Summary             Patient Acceptance, E, VU by AV at 6/3/2024 1117                         Point: Body mechanics (Done)       Description:   Instruct learner(s) on proper positioning and spine alignment during self-care, functional mobility activities and/or exercises.                  Learning Progress Summary             Patient Acceptance, E, VU by AV at 6/3/2024 1117                                         User Key       Initials Effective Dates Name Provider Type Discipline     06/16/21 -  Michael Rodríguez OT Occupational Therapist OT                  OT Recommendation and Plan  Planned Therapy Interventions (OT): activity tolerance training, BADL retraining, functional balance retraining, occupation/activity based interventions, patient/caregiver education/training, transfer/mobility retraining, strengthening exercise  Therapy Frequency (OT): 5 times/wk  Plan of Care Review  Plan of Care Reviewed With: patient  Progress: no change (first session: evaluation)  Outcome Evaluation: Patient presents with limitations of balance, strength and endurance/ activity tolerance which are impacting ADL/ transfer independence. Skilled OT services are indicated to remediate/ compensate for deficits to maximize independence and  safety with functional tasks.     Time Calculation:   Evaluation Complexity (OT)  Review Occupational Profile/Medical/Therapy History Complexity: expanded/moderate complexity  Assessment, Occupational Performance/Identification of Deficit Complexity: 3-5 performance deficits  Clinical Decision Making Complexity (OT): detailed assessment/moderate complexity  Overall Complexity of Evaluation (OT): moderate complexity     Time Calculation- OT       Row Name 06/03/24 1119             Time Calculation- OT    OT Received On 06/03/24  -AV      OT Goal Re-Cert Due Date 06/12/24  -AV         Untimed Charges    OT Eval/Re-eval Minutes 35  -AV         Total Minutes    Untimed Charges Total Minutes 35  -AV       Total Minutes 35  -AV                User Key  (r) = Recorded By, (t) = Taken By, (c) = Cosigned By      Initials Name Provider Type    AV Michael Rodríguez OT Occupational Therapist                  Therapy Charges for Today       Code Description Service Date Service Provider Modifiers Qty    [id number] HC OT EVAL MOD COMPLEXITY 3 6/3/2024 Michael Rodríguez OT GO 1                 Michael Rodríguez OT  6/3/2024

## 2024-06-03 NOTE — PROGRESS NOTES
[facility]     Nephrology Progress Note      Patient Name: Charlette Rai  : 1935  MRN: [id number]  Primary Care Physician:  Rafael Lyons MD  Date of admission: 2024    Subjective   Subjective     Interval History:  Patient Reports feeling better.  On O2 per nasal cannula this morning.  Less short of breath, still with intermittent cough.   seen in dialysis.      Review of Systems   All systems were reviewed and negative except for: What is mentioned above.    Objective   Objective     Vitals:   Temp:  [97.3 °F (36.3 °C)-98.1 °F (36.7 °C)] 98.1 °F (36.7 °C)  Heart Rate:  [66-85] 66  Resp:  [16-20] 18  BP: (137-183)/(40-58) 159/55  Flow (L/min):  [1-3] 3  Physical Exam:   Constitutional: Awake, alert, no distress, mild conversational dyspnea   Eyes: sclerae anicteric, no conjunctival injection   HENT: mucous membranes moist   Neck: Supple, no thyromegaly, no lymphadenopathy, trachea midline, + JVD   Respiratory: Decreased to auscultation bilaterally with scattered rhonchi, nonlabored respirations    Cardiovascular: RRR, no murmurs, rubs, or gallops.   Gastrointestinal: Positive bowel sounds, soft, nontender, nondistended   Musculoskeletal: Trace edema, no clubbing or cyanosis, left upper extremity AV graft patent, right IJ TDC in place.   Psychiatric: Appropriate affect, cooperative   Neurologic: Oriented x 3, moving all extremities, Cranial Nerves grossly intact, speech clear   Skin: warm and dry, no rashes..    Result Review    Result Reviewed:  I have personally reviewed the results from the time of this admission to 6/3/2024 18:05 EDT and agree with these findings:  [x]  Laboratory  []  Microbiology  [x]  Radiology  []  EKG/Telemetry   []  Cardiology/Vascular   []  Pathology  []  Old records  []  Other:        Lab 24  0323 24  0557 24  0505 24  0620 24  0729 24  0559   SODIUM 134* 134* 130* 133*  --  136   SODIUM, ARTERIAL  --   --   --   --  134.9*  --     POTASSIUM 3.8 4.2 5.3* 5.4*  --  4.3   CHLORIDE 97* 98 94* 95*  --  96*   CO2 26.4 26.2 25.4 24.9  --  35.2*   BUN 52* 37* 54* 30*  --  23   CREATININE 3.32* 2.56* 3.02* 2.44*  --  2.04*   GLUCOSE 151* 147* 191* 276*  --  268*   GLUCOSE, ARTERIAL  --   --   --   --  213*  --    EGFR 12.9* 17.7* 14.5* 18.7*  --  23.2*   ANION GAP 10.6 9.8 10.6 13.1  --  4.8*   MAGNESIUM 2.2 2.0 2.2  --   --   --    PHOSPHORUS 3.7 3.7 4.4  --   --   --            Most notable findings include: Reviewed labs.  Potassium 3.8    Assessment & Plan   Assessment / Plan       Active Hospital Problems:  Active Hospital Problems    Diagnosis     **Acute on chronic respiratory failure with hypoxia and hypercapnia     Elevated troponin level not due myocardial infarction     Aortic stenosis, moderate     Acute on chronic heart failure with preserved ejection fraction (HFpEF)        Assessment and Plan:    - ESRD, dialysis MWF through right IJ TDC, [place], [place].  Here with acute respiratory failure I suspect she has flash pulmonary edema possibly related to renal artery stenosis.  Discussed with vascular, will need left renal artery stenting but can be done as outpatient.  Patient would rather not use her AV graft until she is feeling better.  Using TDC today.  - Severe hypertension, acute on chronic, better.      - Possible pneumonia right lower quadrant, on antibiotics, per primary.  - Anemia of chronic kidney disease, hemoglobin at goal.  - Severe peripheral vascular disease.  - Diastolic dysfunction with LVH, EF 55%.    - Type 2 diabetes with complications.  - Mild hyperkalemia, improved, continue low potassium diet.    From renal standpoint, patient can be discharged after dialysis today.  Vascular will set her up for outpatient left renal artery stenting.  Will start using her AV graft as outpatient.  Please call with any questions.  Discussed with dialysis nurse.  Will follow.      Electronically signed by Edi García  MD, 6/3/2024, 18:05 EDT.

## 2024-06-03 NOTE — PLAN OF CARE
Goal Outcome Evaluation:  Plan of Care Reviewed With: patient        Progress: improving  Outcome Evaluation: Patient stated she did not want to use bipap tonight. Patient is resting on 1L nasal cannula.

## 2024-06-03 NOTE — PLAN OF CARE
Goal Outcome Evaluation:      Patient alert and oriented x4. Patient on 2L nasal canula, tolerating well with no signs of distress. Patient is normal sinus rhythm on the monitor. Patient currently in dialysis. Will continue to monitor and follow the patients plan of care.   .Fariba Lucas RN

## 2024-06-03 NOTE — PROGRESS NOTES
[facility]   Hospitalist Progress Note  Date: 6/3/2024  Patient Name: Charlette Rai  : 1935  MRN: [id number]  Date of admission: 2024  Consultants:   -Cardiology: Dr. León Sancehz, Dr. Justus Wills  -Vascular surgery: Dr. Dio Miguel  -Pulmonology: Dr. Khalif Yanez, Dr. Rosalinda Bocanegra  -Nephrology: Dr. dEi García    Subjective   Subjective     Chief Complaint: Shortness of breath    Summary:   Charlette Rai is a 89 y.o. female with left lung cancer s/p resection and chemo, right lung cancer s/p radiation, COPD, CAD, ESRD on hemodialysis, essential hypertension who had recent admission to the hospital for Pseudomonas pneumonia and had discharged to rehab and subsequently discharged home from rehab for only a couple of days before she experienced a severe shortness of breath prompting her to come to ED for evaluation.  Patient significantly hypertensive and hypoxic.  Patient placed on BiPAP.  CXR showed right lung opacities.  Patient started on broad-spectrum empiric antibiotics.  Troponin was noted to be elevated higher than previous values and proBNP elevated as well, patient started on heparin drip.  Pulmonology, cardiology, vascular surgery and nephrology consulted.  Echocardiogram obtained, normal EF and no wall motion abnormalities noted, moderate LVH and moderate valvular disease.  CT imaging obtained and patient found to have high-grade left renal stenosis, severe SMA stenosis, severe right common femoral artery calcific disease.  Vascular plans to set patient up for outpatient left renal artery stenting.    Interval Followup:   No acute issues overnight.  Patient denies any shortness of breath or chest pain.  Nursing with no additional acute issues to report.    Objective   Objective     Vitals:   Temp:  [97.3 °F (36.3 °C)-98.1 °F (36.7 °C)] 97.3 °F (36.3 °C)  Heart Rate:  [73-85] 80  Resp:  [16-20] 18  BP: (137-183)/(40-58) 162/40  Flow (L/min):  [1-3] 3  Physical Exam   Gen: No acute distress,  conversant, pleasant, sitting up in bed  Resp: Improved aeration, equal chest rise bilaterally, no wheezing/rales/rhonchi noted  Card: RRR, No m/r/g  Abd: Soft, Nontender, Nondistended, + bowel sounds    Result Review    Result Review:  I have personally reviewed the results as below and agree with these findings:  []  Laboratory:   CMP          6/1/2024    05:05 6/2/2024    05:57 6/3/2024    03:23   CMP   Glucose 191  147  151    BUN 54  37  52    Creatinine 3.02  2.56  3.32    EGFR 14.5  17.7  12.9    Sodium 130  134  134    Potassium 5.3  4.2  3.8    Chloride 94  98  97    Calcium 8.2  8.0  7.8    BUN/Creatinine Ratio 17.9  14.5  15.7    Anion Gap 10.6  9.8  10.6      CBC          6/1/2024    05:05 6/2/2024    05:57 6/3/2024    03:23   CBC   WBC 8.50  8.06  7.95    RBC 2.94  2.97  2.87    Hemoglobin 8.8  9.0  8.7    Hematocrit 29.7  29.8  28.4    .0  100.3  99.0    MCH 29.9  30.3  30.3    MCHC 29.6  30.2  30.6    RDW 14.8  14.7  14.7    Platelets 143  139  138    Phosphorus and magnesium within normal limits  [x]  Microbiology: Blood culture (05/30/2024): No growth to date  []  Radiology:   [x]  EKG/Telemetry:    []  Cardiology/Vascular:    []  Pathology:  []  Old records:  []  Other:    Assessment & Plan   Assessment / Plan     Assessment:  Acute COPD exacerbation  Recurrent right lower lobe pneumonia  Recent history of Pseudomonas pneumonia  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  -Continue supplemental O2 to maintain sats greater than 90%, wean as  tolerated  -Continue Brovana, Pulmicort and DuoNebs  -Continue JUAN nebs  -Hemodialysis per nephrology  -Continue prednisone 40 mg daily for total of 5 days (Day 2/5)  -Continue carvedilol, isosorbide dinitrate, losartan  -No changes in regimen  -Continue appropriate home medications  -Monitor electrolytes and renal function with BMP and magnesium level in the AM  -Monitor WBC and Hgb with CBC in the AM  -Clinical course will dictate further management     DVT Prophylaxis: SCDs  GI Prophylaxis: Pantoprazole  Diet:   Diet Order   Procedures    Diet: Regular/House, Renal, Fluid Restriction (240 mL/tray); Low Sodium (2-3g), Low Potassium; 1500 mL/day; Fluid Consistency: Thin (IDDSI 0)     Dispo: Home with home health.  Patient does not wish to pursue rehab placement.     Personally reviewed patients labs and imaging, discussed with patient and nurse at bedside. Discussed management with the following consultants: Cardiology, Vascular Surgery, Pulmonology and Nephrology.     Part of this note may be an electronic transcription/translation of spoken language to printed text using the Dragon dictation system.    DVT prophylaxis:  Medical and mechanical DVT prophylaxis orders are present.        CODE STATUS:   Code Status (Patient has no pulse and is not breathing): CPR (Attempt to Resuscitate)  Medical Interventions (Patient has pulse or is breathing): Full Support  Comments: DNI        Electronically signed by Pieter Salgado MD, 6/3/2024, 12:54 EDT.

## 2024-06-03 NOTE — CASE MANAGEMENT/SOCIAL WORK
Patient confirms she already has Tobramycin nebulizer medication at home, provided to her by [facility]. See home med list.

## 2024-06-03 NOTE — PROGRESS NOTES
Pulmonary / Critical Care Progress Note      Patient Name: Charlette Rai  : 1935  MRN: [id number]  Primary Care Physician:  Rafael Lyons MD  Date of admission: 2024    Subjective   Subjective   Follow-up for volume overload and pneumonia    No acute events overnight.     This morning,   Sitting up on side of bed  Overall feeling better  Remains on 1 L nasal cannula  Dyspnea improved  Denies cough, chest pain or fever  Scheduled for dialysis today  Eager to discharge home    Objective   Objective     Vitals:   Temp:  [97.3 °F (36.3 °C)-98.2 °F (36.8 °C)] 97.3 °F (36.3 °C)  Heart Rate:  [73-85] 83  Resp:  [17-20] 20  BP: (137-183)/(40-58) 183/58  Flow (L/min):  [1-2] 1    Physical Exam   Vital Signs Reviewed   General: Frail appearing, elderly female, alert, NAD, sitting up on side of bed  Chest: Increased aeration, faint expiratory wheeze bilaterally, no work of breathing noted  CV: Regular rhythm and rate  EXT: No edema  Neuro:  A&Ox3, moving all extremities x 4 spontaneously  Skin: No rashes or lesions noted    Result Review    Result Review:  I have personally reviewed the results from the time of this admission to 6/3/2024 10:41 EDT and agree with these findings:  [x]  Laboratory  [x]  Microbiology  [x]  Radiology  [x]  EKG/Telemetry   []  Cardiology/Vascular   []  Pathology  []  Old records  []  Other:  Most notable findings include:     -Echocardiogram with EF of 55%, grade 1 diastolic dysfunction, moderate aortic valve stenosis with moderately elevated RVSP    Assessment & Plan   Assessment / Plan     Active Hospital Problems:  Active Hospital Problems    Diagnosis     **Acute on chronic respiratory failure with hypoxia and hypercapnia     Elevated troponin level not due myocardial infarction     Aortic stenosis, moderate     Acute on chronic heart failure with preserved ejection fraction (HFpEF)      Impression:   Recurrent pneumonia, right lower lobe pneumonia  Recent history of Pseudomonas  pneumonia  Possible hypertensive urgency  Volume overload  End-stage renal disease on dialysis  COPD with acute exacerbation  History of lung cancer status post surgery and radiation in past    Plan:   -Continue O2 to maintain SpO2 greater than 90%  -Continue NIPPV at night and as needed days with naps  -CXR with improved infiltrates likely secondary to pulmonary edema  -Nephrology on board.  Appreciate assistance. Dialysis timing per nephrology.  -Continue prednisone 40 mg daily to complete 5 to 7-day course  -Continue Brovana, Pulmicort and DuoNebs  -Continue JUAN nebs with home cycle of 30 days on/30 days off.  -RT  consulted.  Appreciate assistance.  Home JUAN nebs already arranged.  -Continue BPH.  Encourage use of I-S and flutter valve.  -Vascular surgery consulted for renal artery stenosis.  Appreciate assistance.  To follow-up further as outpatient.  -Encourage mobilization.  Out of bed to chair.  -Follow-up with pulmonology 1 to 2 weeks after discharge  -Okay to discharge home from pulmonary standpoint    Unless otherwise needed, pulmonary will sign off at this time. Please call with any questions or concerns.    DVT prophylaxis:  Medical and mechanical DVT prophylaxis orders are present.        CODE STATUS:   Code Status (Patient has no pulse and is not breathing): CPR (Attempt to Resuscitate)  Medical Interventions (Patient has pulse or is breathing): Full Support  Comments: DNI    Electronically signed by Regis Holcomb DO, 6/3/2024, 10:41 EDT.    Electronically signed by DAT Carballo, 06/03/24, 11:14 AM EDT.    This visit was performed by BOTH a physician and an APC. I personally evaluated and examined the patient.  And spent more than 51% of time caring for this patient I performed all aspects of MDM as documented. , I have reviewed and confirmed the accuracy of the patient's history as documented in this note. and I have reexamined the patient and the results are  consistent with the previously documented exam. I have updated the documentation as necessary    Electronically signed by Regis Holcomb DO, 06/03/24, 6:28 PM EDT.

## 2024-06-03 NOTE — PROGRESS NOTES
RT EQUIPMENT DEVICE RELATED - SKIN ASSESSMENT    RT Medical Equipment/Device:     NIV Mask:  Full-face    size: s    Skin Assessment:      Cheek:  Intact  Nose:  Intact  Lips:  Intact    Device Skin Pressure Protection:  Pressure points protected    Nurse Notification:  Gladis Silverman, RRT

## 2024-06-03 NOTE — PLAN OF CARE
Goal Outcome Evaluation:  Plan of Care Reviewed With: patient        Progress: no change (first session: evaluation)  Outcome Evaluation: Patient presents with limitations of balance, strength and endurance/ activity tolerance which are impacting ADL/ transfer independence. Skilled OT services are indicated to remediate/ compensate for deficits to maximize independence and safety with functional tasks.      Anticipated Discharge Disposition (OT): sub acute care setting

## 2024-06-03 NOTE — PROGRESS NOTES
Walking Oximetry Progress Note      Patient Name:  Charlette Rai  YOB: 1935  Date of Procedure: 06/03/24              ROOM AIR BASELINE   SpO2% 96   Heart Rate 82     EXERCISE ON ROOM AIR SpO2% EXERCISE ON O2 LPM SpO2%   1 MINUTE 92 1 MINUTE 2 85   2 MINUTES 85 2 MINUTES 2 87   3 MINUTES  3 MINUTES 3 91   4 MINUTES  4 MINUTES 3 88   5 MINUTES  5 MINUTES 4 89   6 MINUTES  6 MINUTES 4 91              Time to Recovery  6 minutes   SpO2% Post Exercise  91 on 4L.    HR Post Exercise  90     Comments: patient stood up and immediately her sats starting dropping with movement and exertion. She did require 2 small breaks to catch her breath throughout the 6 minute walk. Patient required 4L to keep sats greater than 88%          Electronically signed by Natasha Contreras RRT, 06/03/24, 11:43 AM EDT.

## 2024-06-03 NOTE — PROGRESS NOTES
CARDIOLOGY  INPATIENT PROGRESS NOTE                [facility] 4TH FLOOR MEDICAL TELEMETRY UNIT    6/3/2024      PATIENT IDENTIFICATION:   Name:  Charlette Rai      MRN:  [id number]     89 y.o.  female             Chief Complaint   Patient presents with    Shortness of Breath        SUBJECTIVE:   Patient with no issues or complaints this morning still with some dyspnea exertion issues  OBJECTIVE:  Vitals:    06/02/24 2006 06/02/24 2230 06/02/24 2321 06/03/24 0357   BP: 137/40 142/50 142/57    BP Location: Right arm  Right arm    Patient Position: Lying  Lying    Pulse: 79 79 83 81   Resp: 20  18 20   Temp: 98.1 °F (36.7 °C) 97.7 °F (36.5 °C) 98.1 °F (36.7 °C)    TempSrc: Oral  Oral    SpO2: 100% 94% 93% 94%   Weight:       Height:               Body mass index is 24.36 kg/m².    Intake/Output Summary (Last 24 hours) at 6/3/2024 0848  Last data filed at 6/3/2024 0600  Gross per 24 hour   Intake 1380 ml   Output --   Net 1380 ml       Telemetry: Normal sinus rhythm    Physical Exam  General Appearance:   no acute distress  Alert and oriented x3  HENT:   lips not cyanotic  Atraumatic  Neck:  No jvd   supple  Respiratory:  no respiratory distress  normal breath sounds  no rales  Cardiovascular:    regular rhythm  no S3, no S4   no 2/6 systolic mid peaking no rub  lower extremity edema: none    Skin:   warm, dry  No rashes      No Known Allergies  Scheduled meds:  arformoterol, 15 mcg, Nebulization, BID - RT  aspirin, 81 mg, Oral, Daily  budesonide, 0.5 mg, Nebulization, BID - RT  carvedilol, 12.5 mg, Oral, BID With Meals  insulin glargine, 10 Units, Subcutaneous, Daily  insulin lispro, 2-7 Units, Subcutaneous, 4x Daily AC & at Bedtime  ipratropium-albuterol, 3 mL, Nebulization, Q4H  isosorbide dinitrate, 40 mg, Oral, TID - Nitrates  losartan, 50 mg, Oral, Q24H  pantoprazole, 40 mg, Oral, BID AC  predniSONE, 40 mg, Oral, Daily With Breakfast  rosuvastatin, 20 mg, Oral, Daily  sevelamer, 800 mg, Oral, TID  "With Meals  sodium chloride, 10 mL, Intravenous, Q12H  tobramycin PF, 300 mg, Nebulization, BID - RT      IV meds:                         Data Review:  CBC          6/1/2024    05:05 6/2/2024    05:57 6/3/2024    03:23   CBC   WBC 8.50  8.06  7.95    RBC 2.94  2.97  2.87    Hemoglobin 8.8  9.0  8.7    Hematocrit 29.7  29.8  28.4    .0  100.3  99.0    MCH 29.9  30.3  30.3    MCHC 29.6  30.2  30.6    RDW 14.8  14.7  14.7    Platelets 143  139  138      CMP          6/1/2024    05:05 6/2/2024    05:57 6/3/2024    03:23   CMP   Glucose 191  147  151    BUN 54  37  52    Creatinine 3.02  2.56  3.32    EGFR 14.5  17.7  12.9    Sodium 130  134  134    Potassium 5.3  4.2  3.8    Chloride 94  98  97    Calcium 8.2  8.0  7.8    BUN/Creatinine Ratio 17.9  14.5  15.7    Anion Gap 10.6  9.8  10.6       CARDIAC LABS:      Lab 05/31/24  0922 05/31/24  0620 05/30/24  1229 05/30/24  0811 05/30/24  0559   PROBNP  --   --   --   --  18,671.0*   HSTROP T 153* 151* 163* 108* 66*   PROTIME  --   --  13.6  --  13.9   INR  --   --  1.02  --  1.05        No results found for: \"DIGOXIN\"   Lab Results   Component Value Date    TSH 3.630 05/01/2024           Invalid input(s): \"LDLCALC\"  No results found for: \"POCTROP\"  Lab Results   Component Value Date    TROPONINT 153 (C) 05/31/2024   (  Lab Results   Component Value Date    MG 2.2 06/03/2024     Results for orders placed during the hospital encounter of 05/30/24    Adult Transthoracic Echo Complete W/ Cont if Necessary Per Protocol    Interpretation Summary    Left ventricular systolic function is normal. Estimated left ventricular EF = 55%    Left ventricular wall thickness is consistent with mild to moderate concentric hypertrophy. Sigmoid-shaped ventricular septum is present.    Left ventricular diastolic function is consistent with (grade Ia w/high LAP) impaired relaxation.    The left atrial cavity is mildly dilated.    Moderate aortic valve stenosis is present.    Moderate " mitral valve stenosis is present.    Estimated right ventricular systolic pressure from tricuspid regurgitation is moderately elevated (45-55 mmHg).           ASSESSMENT:    Acute on chronic respiratory failure with hypoxia and hypercapnia    Acute on chronic heart failure with preserved ejection fraction (HFpEF)    Aortic stenosis, moderate    Elevated troponin level not due myocardial infarction        PLAN:  1.  Continue with current treatment with increasing losartan to 100 mg a day  2.  Hemodialysis for volume maintenance          León Sanchez MD  6/3/2024    08:48 EDT

## 2024-06-03 NOTE — PLAN OF CARE
Goal Outcome Evaluation:            VSS denies pain, will continue plan of care

## 2024-06-03 NOTE — THERAPY EVALUATION
Acute Care - Physical Therapy Initial Evaluation  [facility]     Patient Name: Charlette Rai  : 1935  MRN: [id number]  Today's Date: 6/3/2024      Visit Dx:     ICD-10-CM ICD-9-CM   1. Pneumonia due to infectious organism, unspecified laterality, unspecified part of lung  J18.9 486   2. Hypoxia  R09.02 799.02   3. Acute respiratory failure with hypercapnia  J96.02 518.81   4. Decreased activities of daily living (ADL)  Z78.9 V49.89     Patient Active Problem List   Diagnosis    Malignant neoplasm of upper lobe of right lung    Rheumatoid arthritis    Asthma    Acute on chronic heart failure with preserved ejection fraction (HFpEF)    Essential hypertension    GERD (gastroesophageal reflux disease)    Hyperlipidemia LDL goal <70    Lumbar spinal stenosis    Migraine    Monoclonal paraproteinemia    Osteopenia    Oxygen dependent    Peripheral neuropathy    Stage 4 chronic kidney disease    Vitamin D deficiency    Carotid artery stenosis    Chronic right-sided thoracic back pain    Peripheral vascular disease of lower extremity    Ex-smoker    De Quervain's tenosynovitis    Arthritis of carpometacarpal (CMC) joint of right thumb    Steal syndrome of dialysis vascular access    Anemia of chronic renal failure, stage 4 (severe)    Aortic stenosis, moderate    Hematoma    ESRD (end stage renal disease)    Coronary artery calcification seen on CT scan    Frailty syndrome in geriatric patient    COPD with lower respiratory infection    Coagulation defect, unspecified    Hyperphosphatemia    Hypothyroidism, unspecified    Idiopathic osteoarthritis    Secondary multiple arthritis    Type 2 diabetes mellitus with diabetic peripheral angiopathy without gangrene    Acute on chronic respiratory failure with hypoxia and hypercapnia    Elevated troponin level not due myocardial infarction     Past Medical History:   Diagnosis Date    Allergic rhinitis     Anaphylactic shock, unspecified, initial encounter 2023     Anemia     Arthritis     Asthma     USE INHALERS AND NEBULIZERS    Back pain     Bladder disorder     Cancer     LEFT LUNG CANCER 2005 SURGERY AND CHEMO DONE  AND CURRENTLY 4/ 14/22  RIGHT LUNG CANCER HAS ONLY RECEIVED RADIATION THUS FAR    Chronic kidney disease     stage 3    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 DENIES CP BUT DOES HAVE SOA CHRONIC ISSUE COPD/LUNG CANCER    COPD (chronic obstructive pulmonary disease)     FOLLOWED BY DR MARIAJOSE BAILEY    Coronary artery disease     DENIES CP BUT DOES GET SOA MOST OF THE TIME WITH EXERTION BUT OCC AT REST CHRONIC ISSUE COPD/LUNG CANCER    Deep vein thrombosis     Diabetes mellitus     DOES NOT CHECK BS DAILY    Disease of thyroid gland     HYPOTHYROIDISM    Essential hypertension     Gastric ulcer     GERD (gastroesophageal reflux disease)     Heart murmur     History of transfusion     NO ISSUES POST TRANSFUSION WAS MANY YEARS AGO    Hyperlipemia     Leukocytopenia     FOLLOWED BY DR MIR BAILEY    Limb swelling     Lumbago     Lumbar spinal stenosis     Lung cancer     Migraine headache     Multiple joint pain     On home oxygen therapy     3L/NC PRN    Osteopenia     PNA (pneumonia) 05/04/2024    Pseudomonas pneumonia 04/29/2024    Reflux esophagitis     Shortness of breath     Thyroid nodule     HAS ULTRASOUND YEARLY BEING MONITORED    Vascular disease     Vitamin D deficiency      Past Surgical History:   Procedure Laterality Date    ABDOMINAL SURGERY      APPENDECTOMY      ARTERIOVENOUS FISTULA/SHUNT SURGERY Left 05/10/2022    Procedure: Left basilic vein transposition;  Surgeon: Wan Barksdale MD;  Location: [facility];  Service: Vascular;  Laterality: Left;    ARTERIOVENOUS FISTULA/SHUNT SURGERY Left 3/23/2023    Procedure: Ligation of left arm arteriovenous fistula;  Surgeon: Wan Barksdale  MD;  Location: [facility] MAIN OR;  Service: Vascular;  Laterality: Left;    ARTERIOVENOUS FISTULA/SHUNT SURGERY Left 11/30/2023    Procedure: Creation of left arm arteriovenous graft;  Surgeon: Wan Barksdale MD;  Location: [facility] MAIN OR;  Service: Vascular;  Laterality: Left;    BRONCHOSCOPY N/A 4/24/2024    Procedure: BRONCHOSCOPY WITH BAL AND WASHINGS;  Surgeon: Khalif Yanez MD;  Location: [facility] ENDOSCOPY;  Service: Pulmonary;  Laterality: N/A;  MUCUS PLUGGING    CARDIAC CATHETERIZATION  1996    CARDIAC SURGERY      CARDIAC SURGERY      fluid drained from heart    CATARACT EXTRACTION, BILATERAL  2003    COLONOSCOPY  2014    ENDOSCOPY  2016 2019    FEMORAL ARTERY STENT Bilateral     HYSTERECTOMY      LUNG BIOPSY Left 2005    lobectomy upper lung caner    LUNG VOLUME REDUCTION      OTHER SURGICAL HISTORY      artifical joints/limbs    REPLACEMENT TOTAL KNEE Left 2016    UPPER GASTROINTESTINAL ENDOSCOPY       PT Assessment (Last 12 Hours)       PT Evaluation and Treatment       Row Name 06/03/24 1437          Physical Therapy Time and Intention    Subjective Information no complaints  -DP     Document Type evaluation  -DP     Mode of Treatment individual therapy;physical therapy  -DP     Patient Effort good  -DP       Row Name 06/03/24 1437          General Information    Patient Profile Reviewed yes  -DP     Patient Observations alert;cooperative  -DP     Prior Level of Function independent:;gait;transfer;bed mobility;ADL's  -DP     Equipment Currently Used at Home oxygen;walker, rolling  -DP     Existing Precautions/Restrictions fall  -DP     Barriers to Rehab none identified  -DP       Row Name 06/03/24 1437          Living Environment    Current Living Arrangements home  -DP     Home Accessibility stairs to enter home  -DP     People in Home spouse  -DP       Row Name 06/03/24 1437          Home Main Entrance    Number of Stairs, Main Entrance three  -DP       Row Name 06/03/24 1437          Range of  Motion (ROM)    Range of Motion bilateral lower extremities;ROM is WFL  -DP       Row Name 06/03/24 1437          Strength Comprehensive (MMT)    General Manual Muscle Testing (MMT) Assessment lower extremity strength deficits identified  -DP     Comment, General Manual Muscle Testing (MMT) Assessment BLE: 3-/5  -DP       Row Name 06/03/24 1437          Bed Mobility    Bed Mobility supine-sit-supine  -DP     Supine-Sit-Supine [place] (Bed Mobility) contact guard  -DP       Row Name 06/03/24 1437          Transfers    Transfers sit-stand transfer  -DP       Row Name 06/03/24 1437          Sit-Stand Transfer    Sit-Stand [place] (Transfers) minimum assist (75% patient effort)  -DP     Assistive Device (Sit-Stand Transfers) walker, front-wheeled  -DP       Row Name 06/03/24 1437          Safety Issues, Functional Mobility    Impairments Affecting Function (Mobility) balance;endurance/activity tolerance;strength  -DP       Row Name 06/03/24 1437          Balance    Balance Assessment sitting static balance  -DP     Static Standing Balance minimal assist  -DP       Row Name 06/03/24 1437          Plan of Care Review    Plan of Care Reviewed With patient  -DP     Outcome Evaluation Pt presents with decreased strength, transfers and functional mobility. Will benefit from inpatient PT services and continued PT services upon discharge.  -DP       Row Name 06/03/24 1437          Therapy Assessment/Plan (PT)    Rehab Potential (PT) good, to achieve stated therapy goals  -DP     Criteria for Skilled Interventions Met (PT) yes;meets criteria  -DP     Therapy Frequency (PT) daily  -DP     Predicted Duration of Therapy Intervention (PT) 10 days  -DP     Problem List (PT) problems related to;mobility  -DP     Activity Limitations Related to Problem List (PT) unable to transfer safely;unable to ambulate safely  -DP       Row Name 06/03/24 1437          PT Evaluation Complexity    History, PT Evaluation Complexity no  personal factors and/or comorbidities  -DP     Examination of Body Systems (PT Eval Complexity) total of 4 or more elements  -DP     Clinical Presentation (PT Evaluation Complexity) stable  -DP     Clinical Decision Making (PT Evaluation Complexity) low complexity  -DP     Overall Complexity (PT Evaluation Complexity) low complexity  -DP       Row Name 06/03/24 1437          Physical Therapy Goals    Transfer Goal Selection (PT) transfer, PT goal 1  -DP     Gait Training Goal Selection (PT) gait training, PT goal 1  -DP       Row Name 06/03/24 1437          Transfer Goal 1 (PT)    Activity/Assistive Device (Transfer Goal 1, PT) sit-to-stand/stand-to-sit  -DP     [place] Level/Cues Needed (Transfer Goal 1, PT) supervision required  -DP     Time Frame (Transfer Goal 1, PT) 10 days  -DP       Row Name 06/03/24 1437          Gait Training Goal 1 (PT)    Activity/Assistive Device (Gait Training Goal 1, PT) assistive device use;walker, rolling  -DP     [place] Level (Gait Training Goal 1, PT) supervision required  -DP     Distance (Gait Training Goal 1, PT) 200  -DP     Time Frame (Gait Training Goal 1, PT) 10 days  -DP               User Key  (r) = Recorded By, (t) = Taken By, (c) = Cosigned By      Initials Name Provider Type    DP Kaylene Patterson, PT Physical Therapist                      PT Recommendation and Plan  Anticipated Discharge Disposition (PT): inpatient rehabilitation facility  Planned Therapy Interventions (PT): balance training, bed mobility training, gait training, strengthening, transfer training  Therapy Frequency (PT): daily  Plan of Care Reviewed With: patient  Outcome Evaluation: Pt presents with decreased strength, transfers and functional mobility. Will benefit from inpatient PT services and continued PT services upon discharge.   Outcome Measures       Row Name 06/03/24 1400             How much help from another person do you currently need...    Turning from your back to your side  while in flat bed without using bedrails? 4  -DP      Moving from lying on back to sitting on the side of a flat bed without bedrails? 3  -DP      Moving to and from a bed to a chair (including a wheelchair)? 3  -DP      Standing up from a chair using your arms (e.g., wheelchair, bedside chair)? 3  -DP      Climbing 3-5 steps with a railing? 2  -DP      To walk in hospital room? 2  -DP      AM-PAC 6 Clicks Score (PT) 17  -DP      Highest Level of Mobility Goal 5 --> Static standing  -DP         Functional Assessment    Outcome Measure Options AM-PAC 6 Clicks Basic Mobility (PT)  -DP                User Key  (r) = Recorded By, (t) = Taken By, (c) = Cosigned By      Initials Name Provider Type    Kaylene Perez, PT Physical Therapist                     Time Calculation:    PT Charges       Row Name 06/03/24 1433             Time Calculation    PT Received On 06/03/24  -DP      PT Goal Re-Cert Due Date 06/12/24  -DP         Untimed Charges    PT Eval/Re-eval Minutes 40  -DP         Total Minutes    Untimed Charges Total Minutes 40  -DP       Total Minutes 40  -DP                User Key  (r) = Recorded By, (t) = Taken By, (c) = Cosigned By      Initials Name Provider Type    Kaylene Perez, PT Physical Therapist                      PT G-Codes  Outcome Measure Options: AM-PAC 6 Clicks Basic Mobility (PT)  AM-PAC 6 Clicks Score (PT): 17  AM-PAC 6 Clicks Score (OT): 21    Kaylene Patterson, PT  6/3/2024

## 2024-06-04 ENCOUNTER — READMISSION MANAGEMENT (OUTPATIENT)
Dept: CALL CENTER | Facility: HOSPITAL | Age: 89
End: 2024-06-04
Payer: MEDICARE

## 2024-06-04 VITALS
HEART RATE: 101 BPM | SYSTOLIC BLOOD PRESSURE: 174 MMHG | TEMPERATURE: 97.7 F | OXYGEN SATURATION: 96 % | BODY MASS INDEX: 23.91 KG/M2 | RESPIRATION RATE: 20 BRPM | DIASTOLIC BLOOD PRESSURE: 53 MMHG | HEIGHT: 65 IN | WEIGHT: 143.52 LBS

## 2024-06-04 PROBLEM — I70.1 RENAL ARTERY STENOSIS: Status: ACTIVE | Noted: 2024-06-03

## 2024-06-04 LAB
ANION GAP SERPL CALCULATED.3IONS-SCNC: 9.2 MMOL/L (ref 5–15)
BACTERIA SPEC AEROBE CULT: NORMAL
BACTERIA SPEC AEROBE CULT: NORMAL
BUN SERPL-MCNC: 35 MG/DL (ref 8–23)
BUN/CREAT SERPL: 15.4 (ref 7–25)
CALCIUM SPEC-SCNC: 8.3 MG/DL (ref 8.6–10.5)
CHLORIDE SERPL-SCNC: 100 MMOL/L (ref 98–107)
CO2 SERPL-SCNC: 26.8 MMOL/L (ref 22–29)
CREAT SERPL-MCNC: 2.27 MG/DL (ref 0.57–1)
DEPRECATED RDW RBC AUTO: 52.8 FL (ref 37–54)
EGFRCR SERPLBLD CKD-EPI 2021: 20.4 ML/MIN/1.73
ERYTHROCYTE [DISTWIDTH] IN BLOOD BY AUTOMATED COUNT: 14.6 % (ref 12.3–15.4)
GLUCOSE BLDC GLUCOMTR-MCNC: 141 MG/DL (ref 70–99)
GLUCOSE BLDC GLUCOMTR-MCNC: 86 MG/DL (ref 70–99)
GLUCOSE SERPL-MCNC: 109 MG/DL (ref 65–99)
HCT VFR BLD AUTO: 30.2 % (ref 34–46.6)
HGB BLD-MCNC: 9.2 G/DL (ref 12–15.9)
MAGNESIUM SERPL-MCNC: 2 MG/DL (ref 1.6–2.4)
MCH RBC QN AUTO: 30.2 PG (ref 26.6–33)
MCHC RBC AUTO-ENTMCNC: 30.5 G/DL (ref 31.5–35.7)
MCV RBC AUTO: 99 FL (ref 79–97)
PHOSPHATE SERPL-MCNC: 3.4 MG/DL (ref 2.5–4.5)
PLATELET # BLD AUTO: 158 10*3/MM3 (ref 140–450)
PMV BLD AUTO: 10.1 FL (ref 6–12)
POTASSIUM SERPL-SCNC: 3.8 MMOL/L (ref 3.5–5.2)
RBC # BLD AUTO: 3.05 10*6/MM3 (ref 3.77–5.28)
SODIUM SERPL-SCNC: 136 MMOL/L (ref 136–145)
WBC NRBC COR # BLD AUTO: 7.79 10*3/MM3 (ref 3.4–10.8)

## 2024-06-04 PROCEDURE — 85027 COMPLETE CBC AUTOMATED: CPT | Performed by: INTERNAL MEDICINE

## 2024-06-04 PROCEDURE — 83735 ASSAY OF MAGNESIUM: CPT | Performed by: INTERNAL MEDICINE

## 2024-06-04 PROCEDURE — 63710000001 INSULIN GLARGINE PER 5 UNITS: Performed by: INTERNAL MEDICINE

## 2024-06-04 PROCEDURE — 97161 PT EVAL LOW COMPLEX 20 MIN: CPT

## 2024-06-04 PROCEDURE — 84100 ASSAY OF PHOSPHORUS: CPT | Performed by: INTERNAL MEDICINE

## 2024-06-04 PROCEDURE — 82948 REAGENT STRIP/BLOOD GLUCOSE: CPT | Performed by: INTERNAL MEDICINE

## 2024-06-04 PROCEDURE — 63710000001 PREDNISONE PER 1 MG: Performed by: NURSE PRACTITIONER

## 2024-06-04 PROCEDURE — 94761 N-INVAS EAR/PLS OXIMETRY MLT: CPT

## 2024-06-04 PROCEDURE — 82948 REAGENT STRIP/BLOOD GLUCOSE: CPT

## 2024-06-04 PROCEDURE — 99231 SBSQ HOSP IP/OBS SF/LOW 25: CPT | Performed by: INTERNAL MEDICINE

## 2024-06-04 PROCEDURE — 80048 BASIC METABOLIC PNL TOTAL CA: CPT | Performed by: INTERNAL MEDICINE

## 2024-06-04 PROCEDURE — 94799 UNLISTED PULMONARY SVC/PX: CPT

## 2024-06-04 PROCEDURE — 99239 HOSP IP/OBS DSCHRG MGMT >30: CPT | Performed by: INTERNAL MEDICINE

## 2024-06-04 PROCEDURE — 94664 DEMO&/EVAL PT USE INHALER: CPT

## 2024-06-04 RX ORDER — LOSARTAN POTASSIUM 100 MG/1
100 TABLET ORAL DAILY
Qty: 30 TABLET | Refills: 0 | Status: SHIPPED | OUTPATIENT
Start: 2024-06-04 | End: 2024-07-04

## 2024-06-04 RX ORDER — PREDNISONE 20 MG/1
40 TABLET ORAL
Qty: 8 TABLET | Refills: 0 | Status: SHIPPED | OUTPATIENT
Start: 2024-06-05 | End: 2024-06-09

## 2024-06-04 RX ADMIN — PREDNISONE 40 MG: 20 TABLET ORAL at 08:08

## 2024-06-04 RX ADMIN — SEVELAMER CARBONATE 800 MG: 800 TABLET, FILM COATED ORAL at 08:07

## 2024-06-04 RX ADMIN — IPRATROPIUM BROMIDE AND ALBUTEROL SULFATE 3 ML: .5; 3 SOLUTION RESPIRATORY (INHALATION) at 12:02

## 2024-06-04 RX ADMIN — ASPIRIN 81 MG: 81 TABLET, COATED ORAL at 08:08

## 2024-06-04 RX ADMIN — ARFORMOTEROL TARTRATE 15 MCG: 15 SOLUTION RESPIRATORY (INHALATION) at 07:11

## 2024-06-04 RX ADMIN — INSULIN GLARGINE 10 UNITS: 100 INJECTION, SOLUTION SUBCUTANEOUS at 08:06

## 2024-06-04 RX ADMIN — ISOSORBIDE DINITRATE 40 MG: 20 TABLET ORAL at 08:08

## 2024-06-04 RX ADMIN — Medication 10 ML: at 08:06

## 2024-06-04 RX ADMIN — CARVEDILOL 12.5 MG: 12.5 TABLET, FILM COATED ORAL at 08:08

## 2024-06-04 RX ADMIN — TOBRAMYCIN 300 MG: 300 SOLUTION RESPIRATORY (INHALATION) at 07:11

## 2024-06-04 RX ADMIN — IPRATROPIUM BROMIDE AND ALBUTEROL SULFATE 3 ML: .5; 3 SOLUTION RESPIRATORY (INHALATION) at 03:14

## 2024-06-04 RX ADMIN — PANTOPRAZOLE SODIUM 40 MG: 40 TABLET, DELAYED RELEASE ORAL at 08:08

## 2024-06-04 RX ADMIN — IPRATROPIUM BROMIDE AND ALBUTEROL SULFATE 3 ML: .5; 3 SOLUTION RESPIRATORY (INHALATION) at 07:11

## 2024-06-04 RX ADMIN — ROSUVASTATIN 20 MG: 20 TABLET, FILM COATED ORAL at 08:08

## 2024-06-04 RX ADMIN — BUDESONIDE 0.5 MG: 0.5 SUSPENSION RESPIRATORY (INHALATION) at 07:11

## 2024-06-04 NOTE — OUTREACH NOTE
Prep Survey      Flowsheet Row Responses   [facility] from? [facility]   Is LACE score < 7 ? No   Eligibility [facility]   Hospital Go to Cards  5/30/2024 - 6/4/2024 (5 days)   Date of Admission 05/30/24   Date of Discharge 06/04/24   Discharge Disposition [facility]   Discharge diagnosis Acute COPD exacerbation   Does the patient have one of the following disease processes/diagnoses(primary or secondary)? COPD   Does the patient have Home health ordered? Yes   What is the Home health agency?  [facility]   Is there a DME ordered? No   Comments regarding appointments 6/19/2024 9:30 AM Brennan Mosqueda MD   Prep survey completed? Yes            LITO HART - Registered Nurse

## 2024-06-04 NOTE — SIGNIFICANT NOTE
06/04/24 0904   OTHER   Discipline occupational therapist   Rehab Time/Intention   Session Not Performed   (dialysis)

## 2024-06-04 NOTE — THERAPY EVALUATION
Acute Care - Physical Therapy Initial Evaluation  [facility]     Patient Name: Charlette Rai  : 1935  MRN: [id number]  Today's Date: 2024      Visit Dx:     ICD-10-CM ICD-9-CM   1. Pneumonia due to infectious organism, unspecified laterality, unspecified part of lung  J18.9 486   2. Hypoxia  R09.02 799.02   3. Acute respiratory failure with hypercapnia  J96.02 518.81   4. Decreased activities of daily living (ADL)  Z78.9 V49.89   5. Acute on chronic respiratory failure with hypoxia and hypercapnia  J96.21 518.84    J96.22 786.09     799.02   6. Acute on chronic heart failure with preserved ejection fraction (HFpEF)  I50.33 428.23   7. Difficulty walking  R26.2 719.7     Patient Active Problem List   Diagnosis    Malignant neoplasm of upper lobe of right lung    Rheumatoid arthritis    Asthma    Acute on chronic heart failure with preserved ejection fraction (HFpEF)    Essential hypertension    GERD (gastroesophageal reflux disease)    Hyperlipidemia LDL goal <70    Lumbar spinal stenosis    Migraine    Monoclonal paraproteinemia    Osteopenia    Oxygen dependent    Peripheral neuropathy    Stage 4 chronic kidney disease    Vitamin D deficiency    Carotid artery stenosis    Chronic right-sided thoracic back pain    Peripheral vascular disease of lower extremity    Ex-smoker    De Quervain's tenosynovitis    Arthritis of carpometacarpal (CMC) joint of right thumb    Steal syndrome of dialysis vascular access    Anemia of chronic renal failure, stage 4 (severe)    Aortic stenosis, moderate    Hematoma    ESRD (end stage renal disease)    Coronary artery calcification seen on CT scan    Frailty syndrome in geriatric patient    COPD with lower respiratory infection    Coagulation defect, unspecified    Hyperphosphatemia    Hypothyroidism, unspecified    Idiopathic osteoarthritis    Secondary multiple arthritis    Type 2 diabetes mellitus with diabetic peripheral angiopathy without gangrene    Acute on  chronic respiratory failure with hypoxia and hypercapnia    Elevated troponin level not due myocardial infarction    Renal artery stenosis     Past Medical History:   Diagnosis Date    Allergic rhinitis     Anaphylactic shock, unspecified, initial encounter 12/05/2023    Anemia     Arthritis     Asthma     USE INHALERS AND NEBULIZERS    Back pain     Bladder disorder     Cancer     LEFT LUNG CANCER 2005 SURGERY AND CHEMO DONE  AND CURRENTLY 4/ 14/22  RIGHT LUNG CANCER HAS ONLY RECEIVED RADIATION THUS FAR    Chronic kidney disease     stage 3    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 DENIES CP BUT DOES HAVE SOA CHRONIC ISSUE COPD/LUNG CANCER    COPD (chronic obstructive pulmonary disease)     FOLLOWED BY DR MARIAJOSE BAILEY    Coronary artery disease     DENIES CP BUT DOES GET SOA MOST OF THE TIME WITH EXERTION BUT OCC AT REST CHRONIC ISSUE COPD/LUNG CANCER    Deep vein thrombosis     Diabetes mellitus     DOES NOT CHECK BS DAILY    Disease of thyroid gland     HYPOTHYROIDISM    Essential hypertension     Gastric ulcer     GERD (gastroesophageal reflux disease)     Heart murmur     History of transfusion     NO ISSUES POST TRANSFUSION WAS MANY YEARS AGO    Hyperlipemia     Leukocytopenia     FOLLOWED BY DR MIR BAILEY    Limb swelling     Lumbago     Lumbar spinal stenosis     Lung cancer     Migraine headache     Multiple joint pain     On home oxygen therapy     3L/NC PRN    Osteopenia     PNA (pneumonia) 05/04/2024    Pseudomonas pneumonia 04/29/2024    Reflux esophagitis     Shortness of breath     Thyroid nodule     HAS ULTRASOUND YEARLY BEING MONITORED    Vascular disease     Vitamin D deficiency      Past Surgical History:   Procedure Laterality Date    ABDOMINAL SURGERY      APPENDECTOMY      ARTERIOVENOUS FISTULA/SHUNT SURGERY Left 05/10/2022     Procedure: Left basilic vein transposition;  Surgeon: Wan Barksdale MD;  Location: [facility] MAIN OR;  Service: Vascular;  Laterality: Left;    ARTERIOVENOUS FISTULA/SHUNT SURGERY Left 3/23/2023    Procedure: Ligation of left arm arteriovenous fistula;  Surgeon: Wan Barksdale MD;  Location: [facility] MAIN OR;  Service: Vascular;  Laterality: Left;    ARTERIOVENOUS FISTULA/SHUNT SURGERY Left 11/30/2023    Procedure: Creation of left arm arteriovenous graft;  Surgeon: Wan Barksdale MD;  Location: [facility] MAIN OR;  Service: Vascular;  Laterality: Left;    BRONCHOSCOPY N/A 4/24/2024    Procedure: BRONCHOSCOPY WITH BAL AND WASHINGS;  Surgeon: Khalif Yanez MD;  Location: [facility] ENDOSCOPY;  Service: Pulmonary;  Laterality: N/A;  MUCUS PLUGGING    CARDIAC CATHETERIZATION  1996    CARDIAC SURGERY      CARDIAC SURGERY      fluid drained from heart    CATARACT EXTRACTION, BILATERAL  2003    COLONOSCOPY  2014    ENDOSCOPY  2016 2019    FEMORAL ARTERY STENT Bilateral     HYSTERECTOMY      LUNG BIOPSY Left 2005    lobectomy upper lung caner    LUNG VOLUME REDUCTION      OTHER SURGICAL HISTORY      artifical joints/limbs    REPLACEMENT TOTAL KNEE Left 2016    UPPER GASTROINTESTINAL ENDOSCOPY       PT Assessment (Last 12 Hours)       PT Evaluation and Treatment       Row Name 06/04/24 0944          Physical Therapy Time and Intention    Subjective Information no complaints  -DP     Document Type evaluation  -DP     Mode of Treatment individual therapy;physical therapy  -DP     Patient Effort good  -DP       Row Name 06/04/24 0944          General Information    Patient Profile Reviewed yes  -DP     Patient Observations alert;cooperative;agree to therapy  -DP     Prior Level of Function independent:;gait;transfer;bed mobility;ADL's  -DP     Equipment Currently Used at Home oxygen;walker, rolling  -DP     Existing Precautions/Restrictions oxygen therapy device and L/min;fall  -DP     Barriers to Rehab none identified  -DP        Row Name 06/04/24 0944          Living Environment    Current Living Arrangements home  -DP     Home Accessibility stairs to enter home  -DP     People in Home spouse;child(maryam), adult  -DP       Row Name 06/04/24 0944          Home Main Entrance    Number of Stairs, Main Entrance three  -DP       Row Name 06/04/24 0944          Cognition    Orientation Status (Cognition) oriented x 3  -DP       Row Name 06/04/24 0944          Range of Motion Comprehensive    General Range of Motion bilateral lower extremity ROM WFL  -DP       Row Name 06/04/24 0944          Strength (Manual Muscle Testing)    Strength (Manual Muscle Testing) bilateral lower extremities;strength is WFL  -DP       Row Name 06/04/24 0944          Bed Mobility    Bed Mobility supine-sit-supine  -DP     Supine-Sit-Supine [place] (Bed Mobility) independent  -DP       Row Name 06/04/24 0944          Transfers    Transfers sit-stand transfer  -DP       Row Name 06/04/24 0944          Sit-Stand Transfer    Sit-Stand [place] (Transfers) standby assist  -DP       Row Name 06/04/24 0944          Gait/Stairs (Locomotion)    Gait/Stairs Locomotion gait/ambulation independence  -DP     [place] Level (Gait) standby assist  -DP     Assistive Device (Gait) walker, front-wheeled  -DP     Patient was able to Ambulate yes  -DP     Distance in Feet (Gait) 150  -DP       Row Name 06/04/24 0944          Balance    Balance Assessment standing dynamic balance  -DP     Dynamic Standing Balance supervision  -DP     Position/Device Used, Standing Balance supported;walker, rolling  -DP       Row Name 06/04/24 0944          Plan of Care Review    Plan of Care Reviewed With patient  -DP     Outcome Evaluation Patient is able to complete all transfers and bed mobilities indepedently in the room. Pt is able to ambulate ad angélica in the room. Pt does not need inpatient PT services. Recommend DC home.  -DP       Row Name 06/04/24 0944          Therapy Assessment/Plan (PT)     Criteria for Skilled Interventions Met (PT) no problems identified which require skilled intervention  -DP     Therapy Frequency (PT) evaluation only  -DP       Row Name 06/04/24 0944          PT Evaluation Complexity    History, PT Evaluation Complexity no personal factors and/or comorbidities  -DP     Examination of Body Systems (PT Eval Complexity) total of 4 or more elements  -DP     Clinical Presentation (PT Evaluation Complexity) stable  -DP     Clinical Decision Making (PT Evaluation Complexity) low complexity  -DP     Overall Complexity (PT Evaluation Complexity) low complexity  -DP       Row Name 06/04/24 0944          Physical Therapy Goals    Problem Specific Goal Selection (PT) problem specific goal 1, PT  -DP       Row Name 06/04/24 0944          Problem Specific Goal 1 (PT)    Problem Specific Goal 1 (PT) Complete PT evaluation.  -DP     Time Frame (Problem Specific Goal 1, PT) 1 day  -DP     Progress/Outcome (Problem Specific Goal 1, PT) goal met  -DP               User Key  (r) = Recorded By, (t) = Taken By, (c) = Cosigned By      Initials Name Provider Type    Kaylene Perez, PT Physical Therapist                      PT Recommendation and Plan  Anticipated Discharge Disposition (PT): home, home with home health  Planned Therapy Interventions (PT): balance training, bed mobility training, gait training, strengthening, transfer training  Therapy Frequency (PT): evaluation only  Plan of Care Reviewed With: patient  Outcome Evaluation: Patient is able to complete all transfers and bed mobilities indepedently in the room. Pt is able to ambulate ad angélica in the room. Pt does not need inpatient PT services. Recommend DC home.   Outcome Measures       Row Name 06/04/24 0900 06/03/24 1400          How much help from another person do you currently need...    Turning from your back to your side while in flat bed without using bedrails? 4  -DP 4  -DP     Moving from lying on back to sitting on the side  of a flat bed without bedrails? 4  -DP 3  -DP     Moving to and from a bed to a chair (including a wheelchair)? 3  -DP 3  -DP     Standing up from a chair using your arms (e.g., wheelchair, bedside chair)? 3  -DP 3  -DP     Climbing 3-5 steps with a railing? 3  -DP 2  -DP     To walk in hospital room? 3  -DP 2  -DP     AM-PAC 6 Clicks Score (PT) 20  -DP 17  -DP     Highest Level of Mobility Goal 6 --> Walk 10 steps or more  -DP 5 --> Static standing  -DP        Functional Assessment    Outcome Measure Options AM-PAC 6 Clicks Basic Mobility (PT)  -DP AM-PAC 6 Clicks Basic Mobility (PT)  -DP               User Key  (r) = Recorded By, (t) = Taken By, (c) = Cosigned By      Initials Name Provider Type    Kaylene Perez, PT Physical Therapist                     Time Calculation:    PT Charges       Row Name 06/04/24 0946             Time Calculation    PT Received On 06/04/24  -DP         Untimed Charges    PT Eval/Re-eval Minutes 40  -DP         Total Minutes    Untimed Charges Total Minutes 40  -DP       Total Minutes 40  -DP                User Key  (r) = Recorded By, (t) = Taken By, (c) = Cosigned By      Initials Name Provider Type    Kaylene Perez, PT Physical Therapist                  Therapy Charges for Today       Code Description Service Date Service Provider Modifiers Qty    [id number] HC PT EVAL LOW COMPLEXITY 3 6/3/2024 Kaylene Patterson, PT GP 1            PT G-Codes  Outcome Measure Options: AM-PAC 6 Clicks Basic Mobility (PT)  AM-PAC 6 Clicks Score (PT): 20  AM-PAC 6 Clicks Score (OT): 21    Kaylene Patterson PT  6/4/2024

## 2024-06-04 NOTE — PLAN OF CARE
Goal Outcome Evaluation:  Plan of Care Reviewed With: patient        Progress: no change     Alert and oriented X4. Slept well throughout the night. C/o back pain resolved with Tylenol. Vitals stable. No overnight concerns or events to report.

## 2024-06-04 NOTE — DISCHARGE INSTR - OTHER ORDERS
Your Rx for Tobramycin nebulizer solution has been sent to [facility] specialty pharmacy. The medication will be shipped to you from [facility] pharmacy.  Please call [facility] [phone] to let them know when to deliver your medication. Should you have a high copay, call [facility] reimbursement counselor to apply for assistance at [phone].

## 2024-06-04 NOTE — PROGRESS NOTES
[facility]     Progress Note    Patient Name: Charlette Rai  : 1935  MRN: [id number]  Primary Care Physician:  Rafael Lyons MD  Date of admission: 2024    Subjective   Subjective     Patient is slowly improving.  No acute events overnight.    Objective   Objective     Vitals:   Temp:  [97.3 °F (36.3 °C)-98.1 °F (36.7 °C)] 98.1 °F (36.7 °C)  Heart Rate:  [66-83] 74  Resp:  [16-20] 18  BP: (142-183)/() 164/60  Flow (L/min):  [1-3] 3    Labs     Results from last 7 days   Lab Units 24  0323 24  0557 24  0505   WBC 10*3/mm3 7.95 8.06 8.50   HEMOGLOBIN g/dL 8.7* 9.0* 8.8*   HEMATOCRIT % 28.4* 29.8* 29.7*   PLATELETS 10*3/mm3 138* 139* 143         Results from last 7 days   Lab Units 24  0323 24  0557 24  0505   CREATININE mg/dL 3.32* 2.56* 3.02*         Results from last 7 days   Lab Units 24  1229   INR  1.02          Lab 24  0000   HEMOGLOBIN A1C 5.4         Lab Results   Component Value Date    LDL 36 2023              Physical Exam   Still significant dyspnea.  On oxygen.        Assessment & Plan   Assessment / Plan     Active Hospital Problems:  Active Hospital Problems    Diagnosis     **Acute on chronic respiratory failure with hypoxia and hypercapnia     Elevated troponin level not due myocardial infarction     Aortic stenosis, moderate     Acute on chronic heart failure with preserved ejection fraction (HFpEF)          Assessment/Plan:    Discussed with the patient further plan.  I think right radial access with attempted renal intervention if high-grade stenosis noted.  Will see if her renal function improves and she does not have fluid overload issues after that.  Then we will remove the graft because that might be contributing to increased preload and heart failure also.  I explained to the patient that intervention may not be possible because of her access and we may still not have much improvement with that.  She understands  that.  She is okay to be discharged.  Will plan this outpatient next week.    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  She does not have any food fear or weight loss or mesenteric ischemia symptoms.  Will continue to monitor this conservatively.    Electronically signed by Dio Miguel MD, 06/03/24, 8:13 PM EDT.

## 2024-06-04 NOTE — PROGRESS NOTES
CARDIOLOGY  INPATIENT PROGRESS NOTE                [facility] 4TH FLOOR MEDICAL TELEMETRY UNIT    6/4/2024      PATIENT IDENTIFICATION:   Name:  Charlette Rai      MRN:  [id number]     89 y.o.  female             Chief Complaint   Patient presents with    Shortness of Breath        SUBJECTIVE:   Patient clinically improving shortness of breath decreased no events overnight  OBJECTIVE:  Vitals:    06/04/24 0705 06/04/24 0800 06/04/24 1202 06/04/24 1215   BP:  176/53  174/53   BP Location:  Right arm  Right arm   Patient Position:  Sitting  Sitting   Pulse: 76 82 78 101   Resp: 18 18 20 20   Temp:  98.1 °F (36.7 °C)  97.7 °F (36.5 °C)   TempSrc:  Oral  Oral   SpO2: 100%  97% 96%   Weight:       Height:               Body mass index is 23.88 kg/m².    Intake/Output Summary (Last 24 hours) at 6/4/2024 1243  Last data filed at 6/4/2024 0320  Gross per 24 hour   Intake 720 ml   Output 3000 ml   Net -2280 ml         Physical Exam  General Appearance:   no acute distress  Alert and oriented x3  HENT:   lips not cyanotic  Atraumatic  Neck:  No jvd   supple  Respiratory:  no respiratory distress  normal breath sounds  no rales  Cardiovascular:    regular rhythm  no S3, no S4   no murmur  no rub  lower extremity edema: none    Skin:   warm, dry  No rashes      No Known Allergies  Scheduled meds:  arformoterol, 15 mcg, Nebulization, BID - RT  aspirin, 81 mg, Oral, Daily  budesonide, 0.5 mg, Nebulization, BID - RT  carvedilol, 12.5 mg, Oral, BID With Meals  insulin glargine, 10 Units, Subcutaneous, Daily  insulin lispro, 2-7 Units, Subcutaneous, 4x Daily AC & at Bedtime  ipratropium-albuterol, 3 mL, Nebulization, Q4H  isosorbide dinitrate, 40 mg, Oral, TID - Nitrates  losartan, 100 mg, Oral, Q24H  pantoprazole, 40 mg, Oral, BID AC  predniSONE, 40 mg, Oral, Daily With Breakfast  rosuvastatin, 20 mg, Oral, Daily  sevelamer, 800 mg, Oral, TID With Meals  sodium chloride, 10 mL, Intravenous, Q12H  tobramycin PF, 300  "mg, Nebulization, BID - RT      IV meds:                         Data Review:  CBC          6/2/2024    05:57 6/3/2024    03:23 6/4/2024    05:29   CBC   WBC 8.06  7.95  7.79    RBC 2.97  2.87  3.05    Hemoglobin 9.0  8.7  9.2    Hematocrit 29.8  28.4  30.2    .3  99.0  99.0    MCH 30.3  30.3  30.2    MCHC 30.2  30.6  30.5    RDW 14.7  14.7  14.6    Platelets 139  138  158      CMP          6/2/2024    05:57 6/3/2024    03:23 6/4/2024    05:29   CMP   Glucose 147  151  109    BUN 37  52  35    Creatinine 2.56  3.32  2.27    EGFR 17.7  12.9  20.4    Sodium 134  134  136    Potassium 4.2  3.8  3.8    Chloride 98  97  100    Calcium 8.0  7.8  8.3    BUN/Creatinine Ratio 14.5  15.7  15.4    Anion Gap 9.8  10.6  9.2       CARDIAC LABS:      Lab 05/31/24  0922 05/31/24  0620 05/30/24  1229 05/30/24  0811 05/30/24  0559   PROBNP  --   --   --   --  18,671.0*   HSTROP T 153* 151* 163* 108* 66*   PROTIME  --   --  13.6  --  13.9   INR  --   --  1.02  --  1.05        No results found for: \"DIGOXIN\"   Lab Results   Component Value Date    TSH 3.630 05/01/2024           Invalid input(s): \"LDLCALC\"  No results found for: \"POCTROP\"  Lab Results   Component Value Date    TROPONINT 153 (C) 05/31/2024   (  Lab Results   Component Value Date    MG 2.0 06/04/2024     Results for orders placed during the hospital encounter of 05/30/24    Adult Transthoracic Echo Complete W/ Cont if Necessary Per Protocol    Interpretation Summary    Left ventricular systolic function is normal. Estimated left ventricular EF = 55%    Left ventricular wall thickness is consistent with mild to moderate concentric hypertrophy. Sigmoid-shaped ventricular septum is present.    Left ventricular diastolic function is consistent with (grade Ia w/high LAP) impaired relaxation.    The left atrial cavity is mildly dilated.    Moderate aortic valve stenosis is present.    Moderate mitral valve stenosis is present.    Estimated right ventricular systolic " pressure from tricuspid regurgitation is moderately elevated (45-55 mmHg).           ASSESSMENT:    Acute on chronic respiratory failure with hypoxia and hypercapnia    Acute on chronic heart failure with preserved ejection fraction (HFpEF)    Aortic stenosis, moderate    Elevated troponin level not due myocardial infarction        PLAN:  1.  Continue with current medical treatment for CHF the patient be discharged to cardiac standpoint follow-up as outpatient          León Sanchez MD  6/4/2024    12:43 EDT

## 2024-06-04 NOTE — PLAN OF CARE
Problem: Adult Inpatient Plan of Care  Goal: Plan of Care Review  Outcome: Ongoing, Progressing   Goal Outcome Evaluation:  Plan of Care Reviewed With: patient        Progress: improving  Outcome Evaluation: Patient maintained O2 level above 90% on 2L/NC, decreased edema to BLE.  Patient denies pain.      Patient being discharged home today with  and daughter, to resume diaylysis

## 2024-06-04 NOTE — PLAN OF CARE
Problem: Adult Inpatient Plan of Care  Goal: Plan of Care Review  Outcome: Ongoing, Progressing   Goal Outcome Evaluation:  Plan of Care Reviewed With: patient        Progress: improving  Outcome Evaluation: Patient maintained O2 level above 90% on 2L/NC, decreased edema to BLE.  Patient denies pain.

## 2024-06-04 NOTE — PLAN OF CARE
Goal Outcome Evaluation:  Plan of Care Reviewed With: patient        Progress: improving  Outcome Evaluation: Patient maintained O2 level above 90% on 2L/NC, decreased edema to BLE.  Patient denies pain.    Patient to be discharged home today with  and daughter. Patient to resume dialysis Mon/Wed/Fri.  Discharge instruction provided with verbalized understanding.

## 2024-06-04 NOTE — CONSULTS
RT CM previously arranged for home Moy with [facility] pharmacy.     I followed up with [facility] regarding Rx status. 5/7 Rx is still current [facility] has been awaiting return call from Ms. Cecelia regarding medication delivery scheduling. I phoned patient's daughter, Sanjuana, and provided her with [facility] specialty pharmacy contact information to schedule delivery.       [employer] shipped today.  Representative states they had issues with reaching the patient.

## 2024-06-04 NOTE — PLAN OF CARE
Problem: Adult Inpatient Plan of Care  Goal: Plan of Care Review  Outcome: Ongoing, Progressing   Goal Outcome Evaluation:  Plan of Care Reviewed With: patient        Progress: improving  Outcome Evaluation: Patient maintained O2 level above 90% on 2L/NC, decreased edema to BLE.  Patient denies pain.    Patient discharged home with daughter and . Provided extensive education to daughter regarding respiratory medication.

## 2024-06-04 NOTE — DISCHARGE SUMMARY
[facility]         HOSPITALIST  DISCHARGE SUMMARY    Patient Name: Charlette Rai  : 1935  MRN: [id number]    Date of Admission: 2024  Date of Discharge:  24  Primary Care Physician: Rafael Lyons MD    Consultants:  -Cardiology: Dr. León Sanchez, Dr. Justus Wills  -Vascular surgery: Dr. Dio Miguel  -Pulmonology: Dr. Khalif Yanez, Dr. Rosalinda Bocanegra  -Nephrology: Dr. 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 female  with left lung cancer s/p resection and chemo, right lung cancer s/p radiation, COPD, CAD, ESRD on hemodialysis, essential hypertension who had recent admission to the hospital for Pseudomonas pneumonia and had discharged to rehab and subsequently discharged home from rehab for only a couple of days before she experienced a severe shortness of breath prompting her to come to ED for evaluation.  Patient significantly hypertensive and hypoxic.  Patient placed on BiPAP.  CXR showed right lung opacities.  Patient started on broad-spectrum empiric antibiotics.  Troponin was noted to be elevated higher than previous values and proBNP elevated as well, patient started on heparin drip.  Pulmonology, cardiology, vascular surgery and nephrology consulted.  Echocardiogram obtained, normal EF and no wall motion abnormalities noted, moderate LVH and moderate valvular disease.  CT imaging obtained and patient found to have high-grade left renal stenosis, severe SMA stenosis, severe right common femoral  artery calcific disease.  Vascular plans to set patient up for outpatient left renal artery stenting.  Prior to discharge home patient completed walking oximetry test.  Patient noted to require 4 L nasal cannula and patient was set up with his prior to discharge home.  On day of discharge patient hemodynamically stable and no additional inpatient evaluation or workup necessary at this time, patient was discharged home with outpatient follow-up with PCP, heart failure clinic, pulmonology and vascular surgery.    DISCHARGE Follow Up Recommendations for labs and diagnostics:   -Follow-up with PCP in 3 to 5 days  -Follow-up with vascular surgery on 06/14/2024 for arteriogram  -Follow-up with heart failure clinic in 5 to 7 days  -Follow-up in COPD clinic in 1-2 weeks    Day of Discharge     Vital Signs:  Temp:  [97.2 °F (36.2 °C)-98.1 °F (36.7 °C)] 97.2 °F (36.2 °C)  Heart Rate:  [66-88] 76  Resp:  [16-20] 18  BP: (146-168)/() 150/50  Flow (L/min):  [1-3] 1.5  Physical Exam:   Gen: No acute distress, sitting up in bed, pleasant, conversant  Resp: Improved aeration, equal chest rise bilaterally, no wheezing/rales/rhonchi noted  Card: RRR, No m/r/g  Abd: Soft, Nontender, Nondistended, + bowel sounds     Discharge Details        Discharge Medications        New Medications        Instructions Start Date   predniSONE 20 MG tablet  Commonly known as: DELTASONE   40 mg, Oral, Daily With Breakfast   Start Date: June 5, 2024            Changes to Medications        Instructions Start Date   ipratropium-albuterol 0.5-2.5 mg/3 ml nebulizer  Commonly known as: DUO-NEB  What changed: additional instructions   3 mL, Nebulization, Every 4 Hours PRN      isosorbide dinitrate 20 MG tablet  Commonly known as: ISORDIL  What changed: additional instructions   20 mg, Oral, 3 Times Daily (Nitrates)      levocetirizine 5 MG tablet  Commonly known as: XYZAL  What changed: when to take this   TAKE 1 TABLET DAILY      losartan 100 MG  tablet  Commonly known as: COZAAR  What changed:   medication strength  how much to take  when to take this   100 mg, Oral, Daily             Continue These Medications        Instructions Start Date   acetaminophen 325 MG tablet  Commonly known as: TYLENOL   650 mg, Oral, Every 6 Hours PRN      albuterol sulfate  (90 Base) MCG/ACT inhaler  Commonly known as: PROVENTIL HFA;VENTOLIN HFA;PROAIR HFA   2 puffs, Inhalation, Every 4 Hours PRN      arformoterol 15 MCG/2ML nebulizer solution  Commonly known as: BROVANA   15 mcg, Nebulization, 2 Times Daily - RT      aspirin 81 MG EC tablet   81 mg, Oral, Daily      budesonide 0.5 MG/2ML nebulizer solution  Commonly known as: PULMICORT   0.5 mg, Nebulization, Daily - RT      carvedilol 12.5 MG tablet  Commonly known as: COREG   12.5 mg, Oral, 2 Times Daily With Meals      dexlansoprazole 60 MG capsule  Commonly known as: DEXILANT   60 mg, Oral, Daily      furosemide 20 MG tablet  Commonly known as: LASIX   20 mg, Oral, Daily      linagliptin 5 MG tablet tablet  Commonly known as: Tradjenta   5 mg, Oral, Daily      nitroglycerin 0.4 MG SL tablet  Commonly known as: NITROSTAT   Place 1 tablet under the tongue Every 5 (Five) Minutes As Needed for Chest Pain.      revefenacin 175 MCG/3ML nebulizer solution  Commonly known as: YUPELRI   175 mcg, Nebulization, Daily - RT      rosuvastatin 20 MG tablet  Commonly known as: CRESTOR   20 mg, Oral, Daily      saccharomyces boulardii 250 MG capsule  Commonly known as: FLORASTOR   250 mg, Oral, 2 Times Daily      sevelamer 800 MG tablet  Commonly known as: RENVELA   800 mg, Oral, 3 Times Daily With Meals      tobramycin  MG/5ML nebulizer solution  Commonly known as: Moy   300 mg, Nebulization, 2 Times Daily - RT, Cycle 30 days on, then 30 days off.               No Known Allergies    Discharge Disposition:      Diet:  Hospital:  Diet Order   Procedures    Diet: Regular/House, Renal, Fluid Restriction (240 mL/tray); Low  Sodium (2-3g), Low Potassium; 1500 mL/day; Fluid Consistency: Thin (IDDSI 0)       Discharge Activity:   Activity Instructions       Activity as Tolerated              CODE STATUS:  Code Status and Medical Interventions:   Ordered at: 05/30/24 0937     Code Status (Patient has no pulse and is not breathing):    CPR (Attempt to Resuscitate)     Medical Interventions (Patient has pulse or is breathing):    Full Support     Comments:    DNI       Future Appointments   Date Time Provider Department Center   6/19/2024  9:30 AM Brennan Mosqueda [place] [facility] [place] [facility]   11/21/2024  3:00 PM León Sanchez MD [facility] [place] [facility]       Additional Instructions for the Follow-ups that You Need to Schedule       Discharge Follow-up with Specified Provider: Dr. Brennan Mosqueda   As directed      To: Dr. Brennan Mosqueda   Follow Up Details: Follow-up in 3-5 days        Discharge Follow-up with Specified Provider: Dr. Dio Miguel   As directed      To: Dr. Dio Miguel   Follow Up Details: Appointment for arteriogram on 06/14/2024                Pertinent  and/or Most Recent Results     RADIOLOGY:  CT Angiogram Abdomen Pelvis [[id number]] Dylan as Reviewed   Order Status: Completed Collected: 05/31/24 2322    Updated: 05/31/24 2340   Narrative:     CT ANGIOGRAM ABDOMEN PELVIS-     Date of Exam: 5/31/2024 3:23 PM     Indications: possible renal artery stenosis; j18.9-pneumonia,  unspecified organism; r09.02-hypoxemia; j96.02-acute respiratory failure  with hypercapnia  Comparisons: 4/22/2024; 3/14/2024; 3/8/2024.     Technique:  CTA of the abdomen and pelvis was performed after the uneventful  intravenous administration of an unspecified amount of Isovue-370  contrast agent. Reconstructed 2D coronal and sagittal images were also  obtained. In addition, a 3-D volume rendered image was created for  interpretation. Automated exposure control and iterative reconstruction  methods were used.     Findings:     ARTERIAL FINDINGS:  Extensive atherosclerotic changes are identified.  High-grade luminal stenosis involves the origin of the celiac trunk.  High-grade origin stenosis involves the superior mesenteric artery  (SMA). There is high-grade origin stenosis of the left renal artery due  to calcified plaque. There is mild-to-moderate origin stenosis of the  right renal artery. There are single bilateral renal arteries. The  inferior mesenteric artery (LUZ) is widely patent without  hemodynamically significant arterial stenosis. There are endovascular  stents in place involving the bilateral common iliac arteries as well as  the left external iliac artery, which are patent. The remainder of the  bilateral iliac arterial systems are patent. Atherosclerotic changes  involve the partially imaged bilateral femoral arterial systems. 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 It has a  broad-based neck, as seen on image 369 of series 12 and adjacent images.  It is also well seen on series 2014.     ADDITIONAL FINDINGS: The subcapsular hematoma involving the right kidney  has decreased in size since the prior study from 3/8/2024. There is a  new or larger right pleural effusion. It may be partially loculated.  Calcified pleural plaques are seen, especially on the left. Atelectasis,  fibrosis, and/or infiltrate(s) may involve the lung bases. These  findings are probably chronic. Borderline cardiac enlargement is  possible. There is nonspecific pericardial calcification, which may be  related to the same process accounting for the calcified pleural  plaques. Please correlate with possible asbestos exposure. There are  suspected complex renal cysts bilaterally. There may be renal cortical  scarring, especially on the left. A moderate-to-large stool burden is  suggested. There are colonic diverticula without acute diverticulitis.  The appendix is not clearly  identified. The patient is undergone  hysterectomy. There are gallstones without definite acute cholecystitis.  No acute pancreatitis. Nonspecific intraluminal hyperdensities are seen  within the distended stomach. There are suspected surgical clips near  the gastroesophageal (GE) junction. Please correlate clinically. No  other definite acute findings are seen.      Impression:        1. High-grade arterial stenoses involve the origins of the celiac trunk,  superior mesenteric artery (SMA), and left renal artery.     2. Endovascular arterial stents are in place in the bilateral common  iliac arteries and the left external iliac artery. They are patent.     3. 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     4. The right renal subcapsular hematoma has decreased in size since  3/8/2024.     5. Please see above comments for further detail.              Please note that portions of this note were completed with a voice  recognition program.                    Electronically Signed By-Chicho Jones MD On:5/31/2024 11:38 PM       XR Chest 1 View [[id number]] Dylan as Reviewed   Order Status: Completed Collected: 05/31/24 0547    Updated: 05/31/24 0550   Narrative:     AP PORTABLE CHEST     HISTORY:  Hypoxia and dyspnea.     COMPARISON:  5/30/2024     TECHNIQUE:  AP portable chest x-ray.     FINDINGS:  Dialysis catheter in good position. Infiltrates at the right lung base  have improved. Interstitial changes in the right lung overall are  improved as well. The left hemithorax is stable, as is some fibrotic  change in the right midlung. No pneumothorax.      Impression:     Improved infiltrates in the right lung.     Electronically Signed By-Dr. Weston Castillo MD On:5/31/2024 5:48 AM       XR Chest 1 View [[id number]] Dylan as Reviewed   Order Status: Completed Collected: 05/30/24 0606    Updated: 05/30/24 0610   Narrative:      AP PORTABLE CHEST     HISTORY:  Shortness of air. History of left lung cancer.     COMPARISON:  5/1/2024     TECHNIQUE:  AP portable chest x-ray.     FINDINGS:  Dialysis catheter tip remains in the lower SVC. There is extensive  atherosclerotic calcification in the aorta. Postoperative changes are  noted in the left lung. Pleural plaques and pleural thickening in the  left hemithorax are similar to prior. Areas of chronic  consolidation/scarring in the left lung also appears stable. 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 There are new infiltrates at the right  lung base that likely reflect pneumonia. No pneumothorax. Background  COPD is present.      Impression:     New right basilar infiltrates concerning for pneumonia. The remainder of  the chest x-ray is largely stable from prior.     Electronically Signed By-Dr. Weston Castillo MD On:5/30/2024 6:08 AM     LAB RESULTS:      Lab 06/04/24  0529 06/03/24  0323 06/02/24  0557 06/01/24  0505 05/31/24  1417 05/31/24  0620 05/30/24  2337 05/30/24  2026 05/30/24  1229 05/30/24  0729 05/30/24  0559   WBC 7.79 7.95 8.06 8.50  --  7.52  --   --   --   --  10.27   HEMOGLOBIN 9.2* 8.7* 9.0* 8.8*  --  9.9*  --   --   --   --  10.7*   HEMATOCRIT 30.2* 28.4* 29.8* 29.7*  --  33.0*  --   --   --   --  36.6   PLATELETS 158 138* 139* 143  --  133*  --   --   --   --  176   NEUTROS ABS  --   --   --   --   --  6.56  --   --   --   --  6.65   IMMATURE GRANS (ABS)  --   --   --   --   --  0.03  --   --   --   --  0.04   LYMPHS ABS  --   --   --   --   --  0.34*  --   --   --   --  2.10   MONOS ABS  --   --   --   --   --  0.58  --   --   --   --  1.23*   EOS ABS  --   --   --   --   --  0.00  --   --   --   --  0.17   MCV 99.0* 99.0* 100.3* 101.0*  --  102.5*  --   --   --   --  102.5*   PROCALCITONIN  --   --   --   --   --   --   --   --  0.21  --   --    LACTATE  --   --   --   --   --   --   --   --   --   --  1.5   LACTATE, ARTERIAL  --   --   --   --   --   --   --    --   --  0.65  --    PROTIME  --   --   --   --   --   --   --   --  13.6  --  13.9   APTT  --   --   --   --  109.9* 57.1* 29.7* 143.8* 31.2*  --  28.2*         Lab 06/04/24  0529 06/03/24  0323 06/02/24  0557 06/01/24  0505 05/31/24  0620 05/30/24  0729 05/30/24  0559 05/29/24  0000   SODIUM 136 134* 134* 130* 133*  --    < >  --    SODIUM, ARTERIAL  --   --   --   --   --  134.9*  --   --    POTASSIUM 3.8 3.8 4.2 5.3* 5.4*  --    < >  --    CHLORIDE 100 97* 98 94* 95*  --    < >  --    CO2 26.8 26.4 26.2 25.4 24.9  --    < >  --    ANION GAP 9.2 10.6 9.8 10.6 13.1  --    < >  --    BUN 35* 52* 37* 54* 30*  --    < >  --    CREATININE 2.27* 3.32* 2.56* 3.02* 2.44*  --    < >  --    EGFR 20.4* 12.9* 17.7* 14.5* 18.7*  --    < >  --    GLUCOSE 109* 151* 147* 191* 276*  --    < >  --    GLUCOSE, ARTERIAL  --   --   --   --   --  213*  --   --    CALCIUM 8.3* 7.8* 8.0* 8.2* 8.9  --    < >  --    IONIZED CALCIUM  --   --   --   --   --  1.11*  --   --    MAGNESIUM 2.0 2.2 2.0 2.2  --   --   --   --    PHOSPHORUS 3.4 3.7 3.7 4.4  --   --   --   --    HEMOGLOBIN A1C  --   --   --   --   --   --   --  5.4    < > = values in this interval not displayed.         Lab 05/30/24  0559   TOTAL PROTEIN 6.4   ALBUMIN 3.7   GLOBULIN 2.7   ALT (SGPT) 16   AST (SGOT) 24   BILIRUBIN 0.2   ALK PHOS 191*         Lab 05/31/24  0922 05/31/24  0620 05/30/24  1229 05/30/24  0811 05/30/24  0559   PROBNP  --   --   --   --  18,671.0*   HSTROP T 153* 151* 163* 108* 66*   PROTIME  --   --  13.6  --  13.9   INR  --   --  1.02  --  1.05             Lab 05/29/24  0000   FOLATE 8.7   VITAMIN B 12 333         Lab 05/30/24  0729   PH, ARTERIAL 7.250*   PCO2, ARTERIAL 72.0*   PO2 .0*   O2 SATURATION ART 98.3   FIO2 60   HCO3 ART 30.9*   BASE EXCESS ART 2.1*   CARBOXYHEMOGLOBIN 0.6     Brief Urine Lab Results  (Last result in the past 365 days)        Color   Clarity   Blood   Leuk Est   Nitrite   Protein   CREAT   Urine HCG        05/01/24 6556  Dark Yellow   Cloudy   Negative   Small (1+)   Negative   >=300 mg/dL (3+)                 Microbiology Results (last 10 days)       Procedure Component Value - Date/Time    MRSA Screen, PCR (Inpatient) - Swab, Nares [[id number]]  (Normal) Collected: 05/31/24 0529    Lab Status: Final result Specimen: Swab from Nares Updated: 05/31/24 0652     MRSA PCR No MRSA Detected    Narrative:      The negative predictive value of this diagnostic test is high and should only be used to consider de-escalating anti-MRSA therapy. A positive result may indicate colonization with MRSA and must be correlated clinically.    Blood Culture - Blood, Hand, Right [[id number]]  (Normal) Collected: 05/30/24 0818    Lab Status: Final result Specimen: Blood from Hand, Right Updated: 06/04/24 0845     Blood Culture No growth at 5 days    Blood Culture - Blood, Arm, Right [[id number]]  (Normal) Collected: 05/30/24 0811    Lab Status: Final result Specimen: Blood from Arm, Right Updated: 06/04/24 0845     Blood Culture No growth at 5 days              Results for orders placed during the hospital encounter of 03/13/24    Duplex Venous Lower Extremity - Right CAR    Interpretation Summary    Normal right lower extremity venous duplex scan.      Results for orders placed during the hospital encounter of 03/13/24    Duplex Venous Lower Extremity - Right CAR    Interpretation Summary    Normal right lower extremity venous duplex scan.      Results for orders placed during the hospital encounter of 05/30/24    Adult Transthoracic Echo Complete W/ Cont if Necessary Per Protocol    Interpretation Summary    Left ventricular systolic function is normal. Estimated left ventricular EF = 55%    Left ventricular wall thickness is consistent with mild to moderate concentric hypertrophy. Sigmoid-shaped ventricular septum is present.    Left ventricular diastolic function is consistent with (grade Ia w/high LAP) impaired relaxation.    The left atrial cavity is  mildly dilated.    Moderate aortic valve stenosis is present.    Moderate mitral valve stenosis is present.    Estimated right ventricular systolic pressure from tricuspid regurgitation is moderately elevated (45-55 mmHg).      Time spent on Discharge including face to face service:  32 minutes    Electronically signed by Pieter Salgado MD, 06/04/24, 8:55 AM EDT.

## 2024-06-05 LAB
MYCOBACTERIUM SPEC CULT: NORMAL
MYCOBACTERIUM SPEC CULT: NORMAL
NIGHT BLUE STAIN TISS: NORMAL

## 2024-06-06 ENCOUNTER — TELEPHONE (OUTPATIENT)
Dept: INTERNAL MEDICINE | Age: 89
End: 2024-06-06
Payer: MEDICARE

## 2024-06-06 NOTE — TELEPHONE ENCOUNTER
FYI:  06/06/24:  Deyanira from [facility] called asking for verbal ok to do start of care plan with PT and OT with patient.  Verbal ok was given by myself. However, when going to put in the message, it is noted patient is scheduled to establish care with Dr. Mosqueda but not until June 19, 2024.  Deyanira was told pt sees Dr. Mosqueda.      I called Deyanira back and cancelled the verbal ok as patient has not been established here with any provider as of this date. Deyanira voiced understanding.        Deyanira # [phone]

## 2024-06-07 ENCOUNTER — READMISSION MANAGEMENT (OUTPATIENT)
Dept: CALL CENTER | Facility: HOSPITAL | Age: 89
End: 2024-06-07
Payer: MEDICARE

## 2024-06-07 RX ORDER — ARFORMOTEROL TARTRATE 15 UG/2ML
15 SOLUTION RESPIRATORY (INHALATION)
Qty: 120 ML | Refills: 0 | OUTPATIENT
Start: 2024-06-07 | End: 2024-07-07

## 2024-06-07 NOTE — TELEPHONE ENCOUNTER
Caller: Sanjuana Cornell    Relationship: Emergency Contact    Best call back number: [phone]    Requested Prescriptions:   Requested Prescriptions     Pending Prescriptions Disp Refills    arformoterol (BROVANA) 15 MCG/2ML nebulizer solution 120 mL 0     Sig: Take 2 mL by nebulization 2 (Two) Times a Day for 30 days.        Pharmacy where request should be sent: [address] [phone] [facility] [phone]      Last office visit with prescribing clinician: Visit date not found   Last telemedicine visit with prescribing clinician: Visit date not found   Next office visit with prescribing clinician: 6/19/2024     Additional details provided by patient: PATIENT IS SCHEDULED FOR A NEW PATIENT APPOINTMENT WITH MD DOVER ON 6.19.24 IN THE OFFICE. PATIENT WAS SEEN BY MD DOVER WHILE AT [facility] AND HE HAD PRESCRIBED HER THIS MEDICATION TO TAKE DAILY. PATIENTS DAUGHTER STATES SHE IS COMPLETELY OUT OF THIS MEDICATION AND IS UN FOR SURE WHO TO REACH OUT TO SINCE MD DOVER ORIGINALLY WROTE THE PRESCRIPTION BUT AT A DIFFERENT LOCATION. PLEASE CALL DAUGHTER SANJUANA BACK TO LET HER KNOW IF THIS IS SOMETHING MD DOVER IS ABLE TO REFILL.     Does the patient have less than a 3 day supply:  [x] Yes  [] No    Would you like a call back once the refill request has been completed: [x] Yes [] No    If the office needs to give you a call back, can they leave a voicemail: [] Yes [] No    Cuate Benavidez Rep   06/07/24 10:13 EDT

## 2024-06-07 NOTE — OUTREACH NOTE
COPD/PN Week 1 Survey      Flowsheet Row Responses   [facility]? [facility]   Does the patient have one of the following disease processes/diagnoses(primary or secondary)? COPD   Week 1 attempt successful? No   Unsuccessful attempts Attempt 2            Sonali WINN - Registered Nurse

## 2024-06-08 NOTE — PROGRESS NOTES
Pulmonary / Critical Care Progress Note      Patient Name: Charlette Rai  : 1935  MRN: [id number]  Primary Care Physician:  Rafael Lyons MD  Date of admission: 2024    Subjective   Subjective   Follow-up for persistent bronchitis, respiratory failure, failure with airway clearance, ESRD on dialysis.    Continues to improve  Currently on 3 L of oxygen  Dyspnea.  Cough improved productive of thin clear secretions  No wheezing  Dyspnea improving  Tolerating dialysis  No nausea or vomiting.  No fever or chills.        Objective   Objective     Vitals:   Temp:  [97.3 °F (36.3 °C)-98.1 °F (36.7 °C)] 97.5 °F (36.4 °C)  Heart Rate:  [68-78] 68  Resp:  [14-18] 18  BP: (117-177)/(41-64) 140/52  Flow (L/min):  [3] 3    Physical Exam   Vital Signs Reviewed   General:  WDWN, Alert, no distress  HEENT:  PERRL, EOMI.  OP, nares clear  Chest: Poor aeration, unchanged minimal scattered rhonchi, no wheezing, bibasilar crackles, resonant to percussion bilaterally, TDC in place  CV: RRR, no MGR, pulses 2+, equal.  Abd:  Soft, NT, ND, + BS, no HSM  EXT:  no clubbing, no cyanosis, no edema  Neuro:  A&Ox3, CN grossly intact, no focal deficits.  Skin: No rashes or lesions noted      Result Review    Result Review:  I have personally reviewed the results from the time of this admission to 2024 10:44 EDT and agree with these findings:  [x]  Laboratory  [x]  Microbiology  [x]  Radiology  [x]  EKG/Telemetry   [x]  Cardiology/Vascular   []  Pathology  []  Old records  []  Other:  Most notable findings include:         Lab 24  0553 24  0617 24  0603 24  0552 24  0518 24  1350   WBC  --  7.19  --  9.76 5.25 7.66   HEMOGLOBIN  --  8.2*  --  8.1* 8.3* 8.5*   HEMATOCRIT  --  28.3*  --  26.8* 27.3* 28.2*   PLATELETS  --  174  --  183 152 150   SODIUM 136 141 136 136 139 138   POTASSIUM 4.6 4.0 4.1 4.2 4.2 4.2   CHLORIDE 104 108* 100 101 102 100   CO2 20.6* 24.8 22.8 23.3 22.1 25.3   BUN 33* 20  49* 33* 55* 48*   CREATININE 3.04* 2.33* 3.77* 3.11* 4.05* 4.21*   GLUCOSE 119* 89 105* 110* 239* 148*   CALCIUM 7.9* 8.1* 7.9* 8.3* 7.9* 7.9*   PHOSPHORUS  --  2.8  --  3.4 4.3  --    TOTAL PROTEIN  --   --   --   --   --  5.8*   ALBUMIN  --  2.9*  --  3.3*  --  3.3*   GLOBULIN  --   --   --   --   --  2.5            Assessment & Plan   Assessment / Plan     Active Hospital Problems:  Active Hospital Problems    Diagnosis     ESRD (end stage renal disease)     COPD exacerbation     COPD with acute exacerbation     ESRD (end stage renal disease)     Aortic stenosis, moderate     Anemia of chronic disease     Essential hypertension     Acute on chronic heart failure with preserved ejection fraction (HFpEF)     Chronic obstructive pulmonary disease with acute exacerbation          Impression:   Diastolic heart failure with acute exacerbation  COPD with acute exacerbation  Pseudomonas pneumonia  End-stage renal disease on dialysis  History of smoking in past  Persistent cough  Viral bronchitis with human metapneumovirus  Therapeutic drug monitoring of antibiotics    Plan:   Respiratory viral panel was positive for human metapneumovirus.  Bronchoscopy also positive for Pseudomonas  Continue with Brovana and Pulmicort nebulizers twice daily.  Was on Symbicort at home.  Will likely need scheduled DuoNeb 4 times daily for home.  Continue DuoNeb nebulizer 4 times a day  Continue chest vest, likely secretions obstructing right bronchus intermedius.  Continue dialysis per nephrology service.  Continue 14 days of antibiotics for Pseudomonas pneumonia.  Currently on cefepime and can de-escalate further based off cultures.  Continue JUAN nebs.  RT case management consult.  Appreciate them assisting with arranging for home nebulizers and chest vest  Cardiac meds including Coreg, hydralazine per cardiology  Continue chronic daily azithromycin 250 mg. . I noticed it was not on the MAR so I went ahead and restarted it     Ms Cecelia   has a history of bronchiectasis visualized by CT 04/22/2024 and daily productive cough for more than six months. Ms Rai has had ongoing issues with airway clearance and mucous plugging despite use of Aerobika OPEP.  Other treatments  have failed to mobilize secretions leading to recurrent hospitalizations (at least four since October 2023) and bronchoscopy. She will need to have HFCWO at home to loosen and help mobilize her secretions.     DVT prophylaxis:  Medical and mechanical DVT prophylaxis orders are present.    CODE STATUS:   Code Status (Patient has no pulse and is not breathing): CPR (Attempt to Resuscitate)  Medical Interventions (Patient has pulse or is breathing): Full Support      Labs, imaging, microbiology, notes and medications personally reviewed  Discussed with primary    Electronically signed by Clement Motta MD, 4/27/2024, 10:44 EDT.

## 2024-06-13 NOTE — PROGRESS NOTES
"Enter Query Response Below      Query Response: unable to determine               If applicable, please update the problem list.   Patient: Charlette Rai        : 1935  Account: [id number]           Admit Date: 2024        How to Respond to this query:       a. Click New Note     b. Answer query within the yellow box.                c. Update the Problem List, if applicable.      If you have any questions about this query contact me at:  [email or web address]     ,     The patient is noted with CHF, essential hypertension, and moderate aortic stenosis.  The home medication list includes Imdur, Cozaar, Aspirin, Coreg, Lasix, and Crestor.    24 Echo:  \"Left ventricular systolic function is normal. Estimated left ventricular EF = 55%. Left ventricular wall thickness is consistent with mild to moderate concentric hypertrophy. Sigmoid-shaped ventricular septum is present. Left ventricular diastolic function is consistent with (grade Ia w/high LAP) impaired relaxation. The left atrial cavity is mildly dilated. Moderate aortic valve stenosis is present. Moderate mitral valve stenosis is present.\"    Please clarify the etiology of the patient’s heart failure diagnosis:    Heart failure due to hypertension  Heart failure due to valvular disease  Heart failure due to hypertension and valvular disease  Other- specify__________  Unable to determine      By submitting this query, we are merely seeking further clarification of documentation to accurately reflect all conditions that you are monitoring, evaluating, treating or that extend the hospitalization or utilize additional resources of care. Please utilize your independent clinical judgment when addressing the question(s) above.     This query and your response, once completed, will be entered into the legal medical record.    Sincerely,  Sary MELTON, RN, CCDS  Clinical Documentation Improvement Program  [email or web address]    "

## 2024-06-14 ENCOUNTER — HOSPITAL ENCOUNTER (OUTPATIENT)
Facility: HOSPITAL | Age: 89
Discharge: HOME | End: 2024-06-15
Attending: SURGERY | Admitting: SURGERY
Payer: MEDICARE

## 2024-06-14 ENCOUNTER — APPOINTMENT (OUTPATIENT)
Facility: HOSPITAL | Age: 89
End: 2024-06-14
Payer: MEDICARE

## 2024-06-14 DIAGNOSIS — I70.1 RENAL ARTERY STENOSIS: ICD-10-CM

## 2024-06-14 PROBLEM — Z98.62 S/P RENAL ARTERY ANGIOPLASTY: Status: ACTIVE | Noted: 2024-06-14

## 2024-06-14 LAB
ANION GAP SERPL CALCULATED.3IONS-SCNC: 7.9 MMOL/L (ref 5–15)
BH CV LOWER VASCULAR LEFT COMMON FEMORAL AUGMENT: NORMAL
BH CV LOWER VASCULAR LEFT COMMON FEMORAL COMPETENT: NORMAL
BH CV LOWER VASCULAR LEFT COMMON FEMORAL COMPRESS: NORMAL
BH CV LOWER VASCULAR LEFT COMMON FEMORAL PHASIC: NORMAL
BH CV LOWER VASCULAR LEFT COMMON FEMORAL SPONT: NORMAL
BH CV LOWER VASCULAR LEFT DISTAL FEMORAL COMPRESS: NORMAL
BH CV LOWER VASCULAR LEFT GASTRONEMIUS COMPRESS: NORMAL
BH CV LOWER VASCULAR LEFT GREATER SAPH AK COMPRESS: NORMAL
BH CV LOWER VASCULAR LEFT GREATER SAPH BK COMPRESS: NORMAL
BH CV LOWER VASCULAR LEFT LESSER SAPH COMPRESS: NORMAL
BH CV LOWER VASCULAR LEFT MID FEMORAL AUGMENT: NORMAL
BH CV LOWER VASCULAR LEFT MID FEMORAL COMPETENT: NORMAL
BH CV LOWER VASCULAR LEFT MID FEMORAL COMPRESS: NORMAL
BH CV LOWER VASCULAR LEFT MID FEMORAL PHASIC: NORMAL
BH CV LOWER VASCULAR LEFT MID FEMORAL SPONT: NORMAL
BH CV LOWER VASCULAR LEFT PERONEAL COMPRESS: NORMAL
BH CV LOWER VASCULAR LEFT POPLITEAL AUGMENT: NORMAL
BH CV LOWER VASCULAR LEFT POPLITEAL COMPETENT: NORMAL
BH CV LOWER VASCULAR LEFT POPLITEAL COMPRESS: NORMAL
BH CV LOWER VASCULAR LEFT POPLITEAL PHASIC: NORMAL
BH CV LOWER VASCULAR LEFT POPLITEAL SPONT: NORMAL
BH CV LOWER VASCULAR LEFT POSTERIOR TIBIAL COMPRESS: NORMAL
BH CV LOWER VASCULAR LEFT PROXIMAL FEMORAL COMPRESS: NORMAL
BH CV LOWER VASCULAR LEFT SAPHENOFEMORAL JUNCTION COMPRESS: NORMAL
BH CV LOWER VASCULAR RIGHT COMMON FEMORAL AUGMENT: NORMAL
BH CV LOWER VASCULAR RIGHT COMMON FEMORAL COMPETENT: NORMAL
BH CV LOWER VASCULAR RIGHT COMMON FEMORAL COMPRESS: NORMAL
BH CV LOWER VASCULAR RIGHT COMMON FEMORAL PHASIC: NORMAL
BH CV LOWER VASCULAR RIGHT COMMON FEMORAL SPONT: NORMAL
BH CV LOWER VASCULAR RIGHT DISTAL FEMORAL COMPRESS: NORMAL
BH CV LOWER VASCULAR RIGHT GASTRONEMIUS COMPRESS: NORMAL
BH CV LOWER VASCULAR RIGHT GREATER SAPH AK COMPRESS: NORMAL
BH CV LOWER VASCULAR RIGHT GREATER SAPH BK COMPRESS: NORMAL
BH CV LOWER VASCULAR RIGHT MID FEMORAL AUGMENT: NORMAL
BH CV LOWER VASCULAR RIGHT MID FEMORAL COMPETENT: NORMAL
BH CV LOWER VASCULAR RIGHT MID FEMORAL COMPRESS: NORMAL
BH CV LOWER VASCULAR RIGHT MID FEMORAL PHASIC: NORMAL
BH CV LOWER VASCULAR RIGHT MID FEMORAL SPONT: NORMAL
BH CV LOWER VASCULAR RIGHT PERONEAL COMPRESS: NORMAL
BH CV LOWER VASCULAR RIGHT POPLITEAL AUGMENT: NORMAL
BH CV LOWER VASCULAR RIGHT POPLITEAL COMPETENT: NORMAL
BH CV LOWER VASCULAR RIGHT POPLITEAL COMPRESS: NORMAL
BH CV LOWER VASCULAR RIGHT POPLITEAL PHASIC: NORMAL
BH CV LOWER VASCULAR RIGHT POPLITEAL SPONT: NORMAL
BH CV LOWER VASCULAR RIGHT POSTERIOR TIBIAL COMPRESS: NORMAL
BH CV LOWER VASCULAR RIGHT PROXIMAL FEMORAL COMPRESS: NORMAL
BH CV LOWER VASCULAR RIGHT SAPHENOFEMORAL JUNCTION COMPRESS: NORMAL
BUN SERPL-MCNC: 25 MG/DL (ref 8–23)
BUN/CREAT SERPL: 11.6 (ref 7–25)
CALCIUM SPEC-SCNC: 8.6 MG/DL (ref 8.6–10.5)
CHLORIDE SERPL-SCNC: 98 MMOL/L (ref 98–107)
CO2 SERPL-SCNC: 34.1 MMOL/L (ref 22–29)
CREAT SERPL-MCNC: 2.16 MG/DL (ref 0.57–1)
EGFRCR SERPLBLD CKD-EPI 2021: 21.7 ML/MIN/1.73
GLUCOSE SERPL-MCNC: 120 MG/DL (ref 65–99)
POTASSIUM SERPL-SCNC: 3.7 MMOL/L (ref 3.5–5.2)
SODIUM SERPL-SCNC: 140 MMOL/L (ref 136–145)

## 2024-06-14 PROCEDURE — 63710000001 ACETAMINOPHEN 325 MG TABLET: Performed by: SURGERY

## 2024-06-14 PROCEDURE — 80048 BASIC METABOLIC PNL TOTAL CA: CPT | Performed by: SURGERY

## 2024-06-14 PROCEDURE — C1876 STENT, NON-COA/NON-COV W/DEL: HCPCS | Performed by: SURGERY

## 2024-06-14 PROCEDURE — 63710000001 SACCHAROMYCES BOULARDII 250 MG CAPSULE: Performed by: SURGERY

## 2024-06-14 PROCEDURE — 99152 MOD SED SAME PHYS/QHP 5/>YRS: CPT | Performed by: SURGERY

## 2024-06-14 PROCEDURE — A9270 NON-COVERED ITEM OR SERVICE: HCPCS | Performed by: SURGERY

## 2024-06-14 PROCEDURE — 63710000001 ASPIRIN 81 MG TABLET DELAYED-RELEASE: Performed by: SURGERY

## 2024-06-14 PROCEDURE — 63710000001 FUROSEMIDE 20 MG TABLET: Performed by: SURGERY

## 2024-06-14 PROCEDURE — 63710000001 LINAGLIPTIN 5 MG TABLET: Performed by: SURGERY

## 2024-06-14 PROCEDURE — C1769 GUIDE WIRE: HCPCS | Performed by: SURGERY

## 2024-06-14 PROCEDURE — 63710000001 CLOPIDOGREL 75 MG TABLET: Performed by: SURGERY

## 2024-06-14 PROCEDURE — C1894 INTRO/SHEATH, NON-LASER: HCPCS | Performed by: SURGERY

## 2024-06-14 PROCEDURE — C1887 CATHETER, GUIDING: HCPCS | Performed by: SURGERY

## 2024-06-14 PROCEDURE — 93970 EXTREMITY STUDY: CPT | Performed by: SURGERY

## 2024-06-14 PROCEDURE — 63710000001 LOSARTAN 50 MG TABLET: Performed by: SURGERY

## 2024-06-14 PROCEDURE — 0 IODIXANOL PER 1 ML: Performed by: SURGERY

## 2024-06-14 PROCEDURE — 25010000002 MIDAZOLAM PER 1MG: Performed by: SURGERY

## 2024-06-14 PROCEDURE — C1725 CATH, TRANSLUMIN NON-LASER: HCPCS | Performed by: SURGERY

## 2024-06-14 PROCEDURE — 63710000001 CETIRIZINE 10 MG TABLET: Performed by: SURGERY

## 2024-06-14 PROCEDURE — 99153 MOD SED SAME PHYS/QHP EA: CPT | Performed by: SURGERY

## 2024-06-14 PROCEDURE — 37236 OPEN/PERQ PLACE STENT 1ST: CPT | Performed by: SURGERY

## 2024-06-14 PROCEDURE — 76937 US GUIDE VASCULAR ACCESS: CPT | Performed by: SURGERY

## 2024-06-14 PROCEDURE — 25010000002 FENTANYL CITRATE (PF) 50 MCG/ML SOLUTION: Performed by: SURGERY

## 2024-06-14 PROCEDURE — 25010000002 PROTAMINE SULFATE PER 10 MG: Performed by: SURGERY

## 2024-06-14 PROCEDURE — 93970 EXTREMITY STUDY: CPT

## 2024-06-14 PROCEDURE — 25010000002 HEPARIN (PORCINE) PER 1000 UNITS: Performed by: SURGERY

## 2024-06-14 PROCEDURE — 94761 N-INVAS EAR/PLS OXIMETRY MLT: CPT

## 2024-06-14 PROCEDURE — 94799 UNLISTED PULMONARY SVC/PX: CPT

## 2024-06-14 PROCEDURE — 25010000002 CEFAZOLIN PER 500 MG: Performed by: SURGERY

## 2024-06-14 PROCEDURE — 63710000001 ROSUVASTATIN 5 MG TABLET: Performed by: SURGERY

## 2024-06-14 PROCEDURE — 94640 AIRWAY INHALATION TREATMENT: CPT

## 2024-06-14 PROCEDURE — 63710000001 CARVEDILOL 12.5 MG TABLET: Performed by: SURGERY

## 2024-06-14 PROCEDURE — 36252 INS CATH REN ART 1ST BILAT: CPT | Performed by: SURGERY

## 2024-06-14 PROCEDURE — 63710000001 ISOSORBIDE DINITRATE 20 MG TABLET: Performed by: SURGERY

## 2024-06-14 PROCEDURE — 63710000001 SEVELAMER 800 MG TABLET: Performed by: SURGERY

## 2024-06-14 DEVICE — PREMOUNTED STENT SYSTEM
Type: IMPLANTABLE DEVICE | Site: KIDNEY | Status: FUNCTIONAL
Brand: EXPRESS® SD RENAL/BILIARY

## 2024-06-14 RX ORDER — CLOPIDOGREL BISULFATE 75 MG/1
TABLET ORAL
Status: DISCONTINUED | OUTPATIENT
Start: 2024-06-14 | End: 2024-06-14 | Stop reason: HOSPADM

## 2024-06-14 RX ORDER — SODIUM CHLORIDE 9 MG/ML
75 INJECTION, SOLUTION INTRAVENOUS CONTINUOUS
Status: DISCONTINUED | OUTPATIENT
Start: 2024-06-14 | End: 2024-06-14

## 2024-06-14 RX ORDER — SODIUM CHLORIDE 9 MG/ML
50 INJECTION, SOLUTION INTRAVENOUS CONTINUOUS
Status: DISCONTINUED | OUTPATIENT
Start: 2024-06-14 | End: 2024-06-14

## 2024-06-14 RX ORDER — ROSUVASTATIN CALCIUM 20 MG/1
20 TABLET, COATED ORAL DAILY
Status: DISCONTINUED | OUTPATIENT
Start: 2024-06-14 | End: 2024-06-14 | Stop reason: DRUGHIGH

## 2024-06-14 RX ORDER — LOSARTAN POTASSIUM 50 MG/1
100 TABLET ORAL DAILY
Status: DISCONTINUED | OUTPATIENT
Start: 2024-06-14 | End: 2024-06-15 | Stop reason: HOSPADM

## 2024-06-14 RX ORDER — SEVELAMER CARBONATE 800 MG/1
800 TABLET, FILM COATED ORAL
Status: DISCONTINUED | OUTPATIENT
Start: 2024-06-14 | End: 2024-06-15 | Stop reason: HOSPADM

## 2024-06-14 RX ORDER — FENTANYL CITRATE 50 UG/ML
INJECTION, SOLUTION INTRAMUSCULAR; INTRAVENOUS
Status: DISCONTINUED | OUTPATIENT
Start: 2024-06-14 | End: 2024-06-14 | Stop reason: HOSPADM

## 2024-06-14 RX ORDER — SODIUM CHLORIDE 0.9 % (FLUSH) 0.9 %
10 SYRINGE (ML) INJECTION EVERY 12 HOURS SCHEDULED
Status: DISCONTINUED | OUTPATIENT
Start: 2024-06-14 | End: 2024-06-14 | Stop reason: HOSPADM

## 2024-06-14 RX ORDER — BUDESONIDE 0.5 MG/2ML
0.5 INHALANT ORAL
Status: DISCONTINUED | OUTPATIENT
Start: 2024-06-14 | End: 2024-06-15 | Stop reason: HOSPADM

## 2024-06-14 RX ORDER — HEPARIN SODIUM 1000 [USP'U]/ML
INJECTION, SOLUTION INTRAVENOUS; SUBCUTANEOUS
Status: DISCONTINUED | OUTPATIENT
Start: 2024-06-14 | End: 2024-06-14 | Stop reason: HOSPADM

## 2024-06-14 RX ORDER — PROTAMINE SULFATE 10 MG/ML
INJECTION, SOLUTION INTRAVENOUS
Status: DISCONTINUED | OUTPATIENT
Start: 2024-06-14 | End: 2024-06-14 | Stop reason: HOSPADM

## 2024-06-14 RX ORDER — VERAPAMIL HYDROCHLORIDE 2.5 MG/ML
INJECTION, SOLUTION INTRAVENOUS
Status: DISCONTINUED | OUTPATIENT
Start: 2024-06-14 | End: 2024-06-14 | Stop reason: HOSPADM

## 2024-06-14 RX ORDER — SODIUM CHLORIDE 0.9 % (FLUSH) 0.9 %
10 SYRINGE (ML) INJECTION AS NEEDED
Status: DISCONTINUED | OUTPATIENT
Start: 2024-06-14 | End: 2024-06-14 | Stop reason: HOSPADM

## 2024-06-14 RX ORDER — LIDOCAINE HYDROCHLORIDE 20 MG/ML
INJECTION, SOLUTION INFILTRATION; PERINEURAL
Status: DISCONTINUED | OUTPATIENT
Start: 2024-06-14 | End: 2024-06-14 | Stop reason: HOSPADM

## 2024-06-14 RX ORDER — PANTOPRAZOLE SODIUM 40 MG/1
40 TABLET, DELAYED RELEASE ORAL
Status: DISCONTINUED | OUTPATIENT
Start: 2024-06-15 | End: 2024-06-15 | Stop reason: HOSPADM

## 2024-06-14 RX ORDER — MIDAZOLAM HYDROCHLORIDE 2 MG/2ML
INJECTION, SOLUTION INTRAMUSCULAR; INTRAVENOUS
Status: DISCONTINUED | OUTPATIENT
Start: 2024-06-14 | End: 2024-06-14 | Stop reason: HOSPADM

## 2024-06-14 RX ORDER — CARVEDILOL 12.5 MG/1
12.5 TABLET ORAL 2 TIMES DAILY WITH MEALS
Status: DISCONTINUED | OUTPATIENT
Start: 2024-06-14 | End: 2024-06-15 | Stop reason: HOSPADM

## 2024-06-14 RX ORDER — SODIUM CHLORIDE 9 MG/ML
40 INJECTION, SOLUTION INTRAVENOUS AS NEEDED
Status: DISCONTINUED | OUTPATIENT
Start: 2024-06-14 | End: 2024-06-14 | Stop reason: HOSPADM

## 2024-06-14 RX ORDER — NITROGLYCERIN 0.4 MG/1
0.4 TABLET SUBLINGUAL
Status: DISCONTINUED | OUTPATIENT
Start: 2024-06-14 | End: 2024-06-15 | Stop reason: HOSPADM

## 2024-06-14 RX ORDER — ALBUTEROL SULFATE 2.5 MG/3ML
2.5 SOLUTION RESPIRATORY (INHALATION) EVERY 6 HOURS PRN
Status: DISCONTINUED | OUTPATIENT
Start: 2024-06-14 | End: 2024-06-15 | Stop reason: HOSPADM

## 2024-06-14 RX ORDER — IODIXANOL 320 MG/ML
INJECTION, SOLUTION INTRAVASCULAR
Status: DISCONTINUED | OUTPATIENT
Start: 2024-06-14 | End: 2024-06-14 | Stop reason: HOSPADM

## 2024-06-14 RX ORDER — CETIRIZINE HYDROCHLORIDE 10 MG/1
10 TABLET ORAL DAILY
Status: DISCONTINUED | OUTPATIENT
Start: 2024-06-14 | End: 2024-06-15 | Stop reason: HOSPADM

## 2024-06-14 RX ORDER — IPRATROPIUM BROMIDE AND ALBUTEROL SULFATE 2.5; .5 MG/3ML; MG/3ML
3 SOLUTION RESPIRATORY (INHALATION) EVERY 4 HOURS PRN
Status: DISCONTINUED | OUTPATIENT
Start: 2024-06-14 | End: 2024-06-15 | Stop reason: HOSPADM

## 2024-06-14 RX ORDER — ROSUVASTATIN CALCIUM 5 MG/1
10 TABLET, COATED ORAL NIGHTLY
Status: DISCONTINUED | OUTPATIENT
Start: 2024-06-14 | End: 2024-06-15 | Stop reason: HOSPADM

## 2024-06-14 RX ORDER — FUROSEMIDE 20 MG/1
20 TABLET ORAL DAILY
Status: DISCONTINUED | OUTPATIENT
Start: 2024-06-14 | End: 2024-06-15 | Stop reason: HOSPADM

## 2024-06-14 RX ORDER — SACCHAROMYCES BOULARDII 250 MG
250 CAPSULE ORAL 2 TIMES DAILY
Status: DISCONTINUED | OUTPATIENT
Start: 2024-06-14 | End: 2024-06-15 | Stop reason: HOSPADM

## 2024-06-14 RX ORDER — CLOPIDOGREL BISULFATE 75 MG/1
75 TABLET ORAL DAILY
Status: DISCONTINUED | OUTPATIENT
Start: 2024-06-14 | End: 2024-06-15 | Stop reason: HOSPADM

## 2024-06-14 RX ORDER — ASPIRIN 81 MG/1
81 TABLET ORAL DAILY
Status: DISCONTINUED | OUTPATIENT
Start: 2024-06-14 | End: 2024-06-15 | Stop reason: HOSPADM

## 2024-06-14 RX ORDER — ISOSORBIDE DINITRATE 20 MG/1
20 TABLET ORAL
Status: DISCONTINUED | OUTPATIENT
Start: 2024-06-14 | End: 2024-06-15 | Stop reason: HOSPADM

## 2024-06-14 RX ORDER — ACETAMINOPHEN 325 MG/1
650 TABLET ORAL EVERY 6 HOURS PRN
Status: DISCONTINUED | OUTPATIENT
Start: 2024-06-14 | End: 2024-06-15 | Stop reason: HOSPADM

## 2024-06-14 RX ADMIN — BUDESONIDE 0.5 MG: 0.5 SUSPENSION RESPIRATORY (INHALATION) at 16:05

## 2024-06-14 RX ADMIN — LOSARTAN POTASSIUM 100 MG: 50 TABLET, FILM COATED ORAL at 15:57

## 2024-06-14 RX ADMIN — CETIRIZINE HYDROCHLORIDE 10 MG: 10 TABLET, FILM COATED ORAL at 15:57

## 2024-06-14 RX ADMIN — LINAGLIPTIN 5 MG: 5 TABLET, FILM COATED ORAL at 17:20

## 2024-06-14 RX ADMIN — Medication 250 MG: at 20:43

## 2024-06-14 RX ADMIN — ISOSORBIDE DINITRATE 20 MG: 20 TABLET ORAL at 18:41

## 2024-06-14 RX ADMIN — ROSUVASTATIN CALCIUM 10 MG: 5 TABLET, FILM COATED ORAL at 20:43

## 2024-06-14 RX ADMIN — CARVEDILOL 12.5 MG: 12.5 TABLET, FILM COATED ORAL at 18:41

## 2024-06-14 RX ADMIN — SEVELAMER CARBONATE 800 MG: 800 TABLET, FILM COATED ORAL at 18:37

## 2024-06-14 RX ADMIN — ACETAMINOPHEN 650 MG: 325 TABLET ORAL at 15:44

## 2024-06-14 RX ADMIN — ASPIRIN 81 MG: 81 TABLET, COATED ORAL at 15:56

## 2024-06-14 RX ADMIN — FUROSEMIDE 20 MG: 20 TABLET ORAL at 17:20

## 2024-06-14 NOTE — INTERVAL H&P NOTE
H&P reviewed. The patient was examined and there are no changes to the H&P.    C/o significant leg swelling, left worse than right  Otherwise stable.  Understands and agrees with plan for angio and possible renal intervention

## 2024-06-14 NOTE — Clinical Note
R-Band was used in achieving hemostasis. Radial compression device applied to vessel. Hemostasis achieved successfully.

## 2024-06-14 NOTE — OP NOTE
CV ARTERIOGRAM  Procedure Report    Patient Name:  Charlette Rai  YOB: 1935    Date of Surgery:  6/14/2024     Indications: Renal artery stenosis.  Hypertensive urgency requiring multiple admissions.  End-stage renal disease on dialysis.    Pre-op Diagnosis:   Renal artery stenosis       Post-Op Diagnosis Codes:     * Renal artery stenosis [I70.1]    Procedure(s):  Right radial access, aortogram.  Bilateral selective renal artery to be accessed with arteriogram.  Left renal artery balloon angioplasty and stent placement, 6 mm x 18 mm balloon expandable stent with proximal inflated to 8 mm.  Monitoring and supervision for conscious sedation for 80 minutes.    Staff:  Surgeon(s):  Dio Miguel MD       Anesthesia: Sedation    Estimated Blood Loss: minimal    Implants:    Implant Name Type Inv. Item Serial No.  Lot No. LRB No. Used Action   STNT BILI EXPRSS SD [id number] 150CM - [id number] Stent STNT BILI EXPRSS SD [id number] 150CM  [employer]oz [id number] N/A 1 Implanted       Specimen: None       Findings: High-grade calcific left renal artery stenosis with an IFR of 0.8.  Widely patent right renal artery with no evidence of embolization after stent placement.  Mild right renal artery stenosis.  Diffuse distal abdominal aortic calcification.  Bilateral iliac stents noted.    Complications: None obvious    Description of Procedure: Patient was brought to the Cath Lab and placed supine.  Vital monitoring was started.  Timeout was performed.  Conscious sedation was initiated.  Right wrist was prepped and draped.  Ultrasound was used to access the right radial artery.  5 Mongolian sheath was placed.  Patient was heparinized.  C2 catheter was used to access the descending thoracic aorta.  A stiff wire was placed for support.  Destination slender sheath was then placed.  Aortogram was performed.  Significant left renal artery calcification was noted.  Renal artery was catheterized with a  Glidewire and a crossing catheter.  Intraluminal access was confirmed.  Significant renal artery stenosis was suggested.  A V18 wire was placed for support.  Pressure wire was then placed across the lesion which showed iFR of 0.8.  There was significant gradient in the pressure.  We decided to stent this lesion.  The lesion was predilated with a 3 mm balloon.  6 mm x 18 mm balloon expandable express stent was then deployed across the stenosis.  Proximal end of the stent was cleared using 8 mm balloon.  Arteriogram showed a widely patent stent with excellent flow into the kidney.  I was satisfied with result.  Right renal artery was then catheterized.  Good flow was noted into the right renal artery with reflux into the aorta.  There was only mild stenosis.  I did not think this required any intervention.  We decided to complete the procedure at this time.  Radial band was attempted to be placed.  Because of the patient's IV this did not control the bleeding site.  Manual compression was held for hemostasis.  Heparin was reversed with protamine.  The patient tolerated the procedure well.  I was present for the entire procedure.          Dio Miguel MD     Date: 6/14/2024  Time: 13:34 EDT

## 2024-06-15 ENCOUNTER — READMISSION MANAGEMENT (OUTPATIENT)
Dept: CALL CENTER | Facility: HOSPITAL | Age: 89
End: 2024-06-15
Payer: MEDICARE

## 2024-06-15 VITALS
DIASTOLIC BLOOD PRESSURE: 65 MMHG | HEART RATE: 78 BPM | HEIGHT: 64 IN | OXYGEN SATURATION: 99 % | TEMPERATURE: 97.3 F | BODY MASS INDEX: 26.57 KG/M2 | RESPIRATION RATE: 18 BRPM | SYSTOLIC BLOOD PRESSURE: 130 MMHG | WEIGHT: 155.65 LBS

## 2024-06-15 LAB
ANION GAP SERPL CALCULATED.3IONS-SCNC: 7.2 MMOL/L (ref 5–15)
ANISOCYTOSIS BLD QL: NORMAL
BASOPHILS # BLD AUTO: 0.02 10*3/MM3 (ref 0–0.2)
BASOPHILS NFR BLD AUTO: 0.2 % (ref 0–1.5)
BUN SERPL-MCNC: 30 MG/DL (ref 8–23)
BUN/CREAT SERPL: 12.1 (ref 7–25)
CALCIUM SPEC-SCNC: 8 MG/DL (ref 8.6–10.5)
CHLORIDE SERPL-SCNC: 98 MMOL/L (ref 98–107)
CO2 SERPL-SCNC: 33.8 MMOL/L (ref 22–29)
CREAT SERPL-MCNC: 2.47 MG/DL (ref 0.57–1)
DEPRECATED RDW RBC AUTO: 56.4 FL (ref 37–54)
EGFRCR SERPLBLD CKD-EPI 2021: 18.5 ML/MIN/1.73
EOSINOPHIL # BLD AUTO: 0.11 10*3/MM3 (ref 0–0.4)
EOSINOPHIL NFR BLD AUTO: 1.3 % (ref 0.3–6.2)
ERYTHROCYTE [DISTWIDTH] IN BLOOD BY AUTOMATED COUNT: 15 % (ref 12.3–15.4)
GLUCOSE SERPL-MCNC: 180 MG/DL (ref 65–99)
HBV SURFACE AG SERPL QL IA: NORMAL
HCT VFR BLD AUTO: 31.4 % (ref 34–46.6)
HGB BLD-MCNC: 9.5 G/DL (ref 12–15.9)
HYPOCHROMIA BLD QL: NORMAL
IMM GRANULOCYTES # BLD AUTO: 0.04 10*3/MM3 (ref 0–0.05)
IMM GRANULOCYTES NFR BLD AUTO: 0.5 % (ref 0–0.5)
LYMPHOCYTES # BLD AUTO: 0.55 10*3/MM3 (ref 0.7–3.1)
LYMPHOCYTES NFR BLD AUTO: 6.7 % (ref 19.6–45.3)
MACROCYTES BLD QL SMEAR: NORMAL
MCH RBC QN AUTO: 30.7 PG (ref 26.6–33)
MCHC RBC AUTO-ENTMCNC: 30.3 G/DL (ref 31.5–35.7)
MCV RBC AUTO: 101.6 FL (ref 79–97)
MONOCYTES # BLD AUTO: 0.92 10*3/MM3 (ref 0.1–0.9)
MONOCYTES NFR BLD AUTO: 11.1 % (ref 5–12)
NEUTROPHILS NFR BLD AUTO: 6.63 10*3/MM3 (ref 1.7–7)
NEUTROPHILS NFR BLD AUTO: 80.2 % (ref 42.7–76)
NRBC BLD AUTO-RTO: 0 /100 WBC (ref 0–0.2)
OVALOCYTES BLD QL SMEAR: NORMAL
PLATELET # BLD AUTO: 97 10*3/MM3 (ref 140–450)
PMV BLD AUTO: 10.3 FL (ref 6–12)
POTASSIUM SERPL-SCNC: 3.5 MMOL/L (ref 3.5–5.2)
RBC # BLD AUTO: 3.09 10*6/MM3 (ref 3.77–5.28)
SMALL PLATELETS BLD QL SMEAR: NORMAL
SODIUM SERPL-SCNC: 139 MMOL/L (ref 136–145)
WBC MORPH BLD: NORMAL
WBC NRBC COR # BLD AUTO: 8.27 10*3/MM3 (ref 3.4–10.8)

## 2024-06-15 PROCEDURE — 63710000001 ISOSORBIDE DINITRATE 20 MG TABLET: Performed by: SURGERY

## 2024-06-15 PROCEDURE — 63710000001 CETIRIZINE 10 MG TABLET: Performed by: SURGERY

## 2024-06-15 PROCEDURE — 87340 HEPATITIS B SURFACE AG IA: CPT | Performed by: INTERNAL MEDICINE

## 2024-06-15 PROCEDURE — A9270 NON-COVERED ITEM OR SERVICE: HCPCS | Performed by: SURGERY

## 2024-06-15 PROCEDURE — 25010000002 HEPARIN (PORCINE) PER 1000 UNITS: Performed by: INTERNAL MEDICINE

## 2024-06-15 PROCEDURE — 63710000001 PANTOPRAZOLE 40 MG TABLET DELAYED-RELEASE: Performed by: SURGERY

## 2024-06-15 PROCEDURE — 63710000001 CLOPIDOGREL 75 MG TABLET: Performed by: SURGERY

## 2024-06-15 PROCEDURE — 85025 COMPLETE CBC W/AUTO DIFF WBC: CPT | Performed by: SURGERY

## 2024-06-15 PROCEDURE — 63710000001 SEVELAMER 800 MG TABLET: Performed by: SURGERY

## 2024-06-15 PROCEDURE — 63710000001 ASPIRIN 81 MG TABLET DELAYED-RELEASE: Performed by: SURGERY

## 2024-06-15 PROCEDURE — 85007 BL SMEAR W/DIFF WBC COUNT: CPT | Performed by: SURGERY

## 2024-06-15 PROCEDURE — 94799 UNLISTED PULMONARY SVC/PX: CPT

## 2024-06-15 PROCEDURE — 63710000001 ACETAMINOPHEN 325 MG TABLET: Performed by: SURGERY

## 2024-06-15 PROCEDURE — 63710000001 LOSARTAN 50 MG TABLET: Performed by: SURGERY

## 2024-06-15 PROCEDURE — 94761 N-INVAS EAR/PLS OXIMETRY MLT: CPT

## 2024-06-15 PROCEDURE — 80048 BASIC METABOLIC PNL TOTAL CA: CPT | Performed by: SURGERY

## 2024-06-15 PROCEDURE — 63710000001 LINAGLIPTIN 5 MG TABLET: Performed by: SURGERY

## 2024-06-15 PROCEDURE — 63710000001 FUROSEMIDE 20 MG TABLET: Performed by: SURGERY

## 2024-06-15 RX ORDER — CLOPIDOGREL BISULFATE 75 MG/1
75 TABLET ORAL DAILY
Qty: 90 TABLET | Refills: 3 | Status: SHIPPED | OUTPATIENT
Start: 2024-06-15 | End: 2025-06-15

## 2024-06-15 RX ORDER — HEPARIN SODIUM 1000 [USP'U]/ML
3400 INJECTION, SOLUTION INTRAVENOUS; SUBCUTANEOUS AS NEEDED
Status: DISCONTINUED | OUTPATIENT
Start: 2024-06-15 | End: 2024-06-15 | Stop reason: HOSPADM

## 2024-06-15 RX ADMIN — LOSARTAN POTASSIUM 100 MG: 50 TABLET, FILM COATED ORAL at 13:29

## 2024-06-15 RX ADMIN — LINAGLIPTIN 5 MG: 5 TABLET, FILM COATED ORAL at 13:30

## 2024-06-15 RX ADMIN — FUROSEMIDE 20 MG: 20 TABLET ORAL at 13:28

## 2024-06-15 RX ADMIN — ASPIRIN 81 MG: 81 TABLET, COATED ORAL at 13:30

## 2024-06-15 RX ADMIN — ACETAMINOPHEN 650 MG: 325 TABLET ORAL at 05:17

## 2024-06-15 RX ADMIN — IPRATROPIUM BROMIDE AND ALBUTEROL SULFATE 3 ML: .5; 3 SOLUTION RESPIRATORY (INHALATION) at 14:19

## 2024-06-15 RX ADMIN — HEPARIN SODIUM 3400 UNITS: 1000 INJECTION INTRAVENOUS; SUBCUTANEOUS at 12:23

## 2024-06-15 RX ADMIN — IPRATROPIUM BROMIDE AND ALBUTEROL SULFATE 3 ML: .5; 3 SOLUTION RESPIRATORY (INHALATION) at 04:07

## 2024-06-15 RX ADMIN — SEVELAMER CARBONATE 800 MG: 800 TABLET, FILM COATED ORAL at 13:30

## 2024-06-15 RX ADMIN — CETIRIZINE HYDROCHLORIDE 10 MG: 10 TABLET, FILM COATED ORAL at 13:29

## 2024-06-15 RX ADMIN — PANTOPRAZOLE SODIUM 40 MG: 40 TABLET, DELAYED RELEASE ORAL at 05:17

## 2024-06-15 RX ADMIN — CLOPIDOGREL BISULFATE 75 MG: 75 TABLET ORAL at 13:29

## 2024-06-15 RX ADMIN — ACETAMINOPHEN 650 MG: 325 TABLET ORAL at 13:38

## 2024-06-15 RX ADMIN — ISOSORBIDE DINITRATE 20 MG: 20 TABLET ORAL at 13:38

## 2024-06-15 NOTE — DISCHARGE SUMMARY
[facility]         DISCHARGE SUMMARY    Patient Name: Charlette Rai  : 1935  MRN: [id number]    Date of Admission: 2024  Date of Discharge:  6/15/2024  Primary Care Physician: Brennan Mosqueda MD    Consults       Date and Time Order Name Status Description    2024 12:02 PM Inpatient Pulmonology Consult Completed     2024 12:02 PM Inpatient Nephrology Consult Completed             Presenting Problem:   Renal artery stenosis [I70.1]  S/P renal artery angioplasty [Z98.62]  Atherosclerosis of renal artery [I70.1]    Active and Resolved Hospital Problems:  Active Hospital Problems    Diagnosis POA    **Renal artery stenosis [I70.1] Unknown    S/P renal artery angioplasty [Z98.62] Not Applicable    Atherosclerosis of renal artery [I70.1] Yes      Resolved Hospital Problems   No resolved problems to display.         Hospital Course     Hospital Course:  Charlette Rai is a 89 y.o. female who was admitted after left renal stent placement via right radial approach.  She had had multiple hypertensive emergency episodes and severe left renal stenosis.  She underwent the procedure uneventfully.  Mild right arm hematoma.  This was observed.  She did well overnight.  Was able to be dialyzed through the catheter.  Deemed suitable for discharge.  Blood pressure is better controlled.      Lower extremity duplex was performed because of edema.  No DVTs noted.      Day of Discharge     Vital Signs:  Temp:  [97.2 °F (36.2 °C)-98.1 °F (36.7 °C)] 97.3 °F (36.3 °C)  Heart Rate:  [68-76] 73  Resp:  [16-20] 16  BP: (116-160)/(45-86) 130/65  Flow (L/min):  [2-2.5] 2    Physical Exam:  Right radial access site is clean.  Mild swelling in the arm overall.  Good  strength.    Pertinent  and/or Most Recent Results     LAB RESULTS:      Lab 06/15/24  0512 24  0000   WBC 8.27  --    HEMOGLOBIN 9.5* 10.6*  31.8*   HEMATOCRIT 31.4*  --    PLATELETS 97*  --    NEUTROS ABS 6.63  --     IMMATURE GRANS (ABS) 0.04  --    LYMPHS ABS 0.55*  --    MONOS ABS 0.92*  --    EOS ABS 0.11  --    .6*  --          Lab 06/15/24  0512 06/14/24  1000   SODIUM 139 140   POTASSIUM 3.5 3.7   CHLORIDE 98 98   CO2 33.8* 34.1*   ANION GAP 7.2 7.9   BUN 30* 25*   CREATININE 2.47* 2.16*   EGFR 18.5* 21.7*   GLUCOSE 180* 120*   CALCIUM 8.0* 8.6                         Brief Urine Lab Results  (Last result in the past 365 days)        Color   Clarity   Blood   Leuk Est   Nitrite   Protein   CREAT   Urine HCG        05/01/24 2102 Dark Yellow   Cloudy   Negative   Small (1+)   Negative   >=300 mg/dL (3+)                 Microbiology Results (last 10 days)       ** No results found for the last 240 hours. **            CT Angiogram Abdomen Pelvis    Result Date: 5/31/2024  Impression:  1. High-grade arterial stenoses involve the origins of the celiac trunk, superior mesenteric artery (SMA), and left renal artery.  2. Endovascular arterial stents are in place in the bilateral common iliac arteries and the left external iliac artery. They are patent.  3. 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  4. The right renal subcapsular hematoma has decreased in size since 3/8/2024.  5. Please see above comments for further detail.     Please note that portions of this note were completed with a voice recognition program.       Electronically Signed By-Chicho Jones MD On:5/31/2024 11:38 PM      XR Chest 1 View    Result Date: 5/31/2024  Impression: Improved infiltrates in the right lung.  Electronically Signed By-Dr. Weston Castillo MD On:5/31/2024 5:48 AM      XR Chest 1 View    Result Date: 5/30/2024  Impression: New right basilar infiltrates concerning for pneumonia. The remainder of the chest x-ray is largely stable from prior.  Electronically Signed By-Dr. Weston Castillo MD On:5/30/2024 6:08 AM       Results for orders  placed during the hospital encounter of 06/14/24    Duplex Venous Lower Extremity - Bilateral CV-READ    Interpretation Summary    Normal bilateral lower extremity venous duplex scan.      Results for orders placed during the hospital encounter of 06/14/24    Duplex Venous Lower Extremity - Bilateral CV-READ    Interpretation Summary    Normal bilateral lower extremity venous duplex scan.      Results for orders placed during the hospital encounter of 05/30/24    Adult Transthoracic Echo Complete W/ Cont if Necessary Per Protocol    Interpretation Summary    Left ventricular systolic function is normal. Estimated left ventricular EF = 55%    Left ventricular wall thickness is consistent with mild to moderate concentric hypertrophy. Sigmoid-shaped ventricular septum is present.    Left ventricular diastolic function is consistent with (grade Ia w/high LAP) impaired relaxation.    The left atrial cavity is mildly dilated.    Moderate aortic valve stenosis is present.    Moderate mitral valve stenosis is present.    Estimated right ventricular systolic pressure from tricuspid regurgitation is moderately elevated (45-55 mmHg).      Labs Pending at Discharge:  Pending Labs       Order Current Status    Hepatitis B Surface Antigen In process              Discharge Details        Discharge Medications        New Medications        Instructions Start Date   clopidogrel 75 MG tablet  Commonly known as: Plavix   75 mg, Oral, Daily             Changes to Medications        Instructions Start Date   levocetirizine 5 MG tablet  Commonly known as: XYZAL  What changed: when to take this   TAKE 1 TABLET DAILY             Continue These Medications        Instructions Start Date   acetaminophen 325 MG tablet  Commonly known as: TYLENOL   650 mg, Oral, Every 6 Hours PRN      albuterol sulfate  (90 Base) MCG/ACT inhaler  Commonly known as: PROVENTIL HFA;VENTOLIN HFA;PROAIR HFA   2 puffs, Inhalation, Every 4 Hours PRN       aspirin 81 MG EC tablet   81 mg, Oral, Daily      budesonide 0.5 MG/2ML nebulizer solution  Commonly known as: PULMICORT   0.5 mg, Nebulization, Daily - RT      carvedilol 12.5 MG tablet  Commonly known as: COREG   12.5 mg, Oral, 2 Times Daily With Meals      dexlansoprazole 60 MG capsule  Commonly known as: DEXILANT   60 mg, Oral, Daily      furosemide 20 MG tablet  Commonly known as: LASIX   20 mg, Oral, Daily      ipratropium-albuterol 0.5-2.5 mg/3 ml nebulizer  Commonly known as: DUO-NEB   3 mL, Nebulization, Every 4 Hours PRN      isosorbide dinitrate 20 MG tablet  Commonly known as: ISORDIL   20 mg, Oral, 3 Times Daily (Nitrates)      linagliptin 5 MG tablet tablet  Commonly known as: Tradjenta   5 mg, Oral, Daily      losartan 100 MG tablet  Commonly known as: COZAAR   100 mg, Oral, Daily      nitroglycerin 0.4 MG SL tablet  Commonly known as: NITROSTAT   Place 1 tablet under the tongue Every 5 (Five) Minutes As Needed for Chest Pain.      rosuvastatin 20 MG tablet  Commonly known as: CRESTOR   20 mg, Oral, Daily      saccharomyces boulardii 250 MG capsule  Commonly known as: FLORASTOR   250 mg, Oral, 2 Times Daily      sevelamer 800 MG tablet  Commonly known as: RENVELA   800 mg, Oral, 3 Times Daily With Meals      tobramycin  Commonly known as: JUAN   300 mg, Nebulization, 2 Times Daily - RT               No Known Allergies      Discharge Disposition:  Home or Self Care    Diet:    Renal diabetic diet.    Condition: Stable      Discharge Activity:     Ambulate frequently.  Keep legs elevated when not ambulating.    CODE STATUS:  Code Status and Medical Interventions:   Ordered at: 06/14/24 1456     Code Status (Patient has no pulse and is not breathing):    CPR (Attempt to Resuscitate)     Medical Interventions (Patient has pulse or is breathing):    Full Support         Future Appointments   Date Time Provider Department Center   6/18/2024  1:00 PM Halima Quick APRN [facility] None   6/19/2024   9:30 AM Brennan Mosqueda MD [facility]   11/21/2024  3:00 PM León Sanchez MD [facility]             Electronically signed by Dio Miguel MD, 06/15/24, 2:13 PM EDT.

## 2024-06-15 NOTE — CONSULTS
Patient Care Team:  Brennan Mosqueda MD as PCP - General (Internal Medicine)  Regis Mckeon MD as Consulting Physician (Radiation Oncology)  Carlton Casillas MD as Consulting Physician (Gastroenterology)  Susan Marcos APRN as Nurse Practitioner (Gastroenterology)    Chief complaint: ESRD    Subjective     History of Present Illness  88yo with h/o ESRD on dialysis m/w/f at [facility].  She has had recurrent pulm edema felt possibly due to JONAH.  She had stenting to renal artery yesterday and admitted for observation.  We were asked to see her for dialysis needs.  No acute issues today.  Seen on dialysis, she is refusing to do 4hrs, only will do 3hrs.      Review of Systems   Constitutional:  Negative for chills.   Respiratory:  Negative for cough and shortness of breath.    Cardiovascular:  Negative for chest pain and leg swelling.   Musculoskeletal:  Negative for back pain.   Neurological:  Negative for headaches.        Past Medical History:   Diagnosis Date    Allergic rhinitis     Anaphylactic shock, unspecified, initial encounter 12/05/2023    Anemia     Arthritis     Asthma     USE INHALERS AND NEBULIZERS    Back pain     Bladder disorder     Cancer     LEFT LUNG CANCER 2005 SURGERY AND CHEMO DONE  AND CURRENTLY 4/ 14/22  RIGHT LUNG CANCER HAS ONLY RECEIVED RADIATION THUS FAR    Chronic kidney disease     stage 3    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 DENIES CP BUT DOES HAVE SOA CHRONIC ISSUE COPD/LUNG CANCER    COPD (chronic obstructive pulmonary disease)     FOLLOWED BY DR MARIAJOSE BAILEY    Coronary artery disease     DENIES CP BUT DOES GET SOA MOST OF THE TIME WITH EXERTION BUT OCC AT REST CHRONIC ISSUE COPD/LUNG CANCER    Deep vein thrombosis     Diabetes mellitus     DOES NOT CHECK BS DAILY    Disease of thyroid  gland     HYPOTHYROIDISM    Essential hypertension     Gastric ulcer     GERD (gastroesophageal reflux disease)     Heart murmur     History of transfusion     NO ISSUES POST TRANSFUSION WAS MANY YEARS AGO    Hyperlipemia     Leukocytopenia     FOLLOWED BY DR MIR BAILEY    Limb swelling     Lumbago     Lumbar spinal stenosis     Lung cancer     Migraine headache     Multiple joint pain     On home oxygen therapy     3L/NC PRN    Osteopenia     PNA (pneumonia) 05/04/2024    Pseudomonas pneumonia 04/29/2024    Reflux esophagitis     Shortness of breath     Thyroid nodule     HAS ULTRASOUND YEARLY BEING MONITORED    Vascular disease     Vitamin D deficiency    ,   Past Surgical History:   Procedure Laterality Date    ABDOMINAL SURGERY      ANGIOPLASTY RENAL ARTERY Left 06/14/2024    stent placement    APPENDECTOMY      ARTERIOVENOUS FISTULA/SHUNT SURGERY Left 05/10/2022    Procedure: Left basilic vein transposition;  Surgeon: Wan Barksdale MD;  Location: [facility] MAIN OR;  Service: Vascular;  Laterality: Left;    ARTERIOVENOUS FISTULA/SHUNT SURGERY Left 03/23/2023    Procedure: Ligation of left arm arteriovenous fistula;  Surgeon: Wan Barksdale MD;  Location: [facility] MAIN OR;  Service: Vascular;  Laterality: Left;    ARTERIOVENOUS FISTULA/SHUNT SURGERY Left 11/30/2023    Procedure: Creation of left arm arteriovenous graft;  Surgeon: Wan Barksdale MD;  Location: [facility] MAIN OR;  Service: Vascular;  Laterality: Left;    BRONCHOSCOPY N/A 04/24/2024    Procedure: BRONCHOSCOPY WITH BAL AND WASHINGS;  Surgeon: Khalif Yanez MD;  Location: [facility] ENDOSCOPY;  Service: Pulmonary;  Laterality: N/A;  MUCUS PLUGGING    CARDIAC CATHETERIZATION  1996    CARDIAC SURGERY      CARDIAC SURGERY      fluid drained from heart    CATARACT EXTRACTION, BILATERAL  2003    COLONOSCOPY  2014    ENDOSCOPY  2016    2019    FEMORAL ARTERY STENT Bilateral     HYSTERECTOMY      LUNG BIOPSY Left 2005    lobectomy upper lung caner    LUNG VOLUME  REDUCTION      OTHER SURGICAL HISTORY      artifical joints/limbs    REPLACEMENT TOTAL KNEE Left 2016    UPPER GASTROINTESTINAL ENDOSCOPY     ,   Family History   Problem Relation Age of Onset    Arthritis Mother     Arthritis Father     Cancer Brother     Diabetes Brother     Other Brother         blood disease     Prostate cancer Brother     Cancer Brother     Prostate cancer Brother     Cancer Brother     Cancer Brother     Malig Hyperthermia Neg Hx     Colon cancer Neg Hx    ,   Social History     Socioeconomic History    Marital status:    Tobacco Use    Smoking status: Former     Current packs/day: 0.00     Average packs/day: 1 pack/day for 52.5 years (52.5 ttl pk-yrs)     Types: Cigarettes     Start date:      Quit date: 2004     Years since quittin.9     Passive exposure: Past    Smokeless tobacco: Never   Vaping Use    Vaping status: Never Used   Substance and Sexual Activity    Alcohol use: Never    Drug use: Never    Sexual activity: Defer     E-cigarette/Vaping    E-cigarette/Vaping Use Never User      E-cigarette/Vaping Substances    Nicotine No     THC No     CBD No     Flavoring No      E-cigarette/Vaping Devices    Disposable No     Pre-filled or Refillable Cartridge No     Refillable Tank No     Pre-filled Pod No          ,   Medications Prior to Admission   Medication Sig Dispense Refill Last Dose    acetaminophen (TYLENOL) 325 MG tablet Take 2 tablets by mouth Every 6 (Six) Hours As Needed for Mild Pain.   Past Week    albuterol sulfate  (90 Base) MCG/ACT inhaler Inhale 2 puffs Every 4 (Four) Hours As Needed for Wheezing.   2024    aspirin 81 MG EC tablet Take 1 tablet by mouth Daily.   2024    budesonide (PULMICORT) 0.5 MG/2ML nebulizer solution Take 2 mL by nebulization Daily.   2024    carvedilol (COREG) 12.5 MG tablet Take 1 tablet by mouth 2 (Two) Times a Day With Meals for 30 days. 60 tablet 0 2024 at 1930    dexlansoprazole (DEXILANT) 60 MG  capsule Take 1 capsule by mouth Daily.   6/13/2024    furosemide (LASIX) 20 MG tablet Take 1 tablet by mouth Daily.   6/13/2024    ipratropium-albuterol (DUO-NEB) 0.5-2.5 mg/3 ml nebulizer Take 3 mL by nebulization Every 4 (Four) Hours As Needed for Shortness of Air or Wheezing for up to 30 days. 360 mL 2 6/14/2024    isosorbide dinitrate (ISORDIL) 20 MG tablet Take 1 tablet by mouth 3 (Three) Times a Day for 30 days. 90 tablet 0 6/13/2024    levocetirizine (XYZAL) 5 MG tablet TAKE 1 TABLET DAILY (Patient taking differently: Take 1 tablet by mouth Every Evening.) 90 tablet 1 6/13/2024    linagliptin (Tradjenta) 5 MG tablet tablet Take 1 tablet by mouth Daily. 90 tablet 1 6/13/2024    losartan (COZAAR) 100 MG tablet Take 1 tablet by mouth Daily for 30 days. 30 tablet 0 6/13/2024    rosuvastatin (CRESTOR) 20 MG tablet Take 1 tablet by mouth Daily. 90 tablet 3 6/13/2024    sevelamer (RENVELA) 800 MG tablet Take 1 tablet by mouth 3 (Three) Times a Day With Meals.   6/13/2024    tobramycin (JUAN) Take 5 mL by nebulization 2 (Two) Times a Day.   6/13/2024    nitroglycerin (NITROSTAT) 0.4 MG SL tablet Place 1 tablet under the tongue Every 5 (Five) Minutes As Needed for Chest Pain.   More than a month    saccharomyces boulardii (FLORASTOR) 250 MG capsule Take 1 capsule by mouth 2 (Two) Times a Day.   More than a month   , Scheduled Meds:  aspirin, 81 mg, Oral, Daily  budesonide, 0.5 mg, Nebulization, Daily - RT  carvedilol, 12.5 mg, Oral, BID With Meals  cetirizine, 10 mg, Oral, Daily  clopidogrel, 75 mg, Oral, Daily  furosemide, 20 mg, Oral, Daily  isosorbide dinitrate, 20 mg, Oral, TID - Nitrates  linagliptin, 5 mg, Oral, Daily  losartan, 100 mg, Oral, Daily  pantoprazole, 40 mg, Oral, Q AM  rosuvastatin, 10 mg, Oral, Nightly  saccharomyces boulardii, 250 mg, Oral, BID  sevelamer, 800 mg, Oral, TID With Meals   , Continuous Infusions:   , PRN Meds:    acetaminophen    albuterol    ipratropium-albuterol    nitroglycerin,  "and Allergies:  Patient has no known allergies.    Objective     Vital Signs  Temp:  [97.2 °F (36.2 °C)-98.1 °F (36.7 °C)] 97.2 °F (36.2 °C)  Heart Rate:  [68-76] 76  Resp:  [18-20] 18  BP: (128-186)/(38-82) 141/78    No intake/output data recorded.  I/O last 3 completed shifts:  In: 240 [P.O.:240]  Out: 325 [Urine:325]    Physical Exam  HENT:      Nose: Nose normal.      Mouth/Throat:      Mouth: Mucous membranes are moist.   Eyes:      General: No scleral icterus.     Pupils: Pupils are equal, round, and reactive to light.   Cardiovascular:      Rate and Rhythm: Normal rate and regular rhythm.      Pulses: Normal pulses.   Pulmonary:      Effort: Pulmonary effort is normal.      Breath sounds: Normal breath sounds.   Abdominal:      General: Abdomen is flat.   Musculoskeletal:      Right lower leg: No edema.      Left lower leg: No edema.   Neurological:      General: No focal deficit present.      Mental Status: She is alert.         Results Review:    I reviewed the patient's new clinical results.  I reviewed the patient's new imaging results and agree with the interpretation.  I reviewed the patient's other test results and agree with the interpretation    WBC WBC   Date Value Ref Range Status   06/15/2024 8.27 3.40 - 10.80 10*3/mm3 Final      HGB Hemoglobin   Date Value Ref Range Status   06/15/2024 9.5 (L) 12.0 - 15.9 g/dL Final      HCT Hematocrit   Date Value Ref Range Status   06/15/2024 31.4 (L) 34.0 - 46.6 % Final      Platlets No results found for: \"LABPLAT\"   MCV MCV   Date Value Ref Range Status   06/15/2024 101.6 (H) 79.0 - 97.0 fL Final          Sodium Sodium   Date Value Ref Range Status   06/15/2024 139 136 - 145 mmol/L Final   06/14/2024 140 136 - 145 mmol/L Final      Potassium Potassium   Date Value Ref Range Status   06/15/2024 3.5 3.5 - 5.2 mmol/L Final   06/14/2024 3.7 3.5 - 5.2 mmol/L Final      Chloride Chloride   Date Value Ref Range Status   06/15/2024 98 98 - 107 mmol/L Final " "  06/14/2024 98 98 - 107 mmol/L Final      CO2 CO2   Date Value Ref Range Status   06/15/2024 33.8 (H) 22.0 - 29.0 mmol/L Final   06/14/2024 34.1 (H) 22.0 - 29.0 mmol/L Final      BUN BUN   Date Value Ref Range Status   06/15/2024 30 (H) 8 - 23 mg/dL Final   06/14/2024 25 (H) 8 - 23 mg/dL Final      Creatinine Creatinine   Date Value Ref Range Status   06/15/2024 2.47 (H) 0.57 - 1.00 mg/dL Final   06/14/2024 2.16 (H) 0.57 - 1.00 mg/dL Final      Calcium Calcium   Date Value Ref Range Status   06/15/2024 8.0 (L) 8.6 - 10.5 mg/dL Final   06/14/2024 8.6 8.6 - 10.5 mg/dL Final      PO4 No results found for: \"CAPO4\"   Albumin No results found for: \"ALBUMIN\"   Magnesium No results found for: \"MG\"   Uric Acid No results found for: \"URICACID\"         Assessment & Plan       Renal artery stenosis    S/P renal artery angioplasty    Atherosclerosis of renal artery      Assessment & Plan  ESRD-  Pt with dialysis m/w/f outpt.  Had renal artery stenting yeserday and plans for dialysis today.  D/w her to try and stay for 4 hrs to help ith volume removal.  OK for d/c from renal standpoint after dialysis today.  Continue reg schedule otherwise.    HTN- up, probably needs better UF with longer treatment times but appears resistant to this.    Anemia of CKD-  close to goal.  Treated at chronic unit.    PVD-      JONAH-  s/p left renal artery angioplasty and stent.    I discussed the patients findings and my recommendations with patient, nursing staff, and consulting provider    Elliott Aviles MD  06/15/24  08:12 EDT          "

## 2024-06-15 NOTE — OUTREACH NOTE
COPD/PN Week 2 Survey      Flowsheet Row Responses   [facility] facility patient discharged from? [facility]   Does the patient have one of the following disease processes/diagnoses(primary or secondary)? COPD   Week 2 attempt successful? No   Unsuccessful attempts Attempt 1   Revoke Readmitted            LITO HART - Registered Nurse

## 2024-06-15 NOTE — PROGRESS NOTES
[facility]     Progress Note    Patient Name: Charlette Rai  : 1935  MRN: [id number]  Primary Care Physician:  Brennan Mosqueda MD  Date of admission: 2024    Subjective   Subjective     Patient stable overnight.  Wants to go home.  Underwent dialysis this morning uneventfully.    Objective   Objective     Vitals:   Temp:  [97.2 °F (36.2 °C)-98.1 °F (36.7 °C)] 97.3 °F (36.3 °C)  Heart Rate:  [68-76] 73  Resp:  [16-20] 16  BP: (116-160)/(45-86) 130/65  Flow (L/min):  [2-2.5] 2    Labs     Results from last 7 days   Lab Units 06/15/24  0512 24  0000   WBC 10*3/mm3 8.27  --    HEMOGLOBIN g/dL 9.5* 10.6*  31.8*   HEMATOCRIT % 31.4*  --    PLATELETS 10*3/mm3 97*  --          Results from last 7 days   Lab Units 06/15/24  0512 24  1000   CREATININE mg/dL 2.47* 2.16*                    Lab Results   Component Value Date    LDL 36 2023              Physical Exam   Blood pressure ranges better.  But this could be related to dialysis also.  Will need to monitor closely.  Right radial access site is clean.  No hematoma.        Assessment & Plan   Assessment / Plan     Active Hospital Problems:  Active Hospital Problems    Diagnosis     **Renal artery stenosis     S/P renal artery angioplasty     Atherosclerosis of renal artery          Assessment/Plan:    Doing well after left renal stent from radial approach.  Will DC today.  Follow-up in the clinic.  Dual antiplatelet and statin.  Strict glucose control.  Will discuss with nephrology once we have a better idea on her blood pressure profile on outpatient basis regarding further steps.    Electronically signed by Dio Miguel MD, 06/15/24, 2:09 PM EDT.

## 2024-06-15 NOTE — NURSING NOTE
Duration of Treatment 3.5 Hours   Access Site AVF   Dialyzer Revaclear    mL/min   Dialysate Temperature (C) 36   BFR-As tolerated to a maximum of: 400 mL/min   Dialysate Solution Bath: K+ = 3 mEq, CA = 2.5mg   Bicarb 35 mEq   Na+ 137 meq   Fluid Removal: 2L     Patient completed 3 hour treatment and removed 2L. Patient requested 3 hour treatment instead of 3.5 and MD agreed. Removed 2L and BLP 72. Post /65.    Report given to primary RNLuh.

## 2024-06-15 NOTE — DISCHARGE INSTRUCTIONS
Keep right arm elevated and wrapped for the next 2 to 3 days to help with edema.  Keep a blood pressure diary.  Monitor diet for strict glucose control.  Incentive spirometry at home.

## 2024-06-17 ENCOUNTER — TRANSCRIBE ORDERS (OUTPATIENT)
Dept: VASCULAR SURGERY | Facility: HOSPITAL | Age: 89
End: 2024-06-17
Payer: MEDICARE

## 2024-06-17 DIAGNOSIS — I70.1 RENAL ARTERY STENOSIS: Primary | ICD-10-CM

## 2024-06-18 ENCOUNTER — OFFICE VISIT (OUTPATIENT)
Dept: CARDIOLOGY | Facility: CLINIC | Age: 89
End: 2024-06-18
Payer: MEDICARE

## 2024-06-18 VITALS
BODY MASS INDEX: 24.72 KG/M2 | HEART RATE: 60 BPM | WEIGHT: 144 LBS | SYSTOLIC BLOOD PRESSURE: 130 MMHG | DIASTOLIC BLOOD PRESSURE: 58 MMHG

## 2024-06-18 DIAGNOSIS — E78.5 HYPERLIPIDEMIA LDL GOAL <70: ICD-10-CM

## 2024-06-18 DIAGNOSIS — I50.33 ACUTE ON CHRONIC HEART FAILURE WITH PRESERVED EJECTION FRACTION (HFPEF): Primary | ICD-10-CM

## 2024-06-18 DIAGNOSIS — I10 ESSENTIAL HYPERTENSION: ICD-10-CM

## 2024-06-18 DIAGNOSIS — N18.4 STAGE 4 CHRONIC KIDNEY DISEASE: ICD-10-CM

## 2024-06-18 PROCEDURE — 1159F MED LIST DOCD IN RCRD: CPT

## 2024-06-18 PROCEDURE — 99215 OFFICE O/P EST HI 40 MIN: CPT

## 2024-06-18 PROCEDURE — 1160F RVW MEDS BY RX/DR IN RCRD: CPT

## 2024-06-18 RX ORDER — SPIRONOLACTONE 25 MG/1
12.5 TABLET ORAL EVERY OTHER DAY
Qty: 45 TABLET | Refills: 3 | Status: SHIPPED | OUTPATIENT
Start: 2024-06-18

## 2024-06-18 RX ORDER — FUROSEMIDE 20 MG/1
20 TABLET ORAL DAILY
Qty: 90 TABLET | Refills: 3 | Status: SHIPPED | OUTPATIENT
Start: 2024-06-18

## 2024-06-18 NOTE — ASSESSMENT & PLAN NOTE
Ms. Rai has stage IV chronic kidney disease and does hemodialysis Monday Wednesday and Friday with an occasional Saturday to help control her all volume.  She does make urine and utilizes Lasix without any adverse effects.  Will continue Lasix and trial her on spironolactone 25 mg a half a tablet every other day to help with her volume overload.

## 2024-06-18 NOTE — PATIENT INSTRUCTIONS
1.  Continue Lasix 20 mg daily.  2.  Start spironolactone 25 mg take a half a tablet every other day.  3.  Do heart rate blood pressure and weight log and bring it to next appointment.  4.  Do blood work 1 to 2 days prior to next visit

## 2024-06-18 NOTE — PROGRESS NOTES
New Patient Office Visit    Chief Complaint  Acute Heart failure     Subjective            Charlette Rai presents to [facility] CARDIOLOGY  History of Present Illness  Ms. Rai is an 89-year-old female that presented to the office today for her initial evaluation due to new onset heart failure with preserved ejection fraction who has a history of lung cancer s/p resection and chemotherapy, COPD, CAD end-stage renal on hemodialysis and hypertension.  She was recently admitted to the hospital for short of air and volume overload.  Patient reports that she has experienced bilateral lower extremity edema for years and approximately 8 years ago was admitted for fluid around her heart.  She continues to report short of air with minimal activity and has been using her oxygen much more frequently.  She does have bilateral lower extremity edema.  She does have orthopnea and utilizes multiple pillows at night.  She does hemodialysis at least 3-4 times weekly to help with the volume overload.  She has been on Lasix 20 mg but is currently out.  Ms. Rai does have a history of her father passed away from heart failure.  She does have aortic stenosis.  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  I will check to see if she is able to perform a PYP scan due to her kidney function.  She denies any chest pain, dizziness, potation's or syncopal episodes.      Past Medical History:   Diagnosis Date    Allergic rhinitis     Anaphylactic shock, unspecified, initial encounter 12/05/2023    Anemia     Arthritis     Asthma     USE INHALERS AND NEBULIZERS    Back pain     Bladder disorder     Cancer     LEFT LUNG CANCER 2005 SURGERY AND CHEMO DONE  AND CURRENTLY 4/ 14/22  RIGHT LUNG CANCER HAS ONLY RECEIVED RADIATION THUS FAR    Chronic kidney disease     stage 3    CKD (chronic kidney disease) requiring chronic dialysis     CKD (chronic kidney disease) stage 4, GFR 15-29 ml/min     Condition not found     ulcer     Congestive heart failure (CHF)     FOLLOWED BY DR AQUINO. DENIES CP BUT DOES HAVE SOA CHRONIC ISSUE COPD/LUNG CANCER    COPD (chronic obstructive pulmonary disease)     FOLLOWED BY DR MARIAJOSE BAILEY    Coronary artery disease     DENIES CP BUT DOES GET SOA MOST OF THE TIME WITH EXERTION BUT OCC AT REST CHRONIC ISSUE COPD/LUNG CANCER    Deep vein thrombosis     Diabetes mellitus     DOES NOT CHECK BS DAILY    Disease of thyroid gland     HYPOTHYROIDISM    Essential hypertension     Gastric ulcer     GERD (gastroesophageal reflux disease)     Heart murmur     History of transfusion     NO ISSUES POST TRANSFUSION WAS MANY YEARS AGO    Hyperlipemia     Leukocytopenia     FOLLOWED BY DR MIR BAILEY    Limb swelling     Lumbago     Lumbar spinal stenosis     Lung cancer     Migraine headache     Multiple joint pain     On home oxygen therapy     3L/NC PRN    Osteopenia     PNA (pneumonia) 05/04/2024    Pseudomonas pneumonia 04/29/2024    Reflux esophagitis     Shortness of breath     Thyroid nodule     HAS ULTRASOUND YEARLY BEING MONITORED    Vascular disease     Vitamin D deficiency        No Known Allergies     Past Surgical History:   Procedure Laterality Date    ABDOMINAL SURGERY      ANGIOPLASTY RENAL ARTERY Left 06/14/2024    stent placement    APPENDECTOMY      ARTERIOGRAM N/A 6/14/2024    Procedure: Arteriogram, right radial access, aortogram and renal arteriogram with possible intervention.;  Surgeon: Dio Miguel MD;  Location: [facility] CATH INVASIVE LOCATION;  Service: Peripheral Vascular;  Laterality: N/A;    ARTERIOVENOUS FISTULA/SHUNT SURGERY Left 05/10/2022    Procedure: Left basilic vein transposition;  Surgeon: Wan Barksdale MD;  Location: [facility] MAIN OR;  Service: Vascular;  Laterality: Left;    ARTERIOVENOUS FISTULA/SHUNT SURGERY Left 03/23/2023    Procedure: Ligation of left arm arteriovenous fistula;  Surgeon: Wan Barksdale MD;  Location: [facility] MAIN OR;  Service: Vascular;  Laterality: Left;     ARTERIOVENOUS FISTULA/SHUNT SURGERY Left 2023    Procedure: Creation of left arm arteriovenous graft;  Surgeon: Wan Barksdale MD;  Location: [facility] MAIN OR;  Service: Vascular;  Laterality: Left;    BRONCHOSCOPY N/A 2024    Procedure: BRONCHOSCOPY WITH BAL AND WASHINGS;  Surgeon: Khalif Yanez MD;  Location: [facility] ENDOSCOPY;  Service: Pulmonary;  Laterality: N/A;  MUCUS PLUGGING    CARDIAC CATHETERIZATION      CARDIAC SURGERY      CARDIAC SURGERY      fluid drained from heart    CATARACT EXTRACTION, BILATERAL      COLONOSCOPY      ENDOSCOPY  2016    FEMORAL ARTERY STENT Bilateral     HYSTERECTOMY      LUNG BIOPSY Left 2005    lobectomy upper lung caner    LUNG VOLUME REDUCTION      OTHER SURGICAL HISTORY      artifical joints/limbs    REPLACEMENT TOTAL KNEE Left     UPPER GASTROINTESTINAL ENDOSCOPY          Social History     Tobacco Use    Smoking status: Former     Current packs/day: 0.00     Average packs/day: 1 pack/day for 52.5 years (52.5 ttl pk-yrs)     Types: Cigarettes     Start date:      Quit date: 2004     Years since quittin.0     Passive exposure: Past    Smokeless tobacco: Never   Vaping Use    Vaping status: Never Used   Substance Use Topics    Alcohol use: Never    Drug use: Never       Family History   Problem Relation Age of Onset    Arthritis Mother     Arthritis Father     Cancer Brother     Diabetes Brother     Other Brother         blood disease     Prostate cancer Brother     Cancer Brother     Prostate cancer Brother     Cancer Brother     Cancer Brother     Malig Hyperthermia Neg Hx     Colon cancer Neg Hx         Prior to Admission medications    Medication Sig Start Date End Date Taking? Authorizing Provider   acetaminophen (TYLENOL) 325 MG tablet Take 2 tablets by mouth Every 6 (Six) Hours As Needed for Mild Pain.   Yes Provider, MD Sarah   albuterol sulfate  (90 Base) MCG/ACT inhaler Inhale 2 puffs Every 4 (Four) Hours  As Needed for Wheezing.   Yes Sarah Parker MD   aspirin 81 MG EC tablet Take 1 tablet by mouth Daily.   Yes Sarah Parker MD   budesonide (PULMICORT) 0.5 MG/2ML nebulizer solution Take 2 mL by nebulization Daily.   Yes Sarah Parker MD   carvedilol (COREG) 12.5 MG tablet Take 1 tablet by mouth 2 (Two) Times a Day With Meals for 30 days. 5/7/24 6/18/24 Yes Rafael Lyons MD   clopidogrel (Plavix) 75 MG tablet Take 1 tablet by mouth Daily. 6/15/24 6/15/25 Yes Dio Miguel MD   dexlansoprazole (DEXILANT) 60 MG capsule Take 1 capsule by mouth Daily. 9/23/22  Yes Sarah Parker MD   furosemide (LASIX) 20 MG tablet Take 1 tablet by mouth Daily.   Yes Sarah Parker MD   ipratropium-albuterol (DUO-NEB) 0.5-2.5 mg/3 ml nebulizer Take 3 mL by nebulization Every 4 (Four) Hours As Needed for Shortness of Air or Wheezing for up to 30 days. 4/29/24 6/18/24 Yes Rafael Lyons MD   isosorbide dinitrate (ISORDIL) 20 MG tablet Take 1 tablet by mouth 3 (Three) Times a Day for 30 days. 4/29/24 6/18/24 Yes Rafael Lyons MD   levocetirizine (XYZAL) 5 MG tablet TAKE 1 TABLET DAILY  Patient taking differently: Take 1 tablet by mouth Every Evening. 6/23/23  Yes Paloma James MD   linagliptin (Tradjenta) 5 MG tablet tablet Take 1 tablet by mouth Daily. 1/10/22  Yes Paloma James MD   losartan (COZAAR) 100 MG tablet Take 1 tablet by mouth Daily for 30 days. 6/4/24 7/4/24 Yes Pieter Salgado MD   nitroglycerin (NITROSTAT) 0.4 MG SL tablet Place 1 tablet under the tongue Every 5 (Five) Minutes As Needed for Chest Pain.   Yes Sarah Parker MD   rosuvastatin (CRESTOR) 20 MG tablet Take 1 tablet by mouth Daily. 7/7/23  Yes León Sanchez MD   saccharomyces boulardii (FLORASTOR) 250 MG capsule Take 1 capsule by mouth 2 (Two) Times a Day.   Yes Provider, MD Sarah   sevelamer (RENVELA) 800 MG tablet Take 1 tablet by mouth 3 (Three) Times a Day With Meals. 10/9/23  Yes Provider,  MD Sarah   tobramycin (JUAN) Take 5 mL by nebulization 2 (Two) Times a Day.   Yes Provider, MD Sarah        Review of Systems   Constitutional:  Negative for fatigue, unexpected weight gain and unexpected weight loss.   HENT:  Positive for hearing loss. Negative for sinus pressure and swollen glands.    Eyes:  Negative for blurred vision and double vision.   Respiratory:  Positive for shortness of breath. Negative for cough and wheezing.    Cardiovascular:  Positive for leg swelling. Negative for chest pain and palpitations.   Gastrointestinal:  Positive for nausea. Negative for vomiting.   Endocrine: Negative for cold intolerance, heat intolerance, polydipsia and polyuria.   Musculoskeletal:  Positive for back pain. Negative for arthralgias.   Neurological:  Negative for dizziness, light-headedness and headache.   Hematological:  Bruises/bleeds easily.        Symptom Course: Improved    Weight Trend: Stable     Objective     /58   Pulse 60   Wt 65.3 kg (144 lb)   BMI 24.72 kg/m²       Physical Exam  Constitutional:       General: She is awake.      Appearance: Normal appearance.   Neck:      Thyroid: No thyromegaly.      Vascular: No carotid bruit or JVD.   Cardiovascular:      Rate and Rhythm: Normal rate and regular rhythm.      Chest Wall: PMI is not displaced.      Pulses: Normal pulses.      Heart sounds: S1 normal and S2 normal. Murmur heard.      No friction rub. No gallop. No S3 or S4 sounds.   Pulmonary:      Effort: Pulmonary effort is normal.      Breath sounds: Normal breath sounds and air entry. No wheezing, rhonchi or rales.   Abdominal:      General: Bowel sounds are normal.      Palpations: Abdomen is soft. There is no mass.      Tenderness: There is no abdominal tenderness.   Musculoskeletal:      Cervical back: Neck supple.      Right lower leg: No edema.      Left lower leg: No edema.   Neurological:      Mental Status: She is alert and oriented to person, place, and time.  "  Psychiatric:         Mood and Affect: Mood normal.         Behavior: Behavior is cooperative.           Result Review :                      Lab Results   Component Value Date    PROBNP 18,671.0 (H) 05/30/2024    PROBNP 7,318.0 (H) 04/21/2024    PROBNP 13,701.0 (H) 04/17/2024     09/09/2019     04/11/2019     CMP          6/4/2024    05:29 6/14/2024    10:00 6/15/2024    05:12   CMP   Glucose 109  120  180    BUN 35  25  30    Creatinine 2.27  2.16  2.47    EGFR 20.4  21.7  18.5    Sodium 136  140  139    Potassium 3.8  3.7  3.5    Chloride 100  98  98    Calcium 8.3  8.6  8.0    BUN/Creatinine Ratio 15.4  11.6  12.1    Anion Gap 9.2  7.9  7.2      CBC w/diff          6/5/2024    00:00 6/12/2024    00:00 6/15/2024    05:12   CBC w/Diff   WBC   8.27    RBC   3.09    Hemoglobin 10.4        31.2     10.6        31.8     9.5    Hematocrit   31.4    MCV   101.6    MCH   30.7    MCHC   30.3    RDW   15.0    Platelets   97    Neutrophil Rel %   80.2    Immature Granulocyte Rel %   0.5    Lymphocyte Rel %   6.7    Monocyte Rel %   11.1    Eosinophil Rel %   1.3    Basophil Rel %   0.2       Details          This result is from an external source.              Lipid Panel          7/26/2023    08:17   Lipid Panel   Total Cholesterol 112    Triglycerides 83    HDL Cholesterol 60    VLDL Cholesterol 16    LDL Cholesterol  36    LDL/HDL Ratio 0.59       Lab Results   Component Value Date    TSH 3.630 05/01/2024    TSH 1.650 10/29/2023    TSH 1.940 08/25/2020      Lab Results   Component Value Date    FREET4 1.34 05/01/2024    FREET4 1.4 08/25/2020    FREET4 1.4 02/26/2020      D-Dimer, Quantitative   Date Value Ref Range Status   03/13/2024 4.30 (H) 0.00 - 0.87 MCGFEU/mL Final     Magnesium   Date Value Ref Range Status   06/04/2024 2.0 1.6 - 2.4 mg/dL Final      No results found for: \"DIGOXIN\"   A1C Last 3 Results          10/30/2023    16:05 4/19/2024    00:00 5/29/2024    00:00   HGBA1C Last 3 Results "   Hemoglobin A1C 7.80  5.7     5.4          Details          This result is from an external source.                  Results for orders placed during the hospital encounter of 05/30/24    Adult Transthoracic Echo Complete W/ Cont if Necessary Per Protocol    Interpretation Summary    Left ventricular systolic function is normal. Estimated left ventricular EF = 55%    Left ventricular wall thickness is consistent with mild to moderate concentric hypertrophy. Sigmoid-shaped ventricular septum is present.    Left ventricular diastolic function is consistent with (grade Ia w/high LAP) impaired relaxation.    The left atrial cavity is mildly dilated.    Moderate aortic valve stenosis is present.    Moderate mitral valve stenosis is present.    Estimated right ventricular systolic pressure from tricuspid regurgitation is moderately elevated (45-55 mmHg).        Assessment and Plan        Diagnoses and all orders for this visit:    1. Acute on chronic heart failure with preserved ejection fraction (HFpEF) (Primary)  Assessment & Plan:  Ms. Mcknight has heart failure with preserved ejection fraction who reports that she is doing a little better since her hospitalization.  She continues to experience short of air and pedal edema although it is much improved.  She is currently out of her Lasix I will send a new prescription.  Her potassium tends to run on the low side.  I will start spironolactone 25 mg at half a tablet every other day and monitor renal function.  I suspect that amyloidosis might be the cause of her diastolic heart failure.  I will check to see if she is able to do a PYP scan with stage IV renal disease.    1.  Continue Lasix 20 mg daily.  2.  Start spironolactone 25 mg take a half a tablet every other day.  3.  Do heart rate blood pressure and weight log and bring it to next appointment.  4.  Do blood work 1 to 2 days prior to next visit    Orders:  -     CBC & Differential; Future  -     Comprehensive  Metabolic Panel; Future  -     proBNP; Future  -     Magnesium; Future  -     25-HydroxyVitamin D LCMS D2+D3; Future    2. Essential hypertension  Assessment & Plan:  Ms. Rai has hypertension that is well-controlled with the current dose of losartan and carvedilol.  Her heart rate tends to run a little on the low side right at 60.  I have asked her to monitor her heart rate blood pressure and daily weights and bring it to her next appointment.      3. Hyperlipidemia LDL goal <70  Assessment & Plan:  Ms. Rai has a history of hyperlipidemia and is on Crestor 20 mg daily.  Will continue the same.      4. Stage 4 chronic kidney disease  Assessment & Plan:  Ms. Rai has stage IV chronic kidney disease and does hemodialysis Monday Wednesday and Friday with an occasional Saturday to help control her all volume.  She does make urine and utilizes Lasix without any adverse effects.  Will continue Lasix and trial her on spironolactone 25 mg a half a tablet every other day to help with her volume overload.    Orders:  -     CBC & Differential; Future  -     Comprehensive Metabolic Panel; Future  -     proBNP; Future  -     Magnesium; Future  -     25-HydroxyVitamin D LCMS D2+D3; Future    Other orders  -     furosemide (LASIX) 20 MG tablet; Take 1 tablet by mouth Daily.  Dispense: 90 tablet; Refill: 3  -     spironolactone (ALDACTONE) 25 MG tablet; Take 0.5 tablets by mouth Every Other Day.  Dispense: 45 tablet; Refill: 3        Education  Charlette Rai 1935 female  who presents for follow-up of congestive heart failure. Counseling was provided to Charlette Rai for the following topics.   Daily log and monitoring of blood pressure, Charlettegerry Rai was instructed to keep a blood pressure log to monitor for trends of normal and possible abnormal blood pressure readings. Charlette Rai was instructed to bring the blood pressure log to next appointment. Charlette Rai was instructed to keep a daily weight log  and to monitor for weight gain. Instructed to notify the physician office if patient experiences a weight gain of 2-3 pounds overnight, or if experiences a weight gain of 5 pounds in a week. Instruction provided to the patient to elevate bilateral lower extremities to help alleviate edema. Instruction provided to patient to decrease salt and fluid intake. Instruction provided on new medications and possible side effects, and when to call the physician office.     Follow Up     Return in about 2 weeks (around 7/2/2024) for with Dr Malhotra.    Patient was given instructions and counseling regarding her condition or for health maintenance advice. Please see specific information pulled into the AVS if appropriate.     Total time spent, 45 minutes.This time includes time spent by me for the following activities:  Reviewing past records including hospitalizations, performing a cardiac focused examination and/or evaluation, educating and counselling the patient and family, independently interpreting results and diagnostic tests and communicating that information to patient and family, documenting information in the medical record, etc.        45 minutes is spent in consultation with patient not including testing    Electronically signed by DAT Dexter, 06/18/24, 3:29 PM EDT.

## 2024-06-18 NOTE — ASSESSMENT & PLAN NOTE
Ms. Rai has hypertension that is well-controlled with the current dose of losartan and carvedilol.  Her heart rate tends to run a little on the low side right at 60.  I have asked her to monitor her heart rate blood pressure and daily weights and bring it to her next appointment.

## 2024-06-18 NOTE — ASSESSMENT & PLAN NOTE
Ms. Mcknight has heart failure with preserved ejection fraction who reports that she is doing a little better since her hospitalization.  She continues to experience short of air and pedal edema although it is much improved.  She is currently out of her Lasix I will send a new prescription.  Her potassium tends to run on the low side.  I will start spironolactone 25 mg at half a tablet every other day and monitor renal function.  I suspect that amyloidosis might be the cause of her diastolic heart failure.  I will check to see if she is able to do a PYP scan with stage IV renal disease.    1.  Continue Lasix 20 mg daily.  2.  Start spironolactone 25 mg take a half a tablet every other day.  3.  Do heart rate blood pressure and weight log and bring it to next appointment.  4.  Do blood work 1 to 2 days prior to next visit

## 2024-06-19 ENCOUNTER — TELEPHONE (OUTPATIENT)
Dept: INTERNAL MEDICINE | Age: 89
End: 2024-06-19

## 2024-06-19 ENCOUNTER — OFFICE VISIT (OUTPATIENT)
Dept: INTERNAL MEDICINE | Age: 89
End: 2024-06-19
Payer: MEDICARE

## 2024-06-19 VITALS
OXYGEN SATURATION: 92 % | SYSTOLIC BLOOD PRESSURE: 169 MMHG | HEART RATE: 82 BPM | DIASTOLIC BLOOD PRESSURE: 57 MMHG | BODY MASS INDEX: 24.59 KG/M2 | TEMPERATURE: 98.2 F | HEIGHT: 64 IN | WEIGHT: 144 LBS

## 2024-06-19 DIAGNOSIS — Z87.891 EX-SMOKER: Primary | ICD-10-CM

## 2024-06-19 DIAGNOSIS — K21.9 GASTROESOPHAGEAL REFLUX DISEASE WITHOUT ESOPHAGITIS: ICD-10-CM

## 2024-06-19 DIAGNOSIS — I25.10 CORONARY ARTERY CALCIFICATION SEEN ON CT SCAN: ICD-10-CM

## 2024-06-19 DIAGNOSIS — M48.062 SPINAL STENOSIS OF LUMBAR REGION WITH NEUROGENIC CLAUDICATION: ICD-10-CM

## 2024-06-19 DIAGNOSIS — G63 POLYNEUROPATHY ASSOCIATED WITH UNDERLYING DISEASE: ICD-10-CM

## 2024-06-19 DIAGNOSIS — G89.29 CHRONIC BILATERAL LOW BACK PAIN WITHOUT SCIATICA: ICD-10-CM

## 2024-06-19 DIAGNOSIS — R54 FRAILTY SYNDROME IN GERIATRIC PATIENT: ICD-10-CM

## 2024-06-19 DIAGNOSIS — I50.33 ACUTE ON CHRONIC HEART FAILURE WITH PRESERVED EJECTION FRACTION (HFPEF): ICD-10-CM

## 2024-06-19 DIAGNOSIS — I10 ESSENTIAL HYPERTENSION: ICD-10-CM

## 2024-06-19 DIAGNOSIS — I35.0 AORTIC STENOSIS, MODERATE: ICD-10-CM

## 2024-06-19 DIAGNOSIS — E11.51 TYPE 2 DIABETES MELLITUS WITH DIABETIC PERIPHERAL ANGIOPATHY WITHOUT GANGRENE, WITHOUT LONG-TERM CURRENT USE OF INSULIN: ICD-10-CM

## 2024-06-19 DIAGNOSIS — E55.9 VITAMIN D DEFICIENCY: ICD-10-CM

## 2024-06-19 DIAGNOSIS — N18.6 ESRD (END STAGE RENAL DISEASE): ICD-10-CM

## 2024-06-19 DIAGNOSIS — Z98.62 S/P RENAL ARTERY ANGIOPLASTY: ICD-10-CM

## 2024-06-19 DIAGNOSIS — J44.0 COPD WITH LOWER RESPIRATORY INFECTION: ICD-10-CM

## 2024-06-19 DIAGNOSIS — E03.8 HYPOTHYROIDISM DUE TO HASHIMOTO'S THYROIDITIS: ICD-10-CM

## 2024-06-19 DIAGNOSIS — M54.50 CHRONIC BILATERAL LOW BACK PAIN WITHOUT SCIATICA: ICD-10-CM

## 2024-06-19 DIAGNOSIS — I73.9 PERIPHERAL VASCULAR DISEASE OF LOWER EXTREMITY: Primary | ICD-10-CM

## 2024-06-19 DIAGNOSIS — I65.29 STENOSIS OF CAROTID ARTERY, UNSPECIFIED LATERALITY: ICD-10-CM

## 2024-06-19 DIAGNOSIS — I73.9 PERIPHERAL VASCULAR DISEASE OF LOWER EXTREMITY: ICD-10-CM

## 2024-06-19 DIAGNOSIS — E06.3 HYPOTHYROIDISM DUE TO HASHIMOTO'S THYROIDITIS: ICD-10-CM

## 2024-06-19 DIAGNOSIS — E78.5 HYPERLIPIDEMIA LDL GOAL <70: ICD-10-CM

## 2024-06-19 PROCEDURE — 99214 OFFICE O/P EST MOD 30 MIN: CPT | Performed by: INTERNAL MEDICINE

## 2024-06-19 PROCEDURE — 1125F AMNT PAIN NOTED PAIN PRSNT: CPT | Performed by: INTERNAL MEDICINE

## 2024-06-19 RX ORDER — BUDESONIDE 0.5 MG/2ML
0.5 INHALANT ORAL
Qty: 60 EACH | Refills: 5 | Status: SHIPPED | OUTPATIENT
Start: 2024-06-19

## 2024-06-19 RX ORDER — HYDROCODONE BITARTRATE AND ACETAMINOPHEN 5; 325 MG/1; MG/1
1 TABLET ORAL EVERY 8 HOURS PRN
Qty: 30 TABLET | Refills: 0 | Status: SHIPPED | OUTPATIENT
Start: 2024-06-19

## 2024-06-19 NOTE — TELEPHONE ENCOUNTER
Patient was seen this morning for low back pain. Was expecting pain medicine to be sent to [facility] in [place]. Please advise.

## 2024-06-19 NOTE — PROGRESS NOTES
CHIEF COMPLAINT/ HPI:  Establish Care (New pt establish care. Pt was recently in the hospital. Pt states pain in back asking for something for pain. Med reconciliation. Pt is asking for the Florastar. Pt is currently on O2 therapy 3.5 L . )  Patient was recently in the hospital May 30 through June 4, 2024, she is a patient of Dr. 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, she had hypertension issues and she is on dialysis, they also described her as having a type II non-ST elevation myocardial infarction and according to her hospital course, she had previous lung cancer with resection and chemo and radiation on the right and the resection and chemo for the left lung cancer, she has been to rehab several times over the past couple months, she went home at 1 point came right back to the hospital within several days due to shortness of breath, she recently had renal artery stenting on the left side as an outpatient with vascular surgery here locally    pleasant 89-year-old female who is very quiet unable to give much history, she is in no distress but she is very weak appearing, she can barely move her arms to my command, she is wearing oxygen, she is keeps pointing to her dialysis catheter, she tells me she has been  for 67 years and lives in [facility] with her , according to her discharge summary she was in our hospital May 1 through May 7, 2024 Hospital course showed she has end-stage renal disease at baseline with chronic anemia chronic kidney disease and lung cancer, she came in with chest discomfort cough weakness and confusion, and she has been admitted several times in the past 3 to 4 months to our hospital, according to the discharge summary she keeps coming back to the hospital with nonspecific complaints and she was diagnosed with Pseudomonas  "pneumonia and has been on oral and IV antibiotics previous, her workup was essentially unremarkable according to the discharge summary but the family could not take care of her anymore she finally agreed to coming into the nursing home,            Objective   Vital Signs  Vitals:    06/19/24 0912   BP: 169/57   Pulse: 82   Temp: 98.2 °F (36.8 °C)   SpO2: 92%   Weight: 65.3 kg (144 lb)   Height: 162.6 cm (64.02\")      Body mass index is 24.71 kg/m².  Review of Systems   Constitutional: Negative.    HENT: Negative.     Eyes: Negative.    Respiratory: Negative.  Positive for shortness of breath.    Cardiovascular: Negative.    Gastrointestinal: Negative.    Endocrine: Negative.    Genitourinary: Negative.    Musculoskeletal: Negative.  Positive for back pain.   Allergic/Immunologic: Negative.    Neurological: Negative.    Hematological: Negative.    Psychiatric/Behavioral: Negative.        Physical Exam patient's handgrip strength symmetric, she has a 9 palpable thrill in the left AV fistula in the upper arm, she has some bruising to the right forearm her lungs are clear posterior with diminished breath sounds cardiac exam reveals a regular rhythm with a 3/6 early to midsystolic murmur, neck is supple her throat shows dry mucosa her lower legs show 1-2+ pedal edema bilateral 1+ pretibial edema on the left ankle and lower leg trace to 1+ on the right, she is alert and oriented x 3 she is wearing her oxygen  Result Review :   Lab Results   Component Value Date    PROBNP 18,671.0 (H) 05/30/2024    PROBNP 7,318.0 (H) 04/21/2024    PROBNP 13,701.0 (H) 04/17/2024     09/09/2019     04/11/2019     CMP          6/4/2024    05:29 6/14/2024    10:00 6/15/2024    05:12   CMP   Glucose 109  120  180    BUN 35  25  30    Creatinine 2.27  2.16  2.47    EGFR 20.4  21.7  18.5    Sodium 136  140  139    Potassium 3.8  3.7  3.5    Chloride 100  98  98    Calcium 8.3  8.6  8.0    BUN/Creatinine Ratio 15.4  11.6  12.1  "   Anion Gap 9.2  7.9  7.2      CBC w/diff          6/5/2024    00:00 6/12/2024    00:00 6/15/2024    05:12   CBC w/Diff   WBC   8.27    RBC   3.09    Hemoglobin 10.4        31.2     10.6        31.8     9.5    Hematocrit   31.4    MCV   101.6    MCH   30.7    MCHC   30.3    RDW   15.0    Platelets   97    Neutrophil Rel %   80.2    Immature Granulocyte Rel %   0.5    Lymphocyte Rel %   6.7    Monocyte Rel %   11.1    Eosinophil Rel %   1.3    Basophil Rel %   0.2       Details          This result is from an external source.              Lipid Panel          7/26/2023    08:17   Lipid Panel   Total Cholesterol 112    Triglycerides 83    HDL Cholesterol 60    VLDL Cholesterol 16    LDL Cholesterol  36    LDL/HDL Ratio 0.59       Lab Results   Component Value Date    TSH 3.630 05/01/2024    TSH 1.650 10/29/2023    TSH 1.940 08/25/2020      Lab Results   Component Value Date    FREET4 1.34 05/01/2024    FREET4 1.4 08/25/2020    FREET4 1.4 02/26/2020      A1C Last 3 Results          10/30/2023    16:05 4/19/2024    00:00 5/29/2024    00:00   HGBA1C Last 3 Results   Hemoglobin A1C 7.80  5.7     5.4          Details          This result is from an external source.                               Visit Diagnoses:    ICD-10-CM ICD-9-CM   1. Ex-smoker  Z87.891 V15.82   2. COPD with lower respiratory infection  J44.0 496     519.8   3. Polyneuropathy associated with underlying disease  G63 357.4   4. Acute on chronic heart failure with preserved ejection fraction (HFpEF)  I50.33 428.23   5. Essential hypertension  I10 401.9   6. Hyperlipidemia LDL goal <70  E78.5 272.4   7. Peripheral vascular disease of lower extremity  I73.9 443.9   8. Coronary artery calcification seen on CT scan  I25.10 414.00   9. S/P renal artery angioplasty  Z98.62 V45.89   10. Gastroesophageal reflux disease without esophagitis  K21.9 530.81   11. Hypothyroidism due to Hashimoto's thyroiditis  E03.8 244.8    E06.3 245.2   12. Stenosis of carotid artery,  unspecified laterality  I65.29 433.10   13. ESRD (end stage renal disease)  N18.6 585.6   14. Type 2 diabetes mellitus with diabetic peripheral angiopathy without gangrene, without long-term current use of insulin  E11.51 250.70     443.81   15. Spinal stenosis of lumbar region with neurogenic claudication  M48.062 724.03   16. Frailty syndrome in geriatric patient  R54 797   17. Vitamin D deficiency  E55.9 268.9   18. Aortic stenosis, moderate  I35.0 424.1   19. Chronic bilateral low back pain without sciatica  M54.50 724.2    G89.29 338.29       Assessment and Plan   Diagnoses and all orders for this visit:    1. Ex-smoker (Primary)  -     CBC & Differential; Future  -     Comprehensive Metabolic Panel; Future  -     Magnesium; Future  -     TSH+Free T4; Future  -     Vitamin B12 anemia; Future  -     Folate anemia; Future  -     Vitamin D,25-Hydroxy; Future  -     Lipid Panel; Future  -     Iron Profile; Future    2. COPD with lower respiratory infection  -     CBC & Differential; Future  -     Comprehensive Metabolic Panel; Future  -     Magnesium; Future  -     TSH+Free T4; Future  -     Vitamin B12 anemia; Future  -     Folate anemia; Future  -     Vitamin D,25-Hydroxy; Future  -     Lipid Panel; Future  -     Iron Profile; Future    3. Polyneuropathy associated with underlying disease  -     CBC & Differential; Future  -     Comprehensive Metabolic Panel; Future  -     Magnesium; Future  -     TSH+Free T4; Future  -     Vitamin B12 anemia; Future  -     Folate anemia; Future  -     Vitamin D,25-Hydroxy; Future  -     Lipid Panel; Future  -     Iron Profile; Future    4. Acute on chronic heart failure with preserved ejection fraction (HFpEF)  -     CBC & Differential; Future  -     Comprehensive Metabolic Panel; Future  -     Magnesium; Future  -     TSH+Free T4; Future  -     Vitamin B12 anemia; Future  -     Folate anemia; Future  -     Vitamin D,25-Hydroxy; Future  -     Lipid Panel; Future  -     Iron  Profile; Future    5. Essential hypertension  -     CBC & Differential; Future  -     Comprehensive Metabolic Panel; Future  -     Magnesium; Future  -     TSH+Free T4; Future  -     Vitamin B12 anemia; Future  -     Folate anemia; Future  -     Vitamin D,25-Hydroxy; Future  -     Lipid Panel; Future  -     Iron Profile; Future    6. Hyperlipidemia LDL goal <70  -     CBC & Differential; Future  -     Comprehensive Metabolic Panel; Future  -     Magnesium; Future  -     TSH+Free T4; Future  -     Vitamin B12 anemia; Future  -     Folate anemia; Future  -     Vitamin D,25-Hydroxy; Future  -     Lipid Panel; Future  -     Iron Profile; Future    7. Peripheral vascular disease of lower extremity  -     CBC & Differential; Future  -     Comprehensive Metabolic Panel; Future  -     Magnesium; Future  -     TSH+Free T4; Future  -     Vitamin B12 anemia; Future  -     Folate anemia; Future  -     Vitamin D,25-Hydroxy; Future  -     Lipid Panel; Future  -     Iron Profile; Future    8. Coronary artery calcification seen on CT scan  -     CBC & Differential; Future  -     Comprehensive Metabolic Panel; Future  -     Magnesium; Future  -     TSH+Free T4; Future  -     Vitamin B12 anemia; Future  -     Folate anemia; Future  -     Vitamin D,25-Hydroxy; Future  -     Lipid Panel; Future  -     Iron Profile; Future    9. S/P renal artery angioplasty  -     CBC & Differential; Future  -     Comprehensive Metabolic Panel; Future  -     Magnesium; Future  -     TSH+Free T4; Future  -     Vitamin B12 anemia; Future  -     Folate anemia; Future  -     Vitamin D,25-Hydroxy; Future  -     Lipid Panel; Future  -     Iron Profile; Future    10. Gastroesophageal reflux disease without esophagitis  -     CBC & Differential; Future  -     Comprehensive Metabolic Panel; Future  -     Magnesium; Future  -     TSH+Free T4; Future  -     Vitamin B12 anemia; Future  -     Folate anemia; Future  -     Vitamin D,25-Hydroxy; Future  -     Lipid  Panel; Future  -     Iron Profile; Future    11. Hypothyroidism due to Hashimoto's thyroiditis  -     CBC & Differential; Future  -     Comprehensive Metabolic Panel; Future  -     Magnesium; Future  -     TSH+Free T4; Future  -     Vitamin B12 anemia; Future  -     Folate anemia; Future  -     Vitamin D,25-Hydroxy; Future  -     Lipid Panel; Future  -     Iron Profile; Future    12. Stenosis of carotid artery, unspecified laterality  -     CBC & Differential; Future  -     Comprehensive Metabolic Panel; Future  -     Magnesium; Future  -     TSH+Free T4; Future  -     Vitamin B12 anemia; Future  -     Folate anemia; Future  -     Vitamin D,25-Hydroxy; Future  -     Lipid Panel; Future  -     Iron Profile; Future    13. ESRD (end stage renal disease)  -     CBC & Differential; Future  -     Comprehensive Metabolic Panel; Future  -     Magnesium; Future  -     TSH+Free T4; Future  -     Vitamin B12 anemia; Future  -     Folate anemia; Future  -     Vitamin D,25-Hydroxy; Future  -     Lipid Panel; Future  -     Iron Profile; Future    14. Type 2 diabetes mellitus with diabetic peripheral angiopathy without gangrene, without long-term current use of insulin  -     CBC & Differential; Future  -     Comprehensive Metabolic Panel; Future  -     Magnesium; Future  -     TSH+Free T4; Future  -     Vitamin B12 anemia; Future  -     Folate anemia; Future  -     Vitamin D,25-Hydroxy; Future  -     Lipid Panel; Future  -     Iron Profile; Future    15. Spinal stenosis of lumbar region with neurogenic claudication  -     CBC & Differential; Future  -     Comprehensive Metabolic Panel; Future  -     Magnesium; Future  -     TSH+Free T4; Future  -     Vitamin B12 anemia; Future  -     Folate anemia; Future  -     Vitamin D,25-Hydroxy; Future  -     Lipid Panel; Future  -     Iron Profile; Future    16. Frailty syndrome in geriatric patient  -     CBC & Differential; Future  -     Comprehensive Metabolic Panel; Future  -      Magnesium; Future  -     TSH+Free T4; Future  -     Vitamin B12 anemia; Future  -     Folate anemia; Future  -     Vitamin D,25-Hydroxy; Future  -     Lipid Panel; Future  -     Iron Profile; Future    17. Vitamin D deficiency  -     CBC & Differential; Future  -     Comprehensive Metabolic Panel; Future  -     Magnesium; Future  -     TSH+Free T4; Future  -     Vitamin B12 anemia; Future  -     Folate anemia; Future  -     Vitamin D,25-Hydroxy; Future  -     Lipid Panel; Future  -     Iron Profile; Future    18. Aortic stenosis, moderate  -     CBC & Differential; Future  -     Comprehensive Metabolic Panel; Future  -     Magnesium; Future  -     TSH+Free T4; Future  -     Vitamin B12 anemia; Future  -     Folate anemia; Future  -     Vitamin D,25-Hydroxy; Future  -     Lipid Panel; Future  -     Iron Profile; Future    19. Chronic bilateral low back pain without sciatica  -     Ambulatory Referral to Pain Management    Other orders  -     budesonide (Pulmicort) 0.5 MG/2ML nebulizer solution; Take 2 mL by nebulization Daily.  Dispense: 60 each; Refill: 5    Back pain, patient was asking for some pain medications discussed our role in neck care, can make referral to pain management, for possible narcotic usage on a long-term basis,    BMI is within normal parameters. No other follow-up for BMI required.     Generalized weakness I spoke with the daughter today by phone at length about her medications were going to stop her Yupelri and put her on DuoNebs, Pulmicort nebs and and monitor her respiratory status, continue oxygen,    Frailty discussed with her frailty weakness that I would stop Trulicity completely which we have done and will monitor her sugars with Accu-Cheks continue Tradjenta as below,    End-stage renal disease on dialysis with tunneled dialysis catheter in the right upper chest wall, continues renal replacement with sevelamer carbonate 800 mg 3 times a day, will continue on dialysis here at the  facility,    Hypoxia continues on oxygen, previous pneumonia,    Previous pneumonia    Diabetes, continues Tradjenta 5 mg daily,, will stop Trulicity 1.5 mg daily, May 10, 2024    Coronary artery disease, continues carvedilol 12.5 mg twice a day, aspirin 81 mg daily, Cozaar 50 mg daily, Imdur 20 mg 3 times a day----does follow-up with [facility]    GERD, continues Dexilant 60 mg daily and probiotics Florastor 250 mg twice a day    COPD, continues Brovana nebs twice a day, DuoNebs 4 times a day, ProAir inhaler as needed,    Hypertension    Anemia chronic disease state will follow labs    PAD    Hyperlipidemia, continues rosuvastatin 20 mg daily    Lung cancer history--2005, chemo x 3 for right side, and partial lobectomy on the left side for recurrence of cancer,   Follow Up   Return in about 3 weeks (around 7/10/2024).  Patient was given instructions and counseling regarding her condition or for health maintenance advice. Please see specific information pulled into the AVS if appropriate.

## 2024-06-19 NOTE — TELEPHONE ENCOUNTER
Caller: Sanjuana Cornell  ( NOT ON  [facility]BAL)     Relationship to patient: Emergency Contact    Best call back number: [phone]    Patient is needing: PATIENT'S DAUGHTER CALLED TO SEE ABOUT  SENDING IN PAIN MEDICATION. PLEASE CALL AND ADVISE.

## 2024-06-21 ENCOUNTER — TELEPHONE (OUTPATIENT)
Dept: INTERNAL MEDICINE | Age: 89
End: 2024-06-21
Payer: MEDICARE

## 2024-06-27 ENCOUNTER — TRANSCRIBE ORDERS (OUTPATIENT)
Dept: ADMINISTRATIVE | Facility: HOSPITAL | Age: 89
End: 2024-06-27
Payer: MEDICARE

## 2024-06-27 ENCOUNTER — LAB (OUTPATIENT)
Dept: LAB | Facility: HOSPITAL | Age: 89
End: 2024-06-27
Payer: MEDICARE

## 2024-06-27 DIAGNOSIS — I50.33 ACUTE ON CHRONIC HEART FAILURE WITH PRESERVED EJECTION FRACTION (HFPEF): ICD-10-CM

## 2024-06-27 DIAGNOSIS — E55.9 VITAMIN D DEFICIENCY: ICD-10-CM

## 2024-06-27 DIAGNOSIS — I50.33 ACUTE ON CHRONIC DIASTOLIC HEART FAILURE: ICD-10-CM

## 2024-06-27 DIAGNOSIS — E78.5 HYPERLIPIDEMIA LDL GOAL <70: ICD-10-CM

## 2024-06-27 DIAGNOSIS — N18.4 CHRONIC KIDNEY DISEASE, STAGE IV (SEVERE): ICD-10-CM

## 2024-06-27 DIAGNOSIS — R54 FRAILTY SYNDROME IN GERIATRIC PATIENT: ICD-10-CM

## 2024-06-27 DIAGNOSIS — K21.9 GASTROESOPHAGEAL REFLUX DISEASE WITHOUT ESOPHAGITIS: ICD-10-CM

## 2024-06-27 DIAGNOSIS — E06.3 HYPOTHYROIDISM DUE TO HASHIMOTO'S THYROIDITIS: ICD-10-CM

## 2024-06-27 DIAGNOSIS — I73.9 PERIPHERAL VASCULAR DISEASE OF LOWER EXTREMITY: ICD-10-CM

## 2024-06-27 DIAGNOSIS — I65.29 STENOSIS OF CAROTID ARTERY, UNSPECIFIED LATERALITY: ICD-10-CM

## 2024-06-27 DIAGNOSIS — E11.51 TYPE 2 DIABETES MELLITUS WITH DIABETIC PERIPHERAL ANGIOPATHY WITHOUT GANGRENE, WITHOUT LONG-TERM CURRENT USE OF INSULIN: ICD-10-CM

## 2024-06-27 DIAGNOSIS — M48.062 SPINAL STENOSIS OF LUMBAR REGION WITH NEUROGENIC CLAUDICATION: ICD-10-CM

## 2024-06-27 DIAGNOSIS — G63 POLYNEUROPATHY ASSOCIATED WITH UNDERLYING DISEASE: ICD-10-CM

## 2024-06-27 DIAGNOSIS — Z87.891 EX-SMOKER: ICD-10-CM

## 2024-06-27 DIAGNOSIS — Z98.62 S/P RENAL ARTERY ANGIOPLASTY: ICD-10-CM

## 2024-06-27 DIAGNOSIS — I10 ESSENTIAL HYPERTENSION: ICD-10-CM

## 2024-06-27 DIAGNOSIS — N18.6 ESRD (END STAGE RENAL DISEASE): ICD-10-CM

## 2024-06-27 DIAGNOSIS — I50.33 ACUTE ON CHRONIC DIASTOLIC HEART FAILURE: Primary | ICD-10-CM

## 2024-06-27 DIAGNOSIS — I35.0 AORTIC STENOSIS, MODERATE: ICD-10-CM

## 2024-06-27 DIAGNOSIS — E03.8 HYPOTHYROIDISM DUE TO HASHIMOTO'S THYROIDITIS: ICD-10-CM

## 2024-06-27 DIAGNOSIS — J44.0 COPD WITH LOWER RESPIRATORY INFECTION: ICD-10-CM

## 2024-06-27 DIAGNOSIS — N18.4 STAGE 4 CHRONIC KIDNEY DISEASE: ICD-10-CM

## 2024-06-27 DIAGNOSIS — I25.10 CORONARY ARTERY CALCIFICATION SEEN ON CT SCAN: ICD-10-CM

## 2024-06-27 LAB
25(OH)D3 SERPL-MCNC: 51.1 NG/ML (ref 30–100)
ALBUMIN SERPL-MCNC: 3.9 G/DL (ref 3.5–5.2)
ALBUMIN/GLOB SERPL: 1.8 G/DL
ALP SERPL-CCNC: 191 U/L (ref 39–117)
ALT SERPL W P-5'-P-CCNC: <5 U/L (ref 1–33)
ANION GAP SERPL CALCULATED.3IONS-SCNC: 7.6 MMOL/L (ref 5–15)
AST SERPL-CCNC: 16 U/L (ref 1–32)
BASOPHILS # BLD AUTO: 0.02 10*3/MM3 (ref 0–0.2)
BASOPHILS NFR BLD AUTO: 0.4 % (ref 0–1.5)
BILIRUB CONJ SERPL-MCNC: <0.2 MG/DL (ref 0–0.3)
BILIRUB SERPL-MCNC: 0.3 MG/DL (ref 0–1.2)
BUN SERPL-MCNC: 15 MG/DL (ref 8–23)
BUN/CREAT SERPL: 6.3 (ref 7–25)
CALCIUM SPEC-SCNC: 9.1 MG/DL (ref 8.6–10.5)
CHLORIDE SERPL-SCNC: 98 MMOL/L (ref 98–107)
CHOLEST SERPL-MCNC: 126 MG/DL (ref 0–200)
CO2 SERPL-SCNC: 35.4 MMOL/L (ref 22–29)
CREAT SERPL-MCNC: 2.37 MG/DL (ref 0.57–1)
DEPRECATED RDW RBC AUTO: 57.1 FL (ref 37–54)
EGFRCR SERPLBLD CKD-EPI 2021: 19.4 ML/MIN/1.73
EOSINOPHIL # BLD AUTO: 0.14 10*3/MM3 (ref 0–0.4)
EOSINOPHIL NFR BLD AUTO: 2.7 % (ref 0.3–6.2)
ERYTHROCYTE [DISTWIDTH] IN BLOOD BY AUTOMATED COUNT: 14.9 % (ref 12.3–15.4)
GLOBULIN UR ELPH-MCNC: 2.2 GM/DL
GLUCOSE SERPL-MCNC: 120 MG/DL (ref 65–99)
HCT VFR BLD AUTO: 33.8 % (ref 34–46.6)
HDLC SERPL-MCNC: 88 MG/DL (ref 40–60)
HGB BLD-MCNC: 10.2 G/DL (ref 12–15.9)
IMM GRANULOCYTES # BLD AUTO: 0.02 10*3/MM3 (ref 0–0.05)
IMM GRANULOCYTES NFR BLD AUTO: 0.4 % (ref 0–0.5)
IRON 24H UR-MRATE: 64 MCG/DL (ref 37–145)
IRON SATN MFR SERPL: 22 % (ref 20–50)
LDLC SERPL CALC-MCNC: 26 MG/DL (ref 0–100)
LDLC/HDLC SERPL: 0.31 {RATIO}
LYMPHOCYTES # BLD AUTO: 0.73 10*3/MM3 (ref 0.7–3.1)
LYMPHOCYTES NFR BLD AUTO: 13.8 % (ref 19.6–45.3)
MAGNESIUM SERPL-MCNC: 2.2 MG/DL (ref 1.6–2.4)
MCH RBC QN AUTO: 31.2 PG (ref 26.6–33)
MCHC RBC AUTO-ENTMCNC: 30.2 G/DL (ref 31.5–35.7)
MCV RBC AUTO: 103.4 FL (ref 79–97)
MONOCYTES # BLD AUTO: 0.85 10*3/MM3 (ref 0.1–0.9)
MONOCYTES NFR BLD AUTO: 16.1 % (ref 5–12)
NEUTROPHILS NFR BLD AUTO: 3.52 10*3/MM3 (ref 1.7–7)
NEUTROPHILS NFR BLD AUTO: 66.6 % (ref 42.7–76)
NRBC BLD AUTO-RTO: 0 /100 WBC (ref 0–0.2)
NT-PROBNP SERPL-MCNC: ABNORMAL PG/ML (ref 0–1800)
PLATELET # BLD AUTO: 200 10*3/MM3 (ref 140–450)
PMV BLD AUTO: 10.4 FL (ref 6–12)
POTASSIUM SERPL-SCNC: 4.2 MMOL/L (ref 3.5–5.2)
PROT SERPL-MCNC: 6.1 G/DL (ref 6–8.5)
RBC # BLD AUTO: 3.27 10*6/MM3 (ref 3.77–5.28)
SODIUM SERPL-SCNC: 141 MMOL/L (ref 136–145)
T4 FREE SERPL-MCNC: 1.28 NG/DL (ref 0.92–1.68)
TIBC SERPL-MCNC: 288 MCG/DL (ref 298–536)
TRANSFERRIN SERPL-MCNC: 193 MG/DL (ref 200–360)
TRIGL SERPL-MCNC: 55 MG/DL (ref 0–150)
TSH SERPL DL<=0.05 MIU/L-ACNC: 7.25 UIU/ML (ref 0.27–4.2)
VIT B12 BLD-MCNC: 579 PG/ML (ref 211–946)
VLDLC SERPL-MCNC: 12 MG/DL (ref 5–40)
WBC NRBC COR # BLD AUTO: 5.28 10*3/MM3 (ref 3.4–10.8)

## 2024-06-27 PROCEDURE — 85025 COMPLETE CBC W/AUTO DIFF WBC: CPT

## 2024-06-27 PROCEDURE — 83735 ASSAY OF MAGNESIUM: CPT

## 2024-06-27 PROCEDURE — 80061 LIPID PANEL: CPT

## 2024-06-27 PROCEDURE — 82306 VITAMIN D 25 HYDROXY: CPT

## 2024-06-27 PROCEDURE — 83880 ASSAY OF NATRIURETIC PEPTIDE: CPT

## 2024-06-27 PROCEDURE — 84466 ASSAY OF TRANSFERRIN: CPT

## 2024-06-27 PROCEDURE — 84439 ASSAY OF FREE THYROXINE: CPT

## 2024-06-27 PROCEDURE — 83540 ASSAY OF IRON: CPT

## 2024-06-27 PROCEDURE — 80053 COMPREHEN METABOLIC PANEL: CPT

## 2024-06-27 PROCEDURE — 82607 VITAMIN B-12: CPT

## 2024-06-27 PROCEDURE — 82248 BILIRUBIN DIRECT: CPT

## 2024-06-27 PROCEDURE — 84443 ASSAY THYROID STIM HORMONE: CPT

## 2024-06-27 PROCEDURE — 36415 COLL VENOUS BLD VENIPUNCTURE: CPT

## 2024-06-27 NOTE — PROGRESS NOTES
Informed pt that her fluid level is up, she has no c/o soa or swelling, states that she has been going to dialysis.

## 2024-06-28 ENCOUNTER — TELEPHONE (OUTPATIENT)
Dept: INTERNAL MEDICINE | Age: 89
End: 2024-06-28

## 2024-06-28 ENCOUNTER — TELEPHONE (OUTPATIENT)
Dept: CARDIOLOGY | Facility: CLINIC | Age: 89
End: 2024-06-28
Payer: MEDICARE

## 2024-06-28 NOTE — TELEPHONE ENCOUNTER
----- Message from Jazzy Addison sent at 6/28/2024 11:03 AM EDT -----  Lipid panel and liver enzymes are stable, continue current medication

## 2024-06-28 NOTE — TELEPHONE ENCOUNTER
Caller: [facility] - [address] [facility] AT Portal to Registered [facility] - [phone]  - [phone]    Relationship to patient: Pharmacy    Best call back number: [phone]     Patient is needing: CALLER  FROM [facility] CALLED NEEDING TO SPEAK WITH CLINICAL STAFF. CALLER STATES THEY HAVE FAXED OVER PAPERWORK MULTIPLE TIMES REGARDING THE PATIENTS BUDESONIDE NEBULIZER SOLUTION AND HAVE NOT HEARD BACK FROM ANYONE. CALLER STATES THEY HAVE ADDITIONAL CLINICAL QUESTIONS AND ALSO NEED A PA. CALLER STATES THIS IS TIME SYSTEM BEFORE THEIR SYSTEM WILL REJECT THE PRESCRIPTION DUE TO NOT HEARING BACK FROM THE OFFICE. REFERENCE NUMBER FOR MEDICATION IS [id number]

## 2024-06-28 NOTE — TELEPHONE ENCOUNTER
[facility] CALLED NEEDING TO SPEAK WITH CLINICAL STAFF.   PLEASE CALL BACK WITH THE NEEDED CLINICAL INFORMATION.

## 2024-07-02 ENCOUNTER — OFFICE VISIT (OUTPATIENT)
Dept: CARDIOLOGY | Facility: CLINIC | Age: 89
End: 2024-07-02
Payer: MEDICARE

## 2024-07-02 VITALS
WEIGHT: 142 LBS | BODY MASS INDEX: 24.24 KG/M2 | SYSTOLIC BLOOD PRESSURE: 112 MMHG | HEIGHT: 64 IN | HEART RATE: 72 BPM | DIASTOLIC BLOOD PRESSURE: 40 MMHG

## 2024-07-02 DIAGNOSIS — E03.9 HYPOTHYROIDISM (ACQUIRED): ICD-10-CM

## 2024-07-02 DIAGNOSIS — N18.6 END STAGE RENAL DISEASE ON DIALYSIS: ICD-10-CM

## 2024-07-02 DIAGNOSIS — I50.32 CHRONIC HEART FAILURE WITH PRESERVED EJECTION FRACTION: Primary | ICD-10-CM

## 2024-07-02 DIAGNOSIS — E78.5 HYPERLIPIDEMIA LDL GOAL <70: ICD-10-CM

## 2024-07-02 DIAGNOSIS — Z99.2 END STAGE RENAL DISEASE ON DIALYSIS: ICD-10-CM

## 2024-07-02 DIAGNOSIS — I35.0 AORTIC STENOSIS, MODERATE: ICD-10-CM

## 2024-07-02 LAB
ALBUMIN SERPL-MCNC: 3.7 G/DL (ref 3.5–5.2)
ALBUMIN/GLOB SERPL: 1.7 G/DL
ALP SERPL-CCNC: 164 U/L (ref 39–117)
ALT SERPL W P-5'-P-CCNC: 7 U/L (ref 1–33)
ANION GAP SERPL CALCULATED.3IONS-SCNC: 9.7 MMOL/L (ref 5–15)
AST SERPL-CCNC: 13 U/L (ref 1–32)
BILIRUB SERPL-MCNC: 0.3 MG/DL (ref 0–1.2)
BUN SERPL-MCNC: 23 MG/DL (ref 8–23)
BUN/CREAT SERPL: 8.2 (ref 7–25)
CALCIUM SPEC-SCNC: 8.5 MG/DL (ref 8.6–10.5)
CHLORIDE SERPL-SCNC: 96 MMOL/L (ref 98–107)
CO2 SERPL-SCNC: 34.3 MMOL/L (ref 22–29)
CREAT SERPL-MCNC: 2.8 MG/DL (ref 0.57–1)
EGFRCR SERPLBLD CKD-EPI 2021: 15.9 ML/MIN/1.73
GLOBULIN UR ELPH-MCNC: 2.2 GM/DL
GLUCOSE SERPL-MCNC: 124 MG/DL (ref 65–99)
POTASSIUM SERPL-SCNC: 3.7 MMOL/L (ref 3.5–5.2)
PROT SERPL-MCNC: 5.9 G/DL (ref 6–8.5)
SODIUM SERPL-SCNC: 140 MMOL/L (ref 136–145)

## 2024-07-02 PROCEDURE — 86334 IMMUNOFIX E-PHORESIS SERUM: CPT | Performed by: INTERNAL MEDICINE

## 2024-07-02 PROCEDURE — 83521 IG LIGHT CHAINS FREE EACH: CPT | Performed by: INTERNAL MEDICINE

## 2024-07-02 PROCEDURE — 80053 COMPREHEN METABOLIC PANEL: CPT | Performed by: INTERNAL MEDICINE

## 2024-07-02 PROCEDURE — 84165 PROTEIN E-PHORESIS SERUM: CPT | Performed by: INTERNAL MEDICINE

## 2024-07-02 PROCEDURE — 82784 ASSAY IGA/IGD/IGG/IGM EACH: CPT | Performed by: INTERNAL MEDICINE

## 2024-07-02 PROCEDURE — 82306 VITAMIN D 25 HYDROXY: CPT | Performed by: INTERNAL MEDICINE

## 2024-07-02 RX ORDER — ERGOCALCIFEROL 1.25 MG/1
50000 CAPSULE ORAL
COMMUNITY

## 2024-07-02 RX ORDER — SPIRONOLACTONE 25 MG/1
25 TABLET ORAL EVERY OTHER DAY
Qty: 45 TABLET | Refills: 3 | Status: SHIPPED | OUTPATIENT
Start: 2024-07-02

## 2024-07-02 RX ORDER — LEVOTHYROXINE SODIUM 0.05 MG/1
25 TABLET ORAL DAILY
Qty: 45 TABLET | Refills: 3 | Status: SHIPPED | OUTPATIENT
Start: 2024-07-02

## 2024-07-02 NOTE — PROGRESS NOTES
Office Visit    Chief Complaint  HFpEF    Subjective            Charlette Rai presents to [facility] CARDIOLOGY  History of Present Illness  Ms. Charlette Membreno is a 89 years old female with heart failure with preserved ejection fraction.  She reports that she is feeling better.  Her shortness of air has improved although her activity is very limited.  She has been encouraged to walk 10 minutes 3 times a day.  Her pedal edema is minimal despite the elevated BNP.  Ms. Rai does dialysis Monday Wednesday and Friday.  She continues to make some urine.  She is tolerating the diuretics well.  Ms. Rai denies any chest pain, palpitations, dizziness, or syncopal episodes.      Past Medical History:   Diagnosis Date    Allergic rhinitis     Anaphylactic shock, unspecified, initial encounter 12/05/2023    Anemia     Arthritis     Asthma     USE INHALERS AND NEBULIZERS    Back pain     Bladder disorder     Cancer     LEFT LUNG CANCER 2005 SURGERY AND CHEMO DONE  AND CURRENTLY 4/ 14/22  RIGHT LUNG CANCER HAS ONLY RECEIVED RADIATION THUS FAR    Chronic kidney disease     stage 3    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 DENIES CP BUT DOES HAVE SOA CHRONIC ISSUE COPD/LUNG CANCER    COPD (chronic obstructive pulmonary disease)     FOLLOWED BY DR MARIAJOSE BAILEY    Coronary artery disease     DENIES CP BUT DOES GET SOA MOST OF THE TIME WITH EXERTION BUT OCC AT REST CHRONIC ISSUE COPD/LUNG CANCER    Deep vein thrombosis     Diabetes mellitus     DOES NOT CHECK BS DAILY    Disease of thyroid gland     HYPOTHYROIDISM    Essential hypertension     Gastric ulcer     GERD (gastroesophageal reflux disease)     Heart murmur     History of transfusion     NO ISSUES POST TRANSFUSION WAS MANY YEARS AGO    Hyperlipemia     Leukocytopenia     FOLLOWED BY DR HESTER  MOULANA    Limb swelling     Lumbago     Lumbar spinal stenosis     Lung cancer     Migraine headache     Multiple joint pain     On home oxygen therapy     3L/NC PRN    Osteopenia     PNA (pneumonia) 05/04/2024    Pseudomonas pneumonia 04/29/2024    Reflux esophagitis     Shortness of breath     Thyroid nodule     HAS ULTRASOUND YEARLY BEING MONITORED    Vascular disease     Vitamin D deficiency        No Known Allergies     Past Surgical History:   Procedure Laterality Date    ABDOMINAL SURGERY      ANGIOPLASTY RENAL ARTERY Left 06/14/2024    stent placement    APPENDECTOMY      ARTERIOGRAM N/A 6/14/2024    Procedure: Arteriogram, right radial access, aortogram and renal arteriogram with possible intervention.;  Surgeon: Dio Miguel MD;  Location: [facility] CATH INVASIVE LOCATION;  Service: Peripheral Vascular;  Laterality: N/A;    ARTERIOVENOUS FISTULA/SHUNT SURGERY Left 05/10/2022    Procedure: Left basilic vein transposition;  Surgeon: Wan Barksdale MD;  Location: [facility] MAIN OR;  Service: Vascular;  Laterality: Left;    ARTERIOVENOUS FISTULA/SHUNT SURGERY Left 03/23/2023    Procedure: Ligation of left arm arteriovenous fistula;  Surgeon: Wan Barksdale MD;  Location: [facility] MAIN OR;  Service: Vascular;  Laterality: Left;    ARTERIOVENOUS FISTULA/SHUNT SURGERY Left 11/30/2023    Procedure: Creation of left arm arteriovenous graft;  Surgeon: Wan Barksdale MD;  Location: [facility] MAIN OR;  Service: Vascular;  Laterality: Left;    BRONCHOSCOPY N/A 04/24/2024    Procedure: BRONCHOSCOPY WITH BAL AND WASHINGS;  Surgeon: Khalif Yanez MD;  Location: [facility] ENDOSCOPY;  Service: Pulmonary;  Laterality: N/A;  MUCUS PLUGGING    CARDIAC CATHETERIZATION  1996    CARDIAC SURGERY      CARDIAC SURGERY      fluid drained from heart    CATARACT EXTRACTION, BILATERAL  2003    COLONOSCOPY  2014    ENDOSCOPY  2016    2019    FEMORAL ARTERY STENT Bilateral     HYSTERECTOMY      LUNG BIOPSY Left 2005    lobectomy upper lung caner     LUNG VOLUME REDUCTION      OTHER SURGICAL HISTORY      artifical joints/limbs    REPLACEMENT TOTAL KNEE Left 2016    UPPER GASTROINTESTINAL ENDOSCOPY          Social History     Tobacco Use    Smoking status: Former     Current packs/day: 0.00     Average packs/day: 1 pack/day for 52.5 years (52.5 ttl pk-yrs)     Types: Cigarettes     Start date:      Quit date: 2004     Years since quittin.0     Passive exposure: Past    Smokeless tobacco: Never   Vaping Use    Vaping status: Never Used   Substance Use Topics    Alcohol use: Never    Drug use: Never       Family History   Problem Relation Age of Onset    Arthritis Mother     Arthritis Father     Cancer Brother     Diabetes Brother     Other Brother         blood disease     Prostate cancer Brother     Cancer Brother     Prostate cancer Brother     Cancer Brother     Cancer Brother     Malig Hyperthermia Neg Hx     Colon cancer Neg Hx         Prior to Admission medications    Medication Sig Start Date End Date Taking? Authorizing Provider   acetaminophen (TYLENOL) 325 MG tablet Take 2 tablets by mouth Every 6 (Six) Hours As Needed for Mild Pain.   Yes ProviderSarah MD   albuterol sulfate  (90 Base) MCG/ACT inhaler Inhale 2 puffs Every 4 (Four) Hours As Needed for Wheezing.   Yes ProviderSarah MD   aspirin 81 MG EC tablet Take 1 tablet by mouth Daily.   Yes Sarah Parker MD   budesonide (PULMICORT) 0.5 MG/2ML nebulizer solution Take 2 mL by nebulization Daily.   Yes Sarah Parker MD   budesonide (Pulmicort) 0.5 MG/2ML nebulizer solution Take 2 mL by nebulization Daily. 24  Yes Brennan Mosqueda MD   carvedilol (COREG) 12.5 MG tablet Take 1 tablet by mouth 2 (Two) Times a Day With Meals for 30 days. 24 Yes Rafael Lyons MD   clopidogrel (Plavix) 75 MG tablet Take 1 tablet by mouth Daily. 6/15/24 6/15/25 Yes Dio Miguel MD   dexlansoprazole (DEXILANT) 60 MG capsule Take 1 capsule by mouth  Daily. 9/23/22  Yes Sarah Parker MD   furosemide (LASIX) 20 MG tablet Take 1 tablet by mouth Daily. 6/18/24  Yes Halima Quick APRN   HYDROcodone-acetaminophen (Norco) 5-325 MG per tablet Take 1 tablet by mouth Every 8 (Eight) Hours As Needed for Moderate Pain. 6/19/24  Yes Brennan Mosqueda MD   ipratropium-albuterol (DUO-NEB) 0.5-2.5 mg/3 ml nebulizer Take 3 mL by nebulization Every 4 (Four) Hours As Needed for Shortness of Air or Wheezing for up to 30 days. 4/29/24 7/2/24 Yes Rafael Lyons MD   isosorbide dinitrate (ISORDIL) 20 MG tablet Take 1 tablet by mouth 3 (Three) Times a Day for 30 days. 4/29/24 7/2/24 Yes Rafael Lyons MD   levocetirizine (XYZAL) 5 MG tablet TAKE 1 TABLET DAILY  Patient taking differently: Take 1 tablet by mouth Every Evening. 6/23/23  Yes Paloma James MD   linagliptin (Tradjenta) 5 MG tablet tablet Take 1 tablet by mouth Daily. 1/10/22  Yes Paloma James MD   losartan (COZAAR) 100 MG tablet Take 1 tablet by mouth Daily for 30 days. 6/4/24 7/4/24 Yes Pieter Salgado MD   Methylcellulose, Laxative, (FIBER THERAPY PO) Take 2 tablets by mouth Daily.   Yes Sarah Parker MD   nitroglycerin (NITROSTAT) 0.4 MG SL tablet Place 1 tablet under the tongue Every 5 (Five) Minutes As Needed for Chest Pain.   Yes Sarah Parker MD   rosuvastatin (CRESTOR) 20 MG tablet Take 1 tablet by mouth Daily. 7/7/23  Yes León Sanchez MD   sevelamer (RENVELA) 800 MG tablet Take 1 tablet by mouth 3 (Three) Times a Day With Meals. 10/9/23  Yes Sarah Parker MD   spironolactone (ALDACTONE) 25 MG tablet Take 0.5 tablets by mouth Every Other Day. 6/18/24  Yes Halima Quick APRN   tobramycin (JUAN) Take 5 mL by nebulization 2 (Two) Times a Day.   Yes Provider, Historical, MD   vitamin D (ERGOCALCIFEROL) 1.25 MG (26179 UT) capsule capsule Take 1 capsule by mouth Every 14 (Fourteen) Days.   Yes Provider, Historical, MD   saccharomyces boulardii  "(FLORASTOR) 250 MG capsule Take 1 capsule by mouth 2 (Two) Times a Day.  Patient not taking: Reported on 7/2/2024    Provider, Sarah, MD        Review of Systems   Constitutional:  Positive for fatigue.   Respiratory:  Positive for shortness of breath. Negative for cough.    Cardiovascular:  Positive for leg swelling. Negative for chest pain and palpitations.   Neurological:  Negative for dizziness.        Symptom Course: Improved    Weight Trend: Stable     Objective     /40   Pulse 72   Ht 162.6 cm (64.02\")   Wt 64.4 kg (142 lb)   BMI 24.36 kg/m²       Physical Exam  Constitutional:       General: She is awake.      Appearance: Normal appearance.   Neck:      Thyroid: No thyromegaly.      Vascular: No carotid bruit or JVD.   Cardiovascular:      Rate and Rhythm: Normal rate and regular rhythm.      Chest Wall: PMI is not displaced.      Pulses: Normal pulses.      Heart sounds: S1 normal and S2 normal. Murmur heard.      Systolic murmur is present.      No friction rub. No gallop. No S3 or S4 sounds.   Pulmonary:      Effort: Pulmonary effort is normal.      Breath sounds: Normal breath sounds and air entry. No wheezing, rhonchi or rales.   Abdominal:      General: Bowel sounds are normal.      Palpations: Abdomen is soft. There is no mass.      Tenderness: There is no abdominal tenderness.   Musculoskeletal:      Cervical back: Neck supple.      Right lower leg: No edema.      Left lower leg: No edema.   Neurological:      Mental Status: She is alert and oriented to person, place, and time.   Psychiatric:         Mood and Affect: Mood normal.         Behavior: Behavior is cooperative.           Result Review :                      Lab Results   Component Value Date    PROBNP 29,939.0 (H) 06/27/2024    PROBNP 18,671.0 (H) 05/30/2024    PROBNP 7,318.0 (H) 04/21/2024     09/09/2019     04/11/2019     CMP          6/14/2024    10:00 6/15/2024    05:12 6/27/2024    09:46   CMP   Glucose " "120  180  120    BUN 25  30  15    Creatinine 2.16  2.47  2.37    EGFR 21.7  18.5  19.4    Sodium 140  139  141    Potassium 3.7  3.5  4.2    Chloride 98  98  98    Calcium 8.6  8.0  9.1    Total Protein   6.1    Albumin   3.9    Globulin   2.2    Total Bilirubin   0.3    Alkaline Phosphatase   191    AST (SGOT)   16    ALT (SGPT)   <5    Albumin/Globulin Ratio   1.8    BUN/Creatinine Ratio 11.6  12.1  6.3    Anion Gap 7.9  7.2  7.6      CBC w/diff          6/15/2024    05:12 6/26/2024    00:00 6/27/2024    09:46   CBC w/Diff   WBC 8.27   5.28    RBC 3.09   3.27    Hemoglobin 9.5  9.3        27.9     10.2    Hematocrit 31.4   33.8    .6   103.4    MCH 30.7   31.2    MCHC 30.3   30.2    RDW 15.0   14.9    Platelets 97   200    Neutrophil Rel % 80.2   66.6    Immature Granulocyte Rel % 0.5   0.4    Lymphocyte Rel % 6.7   13.8    Monocyte Rel % 11.1   16.1    Eosinophil Rel % 1.3   2.7    Basophil Rel % 0.2   0.4       Details          This result is from an external source.              Lipid Panel          7/26/2023    08:17 6/27/2024    09:46   Lipid Panel   Total Cholesterol 112  126    Triglycerides 83  55    HDL Cholesterol 60  88    VLDL Cholesterol 16  12    LDL Cholesterol  36  26    LDL/HDL Ratio 0.59  0.31       Lab Results   Component Value Date    TSH 7.250 (H) 06/27/2024    TSH 3.630 05/01/2024    TSH 1.650 10/29/2023      Lab Results   Component Value Date    FREET4 1.28 06/27/2024    FREET4 1.34 05/01/2024    FREET4 1.4 08/25/2020      D-Dimer, Quantitative   Date Value Ref Range Status   03/13/2024 4.30 (H) 0.00 - 0.87 MCGFEU/mL Final     Magnesium   Date Value Ref Range Status   06/27/2024 2.2 1.6 - 2.4 mg/dL Final      No results found for: \"DIGOXIN\"   A1C Last 3 Results          10/30/2023    16:05 4/19/2024    00:00 5/29/2024    00:00   HGBA1C Last 3 Results   Hemoglobin A1C 7.80  5.7     5.4          Details          This result is from an external source.                  Results for " orders placed during the hospital encounter of 05/30/24    Adult Transthoracic Echo Complete W/ Cont if Necessary Per Protocol    Interpretation Summary    Left ventricular systolic function is normal. Estimated left ventricular EF = 55%    Left ventricular wall thickness is consistent with mild to moderate concentric hypertrophy. Sigmoid-shaped ventricular septum is present.    Left ventricular diastolic function is consistent with (grade Ia w/high LAP) impaired relaxation.    The left atrial cavity is mildly dilated.    Moderate aortic valve stenosis is present.    Moderate mitral valve stenosis is present.    Estimated right ventricular systolic pressure from tricuspid regurgitation is moderately elevated (45-55 mmHg).        Assessment and Plan        Diagnoses and all orders for this visit:    1. Chronic heart failure with preserved ejection fraction (Primary)  Assessment & Plan:  Ms. Rai has heart failure with preserved ejection fraction who appears to be doing a little better today.  Her short of air and edema have both improved.  She is tolerating the carvedilol, Lasix, isosorbide and losartan at the current doses.  She is unable to utilize an SGLT2 due to stage IV renal disease on hemodialysis.  Will make the following changes to her heart failure medications:    1.  Start levothyroxine 25 mcg 1 tablet a day early in the morning on an empty stomach do not eat anything for 30 minutes after taking medication.  2.  Increase spironolactone to 25 mg 1 tablet every other day.  3.  Check heart rate blood pressure twice a day and weight every day.  4.  Do blood work 1 or 2 days before your next office visit.    She needs workup of amyloidosis as a cause of her diastolic heart failure.  Will add the labs and schedule her for a PYP scan    Orders:  -     Comprehensive Metabolic Panel  -     25-HydroxyVitamin D LCMS D2+D3; Future  -     Immunofixation Electrophoresis, Protein Electrophoresis & Immunoglobulin Free  Light Chains (Kappa / Lambda), Serum; Future  -     High Sensitivity Troponin T; Future  -     Cardiac Amyloid Pyrophosphate (PYP) Scan; Future  -     Magnesium; Future  -     Comprehensive Metabolic Panel; Future  -     T4, Free; Future  -     TSH; Future  -     25-HydroxyVitamin D LCMS D2+D3  -     Immunofixation Electrophoresis, Protein Electrophoresis & Immunoglobulin Free Light Chains (Kappa / Lambda), Serum    2. End stage renal disease on dialysis  Assessment & Plan:  Ms. Rai has end-stage renal disease, on hemodialysis.  She does make urine and responds to diuretic therapy.  Will slowly titrate spironolactone to a full tablet every other day and continue to monitor renal function.    Orders:  -     25-HydroxyVitamin D LCMS D2+D3; Future  -     25-HydroxyVitamin D LCMS D2+D3    3. Aortic stenosis, moderate  Assessment & Plan:  Ms. Rai has moderate aortic stenosis on her most recent echocardiogram.  Will continue to monitor with a repeat echo in 6 months.    Orders:  -     Comprehensive Metabolic Panel  -     25-HydroxyVitamin D LCMS D2+D3; Future  -     Immunofixation Electrophoresis, Protein Electrophoresis & Immunoglobulin Free Light Chains (Kappa / Lambda), Serum; Future  -     High Sensitivity Troponin T; Future  -     Cardiac Amyloid Pyrophosphate (PYP) Scan; Future  -     25-HydroxyVitamin D LCMS D2+D3  -     Immunofixation Electrophoresis, Protein Electrophoresis & Immunoglobulin Free Light Chains (Kappa / Lambda), Serum    4. Hyperlipidemia LDL goal <70  Assessment & Plan:  Ms. Mckeon lipid panel is at goal with the current dose of Crestor.  I will continue the same.      5. Hypothyroidism (acquired)  Assessment & Plan:  Ms. Mckeon TSH is elevated at 7.6.  Will start her on low-dose levothyroxine 25 mcg and recheck in 2 months.    Orders:  -     T4, Free; Future  -     TSH; Future    Other orders  -     spironolactone (ALDACTONE) 25 MG tablet; Take 1 tablet by mouth Every Other Day.  Dispense:  45 tablet; Refill: 3  -     levothyroxine (Synthroid) 50 MCG tablet; Take 0.5 tablets by mouth Daily.  Dispense: 45 tablet; Refill: 3  -     No Caffeine Prior to Cardiac Amyloid Pyrophosphate (PYP) Scan        She has chronic cardiovascular conditions that require close follow-up    Follow Up     Return for 4 to 6 weeks office visit.    Patient was given instructions and counseling regarding her condition or for health maintenance advice. Please see specific information pulled into the AVS if appropriate.     Total time spent, 45 minutes.This time includes time spent by me for the following activities:  Reviewing past records including hospitalizations, performing a cardiac focused examination and/or evaluation, educating and counselling the patient and family, independently interpreting results and diagnostic tests and communicating that information to patient and family, documenting information in the medical record, etc. E/M time spent excludes any time spent on other procedures.    Electronically signed by Maria Teresa Malhotra MD, 07/02/24, 2:49 PM EDT.

## 2024-07-02 NOTE — ASSESSMENT & PLAN NOTE
Ms. Rai has end-stage renal disease, on hemodialysis.  She does make urine and responds to diuretic therapy.  Will slowly titrate spironolactone to a full tablet every other day and continue to monitor renal function.

## 2024-07-02 NOTE — PATIENT INSTRUCTIONS
1.  Start levothyroxine 25 mcg 1 tablet a day early in the morning on an empty stomach do not eat anything for 30 minutes after taking medication.  2.  Increase spironolactone to 25 mg 1 tablet every other day.  3.  Check heart rate blood pressure twice a day and weight every day.  4.  Do blood work 1 or 2 days before your next office visit

## 2024-07-02 NOTE — ASSESSMENT & PLAN NOTE
Ms. Rai has heart failure with preserved ejection fraction who appears to be doing a little better today.  Her short of air and edema have both improved.  She is tolerating the carvedilol, Lasix, isosorbide and losartan at the current doses.  She is unable to utilize an SGLT2 due to stage IV renal disease on hemodialysis.  Will make the following changes to her heart failure medications:    1.  Start levothyroxine 25 mcg 1 tablet a day early in the morning on an empty stomach do not eat anything for 30 minutes after taking medication.  2.  Increase spironolactone to 25 mg 1 tablet every other day.  3.  Check heart rate blood pressure twice a day and weight every day.  4.  Do blood work 1 or 2 days before your next office visit.    She needs workup of amyloidosis as a cause of her diastolic heart failure.  Will add the labs and schedule her for a PYP scan

## 2024-07-02 NOTE — ASSESSMENT & PLAN NOTE
Ms. Rai has moderate aortic stenosis on her most recent echocardiogram.  Will continue to monitor with a repeat echo in 6 months.

## 2024-07-02 NOTE — ASSESSMENT & PLAN NOTE
Ms. Rai's TSH is elevated at 7.6.  Will start her on low-dose levothyroxine 25 mcg and recheck in 2 months.

## 2024-07-05 LAB
ALBUMIN SERPL ELPH-MCNC: 3.3 G/DL (ref 2.9–4.4)
ALBUMIN/GLOB SERPL: 1.4 {RATIO} (ref 0.7–1.7)
ALPHA1 GLOB SERPL ELPH-MCNC: 0.3 G/DL (ref 0–0.4)
ALPHA2 GLOB SERPL ELPH-MCNC: 0.6 G/DL (ref 0.4–1)
B-GLOBULIN SERPL ELPH-MCNC: 0.8 G/DL (ref 0.7–1.3)
GAMMA GLOB SERPL ELPH-MCNC: 0.7 G/DL (ref 0.4–1.8)
GLOBULIN SER-MCNC: 2.4 G/DL (ref 2.2–3.9)
IGA SERPL-MCNC: 221 MG/DL (ref 64–422)
IGG SERPL-MCNC: 769 MG/DL (ref 586–1602)
IGM SERPL-MCNC: 98 MG/DL (ref 26–217)
INTERPRETATION SERPL IEP-IMP: ABNORMAL
KAPPA LC FREE SER-MCNC: 102.3 MG/L (ref 3.3–19.4)
KAPPA LC FREE/LAMBDA FREE SER: 1.73 {RATIO} (ref 0.26–1.65)
LABORATORY COMMENT REPORT: ABNORMAL
LAMBDA LC FREE SERPL-MCNC: 59 MG/L (ref 5.7–26.3)
M PROTEIN SERPL ELPH-MCNC: ABNORMAL G/DL
PROT SERPL-MCNC: 5.7 G/DL (ref 6–8.5)

## 2024-07-06 LAB
25(OH)D2 SERPL-MCNC: 54 NG/ML
25(OH)D3 SERPL-MCNC: 4.2 NG/ML
25(OH)D3+25(OH)D2 SERPL-MCNC: 58 NG/ML

## 2024-07-09 ENCOUNTER — TELEPHONE (OUTPATIENT)
Dept: INTERNAL MEDICINE | Age: 89
End: 2024-07-09
Payer: MEDICARE

## 2024-07-09 NOTE — TELEPHONE ENCOUNTER
Karma from [facility] called stating pt BP is 138/36 , please advise. Also ,gave verbal for PT and OT.

## 2024-07-11 LAB
25(OH)D2 SERPL-MCNC: 54 NG/ML
25(OH)D3 SERPL-MCNC: 4.1 NG/ML
25(OH)D3+25(OH)D2 SERPL-MCNC: 58 NG/ML

## 2024-07-15 ENCOUNTER — HOSPITAL ENCOUNTER (EMERGENCY)
Facility: HOSPITAL | Age: 89
Discharge: HOME | End: 2024-07-15
Attending: EMERGENCY MEDICINE
Payer: MEDICARE

## 2024-07-15 ENCOUNTER — APPOINTMENT (OUTPATIENT)
Dept: GENERAL RADIOLOGY | Facility: HOSPITAL | Age: 89
End: 2024-07-15
Payer: MEDICARE

## 2024-07-15 VITALS
WEIGHT: 146.83 LBS | SYSTOLIC BLOOD PRESSURE: 196 MMHG | HEART RATE: 71 BPM | OXYGEN SATURATION: 95 % | RESPIRATION RATE: 20 BRPM | TEMPERATURE: 98.1 F | BODY MASS INDEX: 24.46 KG/M2 | DIASTOLIC BLOOD PRESSURE: 58 MMHG | HEIGHT: 65 IN

## 2024-07-15 DIAGNOSIS — N18.6 END STAGE RENAL DISEASE: Primary | ICD-10-CM

## 2024-07-15 DIAGNOSIS — R06.02 SHORTNESS OF BREATH: ICD-10-CM

## 2024-07-15 LAB
ALBUMIN SERPL-MCNC: 3.8 G/DL (ref 3.5–5.2)
ALBUMIN/GLOB SERPL: 1.7 G/DL
ALP SERPL-CCNC: 159 U/L (ref 39–117)
ALT SERPL W P-5'-P-CCNC: 7 U/L (ref 1–33)
ANION GAP SERPL CALCULATED.3IONS-SCNC: 11.4 MMOL/L (ref 5–15)
AST SERPL-CCNC: 20 U/L (ref 1–32)
BASOPHILS # BLD AUTO: 0.03 10*3/MM3 (ref 0–0.2)
BASOPHILS NFR BLD AUTO: 0.4 % (ref 0–1.5)
BILIRUB SERPL-MCNC: 0.3 MG/DL (ref 0–1.2)
BUN SERPL-MCNC: 39 MG/DL (ref 8–23)
BUN/CREAT SERPL: 9.3 (ref 7–25)
CALCIUM SPEC-SCNC: 8.5 MG/DL (ref 8.6–10.5)
CHLORIDE SERPL-SCNC: 98 MMOL/L (ref 98–107)
CO2 SERPL-SCNC: 31.6 MMOL/L (ref 22–29)
CREAT SERPL-MCNC: 4.18 MG/DL (ref 0.57–1)
DEPRECATED RDW RBC AUTO: 55.5 FL (ref 37–54)
EGFRCR SERPLBLD CKD-EPI 2021: 9.8 ML/MIN/1.73
EOSINOPHIL # BLD AUTO: 0.06 10*3/MM3 (ref 0–0.4)
EOSINOPHIL NFR BLD AUTO: 0.9 % (ref 0.3–6.2)
ERYTHROCYTE [DISTWIDTH] IN BLOOD BY AUTOMATED COUNT: 14.8 % (ref 12.3–15.4)
FLUAV SUBTYP SPEC NAA+PROBE: NOT DETECTED
FLUBV RNA ISLT QL NAA+PROBE: NOT DETECTED
GEN 5 2HR TROPONIN T REFLEX: 63 NG/L
GLOBULIN UR ELPH-MCNC: 2.3 GM/DL
GLUCOSE SERPL-MCNC: 124 MG/DL (ref 65–99)
HCT VFR BLD AUTO: 35.5 % (ref 34–46.6)
HGB BLD-MCNC: 10.7 G/DL (ref 12–15.9)
HOLD SPECIMEN: NORMAL
HOLD SPECIMEN: NORMAL
IMM GRANULOCYTES # BLD AUTO: 0.02 10*3/MM3 (ref 0–0.05)
IMM GRANULOCYTES NFR BLD AUTO: 0.3 % (ref 0–0.5)
LYMPHOCYTES # BLD AUTO: 0.72 10*3/MM3 (ref 0.7–3.1)
LYMPHOCYTES NFR BLD AUTO: 10.6 % (ref 19.6–45.3)
MCH RBC QN AUTO: 30.9 PG (ref 26.6–33)
MCHC RBC AUTO-ENTMCNC: 30.1 G/DL (ref 31.5–35.7)
MCV RBC AUTO: 102.6 FL (ref 79–97)
MONOCYTES # BLD AUTO: 0.82 10*3/MM3 (ref 0.1–0.9)
MONOCYTES NFR BLD AUTO: 12.1 % (ref 5–12)
NEUTROPHILS NFR BLD AUTO: 5.12 10*3/MM3 (ref 1.7–7)
NEUTROPHILS NFR BLD AUTO: 75.7 % (ref 42.7–76)
NRBC BLD AUTO-RTO: 0 /100 WBC (ref 0–0.2)
NT-PROBNP SERPL-MCNC: ABNORMAL PG/ML (ref 0–1800)
PLATELET # BLD AUTO: 173 10*3/MM3 (ref 140–450)
PMV BLD AUTO: 11.4 FL (ref 6–12)
POTASSIUM SERPL-SCNC: 4.6 MMOL/L (ref 3.5–5.2)
PROT SERPL-MCNC: 6.1 G/DL (ref 6–8.5)
QT INTERVAL: 436 MS
QTC INTERVAL: 465 MS
RBC # BLD AUTO: 3.46 10*6/MM3 (ref 3.77–5.28)
RSV RNA NPH QL NAA+NON-PROBE: NOT DETECTED
SARS-COV-2 RNA RESP QL NAA+PROBE: NOT DETECTED
SODIUM SERPL-SCNC: 141 MMOL/L (ref 136–145)
TROPONIN T DELTA: -4 NG/L
TROPONIN T SERPL HS-MCNC: 67 NG/L
WBC NRBC COR # BLD AUTO: 6.77 10*3/MM3 (ref 3.4–10.8)
WHOLE BLOOD HOLD COAG: NORMAL
WHOLE BLOOD HOLD SPECIMEN: NORMAL

## 2024-07-15 PROCEDURE — 36415 COLL VENOUS BLD VENIPUNCTURE: CPT

## 2024-07-15 PROCEDURE — 87637 SARSCOV2&INF A&B&RSV AMP PRB: CPT | Performed by: EMERGENCY MEDICINE

## 2024-07-15 PROCEDURE — 83880 ASSAY OF NATRIURETIC PEPTIDE: CPT | Performed by: EMERGENCY MEDICINE

## 2024-07-15 PROCEDURE — 99284 EMERGENCY DEPT VISIT MOD MDM: CPT

## 2024-07-15 PROCEDURE — 93005 ELECTROCARDIOGRAM TRACING: CPT

## 2024-07-15 PROCEDURE — 93005 ELECTROCARDIOGRAM TRACING: CPT | Performed by: EMERGENCY MEDICINE

## 2024-07-15 PROCEDURE — 80053 COMPREHEN METABOLIC PANEL: CPT | Performed by: EMERGENCY MEDICINE

## 2024-07-15 PROCEDURE — 71045 X-RAY EXAM CHEST 1 VIEW: CPT

## 2024-07-15 PROCEDURE — 85025 COMPLETE CBC W/AUTO DIFF WBC: CPT | Performed by: EMERGENCY MEDICINE

## 2024-07-15 PROCEDURE — 84484 ASSAY OF TROPONIN QUANT: CPT | Performed by: EMERGENCY MEDICINE

## 2024-07-15 RX ORDER — SODIUM CHLORIDE 0.9 % (FLUSH) 0.9 %
10 SYRINGE (ML) INJECTION AS NEEDED
Status: DISCONTINUED | OUTPATIENT
Start: 2024-07-15 | End: 2024-07-15 | Stop reason: HOSPADM

## 2024-07-15 NOTE — ED PROVIDER NOTES
Time: 9:54 AM EDT  Date of encounter:  7/15/2024  Independent Historian/Clinical History and Information was obtained by:   Patient    History is limited by: N/A    Chief Complaint: Shortness of breath      History of Present Illness:  Patient is a 89 y.o. year old female who presents to the emergency department for evaluation of shortness of breath.  Patient has a history of lung cancer, COPD, CKD on dialysis, CHF who presents with complaints of shortness of breath.  States that she woke up around 3 AM and felt short of breath and took a breathing treatment.  States that she felt a little bit better and went back to bed.  States she woke up around 7 AM this morning and got worse.  States that she is due for dialysis today.  Denies fever, nausea, vomiting.  No other complaints this time.  Her dialysis doctor is Dr. García    HPI    Patient Care Team  Primary Care Provider: Brennan Mosqueda MD    Past Medical History:     No Known Allergies  Past Medical History:   Diagnosis Date    Allergic rhinitis     Anaphylactic shock, unspecified, initial encounter 12/05/2023    Anemia     Arthritis     Asthma     USE INHALERS AND NEBULIZERS    Back pain     Bladder disorder     Cancer     LEFT LUNG CANCER 2005 SURGERY AND CHEMO DONE  AND CURRENTLY 4/ 14/22  RIGHT LUNG CANCER HAS ONLY RECEIVED RADIATION THUS FAR    Chronic kidney disease     stage 3    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 DENIES CP BUT DOES HAVE SOA CHRONIC ISSUE COPD/LUNG CANCER    COPD (chronic obstructive pulmonary disease)     FOLLOWED BY DR MARIAJOSE BAILEY    Coronary artery disease     DENIES CP BUT DOES GET SOA MOST OF THE TIME WITH EXERTION BUT OCC AT REST CHRONIC ISSUE COPD/LUNG CANCER    Deep vein thrombosis     Diabetes mellitus     DOES NOT CHECK BS DAILY    Disease of thyroid  gland     HYPOTHYROIDISM    Essential hypertension     Gastric ulcer     GERD (gastroesophageal reflux disease)     Heart murmur     History of transfusion     NO ISSUES POST TRANSFUSION WAS MANY YEARS AGO    Hyperlipemia     Leukocytopenia     FOLLOWED BY DR MIR BAILEY    Limb swelling     Lumbago     Lumbar spinal stenosis     Lung cancer     Migraine headache     Multiple joint pain     On home oxygen therapy     3L/NC PRN    Osteopenia     PNA (pneumonia) 05/04/2024    Pseudomonas pneumonia 04/29/2024    Reflux esophagitis     Shortness of breath     Thyroid nodule     HAS ULTRASOUND YEARLY BEING MONITORED    Vascular disease     Vitamin D deficiency      Past Surgical History:   Procedure Laterality Date    ABDOMINAL SURGERY      ANGIOPLASTY RENAL ARTERY Left 06/14/2024    stent placement    APPENDECTOMY      ARTERIOGRAM N/A 6/14/2024    Procedure: Arteriogram, right radial access, aortogram and renal arteriogram with possible intervention.;  Surgeon: Dio Miguel MD;  Location: [facility] CATH INVASIVE LOCATION;  Service: Peripheral Vascular;  Laterality: N/A;    ARTERIOVENOUS FISTULA/SHUNT SURGERY Left 05/10/2022    Procedure: Left basilic vein transposition;  Surgeon: Wan Barksdale MD;  Location: [facility] MAIN OR;  Service: Vascular;  Laterality: Left;    ARTERIOVENOUS FISTULA/SHUNT SURGERY Left 03/23/2023    Procedure: Ligation of left arm arteriovenous fistula;  Surgeon: Wan Barksdale MD;  Location: [facility] MAIN OR;  Service: Vascular;  Laterality: Left;    ARTERIOVENOUS FISTULA/SHUNT SURGERY Left 11/30/2023    Procedure: Creation of left arm arteriovenous graft;  Surgeon: Wan Barksdale MD;  Location: [facility] MAIN OR;  Service: Vascular;  Laterality: Left;    BRONCHOSCOPY N/A 04/24/2024    Procedure: BRONCHOSCOPY WITH BAL AND WASHINGS;  Surgeon: Khalif Yanez MD;  Location: [facility] ENDOSCOPY;  Service: Pulmonary;  Laterality: N/A;  MUCUS PLUGGING    CARDIAC CATHETERIZATION  1996    CARDIAC SURGERY       CARDIAC SURGERY      fluid drained from heart    CATARACT EXTRACTION, BILATERAL  2003    COLONOSCOPY  2014    ENDOSCOPY  2016 2019    FEMORAL ARTERY STENT Bilateral     HYSTERECTOMY      LUNG BIOPSY Left 2005    lobectomy upper lung caner    LUNG VOLUME REDUCTION      OTHER SURGICAL HISTORY      artifical joints/limbs    REPLACEMENT TOTAL KNEE Left 2016    UPPER GASTROINTESTINAL ENDOSCOPY       Family History   Problem Relation Age of Onset    Arthritis Mother     Arthritis Father     Cancer Brother     Diabetes Brother     Other Brother         blood disease     Prostate cancer Brother     Cancer Brother     Prostate cancer Brother     Cancer Brother     Cancer Brother     Malig Hyperthermia Neg Hx     Colon cancer Neg Hx        Home Medications:  Prior to Admission medications    Medication Sig Start Date End Date Taking? Authorizing Provider   acetaminophen (TYLENOL) 325 MG tablet Take 2 tablets by mouth Every 6 (Six) Hours As Needed for Mild Pain.    Sarah Parker MD   albuterol sulfate  (90 Base) MCG/ACT inhaler Inhale 2 puffs Every 4 (Four) Hours As Needed for Wheezing.    Sarah Parker MD   aspirin 81 MG EC tablet Take 1 tablet by mouth Daily.    Sarah Parker MD   budesonide (PULMICORT) 0.5 MG/2ML nebulizer solution Take 2 mL by nebulization Daily.    Sarah Parker MD   budesonide (Pulmicort) 0.5 MG/2ML nebulizer solution Take 2 mL by nebulization Daily. 6/19/24   Brennan Mosqueda MD   carvedilol (COREG) 12.5 MG tablet Take 1 tablet by mouth 2 (Two) Times a Day With Meals for 30 days. 5/7/24 7/2/24  Rafael Lyons MD   clopidogrel (Plavix) 75 MG tablet Take 1 tablet by mouth Daily. 6/15/24 6/15/25  Dio Miguel MD   dexlansoprazole (DEXILANT) 60 MG capsule Take 1 capsule by mouth Daily. 9/23/22   Sarah Parker MD   furosemide (LASIX) 20 MG tablet Take 1 tablet by mouth Daily. 6/18/24   Halima Quick APRN   HYDROcodone-acetaminophen (Norco) 5-325 MG  per tablet Take 1 tablet by mouth Every 8 (Eight) Hours As Needed for Moderate Pain. 6/19/24   Brennan Mosqueda MD   ipratropium-albuterol (DUO-NEB) 0.5-2.5 mg/3 ml nebulizer Take 3 mL by nebulization Every 4 (Four) Hours As Needed for Shortness of Air or Wheezing for up to 30 days. 4/29/24 7/2/24  Rafael Lyons MD   isosorbide dinitrate (ISORDIL) 20 MG tablet Take 1 tablet by mouth 3 (Three) Times a Day for 30 days. 4/29/24 7/2/24  Rafael Lyons MD   levocetirizine (XYZAL) 5 MG tablet TAKE 1 TABLET DAILY  Patient taking differently: Take 1 tablet by mouth Every Evening. 6/23/23   Paloma James MD   levothyroxine (Synthroid) 50 MCG tablet Take 0.5 tablets by mouth Daily. 7/2/24   Maria Teresa Malhotra MD   linagliptin (Tradjenta) 5 MG tablet tablet Take 1 tablet by mouth Daily. 1/10/22   Paloma James MD   losartan (COZAAR) 100 MG tablet Take 1 tablet by mouth Daily for 30 days. 6/4/24 7/4/24  Pieter Salgado MD   Methylcellulose, Laxative, (FIBER THERAPY PO) Take 2 tablets by mouth Daily.    Sarah Parker MD   nitroglycerin (NITROSTAT) 0.4 MG SL tablet Place 1 tablet under the tongue Every 5 (Five) Minutes As Needed for Chest Pain.    Sarah Parker MD   rosuvastatin (CRESTOR) 20 MG tablet Take 1 tablet by mouth Daily. 7/7/23   León Sanchez MD   saccharomyces boulardii (FLORASTOR) 250 MG capsule Take 1 capsule by mouth 2 (Two) Times a Day.  Patient not taking: Reported on 7/2/2024    Sarah Parker MD   sevelamer (RENVELA) 800 MG tablet Take 1 tablet by mouth 3 (Three) Times a Day With Meals. 10/9/23   Sarah Parker MD   spironolactone (ALDACTONE) 25 MG tablet Take 1 tablet by mouth Every Other Day. 7/2/24   Maria Teresa Malhotra MD   tobramycin (JUAN) Take 5 mL by nebulization 2 (Two) Times a Day.    Provider, MD Sarah   vitamin D (ERGOCALCIFEROL) 1.25 MG (07710 UT) capsule capsule Take 1 capsule by mouth Every 14 (Fourteen) Days.    Provider, MD Sarah  "       Social History:   Social History     Tobacco Use    Smoking status: Former     Current packs/day: 0.00     Average packs/day: 1 pack/day for 52.5 years (52.5 ttl pk-yrs)     Types: Cigarettes     Start date:      Quit date: 2004     Years since quittin.0     Passive exposure: Past    Smokeless tobacco: Never   Vaping Use    Vaping status: Never Used   Substance Use Topics    Alcohol use: Never    Drug use: Never         Review of Systems:  Review of Systems   Respiratory:  Positive for cough and shortness of breath.         Physical Exam:  BP (!) 191/50   Pulse 72   Temp 98.1 °F (36.7 °C) (Oral)   Resp 20   Ht 165.1 cm (65\")   Wt 66.6 kg (146 lb 13.2 oz)   LMP  (LMP Unknown)   SpO2 95%   BMI 24.43 kg/m²     Physical Exam  Vitals and nursing note reviewed.   Constitutional:       Appearance: Normal appearance.   HENT:      Head: Normocephalic and atraumatic.   Eyes:      General: No scleral icterus.  Cardiovascular:      Rate and Rhythm: Normal rate and regular rhythm.      Heart sounds: Normal heart sounds.   Pulmonary:      Effort: Pulmonary effort is normal.      Breath sounds: Wheezing present.   Abdominal:      Palpations: Abdomen is soft.      Tenderness: There is no abdominal tenderness.   Musculoskeletal:         General: Normal range of motion.      Cervical back: Normal range of motion.      Comments: Left upper extremity fistula with good thrill   Skin:     Findings: No rash.   Neurological:      General: No focal deficit present.      Mental Status: She is alert.                  Procedures:  Procedures      Medical Decision Making:      Comorbidities that affect care:    Chronic Kidney Disease, Congestive Heart Failure, COPD, Diabetes, Hypertension    External Notes reviewed:    Reviewed cardiology note from       The following orders were placed and all results were independently analyzed by me:  Orders Placed This Encounter   Procedures    COVID-19, FLU A/B, RSV PCR 1 " HR TAT - Swab, Nasopharynx    XR Chest 1 View    [place] Draw    Comprehensive Metabolic Panel    BNP    Single High Sensitivity Troponin T    CBC Auto Differential    High Sensitivity Troponin T 2Hr    NPO Diet NPO Type: Strict NPO    Undress & Gown    Continuous Pulse Oximetry    Vital Signs    Inpatient Nephrology Consult    Oxygen Therapy- Nasal Cannula; Titrate 1-6 LPM Per SpO2; 90 - 95%    ECG 12 Lead ED Triage Standing Order; SOA    Insert Peripheral IV    CBC & Differential    Green Top (Gel)    Lavender Top    Gold Top - SST    Light Blue Top       Medications Given in the Emergency Department:  Medications   sodium chloride 0.9 % flush 10 mL (has no administration in time range)        ED Course:    ED Course as of 07/15/24 1337   Mon Jul 15, 2024   0949 EKG interpreted by me  Time: 0 941  Heart rate 68  Sinus, LVH with nonspecific ST changes, no acute ischemia [MA]   1317 Spoke with Dr. García who will call back after speaking to the dialysis center [MA]   1321 She has an appointment at [facility] for dialysis. [MA]   1334 Recheck patient.  Discussed need to be seen for dialysis at [facility].  Patient now also reporting that she was dizzy.  States that it she was dizzy when she was standing but is improved when she is sitting down.  Family also reports that her blood pressure was a little low when they were doing physical therapy earlier in the week.  I think this might be due to fluctuations in her blood pressure.  She is hypertensive here but likely due to her being a dialysis patient.  She is stable enough for outpatient dialysis.  Should she have new or worsening symptoms return to emergency room. [MA]      ED Course User Index  [MA] Kashmir Garza MD       Labs:    Lab Results (last 24 hours)       Procedure Component Value Units Date/Time    CBC & Differential [[id number]]  (Abnormal) Collected: 07/15/24 0954    Specimen: Blood Updated: 07/15/24 1002    Narrative:      The following orders were created for  panel order CBC & Differential.  Procedure                               Abnormality         Status                     ---------                               -----------         ------                     CBC Auto Differential[[id number]]        Abnormal            Final result                 Please view results for these tests on the individual orders.    Comprehensive Metabolic Panel [[id number]]  (Abnormal) Collected: 07/15/24 0954    Specimen: Blood Updated: 07/15/24 1026     Glucose 124 mg/dL      BUN 39 mg/dL      Creatinine 4.18 mg/dL      Sodium 141 mmol/L      Potassium 4.6 mmol/L      Comment: Slight hemolysis detected by analyzer. Result may be falsely elevated.        Chloride 98 mmol/L      CO2 31.6 mmol/L      Calcium 8.5 mg/dL      Total Protein 6.1 g/dL      Albumin 3.8 g/dL      ALT (SGPT) 7 U/L      AST (SGOT) 20 U/L      Comment: Slight hemolysis detected by analyzer. Result may be falsely elevated.        Alkaline Phosphatase 159 U/L      Total Bilirubin 0.3 mg/dL      Globulin 2.3 gm/dL      A/G Ratio 1.7 g/dL      BUN/Creatinine Ratio 9.3     Anion Gap 11.4 mmol/L      eGFR 9.8 mL/min/1.73      Comment: <15 Indicative of kidney failure       Narrative:      GFR Normal >60  Chronic Kidney Disease <60  Kidney Failure <15    The GFR formula is only valid for adults with stable renal function between ages 18 and 70.    BNP [[id number]]  (Abnormal) Collected: 07/15/24 0954    Specimen: Blood Updated: 07/15/24 1021     proBNP 25,718.0 pg/mL     Narrative:      This assay is used as an aid in the diagnosis of individuals suspected of having heart failure. It can be used as an aid in the diagnosis of acute decompensated heart failure (ADHF) in patients presenting with signs and symptoms of ADHF to the emergency department (ED). In addition, NT-proBNP of <300 pg/mL indicates ADHF is not likely.    Age Range Result Interpretation  NT-proBNP Concentration (pg/mL:      <50             Positive             >450                   Gray                 300-450                    Negative             <300    50-75           Positive            >900                  Gray                300-900                  Negative            <300      >75             Positive            >1800                  Gray                300-1800                  Negative            <300    Single High Sensitivity Troponin T [[id number]]  (Abnormal) Collected: 07/15/24 0954    Specimen: Blood Updated: 07/15/24 1040     HS Troponin T 67 ng/L     Narrative:      High Sensitive Troponin T Reference Range:  <14.0 ng/L- Negative Female for AMI  <22.0 ng/L- Negative Male for AMI  >=14 - Abnormal Female indicating possible myocardial injury.  >=22 - Abnormal Male indicating possible myocardial injury.   Clinicians would have to utilize clinical acumen, EKG, Troponin, and serial changes to determine if it is an Acute Myocardial Infarction or myocardial injury due to an underlying chronic condition.         CBC Auto Differential [[id number]]  (Abnormal) Collected: 07/15/24 0954    Specimen: Blood Updated: 07/15/24 1002     WBC 6.77 10*3/mm3      RBC 3.46 10*6/mm3      Hemoglobin 10.7 g/dL      Hematocrit 35.5 %      .6 fL      MCH 30.9 pg      MCHC 30.1 g/dL      RDW 14.8 %      RDW-SD 55.5 fl      MPV 11.4 fL      Platelets 173 10*3/mm3      Neutrophil % 75.7 %      Lymphocyte % 10.6 %      Monocyte % 12.1 %      Eosinophil % 0.9 %      Basophil % 0.4 %      Immature Grans % 0.3 %      Neutrophils, Absolute 5.12 10*3/mm3      Lymphocytes, Absolute 0.72 10*3/mm3      Monocytes, Absolute 0.82 10*3/mm3      Eosinophils, Absolute 0.06 10*3/mm3      Basophils, Absolute 0.03 10*3/mm3      Immature Grans, Absolute 0.02 10*3/mm3      nRBC 0.0 /100 WBC     COVID-19, FLU A/B, RSV PCR 1 HR TAT - Swab, Nasopharynx [[id number]]  (Normal) Collected: 07/15/24 1134    Specimen: Swab from Nasopharynx Updated: 07/15/24 1217     COVID19 Not Detected     Influenza A  PCR Not Detected     Influenza B PCR Not Detected     RSV, PCR Not Detected    Narrative:      Fact sheet for providers: https://www.fda.gov/media/652856/download    Fact sheet for patients: https://www.fda.gov/media/307790/download    Test performed by PCR.    High Sensitivity Troponin T 2Hr [[id number]]  (Abnormal) Collected: 07/15/24 1222    Specimen: Blood Updated: 07/15/24 1307     HS Troponin T 63 ng/L      Troponin T Delta -4 ng/L     Narrative:      High Sensitive Troponin T Reference Range:  <14.0 ng/L- Negative Female for AMI  <22.0 ng/L- Negative Male for AMI  >=14 - Abnormal Female indicating possible myocardial injury.  >=22 - Abnormal Male indicating possible myocardial injury.   Clinicians would have to utilize clinical acumen, EKG, Troponin, and serial changes to determine if it is an Acute Myocardial Infarction or myocardial injury due to an underlying chronic condition.                  Imaging:    XR Chest 1 View    Result Date: 7/15/2024  XR CHEST 1 VW Date of Exam: 7/15/2024 9:53 AM EDT Indication: SOA Triage Protocol Comparison: 5/31/2024 Findings: Cardiomediastinal silhouette is obscured by left lung airspace disease/opacification. There is a persistent right midlung opacity, similar to prior examination likely neoplastic or effects of therapy. There is similar irregular opacification of the left lung with effusion. There is a new small right pleural effusion. No pneumothorax. No acute osseous abnormality identified.     Impression: 1.New small right pleural effusion. No significant change otherwise noted. Electronically Signed: Pete Root MD  7/15/2024 10:07 AM EDT  Workstation ID: [id number]       Differential Diagnosis and Discussion:    Dyspnea: Differential diagnosis includes but is not limited to metabolic acidosis, neurological disorders, psychogenic, asthma, pneumothorax, upper airway obstruction, COPD, pneumonia, noncardiogenic pulmonary edema, interstitial lung disease, anemia,  congestive heart failure, and pulmonary embolism    All labs were reviewed and interpreted by me.  All X-rays impressions were independently interpreted by me.  EKG was interpreted by me.    MDM     Amount and/or Complexity of Data Reviewed  Clinical lab tests: reviewed  Tests in the radiology section of CPT®: reviewed  Tests in the medicine section of CPT®: reviewed  Decide to obtain previous medical records or to obtain history from someone other than the patient: yes       Patient 89-year-old female who presents with complaints of shortness of breath.  Has a history of COPD, CKD, CHF.  States that she has been short of breath for the last couple days.  She also reported that she was lightheaded when I evaluated her right for discharge.  To me she has some lower extremity edema as well as a new pleural effusion.  She does not appear to be severely fluid overloaded needing admission but does appear to that she needs dialysis.  She has been stable here on her normal oxygen as well as her vitals been unremarkable.  Troponins are not significantly elevated and are downtrending.  Clinically she appears to be stable enough for dialysis at [facility] today.  From the lightheaded standpoint I think this is more blood pressure as well as her fluid.  She will need to be followed up as an outpatient.  I think she is stable for discharge at this time and to be followed up as an outpatient.  Should she have new or worsening symptoms return to the emergency room.          Patient Care Considerations:          Consultants/Shared Management Plan:    Consultant: I have discussed the case with Dr. García who states has arranged for outpatient dialysis at [facility]    Social Determinants of Health:    Patient is independent, reliable, and has access to care.       Disposition and Care Coordination:    Discharged: The patient is suitable and stable for discharge with no need for consideration of admission.    I have explained the patient´s  condition, diagnoses and treatment plan based on the information available to me at this time. I have answered questions and addressed any concerns. The patient has a good  understanding of the patient´s diagnosis, condition, and treatment plan as can be expected at this point. The vital signs have been stable. The patient´s condition is stable and appropriate for discharge from the emergency department.      The patient will pursue further outpatient evaluation with the primary care physician or other designated or consulting physician as outlined in the discharge instructions. They are agreeable to this plan of care and follow-up instructions have been explained in detail. The patient has received these instructions in written format and have expressed an understanding of the discharge instructions. The patient is aware that any significant change in condition or worsening of symptoms should prompt an immediate return to this or the closest emergency department or call to 911.      Final diagnoses:   End stage renal disease   Shortness of breath        ED Disposition       ED Disposition   Discharge    Condition   Stable    Comment   --               This medical record created using voice recognition software.             Kashmir Garza MD  07/15/24 0814

## 2024-07-16 ENCOUNTER — OFFICE VISIT (OUTPATIENT)
Dept: INTERNAL MEDICINE | Age: 89
End: 2024-07-16
Payer: MEDICARE

## 2024-07-16 VITALS
HEIGHT: 65 IN | DIASTOLIC BLOOD PRESSURE: 51 MMHG | WEIGHT: 139 LBS | HEART RATE: 74 BPM | BODY MASS INDEX: 23.16 KG/M2 | TEMPERATURE: 98.2 F | SYSTOLIC BLOOD PRESSURE: 168 MMHG | OXYGEN SATURATION: 84 %

## 2024-07-16 DIAGNOSIS — I50.32 CHRONIC HEART FAILURE WITH PRESERVED EJECTION FRACTION: ICD-10-CM

## 2024-07-16 DIAGNOSIS — I25.10 CORONARY ARTERY CALCIFICATION SEEN ON CT SCAN: ICD-10-CM

## 2024-07-16 DIAGNOSIS — N18.6 END STAGE RENAL DISEASE ON DIALYSIS: ICD-10-CM

## 2024-07-16 DIAGNOSIS — I10 ESSENTIAL HYPERTENSION: ICD-10-CM

## 2024-07-16 DIAGNOSIS — Z99.2 END STAGE RENAL DISEASE ON DIALYSIS: ICD-10-CM

## 2024-07-16 DIAGNOSIS — J44.0 COPD WITH LOWER RESPIRATORY INFECTION: ICD-10-CM

## 2024-07-16 DIAGNOSIS — D47.2 MONOCLONAL PARAPROTEINEMIA: ICD-10-CM

## 2024-07-16 DIAGNOSIS — Z98.62 S/P RENAL ARTERY ANGIOPLASTY: ICD-10-CM

## 2024-07-16 DIAGNOSIS — E55.9 VITAMIN D DEFICIENCY: ICD-10-CM

## 2024-07-16 DIAGNOSIS — D63.1 ANEMIA OF CHRONIC RENAL FAILURE, STAGE 4 (SEVERE): ICD-10-CM

## 2024-07-16 DIAGNOSIS — K21.9 GASTROESOPHAGEAL REFLUX DISEASE WITHOUT ESOPHAGITIS: ICD-10-CM

## 2024-07-16 DIAGNOSIS — E03.9 HYPOTHYROIDISM (ACQUIRED): ICD-10-CM

## 2024-07-16 DIAGNOSIS — Z00.00 MEDICARE ANNUAL WELLNESS VISIT, SUBSEQUENT: Primary | ICD-10-CM

## 2024-07-16 DIAGNOSIS — E11.51 TYPE 2 DIABETES MELLITUS WITH DIABETIC PERIPHERAL ANGIOPATHY WITHOUT GANGRENE, WITHOUT LONG-TERM CURRENT USE OF INSULIN: ICD-10-CM

## 2024-07-16 DIAGNOSIS — R54 FRAILTY SYNDROME IN GERIATRIC PATIENT: ICD-10-CM

## 2024-07-16 DIAGNOSIS — N18.4 ANEMIA OF CHRONIC RENAL FAILURE, STAGE 4 (SEVERE): ICD-10-CM

## 2024-07-16 DIAGNOSIS — I70.1 RENAL ARTERY STENOSIS: ICD-10-CM

## 2024-07-16 DIAGNOSIS — Z99.81 OXYGEN DEPENDENT: ICD-10-CM

## 2024-07-16 DIAGNOSIS — I73.9 PERIPHERAL VASCULAR DISEASE OF LOWER EXTREMITY: ICD-10-CM

## 2024-07-16 PROCEDURE — 1125F AMNT PAIN NOTED PAIN PRSNT: CPT | Performed by: INTERNAL MEDICINE

## 2024-07-16 PROCEDURE — 96160 PT-FOCUSED HLTH RISK ASSMT: CPT | Performed by: INTERNAL MEDICINE

## 2024-07-16 PROCEDURE — 1170F FXNL STATUS ASSESSED: CPT | Performed by: INTERNAL MEDICINE

## 2024-07-16 PROCEDURE — 99214 OFFICE O/P EST MOD 30 MIN: CPT | Performed by: INTERNAL MEDICINE

## 2024-07-16 PROCEDURE — G0439 PPPS, SUBSEQ VISIT: HCPCS | Performed by: INTERNAL MEDICINE

## 2024-07-16 RX ORDER — ISOSORBIDE MONONITRATE 30 MG/1
30 TABLET, EXTENDED RELEASE ORAL DAILY
Qty: 90 TABLET | Refills: 3 | Status: SHIPPED | OUTPATIENT
Start: 2024-07-16

## 2024-07-16 RX ORDER — LOSARTAN POTASSIUM 50 MG/1
50 TABLET ORAL 2 TIMES DAILY
Qty: 180 TABLET | Refills: 3 | Status: SHIPPED | OUTPATIENT
Start: 2024-07-16

## 2024-07-16 NOTE — PROGRESS NOTES
CHIEF COMPLAINT/ HPI:  Medicare Wellness-subsequent (Medicare Wellness, lab follow up. Med reconciliation/ Refill. Asking if Isosorbide ,Tobramycin and Losartan need to be continued and dose confirmation. Also asking for Florastar if needed, she did get while in the hospital. )    Patient was recently in the hospital May 30 through June 4, 2024, she is a patient of Dr. 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, she had hypertension issues and she is on dialysis, they also described her as having a type II non-ST elevation myocardial infarction and according to her hospital course,----------- she had previous lung cancer with resection and chemo and radiation on the right and the resection and chemo for the left lung cancer, she has been to rehab several times over the past couple months, she went home at 1 point came right back to the hospital within several days due to shortness of breath, she recently had renal artery stenting on the left side as an outpatient with vascular surgery here locally    pleasant 89-year-old female who is very quiet unable to give much history, she is in no distress but she is very weak appearing, she can barely move her arms to my command, she is wearing oxygen, she is keeps pointing to her dialysis catheter, she tells me she has been  for 67 years and lives in [facility] with her , according to her discharge summary she was in our hospital May 1 through May 7, 2024 Hospital course showed she has end-stage renal disease at baseline with chronic anemia chronic kidney disease and lung cancer, she came in with chest discomfort cough weakness and confusion, and she has been admitted several times in the past 3 to 4 months to our hospital, according to the discharge summary she keeps coming back to the hospital with nonspecific  "complaints and she was diagnosed with Pseudomonas pneumonia and has been on oral and IV antibiotics previous, her workup was essentially unremarkable according to the discharge summary but the family could not take care of her anymore she finally agreed to coming into the nursing home,          Objective   Vital Signs  Vitals:    07/16/24 1316   BP: 168/51   Pulse: 74   Temp: 98.2 °F (36.8 °C)   SpO2: (!) 84%   Weight: 63 kg (139 lb)   Height: 165.1 cm (65\")      Body mass index is 23.13 kg/m².  Review of Systems   Constitutional: Negative.    HENT: Negative.  Positive for hearing loss.    Eyes: Negative.    Respiratory: Negative.  Positive for shortness of breath.    Cardiovascular: Negative.  Positive for leg swelling.   Gastrointestinal: Negative.    Endocrine: Negative.    Genitourinary: Negative.    Musculoskeletal: Negative.    Allergic/Immunologic: Negative.    Neurological: Negative.  Positive for weakness.   Hematological: Negative.    Psychiatric/Behavioral: Negative.        Physical Exam  Constitutional:       General: She is not in acute distress.     Appearance: Normal appearance.   HENT:      Head: Normocephalic.      Right Ear: There is impacted cerumen.      Left Ear: There is impacted cerumen.      Mouth/Throat:      Mouth: Mucous membranes are moist.   Eyes:      Conjunctiva/sclera: Conjunctivae normal.      Pupils: Pupils are equal, round, and reactive to light.   Cardiovascular:      Rate and Rhythm: Normal rate and regular rhythm.      Pulses: Normal pulses.      Heart sounds: Murmur heard.   Pulmonary:      Effort: Pulmonary effort is normal.      Breath sounds: Normal breath sounds.   Musculoskeletal:         General: No swelling. Normal range of motion.      Cervical back: Neck supple.      Right lower leg: Edema present.      Left lower leg: Edema present.   Skin:     General: Skin is warm and dry.      Coloration: Skin is not jaundiced.   Neurological:      General: No focal deficit " present.      Mental Status: She is alert and oriented to person, place, and time. Mental status is at baseline.   Psychiatric:         Mood and Affect: Mood normal.         Behavior: Behavior normal.         Thought Content: Thought content normal.         Judgment: Judgment normal.        Result Review :   Lab Results   Component Value Date    PROBNP 25,718.0 (H) 07/15/2024    PROBNP 29,939.0 (H) 06/27/2024    PROBNP 18,671.0 (H) 05/30/2024     09/09/2019     04/11/2019     CMP          6/27/2024    09:46 7/2/2024    14:02 7/15/2024    09:54   CMP   Glucose 120  124  124    BUN 15  23  39    Creatinine 2.37  2.80  4.18    EGFR 19.4  15.9  9.8    Sodium 141  140  141    Potassium 4.2  3.7  4.6    Chloride 98  96  98    Calcium 9.1  8.5  8.5    Total Protein  5.7     Total Protein 6.1  5.9  6.1    Albumin 3.9  3.7     3.3  3.8    Globulin  2.4     Globulin 2.2  2.2  2.3    Total Bilirubin 0.3  0.3  0.3    Alkaline Phosphatase 191  164  159    AST (SGOT) 16  13  20    ALT (SGPT) <5  7  7    Albumin/Globulin Ratio 1.8  1.7  1.7    BUN/Creatinine Ratio 6.3  8.2  9.3    Anion Gap 7.6  9.7  11.4      CBC w/diff          7/3/2024    00:00 7/10/2024    00:00 7/15/2024    09:54   CBC w/Diff   WBC   6.77    RBC   3.46    Hemoglobin 9.2        27.6     10.1        30.3     10.7    Hematocrit   35.5    MCV   102.6    MCH   30.9    MCHC   30.1    RDW   14.8    Platelets   173    Neutrophil Rel %   75.7    Immature Granulocyte Rel %   0.3    Lymphocyte Rel %   10.6    Monocyte Rel %   12.1    Eosinophil Rel %   0.9    Basophil Rel %   0.4       Details          This result is from an external source.              Lipid Panel          7/26/2023    08:17 6/27/2024    09:46   Lipid Panel   Total Cholesterol 112  126    Triglycerides 83  55    HDL Cholesterol 60  88    VLDL Cholesterol 16  12    LDL Cholesterol  36  26    LDL/HDL Ratio 0.59  0.31       Lab Results   Component Value Date    TSH 7.250 (H) 06/27/2024     TSH 3.630 05/01/2024    TSH 1.650 10/29/2023      Lab Results   Component Value Date    FREET4 1.28 06/27/2024    FREET4 1.34 05/01/2024    FREET4 1.4 08/25/2020      A1C Last 3 Results          10/30/2023    16:05 4/19/2024    00:00 5/29/2024    00:00   HGBA1C Last 3 Results   Hemoglobin A1C 7.80  5.7     5.4          Details          This result is from an external source.                               Visit Diagnoses:    ICD-10-CM ICD-9-CM   1. Medicare annual wellness visit, subsequent  Z00.00 V70.0   2. Gastroesophageal reflux disease without esophagitis  K21.9 530.81   3. Vitamin D deficiency  E55.9 268.9   4. S/P renal artery angioplasty  Z98.62 V45.89   5. Coronary artery calcification seen on CT scan  I25.10 414.00   6. Peripheral vascular disease of lower extremity  I73.9 443.9   7. Essential hypertension  I10 401.9   8. Chronic heart failure with preserved ejection fraction  I50.32 428.9   9. Renal artery stenosis  I70.1 440.1   10. Hypothyroidism (acquired)  E03.9 244.9   11. Oxygen dependent  Z99.81 V46.2   12. Frailty syndrome in geriatric patient  R54 797   13. COPD with lower respiratory infection  J44.0 496     519.8   14. Monoclonal paraproteinemia  D47.2 273.1   15. End stage renal disease on dialysis  N18.6 585.6    Z99.2 V45.11   16. Type 2 diabetes mellitus with diabetic peripheral angiopathy without gangrene, without long-term current use of insulin  E11.51 250.70     443.81   17. Anemia of chronic renal failure, stage 4 (severe)  N18.4 285.21    D63.1 585.4       Assessment and Plan   Diagnoses and all orders for this visit:    1. Medicare annual wellness visit, subsequent (Primary)  -     Comprehensive Metabolic Panel; Future  -     CBC & Differential; Future  -     TSH+Free T4; Future  -     Lipid Panel; Future  -     Hemoglobin A1c; Future    2. Gastroesophageal reflux disease without esophagitis  -     Comprehensive Metabolic Panel; Future  -     CBC & Differential; Future  -     TSH+Free  T4; Future  -     Lipid Panel; Future  -     Hemoglobin A1c; Future    3. Vitamin D deficiency  -     Comprehensive Metabolic Panel; Future  -     CBC & Differential; Future  -     TSH+Free T4; Future  -     Lipid Panel; Future  -     Hemoglobin A1c; Future    4. S/P renal artery angioplasty  -     Comprehensive Metabolic Panel; Future  -     CBC & Differential; Future  -     TSH+Free T4; Future  -     Lipid Panel; Future  -     Hemoglobin A1c; Future    5. Coronary artery calcification seen on CT scan  -     Comprehensive Metabolic Panel; Future  -     CBC & Differential; Future  -     TSH+Free T4; Future  -     Lipid Panel; Future  -     Hemoglobin A1c; Future    6. Peripheral vascular disease of lower extremity  -     Comprehensive Metabolic Panel; Future  -     CBC & Differential; Future  -     TSH+Free T4; Future  -     Lipid Panel; Future  -     Hemoglobin A1c; Future    7. Essential hypertension  -     Comprehensive Metabolic Panel; Future  -     CBC & Differential; Future  -     TSH+Free T4; Future  -     Lipid Panel; Future  -     Hemoglobin A1c; Future    8. Chronic heart failure with preserved ejection fraction  -     Comprehensive Metabolic Panel; Future  -     CBC & Differential; Future  -     TSH+Free T4; Future  -     Lipid Panel; Future  -     Hemoglobin A1c; Future    9. Renal artery stenosis  -     Comprehensive Metabolic Panel; Future  -     CBC & Differential; Future  -     TSH+Free T4; Future  -     Lipid Panel; Future  -     Hemoglobin A1c; Future    10. Hypothyroidism (acquired)  -     Comprehensive Metabolic Panel; Future  -     CBC & Differential; Future  -     TSH+Free T4; Future  -     Lipid Panel; Future  -     Hemoglobin A1c; Future    11. Oxygen dependent  -     Comprehensive Metabolic Panel; Future  -     CBC & Differential; Future  -     TSH+Free T4; Future  -     Lipid Panel; Future  -     Hemoglobin A1c; Future  -     Ambulatory Referral to Pulmonology    12. Frailty syndrome in  geriatric patient  -     Comprehensive Metabolic Panel; Future  -     CBC & Differential; Future  -     TSH+Free T4; Future  -     Lipid Panel; Future  -     Hemoglobin A1c; Future    13. COPD with lower respiratory infection  -     Comprehensive Metabolic Panel; Future  -     CBC & Differential; Future  -     TSH+Free T4; Future  -     Lipid Panel; Future  -     Hemoglobin A1c; Future  -     Ambulatory Referral to Pulmonology    14. Monoclonal paraproteinemia  -     Comprehensive Metabolic Panel; Future  -     CBC & Differential; Future  -     TSH+Free T4; Future  -     Lipid Panel; Future  -     Hemoglobin A1c; Future    15. End stage renal disease on dialysis  -     Comprehensive Metabolic Panel; Future  -     CBC & Differential; Future  -     TSH+Free T4; Future  -     Lipid Panel; Future  -     Hemoglobin A1c; Future    16. Type 2 diabetes mellitus with diabetic peripheral angiopathy without gangrene, without long-term current use of insulin  -     Comprehensive Metabolic Panel; Future  -     CBC & Differential; Future  -     TSH+Free T4; Future  -     Lipid Panel; Future  -     Hemoglobin A1c; Future    17. Anemia of chronic renal failure, stage 4 (severe)  -     Comprehensive Metabolic Panel; Future  -     CBC & Differential; Future  -     TSH+Free T4; Future  -     Lipid Panel; Future  -     Hemoglobin A1c; Future    Other orders  -     isosorbide mononitrate (IMDUR) 30 MG 24 hr tablet; Take 1 tablet by mouth Daily.  Dispense: 90 tablet; Refill: 3  -     losartan (Cozaar) 50 MG tablet; Take 1 tablet by mouth 2 (Two) Times a Day.  Dispense: 180 tablet; Refill: 3    Annual wellness visit completed July 16, 2024    Bilateral cerumen impactions left greater than right removed July 16, 2024    Acute shortness of breath, July 15, 2024 went to the emergency room was sent from there to dialysis,    Back pain, patient was asking for some pain medications discussed our role in neck care, can make referral to pain  management, for possible narcotic usage on a long-term basis,    Generalized weakness     Frailty discussed with her frailty weakness that I would stop Trulicity completely which we have done and will monitor her sugars with Accu-Cheks continue Tradjenta as below,    End-stage renal disease ----on dialysis with tunneled dialysis catheter in the right upper chest wall, continues renal replacement with sevelamer carbonate 800 mg 3 times a day,     Hypoxia continues on oxygen, previous pneumonia,    Previous pneumonia, question about continuing her tobramycin nebulizer solutions?  July 16, 2024    Diabetes, continues Tradjenta 5 mg daily,,     Coronary artery disease, continues carvedilol 12.5 mg twice a day, aspirin 81 mg daily, Cozaar 50 mg daily, Imdur 30 mg daily----does follow-up with [facility]    GERD, continues Dexilant 60 mg daily and probiotics Florastor 250 mg twice a day    COPD, continues Brovana nebs twice a day, DuoNebs 4 times a day, ProAir inhaler as needed, Pulmicort nebs twice a day    Hypertension    Anemia chronic disease state will follow labs    PAD    Hyperlipidemia, continues rosuvastatin 20 mg daily    Lung cancer history--2005, chemo x 3 for right side, and partial lobectomy on the left side for recurrence of cancer,       Follow Up   Return in about 4 months (around 11/16/2024).  Patient was given instructions and counseling regarding her condition or for health maintenance advice. Please see specific information pulled into the AVS if appropriate.

## 2024-07-16 NOTE — ASSESSMENT & PLAN NOTE
Hypertension is borderline  Medication changes per orders.  Blood pressure will be reassessedin 3 months.

## 2024-07-16 NOTE — ASSESSMENT & PLAN NOTE
COPD is stable.    Plan:  Continue same medication/s without change.    Discussed medication dosage, use, side effects, and goals of treatment in detail.    Warning signs of respiratory distress were reviewed with the patient.   Discussed distinction between quick-relief and maintenance control medications..    Patient Treatment Goals:   symptom prevention, minimizing limitation in activity, prevention of exacerbations and use of ER/inpatient care, maintenance of optimal pulmonary function, and minimization of adverse effects of treatment    Followup 4 months.

## 2024-07-16 NOTE — ASSESSMENT & PLAN NOTE
Diabetes is stable.   Continue current treatment regimen.  Discussed ways to avoid symptomatic hypoglycemia.  Diabetes will be reassessed in 6 months

## 2024-07-16 NOTE — PROGRESS NOTES
Subjective   The ABCs of the Annual Wellness Visit  Medicare Wellness Visit      Charlette Rai is a 89 y.o. patient who presents for a Medicare Wellness Visit.    The following portions of the patient's history were reviewed and   updated as appropriate: allergies, current medications, past family history, past medical history, past social history, past surgical history, and problem list.    Compared to one year ago, the patient's physical   health is the same.  Compared to one year ago, the patient's mental   health is the same.    Recent Hospitalizations:  This patient has had a [facility] admission record on file within the last 365 days.  Current Medical Providers:  Patient Care Team:  Brennan Mosqueda MD as PCP - General (Internal Medicine)  Regis Mckeon MD as Consulting Physician (Radiation Oncology)  Carlton Casillas MD as Consulting Physician (Gastroenterology)  Susan Marcos APRN as Nurse Practitioner (Gastroenterology)    Outpatient Medications Prior to Visit   Medication Sig Dispense Refill    acetaminophen (TYLENOL) 325 MG tablet Take 2 tablets by mouth Every 6 (Six) Hours As Needed for Mild Pain.      albuterol sulfate  (90 Base) MCG/ACT inhaler Inhale 2 puffs Every 4 (Four) Hours As Needed for Wheezing.      aspirin 81 MG EC tablet Take 1 tablet by mouth Daily.      budesonide (Pulmicort) 0.5 MG/2ML nebulizer solution Take 2 mL by nebulization Daily. 60 each 5    carvedilol (COREG) 12.5 MG tablet Take 1 tablet by mouth 2 (Two) Times a Day With Meals for 30 days. 60 tablet 0    clopidogrel (Plavix) 75 MG tablet Take 1 tablet by mouth Daily. 90 tablet 3    dexlansoprazole (DEXILANT) 60 MG capsule Take 1 capsule by mouth Daily.      furosemide (LASIX) 20 MG tablet Take 1 tablet by mouth Daily. 90 tablet 3    HYDROcodone-acetaminophen (Norco) 5-325 MG per tablet Take 1 tablet by mouth Every 8 (Eight) Hours As Needed for Moderate Pain. (Patient taking differently:  Take 1 tablet by mouth Every 8 (Eight) Hours As Needed for Moderate Pain. PRN) 30 tablet 0    ipratropium-albuterol (DUO-NEB) 0.5-2.5 mg/3 ml nebulizer Take 3 mL by nebulization Every 4 (Four) Hours As Needed for Shortness of Air or Wheezing for up to 30 days. 360 mL 2    levocetirizine (XYZAL) 5 MG tablet TAKE 1 TABLET DAILY (Patient taking differently: Take 1 tablet by mouth Every Evening.) 90 tablet 1    levothyroxine (Synthroid) 50 MCG tablet Take 0.5 tablets by mouth Daily. 45 tablet 3    linagliptin (Tradjenta) 5 MG tablet tablet Take 1 tablet by mouth Daily. 90 tablet 1    losartan (COZAAR) 100 MG tablet Take 1 tablet by mouth Daily for 30 days. 30 tablet 0    Methylcellulose, Laxative, (FIBER THERAPY PO) Take 2 tablets by mouth Daily.      nitroglycerin (NITROSTAT) 0.4 MG SL tablet Place 1 tablet under the tongue Every 5 (Five) Minutes As Needed for Chest Pain.      rosuvastatin (CRESTOR) 20 MG tablet Take 1 tablet by mouth Daily. 90 tablet 3    sevelamer (RENVELA) 800 MG tablet Take 1 tablet by mouth 3 (Three) Times a Day With Meals.      spironolactone (ALDACTONE) 25 MG tablet Take 1 tablet by mouth Every Other Day. 45 tablet 3    tobramycin (JUAN) Take 5 mL by nebulization 2 (Two) Times a Day.      vitamin D (ERGOCALCIFEROL) 1.25 MG (13781 UT) capsule capsule Take 1 capsule by mouth Every 14 (Fourteen) Days.      budesonide (PULMICORT) 0.5 MG/2ML nebulizer solution Take 2 mL by nebulization Daily. (Patient not taking: Reported on 7/16/2024)      isosorbide dinitrate (ISORDIL) 20 MG tablet Take 1 tablet by mouth 3 (Three) Times a Day for 30 days. 90 tablet 0    saccharomyces boulardii (FLORASTOR) 250 MG capsule Take 1 capsule by mouth 2 (Two) Times a Day. (Patient not taking: Reported on 7/2/2024)       No facility-administered medications prior to visit.     Opioid medication/s are on active medication list.  and I have evaluated her active treatment plan and pain score trends (see table).  There were no  vitals filed for this visit.  I have reviewed the chart for potential of high risk medication and harmful drug interactions in the elderly.        Aspirin is on active medication list. Aspirin use is indicated based on review of current medical condition/s. Pros and cons of this therapy have been discussed today. Benefits of this medication outweigh potential harm.  Patient has been encouraged to continue taking this medication.  .      Patient Active Problem List   Diagnosis    Malignant neoplasm of upper lobe of right lung    Rheumatoid arthritis    Asthma    Chronic heart failure with preserved ejection fraction    Essential hypertension    GERD (gastroesophageal reflux disease)    Hyperlipidemia LDL goal <70    Lumbar spinal stenosis    Migraine    Monoclonal paraproteinemia    Osteopenia    Oxygen dependent    Peripheral neuropathy    Stage 4 chronic kidney disease    Vitamin D deficiency    Carotid artery stenosis    Chronic right-sided thoracic back pain    Peripheral vascular disease of lower extremity    Ex-smoker    De Quervain's tenosynovitis    Arthritis of carpometacarpal (CMC) joint of right thumb    Steal syndrome of dialysis vascular access    Anemia of chronic renal failure, stage 4 (severe)    Aortic stenosis, moderate    Hematoma    End stage renal disease on dialysis    Coronary artery calcification seen on CT scan    Frailty syndrome in geriatric patient    COPD with lower respiratory infection    Coagulation defect, unspecified    Hyperphosphatemia    Hypothyroidism (acquired)    Idiopathic osteoarthritis    Secondary multiple arthritis    Type 2 diabetes mellitus with diabetic peripheral angiopathy without gangrene    Acute on chronic respiratory failure with hypoxia and hypercapnia    Elevated troponin level not due myocardial infarction    Renal artery stenosis    S/P renal artery angioplasty    Atherosclerosis of renal artery     Advance Care Planning (Click this link to access ACP  "Navigator)  Advance Directive is on file.  ACP discussion was held with the patient during this visit. Patient has an advance directive in EMR which is still valid.         Objective   Vitals:    24 1316   BP: 168/51   Pulse: 74   Temp: 98.2 °F (36.8 °C)   SpO2: (!) 84%   Weight: 63 kg (139 lb)   Height: 165.1 cm (65\")       Estimated body mass index is 23.13 kg/m² as calculated from the following:    Height as of this encounter: 165.1 cm (65\").    Weight as of this encounter: 63 kg (139 lb).    BMI is within normal parameters. No other follow-up for BMI required.        Does the patient have evidence of cognitive impairment? No  Lab Results   Component Value Date    TRIG 55 2024    HDL 88 (H) 2024    LDL 26 2024    VLDL 12 2024    HGBA1C 5.4 2024                                                                                               Health  Risk Assessment    Smoking Status:  Social History     Tobacco Use   Smoking Status Former    Current packs/day: 0.00    Average packs/day: 1 pack/day for 52.5 years (52.5 ttl pk-yrs)    Types: Cigarettes    Start date:     Quit date: 2004    Years since quittin.0    Passive exposure: Past   Smokeless Tobacco Never     Alcohol Consumption:  Social History     Substance and Sexual Activity   Alcohol Use Never     Fall Risk Screen:  HATTIE Fall Risk Assessment was completed, and patient is at LOW risk for falls.Assessment completed on:2024    Depression Screenin/16/2024     1:28 PM   PHQ-2/PHQ-9 Depression Screening   Little Interest or Pleasure in Doing Things 0-->not at all   Feeling Down, Depressed or Hopeless 0-->not at all   PHQ-9: Brief Depression Severity Measure Score 0     Health Habits and Functional and Cognitive Screenin/16/2024     1:00 PM   Functional & Cognitive Status   Do you have difficulty preparing food and eating? No   Do you have difficulty bathing yourself, getting dressed or " grooming yourself? No   Do you have difficulty using the toilet? No   Do you have difficulty moving around from place to place? Yes   Do you have trouble with steps or getting out of a bed or a chair? Yes   Current Diet Other   Dental Exam Up to date   Eye Exam Up to date   Exercise (times per week) 1 times per week   Current Exercises Include Other;Walking   Do you need help using the phone?  No   Are you deaf or do you have serious difficulty hearing?  No   Do you need help to go to places out of walking distance? Yes   Do you need help shopping? Yes   Do you need help preparing meals?  No   Do you need help with housework?  No   Do you need help with laundry? No   Do you need help taking your medications? No   Do you need help managing money? No   Do you ever drive or ride in a car without wearing a seat belt? No   Have you felt unusual stress, anger or loneliness in the last month? No   Who do you live with? Spouse   If you need help, do you have trouble finding someone available to you? No   Have you been bothered in the last four weeks by sexual problems? No   Do you have difficulty concentrating, remembering or making decisions? No             Age-appropriate Screening Schedule:  Refer to the list below for future screening recommendations based on patient's age, sex and/or medical conditions. Orders for these recommended tests are listed in the plan section. The patient has been provided with a written plan.    Health Maintenance List  Health Maintenance   Topic Date Due    TDAP/TD VACCINES (1 - Tdap) Never done    ZOSTER VACCINE (1 of 2) Never done    RSV Vaccine - Adults (1 - 1-dose 60+ series) Never done    DXA SCAN  10/16/2021    DIABETIC EYE EXAM  02/01/2023    COVID-19 Vaccine (6 - 2023-24 season) 02/19/2024    URINE MICROALBUMIN  07/26/2024    INFLUENZA VACCINE  08/01/2024    HEMOGLOBIN A1C  11/29/2024    LIPID PANEL  06/27/2025    ANNUAL WELLNESS VISIT  07/16/2025    Pneumococcal Vaccine 65+   Completed    Hepatitis B  Aged Out                                                                                                                                                CMS Preventative Services Quick Reference  Risk Factors Identified During Encounter  None Identified    The above risks/problems have been discussed with the patient.  Pertinent information has been shared with the patient in the After Visit Summary.  An After Visit Summary and PPPS were made available to the patient.    Follow Up:   Next Medicare Wellness visit to be scheduled in 1 year.     Assessment & Plan  Medicare annual wellness visit, subsequent    Gastroesophageal reflux disease without esophagitis    Vitamin D deficiency    S/P renal artery angioplasty    Coronary artery calcification seen on CT scan    Peripheral vascular disease of lower extremity    Essential hypertension  Hypertension is borderline  Medication changes per orders.  Blood pressure will be reassessedin 3 months.  Chronic heart failure with preserved ejection fraction    Renal artery stenosis    Hypothyroidism (acquired)    Oxygen dependent    Frailty syndrome in geriatric patient    COPD with lower respiratory infection  COPD is stable.    Plan:  Continue same medication/s without change.    Discussed medication dosage, use, side effects, and goals of treatment in detail.    Warning signs of respiratory distress were reviewed with the patient.   Discussed distinction between quick-relief and maintenance control medications..    Patient Treatment Goals:   symptom prevention, minimizing limitation in activity, prevention of exacerbations and use of ER/inpatient care, maintenance of optimal pulmonary function, and minimization of adverse effects of treatment    Followup  4 months .    Monoclonal paraproteinemia    End stage renal disease on dialysis  Renal condition is stable.  Continue current treatment regimen.  Renal condition will be reassessed in 6 months.  Type  2 diabetes mellitus with diabetic peripheral angiopathy without gangrene, without long-term current use of insulin  Diabetes is stable.   Continue current treatment regimen.  Discussed ways to avoid symptomatic hypoglycemia.  Diabetes will be reassessed in 6 months  Anemia of chronic renal failure, stage 4 (severe)    Orders Placed This Encounter   Procedures    Comprehensive Metabolic Panel    TSH+Free T4    Lipid Panel    Hemoglobin A1c    Ambulatory Referral to Pulmonology    CBC & Differential     New Medications Ordered This Visit   Medications    isosorbide mononitrate (IMDUR) 30 MG 24 hr tablet     Sig: Take 1 tablet by mouth Daily.     Dispense:  90 tablet     Refill:  3    losartan (Cozaar) 50 MG tablet     Sig: Take 1 tablet by mouth 2 (Two) Times a Day.     Dispense:  180 tablet     Refill:  3          Follow Up:   Return in about 4 months (around 11/16/2024).

## 2024-07-23 ENCOUNTER — HOSPITAL ENCOUNTER (OUTPATIENT)
Dept: CARDIOLOGY | Facility: HOSPITAL | Age: 89
Discharge: HOME | End: 2024-07-23
Admitting: NURSE PRACTITIONER
Payer: MEDICARE

## 2024-07-23 DIAGNOSIS — I70.1 RENAL ARTERY STENOSIS: ICD-10-CM

## 2024-07-23 LAB
BH CV ECHO MEAS - DIST REN A EDV LEFT: 6.9 CM/S
BH CV ECHO MEAS - DIST REN A PSV LEFT: 73.1 CM/S
BH CV ECHO MEAS - MID REN A EDV LEFT: 7.7 CM/S
BH CV ECHO MEAS - MID REN A PSV LEFT: 75.9 CM/S
BH CV ECHO MEAS - PROX REN A EDV LEFT: 7.3 CM/S
BH CV ECHO MEAS - PROX REN A PSV LEFT: 80.7 CM/S
BH CV VAS BP RIGHT ARM: NORMAL MMHG
BH CV VAS KIDNEY HEIGHT LEFT: 5.29 CM
BH CV VAS RENAL AORTIC MID EDV: 16.32 CM/S
BH CV VAS RENAL AORTIC MID PSV: 1.34 CM/S
BH CV VAS RENAL HILUM LEFT EDV: 7.78 CM/S
BH CV VAS RENAL HILUM LEFT PSV: 29.61 CM/S
BH CV VAS RENAL HILUM RIGHT EDV: 6 CM/S
BH CV VAS RENAL HILUM RIGHT PSV: 25.95 CM/S
BH CV XLRA MEAS - KID L LEFT: 10.71 CM
BH CV XLRA MEAS DIST REN A EDV RIGHT: 21.4 CM/S
BH CV XLRA MEAS DIST REN A PSV RIGHT: 117.1 CM/S
BH CV XLRA MEAS KID H RIGHT: 5.24 CM
BH CV XLRA MEAS KID L RIGHT: 10.6 CM
BH CV XLRA MEAS KID W RIGHT: 5.69 CM
BH CV XLRA MEAS MID REN A EDV RIGHT: 27 CM/S
BH CV XLRA MEAS MID REN A PSV RIGHT: 94.8 CM/S
BH CV XLRA MEAS PROX REN A EDV RIGHT: 21.4 CM/S
BH CV XLRA MEAS PROX REN A PSV RIGHT: 110.2 CM/S
BH CV XLRA MEAS RENAL A ORG EDV LEFT: 15.7 CM/S
BH CV XLRA MEAS RENAL A ORG EDV RIGHT: 9.4 CM/S
BH CV XLRA MEAS RENAL A ORG PSV LEFT: 116.1 CM/S
BH CV XLRA MEAS RENAL A ORG PSV RIGHT: 110.4 CM/S
LEFT KIDNEY WIDTH: 4.04 CM
LEFT RENAL UPPER PARENCHYMA MAX: 25.05 CM/S
LEFT RENAL UPPER PARENCHYMA MIN: 5.23 CM/S
LEFT RENAL UPPER PARENCHYMA RI: 0.79
RIGHT RENAL UPPER PARENCHYMA MAX: 18.9 CM/S
RIGHT RENAL UPPER PARENCHYMA MIN: 5.88 CM/S
RIGHT RENAL UPPER PARENCHYMA RI: 0.69

## 2024-07-23 PROCEDURE — 93975 VASCULAR STUDY: CPT | Performed by: SURGERY

## 2024-07-23 PROCEDURE — 93975 VASCULAR STUDY: CPT

## 2024-07-24 ENCOUNTER — TELEPHONE (OUTPATIENT)
Dept: INTERNAL MEDICINE | Age: 89
End: 2024-07-24
Payer: MEDICARE

## 2024-07-24 ENCOUNTER — TELEPHONE (OUTPATIENT)
Dept: CARDIOLOGY | Facility: CLINIC | Age: 89
End: 2024-07-24
Payer: MEDICARE

## 2024-07-24 RX ORDER — AZITHROMYCIN 250 MG/1
TABLET, FILM COATED ORAL
Qty: 6 TABLET | Refills: 0 | Status: SHIPPED | OUTPATIENT
Start: 2024-07-24

## 2024-07-24 NOTE — TELEPHONE ENCOUNTER
Caller: Sanjuana Cornell    Relationship: Emergency Contact    Best call back number: [phone]     What medication are you requesting: UNKNOWN, ANTIBIOTIC, STEROID    What are your current symptoms: COUGH, YELLOW PHLEGM, SNEEZING, HEAD CONGESTION    How long have you been experiencing symptoms: PAST 18 HOURS     Have you had these symptoms before:    [] Yes  [x] No    Have you been treated for these symptoms before:   [] Yes  [x] No    If a prescription is needed, what is your preferred pharmacy and phone number:    [address] [phone] [facility] [phone]      Additional notes:CALLER STATED THAT THE PATIENT CAN NOT MAKE APPOINTMENT TODAY OR FRIDAY DUE TO DIALYSIS TODAY AND FRIDAY AS WELL AS TRAVEL TO [place] TOMORROW FOR TESTING.   CALLER REQUESTING IF POSSIBLE TO HAVE THIS TAKEN CARE OF TODAY.

## 2024-07-25 ENCOUNTER — HOSPITAL ENCOUNTER (OUTPATIENT)
Dept: CARDIOLOGY | Facility: HOSPITAL | Age: 89
Discharge: HOME | End: 2024-07-25
Admitting: INTERNAL MEDICINE
Payer: MEDICARE

## 2024-07-25 ENCOUNTER — PATIENT ROUNDING (BHMG ONLY) (OUTPATIENT)
Dept: INTERNAL MEDICINE | Age: 89
End: 2024-07-25
Payer: MEDICARE

## 2024-07-25 VITALS — WEIGHT: 138.89 LBS | BODY MASS INDEX: 23.14 KG/M2 | HEIGHT: 65 IN

## 2024-07-25 DIAGNOSIS — I50.32 CHRONIC HEART FAILURE WITH PRESERVED EJECTION FRACTION: ICD-10-CM

## 2024-07-25 DIAGNOSIS — I35.0 AORTIC STENOSIS, MODERATE: ICD-10-CM

## 2024-07-25 PROCEDURE — 0 TECHNETIUM TC99M PYROPHOSPHATE: Performed by: INTERNAL MEDICINE

## 2024-07-25 PROCEDURE — 78803 RP LOCLZJ TUM SPECT 1 AREA: CPT

## 2024-07-25 PROCEDURE — A9538 TC99M PYROPHOSPHATE: HCPCS | Performed by: INTERNAL MEDICINE

## 2024-07-25 RX ADMIN — TECHNETIUM TC99M PYROPHOSPHATE 1 DOSE: 12 INJECTION INTRAVENOUS at 11:15

## 2024-07-29 ENCOUNTER — HOSPITAL ENCOUNTER (EMERGENCY)
Facility: HOSPITAL | Age: 89
Discharge: HOME | End: 2024-07-29
Attending: EMERGENCY MEDICINE
Payer: MEDICARE

## 2024-07-29 ENCOUNTER — APPOINTMENT (OUTPATIENT)
Dept: GENERAL RADIOLOGY | Facility: HOSPITAL | Age: 89
End: 2024-07-29
Payer: MEDICARE

## 2024-07-29 ENCOUNTER — TELEPHONE (OUTPATIENT)
Dept: INTERNAL MEDICINE | Age: 89
End: 2024-07-29
Payer: MEDICARE

## 2024-07-29 VITALS
SYSTOLIC BLOOD PRESSURE: 164 MMHG | RESPIRATION RATE: 15 BRPM | TEMPERATURE: 97.9 F | WEIGHT: 145.94 LBS | HEART RATE: 66 BPM | HEIGHT: 65 IN | BODY MASS INDEX: 24.32 KG/M2 | DIASTOLIC BLOOD PRESSURE: 50 MMHG | OXYGEN SATURATION: 100 %

## 2024-07-29 DIAGNOSIS — R42 DIZZINESS: Primary | ICD-10-CM

## 2024-07-29 LAB
ALBUMIN SERPL-MCNC: 3.5 G/DL (ref 3.5–5.2)
ALBUMIN/GLOB SERPL: 1.5 G/DL
ALP SERPL-CCNC: 168 U/L (ref 39–117)
ALT SERPL W P-5'-P-CCNC: 11 U/L (ref 1–33)
ANION GAP SERPL CALCULATED.3IONS-SCNC: 10.7 MMOL/L (ref 5–15)
AST SERPL-CCNC: 18 U/L (ref 1–32)
BASOPHILS # BLD AUTO: 0.02 10*3/MM3 (ref 0–0.2)
BASOPHILS NFR BLD AUTO: 0.4 % (ref 0–1.5)
BILIRUB SERPL-MCNC: 0.3 MG/DL (ref 0–1.2)
BUN SERPL-MCNC: 30 MG/DL (ref 8–23)
BUN/CREAT SERPL: 7.4 (ref 7–25)
CALCIUM SPEC-SCNC: 8.2 MG/DL (ref 8.6–10.5)
CHLORIDE SERPL-SCNC: 95 MMOL/L (ref 98–107)
CO2 SERPL-SCNC: 32.3 MMOL/L (ref 22–29)
CREAT SERPL-MCNC: 4.06 MG/DL (ref 0.57–1)
DEPRECATED RDW RBC AUTO: 51.9 FL (ref 37–54)
EGFRCR SERPLBLD CKD-EPI 2021: 10.2 ML/MIN/1.73
EOSINOPHIL # BLD AUTO: 0.11 10*3/MM3 (ref 0–0.4)
EOSINOPHIL NFR BLD AUTO: 2.3 % (ref 0.3–6.2)
ERYTHROCYTE [DISTWIDTH] IN BLOOD BY AUTOMATED COUNT: 14.1 % (ref 12.3–15.4)
GEN 5 2HR TROPONIN T REFLEX: 66 NG/L
GLOBULIN UR ELPH-MCNC: 2.3 GM/DL
GLUCOSE SERPL-MCNC: 105 MG/DL (ref 65–99)
HCT VFR BLD AUTO: 36 % (ref 34–46.6)
HGB BLD-MCNC: 10.8 G/DL (ref 12–15.9)
HOLD SPECIMEN: NORMAL
HOLD SPECIMEN: NORMAL
IMM GRANULOCYTES # BLD AUTO: 0.01 10*3/MM3 (ref 0–0.05)
IMM GRANULOCYTES NFR BLD AUTO: 0.2 % (ref 0–0.5)
LYMPHOCYTES # BLD AUTO: 0.65 10*3/MM3 (ref 0.7–3.1)
LYMPHOCYTES NFR BLD AUTO: 13.4 % (ref 19.6–45.3)
MAGNESIUM SERPL-MCNC: 2.4 MG/DL (ref 1.6–2.4)
MCH RBC QN AUTO: 30.3 PG (ref 26.6–33)
MCHC RBC AUTO-ENTMCNC: 30 G/DL (ref 31.5–35.7)
MCV RBC AUTO: 101.1 FL (ref 79–97)
MONOCYTES # BLD AUTO: 0.58 10*3/MM3 (ref 0.1–0.9)
MONOCYTES NFR BLD AUTO: 12 % (ref 5–12)
NEUTROPHILS NFR BLD AUTO: 3.47 10*3/MM3 (ref 1.7–7)
NEUTROPHILS NFR BLD AUTO: 71.7 % (ref 42.7–76)
NRBC BLD AUTO-RTO: 0 /100 WBC (ref 0–0.2)
PLATELET # BLD AUTO: 103 10*3/MM3 (ref 140–450)
PMV BLD AUTO: 10.1 FL (ref 6–12)
POTASSIUM SERPL-SCNC: 4.4 MMOL/L (ref 3.5–5.2)
PROT SERPL-MCNC: 5.8 G/DL (ref 6–8.5)
QT INTERVAL: 468 MS
QTC INTERVAL: 493 MS
RBC # BLD AUTO: 3.56 10*6/MM3 (ref 3.77–5.28)
SODIUM SERPL-SCNC: 138 MMOL/L (ref 136–145)
TROPONIN T DELTA: -3 NG/L
TROPONIN T SERPL HS-MCNC: 69 NG/L
WBC NRBC COR # BLD AUTO: 4.84 10*3/MM3 (ref 3.4–10.8)
WHOLE BLOOD HOLD COAG: NORMAL
WHOLE BLOOD HOLD SPECIMEN: NORMAL

## 2024-07-29 PROCEDURE — 80053 COMPREHEN METABOLIC PANEL: CPT | Performed by: EMERGENCY MEDICINE

## 2024-07-29 PROCEDURE — 36415 COLL VENOUS BLD VENIPUNCTURE: CPT

## 2024-07-29 PROCEDURE — 85025 COMPLETE CBC W/AUTO DIFF WBC: CPT | Performed by: EMERGENCY MEDICINE

## 2024-07-29 PROCEDURE — 93005 ELECTROCARDIOGRAM TRACING: CPT | Performed by: EMERGENCY MEDICINE

## 2024-07-29 PROCEDURE — 83735 ASSAY OF MAGNESIUM: CPT | Performed by: EMERGENCY MEDICINE

## 2024-07-29 PROCEDURE — 99284 EMERGENCY DEPT VISIT MOD MDM: CPT

## 2024-07-29 PROCEDURE — 71045 X-RAY EXAM CHEST 1 VIEW: CPT

## 2024-07-29 PROCEDURE — 84484 ASSAY OF TROPONIN QUANT: CPT | Performed by: EMERGENCY MEDICINE

## 2024-07-29 RX ORDER — SODIUM CHLORIDE 0.9 % (FLUSH) 0.9 %
10 SYRINGE (ML) INJECTION AS NEEDED
Status: DISCONTINUED | OUTPATIENT
Start: 2024-07-29 | End: 2024-07-29 | Stop reason: HOSPADM

## 2024-07-29 NOTE — TELEPHONE ENCOUNTER
Called daughter, Sanjuana, back. [place], [facility] informing her that the original appt scheduled had been taken before I could hit submit.     Told her to call back if this doesn't work or if she needed to cancel depending on ER outcome today.    APPOINTMENT CHANGED TO THURSDAY 10AM W/ DR HARDY.    ^^HUB TO RELAY PLEASE & THANK YOU^^

## 2024-07-29 NOTE — ED PROVIDER NOTES
Time: 3:24 PM EDT  Date of encounter:  7/29/2024  Independent Historian/Clinical History and Information was obtained by:   Patient    History is limited by: N/A    Chief Complaint: Dizziness      History of Present Illness:  Patient is a 89 y.o. year old female who presents to the emergency department for evaluation of dizziness.  Patient reports that she started feeling dizzy after a recent medication change.  Patient denies chest pain and shortness of breath.  Patient has no cough or hemoptysis.  Patient denies nausea and vomiting.  Patient denies new subjective neurological deficit.  Patient states that she does not currently make urine.    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 DENIES CP BUT DOES HAVE SOA CHRONIC ISSUE COPD/LUNG CANCER    COPD (chronic obstructive pulmonary disease)     FOLLOWED BY DR MARIAJOSE BAILEY    Coronary artery disease     DENIES CP BUT DOES GET SOA MOST OF THE TIME WITH EXERTION BUT OCC AT REST CHRONIC ISSUE COPD/LUNG CANCER    Deep vein thrombosis     Diabetes mellitus     DOES NOT CHECK BS DAILY    Disease of thyroid gland     HYPOTHYROIDISM    Essential hypertension     Gastric ulcer     GERD (gastroesophageal reflux disease)     Heart murmur     History of transfusion     NO ISSUES POST TRANSFUSION  WAS MANY YEARS AGO    Hyperlipemia     Leukocytopenia     FOLLOWED BY DR MIR BAILEY    Limb swelling     Lumbago     Lumbar spinal stenosis     Lung cancer     Migraine headache     Multiple joint pain     On home oxygen therapy     3L/NC PRN    Osteopenia     PNA (pneumonia) 05/04/2024    Pseudomonas pneumonia 04/29/2024    Reflux esophagitis     Shortness of breath     Thyroid nodule     HAS ULTRASOUND YEARLY BEING MONITORED    Vascular disease     Vitamin D deficiency      Past Surgical History:   Procedure Laterality Date    ABDOMINAL SURGERY      ANGIOPLASTY RENAL ARTERY Left 06/14/2024    stent placement    APPENDECTOMY      ARTERIOGRAM N/A 6/14/2024    Procedure: Arteriogram, right radial access, aortogram and renal arteriogram with possible intervention.;  Surgeon: Dio Miguel MD;  Location: [facility] CATH INVASIVE LOCATION;  Service: Peripheral Vascular;  Laterality: N/A;    ARTERIOVENOUS FISTULA/SHUNT SURGERY Left 05/10/2022    Procedure: Left basilic vein transposition;  Surgeon: Wan Barksdale MD;  Location: [facility] MAIN OR;  Service: Vascular;  Laterality: Left;    ARTERIOVENOUS FISTULA/SHUNT SURGERY Left 03/23/2023    Procedure: Ligation of left arm arteriovenous fistula;  Surgeon: Wan Barksdale MD;  Location: [facility] MAIN OR;  Service: Vascular;  Laterality: Left;    ARTERIOVENOUS FISTULA/SHUNT SURGERY Left 11/30/2023    Procedure: Creation of left arm arteriovenous graft;  Surgeon: Wan Barksdale MD;  Location: [facility] MAIN OR;  Service: Vascular;  Laterality: Left;    BRONCHOSCOPY N/A 04/24/2024    Procedure: BRONCHOSCOPY WITH BAL AND WASHINGS;  Surgeon: Khalif Yanez MD;  Location: [facility] ENDOSCOPY;  Service: Pulmonary;  Laterality: N/A;  MUCUS PLUGGING    CARDIAC CATHETERIZATION  1996    CARDIAC SURGERY      CARDIAC SURGERY      fluid drained from heart    CATARACT EXTRACTION, BILATERAL  2003    COLONOSCOPY  2014    ENDOSCOPY  2016    2019    FEMORAL ARTERY STENT Bilateral     HYSTERECTOMY       LUNG BIOPSY Left 2005    lobectomy upper lung caner    LUNG VOLUME REDUCTION      OTHER SURGICAL HISTORY      artifical joints/limbs    REPLACEMENT TOTAL KNEE Left 2016    UPPER GASTROINTESTINAL ENDOSCOPY       Family History   Problem Relation Age of Onset    Arthritis Mother     Arthritis Father     Cancer Brother     Diabetes Brother     Other Brother         blood disease     Prostate cancer Brother     Cancer Brother     Prostate cancer Brother     Cancer Brother     Cancer Brother     Malig Hyperthermia Neg Hx     Colon cancer Neg Hx        Home Medications:  Prior to Admission medications    Medication Sig Start Date End Date Taking? Authorizing Provider   acetaminophen (TYLENOL) 325 MG tablet Take 2 tablets by mouth Every 6 (Six) Hours As Needed for Mild Pain.    Sarah Parker MD   albuterol sulfate  (90 Base) MCG/ACT inhaler Inhale 2 puffs Every 4 (Four) Hours As Needed for Wheezing.    Sarah Parker MD   aspirin 81 MG EC tablet Take 1 tablet by mouth Daily.    Sarah Parker MD   azithromycin (Zithromax Z-Jonathan) 250 MG tablet Take 2 tablets by mouth on day 1, then 1 tablet daily on days 2-5 7/24/24   Brennan Mosqueda MD   budesonide (Pulmicort) 0.5 MG/2ML nebulizer solution Take 2 mL by nebulization Daily. 6/19/24   Brennan Mosqueda MD   carvedilol (COREG) 12.5 MG tablet Take 1 tablet by mouth 2 (Two) Times a Day With Meals for 30 days. 5/7/24 7/16/24  Rafael Lyons MD   clopidogrel (Plavix) 75 MG tablet Take 1 tablet by mouth Daily. 6/15/24 6/15/25  Dio Miguel MD   dexlansoprazole (DEXILANT) 60 MG capsule Take 1 capsule by mouth Daily. 9/23/22   Sarah Parker MD   furosemide (LASIX) 20 MG tablet Take 1 tablet by mouth Daily. 6/18/24   Halima Quick APRN   HYDROcodone-acetaminophen (Norco) 5-325 MG per tablet Take 1 tablet by mouth Every 8 (Eight) Hours As Needed for Moderate Pain.  Patient taking differently: Take 1 tablet by mouth Every 8 (Eight)  Hours As Needed for Moderate Pain. PRN 6/19/24   Brennan Mosqueda MD   ipratropium-albuterol (DUO-NEB) 0.5-2.5 mg/3 ml nebulizer Take 3 mL by nebulization Every 4 (Four) Hours As Needed for Shortness of Air or Wheezing for up to 30 days. 4/29/24 7/16/24  Rafael Lyons MD   isosorbide dinitrate (ISORDIL) 20 MG tablet Take 1 tablet by mouth 3 (Three) Times a Day for 30 days. 4/29/24 7/2/24  Rafael Lyons MD   isosorbide mononitrate (IMDUR) 30 MG 24 hr tablet Take 1 tablet by mouth Daily. 7/16/24   Brennan Mosqueda MD   levocetirizine (XYZAL) 5 MG tablet TAKE 1 TABLET DAILY  Patient taking differently: Take 1 tablet by mouth Every Evening. 6/23/23   Paloma James MD   levothyroxine (Synthroid) 50 MCG tablet Take 0.5 tablets by mouth Daily. 7/2/24   Maria Teresa Malhotra MD   linagliptin (Tradjenta) 5 MG tablet tablet Take 1 tablet by mouth Daily. 1/10/22   Paloma James MD   losartan (COZAAR) 100 MG tablet Take 1 tablet by mouth Daily for 30 days. 6/4/24 7/16/24  Pieter Salgado MD   losartan (Cozaar) 50 MG tablet Take 1 tablet by mouth 2 (Two) Times a Day. 7/16/24   Brennan Mosqueda MD   Methylcellulose, Laxative, (FIBER THERAPY PO) Take 2 tablets by mouth Daily.    ProviderSarah MD   nitroglycerin (NITROSTAT) 0.4 MG SL tablet Place 1 tablet under the tongue Every 5 (Five) Minutes As Needed for Chest Pain.    Sarah Parker MD   rosuvastatin (CRESTOR) 20 MG tablet Take 1 tablet by mouth Daily. 7/7/23   León Sanchez MD saccharomyces boulardii (FLORASTOR) 250 MG capsule Take 1 capsule by mouth 2 (Two) Times a Day.  Patient not taking: Reported on 7/2/2024    ProviderSarah MD   sevelamer (RENVELA) 800 MG tablet Take 1 tablet by mouth 3 (Three) Times a Day With Meals. 10/9/23   Provider, MD Sarah   spironolactone (ALDACTONE) 25 MG tablet Take 1 tablet by mouth Every Other Day. 7/2/24   Maria Teresa Malhotra MD   tobramycin (JUAN) Take 5 mL by nebulization 2  "(Two) Times a Day.    ProviderSarah MD   vitamin D (ERGOCALCIFEROL) 1.25 MG (12203 UT) capsule capsule Take 1 capsule by mouth Every 14 (Fourteen) Days.    ProviderSarah MD        Social History:   Social History     Tobacco Use    Smoking status: Former     Current packs/day: 0.00     Average packs/day: 1 pack/day for 52.5 years (52.5 ttl pk-yrs)     Types: Cigarettes     Start date:      Quit date: 2004     Years since quittin.1     Passive exposure: Past    Smokeless tobacco: Never   Vaping Use    Vaping status: Never Used   Substance Use Topics    Alcohol use: Never    Drug use: Never         Review of Systems:  Review of Systems   Constitutional:  Negative for chills and fever.   HENT:  Negative for congestion, rhinorrhea and sore throat.    Eyes:  Negative for pain and visual disturbance.   Respiratory:  Negative for apnea, cough, chest tightness and shortness of breath.    Cardiovascular:  Negative for chest pain and palpitations.   Gastrointestinal:  Negative for abdominal pain, diarrhea, nausea and vomiting.   Genitourinary:  Negative for difficulty urinating and dysuria.   Musculoskeletal:  Negative for joint swelling and myalgias.   Skin:  Negative for color change.   Neurological:  Positive for dizziness. Negative for seizures and headaches.   Psychiatric/Behavioral: Negative.     All other systems reviewed and are negative.       Physical Exam:  /50   Pulse 66   Temp 97.9 °F (36.6 °C) (Oral)   Resp 15   Ht 165.1 cm (65\")   Wt 66.2 kg (145 lb 15.1 oz)   LMP  (LMP Unknown)   SpO2 100%   BMI 24.29 kg/m²     Physical Exam  Vitals and nursing note reviewed.   Constitutional:       General: She is not in acute distress.     Appearance: Normal appearance. She is not toxic-appearing.   HENT:      Head: Normocephalic and atraumatic.      Jaw: There is normal jaw occlusion.   Eyes:      General: Lids are normal.      Extraocular Movements: Extraocular movements intact. "      Conjunctiva/sclera: Conjunctivae normal.      Pupils: Pupils are equal, round, and reactive to light.   Cardiovascular:      Rate and Rhythm: Normal rate and regular rhythm.      Pulses: Normal pulses.      Heart sounds: Normal heart sounds.   Pulmonary:      Effort: Pulmonary effort is normal. No respiratory distress.      Breath sounds: Normal breath sounds. No wheezing or rhonchi.   Abdominal:      General: Abdomen is flat.      Palpations: Abdomen is soft.      Tenderness: There is no abdominal tenderness. There is no guarding or rebound.   Musculoskeletal:         General: Normal range of motion.      Cervical back: Normal range of motion and neck supple.      Right lower leg: No edema.      Left lower leg: No edema.   Skin:     General: Skin is warm and dry.   Neurological:      Mental Status: She is alert and oriented to person, place, and time. Mental status is at baseline.   Psychiatric:         Mood and Affect: Mood normal.                  Procedures:  Procedures      Medical Decision Making:      Comorbidities that affect care:    Chronic Kidney Disease    External Notes reviewed:    None      The following orders were placed and all results were independently analyzed by me:  Orders Placed This Encounter   Procedures    XR Chest 1 View    [place] Draw    Comprehensive Metabolic Panel    Single High Sensitivity Troponin T    Magnesium    Urinalysis With Microscopic If Indicated (No Culture) - Urine, Clean Catch    CBC Auto Differential    High Sensitivity Troponin T 2Hr    NPO Diet NPO Type: Strict NPO    Undress & Gown    Continuous Pulse Oximetry    Vital Signs    Orthostatic Blood Pressure    Oxygen Therapy- Nasal Cannula; Titrate 1-6 LPM Per SpO2; 90 - 95%    POC Glucose Once    ECG 12 Lead ED Triage Standing Order; Weak / Dizzy / AMS    Insert Peripheral IV    Fall Precautions    CBC & Differential    Green Top (Gel)    Lavender Top    Gold Top - SST    Light Blue Top       Medications Given in  the Emergency Department:  Medications   sodium chloride 0.9 % flush 10 mL (has no administration in time range)        ED Course:         Labs:    Lab Results (last 24 hours)       Procedure Component Value Units Date/Time    CBC & Differential [[id number]]  (Abnormal) Collected: 07/29/24 1050    Specimen: Blood Updated: 07/29/24 1104    Narrative:      The following orders were created for panel order CBC & Differential.  Procedure                               Abnormality         Status                     ---------                               -----------         ------                     CBC Auto Differential[[id number]]        Abnormal            Final result                 Please view results for these tests on the individual orders.    Comprehensive Metabolic Panel [[id number]]  (Abnormal) Collected: 07/29/24 1050    Specimen: Blood Updated: 07/29/24 1120     Glucose 105 mg/dL      BUN 30 mg/dL      Creatinine 4.06 mg/dL      Sodium 138 mmol/L      Potassium 4.4 mmol/L      Chloride 95 mmol/L      CO2 32.3 mmol/L      Calcium 8.2 mg/dL      Total Protein 5.8 g/dL      Albumin 3.5 g/dL      ALT (SGPT) 11 U/L      AST (SGOT) 18 U/L      Alkaline Phosphatase 168 U/L      Total Bilirubin 0.3 mg/dL      Globulin 2.3 gm/dL      A/G Ratio 1.5 g/dL      BUN/Creatinine Ratio 7.4     Anion Gap 10.7 mmol/L      eGFR 10.2 mL/min/1.73      Comment: <15 Indicative of kidney failure       Narrative:      GFR Normal >60  Chronic Kidney Disease <60  Kidney Failure <15    The GFR formula is only valid for adults with stable renal function between ages 18 and 70.    Single High Sensitivity Troponin T [[id number]]  (Abnormal) Collected: 07/29/24 1050    Specimen: Blood Updated: 07/29/24 1134     HS Troponin T 69 ng/L     Narrative:      High Sensitive Troponin T Reference Range:  <14.0 ng/L- Negative Female for AMI  <22.0 ng/L- Negative Male for AMI  >=14 - Abnormal Female indicating possible myocardial injury.  >=22 - Abnormal  Male indicating possible myocardial injury.   Clinicians would have to utilize clinical acumen, EKG, Troponin, and serial changes to determine if it is an Acute Myocardial Infarction or myocardial injury due to an underlying chronic condition.         Magnesium [[id number]]  (Normal) Collected: 07/29/24 1050    Specimen: Blood Updated: 07/29/24 1120     Magnesium 2.4 mg/dL     CBC Auto Differential [[id number]]  (Abnormal) Collected: 07/29/24 1050    Specimen: Blood Updated: 07/29/24 1104     WBC 4.84 10*3/mm3      RBC 3.56 10*6/mm3      Hemoglobin 10.8 g/dL      Hematocrit 36.0 %      .1 fL      MCH 30.3 pg      MCHC 30.0 g/dL      RDW 14.1 %      RDW-SD 51.9 fl      MPV 10.1 fL      Platelets 103 10*3/mm3      Neutrophil % 71.7 %      Lymphocyte % 13.4 %      Monocyte % 12.0 %      Eosinophil % 2.3 %      Basophil % 0.4 %      Immature Grans % 0.2 %      Neutrophils, Absolute 3.47 10*3/mm3      Lymphocytes, Absolute 0.65 10*3/mm3      Monocytes, Absolute 0.58 10*3/mm3      Eosinophils, Absolute 0.11 10*3/mm3      Basophils, Absolute 0.02 10*3/mm3      Immature Grans, Absolute 0.01 10*3/mm3      nRBC 0.0 /100 WBC     High Sensitivity Troponin T 2Hr [[id number]]  (Abnormal) Collected: 07/29/24 1304    Specimen: Blood Updated: 07/29/24 1342     HS Troponin T 66 ng/L      Troponin T Delta -3 ng/L     Narrative:      High Sensitive Troponin T Reference Range:  <14.0 ng/L- Negative Female for AMI  <22.0 ng/L- Negative Male for AMI  >=14 - Abnormal Female indicating possible myocardial injury.  >=22 - Abnormal Male indicating possible myocardial injury.   Clinicians would have to utilize clinical acumen, EKG, Troponin, and serial changes to determine if it is an Acute Myocardial Infarction or myocardial injury due to an underlying chronic condition.                  Imaging:    XR Chest 1 View    Result Date: 7/29/2024  XR CHEST 1 VW Date of Exam: 7/29/2024 10:59 AM EDT Indication: Weak/Dizzy/AMS triage protocol  Comparison: 7/15/2024 Findings: Cardiac size is stable. There is diffuse pleural thickening in the left hemithorax with pleural calcifications and airspace disease. There is an ill-defined density in the right upper lobe which appears slightly smaller on the current exam. A right-sided  dialysis catheter is in place with the tip in the SVC     Impression: 1. Chronic appearing scarring and pleural thickening in the left hemithorax, stable. 2. Ill-defined right perihilar density in the right lung which appears unchanged. No acute infiltrates are identified. 3. Right-sided dialysis catheter appears in appropriate position Electronically Signed: León Frederick MD  7/29/2024 11:03 AM EDT  Workstation ID: [id number]       Differential Diagnosis and Discussion:    Dizziness: Based on the patient's history, signs, and symptoms, the diffential diagnosis includes but is not limited to meningitis, stroke, sepsis, subarachnoid hemorrhage, intracranial bleeding, encephalitis, vertigo, electrolyte imbalance, and metabolic disorders.    All labs were reviewed and interpreted by me.  All X-rays impressions were independently interpreted by me.  EKG was interpreted by me.    MDM     The patient is resting comfortably and feels better, is alert, talkative and in no distress.  The patient´s CBC that was reviewed and interpreted by me shows no abnormalities of critical concern. Of note, there is no anemia requiring a blood transfusion and the platelet count is acceptable.  The patient´s CMP that was reviewed and interpretted by me shows no abnormalities of critical concern. Of note, the patient´s sodium and potassium are acceptable. The patient´s liver enzymes are unremarkable. The patient´s renal function (creatinine) is unchanged. The patient has a normal anion gap.  The repeat examination is unremarkable and benign. The patient is neurologically intact, has a normal mental status and this ambulatory in the ED. The history, exam,  diagnostic testing in the patient's current condition do not suggest meningitis, stroke, sepsis, subarachnoid hemorrhage, intracranial bleeding, encephalitis or other significant pathology that would warrant further testing, continued ED treatment, admission, neurological consultation, or other specialist evaluation at this point. The vital signs have been stable.  The patient recently started levothyroxine and isosorbide at new dosage.  The patient's condition is stable and appropriate for discharge. The patient will pursue further outpatient evaluation with the primary care physician or other designated or consulting position as indicated in the discharge instructions.            Patient Care Considerations:    PERC: I used the PERC score to risk stratify the patient for PE and a CT of the chest was considered but ultimately not indicated in today's visit.      Consultants/Shared Management Plan:    None    Social Determinants of Health:    Patient is independent, reliable, and has access to care.       Disposition and Care Coordination:    Discharged: I considered escalation of care by admitting this patient to the hospital, however blood pressure is improved and the patient will dial back one of her new medications and follow-up with Dr. Mosqueda.    I have explained the patient´s condition, diagnoses and treatment plan based on the information available to me at this time. I have answered questions and addressed any concerns. The patient has a good  understanding of the patient´s diagnosis, condition, and treatment plan as can be expected at this point. The vital signs have been stable. The patient´s condition is stable and appropriate for discharge from the emergency department.      The patient will pursue further outpatient evaluation with the primary care physician or other designated or consulting physician as outlined in the discharge instructions. They are agreeable to this plan of care and follow-up  instructions have been explained in detail. The patient has received these instructions in written format and has expressed an understanding of the discharge instructions. The patient is aware that any significant change in condition or worsening of symptoms should prompt an immediate return to this or the closest emergency department or call to 911.  I have explained discharge medications and the need for follow up with the patient/caretakers. This was also printed in the discharge instructions. Patient was discharged with the following medications and follow up:      Medication List        Changed      HYDROcodone-acetaminophen 5-325 MG per tablet  Commonly known as: Norco  Take 1 tablet by mouth Every 8 (Eight) Hours As Needed for Moderate Pain.  What changed: additional instructions     levocetirizine 5 MG tablet  Commonly known as: XYZAL  TAKE 1 TABLET DAILY  What changed: when to take this           Brennan Mosqueda MD  [address] [place] [id number]  [phone]             Final diagnoses:   Dizziness        ED Disposition       ED Disposition   Discharge    Condition   Stable    Comment   --               This medical record created using voice recognition software.             Ken Cuenca MD  07/29/24 [id number]

## 2024-07-29 NOTE — TELEPHONE ENCOUNTER
Daughter, Sanjuana, called requesting appt for pt due to ongoing dizziness that started last wk.  Dr. Mosqueda is out this week.   We scheduled appt for Wed at 330pm w/ Dr. Sol & recommended pt seek medical attention until then.   Daughter voiced understanding.

## 2024-07-31 ENCOUNTER — OFFICE VISIT (OUTPATIENT)
Dept: VASCULAR SURGERY | Facility: HOSPITAL | Age: 89
End: 2024-07-31
Payer: MEDICARE

## 2024-07-31 VITALS
HEART RATE: 74 BPM | OXYGEN SATURATION: 96 % | SYSTOLIC BLOOD PRESSURE: 162 MMHG | TEMPERATURE: 97.6 F | DIASTOLIC BLOOD PRESSURE: 70 MMHG | RESPIRATION RATE: 18 BRPM

## 2024-07-31 DIAGNOSIS — N18.6 ESRD ON DIALYSIS: Primary | ICD-10-CM

## 2024-07-31 DIAGNOSIS — I77.1 STRICTURE OF ARTERY: ICD-10-CM

## 2024-07-31 DIAGNOSIS — Z99.2 ESRD ON DIALYSIS: Primary | ICD-10-CM

## 2024-07-31 PROCEDURE — G0463 HOSPITAL OUTPT CLINIC VISIT: HCPCS | Performed by: SURGERY

## 2024-07-31 NOTE — PROGRESS NOTES
[facility]   Follow up Office    Patient Name: Charlette Rai  : 1935  MRN: [id number]  Primary Care Physician:  Brennan Mosqueda MD      Subjective   Subjective     HPI:    Charlette Rai is a 89 y.o. female here for follow-up after renal angiography with left renal artery stenting 2024.  Doing well.  No complaints.      Objective     Vitals:   Temp:  [97.6 °F (36.4 °C)] 97.6 °F (36.4 °C)  Heart Rate:  [74] 74  Resp:  [18] 18  BP: (162)/(70) 162/70    Physical Exam      General: Alert, no acute distress.  HEENT: PERRLA  Abdomen: Benign  Extremities: Symmetric.  Neuro: No gross deficits    Diagnostic studies: A renal duplex dated 2024 demonstrates patent bilateral renal arteries.  The left renal artery stent could not be characterized.    Assessment & Plan   Assessment / Plan     Diagnoses and all orders for this visit:    1. ESRD on dialysis (Primary)    2. Stricture of artery       Assessment/Plan:   Satisfactory progress following left renal artery stenting.  Follow-up as needed.        Electronically signed by Wan Barksdale MD, 24, 11:10 AM EDT.

## 2024-08-01 ENCOUNTER — OFFICE VISIT (OUTPATIENT)
Dept: INTERNAL MEDICINE | Age: 89
End: 2024-08-01
Payer: MEDICARE

## 2024-08-01 VITALS
HEIGHT: 65 IN | SYSTOLIC BLOOD PRESSURE: 174 MMHG | DIASTOLIC BLOOD PRESSURE: 61 MMHG | OXYGEN SATURATION: 87 % | HEART RATE: 70 BPM | WEIGHT: 145 LBS | BODY MASS INDEX: 24.16 KG/M2

## 2024-08-01 DIAGNOSIS — I73.9 PERIPHERAL VASCULAR DISEASE OF LOWER EXTREMITY: ICD-10-CM

## 2024-08-01 DIAGNOSIS — I10 ESSENTIAL HYPERTENSION: ICD-10-CM

## 2024-08-01 DIAGNOSIS — E11.51 TYPE 2 DIABETES MELLITUS WITH DIABETIC PERIPHERAL ANGIOPATHY WITHOUT GANGRENE, WITHOUT LONG-TERM CURRENT USE OF INSULIN: ICD-10-CM

## 2024-08-01 DIAGNOSIS — I50.32 CHRONIC HEART FAILURE WITH PRESERVED EJECTION FRACTION: ICD-10-CM

## 2024-08-01 DIAGNOSIS — R42 DIZZINESS: Primary | ICD-10-CM

## 2024-08-01 DIAGNOSIS — N18.6 END STAGE RENAL DISEASE ON DIALYSIS: ICD-10-CM

## 2024-08-01 DIAGNOSIS — H65.01 RIGHT ACUTE SEROUS OTITIS MEDIA, RECURRENCE NOT SPECIFIED: ICD-10-CM

## 2024-08-01 DIAGNOSIS — Z98.62 S/P RENAL ARTERY ANGIOPLASTY: ICD-10-CM

## 2024-08-01 DIAGNOSIS — Z99.2 END STAGE RENAL DISEASE ON DIALYSIS: ICD-10-CM

## 2024-08-01 DIAGNOSIS — Z99.81 OXYGEN DEPENDENT: ICD-10-CM

## 2024-08-01 DIAGNOSIS — E03.9 HYPOTHYROIDISM (ACQUIRED): ICD-10-CM

## 2024-08-01 DIAGNOSIS — C34.11 MALIGNANT NEOPLASM OF UPPER LOBE OF RIGHT LUNG: ICD-10-CM

## 2024-08-01 PROCEDURE — 1125F AMNT PAIN NOTED PAIN PRSNT: CPT | Performed by: INTERNAL MEDICINE

## 2024-08-01 PROCEDURE — 99214 OFFICE O/P EST MOD 30 MIN: CPT | Performed by: INTERNAL MEDICINE

## 2024-08-01 RX ORDER — MECLIZINE HCL 12.5 MG/1
TABLET ORAL
Qty: 10 TABLET | Refills: 0 | Status: SHIPPED | OUTPATIENT
Start: 2024-08-01

## 2024-08-01 NOTE — PROGRESS NOTES
"CHIEF COMPLAINT  Charlette Rai presents to [facility] for follow-up of Dizziness (Ongoing for about 3 days now went to the ER on Monday ), Cough (Ongoing ), and Ear Fullness (Pt states her ears are stopped up as well ).    HPI  88 yo pt of Dr Edmundo Mosqueda  Here with daughter and     Records reviewed, meds reviewed in detail, labs reviewed with patient.  Plan of care is as follows below.    Main concern is dizziness -room spinning and cough-yellow sputum  /Left ear fullness-see above  S/p zpak called in -finished it 3 days ago    End-stage renal disease-goes to dialyses MWF-    Hypertension-taking meds but Imdur and losartan usually taken in the morning before dialysis    COPD - on 3 L at home sats >90 % at home    Older notes reviewed-see below  Seen at ER 7/29/24 for dizziness-given norco and xyzal 5 mg  ESRD -on dialysis  Renal artery stricture-saw Dr Barksdale yesterday-s/p renal angiography with left renal artery stenting 6/14/2024.     Saw Dr Mosqueda 7/16/24  \"Patient was recently in the hospital May 30 through June 4, 2024, she is a patient of Dr. 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, she had hypertension issues and she is on dialysis, they also described her as having a type II non-ST elevation myocardial infarction and according to her hospital course,----------- she had previous lung cancer with resection and chemo and radiation on the right and the resection and chemo for the left lung cancer, she has been to rehab several times over the past couple months, she went home at 1 point came right back to the hospital within several days due to shortness of breath, she recently had renal artery stenting on the left side as an outpatient with vascular surgery here locally \"        Current Outpatient Medications:     " acetaminophen (TYLENOL) 325 MG tablet, Take 2 tablets by mouth Every 6 (Six) Hours As Needed for Mild Pain., Disp: , Rfl:     albuterol sulfate  (90 Base) MCG/ACT inhaler, Inhale 2 puffs Every 4 (Four) Hours As Needed for Wheezing., Disp: , Rfl:     aspirin 81 MG EC tablet, Take 1 tablet by mouth Daily., Disp: , Rfl:     budesonide (Pulmicort) 0.5 MG/2ML nebulizer solution, Take 2 mL by nebulization Daily., Disp: 60 each, Rfl: 5    clopidogrel (Plavix) 75 MG tablet, Take 1 tablet by mouth Daily., Disp: 90 tablet, Rfl: 3    dexlansoprazole (DEXILANT) 60 MG capsule, Take 1 capsule by mouth Daily., Disp: , Rfl:     furosemide (LASIX) 20 MG tablet, Take 1 tablet by mouth Daily., Disp: 90 tablet, Rfl: 3    HYDROcodone-acetaminophen (Norco) 5-325 MG per tablet, Take 1 tablet by mouth Every 8 (Eight) Hours As Needed for Moderate Pain. (Patient taking differently: Take 1 tablet by mouth Every 8 (Eight) Hours As Needed for Moderate Pain. PRN), Disp: 30 tablet, Rfl: 0    isosorbide mononitrate (IMDUR) 30 MG 24 hr tablet, Take 1 tablet by mouth Daily., Disp: 90 tablet, Rfl: 3    levocetirizine (XYZAL) 5 MG tablet, TAKE 1 TABLET DAILY (Patient taking differently: Take 1 tablet by mouth Every Evening.), Disp: 90 tablet, Rfl: 1    levothyroxine (Synthroid) 50 MCG tablet, Take 0.5 tablets by mouth Daily., Disp: 45 tablet, Rfl: 3    linagliptin (Tradjenta) 5 MG tablet tablet, Take 1 tablet by mouth Daily., Disp: 90 tablet, Rfl: 1    losartan (Cozaar) 50 MG tablet, Take 1 tablet by mouth 2 (Two) Times a Day., Disp: 180 tablet, Rfl: 3    Methoxy PEG-Epoetin Beta (MIRCERA IJ), 60 mcg Every 14 (Fourteen) Days., Disp: , Rfl:     Methylcellulose, Laxative, (FIBER THERAPY PO), Take 2 tablets by mouth Daily., Disp: , Rfl:     nitroglycerin (NITROSTAT) 0.4 MG SL tablet, Place 1 tablet under the tongue Every 5 (Five) Minutes As Needed for Chest Pain., Disp: , Rfl:     rosuvastatin (CRESTOR) 20 MG tablet, Take 1 tablet by mouth Daily.,  "Disp: 90 tablet, Rfl: 3    saccharomyces boulardii (FLORASTOR) 250 MG capsule, Take 1 capsule by mouth 2 (Two) Times a Day., Disp: , Rfl:     sevelamer (RENVELA) 800 MG tablet, Take 1 tablet by mouth 3 (Three) Times a Day With Meals., Disp: , Rfl:     spironolactone (ALDACTONE) 25 MG tablet, Take 1 tablet by mouth Every Other Day., Disp: 45 tablet, Rfl: 3    tobramycin (JUAN), Take 5 mL by nebulization 2 (Two) Times a Day., Disp: , Rfl:     vitamin D (ERGOCALCIFEROL) 1.25 MG (06524 UT) capsule capsule, Take 1 capsule by mouth Every 14 (Fourteen) Days., Disp: , Rfl:     carvedilol (COREG) 12.5 MG tablet, Take 1 tablet by mouth 2 (Two) Times a Day With Meals for 30 days., Disp: 60 tablet, Rfl: 0    ipratropium-albuterol (DUO-NEB) 0.5-2.5 mg/3 ml nebulizer, Take 3 mL by nebulization Every 4 (Four) Hours As Needed for Shortness of Air or Wheezing for up to 30 days., Disp: 360 mL, Rfl: 2    isosorbide dinitrate (ISORDIL) 20 MG tablet, Take 1 tablet by mouth 3 (Three) Times a Day for 30 days., Disp: 90 tablet, Rfl: 0    losartan (COZAAR) 100 MG tablet, Take 1 tablet by mouth Daily for 30 days., Disp: 30 tablet, Rfl: 0    meclizine (ANTIVERT) 12.5 MG tablet, One tab po q d prn dizziness, Disp: 10 tablet, Rfl: 0   PFSH reviewed.      OBJECTIVE  Vital Signs  Vitals:    08/01/24 0949   BP: 174/61   BP Location: Left arm   Patient Position: Sitting   Cuff Size: Adult   Pulse: 70   SpO2: (!) 87%   Weight: 65.8 kg (145 lb)   Height: 165.1 cm (65\")      Body mass index is 24.13 kg/m².    Physical Exam  Vitals and nursing note reviewed.   Constitutional:       Appearance: Normal appearance.   HENT:      Head: Normocephalic and atraumatic.      Left Ear: Tympanic membrane normal.      Ears:      Comments: Right with dull tympanic membrane left normal light reflex     Nose: No rhinorrhea.      Mouth/Throat:      Pharynx: No posterior oropharyngeal erythema.   Cardiovascular:      Rate and Rhythm: Normal rate and regular rhythm. "   Pulmonary:      Effort: Pulmonary effort is normal.      Breath sounds: Normal breath sounds.      Comments: Decreased breath sounds left lower base but status post surgery in past no rales rhonchi or wheezing  Abdominal:      Palpations: Abdomen is soft.   Musculoskeletal:      Cervical back: Normal range of motion and neck supple.   Neurological:      General: No focal deficit present.      Mental Status: She is alert and oriented to person, place, and time.          RESULTS REVIEW  Lab Results   Component Value Date    PROBNP 25,718.0 (H) 07/15/2024    PROBNP 29,939.0 (H) 06/27/2024    PROBNP 18,671.0 (H) 05/30/2024     09/09/2019     04/11/2019     CMP          7/2/2024    14:02 7/15/2024    09:54 7/29/2024    10:50   CMP   Glucose 124  124  105    BUN 23  39  30    Creatinine 2.80  4.18  4.06    EGFR 15.9  9.8  10.2    Sodium 140  141  138    Potassium 3.7  4.6  4.4    Chloride 96  98  95    Calcium 8.5  8.5  8.2    Total Protein 5.7      Total Protein 5.9  6.1  5.8    Albumin 3.7     3.3  3.8  3.5    Globulin 2.4      Globulin 2.2  2.3  2.3    Total Bilirubin 0.3  0.3  0.3    Alkaline Phosphatase 164  159  168    AST (SGOT) 13  20  18    ALT (SGPT) 7  7  11    Albumin/Globulin Ratio 1.7  1.7  1.5    BUN/Creatinine Ratio 8.2  9.3  7.4    Anion Gap 9.7  11.4  10.7      CBC w/diff          7/15/2024    09:54 7/24/2024    00:00 7/29/2024    10:50   CBC w/Diff   WBC 6.77   4.84    RBC 3.46   3.56    Hemoglobin 10.7  10.8        32.4     10.8    Hematocrit 35.5   36.0    .6   101.1    MCH 30.9   30.3    MCHC 30.1   30.0    RDW 14.8   14.1    Platelets 173   103    Neutrophil Rel % 75.7   71.7    Immature Granulocyte Rel % 0.3   0.2    Lymphocyte Rel % 10.6   13.4    Monocyte Rel % 12.1   12.0    Eosinophil Rel % 0.9   2.3    Basophil Rel % 0.4   0.4       Details          This result is from an external source.              Lipid Panel          6/27/2024    09:46   Lipid Panel   Total  Cholesterol 126    Triglycerides 55    HDL Cholesterol 88    VLDL Cholesterol 12    LDL Cholesterol  26    LDL/HDL Ratio 0.31       Lab Results   Component Value Date    TSH 7.250 (H) 06/27/2024    TSH 3.630 05/01/2024    TSH 1.650 10/29/2023      Lab Results   Component Value Date    FREET4 1.28 06/27/2024    FREET4 1.34 05/01/2024    FREET4 1.4 08/25/2020      A1C Last 3 Results          4/19/2024    00:00 5/29/2024    00:00 7/17/2024    00:00   HGBA1C Last 3 Results   Hemoglobin A1C 5.7     5.4     5.4          Details          This result is from an external source.              Lab Results   Component Value Date    VCPVJRGV23 579 06/27/2024    SVGI94KL 76.4 07/17/2024    MG 2.4 07/29/2024        XR Chest 1 View    Result Date: 7/29/2024  Impression: 1. Chronic appearing scarring and pleural thickening in the left hemithorax, stable. 2. Ill-defined right perihilar density in the right lung which appears unchanged. No acute infiltrates are identified. 3. Right-sided dialysis catheter appears in appropriate position Electronically Signed: León Frederick MD  7/29/2024 11:03 AM EDT  Workstation ID: [id number]    XR Chest 1 View    Result Date: 7/15/2024  Impression: 1.New small right pleural effusion. No significant change otherwise noted. Electronically Signed: Pete Root MD  7/15/2024 10:07 AM EDT  Workstation ID: [id number]    CT Angiogram Abdomen Pelvis    Result Date: 5/31/2024   1. High-grade arterial stenoses involve the origins of the celiac trunk, superior mesenteric artery (SMA), and left renal artery.  2. Endovascular arterial stents are in place in the bilateral common iliac arteries and the left external iliac artery. They are patent.  3. 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  4. The right renal subcapsular hematoma has decreased in size since 3/8/2024.  5. Please see above comments for further  detail.     Please note that portions of this note were completed with a voice recognition program.       Electronically Signed By-Chicho Jones MD On:5/31/2024 11:38 PM      XR Chest 1 View    Result Date: 5/31/2024  Improved infiltrates in the right lung.  Electronically Signed By-Dr. Weston Castillo MD On:5/31/2024 5:48 AM      XR Chest 1 View    Result Date: 5/30/2024  New right basilar infiltrates concerning for pneumonia. The remainder of the chest x-ray is largely stable from prior.  Electronically Signed By-Dr. Weston Castillo MD On:5/30/2024 6:08 AM      XR Spine Thoracic 3 View    Result Date: 5/2/2024  Degenerative changes in the thoracic spine. No acute compression fractures. Alignment is stable.    Electronically Signed By-Dr. Weston Castillo MD On:5/2/2024 1:20 AM      XR Chest 1 View    Result Date: 5/1/2024  Impression: Stable exam. Stable chronic appearing changes in the left hemithorax and right midlung field.   Electronically Signed By-YARELIS LUCAS MD On:5/1/2024 8:29 PM      CT Chest Without Contrast Diagnostic    Result Date: 4/22/2024   Interval placement of a right IJ tunneled dialysis catheter (TDC) is noted since 3/13/2024, as discussed.  There has been interval decrease in (if not complete resolution of) the small right pleural effusion since 3/13/2024.  There is an air-fluid level in the right mainstem bronchus. It may represent mucus. Aspirated content cannot be excluded.  No definite acute infiltrate.  Otherwise, no significant interval change is appreciated since the prior exam from 3/13/2024.    Electronically Signed By-Chicho Jones MD On:4/22/2024 9:33 PM      XR Chest 1 View    Result Date: 4/21/2024  Impression: No significant change in comparison to 4/17/2024, as above.   Electronically Signed By-SAVI LUCERO MD On:4/21/2024 1:48 PM      XR Chest 1 View    Result Date: 4/17/2024  Impression: No change from previous study. Chronic changes of the chest with post operative  changes in the left thorax.   Electronically Signed By-Kimberli Alvarado MD On:4/17/2024 7:41 PM      XR Chest 1 View    Result Date: 4/14/2024  Stable appearance of the chest with no definite acute findings.  Electronically Signed By-Dr. Weston Castillo MD On:4/14/2024 10:38 PM                 ASSESSMENT & PLAN  Diagnoses and all orders for this visit:    1. Dizziness (Primary)  Comments:  Etiology may be from her current otitis media- will give meclizine 12.5 mg daily prndizziness caution with increased sedation SE.  Use Mucinex BID prn  Orders:  -     meclizine (ANTIVERT) 12.5 MG tablet; One tab po q d prn dizziness  Dispense: 10 tablet; Refill: 0    2. Right acute serous otitis media, recurrence not specified  Comments:  Use Mucinex twice a day as needed for congestion-s/p zpak last dose few days ago    3. Essential hypertension  Comments:  Elevated BP.  Recommend taking Imdur and losartan after her dialysis on Monday Wednesday and Friday-goal BP is less than 150/80    4. End stage renal disease on dialysis  Comments:  Had dialysis yesterday usually goes Monday Wednesday and Friday take losartan and Imdur after dialysis    5. Oxygen dependent    6. Chronic heart failure with preserved ejection fraction    7. Type 2 diabetes mellitus with diabetic peripheral angiopathy without gangrene, without long-term current use of insulin    8. Peripheral vascular disease of lower extremity  Overview:  ABIs 11/21  Right Conclusion: The right GILLIAN is moderately reduced. Waveforms are consistent with aorto-iliac disease and/or femoral disease. Mild digital ischemia.  Left Conclusion: The left GILLIAN is mildly reduced. Waveforms are consistent with aorto-iliac disease and/or femoral disease. Mild digital ischemia.           9. S/P renal artery angioplasty    10. Hypothyroidism (acquired)    11. Malignant neoplasm of upper lobe of right lung         BMI is within normal parameters. No other follow-up for BMI required.       Patient  Instructions   Start Mucinex 1 tablet twice a day for congestion  Try meclizine 12.5 mg 1 daily as needed dizziness caution with sedation  Take Imdur 30 mg 1 tablet after dialysis during dialysis days  Goal blood pressures less than 150/80  ENT referral if not better  Push fluids       Older notes reviewed  7/16/24 visit with Dr Edmundo Mosqueda    Acute shortness of breath, July 15, 2024 went to the emergency room was sent from there to dialysis,     Back pain, patient was asking for some pain medications discussed our role in neck care, can make referral to pain management, for possible narcotic usage on a long-term basis,     Generalized weakness      Frailty discussed with her frailty weakness that I would stop Trulicity completely which we have done and will monitor her sugars with Accu-Cheks continue Tradjenta as below,     End-stage renal disease ----on dialysis with tunneled dialysis catheter in the right upper chest wall, continues renal replacement with sevelamer carbonate 800 mg 3 times a day,      Hypoxia continues on oxygen, previous pneumonia,     Previous pneumonia, question about continuing her tobramycin nebulizer solutions?  July 16, 2024     Diabetes, continues Tradjenta 5 mg daily,,      Coronary artery disease, continues carvedilol 12.5 mg twice a day, aspirin 81 mg daily, Cozaar 50 mg daily, Imdur 30 mg daily----does follow-up with cardiology [facility]     GERD, continues Dexilant 60 mg daily and probiotics Florastor 250 mg twice a day     COPD, continues Brovana nebs twice a day, DuoNebs 4 times a day, ProAir inhaler as needed, Pulmicort nebs twice a day     Hypertension     Anemia chronic disease state will follow labs     PAD     Hyperlipidemia, continues rosuvastatin 20 mg daily     Lung cancer history--2005, chemo x 3 for right side, and partial lobectomy on the left side for recurrence of cancer,          FOLLOW UP  Return if symptoms worsen or fail to improve, for  Recheck.    Patient was given instructions and counseling regarding her condition or for health maintenance advice. Please see specific information pulled into the AVS if appropriate.

## 2024-08-01 NOTE — PATIENT INSTRUCTIONS
Start Mucinex 1 tablet twice a day for congestion  Try meclizine 12.5 mg 1 daily as needed dizziness caution with sedation  Take Imdur 30 mg 1 tablet after dialysis during dialysis days  Goal blood pressures less than 150/80  ENT referral if not better  Push fluids

## 2024-08-05 RX ORDER — DEXLANSOPRAZOLE 60 MG/1
60 CAPSULE, DELAYED RELEASE ORAL DAILY
Qty: 90 CAPSULE | Refills: 3 | Status: SHIPPED | OUTPATIENT
Start: 2024-08-05

## 2024-08-05 NOTE — TELEPHONE ENCOUNTER
Caller: Sanjuana Cornell    Relationship: Emergency Contact    Best call back number: [phone]     Requested Prescriptions:   Requested Prescriptions     Pending Prescriptions Disp Refills    dexlansoprazole (DEXILANT) 60 MG capsule       Sig: Take 1 capsule by mouth Daily.      Pharmacy where request should be sent: [address] [phone] [facility] [phone]      Last office visit with prescribing clinician: 7/16/2024   Last telemedicine visit with prescribing clinician: Visit date not found   Next office visit with prescribing clinician: 11/19/2024     Does the patient have less than a 3 day supply:  [] Yes  [x] No    Would you like a call back once the refill request has been completed: [] Yes [] No    If the office needs to give you a call back, can they leave a voicemail: [] Yes [] No    Cuate Chaudhry Rep   08/05/24 12:52 EDT

## 2024-08-06 ENCOUNTER — LAB (OUTPATIENT)
Dept: LAB | Facility: HOSPITAL | Age: 89
End: 2024-08-06
Payer: MEDICARE

## 2024-08-06 ENCOUNTER — TELEPHONE (OUTPATIENT)
Dept: CARDIOLOGY | Facility: CLINIC | Age: 89
End: 2024-08-06

## 2024-08-06 DIAGNOSIS — I35.0 AORTIC STENOSIS, MODERATE: ICD-10-CM

## 2024-08-06 DIAGNOSIS — D63.1 ANEMIA OF CHRONIC RENAL FAILURE, STAGE 4 (SEVERE): ICD-10-CM

## 2024-08-06 DIAGNOSIS — K21.9 GASTROESOPHAGEAL REFLUX DISEASE WITHOUT ESOPHAGITIS: ICD-10-CM

## 2024-08-06 DIAGNOSIS — Z99.2 END STAGE RENAL DISEASE ON DIALYSIS: ICD-10-CM

## 2024-08-06 DIAGNOSIS — I25.10 CORONARY ARTERY CALCIFICATION SEEN ON CT SCAN: ICD-10-CM

## 2024-08-06 DIAGNOSIS — Z98.62 S/P RENAL ARTERY ANGIOPLASTY: ICD-10-CM

## 2024-08-06 DIAGNOSIS — I73.9 PERIPHERAL VASCULAR DISEASE OF LOWER EXTREMITY: ICD-10-CM

## 2024-08-06 DIAGNOSIS — N18.6 END STAGE RENAL DISEASE ON DIALYSIS: ICD-10-CM

## 2024-08-06 DIAGNOSIS — I70.1 RENAL ARTERY STENOSIS: ICD-10-CM

## 2024-08-06 DIAGNOSIS — J44.0 COPD WITH LOWER RESPIRATORY INFECTION: ICD-10-CM

## 2024-08-06 DIAGNOSIS — I50.32 CHRONIC HEART FAILURE WITH PRESERVED EJECTION FRACTION: ICD-10-CM

## 2024-08-06 DIAGNOSIS — R54 FRAILTY SYNDROME IN GERIATRIC PATIENT: ICD-10-CM

## 2024-08-06 DIAGNOSIS — Z00.00 MEDICARE ANNUAL WELLNESS VISIT, SUBSEQUENT: ICD-10-CM

## 2024-08-06 DIAGNOSIS — E11.51 TYPE 2 DIABETES MELLITUS WITH DIABETIC PERIPHERAL ANGIOPATHY WITHOUT GANGRENE, WITHOUT LONG-TERM CURRENT USE OF INSULIN: ICD-10-CM

## 2024-08-06 DIAGNOSIS — N18.4 ANEMIA OF CHRONIC RENAL FAILURE, STAGE 4 (SEVERE): ICD-10-CM

## 2024-08-06 DIAGNOSIS — Z99.81 OXYGEN DEPENDENT: ICD-10-CM

## 2024-08-06 DIAGNOSIS — E03.9 HYPOTHYROIDISM (ACQUIRED): ICD-10-CM

## 2024-08-06 DIAGNOSIS — D47.2 MONOCLONAL PARAPROTEINEMIA: ICD-10-CM

## 2024-08-06 DIAGNOSIS — E55.9 VITAMIN D DEFICIENCY: ICD-10-CM

## 2024-08-06 DIAGNOSIS — I10 ESSENTIAL HYPERTENSION: ICD-10-CM

## 2024-08-06 LAB
ALBUMIN SERPL-MCNC: 3.4 G/DL (ref 3.5–5.2)
ALBUMIN/GLOB SERPL: 1.2 G/DL
ALP SERPL-CCNC: 179 U/L (ref 39–117)
ALT SERPL W P-5'-P-CCNC: 7 U/L (ref 1–33)
ANION GAP SERPL CALCULATED.3IONS-SCNC: 7.3 MMOL/L (ref 5–15)
AST SERPL-CCNC: 15 U/L (ref 1–32)
BILIRUB SERPL-MCNC: 0.4 MG/DL (ref 0–1.2)
BUN SERPL-MCNC: 19 MG/DL (ref 8–23)
BUN/CREAT SERPL: 6 (ref 7–25)
CALCIUM SPEC-SCNC: 8.7 MG/DL (ref 8.6–10.5)
CHLORIDE SERPL-SCNC: 95 MMOL/L (ref 98–107)
CHOLEST SERPL-MCNC: 94 MG/DL (ref 0–200)
CO2 SERPL-SCNC: 35.7 MMOL/L (ref 22–29)
CREAT SERPL-MCNC: 3.19 MG/DL (ref 0.57–1)
EGFRCR SERPLBLD CKD-EPI 2021: 13.6 ML/MIN/1.73
GLOBULIN UR ELPH-MCNC: 2.8 GM/DL
GLUCOSE SERPL-MCNC: 96 MG/DL (ref 65–99)
HDLC SERPL-MCNC: 54 MG/DL (ref 40–60)
LDLC SERPL CALC-MCNC: 25 MG/DL (ref 0–100)
LDLC/HDLC SERPL: 0.49 {RATIO}
MAGNESIUM SERPL-MCNC: 2 MG/DL (ref 1.6–2.4)
POTASSIUM SERPL-SCNC: 4.4 MMOL/L (ref 3.5–5.2)
PROT SERPL-MCNC: 6.2 G/DL (ref 6–8.5)
SODIUM SERPL-SCNC: 138 MMOL/L (ref 136–145)
T4 FREE SERPL-MCNC: 1.4 NG/DL (ref 0.92–1.68)
TRIGL SERPL-MCNC: 69 MG/DL (ref 0–150)
TROPONIN T SERPL HS-MCNC: 90 NG/L
TSH SERPL DL<=0.05 MIU/L-ACNC: 0.72 UIU/ML (ref 0.27–4.2)
VLDLC SERPL-MCNC: 15 MG/DL (ref 5–40)

## 2024-08-06 PROCEDURE — 84484 ASSAY OF TROPONIN QUANT: CPT

## 2024-08-06 PROCEDURE — 80061 LIPID PANEL: CPT

## 2024-08-06 PROCEDURE — 80053 COMPREHEN METABOLIC PANEL: CPT

## 2024-08-06 PROCEDURE — 36415 COLL VENOUS BLD VENIPUNCTURE: CPT

## 2024-08-06 PROCEDURE — 84443 ASSAY THYROID STIM HORMONE: CPT

## 2024-08-06 PROCEDURE — 84439 ASSAY OF FREE THYROXINE: CPT

## 2024-08-06 PROCEDURE — 83735 ASSAY OF MAGNESIUM: CPT

## 2024-08-13 ENCOUNTER — OFFICE VISIT (OUTPATIENT)
Dept: CARDIOLOGY | Facility: CLINIC | Age: 89
End: 2024-08-13
Payer: MEDICARE

## 2024-08-13 VITALS
DIASTOLIC BLOOD PRESSURE: 51 MMHG | HEART RATE: 56 BPM | HEIGHT: 65 IN | BODY MASS INDEX: 23.03 KG/M2 | SYSTOLIC BLOOD PRESSURE: 136 MMHG | WEIGHT: 138.2 LBS

## 2024-08-13 DIAGNOSIS — I10 ESSENTIAL HYPERTENSION: ICD-10-CM

## 2024-08-13 DIAGNOSIS — R76.9 ABNORMAL SERUM IMMUNOELECTROPHORESIS: ICD-10-CM

## 2024-08-13 DIAGNOSIS — E78.5 HYPERLIPIDEMIA LDL GOAL <70: ICD-10-CM

## 2024-08-13 DIAGNOSIS — Z99.2 END STAGE RENAL DISEASE ON DIALYSIS: ICD-10-CM

## 2024-08-13 DIAGNOSIS — N18.6 END STAGE RENAL DISEASE ON DIALYSIS: ICD-10-CM

## 2024-08-13 DIAGNOSIS — I50.32 CHRONIC HEART FAILURE WITH PRESERVED EJECTION FRACTION: Primary | ICD-10-CM

## 2024-08-13 RX ORDER — AMLODIPINE BESYLATE 2.5 MG/1
2.5 TABLET ORAL AS NEEDED
Qty: 90 TABLET | Refills: 3 | Status: SHIPPED | OUTPATIENT
Start: 2024-08-13

## 2024-08-13 NOTE — ASSESSMENT & PLAN NOTE
Patient's renal function remains stable.  She is on hemodialysis Monday Wednesday and Friday.  I will continue to monitor her labs.

## 2024-08-13 NOTE — ASSESSMENT & PLAN NOTE
Ms. Rai has heart failure with preserved ejection fraction who appears to be doing very well today.  Her biggest complaint is dizziness which has almost completely resolved with the use of meclizine.  Her pressures are very well maintaining today but per her home log systolic blood pressure generally runs very high.  She is unable to utilize an SGLT2 due to stage IV renal disease on hemodialysis.  Ms. Mcknight underwent a workup for amyloidosis that was negative although her immunofixation protein electrophoresis did demonstrate an M spike.  Will refer to Dr. Medrano.  Will make the following changes to her medications:    1.  Start amlodipine 2.5 mg take 1 tablet for a systolic blood pressure greater than 150.  2.  Continue rest of medication as prescribed.  3.  Do a heart rate blood pressure and weight log and bring it to the office at next visit.  Stop by and do a blood pressure comparison with home cuff to ensure accuracy.  4.  Do blood work 1 to 2 days prior to your next visit.

## 2024-08-13 NOTE — ASSESSMENT & PLAN NOTE
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 Her ratio is 1.73. Hemoglobin is 10.8. Her PYP had no uptake.  Will do a referral to Dr. Medrano.

## 2024-08-13 NOTE — PATIENT INSTRUCTIONS
1.  Start amlodipine 2.5 mg take 1 tablet for a systolic blood pressure greater than 150.  2.  Continue rest of medication as prescribed.  3.  Do a heart rate blood pressure and weight log and bring it to the office at next visit.  Stop by and do a blood pressure comparison with home cuff to ensure accuracy.  4.  Do blood work 1 to 2 days prior to your next visit.

## 2024-08-13 NOTE — PROGRESS NOTES
Office Visit    Chief Complaint  Acute on chronic heart failure with preserved ejection fract    Subjective            Charlette Rai presents to [facility] CARDIOLOGY  History of Present Illness  Ms. Charlette Rai is an 89-year-old female that presented to the office today for heart failure with preserved ejection fraction.  She has been feeling a little better.  She did have an episode of dizziness of which she went to the hospital.  She believes it was related to her right ear she was seen by her PCP which prescribed meclizine.  The episodes of dizziness have almost completely resolved.  When performing her workup for amyloidosis troponin was elevated to 90.  Spoke with patient, she does not have any symptoms associated with angina.  She continues to deny symptoms today.  We will do a EKG to compare to the one done last week.  She denies any chest pain, orthopnea, back pain, shoulder pain or syncopal episodes.      Past Medical History:   Diagnosis Date    Allergic rhinitis     Anaphylactic shock, unspecified, initial encounter 12/05/2023    Anemia     Arthritis     Asthma     USE INHALERS AND NEBULIZERS    Back pain     Bladder disorder     Cancer     LEFT LUNG CANCER 2005 SURGERY AND CHEMO DONE  AND CURRENTLY 4/ 14/22  RIGHT LUNG CANCER HAS ONLY RECEIVED RADIATION THUS FAR    Chronic kidney disease     stage 3    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 DENIES CP BUT DOES HAVE SOA CHRONIC ISSUE COPD/LUNG CANCER    COPD (chronic obstructive pulmonary disease)     FOLLOWED BY DR MARIAJOSE BAILEY    Coronary artery disease     DENIES CP BUT DOES GET SOA MOST OF THE TIME WITH EXERTION BUT OCC AT REST CHRONIC ISSUE COPD/LUNG CANCER    Deep vein thrombosis     Diabetes mellitus     DOES NOT CHECK BS DAILY    Disease of thyroid gland      HYPOTHYROIDISM    Essential hypertension     Gastric ulcer     GERD (gastroesophageal reflux disease)     Heart murmur     History of transfusion     NO ISSUES POST TRANSFUSION WAS MANY YEARS AGO    Hyperlipemia     Leukocytopenia     FOLLOWED BY DR MIR BAILEY    Limb swelling     Lumbago     Lumbar spinal stenosis     Lung cancer     Migraine headache     Multiple joint pain     On home oxygen therapy     3L/NC PRN    Osteopenia     PNA (pneumonia) 05/04/2024    Pseudomonas pneumonia 04/29/2024    Reflux esophagitis     Shortness of breath     Thyroid nodule     HAS ULTRASOUND YEARLY BEING MONITORED    Vascular disease     Vitamin D deficiency        No Known Allergies     Past Surgical History:   Procedure Laterality Date    ABDOMINAL SURGERY      ANGIOPLASTY RENAL ARTERY Left 06/14/2024    stent placement    APPENDECTOMY      ARTERIOGRAM N/A 6/14/2024    Procedure: Arteriogram, right radial access, aortogram and renal arteriogram with possible intervention.;  Surgeon: Dio Miguel MD;  Location: [facility] CATH INVASIVE LOCATION;  Service: Peripheral Vascular;  Laterality: N/A;    ARTERIOVENOUS FISTULA/SHUNT SURGERY Left 05/10/2022    Procedure: Left basilic vein transposition;  Surgeon: Wan Barksdale MD;  Location: [facility] MAIN OR;  Service: Vascular;  Laterality: Left;    ARTERIOVENOUS FISTULA/SHUNT SURGERY Left 03/23/2023    Procedure: Ligation of left arm arteriovenous fistula;  Surgeon: Wan Barksdale MD;  Location: [facility] MAIN OR;  Service: Vascular;  Laterality: Left;    ARTERIOVENOUS FISTULA/SHUNT SURGERY Left 11/30/2023    Procedure: Creation of left arm arteriovenous graft;  Surgeon: Wan Barksdale MD;  Location: [facility] MAIN OR;  Service: Vascular;  Laterality: Left;    BRONCHOSCOPY N/A 04/24/2024    Procedure: BRONCHOSCOPY WITH BAL AND WASHINGS;  Surgeon: Khalif Yanez MD;  Location: [facility] ENDOSCOPY;  Service: Pulmonary;  Laterality: N/A;  MUCUS PLUGGING    CARDIAC CATHETERIZATION  1996    CARDIAC  SURGERY      CARDIAC SURGERY      fluid drained from heart    CATARACT EXTRACTION, BILATERAL      COLONOSCOPY  2014    ENDOSCOPY  2016    FEMORAL ARTERY STENT Bilateral     HYSTERECTOMY      LUNG BIOPSY Left 2005    lobectomy upper lung caner    LUNG VOLUME REDUCTION      OTHER SURGICAL HISTORY      artifical joints/limbs    REPLACEMENT TOTAL KNEE Left 2016    UPPER GASTROINTESTINAL ENDOSCOPY          Social History     Tobacco Use    Smoking status: Former     Current packs/day: 0.00     Average packs/day: 1 pack/day for 52.5 years (52.5 ttl pk-yrs)     Types: Cigarettes     Start date:      Quit date: 2004     Years since quittin.1     Passive exposure: Past    Smokeless tobacco: Never   Vaping Use    Vaping status: Never Used   Substance Use Topics    Alcohol use: Never    Drug use: Never       Family History   Problem Relation Age of Onset    Arthritis Mother     Arthritis Father     Cancer Brother     Diabetes Brother     Other Brother         blood disease     Prostate cancer Brother     Cancer Brother     Prostate cancer Brother     Cancer Brother     Cancer Brother     Malig Hyperthermia Neg Hx     Colon cancer Neg Hx         Prior to Admission medications    Medication Sig Start Date End Date Taking? Authorizing Provider   acetaminophen (TYLENOL) 325 MG tablet Take 2 tablets by mouth Every 6 (Six) Hours As Needed for Mild Pain.    ProviderSarah MD   albuterol sulfate  (90 Base) MCG/ACT inhaler Inhale 2 puffs Every 4 (Four) Hours As Needed for Wheezing.    ProviderSarah MD   aspirin 81 MG EC tablet Take 1 tablet by mouth Daily.    ProviderSarah MD   budesonide (Pulmicort) 0.5 MG/2ML nebulizer solution Take 2 mL by nebulization Daily. 24   Brennan Mosqueda MD   carvedilol (COREG) 12.5 MG tablet Take 1 tablet by mouth 2 (Two) Times a Day With Meals for 30 days. 24  Rafael Lyons MD   clopidogrel (Plavix) 75 MG tablet Take 1 tablet  by mouth Daily. 6/15/24 6/15/25  Dio Miguel MD   dexlansoprazole (DEXILANT) 60 MG capsule Take 1 capsule by mouth Daily. 8/5/24   Brennan Mosqueda MD   furosemide (LASIX) 20 MG tablet Take 1 tablet by mouth Daily. 6/18/24   Halima Quick APRN   HYDROcodone-acetaminophen (Norco) 5-325 MG per tablet Take 1 tablet by mouth Every 8 (Eight) Hours As Needed for Moderate Pain.  Patient taking differently: Take 1 tablet by mouth Every 8 (Eight) Hours As Needed for Moderate Pain. PRN 6/19/24   Brennan Mosqueda MD   ipratropium-albuterol (DUO-NEB) 0.5-2.5 mg/3 ml nebulizer Take 3 mL by nebulization Every 4 (Four) Hours As Needed for Shortness of Air or Wheezing for up to 30 days. 4/29/24 7/16/24  Rafael Lyons MD   isosorbide dinitrate (ISORDIL) 20 MG tablet Take 1 tablet by mouth 3 (Three) Times a Day for 30 days. 4/29/24 7/2/24  Rafael Lyons MD   isosorbide mononitrate (IMDUR) 30 MG 24 hr tablet Take 1 tablet by mouth Daily. 7/16/24   Brennan Mosqueda MD   levocetirizine (XYZAL) 5 MG tablet TAKE 1 TABLET DAILY  Patient taking differently: Take 1 tablet by mouth Every Evening. 6/23/23   Paloma James MD   levothyroxine (Synthroid) 50 MCG tablet Take 0.5 tablets by mouth Daily. 7/2/24   Maria Teresa Malhotra MD   linagliptin (Tradjenta) 5 MG tablet tablet Take 1 tablet by mouth Daily. 1/10/22   Paloma James MD   losartan (COZAAR) 100 MG tablet Take 1 tablet by mouth Daily for 30 days. 6/4/24 7/16/24  Pieter Salgado MD   losartan (Cozaar) 50 MG tablet Take 1 tablet by mouth 2 (Two) Times a Day. 7/16/24   Brennan Mosqueda MD   meclizine (ANTIVERT) 12.5 MG tablet One tab po q d prn dizziness 8/1/24   Karen Carrasco MD   Methoxy PEG-Epoetin Beta (MIRCERA IJ) 60 mcg Every 14 (Fourteen) Days. 7/5/24 7/4/25  Provider, MD Sarah   Methylcellulose, Laxative, (FIBER THERAPY PO) Take 2 tablets by mouth Daily.    Provider, MD Sarah   nitroglycerin  "(NITROSTAT) 0.4 MG SL tablet Place 1 tablet under the tongue Every 5 (Five) Minutes As Needed for Chest Pain.    Sarah Parker MD   rosuvastatin (CRESTOR) 20 MG tablet Take 1 tablet by mouth Daily. 7/7/23   León Sanchez MD   saccharomyces boulardii (FLORASTOR) 250 MG capsule Take 1 capsule by mouth 2 (Two) Times a Day.    Sarah Parker MD   sevelamer (RENVELA) 800 MG tablet Take 1 tablet by mouth 3 (Three) Times a Day With Meals. 10/9/23   Saarh Parker MD   spironolactone (ALDACTONE) 25 MG tablet Take 1 tablet by mouth Every Other Day. 7/2/24   Maria Teresa Malhotra MD   tobramycin (JUAN) Take 5 mL by nebulization 2 (Two) Times a Day.    Sarah Parker MD   vitamin D (ERGOCALCIFEROL) 1.25 MG (71880 UT) capsule capsule Take 1 capsule by mouth Every 14 (Fourteen) Days.    ProviderSarah MD        Review of Systems   Constitutional:  Positive for fatigue.   Respiratory:  Positive for cough and shortness of breath.    Cardiovascular:  Positive for leg swelling. Negative for chest pain and palpitations.   Neurological:  Positive for dizziness.        Symptom Course: Been Stable    Weight Trend: Stable     Objective     /51   Pulse 56   Ht 165.1 cm (65\")   Wt 62.7 kg (138 lb 3.2 oz)   BMI 23.00 kg/m²       Physical Exam  Constitutional:       General: She is awake.      Appearance: Normal appearance.   Neck:      Thyroid: No thyromegaly.      Vascular: No carotid bruit or JVD.   Cardiovascular:      Rate and Rhythm: Normal rate and regular rhythm.      Chest Wall: PMI is not displaced.      Pulses: Normal pulses.      Heart sounds: Normal heart sounds, S1 normal and S2 normal. Murmur heard.      Systolic murmur is present.      No friction rub. No gallop. No S3 or S4 sounds.   Pulmonary:      Effort: Pulmonary effort is normal.      Breath sounds: Normal breath sounds and air entry. No wheezing, rhonchi or rales.   Abdominal:      General: Bowel sounds are normal.      " Palpations: Abdomen is soft. There is no mass.      Tenderness: There is no abdominal tenderness.   Musculoskeletal:      Cervical back: Neck supple.      Right lower leg: No edema.      Left lower leg: No edema.   Neurological:      Mental Status: She is alert and oriented to person, place, and time.   Psychiatric:         Mood and Affect: Mood normal.         Behavior: Behavior is cooperative.           Result Review :                    ECG 12 Lead    Date/Time: 8/13/2024 3:59 PM  Performed by: Halima Quick APRN    Authorized by: Halima Quick APRN  Comments: Sinus rhythm with a rate of 77, left ventricular hypertrophy, anterior Q waves possible due to LVH, nonspecific T abnormality, lateral leads, no significant changes compared to prior EKG.         Lab Results   Component Value Date    PROBNP 25,718.0 (H) 07/15/2024    PROBNP 29,939.0 (H) 06/27/2024    PROBNP 18,671.0 (H) 05/30/2024     09/09/2019     04/11/2019     CMP          7/15/2024    09:54 7/29/2024    10:50 8/6/2024    09:26   CMP   Glucose 124  105  96    BUN 39  30  19    Creatinine 4.18  4.06  3.19    EGFR 9.8  10.2  13.6    Sodium 141  138  138    Potassium 4.6  4.4  4.4    Chloride 98  95  95    Calcium 8.5  8.2  8.7    Total Protein 6.1  5.8  6.2    Albumin 3.8  3.5  3.4    Globulin 2.3  2.3  2.8    Total Bilirubin 0.3  0.3  0.4    Alkaline Phosphatase 159  168  179    AST (SGOT) 20  18  15    ALT (SGPT) 7  11  7    Albumin/Globulin Ratio 1.7  1.5  1.2    BUN/Creatinine Ratio 9.3  7.4  6.0    Anion Gap 11.4  10.7  7.3      CBC w/diff          7/29/2024    10:50 7/31/2024    00:00 8/7/2024    00:00   CBC w/Diff   WBC 4.84      RBC 3.56      Hemoglobin 10.8  11.2        33.6     10.5        31.5       Hematocrit 36.0      .1      MCH 30.3      MCHC 30.0      RDW 14.1      Platelets 103      Neutrophil Rel % 71.7      Immature Granulocyte Rel % 0.2      Lymphocyte Rel % 13.4      Monocyte Rel % 12.0      Eosinophil Rel  "% 2.3      Basophil Rel % 0.4         Details          This result is from an external source.              Lipid Panel          6/27/2024    09:46 8/6/2024    09:26   Lipid Panel   Total Cholesterol 126  94    Triglycerides 55  69    HDL Cholesterol 88  54    VLDL Cholesterol 12  15    LDL Cholesterol  26  25    LDL/HDL Ratio 0.31  0.49       Lab Results   Component Value Date    TSH 0.724 08/06/2024    TSH 7.250 (H) 06/27/2024    TSH 3.630 05/01/2024      Lab Results   Component Value Date    FREET4 1.40 08/06/2024    FREET4 1.28 06/27/2024    FREET4 1.34 05/01/2024      D-Dimer, Quantitative   Date Value Ref Range Status   03/13/2024 4.30 (H) 0.00 - 0.87 MCGFEU/mL Final     Magnesium   Date Value Ref Range Status   08/06/2024 2.0 1.6 - 2.4 mg/dL Final      No results found for: \"DIGOXIN\"   A1C Last 3 Results          4/19/2024    00:00 5/29/2024    00:00 7/17/2024    00:00   HGBA1C Last 3 Results   Hemoglobin A1C 5.7     5.4     5.4          Details          This result is from an external source.                  Results for orders placed during the hospital encounter of 05/30/24    Adult Transthoracic Echo Complete W/ Cont if Necessary Per Protocol    Interpretation Summary    Left ventricular systolic function is normal. Estimated left ventricular EF = 55%    Left ventricular wall thickness is consistent with mild to moderate concentric hypertrophy. Sigmoid-shaped ventricular septum is present.    Left ventricular diastolic function is consistent with (grade Ia w/high LAP) impaired relaxation.    The left atrial cavity is mildly dilated.    Moderate aortic valve stenosis is present.    Moderate mitral valve stenosis is present.    Estimated right ventricular systolic pressure from tricuspid regurgitation is moderately elevated (45-55 mmHg).        Assessment and Plan        Diagnoses and all orders for this visit:    1. Chronic heart failure with preserved ejection fraction (Primary)  Assessment & Plan:  Ms. " Cecelia has heart failure with preserved ejection fraction who appears to be doing very well today.  Her biggest complaint is dizziness which has almost completely resolved with the use of meclizine.  Her pressures are very well maintaining today but per her home log systolic blood pressure generally runs very high.  She is unable to utilize an SGLT2 due to stage IV renal disease on hemodialysis.  Ms. Mckngiht underwent a workup for amyloidosis that was negative although her immunofixation protein electrophoresis did demonstrate an M spike.  Will refer to Dr. Medrano.  Will make the following changes to her medications:    1.  Start amlodipine 2.5 mg take 1 tablet for a systolic blood pressure greater than 150.  2.  Continue rest of medication as prescribed.  3.  Do a heart rate blood pressure and weight log and bring it to the office at next visit.  Stop by and do a blood pressure comparison with home cuff to ensure accuracy.  4.  Do blood work 1 to 2 days prior to your next visit.    Orders:  -     Magnesium; Future  -     CBC & Differential; Future  -     Comprehensive Metabolic Panel; Future  -     proBNP; Future    2. Essential hypertension  Assessment & Plan:  Ms. Rai's blood pressure is good today but per her home logs it tends to run extremely high.  I have asked her to bring in her blood pressure machine and compare it to a manual to check for accuracy.  She is a dialysis patient and I am reluctant to change her statin.  I will add amlodipine 2.5 mg daily as needed for a systolic greater than 150.      3. Hyperlipidemia LDL goal <70  Assessment & Plan:  Ms. close lipids are at goal.  Will continue Crestor at the current dose.      4. End stage renal disease on dialysis  Assessment & Plan:  Patient's renal function remains stable.  She is on hemodialysis Monday Wednesday and Friday.  I will continue to monitor her labs.    Orders:  -     Magnesium; Future  -     CBC & Differential; Future  -     Comprehensive  Metabolic Panel; Future  -     proBNP; Future    5. 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 Her ratio is 1.73. Hemoglobin is 10.8. Her PYP had no uptake.  Will do a referral to Dr. Medrano.    Orders:  -     Ambulatory Referral to Hematology / Oncology    Other orders  -     amLODIPine (NORVASC) 2.5 MG tablet; Take 1 tablet by mouth As Needed (Once daily for a systolic blood pressure greater than 150). For a systolic blood pressure greater than 150  Dispense: 90 tablet; Refill: 3  -     ECG 12 Lead            Follow Up     Return in about 6 weeks (around 9/24/2024).    Patient was given instructions and counseling regarding her condition or for health maintenance advice. Please see specific information pulled into the AVS if appropriate.     Electronically signed by DAT Dexter, 08/13/24, 3:59 PM EDT.

## 2024-08-13 NOTE — ASSESSMENT & PLAN NOTE
Ms. Rai's blood pressure is good today but per her home logs it tends to run extremely high.  I have asked her to bring in her blood pressure machine and compare it to a manual to check for accuracy.  She is a dialysis patient and I am reluctant to change her statin.  I will add amlodipine 2.5 mg daily as needed for a systolic greater than 150.

## 2024-08-19 ENCOUNTER — PREP FOR SURGERY (OUTPATIENT)
Dept: OTHER | Facility: HOSPITAL | Age: 89
End: 2024-08-19
Payer: MEDICARE

## 2024-08-19 ENCOUNTER — TELEPHONE (OUTPATIENT)
Dept: INTERNAL MEDICINE | Age: 89
End: 2024-08-19
Payer: MEDICARE

## 2024-08-19 DIAGNOSIS — H65.90 FLUID LEVEL BEHIND TYMPANIC MEMBRANE, UNSPECIFIED LATERALITY: Primary | ICD-10-CM

## 2024-08-19 DIAGNOSIS — Z99.2 ESRD (END STAGE RENAL DISEASE) ON DIALYSIS: Primary | ICD-10-CM

## 2024-08-19 DIAGNOSIS — N18.6 ESRD (END STAGE RENAL DISEASE) ON DIALYSIS: Primary | ICD-10-CM

## 2024-08-19 NOTE — TELEPHONE ENCOUNTER
ENT referral placed to Dr. Trujillo per Dr. Mosqueda:     Brennan Mosqueda MD    You1 hour ago (2:34 PM)  Okay to schedule with Dr. Trujillo     LVM informing pt of referral being placed.

## 2024-08-19 NOTE — TELEPHONE ENCOUNTER
Caller: Sanjuana Cornell    Relationship: Emergency Contact    Best call back number: [phone]     What is the medical concern/diagnosis: FLUID IN EARS/ BARLEY CAN EAR     What specialty or service is being requested: EAR NOSE THROAT DOCTOR     What is the provider, practice or medical service name: ANY PROVIDER THAT 'S CHOOSE.     What is the office location: [place]       Any additional details: PLEASE CALL AND ADVISE.     YOU CAN EVEN CALL PATIENT TOO.

## 2024-08-21 ENCOUNTER — APPOINTMENT (OUTPATIENT)
Dept: GENERAL RADIOLOGY | Facility: HOSPITAL | Age: 89
End: 2024-08-21
Payer: MEDICARE

## 2024-08-21 ENCOUNTER — HOSPITAL ENCOUNTER (INPATIENT)
Facility: HOSPITAL | Age: 89
LOS: 5 days | Discharge: HOME HEALTH | End: 2024-08-26
Attending: SURGERY | Admitting: SURGERY
Payer: MEDICARE

## 2024-08-21 DIAGNOSIS — Z99.2 ESRD (END STAGE RENAL DISEASE) ON DIALYSIS: ICD-10-CM

## 2024-08-21 DIAGNOSIS — N18.6 ESRD (END STAGE RENAL DISEASE) ON DIALYSIS: ICD-10-CM

## 2024-08-21 DIAGNOSIS — Z78.9 DECREASED ACTIVITIES OF DAILY LIVING (ADL): Primary | ICD-10-CM

## 2024-08-21 DIAGNOSIS — R26.2 DIFFICULTY WALKING: ICD-10-CM

## 2024-08-21 PROBLEM — R09.02 HYPOXIA: Status: ACTIVE | Noted: 2024-08-21

## 2024-08-21 LAB
ANION GAP SERPL CALCULATED.3IONS-SCNC: 6.7 MMOL/L (ref 5–15)
ANION GAP SERPL CALCULATED.3IONS-SCNC: 7.3 MMOL/L (ref 5–15)
APTT PPP: 28.7 SECONDS (ref 24.2–34.2)
ARTERIAL PATENCY WRIST A: POSITIVE
ATMOSPHERIC PRESS: 748.1 MMHG
BASE EXCESS BLDA CALC-SCNC: 9.5 MMOL/L (ref -2–2)
BASOPHILS # BLD AUTO: 0.03 10*3/MM3 (ref 0–0.2)
BASOPHILS NFR BLD AUTO: 0.4 % (ref 0–1.5)
BDY SITE: ABNORMAL
BUN BLDA-MCNC: 22 MG/DL (ref 8–23)
BUN SERPL-MCNC: 23 MG/DL (ref 8–23)
BUN SERPL-MCNC: 23 MG/DL (ref 8–23)
BUN/CREAT SERPL: 6.6 (ref 7–25)
BUN/CREAT SERPL: 7.2 (ref 7–25)
CA-I BLDA-SCNC: 1.22 MMOL/L (ref 1.13–1.32)
CALCIUM SPEC-SCNC: 8.4 MG/DL (ref 8.6–10.5)
CALCIUM SPEC-SCNC: 8.7 MG/DL (ref 8.6–10.5)
CHLORIDE BLDA-SCNC: 96 MMOL/L (ref 98–107)
CHLORIDE SERPL-SCNC: 94 MMOL/L (ref 98–107)
CHLORIDE SERPL-SCNC: 96 MMOL/L (ref 98–107)
CO2 BLDA-SCNC: >34.54 MMOL/L (ref 23–27)
CO2 SERPL-SCNC: 31.7 MMOL/L (ref 22–29)
CO2 SERPL-SCNC: 34.3 MMOL/L (ref 22–29)
CREAT BLDA-MCNC: 3.45 MG/DL (ref 0.6–1.3)
CREAT SERPL-MCNC: 3.18 MG/DL (ref 0.57–1)
CREAT SERPL-MCNC: 3.46 MG/DL (ref 0.57–1)
D-LACTATE SERPL-SCNC: <0.4 MMOL/L
DEPRECATED RDW RBC AUTO: 52.2 FL (ref 37–54)
EGFRCR SERPLBLD CKD-EPI 2021: 12.3 ML/MIN/1.73
EGFRCR SERPLBLD CKD-EPI 2021: 13.6 ML/MIN/1.73
EOSINOPHIL # BLD AUTO: 0.06 10*3/MM3 (ref 0–0.4)
EOSINOPHIL NFR BLD AUTO: 0.8 % (ref 0.3–6.2)
ERYTHROCYTE [DISTWIDTH] IN BLOOD BY AUTOMATED COUNT: 13.9 % (ref 12.3–15.4)
GEN 5 2HR TROPONIN T REFLEX: 58 NG/L
GLUCOSE BLDC GLUCOMTR-MCNC: 133 MG/DL (ref 70–99)
GLUCOSE BLDC GLUCOMTR-MCNC: 141 MG/DL (ref 65–99)
GLUCOSE SERPL-MCNC: 128 MG/DL (ref 65–99)
GLUCOSE SERPL-MCNC: 154 MG/DL (ref 65–99)
HCO3 BLDA-SCNC: 38 MMOL/L (ref 22–26)
HCT VFR BLD AUTO: 34 % (ref 34–46.6)
HCT VFR BLD CALC: 32 % (ref 38–51)
HEMODILUTION: NO
HGB BLD-MCNC: 10.1 G/DL (ref 12–15.9)
HGB BLDA-MCNC: 10.8 G/DL (ref 12–18)
IMM GRANULOCYTES # BLD AUTO: 0.03 10*3/MM3 (ref 0–0.05)
IMM GRANULOCYTES NFR BLD AUTO: 0.4 % (ref 0–0.5)
INHALED O2 CONCENTRATION: 100 %
INR PPP: 1.12 (ref 0.86–1.15)
LYMPHOCYTES # BLD AUTO: 0.6 10*3/MM3 (ref 0.7–3.1)
LYMPHOCYTES NFR BLD AUTO: 7.5 % (ref 19.6–45.3)
Lab: ABNORMAL
MCH RBC QN AUTO: 30.2 PG (ref 26.6–33)
MCHC RBC AUTO-ENTMCNC: 29.7 G/DL (ref 31.5–35.7)
MCV RBC AUTO: 101.8 FL (ref 79–97)
MODALITY: ABNORMAL
MONOCYTES # BLD AUTO: 0.71 10*3/MM3 (ref 0.1–0.9)
MONOCYTES NFR BLD AUTO: 8.9 % (ref 5–12)
NEUTROPHILS NFR BLD AUTO: 6.57 10*3/MM3 (ref 1.7–7)
NEUTROPHILS NFR BLD AUTO: 82 % (ref 42.7–76)
NOTIFIED WHO: ABNORMAL
NOTIFIED WHO: ABNORMAL
NRBC BLD AUTO-RTO: 0 /100 WBC (ref 0–0.2)
NT-PROBNP SERPL-MCNC: ABNORMAL PG/ML (ref 0–1800)
PCO2 BLDA: 75.4 MM HG (ref 35–45)
PEEP RESPIRATORY: 5 CM[H2O]
PH BLDA: 7.31 PH UNITS (ref 7.35–7.45)
PLATELET # BLD AUTO: 154 10*3/MM3 (ref 140–450)
PMV BLD AUTO: 9.5 FL (ref 6–12)
PO2 BLD: 538 MM[HG] (ref 0–500)
PO2 BLDA: 537.6 MM HG (ref 80–100)
POTASSIUM BLDA-SCNC: 4.8 MMOL/L (ref 3.5–5)
POTASSIUM SERPL-SCNC: 5 MMOL/L (ref 3.5–5.2)
POTASSIUM SERPL-SCNC: 5.5 MMOL/L (ref 3.5–5.2)
PROTHROMBIN TIME: 14.6 SECONDS (ref 11.8–14.9)
QT INTERVAL: 412 MS
QTC INTERVAL: 484 MS
RBC # BLD AUTO: 3.34 10*6/MM3 (ref 3.77–5.28)
READ BACK: YES
READ BACK: YES
RESPIRATORY RATE: 18
SAO2 % BLDCOA: 100 % (ref 95–99)
SODIUM BLD-SCNC: 139 MMOL/L (ref 131–143)
SODIUM SERPL-SCNC: 135 MMOL/L (ref 136–145)
SODIUM SERPL-SCNC: 135 MMOL/L (ref 136–145)
TROPONIN T DELTA: 3 NG/L
TROPONIN T SERPL HS-MCNC: 55 NG/L
VENTILATOR MODE: AC
VT ON VENT VENT: 380 ML
WBC NRBC COR # BLD AUTO: 8 10*3/MM3 (ref 3.4–10.8)

## 2024-08-21 PROCEDURE — 0JPT3XZ REMOVAL OF TUNNELED VASCULAR ACCESS DEVICE FROM TRUNK SUBCUTANEOUS TISSUE AND FASCIA, PERCUTANEOUS APPROACH: ICD-10-PCS | Performed by: SURGERY

## 2024-08-21 PROCEDURE — 25010000002 PROPOFOL 10 MG/ML EMULSION

## 2024-08-21 PROCEDURE — 94799 UNLISTED PULMONARY SVC/PX: CPT

## 2024-08-21 PROCEDURE — 25010000002 CEFAZOLIN PER 500 MG: Performed by: SURGERY

## 2024-08-21 PROCEDURE — 25010000002 HYDRALAZINE PER 20 MG: Performed by: SURGERY

## 2024-08-21 PROCEDURE — 82803 BLOOD GASES ANY COMBINATION: CPT

## 2024-08-21 PROCEDURE — 80048 BASIC METABOLIC PNL TOTAL CA: CPT | Performed by: INTERNAL MEDICINE

## 2024-08-21 PROCEDURE — 85610 PROTHROMBIN TIME: CPT | Performed by: SURGERY

## 2024-08-21 PROCEDURE — 25010000002 FUROSEMIDE PER 20 MG: Performed by: INTERNAL MEDICINE

## 2024-08-21 PROCEDURE — 36589 REMOVAL TUNNELED CV CATH: CPT | Performed by: SURGERY

## 2024-08-21 PROCEDURE — 0BH17EZ INSERTION OF ENDOTRACHEAL AIRWAY INTO TRACHEA, VIA NATURAL OR ARTIFICIAL OPENING: ICD-10-PCS | Performed by: INTERNAL MEDICINE

## 2024-08-21 PROCEDURE — 94761 N-INVAS EAR/PLS OXIMETRY MLT: CPT

## 2024-08-21 PROCEDURE — 99223 1ST HOSP IP/OBS HIGH 75: CPT | Performed by: INTERNAL MEDICINE

## 2024-08-21 PROCEDURE — 25010000002 MIDAZOLAM PER 1MG: Performed by: SURGERY

## 2024-08-21 PROCEDURE — 25010000002 FENTANYL CITRATE (PF) 50 MCG/ML SOLUTION: Performed by: SURGERY

## 2024-08-21 PROCEDURE — 85025 COMPLETE CBC W/AUTO DIFF WBC: CPT | Performed by: SURGERY

## 2024-08-21 PROCEDURE — 80047 BASIC METABLC PNL IONIZED CA: CPT

## 2024-08-21 PROCEDURE — 83880 ASSAY OF NATRIURETIC PEPTIDE: CPT | Performed by: INTERNAL MEDICINE

## 2024-08-21 PROCEDURE — 0T9B70Z DRAINAGE OF BLADDER WITH DRAINAGE DEVICE, VIA NATURAL OR ARTIFICIAL OPENING: ICD-10-PCS | Performed by: INTERNAL MEDICINE

## 2024-08-21 PROCEDURE — 02PY33Z REMOVAL OF INFUSION DEVICE FROM GREAT VESSEL, PERCUTANEOUS APPROACH: ICD-10-PCS | Performed by: SURGERY

## 2024-08-21 PROCEDURE — 85730 THROMBOPLASTIN TIME PARTIAL: CPT | Performed by: SURGERY

## 2024-08-21 PROCEDURE — 83605 ASSAY OF LACTIC ACID: CPT

## 2024-08-21 PROCEDURE — 94002 VENT MGMT INPAT INIT DAY: CPT

## 2024-08-21 PROCEDURE — 5A1935Z RESPIRATORY VENTILATION, LESS THAN 24 CONSECUTIVE HOURS: ICD-10-PCS | Performed by: INTERNAL MEDICINE

## 2024-08-21 PROCEDURE — 93005 ELECTROCARDIOGRAM TRACING: CPT | Performed by: INTERNAL MEDICINE

## 2024-08-21 PROCEDURE — 25010000002 HEPARIN (PORCINE) PER 1000 UNITS: Performed by: SURGERY

## 2024-08-21 PROCEDURE — 80048 BASIC METABOLIC PNL TOTAL CA: CPT | Performed by: SURGERY

## 2024-08-21 PROCEDURE — 36600 WITHDRAWAL OF ARTERIAL BLOOD: CPT

## 2024-08-21 PROCEDURE — 5A1D70Z PERFORMANCE OF URINARY FILTRATION, INTERMITTENT, LESS THAN 6 HOURS PER DAY: ICD-10-PCS | Performed by: INTERNAL MEDICINE

## 2024-08-21 PROCEDURE — 82948 REAGENT STRIP/BLOOD GLUCOSE: CPT

## 2024-08-21 PROCEDURE — 71045 X-RAY EXAM CHEST 1 VIEW: CPT

## 2024-08-21 PROCEDURE — 31500 INSERT EMERGENCY AIRWAY: CPT | Performed by: PHYSICIAN ASSISTANT

## 2024-08-21 PROCEDURE — 99291 CRITICAL CARE FIRST HOUR: CPT | Performed by: INTERNAL MEDICINE

## 2024-08-21 PROCEDURE — 84484 ASSAY OF TROPONIN QUANT: CPT | Performed by: INTERNAL MEDICINE

## 2024-08-21 PROCEDURE — S0260 H&P FOR SURGERY: HCPCS | Performed by: SURGERY

## 2024-08-21 RX ORDER — CHLORHEXIDINE GLUCONATE 0.12 MG/ML
15 RINSE ORAL EVERY 12 HOURS SCHEDULED
Status: DISCONTINUED | OUTPATIENT
Start: 2024-08-21 | End: 2024-08-22

## 2024-08-21 RX ORDER — ETOMIDATE 2 MG/ML
INJECTION INTRAVENOUS
Status: COMPLETED | OUTPATIENT
Start: 2024-08-21 | End: 2024-08-21

## 2024-08-21 RX ORDER — FENTANYL CITRATE 50 UG/ML
INJECTION, SOLUTION INTRAMUSCULAR; INTRAVENOUS
Status: DISCONTINUED | OUTPATIENT
Start: 2024-08-21 | End: 2024-08-21 | Stop reason: HOSPADM

## 2024-08-21 RX ORDER — HEPARIN SODIUM 5000 [USP'U]/ML
5000 INJECTION, SOLUTION INTRAVENOUS; SUBCUTANEOUS EVERY 12 HOURS SCHEDULED
Status: DISCONTINUED | OUTPATIENT
Start: 2024-08-21 | End: 2024-08-26 | Stop reason: HOSPADM

## 2024-08-21 RX ORDER — ONDANSETRON 4 MG/1
4 TABLET, FILM COATED ORAL EVERY 8 HOURS PRN
COMMUNITY

## 2024-08-21 RX ORDER — POLYETHYLENE GLYCOL 3350 17 G/17G
17 POWDER, FOR SOLUTION ORAL DAILY PRN
Status: DISCONTINUED | OUTPATIENT
Start: 2024-08-21 | End: 2024-08-26 | Stop reason: HOSPADM

## 2024-08-21 RX ORDER — MIDAZOLAM HYDROCHLORIDE 2 MG/2ML
INJECTION, SOLUTION INTRAMUSCULAR; INTRAVENOUS
Status: DISCONTINUED | OUTPATIENT
Start: 2024-08-21 | End: 2024-08-21 | Stop reason: HOSPADM

## 2024-08-21 RX ORDER — PROPOFOL 10 MG/ML
VIAL (ML) INTRAVENOUS
Status: COMPLETED
Start: 2024-08-21 | End: 2024-08-21

## 2024-08-21 RX ORDER — SODIUM CHLORIDE 0.9 % (FLUSH) 0.9 %
10 SYRINGE (ML) INJECTION AS NEEDED
Status: DISCONTINUED | OUTPATIENT
Start: 2024-08-21 | End: 2024-08-26 | Stop reason: HOSPADM

## 2024-08-21 RX ORDER — LIDOCAINE HYDROCHLORIDE AND EPINEPHRINE 10; 10 MG/ML; UG/ML
INJECTION, SOLUTION INFILTRATION; PERINEURAL
Status: DISCONTINUED | OUTPATIENT
Start: 2024-08-21 | End: 2024-08-21 | Stop reason: HOSPADM

## 2024-08-21 RX ORDER — BISACODYL 10 MG
10 SUPPOSITORY, RECTAL RECTAL DAILY PRN
Status: DISCONTINUED | OUTPATIENT
Start: 2024-08-21 | End: 2024-08-26 | Stop reason: HOSPADM

## 2024-08-21 RX ORDER — HYDRALAZINE HYDROCHLORIDE 20 MG/ML
INJECTION INTRAMUSCULAR; INTRAVENOUS
Status: DISCONTINUED | OUTPATIENT
Start: 2024-08-21 | End: 2024-08-21 | Stop reason: HOSPADM

## 2024-08-21 RX ORDER — SODIUM CHLORIDE 9 MG/ML
40 INJECTION, SOLUTION INTRAVENOUS AS NEEDED
Status: DISCONTINUED | OUTPATIENT
Start: 2024-08-21 | End: 2024-08-26 | Stop reason: HOSPADM

## 2024-08-21 RX ORDER — ONDANSETRON 4 MG/1
4 TABLET, ORALLY DISINTEGRATING ORAL EVERY 6 HOURS PRN
Status: DISCONTINUED | OUTPATIENT
Start: 2024-08-21 | End: 2024-08-26 | Stop reason: HOSPADM

## 2024-08-21 RX ORDER — FAMOTIDINE 10 MG/ML
20 INJECTION, SOLUTION INTRAVENOUS DAILY
Status: DISCONTINUED | OUTPATIENT
Start: 2024-08-21 | End: 2024-08-22

## 2024-08-21 RX ORDER — FUROSEMIDE 10 MG/ML
100 INJECTION INTRAMUSCULAR; INTRAVENOUS ONCE
Status: COMPLETED | OUTPATIENT
Start: 2024-08-21 | End: 2024-08-21

## 2024-08-21 RX ORDER — AMOXICILLIN 250 MG
2 CAPSULE ORAL 2 TIMES DAILY PRN
Status: DISCONTINUED | OUTPATIENT
Start: 2024-08-21 | End: 2024-08-26 | Stop reason: HOSPADM

## 2024-08-21 RX ORDER — HYDRALAZINE HYDROCHLORIDE 20 MG/ML
10 INJECTION INTRAMUSCULAR; INTRAVENOUS ONCE
Status: DISCONTINUED | OUTPATIENT
Start: 2024-08-21 | End: 2024-08-26 | Stop reason: HOSPADM

## 2024-08-21 RX ORDER — NITROGLYCERIN 0.4 MG/1
0.4 TABLET SUBLINGUAL
Status: DISCONTINUED | OUTPATIENT
Start: 2024-08-21 | End: 2024-08-26 | Stop reason: HOSPADM

## 2024-08-21 RX ORDER — BISACODYL 5 MG/1
5 TABLET, DELAYED RELEASE ORAL DAILY PRN
Status: DISCONTINUED | OUTPATIENT
Start: 2024-08-21 | End: 2024-08-26 | Stop reason: HOSPADM

## 2024-08-21 RX ORDER — FENTANYL CITRATE-0.9 % NACL/PF 10 MCG/ML
50-300 PLASTIC BAG, INJECTION (ML) INTRAVENOUS
Status: DISCONTINUED | OUTPATIENT
Start: 2024-08-21 | End: 2024-08-22

## 2024-08-21 RX ORDER — SODIUM CHLORIDE 0.9 % (FLUSH) 0.9 %
10 SYRINGE (ML) INJECTION EVERY 12 HOURS SCHEDULED
Status: DISCONTINUED | OUTPATIENT
Start: 2024-08-21 | End: 2024-08-26 | Stop reason: HOSPADM

## 2024-08-21 RX ORDER — CALCIUM CARBONATE 500 MG/1
1 TABLET, CHEWABLE ORAL 2 TIMES DAILY PRN
Status: DISCONTINUED | OUTPATIENT
Start: 2024-08-21 | End: 2024-08-26 | Stop reason: HOSPADM

## 2024-08-21 RX ORDER — NITROGLYCERIN 0.4 MG/1
0.4 TABLET SUBLINGUAL
Status: DISCONTINUED | OUTPATIENT
Start: 2024-08-21 | End: 2024-08-21

## 2024-08-21 RX ADMIN — HEPARIN SODIUM 5000 UNITS: 5000 INJECTION INTRAVENOUS; SUBCUTANEOUS at 20:44

## 2024-08-21 RX ADMIN — FUROSEMIDE 100 MG: 10 INJECTION, SOLUTION INTRAMUSCULAR; INTRAVENOUS at 16:56

## 2024-08-21 RX ADMIN — SODIUM CHLORIDE 2 G: 9 INJECTION, SOLUTION INTRAVENOUS at 14:10

## 2024-08-21 RX ADMIN — Medication 10 ML: at 20:44

## 2024-08-21 RX ADMIN — Medication 50 MCG/HR: at 16:56

## 2024-08-21 RX ADMIN — PROPOFOL 10 MCG/KG/MIN: 10 INJECTION, EMULSION INTRAVENOUS at 15:10

## 2024-08-21 RX ADMIN — ETOMIDATE 20 MG: 40 INJECTION, SOLUTION INTRAVENOUS at 14:46

## 2024-08-21 RX ADMIN — CHLORHEXIDINE GLUCONATE 15 ML: 1.2 RINSE ORAL at 20:44

## 2024-08-21 RX ADMIN — FAMOTIDINE 20 MG: 10 INJECTION INTRAVENOUS at 16:56

## 2024-08-21 NOTE — PROCEDURES
Procedure Name: Intubation     Indication: Respiratory failure,     Consent obtained: performed emergently    Timeout: Preformed with nursing staff prior to beginning procedure.     Medications used:  Pre-medications: Etomidate 20 mg IV  Paralytics: Rocuronium none    Procedure: Patient was placed in the supine position. Patient preoxygenated with 100% O2 via BMV. RSI was initiated.  Glidescope was then used to visualize the vocal cords as 8.0 ET tube was placed in between the cords without difficulty.  Position was verified by auscultation, condensate noted in ET tube and with Etco2 monitoring. Device was secured at 23 cm at lip. Patient connected to the vent.     This x-ray was ordered to correct tube placement.    The patient tolerated the procedure well.    Complications: None    Electronically signed by ASTER Nelson, 08/21/24, 5:14 PM EDT.

## 2024-08-21 NOTE — DISCHARGE INSTRUCTIONS
DISCHARGE INSTRUCTIONS  SURGICAL / AMBULATORY  PROCEDURES      For your surgery you had:    IV sedation.     You may experience dizziness, drowsiness, or light-headedness for several hours following surgery/procedure.  Do not stay alone today or tonight.  Limit your activity for 24 hours.  Resume your diet slowly.  Follow whatever special dietary instructions you may have been given by your doctor.  You should not drive or operate machinery, drink alcohol, or sign legally binding documents for 24 hours or while you are taking pain medication.    NOTIFY YOUR DOCTOR IF YOU EXPERIENCE ANY OF THE FOLLOWING:  Temperature greater than 101 degrees Fahrenheit  Shaking Chills  Redness or excessive drainage from incision  Nausea, vomiting and/or pain that is not controlled by prescribed medications  Increase in bleeding or bleeding that is excessive  Unable to urinate in 6 hours after surgery  If unable to reach your doctor, please go to the closest Emergency Room  You may begin dressing changes:     [] in 24 hours   [] in 48 hours   [] Other:    Skin adhesive flakes off in 10-14 days.  You may shower tomorrow, no submerging of incision for 2 weeks.  Apply an ice pack 24-48 hours.    Medications per physician instructions as indicated on After Visit Summary.    Last dose of pain medication was given at:   .    SPECIAL INSTRUCTIONS:

## 2024-08-21 NOTE — H&P
[facility]   HISTORY AND PHYSICAL    Patient Name: Charlette Rai  : 1935  MRN: [id number]  Primary Care Physician:  Brennan Mosqueda MD  Date of admission: 2024    Subjective   Subjective     Chief Complaint: End-stage renal disease with need for TDC removal who has functional long-term access    HPI:    Charlette Rai is a 89 y.o. female with end-stage renal disease who ha had been using right IJ tunneled dialysis catheter while her left upper extremity axilloaxillary AV loop graft was being optimized after pulling clots for short period of time.  She states that she had a procedure performed on this and it is been functioning well without complications or difficulties on dialysis for the past 2 weeks as such dialysis center contacted vascular surgery for removal of the right IJ tunneled dialysis catheter.  She has a history of migraines and dizziness and is somewhat dizzy today and overall states she is just not feeling well.  She is scheduled to go to dialysis directly after this TDC removal.    Review of Systems    Non contributory except for the History of Present Illness    Personal History     Past Medical History:   Diagnosis Date    Allergic rhinitis     Anaphylactic shock, unspecified, initial encounter 2023    Anemia     Arthritis     Asthma     USE INHALERS AND NEBULIZERS    Back pain     Bladder disorder     Cancer     LEFT LUNG CANCER  SURGERY AND CHEMO DONE  AND CURRENTLY   RIGHT LUNG CANCER HAS ONLY RECEIVED RADIATION THUS FAR    Chronic kidney disease     stage 3    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 DENIES CP BUT DOES HAVE SOA CHRONIC ISSUE COPD/LUNG CANCER    COPD (chronic obstructive pulmonary disease)     FOLLOWED BY DR MARIAJOSE BAILEY    Coronary artery disease     DENIES CP BUT DOES  GET SOA MOST OF THE TIME WITH EXERTION BUT OCC AT REST CHRONIC ISSUE COPD/LUNG CANCER    Deep vein thrombosis     Diabetes mellitus     DOES NOT CHECK BS DAILY    Disease of thyroid gland     HYPOTHYROIDISM    Essential hypertension     Gastric ulcer     GERD (gastroesophageal reflux disease)     Heart murmur     History of transfusion     NO ISSUES POST TRANSFUSION WAS MANY YEARS AGO    Hyperlipemia     Leukocytopenia     FOLLOWED BY DR MIR BAILEY    Limb swelling     Lumbago     Lumbar spinal stenosis     Lung cancer     Migraine headache     Multiple joint pain     On home oxygen therapy     3L/NC PRN    Osteopenia     PNA (pneumonia) 05/04/2024    Pseudomonas pneumonia 04/29/2024    Reflux esophagitis     Shortness of breath     Thyroid nodule     HAS ULTRASOUND YEARLY BEING MONITORED    Vascular disease     Vitamin D deficiency        Past Surgical History:   Procedure Laterality Date    ABDOMINAL SURGERY      ANGIOPLASTY RENAL ARTERY Left 06/14/2024    stent placement    APPENDECTOMY      ARTERIOGRAM N/A 6/14/2024    Procedure: Arteriogram, right radial access, aortogram and renal arteriogram with possible intervention.;  Surgeon: Dio Miguel MD;  Location: [facility] CATH INVASIVE LOCATION;  Service: Peripheral Vascular;  Laterality: N/A;    ARTERIOVENOUS FISTULA/SHUNT SURGERY Left 05/10/2022    Procedure: Left basilic vein transposition;  Surgeon: Wan Barksdale MD;  Location: [facility] MAIN OR;  Service: Vascular;  Laterality: Left;    ARTERIOVENOUS FISTULA/SHUNT SURGERY Left 03/23/2023    Procedure: Ligation of left arm arteriovenous fistula;  Surgeon: Wan Barksdale MD;  Location: [facility] MAIN OR;  Service: Vascular;  Laterality: Left;    ARTERIOVENOUS FISTULA/SHUNT SURGERY Left 11/30/2023    Procedure: Creation of left arm arteriovenous graft;  Surgeon: Wan Barksdale MD;  Location: [facility] MAIN OR;  Service: Vascular;  Laterality: Left;    BRONCHOSCOPY N/A 04/24/2024    Procedure: BRONCHOSCOPY WITH BAL AND  WASHINGS;  Surgeon: Khalif Yanez MD;  Location: [facility] ENDOSCOPY;  Service: Pulmonary;  Laterality: N/A;  MUCUS PLUGGING    CARDIAC CATHETERIZATION  1996    CARDIAC SURGERY      CARDIAC SURGERY      fluid drained from heart    CATARACT EXTRACTION, BILATERAL  2003    COLONOSCOPY  2014    ENDOSCOPY  2016 2019    FEMORAL ARTERY STENT Bilateral     HYSTERECTOMY      LUNG BIOPSY Left 2005    lobectomy upper lung caner    LUNG VOLUME REDUCTION      OTHER SURGICAL HISTORY      artifical joints/limbs    REPLACEMENT TOTAL KNEE Left 2016    UPPER GASTROINTESTINAL ENDOSCOPY         Family History: family history includes Arthritis in her father and mother; Cancer in her brother, brother, brother, and brother; Diabetes in her brother; Other in her brother; Prostate cancer in her brother and brother. Otherwise pertinent FHx was reviewed and not pertinent to current issue.    Social History:  reports that she quit smoking about 20 years ago. Her smoking use included cigarettes. She started smoking about 72 years ago. She has a 52.5 pack-year smoking history. She has been exposed to tobacco smoke. She has never used smokeless tobacco. She reports that she does not drink alcohol and does not use drugs.    Home Medications:  No current facility-administered medications on file prior to encounter.     Current Outpatient Medications on File Prior to Encounter   Medication Sig    acetaminophen (TYLENOL) 325 MG tablet Take 2 tablets by mouth Every 6 (Six) Hours As Needed for Mild Pain.    albuterol sulfate  (90 Base) MCG/ACT inhaler Inhale 2 puffs Every 4 (Four) Hours As Needed for Wheezing.    amLODIPine (NORVASC) 2.5 MG tablet Take 1 tablet by mouth As Needed (Once daily for a systolic blood pressure greater than 150). For a systolic blood pressure greater than 150    aspirin 81 MG EC tablet Take 1 tablet by mouth Daily.    budesonide (Pulmicort) 0.5 MG/2ML nebulizer solution Take 2 mL by nebulization Daily.    carvedilol  (COREG) 12.5 MG tablet Take 1 tablet by mouth 2 (Two) Times a Day With Meals for 30 days.    dexlansoprazole (DEXILANT) 60 MG capsule Take 1 capsule by mouth Daily.    furosemide (LASIX) 20 MG tablet Take 1 tablet by mouth Daily.    isosorbide mononitrate (IMDUR) 30 MG 24 hr tablet Take 1 tablet by mouth Daily.    levocetirizine (XYZAL) 5 MG tablet TAKE 1 TABLET DAILY (Patient taking differently: Take 1 tablet by mouth Every Evening.)    levothyroxine (Synthroid) 50 MCG tablet Take 0.5 tablets by mouth Daily.    linagliptin (Tradjenta) 5 MG tablet tablet Take 1 tablet by mouth Daily.    losartan (Cozaar) 50 MG tablet Take 1 tablet by mouth 2 (Two) Times a Day.    Methylcellulose, Laxative, (FIBER THERAPY PO) Take 2 tablets by mouth Daily.    ondansetron (ZOFRAN) 4 MG tablet Take 1 tablet by mouth Every 8 (Eight) Hours As Needed for Nausea or Vomiting.    rosuvastatin (CRESTOR) 20 MG tablet Take 1 tablet by mouth Daily.    saccharomyces boulardii (FLORASTOR) 250 MG capsule Take 1 capsule by mouth 2 (Two) Times a Day.    sevelamer (RENVELA) 800 MG tablet Take 1 tablet by mouth 3 (Three) Times a Day With Meals.    spironolactone (ALDACTONE) 25 MG tablet Take 1 tablet by mouth Every Other Day.    vitamin D (ERGOCALCIFEROL) 1.25 MG (98239 UT) capsule capsule Take 1 capsule by mouth Every 14 (Fourteen) Days.    clopidogrel (Plavix) 75 MG tablet Take 1 tablet by mouth Daily.    ipratropium-albuterol (DUO-NEB) 0.5-2.5 mg/3 ml nebulizer Take 3 mL by nebulization Every 4 (Four) Hours As Needed for Shortness of Air or Wheezing for up to 30 days.    nitroglycerin (NITROSTAT) 0.4 MG SL tablet Place 1 tablet under the tongue Every 5 (Five) Minutes As Needed for Chest Pain.    tobramycin (JUAN) Take 5 mL by nebulization 2 (Two) Times a Day.          Allergies:  No Known Allergies    Objective   Objective     Vitals:   Temp:  [97.6 °F (36.4 °C)] 97.6 °F (36.4 °C)  Heart Rate:  [87] 87  Resp:  [20] 20  BP: (120-198)/(55-98)  120/98    Physical Exam    General: Awake, alert, NAD   Eyes:  SANTIAGO, EOMI, Sclera non-icteric   Neck: Supple, no LAD or carotid bruits present   Lungs: Clear to auscultation B, right IJ tunneled dialysis catheter in place without any evidence of erythema or purulent drainage.   Heart: RRR   Abdomen: Soft, nontender, nondistended with positive bowel sounds   Ext: No clubbing cyanosis or edema.  Left upper extremity AV graft with positive thrill and bruit.   Neuro: CN's II-XII grossly intact.  No focal or lateralizing deficits present currently.       CBC:      Lab 08/21/24  1152   WBC 8.00   HEMOGLOBIN 10.1*   HEMATOCRIT 34.0   PLATELETS 154   NEUTROS ABS 6.57   IMMATURE GRANS (ABS) 0.03   LYMPHS ABS 0.60*   MONOS ABS 0.71   EOS ABS 0.06   .8*        CMP:        Lab 08/21/24  1152   SODIUM 135*   POTASSIUM 5.0   CHLORIDE 94*   CO2 34.3*   ANION GAP 6.7   BUN 23   CREATININE 3.18*   EGFR 13.6*   GLUCOSE 128*   CALCIUM 8.4*        Diagnostic studies:   N/A    Assessment & Plan   Assessment / Plan     Active Hospital Problems:  Active Hospital Problems    Diagnosis     **ESRD (end stage renal disease) on dialysis        Diagnoses and all orders for this visit:    1. ESRD (end stage renal disease) on dialysis  -     Invasive peripheral vascular study; Standing  -     Invasive peripheral vascular study  -     ceFAZolin 2000 mg IVPB in 100 mL NS (VTB)    Other orders  -     CBC & Differential; Standing  -     Basic Metabolic Panel; Standing  -     aPTT; Standing  -     Protime-INR; Standing  -     CBC & Differential  -     Basic Metabolic Panel  -     aPTT  -     Protime-INR  -     Follow Anesthesia Guidelines / Protocol; Standing  -     Obtain Informed Consent; Standing  -     Follow Anesthesia Guidelines / Protocol  -     Obtain Informed Consent  -     hydrALAZINE (APRESOLINE) injection 10 mg        Assessment/plan:   Patient is an 89-year-old  female with end-stage renal disease and right IJ tunneled  dialysis catheter and functional left upper extremity axilloaxillary loop AV graft which she has been using for the past 2 to 3 weeks without difficulty or complication.  2.  She presents to the hospital today for TDC removal.  3.  Risks including but not limited to bleeding, infection, need for multiple procedures, cardiac and respiratory compromise and death as well as benefits and indications regarding tunneled dialysis catheter removal were discussed with patient as well as  and daughter at bedside who voiced understanding and willingness to proceed.  All questions were answered.  4.  Past medical history significant for GERD, anemia, hypertension, migraines, hypothyroidism, remote history of DVT, COPD on 3 L of oxygen as needed, diabetes mellitus, CAD, and CHF.  5.  The patient is to present for dialysis directly after the TDC removal.      Electronically signed by Jaden Castañeda MD, 08/21/24, 1:44 PM EDT.

## 2024-08-21 NOTE — NURSING NOTE
"Pt was transported from cath lab 2 following removal of a TDC @1423 in stable condition on 3 LPM by this RN & Jose Raul RN. Pt was A&O x4 talking to this RN upon exiting the lab. (Pt stated to this RN before transport to the cath lab that she is on 3lpm all the time.) During transport back to \[facility]\"", pt stated to this RN and Jose Raul RN \"I need a breathing tx\" This RN replied to the pt that I would contact Dr. Castañeda to get an order for a breathing tx. Jose Raul RN took pt to B4 with SDS RN to give beside report. This RN went to the nurses desk to contact Dr. Castañeda about an order for a breathing tx. Dr. Castañeda gave a verbal order to this RN for a breathing tx. This RN went to the computer to put the order in. Jose Raul RN exited the pt room. 30 seconds following this, SDS nurse came to nurses desk asking for help. This RN located the code cart, grabbed the ambu bag & came into the pt room. This RN observed the pt to be gray in color taking gasping breaths. This RN immediately started to bag the pt. \[facility]\"" RN stated 02 sat. Was 28%. While bagging pt \[facility]\"" called a code on this pt. Pt 02 sat came up to 100% before the code team arrived. Pt never lost a pulse or stopped breathing.   "

## 2024-08-21 NOTE — NURSING NOTE
1430 Patient transported to Same day surgery Unit. With Deandre KHAN on 4 liters NC. Patient in Elevator conversing and asked for a nebulizer treatment. Patient in room returned to 4 liter NC on wall. Patient AAO x 4 KATE Respirations regular. Bedside report to Day Surgery Nurse at bedside with vital sign machine. Deandre Leal RN at nursing desk contacting Dr. Csatañeda for Nebulizer request.

## 2024-08-21 NOTE — POST-PROCEDURE NOTE
Patient tolerated removal of her right IJ tunneled dialysis catheter in the Cath Lab today.  She states that she has not felt good today and today is one of her dialysis days.  Upon arrival to the same-day surgery unit the patient became hypoxic with O2 sat of 28% CODE BLUE was called and the patient was then bagged with O2 sat then up to 99% she was stabilized and did have a pulse the entire time.  Ultimately she was intubated and plan will be for be admitted per the medicine team to the ICU.  Her nephrologist Dr. García has been updated by Dr. Holcomb the intensivist in order to initiate dialysis via her left upper extremity axilloaxillary AV graft.  The family including her  and daughter have been updated as to the patient's status and assessment and plan.

## 2024-08-21 NOTE — CONSULTS
[facility]   Nephrology Consult Note      Patient Name: Charlette Rai  : 1935  MRN: [id number]  Primary Care Physician:  Brennan Mosqueda MD  Referring Physician: Jaden Castañeda MD  Date of admission: 2024    Subjective   Subjective     Reason for Consult/ Chief Complaint: ESRD with acute respiratory failure.    HPI:  Charlette Rai is a 89 y.o. female with history of peripheral arterial disease, hypertension and diabetes in addition to end-stage renal disease who was brought in electively to have right IJ TDC removal.  She has been using left upper extremity AV graft without problems.  Last dialysis was Monday, normally on MWF schedule at [facility] and [place].  Postop, she was found to be severely hypoxic and was intubated and admitted to ICU.  She was hypertensive, chest x-ray showed evidence of pulmonary edema.    Review of Systems   Review of systems could not be obtained due to   patient intubated.    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 DENIES CP BUT DOES HAVE SOA CHRONIC ISSUE COPD/LUNG CANCER    COPD (chronic obstructive pulmonary disease)     FOLLOWED BY DR MARIAJOSE BAILEY    Coronary artery disease     DENIES CP BUT DOES GET SOA MOST OF THE TIME WITH EXERTION BUT OCC AT REST CHRONIC ISSUE COPD/LUNG CANCER    Deep vein thrombosis     Diabetes mellitus     DOES NOT CHECK BS DAILY     Disease of thyroid gland     HYPOTHYROIDISM    Essential hypertension     Gastric ulcer     GERD (gastroesophageal reflux disease)     Heart murmur     History of transfusion     NO ISSUES POST TRANSFUSION WAS MANY YEARS AGO    Hyperlipemia     Leukocytopenia     FOLLOWED BY DR MIR BAILEY    Limb swelling     Lumbago     Lumbar spinal stenosis     Lung cancer     Migraine headache     Multiple joint pain     On home oxygen therapy     3L/NC PRN    Osteopenia     PNA (pneumonia) 05/04/2024    Pseudomonas pneumonia 04/29/2024    Reflux esophagitis     Shortness of breath     Thyroid nodule     HAS ULTRASOUND YEARLY BEING MONITORED    Vascular disease     Vitamin D deficiency        Past Surgical History:   Procedure Laterality Date    ABDOMINAL SURGERY      ANGIOPLASTY RENAL ARTERY Left 06/14/2024    stent placement    APPENDECTOMY      ARTERIOGRAM N/A 6/14/2024    Procedure: Arteriogram, right radial access, aortogram and renal arteriogram with possible intervention.;  Surgeon: Dio Miguel MD;  Location: [facility] CATH INVASIVE LOCATION;  Service: Peripheral Vascular;  Laterality: N/A;    ARTERIOVENOUS FISTULA/SHUNT SURGERY Left 05/10/2022    Procedure: Left basilic vein transposition;  Surgeon: Wan Barksdale MD;  Location: [facility] MAIN OR;  Service: Vascular;  Laterality: Left;    ARTERIOVENOUS FISTULA/SHUNT SURGERY Left 03/23/2023    Procedure: Ligation of left arm arteriovenous fistula;  Surgeon: Wan Barksdale MD;  Location: [facility] MAIN OR;  Service: Vascular;  Laterality: Left;    ARTERIOVENOUS FISTULA/SHUNT SURGERY Left 11/30/2023    Procedure: Creation of left arm arteriovenous graft;  Surgeon: Wan Barksdale MD;  Location: [facility] MAIN OR;  Service: Vascular;  Laterality: Left;    BRONCHOSCOPY N/A 04/24/2024    Procedure: BRONCHOSCOPY WITH BAL AND WASHINGS;  Surgeon: Khalif Yanez MD;  Location: [facility] ENDOSCOPY;  Service: Pulmonary;  Laterality: N/A;  MUCUS PLUGGING    CARDIAC CATHETERIZATION  1996     CARDIAC SURGERY      CARDIAC SURGERY      fluid drained from heart    CATARACT EXTRACTION, BILATERAL  2003    COLONOSCOPY  2014    ENDOSCOPY  2016 2019    FEMORAL ARTERY STENT Bilateral     HYSTERECTOMY      LUNG BIOPSY Left 2005    lobectomy upper lung caner    LUNG VOLUME REDUCTION      OTHER SURGICAL HISTORY      artifical joints/limbs    REPLACEMENT TOTAL KNEE Left 2016    UPPER GASTROINTESTINAL ENDOSCOPY         Family History: family history includes Arthritis in her father and mother; Cancer in her brother, brother, brother, and brother; Diabetes in her brother; Other in her brother; Prostate cancer in her brother and brother. Otherwise pertinent FHx was reviewed and not pertinent to current issue.    Social History:  reports that she quit smoking about 20 years ago. Her smoking use included cigarettes. She started smoking about 72 years ago. She has a 52.5 pack-year smoking history. She has been exposed to tobacco smoke. She has never used smokeless tobacco. She reports that she does not drink alcohol and does not use drugs.    Home Medications:  Methylcellulose (Laxative), acetaminophen, albuterol sulfate HFA, amLODIPine, aspirin, budesonide, carvedilol, clopidogrel, dexlansoprazole, furosemide, ipratropium-albuterol, isosorbide mononitrate, levocetirizine, levothyroxine, linagliptin, losartan, nitroglycerin, ondansetron, rosuvastatin, saccharomyces boulardii, sevelamer, spironolactone, tobramycin, and vitamin D    Allergies:  No Known Allergies    Objective    Objective     Vitals:   Temp:  [97.6 °F (36.4 °C)-98.5 °F (36.9 °C)] 98.5 °F (36.9 °C)  Heart Rate:  [87-89] 89  Resp:  [20-22] 22  BP: (120-198)/(43-98) 158/43  Flow (L/min):  [4] 4     Physical Exam    Constitutional: Awake, alert, following simple commands.  Mildly hard of hearing   Eyes: sclerae anicteric, no conjunctival injection   HENT: Orally intubated   Neck: Supple, no thyromegaly, no lymphadenopathy, trachea midline, mild   JVD   Respiratory: Decreased breath sounds with bilateral Rales, upper respiratory noise present   Cardiovascular: RRR, no murmurs, rubs, or gallops.   Gastrointestinal: Positive bowel sounds, soft, nontender, nondistended   Musculoskeletal: Trace edema, no clubbing or cyanosis, left upper extremity AV graft, patent   Psychiatric: cooperative   Neurologic: moving all extremities, Cranial Nerves grossly intact   Skin: warm and dry, no rashes    Right anterior chest dressing over site of previous TDC.    Result Review    Result Reviewed:  I have personally reviewed the results from the time of this admission to 8/21/2024 15:52 EDT and agree with these findings:  [x]  Laboratory  []  Microbiology  [x]  Radiology  []  EKG/Telemetry   []  Cardiology/Vascular   []  Pathology  [x]  Old records  []  Other:  LAB RESULTS:    LAB RESULTS:        Lab 08/21/24  1519 08/21/24  1516 08/21/24  1152   SODIUM  --  135* 135*   POTASSIUM  --  5.5* 5.0   CHLORIDE  --  96* 94*   CO2  --  31.7* 34.3*   BUN  --  23 23   CREATININE 3.45* 3.46* 3.18*   GLUCOSE  --  154* 128*   EGFR  --  12.3* 13.6*   ANION GAP  --  7.3 6.7           Most notable findings include: Labs reviewed as above.    Assessment & Plan   Assessment / Plan     Brief Patient Summary:  Charlette Rai is a 89 y.o. female who is admitted to ICU after respiratory arrest in postop after TDC removal.    Active Hospital Problems:  Active Hospital Problems    Diagnosis     **ESRD (end stage renal disease) on dialysis     Hypoxia        Assessment and Plan:   - ESRD with pulmonary edema, severe hypertension and mild hyperkalemia, stat dialysis ordered, discussed with dialysis staff.  Will attempt at 4 L fluid removal.  Last dialysis was Monday.  Using left upper extremity AV graft.  TDC removed.  - Acute respiratory acidosis, possibly related to sedatives from earlier procedure.  Now intubated in ICU, pulmonary following.  - Mild hyperkalemia, potassium 5.5, dialysis as above.   Lasix 100 mg IV push x 1  - Severe hypertension in the setting of acute pulmonary edema, she has prior history of renal artery stenosis, status post stenting.  Blood pressure should improve with ultrafiltration.  Resume blood pressure medications once able to take.  - Anemia of CKD.  On long-acting CASSANDRA as outpatient.  - Hypothyroidism, per primary.  Discussed with ICU team.  Will follow.    Thank you very much for this consult!    Electronically signed by Edi García MD, 8/21/2024, 15:52 EDT.

## 2024-08-21 NOTE — CONSULTS
[facility]   Consult Note    Patient Name: Charlette Rai  : 1935  MRN: [id number]  Primary Care Physician:  Brennan Mosqueda MD  Referring Physician: Jaden Castañeda MD  Date of admission: 2024    Inpatient Pulmonology Consult  Consult performed by: Regis Holcomb DO  Consult ordered by: Miguel Dennison MD        Subjective   Subjective     Reason for Consult/ Chief Complaint: Reason for consultation critical care management    History of Present Illness  Charlette Rai is a 89 y.o. female critically ill in the ICU  CODE JENNIFER was called after patient had removal of tunneled dialysis catheter  On arrival to the patient's room she was somnolent and near agonal he breathing  Oxygen saturations were in the 70s  Systolic blood pressure greater than 200    Review of Systems   Unable to give history  Personal History     Past Medical History:   Diagnosis Date    Allergic rhinitis     Anaphylactic shock, unspecified, initial encounter 2023    Anemia     Arthritis     Asthma     USE INHALERS AND NEBULIZERS    Back pain     Bladder disorder     Cancer     LEFT LUNG CANCER  SURGERY AND CHEMO DONE  AND CURRENTLY   RIGHT LUNG CANCER HAS ONLY RECEIVED RADIATION THUS FAR    Chronic kidney disease     stage 3    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 DENIES CP BUT DOES HAVE SOA CHRONIC ISSUE COPD/LUNG CANCER    COPD (chronic obstructive pulmonary disease)     FOLLOWED BY DR MARIAJOSE BAILEY    Coronary artery disease     DENIES CP BUT DOES GET SOA MOST OF THE TIME WITH EXERTION BUT OCC AT REST CHRONIC ISSUE COPD/LUNG CANCER    Deep vein thrombosis     Diabetes mellitus     DOES NOT CHECK BS DAILY    Disease of thyroid gland     HYPOTHYROIDISM    Essential hypertension     Gastric ulcer     GERD (gastroesophageal reflux  disease)     Heart murmur     History of transfusion     NO ISSUES POST TRANSFUSION WAS MANY YEARS AGO    Hyperlipemia     Leukocytopenia     FOLLOWED BY DR MIR BAILEY    Limb swelling     Lumbago     Lumbar spinal stenosis     Lung cancer     Migraine headache     Multiple joint pain     On home oxygen therapy     3L/NC PRN    Osteopenia     PNA (pneumonia) 05/04/2024    Pseudomonas pneumonia 04/29/2024    Reflux esophagitis     Shortness of breath     Thyroid nodule     HAS ULTRASOUND YEARLY BEING MONITORED    Vascular disease     Vitamin D deficiency        Past Surgical History:   Procedure Laterality Date    ABDOMINAL SURGERY      ANGIOPLASTY RENAL ARTERY Left 06/14/2024    stent placement    APPENDECTOMY      ARTERIOGRAM N/A 6/14/2024    Procedure: Arteriogram, right radial access, aortogram and renal arteriogram with possible intervention.;  Surgeon: Dio Miguel MD;  Location: [facility] CATH INVASIVE LOCATION;  Service: Peripheral Vascular;  Laterality: N/A;    ARTERIOVENOUS FISTULA/SHUNT SURGERY Left 05/10/2022    Procedure: Left basilic vein transposition;  Surgeon: Wan Barksdale MD;  Location: [facility] MAIN OR;  Service: Vascular;  Laterality: Left;    ARTERIOVENOUS FISTULA/SHUNT SURGERY Left 03/23/2023    Procedure: Ligation of left arm arteriovenous fistula;  Surgeon: Wan Barksdale MD;  Location: [facility] MAIN OR;  Service: Vascular;  Laterality: Left;    ARTERIOVENOUS FISTULA/SHUNT SURGERY Left 11/30/2023    Procedure: Creation of left arm arteriovenous graft;  Surgeon: Wan Barksdale MD;  Location: [facility] MAIN OR;  Service: Vascular;  Laterality: Left;    BRONCHOSCOPY N/A 04/24/2024    Procedure: BRONCHOSCOPY WITH BAL AND WASHINGS;  Surgeon: Khalif Yanez MD;  Location: [facility] ENDOSCOPY;  Service: Pulmonary;  Laterality: N/A;  MUCUS PLUGGING    CARDIAC CATHETERIZATION  1996    CARDIAC SURGERY      CARDIAC SURGERY      fluid drained from heart    CATARACT EXTRACTION, BILATERAL  2003    COLONOSCOPY  2014     ENDOSCOPY  2016 2019    FEMORAL ARTERY STENT Bilateral     HYSTERECTOMY      LUNG BIOPSY Left 2005    lobectomy upper lung caner    LUNG VOLUME REDUCTION      OTHER SURGICAL HISTORY      artifical joints/limbs    REPLACEMENT TOTAL KNEE Left 2016    UPPER GASTROINTESTINAL ENDOSCOPY         Family History: family history includes Arthritis in her father and mother; Cancer in her brother, brother, brother, and brother; Diabetes in her brother; Other in her brother; Prostate cancer in her brother and brother. Otherwise pertinent FHx was reviewed and not pertinent to current issue.    Social History:  reports that she quit smoking about 20 years ago. Her smoking use included cigarettes. She started smoking about 72 years ago. She has a 52.5 pack-year smoking history. She has been exposed to tobacco smoke. She has never used smokeless tobacco. She reports that she does not drink alcohol and does not use drugs.    Home Medications:   Methylcellulose (Laxative), acetaminophen, albuterol sulfate HFA, amLODIPine, aspirin, budesonide, carvedilol, clopidogrel, dexlansoprazole, furosemide, ipratropium-albuterol, isosorbide mononitrate, levocetirizine, levothyroxine, linagliptin, losartan, nitroglycerin, ondansetron, rosuvastatin, saccharomyces boulardii, sevelamer, spironolactone, tobramycin, and vitamin D    Allergies:  No Known Allergies    Objective    Objective     Vitals:  Temp:  [97.6 °F (36.4 °C)-98.5 °F (36.9 °C)] 98.5 °F (36.9 °C)  Heart Rate:  [87-90] 90  Resp:  [20-24] 24  BP: (120-198)/(43-98) 158/43  Flow (L/min):  [4] 4  FiO2 (%):  [40 %] 40 %    Physical Exam  Chronically ill-appearing elderly female  At the time my exam when I saw her after the code was being called she was agonal he breathing and not responsive  Result Review    Result Review:  I have personally reviewed the results from the time of this admission to 8/21/2024 16:27 EDT and agree with these findings:  [x]  Laboratory  [x]   Microbiology  [x]  Radiology  [x]  EKG/Telemetry   [x]  Cardiology/Vascular   []  Pathology  []  Old records  []  Other:    Most notable findings include: X-ray showed significant pulmonary edema    Assessment & Plan   Assessment / Plan     Brief Patient Summary:  Charlette Rai is a 89 y.o. 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     Hypoxia    Acute pulmonary edema  Acute on chronic hypoxic and hypercapnic respiratory failure  Hypertensive emergency with systolic blood pressures greater than 200-just preceding intubation blood pressure was 206/98  Hyperkalemia    Plan:   Patient intubated emergently due to respiratory failure  Patient on assist-control tidal volume of 380, respiratory rate of 18, PEEP of 5  Nephrology consulted  To receive dialysis today  Can assess patient after hemodialysis to see about extubation  Propofol and fentanyl for sedation  No antibiotics needed at this time    Patient critically ill in the ICU with respiratory failure    Total critical care time spent 32 minutes      Electronically signed by Regis Holcomb DO, 08/21/24, 4:27 PM EDT.

## 2024-08-21 NOTE — THERAPY EVALUATION
RT EQUIPMENT DEVICE RELATED - SKIN ASSESSMENT    RT Medical Equipment/Device:     ETT Garcia/[employer]    Skin Assessment:      Head/neck/face:  Intact    Device Skin Pressure Protection:  Pressure points protected    Nurse Notification:  Gladis Matthews, RRT

## 2024-08-21 NOTE — H&P
[facility]   HOSPITALIST HISTORY AND PHYSICAL  Date: 2024   Patient Name: Charlette Rai  : 1935  MRN: [id number]  Primary Care Physician:  Brennan Mosqueda MD  Date of admission: 2024    Subjective   Subjective     Chief Complaint:   Consulted by vascular surgery, hypoxia following patient's tunneled dialysis catheter removal today    HPI:    Charlette Rai is an 89 y.o. female with medical history of GERD, end-stage renal disease, coronary artery disease, peripheral vascular disease, hypertension, heart failure with preserved ejection fraction, renal artery stenosis, hypothyroidism, COPD chronically on 3 L nasal cannula    The patient presented to the hospital on 2024 for an elective tunneled dialysis catheter removal.  Patient with end-stage renal disease, had been using a right IJ tunneled dialysis catheter while her left upper extremity axial axilla AV loop graft was being optimized after pulling clots for short period of time.  Patient presented for removal of tunneled dialysis catheter, plan was to go to dialysis immediately following for a session.  Following patient's procedure patient became more somnolent, noted to be hypoxic on her 3 L nasal cannula with agonal breathing.  CODE BLUE was called, was told she never lost pulses.  Patient was intubated for airway protection.  Documentation also indicates patient had been hypertensive during her procedure with systolics greater than 200.  Following intubation patient was transferred to the ICU for ongoing care and management.  Pulmonary critical care consulted.  Patient's nephrologist also alerted.       Personal History     Past Medical History:  Past Medical History:   Diagnosis Date    Allergic rhinitis     Anaphylactic shock, unspecified, initial encounter 2023    Anemia     Arthritis     Asthma     USE INHALERS AND NEBULIZERS    Back pain     Bladder disorder     Cancer     LEFT LUNG CANCER  SURGERY AND  CHEMO DONE  AND CURRENTLY 4/ 14/22  RIGHT LUNG CANCER HAS ONLY RECEIVED RADIATION THUS FAR    Chronic kidney disease     stage 3    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 DENIES CP BUT DOES HAVE SOA CHRONIC ISSUE COPD/LUNG CANCER    COPD (chronic obstructive pulmonary disease)     FOLLOWED BY DR MARIAJOSE BAILEY    Coronary artery disease     DENIES CP BUT DOES GET SOA MOST OF THE TIME WITH EXERTION BUT OCC AT REST CHRONIC ISSUE COPD/LUNG CANCER    Deep vein thrombosis     Diabetes mellitus     DOES NOT CHECK BS DAILY    Disease of thyroid gland     HYPOTHYROIDISM    Essential hypertension     Gastric ulcer     GERD (gastroesophageal reflux disease)     Heart murmur     History of transfusion     NO ISSUES POST TRANSFUSION WAS MANY YEARS AGO    Hyperlipemia     Leukocytopenia     FOLLOWED BY DR MIR BAILEY    Limb swelling     Lumbago     Lumbar spinal stenosis     Lung cancer     Migraine headache     Multiple joint pain     On home oxygen therapy     3L/NC PRN    Osteopenia     PNA (pneumonia) 05/04/2024    Pseudomonas pneumonia 04/29/2024    Reflux esophagitis     Shortness of breath     Thyroid nodule     HAS ULTRASOUND YEARLY BEING MONITORED    Vascular disease     Vitamin D deficiency        Past Surgical History:  Past Surgical History:   Procedure Laterality Date    ABDOMINAL SURGERY      ANGIOPLASTY RENAL ARTERY Left 06/14/2024    stent placement    APPENDECTOMY      ARTERIOGRAM N/A 6/14/2024    Procedure: Arteriogram, right radial access, aortogram and renal arteriogram with possible intervention.;  Surgeon: Dio Miguel MD;  Location: [facility] INVASIVE LOCATION;  Service: Peripheral Vascular;  Laterality: N/A;    ARTERIOVENOUS FISTULA/SHUNT SURGERY Left 05/10/2022    Procedure: Left basilic vein transposition;  Surgeon: Wan Barksdale MD;   Location: [facility] MAIN OR;  Service: Vascular;  Laterality: Left;    ARTERIOVENOUS FISTULA/SHUNT SURGERY Left 2023    Procedure: Ligation of left arm arteriovenous fistula;  Surgeon: Wan Barksdale MD;  Location: [facility] MAIN OR;  Service: Vascular;  Laterality: Left;    ARTERIOVENOUS FISTULA/SHUNT SURGERY Left 2023    Procedure: Creation of left arm arteriovenous graft;  Surgeon: Wan Barksdale MD;  Location: [facility] MAIN OR;  Service: Vascular;  Laterality: Left;    BRONCHOSCOPY N/A 2024    Procedure: BRONCHOSCOPY WITH BAL AND WASHINGS;  Surgeon: Khalif Yanez MD;  Location: [facility] ENDOSCOPY;  Service: Pulmonary;  Laterality: N/A;  MUCUS PLUGGING    CARDIAC CATHETERIZATION      CARDIAC SURGERY      CARDIAC SURGERY      fluid drained from heart    CATARACT EXTRACTION, BILATERAL      COLONOSCOPY      ENDOSCOPY  2016    FEMORAL ARTERY STENT Bilateral     HYSTERECTOMY      LUNG BIOPSY Left     lobectomy upper lung caner    LUNG VOLUME REDUCTION      OTHER SURGICAL HISTORY      artifical joints/limbs    REPLACEMENT TOTAL KNEE Left     UPPER GASTROINTESTINAL ENDOSCOPY         Family History:   Family History   Problem Relation Age of Onset    Arthritis Mother     Arthritis Father     Cancer Brother     Diabetes Brother     Other Brother         blood disease     Prostate cancer Brother     Cancer Brother     Prostate cancer Brother     Cancer Brother     Cancer Brother     Malig Hyperthermia Neg Hx     Colon cancer Neg Hx        Social History:   Social History     Socioeconomic History    Marital status:    Tobacco Use    Smoking status: Former     Current packs/day: 0.00     Average packs/day: 1 pack/day for 52.5 years (52.5 ttl pk-yrs)     Types: Cigarettes     Start date:      Quit date: 2004     Years since quittin.1     Passive exposure: Past    Smokeless tobacco: Never   Vaping Use    Vaping status: Never Used   Substance and Sexual Activity     Alcohol use: Never    Drug use: Never    Sexual activity: Defer       Home Medications:  Methylcellulose (Laxative), acetaminophen, albuterol sulfate HFA, amLODIPine, aspirin, budesonide, carvedilol, clopidogrel, dexlansoprazole, furosemide, ipratropium-albuterol, isosorbide mononitrate, levocetirizine, levothyroxine, linagliptin, losartan, nitroglycerin, ondansetron, rosuvastatin, saccharomyces boulardii, sevelamer, spironolactone, tobramycin, and vitamin D    Allergies:  No Known Allergies    Objective   Objective     Vitals:   Temp:  [97 °F (36.1 °C)-98.5 °F (36.9 °C)] 97.3 °F (36.3 °C)  Heart Rate:  [74-90] 74  Resp:  [20-24] 24  BP: (120-198)/(36-98) 133/40  Flow (L/min):  [4] 4  FiO2 (%):  [40 %] 40 %    Physical Exam    Constitutional: Chronically ill-appearing female, intubated, sedation running, able to open eyes and stick out tongue on command   Eyes: Pupils equal, sclerae anicteric, no conjunctival injection   HENT: NCAT, mucous membranes moist   Neck: Supple, no thyromegaly, no lymphadenopathy, trachea midline   Respiratory: Coarse ventilator breath sounds bilaterally   Cardiovascular: RRR, no murmurs, rubs, or gallops, palpable pedal pulses bilaterally   Gastrointestinal: Positive bowel sounds, soft, nondistended   Musculoskeletal: No bilateral ankle edema, no clubbing or cyanosis to extremities   Neurologic: Intubated with sedation running, however patient able to follow simple commands like stick out tongue    Result Review    Result Review:  I have personally reviewed the results from the time of this admission to 8/21/2024 17:39 EDT and agree with these findings:  []  Laboratory  []  Microbiology  []  Radiology  []  EKG/Telemetry   []  Cardiology/Vascular   []  Pathology  []  Old records  []  Other:      Assessment & Plan   Assessment / Plan     Assessment/Plan:   Acute on chronic hypoxic and hypercapnic respiratory failure  Pulmonary edema  Hypertensive emergency with systolics greater than  200  End-stage renal disease on dialysis  GERD  CAD  Peripheral vascular disease  Heart failure with preserved ejection fraction  Renal artery stenosis  Hypothyroidism  COPD chronically on 3 L nasal cannula    Plan:  Patient admitted to the hospital for further care and management  In the ICU in critical condition  Pulmonary critical care, nephrology, vascular surgery following, appreciate assistance  Plan is for patient to receive dialysis later today  Patient's chest x-ray, proBNP consistent with pulmonary edema likely secondary to hypertensive emergency flash pulmonary edema.  Plan is for dialysis later today.  Suspect patient will be able to extubate following fluid removal.  Unlikely to extubate late this evening.  Likely extubate in the morning if clinically stable.  Patient currently with no enteral access.  Will resume home medications in the morning once extubated.  If patient fails SBT she will need enteral access for home oral medications.  Patient's blood pressures currently controlled due to propofol she is on for sedation.  Suspect improvement in blood pressure with ultrafiltration as well.   Patient now with Duenas catheter in place, given Lasix 100 mg IV push x 1    Discussed case with bedside nurse, pulmonology critical care doctor, vascular surgery     VTE Prophylaxis:  Pharmacologic & mechanical VTE prophylaxis orders are present.    CODE STATUS:    Code Status (Patient has no pulse and is not breathing): CPR (Attempt to Resuscitate)  Medical Interventions (Patient has pulse or is breathing): Full Support      Admission Status:  I believe this patient meets inpatient status.    Electronically signed by Miguel Dennison MD, 08/21/24, 5:22 PM EDT.

## 2024-08-21 NOTE — PLAN OF CARE
Goal Outcome Evaluation:         Patient placed on ventilator in same day surgery, following procedure.  Patient's current settings are ac 24, 380, 40%, +5. Patient is alert, oriented and following commands. Patient is very uncomfortable with tube in place. Still awake and bucking the vent, regardless of sedation.  Plan to extubate once dialysis has been completed.

## 2024-08-21 NOTE — PLAN OF CARE
Goal Outcome Evaluation:  Plan of Care Reviewed With: patient, spouse, daughter        Progress: no change  Outcome Evaluation: Pt here for outpatient tunnel dialysis catheter removal, when pt arrived to post op, saturating 28%, RRT called, upon arrival pt was unresponsive and being bagged. Code blue was called but pt never lost a pulse. Pt was intubated in post op and admitted to ICU. Pt is following commands. Currently receiving stat dialysis. Propofol and fentanyl gtt infusing to keep pt comfortable. Temp sensing jennings catheter placed. Family at bedside.  Ila Hardy RN

## 2024-08-21 NOTE — Clinical Note
Prepped: right chest and right neck. Prepped with: Betadine. The patient was draped in a sterile fashion.

## 2024-08-22 LAB
ANION GAP SERPL CALCULATED.3IONS-SCNC: 10 MMOL/L (ref 5–15)
APTT PPP: 31.9 SECONDS (ref 24.2–34.2)
BASOPHILS # BLD AUTO: 0.08 10*3/MM3 (ref 0–0.2)
BASOPHILS NFR BLD AUTO: 0.7 % (ref 0–1.5)
BUN SERPL-MCNC: 13 MG/DL (ref 8–23)
BUN/CREAT SERPL: 5.7 (ref 7–25)
CALCIUM SPEC-SCNC: 8.6 MG/DL (ref 8.6–10.5)
CHLORIDE SERPL-SCNC: 97 MMOL/L (ref 98–107)
CO2 SERPL-SCNC: 29 MMOL/L (ref 22–29)
CREAT SERPL-MCNC: 2.29 MG/DL (ref 0.57–1)
DEPRECATED RDW RBC AUTO: 53.4 FL (ref 37–54)
EGFRCR SERPLBLD CKD-EPI 2021: 20.2 ML/MIN/1.73
EOSINOPHIL # BLD AUTO: 0.18 10*3/MM3 (ref 0–0.4)
EOSINOPHIL NFR BLD AUTO: 1.5 % (ref 0.3–6.2)
ERYTHROCYTE [DISTWIDTH] IN BLOOD BY AUTOMATED COUNT: 13.8 % (ref 12.3–15.4)
GLUCOSE BLDC GLUCOMTR-MCNC: 128 MG/DL (ref 70–99)
GLUCOSE BLDC GLUCOMTR-MCNC: 142 MG/DL (ref 70–99)
GLUCOSE BLDC GLUCOMTR-MCNC: 43 MG/DL (ref 70–99)
GLUCOSE BLDC GLUCOMTR-MCNC: 77 MG/DL (ref 70–99)
GLUCOSE BLDC GLUCOMTR-MCNC: 88 MG/DL (ref 70–99)
GLUCOSE BLDC GLUCOMTR-MCNC: 92 MG/DL (ref 70–99)
GLUCOSE SERPL-MCNC: 62 MG/DL (ref 65–99)
HCT VFR BLD AUTO: 39.5 % (ref 34–46.6)
HGB BLD-MCNC: 11.3 G/DL (ref 12–15.9)
HOLD SPECIMEN: NORMAL
IMM GRANULOCYTES # BLD AUTO: 0.06 10*3/MM3 (ref 0–0.05)
IMM GRANULOCYTES NFR BLD AUTO: 0.5 % (ref 0–0.5)
INR PPP: 1.16 (ref 0.86–1.15)
LYMPHOCYTES # BLD AUTO: 0.87 10*3/MM3 (ref 0.7–3.1)
LYMPHOCYTES NFR BLD AUTO: 7.3 % (ref 19.6–45.3)
MAGNESIUM SERPL-MCNC: 1.8 MG/DL (ref 1.6–2.4)
MCH RBC QN AUTO: 30 PG (ref 26.6–33)
MCHC RBC AUTO-ENTMCNC: 28.6 G/DL (ref 31.5–35.7)
MCV RBC AUTO: 104.8 FL (ref 79–97)
MONOCYTES # BLD AUTO: 1.89 10*3/MM3 (ref 0.1–0.9)
MONOCYTES NFR BLD AUTO: 15.9 % (ref 5–12)
NEUTROPHILS NFR BLD AUTO: 74.1 % (ref 42.7–76)
NEUTROPHILS NFR BLD AUTO: 8.8 10*3/MM3 (ref 1.7–7)
NRBC BLD AUTO-RTO: 0 /100 WBC (ref 0–0.2)
PHOSPHATE SERPL-MCNC: 3.5 MG/DL (ref 2.5–4.5)
PLATELET # BLD AUTO: 124 10*3/MM3 (ref 140–450)
PMV BLD AUTO: 9.5 FL (ref 6–12)
POTASSIUM SERPL-SCNC: 4.9 MMOL/L (ref 3.5–5.2)
PROTHROMBIN TIME: 15.1 SECONDS (ref 11.8–14.9)
RBC # BLD AUTO: 3.77 10*6/MM3 (ref 3.77–5.28)
SODIUM SERPL-SCNC: 136 MMOL/L (ref 136–145)
WBC NRBC COR # BLD AUTO: 11.88 10*3/MM3 (ref 3.4–10.8)

## 2024-08-22 PROCEDURE — 94799 UNLISTED PULMONARY SVC/PX: CPT

## 2024-08-22 PROCEDURE — 99291 CRITICAL CARE FIRST HOUR: CPT | Performed by: INTERNAL MEDICINE

## 2024-08-22 PROCEDURE — 85025 COMPLETE CBC W/AUTO DIFF WBC: CPT | Performed by: SURGERY

## 2024-08-22 PROCEDURE — 94003 VENT MGMT INPAT SUBQ DAY: CPT

## 2024-08-22 PROCEDURE — 85730 THROMBOPLASTIN TIME PARTIAL: CPT | Performed by: SURGERY

## 2024-08-22 PROCEDURE — 25010000002 PROPOFOL 10 MG/ML EMULSION: Performed by: PHYSICIAN ASSISTANT

## 2024-08-22 PROCEDURE — 83735 ASSAY OF MAGNESIUM: CPT | Performed by: INTERNAL MEDICINE

## 2024-08-22 PROCEDURE — 80048 BASIC METABOLIC PNL TOTAL CA: CPT | Performed by: INTERNAL MEDICINE

## 2024-08-22 PROCEDURE — 25810000003 DEXTROSE 5% IN LACTATED RINGERS PER 1000 ML: Performed by: PHYSICIAN ASSISTANT

## 2024-08-22 PROCEDURE — 84100 ASSAY OF PHOSPHORUS: CPT | Performed by: INTERNAL MEDICINE

## 2024-08-22 PROCEDURE — 82948 REAGENT STRIP/BLOOD GLUCOSE: CPT

## 2024-08-22 PROCEDURE — 25010000002 HEPARIN (PORCINE) PER 1000 UNITS: Performed by: SURGERY

## 2024-08-22 PROCEDURE — 99232 SBSQ HOSP IP/OBS MODERATE 35: CPT | Performed by: INTERNAL MEDICINE

## 2024-08-22 PROCEDURE — 85610 PROTHROMBIN TIME: CPT | Performed by: SURGERY

## 2024-08-22 RX ORDER — ASPIRIN 81 MG/1
81 TABLET ORAL DAILY
Status: DISCONTINUED | OUTPATIENT
Start: 2024-08-22 | End: 2024-08-26 | Stop reason: HOSPADM

## 2024-08-22 RX ORDER — CLOPIDOGREL BISULFATE 75 MG/1
75 TABLET ORAL DAILY
Status: DISCONTINUED | OUTPATIENT
Start: 2024-08-22 | End: 2024-08-26 | Stop reason: HOSPADM

## 2024-08-22 RX ORDER — SPIRONOLACTONE 25 MG/1
25 TABLET ORAL EVERY OTHER DAY
Status: DISCONTINUED | OUTPATIENT
Start: 2024-08-22 | End: 2024-08-26 | Stop reason: HOSPADM

## 2024-08-22 RX ORDER — DEXTROSE, SODIUM CHLORIDE, SODIUM LACTATE, POTASSIUM CHLORIDE, AND CALCIUM CHLORIDE 5; .6; .31; .03; .02 G/100ML; G/100ML; G/100ML; G/100ML; G/100ML
20 INJECTION, SOLUTION INTRAVENOUS CONTINUOUS
Status: DISCONTINUED | OUTPATIENT
Start: 2024-08-22 | End: 2024-08-23

## 2024-08-22 RX ORDER — CARVEDILOL 12.5 MG/1
12.5 TABLET ORAL 2 TIMES DAILY WITH MEALS
Status: DISCONTINUED | OUTPATIENT
Start: 2024-08-22 | End: 2024-08-26 | Stop reason: HOSPADM

## 2024-08-22 RX ORDER — LEVOTHYROXINE SODIUM 25 UG/1
25 TABLET ORAL
Status: DISCONTINUED | OUTPATIENT
Start: 2024-08-22 | End: 2024-08-26 | Stop reason: HOSPADM

## 2024-08-22 RX ORDER — ACETAMINOPHEN 325 MG/1
650 TABLET ORAL EVERY 6 HOURS PRN
Status: DISCONTINUED | OUTPATIENT
Start: 2024-08-22 | End: 2024-08-26 | Stop reason: HOSPADM

## 2024-08-22 RX ORDER — DEXTROSE MONOHYDRATE 25 G/50ML
50 INJECTION, SOLUTION INTRAVENOUS
Status: DISCONTINUED | OUTPATIENT
Start: 2024-08-22 | End: 2024-08-26 | Stop reason: HOSPADM

## 2024-08-22 RX ORDER — IPRATROPIUM BROMIDE AND ALBUTEROL SULFATE 2.5; .5 MG/3ML; MG/3ML
3 SOLUTION RESPIRATORY (INHALATION) EVERY 4 HOURS PRN
Status: DISCONTINUED | OUTPATIENT
Start: 2024-08-22 | End: 2024-08-26 | Stop reason: HOSPADM

## 2024-08-22 RX ORDER — BUDESONIDE 0.5 MG/2ML
0.5 INHALANT ORAL
Status: DISCONTINUED | OUTPATIENT
Start: 2024-08-22 | End: 2024-08-26 | Stop reason: HOSPADM

## 2024-08-22 RX ADMIN — SODIUM CHLORIDE, SODIUM LACTATE, POTASSIUM CHLORIDE, CALCIUM CHLORIDE AND DEXTROSE MONOHYDRATE 20 ML/HR: 5; 600; 310; 30; 20 INJECTION, SOLUTION INTRAVENOUS at 08:56

## 2024-08-22 RX ADMIN — HEPARIN SODIUM 5000 UNITS: 5000 INJECTION INTRAVENOUS; SUBCUTANEOUS at 20:35

## 2024-08-22 RX ADMIN — ASPIRIN 81 MG: 81 TABLET, COATED ORAL at 11:26

## 2024-08-22 RX ADMIN — ACETAMINOPHEN 650 MG: 325 TABLET ORAL at 11:26

## 2024-08-22 RX ADMIN — HEPARIN SODIUM 5000 UNITS: 5000 INJECTION INTRAVENOUS; SUBCUTANEOUS at 09:59

## 2024-08-22 RX ADMIN — DEXTROSE MONOHYDRATE 50 ML: 25 INJECTION, SOLUTION INTRAVENOUS at 07:41

## 2024-08-22 RX ADMIN — DEXTROSE MONOHYDRATE 50 ML: 25 INJECTION, SOLUTION INTRAVENOUS at 08:55

## 2024-08-22 RX ADMIN — BUDESONIDE 0.5 MG: 0.5 SUSPENSION RESPIRATORY (INHALATION) at 12:52

## 2024-08-22 RX ADMIN — BUDESONIDE 0.5 MG: 0.5 SUSPENSION RESPIRATORY (INHALATION) at 18:55

## 2024-08-22 RX ADMIN — CLOPIDOGREL BISULFATE 75 MG: 75 TABLET ORAL at 11:26

## 2024-08-22 RX ADMIN — PROPOFOL 20 MCG/KG/MIN: 10 INJECTION, EMULSION INTRAVENOUS at 02:59

## 2024-08-22 RX ADMIN — Medication 10 ML: at 09:59

## 2024-08-22 RX ADMIN — CHLORHEXIDINE GLUCONATE 15 ML: 1.2 RINSE ORAL at 08:55

## 2024-08-22 RX ADMIN — Medication 10 ML: at 20:35

## 2024-08-22 RX ADMIN — CARVEDILOL 12.5 MG: 12.5 TABLET, FILM COATED ORAL at 17:27

## 2024-08-22 RX ADMIN — Medication 75 MCG/HR: at 04:03

## 2024-08-22 RX ADMIN — LEVOTHYROXINE SODIUM 25 MCG: 0.03 TABLET ORAL at 11:26

## 2024-08-22 RX ADMIN — SPIRONOLACTONE 25 MG: 25 TABLET ORAL at 11:32

## 2024-08-22 NOTE — PROGRESS NOTES
[facility]   Hospitalist Progress Note  Date: 2024  Patient Name: Charlette Rai  : 1935  MRN: [id number]  Date of admission: 2024  Room/Bed: [facility]      Subjective   Subjective     Chief Complaint:   Consulted by vascular surgery, hypoxia following patient's tunneled dialysis catheter removal    Summary:  Charlette Rai is an 89 y.o. female with medical history of GERD, end-stage renal disease, coronary artery disease, peripheral vascular disease, hypertension, heart failure with preserved ejection fraction, renal artery stenosis, hypothyroidism, COPD chronically on 3 L nasal cannula     The patient presented to the hospital on 2024 for an elective tunneled dialysis catheter removal.  Patient with end-stage renal disease, had been using a right IJ tunneled dialysis catheter while her left upper extremity axial axilla AV loop graft was being optimized after pulling clots for short period of time.  Patient presented for removal of tunneled dialysis catheter, plan was to go to dialysis immediately following for a session.  Following patient's procedure patient became more somnolent, noted to be hypoxic on her 3 L nasal cannula with agonal breathing.  CODE BLUE was called, was told she never lost pulses.  Patient was intubated for airway protection.  Documentation also indicates patient had been hypertensive during her procedure with systolics greater than 200.  Following intubation patient was transferred to the ICU for ongoing care and management.  Pulmonary critical care consulted.  Patient's nephrologist also alerted.  Patient underwent dialysis with 4 L removal overnight with improvement in oxygenation.     Interval Followup:   Patient seen early this morning on rounds.  On pressure support, awake alert able to follow commands.  Patient extubated later in the morning.  Noted to be hypoglycemic, started on D5 infusion.  Following extubation patient able to eat and drink  well.    Objective   Objective     Vitals:   Temp:  [97 °F (36.1 °C)-99 °F (37.2 °C)] 99 °F (37.2 °C)  Heart Rate:  [68-90] 81  Resp:  [14-24] 14  BP: ()/() 112/67  Flow (L/min):  [3] 3  FiO2 (%):  [30 %-40 %] 30 %    Physical Exam               Constitutional: Intubated, minimal sedation.  Able to open eyes, stick out tongue to command              Eyes: Pupils equal, sclerae anicteric, no conjunctival injection              HENT: NCAT, mucous membranes moist              Neck: Supple, no thyromegaly, no lymphadenopathy, trachea midline              Respiratory: Coarse ventilator breath sounds bilaterally              Cardiovascular: RRR, no murmurs, rubs, or gallops, palpable pedal pulses bilaterally              Gastrointestinal: Positive bowel sounds, soft, nondistended              Musculoskeletal: No bilateral ankle edema, no clubbing or cyanosis to extremities              Neurologic: Intubated with sedation running, however patient able to follow simple commands like stick out tongue       Result Review    Result Review:  I have personally reviewed these results:  [x]  Laboratory      Lab 08/22/24  0636 08/22/24  0532 08/21/24  1519 08/21/24  1152   WBC  --  11.88*  --  8.00   HEMOGLOBIN  --  11.3*  --  10.1*   HEMATOCRIT  --  39.5  --  34.0   PLATELETS  --  124*  --  154   NEUTROS ABS  --  8.80*  --  6.57   IMMATURE GRANS (ABS)  --  0.06*  --  0.03   LYMPHS ABS  --  0.87  --  0.60*   MONOS ABS  --  1.89*  --  0.71   EOS ABS  --  0.18  --  0.06   MCV  --  104.8*  --  101.8*   LACTATE  --   --  <0.4  --    PROTIME 15.1*  --   --  14.6   APTT 31.9  --   --  28.7         Lab 08/22/24  0532 08/21/24  1519 08/21/24  1516 08/21/24  1152   SODIUM 136  --  135* 135*   POTASSIUM 4.9  --  5.5* 5.0   CHLORIDE 97*  --  96* 94*   CO2 29.0  --  31.7* 34.3*   ANION GAP 10.0  --  7.3 6.7   BUN 13  --  23 23   CREATININE 2.29* 3.45* 3.46* 3.18*   EGFR 20.2*  --  12.3* 13.6*   GLUCOSE 62*  --  154* 128*   CALCIUM  8.6  --  8.7 8.4*   MAGNESIUM 1.8  --   --   --    PHOSPHORUS 3.5  --   --   --              Lab 08/22/24  0636 08/21/24  1701 08/21/24  1516 08/21/24  1152   PROBNP  --   --  30,396.0*  --    HSTROP T  --  58* 55*  --    PROTIME 15.1*  --   --  14.6   INR 1.16*  --   --  1.12                 Lab 08/21/24  1519   PH, ARTERIAL 7.311*   PCO2, ARTERIAL 75.4*   PO2 .6*   O2 SATURATION .0*   FIO2 100   HCO3 ART 38.0*   BASE EXCESS ART 9.5*     Brief Urine Lab Results  (Last result in the past 365 days)        Color   Clarity   Blood   Leuk Est   Nitrite   Protein   CREAT   Urine HCG        05/01/24 2102 Dark Yellow   Cloudy   Negative   Small (1+)   Negative   >=300 mg/dL (3+)                 [x]  Microbiology   Microbiology Results (last 10 days)       ** No results found for the last 240 hours. **          [x]  Radiology  XR Chest 1 View    Result Date: 8/21/2024  Impression: 1.Tip of the endotracheal tube terminates in the upper thoracic trachea. The rebecca is not well visualized. 2.Diffusely increased bilateral airspace opacities, concerning for pulmonary edema or possibly multifocal pneumonia. 3.Stable bilateral scarring with left pleural plaques. Electronically Signed: Tanvi Alvarez  8/21/2024 3:46 PM EDT  Workstation ID: [id number]   []  EKG/Telemetry   []  Cardiology/Vascular   []  Pathology  []  Old records  []  Other:    Assessment & Plan   Assessment / Plan     Assessment:  Acute on chronic hypoxic and hypercapnic respiratory failure  Pulmonary edema  Hypertensive emergency with systolics greater than 200  End-stage renal disease on dialysis  GERD  CAD  Peripheral vascular disease  Heart failure with preserved ejection fraction  Renal artery stenosis  Hypothyroidism  COPD chronically on 3 L nasal cannula     Plan:  Patient admitted to the hospital for further care and management  Pulmonary critical care, nephrology, vascular surgery following, appreciate assistance  Received dialysis last night  with excess fluid removal  Extubated later in the morning to nasal cannula  Patient noted to be hypoglycemic therefore started on D5 infusion.  Continue to monitor glucose.  Will likely come off later today after starting diet  Patient to resume normal dialysis schedule Monday Wednesday Friday per nephrology  Started patient on majority of home medications now with enteral access     Discussed case with bedside nurse, pulmonologist, nephrologist       VTE Prophylaxis:  Pharmacologic & mechanical VTE prophylaxis orders are present.        CODE STATUS:   Code Status (Patient has no pulse and is not breathing): CPR (Attempt to Resuscitate)  Medical Interventions (Patient has pulse or is breathing): Full Support      Electronically signed by Miguel Dennison MD, 8/22/2024, 16:17 EDT.

## 2024-08-22 NOTE — PROGRESS NOTES
Patient seen and was extubated this morning and looks much better overall.  She is neurologically intact and without any labored breathing or signs of excessive severe fluid overload.  She was able to dialyze using her left upper extremity axilloaxillary AV graft on yesterday without any difficulties.  PT/OT evaluation and treatment has been ordered for severe deconditioning and date and the patient's disposition and future discharge once deemed appropriate by the primary team.  We will follow along at a distance.

## 2024-08-22 NOTE — PROGRESS NOTES
RT EQUIPMENT DEVICE RELATED - SKIN ASSESSMENT    RT Medical Equipment/Device:     ETT Garcia/[employer]fast    Skin Assessment:      Cheek:  Intact  Neck:  Intact  Lips:  Intact  Mouth:  Intact    Device Skin Pressure Protection:  Positioning supports utilized, Pressure points protected, and Skin-to-device areas padded:  [employer]fast    Nurse Notification:  Gladis Chandler, RRT

## 2024-08-22 NOTE — PAYOR COMM NOTE
"Charlette Rai (89 y.o. Female)     PATIENT INFORMATION  Name:  Charlette Rai  MRN#:     [id number]  :  1935         ADMISSION INFORMATION  CLASS: Inpatient   IP DOS: 2024        CURRENT ATTENDING PROVIDER INFORMATION  Name/NPI: Miguel Dennison MD [[id number]]  Phone:  Phone: [phone]  Fax:  [phone]        REQUESTING PROVIDER and RENDERING FACILITY  Name:  [facility]   NPI:  [id number]  TID:  [id number]  Address:      [facility] [address] [place] [address]  Phone:               [phone]  Fax:  [phone]        UTILIZATION REVIEW CONTACT INFORMATION  Phone:      [phone]  Fax:           [phone]        ADMISSION DIAGNOSIS  ESRD (end stage renal disease) on dialysis [N18.6, Z99.2]  Acute on Chronic Resp Failure with hypoxia & hypercapnia. J96.21; J96.22      ++++++++++++++++++++++++++++++++++++++++++++++++++++++++++++++++++++++++++++++++                Date of Birth   1935    Social Security Number       Address   [address] [place], [place] [address]    Home Phone   [phone]    MRN   [id number]       Religious   Gnosticist    Marital Status                               Admission Date   24    Admission Type   Elective    Admitting Provider   Jaden Casatñeda MD    Attending Provider   Miguel Dennison MD    Department, Room/Bed   [facility] INTENSIVE CARE UNIT, I04/       Discharge Date       Discharge Disposition       Discharge Destination                                 Attending Provider: Miguel Dennison MD    Allergies: No Known Allergies    Isolation: None   Infection: None   Code Status: CPR    Ht: 165.1 cm (65\")   Wt: 65.9 kg (145 lb 4.5 oz)    Admission Cmt: None   Principal Problem: ESRD (end stage renal disease) on dialysis [N18.6,Z99.2]                   Active Insurance as of 2024       Primary Coverage       Payor Plan Insurance Group Employer/Plan Group    HUMANA MEDICARE REPLACEMENT HU[employer]NA MED ADV GROUP " [id number]       Payor Plan Address Payor Plan Phone Number Payor Plan Fax Number Effective Dates    [address] [address] [phone]  1/1/2024 - None Entered    [facility] [phone]         Subscriber Name Subscriber Birth Date Member ID       JOSEFINA RIOS 1935 [id number]                        Respiratory Failure GRG Clinical Indications for Admission to Inpatient Care       Indications Met   Last updated by Tanvi Dietrich RN on 8/22/2024 [id number]     Review Status Created By   Primary Completed Tanvi Dietrich RN      Criteria Review   Respiratory Failure GRG Clinical Indications for Admission to Inpatient Care     Overall Determination: Indications Met     Criteria:  [×] Hospital admission is needed for appropriate care of the patient because of  1 or more  of the following  (1) (2) (3) (4) (5) (6) (7) (8):      [×] New (acute) need for mechanical invasive or noninvasive (eg, bilevel positive airway pressure (BPAP), volume-assured pressure support (VAPS), or volume control) ventilation (9) (10) (11) (12) (13) (14) (15)          8/22/2024  1:45 PM              -- 8/22/2024  1:45 PM by Tanvi Dietrich RN --                  Vent/O2      [×] Severe respiratory distress, as indicated by  1 or more  of the following :          [×] Altered mental status from respiratory disease              8/22/2024  1:45 PM                  -- 8/22/2024  1:45 PM by Tanvi Dietrich RN --                                            (X) Altered mental status (ie, different from baseline), as indicated by  1 or more  of the following  (1) (2) (3) (4):                      (X) Obtundation (ie, arousable only with strong stimuli, lessened interest in environment, slowed responses to stimulation)              8/22/2024  1:45 PM                  -- 8/22/2024  1:45 PM by Tanvi Dietrich RN --                      Somnolent. Agonal breathing     Notes:  -- 8/22/2024  1:45 PM by Tanvi Dietrich RN --      Retro review for 8/21:                  Pt  scheduled for outpatient removal of tunneled dialysis cath. S/P procedure, developed respiratory distress. Sats in the 20%. Somnolent. Agonal breathing. Maintained pulse.                   PMHx: ESRD on HD, Asthma, Hx lung cancer. CHF, COPD, CAD, DVT, HTN, TKR.                   CXR: Tip of the endotracheal tube terminates in the upper thoracic trachea. The rebecca is not well visualized. 2.Diffusely increased bilateral airspace opacities, concerning for pulmonary edema or possibly multifocal pneumonia. 3.Stable bilateral scarring with left pleural plaques.                  Troponin 55, BNP 30,396.0      K+ 5.5, Creatinine 3.46, GFR 12      H/H 10.1/34      ABGs on Vent 100% FiO2: pH 7.31, pCO2 75, HCO3 38.0; Sats 100%                  Admit:       Critical Care      Pulmonary consult      Vent/O2      Propofol IV drip      Lasix 100mg IV x1      Duonebs q4h prn      Pulmicort nebs bid      heparin SQ q12h      Feoley cath      Stat HD on day of admit.      Then HD on                                             History & Physical        ObrogertMiguel MD at [address] [place], [place] [address]IST HISTORY AND PHYSICAL  Date: 2024   Patient Name: Charltete Rai  : 1935  MRN: [id number]  Primary Care Physician:  Brennan Mosqueda MD  Date of admission: 2024    Subjective  Subjective     Chief Complaint:   Consulted by vascular surgery, hypoxia following patient's tunneled dialysis catheter removal today    HPI:    Charlette Rai is an 89 y.o. female with medical history of GERD, end-stage renal disease, coronary artery disease, peripheral vascular disease, hypertension, heart failure with preserved ejection fraction, renal artery stenosis, hypothyroidism, COPD chronically on 3 L nasal cannula    The patient presented to the hospital on 2024 for an elective tunneled dialysis catheter removal.  Patient with end-stage renal disease, had been using a right IJ tunneled dialysis  catheter while her left upper extremity axial axilla AV loop graft was being optimized after pulling clots for short period of time.  Patient presented for removal of tunneled dialysis catheter, plan was to go to dialysis immediately following for a session.  Following patient's procedure patient became more somnolent, noted to be hypoxic on her 3 L nasal cannula with agonal breathing.  CODE BLUE was called, was told she never lost pulses.  Patient was intubated for airway protection.  Documentation also indicates patient had been hypertensive during her procedure with systolics greater than 200.  Following intubation patient was transferred to the ICU for ongoing care and management.  Pulmonary critical care consulted.  Patient's nephrologist also alerted.       Personal History     Past Medical History:  Past Medical History:   Diagnosis Date    Allergic rhinitis     Anaphylactic shock, unspecified, initial encounter 12/05/2023    Anemia     Arthritis     Asthma     USE INHALERS AND NEBULIZERS    Back pain     Bladder disorder     Cancer     LEFT LUNG CANCER 2005 SURGERY AND CHEMO DONE  AND CURRENTLY 4/ 14/22  RIGHT LUNG CANCER HAS ONLY RECEIVED RADIATION THUS FAR    Chronic kidney disease     stage 3    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 DENIES CP BUT DOES HAVE SOA CHRONIC ISSUE COPD/LUNG CANCER    COPD (chronic obstructive pulmonary disease)     FOLLOWED BY DR MARIAJOSE BAILEY    Coronary artery disease     DENIES CP BUT DOES GET SOA MOST OF THE TIME WITH EXERTION BUT OCC AT REST CHRONIC ISSUE COPD/LUNG CANCER    Deep vein thrombosis     Diabetes mellitus     DOES NOT CHECK BS DAILY    Disease of thyroid gland     HYPOTHYROIDISM    Essential hypertension     Gastric ulcer     GERD (gastroesophageal reflux disease)     Heart murmur     History of  transfusion     NO ISSUES POST TRANSFUSION WAS MANY YEARS AGO    Hyperlipemia     Leukocytopenia     FOLLOWED BY DR MIR BAILEY    Limb swelling     Lumbago     Lumbar spinal stenosis     Lung cancer     Migraine headache     Multiple joint pain     On home oxygen therapy     3L/NC PRN    Osteopenia     PNA (pneumonia) 05/04/2024    Pseudomonas pneumonia 04/29/2024    Reflux esophagitis     Shortness of breath     Thyroid nodule     HAS ULTRASOUND YEARLY BEING MONITORED    Vascular disease     Vitamin D deficiency        Past Surgical History:  Past Surgical History:   Procedure Laterality Date    ABDOMINAL SURGERY      ANGIOPLASTY RENAL ARTERY Left 06/14/2024    stent placement    APPENDECTOMY      ARTERIOGRAM N/A 6/14/2024    Procedure: Arteriogram, right radial access, aortogram and renal arteriogram with possible intervention.;  Surgeon: Dio Miguel MD;  Location: [facility] CATH INVASIVE LOCATION;  Service: Peripheral Vascular;  Laterality: N/A;    ARTERIOVENOUS FISTULA/SHUNT SURGERY Left 05/10/2022    Procedure: Left basilic vein transposition;  Surgeon: Wan Barksdale MD;  Location: [facility] MAIN OR;  Service: Vascular;  Laterality: Left;    ARTERIOVENOUS FISTULA/SHUNT SURGERY Left 03/23/2023    Procedure: Ligation of left arm arteriovenous fistula;  Surgeon: Wan Barksdale MD;  Location: [facility] MAIN OR;  Service: Vascular;  Laterality: Left;    ARTERIOVENOUS FISTULA/SHUNT SURGERY Left 11/30/2023    Procedure: Creation of left arm arteriovenous graft;  Surgeon: Wan Barksdale MD;  Location: [facility] MAIN OR;  Service: Vascular;  Laterality: Left;    BRONCHOSCOPY N/A 04/24/2024    Procedure: BRONCHOSCOPY WITH BAL AND WASHINGS;  Surgeon: Khalif Yanez MD;  Location: [facility] ENDOSCOPY;  Service: Pulmonary;  Laterality: N/A;  MUCUS PLUGGING    CARDIAC CATHETERIZATION  1996    CARDIAC SURGERY      CARDIAC SURGERY      fluid drained from heart    CATARACT EXTRACTION, BILATERAL  2003    COLONOSCOPY  2014    ENDOSCOPY   2016 2019    FEMORAL ARTERY STENT Bilateral     HYSTERECTOMY      LUNG BIOPSY Left 2005    lobectomy upper lung caner    LUNG VOLUME REDUCTION      OTHER SURGICAL HISTORY      artifical joints/limbs    REPLACEMENT TOTAL KNEE Left 2016    UPPER GASTROINTESTINAL ENDOSCOPY         Home Medications:  Methylcellulose (Laxative), acetaminophen, albuterol sulfate HFA, amLODIPine, aspirin, budesonide, carvedilol, clopidogrel, dexlansoprazole, furosemide, ipratropium-albuterol, isosorbide mononitrate, levocetirizine, levothyroxine, linagliptin, losartan, nitroglycerin, ondansetron, rosuvastatin, saccharomyces boulardii, sevelamer, spironolactone, tobramycin, and vitamin D    Allergies:  No Known Allergies    Objective  Objective     Vitals:   Temp:  [97 °F (36.1 °C)-98.5 °F (36.9 °C)] 97.3 °F (36.3 °C)  Heart Rate:  [74-90] 74  Resp:  [20-24] 24  BP: (120-198)/(36-98) 133/40  Flow (L/min):  [4] 4  FiO2 (%):  [40 %] 40 %    Physical Exam    Constitutional: Chronically ill-appearing female, intubated, sedation running, able to open eyes and stick out tongue on command   Eyes: Pupils equal, sclerae anicteric, no conjunctival injection   HENT: NCAT, mucous membranes moist   Neck: Supple, no thyromegaly, no lymphadenopathy, trachea midline   Respiratory: Coarse ventilator breath sounds bilaterally   Cardiovascular: RRR, no murmurs, rubs, or gallops, palpable pedal pulses bilaterally   Gastrointestinal: Positive bowel sounds, soft, nondistended   Musculoskeletal: No bilateral ankle edema, no clubbing or cyanosis to extremities   Neurologic: Intubated with sedation running, however patient able to follow simple commands like stick out tongue    Result Review   Result Review:  I have personally reviewed the results from the time of this admission to 8/21/2024 17:39 EDT and agree with these findings:  []  Laboratory  []  Microbiology  []  Radiology  []  EKG/Telemetry   []  Cardiology/Vascular   []  Pathology  []  Old records  []   Other:      Assessment & Plan  Assessment / Plan     Assessment/Plan:   Acute on chronic hypoxic and hypercapnic respiratory failure  Pulmonary edema  Hypertensive emergency with systolics greater than 200  End-stage renal disease on dialysis  GERD  CAD  Peripheral vascular disease  Heart failure with preserved ejection fraction  Renal artery stenosis  Hypothyroidism  COPD chronically on 3 L nasal cannula    Plan:  Patient admitted to the hospital for further care and management  In the ICU in critical condition  Pulmonary critical care, nephrology, vascular surgery following, appreciate assistance  Plan is for patient to receive dialysis later today  Patient's chest x-ray, proBNP consistent with pulmonary edema likely secondary to hypertensive emergency flash pulmonary edema.  Plan is for dialysis later today.  Suspect patient will be able to extubate following fluid removal.  Unlikely to extubate late this evening.  Likely extubate in the morning if clinically stable.  Patient currently with no enteral access.  Will resume home medications in the morning once extubated.  If patient fails SBT she will need enteral access for home oral medications.  Patient's blood pressures currently controlled due to propofol she is on for sedation.  Suspect improvement in blood pressure with ultrafiltration as well.   Patient now with Duenas catheter in place, given Lasix 100 mg IV push x 1    Discussed case with bedside nurse, pulmonology critical care doctor, vascular surgery     VTE Prophylaxis:  Pharmacologic & mechanical VTE prophylaxis orders are present.    CODE STATUS:    Code Status (Patient has no pulse and is not breathing): CPR (Attempt to Resuscitate)  Medical Interventions (Patient has pulse or is breathing): Full Support      Admission Status:  I believe this patient meets inpatient status.    Electronically signed by Miguel Dennison MD, 08/21/24, 5:22 PM EDT.          Electronically signed by Miguel Dennison MD at  08/21/24 1850         Current Facility-Administered Medications   Medication Dose Route Frequency Provider Last Rate Last Admin    acetaminophen (TYLENOL) tablet 650 mg  650 mg Oral Q6H PRN Kemi Chacon PA   650 mg at 08/22/24 1126    aspirin EC tablet 81 mg  81 mg Oral Daily Miguel Dennison MD   81 mg at 08/22/24 1126    sennosides-docusate (PERICOLACE) 8.6-50 MG per tablet 2 tablet  2 tablet Oral BID PRN Miguel Dennison MD        And    polyethylene glycol (MIRALAX) packet 17 g  17 g Oral Daily PRN Miguel Dennison MD        And    bisacodyl (DULCOLAX) EC tablet 5 mg  5 mg Oral Daily PRN Miguel Dennison MD        And    bisacodyl (DULCOLAX) suppository 10 mg  10 mg Rectal Daily PRN Miguel Dennison MD        budesonide (PULMICORT) nebulizer solution 0.5 mg  0.5 mg Nebulization BID - RT Miguel Dennison MD   0.5 mg at 08/22/24 1252    calcium carbonate (TUMS) chewable tablet 500 mg (200 mg elemental)  1 tablet Oral BID PRN Miguel Dennison MD        carvedilol (COREG) tablet 12.5 mg  12.5 mg Oral BID With Meals Miguel Dennison MD        clopidogrel (PLAVIX) tablet 75 mg  75 mg Oral Daily Miguel Dennison MD   75 mg at 08/22/24 1126    dextrose (D50W) (25 g/50 mL) IV injection 50 mL  50 mL Intravenous Q1H PRN Kemi Chacon PA   50 mL at 08/22/24 0855    dextrose 5 % and lactated Ringer's infusion  20 mL/hr Intravenous Continuous Kemi Chacon PA 20 mL/hr at 08/22/24 0856 20 mL/hr at 08/22/24 0856    heparin (porcine) 5000 UNIT/ML injection 5,000 Units  5,000 Units Subcutaneous Q12H Jaden Castañeda MD   5,000 Units at 08/22/24 0959    hydrALAZINE (APRESOLINE) injection 10 mg  10 mg Intravenous Once Jaden Castañeda MD        ipratropium-albuterol (DUO-NEB) nebulizer solution 3 mL  3 mL Nebulization Q4H PRN Miguel Dennison MD        levothyroxine (SYNTHROID, LEVOTHROID) tablet 25 mcg  25 mcg Oral Q AM Miguel Dennison MD   25 mcg at 08/22/24 1126    nitroglycerin (NITROSTAT) SL tablet 0.4 mg  0.4 mg Sublingual Q5 Min PRN Jaden Castañeda MD         ondansetron ODT (ZOFRAN-ODT) disintegrating tablet 4 mg  4 mg Oral Q6H PRN Miguel Dennison MD        sodium chloride 0.9 % flush 10 mL  10 mL Intravenous Q12H Miguel Dennison MD   10 mL at 08/22/24 0959    sodium chloride 0.9 % flush 10 mL  10 mL Intravenous PRN Miguel Dennison MD        sodium chloride 0.9 % infusion 40 mL  40 mL Intravenous PRN Miguel Dennison MD        spironolactone (ALDACTONE) tablet 25 mg  25 mg Oral Every Other Day Miguel Dennison MD   25 mg at 08/22/24 1132     Lab Results (last 24 hours)       Procedure Component Value Units Date/Time    POC Glucose Once [[id number]]  (Normal) Collected: 08/22/24 1205    Specimen: Blood Updated: 08/22/24 1209     Glucose 88 mg/dL      Comment: Serial Number: [id number]:  [id number]       POC Glucose Once [[id number]]  (Abnormal) Collected: 08/22/24 0957    Specimen: Blood Updated: 08/22/24 0959     Glucose 142 mg/dL      Comment: Serial Number: [id number]:  [id number]       POC Glucose Once [[id number]]  (Normal) Collected: 08/22/24 0809    Specimen: Blood Updated: 08/22/24 0810     Glucose 92 mg/dL      Comment: Serial Number: [id number]:  [id number]       POC Glucose Once [[id number]]  (Abnormal) Collected: 08/22/24 0736    Specimen: Blood Updated: 08/22/24 0739     Glucose 43 mg/dL      Comment: Serial Number: [id number]:  [id number]       aPTT [[id number]]  (Normal) Collected: 08/22/24 0636    Specimen: Blood Updated: 08/22/24 0723     PTT 31.9 seconds     Protime-INR [[id number]]  (Abnormal) Collected: 08/22/24 0636    Specimen: Blood Updated: 08/22/24 0723     Protime 15.1 Seconds      INR 1.16    Narrative:      Suggested Therapeutic Ranges For Oral Anticoagulant Therapy:  Level of Therapy                      INR Target Range  Standard Dose                            2.0-3.0  High Dose                                2.5-3.5  Patients not receiving anticoagulant  Therapy Normal Range                     0.86-1.15    Extra Tubes  [[id number]] Collected: 08/22/24 0636    Specimen: Blood, Venous Line Updated: 08/22/24 0645    Narrative:      The following orders were created for panel order Extra Tubes.  Procedure                               Abnormality         Status                     ---------                               -----------         ------                     Green Top (Gel)[[id number]]                                  Final result                 Please view results for these tests on the individual orders.    Green Top (Gel) [[id number]] Collected: 08/22/24 0636    Specimen: Blood Updated: 08/22/24 0645     Extra Tube Hold for add-ons.     Comment: Auto resulted.       Magnesium [[id number]]  (Normal) Collected: 08/22/24 0532    Specimen: Blood Updated: 08/22/24 0610     Magnesium 1.8 mg/dL     Basic Metabolic Panel [[id number]]  (Abnormal) Collected: 08/22/24 0532    Specimen: Blood Updated: 08/22/24 0610     Glucose 62 mg/dL      BUN 13 mg/dL      Creatinine 2.29 mg/dL      Sodium 136 mmol/L      Potassium 4.9 mmol/L      Comment: Slight hemolysis detected by analyzer. Result may be falsely elevated.        Chloride 97 mmol/L      CO2 29.0 mmol/L      Calcium 8.6 mg/dL      BUN/Creatinine Ratio 5.7     Anion Gap 10.0 mmol/L      eGFR 20.2 mL/min/1.73     Narrative:      GFR Normal >60  Chronic Kidney Disease <60  Kidney Failure <15    The GFR formula is only valid for adults with stable renal function between ages 18 and 70.    Phosphorus [[id number]]  (Normal) Collected: 08/22/24 0532    Specimen: Blood Updated: 08/22/24 0610     Phosphorus 3.5 mg/dL     CBC & Differential [[id number]]  (Abnormal) Collected: 08/22/24 0532    Specimen: Blood Updated: 08/22/24 0554    Narrative:      The following orders were created for panel order CBC & Differential.  Procedure                               Abnormality         Status                     ---------                               -----------         ------                     CBC  Auto Differential[[id number]]        Abnormal            Final result                 Please view results for these tests on the individual orders.    CBC Auto Differential [[id number]]  (Abnormal) Collected: 08/22/24 0532    Specimen: Blood Updated: 08/22/24 0554     WBC 11.88 10*3/mm3      RBC 3.77 10*6/mm3      Hemoglobin 11.3 g/dL      Hematocrit 39.5 %      .8 fL      MCH 30.0 pg      MCHC 28.6 g/dL      RDW 13.8 %      RDW-SD 53.4 fl      MPV 9.5 fL      Platelets 124 10*3/mm3      Neutrophil % 74.1 %      Lymphocyte % 7.3 %      Monocyte % 15.9 %      Eosinophil % 1.5 %      Basophil % 0.7 %      Immature Grans % 0.5 %      Neutrophils, Absolute 8.80 10*3/mm3      Lymphocytes, Absolute 0.87 10*3/mm3      Monocytes, Absolute 1.89 10*3/mm3      Eosinophils, Absolute 0.18 10*3/mm3      Basophils, Absolute 0.08 10*3/mm3      Immature Grans, Absolute 0.06 10*3/mm3      nRBC 0.0 /100 WBC     High Sensitivity Troponin T 2Hr [[id number]]  (Abnormal) Collected: 08/21/24 1701    Specimen: Blood from Hand, Right Updated: 08/21/24 1744     HS Troponin T 58 ng/L      Troponin T Delta 3 ng/L     Narrative:      High Sensitive Troponin T Reference Range:  <14.0 ng/L- Negative Female for AMI  <22.0 ng/L- Negative Male for AMI  >=14 - Abnormal Female indicating possible myocardial injury.  >=22 - Abnormal Male indicating possible myocardial injury.   Clinicians would have to utilize clinical acumen, EKG, Troponin, and serial changes to determine if it is an Acute Myocardial Infarction or myocardial injury due to an underlying chronic condition.         High Sensitivity Troponin T [[id number]]  (Abnormal) Collected: 08/21/24 1516    Specimen: Blood from Hand, Right Updated: 08/21/24 1615     HS Troponin T 55 ng/L     Narrative:      High Sensitive Troponin T Reference Range:  <14.0 ng/L- Negative Female for AMI  <22.0 ng/L- Negative Male for AMI  >=14 - Abnormal Female indicating possible myocardial injury.  >=22 - Abnormal  Male indicating possible myocardial injury.   Clinicians would have to utilize clinical acumen, EKG, Troponin, and serial changes to determine if it is an Acute Myocardial Infarction or myocardial injury due to an underlying chronic condition.         BNP [[id number]]  (Abnormal) Collected: 08/21/24 1516    Specimen: Blood from Hand, Right Updated: 08/21/24 1610     proBNP 30,396.0 pg/mL     Narrative:      This assay is used as an aid in the diagnosis of individuals suspected of having heart failure. It can be used as an aid in the diagnosis of acute decompensated heart failure (ADHF) in patients presenting with signs and symptoms of ADHF to the emergency department (ED). In addition, NT-proBNP of <300 pg/mL indicates ADHF is not likely.    Age Range Result Interpretation  NT-proBNP Concentration (pg/mL:      <50             Positive            >450                   Gray                 300-450                    Negative             <300    50-75           Positive            >900                  Gray                300-900                  Negative            <300      >75             Positive            >1800                  Gray                300-1800                  Negative            <300    Basic Metabolic Panel [[id number]]  (Abnormal) Collected: 08/21/24 1516    Specimen: Blood from Hand, Right Updated: 08/21/24 1544     Glucose 154 mg/dL      BUN 23 mg/dL      Creatinine 3.46 mg/dL      Sodium 135 mmol/L      Potassium 5.5 mmol/L      Chloride 96 mmol/L      CO2 31.7 mmol/L      Calcium 8.7 mg/dL      BUN/Creatinine Ratio 6.6     Anion Gap 7.3 mmol/L      eGFR 12.3 mL/min/1.73      Comment: <15 Indicative of kidney failure       Narrative:      GFR Normal >60  Chronic Kidney Disease <60  Kidney Failure <15    The GFR formula is only valid for adults with stable renal function between ages 18 and 70.    POC Chem Panel [[id number]]  (Abnormal) Collected: 08/21/24 1519    Specimen: Arterial Blood Updated:  08/21/24 1525     Glucose 141 mg/dL      Comment: Serial Number: [id number]:  [id number]        Sodium 139 mmol/L      POC Potassium 4.8 mmol/L      Ionized Calcium 1.22 mmol/L      Chloride 96 mmol/L      Creatinine 3.45 mg/dL      BUN 22 mg/dL      CO2 Content >34.54 mmol/L      Notified Who kristopher     Read Back Yes    Blood Gas, Arterial - [[id number]]  (Abnormal) Collected: 08/21/24 1519    Specimen: Arterial Blood Updated: 08/21/24 1525     Site Left Radial     Home's Test Positive     pH, Arterial 7.311 pH units      pCO2, Arterial 75.4 mm Hg      pO2, Arterial 537.6 mm Hg      HCO3, Arterial 38.0 mmol/L      Base Excess, Arterial 9.5 mmol/L      Comment: Serial Number: [id number]:  [id number]        O2 Saturation, Arterial 100.0 %      Hemoglobin, Blood Gas 10.8 g/dL      Hematocrit, Blood Gas 32.0 %      Barometric Pressure for Blood Gas 748.1000 mmHg      Modality Adult Vent     FIO2 100 %      Ventilator Mode AC     Set Tidal Volume 380     PEEP 5     Notified Who [email or web address]     Read Back Yes     Notified Time --     Hemodilution No     Respiratory Rate 18     PO2/FIO2 538    POC Lactate [[id number]]  (Normal) Collected: 08/21/24 1519    Specimen: Arterial Blood Updated: 08/21/24 1525     Lactate <0.4 mmol/L      Comment: Serial Number: [id number]:  [id number]       POC Glucose Once [[id number]]  (Abnormal) Collected: 08/21/24 1448    Specimen: Blood Updated: 08/21/24 1449     Glucose 133 mg/dL      Comment: Serial Number: [id number]:  [id number]             Imaging Results (Last 24 Hours)       Procedure Component Value Units Date/Time    XR Chest 1 View [[id number]] Collected: 08/21/24 1540     Updated: 08/21/24 1548    Narrative:      XR CHEST 1 VW    Date of Exam: 8/21/2024 3:30 PM EDT    Indication: ETT PLACEMENT    Comparison: AP chest x-ray 7/29/2024, CT chest 4/22/2024    Findings:  The endotracheal tube is looped over itself. Tip of the endotracheal tube terminates in the upper thoracic trachea below  the thoracic inlet. The rebecca is not well visualized.    Linear scarring in the right midlung is stable. Scarring and pleural plaques on the left are also stable, but there are diffusely increased bilateral airspace opacities. No definite pneumothorax is seen on these limited supine images. Cardiomediastinal   contours are obscured.      Impression:      Impression:  1.Tip of the endotracheal tube terminates in the upper thoracic trachea. The rebecca is not well visualized.  2.Diffusely increased bilateral airspace opacities, concerning for pulmonary edema or possibly multifocal pneumonia.  3.Stable bilateral scarring with left pleural plaques.      Electronically Signed: Tanvi Alvarez    8/21/2024 3:46 PM EDT    Workstation ID: [id number]          Orders (last 24 hrs)        Start     Ordered    08/23/24 0600  CBC (No Diff)  Daily       08/22/24 0912    08/23/24 0600  Basic Metabolic Panel  Morning Draw         08/22/24 1129    08/23/24 0600  Hemodialysis Inpatient  Once         08/22/24 1130    08/23/24 0400  Comprehensive Metabolic Panel  Daily       08/22/24 0912    08/23/24 0400  Magnesium  Daily       08/22/24 0912    08/23/24 0400  Phosphorus  Daily       08/22/24 0912    08/22/24 1800  carvedilol (COREG) tablet 12.5 mg  2 Times Daily With Meals         08/22/24 1114    08/22/24 1210  POC Glucose Once  PROCEDURE ONCE        Comments: Complete no more than 45 minutes prior to patient eating      08/22/24 1205    08/22/24 1200  aspirin EC tablet 81 mg  Daily         08/22/24 1114    08/22/24 1200  budesonide (PULMICORT) nebulizer solution 0.5 mg  2 Times Daily - RT         08/22/24 1114    08/22/24 1200  clopidogrel (PLAVIX) tablet 75 mg  Daily         08/22/24 1114    08/22/24 1200  spironolactone (ALDACTONE) tablet 25 mg  Every Other Day         08/22/24 1114    08/22/24 1130  levothyroxine (SYNTHROID, LEVOTHROID) tablet 25 mcg  Every Early Morning         08/22/24 1114    08/22/24 1120  acetaminophen (TYLENOL)  tablet 650 mg  Every 6 Hours PRN         08/22/24 1121    08/22/24 1112  ipratropium-albuterol (DUO-NEB) nebulizer solution 3 mL  Every 4 Hours PRN         08/22/24 1114    08/22/24 1049  Diet: Renal; Low Potassium; Fluid Consistency: Thin (IDDSI 0)  Diet Effective Now,   Status:  Canceled         08/22/24 1048    08/22/24 1049  Diet: Renal; Low Sodium (2-3g), Low Potassium; Fluid Consistency: Thin (IDDSI 0)  Diet Effective Now         08/22/24 1048    08/22/24 1000  POC Glucose Once  PROCEDURE ONCE        Comments: Complete no more than 45 minutes prior to patient eating      08/22/24 0957    08/22/24 0930  dextrose 5 % and lactated Ringer's infusion  Continuous         08/22/24 0831    08/22/24 0858  Restraints Non-Violent or Non-Self Destructive  Calendar Day,   Status:  Canceled         08/22/24 0857    08/22/24 0858  Extubation  Once         08/22/24 0857    08/22/24 0816  Basic Metabolic Panel  Morning Draw,   Status:  Canceled         08/22/24 0815    08/22/24 0811  POC Glucose Once  PROCEDURE ONCE        Comments: Complete no more than 45 minutes prior to patient eating      08/22/24 0809    08/22/24 0740  POC Glucose Once  PROCEDURE ONCE        Comments: Complete no more than 45 minutes prior to patient eating      08/22/24 0736    08/22/24 0737  dextrose (D50W) (25 g/50 mL) IV injection 50 mL  Every 1 Hour PRN         08/22/24 0737    08/22/24 0641  Extra Tubes  Once         08/22/24 0640    08/22/24 0641  Green Top (Gel)  PROCEDURE ONCE         08/22/24 0640    08/22/24 0600  CBC & Differential  Morning Draw         08/21/24 1527    08/22/24 0600  aPTT  Morning Draw         08/21/24 1527    08/22/24 0600  Protime-INR  Morning Draw         08/21/24 1527    08/22/24 0600  Basic Metabolic Panel  Morning Draw         08/21/24 1525    08/22/24 0600  CBC Auto Differential  Morning Draw,   Status:  Canceled         08/21/24 1525    08/22/24 0600  Magnesium  Morning Draw         08/21/24 1525    08/22/24 0600   "Phosphorus  Morning Draw         08/21/24 1740    08/22/24 0600  CBC Auto Differential  PROCEDURE ONCE         08/21/24 2202    08/22/24 0200  Spontaneous Awakening Trial  Daily - SAT,   Status:  Canceled       08/21/24 1628    08/22/24 0200  Spontaneous Breathing Trial  Daily - SBT,   Status:  Canceled       08/21/24 1628    08/22/24 0000  Tobacco Cessation Education  Prior to Discharge         08/21/24 1525    08/21/24 2100  heparin (porcine) 5000 UNIT/ML injection 5,000 Units  Every 12 Hours Scheduled         08/21/24 1527    08/21/24 2100  sodium chloride 0.9 % flush 10 mL  Every 12 Hours Scheduled         08/21/24 1525    08/21/24 2100  chlorhexidine (PERIDEX) 0.12 % solution 15 mL  Every 12 Hours Scheduled,   Status:  Discontinued         08/21/24 1628    08/21/24 2000  Oral Care & Teeth Brushing - Intubated Patient  Every 4 Hours,   Status:  Canceled      Comments: [place] Teeth at Least 2x/day    08/21/24 1628    08/21/24 1800  Oral Care  2 Times Daily,   Status:  Canceled       08/21/24 1525    08/21/24 1746  Restraints Non-Violent or Non-Self Destructive  Calendar Day,   Status:  Canceled         08/21/24 1746    08/21/24 1716  High Sensitivity Troponin T 2Hr  PROCEDURE ONCE         08/21/24 1615    08/21/24 1715  famotidine (PEPCID) injection 20 mg  Daily,   Status:  Discontinued         08/21/24 1628    08/21/24 1715  fentaNYL 1000 mcg in 100 mL NS infusion  Titrated,   Status:  Discontinued         08/21/24 1629    08/21/24 1712  Insert Indwelling Urinary Catheter  Once        Comments: Follow Protocol As Outlined in Process Instructions (Open Order Report to View Full Instructions)   Placed in \"And\" Linked Group    08/21/24 1711    08/21/24 1712  Assess Need for Indwelling Urinary Catheter - Follow Removal Protocol  Continuous        Comments: Follow Protocol As Outlined in Process Instructions (Open Order Report to View Full Instructions)   Placed in \"And\" Linked Group    08/21/24 1711    08/21/24 1712  " "Urinary Catheter Care  Every Shift      Placed in \"And\" Linked Group    08/21/24 1711    08/21/24 1700  furosemide (LASIX) injection 100 mg  Once         08/21/24 1600    08/21/24 1628  Ventilator - Vent Mode: AC/VC; Rate: Other; Rate: 24; FiO2: Titrate Per SpO2; Titrate Oxygen for SpO2: 90% or Greater; PEEP: 5; Tidal Volume: mL; TV: 380  Continuous,   Status:  Canceled         08/21/24 1628    08/21/24 1627  Elevate HOB  Continuous,   Status:  Canceled         08/21/24 1628    08/21/24 1627  Subglottic Suctioning Must Be Done Every 6 Hours  Per Order Details,   Status:  Canceled        Comments: At Least Every 6 Hours    08/21/24 1628    08/21/24 1627  RN May Place Order For ABG As Needed for Respiratory Distress  Continuous,   Status:  Canceled         08/21/24 1628    08/21/24 1620  Target Arousal Level RASS 0 to -2  Continuous         08/21/24 1619    08/21/24 1619  Respiratory communication  Once        Comments: Advance ETT tube to 23 at the lip    08/21/24 1619    08/21/24 1615  propofol (DIPRIVAN) infusion 10 mg/mL 100 mL  Titrated,   Status:  Discontinued         08/21/24 1517    08/21/24 1600  Incentive Spirometry  Every 2 Hours While Awake       08/21/24 1527    08/21/24 1600  Vital Signs  Every 4 Hours       08/21/24 1525    08/21/24 1600  Strict Intake & Output  Every Hour       08/21/24 1551    08/21/24 1550  Hemodialysis Inpatient  STAT        Comments: Thank you very much!    08/21/24 1551    08/21/24 1542  ECG 12 Lead Pre-Op / Pre-Procedure  Once         08/21/24 1541    08/21/24 1538  BNP  Once         08/21/24 1541    08/21/24 1537  High Sensitivity Troponin T  Once         08/21/24 1541    08/21/24 1528  Continuous Pulse Oximetry  Continuous         08/21/24 1527    08/21/24 1528  Continuous Cardiac Monitoring  Continuous        Comments: Follow Standing Orders As Outlined in Process Instructions (Open Order Report to View Full Instructions)    08/21/24 1527    08/21/24 1528  Maintain IV Access  " "Continuous         08/21/24 1527    08/21/24 1528  Telemetry - Place Orders & Notify Provider of Results When Patient Experiences Acute Chest Pain, Dysrhythmia or Respiratory Distress  Continuous        Comments: Open Order Report to View Parameters Requiring Provider Notification    08/21/24 1527    08/21/24 1528  NPO Diet NPO Type: Strict NPO  Diet Effective Now,   Status:  Canceled         08/21/24 1527    08/21/24 1527  nitroglycerin (NITROSTAT) SL tablet 0.4 mg  Every 5 Minutes PRN         08/21/24 1527    08/21/24 1526  POC Chem Panel  PROCEDURE ONCE         08/21/24 1519    08/21/24 1526  Blood Gas, Arterial -  PROCEDURE ONCE         08/21/24 1519    08/21/24 1526  POC Lactate  PROCEDURE ONCE         08/21/24 1519    08/21/24 1526  Inpatient Pulmonology Consult  Once        Specialty:  Pulmonary Disease  Provider:  Regis Holcomb,     08/21/24 1526    08/21/24 1525  sennosides-docusate (PERICOLACE) 8.6-50 MG per tablet 2 tablet  2 Times Daily PRN        Placed in \"And\" Linked Group    08/21/24 1525    08/21/24 1525  polyethylene glycol (MIRALAX) packet 17 g  Daily PRN        Placed in \"And\" Linked Group    08/21/24 1525    08/21/24 1525  bisacodyl (DULCOLAX) EC tablet 5 mg  Daily PRN        Placed in \"And\" Linked Group    08/21/24 1525    08/21/24 1525  bisacodyl (DULCOLAX) suppository 10 mg  Daily PRN        Placed in \"And\" Linked Group    08/21/24 1525    08/21/24 1525  Place Sequential Compression Device  Once         08/21/24 1525    08/21/24 1525  Maintain Sequential Compression Device  Continuous         08/21/24 1525    08/21/24 1525  Continuous Cardiac Monitoring  Continuous,   Status:  Canceled        Comments: Follow Standing Orders As Outlined in Process Instructions (Open Order Report to View Full Instructions)    08/21/24 1525    08/21/24 1525  Maintain IV Access  Continuous,   Status:  Canceled         08/21/24 1525    08/21/24 1525  Telemetry - Place Orders & Notify Provider of " Results When Patient Experiences Acute Chest Pain, Dysrhythmia or Respiratory Distress  Continuous        Comments: Open Order Report to View Parameters Requiring Provider Notification    08/21/24 1525    08/21/24 1525  Continuous Pulse Oximetry  Continuous,   Status:  Canceled         08/21/24 1525    08/21/24 1524  nitroglycerin (NITROSTAT) SL tablet 0.4 mg  Every 5 Minutes PRN,   Status:  Discontinued         08/21/24 1525    08/21/24 1524  Notify Provider (With Default Parameters)  Until Discontinued         08/21/24 1525    08/21/24 1524  Intake & Output  Every Shift       08/21/24 1525    08/21/24 1524  Weigh Patient  Once         08/21/24 1525    08/21/24 1524  Insert Peripheral IV  Once         08/21/24 1525    08/21/24 1524  Saline Lock & Maintain IV Access  Continuous,   Status:  Canceled         08/21/24 1525    08/21/24 1524  Inpatient Admission  Once         08/21/24 1525    08/21/24 1524  Code Status and Medical Interventions: CPR (Attempt to Resuscitate); Full Support  Continuous         08/21/24 1525    08/21/24 1523  sodium chloride 0.9 % flush 10 mL  As Needed         08/21/24 1525    08/21/24 1523  sodium chloride 0.9 % infusion 40 mL  As Needed         08/21/24 1525    08/21/24 1523  calcium carbonate (TUMS) chewable tablet 500 mg (200 mg elemental)  2 Times Daily PRN         08/21/24 1525    08/21/24 1523  ondansetron ODT (ZOFRAN-ODT) disintegrating tablet 4 mg  Every 6 Hours PRN         08/21/24 1525    08/21/24 1512  Arterial Blood Gas,H&H,Lytes,Lactate  Once         08/21/24 1511    08/21/24 1511  Basic Metabolic Panel  STAT         08/21/24 1510    08/21/24 1511  XR Chest 1 View  1 Time Imaging         08/21/24 1511    08/21/24 1458  Vital Signs & Pulse Checks  Per Order Details        Comments: Or until discharge.    08/21/24 1459    08/21/24 1458  Monitor for Bleeding or Hematoma  Continuous         08/21/24 1459    08/21/24 1458  Bed Rest  Until Discontinued         08/21/24 1458     08/21/24 1450  POC Glucose Once  PROCEDURE ONCE        Comments: Complete no more than 45 minutes prior to patient eating      08/21/24 1448    08/21/24 1446  etomidate (AMIDATE) injection  Code / Trauma / Sedation Medication         08/21/24 1630    08/21/24 1415  lidocaine 1% - EPINEPHrine 1:899712 (XYLOCAINE W/EPI) 1 %-1:345288 injection  Code / Trauma / Sedation Medication,   Status:  Discontinued         08/21/24 1416    08/21/24 1409  hydrALAZINE (APRESOLINE) injection  Code / Trauma / Sedation Medication,   Status:  Discontinued         08/21/24 1410    08/21/24 1409  Midazolam HCl (PF) (VERSED) injection  Code / Trauma / Sedation Medication,   Status:  Discontinued         08/21/24 1409    08/21/24 1409  fentaNYL citrate (PF) (SUBLIMAZE) injection  Code / Trauma / Sedation Medication,   Status:  Discontinued         08/21/24 1409    08/21/24 1408  ceFAZolin 2000 mg IVPB in 100 mL NS (VTB)  Code / Trauma / Sedation Continuous Med         08/21/24 1409    08/21/24 1256  hydrALAZINE (APRESOLINE) injection 10 mg  Once         08/21/24 1256    08/21/24 1245  ceFAZolin 2000 mg IVPB in 100 mL NS (VTB)  Once         08/21/24 1153    Unscheduled  Oxygen Therapy- Nasal Cannula; Titrate 1-6 LPM Per SpO2; 90 - 95%  Continuous PRN       08/21/24 1527    --  ondansetron (ZOFRAN) 4 MG tablet  Every 8 Hours PRN         08/21/24 1212                  Ventilator/Non-Invasive Ventilation Settings (From admission, onward)       Start     Ordered    08/21/24 1628  Ventilator - Vent Mode: AC/VC; Rate: Other; Rate: 24; FiO2: Titrate Per SpO2; Titrate Oxygen for SpO2: 90% or Greater; PEEP: 5; Tidal Volume: mL; TV: 380  Continuous,   Status:  Canceled        Question Answer Comment   Vent Mode AC/VC    Rate Other    Rate 24    FiO2 Titrate Per SpO2    Titrate Oxygen for SpO2 90% or Greater    PEEP 5    Tidal Volume mL            08/21/24 1628                     Consult Notes (last 24 hours)        Regis Holcomb,   at 24 1626        Consult Orders    1. Inpatient Pulmonology Consult [[id number]] ordered by Miguel Dennsion MD                 [facility]   Consult Note    Patient Name: Charlette Rai  : 1935  MRN: [id number]  Primary Care Physician:  Brennan Mosqueda MD  Referring Physician: Jaden Castañeda MD  Date of admission: 2024    Inpatient Pulmonology Consult  Consult performed by: Regis Holcomb DO  Consult ordered by: Miguel Dennison MD        Subjective   Subjective     Reason for Consult/ Chief Complaint: Reason for consultation critical care management    History of Present Illness  Charlette Rai is a 89 y.o. female critically ill in the ICU  CODE BLUE was called after patient had removal of tunneled dialysis catheter  On arrival to the patient's room she was somnolent and near agonal he breathing  Oxygen saturations were in the 70s  Systolic blood pressure greater than 200    Review of Systems   Unable to give history  Personal History     Past Medical History:   Diagnosis Date    Allergic rhinitis     Anaphylactic shock, unspecified, initial encounter 2023    Anemia     Arthritis     Asthma     USE INHALERS AND NEBULIZERS    Back pain     Bladder disorder     Cancer     LEFT LUNG CANCER  SURGERY AND CHEMO DONE  AND CURRENTLY   RIGHT LUNG CANCER HAS ONLY RECEIVED RADIATION THUS FAR    Chronic kidney disease     stage 3    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 DENIES CP BUT DOES HAVE SOA CHRONIC ISSUE COPD/LUNG CANCER    COPD (chronic obstructive pulmonary disease)     FOLLOWED BY DR MARIAJOSE BAILEY    Coronary artery disease     DENIES CP BUT DOES GET SOA MOST OF THE TIME WITH EXERTION BUT OCC AT REST CHRONIC ISSUE COPD/LUNG CANCER    Deep vein thrombosis     Diabetes mellitus     DOES NOT CHECK  BS DAILY    Disease of thyroid gland     HYPOTHYROIDISM    Essential hypertension     Gastric ulcer     GERD (gastroesophageal reflux disease)     Heart murmur     History of transfusion     NO ISSUES POST TRANSFUSION WAS MANY YEARS AGO    Hyperlipemia     Leukocytopenia     FOLLOWED BY DR MIR BAILEY    Limb swelling     Lumbago     Lumbar spinal stenosis     Lung cancer     Migraine headache     Multiple joint pain     On home oxygen therapy     3L/NC PRN    Osteopenia     PNA (pneumonia) 05/04/2024    Pseudomonas pneumonia 04/29/2024    Reflux esophagitis     Shortness of breath     Thyroid nodule     HAS ULTRASOUND YEARLY BEING MONITORED    Vascular disease     Vitamin D deficiency        Past Surgical History:   Procedure Laterality Date    ABDOMINAL SURGERY      ANGIOPLASTY RENAL ARTERY Left 06/14/2024    stent placement    APPENDECTOMY      ARTERIOGRAM N/A 6/14/2024    Procedure: Arteriogram, right radial access, aortogram and renal arteriogram with possible intervention.;  Surgeon: Dio Miguel MD;  Location: [facility] CATH INVASIVE LOCATION;  Service: Peripheral Vascular;  Laterality: N/A;    ARTERIOVENOUS FISTULA/SHUNT SURGERY Left 05/10/2022    Procedure: Left basilic vein transposition;  Surgeon: Wan Barksdale MD;  Location: [facility] MAIN OR;  Service: Vascular;  Laterality: Left;    ARTERIOVENOUS FISTULA/SHUNT SURGERY Left 03/23/2023    Procedure: Ligation of left arm arteriovenous fistula;  Surgeon: Wan Barksdale MD;  Location: [facility] MAIN OR;  Service: Vascular;  Laterality: Left;    ARTERIOVENOUS FISTULA/SHUNT SURGERY Left 11/30/2023    Procedure: Creation of left arm arteriovenous graft;  Surgeon: Wan Barksdale MD;  Location: [facility] MAIN OR;  Service: Vascular;  Laterality: Left;    BRONCHOSCOPY N/A 04/24/2024    Procedure: BRONCHOSCOPY WITH BAL AND WASHINGS;  Surgeon: Khalif Yanez MD;  Location: [facility] ENDOSCOPY;  Service: Pulmonary;  Laterality: N/A;  MUCUS PLUGGING    CARDIAC CATHETERIZATION   1996    CARDIAC SURGERY      CARDIAC SURGERY      fluid drained from heart    CATARACT EXTRACTION, BILATERAL  2003    COLONOSCOPY  2014    ENDOSCOPY  2016 2019    FEMORAL ARTERY STENT Bilateral     HYSTERECTOMY      LUNG BIOPSY Left 2005    lobectomy upper lung caner    LUNG VOLUME REDUCTION      OTHER SURGICAL HISTORY      artifical joints/limbs    REPLACEMENT TOTAL KNEE Left 2016    UPPER GASTROINTESTINAL ENDOSCOPY         Family History: family history includes Arthritis in her father and mother; Cancer in her brother, brother, brother, and brother; Diabetes in her brother; Other in her brother; Prostate cancer in her brother and brother. Otherwise pertinent FHx was reviewed and not pertinent to current issue.    Social History:  reports that she quit smoking about 20 years ago. Her smoking use included cigarettes. She started smoking about 72 years ago. She has a 52.5 pack-year smoking history. She has been exposed to tobacco smoke. She has never used smokeless tobacco. She reports that she does not drink alcohol and does not use drugs.    Home Medications:   Methylcellulose (Laxative), acetaminophen, albuterol sulfate HFA, amLODIPine, aspirin, budesonide, carvedilol, clopidogrel, dexlansoprazole, furosemide, ipratropium-albuterol, isosorbide mononitrate, levocetirizine, levothyroxine, linagliptin, losartan, nitroglycerin, ondansetron, rosuvastatin, saccharomyces boulardii, sevelamer, spironolactone, tobramycin, and vitamin D    Allergies:  No Known Allergies    Objective    Objective     Vitals:  Temp:  [97.6 °F (36.4 °C)-98.5 °F (36.9 °C)] 98.5 °F (36.9 °C)  Heart Rate:  [87-90] 90  Resp:  [20-24] 24  BP: (120-198)/(43-98) 158/43  Flow (L/min):  [4] 4  FiO2 (%):  [40 %] 40 %    Physical Exam  Chronically ill-appearing elderly female  At the time my exam when I saw her after the code was being called she was agonal he breathing and not responsive  Result Review    Result Review:  I have personally reviewed  the results from the time of this admission to 8/21/2024 16:27 EDT and agree with these findings:  [x]  Laboratory  [x]  Microbiology  [x]  Radiology  [x]  EKG/Telemetry   [x]  Cardiology/Vascular   []  Pathology  []  Old records  []  Other:    Most notable findings include: X-ray showed significant pulmonary edema    Assessment & Plan   Assessment / Plan     Brief Patient Summary:  Charlette Rai is a 89 y.o. 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     Hypoxia    Acute pulmonary edema  Acute on chronic hypoxic and hypercapnic respiratory failure  Hypertensive emergency with systolic blood pressures greater than 200-just preceding intubation blood pressure was 206/98  Hyperkalemia    Plan:   Patient intubated emergently due to respiratory failure  Patient on assist-control tidal volume of 380, respiratory rate of 18, PEEP of 5  Nephrology consulted  To receive dialysis today  Can assess patient after hemodialysis to see about extubation  Propofol and fentanyl for sedation  No antibiotics needed at this time    Patient critically ill in the ICU with respiratory failure    Total critical care time spent 32 minutes      Electronically signed by Regis Holcomb DO, 08/21/24, 4:27 PM EDT.     Electronically signed by Regis Holcomb DO at 08/21/24 [id number]

## 2024-08-22 NOTE — NURSING NOTE
Duration of Treatment 4.0 Hours   Access Site AVG   Dialyzer Revaclear    mL/min   Dialysate Temperature (C) 36   BFR-As tolerated to a maximum of: 400 mL/min   Prime Dialyzer With NS to Priming Volume? Yes   Dialysate Solution Bath: K+ 2 mEq, Ca 2.5mEq   Bicarb Other   Bicarb Comments 37   Na+ Other   Na+ comments 138   Fluid Removal: 4kg     Pt completed entire 4 hour STAT tx with no issues. Removed 4L. Post /41.     Report given to Shante KHAN.

## 2024-08-22 NOTE — PROGRESS NOTES
" LOS: 1 day   Patient Care Team:  Brennan Mosqueda MD as PCP - General (Internal Medicine)  Regis Mckeon MD as Consulting Physician (Radiation Oncology)  Carlton Casillas MD as Consulting Physician (Gastroenterology)  Susan Marcos APRN as Nurse Practitioner (Gastroenterology)    Chief Complaint: ESRD    Subjective     History of Present Illness  Pt just extubated this AM  No acute complaints  TDC out yesterday    Subjective    History taken from: patient chart RN    Objective     Vital Sign Min/Max for last 24 hours  Temp  Min: 97 °F (36.1 °C)  Max: 98.5 °F (36.9 °C)   BP  Min: 86/76  Max: 198/55   Pulse  Min: 68  Max: 90   Resp  Min: 20  Max: 24   SpO2  Min: 20 %  Max: 100 %   Flow (L/min)  Min: 4  Max: 4   Weight  Min: 65.9 kg (145 lb 4.5 oz)  Max: 65.9 kg (145 lb 4.5 oz)     Flowsheet Rows      Flowsheet Row First Filed Value   Admission Height 165.1 cm (65\") Documented at 08/21/2024 1154   Admission Weight 65.9 kg (145 lb 4.5 oz) Documented at 08/21/2024 1154            I/O this shift:  In: -   Out: 10 [Urine:10]  I/O last 3 completed shifts:  In: 432.4 [I.V.:432.4]  Out: 4165 [Urine:165]    Objective:  General Appearance:  Comfortable.    Vital signs: (most recent): Blood pressure 99/75, pulse 86, temperature 98.4 °F (36.9 °C), resp. rate 24, height 165.1 cm (65\"), weight 65.9 kg (145 lb 4.5 oz), SpO2 97%, not currently breastfeeding.  Vital signs are normal.    HEENT: Normal HEENT exam.    Lungs:  Normal effort and normal respiratory rate.    Heart: Normal rate.  Regular rhythm.    Abdomen: Abdomen is soft.  Bowel sounds are normal.   There is no abdominal tenderness.     Extremities: Normal range of motion.  There is no dependent edema.    Pulses: Distal pulses are intact.    Neurological: Patient is alert and oriented to person, place and time.    Pupils:  Pupils are equal, round, and reactive to light.    Skin:  Warm and dry.                Results Review:     I reviewed the " "patient's new clinical results.  I reviewed the patient's new imaging results and agree with the interpretation.  I reviewed the patient's other test results and agree with the interpretation    WBC WBC   Date Value Ref Range Status   08/22/2024 11.88 (H) 3.40 - 10.80 10*3/mm3 Final   08/21/2024 8.00 3.40 - 10.80 10*3/mm3 Final      HGB Hemoglobin   Date Value Ref Range Status   08/22/2024 11.3 (L) 12.0 - 15.9 g/dL Final   08/21/2024 10.1 (L) 12.0 - 15.9 g/dL Final      HCT Hematocrit   Date Value Ref Range Status   08/22/2024 39.5 34.0 - 46.6 % Final   08/21/2024 34.0 34.0 - 46.6 % Final      Platlets No results found for: \"LABPLAT\"   MCV MCV   Date Value Ref Range Status   08/22/2024 104.8 (H) 79.0 - 97.0 fL Final   08/21/2024 101.8 (H) 79.0 - 97.0 fL Final          Sodium Sodium   Date Value Ref Range Status   08/22/2024 136 136 - 145 mmol/L Final   08/21/2024 135 (L) 136 - 145 mmol/L Final   08/21/2024 135 (L) 136 - 145 mmol/L Final      Potassium Potassium   Date Value Ref Range Status   08/22/2024 4.9 3.5 - 5.2 mmol/L Final     Comment:     Slight hemolysis detected by analyzer. Result may be falsely elevated.   08/21/2024 5.5 (H) 3.5 - 5.2 mmol/L Final   08/21/2024 5.0 3.5 - 5.2 mmol/L Final      Chloride Chloride   Date Value Ref Range Status   08/22/2024 97 (L) 98 - 107 mmol/L Final   08/21/2024 96 (L) 98 - 107 mmol/L Final   08/21/2024 94 (L) 98 - 107 mmol/L Final      CO2 CO2   Date Value Ref Range Status   08/22/2024 29.0 22.0 - 29.0 mmol/L Final   08/21/2024 31.7 (H) 22.0 - 29.0 mmol/L Final   08/21/2024 34.3 (H) 22.0 - 29.0 mmol/L Final      BUN BUN   Date Value Ref Range Status   08/22/2024 13 8 - 23 mg/dL Final   08/21/2024 23 8 - 23 mg/dL Final   08/21/2024 23 8 - 23 mg/dL Final      Creatinine Creatinine   Date Value Ref Range Status   08/22/2024 2.29 (H) 0.57 - 1.00 mg/dL Final   08/21/2024 3.45 (H) 0.60 - 1.30 mg/dL Final   08/21/2024 3.46 (H) 0.57 - 1.00 mg/dL Final   08/21/2024 3.18 (H) 0.57 - " "1.00 mg/dL Final      Calcium Calcium   Date Value Ref Range Status   08/22/2024 8.6 8.6 - 10.5 mg/dL Final   08/21/2024 8.7 8.6 - 10.5 mg/dL Final   08/21/2024 8.4 (L) 8.6 - 10.5 mg/dL Final      PO4 No results found for: \"CAPO4\"   Albumin No results found for: \"ALBUMIN\"   Magnesium Magnesium   Date Value Ref Range Status   08/22/2024 1.8 1.6 - 2.4 mg/dL Final      Uric Acid No results found for: \"URICACID\"     Medication Review:   heparin (porcine), 5,000 Units, Subcutaneous, Q12H  hydrALAZINE, 10 mg, Intravenous, Once  sodium chloride, 10 mL, Intravenous, Q12H          Assessment & Plan       ESRD (end stage renal disease) on dialysis    Hypoxia      Assessment & Plan  - ESRD with pulmonary edema, severe hypertension and mild hyperkalemia.  Had stat dialysis yesterday. Using left upper extremity AV graft.  TDC removed.  Continue m/w/f.    - Acute respiratory acidosis, possibly related to sedatives from earlier procedure.  Extubated now.    - Severe hypertension in the setting of acute pulmonary edema, she has prior history of renal artery stenosis, status post stenting.  Blood pressure should improve with ultrafiltration.  Resume blood pressure medications.    - Anemia of CKD.  On long-acting CASSANDRA as outpatient.    - Hypothyroidism, per primary.    Elliott Aviles MD  08/22/24  10:52 EDT          "

## 2024-08-22 NOTE — PLAN OF CARE
Goal Outcome Evaluation:  Plan of Care Reviewed With: patient        Progress: no change  Outcome Evaluation: Patient is currently on the ventilator. Patient was placed on PS after dialysis.  She is on PS 10 and PEEP 5. She is currently tolerating PS very well.

## 2024-08-22 NOTE — SIGNIFICANT NOTE
08/22/24 1231   Spiritual Care   Use of Spiritual Resources non-Latter-day use of spiritual care   Spiritual Care Source  initiative   Spiritual Care Follow-Up follow-up planned regularly for general support   Response to Spiritual Care emotion expressed;engaged in conversation;thanks expressed   Spiritual Care Interventions supportive conversation provided   Spiritual Care Visit Type initial   Spiritual Care Request coping/stress of illness support;family support;spiritual/moral support   Receptivity to Spiritual Care visit welcomed

## 2024-08-23 LAB
ALBUMIN SERPL-MCNC: 2.7 G/DL (ref 3.5–5.2)
ALBUMIN SERPL-MCNC: 2.7 G/DL (ref 3.5–5.2)
ALBUMIN/GLOB SERPL: 0.9 G/DL
ALBUMIN/GLOB SERPL: 1 G/DL
ALP SERPL-CCNC: 172 U/L (ref 39–117)
ALP SERPL-CCNC: 184 U/L (ref 39–117)
ALT SERPL W P-5'-P-CCNC: 6 U/L (ref 1–33)
ALT SERPL W P-5'-P-CCNC: 7 U/L (ref 1–33)
ANION GAP SERPL CALCULATED.3IONS-SCNC: 8.4 MMOL/L (ref 5–15)
ANION GAP SERPL CALCULATED.3IONS-SCNC: 9.1 MMOL/L (ref 5–15)
AST SERPL-CCNC: 17 U/L (ref 1–32)
AST SERPL-CCNC: 20 U/L (ref 1–32)
BILIRUB SERPL-MCNC: 0.3 MG/DL (ref 0–1.2)
BILIRUB SERPL-MCNC: 0.3 MG/DL (ref 0–1.2)
BUN SERPL-MCNC: 22 MG/DL (ref 8–23)
BUN SERPL-MCNC: 26 MG/DL (ref 8–23)
BUN/CREAT SERPL: 7.1 (ref 7–25)
BUN/CREAT SERPL: 8 (ref 7–25)
CALCIUM SPEC-SCNC: 8.1 MG/DL (ref 8.6–10.5)
CALCIUM SPEC-SCNC: 8.5 MG/DL (ref 8.6–10.5)
CHLORIDE SERPL-SCNC: 97 MMOL/L (ref 98–107)
CHLORIDE SERPL-SCNC: 98 MMOL/L (ref 98–107)
CO2 SERPL-SCNC: 26.9 MMOL/L (ref 22–29)
CO2 SERPL-SCNC: 29.6 MMOL/L (ref 22–29)
CREAT SERPL-MCNC: 3.12 MG/DL (ref 0.57–1)
CREAT SERPL-MCNC: 3.25 MG/DL (ref 0.57–1)
DEPRECATED RDW RBC AUTO: 53.9 FL (ref 37–54)
EGFRCR SERPLBLD CKD-EPI 2021: 13.3 ML/MIN/1.73
EGFRCR SERPLBLD CKD-EPI 2021: 13.9 ML/MIN/1.73
ERYTHROCYTE [DISTWIDTH] IN BLOOD BY AUTOMATED COUNT: 14.2 % (ref 12.3–15.4)
GLOBULIN UR ELPH-MCNC: 2.7 GM/DL
GLOBULIN UR ELPH-MCNC: 3 GM/DL
GLUCOSE BLDC GLUCOMTR-MCNC: 109 MG/DL (ref 70–99)
GLUCOSE BLDC GLUCOMTR-MCNC: 117 MG/DL (ref 70–99)
GLUCOSE BLDC GLUCOMTR-MCNC: 65 MG/DL (ref 70–99)
GLUCOSE BLDC GLUCOMTR-MCNC: 87 MG/DL (ref 70–99)
GLUCOSE BLDC GLUCOMTR-MCNC: 97 MG/DL (ref 70–99)
GLUCOSE BLDC GLUCOMTR-MCNC: 97 MG/DL (ref 70–99)
GLUCOSE SERPL-MCNC: 139 MG/DL (ref 65–99)
GLUCOSE SERPL-MCNC: 89 MG/DL (ref 65–99)
HBV SURFACE AG SERPL QL IA: NORMAL
HCT VFR BLD AUTO: 32.1 % (ref 34–46.6)
HGB BLD-MCNC: 9.2 G/DL (ref 12–15.9)
MAGNESIUM SERPL-MCNC: 1.8 MG/DL (ref 1.6–2.4)
MAGNESIUM SERPL-MCNC: 2.5 MG/DL (ref 1.6–2.4)
MCH RBC QN AUTO: 29.8 PG (ref 26.6–33)
MCHC RBC AUTO-ENTMCNC: 28.7 G/DL (ref 31.5–35.7)
MCV RBC AUTO: 103.9 FL (ref 79–97)
PHOSPHATE SERPL-MCNC: 3.6 MG/DL (ref 2.5–4.5)
PLATELET # BLD AUTO: 127 10*3/MM3 (ref 140–450)
PMV BLD AUTO: 10.2 FL (ref 6–12)
POTASSIUM SERPL-SCNC: 4.4 MMOL/L (ref 3.5–5.2)
POTASSIUM SERPL-SCNC: 4.6 MMOL/L (ref 3.5–5.2)
PROT SERPL-MCNC: 5.4 G/DL (ref 6–8.5)
PROT SERPL-MCNC: 5.7 G/DL (ref 6–8.5)
RBC # BLD AUTO: 3.09 10*6/MM3 (ref 3.77–5.28)
SODIUM SERPL-SCNC: 133 MMOL/L (ref 136–145)
SODIUM SERPL-SCNC: 136 MMOL/L (ref 136–145)
WBC NRBC COR # BLD AUTO: 8.55 10*3/MM3 (ref 3.4–10.8)

## 2024-08-23 PROCEDURE — 97165 OT EVAL LOW COMPLEX 30 MIN: CPT

## 2024-08-23 PROCEDURE — 94799 UNLISTED PULMONARY SVC/PX: CPT

## 2024-08-23 PROCEDURE — 84100 ASSAY OF PHOSPHORUS: CPT | Performed by: PHYSICIAN ASSISTANT

## 2024-08-23 PROCEDURE — 80053 COMPREHEN METABOLIC PANEL: CPT | Performed by: PHYSICIAN ASSISTANT

## 2024-08-23 PROCEDURE — 94664 DEMO&/EVAL PT USE INHALER: CPT

## 2024-08-23 PROCEDURE — 85027 COMPLETE CBC AUTOMATED: CPT | Performed by: PHYSICIAN ASSISTANT

## 2024-08-23 PROCEDURE — 83735 ASSAY OF MAGNESIUM: CPT | Performed by: PHYSICIAN ASSISTANT

## 2024-08-23 PROCEDURE — 25010000002 HEPARIN (PORCINE) PER 1000 UNITS: Performed by: SURGERY

## 2024-08-23 PROCEDURE — 82948 REAGENT STRIP/BLOOD GLUCOSE: CPT | Performed by: PHYSICIAN ASSISTANT

## 2024-08-23 PROCEDURE — 82948 REAGENT STRIP/BLOOD GLUCOSE: CPT

## 2024-08-23 PROCEDURE — 25010000002 MAGNESIUM SULFATE 2 GM/50ML SOLUTION: Performed by: PHYSICIAN ASSISTANT

## 2024-08-23 PROCEDURE — 97161 PT EVAL LOW COMPLEX 20 MIN: CPT

## 2024-08-23 PROCEDURE — 87340 HEPATITIS B SURFACE AG IA: CPT | Performed by: INTERNAL MEDICINE

## 2024-08-23 PROCEDURE — 25010000002 HYDROCORTISONE SOD SUC (PF) 100 MG RECONSTITUTED SOLUTION: Performed by: PHYSICIAN ASSISTANT

## 2024-08-23 PROCEDURE — 99232 SBSQ HOSP IP/OBS MODERATE 35: CPT | Performed by: INTERNAL MEDICINE

## 2024-08-23 PROCEDURE — 99233 SBSQ HOSP IP/OBS HIGH 50: CPT | Performed by: INTERNAL MEDICINE

## 2024-08-23 RX ORDER — MAGNESIUM SULFATE HEPTAHYDRATE 40 MG/ML
2 INJECTION, SOLUTION INTRAVENOUS ONCE
Status: COMPLETED | OUTPATIENT
Start: 2024-08-23 | End: 2024-08-23

## 2024-08-23 RX ADMIN — Medication 10 ML: at 22:39

## 2024-08-23 RX ADMIN — ASPIRIN 81 MG: 81 TABLET, COATED ORAL at 08:45

## 2024-08-23 RX ADMIN — CARVEDILOL 12.5 MG: 12.5 TABLET, FILM COATED ORAL at 08:45

## 2024-08-23 RX ADMIN — LEVOTHYROXINE SODIUM 25 MCG: 0.03 TABLET ORAL at 05:23

## 2024-08-23 RX ADMIN — BUDESONIDE 0.5 MG: 0.5 SUSPENSION RESPIRATORY (INHALATION) at 06:59

## 2024-08-23 RX ADMIN — BUDESONIDE 0.5 MG: 0.5 SUSPENSION RESPIRATORY (INHALATION) at 19:30

## 2024-08-23 RX ADMIN — HYDROCORTISONE SODIUM SUCCINATE 50 MG: 100 INJECTION, POWDER, FOR SOLUTION INTRAMUSCULAR; INTRAVENOUS at 22:39

## 2024-08-23 RX ADMIN — CLOPIDOGREL BISULFATE 75 MG: 75 TABLET ORAL at 08:45

## 2024-08-23 RX ADMIN — Medication 10 ML: at 08:47

## 2024-08-23 RX ADMIN — HEPARIN SODIUM 5000 UNITS: 5000 INJECTION INTRAVENOUS; SUBCUTANEOUS at 08:44

## 2024-08-23 RX ADMIN — HEPARIN SODIUM 5000 UNITS: 5000 INJECTION INTRAVENOUS; SUBCUTANEOUS at 22:40

## 2024-08-23 RX ADMIN — MAGNESIUM SULFATE HEPTAHYDRATE 2 G: 40 INJECTION, SOLUTION INTRAVENOUS at 08:44

## 2024-08-23 RX ADMIN — IPRATROPIUM BROMIDE AND ALBUTEROL SULFATE 3 ML: .5; 3 SOLUTION RESPIRATORY (INHALATION) at 01:45

## 2024-08-23 NOTE — PLAN OF CARE
Goal Outcome Evaluation:  Plan of Care Reviewed With: patient        Progress: no change  Outcome Evaluation: Patient presents with limitations in self-care, functional transfers, balance, and endurance. She would benefit from continued skilled occupational therapy services to maximize independence with ADLs/functional transfers.      Anticipated Discharge Disposition (OT): home with home health, home with assist

## 2024-08-23 NOTE — PROGRESS NOTES
" LOS: 2 days   Patient Care Team:  Brennan Mosqueda MD as PCP - General (Internal Medicine)  Regis Mckeon MD as Consulting Physician (Radiation Oncology)  Carlton Casillas MD as Consulting Physician (Gastroenterology)  Susan Marcos APRN as Nurse Practitioner (Gastroenterology)    Chief Complaint: ESRD    Subjective     History of Present Illness  Pt seen earlier this morning, feeling tired but no particular other complaints.  Dressing over site of right IJ TDC is slightly soaked but covered and clean.  No respiratory distress.    Subjective:  Symptoms:  Improved.  She reports weakness.  No shortness of breath or chest pain.    Diet:  No nausea or vomiting.    Activity level: Impaired due to weakness.    Pain:  She reports no pain.        History taken from: patient chart RN    Objective     Vital Sign Min/Max for last 24 hours  Temp  Min: 98 °F (36.7 °C)  Max: 99.7 °F (37.6 °C)   BP  Min: 92/59  Max: 183/61   Pulse  Min: 71  Max: 99   Resp  Min: 14  Max: 18   SpO2  Min: 89 %  Max: 98 %   Flow (L/min)  Min: 3  Max: 3   No data recorded     Flowsheet Rows      Flowsheet Row First Filed Value   Admission Height 165.1 cm (65\") Documented at 08/21/2024 1154   Admission Weight 65.9 kg (145 lb 4.5 oz) Documented at 08/21/2024 1154            No intake/output data recorded.  I/O last 3 completed shifts:  In: 341.3 [I.V.:341.3]  Out: 4100 [Urine:100]    Objective:  General Appearance:  Comfortable, in no acute distress and not in pain.    Vital signs: (most recent): Blood pressure 166/61, pulse 86, temperature 98 °F (36.7 °C), temperature source Oral, resp. rate 16, height 165.1 cm (65\"), weight 65.9 kg (145 lb 4.5 oz), SpO2 94%, not currently breastfeeding.  Vital signs are normal.  No fever.    HEENT: Normal HEENT exam.    Lungs:  Normal effort and normal respiratory rate.  She is not in respiratory distress.    Heart: Normal rate.  Regular rhythm.    Abdomen: Abdomen is soft.  Bowel sounds are " "normal.   There is no abdominal tenderness.     Extremities: Normal range of motion.  There is no dependent edema.  (Left upper extremity AV graft.  Patent.)  Pulses: Distal pulses are intact.    Neurological: Patient is alert and oriented to person, place and time.    Pupils:  Pupils are equal, round, and reactive to light.    Skin:  Warm and dry.                Results Review:     I reviewed the patient's new clinical results.  I reviewed the patient's new imaging results and agree with the interpretation.  I reviewed the patient's other test results and agree with the interpretation    WBC WBC   Date Value Ref Range Status   08/23/2024 8.55 3.40 - 10.80 10*3/mm3 Final   08/22/2024 11.88 (H) 3.40 - 10.80 10*3/mm3 Final   08/21/2024 8.00 3.40 - 10.80 10*3/mm3 Final      HGB Hemoglobin   Date Value Ref Range Status   08/23/2024 9.2 (L) 12.0 - 15.9 g/dL Final   08/22/2024 11.3 (L) 12.0 - 15.9 g/dL Final   08/21/2024 10.1 (L) 12.0 - 15.9 g/dL Final      HCT Hematocrit   Date Value Ref Range Status   08/23/2024 32.1 (L) 34.0 - 46.6 % Final   08/22/2024 39.5 34.0 - 46.6 % Final   08/21/2024 34.0 34.0 - 46.6 % Final      Platlets No results found for: \"LABPLAT\"   MCV MCV   Date Value Ref Range Status   08/23/2024 103.9 (H) 79.0 - 97.0 fL Final   08/22/2024 104.8 (H) 79.0 - 97.0 fL Final   08/21/2024 101.8 (H) 79.0 - 97.0 fL Final          Sodium Sodium   Date Value Ref Range Status   08/23/2024 133 (L) 136 - 145 mmol/L Final   08/23/2024 136 136 - 145 mmol/L Final   08/22/2024 136 136 - 145 mmol/L Final   08/21/2024 135 (L) 136 - 145 mmol/L Final   08/21/2024 135 (L) 136 - 145 mmol/L Final      Potassium Potassium   Date Value Ref Range Status   08/23/2024 4.6 3.5 - 5.2 mmol/L Final   08/23/2024 4.4 3.5 - 5.2 mmol/L Final   08/22/2024 4.9 3.5 - 5.2 mmol/L Final     Comment:     Slight hemolysis detected by analyzer. Result may be falsely elevated.   08/21/2024 5.5 (H) 3.5 - 5.2 mmol/L Final   08/21/2024 5.0 3.5 - 5.2 " "mmol/L Final      Chloride Chloride   Date Value Ref Range Status   08/23/2024 97 (L) 98 - 107 mmol/L Final   08/23/2024 98 98 - 107 mmol/L Final   08/22/2024 97 (L) 98 - 107 mmol/L Final   08/21/2024 96 (L) 98 - 107 mmol/L Final   08/21/2024 94 (L) 98 - 107 mmol/L Final      CO2 CO2   Date Value Ref Range Status   08/23/2024 26.9 22.0 - 29.0 mmol/L Final   08/23/2024 29.6 (H) 22.0 - 29.0 mmol/L Final   08/22/2024 29.0 22.0 - 29.0 mmol/L Final   08/21/2024 31.7 (H) 22.0 - 29.0 mmol/L Final   08/21/2024 34.3 (H) 22.0 - 29.0 mmol/L Final      BUN BUN   Date Value Ref Range Status   08/23/2024 26 (H) 8 - 23 mg/dL Final   08/23/2024 22 8 - 23 mg/dL Final   08/22/2024 13 8 - 23 mg/dL Final   08/21/2024 23 8 - 23 mg/dL Final   08/21/2024 23 8 - 23 mg/dL Final      Creatinine Creatinine   Date Value Ref Range Status   08/23/2024 3.25 (H) 0.57 - 1.00 mg/dL Final   08/23/2024 3.12 (H) 0.57 - 1.00 mg/dL Final   08/22/2024 2.29 (H) 0.57 - 1.00 mg/dL Final   08/21/2024 3.45 (H) 0.60 - 1.30 mg/dL Final   08/21/2024 3.46 (H) 0.57 - 1.00 mg/dL Final   08/21/2024 3.18 (H) 0.57 - 1.00 mg/dL Final      Calcium Calcium   Date Value Ref Range Status   08/23/2024 8.1 (L) 8.6 - 10.5 mg/dL Final   08/23/2024 8.5 (L) 8.6 - 10.5 mg/dL Final   08/22/2024 8.6 8.6 - 10.5 mg/dL Final   08/21/2024 8.7 8.6 - 10.5 mg/dL Final   08/21/2024 8.4 (L) 8.6 - 10.5 mg/dL Final      PO4 No results found for: \"CAPO4\"   Albumin Albumin   Date Value Ref Range Status   08/23/2024 2.7 (L) 3.5 - 5.2 g/dL Final   08/23/2024 2.7 (L) 3.5 - 5.2 g/dL Final      Magnesium Magnesium   Date Value Ref Range Status   08/23/2024 2.5 (H) 1.6 - 2.4 mg/dL Final   08/23/2024 1.8 1.6 - 2.4 mg/dL Final   08/22/2024 1.8 1.6 - 2.4 mg/dL Final      Uric Acid No results found for: \"URICACID\"     Medication Review:   aspirin, 81 mg, Oral, Daily  budesonide, 0.5 mg, Nebulization, BID - RT  carvedilol, 12.5 mg, Oral, BID With Meals  clopidogrel, 75 mg, Oral, Daily  heparin (porcine), " 5,000 Units, Subcutaneous, Q12H  hydrALAZINE, 10 mg, Intravenous, Once  hydrocortisone sodium succinate, 50 mg, Intravenous, Q8H  levothyroxine, 25 mcg, Oral, Q AM  sodium chloride, 10 mL, Intravenous, Q12H  spironolactone, 25 mg, Oral, Every Other Day          Assessment & Plan       ESRD (end stage renal disease) on dialysis    Hypoxia      Assessment & Plan  - ESRD with pulmonary edema, severe hypertension and mild hyperkalemia.  Had stat dialysis on Wednesday through left upper extremity AV graft.  TDC removed.  Continue m/w/f.  Dialysis today.    - Acute respiratory acidosis, possibly related to sedatives from earlier procedure.  Better, extubated.  Hemodynamically stable.    - Severe hypertension in the setting of acute pulmonary edema, she has prior history of renal artery stenosis, status post stenting.  Blood pressure is better.  Avoid hypotension.  Monitor.    - Anemia of CKD.  On long-acting CASSANDRA as outpatient.    - Hypothyroidism, per primary.    - Hypoglycemia, etiology is uncertain, discussed with pulmonary.    Will follow.    Edi García MD  08/23/24  16:19 EDT

## 2024-08-23 NOTE — PROGRESS NOTES
[facility]   Hospitalist Progress Note  Date: 2024  Patient Name: Charlette Rai  : 1935  MRN: [id number]  Date of admission: 2024  Room/Bed: [facility]      Subjective   Subjective     Chief Complaint:   Consulted by vascular surgery, hypoxia following patient's tunneled dialysis catheter removal    Summary:  Charlette Rai is an 89 y.o. female with medical history of GERD, end-stage renal disease, coronary artery disease, peripheral vascular disease, hypertension, heart failure with preserved ejection fraction, renal artery stenosis, hypothyroidism, COPD chronically on 3 L nasal cannula     The patient presented to the hospital on 2024 for an elective tunneled dialysis catheter removal.  Patient with end-stage renal disease, had been using a right IJ tunneled dialysis catheter while her left upper extremity axial axilla AV loop graft was being optimized after pulling clots for short period of time.  Patient presented for removal of tunneled dialysis catheter, plan was to go to dialysis immediately following for a session.  Following patient's procedure patient became more somnolent, noted to be hypoxic on her 3 L nasal cannula with agonal breathing.  CODE BLUE was called, was told she never lost pulses.  Patient was intubated for airway protection.  Documentation also indicates patient had been hypertensive during her procedure with systolics greater than 200.  Following intubation patient was transferred to the ICU for ongoing care and management.  Pulmonary critical care consulted.  Patient's nephrologist also alerted.  Patient underwent dialysis with 4 L removal overnight with improvement in oxygenation.     Interval Followup:   Patient resting comfortably in bed answering questions appropriately.  Endorses weakness today.  Patient being seen by vascular surgery and adjusting dressing change.  Patient during this time had an 18 beat run of V. tach, asymptomatic.  Will have cardiology  see patient while in house..    Objective   Objective     Vitals:   Temp:  [98 °F (36.7 °C)-99.7 °F (37.6 °C)] 98 °F (36.7 °C)  Heart Rate:  [71-99] 90  Resp:  [14-18] 16  BP: ()/() 149/61  Flow (L/min):  [3] 3    Physical Exam               Constitutional: No acute distress, resting comfortably in bed answering questions appropriately              Eyes: Pupils equal, sclerae anicteric, no conjunctival injection              HENT: NCAT, mucous membranes moist              Neck: Supple, no thyromegaly, no lymphadenopathy, trachea midline              Respiratory: Clear to auscultation bilaterally, no wheezing              Cardiovascular: RRR, no murmurs, rubs, or gallops, palpable pedal pulses bilaterally              Gastrointestinal: Positive bowel sounds, soft, nondistended              Musculoskeletal: No bilateral ankle edema, no clubbing or cyanosis to extremities              Neurologic: Radial nerves grossly intact, awake alert and oriented.  Kyaw symmetric throughout       Result Review    Result Review:  I have personally reviewed these results:  [x]  Laboratory      Lab 08/23/24  0233 08/22/24  0636 08/22/24  0532 08/21/24  1519 08/21/24  1152   WBC 8.55  --  11.88*  --  8.00   HEMOGLOBIN 9.2*  --  11.3*  --  10.1*   HEMATOCRIT 32.1*  --  39.5  --  34.0   PLATELETS 127*  --  124*  --  154   NEUTROS ABS  --   --  8.80*  --  6.57   IMMATURE GRANS (ABS)  --   --  0.06*  --  0.03   LYMPHS ABS  --   --  0.87  --  0.60*   MONOS ABS  --   --  1.89*  --  0.71   EOS ABS  --   --  0.18  --  0.06   .9*  --  104.8*  --  101.8*   LACTATE  --   --   --  <0.4  --    PROTIME  --  15.1*  --   --  14.6   APTT  --  31.9  --   --  28.7         Lab 08/23/24  1209 08/23/24  0233 08/22/24  0532   SODIUM 133* 136 136   POTASSIUM 4.6 4.4 4.9   CHLORIDE 97* 98 97*   CO2 26.9 29.6* 29.0   ANION GAP 9.1 8.4 10.0   BUN 26* 22 13   CREATININE 3.25* 3.12* 2.29*   EGFR 13.3* 13.9* 20.2*   GLUCOSE 139* 89 62*   CALCIUM  8.1* 8.5* 8.6   MAGNESIUM 2.5* 1.8 1.8   PHOSPHORUS  --  3.6 3.5         Lab 08/23/24  1209 08/23/24  0233   TOTAL PROTEIN 5.7* 5.4*   ALBUMIN 2.7* 2.7*   GLOBULIN 3.0 2.7   ALT (SGPT) 6 7   AST (SGOT) 20 17   BILIRUBIN 0.3 0.3   ALK PHOS 184* 172*         Lab 08/22/24  0636 08/21/24  1701 08/21/24  1516 08/21/24  1152   PROBNP  --   --  30,396.0*  --    HSTROP T  --  58* 55*  --    PROTIME 15.1*  --   --  14.6   INR 1.16*  --   --  1.12                 Lab 08/21/24  1519   PH, ARTERIAL 7.311*   PCO2, ARTERIAL 75.4*   PO2 .6*   O2 SATURATION .0*   FIO2 100   HCO3 ART 38.0*   BASE EXCESS ART 9.5*     Brief Urine Lab Results  (Last result in the past 365 days)        Color   Clarity   Blood   Leuk Est   Nitrite   Protein   CREAT   Urine HCG        05/01/24 2102 Dark Yellow   Cloudy   Negative   Small (1+)   Negative   >=300 mg/dL (3+)                 [x]  Microbiology   Microbiology Results (last 10 days)       ** No results found for the last 240 hours. **          [x]  Radiology  XR Chest 1 View    Result Date: 8/21/2024  Impression: 1.Tip of the endotracheal tube terminates in the upper thoracic trachea. The rebecca is not well visualized. 2.Diffusely increased bilateral airspace opacities, concerning for pulmonary edema or possibly multifocal pneumonia. 3.Stable bilateral scarring with left pleural plaques. Electronically Signed: Tanvi Alvarez  8/21/2024 3:46 PM EDT  Workstation ID: [id number]   []  EKG/Telemetry   []  Cardiology/Vascular   []  Pathology  []  Old records  []  Other:    Assessment & Plan   Assessment / Plan     Assessment:  Acute on chronic hypoxic and hypercapnic respiratory failure  Pulmonary edema  Nonsustained V. tach  Hypertensive emergency with systolics greater than 200  End-stage renal disease on dialysis  GERD  CAD  Peripheral vascular disease  Heart failure with preserved ejection fraction  Renal artery stenosis  Hypothyroidism  COPD chronically on 3 L nasal cannula      Plan:  Patient admitted to the hospital for further care and management  Pulmonary critical care, nephrology, vascular surgery following, appreciate assistance  Consulting cardiology today for NSVT  Continues on dialysis schedule per nephrology  Main stable on home 3 L nasal cannula following extubation  Improvement in hypoglycemia.  Weaned off D5.  Will continue to monitor glucoses every 6 hours.  Starting patient on low-dose hydrocortisone for hyperglycemia, will monitor response     Discussed case with bedside nurse, pulmonologist, nephrologist, vascular surgeon, cardiology       VTE Prophylaxis:  Pharmacologic & mechanical VTE prophylaxis orders are present.        CODE STATUS:   Code Status (Patient has no pulse and is not breathing): CPR (Attempt to Resuscitate)  Medical Interventions (Patient has pulse or is breathing): Full Support      Electronically signed by Miguel Dennison MD, 8/23/2024, 18:18 EDT.

## 2024-08-23 NOTE — NURSING NOTE
Duration of Treatment 4.0 Hours               completed   Access Site AVG   Dialyzer Revaclear    mL/min   Dialysate Temperature (C) 36   BFR-As tolerated to a maximum of: 400 mL/min   Prime Dialyzer With NS to Priming Volume? Yes   Dialysate Solution Bath: K+ = 2 mEq, Ca = 2.5mEq   Bicarb 35 mEq   Na+ 137 meq   Fluid Removal: 3L           2700  removed       Four hour treatment completed, pt tolerated well.  2700 mL removed vs 3L due to pt cramping.  Post /66, HR 90.  Report given to charge nurse Abdi RN on 4 MTU.

## 2024-08-23 NOTE — PROGRESS NOTES
[facility]     Progress Note    Patient Name: Charlette Rai  : 1935  MRN: [id number]  Primary Care Physician:  Brennan Mosqueda MD  Date of admission: 2024    Subjective   Subjective     Pt seen and doing okay this AM.  Up to chair and looks improved.  Had an asymptomatic run of V-tach noted after her bath and getting up to chair.    Objective   Objective     Vitals:   Temp:  [98.2 °F (36.8 °C)-99.7 °F (37.6 °C)] 98.2 °F (36.8 °C)  Heart Rate:  [72-99] 73  Resp:  [14-18] 16  BP: ()/() 98/86  Flow (L/min):  [3] 3    Physical Exam   Constitutional: Awake, alert, NAD   Neck: Supple   Respiratory: Clear to auscultation bilaterally, nonlabored respirations    Cardiovascular: Irregular, RR   Abdomen: benign   Extremities: LUE AVG with + thrill and bruit    Assessment & Plan   Assessment / Plan     Assessment/Plan:    89 yo CF S/p TDC removal as has a functioning LUE AVG who suffered acute respiratory failure requiring intubation from fluid overload and possible pulmonary edema.  2.  History of recent renal artery stent in .   3. Extubated after 1 night of intubation and dialysis  and now doing better.  4. Had asymptomatic run of V-tach this AM after getting up to chair and being bathed.  Primary notified and cards will be consulted.  5.  Will see as needed.  May call back with any questions or concerns.    Active Hospital Problems:  Active Hospital Problems    Diagnosis     **ESRD (end stage renal disease) on dialysis     Hypoxia            Electronically signed by Jaden Castañeda MD, 24, 12:21 PM EDT.

## 2024-08-23 NOTE — THERAPY EVALUATION
Acute Care - Physical Therapy Initial Evaluation  MAURICE Escamilla     Patient Name: Charlette Rai  : 1935  MRN: [id number]  Today's Date: 2024      Visit Dx:     ICD-10-CM ICD-9-CM   1. Decreased activities of daily living (ADL)  Z78.9 V49.89   2. ESRD (end stage renal disease) on dialysis  N18.6 585.6    Z99.2 V45.11   3. Difficulty walking  R26.2 719.7     Patient Active Problem List   Diagnosis    Malignant neoplasm of upper lobe of right lung    Rheumatoid arthritis    Asthma    Chronic heart failure with preserved ejection fraction    Essential hypertension    GERD (gastroesophageal reflux disease)    Hyperlipidemia LDL goal <70    Lumbar spinal stenosis    Migraine    Monoclonal paraproteinemia    Osteopenia    Oxygen dependent    Peripheral neuropathy    Stage 4 chronic kidney disease    Vitamin D deficiency    Carotid artery stenosis    Chronic right-sided thoracic back pain    Peripheral vascular disease of lower extremity    Ex-smoker    De Quervain's tenosynovitis    Arthritis of carpometacarpal (CMC) joint of right thumb    Steal syndrome of dialysis vascular access    Anemia of chronic renal failure, stage 4 (severe)    Aortic stenosis, moderate    Hematoma    End stage renal disease on dialysis    Coronary artery calcification seen on CT scan    Frailty syndrome in geriatric patient    COPD with lower respiratory infection    Coagulation defect, unspecified    Hyperphosphatemia    Hypothyroidism (acquired)    Idiopathic osteoarthritis    Secondary multiple arthritis    Type 2 diabetes mellitus with diabetic peripheral angiopathy without gangrene    Acute on chronic respiratory failure with hypoxia and hypercapnia    Elevated troponin level not due myocardial infarction    Renal artery stenosis    S/P renal artery angioplasty    Atherosclerosis of renal artery    Abnormal serum immunoelectrophoresis    ESRD (end stage renal disease) on dialysis    Hypoxia     Past Medical History:   Diagnosis  Date    Allergic rhinitis     Anaphylactic shock, unspecified, initial encounter 12/05/2023    Anemia     Arthritis     Asthma     USE INHALERS AND NEBULIZERS    Back pain     Bladder disorder     Cancer     LEFT LUNG CANCER 2005 SURGERY AND CHEMO DONE  AND CURRENTLY 4/ 14/22  RIGHT LUNG CANCER HAS ONLY RECEIVED RADIATION THUS FAR    Chronic kidney disease     stage 3    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 DENIES CP BUT DOES HAVE SOA CHRONIC ISSUE COPD/LUNG CANCER    COPD (chronic obstructive pulmonary disease)     FOLLOWED BY DR MARIAJOSE BAILEY    Coronary artery disease     DENIES CP BUT DOES GET SOA MOST OF THE TIME WITH EXERTION BUT OCC AT REST CHRONIC ISSUE COPD/LUNG CANCER    Deep vein thrombosis     Diabetes mellitus     DOES NOT CHECK BS DAILY    Disease of thyroid gland     HYPOTHYROIDISM    Essential hypertension     Gastric ulcer     GERD (gastroesophageal reflux disease)     Heart murmur     History of transfusion     NO ISSUES POST TRANSFUSION WAS MANY YEARS AGO    Hyperlipemia     Leukocytopenia     FOLLOWED BY DR MIR BAILEY    Limb swelling     Lumbago     Lumbar spinal stenosis     Lung cancer     Migraine headache     Multiple joint pain     On home oxygen therapy     3L/NC PRN    Osteopenia     PNA (pneumonia) 05/04/2024    Pseudomonas pneumonia 04/29/2024    Reflux esophagitis     Shortness of breath     Thyroid nodule     HAS ULTRASOUND YEARLY BEING MONITORED    Vascular disease     Vitamin D deficiency      Past Surgical History:   Procedure Laterality Date    ABDOMINAL SURGERY      ANGIOPLASTY RENAL ARTERY Left 06/14/2024    stent placement    APPENDECTOMY      ARTERIOGRAM N/A 6/14/2024    Procedure: Arteriogram, right radial access, aortogram and renal arteriogram with possible intervention.;  Surgeon: Dio Miguel MD;  Location:  [facility] CATH INVASIVE LOCATION;  Service: Peripheral Vascular;  Laterality: N/A;    ARTERIOVENOUS FISTULA/SHUNT SURGERY Left 05/10/2022    Procedure: Left basilic vein transposition;  Surgeon: Wan Barksdale MD;  Location: [facility] MAIN OR;  Service: Vascular;  Laterality: Left;    ARTERIOVENOUS FISTULA/SHUNT SURGERY Left 03/23/2023    Procedure: Ligation of left arm arteriovenous fistula;  Surgeon: Wan Barksdale MD;  Location: [facility] MAIN OR;  Service: Vascular;  Laterality: Left;    ARTERIOVENOUS FISTULA/SHUNT SURGERY Left 11/30/2023    Procedure: Creation of left arm arteriovenous graft;  Surgeon: Wan Barksdale MD;  Location: [facility] MAIN OR;  Service: Vascular;  Laterality: Left;    BRONCHOSCOPY N/A 04/24/2024    Procedure: BRONCHOSCOPY WITH BAL AND WASHINGS;  Surgeon: Khalif Yanez MD;  Location: [facility] ENDOSCOPY;  Service: Pulmonary;  Laterality: N/A;  MUCUS PLUGGING    CARDIAC CATHETERIZATION  1996    CARDIAC SURGERY      CARDIAC SURGERY      fluid drained from heart    CATARACT EXTRACTION, BILATERAL  2003    COLONOSCOPY  2014    ENDOSCOPY  2016 2019    FEMORAL ARTERY STENT Bilateral     HYSTERECTOMY      LUNG BIOPSY Left 2005    lobectomy upper lung caner    LUNG VOLUME REDUCTION      OTHER SURGICAL HISTORY      artifical joints/limbs    REPLACEMENT TOTAL KNEE Left 2016    UPPER GASTROINTESTINAL ENDOSCOPY       PT Assessment (Last 12 Hours)       PT Evaluation and Treatment       Row Name 08/23/24 1415          Physical Therapy Time and Intention    Document Type evaluation  -AV     Mode of Treatment individual therapy;physical therapy  -AV       Row Name 08/23/24 1415          General Information    Patient Profile Reviewed yes  -AV     Patient Observations alert;cooperative;agree to therapy  -AV     Prior Level of Function independent:;all household mobility;gait;transfer;ADL's  Ambulated with RW for community mobility, furniture walks for household mobility. 3 L O2.  -AV     Equipment Currently Used  at Home oxygen;walker, rolling  -AV     Existing Precautions/Restrictions fall  -AV       Row Name 08/23/24 1415          Living Environment    Current Living Arrangements home  -AV     Home Accessibility stairs to enter home  -AV     People in Home spouse  -AV       Row Name 08/23/24 1415          Home Main Entrance    Number of Stairs, Main Entrance three  -AV     Stair Railings, Main Entrance railings on both sides of stairs  -AV       Row Name 08/23/24 1415          Cognition    Orientation Status (Cognition) oriented x 4  -AV       Row Name 08/23/24 1415          Range of Motion (ROM)    Range of Motion bilateral lower extremities;ROM is WFL  -AV       Row Name 08/23/24 1415          Strength (Manual Muscle Testing)    Strength (Manual Muscle Testing) bilateral lower extremities;strength is WFL  -AV       Row Name 08/23/24 1415          Bed Mobility    Bed Mobility supine-sit  -AV     Supine-Sit [place] (Bed Mobility) standby assist  -AV       Row Name 08/23/24 1415          Transfers    Transfers sit-stand transfer;stand-sit transfer  -AV       Row Name 08/23/24 1415          Sit-Stand Transfer    Sit-Stand [place] (Transfers) contact guard  -AV     Assistive Device (Sit-Stand Transfers) --  HHA  -AV       Row Name 08/23/24 1415          Stand-Sit Transfer    Stand-Sit [place] (Transfers) contact guard  -AV     Assistive Device (Stand-Sit Transfers) --  HHA  -AV       Row Name 08/23/24 1415          Gait/Stairs (Locomotion)    Gait/Stairs Locomotion gait/ambulation independence;gait/ambulation assistive device;distance ambulated  -     [place] Level (Gait) contact guard  -AV     Assistive Device (Gait) --  HHA  -AV     Distance in Feet (Gait) 3  x2, bed to BSC, BSC to recliner  -AV       Row Name 08/23/24 1415          Safety Issues, Functional Mobility    Impairments Affecting Function (Mobility) balance;endurance/activity tolerance;pain  -AV       Row Name 08/23/24 1415          Balance     Balance Assessment standing static balance  -AV     Dynamic Standing Balance contact guard  -AV     Position/Device Used, Standing Balance supported  HHA  -AV       Row Name 08/23/24 1458          Plan of Care Review    Plan of Care Reviewed With patient  -AV     Progress no change  -AV     Outcome Evaluation Patient presents with deficits in balance, endurance, transfers, and ambulation. Patient will benefit from skilled PT services to address these mobility deficits and decrease risk of falls.  -AV       Row Name 08/23/24 1458          Positioning and Restraints    Pre-Treatment Position in bed  -AV     Post Treatment Position chair  -AV     In Chair reclined;call light within reach;encouraged to call for assist  -AV       Row Name 08/23/24 1458          Therapy Assessment/Plan (PT)    Rehab Potential (PT) good, to achieve stated therapy goals  -AV     Criteria for Skilled Interventions Met (PT) yes;meets criteria  -AV     Therapy Frequency (PT) daily  -AV     Predicted Duration of Therapy Intervention (PT) 10 days  -AV     Problem List (PT) problems related to;balance;mobility;pain  -AV     Activity Limitations Related to Problem List (PT) unable to transfer safely;unable to ambulate safely  -AV       Row Name 08/23/24 1458          PT Evaluation Complexity    History, PT Evaluation Complexity 1-2 personal factors and/or comorbidities  -AV     Examination of Body Systems (PT Eval Complexity) total of 4 or more elements  -AV     Clinical Presentation (PT Evaluation Complexity) stable  -AV     Clinical Decision Making (PT Evaluation Complexity) low complexity  -AV     Overall Complexity (PT Evaluation Complexity) low complexity  -AV       Row Name 08/23/24 1458          Therapy Plan Review/Discharge Plan (PT)    Therapy Plan Review (PT) evaluation/treatment results reviewed;patient  -AV       Row Name 08/23/24 1458          Physical Therapy Goals    Transfer Goal Selection (PT) transfer, PT goal 1  -AV     Gait  Training Goal Selection (PT) gait training, PT goal 1  -AV       Row Name 08/23/24 1458          Transfer Goal 1 (PT)    Activity/Assistive Device (Transfer Goal 1, PT) sit-to-stand/stand-to-sit;bed-to-chair/chair-to-bed;walker, rolling  -AV     [place] Level/Cues Needed (Transfer Goal 1, PT) modified independence  -AV     Time Frame (Transfer Goal 1, PT) 10 days  -AV       Row Name 08/23/24 1458          Gait Training Goal 1 (PT)    Activity/Assistive Device (Gait Training Goal 1, PT) gait (walking locomotion);assistive device use;walker, rolling  -AV     [place] Level (Gait Training Goal 1, PT) modified independence  -AV     Distance (Gait Training Goal 1, PT) 200  -AV     Time Frame (Gait Training Goal 1, PT) 10 days  -AV               User Key  (r) = Recorded By, (t) = Taken By, (c) = Cosigned By      Initials Name Provider Type    AV Abdi Odom, PT Physical Therapist                    Physical Therapy Education       Title: PT OT SLP Therapies (In Progress)       Topic: Physical Therapy (In Progress)       Point: Mobility training (Done)       Learning Progress Summary             Patient Acceptance, E,TB, VU by AV at 8/23/2024 1501                         Point: Home exercise program (Not Started)       Learner Progress:  Not documented in this visit.              Point: Body mechanics (Done)       Learning Progress Summary             Patient Acceptance, E,TB, VU by AV at 8/23/2024 1501                         Point: Precautions (Done)       Learning Progress Summary             Patient Acceptance, E,TB, VU by AV at 8/23/2024 1501                                         User Key       Initials Effective Dates Name Provider Type Discipline     06/11/21 -  Abdi Odom, RENAN Physical Therapist PT                  PT Recommendation and Plan  Anticipated Discharge Disposition (PT): home with home health  Planned Therapy Interventions (PT): balance training, bed mobility training, gait  training, home exercise program, neuromuscular re-education, strengthening, transfer training  Therapy Frequency (PT): daily  Plan of Care Reviewed With: patient  Progress: no change  Outcome Evaluation: Patient presents with deficits in balance, endurance, transfers, and ambulation. Patient will benefit from skilled PT services to address these mobility deficits and decrease risk of falls.   Outcome Measures       Row Name 08/23/24 1500             How much help from another person do you currently need...    Turning from your back to your side while in flat bed without using bedrails? 4  -AV      Moving from lying on back to sitting on the side of a flat bed without bedrails? 3  -AV      Moving to and from a bed to a chair (including a wheelchair)? 3  -AV      Standing up from a chair using your arms (e.g., wheelchair, bedside chair)? 3  -AV      Climbing 3-5 steps with a railing? 3  -AV      To walk in hospital room? 3  -AV      AM-PAC 6 Clicks Score (PT) 19  -AV      Highest Level of Mobility Goal 6 --> Walk 10 steps or more  -AV         Functional Assessment    Outcome Measure Options AM-PAC 6 Clicks Basic Mobility (PT)  -AV                User Key  (r) = Recorded By, (t) = Taken By, (c) = Cosigned By      Initials Name Provider Type    AV Abdi Odom, PT Physical Therapist                     Time Calculation:    PT Charges       Row Name 08/23/24 1500             Time Calculation    PT Received On 08/23/24  -AV      PT Goal Re-Cert Due Date 09/01/24  -AV         Untimed Charges    PT Eval/Re-eval Minutes 35  -AV         Total Minutes    Untimed Charges Total Minutes 35  -AV       Total Minutes 35  -AV                User Key  (r) = Recorded By, (t) = Taken By, (c) = Cosigned By      Initials Name Provider Type    Abdi Tee, PT Physical Therapist                  Therapy Charges for Today       Code Description Service Date Service Provider Modifiers Qty    66932190628 HC PT EVAL LOW  COMPLEXITY 3 8/23/2024 Abdi Odom, PT GP 1            PT G-Codes  Outcome Measure Options: AM-PAC 6 Clicks Basic Mobility (PT)  AM-PAC 6 Clicks Score (PT): 19  AM-PAC 6 Clicks Score (OT): 18    Abdi Odom, PT  8/23/2024

## 2024-08-23 NOTE — THERAPY EVALUATION
Patient Name: Charlette Rai  : 1935    MRN: [id number]                              Today's Date: 2024       Admit Date: 2024    Visit Dx:     ICD-10-CM ICD-9-CM   1. Decreased activities of daily living (ADL)  Z78.9 V49.89   2. ESRD (end stage renal disease) on dialysis  N18.6 585.6    Z99.2 V45.11     Patient Active Problem List   Diagnosis    Malignant neoplasm of upper lobe of right lung    Rheumatoid arthritis    Asthma    Chronic heart failure with preserved ejection fraction    Essential hypertension    GERD (gastroesophageal reflux disease)    Hyperlipidemia LDL goal <70    Lumbar spinal stenosis    Migraine    Monoclonal paraproteinemia    Osteopenia    Oxygen dependent    Peripheral neuropathy    Stage 4 chronic kidney disease    Vitamin D deficiency    Carotid artery stenosis    Chronic right-sided thoracic back pain    Peripheral vascular disease of lower extremity    Ex-smoker    De Quervain's tenosynovitis    Arthritis of carpometacarpal (CMC) joint of right thumb    Steal syndrome of dialysis vascular access    Anemia of chronic renal failure, stage 4 (severe)    Aortic stenosis, moderate    Hematoma    End stage renal disease on dialysis    Coronary artery calcification seen on CT scan    Frailty syndrome in geriatric patient    COPD with lower respiratory infection    Coagulation defect, unspecified    Hyperphosphatemia    Hypothyroidism (acquired)    Idiopathic osteoarthritis    Secondary multiple arthritis    Type 2 diabetes mellitus with diabetic peripheral angiopathy without gangrene    Acute on chronic respiratory failure with hypoxia and hypercapnia    Elevated troponin level not due myocardial infarction    Renal artery stenosis    S/P renal artery angioplasty    Atherosclerosis of renal artery    Abnormal serum immunoelectrophoresis    ESRD (end stage renal disease) on dialysis    Hypoxia     Past Medical History:   Diagnosis Date    Allergic rhinitis     Anaphylactic  shock, unspecified, initial encounter 12/05/2023    Anemia     Arthritis     Asthma     USE INHALERS AND NEBULIZERS    Back pain     Bladder disorder     Cancer     LEFT LUNG CANCER 2005 SURGERY AND CHEMO DONE  AND CURRENTLY 4/ 14/22  RIGHT LUNG CANCER HAS ONLY RECEIVED RADIATION THUS FAR    Chronic kidney disease     stage 3    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 DENIES CP BUT DOES HAVE SOA CHRONIC ISSUE COPD/LUNG CANCER    COPD (chronic obstructive pulmonary disease)     FOLLOWED BY DR MARIAJOSE BAILEY    Coronary artery disease     DENIES CP BUT DOES GET SOA MOST OF THE TIME WITH EXERTION BUT OCC AT REST CHRONIC ISSUE COPD/LUNG CANCER    Deep vein thrombosis     Diabetes mellitus     DOES NOT CHECK BS DAILY    Disease of thyroid gland     HYPOTHYROIDISM    Essential hypertension     Gastric ulcer     GERD (gastroesophageal reflux disease)     Heart murmur     History of transfusion     NO ISSUES POST TRANSFUSION WAS MANY YEARS AGO    Hyperlipemia     Leukocytopenia     FOLLOWED BY DR MIR BAILEY    Limb swelling     Lumbago     Lumbar spinal stenosis     Lung cancer     Migraine headache     Multiple joint pain     On home oxygen therapy     3L/NC PRN    Osteopenia     PNA (pneumonia) 05/04/2024    Pseudomonas pneumonia 04/29/2024    Reflux esophagitis     Shortness of breath     Thyroid nodule     HAS ULTRASOUND YEARLY BEING MONITORED    Vascular disease     Vitamin D deficiency      Past Surgical History:   Procedure Laterality Date    ABDOMINAL SURGERY      ANGIOPLASTY RENAL ARTERY Left 06/14/2024    stent placement    APPENDECTOMY      ARTERIOGRAM N/A 6/14/2024    Procedure: Arteriogram, right radial access, aortogram and renal arteriogram with possible intervention.;  Surgeon: Dio Miguel MD;  Location: [facility] INVASIVE LOCATION;  Service:  Peripheral Vascular;  Laterality: N/A;    ARTERIOVENOUS FISTULA/SHUNT SURGERY Left 05/10/2022    Procedure: Left basilic vein transposition;  Surgeon: Wan Barksdale MD;  Location: [facility] MAIN OR;  Service: Vascular;  Laterality: Left;    ARTERIOVENOUS FISTULA/SHUNT SURGERY Left 03/23/2023    Procedure: Ligation of left arm arteriovenous fistula;  Surgeon: Wan Barksdale MD;  Location: [facility] MAIN OR;  Service: Vascular;  Laterality: Left;    ARTERIOVENOUS FISTULA/SHUNT SURGERY Left 11/30/2023    Procedure: Creation of left arm arteriovenous graft;  Surgeon: Wan Barksdale MD;  Location: [facility] MAIN OR;  Service: Vascular;  Laterality: Left;    BRONCHOSCOPY N/A 04/24/2024    Procedure: BRONCHOSCOPY WITH BAL AND WASHINGS;  Surgeon: Khalif Yanez MD;  Location: [facility] ENDOSCOPY;  Service: Pulmonary;  Laterality: N/A;  MUCUS PLUGGING    CARDIAC CATHETERIZATION  1996    CARDIAC SURGERY      CARDIAC SURGERY      fluid drained from heart    CATARACT EXTRACTION, BILATERAL  2003    COLONOSCOPY  2014    ENDOSCOPY  2016 2019    FEMORAL ARTERY STENT Bilateral     HYSTERECTOMY      LUNG BIOPSY Left 2005    lobectomy upper lung caner    LUNG VOLUME REDUCTION      OTHER SURGICAL HISTORY      artifical joints/limbs    REPLACEMENT TOTAL KNEE Left 2016    UPPER GASTROINTESTINAL ENDOSCOPY        General Information       Row Name 08/23/24 140          OT Time and Intention    Document Type evaluation  -LF     Mode of Treatment individual therapy;occupational therapy  -       Row Name 08/23/24 4835          General Information    Patient Profile Reviewed yes  -LF     Prior Level of Function --  (I) with ADLs, ambulated with a RW in the community and furniture walks at home, has a step over tub that she sits within to bathe, has an elevated commode, stands to groom, and 3L home O2.  -LF     Existing Precautions/Restrictions fall  -LF     Barriers to Rehab none identified  -       Row Name 08/23/24 7782           Occupational Profile    Reason for Services/Referral (Occupational Profile) Patient is an 89 year old female admitted to [facility] for hypoxia s/p tunneled dialysis catheter removal on August 21st, 2024. Occupational therapy consulted due to recent decline in ADLs/functional transfers. No previous occupational therapy services for current condition.  -       Row Name 08/23/24 1407          Living Environment    People in Home spouse  -       Row Name 08/23/24 1407          Cognition    Orientation Status (Cognition) oriented x 4  -       Row Name 08/23/24 1407          Safety Issues, Functional Mobility    Impairments Affecting Function (Mobility) balance;endurance/activity tolerance;pain  -LF               User Key  (r) = Recorded By, (t) = Taken By, (c) = Cosigned By      Initials Name Provider Type    LF Perlita Mckay OT Occupational Therapist                     Mobility/ADL's       Row Name 08/23/24 1412          Bed Mobility    Bed Mobility supine-sit  -LF     Supine-Sit [place] (Bed Mobility) standby assist  -       Row Name 08/23/24 1412          Transfers    Transfers sit-stand transfer;stand-sit transfer;toilet transfer  -       Row Name 08/23/24 1412          Sit-Stand Transfer    Sit-Stand [place] (Transfers) contact guard  -LF     Assistive Device (Sit-Stand Transfers) other (see comments)  handheld  -LF       Row Name 08/23/24 1412          Stand-Sit Transfer    Stand-Sit [place] (Transfers) contact guard  -LF     Assistive Device (Stand-Sit Transfers) other (see comments)  handheld  -LF       Row Name 08/23/24 1412          Toilet Transfer    Type (Toilet Transfer) stand pivot/stand step  -LF     [place] Level (Toilet Transfer) contact guard  -LF     Assistive Device (Toilet Transfer) commode, 3-in-1  -       Row Name 08/23/24 1412          Activities of Daily Living    BADL Assessment/Intervention bathing;upper body dressing;lower body  dressing;grooming;feeding;toileting  -[facility] Name 08/23/24 1412          Bathing Assessment/Intervention    [place] Level (Bathing) bathing skills;upper body;standby assist;lower body;maximum assist (25% patient effort)  -       Row Name 08/23/24 1412          Upper Body Dressing Assessment/Training    [place] Level (Upper Body Dressing) upper body dressing skills;standby assist  -[facility] Name 08/23/24 1412          Lower Body Dressing Assessment/Training    [place] Level (Lower Body Dressing) lower body dressing skills;maximum assist (25% patient effort)  -[facility] Name 08/23/24 1412          Grooming Assessment/Training    [place] Level (Grooming) grooming skills;set up  -[facility] Name 08/23/24 1412          Self-Feeding Assessment/Training    [place] Level (Feeding) feeding skills;set up  -[facility] Name 08/23/24 1412          Toileting Assessment/Training    [place] Level (Toileting) toileting skills;minimum assist (75% patient effort)  -               User Key  (r) = Recorded By, (t) = Taken By, (c) = Cosigned By      Initials Name Provider Type     Perlita Mckay OT Occupational Therapist                   Obj/Interventions       [facility] Name 08/23/24 1413          Sensory Assessment (Somatosensory)    Sensory Assessment (Somatosensory) UE sensation intact  -LF       Row Name 08/23/24 1413          Vision Assessment/Intervention    Visual Impairment/Limitations WFL  -[facility] Name 08/23/24 1413          Range of Motion Comprehensive    General Range of Motion bilateral upper extremity ROM WFL  -LF       Row Name 08/23/24 1413          Strength Comprehensive (MMT)    Comment, General Manual Muscle Testing (MMT) Assessment 4/5 BUEs  -LF       Row Name 08/23/24 1413          Motor Skills    Motor Skills coordination;functional endurance  -LF     Coordination bilateral;upper extremity;WFL  -LF     Functional Endurance Fair  -[facility] Name 08/23/24  1413          Balance    Balance Assessment sitting dynamic balance;standing dynamic balance  -LF     Dynamic Sitting Balance supervision  -LF     Position, Sitting Balance unsupported;sitting edge of bed  -LF     Dynamic Standing Balance contact guard  -LF     Position/Device Used, Standing Balance supported;other (see comments)  handheld  -LF               User Key  (r) = Recorded By, (t) = Taken By, (c) = Cosigned By      Initials Name Provider Type     Perlita Mckay, OT Occupational Therapist                   Goals/Plan       Row Name 08/23/24 1421          Bed Mobility Goal 1 (OT)    Activity/Assistive Device (Bed Mobility Goal 1, OT) bed mobility activities, all  -LF     [place] Level/Cues Needed (Bed Mobility Goal 1, OT) modified independence  -LF     Time Frame (Bed Mobility Goal 1, OT) long term goal (LTG);10 days  -LF       Row Name 08/23/24 1421          Transfer Goal 1 (OT)    Activity/Assistive Device (Transfer Goal 1, OT) transfers, all  -LF     [place] Level/Cues Needed (Transfer Goal 1, OT) modified independence  -LF     Time Frame (Transfer Goal 1, OT) long term goal (LTG);10 days  -LF       Row Name 08/23/24 1421          Bathing Goal 1 (OT)    Activity/Device (Bathing Goal 1, OT) bathing skills, all  -LF     [place] Level/Cues Needed (Bathing Goal 1, OT) modified independence  -LF     Time Frame (Bathing Goal 1, OT) long term goal (LTG);10 days  -LF       Row Name 08/23/24 1421          Dressing Goal 1 (OT)    Activity/Device (Dressing Goal 1, OT) dressing skills, all  -LF     [place]/Cues Needed (Dressing Goal 1, OT) modified independence  -LF     Time Frame (Dressing Goal 1, OT) long term goal (LTG);10 days  -LF       Row Name 08/23/24 1421          Toileting Goal 1 (OT)    Activity/Device (Toileting Goal 1, OT) toileting skills, all  -LF     [place] Level/Cues Needed (Toileting Goal 1, OT) modified independence  -LF     Time Frame (Toileting Goal 1, OT) long term  goal (LTG);10 days  -       Row Name 08/23/24 1421          Problem Specific Goal 1 (OT)    Problem Specific Goal 1 (OT) Patient will demonstrate good- endurance to support ADLs/functional transfers.  -LF     Time Frame (Problem Specific Goal 1, OT) long term goal (LTG);10 days  -LF       Row Name 08/23/24 1421          Therapy Assessment/Plan (OT)    Planned Therapy Interventions (OT) activity tolerance training;BADL retraining;functional balance retraining;occupation/activity based interventions;patient/caregiver education/training;strengthening exercise;transfer/mobility retraining  -               User Key  (r) = Recorded By, (t) = Taken By, (c) = Cosigned By      Initials Name Provider Type     Perlita Mckay, MARCELLE Occupational Therapist                   Clinical Impression       Row Name 08/23/24 1415          Pain Assessment    Additional Documentation Pain Scale: FACES Pre/Post-Treatment (Group)  -LF       Row Name 08/23/24 1415          Pain Scale: FACES Pre/Post-Treatment    Pain: FACES Scale, Pretreatment 2-->hurts little bit  -LF     Posttreatment Pain Rating 2-->hurts little bit  -LF     Pain Location - Side/Orientation Bilateral  -LF     Pain Location upper  -LF     Pain Location - extremity  -LF       Row Name 08/23/24 1415          Plan of Care Review    Plan of Care Reviewed With patient  -LF     Progress no change  -LF     Outcome Evaluation Patient presents with limitations in self-care, functional transfers, balance, and endurance. She would benefit from continued skilled occupational therapy services to maximize independence with ADLs/functional transfers.  -       Row Name 08/23/24 1415          Therapy Assessment/Plan (OT)    Patient/Family Therapy Goal Statement (OT) To maximize independence.  -LF     Rehab Potential (OT) good, to achieve stated therapy goals  -LF     Criteria for Skilled Therapeutic Interventions Met (OT) yes;meets criteria;skilled treatment is necessary  -      Therapy Frequency (OT) 5 times/wk  -LF       Row Name 08/23/24 1415          Therapy Plan Review/Discharge Plan (OT)    Anticipated Discharge Disposition (OT) home with home health;home with assist  -LF       Row Name 08/23/24 1415          Vital Signs    O2 Delivery Pre Treatment nasal cannula  -LF     O2 Delivery Intra Treatment nasal cannula  -LF     O2 Delivery Post Treatment nasal cannula  -LF       Row Name 08/23/24 1415          Positioning and Restraints    Pre-Treatment Position in bed  -LF     Post Treatment Position chair  -LF     In Chair reclined;call light within reach;encouraged to call for assist  -LF               User Key  (r) = Recorded By, (t) = Taken By, (c) = Cosigned By      Initials Name Provider Type    LF Perlita Mckay OT Occupational Therapist                   Outcome Measures       Row Name 08/23/24 1422          How much help from another is currently needed...    Putting on and taking off regular lower body clothing? 2  -LF     Bathing (including washing, rinsing, and drying) 2  -LF     Toileting (which includes using toilet bed pan or urinal) 3  -LF     Putting on and taking off regular upper body clothing 3  -LF     Taking care of personal grooming (such as brushing teeth) 4  -LF     Eating meals 4  -LF     AM-PAC 6 Clicks Score (OT) 18  -LF       Row Name 08/23/24 0726 08/23/24 0400       How much help from another person do you currently need...    Turning from your back to your side while in flat bed without using bedrails? 2  -ET 2  -AE    Moving from lying on back to sitting on the side of a flat bed without bedrails? 2  -ET 2  -AE    Moving to and from a bed to a chair (including a wheelchair)? 3  -ET 2  -AE    Standing up from a chair using your arms (e.g., wheelchair, bedside chair)? 3  -ET 2  -AE    Climbing 3-5 steps with a railing? 2  -ET 2  -AE    To walk in hospital room? 2  -ET 2  -AE    AM-PAC 6 Clicks Score (PT) 14  -ET 12  -AE    Highest Level of Mobility Goal 4  --> Transfer to chair/commode  -ET 4 --> Transfer to chair/commode  -AE      Row Name 08/23/24 1422          Functional Assessment    Outcome Measure Options AM-PAC 6 Clicks Daily Activity (OT);Optimal Instrument  -LF       Row Name 08/23/24 1422          Optimal Instrument    Optimal Instrument Optimal - 3  -LF     Bending/Stooping 3  -LF     Standing 2  -LF     Reaching 1  -LF     From the list, choose the 3 activities you would most like to be able to do without any difficulty Bending/stooping;Standing;Reaching  -LF     Total Score Optimal - 3 6  -LF               User Key  (r) = Recorded By, (t) = Taken By, (c) = Cosigned By      Initials Name Provider Type     Perlita Mckay OT Occupational Therapist    Karlee Ortega, RN Registered Nurse    ET Eladia Damon RN Registered Nurse                    Occupational Therapy Education       Title: PT OT SLP Therapies (Done)       Topic: Occupational Therapy (Done)       Point: ADL training (Done)       Description:   Instruct learner(s) on proper safety adaptation and remediation techniques during self care or transfers.   Instruct in proper use of assistive devices.                  Learning Progress Summary             Patient Acceptance, E,TB, VU by  at 8/23/2024 1424                         Point: Precautions (Done)       Description:   Instruct learner(s) on prescribed precautions during self-care and functional transfers.                  Learning Progress Summary             Patient Acceptance, E,TB, VU by  at 8/23/2024 1424                         Point: Body mechanics (Done)       Description:   Instruct learner(s) on proper positioning and spine alignment during self-care, functional mobility activities and/or exercises.                  Learning Progress Summary             Patient Acceptance, E,TB, VU by  at 8/23/2024 1424                                         User Key       Initials Effective Dates Name Provider Type [facility]  06/16/21 -  Perlita Mckay OT Occupational Therapist OT                  OT Recommendation and Plan  Planned Therapy Interventions (OT): activity tolerance training, BADL retraining, functional balance retraining, occupation/activity based interventions, patient/caregiver education/training, strengthening exercise, transfer/mobility retraining  Therapy Frequency (OT): 5 times/wk  Plan of Care Review  Plan of Care Reviewed With: patient  Progress: no change  Outcome Evaluation: Patient presents with limitations in self-care, functional transfers, balance, and endurance. She would benefit from continued skilled occupational therapy services to maximize independence with ADLs/functional transfers.     Time Calculation:   Evaluation Complexity (OT)  Review Occupational Profile/Medical/Therapy History Complexity: brief/low complexity  Assessment, Occupational Performance/Identification of Deficit Complexity: 3-5 performance deficits  Clinical Decision Making Complexity (OT): problem focused assessment/low complexity  Overall Complexity of Evaluation (OT): low complexity     Time Calculation- OT       Row Name 08/23/24 1424             Time Calculation- OT    OT Received On 08/23/24  -LF      OT Goal Re-Cert Due Date 09/01/24  -         Untimed Charges    OT Eval/Re-eval Minutes 35  -LF         Total Minutes    Untimed Charges Total Minutes 35  -LF       Total Minutes 35  -LF                User Key  (r) = Recorded By, (t) = Taken By, (c) = Cosigned By      Initials Name Provider Type    LF Perlita Mckay OT Occupational Therapist                  Therapy Charges for Today       Code Description Service Date Service Provider Modifiers Qty    [id number] HC OT EVAL LOW COMPLEXITY 3 8/23/2024 Perlita Mckay OT GO 1                 Perlita Mckay OT  8/23/2024

## 2024-08-23 NOTE — PROGRESS NOTES
[facility]     Progress Note    Patient Name: Charlette Rai  : 1935  MRN: [id number]  Primary Care Physician:  Brennan Mosqueda MD  Date of admission: 2024    Subjective   Subjective     Chief Complaint: Reason for consultation critical care management    History of Present Illness  Patient extubated on 2024  Has had some hypoglycemia overnight  Feels very weak and fatigued today    Review of Systems    Objective   Objective     Vitals:   Temp:  [97.7 °F (36.5 °C)-99.7 °F (37.6 °C)] 99 °F (37.2 °C)  Heart Rate:  [72-99] 84  Resp:  [14-18] 16  BP: ()/() 131/21  Flow (L/min):  [3] 3    Physical Exam   Critically ill  Female  Follows commands with all 4 extremities  Following commands  Result Review    Result Review:  I have personally reviewed the results from the time of this admission to 2024 09:00 EDT and agree with these findings:  []  Laboratory list / accordion  []  Microbiology  []  Radiology  []  EKG/Telemetry   []  Cardiology/Vascular   []  Pathology  []  Old records  []  Other:        Assessment & Plan   Assessment / Plan     Brief Patient Summary:  Charlette Rai is a 89 y.o. 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     Hypoxia    Acute pulmonary edema  Acute on chronic hypoxic and hypercapnic respiratory failure  Hypertensive emergency with systolic blood pressures greater than 200-just preceding intubation blood pressure was 206/98  Hyperkalemia  Plan:   Still with some hypoglycemia  Continue D5 LR  Follow glucose  Will try to wean off of D5 LR today  Push p.o. intake  Replace magnesium with IV mag  Hemodialysis today per nephrology service  From a standpoint to transfer out of ICU    VTE Prophylaxis:  Pharmacologic & mechanical VTE prophylaxis orders are present.        CODE STATUS:    Code Status (Patient has no pulse and is not breathing):  CPR (Attempt to Resuscitate)  Medical Interventions (Patient has pulse or is breathing): Full Support      Regis Holcomb DO    Electronically signed by Regis Holcomb DO, 08/23/24, 12:05 PM EDT.

## 2024-08-24 ENCOUNTER — APPOINTMENT (OUTPATIENT)
Dept: CARDIOLOGY | Facility: HOSPITAL | Age: 89
End: 2024-08-24
Payer: MEDICARE

## 2024-08-24 PROBLEM — I47.29 NSVT (NONSUSTAINED VENTRICULAR TACHYCARDIA): Status: ACTIVE | Noted: 2024-08-24

## 2024-08-24 LAB
ALBUMIN SERPL-MCNC: 2.9 G/DL (ref 3.5–5.2)
ALBUMIN/GLOB SERPL: 1 G/DL
ALP SERPL-CCNC: 191 U/L (ref 39–117)
ALT SERPL W P-5'-P-CCNC: 9 U/L (ref 1–33)
ANION GAP SERPL CALCULATED.3IONS-SCNC: 11.6 MMOL/L (ref 5–15)
AORTIC DIMENSIONLESS INDEX: 0.49 (DI)
AST SERPL-CCNC: 24 U/L (ref 1–32)
BH CV ECHO MEAS - AO MAX PG: 24.1 MMHG
BH CV ECHO MEAS - AO MEAN PG: 15.4 MMHG
BH CV ECHO MEAS - AO V2 MAX: 245.3 CM/SEC
BH CV ECHO MEAS - AO V2 VTI: 71.5 CM
BH CV ECHO MEAS - AVA(I,D): 1.64 CM2
BH CV ECHO MEAS - IVSD: 1.4 CM
BH CV ECHO MEAS - LV MAX PG: 8.2 MMHG
BH CV ECHO MEAS - LV MEAN PG: 3.5 MMHG
BH CV ECHO MEAS - LV V1 MAX: 142.9 CM/SEC
BH CV ECHO MEAS - LV V1 VTI: 35.6 CM
BH CV ECHO MEAS - LVOT AREA: 3.3 CM2
BH CV ECHO MEAS - LVOT DIAM: 2.05 CM
BH CV ECHO MEAS - LVPWD: 1.3 CM
BH CV ECHO MEAS - SV(LVOT): 117.1 ML
BH CV ECHO MEAS - SVI(LVOT): 68.5 ML/M2
BILIRUB SERPL-MCNC: 0.4 MG/DL (ref 0–1.2)
BUN SERPL-MCNC: 15 MG/DL (ref 8–23)
BUN/CREAT SERPL: 6.6 (ref 7–25)
CALCIUM SPEC-SCNC: 8.6 MG/DL (ref 8.6–10.5)
CHLORIDE SERPL-SCNC: 97 MMOL/L (ref 98–107)
CO2 SERPL-SCNC: 23.4 MMOL/L (ref 22–29)
CREAT SERPL-MCNC: 2.28 MG/DL (ref 0.57–1)
DEPRECATED RDW RBC AUTO: 54.7 FL (ref 37–54)
EGFRCR SERPLBLD CKD-EPI 2021: 20.3 ML/MIN/1.73
ERYTHROCYTE [DISTWIDTH] IN BLOOD BY AUTOMATED COUNT: 14.1 % (ref 12.3–15.4)
GLOBULIN UR ELPH-MCNC: 3 GM/DL
GLUCOSE BLDC GLUCOMTR-MCNC: 104 MG/DL (ref 70–99)
GLUCOSE BLDC GLUCOMTR-MCNC: 182 MG/DL (ref 70–99)
GLUCOSE BLDC GLUCOMTR-MCNC: 194 MG/DL (ref 70–99)
GLUCOSE SERPL-MCNC: 142 MG/DL (ref 65–99)
HCT VFR BLD AUTO: 32.7 % (ref 34–46.6)
HGB BLD-MCNC: 9.4 G/DL (ref 12–15.9)
MAGNESIUM SERPL-MCNC: 1.9 MG/DL (ref 1.6–2.4)
MCH RBC QN AUTO: 30.3 PG (ref 26.6–33)
MCHC RBC AUTO-ENTMCNC: 28.7 G/DL (ref 31.5–35.7)
MCV RBC AUTO: 105.5 FL (ref 79–97)
PHOSPHATE SERPL-MCNC: 3.2 MG/DL (ref 2.5–4.5)
PLATELET # BLD AUTO: 112 10*3/MM3 (ref 140–450)
PMV BLD AUTO: 10.1 FL (ref 6–12)
POTASSIUM SERPL-SCNC: 5.2 MMOL/L (ref 3.5–5.2)
PROT SERPL-MCNC: 5.9 G/DL (ref 6–8.5)
RBC # BLD AUTO: 3.1 10*6/MM3 (ref 3.77–5.28)
SODIUM SERPL-SCNC: 132 MMOL/L (ref 136–145)
WBC NRBC COR # BLD AUTO: 5.95 10*3/MM3 (ref 3.4–10.8)

## 2024-08-24 PROCEDURE — 25010000002 HEPARIN (PORCINE) PER 1000 UNITS: Performed by: SURGERY

## 2024-08-24 PROCEDURE — 93321 DOPPLER ECHO F-UP/LMTD STD: CPT | Performed by: INTERNAL MEDICINE

## 2024-08-24 PROCEDURE — 94799 UNLISTED PULMONARY SVC/PX: CPT

## 2024-08-24 PROCEDURE — 93321 DOPPLER ECHO F-UP/LMTD STD: CPT

## 2024-08-24 PROCEDURE — 85027 COMPLETE CBC AUTOMATED: CPT | Performed by: PHYSICIAN ASSISTANT

## 2024-08-24 PROCEDURE — 99232 SBSQ HOSP IP/OBS MODERATE 35: CPT | Performed by: INTERNAL MEDICINE

## 2024-08-24 PROCEDURE — 25010000002 MAGNESIUM SULFATE 2 GM/50ML SOLUTION: Performed by: INTERNAL MEDICINE

## 2024-08-24 PROCEDURE — 82948 REAGENT STRIP/BLOOD GLUCOSE: CPT

## 2024-08-24 PROCEDURE — 83735 ASSAY OF MAGNESIUM: CPT | Performed by: PHYSICIAN ASSISTANT

## 2024-08-24 PROCEDURE — 93308 TTE F-UP OR LMTD: CPT

## 2024-08-24 PROCEDURE — 80053 COMPREHEN METABOLIC PANEL: CPT | Performed by: PHYSICIAN ASSISTANT

## 2024-08-24 PROCEDURE — 84100 ASSAY OF PHOSPHORUS: CPT | Performed by: PHYSICIAN ASSISTANT

## 2024-08-24 PROCEDURE — 93308 TTE F-UP OR LMTD: CPT | Performed by: INTERNAL MEDICINE

## 2024-08-24 PROCEDURE — 25010000002 HYDROCORTISONE SOD SUC (PF) 100 MG RECONSTITUTED SOLUTION: Performed by: PHYSICIAN ASSISTANT

## 2024-08-24 PROCEDURE — 93325 DOPPLER ECHO COLOR FLOW MAPG: CPT | Performed by: INTERNAL MEDICINE

## 2024-08-24 PROCEDURE — 82948 REAGENT STRIP/BLOOD GLUCOSE: CPT | Performed by: INTERNAL MEDICINE

## 2024-08-24 PROCEDURE — 99221 1ST HOSP IP/OBS SF/LOW 40: CPT | Performed by: INTERNAL MEDICINE

## 2024-08-24 PROCEDURE — 93325 DOPPLER ECHO COLOR FLOW MAPG: CPT

## 2024-08-24 PROCEDURE — 94664 DEMO&/EVAL PT USE INHALER: CPT

## 2024-08-24 RX ORDER — MAGNESIUM SULFATE HEPTAHYDRATE 40 MG/ML
2 INJECTION, SOLUTION INTRAVENOUS ONCE
Status: COMPLETED | OUTPATIENT
Start: 2024-08-24 | End: 2024-08-24

## 2024-08-24 RX ADMIN — CARVEDILOL 12.5 MG: 12.5 TABLET, FILM COATED ORAL at 17:22

## 2024-08-24 RX ADMIN — SODIUM ZIRCONIUM CYCLOSILICATE 10 G: 10 POWDER, FOR SUSPENSION ORAL at 09:23

## 2024-08-24 RX ADMIN — HYDROCORTISONE SODIUM SUCCINATE 50 MG: 100 INJECTION, POWDER, FOR SOLUTION INTRAMUSCULAR; INTRAVENOUS at 15:50

## 2024-08-24 RX ADMIN — BUDESONIDE 0.5 MG: 0.5 SUSPENSION RESPIRATORY (INHALATION) at 20:18

## 2024-08-24 RX ADMIN — CLOPIDOGREL BISULFATE 75 MG: 75 TABLET ORAL at 08:04

## 2024-08-24 RX ADMIN — BISACODYL 5 MG: 5 TABLET, COATED ORAL at 21:52

## 2024-08-24 RX ADMIN — HEPARIN SODIUM 5000 UNITS: 5000 INJECTION INTRAVENOUS; SUBCUTANEOUS at 08:04

## 2024-08-24 RX ADMIN — MAGNESIUM SULFATE HEPTAHYDRATE 2 G: 40 INJECTION, SOLUTION INTRAVENOUS at 15:49

## 2024-08-24 RX ADMIN — ACETAMINOPHEN 650 MG: 325 TABLET ORAL at 21:51

## 2024-08-24 RX ADMIN — SENNOSIDES AND DOCUSATE SODIUM 2 TABLET: 50; 8.6 TABLET ORAL at 02:43

## 2024-08-24 RX ADMIN — LEVOTHYROXINE SODIUM 25 MCG: 0.03 TABLET ORAL at 06:41

## 2024-08-24 RX ADMIN — CARVEDILOL 12.5 MG: 12.5 TABLET, FILM COATED ORAL at 08:04

## 2024-08-24 RX ADMIN — SPIRONOLACTONE 25 MG: 25 TABLET ORAL at 08:12

## 2024-08-24 RX ADMIN — HYDROCORTISONE SODIUM SUCCINATE 50 MG: 100 INJECTION, POWDER, FOR SOLUTION INTRAMUSCULAR; INTRAVENOUS at 06:40

## 2024-08-24 RX ADMIN — BUDESONIDE 0.5 MG: 0.5 SUSPENSION RESPIRATORY (INHALATION) at 08:38

## 2024-08-24 RX ADMIN — Medication 10 ML: at 21:52

## 2024-08-24 RX ADMIN — ASPIRIN 81 MG: 81 TABLET, COATED ORAL at 08:04

## 2024-08-24 RX ADMIN — HEPARIN SODIUM 5000 UNITS: 5000 INJECTION INTRAVENOUS; SUBCUTANEOUS at 21:51

## 2024-08-24 RX ADMIN — IPRATROPIUM BROMIDE AND ALBUTEROL SULFATE 3 ML: .5; 3 SOLUTION RESPIRATORY (INHALATION) at 01:57

## 2024-08-24 RX ADMIN — HYDROCORTISONE SODIUM SUCCINATE 50 MG: 100 INJECTION, POWDER, FOR SOLUTION INTRAMUSCULAR; INTRAVENOUS at 23:57

## 2024-08-24 RX ADMIN — Medication 10 ML: at 08:05

## 2024-08-24 NOTE — PROGRESS NOTES
[facility]     Progress Note    Patient Name: Charlette Rai  : 1935  MRN: [id number]  Primary Care Physician:  Brennan Mosqueda MD  Date of admission: 2024    Subjective   Subjective     Follow-up for acute on chronic hypoxic and hypercapnic respiratory failure, and pulmonary edema    Over past 24 hours, transferred out of ICU    No acute events overnight.     This afternoon,  Sitting up in bed  Overall feeling better  Dyspnea at baseline  Currently on 3.5 L nasal cannula  Creatinine trending down  Potassium elevated  Underwent HD yesterday 2.7 L removed  Glucose improved  Remains weak and fatigued  Family at bedside      Objective   Objective     Vitals:   Temp:  [97 °F (36.1 °C)-98.1 °F (36.7 °C)] 98.1 °F (36.7 °C)  Heart Rate:  [73-92] 77  Resp:  [14-20] 19  BP: (133-172)/(43-66) 145/59  Flow (L/min):  [3-3.5] 3.5    Physical Exam     Physical Exam            Vital Signs Reviewed   General: Chronically ill-appearing, elderly female, Alert, NAD, lying in bed.    Chest:  good aeration, clear to auscultation bilaterally, no work of breathing noted  CV: regular rate and rhythm regular  EXT:  no clubbing, no cyanosis, no edema  Neuro:  A&Ox3, moving all 4 extremities spontaneously  Skin: No rashes or lesions noted      Result Review    Result Review:  I have personally reviewed the results from the time of this admission to 2024 15:35 EDT and agree with these findings:  []  Laboratory list / accordion  []  Microbiology  []  Radiology  []  EKG/Telemetry   []  Cardiology/Vascular   []  Pathology  []  Old records  []  Other:        Assessment & Plan   Assessment / Plan     Active Hospital Problems:  Active Hospital Problems    Diagnosis     **ESRD (end stage renal disease) on dialysis     Hypoxia    Acute pulmonary edema  Acute on chronic hypoxic and hypercapnic respiratory failure  Hypertensive emergency with systolic blood pressures greater than 200-just preceding intubation blood  pressure was 206/98  Hyperkalemia     Plan:   -Continue to wean O2 to maintain SpO2 greater than 90%.  3 L baseline.  -Continue Pulmicort and as needed DuoNebs  -Start bronchopulmonary hygiene.  Encourage use of I-S.  -Cardiology on board.  Appreciate assistance for NSVT.  -Echocardiogram reviewed with normal EF, mild to moderate aortic valve stenosis, possible mitral valve stenosis  -Glucose improved.  Remains on Solu-Cortef 50 mg IV 3 times daily.  -Nephrology on board.  Appreciate assistance.  HD timing per them.  -Hyperkalemic.  Agree with Lokelma.  -Trend electrolytes and renal panel.  Replace electrolytes as needed.  -Encourage mobilization.  Out of bed to chair.     VTE Prophylaxis:  Pharmacologic & mechanical VTE prophylaxis orders are present.        CODE STATUS:    Code Status (Patient has no pulse and is not breathing): CPR (Attempt to Resuscitate)  Medical Interventions (Patient has pulse or is breathing): Full Support    Electronically signed by DAT Carballo, 08/24/24, 4:08 PM EDT.    This visit was performed by BOTH a physician and an APC. I personally evaluated and examined the patient.  And spent more than 51% of time caring for this patient I performed all aspects of MDM as documented. , I have reviewed and confirmed the accuracy of the patient's history as documented in this note. and I have reexamined the patient and the results are consistent with the previously documented exam. I have updated the documentation as necessary    Electronically signed by Regis Holcomb DO, 08/24/24, 5:27 PM EDT.

## 2024-08-24 NOTE — PROGRESS NOTES
Respiratory Therapist Broncho-Pulmonary Hygiene Progress Note      Patient Name:  Charlette Rai  YOB: 1935    Charlette Rai meets the qualification for Level 1 of the Bronco-Pulmonary Hygiene Protocol. This was based on my daily patient assessment and includes review of chest x-ray results, cough ability and quality, oxygenation, secretions or risk for secretion development and patient mobility.     Broncho-Pulmonary Hygiene Assessment:    Level of Movement: Actively changing positions without assistance  Alert/ oriented/ cooperative    Breath Sounds: Clear to slightly diminished    Cough: Strong, effective    Chest X-Ray: No CXR available    Sputum Productions: None or small amount of thin or watery secretions with effective cough    History and Physical: Home use of oxygen  and Chronic condition    SpO2 to Oxygen Need: greater than 92% on room air or  less than 3L nasal canula    Current SpO2 is: 100 on 3.5L   Patient is chronically on oxygen.      Based on this information, I have completed the following interventions: Teach/Instruct patient on cough and deep breathe      Electronically signed by Dina Chun RRT, 08/24/24, 4:23 PM EDT.

## 2024-08-24 NOTE — PROGRESS NOTES
" LOS: 3 days   Patient Care Team:  Brennan Mosqueda MD as PCP - General (Internal Medicine)  Regis Mckeon MD as Consulting Physician (Radiation Oncology)  Carlton Casillas MD as Consulting Physician (Gastroenterology)  Susan Marcos APRN as Nurse Practitioner (Gastroenterology)    Chief Complaint: ESRD    Subjective     History of Present Illness  Pt feeling tired but no particular other complaints.   No respiratory distress.    Subjective:  Symptoms:  Improved.  She reports weakness.  No shortness of breath or chest pain.    Diet:  No nausea or vomiting.    Activity level: Impaired due to weakness.    Pain:  She reports no pain.        History taken from: patient chart RN    Objective     Vital Sign Min/Max for last 24 hours  Temp  Min: 97 °F (36.1 °C)  Max: 99 °F (37.2 °C)   BP  Min: 92/59  Max: 183/61   Pulse  Min: 71  Max: 92   Resp  Min: 14  Max: 20   SpO2  Min: 90 %  Max: 100 %   Flow (L/min)  Min: 3  Max: 3.5   Weight  Min: 64.6 kg (142 lb 6.7 oz)  Max: 64.6 kg (142 lb 6.7 oz)     Flowsheet Rows      Flowsheet Row First Filed Value   Admission Height 165.1 cm (65\") Documented at 08/21/2024 1154   Admission Weight 65.9 kg (145 lb 4.5 oz) Documented at 08/21/2024 1154            No intake/output data recorded.  I/O last 3 completed shifts:  In: 480 [P.O.:480]  Out: 2700     Objective:  General Appearance:  Comfortable, in no acute distress and not in pain.    Vital signs: (most recent): Blood pressure 133/43, pulse 73, temperature 97.7 °F (36.5 °C), temperature source Oral, resp. rate 19, height 165.1 cm (65\"), weight 64.6 kg (142 lb 6.7 oz), SpO2 99%, not currently breastfeeding.  Vital signs are normal.  No fever.    HEENT: Normal HEENT exam.    Lungs:  Normal effort and normal respiratory rate.  She is not in respiratory distress.    Heart: Normal rate.  Regular rhythm.    Abdomen: Abdomen is soft.  Bowel sounds are normal.   There is no abdominal tenderness.     Extremities: Normal " "range of motion.  There is no dependent edema.  (Left upper extremity AV graft.  Patent.)  Pulses: Distal pulses are intact.    Neurological: Patient is alert and oriented to person, place and time.    Pupils:  Pupils are equal, round, and reactive to light.    Skin:  Warm and dry.                Results Review:     I reviewed the patient's new clinical results.  I reviewed the patient's new imaging results and agree with the interpretation.  I reviewed the patient's other test results and agree with the interpretation    WBC WBC   Date Value Ref Range Status   08/24/2024 5.95 3.40 - 10.80 10*3/mm3 Final   08/23/2024 8.55 3.40 - 10.80 10*3/mm3 Final   08/22/2024 11.88 (H) 3.40 - 10.80 10*3/mm3 Final   08/21/2024 8.00 3.40 - 10.80 10*3/mm3 Final      HGB Hemoglobin   Date Value Ref Range Status   08/24/2024 9.4 (L) 12.0 - 15.9 g/dL Final   08/23/2024 9.2 (L) 12.0 - 15.9 g/dL Final   08/22/2024 11.3 (L) 12.0 - 15.9 g/dL Final   08/21/2024 10.1 (L) 12.0 - 15.9 g/dL Final      HCT Hematocrit   Date Value Ref Range Status   08/24/2024 32.7 (L) 34.0 - 46.6 % Final   08/23/2024 32.1 (L) 34.0 - 46.6 % Final   08/22/2024 39.5 34.0 - 46.6 % Final   08/21/2024 34.0 34.0 - 46.6 % Final      Platlets No results found for: \"LABPLAT\"   MCV MCV   Date Value Ref Range Status   08/24/2024 105.5 (H) 79.0 - 97.0 fL Final   08/23/2024 103.9 (H) 79.0 - 97.0 fL Final   08/22/2024 104.8 (H) 79.0 - 97.0 fL Final   08/21/2024 101.8 (H) 79.0 - 97.0 fL Final          Sodium Sodium   Date Value Ref Range Status   08/24/2024 132 (L) 136 - 145 mmol/L Final   08/23/2024 133 (L) 136 - 145 mmol/L Final   08/23/2024 136 136 - 145 mmol/L Final   08/22/2024 136 136 - 145 mmol/L Final   08/21/2024 135 (L) 136 - 145 mmol/L Final   08/21/2024 135 (L) 136 - 145 mmol/L Final      Potassium Potassium   Date Value Ref Range Status   08/24/2024 5.2 3.5 - 5.2 mmol/L Final     Comment:     Slight hemolysis detected by analyzer. Result may be falsely elevated. " "  08/23/2024 4.6 3.5 - 5.2 mmol/L Final   08/23/2024 4.4 3.5 - 5.2 mmol/L Final   08/22/2024 4.9 3.5 - 5.2 mmol/L Final     Comment:     Slight hemolysis detected by analyzer. Result may be falsely elevated.   08/21/2024 5.5 (H) 3.5 - 5.2 mmol/L Final   08/21/2024 5.0 3.5 - 5.2 mmol/L Final      Chloride Chloride   Date Value Ref Range Status   08/24/2024 97 (L) 98 - 107 mmol/L Final   08/23/2024 97 (L) 98 - 107 mmol/L Final   08/23/2024 98 98 - 107 mmol/L Final   08/22/2024 97 (L) 98 - 107 mmol/L Final   08/21/2024 96 (L) 98 - 107 mmol/L Final   08/21/2024 94 (L) 98 - 107 mmol/L Final      CO2 CO2   Date Value Ref Range Status   08/24/2024 23.4 22.0 - 29.0 mmol/L Final   08/23/2024 26.9 22.0 - 29.0 mmol/L Final   08/23/2024 29.6 (H) 22.0 - 29.0 mmol/L Final   08/22/2024 29.0 22.0 - 29.0 mmol/L Final   08/21/2024 31.7 (H) 22.0 - 29.0 mmol/L Final   08/21/2024 34.3 (H) 22.0 - 29.0 mmol/L Final      BUN BUN   Date Value Ref Range Status   08/24/2024 15 8 - 23 mg/dL Final   08/23/2024 26 (H) 8 - 23 mg/dL Final   08/23/2024 22 8 - 23 mg/dL Final   08/22/2024 13 8 - 23 mg/dL Final   08/21/2024 23 8 - 23 mg/dL Final   08/21/2024 23 8 - 23 mg/dL Final      Creatinine Creatinine   Date Value Ref Range Status   08/24/2024 2.28 (H) 0.57 - 1.00 mg/dL Final   08/23/2024 3.25 (H) 0.57 - 1.00 mg/dL Final   08/23/2024 3.12 (H) 0.57 - 1.00 mg/dL Final   08/22/2024 2.29 (H) 0.57 - 1.00 mg/dL Final   08/21/2024 3.45 (H) 0.60 - 1.30 mg/dL Final   08/21/2024 3.46 (H) 0.57 - 1.00 mg/dL Final   08/21/2024 3.18 (H) 0.57 - 1.00 mg/dL Final      Calcium Calcium   Date Value Ref Range Status   08/24/2024 8.6 8.6 - 10.5 mg/dL Final   08/23/2024 8.1 (L) 8.6 - 10.5 mg/dL Final   08/23/2024 8.5 (L) 8.6 - 10.5 mg/dL Final   08/22/2024 8.6 8.6 - 10.5 mg/dL Final   08/21/2024 8.7 8.6 - 10.5 mg/dL Final   08/21/2024 8.4 (L) 8.6 - 10.5 mg/dL Final      PO4 No results found for: \"CAPO4\"   Albumin Albumin   Date Value Ref Range Status   08/24/2024 2.9 " "(L) 3.5 - 5.2 g/dL Final   08/23/2024 2.7 (L) 3.5 - 5.2 g/dL Final   08/23/2024 2.7 (L) 3.5 - 5.2 g/dL Final      Magnesium Magnesium   Date Value Ref Range Status   08/24/2024 1.9 1.6 - 2.4 mg/dL Final   08/23/2024 2.5 (H) 1.6 - 2.4 mg/dL Final   08/23/2024 1.8 1.6 - 2.4 mg/dL Final   08/22/2024 1.8 1.6 - 2.4 mg/dL Final      Uric Acid No results found for: \"URICACID\"     Medication Review:   aspirin, 81 mg, Oral, Daily  budesonide, 0.5 mg, Nebulization, BID - RT  carvedilol, 12.5 mg, Oral, BID With Meals  clopidogrel, 75 mg, Oral, Daily  heparin (porcine), 5,000 Units, Subcutaneous, Q12H  hydrALAZINE, 10 mg, Intravenous, Once  hydrocortisone sodium succinate, 50 mg, Intravenous, Q8H  levothyroxine, 25 mcg, Oral, Q AM  sodium chloride, 10 mL, Intravenous, Q12H  spironolactone, 25 mg, Oral, Every Other Day          Assessment & Plan       ESRD (end stage renal disease) on dialysis    Hypoxia      Assessment & Plan  - ESRD with pulmonary edema, severe hypertension and mild hyperkalemia.  Had stat dialysis on Wednesday through left upper extremity AV graft.  TDC removed.  Continue m/w/f.  K+ trending high, will add lokelma dailys also.    - Acute respiratory acidosis, possibly related to sedatives from earlier procedure.  Better, extubated.  Hemodynamically stable.    - Severe hypertension in the setting of acute pulmonary edema, she has prior history of renal artery stenosis, status post stenting.      - Anemia of CKD.  On long-acting CASSANDRA as outpatient.    - Hypothyroidism, per primary.    Elliott Aviles MD  08/24/24  08:08 EDT          "

## 2024-08-24 NOTE — CONSULTS
[facility]   CARDIOLOGY CONSULT NOTE    Patient Name: Charlette Rai  : 1935  MRN: [id number]    Primary Care Physician:  Brennan Mosqueda MD  Date of admission: 2024    Subjective   Subjective     Chief Complaint: NSVT    HPI:  Charlette Rai is a 89 y.o. female with history of HFpEF, moderate aortic stenosis, hyperlipidemia, hypothyroidism, ESRD on HD was consulted for 18 beats of NSVT today.  Patient was asymptomatic during the episode.  Patient was having dressing change by vascular surgeon during the episode.  Telemetry and EKG reviewed.  Patient currently asymptomatic.    Last echo on 2024 showing EF of 55%, mild to moderate concentric hypertrophy of left ventricular wall with sigmoid shaped ventricular septum, grade 1A left diastolic dysfunction along with moderate mitral and aortic valve stenosis; moderately elevated right ventricular systolic pressure.     Cardiology was consulted for 18 beats of nonsustained VT at which time patient was asymptomatic.  Patient denies chest pain, shortness of breath, palpitation, syncope or presyncope episodes.    Review of Systems  Negative except as mentioned in HPI above.     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 DENIES CP BUT DOES HAVE SOA CHRONIC ISSUE COPD/LUNG CANCER    COPD (chronic obstructive pulmonary disease)     FOLLOWED BY DR MARIAJOSE BAILEY     Coronary artery disease     DENIES CP BUT DOES GET SOA MOST OF THE TIME WITH EXERTION BUT OCC AT REST CHRONIC ISSUE COPD/LUNG CANCER    Deep vein thrombosis     Diabetes mellitus     DOES NOT CHECK BS DAILY    Disease of thyroid gland     HYPOTHYROIDISM    Essential hypertension     Gastric ulcer     GERD (gastroesophageal reflux disease)     Heart murmur     History of transfusion     NO ISSUES POST TRANSFUSION WAS MANY YEARS AGO    Hyperlipemia     Leukocytopenia     FOLLOWED BY DR MIR BAILEY    Limb swelling     Lumbago     Lumbar spinal stenosis     Lung cancer     Migraine headache     Multiple joint pain     On home oxygen therapy     3L/NC PRN    Osteopenia     PNA (pneumonia) 05/04/2024    Pseudomonas pneumonia 04/29/2024    Reflux esophagitis     Shortness of breath     Thyroid nodule     HAS ULTRASOUND YEARLY BEING MONITORED    Vascular disease     Vitamin D deficiency        Past Surgical History:   Procedure Laterality Date    ABDOMINAL SURGERY      ANGIOPLASTY RENAL ARTERY Left 06/14/2024    stent placement    APPENDECTOMY      ARTERIOGRAM N/A 6/14/2024    Procedure: Arteriogram, right radial access, aortogram and renal arteriogram with possible intervention.;  Surgeon: Dio Miguel MD;  Location: [facility] CATH INVASIVE LOCATION;  Service: Peripheral Vascular;  Laterality: N/A;    ARTERIOVENOUS FISTULA/SHUNT SURGERY Left 05/10/2022    Procedure: Left basilic vein transposition;  Surgeon: Wan Barksdale MD;  Location: [facility] MAIN OR;  Service: Vascular;  Laterality: Left;    ARTERIOVENOUS FISTULA/SHUNT SURGERY Left 03/23/2023    Procedure: Ligation of left arm arteriovenous fistula;  Surgeon: Wan Barksdale MD;  Location: [facility] MAIN OR;  Service: Vascular;  Laterality: Left;    ARTERIOVENOUS FISTULA/SHUNT SURGERY Left 11/30/2023    Procedure: Creation of left arm arteriovenous graft;  Surgeon: Wan Barksdale MD;  Location: [facility] MAIN OR;  Service: Vascular;  Laterality: Left;    BRONCHOSCOPY N/A  04/24/2024    Procedure: BRONCHOSCOPY WITH BAL AND WASHINGS;  Surgeon: Khalif Yanez MD;  Location: [facility] ENDOSCOPY;  Service: Pulmonary;  Laterality: N/A;  MUCUS PLUGGING    CARDIAC CATHETERIZATION  1996    CARDIAC SURGERY      CARDIAC SURGERY      fluid drained from heart    CATARACT EXTRACTION, BILATERAL  2003    COLONOSCOPY  2014    ENDOSCOPY  2016 2019    FEMORAL ARTERY STENT Bilateral     HYSTERECTOMY      LUNG BIOPSY Left 2005    lobectomy upper lung caner    LUNG VOLUME REDUCTION      OTHER SURGICAL HISTORY      artifical joints/limbs    REPLACEMENT TOTAL KNEE Left 2016    UPPER GASTROINTESTINAL ENDOSCOPY         Family History: family history includes Arthritis in her father and mother; Cancer in her brother, brother, brother, and brother; Diabetes in her brother; Other in her brother; Prostate cancer in her brother and brother. Otherwise pertinent FHx was reviewed and not pertinent to current issue.    Social History:  reports that she quit smoking about 20 years ago. Her smoking use included cigarettes. She started smoking about 72 years ago. She has a 52.5 pack-year smoking history. She has been exposed to tobacco smoke. She has never used smokeless tobacco. She reports that she does not drink alcohol and does not use drugs.    Home Medications:  Methylcellulose (Laxative), acetaminophen, albuterol sulfate HFA, amLODIPine, aspirin, budesonide, carvedilol, clopidogrel, dexlansoprazole, furosemide, ipratropium-albuterol, isosorbide mononitrate, levocetirizine, levothyroxine, linagliptin, losartan, nitroglycerin, ondansetron, rosuvastatin, saccharomyces boulardii, sevelamer, spironolactone, tobramycin, and vitamin D    Allergies:  No Known Allergies    Objective   Objective     Vitals:   Temp:  [97 °F (36.1 °C)-98.1 °F (36.7 °C)] 98.1 °F (36.7 °C)  Heart Rate:  [73-84] 81  Resp:  [16-20] 16  BP: (105-145)/(43-78) 132/75  Flow (L/min):  [3-4] 4  Physical Exam    Constitutional: Awake, alert   Eyes:  PERRLA, sclerae anicteric, no conjunctival injection   HENT: NCAT, mucous membranes moist   Neck: Supple, no thyromegaly, no lymphadenopathy, trachea midline, No JVD, No carotid bruit   Respiratory: Clear to auscultation bilaterally, nonlabored respirations    Cardiovascular: RRR, no murmurs, rubs, or gallops appreciated, palpable pedal pulses bilaterally   Gastrointestinal: Positive bowel sounds, soft, nontender, nondistended   Musculoskeletal: No ankle edema, no clubbing or cyanosis to extremities   Psychiatric: Appropriate affect, cooperative   Neurologic: Oriented x 3, strength symmetric in all extremities, Cranial Nerves grossly intact, speech is clear   Skin: Warm, Dry, No rashes, No palor    Result Review    Result Review:  I have personally reviewed the results from the time of this admission to 8/24/2024 20:48 EDT and agree with these findings:  [x]  Laboratory  []  Microbiology  []  Radiology  [x]  EKG/Telemetry   [x]  Cardiology/Vascular   []  Pathology  [x]  Old records  []  Other:      ECG 12 Lead Pre-Op / Pre-Procedure   Preliminary Result   HEART RATE=83  bpm   RR Uzkzeziz=788  ms   NC Qvrcmtue=936  ms   P Horizontal Axis=-3  deg   P Front Axis=111  deg   QRSD Interval=88  ms   QT Wdubesvu=777  ms   MIqR=337  ms   QRS Axis=93  deg   T Wave Axis=84  deg   - ABNORMAL ECG -   Right and left arm electrode reversal, interpretation assumes no reversal   Sinus rhythm   Consider left ventricular hypertrophy   Lateral infarct, old   Anterior Q waves, possibly due to LVH   Date and Time of Study:2024-08-21 16:29:41        Results for orders placed during the hospital encounter of 08/21/24    Adult Transthoracic Echo Limited W/ Cont if Necessary Per Protocol    Interpretation Summary    Left ventricular systolic function is low normal. Left ventricular ejection fraction appears to be 51 - 55%.    Left ventricular wall thickness is consistent with mild concentric hypertrophy.    The left atrial cavity is mild to  moderately dilated.    Aortic valve is moderately calcified.  Mild to moderate aortic valve stenosis is present.  The peak and mean gradient across aortic valve are 21/15 mmHg with a calculated valve area of 1.64 cm².    Mitral valve apparatus is calcified with restricted opening.  Possible mitral stenosis, unable to assess severity.  Adequate Doppler studies not obtained across the mitral valve.    No results found for this or any previous visit.    Lab Results   Component Value Date    CKTOTAL 51 03/12/2019    TROPONINT 58 (C) 08/21/2024     A1C Last 3 Results          4/19/2024    00:00 5/29/2024    00:00 7/17/2024    00:00   HGBA1C Last 3 Results   Hemoglobin A1C 5.7     5.4     5.4          Details          This result is from an external source.                   ASCVD SCORE  The ASCVD Risk score (Giselle DE LA ROSA, et al., 2019) failed to calculate for the following reasons:    The 2019 ASCVD risk score is only valid for ages 40 to 79    The patient has a prior MI or stroke diagnosis        Assessment & Plan   Assessment / Plan     Brief Patient Summary:  Charlette Rai is a 89 y.o. female who presented for elective tunneled dialysis catheter removal for ESRD.  Had 18 beats of NSVT today, was asymptomatic during the episode.  Telemetry was reviewed.  No such episodes thereafter.  Cardiology was consulted for 18 beats of NSVT.    Active Hospital Problems:  Active Hospital Problems    Diagnosis     **ESRD (end stage renal disease) on dialysis     NSVT (nonsustained ventricular tachycardia)     Hypoxia      Plan:   -Patient consulted for 18 beats of nonsustained VT during which time she was asymptomatic.  Patient currently on 12.5 mg twice daily of Coreg.  -Telemetry reviewed.  Patient had frequent episodes of desaturation which could have been a trigger for the nonsustained V. tach.  Patient also has moderate aortic stenosis on last echo from 5/2024.  Repeat echo showed preserved ejection fraction with unchanged  severity of aortic stenosis.  There is also mild mitral stenosis  -Continue current dose of Coreg.  Keep electrolytes replenished with potassium more than 4 and magnesium more than 2.    Patient can follow-up as outpatient in 6 to 8 weeks with outpatient 14-day Holter monitor to quantify PVC burden.  No further cardiac input at this time.    Cardiology will be available as needed.  Please feel free to contact if questions or concerns.    CODE STATUS:    Code Status (Patient has no pulse and is not breathing): CPR (Attempt to Resuscitate)  Medical Interventions (Patient has pulse or is breathing): Full Support       Benoit Urbina MD, FACC

## 2024-08-24 NOTE — PROGRESS NOTES
[facility]   Hospitalist Progress Note  Date: 2024  Patient Name: Charlette Rai  : 1935  MRN: [id number]  Date of admission: 2024  Room/Bed: [facility]/      Subjective   Subjective     Chief Complaint:   Consulted by vascular surgery, hypoxia following patient's tunneled dialysis catheter removal    Summary:  Charlette Rai is an 89 y.o. female with medical history of GERD, end-stage renal disease, coronary artery disease, peripheral vascular disease, hypertension, heart failure with preserved ejection fraction, renal artery stenosis, hypothyroidism, COPD chronically on 3 L nasal cannula     The patient presented to the hospital on 2024 for an elective tunneled dialysis catheter removal.  Patient with end-stage renal disease, had been using a right IJ tunneled dialysis catheter while her left upper extremity axial axilla AV loop graft was being optimized after pulling clots for short period of time.  Patient presented for removal of tunneled dialysis catheter, plan was to go to dialysis immediately following for a session.  Following patient's procedure patient became more somnolent, noted to be hypoxic on her 3 L nasal cannula with agonal breathing.  CODE BLUE was called, was told she never lost pulses.  Patient was intubated for airway protection.  Documentation also indicates patient had been hypertensive during her procedure with systolics greater than 200.  Following intubation patient was transferred to the ICU for ongoing care and management.  Pulmonary critical care consulted.  Patient's nephrologist also alerted.  Patient underwent dialysis with 4 L removal overnight with improvement in oxygenation.     Interval Followup:   Cardiology evaluated patient for a run of nonsustained V. tach yesterday.  Patient continues to feel weak this morning.  Encouraged being up and moving around as tolerated.  Echocardiogram still pending at this time.    Objective   Objective     Vitals:   Temp:   [97 °F (36.1 °C)-98.1 °F (36.7 °C)] 98.1 °F (36.7 °C)  Heart Rate:  [71-92] 77  Resp:  [14-20] 19  BP: ()/(43-66) 145/59  Flow (L/min):  [3-3.5] 3.5    Physical Exam               Constitutional: No acute distress, resting comfortably in bed answering questions appropriately              Eyes: Pupils equal, sclerae anicteric, no conjunctival injection              HENT: NCAT, mucous membranes moist              Neck: Supple, no thyromegaly, no lymphadenopathy, trachea midline              Respiratory: Clear to auscultation bilaterally, no wheezing              Cardiovascular: RRR, no murmurs, rubs, or gallops, palpable pedal pulses bilaterally              Gastrointestinal: Positive bowel sounds, soft, nondistended              Musculoskeletal: No bilateral ankle edema, no clubbing or cyanosis to extremities              Neurologic: Radial nerves grossly intact, awake alert and oriented.  Kyaw symmetric throughout       Result Review    Result Review:  I have personally reviewed these results:  [x]  Laboratory      Lab 08/24/24  0515 08/23/24  0233 08/22/24  0636 08/22/24  0532 08/21/24  1519 08/21/24  1152   WBC 5.95 8.55  --  11.88*  --  8.00   HEMOGLOBIN 9.4* 9.2*  --  11.3*  --  10.1*   HEMATOCRIT 32.7* 32.1*  --  39.5  --  34.0   PLATELETS 112* 127*  --  124*  --  154   NEUTROS ABS  --   --   --  8.80*  --  6.57   IMMATURE GRANS (ABS)  --   --   --  0.06*  --  0.03   LYMPHS ABS  --   --   --  0.87  --  0.60*   MONOS ABS  --   --   --  1.89*  --  0.71   EOS ABS  --   --   --  0.18  --  0.06   .5* 103.9*  --  104.8*  --  101.8*   LACTATE  --   --   --   --  <0.4  --    PROTIME  --   --  15.1*  --   --  14.6   APTT  --   --  31.9  --   --  28.7         Lab 08/24/24  0515 08/23/24  1209 08/23/24  0233 08/22/24  0532   SODIUM 132* 133* 136 136   POTASSIUM 5.2 4.6 4.4 4.9   CHLORIDE 97* 97* 98 97*   CO2 23.4 26.9 29.6* 29.0   ANION GAP 11.6 9.1 8.4 10.0   BUN 15 26* 22 13   CREATININE 2.28* 3.25* 3.12*  2.29*   EGFR 20.3* 13.3* 13.9* 20.2*   GLUCOSE 142* 139* 89 62*   CALCIUM 8.6 8.1* 8.5* 8.6   MAGNESIUM 1.9 2.5* 1.8 1.8   PHOSPHORUS 3.2  --  3.6 3.5         Lab 08/24/24  0515 08/23/24  1209 08/23/24  0233   TOTAL PROTEIN 5.9* 5.7* 5.4*   ALBUMIN 2.9* 2.7* 2.7*   GLOBULIN 3.0 3.0 2.7   ALT (SGPT) 9 6 7   AST (SGOT) 24 20 17   BILIRUBIN 0.4 0.3 0.3   ALK PHOS 191* 184* 172*         Lab 08/22/24  0636 08/21/24  1701 08/21/24  1516 08/21/24  1152   PROBNP  --   --  30,396.0*  --    HSTROP T  --  58* 55*  --    PROTIME 15.1*  --   --  14.6   INR 1.16*  --   --  1.12                 Lab 08/21/24  1519   PH, ARTERIAL 7.311*   PCO2, ARTERIAL 75.4*   PO2 .6*   O2 SATURATION .0*   FIO2 100   HCO3 ART 38.0*   BASE EXCESS ART 9.5*     Brief Urine Lab Results  (Last result in the past 365 days)        Color   Clarity   Blood   Leuk Est   Nitrite   Protein   CREAT   Urine HCG        05/01/24 2102 Dark Yellow   Cloudy   Negative   Small (1+)   Negative   >=300 mg/dL (3+)                 [x]  Microbiology   Microbiology Results (last 10 days)       ** No results found for the last 240 hours. **          [x]  Radiology  XR Chest 1 View    Result Date: 8/21/2024  Impression: 1.Tip of the endotracheal tube terminates in the upper thoracic trachea. The rebecca is not well visualized. 2.Diffusely increased bilateral airspace opacities, concerning for pulmonary edema or possibly multifocal pneumonia. 3.Stable bilateral scarring with left pleural plaques. Electronically Signed: Tanvi Alvarez  8/21/2024 3:46 PM EDT  Workstation ID: [id number]   []  EKG/Telemetry   []  Cardiology/Vascular   []  Pathology  []  Old records  []  Other:    Assessment & Plan   Assessment / Plan     Assessment:  Acute on chronic hypoxic and hypercapnic respiratory failure  Pulmonary edema  Nonsustained V. tach  Hypertensive emergency with systolics greater than 200  End-stage renal disease on dialysis  GERD  CAD  Peripheral vascular disease  Heart  failure with preserved ejection fraction  Renal artery stenosis  Hypothyroidism  COPD chronically on 3 L nasal cannula     Plan:  Patient admitted to the hospital for further care and management  Pulmonary critical care, nephrology, vascular surgery following, appreciate assistance  Consulting cardiology for NSVT, continue to monitor on telemetry  Continue to monitor electrolytes, goal potassium greater than 4 goal magnesium greater than 2  Echocardiogram is been ordered, will follow-up  Continues on dialysis schedule per nephrology  Patient remains stable on home 3 L nasal cannula  Continue to monitor hypoglycemia.  Started on steroids, low-dose hydrocortisone, will likely discontinue in a couple of days  Potassium uptrending, started with Lokelma per nephrology     Discussed case with bedside nurse, nephrologist       VTE Prophylaxis:  Pharmacologic & mechanical VTE prophylaxis orders are present.        CODE STATUS:   Code Status (Patient has no pulse and is not breathing): CPR (Attempt to Resuscitate)  Medical Interventions (Patient has pulse or is breathing): Full Support      Electronically signed by Miguel Dennison MD, 8/24/2024, 12:48 EDT.

## 2024-08-24 NOTE — PLAN OF CARE
Goal Outcome Evaluation:  Plan of Care Reviewed With: patient        Progress: improving  Outcome Evaluation: Patient has been complaining of back pain throughout shift, patient got mag run today per MD order, has slight wheezes in her bases, on 3L NC with no signs of distress. Will continue with plan of care.

## 2024-08-24 NOTE — PLAN OF CARE
Goal Outcome Evaluation:  Plan of Care Reviewed With: patient        Progress: improving     Pt alert and oriented x4. Arrived to floor today. Pt with frequent cough. Had problem with constipation the beginning of shift and senecot given and was effective. Pt tolerating oxygen via nasal cannula. Call light in reach and able to make needs known

## 2024-08-25 LAB
ANION GAP SERPL CALCULATED.3IONS-SCNC: 10.5 MMOL/L (ref 5–15)
BUN SERPL-MCNC: 33 MG/DL (ref 8–23)
BUN/CREAT SERPL: 10.9 (ref 7–25)
CALCIUM SPEC-SCNC: 8.2 MG/DL (ref 8.6–10.5)
CHLORIDE SERPL-SCNC: 98 MMOL/L (ref 98–107)
CO2 SERPL-SCNC: 25.5 MMOL/L (ref 22–29)
CREAT SERPL-MCNC: 3.04 MG/DL (ref 0.57–1)
DEPRECATED RDW RBC AUTO: 50.9 FL (ref 37–54)
EGFRCR SERPLBLD CKD-EPI 2021: 14.4 ML/MIN/1.73
ERYTHROCYTE [DISTWIDTH] IN BLOOD BY AUTOMATED COUNT: 14 % (ref 12.3–15.4)
GLUCOSE BLDC GLUCOMTR-MCNC: 134 MG/DL (ref 70–99)
GLUCOSE BLDC GLUCOMTR-MCNC: 146 MG/DL (ref 70–99)
GLUCOSE BLDC GLUCOMTR-MCNC: 157 MG/DL (ref 70–99)
GLUCOSE BLDC GLUCOMTR-MCNC: 181 MG/DL (ref 70–99)
GLUCOSE SERPL-MCNC: 143 MG/DL (ref 65–99)
HCT VFR BLD AUTO: 30.1 % (ref 34–46.6)
HGB BLD-MCNC: 8.9 G/DL (ref 12–15.9)
MAGNESIUM SERPL-MCNC: 2.7 MG/DL (ref 1.6–2.4)
MCH RBC QN AUTO: 29.5 PG (ref 26.6–33)
MCHC RBC AUTO-ENTMCNC: 29.6 G/DL (ref 31.5–35.7)
MCV RBC AUTO: 99.7 FL (ref 79–97)
PHOSPHATE SERPL-MCNC: 3 MG/DL (ref 2.5–4.5)
PLATELET # BLD AUTO: 154 10*3/MM3 (ref 140–450)
PMV BLD AUTO: 10.2 FL (ref 6–12)
POTASSIUM SERPL-SCNC: 4.8 MMOL/L (ref 3.5–5.2)
RBC # BLD AUTO: 3.02 10*6/MM3 (ref 3.77–5.28)
SODIUM SERPL-SCNC: 134 MMOL/L (ref 136–145)
WBC NRBC COR # BLD AUTO: 7.66 10*3/MM3 (ref 3.4–10.8)

## 2024-08-25 PROCEDURE — 80048 BASIC METABOLIC PNL TOTAL CA: CPT | Performed by: INTERNAL MEDICINE

## 2024-08-25 PROCEDURE — 94799 UNLISTED PULMONARY SVC/PX: CPT

## 2024-08-25 PROCEDURE — 25010000002 HEPARIN (PORCINE) PER 1000 UNITS: Performed by: SURGERY

## 2024-08-25 PROCEDURE — 84100 ASSAY OF PHOSPHORUS: CPT | Performed by: PHYSICIAN ASSISTANT

## 2024-08-25 PROCEDURE — 94664 DEMO&/EVAL PT USE INHALER: CPT

## 2024-08-25 PROCEDURE — 82948 REAGENT STRIP/BLOOD GLUCOSE: CPT

## 2024-08-25 PROCEDURE — 25010000002 HYDROCORTISONE SOD SUC (PF) 100 MG RECONSTITUTED SOLUTION: Performed by: PHYSICIAN ASSISTANT

## 2024-08-25 PROCEDURE — 85027 COMPLETE CBC AUTOMATED: CPT | Performed by: INTERNAL MEDICINE

## 2024-08-25 PROCEDURE — 83735 ASSAY OF MAGNESIUM: CPT | Performed by: PHYSICIAN ASSISTANT

## 2024-08-25 PROCEDURE — 99232 SBSQ HOSP IP/OBS MODERATE 35: CPT | Performed by: INTERNAL MEDICINE

## 2024-08-25 RX ADMIN — Medication 10 ML: at 08:27

## 2024-08-25 RX ADMIN — CLOPIDOGREL BISULFATE 75 MG: 75 TABLET ORAL at 08:26

## 2024-08-25 RX ADMIN — HYDROCORTISONE SODIUM SUCCINATE 50 MG: 100 INJECTION, POWDER, FOR SOLUTION INTRAMUSCULAR; INTRAVENOUS at 08:26

## 2024-08-25 RX ADMIN — IPRATROPIUM BROMIDE AND ALBUTEROL SULFATE 3 ML: .5; 3 SOLUTION RESPIRATORY (INHALATION) at 22:02

## 2024-08-25 RX ADMIN — Medication 10 ML: at 22:39

## 2024-08-25 RX ADMIN — LEVOTHYROXINE SODIUM 25 MCG: 0.03 TABLET ORAL at 06:27

## 2024-08-25 RX ADMIN — BUDESONIDE 0.5 MG: 0.5 SUSPENSION RESPIRATORY (INHALATION) at 09:39

## 2024-08-25 RX ADMIN — CARVEDILOL 12.5 MG: 12.5 TABLET, FILM COATED ORAL at 17:44

## 2024-08-25 RX ADMIN — HEPARIN SODIUM 5000 UNITS: 5000 INJECTION INTRAVENOUS; SUBCUTANEOUS at 22:38

## 2024-08-25 RX ADMIN — CARVEDILOL 12.5 MG: 12.5 TABLET, FILM COATED ORAL at 08:26

## 2024-08-25 RX ADMIN — SODIUM ZIRCONIUM CYCLOSILICATE 10 G: 10 POWDER, FOR SUSPENSION ORAL at 08:26

## 2024-08-25 RX ADMIN — ASPIRIN 81 MG: 81 TABLET, COATED ORAL at 08:26

## 2024-08-25 RX ADMIN — BUDESONIDE 0.5 MG: 0.5 SUSPENSION RESPIRATORY (INHALATION) at 22:02

## 2024-08-25 RX ADMIN — HEPARIN SODIUM 5000 UNITS: 5000 INJECTION INTRAVENOUS; SUBCUTANEOUS at 08:26

## 2024-08-25 NOTE — PROGRESS NOTES
[facility]   Hospitalist Progress Note  Date: 2024  Patient Name: Charlette Rai  : 1935  MRN: [id number]  Date of admission: 2024  Room/Bed: [facility]/      Subjective   Subjective     Chief Complaint:   Consulted by vascular surgery, hypoxia following patient's tunneled dialysis catheter removal    Summary:  Charlette Rai is an 89 y.o. female with medical history of GERD, end-stage renal disease, coronary artery disease, peripheral vascular disease, hypertension, heart failure with preserved ejection fraction, renal artery stenosis, hypothyroidism, COPD chronically on 3 L nasal cannula     The patient presented to the hospital on 2024 for an elective tunneled dialysis catheter removal.  Patient with end-stage renal disease, had been using a right IJ tunneled dialysis catheter while her left upper extremity axial axilla AV loop graft was being optimized after pulling clots for short period of time.  Patient presented for removal of tunneled dialysis catheter, plan was to go to dialysis immediately following for a session.  Following patient's procedure patient became more somnolent, noted to be hypoxic on her 3 L nasal cannula with agonal breathing.  CODE BLUE was called, was told she never lost pulses.  Patient was intubated for airway protection.  Documentation also indicates patient had been hypertensive during her procedure with systolics greater than 200.  Following intubation patient was transferred to the ICU for ongoing care and management.  Pulmonary critical care consulted.  Patient's nephrologist also alerted.  Patient underwent dialysis with 4 L removal overnight with improvement in oxygenation.     Interval Followup:   No acute events overnight.  No events noted on telemetry.  Radiology was able to evaluate patient yesterday.  Made a follow-up with cardiology as an outpatient.    Objective   Objective     Vitals:   Temp:  [97.3 °F (36.3 °C)-98.1 °F (36.7 °C)] 98.1 °F (36.7  °C)  Heart Rate:  [76-81] 76  Resp:  [16-20] 19  BP: (132-162)/(46-75) 162/51  Flow (L/min):  [4] 4    Physical Exam               Constitutional: No acute distress, resting comfortably in bed answering questions appropriately              Eyes: Pupils equal, sclerae anicteric, no conjunctival injection              HENT: NCAT, mucous membranes moist              Neck: Supple, no thyromegaly, no lymphadenopathy, trachea midline              Respiratory: Clear to auscultation bilaterally, no wheezing              Cardiovascular: RRR, no murmurs, rubs, or gallops, palpable pedal pulses bilaterally              Gastrointestinal: Positive bowel sounds, soft, nondistended              Musculoskeletal: No bilateral ankle edema, no clubbing or cyanosis to extremities              Neurologic: Cranial nerves grossly intact, awake alert and oriented.       Result Review    Result Review:  I have personally reviewed these results:  [x]  Laboratory      Lab 08/25/24  0416 08/24/24  0515 08/23/24  0233 08/22/24  0636 08/22/24  0532 08/21/24  1519 08/21/24  1152   WBC 7.66 5.95 8.55  --  11.88*  --  8.00   HEMOGLOBIN 8.9* 9.4* 9.2*  --  11.3*  --  10.1*   HEMATOCRIT 30.1* 32.7* 32.1*  --  39.5  --  34.0   PLATELETS 154 112* 127*  --  124*  --  154   NEUTROS ABS  --   --   --   --  8.80*  --  6.57   IMMATURE GRANS (ABS)  --   --   --   --  0.06*  --  0.03   LYMPHS ABS  --   --   --   --  0.87  --  0.60*   MONOS ABS  --   --   --   --  1.89*  --  0.71   EOS ABS  --   --   --   --  0.18  --  0.06   MCV 99.7* 105.5* 103.9*  --  104.8*  --  101.8*   LACTATE  --   --   --   --   --  <0.4  --    PROTIME  --   --   --  15.1*  --   --  14.6   APTT  --   --   --  31.9  --   --  28.7         Lab 08/25/24  0416 08/24/24  0515 08/23/24  1209 08/23/24  0233   SODIUM 134* 132* 133* 136   POTASSIUM 4.8 5.2 4.6 4.4   CHLORIDE 98 97* 97* 98   CO2 25.5 23.4 26.9 29.6*   ANION GAP 10.5 11.6 9.1 8.4   BUN 33* 15 26* 22   CREATININE 3.04* 2.28* 3.25*  3.12*   EGFR 14.4* 20.3* 13.3* 13.9*   GLUCOSE 143* 142* 139* 89   CALCIUM 8.2* 8.6 8.1* 8.5*   MAGNESIUM 2.7* 1.9 2.5* 1.8   PHOSPHORUS 3.0 3.2  --  3.6         Lab 08/24/24  0515 08/23/24  1209 08/23/24  0233   TOTAL PROTEIN 5.9* 5.7* 5.4*   ALBUMIN 2.9* 2.7* 2.7*   GLOBULIN 3.0 3.0 2.7   ALT (SGPT) 9 6 7   AST (SGOT) 24 20 17   BILIRUBIN 0.4 0.3 0.3   ALK PHOS 191* 184* 172*         Lab 08/22/24  0636 08/21/24  1701 08/21/24  1516 08/21/24  1152   PROBNP  --   --  30,396.0*  --    HSTROP T  --  58* 55*  --    PROTIME 15.1*  --   --  14.6   INR 1.16*  --   --  1.12                 Lab 08/21/24  1519   PH, ARTERIAL 7.311*   PCO2, ARTERIAL 75.4*   PO2 .6*   O2 SATURATION .0*   FIO2 100   HCO3 ART 38.0*   BASE EXCESS ART 9.5*     Brief Urine Lab Results  (Last result in the past 365 days)        Color   Clarity   Blood   Leuk Est   Nitrite   Protein   CREAT   Urine HCG        05/01/24 2102 Dark Yellow   Cloudy   Negative   Small (1+)   Negative   >=300 mg/dL (3+)                 [x]  Microbiology   Microbiology Results (last 10 days)       ** No results found for the last 240 hours. **          [x]  Radiology  XR Chest 1 View    Result Date: 8/21/2024  Impression: 1.Tip of the endotracheal tube terminates in the upper thoracic trachea. The rebecca is not well visualized. 2.Diffusely increased bilateral airspace opacities, concerning for pulmonary edema or possibly multifocal pneumonia. 3.Stable bilateral scarring with left pleural plaques. Electronically Signed: Tanvi Alvarez  8/21/2024 3:46 PM EDT  Workstation ID: [id number]   []  EKG/Telemetry   []  Cardiology/Vascular   []  Pathology  []  Old records  []  Other:    Assessment & Plan   Assessment / Plan     Assessment:  Acute on chronic hypoxic and hypercapnic respiratory failure  Pulmonary edema  Nonsustained V. tach  Hypertensive emergency with systolics greater than 200  End-stage renal disease on dialysis  GERD  CAD  Peripheral vascular  disease  Heart failure with preserved ejection fraction  Renal artery stenosis  Hypothyroidism  COPD chronically on 3 L nasal cannula     Plan:  Patient admitted to the hospital for further care and management  Pulmonary critical care, nephrology, vascular surgery following, appreciate assistance  Consulting cardiology for NSVT, continue to monitor on telemetry  Continue to monitor electrolytes, goal potassium greater than 4 goal magnesium greater than 2  Cardiogram has resulted, unchanged severity of aortic stenosis.  Patient to follow-up in cardiology clinic  Continues on dialysis schedule per nephrology, plan for dialysis tomorrow  Patient remains stable on home 3 L nasal cannula  Continue to monitor hypoglycemia.  Discontinuing low-dose hydrocortisone and monitoring patient's glucose  Potassium uptrending, started with Lokelma per nephrology     Discussed case with bedside nurse       VTE Prophylaxis:  Pharmacologic & mechanical VTE prophylaxis orders are present.        CODE STATUS:   Code Status (Patient has no pulse and is not breathing): CPR (Attempt to Resuscitate)  Medical Interventions (Patient has pulse or is breathing): Full Support      Electronically signed by Miguel Dennison MD, 8/25/2024, 15:37 EDT.

## 2024-08-25 NOTE — PLAN OF CARE
Goal Outcome Evaluation:  Plan of Care Reviewed With: patient        Progress: improving  Outcome Evaluation: patient alert and oriented, patient is expecting to be discharged tomorrow after dialysis, patient has had multiple bowel movements today up to bedside commode, still on 3L NC with no signs of distress. Family is at bedside. Will continue with plan of care.

## 2024-08-25 NOTE — PROGRESS NOTES
" LOS: 4 days   Patient Care Team:  Brennan Mosqueda MD as PCP - General (Internal Medicine)  Regis Mckeon MD as Consulting Physician (Radiation Oncology)  Carlton Casillas MD as Consulting Physician (Gastroenterology)  Susan Marcos APRN as Nurse Practitioner (Gastroenterology)    Chief Complaint: ESRD    Subjective     History of Present Illness  Pt feeling tired but no particular other complaints.   No respiratory distress.    Subjective:  Symptoms:  Improved.  She reports weakness.  No shortness of breath or chest pain.    Diet:  No nausea or vomiting.    Activity level: Impaired due to weakness.    Pain:  She reports no pain.        History taken from: patient chart RN    Objective     Vital Sign Min/Max for last 24 hours  Temp  Min: 97.3 °F (36.3 °C)  Max: 98.1 °F (36.7 °C)   BP  Min: 105/78  Max: 149/46   Pulse  Min: 77  Max: 84   Resp  Min: 16  Max: 20   SpO2  Min: 98 %  Max: 100 %   Flow (L/min)  Min: 4  Max: 4   No data recorded     Flowsheet Rows      Flowsheet Row First Filed Value   Admission Height 165.1 cm (65\") Documented at 08/21/2024 1154   Admission Weight 65.9 kg (145 lb 4.5 oz) Documented at 08/21/2024 1154            No intake/output data recorded.  I/O last 3 completed shifts:  In: 480 [P.O.:480]  Out: 50 [Urine:50]    Objective:  General Appearance:  Comfortable, in no acute distress and not in pain.    Vital signs: (most recent): Blood pressure 149/46, pulse 78, temperature 97.7 °F (36.5 °C), temperature source Oral, resp. rate 20, height 165.1 cm (65\"), weight 64.6 kg (142 lb 6.7 oz), SpO2 99%, not currently breastfeeding.  Vital signs are normal.  No fever.    HEENT: Normal HEENT exam.    Lungs:  Normal effort and normal respiratory rate.  She is not in respiratory distress.    Heart: Normal rate.  Regular rhythm.    Abdomen: Abdomen is soft.  Bowel sounds are normal.   There is no abdominal tenderness.     Extremities: Normal range of motion.  There is no dependent " "edema.  (Left upper extremity AV graft.  Patent.)  Pulses: Distal pulses are intact.    Neurological: Patient is alert and oriented to person, place and time.    Pupils:  Pupils are equal, round, and reactive to light.    Skin:  Warm and dry.                Results Review:     I reviewed the patient's new clinical results.  I reviewed the patient's new imaging results and agree with the interpretation.  I reviewed the patient's other test results and agree with the interpretation    WBC WBC   Date Value Ref Range Status   08/25/2024 7.66 3.40 - 10.80 10*3/mm3 Final   08/24/2024 5.95 3.40 - 10.80 10*3/mm3 Final   08/23/2024 8.55 3.40 - 10.80 10*3/mm3 Final      HGB Hemoglobin   Date Value Ref Range Status   08/25/2024 8.9 (L) 12.0 - 15.9 g/dL Final   08/24/2024 9.4 (L) 12.0 - 15.9 g/dL Final   08/23/2024 9.2 (L) 12.0 - 15.9 g/dL Final      HCT Hematocrit   Date Value Ref Range Status   08/25/2024 30.1 (L) 34.0 - 46.6 % Final   08/24/2024 32.7 (L) 34.0 - 46.6 % Final   08/23/2024 32.1 (L) 34.0 - 46.6 % Final      Platlets No results found for: \"LABPLAT\"   MCV MCV   Date Value Ref Range Status   08/25/2024 99.7 (H) 79.0 - 97.0 fL Final   08/24/2024 105.5 (H) 79.0 - 97.0 fL Final   08/23/2024 103.9 (H) 79.0 - 97.0 fL Final          Sodium Sodium   Date Value Ref Range Status   08/25/2024 134 (L) 136 - 145 mmol/L Final   08/24/2024 132 (L) 136 - 145 mmol/L Final   08/23/2024 133 (L) 136 - 145 mmol/L Final   08/23/2024 136 136 - 145 mmol/L Final      Potassium Potassium   Date Value Ref Range Status   08/25/2024 4.8 3.5 - 5.2 mmol/L Final   08/24/2024 5.2 3.5 - 5.2 mmol/L Final     Comment:     Slight hemolysis detected by analyzer. Result may be falsely elevated.   08/23/2024 4.6 3.5 - 5.2 mmol/L Final   08/23/2024 4.4 3.5 - 5.2 mmol/L Final      Chloride Chloride   Date Value Ref Range Status   08/25/2024 98 98 - 107 mmol/L Final   08/24/2024 97 (L) 98 - 107 mmol/L Final   08/23/2024 97 (L) 98 - 107 mmol/L Final " "  08/23/2024 98 98 - 107 mmol/L Final      CO2 CO2   Date Value Ref Range Status   08/25/2024 25.5 22.0 - 29.0 mmol/L Final   08/24/2024 23.4 22.0 - 29.0 mmol/L Final   08/23/2024 26.9 22.0 - 29.0 mmol/L Final   08/23/2024 29.6 (H) 22.0 - 29.0 mmol/L Final      BUN BUN   Date Value Ref Range Status   08/25/2024 33 (H) 8 - 23 mg/dL Final   08/24/2024 15 8 - 23 mg/dL Final   08/23/2024 26 (H) 8 - 23 mg/dL Final   08/23/2024 22 8 - 23 mg/dL Final      Creatinine Creatinine   Date Value Ref Range Status   08/25/2024 3.04 (H) 0.57 - 1.00 mg/dL Final   08/24/2024 2.28 (H) 0.57 - 1.00 mg/dL Final   08/23/2024 3.25 (H) 0.57 - 1.00 mg/dL Final   08/23/2024 3.12 (H) 0.57 - 1.00 mg/dL Final      Calcium Calcium   Date Value Ref Range Status   08/25/2024 8.2 (L) 8.6 - 10.5 mg/dL Final   08/24/2024 8.6 8.6 - 10.5 mg/dL Final   08/23/2024 8.1 (L) 8.6 - 10.5 mg/dL Final   08/23/2024 8.5 (L) 8.6 - 10.5 mg/dL Final      PO4 No results found for: \"CAPO4\"   Albumin Albumin   Date Value Ref Range Status   08/24/2024 2.9 (L) 3.5 - 5.2 g/dL Final   08/23/2024 2.7 (L) 3.5 - 5.2 g/dL Final   08/23/2024 2.7 (L) 3.5 - 5.2 g/dL Final      Magnesium Magnesium   Date Value Ref Range Status   08/25/2024 2.7 (H) 1.6 - 2.4 mg/dL Final   08/24/2024 1.9 1.6 - 2.4 mg/dL Final   08/23/2024 2.5 (H) 1.6 - 2.4 mg/dL Final   08/23/2024 1.8 1.6 - 2.4 mg/dL Final      Uric Acid No results found for: \"URICACID\"     Medication Review:   aspirin, 81 mg, Oral, Daily  budesonide, 0.5 mg, Nebulization, BID - RT  carvedilol, 12.5 mg, Oral, BID With Meals  clopidogrel, 75 mg, Oral, Daily  heparin (porcine), 5,000 Units, Subcutaneous, Q12H  hydrALAZINE, 10 mg, Intravenous, Once  hydrocortisone sodium succinate, 50 mg, Intravenous, Q8H  levothyroxine, 25 mcg, Oral, Q AM  sodium chloride, 10 mL, Intravenous, Q12H  sodium zirconium cyclosilicate, 10 g, Oral, Daily  spironolactone, 25 mg, Oral, Every Other Day          Assessment & Plan       ESRD (end stage renal " disease) on dialysis    Hypoxia    NSVT (nonsustained ventricular tachycardia)      Assessment & Plan  - ESRD with pulmonary edema, severe hypertension and mild hyperkalemia.  Had stat dialysis on Wednesday through left upper extremity AV graft.  TDC removed.  Continue m/w/f.      - Acute respiratory acidosis, possibly related to sedatives from earlier procedure.  Better, extubated.  Hemodynamically stable.    - Severe hypertension in the setting of acute pulmonary edema, she has prior history of renal artery stenosis, status post stenting.      - Anemia of CKD.  On long-acting CASSANDRA as outpatient.    - Hypothyroidism, per primary.    Elliott Aviles MD  08/25/24  10:29 EDT

## 2024-08-25 NOTE — PLAN OF CARE
Goal Outcome Evaluation:              Outcome Evaluation: Patient A&Ox4, pleasant. Patient with c/o back pain, prn tylenol administered per md order. Patient remains on 3-4L O2 via NC throughout shift. Patient has rested quietly for most of shift, no significant changes throughout shift.

## 2024-08-26 ENCOUNTER — READMISSION MANAGEMENT (OUTPATIENT)
Dept: CALL CENTER | Facility: HOSPITAL | Age: 89
End: 2024-08-26
Payer: MEDICARE

## 2024-08-26 ENCOUNTER — TELEPHONE (OUTPATIENT)
Dept: CARDIOLOGY | Facility: CLINIC | Age: 89
End: 2024-08-26

## 2024-08-26 VITALS
BODY MASS INDEX: 23.73 KG/M2 | DIASTOLIC BLOOD PRESSURE: 82 MMHG | HEIGHT: 65 IN | TEMPERATURE: 97.6 F | OXYGEN SATURATION: 98 % | HEART RATE: 73 BPM | WEIGHT: 142.42 LBS | SYSTOLIC BLOOD PRESSURE: 128 MMHG | RESPIRATION RATE: 16 BRPM

## 2024-08-26 LAB
ANION GAP SERPL CALCULATED.3IONS-SCNC: 8.1 MMOL/L (ref 5–15)
BUN SERPL-MCNC: 48 MG/DL (ref 8–23)
BUN/CREAT SERPL: 13.2 (ref 7–25)
CALCIUM SPEC-SCNC: 8.3 MG/DL (ref 8.6–10.5)
CHLORIDE SERPL-SCNC: 100 MMOL/L (ref 98–107)
CO2 SERPL-SCNC: 25.9 MMOL/L (ref 22–29)
CREAT SERPL-MCNC: 3.65 MG/DL (ref 0.57–1)
DEPRECATED RDW RBC AUTO: 51.1 FL (ref 37–54)
EGFRCR SERPLBLD CKD-EPI 2021: 11.6 ML/MIN/1.73
ERYTHROCYTE [DISTWIDTH] IN BLOOD BY AUTOMATED COUNT: 14.3 % (ref 12.3–15.4)
GLUCOSE BLDC GLUCOMTR-MCNC: 138 MG/DL (ref 70–99)
GLUCOSE BLDC GLUCOMTR-MCNC: 92 MG/DL (ref 70–99)
GLUCOSE SERPL-MCNC: 105 MG/DL (ref 65–99)
HCT VFR BLD AUTO: 28.5 % (ref 34–46.6)
HGB BLD-MCNC: 8.7 G/DL (ref 12–15.9)
MAGNESIUM SERPL-MCNC: 2.5 MG/DL (ref 1.6–2.4)
MCH RBC QN AUTO: 29.8 PG (ref 26.6–33)
MCHC RBC AUTO-ENTMCNC: 30.5 G/DL (ref 31.5–35.7)
MCV RBC AUTO: 97.6 FL (ref 79–97)
PLATELET # BLD AUTO: 159 10*3/MM3 (ref 140–450)
PMV BLD AUTO: 10 FL (ref 6–12)
POTASSIUM SERPL-SCNC: 4.2 MMOL/L (ref 3.5–5.2)
RBC # BLD AUTO: 2.92 10*6/MM3 (ref 3.77–5.28)
SODIUM SERPL-SCNC: 134 MMOL/L (ref 136–145)
WBC NRBC COR # BLD AUTO: 6.61 10*3/MM3 (ref 3.4–10.8)

## 2024-08-26 PROCEDURE — 99239 HOSP IP/OBS DSCHRG MGMT >30: CPT | Performed by: INTERNAL MEDICINE

## 2024-08-26 PROCEDURE — 85027 COMPLETE CBC AUTOMATED: CPT | Performed by: INTERNAL MEDICINE

## 2024-08-26 PROCEDURE — 82948 REAGENT STRIP/BLOOD GLUCOSE: CPT | Performed by: INTERNAL MEDICINE

## 2024-08-26 PROCEDURE — 83735 ASSAY OF MAGNESIUM: CPT | Performed by: INTERNAL MEDICINE

## 2024-08-26 PROCEDURE — 80048 BASIC METABOLIC PNL TOTAL CA: CPT | Performed by: INTERNAL MEDICINE

## 2024-08-26 RX ADMIN — CARVEDILOL 12.5 MG: 12.5 TABLET, FILM COATED ORAL at 14:19

## 2024-08-26 RX ADMIN — LEVOTHYROXINE SODIUM 25 MCG: 0.03 TABLET ORAL at 05:50

## 2024-08-26 RX ADMIN — ASPIRIN 81 MG: 81 TABLET, COATED ORAL at 14:19

## 2024-08-26 RX ADMIN — CLOPIDOGREL BISULFATE 75 MG: 75 TABLET ORAL at 14:19

## 2024-08-26 RX ADMIN — ACETAMINOPHEN 650 MG: 325 TABLET ORAL at 05:53

## 2024-08-26 RX ADMIN — SPIRONOLACTONE 25 MG: 25 TABLET ORAL at 14:19

## 2024-08-26 NOTE — PLAN OF CARE
Problem: Adult Inpatient Plan of Care  Goal: Plan of Care Review  Outcome: Ongoing, Progressing  Goal: Patient-Specific Goal (Individualized)  Outcome: Ongoing, Progressing  Goal: Absence of Hospital-Acquired Illness or Injury  Outcome: Ongoing, Progressing  Intervention: Identify and Manage Fall Risk  Recent Flowsheet Documentation  Taken 8/26/2024 0802 by Lesia Velez RN  Safety Promotion/Fall Prevention: safety round/check completed  Taken 8/26/2024 0700 by Lesia Velez RN  Safety Promotion/Fall Prevention: safety round/check completed  Intervention: Prevent Skin Injury  Recent Flowsheet Documentation  Taken 8/26/2024 0802 by Lesia Velez RN  Body Position: position changed independently  Taken 8/26/2024 0700 by Lesia Velez RN  Body Position: position changed independently  Goal: Optimal Comfort and Wellbeing  Outcome: Ongoing, Progressing  Intervention: Monitor Pain and Promote Comfort  Recent Flowsheet Documentation  Taken 8/26/2024 0700 by Lesia Velez RN  Pain Management Interventions: see MAR  Goal: Readiness for Transition of Care  Outcome: Ongoing, Progressing     Problem: COPD (Chronic Obstructive Pulmonary Disease) Comorbidity  Goal: Maintenance of COPD Symptom Control  Outcome: Ongoing, Progressing     Problem: Heart Failure Comorbidity  Goal: Maintenance of Heart Failure Symptom Control  Outcome: Ongoing, Progressing     Problem: Hypertension Comorbidity  Goal: Blood Pressure in Desired Range  Outcome: Ongoing, Progressing     Problem: Skin Injury Risk Increased  Goal: Skin Health and Integrity  Outcome: Ongoing, Progressing     Problem: Fall Injury Risk  Goal: Absence of Fall and Fall-Related Injury  Outcome: Ongoing, Progressing  Intervention: Promote Injury-Free Environment  Recent Flowsheet Documentation  Taken 8/26/2024 0802 by Lesia Velez RN  Safety Promotion/Fall Prevention: safety round/check completed  Taken 8/26/2024 0700 by Lesia Velez RN  Safety  Promotion/Fall Prevention: safety round/check completed     Problem: Device-Related Complication Risk (Hemodialysis)  Goal: Safe, Effective Therapy Delivery  Outcome: Ongoing, Progressing     Problem: Hemodynamic Instability (Hemodialysis)  Goal: Effective Tissue Perfusion  Outcome: Ongoing, Progressing     Problem: Infection (Hemodialysis)  Goal: Absence of Infection Signs and Symptoms  Outcome: Ongoing, Progressing   Goal Outcome Evaluation:

## 2024-08-26 NOTE — PROGRESS NOTES
" LOS: 5 days   Patient Care Team:  Brennan Mosqueda MD as PCP - General (Internal Medicine)  Regis Mckeon MD as Consulting Physician (Radiation Oncology)  Carlton Casillas MD as Consulting Physician (Gastroenterology)  Susan Marcos APRN as Nurse Practitioner (Gastroenterology)    Chief Complaint: ESRD    Subjective     History of Present Illness  Pt feeling tired but no particular other complaints.   No respiratory distress.  Seen earlier today on dialysis.  Hoping to go home today.      Subjective:  Symptoms:  Improved.  She reports weakness.  No shortness of breath or chest pain.    Diet:  No nausea or vomiting.    Activity level: Impaired due to weakness.    Pain:  She reports no pain.        History taken from: patient chart RN    Objective     Vital Sign Min/Max for last 24 hours  Temp  Min: 97.3 °F (36.3 °C)  Max: 98.2 °F (36.8 °C)   BP  Min: 92/78  Max: 188/67   Pulse  Min: 69  Max: 86   Resp  Min: 16  Max: 18   SpO2  Min: 98 %  Max: 100 %   Flow (L/min)  Min: 4  Max: 4   No data recorded     Flowsheet Rows      Flowsheet Row First Filed Value   Admission Height 165.1 cm (65\") Documented at 08/21/2024 1154   Admission Weight 65.9 kg (145 lb 4.5 oz) Documented at 08/21/2024 1154            I/O this shift:  In: -   Out: 2000   I/O last 3 completed shifts:  In: -   Out: 50 [Urine:50]    Objective:  General Appearance:  Comfortable, in no acute distress and not in pain.    Vital signs: (most recent): Blood pressure 128/82, pulse 73, temperature 97.6 °F (36.4 °C), temperature source Oral, resp. rate 16, height 165.1 cm (65\"), weight 64.6 kg (142 lb 6.7 oz), SpO2 98%, not currently breastfeeding.  Vital signs are normal.  No fever.    HEENT: Normal HEENT exam.    Lungs:  Normal effort and normal respiratory rate.  She is not in respiratory distress.    Heart: Normal rate.  Regular rhythm.    Abdomen: Abdomen is soft.  Bowel sounds are normal.   There is no abdominal tenderness.   " "  Extremities: Normal range of motion.  There is no dependent edema.  (Left upper extremity AV graft.  Patent.)  Pulses: Distal pulses are intact.    Neurological: Patient is alert and oriented to person, place and time.    Pupils:  Pupils are equal, round, and reactive to light.    Skin:  Warm, dry and pale.                Results Review:     I reviewed the patient's new clinical results.  I reviewed the patient's new imaging results and agree with the interpretation.  I reviewed the patient's other test results and agree with the interpretation    WBC WBC   Date Value Ref Range Status   08/26/2024 6.61 3.40 - 10.80 10*3/mm3 Final   08/25/2024 7.66 3.40 - 10.80 10*3/mm3 Final   08/24/2024 5.95 3.40 - 10.80 10*3/mm3 Final      HGB Hemoglobin   Date Value Ref Range Status   08/26/2024 8.7 (L) 12.0 - 15.9 g/dL Final   08/25/2024 8.9 (L) 12.0 - 15.9 g/dL Final   08/24/2024 9.4 (L) 12.0 - 15.9 g/dL Final      HCT Hematocrit   Date Value Ref Range Status   08/26/2024 28.5 (L) 34.0 - 46.6 % Final   08/25/2024 30.1 (L) 34.0 - 46.6 % Final   08/24/2024 32.7 (L) 34.0 - 46.6 % Final      Platlets No results found for: \"LABPLAT\"   MCV MCV   Date Value Ref Range Status   08/26/2024 97.6 (H) 79.0 - 97.0 fL Final   08/25/2024 99.7 (H) 79.0 - 97.0 fL Final   08/24/2024 105.5 (H) 79.0 - 97.0 fL Final          Sodium Sodium   Date Value Ref Range Status   08/26/2024 134 (L) 136 - 145 mmol/L Final   08/25/2024 134 (L) 136 - 145 mmol/L Final   08/24/2024 132 (L) 136 - 145 mmol/L Final      Potassium Potassium   Date Value Ref Range Status   08/26/2024 4.2 3.5 - 5.2 mmol/L Final   08/25/2024 4.8 3.5 - 5.2 mmol/L Final   08/24/2024 5.2 3.5 - 5.2 mmol/L Final     Comment:     Slight hemolysis detected by analyzer. Result may be falsely elevated.      Chloride Chloride   Date Value Ref Range Status   08/26/2024 100 98 - 107 mmol/L Final   08/25/2024 98 98 - 107 mmol/L Final   08/24/2024 97 (L) 98 - 107 mmol/L Final      CO2 CO2   Date " "Value Ref Range Status   08/26/2024 25.9 22.0 - 29.0 mmol/L Final   08/25/2024 25.5 22.0 - 29.0 mmol/L Final   08/24/2024 23.4 22.0 - 29.0 mmol/L Final      BUN BUN   Date Value Ref Range Status   08/26/2024 48 (H) 8 - 23 mg/dL Final   08/25/2024 33 (H) 8 - 23 mg/dL Final   08/24/2024 15 8 - 23 mg/dL Final      Creatinine Creatinine   Date Value Ref Range Status   08/26/2024 3.65 (H) 0.57 - 1.00 mg/dL Final   08/25/2024 3.04 (H) 0.57 - 1.00 mg/dL Final   08/24/2024 2.28 (H) 0.57 - 1.00 mg/dL Final      Calcium Calcium   Date Value Ref Range Status   08/26/2024 8.3 (L) 8.6 - 10.5 mg/dL Final   08/25/2024 8.2 (L) 8.6 - 10.5 mg/dL Final   08/24/2024 8.6 8.6 - 10.5 mg/dL Final      PO4 No results found for: \"CAPO4\"   Albumin Albumin   Date Value Ref Range Status   08/24/2024 2.9 (L) 3.5 - 5.2 g/dL Final      Magnesium Magnesium   Date Value Ref Range Status   08/26/2024 2.5 (H) 1.6 - 2.4 mg/dL Final   08/25/2024 2.7 (H) 1.6 - 2.4 mg/dL Final   08/24/2024 1.9 1.6 - 2.4 mg/dL Final      Uric Acid No results found for: \"URICACID\"     Medication Review:           Assessment & Plan       ESRD (end stage renal disease) on dialysis    Hypoxia    NSVT (nonsustained ventricular tachycardia)      Assessment & Plan  - ESRD with pulmonary edema, severe hypertension and mild hyperkalemia.  Had stat dialysis on admission through left upper extremity AV graft.  TDC removed.  Continue m/w/f.  Okay to DC from renal standpoint, follow-up on Wednesday per usual schedule.    - Acute respiratory acidosis, possibly related to sedatives from earlier procedure.  Better, Hemodynamically stable.    - Severe hypertension in the setting of acute pulmonary edema, she has prior history of renal artery stenosis, status post stenting.  Blood pressure improved.    - Anemia of CKD.  On long-acting CASSANDRA as outpatient.    - Hypothyroidism, per primary.  Discussed with dialysis staff.    Edi García MD  08/26/24  18:25 EDT          "

## 2024-08-26 NOTE — PLAN OF CARE
Goal Outcome Evaluation:  Plan of Care Reviewed With: patient        Progress: improving  Outcome Evaluation: Pt AAOx4. VSS, complaints of pain treated with medication per MAR.

## 2024-08-26 NOTE — PLAN OF CARE
Problem: Adult Inpatient Plan of Care  Goal: Plan of Care Review  8/26/2024 1406 by Lesia Velez RN  Outcome: Adequate for Care Transition  8/26/2024 1406 by Lesia Velez RN  Outcome: Ongoing, Progressing  Goal: Patient-Specific Goal (Individualized)  8/26/2024 1406 by Lesia Velez RN  Outcome: Adequate for Care Transition  8/26/2024 1406 by Lesia Velez RN  Outcome: Ongoing, Progressing  Goal: Absence of Hospital-Acquired Illness or Injury  8/26/2024 1406 by Lesia Velez RN  Outcome: Adequate for Care Transition  8/26/2024 1406 by Lesia Velez RN  Outcome: Ongoing, Progressing  Intervention: Identify and Manage Fall Risk  Recent Flowsheet Documentation  Taken 8/26/2024 0802 by Lesia Velez RN  Safety Promotion/Fall Prevention: safety round/check completed  Taken 8/26/2024 0700 by Lesia Velez RN  Safety Promotion/Fall Prevention: safety round/check completed  Intervention: Prevent Skin Injury  Recent Flowsheet Documentation  Taken 8/26/2024 0802 by Lesia Velez RN  Body Position: position changed independently  Taken 8/26/2024 0700 by Lesia Velez RN  Body Position: position changed independently  Goal: Optimal Comfort and Wellbeing  8/26/2024 1406 by Lesia Velez RN  Outcome: Adequate for Care Transition  8/26/2024 1406 by Lesia Velez RN  Outcome: Ongoing, Progressing  Intervention: Monitor Pain and Promote Comfort  Recent Flowsheet Documentation  Taken 8/26/2024 0700 by Lesia Velez RN  Pain Management Interventions: see MAR  Goal: Readiness for Transition of Care  8/26/2024 1406 by Lesia Velez RN  Outcome: Adequate for Care Transition  8/26/2024 1406 by Lesia Velez RN  Outcome: Ongoing, Progressing     Problem: COPD (Chronic Obstructive Pulmonary Disease) Comorbidity  Goal: Maintenance of COPD Symptom Control  8/26/2024 1406 by Lesia Velez RN  Outcome: Adequate for Care Transition  8/26/2024 1406 by Lesia Velez  RN  Outcome: Ongoing, Progressing     Problem: Heart Failure Comorbidity  Goal: Maintenance of Heart Failure Symptom Control  8/26/2024 1406 by Lesia Velez RN  Outcome: Adequate for Care Transition  8/26/2024 1406 by Lesia Velez RN  Outcome: Ongoing, Progressing     Problem: Hypertension Comorbidity  Goal: Blood Pressure in Desired Range  8/26/2024 1406 by Lesia Velez RN  Outcome: Adequate for Care Transition  8/26/2024 1406 by Lesia Velez RN  Outcome: Ongoing, Progressing     Problem: Skin Injury Risk Increased  Goal: Skin Health and Integrity  8/26/2024 1406 by Lesia Velez RN  Outcome: Adequate for Care Transition  8/26/2024 1406 by Lesia Velez RN  Outcome: Ongoing, Progressing     Problem: Fall Injury Risk  Goal: Absence of Fall and Fall-Related Injury  8/26/2024 1406 by Lesia Velez RN  Outcome: Adequate for Care Transition  8/26/2024 1406 by Lesia Velez RN  Outcome: Ongoing, Progressing  Intervention: Promote Injury-Free Environment  Recent Flowsheet Documentation  Taken 8/26/2024 0802 by Lesia Velez RN  Safety Promotion/Fall Prevention: safety round/check completed  Taken 8/26/2024 0700 by Lesia Velez RN  Safety Promotion/Fall Prevention: safety round/check completed     Problem: Device-Related Complication Risk (Hemodialysis)  Goal: Safe, Effective Therapy Delivery  8/26/2024 1406 by Lesia Velez RN  Outcome: Adequate for Care Transition  8/26/2024 1406 by Lesia Velez RN  Outcome: Ongoing, Progressing     Problem: Hemodynamic Instability (Hemodialysis)  Goal: Effective Tissue Perfusion  8/26/2024 1406 by Lesia Velez RN  Outcome: Adequate for Care Transition  8/26/2024 1406 by Lesia Velez RN  Outcome: Ongoing, Progressing     Problem: Infection (Hemodialysis)  Goal: Absence of Infection Signs and Symptoms  8/26/2024 1406 by Lesia Velez RN  Outcome: Adequate for Care Transition  8/26/2024 1406 by Lesia Vleez  RN  Outcome: Ongoing, Progressing   Goal Outcome Evaluation:

## 2024-08-26 NOTE — OUTREACH NOTE
Prep Survey      Flowsheet Row Responses   [facility] patient discharged from? [facility]   Is LACE score < 7 ? Yes   Eligibility [facility]   Date of Admission 08/21/24   Date of Discharge 08/26/24   Discharge Disposition Home or Self Care   Discharge diagnosis ESRD (end stage renal disease) on dialysis   Does the patient have one of the following disease processes/diagnoses(primary or secondary)? Other   Does the patient have Home health ordered? Yes   What [facility] the [facility]   Prep survey completed? Yes            Sonali WINN - Registered Nurse

## 2024-08-26 NOTE — DISCHARGE SUMMARY
[facility]         HOSPITALIST  DISCHARGE SUMMARY    Patient Name: Charlette Rai  : 1935  MRN: [id number]    Date of Admission: 2024  Date of Discharge:  2024  Primary Care Physician: Brennan Mosqueda MD    Consults:  Nephrology  Pulmonary  Vascular surgery  Cardiology    Active and Resolved Hospital Problems:  Acute on chronic hypoxic and hypercapnic respiratory failure  Pulmonary edema  Nonsustained V. tach  Hypertensive emergency with systolics greater than 200  End-stage renal disease on dialysis  GERD  CAD  Peripheral vascular disease  Heart failure with preserved ejection fraction  Renal artery stenosis  Hypothyroidism  COPD chronically on 3 L nasal cannula    Hospital Course     Hospital Course:  Charlette Rai is an 89 y.o. female with medical history of GERD, end-stage renal disease, coronary artery disease, peripheral vascular disease, hypertension, heart failure with preserved ejection fraction, renal artery stenosis, hypothyroidism, and COPD chronically on 3 L nasal cannula     The patient presented to the hospital on 2024 for an elective tunneled dialysis catheter removal.  Patient with end-stage renal disease, had been using a right IJ tunneled dialysis catheter while her left upper extremity axial axilla AV loop graft was being optimized after pulling clots for short period of time.  Patient presented for removal of tunneled dialysis catheter, plan was to go to dialysis immediately following for a session.  Following patient's procedure patient became more somnolent, noted to be hypoxic on her 3 L nasal cannula with agonal breathing.  CODE BLUE was called, never lost pulses, therefore felt respiratory CODE BLUE.  Patient was intubated for airway protection.  Documentation also indicates patient had been hypertensive during her procedure with systolics greater than 200.  Following intubation patient was transferred to the ICU for ongoing care and  management.  Pulmonary critical care consulted.  Patient's nephrologist also alerted.  Patient underwent dialysis with 4 L removal overnight with improvement in oxygenation.  Easily extubated the following morning.  Patient was kept in the hospital undergoing additional rounds of dialysis.  Patient dialyzed on day of discharge as well.  While inpatient patient had a run of V. tach, 18 beat run.  Therefore cardiology was also able to see patient.  No further workup inpatient, echocardiogram returned with stable aortic stenosis.  Patient will follow-up in cardiology clinic for possibility of event monitor.  Patient seen on date of discharge, clinically and hemodynamically stable.  Patient provided concerning signs and symptoms prompting immediate medical attention, patient understanding and agreeable     DISCHARGE Follow Up Recommendations for labs and diagnostics:   Follow-up with primary care physician as soon as possible  Follow-up with cardiology in clinic  Follow-up with nephrology, continue dialysis schedule    Day of Discharge     Vital Signs:  Temp:  [97.3 °F (36.3 °C)-98.2 °F (36.8 °C)] 97.6 °F (36.4 °C)  Heart Rate:  [69-86] 73  Resp:  [16-18] 16  BP: ()/() 128/82  Flow (L/min):  [4] 4  Physical Exam:               Constitutional: No acute distress, resting comfortably in bed answering questions appropriately              Eyes: Pupils equal, sclerae anicteric, no conjunctival injection              HENT: NCAT, mucous membranes moist              Neck: Supple, no thyromegaly, no lymphadenopathy, trachea midline              Respiratory: Clear to auscultation bilaterally, no wheezing              Cardiovascular: RRR, no murmurs, rubs, or gallops, palpable pedal pulses bilaterally              Gastrointestinal: Positive bowel sounds, soft, nondistended              Musculoskeletal: No bilateral ankle edema, no clubbing or cyanosis to extremities              Neurologic: Cranial nerves grossly  intact, awake alert and oriented.       Discharge Details        Discharge Medications        Changes to Medications        Instructions Start Date   isosorbide mononitrate 30 MG 24 hr tablet  Commonly known as: IMDUR  What changed: when to take this   30 mg, Oral, Daily      levocetirizine 5 MG tablet  Commonly known as: XYZAL  What changed: when to take this   TAKE 1 TABLET DAILY      levothyroxine 50 MCG tablet  Commonly known as: Synthroid   25 mcg, Oral, Daily      losartan 50 MG tablet  Commonly known as: Cozaar  What changed: when to take this   50 mg, Oral, 2 Times Daily      rosuvastatin 20 MG tablet  Commonly known as: CRESTOR  What changed: when to take this   20 mg, Oral, Daily             Continue These Medications        Instructions Start Date   acetaminophen 325 MG tablet  Commonly known as: TYLENOL   650 mg, Oral, Every 6 Hours PRN      albuterol sulfate  (90 Base) MCG/ACT inhaler  Commonly known as: PROVENTIL HFA;VENTOLIN HFA;PROAIR HFA   2 puffs, Inhalation, Every 4 Hours PRN      amLODIPine 2.5 MG tablet  Commonly known as: NORVASC   2.5 mg, Oral, As Needed, For a systolic blood pressure greater than 150      aspirin 81 MG EC tablet   81 mg, Oral, Daily      budesonide 0.5 MG/2ML nebulizer solution  Commonly known as: Pulmicort   0.5 mg, Nebulization, Daily - RT      carvedilol 12.5 MG tablet  Commonly known as: COREG   12.5 mg, Oral, 2 Times Daily With Meals      clopidogrel 75 MG tablet  Commonly known as: Plavix   75 mg, Oral, Daily      dexlansoprazole 60 MG capsule  Commonly known as: DEXILANT   60 mg, Oral, Daily      FIBER THERAPY PO   2 tablets, Oral, Daily      furosemide 20 MG tablet  Commonly known as: LASIX   20 mg, Oral, Daily      ipratropium-albuterol 0.5-2.5 mg/3 ml nebulizer  Commonly known as: DUO-NEB   3 mL, Nebulization, Every 4 Hours PRN      linagliptin 5 MG tablet tablet  Commonly known as: Tradjenta   5 mg, Oral, Daily      nitroglycerin 0.4 MG SL tablet  Commonly  known as: NITROSTAT   Place 1 tablet under the tongue Every 5 (Five) Minutes As Needed for Chest Pain.      ondansetron 4 MG tablet  Commonly known as: ZOFRAN   4 mg, Oral, Every 8 Hours PRN      saccharomyces boulardii 250 MG capsule  Commonly known as: FLORASTOR   250 mg, Oral, 2 Times Daily      sevelamer 800 MG tablet  Commonly known as: RENVELA   800 mg, Oral, 3 Times Daily With Meals      spironolactone 25 MG tablet  Commonly known as: ALDACTONE   25 mg, Oral, Every Other Day      vitamin D 1.25 MG (04371 UT) capsule capsule  Commonly known as: ERGOCALCIFEROL   50,000 Units, Oral, Every 14 Days             ASK your doctor about these medications        Instructions Start Date   tobramycin  Commonly known as: JUAN   300 mg, 2 Times Daily - RT               No Known Allergies    Discharge Disposition:  [place] [facility]    Diet:  Hospital:No active diet order      Discharge Activity:   Activity Instructions       Activity as Tolerated              CODE STATUS:  Code Status and Medical Interventions: CPR (Attempt to Resuscitate); Full Support   Ordered at: 08/21/24 1525     Code Status (Patient has no pulse and is not breathing):    CPR (Attempt to Resuscitate)     Medical Interventions (Patient has pulse or is breathing):    Full Support         Future Appointments   Date Time Provider Department Center   8/28/2024  9:45 AM Brennan Mosqueda [place] [facility]   9/9/2024 11:00 AM Wan Barksdale [place] [facility] [place] [facility]   9/12/2024  1:00 PM Laura Franco APRN [facility] [facility]   9/17/2024  1:15 PM Jazzy Addison APRN [facility] [place] [facility]   9/24/2024  2:00 PM Halima Quick APRKALEY [facility] [place]   9/24/2024  3:30 PM Brennan Medrano MD [facility] [place]   11/19/2024 12:45 PM Brennan Mosqueda MD [facility]   11/21/2024  3:00 PM [facility]ón [facility]       Additional Instructions for the Follow-ups that You Need to Schedule       Discharge Follow-up with PCP   As  directed       Currently Documented PCP:    Brennan Mosqueda MD    PCP Phone Number:    [phone]     Follow Up Details: In less than one week        Discharge Follow-up with Specified Provider: CardiologyDr. Sanchez; 1 Week   As directed      To: CardiologyDr. Sanchez   Follow Up: 1 Week        Discharge Follow-up with Specified Provider: Nephrology, Dr. García; 1 Week   As directed      To: NephrologyDr. García   Follow Up: 1 Week        Discharge Follow-up with Specified Provider: Vascular surgeryDr. Barksdale; 2 Weeks   As directed      To: Vascular surgeryDr. Barksdale   Follow Up: 2 Weeks                Pertinent  and/or Most Recent Results     PROCEDURES:   Right IJ tunneled dialysis catheter removed on 8/21/2024    LAB RESULTS:      Lab 08/26/24  0506 08/25/24  0416 08/24/24  0515 08/23/24  0233 08/22/24  0636 08/22/24  0532 08/21/24  1519 08/21/24  1152   WBC 6.61 7.66 5.95 8.55  --  11.88*  --  8.00   HEMOGLOBIN 8.7* 8.9* 9.4* 9.2*  --  11.3*  --  10.1*   HEMATOCRIT 28.5* 30.1* 32.7* 32.1*  --  39.5  --  34.0   PLATELETS 159 154 112* 127*  --  124*  --  154   NEUTROS ABS  --   --   --   --   --  8.80*  --  6.57   IMMATURE GRANS (ABS)  --   --   --   --   --  0.06*  --  0.03   LYMPHS ABS  --   --   --   --   --  0.87  --  0.60*   MONOS ABS  --   --   --   --   --  1.89*  --  0.71   EOS ABS  --   --   --   --   --  0.18  --  0.06   MCV 97.6* 99.7* 105.5* 103.9*  --  104.8*  --  101.8*   LACTATE  --   --   --   --   --   --  <0.4  --    PROTIME  --   --   --   --  15.1*  --   --  14.6   APTT  --   --   --   --  31.9  --   --  28.7         Lab 08/26/24  0506 08/25/24  0416 08/24/24  0515 08/23/24  1209 08/23/24  0233 08/22/24  0532   SODIUM 134* 134* 132* 133* 136 136   POTASSIUM 4.2 4.8 5.2 4.6 4.4 4.9   CHLORIDE 100 98 97* 97* 98 97*   CO2 25.9 25.5 23.4 26.9 29.6* 29.0   ANION GAP 8.1 10.5 11.6 9.1 8.4 10.0   BUN 48* 33* 15 26* 22 13   CREATININE 3.65* 3.04* 2.28* 3.25* 3.12* 2.29*   EGFR 11.6* 14.4*  20.3* 13.3* 13.9* 20.2*   GLUCOSE 105* 143* 142* 139* 89 62*   CALCIUM 8.3* 8.2* 8.6 8.1* 8.5* 8.6   MAGNESIUM 2.5* 2.7* 1.9 2.5* 1.8 1.8   PHOSPHORUS  --  3.0 3.2  --  3.6 3.5         Lab 08/24/24  0515 08/23/24  1209 08/23/24  0233   TOTAL PROTEIN 5.9* 5.7* 5.4*   ALBUMIN 2.9* 2.7* 2.7*   GLOBULIN 3.0 3.0 2.7   ALT (SGPT) 9 6 7   AST (SGOT) 24 20 17   BILIRUBIN 0.4 0.3 0.3   ALK PHOS 191* 184* 172*         Lab 08/22/24  0636 08/21/24  1701 08/21/24  1516 08/21/24  1152   PROBNP  --   --  30,396.0*  --    HSTROP T  --  58* 55*  --    PROTIME 15.1*  --   --  14.6   INR 1.16*  --   --  1.12                 Lab 08/21/24  1519   PH, ARTERIAL 7.311*   PCO2, ARTERIAL 75.4*   PO2 .6*   O2 SATURATION .0*   FIO2 100   HCO3 ART 38.0*   BASE EXCESS ART 9.5*     Brief Urine Lab Results  (Last result in the past 365 days)        Color   Clarity   Blood   Leuk Est   Nitrite   Protein   CREAT   Urine HCG        05/01/24 2102 Dark Yellow   Cloudy   Negative   Small (1+)   Negative   >=300 mg/dL (3+)                 Microbiology Results (last 10 days)       ** No results found for the last 240 hours. **            XR Chest 1 View    Result Date: 8/21/2024  Impression: Impression: 1.Tip of the endotracheal tube terminates in the upper thoracic trachea. The rebecca is not well visualized. 2.Diffusely increased bilateral airspace opacities, concerning for pulmonary edema or possibly multifocal pneumonia. 3.Stable bilateral scarring with left pleural plaques. Electronically Signed: Tanvi Alvarez  8/21/2024 3:46 PM EDT  Workstation ID: [id number]    XR Chest 1 View    Result Date: 7/29/2024  Impression: Impression: 1. Chronic appearing scarring and pleural thickening in the left hemithorax, stable. 2. Ill-defined right perihilar density in the right lung which appears unchanged. No acute infiltrates are identified. 3. Right-sided dialysis catheter appears in appropriate position Electronically Signed: León Frederick MD   7/29/2024 11:03 AM EDT  Workstation ID: [id number]     Results for orders placed during the hospital encounter of 07/23/24    Duplex Renal Artery - Bilateral Complete CAR    Interpretation Summary    Normal right renal artery.    Normal left renal artery.  Left renal artery stent not characterized.      Results for orders placed during the hospital encounter of 07/23/24    Duplex Renal Artery - Bilateral Complete CAR    Interpretation Summary    Normal right renal artery.    Normal left renal artery.  Left renal artery stent not characterized.      Results for orders placed during the hospital encounter of 08/21/24    Adult Transthoracic Echo Limited W/ Cont if Necessary Per Protocol    Interpretation Summary    Left ventricular systolic function is low normal. Left ventricular ejection fraction appears to be 51 - 55%.    Left ventricular wall thickness is consistent with mild concentric hypertrophy.    The left atrial cavity is mild to moderately dilated.    Aortic valve is moderately calcified.  Mild to moderate aortic valve stenosis is present.  The peak and mean gradient across aortic valve are 21/15 mmHg with a calculated valve area of 1.64 cm².    Mitral valve apparatus is calcified with restricted opening.  Possible mitral stenosis, unable to assess severity.  Adequate Doppler studies not obtained across the mitral valve.      Labs Pending at Discharge:        Time spent on Discharge including face to face service:  36 minutes    Electronically signed by Miguel Dennison MD, 08/26/24, 6:24 PM EDT.

## 2024-08-26 NOTE — DISCHARGE INSTR - APPOINTMENTS
DOCTOR EMMANUEL WILL NOT SEE HER BECAUSE SHE IS A DIAYSIS PT THE CLINIC WILL  HER. DOCTOR KENIA  OFFICE WILL CALL HER  FOR APPT: AND DOCTOR SAYRA WILL BE 9/9/24 AT 11:00 AM.

## 2024-08-26 NOTE — SIGNIFICANT NOTE
08/26/24 1318   OTHER   Discipline occupational therapist   Rehab Time/Intention   Session Not Performed patient unavailable for treatment

## 2024-08-27 ENCOUNTER — TRANSITIONAL CARE MANAGEMENT TELEPHONE ENCOUNTER (OUTPATIENT)
Dept: CALL CENTER | Facility: HOSPITAL | Age: 89
End: 2024-08-27
Payer: MEDICARE

## 2024-08-27 ENCOUNTER — TELEPHONE (OUTPATIENT)
Dept: INTERNAL MEDICINE | Age: 89
End: 2024-08-27

## 2024-08-27 NOTE — OUTREACH NOTE
Call Center TCM Note      Flowsheet Row Responses   [facility] [place], [facility] patient discharged from? [facility]   Does the patient have one of the following disease processes/diagnoses(primary or secondary)? Other   TCM attempt successful? No  [VR for Chicho,  as well as Watson, dtrs]   Unsuccessful attempts Attempt 1  [attempted pt and dtr]   Call Status Left message            Cris Reyna RN    8/27/2024, 13:55 EDT

## 2024-08-27 NOTE — OUTREACH NOTE
Call Center TCM Note      Flowsheet Row Responses   [facility] [place] patient discharged from? [facility]   Does the patient have one of the following disease processes/diagnoses(primary or secondary)? Other   TCM attempt successful? No   Unsuccessful attempts Attempt 2            Cris Reyna RN    8/27/2024, 16:30 EDT

## 2024-08-28 ENCOUNTER — TRANSITIONAL CARE MANAGEMENT TELEPHONE ENCOUNTER (OUTPATIENT)
Dept: CALL CENTER | Facility: HOSPITAL | Age: 89
End: 2024-08-28
Payer: MEDICARE

## 2024-08-28 ENCOUNTER — OFFICE VISIT (OUTPATIENT)
Dept: INTERNAL MEDICINE | Age: 89
End: 2024-08-28
Payer: MEDICARE

## 2024-08-28 VITALS
HEART RATE: 61 BPM | HEIGHT: 65 IN | SYSTOLIC BLOOD PRESSURE: 149 MMHG | OXYGEN SATURATION: 98 % | BODY MASS INDEX: 21.99 KG/M2 | TEMPERATURE: 98.2 F | DIASTOLIC BLOOD PRESSURE: 50 MMHG | WEIGHT: 132 LBS

## 2024-08-28 DIAGNOSIS — R09.02 HYPOXIA: ICD-10-CM

## 2024-08-28 DIAGNOSIS — J96.22 ACUTE ON CHRONIC RESPIRATORY FAILURE WITH HYPOXIA AND HYPERCAPNIA: Primary | ICD-10-CM

## 2024-08-28 DIAGNOSIS — N18.6 ESRD (END STAGE RENAL DISEASE) ON DIALYSIS: ICD-10-CM

## 2024-08-28 DIAGNOSIS — E78.5 HYPERLIPIDEMIA LDL GOAL <70: ICD-10-CM

## 2024-08-28 DIAGNOSIS — J96.21 ACUTE ON CHRONIC RESPIRATORY FAILURE WITH HYPOXIA AND HYPERCAPNIA: Primary | ICD-10-CM

## 2024-08-28 DIAGNOSIS — Z99.2 ESRD (END STAGE RENAL DISEASE) ON DIALYSIS: ICD-10-CM

## 2024-08-28 DIAGNOSIS — I10 ESSENTIAL HYPERTENSION: ICD-10-CM

## 2024-08-28 DIAGNOSIS — I50.32 CHRONIC HEART FAILURE WITH PRESERVED EJECTION FRACTION: ICD-10-CM

## 2024-08-28 DIAGNOSIS — R54 FRAILTY SYNDROME IN GERIATRIC PATIENT: ICD-10-CM

## 2024-08-28 PROCEDURE — 99495 TRANSJ CARE MGMT MOD F2F 14D: CPT | Performed by: INTERNAL MEDICINE

## 2024-08-28 PROCEDURE — 1125F AMNT PAIN NOTED PAIN PRSNT: CPT | Performed by: INTERNAL MEDICINE

## 2024-08-28 PROCEDURE — 1111F DSCHRG MED/CURRENT MED MERGE: CPT | Performed by: INTERNAL MEDICINE

## 2024-08-28 RX ORDER — BUDESONIDE 0.5 MG/2ML
0.5 INHALANT ORAL
Qty: 60 EACH | Refills: 5 | Status: SHIPPED | OUTPATIENT
Start: 2024-08-28

## 2024-08-28 RX ORDER — IPRATROPIUM BROMIDE AND ALBUTEROL SULFATE 2.5; .5 MG/3ML; MG/3ML
3 SOLUTION RESPIRATORY (INHALATION)
Qty: 360 ML | Refills: 2 | Status: SHIPPED | OUTPATIENT
Start: 2024-08-28 | End: 2024-09-27

## 2024-08-28 NOTE — PROGRESS NOTES
"CHIEF COMPLAINT/ HPI:  Hospital Follow Up Visit ([facility]- 8/21/2024- Renal Disease. Pt went in to have her Port removal and pt states she coded. Med Reconciliation. Pt states feeling very weak. )  Hospital follow-up for recent acute hypoxic hypercapnic respiratory failure requiring intubation due to altered mental status and airway protection after her removal of her tunneled dialysis catheter, she was in the hospital 4 to 5 days and received several dialysis sessions with removal of fluid and improved oxygenation, she did have some nonsustained V. tach and they recommended consideration for follow-up as an outpatient            Objective   Vital Signs  Vitals:    08/28/24 0923   BP: 149/50   Pulse: 61   Temp: 98.2 °F (36.8 °C)   SpO2: 98%   Weight: 59.9 kg (132 lb)   Height: 165.1 cm (65\")      Body mass index is 21.97 kg/m².  Review of Systems frailty, weakness, shortness of breath,  Physical Exam  Constitutional:       General: She is not in acute distress.     Appearance: Normal appearance.   HENT:      Head: Normocephalic.      Mouth/Throat:      Mouth: Mucous membranes are moist.   Eyes:      Conjunctiva/sclera: Conjunctivae normal.      Pupils: Pupils are equal, round, and reactive to light.   Cardiovascular:      Rate and Rhythm: Normal rate and regular rhythm.      Pulses: Normal pulses.      Heart sounds: Murmur heard.   Pulmonary:      Breath sounds: Rhonchi and rales present.   Abdominal:      General: Bowel sounds are normal.      Palpations: Abdomen is soft.   Musculoskeletal:         General: No swelling. Normal range of motion.      Cervical back: Neck supple.      Right lower leg: Edema present.      Left lower leg: Edema present.   Skin:     General: Skin is warm and dry.      Coloration: Skin is not jaundiced.   Neurological:      General: No focal deficit present.      Mental Status: She is alert and oriented to person, place, and time. Mental status is at baseline.   Psychiatric:         Mood and " Affect: Mood normal.         Behavior: Behavior normal.         Thought Content: Thought content normal.         Judgment: Judgment normal.     Right upper chest wall bandages intact dry, patient's arm shows some ecchymosis bilateral, from previous IV sites she has trace edema in the lower legs bilateral, cardiac regular rhythm with holosystolic soft murmur, and her lungs show some inspiratory rhonchi crackles bilateral bases clearing at the apices,  Result Review :   Lab Results   Component Value Date    PROBNP 30,396.0 (H) 08/21/2024    PROBNP 25,718.0 (H) 07/15/2024    PROBNP 29,939.0 (H) 06/27/2024     09/09/2019     04/11/2019     CMP          8/24/2024    05:15 8/25/2024    04:16 8/26/2024    05:06   CMP   Glucose 142  143  105    BUN 15  33  48    Creatinine 2.28  3.04  3.65    EGFR 20.3  14.4  11.6    Sodium 132  134  134    Potassium 5.2  4.8  4.2    Chloride 97  98  100    Calcium 8.6  8.2  8.3    Total Protein 5.9      Albumin 2.9      Globulin 3.0      Total Bilirubin 0.4      Alkaline Phosphatase 191      AST (SGOT) 24      ALT (SGPT) 9      Albumin/Globulin Ratio 1.0      BUN/Creatinine Ratio 6.6  10.9  13.2    Anion Gap 11.6  10.5  8.1      CBC w/diff          8/24/2024    05:15 8/25/2024    04:16 8/26/2024    05:06   CBC w/Diff   WBC 5.95  7.66  6.61    RBC 3.10  3.02  2.92    Hemoglobin 9.4  8.9  8.7    Hematocrit 32.7  30.1  28.5    .5  99.7  97.6    MCH 30.3  29.5  29.8    MCHC 28.7  29.6  30.5    RDW 14.1  14.0  14.3    Platelets 112  154  159       Lipid Panel          6/27/2024    09:46 8/6/2024    09:26   Lipid Panel   Total Cholesterol 126  94    Triglycerides 55  69    HDL Cholesterol 88  54    VLDL Cholesterol 12  15    LDL Cholesterol  26  25    LDL/HDL Ratio 0.31  0.49       Lab Results   Component Value Date    TSH 0.724 08/06/2024    TSH 7.250 (H) 06/27/2024    TSH 3.630 05/01/2024      Lab Results   Component Value Date    FREET4 1.40 08/06/2024    FREET4 1.28  06/27/2024    FREET4 1.34 05/01/2024      A1C Last 3 Results          4/19/2024    00:00 5/29/2024    00:00 7/17/2024    00:00   HGBA1C Last 3 Results   Hemoglobin A1C 5.7     5.4     5.4          Details          This result is from an external source.                               Visit Diagnoses:    ICD-10-CM ICD-9-CM   1. Acute on chronic respiratory failure with hypoxia and hypercapnia  J96.21 518.84    J96.22 786.09     799.02   2. Frailty syndrome in geriatric patient  R54 797   3. Chronic heart failure with preserved ejection fraction  I50.32 428.9   4. Essential hypertension  I10 401.9   5. Hyperlipidemia LDL goal <70  E78.5 272.4   6. Hypoxia  R09.02 799.02   7. ESRD (end stage renal disease) on dialysis  N18.6 585.6    Z99.2 V45.11       Assessment and Plan   Diagnoses and all orders for this visit:    1. Acute on chronic respiratory failure with hypoxia and hypercapnia (Primary)    2. Frailty syndrome in geriatric patient    3. Chronic heart failure with preserved ejection fraction    4. Essential hypertension    5. Hyperlipidemia LDL goal <70    6. Hypoxia    7. ESRD (end stage renal disease) on dialysis    Other orders  -     budesonide (Pulmicort) 0.5 MG/2ML nebulizer solution; Take 2 mL by nebulization 2 (Two) Times a Day.  Dispense: 60 each; Refill: 5  -     ipratropium-albuterol (DUO-NEB) 0.5-2.5 mg/3 ml nebulizer; Take 3 mL by nebulization 4 (Four) Times a Day for 30 days.  Dispense: 360 mL; Refill: 2        Transitional care visit hospital admission August 21 to August 26, 2024 with multiple consultants and diagnosis of acute on chronic hypoxic and hypercapnic respiratory failure pulmonary edema nonsustained V. tach with hypertension urgency emergency, continued on dialysis, hospital course, she came after having a tunneled dialysis catheter removal, and she was going to go to dialysis following her removal for a session on her new AV fistula, she became confused somnolent hypoxic after the  procedure a CODE BLUE was called but she never lost a pulse, patient was intubated for airway protection she underwent dialysis with removal of 4 L overnight with improvement in oxygenation she was easily extubated the next day and she monitored for another 48 hours on dialysis, because of some nonsustained V. tach cardiology saw the patient but did not pursue any further treatment options, and they thought she might need an event monitor as an outpatient    Annual wellness visit completed July 16, 2024    Back pain, patient was asking for some pain medications discussed our role in neck care, can make referral to pain management, for possible narcotic usage on a long-term basis,    Generalized weakness, continues to stabilize now August 28, 2024 uses her rollator walker to get around, goes to dialysis 3 days a week,    Frailty discussed with her frailty weakness that I would stop Trulicity completely which we have done and will monitor her sugars with Accu-Cheks continue Tradjenta as below,    End-stage renal disease ----on dialysis, tunneled dialysis catheter removed August 21 22nd 2024, now with AV fistula left upper extremity being used      Hypoxia continues oxygen,    Previous pneumonia, question about continuing her tobramycin nebulizer solutions?  July 16, 2024 currently off this medication    Diabetes, continues Tradjenta 5 mg daily,,     Coronary artery disease, continues carvedilol 12.5 mg twice a day, aspirin 81 mg daily, Cozaar 50 mg daily, Imdur 30 mg daily----does follow-up with [facility]ology [facility]    GERD, continues Dexilant 60 mg daily     COPD, continues Brovana nebs twice a day, DuoNebs 4 times a day, ProAir inhaler as needed, Pulmicort nebs twice a day    Hypertension    Anemia ==chronic disease state will follow labs    PAD    Hyperlipidemia, continues rosuvastatin 20 mg daily    Lung cancer history--2005, chemo x 3 for right side, and partial lobectomy on the left side for recurrence  of cancer,         Follow Up   Return for Next scheduled follow up.  Patient was given instructions and counseling regarding her condition or for health maintenance advice. Please see specific information pulled into the AVS if appropriate.

## 2024-08-28 NOTE — OUTREACH NOTE
Call Center TCM Note      Flowsheet Row Responses   [facility] patient discharged from? [facility]   Does the patient have one of the following disease processes/diagnoses(primary or secondary)? Other   TCM attempt successful? Yes   Call start time 1446   Call end time 1446  [Patient saw her pcp this morning, which meets tcm criteria.]   Discharge diagnosis ESRD (end stage renal disease) on dialysis   Does the patient have an appointment with their PCP within 7-14 days of discharge? Yes   TCM call completed? Yes   Call end time 1446  [Patient saw her pcp this morning, which meets tcm criteria.]            Liz Melgoza LPN    8/28/2024, 14:50 EDT

## 2024-08-31 ENCOUNTER — OUTSIDE FACILITY SERVICE (OUTPATIENT)
Dept: INTERNAL MEDICINE | Age: 89
End: 2024-08-31
Payer: MEDICARE

## 2024-09-06 LAB
QT INTERVAL: 412 MS
QTC INTERVAL: 484 MS

## 2024-09-09 ENCOUNTER — OFFICE VISIT (OUTPATIENT)
Dept: VASCULAR SURGERY | Facility: HOSPITAL | Age: 89
End: 2024-09-09
Payer: MEDICARE

## 2024-09-09 VITALS
SYSTOLIC BLOOD PRESSURE: 170 MMHG | OXYGEN SATURATION: 95 % | HEART RATE: 77 BPM | RESPIRATION RATE: 18 BRPM | TEMPERATURE: 97.4 F | DIASTOLIC BLOOD PRESSURE: 60 MMHG

## 2024-09-09 DIAGNOSIS — N18.6 ESRD ON DIALYSIS: Primary | ICD-10-CM

## 2024-09-09 DIAGNOSIS — Z99.2 ESRD ON DIALYSIS: Primary | ICD-10-CM

## 2024-09-09 PROCEDURE — G0463 HOSPITAL OUTPT CLINIC VISIT: HCPCS | Performed by: SURGERY

## 2024-09-09 PROCEDURE — 99024 POSTOP FOLLOW-UP VISIT: CPT | Performed by: SURGERY

## 2024-09-09 PROCEDURE — 1160F RVW MEDS BY RX/DR IN RCRD: CPT | Performed by: SURGERY

## 2024-09-09 PROCEDURE — 1159F MED LIST DOCD IN RCRD: CPT | Performed by: SURGERY

## 2024-09-09 NOTE — PROGRESS NOTES
[facility]   Follow up Office    Patient Name: Charlette Rai  : 1935  MRN: [id number]  Primary Care Physician:  Brennan Mosqueda MD      Subjective   Subjective     HPI:    Charlette Rai is a 89 y.o. female here for follow-up after removal of a right chest tunneled dialysis catheter 2024 complicated by respiratory failure, CODE BLUE, intubation and admission to ICU.  Doing well now.      Objective     Vitals:   Temp:  [97.4 °F (36.3 °C)] 97.4 °F (36.3 °C)  Heart Rate:  [77] 77  Resp:  [18] 18  BP: (170)/(60) 170/60    Physical Exam      General: Alert, no acute distress  TDC removal site: Healed.    Assessment & Plan   Assessment / Plan     Diagnoses and all orders for this visit:    1. ESRD on dialysis (Primary)       Assessment/Plan:   Satisfactory progress following complicated TDC removal.  Follow-up with us as needed.        Electronically signed by Wan Barksdale MD, 24, 11:17 AM EDT.

## 2024-09-12 ENCOUNTER — OFFICE VISIT (OUTPATIENT)
Dept: PULMONOLOGY | Facility: CLINIC | Age: 89
End: 2024-09-12
Payer: MEDICARE

## 2024-09-12 VITALS
OXYGEN SATURATION: 99 % | HEIGHT: 65 IN | DIASTOLIC BLOOD PRESSURE: 33 MMHG | TEMPERATURE: 97.6 F | WEIGHT: 139.6 LBS | HEART RATE: 94 BPM | BODY MASS INDEX: 23.26 KG/M2 | RESPIRATION RATE: 14 BRPM | SYSTOLIC BLOOD PRESSURE: 150 MMHG

## 2024-09-12 DIAGNOSIS — Z99.2 ESRD (END STAGE RENAL DISEASE) ON DIALYSIS: Chronic | ICD-10-CM

## 2024-09-12 DIAGNOSIS — F17.211 NICOTINE DEPENDENCE, CIGARETTES, IN REMISSION: ICD-10-CM

## 2024-09-12 DIAGNOSIS — J44.9 CHRONIC OBSTRUCTIVE PULMONARY DISEASE, UNSPECIFIED COPD TYPE: Primary | Chronic | ICD-10-CM

## 2024-09-12 DIAGNOSIS — J98.8 PSEUDOMONAS RESPIRATORY INFECTION: ICD-10-CM

## 2024-09-12 DIAGNOSIS — B96.5 PSEUDOMONAS RESPIRATORY INFECTION: ICD-10-CM

## 2024-09-12 DIAGNOSIS — N18.6 ESRD (END STAGE RENAL DISEASE) ON DIALYSIS: Chronic | ICD-10-CM

## 2024-09-12 DIAGNOSIS — J96.21 ACUTE ON CHRONIC RESPIRATORY FAILURE WITH HYPOXIA: ICD-10-CM

## 2024-09-12 DIAGNOSIS — I50.32 CHRONIC HEART FAILURE WITH PRESERVED EJECTION FRACTION: Chronic | ICD-10-CM

## 2024-09-12 PROCEDURE — 1159F MED LIST DOCD IN RCRD: CPT | Performed by: NURSE PRACTITIONER

## 2024-09-12 PROCEDURE — 99215 OFFICE O/P EST HI 40 MIN: CPT | Performed by: NURSE PRACTITIONER

## 2024-09-12 PROCEDURE — 1160F RVW MEDS BY RX/DR IN RCRD: CPT | Performed by: NURSE PRACTITIONER

## 2024-09-12 RX ORDER — LEVOTHYROXINE SODIUM 25 UG/1
1 TABLET ORAL DAILY
COMMUNITY
Start: 2024-07-08

## 2024-09-12 RX ORDER — ISOSORBIDE DINITRATE 30 MG/1
1 TABLET ORAL DAILY
COMMUNITY
Start: 2024-08-28

## 2024-09-12 RX ORDER — FLUTICASONE PROPIONATE 50 MCG
SPRAY, SUSPENSION (ML) NASAL
COMMUNITY
Start: 2024-09-12

## 2024-09-12 RX ORDER — TOBRAMYCIN INHALATION SOLUTION 300 MG/5ML
300 INHALANT RESPIRATORY (INHALATION)
Qty: 300 ML | Refills: 6 | Status: SHIPPED | OUTPATIENT
Start: 2024-09-12

## 2024-09-12 RX ORDER — ALBUTEROL SULFATE 90 UG/1
2 AEROSOL, METERED RESPIRATORY (INHALATION) EVERY 4 HOURS PRN
Qty: 18 G | Refills: 11 | Status: SHIPPED | OUTPATIENT
Start: 2024-09-12

## 2024-09-12 NOTE — PROGRESS NOTES
Primary Care Provider  Brennan Mosqueda MD     Referring Provider  Brennan Mosqueda, *     Chief Complaint  Shortness of Breath (More than normal, with exertion ), Hospital Follow Up Visit ([facility] 8/21-8/26 ), Cough (Dry cough ), and COPD    Subjective          Charlette Rai presents to [facility]  History of Present Illness  Charlette Rai is a 89 y.o. female patient recently admitted to [facility] from 8/21/2024 until 8/26/2024.  She is here for a posthospital follow-up visit.    Per patient's discharge summary, she does have a GERD, end-stage renal disease, coronary artery disease, peripheral vascular disease, hypertension, heart failure with preserved ejection fraction, renal artery stenosis, hypothyroidism and COPD on 3 L of oxygen.  She had presented for an elective tunneled dialysis catheter removal on 8/21/2024.  Patient had presented for removal of the tunneled dialysis catheter and the plan was to immediately go to dialysis for a session.  Patient did have a left upper extremity axilla AV loop graft.  Following patient's procedure she became more somnolent and was noted to be hypoxic on her 3 L with agonal breathing.  A CODE BLUE was called but patient never lost pulse therefore this was felt to be a respiratory CODE BLUE.  Patient was intubated for airway protection.  Documentation shows that patient was hypertensive during the procedure with systolic pressures greater than 200.  Patient was transferred to the intensive care unit for further management.  Patient underwent dialysis with 4 L of removal overnight with improved oxygenation.  Patient was easily extubated the following morning.  Patient was In the hospital undergoing additional rounds of dialysis.  Patient was dialyzed on day of discharge as well.  Patient did have a 18 beat run of V. tach and therefore cardiology was consulted.  There was no further inpatient workup.   Echocardiogram showed stable aortic stenosis.    Patient states that she is doing well since discharge from the hospital.  She does continue to receive dialysis on Monday, Wednesdays and Fridays.  She is being followed by Dr. García.  She sees cardiology for heart failure with preserved ejection fraction, hypertension and hyperlipidemia.  Patient does see Dr. Sanchez.  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  Patient's daughter states that she has not had this medication since being discharged from rehab in May 2024.  Patient does take Pulmicort twice a day and as needed albuterol at night.  Patient was previously managed by Dr. Lainez for COPD.  Patient states that she has had nighttime oxygen for some time but has been using her oxygen more frequently these past few months.  Patient states that she is unsure of any family history of COPD.  Patient did have lung cancer in 2005 and received 3 rounds of chemotherapy along with a right upper lobectomy.  It is a former smoker and quit in 2004.  She does have a 52.5-pack-year history.  She denies any significant occupational exposures to chemicals.  Overall, she is that she is doing okay and has no additional concerns at this time.       Her history of smoking is   Tobacco Use: Medium Risk (9/12/2024)    Patient History     Smoking Tobacco Use: Former     Smokeless Tobacco Use: Never     Passive Exposure: Past   .    Review of Systems   Constitutional:  Negative for chills, fatigue, fever, unexpected weight gain and unexpected weight loss.   HENT:  Congestion: Nasal.    Respiratory:  Positive for cough and shortness of breath. Negative for apnea and wheezing.         Negative for Hemoptysis     Cardiovascular:  Negative for chest pain, palpitations and leg swelling.   Skin:         Negative for cyanosis      Sleep: Negative for Excessive daytime sleepiness  Negative for morning headaches  Negative for Snoring    Family  History   Problem Relation Age of Onset    Arthritis Mother     Arthritis Father     Cancer Brother     Diabetes Brother     Other Brother         blood disease     Prostate cancer Brother     Cancer Brother     Prostate cancer Brother     Cancer Brother     Cancer Brother     Malig Hyperthermia Neg Hx     Colon cancer Neg Hx         Social History     Socioeconomic History    Marital status:    Tobacco Use    Smoking status: Former     Current packs/day: 0.00     Average packs/day: 1 pack/day for 52.5 years (52.5 ttl pk-yrs)     Types: Cigarettes     Start date:      Quit date: 2004     Years since quittin.2     Passive exposure: Past    Smokeless tobacco: Never   Vaping Use    Vaping status: Never Used   Substance and Sexual Activity    Alcohol use: Never    Drug use: Never    Sexual activity: Defer        Past Medical History:   Diagnosis Date    Allergic rhinitis     Anaphylactic shock, unspecified, initial encounter 2023    Anemia     Arthritis     Asthma     USE INHALERS AND NEBULIZERS    Back pain     Bladder disorder     Cancer     LEFT LUNG CANCER  SURGERY AND CHEMO DONE  AND CURRENTLY   RIGHT LUNG CANCER HAS ONLY RECEIVED RADIATION THUS FAR    Chronic kidney disease     stage 3    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 DENIES CP BUT DOES HAVE SOA CHRONIC ISSUE COPD/LUNG CANCER    COPD (chronic obstructive pulmonary disease)     FOLLOWED BY DR MARIAJOSE BAILEY    Coronary artery disease     DENIES CP BUT DOES GET SOA MOST OF THE TIME WITH EXERTION BUT OCC AT REST CHRONIC ISSUE COPD/LUNG CANCER    Deep vein thrombosis     Diabetes mellitus     DOES NOT CHECK BS DAILY    Disease of thyroid gland     HYPOTHYROIDISM    Essential hypertension     Gastric ulcer     GERD (gastroesophageal reflux disease)     Heart  murmur     History of transfusion     NO ISSUES POST TRANSFUSION WAS MANY YEARS AGO    Hyperlipemia     Leukocytopenia     FOLLOWED BY DR MIR BAILEY    Limb swelling     Lumbago     Lumbar spinal stenosis     Lung cancer     Migraine headache     Multiple joint pain     On home oxygen therapy     3L/NC PRN    Osteopenia     PNA (pneumonia) 05/04/2024    Pseudomonas pneumonia 04/29/2024    Reflux esophagitis     Shortness of breath     Thyroid nodule     HAS ULTRASOUND YEARLY BEING MONITORED    Vascular disease     Vitamin D deficiency         Immunization History   Administered Date(s) Administered    COVID-19 (PFIZER) BIVALENT 12+YRS 10/13/2022    COVID-19 (PFIZER) Purple Cap Monovalent 03/07/2021, 04/04/2021, 10/31/2021    COVID-19 F23 (PFIZER) 12YRS+ (COMIRNATY) 10/19/2023    Fluad Quad 65+ 10/19/2023    Fluzone (or Fluarix & Flulaval for VFC) >6mos 09/21/2016    Fluzone High-Dose 65+YRS 10/09/2018, 10/15/2020, 10/12/2021, 09/19/2022    Hepatitis B 2 Dose Vaccine Heplisav-B 08/28/2024    Influenza Seasonal Injectable 10/01/2017, 10/01/2018    Influenza, Unspecified 10/13/2021    Pneumococcal Conjugate 13-Valent (PCV13) 10/21/2015    Pneumococcal Conjugate 20-Valent (PCV20) 08/30/2024    Pneumococcal Polysaccharide (PPSV23) 10/30/2003, 11/08/2016         No Known Allergies       Current Outpatient Medications:     acetaminophen (TYLENOL) 325 MG tablet, Take 2 tablets by mouth Every 6 (Six) Hours As Needed for Mild Pain., Disp: , Rfl:     albuterol sulfate  (90 Base) MCG/ACT inhaler, Inhale 2 puffs Every 4 (Four) Hours As Needed for Wheezing., Disp: 18 g, Rfl: 11    amLODIPine (NORVASC) 2.5 MG tablet, Take 1 tablet by mouth As Needed (Once daily for a systolic blood pressure greater than 150). For a systolic blood pressure greater than 150, Disp: 90 tablet, Rfl: 3    aspirin 81 MG EC tablet, Take 1 tablet by mouth Daily., Disp: , Rfl:     budesonide (Pulmicort) 0.5 MG/2ML nebulizer solution, Take 2 mL by  nebulization 2 (Two) Times a Day., Disp: 60 each, Rfl: 5    clopidogrel (Plavix) 75 MG tablet, Take 1 tablet by mouth Daily., Disp: 90 tablet, Rfl: 3    dexlansoprazole (DEXILANT) 60 MG capsule, Take 1 capsule by mouth Daily., Disp: 90 capsule, Rfl: 3    fluticasone (FLONASE) 50 MCG/ACT nasal spray, , Disp: , Rfl:     furosemide (LASIX) 20 MG tablet, Take 1 tablet by mouth Daily., Disp: 90 tablet, Rfl: 3    ipratropium-albuterol (DUO-NEB) 0.5-2.5 mg/3 ml nebulizer, Take 3 mL by nebulization 4 (Four) Times a Day for 30 days., Disp: 360 mL, Rfl: 2    isosorbide dinitrate (ISORDIL) 30 MG tablet, Take 1 tablet by mouth Daily., Disp: , Rfl:     isosorbide mononitrate (IMDUR) 30 MG 24 hr tablet, Take 1 tablet by mouth Daily. (Patient taking differently: Take 1 tablet by mouth Every Night.), Disp: 90 tablet, Rfl: 3    levocetirizine (XYZAL) 5 MG tablet, TAKE 1 TABLET DAILY (Patient taking differently: Take 1 tablet by mouth Every Evening.), Disp: 90 tablet, Rfl: 1    levothyroxine (Synthroid) 50 MCG tablet, Take 0.5 tablets by mouth Daily., Disp: 45 tablet, Rfl: 3    levothyroxine (SYNTHROID, LEVOTHROID) 25 MCG tablet, Take 1 tablet by mouth Daily., Disp: , Rfl:     linagliptin (Tradjenta) 5 MG tablet tablet, Take 1 tablet by mouth Daily., Disp: 90 tablet, Rfl: 1    losartan (Cozaar) 50 MG tablet, Take 1 tablet by mouth 2 (Two) Times a Day., Disp: 180 tablet, Rfl: 3    Methoxy PEG-Epoetin Beta (MIRCERA IJ), 60 mcg Every 14 (Fourteen) Days., Disp: , Rfl:     nitroglycerin (NITROSTAT) 0.4 MG SL tablet, Place 1 tablet under the tongue Every 5 (Five) Minutes As Needed for Chest Pain., Disp: , Rfl:     ondansetron (ZOFRAN) 4 MG tablet, Take 1 tablet by mouth Every 8 (Eight) Hours As Needed for Nausea or Vomiting., Disp: , Rfl:     rosuvastatin (CRESTOR) 20 MG tablet, Take 1 tablet by mouth Daily. (Patient taking differently: Take 1 tablet by mouth Every Night.), Disp: 90 tablet, Rfl: 3    sevelamer (RENVELA) 800 MG tablet, Take  1 tablet by mouth 3 (Three) Times a Day With Meals., Disp: , Rfl:     spironolactone (ALDACTONE) 25 MG tablet, Take 1 tablet by mouth Every Other Day., Disp: 45 tablet, Rfl: 3    vitamin D (ERGOCALCIFEROL) 1.25 MG (62063 UT) capsule capsule, Take 1 capsule by mouth Every 14 (Fourteen) Days., Disp: , Rfl:     carvedilol (COREG) 12.5 MG tablet, Take 1 tablet by mouth 2 (Two) Times a Day With Meals for 30 days., Disp: 60 tablet, Rfl: 0    tobramycin PF (JUAN) 300 MG/5ML nebulizer solution, Take 5 mL by nebulization 2 (Two) Times a Day. 30 days on 30 days off, Disp: 300 mL, Rfl: 6     Objective   Physical Exam  Constitutional:       General: She is not in acute distress.     Appearance: Normal appearance. She is normal weight.   HENT:      Right Ear: Hearing normal.      Left Ear: Hearing normal.      Nose: No nasal tenderness or congestion.      Mouth/Throat:      Mouth: Mucous membranes are moist. No oral lesions.   Eyes:      Extraocular Movements: Extraocular movements intact.      Pupils: Pupils are equal, round, and reactive to light.   Cardiovascular:      Rate and Rhythm: Normal rate and regular rhythm.      Pulses: Normal pulses.      Heart sounds: Normal heart sounds. No murmur heard.  Pulmonary:      Effort: Pulmonary effort is normal.      Breath sounds: Decreased breath sounds present. No wheezing, rhonchi or rales.      Comments: Patient is on 2 L of oxygen.  She is able to speak full sentences without difficulty.  Musculoskeletal:      Right lower leg: No edema.      Left lower leg: No edema.   Skin:     General: Skin is warm and dry.      Findings: No lesion or rash.   Neurological:      General: No focal deficit present.      Mental Status: She is alert and oriented to person, place, and time.   Psychiatric:         Mood and Affect: Affect normal. Mood is not anxious or depressed.         Vital Signs:   BP (!) 150/33 (BP Location: Right arm, Patient Position: Sitting, Cuff Size: Adult) Comment: patient  "states it's normal for her. takes B/P medication  Pulse 94   Temp 97.6 °F (36.4 °C) (Oral)   Resp 14   Ht 165.1 cm (65\")   Wt 63.3 kg (139 lb 9.6 oz)   SpO2 99% Comment: 2 L's PD  BMI 23.23 kg/m²        Result Review :   The following data was reviewed by: DAT Ervin on 09/12/2024:  CMP          8/24/2024    05:15 8/25/2024    04:16 8/26/2024    05:06   CMP   Glucose 142  143  105    BUN 15  33  48    Creatinine 2.28  3.04  3.65    EGFR 20.3  14.4  11.6    Sodium 132  134  134    Potassium 5.2  4.8  4.2    Chloride 97  98  100    Calcium 8.6  8.2  8.3    Total Protein 5.9      Albumin 2.9      Globulin 3.0      Total Bilirubin 0.4      Alkaline Phosphatase 191      AST (SGOT) 24      ALT (SGPT) 9      Albumin/Globulin Ratio 1.0      BUN/Creatinine Ratio 6.6  10.9  13.2    Anion Gap 11.6  10.5  8.1      CBC w/diff          8/25/2024    04:16 8/26/2024    05:06 9/4/2024    00:00   CBC w/Diff   WBC 7.66  6.61     RBC 3.02  2.92     Hemoglobin 8.9  8.7  8.8        26.4       Hematocrit 30.1  28.5     MCV 99.7  97.6     MCH 29.5  29.8     MCHC 29.6  30.5     RDW 14.0  14.3     Platelets 154  159        Details          This result is from an external source.           Personally reviewed respiratory culture 10/29/2023 positive for Pseudomonas  So reviewed pneumonia panel 4/24/2024 positive for Pseudomonas        Data reviewed : Radiologic studies chest x-ray 8/21/2024, Cardiology studies echocardiogram 8/24/2024, Consultant notes Dr. Holcomb consult note 8/21/2024, and Recent hospitalization notes Dr. Holcomb's last hospital note 8/24/2024 Hospital discharge summary 8/26/2024    Procedures        Assessment and Plan    Diagnoses and all orders for this visit:    1. Chronic obstructive pulmonary disease, unspecified COPD type (Primary)  Comments:  Continue Pulmicort  Orders:  -     Alpha - 1 - Antitrypsin; Future  -     Cancel: Complete PFT - Pre & Post Bronchodilator; Future  -     albuterol sulfate HFA " 108 (90 Base) MCG/ACT inhaler; Inhale 2 puffs Every 4 (Four) Hours As Needed for Wheezing.  Dispense: 18 g; Refill: 11    2. Chronic heart failure with preserved ejection fraction  Comments:  Continue follow-up with cardiology    3. ESRD (end stage renal disease) on dialysis  Comments:  Continue follow-up with nephrology    4. Acute on chronic respiratory failure with hypoxia  Comments:  Continue oxygen    5. Nicotine dependence, cigarettes, in remission  -     Alpha - 1 - Antitrypsin; Future    6. Pseudomonas respiratory infection  Comments:  Start tobramycin nebulizer treatments  Orders:  -     tobramycin PF (JUAN) 300 MG/5ML nebulizer solution; Take 5 mL by nebulization 2 (Two) Times a Day. 30 days on 30 days off  Dispense: 300 mL; Refill: 6    Offered patient a pulmonary function test and she politely declines.    I spent 43 minutes caring for Charlette on this date of service. This time includes time spent by me in the following activities:preparing for the visit, reviewing tests, obtaining and/or reviewing a separately obtained history, performing a medically appropriate examination and/or evaluation , counseling and educating the patient/family/caregiver, ordering medications, tests, or procedures, and documenting information in the medical record    Follow Up   Return in about 3 months (around 12/12/2024) for Recheck with Dr. Yanez.  Patient was given instructions and counseling regarding her condition or for health maintenance advice. Please see specific information pulled into the AVS if appropriate.

## 2024-09-15 ENCOUNTER — HOSPITAL ENCOUNTER (EMERGENCY)
Facility: HOSPITAL | Age: 89
Discharge: HOME | End: 2024-09-15
Attending: EMERGENCY MEDICINE | Admitting: EMERGENCY MEDICINE
Payer: MEDICARE

## 2024-09-15 ENCOUNTER — APPOINTMENT (OUTPATIENT)
Dept: GENERAL RADIOLOGY | Facility: HOSPITAL | Age: 89
End: 2024-09-15
Payer: MEDICARE

## 2024-09-15 VITALS
RESPIRATION RATE: 17 BRPM | WEIGHT: 139.55 LBS | DIASTOLIC BLOOD PRESSURE: 50 MMHG | BODY MASS INDEX: 23.82 KG/M2 | SYSTOLIC BLOOD PRESSURE: 159 MMHG | TEMPERATURE: 98 F | HEART RATE: 81 BPM | HEIGHT: 64 IN | OXYGEN SATURATION: 92 %

## 2024-09-15 DIAGNOSIS — N18.6 END STAGE RENAL DISEASE: ICD-10-CM

## 2024-09-15 DIAGNOSIS — J44.1 COPD EXACERBATION: Primary | ICD-10-CM

## 2024-09-15 LAB
ALBUMIN SERPL-MCNC: 3.2 G/DL (ref 3.5–5.2)
ALBUMIN/GLOB SERPL: 1.1 G/DL
ALP SERPL-CCNC: 204 U/L (ref 39–117)
ALT SERPL W P-5'-P-CCNC: 13 U/L (ref 1–33)
ANION GAP SERPL CALCULATED.3IONS-SCNC: 9.6 MMOL/L (ref 5–15)
AST SERPL-CCNC: 22 U/L (ref 1–32)
BASOPHILS # BLD AUTO: 0.03 10*3/MM3 (ref 0–0.2)
BASOPHILS NFR BLD AUTO: 0.6 % (ref 0–1.5)
BILIRUB SERPL-MCNC: 0.3 MG/DL (ref 0–1.2)
BUN SERPL-MCNC: 39 MG/DL (ref 8–23)
BUN/CREAT SERPL: 10.2 (ref 7–25)
CALCIUM SPEC-SCNC: 8.4 MG/DL (ref 8.6–10.5)
CHLORIDE SERPL-SCNC: 99 MMOL/L (ref 98–107)
CO2 SERPL-SCNC: 32.4 MMOL/L (ref 22–29)
CREAT SERPL-MCNC: 3.84 MG/DL (ref 0.57–1)
D-LACTATE SERPL-SCNC: 0.9 MMOL/L (ref 0.5–2)
DEPRECATED RDW RBC AUTO: 57 FL (ref 37–54)
EGFRCR SERPLBLD CKD-EPI 2021: 10.9 ML/MIN/1.73
EOSINOPHIL # BLD AUTO: 0.19 10*3/MM3 (ref 0–0.4)
EOSINOPHIL NFR BLD AUTO: 4 % (ref 0.3–6.2)
ERYTHROCYTE [DISTWIDTH] IN BLOOD BY AUTOMATED COUNT: 14.7 % (ref 12.3–15.4)
GLOBULIN UR ELPH-MCNC: 3 GM/DL
GLUCOSE SERPL-MCNC: 138 MG/DL (ref 65–99)
HCT VFR BLD AUTO: 30.7 % (ref 34–46.6)
HGB BLD-MCNC: 8.6 G/DL (ref 12–15.9)
HOLD SPECIMEN: NORMAL
HOLD SPECIMEN: NORMAL
IMM GRANULOCYTES # BLD AUTO: 0 10*3/MM3 (ref 0–0.05)
IMM GRANULOCYTES NFR BLD AUTO: 0 % (ref 0–0.5)
LYMPHOCYTES # BLD AUTO: 0.52 10*3/MM3 (ref 0.7–3.1)
LYMPHOCYTES NFR BLD AUTO: 11 % (ref 19.6–45.3)
MACROCYTES BLD QL SMEAR: NORMAL
MCH RBC QN AUTO: 29.6 PG (ref 26.6–33)
MCHC RBC AUTO-ENTMCNC: 28 G/DL (ref 31.5–35.7)
MCV RBC AUTO: 105.5 FL (ref 79–97)
MONOCYTES # BLD AUTO: 0.63 10*3/MM3 (ref 0.1–0.9)
MONOCYTES NFR BLD AUTO: 13.3 % (ref 5–12)
NEUTROPHILS NFR BLD AUTO: 3.37 10*3/MM3 (ref 1.7–7)
NEUTROPHILS NFR BLD AUTO: 71.1 % (ref 42.7–76)
NRBC BLD AUTO-RTO: 0 /100 WBC (ref 0–0.2)
NT-PROBNP SERPL-MCNC: ABNORMAL PG/ML (ref 0–1800)
PLATELET # BLD AUTO: 138 10*3/MM3 (ref 140–450)
PMV BLD AUTO: 10.3 FL (ref 6–12)
POTASSIUM SERPL-SCNC: 5.2 MMOL/L (ref 3.5–5.2)
PROT SERPL-MCNC: 6.2 G/DL (ref 6–8.5)
RBC # BLD AUTO: 2.91 10*6/MM3 (ref 3.77–5.28)
SMALL PLATELETS BLD QL SMEAR: NORMAL
SODIUM SERPL-SCNC: 141 MMOL/L (ref 136–145)
TROPONIN T SERPL HS-MCNC: 60 NG/L
WBC MORPH BLD: NORMAL
WBC NRBC COR # BLD AUTO: 4.74 10*3/MM3 (ref 3.4–10.8)
WHOLE BLOOD HOLD COAG: NORMAL
WHOLE BLOOD HOLD SPECIMEN: NORMAL

## 2024-09-15 PROCEDURE — 94640 AIRWAY INHALATION TREATMENT: CPT

## 2024-09-15 PROCEDURE — 93005 ELECTROCARDIOGRAM TRACING: CPT | Performed by: EMERGENCY MEDICINE

## 2024-09-15 PROCEDURE — 93005 ELECTROCARDIOGRAM TRACING: CPT

## 2024-09-15 PROCEDURE — 36415 COLL VENOUS BLD VENIPUNCTURE: CPT

## 2024-09-15 PROCEDURE — 85025 COMPLETE CBC W/AUTO DIFF WBC: CPT

## 2024-09-15 PROCEDURE — 87040 BLOOD CULTURE FOR BACTERIA: CPT

## 2024-09-15 PROCEDURE — 71045 X-RAY EXAM CHEST 1 VIEW: CPT

## 2024-09-15 PROCEDURE — 83605 ASSAY OF LACTIC ACID: CPT | Performed by: EMERGENCY MEDICINE

## 2024-09-15 PROCEDURE — 63710000001 PREDNISONE PER 5 MG: Performed by: EMERGENCY MEDICINE

## 2024-09-15 PROCEDURE — 84484 ASSAY OF TROPONIN QUANT: CPT | Performed by: EMERGENCY MEDICINE

## 2024-09-15 PROCEDURE — 80053 COMPREHEN METABOLIC PANEL: CPT | Performed by: EMERGENCY MEDICINE

## 2024-09-15 PROCEDURE — 99284 EMERGENCY DEPT VISIT MOD MDM: CPT

## 2024-09-15 PROCEDURE — 94799 UNLISTED PULMONARY SVC/PX: CPT

## 2024-09-15 PROCEDURE — 83880 ASSAY OF NATRIURETIC PEPTIDE: CPT | Performed by: EMERGENCY MEDICINE

## 2024-09-15 PROCEDURE — 85007 BL SMEAR W/DIFF WBC COUNT: CPT | Performed by: EMERGENCY MEDICINE

## 2024-09-15 RX ORDER — IPRATROPIUM BROMIDE AND ALBUTEROL SULFATE 2.5; .5 MG/3ML; MG/3ML
3 SOLUTION RESPIRATORY (INHALATION) ONCE
Status: COMPLETED | OUTPATIENT
Start: 2024-09-15 | End: 2024-09-15

## 2024-09-15 RX ORDER — SODIUM CHLORIDE 0.9 % (FLUSH) 0.9 %
10 SYRINGE (ML) INJECTION AS NEEDED
Status: DISCONTINUED | OUTPATIENT
Start: 2024-09-15 | End: 2024-09-16 | Stop reason: HOSPADM

## 2024-09-15 RX ORDER — PREDNISONE 20 MG/1
TABLET ORAL
Qty: 10 TABLET | Refills: 0 | Status: SHIPPED | OUTPATIENT
Start: 2024-09-15

## 2024-09-15 RX ADMIN — IPRATROPIUM BROMIDE AND ALBUTEROL SULFATE 3 ML: .5; 3 SOLUTION RESPIRATORY (INHALATION) at 21:40

## 2024-09-15 RX ADMIN — PREDNISONE 60 MG: 50 TABLET ORAL at 22:10

## 2024-09-17 ENCOUNTER — OFFICE VISIT (OUTPATIENT)
Dept: CARDIOLOGY | Facility: CLINIC | Age: 89
End: 2024-09-17
Payer: MEDICARE

## 2024-09-17 VITALS
BODY MASS INDEX: 23.73 KG/M2 | HEART RATE: 90 BPM | HEIGHT: 64 IN | SYSTOLIC BLOOD PRESSURE: 175 MMHG | WEIGHT: 139 LBS | DIASTOLIC BLOOD PRESSURE: 52 MMHG

## 2024-09-17 DIAGNOSIS — I10 ESSENTIAL HYPERTENSION: ICD-10-CM

## 2024-09-17 DIAGNOSIS — E78.5 HYPERLIPIDEMIA LDL GOAL <70: ICD-10-CM

## 2024-09-17 DIAGNOSIS — I50.32 CHRONIC HEART FAILURE WITH PRESERVED EJECTION FRACTION: Primary | ICD-10-CM

## 2024-09-17 PROBLEM — E46 UNSPECIFIED PROTEIN-CALORIE MALNUTRITION: Status: ACTIVE | Noted: 2024-06-17

## 2024-09-17 PROBLEM — I13.2 HYPERTENSIVE HEART AND CHRONIC KIDNEY DISEASE WITH HEART FAILURE AND WITH STAGE 5 CHRONIC KIDNEY DISEASE, OR END STAGE RENAL DISEASE: Status: ACTIVE | Noted: 2024-05-21

## 2024-09-17 PROCEDURE — 99214 OFFICE O/P EST MOD 30 MIN: CPT | Performed by: NURSE PRACTITIONER

## 2024-09-17 RX ORDER — CARVEDILOL 12.5 MG/1
12.5 TABLET ORAL 2 TIMES DAILY WITH MEALS
Qty: 180 TABLET | Refills: 3 | Status: SHIPPED | OUTPATIENT
Start: 2024-09-17 | End: 2024-09-24 | Stop reason: SDUPTHER

## 2024-09-17 RX ORDER — ROSUVASTATIN CALCIUM 20 MG/1
20 TABLET, COATED ORAL NIGHTLY
Qty: 90 TABLET | Refills: 3 | Status: SHIPPED | OUTPATIENT
Start: 2024-09-17

## 2024-09-17 RX ORDER — ISOSORBIDE MONONITRATE 30 MG/1
30 TABLET, EXTENDED RELEASE ORAL NIGHTLY
Qty: 90 TABLET | Refills: 3 | Status: SHIPPED | OUTPATIENT
Start: 2024-09-17

## 2024-09-19 LAB
QT INTERVAL: 417 MS
QTC INTERVAL: 489 MS

## 2024-09-20 LAB
BACTERIA SPEC AEROBE CULT: NORMAL
BACTERIA SPEC AEROBE CULT: NORMAL

## 2024-09-24 ENCOUNTER — OFFICE VISIT (OUTPATIENT)
Dept: CARDIOLOGY | Facility: CLINIC | Age: 89
End: 2024-09-24
Payer: MEDICARE

## 2024-09-24 ENCOUNTER — CONSULT (OUTPATIENT)
Dept: ONCOLOGY | Facility: HOSPITAL | Age: 89
End: 2024-09-24
Payer: MEDICARE

## 2024-09-24 ENCOUNTER — LAB (OUTPATIENT)
Dept: ONCOLOGY | Facility: HOSPITAL | Age: 89
End: 2024-09-24
Payer: MEDICARE

## 2024-09-24 VITALS
DIASTOLIC BLOOD PRESSURE: 36 MMHG | SYSTOLIC BLOOD PRESSURE: 130 MMHG | WEIGHT: 136 LBS | BODY MASS INDEX: 23.22 KG/M2 | HEART RATE: 88 BPM | HEIGHT: 64 IN

## 2024-09-24 VITALS
BODY MASS INDEX: 22.96 KG/M2 | HEIGHT: 64 IN | DIASTOLIC BLOOD PRESSURE: 30 MMHG | WEIGHT: 134.48 LBS | SYSTOLIC BLOOD PRESSURE: 130 MMHG | RESPIRATION RATE: 16 BRPM | OXYGEN SATURATION: 100 % | TEMPERATURE: 97.4 F | HEART RATE: 85 BPM

## 2024-09-24 DIAGNOSIS — Z99.2 ESRD (END STAGE RENAL DISEASE) ON DIALYSIS: ICD-10-CM

## 2024-09-24 DIAGNOSIS — I10 ESSENTIAL HYPERTENSION: ICD-10-CM

## 2024-09-24 DIAGNOSIS — I50.32 CHRONIC HEART FAILURE WITH PRESERVED EJECTION FRACTION: ICD-10-CM

## 2024-09-24 DIAGNOSIS — N18.4 STAGE 4 CHRONIC KIDNEY DISEASE: ICD-10-CM

## 2024-09-24 DIAGNOSIS — N18.6 ANEMIA DUE TO CHRONIC KIDNEY DISEASE, ON CHRONIC DIALYSIS: ICD-10-CM

## 2024-09-24 DIAGNOSIS — Z99.2 ANEMIA DUE TO CHRONIC KIDNEY DISEASE, ON CHRONIC DIALYSIS: ICD-10-CM

## 2024-09-24 DIAGNOSIS — D63.1 ANEMIA DUE TO CHRONIC KIDNEY DISEASE, ON CHRONIC DIALYSIS: Primary | ICD-10-CM

## 2024-09-24 DIAGNOSIS — E78.5 HYPERLIPIDEMIA LDL GOAL <70: ICD-10-CM

## 2024-09-24 DIAGNOSIS — R76.9 ABNORMAL SERUM IMMUNOELECTROPHORESIS: ICD-10-CM

## 2024-09-24 DIAGNOSIS — N18.6 ANEMIA DUE TO CHRONIC KIDNEY DISEASE, ON CHRONIC DIALYSIS: Primary | ICD-10-CM

## 2024-09-24 DIAGNOSIS — N18.6 ESRD (END STAGE RENAL DISEASE) ON DIALYSIS: ICD-10-CM

## 2024-09-24 DIAGNOSIS — I50.32 CHRONIC HEART FAILURE WITH PRESERVED EJECTION FRACTION: Primary | ICD-10-CM

## 2024-09-24 DIAGNOSIS — Z99.2 ANEMIA DUE TO CHRONIC KIDNEY DISEASE, ON CHRONIC DIALYSIS: Primary | ICD-10-CM

## 2024-09-24 DIAGNOSIS — D63.1 ANEMIA DUE TO CHRONIC KIDNEY DISEASE, ON CHRONIC DIALYSIS: ICD-10-CM

## 2024-09-24 LAB
BASOPHILS # BLD AUTO: 0.01 10*3/MM3 (ref 0–0.2)
BASOPHILS NFR BLD AUTO: 0.1 % (ref 0–1.5)
DEPRECATED RDW RBC AUTO: 60.4 FL (ref 37–54)
EOSINOPHIL # BLD AUTO: 0.22 10*3/MM3 (ref 0–0.4)
EOSINOPHIL NFR BLD AUTO: 3 % (ref 0.3–6.2)
ERYTHROCYTE [DISTWIDTH] IN BLOOD BY AUTOMATED COUNT: 15.6 % (ref 12.3–15.4)
FERRITIN SERPL-MCNC: 1638 NG/ML (ref 13–150)
FOLATE SERPL-MCNC: 9.22 NG/ML (ref 4.78–24.2)
HCT VFR BLD AUTO: 34.5 % (ref 34–46.6)
HGB BLD-MCNC: 9.9 G/DL (ref 12–15.9)
IMM GRANULOCYTES # BLD AUTO: 0.03 10*3/MM3 (ref 0–0.05)
IMM GRANULOCYTES NFR BLD AUTO: 0.4 % (ref 0–0.5)
IRON 24H UR-MRATE: 28 MCG/DL (ref 37–145)
IRON SATN MFR SERPL: 13 % (ref 20–50)
LYMPHOCYTES # BLD AUTO: 0.56 10*3/MM3 (ref 0.7–3.1)
LYMPHOCYTES NFR BLD AUTO: 7.6 % (ref 19.6–45.3)
MCH RBC QN AUTO: 30.7 PG (ref 26.6–33)
MCHC RBC AUTO-ENTMCNC: 28.7 G/DL (ref 31.5–35.7)
MCV RBC AUTO: 106.8 FL (ref 79–97)
MONOCYTES # BLD AUTO: 0.86 10*3/MM3 (ref 0.1–0.9)
MONOCYTES NFR BLD AUTO: 11.7 % (ref 5–12)
NEUTROPHILS NFR BLD AUTO: 5.66 10*3/MM3 (ref 1.7–7)
NEUTROPHILS NFR BLD AUTO: 77.2 % (ref 42.7–76)
PLATELET # BLD AUTO: 131 10*3/MM3 (ref 140–450)
PMV BLD AUTO: 9.8 FL (ref 6–12)
RBC # BLD AUTO: 3.23 10*6/MM3 (ref 3.77–5.28)
TIBC SERPL-MCNC: 221 MCG/DL (ref 298–536)
TRANSFERRIN SERPL-MCNC: 148 MG/DL (ref 200–360)
VIT B12 BLD-MCNC: 480 PG/ML (ref 211–946)
WBC NRBC COR # BLD AUTO: 7.34 10*3/MM3 (ref 3.4–10.8)

## 2024-09-24 PROCEDURE — 99204 OFFICE O/P NEW MOD 45 MIN: CPT | Performed by: INTERNAL MEDICINE

## 2024-09-24 PROCEDURE — G0463 HOSPITAL OUTPT CLINIC VISIT: HCPCS | Performed by: INTERNAL MEDICINE

## 2024-09-24 PROCEDURE — 83540 ASSAY OF IRON: CPT

## 2024-09-24 PROCEDURE — G2211 COMPLEX E/M VISIT ADD ON: HCPCS

## 2024-09-24 PROCEDURE — 85025 COMPLETE CBC W/AUTO DIFF WBC: CPT

## 2024-09-24 PROCEDURE — 82746 ASSAY OF FOLIC ACID SERUM: CPT

## 2024-09-24 PROCEDURE — 82728 ASSAY OF FERRITIN: CPT

## 2024-09-24 PROCEDURE — 82607 VITAMIN B-12: CPT

## 2024-09-24 PROCEDURE — 99214 OFFICE O/P EST MOD 30 MIN: CPT

## 2024-09-24 PROCEDURE — 1160F RVW MEDS BY RX/DR IN RCRD: CPT

## 2024-09-24 PROCEDURE — G2211 COMPLEX E/M VISIT ADD ON: HCPCS | Performed by: INTERNAL MEDICINE

## 2024-09-24 PROCEDURE — 84466 ASSAY OF TRANSFERRIN: CPT

## 2024-09-24 PROCEDURE — 36415 COLL VENOUS BLD VENIPUNCTURE: CPT

## 2024-09-24 PROCEDURE — 1125F AMNT PAIN NOTED PAIN PRSNT: CPT | Performed by: INTERNAL MEDICINE

## 2024-09-24 PROCEDURE — 1159F MED LIST DOCD IN RCRD: CPT

## 2024-09-24 RX ORDER — LIDOCAINE/PRILOCAINE 2.5 %-2.5%
CREAM (GRAM) TOPICAL
COMMUNITY
Start: 2024-09-13

## 2024-09-24 RX ORDER — CARVEDILOL 25 MG/1
25 TABLET ORAL 2 TIMES DAILY WITH MEALS
Qty: 180 TABLET | Refills: 3 | Status: SHIPPED | OUTPATIENT
Start: 2024-09-24

## 2024-09-26 ENCOUNTER — TELEPHONE (OUTPATIENT)
Dept: INTERNAL MEDICINE | Age: 89
End: 2024-09-26
Payer: MEDICARE

## 2024-09-26 NOTE — TELEPHONE ENCOUNTER
[facility]arson called , just letting us know that the pt has Dialysis on Mon, Wed and Friday, She was unable to do PT on Tuesday due to personal appts as well as today. Karson will be doing a reassessment for PT next week. EDI

## 2024-09-30 ENCOUNTER — HOSPITAL ENCOUNTER (EMERGENCY)
Facility: HOSPITAL | Age: 89
Discharge: HOME | End: 2024-10-01
Attending: EMERGENCY MEDICINE
Payer: MEDICARE

## 2024-09-30 ENCOUNTER — APPOINTMENT (OUTPATIENT)
Dept: GENERAL RADIOLOGY | Facility: HOSPITAL | Age: 89
End: 2024-09-30
Payer: MEDICARE

## 2024-09-30 DIAGNOSIS — I50.32 CHRONIC DIASTOLIC CONGESTIVE HEART FAILURE: ICD-10-CM

## 2024-09-30 DIAGNOSIS — N18.6 STAGE 5 CHRONIC KIDNEY DISEASE ON CHRONIC DIALYSIS: ICD-10-CM

## 2024-09-30 DIAGNOSIS — J44.9 CHRONIC OBSTRUCTIVE PULMONARY DISEASE, UNSPECIFIED COPD TYPE: Primary | ICD-10-CM

## 2024-09-30 DIAGNOSIS — Z99.2 STAGE 5 CHRONIC KIDNEY DISEASE ON CHRONIC DIALYSIS: ICD-10-CM

## 2024-09-30 LAB
ALBUMIN SERPL-MCNC: 3.3 G/DL (ref 3.5–5.2)
ALBUMIN/GLOB SERPL: 1.3 G/DL
ALP SERPL-CCNC: 234 U/L (ref 39–117)
ALT SERPL W P-5'-P-CCNC: 13 U/L (ref 1–33)
ANION GAP SERPL CALCULATED.3IONS-SCNC: 6.2 MMOL/L (ref 5–15)
AST SERPL-CCNC: 19 U/L (ref 1–32)
BASOPHILS # BLD AUTO: 0.02 10*3/MM3 (ref 0–0.2)
BASOPHILS NFR BLD AUTO: 0.3 % (ref 0–1.5)
BILIRUB SERPL-MCNC: 0.3 MG/DL (ref 0–1.2)
BUN SERPL-MCNC: 21 MG/DL (ref 8–23)
BUN/CREAT SERPL: 8.4 (ref 7–25)
CALCIUM SPEC-SCNC: 8.4 MG/DL (ref 8.6–10.5)
CHLORIDE SERPL-SCNC: 100 MMOL/L (ref 98–107)
CO2 SERPL-SCNC: 36.8 MMOL/L (ref 22–29)
CREAT SERPL-MCNC: 2.49 MG/DL (ref 0.57–1)
DEPRECATED RDW RBC AUTO: 57.9 FL (ref 37–54)
EGFRCR SERPLBLD CKD-EPI 2021: 18.3 ML/MIN/1.73
EOSINOPHIL # BLD AUTO: 0.09 10*3/MM3 (ref 0–0.4)
EOSINOPHIL NFR BLD AUTO: 1.5 % (ref 0.3–6.2)
ERYTHROCYTE [DISTWIDTH] IN BLOOD BY AUTOMATED COUNT: 15.1 % (ref 12.3–15.4)
GLOBULIN UR ELPH-MCNC: 2.5 GM/DL
GLUCOSE SERPL-MCNC: 140 MG/DL (ref 65–99)
HCT VFR BLD AUTO: 32.4 % (ref 34–46.6)
HGB BLD-MCNC: 9.4 G/DL (ref 12–15.9)
HOLD SPECIMEN: NORMAL
HOLD SPECIMEN: NORMAL
IMM GRANULOCYTES # BLD AUTO: 0.02 10*3/MM3 (ref 0–0.05)
IMM GRANULOCYTES NFR BLD AUTO: 0.3 % (ref 0–0.5)
LYMPHOCYTES # BLD AUTO: 0.69 10*3/MM3 (ref 0.7–3.1)
LYMPHOCYTES NFR BLD AUTO: 11.5 % (ref 19.6–45.3)
MCH RBC QN AUTO: 30.4 PG (ref 26.6–33)
MCHC RBC AUTO-ENTMCNC: 29 G/DL (ref 31.5–35.7)
MCV RBC AUTO: 104.9 FL (ref 79–97)
MONOCYTES # BLD AUTO: 0.84 10*3/MM3 (ref 0.1–0.9)
MONOCYTES NFR BLD AUTO: 14 % (ref 5–12)
NEUTROPHILS NFR BLD AUTO: 4.33 10*3/MM3 (ref 1.7–7)
NEUTROPHILS NFR BLD AUTO: 72.4 % (ref 42.7–76)
NRBC BLD AUTO-RTO: 0 /100 WBC (ref 0–0.2)
NT-PROBNP SERPL-MCNC: ABNORMAL PG/ML (ref 0–1800)
PLATELET # BLD AUTO: 118 10*3/MM3 (ref 140–450)
PMV BLD AUTO: 9.8 FL (ref 6–12)
POTASSIUM SERPL-SCNC: 4.5 MMOL/L (ref 3.5–5.2)
PROT SERPL-MCNC: 5.8 G/DL (ref 6–8.5)
RBC # BLD AUTO: 3.09 10*6/MM3 (ref 3.77–5.28)
SODIUM SERPL-SCNC: 143 MMOL/L (ref 136–145)
TROPONIN T SERPL HS-MCNC: 72 NG/L
WBC NRBC COR # BLD AUTO: 5.99 10*3/MM3 (ref 3.4–10.8)
WHOLE BLOOD HOLD COAG: NORMAL
WHOLE BLOOD HOLD SPECIMEN: NORMAL

## 2024-09-30 PROCEDURE — 93010 ELECTROCARDIOGRAM REPORT: CPT | Performed by: INTERNAL MEDICINE

## 2024-09-30 PROCEDURE — 85025 COMPLETE CBC W/AUTO DIFF WBC: CPT

## 2024-09-30 PROCEDURE — 83880 ASSAY OF NATRIURETIC PEPTIDE: CPT | Performed by: EMERGENCY MEDICINE

## 2024-09-30 PROCEDURE — 84484 ASSAY OF TROPONIN QUANT: CPT | Performed by: EMERGENCY MEDICINE

## 2024-09-30 PROCEDURE — 99284 EMERGENCY DEPT VISIT MOD MDM: CPT

## 2024-09-30 PROCEDURE — 93005 ELECTROCARDIOGRAM TRACING: CPT | Performed by: EMERGENCY MEDICINE

## 2024-09-30 PROCEDURE — 36415 COLL VENOUS BLD VENIPUNCTURE: CPT

## 2024-09-30 PROCEDURE — 93005 ELECTROCARDIOGRAM TRACING: CPT

## 2024-09-30 PROCEDURE — 80053 COMPREHEN METABOLIC PANEL: CPT | Performed by: EMERGENCY MEDICINE

## 2024-09-30 PROCEDURE — 71045 X-RAY EXAM CHEST 1 VIEW: CPT

## 2024-09-30 RX ORDER — IPRATROPIUM BROMIDE AND ALBUTEROL SULFATE 2.5; .5 MG/3ML; MG/3ML
3 SOLUTION RESPIRATORY (INHALATION) ONCE
Status: COMPLETED | OUTPATIENT
Start: 2024-10-01 | End: 2024-10-01

## 2024-09-30 RX ORDER — SODIUM CHLORIDE 0.9 % (FLUSH) 0.9 %
10 SYRINGE (ML) INJECTION AS NEEDED
Status: DISCONTINUED | OUTPATIENT
Start: 2024-09-30 | End: 2024-10-01 | Stop reason: HOSPADM

## 2024-10-01 ENCOUNTER — TELEPHONE (OUTPATIENT)
Dept: INTERNAL MEDICINE | Age: 89
End: 2024-10-01

## 2024-10-01 VITALS
OXYGEN SATURATION: 99 % | DIASTOLIC BLOOD PRESSURE: 45 MMHG | WEIGHT: 136 LBS | BODY MASS INDEX: 23.22 KG/M2 | RESPIRATION RATE: 16 BRPM | HEIGHT: 64 IN | HEART RATE: 74 BPM | TEMPERATURE: 98.2 F | SYSTOLIC BLOOD PRESSURE: 134 MMHG

## 2024-10-01 PROCEDURE — 94640 AIRWAY INHALATION TREATMENT: CPT

## 2024-10-01 PROCEDURE — 94799 UNLISTED PULMONARY SVC/PX: CPT

## 2024-10-01 RX ORDER — ACETAMINOPHEN 325 MG/1
650 TABLET ORAL EVERY 6 HOURS PRN
Status: DISCONTINUED | OUTPATIENT
Start: 2024-10-01 | End: 2024-10-01 | Stop reason: HOSPADM

## 2024-10-01 RX ORDER — PREDNISONE 20 MG/1
20 TABLET ORAL 2 TIMES DAILY
Qty: 10 TABLET | Refills: 0 | Status: SHIPPED | OUTPATIENT
Start: 2024-10-01

## 2024-10-01 RX ORDER — METHYLPREDNISOLONE 4 MG
TABLET, DOSE PACK ORAL
Qty: 21 TABLET | Refills: 0 | Status: CANCELLED | OUTPATIENT
Start: 2024-10-01 | End: 2024-10-07

## 2024-10-01 RX ORDER — AZITHROMYCIN 500 MG/1
500 TABLET, FILM COATED ORAL DAILY
Qty: 3 TABLET | Refills: 0 | Status: SHIPPED | OUTPATIENT
Start: 2024-10-01

## 2024-10-01 RX ADMIN — IPRATROPIUM BROMIDE AND ALBUTEROL SULFATE 3 ML: .5; 3 SOLUTION RESPIRATORY (INHALATION) at 00:17

## 2024-10-01 RX ADMIN — ACETAMINOPHEN 650 MG: 325 TABLET ORAL at 01:42

## 2024-10-01 NOTE — TELEPHONE ENCOUNTER
[facility] home health called stated pt is requesting discharge from physical therapy. Pt  stated has too many personal issues going on at this time

## 2024-10-01 NOTE — ED PROVIDER NOTES
Time: 11:41 PM EDT  Date of encounter:  9/30/2024  Independent Historian/Clinical History and Information was obtained by:   Patient    History is limited by: N/A    Chief Complaint   Patient presents with    Shortness of Breath         History of Present Illness:  Patient is a 89 y.o. year old female who presents to the emergency department for evaluation of chest tightness/cramping this afternoon.  She reports that she is currently on dialysis for end-stage renal disease.  Patient reports that after dialysis today when she got home she started experiencing chest tightness.  Chest tightness occurred for approximately 2 hours and then subsided.  Patient reports she has had these episodes in the past after dialysis but today's was the worst.  Patient reports she has a history of heart failure is currently being managed with Lasix.  Patient reports she is ambulatory.  Patient reports she has COPD currently on 3 L NC at home.  Patient denies any recent fevers, chills, syncopal episodes, nausea, vomiting, known sick exposures.    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 DENIES CP BUT DOES HAVE SOA CHRONIC ISSUE COPD/LUNG CANCER    COPD (chronic obstructive pulmonary disease)     FOLLOWED BY DR MARIAJOSE BAILEY    Coronary artery disease     DENIES  CP BUT DOES GET SOA MOST OF THE TIME WITH EXERTION BUT OCC AT REST CHRONIC ISSUE COPD/LUNG CANCER    Deep vein thrombosis     Diabetes mellitus     DOES NOT CHECK BS DAILY    Disease of thyroid gland     HYPOTHYROIDISM    Essential hypertension     Gastric ulcer     GERD (gastroesophageal reflux disease)     Heart murmur     History of transfusion     NO ISSUES POST TRANSFUSION WAS MANY YEARS AGO    Hyperlipemia     Leukocytopenia     FOLLOWED BY DR MIR BAILEY    Limb swelling     Lumbago     Lumbar spinal stenosis     Lung cancer     Migraine headache     Multiple joint pain     On home oxygen therapy     3L/NC PRN    Osteopenia     PNA (pneumonia) 05/04/2024    Pseudomonas pneumonia 04/29/2024    Reflux esophagitis     Shortness of breath     Thyroid nodule     HAS ULTRASOUND YEARLY BEING MONITORED    Vascular disease     Vitamin D deficiency      Past Surgical History:   Procedure Laterality Date    ABDOMINAL SURGERY      ANGIOPLASTY RENAL ARTERY Left 06/14/2024    stent placement    APPENDECTOMY      ARTERIOGRAM N/A 6/14/2024    Procedure: Arteriogram, right radial access, aortogram and renal arteriogram with possible intervention.;  Surgeon: Dio Miguel MD;  Location: [facility] CATH INVASIVE LOCATION;  Service: Peripheral Vascular;  Laterality: N/A;    ARTERIOVENOUS FISTULA/SHUNT SURGERY Left 05/10/2022    Procedure: Left basilic vein transposition;  Surgeon: Wan Barksdale MD;  Location: [facility] MAIN OR;  Service: Vascular;  Laterality: Left;    ARTERIOVENOUS FISTULA/SHUNT SURGERY Left 03/23/2023    Procedure: Ligation of left arm arteriovenous fistula;  Surgeon: Wan Barksdale MD;  Location: [facility] MAIN OR;  Service: Vascular;  Laterality: Left;    ARTERIOVENOUS FISTULA/SHUNT SURGERY Left 11/30/2023    Procedure: Creation of left arm arteriovenous graft;  Surgeon: Wan Barksdale MD;  Location: [facility] MAIN OR;  Service: Vascular;  Laterality: Left;    BRONCHOSCOPY N/A 04/24/2024    Procedure: BRONCHOSCOPY WITH  BAL AND WASHINGS;  Surgeon: Khalif Yanez MD;  Location: [facility] ENDOSCOPY;  Service: Pulmonary;  Laterality: N/A;  MUCUS PLUGGING    CARDIAC CATHETERIZATION  1996    CARDIAC SURGERY      CARDIAC SURGERY      fluid drained from heart    CATARACT EXTRACTION, BILATERAL  2003    COLONOSCOPY  2014    ENDOSCOPY  2016 2019    FEMORAL ARTERY STENT Bilateral     HYSTERECTOMY      LUNG BIOPSY Left 2005    lobectomy upper lung caner    LUNG VOLUME REDUCTION      OTHER SURGICAL HISTORY      artifical joints/limbs    REPLACEMENT TOTAL KNEE Left 2016    UPPER GASTROINTESTINAL ENDOSCOPY       Family History   Problem Relation Age of Onset    Arthritis Mother     Arthritis Father     Cancer Brother     Diabetes Brother     Other Brother         blood disease     Prostate cancer Brother     Cancer Brother     Prostate cancer Brother     Cancer Brother     Cancer Brother     Malig Hyperthermia Neg Hx     Colon cancer Neg Hx        Home Medications:  Prior to Admission medications    Medication Sig Start Date End Date Taking? Authorizing Provider   acetaminophen (TYLENOL) 325 MG tablet Take 2 tablets by mouth Every 6 (Six) Hours As Needed for Mild Pain.    Provider, MD Sarah   albuterol sulfate  (90 Base) MCG/ACT inhaler Inhale 2 puffs Every 4 (Four) Hours As Needed for Wheezing. 9/12/24   Laura Franco APRN   amLODIPine (NORVASC) 2.5 MG tablet Take 1 tablet by mouth As Needed (Once daily for a systolic blood pressure greater than 150). For a systolic blood pressure greater than 150 8/13/24   Halima Quick APRN   aspirin 81 MG EC tablet Take 1 tablet by mouth Daily.    Provider, MD Sarah   budesonide (Pulmicort) 0.5 MG/2ML nebulizer solution Take 2 mL by nebulization 2 (Two) Times a Day. 8/28/24   Brennan Mosqueda MD   carvedilol (COREG) 25 MG tablet Take 1 tablet by mouth 2 (Two) Times a Day With Meals. 9/24/24   Halima Quick APRN   clopidogrel (Plavix) 75 MG tablet Take 1 tablet by mouth  Daily. 6/15/24 6/15/25  Dio Miguel MD   dexlansoprazole (DEXILANT) 60 MG capsule Take 1 capsule by mouth Daily. 8/5/24   Brennan Mosqueda MD   fluticasone (FLONASE) 50 MCG/ACT nasal spray  9/12/24   Sarah Parker MD   furosemide (LASIX) 20 MG tablet Take 1 tablet by mouth Daily. 6/18/24   Halima Quick APRN   ipratropium-albuterol (DUO-NEB) 0.5-2.5 mg/3 ml nebulizer Take 3 mL by nebulization 4 (Four) Times a Day for 30 days. 8/28/24 9/27/24  Brennan Mosqueda MD   isosorbide mononitrate (IMDUR) 30 MG 24 hr tablet Take 1 tablet by mouth Every Night. 9/17/24   Jazzy Addison APRN   levocetirizine (XYZAL) 5 MG tablet TAKE 1 TABLET DAILY  Patient taking differently: Take 1 tablet by mouth Every Evening. 6/23/23   Paloma James MD   levothyroxine (Synthroid) 50 MCG tablet Take 0.5 tablets by mouth Daily. 7/2/24   Maria Teresa Malhotra MD   levothyroxine (SYNTHROID, LEVOTHROID) 25 MCG tablet Take 1 tablet by mouth Daily. 7/8/24   Sarah Parker MD   lidocaine-prilocaine (EMLA) 2.5-2.5 % cream apply to dialysis access site 30-45 minutes prior to dialysis 9/13/24   Sarah Parker MD   linagliptin (Tradjenta) 5 MG tablet tablet Take 1 tablet by mouth Daily. 1/10/22   Paloma James MD   losartan (Cozaar) 50 MG tablet Take 1 tablet by mouth 2 (Two) Times a Day. 7/16/24   Brennan Mosqueda MD   Methoxy PEG-Epoetin Beta (MIRCERA IJ) 60 mcg Every 14 (Fourteen) Days. 9/2/24 9/1/25  Sarah Parker MD   nitroglycerin (NITROSTAT) 0.4 MG SL tablet Place 1 tablet under the tongue Every 5 (Five) Minutes As Needed for Chest Pain.    Sarah Parker MD   ondansetron (ZOFRAN) 4 MG tablet Take 1 tablet by mouth Every 8 (Eight) Hours As Needed for Nausea or Vomiting.    Provider, MD Sarah   predniSONE (DELTASONE) 20 MG tablet Take 2 p.o. daily for 5 days. 9/15/24   Jose Manuel Saunders DO   rosuvastatin (CRESTOR) 20 MG tablet Take 1 tablet by mouth  "Every Night. 24   Jazzy Addison APRN   sevelamer (RENVELA) 800 MG tablet Take 1 tablet by mouth 3 (Three) Times a Day With Meals. 10/9/23   ProviderSarah MD   spironolactone (ALDACTONE) 25 MG tablet Take 1 tablet by mouth Every Other Day. 24   Maria Teresa Malhotra MD   tobramycin PF (JUAN) 300 MG/5ML nebulizer solution Take 5 mL by nebulization 2 (Two) Times a Day. 30 days on 30 days off 24   Laura Franco APRN   vitamin D (ERGOCALCIFEROL) 1.25 MG (05303 UT) capsule capsule Take 1 capsule by mouth Every 14 (Fourteen) Days.    Provider, MD Sarah        Social History:   Social History     Tobacco Use    Smoking status: Former     Current packs/day: 0.00     Average packs/day: 1 pack/day for 52.5 years (52.5 ttl pk-yrs)     Types: Cigarettes     Start date:      Quit date: 2004     Years since quittin.2     Passive exposure: Past    Smokeless tobacco: Never   Vaping Use    Vaping status: Never Used   Substance Use Topics    Alcohol use: Never    Drug use: Never         Review of Systems:  Review of Systems   Constitutional:  Negative for chills and fever.   HENT:  Negative for congestion, rhinorrhea and sore throat.    Eyes:  Negative for pain and visual disturbance.   Respiratory:  Positive for chest tightness and shortness of breath. Negative for apnea.    Cardiovascular:  Positive for leg swelling. Negative for chest pain and palpitations.   Gastrointestinal:  Negative for abdominal pain, diarrhea, nausea and vomiting.   Genitourinary:  Negative for difficulty urinating and dysuria.   Musculoskeletal:  Negative for joint swelling and myalgias.   Skin:  Negative for color change.   Neurological:  Negative for seizures and headaches.   Psychiatric/Behavioral: Negative.     All other systems reviewed and are negative.       Physical Exam:  /49   Pulse 73   Temp 98.2 °F (36.8 °C) (Oral)   Resp 16   Ht 162.6 cm (64.02\")   Wt 61.7 kg (136 lb)   LMP  (LMP " Unknown)   SpO2 100%   BMI 23.33 kg/m²         Physical Exam  Vitals and nursing note reviewed.   Constitutional:       General: She is not in acute distress.     Appearance: Normal appearance. She is not toxic-appearing.   HENT:      Head: Normocephalic and atraumatic.      Jaw: There is normal jaw occlusion.   Eyes:      General: Lids are normal.      Extraocular Movements: Extraocular movements intact.      Conjunctiva/sclera: Conjunctivae normal.      Pupils: Pupils are equal, round, and reactive to light.   Cardiovascular:      Rate and Rhythm: Normal rate and regular rhythm.      Pulses: Normal pulses.      Heart sounds: Normal heart sounds.      Comments: Bilateral radial, posterior tibial pulses 2+.  Pulmonary:      Effort: Pulmonary effort is normal. No respiratory distress.      Breath sounds: Wheezing present.   Chest:      Chest wall: No deformity.   Abdominal:      General: Abdomen is flat.      Palpations: Abdomen is soft.      Tenderness: There is no abdominal tenderness. There is no guarding or rebound.   Musculoskeletal:         General: Normal range of motion.      Cervical back: Normal range of motion and neck supple.      Right lower leg: Edema present.      Left lower leg: Edema present.      Comments: 1+ pitting   Skin:     General: Skin is warm and dry.   Neurological:      Mental Status: She is alert and oriented to person, place, and time. Mental status is at baseline.   Psychiatric:         Mood and Affect: Mood normal.                      Procedures:  Procedures      Medical Decision Making:      Comorbidities that affect care:    Stage renal disease on dialysis, Chronic Lung Disease, Congestive Heart Failure, COPD, Coronary Artery Disease    External Notes reviewed:    Previous ED Note: Previous ED encounter on 9/15/2024 reviewed.  Patient was having subtle shortness of breath.  Patient was treated with DuoNebs treated with outpatient prednisone.      The following orders were placed  and all results were independently analyzed by me:  Orders Placed This Encounter   Procedures    COVID PRE-OP / PRE-PROCEDURE SCREENING ORDER (NO ISOLATION) - Swab, Oropharynx    COVID-19, FLU A/B, RSV PCR 1 HR TAT - Swab, Nasopharynx    XR Chest 1 View    [place] Draw    Comprehensive Metabolic Panel    BNP    Single High Sensitivity Troponin T    CBC Auto Differential    High Sensitivity Troponin T 2Hr    NPO Diet NPO Type: Strict NPO    Undress & Gown    Continuous Pulse Oximetry    Vital Signs    Ambulatory O2 after duoneb treatment.  Misc Nursing Order (Specify)    Oxygen Therapy- Nasal Cannula; Titrate 1-6 LPM Per SpO2; 90 - 95%    ECG 12 Lead ED Triage Standing Order; SOA    Insert Peripheral IV    CBC & Differential    Green Top (Gel)    Lavender Top    Gold Top - SST    Light Blue Top       Medications Given in the Emergency Department:  Medications   sodium chloride 0.9 % flush 10 mL (has no administration in time range)   ipratropium-albuterol (DUO-NEB) nebulizer solution 3 mL (3 mL Nebulization Given 10/1/24 0017)        ED Course:    The patient was initially evaluated in the triage area where orders were placed. The patient was later dispositioned by Jeff Blood PA-C.      The patient was advised to stay for completion of workup which includes but is not limited to communication of labs and radiological results, reassessment and plan. The patient was advised that leaving prior to disposition by a provider could result in critical findings that are not communicated to the patient.     ED Course as of 10/01/24 0111   Tue Oct 01, 2024   0003 CMP reviewed.  Creatinine 2.49 and EGFR 18.3 consistent with prior CMP findings.  Patient on dialysis completed today.   [CB]   0003 Opponent elevated at 72.  Consistent with prior trends with known ESRD these results are elevated.  EKG revealed no acute findings of ischemia [CB]   0004 proBNP elevated.  Consistent with prior trends.  Value will be elevated as  expected with ESRD. [CB]   0004 X-ray chest reviewed.  No evidence of acute findings. [CB]      ED Course User Index  [CB] Jeff Blood, ANRDEINA       Labs:    Lab Results (last 24 hours)       Procedure Component Value Units Date/Time    CBC & Differential [[id number]]  (Abnormal) Collected: 09/30/24 2311    Specimen: Blood Updated: 09/30/24 2317    Narrative:      The following orders were created for panel order CBC & Differential.  Procedure                               Abnormality         Status                     ---------                               -----------         ------                     CBC Auto Differential[[id number]]        Abnormal            Final result                 Please view results for these tests on the individual orders.    Comprehensive Metabolic Panel [[id number]]  (Abnormal) Collected: 09/30/24 2311    Specimen: Blood Updated: 09/30/24 2337     Glucose 140 mg/dL      BUN 21 mg/dL      Creatinine 2.49 mg/dL      Sodium 143 mmol/L      Potassium 4.5 mmol/L      Chloride 100 mmol/L      CO2 36.8 mmol/L      Calcium 8.4 mg/dL      Total Protein 5.8 g/dL      Albumin 3.3 g/dL      ALT (SGPT) 13 U/L      AST (SGOT) 19 U/L      Alkaline Phosphatase 234 U/L      Total Bilirubin 0.3 mg/dL      Globulin 2.5 gm/dL      A/G Ratio 1.3 g/dL      BUN/Creatinine Ratio 8.4     Anion Gap 6.2 mmol/L      eGFR 18.3 mL/min/1.73     Narrative:      GFR Normal >60  Chronic Kidney Disease <60  Kidney Failure <15    The GFR formula is only valid for adults with stable renal function between ages 18 and 70.    BNP [[id number]]  (Abnormal) Collected: 09/30/24 2311    Specimen: Blood Updated: 09/30/24 2334     proBNP 17,913.0 pg/mL     Narrative:      This assay is used as an aid in the diagnosis of individuals suspected of having heart failure. It can be used as an aid in the diagnosis of acute decompensated heart failure (ADHF) in patients presenting with signs and symptoms of ADHF to the emergency  department (ED). In addition, NT-proBNP of <300 pg/mL indicates ADHF is not likely.    Age Range Result Interpretation  NT-proBNP Concentration (pg/mL:      <50             Positive            >450                   Gray                 300-450                    Negative             <300    50-75           Positive            >900                  Gray                300-900                  Negative            <300      >75             Positive            >1800                  Gray                300-1800                  Negative            <300    Single High Sensitivity Troponin T [[id number]]  (Abnormal) Collected: 09/30/24 2311    Specimen: Blood Updated: 09/30/24 2349     HS Troponin T 72 ng/L     Narrative:      High Sensitive Troponin T Reference Range:  <14.0 ng/L- Negative Female for AMI  <22.0 ng/L- Negative Male for AMI  >=14 - Abnormal Female indicating possible myocardial injury.  >=22 - Abnormal Male indicating possible myocardial injury.   Clinicians would have to utilize clinical acumen, EKG, Troponin, and serial changes to determine if it is an Acute Myocardial Infarction or myocardial injury due to an underlying chronic condition.         CBC Auto Differential [[id number]]  (Abnormal) Collected: 09/30/24 2311    Specimen: Blood Updated: 09/30/24 2317     WBC 5.99 10*3/mm3      RBC 3.09 10*6/mm3      Hemoglobin 9.4 g/dL      Hematocrit 32.4 %      .9 fL      MCH 30.4 pg      MCHC 29.0 g/dL      RDW 15.1 %      RDW-SD 57.9 fl      MPV 9.8 fL      Platelets 118 10*3/mm3      Neutrophil % 72.4 %      Lymphocyte % 11.5 %      Monocyte % 14.0 %      Eosinophil % 1.5 %      Basophil % 0.3 %      Immature Grans % 0.3 %      Neutrophils, Absolute 4.33 10*3/mm3      Lymphocytes, Absolute 0.69 10*3/mm3      Monocytes, Absolute 0.84 10*3/mm3      Eosinophils, Absolute 0.09 10*3/mm3      Basophils, Absolute 0.02 10*3/mm3      Immature Grans, Absolute 0.02 10*3/mm3      nRBC 0.0 /100 WBC               Imaging:    XR Chest 1 View    Result Date: 9/30/2024  XR CHEST 1 VW Date of Exam: 9/30/2024 10:56 PM EDT Indication: SOA Triage Protocol Comparison: 9/15/2024 Findings: Diffuse atherosclerotic disease is present. Heart borders are partially obscured. Redemonstrated is volume loss and postoperative change in the left hemithorax with pleural thickening and pleural plaquing. There is shift of the midline structures to the left that is stable. Right midlung chronic fibrotic changes also stable. The right lung is otherwise clear today. No pneumothorax on either side of the chest.     No clearly acute findings. Chronic changes in the left hemithorax appear stable, as does fibrosis in the right midlung. Electronically Signed: Weston Castillo MD  9/30/2024 11:06 PM EDT  Workstation ID: [id number]       Differential Diagnosis and Discussion:      Chest Pain:  Based on the patient's signs and symptoms, I considered aortic dissection, myocardial infaction, pulmonary embolism, cardiac tamponade, pericarditis, pneumothorax, musculoskeletal chest pain and other differential diagnosis as an etiology of the patient's chest pain.     All labs were reviewed and interpreted by me.    MDM     Amount and/or Complexity of Data Reviewed  Clinical lab tests: reviewed  Tests in the radiology section of CPT®: reviewed  Tests in the medicine section of CPT®: reviewed  Decide to obtain previous medical records or to obtain history from someone other than the patient: yes                     Patient Care Considerations:    SEPSIS was considered but is NOT present in the emergency department as SIRS criteria is not present.      Consultants/Shared Management Plan:    None    Social Determinants of Health:    Patient has presented with family members who are responsible, reliable and will ensure follow up care.      Disposition and Care Coordination:    Discharged: I considered escalation of care by admitting this patient to the hospital,  however patient does not meet sepsis/SIRS criteria.    I have explained the patient´s condition, diagnoses and treatment plan based on the information available to me at this time. I have answered questions and addressed any concerns. The patient has a good  understanding of the patient´s diagnosis, condition, and treatment plan as can be expected at this point. The vital signs have been stable. The patient´s condition is stable and appropriate for discharge from the emergency department.      The patient will pursue further outpatient evaluation with the primary care physician or other designated or consulting physician as outlined in the discharge instructions. They are agreeable to this plan of care and follow-up instructions have been explained in detail. The patient has received these instructions in written format and has expressed an understanding of the discharge instructions. The patient is aware that any significant change in condition or worsening of symptoms should prompt an immediate return to this or the closest emergency department or call to 911.  I have explained discharge medications and the need for follow up with the patient/caretakers. This was also printed in the discharge instructions. Patient was discharged with the following medications and follow up:      Medication List        Changed      levocetirizine 5 MG tablet  Commonly known as: XYZAL  TAKE 1 TABLET DAILY  What changed: when to take this     * predniSONE 20 MG tablet  Commonly known as: DELTASONE  Take 2 p.o. daily for 5 days.  What changed: Another medication with the same name was added. Make sure you understand how and when to take each.     * predniSONE 20 MG tablet  Commonly known as: DELTASONE  Take 1 tablet by mouth 2 (Two) Times a Day.  What changed: You were already taking a medication with the same name, and this prescription was added. Make sure you understand how and when to take each.           * This list has 2  medication(s) that are the same as other medications prescribed for you. Read the directions carefully, and ask your doctor or other care provider to review them with you.                   Where to Get Your Medications        These medications were sent to [facility] - [place], [place] - [address] - [phone]  - [phone] [address] [address]      Phone: [phone]   predniSONE 20 MG tablet      Brennan Mosqueda MD  [address] [place] [id number] [phone]             Final diagnoses:   Chronic obstructive pulmonary disease, unspecified COPD type   Chronic diastolic congestive heart failure   Stage 5 chronic kidney disease on chronic dialysis        ED Disposition       ED Disposition   Discharge    Condition   Stable    Comment   --               This medical record created using voice recognition software.             Jeff Blood PA-C  10/01/24 0427

## 2024-10-01 NOTE — ED NOTES
Per [facility], Patient comes come home. Patient had dialysis today. Patient started having soa episode 2 hours ago. And body cramping. Sky has copd, and wears 3l nc at home.

## 2024-10-01 NOTE — DISCHARGE INSTRUCTIONS
You for allowing us to provide care for you today.  Your workup today included blood work and chest x-ray imaging.  Your blood work today revealed no acute findings, with blood work trends comparable to previous findings.  Your EKG revealed no acute findings of heart attack/MI.  Your chest x-ray revealed no evidence of acute cardiac or pulmonary findings, but with evidence of chronic pulmonary disease.  As discussed, you will begin a 5-day regimen of prednisone for shortness of breath.  Recommend that you follow-up with your PCP for follow-up on chest tightness episode.  Recommend that you continue to complete dialysis as recommended.  In the event you develop new onset fever, chills, nausea, vomiting, shortness of breath, lightheadedness/dizziness/passing out please return to the ED immediately for care.

## 2024-10-04 LAB
QT INTERVAL: 431 MS
QTC INTERVAL: 496 MS

## 2024-10-24 RX ORDER — ERGOCALCIFEROL 1.25 MG/1
50000 CAPSULE, LIQUID FILLED ORAL
Qty: 6 CAPSULE | Refills: 3 | Status: SHIPPED | OUTPATIENT
Start: 2024-10-24

## 2024-10-24 NOTE — TELEPHONE ENCOUNTER
Pt needs a refill on Vit. D2 50,000IU   Takes 1 tab twice a month . Needs a 90 days supply with refills. Atiya jang

## 2024-10-31 ENCOUNTER — LAB (OUTPATIENT)
Dept: LAB | Facility: HOSPITAL | Age: 89
End: 2024-10-31
Payer: MEDICARE

## 2024-10-31 DIAGNOSIS — K21.9 GASTROESOPHAGEAL REFLUX DISEASE WITHOUT ESOPHAGITIS: ICD-10-CM

## 2024-10-31 DIAGNOSIS — Z99.81 OXYGEN DEPENDENT: ICD-10-CM

## 2024-10-31 DIAGNOSIS — E11.51 TYPE 2 DIABETES MELLITUS WITH DIABETIC PERIPHERAL ANGIOPATHY WITHOUT GANGRENE, WITHOUT LONG-TERM CURRENT USE OF INSULIN: ICD-10-CM

## 2024-10-31 DIAGNOSIS — M48.062 SPINAL STENOSIS OF LUMBAR REGION WITH NEUROGENIC CLAUDICATION: ICD-10-CM

## 2024-10-31 DIAGNOSIS — Z98.62 S/P RENAL ARTERY ANGIOPLASTY: ICD-10-CM

## 2024-10-31 DIAGNOSIS — N18.4 ANEMIA OF CHRONIC RENAL FAILURE, STAGE 4 (SEVERE): ICD-10-CM

## 2024-10-31 DIAGNOSIS — I70.1 RENAL ARTERY STENOSIS: ICD-10-CM

## 2024-10-31 DIAGNOSIS — I65.29 STENOSIS OF CAROTID ARTERY, UNSPECIFIED LATERALITY: ICD-10-CM

## 2024-10-31 DIAGNOSIS — I10 ESSENTIAL HYPERTENSION: ICD-10-CM

## 2024-10-31 DIAGNOSIS — I35.0 AORTIC STENOSIS, MODERATE: ICD-10-CM

## 2024-10-31 DIAGNOSIS — R54 FRAILTY SYNDROME IN GERIATRIC PATIENT: ICD-10-CM

## 2024-10-31 DIAGNOSIS — N18.6 ESRD (END STAGE RENAL DISEASE): ICD-10-CM

## 2024-10-31 DIAGNOSIS — Z00.00 MEDICARE ANNUAL WELLNESS VISIT, SUBSEQUENT: ICD-10-CM

## 2024-10-31 DIAGNOSIS — G63 POLYNEUROPATHY ASSOCIATED WITH UNDERLYING DISEASE: ICD-10-CM

## 2024-10-31 DIAGNOSIS — N18.6 END STAGE RENAL DISEASE ON DIALYSIS: ICD-10-CM

## 2024-10-31 DIAGNOSIS — I50.33 ACUTE ON CHRONIC HEART FAILURE WITH PRESERVED EJECTION FRACTION (HFPEF): ICD-10-CM

## 2024-10-31 DIAGNOSIS — Z99.2 END STAGE RENAL DISEASE ON DIALYSIS: ICD-10-CM

## 2024-10-31 DIAGNOSIS — N18.4 STAGE 4 CHRONIC KIDNEY DISEASE: ICD-10-CM

## 2024-10-31 DIAGNOSIS — E06.3 HYPOTHYROIDISM DUE TO HASHIMOTO'S THYROIDITIS: ICD-10-CM

## 2024-10-31 DIAGNOSIS — E55.9 VITAMIN D DEFICIENCY: ICD-10-CM

## 2024-10-31 DIAGNOSIS — I25.10 CORONARY ARTERY CALCIFICATION SEEN ON CT SCAN: ICD-10-CM

## 2024-10-31 DIAGNOSIS — I73.9 PERIPHERAL VASCULAR DISEASE OF LOWER EXTREMITY: ICD-10-CM

## 2024-10-31 DIAGNOSIS — E78.5 HYPERLIPIDEMIA LDL GOAL <70: ICD-10-CM

## 2024-10-31 DIAGNOSIS — Z87.891 EX-SMOKER: ICD-10-CM

## 2024-10-31 DIAGNOSIS — J44.0 COPD WITH LOWER RESPIRATORY INFECTION: ICD-10-CM

## 2024-10-31 DIAGNOSIS — E03.9 HYPOTHYROIDISM (ACQUIRED): ICD-10-CM

## 2024-10-31 DIAGNOSIS — I50.32 CHRONIC HEART FAILURE WITH PRESERVED EJECTION FRACTION: ICD-10-CM

## 2024-10-31 DIAGNOSIS — D47.2 MONOCLONAL PARAPROTEINEMIA: ICD-10-CM

## 2024-10-31 DIAGNOSIS — D63.1 ANEMIA OF CHRONIC RENAL FAILURE, STAGE 4 (SEVERE): ICD-10-CM

## 2024-10-31 LAB
ALBUMIN SERPL-MCNC: 3.5 G/DL (ref 3.5–5.2)
ALBUMIN/GLOB SERPL: 1.4 G/DL
ALP SERPL-CCNC: 277 U/L (ref 39–117)
ALT SERPL W P-5'-P-CCNC: 16 U/L (ref 1–33)
ANION GAP SERPL CALCULATED.3IONS-SCNC: 8 MMOL/L (ref 5–15)
AST SERPL-CCNC: 25 U/L (ref 1–32)
BASOPHILS # BLD AUTO: 0.03 10*3/MM3 (ref 0–0.2)
BASOPHILS NFR BLD AUTO: 0.5 % (ref 0–1.5)
BILIRUB SERPL-MCNC: 0.2 MG/DL (ref 0–1.2)
BUN SERPL-MCNC: 20 MG/DL (ref 8–23)
BUN/CREAT SERPL: 6.9 (ref 7–25)
CALCIUM SPEC-SCNC: 8.9 MG/DL (ref 8.6–10.5)
CHLORIDE SERPL-SCNC: 98 MMOL/L (ref 98–107)
CO2 SERPL-SCNC: 36 MMOL/L (ref 22–29)
CREAT SERPL-MCNC: 2.89 MG/DL (ref 0.57–1)
DEPRECATED RDW RBC AUTO: 48.7 FL (ref 37–54)
EGFRCR SERPLBLD CKD-EPI 2021: 15.3 ML/MIN/1.73
EOSINOPHIL # BLD AUTO: 0.16 10*3/MM3 (ref 0–0.4)
EOSINOPHIL NFR BLD AUTO: 2.5 % (ref 0.3–6.2)
ERYTHROCYTE [DISTWIDTH] IN BLOOD BY AUTOMATED COUNT: 13.5 % (ref 12.3–15.4)
FOLATE SERPL-MCNC: 7.08 NG/ML (ref 4.78–24.2)
GLOBULIN UR ELPH-MCNC: 2.5 GM/DL
GLUCOSE SERPL-MCNC: 91 MG/DL (ref 65–99)
HBA1C MFR BLD: 5.2 % (ref 4.8–5.6)
HCT VFR BLD AUTO: 31.5 % (ref 34–46.6)
HGB BLD-MCNC: 9.7 G/DL (ref 12–15.9)
IMM GRANULOCYTES # BLD AUTO: 0.02 10*3/MM3 (ref 0–0.05)
IMM GRANULOCYTES NFR BLD AUTO: 0.3 % (ref 0–0.5)
LYMPHOCYTES # BLD AUTO: 0.77 10*3/MM3 (ref 0.7–3.1)
LYMPHOCYTES NFR BLD AUTO: 12 % (ref 19.6–45.3)
MAGNESIUM SERPL-MCNC: 2.3 MG/DL (ref 1.6–2.4)
MCH RBC QN AUTO: 30.6 PG (ref 26.6–33)
MCHC RBC AUTO-ENTMCNC: 30.8 G/DL (ref 31.5–35.7)
MCV RBC AUTO: 99.4 FL (ref 79–97)
MONOCYTES # BLD AUTO: 0.85 10*3/MM3 (ref 0.1–0.9)
MONOCYTES NFR BLD AUTO: 13.2 % (ref 5–12)
NEUTROPHILS NFR BLD AUTO: 4.61 10*3/MM3 (ref 1.7–7)
NEUTROPHILS NFR BLD AUTO: 71.5 % (ref 42.7–76)
NRBC BLD AUTO-RTO: 0 /100 WBC (ref 0–0.2)
NT-PROBNP SERPL-MCNC: ABNORMAL PG/ML (ref 0–1800)
PLATELET # BLD AUTO: 153 10*3/MM3 (ref 140–450)
PMV BLD AUTO: 10.4 FL (ref 6–12)
POTASSIUM SERPL-SCNC: 4.4 MMOL/L (ref 3.5–5.2)
PROT SERPL-MCNC: 6 G/DL (ref 6–8.5)
RBC # BLD AUTO: 3.17 10*6/MM3 (ref 3.77–5.28)
SODIUM SERPL-SCNC: 142 MMOL/L (ref 136–145)
T4 FREE SERPL-MCNC: 1.05 NG/DL (ref 0.92–1.68)
TSH SERPL DL<=0.05 MIU/L-ACNC: 1.8 UIU/ML (ref 0.27–4.2)
WBC NRBC COR # BLD AUTO: 6.44 10*3/MM3 (ref 3.4–10.8)

## 2024-10-31 PROCEDURE — 83880 ASSAY OF NATRIURETIC PEPTIDE: CPT

## 2024-10-31 PROCEDURE — 84439 ASSAY OF FREE THYROXINE: CPT

## 2024-10-31 PROCEDURE — 36415 COLL VENOUS BLD VENIPUNCTURE: CPT

## 2024-10-31 PROCEDURE — 82746 ASSAY OF FOLIC ACID SERUM: CPT

## 2024-10-31 PROCEDURE — 83036 HEMOGLOBIN GLYCOSYLATED A1C: CPT

## 2024-10-31 PROCEDURE — 83735 ASSAY OF MAGNESIUM: CPT

## 2024-10-31 PROCEDURE — 85025 COMPLETE CBC W/AUTO DIFF WBC: CPT

## 2024-10-31 PROCEDURE — 84443 ASSAY THYROID STIM HORMONE: CPT

## 2024-10-31 PROCEDURE — 80053 COMPREHEN METABOLIC PANEL: CPT

## 2024-11-05 ENCOUNTER — OFFICE VISIT (OUTPATIENT)
Dept: CARDIOLOGY | Facility: CLINIC | Age: 89
End: 2024-11-05
Payer: MEDICARE

## 2024-11-05 VITALS
BODY MASS INDEX: 23.56 KG/M2 | HEART RATE: 75 BPM | HEIGHT: 64 IN | SYSTOLIC BLOOD PRESSURE: 138 MMHG | WEIGHT: 138 LBS | DIASTOLIC BLOOD PRESSURE: 47 MMHG

## 2024-11-05 DIAGNOSIS — E78.5 HYPERLIPIDEMIA LDL GOAL <70: ICD-10-CM

## 2024-11-05 DIAGNOSIS — I10 ESSENTIAL HYPERTENSION: ICD-10-CM

## 2024-11-05 DIAGNOSIS — I50.32 CHRONIC HEART FAILURE WITH PRESERVED EJECTION FRACTION: Primary | ICD-10-CM

## 2024-11-05 DIAGNOSIS — N18.6 END STAGE RENAL DISEASE ON DIALYSIS: ICD-10-CM

## 2024-11-05 DIAGNOSIS — Z99.2 END STAGE RENAL DISEASE ON DIALYSIS: ICD-10-CM

## 2024-11-05 RX ORDER — HYDRALAZINE HYDROCHLORIDE 25 MG/1
25 TABLET, FILM COATED ORAL 2 TIMES DAILY
Qty: 180 TABLET | Refills: 3 | Status: SHIPPED | OUTPATIENT
Start: 2024-11-05

## 2024-11-05 NOTE — ASSESSMENT & PLAN NOTE
Patient has stage IV renal disease.  She does dialysis Monday Wednesday Friday.  They have increased her chair time from 3-1/2 hours to 4 hours due to fluid volume.  Will continue to monitor her labs.

## 2024-11-05 NOTE — ASSESSMENT & PLAN NOTE
Patient has heart failure with preserved ejection fraction who appears to be doing well.  Short of breath and pedal edema are stable.  They have increased her chair time at dialysis to 4 hours to help with the fluid retention.  He is tolerating the losartan isosorbide and spironolactone at the current doses.  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  Will make the following changes to her heart failure medications:    1.  Hydralazine 25 mg take 1 tablet twice daily.  2.  Only take amlodipine if systolic blood pressure is greater then 150 two hours after taking scheduled medications.  3.  Continue to do a heart rate blood pressure and weight log and call if systolic blood pressure is less than 100.  4.  Do blood work 1 to 2 days prior to next visit.

## 2024-11-05 NOTE — ASSESSMENT & PLAN NOTE
Patient's blood pressure tends to be elevated per her home log.  Trialed her on amlodipine of which she said her pressures are better.  Will trial hydralazine 25 mg twice daily in addition to her current medications and only utilize the amlodipine if systolic stays greater than 150 two hours after scheduled medications.  Ask her to do a heart rate blood pressure and weight log and bring it to her next appointment.

## 2024-11-05 NOTE — PATIENT INSTRUCTIONS
1.  Hydralazine 25 mg take 1 tablet twice daily.  2.  Only take amlodipine if systolic blood pressure is greater then 150 two hours after taking scheduled medications.  3.  Continue to do a heart rate blood pressure and weight log and call if systolic blood pressure is less than 100.  4.  Do blood work 1 to 2 days prior to next visit.

## 2024-11-05 NOTE — PROGRESS NOTES
Office Visit    Chief Complaint  Chronic heart failure with preserved ejection fraction    Subjective            Charlettetenzin Rai presents to [facility] CARDIOLOGY  History of Present Illness  Ms. Rai is an 89-year-old female who presented to the office for follow-up due to heart failure with preserved ejection fraction, hypertension, hyperlipidemia, end-stage renal disease on dialysis Monday Wednesday Friday.  Patient reports that she is doing well today although she does experience some abdominal bloating.  They have increased her dialysis from 3-1/2 hours to 4 hours to help with the fluid volume.  Per her home log systolic blood pressure generally runs around 140 or better.  She does utilize the amlodipine on a regular basis.  Will try hydralazine twice daily.  She denies any chest pain, orthopnea or syncopal episodes.      Past Medical History:   Diagnosis Date    Allergic rhinitis     Anaphylactic shock, unspecified, initial encounter 12/05/2023    Anemia     Arthritis     Asthma     USE INHALERS AND NEBULIZERS    Back pain     Bladder disorder     Cancer     LEFT LUNG CANCER 2005 SURGERY AND CHEMO DONE  AND CURRENTLY 4/ 14/22  RIGHT LUNG CANCER HAS ONLY RECEIVED RADIATION THUS FAR    Chronic kidney disease     stage 3    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 DENIES CP BUT DOES HAVE SOA CHRONIC ISSUE COPD/LUNG CANCER    COPD (chronic obstructive pulmonary disease)     FOLLOWED BY DR MARIAJOSE BAILEY    Coronary artery disease     DENIES CP BUT DOES GET SOA MOST OF THE TIME WITH EXERTION BUT OCC AT REST CHRONIC ISSUE COPD/LUNG CANCER    Deep vein thrombosis     Diabetes mellitus     DOES NOT CHECK BS DAILY    Disease of thyroid gland     HYPOTHYROIDISM    Essential hypertension     Gastric ulcer     GERD (gastroesophageal reflux  disease)     Heart murmur     History of transfusion     NO ISSUES POST TRANSFUSION WAS MANY YEARS AGO    Hyperlipemia     Leukocytopenia     FOLLOWED BY DR MIR BAILEY    Limb swelling     Lumbago     Lumbar spinal stenosis     Lung cancer     Migraine headache     Multiple joint pain     On home oxygen therapy     3L/NC PRN    Osteopenia     PNA (pneumonia) 05/04/2024    Pseudomonas pneumonia 04/29/2024    Reflux esophagitis     Shortness of breath     Thyroid nodule     HAS ULTRASOUND YEARLY BEING MONITORED    Vascular disease     Vitamin D deficiency        No Known Allergies     Past Surgical History:   Procedure Laterality Date    ABDOMINAL SURGERY      ANGIOPLASTY RENAL ARTERY Left 06/14/2024    stent placement    APPENDECTOMY      ARTERIOGRAM N/A 6/14/2024    Procedure: Arteriogram, right radial access, aortogram and renal arteriogram with possible intervention.;  Surgeon: Dio Miguel MD;  Location: [facility] CATH INVASIVE LOCATION;  Service: Peripheral Vascular;  Laterality: N/A;    ARTERIOVENOUS FISTULA/SHUNT SURGERY Left 05/10/2022    Procedure: Left basilic vein transposition;  Surgeon: Wan Barksdale MD;  Location: [facility] MAIN OR;  Service: Vascular;  Laterality: Left;    ARTERIOVENOUS FISTULA/SHUNT SURGERY Left 03/23/2023    Procedure: Ligation of left arm arteriovenous fistula;  Surgeon: Wan Barksdale MD;  Location: [facility] MAIN OR;  Service: Vascular;  Laterality: Left;    ARTERIOVENOUS FISTULA/SHUNT SURGERY Left 11/30/2023    Procedure: Creation of left arm arteriovenous graft;  Surgeon: Wan Barksdale MD;  Location: [facility] MAIN OR;  Service: Vascular;  Laterality: Left;    BRONCHOSCOPY N/A 04/24/2024    Procedure: BRONCHOSCOPY WITH BAL AND WASHINGS;  Surgeon: Khalif Yanez MD;  Location: [facility] ENDOSCOPY;  Service: Pulmonary;  Laterality: N/A;  MUCUS PLUGGING    CARDIAC CATHETERIZATION  1996    CARDIAC SURGERY      CARDIAC SURGERY      fluid drained from heart    CATARACT EXTRACTION, BILATERAL   2003    COLONOSCOPY  2014    ENDOSCOPY  2016    2019    FEMORAL ARTERY STENT Bilateral     HYSTERECTOMY      LUNG BIOPSY Left 2005    lobectomy upper lung caner    LUNG VOLUME REDUCTION      OTHER SURGICAL HISTORY      artifical joints/limbs    REPLACEMENT TOTAL KNEE Left 2016    UPPER GASTROINTESTINAL ENDOSCOPY          Social History     Tobacco Use    Smoking status: Former     Current packs/day: 0.00     Average packs/day: 1 pack/day for 52.5 years (52.5 ttl pk-yrs)     Types: Cigarettes     Start date:      Quit date: 2004     Years since quittin.3     Passive exposure: Past    Smokeless tobacco: Never   Vaping Use    Vaping status: Never Used   Substance Use Topics    Alcohol use: Never    Drug use: Never       Family History   Problem Relation Age of Onset    Arthritis Mother     Arthritis Father     Cancer Brother     Diabetes Brother     Other Brother         blood disease     Prostate cancer Brother     Cancer Brother     Prostate cancer Brother     Cancer Brother     Cancer Brother     Malig Hyperthermia Neg Hx     Colon cancer Neg Hx         Prior to Admission medications    Medication Sig Start Date End Date Taking? Authorizing Provider   acetaminophen (TYLENOL) 325 MG tablet Take 2 tablets by mouth Every 6 (Six) Hours As Needed for Mild Pain.   Yes Provider, MD Sarah   albuterol sulfate  (90 Base) MCG/ACT inhaler Inhale 2 puffs Every 4 (Four) Hours As Needed for Wheezing. 24  Yes Laura Franco APRN   amLODIPine (NORVASC) 2.5 MG tablet Take 1 tablet by mouth As Needed (Once daily for a systolic blood pressure greater than 150). For a systolic blood pressure greater than 150 24  Yes Halima Quick APRN   aspirin 81 MG EC tablet Take 1 tablet by mouth Daily.   Yes Provider, MD Sarah   azithromycin (ZITHROMAX) 500 MG tablet Take 1 tablet by mouth Daily. 10/1/24  Yes Jeff Blood, PA-C   budesonide (Pulmicort) 0.5 MG/2ML nebulizer solution Take 2 mL by  nebulization 2 (Two) Times a Day. 8/28/24  Yes Brennan Mosqueda MD   carvedilol (COREG) 25 MG tablet Take 1 tablet by mouth 2 (Two) Times a Day With Meals. 9/24/24  Yes Halima Quick APRN   clopidogrel (Plavix) 75 MG tablet Take 1 tablet by mouth Daily. 6/15/24 6/15/25 Yes Dio Miguel MD   dexlansoprazole (DEXILANT) 60 MG capsule Take 1 capsule by mouth Daily. 8/5/24  Yes Brennan Mosqueda MD   fluticasone (FLONASE) 50 MCG/ACT nasal spray  9/12/24  Yes ProviderSarah MD   furosemide (LASIX) 20 MG tablet Take 1 tablet by mouth Daily. 6/18/24  Yes Halima Quick APRN   ipratropium-albuterol (DUO-NEB) 0.5-2.5 mg/3 ml nebulizer Take 3 mL by nebulization 4 (Four) Times a Day for 30 days. 8/28/24 11/5/24 Yes Brennan Mosqueda MD   isosorbide mononitrate (IMDUR) 30 MG 24 hr tablet Take 1 tablet by mouth Every Night. 9/17/24  Yes Jazzy Addison APRN   levocetirizine (XYZAL) 5 MG tablet TAKE 1 TABLET DAILY  Patient taking differently: Take 1 tablet by mouth Every Evening. 6/23/23  Yes Paloma James MD   levothyroxine (Synthroid) 50 MCG tablet Take 0.5 tablets by mouth Daily. 7/2/24  Yes Maria Teresa Malhotra MD   levothyroxine (SYNTHROID, LEVOTHROID) 25 MCG tablet Take 1 tablet by mouth Daily. 7/8/24  Yes Sarah Parker MD   lidocaine-prilocaine (EMLA) 2.5-2.5 % cream apply to dialysis access site 30-45 minutes prior to dialysis 9/13/24  Yes Sarah Parker MD   linagliptin (Tradjenta) 5 MG tablet tablet Take 1 tablet by mouth Daily. 1/10/22  Yes Paloma James MD   losartan (Cozaar) 50 MG tablet Take 1 tablet by mouth 2 (Two) Times a Day. 7/16/24  Yes Brennan Mosqueda MD   Methoxy PEG-Epoetin Beta (MIRCERA IJ) 60 mcg Every 14 (Fourteen) Days. 9/2/24 9/1/25 Yes Provider, MD Sarah   nitroglycerin (NITROSTAT) 0.4 MG SL tablet Place 1 tablet under the tongue Every 5 (Five) Minutes As Needed for Chest Pain.   Yes Provider, MD Sarah  "  ondansetron (ZOFRAN) 4 MG tablet Take 1 tablet by mouth Every 8 (Eight) Hours As Needed for Nausea or Vomiting.   Yes ProviderSarah MD   predniSONE (DELTASONE) 20 MG tablet Take 2 p.o. daily for 5 days. 9/15/24  Yes Jose Manuel Saunders DO   predniSONE (DELTASONE) 20 MG tablet Take 1 tablet by mouth 2 (Two) Times a Day. 10/1/24  Yes Jeff Blood PA-C   rosuvastatin (CRESTOR) 20 MG tablet Take 1 tablet by mouth Every Night. 9/17/24  Yes Jazzy Addison APRN   sevelamer (RENVELA) 800 MG tablet Take 1 tablet by mouth 3 (Three) Times a Day With Meals. 10/9/23  Yes ProviderSarah MD   spironolactone (ALDACTONE) 25 MG tablet Take 1 tablet by mouth Every Other Day. 7/2/24  Yes Maria Teresa Malhotra MD   tobramycin PF (JUAN) 300 MG/5ML nebulizer solution Take 5 mL by nebulization 2 (Two) Times a Day. 30 days on 30 days off 9/12/24  Yes Laura Franco APRN   vitamin D (ERGOCALCIFEROL) 1.25 MG (91687 UT) capsule capsule Take 1 capsule by mouth Every 14 (Fourteen) Days. 10/24/24  Yes Brennan Mosqueda MD        Review of Systems   Constitutional:  Positive for fatigue.   Respiratory:  Positive for cough and shortness of breath.    Cardiovascular:  Positive for leg swelling. Negative for chest pain and palpitations.   Neurological:  Positive for dizziness.        Symptom Course: Been Unchanged    Weight Trend: Stable     Objective     /47   Pulse 75   Ht 162.6 cm (64\")   Wt 62.6 kg (138 lb)   BMI 23.69 kg/m²       Physical Exam  Constitutional:       General: She is awake.      Appearance: Normal appearance.   Neck:      Thyroid: No thyromegaly.      Vascular: No carotid bruit or JVD.   Cardiovascular:      Rate and Rhythm: Normal rate and regular rhythm.      Chest Wall: PMI is not displaced.      Pulses: Normal pulses.      Heart sounds: Normal heart sounds, S1 normal and S2 normal. No murmur heard.     No friction rub. No gallop. No S3 or S4 sounds.   Pulmonary:      Effort: Pulmonary " effort is normal.      Breath sounds: Normal breath sounds and air entry. No wheezing, rhonchi or rales.   Abdominal:      General: Bowel sounds are normal.      Palpations: Abdomen is soft. There is no mass.      Tenderness: There is no abdominal tenderness.   Musculoskeletal:      Cervical back: Neck supple.      Right lower leg: No edema.      Left lower leg: No edema.   Neurological:      Mental Status: She is alert and oriented to person, place, and time.   Psychiatric:         Mood and Affect: Mood normal.         Behavior: Behavior is cooperative.           Result Review :                      Lab Results   Component Value Date    PROBNP 15,166.0 (H) 10/31/2024    PROBNP 17,913.0 (H) 09/30/2024    PROBNP 24,196.0 (H) 09/15/2024     09/09/2019     04/11/2019     CMP          9/15/2024    19:59 9/30/2024    23:11 10/31/2024    11:00   CMP   Glucose 138  140  91    BUN 39  21  20    Creatinine 3.84  2.49  2.89    EGFR 10.9  18.3  15.3    Sodium 141  143  142    Potassium 5.2  4.5  4.4    Chloride 99  100  98    Calcium 8.4  8.4  8.9    Total Protein 6.2  5.8  6.0    Albumin 3.2  3.3  3.5    Globulin 3.0  2.5  2.5    Total Bilirubin 0.3  0.3  0.2    Alkaline Phosphatase 204  234  277    AST (SGOT) 22  19  25    ALT (SGPT) 13  13  16    Albumin/Globulin Ratio 1.1  1.3  1.4    BUN/Creatinine Ratio 10.2  8.4  6.9    Anion Gap 9.6  6.2  8.0      CBC w/diff          10/23/2024    00:00 10/30/2024    00:00 10/31/2024    11:00   CBC w/Diff   WBC   6.44    RBC   3.17    Hemoglobin 9.8        29.4     9.4        28.2     9.7    Hematocrit   31.5    MCV   99.4    MCH   30.6    MCHC   30.8    RDW   13.5    Platelets   153    Neutrophil Rel %   71.5    Immature Granulocyte Rel %   0.3    Lymphocyte Rel %   12.0    Monocyte Rel %   13.2    Eosinophil Rel %   2.5    Basophil Rel %   0.5       Details          This result is from an external source.              Lipid Panel          6/27/2024    09:46 8/6/2024     "09:26   Lipid Panel   Total Cholesterol 126  94    Triglycerides 55  69    HDL Cholesterol 88  54    VLDL Cholesterol 12  15    LDL Cholesterol  26  25    LDL/HDL Ratio 0.31  0.49       Lab Results   Component Value Date    TSH 1.800 10/31/2024    TSH 0.724 08/06/2024    TSH 7.250 (H) 06/27/2024      Lab Results   Component Value Date    FREET4 1.05 10/31/2024    FREET4 1.40 08/06/2024    FREET4 1.28 06/27/2024      D-Dimer, Quantitative   Date Value Ref Range Status   03/13/2024 4.30 (H) 0.00 - 0.87 MCGFEU/mL Final     Magnesium   Date Value Ref Range Status   10/31/2024 2.3 1.6 - 2.4 mg/dL Final      No results found for: \"DIGOXIN\"   A1C Last 3 Results          7/17/2024    00:00 10/16/2024    00:00 10/31/2024    11:00   HGBA1C Last 3 Results   Hemoglobin A1C 5.4     4.8     5.20       Details          This result is from an external source.                  Results for orders placed during the hospital encounter of 08/21/24    Adult Transthoracic Echo Limited W/ Cont if Necessary Per Protocol    Interpretation Summary    Left ventricular systolic function is low normal. Left ventricular ejection fraction appears to be 51 - 55%.    Left ventricular wall thickness is consistent with mild concentric hypertrophy.    The left atrial cavity is mild to moderately dilated.    Aortic valve is moderately calcified.  Mild to moderate aortic valve stenosis is present.  The peak and mean gradient across aortic valve are 21/15 mmHg with a calculated valve area of 1.64 cm².    Mitral valve apparatus is calcified with restricted opening.  Possible mitral stenosis, unable to assess severity.  Adequate Doppler studies not obtained across the mitral valve.        Assessment and Plan        Diagnoses and all orders for this visit:    1. Chronic heart failure with preserved ejection fraction (Primary)  Assessment & Plan:  Patient has heart failure with preserved ejection fraction who appears to be doing well.  Short of breath and " pedal edema are stable.  They have increased her chair time at dialysis to 4 hours to help with the fluid retention.  He is tolerating the losartan isosorbide and spironolactone at the current doses.  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  Will make the following changes to her heart failure medications:    1.  Hydralazine 25 mg take 1 tablet twice daily.  2.  Only take amlodipine if systolic blood pressure is greater then 150 two hours after taking scheduled medications.  3.  Continue to do a heart rate blood pressure and weight log and call if systolic blood pressure is less than 100.  4.  Do blood work 1 to 2 days prior to next visit.    Orders:  -     CBC & Differential; Future  -     Comprehensive Metabolic Panel; Future  -     proBNP; Future  -     Magnesium; Future    2. Essential hypertension  Assessment & Plan:  Patient's blood pressure tends to be elevated per her home log.  Trialed her on amlodipine of which she said her pressures are better.  Will trial hydralazine 25 mg twice daily in addition to her current medications and only utilize the amlodipine if systolic stays greater than 150 two hours after scheduled medications.  Ask her to do a heart rate blood pressure and weight log and bring it to her next appointment.      3. Hyperlipidemia LDL goal <70  Assessment & Plan:  Patient utilizes Crestor 20 mg daily.  Will continue the same.      4. End stage renal disease on dialysis  Assessment & Plan:  Patient has stage IV renal disease.  She does dialysis Monday Wednesday Friday.  They have increased her chair time from 3-1/2 hours to 4 hours due to fluid volume.  Will continue to monitor her labs.    Orders:  -     CBC & Differential; Future  -     Comprehensive Metabolic Panel; Future  -     proBNP; Future  -     Magnesium; Future    Other orders  -     hydrALAZINE (APRESOLINE) 25 MG tablet; Take 1 tablet by mouth 2 (Two) Times a Day.  Dispense: 180 tablet;  Refill: 3            Follow Up     Return in about 2 months (around 1/5/2025).    Patient was given instructions and counseling regarding her condition or for health maintenance advice. Please see specific information pulled into the AVS if appropriate.     Electronically signed by DAT Dexter, 11/05/24, 12:54 PM EST.

## 2024-11-06 DIAGNOSIS — N28.89 RENAL MASS: ICD-10-CM

## 2024-11-06 DIAGNOSIS — S37.011A HEMATOMA OF RIGHT KIDNEY, INITIAL ENCOUNTER: Primary | ICD-10-CM

## 2024-11-12 ENCOUNTER — TELEPHONE (OUTPATIENT)
Dept: UROLOGY | Age: 89
End: 2024-11-12
Payer: MEDICARE

## 2024-11-12 NOTE — TELEPHONE ENCOUNTER
LM FOR PT THAT DR JURADO SAID THAT IT IS OK FOR HER TO GET A CONTRASTED SCAN BECAUSE SHE IS ON DIALYSIS. ADVISED THAT I SENT THE REFERRAL BACK TO SCHEDULING TO GET THE SCAN SCHEDULED. ASKED FOR A CALL BACK WITH ANY SPECIFIC QUESTIONS.

## 2024-11-15 ENCOUNTER — TELEPHONE (OUTPATIENT)
Dept: UROLOGY | Age: 89
End: 2024-11-15
Payer: MEDICARE

## 2024-11-15 NOTE — TELEPHONE ENCOUNTER
LM FOR PT THAT CT SCAN WILL BE 11/26 AT 11AM AT [facility], ARRIVE 1045AM. ASKED FOR A CALL BACK WITH QUESTIONS. ADVISED NPO 2 HRS PRIOR.

## 2024-11-15 NOTE — TELEPHONE ENCOUNTER
SPOKE TO PT WHO IS AGREEABLE TO SCHEDULING CONTRASTED SCAN. WILL GET THIS ARRANGED AND CALL PT BACK WITH APPT INFO. PT EXPRESSED UNDERSTANDING AND IS AGREEABLE.

## 2024-11-15 NOTE — TELEPHONE ENCOUNTER
LM AGAIN FOR PT THAT IT IS OK FOR HER TO GET A CONTRASTED SCAN, PER DR JURADO. ASKED PT TO CALL ME BACK SO THAT WE CAN GET THIS SCHEDULED.

## 2024-11-19 ENCOUNTER — OFFICE VISIT (OUTPATIENT)
Dept: INTERNAL MEDICINE | Age: 89
End: 2024-11-19
Payer: MEDICARE

## 2024-11-19 VITALS
HEIGHT: 64 IN | OXYGEN SATURATION: 88 % | WEIGHT: 141 LBS | TEMPERATURE: 98.2 F | SYSTOLIC BLOOD PRESSURE: 155 MMHG | HEART RATE: 87 BPM | BODY MASS INDEX: 24.07 KG/M2 | DIASTOLIC BLOOD PRESSURE: 84 MMHG

## 2024-11-19 DIAGNOSIS — I35.0 AORTIC STENOSIS, MODERATE: ICD-10-CM

## 2024-11-19 DIAGNOSIS — N18.6 END STAGE RENAL DISEASE ON DIALYSIS: ICD-10-CM

## 2024-11-19 DIAGNOSIS — K21.9 GASTROESOPHAGEAL REFLUX DISEASE WITHOUT ESOPHAGITIS: ICD-10-CM

## 2024-11-19 DIAGNOSIS — Z99.81 OXYGEN DEPENDENT: ICD-10-CM

## 2024-11-19 DIAGNOSIS — Z98.62 S/P RENAL ARTERY ANGIOPLASTY: ICD-10-CM

## 2024-11-19 DIAGNOSIS — E83.39 HYPERPHOSPHATEMIA: ICD-10-CM

## 2024-11-19 DIAGNOSIS — J96.22 ACUTE ON CHRONIC RESPIRATORY FAILURE WITH HYPOXIA AND HYPERCAPNIA: Primary | ICD-10-CM

## 2024-11-19 DIAGNOSIS — J96.21 ACUTE ON CHRONIC RESPIRATORY FAILURE WITH HYPOXIA AND HYPERCAPNIA: Primary | ICD-10-CM

## 2024-11-19 DIAGNOSIS — Z99.2 END STAGE RENAL DISEASE ON DIALYSIS: ICD-10-CM

## 2024-11-19 DIAGNOSIS — N18.6 ESRD (END STAGE RENAL DISEASE) ON DIALYSIS: ICD-10-CM

## 2024-11-19 DIAGNOSIS — E55.9 VITAMIN D DEFICIENCY: ICD-10-CM

## 2024-11-19 DIAGNOSIS — I50.32 CHRONIC HEART FAILURE WITH PRESERVED EJECTION FRACTION: ICD-10-CM

## 2024-11-19 DIAGNOSIS — I10 ESSENTIAL HYPERTENSION: ICD-10-CM

## 2024-11-19 DIAGNOSIS — Z99.2 ESRD (END STAGE RENAL DISEASE) ON DIALYSIS: ICD-10-CM

## 2024-11-19 DIAGNOSIS — R73.01 IFG (IMPAIRED FASTING GLUCOSE): ICD-10-CM

## 2024-11-19 DIAGNOSIS — E03.9 HYPOTHYROIDISM (ACQUIRED): ICD-10-CM

## 2024-11-19 DIAGNOSIS — R54 FRAILTY SYNDROME IN GERIATRIC PATIENT: ICD-10-CM

## 2024-11-19 PROCEDURE — G2211 COMPLEX E/M VISIT ADD ON: HCPCS | Performed by: INTERNAL MEDICINE

## 2024-11-19 PROCEDURE — 99214 OFFICE O/P EST MOD 30 MIN: CPT | Performed by: INTERNAL MEDICINE

## 2024-11-19 PROCEDURE — 1125F AMNT PAIN NOTED PAIN PRSNT: CPT | Performed by: INTERNAL MEDICINE

## 2024-11-19 RX ORDER — SEVELAMER CARBONATE 800 MG/1
800 TABLET, FILM COATED ORAL
Qty: 270 TABLET | Refills: 3 | Status: SHIPPED | OUTPATIENT
Start: 2024-11-19

## 2024-11-19 NOTE — PROGRESS NOTES
"CHIEF COMPLAINT/ HPI:  Hypertension (Routine follow up, Lab follow up.)              Objective   Vital Signs  Vitals:    11/19/24 1235   BP: 155/84   BP Location: Right arm   Patient Position: Sitting   Pulse: 87   Temp: 98.2 °F (36.8 °C)   SpO2: (!) 88%   Weight: 64 kg (141 lb)   Height: 162.6 cm (64.02\")      Body mass index is 24.19 kg/m².  Review of Systems patient is generally weak but gets around fairly well, she still has swelling in her legs she says she uses a rollator walker wears her oxygen on a regular basis,  Physical Exam her lungs are clear posterior to the bases cardiac exam regular rhythm with a 2/6 to 3/6 systolic murmur left upper right upper sternal border she has 1+ edema in the lower PreTAB regions bilateral left greater than right she is alert and oriented x 3 using a rollator walker abdomen soft neck supple throat shows dry mucosa sclera nonicteric she is pleasant talkative  Result Review :   Lab Results   Component Value Date    PROBNP 15,166.0 (H) 10/31/2024    PROBNP 17,913.0 (H) 09/30/2024    PROBNP 24,196.0 (H) 09/15/2024     09/09/2019     04/11/2019     CMP          9/15/2024    19:59 9/30/2024    23:11 10/31/2024    11:00   CMP   Glucose 138  140  91    BUN 39  21  20    Creatinine 3.84  2.49  2.89    EGFR 10.9  18.3  15.3    Sodium 141  143  142    Potassium 5.2  4.5  4.4    Chloride 99  100  98    Calcium 8.4  8.4  8.9    Total Protein 6.2  5.8  6.0    Albumin 3.2  3.3  3.5    Globulin 3.0  2.5  2.5    Total Bilirubin 0.3  0.3  0.2    Alkaline Phosphatase 204  234  277    AST (SGOT) 22  19  25    ALT (SGPT) 13  13  16    Albumin/Globulin Ratio 1.1  1.3  1.4    BUN/Creatinine Ratio 10.2  8.4  6.9    Anion Gap 9.6  6.2  8.0      CBC w/diff          10/30/2024    00:00 10/31/2024    11:00 11/6/2024    00:00   CBC w/Diff   WBC  6.44     RBC  3.17     Hemoglobin 9.4        28.2     9.7  9.5        28.5       Hematocrit  31.5     MCV  99.4     MCH  30.6     MCHC  30.8   "   RDW  13.5     Platelets  153     Neutrophil Rel %  71.5     Immature Granulocyte Rel %  0.3     Lymphocyte Rel %  12.0     Monocyte Rel %  13.2     Eosinophil Rel %  2.5     Basophil Rel %  0.5        Details          This result is from an external source.              Lipid Panel          6/27/2024    09:46 8/6/2024    09:26   Lipid Panel   Total Cholesterol 126  94    Triglycerides 55  69    HDL Cholesterol 88  54    VLDL Cholesterol 12  15    LDL Cholesterol  26  25    LDL/HDL Ratio 0.31  0.49       Lab Results   Component Value Date    TSH 1.800 10/31/2024    TSH 0.724 08/06/2024    TSH 7.250 (H) 06/27/2024      Lab Results   Component Value Date    FREET4 1.05 10/31/2024    FREET4 1.40 08/06/2024    FREET4 1.28 06/27/2024      A1C Last 3 Results          7/17/2024    00:00 10/16/2024    00:00 10/31/2024    11:00   HGBA1C Last 3 Results   Hemoglobin A1C 5.4     4.8     5.20       Details          This result is from an external source.                               Visit Diagnoses:    ICD-10-CM ICD-9-CM   1. Acute on chronic respiratory failure with hypoxia and hypercapnia  J96.21 518.84    J96.22 786.09     799.02   2. End stage renal disease on dialysis  N18.6 585.6    Z99.2 V45.11   3. Vitamin D deficiency  E55.9 268.9   4. Aortic stenosis, moderate  I35.0 424.1   5. Chronic heart failure with preserved ejection fraction  I50.32 428.9   6. Essential hypertension  I10 401.9   7. S/P renal artery angioplasty  Z98.62 V45.89   8. ESRD (end stage renal disease) on dialysis  N18.6 585.6    Z99.2 V45.11   9. Hyperphosphatemia  E83.39 275.3   10. Gastroesophageal reflux disease without esophagitis  K21.9 530.81   11. Hypothyroidism (acquired)  E03.9 244.9   12. Frailty syndrome in geriatric patient  R54 797   13. Oxygen dependent  Z99.81 V46.2   14. IFG (impaired fasting glucose)  R73.01 790.21       Assessment and Plan   Diagnoses and all orders for this visit:    1. Acute on chronic respiratory failure with  hypoxia and hypercapnia (Primary)  -     Comprehensive Metabolic Panel; Future  -     CBC & Differential; Future  -     Hemoglobin A1c; Future    2. End stage renal disease on dialysis  -     Comprehensive Metabolic Panel; Future  -     CBC & Differential; Future  -     Hemoglobin A1c; Future    3. Vitamin D deficiency  -     Comprehensive Metabolic Panel; Future  -     CBC & Differential; Future  -     Hemoglobin A1c; Future    4. Aortic stenosis, moderate  -     Comprehensive Metabolic Panel; Future  -     CBC & Differential; Future  -     Hemoglobin A1c; Future    5. Chronic heart failure with preserved ejection fraction  -     Comprehensive Metabolic Panel; Future  -     CBC & Differential; Future  -     Hemoglobin A1c; Future    6. Essential hypertension  -     Comprehensive Metabolic Panel; Future  -     CBC & Differential; Future  -     Hemoglobin A1c; Future    7. S/P renal artery angioplasty  -     Comprehensive Metabolic Panel; Future  -     CBC & Differential; Future  -     Hemoglobin A1c; Future    8. ESRD (end stage renal disease) on dialysis  -     Comprehensive Metabolic Panel; Future  -     CBC & Differential; Future  -     Hemoglobin A1c; Future    9. Hyperphosphatemia  -     Comprehensive Metabolic Panel; Future  -     CBC & Differential; Future  -     Hemoglobin A1c; Future    10. Gastroesophageal reflux disease without esophagitis  -     Comprehensive Metabolic Panel; Future  -     CBC & Differential; Future  -     Hemoglobin A1c; Future    11. Hypothyroidism (acquired)  -     Comprehensive Metabolic Panel; Future  -     CBC & Differential; Future  -     Hemoglobin A1c; Future    12. Frailty syndrome in geriatric patient  -     Comprehensive Metabolic Panel; Future  -     CBC & Differential; Future  -     Hemoglobin A1c; Future    13. Oxygen dependent  -     Comprehensive Metabolic Panel; Future  -     CBC & Differential; Future  -     Hemoglobin A1c; Future    14. IFG (impaired fasting  glucose)  -     Comprehensive Metabolic Panel; Future  -     CBC & Differential; Future  -     Hemoglobin A1c; Future    Other orders  -     sevelamer (RENVELA) 800 MG tablet; Take 1 tablet by mouth 3 (Three) Times a Day With Meals.  Dispense: 270 tablet; Refill: 3           Transitional care visit hospital admission August 21 to August 26, 2024 with multiple consultants and diagnosis of acute on chronic hypoxic and hypercapnic respiratory failure ,   pulmonary edema,    nonsustained V. tach with hypertension urgency emergency, continued on dialysis,============= hospital course, she came after having a tunneled dialysis catheter removal, and she was going to go to dialysis following her removal for a session on her new AV fistula, she became confused somnolent hypoxic after the procedure a CODE BLUE was called but she never lost a pulse, patient was intubated for airway protection she underwent dialysis with removal of 4 L overnight with improvement in oxygenation she was easily extubated the next day and she monitored for another 48 hours on dialysis, because of some nonsustained V. tach cardiology saw the patient but did not pursue any further treatment options, and they thought she might need an event monitor as an outpatient    Annual wellness visit completed July 16, 2024    Back pain, patient was asking for some pain medications discussed our role in neck care, can make referral to pain management, for possible narcotic usage on a long-term basis,    Generalized weakness,  uses her rollator walker to get around, goes to dialysis 3 days a week,    Frailty     End-stage renal disease ----on dialysis, tunneled dialysis catheter removed August 21 22nd 2024, now with AV fistula left upper extremity being used      Hypoxia continues oxygen,    Previous pneumonia, question about continuing her tobramycin nebulizer solutions?  July 16, 2024 currently off this medication    Diabetes, would stop Tradjenta given   hemoglobin A1c down to 5.2 October 31, 2024    Coronary artery disease, continues carvedilol 12.5 mg twice a day, aspirin 81 mg daily, Cozaar 50 mg daily, Imdur 30 mg daily--, hydralazine--does follow-up with [facility]    GERD, continues Dexilant 60 mg daily     COPD, continues Brovana nebs twice a day, DuoNebs 4 times a day, ProAir inhaler as needed, Pulmicort nebs twice a day, tobramycin nebulizer    Hypertension treated as above    Anemia ==chronic disease state will follow labs    PAD    Hyperlipidemia, continues rosuvastatin 20 mg daily    Lung cancer history--2005, chemo x 3 for right side, and partial lobectomy on the left side for recurrence of cancer,         Follow Up   Return in about 4 months (around 3/19/2025).  Patient was given instructions and counseling regarding her condition or for health maintenance advice. Please see specific information pulled into the AVS if appropriate.

## 2024-11-21 ENCOUNTER — TELEPHONE (OUTPATIENT)
Dept: INTERNAL MEDICINE | Age: 89
End: 2024-11-21
Payer: MEDICARE

## 2024-11-21 DIAGNOSIS — T14.90XA TRAUMA: Primary | ICD-10-CM

## 2024-11-21 NOTE — TELEPHONE ENCOUNTER
Spoke with pt's daughter. She stated her mother needed an Xray of the back of her left hand. Hand is swollen.    They are at [facility] in [place] and would like to get this done while they are there.

## 2024-11-26 ENCOUNTER — HOSPITAL ENCOUNTER (OUTPATIENT)
Dept: CT IMAGING | Facility: HOSPITAL | Age: 89
Discharge: HOME | End: 2024-11-26
Admitting: UROLOGY
Payer: MEDICARE

## 2024-11-26 DIAGNOSIS — S37.011A HEMATOMA OF RIGHT KIDNEY, INITIAL ENCOUNTER: ICD-10-CM

## 2024-11-26 DIAGNOSIS — N28.89 RENAL MASS: ICD-10-CM

## 2024-11-26 PROCEDURE — 74170 CT ABD WO CNTRST FLWD CNTRST: CPT

## 2024-11-26 PROCEDURE — 25510000001 IOPAMIDOL PER 1 ML: Performed by: UROLOGY

## 2024-11-26 RX ORDER — IOPAMIDOL 755 MG/ML
100 INJECTION, SOLUTION INTRAVASCULAR
Status: COMPLETED | OUTPATIENT
Start: 2024-11-26 | End: 2024-11-26

## 2024-11-26 RX ADMIN — IOPAMIDOL 100 ML: 755 INJECTION, SOLUTION INTRAVENOUS at 11:32

## 2024-12-02 ENCOUNTER — TELEPHONE (OUTPATIENT)
Dept: UROLOGY | Age: 89
End: 2024-12-02
Payer: MEDICARE

## 2024-12-02 NOTE — TELEPHONE ENCOUNTER
----- Message from Holley BAUMANN sent at 12/2/2024 10:44 AM EST -----  Regarding: NEEDS APPT PER RHIANNON  CAN YOU SEE IF SHE CAN COME IN TOMORROW AT 11 TO REVIEW HER IMAGING WITH DR JURADO? THANKS!  ----- Message -----  From: Laura Coleman MD  Sent: 12/2/2024  10:23 AM EST  To: Holley Cortez MA    Looks like needs appt to review; thanks  
CALLED PT TO SCHEDULE A F/U APPT WITH DR JURADO TOMORROW 12/3 AT 11:00 PER ESTEBAN, NO ANSWER, LMOM.  
Patients daughter called, she stated that she can not be seen tomorrow or Wednesday, Thursday would be good, either right before lunch or right after. Please send [employer] message with appointment time  
SCHEDULED FOR 12/12 @ 11:15, SENT [employer] MESSAGE AS REQUESTED WITH APPT DATE AND TIME.  
23

## 2024-12-09 PROBLEM — D50.9 IRON DEFICIENCY ANEMIA: Status: ACTIVE | Noted: 2024-11-25

## 2024-12-09 NOTE — PROGRESS NOTES
Primary Care Provider  Brennan Mosqueda MD   Referring Provider  No ref. provider found    Patient Complaint  Follow-up, Shortness of Breath (On exertion), and COPD      Subjective       History of Presenting Illness  Charlette Rai is a pleasant 89 y.o. female who presents to [facility] with history of GERD, end-stage renal disease, coronary artery disease, peripheral vascular disease, hypertension, heart failure with preserved ejection fraction, renal artery stenosis, hypothyroidism and COPD on 3 L of oxygen.  Patient here with her daughter.  She continues on dialysis Monday, Wednesdays and Fridays. She is being followed by Dr. García. She sees cardiology for heart failure with preserved ejection fraction, hypertension and hyperlipidemia. Patient does see Dr. Sanchez. 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 Patient did have lung cancer in 2005 and received 3 rounds of chemotherapy along with a right upper lobectomy. It is a former smoker and quit in 2004. She does have a 52.5-pack-year history. Patient does take Pulmicort twice a day and as needed albuterol at night.     Patient states she does get short of breath with exertional activities but improves with oxygen and rest.  She states she does have a cough at times and will hear some wheezing sometimes in the back of her throat.  At present time patient denies wheezing, headaches, chest pain, weight loss or hemoptysis. Patient denies fevers, chills and night sweats. Charlette Rai is able to perform ADLs.      I have personally reviewed the review of systems, past family, social, medical and surgical histories; and agree with their findings.      Review of Systems   Constitutional: Negative.    HENT: Negative.     Respiratory:  Positive for cough, shortness of breath and wheezing.    Cardiovascular: Negative.     Musculoskeletal: Negative.    Neurological: Negative.    Psychiatric/Behavioral: Negative.           Family History   Problem Relation Age of Onset    Arthritis Mother     Arthritis Father     Cancer Brother     Diabetes Brother     Other Brother         blood disease     Prostate cancer Brother     Cancer Brother     Prostate cancer Brother     Cancer Brother     Cancer Brother     Malig Hyperthermia Neg Hx     Colon cancer Neg Hx         Social History     Socioeconomic History    Marital status:    Tobacco Use    Smoking status: Former     Current packs/day: 0.00     Average packs/day: 1 pack/day for 52.5 years (52.5 ttl pk-yrs)     Types: Cigarettes     Start date:      Quit date: 2004     Years since quittin.4     Passive exposure: Past    Smokeless tobacco: Never   Vaping Use    Vaping status: Never Used   Substance and Sexual Activity    Alcohol use: Never    Drug use: Never    Sexual activity: Defer        Past Medical History:   Diagnosis Date    Allergic rhinitis     Anaphylactic shock, unspecified, initial encounter 2023    Anemia     Arthritis     Asthma     USE INHALERS AND NEBULIZERS    Back pain     Bladder disorder     Cancer     LEFT LUNG CANCER  SURGERY AND CHEMO DONE  AND CURRENTLY   RIGHT LUNG CANCER HAS ONLY RECEIVED RADIATION THUS FAR    Chronic kidney disease     stage 3    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 DENIES CP BUT DOES HAVE SOA CHRONIC ISSUE COPD/LUNG CANCER    COPD (chronic obstructive pulmonary disease)     FOLLOWED BY DR MARIAJOSE BAILEY    Coronary artery disease     DENIES CP BUT DOES GET SOA MOST OF THE TIME WITH EXERTION BUT OCC AT REST CHRONIC ISSUE COPD/LUNG CANCER    Deep vein thrombosis     Diabetes mellitus     DOES NOT CHECK BS DAILY    Disease of thyroid gland      HYPOTHYROIDISM    Essential hypertension     Gastric ulcer     GERD (gastroesophageal reflux disease)     Heart murmur     History of transfusion     NO ISSUES POST TRANSFUSION WAS MANY YEARS AGO    Hyperlipemia     Leukocytopenia     FOLLOWED BY DR MIR BAILEY    Limb swelling     Lumbago     Lumbar spinal stenosis     Lung cancer     Migraine headache     Multiple joint pain     On home oxygen therapy     3L/NC PRN    Osteopenia     PNA (pneumonia) 05/04/2024    Pseudomonas pneumonia 04/29/2024    Reflux esophagitis     Shortness of breath     Thyroid nodule     HAS ULTRASOUND YEARLY BEING MONITORED    Vascular disease     Vitamin D deficiency         Immunization History   Administered Date(s) Administered    ABRYSVO (RSV, 60+ or pregnant women 32-36 wks) 09/19/2024    COVID-19 (PFIZER) 12YRS+ (COMIRNATY) 10/19/2023, 10/10/2024    COVID-19 (PFIZER) BIVALENT 12+YRS 10/13/2022    COVID-19 (PFIZER) Purple Cap Monovalent 03/07/2021, 04/04/2021, 10/31/2021    FLUAD TRI 65YR+ 10/10/2024    Fluad Quad 65+ 10/19/2023    Fluzone (or Fluarix & Flulaval for VFC) >6mos 09/21/2016    Fluzone High-Dose 65+YRS 10/09/2018, 10/15/2020, 10/12/2021, 09/19/2022    Hepatitis B 2 Dose Vaccine Heplisav-B 08/28/2024, 09/23/2024, 10/23/2024    Influenza Seasonal Injectable 10/01/2017, 10/01/2018    Influenza, Unspecified 10/13/2021    Pneumococcal Conjugate 13-Valent (PCV13) 10/21/2015    Pneumococcal Conjugate 20-Valent (PCV20) 08/30/2024    Pneumococcal Polysaccharide (PPSV23) 10/30/2003, 11/08/2016       No Known Allergies       Current Outpatient Medications:     acetaminophen (TYLENOL) 325 MG tablet, Take 2 tablets by mouth Every 6 (Six) Hours As Needed for Mild Pain., Disp: , Rfl:     albuterol sulfate  (90 Base) MCG/ACT inhaler, Inhale 2 puffs Every 4 (Four) Hours As Needed for Wheezing., Disp: 18 g, Rfl: 11    amLODIPine (NORVASC) 2.5 MG tablet, Take 1 tablet by mouth As Needed (Once daily for a systolic blood pressure  greater than 150). For a systolic blood pressure greater than 150, Disp: 90 tablet, Rfl: 3    aspirin 81 MG EC tablet, Take 1 tablet by mouth Daily., Disp: , Rfl:     budesonide (Pulmicort) 0.5 MG/2ML nebulizer solution, Take 2 mL by nebulization 2 (Two) Times a Day., Disp: 60 each, Rfl: 5    carvedilol (COREG) 25 MG tablet, Take 1 tablet by mouth 2 (Two) Times a Day With Meals., Disp: 180 tablet, Rfl: 3    clopidogrel (Plavix) 75 MG tablet, Take 1 tablet by mouth Daily., Disp: 90 tablet, Rfl: 3    dexlansoprazole (DEXILANT) 60 MG capsule, Take 1 capsule by mouth Daily., Disp: 90 capsule, Rfl: 3    fluticasone (FLONASE) 50 MCG/ACT nasal spray, , Disp: , Rfl:     furosemide (LASIX) 20 MG tablet, Take 1 tablet by mouth Daily., Disp: 90 tablet, Rfl: 3    hydrALAZINE (APRESOLINE) 25 MG tablet, Take 1 tablet by mouth 2 (Two) Times a Day. (Patient taking differently: Take 1 tablet by mouth 2 (Two) Times a Day. Patient reports taking once a day), Disp: 180 tablet, Rfl: 3    isosorbide mononitrate (IMDUR) 30 MG 24 hr tablet, Take 1 tablet by mouth Every Night., Disp: 90 tablet, Rfl: 3    levocetirizine (XYZAL) 5 MG tablet, TAKE 1 TABLET DAILY (Patient taking differently: Take 1 tablet by mouth Every Evening.), Disp: 90 tablet, Rfl: 1    levothyroxine (SYNTHROID, LEVOTHROID) 25 MCG tablet, Take 1 tablet by mouth Daily., Disp: , Rfl:     lidocaine-prilocaine (EMLA) 2.5-2.5 % cream, apply to dialysis access site 30-45 minutes prior to dialysis, Disp: , Rfl:     linagliptin (Tradjenta) 5 MG tablet tablet, Take 1 tablet by mouth Daily., Disp: 90 tablet, Rfl: 1    losartan (Cozaar) 50 MG tablet, Take 1 tablet by mouth 2 (Two) Times a Day., Disp: 180 tablet, Rfl: 3    Methoxy PEG-Epoetin Beta (MIRCERA IJ), 60 mcg Every 14 (Fourteen) Days., Disp: , Rfl:     nitroglycerin (NITROSTAT) 0.4 MG SL tablet, Place 1 tablet under the tongue Every 5 (Five) Minutes As Needed for Chest Pain., Disp: , Rfl:     ofloxacin (OCUFLOX) 0.3 %  "ophthalmic solution, instill 4 DROPS into the affected ear(s) TWICE DAILY FOR 10 DAYS, Disp: , Rfl:     ondansetron (ZOFRAN) 4 MG tablet, Take 1 tablet by mouth Every 8 (Eight) Hours As Needed for Nausea or Vomiting., Disp: , Rfl:     rosuvastatin (CRESTOR) 20 MG tablet, Take 1 tablet by mouth Every Night., Disp: 90 tablet, Rfl: 3    sevelamer (RENVELA) 800 MG tablet, Take 1 tablet by mouth 3 (Three) Times a Day With Meals., Disp: 270 tablet, Rfl: 3    spironolactone (ALDACTONE) 25 MG tablet, Take 1 tablet by mouth Every Other Day., Disp: 45 tablet, Rfl: 3    tobramycin PF (JUAN) 300 MG/5ML nebulizer solution, Take 5 mL by nebulization 2 (Two) Times a Day. 30 days on 30 days off, Disp: 300 mL, Rfl: 6    vitamin D (ERGOCALCIFEROL) 1.25 MG (45927 UT) capsule capsule, Take 1 capsule by mouth Every 14 (Fourteen) Days., Disp: 6 capsule, Rfl: 3    ipratropium-albuterol (DUO-NEB) 0.5-2.5 mg/3 ml nebulizer, Take 3 mL by nebulization 4 (Four) Times a Day for 30 days., Disp: 360 mL, Rfl: 2         Vital Signs   /64 (BP Location: Right arm, Patient Position: Sitting, Cuff Size: Adult)   Pulse 76   Temp 98.3 °F (36.8 °C)   Resp 16   Ht 162.6 cm (64.02\")   Wt 63.5 kg (140 lb)   SpO2 95% Comment: 3 liters  BMI 24.02 kg/m²       Objective     Physical Exam  Vitals reviewed.   Constitutional:       General: She is not in acute distress.     Appearance: Normal appearance. She is not ill-appearing.   HENT:      Head: Normocephalic and atraumatic.      Nose: Nose normal.      Mouth/Throat:      Mouth: Mucous membranes are moist.      Pharynx: Oropharynx is clear.   Eyes:      Extraocular Movements: Extraocular movements intact.      Conjunctiva/sclera: Conjunctivae normal.      Pupils: Pupils are equal, round, and reactive to light.   Cardiovascular:      Rate and Rhythm: Normal rate and regular rhythm.      Pulses: Normal pulses.      Heart sounds: Normal heart sounds.   Pulmonary:      Effort: Pulmonary effort is " normal. No respiratory distress.      Breath sounds: Normal breath sounds. No stridor. No wheezing, rhonchi or rales.   Abdominal:      General: Bowel sounds are normal.   Musculoskeletal:         General: Normal range of motion.      Cervical back: Normal range of motion and neck supple.   Skin:     General: Skin is warm and dry.   Neurological:      Mental Status: She is alert and oriented to person, place, and time.   Psychiatric:         Behavior: Behavior normal.         Results Review  I have personally reviewed the prior office notes, hospital records, labs, and diagnostics.    XR Chest 1 View [[id number]] (Order [id number])  Order  Status: Final result     Study Notes     Idalmis Rogers on 9/30/2024 11:02 PM EDT   Short of breath.. dialysis today     Appointment Information    PACS Images     Radiology Images  Study Result    Narrative & Impression   XR CHEST 1 VW     Date of Exam: 9/30/2024 10:56 PM EDT     Indication: SOA Triage Protocol     Comparison: 9/15/2024     Findings:  Diffuse atherosclerotic disease is present. Heart borders are partially obscured. Redemonstrated is volume loss and postoperative change in the left hemithorax with pleural thickening and pleural plaquing. There is shift of the midline structures to the   left that is stable. Right midlung chronic fibrotic changes also stable. The right lung is otherwise clear today. No pneumothorax on either side of the chest.     IMPRESSION:  No clearly acute findings. Chronic changes in the left hemithorax appear stable, as does fibrosis in the right midlung.        Electronically Signed: Weston Castillo MD    9/30/2024 11:06 PM EDT    Workstation ID: [id number]        File Link    Scan on 9/19/2024 1718 by Laura Franco APRN: [id number]        Key Information    Document ID File Type Document Type Description   [id number] Image LABORATORY - SCAN ALPHA-1-ANTITRYPSIN     Import Information    Attached At Date Time User Dept   Order Level  9/19/2024  5:18 PM Laura Franco APRN      Order    Alpha-1-Antitrypsin [[id number]]     Encounter    Office Visit on 9/12/24 with Laura Franco APRN       Assessment         Patient Active Problem List   Diagnosis    Malignant neoplasm of upper lobe of right lung    Rheumatoid arthritis    Asthma    Chronic heart failure with preserved ejection fraction    Essential hypertension    GERD (gastroesophageal reflux disease)    Hyperlipidemia LDL goal <70    Lumbar spinal stenosis    Migraine    Monoclonal paraproteinemia    Osteopenia    Oxygen dependent    Peripheral neuropathy    Stage 4 chronic kidney disease    Vitamin D deficiency    Carotid artery stenosis    Chronic right-sided thoracic back pain    Peripheral vascular disease of lower extremity    Ex-smoker    De Quervain's tenosynovitis    Arthritis of carpometacarpal (CMC) joint of right thumb    Steal syndrome of dialysis vascular access    Anemia of chronic renal failure, stage 4 (severe)    Aortic stenosis, moderate    Hematoma    End stage renal disease on dialysis    Coronary artery calcification seen on CT scan    Frailty syndrome in geriatric patient    COPD with lower respiratory infection    Coagulation defect, unspecified    Hyperphosphatemia    Hypothyroidism (acquired)    Idiopathic osteoarthritis    Secondary multiple arthritis    Type 2 diabetes mellitus with diabetic peripheral angiopathy without gangrene    Acute on chronic respiratory failure with hypoxia and hypercapnia    Elevated troponin level not due myocardial infarction    Renal artery stenosis    S/P renal artery angioplasty    Atherosclerosis of renal artery    Abnormal serum immunoelectrophoresis    ESRD (end stage renal disease) on dialysis    Hypoxia    NSVT (nonsustained ventricular tachycardia)    Hypertensive heart and chronic kidney disease with heart failure and with stage 5 chronic kidney disease, or end stage renal disease    Unspecified protein-calorie malnutrition     IFG (impaired fasting glucose)    Iron deficiency anemia        Plan     Diagnoses and all orders for this visit:    1. Pseudomonas respiratory infection (Primary)  Comments:  Continue with tobramycin nebulizers for 30 days on and 30 days off as prescribed    2. Chronic obstructive pulmonary disease, unspecified COPD type  Comments:  Continue with Pulmicort twice daily and albuterol as needed    3. ESRD (end stage renal disease) on dialysis  Comments:  Follow-up with nephrology as scheduled    4. Nicotine dependence, cigarettes, in remission  Comments:  Patient does not qualify for annual low-dose CT due to age    5. Chronic respiratory failure with hypoxia  Comments:  Continue with O2 at 3 L continuous flow as patient is benefiting from supplemental oxygen    6. Chronic heart failure with preserved ejection fraction  Comments:  Follow-up with cardiology as scheduled             Smoking status:  reports that she quit smoking about 20 years ago. Her smoking use included cigarettes. She started smoking about 72 years ago. She has a 52.5 pack-year smoking history. She has been exposed to tobacco smoke. She has never used smokeless tobacco.    Vaccination status: Reviewed  Immunization History   Administered Date(s) Administered    ABRYSVO (RSV, 60+ or pregnant women 32-36 wks) 09/19/2024    COVID-19 (PFIZER) 12YRS+ (COMIRNATY) 10/19/2023, 10/10/2024    COVID-19 (PFIZER) BIVALENT 12+YRS 10/13/2022    COVID-19 (PFIZER) Purple Cap Monovalent 03/07/2021, 04/04/2021, 10/31/2021    FLUAD TRI 65YR+ 10/10/2024    Fluad Quad 65+ 10/19/2023    Fluzone (or Fluarix & Flulaval for VFC) >6mos 09/21/2016    Fluzone High-Dose 65+YRS 10/09/2018, 10/15/2020, 10/12/2021, 09/19/2022    Hepatitis B 2 Dose Vaccine Heplisav-B 08/28/2024, 09/23/2024, 10/23/2024    Influenza Seasonal Injectable 10/01/2017, 10/01/2018    Influenza, Unspecified 10/13/2021    Pneumococcal Conjugate 13-Valent (PCV13) 10/21/2015    Pneumococcal Conjugate 20-Valent  (PCV20) 08/30/2024    Pneumococcal Polysaccharide (PPSV23) 10/30/2003, 11/08/2016        Medications personally reviewed    Follow Up  Return in about 4 months (around 4/10/2025) for With Dr. Yanez.    Patient was given instructions and counseling regarding her condition or for health maintenance advice. Please see specific information pulled into the AVS if appropriate.     I spent 15 minutes caring for Charlette Rai on this date of service. This time includes time spent by me in the following activities:preparing for the visit, reviewing tests, obtaining and/or reviewing a separately obtained history, performing a medically appropriate examination and/or evaluation, counseling and educating the patient/family/caregiver, ordering medications, tests, or procedures, documenting information in the medical record, independently interpreting results and communicating that information with the patient/family/caregiver and answered questions family members, discuss medications.

## 2024-12-10 ENCOUNTER — OFFICE VISIT (OUTPATIENT)
Dept: PULMONOLOGY | Facility: CLINIC | Age: 89
End: 2024-12-10
Payer: MEDICARE

## 2024-12-10 VITALS
OXYGEN SATURATION: 95 % | WEIGHT: 140 LBS | RESPIRATION RATE: 16 BRPM | HEART RATE: 76 BPM | HEIGHT: 64 IN | TEMPERATURE: 98.3 F | DIASTOLIC BLOOD PRESSURE: 64 MMHG | BODY MASS INDEX: 23.9 KG/M2 | SYSTOLIC BLOOD PRESSURE: 137 MMHG

## 2024-12-10 DIAGNOSIS — F17.211 NICOTINE DEPENDENCE, CIGARETTES, IN REMISSION: ICD-10-CM

## 2024-12-10 DIAGNOSIS — Z99.2 ESRD (END STAGE RENAL DISEASE) ON DIALYSIS: ICD-10-CM

## 2024-12-10 DIAGNOSIS — N18.6 ESRD (END STAGE RENAL DISEASE) ON DIALYSIS: ICD-10-CM

## 2024-12-10 DIAGNOSIS — I50.32 CHRONIC HEART FAILURE WITH PRESERVED EJECTION FRACTION: ICD-10-CM

## 2024-12-10 DIAGNOSIS — J44.9 CHRONIC OBSTRUCTIVE PULMONARY DISEASE, UNSPECIFIED COPD TYPE: ICD-10-CM

## 2024-12-10 DIAGNOSIS — J96.11 CHRONIC RESPIRATORY FAILURE WITH HYPOXIA: ICD-10-CM

## 2024-12-10 DIAGNOSIS — J98.8 PSEUDOMONAS RESPIRATORY INFECTION: Primary | ICD-10-CM

## 2024-12-10 DIAGNOSIS — B96.5 PSEUDOMONAS RESPIRATORY INFECTION: Primary | ICD-10-CM

## 2024-12-10 PROCEDURE — 1159F MED LIST DOCD IN RCRD: CPT | Performed by: NURSE PRACTITIONER

## 2024-12-10 PROCEDURE — 1160F RVW MEDS BY RX/DR IN RCRD: CPT | Performed by: NURSE PRACTITIONER

## 2024-12-10 PROCEDURE — 99214 OFFICE O/P EST MOD 30 MIN: CPT | Performed by: NURSE PRACTITIONER

## 2024-12-11 ENCOUNTER — TELEPHONE (OUTPATIENT)
Dept: UROLOGY | Age: 89
End: 2024-12-11
Payer: MEDICARE

## 2024-12-11 NOTE — TELEPHONE ENCOUNTER
ATTENTION ESTEBAN    Caller: KEATON CARTER    Relationship to patient: DAUGHTER    Best call back number: [phone]    Chief complaint: PATIENT HAS A BLOOD CLOT IN HER PORT AND HAS TO GO TO [place] TOMORROW TO HAVE THIS REMOVED. THEREFORE SHE WILL NEED TO RESCHEDULE HER APPT WITH DR JURADO. KEATON CARTER WAS ASKING FOR NEXT TUESDAY THE 17TH IN THE AFTERNOON IF POSSIBLE, THAT WOULD BE THE ONLY DAY NEXT WEEK THEY ARE AVAIL. PLEASE REACH [phone]    Type of visit: FOLLOW UP

## 2024-12-27 ENCOUNTER — TELEPHONE (OUTPATIENT)
Dept: CARDIOLOGY | Facility: CLINIC | Age: 89
End: 2024-12-27
Payer: MEDICARE

## 2024-12-30 RX ORDER — IPRATROPIUM BROMIDE AND ALBUTEROL SULFATE 2.5; .5 MG/3ML; MG/3ML
SOLUTION RESPIRATORY (INHALATION)
Qty: 360 ML | Refills: 2 | Status: SHIPPED | OUTPATIENT
Start: 2024-12-30

## 2025-01-01 ENCOUNTER — TELEPHONE (OUTPATIENT)
Dept: INTERNAL MEDICINE | Age: 89
End: 2025-01-01
Payer: MEDICARE

## 2025-01-01 ENCOUNTER — APPOINTMENT (OUTPATIENT)
Dept: GENERAL RADIOLOGY | Facility: HOSPITAL | Age: 89
DRG: 871 | End: 2025-01-01
Payer: MEDICARE

## 2025-01-01 ENCOUNTER — APPOINTMENT (OUTPATIENT)
Dept: CT IMAGING | Facility: HOSPITAL | Age: 89
DRG: 871 | End: 2025-01-01
Payer: MEDICARE

## 2025-01-01 ENCOUNTER — APPOINTMENT (OUTPATIENT)
Dept: NEPHROLOGY | Facility: HOSPITAL | Age: 89
DRG: 871 | End: 2025-01-01
Payer: MEDICARE

## 2025-01-01 ENCOUNTER — APPOINTMENT (OUTPATIENT)
Dept: CARDIOLOGY | Facility: HOSPITAL | Age: 89
DRG: 871 | End: 2025-01-01
Payer: MEDICARE

## 2025-01-01 ENCOUNTER — HOSPITAL ENCOUNTER (INPATIENT)
Facility: HOSPITAL | Age: 89
LOS: 2 days | DRG: 871 | End: 2025-08-21
Attending: EMERGENCY MEDICINE | Admitting: STUDENT IN AN ORGANIZED HEALTH CARE EDUCATION/TRAINING PROGRAM
Payer: MEDICARE

## 2025-01-09 ENCOUNTER — OFFICE VISIT (OUTPATIENT)
Dept: UROLOGY | Age: 89
End: 2025-01-09
Payer: MEDICARE

## 2025-01-09 VITALS — WEIGHT: 140 LBS | BODY MASS INDEX: 23.9 KG/M2 | HEIGHT: 64 IN

## 2025-01-09 DIAGNOSIS — N28.1 RENAL CYST: ICD-10-CM

## 2025-01-09 DIAGNOSIS — S37.011A HEMATOMA OF RIGHT KIDNEY, INITIAL ENCOUNTER: Primary | ICD-10-CM

## 2025-01-09 NOTE — PROGRESS NOTES
UROLOGY OFFICE H&P NOTE    Subjective   HPI  Charlette Rai is a 89 y.o. female.  Presents for evaluation of right perinephric hematoma.  Patient initially presented to ER 3/3/2024 for right acute flank pain with associated nausea.  Diagnosed with large right perinephric hematoma, transferred to [facility] in case if needed intervention.  Patient was managed conservatively, eventually discharged home.    History of ESRD, on dialysis.  History of Present Illness  She is accompanied by her daughter.    She was initially evaluated in the emergency room in 03/2024 due to right-sided pain. Due to the potential need for intervention, she was referred to [facility] where she was under observation without any procedures being performed. She was diagnosed with a perirenal hematoma. A subsequent scan in 05/2024 indicated that the hematoma was resolving.     She continues to experience persistent bilateral back pain, which is not alleviated by analgesics. The pain is exacerbated by prolonged sitting during her dialysis sessions, which last approximately 4 hours.   She recalls an episode of severe back pain in 03/2024 prior to presentation ER, which occurred a few days after engaging in household cleaning activities.          __________  CT abdomen with and without contrast 11/26/2024: (Urologic findings)  . Very thin residual right renal subcapsular hematoma decreasing from prior exam.  2. Grossly stable multiple simple and probable mildly complex Bosniak 2 hemorrhagic/proteinaceous cyst detailed above. No lesion shows convincing postcontrast enhancement.     CT angiogram abdomen 5/31/2024: (Urologic findings) The right renal subcapsular hematoma has decreased in size since  3/8/2024.           Medical History:  Past Medical History:   Diagnosis Date    Allergic rhinitis     Anaphylactic shock, unspecified, initial encounter 12/05/2023    Anemia     Arthritis     Asthma     USE INHALERS AND NEBULIZERS    Back pain      Bladder disorder     Cancer     LEFT LUNG CANCER  SURGERY AND CHEMO DONE  AND CURRENTLY   RIGHT LUNG CANCER HAS ONLY RECEIVED RADIATION THUS FAR    Chronic kidney disease     stage 3    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 DENIES CP BUT DOES HAVE SOA CHRONIC ISSUE COPD/LUNG CANCER    COPD (chronic obstructive pulmonary disease)     FOLLOWED BY DR MARIAJOSE BAILEY    Coronary artery disease     DENIES CP BUT DOES GET SOA MOST OF THE TIME WITH EXERTION BUT OCC AT REST CHRONIC ISSUE COPD/LUNG CANCER    Deep vein thrombosis     Diabetes mellitus     DOES NOT CHECK BS DAILY    Disease of thyroid gland     HYPOTHYROIDISM    Essential hypertension     Gastric ulcer     GERD (gastroesophageal reflux disease)     Heart murmur     History of transfusion     NO ISSUES POST TRANSFUSION WAS MANY YEARS AGO    Hyperlipemia     Leukocytopenia     FOLLOWED BY DR MIR BAILEY    Limb swelling     Lumbago     Lumbar spinal stenosis     Lung cancer     Migraine headache     Multiple joint pain     On home oxygen therapy     3L/NC PRN    Osteopenia     PNA (pneumonia) 2024    Pseudomonas pneumonia 2024    Reflux esophagitis     Shortness of breath     Thyroid nodule     HAS ULTRASOUND YEARLY BEING MONITORED    Vascular disease     Vitamin D deficiency         Social History:  Social History     Socioeconomic History    Marital status:    Tobacco Use    Smoking status: Former     Current packs/day: 0.00     Average packs/day: 1 pack/day for 52.5 years (52.5 ttl pk-yrs)     Types: Cigarettes     Start date:      Quit date: 2004     Years since quittin.5     Passive exposure: Past    Smokeless tobacco: Never   Vaping Use    Vaping status: Never Used   Substance and Sexual Activity    Alcohol use: Never    Drug use: Never    Sexual  activity: Defer        Family History:  Family History   Problem Relation Age of Onset    Arthritis Mother     Arthritis Father     Cancer Brother     Diabetes Brother     Other Brother         blood disease     Prostate cancer Brother     Cancer Brother     Prostate cancer Brother     Cancer Brother     Cancer Brother     Malig Hyperthermia Neg Hx     Colon cancer Neg Hx         Surgical History:  Past Surgical History:   Procedure Laterality Date    ABDOMINAL SURGERY      ANGIOPLASTY RENAL ARTERY Left 06/14/2024    stent placement    APPENDECTOMY      ARTERIOGRAM N/A 6/14/2024    Procedure: Arteriogram, right radial access, aortogram and renal arteriogram with possible intervention.;  Surgeon: Dio Miguel MD;  Location: [facility] CATH INVASIVE LOCATION;  Service: Peripheral Vascular;  Laterality: N/A;    ARTERIOVENOUS FISTULA/SHUNT SURGERY Left 05/10/2022    Procedure: Left basilic vein transposition;  Surgeon: Wan Barksdale MD;  Location: [facility] MAIN OR;  Service: Vascular;  Laterality: Left;    ARTERIOVENOUS FISTULA/SHUNT SURGERY Left 03/23/2023    Procedure: Ligation of left arm arteriovenous fistula;  Surgeon: Wan Barksdale MD;  Location: [facility] MAIN OR;  Service: Vascular;  Laterality: Left;    ARTERIOVENOUS FISTULA/SHUNT SURGERY Left 11/30/2023    Procedure: Creation of left arm arteriovenous graft;  Surgeon: Wan Barksdale MD;  Location: [facility] MAIN OR;  Service: Vascular;  Laterality: Left;    BRONCHOSCOPY N/A 04/24/2024    Procedure: BRONCHOSCOPY WITH BAL AND WASHINGS;  Surgeon: Khalif Yanez MD;  Location: [facility] ENDOSCOPY;  Service: Pulmonary;  Laterality: N/A;  MUCUS PLUGGING    CARDIAC CATHETERIZATION  1996    CARDIAC SURGERY      CARDIAC SURGERY      fluid drained from heart    CATARACT EXTRACTION, BILATERAL  2003    COLONOSCOPY  2014    ENDOSCOPY  2016    2019    FEMORAL ARTERY STENT Bilateral     HYSTERECTOMY      LUNG BIOPSY Left 2005    lobectomy upper lung caner    LUNG VOLUME REDUCTION       OTHER SURGICAL HISTORY      artifical joints/limbs    REPLACEMENT TOTAL KNEE Left 2016    UPPER GASTROINTESTINAL ENDOSCOPY          Allergies:  No Known Allergies     Current Medications:  Current Outpatient Medications   Medication Sig Dispense Refill    acetaminophen (TYLENOL) 325 MG tablet Take 2 tablets by mouth Every 6 (Six) Hours As Needed for Mild Pain.      albuterol sulfate  (90 Base) MCG/ACT inhaler Inhale 2 puffs Every 4 (Four) Hours As Needed for Wheezing. 18 g 11    amLODIPine (NORVASC) 2.5 MG tablet Take 1 tablet by mouth As Needed (Once daily for a systolic blood pressure greater than 150). For a systolic blood pressure greater than 150 90 tablet 3    aspirin 81 MG EC tablet Take 1 tablet by mouth Daily.      budesonide (Pulmicort) 0.5 MG/2ML nebulizer solution Take 2 mL by nebulization 2 (Two) Times a Day. 60 each 5    carvedilol (COREG) 25 MG tablet Take 1 tablet by mouth 2 (Two) Times a Day With Meals. 180 tablet 3    cephalexin (KEFLEX) 250 MG capsule Take 1 capsule by mouth 3 times a day.      clopidogrel (Plavix) 75 MG tablet Take 1 tablet by mouth Daily. 90 tablet 3    dexlansoprazole (DEXILANT) 60 MG capsule Take 1 capsule by mouth Daily. 90 capsule 3    fluticasone (FLONASE) 50 MCG/ACT nasal spray       furosemide (LASIX) 20 MG tablet Take 1 tablet by mouth Daily. 90 tablet 3    hydrALAZINE (APRESOLINE) 25 MG tablet Take 1 tablet by mouth 2 (Two) Times a Day. (Patient taking differently: Take 1 tablet by mouth 2 (Two) Times a Day. Patient reports taking once a day) 180 tablet 3    ipratropium-albuterol (DUO-NEB) 0.5-2.5 mg/3 ml nebulizer INHALE 1 vial (3 ml) per NEBULIZER FOUR TIMES DAILY FOR 30 DAYS 360 mL 2    isosorbide mononitrate (IMDUR) 30 MG 24 hr tablet Take 1 tablet by mouth Every Night. 90 tablet 3    levocetirizine (XYZAL) 5 MG tablet TAKE 1 TABLET DAILY (Patient taking differently: Take 1 tablet by mouth Every Evening.) 90 tablet 1    levothyroxine (SYNTHROID,  "LEVOTHROID) 25 MCG tablet Take 1 tablet by mouth Daily.      lidocaine-prilocaine (EMLA) 2.5-2.5 % cream apply to dialysis access site 30-45 minutes prior to dialysis      linagliptin (Tradjenta) 5 MG tablet tablet Take 1 tablet by mouth Daily. 90 tablet 1    losartan (Cozaar) 50 MG tablet Take 1 tablet by mouth 2 (Two) Times a Day. 180 tablet 3    Methoxy PEG-Epoetin Beta (MIRCERA IJ) 60 mcg Every 14 (Fourteen) Days.      nitroglycerin (NITROSTAT) 0.4 MG SL tablet Place 1 tablet under the tongue Every 5 (Five) Minutes As Needed for Chest Pain.      ofloxacin (OCUFLOX) 0.3 % ophthalmic solution instill 4 DROPS into the affected ear(s) TWICE DAILY FOR 10 DAYS      ondansetron (ZOFRAN) 4 MG tablet Take 1 tablet by mouth Every 8 (Eight) Hours As Needed for Nausea or Vomiting.      rosuvastatin (CRESTOR) 20 MG tablet Take 1 tablet by mouth Every Night. 90 tablet 3    sevelamer (RENVELA) 800 MG tablet Take 1 tablet by mouth 3 (Three) Times a Day With Meals. 270 tablet 3    spironolactone (ALDACTONE) 25 MG tablet Take 1 tablet by mouth Every Other Day. 45 tablet 3    tobramycin PF (JUAN) 300 MG/5ML nebulizer solution Take 5 mL by nebulization 2 (Two) Times a Day. 30 days on 30 days off 300 mL 6    vitamin D (ERGOCALCIFEROL) 1.25 MG (32219 UT) capsule capsule Take 1 capsule by mouth Every 14 (Fourteen) Days. 6 capsule 3     No current facility-administered medications for this visit.       Review of systems  A review of systems was performed, and positive findings are noted in the HPI.    Objective     Vital Signs:   Ht 162.6 cm (64.02\")   Wt 63.5 kg (140 lb)   BMI 24.02 kg/m²       Physical exam  No acute distress, thin  Awake alert and oriented  Mood normal; affect normal  Physical Exam        Problem List:  Patient Active Problem List   Diagnosis    Malignant neoplasm of upper lobe of right lung    Rheumatoid arthritis    Asthma    Chronic heart failure with preserved ejection fraction    Essential hypertension    " GERD (gastroesophageal reflux disease)    Hyperlipidemia LDL goal <70    Lumbar spinal stenosis    Migraine    Monoclonal paraproteinemia    Osteopenia    Oxygen dependent    Peripheral neuropathy    Stage 4 chronic kidney disease    Vitamin D deficiency    Carotid artery stenosis    Chronic right-sided thoracic back pain    Peripheral vascular disease of lower extremity    Ex-smoker    De Quervain's tenosynovitis    Arthritis of carpometacarpal (CMC) joint of right thumb    Steal syndrome of dialysis vascular access    Anemia of chronic renal failure, stage 4 (severe)    Aortic stenosis, moderate    Hematoma    End stage renal disease on dialysis    Coronary artery calcification seen on CT scan    Frailty syndrome in geriatric patient    COPD with lower respiratory infection    Coagulation defect, unspecified    Hyperphosphatemia    Hypothyroidism (acquired)    Idiopathic osteoarthritis    Secondary multiple arthritis    Type 2 diabetes mellitus with diabetic peripheral angiopathy without gangrene    Acute on chronic respiratory failure with hypoxia and hypercapnia    Elevated troponin level not due myocardial infarction    Renal artery stenosis    S/P renal artery angioplasty    Atherosclerosis of renal artery    Abnormal serum immunoelectrophoresis    ESRD (end stage renal disease) on dialysis    Hypoxia    NSVT (nonsustained ventricular tachycardia)    Hypertensive heart and chronic kidney disease with heart failure and with stage 5 chronic kidney disease, or end stage renal disease    Unspecified protein-calorie malnutrition    IFG (impaired fasting glucose)    Iron deficiency anemia       Assessment & Plan   Diagnoses and all orders for this visit:    1. Hematoma of right kidney, initial encounter (Primary)    2. Renal cyst      CT scan imaging personally reviewed by me, demonstrated patient and daughter at length, compared to prior CTs 5/2024 in 3/2024.  Assessment & Plan    The recent CT scan shows almost  complete resolution of the hematoma around the right kidney, with no evidence of enhancing mass or underlying malignancy. The presence of cysts is noted, ossified is Bosniak 1 and 2 which do not require dedicated surveillance imaging and are not expected to be a significant source of pain.   The patient's back pain is likely unrelated to the resolved hematoma or the cysts.   Given the benign nature of the cysts (type I and type II), no further CT scans are necessary unless clinically indicated.     The patient was informed that the only treatment option for the cysts or if recurrence of spontaneous perinephric hematoma would be nephrectomy, which is not recommended the benefits would not outweigh the risk for her; high risk surgical candidate.  Should she have recurrence of severe pain, or spontaneous hematoma, nephrectomy or other intervention could be considered.    At this point, no further urologic workup or evaluation indicated.  Participated the discussion, note understanding       Encouraged to follow-up as needed   all questions addressed      Patient or patient representative verbalized consent for the use of Ambient Listening during the visit with  Laura Coleman MD for chart documentation. 1/9/2025  20:39 EST

## 2025-01-13 ENCOUNTER — APPOINTMENT (OUTPATIENT)
Dept: GENERAL RADIOLOGY | Facility: HOSPITAL | Age: 89
End: 2025-01-13
Payer: MEDICARE

## 2025-01-13 ENCOUNTER — HOSPITAL ENCOUNTER (OUTPATIENT)
Facility: HOSPITAL | Age: 89
Setting detail: HOSPITAL OUTPATIENT SURGERY
Discharge: HOME | End: 2025-01-13
Attending: SURGERY | Admitting: SURGERY
Payer: MEDICARE

## 2025-01-13 ENCOUNTER — PREP FOR SURGERY (OUTPATIENT)
Dept: OTHER | Facility: HOSPITAL | Age: 89
End: 2025-01-13
Payer: MEDICARE

## 2025-01-13 VITALS
WEIGHT: 143.74 LBS | TEMPERATURE: 97.9 F | DIASTOLIC BLOOD PRESSURE: 51 MMHG | BODY MASS INDEX: 23.95 KG/M2 | SYSTOLIC BLOOD PRESSURE: 164 MMHG | HEIGHT: 65 IN | RESPIRATION RATE: 18 BRPM | HEART RATE: 91 BPM | OXYGEN SATURATION: 100 %

## 2025-01-13 DIAGNOSIS — T82.590A AV GRAFT MALFUNCTION, INITIAL ENCOUNTER: ICD-10-CM

## 2025-01-13 DIAGNOSIS — R73.01 IFG (IMPAIRED FASTING GLUCOSE): ICD-10-CM

## 2025-01-13 DIAGNOSIS — Z99.81 OXYGEN DEPENDENT: ICD-10-CM

## 2025-01-13 DIAGNOSIS — N18.6 END STAGE RENAL DISEASE ON DIALYSIS: ICD-10-CM

## 2025-01-13 DIAGNOSIS — I50.32 CHRONIC HEART FAILURE WITH PRESERVED EJECTION FRACTION: ICD-10-CM

## 2025-01-13 DIAGNOSIS — E03.9 HYPOTHYROIDISM (ACQUIRED): ICD-10-CM

## 2025-01-13 DIAGNOSIS — J96.22 ACUTE ON CHRONIC RESPIRATORY FAILURE WITH HYPOXIA AND HYPERCAPNIA: ICD-10-CM

## 2025-01-13 DIAGNOSIS — I35.0 AORTIC STENOSIS, MODERATE: ICD-10-CM

## 2025-01-13 DIAGNOSIS — I10 ESSENTIAL HYPERTENSION: ICD-10-CM

## 2025-01-13 DIAGNOSIS — T82.590D MALFUNCTION OF ARTERIOVENOUS GRAFT, SUBSEQUENT ENCOUNTER: Primary | ICD-10-CM

## 2025-01-13 DIAGNOSIS — K21.9 GASTROESOPHAGEAL REFLUX DISEASE WITHOUT ESOPHAGITIS: ICD-10-CM

## 2025-01-13 DIAGNOSIS — N18.6 END STAGE RENAL DISEASE ON DIALYSIS: Primary | ICD-10-CM

## 2025-01-13 DIAGNOSIS — J96.21 ACUTE ON CHRONIC RESPIRATORY FAILURE WITH HYPOXIA AND HYPERCAPNIA: ICD-10-CM

## 2025-01-13 DIAGNOSIS — R54 FRAILTY SYNDROME IN GERIATRIC PATIENT: ICD-10-CM

## 2025-01-13 DIAGNOSIS — N18.6 ESRD (END STAGE RENAL DISEASE) ON DIALYSIS: ICD-10-CM

## 2025-01-13 DIAGNOSIS — E83.39 HYPERPHOSPHATEMIA: ICD-10-CM

## 2025-01-13 DIAGNOSIS — Z99.2 END STAGE RENAL DISEASE ON DIALYSIS: ICD-10-CM

## 2025-01-13 DIAGNOSIS — Z98.62 S/P RENAL ARTERY ANGIOPLASTY: ICD-10-CM

## 2025-01-13 DIAGNOSIS — N18.6 END STAGE RENAL DISEASE: ICD-10-CM

## 2025-01-13 DIAGNOSIS — Z99.2 END STAGE RENAL DISEASE ON DIALYSIS: Primary | ICD-10-CM

## 2025-01-13 DIAGNOSIS — E55.9 VITAMIN D DEFICIENCY: ICD-10-CM

## 2025-01-13 DIAGNOSIS — Z99.2 ESRD (END STAGE RENAL DISEASE) ON DIALYSIS: ICD-10-CM

## 2025-01-13 LAB
ALBUMIN SERPL-MCNC: 3.5 G/DL (ref 3.5–5.2)
ALBUMIN/GLOB SERPL: 1.3 G/DL
ALP SERPL-CCNC: 235 U/L (ref 39–117)
ALT SERPL W P-5'-P-CCNC: 21 U/L (ref 1–33)
ANION GAP SERPL CALCULATED.3IONS-SCNC: 8.6 MMOL/L (ref 5–15)
ANISOCYTOSIS BLD QL: NORMAL
AST SERPL-CCNC: 25 U/L (ref 1–32)
BASOPHILS # BLD AUTO: 0.03 10*3/MM3 (ref 0–0.2)
BASOPHILS NFR BLD AUTO: 0.6 % (ref 0–1.5)
BILIRUB SERPL-MCNC: 0.3 MG/DL (ref 0–1.2)
BUN SERPL-MCNC: 46 MG/DL (ref 8–23)
BUN/CREAT SERPL: 9.8 (ref 7–25)
CALCIUM SPEC-SCNC: 8.1 MG/DL (ref 8.6–10.5)
CHLORIDE SERPL-SCNC: 103 MMOL/L (ref 98–107)
CO2 SERPL-SCNC: 31.4 MMOL/L (ref 22–29)
CREAT SERPL-MCNC: 4.69 MG/DL (ref 0.57–1)
DEPRECATED RDW RBC AUTO: 52.2 FL (ref 37–54)
EGFRCR SERPLBLD CKD-EPI 2021: 8.6 ML/MIN/1.73
EOSINOPHIL # BLD AUTO: 0.22 10*3/MM3 (ref 0–0.4)
EOSINOPHIL NFR BLD AUTO: 4.8 % (ref 0.3–6.2)
ERYTHROCYTE [DISTWIDTH] IN BLOOD BY AUTOMATED COUNT: 13.6 % (ref 12.3–15.4)
GLOBULIN UR ELPH-MCNC: 2.6 GM/DL
GLUCOSE SERPL-MCNC: 98 MG/DL (ref 65–99)
HBA1C MFR BLD: 4.8 % (ref 4.8–5.6)
HCT VFR BLD AUTO: 25.6 % (ref 34–46.6)
HGB BLD-MCNC: 7.4 G/DL (ref 12–15.9)
IMM GRANULOCYTES # BLD AUTO: 0.02 10*3/MM3 (ref 0–0.05)
IMM GRANULOCYTES NFR BLD AUTO: 0.4 % (ref 0–0.5)
LARGE PLATELETS: NORMAL
LYMPHOCYTES # BLD AUTO: 0.61 10*3/MM3 (ref 0.7–3.1)
LYMPHOCYTES NFR BLD AUTO: 13.2 % (ref 19.6–45.3)
MCH RBC QN AUTO: 30.7 PG (ref 26.6–33)
MCHC RBC AUTO-ENTMCNC: 28.9 G/DL (ref 31.5–35.7)
MCV RBC AUTO: 106.2 FL (ref 79–97)
MONOCYTES # BLD AUTO: 0.65 10*3/MM3 (ref 0.1–0.9)
MONOCYTES NFR BLD AUTO: 14 % (ref 5–12)
NEUTROPHILS NFR BLD AUTO: 3.1 10*3/MM3 (ref 1.7–7)
NEUTROPHILS NFR BLD AUTO: 67 % (ref 42.7–76)
NRBC BLD AUTO-RTO: 0 /100 WBC (ref 0–0.2)
OVALOCYTES BLD QL SMEAR: NORMAL
PLATELET # BLD AUTO: 96 10*3/MM3 (ref 140–450)
PMV BLD AUTO: 9.7 FL (ref 6–12)
POIKILOCYTOSIS BLD QL SMEAR: NORMAL
POTASSIUM SERPL-SCNC: 5.8 MMOL/L (ref 3.5–5.2)
PROT SERPL-MCNC: 6.1 G/DL (ref 6–8.5)
RBC # BLD AUTO: 2.41 10*6/MM3 (ref 3.77–5.28)
SODIUM SERPL-SCNC: 143 MMOL/L (ref 136–145)
WBC MORPH BLD: NORMAL
WBC NRBC COR # BLD AUTO: 4.63 10*3/MM3 (ref 3.4–10.8)

## 2025-01-13 PROCEDURE — 85007 BL SMEAR W/DIFF WBC COUNT: CPT | Performed by: SURGERY

## 2025-01-13 PROCEDURE — 77001 FLUOROGUIDE FOR VEIN DEVICE: CPT | Performed by: SURGERY

## 2025-01-13 PROCEDURE — 71045 X-RAY EXAM CHEST 1 VIEW: CPT

## 2025-01-13 PROCEDURE — 25010000002 LIDOCAINE 2% SOLUTION: Performed by: SURGERY

## 2025-01-13 PROCEDURE — 36581 REPLACE TUNNELED CV CATH: CPT | Performed by: SURGERY

## 2025-01-13 PROCEDURE — C1750 CATH, HEMODIALYSIS,LONG-TERM: HCPCS | Performed by: SURGERY

## 2025-01-13 PROCEDURE — 83036 HEMOGLOBIN GLYCOSYLATED A1C: CPT | Performed by: INTERNAL MEDICINE

## 2025-01-13 PROCEDURE — 25010000002 CEFAZOLIN PER 500 MG: Performed by: SURGERY

## 2025-01-13 PROCEDURE — 85025 COMPLETE CBC W/AUTO DIFF WBC: CPT | Performed by: SURGERY

## 2025-01-13 PROCEDURE — C1769 GUIDE WIRE: HCPCS | Performed by: SURGERY

## 2025-01-13 PROCEDURE — 80053 COMPREHEN METABOLIC PANEL: CPT | Performed by: SURGERY

## 2025-01-13 RX ORDER — SODIUM CHLORIDE 0.9 % (FLUSH) 0.9 %
10 SYRINGE (ML) INJECTION EVERY 12 HOURS SCHEDULED
Status: CANCELLED | OUTPATIENT
Start: 2025-01-13

## 2025-01-13 RX ORDER — SODIUM CHLORIDE 9 MG/ML
40 INJECTION, SOLUTION INTRAVENOUS AS NEEDED
Status: DISCONTINUED | OUTPATIENT
Start: 2025-01-13 | End: 2025-01-13 | Stop reason: HOSPADM

## 2025-01-13 RX ORDER — SODIUM CHLORIDE 9 MG/ML
40 INJECTION, SOLUTION INTRAVENOUS AS NEEDED
Status: CANCELLED | OUTPATIENT
Start: 2025-01-13

## 2025-01-13 RX ORDER — SODIUM CHLORIDE 0.9 % (FLUSH) 0.9 %
10 SYRINGE (ML) INJECTION AS NEEDED
Status: DISCONTINUED | OUTPATIENT
Start: 2025-01-13 | End: 2025-01-13 | Stop reason: HOSPADM

## 2025-01-13 RX ORDER — LIDOCAINE HYDROCHLORIDE 20 MG/ML
INJECTION, SOLUTION INFILTRATION; PERINEURAL
Status: DISCONTINUED | OUTPATIENT
Start: 2025-01-13 | End: 2025-01-13 | Stop reason: HOSPADM

## 2025-01-13 RX ORDER — SODIUM CHLORIDE 0.9 % (FLUSH) 0.9 %
10 SYRINGE (ML) INJECTION EVERY 12 HOURS SCHEDULED
Status: DISCONTINUED | OUTPATIENT
Start: 2025-01-13 | End: 2025-01-13 | Stop reason: HOSPADM

## 2025-01-13 RX ORDER — SODIUM CHLORIDE 9 MG/ML
50 INJECTION, SOLUTION INTRAVENOUS CONTINUOUS
Status: DISCONTINUED | OUTPATIENT
Start: 2025-01-13 | End: 2025-01-13 | Stop reason: HOSPADM

## 2025-01-13 RX ORDER — SODIUM CHLORIDE 0.9 % (FLUSH) 0.9 %
10 SYRINGE (ML) INJECTION AS NEEDED
Status: CANCELLED | OUTPATIENT
Start: 2025-01-13

## 2025-01-13 NOTE — DISCHARGE INSTRUCTIONS
DISCHARGE INSTRUCTIONS  SURGICAL / AMBULATORY  PROCEDURES      For your surgery you had:  Local anesthesia  You may experience dizziness, drowsiness, or light-headedness for several hours following surgery/procedure.  Do not stay alone today or tonight.  Limit your activity for 24 hours.  Resume your diet slowly.  Follow whatever special dietary instructions you may have been given by your doctor.  You should not drive or operate machinery, drink alcohol, or sign legally binding documents for 24 hours or while you are taking pain medication.      NOTIFY YOUR DOCTOR IF YOU EXPERIENCE ANY OF THE FOLLOWING:  Temperature greater than 101 degrees Fahrenheit  Shaking Chills  Redness or excessive drainage from incision  Nausea, vomiting and/or pain that is not controlled by prescribed medications  Increase in bleeding or bleeding that is excessive  Unable to urinate in 6 hours after surgery  If unable to reach your doctor, please go to the closest Emergency Room  You may begin dressing changes:  per dialysis nurse  You may shower or bathe tomorrow, keep dressing clean and dry.  Apply an ice pack 24-48 hours.      SPECIAL INSTRUCTIONS:  No lifting    Last dose of pain medication was given at:   May take tylenol at anytime if needed.

## 2025-01-13 NOTE — H&P
[facility]   PREOPERATIVE HISTORY AND PHYSICAL    Patient Name:Charlette Rai  : 1935  MRN: [id number]  Primary Care Physician: Brennan Mosqueda MD  Date of admission: 2025    Subjective   Subjective     Chief Complaint: preoperative evaluation    History of Present Illness  Charlette Rai is a 89 y.o. female who presents for preoperative evaluation. She is scheduled for Hemodialysis Catheter (N/A), dialysis shuntogram (N/A)  Patient has had multiple fistulogram's with declots including a recent stent placement in the venous anastomosis.  It appears to have clotted again in a short period of time.  Current catheter is malfunctioning with leaks around it during dialysis.    Review of Systems   14 point review of system negative except as documented above.    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 DENIES CP BUT DOES HAVE SOA CHRONIC ISSUE COPD/LUNG CANCER    COPD (chronic obstructive pulmonary disease)     FOLLOWED BY DR MARIAJOSE BAILEY    Coronary artery disease     DENIES CP BUT DOES GET SOA MOST OF THE TIME WITH EXERTION BUT OCC AT REST CHRONIC ISSUE COPD/LUNG CANCER    Deep vein thrombosis     Diabetes mellitus     DOES NOT CHECK BS DAILY    Disease of thyroid gland     HYPOTHYROIDISM    Essential hypertension     Gastric ulcer     GERD (gastroesophageal reflux disease)     Heart murmur     History of  transfusion     NO ISSUES POST TRANSFUSION WAS MANY YEARS AGO    Hyperlipemia     Leukocytopenia     FOLLOWED BY DR MIR BAILEY    Limb swelling     Lumbago     Lumbar spinal stenosis     Lung cancer     Migraine headache     Multiple joint pain     On home oxygen therapy     3L/NC PRN    Osteopenia     PNA (pneumonia) 05/04/2024    Pseudomonas pneumonia 04/29/2024    Reflux esophagitis     Shortness of breath     Thyroid nodule     HAS ULTRASOUND YEARLY BEING MONITORED    Vascular disease     Vitamin D deficiency        Past Surgical History:   Procedure Laterality Date    ABDOMINAL SURGERY      ANGIOPLASTY RENAL ARTERY Left 06/14/2024    stent placement    APPENDECTOMY      ARTERIOGRAM N/A 6/14/2024    Procedure: Arteriogram, right radial access, aortogram and renal arteriogram with possible intervention.;  Surgeon: Dio Miguel MD;  Location: [facility] CATH INVASIVE LOCATION;  Service: Peripheral Vascular;  Laterality: N/A;    ARTERIOVENOUS FISTULA/SHUNT SURGERY Left 05/10/2022    Procedure: Left basilic vein transposition;  Surgeon: Wan Barksdale MD;  Location: [facility] MAIN OR;  Service: Vascular;  Laterality: Left;    ARTERIOVENOUS FISTULA/SHUNT SURGERY Left 03/23/2023    Procedure: Ligation of left arm arteriovenous fistula;  Surgeon: Wan Barksdale MD;  Location: [facility] MAIN OR;  Service: Vascular;  Laterality: Left;    ARTERIOVENOUS FISTULA/SHUNT SURGERY Left 11/30/2023    Procedure: Creation of left arm arteriovenous graft;  Surgeon: Wan Barksdale MD;  Location: [facility] MAIN OR;  Service: Vascular;  Laterality: Left;    BRONCHOSCOPY N/A 04/24/2024    Procedure: BRONCHOSCOPY WITH BAL AND WASHINGS;  Surgeon: Khalif Yanez MD;  Location: [facility] ENDOSCOPY;  Service: Pulmonary;  Laterality: N/A;  MUCUS PLUGGING    CARDIAC CATHETERIZATION  1996    CARDIAC SURGERY      CARDIAC SURGERY      fluid drained from heart    CATARACT EXTRACTION, BILATERAL  2003    COLONOSCOPY  2014    ENDOSCOPY  2016    2019    FEMORAL  ARTERY STENT Bilateral     HYSTERECTOMY      LUNG BIOPSY Left 2005    lobectomy upper lung caner    LUNG VOLUME REDUCTION      OTHER SURGICAL HISTORY      artifical joints/limbs    REPLACEMENT TOTAL KNEE Left 2016    UPPER GASTROINTESTINAL ENDOSCOPY         Family History: Her family history includes Arthritis in her father and mother; Cancer in her brother, brother, brother, and brother; Diabetes in her brother; Other in her brother; Prostate cancer in her brother and brother.     Social History: She  reports that she quit smoking about 20 years ago. Her smoking use included cigarettes. She started smoking about 73 years ago. She has a 52.5 pack-year smoking history. She has been exposed to tobacco smoke. She has never used smokeless tobacco. She reports that she does not drink alcohol and does not use drugs.    Home Medications:  Methoxy PEG-Epoetin Beta, acetaminophen, albuterol sulfate HFA, amLODIPine, aspirin, budesonide, carvedilol, cephalexin, clopidogrel, dexlansoprazole, fluticasone, furosemide, hydrALAZINE, ipratropium-albuterol, isosorbide mononitrate, levocetirizine, levothyroxine, lidocaine-prilocaine, linagliptin, losartan, nitroglycerin, ofloxacin, ondansetron, rosuvastatin, sevelamer, spironolactone, tobramycin PF, and vitamin D    Allergies:  She has No Known Allergies.    Objective    Objective     Vitals:         Physical Exam  General: Alert and oriented x 3.  Neck: Right IJ tunneled dialysis catheter site clean dry and intact.  Left upper extremity: Thrombosed AV graft.  Extremities: Palpable radial pulses bilaterally.    Assessment & Plan   Assessment / Plan     Brief Patient Summary:  Charlette Rai is a 89 y.o. female who presents for preoperative evaluation.    Pre-Op Diagnosis Codes:      * AV graft malfunction, initial encounter [T82.590A]    Active Hospital Problems:  Active Hospital Problems    Diagnosis     **AV graft malfunction, initial encounter      Plan:    Procedure(s):  Hemodialysis Catheter  dialysis shuntogram  Hemodialysis catheter insertion/replacement.  The risks, benefits, and alternatives of the procedure including but not limited to bleeding, infection, catheter malfunction and risks of the anesthesia were discussed in detail with the patient and questions were answered. No guarantees were made or implied. Informed consent was obtained.    Court Valente MD

## 2025-01-14 ENCOUNTER — LAB (OUTPATIENT)
Dept: LAB | Facility: HOSPITAL | Age: 89
End: 2025-01-14
Payer: MEDICARE

## 2025-01-14 DIAGNOSIS — N18.6 END STAGE RENAL DISEASE ON DIALYSIS: ICD-10-CM

## 2025-01-14 DIAGNOSIS — Z99.2 END STAGE RENAL DISEASE ON DIALYSIS: ICD-10-CM

## 2025-01-14 DIAGNOSIS — I50.32 CHRONIC HEART FAILURE WITH PRESERVED EJECTION FRACTION: ICD-10-CM

## 2025-01-14 LAB
ALBUMIN SERPL-MCNC: 3.4 G/DL (ref 3.5–5.2)
ALBUMIN/GLOB SERPL: 1.3 G/DL
ALP SERPL-CCNC: 233 U/L (ref 39–117)
ALT SERPL W P-5'-P-CCNC: 14 U/L (ref 1–33)
ANION GAP SERPL CALCULATED.3IONS-SCNC: 10.4 MMOL/L (ref 5–15)
AST SERPL-CCNC: 28 U/L (ref 1–32)
BASOPHILS # BLD AUTO: 0.03 10*3/MM3 (ref 0–0.2)
BASOPHILS NFR BLD AUTO: 0.6 % (ref 0–1.5)
BILIRUB SERPL-MCNC: 0.3 MG/DL (ref 0–1.2)
BUN SERPL-MCNC: 55 MG/DL (ref 8–23)
BUN/CREAT SERPL: 10.5 (ref 7–25)
CALCIUM SPEC-SCNC: 8.2 MG/DL (ref 8.6–10.5)
CHLORIDE SERPL-SCNC: 102 MMOL/L (ref 98–107)
CO2 SERPL-SCNC: 29.6 MMOL/L (ref 22–29)
CREAT SERPL-MCNC: 5.26 MG/DL (ref 0.57–1)
DEPRECATED RDW RBC AUTO: 45.8 FL (ref 37–54)
EGFRCR SERPLBLD CKD-EPI 2021: 7.5 ML/MIN/1.73
EOSINOPHIL # BLD AUTO: 0.23 10*3/MM3 (ref 0–0.4)
EOSINOPHIL NFR BLD AUTO: 4.4 % (ref 0.3–6.2)
ERYTHROCYTE [DISTWIDTH] IN BLOOD BY AUTOMATED COUNT: 12.4 % (ref 12.3–15.4)
GLOBULIN UR ELPH-MCNC: 2.7 GM/DL
GLUCOSE SERPL-MCNC: 99 MG/DL (ref 65–99)
HCT VFR BLD AUTO: 25.4 % (ref 34–46.6)
HGB BLD-MCNC: 7.6 G/DL (ref 12–15.9)
IMM GRANULOCYTES # BLD AUTO: 0.02 10*3/MM3 (ref 0–0.05)
IMM GRANULOCYTES NFR BLD AUTO: 0.4 % (ref 0–0.5)
LYMPHOCYTES # BLD AUTO: 0.62 10*3/MM3 (ref 0.7–3.1)
LYMPHOCYTES NFR BLD AUTO: 12 % (ref 19.6–45.3)
MAGNESIUM SERPL-MCNC: 2.2 MG/DL (ref 1.6–2.4)
MCH RBC QN AUTO: 30.5 PG (ref 26.6–33)
MCHC RBC AUTO-ENTMCNC: 29.9 G/DL (ref 31.5–35.7)
MCV RBC AUTO: 102 FL (ref 79–97)
MONOCYTES # BLD AUTO: 0.62 10*3/MM3 (ref 0.1–0.9)
MONOCYTES NFR BLD AUTO: 12 % (ref 5–12)
NEUTROPHILS NFR BLD AUTO: 3.66 10*3/MM3 (ref 1.7–7)
NEUTROPHILS NFR BLD AUTO: 70.6 % (ref 42.7–76)
NRBC BLD AUTO-RTO: 0 /100 WBC (ref 0–0.2)
NT-PROBNP SERPL-MCNC: ABNORMAL PG/ML (ref 0–1800)
PLATELET # BLD AUTO: 108 10*3/MM3 (ref 140–450)
PMV BLD AUTO: 10.6 FL (ref 6–12)
POTASSIUM SERPL-SCNC: 6 MMOL/L (ref 3.5–5.2)
PROT SERPL-MCNC: 6.1 G/DL (ref 6–8.5)
RBC # BLD AUTO: 2.49 10*6/MM3 (ref 3.77–5.28)
SODIUM SERPL-SCNC: 142 MMOL/L (ref 136–145)
WBC NRBC COR # BLD AUTO: 5.18 10*3/MM3 (ref 3.4–10.8)

## 2025-01-14 PROCEDURE — 83735 ASSAY OF MAGNESIUM: CPT

## 2025-01-14 PROCEDURE — 80053 COMPREHEN METABOLIC PANEL: CPT

## 2025-01-14 PROCEDURE — 36415 COLL VENOUS BLD VENIPUNCTURE: CPT

## 2025-01-14 PROCEDURE — 85025 COMPLETE CBC W/AUTO DIFF WBC: CPT

## 2025-01-14 PROCEDURE — 83880 ASSAY OF NATRIURETIC PEPTIDE: CPT

## 2025-01-14 NOTE — PROGRESS NOTES
Spoke to nephology who states for labs to be sent to dialysis center. Sent labs to nephrology and dialysis center.

## 2025-01-26 ENCOUNTER — HOSPITAL ENCOUNTER (INPATIENT)
Facility: HOSPITAL | Age: 89
LOS: 3 days | Discharge: HOME | End: 2025-02-01
Attending: EMERGENCY MEDICINE | Admitting: STUDENT IN AN ORGANIZED HEALTH CARE EDUCATION/TRAINING PROGRAM
Payer: MEDICARE

## 2025-01-26 DIAGNOSIS — R26.2 UNABLE TO AMBULATE: ICD-10-CM

## 2025-01-26 DIAGNOSIS — M48.062 SPINAL STENOSIS OF LUMBAR REGION WITH NEUROGENIC CLAUDICATION: ICD-10-CM

## 2025-01-26 DIAGNOSIS — I73.9 PERIPHERAL VASCULAR DISEASE OF LOWER EXTREMITY: ICD-10-CM

## 2025-01-26 DIAGNOSIS — Z78.9 IMPAIRED MOBILITY AND ADLS: ICD-10-CM

## 2025-01-26 DIAGNOSIS — R26.2 DIFFICULTY WALKING: ICD-10-CM

## 2025-01-26 DIAGNOSIS — M79.605 ACUTE LEG PAIN, LEFT: ICD-10-CM

## 2025-01-26 DIAGNOSIS — Z74.09 IMPAIRED MOBILITY AND ADLS: ICD-10-CM

## 2025-01-26 DIAGNOSIS — R60.0 EDEMA OF LEFT LOWER EXTREMITY: Primary | ICD-10-CM

## 2025-01-26 DIAGNOSIS — N18.6 ESRD (END STAGE RENAL DISEASE): ICD-10-CM

## 2025-01-26 LAB
ALBUMIN SERPL-MCNC: 3.3 G/DL (ref 3.5–5.2)
ALBUMIN/GLOB SERPL: 1.3 G/DL
ALP SERPL-CCNC: 292 U/L (ref 39–117)
ALT SERPL W P-5'-P-CCNC: 14 U/L (ref 1–33)
ANION GAP SERPL CALCULATED.3IONS-SCNC: 11.2 MMOL/L (ref 5–15)
ANISOCYTOSIS BLD QL: NORMAL
APTT PPP: 32 SECONDS (ref 24.2–34.2)
AST SERPL-CCNC: 25 U/L (ref 1–32)
BASOPHILS # BLD AUTO: 0.02 10*3/MM3 (ref 0–0.2)
BASOPHILS NFR BLD AUTO: 0.5 % (ref 0–1.5)
BILIRUB SERPL-MCNC: 0.2 MG/DL (ref 0–1.2)
BUN SERPL-MCNC: 34 MG/DL (ref 8–23)
BUN/CREAT SERPL: 7.2 (ref 7–25)
CALCIUM SPEC-SCNC: 7.8 MG/DL (ref 8.6–10.5)
CHLORIDE SERPL-SCNC: 105 MMOL/L (ref 98–107)
CO2 SERPL-SCNC: 28.8 MMOL/L (ref 22–29)
CREAT SERPL-MCNC: 4.7 MG/DL (ref 0.57–1)
DEPRECATED RDW RBC AUTO: 56.5 FL (ref 37–54)
EGFRCR SERPLBLD CKD-EPI 2021: 8.5 ML/MIN/1.73
EOSINOPHIL # BLD AUTO: 0.15 10*3/MM3 (ref 0–0.4)
EOSINOPHIL NFR BLD AUTO: 3.8 % (ref 0.3–6.2)
ERYTHROCYTE [DISTWIDTH] IN BLOOD BY AUTOMATED COUNT: 15.1 % (ref 12.3–15.4)
GLOBULIN UR ELPH-MCNC: 2.5 GM/DL
GLUCOSE SERPL-MCNC: 188 MG/DL (ref 65–99)
HCT VFR BLD AUTO: 26.5 % (ref 34–46.6)
HGB BLD-MCNC: 7.5 G/DL (ref 12–15.9)
HOLD SPECIMEN: NORMAL
HOLD SPECIMEN: NORMAL
IMM GRANULOCYTES # BLD AUTO: 0.01 10*3/MM3 (ref 0–0.05)
IMM GRANULOCYTES NFR BLD AUTO: 0.3 % (ref 0–0.5)
INR PPP: 1.2 (ref 0.86–1.15)
LYMPHOCYTES # BLD AUTO: 0.65 10*3/MM3 (ref 0.7–3.1)
LYMPHOCYTES NFR BLD AUTO: 16.7 % (ref 19.6–45.3)
MCH RBC QN AUTO: 31 PG (ref 26.6–33)
MCHC RBC AUTO-ENTMCNC: 28.3 G/DL (ref 31.5–35.7)
MCV RBC AUTO: 109.5 FL (ref 79–97)
MONOCYTES # BLD AUTO: 0.72 10*3/MM3 (ref 0.1–0.9)
MONOCYTES NFR BLD AUTO: 18.5 % (ref 5–12)
NEUTROPHILS NFR BLD AUTO: 2.35 10*3/MM3 (ref 1.7–7)
NEUTROPHILS NFR BLD AUTO: 60.2 % (ref 42.7–76)
NRBC BLD AUTO-RTO: 0 /100 WBC (ref 0–0.2)
PLATELET # BLD AUTO: 116 10*3/MM3 (ref 140–450)
PMV BLD AUTO: 10.7 FL (ref 6–12)
POLYCHROMASIA BLD QL SMEAR: NORMAL
POTASSIUM SERPL-SCNC: 5.2 MMOL/L (ref 3.5–5.2)
PROT SERPL-MCNC: 5.8 G/DL (ref 6–8.5)
PROTHROMBIN TIME: 15.4 SECONDS (ref 11.8–14.9)
RBC # BLD AUTO: 2.42 10*6/MM3 (ref 3.77–5.28)
SMALL PLATELETS BLD QL SMEAR: NORMAL
SODIUM SERPL-SCNC: 145 MMOL/L (ref 136–145)
WBC MORPH BLD: NORMAL
WBC NRBC COR # BLD AUTO: 3.9 10*3/MM3 (ref 3.4–10.8)
WHOLE BLOOD HOLD COAG: NORMAL
WHOLE BLOOD HOLD SPECIMEN: NORMAL

## 2025-01-26 PROCEDURE — 80053 COMPREHEN METABOLIC PANEL: CPT | Performed by: EMERGENCY MEDICINE

## 2025-01-26 PROCEDURE — 36415 COLL VENOUS BLD VENIPUNCTURE: CPT

## 2025-01-26 PROCEDURE — 84466 ASSAY OF TRANSFERRIN: CPT | Performed by: INTERNAL MEDICINE

## 2025-01-26 PROCEDURE — 82728 ASSAY OF FERRITIN: CPT | Performed by: INTERNAL MEDICINE

## 2025-01-26 PROCEDURE — 85610 PROTHROMBIN TIME: CPT | Performed by: EMERGENCY MEDICINE

## 2025-01-26 PROCEDURE — 83540 ASSAY OF IRON: CPT | Performed by: INTERNAL MEDICINE

## 2025-01-26 PROCEDURE — 85730 THROMBOPLASTIN TIME PARTIAL: CPT | Performed by: EMERGENCY MEDICINE

## 2025-01-26 PROCEDURE — 85025 COMPLETE CBC W/AUTO DIFF WBC: CPT | Performed by: EMERGENCY MEDICINE

## 2025-01-26 PROCEDURE — 99285 EMERGENCY DEPT VISIT HI MDM: CPT

## 2025-01-26 PROCEDURE — 85007 BL SMEAR W/DIFF WBC COUNT: CPT | Performed by: EMERGENCY MEDICINE

## 2025-01-26 RX ORDER — ACETAMINOPHEN 500 MG
500 TABLET ORAL ONCE
Status: COMPLETED | OUTPATIENT
Start: 2025-01-26 | End: 2025-01-26

## 2025-01-26 RX ADMIN — ACETAMINOPHEN 500 MG: 500 TABLET ORAL at 23:48

## 2025-01-27 ENCOUNTER — APPOINTMENT (OUTPATIENT)
Dept: CT IMAGING | Facility: HOSPITAL | Age: 89
End: 2025-01-27
Payer: MEDICARE

## 2025-01-27 ENCOUNTER — APPOINTMENT (OUTPATIENT)
Facility: HOSPITAL | Age: 89
End: 2025-01-27
Payer: MEDICARE

## 2025-01-27 ENCOUNTER — APPOINTMENT (OUTPATIENT)
Dept: GENERAL RADIOLOGY | Facility: HOSPITAL | Age: 89
End: 2025-01-27
Payer: MEDICARE

## 2025-01-27 PROBLEM — R53.1 WEAKNESS: Status: ACTIVE | Noted: 2025-01-27

## 2025-01-27 LAB
APTT PPP: 29.9 SECONDS (ref 78–95.9)
APTT PPP: 47.2 SECONDS (ref 78–95.9)
BASOPHILS # BLD AUTO: 0.02 10*3/MM3 (ref 0–0.2)
BASOPHILS NFR BLD AUTO: 0.4 % (ref 0–1.5)
BH CV LOWER VASCULAR LEFT COMMON FEMORAL AUGMENT: NORMAL
BH CV LOWER VASCULAR LEFT COMMON FEMORAL COMPETENT: NORMAL
BH CV LOWER VASCULAR LEFT COMMON FEMORAL COMPRESS: NORMAL
BH CV LOWER VASCULAR LEFT COMMON FEMORAL PHASIC: NORMAL
BH CV LOWER VASCULAR LEFT COMMON FEMORAL SPONT: NORMAL
BH CV LOWER VASCULAR LEFT DISTAL FEMORAL COMPRESS: NORMAL
BH CV LOWER VASCULAR LEFT GASTRONEMIUS COMPRESS: NORMAL
BH CV LOWER VASCULAR LEFT GREATER SAPH AK AUGMENT: NORMAL
BH CV LOWER VASCULAR LEFT GREATER SAPH AK COMPETENT: NORMAL
BH CV LOWER VASCULAR LEFT GREATER SAPH AK COMPRESS: NORMAL
BH CV LOWER VASCULAR LEFT GREATER SAPH AK PHASIC: NORMAL
BH CV LOWER VASCULAR LEFT GREATER SAPH AK SPONT: NORMAL
BH CV LOWER VASCULAR LEFT GREATER SAPH BK COMPRESS: NORMAL
BH CV LOWER VASCULAR LEFT LESSER SAPH COMPRESS: NORMAL
BH CV LOWER VASCULAR LEFT MID FEMORAL AUGMENT: NORMAL
BH CV LOWER VASCULAR LEFT MID FEMORAL COMPETENT: NORMAL
BH CV LOWER VASCULAR LEFT MID FEMORAL COMPRESS: NORMAL
BH CV LOWER VASCULAR LEFT MID FEMORAL PHASIC: NORMAL
BH CV LOWER VASCULAR LEFT MID FEMORAL SPONT: NORMAL
BH CV LOWER VASCULAR LEFT PERONEAL AUGMENT: NORMAL
BH CV LOWER VASCULAR LEFT PERONEAL COMPETENT: NORMAL
BH CV LOWER VASCULAR LEFT PERONEAL COMPRESS: NORMAL
BH CV LOWER VASCULAR LEFT PERONEAL PHASIC: NORMAL
BH CV LOWER VASCULAR LEFT PERONEAL SPONT: NORMAL
BH CV LOWER VASCULAR LEFT POPLITEAL AUGMENT: NORMAL
BH CV LOWER VASCULAR LEFT POPLITEAL COMPETENT: NORMAL
BH CV LOWER VASCULAR LEFT POPLITEAL COMPRESS: NORMAL
BH CV LOWER VASCULAR LEFT POPLITEAL PHASIC: NORMAL
BH CV LOWER VASCULAR LEFT POPLITEAL SPONT: NORMAL
BH CV LOWER VASCULAR LEFT POSTERIOR TIBIAL AUGMENT: NORMAL
BH CV LOWER VASCULAR LEFT POSTERIOR TIBIAL COMPETENT: NORMAL
BH CV LOWER VASCULAR LEFT POSTERIOR TIBIAL COMPRESS: NORMAL
BH CV LOWER VASCULAR LEFT POSTERIOR TIBIAL PHASIC: NORMAL
BH CV LOWER VASCULAR LEFT POSTERIOR TIBIAL SPONT: NORMAL
BH CV LOWER VASCULAR LEFT PROXIMAL FEMORAL COMPRESS: NORMAL
BH CV LOWER VASCULAR RIGHT COMMON FEMORAL AUGMENT: NORMAL
BH CV LOWER VASCULAR RIGHT COMMON FEMORAL COMPETENT: NORMAL
BH CV LOWER VASCULAR RIGHT COMMON FEMORAL COMPRESS: NORMAL
BH CV LOWER VASCULAR RIGHT COMMON FEMORAL PHASIC: NORMAL
BH CV LOWER VASCULAR RIGHT COMMON FEMORAL SPONT: NORMAL
DEPRECATED RDW RBC AUTO: 56.3 FL (ref 37–54)
EOSINOPHIL # BLD AUTO: 0.14 10*3/MM3 (ref 0–0.4)
EOSINOPHIL NFR BLD AUTO: 2.6 % (ref 0.3–6.2)
ERYTHROCYTE [DISTWIDTH] IN BLOOD BY AUTOMATED COUNT: 15.5 % (ref 12.3–15.4)
FERRITIN SERPL-MCNC: 1048 NG/ML (ref 13–150)
HBV SURFACE AB SER RIA-ACNC: NORMAL
HBV SURFACE AG SERPL QL IA: NORMAL
HCT VFR BLD AUTO: 30.2 % (ref 34–46.6)
HGB BLD-MCNC: 8.5 G/DL (ref 12–15.9)
IMM GRANULOCYTES # BLD AUTO: 0.02 10*3/MM3 (ref 0–0.05)
IMM GRANULOCYTES NFR BLD AUTO: 0.4 % (ref 0–0.5)
INR PPP: 1.07 (ref 0.86–1.15)
IRON 24H UR-MRATE: 41 MCG/DL (ref 37–145)
IRON SATN MFR SERPL: 17 % (ref 20–50)
LYMPHOCYTES # BLD AUTO: 0.49 10*3/MM3 (ref 0.7–3.1)
LYMPHOCYTES NFR BLD AUTO: 9 % (ref 19.6–45.3)
MCH RBC QN AUTO: 30.8 PG (ref 26.6–33)
MCHC RBC AUTO-ENTMCNC: 28.1 G/DL (ref 31.5–35.7)
MCV RBC AUTO: 109.4 FL (ref 79–97)
MONOCYTES # BLD AUTO: 0.63 10*3/MM3 (ref 0.1–0.9)
MONOCYTES NFR BLD AUTO: 11.6 % (ref 5–12)
NEUTROPHILS NFR BLD AUTO: 4.14 10*3/MM3 (ref 1.7–7)
NEUTROPHILS NFR BLD AUTO: 76 % (ref 42.7–76)
NRBC BLD AUTO-RTO: 0 /100 WBC (ref 0–0.2)
PLATELET # BLD AUTO: 127 10*3/MM3 (ref 140–450)
PMV BLD AUTO: 9.9 FL (ref 6–12)
PROTHROMBIN TIME: 14.1 SECONDS (ref 11.8–14.9)
RBC # BLD AUTO: 2.76 10*6/MM3 (ref 3.77–5.28)
TIBC SERPL-MCNC: 235 MCG/DL (ref 298–536)
TRANSFERRIN SERPL-MCNC: 158 MG/DL (ref 200–360)
WBC NRBC COR # BLD AUTO: 5.44 10*3/MM3 (ref 3.4–10.8)

## 2025-01-27 PROCEDURE — 94640 AIRWAY INHALATION TREATMENT: CPT

## 2025-01-27 PROCEDURE — 94799 UNLISTED PULMONARY SVC/PX: CPT

## 2025-01-27 PROCEDURE — 85025 COMPLETE CBC W/AUTO DIFF WBC: CPT | Performed by: SURGERY

## 2025-01-27 PROCEDURE — 25010000002 HEPARIN (PORCINE) PER 1000 UNITS: Performed by: INTERNAL MEDICINE

## 2025-01-27 PROCEDURE — 93971 EXTREMITY STUDY: CPT | Performed by: SURGERY

## 2025-01-27 PROCEDURE — 86901 BLOOD TYPING SEROLOGIC RH(D): CPT

## 2025-01-27 PROCEDURE — 73502 X-RAY EXAM HIP UNI 2-3 VIEWS: CPT

## 2025-01-27 PROCEDURE — 85610 PROTHROMBIN TIME: CPT | Performed by: SURGERY

## 2025-01-27 PROCEDURE — 99214 OFFICE O/P EST MOD 30 MIN: CPT | Performed by: SURGERY

## 2025-01-27 PROCEDURE — G0378 HOSPITAL OBSERVATION PER HR: HCPCS

## 2025-01-27 PROCEDURE — 71250 CT THORAX DX C-: CPT

## 2025-01-27 PROCEDURE — 86706 HEP B SURFACE ANTIBODY: CPT | Performed by: INTERNAL MEDICINE

## 2025-01-27 PROCEDURE — 25010000002 HYDROMORPHONE 1 MG/ML SOLUTION: Performed by: STUDENT IN AN ORGANIZED HEALTH CARE EDUCATION/TRAINING PROGRAM

## 2025-01-27 PROCEDURE — 85730 THROMBOPLASTIN TIME PARTIAL: CPT | Performed by: SURGERY

## 2025-01-27 PROCEDURE — 75635 CT ANGIO ABDOMINAL ARTERIES: CPT

## 2025-01-27 PROCEDURE — 86704 HEP B CORE ANTIBODY TOTAL: CPT | Performed by: INTERNAL MEDICINE

## 2025-01-27 PROCEDURE — 25510000001 IOPAMIDOL PER 1 ML: Performed by: STUDENT IN AN ORGANIZED HEALTH CARE EDUCATION/TRAINING PROGRAM

## 2025-01-27 PROCEDURE — 93922 UPR/L XTREMITY ART 2 LEVELS: CPT

## 2025-01-27 PROCEDURE — 93922 UPR/L XTREMITY ART 2 LEVELS: CPT | Performed by: SURGERY

## 2025-01-27 PROCEDURE — 25010000002 ONDANSETRON PER 1 MG: Performed by: EMERGENCY MEDICINE

## 2025-01-27 PROCEDURE — 86900 BLOOD TYPING SEROLOGIC ABO: CPT

## 2025-01-27 PROCEDURE — 93971 EXTREMITY STUDY: CPT

## 2025-01-27 PROCEDURE — 25010000002 HEPARIN (PORCINE) PER 1000 UNITS: Performed by: STUDENT IN AN ORGANIZED HEALTH CARE EDUCATION/TRAINING PROGRAM

## 2025-01-27 PROCEDURE — 85730 THROMBOPLASTIN TIME PARTIAL: CPT | Performed by: STUDENT IN AN ORGANIZED HEALTH CARE EDUCATION/TRAINING PROGRAM

## 2025-01-27 PROCEDURE — 25010000002 HEPARIN (PORCINE) 25000-0.45 UT/250ML-% SOLUTION: Performed by: SURGERY

## 2025-01-27 PROCEDURE — 87340 HEPATITIS B SURFACE AG IA: CPT | Performed by: INTERNAL MEDICINE

## 2025-01-27 PROCEDURE — 99223 1ST HOSP IP/OBS HIGH 75: CPT | Performed by: INTERNAL MEDICINE

## 2025-01-27 PROCEDURE — 94664 DEMO&/EVAL PT USE INHALER: CPT

## 2025-01-27 PROCEDURE — 99222 1ST HOSP IP/OBS MODERATE 55: CPT | Performed by: STUDENT IN AN ORGANIZED HEALTH CARE EDUCATION/TRAINING PROGRAM

## 2025-01-27 PROCEDURE — 25010000002 MORPHINE PER 10 MG: Performed by: EMERGENCY MEDICINE

## 2025-01-27 RX ORDER — ACETAMINOPHEN 325 MG/1
650 TABLET ORAL EVERY 6 HOURS PRN
Status: DISCONTINUED | OUTPATIENT
Start: 2025-01-27 | End: 2025-01-29

## 2025-01-27 RX ORDER — HEPARIN SODIUM 1000 [USP'U]/ML
3200 INJECTION, SOLUTION INTRAVENOUS; SUBCUTANEOUS AS NEEDED
Status: DISCONTINUED | OUTPATIENT
Start: 2025-01-27 | End: 2025-02-01 | Stop reason: HOSPADM

## 2025-01-27 RX ORDER — IPRATROPIUM BROMIDE AND ALBUTEROL SULFATE 2.5; .5 MG/3ML; MG/3ML
3 SOLUTION RESPIRATORY (INHALATION)
Status: DISCONTINUED | OUTPATIENT
Start: 2025-01-27 | End: 2025-02-01 | Stop reason: HOSPADM

## 2025-01-27 RX ORDER — NITROGLYCERIN 0.4 MG/1
0.4 TABLET SUBLINGUAL
Status: DISCONTINUED | OUTPATIENT
Start: 2025-01-27 | End: 2025-01-29

## 2025-01-27 RX ORDER — SODIUM CHLORIDE 0.9 % (FLUSH) 0.9 %
10 SYRINGE (ML) INJECTION EVERY 12 HOURS SCHEDULED
Status: DISCONTINUED | OUTPATIENT
Start: 2025-01-27 | End: 2025-02-01 | Stop reason: HOSPADM

## 2025-01-27 RX ORDER — HEPARIN SODIUM 5000 [USP'U]/ML
5000 INJECTION, SOLUTION INTRAVENOUS; SUBCUTANEOUS EVERY 12 HOURS SCHEDULED
Status: DISCONTINUED | OUTPATIENT
Start: 2025-01-27 | End: 2025-01-27

## 2025-01-27 RX ORDER — ROSUVASTATIN CALCIUM 20 MG/1
20 TABLET, COATED ORAL NIGHTLY
Status: DISCONTINUED | OUTPATIENT
Start: 2025-01-27 | End: 2025-02-01 | Stop reason: HOSPADM

## 2025-01-27 RX ORDER — SODIUM CHLORIDE 0.9 % (FLUSH) 0.9 %
10 SYRINGE (ML) INJECTION AS NEEDED
Status: DISCONTINUED | OUTPATIENT
Start: 2025-01-27 | End: 2025-02-01 | Stop reason: HOSPADM

## 2025-01-27 RX ORDER — FERROUS SULFATE 325(65) MG
325 TABLET ORAL
COMMUNITY

## 2025-01-27 RX ORDER — ALUMINA, MAGNESIA, AND SIMETHICONE 2400; 2400; 240 MG/30ML; MG/30ML; MG/30ML
15 SUSPENSION ORAL EVERY 6 HOURS PRN
Status: DISCONTINUED | OUTPATIENT
Start: 2025-01-27 | End: 2025-02-01 | Stop reason: HOSPADM

## 2025-01-27 RX ORDER — LEVOTHYROXINE SODIUM 50 UG/1
50 TABLET ORAL
Status: DISCONTINUED | OUTPATIENT
Start: 2025-01-28 | End: 2025-02-01 | Stop reason: HOSPADM

## 2025-01-27 RX ORDER — POLYETHYLENE GLYCOL 3350 17 G/17G
17 POWDER, FOR SOLUTION ORAL DAILY PRN
Status: DISCONTINUED | OUTPATIENT
Start: 2025-01-27 | End: 2025-02-01 | Stop reason: HOSPADM

## 2025-01-27 RX ORDER — IOPAMIDOL 755 MG/ML
100 INJECTION, SOLUTION INTRAVASCULAR
Status: COMPLETED | OUTPATIENT
Start: 2025-01-27 | End: 2025-01-27

## 2025-01-27 RX ORDER — AMOXICILLIN 250 MG
2 CAPSULE ORAL 2 TIMES DAILY PRN
Status: DISCONTINUED | OUTPATIENT
Start: 2025-01-27 | End: 2025-02-01 | Stop reason: HOSPADM

## 2025-01-27 RX ORDER — FLUTICASONE PROPIONATE 50 MCG
1 SPRAY, SUSPENSION (ML) NASAL DAILY
Status: DISCONTINUED | OUTPATIENT
Start: 2025-01-27 | End: 2025-02-01 | Stop reason: HOSPADM

## 2025-01-27 RX ORDER — BISACODYL 5 MG/1
5 TABLET, DELAYED RELEASE ORAL DAILY PRN
Status: DISCONTINUED | OUTPATIENT
Start: 2025-01-27 | End: 2025-02-01 | Stop reason: HOSPADM

## 2025-01-27 RX ORDER — ONDANSETRON 2 MG/ML
4 INJECTION INTRAMUSCULAR; INTRAVENOUS ONCE
Status: COMPLETED | OUTPATIENT
Start: 2025-01-27 | End: 2025-01-27

## 2025-01-27 RX ORDER — BISACODYL 10 MG
10 SUPPOSITORY, RECTAL RECTAL DAILY PRN
Status: DISCONTINUED | OUTPATIENT
Start: 2025-01-27 | End: 2025-02-01 | Stop reason: HOSPADM

## 2025-01-27 RX ORDER — HEPARIN SODIUM 10000 [USP'U]/100ML
12 INJECTION, SOLUTION INTRAVENOUS
Status: DISCONTINUED | OUTPATIENT
Start: 2025-01-27 | End: 2025-01-28

## 2025-01-27 RX ORDER — SODIUM CHLORIDE 9 MG/ML
40 INJECTION, SOLUTION INTRAVENOUS AS NEEDED
Status: DISCONTINUED | OUTPATIENT
Start: 2025-01-27 | End: 2025-02-01 | Stop reason: HOSPADM

## 2025-01-27 RX ADMIN — Medication 10 ML: at 09:54

## 2025-01-27 RX ADMIN — HEPARIN SODIUM 5000 UNITS: 5000 INJECTION INTRAVENOUS; SUBCUTANEOUS at 09:55

## 2025-01-27 RX ADMIN — IPRATROPIUM BROMIDE AND ALBUTEROL SULFATE 3 ML: .5; 3 SOLUTION RESPIRATORY (INHALATION) at 21:48

## 2025-01-27 RX ADMIN — ROSUVASTATIN 20 MG: 20 TABLET, FILM COATED ORAL at 20:01

## 2025-01-27 RX ADMIN — HEPARIN SODIUM 3200 UNITS: 1000 INJECTION INTRAVENOUS; SUBCUTANEOUS at 15:25

## 2025-01-27 RX ADMIN — HEPARIN SODIUM 12 UNITS/KG/HR: 10000 INJECTION, SOLUTION INTRAVENOUS at 16:02

## 2025-01-27 RX ADMIN — ONDANSETRON 4 MG: 2 INJECTION INTRAMUSCULAR; INTRAVENOUS at 02:41

## 2025-01-27 RX ADMIN — HYDROMORPHONE HYDROCHLORIDE 0.5 MG: 1 INJECTION, SOLUTION INTRAMUSCULAR; INTRAVENOUS; SUBCUTANEOUS at 14:57

## 2025-01-27 RX ADMIN — Medication 10 ML: at 20:01

## 2025-01-27 RX ADMIN — IPRATROPIUM BROMIDE AND ALBUTEROL SULFATE 3 ML: .5; 3 SOLUTION RESPIRATORY (INHALATION) at 12:44

## 2025-01-27 RX ADMIN — MORPHINE SULFATE 4 MG: 4 INJECTION, SOLUTION INTRAMUSCULAR; INTRAVENOUS at 03:15

## 2025-01-27 RX ADMIN — IOPAMIDOL 100 ML: 755 INJECTION, SOLUTION INTRAVENOUS at 11:24

## 2025-01-27 RX ADMIN — HYDROMORPHONE HYDROCHLORIDE 0.5 MG: 1 INJECTION, SOLUTION INTRAMUSCULAR; INTRAVENOUS; SUBCUTANEOUS at 09:55

## 2025-01-27 NOTE — ED PROVIDER NOTES
Time: 11:29 PM EST  Date of encounter:  1/26/2025  Independent Historian/Clinical History and Information was obtained by:   Patient and Family    History is limited by: N/A    Chief Complaint: Left leg pain and swelling      History of Present Illness:  Patient is a 89 y.o. year old female who presents to the emergency department for evaluation of acute left leg swelling x 2 days.  Patient has a prior medical history of CKD stage IV currently on Monday Wednesday Friday dialysis, CHF, hypothyroidism, aortic stenosis.  Patient reports she consistently has bilateral lower extremity edema edema and swelling, however, her left leg became acutely swollen a couple days ago along with severe pain when ambulating.  Reports that she has not been able to walk on that leg since the swelling occur.  Denies any history of DVT or PE.  Denies any chest pain, shortness of breath, hemoptysis.      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 DENIES CP BUT DOES HAVE SOA CHRONIC ISSUE COPD/LUNG CANCER    COPD (chronic obstructive pulmonary disease)     FOLLOWED BY DR MARIAJOSE BAILEY    Coronary artery disease     DENIES CP BUT DOES GET SOA MOST OF THE TIME WITH EXERTION BUT OCC AT REST CHRONIC ISSUE COPD/LUNG CANCER    Deep vein thrombosis     Diabetes mellitus      DOES NOT CHECK BS DAILY    Disease of thyroid gland     HYPOTHYROIDISM    Essential hypertension     Gastric ulcer     GERD (gastroesophageal reflux disease)     Heart murmur     History of transfusion     NO ISSUES POST TRANSFUSION WAS MANY YEARS AGO    Hyperlipemia     Leukocytopenia     FOLLOWED BY DR MIR BAILEY    Limb swelling     Lumbago     Lumbar spinal stenosis     Lung cancer     Migraine headache     Multiple joint pain     On home oxygen therapy     4L/NC PRN    Osteopenia     PNA (pneumonia) 05/04/2024    Pseudomonas pneumonia 04/29/2024    Reflux esophagitis     Shortness of breath     Thyroid nodule     HAS ULTRASOUND YEARLY BEING MONITORED    Vascular disease     Vitamin D deficiency      Past Surgical History:   Procedure Laterality Date    ABDOMINAL SURGERY      ANGIOPLASTY RENAL ARTERY Left 06/14/2024    stent placement    APPENDECTOMY      ARTERIOGRAM N/A 6/14/2024    Procedure: Arteriogram, right radial access, aortogram and renal arteriogram with possible intervention.;  Surgeon: Dio Miguel MD;  Location: [facility] CATH INVASIVE LOCATION;  Service: Peripheral Vascular;  Laterality: N/A;    ARTERIOVENOUS FISTULA/SHUNT SURGERY Left 05/10/2022    Procedure: Left basilic vein transposition;  Surgeon: Wan Barksdale MD;  Location: [facility] MAIN OR;  Service: Vascular;  Laterality: Left;    ARTERIOVENOUS FISTULA/SHUNT SURGERY Left 03/23/2023    Procedure: Ligation of left arm arteriovenous fistula;  Surgeon: Wan Barksdale MD;  Location: [facility] MAIN OR;  Service: Vascular;  Laterality: Left;    ARTERIOVENOUS FISTULA/SHUNT SURGERY Left 11/30/2023    Procedure: Creation of left arm arteriovenous graft;  Surgeon: Wan Barksdale MD;  Location: [facility] MAIN OR;  Service: Vascular;  Laterality: Left;    BRONCHOSCOPY N/A 04/24/2024    Procedure: BRONCHOSCOPY WITH BAL AND WASHINGS;  Surgeon: Khalif Yanez MD;  Location: [facility] ENDOSCOPY;  Service: Pulmonary;  Laterality: N/A;  MUCUS PLUGGING    CARDIAC  CATHETERIZATION  1996    CARDIAC SURGERY      CARDIAC SURGERY      fluid drained from heart    CATARACT EXTRACTION, BILATERAL  2003    COLONOSCOPY  2014    ENDOSCOPY  2016 2019    FEMORAL ARTERY STENT Bilateral     HYSTERECTOMY      LUNG BIOPSY Left 2005    lobectomy upper lung caner    LUNG VOLUME REDUCTION      OTHER SURGICAL HISTORY      artifical joints/limbs    REPLACEMENT TOTAL KNEE Left 2016    SHUNT O GRAM N/A 1/13/2025    Procedure: dialysis shuntogram;  Surgeon: Court Valente MD;  Location: [facility] INVASIVE LOCATION;  Service: Peripheral Vascular;  Laterality: N/A;    UPPER GASTROINTESTINAL ENDOSCOPY       Family History   Problem Relation Age of Onset    Arthritis Mother     Arthritis Father     Cancer Brother     Diabetes Brother     Other Brother         blood disease     Prostate cancer Brother     Cancer Brother     Prostate cancer Brother     Cancer Brother     Cancer Brother     Malig Hyperthermia Neg Hx     Colon cancer Neg Hx        Home Medications:  Prior to Admission medications    Medication Sig Start Date End Date Taking? Authorizing Provider   acetaminophen (TYLENOL) 325 MG tablet Take 2 tablets by mouth Every 6 (Six) Hours As Needed for Mild Pain.    Provider, MD Sarah   albuterol sulfate  (90 Base) MCG/ACT inhaler Inhale 2 puffs Every 4 (Four) Hours As Needed for Wheezing. 9/12/24   Laura Franco APRN   amLODIPine (NORVASC) 2.5 MG tablet Take 1 tablet by mouth As Needed (Once daily for a systolic blood pressure greater than 150). For a systolic blood pressure greater than 150 8/13/24   Halima Quick APRN   aspirin 81 MG EC tablet Take 1 tablet by mouth Daily.    Provider, MD Sarah   budesonide (Pulmicort) 0.5 MG/2ML nebulizer solution Take 2 mL by nebulization 2 (Two) Times a Day. 8/28/24   Brennan Mosqueda MD   carvedilol (COREG) 25 MG tablet Take 1 tablet by mouth 2 (Two) Times a Day With Meals. 9/24/24   Halima Quick APRN   clopidogrel  (Plavix) 75 MG tablet Take 1 tablet by mouth Daily. 6/15/24 6/15/25  Dio Miguel MD   dexlansoprazole (DEXILANT) 60 MG capsule Take 1 capsule by mouth Daily. 8/5/24   Brennan Mosqueda MD   fluticasone (FLONASE) 50 MCG/ACT nasal spray  9/12/24   Sarah Parker MD   furosemide (LASIX) 20 MG tablet Take 1 tablet by mouth Daily. 6/18/24   Halima Quick APRN   hydrALAZINE (APRESOLINE) 25 MG tablet Take 1 tablet by mouth 2 (Two) Times a Day. 11/5/24   Halima Quick APRN   ipratropium-albuterol (DUO-NEB) 0.5-2.5 mg/3 ml nebulizer INHALE 1 vial (3 ml) per NEBULIZER FOUR TIMES DAILY FOR 30 DAYS 12/30/24   Brennan Mosqueda MD   isosorbide mononitrate (IMDUR) 30 MG 24 hr tablet Take 1 tablet by mouth Every Night. 9/17/24   Jazzy Addison APRN   levocetirizine (XYZAL) 5 MG tablet TAKE 1 TABLET DAILY  Patient taking differently: Take 1 tablet by mouth Every Evening. 6/23/23   Paloma James MD   levothyroxine (SYNTHROID, LEVOTHROID) 25 MCG tablet Take 1 tablet by mouth Daily. 7/8/24   Sarah Parker MD   lidocaine-prilocaine (EMLA) 2.5-2.5 % cream apply to dialysis access site 30-45 minutes prior to dialysis 9/13/24   Sarah Parker MD   losartan (Cozaar) 50 MG tablet Take 1 tablet by mouth 2 (Two) Times a Day. 7/16/24   Brennan Mosqueda MD   nitroglycerin (NITROSTAT) 0.4 MG SL tablet Place 1 tablet under the tongue Every 5 (Five) Minutes As Needed for Chest Pain.    Sarah Parker MD   ondansetron (ZOFRAN) 4 MG tablet Take 1 tablet by mouth Every 8 (Eight) Hours As Needed for Nausea or Vomiting.    Sarah Parker MD   rosuvastatin (CRESTOR) 20 MG tablet Take 1 tablet by mouth Every Night. 9/17/24   Jazzy Addison APRN   sevelamer (RENVELA) 800 MG tablet Take 1 tablet by mouth 3 (Three) Times a Day With Meals. 11/19/24   Brennan Mosqueda MD   tobramycin PF (JUAN) 300 MG/5ML nebulizer solution Take 5 mL by nebulization 2 (Two) Times  "a Day. 30 days on 30 days off 24   Laura Franco APRN   vitamin D (ERGOCALCIFEROL) 1.25 MG (89984 UT) capsule capsule Take 1 capsule by mouth Every 14 (Fourteen) Days. 10/24/24   Brennan Mosqueda MD        Social History:   Social History     Tobacco Use    Smoking status: Former     Current packs/day: 0.00     Average packs/day: 1 pack/day for 52.5 years (52.5 ttl pk-yrs)     Types: Cigarettes     Start date:      Quit date: 2004     Years since quittin.6     Passive exposure: Past    Smokeless tobacco: Never   Vaping Use    Vaping status: Never Used   Substance Use Topics    Alcohol use: Not Currently     Comment: beer many years ago    Drug use: Never         Review of Systems:  Review of Systems   Constitutional:  Positive for activity change. Negative for chills and fever.   HENT:  Negative for congestion, ear pain and sore throat.    Eyes:  Negative for pain.   Respiratory:  Negative for cough, chest tightness and shortness of breath.    Cardiovascular:  Positive for leg swelling. Negative for chest pain and palpitations.   Gastrointestinal:  Negative for abdominal pain, diarrhea, nausea and vomiting.   Genitourinary:  Negative for flank pain and hematuria.   Musculoskeletal:  Positive for arthralgias, gait problem, joint swelling and myalgias. Negative for back pain, neck pain and neck stiffness.   Skin:  Positive for color change and rash. Negative for pallor and wound.   Neurological:  Positive for weakness. Negative for seizures and headaches.   All other systems reviewed and are negative.       Physical Exam:  /51   Pulse 77   Temp 97.7 °F (36.5 °C) (Oral)   Resp 20   Ht 162.6 cm (64\")   Wt 66.6 kg (146 lb 13.2 oz)   LMP  (LMP Unknown)   SpO2 100%   BMI 25.20 kg/m²     Physical Exam  Vitals and nursing note reviewed.   Constitutional:       General: She is not in acute distress.     Appearance: Normal appearance. She is not ill-appearing or toxic-appearing. "   HENT:      Head: Normocephalic and atraumatic.   Eyes:      General: No scleral icterus.  Cardiovascular:      Rate and Rhythm: Normal rate and regular rhythm.      Pulses: Normal pulses.   Pulmonary:      Effort: Pulmonary effort is normal. No respiratory distress.   Abdominal:      General: Abdomen is flat. There is no distension.   Musculoskeletal:         General: Swelling and tenderness present. No deformity or signs of injury. Normal range of motion.      Cervical back: Normal range of motion.      Right lower leg: Edema (Mild, chronic) present.      Left lower leg: Edema (Mild, chronic worse the last 2 days.) present.      Comments: Evidence of left greater than right leg edema and swelling.  2+ bilateral pitting edema present.  Cap refill equal bilaterally at 2 to 3 seconds.  No appreciable palpable cords   Skin:     General: Skin is warm and dry.      Capillary Refill: Capillary refill takes less than 2 seconds.      Findings: No bruising, erythema or rash.   Neurological:      General: No focal deficit present.      Mental Status: She is alert and oriented to person, place, and time. Mental status is at baseline.   Psychiatric:         Mood and Affect: Mood normal.         Behavior: Behavior normal.                    Medical Decision Making:      Comorbidities that affect care:    Chronic Kidney Disease    External Notes reviewed:    Previous Clinic Note: Previous clinic note on      The following orders were placed and all results were independently analyzed by me:  Orders Placed This Encounter   Procedures    XR Hip With or Without Pelvis 2 - 3 View Left    David Draw    Comprehensive Metabolic Panel    Protime-INR    aPTT    CBC Auto Differential    Scan Slide    Ambulate Patient - Report tolerance to provider    Code Status and Medical Interventions: CPR (Attempt to Resuscitate); Full Support    Inpatient Hospitalist Consult    Initiate Observation Status    Green Top (Gel)    Lavender Top    Gold  Top - SST    Light Blue Top    CBC & Differential       Medications Given in the Emergency Department:  Medications   acetaminophen (TYLENOL) tablet 500 mg (500 mg Oral Given 1/26/25 2348)   morphine injection 4 mg (4 mg Intravenous Given 1/27/25 0315)   ondansetron (ZOFRAN) injection 4 mg (4 mg Intravenous Given 1/27/25 0241)        ED Course:    ED Course as of 01/27/25 0622 Mon Jan 27, 2025 0233 I went and discussed the treatment plan with the patient and her family.  I advised them that we did not have any vascular techs on call in at night and that she had been offered to go home and have them call today to set them up for an outpatient vascular study.  Patient states that they did not think they can get her in and out of the house tonight and would prefer to use stay for the study in the morning.  I did advise them that that the study had been put in and it back vascular services come in around 7 AM and that they would work them in this morning to get the vascular study done. [TC]   0502 I spoke with Dr. Barber and he will admit the patient.  I did let him know that the patient was a Monday Wednesday Friday dialysis patient.  He did request that the patient have a left hip and pelvis x-ray to ensure that there has not been any pathological fractures.  The patient was made aware of the pending admission.   [TC]      ED Course User Index  [TC] Karina Noriega APRN       Labs:    Lab Results (last 24 hours)       Procedure Component Value Units Date/Time    CBC & Differential [[id number]]  (Abnormal) Collected: 01/26/25 2204    Specimen: Blood Updated: 01/26/25 2339    Narrative:      The following orders were created for panel order CBC & Differential.  Procedure                               Abnormality         Status                     ---------                               -----------         ------                     CBC Auto Differential[[id number]]        Abnormal            Final result                Scan Slide[[id number]]                                       Final result                 Please view results for these tests on the individual orders.    Comprehensive Metabolic Panel [[id number]]  (Abnormal) Collected: 01/26/25 2204    Specimen: Blood Updated: 01/26/25 2349     Glucose 188 mg/dL      BUN 34 mg/dL      Creatinine 4.70 mg/dL      Sodium 145 mmol/L      Potassium 5.2 mmol/L      Comment: Slight hemolysis detected by analyzer. Result may be falsely elevated.        Chloride 105 mmol/L      CO2 28.8 mmol/L      Calcium 7.8 mg/dL      Total Protein 5.8 g/dL      Albumin 3.3 g/dL      ALT (SGPT) 14 U/L      AST (SGOT) 25 U/L      Alkaline Phosphatase 292 U/L      Total Bilirubin 0.2 mg/dL      Globulin 2.5 gm/dL      A/G Ratio 1.3 g/dL      BUN/Creatinine Ratio 7.2     Anion Gap 11.2 mmol/L      eGFR 8.5 mL/min/1.73     Narrative:      GFR Categories in Chronic Kidney Disease (CKD)      GFR Category          GFR (mL/min/1.73)    Interpretation  G1                     90 or greater         Normal or high (1)  G2                      60-89                Mild decrease (1)  G3a                   45-59                Mild to moderate decrease  G3b                   30-44                Moderate to severe decrease  G4                    15-29                Severe decrease  G5                    14 or less           Kidney failure          (1)In the absence of evidence of kidney disease, neither GFR category G1 or G2 fulfill the criteria for CKD.    eGFR calculation 2021 CKD-EPI creatinine equation, which does not include race as a factor    CBC Auto Differential [[id number]]  (Abnormal) Collected: 01/26/25 2204    Specimen: Blood Updated: 01/26/25 2339     WBC 3.90 10*3/mm3      RBC 2.42 10*6/mm3      Hemoglobin 7.5 g/dL      Hematocrit 26.5 %      .5 fL      MCH 31.0 pg      MCHC 28.3 g/dL      RDW 15.1 %      RDW-SD 56.5 fl      MPV 10.7 fL      Platelets 116 10*3/mm3      Neutrophil % 60.2 %       Lymphocyte % 16.7 %      Monocyte % 18.5 %      Eosinophil % 3.8 %      Basophil % 0.5 %      Immature Grans % 0.3 %      Neutrophils, Absolute 2.35 10*3/mm3      Lymphocytes, Absolute 0.65 10*3/mm3      Monocytes, Absolute 0.72 10*3/mm3      Eosinophils, Absolute 0.15 10*3/mm3      Basophils, Absolute 0.02 10*3/mm3      Immature Grans, Absolute 0.01 10*3/mm3      nRBC 0.0 /100 WBC     Narrative:      Appended report. These results have been appended to a previously verified report.    Protime-INR [[id number]]  (Abnormal) Collected: 01/26/25 2204    Specimen: Blood Updated: 01/26/25 2323     Protime 15.4 Seconds      INR 1.20    Narrative:      Suggested Therapeutic Ranges For Oral Anticoagulant Therapy:  Level of Therapy                      INR Target Range  Standard Dose                            2.0-3.0  High Dose                                2.5-3.5  Patients not receiving anticoagulant  Therapy Normal Range                     0.86-1.15    aPTT [[id number]]  (Normal) Collected: 01/26/25 2204    Specimen: Blood Updated: 01/26/25 2323     PTT 32.0 seconds     Scan Slide [[id number]] Collected: 01/26/25 2204    Specimen: Blood Updated: 01/26/25 2339     Anisocytosis Slight/1+     Polychromasia Slight/1+     WBC Morphology Normal     Platelet Estimate Decreased             Imaging:    XR Hip With or Without Pelvis 2 - 3 View Left    Result Date: 1/27/2025  XR HIP W OR WO PELVIS 2-3 VIEW LEFT-  Date of exam: 1/27/2025, 5:05 a.m.  Indications: pain/cant bare weight; r60.0-localized edema; n18.6-end stage renal disease  Comparison: 11/10/2023.  FINDINGS: Three (3) views were obtained. No acute fracture or acute malalignment is identified. External artifacts obscure detail. Degenerative changes involve the bilateral hip joints, greater on the right than the left. There is generalized osteopenia. The other incidental chronic findings are as described in the prior exam report. If symptoms or clinical concerns persist,  consider imaging follow-up.       No acute fracture or acute malalignment is identified.   Portions of this note were completed with a voice recognition program.   Electronically Signed By-Chicho Jones MD On:1/27/2025 5:15 AM         Differential Diagnosis and Discussion:    Edema: Based on the patient's history, signs, and symptoms, the differential diagnosis includes but is not limited to heart failure, kidney disease, liver disease, venous insufficiency, lymphedema, pregnancy, medications, allergic reactions.  Extremity Pain: Differential diagnosis includes but is not limited to soft tissue sprain, tendonitis, tendon injury, dislocation, fracture, deep vein thrombosis, arterial insufficiency, osteoarthritis, bursitis, and ligamentous damage.    PROCEDURES:    Labs were collected in the emergency department and all labs were reviewed and interpreted by me.    No orders to display       Procedures    MDM  Number of Diagnoses or Management Options  Acute leg pain, left: new and requires workup  Edema of left lower extremity: established and worsening  ESRD (end stage renal disease): established and worsening  Unable to ambulate: new and requires workup     Amount and/or Complexity of Data Reviewed  Clinical lab tests: reviewed  Decide to obtain previous medical records or to obtain history from someone other than the patient: yes    Risk of Complications, Morbidity, and/or Mortality  Presenting problems: low  Diagnostic procedures: low  Management options: low    Patient Progress  Patient progress: stable       Patient Care Considerations:    ANTIBIOTICS: I considered prescribing antibiotics as an outpatient however no bacterial focus of infection was found.      Consultants/Shared Management Plan:    Hospitalist: I have discussed the case with Dr. Barber who agrees to accept the patient for admission.    Social Determinants of Health:    Patient has presented with family members who are responsible, reliable and  will ensure follow up care.      Disposition and Care Coordination:    Admit:   Through independent evaluation of the patient's history, physical, and imperical data, the patient meets criteria for inpatient admission to the hospital.      Final diagnoses:   Edema of left lower extremity   ESRD (end stage renal disease)   Acute leg pain, left   Unable to ambulate        ED Disposition       ED Disposition   Decision to Admit    Condition   --    Comment   Level of Care: Remote Telemetry [26]   Diagnosis: Weakness [[id number]]   Admitting Physician: LINDSEY GOOD [[id number]]   Attending Physician: LINDSEY GOOD [[id number]]                 This medical record created using voice recognition software.             Karina Noriega, APRN  01/27/25 0622

## 2025-01-27 NOTE — CONSULTS
[facility]   Consult Note    Patient Name: Charlette Rai  : 1935  MRN: [id number]  Primary Care Physician:  Brennan Mosqueda MD  Referring Physician: No Known Provider  Date of admission: 2025    Inpatient Pulmonology Consult  Consult performed by: Regis Holcomb DO  Consult ordered by: Xander Plaza MD        Subjective   Subjective     Reason for Consult/ Chief Complaint: Reason for consultation abnormal imaging of the chest    History of Present Illness  Charlette Rai is a 89 y.o. female she has history of lung cancer status posttreatment back in   Has history of ESRD  Underwent a CT scan of the abdomen pelvis which showed a new right lower lobe nodule or density concerning for possible malignancy  Has had weakness  This lesion was not present on CT scan in 2024  Review of Systems     Personal History     Past Medical History:   Diagnosis Date    Allergic rhinitis     Anaphylactic shock, unspecified, initial encounter 2023    Anemia     Arthritis     Asthma     USE INHALERS AND NEBULIZERS    Back pain     Bladder disorder     Cancer     LEFT LUNG CANCER  SURGERY AND CHEMO DONE  AND CURRENTLY   RIGHT LUNG CANCER HAS ONLY RECEIVED RADIATION THUS FAR    Chronic kidney disease     stage 3    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 DENIES CP BUT DOES HAVE SOA CHRONIC ISSUE COPD/LUNG CANCER    COPD (chronic obstructive pulmonary disease)     FOLLOWED BY DR MARIAJOSE BAILEY    Coronary artery disease     DENIES CP BUT DOES GET SOA MOST OF THE TIME WITH EXERTION BUT OCC AT REST CHRONIC ISSUE COPD/LUNG CANCER    Deep vein thrombosis     Diabetes mellitus     DOES NOT CHECK BS DAILY    Disease of thyroid gland     HYPOTHYROIDISM    Essential hypertension     Gastric ulcer     GERD  (gastroesophageal reflux disease)     Heart murmur     History of transfusion     NO ISSUES POST TRANSFUSION WAS MANY YEARS AGO    Hyperlipemia     Leukocytopenia     FOLLOWED BY DR MIR BAILEY    Limb swelling     Lumbago     Lumbar spinal stenosis     Lung cancer     Migraine headache     Multiple joint pain     On home oxygen therapy     4L/NC PRN    Osteopenia     PNA (pneumonia) 05/04/2024    Pseudomonas pneumonia 04/29/2024    Reflux esophagitis     Shortness of breath     Thyroid nodule     HAS ULTRASOUND YEARLY BEING MONITORED    Vascular disease     Vitamin D deficiency        Past Surgical History:   Procedure Laterality Date    ABDOMINAL SURGERY      ANGIOPLASTY RENAL ARTERY Left 06/14/2024    stent placement    APPENDECTOMY      ARTERIOGRAM N/A 6/14/2024    Procedure: Arteriogram, right radial access, aortogram and renal arteriogram with possible intervention.;  Surgeon: Dio Miguel MD;  Location: [facility] CATH INVASIVE LOCATION;  Service: Peripheral Vascular;  Laterality: N/A;    ARTERIOVENOUS FISTULA/SHUNT SURGERY Left 05/10/2022    Procedure: Left basilic vein transposition;  Surgeon: Wan Barksdale MD;  Location: [facility] MAIN OR;  Service: Vascular;  Laterality: Left;    ARTERIOVENOUS FISTULA/SHUNT SURGERY Left 03/23/2023    Procedure: Ligation of left arm arteriovenous fistula;  Surgeon: Wan Barksdale MD;  Location: [facility] MAIN OR;  Service: Vascular;  Laterality: Left;    ARTERIOVENOUS FISTULA/SHUNT SURGERY Left 11/30/2023    Procedure: Creation of left arm arteriovenous graft;  Surgeon: Wan Barksdale MD;  Location: [facility] MAIN OR;  Service: Vascular;  Laterality: Left;    BRONCHOSCOPY N/A 04/24/2024    Procedure: BRONCHOSCOPY WITH BAL AND WASHINGS;  Surgeon: Khalif Yanez MD;  Location: [facility] ENDOSCOPY;  Service: Pulmonary;  Laterality: N/A;  MUCUS PLUGGING    CARDIAC CATHETERIZATION  1996    CARDIAC SURGERY      CARDIAC SURGERY      fluid drained from heart    CATARACT EXTRACTION, BILATERAL   2003    COLONOSCOPY  2014    ENDOSCOPY  2016 2019    FEMORAL ARTERY STENT Bilateral     HYSTERECTOMY      LUNG BIOPSY Left 2005    lobectomy upper lung caner    LUNG VOLUME REDUCTION      OTHER SURGICAL HISTORY      artifical joints/limbs    REPLACEMENT TOTAL KNEE Left 2016    SHUNT O GRAM N/A 1/13/2025    Procedure: dialysis shuntogram;  Surgeon: Court Valente MD;  Location: [facility] INVASIVE LOCATION;  Service: Peripheral Vascular;  Laterality: N/A;    UPPER GASTROINTESTINAL ENDOSCOPY         Family History: family history includes Arthritis in her father and mother; Cancer in her brother, brother, brother, and brother; Diabetes in her brother; Other in her brother; Prostate cancer in her brother and brother. Otherwise pertinent FHx was reviewed and not pertinent to current issue.    Social History:  reports that she quit smoking about 20 years ago. Her smoking use included cigarettes. She started smoking about 73 years ago. She has a 52.5 pack-year smoking history. She has been exposed to tobacco smoke. She has never used smokeless tobacco. She reports that she does not currently use alcohol. She reports that she does not use drugs.    Home Medications:   Psyllium, acetaminophen, albuterol sulfate HFA, amLODIPine, aspirin, budesonide, carvedilol, clopidogrel, dexlansoprazole, ferrous sulfate, fluticasone, furosemide, hydrALAZINE, ipratropium-albuterol, isosorbide mononitrate, levocetirizine, levothyroxine, lidocaine-prilocaine, losartan, rosuvastatin, sevelamer, tobramycin PF, and vitamin D    Allergies:  No Known Allergies    Objective    Objective     Vitals:  Temp:  [97.3 °F (36.3 °C)-98.1 °F (36.7 °C)] 98.1 °F (36.7 °C)  Heart Rate:  [68-86] 78  Resp:  [14-20] 14  BP: ()/(42-69) 74/42  Flow (L/min) (Oxygen Therapy):  [4] 4    Physical Exam  Elderly female  Currently on dialysis  Resting comfortably  In no acute distress  Result Review    Result Review:  I have personally reviewed the results  from the time of this admission to 1/27/2025 17:12 EST and agree with these findings:  [x]  Laboratory  [x]  Microbiology  [x]  Radiology  [x]  EKG/Telemetry   [x]  Cardiology/Vascular   [x]  Pathology  [x]  Old records  []  Other:    Most notable findings include: CT scan of the abdomen pelvis showing a densely consolidated right lower lobe lesion concerning for possible malignancy    Assessment & Plan   Assessment / Plan     Brief Patient Summary:  Charlette Rai is a 89 y.o. female who admitted to the hospital found to have concerning pulmonary nodule    Active Hospital Problems:  Active Hospital Problems    Diagnosis     **Weakness     Peripheral vascular disease of lower extremity        Plan:   I reviewed back old CT scans dating back until April 2024  I do not see any evidence of this lesion there  Do not see any evidence of this lesion back from November 2024  I am concerned that this could represent bronchogenic carcinoma given her risk factors and her history of cancer in the past  Explained to the patient this time we will proceed CT scan of the chest without contrast with navigational protocol  Ultimately given the location of lesion in the patient's overall comorbid condition she may be a better suited candidate for IR guided transthoracic biopsy  Will obtain CT scan of the chest without contrast and will evaluate and make that decision based upon results of the CT  From a pulmonary standpoint do not see any evidence of any infectious process  Continue with as needed bronchodilator therapies  Further workup will be decided upon the CT scan of the chest    I personally reviewed all imaging, laboratory data, and I spoke with respiratory therapy, and nursing regarding the patient's care, have also spoken with the patient's primary admitting physician regarding his plan of care.    Electronically signed by Regis Holcomb DO, 01/27/25, 5:12 PM EST.

## 2025-01-27 NOTE — CONSULTS
[facility]   VASCULAR SURGERY CONSULT    Patient Name: Charlette Rai  : 1935  MRN: [id number]  Primary Care Physician:  Brennan Mosqueda MD  Date of admission: 2025    Subjective   Subjective     Chief Complaint: Left lower extremity swelling from the knee up to the level of the thigh with inability to bear weight    HPI:    Charlette Rai is a 89 y.o. female with known chronic peripheral arterial disease and bilateral SFA occlusions dating back to CT angiogram in  which are stable and unchanged on her most recent CT angiogram performed today.  She initially presented to the hospital after she noticed some increased swelling below her left knee which progressively worsened and progressed up to the level of her hip.  Over that same time she developed worsening weakness and ultimately was unable to bear weight on the left lower extremity from pain as such she presented to the emergency department for further evaluation.  Of note she has a history of end-stage renal disease on hemodialysis  and currently being dialyzed via tunneled dialysis catheter, peripheral arterial disease with the above-noted bilateral chronic SFA occlusions with reconstitution via longstanding collaterals, bilateral common iliac and left external iliac artery stents which are patent and left renal artery angioplasty and stenting which is patent.  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  She underwent a left lower extremity venous duplex which showed no evidence of DVT however the superficial femoral artery occlusion was noted and as such vascular surgery was consulted.   She does have some anemia of chronic disease with current hemoglobin of 7.5.  She did have imaging of her hip done which showed no evidence of fractures in the emergency department.  She denies any recent trauma and lives next to her daughter Sanjuana who also  confirms that she has not had any recent trauma and noted.  Her daughter lives next door to her and is with her 90% of the time.  Her past medical history is significant for hypothyroidism, COPD on 3 L nasal cannula at baseline, heart failure with preserved ejection fraction and history of lung cancer status post right upper lobectomy and chemotherapy.  She denies any recent chest pain tightness or palpitations.  She denies any shortness of breath, cough or sputum production.  Denies any fevers chills or night sweats.  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    Review of Systems    As per history of present illness otherwise 10 point review of systems is negative.       Personal History     Past Medical History:   Diagnosis Date    Allergic rhinitis     Anaphylactic shock, unspecified, initial encounter 12/05/2023    Anemia     Arthritis     Asthma     USE INHALERS AND NEBULIZERS    Back pain     Bladder disorder     Cancer     LEFT LUNG CANCER 2005 SURGERY AND CHEMO DONE  AND CURRENTLY 4/ 14/22  RIGHT LUNG CANCER HAS ONLY RECEIVED RADIATION THUS FAR    Chronic kidney disease     stage 3    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 DENIES CP BUT DOES HAVE SOA CHRONIC ISSUE COPD/LUNG CANCER    COPD (chronic obstructive pulmonary disease)     FOLLOWED BY DR MARIAJOSE BAILEY    Coronary artery disease     DENIES CP BUT DOES GET SOA MOST OF THE TIME WITH EXERTION BUT OCC AT REST CHRONIC ISSUE COPD/LUNG CANCER    Deep vein thrombosis     Diabetes mellitus     DOES NOT CHECK BS DAILY    Disease of thyroid gland     HYPOTHYROIDISM    Essential hypertension     Gastric ulcer     GERD (gastroesophageal reflux disease)     Heart murmur     History of  transfusion     NO ISSUES POST TRANSFUSION WAS MANY YEARS AGO    Hyperlipemia     Leukocytopenia     FOLLOWED BY DR MIR BAILEY    Limb swelling     Lumbago     Lumbar spinal stenosis     Lung cancer     Migraine headache     Multiple joint pain     On home oxygen therapy     4L/NC PRN    Osteopenia     PNA (pneumonia) 05/04/2024    Pseudomonas pneumonia 04/29/2024    Reflux esophagitis     Shortness of breath     Thyroid nodule     HAS ULTRASOUND YEARLY BEING MONITORED    Vascular disease     Vitamin D deficiency        Past Surgical History:   Procedure Laterality Date    ABDOMINAL SURGERY      ANGIOPLASTY RENAL ARTERY Left 06/14/2024    stent placement    APPENDECTOMY      ARTERIOGRAM N/A 6/14/2024    Procedure: Arteriogram, right radial access, aortogram and renal arteriogram with possible intervention.;  Surgeon: Dio Miguel MD;  Location: [facility] CATH INVASIVE LOCATION;  Service: Peripheral Vascular;  Laterality: N/A;    ARTERIOVENOUS FISTULA/SHUNT SURGERY Left 05/10/2022    Procedure: Left basilic vein transposition;  Surgeon: Wan Barksdale MD;  Location: [facility] MAIN OR;  Service: Vascular;  Laterality: Left;    ARTERIOVENOUS FISTULA/SHUNT SURGERY Left 03/23/2023    Procedure: Ligation of left arm arteriovenous fistula;  Surgeon: Wan Barksdale MD;  Location: [facility] MAIN OR;  Service: Vascular;  Laterality: Left;    ARTERIOVENOUS FISTULA/SHUNT SURGERY Left 11/30/2023    Procedure: Creation of left arm arteriovenous graft;  Surgeon: Wan Barksdale MD;  Location: [facility] MAIN OR;  Service: Vascular;  Laterality: Left;    BRONCHOSCOPY N/A 04/24/2024    Procedure: BRONCHOSCOPY WITH BAL AND WASHINGS;  Surgeon: Khalif Yanez MD;  Location: [facility] ENDOSCOPY;  Service: Pulmonary;  Laterality: N/A;  MUCUS PLUGGING    CARDIAC CATHETERIZATION  1996    CARDIAC SURGERY      CARDIAC SURGERY      fluid drained from heart    CATARACT EXTRACTION, BILATERAL  2003    COLONOSCOPY  2014    ENDOSCOPY  2016    2019    FEMORAL  ARTERY STENT Bilateral     HYSTERECTOMY      LUNG BIOPSY Left 2005    lobectomy upper lung caner    LUNG VOLUME REDUCTION      OTHER SURGICAL HISTORY      artifical joints/limbs    REPLACEMENT TOTAL KNEE Left 2016    SHUNT O GRAM N/A 1/13/2025    Procedure: dialysis shuntogram;  Surgeon: Court Valente MD;  Location: [facility] INVASIVE LOCATION;  Service: Peripheral Vascular;  Laterality: N/A;    UPPER GASTROINTESTINAL ENDOSCOPY         Family History: family history includes Arthritis in her father and mother; Cancer in her brother, brother, brother, and brother; Diabetes in her brother; Other in her brother; Prostate cancer in her brother and brother. Otherwise pertinent FHx was reviewed and not pertinent to current issue.    Social History:  reports that she quit smoking about 20 years ago. Her smoking use included cigarettes. She started smoking about 73 years ago. She has a 52.5 pack-year smoking history. She has been exposed to tobacco smoke. She has never used smokeless tobacco. She reports that she does not currently use alcohol. She reports that she does not use drugs.    Home Medications:  Psyllium, acetaminophen, albuterol sulfate HFA, amLODIPine, aspirin, budesonide, carvedilol, clopidogrel, dexlansoprazole, ferrous sulfate, fluticasone, furosemide, hydrALAZINE, ipratropium-albuterol, isosorbide mononitrate, levocetirizine, levothyroxine, lidocaine-prilocaine, losartan, rosuvastatin, sevelamer, tobramycin PF, and vitamin D      Allergies:  No Known Allergies    Objective   Objective     Vitals:   Temp:  [97.3 °F (36.3 °C)-97.7 °F (36.5 °C)] 97.5 °F (36.4 °C)  Heart Rate:  [68-86] 80  Resp:  [18-20] 18  BP: (145-175)/(47-69) 149/56  Flow (L/min) (Oxygen Therapy):  [4] 4    Physical Exam    General: Awake, alert, minimal distress   Eyes:  SANTIAGO, EOMI, Sclera non-icteric   Neck: Supple, no LAD or carotid bruits present   Lungs: Clear to auscultation B, diminished in bases   Heart: RRR   Abdomen: Soft,  nontender, nondistended with positive bowel sounds   Ext: No clubbing cyanosis or edema.  B radial and femoral pulses palp.  Left lower extremity is sensorimotor intact with no evidence of acute limb ischemia present and easily dopplerable bilateral pedal signals.   Neuro: CN's II-XII grossly intact.  No focal or lateralizing deficits present currently.    Pertinent Lab Data:    CBC:      Lab 01/26/25  2204   WBC 3.90   HEMOGLOBIN 7.5*   HEMATOCRIT 26.5*   PLATELETS 116*   NEUTROS ABS 2.35   IMMATURE GRANS (ABS) 0.01   LYMPHS ABS 0.65*   MONOS ABS 0.72   EOS ABS 0.15   .5*        CMP:        Lab 01/26/25  2204   SODIUM 145   POTASSIUM 5.2   CHLORIDE 105   CO2 28.8   ANION GAP 11.2   BUN 34*   CREATININE 4.70*   EGFR 8.5*   GLUCOSE 188*   CALCIUM 7.8*   TOTAL PROTEIN 5.8*   ALBUMIN 3.3*   GLOBULIN 2.5   ALT (SGPT) 14   AST (SGOT) 25   BILIRUBIN 0.2   ALK PHOS 292*        CT Angio Abdominal Aorta Bilateral Iliofem Runoff    Result Date: 1/27/2025  Impression: 1. Chronic bilateral SFA occlusions stable since 2020. Bilateral distal at the level of the popliteal arteries through chronic developed collaterals, with three-vessel runoff to the level of the ankle bilaterally. 2. Severe intra-abdominal atherosclerotic disease including high-grade stenosis of proximal celiac artery, proximal SMA and proximal right renal artery. Mild ectatic dilation of the infrarenal abdominal aorta. All vascular stents in the abdomen are patent. No evidence of solid organ or hollow viscus ischemia. 3. Stable left common femoral artery pseudoaneurysm since 5/31/2024. 4. Masslike right lower lobe consolidation which could reflect a focal pneumonia versus potential neoplasm in patient with prior history of lung malignancy. Consider pulmonary consultation for further management. 5. Findings suggesting volume overload/third spacing including small right pleural effusion, interstitial pulmonary edema and right lower lobe groundglass  suspicious for early alveolar edema versus nonspecific infectious/inflammatory process. Body wall and bilateral lower extremity edema, left greater than right. 6. Cholelithiasis. 7. Numerous additional chronic/ancillary findings as above. Electronically Signed: Ildefonso Purcell MD  1/27/2025 1:11 PM EST  Workstation ID: [id number]    XR Hip With or Without Pelvis 2 - 3 View Left    Result Date: 1/27/2025  No acute fracture or acute malalignment is identified.   Portions of this note were completed with a voice recognition program.   Electronically Signed By-Chicho Jones MD On:1/27/2025 5:15 AM         Assessment & Plan   Assessment / Plan     Active Hospital Problems:  Active Hospital Problems    Diagnosis     **Weakness     Peripheral vascular disease of lower extremity        Assessment/plan:   Patient is an 89-year-old  female with end-stage renal disease on hemodialysis currently via right IJ tunneled dialysis catheter who also has a history of peripheral vascular disease with known bilateral common iliac artery stenting and arranged bifurcation as well as renal artery angioplasty and stenting who presented to the hospital with new onset left lower extremity weakness and thigh pain but more recently concern for significant swelling.  2.  Patient underwent a left lower extremity venous duplex study which showed no evidence of DVT but a question of a possible superficial femoral artery occlusion.  She subsequent underwent a CT angiogram of the aorta with runoff which showed chronic occlusion of her bilateral superficial femoral arteries with reconstitution at the level of the popliteal arteries via collaterals and three-vessel runoff to the ankles bilaterally.  This was noted to be stable comparatively to a CT angiogram performed in 2020.  3.  Patient also has asymptomatic stenoses of the proximal celiac and SMA as well as proximal right renal artery with no evidence of ischemia or changes.  4.  She has a  stable left common femoral artery pseudoaneurysm which has been present since May 2024 and managed conservatively.  5.  Given her pain we will obtain bilateral lower extremity ABIs with toe pressures to compare to previous studies.  Her most recent studies are back on 6/27/2022 and showed a right GILLIAN of 0.65 and left GILLIAN of 0.84 with right great toe pressure of 70 and left great toe pressure 102.  If these are stable then the patient will likely be managed conservatively without any attempted intervention as she is quite frail with multiple comorbidities which would put her at high risk of surgical complication.  6.  Most recent echo was performed on 8/24/2024 showing some left ventricular systolic function which is low normal with an EF of 51 to 55% moderately calcified aortic valve and a possible mitral valve stenosis which was unable to be completely assessed.  Currently she does not have any signs of acute limb ischemia which is absent would be expected with only chronic peripheral arterial disease reconstituted by longstanding collaterals.  7.  If there is any significant change to her ABIs and clinically she does not improve then we may consider an antegrade left lower extremity angiogram with possible intervention.  However I do not think that she would be an appropriate candidate for an open operation unless this was for limb salvage or significant unrelenting rest pain which she does not currently have and would likely require cardiac clearance for this prior.  Of note her symptoms are not classic for arterial disease and her main current issue is that she was unable to bear weight on the left lower extremity.  8.  Past medical history significant for end-stage renal disease once again on hemodialysis Monday Wednesday Friday via tunneled dialysis catheter, peripheral arterial disease with previous stenting as above, hypothyroidism, COPD on 3 L nasal cannula at baseline, history of CHF, previous lung cancer  status post right upper lobectomy and chemotherapy.        Electronically signed by Jaden Castañeda MD, 01/27/25, 2:32 PM EST.

## 2025-01-27 NOTE — NURSING NOTE
Processed 84L  Removed 2 L  Duration 3.5 hours  Tolerated treatment well. Used TDC and it functioned well. BP lowered at the end of treatment due to getting Dilaudid for pain in her lower legs.Order Questions    Question Answer   Duration of Treatment 3.5 Hours   Access Site AVF   Dialyzer Revaclear    mL/min   Dialysate Temperature (C) 36   BFR-As tolerated to a maximum of: 400 mL/min   Dialysate Solution Bath: K+ = 2 mEq, Ca = 3mEq   Bicarb 30 mEq   Na+ 140 mEq   Fluid Removal: 2

## 2025-01-27 NOTE — CONSULTS
Discharge Planning Assessment  [facility]     Patient Name: Charlette Rai  MRN: [id number]  Today's Date: 1/27/2025    Admit Date: 1/26/2025    Plan: Pt admitted due to weakness. MINI spoke with pt to assess needs. Pt lives at home with her  and reports fair independence in ADLs. Pt has a supportive family who is able to assist as needed. Pt wears home 02 through [employer]. Discussed potential needs for rehab or HHC but pt is unsure at this time. PTOT pending. Will follow for recommendations. Pharmacy/PCP confirmed.   Discharge Needs Assessment       Row Name 01/27/25 1228       Living Environment    People in Home spouse    Name(s) of People in Home Pt lives in [place] with her     Current Living Arrangements home    Potentially Unsafe Housing Conditions none    In the past 12 months has the electric, gas, oil, or water company threatened to shut off services in your home? No    Primary Care Provided by self    Provides Primary Care For no one    Family Caregiver if Needed spouse;child(maryam), adult    Quality of Family Relationships supportive;involved;helpful    Able to Return to Prior Arrangements yes       Resource/Environmental Concerns    Resource/Environmental Concerns none       Transportation Needs    In the past 12 months, has lack of transportation kept you from medical appointments or from getting medications? no    In the past 12 months, has lack of transportation kept you from meetings, work, or from getting things needed for daily living? No       Food Insecurity    Within the past 12 months, you worried that your food would run out before you got the money to buy more. Never true    Within the past 12 months, the food you bought just didn't last and you didn't have money to get more. Never true       Transition Planning    Patient/Family Anticipates Transition to home with family;home with help/services;inpatient rehabilitation facility    Patient/Family Anticipated Services at Transition  home health care;skilled nursing;rehabilitation services    Transportation Anticipated family or friend will provide       Discharge Needs Assessment    Readmission Within the Last 30 Days no previous admission in last 30 days    Concerns to be Addressed basic needs;discharge planning    Anticipated Changes Related to Illness none    Equipment Needed After Discharge none                   Discharge Plan       Row Name 01/27/25 1229       Plan    Plan Pt admitted due to weakness. SW spoke with pt to assess needs. Pt lives at home with her  and reports fair independence in ADLs. Pt has a supportive family who is able to assist as needed. Pt wears home 02 through [employer]. Discussed potential needs for rehab or HHC but pt is unsure at this time. PTOT pending. Will follow for recommendations. Pharmacy/PCP confirmed.    Patient/Family in Agreement with Plan yes                  Continued Care and Services - Admitted Since 1/26/2025    No active coordination exists for this encounter.          Demographic Summary       Row Name 01/27/25 1227       General Information    Admission Type observation    Arrived From emergency department    Referral Source admission list    Reason for Consult discharge planning    Preferred Language English       Contact Information    Permission Granted to Share Info With family/designee                   Functional Status       Row Name 01/27/25 1227       Functional Status    Usual Activity Tolerance moderate    Current Activity Tolerance moderate       Functional Status, IADL    Medications independent    Meal Preparation assistive equipment    Housekeeping assistive equipment    Laundry assistive equipment    Shopping assistive equipment       Mental Status Summary    Recent Changes in Mental Status/Cognitive Functioning no changes                   Psychosocial       Row Name 01/27/25 1227       Emotion Mood WDL    Emotion/Mood/Affect WDL WDL       Speech WDL    Speech WDL WDL        Perceptual State WDL    Perceptual State WDL WDL       Thought Process WDL    Thought Process WDL WDL       Intellectual Performance WDL    Intellectual Performance WDL WDL       Coping/Stress    Major Change/Loss/Stressor none    Sources of Support spouse    Techniques to [place] with Loss/Stress/Change not applicable    Reaction to Health Status accepting    Understanding of Condition and Treatment adequate understanding of medical condition       Developmental Stage (Eriksson's Stages of Development)    Developmental Stage Stage 8 (65 years-death/Late Adulthood) Integrity vs. Despair           SHAI Yeboah

## 2025-01-27 NOTE — H&P
[facility]   HOSPITALIST HISTORY AND PHYSICAL  Date: 2025   Patient Name: Charlette Rai  : 1935  MRN: [id number]  Primary Care Physician:  Brennan Mosqueda MD  Date of admission: 2025    Subjective   Subjective     Chief Complaint: Weakness    HPI:    Charlette Rai is a 89 y.o. female past medical history of ESRD on HD , peripheral arterial disease status post lower extremity stenting, renal artery stenosis status post tenting, hypothyroidism, COPD on 3 L nasal cannula, heart failure preserved ejection fraction, prior lung cancer status post chemotherapy and right upper lobectomy who presents to the ER due to new onset left lower extremity swelling with pain and weakness.  Patient states that roughly 4 days ago she started noticed increasing swelling below her left knee and has progressively worsened in the last 3 days to the point that it is up to her hip.  In that time she was also had progressive weakness and inability bear weight on her left lower extremity due to pain that ultimately prompted her family to advise her to come into the ER tonight for evaluation    On arrival patient was hemodynamically stable and on her home level of oxygen.  Lab workup was revealing of a mildly elevated INR of 1.2, chronically elevated creatinine of 4.7 and chronic anemia with hemoglobin of 7.5 as well as platelets of 116.  Hip imaging was done and did not reveal any fractures and a an ultrasound of the left lower extremity is pending at this time.  Attempt was made to ambulate the patient in the ER but this was unsuccessful as she has difficulty bearing weight on this and as a result hospitalist was then contacted for admission for deconditioning and debility related to a possible DVT in the left lower extremity.  Signed my exam patient is resting comfortably and is able to lift her leg in bed but states that she cannot bear any weight on it.  Denies having any trauma  recently.  No recent surgery to the left lower extremity.  Denies any history of DVTs.  She has not missed any dialysis.      Personal History     Past Medical History:  Past Medical History:   Diagnosis Date    Allergic rhinitis     Anaphylactic shock, unspecified, initial encounter 12/05/2023    Anemia     Arthritis     Asthma     USE INHALERS AND NEBULIZERS    Back pain     Bladder disorder     Cancer     LEFT LUNG CANCER 2005 SURGERY AND CHEMO DONE  AND CURRENTLY 4/ 14/22  RIGHT LUNG CANCER HAS ONLY RECEIVED RADIATION THUS FAR    Chronic kidney disease     stage 3    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 DENIES CP BUT DOES HAVE SOA CHRONIC ISSUE COPD/LUNG CANCER    COPD (chronic obstructive pulmonary disease)     FOLLOWED BY DR MARIAJOSE BAILEY    Coronary artery disease     DENIES CP BUT DOES GET SOA MOST OF THE TIME WITH EXERTION BUT OCC AT REST CHRONIC ISSUE COPD/LUNG CANCER    Deep vein thrombosis     Diabetes mellitus     DOES NOT CHECK BS DAILY    Disease of thyroid gland     HYPOTHYROIDISM    Essential hypertension     Gastric ulcer     GERD (gastroesophageal reflux disease)     Heart murmur     History of transfusion     NO ISSUES POST TRANSFUSION WAS MANY YEARS AGO    Hyperlipemia     Leukocytopenia     FOLLOWED BY DR MIR BAILEY    Limb swelling     Lumbago     Lumbar spinal stenosis     Lung cancer     Migraine headache     Multiple joint pain     On home oxygen therapy     4L/NC PRN    Osteopenia     PNA (pneumonia) 05/04/2024    Pseudomonas pneumonia 04/29/2024    Reflux esophagitis     Shortness of breath     Thyroid nodule     HAS ULTRASOUND YEARLY BEING MONITORED    Vascular disease     Vitamin D deficiency          Past Surgical History:  Past Surgical History:   Procedure Laterality Date    ABDOMINAL SURGERY      ANGIOPLASTY RENAL  ARTERY Left 06/14/2024    stent placement    APPENDECTOMY      ARTERIOGRAM N/A 6/14/2024    Procedure: Arteriogram, right radial access, aortogram and renal arteriogram with possible intervention.;  Surgeon: Dio Miguel MD;  Location: [facility] CATH INVASIVE LOCATION;  Service: Peripheral Vascular;  Laterality: N/A;    ARTERIOVENOUS FISTULA/SHUNT SURGERY Left 05/10/2022    Procedure: Left basilic vein transposition;  Surgeon: Wan Barksdale MD;  Location: [facility] MAIN OR;  Service: Vascular;  Laterality: Left;    ARTERIOVENOUS FISTULA/SHUNT SURGERY Left 03/23/2023    Procedure: Ligation of left arm arteriovenous fistula;  Surgeon: Wan Barksdale MD;  Location: [facility] MAIN OR;  Service: Vascular;  Laterality: Left;    ARTERIOVENOUS FISTULA/SHUNT SURGERY Left 11/30/2023    Procedure: Creation of left arm arteriovenous graft;  Surgeon: Wan Barksdale MD;  Location: [facility] MAIN OR;  Service: Vascular;  Laterality: Left;    BRONCHOSCOPY N/A 04/24/2024    Procedure: BRONCHOSCOPY WITH BAL AND WASHINGS;  Surgeon: Khalif Yanez MD;  Location: [facility] ENDOSCOPY;  Service: Pulmonary;  Laterality: N/A;  MUCUS PLUGGING    CARDIAC CATHETERIZATION  1996    CARDIAC SURGERY      CARDIAC SURGERY      fluid drained from heart    CATARACT EXTRACTION, BILATERAL  2003    COLONOSCOPY  2014    ENDOSCOPY  2016    2019    FEMORAL ARTERY STENT Bilateral     HYSTERECTOMY      LUNG BIOPSY Left 2005    lobectomy upper lung caner    LUNG VOLUME REDUCTION      OTHER SURGICAL HISTORY      artifical joints/limbs    REPLACEMENT TOTAL KNEE Left 2016    SHUNT O GRAM N/A 1/13/2025    Procedure: dialysis shuntogram;  Surgeon: Court Valente MD;  Location: [facility] CATH INVASIVE LOCATION;  Service: Peripheral Vascular;  Laterality: N/A;    UPPER GASTROINTESTINAL ENDOSCOPY           Family History:   Reviewed and noncontributory except as mentioned in HPI    Social History:   Social Drivers of Health     Tobacco Use: Medium Risk (1/27/2025)    Patient  History     Smoking Tobacco Use: Former     Smokeless Tobacco Use: Never     Passive Exposure: Past   Alcohol Use: Not At Risk (8/21/2024)    AUDIT-C     Frequency of Alcohol Consumption: Never     Average Number of Drinks: Patient does not drink     Frequency of Binge Drinking: Never   Financial Resource Strain: Not on file   Food Insecurity: No Food Insecurity (8/23/2024)    Hunger Vital Sign     Worried About Running Out of Food in the Last Year: Never true     Ran Out of Food in the Last Year: Never true   Transportation Needs: No Transportation Needs (8/23/2024)    PRAPARE - Transportation     Lack of Transportation (Medical): No     Lack of Transportation (Non-Medical): No   Physical Activity: Inactive (8/23/2024)    Exercise Vital Sign     Days of Exercise per Week: 0 days     Minutes of Exercise per Session: 0 min   Stress: Not on file   Social Connections: Not on file   Interpersonal Safety: Not At Risk (1/27/2025)    Abuse Screen     Unsafe at Home or Work/School: no     Feels Threatened by Someone?: no     Does Anyone Keep You from Contacting Others or Doint Things Outside the Home?: no     Physical Sign of Abuse Present: no   Depression: Not at risk (9/24/2024)    PHQ-2     PHQ-2 Score: 0   Housing Stability: Not At Risk (1/13/2025)    Housing Stability     Current Living Arrangements: home     Potentially Unsafe Housing Conditions: none   Utilities: Not At Risk (8/23/2024)    [facility] Utilities     Threatened with loss of utilities: No   Health Literacy: Unknown (8/23/2024)    Education     Help with school or training?: Not on file     Preferred Language: English   Employment: Not on file   Disabilities: At Risk (1/13/2025)    Disabilities     Concentrating, Remembering, or Making Decisions Difficulty: no     Doing Errands Independently Difficulty: yes         Home Medications:  acetaminophen, albuterol sulfate HFA, amLODIPine, aspirin, budesonide, carvedilol, clopidogrel, dexlansoprazole, fluticasone,  furosemide, hydrALAZINE, ipratropium-albuterol, isosorbide mononitrate, levocetirizine, levothyroxine, lidocaine-prilocaine, losartan, nitroglycerin, ondansetron, rosuvastatin, sevelamer, tobramycin PF, and vitamin D    Allergies:  No Known Allergies    Review of Systems   All systems were reviewed and negative except for: Left lower extremity edema    Objective   Objective     Vitals:   Temp:  [97.7 °F (36.5 °C)] 97.7 °F (36.5 °C)  Heart Rate:  [68-78] 77  Resp:  [20] 20  BP: (145-175)/(50-60) 167/51  Flow (L/min) (Oxygen Therapy):  [4] 4    Physical Exam    Constitutional: Awake, alert, no acute distress   Eyes: Pupils equal, sclerae anicteric, no conjunctival injection   HENT: NCAT, mucous membranes moist   Neck: Supple, no thyromegaly, no lymphadenopathy, trachea midline   Respiratory: Clear to auscultation bilaterally, nonlabored respirations    Cardiovascular: RRR, no murmurs, rubs, or gallops, palpable pedal pulses bilaterally   Gastrointestinal: Positive bowel sounds, soft, nontender, nondistended   Musculoskeletal: 2+ pitting edema to the left lower extremity extending up to the left hip, no clubbing or cyanosis to extremities   Psychiatric: Appropriate affect, cooperative   Neurologic: Oriented x 3, pain limited weakness in the left lower extremity, Cranial Nerves grossly intact to confrontation, speech clear   Skin: No rashes     Result Review    Result Review:  I have personally reviewed the results from the time of this admission to 1/27/2025 06:22 EST and agree with these findings:  [x]  Laboratory  [x]  Microbiology  [x]  Radiology  [x]  EKG/Telemetry   [x]  Cardiology/Vascular   []  Pathology  [x]  Old records  []  Other:      Assessment & Plan   Assessment / Plan     Assessment/Plan:   Left lower extremity edema, rule out DVT  Weakness, likely pain related due to above  Chronic hypoxic respiratory failure secondary to CHF and COPD  Chronic macrocytic anemia likely of renal insufficiency  ESRD on HD  Monday Wednesday Friday  Hypertension  CHF preserved ejection fraction  History of renal artery stenosis status post stenting  PAD status post lower extremity stents  Hypothyroidism  COPD not in acute exacerbation  Recurrent Pseudomonas pneumonia on tobramycin nebulizers  GERD  History of lung cancer      Plan  - Admit to hospital service for observation  - Left lower extremity ultrasound is pending at this time, follow-up to rule out an acute DVT.  Not on anticoagulation as outpatient but is on dual antiplatelet therapy due to history of PAD  - Weakness seems entirely pain limited, sensation intact on lower extremity exam, no concern for CVA at this time.  PT/OT eval, hip x-ray imaging was negative for any fractures and patient denied any trauma  - Continue oxygen support for chronic hypoxic respiratory failure, no sign of CHF or COPD exacerbation at this time  - Continue to trend hemoglobin and transfuse as needed  - Nephrology consult for HD orders  - Clarify home BP medications and restart  - Continue home Synthroid once we confirm dosing  - Unclear if patient is still on tobramycin nebs we will continue if confirmed with pharmacy      Discussed with ER Physician and Nurse    All labs/imaging studies were personally reviewed and findings are as noted above      DVT Prophylaxis: Heparin    CODE STATUS:    Code Status (Patient has no pulse and is not breathing): CPR (Attempt to Resuscitate)  Medical Interventions (Patient has pulse or is breathing): Full Support      Admission Status:  I believe this patient meets observation status.    Electronically signed by Carlton Barber MD, 01/27/25, 5:40 AM EST.

## 2025-01-27 NOTE — ED TRIAGE NOTES
PT arrives to ED via EMS from home with complaint of LLE swelling and pain that began today, but too painful to walk now. The pain is located on the entire thigh front and back. No area of warmness or redness noted. Pt pedal pulses palpable. Pt denies injury. Pt is a MWF dialysis pt and reports plavix and aspirin use, with history of blood clot in the left arm graft. Pt VSS, NAD, pt wearing 4L NC baseline from home with no reported worsening shortness of breath or chest pain. Pt left leg appears minimally swollen throughout in comparrison to the right. EMS reports pt was ambulatory to stretcher; however with total assistane.

## 2025-01-27 NOTE — CONSULTS
[facility]   Nephrology Consult Note      Patient Name: Charlette Rai  : 1935  MRN: [id number]  Primary Care Physician:  Brennan Mosqueda MD  Referring Physician: No Known Provider  Date of admission: 2025    Subjective   Subjective     Reason for Consult/ Chief Complaint: End-stage renal disease    HPI:  Charlette Rai is a 89 y.o. female with history of end-stage renal disease and on dialysis 3 times a week at [facility] who presented to the ER with generalized weakness.  She has been also complaining of left lower extremity pain and swelling.  She stated that this has been going on for a while but it is worse now.  CT abdomen and pelvis with contrast showed extensive arterial disease/vascular disease involving the abdomen and lower extremities.  Possible right lower lobe consolidation/mass noted.  When I saw her today she was receiving dialysis.  Blood pressure was on the higher side but better by time I saw her.  Her dialysis on Friday was uneventful.  Labs reviewed.    Review of Systems   All systems were reviewed and negative except for: What is mentioned above.    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 DENIES CP BUT DOES HAVE SOA CHRONIC ISSUE COPD/LUNG CANCER    COPD (chronic obstructive pulmonary disease)     FOLLOWED BY DR MARIAJOSE BAILEY    Coronary artery disease     DENIES CP BUT  DOES GET SOA MOST OF THE TIME WITH EXERTION BUT OCC AT REST CHRONIC ISSUE COPD/LUNG CANCER    Deep vein thrombosis     Diabetes mellitus     DOES NOT CHECK BS DAILY    Disease of thyroid gland     HYPOTHYROIDISM    Essential hypertension     Gastric ulcer     GERD (gastroesophageal reflux disease)     Heart murmur     History of transfusion     NO ISSUES POST TRANSFUSION WAS MANY YEARS AGO    Hyperlipemia     Leukocytopenia     FOLLOWED BY DR MIR BAILEY    Limb swelling     Lumbago     Lumbar spinal stenosis     Lung cancer     Migraine headache     Multiple joint pain     On home oxygen therapy     4L/NC PRN    Osteopenia     PNA (pneumonia) 05/04/2024    Pseudomonas pneumonia 04/29/2024    Reflux esophagitis     Shortness of breath     Thyroid nodule     HAS ULTRASOUND YEARLY BEING MONITORED    Vascular disease     Vitamin D deficiency        Past Surgical History:   Procedure Laterality Date    ABDOMINAL SURGERY      ANGIOPLASTY RENAL ARTERY Left 06/14/2024    stent placement    APPENDECTOMY      ARTERIOGRAM N/A 6/14/2024    Procedure: Arteriogram, right radial access, aortogram and renal arteriogram with possible intervention.;  Surgeon: Dio Miguel MD;  Location: [facility] CATH INVASIVE LOCATION;  Service: Peripheral Vascular;  Laterality: N/A;    ARTERIOVENOUS FISTULA/SHUNT SURGERY Left 05/10/2022    Procedure: Left basilic vein transposition;  Surgeon: Wan Barksdale MD;  Location: [facility] MAIN OR;  Service: Vascular;  Laterality: Left;    ARTERIOVENOUS FISTULA/SHUNT SURGERY Left 03/23/2023    Procedure: Ligation of left arm arteriovenous fistula;  Surgeon: Wan Barksdale MD;  Location: [facility] MAIN OR;  Service: Vascular;  Laterality: Left;    ARTERIOVENOUS FISTULA/SHUNT SURGERY Left 11/30/2023    Procedure: Creation of left arm arteriovenous graft;  Surgeon: Wan Barksdale MD;  Location: [facility] MAIN OR;  Service: Vascular;  Laterality: Left;    BRONCHOSCOPY N/A 04/24/2024    Procedure: BRONCHOSCOPY WITH BAL  AND WASHINGS;  Surgeon: Khalif Yanez MD;  Location: [facility] ENDOSCOPY;  Service: Pulmonary;  Laterality: N/A;  MUCUS PLUGGING    CARDIAC CATHETERIZATION  1996    CARDIAC SURGERY      CARDIAC SURGERY      fluid drained from heart    CATARACT EXTRACTION, BILATERAL  2003    COLONOSCOPY  2014    ENDOSCOPY  2016 2019    FEMORAL ARTERY STENT Bilateral     HYSTERECTOMY      LUNG BIOPSY Left 2005    lobectomy upper lung caner    LUNG VOLUME REDUCTION      OTHER SURGICAL HISTORY      artifical joints/limbs    REPLACEMENT TOTAL KNEE Left 2016    SHUNT O GRAM N/A 1/13/2025    Procedure: dialysis shuntogram;  Surgeon: Court Valente MD;  Location: [facility] CATH INVASIVE LOCATION;  Service: Peripheral Vascular;  Laterality: N/A;    UPPER GASTROINTESTINAL ENDOSCOPY         Family History: family history includes Arthritis in her father and mother; Cancer in her brother, brother, brother, and brother; Diabetes in her brother; Other in her brother; Prostate cancer in her brother and brother. Otherwise pertinent FHx was reviewed and not pertinent to current issue.    Social History:  reports that she quit smoking about 20 years ago. Her smoking use included cigarettes. She started smoking about 73 years ago. She has a 52.5 pack-year smoking history. She has been exposed to tobacco smoke. She has never used smokeless tobacco. She reports that she does not currently use alcohol. She reports that she does not use drugs.    Home Medications:  Psyllium, acetaminophen, albuterol sulfate HFA, amLODIPine, aspirin, budesonide, carvedilol, clopidogrel, dexlansoprazole, ferrous sulfate, fluticasone, furosemide, hydrALAZINE, ipratropium-albuterol, isosorbide mononitrate, levocetirizine, levothyroxine, lidocaine-prilocaine, losartan, rosuvastatin, sevelamer, tobramycin PF, and vitamin D    Allergies:  No Known Allergies    Objective    Objective     Vitals:   Temp:  [97.3 °F (36.3 °C)-97.7 °F (36.5 °C)] 97.5 °F (36.4 °C)  Heart Rate:   [68-86] 80  Resp:  [18-20] 18  BP: ()/(47-69) 91/61  Flow (L/min) (Oxygen Therapy):  [4] 4     Physical Exam    Constitutional: Awake, alert, no distress, seen on dialysis.  Not feeling well   Eyes: sclerae anicteric, no conjunctival injection   HENT: mucous membranes moist   Neck: Supple, no thyromegaly, no lymphadenopathy, trachea midline, mild JVD   Respiratory: Decreased to auscultation bilaterally, nonlabored respirations    Cardiovascular: RRR, no murmurs, rubs, or gallops.   Gastrointestinal: Positive bowel sounds, soft, nontender, nondistended   Musculoskeletal:  trace edema left lower extremity, no edema on the right side, no clubbing or cyanosis, left lower extremity tender but inconsistently all the way from ankle to the hip.   Psychiatric: Appropriate affect, cooperative   Neurologic: Oriented x 3, moving all extremities, Cranial Nerves grossly intact, speech clear   Skin: warm and dry, no rashes    Right IJ TDC in place, site is clean and covered.    Result Review    Result Reviewed:  I have personally reviewed the results from the time of this admission to 1/27/2025 16:22 EST and agree with these findings:  [x]  Laboratory  []  Microbiology  [x]  Radiology  []  EKG/Telemetry   []  Cardiology/Vascular   []  Pathology  [x]  Old records  []  Other:  LAB RESULTS:    LAB RESULTS:        Lab 01/26/25  2204   SODIUM 145   POTASSIUM 5.2   CHLORIDE 105   CO2 28.8   BUN 34*   CREATININE 4.70*   GLUCOSE 188*   EGFR 8.5*   ANION GAP 11.2           Most notable findings include: As above.  CT angiogram noted.  Previous imaging reviewed.  Hemoglobin 7.5, iron studies showed iron saturation 17%, ferritin 1000.    Assessment & Plan   Assessment / Plan     Brief Patient Summary:  Charlette Rai is a 89 y.o. female who has end-stage renal disease admitted with left lower extremity swelling and pain and shortness of breath.    Active Hospital Problems:  Active Hospital Problems    Diagnosis     **Weakness      Peripheral vascular disease of lower extremity    ESRD  Anemia of chronic kidney disease  History of lung cancer 2005    Assessment and Plan:   - ESRD, dialysis 3 times a week at [facility] through right IJ TDC.  Dialysis today, UF 2 kg as tolerated.  May need additional treatment tomorrow.  - Hypertension, blood pressure should improve after dialysis, resume blood pressure medications per home dosing and monitor  - Anemia, severe, iron studies were equivocal low TSAT but high ferritin possibly inflammatory state.  Transfuse if needed, on long-acting CASSANDRA as outpatient.  - Severe peripheral arterial disease as noted on CT angiogram earlier today.  Per primary.  - Possible lung scarring versus consolidation/mass, pulmonary consulted.  - Hypothyroidism, per primary.  Type 2 diabetes with complications, per primary.  - Diastolic dysfunction/LVH and aortic stenosis.  - Renal mass, followed by urology as outpatient.  Discussed with patient and with dialysis staff.  Will follow,    Thank you very much for this consult!    Electronically signed by Edi García MD, 1/27/2025, 16:22 EST.

## 2025-01-27 NOTE — PLAN OF CARE
Goal Outcome Evaluation:  Plan of Care Reviewed With: patient, spouse        Progress: no change  Outcome Evaluation: patient rested in bed throughout the shift. Intermittent complaints of pain or discomfort. Reached out to provider for medications and given per MAR. Patient had dialysis at bedside today. Seen by pulmonology for lung mass. started on heparin drip due to femoral artery occlusion; seen by Dr. Castañeda and plans for possible angioplasty on Wednesday. Patient to be NPO after midnight 0000 12/29. BP 74/47 after dialysis so patient's dilaudid discontinued per provider order. Remains on 4 liters nasal cannula which is patient's baseline at home. Continue plan of care.

## 2025-01-28 ENCOUNTER — ANESTHESIA EVENT (OUTPATIENT)
Dept: PERIOP | Facility: HOSPITAL | Age: 89
End: 2025-01-28
Payer: MEDICARE

## 2025-01-28 LAB
ABO GROUP BLD: NORMAL
ABO GROUP BLD: NORMAL
ALBUMIN SERPL-MCNC: 3 G/DL (ref 3.5–5.2)
ANION GAP SERPL CALCULATED.3IONS-SCNC: 8.9 MMOL/L (ref 5–15)
APTT PPP: 151.7 SECONDS (ref 78–95.9)
APTT PPP: 46.9 SECONDS (ref 78–95.9)
BASOPHILS # BLD AUTO: 0.02 10*3/MM3 (ref 0–0.2)
BASOPHILS NFR BLD AUTO: 0.4 % (ref 0–1.5)
BH CV LOWER ARTERIAL LEFT ABI RATIO: 0.62
BH CV LOWER ARTERIAL LEFT DORSALIS PEDIS SYS MAX: 85
BH CV LOWER ARTERIAL LEFT GREAT TOE SYS MAX: 52
BH CV LOWER ARTERIAL LEFT POST TIBIAL SYS MAX: NORMAL
BH CV LOWER ARTERIAL LEFT TBI RATIO: 0.38
BH CV LOWER ARTERIAL RIGHT ABI RATIO: NORMAL
BH CV LOWER ARTERIAL RIGHT DORSALIS PEDIS SYS MAX: NORMAL
BH CV LOWER ARTERIAL RIGHT GREAT TOE SYS MAX: 52
BH CV LOWER ARTERIAL RIGHT POST TIBIAL SYS MAX: NORMAL
BH CV LOWER ARTERIAL RIGHT TBI RATIO: 0.38
BLD GP AB SCN SERPL QL: NEGATIVE
BUN SERPL-MCNC: 18 MG/DL (ref 8–23)
BUN/CREAT SERPL: 5.2 (ref 7–25)
CALCIUM SPEC-SCNC: 8 MG/DL (ref 8.6–10.5)
CHLORIDE SERPL-SCNC: 106 MMOL/L (ref 98–107)
CO2 SERPL-SCNC: 24.1 MMOL/L (ref 22–29)
CREAT SERPL-MCNC: 3.47 MG/DL (ref 0.57–1)
DEPRECATED RDW RBC AUTO: 59.2 FL (ref 37–54)
EGFRCR SERPLBLD CKD-EPI 2021: 12.3 ML/MIN/1.73
EOSINOPHIL # BLD AUTO: 0.14 10*3/MM3 (ref 0–0.4)
EOSINOPHIL NFR BLD AUTO: 3 % (ref 0.3–6.2)
ERYTHROCYTE [DISTWIDTH] IN BLOOD BY AUTOMATED COUNT: 16 % (ref 12.3–15.4)
GLUCOSE SERPL-MCNC: 102 MG/DL (ref 65–99)
HBV CORE AB SERPL QL IA: NEGATIVE
HCT VFR BLD AUTO: 25.9 % (ref 34–46.6)
HGB BLD-MCNC: 8.1 G/DL (ref 12–15.9)
IMM GRANULOCYTES # BLD AUTO: 0.01 10*3/MM3 (ref 0–0.05)
IMM GRANULOCYTES NFR BLD AUTO: 0.2 % (ref 0–0.5)
INR PPP: 1.18 (ref 0.86–1.15)
LYMPHOCYTES # BLD AUTO: 0.75 10*3/MM3 (ref 0.7–3.1)
LYMPHOCYTES NFR BLD AUTO: 16.2 % (ref 19.6–45.3)
MAGNESIUM SERPL-MCNC: 2 MG/DL (ref 1.6–2.4)
MCH RBC QN AUTO: 33.9 PG (ref 26.6–33)
MCHC RBC AUTO-ENTMCNC: 31.3 G/DL (ref 31.5–35.7)
MCV RBC AUTO: 108.4 FL (ref 79–97)
MONOCYTES # BLD AUTO: 0.82 10*3/MM3 (ref 0.1–0.9)
MONOCYTES NFR BLD AUTO: 17.7 % (ref 5–12)
NEUTROPHILS NFR BLD AUTO: 2.9 10*3/MM3 (ref 1.7–7)
NEUTROPHILS NFR BLD AUTO: 62.5 % (ref 42.7–76)
NRBC BLD AUTO-RTO: 0 /100 WBC (ref 0–0.2)
PHOSPHATE SERPL-MCNC: 4.8 MG/DL (ref 2.5–4.5)
PLATELET # BLD AUTO: 138 10*3/MM3 (ref 140–450)
PMV BLD AUTO: 11.6 FL (ref 6–12)
POTASSIUM SERPL-SCNC: 4.2 MMOL/L (ref 3.5–5.2)
PROTHROMBIN TIME: 15.3 SECONDS (ref 11.8–14.9)
RBC # BLD AUTO: 2.39 10*6/MM3 (ref 3.77–5.28)
RH BLD: POSITIVE
RH BLD: POSITIVE
SODIUM SERPL-SCNC: 139 MMOL/L (ref 136–145)
T&S EXPIRATION DATE: NORMAL
UPPER ARTERIAL RIGHT ARM BRACHIAL SYS MAX: 138
WBC NRBC COR # BLD AUTO: 4.64 10*3/MM3 (ref 3.4–10.8)

## 2025-01-28 PROCEDURE — 83735 ASSAY OF MAGNESIUM: CPT | Performed by: STUDENT IN AN ORGANIZED HEALTH CARE EDUCATION/TRAINING PROGRAM

## 2025-01-28 PROCEDURE — 85730 THROMBOPLASTIN TIME PARTIAL: CPT | Performed by: SURGERY

## 2025-01-28 PROCEDURE — 80069 RENAL FUNCTION PANEL: CPT | Performed by: STUDENT IN AN ORGANIZED HEALTH CARE EDUCATION/TRAINING PROGRAM

## 2025-01-28 PROCEDURE — 94799 UNLISTED PULMONARY SVC/PX: CPT

## 2025-01-28 PROCEDURE — 85610 PROTHROMBIN TIME: CPT | Performed by: INTERNAL MEDICINE

## 2025-01-28 PROCEDURE — 85730 THROMBOPLASTIN TIME PARTIAL: CPT | Performed by: STUDENT IN AN ORGANIZED HEALTH CARE EDUCATION/TRAINING PROGRAM

## 2025-01-28 PROCEDURE — G0378 HOSPITAL OBSERVATION PER HR: HCPCS

## 2025-01-28 PROCEDURE — 99232 SBSQ HOSP IP/OBS MODERATE 35: CPT | Performed by: INTERNAL MEDICINE

## 2025-01-28 PROCEDURE — 97165 OT EVAL LOW COMPLEX 30 MIN: CPT

## 2025-01-28 PROCEDURE — 86901 BLOOD TYPING SEROLOGIC RH(D): CPT | Performed by: SURGERY

## 2025-01-28 PROCEDURE — 94664 DEMO&/EVAL PT USE INHALER: CPT

## 2025-01-28 PROCEDURE — 99232 SBSQ HOSP IP/OBS MODERATE 35: CPT | Performed by: STUDENT IN AN ORGANIZED HEALTH CARE EDUCATION/TRAINING PROGRAM

## 2025-01-28 PROCEDURE — 86850 RBC ANTIBODY SCREEN: CPT | Performed by: SURGERY

## 2025-01-28 PROCEDURE — 86900 BLOOD TYPING SEROLOGIC ABO: CPT | Performed by: SURGERY

## 2025-01-28 PROCEDURE — 85025 COMPLETE CBC W/AUTO DIFF WBC: CPT | Performed by: STUDENT IN AN ORGANIZED HEALTH CARE EDUCATION/TRAINING PROGRAM

## 2025-01-28 RX ORDER — TOBRAMYCIN INHALATION SOLUTION 300 MG/5ML
300 INHALANT RESPIRATORY (INHALATION)
Status: DISCONTINUED | OUTPATIENT
Start: 2025-01-28 | End: 2025-02-01 | Stop reason: HOSPADM

## 2025-01-28 RX ORDER — ISOSORBIDE MONONITRATE 30 MG/1
30 TABLET, EXTENDED RELEASE ORAL NIGHTLY
Status: DISCONTINUED | OUTPATIENT
Start: 2025-01-28 | End: 2025-02-01 | Stop reason: HOSPADM

## 2025-01-28 RX ORDER — SEVELAMER CARBONATE 800 MG/1
800 TABLET, FILM COATED ORAL
Status: DISCONTINUED | OUTPATIENT
Start: 2025-01-28 | End: 2025-02-01 | Stop reason: HOSPADM

## 2025-01-28 RX ORDER — CARVEDILOL 25 MG/1
25 TABLET ORAL 2 TIMES DAILY WITH MEALS
Status: DISCONTINUED | OUTPATIENT
Start: 2025-01-28 | End: 2025-02-01 | Stop reason: HOSPADM

## 2025-01-28 RX ORDER — ARFORMOTEROL TARTRATE 15 UG/2ML
15 SOLUTION RESPIRATORY (INHALATION)
Status: DISCONTINUED | OUTPATIENT
Start: 2025-01-28 | End: 2025-02-01 | Stop reason: HOSPADM

## 2025-01-28 RX ORDER — ASPIRIN 81 MG/1
81 TABLET, CHEWABLE ORAL DAILY
Status: DISCONTINUED | OUTPATIENT
Start: 2025-01-28 | End: 2025-01-29

## 2025-01-28 RX ORDER — BUDESONIDE 0.5 MG/2ML
0.5 INHALANT ORAL
Status: DISCONTINUED | OUTPATIENT
Start: 2025-01-28 | End: 2025-02-01 | Stop reason: HOSPADM

## 2025-01-28 RX ADMIN — IPRATROPIUM BROMIDE AND ALBUTEROL SULFATE 3 ML: .5; 3 SOLUTION RESPIRATORY (INHALATION) at 07:08

## 2025-01-28 RX ADMIN — ISOSORBIDE MONONITRATE 30 MG: 30 TABLET, EXTENDED RELEASE ORAL at 20:45

## 2025-01-28 RX ADMIN — ROSUVASTATIN 20 MG: 20 TABLET, FILM COATED ORAL at 20:45

## 2025-01-28 RX ADMIN — IPRATROPIUM BROMIDE AND ALBUTEROL SULFATE 3 ML: .5; 3 SOLUTION RESPIRATORY (INHALATION) at 18:53

## 2025-01-28 RX ADMIN — SEVELAMER CARBONATE 800 MG: 800 TABLET, FILM COATED ORAL at 18:06

## 2025-01-28 RX ADMIN — IPRATROPIUM BROMIDE AND ALBUTEROL SULFATE 3 ML: .5; 3 SOLUTION RESPIRATORY (INHALATION) at 02:41

## 2025-01-28 RX ADMIN — IPRATROPIUM BROMIDE AND ALBUTEROL SULFATE 3 ML: .5; 3 SOLUTION RESPIRATORY (INHALATION) at 15:13

## 2025-01-28 RX ADMIN — FLUTICASONE PROPIONATE 1 SPRAY: 50 SPRAY, METERED NASAL at 10:07

## 2025-01-28 RX ADMIN — LEVOTHYROXINE SODIUM 50 MCG: 50 TABLET ORAL at 06:00

## 2025-01-28 RX ADMIN — TOBRAMYCIN 300 MG: 300 SOLUTION RESPIRATORY (INHALATION) at 19:01

## 2025-01-28 RX ADMIN — CARVEDILOL 25 MG: 25 TABLET, FILM COATED ORAL at 18:05

## 2025-01-28 RX ADMIN — ASPIRIN 81 MG: 81 TABLET, CHEWABLE ORAL at 18:06

## 2025-01-28 RX ADMIN — Medication 10 ML: at 20:45

## 2025-01-28 RX ADMIN — Medication 10 ML: at 10:07

## 2025-01-28 RX ADMIN — ARFORMOTEROL TARTRATE 15 MCG: 15 SOLUTION RESPIRATORY (INHALATION) at 18:53

## 2025-01-28 RX ADMIN — ACETAMINOPHEN 650 MG: 325 TABLET ORAL at 18:06

## 2025-01-28 RX ADMIN — BUDESONIDE 0.5 MG: 0.5 INHALANT ORAL at 18:53

## 2025-01-28 NOTE — PLAN OF CARE
Goal Outcome Evaluation:  Plan of Care Reviewed With: patient, spouse, child        Progress: improving  Outcome Evaluation: Pleasant patient alert and oriented rested in bed and up in chair throughout the day with family at bedside. Seen by Dr. Castañeda and plans for procedure tomorrow morning at 0715. patient will be NPO after midnight. Consent obtained and in the chart. Seen by pulmonology doctor and plans to do CT needle biopsy of the mass on patient's lung. Hopes to do this biopsy on Friday as patient took her plavix on Sunday and it is supposed to hold 5 days until procedure post taking plavix. Heparin gtt DC'd per Dr. Castañeda. New IV obtained per IV resource nurse. Skin care performed. Patient will have dialysis tomorrow; seen by Dr. Aviles today. Orders entered and dialysis called to confirm orders. Remains on 4 liters nasal cannula which is her baseline. Patient given tylenol for pain, as narcotics dropped her BP significantly yesterday. Continue plan of care, no concerns.

## 2025-01-28 NOTE — THERAPY EVALUATION
Patient Name: Charlette Rai  : 1935    MRN: [id number]                              Today's Date: 2025       Admit Date: 2025    Visit Dx:     ICD-10-CM ICD-9-CM   1. Edema of left lower extremity  R60.0 782.3   2. ESRD (end stage renal disease)  N18.6 585.6   3. Acute leg pain, left  M79.605 729.5   4. Unable to ambulate  R26.2 719.7   5. Peripheral vascular disease of lower extremity  I73.9 443.9   6. Impaired mobility and ADLs  Z74.09 V49.89    Z78.9      Patient Active Problem List   Diagnosis    Malignant neoplasm of upper lobe of right lung    Rheumatoid arthritis    Asthma    Chronic heart failure with preserved ejection fraction    Essential hypertension    GERD (gastroesophageal reflux disease)    Hyperlipidemia LDL goal <70    Lumbar spinal stenosis    Migraine    Monoclonal paraproteinemia    Osteopenia    Oxygen dependent    Peripheral neuropathy    Stage 4 chronic kidney disease    Vitamin D deficiency    Carotid artery stenosis    Chronic right-sided thoracic back pain    Peripheral vascular disease of lower extremity    Ex-smoker    De Quervain's tenosynovitis    Arthritis of carpometacarpal (CMC) joint of right thumb    Steal syndrome of dialysis vascular access    Anemia of chronic renal failure, stage 4 (severe)    Aortic stenosis, moderate    Hematoma    End stage renal disease on dialysis    Coronary artery calcification seen on CT scan    Frailty syndrome in geriatric patient    COPD with lower respiratory infection    Coagulation defect, unspecified    Hyperphosphatemia    Hypothyroidism (acquired)    Idiopathic osteoarthritis    Secondary multiple arthritis    Type 2 diabetes mellitus with diabetic peripheral angiopathy without gangrene    Acute on chronic respiratory failure with hypoxia and hypercapnia    Elevated troponin level not due myocardial infarction    Renal artery stenosis    S/P renal artery angioplasty    Atherosclerosis of renal artery    Abnormal serum  immunoelectrophoresis    ESRD (end stage renal disease) on dialysis    Hypoxia    NSVT (nonsustained ventricular tachycardia)    Hypertensive heart and chronic kidney disease with heart failure and with stage 5 chronic kidney disease, or end stage renal disease    Unspecified protein-calorie malnutrition    IFG (impaired fasting glucose)    Iron deficiency anemia    AV graft malfunction, initial encounter    AV graft malfunction    Weakness     Past Medical History:   Diagnosis Date    Allergic rhinitis     Anaphylactic shock, unspecified, initial encounter 12/05/2023    Anemia     Arthritis     Asthma     USE INHALERS AND NEBULIZERS    Back pain     Bladder disorder     Cancer     LEFT LUNG CANCER 2005 SURGERY AND CHEMO DONE  AND CURRENTLY 4/ 14/22  RIGHT LUNG CANCER HAS ONLY RECEIVED RADIATION THUS FAR    Chronic kidney disease     stage 3    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 DENIES CP BUT DOES HAVE SOA CHRONIC ISSUE COPD/LUNG CANCER    COPD (chronic obstructive pulmonary disease)     FOLLOWED BY DR MARIAJOSE BAILEY    Coronary artery disease     DENIES CP BUT DOES GET SOA MOST OF THE TIME WITH EXERTION BUT OCC AT REST CHRONIC ISSUE COPD/LUNG CANCER    Deep vein thrombosis     Diabetes mellitus     DOES NOT CHECK BS DAILY    Disease of thyroid gland     HYPOTHYROIDISM    Essential hypertension     Gastric ulcer     GERD (gastroesophageal reflux disease)     Heart murmur     History of transfusion     NO ISSUES POST TRANSFUSION WAS MANY YEARS AGO    Hyperlipemia     Leukocytopenia     FOLLOWED BY DR MIR BAILEY    Limb swelling     Lumbago     Lumbar spinal stenosis     Lung cancer     Migraine headache     Multiple joint pain     On home oxygen therapy     4L/NC PRN    Osteopenia     PNA (pneumonia) 05/04/2024    Pseudomonas pneumonia 04/29/2024     Reflux esophagitis     Shortness of breath     Thyroid nodule     HAS ULTRASOUND YEARLY BEING MONITORED    Vascular disease     Vitamin D deficiency      Past Surgical History:   Procedure Laterality Date    ABDOMINAL SURGERY      ANGIOPLASTY RENAL ARTERY Left 06/14/2024    stent placement    APPENDECTOMY      ARTERIOGRAM N/A 6/14/2024    Procedure: Arteriogram, right radial access, aortogram and renal arteriogram with possible intervention.;  Surgeon: Dio Miguel MD;  Location: [facility] CATH INVASIVE LOCATION;  Service: Peripheral Vascular;  Laterality: N/A;    ARTERIOVENOUS FISTULA/SHUNT SURGERY Left 05/10/2022    Procedure: Left basilic vein transposition;  Surgeon: Wan Barksdale MD;  Location: [facility] MAIN OR;  Service: Vascular;  Laterality: Left;    ARTERIOVENOUS FISTULA/SHUNT SURGERY Left 03/23/2023    Procedure: Ligation of left arm arteriovenous fistula;  Surgeon: Wan Barksdale MD;  Location: [facility] MAIN OR;  Service: Vascular;  Laterality: Left;    ARTERIOVENOUS FISTULA/SHUNT SURGERY Left 11/30/2023    Procedure: Creation of left arm arteriovenous graft;  Surgeon: Wan Barksdale MD;  Location: [facility] MAIN OR;  Service: Vascular;  Laterality: Left;    BRONCHOSCOPY N/A 04/24/2024    Procedure: BRONCHOSCOPY WITH BAL AND WASHINGS;  Surgeon: Khailf Yanez MD;  Location: [facility] ENDOSCOPY;  Service: Pulmonary;  Laterality: N/A;  MUCUS PLUGGING    CARDIAC CATHETERIZATION  1996    CARDIAC SURGERY      CARDIAC SURGERY      fluid drained from heart    CATARACT EXTRACTION, BILATERAL  2003    COLONOSCOPY  2014    ENDOSCOPY  2016    2019    FEMORAL ARTERY STENT Bilateral     HYSTERECTOMY      LUNG BIOPSY Left 2005    lobectomy upper lung caner    LUNG VOLUME REDUCTION      OTHER SURGICAL HISTORY      artifical joints/limbs    REPLACEMENT TOTAL KNEE Left 2016    SHUNT O GRAM N/A 1/13/2025    Procedure: dialysis shuntogram;  Surgeon: Court Valente MD;  Location: [facility] CATH INVASIVE LOCATION;  Service: Peripheral  Vascular;  Laterality: N/A;    UPPER GASTROINTESTINAL ENDOSCOPY        General Information       Row Name 01/28/25 1225          OT Time and Intention    Document Type evaluation  -     Mode of Treatment individual therapy;occupational therapy  -     Patient Effort good  -       Row Name 01/28/25 1225          General Information    Patient Profile Reviewed yes  -     Prior Level of Function --  Patient reports independent with ADLs with use of rolling walker.  She states she is on 4 L of supplementary home O2, uses a shower chair and has a raised toilet.  -     Existing Precautions/Restrictions fall  -     Barriers to Rehab none identified  -       Row Name 01/28/25 1225          Occupational Profile    Reason for Services/Referral (Occupational Profile) Pt. is a 89year old female admitted for the above diagnosis. Pt. referred to OT services to assess independence with ADLs and adl transfers/fx'l mobility. No previous OT services for current condition.  -       Row Name 01/28/25 1225          Living Environment    People in Home spouse  -       Row Name 01/28/25 1225          Cognition    Orientation Status (Cognition) oriented x 3  -       Row Name 01/28/25 1225          Safety Issues/Impairments Affecting Functional Mobility    Impairments Affecting Function (Mobility) balance;endurance/activity tolerance;strength;shortness of breath  -               User Key  (r) = Recorded By, (t) = Taken By, (c) = Cosigned By      Initials Name Provider Type     Luh Sears OT Occupational Therapist                     Mobility/ADL's       Row Name 01/28/25 1226          Bed Mobility    Comment, (Bed Mobility) patient seated in recliner upon OT entering the room.  -       Row Name 01/28/25 1226          Transfers    Transfers sit-stand transfer  -       Row Name 01/28/25 1226          Sit-Stand Transfer    Sit-Stand [place] (Transfers) minimum assist (75% patient effort);contact guard  -      Assistive Device (Sit-Stand Transfers) walker, front-wheeled  -       Row Name 01/28/25 1226          Functional Mobility    Functional Mobility- Ind. Level contact guard assist;verbal cues required;1 person  -     Functional Mobility- Device walker, front-wheeled  -AC       Row Name 01/28/25 1226          Activities of Daily Living    BADL Assessment/Intervention --  patient is setup for upper body bathing/dressing, setup for grooming, setup for self-feeding, max A for lower body bathing/dressing, min A for toileting.  -               User Key  (r) = Recorded By, (t) = Taken By, (c) = Cosigned By      Initials Name Provider Type     Luh Sears OT Occupational Therapist                   Obj/Interventions       Row Name 01/28/25 1229          Sensory Assessment (Somatosensory)    Sensory Assessment (Somatosensory) UE sensation intact  -       Row Name 01/28/25 1229          Vision Assessment/Intervention    Visual Impairment/Limitations WFL;corrective lenses full-time  -       Row Name 01/28/25 1229          Range of Motion Comprehensive    General Range of Motion bilateral upper extremity ROM WFL  -       Row Name 01/28/25 1229          Strength Comprehensive (MMT)    Comment, General Manual Muscle Testing (MMT) Assessment BUE 4-/5  -       Row Name 01/28/25 1229          Motor Skills    Motor Skills coordination;functional endurance  -     Coordination WFL  -     Functional Endurance fair minus  -       Row Name 01/28/25 1229          Balance    Balance Assessment standing dynamic balance  -     Dynamic Standing Balance minimal assist;contact guard;1-person assist  -     Position/Device Used, Standing Balance supported;walker, rolling  -AC     Balance Interventions standing;sit to stand;dynamic;minimal challenge;occupation based/functional task;supported  -               User Key  (r) = Recorded By, (t) = Taken By, (c) = Cosigned By      Initials Name Provider Type    JUANJOSE Sears  Luh OT Occupational Therapist                   Goals/Plan       Row Name 01/28/25 1232          Bed Mobility Goal 1 (OT)    Activity/Assistive Device (Bed Mobility Goal 1, OT) bed mobility activities, all  -AC     [place]herty Level/Cues Needed (Bed Mobility Goal 1, OT) modified independence  -AC     Time Frame (Bed Mobility Goal 1, OT) long term goal (LTG);10 days  -AC       Row Name 01/28/25 1232          Transfer Goal 1 (OT)    Activity/Assistive Device (Transfer Goal 1, OT) transfers, all  -AC     [place]herty Level/Cues Needed (Transfer Goal 1, OT) modified independence  -AC     Time Frame (Transfer Goal 1, OT) long term goal (LTG);10 days  -AC       Row Name 01/28/25 1232          Bathing Goal 1 (OT)    Activity/Device (Bathing Goal 1, OT) bathing skills, all  -AC     [place]herty Level/Cues Needed (Bathing Goal 1, OT) modified independence  -AC     Time Frame (Bathing Goal 1, OT) long term goal (LTG);10 days  -AC       Row Name 01/28/25 1232          Dressing Goal 1 (OT)    Activity/Device (Dressing Goal 1, OT) dressing skills, all  -AC     [place]herty/Cues Needed (Dressing Goal 1, OT) modified independence  -AC     Time Frame (Dressing Goal 1, OT) long term goal (LTG);10 days  -AC       Row Name 01/28/25 1232          Toileting Goal 1 (OT)    Activity/Device (Toileting Goal 1, OT) toileting skills, all  -AC     Dougherty Level/Cues Needed (Toileting Goal 1, OT) modified independence  -AC     Time Frame (Toileting Goal 1, OT) long term goal (LTG);10 days  -AC       Row Name 01/28/25 1232          Strength Goal 1 (OT)    Strength Goal 1 (OT) patient will demonstrate BUE strength of 5/5 for adls.  -AC     Time Frame (Strength Goal 1, OT) long term goal (LTG);10 days  -AC       Row Name 01/28/25 1232          Problem Specific Goal 1 (OT)    Problem Specific Goal 1 (OT) Patient will demonstrate good activity tolerance for adls.  -AC     Time Frame (Problem Specific Goal 1, OT) long term goal (LTG);10  days  -       Row Name 01/28/25 1232          Therapy Assessment/Plan (OT)    Planned Therapy Interventions (OT) activity tolerance training;functional balance retraining;occupation/activity based interventions;ROM/therapeutic exercise;transfer/mobility retraining;patient/caregiver education/training;BADL retraining  -               User Key  (r) = Recorded By, (t) = Taken By, (c) = Cosigned By      Initials Name Provider Type     Luh Sears OT Occupational Therapist                   Clinical Impression       Row Name 01/28/25 1231          Pain Assessment    Pretreatment Pain Rating 0/10 - no pain  -     Posttreatment Pain Rating 0/10 - no pain  -       Row Name 01/28/25 1231          Plan of Care Review    Plan of Care Reviewed With patient  -     Progress no change  -     Outcome Evaluation Patient presents with balance, endurance and strength limitations that impede his/her ability to perform ADLS. The skills of a therapist are necessary to maximize independence with ADLs.  -       Row Name 01/28/25 1231          Therapy Assessment/Plan (OT)    Patient/Family Therapy Goal Statement (OT) Patient would like to maximize independence with adls.  -     Rehab Potential (OT) good  -     Criteria for Skilled Therapeutic Interventions Met (OT) yes;meets criteria;skilled treatment is necessary  -     Therapy Frequency (OT) 5 times/wk  -       Row Name 01/28/25 1231          Therapy Plan Review/Discharge Plan (OT)    Equipment Needs Upon Discharge (OT) walker, rolling;commode chair  -     Anticipated Discharge Disposition (OT) inpatient rehabilitation facility  -       Row Name 01/28/25 1231          Positioning and Restraints    Pre-Treatment Position sitting in chair/recliner  -     Post Treatment Position chair  -     In Chair call light within reach;encouraged to call for assist;exit alarm on;reclined;legs elevated  -               User Key  (r) = Recorded By, (t) = Taken By, (c)  = Cosigned By      Initials Name Provider Type    Luh Alves OT Occupational Therapist                   Outcome Measures       Row Name 01/28/25 1234          How much help from another is currently needed...    Putting on and taking off regular lower body clothing? 3  -AC     Bathing (including washing, rinsing, and drying) 3  -AC     Toileting (which includes using toilet bed pan or urinal) 3  -AC     Putting on and taking off regular upper body clothing 4  -AC     Taking care of personal grooming (such as brushing teeth) 4  -AC     Eating meals 4  -AC     AM-PAC 6 Clicks Score (OT) 21  -AC       Row Name 01/28/25 0808          How much help from another person do you currently need...    Turning from your back to your side while in flat bed without using bedrails? 3  -CW     Moving from lying on back to sitting on the side of a flat bed without bedrails? 3  -CW     Moving to and from a bed to a chair (including a wheelchair)? 2  -CW     Standing up from a chair using your arms (e.g., wheelchair, bedside chair)? 3  -CW     Climbing 3-5 steps with a railing? 1  -CW     To walk in hospital room? 2  -CW     AM-PAC 6 Clicks Score (PT) 14  -CW       Row Name 01/28/25 1234          Functional Assessment    Outcome Measure Options AM-PAC 6 Clicks Daily Activity (OT);Optimal Instrument  -AC       Row Name 01/28/25 1234          Optimal Instrument    Optimal Instrument Optimal - 3  -AC     Bending/Stooping 3  -AC     Standing 2  -AC     Reaching 1  -AC     From the list, choose the 3 activities you would most like to be able to do without any difficulty Bending/stooping;Standing;Reaching  -AC     Total Score Optimal - 3 6  -AC               User Key  (r) = Recorded By, (t) = Taken By, (c) = Cosigned By      Initials Name Provider Type    Luz Marina Jacinto, RN Registered Nurse    Luh Alves OT Occupational Therapist                    Occupational Therapy Education       Title: PT OT SLP Therapies (Done)        Topic: Occupational Therapy (Done)       Point: ADL training (Done)       Description:   Instruct learner(s) on proper safety adaptation and remediation techniques during self care or transfers.   Instruct in proper use of assistive devices.                  Learning Progress Summary            Patient Acceptance, E, VU by  at 1/28/2025 1235                      Point: Home exercise program (Done)       Description:   Instruct learner(s) on appropriate technique for monitoring, assisting and/or progressing therapeutic exercises/activities.                  Learning Progress Summary            Patient Acceptance, E, VU by  at 1/28/2025 1235                      Point: Precautions (Done)       Description:   Instruct learner(s) on prescribed precautions during self-care and functional transfers.                  Learning Progress Summary            Patient Acceptance, E, VU by  at 1/28/2025 1235                      Point: Body mechanics (Done)       Description:   Instruct learner(s) on proper positioning and spine alignment during self-care, functional mobility activities and/or exercises.                  Learning Progress Summary            Patient Acceptance, E, VU by  at 1/28/2025 1235                                      User Key       Initials Effective Dates Name Provider Type Discipline     06/16/21 -  Luh Sears OT Occupational Therapist OT                  OT Recommendation and Plan  Planned Therapy Interventions (OT): activity tolerance training, functional balance retraining, occupation/activity based interventions, ROM/therapeutic exercise, transfer/mobility retraining, patient/caregiver education/training, BADL retraining  Therapy Frequency (OT): 5 times/wk  Plan of Care Review  Plan of Care Reviewed With: patient  Progress: no change  Outcome Evaluation: Patient presents with balance, endurance and strength limitations that impede his/her ability to perform ADLS. The skills of a  therapist are necessary to maximize independence with ADLs.     Time Calculation:   Evaluation Complexity (OT)  Review Occupational Profile/Medical/Therapy History Complexity: brief/low complexity  Assessment, Occupational Performance/Identification of Deficit Complexity: 1-3 performance deficits  Clinical Decision Making Complexity (OT): problem focused assessment/low complexity  Overall Complexity of Evaluation (OT): low complexity     Time Calculation- OT       Row Name 01/28/25 1236             Time Calculation- OT    OT Received On 01/28/25  -AC      OT Goal Re-Cert Due Date 02/06/25  -AC         Untimed Charges    OT Eval/Re-eval Minutes 27  -AC         Total Minutes    Untimed Charges Total Minutes 27  -AC       Total Minutes 27  -AC                User Key  (r) = Recorded By, (t) = Taken By, (c) = Cosigned By      Initials Name Provider Type    AC Luh Sears OT Occupational Therapist                  Therapy Charges for Today       Code Description Service Date Service Provider Modifiers Qty    [id number] HC OT EVAL LOW COMPLEXITY 2 1/28/2025 Luh Sears OT GO 1                 Luh Sears OT  1/28/2025

## 2025-01-28 NOTE — PROGRESS NOTES
" LOS: 0 days   Patient Care Team:  Brennan Mosqueda MD as PCP - General (Internal Medicine)  Regis Mckeon MD as Consulting Physician (Radiation Oncology)  Carlton Casillas MD as Consulting Physician (Gastroenterology)  Susan Marcos APRN as Nurse Practitioner (Gastroenterology)    Chief Complaint: ESRD    Subjective     Pt without any acute complaints  Sitting up in chair  No problems on dialysis.    History taken from: patient chart RN    Objective     Vital Sign Min/Max for last 24 hours  Temp  Min: 97.5 °F (36.4 °C)  Max: 99.5 °F (37.5 °C)   BP  Min: 74/42  Max: 173/69   Pulse  Min: 72  Max: 86   Resp  Min: 14  Max: 20   SpO2  Min: 95 %  Max: 100 %   Flow (L/min) (Oxygen Therapy)  Min: 4  Max: 4   No data recorded     Flowsheet Rows      Flowsheet Row First Filed Value   Admission Height 162.6 cm (64\") Documented at 01/26/2025 2147   Admission Weight 66.6 kg (146 lb 13.2 oz) Documented at 01/26/2025 2147            No intake/output data recorded.  I/O last 3 completed shifts:  In: 960 [P.O.:960]  Out: 2000     Objective:  General Appearance:  Comfortable.    Vital signs: (most recent): Blood pressure 136/40, pulse 76, temperature 99 °F (37.2 °C), temperature source Oral, resp. rate 14, height 165.1 cm (65\"), weight 64.5 kg (142 lb 3.2 oz), SpO2 95%, not currently breastfeeding.  Vital signs are normal.    HEENT: Normal HEENT exam.    Lungs:  Normal effort and normal respiratory rate.    Heart: Normal rate.  Regular rhythm.    Abdomen: Abdomen is soft.  Bowel sounds are normal.   There is no abdominal tenderness.     Extremities: Normal range of motion.  There is no dependent edema.    Pulses: Distal pulses are intact.    Neurological: Patient is alert and oriented to person, place and time.    Pupils:  Pupils are equal, round, and reactive to light.    Skin:  Warm and dry.                Results Review:     I reviewed the patient's new clinical results.  I reviewed the patient's new " "imaging results and agree with the interpretation.  I reviewed the patient's other test results and agree with the interpretation    WBC WBC   Date Value Ref Range Status   01/28/2025 4.64 3.40 - 10.80 10*3/mm3 Final   01/27/2025 5.44 3.40 - 10.80 10*3/mm3 Final   01/26/2025 3.90 3.40 - 10.80 10*3/mm3 Final      HGB Hemoglobin   Date Value Ref Range Status   01/28/2025 8.1 (L) 12.0 - 15.9 g/dL Final   01/27/2025 8.5 (L) 12.0 - 15.9 g/dL Final   01/26/2025 7.5 (L) 12.0 - 15.9 g/dL Final      HCT Hematocrit   Date Value Ref Range Status   01/28/2025 25.9 (L) 34.0 - 46.6 % Final   01/27/2025 30.2 (L) 34.0 - 46.6 % Final   01/26/2025 26.5 (L) 34.0 - 46.6 % Final      Platlets No results found for: \"LABPLAT\"   MCV MCV   Date Value Ref Range Status   01/28/2025 108.4 (H) 79.0 - 97.0 fL Final   01/27/2025 109.4 (H) 79.0 - 97.0 fL Final   01/26/2025 109.5 (H) 79.0 - 97.0 fL Final          Sodium Sodium   Date Value Ref Range Status   01/28/2025 139 136 - 145 mmol/L Final   01/26/2025 145 136 - 145 mmol/L Final      Potassium Potassium   Date Value Ref Range Status   01/28/2025 4.2 3.5 - 5.2 mmol/L Final   01/26/2025 5.2 3.5 - 5.2 mmol/L Final     Comment:     Slight hemolysis detected by analyzer. Result may be falsely elevated.      Chloride Chloride   Date Value Ref Range Status   01/28/2025 106 98 - 107 mmol/L Final   01/26/2025 105 98 - 107 mmol/L Final      CO2 CO2   Date Value Ref Range Status   01/28/2025 24.1 22.0 - 29.0 mmol/L Final   01/26/2025 28.8 22.0 - 29.0 mmol/L Final      BUN BUN   Date Value Ref Range Status   01/28/2025 18 8 - 23 mg/dL Final   01/26/2025 34 (H) 8 - 23 mg/dL Final      Creatinine Creatinine   Date Value Ref Range Status   01/28/2025 3.47 (H) 0.57 - 1.00 mg/dL Final   01/26/2025 4.70 (H) 0.57 - 1.00 mg/dL Final      Calcium Calcium   Date Value Ref Range Status   01/28/2025 8.0 (L) 8.6 - 10.5 mg/dL Final   01/26/2025 7.8 (L) 8.6 - 10.5 mg/dL Final      PO4 No results found for: \"CAPO4\" " "  Albumin Albumin   Date Value Ref Range Status   01/28/2025 3.0 (L) 3.5 - 5.2 g/dL Final   01/26/2025 3.3 (L) 3.5 - 5.2 g/dL Final      Magnesium Magnesium   Date Value Ref Range Status   01/28/2025 2.0 1.6 - 2.4 mg/dL Final      Uric Acid No results found for: \"URICACID\"     Medication Review:   fluticasone, 1 spray, Nasal, Daily  ipratropium-albuterol, 3 mL, Nebulization, Q4H - RT  levothyroxine, 50 mcg, Oral, Q AM  rosuvastatin, 20 mg, Oral, Nightly  sodium chloride, 10 mL, Intravenous, Q12H          Assessment & Plan       Weakness    Peripheral vascular disease of lower extremity      Assessment & Plan  - ESRD, dialysis 3 times a week at [facility] through right IJ TDC.  Continue same m/w/f.    - Hypertension, improved after dialysis, resume blood pressure medications per home dosing and monitor    - Anemia, severe, iron studies were equivocal low TSAT but high ferritin possibly inflammatory state.  Transfuse if needed, on long-acting CASSANDRA as outpatient.    - Severe peripheral arterial disease as noted on CT angiogram earlier today.  Per primary.    - Possible lung scarring versus consolidation/mass, pulmonary consulted.    - Hypothyroidism, per primary.  Type 2 diabetes with complications, per primary.    - Diastolic dysfunction/LVH and aortic stenosis.    - Renal mass, followed by urology as outpatient.    Elliott Aviles MD  01/28/25  07:51 EST          "

## 2025-01-28 NOTE — PLAN OF CARE
Goal Outcome Evaluation:  Plan of Care Reviewed With: patient        Progress: no change  Outcome Evaluation: Patient presents with balance, endurance and strength limitations that impede his/her ability to perform ADLS. The skills of a therapist are necessary to maximize independence with ADLs.    Anticipated Discharge Disposition (OT): inpatient rehabilitation facility

## 2025-01-28 NOTE — PROGRESS NOTES
[facility]     Progress Note    Patient Name: Charlette Rai  : 1935  MRN: [id number]  Primary Care Physician:  Brennan Mosqueda MD  Date of admission: 2025    Subjective   Subjective     Chief Complaint: Follow-up for pulmonary nodule    History of Present Illness  Patient Reports interested in biopsy  On 4 L    Review of Systems    Objective   Objective     Vitals:   Temp:  [98.2 °F (36.8 °C)-99.5 °F (37.5 °C)] 98.4 °F (36.9 °C)  Heart Rate:  [70-78] 73  Resp:  [14-16] 16  BP: ()/(35-82) 141/81  Flow (L/min) (Oxygen Therapy):  [4] 4    Physical Exam   Resting comfortably  Alert and orient  In no acute distress  Result Review    Result Review:  I have personally reviewed the results from the time of this admission to 2025 17:08 EST and agree with these findings:  [x]  Laboratory list / accordion  []  Microbiology  []  Radiology  []  EKG/Telemetry   []  Cardiology/Vascular   []  Pathology  []  Old records  []  Other:  Most notable findings include: CT scan of the chest showing concerning right lower lobe pulmonary nodule      Assessment & Plan   Assessment / Plan     Brief Patient Summary:  Charlette Rai is a 89 y.o. female who admitted to the hospital found to have abnormal CT scan of the chest    Active Hospital Problems:  Active Hospital Problems    Diagnosis     **Weakness     Peripheral vascular disease of lower extremity    Right lower lobe pulmonary nodule    Plan:   Reached out and spoke with IR  Will order IR guided biopsy to this lesion  Discussed risk versus benefits including bleeding, pneumothorax, infection  Will order for this test to be done tomorrow if possible  Does have history of lung cancer  Does have significant smoking history    VTE Prophylaxis:  Pharmacologic VTE prophylaxis orders are present.        CODE STATUS:    Code Status (Patient has no pulse and is not breathing): CPR (Attempt to Resuscitate)  Medical Interventions (Patient has pulse or is  breathing): Full Support      Regis Holcomb DO    Electronically signed by Regis Holcomb DO, 01/28/25, 5:09 PM EST.

## 2025-01-28 NOTE — PLAN OF CARE
Goal Outcome Evaluation:           Progress: no change     Patient is alert and oriented x 4 on 4L nasal cannula, baseline, and had no complaints of pain. See heparin drip documentation on MAR. Currently laying in bed with eyes closed, breathing deeply on 4L nasal cannula, no needs voiced, call bell in reach.

## 2025-01-28 NOTE — ANESTHESIA PREPROCEDURE EVALUATION
Anesthesia Evaluation     Patient summary reviewed and Nursing notes reviewed   no history of anesthetic complications:   NPO Solid Status: > 8 hours  NPO Liquid Status: > 8 hours           Airway   Mallampati: II  TM distance: >3 FB  Neck ROM: full  No difficulty expected  Dental - normal exam     Pulmonary - normal exam    breath sounds clear to auscultation  (+) lung cancer (2005 s/p chemo xrt Left upper lobe), COPD moderate, asthma,home oxygen (3lpm NC), shortness of breath  Cardiovascular   Exercise tolerance: poor (<4 METS)    ECG reviewed  PT is on anticoagulation therapy  Rhythm: regular  Rate: abnormal    (+) hypertension, valvular problems/murmurs murmur, AS and MS, CAD, CHF , murmur, PVD, DVT resolved, hyperlipidemia,  carotid artery disease carotid bilateral    ROS comment: Limited Transthoracic Echocardiogram with Limited Doppler and Color Flow    Accession Number: [id number]  Date of Study: 8/24/24  Ordering Provider: Benoit Urbina MD  Clinical Indications: Arrythmia, Murmur or Click, Suspicion of Valvular Heart Disease - Evaluation of moderate to severe AS, VTach/VFib      Reading Physicians  Performing Staff  Cardiology: Ildefonso Harden MD     Tech: Sofia Wade      Patient Hx Of Height, Weight, and Vitals    Height Weight BSA (Calculated - sq m) BMI (Calculated) Retired BMI (kg/m2) Pulse BP       77 145/59     [facility]     Show images for Adult Transthoracic Echo Limited W/ Cont if Necessary Per Protocol  Clinical Indication    Arrythmia; Murmur or Click; Suspicion of Valvular Heart Disease - Evaluation of moderate to severe AS; VTach/VFib    Interpretation Summary       ·  Left ventricular systolic function is low normal. Left ventricular ejection fraction appears to be 51 - 55%.  ·  Left ventricular wall thickness is consistent with mild concentric hypertrophy.  ·  The left atrial cavity is mild to moderately dilated.  ·  Aortic valve is moderately calcified.  Mild to moderate  aortic valve stenosis is present.  The peak and mean gradient across aortic valve are 21/15 mmHg with a calculated valve area of 1.64 cm².  ·  Mitral valve apparatus is calcified with restricted opening.  Possible mitral stenosis, unable to assess severity.  Adequate Doppler studies not obtained across the mitral valve.         Neuro/Psych  (+) headaches, numbness  GI/Hepatic/Renal/Endo    (+) GERD well controlled, PUD, renal disease- CRI, ESRD and dialysis, diabetes mellitus type 2, thyroid problem hypothyroidism and thyroid nodules    Musculoskeletal     (+) back pain  Abdominal    Substance History - negative use     OB/GYN negative ob/gyn ROS         Other   arthritis,   history of cancer remission    ROS/Med Hx Other: Dialysis later today              Phys Exam Other: 100% on 3 lpm via Nc    Right IJ Tunnel Dialysis Catheter                       Anesthesia Plan    ASA 4     general, MAC and Na     (01/28/25 05:11  ABO Type: O  RH type: Positive  Antibody Screen: Negative  T&S Expiration Date: 1/31/2025 11:59:59 PM  Sodium: 139  Potassium: 4.2  Chloride: 106  CO2: 24.1  Anion Gap: 8.9  BUN: 18  Creatinine: 3.47 (H)  BUN/Creatinine Ratio: 5.2 (L)  eGFR: 12.3 (L)  Glucose: 102 (H)  Calcium: 8.0 (L)  Magnesium: 2.0  Phosphorus: 4.8 (H)  Albumin: 3.0 (L)  PTT: 151.7 (C)  WBC: 4.64  RBC: 2.39 (L)  Hemoglobin: 8.1 (L)  Hematocrit: 25.9 (L)  Platelets: 138 (L)    1/28/25  1315 H  PTT 46.9    Patient understands anesthesia not responsible for dental damage.  )  intravenous induction     Anesthetic plan, risks, benefits, and alternatives have been provided, discussed and informed consent has been obtained with: patient.  Pre-procedure education provided  Plan discussed with CRNA.

## 2025-01-28 NOTE — PROGRESS NOTES
[facility]   Hospitalist Progress Note  Date: 2025  Patient Name: Charlette Rai  : 1935  MRN: [id number]  Date of admission: 2025  Room/Bed: [facility]3/1      Subjective   Subjective     Chief Complaint: Weakness    Summary:Charlette Rai is a 89 y.o. female past medical history of ESRD on HD , peripheral arterial disease status post lower extremity stenting, renal artery stenosis status post tenting, hypothyroidism, COPD on 3 L nasal cannula, heart failure preserved ejection fraction, prior lung cancer status post chemotherapy and right upper lobectomy who presents to the ER due to new onset left lower extremity swelling with pain and weakness.  Patient states that roughly 4 days ago she started noticed increasing swelling below her left knee and has progressively worsened in the last 3 days to the point that it is up to her hip.  In that time she was also had progressive weakness and inability bear weight on her left lower extremity due to pain that ultimately prompted her family to advise her to come into the ER tonight for evaluation     On arrival patient was hemodynamically stable and on her home level of oxygen.  Lab workup was revealing of a mildly elevated INR of 1.2, chronically elevated creatinine of 4.7 and chronic anemia with hemoglobin of 7.5 as well as platelets of 116.  Hip imaging was done and did not reveal any fractures and a an ultrasound of the left lower extremity is pending at this time.  Attempt was made to ambulate the patient in the ER but this was unsuccessful as she has difficulty bearing weight on this and as a result hospitalist was then contacted for admission for deconditioning and debility related to a possible DVT in the left lower extremity.  Signed my exam patient is resting comfortably and is able to lift her leg in bed but states that she cannot bear any weight on it.  Denies having any trauma recently.  No recent surgery to the left  lower extremity.  Denies any history of DVTs.  She has not missed any dialysis.    Interval Followup: No acute overnight events.  No acute distress.  Patient resting comfortably in bed. She does feel like she is breathing a little bit easier.  Still has left leg pain.      Objective   Objective     Vitals:   Temp:  [98.2 °F (36.8 °C)-99.5 °F (37.5 °C)] 98.4 °F (36.9 °C)  Heart Rate:  [70-78] 73  Resp:  [14-16] 16  BP: ()/(35-82) 141/81  Flow (L/min) (Oxygen Therapy):  [4] 4    Physical Exam   Gen: NAD, Alert and Oriented  Pulm: Nasal cannula, no respiratory distress  Abd: soft, nondistended  Extremities: no pitting edema    Result Review    Result Review:  I have personally reviewed these results:  [x]  Laboratory      Lab 01/28/25  1315 01/28/25  0511 01/27/25  2203 01/27/25  1416 01/26/25 2204   WBC  --  4.64  --  5.44 3.90   HEMOGLOBIN  --  8.1*  --  8.5* 7.5*   HEMATOCRIT  --  25.9*  --  30.2* 26.5*   PLATELETS  --  138*  --  127* 116*   NEUTROS ABS  --  2.90  --  4.14 2.35   IMMATURE GRANS (ABS)  --  0.01  --  0.02 0.01   LYMPHS ABS  --  0.75  --  0.49* 0.65*   MONOS ABS  --  0.82  --  0.63 0.72   EOS ABS  --  0.14  --  0.14 0.15   MCV  --  108.4*  --  109.4* 109.5*   PROTIME 15.3*  --   --  14.1 15.4*   APTT 46.9* 151.7* 47.2* 29.9* 32.0         Lab 01/28/25  0511 01/26/25 2204   SODIUM 139 145   POTASSIUM 4.2 5.2   CHLORIDE 106 105   CO2 24.1 28.8   ANION GAP 8.9 11.2   BUN 18 34*   CREATININE 3.47* 4.70*   EGFR 12.3* 8.5*   GLUCOSE 102* 188*   CALCIUM 8.0* 7.8*   MAGNESIUM 2.0  --    PHOSPHORUS 4.8*  --          Lab 01/28/25  0511 01/26/25 2204   TOTAL PROTEIN  --  5.8*   ALBUMIN 3.0* 3.3*   GLOBULIN  --  2.5   ALT (SGPT)  --  14   AST (SGOT)  --  25   BILIRUBIN  --  0.2   ALK PHOS  --  292*         Lab 01/28/25  1315 01/27/25  1416 01/26/25 2204   PROTIME 15.3* 14.1 15.4*   INR 1.18* 1.07 1.20*             Lab 01/28/25  0511 01/27/25  1416 01/26/25 2204   IRON  --   --  41   IRON SATURATION  (TSAT)  --   --  17*   TIBC  --   --  235*   TRANSFERRIN  --   --  158*   FERRITIN  --   --  1,048.00*   ABO TYPING O   < >  --    RH TYPING Positive   < >  --    ANTIBODY SCREEN Negative  --   --     < > = values in this interval not displayed.         Brief Urine Lab Results  (Last result in the past 365 days)        Color   Clarity   Blood   Leuk Est   Nitrite   Protein   CREAT   Urine HCG        05/01/24 2102 Dark Yellow   Cloudy   Negative   Small (1+)   Negative   >=300 mg/dL (3+)                 [x]  Microbiology   Microbiology Results (last 10 days)       ** No results found for the last 240 hours. **          [x]  Radiology  CT Chest Without Contrast Diagnostic    Result Date: 1/28/2025     1. There is a new irregular 3.3 cm subpleural opacity in the right lower lobe. It may be partially cavitary. A malignant process is possible. Consider biopsy for further evaluation. Nuclear Medicine (NM) whole-body FDG-PET-CT scan may be helpful in further evaluation, as well.  2. Please see above comments for further detail.    Portions of this note were completed with a voice recognition program.  Electronically Signed By-Chicho Jones MD On:1/28/2025 4:46 AM      CT Angio Abdominal Aorta Bilateral Iliofem Runoff    Result Date: 1/27/2025  Impression: 1. Chronic bilateral SFA occlusions stable since 2020. Bilateral distal at the level of the popliteal arteries through chronic developed collaterals, with three-vessel runoff to the level of the ankle bilaterally. 2. Severe intra-abdominal atherosclerotic disease including high-grade stenosis of proximal celiac artery, proximal SMA and proximal right renal artery. Mild ectatic dilation of the infrarenal abdominal aorta. All vascular stents in the abdomen are patent. No evidence of solid organ or hollow viscus ischemia. 3. Stable left common femoral artery pseudoaneurysm since 5/31/2024. 4. Masslike right lower lobe consolidation which could reflect a focal pneumonia  versus potential neoplasm in patient with prior history of lung malignancy. Consider pulmonary consultation for further management. 5. Findings suggesting volume overload/third spacing including small right pleural effusion, interstitial pulmonary edema and right lower lobe groundglass suspicious for early alveolar edema versus nonspecific infectious/inflammatory process. Body wall and bilateral lower extremity edema, left greater than right. 6. Cholelithiasis. 7. Numerous additional chronic/ancillary findings as above. Electronically Signed: Ildefonso Purcell MD  1/27/2025 1:11 PM EST  Workstation ID: [id number]    XR Hip With or Without Pelvis 2 - 3 View Left    Result Date: 1/27/2025  No acute fracture or acute malalignment is identified.   Portions of this note were completed with a voice recognition program.   Electronically Signed By-Chicho Jones MD On:1/27/2025 5:15 AM     []  EKG/Telemetry   []  Cardiology/Vascular   []  Pathology  []  Old records  []  Other:    Assessment & Plan   Assessment / Plan     Assessment:  Left lower extremity pain  Left lower extremity weakness secondary to pain  Chronic hypoxic respiratory failure, 3 L baseline  HFpEF, not in acute exacerbation  COPD, not in acute exacerbation  ESRD on HD MWF  Chronic microcytic anemia  Hypertension  History of JONAH s/p stenting  PAD s/p lower extremity stenting  Hypothyroidism  Recurrent Pseudomonas pneumonia, history JUAN nebs  GERD  History of lung cancer with new pulmonary nodule    Plan:  Continue inpatient admission  Pulmonology consulted.  CT chest with new irregular 3.3 cm subpleural right lower lobe lesion.  IR guided biopsy ordered.  Nephrology following.  Continue dialysis MWF.  Right IJ TDC.  Vascular following.  Patient has multiple chronic occlusions.  Repeat ABIs pending.  Possible angiogram/angioplasty while inpatient.  Continue Jessica Pulmicort  Continue JUAN nebs, looks like it was filled in December, no recent pulm note  outpatient, will confirm with pulmonology team if she still needs this  Home meds reviewed.  Continue aspirin, hold Plavix for possible biopsy.  Hemoglobin stable  Platelets improved  PT/OT  A.m. labs       Discussed with RN.    VTE Prophylaxis:  Pharmacologic VTE prophylaxis orders are present.        CODE STATUS:   Code Status (Patient has no pulse and is not breathing): CPR (Attempt to Resuscitate)  Medical Interventions (Patient has pulse or is breathing): Full Support      Electronically signed by Dariana Valentin DO, 1/28/2025, 17:28 EST.

## 2025-01-29 ENCOUNTER — ANCILLARY PROCEDURE (OUTPATIENT)
Dept: PERIOP | Facility: HOSPITAL | Age: 89
End: 2025-01-29
Payer: MEDICARE

## 2025-01-29 ENCOUNTER — ANESTHESIA (OUTPATIENT)
Dept: PERIOP | Facility: HOSPITAL | Age: 89
End: 2025-01-29
Payer: MEDICARE

## 2025-01-29 LAB
ALBUMIN SERPL-MCNC: 2.4 G/DL (ref 3.5–5.2)
ANION GAP SERPL CALCULATED.3IONS-SCNC: 14.8 MMOL/L (ref 5–15)
BUN SERPL-MCNC: 32 MG/DL (ref 8–23)
BUN/CREAT SERPL: 6.9 (ref 7–25)
BURR CELLS BLD QL SMEAR: ABNORMAL
CALCIUM SPEC-SCNC: 7.7 MG/DL (ref 8.6–10.5)
CHLORIDE SERPL-SCNC: 104 MMOL/L (ref 98–107)
CO2 SERPL-SCNC: 16.2 MMOL/L (ref 22–29)
CREAT SERPL-MCNC: 4.62 MG/DL (ref 0.57–1)
DEPRECATED RDW RBC AUTO: 63 FL (ref 37–54)
EGFRCR SERPLBLD CKD-EPI 2021: 8.7 ML/MIN/1.73
EOSINOPHIL # BLD MANUAL: 0.08 10*3/MM3 (ref 0–0.4)
EOSINOPHIL NFR BLD MANUAL: 2 % (ref 0.3–6.2)
ERYTHROCYTE [DISTWIDTH] IN BLOOD BY AUTOMATED COUNT: 15.8 % (ref 12.3–15.4)
GLUCOSE BLDC GLUCOMTR-MCNC: 90 MG/DL (ref 70–99)
GLUCOSE SERPL-MCNC: 80 MG/DL (ref 65–99)
HCT VFR BLD AUTO: 26.3 % (ref 34–46.6)
HGB BLD-MCNC: 7.3 G/DL (ref 12–15.9)
LYMPHOCYTES # BLD MANUAL: 0.53 10*3/MM3 (ref 0.7–3.1)
LYMPHOCYTES NFR BLD MANUAL: 4 % (ref 5–12)
MACROCYTES BLD QL SMEAR: ABNORMAL
MAGNESIUM SERPL-MCNC: 2.1 MG/DL (ref 1.6–2.4)
MCH RBC QN AUTO: 31.2 PG (ref 26.6–33)
MCHC RBC AUTO-ENTMCNC: 27.8 G/DL (ref 31.5–35.7)
MCV RBC AUTO: 112.4 FL (ref 79–97)
METAMYELOCYTES NFR BLD MANUAL: 3 % (ref 0–0)
MONOCYTES # BLD: 0.16 10*3/MM3 (ref 0.1–0.9)
MYELOCYTES NFR BLD MANUAL: 1 % (ref 0–0)
NEUTROPHILS # BLD AUTO: 3.15 10*3/MM3 (ref 1.7–7)
NEUTROPHILS NFR BLD MANUAL: 76 % (ref 42.7–76)
NEUTS BAND NFR BLD MANUAL: 1 % (ref 0–5)
PHOSPHATE SERPL-MCNC: 5.3 MG/DL (ref 2.5–4.5)
PLATELET # BLD AUTO: 101 10*3/MM3 (ref 140–450)
PMV BLD AUTO: 10.4 FL (ref 6–12)
POTASSIUM SERPL-SCNC: 4.3 MMOL/L (ref 3.5–5.2)
RBC # BLD AUTO: 2.34 10*6/MM3 (ref 3.77–5.28)
SCAN SLIDE: NORMAL
SMALL PLATELETS BLD QL SMEAR: ABNORMAL
SODIUM SERPL-SCNC: 135 MMOL/L (ref 136–145)
VARIANT LYMPHS NFR BLD MANUAL: 1 % (ref 0–5)
VARIANT LYMPHS NFR BLD MANUAL: 12 % (ref 19.6–45.3)
WBC MORPH BLD: NORMAL
WBC NRBC COR # BLD AUTO: 4.09 10*3/MM3 (ref 3.4–10.8)

## 2025-01-29 PROCEDURE — 25010000002 LIDOCAINE PF 2% 2 % SOLUTION

## 2025-01-29 PROCEDURE — 25510000002 IOVERSOL 68 % SOLUTION: Performed by: SURGERY

## 2025-01-29 PROCEDURE — 94799 UNLISTED PULMONARY SVC/PX: CPT

## 2025-01-29 PROCEDURE — 75710 ARTERY X-RAYS ARM/LEG: CPT

## 2025-01-29 PROCEDURE — 85025 COMPLETE CBC W/AUTO DIFF WBC: CPT | Performed by: STUDENT IN AN ORGANIZED HEALTH CARE EDUCATION/TRAINING PROGRAM

## 2025-01-29 PROCEDURE — 25010000002 LIDOCAINE 1 % SOLUTION 10 ML VIAL: Performed by: SURGERY

## 2025-01-29 PROCEDURE — C1894 INTRO/SHEATH, NON-LASER: HCPCS | Performed by: SURGERY

## 2025-01-29 PROCEDURE — C1887 CATHETER, GUIDING: HCPCS | Performed by: SURGERY

## 2025-01-29 PROCEDURE — 83735 ASSAY OF MAGNESIUM: CPT | Performed by: STUDENT IN AN ORGANIZED HEALTH CARE EDUCATION/TRAINING PROGRAM

## 2025-01-29 PROCEDURE — 36140 INTRO NDL ICATH UPR/LXTR ART: CPT | Performed by: SURGERY

## 2025-01-29 PROCEDURE — 76937 US GUIDE VASCULAR ACCESS: CPT | Performed by: SURGERY

## 2025-01-29 PROCEDURE — 25010000002 HYDROMORPHONE 1 MG/ML SOLUTION: Performed by: STUDENT IN AN ORGANIZED HEALTH CARE EDUCATION/TRAINING PROGRAM

## 2025-01-29 PROCEDURE — 25010000002 HYDROMORPHONE 1 MG/ML SOLUTION

## 2025-01-29 PROCEDURE — C1769 GUIDE WIRE: HCPCS | Performed by: SURGERY

## 2025-01-29 PROCEDURE — 25010000002 BUPIVACAINE (PF) 0.25 % SOLUTION 10 ML VIAL: Performed by: SURGERY

## 2025-01-29 PROCEDURE — 75710 ARTERY X-RAYS ARM/LEG: CPT | Performed by: SURGERY

## 2025-01-29 PROCEDURE — 25010000002 FENTANYL CITRATE (PF) 50 MCG/ML SOLUTION

## 2025-01-29 PROCEDURE — 82948 REAGENT STRIP/BLOOD GLUCOSE: CPT

## 2025-01-29 PROCEDURE — 25010000002 MIDAZOLAM PER 1MG

## 2025-01-29 PROCEDURE — 25010000002 MORPHINE PER 10 MG: Performed by: STUDENT IN AN ORGANIZED HEALTH CARE EDUCATION/TRAINING PROGRAM

## 2025-01-29 PROCEDURE — 75774 ARTERY X-RAY EACH VESSEL: CPT | Performed by: SURGERY

## 2025-01-29 PROCEDURE — 99232 SBSQ HOSP IP/OBS MODERATE 35: CPT | Performed by: INTERNAL MEDICINE

## 2025-01-29 PROCEDURE — 99232 SBSQ HOSP IP/OBS MODERATE 35: CPT | Performed by: STUDENT IN AN ORGANIZED HEALTH CARE EDUCATION/TRAINING PROGRAM

## 2025-01-29 PROCEDURE — 25810000003 SODIUM CHLORIDE 0.9 % SOLUTION: Performed by: ANESTHESIOLOGY

## 2025-01-29 PROCEDURE — B41G1ZZ FLUOROSCOPY OF LEFT LOWER EXTREMITY ARTERIES USING LOW OSMOLAR CONTRAST: ICD-10-PCS | Performed by: STUDENT IN AN ORGANIZED HEALTH CARE EDUCATION/TRAINING PROGRAM

## 2025-01-29 PROCEDURE — 25010000002 CEFAZOLIN PER 500 MG

## 2025-01-29 PROCEDURE — 85007 BL SMEAR W/DIFF WBC COUNT: CPT | Performed by: STUDENT IN AN ORGANIZED HEALTH CARE EDUCATION/TRAINING PROGRAM

## 2025-01-29 PROCEDURE — 25010000002 HEPARIN (PORCINE) PER 1000 UNITS: Performed by: SURGERY

## 2025-01-29 PROCEDURE — 80069 RENAL FUNCTION PANEL: CPT | Performed by: STUDENT IN AN ORGANIZED HEALTH CARE EDUCATION/TRAINING PROGRAM

## 2025-01-29 RX ORDER — ASPIRIN 81 MG/1
81 TABLET, CHEWABLE ORAL DAILY
Status: DISCONTINUED | OUTPATIENT
Start: 2025-01-29 | End: 2025-02-01 | Stop reason: HOSPADM

## 2025-01-29 RX ORDER — FENTANYL CITRATE 50 UG/ML
INJECTION, SOLUTION INTRAMUSCULAR; INTRAVENOUS AS NEEDED
Status: DISCONTINUED | OUTPATIENT
Start: 2025-01-29 | End: 2025-01-29 | Stop reason: SURG

## 2025-01-29 RX ORDER — MIDAZOLAM HYDROCHLORIDE 2 MG/2ML
INJECTION, SOLUTION INTRAMUSCULAR; INTRAVENOUS AS NEEDED
Status: DISCONTINUED | OUTPATIENT
Start: 2025-01-29 | End: 2025-01-29 | Stop reason: SURG

## 2025-01-29 RX ORDER — HYDROCODONE BITARTRATE AND ACETAMINOPHEN 5; 325 MG/1; MG/1
1 TABLET ORAL EVERY 4 HOURS PRN
Status: DISCONTINUED | OUTPATIENT
Start: 2025-01-29 | End: 2025-02-01 | Stop reason: HOSPADM

## 2025-01-29 RX ORDER — NITROGLYCERIN 0.4 MG/1
0.4 TABLET SUBLINGUAL
Status: DISCONTINUED | OUTPATIENT
Start: 2025-01-29 | End: 2025-02-01 | Stop reason: HOSPADM

## 2025-01-29 RX ORDER — PHENYLEPHRINE HCL IN 0.9% NACL 0.5 MG/5ML
SYRINGE (ML) INTRAVENOUS AS NEEDED
Status: DISCONTINUED | OUTPATIENT
Start: 2025-01-29 | End: 2025-01-29 | Stop reason: SURG

## 2025-01-29 RX ORDER — OXYCODONE HYDROCHLORIDE 5 MG/1
5 TABLET ORAL
Status: DISCONTINUED | OUTPATIENT
Start: 2025-01-29 | End: 2025-01-29 | Stop reason: HOSPADM

## 2025-01-29 RX ORDER — ONDANSETRON 2 MG/ML
4 INJECTION INTRAMUSCULAR; INTRAVENOUS ONCE AS NEEDED
Status: DISCONTINUED | OUTPATIENT
Start: 2025-01-29 | End: 2025-01-29 | Stop reason: HOSPADM

## 2025-01-29 RX ORDER — PROMETHAZINE HYDROCHLORIDE 25 MG/1
25 TABLET ORAL ONCE AS NEEDED
Status: DISCONTINUED | OUTPATIENT
Start: 2025-01-29 | End: 2025-01-29 | Stop reason: HOSPADM

## 2025-01-29 RX ORDER — SODIUM CHLORIDE 9 MG/ML
9 INJECTION, SOLUTION INTRAVENOUS ONCE AS NEEDED
Status: DISCONTINUED | OUTPATIENT
Start: 2025-01-29 | End: 2025-01-29

## 2025-01-29 RX ORDER — MORPHINE SULFATE 2 MG/ML
1 INJECTION, SOLUTION INTRAMUSCULAR; INTRAVENOUS ONCE
Status: COMPLETED | OUTPATIENT
Start: 2025-01-29 | End: 2025-01-29

## 2025-01-29 RX ORDER — CEFAZOLIN SODIUM 1 G/3ML
INJECTION, POWDER, FOR SOLUTION INTRAMUSCULAR; INTRAVENOUS AS NEEDED
Status: DISCONTINUED | OUTPATIENT
Start: 2025-01-29 | End: 2025-01-29 | Stop reason: SURG

## 2025-01-29 RX ORDER — PROMETHAZINE HYDROCHLORIDE 25 MG/1
25 SUPPOSITORY RECTAL ONCE AS NEEDED
Status: DISCONTINUED | OUTPATIENT
Start: 2025-01-29 | End: 2025-01-29 | Stop reason: HOSPADM

## 2025-01-29 RX ORDER — ACETAMINOPHEN 325 MG/1
650 TABLET ORAL EVERY 4 HOURS PRN
Status: DISCONTINUED | OUTPATIENT
Start: 2025-01-29 | End: 2025-02-01 | Stop reason: HOSPADM

## 2025-01-29 RX ORDER — LIDOCAINE HYDROCHLORIDE 20 MG/ML
INJECTION, SOLUTION EPIDURAL; INFILTRATION; INTRACAUDAL; PERINEURAL AS NEEDED
Status: DISCONTINUED | OUTPATIENT
Start: 2025-01-29 | End: 2025-01-29 | Stop reason: SURG

## 2025-01-29 RX ADMIN — HYDROMORPHONE HYDROCHLORIDE 0.25 MG: 1 INJECTION, SOLUTION INTRAMUSCULAR; INTRAVENOUS; SUBCUTANEOUS at 09:07

## 2025-01-29 RX ADMIN — Medication 50 MCG: at 08:33

## 2025-01-29 RX ADMIN — HEPARIN SODIUM 3200 UNITS: 1000 INJECTION INTRAVENOUS; SUBCUTANEOUS at 17:50

## 2025-01-29 RX ADMIN — BUDESONIDE 0.5 MG: 0.5 INHALANT ORAL at 19:05

## 2025-01-29 RX ADMIN — HYDROMORPHONE HYDROCHLORIDE 0.5 MG: 1 INJECTION, SOLUTION INTRAMUSCULAR; INTRAVENOUS; SUBCUTANEOUS at 10:19

## 2025-01-29 RX ADMIN — FENTANYL CITRATE 25 MCG: 50 INJECTION, SOLUTION INTRAMUSCULAR; INTRAVENOUS at 09:49

## 2025-01-29 RX ADMIN — MORPHINE SULFATE 1 MG: 2 INJECTION, SOLUTION INTRAMUSCULAR; INTRAVENOUS at 17:53

## 2025-01-29 RX ADMIN — HYDROCODONE BITARTRATE AND ACETAMINOPHEN 1 TABLET: 5; 325 TABLET ORAL at 15:32

## 2025-01-29 RX ADMIN — HYDROCODONE BITARTRATE AND ACETAMINOPHEN 1 TABLET: 5; 325 TABLET ORAL at 21:24

## 2025-01-29 RX ADMIN — Medication 50 MCG: at 08:39

## 2025-01-29 RX ADMIN — ROSUVASTATIN 20 MG: 20 TABLET, FILM COATED ORAL at 21:22

## 2025-01-29 RX ADMIN — MIDAZOLAM HYDROCHLORIDE 0.5 MG: 1 INJECTION, SOLUTION INTRAMUSCULAR; INTRAVENOUS at 09:07

## 2025-01-29 RX ADMIN — Medication 0.5 MG: at 10:19

## 2025-01-29 RX ADMIN — HYDROMORPHONE HYDROCHLORIDE 0.25 MG: 1 INJECTION, SOLUTION INTRAMUSCULAR; INTRAVENOUS; SUBCUTANEOUS at 05:34

## 2025-01-29 RX ADMIN — FLUTICASONE PROPIONATE 1 SPRAY: 50 SPRAY, METERED NASAL at 12:00

## 2025-01-29 RX ADMIN — SEVELAMER CARBONATE 800 MG: 800 TABLET, FILM COATED ORAL at 17:53

## 2025-01-29 RX ADMIN — TOBRAMYCIN 300 MG: 300 SOLUTION RESPIRATORY (INHALATION) at 19:21

## 2025-01-29 RX ADMIN — Medication 50 MCG: at 09:21

## 2025-01-29 RX ADMIN — FENTANYL CITRATE 25 MCG: 50 INJECTION, SOLUTION INTRAMUSCULAR; INTRAVENOUS at 07:49

## 2025-01-29 RX ADMIN — SEVELAMER CARBONATE 800 MG: 800 TABLET, FILM COATED ORAL at 12:00

## 2025-01-29 RX ADMIN — ARFORMOTEROL TARTRATE 15 MCG: 15 SOLUTION RESPIRATORY (INHALATION) at 19:05

## 2025-01-29 RX ADMIN — CEFAZOLIN 2 G: 1 INJECTION, POWDER, FOR SOLUTION INTRAMUSCULAR; INTRAVENOUS; PARENTERAL at 07:41

## 2025-01-29 RX ADMIN — MIDAZOLAM HYDROCHLORIDE 0.5 MG: 1 INJECTION, SOLUTION INTRAMUSCULAR; INTRAVENOUS at 08:20

## 2025-01-29 RX ADMIN — MIDAZOLAM HYDROCHLORIDE 0.5 MG: 1 INJECTION, SOLUTION INTRAMUSCULAR; INTRAVENOUS at 08:48

## 2025-01-29 RX ADMIN — ASPIRIN 81 MG CHEWABLE TABLET 81 MG: 81 TABLET CHEWABLE at 11:13

## 2025-01-29 RX ADMIN — LIDOCAINE HYDROCHLORIDE 80 MG: 20 INJECTION, SOLUTION EPIDURAL; INFILTRATION; INTRACAUDAL; PERINEURAL at 07:45

## 2025-01-29 RX ADMIN — Medication 10 ML: at 12:01

## 2025-01-29 RX ADMIN — FENTANYL CITRATE 25 MCG: 50 INJECTION, SOLUTION INTRAMUSCULAR; INTRAVENOUS at 08:39

## 2025-01-29 RX ADMIN — SODIUM CHLORIDE 9 ML/HR: 9 INJECTION, SOLUTION INTRAVENOUS at 07:18

## 2025-01-29 RX ADMIN — IPRATROPIUM BROMIDE AND ALBUTEROL SULFATE 3 ML: .5; 3 SOLUTION RESPIRATORY (INHALATION) at 19:05

## 2025-01-29 RX ADMIN — FENTANYL CITRATE 25 MCG: 50 INJECTION, SOLUTION INTRAMUSCULAR; INTRAVENOUS at 09:36

## 2025-01-29 RX ADMIN — HYDROMORPHONE HYDROCHLORIDE 0.25 MG: 1 INJECTION, SOLUTION INTRAMUSCULAR; INTRAVENOUS; SUBCUTANEOUS at 09:16

## 2025-01-29 RX ADMIN — MIDAZOLAM HYDROCHLORIDE 0.5 MG: 1 INJECTION, SOLUTION INTRAMUSCULAR; INTRAVENOUS at 07:55

## 2025-01-29 RX ADMIN — Medication 50 MCG: at 09:13

## 2025-01-29 RX ADMIN — CARVEDILOL 25 MG: 25 TABLET, FILM COATED ORAL at 12:00

## 2025-01-29 RX ADMIN — FENTANYL CITRATE 25 MCG: 50 INJECTION, SOLUTION INTRAMUSCULAR; INTRAVENOUS at 08:19

## 2025-01-29 RX ADMIN — ISOSORBIDE MONONITRATE 30 MG: 30 TABLET, EXTENDED RELEASE ORAL at 21:22

## 2025-01-29 RX ADMIN — Medication 10 ML: at 21:24

## 2025-01-29 RX ADMIN — IPRATROPIUM BROMIDE AND ALBUTEROL SULFATE 3 ML: .5; 3 SOLUTION RESPIRATORY (INHALATION) at 16:37

## 2025-01-29 RX ADMIN — IPRATROPIUM BROMIDE AND ALBUTEROL SULFATE 3 ML: .5; 3 SOLUTION RESPIRATORY (INHALATION) at 03:26

## 2025-01-29 RX ADMIN — FENTANYL CITRATE 25 MCG: 50 INJECTION, SOLUTION INTRAMUSCULAR; INTRAVENOUS at 07:44

## 2025-01-29 NOTE — PLAN OF CARE
Goal Outcome Evaluation:  Plan of Care Reviewed With: patient        Progress: no change  Outcome Evaluation: A&O x4, on 3 liters nasal cannula. had angiogram done today, no interventions needed. post vitals obtains, dressing on femoral clean, dry, and intact, pedal pulses present. dialysis completed today at bedside. medicated twice for chronic pain per mar. no concerns or issues noted at this time. continue plan of care.

## 2025-01-29 NOTE — H&P (VIEW-ONLY)
[facility]     Progress Note    Patient Name: Charlette Rai  : 1935  MRN: [id number]  Primary Care Physician:  Brennan Mosqueda MD  Date of admission: 2025    Subjective   Subjective     Patient seen and doing fine with family at bedside.  Discussed the changes in noninvasive studies and indication for angiogram with possible intervention to her previous iliac stents to improve her inflow and ideally resolve her pain.  They are in agreement.    Objective   Objective     Vitals:   Temp:  [98.2 °F (36.8 °C)-99.5 °F (37.5 °C)] 98.2 °F (36.8 °C)  Heart Rate:  [70-84] 83  Resp:  [14-18] 18  BP: (101-141)/(35-82) 130/48  Flow (L/min) (Oxygen Therapy):  [4] 4    Physical Exam   Constitutional: Awake, alert   Neck: Supple   Respiratory: Clear to auscultation bilaterally, nonlabored respirations    Cardiovascular: RRR, no murmurs   Abdomen: benign    Ext: No clubbing cyanosis or edema.  B radial and femoral pulses palp.  Left lower extremity is sensorimotor intact with no evidence of acute limb ischemia present and easily dopplerable bilateral pedal signals.         Assessment & Plan   Assessment / Plan     Assessment/Plan:    Patient is an 89-year-old  female with end-stage renal disease on hemodialysis currently via right IJ tunneled dialysis catheter who also has a history of peripheral vascular disease with known bilateral common iliac artery stenting and arranged bifurcation as well as renal artery angioplasty and stenting who presented to the hospital with new onset left lower extremity weakness and thigh pain but more recently concern for significant swelling.  2.  Patient underwent a left lower extremity venous duplex study which showed no evidence of DVT but a question of a possible superficial femoral artery occlusion.  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  This was noted to be stable comparatively to a CT angiogram performed in 2020.  3.  Patient also has asymptomatic stenoses of the proximal celiac and SMA as well as proximal right renal artery with no evidence of ischemia or changes.  4.  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  5.  Given her pain we will obtain bilateral lower extremity ABIs with toe pressures to compare to previous studies.  Her most recent studies are back on 6/27/2022 and showed a right GILLIAN of 0.65 and left GILLIAN of 0.84 with right great toe pressure of 70 and left great toe pressure 102. Repeat GILLIAN on 1-27-25 shows NC GILLIAN on R and down to 0.62 on L and both toe pressures decreased to 52 B.  Given these findings with stable distal disease and after discussion with patient and family at bedside and L thigh pain I do think that evaluation of her previous B ALICIA and L EIA stents is reasonable with possible intervention.  On CTA there is evidence of moderate stenosis at these levels which could be contributing to her pain.  6.  Most recent echo was performed on 8/24/2024 showing some left ventricular systolic function which is low normal with an EF of 51 to 55% moderately calcified aortic valve and a possible mitral valve stenosis which was unable to be completely assessed.  Risks including but not limited to bleeding, infection, need for multiple procedures, limb loss and death as well as benefits and indications regarding angiogram with possible intervention via left common femoral artery access and possible left femoral pseudoaneurysm repair were discussed with patient and family at bedside who voiced understanding and willingness to proceed.  All questions were answered.  7.  Given her significant change to ABIs and lack of clinical improvement the left thigh  I think it is reasonable to proceed with bilateral iliac artery angiogram with possible intervention via left common femoral artery access.  Given her history of previous pseudoaneurysm which is stable and unchanged I will do this with the OR staff in the hybrid room tomorrow in case open repair of the left common femoral artery is necessary for bleeding at the end of the case should the closure device not obtain hemostasis.  However I do not think that she would be an appropriate candidate for an open operation unless this was for limb salvage or significant unrelenting rest pain which she does not currently have and would likely require cardiac clearance for this prior.  As her hemoglobin is 8.1 we will go ahead and type and cross her for 2 units packed red blood cells to be on hold for the procedure in case she requires open repair of her left common femoral artery.  8.  Past medical history significant for end-stage renal disease once again on hemodialysis Monday Wednesday Friday via tunneled dialysis catheter, peripheral arterial disease with previous stenting as above, hypothyroidism, COPD on 3 L nasal cannula at baseline, history of CHF, previous lung cancer status post right upper lobectomy and chemotherapy.      Active Hospital Problems:  Active Hospital Problems    Diagnosis     **Weakness     Peripheral vascular disease of lower extremity            Electronically signed by Jaden Castañeda MD, 01/28/25, 9:32 PM EST.

## 2025-01-29 NOTE — OP NOTE
Left lower extremity angiogram Procedure Report    Patient Name:  Charlette Rai  YOB: 1935    Date of Surgery:  1/29/2025     Indications: 89-year-old  female with known peripheral arterial disease and bilateral chronic SFA occlusions who presented with left thigh pain and weakness.  On CT scan her occlusions were unchanged in comparison to a previous CT angiogram performed in 2020.  Decision was made to evaluate her left iliac stents in the common and external iliac arteries for any occult lesions that could be a vascular explanation for her thigh pain.    Pre-op Diagnosis:   Peripheral vascular disease of lower extremity [I73.9]       Post-Op Diagnosis Codes:     * Peripheral vascular disease of lower extremity [I73.9]    Procedure(s):  Ultrasound-guided left common femoral artery access  Left iliac and pelvic angiogram  Left lower extremity angiogram with selective views  Supervision interpretation of above    Staff:  Surgeon(s):  Jaden Castañeda MD    Assistant: Ninoska Weaver RN CSA    Anesthesia: General    Estimated Blood Loss: 5 mL    Implants:    Nothing was implanted during the procedure    Specimen: None       Findings: Patient has widely patent left common and external iliac stents with no evidence of significant stenosis or occlusion.  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  Access was obtained above the pseudoaneurysm to avoid any complication.  Once again she has chronic left SFA occlusion with a prominent left profunda and collaterals reconstituting the distal left SFA at the adductor canal.  She has single-vessel runoff via the peroneal which does give of collateral flow to the patient's dorsalis pedis and with some partial pedal arch filling.    Complications: None    Description of Procedure: Patient was brought back to the operating room and placed on the operating table in supine position.  Upon induction  of monitored anesthesia after physician the timeout and bilateral groins have been prepped and draped in normal sterile fashion from umbilicus down to the knees ultrasound-guided access was obtained using a micropuncture kit to the left common femoral artery once again above the patient's previously known chronic pseudoaneurysm.  This did require some work as the access was moderately difficult to obtain.  Ultimately I was able to pass a wire through the left external iliac and common iliac stents and a micropuncture access was placed.  Selective views of the external iliac artery stents, common iliac artery and stents in the left iliac system were obtained with results as above.   Subsequently left lower extremity runoff was obtained once again with results of as above including the known left SFA occlusion with reconstitution via a prominent profunda and single-vessel runoff via left peroneal artery with collateral flow to the dorsalis pedis.  Her anterior tibial and posterior tibial arteries are occluded proximally.  At this point I was satisfied with the procedure and did not find a vascular source of her leg pain as such the micropuncture catheter was removed and direct pressure was held for hemostasis for a period of approximately 10 minutes.  Hemostasis was achieved and sterile dressing was applied.  Please note the patient tarted procedure well with no complications or difficulties.    Assistant: Ninoska Weaver RN CSA  was responsible for performing the following activities:  Procedural assistance  and their skilled assistance was necessary for the success of this case.    Jaden Castañeda MD     Date: 1/29/2025  Time: 10:51 EST

## 2025-01-29 NOTE — SIGNIFICANT NOTE
01/29/25 0800   Physical Therapy Time and Intention   Session Not Performed other (see comments)  (Hold, possible vascular intervention)

## 2025-01-29 NOTE — PLAN OF CARE
Goal Outcome Evaluation:  Plan of Care Reviewed With: patient        Progress: no change  Outcome Evaluation: Pt remains A&Ox4, on 4L NC. X1 assist to BSC. Pt to go for angiogram today. Blood consent signed and sent with pt to OR for possible intervention. Medicated once per order for c/o pain. No needs at this time.

## 2025-01-29 NOTE — NURSING NOTE
Duration of Treatment 4.0 Hours   Access Site AVF   Dialyzer Revaclear    mL/min   Dialysate Temperature (C) 36   BFR-As tolerated to a maximum of: 400 mL/min   Dialysate Solution Bath: K+ = 2 mEq, Ca = 2.5mEq   Bicarb 35 mEq   Na+ 137 meq   Fluid Removal: 2L     Patient tolerated treatment well. Ran 3 hrs per patient request, Dr García aware and was ok with this. Removed 1500mls UF with BVP of 72.  Reported off to Eladia KHAN

## 2025-01-29 NOTE — PROGRESS NOTES
" LOS: 0 days   Patient Care Team:  Brennan Mosqueda MD as PCP - General (Internal Medicine)  Regis Mckeon MD as Consulting Physician (Radiation Oncology)  Carlton Casillas MD as Consulting Physician (Gastroenterology)  Susan Marcos APRN as Nurse Practitioner (Gastroenterology)    Chief Complaint: ESRD    Subjective     Pt without any acute complaints, tired, status post angiogram earlier today.  No intervention.  Seen while receiving dialysis.    History taken from: patient chart RN    Objective     Vital Sign Min/Max for last 24 hours  Temp  Min: 97.2 °F (36.2 °C)  Max: 98.2 °F (36.8 °C)   BP  Min: 94/74  Max: 169/55   Pulse  Min: 69  Max: 88   Resp  Min: 15  Max: 18   SpO2  Min: 89 %  Max: 100 %   Flow (L/min) (Oxygen Therapy)  Min: 3  Max: 4   No data recorded     Flowsheet Rows      Flowsheet Row First Filed Value   Admission Height 162.6 cm (64\") Documented at 01/26/2025 2147   Admission Weight 66.6 kg (146 lb 13.2 oz) Documented at 01/26/2025 2147            I/O this shift:  In: 1365 [P.O.:265; I.V.:1100]  Out: 5 [Blood:5]  I/O last 3 completed shifts:  In: 960 [P.O.:960]  Out: -     Objective:  General Appearance:  Comfortable, in no acute distress and not in pain.    Vital signs: (most recent): Blood pressure 159/59, pulse 73, temperature 98.1 °F (36.7 °C), temperature source Oral, resp. rate 18, height 165.1 cm (65\"), weight 64.5 kg (142 lb 3.2 oz), SpO2 96%, not currently breastfeeding.  Vital signs are normal.  No fever.    HEENT: Normal HEENT exam.    Lungs:  Normal effort and normal respiratory rate.    Heart: Normal rate.  Regular rhythm.    Abdomen: Abdomen is soft.  Bowel sounds are normal.   There is no abdominal tenderness.     Extremities: Normal range of motion.  There is no dependent edema.    Pulses: Distal pulses are intact.    Neurological: Patient is alert and oriented to person, place and time.    Pupils:  Pupils are equal, round, and reactive to light.    Skin:  " "Warm and dry.            Unchanged    Results Review:     I reviewed the patient's new clinical results.  I reviewed the patient's new imaging results and agree with the interpretation.  I reviewed the patient's other test results and agree with the interpretation    WBC WBC   Date Value Ref Range Status   01/29/2025 4.09 3.40 - 10.80 10*3/mm3 Final   01/28/2025 4.64 3.40 - 10.80 10*3/mm3 Final   01/27/2025 5.44 3.40 - 10.80 10*3/mm3 Final   01/26/2025 3.90 3.40 - 10.80 10*3/mm3 Final      HGB Hemoglobin   Date Value Ref Range Status   01/29/2025 7.3 (L) 12.0 - 15.9 g/dL Final   01/28/2025 8.1 (L) 12.0 - 15.9 g/dL Final   01/27/2025 8.5 (L) 12.0 - 15.9 g/dL Final   01/26/2025 7.5 (L) 12.0 - 15.9 g/dL Final      HCT Hematocrit   Date Value Ref Range Status   01/29/2025 26.3 (L) 34.0 - 46.6 % Final   01/28/2025 25.9 (L) 34.0 - 46.6 % Final   01/27/2025 30.2 (L) 34.0 - 46.6 % Final   01/26/2025 26.5 (L) 34.0 - 46.6 % Final      Platlets No results found for: \"LABPLAT\"   MCV MCV   Date Value Ref Range Status   01/29/2025 112.4 (H) 79.0 - 97.0 fL Final   01/28/2025 108.4 (H) 79.0 - 97.0 fL Final   01/27/2025 109.4 (H) 79.0 - 97.0 fL Final   01/26/2025 109.5 (H) 79.0 - 97.0 fL Final          Sodium Sodium   Date Value Ref Range Status   01/29/2025 135 (L) 136 - 145 mmol/L Final   01/28/2025 139 136 - 145 mmol/L Final   01/26/2025 145 136 - 145 mmol/L Final      Potassium Potassium   Date Value Ref Range Status   01/29/2025 4.3 3.5 - 5.2 mmol/L Final     Comment:     Slight hemolysis detected by analyzer. Result may be falsely elevated.   01/28/2025 4.2 3.5 - 5.2 mmol/L Final   01/26/2025 5.2 3.5 - 5.2 mmol/L Final     Comment:     Slight hemolysis detected by analyzer. Result may be falsely elevated.      Chloride Chloride   Date Value Ref Range Status   01/29/2025 104 98 - 107 mmol/L Final   01/28/2025 106 98 - 107 mmol/L Final   01/26/2025 105 98 - 107 mmol/L Final      CO2 CO2   Date Value Ref Range Status   01/29/2025 " "16.2 (L) 22.0 - 29.0 mmol/L Final   01/28/2025 24.1 22.0 - 29.0 mmol/L Final   01/26/2025 28.8 22.0 - 29.0 mmol/L Final      BUN BUN   Date Value Ref Range Status   01/29/2025 32 (H) 8 - 23 mg/dL Final   01/28/2025 18 8 - 23 mg/dL Final   01/26/2025 34 (H) 8 - 23 mg/dL Final      Creatinine Creatinine   Date Value Ref Range Status   01/29/2025 4.62 (H) 0.57 - 1.00 mg/dL Final   01/28/2025 3.47 (H) 0.57 - 1.00 mg/dL Final   01/26/2025 4.70 (H) 0.57 - 1.00 mg/dL Final      Calcium Calcium   Date Value Ref Range Status   01/29/2025 7.7 (L) 8.6 - 10.5 mg/dL Final   01/28/2025 8.0 (L) 8.6 - 10.5 mg/dL Final   01/26/2025 7.8 (L) 8.6 - 10.5 mg/dL Final      PO4 No results found for: \"CAPO4\"   Albumin Albumin   Date Value Ref Range Status   01/29/2025 2.4 (L) 3.5 - 5.2 g/dL Final   01/28/2025 3.0 (L) 3.5 - 5.2 g/dL Final   01/26/2025 3.3 (L) 3.5 - 5.2 g/dL Final      Magnesium Magnesium   Date Value Ref Range Status   01/29/2025 2.1 1.6 - 2.4 mg/dL Final   01/28/2025 2.0 1.6 - 2.4 mg/dL Final      Uric Acid No results found for: \"URICACID\"     Medication Review:   arformoterol, 15 mcg, Nebulization, BID - RT  aspirin, 81 mg, Oral, Daily  budesonide, 0.5 mg, Nebulization, BID - RT  carvedilol, 25 mg, Oral, BID With Meals  fluticasone, 1 spray, Nasal, Daily  ipratropium-albuterol, 3 mL, Nebulization, Q4H - RT  isosorbide mononitrate, 30 mg, Oral, Nightly  levothyroxine, 50 mcg, Oral, Q AM  Morphine, 1 mg, Intravenous, Once  rosuvastatin, 20 mg, Oral, Nightly  sevelamer, 800 mg, Oral, TID With Meals  sodium chloride, 10 mL, Intravenous, Q12H  tobramycin PF, 300 mg, Nebulization, BID - RT          Assessment & Plan       Weakness    Peripheral vascular disease of lower extremity      Assessment & Plan  - ESRD, dialysis 3 times a week at [facility] through right IJ TDC.  Continue same m/w/f.  Patient wants to reduce the time of her dialysis.  Will decrease to 3 hours, dialysis bath adjusted.  Discussed with dialysis " staff.    - Hypertension, improved after dialysis, resume blood pressure medications per home dosing and monitor.    - Anemia, severe, iron studies were equivocal low TSAT but high ferritin possibly inflammatory state.  Transfuse if needed, on long-acting CASSANDRA as outpatient.    - Severe peripheral arterial disease as noted on CT angiogram earlier today.  Status post traditional angiogram per vascular, no intervention reported.      - Possible lung scarring versus consolidation/mass, pulmonary consulted.  CT-guided biopsy planned for Friday.    - Hypothyroidism, per primary.    - Type 2 diabetes with complications, per primary.    - Diastolic dysfunction/LVH and aortic stenosis.    - Renal mass, followed by urology as outpatient.    Discussed with dialysis staff.    Edi García MD  01/29/25  16:40 EST

## 2025-01-29 NOTE — PROGRESS NOTES
[facility]     Progress Note    Patient Name: Charlette Rai  : 1935  MRN: [id number]  Primary Care Physician:  Brennan Mosqueda MD  Date of admission: 2025    Subjective   Subjective     Chief Complaint: Follow-up for pulmonary nodule    History of Present Illness  Patient Reports interested in biopsy  On 3 L    Review of Systems    Objective   Objective     Vitals:   Temp:  [97.2 °F (36.2 °C)-98.2 °F (36.8 °C)] 97.3 °F (36.3 °C)  Heart Rate:  [69-88] 73  Resp:  [15-18] 16  BP: ()/(33-92) 134/56  Flow (L/min) (Oxygen Therapy):  [3-4] 3    Physical Exam   Resting comfortably  Alert and orient  In no acute distress  Result Review    Result Review:  I have personally reviewed the results from the time of this admission to 2025 18:05 EST and agree with these findings:  [x]  Laboratory list / accordion  []  Microbiology  []  Radiology  []  EKG/Telemetry   []  Cardiology/Vascular   []  Pathology  []  Old records  []  Other:  Most notable findings include: CT scan of the chest showing concerning right lower lobe pulmonary nodule      Assessment & Plan   Assessment / Plan     Brief Patient Summary:  Charlette Rai is a 89 y.o. female who admitted to the hospital found to have abnormal CT scan of the chest    Active Hospital Problems:  Active Hospital Problems    Diagnosis     **Weakness     Peripheral vascular disease of lower extremity    Right lower lobe pulmonary nodule    Plan:   Reached out and spoke with IR  IR is agreed to do the biopsy  The biopsy will be this Friday after the patient is been off Plavix for for 5 days  At the third the biopsy if the patient does well with no complications I would be okay with her going home  The patient does need to follow-up with us in the office to follow-up on the results of this biopsy as I strongly suspect that this would likely represent malignancy      VTE Prophylaxis:  Pharmacologic VTE prophylaxis orders are present.        CODE  STATUS:    Code Status (Patient has no pulse and is not breathing): CPR (Attempt to Resuscitate)  Medical Interventions (Patient has pulse or is breathing): Full Support      Regis Holcomb DO    Electronically signed by Regis Holcomb DO, 01/29/25, 6:05 PM EST.

## 2025-01-29 NOTE — ANESTHESIA POSTPROCEDURE EVALUATION
Patient: Charlette Rai    Procedure Summary       Date: 01/29/25 Room / Location: [facility] OR  / [facility] HYBRID OR    Anesthesia Start: 0727 Anesthesia Stop: 1014    Procedure: LEFT LOWER EXTREMITY ANGIOGRAM (Left) Diagnosis:       Peripheral vascular disease of lower extremity      (Peripheral vascular disease of lower extremity [I73.9])    Surgeons: Jaden Castañeda MD Provider: Agustin Swanson MD    Anesthesia Type: general, MAC, Na ASA Status: 4            Anesthesia Type: general, MAC, [place]    Vitals  Vitals Value Taken Time   /43 01/29/25 1117   Temp 36.7 °C (98 °F) 01/29/25 1100   Pulse 83 01/29/25 1117   Resp 17 01/29/25 1100   SpO2 96 % 01/29/25 1117   Vitals shown include unfiled device data.        Post Anesthesia Care and Evaluation    Patient location during evaluation: bedside  Patient participation: complete - patient participated  Level of consciousness: awake  Pain management: adequate    Airway patency: patent  PONV Status: none  Cardiovascular status: acceptable and stable  Respiratory status: acceptable  Hydration status: acceptable

## 2025-01-29 NOTE — INTERVAL H&P NOTE
Vascular surgery H&P/consult and progress notes reviewed.  Patient was examined and there are no changes to the vascular surgery team assessment and plan.      Proceed with angiogram with possible iliac intervention.

## 2025-01-29 NOTE — PROGRESS NOTES
[facility]   Hospitalist Progress Note  Date: 2025  Patient Name: Charlette Rai  : 1935  MRN: [id number]  Date of admission: 2025  Room/Bed: [facility]/1      Subjective   Subjective     Chief Complaint: Weakness    Summary:Charlette Rai is a 89 y.o. female past medical history of ESRD on HD , peripheral arterial disease status post lower extremity stenting, renal artery stenosis status post tenting, hypothyroidism, COPD on 3 L nasal cannula, heart failure preserved ejection fraction, prior lung cancer status post chemotherapy and right upper lobectomy who presents to the ER due to new onset left lower extremity swelling with pain and weakness.  Patient states that roughly 4 days ago she started noticed increasing swelling below her left knee and has progressively worsened in the last 3 days to the point that it is up to her hip.  In that time she was also had progressive weakness and inability bear weight on her left lower extremity due to pain that ultimately prompted her family to advise her to come into the ER tonight for evaluation     On arrival patient was hemodynamically stable and on her home level of oxygen.  Lab workup was revealing of a mildly elevated INR of 1.2, chronically elevated creatinine of 4.7 and chronic anemia with hemoglobin of 7.5 as well as platelets of 116.  Hip imaging was done and did not reveal any fractures and a an ultrasound of the left lower extremity is pending at this time.  Attempt was made to ambulate the patient in the ER but this was unsuccessful as she has difficulty bearing weight on this and as a result hospitalist was then contacted for admission for deconditioning and debility related to a possible DVT in the left lower extremity.  Signed my exam patient is resting comfortably and is able to lift her leg in bed but states that she cannot bear any weight on it.  Denies having any trauma recently.  No recent surgery to the left  lower extremity.  Denies any history of DVTs.  She has not missed any dialysis.    Interval Followup: No acute overnight events.  No acute distress.  She went down for her vascular procedure earlier today.  Is receiving bedside dialysis at the time my exam.  She reports that her respiratory status is better.  That she is having some pain.  Otherwise, no new complaints.    Objective   Objective     Vitals:   Temp:  [97.2 °F (36.2 °C)-98.2 °F (36.8 °C)] 98.1 °F (36.7 °C)  Heart Rate:  [69-88] 76  Resp:  [15-18] 16  BP: ()/(33-92) 114/69  Flow (L/min) (Oxygen Therapy):  [3-4] 3    Physical Exam   Gen: NAD, Alert and Oriented  Pulm: Nasal cannula, no respiratory distress  Abd: soft, nondistended  Extremities: no pitting edema    Result Review    Result Review:  I have personally reviewed these results:  [x]  Laboratory      Lab 01/29/25  0546 01/28/25  1315 01/28/25  0511 01/27/25  2203 01/27/25  1416 01/26/25 2204   WBC 4.09  --  4.64  --  5.44 3.90   HEMOGLOBIN 7.3*  --  8.1*  --  8.5* 7.5*   HEMATOCRIT 26.3*  --  25.9*  --  30.2* 26.5*   PLATELETS 101*  --  138*  --  127* 116*   NEUTROS ABS 3.15  --  2.90  --  4.14 2.35   IMMATURE GRANS (ABS)  --   --  0.01  --  0.02 0.01   LYMPHS ABS  --   --  0.75  --  0.49* 0.65*   MONOS ABS  --   --  0.82  --  0.63 0.72   EOS ABS 0.08  --  0.14  --  0.14 0.15   .4*  --  108.4*  --  109.4* 109.5*   PROTIME  --  15.3*  --   --  14.1 15.4*   APTT  --  46.9* 151.7* 47.2* 29.9* 32.0         Lab 01/29/25  0546 01/28/25  0511 01/26/25 2204   SODIUM 135* 139 145   POTASSIUM 4.3 4.2 5.2   CHLORIDE 104 106 105   CO2 16.2* 24.1 28.8   ANION GAP 14.8 8.9 11.2   BUN 32* 18 34*   CREATININE 4.62* 3.47* 4.70*   EGFR 8.7* 12.3* 8.5*   GLUCOSE 80 102* 188*   CALCIUM 7.7* 8.0* 7.8*   MAGNESIUM 2.1 2.0  --    PHOSPHORUS 5.3* 4.8*  --          Lab 01/29/25  0546 01/28/25  0511 01/26/25 2204   TOTAL PROTEIN  --   --  5.8*   ALBUMIN 2.4* 3.0* 3.3*   GLOBULIN  --   --  2.5   ALT (SGPT)   --   --  14   AST (SGOT)  --   --  25   BILIRUBIN  --   --  0.2   ALK PHOS  --   --  292*         Lab 01/28/25  1315 01/27/25  1416 01/26/25  2204   PROTIME 15.3* 14.1 15.4*   INR 1.18* 1.07 1.20*             Lab 01/28/25  0511 01/27/25  1416 01/26/25  2204   IRON  --   --  41   IRON SATURATION (TSAT)  --   --  17*   TIBC  --   --  235*   TRANSFERRIN  --   --  158*   FERRITIN  --   --  1,048.00*   ABO TYPING O   < >  --    RH TYPING Positive   < >  --    ANTIBODY SCREEN Negative  --   --     < > = values in this interval not displayed.         Brief Urine Lab Results  (Last result in the past 365 days)        Color   Clarity   Blood   Leuk Est   Nitrite   Protein   CREAT   Urine HCG        05/01/24 2102 Dark Yellow   Cloudy   Negative   Small (1+)   Negative   >=300 mg/dL (3+)                 [x]  Microbiology   Microbiology Results (last 10 days)       ** No results found for the last 240 hours. **          [x]  Radiology  CT Chest Without Contrast Diagnostic    Result Date: 1/28/2025     1. There is a new irregular 3.3 cm subpleural opacity in the right lower lobe. It may be partially cavitary. A malignant process is possible. Consider biopsy for further evaluation. Nuclear Medicine (NM) whole-body FDG-PET-CT scan may be helpful in further evaluation, as well.  2. Please see above comments for further detail.    Portions of this note were completed with a voice recognition program.  Electronically Signed By-Chicho Jones MD On:1/28/2025 4:46 AM      CT Angio Abdominal Aorta Bilateral Iliofem Runoff    Result Date: 1/27/2025  Impression: 1. Chronic bilateral SFA occlusions stable since 2020. Bilateral distal at the level of the popliteal arteries through chronic developed collaterals, with three-vessel runoff to the level of the ankle bilaterally. 2. Severe intra-abdominal atherosclerotic disease including high-grade stenosis of proximal celiac artery, proximal SMA and proximal right renal artery. Mild ectatic  dilation of the infrarenal abdominal aorta. All vascular stents in the abdomen are patent. No evidence of solid organ or hollow viscus ischemia. 3. Stable left common femoral artery pseudoaneurysm since 5/31/2024. 4. Masslike right lower lobe consolidation which could reflect a focal pneumonia versus potential neoplasm in patient with prior history of lung malignancy. Consider pulmonary consultation for further management. 5. Findings suggesting volume overload/third spacing including small right pleural effusion, interstitial pulmonary edema and right lower lobe groundglass suspicious for early alveolar edema versus nonspecific infectious/inflammatory process. Body wall and bilateral lower extremity edema, left greater than right. 6. Cholelithiasis. 7. Numerous additional chronic/ancillary findings as above. Electronically Signed: Ildefonso Purcell MD  1/27/2025 1:11 PM EST  Workstation ID: [id number]    XR Hip With or Without Pelvis 2 - 3 View Left    Result Date: 1/27/2025  No acute fracture or acute malalignment is identified.   Portions of this note were completed with a voice recognition program.   Electronically Signed By-Chicho Jones MD On:1/27/2025 5:15 AM     []  EKG/Telemetry   []  Cardiology/Vascular   []  Pathology  []  Old records  []  Other:    Assessment & Plan   Assessment / Plan     Assessment:  Left lower extremity pain  Left lower extremity weakness secondary to pain  Chronic hypoxic respiratory failure, 3 L baseline  HFpEF, not in acute exacerbation  COPD, not in acute exacerbation  ESRD on HD MWF  Chronic microcytic anemia  Hypertension  History of JONAH s/p stenting  PAD s/p lower extremity stenting  Hypothyroidism  Recurrent Pseudomonas pneumonia, history JUAN nebs  GERD  History of lung cancer with new pulmonary nodule    Plan:  Continue inpatient admission  Pulmonology consulted.  CT chest with new irregular 3.3 cm subpleural right lower lobe lesion.  IR guided biopsy ordered for  Friday.  Nephrology following.  Continue dialysis MWF.  Right IJ TDC.  Vascular following.  Patient has multiple chronic occlusions.  Planning for angiogram today.    Continue Brovana, Pulmicort  Continue JUAN nebs, looks like it was filled in December, no recent pulm note outpatient, will confirm with pulmonology team if she still needs this  Home meds reviewed.  Continue aspirin, hold Plavix for possible biopsy.  Hemoglobin stable  Platelets improved  PT/OT  A.m. labs       Discussed with RN.    VTE Prophylaxis:  Pharmacologic VTE prophylaxis orders are present.        CODE STATUS:   Code Status (Patient has no pulse and is not breathing): CPR (Attempt to Resuscitate)  Medical Interventions (Patient has pulse or is breathing): Full Support      Electronically signed by Dariana Valentin DO, 1/29/2025, 16:15 EST.

## 2025-01-29 NOTE — PROGRESS NOTES
[facility]     Progress Note    Patient Name: Charlette Rai  : 1935  MRN: [id number]  Primary Care Physician:  Brennan Mosqueda MD  Date of admission: 2025    Subjective   Subjective     Patient seen and doing fine with family at bedside.  Discussed the changes in noninvasive studies and indication for angiogram with possible intervention to her previous iliac stents to improve her inflow and ideally resolve her pain.  They are in agreement.    Objective   Objective     Vitals:   Temp:  [98.2 °F (36.8 °C)-99.5 °F (37.5 °C)] 98.2 °F (36.8 °C)  Heart Rate:  [70-84] 83  Resp:  [14-18] 18  BP: (101-141)/(35-82) 130/48  Flow (L/min) (Oxygen Therapy):  [4] 4    Physical Exam   Constitutional: Awake, alert   Neck: Supple   Respiratory: Clear to auscultation bilaterally, nonlabored respirations    Cardiovascular: RRR, no murmurs   Abdomen: benign    Ext: No clubbing cyanosis or edema.  B radial and femoral pulses palp.  Left lower extremity is sensorimotor intact with no evidence of acute limb ischemia present and easily dopplerable bilateral pedal signals.         Assessment & Plan   Assessment / Plan     Assessment/Plan:    Patient is an 89-year-old  female with end-stage renal disease on hemodialysis currently via right IJ tunneled dialysis catheter who also has a history of peripheral vascular disease with known bilateral common iliac artery stenting and arranged bifurcation as well as renal artery angioplasty and stenting who presented to the hospital with new onset left lower extremity weakness and thigh pain but more recently concern for significant swelling.  2.  Patient underwent a left lower extremity venous duplex study which showed no evidence of DVT but a question of a possible superficial femoral artery occlusion.  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  This was noted to be stable comparatively to a CT angiogram performed in 2020.  3.  Patient also has asymptomatic stenoses of the proximal celiac and SMA as well as proximal right renal artery with no evidence of ischemia or changes.  4.  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  5.  Given her pain we will obtain bilateral lower extremity ABIs with toe pressures to compare to previous studies.  Her most recent studies are back on 6/27/2022 and showed a right GILLIAN of 0.65 and left GILLIAN of 0.84 with right great toe pressure of 70 and left great toe pressure 102. Repeat GILLIAN on 1-27-25 shows NC GILLIAN on R and down to 0.62 on L and both toe pressures decreased to 52 B.  Given these findings with stable distal disease and after discussion with patient and family at bedside and L thigh pain I do think that evaluation of her previous B ALICIA and L EIA stents is reasonable with possible intervention.  On CTA there is evidence of moderate stenosis at these levels which could be contributing to her pain.  6.  Most recent echo was performed on 8/24/2024 showing some left ventricular systolic function which is low normal with an EF of 51 to 55% moderately calcified aortic valve and a possible mitral valve stenosis which was unable to be completely assessed.  Risks including but not limited to bleeding, infection, need for multiple procedures, limb loss and death as well as benefits and indications regarding angiogram with possible intervention via left common femoral artery access and possible left femoral pseudoaneurysm repair were discussed with patient and family at bedside who voiced understanding and willingness to proceed.  All questions were answered.  7.  Given her significant change to ABIs and lack of clinical improvement the left thigh  I think it is reasonable to proceed with bilateral iliac artery angiogram with possible intervention via left common femoral artery access.  Given her history of previous pseudoaneurysm which is stable and unchanged I will do this with the OR staff in the hybrid room tomorrow in case open repair of the left common femoral artery is necessary for bleeding at the end of the case should the closure device not obtain hemostasis.  However I do not think that she would be an appropriate candidate for an open operation unless this was for limb salvage or significant unrelenting rest pain which she does not currently have and would likely require cardiac clearance for this prior.  As her hemoglobin is 8.1 we will go ahead and type and cross her for 2 units packed red blood cells to be on hold for the procedure in case she requires open repair of her left common femoral artery.  8.  Past medical history significant for end-stage renal disease once again on hemodialysis Monday Wednesday Friday via tunneled dialysis catheter, peripheral arterial disease with previous stenting as above, hypothyroidism, COPD on 3 L nasal cannula at baseline, history of CHF, previous lung cancer status post right upper lobectomy and chemotherapy.      Active Hospital Problems:  Active Hospital Problems    Diagnosis     **Weakness     Peripheral vascular disease of lower extremity            Electronically signed by Jaden Castañeda MD, 01/28/25, 9:32 PM EST.

## 2025-01-30 LAB
ALBUMIN SERPL-MCNC: 2.7 G/DL (ref 3.5–5.2)
ANION GAP SERPL CALCULATED.3IONS-SCNC: 7.2 MMOL/L (ref 5–15)
APTT PPP: 35.5 SECONDS (ref 24.2–34.2)
BASOPHILS # BLD AUTO: 0.01 10*3/MM3 (ref 0–0.2)
BASOPHILS NFR BLD AUTO: 0.2 % (ref 0–1.5)
BUN SERPL-MCNC: 19 MG/DL (ref 8–23)
BUN/CREAT SERPL: 6 (ref 7–25)
CALCIUM SPEC-SCNC: 7.6 MG/DL (ref 8.6–10.5)
CHLORIDE SERPL-SCNC: 103 MMOL/L (ref 98–107)
CO2 SERPL-SCNC: 25.8 MMOL/L (ref 22–29)
CREAT SERPL-MCNC: 3.18 MG/DL (ref 0.57–1)
DEPRECATED RDW RBC AUTO: 58.9 FL (ref 37–54)
EGFRCR SERPLBLD CKD-EPI 2021: 13.6 ML/MIN/1.73
EOSINOPHIL # BLD AUTO: 0.15 10*3/MM3 (ref 0–0.4)
EOSINOPHIL NFR BLD AUTO: 3.5 % (ref 0.3–6.2)
ERYTHROCYTE [DISTWIDTH] IN BLOOD BY AUTOMATED COUNT: 15.4 % (ref 12.3–15.4)
GLUCOSE SERPL-MCNC: 97 MG/DL (ref 65–99)
HCT VFR BLD AUTO: 24.4 % (ref 34–46.6)
HGB BLD-MCNC: 7.1 G/DL (ref 12–15.9)
IMM GRANULOCYTES # BLD AUTO: 0.01 10*3/MM3 (ref 0–0.05)
IMM GRANULOCYTES NFR BLD AUTO: 0.2 % (ref 0–0.5)
LYMPHOCYTES # BLD AUTO: 0.61 10*3/MM3 (ref 0.7–3.1)
LYMPHOCYTES NFR BLD AUTO: 14.3 % (ref 19.6–45.3)
MAGNESIUM SERPL-MCNC: 2 MG/DL (ref 1.6–2.4)
MCH RBC QN AUTO: 31.3 PG (ref 26.6–33)
MCHC RBC AUTO-ENTMCNC: 29.1 G/DL (ref 31.5–35.7)
MCV RBC AUTO: 107.5 FL (ref 79–97)
MONOCYTES # BLD AUTO: 0.8 10*3/MM3 (ref 0.1–0.9)
MONOCYTES NFR BLD AUTO: 18.7 % (ref 5–12)
NEUTROPHILS NFR BLD AUTO: 2.7 10*3/MM3 (ref 1.7–7)
NEUTROPHILS NFR BLD AUTO: 63.1 % (ref 42.7–76)
NRBC BLD AUTO-RTO: 0 /100 WBC (ref 0–0.2)
PHOSPHATE SERPL-MCNC: 4.2 MG/DL (ref 2.5–4.5)
PLATELET # BLD AUTO: 98 10*3/MM3 (ref 140–450)
PMV BLD AUTO: 10.2 FL (ref 6–12)
POTASSIUM SERPL-SCNC: 4 MMOL/L (ref 3.5–5.2)
RBC # BLD AUTO: 2.27 10*6/MM3 (ref 3.77–5.28)
SODIUM SERPL-SCNC: 136 MMOL/L (ref 136–145)
WBC NRBC COR # BLD AUTO: 4.28 10*3/MM3 (ref 3.4–10.8)

## 2025-01-30 PROCEDURE — 85730 THROMBOPLASTIN TIME PARTIAL: CPT | Performed by: SURGERY

## 2025-01-30 PROCEDURE — 83735 ASSAY OF MAGNESIUM: CPT | Performed by: SURGERY

## 2025-01-30 PROCEDURE — 94799 UNLISTED PULMONARY SVC/PX: CPT

## 2025-01-30 PROCEDURE — 85025 COMPLETE CBC W/AUTO DIFF WBC: CPT | Performed by: SURGERY

## 2025-01-30 PROCEDURE — 80069 RENAL FUNCTION PANEL: CPT | Performed by: SURGERY

## 2025-01-30 PROCEDURE — 99232 SBSQ HOSP IP/OBS MODERATE 35: CPT | Performed by: STUDENT IN AN ORGANIZED HEALTH CARE EDUCATION/TRAINING PROGRAM

## 2025-01-30 PROCEDURE — 94664 DEMO&/EVAL PT USE INHALER: CPT

## 2025-01-30 PROCEDURE — 94761 N-INVAS EAR/PLS OXIMETRY MLT: CPT

## 2025-01-30 PROCEDURE — 97110 THERAPEUTIC EXERCISES: CPT

## 2025-01-30 RX ADMIN — SEVELAMER CARBONATE 800 MG: 800 TABLET, FILM COATED ORAL at 08:35

## 2025-01-30 RX ADMIN — CARVEDILOL 25 MG: 25 TABLET, FILM COATED ORAL at 08:36

## 2025-01-30 RX ADMIN — SEVELAMER CARBONATE 800 MG: 800 TABLET, FILM COATED ORAL at 12:27

## 2025-01-30 RX ADMIN — Medication 10 ML: at 08:36

## 2025-01-30 RX ADMIN — ROSUVASTATIN 20 MG: 20 TABLET, FILM COATED ORAL at 21:30

## 2025-01-30 RX ADMIN — HYDROCODONE BITARTRATE AND ACETAMINOPHEN 1 TABLET: 5; 325 TABLET ORAL at 05:35

## 2025-01-30 RX ADMIN — BUDESONIDE 0.5 MG: 0.5 INHALANT ORAL at 19:12

## 2025-01-30 RX ADMIN — Medication 10 ML: at 08:37

## 2025-01-30 RX ADMIN — HYDROCODONE BITARTRATE AND ACETAMINOPHEN 1 TABLET: 5; 325 TABLET ORAL at 10:08

## 2025-01-30 RX ADMIN — CARVEDILOL 25 MG: 25 TABLET, FILM COATED ORAL at 17:46

## 2025-01-30 RX ADMIN — ISOSORBIDE MONONITRATE 30 MG: 30 TABLET, EXTENDED RELEASE ORAL at 21:30

## 2025-01-30 RX ADMIN — IPRATROPIUM BROMIDE AND ALBUTEROL SULFATE 3 ML: .5; 3 SOLUTION RESPIRATORY (INHALATION) at 06:20

## 2025-01-30 RX ADMIN — FLUTICASONE PROPIONATE 1 SPRAY: 50 SPRAY, METERED NASAL at 08:37

## 2025-01-30 RX ADMIN — IPRATROPIUM BROMIDE AND ALBUTEROL SULFATE 3 ML: .5; 3 SOLUTION RESPIRATORY (INHALATION) at 19:12

## 2025-01-30 RX ADMIN — Medication 10 ML: at 21:30

## 2025-01-30 RX ADMIN — ASPIRIN 81 MG CHEWABLE TABLET 81 MG: 81 TABLET CHEWABLE at 08:35

## 2025-01-30 RX ADMIN — BUDESONIDE 0.5 MG: 0.5 INHALANT ORAL at 06:20

## 2025-01-30 RX ADMIN — LEVOTHYROXINE SODIUM 50 MCG: 50 TABLET ORAL at 05:35

## 2025-01-30 RX ADMIN — TOBRAMYCIN 300 MG: 300 SOLUTION RESPIRATORY (INHALATION) at 19:17

## 2025-01-30 RX ADMIN — IPRATROPIUM BROMIDE AND ALBUTEROL SULFATE 3 ML: .5; 3 SOLUTION RESPIRATORY (INHALATION) at 11:35

## 2025-01-30 RX ADMIN — HYDROCODONE BITARTRATE AND ACETAMINOPHEN 1 TABLET: 5; 325 TABLET ORAL at 14:51

## 2025-01-30 RX ADMIN — TOBRAMYCIN 300 MG: 300 SOLUTION RESPIRATORY (INHALATION) at 06:20

## 2025-01-30 RX ADMIN — HYDROCODONE BITARTRATE AND ACETAMINOPHEN 1 TABLET: 5; 325 TABLET ORAL at 21:30

## 2025-01-30 RX ADMIN — SEVELAMER CARBONATE 800 MG: 800 TABLET, FILM COATED ORAL at 17:46

## 2025-01-30 RX ADMIN — ARFORMOTEROL TARTRATE 15 MCG: 15 SOLUTION RESPIRATORY (INHALATION) at 19:12

## 2025-01-30 RX ADMIN — ARFORMOTEROL TARTRATE 15 MCG: 15 SOLUTION RESPIRATORY (INHALATION) at 06:20

## 2025-01-30 NOTE — THERAPY TREATMENT NOTE
Patient Name: Charlette Rai  : 1935    MRN: [id number]                              Today's Date: 2025       Admit Date: 2025    Visit Dx:     ICD-10-CM ICD-9-CM   1. Edema of left lower extremity  R60.0 782.3   2. ESRD (end stage renal disease)  N18.6 585.6   3. Acute leg pain, left  M79.605 729.5   4. Unable to ambulate  R26.2 719.7   5. Peripheral vascular disease of lower extremity  I73.9 443.9   6. Impaired mobility and ADLs  Z74.09 V49.89    Z78.9      Patient Active Problem List   Diagnosis    Malignant neoplasm of upper lobe of right lung    Rheumatoid arthritis    Asthma    Chronic heart failure with preserved ejection fraction    Essential hypertension    GERD (gastroesophageal reflux disease)    Hyperlipidemia LDL goal <70    Lumbar spinal stenosis    Migraine    Monoclonal paraproteinemia    Osteopenia    Oxygen dependent    Peripheral neuropathy    Stage 4 chronic kidney disease    Vitamin D deficiency    Carotid artery stenosis    Chronic right-sided thoracic back pain    Peripheral vascular disease of lower extremity    Ex-smoker    De Quervain's tenosynovitis    Arthritis of carpometacarpal (CMC) joint of right thumb    Steal syndrome of dialysis vascular access    Anemia of chronic renal failure, stage 4 (severe)    Aortic stenosis, moderate    Hematoma    End stage renal disease on dialysis    Coronary artery calcification seen on CT scan    Frailty syndrome in geriatric patient    COPD with lower respiratory infection    Coagulation defect, unspecified    Hyperphosphatemia    Hypothyroidism (acquired)    Idiopathic osteoarthritis    Secondary multiple arthritis    Type 2 diabetes mellitus with diabetic peripheral angiopathy without gangrene    Acute on chronic respiratory failure with hypoxia and hypercapnia    Elevated troponin level not due myocardial infarction    Renal artery stenosis    S/P renal artery angioplasty    Atherosclerosis of renal artery    Abnormal serum  immunoelectrophoresis    ESRD (end stage renal disease) on dialysis    Hypoxia    NSVT (nonsustained ventricular tachycardia)    Hypertensive heart and chronic kidney disease with heart failure and with stage 5 chronic kidney disease, or end stage renal disease    Unspecified protein-calorie malnutrition    IFG (impaired fasting glucose)    Iron deficiency anemia    AV graft malfunction, initial encounter    AV graft malfunction    Weakness     Past Medical History:   Diagnosis Date    Allergic rhinitis     Anaphylactic shock, unspecified, initial encounter 12/05/2023    Anemia     Arthritis     Asthma     USE INHALERS AND NEBULIZERS    Back pain     Bladder disorder     Cancer     LEFT LUNG CANCER 2005 SURGERY AND CHEMO DONE  AND CURRENTLY 4/ 14/22  RIGHT LUNG CANCER HAS ONLY RECEIVED RADIATION THUS FAR    Chronic kidney disease     stage 3    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 DENIES CP BUT DOES HAVE SOA CHRONIC ISSUE COPD/LUNG CANCER    COPD (chronic obstructive pulmonary disease)     FOLLOWED BY DR MARIAJOSE BAILEY    Coronary artery disease     DENIES CP BUT DOES GET SOA MOST OF THE TIME WITH EXERTION BUT OCC AT REST CHRONIC ISSUE COPD/LUNG CANCER    Deep vein thrombosis     Diabetes mellitus     DOES NOT CHECK BS DAILY    Disease of thyroid gland     HYPOTHYROIDISM    Essential hypertension     Gastric ulcer     GERD (gastroesophageal reflux disease)     Heart murmur     History of transfusion     NO ISSUES POST TRANSFUSION WAS MANY YEARS AGO    Hyperlipemia     Leukocytopenia     FOLLOWED BY DR MIR BAILEY    Limb swelling     Lumbago     Lumbar spinal stenosis     Lung cancer     Migraine headache     Multiple joint pain     On home oxygen therapy     4L/NC PRN    Osteopenia     PNA (pneumonia) 05/04/2024    Pseudomonas pneumonia 04/29/2024     Reflux esophagitis     Shortness of breath     Thyroid nodule     HAS ULTRASOUND YEARLY BEING MONITORED    Vascular disease     Vitamin D deficiency      Past Surgical History:   Procedure Laterality Date    ABDOMINAL SURGERY      ANGIOPLASTY RENAL ARTERY Left 06/14/2024    stent placement    APPENDECTOMY      ARTERIOGRAM N/A 6/14/2024    Procedure: Arteriogram, right radial access, aortogram and renal arteriogram with possible intervention.;  Surgeon: Dio Miguel MD;  Location: [facility] CATH INVASIVE LOCATION;  Service: Peripheral Vascular;  Laterality: N/A;    ARTERIOVENOUS FISTULA/SHUNT SURGERY Left 05/10/2022    Procedure: Left basilic vein transposition;  Surgeon: Wan Barksdale MD;  Location: [facility] MAIN OR;  Service: Vascular;  Laterality: Left;    ARTERIOVENOUS FISTULA/SHUNT SURGERY Left 03/23/2023    Procedure: Ligation of left arm arteriovenous fistula;  Surgeon: Wan Barksdale MD;  Location: [facility] MAIN OR;  Service: Vascular;  Laterality: Left;    ARTERIOVENOUS FISTULA/SHUNT SURGERY Left 11/30/2023    Procedure: Creation of left arm arteriovenous graft;  Surgeon: Wan Barksdale MD;  Location: [facility] MAIN OR;  Service: Vascular;  Laterality: Left;    BRONCHOSCOPY N/A 04/24/2024    Procedure: BRONCHOSCOPY WITH BAL AND WASHINGS;  Surgeon: Khalif Yanez MD;  Location: [facility] ENDOSCOPY;  Service: Pulmonary;  Laterality: N/A;  MUCUS PLUGGING    CARDIAC CATHETERIZATION  1996    CARDIAC SURGERY      CARDIAC SURGERY      fluid drained from heart    CATARACT EXTRACTION, BILATERAL  2003    COLONOSCOPY  2014    ENDOSCOPY  2016    2019    FEMORAL ARTERY STENT Bilateral     HYSTERECTOMY      LUNG BIOPSY Left 2005    lobectomy upper lung caner    LUNG VOLUME REDUCTION      OTHER SURGICAL HISTORY      artifical joints/limbs    REPLACEMENT TOTAL KNEE Left 2016    SHUNT O GRAM N/A 1/13/2025    Procedure: dialysis shuntogram;  Surgeon: Court Valente MD;  Location: [facility] CATH INVASIVE LOCATION;  Service: Peripheral  Vascular;  Laterality: N/A;    UPPER GASTROINTESTINAL ENDOSCOPY        General Information       Row Name 01/30/25 1202          OT Time and Intention    Document Type therapy note (daily note)  -     Mode of Treatment individual therapy;occupational therapy  -     Patient Effort good  -       Row Name 01/30/25 1202          General Information    Patient Profile Reviewed yes  -     Existing Precautions/Restrictions fall  -       Row Name 01/30/25 1202          Cognition    Orientation Status (Cognition) oriented x 3  -       Row Name 01/30/25 1202          Safety Issues/Impairments Affecting Functional Mobility    Impairments Affecting Function (Mobility) balance;endurance/activity tolerance;strength;shortness of breath  -               User Key  (r) = Recorded By, (t) = Taken By, (c) = Cosigned By      Initials Name Provider Type    AC Luh Sears OT Occupational Therapist                     Mobility/ADL's    No documentation.                  Obj/Interventions       Row Name 01/30/25 1205          Sensory Assessment (Somatosensory)    Sensory Assessment (Somatosensory) UE sensation intact  -       Row Name 01/30/25 1205          Vision Assessment/Intervention    Visual Impairment/Limitations WFL;corrective lenses full-time  -       Row Name 01/30/25 1205          Range of Motion Comprehensive    General Range of Motion bilateral upper extremity ROM WFL  -       Row Name 01/30/25 1205          Strength Comprehensive (MMT)    Comment, General Manual Muscle Testing (MMT) Assessment BUE 4-/5  -       Row Name 01/30/25 1205          Shoulder (Therapeutic Exercise)    Shoulder (Therapeutic Exercise) strengthening exercise  -     Shoulder Strengthening (Therapeutic Exercise) bilateral;flexion;extension;horizontal aBduction/aDduction;yellow;10 repetitions;sitting;resistance band  -       Row Name 01/30/25 1205          Elbow/Forearm (Therapeutic Exercise)    Elbow/Forearm (Therapeutic  Exercise) strengthening exercise  -     Elbow/Forearm Strengthening (Therapeutic Exercise) bilateral;flexion;extension;yellow;sitting;resistance band;10 repetitions  -       Row Name 01/30/25 1205          Motor Skills    Motor Skills coordination;functional endurance  -     Coordination WFL  -     Functional Endurance f-  -     Therapeutic Exercise shoulder;elbow/forearm  -               User Key  (r) = Recorded By, (t) = Taken By, (c) = Cosigned By      Initials Name Provider Type    Luh Alves OT Occupational Therapist                   Goals/Plan    No documentation.                  Clinical Impression       Row Name 01/30/25 1208          Pain Assessment    Pretreatment Pain Rating 0/10 - no pain  -AC     Posttreatment Pain Rating 0/10 - no pain  -AC       Row Name 01/30/25 1208          Plan of Care Review    Plan of Care Reviewed With patient  -     Progress no change  -     Outcome Evaluation Patient participated in upper extremity exercises this date for strengthening as well as endurance training. patient to continue with therapy services in order to maximize independence with adls.  -       Row Name 01/30/25 1208          Positioning and Restraints    Pre-Treatment Position sitting in chair/recliner  -     Post Treatment Position chair  -AC     In Chair call light within reach;encouraged to call for assist;exit alarm on;sitting  -               User Key  (r) = Recorded By, (t) = Taken By, (c) = Cosigned By      Initials Name Provider Type    Luh Alves OT Occupational Therapist                   Outcome Measures       Row Name 01/30/25 1215          How much help from another is currently needed...    Putting on and taking off regular lower body clothing? 3  -AC     Bathing (including washing, rinsing, and drying) 3  -AC     Toileting (which includes using toilet bed pan or urinal) 3  -AC     Putting on and taking off regular upper body clothing 4  -AC     Taking care  of personal grooming (such as brushing teeth) 4  -AC     Eating meals 4  -AC     AM-PAC 6 Clicks Score (OT) 21  -AC       Row Name 01/30/25 1215          Functional Assessment    Outcome Measure Options AM-PAC 6 Clicks Daily Activity (OT);Optimal Instrument  -       Row Name 01/30/25 1215          Optimal Instrument    Optimal Instrument Optimal - 3  -AC     Bending/Stooping 3  -AC     Standing 2  -AC     Reaching 1  -AC               User Key  (r) = Recorded By, (t) = Taken By, (c) = Cosigned By      Initials Name Provider Type    Luh Alves OT Occupational Therapist                    Occupational Therapy Education       Title: PT OT SLP Therapies (Done)       Topic: Occupational Therapy (Done)       Point: ADL training (Done)       Description:   Instruct learner(s) on proper safety adaptation and remediation techniques during self care or transfers.   Instruct in proper use of assistive devices.                  Learning Progress Summary            Patient Acceptance, E, VU by  at 1/28/2025 1235                      Point: Home exercise program (Done)       Description:   Instruct learner(s) on appropriate technique for monitoring, assisting and/or progressing therapeutic exercises/activities.                  Learning Progress Summary            Patient Acceptance, E, VU by  at 1/28/2025 1235                      Point: Precautions (Done)       Description:   Instruct learner(s) on prescribed precautions during self-care and functional transfers.                  Learning Progress Summary            Patient Acceptance, E, VU by  at 1/28/2025 1235                      Point: Body mechanics (Done)       Description:   Instruct learner(s) on proper positioning and spine alignment during self-care, functional mobility activities and/or exercises.                  Learning Progress Summary            Patient Acceptance, E, VU by  at 1/28/2025 1235                                      User Key        Initials Effective Dates Name Provider Type Discipline     06/16/21 -  Luh Sears OT Occupational Therapist OT                  OT Recommendation and Plan  Planned Therapy Interventions (OT): activity tolerance training, functional balance retraining, occupation/activity based interventions, ROM/therapeutic exercise, transfer/mobility retraining, patient/caregiver education/training, BADL retraining  Therapy Frequency (OT): 5 times/wk  Plan of Care Review  Plan of Care Reviewed With: patient  Progress: no change  Outcome Evaluation: Patient participated in upper extremity exercises this date for strengthening as well as endurance training. patient to continue with therapy services in order to maximize independence with adls.     Time Calculation:   Evaluation Complexity (OT)  Review Occupational Profile/Medical/Therapy History Complexity: brief/low complexity  Assessment, Occupational Performance/Identification of Deficit Complexity: 1-3 performance deficits  Clinical Decision Making Complexity (OT): problem focused assessment/low complexity  Overall Complexity of Evaluation (OT): low complexity     Time Calculation- OT       Row Name 01/30/25 1218             Time Calculation- OT    OT Received On 01/30/25  -AC         Timed Charges    39851 - OT Therapeutic Exercise Minutes 10  -AC         Total Minutes    Timed Charges Total Minutes 10  -AC       Total Minutes 10  -AC                User Key  (r) = Recorded By, (t) = Taken By, (c) = Cosigned By      Initials Name Provider Type    AC Luh Sears OT Occupational Therapist                  Therapy Charges for Today       Code Description Service Date Service Provider Modifiers Qty    [id number]  OT THER PROC EA 15 MIN 1/30/2025 Luh Sears OT GO 1                 Luh Sears OT  1/30/2025

## 2025-01-30 NOTE — PLAN OF CARE
Goal Outcome Evaluation:              Outcome Evaluation: Patient AOx4 on 3 LNC, patient dressing intact from angiogram on 01/29/25, medication patient 2 with PRN  pain medication per MAR. No new issue at this time

## 2025-01-30 NOTE — SIGNIFICANT NOTE
01/30/25 1556   OTHER   Discipline physical therapist   Rehab Time/Intention   Session Not Performed patient unavailable for evaluation  ([id number] with Dr. Valentin and respiratory therapy)

## 2025-01-30 NOTE — PROGRESS NOTES
" LOS: 1 day   Patient Care Team:  Brennan Mosqueda MD as PCP - General (Internal Medicine)  Regis Mckeon MD as Consulting Physician (Radiation Oncology)  Carlton Casillas MD as Consulting Physician (Gastroenterology)  Susan Marcos APRN as Nurse Practitioner (Gastroenterology)    Chief Complaint: ESRD    Subjective     Pt without any acute complaints  Sitting up in chair.  No new events.    History taken from: patient chart RN    Objective     Vital Sign Min/Max for last 24 hours  Temp  Min: 97.2 °F (36.2 °C)  Max: 98.7 °F (37.1 °C)   BP  Min: 94/74  Max: 169/55   Pulse  Min: 63  Max: 88   Resp  Min: 16  Max: 20   SpO2  Min: 89 %  Max: 100 %   Flow (L/min) (Oxygen Therapy)  Min: 3  Max: 4   No data recorded     Flowsheet Rows      Flowsheet Row First Filed Value   Admission Height 162.6 cm (64\") Documented at 01/26/2025 2147   Admission Weight 66.6 kg (146 lb 13.2 oz) Documented at 01/26/2025 2147            No intake/output data recorded.  I/O last 3 completed shifts:  In: 1845 [P.O.:745; I.V.:1100]  Out: 1505 [Blood:5]    Objective:  General Appearance:  Comfortable, in no acute distress and not in pain.    Vital signs: (most recent): Blood pressure (!) 163/34, pulse 75, temperature 98.1 °F (36.7 °C), temperature source Oral, resp. rate 18, height 165.1 cm (65\"), weight 64.5 kg (142 lb 3.2 oz), SpO2 100%, not currently breastfeeding.  Vital signs are normal.  No fever.    HEENT: Normal HEENT exam.    Lungs:  Normal effort and normal respiratory rate.    Heart: Normal rate.  Regular rhythm.    Abdomen: Abdomen is soft.  Bowel sounds are normal.   There is no abdominal tenderness.     Extremities: Normal range of motion.  There is no dependent edema.    Pulses: Distal pulses are intact.    Neurological: Patient is alert and oriented to person, place and time.    Pupils:  Pupils are equal, round, and reactive to light.    Skin:  Warm and dry.            Unchanged    Results Review:     I " "reviewed the patient's new clinical results.  I reviewed the patient's new imaging results and agree with the interpretation.  I reviewed the patient's other test results and agree with the interpretation    WBC WBC   Date Value Ref Range Status   01/30/2025 4.28 3.40 - 10.80 10*3/mm3 Final   01/29/2025 4.09 3.40 - 10.80 10*3/mm3 Final   01/28/2025 4.64 3.40 - 10.80 10*3/mm3 Final   01/27/2025 5.44 3.40 - 10.80 10*3/mm3 Final      HGB Hemoglobin   Date Value Ref Range Status   01/30/2025 7.1 (L) 12.0 - 15.9 g/dL Final   01/29/2025 7.3 (L) 12.0 - 15.9 g/dL Final   01/28/2025 8.1 (L) 12.0 - 15.9 g/dL Final   01/27/2025 8.5 (L) 12.0 - 15.9 g/dL Final      HCT Hematocrit   Date Value Ref Range Status   01/30/2025 24.4 (L) 34.0 - 46.6 % Final   01/29/2025 26.3 (L) 34.0 - 46.6 % Final   01/28/2025 25.9 (L) 34.0 - 46.6 % Final   01/27/2025 30.2 (L) 34.0 - 46.6 % Final      Platlets No results found for: \"LABPLAT\"   MCV MCV   Date Value Ref Range Status   01/30/2025 107.5 (H) 79.0 - 97.0 fL Final   01/29/2025 112.4 (H) 79.0 - 97.0 fL Final   01/28/2025 108.4 (H) 79.0 - 97.0 fL Final   01/27/2025 109.4 (H) 79.0 - 97.0 fL Final          Sodium Sodium   Date Value Ref Range Status   01/30/2025 136 136 - 145 mmol/L Final   01/29/2025 135 (L) 136 - 145 mmol/L Final   01/28/2025 139 136 - 145 mmol/L Final      Potassium Potassium   Date Value Ref Range Status   01/30/2025 4.0 3.5 - 5.2 mmol/L Final   01/29/2025 4.3 3.5 - 5.2 mmol/L Final     Comment:     Slight hemolysis detected by analyzer. Result may be falsely elevated.   01/28/2025 4.2 3.5 - 5.2 mmol/L Final      Chloride Chloride   Date Value Ref Range Status   01/30/2025 103 98 - 107 mmol/L Final   01/29/2025 104 98 - 107 mmol/L Final   01/28/2025 106 98 - 107 mmol/L Final      CO2 CO2   Date Value Ref Range Status   01/30/2025 25.8 22.0 - 29.0 mmol/L Final   01/29/2025 16.2 (L) 22.0 - 29.0 mmol/L Final   01/28/2025 24.1 22.0 - 29.0 mmol/L Final      BUN BUN   Date Value " "Ref Range Status   01/30/2025 19 8 - 23 mg/dL Final   01/29/2025 32 (H) 8 - 23 mg/dL Final   01/28/2025 18 8 - 23 mg/dL Final      Creatinine Creatinine   Date Value Ref Range Status   01/30/2025 3.18 (H) 0.57 - 1.00 mg/dL Final   01/29/2025 4.62 (H) 0.57 - 1.00 mg/dL Final   01/28/2025 3.47 (H) 0.57 - 1.00 mg/dL Final      Calcium Calcium   Date Value Ref Range Status   01/30/2025 7.6 (L) 8.6 - 10.5 mg/dL Final   01/29/2025 7.7 (L) 8.6 - 10.5 mg/dL Final   01/28/2025 8.0 (L) 8.6 - 10.5 mg/dL Final      PO4 No results found for: \"CAPO4\"   Albumin Albumin   Date Value Ref Range Status   01/30/2025 2.7 (L) 3.5 - 5.2 g/dL Final   01/29/2025 2.4 (L) 3.5 - 5.2 g/dL Final   01/28/2025 3.0 (L) 3.5 - 5.2 g/dL Final      Magnesium Magnesium   Date Value Ref Range Status   01/30/2025 2.0 1.6 - 2.4 mg/dL Final   01/29/2025 2.1 1.6 - 2.4 mg/dL Final   01/28/2025 2.0 1.6 - 2.4 mg/dL Final      Uric Acid No results found for: \"URICACID\"     Medication Review:   arformoterol, 15 mcg, Nebulization, BID - RT  aspirin, 81 mg, Oral, Daily  budesonide, 0.5 mg, Nebulization, BID - RT  carvedilol, 25 mg, Oral, BID With Meals  fluticasone, 1 spray, Nasal, Daily  ipratropium-albuterol, 3 mL, Nebulization, Q4H - RT  isosorbide mononitrate, 30 mg, Oral, Nightly  levothyroxine, 50 mcg, Oral, Q AM  rosuvastatin, 20 mg, Oral, Nightly  sevelamer, 800 mg, Oral, TID With Meals  sodium chloride, 10 mL, Intravenous, Q12H  tobramycin PF, 300 mg, Nebulization, BID - RT          Assessment & Plan       Weakness    Peripheral vascular disease of lower extremity      Assessment & Plan  - ESRD, dialysis 3 times a week at [facility] through right IJ TDC.  Continue same m/w/f.      - Hypertension, improved after dialysis, resume blood pressure medications per home dosing and monitor.    - Anemia, severe, iron studies were equivocal low TSAT but high ferritin possibly inflammatory state.  Transfuse if needed, on long-acting CASSANDRA as " outpatient.    - Severe peripheral arterial disease as noted on CT angiogram earlier today.  Status post traditional angiogram per vascular, no intervention reported.      - Possible lung scarring versus consolidation/mass, pulmonary consulted.  CT-guided biopsy planned for Friday.    - Hypothyroidism, per primary.    - Type 2 diabetes with complications, per primary.    - Diastolic dysfunction/LVH and aortic stenosis.    - Renal mass, followed by urology as outpatient.      Elliott Aviles MD  01/30/25  09:50 EST

## 2025-01-30 NOTE — PROGRESS NOTES
[facility]   Hospitalist Progress Note  Date: 2025  Patient Name: Charlette Rai  : 1935  MRN: [id number]  Date of admission: 2025  Room/Bed: [facility]      Subjective   Subjective     Chief Complaint: Weakness    Summary:Charlette Rai is a 89 y.o. female past medical history of ESRD on HD , peripheral arterial disease status post lower extremity stenting, renal artery stenosis status post tenting, hypothyroidism, COPD on 3 L nasal cannula, heart failure preserved ejection fraction, prior lung cancer status post chemotherapy and right upper lobectomy who presents to the ER due to new onset left lower extremity swelling with pain and weakness.  Patient states that roughly 4 days ago she started noticed increasing swelling below her left knee and has progressively worsened in the last 3 days to the point that it is up to her hip.  In that time she was also had progressive weakness and inability bear weight on her left lower extremity due to pain that ultimately prompted her family to advise her to come into the ER tonight for evaluation     On arrival patient was hemodynamically stable and on her home level of oxygen.  Lab workup was revealing of a mildly elevated INR of 1.2, chronically elevated creatinine of 4.7 and chronic anemia with hemoglobin of 7.5 as well as platelets of 116.  Hip imaging was done and did not reveal any fractures and a an ultrasound of the left lower extremity is pending at this time.  Attempt was made to ambulate the patient in the ER but this was unsuccessful as she has difficulty bearing weight on this and as a result hospitalist was then contacted for admission for deconditioning and debility related to a possible DVT in the left lower extremity.  Signed my exam patient is resting comfortably and is able to lift her leg in bed but states that she cannot bear any weight on it.  Denies having any trauma recently.  No recent surgery to the left  lower extremity.  Denies any history of DVTs.  She has not missed any dialysis.    Interval Followup: No acute overnight events.  No acute distress.  Patient resting in the bedside chair today, getting a nebulizer treatment with respiratory.  She reports that her left lower extremity is feeling a little bit better, she is able to tolerate more weightbearing.  Otherwise, no new complaints or concerns.    Objective   Objective     Vitals:   Temp:  [97.3 °F (36.3 °C)-98.7 °F (37.1 °C)] 97.7 °F (36.5 °C)  Heart Rate:  [56-80] 56  Resp:  [16-20] 18  BP: (114-163)/(33-69) 129/33  Flow (L/min) (Oxygen Therapy):  [3-4] 4    Physical Exam   Gen: NAD, Alert and Oriented  Pulm: Nasal cannula, no respiratory distress  Abd: soft, nondistended  Extremities: no pitting edema    Result Review    Result Review:  I have personally reviewed these results:  [x]  Laboratory      Lab 01/30/25  0620 01/29/25  0546 01/28/25  1315 01/28/25  0511 01/27/25  2203 01/27/25  1416 01/26/25  2204   WBC 4.28 4.09  --  4.64  --  5.44 3.90   HEMOGLOBIN 7.1* 7.3*  --  8.1*  --  8.5* 7.5*   HEMATOCRIT 24.4* 26.3*  --  25.9*  --  30.2* 26.5*   PLATELETS 98* 101*  --  138*  --  127* 116*   NEUTROS ABS 2.70 3.15  --  2.90  --  4.14 2.35   IMMATURE GRANS (ABS) 0.01  --   --  0.01  --  0.02 0.01   LYMPHS ABS 0.61*  --   --  0.75  --  0.49* 0.65*   MONOS ABS 0.80  --   --  0.82  --  0.63 0.72   EOS ABS 0.15 0.08  --  0.14  --  0.14 0.15   .5* 112.4*  --  108.4*  --  109.4* 109.5*   PROTIME  --   --  15.3*  --   --  14.1 15.4*   APTT 35.5*  --  46.9* 151.7*   < > 29.9* 32.0    < > = values in this interval not displayed.         Lab 01/30/25  0620 01/29/25  0546 01/28/25  0511   SODIUM 136 135* 139   POTASSIUM 4.0 4.3 4.2   CHLORIDE 103 104 106   CO2 25.8 16.2* 24.1   ANION GAP 7.2 14.8 8.9   BUN 19 32* 18   CREATININE 3.18* 4.62* 3.47*   EGFR 13.6* 8.7* 12.3*   GLUCOSE 97 80 102*   CALCIUM 7.6* 7.7* 8.0*   MAGNESIUM 2.0 2.1 2.0   PHOSPHORUS 4.2 5.3*  4.8*         Lab 01/30/25  0620 01/29/25  0546 01/28/25  0511 01/26/25  2204   TOTAL PROTEIN  --   --   --  5.8*   ALBUMIN 2.7* 2.4* 3.0* 3.3*   GLOBULIN  --   --   --  2.5   ALT (SGPT)  --   --   --  14   AST (SGOT)  --   --   --  25   BILIRUBIN  --   --   --  0.2   ALK PHOS  --   --   --  292*         Lab 01/28/25  1315 01/27/25  1416 01/26/25  2204   PROTIME 15.3* 14.1 15.4*   INR 1.18* 1.07 1.20*             Lab 01/28/25  0511 01/27/25  1416 01/26/25  2204   IRON  --   --  41   IRON SATURATION (TSAT)  --   --  17*   TIBC  --   --  235*   TRANSFERRIN  --   --  158*   FERRITIN  --   --  1,048.00*   ABO TYPING O   < >  --    RH TYPING Positive   < >  --    ANTIBODY SCREEN Negative  --   --     < > = values in this interval not displayed.         Brief Urine Lab Results  (Last result in the past 365 days)        Color   Clarity   Blood   Leuk Est   Nitrite   Protein   CREAT   Urine HCG        05/01/24 2102 Dark Yellow   Cloudy   Negative   Small (1+)   Negative   >=300 mg/dL (3+)                 [x]  Microbiology   Microbiology Results (last 10 days)       ** No results found for the last 240 hours. **          [x]  Radiology  CT Chest Without Contrast Diagnostic    Result Date: 1/28/2025     1. There is a new irregular 3.3 cm subpleural opacity in the right lower lobe. It may be partially cavitary. A malignant process is possible. Consider biopsy for further evaluation. Nuclear Medicine (NM) whole-body FDG-PET-CT scan may be helpful in further evaluation, as well.  2. Please see above comments for further detail.    Portions of this note were completed with a voice recognition program.  Electronically Signed By-Chicho Jones MD On:1/28/2025 4:46 AM      CT Angio Abdominal Aorta Bilateral Iliofem Runoff    Result Date: 1/27/2025  Impression: 1. Chronic bilateral SFA occlusions stable since 2020. Bilateral distal at the level of the popliteal arteries through chronic developed collaterals, with three-vessel runoff  to the level of the ankle bilaterally. 2. Severe intra-abdominal atherosclerotic disease including high-grade stenosis of proximal celiac artery, proximal SMA and proximal right renal artery. Mild ectatic dilation of the infrarenal abdominal aorta. All vascular stents in the abdomen are patent. No evidence of solid organ or hollow viscus ischemia. 3. Stable left common femoral artery pseudoaneurysm since 5/31/2024. 4. Masslike right lower lobe consolidation which could reflect a focal pneumonia versus potential neoplasm in patient with prior history of lung malignancy. Consider pulmonary consultation for further management. 5. Findings suggesting volume overload/third spacing including small right pleural effusion, interstitial pulmonary edema and right lower lobe groundglass suspicious for early alveolar edema versus nonspecific infectious/inflammatory process. Body wall and bilateral lower extremity edema, left greater than right. 6. Cholelithiasis. 7. Numerous additional chronic/ancillary findings as above. Electronically Signed: Ildefonso Purcell MD  1/27/2025 1:11 PM EST  Workstation ID: [id number]    XR Hip With or Without Pelvis 2 - 3 View Left    Result Date: 1/27/2025  No acute fracture or acute malalignment is identified.   Portions of this note were completed with a voice recognition program.   Electronically Signed By-Chicho Jones MD On:1/27/2025 5:15 AM     []  EKG/Telemetry   []  Cardiology/Vascular   []  Pathology  []  Old records  []  Other:    Assessment & Plan   Assessment / Plan     Assessment:  Left lower extremity pain  Left lower extremity weakness secondary to pain  Chronic hypoxic respiratory failure, 3 L baseline  HFpEF, not in acute exacerbation  COPD, not in acute exacerbation  ESRD on HD MWF  Chronic microcytic anemia  Hypertension  History of JONAH s/p stenting  PAD s/p lower extremity stenting  Hypothyroidism  Recurrent Pseudomonas pneumonia, history JUAN nebs  GERD  History of lung cancer  with new pulmonary nodule    Plan:  Continue inpatient admission  Pulmonology consulted.  CT chest with new irregular 3.3 cm subpleural right lower lobe lesion.  IR guided biopsy ordered for Friday.  Nephrology following.  Continue dialysis MWF.  Right IJ TDC.  Vascular following.  Patient has multiple chronic occlusions.  S/p angiogram with no intervention.    Continue Brovana, Pulmicort  Continue JUAN nebs, looks like it was filled in December, no recent pulm note outpatient, will confirm with pulmonology team if she still needs this  Home meds reviewed.  Continue aspirin, hold Plavix for possible biopsy.  Hemoglobin stable  Platelets improved  PT/OT  A.m. labs       Discussed with RN.    VTE Prophylaxis:  Pharmacologic VTE prophylaxis orders are present.        CODE STATUS:   Code Status (Patient has no pulse and is not breathing): CPR (Attempt to Resuscitate)  Medical Interventions (Patient has pulse or is breathing): Full Support      Electronically signed by Dariana Valentin DO, 1/30/2025, 15:30 EST.

## 2025-01-31 ENCOUNTER — APPOINTMENT (OUTPATIENT)
Dept: GENERAL RADIOLOGY | Facility: HOSPITAL | Age: 89
End: 2025-01-31
Payer: MEDICARE

## 2025-01-31 ENCOUNTER — APPOINTMENT (OUTPATIENT)
Dept: CT IMAGING | Facility: HOSPITAL | Age: 89
End: 2025-01-31
Payer: MEDICARE

## 2025-01-31 LAB
ALBUMIN SERPL-MCNC: 2.6 G/DL (ref 3.5–5.2)
ANION GAP SERPL CALCULATED.3IONS-SCNC: 9.8 MMOL/L (ref 5–15)
BUN SERPL-MCNC: 30 MG/DL (ref 8–23)
BUN/CREAT SERPL: 6.5 (ref 7–25)
CALCIUM SPEC-SCNC: 7.6 MG/DL (ref 8.6–10.5)
CHLORIDE SERPL-SCNC: 102 MMOL/L (ref 98–107)
CO2 SERPL-SCNC: 23.2 MMOL/L (ref 22–29)
CREAT SERPL-MCNC: 4.6 MG/DL (ref 0.57–1)
EGFRCR SERPLBLD CKD-EPI 2021: 8.8 ML/MIN/1.73
GLUCOSE SERPL-MCNC: 99 MG/DL (ref 65–99)
HCT VFR BLD AUTO: 24.2 % (ref 34–46.6)
HGB BLD-MCNC: 7 G/DL (ref 12–15.9)
PHOSPHATE SERPL-MCNC: 5.4 MG/DL (ref 2.5–4.5)
POTASSIUM SERPL-SCNC: 4 MMOL/L (ref 3.5–5.2)
SODIUM SERPL-SCNC: 135 MMOL/L (ref 136–145)
WHOLE BLOOD HOLD SPECIMEN: NORMAL

## 2025-01-31 PROCEDURE — 88342 IMHCHEM/IMCYTCHM 1ST ANTB: CPT | Performed by: STUDENT IN AN ORGANIZED HEALTH CARE EDUCATION/TRAINING PROGRAM

## 2025-01-31 PROCEDURE — 97161 PT EVAL LOW COMPLEX 20 MIN: CPT

## 2025-01-31 PROCEDURE — 85014 HEMATOCRIT: CPT | Performed by: STUDENT IN AN ORGANIZED HEALTH CARE EDUCATION/TRAINING PROGRAM

## 2025-01-31 PROCEDURE — 0BBF3ZX EXCISION OF RIGHT LOWER LUNG LOBE, PERCUTANEOUS APPROACH, DIAGNOSTIC: ICD-10-PCS | Performed by: STUDENT IN AN ORGANIZED HEALTH CARE EDUCATION/TRAINING PROGRAM

## 2025-01-31 PROCEDURE — 88305 TISSUE EXAM BY PATHOLOGIST: CPT | Performed by: STUDENT IN AN ORGANIZED HEALTH CARE EDUCATION/TRAINING PROGRAM

## 2025-01-31 PROCEDURE — 94799 UNLISTED PULMONARY SVC/PX: CPT

## 2025-01-31 PROCEDURE — 25010000002 FENTANYL CITRATE (PF) 50 MCG/ML SOLUTION: Performed by: RADIOLOGY

## 2025-01-31 PROCEDURE — 88341 IMHCHEM/IMCYTCHM EA ADD ANTB: CPT | Performed by: STUDENT IN AN ORGANIZED HEALTH CARE EDUCATION/TRAINING PROGRAM

## 2025-01-31 PROCEDURE — 94664 DEMO&/EVAL PT USE INHALER: CPT

## 2025-01-31 PROCEDURE — 71045 X-RAY EXAM CHEST 1 VIEW: CPT

## 2025-01-31 PROCEDURE — 80069 RENAL FUNCTION PANEL: CPT | Performed by: SURGERY

## 2025-01-31 PROCEDURE — 99153 MOD SED SAME PHYS/QHP EA: CPT

## 2025-01-31 PROCEDURE — 25010000002 MIDAZOLAM PER 1MG: Performed by: RADIOLOGY

## 2025-01-31 PROCEDURE — 94761 N-INVAS EAR/PLS OXIMETRY MLT: CPT

## 2025-01-31 PROCEDURE — 99239 HOSP IP/OBS DSCHRG MGMT >30: CPT | Performed by: STUDENT IN AN ORGANIZED HEALTH CARE EDUCATION/TRAINING PROGRAM

## 2025-01-31 PROCEDURE — 99152 MOD SED SAME PHYS/QHP 5/>YRS: CPT

## 2025-01-31 PROCEDURE — 25010000002 HEPARIN (PORCINE) PER 1000 UNITS: Performed by: SURGERY

## 2025-01-31 PROCEDURE — 85018 HEMOGLOBIN: CPT | Performed by: STUDENT IN AN ORGANIZED HEALTH CARE EDUCATION/TRAINING PROGRAM

## 2025-01-31 RX ORDER — HYDROCODONE BITARTRATE AND ACETAMINOPHEN 5; 325 MG/1; MG/1
1 TABLET ORAL EVERY 4 HOURS PRN
Qty: 18 TABLET | Refills: 0 | Status: SHIPPED | OUTPATIENT
Start: 2025-01-31 | End: 2025-02-01

## 2025-01-31 RX ORDER — MIDAZOLAM HYDROCHLORIDE 2 MG/2ML
INJECTION, SOLUTION INTRAMUSCULAR; INTRAVENOUS AS NEEDED
Status: COMPLETED | OUTPATIENT
Start: 2025-01-31 | End: 2025-01-31

## 2025-01-31 RX ORDER — FENTANYL CITRATE 50 UG/ML
INJECTION, SOLUTION INTRAMUSCULAR; INTRAVENOUS AS NEEDED
Status: COMPLETED | OUTPATIENT
Start: 2025-01-31 | End: 2025-01-31

## 2025-01-31 RX ORDER — LIDOCAINE HYDROCHLORIDE 20 MG/ML
INJECTION, SOLUTION INFILTRATION; PERINEURAL
Status: DISPENSED
Start: 2025-01-31 | End: 2025-01-31

## 2025-01-31 RX ADMIN — ARFORMOTEROL TARTRATE 15 MCG: 15 SOLUTION RESPIRATORY (INHALATION) at 06:49

## 2025-01-31 RX ADMIN — ROSUVASTATIN 20 MG: 20 TABLET, FILM COATED ORAL at 20:13

## 2025-01-31 RX ADMIN — CARVEDILOL 25 MG: 25 TABLET, FILM COATED ORAL at 18:17

## 2025-01-31 RX ADMIN — FLUTICASONE PROPIONATE 1 SPRAY: 50 SPRAY, METERED NASAL at 11:56

## 2025-01-31 RX ADMIN — HYDROCODONE BITARTRATE AND ACETAMINOPHEN 1 TABLET: 5; 325 TABLET ORAL at 20:42

## 2025-01-31 RX ADMIN — ARFORMOTEROL TARTRATE 15 MCG: 15 SOLUTION RESPIRATORY (INHALATION) at 19:18

## 2025-01-31 RX ADMIN — TOBRAMYCIN 300 MG: 300 SOLUTION RESPIRATORY (INHALATION) at 19:31

## 2025-01-31 RX ADMIN — SEVELAMER CARBONATE 800 MG: 800 TABLET, FILM COATED ORAL at 11:55

## 2025-01-31 RX ADMIN — FENTANYL CITRATE 50 MCG: 50 INJECTION, SOLUTION INTRAMUSCULAR; INTRAVENOUS at 10:13

## 2025-01-31 RX ADMIN — IPRATROPIUM BROMIDE AND ALBUTEROL SULFATE 3 ML: .5; 3 SOLUTION RESPIRATORY (INHALATION) at 19:18

## 2025-01-31 RX ADMIN — BUDESONIDE 0.5 MG: 0.5 INHALANT ORAL at 19:18

## 2025-01-31 RX ADMIN — BUDESONIDE 0.5 MG: 0.5 INHALANT ORAL at 06:49

## 2025-01-31 RX ADMIN — Medication 10 ML: at 20:13

## 2025-01-31 RX ADMIN — HYDROCODONE BITARTRATE AND ACETAMINOPHEN 1 TABLET: 5; 325 TABLET ORAL at 14:42

## 2025-01-31 RX ADMIN — TOBRAMYCIN 300 MG: 300 SOLUTION RESPIRATORY (INHALATION) at 06:49

## 2025-01-31 RX ADMIN — IPRATROPIUM BROMIDE AND ALBUTEROL SULFATE 3 ML: .5; 3 SOLUTION RESPIRATORY (INHALATION) at 06:49

## 2025-01-31 RX ADMIN — ASPIRIN 81 MG CHEWABLE TABLET 81 MG: 81 TABLET CHEWABLE at 11:55

## 2025-01-31 RX ADMIN — HEPARIN SODIUM 3200 UNITS: 1000 INJECTION INTRAVENOUS; SUBCUTANEOUS at 17:24

## 2025-01-31 RX ADMIN — MIDAZOLAM HYDROCHLORIDE 1 MG: 1 INJECTION, SOLUTION INTRAMUSCULAR; INTRAVENOUS at 10:15

## 2025-01-31 RX ADMIN — ISOSORBIDE MONONITRATE 30 MG: 30 TABLET, EXTENDED RELEASE ORAL at 20:12

## 2025-01-31 RX ADMIN — SEVELAMER CARBONATE 800 MG: 800 TABLET, FILM COATED ORAL at 18:17

## 2025-01-31 RX ADMIN — Medication 10 ML: at 11:57

## 2025-01-31 NOTE — PROGRESS NOTES
" LOS: 2 days   Patient Care Team:  Brennan Mosqueda MD as PCP - General (Internal Medicine)  Regis Mckeon MD as Consulting Physician (Radiation Oncology)  Carlton Casillas MD as Consulting Physician (Gastroenterology)  Susan Marcos APRN as Nurse Practitioner (Gastroenterology)    Chief Complaint: ESRD    Subjective     Pt seen in dialysis.  Complaining of bilateral groin pain especially the right side.  Status post lung biopsy.      History taken from: patient chart RN    Objective     Vital Sign Min/Max for last 24 hours  Temp  Min: 97.3 °F (36.3 °C)  Max: 97.9 °F (36.6 °C)   BP  Min: 100/44  Max: 175/54   Pulse  Min: 69  Max: 118   Resp  Min: 12  Max: 22   SpO2  Min: 94 %  Max: 100 %   Flow (L/min) (Oxygen Therapy)  Min: 3  Max: 15   No data recorded     Flowsheet Rows      Flowsheet Row First Filed Value   Admission Height 162.6 cm (64\") Documented at 01/26/2025 2147   Admission Weight 66.6 kg (146 lb 13.2 oz) Documented at 01/26/2025 2147            No intake/output data recorded.  I/O last 3 completed shifts:  In: 720 [P.O.:720]  Out: 50 [Urine:50]    Objective:  General Appearance:  Comfortable, in no acute distress and in pain.    Vital signs: (most recent): Blood pressure 116/71, pulse 74, temperature 97.5 °F (36.4 °C), temperature source Oral, resp. rate 16, height 165.1 cm (65\"), weight 64.5 kg (142 lb 3.2 oz), SpO2 98%, not currently breastfeeding.  Vital signs are normal.  No fever.    HEENT: Normal HEENT exam.    Lungs:  Normal effort and normal respiratory rate.  She is not in respiratory distress.    Heart: Normal rate.  Regular rhythm.    Abdomen: Abdomen is soft.  Bowel sounds are normal.   There is no abdominal tenderness.     Extremities: Normal range of motion.  There is no dependent edema.    Pulses: Distal pulses are intact.    Neurological: Patient is alert and oriented to person, place and time.    Pupils:  Pupils are equal, round, and reactive to light.    Skin:  " "Warm and dry.            Unchanged    Results Review:     I reviewed the patient's new clinical results.  I reviewed the patient's new imaging results and agree with the interpretation.  I reviewed the patient's other test results and agree with the interpretation    WBC WBC   Date Value Ref Range Status   01/30/2025 4.28 3.40 - 10.80 10*3/mm3 Final   01/29/2025 4.09 3.40 - 10.80 10*3/mm3 Final      HGB Hemoglobin   Date Value Ref Range Status   01/31/2025 7.0 (L) 12.0 - 15.9 g/dL Final   01/30/2025 7.1 (L) 12.0 - 15.9 g/dL Final   01/29/2025 7.3 (L) 12.0 - 15.9 g/dL Final      HCT Hematocrit   Date Value Ref Range Status   01/31/2025 24.2 (L) 34.0 - 46.6 % Final   01/30/2025 24.4 (L) 34.0 - 46.6 % Final   01/29/2025 26.3 (L) 34.0 - 46.6 % Final      Platlets No results found for: \"LABPLAT\"   MCV MCV   Date Value Ref Range Status   01/30/2025 107.5 (H) 79.0 - 97.0 fL Final   01/29/2025 112.4 (H) 79.0 - 97.0 fL Final          Sodium Sodium   Date Value Ref Range Status   01/31/2025 135 (L) 136 - 145 mmol/L Final   01/30/2025 136 136 - 145 mmol/L Final   01/29/2025 135 (L) 136 - 145 mmol/L Final      Potassium Potassium   Date Value Ref Range Status   01/31/2025 4.0 3.5 - 5.2 mmol/L Final   01/30/2025 4.0 3.5 - 5.2 mmol/L Final   01/29/2025 4.3 3.5 - 5.2 mmol/L Final     Comment:     Slight hemolysis detected by analyzer. Result may be falsely elevated.      Chloride Chloride   Date Value Ref Range Status   01/31/2025 102 98 - 107 mmol/L Final   01/30/2025 103 98 - 107 mmol/L Final   01/29/2025 104 98 - 107 mmol/L Final      CO2 CO2   Date Value Ref Range Status   01/31/2025 23.2 22.0 - 29.0 mmol/L Final   01/30/2025 25.8 22.0 - 29.0 mmol/L Final   01/29/2025 16.2 (L) 22.0 - 29.0 mmol/L Final      BUN BUN   Date Value Ref Range Status   01/31/2025 30 (H) 8 - 23 mg/dL Final   01/30/2025 19 8 - 23 mg/dL Final   01/29/2025 32 (H) 8 - 23 mg/dL Final      Creatinine Creatinine   Date Value Ref Range Status   01/31/2025 " "4.60 (H) 0.57 - 1.00 mg/dL Final   01/30/2025 3.18 (H) 0.57 - 1.00 mg/dL Final   01/29/2025 4.62 (H) 0.57 - 1.00 mg/dL Final      Calcium Calcium   Date Value Ref Range Status   01/31/2025 7.6 (L) 8.6 - 10.5 mg/dL Final   01/30/2025 7.6 (L) 8.6 - 10.5 mg/dL Final   01/29/2025 7.7 (L) 8.6 - 10.5 mg/dL Final      PO4 No results found for: \"CAPO4\"   Albumin Albumin   Date Value Ref Range Status   01/31/2025 2.6 (L) 3.5 - 5.2 g/dL Final   01/30/2025 2.7 (L) 3.5 - 5.2 g/dL Final   01/29/2025 2.4 (L) 3.5 - 5.2 g/dL Final      Magnesium Magnesium   Date Value Ref Range Status   01/30/2025 2.0 1.6 - 2.4 mg/dL Final   01/29/2025 2.1 1.6 - 2.4 mg/dL Final      Uric Acid No results found for: \"URICACID\"     Medication Review:   arformoterol, 15 mcg, Nebulization, BID - RT  aspirin, 81 mg, Oral, Daily  budesonide, 0.5 mg, Nebulization, BID - RT  carvedilol, 25 mg, Oral, BID With Meals  fluticasone, 1 spray, Nasal, Daily  ipratropium-albuterol, 3 mL, Nebulization, Q4H - RT  isosorbide mononitrate, 30 mg, Oral, Nightly  levothyroxine, 50 mcg, Oral, Q AM  lidocaine, , ,   rosuvastatin, 20 mg, Oral, Nightly  sevelamer, 800 mg, Oral, TID With Meals  sodium chloride, 10 mL, Intravenous, Q12H  tobramycin PF, 300 mg, Nebulization, BID - RT          Assessment & Plan       Weakness    Peripheral vascular disease of lower extremity      Assessment & Plan  - ESRD, dialysis 3 times a week at [facility] through right IJ TDC.  Continue same m/w/f.      - Hypertension, improved after dialysis, resume blood pressure medications per home dosing and monitor.    - Anemia, severe, iron studies were equivocal low TSAT but high ferritin possibly inflammatory state.  Transfuse if needed, on long-acting CASSANDRA as outpatient.  Check CBC in AM.    - Severe peripheral arterial disease as noted on CT angiogram earlier today.  Status post traditional angiogram per vascular, no intervention reported.  Having no bilateral groin pain.  Small hematoma " noted left groin, some crepitus noted right groin, etiology is uncertain, will defer to vascular and primary.    - Possible lung scarring versus consolidation/mass, pulmonary consulted.  CT-guided biopsy performed today, results pending    - Hypothyroidism, per primary.    - Type 2 diabetes with complications, per primary.    - Diastolic dysfunction/LVH and aortic stenosis.    - Renal mass, followed by urology as outpatient.    Discussed with dialysis staff.      Edi García MD  01/31/25  16:31 EST

## 2025-01-31 NOTE — NURSING NOTE
Duration of Treatment 4.0 Hours    Access Site AVF    Dialyzer Revaclear     mL/min    Dialysate Temperature (C) 36    BFR-As tolerated to a maximum of: 400 mL/min    Dialysate Solution Bath: K+ = 3 mEq, CA = 2.5mg    Bicarb 33 mEq    Na+ 137 meq    Fluid Removal: 2L      Patient tolerated treatment well. Ran 3.5 hrs per patient request. Removed 2000mls UF with BVP 84  Reported off to Latesha KHAN

## 2025-01-31 NOTE — PLAN OF CARE
Goal Outcome Evaluation:  Plan of Care Reviewed With: patient        Progress: no change  Outcome Evaluation: Pt remained A&Ox4 this shift. Also, pt remained on 3L via nasal cannula maintaining stable oxygen sautration. Pt was medicated with PRN Norco to help relieve pain. Pt underwent CT needle biopsy lung. Pt tolerated well. Pt was advanced to a Renal diet, low Na+, Phos and K diet. Pt tolerated well. Pt also underwent dialysis this shift. Pt tolerated well. Pt is to dc to home.

## 2025-01-31 NOTE — PLAN OF CARE
Goal Outcome Evaluation:  Plan of Care Reviewed With: patient        Progress: no change  Outcome Evaluation: Pt presents with limitations that impede their ability to safely and independently transfer and ambulate. The skills of a therapist will be required to safely and effectively implement the following treatment plan to restore maximal level of function.    Anticipated Discharge Disposition (PT): inpatient rehabilitation facility, home with home health, home with assist

## 2025-01-31 NOTE — DISCHARGE SUMMARY
[facility]         HOSPITALIST  DISCHARGE SUMMARY    Patient Name: Charlette Rai  : 1935  MRN: [id number]    Date of Admission: 2025  Date of Discharge:  2025    Primary Care Physician: Brennan Mosqueda MD    Consults       Date and Time Order Name Status Description    2025  1:44 PM Inpatient Pulmonology Consult Completed     2025  9:45 AM Inpatient Vascular Surgery Consult Completed     2025  7:32 AM Inpatient Nephrology Consult Completed     2025  4:42 AM Inpatient Hospitalist Consult              Active and Resolved Hospital Problems:  Active Hospital Problems    Diagnosis POA    **Weakness [R53.1] Yes    Peripheral vascular disease of lower extremity [I73.9] Unknown      Resolved Hospital Problems   No resolved problems to display.       Hospital Course     Hospital Course:  Charlette Rai is a 89 y.o. female  past medical history of ESRD on HD MWF, PAD s/p lower extremity stenting, JONAH s/p stenting, hypothyroidism, COPD on 3 L nasal cannula, HFpEF, prior lung cancer status post chemotherapy and right upper lobectomy who presents to the ER due to new onset left lower extremity swelling with pain and weakness.      Patient admitted for evaluation of left lower extremity pain.  Given her history of previous vascular disease vascular surgery was consulted.  They rechecked ABIs and did decide to take her for angiogram, no acute issues were found at that time and no intervention was placed.  Lower extremity weakness and pain did improve during hospitalization.  Nephrology was consulted and she was continued on her routine dialysis MWF.  CT chest revealed new irregular 3.3 cm subpleural right lower lobe lesion.  Pulmonology was consulted, she went for IR guided biopsy on 2025, pathology results pending at time of discharge.  Hemoglobin did trend down and eventually stabilized around 7, did not require any PRBCs, iron panel obtained with high  ferritin, she is continued on EPO with dialysis.  PT/OT evaluated the patient and recommended inpatient rehab, this was discussed with the patient who declines rehab at this time and wants to go home.    Patient discharged in stable condition.  High risk for readmission given need for inpatient rehab and patient declining.    DISCHARGE Follow Up Recommendations for labs and diagnostics: Follow-up with PCP in 1 week.  Follow-up with nephrology as scheduled.  Follow-up with pulmonology in 2 weeks for final pathology results.      Day of Discharge     Vital Signs:  Temp:  [97.3 °F (36.3 °C)-97.9 °F (36.6 °C)] 97.5 °F (36.4 °C)  Heart Rate:  [] 74  Resp:  [12-22] 16  BP: (100-175)/() 119/41  Flow (L/min) (Oxygen Therapy):  [3-15] 3    Physical Exam:   Gen: NAD, Alert and Oriented  Cards: RRR, no murmur   Pulm: CTA b/l, no wheezing  Abd: soft, nondistended  Extremities: no pitting edema      Discharge Details        Discharge Medications        New Medications        Instructions Start Date   HYDROcodone-acetaminophen 5-325 MG per tablet  Commonly known as: NORCO   1 tablet, Oral, Every 4 Hours PRN             Changes to Medications        Instructions Start Date   ipratropium-albuterol 0.5-2.5 mg/3 ml nebulizer  Commonly known as: DUO-NEB  What changed: See the new instructions.   INHALE 1 vial (3 ml) per NEBULIZER FOUR TIMES DAILY FOR 30 DAYS      levocetirizine 5 MG tablet  Commonly known as: XYZAL  What changed: when to take this   TAKE 1 TABLET DAILY      vitamin D 1.25 MG (25454 UT) capsule capsule  Commonly known as: ERGOCALCIFEROL  What changed: additional instructions   50,000 Units, Oral, Every 14 Days             Continue These Medications        Instructions Start Date   acetaminophen 325 MG tablet  Commonly known as: TYLENOL   650 mg, Every 6 Hours PRN      albuterol sulfate  (90 Base) MCG/ACT inhaler  Commonly known as: PROVENTIL HFA;VENTOLIN HFA;PROAIR HFA   2 puffs, Inhalation, Every 4  Hours PRN      aspirin 81 MG EC tablet   81 mg, Daily      budesonide 0.5 MG/2ML nebulizer solution  Commonly known as: Pulmicort   0.5 mg, Nebulization, 2 Times Daily - RT      carvedilol 25 MG tablet  Commonly known as: COREG   25 mg, Oral, 2 Times Daily With Meals      clopidogrel 75 MG tablet  Commonly known as: Plavix   75 mg, Oral, Daily      [employer] DAILY FIBER PO   2 tablets, Daily      dexlansoprazole 60 MG capsule  Commonly known as: DEXILANT   60 mg, Oral, Daily      ferrous sulfate 325 (65 FE) MG tablet   325 mg, Daily With Breakfast      fluticasone 50 MCG/ACT nasal spray  Commonly known as: FLONASE   Administer 1 spray into the nostril(s) as directed by provider Daily.      furosemide 20 MG tablet  Commonly known as: LASIX   20 mg, Oral, Daily      isosorbide mononitrate 30 MG 24 hr tablet  Commonly known as: IMDUR   30 mg, Oral, Nightly      levothyroxine 50 MCG tablet  Commonly known as: SYNTHROID, LEVOTHROID   1 tablet, Oral, Daily      lidocaine-prilocaine 2.5-2.5 % cream  Commonly known as: EMLA   Apply 1 Application topically to the appropriate area as directed Take As Directed. Apply to dialysis access site 30-45 minutes prior to dialysis      rosuvastatin 20 MG tablet  Commonly known as: CRESTOR   20 mg, Oral, Nightly      sevelamer 800 MG tablet  Commonly known as: RENVELA   800 mg, Oral, 3 Times Daily With Meals      tobramycin  MG/5ML nebulizer solution  Commonly known as: JUAN   300 mg, Nebulization, 2 Times Daily - RT, 30 days on 30 days off             Stop These Medications      amLODIPine 2.5 MG tablet  Commonly known as: NORVASC     hydrALAZINE 25 MG tablet  Commonly known as: APRESOLINE     losartan 50 MG tablet  Commonly known as: Cozaar              No Known Allergies    Discharge Disposition:  Home or Self Care    Diet:  Hospital:  Diet Order   Procedures    Diet: Renal; Low Sodium (2-3g), Low Potassium, Low Phosphorus; Fluid Consistency: Thin (IDDSI 0)       Discharge Activity:        CODE STATUS:  Code Status and Medical Interventions: CPR (Attempt to Resuscitate); Full Support   Ordered at: 01/27/25 0546     Code Status (Patient has no pulse and is not breathing):    CPR (Attempt to Resuscitate)     Medical Interventions (Patient has pulse or is breathing):    Full Support         Future Appointments   Date Time Provider Department Center   2/4/2025 11:00 AM HEART FAIL CLIN [facility] None   3/18/2025 11:00 AM NURSE/MA ONC [place] [facility] ONC [facility]   3/18/2025 12:15 PM Brennan Mosqueda MD [facility] PC MEMCT [facility]   3/25/2025 11:15 AM Brennan Medrano MD [facility] ONC ET[facility]   4/1/2025 10:30 AM Jazzy Addison APRN [facility] CD [facility]   4/22/2025  1:45 PM Khalif Yanez MD [facility] PCC ETW [facility]       Additional Instructions for the Follow-ups that You Need to Schedule       Discharge Follow-up with PCP   As directed       Currently Documented PCP:    Brennan Mosqueda MD    PCP Phone Number:    [phone]     Follow Up Details: one week        Discharge Follow-up with Specified Provider: pulmonology; 2 Weeks   As directed      To: pulmonology   Follow Up: 2 Weeks                Pertinent  and/or Most Recent Results         LAB RESULTS:      Lab 01/31/25  0448 01/30/25  0620 01/29/25  0546 01/28/25  1315 01/28/25  0511 01/27/25  2203 01/27/25  1416 01/26/25  2204   WBC  --  4.28 4.09  --  4.64  --  5.44 3.90   HEMOGLOBIN 7.0* 7.1* 7.3*  --  8.1*  --  8.5* 7.5*   HEMATOCRIT 24.2* 24.4* 26.3*  --  25.9*  --  30.2* 26.5*   PLATELETS  --  98* 101*  --  138*  --  127* 116*   NEUTROS ABS  --  2.70 3.15  --  2.90  --  4.14 2.35   IMMATURE GRANS (ABS)  --  0.01  --   --  0.01  --  0.02 0.01   LYMPHS ABS  --  0.61*  --   --  0.75  --  0.49* 0.65*   MONOS ABS  --  0.80  --   --  0.82  --  0.63 0.72   EOS ABS  --  0.15 0.08  --  0.14  --  0.14 0.15   MCV  --  107.5* 112.4*  --  108.4*  --  109.4* 109.5*   PROTIME  --   --   --  15.3*  --   --  14.1 15.4*   APTT  --  35.5*  --  46.9* 151.7*  47.2* 29.9* 32.0         Lab 01/31/25 0448 01/30/25 0620 01/29/25  0546 01/28/25  0511 01/26/25 2204   SODIUM 135* 136 135* 139 145   POTASSIUM 4.0 4.0 4.3 4.2 5.2   CHLORIDE 102 103 104 106 105   CO2 23.2 25.8 16.2* 24.1 28.8   ANION GAP 9.8 7.2 14.8 8.9 11.2   BUN 30* 19 32* 18 34*   CREATININE 4.60* 3.18* 4.62* 3.47* 4.70*   EGFR 8.8* 13.6* 8.7* 12.3* 8.5*   GLUCOSE 99 97 80 102* 188*   CALCIUM 7.6* 7.6* 7.7* 8.0* 7.8*   MAGNESIUM  --  2.0 2.1 2.0  --    PHOSPHORUS 5.4* 4.2 5.3* 4.8*  --          Lab 01/31/25 0448 01/30/25 0620 01/29/25  0546 01/28/25 0511 01/26/25 2204   TOTAL PROTEIN  --   --   --   --  5.8*   ALBUMIN 2.6* 2.7* 2.4* 3.0* 3.3*   GLOBULIN  --   --   --   --  2.5   ALT (SGPT)  --   --   --   --  14   AST (SGOT)  --   --   --   --  25   BILIRUBIN  --   --   --   --  0.2   ALK PHOS  --   --   --   --  292*         Lab 01/28/25  1315 01/27/25  1416 01/26/25 2204   PROTIME 15.3* 14.1 15.4*   INR 1.18* 1.07 1.20*             Lab 01/28/25  0511 01/27/25  1416 01/26/25 2204   IRON  --   --  41   IRON SATURATION (TSAT)  --   --  17*   TIBC  --   --  235*   TRANSFERRIN  --   --  158*   FERRITIN  --   --  1,048.00*   ABO TYPING O   < >  --    RH TYPING Positive   < >  --    ANTIBODY SCREEN Negative  --   --     < > = values in this interval not displayed.         Brief Urine Lab Results  (Last result in the past 365 days)        Color   Clarity   Blood   Leuk Est   Nitrite   Protein   CREAT   Urine HCG        05/01/24 2102 Dark Yellow   Cloudy   Negative   Small (1+)   Negative   >=300 mg/dL (3+)                 Microbiology Results (last 10 days)       ** No results found for the last 240 hours. **            XR Chest 1 View    Result Date: 1/31/2025  Impression: 1.Haziness throughout the left hemithorax remains. There is volume loss left lung which has been noted.. A small left effusion not excluded. 2.Probable atelectatic changes right lung. 3.Scarring right upper lobe. Electronically Signed:  Tony Kurtz MD  1/31/2025 3:59 PM EST  Workstation ID: [id number]    XR Chest 1 View    Result Date: 1/31/2025  Impression: 1.No evidence of right-sided pneumothorax status post lung biopsy. 2.Again seen is opacity throughout the left lung presumably from pleural fluid, atelectasis and posttreatment changes. Electronically Signed: Reno Contreras MD  1/31/2025 11:56 AM EST  Workstation ID: [id number]    CT Needle Biopsy Lung    Result Date: 1/31/2025  Impression: 1.Successful CT-guided percutaneous core needle lung biopsy of right lower lobe lung mass without evidence of complication. Electronically Signed: Ildefonso Sanches MD  1/31/2025 11:20 AM EST  Workstation ID: [id number]    CT Chest Without Contrast Diagnostic    Result Date: 1/28/2025     1. There is a new irregular 3.3 cm subpleural opacity in the right lower lobe. It may be partially cavitary. A malignant process is possible. Consider biopsy for further evaluation. Nuclear Medicine (NM) whole-body FDG-PET-CT scan may be helpful in further evaluation, as well.  2. Please see above comments for further detail.    Portions of this note were completed with a voice recognition program.  Electronically Signed By-Chicho Jones MD On:1/28/2025 4:46 AM      CT Angio Abdominal Aorta Bilateral Iliofem Runoff    Result Date: 1/27/2025  Impression: 1. Chronic bilateral SFA occlusions stable since 2020. Bilateral distal at the level of the popliteal arteries through chronic developed collaterals, with three-vessel runoff to the level of the ankle bilaterally. 2. Severe intra-abdominal atherosclerotic disease including high-grade stenosis of proximal celiac artery, proximal SMA and proximal right renal artery. Mild ectatic dilation of the infrarenal abdominal aorta. All vascular stents in the abdomen are patent. No evidence of solid organ or hollow viscus ischemia. 3. Stable left common femoral artery pseudoaneurysm since 5/31/2024. 4. Masslike right lower lobe  consolidation which could reflect a focal pneumonia versus potential neoplasm in patient with prior history of lung malignancy. Consider pulmonary consultation for further management. 5. Findings suggesting volume overload/third spacing including small right pleural effusion, interstitial pulmonary edema and right lower lobe groundglass suspicious for early alveolar edema versus nonspecific infectious/inflammatory process. Body wall and bilateral lower extremity edema, left greater than right. 6. Cholelithiasis. 7. Numerous additional chronic/ancillary findings as above. Electronically Signed: Ildefonso Purcell MD  1/27/2025 1:11 PM EST  Workstation ID: [id number]    XR Hip With or Without Pelvis 2 - 3 View Left    Result Date: 1/27/2025  No acute fracture or acute malalignment is identified.   Portions of this note were completed with a voice recognition program.   Electronically Signed By-Chicho Jones MD On:1/27/2025 5:15 AM        Results for orders placed during the hospital encounter of 01/26/25    Doppler Ankle Brachial Index Single Level CAR    Interpretation Summary    Right Conclusion: The right GILLIAN is unable to be assessed due to vessel incompressibility. Moderate digital insufficiency.    Left Conclusion: The left GILLIAN is unable to be assessed due to vessel incompressibility. Moderate digital insufficiency.      Results for orders placed during the hospital encounter of 01/26/25    Doppler Ankle Brachial Index Single Level CAR    Interpretation Summary    Right Conclusion: The right GILLIAN is unable to be assessed due to vessel incompressibility. Moderate digital insufficiency.    Left Conclusion: The left GILLIAN is unable to be assessed due to vessel incompressibility. Moderate digital insufficiency.      Results for orders placed during the hospital encounter of 08/21/24    Adult Transthoracic Echo Limited W/ Cont if Necessary Per Protocol    Interpretation Summary    Left ventricular systolic function is low  normal. Left ventricular ejection fraction appears to be 51 - 55%.    Left ventricular wall thickness is consistent with mild concentric hypertrophy.    The left atrial cavity is mild to moderately dilated.    Aortic valve is moderately calcified.  Mild to moderate aortic valve stenosis is present.  The peak and mean gradient across aortic valve are 21/15 mmHg with a calculated valve area of 1.64 cm².    Mitral valve apparatus is calcified with restricted opening.  Possible mitral stenosis, unable to assess severity.  Adequate Doppler studies not obtained across the mitral valve.      Labs Pending at Discharge:  Pending Labs       Order Current Status    Tissue Pathology Exam In process              Time spent on Discharge including face to face service:  >30 minutes    Electronically signed by Dariana Valentin DO, 01/31/25, 4:10 PM EST.

## 2025-01-31 NOTE — PLAN OF CARE
Goal Outcome Evaluation:              Outcome Evaluation: Patient AOx4, patient is currently NPO for CT guided biopsy today, patient also has dialysis today

## 2025-01-31 NOTE — SIGNIFICANT NOTE
01/31/25 0710   OTHER   Discipline occupational therapist   Rehab Time/Intention   Session Not Performed patient unavailable for treatment  (with nurse)

## 2025-01-31 NOTE — NURSING NOTE
Arrived to [facility] holding via stretcher per transport for CT guided lung biopsy.  Pt alert, oriented.  Vitals stable.  Reports bilateral upper leg pain 6/10.

## 2025-01-31 NOTE — H&P
[facility]   Interventional Radiology H&P    Patient Name: Charlette Rai  : 1935  MRN: [id number]  Primary Care Physician:  Brennan Mosqueda MD  Referring Physician: No Known Provider  Date of admission: 2025    Subjective   Subjective     HPI:  Charlette Rai is a 89 y.o. female right lower lobe mass    Review of Systems:   Constitutional no fever,  no weight loss       Otolaryngeal no difficulty swallowing   Cardiovascular no chest pain   Pulmonary no cough, no sputum production   Gastrointestinal no constipation, no diarrhea                         Personal History       Past Medical/Surgical History:   Past Medical History:   Diagnosis Date    Allergic rhinitis     Anaphylactic shock, unspecified, initial encounter 2023    Anemia     Arthritis     Asthma     USE INHALERS AND NEBULIZERS    Back pain     Bladder disorder     Cancer     LEFT LUNG CANCER  SURGERY AND CHEMO DONE  AND CURRENTLY   RIGHT LUNG CANCER HAS ONLY RECEIVED RADIATION THUS FAR    Chronic kidney disease     stage 3    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 DENIES CP BUT DOES HAVE SOA CHRONIC ISSUE COPD/LUNG CANCER    COPD (chronic obstructive pulmonary disease)     FOLLOWED BY DR MARIAJOSE BAILEY    Coronary artery disease     DENIES CP BUT DOES GET SOA MOST OF THE TIME WITH EXERTION BUT OCC AT REST CHRONIC ISSUE COPD/LUNG CANCER    Deep vein thrombosis     Diabetes mellitus     DOES NOT CHECK BS DAILY    Disease of thyroid gland     HYPOTHYROIDISM    Essential hypertension     Gastric ulcer     GERD (gastroesophageal reflux disease)     Heart murmur     History of transfusion     NO ISSUES POST TRANSFUSION WAS MANY YEARS AGO    Hyperlipemia     Leukocytopenia     FOLLOWED BY DR MIR BAILEY    Limb swelling     Lumbago     Lumbar spinal  stenosis     Lung cancer     Migraine headache     Multiple joint pain     On home oxygen therapy     4L/NC PRN    Osteopenia     PNA (pneumonia) 05/04/2024    Pseudomonas pneumonia 04/29/2024    Reflux esophagitis     Shortness of breath     Thyroid nodule     HAS ULTRASOUND YEARLY BEING MONITORED    Vascular disease     Vitamin D deficiency      Past Surgical History:   Procedure Laterality Date    ABDOMINAL SURGERY      ANGIOPLASTY RENAL ARTERY Left 06/14/2024    stent placement    APPENDECTOMY      ARTERIOGRAM N/A 6/14/2024    Procedure: Arteriogram, right radial access, aortogram and renal arteriogram with possible intervention.;  Surgeon: Dio Miguel MD;  Location: [facility] CATH INVASIVE LOCATION;  Service: Peripheral Vascular;  Laterality: N/A;    ARTERIOVENOUS FISTULA/SHUNT SURGERY Left 05/10/2022    Procedure: Left basilic vein transposition;  Surgeon: Wan Barksdale MD;  Location: [facility] MAIN OR;  Service: Vascular;  Laterality: Left;    ARTERIOVENOUS FISTULA/SHUNT SURGERY Left 03/23/2023    Procedure: Ligation of left arm arteriovenous fistula;  Surgeon: Wan Barksdale MD;  Location: [facility] MAIN OR;  Service: Vascular;  Laterality: Left;    ARTERIOVENOUS FISTULA/SHUNT SURGERY Left 11/30/2023    Procedure: Creation of left arm arteriovenous graft;  Surgeon: Wan Barksdale MD;  Location: [facility] MAIN OR;  Service: Vascular;  Laterality: Left;    BRONCHOSCOPY N/A 04/24/2024    Procedure: BRONCHOSCOPY WITH BAL AND WASHINGS;  Surgeon: Khalif Yanez MD;  Location: [facility] ENDOSCOPY;  Service: Pulmonary;  Laterality: N/A;  MUCUS PLUGGING    CARDIAC CATHETERIZATION  1996    CARDIAC SURGERY      CARDIAC SURGERY      fluid drained from heart    CATARACT EXTRACTION, BILATERAL  2003    COLONOSCOPY  2014    ENDOSCOPY  2016    2019    FEMORAL ARTERY STENT Bilateral     HYSTERECTOMY      LUNG BIOPSY Left 2005    lobectomy upper lung caner    LUNG VOLUME REDUCTION      OTHER SURGICAL HISTORY      artifical joints/limbs     REPLACEMENT TOTAL KNEE Left 2016    SHUNT O GRAM N/A 1/13/2025    Procedure: dialysis shuntogram;  Surgeon: Court Valente MD;  Location: [facility] INVASIVE LOCATION;  Service: Peripheral Vascular;  Laterality: N/A;    UPPER GASTROINTESTINAL ENDOSCOPY         Social History:  reports that she quit smoking about 20 years ago. Her smoking use included cigarettes. She started smoking about 73 years ago. She has a 52.5 pack-year smoking history. She has been exposed to tobacco smoke. She has never used smokeless tobacco. She reports that she does not currently use alcohol. She reports that she does not use drugs.    Medications:  Medications Prior to Admission   Medication Sig Dispense Refill Last Dose/Taking    acetaminophen (TYLENOL) 325 MG tablet Take 2 tablets by mouth Every 6 (Six) Hours As Needed for Mild Pain.   Taking As Needed    albuterol sulfate  (90 Base) MCG/ACT inhaler Inhale 2 puffs Every 4 (Four) Hours As Needed for Wheezing. 18 g 11 Taking As Needed    amLODIPine (NORVASC) 2.5 MG tablet Take 1 tablet by mouth As Needed (Once daily for a systolic blood pressure greater than 150). For a systolic blood pressure greater than 150 90 tablet 3 Taking As Needed    aspirin 81 MG EC tablet Take 1 tablet by mouth Daily.   1/26/2025 Morning    budesonide (Pulmicort) 0.5 MG/2ML nebulizer solution Take 2 mL by nebulization 2 (Two) Times a Day. 60 each 5 Taking    carvedilol (COREG) 25 MG tablet Take 1 tablet by mouth 2 (Two) Times a Day With Meals. 180 tablet 3 1/26/2025    clopidogrel (Plavix) 75 MG tablet Take 1 tablet by mouth Daily. 90 tablet 3 1/26/2025    dexlansoprazole (DEXILANT) 60 MG capsule Take 1 capsule by mouth Daily. 90 capsule 3 1/26/2025    ferrous sulfate 325 (65 FE) MG tablet Take 1 tablet by mouth Daily With Breakfast.   1/26/2025    fluticasone (FLONASE) 50 MCG/ACT nasal spray Administer 1 spray into the nostril(s) as directed by provider Daily.   Taking    furosemide (LASIX) 20 MG  tablet Take 1 tablet by mouth Daily. 90 tablet 3 1/26/2025    hydrALAZINE (APRESOLINE) 25 MG tablet Take 1 tablet by mouth 2 (Two) Times a Day. (Patient taking differently: Take 1 tablet by mouth Every Night.) 180 tablet 3 1/26/2025    ipratropium-albuterol (DUO-NEB) 0.5-2.5 mg/3 ml nebulizer INHALE 1 vial (3 ml) per NEBULIZER FOUR TIMES DAILY FOR 30 DAYS (Patient taking differently: Take 3 mL by nebulization 4 (Four) Times a Day.) 360 mL 2 1/26/2025    isosorbide mononitrate (IMDUR) 30 MG 24 hr tablet Take 1 tablet by mouth Every Night. 90 tablet 3 1/26/2025    levocetirizine (XYZAL) 5 MG tablet TAKE 1 TABLET DAILY (Patient taking differently: Take 1 tablet by mouth Every Evening.) 90 tablet 1 1/26/2025    levothyroxine (SYNTHROID, LEVOTHROID) 50 MCG tablet Take 1 tablet by mouth Daily.   1/26/2025    lidocaine-prilocaine (EMLA) 2.5-2.5 % cream Apply 1 Application topically to the appropriate area as directed Take As Directed. Apply to dialysis access site 30-45 minutes prior to dialysis   Taking    losartan (Cozaar) 50 MG tablet Take 1 tablet by mouth 2 (Two) Times a Day. 180 tablet 3 1/26/2025    Psyllium ([employer] DAILY FIBER PO) Take 2 tablets by mouth Daily.   1/26/2025    rosuvastatin (CRESTOR) 20 MG tablet Take 1 tablet by mouth Every Night. 90 tablet 3 1/26/2025    sevelamer (RENVELA) 800 MG tablet Take 1 tablet by mouth 3 (Three) Times a Day With Meals. 270 tablet 3 1/26/2025    tobramycin PF (JUAN) 300 MG/5ML nebulizer solution Take 5 mL by nebulization 2 (Two) Times a Day. 30 days on 30 days off 300 mL 6 1/26/2025    vitamin D (ERGOCALCIFEROL) 1.25 MG (11730 UT) capsule capsule Take 1 capsule by mouth Every 14 (Fourteen) Days. (Patient taking differently: Take 1 capsule by mouth Every 14 (Fourteen) Days. The 15th and 30th of the month) 6 capsule 3 1/15/2025     Current medications:  arformoterol, 15 mcg, Nebulization, BID - RT  aspirin, 81 mg, Oral, Daily  budesonide, 0.5 mg, Nebulization, BID -  "RT  carvedilol, 25 mg, Oral, BID With Meals  fluticasone, 1 spray, Nasal, Daily  ipratropium-albuterol, 3 mL, Nebulization, Q4H - RT  isosorbide mononitrate, 30 mg, Oral, Nightly  levothyroxine, 50 mcg, Oral, Q AM  rosuvastatin, 20 mg, Oral, Nightly  sevelamer, 800 mg, Oral, TID With Meals  sodium chloride, 10 mL, Intravenous, Q12H  tobramycin PF, 300 mg, Nebulization, BID - RT      Current IV drips:       Allergies:  No Known Allergies    Objective    Objective     Vitals:   Temp:  [97.3 °F (36.3 °C)-97.9 °F (36.6 °C)] 97.9 °F (36.6 °C)  Heart Rate:  [56-82] 75  Resp:  [16-18] 16  BP: (126-169)/(32-52) 169/41  Flow (L/min) (Oxygen Therapy):  [3-4] 3      Physical Exam:   Constitutional: Awake, alert, No acute distress    Respiratory: Clear to auscultation bilaterally, nonlabored respirations    Cardiovascular: RRR, murmur,   Gastrointestinal: Positive bowel sounds, soft, nontender, nondistended        ASA SCALE ASSESSMENT:  3-Severe systemic disease that results in functional limitation    MALLAMPATI CLASSIFICATION:  3-Only the soft palate and base of uvula are visible       Result Review        Result Review:     Sodium   Date Value Ref Range Status   01/31/2025 135 (L) 136 - 145 mmol/L Final   01/30/2025 136 136 - 145 mmol/L Final   01/29/2025 135 (L) 136 - 145 mmol/L Final       Potassium   Date Value Ref Range Status   01/31/2025 4.0 3.5 - 5.2 mmol/L Final   01/30/2025 4.0 3.5 - 5.2 mmol/L Final   01/29/2025 4.3 3.5 - 5.2 mmol/L Final     Comment:     Slight hemolysis detected by analyzer. Result may be falsely elevated.       Chloride   Date Value Ref Range Status   01/31/2025 102 98 - 107 mmol/L Final   01/30/2025 103 98 - 107 mmol/L Final   01/29/2025 104 98 - 107 mmol/L Final       No results found for: \"PLASMABICARB\"    BUN   Date Value Ref Range Status   01/31/2025 30 (H) 8 - 23 mg/dL Final   01/30/2025 19 8 - 23 mg/dL Final   01/29/2025 32 (H) 8 - 23 mg/dL Final       Creatinine   Date Value Ref Range " "Status   01/31/2025 4.60 (H) 0.57 - 1.00 mg/dL Final   01/30/2025 3.18 (H) 0.57 - 1.00 mg/dL Final   01/29/2025 4.62 (H) 0.57 - 1.00 mg/dL Final       Calcium   Date Value Ref Range Status   01/31/2025 7.6 (L) 8.6 - 10.5 mg/dL Final   01/30/2025 7.6 (L) 8.6 - 10.5 mg/dL Final   01/29/2025 7.7 (L) 8.6 - 10.5 mg/dL Final           No components found for: \"GLUCOSE.*\"  Results from last 7 days   Lab Units 01/30/25  0620   WBC 10*3/mm3 4.28   HEMOGLOBIN g/dL 7.1*   HEMATOCRIT % 24.4*   PLATELETS 10*3/mm3 98*      Results from last 7 days   Lab Units 01/28/25  1315   INR  1.18*           Assessment / Plan     Assesment:   Right lower lobe mass      Plan:   Ct guided biopsy    The risks and benefits of the procedure were discussed with the patient.    Electronically signed by Ildefonso Sanches MD, 01/31/25, 9:53 AM EST.   "

## 2025-01-31 NOTE — THERAPY EVALUATION
Acute Care - Physical Therapy Initial Evaluation  [facility]     Patient Name: Charlette Rai  : 1935  MRN: [id number]  Today's Date: 2025      Visit Dx:     ICD-10-CM ICD-9-CM   1. Edema of left lower extremity  R60.0 782.3   2. ESRD (end stage renal disease)  N18.6 585.6   3. Acute leg pain, left  M79.605 729.5   4. Unable to ambulate  R26.2 719.7   5. Peripheral vascular disease of lower extremity  I73.9 443.9   6. Impaired mobility and ADLs  Z74.09 V49.89    Z78.9    7. Difficulty walking  R26.2 719.7     Patient Active Problem List   Diagnosis    Malignant neoplasm of upper lobe of right lung    Rheumatoid arthritis    Asthma    Chronic heart failure with preserved ejection fraction    Essential hypertension    GERD (gastroesophageal reflux disease)    Hyperlipidemia LDL goal <70    Lumbar spinal stenosis    Migraine    Monoclonal paraproteinemia    Osteopenia    Oxygen dependent    Peripheral neuropathy    Stage 4 chronic kidney disease    Vitamin D deficiency    Carotid artery stenosis    Chronic right-sided thoracic back pain    Peripheral vascular disease of lower extremity    Ex-smoker    De Quervain's tenosynovitis    Arthritis of carpometacarpal (CMC) joint of right thumb    Steal syndrome of dialysis vascular access    Anemia of chronic renal failure, stage 4 (severe)    Aortic stenosis, moderate    Hematoma    End stage renal disease on dialysis    Coronary artery calcification seen on CT scan    Frailty syndrome in geriatric patient    COPD with lower respiratory infection    Coagulation defect, unspecified    Hyperphosphatemia    Hypothyroidism (acquired)    Idiopathic osteoarthritis    Secondary multiple arthritis    Type 2 diabetes mellitus with diabetic peripheral angiopathy without gangrene    Acute on chronic respiratory failure with hypoxia and hypercapnia    Elevated troponin level not due myocardial infarction    Renal artery stenosis    S/P renal artery angioplasty     Patient coming in for annual wanted labs prior to, unable to sign these labs   Atherosclerosis of renal artery    Abnormal serum immunoelectrophoresis    ESRD (end stage renal disease) on dialysis    Hypoxia    NSVT (nonsustained ventricular tachycardia)    Hypertensive heart and chronic kidney disease with heart failure and with stage 5 chronic kidney disease, or end stage renal disease    Unspecified protein-calorie malnutrition    IFG (impaired fasting glucose)    Iron deficiency anemia    AV graft malfunction, initial encounter    AV graft malfunction    Weakness     Past Medical History:   Diagnosis Date    Allergic rhinitis     Anaphylactic shock, unspecified, initial encounter 12/05/2023    Anemia     Arthritis     Asthma     USE INHALERS AND NEBULIZERS    Back pain     Bladder disorder     Cancer     LEFT LUNG CANCER 2005 SURGERY AND CHEMO DONE  AND CURRENTLY 4/ 14/22  RIGHT LUNG CANCER HAS ONLY RECEIVED RADIATION THUS FAR    Chronic kidney disease     stage 3    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 DENIES CP BUT DOES HAVE SOA CHRONIC ISSUE COPD/LUNG CANCER    COPD (chronic obstructive pulmonary disease)     FOLLOWED BY DR MARIAJOSE BAILEY    Coronary artery disease     DENIES CP BUT DOES GET SOA MOST OF THE TIME WITH EXERTION BUT OCC AT REST CHRONIC ISSUE COPD/LUNG CANCER    Deep vein thrombosis     Diabetes mellitus     DOES NOT CHECK BS DAILY    Disease of thyroid gland     HYPOTHYROIDISM    Essential hypertension     Gastric ulcer     GERD (gastroesophageal reflux disease)     Heart murmur     History of transfusion     NO ISSUES POST TRANSFUSION WAS MANY YEARS AGO    Hyperlipemia     Leukocytopenia     FOLLOWED BY DR MIR BAILEY    Limb swelling     Lumbago     Lumbar spinal stenosis     Lung cancer     Migraine headache     Multiple joint pain     On home oxygen therapy     4L/NC PRN    Osteopenia     PNA (pneumonia)  05/04/2024    Pseudomonas pneumonia 04/29/2024    Reflux esophagitis     Shortness of breath     Thyroid nodule     HAS ULTRASOUND YEARLY BEING MONITORED    Vascular disease     Vitamin D deficiency      Past Surgical History:   Procedure Laterality Date    ABDOMINAL SURGERY      ANGIOPLASTY RENAL ARTERY Left 06/14/2024    stent placement    APPENDECTOMY      ARTERIOGRAM N/A 6/14/2024    Procedure: Arteriogram, right radial access, aortogram and renal arteriogram with possible intervention.;  Surgeon: Dio Miguel MD;  Location: [facility] CATH INVASIVE LOCATION;  Service: Peripheral Vascular;  Laterality: N/A;    ARTERIOVENOUS FISTULA/SHUNT SURGERY Left 05/10/2022    Procedure: Left basilic vein transposition;  Surgeon: Wan Barksdale MD;  Location: [facility] MAIN OR;  Service: Vascular;  Laterality: Left;    ARTERIOVENOUS FISTULA/SHUNT SURGERY Left 03/23/2023    Procedure: Ligation of left arm arteriovenous fistula;  Surgeon: Wan Barksdale MD;  Location: [facility] MAIN OR;  Service: Vascular;  Laterality: Left;    ARTERIOVENOUS FISTULA/SHUNT SURGERY Left 11/30/2023    Procedure: Creation of left arm arteriovenous graft;  Surgeon: Wan Barksdale MD;  Location: [facility] MAIN OR;  Service: Vascular;  Laterality: Left;    BRONCHOSCOPY N/A 04/24/2024    Procedure: BRONCHOSCOPY WITH BAL AND WASHINGS;  Surgeon: Khalif Yanez MD;  Location: [facility] ENDOSCOPY;  Service: Pulmonary;  Laterality: N/A;  MUCUS PLUGGING    CARDIAC CATHETERIZATION  1996    CARDIAC SURGERY      CARDIAC SURGERY      fluid drained from heart    CATARACT EXTRACTION, BILATERAL  2003    COLONOSCOPY  2014    ENDOSCOPY  2016    2019    FEMORAL ARTERY STENT Bilateral     HYSTERECTOMY      LUNG BIOPSY Left 2005    lobectomy upper lung caner    LUNG VOLUME REDUCTION      OTHER SURGICAL HISTORY      artifical joints/limbs    REPLACEMENT TOTAL KNEE Left 2016    SHUNT O GRAM N/A 1/13/2025    Procedure: dialysis shuntogram;  Surgeon: Court Valente MD;  Location:   [facility] CATH INVASIVE LOCATION;  Service: Peripheral Vascular;  Laterality: N/A;    UPPER GASTROINTESTINAL ENDOSCOPY       PT Assessment (Last 12 Hours)       PT Evaluation and Treatment       Row Name 01/31/25 1500          Physical Therapy Time and Intention    Subjective Information complains of;fatigue  -CS     Document Type evaluation  -CS     Mode of Treatment individual therapy;physical therapy  -CS     Patient Effort good  -CS     Symptoms Noted During/After Treatment fatigue;increased pain  -CS       Row Name 01/31/25 1500          General Information    Patient Profile Reviewed yes  -CS     Patient Observations alert;cooperative;agree to therapy  -CS     Prior Level of Function independent:;all household mobility;gait;transfer;bed mobility;ADL's  -CS     Equipment Currently Used at Home oxygen;walker, rolling  Pt reports she uses her rolling walker for ambulation and has a wheelchair for outside the home with longer distances. She wears 3-4L O2. She has a step-over tub with seat and grab bars.  -CS     Existing Precautions/Restrictions fall;oxygen therapy device and L/min  3.5L  -CS     Limitations/Impairments hearing  -CS     Barriers to Rehab none identified  -CS       Row Name 01/31/25 1500          Living Environment    Current Living Arrangements home  -CS     Home Accessibility stairs to enter home;stairs within home  -CS     People in Home spouse  -CS     Primary Care Provided by self  -CS       Row Name 01/31/25 1500          Home Main Entrance    Number of Stairs, Main Entrance three  -CS     Stair Railings, Main Entrance railings safe and in good condition  -CS       Row Name 01/31/25 1500          Stairs Within Home, Primary    Number of Stairs, Within Home, Primary none  -CS       Row Name 01/31/25 1500          Pain    Pretreatment Pain Rating 3/10  -CS     Posttreatment Pain Rating 4/10  -CS     Pain Location groin;extremity  -CS     Pain Side/Orientation left;upper  -CS       Row Name 01/31/25  1500          Cognition    Orientation Status (Cognition) oriented x 3  -       Row Name 01/31/25 1500          Range of Motion Comprehensive    General Range of Motion bilateral lower extremity ROM WFL  -       Row Name 01/31/25 1500          Strength Comprehensive (MMT)    General Manual Muscle Testing (MMT) Assessment lower extremity strength deficits identified  -     Comment, General Manual Muscle Testing (MMT) Assessment LLE assessed at 3-/5 and RLE assessed at 4-/5  -       Row Name 01/31/25 1500          Bed Mobility    Bed Mobility bed mobility (all) activities  -     All Activities, [place] (Bed Mobility) verbal cues;minimum assist (75% patient effort);moderate assist (50% patient effort)  -     Bed Mobility, Safety Issues decreased use of legs for bridging/pushing;decreased use of arms for pushing/pulling  -     Assistive Device (Bed Mobility) bed rails;head of bed elevated  -[facility] Name 01/31/25 1500          Transfers    Transfers sit-stand transfer;stand-sit transfer;toilet transfer  -[facility] Name 01/31/25 1500          Sit-Stand Transfer    Sit-Stand Chaves (Transfers) verbal cues;minimum assist (75% patient effort)  -     Assistive Device (Sit-Stand Transfers) walker, front-wheeled  -       Row Name 01/31/25 1500          Stand-Sit Transfer    Stand-Sit Chaves (Transfers) verbal cues;minimum assist (75% patient effort)  -     Assistive Device (Stand-Sit Transfers) walker, front-wheeled  -[facility] Name 01/31/25 1500          Toilet Transfer    Type (Toilet Transfer) sit-stand;stand-sit  -     [place] Level (Toilet Transfer) verbal cues;contact guard;minimum assist (75% patient effort)  -     Assistive Device (Toilet Transfer) walker, front-wheeled;commode, bedside without drop arms  -       Row Name 01/31/25 1500          Gait/Stairs (Locomotion)    Gait/Stairs Locomotion gait/ambulation assistive device  -     [place] Level (Gait)  verbal cues;contact guard;minimum assist (75% patient effort)  -CS     Assistive Device (Gait) walker, front-wheeled  -CS     Patient was able to Ambulate yes  -CS     Distance in Feet (Gait) 10  -CS     Pattern (Gait) step-through  -CS     Deviations/Abnormal Patterns (Gait) gait speed decreased;stride length decreased  -CS     Bilateral Gait Deviations forward flexed posture  -CS       Row Name 01/31/25 1500          Safety Issues/Impairments Affecting Functional Mobility    Impairments Affecting Function (Mobility) balance;endurance/activity tolerance;shortness of breath;strength  -CS       Row Name 01/31/25 1500          Balance    Balance Assessment standing dynamic balance  -CS     Dynamic Standing Balance verbal cues;minimal assist  -CS     Position/Device Used, Standing Balance walker, front-wheeled  -CS       Row Name             Wound 01/29/25 0835 Left anterior groin    Wound - Properties Group Placement Date: 01/29/25  -AG Placement Time: 0835  -AG Side: Left  -AG Orientation: anterior  -AG Location: groin  -AG Primary Wound Type: Puncture  -AG    Retired Wound - Properties Group Placement Date: 01/29/25  -AG Placement Time: 0835  -AG Side: Left  -AG Orientation: anterior  -AG Location: groin  -AG Primary Wound Type: Puncture  -AG    Retired Wound - Properties Group Placement Date: 01/29/25  -AG Placement Time: 0835  -AG Side: Left  -AG Orientation: anterior  -AG Location: groin  -AG Primary Wound Type: Puncture  -AG    Retired Wound - Properties Group Date first assessed: 01/29/25  -AG Time first assessed: 0835  -AG Side: Left  -AG Location: groin  -AG Primary Wound Type: Puncture  -AG      Row Name             Wound 01/31/25 1032 Right posterior back    Wound - Properties Group Placement Date: 01/31/25  -TE Placement Time: 1032  -TE Side: Right  -TE Orientation: posterior  -TE Location: back  -TE Primary Wound Type: Puncture  -TE Additional Comments: right lower lobe lung biopsy site per Dr Sanches   -MARCELA    Retired Wound - Properties Group Placement Date: 01/31/25  -TE Placement Time: 1032  -TE Side: Right  -TE Orientation: posterior  -TE Location: back  -TE Primary Wound Type: Puncture  -TE Additional Comments: right lower lobe lung biopsy site per Dr Myron FAULKNER    Retired Wound - Properties Group Placement Date: 01/31/25  -TE Placement Time: 1032  -TE Side: Right  -TE Orientation: posterior  -TE Location: back  -TE Primary Wound Type: Puncture  -TE Additional Comments: right lower lobe lung biopsy site per Dr Myron FAULKNER    Retired Wound - Properties Group Date first assessed: 01/31/25  -TE Time first assessed: 1032  -TE Side: Right  -TE Location: back  -TE Primary Wound Type: Puncture  -TE Additional Comments: right lower lobe lung biopsy site per Dr yMron FAULKNER      Row Name 01/31/25 1500          Plan of Care Review    Plan of Care Reviewed With patient  -CS     Progress no change  -CS     Outcome Evaluation Pt presents with limitations that impede their ability to safely and independently transfer and ambulate. The skills of a therapist will be required to safely and effectively implement the following treatment plan to restore maximal level of function.  -CS       Row Name 01/31/25 1500          Positioning and Restraints    Pre-Treatment Position in bed  -CS     Post Treatment Position chair  -CS     In Chair reclined;call light within reach;encouraged to call for assist  -CS       Row Name 01/31/25 1500          Therapy Assessment/Plan (PT)    Rehab Potential (PT) good  -CS     Criteria for Skilled Interventions Met (PT) yes;skilled treatment is necessary  -CS     Therapy Frequency (PT) daily  -CS     Predicted Duration of Therapy Intervention (PT) 10 days  -CS     Problem List (PT) problems related to;balance;mobility;strength;pain  -CS     Activity Limitations Related to Problem List (PT) unable to ambulate safely;unable to transfer safely  -CS       Row Name 01/31/25 1500          PT  Evaluation Complexity    History, PT Evaluation Complexity 1-2 personal factors and/or comorbidities  -CS     Examination of Body Systems (PT Eval Complexity) total of 4 or more elements  -CS     Clinical Presentation (PT Evaluation Complexity) stable  -CS     Clinical Decision Making (PT Evaluation Complexity) low complexity  -CS     Overall Complexity (PT Evaluation Complexity) low complexity  -CS       Row Name 01/31/25 1500          Therapy Plan Review/Discharge Plan (PT)    Therapy Plan Review (PT) evaluation/treatment results reviewed;patient  -CS       Row Name 01/31/25 1500          Physical Therapy Goals    Bed Mobility Goal Selection (PT) bed mobility, PT goal 1  -CS     Transfer Goal Selection (PT) transfer, PT goal 1  -CS     Gait Training Goal Selection (PT) gait training, PT goal 1  -CS       Row Name 01/31/25 1500          Bed Mobility Goal 1 (PT)    Activity/Assistive Device (Bed Mobility Goal 1, PT) bed mobility activities, all  -CS     [place] Level/Cues Needed (Bed Mobility Goal 1, PT) modified independence  -CS     Time Frame (Bed Mobility Goal 1, PT) long term goal (LTG);10 days  -CS       Row Name 01/31/25 1500          Transfer Goal 1 (PT)    Activity/Assistive Device (Transfer Goal 1, PT) sit-to-stand/stand-to-sit;bed-to-chair/chair-to-bed;walker, rolling  -CS     [place] Level/Cues Needed (Transfer Goal 1, PT) modified independence  -CS     Time Frame (Transfer Goal 1, PT) long term goal (LTG);10 days  -CS       Row Name 01/31/25 1500          Gait Training Goal 1 (PT)    Activity/Assistive Device (Gait Training Goal 1, PT) gait (walking locomotion);assistive device use;walker, rolling  -CS     [place] Level (Gait Training Goal 1, PT) supervision required  -CS     Distance (Gait Training Goal 1, PT) 125  -CS     Time Frame (Gait Training Goal 1, PT) long term goal (LTG);10 days  -CS               User Key  (r) = Recorded By, (t) = Taken By, (c) = Cosigned By      Initials Name  Provider Type    Marie Beebe, RN Registered Nurse    Dina Enriquez RN Registered Nurse    Porsche Majano PT Physical Therapist                    Physical Therapy Education        No education to display                  PT Recommendation and Plan  Anticipated Discharge Disposition (PT): inpatient rehabilitation facility, home with home health, home with assist  Planned Therapy Interventions (PT): balance training, bed mobility training, gait training, transfer training, strengthening  Therapy Frequency (PT): daily  Plan of Care Reviewed With: patient  Progress: no change  Outcome Evaluation: Pt presents with limitations that impede their ability to safely and independently transfer and ambulate. The skills of a therapist will be required to safely and effectively implement the following treatment plan to restore maximal level of function.   Outcome Measures       Row Name 01/31/25 1500             How much help from another person do you currently need...    Turning from your back to your side while in flat bed without using bedrails? 2  -CS      Moving from lying on back to sitting on the side of a flat bed without bedrails? 2  -CS      Moving to and from a bed to a chair (including a wheelchair)? 3  -CS      Standing up from a chair using your arms (e.g., wheelchair, bedside chair)? 3  -CS      Climbing 3-5 steps with a railing? 2  -CS      To walk in hospital room? 3  -CS      AM-PAC 6 Clicks Score (PT) 15  -CS         Functional Assessment    Outcome Measure Options AM-PAC 6 Clicks Basic Mobility (PT)  -CS                User Key  (r) = Recorded By, (t) = Taken By, (c) = Cosigned By      Initials Name Provider Type    Porsche Majano PT Physical Therapist                     Time Calculation:    PT Charges       Row Name 01/31/25 1510             Time Calculation    PT Received On 01/31/25  -      PT Goal Re-Cert Due Date 02/09/25  -         Untimed Charges    PT Eval/Re-eval Minutes 46   -CS         Total Minutes    Untimed Charges Total Minutes 46  -CS       Total Minutes 46  -CS                User Key  (r) = Recorded By, (t) = Taken By, (c) = Cosigned By      Initials Name Provider Type    CS Porsche Avery, RENAN Physical Therapist                  Therapy Charges for Today       Code Description Service Date Service Provider Modifiers Qty    20700008694 HC PT EVAL LOW COMPLEXITY 4 1/31/2025 Porsche Avery PT GP 1            PT G-Codes  Outcome Measure Options: AM-PAC 6 Clicks Basic Mobility (PT)  AM-PAC 6 Clicks Score (PT): 15  AM-PAC 6 Clicks Score (OT): 21    Porsche Avery PT  1/31/2025

## 2025-02-01 ENCOUNTER — READMISSION MANAGEMENT (OUTPATIENT)
Dept: CALL CENTER | Facility: HOSPITAL | Age: 89
End: 2025-02-01
Payer: MEDICARE

## 2025-02-01 VITALS
HEART RATE: 74 BPM | SYSTOLIC BLOOD PRESSURE: 130 MMHG | OXYGEN SATURATION: 99 % | HEIGHT: 65 IN | WEIGHT: 142.2 LBS | TEMPERATURE: 97.9 F | DIASTOLIC BLOOD PRESSURE: 40 MMHG | RESPIRATION RATE: 20 BRPM | BODY MASS INDEX: 23.69 KG/M2

## 2025-02-01 LAB
ALBUMIN SERPL-MCNC: 2.7 G/DL (ref 3.5–5.2)
ANION GAP SERPL CALCULATED.3IONS-SCNC: 8.7 MMOL/L (ref 5–15)
BUN SERPL-MCNC: 22 MG/DL (ref 8–23)
BUN/CREAT SERPL: 6.6 (ref 7–25)
CALCIUM SPEC-SCNC: 8 MG/DL (ref 8.6–10.5)
CHLORIDE SERPL-SCNC: 103 MMOL/L (ref 98–107)
CO2 SERPL-SCNC: 23.3 MMOL/L (ref 22–29)
CREAT SERPL-MCNC: 3.31 MG/DL (ref 0.57–1)
DEPRECATED RDW RBC AUTO: 59.5 FL (ref 37–54)
EGFRCR SERPLBLD CKD-EPI 2021: 13 ML/MIN/1.73
ERYTHROCYTE [DISTWIDTH] IN BLOOD BY AUTOMATED COUNT: 15.3 % (ref 12.3–15.4)
GLUCOSE SERPL-MCNC: 87 MG/DL (ref 65–99)
HCT VFR BLD AUTO: 24.8 % (ref 34–46.6)
HGB BLD-MCNC: 7.1 G/DL (ref 12–15.9)
MCH RBC QN AUTO: 30.7 PG (ref 26.6–33)
MCHC RBC AUTO-ENTMCNC: 28.6 G/DL (ref 31.5–35.7)
MCV RBC AUTO: 107.4 FL (ref 79–97)
PHOSPHATE SERPL-MCNC: 3.9 MG/DL (ref 2.5–4.5)
PLATELET # BLD AUTO: 112 10*3/MM3 (ref 140–450)
PMV BLD AUTO: 10.5 FL (ref 6–12)
POTASSIUM SERPL-SCNC: 4.1 MMOL/L (ref 3.5–5.2)
RBC # BLD AUTO: 2.31 10*6/MM3 (ref 3.77–5.28)
SODIUM SERPL-SCNC: 135 MMOL/L (ref 136–145)
WBC NRBC COR # BLD AUTO: 4.04 10*3/MM3 (ref 3.4–10.8)

## 2025-02-01 PROCEDURE — 99232 SBSQ HOSP IP/OBS MODERATE 35: CPT | Performed by: STUDENT IN AN ORGANIZED HEALTH CARE EDUCATION/TRAINING PROGRAM

## 2025-02-01 PROCEDURE — 94799 UNLISTED PULMONARY SVC/PX: CPT

## 2025-02-01 PROCEDURE — 99232 SBSQ HOSP IP/OBS MODERATE 35: CPT | Performed by: INTERNAL MEDICINE

## 2025-02-01 PROCEDURE — 94761 N-INVAS EAR/PLS OXIMETRY MLT: CPT

## 2025-02-01 PROCEDURE — 85027 COMPLETE CBC AUTOMATED: CPT | Performed by: INTERNAL MEDICINE

## 2025-02-01 PROCEDURE — 80069 RENAL FUNCTION PANEL: CPT | Performed by: SURGERY

## 2025-02-01 PROCEDURE — 94664 DEMO&/EVAL PT USE INHALER: CPT

## 2025-02-01 RX ORDER — HYDROCODONE BITARTRATE AND ACETAMINOPHEN 5; 325 MG/1; MG/1
1 TABLET ORAL EVERY 4 HOURS PRN
Qty: 18 TABLET | Refills: 0 | Status: SHIPPED | OUTPATIENT
Start: 2025-02-01 | End: 2025-02-04

## 2025-02-01 RX ADMIN — FLUTICASONE PROPIONATE 1 SPRAY: 50 SPRAY, METERED NASAL at 10:07

## 2025-02-01 RX ADMIN — IPRATROPIUM BROMIDE AND ALBUTEROL SULFATE 3 ML: .5; 3 SOLUTION RESPIRATORY (INHALATION) at 11:20

## 2025-02-01 RX ADMIN — IPRATROPIUM BROMIDE AND ALBUTEROL SULFATE 3 ML: .5; 3 SOLUTION RESPIRATORY (INHALATION) at 06:39

## 2025-02-01 RX ADMIN — LEVOTHYROXINE SODIUM 50 MCG: 50 TABLET ORAL at 05:30

## 2025-02-01 RX ADMIN — HYDROCODONE BITARTRATE AND ACETAMINOPHEN 1 TABLET: 5; 325 TABLET ORAL at 05:30

## 2025-02-01 RX ADMIN — ARFORMOTEROL TARTRATE 15 MCG: 15 SOLUTION RESPIRATORY (INHALATION) at 06:39

## 2025-02-01 RX ADMIN — Medication 10 ML: at 10:06

## 2025-02-01 RX ADMIN — TOBRAMYCIN 300 MG: 300 SOLUTION RESPIRATORY (INHALATION) at 06:39

## 2025-02-01 RX ADMIN — HYDROCODONE BITARTRATE AND ACETAMINOPHEN 1 TABLET: 5; 325 TABLET ORAL at 10:10

## 2025-02-01 RX ADMIN — SEVELAMER CARBONATE 800 MG: 800 TABLET, FILM COATED ORAL at 12:29

## 2025-02-01 RX ADMIN — SEVELAMER CARBONATE 800 MG: 800 TABLET, FILM COATED ORAL at 10:05

## 2025-02-01 RX ADMIN — BUDESONIDE 0.5 MG: 0.5 INHALANT ORAL at 06:39

## 2025-02-01 RX ADMIN — ASPIRIN 81 MG CHEWABLE TABLET 81 MG: 81 TABLET CHEWABLE at 10:05

## 2025-02-01 NOTE — OUTREACH NOTE
Prep Survey      Flowsheet Row Responses   [facility]? [facility]   Is LACE score < 7 ? No   Eligibility [facility]   Date of Admission 01/26/25   Date of Discharge 02/01/25   Discharge Disposition Home-Health Care Sv   Discharge diagnosis Weakness   Does the patient have one of the following disease processes/diagnoses(primary or secondary)? Other   Does the patient have Home health ordered? Yes   What is the Home health agency?  [facility]    Is there a DME ordered? No   Comments regarding appointments Discharge Follow-up with Specified Provider: pulmonology,  2 Weeks   Medication alerts for this patient see AVS   Prep survey completed? Yes            Alicia MONTELONGO - Registered Nurse

## 2025-02-01 NOTE — PLAN OF CARE
Goal Outcome Evaluation:   Patient a&ox4. Sleeping in between care. Medicated x2 per MAR for c/o pain in bilateral thighs. Pt hopeful to dc home today.

## 2025-02-01 NOTE — PROGRESS NOTES
[facility]     Progress Note    Patient Name: Charlette Rai  : 1935  MRN: [id number]  Primary Care Physician:  Brennan Mosqueda MD  Date of admission: 2025    Subjective   Subjective     Patient seen and doing fine with family at bedside. Much improved with ambulation. R groin pain.    Objective   Objective     Vitals:   Temp:  [97.3 °F (36.3 °C)-97.9 °F (36.6 °C)] 97.7 °F (36.5 °C)  Heart Rate:  [] 73  Resp:  [12-22] 20  BP: ()/() 60/21  Flow (L/min) (Oxygen Therapy):  [3-15] 3    Physical Exam   Constitutional: Awake, alert   Neck: Supple   Respiratory: Clear to auscultation bilaterally, nonlabored respirations    Cardiovascular: RRR, no murmurs   Abdomen: benign    Ext: No clubbing cyanosis or edema.  B radial and femoral pulses palp.  Left lower extremity is sensorimotor intact with no evidence of acute limb ischemia present and easily dopplerable bilateral pedal signals.         Assessment & Plan   Assessment / Plan     Assessment/Plan:    Patient is an 89-year-old  female with end-stage renal disease on hemodialysis currently via right IJ tunneled dialysis catheter who also has a history of peripheral vascular disease with known bilateral common iliac artery stenting and arranged bifurcation as well as renal artery angioplasty and stenting who presented to the hospital with new onset left lower extremity weakness and thigh pain but more recently concern for significant swelling S/p L iliac and LLE angiogram with no intervention.  2. LLE SFA occlusion stable and iliac stents widely patent.  No vascular etiology of L thigh pain now improving.  3.  Continue ambulation and increase in activity.  4.  Dispo per primary.  Patient would like to go home with home PT.  5. Will sign off.    Active Hospital Problems:  Active Hospital Problems    Diagnosis     **Weakness     Peripheral vascular disease of lower extremity

## 2025-02-01 NOTE — PLAN OF CARE
Goal Outcome Evaluation:      Alert and oriented x4. X1 complaint of pain, medicated per mar. Up in chair. Dialysis cath changed.

## 2025-02-01 NOTE — PROGRESS NOTES
[facility]   Hospitalist Progress Note  Date: 2025  Patient Name: Charlette Rai  : 1935  MRN: [id number]  Date of admission: 2025  Room/Bed: 4003/1      Subjective   Subjective     Chief Complaint: Weakness    Summary: Chralette Rai is a 89 y.o. female  past medical history of ESRD on HD MWF, PAD s/p lower extremity stenting, JONAH s/p stenting, hypothyroidism, COPD on 3 L nasal cannula, HFpEF, prior lung cancer status post chemotherapy and right upper lobectomy who presents to the ER due to new onset left lower extremity swelling with pain and weakness.      Patient admitted for evaluation of left lower extremity pain.  Given her history of previous vascular disease vascular surgery was consulted.  They rechecked ABIs and did decide to take her for angiogram, no acute issues were found at that time and no intervention was placed.  Lower extremity weakness and pain did improve during hospitalization.  Nephrology was consulted and she was continued on her routine dialysis MWF.  CT chest revealed new irregular 3.3 cm subpleural right lower lobe lesion.  Pulmonology was consulted, she went for IR guided biopsy on 2025, pathology results pending at time of discharge.  Hemoglobin did trend down and eventually stabilized around 7, did not require any PRBCs, iron panel obtained with high ferritin, she is continued on EPO with dialysis.  PT/OT evaluated the patient and recommended inpatient rehab, this was discussed with the patient who declines rehab at this time and wants to go home.     Interval Followup: No acute overnight events.  No acute distress.  Patient resting in the bedside chair today, denies any new needs.  She stayed overnight due to concerns from nephrology about her lower extremity following dialysis and patient wanting home health set up.  Vascular evaluated last night and reports no concerns from RN about the leg that she is okay to go home.    Objective   Objective      Vitals:   Temp:  [97.5 °F (36.4 °C)-98.1 °F (36.7 °C)] 98.1 °F (36.7 °C)  Heart Rate:  [73-85] 74  Resp:  [16-20] 20  BP: ()/() 110/52  Flow (L/min) (Oxygen Therapy):  [3] 3    Physical Exam   Gen: NAD, Alert and Oriented  Pulm: Nasal cannula, no respiratory distress  Abd: soft, nondistended  Extremities: no pitting edema    Result Review    Result Review:  I have personally reviewed these results:  [x]  Laboratory      Lab 02/01/25  0530 01/31/25  0448 01/30/25  0620 01/29/25  0546 01/28/25  1315 01/28/25  0511 01/27/25  2203 01/27/25  1416 01/26/25  2204   WBC 4.04  --  4.28 4.09  --  4.64  --  5.44 3.90   HEMOGLOBIN 7.1* 7.0* 7.1* 7.3*  --  8.1*  --  8.5* 7.5*   HEMATOCRIT 24.8* 24.2* 24.4* 26.3*  --  25.9*  --  30.2* 26.5*   PLATELETS 112*  --  98* 101*  --  138*  --  127* 116*   NEUTROS ABS  --   --  2.70 3.15  --  2.90  --  4.14 2.35   IMMATURE GRANS (ABS)  --   --  0.01  --   --  0.01  --  0.02 0.01   LYMPHS ABS  --   --  0.61*  --   --  0.75  --  0.49* 0.65*   MONOS ABS  --   --  0.80  --   --  0.82  --  0.63 0.72   EOS ABS  --   --  0.15 0.08  --  0.14  --  0.14 0.15   .4*  --  107.5* 112.4*  --  108.4*  --  109.4* 109.5*   PROTIME  --   --   --   --  15.3*  --   --  14.1 15.4*   APTT  --   --  35.5*  --  46.9* 151.7*   < > 29.9* 32.0    < > = values in this interval not displayed.         Lab 02/01/25 0530 01/31/25 0448 01/30/25  0620 01/29/25  0546 01/28/25  0511   SODIUM 135* 135* 136 135* 139   POTASSIUM 4.1 4.0 4.0 4.3 4.2   CHLORIDE 103 102 103 104 106   CO2 23.3 23.2 25.8 16.2* 24.1   ANION GAP 8.7 9.8 7.2 14.8 8.9   BUN 22 30* 19 32* 18   CREATININE 3.31* 4.60* 3.18* 4.62* 3.47*   EGFR 13.0* 8.8* 13.6* 8.7* 12.3*   GLUCOSE 87 99 97 80 102*   CALCIUM 8.0* 7.6* 7.6* 7.7* 8.0*   MAGNESIUM  --   --  2.0 2.1 2.0   PHOSPHORUS 3.9 5.4* 4.2 5.3* 4.8*         Lab 02/01/25  0530 01/31/25 0448 01/30/25  0620 01/28/25  0511 01/26/25  2204   TOTAL PROTEIN  --   --   --   --  5.8*    ALBUMIN 2.7* 2.6* 2.7*   < > 3.3*   GLOBULIN  --   --   --   --  2.5   ALT (SGPT)  --   --   --   --  14   AST (SGOT)  --   --   --   --  25   BILIRUBIN  --   --   --   --  0.2   ALK PHOS  --   --   --   --  292*    < > = values in this interval not displayed.         Lab 01/28/25  1315 01/27/25  1416 01/26/25  2204   PROTIME 15.3* 14.1 15.4*   INR 1.18* 1.07 1.20*             Lab 01/28/25  0511 01/27/25  1416 01/26/25  2204   IRON  --   --  41   IRON SATURATION (TSAT)  --   --  17*   TIBC  --   --  235*   TRANSFERRIN  --   --  158*   FERRITIN  --   --  1,048.00*   ABO TYPING O   < >  --    RH TYPING Positive   < >  --    ANTIBODY SCREEN Negative  --   --     < > = values in this interval not displayed.         Brief Urine Lab Results  (Last result in the past 365 days)        Color   Clarity   Blood   Leuk Est   Nitrite   Protein   CREAT   Urine HCG        05/01/24 2102 Dark Yellow   Cloudy   Negative   Small (1+)   Negative   >=300 mg/dL (3+)                 [x]  Microbiology   Microbiology Results (last 10 days)       ** No results found for the last 240 hours. **          [x]  Radiology  XR Chest 1 View    Result Date: 1/31/2025  Impression: 1.Haziness throughout the left hemithorax remains. There is volume loss left lung which has been noted.. A small left effusion not excluded. 2.Probable atelectatic changes right lung. 3.Scarring right upper lobe. Electronically Signed: Tony Kurtz MD  1/31/2025 3:59 PM EST  Workstation ID: [id number]    XR Chest 1 View    Result Date: 1/31/2025  Impression: 1.No evidence of right-sided pneumothorax status post lung biopsy. 2.Again seen is opacity throughout the left lung presumably from pleural fluid, atelectasis and posttreatment changes. Electronically Signed: Reno Contreras MD  1/31/2025 11:56 AM EST  Workstation ID: [id number]    CT Needle Biopsy Lung    Result Date: 1/31/2025  Impression: 1.Successful CT-guided percutaneous core needle lung biopsy of right lower lobe  lung mass without evidence of complication. Electronically Signed: Ildefonso Sanches MD  1/31/2025 11:20 AM EST  Workstation ID: [id number]    CT Chest Without Contrast Diagnostic    Result Date: 1/28/2025     1. There is a new irregular 3.3 cm subpleural opacity in the right lower lobe. It may be partially cavitary. A malignant process is possible. Consider biopsy for further evaluation. Nuclear Medicine (NM) whole-body FDG-PET-CT scan may be helpful in further evaluation, as well.  2. Please see above comments for further detail.    Portions of this note were completed with a voice recognition program.  Electronically Signed By-Chicho Jones MD On:1/28/2025 4:46 AM      CT Angio Abdominal Aorta Bilateral Iliofem Runoff    Result Date: 1/27/2025  Impression: 1. Chronic bilateral SFA occlusions stable since 2020. Bilateral distal at the level of the popliteal arteries through chronic developed collaterals, with three-vessel runoff to the level of the ankle bilaterally. 2. Severe intra-abdominal atherosclerotic disease including high-grade stenosis of proximal celiac artery, proximal SMA and proximal right renal artery. Mild ectatic dilation of the infrarenal abdominal aorta. All vascular stents in the abdomen are patent. No evidence of solid organ or hollow viscus ischemia. 3. Stable left common femoral artery pseudoaneurysm since 5/31/2024. 4. Masslike right lower lobe consolidation which could reflect a focal pneumonia versus potential neoplasm in patient with prior history of lung malignancy. Consider pulmonary consultation for further management. 5. Findings suggesting volume overload/third spacing including small right pleural effusion, interstitial pulmonary edema and right lower lobe groundglass suspicious for early alveolar edema versus nonspecific infectious/inflammatory process. Body wall and bilateral lower extremity edema, left greater than right. 6. Cholelithiasis. 7. Numerous additional  chronic/ancillary findings as above. Electronically Signed: Ildefonso Purcell MD  1/27/2025 1:11 PM EST  Workstation ID: [id number]    XR Hip With or Without Pelvis 2 - 3 View Left    Result Date: 1/27/2025  No acute fracture or acute malalignment is identified.   Portions of this note were completed with a voice recognition program.   Electronically Signed By-Chicho Jones MD On:1/27/2025 5:15 AM     []  EKG/Telemetry   []  Cardiology/Vascular   []  Pathology  []  Old records  []  Other:    Assessment & Plan   Assessment / Plan     Assessment:  Left lower extremity pain  Left lower extremity weakness secondary to pain  Chronic hypoxic respiratory failure, 3 L baseline  HFpEF, not in acute exacerbation  COPD, not in acute exacerbation  ESRD on HD MWF  Chronic microcytic anemia  Hypertension  History of JONAH s/p stenting  PAD s/p lower extremity stenting  Hypothyroidism  Recurrent Pseudomonas pneumonia, history JUAN nebs  GERD  History of lung cancer with new pulmonary nodule    Plan:  Continue inpatient admission  Pulmonology consulted.  CT chest with new irregular 3.3 cm subpleural right lower lobe lesion.  S/p IR guided biopsy.  Pathology pending.  Nephrology following.  Continue dialysis MWF.  Right IJ TDC.  Vascular following.  Patient has multiple chronic occlusions.  S/p angiogram with no intervention.    Continue Brovana, Pulmicort  Continue JUAN nebs  Home meds reviewed.  Continue aspirin and Plavix  PT/OT  A.m. labs    Disposition: Patient discharged home today.    Discussed with RN.    VTE Prophylaxis:  Pharmacologic VTE prophylaxis orders are present.        CODE STATUS:   Code Status (Patient has no pulse and is not breathing): CPR (Attempt to Resuscitate)  Medical Interventions (Patient has pulse or is breathing): Full Support      Electronically signed by Dariana Valentin DO, 2/1/2025, 12:53 EST.

## 2025-02-01 NOTE — PROGRESS NOTES
Pulmonary / Critical Care Progress Note      Patient Name: Charlette Rai  : 1935  MRN: [id number]  Primary Care Physician:  Brennan Mosqueda MD  Date of admission: 2025    Subjective   Subjective   Follow-up for rt lower lobe nodule    Over past 24 hours: Had dialysis, was on room air. Had IR biopsy.     No acute events overnight.     This morning,   Feels better.   On nebs.   No chest pain.         Objective   Objective     Vitals:   Temp:  [97.3 °F (36.3 °C)-98.1 °F (36.7 °C)] 98.1 °F (36.7 °C)  Heart Rate:  [] 77  Resp:  [12-22] 20  BP: ()/() 110/52  Flow (L/min) (Oxygen Therapy):  [3-15] 3  Physical Exam   Vital Signs Reviewed   General:  WDWN, Alert, in no acute distres  HEENT:  PERRL, EOMI.  OP, nares clear, no sinus tenderness  Neck:  Supple, no JVD, no thyromegaly  Chest:  good aeration, clear to auscultation bilaterally, tympanic to percussion bilaterally, no work of breathing noted  CV: RRR, no MGR, pulses 2+, equal.  Abd:  Soft, NT, ND, + BS, no HSM  EXT:  no clubbing, no cyanosis, no edema  Neuro:  A&Ox3, CN grossly intact, no focal deficits.  Skin: No rashes or lesions noted      Result Review    Result Review:  I have personally reviewed the results from the time of this admission to 2025 07:42 EST and agree with these findings:  [x]  Laboratory  [x]  Microbiology  [x]  Radiology  [x]  EKG/Telemetry   []  Cardiology/Vascular   []  Pathology  []  Old records  []  Other:  Most notable findings include:         Lab 25  0530 25  0448 25  0620 25  0546 25  0511 25  1416 25  2204   WBC 4.04  --  4.28 4.09 4.64 5.44 3.90   HEMOGLOBIN 7.1* 7.0* 7.1* 7.3* 8.1* 8.5* 7.5*   HEMATOCRIT 24.8* 24.2* 24.4* 26.3* 25.9* 30.2* 26.5*   PLATELETS 112*  --  98* 101* 138* 127* 116*   SODIUM 135* 135* 136 135* 139  --  145   POTASSIUM 4.1 4.0 4.0 4.3 4.2  --  5.2   CHLORIDE 103 102 103 104 106  --  105   CO2 23.3 23.2 25.8 16.2* 24.1  --   28.8   BUN 22 30* 19 32* 18  --  34*   CREATININE 3.31* 4.60* 3.18* 4.62* 3.47*  --  4.70*   GLUCOSE 87 99 97 80 102*  --  188*   CALCIUM 8.0* 7.6* 7.6* 7.7* 8.0*  --  7.8*   PHOSPHORUS 3.9 5.4* 4.2 5.3* 4.8*  --   --    TOTAL PROTEIN  --   --   --   --   --   --  5.8*   ALBUMIN 2.7* 2.6* 2.7* 2.4* 3.0*  --  3.3*   GLOBULIN  --   --   --   --   --   --  2.5     XR Chest 1 View    Result Date: 1/31/2025  XR CHEST 1 VW Date of Exam: 1/31/2025 2:51 PM EST Indication: s/p right lung biopsy Comparison: January 31, 2025 Findings: Dialysis catheter tip is around the area of the superior vena cava. The patient is rotated. There is haziness throughout the left hemithorax unchanged. There is scarring in the right upper lobe as well as probable atelectatic changes within the right lung. There probably is a left pleural effusion versus scarring. Atherosclerotic vascular calcification is noted.. There is volume loss left lung from previous upper lobe lobectomy. There is no definite pneumothorax.     Impression: Impression: 1.Haziness throughout the left hemithorax remains. There is volume loss left lung which has been noted.. A small left effusion not excluded. 2.Probable atelectatic changes right lung. 3.Scarring right upper lobe. Electronically Signed: Tony Kurtz MD  1/31/2025 3:59 PM EST  Workstation ID: [id number]    XR Chest 1 View    Result Date: 1/31/2025  XR CHEST 1 VW Date of Exam: 1/31/2025 11:35 AM EST Indication: s/p lung biopsy Comparison: 1/27/2025 CT chest Findings: Right chest wall tunneled catheter. Again seen is leftward mediastinal shift similar to prior examinations. Again seen is opacity throughout the left lung presumably from pleural fluid, atelectasis and posttreatment changes. Again seen is nodular opacities in the right midlung. No substantial right-sided pneumothorax. No evidence of acute osseous abnormalities. Visualized upper abdomen is unremarkable.     Impression: Impression: 1.No evidence of  right-sided pneumothorax status post lung biopsy. 2.Again seen is opacity throughout the left lung presumably from pleural fluid, atelectasis and posttreatment changes. Electronically Signed: Reno Contreras MD  1/31/2025 11:56 AM EST  Workstation ID: [id number]       Assessment & Plan   Assessment / Plan     Active Hospital Problems:  Active Hospital Problems    Diagnosis     **Weakness     Peripheral vascular disease of lower extremity          Impression:   Right lower lobe pulmonary nodule     Plan:   S/p IR biopsy.  No complications.   Continue nebs.   Can DC home.     The patient does need to follow-up with us in the office to follow-up on the results of this biopsy as I strongly suspect that this would likely represent malignancy          VTE Prophylaxis:  Pharmacologic VTE prophylaxis orders are present.        CODE STATUS:   Code Status (Patient has no pulse and is not breathing): CPR (Attempt to Resuscitate)  Medical Interventions (Patient has pulse or is breathing): Full Support      I personally reviewed pertinent labs, imaging and provider notes. Discussed with bedside nurse and will discuss with primary service.       Electronically signed by Khalif Yanez MD, 2/1/2025, 07:42 EST.

## 2025-02-03 ENCOUNTER — TRANSITIONAL CARE MANAGEMENT TELEPHONE ENCOUNTER (OUTPATIENT)
Dept: CALL CENTER | Facility: HOSPITAL | Age: 89
End: 2025-02-03
Payer: MEDICARE

## 2025-02-03 NOTE — OUTREACH NOTE
Call Center TCM Note      Flowsheet Row Responses   [facility] facility patient discharged from? [place]   Does the patient have one of the following disease processes/diagnoses(primary or secondary)? Other   TCM attempt successful? No  [VR- contacts]   Unsuccessful attempts Attempt 1            Patti Martinez RN    2/3/2025, 10:36 EST

## 2025-02-03 NOTE — OUTREACH NOTE
Call Center TCM Note      Flowsheet Row Responses   [facility] patient discharged from? [place]   Does the patient have one of the following disease processes/diagnoses(primary or secondary)? Other   TCM attempt successful? Yes   Call start time 1621   Call end time 1625   Discharge diagnosis Weakness   Meds reviewed with patient/caregiver? Yes   Is the patient having any side effects they believe may be caused by any medication additions or changes? No   Does the patient have all medications ordered at discharge? Yes   Is the patient taking all medications as directed (includes completed medication regime)? Yes   Does the patient have an appointment with their PCP within 7-14 days of discharge? No   Nursing Interventions Routed TCM call to PCP office, Patient declined scheduling/rescheduling appointment at this time   [facility]   Has home health visited the patient within 72 hours of discharge? Call prior to 72 hours   Psychosocial issues? No   Did the patient receive a copy of their discharge instructions? Yes   Nursing interventions Reviewed instructions with patient   What is the patient's perception of their health status since discharge? Same   Is the patient/caregiver able to teach back signs and symptoms related to disease process for when to call PCP? Yes   Is the patient/caregiver able to teach back signs and symptoms related to disease process for when to call 911? Yes   Is the patient/caregiver able to teach back the hierarchy of who to call/visit for symptoms/problems? PCP, Specialist, Home health nurse, Urgent Care, ED, 911 Yes   TCM call completed? Yes   Wrap up additional comments Pt unable to accept any HOSP DC FU appts offered Pt will call office for appt. Pt reports she is about the same. Pt did go to  today   Call end time 1625            Patti Martinez RN    2/3/2025, 16:26 EST

## 2025-02-04 ENCOUNTER — HOSPITAL ENCOUNTER (OUTPATIENT)
Facility: HOSPITAL | Age: 89
Discharge: HOME | End: 2025-02-04
Payer: MEDICARE

## 2025-02-04 ENCOUNTER — TELEPHONE (OUTPATIENT)
Dept: PULMONOLOGY | Facility: CLINIC | Age: 89
End: 2025-02-04

## 2025-02-04 ENCOUNTER — TELEPHONE (OUTPATIENT)
Dept: INTERNAL MEDICINE | Age: 89
End: 2025-02-04
Payer: MEDICARE

## 2025-02-04 ENCOUNTER — OFFICE VISIT (OUTPATIENT)
Dept: PULMONOLOGY | Facility: CLINIC | Age: 89
End: 2025-02-04
Payer: MEDICARE

## 2025-02-04 VITALS
HEIGHT: 65 IN | DIASTOLIC BLOOD PRESSURE: 61 MMHG | HEART RATE: 82 BPM | SYSTOLIC BLOOD PRESSURE: 132 MMHG | WEIGHT: 142 LBS | OXYGEN SATURATION: 89 % | TEMPERATURE: 97.3 F | RESPIRATION RATE: 16 BRPM | BODY MASS INDEX: 23.66 KG/M2

## 2025-02-04 VITALS
SYSTOLIC BLOOD PRESSURE: 142 MMHG | WEIGHT: 142 LBS | BODY MASS INDEX: 23.66 KG/M2 | DIASTOLIC BLOOD PRESSURE: 64 MMHG | HEART RATE: 82 BPM | HEIGHT: 65 IN

## 2025-02-04 DIAGNOSIS — N18.6 ESRD (END STAGE RENAL DISEASE) ON DIALYSIS: ICD-10-CM

## 2025-02-04 DIAGNOSIS — F17.211 NICOTINE DEPENDENCE, CIGARETTES, IN REMISSION: ICD-10-CM

## 2025-02-04 DIAGNOSIS — Z99.2 ESRD (END STAGE RENAL DISEASE) ON DIALYSIS: ICD-10-CM

## 2025-02-04 DIAGNOSIS — E78.5 HYPERLIPIDEMIA LDL GOAL <70: ICD-10-CM

## 2025-02-04 DIAGNOSIS — I10 ESSENTIAL HYPERTENSION: ICD-10-CM

## 2025-02-04 DIAGNOSIS — R91.8 OTHER NONSPECIFIC ABNORMAL FINDING OF LUNG FIELD: ICD-10-CM

## 2025-02-04 DIAGNOSIS — I50.32 CHRONIC HEART FAILURE WITH PRESERVED EJECTION FRACTION: Primary | ICD-10-CM

## 2025-02-04 DIAGNOSIS — J44.9 CHRONIC OBSTRUCTIVE PULMONARY DISEASE, UNSPECIFIED COPD TYPE: ICD-10-CM

## 2025-02-04 DIAGNOSIS — C34.91 SQUAMOUS CELL CARCINOMA LUNG, RIGHT: Primary | ICD-10-CM

## 2025-02-04 LAB
CYTO UR: NORMAL
LAB AP CASE REPORT: NORMAL
LAB AP CLINICAL INFORMATION: NORMAL
LAB AP DIAGNOSIS COMMENT: NORMAL
LAB AP SPECIAL STAINS: NORMAL
PATH REPORT.FINAL DX SPEC: NORMAL
PATH REPORT.GROSS SPEC: NORMAL

## 2025-02-04 PROCEDURE — 1159F MED LIST DOCD IN RCRD: CPT | Performed by: NURSE PRACTITIONER

## 2025-02-04 PROCEDURE — G0463 HOSPITAL OUTPT CLINIC VISIT: HCPCS

## 2025-02-04 PROCEDURE — 1160F RVW MEDS BY RX/DR IN RCRD: CPT | Performed by: NURSE PRACTITIONER

## 2025-02-04 PROCEDURE — 99214 OFFICE O/P EST MOD 30 MIN: CPT | Performed by: NURSE PRACTITIONER

## 2025-02-04 RX ORDER — LOSARTAN POTASSIUM 25 MG/1
25 TABLET ORAL NIGHTLY
Qty: 90 TABLET | Refills: 0 | Status: SHIPPED | OUTPATIENT
Start: 2025-02-04

## 2025-02-04 RX ORDER — BUDESONIDE 0.5 MG/2ML
0.5 INHALANT ORAL
Qty: 60 EACH | Refills: 5 | Status: SHIPPED | OUTPATIENT
Start: 2025-02-04

## 2025-02-04 RX ORDER — IPRATROPIUM BROMIDE AND ALBUTEROL SULFATE 2.5; .5 MG/3ML; MG/3ML
3 SOLUTION RESPIRATORY (INHALATION) EVERY 6 HOURS PRN
Qty: 360 ML | Refills: 2 | Status: SHIPPED | OUTPATIENT
Start: 2025-02-04

## 2025-02-04 NOTE — TELEPHONE ENCOUNTER
Pathology called today in regards to her lung biopsy, Rt Lung Squamous Cell Carcinoma.     *She is being scheduled with Beau

## 2025-02-04 NOTE — ADDENDUM NOTE
Encounter addended by: Tanvi Orona RN on: 2/4/2025 3:22 PM   Actions taken: Charge Capture section accepted

## 2025-02-04 NOTE — PROGRESS NOTES
Office Visit    Chief Complaint  Chronic heart failure with preserved ejection fraction    Subjective            Charlette Rai presents to [facility]  History of Present Illness  Ms. Rai is an 89-year-old female who presented to the office today for follow-up due to heart failure with preserved ejection fraction, hypertension, hyperlipidemia, end-stage renal disease on dialysis Monday Wednesday and Friday.  Patient was recently hospitalized for pedal edema and lower extremity pain.  Currently being followed by vascular.  Patient does have dyspnea with mild exertion.  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 While hospitalized many cardiac meds were discontinued.  Per her home log her systolic has been elevated to 140s to 160s.  Will restart her losartan at a low dose.  Patient denies any chest pain, dizziness, orthopnea or syncopal episodes.      Past Medical History:   Diagnosis Date    Allergic rhinitis     Anaphylactic shock, unspecified, initial encounter 12/05/2023    Anemia     Arthritis     Asthma     USE INHALERS AND NEBULIZERS    Back pain     Bladder disorder     Cancer     LEFT LUNG CANCER 2005 SURGERY AND CHEMO DONE  AND CURRENTLY 4/ 14/22  RIGHT LUNG CANCER HAS ONLY RECEIVED RADIATION THUS FAR    Chronic kidney disease     stage 3    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 DENIES CP BUT DOES HAVE SOA CHRONIC ISSUE COPD/LUNG CANCER    COPD (chronic obstructive pulmonary disease)     FOLLOWED BY DR MARIAJOSE BAILEY    Coronary artery disease     DENIES CP BUT DOES GET SOA MOST OF THE TIME WITH EXERTION BUT OCC AT REST CHRONIC ISSUE COPD/LUNG CANCER    Deep vein thrombosis     Diabetes mellitus     DOES NOT CHECK BS DAILY    Disease of thyroid gland     HYPOTHYROIDISM     Essential hypertension     Gastric ulcer     GERD (gastroesophageal reflux disease)     Heart murmur     History of transfusion     NO ISSUES POST TRANSFUSION WAS MANY YEARS AGO    Hyperlipemia     Leukocytopenia     FOLLOWED BY DR MIR BAILEY    Limb swelling     Lumbago     Lumbar spinal stenosis     Lung cancer     Migraine headache     Multiple joint pain     On home oxygen therapy     4L/NC PRN    Osteopenia     PNA (pneumonia) 05/04/2024    Pseudomonas pneumonia 04/29/2024    Reflux esophagitis     Shortness of breath     Thyroid nodule     HAS ULTRASOUND YEARLY BEING MONITORED    Vascular disease     Vitamin D deficiency        No Known Allergies     Past Surgical History:   Procedure Laterality Date    ABDOMINAL SURGERY      ANGIOPLASTY RENAL ARTERY Left 06/14/2024    stent placement    APPENDECTOMY      ARTERIOGRAM N/A 6/14/2024    Procedure: Arteriogram, right radial access, aortogram and renal arteriogram with possible intervention.;  Surgeon: Dio Miguel MD;  Location: [facility] CATH INVASIVE LOCATION;  Service: Peripheral Vascular;  Laterality: N/A;    ARTERIOVENOUS FISTULA/SHUNT SURGERY Left 05/10/2022    Procedure: Left basilic vein transposition;  Surgeon: Wan Barksdale MD;  Location: [facility] MAIN OR;  Service: Vascular;  Laterality: Left;    ARTERIOVENOUS FISTULA/SHUNT SURGERY Left 03/23/2023    Procedure: Ligation of left arm arteriovenous fistula;  Surgeon: Wan Barksdale MD;  Location: [facility] MAIN OR;  Service: Vascular;  Laterality: Left;    ARTERIOVENOUS FISTULA/SHUNT SURGERY Left 11/30/2023    Procedure: Creation of left arm arteriovenous graft;  Surgeon: Wan Barksdale MD;  Location: [facility] MAIN OR;  Service: Vascular;  Laterality: Left;    BRONCHOSCOPY N/A 04/24/2024    Procedure: BRONCHOSCOPY WITH BAL AND WASHINGS;  Surgeon: Khalif Yanez MD;  Location: [facility] ENDOSCOPY;  Service: Pulmonary;  Laterality: N/A;  MUCUS PLUGGING    CARDIAC CATHETERIZATION  1996    CARDIAC SURGERY      CARDIAC  SURGERY      fluid drained from heart    CATARACT EXTRACTION, BILATERAL      COLONOSCOPY      ENDOSCOPY  2016    FEMORAL ARTERY STENT Bilateral     HYSTERECTOMY      LUNG BIOPSY Left     lobectomy upper lung caner    LUNG VOLUME REDUCTION      OTHER SURGICAL HISTORY      artifical joints/limbs    REPLACEMENT TOTAL KNEE Left 2016    SHUNT O GRAM N/A 2025    Procedure: dialysis shuntogram;  Surgeon: Court Valente MD;  Location: [facility] INVASIVE LOCATION;  Service: Peripheral Vascular;  Laterality: N/A;    UPPER GASTROINTESTINAL ENDOSCOPY          Social History     Tobacco Use    Smoking status: Former     Current packs/day: 0.00     Average packs/day: 1 pack/day for 52.5 years (52.5 ttl pk-yrs)     Types: Cigarettes     Start date:      Quit date: 2004     Years since quittin.6     Passive exposure: Past    Smokeless tobacco: Never   Vaping Use    Vaping status: Never Used   Substance Use Topics    Alcohol use: Not Currently     Comment: beer many years ago    Drug use: Never       Family History   Problem Relation Age of Onset    Arthritis Mother     Arthritis Father     Cancer Brother     Diabetes Brother     Other Brother         blood disease     Prostate cancer Brother     Cancer Brother     Prostate cancer Brother     Cancer Brother     Cancer Brother     Malig Hyperthermia Neg Hx     Colon cancer Neg Hx         Prior to Admission medications    Medication Sig Start Date End Date Taking? Authorizing Provider   acetaminophen (TYLENOL) 325 MG tablet Take 2 tablets by mouth Every 6 (Six) Hours As Needed for Mild Pain.    Provider, MD Sarah   albuterol sulfate  (90 Base) MCG/ACT inhaler Inhale 2 puffs Every 4 (Four) Hours As Needed for Wheezing. 24   Laura Franco APRN   aspirin 81 MG EC tablet Take 1 tablet by mouth Daily.    Provider, MD Sarah   budesonide (Pulmicort) 0.5 MG/2ML nebulizer solution Take 2 mL by nebulization 2 (Two) Times a  Day. 2/4/25   Vandana Hannah APRN   carvedilol (COREG) 25 MG tablet Take 1 tablet by mouth 2 (Two) Times a Day With Meals. 9/24/24   Halima Quick APRN   clopidogrel (Plavix) 75 MG tablet Take 1 tablet by mouth Daily. 6/15/24 6/15/25  Dio Miguel MD   dexlansoprazole (DEXILANT) 60 MG capsule Take 1 capsule by mouth Daily. 8/5/24   Brennan Mosqueda MD   ferrous sulfate 325 (65 FE) MG tablet Take 1 tablet by mouth Daily With Breakfast.    Sarah Parker MD   fluticasone (FLONASE) 50 MCG/ACT nasal spray Administer 1 spray into the nostril(s) as directed by provider Daily. 9/12/24   Sarah Parker MD   furosemide (LASIX) 20 MG tablet Take 1 tablet by mouth Daily. 6/18/24   Halima Quick APRN   HYDROcodone-acetaminophen (NORCO) 5-325 MG per tablet Take 1 tablet by mouth Every 4 (Four) Hours As Needed for Moderate Pain for up to 3 days. 2/1/25 2/4/25  Dariana Valentin DO   ipratropium-albuterol (DUO-NEB) 0.5-2.5 mg/3 ml nebulizer Take 3 mL by nebulization Every 6 (Six) Hours As Needed for Wheezing. 2/4/25   Vandana Hannah APRN   isosorbide mononitrate (IMDUR) 30 MG 24 hr tablet Take 1 tablet by mouth Every Night. 9/17/24   Jazzy Addison APRN   levocetirizine (XYZAL) 5 MG tablet TAKE 1 TABLET DAILY 6/23/23   Paloma James MD   levothyroxine (SYNTHROID, LEVOTHROID) 50 MCG tablet Take 1 tablet by mouth Daily. 7/8/24   Sarah Parker MD   lidocaine-prilocaine (EMLA) 2.5-2.5 % cream Apply 1 Application topically to the appropriate area as directed Take As Directed. Apply to dialysis access site 30-45 minutes prior to dialysis 9/13/24   Sarah Parker MD   Psyllium ([facility] DAILY FIBER PO) Take 2 tablets by mouth Daily.    Sarah Parker MD   rosuvastatin (CRESTOR) 20 MG tablet Take 1 tablet by mouth Every Night. 9/17/24   Jazzy Addison APRN   sevelamer (RENVELA) 800 MG tablet Take 1 tablet by mouth 3 (Three) Times a Day With Meals. 11/19/24    "Brennan Mosqueda MD   tobramycin PF (JUAN) 300 MG/5ML nebulizer solution Take 5 mL by nebulization 2 (Two) Times a Day. 30 days on 30 days off 9/12/24   Laura Franco APRN   vitamin D (ERGOCALCIFEROL) 1.25 MG (77942 UT) capsule capsule Take 1 capsule by mouth Every 14 (Fourteen) Days.  Patient taking differently: Take 1 capsule by mouth Every 14 (Fourteen) Days. The 15th and 30th of the month 10/24/24   Brennan Mosqueda MD   budesonide (Pulmicort) 0.5 MG/2ML nebulizer solution Take 2 mL by nebulization 2 (Two) Times a Day. 8/28/24 2/4/25  Brennan Mosqueda MD   ipratropium-albuterol (DUO-NEB) 0.5-2.5 mg/3 ml nebulizer INHALE 1 vial (3 ml) per NEBULIZER FOUR TIMES DAILY FOR 30 DAYS  Patient not taking: Reported on 2/4/2025 12/30/24 2/4/25  Brennan Mosqueda MD        Review of Systems   Constitutional:  Positive for fatigue.   Respiratory:  Positive for shortness of breath. Negative for cough.    Cardiovascular:  Positive for leg swelling. Negative for chest pain and palpitations.   Neurological:  Negative for dizziness.        Symptom Course: Been Unchanged    Weight Trend: Fluctuating Minimally     Objective     /64   Pulse 82   Ht 165.1 cm (65\")   Wt 64.4 kg (142 lb)   BMI 23.63 kg/m²       Physical Exam  Constitutional:       General: She is awake.      Appearance: Normal appearance.   Neck:      Thyroid: No thyromegaly.      Vascular: No carotid bruit or JVD.   Cardiovascular:      Rate and Rhythm: Normal rate and regular rhythm.      Chest Wall: PMI is not displaced.      Pulses: Normal pulses.      Heart sounds: Normal heart sounds, S1 normal and S2 normal. No murmur heard.     No friction rub. No gallop. No S3 or S4 sounds.   Pulmonary:      Effort: Pulmonary effort is normal.      Breath sounds: Normal breath sounds and air entry. No wheezing, rhonchi or rales.   Abdominal:      General: Bowel sounds are normal.      Palpations: Abdomen is soft. There is no mass.      " Tenderness: There is no abdominal tenderness.   Musculoskeletal:      Cervical back: Neck supple.      Right lower leg: No edema.      Left lower leg: No edema.   Neurological:      Mental Status: She is alert and oriented to person, place, and time.   Psychiatric:         Mood and Affect: Mood normal.         Behavior: Behavior is cooperative.           Result Review :                      Lab Results   Component Value Date    PROBNP 28,306.0 (H) 01/14/2025    PROBNP 15,166.0 (H) 10/31/2024    PROBNP 17,913.0 (H) 09/30/2024     09/09/2019     04/11/2019     CMP          1/30/2025    06:20 1/31/2025    04:48 2/1/2025    05:30   CMP   Glucose 97  99  87    BUN 19  30  22    Creatinine 3.18  4.60  3.31    EGFR 13.6  8.8  13.0    Sodium 136  135  135    Potassium 4.0  4.0  4.1    Chloride 103  102  103    Calcium 7.6  7.6  8.0    Albumin 2.7  2.6  2.7    BUN/Creatinine Ratio 6.0  6.5  6.6    Anion Gap 7.2  9.8  8.7      CBC w/diff          1/30/2025    06:20 1/31/2025    04:48 2/1/2025    05:30   CBC w/Diff   WBC 4.28   4.04    RBC 2.27   2.31    Hemoglobin 7.1  7.0  7.1    Hematocrit 24.4  24.2  24.8    .5   107.4    MCH 31.3   30.7    MCHC 29.1   28.6    RDW 15.4   15.3    Platelets 98   112    Neutrophil Rel % 63.1      Immature Granulocyte Rel % 0.2      Lymphocyte Rel % 14.3      Monocyte Rel % 18.7      Eosinophil Rel % 3.5      Basophil Rel % 0.2         Lipid Panel          6/27/2024    09:46 8/6/2024    09:26   Lipid Panel   Total Cholesterol 126  94    Triglycerides 55  69    HDL Cholesterol 88  54    VLDL Cholesterol 12  15    LDL Cholesterol  26  25    LDL/HDL Ratio 0.31  0.49       Lab Results   Component Value Date    TSH 1.800 10/31/2024    TSH 0.724 08/06/2024    TSH 7.250 (H) 06/27/2024      Lab Results   Component Value Date    FREET4 1.05 10/31/2024    FREET4 1.40 08/06/2024    FREET4 1.28 06/27/2024      D-Dimer, Quantitative   Date Value Ref Range Status   03/13/2024 4.30 (H)  "0.00 - 0.87 MCGFEU/mL Final     Magnesium   Date Value Ref Range Status   01/30/2025 2.0 1.6 - 2.4 mg/dL Final      No results found for: \"DIGOXIN\"   A1C Last 3 Results          10/31/2024    11:00 1/13/2025    13:29 1/15/2025    00:00   HGBA1C Last 3 Results   Hemoglobin A1C 5.20  4.80  5.5          Details          This result is from an external source.                  Results for orders placed during the hospital encounter of 08/21/24    Adult Transthoracic Echo Limited W/ Cont if Necessary Per Protocol    Interpretation Summary    Left ventricular systolic function is low normal. Left ventricular ejection fraction appears to be 51 - 55%.    Left ventricular wall thickness is consistent with mild concentric hypertrophy.    The left atrial cavity is mild to moderately dilated.    Aortic valve is moderately calcified.  Mild to moderate aortic valve stenosis is present.  The peak and mean gradient across aortic valve are 21/15 mmHg with a calculated valve area of 1.64 cm².    Mitral valve apparatus is calcified with restricted opening.  Possible mitral stenosis, unable to assess severity.  Adequate Doppler studies not obtained across the mitral valve.        Assessment and Plan        Diagnoses and all orders for this visit:    1. Chronic heart failure with preserved ejection fraction (Primary)  Assessment & Plan:  Ms. Rai has heart failure preserved ejection fraction.  Continues to experience short of air and pedal edema.  She does utilize O2 via nasal cannula.  She is tolerating carvedilol and isosorbide at the current doses.  Will restart the losartan to address her elevated pressures.  She is unable to utilize the hydralazine due to dizziness.  Patient has stage IV kidney disease and is on hemodialysis.  We are unable to start SGLT2.  Will make the following changes to her heart failure medications:    1.  Restart losartan 25 mg take a half a tablet at bedtime.  If systolic stays greater than 140 increase to " a full tablet at bedtime.  2.  Continue rest of the medication as prescribed.  3.  Do heart rate blood pressure and weight log and bring it to next appointment.  4.  Do blood work 1 to 2 days prior to next visit.    Orders:  -     Comprehensive Metabolic Panel; Future  -     Magnesium; Future  -     CBC & Differential; Future  -     proBNP; Future    2. Essential hypertension  Assessment & Plan:   Patient's blood pressure has been elevated since discharge of the hospital.  Will slowly restart the losartan to address elevated blood pressure.  She is unable to utilize the hydralazine due to episodes of dizziness.  I have asked her to continue to take a heart rate blood pressure and weight log and bring it to her next appointment.    Orders:  -     Comprehensive Metabolic Panel; Future  -     Magnesium; Future  -     CBC & Differential; Future  -     proBNP; Future    3. Hyperlipidemia LDL goal <70  Assessment & Plan:  Utilizes Crestor 20 mg daily, continue same.      4. ESRD (end stage renal disease) on dialysis  Assessment & Plan:   Patient has end-stage renal disease on hemodialysis.  All diuretics are managed by renal    Orders:  -     Comprehensive Metabolic Panel; Future  -     Magnesium; Future  -     CBC & Differential; Future  -     proBNP; Future    Other orders  -     losartan (Cozaar) 25 MG tablet; Take 1 tablet by mouth Every Night. Half tab for one week then 1 tab if SBP is greater than 140  Dispense: 90 tablet; Refill: 0            Follow Up     Return in about 3 weeks (around 2/25/2025).    Patient was given instructions and counseling regarding her condition or for health maintenance advice. Please see specific information pulled into the AVS if appropriate.     HEART FAIL CL[facility]   02/04/25 11:14 EST    Patient's chronic health conditions requires close follow-up    Dictation done with Dragon    Electronically signed by DAT Dexter, 02/04/25, 12:29 PM EST.

## 2025-02-04 NOTE — TELEPHONE ENCOUNTER
Can patient to notify her of upcoming MRI, PET, Oncology, and Radiation Oncology appointments. Had to leave a voicemail for patient in regards to these appointments.

## 2025-02-04 NOTE — PATIENT INSTRUCTIONS
1.  Restart losartan 25 mg take a half a tablet at bedtime.  If systolic stays greater than 140 increase to a full tablet at bedtime.  2.  Continue rest of the medication as prescribed.  3.  Do heart rate blood pressure and weight log and bring it to next appointment.  4.  Do blood work 1 to 2 days prior to next visit.

## 2025-02-04 NOTE — ASSESSMENT & PLAN NOTE
Patient's blood pressure has been elevated since discharge of the hospital.  Will slowly restart the losartan to address elevated blood pressure.  She is unable to utilize the hydralazine due to episodes of dizziness.  I have asked her to continue to take a heart rate blood pressure and weight log and bring it to her next appointment.

## 2025-02-04 NOTE — ASSESSMENT & PLAN NOTE
Ms. Rai has heart failure preserved ejection fraction.  Continues to experience short of air and pedal edema.  She does utilize O2 via nasal cannula.  She is tolerating carvedilol and isosorbide at the current doses.  Will restart the losartan to address her elevated pressures.  She is unable to utilize the hydralazine due to dizziness.  Patient has stage IV kidney disease and is on hemodialysis.  We are unable to start SGLT2.  Will make the following changes to her heart failure medications:    1.  Restart losartan 25 mg take a half a tablet at bedtime.  If systolic stays greater than 140 increase to a full tablet at bedtime.  2.  Continue rest of the medication as prescribed.  3.  Do heart rate blood pressure and weight log and bring it to next appointment.  4.  Do blood work 1 to 2 days prior to next visit.

## 2025-02-04 NOTE — PROGRESS NOTES
Primary Care Provider  Brennan Mosqueda MD   Referring Provider  No ref. provider found    Patient Complaint  Follow-up, Results, and Shortness of Breath    Patient or patient representative verbalized consent for the use of Ambient Listening during the visit with  DAT Valdes for chart documentation. 2/4/2025  10:50 EST      Subjective       History of Presenting Illness  Charlette Rai is a pleasant 89 y.o. female  of  Dr. Yanez who presents to [facility] with history of  GERD, end-stage renal disease, coronary artery disease, peripheral vascular disease, hypertension, heart failure with preserved ejection fraction, renal artery stenosis, hypothyroidism, lung cancer in 2005 with right upper lobectomy, and COPD on 3 L of oxygen  here for followup appointment. Patient was recently in [facility] and underwent   IR guided biopsy on 1/31/2025 for irregular 3.3 cm subpleural right lower lobe lesion evident on CT scan. 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    History of Present Illness  The patient presents for evaluation of squamous cell cancer. She is accompanied by her daughter.    She has been diagnosed with squamous cell carcinoma in the right lower lobe, confirmed through a CT-guided biopsy. She has undergone a resection of the right upper lobe and is currently under the care of Dr. Medrano, with a follow-up appointment scheduled for 04/2025. She has not undergone an MRI of the brain to assess for potential metastasis. She reports no chest pain. Her current treatment regimen includes Pulmicort, DuoNeb, and albuterol. She administers DuoNeb every 6 hours as needed and Pulmicort twice daily. She also takes Moy nebulizer following a cycle of 30 days on and 30 days off. She last used her rescue inhaler or nebulizer yesterday afternoon post-dialysis.  She does do dialysis Monday Wednesday Fridays.    She is on 2 L to 3 L pulse  dose oxygen therapy at home and uses a continuous flow machine when she is out and about. She reports her breathing is okay.  Her oxygen saturation is 88%.  We placed her on continuous flow 3 L and she is rebounded to 91%.    Supplemental Information  She has a stent placed in her stomach. She is on dialysis.    MEDICATIONS  Current: Pulmicort, DuoNeb, albuterol     At present time patient denies dyspnea, coughing, wheezing, headaches, chest pain, weight loss or hemoptysis. Patient denies fevers, chills and night sweats. Charlette Rai is able to perform ADLs.      I have personally reviewed the review of systems, past family, social, medical and surgical histories; and agree with their findings.      Review of Systems   Constitutional: Negative.    HENT: Negative.     Respiratory: Negative.     Cardiovascular: Negative.    Musculoskeletal: Negative.    Neurological: Negative.    Psychiatric/Behavioral: Negative.           Family History   Problem Relation Age of Onset    Arthritis Mother     Arthritis Father     Cancer Brother     Diabetes Brother     Other Brother         blood disease     Prostate cancer Brother     Cancer Brother     Prostate cancer Brother     Cancer Brother     Cancer Brother     Malig Hyperthermia Neg Hx     Colon cancer Neg Hx         Social History     Socioeconomic History    Marital status:    Tobacco Use    Smoking status: Former     Current packs/day: 0.00     Average packs/day: 1 pack/day for 52.5 years (52.5 ttl pk-yrs)     Types: Cigarettes     Start date:      Quit date: 2004     Years since quittin.6     Passive exposure: Past    Smokeless tobacco: Never   Vaping Use    Vaping status: Never Used   Substance and Sexual Activity    Alcohol use: Not Currently     Comment: beer many years ago    Drug use: Never    Sexual activity: Defer        Past Medical History:   Diagnosis Date    Allergic rhinitis     Anaphylactic shock, unspecified, initial encounter  12/05/2023    Anemia     Arthritis     Asthma     USE INHALERS AND NEBULIZERS    Back pain     Bladder disorder     Cancer     LEFT LUNG CANCER 2005 SURGERY AND CHEMO DONE  AND CURRENTLY 4/ 14/22  RIGHT LUNG CANCER HAS ONLY RECEIVED RADIATION THUS FAR    Chronic kidney disease     stage 3    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 DENIES CP BUT DOES HAVE SOA CHRONIC ISSUE COPD/LUNG CANCER    COPD (chronic obstructive pulmonary disease)     FOLLOWED BY DR MARIAJOSE BAILEY    Coronary artery disease     DENIES CP BUT DOES GET SOA MOST OF THE TIME WITH EXERTION BUT OCC AT REST CHRONIC ISSUE COPD/LUNG CANCER    Deep vein thrombosis     Diabetes mellitus     DOES NOT CHECK BS DAILY    Disease of thyroid gland     HYPOTHYROIDISM    Essential hypertension     Gastric ulcer     GERD (gastroesophageal reflux disease)     Heart murmur     History of transfusion     NO ISSUES POST TRANSFUSION WAS MANY YEARS AGO    Hyperlipemia     Leukocytopenia     FOLLOWED BY DR MIR BAILEY    Limb swelling     Lumbago     Lumbar spinal stenosis     Lung cancer     Migraine headache     Multiple joint pain     On home oxygen therapy     4L/NC PRN    Osteopenia     PNA (pneumonia) 05/04/2024    Pseudomonas pneumonia 04/29/2024    Reflux esophagitis     Shortness of breath     Thyroid nodule     HAS ULTRASOUND YEARLY BEING MONITORED    Vascular disease     Vitamin D deficiency         Immunization History   Administered Date(s) Administered    ABRYSVO (RSV, 60+ or pregnant women 32-36 wks) 09/19/2024    COVID-19 (PFIZER) 12YRS+ (COMIRNATY) 10/19/2023, 10/10/2024    COVID-19 (PFIZER) BIVALENT 12+YRS 10/13/2022    COVID-19 (PFIZER) Purple Cap Monovalent 03/07/2021, 04/04/2021, 10/31/2021    FLUAD TRI 65YR+ 10/10/2024    Fluad Quad 65+ 10/19/2023    Fluzone (or Fluarix & Flulaval for VFC)  >6mos 09/21/2016    Fluzone High-Dose 65+YRS 10/09/2018, 10/15/2020, 10/12/2021, 09/19/2022    Hepatitis B 2 Dose Vaccine Heplisav-B 08/28/2024, 09/23/2024, 10/23/2024, 12/23/2024    Influenza Seasonal Injectable 10/01/2017, 10/01/2018    Influenza, Unspecified 10/13/2021    Pneumococcal Conjugate 13-Valent (PCV13) 10/21/2015    Pneumococcal Conjugate 20-Valent (PCV20) 08/30/2024    Pneumococcal Polysaccharide (PPSV23) 10/30/2003, 11/08/2016       No Known Allergies       Current Outpatient Medications:     acetaminophen (TYLENOL) 325 MG tablet, Take 2 tablets by mouth Every 6 (Six) Hours As Needed for Mild Pain., Disp: , Rfl:     albuterol sulfate  (90 Base) MCG/ACT inhaler, Inhale 2 puffs Every 4 (Four) Hours As Needed for Wheezing., Disp: 18 g, Rfl: 11    aspirin 81 MG EC tablet, Take 1 tablet by mouth Daily., Disp: , Rfl:     budesonide (Pulmicort) 0.5 MG/2ML nebulizer solution, Take 2 mL by nebulization 2 (Two) Times a Day., Disp: 60 each, Rfl: 5    carvedilol (COREG) 25 MG tablet, Take 1 tablet by mouth 2 (Two) Times a Day With Meals., Disp: 180 tablet, Rfl: 3    clopidogrel (Plavix) 75 MG tablet, Take 1 tablet by mouth Daily., Disp: 90 tablet, Rfl: 3    dexlansoprazole (DEXILANT) 60 MG capsule, Take 1 capsule by mouth Daily., Disp: 90 capsule, Rfl: 3    ferrous sulfate 325 (65 FE) MG tablet, Take 1 tablet by mouth Daily With Breakfast., Disp: , Rfl:     fluticasone (FLONASE) 50 MCG/ACT nasal spray, Administer 1 spray into the nostril(s) as directed by provider Daily., Disp: , Rfl:     furosemide (LASIX) 20 MG tablet, Take 1 tablet by mouth Daily., Disp: 90 tablet, Rfl: 3    HYDROcodone-acetaminophen (NORCO) 5-325 MG per tablet, Take 1 tablet by mouth Every 4 (Four) Hours As Needed for Moderate Pain for up to 3 days., Disp: 18 tablet, Rfl: 0    ipratropium-albuterol (DUO-NEB) 0.5-2.5 mg/3 ml nebulizer, Take 3 mL by nebulization Every 6 (Six) Hours As Needed for Wheezing., Disp: 360 mL, Rfl: 2     "isosorbide mononitrate (IMDUR) 30 MG 24 hr tablet, Take 1 tablet by mouth Every Night., Disp: 90 tablet, Rfl: 3    levocetirizine (XYZAL) 5 MG tablet, TAKE 1 TABLET DAILY, Disp: 90 tablet, Rfl: 1    levothyroxine (SYNTHROID, LEVOTHROID) 50 MCG tablet, Take 1 tablet by mouth Daily., Disp: , Rfl:     lidocaine-prilocaine (EMLA) 2.5-2.5 % cream, Apply 1 Application topically to the appropriate area as directed Take As Directed. Apply to dialysis access site 30-45 minutes prior to dialysis, Disp: , Rfl:     Psyllium ([employer] DAILY FIBER PO), Take 2 tablets by mouth Daily., Disp: , Rfl:     rosuvastatin (CRESTOR) 20 MG tablet, Take 1 tablet by mouth Every Night., Disp: 90 tablet, Rfl: 3    sevelamer (RENVELA) 800 MG tablet, Take 1 tablet by mouth 3 (Three) Times a Day With Meals., Disp: 270 tablet, Rfl: 3    tobramycin PF (JUAN) 300 MG/5ML nebulizer solution, Take 5 mL by nebulization 2 (Two) Times a Day. 30 days on 30 days off, Disp: 300 mL, Rfl: 6    vitamin D (ERGOCALCIFEROL) 1.25 MG (90636 UT) capsule capsule, Take 1 capsule by mouth Every 14 (Fourteen) Days. (Patient taking differently: Take 1 capsule by mouth Every 14 (Fourteen) Days. The 15th and 30th of the month), Disp: 6 capsule, Rfl: 3         Vital Signs   /61 (BP Location: Right arm, Patient Position: Sitting, Cuff Size: Adult)   Pulse 82   Temp 97.3 °F (36.3 °C)   Resp 16   Ht 165.1 cm (65\")   Wt 64.4 kg (142 lb)   SpO2 (!) 89% Comment: 3 liters  BMI 23.63 kg/m²       Objective     Physical Exam  Vitals reviewed.   Constitutional:       Appearance: Normal appearance.   HENT:      Head: Normocephalic and atraumatic.      Nose: Nose normal.      Mouth/Throat:      Mouth: Mucous membranes are moist.      Pharynx: Oropharynx is clear.   Eyes:      Extraocular Movements: Extraocular movements intact.      Conjunctiva/sclera: Conjunctivae normal.      Pupils: Pupils are equal, round, and reactive to light.   Cardiovascular:      Rate and Rhythm: Normal " rate and regular rhythm.      Pulses: Normal pulses.      Heart sounds: Normal heart sounds.   Pulmonary:      Effort: Pulmonary effort is normal.      Breath sounds: Normal breath sounds.   Abdominal:      General: Bowel sounds are normal.   Musculoskeletal:         General: Normal range of motion.      Cervical back: Normal range of motion and neck supple.   Skin:     General: Skin is warm and dry.   Neurological:      Mental Status: She is alert and oriented to person, place, and time.   Psychiatric:         Behavior: Behavior normal.         Physical Exam  No wheezing in the lungs.  A murmur is present in the heart.    Vital Signs  Oxygen saturation is 84%.         Results Review  I have personally reviewed the prior office notes, hospital records, labs, and diagnostics.  CT Chest Without Contrast Diagnostic [[id number]] (Order [id number])  Order  Status: Final result     Study Notes     Félix Nicolasen on 1/27/2025 10:25 PM EST   DLP 97  CTDI 2.75    Mass    K Jeremie Peralta on 1/27/2025 10:13 PM EST   Table formatting from the original note was not included.  lung mass  CTDI:  DLP:     Appointment Information    PACS Images     Radiology Images  Study Result    Narrative & Impression   CT CHEST WO CONTRAST DIAGNOSTIC-     Date of exam: 1/27/2025, 10:14 P.M.     Indications: Possible lung mass; imaging follow-up; r60.0-localized  edema; n18.6-end stage renal disease; m79.605-pain in left leg;  r26.2-difficulty in walking, not elsewhere classified; i73.9-peripheral  vascular disease, unspecified.     Comparisons: 1/27/2025; 1/13/2025; 11/26/2024; 9/30/2024; 9/15/2024;  8/21/2024; 7/29/2024; 7/15/2024; 5/31/2024; 5/1/2024; 4/22/2024;  4/21/2024; 3/13/2024; 11/28/2023; 12/22/2015.     TECHNIQUE:   Axial CT images were obtained of the chest without contrast  administration. Reconstructed 2D coronal and sagittal images were also  obtained. Automated exposure control and iterative construction methods  were used.      FINDINGS:  There is a new (since 4/22/2024) irregular (with spiculated margins)  possibly partially cavitated subpleural opacity in the mid  posterolateral right lower lobe, which measures approximately 3.3 x 3 x  2.4 cm in craniocaudal, long-axis axial, and short-axis axial  dimensions, respectively, as seen on image 206 of series 6, image 83 of  series 2, image 113 of series 3, image 144 of series 4, and adjacent  images. It may represent scarring. An age-indeterminate  infectious/inflammatory infiltrate is possible. However, a malignant  process cannot be excluded. There is possible associated invasion of the  right posterolateral chest wall with the finding. No definite CT  evidence of direct extension into the adjacent right posterolateral  seventh (7th) rib. Consider biopsy for further evaluation. Nuclear  Medicine (NM) whole-body FDG-PET-CT scan may be helpful in further  evaluation, as well. The previously seen air-fluid levels in the  mainstem bronchi are not clearly appreciated on the current study.  Otherwise, no significant interval change is appreciated since the prior  4/22/2024 CT study with additional findings as detailed in that exam's  report.        IMPRESSION:       1. There is a new irregular 3.3 cm subpleural opacity in the right lower  lobe. It may be partially cavitary. A malignant process is possible.  Consider biopsy for further evaluation. Nuclear Medicine (NM) whole-body  FDG-PET-CT scan may be helpful in further evaluation, as well.      2. Please see above comments for further detail.           Portions of this note were completed with a voice recognition program.     Electronically Signed By-Chicho Jones MD On:1/28/2025 4:46 AM   XR Chest 1 View [[id number]] (Order [id number])  Order  Status: Final result     Study Notes     Peg Francisco on 1/31/2025  2:58 PM EST   s/p right lung biopsy     Appointment Information    PACS Images     Radiology Images  Study Result    Narrative &  Impression   XR CHEST 1 VW     Date of Exam: 1/31/2025 2:51 PM EST     Indication: s/p right lung biopsy     Comparison: January 31, 2025     Findings:  Dialysis catheter tip is around the area of the superior vena cava. The patient is rotated. There is haziness throughout the left hemithorax unchanged. There is scarring in the right upper lobe as well as probable atelectatic changes within the right   lung. There probably is a left pleural effusion versus scarring. Atherosclerotic vascular calcification is noted.. There is volume loss left lung from previous upper lobe lobectomy. There is no definite pneumothorax.     IMPRESSION:  Impression:  1.Haziness throughout the left hemithorax remains. There is volume loss left lung which has been noted.. A small left effusion not excluded.  2.Probable atelectatic changes right lung.  3.Scarring right upper lobe.           Electronically Signed: Tony Kurtz MD    1/31/2025 3:59 PM EST    Workstation ID: [id number]     Results  Imaging  CT guided biopsy confirmed squamous cell cancer in the right lower lobe.          Assessment         Patient Active Problem List   Diagnosis    Malignant neoplasm of upper lobe of right lung    Rheumatoid arthritis    Asthma    Chronic heart failure with preserved ejection fraction    Essential hypertension    GERD (gastroesophageal reflux disease)    Hyperlipidemia LDL goal <70    Lumbar spinal stenosis    Migraine    Monoclonal paraproteinemia    Osteopenia    Oxygen dependent    Peripheral neuropathy    Stage 4 chronic kidney disease    Vitamin D deficiency    Carotid artery stenosis    Chronic right-sided thoracic back pain    Peripheral vascular disease of lower extremity    Ex-smoker    De Quervain's tenosynovitis    Arthritis of carpometacarpal (CMC) joint of right thumb    Steal syndrome of dialysis vascular access    Anemia of chronic renal failure, stage 4 (severe)    Aortic stenosis, moderate    Hematoma    End stage renal disease  on dialysis    Coronary artery calcification seen on CT scan    Frailty syndrome in geriatric patient    COPD with lower respiratory infection    Coagulation defect, unspecified    Hyperphosphatemia    Hypothyroidism (acquired)    Idiopathic osteoarthritis    Secondary multiple arthritis    Type 2 diabetes mellitus with diabetic peripheral angiopathy without gangrene    Acute on chronic respiratory failure with hypoxia and hypercapnia    Elevated troponin level not due myocardial infarction    Renal artery stenosis    S/P renal artery angioplasty    Atherosclerosis of renal artery    Abnormal serum immunoelectrophoresis    ESRD (end stage renal disease) on dialysis    Hypoxia    NSVT (nonsustained ventricular tachycardia)    Hypertensive heart and chronic kidney disease with heart failure and with stage 5 chronic kidney disease, or end stage renal disease    Unspecified protein-calorie malnutrition    IFG (impaired fasting glucose)    Iron deficiency anemia    AV graft malfunction, initial encounter    AV graft malfunction    Weakness        Plan     Diagnoses and all orders for this visit:    1. Squamous cell carcinoma lung, right (Primary)  -     Ambulatory Referral to Hematology / Oncology  -     ipratropium-albuterol (DUO-NEB) 0.5-2.5 mg/3 ml nebulizer; Take 3 mL by nebulization Every 6 (Six) Hours As Needed for Wheezing.  Dispense: 360 mL; Refill: 2  -     budesonide (Pulmicort) 0.5 MG/2ML nebulizer solution; Take 2 mL by nebulization 2 (Two) Times a Day.  Dispense: 60 each; Refill: 5  -     MRI Brain With & Without Contrast; Future  -     NM PET/CT Skull Base to Mid Thigh; Future  -     Ambulatory Referral to Radiation Oncology    2. ESRD (end stage renal disease) on dialysis  -     Ambulatory Referral to Hematology / Oncology  -     ipratropium-albuterol (DUO-NEB) 0.5-2.5 mg/3 ml nebulizer; Take 3 mL by nebulization Every 6 (Six) Hours As Needed for Wheezing.  Dispense: 360 mL; Refill: 2  -     budesonide  (Pulmicort) 0.5 MG/2ML nebulizer solution; Take 2 mL by nebulization 2 (Two) Times a Day.  Dispense: 60 each; Refill: 5  -     MRI Brain With & Without Contrast; Future  -     NM PET/CT Skull Base to Mid Thigh; Future  -     Ambulatory Referral to Radiation Oncology    3. Chronic obstructive pulmonary disease, unspecified COPD type  -     Ambulatory Referral to Hematology / Oncology  -     ipratropium-albuterol (DUO-NEB) 0.5-2.5 mg/3 ml nebulizer; Take 3 mL by nebulization Every 6 (Six) Hours As Needed for Wheezing.  Dispense: 360 mL; Refill: 2  -     budesonide (Pulmicort) 0.5 MG/2ML nebulizer solution; Take 2 mL by nebulization 2 (Two) Times a Day.  Dispense: 60 each; Refill: 5  -     MRI Brain With & Without Contrast; Future  -     NM PET/CT Skull Base to Mid Thigh; Future  -     Ambulatory Referral to Radiation Oncology    4. Nicotine dependence, cigarettes, in remission  -     Ambulatory Referral to Hematology / Oncology  -     ipratropium-albuterol (DUO-NEB) 0.5-2.5 mg/3 ml nebulizer; Take 3 mL by nebulization Every 6 (Six) Hours As Needed for Wheezing.  Dispense: 360 mL; Refill: 2  -     budesonide (Pulmicort) 0.5 MG/2ML nebulizer solution; Take 2 mL by nebulization 2 (Two) Times a Day.  Dispense: 60 each; Refill: 5  -     MRI Brain With & Without Contrast; Future  -     NM PET/CT Skull Base to Mid Thigh; Future  -     Ambulatory Referral to Radiation Oncology    5. Other nonspecific abnormal finding of lung field  -     NM PET/CT Skull Base to Mid Thigh; Future         Assessment & Plan  1. Squamous cell carcinoma.  The patient has been diagnosed with squamous cell carcinoma in the right lower lobe, confirmed by a CT-guided biopsy. She has undergone a resection of the right upper lobe and is currently under the care of Dr. Faulkner, with a follow-up appointment scheduled for 04/2025. An MRI of the brain and a PET scan have been ordered to check for any metastasis. She will be referred back to Dr. Mckeon in  Radiation Oncology for further evaluation and treatment planning.    2. Oxygen therapy management.  Her oxygen saturation levels are suboptimal, likely due to the use of pulse dose oxygen therapy. She will be transitioned to continuous flow oxygen therapy during her office visits. She is advised to use continuous flow oxygen when at the doctor's office and pulse dose oxygen only during transportation.    3. Medication management.  Refills for Pulmicort and DuoNeb have been provided. She is instructed to continue using Pulmicort (inhaled steroid) twice a day and DuoNeb (dual nebulizer) every 6 hours as needed. She also has a rescue inhaler for use when out and about.        Smoking status:  reports that she quit smoking about 20 years ago. Her smoking use included cigarettes. She started smoking about 73 years ago. She has a 52.5 pack-year smoking history. She has been exposed to tobacco smoke. She has never used smokeless tobacco.    Vaccination status: Reviewed  Immunization History   Administered Date(s) Administered    ABRYSVO (RSV, 60+ or pregnant women 32-36 wks) 09/19/2024    COVID-19 (PFIZER) 12YRS+ (COMIRNATY) 10/19/2023, 10/10/2024    COVID-19 (PFIZER) BIVALENT 12+YRS 10/13/2022    COVID-19 (PFIZER) Purple Cap Monovalent 03/07/2021, 04/04/2021, 10/31/2021    FLUAD TRI 65YR+ 10/10/2024    Fluad Quad 65+ 10/19/2023    Fluzone (or Fluarix & Flulaval for VFC) >6mos 09/21/2016    Fluzone High-Dose 65+YRS 10/09/2018, 10/15/2020, 10/12/2021, 09/19/2022    Hepatitis B 2 Dose Vaccine Heplisav-B 08/28/2024, 09/23/2024, 10/23/2024, 12/23/2024    Influenza Seasonal Injectable 10/01/2017, 10/01/2018    Influenza, Unspecified 10/13/2021    Pneumococcal Conjugate 13-Valent (PCV13) 10/21/2015    Pneumococcal Conjugate 20-Valent (PCV20) 08/30/2024    Pneumococcal Polysaccharide (PPSV23) 10/30/2003, 11/08/2016        Medications personally reviewed    Follow Up  Return for Next scheduled follow up.    Patient was given  instructions and counseling regarding her condition or for health maintenance advice. Please see specific information pulled into the AVS if appropriate.     I spent 15 minutes caring for Charlette Rai on this date of service. This time includes time spent by me in the following activities:preparing for the visit, reviewing tests, obtaining and/or reviewing a separately obtained history, performing a medically appropriate examination and/or evaluation, counseling and educating the patient/family/caregiver, ordering medications, tests, or procedures, documenting information in the medical record, independently interpreting results and communicating that information with the patient/family/caregiver and answered questions family members, discuss medications.

## 2025-02-06 ENCOUNTER — HOSPITAL ENCOUNTER (OUTPATIENT)
Dept: MRI IMAGING | Facility: HOSPITAL | Age: 89
Discharge: HOME | End: 2025-02-06
Payer: MEDICARE

## 2025-02-06 ENCOUNTER — OFFICE VISIT (OUTPATIENT)
Dept: ONCOLOGY | Facility: HOSPITAL | Age: 89
End: 2025-02-06
Payer: MEDICARE

## 2025-02-06 VITALS
HEART RATE: 68 BPM | SYSTOLIC BLOOD PRESSURE: 156 MMHG | WEIGHT: 142.86 LBS | HEIGHT: 65 IN | RESPIRATION RATE: 18 BRPM | DIASTOLIC BLOOD PRESSURE: 39 MMHG | TEMPERATURE: 97.5 F | OXYGEN SATURATION: 100 % | BODY MASS INDEX: 23.8 KG/M2

## 2025-02-06 DIAGNOSIS — F17.211 NICOTINE DEPENDENCE, CIGARETTES, IN REMISSION: ICD-10-CM

## 2025-02-06 DIAGNOSIS — N18.6 ESRD (END STAGE RENAL DISEASE): ICD-10-CM

## 2025-02-06 DIAGNOSIS — D63.1 ANEMIA DUE TO CHRONIC KIDNEY DISEASE, ON CHRONIC DIALYSIS: Primary | ICD-10-CM

## 2025-02-06 DIAGNOSIS — I50.32 CHRONIC DIASTOLIC CONGESTIVE HEART FAILURE: ICD-10-CM

## 2025-02-06 DIAGNOSIS — C34.91 SQUAMOUS CELL CARCINOMA LUNG, RIGHT: ICD-10-CM

## 2025-02-06 DIAGNOSIS — Z99.2 ANEMIA DUE TO CHRONIC KIDNEY DISEASE, ON CHRONIC DIALYSIS: Primary | ICD-10-CM

## 2025-02-06 DIAGNOSIS — Z99.2 ESRD (END STAGE RENAL DISEASE) ON DIALYSIS: ICD-10-CM

## 2025-02-06 DIAGNOSIS — J44.9 CHRONIC OBSTRUCTIVE PULMONARY DISEASE, UNSPECIFIED COPD TYPE: ICD-10-CM

## 2025-02-06 DIAGNOSIS — D47.2 MGUS (MONOCLONAL GAMMOPATHY OF UNKNOWN SIGNIFICANCE): ICD-10-CM

## 2025-02-06 DIAGNOSIS — N18.6 ANEMIA DUE TO CHRONIC KIDNEY DISEASE, ON CHRONIC DIALYSIS: Primary | ICD-10-CM

## 2025-02-06 DIAGNOSIS — C34.32 MALIGNANT NEOPLASM OF LOWER LOBE OF LEFT LUNG: ICD-10-CM

## 2025-02-06 DIAGNOSIS — N18.6 ESRD (END STAGE RENAL DISEASE) ON DIALYSIS: ICD-10-CM

## 2025-02-06 PROCEDURE — G0463 HOSPITAL OUTPT CLINIC VISIT: HCPCS | Performed by: INTERNAL MEDICINE

## 2025-02-06 NOTE — PROGRESS NOTES
Chief Complaint   FOLLOW UP 2    Vandana Hannah APRN Godfrey, Brennan HOLDER MD    Subjective          Charlettegerry Rai presents to [facility] for abnormal electrophoresis    Patient is a very pleasant 89-year-old female with past medical history of end-stage renal disease on Monday Wednesday Friday dialysis, coronary artery disease, chronic heart failure, peripheral vascular disease, hypertension, renal artery stenosis, COPD on 3 L of oxygen chronically who presents for abnormal serum electrophoresis.  Patient reports she has been more tired here recently.  Daughter reports that they increased her dialysis time from 3-1/2 hours to 4 hours.  Reports that since the change she has had increased fatigue.  She denies any bone pain.  Denies any weight loss or bleeding.  Was told to take iron so she got some over-the-counter iron supplement.  Workup by cardiology in July showed SHERIN with IgM monoclonal protein with kappa specificity.    7/2/24: SHERIN and SPEP ordered by cardiology for CHF: M-spike not quantifiable, SHERIN with IgM monoclonal protein with kappa specificity, kappa lambda ratio of 1.73 with elevated FLC in lambda and kappa.     9/15/24: Ca low at 8.4, Alb 3.2, TP 6.2, Creatinine 3.84, hgb 8.6, plt 138, WBC 4.74, .5.    2/1/25: Hgb 7.1, plt 112, WBC 4.04, .4, Creatinine 3.31, Ca 8.0    Interval History  Patient presents for follow-up.  She was recently discharged from 31 January after presenting on the 26th January with weakness. She states she was having trouble getting up from seated position she also has pain in her left leg vascular disease was consulted and angiogram was completed but no issues were found.  CT chest revealed new irregular 3.3 cm subpleural right lower lobe lesion.  She went for IR guided biopsy on the 31st.  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  Patient has been seen by pulmonology.  PET scan and MRI planned.  She has  follow-up with radiation oncology plan.  She denies any bleeding.  She continues on dialysis Monday Wednesday Friday.  Tolerating well.  Denies any fevers or chills.    Review of Systems   Constitutional:  Positive for fatigue. Negative for appetite change, diaphoresis, fever, unexpected weight gain and unexpected weight loss.   HENT:  Negative for hearing loss, mouth sores, sore throat, swollen glands, trouble swallowing and voice change.    Eyes:  Negative for blurred vision.   Respiratory:  Negative for cough, shortness of breath and wheezing.    Cardiovascular:  Negative for chest pain and palpitations.   Gastrointestinal:  Negative for abdominal pain, blood in stool, constipation, diarrhea, nausea and vomiting.   Endocrine: Negative for cold intolerance and heat intolerance.   Genitourinary:  Negative for difficulty urinating, dysuria, frequency, hematuria and urinary incontinence.   Musculoskeletal:  Positive for back pain. Negative for arthralgias and myalgias.   Skin:  Negative for rash, skin lesions and wound.   Neurological:  Negative for dizziness, seizures, weakness, numbness and headache.   Hematological:  Does not bruise/bleed easily.   Psychiatric/Behavioral:  Negative for depressed mood. The patient is not nervous/anxious.    All other systems reviewed and are negative.    Current Outpatient Medications on File Prior to Visit   Medication Sig Dispense Refill    acetaminophen (TYLENOL) 325 MG tablet Take 2 tablets by mouth Every 6 (Six) Hours As Needed for Mild Pain.      albuterol sulfate  (90 Base) MCG/ACT inhaler Inhale 2 puffs Every 4 (Four) Hours As Needed for Wheezing. 18 g 11    aspirin 81 MG EC tablet Take 1 tablet by mouth Daily.      budesonide (Pulmicort) 0.5 MG/2ML nebulizer solution Take 2 mL by nebulization 2 (Two) Times a Day. 60 each 5    carvedilol (COREG) 25 MG tablet Take 1 tablet by mouth 2 (Two) Times a Day With Meals. 180 tablet 3    clopidogrel (Plavix) 75 MG tablet Take  1 tablet by mouth Daily. 90 tablet 3    dexlansoprazole (DEXILANT) 60 MG capsule Take 1 capsule by mouth Daily. 90 capsule 3    ferrous sulfate 325 (65 FE) MG tablet Take 1 tablet by mouth Daily With Breakfast.      fluticasone (FLONASE) 50 MCG/ACT nasal spray Administer 1 spray into the nostril(s) as directed by provider Daily.      furosemide (LASIX) 20 MG tablet Take 1 tablet by mouth Daily. 90 tablet 3    ipratropium-albuterol (DUO-NEB) 0.5-2.5 mg/3 ml nebulizer Take 3 mL by nebulization Every 6 (Six) Hours As Needed for Wheezing. 360 mL 2    isosorbide mononitrate (IMDUR) 30 MG 24 hr tablet Take 1 tablet by mouth Every Night. 90 tablet 3    levocetirizine (XYZAL) 5 MG tablet TAKE 1 TABLET DAILY 90 tablet 1    levothyroxine (SYNTHROID, LEVOTHROID) 50 MCG tablet Take 1 tablet by mouth Daily.      lidocaine-prilocaine (EMLA) 2.5-2.5 % cream Apply 1 Application topically to the appropriate area as directed Take As Directed. Apply to dialysis access site 30-45 minutes prior to dialysis      losartan (Cozaar) 25 MG tablet Take 1 tablet by mouth Every Night. Half tab for one week then 1 tab if SBP is greater than 140 90 tablet 0    Psyllium ([employer] DAILY FIBER PO) Take 2 tablets by mouth Daily.      rosuvastatin (CRESTOR) 20 MG tablet Take 1 tablet by mouth Every Night. 90 tablet 3    sevelamer (RENVELA) 800 MG tablet Take 1 tablet by mouth 3 (Three) Times a Day With Meals. 270 tablet 3    tobramycin PF (JUAN) 300 MG/5ML nebulizer solution Take 5 mL by nebulization 2 (Two) Times a Day. 30 days on 30 days off 300 mL 6    vitamin D (ERGOCALCIFEROL) 1.25 MG (61970 UT) capsule capsule Take 1 capsule by mouth Every 14 (Fourteen) Days. (Patient taking differently: Take 1 capsule by mouth Every 14 (Fourteen) Days. The 15th and 30th of the month) 6 capsule 3     No current facility-administered medications on file prior to visit.       No Known Allergies  Past Medical History:   Diagnosis Date    Allergic rhinitis      Anaphylactic shock, unspecified, initial encounter 12/05/2023    Anemia     Arthritis     Asthma     USE INHALERS AND NEBULIZERS    Back pain     Bladder disorder     Cancer     LEFT LUNG CANCER 2005 SURGERY AND CHEMO DONE  AND CURRENTLY 4/ 14/22  RIGHT LUNG CANCER HAS ONLY RECEIVED RADIATION THUS FAR    Chronic kidney disease     stage 3    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 DENIES CP BUT DOES HAVE SOA CHRONIC ISSUE COPD/LUNG CANCER    COPD (chronic obstructive pulmonary disease)     FOLLOWED BY DR MARIAJOSE BAILEY    Coronary artery disease     DENIES CP BUT DOES GET SOA MOST OF THE TIME WITH EXERTION BUT OCC AT REST CHRONIC ISSUE COPD/LUNG CANCER    Deep vein thrombosis     Diabetes mellitus     DOES NOT CHECK BS DAILY    Disease of thyroid gland     HYPOTHYROIDISM    Essential hypertension     Gastric ulcer     GERD (gastroesophageal reflux disease)     Heart murmur     History of transfusion     NO ISSUES POST TRANSFUSION WAS MANY YEARS AGO    Hyperlipemia     Leukocytopenia     FOLLOWED BY DR MIR BAILEY    Limb swelling     Lumbago     Lumbar spinal stenosis     Lung cancer     Migraine headache     Multiple joint pain     On home oxygen therapy     4L/NC PRN    Osteopenia     PNA (pneumonia) 05/04/2024    Pseudomonas pneumonia 04/29/2024    Reflux esophagitis     Shortness of breath     Thyroid nodule     HAS ULTRASOUND YEARLY BEING MONITORED    Vascular disease     Vitamin D deficiency      Past Surgical History:   Procedure Laterality Date    ABDOMINAL SURGERY      ANGIOPLASTY RENAL ARTERY Left 06/14/2024    stent placement    APPENDECTOMY      ARTERIOGRAM N/A 6/14/2024    Procedure: Arteriogram, right radial access, aortogram and renal arteriogram with possible intervention.;  Surgeon: Dio Miguel MD;  Location: [facility] INVASIVE LOCATION;   Service: Peripheral Vascular;  Laterality: N/A;    ARTERIOVENOUS FISTULA/SHUNT SURGERY Left 05/10/2022    Procedure: Left basilic vein transposition;  Surgeon: Wan Barksdale MD;  Location: [facility] MAIN OR;  Service: Vascular;  Laterality: Left;    ARTERIOVENOUS FISTULA/SHUNT SURGERY Left 2023    Procedure: Ligation of left arm arteriovenous fistula;  Surgeon: Wan Barksdale MD;  Location: [facility] MAIN OR;  Service: Vascular;  Laterality: Left;    ARTERIOVENOUS FISTULA/SHUNT SURGERY Left 2023    Procedure: Creation of left arm arteriovenous graft;  Surgeon: Wan Barksdale MD;  Location: [facility] MAIN OR;  Service: Vascular;  Laterality: Left;    BRONCHOSCOPY N/A 2024    Procedure: BRONCHOSCOPY WITH BAL AND WASHINGS;  Surgeon: Khalif Yanez MD;  Location: [facility] ENDOSCOPY;  Service: Pulmonary;  Laterality: N/A;  MUCUS PLUGGING    CARDIAC CATHETERIZATION      CARDIAC SURGERY      CARDIAC SURGERY      fluid drained from heart    CATARACT EXTRACTION, BILATERAL      COLONOSCOPY      ENDOSCOPY  2016    FEMORAL ARTERY STENT Bilateral     HYSTERECTOMY      LUNG BIOPSY Left     lobectomy upper lung caner    LUNG VOLUME REDUCTION      OTHER SURGICAL HISTORY      artifical joints/limbs    REPLACEMENT TOTAL KNEE Left     SHUNT O GRAM N/A 2025    Procedure: dialysis shuntogram;  Surgeon: Court Valente MD;  Location: [facility] CATH INVASIVE LOCATION;  Service: Peripheral Vascular;  Laterality: N/A;    UPPER GASTROINTESTINAL ENDOSCOPY       Social History     Socioeconomic History    Marital status:    Tobacco Use    Smoking status: Former     Current packs/day: 0.00     Average packs/day: 1 pack/day for 52.5 years (52.5 ttl pk-yrs)     Types: Cigarettes     Start date:      Quit date: 2004     Years since quittin.6     Passive exposure: Past    Smokeless tobacco: Never   Vaping Use    Vaping status: Never Used   Substance and Sexual Activity    Alcohol use:  "Not Currently     Comment: beer many years ago    Drug use: Never    Sexual activity: Defer     Family History   Problem Relation Age of Onset    Arthritis Mother     Arthritis Father     Cancer Brother     Diabetes Brother     Other Brother         blood disease     Prostate cancer Brother     Cancer Brother     Prostate cancer Brother     Cancer Brother     Cancer Brother     Malig Hyperthermia Neg Hx     Colon cancer Neg Hx        Objective   Physical Exam  Well appearing elderly patient in no acute distress NC in place  Anicteric sclerae, no rash on exposed skin  Respirations non-labored  Awake, alert, and oriented x 4. Speech intact. No gross neurologic deficit  Appropriate mood and affect    Vitals:    02/06/25 1112   Temp: 97.5 °F (36.4 °C)   TempSrc: Temporal   Weight: 64.8 kg (142 lb 13.7 oz)   Height: 165.1 cm (65\")   PainSc:   7   PainLoc: Leg  Comment: bilateral     ECOG score: 0         PHQ-9 Total Score:              Result Review :   The following data was reviewed by: Brennan Medrano MD on 02/06/25:  Lab Results   Component Value Date    HGB 7.1 (L) 02/01/2025    HCT 24.8 (L) 02/01/2025    .4 (H) 02/01/2025     (L) 02/01/2025    WBC 4.04 02/01/2025    NEUTROABS 2.70 01/30/2025    LYMPHSABS 0.61 (L) 01/30/2025    MONOSABS 0.80 01/30/2025    EOSABS 0.15 01/30/2025    BASOSABS 0.01 01/30/2025     Lab Results   Component Value Date    GLUCOSE 87 02/01/2025    BUN 22 02/01/2025    CREATININE 3.31 (H) 02/01/2025     (L) 02/01/2025    K 4.1 02/01/2025     02/01/2025    CO2 23.3 02/01/2025    CALCIUM 8.0 (L) 02/01/2025    PROTEINTOT 5.8 (L) 01/26/2025    ALBUMIN 2.7 (L) 02/01/2025    BILITOT 0.2 01/26/2025    ALKPHOS 292 (H) 01/26/2025    AST 25 01/26/2025    ALT 14 01/26/2025     Lab Results   Component Value Date    MG 2.0 01/30/2025    PHOS 3.9 02/01/2025    FREET4 1.05 10/31/2024    TSH 1.800 10/31/2024     Labs personally reviewed and notable for anemia.  SHERIN with IgM " monoclonal protein with kappa specificity.  Kappa lambda ratio elevated at 1.7 but this is normal for GFR.  Creatinine elevated.  Calcium low. Anemia worse.        Renal note personally reviewed  Pulmonary note personally reviewed  1/2025 Discharge summary personally reviewed    XR Chest 1 View    Result Date: 1/31/2025  Impression: 1.Haziness throughout the left hemithorax remains. There is volume loss left lung which has been noted.. A small left effusion not excluded. 2.Probable atelectatic changes right lung. 3.Scarring right upper lobe. Electronically Signed: Tony Kurtz MD  1/31/2025 3:59 PM EST  Workstation ID: [id number]    XR Chest 1 View    Result Date: 1/31/2025  Impression: 1.No evidence of right-sided pneumothorax status post lung biopsy. 2.Again seen is opacity throughout the left lung presumably from pleural fluid, atelectasis and posttreatment changes. Electronically Signed: Reno Contreras MD  1/31/2025 11:56 AM EST  Workstation ID: [id number]    CT Needle Biopsy Lung    Result Date: 1/31/2025  Impression: 1.Successful CT-guided percutaneous core needle lung biopsy of right lower lobe lung mass without evidence of complication. Electronically Signed: Ildefonso Sanches MD  1/31/2025 11:20 AM EST  Workstation ID: [id number]    CT Chest Without Contrast Diagnostic    Result Date: 1/28/2025     1. There is a new irregular 3.3 cm subpleural opacity in the right lower lobe. It may be partially cavitary. A malignant process is possible. Consider biopsy for further evaluation. Nuclear Medicine (NM) whole-body FDG-PET-CT scan may be helpful in further evaluation, as well.  2. Please see above comments for further detail.    Portions of this note were completed with a voice recognition program.  Electronically Signed By-Chicho Jones MD On:1/28/2025 4:46 AM      CT Angio Abdominal Aorta Bilateral Iliofem Runoff    Result Date: 1/27/2025  Impression: 1. Chronic bilateral SFA occlusions stable since 2020.  Bilateral distal at the level of the popliteal arteries through chronic developed collaterals, with three-vessel runoff to the level of the ankle bilaterally. 2. Severe intra-abdominal atherosclerotic disease including high-grade stenosis of proximal celiac artery, proximal SMA and proximal right renal artery. Mild ectatic dilation of the infrarenal abdominal aorta. All vascular stents in the abdomen are patent. No evidence of solid organ or hollow viscus ischemia. 3. Stable left common femoral artery pseudoaneurysm since 5/31/2024. 4. Masslike right lower lobe consolidation which could reflect a focal pneumonia versus potential neoplasm in patient with prior history of lung malignancy. Consider pulmonary consultation for further management. 5. Findings suggesting volume overload/third spacing including small right pleural effusion, interstitial pulmonary edema and right lower lobe groundglass suspicious for early alveolar edema versus nonspecific infectious/inflammatory process. Body wall and bilateral lower extremity edema, left greater than right. 6. Cholelithiasis. 7. Numerous additional chronic/ancillary findings as above. Electronically Signed: Ildefonso Purcell MD  1/27/2025 1:11 PM EST  Workstation ID: [id number]    XR Hip With or Without Pelvis 2 - 3 View Left    Result Date: 1/27/2025  No acute fracture or acute malalignment is identified.   Portions of this note were completed with a voice recognition program.   Electronically Signed By-Chicho Jones MD On:1/27/2025 5:15 AM      XR Chest 1 View    Result Date: 1/13/2025  Impression: 1.Right IJ tunneled dialysis catheter with tip at the high right atrial level. No pneumothorax. 2.Persistent nodular consolidation in the right midlung. 3.Left-sided volume loss and opacities appear similar to prior. Electronically Signed: Carlton Mcnamara MD  1/13/2025 4:08 PM EST  Workstation ID: [id number]    XR Chest 1 View    Result Date: 1/13/2025  Impression: 1.Posttreatment  changes and volume loss in the left, and to a lesser extent the right chest. 2.No acute cardiopulmonary disease. Electronically Signed: Justus Sal MD  1/13/2025 2:04 PM EST  Workstation ID: [id number]         Assessment and Plan    Diagnoses and all orders for this visit:    1. Anemia due to chronic kidney disease, on chronic dialysis (Primary)  -     CBC & Differential; Future  -     Ferritin; Future  -     Iron Profile; Future  -     Vitamin B12; Future  -     Folate; Future    2. Malignant neoplasm of lower lobe of left lung    3. ESRD (end stage renal disease)    4. Chronic diastolic congestive heart failure    5. MGUS (monoclonal gammopathy of unknown significance)        IgM MGUS  M spike too low to quantify.  Normal kappa lambda ratio for patient's GFR.  End-stage renal disease very likely not related to the MGUS.  Anemia very likely not related to MGUS.  Total protein normal.  Calcium actually low.  Does not meet criteria for bone marrow biopsy or skeletal imaging at this time.  Plan to repeat these labs around 7/2025 due to low risk MGUS.     Anemia  Hgb less than 10.0 g/dL, actually borderline on needing transfusion. Likely secondary to renal disease.  Recommend EPO injections - currently managed by nephrology.  Ferritin elevated >1000. Iron studies consistent with anemia of chronic disease. Due to age, hesitant to do bone marrow. Normal WBC but does have chronic thrombocytopenia.     Right lung squamous cell  Hx of RUL lobectomy. RLL. 3.3 cm. IR guided biopsy positive. Not surgical candidate. MRI brain and PET pending. Refer to rad onc for possible SBRT.     ESRD  MWF dialysis per renal.     CHFpEF  Follow up with cardiology.          Patient Follow Up: 2 months  Patient was given instructions and counseling regarding her condition or for health maintenance advice. Please see specific information pulled into the AVS if appropriate.

## 2025-02-12 ENCOUNTER — READMISSION MANAGEMENT (OUTPATIENT)
Dept: CALL CENTER | Facility: HOSPITAL | Age: 89
End: 2025-02-12
Payer: MEDICARE

## 2025-02-12 ENCOUNTER — HOSPITAL ENCOUNTER (OUTPATIENT)
Dept: PET IMAGING | Facility: HOSPITAL | Age: 89
Discharge: HOME | End: 2025-02-12
Payer: MEDICARE

## 2025-02-12 ENCOUNTER — HOSPITAL ENCOUNTER (OUTPATIENT)
Dept: MRI IMAGING | Facility: HOSPITAL | Age: 89
Discharge: HOME | End: 2025-02-12
Payer: MEDICARE

## 2025-02-12 DIAGNOSIS — Z99.2 ESRD (END STAGE RENAL DISEASE) ON DIALYSIS: ICD-10-CM

## 2025-02-12 DIAGNOSIS — N18.6 ESRD (END STAGE RENAL DISEASE) ON DIALYSIS: ICD-10-CM

## 2025-02-12 DIAGNOSIS — R91.8 OTHER NONSPECIFIC ABNORMAL FINDING OF LUNG FIELD: ICD-10-CM

## 2025-02-12 DIAGNOSIS — F17.211 NICOTINE DEPENDENCE, CIGARETTES, IN REMISSION: ICD-10-CM

## 2025-02-12 DIAGNOSIS — C34.91 SQUAMOUS CELL CARCINOMA LUNG, RIGHT: ICD-10-CM

## 2025-02-12 DIAGNOSIS — J44.9 CHRONIC OBSTRUCTIVE PULMONARY DISEASE, UNSPECIFIED COPD TYPE: ICD-10-CM

## 2025-02-12 PROCEDURE — 70553 MRI BRAIN STEM W/O & W/DYE: CPT

## 2025-02-12 PROCEDURE — 78815 PET IMAGE W/CT SKULL-THIGH: CPT

## 2025-02-12 PROCEDURE — A9577 INJ MULTIHANCE: HCPCS | Performed by: NURSE PRACTITIONER

## 2025-02-12 PROCEDURE — 25510000002 GADOBENATE DIMEGLUMINE 529 MG/ML SOLUTION: Performed by: NURSE PRACTITIONER

## 2025-02-12 PROCEDURE — 34310000005 FLUDEOXYGLUCOSE F18 SOLUTION: Performed by: NURSE PRACTITIONER

## 2025-02-12 PROCEDURE — A9552 F18 FDG: HCPCS | Performed by: NURSE PRACTITIONER

## 2025-02-12 RX ADMIN — FLUDEOXYGLUCOSE F 18 1 DOSE: 200 INJECTION, SOLUTION INTRAVENOUS at 08:45

## 2025-02-12 RX ADMIN — GADOBENATE DIMEGLUMINE 10 ML: 529 INJECTION, SOLUTION INTRAVENOUS at 10:50

## 2025-02-12 NOTE — OUTREACH NOTE
Medical Week 2 Survey      Flowsheet Row Responses   [facility] patient discharged from? [facility]   Does the patient have one of the following disease processes/diagnoses(primary or secondary)? Other   Week 2 attempt successful? Yes   Call start time 1617   Discharge diagnosis Weakness   Call end time 1620   Meds reviewed with patient/caregiver? Yes   Is the patient having any side effects they believe may be caused by any medication additions or changes? No   Does the patient have all medications ordered at discharge? Yes   Is the patient taking all medications as directed (includes completed medication regime)? Yes   Does the patient have a primary care provider?  Yes   Does the patient have an appointment with their PCP within 7 days of discharge? Greater than 7 days   What is preventing the patient from scheduling follow up appointments within 7 days of discharge? Earlier appointment not available   Nursing Interventions Verified appointment date/time/provider   Has the patient kept scheduled appointments due by today? Yes  [tests and dialysis]   Has home health visited the patient within 72 hours of discharge? Yes   Home health comments HH did not call back when pt called, has been in dialysis, labs, will have visit later in week   Psychosocial issues? No   Did the patient receive a copy of their discharge instructions? Yes   Nursing interventions Reviewed instructions with patient   What is the patient's perception of their health status since discharge? Improving   Is the patient/caregiver able to teach back signs and symptoms related to disease process for when to call PCP? Yes   Is the patient/caregiver able to teach back signs and symptoms related to disease process for when to call 911? Yes   Is the patient/caregiver able to teach back the hierarchy of who to call/visit for symptoms/problems? PCP, Specialist, Home health nurse, Urgent Care, ED, 911 Yes   Additional teach back comments left leg weaker  than right   Week 2 Call Completed? Yes   Call end time [id number]            Jennifer BHAKTA - Registered Nurse

## 2025-02-17 ENCOUNTER — HOSPITAL ENCOUNTER (OUTPATIENT)
Dept: RADIATION ONCOLOGY | Facility: HOSPITAL | Age: 89
Setting detail: RADIATION/ONCOLOGY SERIES
End: 2025-02-17
Payer: MEDICARE

## 2025-02-17 ENCOUNTER — CONSULT (OUTPATIENT)
Dept: RADIATION ONCOLOGY | Facility: HOSPITAL | Age: 89
End: 2025-02-17
Payer: MEDICARE

## 2025-02-17 ENCOUNTER — HOSPITAL ENCOUNTER (OUTPATIENT)
Dept: RADIATION ONCOLOGY | Facility: HOSPITAL | Age: 89
Discharge: HOME | End: 2025-02-17
Payer: MEDICARE

## 2025-02-17 VITALS
OXYGEN SATURATION: 98 % | DIASTOLIC BLOOD PRESSURE: 51 MMHG | BODY MASS INDEX: 23.77 KG/M2 | SYSTOLIC BLOOD PRESSURE: 153 MMHG | TEMPERATURE: 98.1 F | RESPIRATION RATE: 20 BRPM | WEIGHT: 142.86 LBS | HEART RATE: 80 BPM

## 2025-02-17 DIAGNOSIS — C34.91 SQUAMOUS CELL CARCINOMA LUNG, RIGHT: ICD-10-CM

## 2025-02-17 DIAGNOSIS — N18.6 ESRD (END STAGE RENAL DISEASE) ON DIALYSIS: ICD-10-CM

## 2025-02-17 DIAGNOSIS — J44.9 CHRONIC OBSTRUCTIVE PULMONARY DISEASE, UNSPECIFIED COPD TYPE: ICD-10-CM

## 2025-02-17 DIAGNOSIS — C34.31 PRIMARY SQUAMOUS CELL CARCINOMA OF LOWER LOBE OF RIGHT LUNG: Primary | ICD-10-CM

## 2025-02-17 DIAGNOSIS — C34.31 MALIGNANT NEOPLASM OF LOWER LOBE, RIGHT BRONCHUS OR LUNG: ICD-10-CM

## 2025-02-17 DIAGNOSIS — Z99.2 ESRD (END STAGE RENAL DISEASE) ON DIALYSIS: ICD-10-CM

## 2025-02-17 DIAGNOSIS — F17.211 NICOTINE DEPENDENCE, CIGARETTES, IN REMISSION: ICD-10-CM

## 2025-02-17 LAB
CYTO UR: NORMAL
LAB AP CASE REPORT: NORMAL
LAB AP CLINICAL INFORMATION: NORMAL
LAB AP DIAGNOSIS COMMENT: NORMAL
LAB AP SPECIAL STAINS: NORMAL
Lab: NORMAL
PATH REPORT.ADDENDUM SPEC: NORMAL
PATH REPORT.FINAL DX SPEC: NORMAL
PATH REPORT.GROSS SPEC: NORMAL

## 2025-02-17 PROCEDURE — 77334 RADIATION TREATMENT AID(S): CPT | Performed by: RADIOLOGY

## 2025-02-17 PROCEDURE — 77470 SPECIAL RADIATION TREATMENT: CPT | Performed by: RADIOLOGY

## 2025-02-17 PROCEDURE — 77263 THER RADIOLOGY TX PLNG CPLX: CPT | Performed by: RADIOLOGY

## 2025-02-17 PROCEDURE — G0463 HOSPITAL OUTPT CLINIC VISIT: HCPCS | Performed by: RADIOLOGY

## 2025-02-17 NOTE — PROGRESS NOTES
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 [C34.31]   Cancer Staging: Cancer Staging   Malignant neoplasm of lower lobe of left lung  Staging form: Lung, AJCC V9  - Clinical: cT2, cN0, cM0 - Signed by Brennan Medrano MD on 2/6/2025      Chief Complaint:    Chief Complaint   Patient presents with    Consult    Lung Cancer       History of Present Illness: Charlette Rai is an 89-year-old female with history of non-small cell lung cancer status post left upper lobe lobectomy in 2005 followed by chemotherapy with subsequent SBRT to a right upper lobe nodule in 2021 who is seen in consultation regarding radiotherapy for a right lower lobe nodule that is biopsy-proven squamous cell carcinoma.  Ms. Rai underwent left upper lobe lobectomy in 2005 per Dr. Kimble.  She subsequently received chemotherapy due to pT2 pN1 disease.  Upon surveillance imaging a right upper lobe mass was appreciated.  PET/CT from 4/29/2021 revealed a 2 cm lesion in the left upper lobe that was FDG avid consistent with primary lung malignancy.  She completed SBRT with subsequent good radiographic response.  She has experienced an enlarging right lower lobe nodule.  Pathology from biopsy on 1/31/2025 revealed squamous cell carcinoma.  PET/CT on 2/12/2025 revealed a 2.6 x 2.2 cm irregular nodule in the right lower lobe abutting the pleura with central cavitation with max SUV of 19.47.  MRI of the brain with and without contrast on 2/12/2025 revealed no evidence of intracranial metastatic disease.  Ms. Rai reports now using 4 L/min supplemental oxygen with marked dyspnea on mild exertion.  She denies chest pain, headaches, vision changes, hemoptysis or weight loss.    Subjective      Review of Systems: Review of Systems   Constitutional:   Positive for appetite change (decreased), fatigue (8/10) and unexpected weight change (10lb wt loss in last few months).   HENT:  Positive for tinnitus. Negative for sore throat and trouble swallowing.    Eyes:  Positive for visual disturbance (wears glasses).   Respiratory:  Positive for shortness of breath (with any activity on 4L O2). Negative for cough.    Cardiovascular:  Positive for palpitations (occasional). Negative for chest pain and leg swelling.   Gastrointestinal:  Positive for nausea (occasional). Negative for blood in stool, constipation, diarrhea and rectal pain.   Genitourinary:  Negative for dysuria, frequency, hematuria and urgency.        Dialysis MWF  Can urinate 2-3 times per day      Musculoskeletal:  Positive for arthralgias, back pain and gait problem (ambulates with roller walker).   Skin:  Negative for color change and rash.   Neurological:  Positive for dizziness (with postural changes) and weakness (generalized). Negative for headaches.   Hematological:  Bruises/bleeds easily.   Psychiatric/Behavioral:  Negative for agitation, self-injury, sleep disturbance and suicidal ideas.        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 DENIES CP BUT DOES HAVE SOA CHRONIC ISSUE COPD/LUNG CANCER    COPD (chronic obstructive pulmonary disease)     FOLLOWED BY DR MARIAJOSE BAILEY    Coronary artery disease     DENIES CP  BUT DOES GET SOA MOST OF THE TIME WITH EXERTION BUT OCC AT REST CHRONIC ISSUE COPD/LUNG CANCER    Deep vein thrombosis     Diabetes mellitus     DOES NOT CHECK BS DAILY    Disease of thyroid gland     HYPOTHYROIDISM    Essential hypertension     Gastric ulcer     GERD (gastroesophageal reflux disease)     Heart murmur     History of transfusion     NO ISSUES POST TRANSFUSION WAS MANY YEARS AGO    Hyperlipemia     Leukocytopenia     FOLLOWED BY DR MIR BAILEY    Limb swelling     Lumbago     Lumbar spinal stenosis     Lung cancer     Migraine headache     Multiple joint pain     On home oxygen therapy     4L/NC PRN    Osteopenia     PNA (pneumonia) 05/04/2024    Pseudomonas pneumonia 04/29/2024    Reflux esophagitis     Shortness of breath     Thyroid nodule     HAS ULTRASOUND YEARLY BEING MONITORED    Vascular disease     Vitamin D deficiency        Past Surgical History:   Past Surgical History:   Procedure Laterality Date    ABDOMINAL SURGERY      ANGIOPLASTY RENAL ARTERY Left 06/14/2024    stent placement    APPENDECTOMY      ARTERIOGRAM N/A 6/14/2024    Procedure: Arteriogram, right radial access, aortogram and renal arteriogram with possible intervention.;  Surgeon: Dio Miguel MD;  Location: [facility] CATH INVASIVE LOCATION;  Service: Peripheral Vascular;  Laterality: N/A;    ARTERIOVENOUS FISTULA/SHUNT SURGERY Left 05/10/2022    Procedure: Left basilic vein transposition;  Surgeon: Wan Barksdale MD;  Location: [facility] MAIN OR;  Service: Vascular;  Laterality: Left;    ARTERIOVENOUS FISTULA/SHUNT SURGERY Left 03/23/2023    Procedure: Ligation of left arm arteriovenous fistula;  Surgeon: Wan Barksdale MD;  Location: [facility] MAIN OR;  Service: Vascular;  Laterality: Left;    ARTERIOVENOUS FISTULA/SHUNT SURGERY Left 11/30/2023    Procedure: Creation of left arm arteriovenous graft;  Surgeon: Wan Barksdale MD;  Location: [facility] MAIN OR;  Service: Vascular;  Laterality: Left;    BRONCHOSCOPY N/A 04/24/2024     Procedure: BRONCHOSCOPY WITH BAL AND WASHINGS;  Surgeon: Khalif Yanez MD;  Location: [facility] ENDOSCOPY;  Service: Pulmonary;  Laterality: N/A;  MUCUS PLUGGING    CARDIAC CATHETERIZATION      CARDIAC SURGERY      CARDIAC SURGERY      fluid drained from heart    CATARACT EXTRACTION, BILATERAL      COLONOSCOPY      ENDOSCOPY  2016    FEMORAL ARTERY STENT Bilateral     HYSTERECTOMY      LUNG BIOPSY Left 2005    lobectomy upper lung caner    LUNG VOLUME REDUCTION      OTHER SURGICAL HISTORY      artifical joints/limbs    REPLACEMENT TOTAL KNEE Left 2016    SHUNT O GRAM N/A 2025    Procedure: dialysis shuntogram;  Surgeon: Court Valente MD;  Location: [facility] CATH INVASIVE LOCATION;  Service: Peripheral Vascular;  Laterality: N/A;    UPPER GASTROINTESTINAL ENDOSCOPY         Family History:   Family History   Problem Relation Age of Onset    Arthritis Mother     Arthritis Father     Cancer Brother     Diabetes Brother     Other Brother         blood disease     Prostate cancer Brother     Cancer Brother     Prostate cancer Brother     Cancer Brother     Cancer Brother     Malig Hyperthermia Neg Hx     Colon cancer Neg Hx        Social History:   Social History     Socioeconomic History    Marital status:    Tobacco Use    Smoking status: Former     Current packs/day: 0.00     Average packs/day: 1 pack/day for 52.5 years (52.5 ttl pk-yrs)     Types: Cigarettes     Start date:      Quit date: 2004     Years since quittin.6     Passive exposure: Past    Smokeless tobacco: Never   Vaping Use    Vaping status: Never Used   Substance and Sexual Activity    Alcohol use: Not Currently     Comment: beer many years ago    Drug use: Never    Sexual activity: Defer       Medications:     Current Outpatient Medications:     acetaminophen (TYLENOL) 325 MG tablet, Take 2 tablets by mouth Every 6 (Six) Hours As Needed for Mild Pain., Disp: , Rfl:     aspirin 81 MG EC tablet, Take 1 tablet  by mouth Daily., Disp: , Rfl:     carvedilol (COREG) 25 MG tablet, Take 1 tablet by mouth 2 (Two) Times a Day With Meals., Disp: 180 tablet, Rfl: 3    clopidogrel (Plavix) 75 MG tablet, Take 1 tablet by mouth Daily., Disp: 90 tablet, Rfl: 3    dexlansoprazole (DEXILANT) 60 MG capsule, Take 1 capsule by mouth Daily., Disp: 90 capsule, Rfl: 3    ferrous sulfate 325 (65 FE) MG tablet, Take 1 tablet by mouth Daily With Breakfast., Disp: , Rfl:     fluticasone (FLONASE) 50 MCG/ACT nasal spray, Administer 1 spray into the nostril(s) as directed by provider Daily., Disp: , Rfl:     furosemide (LASIX) 20 MG tablet, Take 1 tablet by mouth Daily., Disp: 90 tablet, Rfl: 3    isosorbide mononitrate (IMDUR) 30 MG 24 hr tablet, Take 1 tablet by mouth Every Night., Disp: 90 tablet, Rfl: 3    levocetirizine (XYZAL) 5 MG tablet, TAKE 1 TABLET DAILY, Disp: 90 tablet, Rfl: 1    levothyroxine (SYNTHROID, LEVOTHROID) 50 MCG tablet, Take 1 tablet by mouth Daily., Disp: , Rfl:     losartan (Cozaar) 25 MG tablet, Take 1 tablet by mouth Every Night. Half tab for one week then 1 tab if SBP is greater than 140, Disp: 90 tablet, Rfl: 0    Psyllium ([employer] DAILY FIBER PO), Take 2 tablets by mouth Daily., Disp: , Rfl:     rosuvastatin (CRESTOR) 20 MG tablet, Take 1 tablet by mouth Every Night., Disp: 90 tablet, Rfl: 3    sevelamer (RENVELA) 800 MG tablet, Take 1 tablet by mouth 3 (Three) Times a Day With Meals., Disp: 270 tablet, Rfl: 3    tobramycin PF (JUAN) 300 MG/5ML nebulizer solution, Take 5 mL by nebulization 2 (Two) Times a Day. 30 days on 30 days off, Disp: 300 mL, Rfl: 6    vitamin D (ERGOCALCIFEROL) 1.25 MG (35982 UT) capsule capsule, Take 1 capsule by mouth Every 14 (Fourteen) Days. (Patient taking differently: Take 1 capsule by mouth Every 14 (Fourteen) Days. The 15th and 30th of the month), Disp: 6 capsule, Rfl: 3    albuterol sulfate  (90 Base) MCG/ACT inhaler, Inhale 2 puffs Every 4 (Four) Hours As Needed for Wheezing.  (Patient not taking: Reported on 2/17/2025), Disp: 18 g, Rfl: 11    budesonide (Pulmicort) 0.5 MG/2ML nebulizer solution, Take 2 mL by nebulization 2 (Two) Times a Day. (Patient not taking: Reported on 2/17/2025), Disp: 60 each, Rfl: 5    ipratropium-albuterol (DUO-NEB) 0.5-2.5 mg/3 ml nebulizer, Take 3 mL by nebulization Every 6 (Six) Hours As Needed for Wheezing. (Patient not taking: Reported on 2/17/2025), Disp: 360 mL, Rfl: 2    lidocaine-prilocaine (EMLA) 2.5-2.5 % cream, Apply 1 Application topically to the appropriate area as directed Take As Directed. Apply to dialysis access site 30-45 minutes prior to dialysis (Patient not taking: Reported on 2/17/2025), Disp: , Rfl:     Allergies:   No Known Allergies    Pain: (on a scale of 0-10)   Pain Score    02/17/25 1448   PainSc:   7   PainLoc: Back       Charlette Rai reports a pain score of 7.  Given her pain assessment as noted, treatment options were discussed and the following options were decided upon as a follow-up plan to address the patient's pain: continuation of current treatment plan for pain.     Advanced Care Plan: N   Quality of Life: 50 - Needs A Walker    Objective     Physical Exam:   Vital Signs:   Vitals:    02/17/25 1448   BP: 153/51   Pulse: 80   Resp: 20   Temp: 98.1 °F (36.7 °C)   TempSrc: Temporal   SpO2: 98%   Weight: 64.8 kg (142 lb 13.7 oz)   PainSc:   7   PainLoc: Back     Body mass index is 23.77 kg/m².     Physical Exam  Constitutional:       General: She is not in acute distress.     Appearance: Normal appearance. She is normal weight. She is not toxic-appearing.      Comments: Sitting upright with nasal cannula in place   Pulmonary:      Effort: Pulmonary effort is normal. No respiratory distress.   Skin:     General: Skin is warm and dry.      Coloration: Skin is not jaundiced.      Findings: No lesion.   Neurological:      General: No focal deficit present.      Mental Status: She is alert and oriented to person, place, and  time.      Cranial Nerves: No cranial nerve deficit.   Psychiatric:         Mood and Affect: Mood normal.         Judgment: Judgment normal.         Results:   Radiographs: NM PET/CT Skull Base to Mid Thigh    Result Date: 2/14/2025  Impression: 1.2.6 x 2.2 cm irregular nodule in the right lower lobe abutting the pleura with mild cavitation is markedly hypermetabolic up to 19.47 SUV max. This is compatible with the patient's known primary lung cancer. No hypermetabolic or enlarged thoracic lymph  nodes. No evidence of metastatic disease in the neck, abdomen, pelvis or osseous structures. 2.Stable treatment related changes in the right upper lobe and left lung. Electronically Signed: Michael Velazco DO  2/14/2025 4:55 PM EST  Workstation ID: [id number]    MRI Brain With & Without Contrast    Result Date: 2/12/2025  Impression: 1.No definite evidence for metastatic disease. 2.T2 signal change involving the cerebral hemispheres and milton that could reflect more chronic small vessel ischemic change. 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 There is volume loss left lung which has been noted.. A small left effusion not excluded. 2.Probable atelectatic changes right lung. 3.Scarring right upper lobe. Electronically Signed: Tony Kurtz MD  1/31/2025 3:59 PM EST  Workstation ID: [id number]    XR Chest 1 View    Result Date: 1/31/2025  Impression: 1.No evidence of right-sided pneumothorax status post lung biopsy. 2.Again seen is opacity throughout the left lung presumably from pleural fluid, atelectasis and posttreatment changes. Electronically Signed: Reno Contreras MD  1/31/2025 11:56 AM EST  Workstation ID: [id number]    CT Needle Biopsy Lung    Result Date:  1/31/2025  Impression: 1.Successful CT-guided percutaneous core needle lung biopsy of right lower lobe lung mass without evidence of complication. Electronically Signed: Ildefonso Sanches MD  1/31/2025 11:20 AM EST  Workstation ID: [id number]    CT Chest Without Contrast Diagnostic    Result Date: 1/28/2025     1. There is a new irregular 3.3 cm subpleural opacity in the right lower lobe. It may be partially cavitary. A malignant process is possible. Consider biopsy for further evaluation. Nuclear Medicine (NM) whole-body FDG-PET-CT scan may be helpful in further evaluation, as well.  2. Please see above comments for further detail.    Portions of this note were completed with a voice recognition program.  Electronically Signed By-Chicho Jones MD On:1/28/2025 4:46 AM      CT Angio Abdominal Aorta Bilateral Iliofem Runoff    Result Date: 1/27/2025  Impression: 1. Chronic bilateral SFA occlusions stable since 2020. Bilateral distal at the level of the popliteal arteries through chronic developed collaterals, with three-vessel runoff to the level of the ankle bilaterally. 2. Severe intra-abdominal atherosclerotic disease including high-grade stenosis of proximal celiac artery, proximal SMA and proximal right renal artery. Mild ectatic dilation of the infrarenal abdominal aorta. All vascular stents in the abdomen are patent. No evidence of solid organ or hollow viscus ischemia. 3. Stable left common femoral artery pseudoaneurysm since 5/31/2024. 4. Masslike right lower lobe consolidation which could reflect a focal pneumonia versus potential neoplasm in patient with prior history of lung malignancy. Consider pulmonary consultation for further management. 5. Findings suggesting volume overload/third spacing including small right pleural effusion, interstitial pulmonary edema and right lower lobe groundglass suspicious for early alveolar edema versus nonspecific infectious/inflammatory process. Body wall and bilateral  lower extremity edema, left greater than right. 6. Cholelithiasis. 7. Numerous additional chronic/ancillary findings as above. Electronically Signed: Ildefonso Purcell MD  1/27/2025 1:11 PM EST  Workstation ID: [id number]    XR Hip With or Without Pelvis 2 - 3 View Left    Result Date: 1/27/2025  No acute fracture or acute malalignment is identified.   Portions of this note were completed with a voice recognition program.   Electronically Signed By-Chicho Jones MD On:1/27/2025 5:15 AM     I personally reviewed the PET/CT and MRI brain with and without contrast from 2/12/2025.  The pertinent findings are as above in the HPI.    Pathology: I personally reviewed the pathology report from the procedure performed on 1/31/2025.  The pertinent findings are as above in HPI.    Labs:   WBC   Date Value Ref Range Status   02/01/2025 4.04 3.40 - 10.80 10*3/mm3 Final     Hemoglobin   Date Value Ref Range Status   02/12/2025 9.4 (L) 12.0 - 16.0 g/dL Final   02/12/2025 28.2 (L) 36.0 - 48.0 % Final     Hematocrit   Date Value Ref Range Status   02/01/2025 24.8 (L) 34.0 - 46.6 % Final     Platelets   Date Value Ref Range Status   02/01/2025 112 (L) 140 - 450 10*3/mm3 Final       Assessment / Plan      Assessment/Plan:   Charlette Rai is an 89-year-old female with history of pT2 pN1 non-small cell carcinoma of the left upper lobe status post lobectomy in 2005 with subsequent right upper lobe presumed non-small cell lung cancer for which she underwent SBRT in 2021.  She now has a right lower lobe squamous cell carcinoma; cT2a cN0.  ECOG 2    I discussed the clinical, radiographic and pathologic findings to date with Ms. Rai.  I explained the role of radiotherapy in the definitive management of early-stage lung malignancy for the medically inoperable or those who decline surgery.  I recommended stereotactic body radiotherapy to the right lower lobe nodule, outlining the risks, potential benefits and alternatives of this approach.  We  specifically discussed the increased risk for pulmonary damage given her prior history of radiotherapy.  Ms. Rai voiced her understanding and is agreeable to SBRT.  She will undergo 4D CT simulation today for treatment planning purposes.      Regis Mckeon MD  Radiation Oncology  [facility]    This document has been signed by Regis Mckeon MD on February 17, 2025 16:13 EST

## 2025-02-18 ENCOUNTER — LAB (OUTPATIENT)
Dept: LAB | Facility: HOSPITAL | Age: 89
End: 2025-02-18
Payer: MEDICARE

## 2025-02-18 ENCOUNTER — HOSPITAL ENCOUNTER (OUTPATIENT)
Dept: GENERAL RADIOLOGY | Facility: HOSPITAL | Age: 89
Discharge: HOME | End: 2025-02-18
Payer: MEDICARE

## 2025-02-18 DIAGNOSIS — Z98.62 S/P RENAL ARTERY ANGIOPLASTY: ICD-10-CM

## 2025-02-18 DIAGNOSIS — I35.0 AORTIC STENOSIS, MODERATE: ICD-10-CM

## 2025-02-18 DIAGNOSIS — Z99.2 END STAGE RENAL DISEASE ON DIALYSIS: ICD-10-CM

## 2025-02-18 DIAGNOSIS — I50.32 CHRONIC HEART FAILURE WITH PRESERVED EJECTION FRACTION: ICD-10-CM

## 2025-02-18 DIAGNOSIS — E03.9 HYPOTHYROIDISM (ACQUIRED): ICD-10-CM

## 2025-02-18 DIAGNOSIS — R73.01 IFG (IMPAIRED FASTING GLUCOSE): ICD-10-CM

## 2025-02-18 DIAGNOSIS — Z99.81 OXYGEN DEPENDENT: ICD-10-CM

## 2025-02-18 DIAGNOSIS — K21.9 GASTROESOPHAGEAL REFLUX DISEASE WITHOUT ESOPHAGITIS: ICD-10-CM

## 2025-02-18 DIAGNOSIS — J96.22 ACUTE ON CHRONIC RESPIRATORY FAILURE WITH HYPOXIA AND HYPERCAPNIA: ICD-10-CM

## 2025-02-18 DIAGNOSIS — E55.9 VITAMIN D DEFICIENCY: ICD-10-CM

## 2025-02-18 DIAGNOSIS — R54 FRAILTY SYNDROME IN GERIATRIC PATIENT: ICD-10-CM

## 2025-02-18 DIAGNOSIS — Z99.2 ESRD (END STAGE RENAL DISEASE) ON DIALYSIS: ICD-10-CM

## 2025-02-18 DIAGNOSIS — N18.6 END STAGE RENAL DISEASE ON DIALYSIS: ICD-10-CM

## 2025-02-18 DIAGNOSIS — J96.21 ACUTE ON CHRONIC RESPIRATORY FAILURE WITH HYPOXIA AND HYPERCAPNIA: ICD-10-CM

## 2025-02-18 DIAGNOSIS — N18.6 ESRD (END STAGE RENAL DISEASE) ON DIALYSIS: ICD-10-CM

## 2025-02-18 DIAGNOSIS — E78.5 HYPERLIPIDEMIA LDL GOAL <70: ICD-10-CM

## 2025-02-18 DIAGNOSIS — T82.590D MALFUNCTION OF ARTERIOVENOUS GRAFT, SUBSEQUENT ENCOUNTER: ICD-10-CM

## 2025-02-18 DIAGNOSIS — I10 ESSENTIAL HYPERTENSION: ICD-10-CM

## 2025-02-18 DIAGNOSIS — E83.39 HYPERPHOSPHATEMIA: ICD-10-CM

## 2025-02-18 LAB
ALBUMIN SERPL-MCNC: 3.2 G/DL (ref 3.5–5.2)
ALBUMIN/GLOB SERPL: 0.9 G/DL
ALP SERPL-CCNC: 305 U/L (ref 39–117)
ALT SERPL W P-5'-P-CCNC: 13 U/L (ref 1–33)
ANION GAP SERPL CALCULATED.3IONS-SCNC: 8.2 MMOL/L (ref 5–15)
AST SERPL-CCNC: 23 U/L (ref 1–32)
BASOPHILS # BLD AUTO: 0.04 10*3/MM3 (ref 0–0.2)
BASOPHILS NFR BLD AUTO: 0.8 % (ref 0–1.5)
BILIRUB CONJ SERPL-MCNC: 0.1 MG/DL (ref 0–0.3)
BILIRUB SERPL-MCNC: 0.3 MG/DL (ref 0–1.2)
BUN SERPL-MCNC: 22 MG/DL (ref 8–23)
BUN/CREAT SERPL: 5.5 (ref 7–25)
CALCIUM SPEC-SCNC: 8.8 MG/DL (ref 8.6–10.5)
CHLORIDE SERPL-SCNC: 101 MMOL/L (ref 98–107)
CHOLEST SERPL-MCNC: 110 MG/DL (ref 0–200)
CO2 SERPL-SCNC: 33.8 MMOL/L (ref 22–29)
CREAT SERPL-MCNC: 3.99 MG/DL (ref 0.57–1)
DEPRECATED RDW RBC AUTO: 50.1 FL (ref 37–54)
EGFRCR SERPLBLD CKD-EPI 2021: 10.4 ML/MIN/1.73
EOSINOPHIL # BLD AUTO: 0.14 10*3/MM3 (ref 0–0.4)
EOSINOPHIL NFR BLD AUTO: 2.6 % (ref 0.3–6.2)
ERYTHROCYTE [DISTWIDTH] IN BLOOD BY AUTOMATED COUNT: 13.7 % (ref 12.3–15.4)
GLOBULIN UR ELPH-MCNC: 3.5 GM/DL
GLUCOSE SERPL-MCNC: 88 MG/DL (ref 65–99)
HCT VFR BLD AUTO: 33 % (ref 34–46.6)
HDLC SERPL-MCNC: 65 MG/DL (ref 40–60)
HGB BLD-MCNC: 10.3 G/DL (ref 12–15.9)
IMM GRANULOCYTES # BLD AUTO: 0.02 10*3/MM3 (ref 0–0.05)
IMM GRANULOCYTES NFR BLD AUTO: 0.4 % (ref 0–0.5)
LDLC SERPL CALC-MCNC: 31 MG/DL (ref 0–100)
LDLC/HDLC SERPL: 0.5 {RATIO}
LYMPHOCYTES # BLD AUTO: 0.59 10*3/MM3 (ref 0.7–3.1)
LYMPHOCYTES NFR BLD AUTO: 11.1 % (ref 19.6–45.3)
MAGNESIUM SERPL-MCNC: 2.5 MG/DL (ref 1.6–2.4)
MCH RBC QN AUTO: 31.2 PG (ref 26.6–33)
MCHC RBC AUTO-ENTMCNC: 31.2 G/DL (ref 31.5–35.7)
MCV RBC AUTO: 100 FL (ref 79–97)
MONOCYTES # BLD AUTO: 0.83 10*3/MM3 (ref 0.1–0.9)
MONOCYTES NFR BLD AUTO: 15.6 % (ref 5–12)
NEUTROPHILS NFR BLD AUTO: 3.7 10*3/MM3 (ref 1.7–7)
NEUTROPHILS NFR BLD AUTO: 69.5 % (ref 42.7–76)
NRBC BLD AUTO-RTO: 0 /100 WBC (ref 0–0.2)
NT-PROBNP SERPL-MCNC: ABNORMAL PG/ML (ref 0–1800)
PLATELET # BLD AUTO: 152 10*3/MM3 (ref 140–450)
PMV BLD AUTO: 11.2 FL (ref 6–12)
POTASSIUM SERPL-SCNC: 4.5 MMOL/L (ref 3.5–5.2)
PROT SERPL-MCNC: 6.7 G/DL (ref 6–8.5)
RBC # BLD AUTO: 3.3 10*6/MM3 (ref 3.77–5.28)
SODIUM SERPL-SCNC: 143 MMOL/L (ref 136–145)
TRIGL SERPL-MCNC: 61 MG/DL (ref 0–150)
VLDLC SERPL-MCNC: 14 MG/DL (ref 5–40)
WBC NRBC COR # BLD AUTO: 5.32 10*3/MM3 (ref 3.4–10.8)

## 2025-02-18 PROCEDURE — 36415 COLL VENOUS BLD VENIPUNCTURE: CPT

## 2025-02-18 PROCEDURE — 85025 COMPLETE CBC W/AUTO DIFF WBC: CPT

## 2025-02-18 PROCEDURE — 83735 ASSAY OF MAGNESIUM: CPT

## 2025-02-18 PROCEDURE — 71045 X-RAY EXAM CHEST 1 VIEW: CPT

## 2025-02-18 PROCEDURE — 80053 COMPREHEN METABOLIC PANEL: CPT

## 2025-02-18 PROCEDURE — 83880 ASSAY OF NATRIURETIC PEPTIDE: CPT

## 2025-02-18 PROCEDURE — 82248 BILIRUBIN DIRECT: CPT

## 2025-02-18 PROCEDURE — 80061 LIPID PANEL: CPT

## 2025-02-19 ENCOUNTER — TELEPHONE (OUTPATIENT)
Dept: CARDIOLOGY | Facility: CLINIC | Age: 89
End: 2025-02-19
Payer: MEDICARE

## 2025-02-19 DIAGNOSIS — E83.41 HYPERMAGNESEMIA: Primary | ICD-10-CM

## 2025-02-19 NOTE — TELEPHONE ENCOUNTER
MINI patient regarding results and recommendations. Voiced understanding.     Patient denies any SOA/edema. Advised to notify office if develops.

## 2025-02-19 NOTE — TELEPHONE ENCOUNTER
----- Message from Jazzy Addison sent at 2/19/2025  9:24 AM EST -----  Lipid panel is good  BNP is elevated, find out if having any shortness of breath/edema  Magnesium is mildly elevated, avoid foods high in Mg and repeat in 4 weeks  Renal function has remained about the same  Sodium and potassium are good  Continue current meds

## 2025-02-21 ENCOUNTER — READMISSION MANAGEMENT (OUTPATIENT)
Dept: CALL CENTER | Facility: HOSPITAL | Age: 89
End: 2025-02-21
Payer: MEDICARE

## 2025-02-21 LAB
RAD ONC ARIA COURSE END DATE: NORMAL
RAD ONC ARIA COURSE ID: NORMAL
RAD ONC ARIA COURSE INTENT: NORMAL
RAD ONC ARIA COURSE LAST TREATMENT DATE: NORMAL
RAD ONC ARIA COURSE START DATE: NORMAL
RAD ONC ARIA COURSE TREATMENT ELAPSED DAYS: 9
RAD ONC ARIA FIRST TREATMENT DATE: NORMAL
RAD ONC ARIA PLAN FRACTIONS TREATED TO DATE: 5
RAD ONC ARIA PLAN ID: NORMAL
RAD ONC ARIA PLAN NAME: NORMAL
RAD ONC ARIA PLAN PRESCRIBED DOSE PER FRACTION: 11 GY
RAD ONC ARIA PLAN PRIMARY REFERENCE POINT: NORMAL
RAD ONC ARIA PLAN TOTAL FRACTIONS PRESCRIBED: 5
RAD ONC ARIA PLAN TOTAL PRESCRIBED DOSE: 5500 CGY
RAD ONC ARIA REFERENCE POINT DOSAGE GIVEN TO DATE: 55 GY
RAD ONC ARIA REFERENCE POINT ID: NORMAL

## 2025-02-21 NOTE — OUTREACH NOTE
Medical Week 3 Survey      Flowsheet Row Responses   [facility] patient discharged from? [facility]   Does the patient have one of the following disease processes/diagnoses(primary or secondary)? Other   Week 3 attempt successful? Yes   Call start time 1526   Call end time 1534   Discharge diagnosis Weakness   Meds reviewed with patient/caregiver? Yes   Is the patient having any side effects they believe may be caused by any medication additions or changes? No   Does the patient have all medications ordered at discharge? N/A   Prescription comments pt has used supply of norco since discharge, using OTC pain relievers.   Is the patient taking all medications as directed (includes completed medication regime)? Yes   Does the patient have a primary care provider?  Yes   Does the patient have an appointment with their PCP within 7 days of discharge? Greater than 7 days   Has the patient kept scheduled appointments due by today? Yes   Comments Pt has next appt with PCP on 3/18/25, HF Clinic on 2/25, and starts Radiation on 2/25/25   What is the Home health agency?  [facility]   Has home health visited the patient within 72 hours of discharge? No   Home health interventions Called Home Health agency   Home health comments Pt reports  did not visit since discharge and reports not needed now as she starts radiation next week.  This RN did call  agency to discuss and reports they did make contact with pt but were never able to arrange scheduling with pt   Psychosocial issues? No   Did the patient receive a copy of their discharge instructions? Yes   Nursing interventions Reviewed instructions with patient   What is the patient's perception of their health status since discharge? Same  [reports still some pain to left leg, has a walker fo rmobility.  Pt compliant with HD and starts radiaiton next week.]   Is the patient/caregiver able to teach back signs and symptoms related to disease process for when to call PCP?  Yes   Is the patient/caregiver able to teach back signs and symptoms related to disease process for when to call 911? Yes   Week 3 Call Completed? Yes   Graduated Yes   Call end time 1530            CECILLE SAGASTUME - Registered Nurse

## 2025-02-24 PROCEDURE — 77338 DESIGN MLC DEVICE FOR IMRT: CPT | Performed by: RADIOLOGY

## 2025-02-24 PROCEDURE — 77300 RADIATION THERAPY DOSE PLAN: CPT | Performed by: RADIOLOGY

## 2025-02-24 PROCEDURE — 77293 RESPIRATOR MOTION MGMT SIMUL: CPT | Performed by: RADIOLOGY

## 2025-02-24 PROCEDURE — 77301 RADIOTHERAPY DOSE PLAN IMRT: CPT | Performed by: RADIOLOGY

## 2025-02-25 ENCOUNTER — EDUCATION (OUTPATIENT)
Dept: RADIATION ONCOLOGY | Facility: HOSPITAL | Age: 89
End: 2025-02-25
Payer: MEDICARE

## 2025-02-25 ENCOUNTER — HOSPITAL ENCOUNTER (OUTPATIENT)
Dept: RADIATION ONCOLOGY | Facility: HOSPITAL | Age: 89
Discharge: HOME | End: 2025-02-25

## 2025-02-25 ENCOUNTER — HOSPITAL ENCOUNTER (OUTPATIENT)
Facility: HOSPITAL | Age: 89
Discharge: HOME | End: 2025-02-25
Payer: MEDICARE

## 2025-02-25 VITALS
WEIGHT: 140 LBS | BODY MASS INDEX: 23.32 KG/M2 | SYSTOLIC BLOOD PRESSURE: 140 MMHG | DIASTOLIC BLOOD PRESSURE: 64 MMHG | HEART RATE: 89 BPM | HEIGHT: 65 IN

## 2025-02-25 VITALS
SYSTOLIC BLOOD PRESSURE: 172 MMHG | DIASTOLIC BLOOD PRESSURE: 60 MMHG | TEMPERATURE: 98.1 F | OXYGEN SATURATION: 94 % | RESPIRATION RATE: 16 BRPM | HEART RATE: 62 BPM

## 2025-02-25 DIAGNOSIS — Z99.2 END STAGE RENAL DISEASE ON DIALYSIS: ICD-10-CM

## 2025-02-25 DIAGNOSIS — C34.31 MALIGNANT NEOPLASM OF LOWER LOBE, RIGHT BRONCHUS OR LUNG: Primary | ICD-10-CM

## 2025-02-25 DIAGNOSIS — I10 ESSENTIAL HYPERTENSION: ICD-10-CM

## 2025-02-25 DIAGNOSIS — E78.5 HYPERLIPIDEMIA LDL GOAL <70: ICD-10-CM

## 2025-02-25 DIAGNOSIS — N18.6 END STAGE RENAL DISEASE ON DIALYSIS: ICD-10-CM

## 2025-02-25 DIAGNOSIS — I50.32 CHRONIC HEART FAILURE WITH PRESERVED EJECTION FRACTION: Primary | ICD-10-CM

## 2025-02-25 LAB
RAD ONC ARIA COURSE ID: NORMAL
RAD ONC ARIA COURSE INTENT: NORMAL
RAD ONC ARIA COURSE LAST TREATMENT DATE: NORMAL
RAD ONC ARIA COURSE START DATE: NORMAL
RAD ONC ARIA COURSE TREATMENT ELAPSED DAYS: 0
RAD ONC ARIA FIRST TREATMENT DATE: NORMAL
RAD ONC ARIA PLAN FRACTIONS TREATED TO DATE: 1
RAD ONC ARIA PLAN ID: NORMAL
RAD ONC ARIA PLAN PRESCRIBED DOSE PER FRACTION: 11 GY
RAD ONC ARIA PLAN PRIMARY REFERENCE POINT: NORMAL
RAD ONC ARIA PLAN TOTAL FRACTIONS PRESCRIBED: 5
RAD ONC ARIA PLAN TOTAL PRESCRIBED DOSE: 5500 CGY
RAD ONC ARIA REFERENCE POINT DOSAGE GIVEN TO DATE: 11 GY
RAD ONC ARIA REFERENCE POINT ID: NORMAL
RAD ONC ARIA REFERENCE POINT SESSION DOSAGE GIVEN: 11 GY

## 2025-02-25 PROCEDURE — G0463 HOSPITAL OUTPT CLINIC VISIT: HCPCS

## 2025-02-25 PROCEDURE — 77373 STRTCTC BDY RAD THER TX DLVR: CPT | Performed by: RADIOLOGY

## 2025-02-25 NOTE — PROGRESS NOTES
Office Visit    Chief Complaint  Chronic HFpEF    Subjective            Charlettetenzin Rai presents to [facility]  History of Present Illness  Ms. Rai is an 89-year-old female who presented to the office today for follow-up due to heart failure with preserved ejection fraction, hypertension, hyperlipidemia, end-stage renal disease on dialysis Monday Wednesday Friday.  Admitted to the hospital for lower extremity edema.  She has been doing better since her hospitalization although there has been some confusion with her medications.  She spironolactone, amlodipine and losartan were all discontinued at discharge.  Patient's pressures remain elevated.  She is currently taking losartan 25 mg a half a tablet daily.  She denies any chest pain, orthopnea or syncopal episodes.      Past Medical History:   Diagnosis Date    Allergic rhinitis     Anaphylactic shock, unspecified, initial encounter 12/05/2023    Anemia     Arthritis     Asthma     USE INHALERS AND NEBULIZERS    Back pain     Bladder disorder     Cancer     LEFT LUNG CANCER 2005 SURGERY AND CHEMO DONE  AND CURRENTLY 4/ 14/22  RIGHT LUNG CANCER HAS ONLY RECEIVED RADIATION THUS FAR    Chronic kidney disease     stage 3    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 DENIES CP BUT DOES HAVE SOA CHRONIC ISSUE COPD/LUNG CANCER    COPD (chronic obstructive pulmonary disease)     FOLLOWED BY DR MARIAJOSE BAILEY    Coronary artery disease     DENIES CP BUT DOES GET SOA MOST OF THE TIME WITH EXERTION BUT OCC AT REST CHRONIC ISSUE COPD/LUNG CANCER    Deep vein thrombosis     Diabetes mellitus     DOES NOT CHECK BS DAILY    Disease of thyroid gland     HYPOTHYROIDISM    Essential hypertension     Gastric ulcer     GERD (gastroesophageal reflux disease)     Heart murmur     History of  transfusion     NO ISSUES POST TRANSFUSION WAS MANY YEARS AGO    Hyperlipemia     Leukocytopenia     FOLLOWED BY DR MIR BAILEY    Limb swelling     Lumbago     Lumbar spinal stenosis     Lung cancer     Migraine headache     Multiple joint pain     On home oxygen therapy     4L/NC PRN    Osteopenia     PNA (pneumonia) 05/04/2024    Pseudomonas pneumonia 04/29/2024    Reflux esophagitis     Shortness of breath     Thyroid nodule     HAS ULTRASOUND YEARLY BEING MONITORED    Vascular disease     Vitamin D deficiency        No Known Allergies     Past Surgical History:   Procedure Laterality Date    ABDOMINAL SURGERY      ANGIOPLASTY RENAL ARTERY Left 06/14/2024    stent placement    APPENDECTOMY      ARTERIOGRAM N/A 6/14/2024    Procedure: Arteriogram, right radial access, aortogram and renal arteriogram with possible intervention.;  Surgeon: Dio Miguel MD;  Location: [facility] CATH INVASIVE LOCATION;  Service: Peripheral Vascular;  Laterality: N/A;    ARTERIOVENOUS FISTULA/SHUNT SURGERY Left 05/10/2022    Procedure: Left basilic vein transposition;  Surgeon: Wan Barksdale MD;  Location: [facility] MAIN OR;  Service: Vascular;  Laterality: Left;    ARTERIOVENOUS FISTULA/SHUNT SURGERY Left 03/23/2023    Procedure: Ligation of left arm arteriovenous fistula;  Surgeon: Wan Barksdale MD;  Location: [facility] MAIN OR;  Service: Vascular;  Laterality: Left;    ARTERIOVENOUS FISTULA/SHUNT SURGERY Left 11/30/2023    Procedure: Creation of left arm arteriovenous graft;  Surgeon: Wan Barksdale MD;  Location: [facility] MAIN OR;  Service: Vascular;  Laterality: Left;    BRONCHOSCOPY N/A 04/24/2024    Procedure: BRONCHOSCOPY WITH BAL AND WASHINGS;  Surgeon: Khalif Yanez MD;  Location: [facility] ENDOSCOPY;  Service: Pulmonary;  Laterality: N/A;  MUCUS PLUGGING    CARDIAC CATHETERIZATION  1996    CARDIAC SURGERY      CARDIAC SURGERY      fluid drained from heart    CATARACT EXTRACTION, BILATERAL  2003    COLONOSCOPY  2014    ENDOSCOPY   2016    FEMORAL ARTERY STENT Bilateral     HYSTERECTOMY      LUNG BIOPSY Left 2005    lobectomy upper lung caner    LUNG VOLUME REDUCTION      OTHER SURGICAL HISTORY      artifical joints/limbs    REPLACEMENT TOTAL KNEE Left     SHUNT O GRAM N/A 2025    Procedure: dialysis shuntogram;  Surgeon: Court Valente MD;  Location: [facility] INVASIVE LOCATION;  Service: Peripheral Vascular;  Laterality: N/A;    UPPER GASTROINTESTINAL ENDOSCOPY          Social History     Tobacco Use    Smoking status: Former     Current packs/day: 0.00     Average packs/day: 1 pack/day for 52.5 years (52.5 ttl pk-yrs)     Types: Cigarettes     Start date:      Quit date: 2004     Years since quittin.6     Passive exposure: Past    Smokeless tobacco: Never   Vaping Use    Vaping status: Never Used   Substance Use Topics    Alcohol use: Not Currently     Comment: beer many years ago    Drug use: Never       Family History   Problem Relation Age of Onset    Arthritis Mother     Arthritis Father     Cancer Brother     Diabetes Brother     Other Brother         blood disease     Prostate cancer Brother     Cancer Brother     Prostate cancer Brother     Cancer Brother     Cancer Brother     Malig Hyperthermia Neg Hx     Colon cancer Neg Hx         Prior to Admission medications    Medication Sig Start Date End Date Taking? Authorizing Provider   acetaminophen (TYLENOL) 325 MG tablet Take 2 tablets by mouth Every 6 (Six) Hours As Needed for Mild Pain.    Provider, MD Sarah   albuterol sulfate  (90 Base) MCG/ACT inhaler Inhale 2 puffs Every 4 (Four) Hours As Needed for Wheezing.  Patient not taking: Reported on 2025   Laura Franco APRN   aspirin 81 MG EC tablet Take 1 tablet by mouth Daily.    Provider, MD Sarah   budesonide (Pulmicort) 0.5 MG/2ML nebulizer solution Take 2 mL by nebulization 2 (Two) Times a Day.  Patient not taking: Reported on 2025   Vandana Hannah,  DAT   carvedilol (COREG) 25 MG tablet Take 1 tablet by mouth 2 (Two) Times a Day With Meals. 9/24/24   Halima Quick APRN   clopidogrel (Plavix) 75 MG tablet Take 1 tablet by mouth Daily. 6/15/24 6/15/25  Dio Miguel MD   dexlansoprazole (DEXILANT) 60 MG capsule Take 1 capsule by mouth Daily. 8/5/24   Brennan Mosqueda MD   ferrous sulfate 325 (65 FE) MG tablet Take 1 tablet by mouth Daily With Breakfast.    ProviderSarah MD   fluticasone (FLONASE) 50 MCG/ACT nasal spray Administer 1 spray into the nostril(s) as directed by provider Daily. 9/12/24   Sarah Parker MD   furosemide (LASIX) 20 MG tablet Take 1 tablet by mouth Daily. 6/18/24   Halima Quick APRN   ipratropium-albuterol (DUO-NEB) 0.5-2.5 mg/3 ml nebulizer Take 3 mL by nebulization Every 6 (Six) Hours As Needed for Wheezing.  Patient not taking: Reported on 2/17/2025 2/4/25   Vandana Hannah APRN   isosorbide mononitrate (IMDUR) 30 MG 24 hr tablet Take 1 tablet by mouth Every Night. 9/17/24   Jazzy Addison APRN   levocetirizine (XYZAL) 5 MG tablet TAKE 1 TABLET DAILY 6/23/23   Paloma James MD   levothyroxine (SYNTHROID, LEVOTHROID) 50 MCG tablet Take 1 tablet by mouth Daily. 7/8/24   Sarah Parker MD   lidocaine-prilocaine (EMLA) 2.5-2.5 % cream Apply 1 Application topically to the appropriate area as directed Take As Directed. Apply to dialysis access site 30-45 minutes prior to dialysis  Patient not taking: Reported on 2/17/2025 9/13/24   Sarah Parker MD   losartan (Cozaar) 25 MG tablet Take 1 tablet by mouth Every Night. Half tab for one week then 1 tab if SBP is greater than 140 2/4/25   Halima Quick APRN   Psyllium ([facility] DAILY FIBER PO) Take 2 tablets by mouth Daily.    Sarah Parker MD   rosuvastatin (CRESTOR) 20 MG tablet Take 1 tablet by mouth Every Night. 9/17/24   Jazzy Addison APRN   sevelamer (RENVELA) 800 MG tablet Take 1 tablet by mouth 3 (Three) Times a  "Day With Meals. 11/19/24   Brennan Mosqueda MD   tobramycin PF (JUAN) 300 MG/5ML nebulizer solution Take 5 mL by nebulization 2 (Two) Times a Day. 30 days on 30 days off 9/12/24   Laura Franco APRN   vitamin D (ERGOCALCIFEROL) 1.25 MG (42681 UT) capsule capsule Take 1 capsule by mouth Every 14 (Fourteen) Days.  Patient taking differently: Take 1 capsule by mouth Every 14 (Fourteen) Days. The 15th and 30th of the month 10/24/24   Brennan Mosqueda MD        Review of Systems   Constitutional:  Positive for fatigue.   Respiratory:  Positive for shortness of breath. Negative for cough.    Cardiovascular:  Positive for leg swelling. Negative for chest pain and palpitations.   Neurological:  Positive for dizziness.        Symptom Course: Improved    Weight Trend: Stable     Objective     /64   Pulse 89   Ht 165.1 cm (65\")   Wt 63.5 kg (140 lb)   BMI 23.30 kg/m²       Physical Exam  Constitutional:       General: She is awake.      Appearance: Normal appearance.   Neck:      Thyroid: No thyromegaly.      Vascular: No carotid bruit or JVD.   Cardiovascular:      Rate and Rhythm: Normal rate and regular rhythm.      Chest Wall: PMI is not displaced.      Pulses: Normal pulses.      Heart sounds: Normal heart sounds, S1 normal and S2 normal. No murmur heard.     No friction rub. No gallop. No S3 or S4 sounds.   Pulmonary:      Effort: Pulmonary effort is normal.      Breath sounds: Normal breath sounds and air entry. No wheezing, rhonchi or rales.   Abdominal:      General: Bowel sounds are normal.      Palpations: Abdomen is soft. There is no mass.      Tenderness: There is no abdominal tenderness.   Musculoskeletal:      Cervical back: Neck supple.      Right lower leg: No edema.      Left lower leg: No edema.   Neurological:      Mental Status: She is alert and oriented to person, place, and time.   Psychiatric:         Mood and Affect: Mood normal.         Behavior: Behavior is cooperative. "           Result Review :                      Lab Results   Component Value Date    PROBNP 22,279.0 (H) 02/18/2025    PROBNP 28,306.0 (H) 01/14/2025    PROBNP 15,166.0 (H) 10/31/2024     09/09/2019     04/11/2019     CMP          1/31/2025    04:48 2/1/2025    05:30 2/18/2025    10:11   CMP   Glucose 99  87  88    BUN 30  22  22    Creatinine 4.60  3.31  3.99    EGFR 8.8  13.0  10.4    Sodium 135  135  143    Potassium 4.0  4.1  4.5    Chloride 102  103  101    Calcium 7.6  8.0  8.8    Total Protein   6.7    Albumin 2.6  2.7  3.2    Globulin   3.5    Total Bilirubin   0.3    Alkaline Phosphatase   305    AST (SGOT)   23    ALT (SGPT)   13    Albumin/Globulin Ratio   0.9    BUN/Creatinine Ratio 6.5  6.6  5.5    Anion Gap 9.8  8.7  8.2      CBC w/diff          2/5/2025    00:00 2/12/2025    00:00 2/18/2025    10:11   CBC w/Diff   WBC   5.32    RBC   3.30    Hemoglobin 7.7        23.1     9.4        28.2     10.3    Hematocrit   33.0    MCV   100.0    MCH   31.2    MCHC   31.2    RDW   13.7    Platelets   152    Neutrophil Rel %   69.5    Immature Granulocyte Rel %   0.4    Lymphocyte Rel %   11.1    Monocyte Rel %   15.6    Eosinophil Rel %   2.6    Basophil Rel %   0.8       Details          This result is from an external source.              Lipid Panel          6/27/2024    09:46 8/6/2024    09:26 2/18/2025    10:11   Lipid Panel   Total Cholesterol 126  94  110    Triglycerides 55  69  61    HDL Cholesterol 88  54  65    VLDL Cholesterol 12  15  14    LDL Cholesterol  26  25  31    LDL/HDL Ratio 0.31  0.49  0.50       Lab Results   Component Value Date    TSH 1.800 10/31/2024    TSH 0.724 08/06/2024    TSH 7.250 (H) 06/27/2024      Lab Results   Component Value Date    FREET4 1.05 10/31/2024    FREET4 1.40 08/06/2024    FREET4 1.28 06/27/2024      D-Dimer, Quantitative   Date Value Ref Range Status   03/13/2024 4.30 (H) 0.00 - 0.87 MCGFEU/mL Final     Magnesium   Date Value Ref Range Status  "  02/18/2025 2.5 (H) 1.6 - 2.4 mg/dL Final      No results found for: \"DIGOXIN\"   A1C Last 3 Results          10/31/2024    11:00 1/13/2025    13:29 1/15/2025    00:00   HGBA1C Last 3 Results   Hemoglobin A1C 5.20  4.80  5.5          Details          This result is from an external source.                  Results for orders placed during the hospital encounter of 08/21/24    Adult Transthoracic Echo Limited W/ Cont if Necessary Per Protocol    Interpretation Summary    Left ventricular systolic function is low normal. Left ventricular ejection fraction appears to be 51 - 55%.    Left ventricular wall thickness is consistent with mild concentric hypertrophy.    The left atrial cavity is mild to moderately dilated.    Aortic valve is moderately calcified.  Mild to moderate aortic valve stenosis is present.  The peak and mean gradient across aortic valve are 21/15 mmHg with a calculated valve area of 1.64 cm².    Mitral valve apparatus is calcified with restricted opening.  Possible mitral stenosis, unable to assess severity.  Adequate Doppler studies not obtained across the mitral valve.        Assessment and Plan        Diagnoses and all orders for this visit:    1. Chronic heart failure with preserved ejection fraction (Primary)  Assessment & Plan:  Patient has heart failure with preserved ejection fraction.  She continues to experience dyspnea with mild exertion and lower extremity edema.  She continues to utilize O2 via nasal cannula.  She is unable to utilize an SGLT2 or hydralazine due to episodes of dizziness.  Will make the following changes to her heart failure medications:    1.  Increase losartan 25 mg take a full tablet at bedtime.  2.  Take Lasix/furosemide 20 mg take an extra tablet on Thursday for increased edema.  3.  Continue rest of medications as prescribed.  4.  Do blood work prior to next visit.      2. Essential hypertension  Assessment & Plan:  Patient's blood pressures are elevated per her " home log.  Will increase losartan to a full tablet at night.  I have asked her to continue to keep a heart rate blood pressure and weight log and bring it to her next appointment.      3. Hyperlipidemia LDL goal <70  Assessment & Plan:   Patient utilizes Crestor 20 mg daily, continue the same.      4. End stage renal disease on dialysis  Assessment & Plan:  Patient has end-stage renal disease on hemodialysis Monday Wednesday and Friday.  She reports that they pull off 3.2 L of fluid on her appointment Monday.  She continues to experience volume overload,  short of air with minimal exertion and bilateral lower extremity edema.              Follow Up     Return for Follow-up with Paloma.    Patient was given instructions and counseling regarding her condition or for health maintenance advice. Please see specific information pulled into the AVS if appropriate.     HEART FAIL CLIN [facility]   02/25/25 12:37 EST    Patient is a chronic health condition that requires close follow-up    Dictation done with Dragon    Electronically signed by DAT Dexter, 02/25/25, 3:15 PM EST.

## 2025-02-25 NOTE — PROGRESS NOTES
"PATIENT NAME: Charlette Rai    MRN: [id number]    DX/TX Area: Lung    CURRENT FRACTIONS AT TEACHIN/5    EDUCATION GIVEN:    Patient education performed today in clinic.  Education folder with handouts given:    --- Radiation Therapy contact numbers for Main Line, Nurse Line, Treatment area,  and Dietician with instruction on when to use each one.   --- Radiation Therapy packet with site specific information.   --- Radiation Therapy bulleted points  --- Radiation Oncology skin care  --- Fatigue/Energy Conservation Technique Guidelines  --- Short and Long Term Symptoms Management \"Bubble\" Sheet (Lung)    Discussed all handouts. Time given for patient to ask questions.  All questions answered to patient satisfaction.  No further needs or concerns at this time.     OTHER:   Ms. Rai with prior history of SBRT. No further needs at this time.  "

## 2025-02-25 NOTE — PATIENT INSTRUCTIONS
1.  Increase losartan 25 mg take a full tablet at bedtime.  2.  Take Lasix/furosemide 20 mg take an extra tablet on Thursday for increased edema.  3.  Continue rest of medications as prescribed.  4.  Do blood work prior to next visit.

## 2025-02-25 NOTE — ASSESSMENT & PLAN NOTE
Patient's blood pressures are elevated per her home log.  Will increase losartan to a full tablet at night.  I have asked her to continue to keep a heart rate blood pressure and weight log and bring it to her next appointment.

## 2025-02-25 NOTE — PROGRESS NOTES
Stereotactic Body Radiotherapy Note    02/25/2025        Treatment Site: [facility]  Energy: 6X  Dose:1100  #Fx:1  Start Date:2/25/25  Elapsed Days:0      [Associated problem not found]  Cancer Staging   cT2, cN0, cM0     Current Outpatient Medications on File Prior to Encounter   Medication Sig    acetaminophen (TYLENOL) 325 MG tablet Take 2 tablets by mouth Every 6 (Six) Hours As Needed for Mild Pain.    albuterol sulfate  (90 Base) MCG/ACT inhaler Inhale 2 puffs Every 4 (Four) Hours As Needed for Wheezing.    aspirin 81 MG EC tablet Take 1 tablet by mouth Daily.    budesonide (Pulmicort) 0.5 MG/2ML nebulizer solution Take 2 mL by nebulization 2 (Two) Times a Day.    carvedilol (COREG) 25 MG tablet Take 1 tablet by mouth 2 (Two) Times a Day With Meals.    clopidogrel (Plavix) 75 MG tablet Take 1 tablet by mouth Daily.    dexlansoprazole (DEXILANT) 60 MG capsule Take 1 capsule by mouth Daily.    ferrous sulfate 325 (65 FE) MG tablet Take 1 tablet by mouth Daily With Breakfast.    fluticasone (FLONASE) 50 MCG/ACT nasal spray Administer 1 spray into the nostril(s) as directed by provider Daily.    furosemide (LASIX) 20 MG tablet Take 1 tablet by mouth Daily.    ipratropium-albuterol (DUO-NEB) 0.5-2.5 mg/3 ml nebulizer Take 3 mL by nebulization Every 6 (Six) Hours As Needed for Wheezing.    isosorbide mononitrate (IMDUR) 30 MG 24 hr tablet Take 1 tablet by mouth Every Night.    levocetirizine (XYZAL) 5 MG tablet TAKE 1 TABLET DAILY    levothyroxine (SYNTHROID, LEVOTHROID) 50 MCG tablet Take 1 tablet by mouth Daily.    lidocaine-prilocaine (EMLA) 2.5-2.5 % cream Apply 1 Application topically to the appropriate area as directed Take As Directed. Apply to dialysis access site 30-45 minutes prior to dialysis    losartan (Cozaar) 25 MG tablet Take 1 tablet by mouth Every Night. Half tab for one week then 1 tab if SBP is greater than 140    Psyllium ([facility] DAILY FIBER PO) Take 2 tablets by mouth Daily.    rosuvastatin  (CRESTOR) 20 MG tablet Take 1 tablet by mouth Every Night.    sevelamer (RENVELA) 800 MG tablet Take 1 tablet by mouth 3 (Three) Times a Day With Meals.    tobramycin PF (JUAN) 300 MG/5ML nebulizer solution Take 5 mL by nebulization 2 (Two) Times a Day. 30 days on 30 days off    vitamin D (ERGOCALCIFEROL) 1.25 MG (31715 UT) capsule capsule Take 1 capsule by mouth Every 14 (Fourteen) Days. (Patient taking differently: Take 1 capsule by mouth Every 14 (Fourteen) Days. The 15th and 30th of the month)     No current facility-administered medications on file prior to encounter.     Vitals:    02/25/25 1500   BP: 172/60   Pulse: 62   Resp: 16   Temp: 98.1 °F (36.7 °C)   SpO2: 94%     Pain Score    02/25/25 1500   PainSc: 0-No pain       Oncology/Hematology History   Malignant neoplasm of upper lobe of right lung   6/7/2021 Initial Diagnosis    Malignant neoplasm of upper lobe of right lung (CMS/HCC)     6/7/2021 -  Radiation    RADIATION THERAPY Treatment Details (Noted on 6/7/2021)  Site: Right Lung - Upper lobe  Technique: SBRT  Goal: Curative  Planned Treatment Start Date: No planned start date specified     6/7/2021 -  Radiation    RADIATION THERAPY Treatment Details (Noted on 6/7/2021)  Site: Right Lung - Upper lobe  Technique: SBRT  Goal: No goal specified  Planned Treatment Start Date: No planned start date specified     6/7/2021 -  Radiation    RADIATION THERAPY Treatment Details (Noted on 6/7/2021)  Site: Right Lung - Upper lobe  Technique: SBRT  Goal: Curative  Planned Treatment Start Date: No planned start date specified     6/7/2021 -  Radiation    RADIATION THERAPY Treatment Details (Noted on 6/7/2021)  Site: Right Lung - Upper lobe  Technique: SBRT  Goal: No goal specified  Planned Treatment Start Date: No planned start date specified     Malignant neoplasm of lower lobe of left lung   2/6/2025 Initial Diagnosis    Malignant neoplasm of lower lobe of left lung     2/6/2025 Cancer Staged    Staging form:  Lung, AJCC V9  - Clinical: cT2, cN0, cM0 - Signed by Brennan Medrano MD on 2/6/2025     Malignant neoplasm of lower lobe, right bronchus or lung   2/17/2025 Initial Diagnosis    Malignant neoplasm of lower lobe, right bronchus or lung     2/17/2025 -  Radiation    RADIATION THERAPY Treatment Details (Noted on 2/17/2025)  Site: Right Lung  Technique: SBRT  Goal: No goal specified  Planned Treatment Start Date: No planned start date specified         Review of Systems   Constitutional:  Positive for fatigue (7/10- had dialysis yesterday). Negative for appetite change.   HENT:  Positive for tinnitus. Negative for congestion, sore throat and trouble swallowing.    Respiratory:  Positive for shortness of breath and wheezing (occasionally, with exertion). Negative for cough.    Cardiovascular:  Negative for chest pain.   Gastrointestinal:  Positive for constipation, diarrhea and nausea (x1 a week, after dialysis). Negative for abdominal pain and vomiting.   Genitourinary:  Negative for difficulty urinating, dysuria, frequency and urgency.   Musculoskeletal:  Positive for arthralgias and back pain (ongoing, 8/10. takes tylenol when needed). Negative for neck pain.        Leg pain, tingling sensation, ongoing      Neurological:  Positive for weakness and light-headedness (with fast movement). Negative for dizziness and headache.   Psychiatric/Behavioral:  Negative for sleep disturbance.         Physical Exam  AAO  NAD  Non labored respirations    Comments: A stereotactic ablative radiation plan was devised to deliver the dose as indicated above. The patient was positioned on the treatment couch in a Vac-Fix immobilization device.  We then aligned with CBCT image guidance, which I personally checked. Once alignment was verified, we delivered the prescription dose using dynamic respiratory motion compensation under my guidance.    The Medical Physicist was also in attendance during patient set-up and treatment  delivery.    The patient tolerated the procedure without incident.    Hamida Chavira MD  Radiation Oncology  02/25/2025

## 2025-02-25 NOTE — ASSESSMENT & PLAN NOTE
Patient has heart failure with preserved ejection fraction.  She continues to experience dyspnea with mild exertion and lower extremity edema.  She continues to utilize O2 via nasal cannula.  She is unable to utilize an SGLT2 or hydralazine due to episodes of dizziness.  Will make the following changes to her heart failure medications:    1.  Increase losartan 25 mg take a full tablet at bedtime.  2.  Take Lasix/furosemide 20 mg take an extra tablet on Thursday for increased edema.  3.  Continue rest of medications as prescribed.  4.  Do blood work prior to next visit.

## 2025-02-25 NOTE — ASSESSMENT & PLAN NOTE
Patient has end-stage renal disease on hemodialysis Monday Wednesday and Friday.  She reports that they pull off 3.2 L of fluid on her appointment Monday.  She continues to experience volume overload,  short of air with minimal exertion and bilateral lower extremity edema.

## 2025-02-25 NOTE — ADDENDUM NOTE
Encounter addended by: Tanvi Orona RN on: 2/25/2025 3:41 PM   Actions taken: Charge Capture section accepted

## 2025-02-26 ENCOUNTER — HOSPITAL ENCOUNTER (OUTPATIENT)
Dept: RADIATION ONCOLOGY | Facility: HOSPITAL | Age: 89
Discharge: HOME | End: 2025-02-26

## 2025-02-26 VITALS
HEART RATE: 73 BPM | DIASTOLIC BLOOD PRESSURE: 41 MMHG | SYSTOLIC BLOOD PRESSURE: 163 MMHG | RESPIRATION RATE: 18 BRPM | WEIGHT: 145.61 LBS | TEMPERATURE: 97.5 F | OXYGEN SATURATION: 94 % | BODY MASS INDEX: 24.23 KG/M2

## 2025-02-26 DIAGNOSIS — C34.31 MALIGNANT NEOPLASM OF LOWER LOBE, RIGHT BRONCHUS OR LUNG: Primary | ICD-10-CM

## 2025-02-26 LAB
RAD ONC ARIA COURSE ID: NORMAL
RAD ONC ARIA COURSE INTENT: NORMAL
RAD ONC ARIA COURSE LAST TREATMENT DATE: NORMAL
RAD ONC ARIA COURSE START DATE: NORMAL
RAD ONC ARIA COURSE TREATMENT ELAPSED DAYS: 1
RAD ONC ARIA FIRST TREATMENT DATE: NORMAL
RAD ONC ARIA PLAN FRACTIONS TREATED TO DATE: 2
RAD ONC ARIA PLAN ID: NORMAL
RAD ONC ARIA PLAN PRESCRIBED DOSE PER FRACTION: 11 GY
RAD ONC ARIA PLAN PRIMARY REFERENCE POINT: NORMAL
RAD ONC ARIA PLAN TOTAL FRACTIONS PRESCRIBED: 5
RAD ONC ARIA PLAN TOTAL PRESCRIBED DOSE: 5500 CGY
RAD ONC ARIA REFERENCE POINT DOSAGE GIVEN TO DATE: 22 GY
RAD ONC ARIA REFERENCE POINT ID: NORMAL
RAD ONC ARIA REFERENCE POINT SESSION DOSAGE GIVEN: 11 GY

## 2025-02-26 PROCEDURE — 77373 STRTCTC BDY RAD THER TX DLVR: CPT | Performed by: RADIOLOGY

## 2025-02-26 NOTE — PROGRESS NOTES
Stereotactic Body Radiotherapy Note    02/26/2025        Treatment Site: [facility]  Energy: 6X  Dose:2200  #Fx:2  Start Date:2/25/25  Elapsed Days:1      [Associated problem not found]  Cancer Staging   cT2, cN0, cM0     No complaints re: XRT.  She has some back pain today, but states her dialysis facility has uncomfortable chairs despite the pillows and blankets she takes from home    Current Outpatient Medications on File Prior to Encounter   Medication Sig    acetaminophen (TYLENOL) 325 MG tablet Take 2 tablets by mouth Every 6 (Six) Hours As Needed for Mild Pain.    albuterol sulfate  (90 Base) MCG/ACT inhaler Inhale 2 puffs Every 4 (Four) Hours As Needed for Wheezing.    aspirin 81 MG EC tablet Take 1 tablet by mouth Daily.    budesonide (Pulmicort) 0.5 MG/2ML nebulizer solution Take 2 mL by nebulization 2 (Two) Times a Day.    carvedilol (COREG) 25 MG tablet Take 1 tablet by mouth 2 (Two) Times a Day With Meals.    clopidogrel (Plavix) 75 MG tablet Take 1 tablet by mouth Daily.    dexlansoprazole (DEXILANT) 60 MG capsule Take 1 capsule by mouth Daily.    ferrous sulfate 325 (65 FE) MG tablet Take 1 tablet by mouth Daily With Breakfast.    fluticasone (FLONASE) 50 MCG/ACT nasal spray Administer 1 spray into the nostril(s) as directed by provider Daily.    furosemide (LASIX) 20 MG tablet Take 1 tablet by mouth Daily.    ipratropium-albuterol (DUO-NEB) 0.5-2.5 mg/3 ml nebulizer Take 3 mL by nebulization Every 6 (Six) Hours As Needed for Wheezing.    isosorbide mononitrate (IMDUR) 30 MG 24 hr tablet Take 1 tablet by mouth Every Night.    levocetirizine (XYZAL) 5 MG tablet TAKE 1 TABLET DAILY    levothyroxine (SYNTHROID, LEVOTHROID) 50 MCG tablet Take 1 tablet by mouth Daily.    lidocaine-prilocaine (EMLA) 2.5-2.5 % cream Apply 1 Application topically to the appropriate area as directed Take As Directed. Apply to dialysis access site 30-45 minutes prior to dialysis    losartan (Cozaar) 25 MG tablet Take 1 tablet  by mouth Every Night. Half tab for one week then 1 tab if SBP is greater than 140    Psyllium ([facility] DAILY FIBER PO) Take 2 tablets by mouth Daily.    rosuvastatin (CRESTOR) 20 MG tablet Take 1 tablet by mouth Every Night.    sevelamer (RENVELA) 800 MG tablet Take 1 tablet by mouth 3 (Three) Times a Day With Meals.    tobramycin PF (JUAN) 300 MG/5ML nebulizer solution Take 5 mL by nebulization 2 (Two) Times a Day. 30 days on 30 days off    vitamin D (ERGOCALCIFEROL) 1.25 MG (99044 UT) capsule capsule Take 1 capsule by mouth Every 14 (Fourteen) Days. (Patient taking differently: Take 1 capsule by mouth Every 14 (Fourteen) Days. The 15th and 30th of the month)     No current facility-administered medications on file prior to encounter.     Vitals:    02/26/25 1448   BP: 163/41   Pulse: 73   Resp: 18   Temp: 97.5 °F (36.4 °C)   SpO2: 94%     Pain Score    02/26/25 1448   PainSc: 8    PainLoc: Back       Oncology/Hematology History   Malignant neoplasm of upper lobe of right lung   6/7/2021 Initial Diagnosis    Malignant neoplasm of upper lobe of right lung (CMS/HCC)     6/7/2021 -  Radiation    RADIATION THERAPY Treatment Details (Noted on 6/7/2021)  Site: Right Lung - Upper lobe  Technique: SBRT  Goal: Curative  Planned Treatment Start Date: No planned start date specified     6/7/2021 -  Radiation    RADIATION THERAPY Treatment Details (Noted on 6/7/2021)  Site: Right Lung - Upper lobe  Technique: SBRT  Goal: No goal specified  Planned Treatment Start Date: No planned start date specified     6/7/2021 -  Radiation    RADIATION THERAPY Treatment Details (Noted on 6/7/2021)  Site: Right Lung - Upper lobe  Technique: SBRT  Goal: Curative  Planned Treatment Start Date: No planned start date specified     6/7/2021 -  Radiation    RADIATION THERAPY Treatment Details (Noted on 6/7/2021)  Site: Right Lung - Upper lobe  Technique: SBRT  Goal: No goal specified  Planned Treatment Start Date: No planned start date specified      Malignant neoplasm of lower lobe of left lung   2/6/2025 Initial Diagnosis    Malignant neoplasm of lower lobe of left lung     2/6/2025 Cancer Staged    Staging form: Lung, AJCC V9  - Clinical: cT2, cN0, cM0 - Signed by Brennan Medrano MD on 2/6/2025     Malignant neoplasm of lower lobe, right bronchus or lung   2/17/2025 Initial Diagnosis    Malignant neoplasm of lower lobe, right bronchus or lung     2/17/2025 -  Radiation    RADIATION THERAPY Treatment Details (Noted on 2/17/2025)  Site: Right Lung  Technique: SBRT  Goal: No goal specified  Planned Treatment Start Date: No planned start date specified         Review of Systems   Constitutional:  Positive for fatigue (7/10). Negative for appetite change.   HENT:  Positive for tinnitus. Negative for congestion, sore throat and trouble swallowing.    Respiratory:  Positive for cough (dry), shortness of breath and wheezing (occasionally, with exertion).    Cardiovascular:  Negative for chest pain.   Gastrointestinal:  Positive for nausea (mild). Negative for abdominal pain, constipation, diarrhea and vomiting.   Genitourinary:  Negative for difficulty urinating, dysuria, frequency and urgency.        Dialysis MWF     Musculoskeletal:  Positive for arthralgias, back pain (ongoing, 8/10. takes tylenol when needed) and gait problem (r/t weakness). Negative for neck pain.        Leg pain, tingling sensation, ongoing      Skin:  Negative for dry skin.   Neurological:  Positive for dizziness (with postural changes), weakness, light-headedness (with fast movement) and headache (earlier today relieved with Tylenol).   Psychiatric/Behavioral:  Negative for sleep disturbance.         Physical Exam  AAO  NAD  Non labored respirations    Comments: A stereotactic ablative radiation plan was devised to deliver the dose as indicated above. The patient was positioned on the treatment couch in a Vac-Fix immobilization device.  We then aligned with CBCT image guidance,  which I personally checked. Once alignment was verified, we delivered the prescription dose using dynamic respiratory motion compensation under my guidance.    The Medical Physicist was also in attendance during patient set-up and treatment delivery.    The patient tolerated the procedure without incident.    Hamida Chavira MD  Radiation Oncology  02/26/2025

## 2025-02-27 ENCOUNTER — HOSPITAL ENCOUNTER (OUTPATIENT)
Dept: RADIATION ONCOLOGY | Facility: HOSPITAL | Age: 89
Discharge: HOME | End: 2025-02-27

## 2025-02-27 VITALS
RESPIRATION RATE: 18 BRPM | DIASTOLIC BLOOD PRESSURE: 50 MMHG | TEMPERATURE: 98 F | OXYGEN SATURATION: 100 % | SYSTOLIC BLOOD PRESSURE: 172 MMHG | HEART RATE: 72 BPM

## 2025-02-27 DIAGNOSIS — C34.31 MALIGNANT NEOPLASM OF LOWER LOBE, RIGHT BRONCHUS OR LUNG: Primary | ICD-10-CM

## 2025-02-27 LAB
RAD ONC ARIA COURSE ID: NORMAL
RAD ONC ARIA COURSE INTENT: NORMAL
RAD ONC ARIA COURSE LAST TREATMENT DATE: NORMAL
RAD ONC ARIA COURSE START DATE: NORMAL
RAD ONC ARIA COURSE TREATMENT ELAPSED DAYS: 2
RAD ONC ARIA FIRST TREATMENT DATE: NORMAL
RAD ONC ARIA PLAN FRACTIONS TREATED TO DATE: 3
RAD ONC ARIA PLAN ID: NORMAL
RAD ONC ARIA PLAN PRESCRIBED DOSE PER FRACTION: 11 GY
RAD ONC ARIA PLAN PRIMARY REFERENCE POINT: NORMAL
RAD ONC ARIA PLAN TOTAL FRACTIONS PRESCRIBED: 5
RAD ONC ARIA PLAN TOTAL PRESCRIBED DOSE: 5500 CGY
RAD ONC ARIA REFERENCE POINT DOSAGE GIVEN TO DATE: 33 GY
RAD ONC ARIA REFERENCE POINT ID: NORMAL
RAD ONC ARIA REFERENCE POINT SESSION DOSAGE GIVEN: 11 GY

## 2025-02-27 PROCEDURE — 77373 STRTCTC BDY RAD THER TX DLVR: CPT | Performed by: RADIOLOGY

## 2025-02-27 NOTE — PROGRESS NOTES
Stereotactic Body Radiotherapy Note    02/27/2025        Treatment Site: [facility]  Energy: 6X  Dose:3300/5500 cGy  #Fx:3/5  Start Date:2/25/25  Elapsed Days:2      [Associated problem not found]  Cancer Staging   cT2, cN0, cM0     No complaints re: XRT.  She plaints of back pain which is chronic.    Current Outpatient Medications on File Prior to Encounter   Medication Sig    acetaminophen (TYLENOL) 325 MG tablet Take 2 tablets by mouth Every 6 (Six) Hours As Needed for Mild Pain.    albuterol sulfate  (90 Base) MCG/ACT inhaler Inhale 2 puffs Every 4 (Four) Hours As Needed for Wheezing.    aspirin 81 MG EC tablet Take 1 tablet by mouth Daily.    budesonide (Pulmicort) 0.5 MG/2ML nebulizer solution Take 2 mL by nebulization 2 (Two) Times a Day.    carvedilol (COREG) 25 MG tablet Take 1 tablet by mouth 2 (Two) Times a Day With Meals.    clopidogrel (Plavix) 75 MG tablet Take 1 tablet by mouth Daily.    dexlansoprazole (DEXILANT) 60 MG capsule Take 1 capsule by mouth Daily.    ferrous sulfate 325 (65 FE) MG tablet Take 1 tablet by mouth Daily With Breakfast.    fluticasone (FLONASE) 50 MCG/ACT nasal spray Administer 1 spray into the nostril(s) as directed by provider Daily.    furosemide (LASIX) 20 MG tablet Take 1 tablet by mouth Daily.    ipratropium-albuterol (DUO-NEB) 0.5-2.5 mg/3 ml nebulizer Take 3 mL by nebulization Every 6 (Six) Hours As Needed for Wheezing.    isosorbide mononitrate (IMDUR) 30 MG 24 hr tablet Take 1 tablet by mouth Every Night.    levocetirizine (XYZAL) 5 MG tablet TAKE 1 TABLET DAILY    levothyroxine (SYNTHROID, LEVOTHROID) 50 MCG tablet Take 1 tablet by mouth Daily.    lidocaine-prilocaine (EMLA) 2.5-2.5 % cream Apply 1 Application topically to the appropriate area as directed Take As Directed. Apply to dialysis access site 30-45 minutes prior to dialysis    losartan (Cozaar) 25 MG tablet Take 1 tablet by mouth Every Night. Half tab for one week then 1 tab if SBP is greater than 140     Psyllium ([facility] DAILY FIBER PO) Take 2 tablets by mouth Daily.    rosuvastatin (CRESTOR) 20 MG tablet Take 1 tablet by mouth Every Night.    sevelamer (RENVELA) 800 MG tablet Take 1 tablet by mouth 3 (Three) Times a Day With Meals.    tobramycin PF (JUAN) 300 MG/5ML nebulizer solution Take 5 mL by nebulization 2 (Two) Times a Day. 30 days on 30 days off    vitamin D (ERGOCALCIFEROL) 1.25 MG (66033 UT) capsule capsule Take 1 capsule by mouth Every 14 (Fourteen) Days. (Patient taking differently: Take 1 capsule by mouth Every 14 (Fourteen) Days. The 15th and 30th of the month)     No current facility-administered medications on file prior to encounter.     Vitals:    02/27/25 1436   BP: 172/50   Pulse: 72   Resp: 18   Temp: 98 °F (36.7 °C)   SpO2: 100%     Pain Score    02/27/25 1436   PainSc: 10-Worst pain ever   PainLoc: Back       Oncology/Hematology History   Malignant neoplasm of upper lobe of right lung   6/7/2021 Initial Diagnosis    Malignant neoplasm of upper lobe of right lung (CMS/HCC)     6/7/2021 -  Radiation    RADIATION THERAPY Treatment Details (Noted on 6/7/2021)  Site: Right Lung - Upper lobe  Technique: SBRT  Goal: Curative  Planned Treatment Start Date: No planned start date specified     6/7/2021 -  Radiation    RADIATION THERAPY Treatment Details (Noted on 6/7/2021)  Site: Right Lung - Upper lobe  Technique: SBRT  Goal: No goal specified  Planned Treatment Start Date: No planned start date specified     6/7/2021 -  Radiation    RADIATION THERAPY Treatment Details (Noted on 6/7/2021)  Site: Right Lung - Upper lobe  Technique: SBRT  Goal: Curative  Planned Treatment Start Date: No planned start date specified     6/7/2021 -  Radiation    RADIATION THERAPY Treatment Details (Noted on 6/7/2021)  Site: Right Lung - Upper lobe  Technique: SBRT  Goal: No goal specified  Planned Treatment Start Date: No planned start date specified     Malignant neoplasm of lower lobe of left lung   2/6/2025 Initial  Diagnosis    Malignant neoplasm of lower lobe of left lung     2/6/2025 Cancer Staged    Staging form: Lung, AJCC V9  - Clinical: cT2, cN0, cM0 - Signed by Brennan Medrano MD on 2/6/2025     Malignant neoplasm of lower lobe, right bronchus or lung   2/17/2025 Initial Diagnosis    Malignant neoplasm of lower lobe, right bronchus or lung     2/17/2025 -  Radiation    RADIATION THERAPY Treatment Details (Noted on 2/17/2025)  Site: Right Lung  Technique: SBRT  Goal: No goal specified  Planned Treatment Start Date: No planned start date specified         Review of Systems   Constitutional:  Positive for fatigue (7/10). Negative for appetite change.   HENT:  Positive for tinnitus. Negative for congestion, sore throat and trouble swallowing.    Respiratory:  Positive for cough (dry), shortness of breath and wheezing (occasionally, with exertion).    Cardiovascular:  Negative for chest pain.   Gastrointestinal:  Positive for nausea (mild). Negative for abdominal pain, constipation, diarrhea and vomiting.   Genitourinary:  Negative for difficulty urinating, dysuria, frequency and urgency.        Dialysis MWF     Musculoskeletal:  Positive for arthralgias, back pain (ongoing, 8/10. takes tylenol when needed) and gait problem (r/t weakness). Negative for neck pain.        Leg pain, tingling sensation, ongoing      Skin:  Negative for dry skin.   Neurological:  Positive for dizziness (with postural changes), weakness, light-headedness (with fast movement) and headache (earlier today relieved with Tylenol).   Psychiatric/Behavioral:  Negative for sleep disturbance.         Physical Exam  AAO  NAD  Non labored respirations.    Comments: A stereotactic ablative radiation plan was devised to deliver the dose as indicated above. The patient was positioned on the treatment couch in a Vac-Fix immobilization device.  We then aligned with CBCT image guidance, which I personally checked. Once alignment was verified, we delivered  the prescription dose using dynamic respiratory motion compensation under my guidance.    The Medical Physicist was also in attendance during patient set-up and treatment delivery.    The patient tolerated the procedure without incident.    Ruth Arellano MD  Radiation Oncology  02/27/2025

## 2025-02-28 ENCOUNTER — HOSPITAL ENCOUNTER (OUTPATIENT)
Dept: RADIATION ONCOLOGY | Facility: HOSPITAL | Age: 89
Discharge: HOME | End: 2025-02-28

## 2025-02-28 VITALS
BODY MASS INDEX: 22.76 KG/M2 | HEART RATE: 66 BPM | OXYGEN SATURATION: 100 % | DIASTOLIC BLOOD PRESSURE: 54 MMHG | RESPIRATION RATE: 16 BRPM | WEIGHT: 136.8 LBS | SYSTOLIC BLOOD PRESSURE: 178 MMHG

## 2025-02-28 DIAGNOSIS — C34.31 MALIGNANT NEOPLASM OF LOWER LOBE, RIGHT BRONCHUS OR LUNG: Primary | ICD-10-CM

## 2025-02-28 LAB
RAD ONC ARIA COURSE ID: NORMAL
RAD ONC ARIA COURSE INTENT: NORMAL
RAD ONC ARIA COURSE LAST TREATMENT DATE: NORMAL
RAD ONC ARIA COURSE START DATE: NORMAL
RAD ONC ARIA COURSE TREATMENT ELAPSED DAYS: 3
RAD ONC ARIA FIRST TREATMENT DATE: NORMAL
RAD ONC ARIA PLAN FRACTIONS TREATED TO DATE: 4
RAD ONC ARIA PLAN ID: NORMAL
RAD ONC ARIA PLAN PRESCRIBED DOSE PER FRACTION: 11 GY
RAD ONC ARIA PLAN PRIMARY REFERENCE POINT: NORMAL
RAD ONC ARIA PLAN TOTAL FRACTIONS PRESCRIBED: 5
RAD ONC ARIA PLAN TOTAL PRESCRIBED DOSE: 5500 CGY
RAD ONC ARIA REFERENCE POINT DOSAGE GIVEN TO DATE: 44 GY
RAD ONC ARIA REFERENCE POINT ID: NORMAL
RAD ONC ARIA REFERENCE POINT SESSION DOSAGE GIVEN: 11 GY

## 2025-02-28 PROCEDURE — 77373 STRTCTC BDY RAD THER TX DLVR: CPT | Performed by: RADIOLOGY

## 2025-02-28 NOTE — PROGRESS NOTES
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  She is receiving radiation therapy to the RLL lung. She  has received 4400 cGy in 4 fractions out of a planned dose of 5500 cGy in 5 fractions.    Today on exam the patient is tolerating radiation therapy well and has no new disease or treatment-related complaints.                                           Review of Systems:   Progressive fatigue    Vitals:     Vitals:    02/28/25 1515   BP: 178/54   Pulse: 66   Resp: 16   SpO2: 100%       Weight:   Wt Readings from Last 3 Encounters:   02/28/25 62.1 kg (136 lb 12.7 oz)   02/26/25 66 kg (145 lb 9.8 oz)   02/25/25 63.5 kg (140 lb)      Pain:    Pain Score    02/28/25 1515   PainSc: 8    PainLoc: Buttocks  Comment: & leg. ongoing         Physical Exam:  Gen: WD/WN; NAD  HEENT: MMM  Trachea: midline  Chest: symmetric  Resp: normal respiratory effort; nasal cannula in place  Extr: warm, well-perfused  Neuro: awake and alert; no aphasia or neglect    Plan: I have reviewed treatment setup notes, checked and approved the daily guidance images.  I reviewed dose delivery, treatment parameters and deemed them appropriate. We plan to continue radiation therapy as prescribed.          Radiation Oncology    Electronically signed by Regis Mckeon MD 2/28/2025  16:14 EST

## 2025-02-28 NOTE — PROGRESS NOTES
Stereotactic Body Radiotherapy Note    02/28/2025        Treatment Site: Right lower lobe  Energy: 6X  Dose: 4400 cGy  #Fx: 4  Start Date: 2/25/2025  Elapsed Days: 3        Cancer Staging   cT2, cN0, cM0     Current Outpatient Medications on File Prior to Encounter   Medication Sig    acetaminophen (TYLENOL) 325 MG tablet Take 2 tablets by mouth Every 6 (Six) Hours As Needed for Mild Pain.    albuterol sulfate  (90 Base) MCG/ACT inhaler Inhale 2 puffs Every 4 (Four) Hours As Needed for Wheezing.    aspirin 81 MG EC tablet Take 1 tablet by mouth Daily.    budesonide (Pulmicort) 0.5 MG/2ML nebulizer solution Take 2 mL by nebulization 2 (Two) Times a Day.    carvedilol (COREG) 25 MG tablet Take 1 tablet by mouth 2 (Two) Times a Day With Meals.    clopidogrel (Plavix) 75 MG tablet Take 1 tablet by mouth Daily.    dexlansoprazole (DEXILANT) 60 MG capsule Take 1 capsule by mouth Daily.    ferrous sulfate 325 (65 FE) MG tablet Take 1 tablet by mouth Daily With Breakfast.    fluticasone (FLONASE) 50 MCG/ACT nasal spray Administer 1 spray into the nostril(s) as directed by provider Daily.    furosemide (LASIX) 20 MG tablet Take 1 tablet by mouth Daily.    ipratropium-albuterol (DUO-NEB) 0.5-2.5 mg/3 ml nebulizer Take 3 mL by nebulization Every 6 (Six) Hours As Needed for Wheezing.    isosorbide mononitrate (IMDUR) 30 MG 24 hr tablet Take 1 tablet by mouth Every Night.    levocetirizine (XYZAL) 5 MG tablet TAKE 1 TABLET DAILY    levothyroxine (SYNTHROID, LEVOTHROID) 50 MCG tablet Take 1 tablet by mouth Daily.    lidocaine-prilocaine (EMLA) 2.5-2.5 % cream Apply 1 Application topically to the appropriate area as directed Take As Directed. Apply to dialysis access site 30-45 minutes prior to dialysis    losartan (Cozaar) 25 MG tablet Take 1 tablet by mouth Every Night. Half tab for one week then 1 tab if SBP is greater than 140    Psyllium ([facility] DAILY FIBER PO) Take 2 tablets by mouth Daily.    rosuvastatin (CRESTOR) 20  MG tablet Take 1 tablet by mouth Every Night.    sevelamer (RENVELA) 800 MG tablet Take 1 tablet by mouth 3 (Three) Times a Day With Meals.    tobramycin PF (JUAN) 300 MG/5ML nebulizer solution Take 5 mL by nebulization 2 (Two) Times a Day. 30 days on 30 days off    vitamin D (ERGOCALCIFEROL) 1.25 MG (37384 UT) capsule capsule Take 1 capsule by mouth Every 14 (Fourteen) Days. (Patient taking differently: Take 1 capsule by mouth Every 14 (Fourteen) Days. The 15th and 30th of the month)     No current facility-administered medications on file prior to encounter.     Vitals:    02/28/25 1515   BP: 178/54   Pulse: 66   Resp: 16   SpO2: 100%     Pain Score    02/28/25 1515   PainSc: 8    PainLoc: Buttocks  Comment: & leg. ongoing       Oncology/Hematology History   Malignant neoplasm of upper lobe of right lung   6/7/2021 Initial Diagnosis    Malignant neoplasm of upper lobe of right lung (CMS/HCC)     6/7/2021 -  Radiation    RADIATION THERAPY Treatment Details (Noted on 6/7/2021)  Site: Right Lung - Upper lobe  Technique: SBRT  Goal: Curative  Planned Treatment Start Date: No planned start date specified     6/7/2021 -  Radiation    RADIATION THERAPY Treatment Details (Noted on 6/7/2021)  Site: Right Lung - Upper lobe  Technique: SBRT  Goal: No goal specified  Planned Treatment Start Date: No planned start date specified     6/7/2021 -  Radiation    RADIATION THERAPY Treatment Details (Noted on 6/7/2021)  Site: Right Lung - Upper lobe  Technique: SBRT  Goal: Curative  Planned Treatment Start Date: No planned start date specified     6/7/2021 -  Radiation    RADIATION THERAPY Treatment Details (Noted on 6/7/2021)  Site: Right Lung - Upper lobe  Technique: SBRT  Goal: No goal specified  Planned Treatment Start Date: No planned start date specified     Malignant neoplasm of lower lobe of left lung   2/6/2025 Initial Diagnosis    Malignant neoplasm of lower lobe of left lung     2/6/2025 Cancer Staged    Staging form:  Lung, AJCC V9  - Clinical: cT2, cN0, cM0 - Signed by Brennan Medrano MD on 2/6/2025     Malignant neoplasm of lower lobe, right bronchus or lung   2/17/2025 Initial Diagnosis    Malignant neoplasm of lower lobe, right bronchus or lung     2/17/2025 -  Radiation    RADIATION THERAPY Treatment Details (Noted on 2/17/2025)  Site: Right Lung  Technique: SBRT  Goal: No goal specified  Planned Treatment Start Date: No planned start date specified         Review of Systems   Constitutional:  Positive for appetite change (decreased- not as hungry) and fatigue (10/10).   HENT:  Positive for tinnitus. Negative for sore throat.    Respiratory:  Positive for shortness of breath. Negative for cough and wheezing.    Cardiovascular:  Positive for leg swelling. Negative for chest pain.   Gastrointestinal:  Positive for nausea (occasionally). Negative for abdominal pain, constipation, diarrhea and vomiting.   Genitourinary:  Negative for difficulty urinating, dysuria, frequency and urgency.   Musculoskeletal:  Positive for arthralgias, back pain and gait problem (uses wheelchair). Negative for neck pain.   Neurological:  Positive for dizziness (getting up too fast). Negative for light-headedness and headache.            Physical Exam    Comments: A stereotactic ablative radiation plan was devised to deliver the dose as indicated above. The patient was positioned on the treatment couch in a Vac-Fix immobilization device.  We then aligned with CBCT image guidance, which I personally checked. Once alignment was verified, we delivered the prescription dose using dynamic respiratory motion compensation under my guidance.    The Medical Physicist was also in attendance during patient set-up and treatment delivery.    The patient tolerated the procedure without incident.    Regis Mckeon MD  Radiation Oncology  02/28/2025

## 2025-03-03 ENCOUNTER — HOSPITAL ENCOUNTER (OUTPATIENT)
Dept: RADIATION ONCOLOGY | Facility: HOSPITAL | Age: 89
Setting detail: RADIATION/ONCOLOGY SERIES
End: 2025-03-03
Payer: MEDICARE

## 2025-03-03 ENCOUNTER — HOSPITAL ENCOUNTER (OUTPATIENT)
Dept: RADIATION ONCOLOGY | Facility: HOSPITAL | Age: 89
Discharge: HOME | End: 2025-03-03

## 2025-03-03 ENCOUNTER — TELEPHONE (OUTPATIENT)
Dept: ONCOLOGY | Facility: HOSPITAL | Age: 89
End: 2025-03-03
Payer: MEDICARE

## 2025-03-03 VITALS
RESPIRATION RATE: 16 BRPM | BODY MASS INDEX: 23.11 KG/M2 | DIASTOLIC BLOOD PRESSURE: 59 MMHG | HEART RATE: 67 BPM | SYSTOLIC BLOOD PRESSURE: 162 MMHG | TEMPERATURE: 97.5 F | WEIGHT: 138.89 LBS | OXYGEN SATURATION: 97 %

## 2025-03-03 DIAGNOSIS — C34.31 MALIGNANT NEOPLASM OF LOWER LOBE, RIGHT BRONCHUS OR LUNG: Primary | ICD-10-CM

## 2025-03-03 LAB
RAD ONC ARIA COURSE ID: NORMAL
RAD ONC ARIA COURSE INTENT: NORMAL
RAD ONC ARIA COURSE LAST TREATMENT DATE: NORMAL
RAD ONC ARIA COURSE START DATE: NORMAL
RAD ONC ARIA COURSE TREATMENT ELAPSED DAYS: 6
RAD ONC ARIA FIRST TREATMENT DATE: NORMAL
RAD ONC ARIA PLAN FRACTIONS TREATED TO DATE: 5
RAD ONC ARIA PLAN ID: NORMAL
RAD ONC ARIA PLAN PRESCRIBED DOSE PER FRACTION: 11 GY
RAD ONC ARIA PLAN PRIMARY REFERENCE POINT: NORMAL
RAD ONC ARIA PLAN TOTAL FRACTIONS PRESCRIBED: 5
RAD ONC ARIA PLAN TOTAL PRESCRIBED DOSE: 5500 CGY
RAD ONC ARIA REFERENCE POINT DOSAGE GIVEN TO DATE: 55 GY
RAD ONC ARIA REFERENCE POINT ID: NORMAL
RAD ONC ARIA REFERENCE POINT SESSION DOSAGE GIVEN: 11 GY

## 2025-03-03 PROCEDURE — 77373 STRTCTC BDY RAD THER TX DLVR: CPT | Performed by: RADIOLOGY

## 2025-03-03 PROCEDURE — 77336 RADIATION PHYSICS CONSULT: CPT | Performed by: RADIOLOGY

## 2025-03-03 NOTE — PROGRESS NOTES
Stereotactic Body Radiotherapy Note    03/03/2025        Treatment Site: Right lower lobe  Energy: 6X  Dose: 5500 cGy  #Fx: 5  Start Date: 2/25/2025  Elapsed Days: 6        Cancer Staging   cT2, cN0, cM0     Current Outpatient Medications on File Prior to Encounter   Medication Sig    acetaminophen (TYLENOL) 325 MG tablet Take 2 tablets by mouth Every 6 (Six) Hours As Needed for Mild Pain.    albuterol sulfate  (90 Base) MCG/ACT inhaler Inhale 2 puffs Every 4 (Four) Hours As Needed for Wheezing.    aspirin 81 MG EC tablet Take 1 tablet by mouth Daily.    budesonide (Pulmicort) 0.5 MG/2ML nebulizer solution Take 2 mL by nebulization 2 (Two) Times a Day.    carvedilol (COREG) 25 MG tablet Take 1 tablet by mouth 2 (Two) Times a Day With Meals.    clopidogrel (Plavix) 75 MG tablet Take 1 tablet by mouth Daily.    dexlansoprazole (DEXILANT) 60 MG capsule Take 1 capsule by mouth Daily.    ferrous sulfate 325 (65 FE) MG tablet Take 1 tablet by mouth Daily With Breakfast.    fluticasone (FLONASE) 50 MCG/ACT nasal spray Administer 1 spray into the nostril(s) as directed by provider Daily.    furosemide (LASIX) 20 MG tablet Take 1 tablet by mouth Daily.    ipratropium-albuterol (DUO-NEB) 0.5-2.5 mg/3 ml nebulizer Take 3 mL by nebulization Every 6 (Six) Hours As Needed for Wheezing.    isosorbide mononitrate (IMDUR) 30 MG 24 hr tablet Take 1 tablet by mouth Every Night.    levocetirizine (XYZAL) 5 MG tablet TAKE 1 TABLET DAILY    levothyroxine (SYNTHROID, LEVOTHROID) 50 MCG tablet Take 1 tablet by mouth Daily.    lidocaine-prilocaine (EMLA) 2.5-2.5 % cream Apply 1 Application topically to the appropriate area as directed Take As Directed. Apply to dialysis access site 30-45 minutes prior to dialysis    losartan (Cozaar) 25 MG tablet Take 1 tablet by mouth Every Night. Half tab for one week then 1 tab if SBP is greater than 140    Psyllium ([facility] DAILY FIBER PO) Take 2 tablets by mouth Daily.    rosuvastatin (CRESTOR) 20  MG tablet Take 1 tablet by mouth Every Night.    sevelamer (RENVELA) 800 MG tablet Take 1 tablet by mouth 3 (Three) Times a Day With Meals.    tobramycin PF (JUAN) 300 MG/5ML nebulizer solution Take 5 mL by nebulization 2 (Two) Times a Day. 30 days on 30 days off    vitamin D (ERGOCALCIFEROL) 1.25 MG (65532 UT) capsule capsule Take 1 capsule by mouth Every 14 (Fourteen) Days. (Patient taking differently: Take 1 capsule by mouth Every 14 (Fourteen) Days. The 15th and 30th of the month)     No current facility-administered medications on file prior to encounter.     Vitals:    03/03/25 1458   BP: 162/59   Pulse: 67   Resp: 16   Temp: 97.5 °F (36.4 °C)   SpO2: 97%     Pain Score    03/03/25 1458   PainSc: 8    PainLoc: Back  Comment: Back and legs       Oncology/Hematology History   Malignant neoplasm of upper lobe of right lung   6/7/2021 Initial Diagnosis    Malignant neoplasm of upper lobe of right lung (CMS/HCC)     6/7/2021 -  Radiation    RADIATION THERAPY Treatment Details (Noted on 6/7/2021)  Site: Right Lung - Upper lobe  Technique: SBRT  Goal: Curative  Planned Treatment Start Date: No planned start date specified     6/7/2021 -  Radiation    RADIATION THERAPY Treatment Details (Noted on 6/7/2021)  Site: Right Lung - Upper lobe  Technique: SBRT  Goal: No goal specified  Planned Treatment Start Date: No planned start date specified     6/7/2021 -  Radiation    RADIATION THERAPY Treatment Details (Noted on 6/7/2021)  Site: Right Lung - Upper lobe  Technique: SBRT  Goal: Curative  Planned Treatment Start Date: No planned start date specified     6/7/2021 -  Radiation    RADIATION THERAPY Treatment Details (Noted on 6/7/2021)  Site: Right Lung - Upper lobe  Technique: SBRT  Goal: No goal specified  Planned Treatment Start Date: No planned start date specified     Malignant neoplasm of lower lobe of left lung   2/6/2025 Initial Diagnosis    Malignant neoplasm of lower lobe of left lung     2/6/2025 Cancer Staged     Staging form: Lung, AJCC V9  - Clinical: cT2, cN0, cM0 - Signed by Brennan Medrano MD on 2/6/2025     Malignant neoplasm of lower lobe, right bronchus or lung   2/17/2025 Initial Diagnosis    Malignant neoplasm of lower lobe, right bronchus or lung     2/17/2025 -  Radiation    RADIATION THERAPY Treatment Details (Noted on 2/17/2025)  Site: Right Lung  Technique: SBRT  Goal: No goal specified  Planned Treatment Start Date: No planned start date specified         Review of Systems   Constitutional:  Positive for appetite change (Fair) and fatigue (8/10, worsening weakness.). Negative for chills, fever, unexpected weight gain and unexpected weight loss.   HENT:  Positive for tinnitus (Ongoing). Negative for sore throat and trouble swallowing.    Respiratory:  Positive for cough (Occasionally, dry, ongoing) and shortness of breath (Constant, wears 3-3.5L of o2 per NC at all times, ongoing). Negative for chest tightness and wheezing.    Cardiovascular:  Positive for chest pain (Lower chest/abdominal pain, noted when she is due for dialysis.  Discomfort relieved with dialysis.  Denies any radiation or increased soa with pain.) and leg swelling. Negative for palpitations.   Gastrointestinal:  Positive for abdominal pain (Lower chest/abdominal pain, noted when she is due for dialysis. Discomfort relieved with dialysis.). Negative for anal bleeding, blood in stool, constipation, diarrhea, nausea and vomiting.   Genitourinary:  Negative for difficulty urinating, dysuria, frequency, hematuria and urgency.        On Dialysis MWF.   Musculoskeletal:  Positive for arthralgias (Left leg, 8/10, ongoing.), back pain (Lower back, ongoing, 8/10.) and gait problem (uses wheelchair, uses walker at home.). Negative for neck pain.   Skin:  Negative for color change and rash.   Neurological:  Positive for dizziness (getting up too fast, ongoing), weakness (Generalized, believes to be r/t fatigue and dialysis.) and headache  (Noted last Thursday- relieved with multiple doses of Tylenol.). Negative for tremors, seizures, syncope, speech difficulty and numbness.   Psychiatric/Behavioral:  Negative for decreased concentration.             Physical Exam    Comments: A stereotactic ablative radiation plan was devised to deliver the dose as indicated above. The patient was positioned on the treatment couch in a Vac-Fix immobilization device.  We then aligned with CBCT image guidance, which I personally checked. Once alignment was verified, we delivered the prescription dose using dynamic respiratory motion compensation under my guidance.    The Medical Physicist was also in attendance during patient set-up and treatment delivery.    The patient tolerated the procedure without incident.    Regis Mckeon MD  Radiation Oncology  03/03/2025

## 2025-03-18 ENCOUNTER — OFFICE VISIT (OUTPATIENT)
Dept: INTERNAL MEDICINE | Age: 89
End: 2025-03-18
Payer: MEDICARE

## 2025-03-18 ENCOUNTER — PATIENT ROUNDING (BHMG ONLY) (OUTPATIENT)
Dept: RADIATION ONCOLOGY | Facility: HOSPITAL | Age: 89
End: 2025-03-18
Payer: MEDICARE

## 2025-03-18 ENCOUNTER — LAB (OUTPATIENT)
Dept: ONCOLOGY | Facility: HOSPITAL | Age: 89
End: 2025-03-18
Payer: MEDICARE

## 2025-03-18 VITALS
WEIGHT: 136 LBS | HEIGHT: 65 IN | BODY MASS INDEX: 22.66 KG/M2 | SYSTOLIC BLOOD PRESSURE: 150 MMHG | HEART RATE: 89 BPM | DIASTOLIC BLOOD PRESSURE: 82 MMHG | TEMPERATURE: 98.2 F | OXYGEN SATURATION: 90 %

## 2025-03-18 DIAGNOSIS — C34.32 MALIGNANT NEOPLASM OF LOWER LOBE OF LEFT LUNG: ICD-10-CM

## 2025-03-18 DIAGNOSIS — E55.9 VITAMIN D DEFICIENCY: ICD-10-CM

## 2025-03-18 DIAGNOSIS — R53.1 WEAKNESS: ICD-10-CM

## 2025-03-18 DIAGNOSIS — I10 ESSENTIAL HYPERTENSION: ICD-10-CM

## 2025-03-18 DIAGNOSIS — R09.02 HYPOXIA: ICD-10-CM

## 2025-03-18 DIAGNOSIS — T82.590S MALFUNCTION OF ARTERIOVENOUS GRAFT, SEQUELA: ICD-10-CM

## 2025-03-18 DIAGNOSIS — R54 FRAILTY SYNDROME IN GERIATRIC PATIENT: ICD-10-CM

## 2025-03-18 DIAGNOSIS — D63.1 ANEMIA DUE TO CHRONIC KIDNEY DISEASE, ON CHRONIC DIALYSIS: ICD-10-CM

## 2025-03-18 DIAGNOSIS — I35.0 AORTIC STENOSIS, MODERATE: ICD-10-CM

## 2025-03-18 DIAGNOSIS — R76.9 ABNORMAL SERUM IMMUNOELECTROPHORESIS: ICD-10-CM

## 2025-03-18 DIAGNOSIS — N18.6 ANEMIA DUE TO CHRONIC KIDNEY DISEASE, ON CHRONIC DIALYSIS: ICD-10-CM

## 2025-03-18 DIAGNOSIS — Z99.2 ESRD (END STAGE RENAL DISEASE) ON DIALYSIS: ICD-10-CM

## 2025-03-18 DIAGNOSIS — Z87.891 EX-SMOKER: ICD-10-CM

## 2025-03-18 DIAGNOSIS — Z99.2 ANEMIA DUE TO CHRONIC KIDNEY DISEASE, ON CHRONIC DIALYSIS: ICD-10-CM

## 2025-03-18 DIAGNOSIS — C34.11 MALIGNANT NEOPLASM OF UPPER LOBE OF RIGHT LUNG: Primary | ICD-10-CM

## 2025-03-18 DIAGNOSIS — N18.6 ESRD (END STAGE RENAL DISEASE) ON DIALYSIS: ICD-10-CM

## 2025-03-18 DIAGNOSIS — I50.32 CHRONIC HEART FAILURE WITH PRESERVED EJECTION FRACTION: ICD-10-CM

## 2025-03-18 LAB
ALBUMIN SERPL-MCNC: 3.2 G/DL (ref 3.5–5.2)
ALBUMIN/GLOB SERPL: 1 G/DL
ALP SERPL-CCNC: 322 U/L (ref 39–117)
ALT SERPL W P-5'-P-CCNC: 11 U/L (ref 1–33)
ANION GAP SERPL CALCULATED.3IONS-SCNC: 7.9 MMOL/L (ref 5–15)
AST SERPL-CCNC: 22 U/L (ref 1–32)
BASOPHILS # BLD AUTO: 0.02 10*3/MM3 (ref 0–0.2)
BASOPHILS NFR BLD AUTO: 0.4 % (ref 0–1.5)
BILIRUB SERPL-MCNC: 0.3 MG/DL (ref 0–1.2)
BUN SERPL-MCNC: 25 MG/DL (ref 8–23)
BUN/CREAT SERPL: 6.7 (ref 7–25)
CALCIUM SPEC-SCNC: 8.6 MG/DL (ref 8.6–10.5)
CHLORIDE SERPL-SCNC: 99 MMOL/L (ref 98–107)
CO2 SERPL-SCNC: 32.1 MMOL/L (ref 22–29)
CREAT SERPL-MCNC: 3.72 MG/DL (ref 0.57–1)
DEPRECATED RDW RBC AUTO: 65.1 FL (ref 37–54)
EGFRCR SERPLBLD CKD-EPI 2021: 11.2 ML/MIN/1.73
EOSINOPHIL # BLD AUTO: 0.16 10*3/MM3 (ref 0–0.4)
EOSINOPHIL NFR BLD AUTO: 3.1 % (ref 0.3–6.2)
ERYTHROCYTE [DISTWIDTH] IN BLOOD BY AUTOMATED COUNT: 16.9 % (ref 12.3–15.4)
FERRITIN SERPL-MCNC: 1235 NG/ML (ref 13–150)
GLOBULIN UR ELPH-MCNC: 3.1 GM/DL
GLUCOSE SERPL-MCNC: 100 MG/DL (ref 65–99)
HCT VFR BLD AUTO: 43.6 % (ref 34–46.6)
HGB BLD-MCNC: 12.5 G/DL (ref 12–15.9)
IMM GRANULOCYTES # BLD AUTO: 0.01 10*3/MM3 (ref 0–0.05)
IMM GRANULOCYTES NFR BLD AUTO: 0.2 % (ref 0–0.5)
IRON 24H UR-MRATE: 44 MCG/DL (ref 37–145)
IRON SATN MFR SERPL: 25 % (ref 20–50)
LYMPHOCYTES # BLD AUTO: 0.45 10*3/MM3 (ref 0.7–3.1)
LYMPHOCYTES NFR BLD AUTO: 8.8 % (ref 19.6–45.3)
MCH RBC QN AUTO: 29.6 PG (ref 26.6–33)
MCHC RBC AUTO-ENTMCNC: 28.7 G/DL (ref 31.5–35.7)
MCV RBC AUTO: 103.3 FL (ref 79–97)
MONOCYTES # BLD AUTO: 0.76 10*3/MM3 (ref 0.1–0.9)
MONOCYTES NFR BLD AUTO: 14.8 % (ref 5–12)
NEUTROPHILS NFR BLD AUTO: 3.74 10*3/MM3 (ref 1.7–7)
NEUTROPHILS NFR BLD AUTO: 72.7 % (ref 42.7–76)
NRBC BLD AUTO-RTO: 0 /100 WBC (ref 0–0.2)
PLATELET # BLD AUTO: 101 10*3/MM3 (ref 140–450)
PMV BLD AUTO: 10.5 FL (ref 6–12)
POTASSIUM SERPL-SCNC: 5.2 MMOL/L (ref 3.5–5.2)
PROT SERPL-MCNC: 6.3 G/DL (ref 6–8.5)
RBC # BLD AUTO: 4.22 10*6/MM3 (ref 3.77–5.28)
SODIUM SERPL-SCNC: 139 MMOL/L (ref 136–145)
TIBC SERPL-MCNC: 177 MCG/DL (ref 298–536)
TRANSFERRIN SERPL-MCNC: 119 MG/DL (ref 200–360)
WBC NRBC COR # BLD AUTO: 5.14 10*3/MM3 (ref 3.4–10.8)

## 2025-03-18 PROCEDURE — 80053 COMPREHEN METABOLIC PANEL: CPT

## 2025-03-18 PROCEDURE — 82728 ASSAY OF FERRITIN: CPT

## 2025-03-18 PROCEDURE — 83540 ASSAY OF IRON: CPT

## 2025-03-18 PROCEDURE — 85025 COMPLETE CBC W/AUTO DIFF WBC: CPT

## 2025-03-18 PROCEDURE — 86334 IMMUNOFIX E-PHORESIS SERUM: CPT

## 2025-03-18 PROCEDURE — 36415 COLL VENOUS BLD VENIPUNCTURE: CPT

## 2025-03-18 PROCEDURE — 82784 ASSAY IGA/IGD/IGG/IGM EACH: CPT

## 2025-03-18 PROCEDURE — 84466 ASSAY OF TRANSFERRIN: CPT

## 2025-03-18 PROCEDURE — 84165 PROTEIN E-PHORESIS SERUM: CPT

## 2025-03-18 PROCEDURE — 83521 IG LIGHT CHAINS FREE EACH: CPT

## 2025-03-18 RX ORDER — LOSARTAN POTASSIUM 50 MG/1
50 TABLET ORAL 2 TIMES DAILY
Qty: 60 TABLET | Refills: 5 | Status: SHIPPED | OUTPATIENT
Start: 2025-03-18

## 2025-03-18 NOTE — PROGRESS NOTES
"Chief Complaint   Patient presents with    Hypertension     Routine follow up, Discuss HTN. Pt was taken off of her BP medication. Pt is only taking amlodipine 12.5 mg  as needed. Pt is self dosing BP medication as per Halima Quick. Pt does have headaches with blurry vision. Lab follow up.         Objective   Vital Signs  Vitals:    03/18/25 1152   BP: 150/82   BP Location: Right arm   Patient Position: Sitting   Pulse: 89   Temp: 98.2 °F (36.8 °C)   SpO2: 90%   Weight: 61.7 kg (136 lb)   Height: 165.1 cm (65\")      Body mass index is 22.63 kg/m².  Review of Systems General Weakness, lower extremity swelling on the left, persistent shortness of breath,  Physical Exam lungs are clear posterior with diminished breath sounds, her dialysis catheter is in place, she is got edema to the left lower leg diffusely, mainly in the lower leg around the ankle and lower PreTAB region compared to the right which shows no edema she is alert and oriented x 3 cardiac exam reveals a regular rhythm with a 2/6 to 3/6 systolic murmur neck is supple otherwise, she is wearing oxygen  Result Review :   Lab Results   Component Value Date    PROBNP 22,279.0 (H) 02/18/2025    PROBNP 28,306.0 (H) 01/14/2025    PROBNP 15,166.0 (H) 10/31/2024     09/09/2019     04/11/2019     CMP          2/1/2025    05:30 2/18/2025    10:11 3/18/2025    11:09   CMP   Glucose 87  88  100    BUN 22  22  25    Creatinine 3.31  3.99  3.72    EGFR 13.0  10.4  11.2    Sodium 135  143  139    Potassium 4.1  4.5  5.2    Chloride 103  101  99    Calcium 8.0  8.8  8.6    Total Protein  6.7  6.3    Albumin 2.7  3.2  3.2    Globulin  3.5  3.1    Total Bilirubin  0.3  0.3    Alkaline Phosphatase  305  322    AST (SGOT)  23  22    ALT (SGPT)  13  11    Albumin/Globulin Ratio  0.9  1.0    BUN/Creatinine Ratio 6.6  5.5  6.7    Anion Gap 8.7  8.2  7.9      CBC w/diff          2/26/2025    00:00 3/5/2025    00:00 3/12/2025    00:00   CBC w/Diff   Hemoglobin 10.4    "     31.2     11        33     11.7        35.1          Details          This result is from an external source.              Lipid Panel          6/27/2024    09:46 8/6/2024    09:26 2/18/2025    10:11   Lipid Panel   Total Cholesterol 126  94  110    Triglycerides 55  69  61    HDL Cholesterol 88  54  65    VLDL Cholesterol 12  15  14    LDL Cholesterol  26  25  31    LDL/HDL Ratio 0.31  0.49  0.50       Lab Results   Component Value Date    TSH 1.800 10/31/2024    TSH 0.724 08/06/2024    TSH 7.250 (H) 06/27/2024      Lab Results   Component Value Date    FREET4 1.05 10/31/2024    FREET4 1.40 08/06/2024    FREET4 1.28 06/27/2024      A1C Last 3 Results          10/31/2024    11:00 1/13/2025    13:29 1/15/2025    00:00   HGBA1C Last 3 Results   Hemoglobin A1C 5.20  4.80  5.5          Details          This result is from an external source.                               Visit Diagnoses:    ICD-10-CM ICD-9-CM   1. 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 Ex-smoker  Z87.891 V15.82   3. Hypoxia  R09.02 799.02   4. Malignant neoplasm of lower lobe of left lung  C34.32 162.5   5. Aortic stenosis, moderate  I35.0 424.1   6. Essential hypertension  I10 401.9   7. Chronic heart failure with preserved ejection fraction  I50.32 428.9   8. Malfunction of arteriovenous graft, sequela  T82.590S 909.3   9. Vitamin D deficiency  E55.9 268.9   10. ESRD (end stage renal disease) on dialysis  N18.6 585.6    Z99.2 V45.11   11. Frailty syndrome in geriatric patient  R54 797   12. Weakness  R53.1 780.79       Assessment and Plan   Diagnoses and all orders for this visit:    1. Malignant neoplasm of upper lobe of right lung (Primary)  -     CBC & Differential; Future  -     Comprehensive Metabolic Panel; Future  -     TSH+Free T4; Future  -     Iron Profile; Future    2. Ex-smoker  -     CBC & Differential; Future  -     Comprehensive Metabolic Panel; Future  -     TSH+Free T4; Future  -     Iron Profile;  Future    3. Hypoxia  -     CBC & Differential; Future  -     Comprehensive Metabolic Panel; Future  -     TSH+Free T4; Future  -     Iron Profile; Future    4. Malignant neoplasm of lower lobe of left lung  -     CBC & Differential; Future  -     Comprehensive Metabolic Panel; Future  -     TSH+Free T4; Future  -     Iron Profile; Future    5. Aortic stenosis, moderate  -     CBC & Differential; Future  -     Comprehensive Metabolic Panel; Future  -     TSH+Free T4; Future  -     Iron Profile; Future    6. Essential hypertension  -     CBC & Differential; Future  -     Comprehensive Metabolic Panel; Future  -     TSH+Free T4; Future  -     Iron Profile; Future    7. Chronic heart failure with preserved ejection fraction  -     CBC & Differential; Future  -     Comprehensive Metabolic Panel; Future  -     TSH+Free T4; Future  -     Iron Profile; Future    8. Malfunction of arteriovenous graft, sequela  -     CBC & Differential; Future  -     Comprehensive Metabolic Panel; Future  -     TSH+Free T4; Future  -     Iron Profile; Future    9. Vitamin D deficiency  -     CBC & Differential; Future  -     Comprehensive Metabolic Panel; Future  -     TSH+Free T4; Future  -     Iron Profile; Future    10. ESRD (end stage renal disease) on dialysis  -     CBC & Differential; Future  -     Comprehensive Metabolic Panel; Future  -     TSH+Free T4; Future  -     Iron Profile; Future    11. Frailty syndrome in geriatric patient  -     CBC & Differential; Future  -     Comprehensive Metabolic Panel; Future  -     TSH+Free T4; Future  -     Iron Profile; Future    12. Weakness  -     CBC & Differential; Future  -     Comprehensive Metabolic Panel; Future  -     TSH+Free T4; Future  -     Iron Profile; Future    Other orders  -     losartan (Cozaar) 50 MG tablet; Take 1 tablet by mouth 2 (Two) Times a Day.  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 Mckeon has another follow-up CT scan coming up April May 2025,     Transitional care visit hospital admission August 21 to August 26, 2024 with multiple consultants and diagnosis of acute on chronic hypoxic and hypercapnic respiratory failure ,   pulmonary edema,    nonsustained V. tach with hypertension urgency emergency, continued on dialysis,============= hospital course, she came after having a tunneled dialysis catheter removal, and she was going to go to dialysis following her removal for a session on her new AV fistula, she became confused somnolent hypoxic after the procedure a CODE BLUE was called but she never lost a pulse, patient was intubated for airway protection she underwent dialysis with removal of 4 L overnight with improvement in oxygenation she was easily extubated the next day and she monitored for another 48 hours on dialysis, because of some nonsustained V. tach cardiology saw the patient but did not pursue any further treatment options, and they thought she might need an event monitor as an outpatient    Annual wellness visit completed July 16, 2024    Back pain, patient was asking for some pain medications discussed our role in neck care, can make referral to pain management, for possible narcotic usage on a long-term basis,    Generalized weakness,   goes to dialysis 3 days a week,    Frailty     End-stage renal disease ----on dialysis, tunneled dialysis catheter  removed August 21 22nd 2024, now with AV fistula left upper extremity being used but clotted, February March 2025 now with new tunneled dialysis catheter in the right chest wall March 18, 2025    Hypoxia continues oxygen,    Previous pneumonia, question about continuing her tobramycin nebulizer solutions?  July 16, 2024 currently off this medication    Diabetes, would stop Tradjenta given  hemoglobin A1c down to 5.2 October 31, 2024    Coronary artery disease, continues carvedilol 12.5 mg twice a day, aspirin 81 mg daily, Cozaar 25 mg daily, Imdur 30 mg daily--, --amlodipine 2.5 as needed,=====- they stopped hydralazine-in the hospital this time March 2025,-does follow-up with [facility]    GERD, continues Dexilant 60 mg daily     COPD, continues Brovana nebs twice a day, DuoNebs 4 times a day, ProAir inhaler as needed, Pulmicort nebs twice a day, tobramycin nebulizer    Hypertension treated as above blood pressures have been going up again, March 2025 off hydralazine as above    Anemia ==chronic disease state === labs reviewed March 18, 2025    PAD    Hyperlipidemia, continues rosuvastatin 20 mg daily    Lung cancer history--2005, chemo x 3 for right side, and partial lobectomy on the left side for recurrence of cancer,           Follow Up   Return in about 4 months (around 7/18/2025).  Patient was given instructions and counseling regarding her condition or for health maintenance advice. Please see specific information pulled into the AVS if appropriate.

## 2025-03-18 NOTE — PROGRESS NOTES
Patient rounding phone call attempt made on 3/13, 3/17,  and 3/18/25.  Unable to make contact with patient.

## 2025-03-20 ENCOUNTER — APPOINTMENT (OUTPATIENT)
Dept: LAB | Facility: HOSPITAL | Age: 89
End: 2025-03-20
Payer: MEDICARE

## 2025-03-20 ENCOUNTER — LAB (OUTPATIENT)
Dept: LAB | Facility: HOSPITAL | Age: 89
End: 2025-03-20
Payer: MEDICARE

## 2025-03-20 DIAGNOSIS — D63.1 ANEMIA DUE TO CHRONIC KIDNEY DISEASE, ON CHRONIC DIALYSIS: ICD-10-CM

## 2025-03-20 DIAGNOSIS — N18.6 ANEMIA DUE TO CHRONIC KIDNEY DISEASE, ON CHRONIC DIALYSIS: ICD-10-CM

## 2025-03-20 DIAGNOSIS — E83.41 HYPERMAGNESEMIA: ICD-10-CM

## 2025-03-20 DIAGNOSIS — Z99.2 ANEMIA DUE TO CHRONIC KIDNEY DISEASE, ON CHRONIC DIALYSIS: ICD-10-CM

## 2025-03-20 LAB
ALBUMIN SERPL ELPH-MCNC: 3.1 G/DL (ref 2.9–4.4)
ALBUMIN/GLOB SERPL: 1.2 {RATIO} (ref 0.7–1.7)
ALPHA1 GLOB SERPL ELPH-MCNC: 0.3 G/DL (ref 0–0.4)
ALPHA2 GLOB SERPL ELPH-MCNC: 0.6 G/DL (ref 0.4–1)
B-GLOBULIN SERPL ELPH-MCNC: 0.8 G/DL (ref 0.7–1.3)
BASOPHILS # BLD AUTO: 0.03 10*3/MM3 (ref 0–0.2)
BASOPHILS NFR BLD AUTO: 0.6 % (ref 0–1.5)
DEPRECATED RDW RBC AUTO: 65.2 FL (ref 37–54)
EOSINOPHIL # BLD AUTO: 0.11 10*3/MM3 (ref 0–0.4)
EOSINOPHIL NFR BLD AUTO: 2.1 % (ref 0.3–6.2)
ERYTHROCYTE [DISTWIDTH] IN BLOOD BY AUTOMATED COUNT: 16.9 % (ref 12.3–15.4)
FERRITIN SERPL-MCNC: 1506 NG/ML (ref 13–150)
FOLATE SERPL-MCNC: 8.6 NG/ML (ref 4.78–24.2)
GAMMA GLOB SERPL ELPH-MCNC: 1.1 G/DL (ref 0.4–1.8)
GLOBULIN SER-MCNC: 2.8 G/DL (ref 2.2–3.9)
HCT VFR BLD AUTO: 44 % (ref 34–46.6)
HGB BLD-MCNC: 12.4 G/DL (ref 12–15.9)
IGA SERPL-MCNC: 395 MG/DL (ref 64–422)
IGG SERPL-MCNC: 1036 MG/DL (ref 586–1602)
IGM SERPL-MCNC: 96 MG/DL (ref 26–217)
IMM GRANULOCYTES # BLD AUTO: 0.01 10*3/MM3 (ref 0–0.05)
IMM GRANULOCYTES NFR BLD AUTO: 0.2 % (ref 0–0.5)
INTERPRETATION SERPL IEP-IMP: ABNORMAL
IRON 24H UR-MRATE: 56 MCG/DL (ref 37–145)
IRON SATN MFR SERPL: 26 % (ref 20–50)
KAPPA LC FREE SER-MCNC: 127.7 MG/L (ref 3.3–19.4)
KAPPA LC FREE/LAMBDA FREE SER: 1.27 {RATIO} (ref 0.26–1.65)
LABORATORY COMMENT REPORT: ABNORMAL
LAMBDA LC FREE SERPL-MCNC: 100.3 MG/L (ref 5.7–26.3)
LYMPHOCYTES # BLD AUTO: 0.52 10*3/MM3 (ref 0.7–3.1)
LYMPHOCYTES NFR BLD AUTO: 10 % (ref 19.6–45.3)
M PROTEIN SERPL ELPH-MCNC: ABNORMAL G/DL
MAGNESIUM SERPL-MCNC: 2.4 MG/DL (ref 1.6–2.4)
MCH RBC QN AUTO: 29.6 PG (ref 26.6–33)
MCHC RBC AUTO-ENTMCNC: 28.2 G/DL (ref 31.5–35.7)
MCV RBC AUTO: 105 FL (ref 79–97)
MONOCYTES # BLD AUTO: 0.77 10*3/MM3 (ref 0.1–0.9)
MONOCYTES NFR BLD AUTO: 14.8 % (ref 5–12)
NEUTROPHILS NFR BLD AUTO: 3.77 10*3/MM3 (ref 1.7–7)
NEUTROPHILS NFR BLD AUTO: 72.3 % (ref 42.7–76)
NRBC BLD AUTO-RTO: 0 /100 WBC (ref 0–0.2)
PLATELET # BLD AUTO: 113 10*3/MM3 (ref 140–450)
PMV BLD AUTO: 10.5 FL (ref 6–12)
PROT SERPL-MCNC: 5.9 G/DL (ref 6–8.5)
RBC # BLD AUTO: 4.19 10*6/MM3 (ref 3.77–5.28)
TIBC SERPL-MCNC: 215 MCG/DL (ref 298–536)
TRANSFERRIN SERPL-MCNC: 144 MG/DL (ref 200–360)
VIT B12 BLD-MCNC: 716 PG/ML (ref 211–946)
WBC NRBC COR # BLD AUTO: 5.21 10*3/MM3 (ref 3.4–10.8)

## 2025-03-20 PROCEDURE — 83735 ASSAY OF MAGNESIUM: CPT

## 2025-03-20 PROCEDURE — 36415 COLL VENOUS BLD VENIPUNCTURE: CPT

## 2025-03-20 PROCEDURE — 85025 COMPLETE CBC W/AUTO DIFF WBC: CPT

## 2025-03-20 PROCEDURE — 84466 ASSAY OF TRANSFERRIN: CPT

## 2025-03-20 PROCEDURE — 82728 ASSAY OF FERRITIN: CPT

## 2025-03-20 PROCEDURE — 82607 VITAMIN B-12: CPT

## 2025-03-20 PROCEDURE — 83540 ASSAY OF IRON: CPT

## 2025-03-20 PROCEDURE — 82746 ASSAY OF FOLIC ACID SERUM: CPT

## 2025-03-25 ENCOUNTER — OFFICE VISIT (OUTPATIENT)
Dept: ONCOLOGY | Facility: HOSPITAL | Age: 89
End: 2025-03-25
Payer: MEDICARE

## 2025-03-25 VITALS
TEMPERATURE: 97.8 F | RESPIRATION RATE: 16 BRPM | OXYGEN SATURATION: 98 % | SYSTOLIC BLOOD PRESSURE: 166 MMHG | HEART RATE: 69 BPM | BODY MASS INDEX: 22.63 KG/M2 | WEIGHT: 135.8 LBS | DIASTOLIC BLOOD PRESSURE: 52 MMHG | HEIGHT: 65 IN

## 2025-03-25 DIAGNOSIS — R76.9 ABNORMAL SERUM IMMUNOELECTROPHORESIS: Primary | ICD-10-CM

## 2025-03-25 DIAGNOSIS — N18.6 ESRD (END STAGE RENAL DISEASE): ICD-10-CM

## 2025-03-25 DIAGNOSIS — Z99.2 ANEMIA DUE TO CHRONIC KIDNEY DISEASE, ON CHRONIC DIALYSIS: ICD-10-CM

## 2025-03-25 DIAGNOSIS — N18.6 ANEMIA DUE TO CHRONIC KIDNEY DISEASE, ON CHRONIC DIALYSIS: ICD-10-CM

## 2025-03-25 DIAGNOSIS — D63.1 ANEMIA DUE TO CHRONIC KIDNEY DISEASE, ON CHRONIC DIALYSIS: ICD-10-CM

## 2025-03-25 DIAGNOSIS — C34.32 MALIGNANT NEOPLASM OF LOWER LOBE OF LEFT LUNG: ICD-10-CM

## 2025-03-25 PROCEDURE — 1159F MED LIST DOCD IN RCRD: CPT | Performed by: NURSE PRACTITIONER

## 2025-03-25 PROCEDURE — 1125F AMNT PAIN NOTED PAIN PRSNT: CPT | Performed by: NURSE PRACTITIONER

## 2025-03-25 PROCEDURE — 1160F RVW MEDS BY RX/DR IN RCRD: CPT | Performed by: NURSE PRACTITIONER

## 2025-03-25 PROCEDURE — G0463 HOSPITAL OUTPT CLINIC VISIT: HCPCS | Performed by: NURSE PRACTITIONER

## 2025-03-25 PROCEDURE — 99214 OFFICE O/P EST MOD 30 MIN: CPT | Performed by: NURSE PRACTITIONER

## 2025-03-25 RX ORDER — FERROUS GLUCONATE 324(38)MG
324 TABLET ORAL 3 TIMES WEEKLY
Qty: 60 TABLET | Refills: 1 | Status: SHIPPED | OUTPATIENT
Start: 2025-03-26

## 2025-03-25 NOTE — PROGRESS NOTES
Chief Complaint/Reason for Referral:  Follow-up (anemia), IgM MGUS,     Brennan Mosqueda,*  Brennan Mosqueda MD    Records Obtained:  Records of the patients history including those obtained from [facility] and patient information were reviewed and summarized in detail.    Subjective    History of Present Illness  The patient is a very pleasant 89-year-old  female who presents for follow-up for IgM MGUS, anemia, and right lung squamous cell carcinoma. She also has a history of end-stage renal disease and congestive heart failure. She saw Dr. Medrano  back in 02/2025.    She completed her radiation therapy for lung cancer approximately 2 to 3 weeks ago, which was generally well-tolerated. However, she experienced significant weakness post-therapy, which has since shown improvement. Dr. Mckeon has scheduled scans to be conducted in 2 months.    She is under the care of Dr. García, a nephrologist, and undergoes dialysis on Mondays, Wednesdays, and Fridays. Despite being informed that she would receive injections for her anemia during dialysis, this treatment has not yet been initiated. She reports no instances of bleeding. She has been on a daily over-the-counter iron supplement regimen for several months, which has proven beneficial.    She is also under the care of a cardiologist for her congestive heart failure.    MEDICATIONS  Iron 1 tab QD to be decreased to 1 tab on Tues, Thurs, Saturday.     IMMUNIZATIONS  She received her influenza vaccine in 10/2024.    Results  Laboratory Studies  Recent lab work from 03/20/2025 shows white count is normal. Hemoglobin improved from 11.7 to 12.4. Platelets are mildly low at 113. MCV level is 105. Creatinine is 3.72 and GFR is 11.2. Iron level is 56. B12 and folate levels are normal. Light chains are elevated, but the ratio is normal, IgM MGUS. No recommendation for bone marrow  biopsy at this time.          Cancer Staging   Malignant neoplasm of lower lobe of  left lung  Staging form: Lung, AJCC V9  - Clinical: cT2, cN0, cM0 - Signed by Brennan Medrano MD on 2/6/2025       Treatment intent: curative    Oncology/Hematology History   Malignant neoplasm of upper lobe of right lung   6/7/2021 Initial Diagnosis    Malignant neoplasm of upper lobe of right lung (CMS/HCC)     6/7/2021 -  Radiation    RADIATION THERAPY Treatment Details (Noted on 6/7/2021)  Site: Right Lung - Upper lobe  Technique: SBRT  Goal: Curative  Planned Treatment Start Date: No planned start date specified     6/7/2021 -  Radiation    RADIATION THERAPY Treatment Details (Noted on 6/7/2021)  Site: Right Lung - Upper lobe  Technique: SBRT  Goal: No goal specified  Planned Treatment Start Date: No planned start date specified     6/7/2021 -  Radiation    RADIATION THERAPY Treatment Details (Noted on 6/7/2021)  Site: Right Lung - Upper lobe  Technique: SBRT  Goal: Curative  Planned Treatment Start Date: No planned start date specified     6/7/2021 -  Radiation    RADIATION THERAPY Treatment Details (Noted on 6/7/2021)  Site: Right Lung - Upper lobe  Technique: SBRT  Goal: No goal specified  Planned Treatment Start Date: No planned start date specified     Malignant neoplasm of lower lobe of left lung   2/6/2025 Initial Diagnosis    Malignant neoplasm of lower lobe of left lung     2/6/2025 Cancer Staged    Staging form: Lung, AJCC V9  - Clinical: cT2, cN0, cM0 - Signed by Brennan Medrano MD on 2/6/2025     Malignant neoplasm of lower lobe, right bronchus or lung   2/17/2025 Initial Diagnosis    Malignant neoplasm of lower lobe, right bronchus or lung     2/17/2025 -  Radiation    RADIATION THERAPY Treatment Details (Noted on 2/17/2025)  Site: Right Lung  Technique: SBRT  Goal: No goal specified  Planned Treatment Start Date: No planned start date specified         Review of Systems   Constitutional:  Positive for fatigue.   Musculoskeletal:  Positive for back pain.   All other systems reviewed  and are negative.      Current Outpatient Medications on File Prior to Visit   Medication Sig Dispense Refill    acetaminophen (TYLENOL) 325 MG tablet Take 2 tablets by mouth Every 6 (Six) Hours As Needed for Mild Pain.      albuterol sulfate  (90 Base) MCG/ACT inhaler Inhale 2 puffs Every 4 (Four) Hours As Needed for Wheezing. 18 g 11    aspirin 81 MG EC tablet Take 1 tablet by mouth Daily.      budesonide (Pulmicort) 0.5 MG/2ML nebulizer solution Take 2 mL by nebulization 2 (Two) Times a Day. 60 each 5    carvedilol (COREG) 25 MG tablet Take 1 tablet by mouth 2 (Two) Times a Day With Meals. 180 tablet 3    clopidogrel (Plavix) 75 MG tablet Take 1 tablet by mouth Daily. 90 tablet 3    dexlansoprazole (DEXILANT) 60 MG capsule Take 1 capsule by mouth Daily. 90 capsule 3    ferrous sulfate 325 (65 FE) MG tablet Take 1 tablet by mouth Daily With Breakfast.      fluticasone (FLONASE) 50 MCG/ACT nasal spray Administer 1 spray into the nostril(s) as directed by provider Daily.      furosemide (LASIX) 20 MG tablet Take 1 tablet by mouth Daily. 90 tablet 3    ipratropium-albuterol (DUO-NEB) 0.5-2.5 mg/3 ml nebulizer Take 3 mL by nebulization Every 6 (Six) Hours As Needed for Wheezing. 360 mL 2    isosorbide mononitrate (IMDUR) 30 MG 24 hr tablet Take 1 tablet by mouth Every Night. 90 tablet 3    levocetirizine (XYZAL) 5 MG tablet TAKE 1 TABLET DAILY 90 tablet 1    levothyroxine (SYNTHROID, LEVOTHROID) 50 MCG tablet Take 1 tablet by mouth Daily.      lidocaine-prilocaine (EMLA) 2.5-2.5 % cream Apply 1 Application topically to the appropriate area as directed Take As Directed. Apply to dialysis access site 30-45 minutes prior to dialysis      losartan (Cozaar) 50 MG tablet Take 1 tablet by mouth 2 (Two) Times a Day. 60 tablet 5    rosuvastatin (CRESTOR) 20 MG tablet Take 1 tablet by mouth Every Night. 90 tablet 3    sevelamer (RENVELA) 800 MG tablet Take 1 tablet by mouth 3 (Three) Times a Day With Meals. 270 tablet 3     tobramycin PF (JUAN) 300 MG/5ML nebulizer solution Take 5 mL by nebulization 2 (Two) Times a Day. 30 days on 30 days off 300 mL 6    vitamin D (ERGOCALCIFEROL) 1.25 MG (59347 UT) capsule capsule Take 1 capsule by mouth Every 14 (Fourteen) Days. (Patient taking differently: Take 1 capsule by mouth Every 14 (Fourteen) Days. The 15th and 30th of the month) 6 capsule 3    losartan (Cozaar) 25 MG tablet Take 1 tablet by mouth Every Night. Half tab for one week then 1 tab if SBP is greater than 140 (Patient not taking: Reported on 3/25/2025) 90 tablet 0    Psyllium ([employer] DAILY FIBER PO) Take 2 tablets by mouth Daily.       No current facility-administered medications on file prior to visit.       No Known Allergies  Past Medical History:   Diagnosis Date    Allergic rhinitis     Anaphylactic shock, unspecified, initial encounter 12/05/2023    Anemia     Arthritis     Asthma     USE INHALERS AND NEBULIZERS    Back pain     Bladder disorder     Cancer     LEFT LUNG CANCER 2005 SURGERY AND CHEMO DONE  AND CURRENTLY 4/ 14/22  RIGHT LUNG CANCER HAS ONLY RECEIVED RADIATION THUS FAR    Chronic kidney disease     stage 3    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 DENIES CP BUT DOES HAVE SOA CHRONIC ISSUE COPD/LUNG CANCER    COPD (chronic obstructive pulmonary disease)     FOLLOWED BY DR MARIAJOSE BAILEY    Coronary artery disease     DENIES CP BUT DOES GET SOA MOST OF THE TIME WITH EXERTION BUT OCC AT REST CHRONIC ISSUE COPD/LUNG CANCER    Deep vein thrombosis     Diabetes mellitus     DOES NOT CHECK BS DAILY    Disease of thyroid gland     HYPOTHYROIDISM    Essential hypertension     Gastric ulcer     GERD (gastroesophageal reflux disease)     Heart murmur     History of transfusion     NO ISSUES POST TRANSFUSION WAS MANY YEARS AGO    Hyperlipemia     Leukocytopenia      FOLLOWED BY DR MIR BAILEY    Limb swelling     Lumbago     Lumbar spinal stenosis     Lung cancer     Migraine headache     Multiple joint pain     On home oxygen therapy     4L/NC PRN    Osteopenia     PNA (pneumonia) 05/04/2024    Pseudomonas pneumonia 04/29/2024    Reflux esophagitis     Shortness of breath     Thyroid nodule     HAS ULTRASOUND YEARLY BEING MONITORED    Vascular disease     Vitamin D deficiency      Past Surgical History:   Procedure Laterality Date    ABDOMINAL SURGERY      ANGIOPLASTY RENAL ARTERY Left 06/14/2024    stent placement    APPENDECTOMY      ARTERIOGRAM N/A 6/14/2024    Procedure: Arteriogram, right radial access, aortogram and renal arteriogram with possible intervention.;  Surgeon: Dio Miguel MD;  Location: [facility] CATH INVASIVE LOCATION;  Service: Peripheral Vascular;  Laterality: N/A;    ARTERIOVENOUS FISTULA/SHUNT SURGERY Left 05/10/2022    Procedure: Left basilic vein transposition;  Surgeon: Wan Barksdale MD;  Location: [facility] MAIN OR;  Service: Vascular;  Laterality: Left;    ARTERIOVENOUS FISTULA/SHUNT SURGERY Left 03/23/2023    Procedure: Ligation of left arm arteriovenous fistula;  Surgeon: Wan Barksdale MD;  Location: [facility] MAIN OR;  Service: Vascular;  Laterality: Left;    ARTERIOVENOUS FISTULA/SHUNT SURGERY Left 11/30/2023    Procedure: Creation of left arm arteriovenous graft;  Surgeon: Wan Barksdale MD;  Location: [facility] MAIN OR;  Service: Vascular;  Laterality: Left;    BRONCHOSCOPY N/A 04/24/2024    Procedure: BRONCHOSCOPY WITH BAL AND WASHINGS;  Surgeon: Khalif Yanez MD;  Location: [facility] ENDOSCOPY;  Service: Pulmonary;  Laterality: N/A;  MUCUS PLUGGING    CARDIAC CATHETERIZATION  1996    CARDIAC SURGERY      CARDIAC SURGERY      fluid drained from heart    CATARACT EXTRACTION, BILATERAL  2003    COLONOSCOPY  2014    ENDOSCOPY  2016 2019    FEMORAL ARTERY STENT Bilateral     HYSTERECTOMY      LEG THROMBECTOMY/EMBOLECTOMY Left 1/29/2025    Procedure:  LEFT LOWER EXTREMITY ANGIOGRAM;  Surgeon: Jaden Castañeda MD;  Location: [facility] HYBRID OR;  Service: Vascular;  Laterality: Left;    LUNG BIOPSY Left     lobectomy upper lung caner    LUNG VOLUME REDUCTION      OTHER SURGICAL HISTORY      artifical joints/limbs    REPLACEMENT TOTAL KNEE Left     SHUNT O GRAM N/A 2025    Procedure: dialysis shuntogram;  Surgeon: Court Valente MD;  Location: [facility] CATH INVASIVE LOCATION;  Service: Peripheral Vascular;  Laterality: N/A;    UPPER GASTROINTESTINAL ENDOSCOPY       Social History     Socioeconomic History    Marital status:    Tobacco Use    Smoking status: Former     Current packs/day: 0.00     Average packs/day: 1 pack/day for 52.5 years (52.5 ttl pk-yrs)     Types: Cigarettes     Start date:      Quit date: 2004     Years since quittin.7     Passive exposure: Past    Smokeless tobacco: Never   Vaping Use    Vaping status: Never Used   Substance and Sexual Activity    Alcohol use: Not Currently     Comment: beer many years ago    Drug use: Never    Sexual activity: Defer     Family History   Problem Relation Age of Onset    Arthritis Mother     Arthritis Father     Cancer Brother     Diabetes Brother     Other Brother         blood disease     Prostate cancer Brother     Cancer Brother     Prostate cancer Brother     Cancer Brother     Cancer Brother     Malig Hyperthermia Neg Hx     Colon cancer Neg Hx      Immunization History   Administered Date(s) Administered    ABRYSVO (RSV, 60+ or pregnant women 32-36 wks) 2024    COVID-19 (PFIZER) 12YRS+ (COMIRNATY) 10/19/2023, 10/10/2024    COVID-19 (PFIZER) BIVALENT 12+YRS 10/13/2022    COVID-19 (PFIZER) Purple Cap Monovalent 2021, 2021, 10/31/2021    FLUAD TRI 65YR+ 10/10/2024    Fluad Quad 65+ 10/19/2023    Fluzone (or Fluarix & Flulaval for VFC) >6mos 2016    Fluzone High-Dose 65+YRS 10/09/2018, 10/15/2020, 10/12/2021, 2022    Hepatitis B 2 Dose Vaccine  "Heplisav-B 08/28/2024, 09/23/2024, 10/23/2024, 12/23/2024    Influenza Seasonal Injectable 10/01/2017, 10/01/2018    Influenza, Unspecified 10/13/2021    Pneumococcal Conjugate 13-Valent (PCV13) 10/21/2015    Pneumococcal Conjugate 20-Valent (PCV20) 08/30/2024    Pneumococcal Polysaccharide (PPSV23) 10/30/2003, 11/08/2016       Tobacco Use: Medium Risk (3/25/2025)    Patient History     Smoking Tobacco Use: Former     Smokeless Tobacco Use: Never     Passive Exposure: Past       Objective     Physical Exam    Vitals:    03/25/25 1111   BP: 166/52   Pulse: 69   Resp: 16   Temp: 97.8 °F (36.6 °C)   TempSrc: Temporal   SpO2: 98%   Weight: 61.6 kg (135 lb 12.8 oz)   Height: 165.1 cm (65\")   PainSc: 6    PainLoc: Back       Wt Readings from Last 3 Encounters:   03/25/25 61.6 kg (135 lb 12.8 oz)   03/18/25 61.7 kg (136 lb)   03/03/25 63 kg (138 lb 14.2 oz)        BMI is within normal parameters. No other follow-up for BMI required.       ECOG score: 2         ECOG: (1) Restricted in Physically Strenuous Activity, Ambulatory & Able to Do Work of Light Nature  Fall Risk Assessment was completed, and patient is at moderate risk for falls.  PHQ-9 Total Score:         The patient is  experiencing fatigue. Fatigue score: 8    PT/OT Functional Screening: PT fx screen : Weakness  Speech Functional Screening: Speech fx screen : No needs identified  Rehab to be ordered: Rehab to be ordered : No needs identified        Result Review :  The following data was reviewed by: DAT Ruth on 03/25/2025:  Lab Results   Component Value Date    HGB 12.4 03/20/2025    HCT 44.0 03/20/2025    .0 (H) 03/20/2025     (L) 03/20/2025    WBC 5.21 03/20/2025    NEUTROABS 3.77 03/20/2025    LYMPHSABS 0.52 (L) 03/20/2025    MONOSABS 0.77 03/20/2025    EOSABS 0.11 03/20/2025    BASOSABS 0.03 03/20/2025     Lab Results   Component Value Date    GLUCOSE 100 (H) 03/18/2025    BUN 25 (H) 03/18/2025    CREATININE 3.72 (H) " "03/18/2025     03/18/2025    K 5.2 03/18/2025    CL 99 03/18/2025    CO2 32.1 (H) 03/18/2025    CALCIUM 8.6 03/18/2025    PROTEINTOT 6.3 03/18/2025    PROTEINTOT 5.9 (L) 03/18/2025    ALBUMIN 3.2 (L) 03/18/2025    ALBUMIN 3.1 03/18/2025    BILITOT 0.3 03/18/2025    ALKPHOS 322 (H) 03/18/2025    AST 22 03/18/2025    ALT 11 03/18/2025     Lab Results   Component Value Date     11/02/2023    Ferritin 1,506.00 (H) 03/20/2025    Ferritin 1,323 (H) 04/05/2024    Folate 8.60 03/20/2025    Folate 11.9 07/17/2024     Lab Results   Component Value Date    IRON 56 03/20/2025    LABIRON 26 03/20/2025    TRANSFERRIN 144 (L) 03/20/2025    TIBC 215 (L) 03/20/2025     Lab Results   Component Value Date     11/02/2023    FERRITIN 1,506.00 (H) 03/20/2025    DCVWQMMG96 716 03/20/2025    FOLATE 8.60 03/20/2025     No results found for: \"PSA\", \"CEA\", \"AFP\", \"\", \"\"         Assessment and Plan:  Diagnoses and all orders for this visit:    1. Abnormal serum immunoelectrophoresis (Primary)  -     Cancel: CBC & Differential; Future  -     ferrous gluconate (FERGON) 324 MG tablet; Take 1 tablet by mouth 3 (Three) Times a Week.  Dispense: 60 tablet; Refill: 1  -     Cancel: Iron Profile; Future  -     Cancel: Ferritin; Future  -     Cancel: Comprehensive Metabolic Panel; Future  -     CBC & Differential; Future  -     Comprehensive Metabolic Panel; Future  -     Ferritin; Future  -     Iron Profile; Future  -     SHERIN, PE & Free LT Chains, Ser; Future    2. Anemia due to chronic kidney disease, on chronic dialysis  -     Cancel: CBC & Differential; Future  -     ferrous gluconate (FERGON) 324 MG tablet; Take 1 tablet by mouth 3 (Three) Times a Week.  Dispense: 60 tablet; Refill: 1  -     Cancel: Iron Profile; Future  -     Cancel: Ferritin; Future  -     Cancel: Comprehensive Metabolic Panel; Future  -     CBC & Differential; Future  -     Comprehensive Metabolic Panel; Future  -     Ferritin; Future  -     Iron " Profile; Future    3. Malignant neoplasm of lower lobe of left lung  -     Cancel: CBC & Differential; Future  -     ferrous gluconate (FERGON) 324 MG tablet; Take 1 tablet by mouth 3 (Three) Times a Week.  Dispense: 60 tablet; Refill: 1  -     Cancel: Iron Profile; Future  -     Cancel: Ferritin; Future  -     Cancel: Comprehensive Metabolic Panel; Future  -     CBC & Differential; Future  -     Comprehensive Metabolic Panel; Future  -     Ferritin; Future  -     Iron Profile; Future    4. ESRD (end stage renal disease)  -     Cancel: CBC & Differential; Future  -     ferrous gluconate (FERGON) 324 MG tablet; Take 1 tablet by mouth 3 (Three) Times a Week.  Dispense: 60 tablet; Refill: 1  -     Cancel: Iron Profile; Future  -     Cancel: Ferritin; Future  -     Cancel: Comprehensive Metabolic Panel; Future  -     CBC & Differential; Future  -     Comprehensive Metabolic Panel; Future  -     Ferritin; Future  -     Iron Profile; Future        Assessment & Plan  1. Anemia: improving.   Her anemia is likely secondary to chronic kidney disease and congestive heart failure. Recent labs show a hemoglobin level of 12.4, which is within the normal range. She has been taking over-the-counter iron once a day, which appears to be helping. She will reduce her iron intake to three times a week, specifically on Tuesday, Thursday, and Saturday. Labs will be ordered to be conducted one week prior to her 3-month follow-up with Dr. Medrano. Folate and B-12 are normal.     2. Chronic Kidney Disease (CKD).  She has stage IV CKD with a creatinine level of 3.72 and a GFR of 11.2. She is currently under the care of Dr. García for nephrology and undergoes dialysis on Monday, Wednesday, and Friday.    3. Right Lung Squamous Cell Carcinoma.  She completed radiation therapy approximately 2-3 weeks ago but experienced significant weakness post-therapy, which has since shown improvement. Follow-up scans are planned in about 2 months as per   Mckeon's recommendation.    4. Congestive Heart Failure.  She is under the care of a cardiologist for congestive heart failure.    5. Monoclonal Gammopathy of Undetermined Significance (MGUS); IgM MGUS  Her MGUS is stable with no M spike detected, K/L light chains are both elevated but the ratio is normal. Likely the K/L light chain elevation is secondary to the stage CKD. The condition is not related to her chronic kidney disease or anemia. Anemia is stable and improved. No hypercalcemia noted. No lytic lesions on PET scan.         I spent 35 minutes caring for Charlette on this date of service. This time includes time spent by me in the following activities:preparing for the visit, reviewing tests, obtaining and/or reviewing a separately obtained history, performing a medically appropriate examination and/or evaluation , counseling and educating the patient/family/caregiver, ordering medications, tests, or procedures, referring and communicating with other health care professionals , documenting information in the medical record, and independently interpreting results and communicating that information with the patient/family/caregiver    Patient Follow Up: 3 months with MD. Labs 1 week prior.     Patient was given instructions and counseling regarding her condition or for health maintenance advice. Please see specific information pulled into the AVS if appropriate.     DAT Ruth    3/25/2025    .tob    Patient or patient representative verbalized consent for the use of Ambient Listening during the visit with  DAT Ruth for chart documentation. 3/25/2025  12:14 EDT

## 2025-04-01 ENCOUNTER — OFFICE VISIT (OUTPATIENT)
Dept: CARDIOLOGY | Facility: CLINIC | Age: 89
End: 2025-04-01
Payer: MEDICARE

## 2025-04-01 VITALS
BODY MASS INDEX: 23.53 KG/M2 | DIASTOLIC BLOOD PRESSURE: 70 MMHG | WEIGHT: 141.2 LBS | HEART RATE: 75 BPM | SYSTOLIC BLOOD PRESSURE: 144 MMHG | HEIGHT: 65 IN

## 2025-04-01 DIAGNOSIS — I35.0 AORTIC STENOSIS, MODERATE: ICD-10-CM

## 2025-04-01 DIAGNOSIS — I50.32 CHRONIC HEART FAILURE WITH PRESERVED EJECTION FRACTION: ICD-10-CM

## 2025-04-01 DIAGNOSIS — I65.23 BILATERAL CAROTID ARTERY STENOSIS: ICD-10-CM

## 2025-04-01 DIAGNOSIS — I10 ESSENTIAL HYPERTENSION: ICD-10-CM

## 2025-04-01 DIAGNOSIS — I47.29 NSVT (NONSUSTAINED VENTRICULAR TACHYCARDIA): ICD-10-CM

## 2025-04-01 DIAGNOSIS — E78.5 HYPERLIPIDEMIA LDL GOAL <70: ICD-10-CM

## 2025-04-01 DIAGNOSIS — I25.10 CORONARY ARTERY CALCIFICATION SEEN ON CT SCAN: Primary | ICD-10-CM

## 2025-04-01 PROBLEM — Z99.2 ESRD (END STAGE RENAL DISEASE) ON DIALYSIS: Status: RESOLVED | Noted: 2024-08-19 | Resolved: 2025-04-01

## 2025-04-01 PROBLEM — N18.6 ESRD (END STAGE RENAL DISEASE) ON DIALYSIS: Status: RESOLVED | Noted: 2024-08-19 | Resolved: 2025-04-01

## 2025-04-01 PROBLEM — J96.22 ACUTE ON CHRONIC RESPIRATORY FAILURE WITH HYPOXIA AND HYPERCAPNIA: Status: RESOLVED | Noted: 2024-05-30 | Resolved: 2025-04-01

## 2025-04-01 PROBLEM — J96.21 ACUTE ON CHRONIC RESPIRATORY FAILURE WITH HYPOXIA AND HYPERCAPNIA: Status: RESOLVED | Noted: 2024-05-30 | Resolved: 2025-04-01

## 2025-04-01 PROBLEM — R09.02 HYPOXIA: Status: RESOLVED | Noted: 2024-08-21 | Resolved: 2025-04-01

## 2025-04-01 PROBLEM — R79.89 ELEVATED TROPONIN LEVEL NOT DUE MYOCARDIAL INFARCTION: Status: RESOLVED | Noted: 2024-05-31 | Resolved: 2025-04-01

## 2025-04-01 RX ORDER — AMLODIPINE BESYLATE 2.5 MG/1
2.5 TABLET ORAL AS NEEDED
COMMUNITY
Start: 2025-03-25

## 2025-04-01 NOTE — PROGRESS NOTES
Chief Complaint  chronic heart failure and Hypertension    Subjective            History of Present Illness  Charlette Rai is an 89-year-old female patient who presents to the office today for follow-up.  She has coronary artery calcification seen on CT scan, nonsustained ventricular tachycardia, chronic HFpEF, moderate aortic stenosis, bilateral carotid artery stenosis, hypertension, and hyperlipidemia.  She is compliant with medication.  She is taking aspirin 81 mg daily and Plavix 75 mg daily for atherosclerosis.  She is taking rosuvastatin 20 mg nightly for cholesterol control.  She is taking isosorbide 30 mg daily for antianginal.  She takes 50 mg losartan twice daily and 25 mg on Thursdays for blood pressure control along with carvedilol 25 mg twice daily and amlodipine 2.5 mg daily as needed for systolic blood pressure greater than 160.  She reports compliance with all medications.  She denies any new or worsening cardiac symptoms at this time.      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 DENIES CP BUT DOES HAVE SOA CHRONIC ISSUE COPD/LUNG CANCER    COPD (chronic obstructive pulmonary disease)     FOLLOWED BY DR MARIAJOSE BAILEY    Coronary artery disease     DENIES CP BUT DOES GET SOA MOST OF THE TIME WITH EXERTION BUT OCC AT REST CHRONIC ISSUE COPD/LUNG CANCER    Deep vein thrombosis     Diabetes mellitus     DOES NOT CHECK BS DAILY     Disease of thyroid gland     HYPOTHYROIDISM    Essential hypertension     Gastric ulcer     GERD (gastroesophageal reflux disease)     Heart murmur     History of transfusion     NO ISSUES POST TRANSFUSION WAS MANY YEARS AGO    Hyperlipemia     Leukocytopenia     FOLLOWED BY DR MIR BAILEY    Limb swelling     Lumbago     Lumbar spinal stenosis     Lung cancer     Migraine headache     Multiple joint pain     On home oxygen therapy     4L/NC PRN    Osteopenia     PNA (pneumonia) 05/04/2024    Pseudomonas pneumonia 04/29/2024    Reflux esophagitis     Shortness of breath     Thyroid nodule     HAS ULTRASOUND YEARLY BEING MONITORED    Vascular disease     Vitamin D deficiency          ALLERGY  No Known Allergies       SURGICALHX  Past Surgical History:   Procedure Laterality Date    ABDOMINAL SURGERY      ANGIOPLASTY RENAL ARTERY Left 06/14/2024    stent placement    APPENDECTOMY      ARTERIOGRAM N/A 6/14/2024    Procedure: Arteriogram, right radial access, aortogram and renal arteriogram with possible intervention.;  Surgeon: Dio Miguel MD;  Location: [facility] CATH INVASIVE LOCATION;  Service: Peripheral Vascular;  Laterality: N/A;    ARTERIOVENOUS FISTULA/SHUNT SURGERY Left 05/10/2022    Procedure: Left basilic vein transposition;  Surgeon: Wan Barksdale MD;  Location: [facility] MAIN OR;  Service: Vascular;  Laterality: Left;    ARTERIOVENOUS FISTULA/SHUNT SURGERY Left 03/23/2023    Procedure: Ligation of left arm arteriovenous fistula;  Surgeon: Wan Barksdale MD;  Location: [facility] MAIN OR;  Service: Vascular;  Laterality: Left;    ARTERIOVENOUS FISTULA/SHUNT SURGERY Left 11/30/2023    Procedure: Creation of left arm arteriovenous graft;  Surgeon: Wan Barksdale MD;  Location: [facility] MAIN OR;  Service: Vascular;  Laterality: Left;    BRONCHOSCOPY N/A 04/24/2024    Procedure: BRONCHOSCOPY WITH BAL AND WASHINGS;  Surgeon: Khalif Yanez MD;  Location: [facility] ENDOSCOPY;  Service: Pulmonary;  Laterality: N/A;  MUCUS  PLUGGING    CARDIAC CATHETERIZATION      CARDIAC SURGERY      CARDIAC SURGERY      fluid drained from heart    CATARACT EXTRACTION, BILATERAL      COLONOSCOPY      ENDOSCOPY  2016    FEMORAL ARTERY STENT Bilateral     HYSTERECTOMY      LEG THROMBECTOMY/EMBOLECTOMY Left 2025    Procedure: LEFT LOWER EXTREMITY ANGIOGRAM;  Surgeon: Jaden Castañeda MD;  Location: [facility] HYBRID OR;  Service: Vascular;  Laterality: Left;    LUNG BIOPSY Left     lobectomy upper lung caner    LUNG VOLUME REDUCTION      OTHER SURGICAL HISTORY      artifical joints/limbs    REPLACEMENT TOTAL KNEE Left     SHUNT O GRAM N/A 2025    Procedure: dialysis shuntogram;  Surgeon: Court Valente MD;  Location: [facility] CATH INVASIVE LOCATION;  Service: Peripheral Vascular;  Laterality: N/A;    UPPER GASTROINTESTINAL ENDOSCOPY            SOC  Social History     Socioeconomic History    Marital status:    Tobacco Use    Smoking status: Former     Current packs/day: 0.00     Average packs/day: 1 pack/day for 52.5 years (52.5 ttl pk-yrs)     Types: Cigarettes     Start date:      Quit date: 2004     Years since quittin.7     Passive exposure: Past    Smokeless tobacco: Never   Vaping Use    Vaping status: Never Used   Substance and Sexual Activity    Alcohol use: Not Currently     Comment: beer many years ago    Drug use: Never    Sexual activity: Defer         FAMHX  Family History   Problem Relation Age of Onset    Arthritis Mother     Arthritis Father     Cancer Brother     Diabetes Brother     Other Brother         blood disease     Prostate cancer Brother     Cancer Brother     Prostate cancer Brother     Cancer Brother     Cancer Brother     Malig Hyperthermia Neg Hx     Colon cancer Neg Hx           MEDSIGONLY  Current Outpatient Medications on File Prior to Visit   Medication Sig    acetaminophen (TYLENOL) 325 MG tablet Take 2 tablets by mouth Every 6 (Six) Hours As Needed for Mild Pain.     albuterol sulfate  (90 Base) MCG/ACT inhaler Inhale 2 puffs Every 4 (Four) Hours As Needed for Wheezing.    amLODIPine (NORVASC) 2.5 MG tablet Take 1 tablet by mouth As Needed. Take 1 tablet as needed for a systolic BP greater than 150    aspirin 81 MG EC tablet Take 1 tablet by mouth Daily.    budesonide (Pulmicort) 0.5 MG/2ML nebulizer solution Take 2 mL by nebulization 2 (Two) Times a Day.    carvedilol (COREG) 25 MG tablet Take 1 tablet by mouth 2 (Two) Times a Day With Meals.    clopidogrel (Plavix) 75 MG tablet Take 1 tablet by mouth Daily.    dexlansoprazole (DEXILANT) 60 MG capsule Take 1 capsule by mouth Daily.    ferrous gluconate (FERGON) 324 MG tablet Take 1 tablet by mouth 3 (Three) Times a Week.    fluticasone (FLONASE) 50 MCG/ACT nasal spray Administer 1 spray into the nostril(s) as directed by provider Daily.    furosemide (LASIX) 20 MG tablet Take 1 tablet by mouth Daily.    ipratropium-albuterol (DUO-NEB) 0.5-2.5 mg/3 ml nebulizer Take 3 mL by nebulization Every 6 (Six) Hours As Needed for Wheezing.    isosorbide mononitrate (IMDUR) 30 MG 24 hr tablet Take 1 tablet by mouth Every Night.    levocetirizine (XYZAL) 5 MG tablet TAKE 1 TABLET DAILY    levothyroxine (SYNTHROID, LEVOTHROID) 50 MCG tablet Take 1 tablet by mouth Daily.    lidocaine-prilocaine (EMLA) 2.5-2.5 % cream Apply 1 Application topically to the appropriate area as directed Take As Directed. Apply to dialysis access site 30-45 minutes prior to dialysis    losartan (Cozaar) 25 MG tablet Take 1 tablet by mouth Every Night. Half tab for one week then 1 tab if SBP is greater than 140    losartan (Cozaar) 50 MG tablet Take 1 tablet by mouth 2 (Two) Times a Day.    Psyllium ([facility] DAILY FIBER PO) Take 2 tablets by mouth Daily.    rosuvastatin (CRESTOR) 20 MG tablet Take 1 tablet by mouth Every Night.    sevelamer (RENVELA) 800 MG tablet Take 1 tablet by mouth 3 (Three) Times a Day With Meals.    tobramycin PF (JUAN) 300 MG/5ML  "nebulizer solution Take 5 mL by nebulization 2 (Two) Times a Day. 30 days on 30 days off    vitamin D (ERGOCALCIFEROL) 1.25 MG (86499 UT) capsule capsule Take 1 capsule by mouth Every 14 (Fourteen) Days. (Patient taking differently: Take 1 capsule by mouth Every 14 (Fourteen) Days. The 15th and 30th of the month)    [DISCONTINUED] ferrous sulfate 325 (65 FE) MG tablet Take 1 tablet by mouth Daily With Breakfast.     No current facility-administered medications on file prior to visit.         Objective   /70   Pulse 75   Ht 165.1 cm (65\")   Wt 64 kg (141 lb 3.2 oz)   BMI 23.50 kg/m²       Physical Exam  Constitutional:       Appearance: She is normal weight.   HENT:      Head: Normocephalic.   Neck:      Vascular: No carotid bruit.   Cardiovascular:      Rate and Rhythm: Normal rate and regular rhythm.      Pulses: Normal pulses.      Heart sounds: Murmur heard.   Pulmonary:      Effort: Pulmonary effort is normal.      Breath sounds: Normal breath sounds.   Musculoskeletal:      Cervical back: Neck supple.      Right lower leg: No edema.      Left lower leg: No edema.   Skin:     General: Skin is dry.   Neurological:      Mental Status: She is alert. Mental status is at baseline.   Psychiatric:         Behavior: Behavior normal.         Result Review :   The following data was reviewed by: DAT Gutiérrez on 04/01/2025:  proBNP   Date Value Ref Range Status   02/18/2025 22,279.0 (H) 0.0 - 1,800.0 pg/mL Final     CMP          2/18/2025    10:11 3/18/2025    11:09   CMP   Glucose 88  100    BUN 22  25    Creatinine 3.99  3.72    EGFR 10.4  11.2    Sodium 143  139    Potassium 4.5  5.2    Chloride 101  99    Calcium 8.8  8.6    Total Protein 6.7  6.3     5.9    Albumin 3.2  3.2     3.1    Globulin 3.5  3.1     2.8    Total Bilirubin 0.3  0.3    Alkaline Phosphatase 305  322    AST (SGOT) 23  22    ALT (SGPT) 13  11    Albumin/Globulin Ratio 0.9  1.0     1.2    BUN/Creatinine Ratio 5.5  6.7    Anion Gap " "8.2  7.9      CBC w/diff          3/20/2025    10:40 3/26/2025    00:00   CBC w/Diff   WBC 5.21     RBC 4.19     Hemoglobin 12.4  12.2        Hematocrit 44.0  36.6   .0     MCH 29.6     MCHC 28.2     RDW 16.9     Platelets 113     Neutrophil Rel % 72.3     Immature Granulocyte Rel % 0.2     Lymphocyte Rel % 10.0     Monocyte Rel % 14.8     Eosinophil Rel % 2.1     Basophil Rel % 0.6       Lab Results   Component Value Date    TSH 1.800 10/31/2024      Lab Results   Component Value Date    FREET4 1.05 10/31/2024      D-Dimer, Quantitative   Date Value Ref Range Status   03/13/2024 4.30 (H) 0.00 - 0.87 MCGFEU/mL Final     Magnesium   Date Value Ref Range Status   03/20/2025 2.4 1.6 - 2.4 mg/dL Final      No results found for: \"DIGOXIN\"   Lab Results   Component Value Date    TROPONINT 72 (C) 09/30/2024 8/6/2024    09:26 2/18/2025    10:11   Lipid Panel   Total Cholesterol 94  110    Triglycerides 69  61    HDL Cholesterol 54  65    VLDL Cholesterol 15  14    LDL Cholesterol  25  31    LDL/HDL Ratio 0.49  0.50        Results for orders placed during the hospital encounter of 08/21/24    Adult Transthoracic Echo Limited W/ Cont if Necessary Per Protocol    Interpretation Summary    Left ventricular systolic function is low normal. Left ventricular ejection fraction appears to be 51 - 55%.    Left ventricular wall thickness is consistent with mild concentric hypertrophy.    The left atrial cavity is mild to moderately dilated.    Aortic valve is moderately calcified.  Mild to moderate aortic valve stenosis is present.  The peak and mean gradient across aortic valve are 21/15 mmHg with a calculated valve area of 1.64 cm².    Mitral valve apparatus is calcified with restricted opening.  Possible mitral stenosis, unable to assess severity.  Adequate Doppler studies not obtained across the mitral valve.           Assessment and Plan    Diagnoses and all orders for this visit:    1. Coronary artery " calcification seen on CT scan (Primary)  She denies any angina, continue aspirin, Plavix, and isosorbide.    2. NSVT (nonsustained ventricular tachycardia)  Symptomatically stable at this time, heart rate is well-controlled, continue current dose of carvedilol.    3. Chronic heart failure with preserved ejection fraction  She is euvolemic on exam today and is not requiring any diuretic therapy at this time.  Encourage fluid and sodium restriction, compression socks, and daily weight.    4. Aortic stenosis, moderate  Asymptomatic, continue to monitor with repeat echocardiogram.  -     Adult Transthoracic Echo Complete W/ Cont if Necessary Per Protocol; Future    5. Bilateral carotid artery stenosis  Symptomatically stable at this time, continue aspirin and Plavix.    6. Essential hypertension  Currently controlled and without adverse effect from medication, continue current medical therapy.  Low-sodium diet discussed.    7. Hyperlipidemia LDL goal <70  Last lipid panel was 2/18/2025 with LDL 31 which is within goal range, continue current dose of rosuvastatin.      Follow Up   Return in about 6 months (around 10/1/2025) for Follow up with Dr Sanchez.    Patient was given instructions and counseling regarding her condition or for health maintenance advice. Please see specific information pulled into the AVS if appropriate.     Charlette Rai  reports that she quit smoking about 20 years ago. Her smoking use included cigarettes. She started smoking about 73 years ago. She has a 52.5 pack-year smoking history. She has been exposed to tobacco smoke. She has never used smokeless tobacco.            Jazzy Adidson, DAT  04/01/25  10:07 EDT    Dictated Utilizing Dragon Dictation

## 2025-04-14 DIAGNOSIS — I50.32 CHRONIC HEART FAILURE WITH PRESERVED EJECTION FRACTION: Primary | ICD-10-CM

## 2025-04-22 ENCOUNTER — OFFICE VISIT (OUTPATIENT)
Dept: PULMONOLOGY | Facility: CLINIC | Age: 89
End: 2025-04-22
Payer: MEDICARE

## 2025-04-22 ENCOUNTER — HOSPITAL ENCOUNTER (OUTPATIENT)
Facility: HOSPITAL | Age: 89
Discharge: HOME | End: 2025-04-22
Payer: MEDICARE

## 2025-04-22 VITALS
WEIGHT: 143 LBS | BODY MASS INDEX: 23.82 KG/M2 | HEART RATE: 70 BPM | HEIGHT: 65 IN | OXYGEN SATURATION: 96 % | SYSTOLIC BLOOD PRESSURE: 155 MMHG | DIASTOLIC BLOOD PRESSURE: 67 MMHG | TEMPERATURE: 97.5 F | RESPIRATION RATE: 16 BRPM

## 2025-04-22 VITALS
SYSTOLIC BLOOD PRESSURE: 161 MMHG | OXYGEN SATURATION: 99 % | BODY MASS INDEX: 23.96 KG/M2 | DIASTOLIC BLOOD PRESSURE: 78 MMHG | HEART RATE: 70 BPM | WEIGHT: 143.8 LBS | HEIGHT: 65 IN

## 2025-04-22 DIAGNOSIS — I50.32 CHRONIC HEART FAILURE WITH PRESERVED EJECTION FRACTION: Primary | ICD-10-CM

## 2025-04-22 DIAGNOSIS — J45.40 MODERATE PERSISTENT ASTHMA, UNSPECIFIED WHETHER COMPLICATED: ICD-10-CM

## 2025-04-22 DIAGNOSIS — I35.0 AORTIC STENOSIS, MODERATE: ICD-10-CM

## 2025-04-22 DIAGNOSIS — Z87.891 EX-SMOKER: Primary | ICD-10-CM

## 2025-04-22 DIAGNOSIS — Z99.81 OXYGEN DEPENDENT: ICD-10-CM

## 2025-04-22 DIAGNOSIS — J44.0 COPD WITH LOWER RESPIRATORY INFECTION: ICD-10-CM

## 2025-04-22 DIAGNOSIS — J44.9 CHRONIC OBSTRUCTIVE PULMONARY DISEASE, UNSPECIFIED COPD TYPE: Chronic | ICD-10-CM

## 2025-04-22 DIAGNOSIS — I10 ESSENTIAL HYPERTENSION: ICD-10-CM

## 2025-04-22 PROCEDURE — G0463 HOSPITAL OUTPT CLINIC VISIT: HCPCS

## 2025-04-22 PROCEDURE — 93306 TTE W/DOPPLER COMPLETE: CPT

## 2025-04-22 RX ORDER — AMLODIPINE BESYLATE 5 MG/1
5 TABLET ORAL DAILY
Qty: 30 TABLET | Refills: 1 | Status: SHIPPED | OUTPATIENT
Start: 2025-04-22

## 2025-04-22 RX ORDER — ALBUTEROL SULFATE 90 UG/1
2 INHALANT RESPIRATORY (INHALATION) EVERY 4 HOURS PRN
Qty: 18 G | Refills: 11 | Status: SHIPPED | OUTPATIENT
Start: 2025-04-22

## 2025-04-22 NOTE — PROGRESS NOTES
Primary Care Provider  Brennan Mosqueda MD     Referring Provider  No ref. provider found     Chief Complaint  Follow-up (4 month), COPD (Squamous cell carcinoma right lung), and oxygen    Subjective          Charlette Rai presents to [facility] PULMONARY [facility]ARE MEDICINE  History of Present Illness  Charlette Rai is a 89 y.o. female patient   History of Present Illness  The patient is an 89-year-old female with a history of gastroesophageal reflux disease, end-stage renal disease, coronary artery disease, peripheral vascular disease, hypertension, heart failure with preserved ejection fraction, renal artery stenosis, hypothyroidism, and lung cancer in 2005 with right upper lobectomy, and COPD on 3 L oxygen via nasal cannula. 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 She completed radiation therapy with Dr. Mckeon and is doing okay. She experienced significant weakness during the radiation treatment.    She reports satisfactory respiratory function, utilizing oxygen at a rate of 3 to 3.5 liters per minute consistently. She has been experiencing a cough for the past few days. She has been sleeping with the window open due to feeling hot and needing air. Her physical activity at home is limited. She uses a nebulizer twice daily, administers Pulmicort twice daily, and uses DuoNeb as needed, sometimes up to three times daily. She also takes an antibiotic on a monthly rotation, currently in her off month.    She had an echocardiogram at 11:00 AM in the heart failure clinic, where one of her medications was increased.    She has leg swelling, which she attributes to multiple surgeries on that side. She has had blood clots in her leg seven times and currently has a blood clot. She undergoes dialysis on Monday, Wednesday, and  Friday.    MEDICATIONS  Current: Pulmicort, DuoNeb, albuterol inhaler    IMMUNIZATIONS  She received the influenza and pneumonia vaccines last year.    Review of Systems     General:  No Fatigue, No Fever No weight loss or loss of appetite  HEENT: No dysphagia, No Visual Changes, no rhinorrhea  Respiratory:  + cough,+Dyspnea, scant phlegm, No Pleuritic Pain, no wheezing, no hemoptysis.  Cardiovascular: Denies chest pain, denies palpitations,+HAIDER, No Chest Pressure  Gastrointestinal:  No Abdominal Pain, No Nausea, No Vomiting, No Diarrhea  Genitourinary:  No Dysuria, No Frequency, No Hesitancy  Musculoskeletal: No muscle pain or swelling  Endocrine:  No Heat Intolerance, No Cold Intolerance  Hematologic:  No Bleeding, No Bruising  Psychiatric:  No Anxiety, No Depression  Neurologic:  No Confusion, no Dysarthria, No Headaches  Skin:  No Rash, No Open Wounds    Family History   Problem Relation Age of Onset    Arthritis Mother     Arthritis Father     Cancer Brother     Diabetes Brother     Other Brother         blood disease     Prostate cancer Brother     Cancer Brother     Prostate cancer Brother     Cancer Brother     Cancer Brother     Malig Hyperthermia Neg Hx     Colon cancer Neg Hx         Social History     Socioeconomic History    Marital status:    Tobacco Use    Smoking status: Former     Current packs/day: 0.00     Average packs/day: 1 pack/day for 52.5 years (52.5 ttl pk-yrs)     Types: Cigarettes     Start date:      Quit date: 2004     Years since quittin.8     Passive exposure: Past    Smokeless tobacco: Never   Vaping Use    Vaping status: Never Used   Substance and Sexual Activity    Alcohol use: Not Currently     Comment: beer many years ago    Drug use: Never    Sexual activity: Defer        Past Medical History:   Diagnosis Date    Allergic rhinitis     Anaphylactic shock, unspecified, initial encounter 2023    Anemia     Arthritis     Asthma     USE INHALERS AND  NEBULIZERS    Back pain     Bladder disorder     Cancer     LEFT LUNG CANCER 2005 SURGERY AND CHEMO DONE  AND CURRENTLY 4/ 14/22  RIGHT LUNG CANCER HAS ONLY RECEIVED RADIATION THUS FAR    Chronic kidney disease     stage 3    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 DENIES CP BUT DOES HAVE SOA CHRONIC ISSUE COPD/LUNG CANCER    COPD (chronic obstructive pulmonary disease)     FOLLOWED BY DR MARIAJOSE BAILEY    Coronary artery disease     DENIES CP BUT DOES GET SOA MOST OF THE TIME WITH EXERTION BUT OCC AT REST CHRONIC ISSUE COPD/LUNG CANCER    Deep vein thrombosis     Diabetes mellitus     DOES NOT CHECK BS DAILY    Disease of thyroid gland     HYPOTHYROIDISM    Essential hypertension     Gastric ulcer     GERD (gastroesophageal reflux disease)     Heart murmur     History of transfusion     NO ISSUES POST TRANSFUSION WAS MANY YEARS AGO    Hyperlipemia     Leukocytopenia     FOLLOWED BY DR MIR BAILEY    Limb swelling     Lumbago     Lumbar spinal stenosis     Lung cancer     Migraine headache     Multiple joint pain     On home oxygen therapy     4L/NC PRN    Osteopenia     PNA (pneumonia) 05/04/2024    Pseudomonas pneumonia 04/29/2024    Reflux esophagitis     Shortness of breath     Thyroid nodule     HAS ULTRASOUND YEARLY BEING MONITORED    Vascular disease     Vitamin D deficiency         Immunization History   Administered Date(s) Administered    ABRYSVO (RSV, 60+ or pregnant women 32-36 wks) 09/19/2024    COVID-19 (PFIZER) 12YRS+ (COMIRNATY) 10/19/2023, 10/10/2024    COVID-19 (PFIZER) BIVALENT 12+YRS 10/13/2022    COVID-19 (PFIZER) Purple Cap Monovalent 03/07/2021, 04/04/2021, 10/31/2021    FLUAD TRI 65YR+ 10/10/2024    Fluad Quad 65+ 10/19/2023    Fluzone (or Fluarix & Flulaval for VFC) >6mos 09/21/2016    Fluzone High-Dose 65+YRS 10/09/2018, 10/15/2020,  10/12/2021, 09/19/2022    Hepatitis B 2 Dose Vaccine Heplisav-B 08/28/2024, 09/23/2024, 10/23/2024, 12/23/2024, 03/26/2025    Influenza Seasonal Injectable 10/01/2017, 10/01/2018    Influenza, Unspecified 10/13/2021    Pneumococcal Conjugate 13-Valent (PCV13) 10/21/2015    Pneumococcal Conjugate 20-Valent (PCV20) 08/30/2024    Pneumococcal Polysaccharide (PPSV23) 10/30/2003, 11/08/2016         No Known Allergies       Current Outpatient Medications:     acetaminophen (TYLENOL) 325 MG tablet, Take 2 tablets by mouth Every 6 (Six) Hours As Needed for Mild Pain., Disp: , Rfl:     albuterol sulfate  (90 Base) MCG/ACT inhaler, Inhale 2 puffs Every 4 (Four) Hours As Needed for Wheezing., Disp: 18 g, Rfl: 11    amLODIPine (NORVASC) 5 MG tablet, Take 1 tablet by mouth Daily. Increase dose to 5mg and take every day, Disp: 30 tablet, Rfl: 1    aspirin 81 MG EC tablet, Take 1 tablet by mouth Daily., Disp: , Rfl:     budesonide (Pulmicort) 0.5 MG/2ML nebulizer solution, Take 2 mL by nebulization 2 (Two) Times a Day., Disp: 60 each, Rfl: 5    carvedilol (COREG) 25 MG tablet, Take 1 tablet by mouth 2 (Two) Times a Day With Meals., Disp: 180 tablet, Rfl: 3    clopidogrel (Plavix) 75 MG tablet, Take 1 tablet by mouth Daily., Disp: 90 tablet, Rfl: 3    dexlansoprazole (DEXILANT) 60 MG capsule, Take 1 capsule by mouth Daily., Disp: 90 capsule, Rfl: 3    ferrous gluconate (FERGON) 324 MG tablet, Take 1 tablet by mouth 3 (Three) Times a Week., Disp: 60 tablet, Rfl: 1    fluticasone (FLONASE) 50 MCG/ACT nasal spray, Administer 1 spray into the nostril(s) as directed by provider Daily., Disp: , Rfl:     furosemide (LASIX) 20 MG tablet, Take 1 tablet by mouth Daily. (Patient taking differently: Take 1 tablet by mouth Daily. Takes on 40 mg on Thursdays), Disp: 90 tablet, Rfl: 3    ipratropium-albuterol (DUO-NEB) 0.5-2.5 mg/3 ml nebulizer, Take 3 mL by nebulization Every 6 (Six) Hours As Needed for Wheezing., Disp: 360 mL, Rfl: 2     "isosorbide mononitrate (IMDUR) 30 MG 24 hr tablet, Take 1 tablet by mouth Every Night., Disp: 90 tablet, Rfl: 3    levocetirizine (XYZAL) 5 MG tablet, TAKE 1 TABLET DAILY, Disp: 90 tablet, Rfl: 1    levothyroxine (SYNTHROID, LEVOTHROID) 50 MCG tablet, Take 1 tablet by mouth Daily., Disp: , Rfl:     lidocaine-prilocaine (EMLA) 2.5-2.5 % cream, Apply 1 Application topically to the appropriate area as directed Take As Directed. Apply to dialysis access site 30-45 minutes prior to dialysis, Disp: , Rfl:     losartan (Cozaar) 25 MG tablet, Take 1 tablet by mouth Every Night. Half tab for one week then 1 tab if SBP is greater than 140, Disp: 90 tablet, Rfl: 0    losartan (Cozaar) 50 MG tablet, Take 1 tablet by mouth 2 (Two) Times a Day., Disp: 60 tablet, Rfl: 5    Methoxy PEG-Epoetin Beta (MIRCERA IJ), 200 mcg Every 14 (Fourteen) Days., Disp: , Rfl:     Psyllium ([employer] DAILY FIBER PO), Take 2 tablets by mouth Daily., Disp: , Rfl:     rosuvastatin (CRESTOR) 20 MG tablet, Take 1 tablet by mouth Every Night., Disp: 90 tablet, Rfl: 3    sevelamer (RENVELA) 800 MG tablet, Take 1 tablet by mouth 3 (Three) Times a Day With Meals., Disp: 270 tablet, Rfl: 3    tobramycin PF (JUAN) 300 MG/5ML nebulizer solution, Take 5 mL by nebulization 2 (Two) Times a Day. 30 days on 30 days off, Disp: 300 mL, Rfl: 6    vitamin D (ERGOCALCIFEROL) 1.25 MG (06204 UT) capsule capsule, Take 1 capsule by mouth Every 14 (Fourteen) Days. (Patient taking differently: Take 1 capsule by mouth Every 14 (Fourteen) Days. The 15th and 30th of the month), Disp: 6 capsule, Rfl: 3     Objective   Physical Exam  Physical Exam      Vital Signs:   /67 (BP Location: Right arm, Patient Position: Sitting, Cuff Size: Adult)   Pulse 70   Temp 97.5 °F (36.4 °C) (Tympanic)   Resp 16   Ht 165.1 cm (65\")   Wt 64.9 kg (143 lb)   SpO2 96% Comment: 3 liters nc  BMI 23.80 kg/m²       Vital Signs Reviewed  WDWN, Alert, NAD.   HEENT:  PERRL, EOMI.  OP, nares clear, no " sinus tenderness  Mallampatti classification : 1  Neck:  Supple, no JVD, no thyromegaly  Lymph: no axillary, cervical, supraclavicular lymphadenopathy noted bilaterally  Chest:  good aeration, bilateral diminished breath sounds, no wheezing, crackles or rhonchi, resonant to percussion b/l  CV: RRR, no MGR, pulses 2+, equal.  Abd:  Soft, NT, ND, + BS, no HSM  EXT:  no clubbing, no cyanosis, No BLE edema  Neuro:  A&Ox3, CN grossly intact, no focal deficits.  Skin: No rashes or lesions noted     Result Review :   The following data was reviewed by: Khalif Yanez MD on 04/22/2025:  Common labs          4/2/2025    00:00 4/9/2025    00:00 4/16/2025    00:00   Common Labs   Hemoglobin 11.7        35.1     10.8        32.4        Hemoglobin A1C   4.9          Details          This result is from an external source.             CMP          2/1/2025    05:30 2/18/2025    10:11 3/18/2025    11:09   CMP   Glucose 87  88  100    BUN 22  22  25    Creatinine 3.31  3.99  3.72    EGFR 13.0  10.4  11.2    Sodium 135  143  139    Potassium 4.1  4.5  5.2    Chloride 103  101  99    Calcium 8.0  8.8  8.6    Total Protein  6.7  6.3     5.9    Albumin 2.7  3.2  3.2     3.1    Globulin  3.5  3.1     2.8    Total Bilirubin  0.3  0.3    Alkaline Phosphatase  305  322    AST (SGOT)  23  22    ALT (SGPT)  13  11    Albumin/Globulin Ratio  0.9  1.0     1.2    BUN/Creatinine Ratio 6.6  5.5  6.7    Anion Gap 8.7  8.2  7.9      CBC          3/26/2025    00:00 4/2/2025    00:00 4/9/2025    00:00   CBC   Hemoglobin 12.2        36.6     11.7        35.1     10.8        32.4          Details          This result is from an external source.             CBC w/diff          3/26/2025    00:00 4/2/2025    00:00 4/9/2025    00:00   CBC w/Diff   Hemoglobin 12.2        36.6     11.7        35.1     10.8        32.4          Details          This result is from an external source.             Data reviewed : Radiologic studies Imaging reviewed. S/p  stereotactic radiation.     Results               Assessment and Plan    Diagnoses and all orders for this visit:    1. Ex-smoker (Primary)    2. Chronic obstructive pulmonary disease, unspecified COPD type  Comments:  Continue Pulmicort    3. COPD with lower respiratory infection    4. Oxygen dependent    5. Moderate persistent asthma, unspecified whether complicated      Assessment & Plan  1. Chronic obstructive pulmonary disease.  She is advised to maintain her current medication regimen, including JUAN neb and other nebulizers. A prescription refill for the albuterol inhaler has been provided. She is encouraged to remain as active as possible while ensuring safety. If any issues arise, she should contact the office.    2. 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  She completed radiation therapy with Dr. Mckeon and is doing okay. A repeat CT scan will be conducted under the supervision of Dr. Mckeon. If the CT scan results are stable, Dr. Mckeon will continue to order images based on those findings.    3. Heart failure with preserved ejection fraction.  She had an echocardiogram at the heart failure clinic, where one of her medications was increased.    4. End-stage renal disease.  She continues to undergo dialysis on Monday, Wednesday, and Friday.    Follow-up  The patient will follow up in 6 months.    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  Right upper lobectomy was performed in 2005.    Follow Up   Return in about 6 months (around 10/22/2025).  Patient was given instructions and counseling regarding her condition or for health maintenance advice. Please see specific information pulled into the AVS if appropriate.     Patient or patient representative verbalized consent for the use of Ambient Listening during the visit with  Khalif Yanez MD for chart documentation. 4/22/2025  14:26 EDT    Electronically signed by  Khalif Yanez MD, 4/22/2025, 14:28 EDT.

## 2025-04-22 NOTE — PROGRESS NOTES
[facility]    Brennan Mosqueda MD  [address] [facility]  [address]  [place] [address]    Thank you for asking me to see Charlette Rai.     History of Present Illness    1. HFpEF  2. HTN    Subjective     Charlette Rai is a 89 y.o. female who presents today for follow up for HFpEF, LVEF 51-55%. 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    Patient endorses shortness of breath at visit today but states that it is unchanged and has been present since her recent hospital admission in January. Currently on her baseline of 3 L oxygen via nasal cannula. Bilateral lower extremity edema noted, left greater than right, but this is unchanged from baseline. Patient states that lower extremity edema improves after dialysis. Weight is up 3 pounds from previous office visit but patient states her weight fluctuates from day-to-day in between her dialysis appointments.    Repeat echocardiogram ordered today per cardiology to evaluate aortic stenosis, pending results.    Review of Systems   Constitutional: Positive for weight gain.   Cardiovascular:  Positive for dyspnea on exertion and leg swelling. Negative for chest pain.   Respiratory:  Positive for shortness of breath.    Neurological:  Negative for dizziness and light-headedness.        Patient Active Problem List   Diagnosis    Malignant neoplasm of upper lobe of right lung    Rheumatoid arthritis    Asthma    Chronic heart failure with preserved ejection fraction    Essential hypertension    GERD (gastroesophageal reflux disease)    Hyperlipidemia LDL goal <70    Lumbar spinal stenosis     Migraine    Monoclonal paraproteinemia    Osteopenia    Oxygen dependent    Peripheral neuropathy    Stage 4 chronic kidney disease    Vitamin D deficiency    Carotid artery stenosis    Chronic right-sided thoracic back pain    Peripheral vascular disease of lower extremity    Ex-smoker    De Quervain's tenosynovitis    Arthritis of carpometacarpal (CMC) joint of right thumb    Steal syndrome of dialysis vascular access    Anemia of chronic renal failure, stage 4 (severe)    Aortic stenosis, moderate    Hematoma    End stage renal disease on dialysis    Coronary artery calcification seen on CT scan    Frailty syndrome in geriatric patient    COPD with lower respiratory infection    Coagulation defect, unspecified    Hyperphosphatemia    Hypothyroidism (acquired)    Idiopathic osteoarthritis    Secondary multiple arthritis    Type 2 diabetes mellitus with diabetic peripheral angiopathy without gangrene    Renal artery stenosis    S/P renal artery angioplasty    Atherosclerosis of renal artery    Abnormal serum immunoelectrophoresis    NSVT (nonsustained ventricular tachycardia)    Hypertensive heart and chronic kidney disease with heart failure and with stage 5 chronic kidney disease, or end stage renal disease    Unspecified protein-calorie malnutrition    IFG (impaired fasting glucose)    Iron deficiency anemia    AV graft malfunction, initial encounter    AV graft malfunction    Weakness    Malignant neoplasm of lower lobe of left lung    Malignant neoplasm of lower lobe, right bronchus or lung     family history includes Arthritis in her father and mother; Cancer in her brother, brother, brother, and brother; Diabetes in her brother; Other in her brother; Prostate cancer in her brother and brother.   reports that she quit smoking about 20 years ago. Her smoking use included cigarettes. She started smoking about 73 years ago. She has a 52.5 pack-year smoking history. She has been exposed to tobacco smoke. She has  never used smokeless tobacco. She reports that she does not currently use alcohol. She reports that she does not use drugs.  No Known Allergies  Physical Activity: Inactive (8/23/2024)    Exercise Vital Sign     Days of Exercise per Week: 0 days     Minutes of Exercise per Session: 0 min          Current Outpatient Medications:     acetaminophen (TYLENOL) 325 MG tablet, Take 2 tablets by mouth Every 6 (Six) Hours As Needed for Mild Pain., Disp: , Rfl:     amLODIPine (NORVASC) 5 MG tablet, Take 1 tablet by mouth Daily. Increase dose to 5mg and take every day, Disp: 30 tablet, Rfl: 1    aspirin 81 MG EC tablet, Take 1 tablet by mouth Daily., Disp: , Rfl:     budesonide (Pulmicort) 0.5 MG/2ML nebulizer solution, Take 2 mL by nebulization 2 (Two) Times a Day., Disp: 60 each, Rfl: 5    carvedilol (COREG) 25 MG tablet, Take 1 tablet by mouth 2 (Two) Times a Day With Meals., Disp: 180 tablet, Rfl: 3    clopidogrel (Plavix) 75 MG tablet, Take 1 tablet by mouth Daily., Disp: 90 tablet, Rfl: 3    dexlansoprazole (DEXILANT) 60 MG capsule, Take 1 capsule by mouth Daily., Disp: 90 capsule, Rfl: 3    ferrous gluconate (FERGON) 324 MG tablet, Take 1 tablet by mouth 3 (Three) Times a Week., Disp: 60 tablet, Rfl: 1    fluticasone (FLONASE) 50 MCG/ACT nasal spray, Administer 1 spray into the nostril(s) as directed by provider Daily., Disp: , Rfl:     furosemide (LASIX) 20 MG tablet, Take 1 tablet by mouth Daily. (Patient taking differently: Take 1 tablet by mouth Daily. Takes on 40 mg on Thursdays), Disp: 90 tablet, Rfl: 3    ipratropium-albuterol (DUO-NEB) 0.5-2.5 mg/3 ml nebulizer, Take 3 mL by nebulization Every 6 (Six) Hours As Needed for Wheezing., Disp: 360 mL, Rfl: 2    isosorbide mononitrate (IMDUR) 30 MG 24 hr tablet, Take 1 tablet by mouth Every Night., Disp: 90 tablet, Rfl: 3    levocetirizine (XYZAL) 5 MG tablet, TAKE 1 TABLET DAILY, Disp: 90 tablet, Rfl: 1    levothyroxine (SYNTHROID, LEVOTHROID) 50 MCG tablet, Take 1  tablet by mouth Daily., Disp: , Rfl:     lidocaine-prilocaine (EMLA) 2.5-2.5 % cream, Apply 1 Application topically to the appropriate area as directed Take As Directed. Apply to dialysis access site 30-45 minutes prior to dialysis, Disp: , Rfl:     losartan (Cozaar) 50 MG tablet, Take 1 tablet by mouth 2 (Two) Times a Day., Disp: 60 tablet, Rfl: 5    Psyllium ([employer] DAILY FIBER PO), Take 2 tablets by mouth Daily., Disp: , Rfl:     rosuvastatin (CRESTOR) 20 MG tablet, Take 1 tablet by mouth Every Night., Disp: 90 tablet, Rfl: 3    sevelamer (RENVELA) 800 MG tablet, Take 1 tablet by mouth 3 (Three) Times a Day With Meals., Disp: 270 tablet, Rfl: 3    tobramycin PF (JUAN) 300 MG/5ML nebulizer solution, Take 5 mL by nebulization 2 (Two) Times a Day. 30 days on 30 days off, Disp: 300 mL, Rfl: 6    vitamin D (ERGOCALCIFEROL) 1.25 MG (77576 UT) capsule capsule, Take 1 capsule by mouth Every 14 (Fourteen) Days. (Patient taking differently: Take 1 capsule by mouth Every 14 (Fourteen) Days. The 15th and 30th of the month), Disp: 6 capsule, Rfl: 3    albuterol sulfate  (90 Base) MCG/ACT inhaler, Inhale 2 puffs Every 4 (Four) Hours As Needed for Wheezing., Disp: 18 g, Rfl: 11    losartan (Cozaar) 25 MG tablet, Take 1 tablet by mouth Every Night. Half tab for one week then 1 tab if SBP is greater than 140, Disp: 90 tablet, Rfl: 0    Methoxy PEG-Epoetin Beta (MIRCERA IJ), 200 mcg Every 14 (Fourteen) Days., Disp: , Rfl:     Objective   Vital Sign Review:   Vitals:    04/22/25 1309   BP: 161/78   Pulse: 70   SpO2: 99%     Body mass index is 23.93 kg/m².      04/22/25  1309   Weight: 65.2 kg (143 lb 12.8 oz)     Physical Exam:  Constitutional:       Appearance: Chronically ill-appearing.   Pulmonary:      Effort: Pulmonary effort is normal.      Breath sounds: Bilateral Wheezing present.   Cardiovascular:      Normal rate. Regular rhythm.   Edema:     Peripheral edema present.     Ankle: 2+ pitting edema of the left ankle and  1+ pitting edema of the right ankle.     Feet: 2+ pitting edema of the left foot and 1+ pitting edema of the right foot.       DATA REVIEWED:   EKG:      ---------------------------------------------------  ECHO:  Results for orders placed during the hospital encounter of 08/21/24    Adult Transthoracic Echo Limited W/ Cont if Necessary Per Protocol    Interpretation Summary    Left ventricular systolic function is low normal. Left ventricular ejection fraction appears to be 51 - 55%.    Left ventricular wall thickness is consistent with mild concentric hypertrophy.    The left atrial cavity is mild to moderately dilated.    Aortic valve is moderately calcified.  Mild to moderate aortic valve stenosis is present.  The peak and mean gradient across aortic valve are 21/15 mmHg with a calculated valve area of 1.64 cm².    Mitral valve apparatus is calcified with restricted opening.  Possible mitral stenosis, unable to assess severity.  Adequate Doppler studies not obtained across the mitral valve.    -----------------------------------------------------  RHC/LHC  Results for orders placed during the hospital encounter of 01/13/25    Cardiac Catheterization/Vascular Study    Conclusion  [facility]  Procedure Note    Procedure Name.    1) Tunneled dialysis catheter insertion  2) Radiologic supervision of catheter tip placement ([id number]    Reason of Procedure: 1. Malfunctioning left upper arm AV graft.  2. Malfunctioning tunneled dialysis catheter right IJ.    Findings: Following completion of the procedure the course of the catheter is evaluated with fluoroscopy and found to have a gentle curve in the neck with no kinks, the tip of the catheter is in the right atrium.  There is excellent blood return and easy infusability via both ports.    Implants: 19 cm tunneled dialysis catheter    ---------------------------------------------------------------------------  CXR/Imaging:   Imaging Results (Most Recent)       None           -----------------------------------------------------------------------------  CT:   No radiology results for the last 30 days.    --------------------------------------------------------------------------------------------------------------    Laboratory evaluations:  Lab Results   Component Value Date    GLUCOSE 100 (H) 03/18/2025    BUN 25 (H) 03/18/2025    CREATININE 3.72 (H) 03/18/2025    EGFRIFNONA 17 (L) 01/10/2022    BCR 6.7 (L) 03/18/2025    K 5.2 03/18/2025    CO2 32.1 (H) 03/18/2025    CALCIUM 8.6 03/18/2025    ALBUMIN 3.2 (L) 03/18/2025    ALBUMIN 3.1 03/18/2025    AST 22 03/18/2025    ALT 11 03/18/2025     Lab Results   Component Value Date    WBC 5.21 03/20/2025    HGB 10.8 (L) 04/09/2025    HGB 32.4 (L) 04/09/2025    HCT 44.0 03/20/2025    .0 (H) 03/20/2025     (L) 03/20/2025     Lab Results   Component Value Date    HGBA1C 4.9 04/16/2025     Lab Results   Component Value Date    HGBA1C 4.9 04/16/2025    HGBA1C 5.5 01/15/2025    HGBA1C 4.80 01/13/2025     Lab Results   Component Value Date    CREATININE 3.72 (H) 03/18/2025     Lab Results   Component Value Date    IRON 56 03/20/2025    TIBC 215 (L) 03/20/2025    FERRITIN 1,506.00 (H) 03/20/2025       Assessment & Plan      1. Chronic heart failure with preserved ejection fraction    2. Essential hypertension        HFpEF  Pt appears mildly volume overloaded on exam today with sight weight gain and BLE edema present. Patient states that edema and weight improve with hemodialysis, HD scheduled for tomorrow. Currently taking lasix 20mg daily, will continue as patient is oliguric and unlikely to respond to increased dosage. She endorses shortness of breath but this has been unchanged over the past 3 months. GDMT listed below.    HTN  BP remains elevated on home logs and is 161/78 at visit today. Losartan increased at previous office visit to 50mg twice daily. Pt is taking amlodipine 2.5mg PRN approximately 3 times a week, sometimes  twice daily for SBP greater than 160. Will increase dose to 5mg and order medication to be taken daily. Continue Coreg and Losartan at current doses. Pt instructed to call the clinic is SBP remains elevated above 160 with amlodipine increase.     NYHA stage C FC-III     Clinical status was assessed and has remained stable.  Treatment intent: curative     Today, Patient is mildly volume overloaded and with   adequate  perfusion. BP/MAP was reviewed and there is room for medication up-titration. LVEF:  51-55% .     GDMT changes recommended today: Increase antihypertensive agent.    BB:   continue Carvedilol  25mg bid  Monitor heart rate.  Please call the [facility] for HR<50, dizziness, lightheadedness, syncope    A/A/A:   continue  Losartan  50mg bid  Occasional monitoring of Chem-7 is recommended; call for the development of a new cough or S/Sx angioedema    SGLT2-I:  Therapy contraindicated given ESRD and GFR of 11    MRA:  Therapy contraindicated given ESRD and GFR of 11    -DIURETIC:   furosemide (LASIX) 20 mg every day    Directions for when to call the clinic reviewed with the patient to include weight gain of 2 to 3 pounds in 24 hours, weight gain of 5 to 10 pounds within 7 days; worsening shortness of breath; worsening lower extremity edema or abdominal distention.     -Fluid restriction:   -requested patient to follow fluid restriction per nephrology    -Sodium restriction:   -1,500 mg Na restriction was discussed    Iron:  + iron deficiency edema, currently taking oral iron supplementation 3 days/week    Labs/Diagnostics/Referrals:    Labs -Pro-BNP, CBC, no diff, and Chem-7 prior to next office visit   Imaging -2D TTE completed today, results pending   Referrals No referrals placed today   Currently Pending: Echocardiogram     Lifestyle Advice:   - call office if I gain more than 2 pounds in one day or 5 pounds in one week   - keep legs up while sitting   - use salt in moderation   - watch for swelling in feet,  ankles and legs every day   - weigh myself daily   -call for concerning s/sx   -- activity or exercise based on tolerance encouraged       CODE STATUS: FULL        Return in about 3 months (around 7/22/2025).    DAT Bo on 04/22/2025

## 2025-04-22 NOTE — PATIENT INSTRUCTIONS
Increase Amlodipine to 5mg and take every day. Please call the office if SBP remains elevated higher than 160 after taking the increased dose of Amlodipine.    Continue to take HR/BP/weight daily and bring log with you to the next visit. Call the office with SBP less than 110 or HR less than 60 or with any dizziness or light headedness.     Directions for when to call the clinic reviewed with the patient to include weight gain of 2 to 3 pounds in 24 hours, weight gain of 5 to 10 pounds within 7 days; worsening shortness of breath; worsening lower extremity edema or abdominal distention.

## 2025-04-23 ENCOUNTER — RESULTS FOLLOW-UP (OUTPATIENT)
Dept: CARDIOLOGY | Facility: CLINIC | Age: 89
End: 2025-04-23
Payer: MEDICARE

## 2025-04-23 LAB
AORTIC DIMENSIONLESS INDEX: 0.43 (DI)
ASCENDING AORTA: 3.5 CM
AV MEAN PRESS GRAD SYS DOP V1V2: 15.5 MMHG
AV VMAX SYS DOP: 250.4 CM/SEC
BH CV ECHO MEAS - AO MAX PG: 25.1 MMHG
BH CV ECHO MEAS - AO ROOT DIAM: 3.2 CM
BH CV ECHO MEAS - AO V2 VTI: 70.9 CM
BH CV ECHO MEAS - AVA(I,D): 1.39 CM2
BH CV ECHO MEAS - EDV(CUBED): 72.8 ML
BH CV ECHO MEAS - EDV(MOD-SP2): 116 ML
BH CV ECHO MEAS - EDV(MOD-SP4): 97.3 ML
BH CV ECHO MEAS - EF(MOD-SP2): 66.5 %
BH CV ECHO MEAS - EF(MOD-SP4): 55.8 %
BH CV ECHO MEAS - ESV(CUBED): 25.6 ML
BH CV ECHO MEAS - ESV(MOD-SP2): 38.9 ML
BH CV ECHO MEAS - ESV(MOD-SP4): 43 ML
BH CV ECHO MEAS - FS: 29.4 %
BH CV ECHO MEAS - IVS/LVPW: 0.84 CM
BH CV ECHO MEAS - IVSD: 1.03 CM
BH CV ECHO MEAS - LA DIMENSION: 4 CM
BH CV ECHO MEAS - LAT PEAK E' VEL: 6.2 CM/SEC
BH CV ECHO MEAS - LV DIASTOLIC VOL/BSA (35-75): 57.1 CM2
BH CV ECHO MEAS - LV MASS(C)D: 161.4 GRAMS
BH CV ECHO MEAS - LV MAX PG: 4.2 MMHG
BH CV ECHO MEAS - LV MEAN PG: 2.36 MMHG
BH CV ECHO MEAS - LV SYSTOLIC VOL/BSA (12-30): 25.2 CM2
BH CV ECHO MEAS - LV V1 MAX: 102.2 CM/SEC
BH CV ECHO MEAS - LV V1 VTI: 30.6 CM
BH CV ECHO MEAS - LVIDD: 4.2 CM
BH CV ECHO MEAS - LVIDS: 2.9 CM
BH CV ECHO MEAS - LVOT AREA: 3.2 CM2
BH CV ECHO MEAS - LVOT DIAM: 2.03 CM
BH CV ECHO MEAS - LVPWD: 1.23 CM
BH CV ECHO MEAS - MED PEAK E' VEL: 5.7 CM/SEC
BH CV ECHO MEAS - MV A MAX VEL: 167.1 CM/SEC
BH CV ECHO MEAS - MV DEC SLOPE: 634.5 CM/SEC2
BH CV ECHO MEAS - MV DEC TIME: 0.22 SEC
BH CV ECHO MEAS - MV E MAX VEL: 150 CM/SEC
BH CV ECHO MEAS - MV E/A: 0.9
BH CV ECHO MEAS - MV MAX PG: 13.1 MMHG
BH CV ECHO MEAS - MV MEAN PG: 7.2 MMHG
BH CV ECHO MEAS - MV P1/2T: 75.6 MSEC
BH CV ECHO MEAS - MV V2 VTI: 50.6 CM
BH CV ECHO MEAS - MVA(P1/2T): 2.9 CM2
BH CV ECHO MEAS - MVA(VTI): 1.95 CM2
BH CV ECHO MEAS - RAP SYSTOLE: 5 MMHG
BH CV ECHO MEAS - RVDD: 2.7 CM
BH CV ECHO MEAS - RVSP: 39.6 MMHG
BH CV ECHO MEAS - SV(LVOT): 98.6 ML
BH CV ECHO MEAS - SV(MOD-SP2): 77.1 ML
BH CV ECHO MEAS - SV(MOD-SP4): 54.3 ML
BH CV ECHO MEAS - SVI(LVOT): 57.8 ML/M2
BH CV ECHO MEAS - SVI(MOD-SP2): 45.2 ML/M2
BH CV ECHO MEAS - SVI(MOD-SP4): 31.8 ML/M2
BH CV ECHO MEAS - TAPSE (>1.6): 1.94 CM
BH CV ECHO MEAS - TR MAX PG: 34.6 MMHG
BH CV ECHO MEAS - TR MAX VEL: 294.1 CM/SEC
BH CV ECHO MEASUREMENTS AVERAGE E/E' RATIO: 25.21
IVRT: 55 MS
LV EF BIPLANE MOD: 60.5 %

## 2025-05-06 ENCOUNTER — HOSPITAL ENCOUNTER (OUTPATIENT)
Dept: CT IMAGING | Facility: HOSPITAL | Age: 89
Discharge: HOME | End: 2025-05-06
Admitting: RADIOLOGY
Payer: MEDICARE

## 2025-05-06 DIAGNOSIS — C34.31 MALIGNANT NEOPLASM OF LOWER LOBE, RIGHT BRONCHUS OR LUNG: ICD-10-CM

## 2025-05-06 PROCEDURE — 71250 CT THORAX DX C-: CPT

## 2025-05-13 ENCOUNTER — OFFICE VISIT (OUTPATIENT)
Dept: RADIATION ONCOLOGY | Facility: HOSPITAL | Age: 89
End: 2025-05-13
Payer: MEDICARE

## 2025-05-13 VITALS
WEIGHT: 142.86 LBS | BODY MASS INDEX: 23.77 KG/M2 | SYSTOLIC BLOOD PRESSURE: 144 MMHG | OXYGEN SATURATION: 100 % | DIASTOLIC BLOOD PRESSURE: 53 MMHG | HEART RATE: 72 BPM | TEMPERATURE: 98.1 F | RESPIRATION RATE: 20 BRPM

## 2025-05-13 DIAGNOSIS — Z90.2 HISTORY OF LOBECTOMY OF LUNG: ICD-10-CM

## 2025-05-13 DIAGNOSIS — Z08 ENCOUNTER FOR FOLLOW-UP EXAMINATION AFTER COMPLETED TREATMENT FOR MALIGNANT NEOPLASM: ICD-10-CM

## 2025-05-13 DIAGNOSIS — C34.31 PRIMARY SQUAMOUS CELL CARCINOMA OF LOWER LOBE OF RIGHT LUNG: ICD-10-CM

## 2025-05-13 DIAGNOSIS — Z99.81 OXYGEN DEPENDENT: ICD-10-CM

## 2025-05-13 DIAGNOSIS — C34.12 MALIGNANT NEOPLASM OF UPPER LOBE OF LEFT LUNG: ICD-10-CM

## 2025-05-13 DIAGNOSIS — J44.9 CHRONIC OBSTRUCTIVE PULMONARY DISEASE, UNSPECIFIED COPD TYPE: ICD-10-CM

## 2025-05-13 DIAGNOSIS — N18.6 ESRD (END STAGE RENAL DISEASE) ON DIALYSIS: ICD-10-CM

## 2025-05-13 DIAGNOSIS — Z92.3 STATUS POST RADIATION THERAPY WITHIN FOUR TO TWELVE WEEKS: ICD-10-CM

## 2025-05-13 DIAGNOSIS — C34.11 MALIGNANT NEOPLASM OF UPPER LOBE OF RIGHT LUNG: ICD-10-CM

## 2025-05-13 DIAGNOSIS — C34.31 MALIGNANT NEOPLASM OF LOWER LOBE OF RIGHT LUNG: Primary | ICD-10-CM

## 2025-05-13 DIAGNOSIS — Z99.2 ESRD (END STAGE RENAL DISEASE) ON DIALYSIS: ICD-10-CM

## 2025-05-13 PROCEDURE — G0463 HOSPITAL OUTPT CLINIC VISIT: HCPCS | Performed by: NURSE PRACTITIONER

## 2025-05-13 NOTE — PROGRESS NOTES
Follow Up Office Visit      Encounter Date: 05/13/2025   Patient Name: Charlette Rai  YOB: 1935   Medical Record Number: [id number]   Primary Diagnosis: Malignant neoplasm of lower lobe of right lung [C34.31]     Cancer Staging   Malignant neoplasm of lower lobe of left lung  Staging form: Lung, AJCC V9  - Clinical: cT2, cN0, cM0 - Signed by Brennan Medrano MD on 2/6/2025    Radiation Completion Date:    3/3/2025 SBRT RLL   6/23/2021 SBRT RUL     Chief Complaint:    Chief Complaint   Patient presents with    Follow-up    Lung Cancer       Oncology/Hematology History   Malignant neoplasm of upper lobe of right lung   6/7/2021 Initial Diagnosis    Malignant neoplasm of upper lobe of right lung (CMS/HCC)     6/14/2021 - 6/23/2021 Radiation    Radiation OncologyTreatment Course:  Charlette Rai received 5500 cGy in 5 fractions via SBRT to the right upper lobe.     Malignant neoplasm of lower lobe of left lung   2/6/2025 Initial Diagnosis    Malignant neoplasm of lower lobe of left lung     2/6/2025 Cancer Staged    Staging form: Lung, AJCC V9  - Clinical: cT2, cN0, cM0 - Signed by Brennan Medrano MD on 2/6/2025     Malignant neoplasm of lower lobe, right bronchus or lung   2/17/2025 Initial Diagnosis    Malignant neoplasm of lower lobe, right bronchus or lung     2/25/2025 - 3/3/2025 Radiation    Radiation OncologyTreatment Course:  Charlette Rai received 5500 cGy in 5 fractions via SBRT to the right lower lobe.          History of Present Illness: Charlette Rai is a 89 y.o. female who [place] [facility] [place] interval follow-up after completing stereotactic body radiotherapy on 3/3/25. She is accompanied by her  and daughter.            History of Present Illness  She reports no significant changes in her respiratory status since undergoing radiation. She chronically has shortness of breath and wears 3 to 4 liters of oxygen continuously. She  "does not report any expectoration of blood but mentions a recent onset of a dry cough over the past few weeks, which she believes may be related to seasonal allergies. She has chronic back pain that remains unchanged.     Subjective      Review of Systems: Review of Systems   Constitutional:  Positive for fatigue (8/10) and unexpected weight change. Negative for activity change, appetite change, chills and fever.   Respiratory:  Positive for cough (mild dry cough at times), chest tightness and shortness of breath (\"most of the time\"). Negative for wheezing.    Cardiovascular:  Negative for chest pain, palpitations and leg swelling.   Genitourinary:  Negative for dysuria, frequency, hematuria and urgency.        Attends hemodialysis 3x/week   Musculoskeletal:  Positive for back pain and gait problem (in wheelchair for this appt, uses walker at home).   Neurological:  Positive for weakness (generalized). Negative for dizziness, speech difficulty, numbness and headaches (occasional).   Psychiatric/Behavioral:  Negative for sleep disturbance.        The following portions of the patient's history were reviewed and updated as appropriate: allergies, current medications, past family history, past medical history, past social history, past surgical history and problem list.    Medications:     Current Outpatient Medications:     acetaminophen (TYLENOL) 325 MG tablet, Take 2 tablets by mouth Every 6 (Six) Hours As Needed for Mild Pain., Disp: , Rfl:     albuterol sulfate  (90 Base) MCG/ACT inhaler, Inhale 2 puffs Every 4 (Four) Hours As Needed for Wheezing., Disp: 18 g, Rfl: 11    amLODIPine (NORVASC) 5 MG tablet, Take 1 tablet by mouth Daily. Increase dose to 5mg and take every day, Disp: 30 tablet, Rfl: 1    aspirin 81 MG EC tablet, Take 1 tablet by mouth Daily., Disp: , Rfl:     budesonide (Pulmicort) 0.5 MG/2ML nebulizer solution, Take 2 mL by nebulization 2 (Two) Times a Day., Disp: 60 each, Rfl: 5    carvedilol " (COREG) 25 MG tablet, Take 1 tablet by mouth 2 (Two) Times a Day With Meals., Disp: 180 tablet, Rfl: 3    clopidogrel (Plavix) 75 MG tablet, Take 1 tablet by mouth Daily., Disp: 90 tablet, Rfl: 3    dexlansoprazole (DEXILANT) 60 MG capsule, Take 1 capsule by mouth Daily., Disp: 90 capsule, Rfl: 3    ferrous gluconate (FERGON) 324 MG tablet, Take 1 tablet by mouth 3 (Three) Times a Week., Disp: 60 tablet, Rfl: 1    fluticasone (FLONASE) 50 MCG/ACT nasal spray, Administer 1 spray into the nostril(s) as directed by provider Daily., Disp: , Rfl:     furosemide (LASIX) 20 MG tablet, Take 1 tablet by mouth Daily. (Patient taking differently: Take 1 tablet by mouth Daily. Takes on 40 mg on Thursdays), Disp: 90 tablet, Rfl: 3    ipratropium-albuterol (DUO-NEB) 0.5-2.5 mg/3 ml nebulizer, Take 3 mL by nebulization Every 6 (Six) Hours As Needed for Wheezing., Disp: 360 mL, Rfl: 2    isosorbide mononitrate (IMDUR) 30 MG 24 hr tablet, Take 1 tablet by mouth Every Night., Disp: 90 tablet, Rfl: 3    levocetirizine (XYZAL) 5 MG tablet, TAKE 1 TABLET DAILY, Disp: 90 tablet, Rfl: 1    levothyroxine (SYNTHROID, LEVOTHROID) 50 MCG tablet, Take 1 tablet by mouth Daily., Disp: , Rfl:     lidocaine-prilocaine (EMLA) 2.5-2.5 % cream, Apply 1 Application topically to the appropriate area as directed Take As Directed. Apply to dialysis access site 30-45 minutes prior to dialysis, Disp: , Rfl:     losartan (Cozaar) 25 MG tablet, Take 1 tablet by mouth Every Night. Half tab for one week then 1 tab if SBP is greater than 140, Disp: 90 tablet, Rfl: 0    losartan (Cozaar) 50 MG tablet, Take 1 tablet by mouth 2 (Two) Times a Day., Disp: 60 tablet, Rfl: 5    Methoxy PEG-Epoetin Beta (MIRCERA IJ), 200 mcg Every 14 (Fourteen) Days., Disp: , Rfl:     Psyllium ([facility] DAILY FIBER PO), Take 2 tablets by mouth Daily., Disp: , Rfl:     rosuvastatin (CRESTOR) 20 MG tablet, Take 1 tablet by mouth Every Night., Disp: 90 tablet, Rfl: 3    sevelamer (RENVELA) 800  MG tablet, Take 1 tablet by mouth 3 (Three) Times a Day With Meals., Disp: 270 tablet, Rfl: 3    tobramycin PF (JUAN) 300 MG/5ML nebulizer solution, Take 5 mL by nebulization 2 (Two) Times a Day. 30 days on 30 days off, Disp: 300 mL, Rfl: 6    vitamin D (ERGOCALCIFEROL) 1.25 MG (22289 UT) capsule capsule, Take 1 capsule by mouth Every 14 (Fourteen) Days. (Patient taking differently: Take 1 capsule by mouth Every 14 (Fourteen) Days. The 15th and 30 of the month), Disp: 6 capsule, Rfl: 3    Allergies:   No Known Allergies    Patient Smoking History:   Social History     Tobacco Use   Smoking Status Former    Current packs/day: 0.00    Average packs/day: 1 pack/day for 52.5 years (52.5 ttl pk-yrs)    Types: Cigarettes    Start date:     Quit date: 2004    Years since quittin.9    Passive exposure: Past   Smokeless Tobacco Never       Measures:  PHQ-9 Total Score: 4   Quality of Life: 80 - Restricted Physical Activity   ECOG score: 2  ECOG: (3) Capable of limited self-care, confined to bed or chair > 50% of waking hours  Pain: (on a scale of 0-10)   Pain Score    25 1308   PainSc: 7    PainLoc: Back     Charlette Rai reports a pain score of 7.  Given her pain assessment as noted, treatment options were discussed and the following options were decided upon as a follow-up plan to address the patient's pain: use of non-medical modalities (ice, heat, stretching and/or behavior modifications).     Objective     Physical Exam:   Vital Signs:   Vitals:    25 1308   BP: 144/53   Pulse: 72   Resp: 20   Temp: 98.1 °F (36.7 °C)   TempSrc: Temporal   SpO2: 100%  Comment: O2 3L/min via n/c   Weight: 64.8 kg (142 lb 13.7 oz)   PainSc: 7    PainLoc: Back     Body mass index is 23.77 kg/m².   Wt Readings from Last 3 Encounters:   25 64.8 kg (142 lb 13.7 oz)   25 65.2 kg (143 lb 12.8 oz)   25 64.9 kg (143 lb)       Physical Exam  Vitals reviewed.   Constitutional:       General: She is  not in acute distress.     Appearance: Normal appearance. She is normal weight. She is not ill-appearing.      Comments: Sitting comfortably in wheelchair    HENT:      Head: Normocephalic and atraumatic.   Eyes:      Conjunctiva/sclera: Conjunctivae normal.      Pupils: Pupils are equal, round, and reactive to light.   Pulmonary:      Effort: Pulmonary effort is normal. No respiratory distress.      Breath sounds: Normal breath sounds.      Comments: Diminished throughout. On 3L oxygen.   Musculoskeletal:         General: Normal range of motion.      Cervical back: Normal range of motion.   Skin:     General: Skin is warm and dry.   Neurological:      General: No focal deficit present.      Mental Status: She is alert and oriented to person, place, and time. Mental status is at baseline.   Psychiatric:         Mood and Affect: Mood normal.         Behavior: Behavior normal.       Result Review: I independently reviewed the following data.   -- CT Chest Without Contrast Diagnostic (05/06/2025 13:15)     Pathology:   Lab Results   Component Value Date    CLININFO  01/31/2025     Right lung lesion      FINALDX  01/31/2025     Lung, right lung lesion, core biopsy:   - Squamous cell carcinoma     The above positive (malignant) diagnosis was called to Fabienne St RN in Dr. KALEY Yanez's office at 07:45 EST on 2/4/2025 and also to Zoey SAGASTUME in  BI Mosqueda's office at 08:06 ESTon 2/4/2025.             Imaging: CT Chest Without Contrast Diagnostic  Result Date: 5/8/2025  Impression: Previous left upper lobectomy. 2.3 cm x 1.0 cm pleural-based density in the right lower lobe posterolaterally consistent with treated neoplasm. The long diameter of the lesion has decreased 0.3 cm in the interval consistent with stable disease by RECIST criteria. Areas of suspected scarring/atelectasis in the mid right lung and lower left lung. Electronically Signed: Nilay Harry MD  5/8/2025 5:47 PM EDT  Workstation ID: [id number]    XR  Chest 1 View  Result Date: 2/19/2025  Impression: 1. Right IJ dialysis catheter is in good position. 2. Stable 2.6 cm right lower lobe nodule as seen on the prior PET/CT scan. Electronically Signed: Gualberto Howell MD  2/19/2025 6:03 PM EST  Workstation ID: [id number]    NM PET/CT Skull Base to Mid Thigh  Result Date: 2/14/2025  Impression: 1.2.6 x 2.2 cm irregular nodule in the right lower lobe abutting the pleura with mild cavitation is markedly hypermetabolic up to 19.47 SUV max. This is compatible with the patient's known primary lung cancer. No hypermetabolic or enlarged thoracic lymph  nodes. No evidence of metastatic disease in the neck, abdomen, pelvis or osseous structures. 2.Stable treatment related changes in the right upper lobe and left lung. Electronically Signed: Michael Velazco DO  2/14/2025 4:55 PM EST  Workstation ID: [id number]    MRI Brain With & Without Contrast  Result Date: 2/12/2025  Impression: 1.No definite evidence for metastatic disease. 2.T2 signal change involving the cerebral hemispheres and milton that could reflect more chronic small vessel ischemic change. 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 Electronically Signed: Tony Kurtz MD  2/12/2025 11:20 AM EST  Workstation ID: [id number]     Labs:   WBC   Date Value Ref Range Status   03/20/2025 5.21 3.40 - 10.80 10*3/mm3 Final     Hemoglobin   Date Value Ref Range Status   05/07/2025 9.8 (L) 12.0 - 16.0 g/dL Final   05/07/2025 29.4 (L) 36.0 - 48.0 % Final     Hematocrit   Date Value Ref Range Status   03/20/2025 44.0 34.0 - 46.6 % Final     Platelets   Date Value Ref Range Status   03/20/2025 113 (L) 140 - 450 10*3/mm3 Final     Creatinine   Date Value Ref Range Status   03/18/2025 3.72 (H) 0.57 - 1.00 mg/dL Final   08/21/2024 3.45 (H) 0.60 - 1.30 mg/dL Final     BUN   Date Value Ref Range Status   03/18/2025 25 (H) 8 - 23 mg/dL Final     eGFR   Date Value Ref Range Status    03/18/2025 11.2 (L) >60.0 mL/min/1.73 Final     TSH   Date Value Ref Range Status   10/31/2024 1.800 0.270 - 4.200 uIU/mL Final     Assessment / Plan      Impression: Charlette Rai is a pleasant 89 y.o. female with history of pT2 pN1 non-small cell lung cancer status post left upper lobe lobectomy in 2005 followed by chemotherapy. She subsequently developed presumed NSCLC right upper lobe lesion that was FDG avid consistent with primary lung malignancy. She completed SBRT to the right upper lobe nodule in 2021 with a good radiographic response. She most recently developed an enlarging right lower lobe nodule. Pathology from biopsy of right lower lobe nodule on 1/31/2025 revealed squamous cell carcinoma; cT2a cN0. She is status post SBRT to the right lower lobe, completed on 3/3/2025. Received 5500 cGy in 5 fractions. Restaging CT chest on 5/6/25 demonstrates interval decrease in size of the treated right lower lobe nodule, measuring 2.3 x 1.0 cm (previously measured 2.6 x 2.5 cm on prior PET/CT). Stable posttreatment change in the right perihilar region and previous left upper lobectomy. Will repeat imaging in 6 months for surveillance.     COPD: followed by Pulmonology.     Assessment/Plan:   Diagnoses and all orders for this visit:    1. Malignant neoplasm of lower lobe of right lung (Primary)  -     CT Chest Without Contrast; Future    2. 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 Status post radiation therapy within four to twelve weeks    4. Encounter for follow-up examination after completed treatment for malignant neoplasm    5. Malignant neoplasm of upper lobe of right lung  Comments:  History of- treated with SBRT in 2021  Orders:  -     CT Chest Without Contrast; Future    6. Malignant neoplasm of upper lobe of left lung  Comments:  History of- treated with lobectomy in 2005  Orders:  -     CT Chest Without Contrast; Future    7. History  of lobectomy of lung  Comments:  left upper lobe lobectomy in 2005 per Dr. Kimble  Orders:  -     CT Chest Without Contrast; Future    8. Chronic obstructive pulmonary disease, unspecified COPD type  -     CT Chest Without Contrast; Future    9. ESRD (end stage renal disease) on dialysis  Comments:  She continues to undergo dialysis on Monday, Wednesday, and Friday.    10. Oxygen dependent      Assessment & Plan  1. Lung cancer.  The recent CT scan results were satisfactory, showing a reduction in the size of the previously treated area in the right lower lung. This is a positive development, especially given the short time since the completion of treatment. The expectation is for continued shrinkage over the next 6 months to a year. The treatment plan includes scheduling another CT scan of the chest in 6 months, with follow-up appointments to monitor progress. This schedule is based on the understanding that significant changes in the lung nodule size are expected over a longer period, typically up to a year.     2. Dry cough.  The patient reports a new dry cough over the last couple of weeks, which she attributes to weather changes and allergies.The patient is advised to monitor her dry cough and to contact our office if it worsens or persists.     Follow Up:   Return for follow-up in 6 months with CT chest prior. Appointments to be on a Tuesday or Thursday due to dialysis.     Return in about 6 months (around 11/13/2025) for Office Visit.  Charlette Rai was encouraged to contact me in the interim with any questions or concerns regarding her care.      DAT Yanez  Radiation Oncology  [facility]    This document has been signed by DAT Aguilar on May 13, 2025 13:35 EDT     Patient or patient representative verbalized consent for the use of Ambient Listening during the visit with  DAT Aguilar for chart documentation. 5/13/2025  13:15 EDT

## 2025-05-23 DIAGNOSIS — Z99.2 ESRD ON DIALYSIS: Primary | ICD-10-CM

## 2025-05-23 DIAGNOSIS — I70.1 RENAL ARTERY STENOSIS: ICD-10-CM

## 2025-05-23 DIAGNOSIS — N18.6 ESRD ON DIALYSIS: Primary | ICD-10-CM

## 2025-05-23 RX ORDER — CLOPIDOGREL BISULFATE 75 MG/1
75 TABLET ORAL DAILY
Qty: 90 TABLET | Refills: 3 | Status: SHIPPED | OUTPATIENT
Start: 2025-05-23 | End: 2026-05-23

## 2025-06-03 ENCOUNTER — OFFICE VISIT (OUTPATIENT)
Dept: INTERNAL MEDICINE | Age: 89
End: 2025-06-03
Payer: MEDICARE

## 2025-06-03 ENCOUNTER — HOSPITAL ENCOUNTER (OUTPATIENT)
Dept: GENERAL RADIOLOGY | Facility: HOSPITAL | Age: 89
Discharge: HOME | End: 2025-06-03
Payer: MEDICARE

## 2025-06-03 VITALS
HEART RATE: 60 BPM | SYSTOLIC BLOOD PRESSURE: 138 MMHG | RESPIRATION RATE: 20 BRPM | BODY MASS INDEX: 24.49 KG/M2 | DIASTOLIC BLOOD PRESSURE: 62 MMHG | OXYGEN SATURATION: 96 % | HEIGHT: 65 IN | TEMPERATURE: 98.4 F | WEIGHT: 147 LBS

## 2025-06-03 DIAGNOSIS — M79.672 LEFT FOOT PAIN: ICD-10-CM

## 2025-06-03 DIAGNOSIS — M79.605 LEFT LEG PAIN: ICD-10-CM

## 2025-06-03 DIAGNOSIS — M79.89 LEG SWELLING: ICD-10-CM

## 2025-06-03 DIAGNOSIS — L03.116 CELLULITIS OF FOOT, LEFT: ICD-10-CM

## 2025-06-03 DIAGNOSIS — M79.605 LEFT LEG PAIN: Primary | ICD-10-CM

## 2025-06-03 PROCEDURE — 73630 X-RAY EXAM OF FOOT: CPT

## 2025-06-03 PROCEDURE — 99214 OFFICE O/P EST MOD 30 MIN: CPT

## 2025-06-03 PROCEDURE — 1125F AMNT PAIN NOTED PAIN PRSNT: CPT

## 2025-06-03 PROCEDURE — 73610 X-RAY EXAM OF ANKLE: CPT

## 2025-06-03 RX ORDER — DOXYCYCLINE 100 MG/1
100 CAPSULE ORAL 2 TIMES DAILY
Qty: 20 CAPSULE | Refills: 0 | Status: SHIPPED | OUTPATIENT
Start: 2025-06-03

## 2025-06-03 NOTE — PROGRESS NOTES
Chief Complaint  Edema (89 year old female here today complaining of left leg swelling and her toes turning blue for about 1 week. States she thought her shoes caused to bruising but after changing shoes the signs and symptoms did not go away. Denies any known injury)    History of Present Illness  SUBJECTIVE  Charlette Rai presents to [facility] INTERNAL MEDICINE     History of Present Illness  The patient presents for evaluation of left leg pain.    She reports an incident where her foot became entangled in a lift chair, resulting in swelling and discoloration. The onset of these symptoms was sudden, occurring last week. Initially, she suspected a simple bruise on her toe and attempted to alleviate the discomfort by changing her footwear, but this intervention proved ineffective. She experiences difficulty in ambulation and shoe application due to the pain, which is primarily localized in her foot. She also reports tenderness upon palpation of her foot. She recalls a single episode of feeling cold at night, which was resolved with a throw. She has not experienced any recent falls. She has been utilizing compression stockings, but notes that they exacerbate the swelling in her legs.    She is currently on Plavix and baby aspirin. She has undergone dialysis  and continues to follow up with her vascular surgeon. A vascular study was performed earlier this year, revealing some insufficiency, particularly on the left side. She was admitted for left lower extremity pain and peripheral vascular disease in 01/2025.      Past Medical History:   Diagnosis Date    Allergic rhinitis     Anaphylactic shock, unspecified, initial encounter 12/05/2023    Anemia     Arthritis     Asthma     USE INHALERS AND NEBULIZERS    Back pain     Bladder disorder     Cancer     LEFT LUNG CANCER 2005 SURGERY AND CHEMO DONE  AND CURRENTLY 4/ 14/22  RIGHT LUNG CANCER HAS ONLY RECEIVED RADIATION THUS FAR    Chronic kidney  disease     stage 3    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 DENIES CP BUT DOES HAVE SOA CHRONIC ISSUE COPD/LUNG CANCER    COPD (chronic obstructive pulmonary disease)     FOLLOWED BY DR MARIAJOSE BAILEY    Coronary artery disease     DENIES CP BUT DOES GET SOA MOST OF THE TIME WITH EXERTION BUT OCC AT REST CHRONIC ISSUE COPD/LUNG CANCER    Deep vein thrombosis     Diabetes mellitus     DOES NOT CHECK BS DAILY    Disease of thyroid gland     HYPOTHYROIDISM    Essential hypertension     Gastric ulcer     GERD (gastroesophageal reflux disease)     Heart murmur     History of transfusion     NO ISSUES POST TRANSFUSION WAS MANY YEARS AGO    Hyperlipemia     Leukocytopenia     FOLLOWED BY DR MIR BAILEY    Limb swelling     Lumbago     Lumbar spinal stenosis     Lung cancer     Migraine headache     Multiple joint pain     On home oxygen therapy     4L/NC PRN    Osteopenia     PNA (pneumonia) 05/04/2024    Pseudomonas pneumonia 04/29/2024    Reflux esophagitis     Shortness of breath     Thyroid nodule     HAS ULTRASOUND YEARLY BEING MONITORED    Vascular disease     Vitamin D deficiency       Family History   Problem Relation Age of Onset    Arthritis Mother     Arthritis Father     Cancer Brother     Diabetes Brother     Other Brother         blood disease     Prostate cancer Brother     Cancer Brother     Prostate cancer Brother     Cancer Brother     Cancer Brother     Malig Hyperthermia Neg Hx     Colon cancer Neg Hx       Past Surgical History:   Procedure Laterality Date    ABDOMINAL SURGERY      ANGIOPLASTY RENAL ARTERY Left 06/14/2024    stent placement    APPENDECTOMY      ARTERIOGRAM N/A 6/14/2024    Procedure: Arteriogram, right radial access, aortogram and renal arteriogram with possible intervention.;  Surgeon: Dio Miguel MD;  Location: [facility]  CATH INVASIVE LOCATION;  Service: Peripheral Vascular;  Laterality: N/A;    ARTERIOVENOUS FISTULA/SHUNT SURGERY Left 05/10/2022    Procedure: Left basilic vein transposition;  Surgeon: Wan Barksdale MD;  Location: [facility] MAIN OR;  Service: Vascular;  Laterality: Left;    ARTERIOVENOUS FISTULA/SHUNT SURGERY Left 03/23/2023    Procedure: Ligation of left arm arteriovenous fistula;  Surgeon: Wan Barksdale MD;  Location: [facility] MAIN OR;  Service: Vascular;  Laterality: Left;    ARTERIOVENOUS FISTULA/SHUNT SURGERY Left 11/30/2023    Procedure: Creation of left arm arteriovenous graft;  Surgeon: Wan Barksdale MD;  Location: [facility] MAIN OR;  Service: Vascular;  Laterality: Left;    BRONCHOSCOPY N/A 04/24/2024    Procedure: BRONCHOSCOPY WITH BAL AND WASHINGS;  Surgeon: Khalif Yanez MD;  Location: [facility] ENDOSCOPY;  Service: Pulmonary;  Laterality: N/A;  MUCUS PLUGGING    CARDIAC CATHETERIZATION  1996    CARDIAC SURGERY      CARDIAC SURGERY      fluid drained from heart    CATARACT EXTRACTION, BILATERAL  2003    COLONOSCOPY  2014    ENDOSCOPY  2016    2019    FEMORAL ARTERY STENT Bilateral     HYSTERECTOMY      LEG THROMBECTOMY/EMBOLECTOMY Left 1/29/2025    Procedure: LEFT LOWER EXTREMITY ANGIOGRAM;  Surgeon: Jaden Castañeda MD;  Location: [facility] HYBRID OR;  Service: Vascular;  Laterality: Left;    LUNG BIOPSY Left 2005    lobectomy upper lung caner    LUNG VOLUME REDUCTION      OTHER SURGICAL HISTORY      artifical joints/limbs    REPLACEMENT TOTAL KNEE Left 2016    SHUNT O GRAM N/A 1/13/2025    Procedure: dialysis shuntogram;  Surgeon: Court Valente MD;  Location: [facility] CATH INVASIVE LOCATION;  Service: Peripheral Vascular;  Laterality: N/A;    UPPER GASTROINTESTINAL ENDOSCOPY          Current Outpatient Medications:     acetaminophen (TYLENOL) 325 MG tablet, Take 2 tablets by mouth Every 6 (Six) Hours As Needed for Mild Pain., Disp: , Rfl:     albuterol sulfate  (90 Base) MCG/ACT inhaler, Inhale 2 puffs  Every 4 (Four) Hours As Needed for Wheezing., Disp: 18 g, Rfl: 11    amLODIPine (NORVASC) 5 MG tablet, Take 1 tablet by mouth Daily. Increase dose to 5mg and take every day, Disp: 30 tablet, Rfl: 1    aspirin 81 MG EC tablet, Take 1 tablet by mouth Daily., Disp: , Rfl:     budesonide (Pulmicort) 0.5 MG/2ML nebulizer solution, Take 2 mL by nebulization 2 (Two) Times a Day., Disp: 60 each, Rfl: 5    carvedilol (COREG) 25 MG tablet, Take 1 tablet by mouth 2 (Two) Times a Day With Meals., Disp: 180 tablet, Rfl: 3    clopidogrel (Plavix) 75 MG tablet, Take 1 tablet by mouth Daily., Disp: 90 tablet, Rfl: 3    dexlansoprazole (DEXILANT) 60 MG capsule, Take 1 capsule by mouth Daily., Disp: 90 capsule, Rfl: 3    ferrous gluconate (FERGON) 324 MG tablet, Take 1 tablet by mouth 3 (Three) Times a Week., Disp: 60 tablet, Rfl: 1    fluticasone (FLONASE) 50 MCG/ACT nasal spray, Administer 1 spray into the nostril(s) as directed by provider Daily., Disp: , Rfl:     ipratropium-albuterol (DUO-NEB) 0.5-2.5 mg/3 ml nebulizer, Take 3 mL by nebulization Every 6 (Six) Hours As Needed for Wheezing., Disp: 360 mL, Rfl: 2    isosorbide mononitrate (IMDUR) 30 MG 24 hr tablet, Take 1 tablet by mouth Every Night., Disp: 90 tablet, Rfl: 3    levocetirizine (XYZAL) 5 MG tablet, TAKE 1 TABLET DAILY, Disp: 90 tablet, Rfl: 1    levothyroxine (SYNTHROID, LEVOTHROID) 50 MCG tablet, Take 1 tablet by mouth Daily., Disp: , Rfl:     lidocaine-prilocaine (EMLA) 2.5-2.5 % cream, Apply 1 Application topically to the appropriate area as directed Take As Directed. Apply to dialysis access site 30-45 minutes prior to dialysis, Disp: , Rfl:     losartan (Cozaar) 50 MG tablet, Take 1 tablet by mouth 2 (Two) Times a Day., Disp: 60 tablet, Rfl: 5    Psyllium ([employer] DAILY FIBER PO), Take 2 tablets by mouth Daily., Disp: , Rfl:     rosuvastatin (CRESTOR) 20 MG tablet, Take 1 tablet by mouth Every Night., Disp: 90 tablet, Rfl: 3    sevelamer (RENVELA) 800 MG tablet,  "Take 1 tablet by mouth 3 (Three) Times a Day With Meals., Disp: 270 tablet, Rfl: 3    tobramycin PF (JUAN) 300 MG/5ML nebulizer solution, Take 5 mL by nebulization 2 (Two) Times a Day. 30 days on 30 days off, Disp: 300 mL, Rfl: 6    vitamin D (ERGOCALCIFEROL) 1.25 MG (17268 UT) capsule capsule, Take 1 capsule by mouth Every 14 (Fourteen) Days. (Patient taking differently: Take 1 capsule by mouth Every 14 (Fourteen) Days. The 15th and 30th of the month), Disp: 6 capsule, Rfl: 3    doxycycline (VIBRAMYCIN) 100 MG capsule, Take 1 capsule by mouth 2 (Two) Times a Day., Disp: 20 capsule, Rfl: 0    furosemide (LASIX) 20 MG tablet, Take 1 tablet by mouth Daily., Disp: 90 tablet, Rfl: 3    losartan (Cozaar) 25 MG tablet, Take 1 tablet by mouth Every Night. Half tab for one week then 1 tab if SBP is greater than 140 (Patient not taking: Reported on 6/3/2025), Disp: 90 tablet, Rfl: 0    Methoxy PEG-Epoetin Beta (MIRCERA IJ), 200 mcg Every 14 (Fourteen) Days., Disp: , Rfl:     OBJECTIVE  Vital Signs:   /62 (BP Location: Left arm, Patient Position: Sitting)   Pulse 60   Temp 98.4 °F (36.9 °C) (Temporal)   Resp 20   Ht 165.1 cm (65\")   Wt 66.7 kg (147 lb)   LMP  (LMP Unknown)   SpO2 96%   BMI 24.46 kg/m²    Estimated body mass index is 24.46 kg/m² as calculated from the following:    Height as of this encounter: 165.1 cm (65\").    Weight as of this encounter: 66.7 kg (147 lb).     Wt Readings from Last 3 Encounters:   06/06/25 66.7 kg (147 lb 0.8 oz)   06/03/25 66.7 kg (147 lb)   05/13/25 64.8 kg (142 lb 13.7 oz)     BP Readings from Last 3 Encounters:   06/07/25 137/59   06/03/25 138/62   05/13/25 144/53       Physical Exam  Vitals and nursing note reviewed.   Constitutional:       Appearance: Normal appearance.   HENT:      Head: Normocephalic.   Eyes:      Extraocular Movements: Extraocular movements intact.      Conjunctiva/sclera: Conjunctivae normal.   Cardiovascular:      Rate and Rhythm: Normal rate and " regular rhythm.      Heart sounds: Murmur heard.   Pulmonary:      Effort: Pulmonary effort is normal.      Breath sounds: Normal breath sounds.   Abdominal:      General: Bowel sounds are normal.      Palpations: Abdomen is soft.   Skin:            Comments: Left leg/foot erythema/swelling noted   Neurological:      Mental Status: She is alert. Mental status is at baseline.      Motor: Weakness present.   Psychiatric:         Mood and Affect: Mood normal.         Behavior: Behavior normal.         Thought Content: Thought content normal.         Judgment: Judgment normal.          Result Review        CT Chest Without Contrast Diagnostic  Result Date: 5/8/2025  Impression: Previous left upper lobectomy. 2.3 cm x 1.0 cm pleural-based density in the right lower lobe posterolaterally consistent with treated neoplasm. The long diameter of the lesion has decreased 0.3 cm in the interval consistent with stable disease by RECIST criteria. Areas of suspected scarring/atelectasis in the mid right lung and lower left lung. Electronically Signed: Nilay Harry MD  5/8/2025 5:47 PM EDT  Workstation ID: [id number]    XR Chest 1 View  Result Date: 2/19/2025  Impression: 1. Right IJ dialysis catheter is in good position. 2. Stable 2.6 cm right lower lobe nodule as seen on the prior PET/CT scan. Electronically Signed: Gualberto Howell MD  2/19/2025 6:03 PM EST  Workstation ID: [id number]    NM PET/CT Skull Base to Mid Thigh  Result Date: 2/14/2025  Impression: 1.2.6 x 2.2 cm irregular nodule in the right lower lobe abutting the pleura with mild cavitation is markedly hypermetabolic up to 19.47 SUV max. This is compatible with the patient's known primary lung cancer. No hypermetabolic or enlarged thoracic lymph  nodes. No evidence of metastatic disease in the neck, abdomen, pelvis or osseous structures. 2.Stable treatment related changes in the right upper lobe and left lung. Electronically Signed: Michael Velazco DO  2/14/2025 4:55 PM EST   Workstation ID: [id number]    MRI Brain With & Without Contrast  Result Date: 2/12/2025  Impression: 1.No definite evidence for metastatic disease. 2.T2 signal change involving the cerebral hemispheres and milton that could reflect more chronic small vessel ischemic change. 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          Patient Care Team:  Brennan Mosqueda MD as PCP - General (Internal Medicine)  Regis Mckeon MD as Consulting Physician (Radiation Oncology)  Carlton Casillas MD as Consulting Physician (Gastroenterology)  Susan Marcos APRN as Nurse Practitioner (Gastroenterology)    BMI is within normal parameters. No other follow-up for BMI required.       ASSESSMENT & PLAN    Diagnoses and all orders for this visit:    1. Left leg pain (Primary)  -     XR Foot 3+ View Left; Future  -     XR Ankle 3+ View Left; Future    2. Leg swelling  -     XR Foot 3+ View Left; Future  -     XR Ankle 3+ View Left; Future    3. Left foot pain  -     XR Foot 3+ View Left; Future  -     XR Ankle 3+ View Left; Future    4. Cellulitis of foot, left  -     doxycycline (VIBRAMYCIN) 100 MG capsule; Take 1 capsule by mouth 2 (Two) Times a Day.  Dispense: 20 capsule; Refill: 0         Assessment & Plan  1. Left foot pain.  - The pain began last week after the foot was caught in a lift chair. The foot is described as tender, warm, and swollen, with difficulty walking and putting on shoes.  - Physical examination reveals tenderness, warmth, and swelling in the left foot. Pulses are faint due to swelling.  - An x-ray of the left leg./foot and an ultrasound of the leg will be ordered to rule out clots. There is concern for cellulitis due to the redness and warmth.  - Doxy will be  prescribed to be taken three times a day to cover potential cellulitis. The patient is advised to use Ace bandages for compression instead of compression stockings, which have caused increased swelling in the past.    2. Peripheral vascular disease.  - The patient has a history of peripheral vascular disease and was admitted to the hospital in 01/2025 for left lower extremity pain and weakness.  - A vascular study done earlier this year showed some insufficiency. The patient is currently on Plavix and baby aspirin.  - Review of records indicates a history of peripheral vascular disease with previous vascular surgery consultation and rechecked ABIs.  - A follow-up with the vascular surgeon was recommended to determine if a repeat vascular study is needed.      Tobacco Use: Medium Risk (6/6/2025)    Patient History     Smoking Tobacco Use: Former     Smokeless Tobacco Use: Never     Passive Exposure: Past       Follow Up     No follow-ups on file.    Please note that portions of this note were completed with a voice recognition program.    Patient was given instructions and counseling regarding her condition or for health maintenance advice. Please see specific information pulled into the AVS if appropriate.   I have reviewed information obtained and documented by others and I have confirmed the accuracy of this documented note.    DAT Madison    Patient or patient representative verbalized consent for the use of Ambient Listening during the visit with  DAT Madison for chart documentation. 6/11/2025  09:27 EDT

## 2025-06-05 ENCOUNTER — RESULTS FOLLOW-UP (OUTPATIENT)
Dept: INTERNAL MEDICINE | Age: 89
End: 2025-06-05
Payer: MEDICARE

## 2025-06-05 DIAGNOSIS — M79.89 LEG SWELLING: ICD-10-CM

## 2025-06-05 DIAGNOSIS — M79.605 LEFT LEG PAIN: Primary | ICD-10-CM

## 2025-06-05 DIAGNOSIS — R23.0 TOE CYANOSIS: ICD-10-CM

## 2025-06-05 NOTE — PROGRESS NOTES
No fractures but there is soft tissue swelling. She does have a bone spur as well. How is she feeling?

## 2025-06-06 ENCOUNTER — APPOINTMENT (OUTPATIENT)
Dept: GENERAL RADIOLOGY | Facility: HOSPITAL | Age: 89
End: 2025-06-06
Payer: MEDICARE

## 2025-06-06 ENCOUNTER — APPOINTMENT (OUTPATIENT)
Dept: CT IMAGING | Facility: HOSPITAL | Age: 89
End: 2025-06-06
Payer: MEDICARE

## 2025-06-06 ENCOUNTER — HOSPITAL ENCOUNTER (EMERGENCY)
Facility: HOSPITAL | Age: 89
Discharge: OTHER INSTITUTION | End: 2025-06-07
Attending: EMERGENCY MEDICINE
Payer: MEDICARE

## 2025-06-06 DIAGNOSIS — S09.90XA TRAUMATIC INJURY OF HEAD, INITIAL ENCOUNTER: ICD-10-CM

## 2025-06-06 DIAGNOSIS — S72.442A: Primary | ICD-10-CM

## 2025-06-06 LAB
ALBUMIN SERPL-MCNC: 3.5 G/DL (ref 3.5–5.2)
ALBUMIN/GLOB SERPL: 1.3 G/DL
ALP SERPL-CCNC: 342 U/L (ref 39–117)
ALT SERPL W P-5'-P-CCNC: 16 U/L (ref 1–33)
ANION GAP SERPL CALCULATED.3IONS-SCNC: 7.9 MMOL/L (ref 5–15)
APTT PPP: 35.2 SECONDS (ref 24.2–34.2)
AST SERPL-CCNC: 23 U/L (ref 1–32)
BASOPHILS # BLD AUTO: 0.03 10*3/MM3 (ref 0–0.2)
BASOPHILS NFR BLD AUTO: 0.4 % (ref 0–1.5)
BILIRUB SERPL-MCNC: 0.4 MG/DL (ref 0–1.2)
BUN SERPL-MCNC: 13.3 MG/DL (ref 8–23)
BUN/CREAT SERPL: 7 (ref 7–25)
CALCIUM SPEC-SCNC: 8.7 MG/DL (ref 8.6–10.5)
CHLORIDE SERPL-SCNC: 100 MMOL/L (ref 98–107)
CO2 SERPL-SCNC: 30.1 MMOL/L (ref 22–29)
CREAT SERPL-MCNC: 1.91 MG/DL (ref 0.57–1)
DEPRECATED RDW RBC AUTO: 59 FL (ref 37–54)
EGFRCR SERPLBLD CKD-EPI 2021: 25 ML/MIN/1.73
EOSINOPHIL # BLD AUTO: 0.25 10*3/MM3 (ref 0–0.4)
EOSINOPHIL NFR BLD AUTO: 3.4 % (ref 0.3–6.2)
ERYTHROCYTE [DISTWIDTH] IN BLOOD BY AUTOMATED COUNT: 15.6 % (ref 12.3–15.4)
GLOBULIN UR ELPH-MCNC: 2.8 GM/DL
GLUCOSE SERPL-MCNC: 119 MG/DL (ref 65–99)
HCT VFR BLD AUTO: 34.9 % (ref 34–46.6)
HGB BLD-MCNC: 10.3 G/DL (ref 12–15.9)
HOLD SPECIMEN: NORMAL
HOLD SPECIMEN: NORMAL
IMM GRANULOCYTES # BLD AUTO: 0.02 10*3/MM3 (ref 0–0.05)
IMM GRANULOCYTES NFR BLD AUTO: 0.3 % (ref 0–0.5)
INR PPP: 1.12 (ref 0.86–1.15)
LYMPHOCYTES # BLD AUTO: 0.65 10*3/MM3 (ref 0.7–3.1)
LYMPHOCYTES NFR BLD AUTO: 9 % (ref 19.6–45.3)
MCH RBC QN AUTO: 30.8 PG (ref 26.6–33)
MCHC RBC AUTO-ENTMCNC: 29.5 G/DL (ref 31.5–35.7)
MCV RBC AUTO: 104.5 FL (ref 79–97)
MONOCYTES # BLD AUTO: 0.79 10*3/MM3 (ref 0.1–0.9)
MONOCYTES NFR BLD AUTO: 10.9 % (ref 5–12)
NEUTROPHILS NFR BLD AUTO: 5.52 10*3/MM3 (ref 1.7–7)
NEUTROPHILS NFR BLD AUTO: 76 % (ref 42.7–76)
NRBC BLD AUTO-RTO: 0 /100 WBC (ref 0–0.2)
PLATELET # BLD AUTO: 135 10*3/MM3 (ref 140–450)
PMV BLD AUTO: 10.3 FL (ref 6–12)
POTASSIUM SERPL-SCNC: 4.1 MMOL/L (ref 3.5–5.2)
PROT SERPL-MCNC: 6.3 G/DL (ref 6–8.5)
PROTHROMBIN TIME: 14.9 SECONDS (ref 11.8–14.9)
QT INTERVAL: 408 MS
QTC INTERVAL: 489 MS
RBC # BLD AUTO: 3.34 10*6/MM3 (ref 3.77–5.28)
SODIUM SERPL-SCNC: 138 MMOL/L (ref 136–145)
WBC NRBC COR # BLD AUTO: 7.26 10*3/MM3 (ref 3.4–10.8)
WHOLE BLOOD HOLD COAG: NORMAL
WHOLE BLOOD HOLD SPECIMEN: NORMAL

## 2025-06-06 PROCEDURE — 25010000002 ONDANSETRON PER 1 MG: Performed by: EMERGENCY MEDICINE

## 2025-06-06 PROCEDURE — 73552 X-RAY EXAM OF FEMUR 2/>: CPT

## 2025-06-06 PROCEDURE — 96376 TX/PRO/DX INJ SAME DRUG ADON: CPT

## 2025-06-06 PROCEDURE — 85730 THROMBOPLASTIN TIME PARTIAL: CPT

## 2025-06-06 PROCEDURE — 96374 THER/PROPH/DIAG INJ IV PUSH: CPT

## 2025-06-06 PROCEDURE — 25010000002 HYDROMORPHONE 1 MG/ML SOLUTION: Performed by: EMERGENCY MEDICINE

## 2025-06-06 PROCEDURE — 73502 X-RAY EXAM HIP UNI 2-3 VIEWS: CPT

## 2025-06-06 PROCEDURE — 96375 TX/PRO/DX INJ NEW DRUG ADDON: CPT

## 2025-06-06 PROCEDURE — 85025 COMPLETE CBC W/AUTO DIFF WBC: CPT | Performed by: EMERGENCY MEDICINE

## 2025-06-06 PROCEDURE — 99285 EMERGENCY DEPT VISIT HI MDM: CPT

## 2025-06-06 PROCEDURE — 85610 PROTHROMBIN TIME: CPT

## 2025-06-06 PROCEDURE — 80053 COMPREHEN METABOLIC PANEL: CPT | Performed by: EMERGENCY MEDICINE

## 2025-06-06 PROCEDURE — 93005 ELECTROCARDIOGRAM TRACING: CPT

## 2025-06-06 PROCEDURE — 71045 X-RAY EXAM CHEST 1 VIEW: CPT

## 2025-06-06 PROCEDURE — 99291 CRITICAL CARE FIRST HOUR: CPT

## 2025-06-06 PROCEDURE — 70450 CT HEAD/BRAIN W/O DYE: CPT

## 2025-06-06 PROCEDURE — 72125 CT NECK SPINE W/O DYE: CPT

## 2025-06-06 RX ORDER — SODIUM CHLORIDE 0.9 % (FLUSH) 0.9 %
10 SYRINGE (ML) INJECTION AS NEEDED
Status: DISCONTINUED | OUTPATIENT
Start: 2025-06-06 | End: 2025-06-07 | Stop reason: HOSPADM

## 2025-06-06 RX ORDER — ONDANSETRON 2 MG/ML
4 INJECTION INTRAMUSCULAR; INTRAVENOUS ONCE
Status: COMPLETED | OUTPATIENT
Start: 2025-06-06 | End: 2025-06-06

## 2025-06-06 RX ADMIN — HYDROMORPHONE HYDROCHLORIDE 0.5 MG: 1 INJECTION, SOLUTION INTRAMUSCULAR; INTRAVENOUS; SUBCUTANEOUS at 18:25

## 2025-06-06 RX ADMIN — HYDROMORPHONE HYDROCHLORIDE 0.5 MG: 1 INJECTION, SOLUTION INTRAMUSCULAR; INTRAVENOUS; SUBCUTANEOUS at 23:37

## 2025-06-06 RX ADMIN — ONDANSETRON 4 MG: 2 INJECTION INTRAMUSCULAR; INTRAVENOUS at 18:24

## 2025-06-06 RX ADMIN — ONDANSETRON 4 MG: 2 INJECTION INTRAMUSCULAR; INTRAVENOUS at 23:38

## 2025-06-06 RX ADMIN — HYDROMORPHONE HYDROCHLORIDE 0.5 MG: 1 INJECTION, SOLUTION INTRAMUSCULAR; INTRAVENOUS; SUBCUTANEOUS at 20:56

## 2025-06-06 NOTE — PROGRESS NOTES
Lets have her see her vascular surgeon.  Please have her make an appointment.  If pain or swelling worsens, she needs to go to ED. Thanks

## 2025-06-06 NOTE — ED PROVIDER NOTES
"SHARED VISIT ATTESTATION:    This visit was performed by myself and an APC.  I personally approved the management plan/medical decision making and take responsibility for the patient management.      SHARED VISIT NOTE:    Patient is 89 y.o. year old female that presents to the ED for evaluation of fall.     Physical Exam    ED Course:    /59 (BP Location: Right arm, Patient Position: Lying)   Pulse 79   Temp 97.7 °F (36.5 °C) (Oral)   Resp 19   Ht 165.1 cm (65\")   Wt 66.7 kg (147 lb 0.8 oz)   LMP  (LMP Unknown)   SpO2 96%   BMI 24.47 kg/m²       The following orders were placed and all results were independently analyzed by me:  Orders Placed This Encounter   Procedures    XR Hip With or Without Pelvis 2 - 3 View Left    XR Femur 2 View Left    CT Head Without Contrast    CT Cervical Spine Without Contrast    XR Chest 1 View    Comprehensive Metabolic Panel    [place] Draw    CBC Auto Differential    Protime-INR    aPTT    Urinalysis With Culture If Indicated - Urine, Clean Catch    Obtain & Apply The Following-    IP General Consult (Use specialty-specific consult if known)    Inpatient Orthopedic Surgery Consult    ECG 12 Lead Pre-Op / Pre-Procedure    Insert peripheral IV    CBC & Differential    Green Top (Gel)    Lavender Top    Gold Top - SST    Light Blue Top       Medications Given in the Emergency Department:  Medications   sodium chloride 0.9 % flush 10 mL (has no administration in time range)   HYDROmorphone (DILAUDID) injection 0.5 mg (0.5 mg Intravenous Given 6/6/25 1825)   ondansetron (ZOFRAN) injection 4 mg (4 mg Intravenous Given 6/6/25 1824)   HYDROmorphone (DILAUDID) injection 0.5 mg (0.5 mg Intravenous Given 6/6/25 2056)   ondansetron (ZOFRAN) injection 4 mg (4 mg Intravenous Given 6/6/25 2338)   HYDROmorphone (DILAUDID) injection 0.5 mg (0.5 mg Intravenous Given 6/6/25 2337)        ED Course:    ED Course as of 06/07/25 0206   Fri Jun 06, 2025 1856 XR Hip With or Without Pelvis 2 " - 3 View Left  Impression:  Bony demineralization without acute displaced fracture.   [MV]   1856 XR Femur 2 View Left  Impression:  Acute periprosthetic fracture of the distal femoral metadiaphysis with suspected nondisplaced fracture lines extending to the femoral component of the total knee arthroplasty. [MV]   2048 My interpretation of EKG: Sinus rhythm 86, no acute ischemia [JS]      ED Course User Index  [JS] Ildefonso Hilton MD  [MV] Dylan Patino PA       Labs:    Lab Results (last 24 hours)       Procedure Component Value Units Date/Time    CBC & Differential [[id number]]  (Abnormal) Collected: 06/06/25 1820    Specimen: Blood from Arm, Right Updated: 06/06/25 1828    Narrative:      The following orders were created for panel order CBC & Differential.  Procedure                               Abnormality         Status                     ---------                               -----------         ------                     CBC Auto Differential[[id number]]        Abnormal            Final result               Scan Slide[[id number]]                                                                    Please view results for these tests on the individual orders.    Comprehensive Metabolic Panel [[id number]]  (Abnormal) Collected: 06/06/25 1820    Specimen: Blood from Arm, Right Updated: 06/06/25 1850     Glucose 119 mg/dL      BUN 13.3 mg/dL      Creatinine 1.91 mg/dL      Sodium 138 mmol/L      Potassium 4.1 mmol/L      Comment: Slight hemolysis detected by analyzer. Result may be falsely elevated.        Chloride 100 mmol/L      CO2 30.1 mmol/L      Calcium 8.7 mg/dL      Total Protein 6.3 g/dL      Albumin 3.5 g/dL      ALT (SGPT) 16 U/L      AST (SGOT) 23 U/L      Comment: Slight hemolysis detected by analyzer. Result may be falsely elevated.        Alkaline Phosphatase 342 U/L      Total Bilirubin 0.4 mg/dL      Globulin 2.8 gm/dL      A/G Ratio 1.3 g/dL      BUN/Creatinine Ratio 7.0     Anion Gap 7.9 mmol/L       eGFR 25.0 mL/min/1.73     Narrative:      GFR Categories in Chronic Kidney Disease (CKD)              GFR Category          GFR (mL/min/1.73)    Interpretation  G1                    90 or greater        Normal or high (1)  G2                    60-89                Mild decrease (1)  G3a                   45-59                Mild to moderate decrease  G3b                   30-44                Moderate to severe decrease  G4                    15-29                Severe decrease  G5                    14 or less           Kidney failure    (1)In the absence of evidence of kidney disease, neither GFR category G1 or G2 fulfill the criteria for CKD.    eGFR calculation 2021 CKD-EPI creatinine equation, which does not include race as a factor    CBC Auto Differential [[id number]]  (Abnormal) Collected: 06/06/25 1820    Specimen: Blood from Arm, Right Updated: 06/06/25 1828     WBC 7.26 10*3/mm3      RBC 3.34 10*6/mm3      Hemoglobin 10.3 g/dL      Hematocrit 34.9 %      .5 fL      MCH 30.8 pg      MCHC 29.5 g/dL      RDW 15.6 %      RDW-SD 59.0 fl      MPV 10.3 fL      Platelets 135 10*3/mm3      Neutrophil % 76.0 %      Lymphocyte % 9.0 %      Monocyte % 10.9 %      Eosinophil % 3.4 %      Basophil % 0.4 %      Immature Grans % 0.3 %      Neutrophils, Absolute 5.52 10*3/mm3      Lymphocytes, Absolute 0.65 10*3/mm3      Monocytes, Absolute 0.79 10*3/mm3      Eosinophils, Absolute 0.25 10*3/mm3      Basophils, Absolute 0.03 10*3/mm3      Immature Grans, Absolute 0.02 10*3/mm3      nRBC 0.0 /100 WBC     Protime-INR [[id number]]  (Normal) Collected: 06/06/25 1820    Specimen: Blood from Arm, Right Updated: 06/06/25 1848     Protime 14.9 Seconds      INR 1.12    Narrative:      Suggested Therapeutic Ranges For Oral Anticoagulant Therapy:  Level of Therapy                      INR Target Range  Standard Dose                            2.0-3.0  High Dose                                2.5-3.5  Patients not receiving  anticoagulant  Therapy Normal Range                     0.86-1.15    aPTT [[id number]]  (Abnormal) Collected: 06/06/25 1820    Specimen: Blood from Arm, Right Updated: 06/06/25 1848     PTT 35.2 seconds              Imaging:    XR Chest 1 View  Result Date: 6/6/2025  XR CHEST 1 VW Date of Exam: 6/6/2025 6:33 PM EDT Indication: recent fall, preop Comparison: CT chest from May 6, 2025 Findings: A right internal jugular catheter has its tip at the cavoatrial junction. There is mediastinal shift to the left. There is stable scarring within the right midlung. The left hemithorax is difficult to characterize due to the mediastinal shift although there may be a small left pleural effusion with some left sided airspace disease. The heart and mediastinal contours are likely stable. The pulmonary vascular appears normal. The osseous structures appear intact. The patient is slightly rotated.     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 2.Possible small left pleural effusion with left-sided airspace disease, which could reflect atelectasis or pneumonia. Electronically Signed: Leonardo Herbert MD  6/6/2025 7:15 PM EDT  Workstation ID: [id number]    CT Head Without Contrast  Result Date: 6/6/2025  CT HEAD WO CONTRAST, CT CERVICAL SPINE WO CONTRAST Date of Exam: 6/6/2025 6:27 PM EDT Indication: head trauma. Comparison: None available. Technique: Axial CT images were obtained of the head and cervical spine without contrast administration.  Reconstructed coronal and sagittal images were also obtained. Automated exposure control and iterative construction methods were used. Findings: Head: There is a contusion of the left parietal scalp. No acute calvarial fracture. No acute intracranial hemorrhage. No acute large territory infarct. There are scattered and patchy areas of subcortical and periventricular white matter hypodensity which  is nonspecific and can be seen in the  setting of chronic small vessel ischemic change. No extra-axial collections. No midline shift or herniation. Normal size and configuration of the ventricles. Normal appearance of the orbits. The paranasal sinuses are clear. There is intracranial atherosclerosis. Bilateral mastoid air cell effusions are noted. No acute or suspicious bony findings. Cervical spine: The paravertebral soft tissues are normal. No acute fracture. No loss of vertebral body height. No traumatic subluxation. There is mild to moderate degenerative disc disease in the lower cervical spine. There is bilateral multilevel facet  arthropathy worst in the mid and upper cervical spine. There is a right IJ central venous catheter. There is emphysema. There is heavy atherosclerosis of the thoracic aorta. There are pleural plaques in the left lung. Scarring in the mid right lung. Treatment changes in the left apex.     Impression: Left parietal scalp contusion. No acute intracranial findings. Chronic and senescent changes as above. No acute fracture or traumatic malalignment in the cervical spine. Electronically Signed: Elliott Centeno MD  6/6/2025 7:01 PM EDT  Workstation ID: [id number]    CT Cervical Spine Without Contrast  Result Date: 6/6/2025  CT HEAD WO CONTRAST, CT CERVICAL SPINE WO CONTRAST Date of Exam: 6/6/2025 6:27 PM EDT Indication: head trauma. Comparison: None available. Technique: Axial CT images were obtained of the head and cervical spine without contrast administration.  Reconstructed coronal and sagittal images were also obtained. Automated exposure control and iterative construction methods were used. Findings: Head: There is a contusion of the left parietal scalp. No acute calvarial fracture. No acute intracranial hemorrhage. No acute large territory infarct. There are scattered and patchy areas of subcortical and periventricular white matter hypodensity which  is nonspecific and can be seen in the setting of chronic small vessel  ischemic change. No extra-axial collections. No midline shift or herniation. Normal size and configuration of the ventricles. Normal appearance of the orbits. The paranasal sinuses are clear. There is intracranial atherosclerosis. Bilateral mastoid air cell effusions are noted. No acute or suspicious bony findings. Cervical spine: The paravertebral soft tissues are normal. No acute fracture. No loss of vertebral body height. No traumatic subluxation. There is mild to moderate degenerative disc disease in the lower cervical spine. There is bilateral multilevel facet  arthropathy worst in the mid and upper cervical spine. There is a right IJ central venous catheter. There is emphysema. There is heavy atherosclerosis of the thoracic aorta. There are pleural plaques in the left lung. Scarring in the mid right lung. Treatment changes in the left apex.     Impression: Left parietal scalp contusion. No acute intracranial findings. Chronic and senescent changes as above. No acute fracture or traumatic malalignment in the cervical spine. Electronically Signed: Elliott Centeno MD  6/6/2025 7:01 PM EDT  Workstation ID: [id number]    XR Hip With or Without Pelvis 2 - 3 View Left  Result Date: 6/6/2025  XR HIP W OR WO PELVIS 2-3 VIEW LEFT Date of Exam: 6/6/2025 6:11 PM EDT Indication: fall Comparison: None available. Findings: The bones are demineralized. No acute fracture or traumatic malalignment of the left hip or within the pelvis. Bilateral iliac vascular stents are noted.     Impression: Bony demineralization without acute displaced fracture. Electronically Signed: Elliott Centeno MD  6/6/2025 6:53 PM EDT  Workstation ID: [id number]    XR Femur 2 View Left  Result Date: 6/6/2025  XR FEMUR 2 VW LEFT Date of Exam: 6/6/2025 6:10 PM EDT Indication: fall Comparison: None available. Findings: There is an acute comminuted fracture of the distal femoral metadiaphysis with fracture fragment overlap/foreshortening, medial displacement  of the distal fragment, and apex anterior angulation of approximately 30 degrees. The total knee arthroplasty appears normally aligned. Nondisplaced fracture lines likely extend down to the femoral component of the hardware. There is prepatellar soft tissue swelling. The bones are demineralized.     Impression: Acute periprosthetic fracture of the distal femoral metadiaphysis with suspected nondisplaced fracture lines extending to the femoral component of the total knee arthroplasty. Electronically Signed: Elliott Centeno MD  6/6/2025 6:42 PM EDT  Workstation ID: [id number]      MDM:    Edwin Hilton MD  02:06 EDT  06/07/25         Ildefonso Hilton MD  06/07/25 0206

## 2025-06-06 NOTE — TELEPHONE ENCOUNTER
Name: Sanjuana Cornell    Relationship: Emergency Contact    Best Callback Number: [phone]    HUB PROVIDED THE RELAY MESSAGE FROM THE OFFICE   PATIENT VOICED UNDERSTANDING AND HAS NO FURTHER QUESTIONS AT THIS TIME    ADDITIONAL INFORMATION: PATIENTS DAUGHTER STATES MOM IS STILL HAVING A LOT OF SWELLING AND ALSO SOME ITCHING NOW. SANJUANA STATES PATIENT HAS NOT COMPLAINED OF ANY PAIN YET TODAY. DAUGHTER STATES PATIENT WILL BE HOME AFTER 3 TODAY IF ANYTHING ELSE IS NEEDED.

## 2025-06-06 NOTE — TELEPHONE ENCOUNTER
"Relay     \"No fractures but there is soft tissue swelling. She does have a bone spur as well. How is she feeling? \"                "

## 2025-06-07 VITALS
WEIGHT: 147.05 LBS | RESPIRATION RATE: 19 BRPM | HEIGHT: 65 IN | OXYGEN SATURATION: 96 % | SYSTOLIC BLOOD PRESSURE: 137 MMHG | TEMPERATURE: 97.7 F | HEART RATE: 79 BPM | BODY MASS INDEX: 24.5 KG/M2 | DIASTOLIC BLOOD PRESSURE: 59 MMHG

## 2025-06-07 NOTE — ED PROVIDER NOTES
Time: 8:00 PM EDT  Date of encounter:  6/6/2025  Independent Historian/Clinical History and Information was obtained by:   Patient    History is limited by: N/A    Chief Complaint: Fall      History of Present Illness:  Patient is a 89 y.o. year old female who presents to the emergency department for evaluation of fall. States that she was trying to put on a shirt and got turned around. She fell on her left side and hit her left forehead. She has been unable to get up. She is complaining of left hip and knee pain.  Medical history includes lung cancer, diabetes, COPD, hypertension, CAD. 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 She is on blood thinners.    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 DENIES CP BUT DOES HAVE SOA CHRONIC ISSUE COPD/LUNG CANCER    COPD (chronic obstructive pulmonary disease)     FOLLOWED BY DR MARIAJOSE BAILEY    Coronary artery disease     DENIES CP BUT DOES GET SOA MOST OF THE TIME WITH EXERTION BUT OCC AT REST CHRONIC ISSUE COPD/LUNG CANCER    Deep vein thrombosis     Diabetes mellitus     DOES NOT CHECK BS DAILY    Disease of thyroid gland     HYPOTHYROIDISM    Essential hypertension     Gastric ulcer     GERD (gastroesophageal reflux disease)     Heart murmur     History of transfusion     NO  ISSUES POST TRANSFUSION WAS MANY YEARS AGO    Hyperlipemia     Leukocytopenia     FOLLOWED BY DR MIR BAILEY    Limb swelling     Lumbago     Lumbar spinal stenosis     Lung cancer     Migraine headache     Multiple joint pain     On home oxygen therapy     4L/NC PRN    Osteopenia     PNA (pneumonia) 05/04/2024    Pseudomonas pneumonia 04/29/2024    Reflux esophagitis     Shortness of breath     Thyroid nodule     HAS ULTRASOUND YEARLY BEING MONITORED    Vascular disease     Vitamin D deficiency      Past Surgical History:   Procedure Laterality Date    ABDOMINAL SURGERY      ANGIOPLASTY RENAL ARTERY Left 06/14/2024    stent placement    APPENDECTOMY      ARTERIOGRAM N/A 6/14/2024    Procedure: Arteriogram, right radial access, aortogram and renal arteriogram with possible intervention.;  Surgeon: Dio Miguel MD;  Location: [facility] CATH INVASIVE LOCATION;  Service: Peripheral Vascular;  Laterality: N/A;    ARTERIOVENOUS FISTULA/SHUNT SURGERY Left 05/10/2022    Procedure: Left basilic vein transposition;  Surgeon: Wan Barksdale MD;  Location: [facility] MAIN OR;  Service: Vascular;  Laterality: Left;    ARTERIOVENOUS FISTULA/SHUNT SURGERY Left 03/23/2023    Procedure: Ligation of left arm arteriovenous fistula;  Surgeon: Wan Barksdale MD;  Location: [facility] MAIN OR;  Service: Vascular;  Laterality: Left;    ARTERIOVENOUS FISTULA/SHUNT SURGERY Left 11/30/2023    Procedure: Creation of left arm arteriovenous graft;  Surgeon: Wan Barksdale MD;  Location: [facility] MAIN OR;  Service: Vascular;  Laterality: Left;    BRONCHOSCOPY N/A 04/24/2024    Procedure: BRONCHOSCOPY WITH BAL AND WASHINGS;  Surgeon: Khalif Yanez MD;  Location: [facility] ENDOSCOPY;  Service: Pulmonary;  Laterality: N/A;  MUCUS PLUGGING    CARDIAC CATHETERIZATION  1996    CARDIAC SURGERY      CARDIAC SURGERY      fluid drained from heart    CATARACT EXTRACTION, BILATERAL  2003    COLONOSCOPY  2014    ENDOSCOPY  2016    2019    FEMORAL ARTERY STENT Bilateral      HYSTERECTOMY      LEG THROMBECTOMY/EMBOLECTOMY Left 1/29/2025    Procedure: LEFT LOWER EXTREMITY ANGIOGRAM;  Surgeon: Jaden Castañeda MD;  Location: [facility] HYBRID OR;  Service: Vascular;  Laterality: Left;    LUNG BIOPSY Left 2005    lobectomy upper lung caner    LUNG VOLUME REDUCTION      OTHER SURGICAL HISTORY      artifical joints/limbs    REPLACEMENT TOTAL KNEE Left 2016    SHUNT O GRAM N/A 1/13/2025    Procedure: dialysis shuntogram;  Surgeon: Court Valente MD;  Location: [facility] CATH INVASIVE LOCATION;  Service: Peripheral Vascular;  Laterality: N/A;    UPPER GASTROINTESTINAL ENDOSCOPY       Family History   Problem Relation Age of Onset    Arthritis Mother     Arthritis Father     Cancer Brother     Diabetes Brother     Other Brother         blood disease     Prostate cancer Brother     Cancer Brother     Prostate cancer Brother     Cancer Brother     Cancer Brother     Malig Hyperthermia Neg Hx     Colon cancer Neg Hx        Home Medications:  Prior to Admission medications    Medication Sig Start Date End Date Taking? Authorizing Provider   acetaminophen (TYLENOL) 325 MG tablet Take 2 tablets by mouth Every 6 (Six) Hours As Needed for Mild Pain.    Provider, MD Sarah   albuterol sulfate  (90 Base) MCG/ACT inhaler Inhale 2 puffs Every 4 (Four) Hours As Needed for Wheezing. 4/22/25   Khalif Yanez MD   amLODIPine (NORVASC) 5 MG tablet Take 1 tablet by mouth Daily. Increase dose to 5mg and take every day 4/22/25   Paloma Rodas APRN   aspirin 81 MG EC tablet Take 1 tablet by mouth Daily.    Provider, MD Sarah   budesonide (Pulmicort) 0.5 MG/2ML nebulizer solution Take 2 mL by nebulization 2 (Two) Times a Day. 2/4/25   Vandana Hannah APRN   carvedilol (COREG) 25 MG tablet Take 1 tablet by mouth 2 (Two) Times a Day With Meals. 9/24/24   Halima Quick APRN   clopidogrel (Plavix) 75 MG tablet Take 1 tablet by mouth Daily. 5/23/25 5/23/26  Anali Sears APRN   dexlansoprazole  (DEXILANT) 60 MG capsule Take 1 capsule by mouth Daily. 8/5/24   Brennan Mosqueda MD   doxycycline (VIBRAMYCIN) 100 MG capsule Take 1 capsule by mouth 2 (Two) Times a Day. 6/3/25   Haydee Solares APRN   ferrous gluconate (FERGON) 324 MG tablet Take 1 tablet by mouth 3 (Three) Times a Week. 3/26/25   Nicole Stern APRN   fluticasone (FLONASE) 50 MCG/ACT nasal spray Administer 1 spray into the nostril(s) as directed by provider Daily. 9/12/24   Sarah Parker MD   furosemide (LASIX) 20 MG tablet Take 1 tablet by mouth Daily.  Patient taking differently: Take 1 tablet by mouth Daily. Takes on 40 mg on Thursdays 6/18/24   Halima Quick APRN   ipratropium-albuterol (DUO-NEB) 0.5-2.5 mg/3 ml nebulizer Take 3 mL by nebulization Every 6 (Six) Hours As Needed for Wheezing. 2/4/25   Vandana Hannah APRN   isosorbide mononitrate (IMDUR) 30 MG 24 hr tablet Take 1 tablet by mouth Every Night. 9/17/24   Jazzy Addison APRN   levocetirizine (XYZAL) 5 MG tablet TAKE 1 TABLET DAILY 6/23/23   Paloma James MD   levothyroxine (SYNTHROID, LEVOTHROID) 50 MCG tablet Take 1 tablet by mouth Daily. 7/8/24   Sarah Parker MD   lidocaine-prilocaine (EMLA) 2.5-2.5 % cream Apply 1 Application topically to the appropriate area as directed Take As Directed. Apply to dialysis access site 30-45 minutes prior to dialysis 9/13/24   Sarah Parker MD   losartan (Cozaar) 25 MG tablet Take 1 tablet by mouth Every Night. Half tab for one week then 1 tab if SBP is greater than 140  Patient not taking: Reported on 6/3/2025 2/4/25   Halima Quick APRN   losartan (Cozaar) 50 MG tablet Take 1 tablet by mouth 2 (Two) Times a Day. 3/18/25   Brennan Mosqueda MD   Methoxy PEG-Epoetin Beta (MIRCERA IJ) 200 mcg Every 14 (Fourteen) Days. 4/21/25 4/20/26  Provider, MD Sarah   Psyllium ([facility] DAILY FIBER PO) Take 2 tablets by mouth Daily.    Provider, MD Sarah   rosuvastatin (CRESTOR) 20  "MG tablet Take 1 tablet by mouth Every Night. 24   Jazzy Addison APRN   sevelamer (RENVELA) 800 MG tablet Take 1 tablet by mouth 3 (Three) Times a Day With Meals. 24   Brennan Mosqueda MD   tobramycin PF (JUAN) 300 MG/5ML nebulizer solution Take 5 mL by nebulization 2 (Two) Times a Day. 30 days on 30 days off 24   Laura Franco APRN   vitamin D (ERGOCALCIFEROL) 1.25 MG (76100 UT) capsule capsule Take 1 capsule by mouth Every 14 (Fourteen) Days.  Patient taking differently: Take 1 capsule by mouth Every 14 (Fourteen) Days. The  and  of the month 10/24/24   Brennan Mosqueda MD        Social History:   Social History     Tobacco Use    Smoking status: Former     Current packs/day: 0.00     Average packs/day: 1 pack/day for 52.5 years (52.5 ttl pk-yrs)     Types: Cigarettes     Start date:      Quit date: 2004     Years since quittin.9     Passive exposure: Past    Smokeless tobacco: Never   Vaping Use    Vaping status: Never Used   Substance Use Topics    Alcohol use: Not Currently     Comment: beer many years ago    Drug use: Never         Review of Systems:  Review of Systems   Constitutional:  Negative for chills and fever.   HENT:  Negative for ear pain.    Eyes:  Negative for pain.   Respiratory:  Negative for cough and shortness of breath.    Cardiovascular:  Negative for chest pain.   Gastrointestinal:  Negative for abdominal pain, diarrhea, nausea and vomiting.   Genitourinary:  Negative for dysuria.   Musculoskeletal:  Positive for arthralgias and joint swelling.   Skin:  Negative for rash.   Neurological:  Positive for headaches.        Physical Exam:  /59 (BP Location: Right arm, Patient Position: Lying)   Pulse 89   Temp 97.7 °F (36.5 °C) (Oral)   Resp 19   Ht 165.1 cm (65\")   Wt 66.7 kg (147 lb 0.8 oz)   LMP  (LMP Unknown)   SpO2 99%   BMI 24.47 kg/m²     Physical Exam  Vitals and nursing note reviewed.   Constitutional:       " Appearance: Normal appearance.   HENT:      Head: Normocephalic.      Comments: +Contusion on the left parietal head and left eyebrow.     Nose: Nose normal.   Eyes:      Extraocular Movements: Extraocular movements intact.      Conjunctiva/sclera: Conjunctivae normal.      Pupils: Pupils are equal, round, and reactive to light.   Cardiovascular:      Rate and Rhythm: Normal rate and regular rhythm.      Heart sounds: Normal heart sounds.   Pulmonary:      Effort: Pulmonary effort is normal.      Breath sounds: Normal breath sounds.   Abdominal:      General: Abdomen is flat.      Palpations: Abdomen is soft.   Musculoskeletal:         General: Normal range of motion.      Cervical back: Normal range of motion and neck supple.      Comments: Left Leg: TTP to the left hip and left distal femur. Neurovasc intact. DP and PT 2+.     No tenderness and Full ROM to all major joints including the ankle, knees, shoulders, elbows, and wrists.   No focal tenderness to the cervical, thoracic, and lumbar spine.       Skin:     General: Skin is warm and dry.   Neurological:      General: No focal deficit present.      Mental Status: She is alert and oriented to person, place, and time.   Psychiatric:         Mood and Affect: Mood normal.         Behavior: Behavior normal.         Thought Content: Thought content normal.         Judgment: Judgment normal.                    Medical Decision Making:      Comorbidities that affect care:     lung cancer, diabetes, COPD, hypertension, CAD    External Notes reviewed:    None      The following orders were placed and all results were independently analyzed by me:  Orders Placed This Encounter   Procedures    XR Hip With or Without Pelvis 2 - 3 View Left    XR Femur 2 View Left    CT Head Without Contrast    CT Cervical Spine Without Contrast    XR Chest 1 View    Comprehensive Metabolic Panel    Essie Draw    CBC Auto Differential    Protime-INR    aPTT    Urinalysis With Culture If  Indicated - Urine, Clean Catch    Obtain & Apply The Following-    IP General Consult (Use specialty-specific consult if known)    Inpatient Orthopedic Surgery Consult    ECG 12 Lead Pre-Op / Pre-Procedure    Insert peripheral IV    CBC & Differential    Green Top (Gel)    Lavender Top    Gold Top - SST    Light Blue Top       Medications Given in the Emergency Department:  Medications   sodium chloride 0.9 % flush 10 mL (has no administration in time range)   HYDROmorphone (DILAUDID) injection 0.5 mg (0.5 mg Intravenous Given 6/6/25 1825)   ondansetron (ZOFRAN) injection 4 mg (4 mg Intravenous Given 6/6/25 1824)   HYDROmorphone (DILAUDID) injection 0.5 mg (0.5 mg Intravenous Given 6/6/25 2056)        ED Course:    ED Course as of 06/06/25 2218 Fri Jun 06, 2025 1856 XR Hip With or Without Pelvis 2 - 3 View Left  Impression:  Bony demineralization without acute displaced fracture.   [MV]   1856 XR Femur 2 View Left  Impression:  Acute periprosthetic fracture of the distal femoral metadiaphysis with suspected nondisplaced fracture lines extending to the femoral component of the total knee arthroplasty. [MV]   2048 My interpretation of EKG: Sinus rhythm 86, no acute ischemia [JS]      ED Course User Index  [JS] Ildefonso Hilton MD  [MV] Dylan Patino PA       Labs:    Lab Results (last 24 hours)       Procedure Component Value Units Date/Time    CBC & Differential [[id number]]  (Abnormal) Collected: 06/06/25 1820    Specimen: Blood from Arm, Right Updated: 06/06/25 1828    Narrative:      The following orders were created for panel order CBC & Differential.  Procedure                               Abnormality         Status                     ---------                               -----------         ------                     CBC Auto Differential[[id number]]        Abnormal            Final result               Scan Slide[[id number]]                                                                    Please view  results for these tests on the individual orders.    Comprehensive Metabolic Panel [[id number]]  (Abnormal) Collected: 06/06/25 1820    Specimen: Blood from Arm, Right Updated: 06/06/25 1850     Glucose 119 mg/dL      BUN 13.3 mg/dL      Creatinine 1.91 mg/dL      Sodium 138 mmol/L      Potassium 4.1 mmol/L      Comment: Slight hemolysis detected by analyzer. Result may be falsely elevated.        Chloride 100 mmol/L      CO2 30.1 mmol/L      Calcium 8.7 mg/dL      Total Protein 6.3 g/dL      Albumin 3.5 g/dL      ALT (SGPT) 16 U/L      AST (SGOT) 23 U/L      Comment: Slight hemolysis detected by analyzer. Result may be falsely elevated.        Alkaline Phosphatase 342 U/L      Total Bilirubin 0.4 mg/dL      Globulin 2.8 gm/dL      A/G Ratio 1.3 g/dL      BUN/Creatinine Ratio 7.0     Anion Gap 7.9 mmol/L      eGFR 25.0 mL/min/1.73     Narrative:      GFR Categories in Chronic Kidney Disease (CKD)              GFR Category          GFR (mL/min/1.73)    Interpretation  G1                    90 or greater        Normal or high (1)  G2                    60-89                Mild decrease (1)  G3a                   45-59                Mild to moderate decrease  G3b                   30-44                Moderate to severe decrease  G4                    15-29                Severe decrease  G5                    14 or less           Kidney failure    (1)In the absence of evidence of kidney disease, neither GFR category G1 or G2 fulfill the criteria for CKD.    eGFR calculation 2021 CKD-EPI creatinine equation, which does not include race as a factor    CBC Auto Differential [[id number]]  (Abnormal) Collected: 06/06/25 1820    Specimen: Blood from Arm, Right Updated: 06/06/25 1828     WBC 7.26 10*3/mm3      RBC 3.34 10*6/mm3      Hemoglobin 10.3 g/dL      Hematocrit 34.9 %      .5 fL      MCH 30.8 pg      MCHC 29.5 g/dL      RDW 15.6 %      RDW-SD 59.0 fl      MPV 10.3 fL      Platelets 135 10*3/mm3      Neutrophil  % 76.0 %      Lymphocyte % 9.0 %      Monocyte % 10.9 %      Eosinophil % 3.4 %      Basophil % 0.4 %      Immature Grans % 0.3 %      Neutrophils, Absolute 5.52 10*3/mm3      Lymphocytes, Absolute 0.65 10*3/mm3      Monocytes, Absolute 0.79 10*3/mm3      Eosinophils, Absolute 0.25 10*3/mm3      Basophils, Absolute 0.03 10*3/mm3      Immature Grans, Absolute 0.02 10*3/mm3      nRBC 0.0 /100 WBC     Protime-INR [[id number]]  (Normal) Collected: 06/06/25 1820    Specimen: Blood from Arm, Right Updated: 06/06/25 1848     Protime 14.9 Seconds      INR 1.12    Narrative:      Suggested Therapeutic Ranges For Oral Anticoagulant Therapy:  Level of Therapy                      INR Target Range  Standard Dose                            2.0-3.0  High Dose                                2.5-3.5  Patients not receiving anticoagulant  Therapy Normal Range                     0.86-1.15    aPTT [[id number]]  (Abnormal) Collected: 06/06/25 1820    Specimen: Blood from Arm, Right Updated: 06/06/25 1848     PTT 35.2 seconds              Imaging:    XR Chest 1 View  Result Date: 6/6/2025  XR CHEST 1 VW Date of Exam: 6/6/2025 6:33 PM EDT Indication: recent fall, preop Comparison: CT chest from May 6, 2025 Findings: A right internal jugular catheter has its tip at the cavoatrial junction. There is mediastinal shift to the left. There is stable scarring within the right midlung. The left hemithorax is difficult to characterize due to the mediastinal shift although there may be a small left pleural effusion with some left sided airspace disease. The heart and mediastinal contours are likely stable. The pulmonary vascular appears normal. The osseous structures appear intact. The patient is slightly rotated.     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 2.Possible small left pleural effusion with left-sided airspace disease, which could reflect atelectasis or pneumonia. Electronically  Signed: Leonardo Herbert MD  6/6/2025 7:15 PM EDT  Workstation ID: [id number]    CT Head Without Contrast  Result Date: 6/6/2025  CT HEAD WO CONTRAST, CT CERVICAL SPINE WO CONTRAST Date of Exam: 6/6/2025 6:27 PM EDT Indication: head trauma. Comparison: None available. Technique: Axial CT images were obtained of the head and cervical spine without contrast administration.  Reconstructed coronal and sagittal images were also obtained. Automated exposure control and iterative construction methods were used. Findings: Head: There is a contusion of the left parietal scalp. No acute calvarial fracture. No acute intracranial hemorrhage. No acute large territory infarct. There are scattered and patchy areas of subcortical and periventricular white matter hypodensity which  is nonspecific and can be seen in the setting of chronic small vessel ischemic change. No extra-axial collections. No midline shift or herniation. Normal size and configuration of the ventricles. Normal appearance of the orbits. The paranasal sinuses are clear. There is intracranial atherosclerosis. Bilateral mastoid air cell effusions are noted. No acute or suspicious bony findings. Cervical spine: The paravertebral soft tissues are normal. No acute fracture. No loss of vertebral body height. No traumatic subluxation. There is mild to moderate degenerative disc disease in the lower cervical spine. There is bilateral multilevel facet  arthropathy worst in the mid and upper cervical spine. There is a right IJ central venous catheter. There is emphysema. There is heavy atherosclerosis of the thoracic aorta. There are pleural plaques in the left lung. Scarring in the mid right lung. Treatment changes in the left apex.     Impression: Left parietal scalp contusion. No acute intracranial findings. Chronic and senescent changes as above. No acute fracture or traumatic malalignment in the cervical spine. Electronically Signed: Elliott Centeno MD  6/6/2025 7:01 PM  EDT  Workstation ID: [id number]    CT Cervical Spine Without Contrast  Result Date: 6/6/2025  CT HEAD WO CONTRAST, CT CERVICAL SPINE WO CONTRAST Date of Exam: 6/6/2025 6:27 PM EDT Indication: head trauma. Comparison: None available. Technique: Axial CT images were obtained of the head and cervical spine without contrast administration.  Reconstructed coronal and sagittal images were also obtained. Automated exposure control and iterative construction methods were used. Findings: Head: There is a contusion of the left parietal scalp. No acute calvarial fracture. No acute intracranial hemorrhage. No acute large territory infarct. There are scattered and patchy areas of subcortical and periventricular white matter hypodensity which  is nonspecific and can be seen in the setting of chronic small vessel ischemic change. No extra-axial collections. No midline shift or herniation. Normal size and configuration of the ventricles. Normal appearance of the orbits. The paranasal sinuses are clear. There is intracranial atherosclerosis. Bilateral mastoid air cell effusions are noted. No acute or suspicious bony findings. Cervical spine: The paravertebral soft tissues are normal. No acute fracture. No loss of vertebral body height. No traumatic subluxation. There is mild to moderate degenerative disc disease in the lower cervical spine. There is bilateral multilevel facet  arthropathy worst in the mid and upper cervical spine. There is a right IJ central venous catheter. There is emphysema. There is heavy atherosclerosis of the thoracic aorta. There are pleural plaques in the left lung. Scarring in the mid right lung. Treatment changes in the left apex.     Impression: Left parietal scalp contusion. No acute intracranial findings. Chronic and senescent changes as above. No acute fracture or traumatic malalignment in the cervical spine. Electronically Signed: Elliott Centeno MD  6/6/2025 7:01 PM EDT  Workstation ID: [id number]    XR  Hip With or Without Pelvis 2 - 3 View Left  Result Date: 6/6/2025  XR HIP W OR WO PELVIS 2-3 VIEW LEFT Date of Exam: 6/6/2025 6:11 PM EDT Indication: fall Comparison: None available. Findings: The bones are demineralized. No acute fracture or traumatic malalignment of the left hip or within the pelvis. Bilateral iliac vascular stents are noted.     Impression: Bony demineralization without acute displaced fracture. Electronically Signed: Elliott Centeno MD  6/6/2025 6:53 PM EDT  Workstation ID: [id number]    XR Femur 2 View Left  Result Date: 6/6/2025  XR FEMUR 2 VW LEFT Date of Exam: 6/6/2025 6:10 PM EDT Indication: fall Comparison: None available. Findings: There is an acute comminuted fracture of the distal femoral metadiaphysis with fracture fragment overlap/foreshortening, medial displacement of the distal fragment, and apex anterior angulation of approximately 30 degrees. The total knee arthroplasty appears normally aligned. Nondisplaced fracture lines likely extend down to the femoral component of the hardware. There is prepatellar soft tissue swelling. The bones are demineralized.     Impression: Acute periprosthetic fracture of the distal femoral metadiaphysis with suspected nondisplaced fracture lines extending to the femoral component of the total knee arthroplasty. Electronically Signed: Elliott Centeno MD  6/6/2025 6:42 PM EDT  Workstation ID: [id number]        Differential Diagnosis and Discussion:    Orthopedic Injuries: Differential diagnosis includes but is not limited to fractures, soft tissue injuries, dislocations, contusions, ligamentous injuries, tendon injuries, nerve injuries, compartment syndrome, bursitis, and vascular injuries.    PROCEDURES:    Labs were collected in the emergency department and all labs were reviewed and interpreted by me.  X-ray were performed in the emergency department and all X-ray impressions were independently interpreted by me.  An EKG was performed and the EKG was  interpreted by supervising attending.  CT scan was performed in the emergency department and the CT scan radiology impression was interpreted by me.    ECG 12 Lead Pre-Op / Pre-Procedure   Preliminary Result   HEART RATE=86  bpm   RR Kyzjywxa=501  ms   HI Ajvyqcgr=648  ms   P Horizontal Axis=53  deg   P Front Axis=108  deg   QRSD Interval=86  ms   QT Rrfeyjzk=438  ms   VHrE=879  ms   QRS Axis=81  deg   T Wave Axis=83  deg   - ABNORMAL ECG -   Sinus rhythm   Borderline right axis deviation   Consider left ventricular hypertrophy   Anterior Q waves, possibly due to LVH   Date and Time of Study:2025-06-06 20:27:29          Procedures    MDM     Amount and/or Complexity of Data Reviewed  Clinical lab tests: reviewed and ordered  Tests in the radiology section of CPT®: reviewed and ordered    Risk of Complications, Morbidity, and/or Mortality  Presenting problems: moderate  Diagnostic procedures: moderate  Management options: moderate    Patient Progress  Patient progress: stable    Patient presents to the emergency department for evaluation of fall. States that she was trying to put on a shirt and got turned around. She fell on her left side and hit her left forehead. She has been unable to get up. She is complaining of left hip and knee pain.  Medical history includes lung cancer, diabetes, COPD, hypertension, CAD. 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 She is on blood thinners.    On exam  Left Leg: TTP to the left hip and left distal femur. Neurovasc intact. DP and PT 2+.   No tenderness and Full ROM to all major joints including the ankle, knees, shoulders, elbows, and wrists.   No focal tenderness to the cervical, thoracic, and lumbar spine.    The patient´s CBC that was reviewed and interpreted by me shows no abnormalities of critical concern. Of note, there is no anemia requiring a blood transfusion and the platelet count is acceptable.     The patient´s CMP that was reviewed and interpreted by  me shows no abnormalities of critical concern. Of note, the patient´s sodium and potassium are acceptable. The patient´s liver enzymes are unremarkable. The patient has a normal anion gap.     Creatinine elevated 1.91. This seems to be baseline.    XR Chest 1 View   Final Result   Impression:   1.Mediastinal shift to the left, which could be due to volume loss from prior lobectomy.   2.Possible small left pleural effusion with left-sided airspace disease, which could reflect atelectasis or pneumonia.            Electronically Signed: Leonardo Herbert MD     6/6/2025 7:15 PM EDT     Workstation ID: [id number]      CT Head Without Contrast   Final Result   Impression:   Left parietal scalp contusion. No acute intracranial findings.         Chronic and senescent changes as above.         No acute fracture or traumatic malalignment in the cervical spine.            Electronically Signed: Elliott Centeno MD     6/6/2025 7:01 PM EDT     Workstation ID: [id number]      CT Cervical Spine Without Contrast   Final Result   Impression:   Left parietal scalp contusion. No acute intracranial findings.         Chronic and senescent changes as above.         No acute fracture or traumatic malalignment in the cervical spine.            Electronically Signed: Elliott Centeno MD     6/6/2025 7:01 PM EDT     Workstation ID: [id number]      XR Hip With or Without Pelvis 2 - 3 View Left   Final Result   Impression:   Bony demineralization without acute displaced fracture.         Electronically Signed: Elliott Centeno MD     6/6/2025 6:53 PM EDT     Workstation ID: [id number]      XR Femur 2 View Left   Final Result   Impression:   Acute periprosthetic fracture of the distal femoral metadiaphysis with suspected nondisplaced fracture lines extending to the femoral component of the total knee arthroplasty.         Electronically Signed: Elliott Centeno MD     6/6/2025 6:42 PM EDT     Workstation ID: [id number]        Discussed the imaging findings  with the patient.    Discussed this patient with Dr. Hilton who recommends consulting ortho. Most likely will need to transfer.    Discussed this patient with aristides Cobian on-call, who recommends transferring to [facility].    Consulted aristides Schmitt [facility], who reviewed the imaging and would like the patient transferred to the ED.    Discussed this patient with Dr. Clark, [facility] ED Attending, who accepts patient for transfer.    Patient requires transfer to an outside facility. Lab and radiology findings were discussed at length with accepting physician and consultants. Patient was placed on the cardiac monitor and was repeatedly assessed.  They were monitored for ventricular ectopy, arrhythmia, tachycardia, hypoxia, changes in blood pressure, and declining mental status several times throughout their stay in the emergency department.    Total Critical Care time of 31 minutes. Total critical care time documented does not include time spent on separately billed procedures for services of nurses or physician assistants. I personally saw and examined the patient. I have reviewed all diagnostic interpretations and treatment plans as written. I was present for the key portions of any procedures performed and the inclusive time noted in any critical care statement. Critical care time includes patient management by me, time spent at the patients bedside,  time to review lab and imaging results, discussing patient care, documentation in the medical record, and time spent with family or caregiver.          Patient Care Considerations:    CONSULT: I considered consulting Neurosurgery, however there are no acute intra-cranial findings.      Consultants/Shared Management Plan:    See note above.    Social Determinants of Health:    Patient has presented with family members who are responsible, reliable and will ensure follow up care.      Disposition and Care Coordination:    Transferred: Through independent evaluation of  the patient's history, physical, and imperical data, the patient meets criteria to be transferred to another hospital for evaluation/admission.        Final diagnoses:   Closed displaced fracture of distal epiphysis of left femur, initial encounter   Traumatic injury of head, initial encounter        ED Disposition       ED Disposition   Transfer to Another Facility     Condition   --    Comment   --               This medical record created using voice recognition software.             Dylan Patino PA  06/06/25 2217       Dylan Patino PA  06/06/25 2212

## 2025-06-09 RX ORDER — FUROSEMIDE 20 MG/1
20 TABLET ORAL DAILY
Qty: 90 TABLET | Refills: 3 | Status: SHIPPED | OUTPATIENT
Start: 2025-06-09

## 2025-06-09 NOTE — TELEPHONE ENCOUNTER
Pharmacy sent refill request on Lasix 20 mg daily.  Chart review completed, medication sent for refill.

## 2025-06-13 RX ORDER — AMLODIPINE BESYLATE 5 MG/1
5 TABLET ORAL DAILY
Qty: 30 TABLET | Refills: 2 | Status: SHIPPED | OUTPATIENT
Start: 2025-06-13 | End: 2025-06-13 | Stop reason: SDUPTHER

## 2025-06-13 RX ORDER — AMLODIPINE BESYLATE 5 MG/1
5 TABLET ORAL DAILY
Qty: 30 TABLET | Refills: 2 | Status: SHIPPED | OUTPATIENT
Start: 2025-06-13

## 2025-06-16 NOTE — PROGRESS NOTES
Primary Care Provider  Brennan Mosqueda MD   Referring Provider  No ref. provider found    Patient Complaint  Follow-up and Shortness of Breath (All the time)    Patient or patient representative verbalized consent for the use of Ambient Listening during the visit with  DAT Valdes for chart documentation. 6/17/2025  11:34 EDT      Subjective       History of Presenting Illness  Charlette Rai is a pleasant 89 y.o. female  of  Dr. Yanez who presents to [facility] with history of gastroesophageal reflux disease, end-stage renal disease on dialysis three days a week, coronary artery disease, peripheral vascular disease, hypertension, heart failure with preserved ejection fraction, renal artery stenosis, hypothyroidism, and lung cancer, and COPD on 3- 3.5 L oxygen via nasal cannula  here for followup appointment. Her current treatment regimen includes Pulmicort, DuoNeb, and albuterol. She administers DuoNeb every 6 hours as needed and Pulmicort twice daily. She also takes Moy nebulizer following a cycle of 30 days on and 30 days off for recurrent Pseudomonas.     Lung cancer history: first diagnosed 2005 NSCLC with right upper lobectomy 2005 2/6/2025 left lower lobe squamous cell carcinoma    1/31/2025 right lower lobe squamous cell carcinoma IR guided biopsy     She has been diagnosed 1/2025 with squamous cell carcinoma in the right lower lobe, confirmed through a CT-guided biopsy. She had previously undergone a resection of the right upper lobe and is currently under the care of Dr. Medrano     She sees cardiology for heart failure with preserved ejection fraction, hypertension and hyperlipidemia.     Her most recent CT of chest was 5/6/2025 by oncology which revealed previous left upper lobectomy, 2.0 x 1.0 cm pleural-based density in the right lower lobe post laterally consistent with treated neoplasm.The long diameter of the lesion has  decreased 0.3 cm in the interval consistent with stable disease by RECIST criteria.  Areas of suspected scarring/atelectasis in the mid right lung and lower left lung.  History of Present Illness  The patient is an 89-year-old female who presents for a follow-up appointment.    She reports persistent respiratory distress, necessitating the use of 3 liters of oxygen, with occasional increases as required. She has been advised to discontinue tobramycin for a period of 30 days. She is currently on Pulmicort and DuoNeb. She reports no fevers or chills. She has not observed any wheezing or hemoptysis. She is seeking a refill for her albuterol rescue inhaler. Her breathing treatments at the nursing home are administered every 4 hours as needed, typically 3 to 4 times daily. However, she has been experiencing increased difficulty in breathing due to inconsistent administration of these treatments. She is supposed to get her medicine before supper overnight, but she does not get that. She has been receiving her breathing treatment once or twice daily, and up to three times upon request. She has completed her course of doxycycline.    She experienced a fall on 06/03/2025, resulting in a fracture of her left leg. She is currently undergoing rehabilitation at [facility]. She reports more pain in her right leg than the left one. Her mobility is significantly reduced, with minimal walking ability. She has been referred to a vascular specialist.    She is scheduled to see Dr. Medrano next month. She is also scheduled to see Dr. Mosqueda on 07/15/2025, Dr. Sanchez in 10/2025, and Dr. Yanez in 10/2025.    SOCIAL HISTORY  Exercise: Very little walking       At present time patient denies coughing, wheezing, headaches, chest pain, weight loss or hemoptysis. Patient denies fevers, chills and night sweats. Charlette Rai is able to perform ADLs with assistance.      I have personally reviewed the review of systems, past family,  social, medical and surgical histories; and agree with their findings.      Review of Systems   Constitutional: Negative.    HENT: Negative.     Respiratory:  Positive for shortness of breath.    Cardiovascular: Negative.    Musculoskeletal: Negative.    Neurological: Negative.    Psychiatric/Behavioral: Negative.           Family History   Problem Relation Age of Onset    Arthritis Mother     Arthritis Father     Cancer Brother     Diabetes Brother     Other Brother         blood disease     Prostate cancer Brother     Cancer Brother     Prostate cancer Brother     Cancer Brother     Cancer Brother     Malig Hyperthermia Neg Hx     Colon cancer Neg Hx         Social History     Socioeconomic History    Marital status:    Tobacco Use    Smoking status: Former     Current packs/day: 0.00     Average packs/day: 1 pack/day for 52.5 years (52.5 ttl pk-yrs)     Types: Cigarettes     Start date:      Quit date: 2004     Years since quittin.9     Passive exposure: Past    Smokeless tobacco: Never   Vaping Use    Vaping status: Never Used   Substance and Sexual Activity    Alcohol use: Not Currently     Comment: beer many years ago    Drug use: Never    Sexual activity: Defer        Past Medical History:   Diagnosis Date    Allergic rhinitis     Anaphylactic shock, unspecified, initial encounter 2023    Anemia     Arthritis     Asthma     USE INHALERS AND NEBULIZERS    Back pain     Bladder disorder     Cancer     LEFT LUNG CANCER  SURGERY AND CHEMO DONE  AND CURRENTLY   RIGHT LUNG CANCER HAS ONLY RECEIVED RADIATION THUS FAR    Chronic kidney disease     stage 3    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 DENIES CP BUT DOES HAVE SOA CHRONIC ISSUE COPD/LUNG CANCER    COPD (chronic obstructive pulmonary disease)      FOLLOWED BY DR MARIAJOSE BAILEY    Coronary artery disease     DENIES CP BUT DOES GET SOA MOST OF THE TIME WITH EXERTION BUT OCC AT REST CHRONIC ISSUE COPD/LUNG CANCER    Deep vein thrombosis     Diabetes mellitus     DOES NOT CHECK BS DAILY    Disease of thyroid gland     HYPOTHYROIDISM    Essential hypertension     Gastric ulcer     GERD (gastroesophageal reflux disease)     Heart murmur     History of transfusion     NO ISSUES POST TRANSFUSION WAS MANY YEARS AGO    Hyperlipemia     Leukocytopenia     FOLLOWED BY DR MIR BAILEY    Limb swelling     Lumbago     Lumbar spinal stenosis     Lung cancer     Migraine headache     Multiple joint pain     On home oxygen therapy     4L/NC PRN    Osteopenia     PNA (pneumonia) 05/04/2024    Pseudomonas pneumonia 04/29/2024    Reflux esophagitis     Shortness of breath     Thyroid nodule     HAS ULTRASOUND YEARLY BEING MONITORED    Vascular disease     Vitamin D deficiency         Immunization History   Administered Date(s) Administered    ABRYSVO (RSV, 60+ or pregnant women 32-36 wks) 09/19/2024    COVID-19 (PFIZER) 12YRS+ (COMIRNATY) 10/19/2023, 10/10/2024    COVID-19 (PFIZER) BIVALENT 12+YRS 10/13/2022    COVID-19 (PFIZER) Purple Cap Monovalent 03/07/2021, 04/04/2021, 10/31/2021    FLUAD TRI 65YR+ 10/10/2024    Fluad Quad 65+ 10/19/2023    Fluzone (or Fluarix & Flulaval for VFC) >6mos 09/21/2016    Fluzone High-Dose 65+YRS 10/09/2018, 10/15/2020, 10/12/2021, 09/19/2022    Hepatitis B 2 Dose Vaccine Heplisav-B 08/28/2024, 09/23/2024, 10/23/2024, 12/23/2024, 03/26/2025, 04/30/2025, 05/28/2025    Influenza Seasonal Injectable 10/01/2017, 10/01/2018    Influenza, Unspecified 10/13/2021    Pneumococcal Conjugate 13-Valent (PCV13) 10/21/2015    Pneumococcal Conjugate 20-Valent (PCV20) 08/30/2024    Pneumococcal Polysaccharide (PPSV23) 10/30/2003, 11/08/2016       No Known Allergies       Current Outpatient Medications:     acetaminophen (TYLENOL) 325 MG tablet, Take 2 tablets by mouth  Every 6 (Six) Hours As Needed for Mild Pain., Disp: , Rfl:     albuterol sulfate  (90 Base) MCG/ACT inhaler, Inhale 2 puffs Every 4 (Four) Hours As Needed for Wheezing., Disp: 18 g, Rfl: 11    amLODIPine (NORVASC) 5 MG tablet, Take 1 tablet by mouth Daily., Disp: 30 tablet, Rfl: 2    aspirin 81 MG EC tablet, Take 1 tablet by mouth Daily., Disp: , Rfl:     budesonide (Pulmicort) 0.5 MG/2ML nebulizer solution, Take 2 mL by nebulization 2 (Two) Times a Day., Disp: 60 each, Rfl: 5    carvedilol (COREG) 25 MG tablet, Take 1 tablet by mouth 2 (Two) Times a Day With Meals., Disp: 180 tablet, Rfl: 3    clopidogrel (Plavix) 75 MG tablet, Take 1 tablet by mouth Daily., Disp: 90 tablet, Rfl: 3    dexlansoprazole (DEXILANT) 60 MG capsule, Take 1 capsule by mouth Daily., Disp: 90 capsule, Rfl: 3    ferrous gluconate (FERGON) 324 MG tablet, Take 1 tablet by mouth 3 (Three) Times a Week., Disp: 60 tablet, Rfl: 1    fluticasone (FLONASE) 50 MCG/ACT nasal spray, Administer 1 spray into the nostril(s) as directed by provider Daily., Disp: , Rfl:     furosemide (LASIX) 20 MG tablet, Take 1 tablet by mouth Daily., Disp: 90 tablet, Rfl: 3    ipratropium-albuterol (DUO-NEB) 0.5-2.5 mg/3 ml nebulizer, Take 3 mL by nebulization Every 6 (Six) Hours As Needed for Wheezing., Disp: 360 mL, Rfl: 2    isosorbide mononitrate (IMDUR) 30 MG 24 hr tablet, Take 1 tablet by mouth Every Night., Disp: 90 tablet, Rfl: 3    levocetirizine (XYZAL) 5 MG tablet, TAKE 1 TABLET DAILY, Disp: 90 tablet, Rfl: 1    levothyroxine (SYNTHROID, LEVOTHROID) 50 MCG tablet, Take 1 tablet by mouth Daily., Disp: , Rfl:     lidocaine-prilocaine (EMLA) 2.5-2.5 % cream, Apply 1 Application topically to the appropriate area as directed Take As Directed. Apply to dialysis access site 30-45 minutes prior to dialysis, Disp: , Rfl:     losartan (Cozaar) 50 MG tablet, Take 1 tablet by mouth 2 (Two) Times a Day., Disp: 60 tablet, Rfl: 5    Methoxy PEG-Epoetin Beta (MIRCERA  "IJ), 200 mcg Every 14 (Fourteen) Days., Disp: , Rfl:     Psyllium ([employer] DAILY FIBER PO), Take 2 tablets by mouth Daily., Disp: , Rfl:     rosuvastatin (CRESTOR) 20 MG tablet, Take 1 tablet by mouth Every Night., Disp: 90 tablet, Rfl: 3    sevelamer (RENVELA) 800 MG tablet, Take 1 tablet by mouth 3 (Three) Times a Day With Meals., Disp: 270 tablet, Rfl: 3    tobramycin PF (JUAN) 300 MG/5ML nebulizer solution, Take 5 mL by nebulization 2 (Two) Times a Day. 30 days on 30 days off, Disp: 300 mL, Rfl: 6    vitamin D (ERGOCALCIFEROL) 1.25 MG (34491 UT) capsule capsule, Take 1 capsule by mouth Every 14 (Fourteen) Days. (Patient taking differently: Take 1 capsule by mouth Every 14 (Fourteen) Days. The 15th and 30th of the month), Disp: 6 capsule, Rfl: 3    losartan (Cozaar) 25 MG tablet, Take 1 tablet by mouth Every Night. Half tab for one week then 1 tab if SBP is greater than 140 (Patient not taking: Reported on 6/17/2025), Disp: 90 tablet, Rfl: 0         Vital Signs   /65 (BP Location: Right arm, Patient Position: Sitting, Cuff Size: Adult)   Pulse 72   Temp 98.2 °F (36.8 °C)   Resp 18   Ht 165.1 cm (65\")   Wt 66.7 kg (147 lb)   SpO2 99% Comment: 3 liters  BMI 24.46 kg/m²       Objective     Physical Exam  Vitals reviewed.   Constitutional:       General: She is not in acute distress.     Appearance: Normal appearance. She is not ill-appearing.   HENT:      Head: Normocephalic and atraumatic.      Nose: Nose normal.      Mouth/Throat:      Mouth: Mucous membranes are moist.      Pharynx: Oropharynx is clear.   Eyes:      Extraocular Movements: Extraocular movements intact.      Conjunctiva/sclera: Conjunctivae normal.      Pupils: Pupils are equal, round, and reactive to light.   Cardiovascular:      Rate and Rhythm: Normal rate and regular rhythm.      Pulses: Normal pulses.      Heart sounds: Normal heart sounds.   Pulmonary:      Effort: Pulmonary effort is normal. No respiratory distress.      Breath " sounds: Normal breath sounds. No stridor. No wheezing, rhonchi or rales.   Abdominal:      General: Bowel sounds are normal.   Musculoskeletal:      Cervical back: Normal range of motion and neck supple.   Skin:     General: Skin is warm and dry.   Neurological:      Mental Status: She is alert and oriented to person, place, and time.   Psychiatric:         Behavior: Behavior normal.         Physical Exam  Lungs: Clear to auscultation bilaterally, no wheezing noted.  Skin: Bruise noted on face.         Results Review  I have personally reviewed the prior office notes, hospital records, labs, and diagnostics.  CT Chest Without Contrast Diagnostic [[id number]] (Order [id number])  Order  Status: Final result     Study Notes     Ken Idalmis on 5/6/2025  1:17 PM EDT   lung cancer s/p radiation to RLL, Malignant neoplasm of lower lobe, right bronchus or lung    Hx of lung cancer  Open heart surgery  Pt c/o chest tightness  DLP 88  CTDI 2.47  PC     Appointment Information    PACS Images     Radiology Images  Study Result    Narrative & Impression   CT CHEST WO CONTRAST DIAGNOSTIC     Date of Exam: 5/6/2025 1:10 PM EDT     Indication: lung cancer s/p radiation to RLL.     Comparison: PET/CT dated 2/12/2025     Technique: Axial CT images were obtained of the chest without contrast administration.  Reconstructed coronal and sagittal images were also obtained. Automated exposure control and iterative construction methods were used.        Findings:  A left upper lobectomy has been performed. There is cardiac mediastinal shift toward the left. Dense left pleural calcifications are noted likely related to previous pleural infection or hemorrhage. No pleural or pericardial effusion is seen. Severe   coronary artery atherosclerotic calcification is noted. Severe atherosclerotic calcification of the thoracic aorta is noted. Fluid is noted in the esophageal lumen likely representing reflux. No adenopathy is seen.     Again seen in the  right perihilar region is bandlike airspace opacity which is stable in the interval and suspected to represent atelectasis, fibrosis and/or posttreatment changes. Along the posterolateral aspect of the right lower lobe on image 81 is a   pleural-based density measuring approximately 2.3 cm x 1.0 cm. This corresponds to biopsy proven squamous cell carcinoma. The lesion appears smaller compared to the previous PET/CT at which time it measured 2.6 cm x 2.5 cm. Pleural and/or chest wall   invasion is not excluded. Irregular density in the left lung base likely represents scarring or atelectasis.     Moderate to severe atherosclerotic calcification is noted in the abdominal aorta. The upper abdomen otherwise appears unremarkable. No focal osseous lesion is seen.           IMPRESSION:  Impression:  Previous left upper lobectomy.  2.3 cm x 1.0 cm pleural-based density in the right lower lobe posterolaterally consistent with treated neoplasm. The long diameter of the lesion has decreased 0.3 cm in the interval consistent with stable disease by RECIST criteria.  Areas of suspected scarring/atelectasis in the mid right lung and lower left lung.              Electronically Signed: Nilay Harry MD    5/8/2025 5:47 PM EDT    Workstation ID: [id number]     Results  Imaging   - Chest x-ray: Shows prior lobectomy and a little bit of atelectasis.   - X-ray of the left hip: Did not show any fracture.          Assessment         Patient Active Problem List   Diagnosis    Malignant neoplasm of upper lobe of right lung    Rheumatoid arthritis    Asthma    Chronic heart failure with preserved ejection fraction    Essential hypertension    GERD (gastroesophageal reflux disease)    Hyperlipidemia LDL goal <70    Lumbar spinal stenosis    Migraine    Monoclonal paraproteinemia    Osteopenia    Oxygen dependent    Peripheral neuropathy    Stage 4 chronic kidney disease    Vitamin D deficiency    Carotid artery stenosis    Chronic right-sided  thoracic back pain    Peripheral vascular disease of lower extremity    Ex-smoker    De Quervain's tenosynovitis    Arthritis of carpometacarpal (CMC) joint of right thumb    Steal syndrome of dialysis vascular access    Anemia of chronic renal failure, stage 4 (severe)    Aortic stenosis, moderate    Hematoma    End stage renal disease on dialysis    Coronary artery calcification seen on CT scan    Frailty syndrome in geriatric patient    COPD with lower respiratory infection    Coagulation defect, unspecified    Hyperphosphatemia    Hypothyroidism (acquired)    Idiopathic osteoarthritis    Secondary multiple arthritis    Type 2 diabetes mellitus with diabetic peripheral angiopathy without gangrene    Renal artery stenosis    S/P renal artery angioplasty    Atherosclerosis of renal artery    Abnormal serum immunoelectrophoresis    NSVT (nonsustained ventricular tachycardia)    Hypertensive heart and chronic kidney disease with heart failure and with stage 5 chronic kidney disease, or end stage renal disease    Unspecified protein-calorie malnutrition    IFG (impaired fasting glucose)    Iron deficiency anemia    AV graft malfunction, initial encounter    AV graft malfunction    Weakness    Malignant neoplasm of lower lobe of left lung    Malignant neoplasm of lower lobe, right bronchus or lung        Plan     Diagnoses and all orders for this visit:    1. Squamous cell carcinoma lung, right (Primary)    2. ESRD (end stage renal disease) on dialysis    3. Chronic respiratory failure with hypoxia    4. Chronic obstructive pulmonary disease, unspecified COPD type    5. Oxygen dependent         Assessment & Plan  1. 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  Her recent CT scan did not reveal any significant findings. The last chest x-ray conducted during her hospital stay indicated a previous lobectomy and minor atelectasis. She is advised to resume tobramycin in 30 days. She is instructed to continue using Pulmicort twice  daily and DuoNeb every 6 hours. A call will be made to the nursing home to ensure she receives her nebulizer treatment three times daily and Pulmicort twice daily.  Spoke with Yadi, nurse for patient at the facility who verified that the patient is getting her Pulmicort twice daily and her DuoNeb every 6 hours.    2. Left leg fracture.  She sustained a left leg fracture from a fall on 06/03/2025. An x-ray of the left hip did not show any fracture. She is currently in rehab at [facility]. If the pain persists, an x-ray can be considered.      Smoking status:  reports that she quit smoking about 20 years ago. Her smoking use included cigarettes. She started smoking about 73 years ago. She has a 52.5 pack-year smoking history. She has been exposed to tobacco smoke. She has never used smokeless tobacco.    Vaccination status: Reviewed  Immunization History   Administered Date(s) Administered    ABRYSVO (RSV, 60+ or pregnant women 32-36 wks) 09/19/2024    COVID-19 (PFIZER) 12YRS+ (COMIRNATY) 10/19/2023, 10/10/2024    COVID-19 (PFIZER) BIVALENT 12+YRS 10/13/2022    COVID-19 (PFIZER) Purple Cap Monovalent 03/07/2021, 04/04/2021, 10/31/2021    FLUAD TRI 65YR+ 10/10/2024    Fluad Quad 65+ 10/19/2023    Fluzone (or Fluarix & Flulaval for VFC) >6mos 09/21/2016    Fluzone High-Dose 65+YRS 10/09/2018, 10/15/2020, 10/12/2021, 09/19/2022    Hepatitis B 2 Dose Vaccine Heplisav-B 08/28/2024, 09/23/2024, 10/23/2024, 12/23/2024, 03/26/2025, 04/30/2025, 05/28/2025    Influenza Seasonal Injectable 10/01/2017, 10/01/2018    Influenza, Unspecified 10/13/2021    Pneumococcal Conjugate 13-Valent (PCV13) 10/21/2015    Pneumococcal Conjugate 20-Valent (PCV20) 08/30/2024    Pneumococcal Polysaccharide (PPSV23) 10/30/2003, 11/08/2016        Medications personally reviewed    Follow Up  Return for Next scheduled follow up.    Patient was given instructions and counseling regarding her condition or for health maintenance advice. Please see specific  information pulled into the AVS if appropriate.     I spent 16 minutes caring for Charlette Rai on this date of service. This time includes time spent by me in the following activities:preparing for the visit, reviewing tests, obtaining and/or reviewing a separately obtained history, performing a medically appropriate examination and/or evaluation, counseling and educating the patient/family/caregiver, ordering medications, tests, or procedures, documenting information in the medical record, independently interpreting results and communicating that information with the patient/family/caregiver and answered questions family members, discuss medications.

## 2025-06-17 ENCOUNTER — OFFICE VISIT (OUTPATIENT)
Dept: PULMONOLOGY | Facility: CLINIC | Age: 89
End: 2025-06-17
Payer: MEDICARE

## 2025-06-17 VITALS
BODY MASS INDEX: 24.49 KG/M2 | WEIGHT: 147 LBS | SYSTOLIC BLOOD PRESSURE: 136 MMHG | TEMPERATURE: 98.2 F | HEIGHT: 65 IN | DIASTOLIC BLOOD PRESSURE: 65 MMHG | OXYGEN SATURATION: 99 % | HEART RATE: 72 BPM | RESPIRATION RATE: 18 BRPM

## 2025-06-17 DIAGNOSIS — Z99.81 OXYGEN DEPENDENT: ICD-10-CM

## 2025-06-17 DIAGNOSIS — N18.6 ESRD (END STAGE RENAL DISEASE) ON DIALYSIS: ICD-10-CM

## 2025-06-17 DIAGNOSIS — C34.91 SQUAMOUS CELL CARCINOMA LUNG, RIGHT: Primary | ICD-10-CM

## 2025-06-17 DIAGNOSIS — J96.11 CHRONIC RESPIRATORY FAILURE WITH HYPOXIA: ICD-10-CM

## 2025-06-17 DIAGNOSIS — J44.9 CHRONIC OBSTRUCTIVE PULMONARY DISEASE, UNSPECIFIED COPD TYPE: ICD-10-CM

## 2025-06-17 DIAGNOSIS — Z99.2 ESRD (END STAGE RENAL DISEASE) ON DIALYSIS: ICD-10-CM

## 2025-06-17 PROCEDURE — 99214 OFFICE O/P EST MOD 30 MIN: CPT | Performed by: NURSE PRACTITIONER

## 2025-06-17 PROCEDURE — 1160F RVW MEDS BY RX/DR IN RCRD: CPT | Performed by: NURSE PRACTITIONER

## 2025-06-17 PROCEDURE — 1159F MED LIST DOCD IN RCRD: CPT | Performed by: NURSE PRACTITIONER

## 2025-06-24 ENCOUNTER — APPOINTMENT (OUTPATIENT)
Dept: GENERAL RADIOLOGY | Facility: HOSPITAL | Age: 89
End: 2025-06-24
Payer: MEDICARE

## 2025-06-24 ENCOUNTER — HOSPITAL ENCOUNTER (INPATIENT)
Facility: HOSPITAL | Age: 89
LOS: 9 days | Discharge: NURSING, REHAB OR LONG-TERM CARE | End: 2025-07-03
Attending: EMERGENCY MEDICINE | Admitting: STUDENT IN AN ORGANIZED HEALTH CARE EDUCATION/TRAINING PROGRAM
Payer: MEDICARE

## 2025-06-24 ENCOUNTER — APPOINTMENT (OUTPATIENT)
Dept: CT IMAGING | Facility: HOSPITAL | Age: 89
End: 2025-06-24
Payer: MEDICARE

## 2025-06-24 DIAGNOSIS — R26.2 DIFFICULTY WALKING: ICD-10-CM

## 2025-06-24 DIAGNOSIS — Z78.9 DECREASED ACTIVITIES OF DAILY LIVING (ADL): ICD-10-CM

## 2025-06-24 DIAGNOSIS — E78.5 HYPERLIPIDEMIA LDL GOAL <70: ICD-10-CM

## 2025-06-24 DIAGNOSIS — K92.2 GASTROINTESTINAL HEMORRHAGE, UNSPECIFIED GASTROINTESTINAL HEMORRHAGE TYPE: ICD-10-CM

## 2025-06-24 DIAGNOSIS — R53.1 WEAKNESS: Primary | ICD-10-CM

## 2025-06-24 LAB
ABO GROUP BLD: NORMAL
ALBUMIN SERPL-MCNC: 3.3 G/DL (ref 3.5–5.2)
ALBUMIN/GLOB SERPL: 1.3 G/DL
ALP SERPL-CCNC: 308 U/L (ref 39–117)
ALT SERPL W P-5'-P-CCNC: 21 U/L (ref 1–33)
ANION GAP SERPL CALCULATED.3IONS-SCNC: 11.5 MMOL/L (ref 5–15)
AST SERPL-CCNC: 45 U/L (ref 1–32)
BASOPHILS # BLD AUTO: 0.03 10*3/MM3 (ref 0–0.2)
BASOPHILS NFR BLD AUTO: 0.2 % (ref 0–1.5)
BILIRUB SERPL-MCNC: 0.5 MG/DL (ref 0–1.2)
BLD GP AB SCN SERPL QL: NEGATIVE
BUN SERPL-MCNC: 33 MG/DL (ref 8–23)
BUN/CREAT SERPL: 12.5 (ref 7–25)
CALCIUM SPEC-SCNC: 8.2 MG/DL (ref 8.6–10.5)
CHLORIDE SERPL-SCNC: 92 MMOL/L (ref 98–107)
CO2 SERPL-SCNC: 29.5 MMOL/L (ref 22–29)
CREAT SERPL-MCNC: 2.65 MG/DL (ref 0.57–1)
D-LACTATE SERPL-SCNC: 1.1 MMOL/L (ref 0.5–2)
DEPRECATED RDW RBC AUTO: 64.3 FL (ref 37–54)
EGFRCR SERPLBLD CKD-EPI 2021: 16.9 ML/MIN/1.73
EOSINOPHIL # BLD AUTO: 0.04 10*3/MM3 (ref 0–0.4)
EOSINOPHIL NFR BLD AUTO: 0.3 % (ref 0.3–6.2)
ERYTHROCYTE [DISTWIDTH] IN BLOOD BY AUTOMATED COUNT: 17.6 % (ref 12.3–15.4)
GLOBULIN UR ELPH-MCNC: 2.5 GM/DL
GLUCOSE SERPL-MCNC: 121 MG/DL (ref 65–99)
HCT VFR BLD AUTO: 22.8 % (ref 34–46.6)
HGB BLD-MCNC: 7 G/DL (ref 12–15.9)
HOLD SPECIMEN: NORMAL
HOLD SPECIMEN: NORMAL
IMM GRANULOCYTES # BLD AUTO: 0.08 10*3/MM3 (ref 0–0.05)
IMM GRANULOCYTES NFR BLD AUTO: 0.6 % (ref 0–0.5)
LIPASE SERPL-CCNC: 22 U/L (ref 13–60)
LYMPHOCYTES # BLD AUTO: 0.33 10*3/MM3 (ref 0.7–3.1)
LYMPHOCYTES NFR BLD AUTO: 2.3 % (ref 19.6–45.3)
MCH RBC QN AUTO: 31.1 PG (ref 26.6–33)
MCHC RBC AUTO-ENTMCNC: 30.7 G/DL (ref 31.5–35.7)
MCV RBC AUTO: 101.3 FL (ref 79–97)
MONOCYTES # BLD AUTO: 0.68 10*3/MM3 (ref 0.1–0.9)
MONOCYTES NFR BLD AUTO: 4.7 % (ref 5–12)
NEUTROPHILS NFR BLD AUTO: 13.2 10*3/MM3 (ref 1.7–7)
NEUTROPHILS NFR BLD AUTO: 91.9 % (ref 42.7–76)
NRBC BLD AUTO-RTO: 0 /100 WBC (ref 0–0.2)
PLATELET # BLD AUTO: 196 10*3/MM3 (ref 140–450)
PMV BLD AUTO: 9.8 FL (ref 6–12)
POTASSIUM SERPL-SCNC: 4.3 MMOL/L (ref 3.5–5.2)
PROT SERPL-MCNC: 5.8 G/DL (ref 6–8.5)
RBC # BLD AUTO: 2.25 10*6/MM3 (ref 3.77–5.28)
RH BLD: POSITIVE
SODIUM SERPL-SCNC: 133 MMOL/L (ref 136–145)
T&S EXPIRATION DATE: NORMAL
WBC NRBC COR # BLD AUTO: 14.36 10*3/MM3 (ref 3.4–10.8)
WHOLE BLOOD HOLD COAG: NORMAL
WHOLE BLOOD HOLD SPECIMEN: NORMAL

## 2025-06-24 PROCEDURE — 85025 COMPLETE CBC W/AUTO DIFF WBC: CPT | Performed by: EMERGENCY MEDICINE

## 2025-06-24 PROCEDURE — 83690 ASSAY OF LIPASE: CPT | Performed by: EMERGENCY MEDICINE

## 2025-06-24 PROCEDURE — 99291 CRITICAL CARE FIRST HOUR: CPT

## 2025-06-24 PROCEDURE — 71045 X-RAY EXAM CHEST 1 VIEW: CPT

## 2025-06-24 PROCEDURE — 86923 COMPATIBILITY TEST ELECTRIC: CPT

## 2025-06-24 PROCEDURE — 25010000002 ONDANSETRON PER 1 MG: Performed by: EMERGENCY MEDICINE

## 2025-06-24 PROCEDURE — 74019 RADEX ABDOMEN 2 VIEWS: CPT

## 2025-06-24 PROCEDURE — 86901 BLOOD TYPING SEROLOGIC RH(D): CPT | Performed by: EMERGENCY MEDICINE

## 2025-06-24 PROCEDURE — 25810000003 SODIUM CHLORIDE 0.9 % SOLUTION: Performed by: EMERGENCY MEDICINE

## 2025-06-24 PROCEDURE — 83605 ASSAY OF LACTIC ACID: CPT | Performed by: EMERGENCY MEDICINE

## 2025-06-24 PROCEDURE — 86900 BLOOD TYPING SEROLOGIC ABO: CPT | Performed by: EMERGENCY MEDICINE

## 2025-06-24 PROCEDURE — 80053 COMPREHEN METABOLIC PANEL: CPT | Performed by: EMERGENCY MEDICINE

## 2025-06-24 PROCEDURE — 74176 CT ABD & PELVIS W/O CONTRAST: CPT

## 2025-06-24 PROCEDURE — 25010000002 FAMOTIDINE 10 MG/ML SOLUTION: Performed by: EMERGENCY MEDICINE

## 2025-06-24 PROCEDURE — 36415 COLL VENOUS BLD VENIPUNCTURE: CPT

## 2025-06-24 PROCEDURE — 86850 RBC ANTIBODY SCREEN: CPT | Performed by: EMERGENCY MEDICINE

## 2025-06-24 PROCEDURE — 99223 1ST HOSP IP/OBS HIGH 75: CPT | Performed by: STUDENT IN AN ORGANIZED HEALTH CARE EDUCATION/TRAINING PROGRAM

## 2025-06-24 RX ORDER — ACETAMINOPHEN 325 MG/1
650 TABLET ORAL EVERY 6 HOURS PRN
Status: DISCONTINUED | OUTPATIENT
Start: 2025-06-24 | End: 2025-06-29

## 2025-06-24 RX ORDER — SODIUM CHLORIDE 0.9 % (FLUSH) 0.9 %
10 SYRINGE (ML) INJECTION EVERY 12 HOURS SCHEDULED
Status: DISCONTINUED | OUTPATIENT
Start: 2025-06-25 | End: 2025-07-03 | Stop reason: HOSPADM

## 2025-06-24 RX ORDER — FAMOTIDINE 10 MG/ML
20 INJECTION, SOLUTION INTRAVENOUS ONCE
Status: COMPLETED | OUTPATIENT
Start: 2025-06-24 | End: 2025-06-24

## 2025-06-24 RX ORDER — SODIUM CHLORIDE 0.9 % (FLUSH) 0.9 %
10 SYRINGE (ML) INJECTION AS NEEDED
Status: DISCONTINUED | OUTPATIENT
Start: 2025-06-24 | End: 2025-06-25

## 2025-06-24 RX ORDER — HYDRALAZINE HYDROCHLORIDE 20 MG/ML
10 INJECTION INTRAMUSCULAR; INTRAVENOUS EVERY 6 HOURS PRN
Status: DISCONTINUED | OUTPATIENT
Start: 2025-06-24 | End: 2025-06-29

## 2025-06-24 RX ORDER — SODIUM CHLORIDE 0.9 % (FLUSH) 0.9 %
10 SYRINGE (ML) INJECTION AS NEEDED
Status: DISCONTINUED | OUTPATIENT
Start: 2025-06-24 | End: 2025-07-03 | Stop reason: HOSPADM

## 2025-06-24 RX ORDER — SODIUM CHLORIDE 9 MG/ML
40 INJECTION, SOLUTION INTRAVENOUS AS NEEDED
Status: DISCONTINUED | OUTPATIENT
Start: 2025-06-24 | End: 2025-07-03 | Stop reason: HOSPADM

## 2025-06-24 RX ORDER — PANTOPRAZOLE SODIUM 40 MG/10ML
40 INJECTION, POWDER, LYOPHILIZED, FOR SOLUTION INTRAVENOUS
Status: DISCONTINUED | OUTPATIENT
Start: 2025-06-25 | End: 2025-06-29

## 2025-06-24 RX ORDER — PANTOPRAZOLE SODIUM 40 MG/10ML
40 INJECTION, POWDER, LYOPHILIZED, FOR SOLUTION INTRAVENOUS ONCE
Status: COMPLETED | OUTPATIENT
Start: 2025-06-24 | End: 2025-06-24

## 2025-06-24 RX ORDER — NITROGLYCERIN 0.4 MG/1
0.4 TABLET SUBLINGUAL
Status: DISCONTINUED | OUTPATIENT
Start: 2025-06-24 | End: 2025-06-29

## 2025-06-24 RX ORDER — ONDANSETRON 2 MG/ML
4 INJECTION INTRAMUSCULAR; INTRAVENOUS EVERY 6 HOURS PRN
Status: DISCONTINUED | OUTPATIENT
Start: 2025-06-24 | End: 2025-07-03 | Stop reason: HOSPADM

## 2025-06-24 RX ORDER — ONDANSETRON 2 MG/ML
4 INJECTION INTRAMUSCULAR; INTRAVENOUS ONCE
Status: COMPLETED | OUTPATIENT
Start: 2025-06-24 | End: 2025-06-24

## 2025-06-24 RX ADMIN — PANTOPRAZOLE SODIUM 40 MG: 40 INJECTION, POWDER, FOR SOLUTION INTRAVENOUS at 20:55

## 2025-06-24 RX ADMIN — SODIUM CHLORIDE 1000 ML: 9 INJECTION, SOLUTION INTRAVENOUS at 18:44

## 2025-06-24 RX ADMIN — FAMOTIDINE 20 MG: 10 INJECTION INTRAVENOUS at 18:44

## 2025-06-24 RX ADMIN — ONDANSETRON 4 MG: 2 INJECTION INTRAMUSCULAR; INTRAVENOUS at 18:44

## 2025-06-25 ENCOUNTER — ANESTHESIA (OUTPATIENT)
Dept: GASTROENTEROLOGY | Facility: HOSPITAL | Age: 89
End: 2025-06-25
Payer: MEDICARE

## 2025-06-25 ENCOUNTER — ANESTHESIA EVENT (OUTPATIENT)
Dept: GASTROENTEROLOGY | Facility: HOSPITAL | Age: 89
End: 2025-06-25
Payer: MEDICARE

## 2025-06-25 PROBLEM — E43 SEVERE PROTEIN-CALORIE MALNUTRITION: Status: ACTIVE | Noted: 2025-06-25

## 2025-06-25 LAB
ALBUMIN SERPL-MCNC: 3 G/DL (ref 3.5–5.2)
ALBUMIN/GLOB SERPL: 1 G/DL
ALP SERPL-CCNC: 296 U/L (ref 39–117)
ALT SERPL W P-5'-P-CCNC: 19 U/L (ref 1–33)
ANION GAP SERPL CALCULATED.3IONS-SCNC: 13.4 MMOL/L (ref 5–15)
APTT PPP: 41.6 SECONDS (ref 24.2–34.2)
AST SERPL-CCNC: 40 U/L (ref 1–32)
BACTERIA UR QL AUTO: ABNORMAL /HPF
BASOPHILS # BLD AUTO: 0.02 10*3/MM3 (ref 0–0.2)
BASOPHILS NFR BLD AUTO: 0.1 % (ref 0–1.5)
BILIRUB SERPL-MCNC: 0.5 MG/DL (ref 0–1.2)
BILIRUB UR QL STRIP: NEGATIVE
BUN SERPL-MCNC: 37.2 MG/DL (ref 8–23)
BUN/CREAT SERPL: 13.1 (ref 7–25)
CALCIUM SPEC-SCNC: 8.5 MG/DL (ref 8.6–10.5)
CHLORIDE SERPL-SCNC: 93 MMOL/L (ref 98–107)
CLARITY UR: ABNORMAL
CO2 SERPL-SCNC: 27.6 MMOL/L (ref 22–29)
COLOR UR: YELLOW
CREAT SERPL-MCNC: 2.84 MG/DL (ref 0.57–1)
DEPRECATED RDW RBC AUTO: 66.4 FL (ref 37–54)
EGFRCR SERPLBLD CKD-EPI 2021: 15.5 ML/MIN/1.73
EOSINOPHIL # BLD AUTO: 0.02 10*3/MM3 (ref 0–0.4)
EOSINOPHIL NFR BLD AUTO: 0.1 % (ref 0.3–6.2)
ERYTHROCYTE [DISTWIDTH] IN BLOOD BY AUTOMATED COUNT: 17.8 % (ref 12.3–15.4)
GLOBULIN UR ELPH-MCNC: 2.9 GM/DL
GLUCOSE SERPL-MCNC: 101 MG/DL (ref 65–99)
GLUCOSE UR STRIP-MCNC: NEGATIVE MG/DL
HCT VFR BLD AUTO: 23 % (ref 34–46.6)
HCT VFR BLD AUTO: 23.3 % (ref 34–46.6)
HCT VFR BLD AUTO: 26.7 % (ref 34–46.6)
HGB BLD-MCNC: 7.1 G/DL (ref 12–15.9)
HGB BLD-MCNC: 7.1 G/DL (ref 12–15.9)
HGB BLD-MCNC: 8.4 G/DL (ref 12–15.9)
HGB UR QL STRIP.AUTO: ABNORMAL
HOLD SPECIMEN: NORMAL
HOLD SPECIMEN: NORMAL
HYALINE CASTS UR QL AUTO: ABNORMAL /LPF
IMM GRANULOCYTES # BLD AUTO: 0.1 10*3/MM3 (ref 0–0.05)
IMM GRANULOCYTES NFR BLD AUTO: 0.5 % (ref 0–0.5)
INR PPP: 1.12 (ref 0.86–1.15)
KETONES UR QL STRIP: NEGATIVE
LEUKOCYTE ESTERASE UR QL STRIP.AUTO: ABNORMAL
LYMPHOCYTES # BLD AUTO: 0.32 10*3/MM3 (ref 0.7–3.1)
LYMPHOCYTES NFR BLD AUTO: 1.7 % (ref 19.6–45.3)
MCH RBC QN AUTO: 31.6 PG (ref 26.6–33)
MCHC RBC AUTO-ENTMCNC: 30.9 G/DL (ref 31.5–35.7)
MCV RBC AUTO: 102.2 FL (ref 79–97)
MONOCYTES # BLD AUTO: 0.82 10*3/MM3 (ref 0.1–0.9)
MONOCYTES NFR BLD AUTO: 4.3 % (ref 5–12)
NEUTROPHILS NFR BLD AUTO: 17.7 10*3/MM3 (ref 1.7–7)
NEUTROPHILS NFR BLD AUTO: 93.3 % (ref 42.7–76)
NITRITE UR QL STRIP: NEGATIVE
NRBC BLD AUTO-RTO: 0 /100 WBC (ref 0–0.2)
PH UR STRIP.AUTO: 6 [PH] (ref 5–8)
PLATELET # BLD AUTO: 214 10*3/MM3 (ref 140–450)
PMV BLD AUTO: 9.6 FL (ref 6–12)
POTASSIUM SERPL-SCNC: 4.1 MMOL/L (ref 3.5–5.2)
PROT SERPL-MCNC: 5.9 G/DL (ref 6–8.5)
PROT UR QL STRIP: ABNORMAL
PROTHROMBIN TIME: 14.9 SECONDS (ref 11.8–14.9)
RBC # BLD AUTO: 2.25 10*6/MM3 (ref 3.77–5.28)
RBC # UR STRIP: ABNORMAL /HPF
REF LAB TEST METHOD: ABNORMAL
SODIUM SERPL-SCNC: 134 MMOL/L (ref 136–145)
SP GR UR STRIP: 1.01 (ref 1–1.03)
SQUAMOUS #/AREA URNS HPF: ABNORMAL /HPF
UROBILINOGEN UR QL STRIP: ABNORMAL
WBC # UR STRIP: ABNORMAL /HPF
WBC NRBC COR # BLD AUTO: 18.98 10*3/MM3 (ref 3.4–10.8)

## 2025-06-25 PROCEDURE — 25010000002 CEFTRIAXONE PER 250 MG: Performed by: INTERNAL MEDICINE

## 2025-06-25 PROCEDURE — 81001 URINALYSIS AUTO W/SCOPE: CPT | Performed by: EMERGENCY MEDICINE

## 2025-06-25 PROCEDURE — 94640 AIRWAY INHALATION TREATMENT: CPT

## 2025-06-25 PROCEDURE — 25810000003 LACTATED RINGERS PER 1000 ML: Performed by: NURSE ANESTHETIST, CERTIFIED REGISTERED

## 2025-06-25 PROCEDURE — 99222 1ST HOSP IP/OBS MODERATE 55: CPT | Performed by: INTERNAL MEDICINE

## 2025-06-25 PROCEDURE — 43235 EGD DIAGNOSTIC BRUSH WASH: CPT | Performed by: INTERNAL MEDICINE

## 2025-06-25 PROCEDURE — 85014 HEMATOCRIT: CPT | Performed by: STUDENT IN AN ORGANIZED HEALTH CARE EDUCATION/TRAINING PROGRAM

## 2025-06-25 PROCEDURE — 85018 HEMOGLOBIN: CPT

## 2025-06-25 PROCEDURE — 85025 COMPLETE CBC W/AUTO DIFF WBC: CPT | Performed by: STUDENT IN AN ORGANIZED HEALTH CARE EDUCATION/TRAINING PROGRAM

## 2025-06-25 PROCEDURE — 85014 HEMATOCRIT: CPT

## 2025-06-25 PROCEDURE — 94799 UNLISTED PULMONARY SVC/PX: CPT

## 2025-06-25 PROCEDURE — P9016 RBC LEUKOCYTES REDUCED: HCPCS

## 2025-06-25 PROCEDURE — 86900 BLOOD TYPING SEROLOGIC ABO: CPT

## 2025-06-25 PROCEDURE — 85018 HEMOGLOBIN: CPT | Performed by: STUDENT IN AN ORGANIZED HEALTH CARE EDUCATION/TRAINING PROGRAM

## 2025-06-25 PROCEDURE — 0DJ08ZZ INSPECTION OF UPPER INTESTINAL TRACT, VIA NATURAL OR ARTIFICIAL OPENING ENDOSCOPIC: ICD-10-PCS | Performed by: INTERNAL MEDICINE

## 2025-06-25 PROCEDURE — 80053 COMPREHEN METABOLIC PANEL: CPT | Performed by: STUDENT IN AN ORGANIZED HEALTH CARE EDUCATION/TRAINING PROGRAM

## 2025-06-25 PROCEDURE — 99233 SBSQ HOSP IP/OBS HIGH 50: CPT | Performed by: INTERNAL MEDICINE

## 2025-06-25 PROCEDURE — 25010000002 PROPOFOL 10 MG/ML EMULSION: Performed by: NURSE ANESTHETIST, CERTIFIED REGISTERED

## 2025-06-25 PROCEDURE — 36430 TRANSFUSION BLD/BLD COMPNT: CPT

## 2025-06-25 PROCEDURE — 25010000002 LIDOCAINE PF 2% 2 % SOLUTION: Performed by: NURSE ANESTHETIST, CERTIFIED REGISTERED

## 2025-06-25 PROCEDURE — 36415 COLL VENOUS BLD VENIPUNCTURE: CPT | Performed by: STUDENT IN AN ORGANIZED HEALTH CARE EDUCATION/TRAINING PROGRAM

## 2025-06-25 PROCEDURE — 85610 PROTHROMBIN TIME: CPT | Performed by: STUDENT IN AN ORGANIZED HEALTH CARE EDUCATION/TRAINING PROGRAM

## 2025-06-25 PROCEDURE — 94761 N-INVAS EAR/PLS OXIMETRY MLT: CPT

## 2025-06-25 PROCEDURE — 85730 THROMBOPLASTIN TIME PARTIAL: CPT | Performed by: STUDENT IN AN ORGANIZED HEALTH CARE EDUCATION/TRAINING PROGRAM

## 2025-06-25 RX ORDER — CLOPIDOGREL BISULFATE 75 MG/1
75 TABLET ORAL DAILY
Status: DISCONTINUED | OUTPATIENT
Start: 2025-06-25 | End: 2025-07-03 | Stop reason: HOSPADM

## 2025-06-25 RX ORDER — AMLODIPINE BESYLATE 5 MG/1
5 TABLET ORAL DAILY
Status: DISCONTINUED | OUTPATIENT
Start: 2025-06-25 | End: 2025-07-02

## 2025-06-25 RX ORDER — SODIUM CHLORIDE, SODIUM LACTATE, POTASSIUM CHLORIDE, CALCIUM CHLORIDE 600; 310; 30; 20 MG/100ML; MG/100ML; MG/100ML; MG/100ML
INJECTION, SOLUTION INTRAVENOUS CONTINUOUS PRN
Status: DISCONTINUED | OUTPATIENT
Start: 2025-06-25 | End: 2025-06-25 | Stop reason: SURG

## 2025-06-25 RX ORDER — ASPIRIN 81 MG/1
81 TABLET ORAL DAILY
Status: DISCONTINUED | OUTPATIENT
Start: 2025-06-25 | End: 2025-07-03 | Stop reason: HOSPADM

## 2025-06-25 RX ORDER — LEVOTHYROXINE SODIUM 50 UG/1
50 TABLET ORAL DAILY
Status: DISCONTINUED | OUTPATIENT
Start: 2025-06-25 | End: 2025-06-25

## 2025-06-25 RX ORDER — LOSARTAN POTASSIUM 50 MG/1
50 TABLET ORAL 2 TIMES DAILY
Status: DISCONTINUED | OUTPATIENT
Start: 2025-06-25 | End: 2025-07-03

## 2025-06-25 RX ORDER — BUDESONIDE 0.5 MG/2ML
0.5 INHALANT ORAL
Status: DISCONTINUED | OUTPATIENT
Start: 2025-06-25 | End: 2025-07-03 | Stop reason: HOSPADM

## 2025-06-25 RX ORDER — ISOSORBIDE MONONITRATE 30 MG/1
30 TABLET, EXTENDED RELEASE ORAL NIGHTLY
Status: DISCONTINUED | OUTPATIENT
Start: 2025-06-25 | End: 2025-07-03 | Stop reason: HOSPADM

## 2025-06-25 RX ORDER — LEVOTHYROXINE SODIUM 50 UG/1
50 TABLET ORAL
Status: DISCONTINUED | OUTPATIENT
Start: 2025-06-25 | End: 2025-07-03 | Stop reason: HOSPADM

## 2025-06-25 RX ORDER — PROPOFOL 10 MG/ML
VIAL (ML) INTRAVENOUS AS NEEDED
Status: DISCONTINUED | OUTPATIENT
Start: 2025-06-25 | End: 2025-06-25 | Stop reason: SURG

## 2025-06-25 RX ORDER — SEVELAMER CARBONATE 800 MG/1
800 TABLET, FILM COATED ORAL
Status: DISCONTINUED | OUTPATIENT
Start: 2025-06-25 | End: 2025-07-03 | Stop reason: HOSPADM

## 2025-06-25 RX ORDER — CARVEDILOL 25 MG/1
25 TABLET ORAL 2 TIMES DAILY WITH MEALS
Status: DISCONTINUED | OUTPATIENT
Start: 2025-06-25 | End: 2025-06-29

## 2025-06-25 RX ORDER — LOSARTAN POTASSIUM 50 MG/1
25 TABLET ORAL NIGHTLY
Status: DISCONTINUED | OUTPATIENT
Start: 2025-06-25 | End: 2025-06-25

## 2025-06-25 RX ORDER — ROSUVASTATIN CALCIUM 20 MG/1
20 TABLET, COATED ORAL NIGHTLY
Status: DISCONTINUED | OUTPATIENT
Start: 2025-06-25 | End: 2025-06-27

## 2025-06-25 RX ORDER — IPRATROPIUM BROMIDE AND ALBUTEROL SULFATE 2.5; .5 MG/3ML; MG/3ML
3 SOLUTION RESPIRATORY (INHALATION) EVERY 6 HOURS PRN
Status: DISCONTINUED | OUTPATIENT
Start: 2025-06-25 | End: 2025-07-03 | Stop reason: HOSPADM

## 2025-06-25 RX ORDER — LIDOCAINE HYDROCHLORIDE 20 MG/ML
INJECTION, SOLUTION EPIDURAL; INFILTRATION; INTRACAUDAL; PERINEURAL AS NEEDED
Status: DISCONTINUED | OUTPATIENT
Start: 2025-06-25 | End: 2025-06-25 | Stop reason: SURG

## 2025-06-25 RX ADMIN — LIDOCAINE HYDROCHLORIDE 60 MG: 20 INJECTION, SOLUTION INTRAVENOUS at 11:43

## 2025-06-25 RX ADMIN — CEFTRIAXONE SODIUM 1000 MG: 1 INJECTION, POWDER, FOR SOLUTION INTRAMUSCULAR; INTRAVENOUS at 14:01

## 2025-06-25 RX ADMIN — MUPIROCIN 1 APPLICATION: 20 OINTMENT TOPICAL at 21:25

## 2025-06-25 RX ADMIN — Medication 10 ML: at 08:49

## 2025-06-25 RX ADMIN — BUDESONIDE 0.5 MG: 0.5 SUSPENSION RESPIRATORY (INHALATION) at 19:56

## 2025-06-25 RX ADMIN — LOSARTAN POTASSIUM 50 MG: 50 TABLET, FILM COATED ORAL at 21:25

## 2025-06-25 RX ADMIN — ROSUVASTATIN CALCIUM 20 MG: 20 TABLET, FILM COATED ORAL at 21:25

## 2025-06-25 RX ADMIN — PROPOFOL 50 MG: 10 INJECTION, EMULSION INTRAVENOUS at 11:48

## 2025-06-25 RX ADMIN — PANTOPRAZOLE SODIUM 40 MG: 40 INJECTION, POWDER, FOR SOLUTION INTRAVENOUS at 08:49

## 2025-06-25 RX ADMIN — SODIUM CHLORIDE, POTASSIUM CHLORIDE, SODIUM LACTATE AND CALCIUM CHLORIDE: 600; 310; 30; 20 INJECTION, SOLUTION INTRAVENOUS at 11:43

## 2025-06-25 RX ADMIN — BUDESONIDE 0.5 MG: 0.5 SUSPENSION RESPIRATORY (INHALATION) at 07:25

## 2025-06-25 RX ADMIN — PROPOFOL 50 MG: 10 INJECTION, EMULSION INTRAVENOUS at 11:51

## 2025-06-25 RX ADMIN — PANTOPRAZOLE SODIUM 40 MG: 40 INJECTION, POWDER, FOR SOLUTION INTRAVENOUS at 17:44

## 2025-06-25 RX ADMIN — ISOSORBIDE MONONITRATE 30 MG: 30 TABLET, EXTENDED RELEASE ORAL at 21:25

## 2025-06-25 RX ADMIN — Medication 10 ML: at 21:25

## 2025-06-25 NOTE — PERIOPERATIVE NURSING NOTE
Pre-op RN is unable to review medication or history with patient due to her confusion. RN will attempt to contact daughter to discuss education and answer any questions.

## 2025-06-25 NOTE — H&P
[facility] ERIST HISTORY AND PHYSICAL  Date: 2025   Patient Name: Charlette Rai  : 1935  MRN: [id number]  Primary Care Physician:  Brennan Mosqueda MD  Date of admission: 2025    Subjective   Subjective     Chief Complaint: gi bleed    HPI:    Charlette Rai is a 89 y.o. female past medical history of CHF, lung cancer, diabetes, hypothyroid and hypertension presents to the ED due to GI bleed and weakness.  Patient states he has been having  black emesis.  In addition patient has not had a bowel movement for the past couple of days.  Patiently complaining less of nausea.  Denies fevers, chest pain, palpitation, shortness of breath, headache or dysuria.  In the ED, patient's vitals showed temperature 98.9, heart rate 86 blood pressure 122/60.  Labs show sodium 133, creatinine 2.65, hemoglobin 7.0, white blood cell 14.3.  CT on pelvis shows large colonic stool concerning for constipation, send urinary bladder.  GI consulted and will evaluate in the morning.  Patient admitted to floors for further management.    Personal History     Past Medical History:  Past Medical History:   Diagnosis Date    Allergic rhinitis     Anaphylactic shock, unspecified, initial encounter 2023    Anemia     Arthritis     Asthma     USE INHALERS AND NEBULIZERS    Back pain     Bladder disorder     Cancer     LEFT LUNG CANCER  SURGERY AND CHEMO DONE  AND CURRENTLY   RIGHT LUNG CANCER HAS ONLY RECEIVED RADIATION THUS FAR    Chronic kidney disease     stage 3    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 DENIES CP BUT DOES HAVE SOA CHRONIC ISSUE COPD/LUNG CANCER    COPD (chronic obstructive pulmonary disease)     FOLLOWED BY DR MARIAJOSE BAILEY    Coronary artery disease     DENIES CP BUT DOES GET SOA MOST OF THE TIME WITH  EXERTION BUT OCC AT REST CHRONIC ISSUE COPD/LUNG CANCER    Deep vein thrombosis     Diabetes mellitus     DOES NOT CHECK BS DAILY    Disease of thyroid gland     HYPOTHYROIDISM    Essential hypertension     Gastric ulcer     GERD (gastroesophageal reflux disease)     Heart murmur     History of transfusion     NO ISSUES POST TRANSFUSION WAS MANY YEARS AGO    Hyperlipemia     Leukocytopenia     FOLLOWED BY DR MIR BAILEY    Limb swelling     Lumbago     Lumbar spinal stenosis     Lung cancer     Migraine headache     Multiple joint pain     On home oxygen therapy     4L/NC PRN    Osteopenia     PNA (pneumonia) 05/04/2024    Pseudomonas pneumonia 04/29/2024    Reflux esophagitis     Shortness of breath     Thyroid nodule     HAS ULTRASOUND YEARLY BEING MONITORED    Vascular disease     Vitamin D deficiency        Past Surgical History:  Past Surgical History:   Procedure Laterality Date    ABDOMINAL SURGERY      ANGIOPLASTY RENAL ARTERY Left 06/14/2024    stent placement    APPENDECTOMY      ARTERIOGRAM N/A 6/14/2024    Procedure: Arteriogram, right radial access, aortogram and renal arteriogram with possible intervention.;  Surgeon: Dio Miguel MD;  Location: [facility] CATH INVASIVE LOCATION;  Service: Peripheral Vascular;  Laterality: N/A;    ARTERIOVENOUS FISTULA/SHUNT SURGERY Left 05/10/2022    Procedure: Left basilic vein transposition;  Surgeon: Wan Barksdale MD;  Location: [facility] MAIN OR;  Service: Vascular;  Laterality: Left;    ARTERIOVENOUS FISTULA/SHUNT SURGERY Left 03/23/2023    Procedure: Ligation of left arm arteriovenous fistula;  Surgeon: Wan Barksdale MD;  Location: [facility] MAIN OR;  Service: Vascular;  Laterality: Left;    ARTERIOVENOUS FISTULA/SHUNT SURGERY Left 11/30/2023    Procedure: Creation of left arm arteriovenous graft;  Surgeon: Wan Barksdale MD;  Location: [facility] MAIN OR;  Service: Vascular;  Laterality: Left;    BRONCHOSCOPY N/A 04/24/2024    Procedure: BRONCHOSCOPY WITH BAL AND  WASHINGS;  Surgeon: Khalif Yanez MD;  Location: [facility] ENDOSCOPY;  Service: Pulmonary;  Laterality: N/A;  MUCUS PLUGGING    CARDIAC CATHETERIZATION      CARDIAC SURGERY      CARDIAC SURGERY      fluid drained from heart    CATARACT EXTRACTION, BILATERAL      COLONOSCOPY      ENDOSCOPY  2016    FEMORAL ARTERY STENT Bilateral     HYSTERECTOMY      LEG THROMBECTOMY/EMBOLECTOMY Left 2025    Procedure: LEFT LOWER EXTREMITY ANGIOGRAM;  Surgeon: Jaden Castañeda MD;  Location: [facility] HYBRID OR;  Service: Vascular;  Laterality: Left;    LUNG BIOPSY Left     lobectomy upper lung caner    LUNG VOLUME REDUCTION      OTHER SURGICAL HISTORY      artifical joints/limbs    REPLACEMENT TOTAL KNEE Left 2016    SHUNT O GRAM N/A 2025    Procedure: dialysis shuntogram;  Surgeon: Court Valente MD;  Location: [facility] CATH INVASIVE LOCATION;  Service: Peripheral Vascular;  Laterality: N/A;    UPPER GASTROINTESTINAL ENDOSCOPY         Family History:   Family History   Problem Relation Age of Onset    Arthritis Mother     Arthritis Father     Cancer Brother     Diabetes Brother     Other Brother         blood disease     Prostate cancer Brother     Cancer Brother     Prostate cancer Brother     Cancer Brother     Cancer Brother     Malig Hyperthermia Neg Hx     Colon cancer Neg Hx        Social History:   Social History     Socioeconomic History    Marital status:    Tobacco Use    Smoking status: Former     Current packs/day: 0.00     Average packs/day: 1 pack/day for 52.5 years (52.5 ttl pk-yrs)     Types: Cigarettes     Start date:      Quit date: 2004     Years since quittin.0     Passive exposure: Past    Smokeless tobacco: Never   Vaping Use    Vaping status: Never Used   Substance and Sexual Activity    Alcohol use: Not Currently     Comment: beer many years ago    Drug use: Never    Sexual activity: Defer       Home Medications:  Methoxy PEG-Epoetin Beta, Psyllium,  acetaminophen, albuterol sulfate HFA, amLODIPine, aspirin, budesonide, carvedilol, clopidogrel, dexlansoprazole, ferrous gluconate, fluticasone, furosemide, ipratropium-albuterol, isosorbide mononitrate, levocetirizine, levothyroxine, lidocaine-prilocaine, losartan, rosuvastatin, sevelamer, tobramycin PF, and vitamin D    Allergies:  No Known Allergies    Review of Systems   All systems were reviewed and negative except for: Above    Objective   Objective     Vitals:   Temp:  [98.9 °F (37.2 °C)] 98.9 °F (37.2 °C)  Heart Rate:  [82-89] 86  Resp:  [14-16] 16  BP: (108-161)/() 122/60  Flow (L/min) (Oxygen Therapy):  [3] 3    Physical Exam    Constitutional: Awake, alert,  ill-appearing   Eyes: Pupils equal, sclerae anicteric, no conjunctival injection   HENT: NCAT, mucous membranes moist   Neck: Supple, no thyromegaly, no lymphadenopathy, trachea midline   Respiratory: Clear to auscultation bilaterally, nonlabored respirations    Cardiovascular: RRR, noted murmurs, rubs, or gallops, palpable pedal pulses bilaterally   Gastrointestinal: Positive bowel sounds, soft, nontender, nondistended   Musculoskeletal: No bilateral ankle edema, no clubbing or cyanosis to extremities   Psychiatric: Appropriate affect, cooperative   Neurologic: Oriented x 3, strength symmetric in all extremities, Cranial Nerves grossly intact to confrontation, speech clear   Skin: No rashes     Result Review    Result Review:  I have personally reviewed the results from the time of this admission to 6/24/2025 23:40 EDT and agree with these findings:  [x]  Laboratory  []  Microbiology  [x]  Radiology  []  EKG/Telemetry   []  Cardiology/Vascular   []  Pathology  [x]  Old records  []  Other:      Assessment & Plan   Assessment / Plan     Assessment/Plan:     Assessment  Upper GI bleed  Acute blood loss anemia   end-stage renal disease Monday Wednesday Friday  Chronic diastolic heart failure  Constipation  Essential  hypertension  COPD  Hypothyroidism  End-stage renal disease  Squamous cell carcinoma in the lung    Plan  Admit to ICU  Telemetry  Monitor vitals  CBC BMP  Trend H&H  Chest x-ray reviewed  CT abdomen pelvis reviewed  Hold BP meds  1 unit PRBCs ordered  Protonix IV twice daily  GI consulted  Nephrology consult    VTE Prophylaxis:  Mechanical VTE prophylaxis orders are present.        CODE STATUS:    Code Status (Patient has no pulse and is not breathing): CPR (Attempt to Resuscitate)  Medical Interventions (Patient has pulse or is breathing): Limited Support  Medical Intervention Limits: No intubation (DNI)      Admission Status:  I believe this patient meets inpt status.    Electronically signed by Jared Rivera MD, 06/24/25, 11:40 PM EDT.

## 2025-06-25 NOTE — CONSULTS
Patient Name: Charlette Rai  YOB: 1935  MRN: [id number]  Admission date: 6/24/2025  Reason for Encounter: Malnutrition Assessment    [facility] Clinical Nutrition Assessment     Subjective    Subjective Information     Pt did not screen at risk but was identified with s/s of malnutrition during rounds. Noted pt to have muscle loss to upper body and significant acute wt loss. Admitted with coffee ground/black emesis, underwent EGD. EGD identified pt s/p Billroth I. Constipation present with dark liquid BM. ESRD on HD. Pt on clear liquid diet which provides very few kcals, additional restrictions not appropriate unless absolutely necessary. Will liberalize fat restriction. No phos labs ordered this admit, but nephro reports hyperphosphatemia managed with HD, recommend removing phos restriction while on clears but will defer to nephro.     Assessment    H&P and Current Problems      H&P  Past Medical History:   Diagnosis Date    Allergic rhinitis     Anaphylactic shock, unspecified, initial encounter 12/05/2023    Anemia     Arthritis     Asthma     USE INHALERS AND NEBULIZERS    Back pain     Bladder disorder     Cancer     LEFT LUNG CANCER 2005 SURGERY AND CHEMO DONE  AND CURRENTLY 4/ 14/22  RIGHT LUNG CANCER HAS ONLY RECEIVED RADIATION THUS FAR    Chronic kidney disease     stage 3    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 DENIES CP BUT DOES HAVE SOA CHRONIC ISSUE COPD/LUNG CANCER    COPD (chronic obstructive pulmonary disease)     FOLLOWED BY DR MARIAJOSE BAILEY    Coronary artery disease     DENIES CP BUT DOES GET SOA MOST OF THE TIME WITH EXERTION BUT OCC AT REST CHRONIC ISSUE COPD/LUNG CANCER    Deep vein thrombosis     Diabetes mellitus     DOES NOT CHECK BS DAILY    Disease of thyroid gland     HYPOTHYROIDISM    Essential  hypertension     Gastric ulcer     GERD (gastroesophageal reflux disease)     Heart murmur     History of transfusion     NO ISSUES POST TRANSFUSION WAS MANY YEARS AGO    Hyperlipemia     Leukocytopenia     FOLLOWED BY DR MIR BAILEY    Limb swelling     Lumbago     Lumbar spinal stenosis     Lung cancer     Migraine headache     Multiple joint pain     On home oxygen therapy     4L/NC PRN    Osteopenia     PNA (pneumonia) 05/04/2024    Pseudomonas pneumonia 04/29/2024    Reflux esophagitis     Shortness of breath     Thyroid nodule     HAS ULTRASOUND YEARLY BEING MONITORED    Vascular disease     Vitamin D deficiency       Past Surgical History:   Procedure Laterality Date    ABDOMINAL SURGERY      ANGIOPLASTY RENAL ARTERY Left 06/14/2024    stent placement    APPENDECTOMY      ARTERIOGRAM N/A 6/14/2024    Procedure: Arteriogram, right radial access, aortogram and renal arteriogram with possible intervention.;  Surgeon: Dio Miguel MD;  Location: [facility] CATH INVASIVE LOCATION;  Service: Peripheral Vascular;  Laterality: N/A;    ARTERIOVENOUS FISTULA/SHUNT SURGERY Left 05/10/2022    Procedure: Left basilic vein transposition;  Surgeon: Wan Barksdale MD;  Location: [facility] MAIN OR;  Service: Vascular;  Laterality: Left;    ARTERIOVENOUS FISTULA/SHUNT SURGERY Left 03/23/2023    Procedure: Ligation of left arm arteriovenous fistula;  Surgeon: Wan Barksdale MD;  Location: [facility] MAIN OR;  Service: Vascular;  Laterality: Left;    ARTERIOVENOUS FISTULA/SHUNT SURGERY Left 11/30/2023    Procedure: Creation of left arm arteriovenous graft;  Surgeon: Wan Barksdale MD;  Location: [facility] MAIN OR;  Service: Vascular;  Laterality: Left;    BRONCHOSCOPY N/A 04/24/2024    Procedure: BRONCHOSCOPY WITH BAL AND WASHINGS;  Surgeon: Khalif Yanez MD;  Location: [facility] ENDOSCOPY;  Service: Pulmonary;  Laterality: N/A;  MUCUS PLUGGING    CARDIAC CATHETERIZATION  1996    CARDIAC SURGERY      CARDIAC SURGERY      fluid drained from  "heart    CATARACT EXTRACTION, BILATERAL  2003    COLONOSCOPY  2014    ENDOSCOPY  2016    2019    FEMORAL ARTERY STENT Bilateral     HYSTERECTOMY      LEG THROMBECTOMY/EMBOLECTOMY Left 1/29/2025    Procedure: LEFT LOWER EXTREMITY ANGIOGRAM;  Surgeon: Jaden Castañeda MD;  Location: [facility] HYBRID OR;  Service: Vascular;  Laterality: Left;    LUNG BIOPSY Left 2005    lobectomy upper lung caner    LUNG VOLUME REDUCTION      OTHER SURGICAL HISTORY      artifical joints/limbs    REPLACEMENT TOTAL KNEE Left 2016    SHUNT O GRAM N/A 1/13/2025    Procedure: dialysis shuntogram;  Surgeon: Court Valente MD;  Location: [facility] CATH INVASIVE LOCATION;  Service: Peripheral Vascular;  Laterality: N/A;    UPPER GASTROINTESTINAL ENDOSCOPY        Current Problems   Admission Diagnosis:  GI bleed [K92.2]  Gastrointestinal hemorrhage, unspecified gastrointestinal hemorrhage type [K92.2]    Problem List:    GI bleed      Other Applicable Nutrition Information:   Billroth I     Anthropometrics      BMI, Height, Weight Estimated body mass index is 23.77 kg/m² as calculated from the following:    Height as of this encounter: 165.1 cm (65\").    Weight as of this encounter: 64.8 kg (142 lb 13.7 oz).    Weight Method: Bed scale       Trending Weight Changes weight loss of 5 lbs (3%) over 2 week(s)    Significant?  Yes       Weight History  Wt Readings from Last 20 Encounters:   06/25/25 0356 64.8 kg (142 lb 13.7 oz)   06/24/25 1804 62.8 kg (138 lb 7.2 oz)   06/17/25 1130 66.7 kg (147 lb)   06/06/25 1755 66.7 kg (147 lb 0.8 oz)   06/03/25 1410 66.7 kg (147 lb)   05/13/25 1308 64.8 kg (142 lb 13.7 oz)   04/22/25 1309 65.2 kg (143 lb 12.8 oz)   04/22/25 1412 64.9 kg (143 lb)   04/01/25 0954 64 kg (141 lb 3.2 oz)   03/25/25 1111 61.6 kg (135 lb 12.8 oz)   03/18/25 1152 61.7 kg (136 lb)   03/03/25 1458 63 kg (138 lb 14.2 oz)   02/28/25 1515 62.1 kg (136 lb 12.7 oz)   02/26/25 1448 66 kg (145 lb 9.8 oz)   02/25/25 1236 63.5 kg (140 lb)   02/17/25 " 1448 64.8 kg (142 lb 13.7 oz)   02/06/25 1112 64.8 kg (142 lb 13.7 oz)   02/04/25 1013 64.4 kg (142 lb)   02/04/25 1040 64.4 kg (142 lb)   01/27/25 0727 64.5 kg (142 lb 3.2 oz)   01/26/25 2147 66.6 kg (146 lb 13.2 oz)   01/13/25 1350 65.2 kg (143 lb 11.8 oz)        Labs      Comment: Reviewed.      Results from last 7 days   Lab Units 06/25/25  0118 06/24/25  1842   SODIUM mmol/L 134* 133*   POTASSIUM mmol/L 4.1 4.3   GLUCOSE mg/dL 101* 121*   BUN mg/dL 37.2* 33.0*   CREATININE mg/dL 2.84* 2.65*   CALCIUM mg/dL 8.5* 8.2*   ALBUMIN g/dL 3.0* 3.3*   LACTATE mmol/L  --  1.1   BILIRUBIN mg/dL 0.5 0.5   ALK PHOS U/L 296* 308*   AST (SGOT) U/L 40* 45*   ALT (SGPT) U/L 19 21     Results from last 7 days   Lab Units 06/25/25  0636 06/25/25  0117 06/24/25  1842   PLATELETS 10*3/mm3  --  214 196   HEMOGLOBIN g/dL 8.4* 7.1*  7.1* 7.0*   HEMATOCRIT % 26.7* 23.0*  23.3* 22.8*     Lab Results   Component Value Date    HGBA1C 4.9 04/16/2025          Medications       Scheduled Medications [Held by provider] amLODIPine, 5 mg, Oral, Daily  [Held by provider] aspirin, 81 mg, Oral, Daily  budesonide, 0.5 mg, Nebulization, BID - RT  [Held by provider] carvedilol, 25 mg, Oral, BID With Meals  cefTRIAXone, 1,000 mg, Intravenous, Q24H  [Held by provider] clopidogrel, 75 mg, Oral, Daily  [START ON 6/26/2025] epoetin mita/mita-epbx, 10,000 Units, Intravenous, Once per day on Monday Wednesday Friday  [Held by provider] isosorbide mononitrate, 30 mg, Oral, Nightly  levothyroxine, 50 mcg, Oral, QAM AC  [Held by provider] losartan, 25 mg, Oral, Nightly  [Held by provider] losartan, 50 mg, Oral, BID  pantoprazole, 40 mg, Intravenous, BID AC  rosuvastatin, 20 mg, Oral, Nightly  [Held by provider] sevelamer, 800 mg, Oral, TID With Meals  sodium chloride, 10 mL, Intravenous, Q12H        Infusions      PRN Medications   acetaminophen    hydrALAZINE    ipratropium-albuterol    nitroglycerin    ondansetron    sodium chloride    sodium chloride     sodium chloride     Physical Findings      Chewing/Swallowing  Teeth Status: Mouth/Teeth WDL: .WDL except, teeth Teeth Symptoms: tooth/teeth missing  Chewing/Swallowing Issues: No issues identified at this time   Edema                            Bowel Function  Stool Output  Stool Consistency: liquid (06/25/25 1000)  Perineal Care: absorbent brief/pad changed, perineum cleansed (06/25/25 1000)  Last Bowel Movement: 06/25/25   I/Os  Intake & Output (last 3 days)         06/22 0701 06/23 0700 06/23 0701  06/24 0700 06/24 0701  06/25 0700 06/25 0701  06/26 0700    Blood   404     IV Piggyback   1000     Total Intake(mL/kg)   1404 (21.7)     Urine (mL/kg/hr)   1100     Blood    0    Total Output   1100 0    Net   +304 0                   Lines, Drains, Airways, & Wounds       Active LDAs       Name Placement date Placement time Site Days Last dressing change    Peripheral IV 06/25/25 0200 20 G Anterior;Right Forearm 06/25/25  0200  Forearm  less than 1 06/25/25 0210 (12.20 hrs)    Hemodialysis Cath Double 01/13/25  1505  Subclavian  162     Wound 01/31/25 1032 Right posterior back 01/31/25  1032  -- 145     Wound Bilateral midline sacral spine Pressure Injury --  --  -- --     Wound Left posterior heel Pressure Injury --  --  -- --     Wound Right posterior heel Pressure Injury --  --  -- --     Wound Left lower leg Traumatic Abrasion --  --  -- --     Wound Left anterior knee Surgical Closed Surgical Incision --  --  -- --     Wound Left anterior greater trochanter Surgical Closed Surgical Incision --  --  -- --                       Nutrition Focused Physical Exam     Trending NFPE Completed 6/25     Malnutrition Severity Assessment            (1)   Current Nutrition Orders & Evaluation of Intake      Oral Nutrition     Food Allergies  and Intolerances NKFA   Current PO Diet Diet: Liquid, Cardiac, Renal; Clear Liquid; Healthy Heart (2-3 Na+); Low Phosphorus; Fluid Consistency: Thin (IDDSI 0)   Oral Nutrition  Supplement None     Trending % PO Intake No intake documented   (2)  Assessment & Plan   Nutrition Diagnosis and Goals       Nutrition Diagnosis 1 Severe acute disease or injury related malnutrition related to GI dysfunction as evidenced by coffee ground emesis, wt loss, muscle loss      Nutrition Diagnosis 2 None        Goal(s) Establish PO Intake, Accepts Oral Nutrition Supplement, PO Diet Advances When Medically Appropriate , and No Significant Weight Loss     Nutrition Intervention and Prescription       Intervention  Liberalize diet, Start oral nutrition supplement, Continue to monitor for plan of care, and Completed NFPE      Diet Prescription Advance to High Protein, Low Phosphorous diet as tolerated  Liberalize current diet to Clear Liquid, Low Phos    - Recommend removing phos restriction     Supplement Prescription Add Boost Breeze TID (250 kcal, 9 g pro)    Education Provided     (3)  Monitoring/Evaluation       Monitor/Evaluation PO Intake, Oral Nutrition Supplement Intake, Weight, and GI Status     RD Follow-Up Encounter 3-5 days     Electronically signed by:  Luh Larson RD  06/25/25 14:22 EDT

## 2025-06-25 NOTE — ANESTHESIA POSTPROCEDURE EVALUATION
Patient: Charlette Rai    Procedure Summary       Date: 06/25/25 Room / Location: [facility] ENDOSCOPY 4 / [facility] ENDOSCOPY    Anesthesia Start: 1143 Anesthesia Stop: 1205    Procedure: ESOPHAGOGASTRODUODENOSCOPY Diagnosis:       Gastrointestinal hemorrhage, unspecified gastrointestinal hemorrhage type      (Gastrointestinal hemorrhage, unspecified gastrointestinal hemorrhage type [K92.2])    Surgeons: Tanvi Del Valle MD Provider: Ryley Marina CRNA    Anesthesia Type: general ASA Status: 4            Anesthesia Type: general    Vitals  Vitals Value Taken Time   /51 06/25/25 12:17   Temp 36.7 °C (98.1 °F) 06/25/25 12:17   Pulse 75 06/25/25 12:17   Resp 15 06/25/25 12:17   SpO2 100 % 06/25/25 12:17           Post Anesthesia Care and Evaluation    Patient location during evaluation: bedside  Patient participation: complete - patient participated  Level of consciousness: awake  Pain management: adequate    Airway patency: patent  Anesthetic complications: No anesthetic complications  PONV Status: controlled  Cardiovascular status: acceptable and stable  Respiratory status: acceptable

## 2025-06-25 NOTE — ANESTHESIA PREPROCEDURE EVALUATION
Anesthesia Evaluation     Patient summary reviewed and Nursing notes reviewed   NPO Solid Status: > 8 hours  NPO Liquid Status: > 2 hours           Airway   Mallampati: II  TM distance: >3 FB  Neck ROM: full  No difficulty expected  Dental - normal exam     Pulmonary - normal exam    breath sounds clear to auscultation  (+) pneumonia , a smoker (52.5 pack year history) Former, cigarettes, lung cancer ((2005 s/p chemo xrt Left upper lobe) currently treating for right lung CA), COPD (home oxygen 3L NC) moderate, asthma,shortness of breath  Cardiovascular   Exercise tolerance: poor (<4 METS)    ECG reviewed  Rhythm: regular  Rate: normal    (+) hypertension well controlled, valvular problems/murmurs (mild to moderate AS and MS) AS and MS, CAD, CHF Diastolic >=55%, murmur, PVD, DVT resolved, hyperlipidemia,  carotid artery disease    ROS comment: 4/22/25 ECHO   Interpretation Summary       ·  Left ventricular systolic function is normal. Calculated left ventricular EF = 60.5%  ·  Left ventricular diastolic function is consistent with (grade Ia w/high LAP) impaired relaxation.  ·  The left atrial cavity is mildly dilated.  ·  Mild to moderate aortic valve stenosis is present.  ·  Aortic valve mean pressure gradient is 16 mmHg.  ·  Mild to moderate mitral valve stenosis is present.  ·  Estimated right ventricular systolic pressure from tricuspid regurgitation is mildly elevated (35-45 mmHg)  HEART RATE=86  bpm  RR Sjdyudab=450  ms  NV Wdqmpjwt=031  ms  P Horizontal Axis=53  deg  P Front Axis=108  deg  QRSD Interval=86  ms  QT Dduhlpnp=545  ms  BNyB=916  ms  QRS Axis=81  deg  T Wave Axis=83  deg  - ABNORMAL ECG -  Sinus rhythm  Borderline right axis deviation  Consider left ventricular hypertrophy  Anterior Q waves, possibly due to LVH  Date and Time of Study:2025-06-06 20:27:29              Neuro/Psych  (+) headaches, weakness, numbness, dementia (patient oriented to self only)  GI/Hepatic/Renal/Endo    (+) GERD well  controlled, PUD, GI bleeding (06/25/25 06:36 Hemoglobin: 8.4 (L) Hematocrit: 26.7 (L)   (L): Data is abnormally low) upper active bleeding, renal disease- ESRD and dialysis, diabetes mellitus type 2 well controlled, thyroid problem hypothyroidism and thyroid nodules    Musculoskeletal     (+) back pain  Abdominal     Abdomen: soft.   Substance History - negative use     OB/GYN          Other   arthritis,   history of cancer active    ROS/Med Hx Other: Right IJ Tunnel Dialysis Catheter     Last dialysis   06/25/25 01:18  Sodium: 134 (L)  Potassium: 4.1  Chloride: 93 (L)  CO2: 27.6  Anion Gap: 13.4  BUN: 37.2 (H)  Creatinine: 2.84 (H)  BUN/Creatinine Ratio: 13.1  eGFR: 15.5 (L)  Glucose: 101 (H)  Calcium: 8.5 (L)  Alkaline Phosphatase: 296 (H)  Total Protein: 5.9 (L)  Albumin: 3.0 (L)  Globulin: 2.9  A/G Ratio: 1.0  AST (SGOT): 40 (H)  ALT (SGPT): 19  Total Bilirubin: 0.5      (L): Data is abnormally low  (H): Data is abnormally high    Talked with patient's daughter about anesthesia and received verbal consent on phone.                      Anesthesia Plan    ASA 4     general   total IV anesthesia  intravenous induction     Anesthetic plan, risks, benefits, and alternatives have been provided, discussed and informed consent has been obtained with: patient and child.  Pre-procedure education provided  Plan discussed with CRNA.    CODE STATUS:    Code Status (Patient has no pulse and is not breathing): CPR (Attempt to Resuscitate)  Medical Interventions (Patient has pulse or is breathing): Limited Support  Medical Intervention Limits: No intubation (DNI)

## 2025-06-25 NOTE — ED PROVIDER NOTES
Time: 8:37 PM EDT  Date of encounter:  6/24/2025  Independent Historian/Clinical History and Information was obtained by:   Patient, family    History is limited by: N/A    Chief Complaint: Vomiting      History of Present Illness:  Patient is a 89 y.o. year old female who presents to the emergency department for evaluation of vomiting that has gotten worse.  Patient also has had constipation.  Patient has not had a bowel movement for 5 days.  Daughter reports that the patient did have dark/black emesis.  Patient has no chest pain or shortness of breath.  Patient is had no cough.  Patient has had no fever or chills.      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 DENIES CP BUT DOES HAVE SOA CHRONIC ISSUE COPD/LUNG CANCER    COPD (chronic obstructive pulmonary disease)     FOLLOWED BY DR MARIAJOSE BAILEY    Coronary artery disease     DENIES CP BUT DOES GET SOA MOST OF THE TIME WITH EXERTION BUT OCC AT REST CHRONIC ISSUE COPD/LUNG CANCER    Deep vein thrombosis     Diabetes mellitus     DOES NOT CHECK BS DAILY    Disease of thyroid gland     HYPOTHYROIDISM    Essential hypertension     Gastric ulcer     GERD (gastroesophageal reflux disease)     Heart murmur     History of transfusion     NO ISSUES POST TRANSFUSION WAS MANY YEARS AGO     Hyperlipemia     Leukocytopenia     FOLLOWED BY DR MIR BAILEY    Limb swelling     Lumbago     Lumbar spinal stenosis     Lung cancer     Migraine headache     Multiple joint pain     On home oxygen therapy     4L/NC PRN    Osteopenia     PNA (pneumonia) 05/04/2024    Pseudomonas pneumonia 04/29/2024    Reflux esophagitis     Shortness of breath     Thyroid nodule     HAS ULTRASOUND YEARLY BEING MONITORED    Vascular disease     Vitamin D deficiency      Past Surgical History:   Procedure Laterality Date    ABDOMINAL SURGERY      ANGIOPLASTY RENAL ARTERY Left 06/14/2024    stent placement    APPENDECTOMY      ARTERIOGRAM N/A 6/14/2024    Procedure: Arteriogram, right radial access, aortogram and renal arteriogram with possible intervention.;  Surgeon: Dio Miguel MD;  Location: [facility] CATH INVASIVE LOCATION;  Service: Peripheral Vascular;  Laterality: N/A;    ARTERIOVENOUS FISTULA/SHUNT SURGERY Left 05/10/2022    Procedure: Left basilic vein transposition;  Surgeon: Wan Barksdale MD;  Location: [facility] MAIN OR;  Service: Vascular;  Laterality: Left;    ARTERIOVENOUS FISTULA/SHUNT SURGERY Left 03/23/2023    Procedure: Ligation of left arm arteriovenous fistula;  Surgeon: Wan Barksdale MD;  Location: [facility] MAIN OR;  Service: Vascular;  Laterality: Left;    ARTERIOVENOUS FISTULA/SHUNT SURGERY Left 11/30/2023    Procedure: Creation of left arm arteriovenous graft;  Surgeon: Wan Barksdale MD;  Location: [facility] MAIN OR;  Service: Vascular;  Laterality: Left;    BRONCHOSCOPY N/A 04/24/2024    Procedure: BRONCHOSCOPY WITH BAL AND WASHINGS;  Surgeon: Khalif Yanez MD;  Location: [facility] ENDOSCOPY;  Service: Pulmonary;  Laterality: N/A;  MUCUS PLUGGING    CARDIAC CATHETERIZATION  1996    CARDIAC SURGERY      CARDIAC SURGERY      fluid drained from heart    CATARACT EXTRACTION, BILATERAL  2003    COLONOSCOPY  2014    ENDOSCOPY  2016    2019    FEMORAL ARTERY STENT Bilateral     HYSTERECTOMY      LEG  THROMBECTOMY/EMBOLECTOMY Left 1/29/2025    Procedure: LEFT LOWER EXTREMITY ANGIOGRAM;  Surgeon: Jaden Castañeda MD;  Location: [facility] HYBRID OR;  Service: Vascular;  Laterality: Left;    LUNG BIOPSY Left 2005    lobectomy upper lung caner    LUNG VOLUME REDUCTION      OTHER SURGICAL HISTORY      artifical joints/limbs    REPLACEMENT TOTAL KNEE Left 2016    SHUNT O GRAM N/A 1/13/2025    Procedure: dialysis shuntogram;  Surgeon: Court Valente MD;  Location: [facility] CATH INVASIVE LOCATION;  Service: Peripheral Vascular;  Laterality: N/A;    UPPER GASTROINTESTINAL ENDOSCOPY       Family History   Problem Relation Age of Onset    Arthritis Mother     Arthritis Father     Cancer Brother     Diabetes Brother     Other Brother         blood disease     Prostate cancer Brother     Cancer Brother     Prostate cancer Brother     Cancer Brother     Cancer Brother     Malig Hyperthermia Neg Hx     Colon cancer Neg Hx        Home Medications:  Prior to Admission medications    Medication Sig Start Date End Date Taking? Authorizing Provider   acetaminophen (TYLENOL) 325 MG tablet Take 2 tablets by mouth Every 6 (Six) Hours As Needed for Mild Pain.   Yes ProviderSarah MD   albuterol sulfate  (90 Base) MCG/ACT inhaler Inhale 2 puffs Every 4 (Four) Hours As Needed for Wheezing. 4/22/25  Yes Khalif Yanez MD   amLODIPine (NORVASC) 5 MG tablet Take 1 tablet by mouth Daily. 6/13/25  Yes Paloma Rodas APRN   aspirin 81 MG EC tablet Take 1 tablet by mouth Daily.   Yes ProviderSarah MD   budesonide (Pulmicort) 0.5 MG/2ML nebulizer solution Take 2 mL by nebulization 2 (Two) Times a Day. 2/4/25  Yes Vandana Hannah APRN   carvedilol (COREG) 25 MG tablet Take 1 tablet by mouth 2 (Two) Times a Day With Meals. 9/24/24  Yes Halima Quick APRN   clopidogrel (Plavix) 75 MG tablet Take 1 tablet by mouth Daily. 5/23/25 5/23/26 Yes Anali Sears APRN   dexlansoprazole (DEXILANT) 60 MG capsule Take 1 capsule by mouth  Daily. 8/5/24  Yes Brennan Mosqueda MD   ferrous gluconate (FERGON) 324 MG tablet Take 1 tablet by mouth 3 (Three) Times a Week. 3/26/25  Yes Nicole Stern APRN   fluticasone (FLONASE) 50 MCG/ACT nasal spray Administer 1 spray into the nostril(s) as directed by provider Daily. 9/12/24  Yes Sarah Parker MD   furosemide (LASIX) 20 MG tablet Take 1 tablet by mouth Daily. 6/9/25  Yes Paloma Rodas APRN   ipratropium-albuterol (DUO-NEB) 0.5-2.5 mg/3 ml nebulizer Take 3 mL by nebulization Every 6 (Six) Hours As Needed for Wheezing. 2/4/25  Yes Vandana Hannah APRN   isosorbide mononitrate (IMDUR) 30 MG 24 hr tablet Take 1 tablet by mouth Every Night. 9/17/24  Yes Jazzy Addison APRN   levocetirizine (XYZAL) 5 MG tablet TAKE 1 TABLET DAILY 6/23/23  Yes Paloma James MD   levothyroxine (SYNTHROID, LEVOTHROID) 50 MCG tablet Take 1 tablet by mouth Daily. 7/8/24  Yes Sarah Parker MD   lidocaine-prilocaine (EMLA) 2.5-2.5 % cream Apply 1 Application topically to the appropriate area as directed Take As Directed. Apply to dialysis access site 30-45 minutes prior to dialysis 9/13/24  Yes Sarah Parker MD   losartan (Cozaar) 25 MG tablet Take 1 tablet by mouth Every Night. Half tab for one week then 1 tab if SBP is greater than 140 2/4/25  Yes Halima Quick APRN   losartan (Cozaar) 50 MG tablet Take 1 tablet by mouth 2 (Two) Times a Day. 3/18/25  Yes Brennan Mosqueda MD   Psyllium ([facility] DAILY FIBER PO) Take 2 tablets by mouth Daily.   Yes Sarah Parker MD   rosuvastatin (CRESTOR) 20 MG tablet Take 1 tablet by mouth Every Night. 9/17/24  Yes Jazzy Addison APRN   sevelamer (RENVELA) 800 MG tablet Take 1 tablet by mouth 3 (Three) Times a Day With Meals. 11/19/24  Yes Brennan Mosqueda MD   tobramycin PF (JUAN) 300 MG/5ML nebulizer solution Take 5 mL by nebulization 2 (Two) Times a Day. 30 days on 30 days off 9/12/24  Yes Laura Franco,  "APRN   vitamin D (ERGOCALCIFEROL) 1.25 MG (28153 UT) capsule capsule Take 1 capsule by mouth Every 14 (Fourteen) Days.  Patient taking differently: Take 1 capsule by mouth Every 14 (Fourteen) Days. The 15 and 30 of the month 10/24/24  Yes Brennan Mosqueda MD   Methoxy PEG-Epoetin Beta (MIRCERA IJ) 200 mcg Every 14 (Fourteen) Days. 25  Provider, MD Sarah        Social History:   Social History     Tobacco Use    Smoking status: Former     Current packs/day: 0.00     Average packs/day: 1 pack/day for 52.5 years (52.5 ttl pk-yrs)     Types: Cigarettes     Start date:      Quit date: 2004     Years since quittin.0     Passive exposure: Past    Smokeless tobacco: Never   Vaping Use    Vaping status: Never Used   Substance Use Topics    Alcohol use: Not Currently     Comment: beer many years ago    Drug use: Never         Review of Systems:  Review of Systems   Constitutional:  Negative for chills and fever.   HENT:  Negative for congestion, rhinorrhea and sore throat.    Eyes:  Negative for pain and visual disturbance.   Respiratory:  Negative for apnea, cough, chest tightness and shortness of breath.    Cardiovascular:  Negative for chest pain and palpitations.   Gastrointestinal:  Positive for abdominal pain and vomiting. Negative for diarrhea and nausea.   Genitourinary:  Negative for difficulty urinating and dysuria.   Musculoskeletal:  Negative for joint swelling and myalgias.   Skin:  Negative for color change.   Neurological:  Negative for seizures and headaches.   Psychiatric/Behavioral: Negative.     All other systems reviewed and are negative.       Physical Exam:  /60   Pulse 87   Temp 98.9 °F (37.2 °C) (Oral)   Resp 16   Ht 165.1 cm (65\")   Wt 62.8 kg (138 lb 7.2 oz)   LMP  (LMP Unknown)   SpO2 100%   BMI 23.04 kg/m²     Physical Exam  Vitals and nursing note reviewed.   Constitutional:       General: She is not in acute distress.     Appearance: " Normal appearance. She is not toxic-appearing.   HENT:      Head: Normocephalic and atraumatic.      Jaw: There is normal jaw occlusion.   Eyes:      General: Lids are normal.      Extraocular Movements: Extraocular movements intact.      Conjunctiva/sclera: Conjunctivae normal.      Pupils: Pupils are equal, round, and reactive to light.   Cardiovascular:      Rate and Rhythm: Normal rate and regular rhythm.      Pulses: Normal pulses.      Heart sounds: Normal heart sounds.   Pulmonary:      Effort: Pulmonary effort is normal. No respiratory distress.      Breath sounds: Normal breath sounds. No wheezing or rhonchi.   Abdominal:      General: Abdomen is flat.      Palpations: Abdomen is soft.      Tenderness: There is abdominal tenderness. There is no guarding or rebound.   Musculoskeletal:         General: Normal range of motion.      Cervical back: Normal range of motion and neck supple.      Right lower leg: No edema.      Left lower leg: No edema.   Skin:     General: Skin is warm and dry.   Neurological:      Mental Status: She is alert and oriented to person, place, and time. Mental status is at baseline.   Psychiatric:         Mood and Affect: Mood normal.                    Medical Decision Making:      Comorbidities that affect care:    End-stage renal disease, on dialysis    External Notes reviewed:    Previous Labs: Patient had a recent hemoglobin of 9.3.      The following orders were placed and all results were independently analyzed by me:  Orders Placed This Encounter   Procedures    XR Abdomen Flat & Upright    CT Abdomen Pelvis Without Contrast    XR Chest 1 View    [place] Draw    Comprehensive Metabolic Panel    Lipase    Urinalysis With Microscopic If Indicated (No Culture) - Urine, Clean Catch    Lactic Acid, Plasma    CBC Auto Differential    aPTT    Protime-INR    Comprehensive Metabolic Panel    CBC Auto Differential    Hemoglobin & Hematocrit, Blood    NPO Diet NPO Type: Strict NPO     Undress & Gown    Vital Signs    Intake & Output    Weigh Patient    Oral Care    Maintain IV Access    Telemetry - Place Orders & Notify Provider of Results When Patient Experiences Acute Chest Pain, Dysrhythmia or Respiratory Distress    Verify Informed Consent for Blood Product Administration    Code Status and Medical Interventions: CPR (Attempt to Resuscitate); Limited Support; No intubation (DNI)    Inpatient Hospitalist Consult    Gastroenterology (on-call MD unless specified)    Hospitalist (on-call MD unless specified)    Inpatient Nephrology Consult    Type & Screen    Prepare RBC, 1 Units    Insert Peripheral IV    Insert Peripheral IV    Inpatient Admission    CBC & Differential    Green Top (Gel)    Lavender Top    Gold Top - SST    Light Blue Top    CBC & Differential       Medications Given in the Emergency Department:  Medications   sodium chloride 0.9 % flush 10 mL (has no administration in time range)   sodium chloride 0.9 % flush 10 mL (has no administration in time range)   sodium chloride 0.9 % flush 10 mL (has no administration in time range)   sodium chloride 0.9 % infusion 40 mL (has no administration in time range)   nitroglycerin (NITROSTAT) SL tablet 0.4 mg (has no administration in time range)   ondansetron (ZOFRAN) injection 4 mg (has no administration in time range)   pantoprazole (PROTONIX) injection 40 mg (has no administration in time range)   acetaminophen (TYLENOL) tablet 650 mg (has no administration in time range)   hydrALAZINE (APRESOLINE) injection 10 mg (has no administration in time range)   sodium chloride 0.9 % bolus 1,000 mL (0 mL Intravenous Stopped 6/24/25 1914)   ondansetron (ZOFRAN) injection 4 mg (4 mg Intravenous Given 6/24/25 1844)   famotidine (PEPCID) injection 20 mg (20 mg Intravenous Given 6/24/25 1844)   pantoprazole (PROTONIX) injection 40 mg (40 mg Intravenous Given 6/24/25 2055)        ED Course:         Labs:    Lab Results (last 24 hours)       Procedure  Component Value Units Date/Time    CBC & Differential [[id number]]  (Abnormal) Collected: 06/24/25 1842    Specimen: Blood Updated: 06/24/25 1913    Narrative:      The following orders were created for panel order CBC & Differential.  Procedure                               Abnormality         Status                     ---------                               -----------         ------                     CBC Auto Differential[[id number]]        Abnormal            Final result                 Please view results for these tests on the individual orders.    Comprehensive Metabolic Panel [[id number]]  (Abnormal) Collected: 06/24/25 1842    Specimen: Blood Updated: 06/24/25 1943     Glucose 121 mg/dL      BUN 33.0 mg/dL      Creatinine 2.65 mg/dL      Sodium 133 mmol/L      Potassium 4.3 mmol/L      Chloride 92 mmol/L      CO2 29.5 mmol/L      Calcium 8.2 mg/dL      Total Protein 5.8 g/dL      Albumin 3.3 g/dL      ALT (SGPT) 21 U/L      AST (SGOT) 45 U/L      Alkaline Phosphatase 308 U/L      Total Bilirubin 0.5 mg/dL      Globulin 2.5 gm/dL      A/G Ratio 1.3 g/dL      BUN/Creatinine Ratio 12.5     Anion Gap 11.5 mmol/L      eGFR 16.9 mL/min/1.73     Narrative:      GFR Categories in Chronic Kidney Disease (CKD)              GFR Category          GFR (mL/min/1.73)    Interpretation  G1                    90 or greater        Normal or high (1)  G2                    60-89                Mild decrease (1)  G3a                   45-59                Mild to moderate decrease  G3b                   30-44                Moderate to severe decrease  G4                    15-29                Severe decrease  G5                    14 or less           Kidney failure    (1)In the absence of evidence of kidney disease, neither GFR category G1 or G2 fulfill the criteria for CKD.    eGFR calculation 2021 CKD-EPI creatinine equation, which does not include race as a factor    Lipase [[id number]]  (Normal) Collected: 06/24/25 1842     Specimen: Blood Updated: 06/24/25 1943     Lipase 22 U/L     Lactic Acid, Plasma [[id number]]  (Normal) Collected: 06/24/25 1842    Specimen: Blood Updated: 06/24/25 1940     Lactate 1.1 mmol/L     CBC Auto Differential [[id number]]  (Abnormal) Collected: 06/24/25 1842    Specimen: Blood Updated: 06/24/25 1913     WBC 14.36 10*3/mm3      RBC 2.25 10*6/mm3      Hemoglobin 7.0 g/dL      Hematocrit 22.8 %      .3 fL      MCH 31.1 pg      MCHC 30.7 g/dL      RDW 17.6 %      RDW-SD 64.3 fl      MPV 9.8 fL      Platelets 196 10*3/mm3      Neutrophil % 91.9 %      Lymphocyte % 2.3 %      Monocyte % 4.7 %      Eosinophil % 0.3 %      Basophil % 0.2 %      Immature Grans % 0.6 %      Neutrophils, Absolute 13.20 10*3/mm3      Lymphocytes, Absolute 0.33 10*3/mm3      Monocytes, Absolute 0.68 10*3/mm3      Eosinophils, Absolute 0.04 10*3/mm3      Basophils, Absolute 0.03 10*3/mm3      Immature Grans, Absolute 0.08 10*3/mm3      nRBC 0.0 /100 WBC              Imaging:    XR Chest 1 View  Result Date: 6/24/2025  XR CHEST 1 VW Date of Exam: 6/24/2025 9:20 PM EDT Indication: Cough. History of lung cancer. Comparison: 6/6/2025 Findings: Dialysis catheter is at the cavoatrial junction. Chronic scarring noted right midlung. The right lung is otherwise clear. There is volume loss in the left hemithorax with shift of the midline structures to the left, unchanged. There is atherosclerotic disease in the aorta, and there are calcified left-sided pleural plaques, also unchanged. There is slight improvement in aeration of the left lung relative to the prior study. No pneumothorax on either side of the chest. A vascular stent is present in the left upper extremity.     1.Slight improvement in aeration of the left lung relative to the prior study. 2.Otherwise no significant interval change. Electronically Signed: Weston Castillo MD  6/24/2025 9:31 PM EDT  Workstation ID: [id number]    CT Abdomen Pelvis Without Contrast  Result Date:  6/24/2025  CT ABDOMEN PELVIS WO CONTRAST Date of Exam: 6/24/2025 8:55 PM EDT Indication: Abdominal and flank pain. Vomiting. Constipation. Comparison: 1/27/2025 Technique: Axial CT images were obtained of the abdomen and pelvis without the administration of contrast. Reconstructed coronal and sagittal images were also obtained. Automated exposure control and iterative construction methods were used. Findings: Calcified pleural plaques in the left lower hemithorax with subjacent rounded atelectasis in the left lung base appears stable. Pericardial calcifications are again seen. There is a trace amount of right pleural fluid that is improved from prior. Mitral valve annulus calcifications are present. Gallbladder is hydropic measuring about 5.2 cm short axis. It contains multiple small stones. No definite gallbladder wall thickening or adjacent inflammatory change. Common bile duct measures about 13 mm which is unchanged. No calcified stones are seen in the common bile duct. Multiple bilateral hypo and hyperdense renal lesions are again seen previously evaluated with multiphase CT 11/26/2024. No renal or ureteral stones are identified. There is no hydronephrosis. The unenhanced solid abdominal organs otherwise appear grossly normal. Urinary bladder is markedly distended. Correlate for bladder outlet obstruction. Uterus surgically absent. There has been interval development of anasarca, and there is a small amount of dependent free fluid in the presacral space. There is some asymmetric skin thickening and subcutaneous edema in the proximal left thigh. There is a large colonic stool burden, increased from prior, compatible with the history of constipation. There is some stranding adjacent to the rectosigmoid colon could reflect mild stercoral colitis. There is colonic diverticulosis without focal diverticulitis. The appendix is surgically absent. No small bowel obstruction is seen. Postoperative changes are noted near  the GE junction, and there is a small hiatal hernia.  Stomach is otherwise normal. No bulky adenopathy is identified. Intramedullary sofía is present in the left femur.     1.Large colonic stool burden compatible with the history of constipation. There is some stranding adjacent to the rectosigmoid colon, which could reflect mild stercoral colitis. No bowel obstruction. 2.Distended urinary bladder. Correlate for bladder outlet obstruction. 3.Hydropic gallbladder containing multiple small stones. No definite gallbladder wall thickening or adjacent inflammatory change. 4.Interval development of anasarca. There is some asymmetric skin thickening and subcutaneous edema in the proximal left thigh. Correlate for cellulitis. 5.Trace right pleural effusion, improved from prior. 6.Additional findings as described above. Electronically Signed: Weston Castillo MD  6/24/2025 9:29 PM EDT  Workstation ID: [id number]    XR Abdomen Flat & Upright  Result Date: 6/24/2025  XR ABDOMEN FLAT AND UPRIGHT Date of Exam: 6/24/2025 6:43 PM EDT Indication: ABD PAIN, FEVER OR DIARRHEA, CROHN DISEASE SUSPECTED, INITIAL PRESENTATION Abdominal pain evaluate for air fluid levels Comparison: Chest radiograph 6/6/2025 Findings: Extensive aortic atherosclerotic disease. Dual lumen right IJ central catheter tip near cavoatrial junction. Postsurgical changes in the left hemithorax. Small left effusion and/or chronic pleural thickening in addition to pleural calcifications. Linear bandlike atelectasis/scar in the right upper lobe similar to prior. Possible pneumothorax measuring up to 2.4 cm along the lateral margin of the left lower lobe. Large volume formed colonic stool. No significantly dilated loops of bowel to suggest ileus or obstruction. No gastric distention. Left renal artery stent. Kissing iliac stents. Severe right and probably moderate left degenerative osteoarthritis of the hip joints. Osseous demineralization limiting detection for fractures.  Multilevel thoracolumbar spondylosis with lumbar levocurvature.     Impression: 1. Large volume colonic stool consistent with constipation. No evidence of bowel obstruction. 2. Postsurgical changes of the left hemithorax. There is a questionable left pneumothorax. Recommend dedicated chest radiograph. Electronically Signed: Ildefonso Purcell MD  6/24/2025 7:21 PM EDT  Workstation ID: [id number]        Differential Diagnosis and Discussion:    Vomiting: Differential diagnosis includes but is not limited to migraine, labyrinthine disorders, psychogenic, metabolic and endocrine causes, peptic ulcer, gastric outlet obstruction, gastritis, gastroenteritis, appendicitis, intestinal obstruction, paralytic ileus, food poisoning, cholecystitis, acute hepatitis, acute pancreatitis, acute febrile illness, and myocardial infarction.    PROCEDURES:    Labs were collected in the emergency department and all labs were reviewed and interpreted by me.  X-ray were performed in the emergency department and all X-ray impressions were independently interpreted by me.  An EKG was performed and the EKG was interpreted by me.  CT scan was performed in the emergency department and the CT scan radiology impression was interpreted by me.    No orders to display       Procedures    MDM     Amount and/or Complexity of Data Reviewed  Decide to obtain previous medical records or to obtain history from someone other than the patient: yes       The patient´s CBC that was reviewed and interpreted by me shows no abnormalities of critical concern. Of note, there is no anemia requiring a blood transfusion and the platelet count is acceptable.  The patient´s CMP that was reviewed and interpretted by me shows no abnormalities of critical concern. Of note, the patient´s sodium and potassium are acceptable. The patient´s liver enzymes are unremarkable. The patient´s renal function (creatinine) is elevated. The patient has a normal anion gap.  Patient was given  Protonix and a 2 unit PRBC transfusion.      Total Critical Care time of 35 minutes. Total critical care time documented does not include time spent on separately billed procedures for services of nurses or physician assistants. I personally saw and examined the patient. I have reviewed all diagnostic interpretations and treatment plans as written. I was present for the key portions of any procedures performed and the inclusive time noted in any critical care statement. Critical care time includes patient management by me, time spent at the patients bedside,  time to review lab and imaging results, discussing patient care, documentation in the medical record, and time spent with family or caregiver.          Patient Care Considerations:    None      Consultants/Shared Management Plan:    Case discussed with Dr. Del Valle who agrees to consult.  Case was discussed with Dr. Rodriguez who agrees to admit the patient.    Social Determinants of Health:    Patient is independent, reliable, and has access to care.       Disposition and Care Coordination:    Admit:   Through independent evaluation of the patient's history, physical, and imperical data, the patient meets criteria for inpatient admission to the hospital.        Final diagnoses:   Gastrointestinal hemorrhage, unspecified gastrointestinal hemorrhage type        ED Disposition       ED Disposition   Decision to Admit    Condition   --    Comment   Level of Care: Critical Care [6]   Diagnosis: GI bleed [[id number]]   Admitting Physician: GLENDA RODRIGUEZ [[id number]]   Certification: I Certify That Inpatient Hospital Services Are Medically Necessary For Greater Than 2 Midnights                 This medical record created using voice recognition software.             Ken Cuenca MD  06/25/25 0051

## 2025-06-25 NOTE — ED NOTES
Nursing report ED to floor  Charlette Rai  89 y.o.  female    HPI :  HPI  Stated Reason for Visit: Pt arrives to ED for vomiting dark vomit. Pt states it started last last night. Pt is in rehab for left femur fracture. Pt has been constipated for a few days. Pt had enema last night  History Obtained From: patient, EMS    Chief Complaint  Chief Complaint   Patient presents with    Vomiting     Pt arrives to ED for vomiting dark vomit. Pt states it started last last night. Pt is in rehab for left femur fracture. Pt has been constipated for a few days. Pt had enema last night         Admitting doctor:   Jared Rivera MD    Admitting diagnosis:   The encounter diagnosis was Gastrointestinal hemorrhage, unspecified gastrointestinal hemorrhage type.    Code status:   Current Code Status       Date Active Code Status Order ID Comments User Context       6/24/2025 2336 CPR (Attempt to Resuscitate) [id number]  Jared Rivera MD ED        Question Answer    Code Status (Patient has no pulse and is not breathing) CPR (Attempt to Resuscitate)    Medical Interventions (Patient has pulse or is breathing) Limited Support    Medical Intervention Limits: No intubation (DNI)                    Allergies:   Patient has no known allergies.    Isolation:   No active isolations    Intake and Output    Intake/Output Summary (Last 24 hours) at 6/25/2025 0221  Last data filed at 6/24/2025 1914  Gross per 24 hour   Intake 1000 ml   Output --   Net 1000 ml       Weight:       06/24/25  1804   Weight: 62.8 kg (138 lb 7.2 oz)       Most recent vitals:   Vitals:    06/25/25 0132 06/25/25 0145 06/25/25 0200 06/25/25 0215   BP: 148/53 122/45  135/87   Pulse: 90 87 89 89   Resp: 14      Temp: 98.7 °F (37.1 °C)      TempSrc:       SpO2: 100% 99% 99% 100%   Weight:       Height:           Active LDAs/IV Access:   Lines, Drains & Airways       Active LDAs       Name Placement date Placement time Site Days    Peripheral IV 06/25/25 0200 20 G  Anterior;Right Forearm 06/25/25  0200  Forearm  less than 1    Hemodialysis Cath Double 01/13/25  1505  Subclavian  162                    Labs (abnormal labs have a star):   Labs Reviewed   COMPREHENSIVE METABOLIC PANEL - Abnormal; Notable for the following components:       Result Value    Glucose 121 (*)     BUN 33.0 (*)     Creatinine 2.65 (*)     Sodium 133 (*)     Chloride 92 (*)     CO2 29.5 (*)     Calcium 8.2 (*)     Total Protein 5.8 (*)     Albumin 3.3 (*)     AST (SGOT) 45 (*)     Alkaline Phosphatase 308 (*)     eGFR 16.9 (*)     All other components within normal limits    Narrative:     GFR Categories in Chronic Kidney Disease (CKD)              GFR Category          GFR (mL/min/1.73)    Interpretation  G1                    90 or greater        Normal or high (1)  G2                    60-89                Mild decrease (1)  G3a                   45-59                Mild to moderate decrease  G3b                   30-44                Moderate to severe decrease  G4                    15-29                Severe decrease  G5                    14 or less           Kidney failure    (1)In the absence of evidence of kidney disease, neither GFR category G1 or G2 fulfill the criteria for CKD.    eGFR calculation 2021 CKD-EPI creatinine equation, which does not include race as a factor   CBC WITH AUTO DIFFERENTIAL - Abnormal; Notable for the following components:    WBC 14.36 (*)     RBC 2.25 (*)     Hemoglobin 7.0 (*)     Hematocrit 22.8 (*)     .3 (*)     MCHC 30.7 (*)     RDW 17.6 (*)     RDW-SD 64.3 (*)     Neutrophil % 91.9 (*)     Lymphocyte % 2.3 (*)     Monocyte % 4.7 (*)     Immature Grans % 0.6 (*)     Neutrophils, Absolute 13.20 (*)     Lymphocytes, Absolute 0.33 (*)     Immature Grans, Absolute 0.08 (*)     All other components within normal limits   APTT - Abnormal; Notable for the following components:    PTT 41.6 (*)     All other components within normal limits   COMPREHENSIVE  METABOLIC PANEL - Abnormal; Notable for the following components:    Glucose 101 (*)     BUN 37.2 (*)     Creatinine 2.84 (*)     Sodium 134 (*)     Chloride 93 (*)     Calcium 8.5 (*)     Total Protein 5.9 (*)     Albumin 3.0 (*)     AST (SGOT) 40 (*)     Alkaline Phosphatase 296 (*)     eGFR 15.5 (*)     All other components within normal limits    Narrative:     GFR Categories in Chronic Kidney Disease (CKD)              GFR Category          GFR (mL/min/1.73)    Interpretation  G1                    90 or greater        Normal or high (1)  G2                    60-89                Mild decrease (1)  G3a                   45-59                Mild to moderate decrease  G3b                   30-44                Moderate to severe decrease  G4                    15-29                Severe decrease  G5                    14 or less           Kidney failure    (1)In the absence of evidence of kidney disease, neither GFR category G1 or G2 fulfill the criteria for CKD.    eGFR calculation 2021 CKD-EPI creatinine equation, which does not include race as a factor   CBC WITH AUTO DIFFERENTIAL - Abnormal; Notable for the following components:    WBC 18.98 (*)     RBC 2.25 (*)     Hemoglobin 7.1 (*)     Hematocrit 23.0 (*)     .2 (*)     MCHC 30.9 (*)     RDW 17.8 (*)     RDW-SD 66.4 (*)     Neutrophil % 93.3 (*)     Lymphocyte % 1.7 (*)     Monocyte % 4.3 (*)     Eosinophil % 0.1 (*)     Neutrophils, Absolute 17.70 (*)     Lymphocytes, Absolute 0.32 (*)     Immature Grans, Absolute 0.10 (*)     All other components within normal limits   HEMOGLOBIN AND HEMATOCRIT, BLOOD - Abnormal; Notable for the following components:    Hemoglobin 7.1 (*)     Hematocrit 23.3 (*)     All other components within normal limits   LIPASE - Normal   LACTIC ACID, PLASMA - Normal   PROTIME-INR - Normal    Narrative:     Suggested Therapeutic Ranges For Oral Anticoagulant Therapy:  Level of Therapy                      INR Target  Range  Standard Dose                            2.0-3.0  High Dose                                2.5-3.5  Patients not receiving anticoagulant  Therapy Normal Range                     0.86-1.15   RAINBOW DRAW    Narrative:     The following orders were created for panel order [place] Draw.  Procedure                               Abnormality         Status                     ---------                               -----------         ------                     Green Top (Gel)[[id number]]                                  Final result               Lavender Top[[id number]]                                     Final result               Gold Top - SST[[id number]]                                   Final result               Light Blue Top[[id number]]                                   Final result                 Please view results for these tests on the individual orders.   URINALYSIS W/ MICROSCOPIC IF INDICATED (NO CULTURE)   TYPE AND SCREEN   PREPARE RBC   CBC AND DIFFERENTIAL    Narrative:     The following orders were created for panel order CBC & Differential.  Procedure                               Abnormality         Status                     ---------                               -----------         ------                     CBC Auto Differential[[id number]]        Abnormal            Final result                 Please view results for these tests on the individual orders.   GREEN TOP   LAVENDER TOP   GOLD TOP - SST   LIGHT BLUE TOP   CBC AND DIFFERENTIAL    Narrative:     The following orders were created for panel order CBC & Differential.  Procedure                               Abnormality         Status                     ---------                               -----------         ------                     CBC Auto Differential[[id number]]        Abnormal            Final result                 Please view results for these tests on the individual orders.   EXTRA TUBES    Narrative:     The following  orders were created for panel order Extra Tubes.  Procedure                               Abnormality         Status                     ---------                               -----------         ------                     Green Top (Gel)[[id number]]                                  Final result                 Please view results for these tests on the individual orders.   GREEN TOP       EKG:   No orders to display       Meds given in ED:   Medications   sodium chloride 0.9 % flush 10 mL (has no administration in time range)   sodium chloride 0.9 % flush 10 mL (has no administration in time range)   sodium chloride 0.9 % flush 10 mL (has no administration in time range)   sodium chloride 0.9 % infusion 40 mL (has no administration in time range)   nitroglycerin (NITROSTAT) SL tablet 0.4 mg (has no administration in time range)   ondansetron (ZOFRAN) injection 4 mg (has no administration in time range)   pantoprazole (PROTONIX) injection 40 mg (has no administration in time range)   acetaminophen (TYLENOL) tablet 650 mg (has no administration in time range)   hydrALAZINE (APRESOLINE) injection 10 mg (has no administration in time range)   sodium chloride 0.9 % bolus 1,000 mL (0 mL Intravenous Stopped 6/24/25 1914)   ondansetron (ZOFRAN) injection 4 mg (4 mg Intravenous Given 6/24/25 1844)   famotidine (PEPCID) injection 20 mg (20 mg Intravenous Given 6/24/25 1844)   pantoprazole (PROTONIX) injection 40 mg (40 mg Intravenous Given 6/24/25 2055)       Imaging results:  XR Chest 1 View  Result Date: 6/24/2025  1.Slight improvement in aeration of the left lung relative to the prior study. 2.Otherwise no significant interval change. Electronically Signed: Weston Castillo MD  6/24/2025 9:31 PM EDT  Workstation ID: [id number]    CT Abdomen Pelvis Without Contrast  Result Date: 6/24/2025  1.Large colonic stool burden compatible with the history of constipation. There is some stranding adjacent to the rectosigmoid colon,  which could reflect mild stercoral colitis. No bowel obstruction. 2.Distended urinary bladder. Correlate for bladder outlet obstruction. 3.Hydropic gallbladder containing multiple small stones. No definite gallbladder wall thickening or adjacent inflammatory change. 4.Interval development of anasarca. There is some asymmetric skin thickening and subcutaneous edema in the proximal left thigh. Correlate for cellulitis. 5.Trace right pleural effusion, improved from prior. 6.Additional findings as described above. Electronically Signed: Weston Castillo MD  2025 9:29 PM EDT  Workstation ID: [id number]    XR Abdomen Flat & Upright  Result Date: 2025  Impression: 1. Large volume colonic stool consistent with constipation. No evidence of bowel obstruction. 2. Postsurgical changes of the left hemithorax. There is a questionable left pneumothorax. Recommend dedicated chest radiograph. Electronically Signed: Ildefonso Purcell MD  2025 7:21 PM EDT  Workstation ID: [id number]      Ambulatory status:   - Bed bound.  Healing Left Femur Fracture. Using Bed Saleh.     Social issues:   Social History     Socioeconomic History    Marital status:    Tobacco Use    Smoking status: Former     Current packs/day: 0.00     Average packs/day: 1 pack/day for 52.5 years (52.5 ttl pk-yrs)     Types: Cigarettes     Start date:      Quit date: 2004     Years since quittin.0     Passive exposure: Past    Smokeless tobacco: Never   Vaping Use    Vaping status: Never Used   Substance and Sexual Activity    Alcohol use: Not Currently     Comment: beer many years ago    Drug use: Never    Sexual activity: Defer       Peripheral Neurovascular  Peripheral Neurovascular (Adult)  Peripheral Neurovascular WDL: WDL    Neuro Cognitive  Neuro Cognitive (Adult)  Cognitive/Neuro/Behavioral WDL: WDL    Learning  Learning Assessment  Learning Readiness and Ability: no barriers identified  Education Provided  Person Taught:  patient  Teaching Method: verbal instruction  Teaching Focus: symptom/problem overview, risk factors/triggers, self-management  Education Outcome Evaluation: eager to learn    Respiratory  Respiratory  Airway WDL: .WDL except  Additional Documentation: Breath Sounds (Group)  Respiratory WDL  Respiratory WDL: WDL  Breath Sounds  All Lung Fields Breath Sounds: All Fields  All Lung Fields Breath Sounds: Anterior:, diminished    Abdominal Pain  Safety Interventions  Safety Precautions/Falls Reduction: commode/urinal/bedpan at bedside    Pain Assessments  Pain (Adult)  (0-10) Pain Rating: Rest: 8  (0-10) Pain Rating: Activity: 8    NIH Stroke Scale       Kassandra Aviles RN  06/25/25 02:21 EDT

## 2025-06-25 NOTE — PLAN OF CARE
Goal Outcome Evaluation:  Plan of Care Reviewed With: patient        Progress: improving  Outcome Evaluation: Pt admitted to CCU with GI bleed. No vomiting or bowel movements since arriving on unit. 1 unit of PRBC given, tolerated well. VSS, pt hypertensive at times. Pt alert but disoriented to time, place and situation. Continue plan of care.

## 2025-06-25 NOTE — PROGRESS NOTES
[facility]   Hospitalist Progress Note  Date: 2025  Patient Name: Charlette Rai  : 1935  MRN: [id number]  Date of admission: 2025  Consultants:   -Gastroenterology: Dr. Tanvi Del Valle  -Nephrology: Dr. Edi García  -Pulmonology: Dr. Zbigniew Duarte    Subjective   Subjective     Chief Complaint: Hematemesis and generalized weakness    Summary:   Charlette Rai is a 89 y.o. female with ESRD on HD, CHF, colon cancer, type 2 diabetes mellitus, hypothyroidism and hypertension presented to ED with complaints of hematemesis and worsening weakness.  Patient found to be anemic in the ED.  CT abdomen pelvis obtained that showed large colonic stool burden.  Patient admitted to ICU level of care.  Pulmonology, gastroenterology and nephrology consulted.  Patient transfused 1 unit PRBCs.  Patient underwent EGD on 2025 that showed esophagitis and gastritis.  Ceftriaxone started for UTI.    Interval Followup:   Patient seen after EGD.  Patient denied any active chest pain or shortness of breath.  Says that she is feeling better overall.  No complaints of nausea.  Nursing with no additional acute issues to report.    Procedures:   - EGD (2025)    Antibiotics:   - Ceftriaxone    Objective   Objective     Vitals:   Temp:  [97.5 °F (36.4 °C)-99.5 °F (37.5 °C)] 98.1 °F (36.7 °C)  Heart Rate:  [66-91] 82  Resp:  [14-22] 16  BP: (108-168)/() 153/61  Flow (L/min) (Oxygen Therapy):  [3] 3  Physical Exam   Gen: Conversant, Pleasant, elderly female, lying in bed  Resp: Normal respiratory effort  Card: RRR, No m/r/g  Abd: Soft, Nontender, Nondistended, + bowel sounds    Result Review    Result Review:  I have personally reviewed the results as below and agree with these findings:  []  Laboratory:   CMP          2025    18:20 2025    18:42 2025    01:18   CMP   Glucose 119  121  101    BUN 13.3  33.0  37.2    Creatinine 1.91  2.65  2.84    EGFR 25.0  16.9  15.5    Sodium 138  133   134    Potassium 4.1  4.3  4.1    Chloride 100  92  93    Calcium 8.7  8.2  8.5    Total Protein 6.3  5.8  5.9    Albumin 3.5  3.3  3.0    Globulin 2.8  2.5  2.9    Total Bilirubin 0.4  0.5  0.5    Alkaline Phosphatase 342  308  296    AST (SGOT) 23  45  40    ALT (SGPT) 16  21  19    Albumin/Globulin Ratio 1.3  1.3  1.0    BUN/Creatinine Ratio 7.0  12.5  13.1    Anion Gap 7.9  11.5  13.4      CBC          6/6/2025    18:20 6/24/2025    18:42 6/25/2025    01:17 6/25/2025    06:36   CBC   WBC 7.26  14.36  18.98     RBC 3.34  2.25  2.25     Hemoglobin 10.3  7.0  7.1     7.1  8.4    Hematocrit 34.9  22.8  23.0     23.3  26.7    .5  101.3  102.2     MCH 30.8  31.1  31.6     MCHC 29.5  30.7  30.9     RDW 15.6  17.6  17.8     Platelets 135  196  214     INR: 1.12.  []  Microbiology:   []  Radiology:   [x]  EKG/Telemetry:    []  Cardiology/Vascular:    []  Pathology:  []  Old records:  []  Other:    Assessment & Plan   Assessment / Plan     Assessment:  GI bleed  Coffee-ground emesis  Acute anemia secondary to GI bleed  ESRD on hemodialysis  Urinary tract infection due to unknown infectious organism  Severe protein calorie malnutrition  Constipation  Hypertension  History of renal artery stenosis  Chronic diastolic heart failure, not acutely exacerbated  Hypothyroidism    Plan:  -Gastroenterology, Nephrology and Pulmonology consulted and following, appreciate assistance and recommendations in the care of this patient.  -Care discussed with gastroenterology.  Results of EGD discussed.  Will continue to monitor.  No plan for colonoscopy at this time.  -Monitor hemoglobin.  Transfuse accordingly.  -Dialysis per nephrology  -Ceftriaxone started.  Continue for total 3 days (day 1/3)  -Hold home aspirin and Plavix for now  -Start appropriate home medications  -Will monitor electrolytes and renal function with BMP and magnesium level in the AM  -Will monitor WBC and Hgb with CBC in the AM  -Clinical course will dictate  further management     DVT Prophylaxis: SCDs; pharmaceutical DVT prophylaxis being held secondary to anemia  GI Prophylaxis: Pantoprazole  Diet:   Diet Order   Procedures    Diet: Liquid, Renal; Clear Liquid; Low Phosphorus; Fluid Consistency: Thin (IDDSI 0)     Dispo: Remain in ICU     Time spent personally reviewing patient's chart, labs and imaging, evaluating/examining the patient, discussing care plan with patient and nurse at bedside and discussing management with consultants (Gastroenterology, Nephrology and Pulmonology): 51 minutes.     Part of this note may be an electronic transcription/translation of spoken language to printed text using the Dragon dictation system.    VTE Prophylaxis:  Mechanical VTE prophylaxis orders are present.      CODE STATUS:   Code Status (Patient has no pulse and is not breathing): CPR (Attempt to Resuscitate)  Medical Interventions (Patient has pulse or is breathing): Limited Support  Medical Intervention Limits: No intubation (DNI)      Electronically signed by Pieter Salgado MD, 6/25/2025, 16:28 EDT.

## 2025-06-25 NOTE — PLAN OF CARE
Goal Outcome Evaluation:         Patient AOx4. NSR at 78. BP currently 134/38. Patient has no c/o pain at this time. Family at bedside.

## 2025-06-25 NOTE — CONSULTS
[facility]   Nephrology Consult Note      Patient Name: Charlette Rai  : 1935  MRN: [id number]  Primary Care Physician:  Brennan Mosqueda MD  Referring Physician: No ref. provider found  Date of admission: 2025    Subjective   Subjective     Reason for Consult/ Chief Complaint: ESRD    HPI:  Charlette Rai is a 89 y.o. female with history of end-stage renal disease, dialysis daily while in rehabitation, I saw her yesterday at [facility] was receiving dialysis.  She was not feeling well.  Having some nausea and intermittent vomiting.  She finished her dialysis yesterday but then later she started again throwing up.  Had some abdominal pain as well, no fever.  Presented to the ER for evaluation and CT scan showed possible severe constipation.  Reported dark emesis.  She underwent EGD earlier today.  I saw her in recovery.  Feeling better.  No nausea at this time.  Abdominal pain is somewhat better.  Denied shortness of breath.  Chart reviewed.  Recent fall with hip fractures, status post repair and currently in rehab.    Review of Systems   All systems were reviewed and negative except for: What is mentioned above.    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 DENIES CP BUT DOES HAVE SOA CHRONIC ISSUE COPD/LUNG CANCER    COPD (chronic obstructive pulmonary disease)     FOLLOWED BY DR MARIAJOSE BAILEY     Coronary artery disease     DENIES CP BUT DOES GET SOA MOST OF THE TIME WITH EXERTION BUT OCC AT REST CHRONIC ISSUE COPD/LUNG CANCER    Deep vein thrombosis     Diabetes mellitus     DOES NOT CHECK BS DAILY    Disease of thyroid gland     HYPOTHYROIDISM    Essential hypertension     Gastric ulcer     GERD (gastroesophageal reflux disease)     Heart murmur     History of transfusion     NO ISSUES POST TRANSFUSION WAS MANY YEARS AGO    Hyperlipemia     Leukocytopenia     FOLLOWED BY DR MIR BAILEY    Limb swelling     Lumbago     Lumbar spinal stenosis     Lung cancer     Migraine headache     Multiple joint pain     On home oxygen therapy     4L/NC PRN    Osteopenia     PNA (pneumonia) 05/04/2024    Pseudomonas pneumonia 04/29/2024    Reflux esophagitis     Shortness of breath     Thyroid nodule     HAS ULTRASOUND YEARLY BEING MONITORED    Vascular disease     Vitamin D deficiency        Past Surgical History:   Procedure Laterality Date    ABDOMINAL SURGERY      ANGIOPLASTY RENAL ARTERY Left 06/14/2024    stent placement    APPENDECTOMY      ARTERIOGRAM N/A 6/14/2024    Procedure: Arteriogram, right radial access, aortogram and renal arteriogram with possible intervention.;  Surgeon: Dio Miguel MD;  Location: [facility] CATH INVASIVE LOCATION;  Service: Peripheral Vascular;  Laterality: N/A;    ARTERIOVENOUS FISTULA/SHUNT SURGERY Left 05/10/2022    Procedure: Left basilic vein transposition;  Surgeon: Wan Barksdale MD;  Location: [facility] MAIN OR;  Service: Vascular;  Laterality: Left;    ARTERIOVENOUS FISTULA/SHUNT SURGERY Left 03/23/2023    Procedure: Ligation of left arm arteriovenous fistula;  Surgeon: Wan Barksdale MD;  Location: [facility] MAIN OR;  Service: Vascular;  Laterality: Left;    ARTERIOVENOUS FISTULA/SHUNT SURGERY Left 11/30/2023    Procedure: Creation of left arm arteriovenous graft;  Surgeon: Wan Barksdale MD;  Location: [facility] MAIN OR;  Service: Vascular;  Laterality: Left;    BRONCHOSCOPY N/A  04/24/2024    Procedure: BRONCHOSCOPY WITH BAL AND WASHINGS;  Surgeon: Khalif Yaenz MD;  Location: [facility] ENDOSCOPY;  Service: Pulmonary;  Laterality: N/A;  MUCUS PLUGGING    CARDIAC CATHETERIZATION  1996    CARDIAC SURGERY      CARDIAC SURGERY      fluid drained from heart    CATARACT EXTRACTION, BILATERAL  2003    COLONOSCOPY  2014    ENDOSCOPY  2016    2019    FEMORAL ARTERY STENT Bilateral     HYSTERECTOMY      LEG THROMBECTOMY/EMBOLECTOMY Left 1/29/2025    Procedure: LEFT LOWER EXTREMITY ANGIOGRAM;  Surgeon: Jaden Castañeda MD;  Location: [facility] HYBRID OR;  Service: Vascular;  Laterality: Left;    LUNG BIOPSY Left 2005    lobectomy upper lung caner    LUNG VOLUME REDUCTION      OTHER SURGICAL HISTORY      artifical joints/limbs    REPLACEMENT TOTAL KNEE Left 2016    SHUNT O GRAM N/A 1/13/2025    Procedure: dialysis shuntogram;  Surgeon: Court Valente MD;  Location: [facility] CATH INVASIVE LOCATION;  Service: Peripheral Vascular;  Laterality: N/A;    UPPER GASTROINTESTINAL ENDOSCOPY         Family History: family history includes Arthritis in her father and mother; Cancer in her brother, brother, brother, and brother; Diabetes in her brother; Other in her brother; Prostate cancer in her brother and brother. Otherwise pertinent FHx was reviewed and not pertinent to current issue.    Social History:  reports that she quit smoking about 21 years ago. Her smoking use included cigarettes. She started smoking about 73 years ago. She has a 52.5 pack-year smoking history. She has been exposed to tobacco smoke. She has never used smokeless tobacco. She reports that she does not currently use alcohol. She reports that she does not use drugs.    Home Medications:  Methoxy PEG-Epoetin Beta, Psyllium, acetaminophen, albuterol sulfate HFA, amLODIPine, aspirin, budesonide, carvedilol, clopidogrel, dexlansoprazole, ferrous gluconate, fluticasone, furosemide, ipratropium-albuterol, isosorbide mononitrate, levocetirizine,  levothyroxine, lidocaine-prilocaine, losartan, rosuvastatin, sevelamer, tobramycin PF, and vitamin D    Allergies:  No Known Allergies    Objective    Objective     Vitals:   Temp:  [97.5 °F (36.4 °C)-99.5 °F (37.5 °C)] 98.1 °F (36.7 °C)  Heart Rate:  [66-91] 75  Resp:  [14-22] 15  BP: (108-168)/() 138/51  Flow (L/min) (Oxygen Therapy):  [3] 3     Physical Exam    Constitutional: Awake, alert, no distress, conversant and pleasant   Eyes: sclerae anicteric, no conjunctival injection   HENT: mucous membranes moist   Neck: Supple, no thyromegaly, no lymphadenopathy, trachea midline, mild JVD   Respiratory: Decreased to auscultation bilaterally, nonlabored respirations    Cardiovascular: RRR, no murmurs, rubs, or gallops.   Gastrointestinal: Positive bowel sounds, soft, nontender, nondistended   Musculoskeletal: No edema on the right side, mild edema on the left side.  Left hip not examined today., no clubbing or cyanosis   Psychiatric: Appropriate affect, cooperative   Neurologic: Oriented x 3, moving all extremities, Cranial Nerves grossly intact, speech clear   Skin: warm and dry, no rashes    Right IJ TDC, site is clean and covered.    Result Review    Result Reviewed:  I have personally reviewed the results from the time of this admission to 6/25/2025 13:28 EDT and agree with these findings:  [x]  Laboratory  []  Microbiology  [x]  Radiology  []  EKG/Telemetry   []  Cardiology/Vascular   []  Pathology  [x]  Old records  []  Other:  LAB RESULTS:    LAB RESULTS:        Lab 06/25/25  0118 06/24/25  1842   SODIUM 134* 133*   POTASSIUM 4.1 4.3   CHLORIDE 93* 92*   CO2 27.6 29.5*   BUN 37.2* 33.0*   CREATININE 2.84* 2.65*   GLUCOSE 101* 121*   EGFR 15.5* 16.9*   ANION GAP 13.4 11.5           Most notable findings include: As above.    CT scan abdomen pelvis compatible with constipation and increased volume.  Distended bladder noted.  Left thigh edema noted.    Assessment & Plan   Assessment / Plan     Brief Patient  Summary:  Charlette Rai is a 89 y.o. female who has end-stage renal disease, here with nausea vomiting and dark emesis.    Active Hospital Problems:  Active Hospital Problems    Diagnosis     **GI bleed    ESRD  Recent hip fracture  Severe anemia  Hypertension  History of renal artery stenosis    Assessment and Plan:   - ESRD, dialysis 3 times a week usually until admission to rehab when she was getting 4 days a week dialysis through right IJ TDC, plan dialysis tomorrow.  Electrolytes are stable, volume status mildly generous.  Ultrafiltration as tolerated.  - Recent fall and hip fracture on the left side, status post repair, was in rehab prior to admission.  - Severe anemia, multifactorial including coffee-ground emesis.  Check iron studies.  Start Epogen with dialysis.  Was on long-acting CASSANDRA as outpatient.  Status post transfusion.  Status post EGD.  GI following.  - Hypertension: Blood pressure is acceptable.  Monitor.  - Secondary hyperparathyroidism and hyperphosphatemia, being addressed with dialysis.  Low phosphorus diet.  Continue sevelamer with meals.  - Constipation, being addressed per primary/GI.  - Severe peripheral arterial disease including lower extremities and renal artery stenosis, status post PTA.  - Lung cancer.  - Type 2 diabetes with complications, per primary.  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    Will follow, please call with any questions!    Thank you very much for this consult!    Electronically signed by Edi García MD, 6/25/2025, 13:28 EDT.  [phone]

## 2025-06-25 NOTE — CONSULTS
[facility]  Initial Inpatient Consult Note    Referring Provider: Hospitalist    Reason for Consultation: Coffee-ground emesis    Subjective     History of present illness:    89 y.o. female with a past medical history of CHF, colon cancer, diabetes, hypothyroid and hypertension presented to the ED due to GI bleed and weakness.  Patient is confused and no family at bedside.  History was obtained through chart review and nursing staff.  Nurse states she presented with coffee-ground emesis as well as being constipated per CT scan.  Patient has been having bowel movements-she had 2 bowel movements in the ER as well as 1 during visit.  Family advised she was having nausea and vomiting where they saw black coffee ground emesis.  Patient does take Plavix and last dose is assumed to be yesterday.  She was given 1 unit of PRBCs.    6/24/2025 CT abdomen/pelvis  1.Large colonic stool burden compatible with the history of constipation. There is some stranding adjacent to the rectosigmoid colon, which could reflect mild stercoral colitis. No bowel obstruction.  2.Distended urinary bladder. Correlate for bladder outlet obstruction.  3.Hydropic gallbladder containing multiple small stones. No definite gallbladder wall thickening or adjacent inflammatory change.  4.Interval development of anasarca. There is some asymmetric skin thickening and subcutaneous edema in the proximal left thigh. Correlate for cellulitis.  5.Trace right pleural effusion, improved from prior.  6.Additional findings as described above.    06/25/2025  Hgb-8.4  HCT-26.7    Past Medical History:  Past Medical History:   Diagnosis Date    Allergic rhinitis     Anaphylactic shock, unspecified, initial encounter 12/05/2023    Anemia     Arthritis     Asthma     USE INHALERS AND NEBULIZERS    Back pain     Bladder disorder     Cancer     LEFT LUNG CANCER 2005 SURGERY AND CHEMO DONE  AND CURRENTLY 4/ 14/22  RIGHT LUNG CANCER HAS ONLY  RECEIVED RADIATION THUS FAR    Chronic kidney disease     stage 3    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 DENIES CP BUT DOES HAVE SOA CHRONIC ISSUE COPD/LUNG CANCER    COPD (chronic obstructive pulmonary disease)     FOLLOWED BY DR MARIAJOSE BAILEY    Coronary artery disease     DENIES CP BUT DOES GET SOA MOST OF THE TIME WITH EXERTION BUT OCC AT REST CHRONIC ISSUE COPD/LUNG CANCER    Deep vein thrombosis     Diabetes mellitus     DOES NOT CHECK BS DAILY    Disease of thyroid gland     HYPOTHYROIDISM    Essential hypertension     Gastric ulcer     GERD (gastroesophageal reflux disease)     Heart murmur     History of transfusion     NO ISSUES POST TRANSFUSION WAS MANY YEARS AGO    Hyperlipemia     Leukocytopenia     FOLLOWED BY DR MIR BAILEY    Limb swelling     Lumbago     Lumbar spinal stenosis     Lung cancer     Migraine headache     Multiple joint pain     On home oxygen therapy     4L/NC PRN    Osteopenia     PNA (pneumonia) 05/04/2024    Pseudomonas pneumonia 04/29/2024    Reflux esophagitis     Shortness of breath     Thyroid nodule     HAS ULTRASOUND YEARLY BEING MONITORED    Vascular disease     Vitamin D deficiency      Past Surgical History:  Past Surgical History:   Procedure Laterality Date    ABDOMINAL SURGERY      ANGIOPLASTY RENAL ARTERY Left 06/14/2024    stent placement    APPENDECTOMY      ARTERIOGRAM N/A 6/14/2024    Procedure: Arteriogram, right radial access, aortogram and renal arteriogram with possible intervention.;  Surgeon: Dio Miguel MD;  Location: [facility] CATH INVASIVE LOCATION;  Service: Peripheral Vascular;  Laterality: N/A;    ARTERIOVENOUS FISTULA/SHUNT SURGERY Left 05/10/2022    Procedure: Left basilic vein transposition;  Surgeon: Wan Barksdale MD;  Location: [facility] MAIN OR;  Service: Vascular;  Laterality: Left;     ARTERIOVENOUS FISTULA/SHUNT SURGERY Left 2023    Procedure: Ligation of left arm arteriovenous fistula;  Surgeon: Wan Barksdale MD;  Location: [facility] MAIN OR;  Service: Vascular;  Laterality: Left;    ARTERIOVENOUS FISTULA/SHUNT SURGERY Left 2023    Procedure: Creation of left arm arteriovenous graft;  Surgeon: Wan Barksdale MD;  Location: [facility] MAIN OR;  Service: Vascular;  Laterality: Left;    BRONCHOSCOPY N/A 2024    Procedure: BRONCHOSCOPY WITH BAL AND WASHINGS;  Surgeon: Khalif Yanez MD;  Location: [facility] ENDOSCOPY;  Service: Pulmonary;  Laterality: N/A;  MUCUS PLUGGING    CARDIAC CATHETERIZATION      CARDIAC SURGERY      CARDIAC SURGERY      fluid drained from heart    CATARACT EXTRACTION, BILATERAL      COLONOSCOPY      ENDOSCOPY      2019    FEMORAL ARTERY STENT Bilateral     HYSTERECTOMY      LEG THROMBECTOMY/EMBOLECTOMY Left 2025    Procedure: LEFT LOWER EXTREMITY ANGIOGRAM;  Surgeon: Jaden Castañeda MD;  Location: [facility] HYBRID OR;  Service: Vascular;  Laterality: Left;    LUNG BIOPSY Left     lobectomy upper lung caner    LUNG VOLUME REDUCTION      OTHER SURGICAL HISTORY      artifical joints/limbs    REPLACEMENT TOTAL KNEE Left     SHUNT O GRAM N/A 2025    Procedure: dialysis shuntogram;  Surgeon: Court Valente MD;  Location: [facility] CATH INVASIVE LOCATION;  Service: Peripheral Vascular;  Laterality: N/A;    UPPER GASTROINTESTINAL ENDOSCOPY        Social History:   Social History     Tobacco Use    Smoking status: Former     Current packs/day: 0.00     Average packs/day: 1 pack/day for 52.5 years (52.5 ttl pk-yrs)     Types: Cigarettes     Start date:      Quit date: 2004     Years since quittin.0     Passive exposure: Past    Smokeless tobacco: Never   Substance Use Topics    Alcohol use: Not Currently     Comment: beer many years ago      Family History:  Family History   Problem Relation Age of Onset    Arthritis Mother      Arthritis Father     Cancer Brother     Diabetes Brother     Other Brother         blood disease     Prostate cancer Brother     Cancer Brother     Prostate cancer Brother     Cancer Brother     Cancer Brother     Malig Hyperthermia Neg Hx     Colon cancer Neg Hx        Home Meds:  Medications Prior to Admission   Medication Sig Dispense Refill Last Dose/Taking    acetaminophen (TYLENOL) 325 MG tablet Take 2 tablets by mouth Every 6 (Six) Hours As Needed for Mild Pain.   6/24/2025    albuterol sulfate  (90 Base) MCG/ACT inhaler Inhale 2 puffs Every 4 (Four) Hours As Needed for Wheezing. 18 g 11 Past Week    amLODIPine (NORVASC) 5 MG tablet Take 1 tablet by mouth Daily. 30 tablet 2 6/24/2025    aspirin 81 MG EC tablet Take 1 tablet by mouth Daily.   6/24/2025    budesonide (Pulmicort) 0.5 MG/2ML nebulizer solution Take 2 mL by nebulization 2 (Two) Times a Day. 60 each 5 Past Week    carvedilol (COREG) 25 MG tablet Take 1 tablet by mouth 2 (Two) Times a Day With Meals. 180 tablet 3 6/24/2025    clopidogrel (Plavix) 75 MG tablet Take 1 tablet by mouth Daily. 90 tablet 3 6/24/2025    dexlansoprazole (DEXILANT) 60 MG capsule Take 1 capsule by mouth Daily. 90 capsule 3 6/24/2025    ferrous gluconate (FERGON) 324 MG tablet Take 1 tablet by mouth 3 (Three) Times a Week. 60 tablet 1 6/24/2025    fluticasone (FLONASE) 50 MCG/ACT nasal spray Administer 1 spray into the nostril(s) as directed by provider Daily.   Past Week    furosemide (LASIX) 20 MG tablet Take 1 tablet by mouth Daily. 90 tablet 3 6/24/2025    ipratropium-albuterol (DUO-NEB) 0.5-2.5 mg/3 ml nebulizer Take 3 mL by nebulization Every 6 (Six) Hours As Needed for Wheezing. 360 mL 2 Past Week    isosorbide mononitrate (IMDUR) 30 MG 24 hr tablet Take 1 tablet by mouth Every Night. 90 tablet 3 6/24/2025    levocetirizine (XYZAL) 5 MG tablet TAKE 1 TABLET DAILY 90 tablet 1 6/24/2025    levothyroxine (SYNTHROID, LEVOTHROID) 50 MCG tablet Take 1 tablet by mouth  Daily.   6/24/2025    lidocaine-prilocaine (EMLA) 2.5-2.5 % cream Apply 1 Application topically to the appropriate area as directed Take As Directed. Apply to dialysis access site 30-45 minutes prior to dialysis   6/24/2025    losartan (Cozaar) 25 MG tablet Take 1 tablet by mouth Every Night. Half tab for one week then 1 tab if SBP is greater than 140 90 tablet 0 6/24/2025    losartan (Cozaar) 50 MG tablet Take 1 tablet by mouth 2 (Two) Times a Day. 60 tablet 5 6/24/2025    Psyllium ([employer] DAILY FIBER PO) Take 2 tablets by mouth Daily.   6/24/2025    rosuvastatin (CRESTOR) 20 MG tablet Take 1 tablet by mouth Every Night. 90 tablet 3 6/24/2025    sevelamer (RENVELA) 800 MG tablet Take 1 tablet by mouth 3 (Three) Times a Day With Meals. 270 tablet 3 6/24/2025    tobramycin PF (JUAN) 300 MG/5ML nebulizer solution Take 5 mL by nebulization 2 (Two) Times a Day. 30 days on 30 days off 300 mL 6 Past Month    vitamin D (ERGOCALCIFEROL) 1.25 MG (60941 UT) capsule capsule Take 1 capsule by mouth Every 14 (Fourteen) Days. (Patient taking differently: Take 1 capsule by mouth Every 14 (Fourteen) Days. The 15th and 30th of the month) 6 capsule 3 6/24/2025    Methoxy PEG-Epoetin Beta (MIRCERA IJ) 200 mcg Every 14 (Fourteen) Days.   Unknown     Current Meds:   [Held by provider] amLODIPine, 5 mg, Oral, Daily  [Held by provider] aspirin, 81 mg, Oral, Daily  budesonide, 0.5 mg, Nebulization, BID - RT  [Held by provider] carvedilol, 25 mg, Oral, BID With Meals  cefTRIAXone, 1,000 mg, Intravenous, Q24H  [Held by provider] clopidogrel, 75 mg, Oral, Daily  [Held by provider] isosorbide mononitrate, 30 mg, Oral, Nightly  levothyroxine, 50 mcg, Oral, QAM AC  [Held by provider] losartan, 25 mg, Oral, Nightly  [Held by provider] losartan, 50 mg, Oral, BID  pantoprazole, 40 mg, Intravenous, BID AC  rosuvastatin, 20 mg, Oral, Nightly  [Held by provider] sevelamer, 800 mg, Oral, TID With Meals  sodium chloride, 10 mL, Intravenous,  Q12H      Allergies:  No Known Allergies  Review of Systems  Pertinent items are noted in HPI     Objective     Vital Signs  Temp:  [98.6 °F (37 °C)-99.5 °F (37.5 °C)] 99.1 °F (37.3 °C)  Heart Rate:  [66-91] 83  Resp:  [14-22] 16  BP: (108-168)/() 161/45  Physical Exam:  General Appearance:    Alert, cooperative, in no acute distress   Head:    Normocephalic, without obvious abnormality, atraumatic   Eyes:          conjunctivae and sclerae normal, no icterus   Throat:   no thrush, oral mucosa moist   Neck:   Supple, no adenopathy   Lungs:     Unlabored breathing     Heart:    Regular rhythm and normal rate    Chest Wall:    No abnormalities observed   Abdomen:     Soft, non distended, slight tenderness in LQ's   Extremities:   no edema, no redness   Skin:   No bruising or rash   Psychiatric:  normal mood and insight     Results Review:   I reviewed the patient's new clinical results.    Results from last 7 days   Lab Units 06/25/25  0636 06/25/25  0117 06/24/25  1842   WBC 10*3/mm3  --  18.98* 14.36*   HEMOGLOBIN g/dL 8.4* 7.1*  7.1* 7.0*   HEMATOCRIT % 26.7* 23.0*  23.3* 22.8*   PLATELETS 10*3/mm3  --  214 196     Results from last 7 days   Lab Units 06/25/25  0118 06/24/25  1842   SODIUM mmol/L 134* 133*   POTASSIUM mmol/L 4.1 4.3   CHLORIDE mmol/L 93* 92*   CO2 mmol/L 27.6 29.5*   BUN mg/dL 37.2* 33.0*   CREATININE mg/dL 2.84* 2.65*   CALCIUM mg/dL 8.5* 8.2*   BILIRUBIN mg/dL 0.5 0.5   ALK PHOS U/L 296* 308*   ALT (SGPT) U/L 19 21   AST (SGOT) U/L 40* 45*   GLUCOSE mg/dL 101* 121*     Results from last 7 days   Lab Units 06/25/25  0117   INR  1.12     Lab Results   Lab Value Date/Time    LIPASE 22 06/24/2025 1842    LIPASE 19 06/07/2025 0244    LIPASE 80 (H) 03/12/2019 2310       Radiology:  XR Chest 1 View  Result Date: 6/24/2025  1.Slight improvement in aeration of the left lung relative to the prior study. 2.Otherwise no significant interval change. Electronically Signed: Weston Castillo MD  6/24/2025  9:31 PM EDT  Workstation ID: [id number]    CT Abdomen Pelvis Without Contrast  Result Date: 6/24/2025  1.Large colonic stool burden compatible with the history of constipation. There is some stranding adjacent to the rectosigmoid colon, which could reflect mild stercoral colitis. No bowel obstruction. 2.Distended urinary bladder. Correlate for bladder outlet obstruction. 3.Hydropic gallbladder containing multiple small stones. No definite gallbladder wall thickening or adjacent inflammatory change. 4.Interval development of anasarca. There is some asymmetric skin thickening and subcutaneous edema in the proximal left thigh. Correlate for cellulitis. 5.Trace right pleural effusion, improved from prior. 6.Additional findings as described above. Electronically Signed: Weston Castillo MD  6/24/2025 9:29 PM EDT  Workstation ID: [id number]    XR Abdomen Flat & Upright  Result Date: 6/24/2025  Impression: 1. Large volume colonic stool consistent with constipation. No evidence of bowel obstruction. 2. Postsurgical changes of the left hemithorax. There is a questionable left pneumothorax. Recommend dedicated chest radiograph. Electronically Signed: Ildefonso Purcell MD  6/24/2025 7:21 PM EDT  Workstation ID: [id number]       Assessment & Plan     GI bleed       Assessment:  GI bleed  Coffee-ground emesis  Constipation    Plan:  Discussed with nursing staff about performing EGD to further evaluate  Continue to keep patient n.p.o. until after procedures performed  Continue Protonix  Continue to monitor H&H  Daughter Sanjuana Peña's person of contact      I discussed the patients findings and my recommendations with patient.    Electronically signed by DAT Casey, 06/25/25, 9:45 AM EDT.    Attending Attestation  I reviewed the below documentation and evaluation and discussed the care plan with DAT Casey, I agree with her findings and plan as documented.    Pt seen and examined by me.  Pt presented with coffee ground  emesis.  Pt reports feeling improved currently.  Abd soft, nd, LUQ TTP.  Will proceed with EGD for further evaluation.  Benefits vs risks of procedure were d/w patient and family (Sanjuana Cornell).  Risks include but are not limited to bleeding, infection, perforation, and risk of sedation.  Family understands risks and agrees to proceed.    Electronically signed by Tanvi Del Valle MD, 06/25/25, 11:03 AM EDT.    Portions of this documentation were transcribed electronically from a voice recognition software.  I confirm all data accurately represents the service(s) I performed at today's visit.

## 2025-06-26 LAB
ALBUMIN SERPL-MCNC: 2.6 G/DL (ref 3.5–5.2)
ALBUMIN/GLOB SERPL: 1 G/DL
ALP SERPL-CCNC: 238 U/L (ref 39–117)
ALT SERPL W P-5'-P-CCNC: 13 U/L (ref 1–33)
ANION GAP SERPL CALCULATED.3IONS-SCNC: 11.8 MMOL/L (ref 5–15)
AST SERPL-CCNC: 30 U/L (ref 1–32)
BH BB BLOOD EXPIRATION DATE: NORMAL
BH BB BLOOD TYPE BARCODE: 5100
BH BB DISPENSE STATUS: NORMAL
BH BB PRODUCT CODE: NORMAL
BH BB UNIT NUMBER: NORMAL
BILIRUB SERPL-MCNC: 0.4 MG/DL (ref 0–1.2)
BUN SERPL-MCNC: 44.3 MG/DL (ref 8–23)
BUN/CREAT SERPL: 12.6 (ref 7–25)
CALCIUM SPEC-SCNC: 8.1 MG/DL (ref 8.6–10.5)
CHLORIDE SERPL-SCNC: 96 MMOL/L (ref 98–107)
CO2 SERPL-SCNC: 26.2 MMOL/L (ref 22–29)
CREAT SERPL-MCNC: 3.51 MG/DL (ref 0.57–1)
CROSSMATCH INTERPRETATION: NORMAL
DEPRECATED RDW RBC AUTO: 66.5 FL (ref 37–54)
EGFRCR SERPLBLD CKD-EPI 2021: 12 ML/MIN/1.73
ERYTHROCYTE [DISTWIDTH] IN BLOOD BY AUTOMATED COUNT: 18 % (ref 12.3–15.4)
FERRITIN SERPL-MCNC: 1567 NG/ML (ref 13–150)
GLOBULIN UR ELPH-MCNC: 2.6 GM/DL
GLUCOSE SERPL-MCNC: 80 MG/DL (ref 65–99)
HBV SURFACE AB SER RIA-ACNC: NORMAL
HBV SURFACE AG SERPL QL IA: NORMAL
HCT VFR BLD AUTO: 26 % (ref 34–46.6)
HGB BLD-MCNC: 7.9 G/DL (ref 12–15.9)
HOLD SPECIMEN: NORMAL
IRON 24H UR-MRATE: 33 MCG/DL (ref 37–145)
IRON SATN MFR SERPL: 25 % (ref 20–50)
MAGNESIUM SERPL-MCNC: 2 MG/DL (ref 1.6–2.4)
MCH RBC QN AUTO: 30.7 PG (ref 26.6–33)
MCHC RBC AUTO-ENTMCNC: 30.4 G/DL (ref 31.5–35.7)
MCV RBC AUTO: 101.2 FL (ref 79–97)
PHOSPHATE SERPL-MCNC: 4 MG/DL (ref 2.5–4.5)
PLATELET # BLD AUTO: 167 10*3/MM3 (ref 140–450)
PMV BLD AUTO: 9.5 FL (ref 6–12)
POTASSIUM SERPL-SCNC: 3.7 MMOL/L (ref 3.5–5.2)
PROT SERPL-MCNC: 5.2 G/DL (ref 6–8.5)
RBC # BLD AUTO: 2.57 10*6/MM3 (ref 3.77–5.28)
SODIUM SERPL-SCNC: 134 MMOL/L (ref 136–145)
TIBC SERPL-MCNC: 131 MCG/DL (ref 298–536)
TRANSFERRIN SERPL-MCNC: 88 MG/DL (ref 200–360)
UNIT  ABO: NORMAL
UNIT  RH: NORMAL
WBC NRBC COR # BLD AUTO: 12.97 10*3/MM3 (ref 3.4–10.8)

## 2025-06-26 PROCEDURE — 25010000002 ONDANSETRON PER 1 MG: Performed by: INTERNAL MEDICINE

## 2025-06-26 PROCEDURE — 99233 SBSQ HOSP IP/OBS HIGH 50: CPT | Performed by: INTERNAL MEDICINE

## 2025-06-26 PROCEDURE — 25010000002 HEPARIN (PORCINE) PER 1000 UNITS: Performed by: INTERNAL MEDICINE

## 2025-06-26 PROCEDURE — 5A1D70Z PERFORMANCE OF URINARY FILTRATION, INTERMITTENT, LESS THAN 6 HOURS PER DAY: ICD-10-PCS | Performed by: INTERNAL MEDICINE

## 2025-06-26 PROCEDURE — 85027 COMPLETE CBC AUTOMATED: CPT | Performed by: INTERNAL MEDICINE

## 2025-06-26 PROCEDURE — 94799 UNLISTED PULMONARY SVC/PX: CPT

## 2025-06-26 PROCEDURE — 80053 COMPREHEN METABOLIC PANEL: CPT | Performed by: INTERNAL MEDICINE

## 2025-06-26 PROCEDURE — 86704 HEP B CORE ANTIBODY TOTAL: CPT | Performed by: INTERNAL MEDICINE

## 2025-06-26 PROCEDURE — 83735 ASSAY OF MAGNESIUM: CPT | Performed by: INTERNAL MEDICINE

## 2025-06-26 PROCEDURE — 82728 ASSAY OF FERRITIN: CPT | Performed by: INTERNAL MEDICINE

## 2025-06-26 PROCEDURE — 94664 DEMO&/EVAL PT USE INHALER: CPT

## 2025-06-26 PROCEDURE — 84100 ASSAY OF PHOSPHORUS: CPT | Performed by: INTERNAL MEDICINE

## 2025-06-26 PROCEDURE — 83540 ASSAY OF IRON: CPT | Performed by: INTERNAL MEDICINE

## 2025-06-26 PROCEDURE — 25010000002 CEFTRIAXONE PER 250 MG: Performed by: INTERNAL MEDICINE

## 2025-06-26 PROCEDURE — 94761 N-INVAS EAR/PLS OXIMETRY MLT: CPT

## 2025-06-26 PROCEDURE — 87340 HEPATITIS B SURFACE AG IA: CPT | Performed by: INTERNAL MEDICINE

## 2025-06-26 PROCEDURE — 84466 ASSAY OF TRANSFERRIN: CPT | Performed by: INTERNAL MEDICINE

## 2025-06-26 PROCEDURE — 86706 HEP B SURFACE ANTIBODY: CPT | Performed by: INTERNAL MEDICINE

## 2025-06-26 RX ORDER — HEPARIN SODIUM 1000 [USP'U]/ML
3200 INJECTION, SOLUTION INTRAVENOUS; SUBCUTANEOUS AS NEEDED
Status: DISCONTINUED | OUTPATIENT
Start: 2025-06-26 | End: 2025-07-03 | Stop reason: HOSPADM

## 2025-06-26 RX ADMIN — CEFTRIAXONE SODIUM 1000 MG: 1 INJECTION, POWDER, FOR SOLUTION INTRAMUSCULAR; INTRAVENOUS at 13:06

## 2025-06-26 RX ADMIN — ONDANSETRON 4 MG: 2 INJECTION INTRAMUSCULAR; INTRAVENOUS at 09:57

## 2025-06-26 RX ADMIN — ROSUVASTATIN CALCIUM 20 MG: 20 TABLET, FILM COATED ORAL at 21:01

## 2025-06-26 RX ADMIN — HEPARIN SODIUM 3200 UNITS: 1000 INJECTION INTRAVENOUS; SUBCUTANEOUS at 11:57

## 2025-06-26 RX ADMIN — MUPIROCIN 1 APPLICATION: 20 OINTMENT TOPICAL at 13:07

## 2025-06-26 RX ADMIN — Medication 10 ML: at 09:00

## 2025-06-26 RX ADMIN — AMLODIPINE BESYLATE 5 MG: 5 TABLET ORAL at 13:06

## 2025-06-26 RX ADMIN — LOSARTAN POTASSIUM 50 MG: 50 TABLET, FILM COATED ORAL at 21:01

## 2025-06-26 RX ADMIN — ACETAMINOPHEN 650 MG: 325 TABLET ORAL at 21:11

## 2025-06-26 RX ADMIN — LEVOTHYROXINE SODIUM 50 MCG: 0.05 TABLET ORAL at 13:07

## 2025-06-26 RX ADMIN — Medication 10 ML: at 21:01

## 2025-06-26 RX ADMIN — BUDESONIDE 0.5 MG: 0.5 SUSPENSION RESPIRATORY (INHALATION) at 06:27

## 2025-06-26 RX ADMIN — MUPIROCIN 1 APPLICATION: 20 OINTMENT TOPICAL at 21:01

## 2025-06-26 RX ADMIN — ACETAMINOPHEN 650 MG: 325 TABLET ORAL at 09:58

## 2025-06-26 RX ADMIN — PANTOPRAZOLE SODIUM 40 MG: 40 INJECTION, POWDER, FOR SOLUTION INTRAVENOUS at 16:56

## 2025-06-26 RX ADMIN — PANTOPRAZOLE SODIUM 40 MG: 40 INJECTION, POWDER, FOR SOLUTION INTRAVENOUS at 13:07

## 2025-06-26 RX ADMIN — BUDESONIDE 0.5 MG: 0.5 SUSPENSION RESPIRATORY (INHALATION) at 20:34

## 2025-06-26 RX ADMIN — ISOSORBIDE MONONITRATE 30 MG: 30 TABLET, EXTENDED RELEASE ORAL at 21:01

## 2025-06-26 NOTE — SIGNIFICANT NOTE
Wound Eval / Progress Noted    MAURICE Escamilla     Patient Name: Charlette Rai  : 1935  MRN: [id number]  Today's Date: 2025                 Admit Date: 2025    Visit Dx:    ICD-10-CM ICD-9-CM   1. Gastrointestinal hemorrhage, unspecified gastrointestinal hemorrhage type  K92.2 578.9         GI bleed    Severe protein-calorie malnutrition        Past Medical History:   Diagnosis Date    Allergic rhinitis     Anaphylactic shock, unspecified, initial encounter 2023    Anemia     Arthritis     Asthma     USE INHALERS AND NEBULIZERS    Back pain     Bladder disorder     Cancer     LEFT LUNG CANCER  SURGERY AND CHEMO DONE  AND CURRENTLY   RIGHT LUNG CANCER HAS ONLY RECEIVED RADIATION THUS FAR    Chronic kidney disease     stage 3    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 DENIES CP BUT DOES HAVE SOA CHRONIC ISSUE COPD/LUNG CANCER    COPD (chronic obstructive pulmonary disease)     FOLLOWED BY DR MARIAJOSE BAILEY    Coronary artery disease     DENIES CP BUT DOES GET SOA MOST OF THE TIME WITH EXERTION BUT OCC AT REST CHRONIC ISSUE COPD/LUNG CANCER    Deep vein thrombosis     Diabetes mellitus     DOES NOT CHECK BS DAILY    Disease of thyroid gland     HYPOTHYROIDISM    Essential hypertension     Gastric ulcer     GERD (gastroesophageal reflux disease)     Heart murmur     History of transfusion     NO ISSUES POST TRANSFUSION WAS MANY YEARS AGO    Hyperlipemia     Leukocytopenia     FOLLOWED BY DR MIR BAILEY    Limb swelling     Lumbago     Lumbar spinal stenosis     Lung cancer     Migraine headache     Multiple joint pain     On home oxygen therapy     4L/NC PRN    Osteopenia     PNA (pneumonia) 2024    Pseudomonas pneumonia 2024    Reflux esophagitis     Shortness of breath     Thyroid nodule     HAS ULTRASOUND YEARLY  BEING MONITORED    Vascular disease     Vitamin D deficiency         Past Surgical History:   Procedure Laterality Date    ABDOMINAL SURGERY      ANGIOPLASTY RENAL ARTERY Left 06/14/2024    stent placement    APPENDECTOMY      ARTERIOGRAM N/A 6/14/2024    Procedure: Arteriogram, right radial access, aortogram and renal arteriogram with possible intervention.;  Surgeon: Dio Miguel MD;  Location: [facility] CATH INVASIVE LOCATION;  Service: Peripheral Vascular;  Laterality: N/A;    ARTERIOVENOUS FISTULA/SHUNT SURGERY Left 05/10/2022    Procedure: Left basilic vein transposition;  Surgeon: Wan Barksdale MD;  Location: [facility] MAIN OR;  Service: Vascular;  Laterality: Left;    ARTERIOVENOUS FISTULA/SHUNT SURGERY Left 03/23/2023    Procedure: Ligation of left arm arteriovenous fistula;  Surgeon: Wan Barksdale MD;  Location: [facility] MAIN OR;  Service: Vascular;  Laterality: Left;    ARTERIOVENOUS FISTULA/SHUNT SURGERY Left 11/30/2023    Procedure: Creation of left arm arteriovenous graft;  Surgeon: Wan Barksdale MD;  Location: [facility] MAIN OR;  Service: Vascular;  Laterality: Left;    BRONCHOSCOPY N/A 04/24/2024    Procedure: BRONCHOSCOPY WITH BAL AND WASHINGS;  Surgeon: Khalif Yanez MD;  Location: [facility] ENDOSCOPY;  Service: Pulmonary;  Laterality: N/A;  MUCUS PLUGGING    CARDIAC CATHETERIZATION  1996    CARDIAC SURGERY      CARDIAC SURGERY      fluid drained from heart    CATARACT EXTRACTION, BILATERAL  2003    COLONOSCOPY  2014    ENDOSCOPY  2016    2019    ENDOSCOPY N/A 6/25/2025    Procedure: ESOPHAGOGASTRODUODENOSCOPY;  Surgeon: Tanvi Del Valle MD;  Location: [facility] ENDOSCOPY;  Service: Gastroenterology;  Laterality: N/A;  gastritis, esophagitis    FEMORAL ARTERY STENT Bilateral     HYSTERECTOMY      LEG THROMBECTOMY/EMBOLECTOMY Left 1/29/2025    Procedure: LEFT LOWER EXTREMITY ANGIOGRAM;  Surgeon: Jaden Castañeda MD;  Location: [facility] HYBRID OR;  Service: Vascular;  Laterality: Left;    LUNG BIOPSY Left  2005    lobectomy upper lung caner    LUNG VOLUME REDUCTION      OTHER SURGICAL HISTORY      artifical joints/limbs    REPLACEMENT TOTAL KNEE Left 2016    SHUNT O GRAM N/A 1/13/2025    Procedure: dialysis shuntogram;  Surgeon: Court Valente MD;  Location: [facility] INVASIVE LOCATION;  Service: Peripheral Vascular;  Laterality: N/A;    UPPER GASTROINTESTINAL ENDOSCOPY           Physical Assessment:  Wound Bilateral midline sacral spine Pressure Injury (Active)   Wound Image   06/26/25 1600   Dressing Appearance open to air 06/26/25 1600   Confirmed Empty Wound Bed Yes, visual inspection of wound bed 06/26/25 1600   Base dry;red;moist;pink 06/26/25 1600   Periwound redness 06/26/25 1600   Drainage Amount none 06/26/25 1600   Care, Wound cleansed with;sterile normal saline 06/26/25 1600   Dressing Care non-adherent;silicone border foam 06/26/25 1600       Wound Left posterior heel Pressure Injury (Active)   Dressing Appearance intact 06/26/25 1600   Dressing Removed Type silicone border foam 06/26/25 1600   Confirmed Empty Wound Bed Yes, visual inspection of wound bed 06/26/25 1600   Base purple 06/26/25 1600   Periwound ecchymotic 06/26/25 1600   Wound Length (cm) 3.2 cm 06/26/25 1600   Wound Width (cm) 4.8 cm 06/26/25 1600   Wound Surface Area (cm^2) 12.06 cm^2 06/26/25 1600   Drainage Amount none 06/26/25 1600   Care, Wound cleansed with;sterile normal saline 06/26/25 1600   Dressing Care silicone border foam 06/26/25 1600       Wound Right posterior heel Pressure Injury (Active)   Pressure Injury Stage DTPI 06/26/25 1600   Dressing Appearance intact 06/26/25 1600   Dressing Removed Type silicone border foam 06/26/25 1600   Confirmed Empty Wound Bed Yes, visual inspection of wound bed 06/26/25 1600   Base maroon/purple;purple 06/26/25 1600   Periwound redness 06/26/25 1600   Wound Length (cm) 1.3 cm 06/26/25 1600   Wound Width (cm) 1.4 cm 06/26/25 1600   Wound Depth (cm) 0 cm 06/26/25 1600   Wound Surface Area  (cm^2) 1.43 cm^2 06/26/25 1600   Wound Volume (cm^3) 0 cm^3 06/26/25 1600   Care, Wound cleansed with;sterile normal saline 06/26/25 1600   Dressing Care non-adherent;silicone border foam 06/26/25 1600       Wound Left lower leg Traumatic Abrasion (Active)   Dressing Appearance intact 06/26/25 1600   Dressing Removed Type non-adherent;silicone border foam 06/26/25 1600   Confirmed Empty Wound Bed Yes, visual inspection of wound bed 06/26/25 1600   Base pink;moist 06/26/25 1600   Periwound dry 06/26/25 1600   Wound Length (cm) 0.8 cm 06/26/25 1600   Wound Width (cm) 3.4 cm 06/26/25 1600   Wound Depth (cm) 0.2 cm 06/26/25 1600   Wound Surface Area (cm^2) 2.14 cm^2 06/26/25 1600   Wound Volume (cm^3) 0.285 cm^3 06/26/25 1600   Drainage Amount none 06/26/25 1600   Care, Wound cleansed with;irrigated with;sterile normal saline 06/26/25 1600   Dressing Care non-adherent;silicone border foam 06/26/25 1600            Wound Check / Follow-up: wound nurse consult for multiple wounds, patient alert resting in bed on 4MTU, recently moved from CCU.     Patient had a recent fall resulting in fracture and repair of LLE and was in rehab prior to admit.     Right heel noted with DTPI , floated and educated patient and family who came to bedside during assessment and patient provided permission to discuss with them.     Left heel bruising noted that extends to medial heel, bogginess is noted to posterior aspect, floated and protective dressing applied to assist with padding.     LLE thigh and knee with multiple suture sites from recent ortho surgery, family stated she was supposed to go see [place] ortho yesterday but cancelled due to hospitalization.     Buttocks: bilateral upper gluteal aspects.. left gluteal aspect noted with rough dry marroon discoloration appears as abrasion, right gluteal aspect with pink moist open area with irregular borders and some purple surrounding - trauma vs pressure. Entire buttocks is red but  remains blanchable.     LLE with traumatic area , appears to be healing but surrounding skin is very dry.       Impression: DTPI to right heel, buttocks with areas noted trauma vs pressure, left heel bruising and bogginess present, LLE with suture site from recent ortho surgery, LLE with trauma area.       Short term goals:  wound care, skin care, repositioning, improve nutrition, float heels.     Katelin Christie RN    6/26/2025    18:36 EDT

## 2025-06-26 NOTE — PROGRESS NOTES
[facility]     Nephrology Progress Note      Patient Name: Charlette Rai  : 1935  MRN: [id number]  Primary Care Physician:  Brennan Mosqueda MD  Date of admission: 2025    Subjective   Subjective     Interval History:  Patient Reports feeling okay.  Seen earlier this morning in intensive care unit while receiving dialysis.  This is a late note entry.  No distress.  Labs reviewed.    Review of Systems   All systems were reviewed and negative except for: Was mentioned above.    Objective   Objective     Vitals:   Temp:  [97.4 °F (36.3 °C)-98.4 °F (36.9 °C)] 97.4 °F (36.3 °C)  Heart Rate:  [71-99] 88  Resp:  [12-19] 15  BP: (120-162)/(38-67) 154/44  Flow (L/min) (Oxygen Therapy):  [1-2] 1  Physical Exam:   Constitutional: Awake, alert, no distress, pale.  Weak and frail.   Eyes: sclerae anicteric, no conjunctival injection   HENT: mucous membranes moist   Neck: Supple, no thyromegaly, no lymphadenopathy, trachea midline, No JVD   Respiratory: Clear to auscultation bilaterally, nonlabored respirations    Cardiovascular: RRR, no murmurs, rubs, or gallops.   Gastrointestinal: Positive bowel sounds, soft, nontender, nondistended   Musculoskeletal: No edema, no clubbing or cyanosis, left leg/thigh pain.   Psychiatric: Appropriate affect, cooperative   Neurologic: Oriented x 3, moving all extremities, Cranial Nerves grossly intact, speech clear   Skin: warm and dry, no rashes   IJ TDC in place.    Result Review    Result Reviewed:  I have personally reviewed the results from the time of this admission to 2025 15:13 EDT and agree with these findings:  [x]  Laboratory  []  Microbiology  [x]  Radiology  []  EKG/Telemetry   []  Cardiology/Vascular   []  Pathology  []  Old records  []  Other:        Lab 25  0246 25  0118 25  1842   SODIUM 134* 134* 133*   POTASSIUM 3.7 4.1 4.3   CHLORIDE 96* 93* 92*   CO2 26.2 27.6 29.5*   BUN 44.3* 37.2* 33.0*   CREATININE 3.51* 2.84* 2.65*    GLUCOSE 80 101* 121*   EGFR 12.0* 15.5* 16.9*   ANION GAP 11.8 13.4 11.5   MAGNESIUM 2.0  --   --    PHOSPHORUS 4.0  --   --            Most notable findings include: As above.    Assessment & Plan   Assessment / Plan       Active Hospital Problems:  Active Hospital Problems    Diagnosis     **GI bleed     Severe protein-calorie malnutrition    ESRD  Recent hip fracture  Severe anemia  Hypertension  History of renal artery stenosis, status post stenting 6/14/2024.    Assessment and Plan:    - ESRD, dialysis 3 times a week while inpatient through right IJ TDC.  Volume status and electrolytes are stable.  Dialysis bath adjusted according to her labs.    - Recent fall and hip fracture on the left side, status post repair, was in rehab prior to admission.  - Severe anemia, multifactorial including coffee-ground emesis.  Iron studies are adequate.  Continue Epogen with dialysis.  Was on long-acting CASSANDRA as outpatient.  Status post transfusion.  Status post EGD.  GI following.  - Hypertension: Blood pressure is acceptable.  Monitor.  - Secondary hyperparathyroidism and hyperphosphatemia, being addressed with dialysis.  Low phosphorus diet.  Continue sevelamer with meals.  - Constipation, being addressed per primary/GI.  - Severe peripheral arterial disease including lower extremities and renal artery stenosis, status post PTA and stent 6/14/2024.  Discussed with ICU team, given GI bleed, Plavix will be held for now.  - Lung cancer.  - Type 2 diabetes with complications, per primary.  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    Discussed with ICU team and dialysis staff.    Will follow, please call with any questions!    Electronically signed by Edi García MD, 6/26/2025, 15:13 EDT.    [phone]

## 2025-06-26 NOTE — PROGRESS NOTES
[facility]  Inpatient Progress Note    Reason for Follow Up: Coffee-ground emesis    Subjective     Interval History:   Patient feeling much better.  Currently doing dialysis.  Patient had bowel movements yesterday that were dark in color but not black.  Patient is complaining of tenderness in her lower abdomen, but no nausea or vomiting.    EGD-6/25/2025  Findings included-  LA grade a esophagitis.  Gastritis.  A Billroth I anastomosis was found.  Normal duodenum.    Current Facility-Administered Medications:     acetaminophen (TYLENOL) tablet 650 mg, 650 mg, Oral, Q6H PRN, Tanvi Del Valle MD    amLODIPine (NORVASC) tablet 5 mg, 5 mg, Oral, Daily, Pieter Salgado MD    [Held by provider] aspirin EC tablet 81 mg, 81 mg, Oral, Daily, Tanvi Del Valle MD    budesonide (PULMICORT) nebulizer solution 0.5 mg, 0.5 mg, Nebulization, BID - RT, Tanvi Del Valle MD, 0.5 mg at 06/26/25 0627    [Held by provider] carvedilol (COREG) tablet 25 mg, 25 mg, Oral, BID With Meals, Tanvi Del Valle MD    cefTRIAXone (ROCEPHIN) 1,000 mg in sodium chloride 0.9 % 100 mL IVPB-VTB, 1,000 mg, Intravenous, Q24H, Tanvi Del Valle MD, Last Rate: 200 mL/hr at 06/25/25 1401, 1,000 mg at 06/25/25 1401    [Held by provider] clopidogrel (PLAVIX) tablet 75 mg, 75 mg, Oral, Daily, Tanvi Del Valle MD    epoetin mita (EPOGEN,PROCRIT) injection 10,000 Units, 10,000 Units, Intravenous, Once per day on Monday Wednesday Friday, Edi García MD    heparin (porcine) injection 3,200 Units, 3,200 Units, Intracatheter, PRN, Edi García MD    hydrALAZINE (APRESOLINE) injection 10 mg, 10 mg, Intravenous, Q6H PRN, Tanvi Del Valle MD    ipratropium-albuterol (DUO-NEB) nebulizer solution 3 mL, 3 mL, Nebulization, Q6H PRN, Tanvi Del Valle MD    isosorbide mononitrate (IMDUR) 24 hr tablet 30 mg, 30 mg, Oral, Nightly, Pieter Salgado MD, 30 mg at 06/25/25 8546     levothyroxine (SYNTHROID, LEVOTHROID) tablet 50 mcg, 50 mcg, Oral, QAM AC, Tanvi Del Valle MD    losartan (COZAAR) tablet 50 mg, 50 mg, Oral, BID, Pieter Salgado MD, 50 mg at 06/25/25 2125    mupirocin (BACTROBAN) 2 % nasal ointment 1 Application, 1 Application, Each Nare, BID, Pieter Salgado MD, 1 Application at 06/25/25 2125    nitroglycerin (NITROSTAT) SL tablet 0.4 mg, 0.4 mg, Sublingual, Q5 Min PRN, Tanvi Del Valle MD    ondansetron (ZOFRAN) injection 4 mg, 4 mg, Intravenous, Q6H PRN, Tanvi Del Valle MD    pantoprazole (PROTONIX) injection 40 mg, 40 mg, Intravenous, BID AC, Tanvi Del Valle MD, 40 mg at 06/25/25 1744    rosuvastatin (CRESTOR) tablet 20 mg, 20 mg, Oral, Nightly, Tanvi Del Valle MD, 20 mg at 06/25/25 2125    [Held by provider] sevelamer (RENVELA) tablet 800 mg, 800 mg, Oral, TID With Meals, Tanvi Del Valle MD    sodium chloride 0.9 % flush 10 mL, 10 mL, Intravenous, Q12H, Tanvi Del Valle MD, 10 mL at 06/25/25 2125    sodium chloride 0.9 % flush 10 mL, 10 mL, Intravenous, PRN, Tanvi Del Valle MD    sodium chloride 0.9 % infusion 40 mL, 40 mL, Intravenous, PRN, Tanvi Del Valle MD  Review of Systems:    Pertinent items are noted in HPI    Objective     Vital Signs  Temp:  [97.5 °F (36.4 °C)-98.4 °F (36.9 °C)] 97.5 °F (36.4 °C)  Heart Rate:  [71-99] 85  Resp:  [12-19] 17  BP: (121-162)/() 138/48  Body mass index is 23.77 kg/m².    Intake/Output Summary (Last 24 hours) at 6/26/2025 0950  Last data filed at 6/26/2025 0800  Gross per 24 hour   Intake 0 ml   Output 275 ml   Net -275 ml     No intake/output data recorded.     Physical Exam:   General: awake, alert and in no acute distress   Eyes: eyes move symmetrical in all directions, no scleral icterus   Neck: supple, trachea is midline   Skin: warm and dry, not jaundiced   Cardiovascular: regular rhythm and rate   Pulm:  breathing unlabored   Abdomen: soft, tender in LQ's,  non distended   Rectal: deferred   Extremities: no rash or edema   Psychiatric: mental status within normal limits, alert and oriented      Results Review:     I reviewed the patient's new clinical results.    Results from last 7 days   Lab Units 06/26/25  0246 06/25/25  0636 06/25/25  0117 06/24/25  1842   WBC 10*3/mm3 12.97*  --  18.98* 14.36*   HEMOGLOBIN g/dL 7.9* 8.4* 7.1*  7.1* 7.0*   HEMATOCRIT % 26.0* 26.7* 23.0*  23.3* 22.8*   PLATELETS 10*3/mm3 167  --  214 196     Results from last 7 days   Lab Units 06/26/25  0246 06/25/25  0118 06/24/25  1842   SODIUM mmol/L 134* 134* 133*   POTASSIUM mmol/L 3.7 4.1 4.3   CHLORIDE mmol/L 96* 93* 92*   CO2 mmol/L 26.2 27.6 29.5*   BUN mg/dL 44.3* 37.2* 33.0*   CREATININE mg/dL 3.51* 2.84* 2.65*   CALCIUM mg/dL 8.1* 8.5* 8.2*   BILIRUBIN mg/dL 0.4 0.5 0.5   ALK PHOS U/L 238* 296* 308*   ALT (SGPT) U/L 13 19 21   AST (SGOT) U/L 30 40* 45*   GLUCOSE mg/dL 80 101* 121*     Results from last 7 days   Lab Units 06/25/25  0117   INR  1.12     Lab Results   Lab Value Date/Time    LIPASE 22 06/24/2025 1842    LIPASE 19 06/07/2025 0244    LIPASE 80 (H) 03/12/2019 2310       Radiology:    XR Chest 1 View  Result Date: 6/24/2025  1.Slight improvement in aeration of the left lung relative to the prior study. 2.Otherwise no significant interval change. Electronically Signed: Weston Castillo MD  6/24/2025 9:31 PM EDT  Workstation ID: [id number]    CT Abdomen Pelvis Without Contrast  Result Date: 6/24/2025  1.Large colonic stool burden compatible with the history of constipation. There is some stranding adjacent to the rectosigmoid colon, which could reflect mild stercoral colitis. No bowel obstruction. 2.Distended urinary bladder. Correlate for bladder outlet obstruction. 3.Hydropic gallbladder containing multiple small stones. No definite gallbladder wall thickening or adjacent inflammatory change. 4.Interval development of anasarca. There is some asymmetric skin thickening and  subcutaneous edema in the proximal left thigh. Correlate for cellulitis. 5.Trace right pleural effusion, improved from prior. 6.Additional findings as described above. Electronically Signed: Weston Castillo MD  6/24/2025 9:29 PM EDT  Workstation ID: [id number]    XR Abdomen Flat & Upright  Result Date: 6/24/2025  Impression: 1. Large volume colonic stool consistent with constipation. No evidence of bowel obstruction. 2. Postsurgical changes of the left hemithorax. There is a questionable left pneumothorax. Recommend dedicated chest radiograph. Electronically Signed: Ildefonso Purcell MD  6/24/2025 7:21 PM EDT  Workstation ID: [id number]        Assessment & Plan   Assessment:   GI bleed  Coffee-ground emesis  Constipation      Plan:   Patient's feeling much better currently receiving dialysis  Patient needs to continue Protonix  Continue to monitor H&H  Will continue to defer colonoscopy unless drop in hemoglobin or increase in abdominal pain  Patient understands and agrees to the plan    I discussed the patients findings and my recommendations with patient.      Electronically signed by DAT Casey, 6/26/2025, 09:50 EDT.

## 2025-06-26 NOTE — PROGRESS NOTES
[facility]   Hospitalist Progress Note  Date: 2025  Patient Name: Charlette Rai  : 1935  MRN: [id number]  Date of admission: 2025  Consultants:   -Gastroenterology: Dr. Tanvi Del Valle  -Nephrology: Dr. Edi García  -Pulmonology: Dr. Zbigniew Duarte    Subjective   Subjective     Chief Complaint: Hematemesis and generalized weakness    Summary:   Charlette Rai is a 89 y.o. female with ESRD on HD, CHF, colon cancer, type 2 diabetes mellitus, hypothyroidism and hypertension presented to ED with complaints of hematemesis and worsening weakness.  Patient found to be anemic in the ED.  CT abdomen pelvis obtained that showed large colonic stool burden.  Patient admitted to ICU level of care.  Pulmonology, gastroenterology and nephrology consulted.  Patient transfused 1 unit PRBCs.  Patient underwent EGD on 2025 that showed esophagitis and gastritis.  Ceftriaxone started for UTI.    Interval Followup:   No acute issues overnight.  Patient did have bowel movements but hemoglobin has been stable.  Patient states that she just feels tired this morning.    Procedures:   - EGD (2025)    Antibiotics:   - Ceftriaxone    Objective   Objective     Vitals:   Temp:  [97.5 °F (36.4 °C)-98.4 °F (36.9 °C)] 97.5 °F (36.4 °C)  Heart Rate:  [71-99] 85  Resp:  [12-19] 18  BP: (120-162)/() 120/51  Flow (L/min) (Oxygen Therapy):  [1-3] 1  Physical Exam   Gen: Sitting in bed on dialysis, chronically ill-appearing elderly female, pleasant  Resp: Equal chest rise bilaterally, good aeration  Card: RRR, No m/r/g  Abd: Soft, Nontender, Nondistended, + bowel sounds    Result Review    Result Review:  I have personally reviewed the results as below and agree with these findings:  []  Laboratory:   CMP          2025    18:42 2025    01:18 2025    02:46   CMP   Glucose 121  101  80    BUN 33.0  37.2  44.3    Creatinine 2.65  2.84  3.51    EGFR 16.9  15.5  12.0    Sodium 133  134  134     Potassium 4.3  4.1  3.7    Chloride 92  93  96    Calcium 8.2  8.5  8.1    Total Protein 5.8  5.9  5.2    Albumin 3.3  3.0  2.6    Globulin 2.5  2.9  2.6    Total Bilirubin 0.5  0.5  0.4    Alkaline Phosphatase 308  296  238    AST (SGOT) 45  40  30    ALT (SGPT) 21  19  13    Albumin/Globulin Ratio 1.3  1.0  1.0    BUN/Creatinine Ratio 12.5  13.1  12.6    Anion Gap 11.5  13.4  11.8      CBC          6/24/2025    18:42 6/25/2025    01:17 6/25/2025    06:36 6/26/2025    02:46   CBC   WBC 14.36  18.98   12.97    RBC 2.25  2.25   2.57    Hemoglobin 7.0  7.1     7.1  8.4  7.9    Hematocrit 22.8  23.0     23.3  26.7  26.0    .3  102.2   101.2    MCH 31.1  31.6   30.7    MCHC 30.7  30.9   30.4    RDW 17.6  17.8   18.0    Platelets 196  214   167    Phosphorus and magnesium within normal limits  []  Microbiology:   []  Radiology:   [x]  EKG/Telemetry:    []  Cardiology/Vascular:    []  Pathology:  []  Old records:  []  Other:    Assessment & Plan   Assessment / Plan     Assessment:  GI bleed  Coffee-ground emesis  Acute anemia secondary to GI bleed  ESRD on hemodialysis  Urinary tract infection due to unknown infectious organism  Severe protein calorie malnutrition  Constipation  Hypertension  History of renal artery stenosis  Chronic diastolic heart failure, not acutely exacerbated  Hypothyroidism    Plan:  -Gastroenterology, Nephrology and Pulmonology consulted and following, appreciate assistance and recommendations in the care of this patient.  - Management discussed with gastroenterology.  Continue to monitor hemoglobin.  No plan for colonoscopy at this time.  -Ceftriaxone started.  Continue for total 3 days (day 2/3)  -Resume home aspirin.  Plan to start home Plavix tomorrow (06/27/2025 after discussion with Gastroenterology  -Continue appropriate home medications  -Continue holding carvedilol since blood pressure has been low normal  -Monitor electrolytes and renal function with BMP and magnesium level in  the AM  -Monitor WBC and Hgb with CBC in the AM  -Clinical course will dictate further management     DVT Prophylaxis: SCDs; pharmaceutical DVT prophylaxis being held secondary to anemia  GI Prophylaxis: Pantoprazole  Diet:   Diet Order   Procedures    Diet: Liquid, Renal; Clear Liquid; Low Phosphorus; Fluid Consistency: Thin (IDDSI 0)     Dispo: Transfer to monitored bed     Time spent personally reviewing patient's chart, labs and imaging, evaluating/examining the patient, discussing care plan with patient and nurse at bedside and discussing management with consultants (Gastroenterology, Nephrology and Pulmonology): 51 minutes.     Part of this note may be an electronic transcription/translation of spoken language to printed text using the Dragon dictation system.    VTE Prophylaxis:  Pharmacologic & mechanical VTE prophylaxis orders are present.      CODE STATUS:   Code Status (Patient has no pulse and is not breathing): CPR (Attempt to Resuscitate)  Medical Interventions (Patient has pulse or is breathing): Limited Support  Medical Intervention Limits: No intubation (DNI)      Electronically signed by Pieter Salgado MD, 6/26/2025, 10:47 EDT.

## 2025-06-26 NOTE — PLAN OF CARE
Goal Outcome Evaluation:      Patient Aox4, VSS, q2turn. Wound care treated patient today. Diet advanced, tolerating well. No complaints of pain or N/V. Family at bedside.

## 2025-06-26 NOTE — NURSING NOTE
Question Answer   Duration of Treatment 3.5 Hours   Access Site Tunneled Dialysis Catheter   Dialyzer Revaclear    mL/min   Dialysate Temperature (C) 36   BFR-As tolerated to a maximum of: 400 mL/min   Prime Dialyzer With NS to Priming Volume? Yes   Dialysate Solution Bath: K+ = 4 mEq, Ca = 2.5mEq   Bicarb Other   Bicarb Comments 36   Na+ 137 meq   Fluid Removal: 2.5kg   Notify [facility] of order. Who did you speak with? Ela doing dialysis right now.     Patient tolerated dialysis treatment well using right tunneled dialysis catheter, dressing changed, hep locked post treatment. Left avf needs declot per patient and family. Removed 2.2 liters of 2.5 liters ordered due to stomach cramping at end. BVP 84.0. Last blood pressure 152/53. Report given to Sera KHAN.

## 2025-06-26 NOTE — PROGRESS NOTES
"Intensive Care Consult Note     GI bleed    History of Present Illness     89-year-old female with past medical history of HFpEF, hypertension, GERD, hyperlipidemia, ESRD on dialysis, COPD, hypothyroidism, type 2 diabetes, and malignant neoplasm of upper lobe of right lung presented to the ED with abdominal pain and dark emesis.  Patient reports she has not had a bowel movement in 5 days.  Initial labs showed WBC 14.36, hemoglobin 7.  CT of abdomen pelvis showed large colonic stool burden compatible with history of constipation, there are some stranding adjacent to the rectosigmoid colon which could reflect mild stercoral colitis, distended urinary bladder hydropic, gallbladder containing multiple small stones, and anasarca.  She was given 1 L normal saline bolus, Protonix and 1 unit PRBC.      Overnight events/subjective: No acute events overnight.  EGD yesterday showed esophagitis and gastritis.  Hemoglobin stable at 7.9.  On 2 L nasal cannula.          Physical Exam and Clinical Information   /62 (BP Location: Right arm, Patient Position: Lying)   Pulse 88   Temp 97.5 °F (36.4 °C) (Oral)   Resp 14   Ht 165.1 cm (65\")   Wt 64.8 kg (142 lb 13.7 oz)   LMP  (LMP Unknown)   SpO2 100%   BMI 23.77 kg/m²     Physical exam  General : Aox2. In no acute distress. Well developed  Neuro: CN 2-12 grossly intact, moves bilateral upper and lower extremities.  HEENT : AT,NC,PERRLA,No pallor, No Icterus,No thyromegaly. No JVD.  CVS : RRR, No M/R/G. B/l UE and LE palpable pulses +  Resp/Chest: B/l ant VBS+. No wheeze/rales, 2 L nasal cannula  Abd: Soft, Non distended, No tenderness, No organomegaly. Bowel sounds +  EXT: NO edema.   SKIN : NO palpable skin lesions/rashes noted.    Results from last 7 days   Lab Units 06/26/25  0246 06/25/25  0636 06/25/25  0117 06/24/25  1842   WBC 10*3/mm3 12.97*  --  18.98* 14.36*   HEMOGLOBIN g/dL 7.9* 8.4* 7.1*  7.1* 7.0*   PLATELETS 10*3/mm3 167  --  214 196     Results from last " 7 days   Lab Units 06/26/25  0246 06/25/25  0118 06/24/25  1842   SODIUM mmol/L 134* 134* 133*   POTASSIUM mmol/L 3.7 4.1 4.3   CO2 mmol/L 26.2 27.6 29.5*   BUN mg/dL 44.3* 37.2* 33.0*   CREATININE mg/dL 3.51* 2.84* 2.65*   MAGNESIUM mg/dL 2.0  --   --    PHOSPHORUS mg/dL 4.0  --   --    GLUCOSE mg/dL 80 101* 121*     Estimated Creatinine Clearance: 11.3 mL/min (A) (by C-G formula based on SCr of 3.51 mg/dL (H)).          Lab Results   Component Value Date    LACTATE 1.1 06/24/2025        Images:     I reviewed the patient's results and images.     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  Not requiring pressors.    Pulmonary: On 2 L nasal cannula.  Continue Brovana, Pulmicort, DuoNebs.  Wean FiO2 to maintain SpO2 greater than 90%.      Renal: ESRD on HD, nephrology following, dialysis per them.  Trend renal function and electrolytes, replace electrolytes as needed.  Monitor I's and O's.      GI/Liver: No further hematemesis.  N.p.o. gastroenterology following, EGD on 6/25 positive for gastritis and esophagitis. Diet per GI. Hb stable.  On aspirin and Plavix for renal artery stent on 6/14/2024. Continue ASA, hold Plavix for at least 4 weeks until gastritis resolved.      ID: Elevated WBC, will do ceftriaxone for 3 days.concern for UTI      Hem-onc: Has had no further episodes of vomiting or diarrhea.  Hemoglobin stable at 7.9.  Continue to trend H&H.  Transfuse for hemoglobin less than 7.      Endocrine: Continue home Synthroid.  Type 2 diabetes, not insulin-dependent.  Monitor blood glucose.          Teagan CASTILLO APRN spent 10 minutes of critical care  time.   Electronically signed by DAT Brannon, 06/26/25, 12:08 PM EDT.            Zbigniew Duarte MD   Critical Care Medicine  06/26/25 10:21 EDT

## 2025-06-27 ENCOUNTER — APPOINTMENT (OUTPATIENT)
Facility: HOSPITAL | Age: 89
End: 2025-06-27
Payer: MEDICARE

## 2025-06-27 LAB
027 TOXIN: NORMAL
ALBUMIN SERPL-MCNC: 2.5 G/DL (ref 3.5–5.2)
ALBUMIN/GLOB SERPL: 0.8 G/DL
ALP SERPL-CCNC: 276 U/L (ref 39–117)
ALT SERPL W P-5'-P-CCNC: 26 U/L (ref 1–33)
ANION GAP SERPL CALCULATED.3IONS-SCNC: 14.9 MMOL/L (ref 5–15)
AST SERPL-CCNC: 57 U/L (ref 1–32)
BILIRUB SERPL-MCNC: 0.3 MG/DL (ref 0–1.2)
BUN SERPL-MCNC: 24.9 MG/DL (ref 8–23)
BUN/CREAT SERPL: 9.7 (ref 7–25)
C DIFF TOX GENS STL QL NAA+PROBE: NEGATIVE
CALCIUM SPEC-SCNC: 8 MG/DL (ref 8.6–10.5)
CHLORIDE SERPL-SCNC: 98 MMOL/L (ref 98–107)
CO2 SERPL-SCNC: 20.1 MMOL/L (ref 22–29)
CREAT SERPL-MCNC: 2.56 MG/DL (ref 0.57–1)
DEPRECATED RDW RBC AUTO: 72.8 FL (ref 37–54)
DEVICE COMMENT 2: ABNORMAL
DEVICE COMMENT: ABNORMAL
EGFRCR SERPLBLD CKD-EPI 2021: 17.6 ML/MIN/1.73
ERYTHROCYTE [DISTWIDTH] IN BLOOD BY AUTOMATED COUNT: 18.1 % (ref 12.3–15.4)
GLOBULIN UR ELPH-MCNC: 3 GM/DL
GLUCOSE BLDC GLUCOMTR-MCNC: 114 MG/DL (ref 70–99)
GLUCOSE BLDC GLUCOMTR-MCNC: 48 MG/DL (ref 70–99)
GLUCOSE BLDC GLUCOMTR-MCNC: 53 MG/DL (ref 70–99)
GLUCOSE BLDC GLUCOMTR-MCNC: 76 MG/DL (ref 70–99)
GLUCOSE BLDC GLUCOMTR-MCNC: 82 MG/DL (ref 70–99)
GLUCOSE SERPL-MCNC: 46 MG/DL (ref 65–99)
HBV CORE AB SERPL QL IA: NEGATIVE
HCT VFR BLD AUTO: 31 % (ref 34–46.6)
HGB BLD-MCNC: 9 G/DL (ref 12–15.9)
MAGNESIUM SERPL-MCNC: 2 MG/DL (ref 1.6–2.4)
MCH RBC QN AUTO: 31.6 PG (ref 26.6–33)
MCHC RBC AUTO-ENTMCNC: 29 G/DL (ref 31.5–35.7)
MCV RBC AUTO: 108.8 FL (ref 79–97)
PHOSPHATE SERPL-MCNC: 3.8 MG/DL (ref 2.5–4.5)
PLATELET # BLD AUTO: 179 10*3/MM3 (ref 140–450)
PMV BLD AUTO: 9.4 FL (ref 6–12)
POTASSIUM SERPL-SCNC: 4.5 MMOL/L (ref 3.5–5.2)
PROT SERPL-MCNC: 5.5 G/DL (ref 6–8.5)
RBC # BLD AUTO: 2.85 10*6/MM3 (ref 3.77–5.28)
SODIUM SERPL-SCNC: 133 MMOL/L (ref 136–145)
WBC NRBC COR # BLD AUTO: 13.5 10*3/MM3 (ref 3.4–10.8)

## 2025-06-27 PROCEDURE — 99233 SBSQ HOSP IP/OBS HIGH 50: CPT | Performed by: INTERNAL MEDICINE

## 2025-06-27 PROCEDURE — 25010000002 HEPARIN (PORCINE) PER 1000 UNITS: Performed by: INTERNAL MEDICINE

## 2025-06-27 PROCEDURE — 80053 COMPREHEN METABOLIC PANEL: CPT

## 2025-06-27 PROCEDURE — 94664 DEMO&/EVAL PT USE INHALER: CPT

## 2025-06-27 PROCEDURE — 94799 UNLISTED PULMONARY SVC/PX: CPT

## 2025-06-27 PROCEDURE — 93970 EXTREMITY STUDY: CPT

## 2025-06-27 PROCEDURE — 93970 EXTREMITY STUDY: CPT | Performed by: SURGERY

## 2025-06-27 PROCEDURE — 83735 ASSAY OF MAGNESIUM: CPT

## 2025-06-27 PROCEDURE — 25010000002 EPOETIN ALFA PER 1000 UNITS: Performed by: INTERNAL MEDICINE

## 2025-06-27 PROCEDURE — 87493 C DIFF AMPLIFIED PROBE: CPT | Performed by: INTERNAL MEDICINE

## 2025-06-27 PROCEDURE — 94760 N-INVAS EAR/PLS OXIMETRY 1: CPT

## 2025-06-27 PROCEDURE — 82948 REAGENT STRIP/BLOOD GLUCOSE: CPT

## 2025-06-27 PROCEDURE — 97161 PT EVAL LOW COMPLEX 20 MIN: CPT

## 2025-06-27 PROCEDURE — 84100 ASSAY OF PHOSPHORUS: CPT

## 2025-06-27 PROCEDURE — 82948 REAGENT STRIP/BLOOD GLUCOSE: CPT | Performed by: INTERNAL MEDICINE

## 2025-06-27 PROCEDURE — 85027 COMPLETE CBC AUTOMATED: CPT

## 2025-06-27 PROCEDURE — 97165 OT EVAL LOW COMPLEX 30 MIN: CPT

## 2025-06-27 RX ORDER — HEPARIN SODIUM 5000 [USP'U]/ML
5000 INJECTION, SOLUTION INTRAVENOUS; SUBCUTANEOUS EVERY 8 HOURS SCHEDULED
Status: DISCONTINUED | OUTPATIENT
Start: 2025-06-27 | End: 2025-07-03 | Stop reason: HOSPADM

## 2025-06-27 RX ORDER — IBUPROFEN 600 MG/1
1 TABLET ORAL
Status: DISCONTINUED | OUTPATIENT
Start: 2025-06-27 | End: 2025-07-03 | Stop reason: HOSPADM

## 2025-06-27 RX ORDER — ROSUVASTATIN CALCIUM 5 MG/1
10 TABLET, COATED ORAL NIGHTLY
Status: DISCONTINUED | OUTPATIENT
Start: 2025-06-27 | End: 2025-07-03 | Stop reason: HOSPADM

## 2025-06-27 RX ORDER — NICOTINE POLACRILEX 4 MG
15 LOZENGE BUCCAL
Status: DISCONTINUED | OUTPATIENT
Start: 2025-06-27 | End: 2025-07-03 | Stop reason: HOSPADM

## 2025-06-27 RX ORDER — DEXTROSE MONOHYDRATE 25 G/50ML
25 INJECTION, SOLUTION INTRAVENOUS
Status: DISCONTINUED | OUTPATIENT
Start: 2025-06-27 | End: 2025-07-03 | Stop reason: HOSPADM

## 2025-06-27 RX ADMIN — ISOSORBIDE MONONITRATE 30 MG: 30 TABLET, EXTENDED RELEASE ORAL at 21:24

## 2025-06-27 RX ADMIN — LEVOTHYROXINE SODIUM 50 MCG: 0.05 TABLET ORAL at 07:54

## 2025-06-27 RX ADMIN — BUDESONIDE 0.5 MG: 0.5 SUSPENSION RESPIRATORY (INHALATION) at 19:14

## 2025-06-27 RX ADMIN — LOSARTAN POTASSIUM 50 MG: 50 TABLET, FILM COATED ORAL at 08:53

## 2025-06-27 RX ADMIN — PANTOPRAZOLE SODIUM 40 MG: 40 INJECTION, POWDER, FOR SOLUTION INTRAVENOUS at 17:00

## 2025-06-27 RX ADMIN — ERYTHROPOIETIN 10000 UNITS: 10000 INJECTION, SOLUTION INTRAVENOUS; SUBCUTANEOUS at 08:53

## 2025-06-27 RX ADMIN — LOSARTAN POTASSIUM 50 MG: 50 TABLET, FILM COATED ORAL at 21:24

## 2025-06-27 RX ADMIN — CARVEDILOL 25 MG: 25 TABLET, FILM COATED ORAL at 17:00

## 2025-06-27 RX ADMIN — BUDESONIDE 0.5 MG: 0.5 SUSPENSION RESPIRATORY (INHALATION) at 07:06

## 2025-06-27 RX ADMIN — ROSUVASTATIN CALCIUM 10 MG: 5 TABLET, FILM COATED ORAL at 21:24

## 2025-06-27 RX ADMIN — IPRATROPIUM BROMIDE AND ALBUTEROL SULFATE 3 ML: .5; 3 SOLUTION RESPIRATORY (INHALATION) at 14:53

## 2025-06-27 RX ADMIN — Medication 10 ML: at 21:24

## 2025-06-27 RX ADMIN — MUPIROCIN 1 APPLICATION: 20 OINTMENT TOPICAL at 08:53

## 2025-06-27 RX ADMIN — ASPIRIN 81 MG: 81 TABLET, COATED ORAL at 08:53

## 2025-06-27 RX ADMIN — Medication 10 ML: at 08:53

## 2025-06-27 RX ADMIN — HEPARIN SODIUM 5000 UNITS: 5000 INJECTION INTRAVENOUS; SUBCUTANEOUS at 21:24

## 2025-06-27 RX ADMIN — MUPIROCIN 1 APPLICATION: 20 OINTMENT TOPICAL at 21:24

## 2025-06-27 RX ADMIN — PANTOPRAZOLE SODIUM 40 MG: 40 INJECTION, POWDER, FOR SOLUTION INTRAVENOUS at 07:54

## 2025-06-27 NOTE — PROGRESS NOTES
[facility]   Hospitalist Progress Note  Date: 2025  Patient Name: Charlette Rai  : 1935  MRN: [id number]  Date of admission: 2025  Consultants:   -Gastroenterology: Dr. Tanvi Del Valle  -Nephrology: Dr. Edi García  -Pulmonology: Dr. Zbigniew Duarte    Subjective   Subjective     Chief Complaint: Hematemesis and generalized weakness    Summary:   Charlette Rai is a 89 y.o. female with ESRD on HD, CHF, colon cancer, type 2 diabetes mellitus, hypothyroidism and hypertension presented to ED with complaints of hematemesis and worsening weakness.  Patient found to be anemic in the ED.  CT abdomen pelvis obtained that showed large colonic stool burden.  Patient admitted to ICU level of care.  Pulmonology, gastroenterology and nephrology consulted.  Patient transfused 1 unit PRBCs.  Patient underwent EGD on 2025 that showed esophagitis and gastritis.  Ceftriaxone started for UTI.    Interval Followup:   Patient says she is not feeling well this morning.  Patient has complaints of diarrhea.  Denied any active chest pain.  Shortness of breath unchanged.    Procedures:   - EGD (2025)    Antibiotics:   - Ceftriaxone    Objective   Objective     Vitals:   Temp:  [97.3 °F (36.3 °C)-97.9 °F (36.6 °C)] 97.7 °F (36.5 °C)  Heart Rate:  [82-92] 82  Resp:  [16-18] 16  BP: ()/(40-68) 127/40  Flow (L/min) (Oxygen Therapy):  [1] 1  Physical Exam   Gen: Sitting up in bedside, pleasant, chronically ill-appearing elderly female  Resp: Normal respiratory effort  Card: RRR, No m/r/g  Abd: Soft, Nontender, Nondistended, + bowel sounds    Result Review    Result Review:  I have personally reviewed the results as below and agree with these findings:  []  Laboratory:   CMP          2025    01:18 2025    02:46 2025    04:26   CMP   Glucose 101  80  46    BUN 37.2  44.3  24.9    Creatinine 2.84  3.51  2.56    EGFR 15.5  12.0  17.6    Sodium 134  134  133    Potassium 4.1  3.7  4.5     Chloride 93  96  98    Calcium 8.5  8.1  8.0    Total Protein 5.9  5.2  5.5    Albumin 3.0  2.6  2.5    Globulin 2.9  2.6  3.0    Total Bilirubin 0.5  0.4  0.3    Alkaline Phosphatase 296  238  276    AST (SGOT) 40  30  57    ALT (SGPT) 19  13  26    Albumin/Globulin Ratio 1.0  1.0  0.8    BUN/Creatinine Ratio 13.1  12.6  9.7    Anion Gap 13.4  11.8  14.9      CBC          6/25/2025    01:17 6/25/2025    06:36 6/26/2025    02:46 6/27/2025    04:26   CBC   WBC 18.98   12.97  13.50    RBC 2.25   2.57  2.85    Hemoglobin 7.1     7.1  8.4  7.9  9.0    Hematocrit 23.0     23.3  26.7  26.0  31.0    .2   101.2  108.8    MCH 31.6   30.7  31.6    MCHC 30.9   30.4  29.0    RDW 17.8   18.0  18.1    Platelets 214   167  179    Blood glucose low.  Phosphorus and magnesium within normal limits.  []  Microbiology:   []  Radiology:   [x]  EKG/Telemetry:    []  Cardiology/Vascular:    []  Pathology:  []  Old records:  []  Other:    Assessment & Plan   Assessment / Plan     Assessment:  GI bleed  Coffee-ground emesis  Acute anemia secondary to GI bleed  ESRD on hemodialysis  Urinary tract infection due to unknown infectious organism  Severe protein calorie malnutrition  Constipation  Hypertension  History of renal artery stenosis  Chronic diastolic heart failure, not acutely exacerbated  Hypothyroidism    Plan:  -Gastroenterology, Nephrology and Pulmonology consulted and following, appreciate assistance and recommendations in the care of this patient.  -Ceftriaxone started.  Continue for total 3 days (day 3/3)  -Continue aspirin.  Start Plavix.  -Checking patient stool for C. Difficile  -Continue appropriate home medications  -Start home carvedilol  -Hemodialysis per nephrology  -Will monitor electrolytes and renal function with BMP and magnesium level in the AM  -Will monitor WBC and Hgb with CBC in the AM  -Clinical course will dictate further management     DVT Prophylaxis: SCDs; pharmaceutical DVT prophylaxis being held  secondary to anemia  GI Prophylaxis: Pantoprazole  Diet:   Diet Order   Procedures    Diet: Liquid; Full Liquid; Fluid Consistency: Thin (IDDSI 0)     Dispo: PT/OT consulted     Time spent personally reviewing patient's chart, labs and imaging, evaluating/examining the patient, discussing care plan with patient and nurse at bedside and discussing management with consultants (Gastroenterology, Nephrology and Pulmonology): 52 minutes.     Part of this note may be an electronic transcription/translation of spoken language to printed text using the Dragon dictation system.    VTE Prophylaxis:  Pharmacologic & mechanical VTE prophylaxis orders are present.      CODE STATUS:   Code Status (Patient has no pulse and is not breathing): CPR (Attempt to Resuscitate)  Medical Interventions (Patient has pulse or is breathing): Limited Support  Medical Intervention Limits: No intubation (DNI)      Electronically signed by Pieter Salgado MD, 6/27/2025, 12:54 EDT.

## 2025-06-27 NOTE — THERAPY EVALUATION
Acute Care - Physical Therapy Initial Evaluation  MAURICE Escamilla     Patient Name: Charlette Rai  : 1935  MRN: [id number]  Today's Date: 2025      Visit Dx:     ICD-10-CM ICD-9-CM   1. Gastrointestinal hemorrhage, unspecified gastrointestinal hemorrhage type  K92.2 578.9   2. Decreased activities of daily living (ADL)  Z78.9 V49.89   3. Difficulty walking  R26.2 719.7     Patient Active Problem List   Diagnosis    Malignant neoplasm of upper lobe of right lung    Rheumatoid arthritis    Asthma    Chronic heart failure with preserved ejection fraction    Essential hypertension    GERD (gastroesophageal reflux disease)    Hyperlipidemia LDL goal <70    Lumbar spinal stenosis    Migraine    Monoclonal paraproteinemia    Osteopenia    Oxygen dependent    Peripheral neuropathy    Stage 4 chronic kidney disease    Vitamin D deficiency    Carotid artery stenosis    Chronic right-sided thoracic back pain    Peripheral vascular disease of lower extremity    Ex-smoker    De Quervain's tenosynovitis    Arthritis of carpometacarpal (CMC) joint of right thumb    Steal syndrome of dialysis vascular access    Anemia of chronic renal failure, stage 4 (severe)    Aortic stenosis, moderate    Hematoma    End stage renal disease on dialysis    Coronary artery calcification seen on CT scan    Frailty syndrome in geriatric patient    COPD with lower respiratory infection    Coagulation defect, unspecified    Hyperphosphatemia    Hypothyroidism (acquired)    Idiopathic osteoarthritis    Secondary multiple arthritis    Type 2 diabetes mellitus with diabetic peripheral angiopathy without gangrene    Renal artery stenosis    S/P renal artery angioplasty    Atherosclerosis of renal artery    Abnormal serum immunoelectrophoresis    NSVT (nonsustained ventricular tachycardia)    Hypertensive heart and chronic kidney disease with heart failure and with stage 5 chronic kidney disease, or end stage renal disease    Unspecified  protein-calorie malnutrition    IFG (impaired fasting glucose)    Iron deficiency anemia    AV graft malfunction, initial encounter    AV graft malfunction    Weakness    Malignant neoplasm of lower lobe of left lung    Malignant neoplasm of lower lobe, right bronchus or lung    GI bleed    Severe protein-calorie malnutrition     Past Medical History:   Diagnosis Date    Allergic rhinitis     Anaphylactic shock, unspecified, initial encounter 12/05/2023    Anemia     Arthritis     Asthma     USE INHALERS AND NEBULIZERS    Back pain     Bladder disorder     Cancer     LEFT LUNG CANCER 2005 SURGERY AND CHEMO DONE  AND CURRENTLY 4/ 14/22  RIGHT LUNG CANCER HAS ONLY RECEIVED RADIATION THUS FAR    Chronic kidney disease     stage 3    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 DENIES CP BUT DOES HAVE SOA CHRONIC ISSUE COPD/LUNG CANCER    COPD (chronic obstructive pulmonary disease)     FOLLOWED BY DR MARIAJOSE BAILEY    Coronary artery disease     DENIES CP BUT DOES GET SOA MOST OF THE TIME WITH EXERTION BUT OCC AT REST CHRONIC ISSUE COPD/LUNG CANCER    Deep vein thrombosis     Diabetes mellitus     DOES NOT CHECK BS DAILY    Disease of thyroid gland     HYPOTHYROIDISM    Essential hypertension     Gastric ulcer     GERD (gastroesophageal reflux disease)     Heart murmur     History of transfusion     NO ISSUES POST TRANSFUSION WAS MANY YEARS AGO    Hyperlipemia     Leukocytopenia     FOLLOWED BY DR MIR BAILEY    Limb swelling     Lumbago     Lumbar spinal stenosis     Lung cancer     Migraine headache     Multiple joint pain     On home oxygen therapy     4L/NC PRN    Osteopenia     PNA (pneumonia) 05/04/2024    Pseudomonas pneumonia 04/29/2024    Reflux esophagitis     Shortness of breath     Thyroid nodule     HAS ULTRASOUND YEARLY BEING MONITORED    Vascular  disease     Vitamin D deficiency      Past Surgical History:   Procedure Laterality Date    ABDOMINAL SURGERY      ANGIOPLASTY RENAL ARTERY Left 06/14/2024    stent placement    APPENDECTOMY      ARTERIOGRAM N/A 6/14/2024    Procedure: Arteriogram, right radial access, aortogram and renal arteriogram with possible intervention.;  Surgeon: Dio Miguel MD;  Location: [facility] CATH INVASIVE LOCATION;  Service: Peripheral Vascular;  Laterality: N/A;    ARTERIOVENOUS FISTULA/SHUNT SURGERY Left 05/10/2022    Procedure: Left basilic vein transposition;  Surgeon: Wan Barksdale MD;  Location: [facility] MAIN OR;  Service: Vascular;  Laterality: Left;    ARTERIOVENOUS FISTULA/SHUNT SURGERY Left 03/23/2023    Procedure: Ligation of left arm arteriovenous fistula;  Surgeon: Wan Barksdale MD;  Location: [facility] MAIN OR;  Service: Vascular;  Laterality: Left;    ARTERIOVENOUS FISTULA/SHUNT SURGERY Left 11/30/2023    Procedure: Creation of left arm arteriovenous graft;  Surgeon: Wan Barksdale MD;  Location: [facility] MAIN OR;  Service: Vascular;  Laterality: Left;    BRONCHOSCOPY N/A 04/24/2024    Procedure: BRONCHOSCOPY WITH BAL AND WASHINGS;  Surgeon: Khalif Yanez MD;  Location: [facility] ENDOSCOPY;  Service: Pulmonary;  Laterality: N/A;  MUCUS PLUGGING    CARDIAC CATHETERIZATION  1996    CARDIAC SURGERY      CARDIAC SURGERY      fluid drained from heart    CATARACT EXTRACTION, BILATERAL  2003    COLONOSCOPY  2014    ENDOSCOPY  2016    2019    ENDOSCOPY N/A 6/25/2025    Procedure: ESOPHAGOGASTRODUODENOSCOPY;  Surgeon: Tanvi Del Valle MD;  Location: [facility] ENDOSCOPY;  Service: Gastroenterology;  Laterality: N/A;  gastritis, esophagitis    FEMORAL ARTERY STENT Bilateral     HYSTERECTOMY      LEG THROMBECTOMY/EMBOLECTOMY Left 1/29/2025    Procedure: LEFT LOWER EXTREMITY ANGIOGRAM;  Surgeon: Jaden Castañeda MD;  Location: [facility] HYBRID OR;  Service: Vascular;  Laterality: Left;    LUNG BIOPSY Left 2005    lobectomy upper lung  caner    LUNG VOLUME REDUCTION      OTHER SURGICAL HISTORY      artifical joints/limbs    REPLACEMENT TOTAL KNEE Left 2016    SHUNT O GRAM N/A 1/13/2025    Procedure: dialysis shuntogram;  Surgeon: Court Valente MD;  Location: [facility] INVASIVE LOCATION;  Service: Peripheral Vascular;  Laterality: N/A;    UPPER GASTROINTESTINAL ENDOSCOPY       PT Assessment (Last 12 Hours)       PT Evaluation and Treatment       Row Name 06/27/25 1400          Physical Therapy Time and Intention    Document Type evaluation  -AV     Mode of Treatment individual therapy;physical therapy  -AV     Comment Patient asking for breathing treatment following session. Nurse notified.  -AV       Row Name 06/27/25 1400          General Information    Patient Profile Reviewed yes  -AV     Patient Observations alert;cooperative;agree to therapy  -AV     Prior Level of Function --  Typically (I) with ADLs. Ambulated with RW. Recently underwent surgery at [facility] on 6/7/25 to repair L hip fracture. Since that time has been requiring assist with ADLs. Wears 3.5 L O2. Has been in rehab since discharge from [facility].  -AV     Equipment Currently Used at Home oxygen  -AV     Existing Precautions/Restrictions weight bearing  WBAT LLE  -AV       Row Name 06/27/25 1400          Living Environment    Current Living Arrangements home  -AV     Home Accessibility stairs to enter home  -AV     People in Home spouse  Most recently at [facility] for rehab  -AV       Row Name 06/27/25 1400          Home Main Entrance    Number of Stairs, Main Entrance three  -AV     Stair Railings, Main Entrance railings on both sides of stairs  -AV       Row Name 06/27/25 1400          Cognition    Orientation Status (Cognition) oriented x 3  -AV       Row Name 06/27/25 1400          Range of Motion (ROM)    Range of Motion bilateral lower extremities;ROM is WFL  -AV       Row Name 06/27/25 1400          Strength (Manual Muscle Testing)    Strength (Manual Muscle Testing) right  lower extremity strength detail;left lower extremity strength detail  -AV     Left Lower Extremity Strength hip  -AV     Right Lower Extremity Strength right LE strength is WFL  -AV     Hip, Left (Strength) 3/5  -AV       Row Name 06/27/25 1400          Mobility    Extremity Weight-bearing Status left lower extremity  -AV     Left Lower Extremity (Weight-bearing Status) weight-bearing as tolerated (WBAT)  -AV       Row Name 06/27/25 1400          Bed Mobility    Bed Mobility supine-sit;sit-supine  -AV     Supine-Sit Hartley (Bed Mobility) minimum assist (75% patient effort)  -AV     Sit-Supine [place] (Bed Mobility) minimum assist (75% patient effort)  -AV     Bed Mobility, Safety Issues decreased use of arms for pushing/pulling;decreased use of legs for bridging/pushing  -AV     Assistive Device (Bed Mobility) bed rails;head of bed elevated;repositioning sheet  -AV       Row Name 06/27/25 1400          Transfers    Transfers sit-stand transfer;stand-sit transfer;toilet transfer  -AV       Row Name 06/27/25 1400          Sit-Stand Transfer    Sit-Stand Hartley (Transfers) minimum assist (75% patient effort)  -AV     Assistive Device (Sit-Stand Transfers) walker, front-wheeled  -AV       Row Name 06/27/25 1400          Stand-Sit Transfer    Stand-Sit Hartley (Transfers) minimum assist (75% patient effort)  -AV     Assistive Device (Stand-Sit Transfers) walker, front-wheeled  -AV       Row Name 06/27/25 1400          Toilet Transfer    Type (Toilet Transfer) sit-stand;stand-sit  -AV     Hartley Level (Toilet Transfer) minimum assist (75% patient effort)  -AV     Assistive Device (Toilet Transfer) commode, 3-in-1  -AV       Row Name 06/27/25 1400          Gait/Stairs (Locomotion)    Gait/Stairs Locomotion gait/ambulation independence;gait/ambulation assistive device;distance ambulated  -AV     Hartley Level (Gait) minimum assist (75% patient effort)  -AV     Assistive Device (Gait) walker,  front-wheeled  -AV     Distance in Feet (Gait) 10  -AV     Deviations/Abnormal Patterns (Gait) antalgic;gait speed decreased;stride length decreased  -AV     Left Sided Gait Deviations weight shift ability decreased  -AV       Row Name 06/27/25 1400          Safety Issues/Impairments Affecting Functional Mobility    Impairments Affecting Function (Mobility) balance;endurance/activity tolerance;pain;strength;shortness of breath  -AV       Row Name 06/27/25 1400          Balance    Balance Assessment standing dynamic balance  -AV     Dynamic Standing Balance minimal assist  -AV     Position/Device Used, Standing Balance supported;walker, front-wheeled  -AV       Row Name             Wound Bilateral midline sacral spine Pressure Injury    Wound - Properties Group Present on Original Admission: Y  -JT Side: Bilateral  -JT Orientation: midline  -JT Location: sacral spine  -JT Primary Wound Type: Pressure Inj  -JT    Retired Wound - Properties Group Present on Original Admission: Y  -JT Side: Bilateral  -JT Orientation: midline  -JT Location: sacral spine  -JT    Retired Wound - Properties Group Present on Original Admission: Y  -JT Side: Bilateral  -JT Orientation: midline  -JT Location: sacral spine  -JT    Retired Wound - Properties Group Present on Original Admission: Y  -JT Side: Bilateral  -JT Location: sacral spine  -JT      Row Name             Wound Left posterior heel Pressure Injury    Wound - Properties Group Present on Original Admission: Y  -JT Side: Left  -JT Orientation: posterior  -JT Location: heel  -JT Primary Wound Type: Pressure Inj  -JT    Retired Wound - Properties Group Present on Original Admission: Y  -JT Side: Left  -JT Orientation: posterior  -JT Location: heel  -JT    Retired Wound - Properties Group Present on Original Admission: Y  -JT Side: Left  -JT Orientation: posterior  -JT Location: heel  -JT    Retired Wound - Properties Group Present on Original Admission: Y  -JT Side: Left  -JT  Location: heel  -JT      Row Name             Wound Right posterior heel Pressure Injury    Wound - Properties Group Present on Original Admission: Y  -JT Side: Right  -JT Orientation: posterior  -JT Location: heel  -JT Primary Wound Type: Pressure Inj  -JT    Retired Wound - Properties Group Present on Original Admission: Y  -JT Side: Right  -JT Orientation: posterior  -JT Location: heel  -JT    Retired Wound - Properties Group Present on Original Admission: Y  -JT Side: Right  -JT Orientation: posterior  -JT Location: heel  -JT    Retired Wound - Properties Group Present on Original Admission: Y  -JT Side: Right  -JT Location: heel  -JT      Row Name             Wound Left lower leg Traumatic Abrasion    Wound - Properties Group Present on Original Admission: Y  -JT Side: Left  -JT Orientation: lower  -JT Location: leg  -JT Primary Wound Type: Traumatic  -JT Secondary Wound Type - Traumatic: Abrasion  -JT    Retired Wound - Properties Group Present on Original Admission: Y  -JT Side: Left  -JT Orientation: lower  -JT Location: leg  -JT    Retired Wound - Properties Group Present on Original Admission: Y  -JT Side: Left  -JT Orientation: lower  -JT Location: leg  -JT    Retired Wound - Properties Group Present on Original Admission: Y  -JT Side: Left  -JT Location: leg  -JT      Row Name             Wound Left anterior knee Surgical Closed Surgical Incision    Wound - Properties Group Present on Original Admission: Y  -JT Side: Left  -JT Orientation: anterior  -JT Location: knee  -JT Primary Wound Type: Surgical  -JT Secondary Wound Type - Surgical: Closed Surgi  -JT    Retired Wound - Properties Group Present on Original Admission: Y  -JT Side: Left  -JT Orientation: anterior  -JT Location: knee  -JT    Retired Wound - Properties Group Present on Original Admission: Y  -JT Side: Left  -JT Orientation: anterior  -JT Location: knee  -JT    Retired Wound - Properties Group Present on Original Admission: Y  -JT Side:  Left  -JT Location: knee  -JT      Row Name             Wound Left anterior greater trochanter Surgical Closed Surgical Incision    Wound - Properties Group Present on Original Admission: Y  -JT Side: Left  -JT Orientation: anterior  -JT Location: greater trochanter  -JT Primary Wound Type: Surgical  -JT Secondary Wound Type - Surgical: Closed Surgi  -JT    Retired Wound - Properties Group Present on Original Admission: Y  -JT Side: Left  -JT Orientation: anterior  -JT Location: greater trochanter  -JT    Retired Wound - Properties Group Present on Original Admission: Y  -JT Side: Left  -JT Orientation: anterior  -JT Location: greater trochanter  -JT    Retired Wound - Properties Group Present on Original Admission: Y  -JT Side: Left  -JT Location: greater trochanter  -JT      Row Name 06/27/25 1400          Plan of Care Review    Plan of Care Reviewed With patient  -AV     Progress no change  -AV     Outcome Evaluation Patient presents with deficits in balance, endurance, transfers, and ambulation. Patient will benefit from skilled PT services to address these mobility deficits and decrease risk of falls.  -AV       Row Name 06/27/25 1400          Vital Signs    O2 Delivery Pre Treatment nasal cannula  -AV     O2 Delivery Intra Treatment nasal cannula  -AV     O2 Delivery Post Treatment nasal cannula  -AV       Row Name 06/27/25 1400          Positioning and Restraints    Pre-Treatment Position in bed  -AV     Post Treatment Position bed  -AV     In Bed fowlers;call light within reach;encouraged to call for assist;exit alarm on  -AV       Row Name 06/27/25 1400          Therapy Assessment/Plan (PT)    Rehab Potential (PT) good  -AV     Criteria for Skilled Interventions Met (PT) yes;meets criteria  -AV     Therapy Frequency (PT) daily  -AV     Predicted Duration of Therapy Intervention (PT) 10 days  -AV     Problem List (PT) problems related to;balance;mobility;pain;strength  -AV     Activity Limitations Related to  Problem List (PT) unable to transfer safely;unable to ambulate safely  -AV       Row Name 06/27/25 1400          PT Evaluation Complexity    History, PT Evaluation Complexity 1-2 personal factors and/or comorbidities  -AV     Examination of Body Systems (PT Eval Complexity) total of 4 or more elements  -AV     Clinical Presentation (PT Evaluation Complexity) stable  -AV     Clinical Decision Making (PT Evaluation Complexity) low complexity  -AV     Overall Complexity (PT Evaluation Complexity) low complexity  -AV       Row Name 06/27/25 1400          Therapy Plan Review/Discharge Plan (PT)    Therapy Plan Review (PT) evaluation/treatment results reviewed;patient  -AV       Row Name 06/27/25 1400          Physical Therapy Goals    Bed Mobility Goal Selection (PT) bed mobility, PT goal 1  -AV     Transfer Goal Selection (PT) transfer, PT goal 1  -AV     Gait Training Goal Selection (PT) gait training, PT goal 1  -AV       Row Name 06/27/25 1400          Bed Mobility Goal 1 (PT)    Activity/Assistive Device (Bed Mobility Goal 1, PT) sit to supine/supine to sit  -AV     Sampson Level/Cues Needed (Bed Mobility Goal 1, PT) modified independence  -AV     Time Frame (Bed Mobility Goal 1, PT) 10 days  -AV       Row Name 06/27/25 1400          Transfer Goal 1 (PT)    Activity/Assistive Device (Transfer Goal 1, PT) sit-to-stand/stand-to-sit;bed-to-chair/chair-to-bed;walker, rolling  -AV     Sampson Level/Cues Needed (Transfer Goal 1, PT) modified independence  -AV     Time Frame (Transfer Goal 1, PT) 10 days  -AV       Row Name 06/27/25 1400          Gait Training Goal 1 (PT)    Activity/Assistive Device (Gait Training Goal 1, PT) gait (walking locomotion);assistive device use;walker, rolling  -AV     Sampson Level (Gait Training Goal 1, PT) modified independence  -AV     Distance (Gait Training Goal 1, PT) 120  -AV     Time Frame (Gait Training Goal 1, PT) 10 days  -AV               User Key  (r) = Recorded By,  (t) = Taken By, (c) = Cosigned By      Initials Name Provider Type    AV Abdi Odom, PT Physical Therapist    Padmaja Soto RN Registered Nurse                    Physical Therapy Education       Title: PT OT SLP Therapies (In Progress)       Topic: Physical Therapy (In Progress)       Point: Mobility training (Done)       Learning Progress Summary            Patient Acceptance, E,TB, VU by  at 6/27/2025 1501                      Point: Home exercise program (Not Started)       Learner Progress:  Not documented in this visit.              Point: Body mechanics (Done)       Learning Progress Summary            Patient Acceptance, E,TB, VU by AV at 6/27/2025 1501                      Point: Precautions (Done)       Learning Progress Summary            Patient Acceptance, E,TB, VU by AV at 6/27/2025 1501                                      User Key       Initials Effective Dates Name Provider Type Discipline     06/11/21 -  Abdi Odom, PT Physical Therapist PT                  PT Recommendation and Plan  Anticipated Discharge Disposition (PT): sub acute care setting  Planned Therapy Interventions (PT): balance training, bed mobility training, gait training, home exercise program, neuromuscular re-education, strengthening, transfer training  Therapy Frequency (PT): daily  Plan of Care Reviewed With: patient  Progress: no change  Outcome Evaluation: Patient presents with deficits in balance, endurance, transfers, and ambulation. Patient will benefit from skilled PT services to address these mobility deficits and decrease risk of falls.   Outcome Measures       Row Name 06/27/25 1500             How much help from another person do you currently need...    Turning from your back to your side while in flat bed without using bedrails? 3  -AV      Moving from lying on back to sitting on the side of a flat bed without bedrails? 3  -AV      Moving to and from a bed to a chair (including a  wheelchair)? 3  -AV      Standing up from a chair using your arms (e.g., wheelchair, bedside chair)? 3  -AV      Climbing 3-5 steps with a railing? 2  -AV      To walk in hospital room? 3  -AV      AM-PAC 6 Clicks Score (PT) 17  -AV         Functional Assessment    Outcome Measure Options AM-PAC 6 Clicks Basic Mobility (PT)  -AV                User Key  (r) = Recorded By, (t) = Taken By, (c) = Cosigned By      Initials Name Provider Type    Abdi Tee, PT Physical Therapist                     Time Calculation:    PT Charges       Row Name 06/27/25 1500             Time Calculation    PT Received On 06/27/25  -AV      PT Goal Re-Cert Due Date 07/06/25  -AV         Untimed Charges    PT Eval/Re-eval Minutes 48  -AV         Total Minutes    Untimed Charges Total Minutes 48  -AV       Total Minutes 48  -AV                User Key  (r) = Recorded By, (t) = Taken By, (c) = Cosigned By      Initials Name Provider Type    Abdi Tee, PT Physical Therapist                  Therapy Charges for Today       Code Description Service Date Service Provider Modifiers Qty    [id number] HC PT EVAL LOW COMPLEXITY 4 6/27/2025 Abdi Odom, PT GP 1            PT G-Codes  Outcome Measure Options: AM-PAC 6 Clicks Basic Mobility (PT)  AM-PAC 6 Clicks Score (PT): 17  AM-PAC 6 Clicks Score (OT): 15    Abdi Odom PT  6/27/2025

## 2025-06-27 NOTE — THERAPY EVALUATION
Patient Name: Charlette Rai  : 1935    MRN: [id number]                              Today's Date: 2025       Admit Date: 2025    Visit Dx:     ICD-10-CM ICD-9-CM   1. Gastrointestinal hemorrhage, unspecified gastrointestinal hemorrhage type  K92.2 578.9   2. Decreased activities of daily living (ADL)  Z78.9 V49.89     Patient Active Problem List   Diagnosis    Malignant neoplasm of upper lobe of right lung    Rheumatoid arthritis    Asthma    Chronic heart failure with preserved ejection fraction    Essential hypertension    GERD (gastroesophageal reflux disease)    Hyperlipidemia LDL goal <70    Lumbar spinal stenosis    Migraine    Monoclonal paraproteinemia    Osteopenia    Oxygen dependent    Peripheral neuropathy    Stage 4 chronic kidney disease    Vitamin D deficiency    Carotid artery stenosis    Chronic right-sided thoracic back pain    Peripheral vascular disease of lower extremity    Ex-smoker    De Quervain's tenosynovitis    Arthritis of carpometacarpal (CMC) joint of right thumb    Steal syndrome of dialysis vascular access    Anemia of chronic renal failure, stage 4 (severe)    Aortic stenosis, moderate    Hematoma    End stage renal disease on dialysis    Coronary artery calcification seen on CT scan    Frailty syndrome in geriatric patient    COPD with lower respiratory infection    Coagulation defect, unspecified    Hyperphosphatemia    Hypothyroidism (acquired)    Idiopathic osteoarthritis    Secondary multiple arthritis    Type 2 diabetes mellitus with diabetic peripheral angiopathy without gangrene    Renal artery stenosis    S/P renal artery angioplasty    Atherosclerosis of renal artery    Abnormal serum immunoelectrophoresis    NSVT (nonsustained ventricular tachycardia)    Hypertensive heart and chronic kidney disease with heart failure and with stage 5 chronic kidney disease, or end stage renal disease    Unspecified protein-calorie malnutrition    IFG (impaired fasting  glucose)    Iron deficiency anemia    AV graft malfunction, initial encounter    AV graft malfunction    Weakness    Malignant neoplasm of lower lobe of left lung    Malignant neoplasm of lower lobe, right bronchus or lung    GI bleed    Severe protein-calorie malnutrition     Past Medical History:   Diagnosis Date    Allergic rhinitis     Anaphylactic shock, unspecified, initial encounter 12/05/2023    Anemia     Arthritis     Asthma     USE INHALERS AND NEBULIZERS    Back pain     Bladder disorder     Cancer     LEFT LUNG CANCER 2005 SURGERY AND CHEMO DONE  AND CURRENTLY 4/ 14/22  RIGHT LUNG CANCER HAS ONLY RECEIVED RADIATION THUS FAR    Chronic kidney disease     stage 3    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 DENIES CP BUT DOES HAVE SOA CHRONIC ISSUE COPD/LUNG CANCER    COPD (chronic obstructive pulmonary disease)     FOLLOWED BY DR MARIAJOSE BAILEY    Coronary artery disease     DENIES CP BUT DOES GET SOA MOST OF THE TIME WITH EXERTION BUT OCC AT REST CHRONIC ISSUE COPD/LUNG CANCER    Deep vein thrombosis     Diabetes mellitus     DOES NOT CHECK BS DAILY    Disease of thyroid gland     HYPOTHYROIDISM    Essential hypertension     Gastric ulcer     GERD (gastroesophageal reflux disease)     Heart murmur     History of transfusion     NO ISSUES POST TRANSFUSION WAS MANY YEARS AGO    Hyperlipemia     Leukocytopenia     FOLLOWED BY DR MIR BAILEY    Limb swelling     Lumbago     Lumbar spinal stenosis     Lung cancer     Migraine headache     Multiple joint pain     On home oxygen therapy     4L/NC PRN    Osteopenia     PNA (pneumonia) 05/04/2024    Pseudomonas pneumonia 04/29/2024    Reflux esophagitis     Shortness of breath     Thyroid nodule     HAS ULTRASOUND YEARLY BEING MONITORED    Vascular disease     Vitamin D deficiency      Past Surgical  History:   Procedure Laterality Date    ABDOMINAL SURGERY      ANGIOPLASTY RENAL ARTERY Left 06/14/2024    stent placement    APPENDECTOMY      ARTERIOGRAM N/A 6/14/2024    Procedure: Arteriogram, right radial access, aortogram and renal arteriogram with possible intervention.;  Surgeon: Dio Miguel MD;  Location: [facility] CATH INVASIVE LOCATION;  Service: Peripheral Vascular;  Laterality: N/A;    ARTERIOVENOUS FISTULA/SHUNT SURGERY Left 05/10/2022    Procedure: Left basilic vein transposition;  Surgeon: Wan Barksdale MD;  Location: [facility] MAIN OR;  Service: Vascular;  Laterality: Left;    ARTERIOVENOUS FISTULA/SHUNT SURGERY Left 03/23/2023    Procedure: Ligation of left arm arteriovenous fistula;  Surgeon: Wan Barksdale MD;  Location: [facility] MAIN OR;  Service: Vascular;  Laterality: Left;    ARTERIOVENOUS FISTULA/SHUNT SURGERY Left 11/30/2023    Procedure: Creation of left arm arteriovenous graft;  Surgeon: Wan Barksdale MD;  Location: [facility] MAIN OR;  Service: Vascular;  Laterality: Left;    BRONCHOSCOPY N/A 04/24/2024    Procedure: BRONCHOSCOPY WITH BAL AND WASHINGS;  Surgeon: Khalif Yanez MD;  Location: [facility] ENDOSCOPY;  Service: Pulmonary;  Laterality: N/A;  MUCUS PLUGGING    CARDIAC CATHETERIZATION  1996    CARDIAC SURGERY      CARDIAC SURGERY      fluid drained from heart    CATARACT EXTRACTION, BILATERAL  2003    COLONOSCOPY  2014    ENDOSCOPY  2016    2019    ENDOSCOPY N/A 6/25/2025    Procedure: ESOPHAGOGASTRODUODENOSCOPY;  Surgeon: Tanvi Del Valle MD;  Location: [facility] ENDOSCOPY;  Service: Gastroenterology;  Laterality: N/A;  gastritis, esophagitis    FEMORAL ARTERY STENT Bilateral     HYSTERECTOMY      LEG THROMBECTOMY/EMBOLECTOMY Left 1/29/2025    Procedure: LEFT LOWER EXTREMITY ANGIOGRAM;  Surgeon: Jaden Castañeda MD;  Location: [facility] HYBRID OR;  Service: Vascular;  Laterality: Left;    LUNG BIOPSY Left 2005    lobectomy upper lung caner    LUNG VOLUME REDUCTION      OTHER SURGICAL  HISTORY      artifical joints/limbs    REPLACEMENT TOTAL KNEE Left 2016    SHUNT O GRAM N/A 1/13/2025    Procedure: dialysis shuntogram;  Surgeon: Court Valente MD;  Location: [facility] INVASIVE LOCATION;  Service: Peripheral Vascular;  Laterality: N/A;    UPPER GASTROINTESTINAL ENDOSCOPY        General Information       Row Name 06/27/25 1354          OT Time and Intention    Document Type evaluation  -     Mode of Treatment individual therapy;occupational therapy  -       Row Name 06/27/25 8445          General Information    Patient Profile Reviewed yes  -     Prior Level of Function --  Pt reports typically being (I) but has required assist after recent L hip fx with repair at [facility] on 6/7. She ambulates with a RW, has a step over tub with tub transfer bench/grab bars/handheld shower head, elevated commode, and wears 3-3.5L home O2.  -     Existing Precautions/Restrictions fall;weight bearing  WBAT LLE  -     Barriers to Rehab none identified  -       Row Name 06/27/25 1486          Occupational Profile    Reason for Services/Referral (Occupational Profile) Patient is an 89 year old female admitted to [facility] on June 24th, 2025. Occupational therapy consulted due to recent decline in ADLs/functional transfers. No previous occupational therapy services for current condition.  -       Row Name 06/27/25 0124          Living Environment    Current Living Arrangements home  -     People in Home spouse  Recently at [facility] for rehab  -       Row Name 06/27/25 3839          Cognition    Orientation Status (Cognition) oriented x 3  -       Row Name 06/27/25 1979          Safety Issues/Impairments Affecting Functional Mobility    Impairments Affecting Function (Mobility) balance;endurance/activity tolerance;pain;strength  -               User Key  (r) = Recorded By, (t) = Taken By, (c) = Cosigned By      Initials Name Provider Type    LF Perlita Mckay, OT Occupational  Therapist                     Mobility/ADL's       Row Name 06/27/25 [facility]          Bed Mobility    Bed Mobility supine-sit  -     Supine-Sit [place] (Bed Mobility) moderate assist (50% patient effort)  -     Bed Mobility, Safety Issues decreased use of legs for bridging/pushing;decreased use of arms for pushing/pulling  -     Assistive Device (Bed Mobility) bed rails;head of bed elevated  -       Row Name 06/27/25 [facility]          Transfers    Transfers sit-stand transfer;stand-sit transfer  -       Row Name 06/27/25 [facility]          Sit-Stand Transfer    Sit-Stand [place] (Transfers) minimum assist (75% patient effort)  -     Assistive Device (Sit-Stand Transfers) walker, front-wheeled  -       Row Name 06/27/25 [facility]          Stand-Sit Transfer    Stand-Sit [place] (Transfers) minimum assist (75% patient effort)  -     Assistive Device (Stand-Sit Transfers) walker, front-wheeled  -       Row Name 06/27/25 [facility]          Activities of Daily Living    BADL Assessment/Intervention bathing;upper body dressing;lower body dressing;grooming;feeding;toileting  -[facility] Name 06/27/25 [facility]          Mobility    Extremity Weight-bearing Status left lower extremity  -     Left Lower Extremity (Weight-bearing Status) weight-bearing as tolerated (WBAT)  -       Row Name 06/27/25 [facility]          Bathing Assessment/Intervention    [place] Level (Bathing) bathing skills;upper body;standby assist;lower body;maximum assist (25% patient effort)  -[facility] Name 06/27/25 [facility]          Upper Body Dressing Assessment/Training    [place] Level (Upper Body Dressing) upper body dressing skills;standby assist  -[facility] Name 06/27/25 [facility]          Lower Body Dressing Assessment/Training    [place] Level (Lower Body Dressing) lower body dressing skills;maximum assist (25% patient effort)  -       Row Name 06/27/25 [facility]          Grooming Assessment/Training    [place] Level  (Grooming) grooming skills;standby assist  -[facility] Name 06/27/25 [facility]          Self-Feeding Assessment/Training    [place] Level (Feeding) feeding skills;set up  -LF       Row Name 06/27/25 [facility]          Toileting Assessment/Training    [place] Level (Toileting) toileting skills;maximum assist (25% patient effort);dependent (less than 25% patient effort)  -               User Key  (r) = Recorded By, (t) = Taken By, (c) = Cosigned By      Initials Name Provider Type     Perlita Mckay OT Occupational Therapist                   Obj/Interventions       [facility] Name 06/27/25 [facility]          Sensory Assessment (Somatosensory)    Sensory Assessment (Somatosensory) UE sensation intact  -LF       Row Name 06/27/25 [facility]          Vision Assessment/Intervention    Visual Impairment/Limitations WFL  -LF       Row Name 06/27/25 [facility]          Range of Motion Comprehensive    General Range of Motion bilateral upper extremity ROM WFL  -LF       Row Name 06/27/25 [facility]          Strength Comprehensive (MMT)    Comment, General Manual Muscle Testing (MMT) Assessment 4-/5 BUEs  -LF       Row Name 06/27/25 [facility]          Motor Skills    Motor Skills coordination;functional endurance  -LF     Coordination bilateral;upper extremity;WFL  -LF     Functional Endurance Fair-  -LF       Row Name 06/27/25 [facility]          Balance    Balance Assessment sitting dynamic balance;standing dynamic balance  -LF     Dynamic Sitting Balance standby assist  -LF     Position, Sitting Balance unsupported;sitting edge of bed  -LF     Dynamic Standing Balance minimal assist  -LF     Position/Device Used, Standing Balance supported;walker, front-wheeled  -               User Key  (r) = Recorded By, (t) = Taken By, (c) = Cosigned By      Initials Name Provider Type     Perlita Mckay OT Occupational Therapist                   Goals/Plan       Row Name 06/27/25 [facility]          Bed Mobility Goal 1 (OT)    Activity/Assistive Device (Bed  Mobility Goal 1, OT) bed mobility activities, all  -LF     [place] Level/Cues Needed (Bed Mobility Goal 1, OT) modified independence  -LF     Time Frame (Bed Mobility Goal 1, OT) long term goal (LTG);10 days  -LF       Row Name 06/27/25 [facility]          Transfer Goal 1 (OT)    Activity/Assistive Device (Transfer Goal 1, OT) transfers, all  -LF     [place] Level/Cues Needed (Transfer Goal 1, OT) modified independence  -LF     Time Frame (Transfer Goal 1, OT) long term goal (LTG);10 days  -LF       Row Name 06/27/25 [facility]          Bathing Goal 1 (OT)    Activity/Device (Bathing Goal 1, OT) bathing skills, all  -LF     [place] Level/Cues Needed (Bathing Goal 1, OT) modified independence  -LF     Time Frame (Bathing Goal 1, OT) long term goal (LTG);10 days  -LF       Row Name 06/27/25 [facility]          Dressing Goal 1 (OT)    Activity/Device (Dressing Goal 1, OT) dressing skills, all  -LF     [place]/Cues Needed (Dressing Goal 1, OT) modified independence  -LF     Time Frame (Dressing Goal 1, OT) long term goal (LTG);10 days  -LF       Row Name 06/27/25 1354          Toileting Goal 1 (OT)    Activity/Device (Toileting Goal 1, OT) toileting skills, all  -LF     [place] Level/Cues Needed (Toileting Goal 1, OT) modified independence  -LF     Time Frame (Toileting Goal 1, OT) long term goal (LTG);10 days  -LF       Row Name 06/27/25 [facility]          Problem Specific Goal 1 (OT)    Problem Specific Goal 1 (OT) Patient will demonstrate good endurance to support ADLs/functional transfers.  -LF     Time Frame (Problem Specific Goal 1, OT) long term goal (LTG);10 days  -LF       Row Name 06/27/25 6257          Therapy Assessment/Plan (OT)    Planned Therapy Interventions (OT) activity tolerance training;BADL retraining;functional balance retraining;occupation/activity based interventions;patient/caregiver education/training;strengthening exercise;transfer/mobility retraining  -LF               User Key  (r) =  Recorded By, (t) = Taken By, (c) = Cosigned By      Initials Name Provider Type     Perlita Mckay OT Occupational Therapist                   Clinical Impression       Row Name 06/27/25 [facility]          Pain Assessment    Additional Documentation Pain Scale: FACES Pre/Post-Treatment (Group)  -LF       Row Name 06/27/25 [facility]          Pain Scale: FACES Pre/Post-Treatment    Pain: FACES Scale, Pretreatment 2-->hurts little bit  -LF     Posttreatment Pain Rating 2-->hurts little bit  -[facility] Name 06/27/25 [facility]          Plan of Care Review    Plan of Care Reviewed With patient  -     Progress no change  -     Outcome Evaluation Patient presents with limitations in self-care, functional transfers, balance, and endurance. She would benefit from continued skilled occupational therapy services to maximize independence with ADLs/functional transfers.  -[facility] Name 06/27/25 [facility]          Therapy Assessment/Plan (OT)    Patient/Family Therapy Goal Statement (OT) To maximize independence.  -     Rehab Potential (OT) good  -     Criteria for Skilled Therapeutic Interventions Met (OT) yes;meets criteria;skilled treatment is necessary  -     Therapy Frequency (OT) 5 times/wk  -[facility] Name 06/27/25 [facility]          Therapy Plan Review/Discharge Plan (OT)    Anticipated Discharge Disposition (OT) sub acute care setting  -LF       Row Name 06/27/25 [facility]          Vital Signs    O2 Delivery Pre Treatment nasal cannula  -     O2 Delivery Intra Treatment nasal cannula  -     O2 Delivery Post Treatment nasal cannula  -LF       Row Name 06/27/25 [facility]          Positioning and Restraints    Pre-Treatment Position in bed  -     Post Treatment Position chair  -     In Chair reclined;call light within reach;encouraged to call for assist;exit alarm on  -               User Key  (r) = Recorded By, (t) = Taken By, (c) = Cosigned By      Initials Name Provider Type    LF Perlita Mckay, MARCELLE Occupational  Therapist                   Outcome Measures       Row Name 06/27/25 1358          How much help from another is currently needed...    Putting on and taking off regular lower body clothing? 2  -LF     Bathing (including washing, rinsing, and drying) 2  -LF     Toileting (which includes using toilet bed pan or urinal) 1  -LF     Putting on and taking off regular upper body clothing 3  -LF     Taking care of personal grooming (such as brushing teeth) 3  -LF     Eating meals 4  -LF     AM-PAC 6 Clicks Score (OT) 15  -LF       Row Name 06/27/25 6623          How much help from another person do you currently need...    Turning from your back to your side while in flat bed without using bedrails? 3  -MB     Moving from lying on back to sitting on the side of a flat bed without bedrails? 3  -MB     Moving to and from a bed to a chair (including a wheelchair)? 2  -MB     Standing up from a chair using your arms (e.g., wheelchair, bedside chair)? 2  -MB     Climbing 3-5 steps with a railing? 1  -MB     To walk in hospital room? 2  -MB     AM-PAC 6 Clicks Score (PT) 13  -MB       Row Name 06/27/25 1358          Functional Assessment    Outcome Measure Options AM-PAC 6 Clicks Daily Activity (OT);Optimal Instrument  -LF       Row Name 06/27/25 7093          Optimal Instrument    Optimal Instrument Optimal - 3  -LF     Bending/Stooping 4  -LF     Standing 2  -LF     Reaching 1  -LF     From the list, choose the 3 activities you would most like to be able to do without any difficulty Bending/stooping;Standing;Reaching  -LF     Total Score Optimal - 3 7  -LF               User Key  (r) = Recorded By, (t) = Taken By, (c) = Cosigned By      Initials Name Provider Type    Haley Cross, RN Registered Nurse    Perlita Holman OT Occupational Therapist                    Occupational Therapy Education       Title: PT OT SLP Therapies (In Progress)       Topic: Occupational Therapy (In Progress)       Point: ADL training (In  Progress)       Learning Progress Summary            Patient Acceptance, E,TB, NR by  at 6/27/2025 1358                      Point: Precautions (In Progress)       Learning Progress Summary            Patient Acceptance, E,TB, NR by  at 6/27/2025 1358                      Point: Body mechanics (In Progress)       Learning Progress Summary            Patient Acceptance, E,TB, NR by  at 6/27/2025 1358                                      User Key       Initials Effective Dates Name Provider Type Discipline     06/16/21 -  Perlita Mckay, MARCELLE Occupational Therapist OT                  OT Recommendation and Plan  Planned Therapy Interventions (OT): activity tolerance training, BADL retraining, functional balance retraining, occupation/activity based interventions, patient/caregiver education/training, strengthening exercise, transfer/mobility retraining  Therapy Frequency (OT): 5 times/wk  Plan of Care Review  Plan of Care Reviewed With: patient  Progress: no change  Outcome Evaluation: Patient presents with limitations in self-care, functional transfers, balance, and endurance. She would benefit from continued skilled occupational therapy services to maximize independence with ADLs/functional transfers.     Time Calculation:   Evaluation Complexity (OT)  Review Occupational Profile/Medical/Therapy History Complexity: brief/low complexity  Assessment, Occupational Performance/Identification of Deficit Complexity: 3-5 performance deficits  Clinical Decision Making Complexity (OT): problem focused assessment/low complexity  Overall Complexity of Evaluation (OT): low complexity     Time Calculation- OT       Row Name 06/27/25 1358             Time Calculation- OT    OT Received On 06/27/25  -LF      OT Goal Re-Cert Due Date 07/06/25  -LF         Untimed Charges    OT Eval/Re-eval Minutes 48  -LF         Total Minutes    Untimed Charges Total Minutes 48  -LF       Total Minutes 48  -LF                User Key  (r)  = Recorded By, (t) = Taken By, (c) = Cosigned By      Initials Name Provider Type     Perlita Mckay OT Occupational Therapist                  Therapy Charges for Today       Code Description Service Date Service Provider Modifiers Qty    [id number]  OT EVAL LOW COMPLEXITY 4 6/27/2025 Perlita Mckay OT GO 1                 Perlita Mckay OT  6/27/2025

## 2025-06-27 NOTE — PLAN OF CARE
Goal Outcome Evaluation:  Plan of Care Reviewed With: patient        Progress: no change  Outcome Evaluation: AOx4, VSS, up x1-2 assist to bsc. Patient incont of bowel, frequent bowel movements (mixed consistency). Cdiff sample sent, negative result. No complaints of pain. PT/OT consulted. Patient has low PO intake, encouraged patient to increase fluids. No acute changes.

## 2025-06-27 NOTE — PLAN OF CARE
Goal Outcome Evaluation:           Progress: improving  Outcome Evaluation: Multiple bowel movements during night. No issues during shift. Skin care to buttock after bowel movements. Eleazar Hayden RN

## 2025-06-27 NOTE — CONSULTS
Patient Name: Charlette Rai  YOB: 1935  MRN: [id number]  Admission date: 6/24/2025  Reason for Encounter: Malnutrition Assessment    [facility] Clinical Nutrition Assessment     Subjective    Subjective Information     Follow up 6/27  Pt reports 30% intake of morning meal tray.  Endorses nausea and diarrhea.     Pt did not screen at risk but was identified with s/s of malnutrition during rounds. Noted pt to have muscle loss to upper body and significant acute wt loss. Admitted with coffee ground/black emesis, underwent EGD. EGD identified pt s/p Billroth I. Constipation present with dark liquid BM. ESRD on HD. Pt on clear liquid diet which provides very few kcals, additional restrictions not appropriate unless absolutely necessary. Will liberalize fat restriction. No phos labs ordered this admit, but nephro reports hyperphosphatemia managed with HD, recommend removing phos restriction while on clears but will defer to nephro.     Assessment    H&P and Current Problems      H&P  Past Medical History:   Diagnosis Date    Allergic rhinitis     Anaphylactic shock, unspecified, initial encounter 12/05/2023    Anemia     Arthritis     Asthma     USE INHALERS AND NEBULIZERS    Back pain     Bladder disorder     Cancer     LEFT LUNG CANCER 2005 SURGERY AND CHEMO DONE  AND CURRENTLY 4/ 14/22  RIGHT LUNG CANCER HAS ONLY RECEIVED RADIATION THUS FAR    Chronic kidney disease     stage 3    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 DENIES CP BUT DOES HAVE SOA CHRONIC ISSUE COPD/LUNG CANCER    COPD (chronic obstructive pulmonary disease)     FOLLOWED BY DR MARIAJOSE BAILEY    Coronary artery disease     DENIES CP BUT DOES GET SOA MOST OF THE TIME WITH EXERTION BUT OCC AT REST CHRONIC ISSUE COPD/LUNG CANCER    Deep vein thrombosis     Diabetes mellitus      DOES NOT CHECK BS DAILY    Disease of thyroid gland     HYPOTHYROIDISM    Essential hypertension     Gastric ulcer     GERD (gastroesophageal reflux disease)     Heart murmur     History of transfusion     NO ISSUES POST TRANSFUSION WAS MANY YEARS AGO    Hyperlipemia     Leukocytopenia     FOLLOWED BY DR MIR BAILEY    Limb swelling     Lumbago     Lumbar spinal stenosis     Lung cancer     Migraine headache     Multiple joint pain     On home oxygen therapy     4L/NC PRN    Osteopenia     PNA (pneumonia) 05/04/2024    Pseudomonas pneumonia 04/29/2024    Reflux esophagitis     Shortness of breath     Thyroid nodule     HAS ULTRASOUND YEARLY BEING MONITORED    Vascular disease     Vitamin D deficiency       Past Surgical History:   Procedure Laterality Date    ABDOMINAL SURGERY      ANGIOPLASTY RENAL ARTERY Left 06/14/2024    stent placement    APPENDECTOMY      ARTERIOGRAM N/A 6/14/2024    Procedure: Arteriogram, right radial access, aortogram and renal arteriogram with possible intervention.;  Surgeon: Dio Miguel MD;  Location: [facility] CATH INVASIVE LOCATION;  Service: Peripheral Vascular;  Laterality: N/A;    ARTERIOVENOUS FISTULA/SHUNT SURGERY Left 05/10/2022    Procedure: Left basilic vein transposition;  Surgeon: Wan Barksdale MD;  Location: [facility] MAIN OR;  Service: Vascular;  Laterality: Left;    ARTERIOVENOUS FISTULA/SHUNT SURGERY Left 03/23/2023    Procedure: Ligation of left arm arteriovenous fistula;  Surgeon: Wan Barksdale MD;  Location: [facility] MAIN OR;  Service: Vascular;  Laterality: Left;    ARTERIOVENOUS FISTULA/SHUNT SURGERY Left 11/30/2023    Procedure: Creation of left arm arteriovenous graft;  Surgeon: Wan Barksdale MD;  Location: [facility] MAIN OR;  Service: Vascular;  Laterality: Left;    BRONCHOSCOPY N/A 04/24/2024    Procedure: BRONCHOSCOPY WITH BAL AND WASHINGS;  Surgeon: Khalif Yanez MD;  Location: [facility] ENDOSCOPY;  Service: Pulmonary;  Laterality: N/A;  MUCUS PLUGGING    CARDIAC  "CATHETERIZATION  1996    CARDIAC SURGERY      CARDIAC SURGERY      fluid drained from heart    CATARACT EXTRACTION, BILATERAL  2003    COLONOSCOPY  2014    ENDOSCOPY  2016 2019    ENDOSCOPY N/A 6/25/2025    Procedure: ESOPHAGOGASTRODUODENOSCOPY;  Surgeon: Tanvi Del Valle MD;  Location: [facility] ENDOSCOPY;  Service: Gastroenterology;  Laterality: N/A;  gastritis, esophagitis    FEMORAL ARTERY STENT Bilateral     HYSTERECTOMY      LEG THROMBECTOMY/EMBOLECTOMY Left 1/29/2025    Procedure: LEFT LOWER EXTREMITY ANGIOGRAM;  Surgeon: Jaden Castañeda MD;  Location: [facility] HYBRID OR;  Service: Vascular;  Laterality: Left;    LUNG BIOPSY Left 2005    lobectomy upper lung caner    LUNG VOLUME REDUCTION      OTHER SURGICAL HISTORY      artifical joints/limbs    REPLACEMENT TOTAL KNEE Left 2016    SHUNT O GRAM N/A 1/13/2025    Procedure: dialysis shuntogram;  Surgeon: Court Valente MD;  Location: [facility] CATH INVASIVE LOCATION;  Service: Peripheral Vascular;  Laterality: N/A;    UPPER GASTROINTESTINAL ENDOSCOPY        Current Problems   Admission Diagnosis:  GI bleed [K92.2]  Gastrointestinal hemorrhage, unspecified gastrointestinal hemorrhage type [K92.2]    Problem List:    GI bleed    Severe protein-calorie malnutrition      Other Applicable Nutrition Information:   Billroth I     Anthropometrics      BMI, Height, Weight Estimated body mass index is 22.23 kg/m² as calculated from the following:    Height as of this encounter: 165.1 cm (65\").    Weight as of this encounter: 60.6 kg (133 lb 9.6 oz).    Weight Method: Bed scale       Trending Weight Changes weight loss of 5 lbs (3%) over 2 week(s)    Significant?  Yes       Weight History  Wt Readings from Last 20 Encounters:   06/27/25 0536 60.6 kg (133 lb 9.6 oz)   06/25/25 0356 64.8 kg (142 lb 13.7 oz)   06/24/25 1804 62.8 kg (138 lb 7.2 oz)   06/17/25 1130 66.7 kg (147 lb)   06/06/25 1755 66.7 kg (147 lb 0.8 oz)   06/03/25 1410 66.7 kg (147 lb)   05/13/25 1308 64.8 " kg (142 lb 13.7 oz)   04/22/25 1309 65.2 kg (143 lb 12.8 oz)   04/22/25 1412 64.9 kg (143 lb)   04/01/25 0954 64 kg (141 lb 3.2 oz)   03/25/25 1111 61.6 kg (135 lb 12.8 oz)   03/18/25 1152 61.7 kg (136 lb)   03/03/25 1458 63 kg (138 lb 14.2 oz)   02/28/25 1515 62.1 kg (136 lb 12.7 oz)   02/26/25 1448 66 kg (145 lb 9.8 oz)   02/25/25 1236 63.5 kg (140 lb)   02/17/25 1448 64.8 kg (142 lb 13.7 oz)   02/06/25 1112 64.8 kg (142 lb 13.7 oz)   02/04/25 1013 64.4 kg (142 lb)   02/04/25 1040 64.4 kg (142 lb)   01/27/25 0727 64.5 kg (142 lb 3.2 oz)   01/26/25 2147 66.6 kg (146 lb 13.2 oz)   01/13/25 1350 65.2 kg (143 lb 11.8 oz)        Labs      Comment: Reviewed.      Results from last 7 days   Lab Units 06/27/25  0426 06/26/25  0246 06/25/25  0118 06/24/25  1842   SODIUM mmol/L 133* 134* 134* 133*   POTASSIUM mmol/L 4.5 3.7 4.1 4.3   GLUCOSE mg/dL 46* 80 101* 121*   BUN mg/dL 24.9* 44.3* 37.2* 33.0*   CREATININE mg/dL 2.56* 3.51* 2.84* 2.65*   CALCIUM mg/dL 8.0* 8.1* 8.5* 8.2*   PHOSPHORUS mg/dL 3.8 4.0  --   --    MAGNESIUM mg/dL 2.0 2.0  --   --    ALBUMIN g/dL 2.5* 2.6* 3.0* 3.3*   LACTATE mmol/L  --   --   --  1.1   BILIRUBIN mg/dL 0.3 0.4 0.5 0.5   ALK PHOS U/L 276* 238* 296* 308*   AST (SGOT) U/L 57* 30 40* 45*   ALT (SGPT) U/L 26 13 19 21     Results from last 7 days   Lab Units 06/27/25  0426 06/26/25  0246 06/25/25  0636 06/25/25  0117   PLATELETS 10*3/mm3 179 167  --  214   HEMOGLOBIN g/dL 9.0* 7.9* 8.4* 7.1*  7.1*   HEMATOCRIT % 31.0* 26.0* 26.7* 23.0*  23.3*   IRON mcg/dL  --  33*  --   --      Lab Results   Component Value Date    HGBA1C 4.9 04/16/2025          Medications       Scheduled Medications amLODIPine, 5 mg, Oral, Daily  aspirin, 81 mg, Oral, Daily  budesonide, 0.5 mg, Nebulization, BID - RT  carvedilol, 25 mg, Oral, BID With Meals  clopidogrel, 75 mg, Oral, Daily  epoetin mita/mita-epbx, 10,000 Units, Intravenous, Once per day on Monday Wednesday Friday  isosorbide mononitrate, 30 mg, Oral,  Nightly  levothyroxine, 50 mcg, Oral, QAM AC  losartan, 50 mg, Oral, BID  mupirocin, 1 Application, Each Nare, BID  pantoprazole, 40 mg, Intravenous, BID AC  rosuvastatin, 10 mg, Oral, Nightly  [Held by provider] sevelamer, 800 mg, Oral, TID With Meals  sodium chloride, 10 mL, Intravenous, Q12H        Infusions      PRN Medications   acetaminophen    dextrose    dextrose    glucagon (human recombinant)    heparin (porcine)    hydrALAZINE    ipratropium-albuterol    nitroglycerin    ondansetron    sodium chloride    sodium chloride     Physical Findings      Chewing/Swallowing  Teeth Status: Mouth/Teeth WDL: .WDL except, teeth Teeth Symptoms: tooth/teeth missing  Chewing/Swallowing Issues: No issues identified at this time   Edema  Generalized Edema: 1+ (Trace)         Leg, Left Edema: 1+ (Trace)               Bowel Function  Stool Output  Stool Unmeasured Occurrence: 1 (06/27/25 1148)  Bowel Incontinence: Yes (06/27/25 1148)  Stool Amount: Small (06/27/25 1148)  Stool Consistency: formed, loose (PT. HAD SOME FORMED BOWL MOVEMENT WITH LOOSE.) (06/27/25 1148)  Perineal Care: absorbent brief/pad changed, perineum cleansed, skin sealant/barrier applied (06/27/25 1148)  Last Bowel Movement: 06/26/25   I/Os  Intake & Output (last 3 days)         06/24 0701  06/25 0700 06/25 0701  06/26 0700 06/26 0701  06/27 0700 06/27 0701  06/28 0700    P.O.   355 320    Blood 404       IV Piggyback 1000       Total Intake(mL/kg) 1404 (21.7)  355 (5.9) 320 (5.3)    Urine (mL/kg/hr) 1100 275 (0.2)      Stool  0 0     Blood  0      Dialysis   2200     Total Output [id number]     Net +304 -275 -1845 +320            Urine Unmeasured Occurrence   1 x     Stool Unmeasured Occurrence  2 x 5 x 6 x           Lines, Drains, Airways, & Wounds       Active LDAs       Name Placement date Placement time Site Days Last dressing change    Peripheral IV 06/25/25 0200 20 G Anterior;Right Forearm 06/25/25  0200  Forearm  less than 1 06/25/25 0210  (12.20 hrs)    Hemodialysis Cath Double 01/13/25  1505  Subclavian  162     Wound 01/31/25 1032 Right posterior back 01/31/25  1032  -- 145     Wound Bilateral midline sacral spine Pressure Injury --  --  -- --     Wound Left posterior heel Pressure Injury --  --  -- --     Wound Right posterior heel Pressure Injury --  --  -- --     Wound Left lower leg Traumatic Abrasion --  --  -- --     Wound Left anterior knee Surgical Closed Surgical Incision --  --  -- --     Wound Left anterior greater trochanter Surgical Closed Surgical Incision --  --  -- --                       Nutrition Focused Physical Exam     Trending NFPE Completed 6/25     Malnutrition Severity Assessment      Patient meets criteria for : (S) Severe Malnutrition     (1)   Estimated Needs      Energy Requirements    Weight for Calculation 60.6   Method for Estimation  30 kcals/kg   Daily Needs (kcal/day) 1818 kcals/d   Protein Requirements    Weight for Calculation 60.6   Method for Estimation 1.2-1.5 gm/kg   Daily Needs (g/day) 73-91 g/d   Fluid Requirements     Method for Estimation 1 mL/kcal    Daily Needs (mL/day) 1.5-1.8 L/d     Current Nutrition Orders & Evaluation of Intake      Oral Nutrition     Food Allergies  and Intolerances NKFA   Current PO Diet Diet: Liquid; Full Liquid; Fluid Consistency: Thin (IDDSI 0)   Oral Nutrition Supplement None     Trending % PO Intake No intake documented   (2)  Assessment & Plan   Nutrition Diagnosis and Goals       Nutrition Diagnosis 1 Severe acute disease or injury related malnutrition related to GI dysfunction as evidenced by coffee ground emesis, wt loss, muscle loss      Nutrition Diagnosis 2 None        Goal(s) Establish PO Intake, Accepts Oral Nutrition Supplement, PO Diet Advances When Medically Appropriate , and No Significant Weight Loss     Nutrition Intervention and Prescription       Intervention  Liberalize diet, Start oral nutrition supplement, Continue to monitor for plan of care, and  Completed NFPE      Diet Prescription Advance to High Protein, Low Phosphorous diet as tolerated  Liberalize current diet to Clear Liquid, Low Phos    - Recommend removing phos restriction     Supplement Prescription Add Boost Breeze TID (250 kcal, 9 g pro)    Education Provided     (3)  Monitoring/Evaluation       Monitor/Evaluation PO Intake, Oral Nutrition Supplement Intake, Weight, and GI Status     RD Follow-Up Encounter 3-5 days     Electronically signed by:  Francis Hung RD  06/27/25 14:35 EDT

## 2025-06-27 NOTE — PROGRESS NOTES
[facility]     Nephrology Progress Note      Patient Name: Charlette Rai  : 1935  MRN: [id number]  Primary Care Physician:  Brennan Mosqueda MD  Date of admission: 2025    Subjective   Subjective     Interval History:  Patient Reports not feeling well this morning.  Tired.  Had diarrhea overnight, multiple bowel movements.    Shortness of breath is about the same.  Tolerating p.o.  Had transient hypoglycemia earlier today, was given orange juice.  Out of ICU.    Review of Systems   All systems were reviewed and negative except for: Was mentioned above.    Objective   Objective     Vitals:   Temp:  [97.3 °F (36.3 °C)-97.9 °F (36.6 °C)] 97.9 °F (36.6 °C)  Heart Rate:  [84-95] 87  Resp:  [14-18] 16  BP: ()/(43-68) 104/68  Flow (L/min) (Oxygen Therapy):  [1] 1  Physical Exam:   Constitutional: Awake, alert, no distress, pale.  Weak and frail.   Eyes: sclerae anicteric, no conjunctival injection   HENT: mucous membranes moist   Neck: Supple, no thyromegaly, no lymphadenopathy, trachea midline, No JVD   Respiratory: Tachypneic, using accessory muscles, decreased to auscultation bilaterally.   Cardiovascular: RRR, no murmurs, rubs, or gallops.   Gastrointestinal: Positive bowel sounds, soft, nontender, nondistended   Musculoskeletal: Trace edema on the left side, no clubbing or cyanosis, left leg/thigh pain.   Psychiatric: Appropriate affect, cooperative   Neurologic: Oriented x 3, moving all extremities, Cranial Nerves grossly intact, speech clear   Skin: warm and dry, no rashes   [facility] TDC in place.    Result Review    Result Reviewed:  I have personally reviewed the results from the time of this admission to 2025 08:56 EDT and agree with these findings:  [x]  Laboratory  []  Microbiology  [x]  Radiology  []  EKG/Telemetry   []  Cardiology/Vascular   []  Pathology  []  Old records  []  Other:        Lab 25  0426 25  0246 25  0118 25  1842   SODIUM 133* 134* 134*  133*   POTASSIUM 4.5 3.7 4.1 4.3   CHLORIDE 98 96* 93* 92*   CO2 20.1* 26.2 27.6 29.5*   BUN 24.9* 44.3* 37.2* 33.0*   CREATININE 2.56* 3.51* 2.84* 2.65*   GLUCOSE 46* 80 101* 121*   EGFR 17.6* 12.0* 15.5* 16.9*   ANION GAP 14.9 11.8 13.4 11.5   MAGNESIUM 2.0 2.0  --   --    PHOSPHORUS 3.8 4.0  --   --            Most notable findings include: As above.    Assessment & Plan   Assessment / Plan       Active Hospital Problems:  Active Hospital Problems    Diagnosis     **GI bleed     Severe protein-calorie malnutrition    ESRD  Recent hip fracture  Severe anemia  Hypertension  History of renal artery stenosis, status post stenting 6/14/2024.    Assessment and Plan:    - ESRD, dialysis 3 times a week while inpatient through right IJ TDC.  Volume status and electrolytes are stable.  Next dialysis tomorrow.  UF 2.5 kg as tolerated.  - Recent fall and hip fracture on the left side, status post repair, was in rehab prior to admission.  - Severe anemia, multifactorial including coffee-ground emesis.  Iron studies are adequate.  Continue Epogen with dialysis.  Was on long-acting CASSANDRA as outpatient.  Status post transfusion.  Status post EGD.  GI following.  - Hypertension: Blood pressure is acceptable.  Monitor.  - Secondary hyperparathyroidism and hyperphosphatemia, being addressed with dialysis.  Low phosphorus diet.  Continue sevelamer with meals.  - Constipation, now went into diarrhea all night.  per primary/GI.  - Severe peripheral arterial disease including lower extremities and renal artery stenosis, status post PTA and stent 6/14/2024. Given GI bleed, Plavix is on hold for now.  - Lung cancer.  Status post radiation.  - Type 2 diabetes with complications, per primary.  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      Discussed with nursing.    Will follow, please call with any questions!    Electronically signed by Edi García MD, 6/27/2025, 08:56 EDT.    [phone]

## 2025-06-27 NOTE — PROGRESS NOTES
Pulmonary / Critical Care Progress Note      Patient Name: Charlette Rai  : 1935  MRN: [id number]  Primary Care Physician:  Brennan Mosqueda MD  Date of admission: 2025    Subjective   Subjective   Follow-up for GI bleed, acute anemia    Over past 24 hours: Transferred out of ICU.  Hemoglobin stable.  Continues on ceftriaxone.  Resumed home aspirin.  Plavix remains on hold.    Overnight, episode of hypoglycemia.    This morning,   Lying in bed  Feeling somewhat better  Having some episodes of diarrhea  Currently on 1 L nasal cannula  Denies dyspnea or cough  Hemoglobin stable  No fever or chills  Remains weak and fatigued    Objective   Objective     Vitals:   Temp:  [97.3 °F (36.3 °C)-97.9 °F (36.6 °C)] 97.9 °F (36.6 °C)  Heart Rate:  [84-99] 87  Resp:  [12-18] 16  BP: ()/(42-63) 95/50  Flow (L/min) (Oxygen Therapy):  [1] 1    Physical Exam   Vital Signs Reviewed   General: Chronically ill-appearing, elderly female, Alert, NAD, lying in bed.    Chest:  good aeration, diminished to auscultation bilaterally, no work of breathing noted  CV: RRR, no MGR, pulses 2+, equal.  Abd:  Soft, NT, ND, + BS, no HSM  EXT:  no clubbing, no cyanosis, no edema  Neuro:  A&Ox3, CN grossly intact, no focal deficits.  Skin: No rashes or lesions noted      Result Review    Result Review:  I have personally reviewed the results from the time of this admission to 2025 07:48 EDT and agree with these findings:  [x]  Laboratory  [x]  Microbiology  [x]  Radiology  [x]  EKG/Telemetry   []  Cardiology/Vascular   []  Pathology  []  Old records  []  Other:  Most notable findings include:         Lab 25  0426 25  0246 25  0636 25  0118 25  0117 25  1842   WBC 13.50* 12.97*  --   --  18.98* 14.36*   HEMOGLOBIN 9.0* 7.9* 8.4*  --  7.1*  7.1* 7.0*   HEMATOCRIT 31.0* 26.0* 26.7*  --  23.0*  23.3* 22.8*   PLATELETS 179 167  --   --  214 196   SODIUM 133* 134*  --  134*  --  133*    POTASSIUM 4.5 3.7  --  4.1  --  4.3   CHLORIDE 98 96*  --  93*  --  92*   CO2 20.1* 26.2  --  27.6  --  29.5*   BUN 24.9* 44.3*  --  37.2*  --  33.0*   CREATININE 2.56* 3.51*  --  2.84*  --  2.65*   GLUCOSE 46* 80  --  101*  --  121*   CALCIUM 8.0* 8.1*  --  8.5*  --  8.2*   PHOSPHORUS 3.8 4.0  --   --   --   --    TOTAL PROTEIN 5.5* 5.2*  --  5.9*  --  5.8*   ALBUMIN 2.5* 2.6*  --  3.0*  --  3.3*   GLOBULIN 3.0 2.6  --  2.9  --  2.5     XR Chest 1 View  Result Date: 6/24/2025  XR CHEST 1 VW Date of Exam: 6/24/2025 9:20 PM EDT Indication: Cough. History of lung cancer. Comparison: 6/6/2025 Findings: Dialysis catheter is at the cavoatrial junction. Chronic scarring noted right midlung. The right lung is otherwise clear. There is volume loss in the left hemithorax with shift of the midline structures to the left, unchanged. There is atherosclerotic disease in the aorta, and there are calcified left-sided pleural plaques, also unchanged. There is slight improvement in aeration of the left lung relative to the prior study. No pneumothorax on either side of the chest. A vascular stent is present in the left upper extremity.     Impression: 1.Slight improvement in aeration of the left lung relative to the prior study. 2.Otherwise no significant interval change. Electronically Signed: Weston Castillo MD  6/24/2025 9:31 PM EDT  Workstation ID: [id number]    CT Head Without Contrast  Result Date: 6/6/2025  CT HEAD WO CONTRAST, CT CERVICAL SPINE WO CONTRAST Date of Exam: 6/6/2025 6:27 PM EDT Indication: head trauma. Comparison: None available. Technique: Axial CT images were obtained of the head and cervical spine without contrast administration.  Reconstructed coronal and sagittal images were also obtained. Automated exposure control and iterative construction methods were used. Findings: Head: There is a contusion of the left parietal scalp. No acute calvarial fracture. No acute intracranial hemorrhage. No acute large  territory infarct. There are scattered and patchy areas of subcortical and periventricular white matter hypodensity which  is nonspecific and can be seen in the setting of chronic small vessel ischemic change. No extra-axial collections. No midline shift or herniation. Normal size and configuration of the ventricles. Normal appearance of the orbits. The paranasal sinuses are clear. There is intracranial atherosclerosis. Bilateral mastoid air cell effusions are noted. No acute or suspicious bony findings. Cervical spine: The paravertebral soft tissues are normal. No acute fracture. No loss of vertebral body height. No traumatic subluxation. There is mild to moderate degenerative disc disease in the lower cervical spine. There is bilateral multilevel facet  arthropathy worst in the mid and upper cervical spine. There is a right IJ central venous catheter. There is emphysema. There is heavy atherosclerosis of the thoracic aorta. There are pleural plaques in the left lung. Scarring in the mid right lung. Treatment changes in the left apex.     Impression: Impression: Left parietal scalp contusion. No acute intracranial findings. Chronic and senescent changes as above. No acute fracture or traumatic malalignment in the cervical spine. Electronically Signed: Elliott Centeno MD  6/6/2025 7:01 PM EDT  Workstation ID: [id number]     Results for orders placed during the hospital encounter of 04/22/25    Adult Transthoracic Echo Complete W/ Cont if Necessary Per Protocol    Interpretation Summary    Left ventricular systolic function is normal. Calculated left ventricular EF = 60.5%    Left ventricular diastolic function is consistent with (grade Ia w/high LAP) impaired relaxation.    The left atrial cavity is mildly dilated.    Mild to moderate aortic valve stenosis is present.    Aortic valve mean pressure gradient is 16 mmHg.    Mild to moderate mitral valve stenosis is present.    Estimated right ventricular systolic  pressure from tricuspid regurgitation is mildly elevated (35-45 mmHg).     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  2 to 3 L baseline  - Completed course of ceftriaxone  - Continue Pulmicort and as needed DuoNebs  - GI on board.  Appreciate assistance.  - Trend H&H.  Transfuse for hemoglobin less than 7.  - Continue aspirin and Plavix  - Nephrology on board.  Appreciate assistance.  HD timing per them.  - Encourage mobilization.  Out of bed to chair.  - PT/OT on board.  Appreciate assistance.    Unless otherwise needed, critical care/pulmonary will sign off at this time. Please call with any questions or concerns.    VTE Prophylaxis:  Pharmacologic & mechanical VTE prophylaxis orders are present.        CODE STATUS:   Code Status (Patient has no pulse and is not breathing): CPR (Attempt to Resuscitate)  Medical Interventions (Patient has pulse or is breathing): Limited Support  Medical Intervention Limits: No intubation (DNI)      I personally reviewed pertinent labs, imaging and provider notes. Discussed with bedside nurse and will discuss with primary service.       Electronically signed by Khalif Yanez MD, 6/27/2025, 07:48 EDT.    Electronically signed by DAT Carballo, 06/27/25, 2:29 PM EDT.    This visit was performed by BOTH a physician and an APC. I personally evaluated and examined the patient. I performed all aspects of MDM as documented. , I have reviewed and confirmed the accuracy of the patient's history as documented in this note., and I have reexamined the patient and  the results are consistent with the previously documented exam. I have updated the documentation as necessary.     Electronically signed by Khalif Yanez MD, 06/27/25, 4:44 PM EDT.

## 2025-06-28 LAB
ANION GAP SERPL CALCULATED.3IONS-SCNC: 8.4 MMOL/L (ref 5–15)
BH CV LOW VAS RIGHT POSTERIOR TIBIAL VESSEL: 1
BH CV LOWER VASCULAR LEFT COMMON FEMORAL AUGMENT: NORMAL
BH CV LOWER VASCULAR LEFT COMMON FEMORAL COMPETENT: NORMAL
BH CV LOWER VASCULAR LEFT COMMON FEMORAL COMPRESS: NORMAL
BH CV LOWER VASCULAR LEFT COMMON FEMORAL PHASIC: NORMAL
BH CV LOWER VASCULAR LEFT COMMON FEMORAL SPONT: NORMAL
BH CV LOWER VASCULAR LEFT DISTAL FEMORAL COMPRESS: NORMAL
BH CV LOWER VASCULAR LEFT GASTRONEMIUS COMPRESS: NORMAL
BH CV LOWER VASCULAR LEFT GREATER SAPH AK COMPRESS: NORMAL
BH CV LOWER VASCULAR LEFT GREATER SAPH BK COMPRESS: NORMAL
BH CV LOWER VASCULAR LEFT LESSER SAPH COMPRESS: NORMAL
BH CV LOWER VASCULAR LEFT MID FEMORAL AUGMENT: NORMAL
BH CV LOWER VASCULAR LEFT MID FEMORAL COMPETENT: NORMAL
BH CV LOWER VASCULAR LEFT MID FEMORAL COMPRESS: NORMAL
BH CV LOWER VASCULAR LEFT MID FEMORAL PHASIC: NORMAL
BH CV LOWER VASCULAR LEFT MID FEMORAL SPONT: NORMAL
BH CV LOWER VASCULAR LEFT POPLITEAL AUGMENT: NORMAL
BH CV LOWER VASCULAR LEFT POPLITEAL COMPETENT: NORMAL
BH CV LOWER VASCULAR LEFT POPLITEAL COMPRESS: NORMAL
BH CV LOWER VASCULAR LEFT POPLITEAL PHASIC: NORMAL
BH CV LOWER VASCULAR LEFT POPLITEAL SPONT: NORMAL
BH CV LOWER VASCULAR LEFT POSTERIOR TIBIAL COMPRESS: NORMAL
BH CV LOWER VASCULAR LEFT PROFUNDA FEMORAL COMPRESS: NORMAL
BH CV LOWER VASCULAR LEFT PROXIMAL FEMORAL COMPRESS: NORMAL
BH CV LOWER VASCULAR LEFT SAPHENOFEMORAL JUNCTION COMPRESS: NORMAL
BH CV LOWER VASCULAR RIGHT COMMON FEMORAL AUGMENT: NORMAL
BH CV LOWER VASCULAR RIGHT COMMON FEMORAL COMPETENT: NORMAL
BH CV LOWER VASCULAR RIGHT COMMON FEMORAL COMPRESS: NORMAL
BH CV LOWER VASCULAR RIGHT COMMON FEMORAL PHASIC: NORMAL
BH CV LOWER VASCULAR RIGHT COMMON FEMORAL SPONT: NORMAL
BH CV LOWER VASCULAR RIGHT DISTAL FEMORAL COMPRESS: NORMAL
BH CV LOWER VASCULAR RIGHT GASTRONEMIUS COMPRESS: NORMAL
BH CV LOWER VASCULAR RIGHT GREATER SAPH AK COMPRESS: NORMAL
BH CV LOWER VASCULAR RIGHT GREATER SAPH BK COMPRESS: NORMAL
BH CV LOWER VASCULAR RIGHT LESSER SAPH COMPRESS: NORMAL
BH CV LOWER VASCULAR RIGHT MID FEMORAL AUGMENT: NORMAL
BH CV LOWER VASCULAR RIGHT MID FEMORAL COMPETENT: NORMAL
BH CV LOWER VASCULAR RIGHT MID FEMORAL COMPRESS: NORMAL
BH CV LOWER VASCULAR RIGHT MID FEMORAL PHASIC: NORMAL
BH CV LOWER VASCULAR RIGHT MID FEMORAL SPONT: NORMAL
BH CV LOWER VASCULAR RIGHT PERONEAL AUGMENT: NORMAL
BH CV LOWER VASCULAR RIGHT PERONEAL COMPETENT: NORMAL
BH CV LOWER VASCULAR RIGHT PERONEAL COMPRESS: NORMAL
BH CV LOWER VASCULAR RIGHT PERONEAL PHASIC: NORMAL
BH CV LOWER VASCULAR RIGHT PERONEAL SPONT: NORMAL
BH CV LOWER VASCULAR RIGHT POPLITEAL AUGMENT: NORMAL
BH CV LOWER VASCULAR RIGHT POPLITEAL COMPETENT: NORMAL
BH CV LOWER VASCULAR RIGHT POPLITEAL COMPRESS: NORMAL
BH CV LOWER VASCULAR RIGHT POPLITEAL PHASIC: NORMAL
BH CV LOWER VASCULAR RIGHT POPLITEAL SPONT: NORMAL
BH CV LOWER VASCULAR RIGHT POSTERIOR TIBIAL COMPRESS: NORMAL
BH CV LOWER VASCULAR RIGHT POSTERIOR TIBIAL THROMBUS: NORMAL
BH CV LOWER VASCULAR RIGHT PROFUNDA FEMORAL COMPRESS: NORMAL
BH CV LOWER VASCULAR RIGHT PROXIMAL FEMORAL COMPRESS: NORMAL
BH CV LOWER VASCULAR RIGHT SAPHENOFEMORAL JUNCTION COMPRESS: NORMAL
BUN SERPL-MCNC: 35.3 MG/DL (ref 8–23)
BUN/CREAT SERPL: 10.5 (ref 7–25)
CALCIUM SPEC-SCNC: 8.2 MG/DL (ref 8.6–10.5)
CHLORIDE SERPL-SCNC: 100 MMOL/L (ref 98–107)
CO2 SERPL-SCNC: 25.6 MMOL/L (ref 22–29)
CREAT SERPL-MCNC: 3.37 MG/DL (ref 0.57–1)
DEPRECATED RDW RBC AUTO: 65.4 FL (ref 37–54)
EGFRCR SERPLBLD CKD-EPI 2021: 12.6 ML/MIN/1.73
ERYTHROCYTE [DISTWIDTH] IN BLOOD BY AUTOMATED COUNT: 17.6 % (ref 12.3–15.4)
GLUCOSE BLDC GLUCOMTR-MCNC: 100 MG/DL (ref 70–99)
GLUCOSE BLDC GLUCOMTR-MCNC: 91 MG/DL (ref 70–99)
GLUCOSE BLDC GLUCOMTR-MCNC: 95 MG/DL (ref 70–99)
GLUCOSE BLDC GLUCOMTR-MCNC: 95 MG/DL (ref 70–99)
GLUCOSE BLDC GLUCOMTR-MCNC: 98 MG/DL (ref 70–99)
GLUCOSE BLDC GLUCOMTR-MCNC: 98 MG/DL (ref 70–99)
GLUCOSE SERPL-MCNC: 91 MG/DL (ref 65–99)
HCT VFR BLD AUTO: 25.7 % (ref 34–46.6)
HGB BLD-MCNC: 7.8 G/DL (ref 12–15.9)
MAGNESIUM SERPL-MCNC: 2.1 MG/DL (ref 1.6–2.4)
MCH RBC QN AUTO: 30.8 PG (ref 26.6–33)
MCHC RBC AUTO-ENTMCNC: 30.4 G/DL (ref 31.5–35.7)
MCV RBC AUTO: 101.6 FL (ref 79–97)
PHOSPHATE SERPL-MCNC: 4 MG/DL (ref 2.5–4.5)
PLATELET # BLD AUTO: 161 10*3/MM3 (ref 140–450)
PMV BLD AUTO: 9.6 FL (ref 6–12)
POTASSIUM SERPL-SCNC: 4.7 MMOL/L (ref 3.5–5.2)
RBC # BLD AUTO: 2.53 10*6/MM3 (ref 3.77–5.28)
SODIUM SERPL-SCNC: 134 MMOL/L (ref 136–145)
WBC NRBC COR # BLD AUTO: 6.61 10*3/MM3 (ref 3.4–10.8)

## 2025-06-28 PROCEDURE — 80048 BASIC METABOLIC PNL TOTAL CA: CPT | Performed by: INTERNAL MEDICINE

## 2025-06-28 PROCEDURE — 94799 UNLISTED PULMONARY SVC/PX: CPT

## 2025-06-28 PROCEDURE — 25010000002 HEPARIN (PORCINE) PER 1000 UNITS: Performed by: INTERNAL MEDICINE

## 2025-06-28 PROCEDURE — 25010000002 ONDANSETRON PER 1 MG: Performed by: INTERNAL MEDICINE

## 2025-06-28 PROCEDURE — 99233 SBSQ HOSP IP/OBS HIGH 50: CPT | Performed by: INTERNAL MEDICINE

## 2025-06-28 PROCEDURE — 25010000002 EPOETIN ALFA PER 1000 UNITS: Performed by: INTERNAL MEDICINE

## 2025-06-28 PROCEDURE — 83735 ASSAY OF MAGNESIUM: CPT | Performed by: INTERNAL MEDICINE

## 2025-06-28 PROCEDURE — 85027 COMPLETE CBC AUTOMATED: CPT | Performed by: INTERNAL MEDICINE

## 2025-06-28 PROCEDURE — 84100 ASSAY OF PHOSPHORUS: CPT | Performed by: INTERNAL MEDICINE

## 2025-06-28 PROCEDURE — 82948 REAGENT STRIP/BLOOD GLUCOSE: CPT | Performed by: INTERNAL MEDICINE

## 2025-06-28 PROCEDURE — 94664 DEMO&/EVAL PT USE INHALER: CPT

## 2025-06-28 PROCEDURE — 82948 REAGENT STRIP/BLOOD GLUCOSE: CPT

## 2025-06-28 RX ORDER — LOPERAMIDE HYDROCHLORIDE 2 MG/1
2 CAPSULE ORAL 4 TIMES DAILY PRN
Status: DISCONTINUED | OUTPATIENT
Start: 2025-06-28 | End: 2025-07-03 | Stop reason: HOSPADM

## 2025-06-28 RX ADMIN — LOPERAMIDE HYDROCHLORIDE 2 MG: 2 CAPSULE ORAL at 23:53

## 2025-06-28 RX ADMIN — ASPIRIN 81 MG: 81 TABLET, COATED ORAL at 12:24

## 2025-06-28 RX ADMIN — ERYTHROPOIETIN 10000 UNITS: 10000 INJECTION, SOLUTION INTRAVENOUS; SUBCUTANEOUS at 11:22

## 2025-06-28 RX ADMIN — AMLODIPINE BESYLATE 5 MG: 5 TABLET ORAL at 12:24

## 2025-06-28 RX ADMIN — HEPARIN SODIUM 5000 UNITS: 5000 INJECTION INTRAVENOUS; SUBCUTANEOUS at 20:33

## 2025-06-28 RX ADMIN — ROSUVASTATIN CALCIUM 10 MG: 5 TABLET, FILM COATED ORAL at 20:32

## 2025-06-28 RX ADMIN — HEPARIN SODIUM 5000 UNITS: 5000 INJECTION INTRAVENOUS; SUBCUTANEOUS at 05:21

## 2025-06-28 RX ADMIN — BUDESONIDE 0.5 MG: 0.5 SUSPENSION RESPIRATORY (INHALATION) at 06:37

## 2025-06-28 RX ADMIN — LOPERAMIDE HYDROCHLORIDE 2 MG: 2 CAPSULE ORAL at 14:24

## 2025-06-28 RX ADMIN — MUPIROCIN 1 APPLICATION: 20 OINTMENT TOPICAL at 20:32

## 2025-06-28 RX ADMIN — LOSARTAN POTASSIUM 50 MG: 50 TABLET, FILM COATED ORAL at 12:24

## 2025-06-28 RX ADMIN — Medication 10 ML: at 20:32

## 2025-06-28 RX ADMIN — CLOPIDOGREL BISULFATE 75 MG: 75 TABLET ORAL at 12:24

## 2025-06-28 RX ADMIN — HEPARIN SODIUM 3200 UNITS: 1000 INJECTION INTRAVENOUS; SUBCUTANEOUS at 11:42

## 2025-06-28 RX ADMIN — CARVEDILOL 25 MG: 25 TABLET, FILM COATED ORAL at 17:06

## 2025-06-28 RX ADMIN — ACETAMINOPHEN 650 MG: 325 TABLET ORAL at 23:53

## 2025-06-28 RX ADMIN — ONDANSETRON 4 MG: 2 INJECTION INTRAMUSCULAR; INTRAVENOUS at 10:08

## 2025-06-28 RX ADMIN — PANTOPRAZOLE SODIUM 40 MG: 40 INJECTION, POWDER, FOR SOLUTION INTRAVENOUS at 17:06

## 2025-06-28 RX ADMIN — ACETAMINOPHEN 650 MG: 325 TABLET ORAL at 13:48

## 2025-06-28 RX ADMIN — BUDESONIDE 0.5 MG: 0.5 SUSPENSION RESPIRATORY (INHALATION) at 20:50

## 2025-06-28 NOTE — PROGRESS NOTES
[facility]     Nephrology Progress Note      Patient Name: Charlette Rai  : 1935  MRN: [id number]  Primary Care Physician:  Brennan Mosqueda MD  Date of admission: 2025    Subjective   Subjective     Interval History:  Not feeling at all well this morning.  Having abdominal cramping, thinks may be recurrent constipation.  Tolerated dialysis okay this morning.  UF 2 kg.,  Not eating.  Some nausea on and off.  Respiratory status improved.    Hemodynamically stable.    Review of Systems   All systems were reviewed and negative except for: Was mentioned above.    Objective   Objective     Vitals:   Temp:  [97.3 °F (36.3 °C)-98.2 °F (36.8 °C)] 97.3 °F (36.3 °C)  Heart Rate:  [67-93] 93  Resp:  [16-18] 18  BP: (120-168)/(41-64) 156/46  Flow (L/min) (Oxygen Therapy):  [1] 1  Physical Exam:   Constitutional: Awake, alert, no distress, pale.  Weak and frail.  Ill-appearing.   Eyes: sclerae anicteric, no conjunctival injection   HENT: mucous membranes moist   Neck: Supple, no thyromegaly, no lymphadenopathy, trachea midline, No JVD   Respiratory: Regular breathing rate, clear, decreased to auscultation bilaterally.   Cardiovascular: RRR, no murmurs, rubs, or gallops.   Gastrointestinal: Positive bowel sounds, soft, nontender, nondistended   Musculoskeletal: No edema, no clubbing or cyanosis, left leg/thigh pain.   Psychiatric: Appropriate affect, cooperative   Neurologic: Oriented x 3, moving all extremities, Cranial Nerves grossly intact, speech clear   Skin: warm and dry, no rashes   [facility] TDC in place.    Result Review    Result Reviewed:  I have personally reviewed the results from the time of this admission to 2025 15:35 EDT and agree with these findings:  [x]  Laboratory  []  Microbiology  [x]  Radiology  []  EKG/Telemetry   []  Cardiology/Vascular   []  Pathology  []  Old records  []  Other:        Lab 25  0359 25  0426 25  0246 25  0118 25  1842   SODIUM 134*  133* 134* 134* 133*   POTASSIUM 4.7 4.5 3.7 4.1 4.3   CHLORIDE 100 98 96* 93* 92*   CO2 25.6 20.1* 26.2 27.6 29.5*   BUN 35.3* 24.9* 44.3* 37.2* 33.0*   CREATININE 3.37* 2.56* 3.51* 2.84* 2.65*   GLUCOSE 91 46* 80 101* 121*   EGFR 12.6* 17.6* 12.0* 15.5* 16.9*   ANION GAP 8.4 14.9 11.8 13.4 11.5   MAGNESIUM 2.1 2.0 2.0  --   --    PHOSPHORUS 4.0 3.8 4.0  --   --            Most notable findings include: As above.    Assessment & Plan   Assessment / Plan       Active Hospital Problems:  Active Hospital Problems    Diagnosis     **GI bleed     Severe protein-calorie malnutrition    ESRD  Recent hip fracture  Severe anemia  Hypertension  History of renal artery stenosis, status post stenting 6/14/2024.    Assessment and Plan:    - ESRD, dialysis 3 times a week while inpatient through right IJ TDC.  Volume status and electrolytes are stable.  Resume MWF schedule next week.  She appears euvolemic at this time.  - Recent fall and hip fracture on the left side, status post repair, was in rehab prior to admission.  - Severe anemia, multifactorial including coffee-ground emesis.  Iron studies are adequate.  Continue Epogen with dialysis.  Was on long-acting CASSANDRA as outpatient.  Status post transfusion.  Status post EGD.  GI following.  - Hypertension: Blood pressure is acceptable.  Monitor.  - Secondary hyperparathyroidism and hyperphosphatemia, being addressed with dialysis.  Low phosphorus diet.  Continue sevelamer with meals.  - Constipation, recurrent with intermittent diarrhea in between.  per primary/GI.   - Severe peripheral arterial disease including lower extremities and renal artery stenosis, status post PTA and stent 6/14/2024. Given GI bleed, Plavix is on hold for now. ? Abdominal angina?.  - Lung cancer.  Status post radiation.  - Type 2 diabetes with complications, per primary.  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      Will follow, please call with any questions!    Electronically signed  by Edi García MD, 6/28/2025, 15:35 EDT.    [phone]

## 2025-06-28 NOTE — PLAN OF CARE
Goal Outcome Evaluation:  Plan of Care Reviewed With: patient           Outcome Evaluation: VSS. Intermittent complaints of abdominal cramping, frequent small bowel movements. Imodium relieved symptoms briefly.

## 2025-06-28 NOTE — NURSING NOTE
Question Answer   Duration of Treatment 3.5 Hours   Access Site Tunneled Dialysis Catheter   Dialyzer Revaclear    mL/min   Dialysate Temperature (C) 36   BFR-As tolerated to a maximum of: 400 mL/min   Prime Dialyzer With NS to Priming Volume? Yes   Dialysate Solution Bath: K+ = 3 mEq, Ca = 2.5mEq   Bicarb 35 mEq   Na+ 137 meq   Fluid Removal: 2.5kg as tolerated   Notify [facility] Department of order. Who did you speak with? dialysis     Patient tolerated dialysis treatment okay with some nausea and stomach cramping, stopped fluid removal twice for cramping. Administered PRN zofran with no relief. IV epo given as ordered. Right tunneled dialysis catheter ran well, hep locked post treatment. Removed 2.2 of 2.5 kg ordered. BVP 84.0. Last blood pressure 162/45. Patient had 2 incontinent episodes of bowel, sandrita care provided. Report given to Ruddy KHAN.

## 2025-06-28 NOTE — PLAN OF CARE
Goal Outcome Evaluation:   Pt slept well during the shift. She did require a straight cath which had 625 out. There was also a drop in the hgb to 7.8 but there were no signs of bleeding noted. There were no other changes noted during the shift.

## 2025-06-28 NOTE — PROGRESS NOTES
[facility]   Hospitalist Progress Note  Date: 2025  Patient Name: Charlette Rai  : 1935  MRN: [id number]  Date of admission: 2025  Consultants:   -Gastroenterology: Dr. Tanvi Del Valle  -Nephrology: Dr. Edi García  -Pulmonology: Dr. Zbigniew Duarte, Dr. Khalif Yanez    Subjective   Subjective     Chief Complaint: Hematemesis and generalized weakness    Summary:   Charlette Rai is a 89 y.o. female with ESRD on HD, CHF, colon cancer, type 2 diabetes mellitus, hypothyroidism and hypertension presented to ED with complaints of hematemesis and worsening weakness.  Patient found to be anemic in the ED.  CT abdomen pelvis obtained that showed large colonic stool burden.  Patient admitted to ICU level of care.  Pulmonology, gastroenterology and nephrology consulted.  Patient transfused 1 unit PRBCs.  Patient underwent EGD on 2025 that showed esophagitis and gastritis.  Ceftriaxone started for UTI.    Interval Followup:   Patient seen while in dialysis.  She states that she just feels weak.  Leukocytosis improved on labs.  Patient has been afebrile.  Yesterday patient did have some complaints of lower extremity pain/discomfort, and patient says the pain is still present.  She denied any active chest pain.  Still having loose bowel movements.  Nursing with no additional acute issues to report.    Procedures:   - EGD (2025)    Antibiotics:   - Ceftriaxone    Objective   Objective     Vitals:   Temp:  [97.7 °F (36.5 °C)-98.2 °F (36.8 °C)] 97.7 °F (36.5 °C)  Heart Rate:  [67-92] 92  Resp:  [16-18] 16  BP: (120-168)/(40-64) 152/55  Flow (L/min) (Oxygen Therapy):  [1] 1  Physical Exam   Gen: Lying in bed on dialysis, chronically ill-appearing elderly female, pleasant  Resp: Good aeration, equal chest rise bilaterally  Card: Regular rhythm, tachycardic, No m/r/g  Abd: Soft, Nontender, Nondistended, + bowel sounds    Result Review    Result Review:  I have personally reviewed the results as  below and agree with these findings:  []  Laboratory:   CMP          6/26/2025    02:46 6/27/2025    04:26 6/28/2025    03:59   CMP   Glucose 80  46  91    BUN 44.3  24.9  35.3    Creatinine 3.51  2.56  3.37    EGFR 12.0  17.6  12.6    Sodium 134  133  134    Potassium 3.7  4.5  4.7    Chloride 96  98  100    Calcium 8.1  8.0  8.2    Total Protein 5.2  5.5     Albumin 2.6  2.5     Globulin 2.6  3.0     Total Bilirubin 0.4  0.3     Alkaline Phosphatase 238  276     AST (SGOT) 30  57     ALT (SGPT) 13  26     Albumin/Globulin Ratio 1.0  0.8     BUN/Creatinine Ratio 12.6  9.7  10.5    Anion Gap 11.8  14.9  8.4      CBC          6/26/2025    02:46 6/27/2025    04:26 6/28/2025    03:59   CBC   WBC 12.97  13.50  6.61    RBC 2.57  2.85  2.53    Hemoglobin 7.9  9.0  7.8    Hematocrit 26.0  31.0  25.7    .2  108.8  101.6    MCH 30.7  31.6  30.8    MCHC 30.4  29.0  30.4    RDW 18.0  18.1  17.6    Platelets 167  179  161    Phosphorus and magnesium within normal limits.  Blood glucose stable.  []  Microbiology:   []  Radiology:   [x]  EKG/Telemetry:    []  Cardiology/Vascular:    []  Pathology:  []  Old records:  []  Other:    Assessment & Plan   Assessment / Plan     Assessment:  GI bleed  Coffee-ground emesis  Acute anemia secondary to GI bleed  ESRD on hemodialysis  Urinary tract infection due to unknown infectious organism  Severe protein calorie malnutrition  Constipation  Hypertension  History of renal artery stenosis  Chronic diastolic heart failure, not acutely exacerbated  Hypothyroidism    Plan:  -Gastroenterology, Nephrology and Pulmonology consulted and following, appreciate assistance and recommendations in the care of this patient.  - Completed antibiotic treatment for UTI with ceftriaxone  -Continue aspirin and Plavix, monitor hemoglobin  -Continue appropriate home medications  -Continue home carvedilol  -Hemodialysis per nephrology  -Monitor electrolytes and renal function with BMP and magnesium level in  the AM  -Monitor WBC and Hgb with CBC in the AM  -Clinical course will dictate further management     DVT Prophylaxis: SCDs; pharmaceutical DVT prophylaxis being held secondary to anemia  GI Prophylaxis: Pantoprazole  Diet:   Diet Order   Procedures    Diet: Liquid; Full Liquid; Fluid Consistency: Thin (IDDSI 0)     Dispo: PT/OT consulted     Time spent personally reviewing patient's chart, labs and imaging, evaluating/examining the patient, discussing care plan with patient and nurse at bedside and discussing management with consultants (Gastroenterology, Nephrology and Pulmonology): 51 minutes.     Part of this note may be an electronic transcription/translation of spoken language to printed text using the Dragon dictation system.    VTE Prophylaxis:  Pharmacologic & mechanical VTE prophylaxis orders are present.      CODE STATUS:   Code Status (Patient has no pulse and is not breathing): CPR (Attempt to Resuscitate)  Medical Interventions (Patient has pulse or is breathing): Limited Support  Medical Intervention Limits: No intubation (DNI)      Electronically signed by Pieter Salgado MD, 6/28/2025, 10:20 EDT.

## 2025-06-29 LAB
ALBUMIN SERPL-MCNC: 2.6 G/DL (ref 3.5–5.2)
ANION GAP SERPL CALCULATED.3IONS-SCNC: 10.5 MMOL/L (ref 5–15)
BUN SERPL-MCNC: 18.3 MG/DL (ref 8–23)
BUN/CREAT SERPL: 7.7 (ref 7–25)
CALCIUM SPEC-SCNC: 7.8 MG/DL (ref 8.6–10.5)
CHLORIDE SERPL-SCNC: 98 MMOL/L (ref 98–107)
CO2 SERPL-SCNC: 24.5 MMOL/L (ref 22–29)
CREAT SERPL-MCNC: 2.39 MG/DL (ref 0.57–1)
DEPRECATED RDW RBC AUTO: 67.3 FL (ref 37–54)
EGFRCR SERPLBLD CKD-EPI 2021: 19.1 ML/MIN/1.73
ERYTHROCYTE [DISTWIDTH] IN BLOOD BY AUTOMATED COUNT: 18.1 % (ref 12.3–15.4)
GLUCOSE BLDC GLUCOMTR-MCNC: 76 MG/DL (ref 70–99)
GLUCOSE SERPL-MCNC: 79 MG/DL (ref 65–99)
HCT VFR BLD AUTO: 28.5 % (ref 34–46.6)
HGB BLD-MCNC: 8.7 G/DL (ref 12–15.9)
MCH RBC QN AUTO: 31.5 PG (ref 26.6–33)
MCHC RBC AUTO-ENTMCNC: 30.5 G/DL (ref 31.5–35.7)
MCV RBC AUTO: 103.3 FL (ref 79–97)
PHOSPHATE SERPL-MCNC: 3 MG/DL (ref 2.5–4.5)
PLATELET # BLD AUTO: 167 10*3/MM3 (ref 140–450)
PMV BLD AUTO: 9.6 FL (ref 6–12)
POTASSIUM SERPL-SCNC: 3.6 MMOL/L (ref 3.5–5.2)
RBC # BLD AUTO: 2.76 10*6/MM3 (ref 3.77–5.28)
SODIUM SERPL-SCNC: 133 MMOL/L (ref 136–145)
WBC NRBC COR # BLD AUTO: 12.03 10*3/MM3 (ref 3.4–10.8)

## 2025-06-29 PROCEDURE — 94799 UNLISTED PULMONARY SVC/PX: CPT

## 2025-06-29 PROCEDURE — 99233 SBSQ HOSP IP/OBS HIGH 50: CPT | Performed by: INTERNAL MEDICINE

## 2025-06-29 PROCEDURE — 82948 REAGENT STRIP/BLOOD GLUCOSE: CPT | Performed by: INTERNAL MEDICINE

## 2025-06-29 PROCEDURE — 25010000002 HEPARIN (PORCINE) PER 1000 UNITS: Performed by: INTERNAL MEDICINE

## 2025-06-29 PROCEDURE — 80069 RENAL FUNCTION PANEL: CPT | Performed by: INTERNAL MEDICINE

## 2025-06-29 PROCEDURE — 85027 COMPLETE CBC AUTOMATED: CPT | Performed by: INTERNAL MEDICINE

## 2025-06-29 PROCEDURE — 94761 N-INVAS EAR/PLS OXIMETRY MLT: CPT

## 2025-06-29 RX ORDER — ACETAMINOPHEN 325 MG/1
650 TABLET ORAL EVERY 6 HOURS
Status: DISCONTINUED | OUTPATIENT
Start: 2025-06-29 | End: 2025-07-03 | Stop reason: HOSPADM

## 2025-06-29 RX ORDER — PANTOPRAZOLE SODIUM 40 MG/1
40 TABLET, DELAYED RELEASE ORAL
Status: DISCONTINUED | OUTPATIENT
Start: 2025-06-30 | End: 2025-07-03 | Stop reason: HOSPADM

## 2025-06-29 RX ORDER — CARVEDILOL 12.5 MG/1
12.5 TABLET ORAL 2 TIMES DAILY WITH MEALS
Status: DISCONTINUED | OUTPATIENT
Start: 2025-06-29 | End: 2025-07-02

## 2025-06-29 RX ADMIN — ROSUVASTATIN CALCIUM 10 MG: 5 TABLET, FILM COATED ORAL at 20:37

## 2025-06-29 RX ADMIN — Medication 10 ML: at 20:37

## 2025-06-29 RX ADMIN — HEPARIN SODIUM 5000 UNITS: 5000 INJECTION INTRAVENOUS; SUBCUTANEOUS at 20:37

## 2025-06-29 RX ADMIN — BUDESONIDE 0.5 MG: 0.5 SUSPENSION RESPIRATORY (INHALATION) at 19:58

## 2025-06-29 RX ADMIN — LOPERAMIDE HYDROCHLORIDE 2 MG: 2 CAPSULE ORAL at 14:55

## 2025-06-29 RX ADMIN — HEPARIN SODIUM 5000 UNITS: 5000 INJECTION INTRAVENOUS; SUBCUTANEOUS at 05:14

## 2025-06-29 RX ADMIN — CLOPIDOGREL BISULFATE 75 MG: 75 TABLET ORAL at 08:21

## 2025-06-29 RX ADMIN — BUDESONIDE 0.5 MG: 0.5 SUSPENSION RESPIRATORY (INHALATION) at 07:35

## 2025-06-29 RX ADMIN — ASPIRIN 81 MG: 81 TABLET, COATED ORAL at 08:21

## 2025-06-29 RX ADMIN — ACETAMINOPHEN 650 MG: 325 TABLET ORAL at 20:37

## 2025-06-29 RX ADMIN — ACETAMINOPHEN 650 MG: 325 TABLET ORAL at 14:54

## 2025-06-29 RX ADMIN — LOPERAMIDE HYDROCHLORIDE 2 MG: 2 CAPSULE ORAL at 20:50

## 2025-06-29 RX ADMIN — Medication 10 ML: at 08:22

## 2025-06-29 RX ADMIN — MUPIROCIN 1 APPLICATION: 20 OINTMENT TOPICAL at 08:21

## 2025-06-29 RX ADMIN — HEPARIN SODIUM 5000 UNITS: 5000 INJECTION INTRAVENOUS; SUBCUTANEOUS at 14:55

## 2025-06-29 RX ADMIN — ACETAMINOPHEN 650 MG: 325 TABLET ORAL at 08:21

## 2025-06-29 RX ADMIN — PANTOPRAZOLE SODIUM 40 MG: 40 INJECTION, POWDER, FOR SOLUTION INTRAVENOUS at 08:21

## 2025-06-29 RX ADMIN — LEVOTHYROXINE SODIUM 50 MCG: 0.05 TABLET ORAL at 08:21

## 2025-06-29 RX ADMIN — MUPIROCIN 1 APPLICATION: 20 OINTMENT TOPICAL at 20:37

## 2025-06-29 NOTE — PLAN OF CARE
Goal Outcome Evaluation:              Outcome Evaluation: Patient alert and oriented.diastolic in 30s, MD aware. Complaints of abdomon pain and frequent loose bms. gave PRN imodium. No other complaints. Wound care done per orders.

## 2025-06-29 NOTE — PROGRESS NOTES
[facility]   Hospitalist Progress Note  Date: 2025  Patient Name: Charlette Rai  : 1935  MRN: [id number]  Date of admission: 2025      Subjective   Subjective     Chief Complaint: Follow up for hematemesis and generalized weakness     Summary: 89 y/o F with peripheral artery disease, renal artery stenosis status post stenting, hypertension, ESRD on HD, diastolic CHF, COPD, lung cancer on chemotherapy, type 2 diabetes mellitus, hypothyroidism who presented to ED with complaints of hematemesis and worsening weakness.  Hemoglobin 7.0 (previous hemoglobin 10 two week ago). CT abdomen pelvis obtained that showed large colonic stool burden. Patient admitted to ICU level of care. Pulmonology, gastroenterology and nephrology consulted.  Required 1 unit PRBCs. underwent EGD on 2025 that showed esophagitis and gastritis.  Hemoglobin stabilized.  Restarted aspirin/Plavix.    Interval Followup:   Blood pressure  low this morning  On 1 L nasal cannula, no respiratory complaints  No lightheadedness/dizziness, no chest pain  Tolerating liquids, no vomiting or abdominal pain, reports diarrhea resolved, wants to advance diet    Objective   Objective     Vitals:   Temp:  [97.3 °F (36.3 °C)-97.9 °F (36.6 °C)] 97.7 °F (36.5 °C)  Heart Rate:  [70-93] 73  Resp:  [15-18] 17  BP: (112-168)/(35-64) 139/37  Flow (L/min) (Oxygen Therapy):  [1] 1  Physical Exam    Constitutional: Elderly female, conversant, NAD   Respiratory:  nonlabored respirations    Cardiovascular:  RRR   Gastrointestinal: nondistended   Neurologic: Alert, speech clear   Skin: Extremities warm    Result Review    Result Review:  I have personally reviewed the following over the last 24 hours (07:00 to 07:00) and agree with the following findings  [x]  Laboratory  CBC          2025    04:26 2025    03:59 2025    04:12   CBC   WBC 13.50  6.61  12.03    RBC 2.85  2.53  2.76    Hemoglobin 9.0  7.8  8.7    Hematocrit 31.0  25.7   28.5    .8  101.6  103.3    MCH 31.6  30.8  31.5    MCHC 29.0  30.4  30.5    RDW 18.1  17.6  18.1    Platelets 179  161  167      CMP          6/27/2025    04:26 6/28/2025    03:59 6/29/2025    04:12   CMP   Glucose 46  91  79    BUN 24.9  35.3  18.3    Creatinine 2.56  3.37  2.39    EGFR 17.6  12.6  19.1    Sodium 133  134  133    Potassium 4.5  4.7  3.6    Chloride 98  100  98    Calcium 8.0  8.2  7.8    Total Protein 5.5      Albumin 2.5   2.6    Globulin 3.0      Total Bilirubin 0.3      Alkaline Phosphatase 276      AST (SGOT) 57      ALT (SGPT) 26      Albumin/Globulin Ratio 0.8      BUN/Creatinine Ratio 9.7  10.5  7.7    Anion Gap 14.9  8.4  10.5      [x]  Microbiology  C difficile PCR negative  [x]  Radiology   [x]  EKG/Telemetry monitor personally reviewed and independently interpreted: NSR  []  Cardiology/Vascular   []  Pathology  []  Old records  [x]  Other:    Intake/Output Summary (Last 24 hours) at 6/29/2025 0935  Last data filed at 6/29/2025 0815  Gross per 24 hour   Intake 440 ml   Output 2203 ml   Net -1763 ml         Assessment & Plan   Assessment / Plan     Assessment  GI bleed  Coffee-ground emesis  Acute anemia secondary to GI bleed  ESRD on hemodialysis  Urinary tract infection due to unknown infectious organism  Severe protein calorie malnutrition  Constipation/ resolved.   Hypertension  History of renal artery stenosis status post stenting  Peripheral artery disease  Chronic diastolic heart failure, not acutely exacerbated  Hypothyroidism  Lung cancer status post chemoradiation       Plan:    Status post EGD which showed esophagitis and gastritis.  No active bleeding source. No evidence of iron deficiency.  Did require 1 unit PRBC on admission.   Getting Epogen with dialysis  Hemoglobin up to 8.7.  Monitor daily, transfuse if less than 7  Continue Protonix, switch to p.o. 40 mg daily  Continue aspirin and Plavix  Blood pressure low this morning.  Hold amlodipine, reduce Coreg, continue  losartan with holding parameters  Continue scheduled nebulizers. Wean oxygen, SpO2 goal above 90%  Nephrology following, appreciate recommendation  Completed antibiotic treatment for UTI with ceftriaxone  Continue Synthroid  Continue Crestor  DVT Prophylaxis: Subcu heparin  PT/OT following.   Monitor electrolytes and renal function with BMP  Monitor WBC and Hgb with CBC in the AM     Discussed plan with RN.    Dispo: [place]    VTE Prophylaxis:  Pharmacologic & mechanical VTE prophylaxis orders are present.    I spent 51 of time for evaluation and management of this patient including review of chart, labs pertinent imaging available as well as medical decision making and discussion with physicians, staff and patient.       CODE STATUS:   Code Status (Patient has no pulse and is not breathing): CPR (Attempt to Resuscitate)  Medical Interventions (Patient has pulse or is breathing): Limited Support  Medical Intervention Limits: No intubation (DNI)    Electronically signed by Bairon Jim DO, 06/29/25, 9:41 AM EDT.

## 2025-06-29 NOTE — PLAN OF CARE
Goal Outcome Evaluation:   Pt slept well during the shift.  She did continued having frequent bowel movements so another dose of imodium was given.  There were no other changes noted during the shift.

## 2025-06-29 NOTE — PROGRESS NOTES
[facility]     Nephrology Progress Note      Patient Name: Charlette Rai  : 1935  MRN: [id number]  Primary Care Physician:  Brennan Mosqueda MD  Date of admission: 2025    Subjective   Subjective     Interval History:  Feeling slightly better.  Had diarrhea and abdominal discomfort/cramping yesterday and earlier today.  No nausea or vomiting.  Tolerating some p.o..  Appears to be less short of breath.    Hemodynamically stable.    Review of Systems   All systems were reviewed and negative except for: Was mentioned above.    Objective   Objective     Vitals:   Temp:  [97.3 °F (36.3 °C)-97.9 °F (36.6 °C)] 97.3 °F (36.3 °C)  Heart Rate:  [73-86] 75  Resp:  [15-18] 16  BP: (112-159)/(33-40) 159/33  Flow (L/min) (Oxygen Therapy):  [1] 1  Physical Exam:   Constitutional: Awake, alert, no distress, pale.  Weak and frail.     Eyes: sclerae anicteric, no conjunctival injection   HENT: mucous membranes moist   Neck: Supple, no thyromegaly, no lymphadenopathy, trachea midline, No JVD   Respiratory: clear, decreased to auscultation bilaterally.  No wheezing.   Cardiovascular: RRR, no murmurs, rubs, or gallops.   Gastrointestinal: Positive bowel sounds, soft, nontender, nondistended   Musculoskeletal: No edema, no clubbing or cyanosis, left leg/thigh pain.   Psychiatric: Appropriate affect, cooperative   Neurologic: Oriented x 3, moving all extremities, Cranial Nerves grossly intact, speech clear   Skin: warm and dry, no rashes   [facility] TDC in place.    Result Review    Result Reviewed:  I have personally reviewed the results from the time of this admission to 2025 16:04 EDT and agree with these findings:  [x]  Laboratory  []  Microbiology  [x]  Radiology  []  EKG/Telemetry   []  Cardiology/Vascular   []  Pathology  []  Old records  []  Other:        Lab 25  0412 25  0359 25  0426 25  0246 25  0118 25  1842   SODIUM 133* 134* 133* 134* 134* 133*   POTASSIUM 3.6 4.7  4.5 3.7 4.1 4.3   CHLORIDE 98 100 98 96* 93* 92*   CO2 24.5 25.6 20.1* 26.2 27.6 29.5*   BUN 18.3 35.3* 24.9* 44.3* 37.2* 33.0*   CREATININE 2.39* 3.37* 2.56* 3.51* 2.84* 2.65*   GLUCOSE 79 91 46* 80 101* 121*   EGFR 19.1* 12.6* 17.6* 12.0* 15.5* 16.9*   ANION GAP 10.5 8.4 14.9 11.8 13.4 11.5   MAGNESIUM  --  2.1 2.0 2.0  --   --    PHOSPHORUS 3.0 4.0 3.8 4.0  --   --            Most notable findings include: As above.    Assessment & Plan   Assessment / Plan       Active Hospital Problems:  Active Hospital Problems    Diagnosis     **GI bleed     Severe protein-calorie malnutrition    ESRD  Recent hip fracture  Severe anemia  Hypertension  History of renal artery stenosis, status post stenting 6/14/2024.    Assessment and Plan:    - ESRD, dialysis 3 times a week while inpatient through right IJ TDC.  Volume status and electrolytes are stable.  Resume MWF dialysis, little ultrafiltration with treatment tomorrow.   - Recent fall and hip fracture on the left side, status post repair, was in rehab prior to admission.  - Severe anemia, multifactorial including coffee-ground emesis.  Iron studies are adequate.  Continue Epogen with dialysis.  Was on long-acting CASSANDRA as outpatient.  Status post transfusion.  Status post EGD.  GI following.  - Hypertension: Blood pressure is acceptable.  Monitor.  - Secondary hyperparathyroidism and hyperphosphatemia, being addressed with dialysis.  Low phosphorus diet.  Sevelamer on hold.  - Constipation, recurrent with intermittent diarrhea in between.  per primary/GI.   - Severe peripheral arterial disease including lower extremities and renal artery stenosis, status post PTA and stent 6/14/2024. Given GI bleed, Plavix is on hold for now. ? Abdominal angina?.  - Lung cancer.  Status post radiation.  - Type 2 diabetes with complications, per primary.  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      Discussed with family.    Will follow, please call with any  questions!    Electronically signed by Edi García MD, 6/29/2025, 16:04 EDT.    [phone]

## 2025-06-30 ENCOUNTER — APPOINTMENT (OUTPATIENT)
Dept: NEPHROLOGY | Facility: HOSPITAL | Age: 89
End: 2025-06-30
Payer: MEDICARE

## 2025-06-30 LAB
ANION GAP SERPL CALCULATED.3IONS-SCNC: 11.1 MMOL/L (ref 5–15)
BUN SERPL-MCNC: 29.1 MG/DL (ref 8–23)
BUN/CREAT SERPL: 9.7 (ref 7–25)
CALCIUM SPEC-SCNC: 7.9 MG/DL (ref 8.6–10.5)
CHLORIDE SERPL-SCNC: 100 MMOL/L (ref 98–107)
CO2 SERPL-SCNC: 23.9 MMOL/L (ref 22–29)
CREAT SERPL-MCNC: 3.01 MG/DL (ref 0.57–1)
DEPRECATED RDW RBC AUTO: 66.7 FL (ref 37–54)
EGFRCR SERPLBLD CKD-EPI 2021: 14.5 ML/MIN/1.73
ERYTHROCYTE [DISTWIDTH] IN BLOOD BY AUTOMATED COUNT: 18.1 % (ref 12.3–15.4)
GLUCOSE SERPL-MCNC: 97 MG/DL (ref 65–99)
HCT VFR BLD AUTO: 28.2 % (ref 34–46.6)
HGB BLD-MCNC: 8.6 G/DL (ref 12–15.9)
MCH RBC QN AUTO: 31.5 PG (ref 26.6–33)
MCHC RBC AUTO-ENTMCNC: 30.5 G/DL (ref 31.5–35.7)
MCV RBC AUTO: 103.3 FL (ref 79–97)
PLATELET # BLD AUTO: 150 10*3/MM3 (ref 140–450)
PMV BLD AUTO: 9.5 FL (ref 6–12)
POTASSIUM SERPL-SCNC: 3.5 MMOL/L (ref 3.5–5.2)
QT INTERVAL: 408 MS
QTC INTERVAL: 489 MS
RBC # BLD AUTO: 2.73 10*6/MM3 (ref 3.77–5.28)
SODIUM SERPL-SCNC: 135 MMOL/L (ref 136–145)
WBC NRBC COR # BLD AUTO: 10.67 10*3/MM3 (ref 3.4–10.8)

## 2025-06-30 PROCEDURE — 94799 UNLISTED PULMONARY SVC/PX: CPT

## 2025-06-30 PROCEDURE — 94760 N-INVAS EAR/PLS OXIMETRY 1: CPT

## 2025-06-30 PROCEDURE — 85027 COMPLETE CBC AUTOMATED: CPT | Performed by: INTERNAL MEDICINE

## 2025-06-30 PROCEDURE — 25010000002 EPOETIN ALFA PER 1000 UNITS: Performed by: INTERNAL MEDICINE

## 2025-06-30 PROCEDURE — 25010000002 HEPARIN (PORCINE) PER 1000 UNITS: Performed by: INTERNAL MEDICINE

## 2025-06-30 PROCEDURE — 25010000002 ONDANSETRON PER 1 MG: Performed by: INTERNAL MEDICINE

## 2025-06-30 PROCEDURE — 80048 BASIC METABOLIC PNL TOTAL CA: CPT | Performed by: INTERNAL MEDICINE

## 2025-06-30 PROCEDURE — 99232 SBSQ HOSP IP/OBS MODERATE 35: CPT | Performed by: INTERNAL MEDICINE

## 2025-06-30 PROCEDURE — 94664 DEMO&/EVAL PT USE INHALER: CPT

## 2025-06-30 RX ADMIN — HEPARIN SODIUM 5000 UNITS: 5000 INJECTION INTRAVENOUS; SUBCUTANEOUS at 05:03

## 2025-06-30 RX ADMIN — CARVEDILOL 12.5 MG: 12.5 TABLET, FILM COATED ORAL at 17:54

## 2025-06-30 RX ADMIN — ACETAMINOPHEN 650 MG: 325 TABLET ORAL at 07:34

## 2025-06-30 RX ADMIN — ONDANSETRON 4 MG: 2 INJECTION INTRAMUSCULAR; INTRAVENOUS at 14:09

## 2025-06-30 RX ADMIN — BUDESONIDE 0.5 MG: 0.5 SUSPENSION RESPIRATORY (INHALATION) at 21:56

## 2025-06-30 RX ADMIN — LOPERAMIDE HYDROCHLORIDE 2 MG: 2 CAPSULE ORAL at 13:42

## 2025-06-30 RX ADMIN — ASPIRIN 81 MG: 81 TABLET, COATED ORAL at 08:00

## 2025-06-30 RX ADMIN — ERYTHROPOIETIN 10000 UNITS: 10000 INJECTION, SOLUTION INTRAVENOUS; SUBCUTANEOUS at 11:40

## 2025-06-30 RX ADMIN — ACETAMINOPHEN 650 MG: 325 TABLET ORAL at 13:41

## 2025-06-30 RX ADMIN — ACETAMINOPHEN 650 MG: 325 TABLET ORAL at 02:10

## 2025-06-30 RX ADMIN — ROSUVASTATIN CALCIUM 10 MG: 5 TABLET, FILM COATED ORAL at 20:34

## 2025-06-30 RX ADMIN — MUPIROCIN 1 APPLICATION: 20 OINTMENT TOPICAL at 08:00

## 2025-06-30 RX ADMIN — CARVEDILOL 12.5 MG: 12.5 TABLET, FILM COATED ORAL at 07:35

## 2025-06-30 RX ADMIN — HEPARIN SODIUM 5000 UNITS: 5000 INJECTION INTRAVENOUS; SUBCUTANEOUS at 22:24

## 2025-06-30 RX ADMIN — LOPERAMIDE HYDROCHLORIDE 2 MG: 2 CAPSULE ORAL at 20:34

## 2025-06-30 RX ADMIN — BUDESONIDE 0.5 MG: 0.5 SUSPENSION RESPIRATORY (INHALATION) at 07:23

## 2025-06-30 RX ADMIN — ACETAMINOPHEN 650 MG: 325 TABLET ORAL at 20:33

## 2025-06-30 RX ADMIN — LEVOTHYROXINE SODIUM 50 MCG: 0.05 TABLET ORAL at 07:35

## 2025-06-30 RX ADMIN — PANTOPRAZOLE SODIUM 40 MG: 40 TABLET, DELAYED RELEASE ORAL at 07:35

## 2025-06-30 RX ADMIN — HEPARIN SODIUM 3200 UNITS: 1000 INJECTION INTRAVENOUS; SUBCUTANEOUS at 11:37

## 2025-06-30 RX ADMIN — CLOPIDOGREL BISULFATE 75 MG: 75 TABLET ORAL at 08:00

## 2025-06-30 RX ADMIN — Medication 10 ML: at 08:00

## 2025-06-30 RX ADMIN — Medication 10 ML: at 20:34

## 2025-06-30 RX ADMIN — ISOSORBIDE MONONITRATE 30 MG: 30 TABLET, EXTENDED RELEASE ORAL at 20:34

## 2025-06-30 RX ADMIN — HEPARIN SODIUM 5000 UNITS: 5000 INJECTION INTRAVENOUS; SUBCUTANEOUS at 14:04

## 2025-06-30 NOTE — NURSING NOTE
Question Answer   Duration of Treatment 3.0 Hours   Access Site Tunneled Dialysis Catheter   Dialyzer Revaclear    mL/min   Dialysate Temperature (C) 36   BFR-As tolerated to a maximum of: 400 mL/min   Prime Dialyzer With NS to Priming Volume? Yes   Dialysate Solution Bath: K+ = 4 mEq, Ca = 2.5mEq   Bicarb 35 mEq   Na+ 137 meq   Fluid Removal: 0     Patient tolerated dialysis treatment well using right tunneled dialysis catheter, hep locked post treatment. Removed 0 fluid as ordered. BVP 48.9. IV epo given as ordered. Last blood pressure 163/51. Report given to Sandhya KHAN.

## 2025-06-30 NOTE — CONSULTS
Patient Name: Charlette Rai  YOB: 1935  MRN: [id number]  Admission date: 6/24/2025  Reason for Encounter: Malnutrition Assessment    [facility] Clinical Nutrition Assessment     Subjective    Subjective Information     6/30 Follow up  Patient is out of the room, undergoing dialysis. Pt seen in dialysis center. Pt reports 50% intake of lunch and dinner tray from the day prior. Denies any nausea, vomiting or constipation.       Follow up 6/27  Pt reports 30% intake of morning meal tray.  Endorses nausea and diarrhea.     Pt did not screen at risk but was identified with s/s of malnutrition during rounds. Noted pt to have muscle loss to upper body and significant acute wt loss. Admitted with coffee ground/black emesis, underwent EGD. EGD identified pt s/p Billroth I. Constipation present with dark liquid BM. ESRD on HD. Pt on clear liquid diet which provides very few kcals, additional restrictions not appropriate unless absolutely necessary. Will liberalize fat restriction. No phos labs ordered this admit, but nephro reports hyperphosphatemia managed with HD, recommend removing phos restriction while on clears but will defer to nephro.     Assessment    H&P and Current Problems      H&P  Past Medical History:   Diagnosis Date    Allergic rhinitis     Anaphylactic shock, unspecified, initial encounter 12/05/2023    Anemia     Arthritis     Asthma     USE INHALERS AND NEBULIZERS    Back pain     Bladder disorder     Cancer     LEFT LUNG CANCER 2005 SURGERY AND CHEMO DONE  AND CURRENTLY 4/ 14/22  RIGHT LUNG CANCER HAS ONLY RECEIVED RADIATION THUS FAR    Chronic kidney disease     stage 3    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 DENIES CP BUT DOES HAVE SOA CHRONIC ISSUE COPD/LUNG CANCER    COPD (chronic obstructive pulmonary disease)      FOLLOWED BY DR MARIAJOSE BAILEY    Coronary artery disease     DENIES CP BUT DOES GET SOA MOST OF THE TIME WITH EXERTION BUT OCC AT REST CHRONIC ISSUE COPD/LUNG CANCER    Deep vein thrombosis     Diabetes mellitus     DOES NOT CHECK BS DAILY    Disease of thyroid gland     HYPOTHYROIDISM    Essential hypertension     Gastric ulcer     GERD (gastroesophageal reflux disease)     Heart murmur     History of transfusion     NO ISSUES POST TRANSFUSION WAS MANY YEARS AGO    Hyperlipemia     Leukocytopenia     FOLLOWED BY DR MIR BAILEY    Limb swelling     Lumbago     Lumbar spinal stenosis     Lung cancer     Migraine headache     Multiple joint pain     On home oxygen therapy     4L/NC PRN    Osteopenia     PNA (pneumonia) 05/04/2024    Pseudomonas pneumonia 04/29/2024    Reflux esophagitis     Shortness of breath     Thyroid nodule     HAS ULTRASOUND YEARLY BEING MONITORED    Vascular disease     Vitamin D deficiency       Past Surgical History:   Procedure Laterality Date    ABDOMINAL SURGERY      ANGIOPLASTY RENAL ARTERY Left 06/14/2024    stent placement    APPENDECTOMY      ARTERIOGRAM N/A 6/14/2024    Procedure: Arteriogram, right radial access, aortogram and renal arteriogram with possible intervention.;  Surgeon: Dio Miguel MD;  Location: [facility] CATH INVASIVE LOCATION;  Service: Peripheral Vascular;  Laterality: N/A;    ARTERIOVENOUS FISTULA/SHUNT SURGERY Left 05/10/2022    Procedure: Left basilic vein transposition;  Surgeon: Wan Barksdale MD;  Location: [facility] OR;  Service: Vascular;  Laterality: Left;    ARTERIOVENOUS FISTULA/SHUNT SURGERY Left 03/23/2023    Procedure: Ligation of left arm arteriovenous fistula;  Surgeon: Wan Barksdale MD;  Location: [facility] MAIN OR;  Service: Vascular;  Laterality: Left;    ARTERIOVENOUS FISTULA/SHUNT SURGERY Left 11/30/2023    Procedure: Creation of left arm arteriovenous graft;  Surgeon: Wan Barksdale MD;  Location: [facility] MAIN OR;  Service: Vascular;  Laterality:  "Left;    BRONCHOSCOPY N/A 04/24/2024    Procedure: BRONCHOSCOPY WITH BAL AND WASHINGS;  Surgeon: Khalif Yanez MD;  Location: [facility] ENDOSCOPY;  Service: Pulmonary;  Laterality: N/A;  MUCUS PLUGGING    CARDIAC CATHETERIZATION  1996    CARDIAC SURGERY      CARDIAC SURGERY      fluid drained from heart    CATARACT EXTRACTION, BILATERAL  2003    COLONOSCOPY  2014    ENDOSCOPY  2016    2019    ENDOSCOPY N/A 6/25/2025    Procedure: ESOPHAGOGASTRODUODENOSCOPY;  Surgeon: Tanvi Del Valle MD;  Location: [facility] ENDOSCOPY;  Service: Gastroenterology;  Laterality: N/A;  gastritis, esophagitis    FEMORAL ARTERY STENT Bilateral     HYSTERECTOMY      LEG THROMBECTOMY/EMBOLECTOMY Left 1/29/2025    Procedure: LEFT LOWER EXTREMITY ANGIOGRAM;  Surgeon: Jaden Castañeda MD;  Location: [facility] HYBRID OR;  Service: Vascular;  Laterality: Left;    LUNG BIOPSY Left 2005    lobectomy upper lung caner    LUNG VOLUME REDUCTION      OTHER SURGICAL HISTORY      artifical joints/limbs    REPLACEMENT TOTAL KNEE Left 2016    SHUNT O GRAM N/A 1/13/2025    Procedure: dialysis shuntogram;  Surgeon: Court Valente MD;  Location: [facility] CATH INVASIVE LOCATION;  Service: Peripheral Vascular;  Laterality: N/A;    UPPER GASTROINTESTINAL ENDOSCOPY        Current Problems   Admission Diagnosis:  GI bleed [K92.2]  Gastrointestinal hemorrhage, unspecified gastrointestinal hemorrhage type [K92.2]    Problem List:    GI bleed    Severe protein-calorie malnutrition      Other Applicable Nutrition Information:   Billroth I     Anthropometrics      BMI, Height, Weight Estimated body mass index is 22.67 kg/m² as calculated from the following:    Height as of this encounter: 165.1 cm (65\").    Weight as of this encounter: 61.8 kg (136 lb 3.9 oz).    Weight Method: Bed scale       Trending Weight Changes weight loss of 5 lbs (3%) over 2 week(s)    Significant?  Yes       Weight History  Wt Readings from Last 20 Encounters:   06/30/25 0500 61.8 kg (136 lb 3.9 " oz)   06/29/25 0412 61.9 kg (136 lb 7.4 oz)   06/28/25 0435 56.4 kg (124 lb 5.4 oz)   06/27/25 0536 60.6 kg (133 lb 9.6 oz)   06/25/25 0356 64.8 kg (142 lb 13.7 oz)   06/24/25 1804 62.8 kg (138 lb 7.2 oz)   06/17/25 1130 66.7 kg (147 lb)   06/06/25 1755 66.7 kg (147 lb 0.8 oz)   06/03/25 1410 66.7 kg (147 lb)   05/13/25 1308 64.8 kg (142 lb 13.7 oz)   04/22/25 1309 65.2 kg (143 lb 12.8 oz)   04/22/25 1412 64.9 kg (143 lb)   04/01/25 0954 64 kg (141 lb 3.2 oz)   03/25/25 1111 61.6 kg (135 lb 12.8 oz)   03/18/25 1152 61.7 kg (136 lb)   03/03/25 1458 63 kg (138 lb 14.2 oz)   02/28/25 1515 62.1 kg (136 lb 12.7 oz)   02/26/25 1448 66 kg (145 lb 9.8 oz)   02/25/25 1236 63.5 kg (140 lb)   02/17/25 1448 64.8 kg (142 lb 13.7 oz)   02/06/25 1112 64.8 kg (142 lb 13.7 oz)   02/04/25 1013 64.4 kg (142 lb)   02/04/25 1040 64.4 kg (142 lb)   01/27/25 0727 64.5 kg (142 lb 3.2 oz)   01/26/25 2147 66.6 kg (146 lb 13.2 oz)   01/13/25 1350 65.2 kg (143 lb 11.8 oz)        Labs      Comment: Reviewed.      Results from last 7 days   Lab Units 06/30/25  0311 06/29/25  0412 06/28/25  0359 06/27/25  0426 06/26/25  0246 06/25/25  0118 06/25/25  0118 06/24/25  1842   SODIUM mmol/L 135* 133* 134* 133* 134*  --  134* 133*   POTASSIUM mmol/L 3.5 3.6 4.7 4.5 3.7  --  4.1 4.3   GLUCOSE mg/dL 97 79 91 46* 80  --  101* 121*   BUN mg/dL 29.1* 18.3 35.3* 24.9* 44.3*  --  37.2* 33.0*   CREATININE mg/dL 3.01* 2.39* 3.37* 2.56* 3.51*  --  2.84* 2.65*   CALCIUM mg/dL 7.9* 7.8* 8.2* 8.0* 8.1*  --  8.5* 8.2*   PHOSPHORUS mg/dL  --  3.0 4.0 3.8 4.0   < >  --   --    MAGNESIUM mg/dL  --   --  2.1 2.0 2.0  --   --   --    ALBUMIN g/dL  --  2.6*  --  2.5* 2.6*  --  3.0* 3.3*   LACTATE mmol/L  --   --   --   --   --   --   --  1.1   BILIRUBIN mg/dL  --   --   --  0.3 0.4  --  0.5 0.5   ALK PHOS U/L  --   --   --  276* 238*  --  296* 308*   AST (SGOT) U/L  --   --   --  57* 30  --  40* 45*   ALT (SGPT) U/L  --   --   --  26 13  --  19 21    < > = values in this  interval not displayed.     Results from last 7 days   Lab Units 06/30/25  0311 06/29/25  0412 06/28/25  0359 06/27/25  0426 06/26/25  0246   PLATELETS 10*3/mm3 150 167 161   < > 167   HEMOGLOBIN g/dL 8.6* 8.7* 7.8*   < > 7.9*   HEMATOCRIT % 28.2* 28.5* 25.7*   < > 26.0*   IRON mcg/dL  --   --   --   --  33*    < > = values in this interval not displayed.     Lab Results   Component Value Date    HGBA1C 4.9 04/16/2025          Medications       Scheduled Medications acetaminophen, 650 mg, Oral, Q6H  [Held by provider] amLODIPine, 5 mg, Oral, Daily  aspirin, 81 mg, Oral, Daily  budesonide, 0.5 mg, Nebulization, BID - RT  carvedilol, 12.5 mg, Oral, BID With Meals  clopidogrel, 75 mg, Oral, Daily  epoetin mita/mita-epbx, 10,000 Units, Intravenous, Once per day on Monday Wednesday Friday  heparin (porcine), 5,000 Units, Subcutaneous, Q8H  isosorbide mononitrate, 30 mg, Oral, Nightly  levothyroxine, 50 mcg, Oral, QAM AC  [Held by provider] losartan, 50 mg, Oral, BID  mupirocin, 1 Application, Each Nare, BID  pantoprazole, 40 mg, Oral, QAM AC  rosuvastatin, 10 mg, Oral, Nightly  [Held by provider] sevelamer, 800 mg, Oral, TID With Meals  sodium chloride, 10 mL, Intravenous, Q12H        Infusions      PRN Medications   dextrose    dextrose    glucagon (human recombinant)    heparin (porcine)    ipratropium-albuterol    loperamide    ondansetron    sodium chloride    sodium chloride     Physical Findings      Chewing/Swallowing  Teeth Status: Mouth/Teeth WDL: .WDL except, teeth Teeth Symptoms: tooth/teeth missing  Chewing/Swallowing Issues: No issues identified at this time   Edema  Generalized Edema: 1+ (Trace)         Leg, Left Edema: 1+ (Trace)               Bowel Function  Stool Output  Stool (mL): 3 mL (06/28/25 1700)  Stool Unmeasured Occurrence: 1 (06/30/25 1231)  Bowel Incontinence: Yes (06/30/25 1231)  Stool Amount: Small (06/30/25 1231)  Stool Consistency: soft (06/30/25 1231)  Perineal Care: absorbent brief/pad  changed, perineum cleansed, skin sealant/barrier applied (06/30/25 1231)  Last Bowel Movement: 06/30/25   I/Os  Intake & Output (last 3 days)         06/27 0701 06/28 0700 06/28 0701 06/29 0700 06/29 0701 06/30 0700 06/30 0701  07/01 0700    P.O. 555 240 800 100    Total Intake(mL/kg) 555 (9.8) 240 (3.9) 800 (12.9) 100 (1.6)    Urine (mL/kg/hr) 625 (0.5)       Stool 0 3  0    Dialysis  2200  0    Total Output 625 2203  0    Net -70 -1963 +800 +100            Urine Unmeasured Occurrence   4 x     Stool Unmeasured Occurrence 9 x 7 x 12 x 1 x           Lines, Drains, Airways, & Wounds       Active LDAs       Name Placement date Placement time Site Days Last dressing change    Peripheral IV 06/25/25 0200 20 G Anterior;Right Forearm 06/25/25  0200  Forearm  less than 1 06/25/25 0210 (12.20 hrs)    Hemodialysis Cath Double 01/13/25  1505  Subclavian  162     Wound 01/31/25 1032 Right posterior back 01/31/25  1032  -- 145     Wound Bilateral midline sacral spine Pressure Injury --  --  -- --     Wound Left posterior heel Pressure Injury --  --  -- --     Wound Right posterior heel Pressure Injury --  --  -- --     Wound Left lower leg Traumatic Abrasion --  --  -- --     Wound Left anterior knee Surgical Closed Surgical Incision --  --  -- --     Wound Left anterior greater trochanter Surgical Closed Surgical Incision --  --  -- --                       Nutrition Focused Physical Exam     Trending NFPE Completed 6/25     Malnutrition Severity Assessment      Patient meets criteria for : (S) Severe Malnutrition     (1)   Estimated Needs      Energy Requirements    Weight for Calculation 60.6   Method for Estimation  30 kcals/kg   Daily Needs (kcal/day) 1818 kcals/d   Protein Requirements    Weight for Calculation 60.6   Method for Estimation 1.2-1.5 gm/kg   Daily Needs (g/day) 73-91 g/d   Fluid Requirements     Method for Estimation 1 mL/kcal    Daily Needs (mL/day) 1.5-1.8 L/d     Current Nutrition Orders & Evaluation  of Intake      Oral Nutrition     Food Allergies  and Intolerances NKFA   Current PO Diet Diet: Diabetic, Renal; Consistent Carbohydrate; Low Potassium, Low Sodium (2-3g); Texture: Regular (IDDSI 7); Fluid Consistency: Thin (IDDSI 0)   Oral Nutrition Supplement None     Trending % PO Intake No intake documented   (2)  Assessment & Plan   Nutrition Diagnosis and Goals       Nutrition Diagnosis 1 Severe acute disease or injury related malnutrition related to GI dysfunction as evidenced by coffee ground emesis, wt loss, muscle loss      Nutrition Diagnosis 2 None        Goal(s) Establish PO Intake, Accepts Oral Nutrition Supplement, PO Diet Advances When Medically Appropriate , and No Significant Weight Loss     Nutrition Intervention and Prescription       Intervention  Liberalize diet, Start oral nutrition supplement, Continue to monitor for plan of care, and Completed NFPE      Diet Prescription Advance to High Protein, Low Phosphorous diet as tolerated  Liberalize current diet to Clear Liquid, Low Phos    - Recommend removing phos restriction     RD to modify boost Breeze to Boost Glucose Control, TID.     Supplement Prescription     Education Provided     (3)  Monitoring/Evaluation       Monitor/Evaluation PO Intake, Oral Nutrition Supplement Intake, Weight, and GI Status     RD Follow-Up Encounter 3-5 days     Electronically signed by:  Francis Hung RD  06/30/25 14:29 EDT

## 2025-06-30 NOTE — PLAN OF CARE
Goal Outcome Evaluation:   Pt slept well during the shift. There were still frequent bms which she was given imodium.  There were no other changes noted during the shift.

## 2025-06-30 NOTE — PROGRESS NOTES
[facility]   Hospitalist Progress Note  Date: 2025  Patient Name: Charlette Rai  : 1935  MRN: [id number]  Date of admission: 2025      Subjective   Subjective     Chief Complaint: Follow up for hematemesis and generalized weakness     Summary: 89 y/o F with peripheral artery disease, renal artery stenosis status post stenting, hypertension, ESRD on HD, diastolic CHF, COPD, lung cancer on chemotherapy, type 2 diabetes mellitus, hypothyroidism who presented to ED with complaints of hematemesis and worsening weakness.  Hemoglobin 7.0 (previous hemoglobin 10 two week ago). CT abdomen pelvis obtained that showed large colonic stool burden. Patient admitted to ICU level of care. Pulmonology, gastroenterology and nephrology consulted.  Required 1 unit PRBCs. underwent EGD on 2025 that showed esophagitis and gastritis.  Hemoglobin stabilized.  Restarted aspirin/Plavix.    Interval Followup:   Received dose of Imodium overnight    Afebrile, normotensive  Seen on dialysis this morning.  No fluid removal planned  On 2 L of oxygen breathing comfortably.  No respiratory complaint  Complaining of abdominal cramping.  No vomiting  No reports of melena or bright red blood per rectum    Objective   Objective     Vitals:   Temp:  [97.3 °F (36.3 °C)-97.7 °F (36.5 °C)] 97.5 °F (36.4 °C)  Heart Rate:  [66-83] 83  Resp:  [15-18] 18  BP: (142-163)/(31-43) 150/40  Flow (L/min) (Oxygen Therapy):  [1] 1  Physical Exam    Constitutional: Elderly female, chronically ill-appearing, resting in bed, on dialysis   respiratory:  nonlabored respirations    Cardiovascular:  RRR   Gastrointestinal: nondistended   Neurologic: Alert, speech clear   Skin: Extremities warm    Result Review    Result Review:  I have personally reviewed the following over the last 24 hours (07:00 to 07:00) and agree with the following findings  [x]  Laboratory  CBC          2025    03:59 2025    04:12 2025    03:11   CBC   WBC  6.61  12.03  10.67    RBC 2.53  2.76  2.73    Hemoglobin 7.8  8.7  8.6    Hematocrit 25.7  28.5  28.2    .6  103.3  103.3    MCH 30.8  31.5  31.5    MCHC 30.4  30.5  30.5    RDW 17.6  18.1  18.1    Platelets 161  167  150      CMP          6/28/2025    03:59 6/29/2025    04:12 6/30/2025    03:11   CMP   Glucose 91  79  97    BUN 35.3  18.3  29.1    Creatinine 3.37  2.39  3.01    EGFR 12.6  19.1  14.5    Sodium 134  133  135    Potassium 4.7  3.6  3.5    Chloride 100  98  100    Calcium 8.2  7.8  7.9    Albumin  2.6     BUN/Creatinine Ratio 10.5  7.7  9.7    Anion Gap 8.4  10.5  11.1      [x]  Microbiology  C difficile PCR negative  [x]  Radiology   [x]  EKG/Telemetry monitor personally reviewed and independently interpreted: NSR  []  Cardiology/Vascular   []  Pathology  []  Old records  [x]  Other:    Intake/Output Summary (Last 24 hours) at 6/30/2025 0748  Last data filed at 6/29/2025 1743  Gross per 24 hour   Intake 800 ml   Output --   Net 800 ml         Assessment & Plan   Assessment / Plan     Assessment  GI bleed  Coffee-ground emesis  Acute anemia secondary to GI bleed  ESRD on hemodialysis  Urinary tract infection due to unknown infectious organism  Severe protein calorie malnutrition  Constipation, resolved.  Now having diarrhea  Hypertension  Severe peripheral artery disease status post status post multiple intra-abdominal  and lower extremity stents  History of renal artery stenosis status post stenting  Chronic diastolic heart failure, not acutely exacerbated  Hypothyroidism  Lung cancer status post chemoradiation       Plan:    Status post EGD which showed esophagitis and gastritis.  No active bleeding source. No evidence of iron deficiency.  Did require 1 unit PRBC on admission.   Getting Epogen with dialysis  Hemoglobin 8.6, stable.  Monitor daily, transfuse if less than 7  Continue p.o Protonix 40 mg daily  Continue aspirin and Plavix given severe PAD  Blood pressure medication reduced given  low diastolics.  On Coreg 12.5 mg two times daily.  Continue scheduled nebulizers. Wean oxygen, SpO2 goal above 90%  Nephrology following, appreciate recommendation  Completed antibiotic treatment for UTI with ceftriaxone  Initially constipated with stool burden seen on CT scan.  Now having diarrhea and crampy abdominal pain. No melena/BRBPR.lactate nonelevated C. difficile PCR negative.  No RUQ pain.   Denies chronic issues with diarrhea.  Receiving Imodium; does not appear to be significant improvement. Gallbladder wall thickening on CT scan although there were multiple stones.   Continue Synthroid  Continue Crestor  DVT Prophylaxis: Subcu heparin  PT/OT following.   CBC, BMP in a.m.     Discussed plan with RN.    Dispo: Renny Escamilla, pre-CERT pending    VTE Prophylaxis:  Pharmacologic & mechanical VTE prophylaxis orders are present.    CODE STATUS:   Code Status (Patient has no pulse and is not breathing): CPR (Attempt to Resuscitate)  Medical Interventions (Patient has pulse or is breathing): Limited Support  Medical Intervention Limits: No intubation (DNI)  Electronically signed by Bairon Jim DO, 06/30/25, 2:38 PM EDT.

## 2025-06-30 NOTE — PROGRESS NOTES
[facility]     Nephrology Progress Note      Patient Name: Charlette Rai  : 1935  MRN: [id number]  Primary Care Physician:  Brennan Mosqueda MD  Date of admission: 2025    Subjective   Subjective     Interval History:  Feeling slightly better.  Seen while in dialysis.  Still with significant/frequent diarrhea.  No shortness of breath or chest pain.  Poor appetite.      Review of Systems   All systems were reviewed and negative except for: Was mentioned above.    Objective   Objective     Vitals:   Temp:  [97.3 °F (36.3 °C)-97.7 °F (36.5 °C)] 97.4 °F (36.3 °C)  Heart Rate:  [66-85] 82  Resp:  [16-18] 18  BP: (127-163)/(31-55) 163/51  Flow (L/min) (Oxygen Therapy):  [1-2] 2  Physical Exam:   Constitutional: Awake, alert, no distress, pale.  Weak and frail.     Eyes: sclerae anicteric, no conjunctival injection   HENT: mucous membranes moist   Neck: Supple, no thyromegaly, no lymphadenopathy, trachea midline, No JVD   Respiratory: clear, decreased to auscultation bilaterally.  No wheezing.   Cardiovascular: RRR, no murmurs, rubs, or gallops.   Gastrointestinal: Positive bowel sounds, soft, nontender, nondistended   Musculoskeletal: No edema, no clubbing or cyanosis, left leg/thigh pain.   Psychiatric: Appropriate affect, cooperative   Neurologic: Oriented x 3, moving all extremities, Cranial Nerves grossly intact, speech clear   Skin: warm and dry, no rashes   [facility] TDC in place..    Result Review    Result Reviewed:  I have personally reviewed the results from the time of this admission to 2025 14:41 EDT and agree with these findings:  [x]  Laboratory  []  Microbiology  [x]  Radiology  []  EKG/Telemetry   []  Cardiology/Vascular   []  Pathology  []  Old records  []  Other:        Lab 25  0311 25  0412 25  0359 25  0426 25  0246 25  0118 25  1842   SODIUM 135* 133* 134* 133* 134* 134* 133*   POTASSIUM 3.5 3.6 4.7 4.5 3.7 4.1 4.3   CHLORIDE 100 98  100 98 96* 93* 92*   CO2 23.9 24.5 25.6 20.1* 26.2 27.6 29.5*   BUN 29.1* 18.3 35.3* 24.9* 44.3* 37.2* 33.0*   CREATININE 3.01* 2.39* 3.37* 2.56* 3.51* 2.84* 2.65*   GLUCOSE 97 79 91 46* 80 101* 121*   EGFR 14.5* 19.1* 12.6* 17.6* 12.0* 15.5* 16.9*   ANION GAP 11.1 10.5 8.4 14.9 11.8 13.4 11.5   MAGNESIUM  --   --  2.1 2.0 2.0  --   --    PHOSPHORUS  --  3.0 4.0 3.8 4.0  --   --            Most notable findings include: As above.    Assessment & Plan   Assessment / Plan       Active Hospital Problems:  Active Hospital Problems    Diagnosis     **GI bleed     Severe protein-calorie malnutrition    ESRD  Recent hip fracture  Severe anemia  Hypertension  History of renal artery stenosis, status post stenting 6/14/2024.    Assessment and Plan:    - ESRD, dialysis 3 times a week while inpatient through right IJ TDC.  Volume status and electrolytes are stable.  Continue dialysis on MWF dialysis, little ultrafiltration with treatment today.  - Recent fall and hip fracture on the left side, status post repair, was in rehab prior to admission.  - Severe anemia, multifactorial including coffee-ground emesis.  Iron studies are adequate.  Continue Epogen with dialysis while here.  Was on long-acting CASSANDRA as outpatient.  Status post transfusion.  Status post EGD.  GI following.  - Hypertension: Blood pressure is acceptable.  Monitor.  - Secondary hyperparathyroidism and hyperphosphatemia, being addressed with dialysis.  Low phosphorus diet.  Sevelamer on hold.  - Constipation, recurrent with intermittent diarrhea,  per primary/GI.   - Severe peripheral arterial disease including lower extremities and renal artery stenosis, status post PTA and stent 6/14/2024. Given GI bleed, Plavix is on hold for now. ? Abdominal angina?.  - Lung cancer.  Status post radiation.  - Type 2 diabetes with complications, per primary.  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      Discussed with family yesterday, discussed with  dialysis staff.    Will follow, please call with any questions!    Electronically signed by Edi García MD, 6/30/2025, 14:41 EDT.    [phone]

## 2025-06-30 NOTE — PLAN OF CARE
Goal Outcome Evaluation:  Plan of Care Reviewed With: patient        Progress: improving  Outcome Evaluation: A&Ox4. Wound care completed per orders. Multiple loose bowel movements this shift. Dialysis completed today with no fluid removal per dialysis RN. [place] current plan of care.

## 2025-07-01 LAB
ANION GAP SERPL CALCULATED.3IONS-SCNC: 6.9 MMOL/L (ref 5–15)
BUN SERPL-MCNC: 19.4 MG/DL (ref 8–23)
BUN/CREAT SERPL: 8.2 (ref 7–25)
CALCIUM SPEC-SCNC: 7.9 MG/DL (ref 8.6–10.5)
CHLORIDE SERPL-SCNC: 102 MMOL/L (ref 98–107)
CO2 SERPL-SCNC: 26.1 MMOL/L (ref 22–29)
CREAT SERPL-MCNC: 2.38 MG/DL (ref 0.57–1)
DEPRECATED RDW RBC AUTO: 67.9 FL (ref 37–54)
EGFRCR SERPLBLD CKD-EPI 2021: 19.2 ML/MIN/1.73
ERYTHROCYTE [DISTWIDTH] IN BLOOD BY AUTOMATED COUNT: 18.1 % (ref 12.3–15.4)
GLUCOSE SERPL-MCNC: 103 MG/DL (ref 65–99)
HCT VFR BLD AUTO: 27.2 % (ref 34–46.6)
HGB BLD-MCNC: 8.2 G/DL (ref 12–15.9)
MCH RBC QN AUTO: 31.7 PG (ref 26.6–33)
MCHC RBC AUTO-ENTMCNC: 30.1 G/DL (ref 31.5–35.7)
MCV RBC AUTO: 105 FL (ref 79–97)
PLATELET # BLD AUTO: 133 10*3/MM3 (ref 140–450)
PMV BLD AUTO: 9.6 FL (ref 6–12)
POTASSIUM SERPL-SCNC: 4.1 MMOL/L (ref 3.5–5.2)
RBC # BLD AUTO: 2.59 10*6/MM3 (ref 3.77–5.28)
SODIUM SERPL-SCNC: 135 MMOL/L (ref 136–145)
WBC NRBC COR # BLD AUTO: 5.71 10*3/MM3 (ref 3.4–10.8)

## 2025-07-01 PROCEDURE — 94799 UNLISTED PULMONARY SVC/PX: CPT

## 2025-07-01 PROCEDURE — 25010000002 MORPHINE PER 10 MG: Performed by: INTERNAL MEDICINE

## 2025-07-01 PROCEDURE — 94664 DEMO&/EVAL PT USE INHALER: CPT

## 2025-07-01 PROCEDURE — 85027 COMPLETE CBC AUTOMATED: CPT | Performed by: INTERNAL MEDICINE

## 2025-07-01 PROCEDURE — 80048 BASIC METABOLIC PNL TOTAL CA: CPT | Performed by: INTERNAL MEDICINE

## 2025-07-01 PROCEDURE — 94760 N-INVAS EAR/PLS OXIMETRY 1: CPT

## 2025-07-01 PROCEDURE — 25010000002 HEPARIN (PORCINE) PER 1000 UNITS: Performed by: INTERNAL MEDICINE

## 2025-07-01 PROCEDURE — 25010000002 HYDROMORPHONE 1 MG/ML SOLUTION: Performed by: STUDENT IN AN ORGANIZED HEALTH CARE EDUCATION/TRAINING PROGRAM

## 2025-07-01 PROCEDURE — 99232 SBSQ HOSP IP/OBS MODERATE 35: CPT | Performed by: INTERNAL MEDICINE

## 2025-07-01 PROCEDURE — 97110 THERAPEUTIC EXERCISES: CPT

## 2025-07-01 RX ORDER — OXYCODONE HYDROCHLORIDE 5 MG/1
5 TABLET ORAL EVERY 8 HOURS PRN
Refills: 0 | Status: DISCONTINUED | OUTPATIENT
Start: 2025-07-01 | End: 2025-07-03 | Stop reason: HOSPADM

## 2025-07-01 RX ORDER — CALCIUM CARBONATE 500 MG/1
2 TABLET, CHEWABLE ORAL 3 TIMES DAILY PRN
Status: DISCONTINUED | OUTPATIENT
Start: 2025-07-01 | End: 2025-07-03 | Stop reason: HOSPADM

## 2025-07-01 RX ORDER — MORPHINE SULFATE 2 MG/ML
1 INJECTION, SOLUTION INTRAMUSCULAR; INTRAVENOUS EVERY 4 HOURS PRN
Status: DISCONTINUED | OUTPATIENT
Start: 2025-07-01 | End: 2025-07-03 | Stop reason: HOSPADM

## 2025-07-01 RX ORDER — OXYCODONE HYDROCHLORIDE 5 MG/1
2.5 TABLET ORAL EVERY 8 HOURS PRN
Refills: 0 | Status: DISCONTINUED | OUTPATIENT
Start: 2025-07-01 | End: 2025-07-03 | Stop reason: HOSPADM

## 2025-07-01 RX ORDER — ALUMINA, MAGNESIA, AND SIMETHICONE 2400; 2400; 240 MG/30ML; MG/30ML; MG/30ML
15 SUSPENSION ORAL EVERY 6 HOURS PRN
Status: DISCONTINUED | OUTPATIENT
Start: 2025-07-01 | End: 2025-07-03 | Stop reason: HOSPADM

## 2025-07-01 RX ADMIN — ACETAMINOPHEN 650 MG: 325 TABLET ORAL at 01:51

## 2025-07-01 RX ADMIN — HEPARIN SODIUM 5000 UNITS: 5000 INJECTION INTRAVENOUS; SUBCUTANEOUS at 14:09

## 2025-07-01 RX ADMIN — CLOPIDOGREL BISULFATE 75 MG: 75 TABLET ORAL at 08:09

## 2025-07-01 RX ADMIN — LEVOTHYROXINE SODIUM 50 MCG: 0.05 TABLET ORAL at 07:33

## 2025-07-01 RX ADMIN — MORPHINE SULFATE 1 MG: 2 INJECTION, SOLUTION INTRAMUSCULAR; INTRAVENOUS at 21:40

## 2025-07-01 RX ADMIN — HEPARIN SODIUM 5000 UNITS: 5000 INJECTION INTRAVENOUS; SUBCUTANEOUS at 05:48

## 2025-07-01 RX ADMIN — HEPARIN SODIUM 5000 UNITS: 5000 INJECTION INTRAVENOUS; SUBCUTANEOUS at 20:25

## 2025-07-01 RX ADMIN — Medication 10 ML: at 20:26

## 2025-07-01 RX ADMIN — HYDROMORPHONE HYDROCHLORIDE 0.25 MG: 1 INJECTION, SOLUTION INTRAMUSCULAR; INTRAVENOUS; SUBCUTANEOUS at 01:52

## 2025-07-01 RX ADMIN — BUDESONIDE 0.5 MG: 0.5 SUSPENSION RESPIRATORY (INHALATION) at 10:03

## 2025-07-01 RX ADMIN — OXYCODONE HYDROCHLORIDE 5 MG: 5 TABLET ORAL at 08:10

## 2025-07-01 RX ADMIN — ACETAMINOPHEN 650 MG: 325 TABLET ORAL at 08:09

## 2025-07-01 RX ADMIN — ACETAMINOPHEN 650 MG: 325 TABLET ORAL at 14:09

## 2025-07-01 RX ADMIN — LOPERAMIDE HYDROCHLORIDE 2 MG: 2 CAPSULE ORAL at 14:18

## 2025-07-01 RX ADMIN — BUDESONIDE 0.5 MG: 0.5 SUSPENSION RESPIRATORY (INHALATION) at 21:25

## 2025-07-01 RX ADMIN — Medication 10 ML: at 08:11

## 2025-07-01 RX ADMIN — ROSUVASTATIN CALCIUM 10 MG: 5 TABLET, FILM COATED ORAL at 20:24

## 2025-07-01 RX ADMIN — PANTOPRAZOLE SODIUM 40 MG: 40 TABLET, DELAYED RELEASE ORAL at 07:33

## 2025-07-01 RX ADMIN — CALCIUM CARBONATE 2 TABLET: 500 TABLET, CHEWABLE ORAL at 18:48

## 2025-07-01 RX ADMIN — ACETAMINOPHEN 650 MG: 325 TABLET ORAL at 20:24

## 2025-07-01 RX ADMIN — ISOSORBIDE MONONITRATE 30 MG: 30 TABLET, EXTENDED RELEASE ORAL at 20:25

## 2025-07-01 RX ADMIN — ASPIRIN 81 MG: 81 TABLET, COATED ORAL at 08:09

## 2025-07-01 NOTE — PROGRESS NOTES
[facility]     Nephrology Progress Note      Patient Name: Charlette Rai  : 1935  MRN: [id number]  Primary Care Physician:  rBennan Mosqueda MD  Date of admission: 2025    Subjective   Subjective     Interval History:  Feeling slightly better.    Still with significant/frequent diarrhea.  No shortness of breath or chest pain.  Poor appetite.      Objective   Objective     Vitals:   Temp:  [97.3 °F (36.3 °C)-97.7 °F (36.5 °C)] 97.7 °F (36.5 °C)  Heart Rate:  [67-85] 68  Resp:  [16-18] 16  BP: (123-163)/(37-55) 136/37  Flow (L/min) (Oxygen Therapy):  [1-2.5] 2.5  Physical Exam:   Constitutional: Awake, alert, no distress, pale.  Weak and frail.     Eyes: sclerae anicteric, no conjunctival injection   HENT: mucous membranes moist   Neck: Supple, no thyromegaly, no lymphadenopathy, trachea midline, No JVD   Respiratory: clear, decreased to auscultation bilaterally.  No wheezing.   Cardiovascular: RRR, no murmurs, rubs, or gallops.   Gastrointestinal: Positive bowel sounds, soft, nontender, nondistended   Musculoskeletal: No edema, no clubbing or cyanosis, left leg/thigh pain.   Psychiatric: Appropriate affect, cooperative   Neurologic: Oriented x 3, moving all extremities, Cranial Nerves grossly intact, speech clear   Skin: warm and dry, no rashes   [facility] TDC in place..    Result Review    Result Reviewed:  I have personally reviewed the results from the time of this admission to 2025 08:06 EDT and agree with these findings:  [x]  Laboratory  []  Microbiology  [x]  Radiology  []  EKG/Telemetry   []  Cardiology/Vascular   []  Pathology  []  Old records  []  Other:        Lab 25  0334 25  0311 25  0412 25  0359 25  0426 25  0246 25  0118 25  1842   SODIUM 135* 135* 133* 134* 133* 134* 134* 133*   POTASSIUM 4.1 3.5 3.6 4.7 4.5 3.7 4.1 4.3   CHLORIDE 102 100 98 100 98 96* 93* 92*   CO2 26.1 23.9 24.5 25.6 20.1* 26.2 27.6 29.5*   BUN 19.4 29.1* 18.3  35.3* 24.9* 44.3* 37.2* 33.0*   CREATININE 2.38* 3.01* 2.39* 3.37* 2.56* 3.51* 2.84* 2.65*   GLUCOSE 103* 97 79 91 46* 80 101* 121*   EGFR 19.2* 14.5* 19.1* 12.6* 17.6* 12.0* 15.5* 16.9*   ANION GAP 6.9 11.1 10.5 8.4 14.9 11.8 13.4 11.5   MAGNESIUM  --   --   --  2.1 2.0 2.0  --   --    PHOSPHORUS  --   --  3.0 4.0 3.8 4.0  --   --              Assessment & Plan   Assessment / Plan       Active Hospital Problems:  Active Hospital Problems    Diagnosis     **GI bleed     Severe protein-calorie malnutrition    ESRD  Recent hip fracture  Severe anemia  Hypertension  History of renal artery stenosis, status post stenting 6/14/2024.    Assessment and Plan:    - ESRD, dialysis 3 times a week while inpatient through right IJ TDC.  Volume status and electrolytes are stable.  Continue dialysis on MWF dialysis, little ultrafiltration with treatment today.    - Recent fall and hip fracture on the left side, status post repair, was in rehab prior to admission.    - Severe anemia, multifactorial including coffee-ground emesis.  Iron studies are adequate.  Continue Epogen with dialysis while here.  Was on long-acting CASSANDRA as outpatient.  Status post transfusion.  Status post EGD.  GI following.    - Hypertension: Blood pressure is acceptable.  Monitor.    - Secondary hyperparathyroidism and hyperphosphatemia, being addressed with dialysis.  Low phosphorus diet.  Sevelamer on hold.    - Constipation, recurrent with intermittent diarrhea,  per primary/GI.     - Severe peripheral arterial disease including lower extremities and renal artery stenosis, status post PTA and stent 6/14/2024. Given GI bleed, Plavix is on hold for now.    - Lung cancer.  Status post radiation.    - Type 2 diabetes with complications, per primary.    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

## 2025-07-01 NOTE — THERAPY TREATMENT NOTE
Acute Care - Physical Therapy Treatment Note  MAURICE Escamilla     Patient Name: Charlette Rai  : 1935  MRN: [id number]  Today's Date: 2025      Visit Dx:     ICD-10-CM ICD-9-CM   1. Gastrointestinal hemorrhage, unspecified gastrointestinal hemorrhage type  K92.2 578.9   2. Decreased activities of daily living (ADL)  Z78.9 V49.89   3. Difficulty walking  R26.2 719.7     Patient Active Problem List   Diagnosis    Malignant neoplasm of upper lobe of right lung    Rheumatoid arthritis    Asthma    Chronic heart failure with preserved ejection fraction    Essential hypertension    GERD (gastroesophageal reflux disease)    Hyperlipidemia LDL goal <70    Lumbar spinal stenosis    Migraine    Monoclonal paraproteinemia    Osteopenia    Oxygen dependent    Peripheral neuropathy    Stage 4 chronic kidney disease    Vitamin D deficiency    Carotid artery stenosis    Chronic right-sided thoracic back pain    Peripheral vascular disease of lower extremity    Ex-smoker    De Quervain's tenosynovitis    Arthritis of carpometacarpal (CMC) joint of right thumb    Steal syndrome of dialysis vascular access    Anemia of chronic renal failure, stage 4 (severe)    Aortic stenosis, moderate    Hematoma    End stage renal disease on dialysis    Coronary artery calcification seen on CT scan    Frailty syndrome in geriatric patient    COPD with lower respiratory infection    Coagulation defect, unspecified    Hyperphosphatemia    Hypothyroidism (acquired)    Idiopathic osteoarthritis    Secondary multiple arthritis    Type 2 diabetes mellitus with diabetic peripheral angiopathy without gangrene    Renal artery stenosis    S/P renal artery angioplasty    Atherosclerosis of renal artery    Abnormal serum immunoelectrophoresis    NSVT (nonsustained ventricular tachycardia)    Hypertensive heart and chronic kidney disease with heart failure and with stage 5 chronic kidney disease, or end stage renal disease    Unspecified  protein-calorie malnutrition    IFG (impaired fasting glucose)    Iron deficiency anemia    AV graft malfunction, initial encounter    AV graft malfunction    Weakness    Malignant neoplasm of lower lobe of left lung    Malignant neoplasm of lower lobe, right bronchus or lung    GI bleed    Severe protein-calorie malnutrition     Past Medical History:   Diagnosis Date    Allergic rhinitis     Anaphylactic shock, unspecified, initial encounter 12/05/2023    Anemia     Arthritis     Asthma     USE INHALERS AND NEBULIZERS    Back pain     Bladder disorder     Cancer     LEFT LUNG CANCER 2005 SURGERY AND CHEMO DONE  AND CURRENTLY 4/ 14/22  RIGHT LUNG CANCER HAS ONLY RECEIVED RADIATION THUS FAR    Chronic kidney disease     stage 3    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 DENIES CP BUT DOES HAVE SOA CHRONIC ISSUE COPD/LUNG CANCER    COPD (chronic obstructive pulmonary disease)     FOLLOWED BY DR MARIAJOSE BAILEY    Coronary artery disease     DENIES CP BUT DOES GET SOA MOST OF THE TIME WITH EXERTION BUT OCC AT REST CHRONIC ISSUE COPD/LUNG CANCER    Deep vein thrombosis     Diabetes mellitus     DOES NOT CHECK BS DAILY    Disease of thyroid gland     HYPOTHYROIDISM    Essential hypertension     Gastric ulcer     GERD (gastroesophageal reflux disease)     Heart murmur     History of transfusion     NO ISSUES POST TRANSFUSION WAS MANY YEARS AGO    Hyperlipemia     Leukocytopenia     FOLLOWED BY DR MIR BAILEY    Limb swelling     Lumbago     Lumbar spinal stenosis     Lung cancer     Migraine headache     Multiple joint pain     On home oxygen therapy     4L/NC PRN    Osteopenia     PNA (pneumonia) 05/04/2024    Pseudomonas pneumonia 04/29/2024    Reflux esophagitis     Shortness of breath     Thyroid nodule     HAS ULTRASOUND YEARLY BEING MONITORED    Vascular  disease     Vitamin D deficiency      Past Surgical History:   Procedure Laterality Date    ABDOMINAL SURGERY      ANGIOPLASTY RENAL ARTERY Left 06/14/2024    stent placement    APPENDECTOMY      ARTERIOGRAM N/A 6/14/2024    Procedure: Arteriogram, right radial access, aortogram and renal arteriogram with possible intervention.;  Surgeon: Dio Miguel MD;  Location: [facility] CATH INVASIVE LOCATION;  Service: Peripheral Vascular;  Laterality: N/A;    ARTERIOVENOUS FISTULA/SHUNT SURGERY Left 05/10/2022    Procedure: Left basilic vein transposition;  Surgeon: Wan Barksdale MD;  Location: [facility] MAIN OR;  Service: Vascular;  Laterality: Left;    ARTERIOVENOUS FISTULA/SHUNT SURGERY Left 03/23/2023    Procedure: Ligation of left arm arteriovenous fistula;  Surgeon: Wan Barksdale MD;  Location: [facility] MAIN OR;  Service: Vascular;  Laterality: Left;    ARTERIOVENOUS FISTULA/SHUNT SURGERY Left 11/30/2023    Procedure: Creation of left arm arteriovenous graft;  Surgeon: Wan Barksdale MD;  Location: [facility] MAIN OR;  Service: Vascular;  Laterality: Left;    BRONCHOSCOPY N/A 04/24/2024    Procedure: BRONCHOSCOPY WITH BAL AND WASHINGS;  Surgeon: Khalif Yanez MD;  Location: [facility] ENDOSCOPY;  Service: Pulmonary;  Laterality: N/A;  MUCUS PLUGGING    CARDIAC CATHETERIZATION  1996    CARDIAC SURGERY      CARDIAC SURGERY      fluid drained from heart    CATARACT EXTRACTION, BILATERAL  2003    COLONOSCOPY  2014    ENDOSCOPY  2016    2019    ENDOSCOPY N/A 6/25/2025    Procedure: ESOPHAGOGASTRODUODENOSCOPY;  Surgeon: Tanvi Del Valle MD;  Location: [facility] ENDOSCOPY;  Service: Gastroenterology;  Laterality: N/A;  gastritis, esophagitis    FEMORAL ARTERY STENT Bilateral     HYSTERECTOMY      LEG THROMBECTOMY/EMBOLECTOMY Left 1/29/2025    Procedure: LEFT LOWER EXTREMITY ANGIOGRAM;  Surgeon: Jaden Castañeda MD;  Location: [facility] HYBRID OR;  Service: Vascular;  Laterality: Left;    LUNG BIOPSY Left 2005    lobectomy upper lung  caner    LUNG VOLUME REDUCTION      OTHER SURGICAL HISTORY      artifical joints/limbs    REPLACEMENT TOTAL KNEE Left 2016    SHUNT O GRAM N/A 1/13/2025    Procedure: dialysis shuntogram;  Surgeon: Court Valente MD;  Location: [facility] CATH INVASIVE LOCATION;  Service: Peripheral Vascular;  Laterality: N/A;    UPPER GASTROINTESTINAL ENDOSCOPY       PT Assessment (Last 12 Hours)       PT Evaluation and Treatment       Row Name 07/01/25 1223          Physical Therapy Time and Intention    Subjective Information complains of;weakness;fatigue;pain  -DK     Document Type therapy note (daily note)  -DK     Mode of Treatment individual therapy;physical therapy  -DK     Patient Effort good  -DK     Symptoms Noted During/After Treatment fatigue;increased pain  -DK     Comment Pt reports having been up in the recliner since 6 AM, just returned to bed, and was being bathed on first attempt at treatment.  -       Row Name 07/01/25 1223          Pain    Pretreatment Pain Rating 6/10  -DK     Posttreatment Pain Rating 6/10  -DK     Pain Location back;hip;knee  -DK     Pain Side/Orientation bilateral;generalized  -DK     Pain Management Interventions exercise or physical activity utilized  -DK       Row Name 07/01/25 1223          Cognition    Affect/Mental Status (Cognition) WFL  -DK     Orientation Status (Cognition) oriented x 4  -DK     Follows Commands (Cognition) WFL  -DK     Cognitive Function (Cognition) WFL  -DK     Personal Safety Interventions gait belt;nonskid shoes/slippers when out of bed;supervised activity  -DK       Row Name 07/01/25 1223          Motor Skills    Motor Skills --  therapeutic exercises  -DK     Coordination WFL  -DK     Therapeutic Exercise hip;knee;ankle  -DK     Additional Documentation --  Transfers deferred due to having just returned to bed.  -       Row Name 07/01/25 1223          Hip (Therapeutic Exercise)    Hip (Therapeutic Exercise) AAROM (active assistive range of motion)  -DK      Hip AAROM (Therapeutic Exercise) bilateral;flexion;extension;aBduction;aDduction;supine;10 repetitions;2 sets  -DK       Row Name 07/01/25 1223          Knee (Therapeutic Exercise)    Knee (Therapeutic Exercise) AAROM (active assistive range of motion)  -     Knee AAROM (Therapeutic Exercise) bilateral;flexion;extension;supine;10 repetitions;2 sets  -DK       Row Name 07/01/25 1223          Ankle (Therapeutic Exercise)    Ankle (Therapeutic Exercise) AAROM (active assistive range of motion)  -     Ankle AAROM (Therapeutic Exercise) bilateral;dorsiflexion;plantarflexion;supine;10 repetitions;2 sets  -DK       Row Name             Wound Bilateral midline sacral spine Pressure Injury    Wound - Properties Group Present on Original Admission: Y  -JT Side: Bilateral  -JT Orientation: midline  -JT Location: sacral spine  -JT Primary Wound Type: Pressure Inj  -JT    Retired Wound - Properties Group Present on Original Admission: Y  -JT Side: Bilateral  -JT Orientation: midline  -JT Location: sacral spine  -JT    Retired Wound - Properties Group Present on Original Admission: Y  -JT Side: Bilateral  -JT Orientation: midline  -JT Location: sacral spine  -JT    Retired Wound - Properties Group Present on Original Admission: Y  -JT Side: Bilateral  -JT Location: sacral spine  -JT      Row Name             Wound Left posterior heel Pressure Injury    Wound - Properties Group Present on Original Admission: Y  -JT Side: Left  -JT Orientation: posterior  -JT Location: heel  -JT Primary Wound Type: Pressure Inj  -JT    Retired Wound - Properties Group Present on Original Admission: Y  -JT Side: Left  -JT Orientation: posterior  -JT Location: heel  -JT    Retired Wound - Properties Group Present on Original Admission: Y  -JT Side: Left  -JT Orientation: posterior  -JT Location: heel  -JT    Retired Wound - Properties Group Present on Original Admission: Y  -JT Side: Left  -JT Location: heel  -JT      Row Name             Wound  Right posterior heel Pressure Injury    Wound - Properties Group Present on Original Admission: Y  -JT Side: Right  -JT Orientation: posterior  -JT Location: heel  -JT Primary Wound Type: Pressure Inj  -JT    Retired Wound - Properties Group Present on Original Admission: Y  -JT Side: Right  -JT Orientation: posterior  -JT Location: heel  -JT    Retired Wound - Properties Group Present on Original Admission: Y  -JT Side: Right  -JT Orientation: posterior  -JT Location: heel  -JT    Retired Wound - Properties Group Present on Original Admission: Y  -JT Side: Right  -JT Location: heel  -JT      Row Name             Wound Left lower leg Traumatic Abrasion    Wound - Properties Group Present on Original Admission: Y  -JT Side: Left  -JT Orientation: lower  -JT Location: leg  -JT Primary Wound Type: Traumatic  -JT Secondary Wound Type - Traumatic: Abrasion  -JT    Retired Wound - Properties Group Present on Original Admission: Y  -JT Side: Left  -JT Orientation: lower  -JT Location: leg  -JT    Retired Wound - Properties Group Present on Original Admission: Y  -JT Side: Left  -JT Orientation: lower  -JT Location: leg  -JT    Retired Wound - Properties Group Present on Original Admission: Y  -JT Side: Left  -JT Location: leg  -JT      Row Name             Wound Left anterior knee Surgical Closed Surgical Incision    Wound - Properties Group Present on Original Admission: Y  -JT Side: Left  -JT Orientation: anterior  -JT Location: knee  -JT Primary Wound Type: Surgical  -JT Secondary Wound Type - Surgical: Closed Surgi  -JT    Retired Wound - Properties Group Present on Original Admission: Y  -JT Side: Left  -JT Orientation: anterior  -JT Location: knee  -JT    Retired Wound - Properties Group Present on Original Admission: Y  -JT Side: Left  -JT Orientation: anterior  -JT Location: knee  -JT    Retired Wound - Properties Group Present on Original Admission: Y  -JT Side: Left  -JT Location: knee  -JT      Row Name              Wound Left anterior greater trochanter Surgical Closed Surgical Incision    Wound - Properties Group Present on Original Admission: Y  -JT Side: Left  -JT Orientation: anterior  -JT Location: greater trochanter  -JT Primary Wound Type: Surgical  -JT Secondary Wound Type - Surgical: Closed Surgi  -JT    Retired Wound - Properties Group Present on Original Admission: Y  -JT Side: Left  -JT Orientation: anterior  -JT Location: greater trochanter  -JT    Retired Wound - Properties Group Present on Original Admission: Y  -JT Side: Left  -JT Orientation: anterior  -JT Location: greater trochanter  -JT    Retired Wound - Properties Group Present on Original Admission: Y  -JT Side: Left  -JT Location: greater trochanter  -JT      Row Name             Wound 01/31/25 1032 Right posterior back    Wound - Properties Group Placement Date: 01/31/25  -TE Placement Time: 1032  -TE Side: Right  -TE Orientation: posterior  -TE Location: back  -TE Primary Wound Type: Puncture  -TE Additional Comments: right lower lobe lung biopsy site per Dr Myron FAULKNER    Retireanuj Wound - Properties Group Placement Date: 01/31/25  -TE Placement Time: 1032  -TE Side: Right  -TE Orientation: posterior  -TE Location: back  -TE Primary Wound Type: Puncture  -TE Additional Comments: right lower lobe lung biopsy site per Dr Myron FAULKNER    Retireanuj Wound - Properties Group Placement Date: 01/31/25  -TE Placement Time: 1032  -TE Side: Right  -TE Orientation: posterior  -TE Location: back  -TE Primary Wound Type: Puncture  -TE Additional Comments: right lower lobe lung biopsy site per Dr Myron Smith Wound - Properties Group Date first assessed: 01/31/25  -TE Time first assessed: 1032  -TE Side: Right  -TE Location: back  -TE Primary Wound Type: Puncture  -TE Additional Comments: right lower lobe lung biopsy site per Dr Myron FAULKNER      Row Name 07/01/25 1223          Plan of Care Review    Plan of Care Reviewed With patient  -ESTRELLA      Progress no change  -DK       Row Name 07/01/25 1223          Positioning and Restraints    Pre-Treatment Position in bed  -DK     Post Treatment Position bed  -DK     In Bed supine;call light within reach;encouraged to call for assist;exit alarm on;side rails up x2;legs elevated;waffle boots/both;heels elevated  -DK               User Key  (r) = Recorded By, (t) = Taken By, (c) = Cosigned By      Initials Name Provider Type    TE Marie Kowalski, BILL Registered Nurse    Sonia Wright PTA Physical Therapist Assistant    Padmaja Soto RN Registered Nurse                    Physical Therapy Education       Title: PT OT SLP Therapies (In Progress)       Topic: Physical Therapy (In Progress)       Point: Mobility training (Done)       Learning Progress Summary            Patient Acceptance, E,TB, VU by  at 6/30/2025 1307    Acceptance, E,TB, VU by  at 6/27/2025 1501                      Point: Home exercise program (Not Started)       Learner Progress:  Not documented in this visit.              Point: Body mechanics (Done)       Learning Progress Summary            Patient Acceptance, E,TB, VU by JAMMIE at 6/28/2025 0551    Acceptance, E,TB, VU by LEANDER at 6/27/2025 1501                      Point: Precautions (Done)       Learning Progress Summary            Patient Acceptance, E,TB, VU by  at 6/30/2025 1307    Acceptance, E,TB, VU by JAMMIE at 6/28/2025 0551    Acceptance, E,TB, VU by AV at 6/27/2025 1501                                      User Key       Initials Effective Dates Name Provider Type Discipline     01/14/25 -  Susana Clement, RN Registered Nurse Nurse    LEANDER 06/11/21 -  Abdi Odom, RENAN Physical Therapist PT    JAMMIE 04/12/24 -  Miles Morgan, BILL Registered Nurse Nurse                  PT Recommendation and Plan     Plan of Care Reviewed With: patient  Progress: no change   Outcome Measures       Row Name 07/01/25 1223             How much help from another person do you currently need...     Turning from your back to your side while in flat bed without using bedrails? 3  -DK      Moving from lying on back to sitting on the side of a flat bed without bedrails? 3  -DK      Moving to and from a bed to a chair (including a wheelchair)? 3  -DK      Standing up from a chair using your arms (e.g., wheelchair, bedside chair)? 3  -DK      Climbing 3-5 steps with a railing? 2  -DK      To walk in hospital room? 3  -DK      AM-PAC 6 Clicks Score (PT) 17  -DK         Functional Assessment    Outcome Measure Options AM-PAC 6 Clicks Basic Mobility (PT)  -DK                User Key  (r) = Recorded By, (t) = Taken By, (c) = Cosigned By      Initials Name Provider Type    Sonia Wright PTA Physical Therapist Assistant                     Time Calculation:    PT Charges       Row Name 07/01/25 1226             Time Calculation    PT Received On 07/01/25  -DK      PT Goal Re-Cert Due Date 07/06/25  -DK         Timed Charges    57433 - PT Therapeutic Exercise Minutes 14  -DK      04732 - PT Therapeutic Activity Minutes 3  -DK         Total Minutes    Timed Charges Total Minutes 17  -DK       Total Minutes 17  -DK                User Key  (r) = Recorded By, (t) = Taken By, (c) = Cosigned By      Initials Name Provider Type    Sonia Wright PTA Physical Therapist Assistant                  Therapy Charges for Today       Code Description Service Date Service Provider Modifiers Qty    71659797914 HC PT THER PROC EA 15 MIN 7/1/2025 Sonia Joyce PTA GP 1            PT G-Codes  Outcome Measure Options: AM-PAC 6 Clicks Basic Mobility (PT)  AM-PAC 6 Clicks Score (PT): 17  AM-PAC 6 Clicks Score (OT): 15    Sonia Joyce PTA  7/1/2025

## 2025-07-01 NOTE — PLAN OF CARE
Goal Outcome Evaluation:  Plan of Care Reviewed With: patient        Progress: no change  Outcome Evaluation: Patient c/o pain unrelieved by MD Benedicto notified, new pain meds ordered and given. She had low bp, Coreg held am and pm. Her wound care done per orders. She continues on 2.5 L oxygen.

## 2025-07-01 NOTE — PLAN OF CARE
Goal Outcome Evaluation:           Progress: improving  Outcome Evaluation: Pt A&Ox4. One small episode of incontinence this shift- pt given immodium per MAR. C/o extreme pain in legs and back, x1 dose dilaudid ordered per MD. No other concerns/complaints. Continue plan of care.

## 2025-07-01 NOTE — PROGRESS NOTES
[facility]   Hospitalist Progress Note  Date: 2025  Patient Name: Charlette Rai  : 1935  MRN: [id number]  Date of admission: 2025      Subjective   Subjective     Chief Complaint: Follow up for hematemesis and generalized weakness     Summary: 89 y/o F with peripheral artery disease, renal artery stenosis status post stenting, hypertension, ESRD on HD, diastolic CHF, COPD, lung cancer on chemotherapy, type 2 diabetes mellitus, hypothyroidism who presented to ED with complaints of hematemesis and worsening weakness.  Hemoglobin 7.0 (previous hemoglobin 10 two week ago). CT abdomen pelvis obtained that showed large colonic stool burden. Patient admitted to ICU level of care. Pulmonology, gastroenterology and nephrology consulted.  Required 1 unit PRBCs. underwent EGD on 2025 that showed esophagitis and gastritis.  Hemoglobin stabilized.  Restarted aspirin.  Started on Brilinta by cardiology    Interval Followup:   Afebrile, normotensive  Awake alert and oriented   on 2 L of oxygen breathing comfortably.  No respiratory complaint.  Does have home oxygen  No vomiting or abdominal cramp   Complaining of left more than right lower extremity pain   no reports of melena or bright red blood per rectum  Diarrhea better only 2 soft stools today    Objective   Objective     Vitals:   Temp:  [97.3 °F (36.3 °C)-97.7 °F (36.5 °C)] 97.3 °F (36.3 °C)  Heart Rate:  [67-72] 68  Resp:  [16-18] 16  BP: (123-156)/(37-45) 137/42  Flow (L/min) (Oxygen Therapy):  [1.5-2.5] 2.5  Physical Exam    Constitutional: Elderly female, chronically ill-appearing, resting in bed,    respiratory:  nonlabored respirations    Cardiovascular:  RRR, positive right IJ tunnel dialysis catheter   Gastrointestinal: nondistended   Neurologic: Alert, speech clear   Skin: Extremities warm    Result Review    Result Review:  I have personally reviewed the following over the last 24 hours (07:00 to 07:00) and agree with the following  findings  [x]  Laboratory  CBC          6/29/2025    04:12 6/30/2025    03:11 7/1/2025    03:34   CBC   WBC 12.03  10.67  5.71    RBC 2.76  2.73  2.59    Hemoglobin 8.7  8.6  8.2    Hematocrit 28.5  28.2  27.2    .3  103.3  105.0    MCH 31.5  31.5  31.7    MCHC 30.5  30.5  30.1    RDW 18.1  18.1  18.1    Platelets 167  150  133      CMP          6/29/2025    04:12 6/30/2025    03:11 7/1/2025    03:34   CMP   Glucose 79  97  103    BUN 18.3  29.1  19.4    Creatinine 2.39  3.01  2.38    EGFR 19.1  14.5  19.2    Sodium 133  135  135    Potassium 3.6  3.5  4.1    Chloride 98  100  102    Calcium 7.8  7.9  7.9    Albumin 2.6      BUN/Creatinine Ratio 7.7  9.7  8.2    Anion Gap 10.5  11.1  6.9      [x]  Microbiology  C difficile PCR negative  [x]  Radiology   []  EKG/Telemetry   []  Cardiology/Vascular   []  Pathology  []  Old records  [x]  Other:    Intake/Output Summary (Last 24 hours) at 7/1/2025 1753  Last data filed at 7/1/2025 1200  Gross per 24 hour   Intake 686 ml   Output --   Net 686 ml         Assessment & Plan   Assessment / Plan     Assessment  GI bleed.  Status post 1 unit packed RBC  Coffee-ground emesis due to gastritis.  Status post EGD June 25  Acute anemia secondary to GI bleed  ESRD on hemodialysis  Urinary tract infection due to unknown infectious organism  Severe protein calorie malnutrition  Constipation, resolved.  Now having diarrhea  Hypertension  Severe peripheral artery disease status post status post multiple intra-abdominal  and lower extremity stents  History of renal artery stenosis status post stenting  Chronic diastolic heart failure, not acutely exacerbated  Hypothyroidism  Lung cancer status post chemoradiation  Chronic hypoxemia on home oxygen       Plan:    Status post EGD which showed esophagitis and gastritis.  No active bleeding source. No evidence of iron deficiency.  Did require 1 unit PRBC on admission.   Getting Epogen with dialysis  Hemoglobin  stable.  Monitor daily,  transfuse if less than 7  Continue p.o Protonix 40 mg daily  Continue aspirin.  Started on Brilinta by cardiology.  Off Plavix  Blood pressure medication reduced given low diastolics.  On Coreg 12.5 mg two times daily.  Continue scheduled nebulizers. Wean oxygen, SpO2 goal above 90%  Nephrology following, appreciate recommendation  Completed antibiotic treatment for UTI with ceftriaxone  Initially constipated with stool burden seen on CT scan.  Patient later on developed diarrhea this is improving with Imodium. No melena/BRBPR.lactate nonelevated C. difficile PCR negative.  No RUQ pain.   Denies chronic issues with diarrhea.    Continue Synthroid  Continue Crestor  Oxy IR and morphine to control leg pain.  DVT Prophylaxis: Subcu heparin  PT/OT following.  Recommend SNF.  CBC, BMP in a.m.     Discussed plan with RN and .    Dispo: [facility] Escamilla, pre-CERT pending for [place].    VTE Prophylaxis:  Pharmacologic & mechanical VTE prophylaxis orders are present.    CODE STATUS:   Code Status (Patient has no pulse and is not breathing): CPR (Attempt to Resuscitate)  Medical Interventions (Patient has pulse or is breathing): Limited Support  Medical Intervention Limits: No intubation (DNI)    Electronically signed by Patrick Turcios MD, 07/01/25, 5:59 PM EDT.

## 2025-07-02 ENCOUNTER — APPOINTMENT (OUTPATIENT)
Dept: NEPHROLOGY | Facility: HOSPITAL | Age: 89
End: 2025-07-02
Payer: MEDICARE

## 2025-07-02 LAB
ANION GAP SERPL CALCULATED.3IONS-SCNC: 11 MMOL/L (ref 5–15)
BUN SERPL-MCNC: 30.7 MG/DL (ref 8–23)
BUN/CREAT SERPL: 9 (ref 7–25)
CALCIUM SPEC-SCNC: 8.1 MG/DL (ref 8.6–10.5)
CHLORIDE SERPL-SCNC: 99 MMOL/L (ref 98–107)
CO2 SERPL-SCNC: 23 MMOL/L (ref 22–29)
CREAT SERPL-MCNC: 3.4 MG/DL (ref 0.57–1)
EGFRCR SERPLBLD CKD-EPI 2021: 12.5 ML/MIN/1.73
GLUCOSE SERPL-MCNC: 81 MG/DL (ref 65–99)
POTASSIUM SERPL-SCNC: 3.7 MMOL/L (ref 3.5–5.2)
SODIUM SERPL-SCNC: 133 MMOL/L (ref 136–145)
WHOLE BLOOD HOLD SPECIMEN: NORMAL

## 2025-07-02 PROCEDURE — 99232 SBSQ HOSP IP/OBS MODERATE 35: CPT | Performed by: INTERNAL MEDICINE

## 2025-07-02 PROCEDURE — 25010000002 HEPARIN (PORCINE) PER 1000 UNITS: Performed by: INTERNAL MEDICINE

## 2025-07-02 PROCEDURE — 94799 UNLISTED PULMONARY SVC/PX: CPT

## 2025-07-02 PROCEDURE — 80048 BASIC METABOLIC PNL TOTAL CA: CPT | Performed by: INTERNAL MEDICINE

## 2025-07-02 PROCEDURE — 25010000002 MORPHINE PER 10 MG: Performed by: INTERNAL MEDICINE

## 2025-07-02 PROCEDURE — 25010000002 ONDANSETRON PER 1 MG: Performed by: INTERNAL MEDICINE

## 2025-07-02 PROCEDURE — 25010000002 EPOETIN ALFA PER 1000 UNITS: Performed by: INTERNAL MEDICINE

## 2025-07-02 RX ORDER — CARVEDILOL 6.25 MG/1
6.25 TABLET ORAL 2 TIMES DAILY WITH MEALS
Status: DISCONTINUED | OUTPATIENT
Start: 2025-07-02 | End: 2025-07-03 | Stop reason: HOSPADM

## 2025-07-02 RX ORDER — GABAPENTIN 100 MG/1
100 CAPSULE ORAL NIGHTLY
Status: DISCONTINUED | OUTPATIENT
Start: 2025-07-02 | End: 2025-07-03 | Stop reason: HOSPADM

## 2025-07-02 RX ADMIN — ISOSORBIDE MONONITRATE 30 MG: 30 TABLET, EXTENDED RELEASE ORAL at 21:14

## 2025-07-02 RX ADMIN — Medication 10 ML: at 07:46

## 2025-07-02 RX ADMIN — ACETAMINOPHEN 650 MG: 325 TABLET ORAL at 21:14

## 2025-07-02 RX ADMIN — BUDESONIDE 0.5 MG: 0.5 SUSPENSION RESPIRATORY (INHALATION) at 21:28

## 2025-07-02 RX ADMIN — HEPARIN SODIUM 5000 UNITS: 5000 INJECTION INTRAVENOUS; SUBCUTANEOUS at 21:14

## 2025-07-02 RX ADMIN — ACETAMINOPHEN 650 MG: 325 TABLET ORAL at 14:13

## 2025-07-02 RX ADMIN — ONDANSETRON 4 MG: 2 INJECTION INTRAMUSCULAR; INTRAVENOUS at 11:30

## 2025-07-02 RX ADMIN — Medication 10 ML: at 21:14

## 2025-07-02 RX ADMIN — ROSUVASTATIN CALCIUM 10 MG: 5 TABLET, FILM COATED ORAL at 21:14

## 2025-07-02 RX ADMIN — ASPIRIN 81 MG: 81 TABLET, COATED ORAL at 07:40

## 2025-07-02 RX ADMIN — GABAPENTIN 100 MG: 100 CAPSULE ORAL at 21:13

## 2025-07-02 RX ADMIN — MORPHINE SULFATE 1 MG: 2 INJECTION, SOLUTION INTRAMUSCULAR; INTRAVENOUS at 10:57

## 2025-07-02 RX ADMIN — CARVEDILOL 12.5 MG: 12.5 TABLET, FILM COATED ORAL at 07:41

## 2025-07-02 RX ADMIN — ACETAMINOPHEN 650 MG: 325 TABLET ORAL at 07:38

## 2025-07-02 RX ADMIN — HEPARIN SODIUM 5000 UNITS: 5000 INJECTION INTRAVENOUS; SUBCUTANEOUS at 14:11

## 2025-07-02 RX ADMIN — LEVOTHYROXINE SODIUM 50 MCG: 0.05 TABLET ORAL at 07:40

## 2025-07-02 RX ADMIN — PANTOPRAZOLE SODIUM 40 MG: 40 TABLET, DELAYED RELEASE ORAL at 07:40

## 2025-07-02 RX ADMIN — CLOPIDOGREL BISULFATE 75 MG: 75 TABLET ORAL at 07:40

## 2025-07-02 RX ADMIN — OXYCODONE HYDROCHLORIDE 5 MG: 5 TABLET ORAL at 23:35

## 2025-07-02 RX ADMIN — ERYTHROPOIETIN 10000 UNITS: 10000 INJECTION, SOLUTION INTRAVENOUS; SUBCUTANEOUS at 07:37

## 2025-07-02 RX ADMIN — OXYCODONE HYDROCHLORIDE 5 MG: 5 TABLET ORAL at 14:12

## 2025-07-02 RX ADMIN — HEPARIN SODIUM 5000 UNITS: 5000 INJECTION INTRAVENOUS; SUBCUTANEOUS at 05:35

## 2025-07-02 RX ADMIN — CARVEDILOL 6.25 MG: 6.25 TABLET, FILM COATED ORAL at 17:22

## 2025-07-02 RX ADMIN — HEPARIN SODIUM 3200 UNITS: 1000 INJECTION INTRAVENOUS; SUBCUTANEOUS at 12:53

## 2025-07-02 NOTE — SIGNIFICANT NOTE
Wound Eval / Progress Noted    MAURICE Escamilla     Patient Name: Charlette Rai  : 1935  MRN: [id number]  Today's Date: 2025                 Admit Date: 2025    Visit Dx:    ICD-10-CM ICD-9-CM   1. Gastrointestinal hemorrhage, unspecified gastrointestinal hemorrhage type  K92.2 578.9   2. Decreased activities of daily living (ADL)  Z78.9 V49.89   3. Difficulty walking  R26.2 719.7         GI bleed    Severe protein-calorie malnutrition        Past Medical History:   Diagnosis Date    Allergic rhinitis     Anaphylactic shock, unspecified, initial encounter 2023    Anemia     Arthritis     Asthma     USE INHALERS AND NEBULIZERS    Back pain     Bladder disorder     Cancer     LEFT LUNG CANCER  SURGERY AND CHEMO DONE  AND CURRENTLY   RIGHT LUNG CANCER HAS ONLY RECEIVED RADIATION THUS FAR    Chronic kidney disease     stage 3    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 DENIES CP BUT DOES HAVE SOA CHRONIC ISSUE COPD/LUNG CANCER    COPD (chronic obstructive pulmonary disease)     FOLLOWED BY DR MARIAJOSE BAILEY    Coronary artery disease     DENIES CP BUT DOES GET SOA MOST OF THE TIME WITH EXERTION BUT OCC AT REST CHRONIC ISSUE COPD/LUNG CANCER    Deep vein thrombosis     Diabetes mellitus     DOES NOT CHECK BS DAILY    Disease of thyroid gland     HYPOTHYROIDISM    Essential hypertension     Gastric ulcer     GERD (gastroesophageal reflux disease)     Heart murmur     History of transfusion     NO ISSUES POST TRANSFUSION WAS MANY YEARS AGO    Hyperlipemia     Leukocytopenia     FOLLOWED BY DR MIR BAILEY    Limb swelling     Lumbago     Lumbar spinal stenosis     Lung cancer     Migraine headache     Multiple joint pain     On home oxygen therapy     4L/NC PRN    Osteopenia     PNA (pneumonia) 2024    Pseudomonas pneumonia  04/29/2024    Reflux esophagitis     Shortness of breath     Thyroid nodule     HAS ULTRASOUND YEARLY BEING MONITORED    Vascular disease     Vitamin D deficiency         Past Surgical History:   Procedure Laterality Date    ABDOMINAL SURGERY      ANGIOPLASTY RENAL ARTERY Left 06/14/2024    stent placement    APPENDECTOMY      ARTERIOGRAM N/A 6/14/2024    Procedure: Arteriogram, right radial access, aortogram and renal arteriogram with possible intervention.;  Surgeon: Dio Miguel MD;  Location: [facility] CATH INVASIVE LOCATION;  Service: Peripheral Vascular;  Laterality: N/A;    ARTERIOVENOUS FISTULA/SHUNT SURGERY Left 05/10/2022    Procedure: Left basilic vein transposition;  Surgeon: Wan Barksdale MD;  Location: [facility] MAIN OR;  Service: Vascular;  Laterality: Left;    ARTERIOVENOUS FISTULA/SHUNT SURGERY Left 03/23/2023    Procedure: Ligation of left arm arteriovenous fistula;  Surgeon: Wan Barksdale MD;  Location: [facility] MAIN OR;  Service: Vascular;  Laterality: Left;    ARTERIOVENOUS FISTULA/SHUNT SURGERY Left 11/30/2023    Procedure: Creation of left arm arteriovenous graft;  Surgeon: Wan Barksdale MD;  Location: [facility] MAIN OR;  Service: Vascular;  Laterality: Left;    BRONCHOSCOPY N/A 04/24/2024    Procedure: BRONCHOSCOPY WITH BAL AND WASHINGS;  Surgeon: Khalif Yanez MD;  Location: [facility] ENDOSCOPY;  Service: Pulmonary;  Laterality: N/A;  MUCUS PLUGGING    CARDIAC CATHETERIZATION  1996    CARDIAC SURGERY      CARDIAC SURGERY      fluid drained from heart    CATARACT EXTRACTION, BILATERAL  2003    COLONOSCOPY  2014    ENDOSCOPY  2016    2019    ENDOSCOPY N/A 6/25/2025    Procedure: ESOPHAGOGASTRODUODENOSCOPY;  Surgeon: Tanvi Del Valle MD;  Location: [facility] ENDOSCOPY;  Service: Gastroenterology;  Laterality: N/A;  gastritis, esophagitis    FEMORAL ARTERY STENT Bilateral     HYSTERECTOMY      LEG THROMBECTOMY/EMBOLECTOMY Left 1/29/2025    Procedure: LEFT LOWER EXTREMITY ANGIOGRAM;  Surgeon:  Jaden Castañeda MD;  Location: [facility] HYBRID OR;  Service: Vascular;  Laterality: Left;    LUNG BIOPSY Left 2005    lobectomy upper lung caner    LUNG VOLUME REDUCTION      OTHER SURGICAL HISTORY      artifical joints/limbs    REPLACEMENT TOTAL KNEE Left 2016    SHUNT O GRAM N/A 1/13/2025    Procedure: dialysis shuntogram;  Surgeon: Court Valente MD;  Location: [facility] CATH INVASIVE LOCATION;  Service: Peripheral Vascular;  Laterality: N/A;    UPPER GASTROINTESTINAL ENDOSCOPY           Physical Assessment:  Wound Right posterior heel Pressure Injury (Active)   Wound Image   07/02/25 0808   Pressure Injury Stage DTPI 07/02/25 0808   Dressing Appearance dry;intact 07/02/25 0808   Dressing Removed Type petroleum-based;non-adherent;gauze;silicone border foam 07/02/25 0808   Closure None 07/02/25 0808   Base dry;maroon/purple;nonblanchable 07/02/25 0808   Periwound dry;intact 07/02/25 0808   Periwound Temperature warm 07/02/25 0808   Periwound Skin Turgor soft 07/02/25 0808   Edges rolled/closed 07/02/25 0808   Drainage Amount none 07/02/25 0808   Care, Wound cleansed with;sterile normal saline 07/02/25 0808   Dressing Care dressing applied;petroleum-based;non-adherent;gauze;silicone border foam 07/02/25 0808   Periwound Care absorptive dressing applied 07/02/25 0808       Wound Left lower leg Traumatic Abrasion (Active)   Wound Image   07/02/25 0808   Dressing Appearance dry;intact 07/02/25 0808   Dressing Removed Type petroleum-based;non-adherent;gauze;silicone border foam 07/02/25 0808   Confirmed Empty Wound Bed Yes, visual inspection of wound bed 07/02/25 0808   Closure None 07/02/25 0808   Base moist;pink;red;maroon/purple 07/02/25 0808   Periwound dry;intact;ecchymotic 07/02/25 0808   Periwound Temperature warm 07/02/25 0808   Periwound Skin Turgor soft 07/02/25 0808   Edges open 07/02/25 0808   Drainage Characteristics/Odor serosanguineous 07/02/25 0808   Drainage Amount scant 07/02/25 0808   Care, Wound  cleansed with;sterile normal saline 07/02/25 0808   Dressing Care dressing applied;petroleum-based;non-adherent;gauze;silicone border foam 07/02/25 0808   Periwound Care absorptive dressing applied 07/02/25 0808       Wound Left anterior knee Surgical Closed Surgical Incision (Active)   Dressing Appearance dry;intact 07/02/25 0808   Dressing Removed Type petroleum-based;non-adherent;gauze;silicone border foam 07/02/25 0808   Closure Sutures 07/02/25 0808   Base clean;dry 07/02/25 0808   Periwound dry;intact 07/02/25 0808   Periwound Temperature warm 07/02/25 0808   Periwound Skin Turgor soft 07/02/25 0808   Edges rolled/closed 07/02/25 0808   Drainage Amount none 07/02/25 0808   Care, Wound cleansed with;sterile normal saline 07/02/25 0808   Dressing Care dressing applied;petroleum-based;non-adherent;gauze;silicone border foam 07/02/25 0808   Periwound Care absorptive dressing applied 07/02/25 0808       Wound Left anterior greater trochanter Surgical Closed Surgical Incision (Active)   Dressing Appearance dry;intact 07/02/25 0808   Dressing Removed Type petroleum-based;non-adherent;gauze;silicone border foam 07/02/25 0808   Closure Sutures 07/02/25 0808   Base clean;dry 07/02/25 0808   Periwound dry;intact 07/02/25 0808   Periwound Temperature warm 07/02/25 0808   Periwound Skin Turgor soft 07/02/25 0808   Edges rolled/closed 07/02/25 0808   Drainage Amount none 07/02/25 0808   Care, Wound cleansed with;sterile normal saline 07/02/25 0808   Dressing Care dressing applied;petroleum-based;non-adherent;gauze;silicone border foam 07/02/25 0808   Periwound Care absorptive dressing applied 07/02/25 0808     Wound Check / Follow-up: Patient seen today for wound follow-up.  Patient is awake, alert and oriented at time of visit.  She is agreeable to assessment.  Primary RN at bedside assisted with dressing changes and turning/repositioning.    Patient remains with surgical incisions to left upper thigh and left knee.   Sutures remain present.  No evidence of dehiscence noted.  No drainage present.  Cleansed areas with normal saline and gauze.  Recommending every other day dressing changes with non-adherent petroleum-based gauze and silicone border dressing.    Traumatic injury remains to left anterior lower leg.  Moist, pink to red and purple tissue noted to wound base.  Periwound tissue is dry and intact.  Cleansed wound with normal saline and gauze.  Deep tissue injury remains to right heel.  Intact maroon/purple not-blanchable tissue is present.  Cleansed wound with normal saline and gauze.  Applied non-adherent petroleum based gauze then secured with silicone border dressings.  Recommending every other day dressing changes.    Pressure injury to buttocks has resolved at this time.  Intact blanchable redness is noted.  Intact blanchable redness also present to left heel.  Recommending skin care and skin protection with application of blue top barrier cream.    Recommended to continue pressure reduction measures including every 2 hour turns and offloading heels at all times.      Impression: Surgical incisions to left upper thigh left knee.  Traumatic injury to left anterior lower leg.  Intact blanchable redness to bilateral gluteal aspects and left heel.        Short term goals: Regain skin integrity, skin protection, pressure reduction, every other day dressing changes, skin care.      Wendi Cuello RN    7/2/2025    13:02 EDT

## 2025-07-02 NOTE — PLAN OF CARE
Goal Outcome Evaluation:              Outcome Evaluation: pt is alert and oriented x4, vss, pt completed HD today, 0L were removed, pt encouraged to ambulate but pt stated she was too tired, pt uses a bedpan. pain meds given twice this shift. bath given. pt plans to go back to rehab tomorrow

## 2025-07-02 NOTE — NURSING NOTE
Duration of Treatment 3.5 Hours  Access Site Tunneled Dialysis Catheter  Dialyzer Revaclear   mL/min  Dialysate Temperature (C) 36  BFR-As tolerated to a maximum of: 400 mL/min  Prime Dialyzer With NS to Priming Volume? Yes  Dialysate Solution Bath: K+ = 3 mEq, CA = 2.5mg  Bicarb 30 meq  Na+ 137 meq  Fluid Removal: 1L    Patient ran 3.5hr treatment. No fluid removal with BVP of 77.8  Turned off UF fluid pull around 11:00 due to patient c/o cramping  Changed dressing on TDC  Administered prn dose of morphine for c/o pain in her legs, minimal relief by end of treatment  Also, administered prn dose of zofran for c/o nausea, resolved an hour later.   Reported off to Wendi KHAN

## 2025-07-02 NOTE — PLAN OF CARE
Goal Outcome Evaluation: A&Ox4, VSS, no overnight events.

## 2025-07-02 NOTE — PROGRESS NOTES
[facility]   Hospitalist Progress Note  Date: 2025  Patient Name: Charlette Rai  : 1935  MRN: [id number]  Date of admission: 2025      Subjective   Subjective     Chief Complaint: Follow up for hematemesis and generalized weakness     Summary: 89 y/o F with peripheral artery disease, renal artery stenosis status post stenting, hypertension, ESRD on HD, diastolic CHF, COPD, lung cancer on chemotherapy, type 2 diabetes mellitus, hypothyroidism who presented to ED with complaints of hematemesis and worsening weakness.  Hemoglobin 7.0 (previous hemoglobin 10 two week ago). CT abdomen pelvis obtained that showed large colonic stool burden. Patient admitted to ICU level of care. Pulmonology, gastroenterology and nephrology consulted.  Required 1 unit PRBCs. underwent EGD on 2025 that showed esophagitis and gastritis.  Hemoglobin stabilized.  Restarted aspirin.  Started on Brilinta by cardiology    Interval Followup:   Afebrile, BP soft.  Not dizzy  Awake alert and oriented   on 2 L of oxygen breathing comfortably.  No respiratory complaint.  Does have home oxygen  No vomiting or abdominal cramp   Complaining of left more than right lower extremity pain   Complaining of burning sensation bilateral feet for few weeks  no reports of melena or bright red blood per rectum  Diarrhea has improved having soft stools  Bladder scan to 52 mL    Objective   Objective     Vitals:   Temp:  [97.2 °F (36.2 °C)-97.7 °F (36.5 °C)] 97.5 °F (36.4 °C)  Heart Rate:  [64-77] 77  Resp:  [16] 16  BP: ()/(30-56) 152/41  Flow (L/min) (Oxygen Therapy):  [1-2.5] 1  Physical Exam    Constitutional: Elderly female, chronically ill-appearing, resting in bed,    respiratory:  nonlabored respirations    Cardiovascular: 2/6 systolic murmur, RRR, positive right IJ tunnel dialysis catheter   Gastrointestinal: nondistended   Neurologic: Alert, speech clear   Skin: Extremities warm.  Negative edema    Result Review    Result  Review:  I have personally reviewed the following over the last 24 hours (07:00 to 07:00) and agree with the following findings  [x]  Laboratory  CBC          6/29/2025    04:12 6/30/2025    03:11 7/1/2025    03:34   CBC   WBC 12.03  10.67  5.71    RBC 2.76  2.73  2.59    Hemoglobin 8.7  8.6  8.2    Hematocrit 28.5  28.2  27.2    .3  103.3  105.0    MCH 31.5  31.5  31.7    MCHC 30.5  30.5  30.1    RDW 18.1  18.1  18.1    Platelets 167  150  133      CMP          6/30/2025    03:11 7/1/2025    03:34 7/2/2025    04:45   CMP   Glucose 97  103  81    BUN 29.1  19.4  30.7    Creatinine 3.01  2.38  3.40    EGFR 14.5  19.2  12.5    Sodium 135  135  133    Potassium 3.5  4.1  3.7    Chloride 100  102  99    Calcium 7.9  7.9  8.1    BUN/Creatinine Ratio 9.7  8.2  9.0    Anion Gap 11.1  6.9  11.0      [x]  Microbiology  C difficile PCR negative  [x]  Radiology   []  EKG/Telemetry   []  Cardiology/Vascular   []  Pathology  []  Old records  [x]  Other:    Intake/Output Summary (Last 24 hours) at 7/2/2025 1846  Last data filed at 7/2/2025 1800  Gross per 24 hour   Intake 1174 ml   Output 0 ml   Net 1174 ml         Assessment & Plan   Assessment / Plan     Assessment  GI bleed.  Status post 1 unit packed RBC.  Stable  Coffee-ground emesis due to gastritis.  Status post EGD June 25  Acute anemia secondary to GI bleed  ESRD on hemodialysis Monday Wednesday Friday  Urinary tract infection due to unknown infectious organism.  Finished antibiotics  Severe protein calorie malnutrition  Constipation, resolved.    Hypertension.  Blood pressure soft  Severe peripheral artery disease status post status post multiple intra-abdominal  and lower extremity stents  History of renal artery stenosis status post stenting  Chronic diastolic heart failure, not acutely exacerbated  Hypothyroidism  Lung cancer status post chemoradiation  Chronic hypoxemia on home oxygen.  Stable       Plan:    Status post EGD which showed esophagitis and  gastritis.  No active bleeding source. No evidence of iron deficiency.  Did require 1 unit PRBC on admission.   Getting Epogen with dialysis  Hemoglobin  stable.  Monitor daily, transfuse if less than 7  Continue p.o Protonix 40 mg daily  Continue aspirin.  Started on Brilinta by cardiology.  Off Plavix  Coreg dose reduced further given low diastolic blood pressure.  DC Norvasc.  Losartan on hold  Continue scheduled nebulizers. Wean oxygen, SpO2 goal above 90%  Nephrology following, appreciate recommendation  Completed antibiotic treatment for UTI with ceftriaxone  Initially constipated with stool burden seen on CT scan.  Patient later on developed diarrhea this is improving with Imodium. No melena/BRBPR.lactate nonelevated C. difficile PCR negative.  No RUQ pain.   Denies chronic issues with diarrhea.    Continue Synthroid  Continue Crestor  Oxy IR and morphine to control leg pain.  Trial of Neurontin nightly  DVT Prophylaxis: Subcu heparin  PT/OT following.  Recommend SNF.  DC bladder scan.  DC telemetry     Discussed plan with RN and .    Dispo: Discharge to [facility] rehab in a.m.    VTE Prophylaxis:  Pharmacologic & mechanical VTE prophylaxis orders are present.    CODE STATUS:   Code Status (Patient has no pulse and is not breathing): CPR (Attempt to Resuscitate)  Medical Interventions (Patient has pulse or is breathing): Limited Support  Medical Intervention Limits: No intubation (DNI)    Electronically signed by Patrick Turcios MD, 07/02/25, 6:50 PM EDT.

## 2025-07-02 NOTE — PROGRESS NOTES
[facility]     Nephrology Progress Note      Patient Name: Charlette Rai  : 1935  MRN: [id number]  Primary Care Physician:  Brennan Mosqueda MD  Date of admission: 2025    Subjective   Subjective     Interval History:  Feeling drained after dialysis.  Diarrhea resolved.  No shortness of breath or chest pain.  Poor appetite.      Objective   Objective     Vitals:   Temp:  [97.2 °F (36.2 °C)-97.7 °F (36.5 °C)] 97.5 °F (36.4 °C)  Heart Rate:  [64-77] 77  Resp:  [16] 16  BP: ()/(30-56) 152/41  Flow (L/min) (Oxygen Therapy):  [1-2.5] 1  Physical Exam:   Constitutional: Awake, alert, no distress, pale.  Weak and frail.     Eyes: sclerae anicteric, no conjunctival injection   HENT: mucous membranes moist   Neck: Supple, no thyromegaly, no lymphadenopathy, trachea midline, + JVD   Respiratory: clear, decreased to auscultation bilaterally.  No wheezing.   Cardiovascular: RRR, no murmurs, rubs, or gallops.   Gastrointestinal: Positive bowel sounds, soft, nontender, nondistended   Musculoskeletal: No edema, no clubbing or cyanosis, left leg/thigh pain.   Psychiatric: Appropriate affect, cooperative   Neurologic: Oriented x 3, moving all extremities, Cranial Nerves grossly intact, speech clear   Skin: warm and dry, no rashes   RIJ TDC in place..    Result Review    Result Reviewed:  I have personally reviewed the results from the time of this admission to 2025 17:58 EDT and agree with these findings:  [x]  Laboratory  []  Microbiology  [x]  Radiology  []  EKG/Telemetry   []  Cardiology/Vascular   []  Pathology  []  Old records  []  Other:        Lab 25  0445 25  0334 25  0311 25  0412 25  0359 25  0426 25  0246   SODIUM 133* 135* 135* 133* 134* 133* 134*   POTASSIUM 3.7 4.1 3.5 3.6 4.7 4.5 3.7   CHLORIDE 99 102 100 98 100 98 96*   CO2 23.0 26.1 23.9 24.5 25.6 20.1* 26.2   BUN 30.7* 19.4 29.1* 18.3 35.3* 24.9* 44.3*   CREATININE 3.40* 2.38* 3.01*  2.39* 3.37* 2.56* 3.51*   GLUCOSE 81 103* 97 79 91 46* 80   EGFR 12.5* 19.2* 14.5* 19.1* 12.6* 17.6* 12.0*   ANION GAP 11.0 6.9 11.1 10.5 8.4 14.9 11.8   MAGNESIUM  --   --   --   --  2.1 2.0 2.0   PHOSPHORUS  --   --   --  3.0 4.0 3.8 4.0             Assessment & Plan   Assessment / Plan       Active Hospital Problems:  Active Hospital Problems    Diagnosis     **GI bleed     Severe protein-calorie malnutrition    ESRD  Recent hip fracture  Severe anemia  Hypertension  History of renal artery stenosis, status post stenting 6/14/2024.    Assessment and Plan:    - ESRD, dialysis 3 times a week while inpatient through right IJ TDC.  Volume status and electrolytes are stable.  Continue dialysis on MWF dialysis, gentle ultrafiltration as tolerated..    - Recent fall and hip fracture on the left side, status post repair, was in rehab prior to admission.    - Severe anemia, multifactorial including coffee-ground emesis.  Iron studies are adequate.  Continue Epogen with dialysis while here.  Was on long-acting CASSANDRA as outpatient.  Status post transfusion.  Status post EGD.  GI following.    - Hypertension: Blood pressure is acceptable.  Monitor.    - Secondary hyperparathyroidism and hyperphosphatemia, being addressed with dialysis.  Low phosphorus diet.  Sevelamer on hold.    - Constipation, recurrent with intermittent diarrhea,  per primary/GI.     - Severe peripheral arterial disease including lower extremities and renal artery stenosis, status post PTA and stent 6/14/2024. Given GI bleed, Plavix is on hold for now.    - Lung cancer.  Status post radiation.    - Type 2 diabetes with complications, per primary.    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      Discussed with her daughter Sanjuana and with her .  Will follow.    Plan to go to rehab at [facility].

## 2025-07-03 ENCOUNTER — TELEPHONE (OUTPATIENT)
Dept: GASTROENTEROLOGY | Facility: CLINIC | Age: 89
End: 2025-07-03
Payer: MEDICARE

## 2025-07-03 VITALS
WEIGHT: 134.7 LBS | OXYGEN SATURATION: 100 % | SYSTOLIC BLOOD PRESSURE: 135 MMHG | DIASTOLIC BLOOD PRESSURE: 67 MMHG | BODY MASS INDEX: 22.44 KG/M2 | HEART RATE: 70 BPM | TEMPERATURE: 97.3 F | RESPIRATION RATE: 16 BRPM | HEIGHT: 65 IN

## 2025-07-03 PROBLEM — K92.2 GI BLEED: Status: RESOLVED | Noted: 2025-06-24 | Resolved: 2025-07-03

## 2025-07-03 LAB
HCT VFR BLD AUTO: 25.6 % (ref 34–46.6)
HGB BLD-MCNC: 7.8 G/DL (ref 12–15.9)
HOLD SPECIMEN: NORMAL

## 2025-07-03 PROCEDURE — 99239 HOSP IP/OBS DSCHRG MGMT >30: CPT | Performed by: INTERNAL MEDICINE

## 2025-07-03 PROCEDURE — 94799 UNLISTED PULMONARY SVC/PX: CPT

## 2025-07-03 PROCEDURE — 25010000002 HEPARIN (PORCINE) PER 1000 UNITS: Performed by: INTERNAL MEDICINE

## 2025-07-03 PROCEDURE — 85018 HEMOGLOBIN: CPT | Performed by: INTERNAL MEDICINE

## 2025-07-03 PROCEDURE — 85014 HEMATOCRIT: CPT | Performed by: INTERNAL MEDICINE

## 2025-07-03 PROCEDURE — 94664 DEMO&/EVAL PT USE INHALER: CPT

## 2025-07-03 RX ORDER — GABAPENTIN 100 MG/1
100 CAPSULE ORAL 2 TIMES DAILY
Qty: 6 CAPSULE | Refills: 0 | Status: SHIPPED | OUTPATIENT
Start: 2025-07-03 | End: 2025-07-06

## 2025-07-03 RX ORDER — ROSUVASTATIN CALCIUM 20 MG/1
10 TABLET, COATED ORAL NIGHTLY
Qty: 90 TABLET | Refills: 3 | Status: SHIPPED | OUTPATIENT
Start: 2025-07-03

## 2025-07-03 RX ORDER — LOSARTAN POTASSIUM 25 MG/1
25 TABLET ORAL
Status: DISCONTINUED | OUTPATIENT
Start: 2025-07-03 | End: 2025-07-03 | Stop reason: HOSPADM

## 2025-07-03 RX ORDER — CARVEDILOL 6.25 MG/1
6.25 TABLET ORAL 2 TIMES DAILY WITH MEALS
Qty: 60 TABLET | Refills: 0 | Status: SHIPPED | OUTPATIENT
Start: 2025-07-03

## 2025-07-03 RX ORDER — LOSARTAN POTASSIUM 25 MG/1
25 TABLET ORAL
Status: DISCONTINUED | OUTPATIENT
Start: 2025-07-04 | End: 2025-07-03

## 2025-07-03 RX ORDER — LOSARTAN POTASSIUM 25 MG/1
25 TABLET ORAL
Qty: 30 TABLET | Refills: 0 | Status: SHIPPED | OUTPATIENT
Start: 2025-07-04

## 2025-07-03 RX ORDER — TICAGRELOR 90 MG/1
90 TABLET, FILM COATED ORAL 2 TIMES DAILY
Qty: 60 EACH | Refills: 0 | Status: CANCELLED | OUTPATIENT
Start: 2025-07-03

## 2025-07-03 RX ADMIN — BUDESONIDE 0.5 MG: 0.5 SUSPENSION RESPIRATORY (INHALATION) at 09:26

## 2025-07-03 RX ADMIN — LOSARTAN POTASSIUM 25 MG: 25 TABLET, FILM COATED ORAL at 12:11

## 2025-07-03 RX ADMIN — CALCIUM CARBONATE 2 TABLET: 500 TABLET, CHEWABLE ORAL at 18:09

## 2025-07-03 RX ADMIN — HEPARIN SODIUM 5000 UNITS: 5000 INJECTION INTRAVENOUS; SUBCUTANEOUS at 05:50

## 2025-07-03 RX ADMIN — ACETAMINOPHEN 650 MG: 325 TABLET ORAL at 08:26

## 2025-07-03 RX ADMIN — ACETAMINOPHEN 650 MG: 325 TABLET ORAL at 14:23

## 2025-07-03 RX ADMIN — PANTOPRAZOLE SODIUM 40 MG: 40 TABLET, DELAYED RELEASE ORAL at 08:25

## 2025-07-03 RX ADMIN — CALCIUM CARBONATE 2 TABLET: 500 TABLET, CHEWABLE ORAL at 10:07

## 2025-07-03 RX ADMIN — LEVOTHYROXINE SODIUM 50 MCG: 0.05 TABLET ORAL at 08:25

## 2025-07-03 RX ADMIN — Medication 10 ML: at 08:26

## 2025-07-03 RX ADMIN — ASPIRIN 81 MG: 81 TABLET, COATED ORAL at 08:25

## 2025-07-03 RX ADMIN — CARVEDILOL 6.25 MG: 6.25 TABLET, FILM COATED ORAL at 18:09

## 2025-07-03 RX ADMIN — ACETAMINOPHEN 650 MG: 325 TABLET ORAL at 03:34

## 2025-07-03 RX ADMIN — CLOPIDOGREL BISULFATE 75 MG: 75 TABLET ORAL at 08:25

## 2025-07-03 RX ADMIN — OXYCODONE HYDROCHLORIDE 5 MG: 5 TABLET ORAL at 08:25

## 2025-07-03 NOTE — SIGNIFICANT NOTE
07/03/25 1414   OTHER   Discipline occupational therapist   Rehab Time/Intention   Session Not Performed other (see comments)  (Pending d/c)

## 2025-07-03 NOTE — PLAN OF CARE
Goal Outcome Evaluation:           Progress: improving  Outcome Evaluation: pt discharging to signature of tiara cline for rehab, IV removed, education complete, follow up appts made

## 2025-07-03 NOTE — PLAN OF CARE
Goal Outcome Evaluation:  Plan of Care Reviewed With: patient        Progress: no change  Outcome Evaluation: patient is a&o. bladder scan showed 124mL this shift. no significant issues this shift. VSS.

## 2025-07-03 NOTE — TELEPHONE ENCOUNTER
Patient had EGD with Dr. Del Valle on 06/25/25  Findings included-  LA Grade A esophagitis  Gastritis  A Billiroth I anastomosis was found  Normal duodenum    Patient can follow-up with PCP

## 2025-07-03 NOTE — PROGRESS NOTES
[facility]     Nephrology Progress Note      Patient Name: Charlette Rai  : 1935  MRN: [id number]  Primary Care Physician:  Brennan Mosqueda MD  Date of admission: 2025    Subjective   Subjective     Interval History:  Diarrhea resolved.  No shortness of breath or chest pain.  Poor appetite.      Objective   Objective     Vitals:   Temp:  [97.3 °F (36.3 °C)-97.7 °F (36.5 °C)] 97.7 °F (36.5 °C)  Heart Rate:  [64-77] 70  Resp:  [16] 16  BP: ()/(30-56) 150/36  Flow (L/min) (Oxygen Therapy):  [2] 2  Physical Exam:   Constitutional: Awake, alert, no distress, pale.  Weak and frail.     Eyes: sclerae anicteric, no conjunctival injection   HENT: mucous membranes moist   Neck: Supple, no thyromegaly, no lymphadenopathy, trachea midline, no JVD   Respiratory: clear, decreased to auscultation bilaterally.  No wheezing.   Cardiovascular: RRR, no murmurs, rubs, or gallops.   Gastrointestinal: Positive bowel sounds, soft, nontender, nondistended   Musculoskeletal: No edema, no clubbing or cyanosis, left leg/thigh pain.   Psychiatric: Appropriate affect, cooperative   Neurologic: Oriented x 3, moving all extremities, Cranial Nerves grossly intact, speech clear   Skin: warm and dry, no rashes   [facility] TDC in place..    Result Review    Result Reviewed:  I have personally reviewed the results from the time of this admission to 7/3/2025 07:50 EDT and agree with these findings:  [x]  Laboratory  []  Microbiology  [x]  Radiology  []  EKG/Telemetry   []  Cardiology/Vascular   []  Pathology  []  Old records  []  Other:        Lab 25  0445 25  0334 25  0311 25  0412 25  0359 25  0426   SODIUM 133* 135* 135* 133* 134* 133*   POTASSIUM 3.7 4.1 3.5 3.6 4.7 4.5   CHLORIDE 99 102 100 98 100 98   CO2 23.0 26.1 23.9 24.5 25.6 20.1*   BUN 30.7* 19.4 29.1* 18.3 35.3* 24.9*   CREATININE 3.40* 2.38* 3.01* 2.39* 3.37* 2.56*   GLUCOSE 81 103* 97 79 91 46*   EGFR 12.5* 19.2* 14.5* 19.1*  12.6* 17.6*   ANION GAP 11.0 6.9 11.1 10.5 8.4 14.9   MAGNESIUM  --   --   --   --  2.1 2.0   PHOSPHORUS  --   --   --  3.0 4.0 3.8             Assessment & Plan   Assessment / Plan       Active Hospital Problems:  Active Hospital Problems    Diagnosis     **GI bleed     Severe protein-calorie malnutrition    ESRD  Recent hip fracture  Severe anemia  Hypertension  History of renal artery stenosis, status post stenting 6/14/2024.    Assessment and Plan:    - ESRD, dialysis 3 times a week while inpatient through right IJ TDC.  Volume status and electrolytes are stable.  Continue dialysis on MWF dialysis, gentle ultrafiltration as tolerated.    - Recent fall and hip fracture on the left side, status post repair, was in rehab prior to admission.    - Severe anemia, multifactorial including coffee-ground emesis.  Iron studies are adequate.  Continue Epogen with dialysis while here.  Was on long-acting CASSANDRA as outpatient.  Status post transfusion.  Status post EGD.  GI following.    - Hypertension: Blood pressure is acceptable.  Monitor.    - Secondary hyperparathyroidism and hyperphosphatemia, being addressed with dialysis.  Low phosphorus diet.  Sevelamer on hold.    - Constipation, recurrent with intermittent diarrhea,  per primary/GI.     - Severe peripheral arterial disease including lower extremities and renal artery stenosis, status post PTA and stent 6/14/2024. Given GI bleed, Plavix is on hold for now.    - Lung cancer.  Status post radiation.    - Type 2 diabetes with complications, per primary.    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

## 2025-07-03 NOTE — DISCHARGE SUMMARY
[facility]        HOSPITALIST  DISCHARGE SUMMARY    Patient Name: Charlette Rai  : 1935  MRN: [id number]    Date of Admission: 2025  Date of Discharge:  7/3/2025  Primary Care Physician: Brennan Mosqueda MD    Consults       Date and Time Order Name Status Description    2025  8:09 AM Inpatient Nephrology Consult Completed     2025  8:04 AM Inpatient Pulmonology Consult Completed     2025  8:37 PM Hospitalist (on-call MD unless specified)      2025  8:37 PM Gastroenterology (on-call MD unless specified) Completed     2025  8:37 PM Inpatient Hospitalist Consult              Active and Resolved Hospital Problems:  Active Hospital Problems    Diagnosis POA    Severe protein-calorie malnutrition [E43] Yes      Resolved Hospital Problems    Diagnosis POA    **GI bleed [K92.2] Yes     GI bleed.  Status post 1 unit packed RBC.  Stable  Coffee-ground emesis due to gastritis.  Status post EGD   Acute anemia secondary to GI bleed  ESRD on hemodialysis   Urinary tract infection due to unknown infectious organism.  Finished antibiotics  Severe protein calorie malnutrition  Constipation, resolved.    Hypertension.  Blood pressure soft  Severe peripheral artery disease status post status post multiple intra-abdominal  and lower extremity stents  History of renal artery stenosis status post stenting  Chronic diastolic heart failure, not acutely exacerbated  Hypothyroidism  Lung cancer status post chemoradiation  Chronic hypoxemia on home oxygen.  Stable  Hospital Course     Hospital Course:  Charlette Rai is a 89 y.o. female with peripheral artery disease, renal artery stenosis status post stenting, hypertension, ESRD on HD, diastolic CHF, COPD, lung cancer on chemotherapy, type 2 diabetes mellitus, hypothyroidism who presented to ED with complaints of hematemesis and worsening weakness.  Hemoglobin 7.0 (previous hemoglobin 10 two  week ago). CT abdomen pelvis obtained that showed large colonic stool burden. Patient admitted to ICU level of care. Pulmonology, gastroenterology and nephrology consulted.  Required 1 unit PRBCs. underwent EGD on 06/25/2025 that showed esophagitis and gastritis.  Hemoglobin stabilized.  Restarted aspirin.  Started on Brilinta by cardiology     Interval Followup:   Afebrile, BP soft.  Not dizzy  Awake alert and oriented   on 2 L of oxygen breathing comfortably.  No respiratory complaint.  Does have home oxygen  No vomiting or abdominal cramp   Complaining of left more than right lower extremity pain   Complaining of burning sensation bilateral feet for few weeks  no reports of melena or bright red blood per rectum  Diarrhea has improved having soft stools.  Stable for discharge to rehab    DISCHARGE Follow Up Recommendations for labs and diagnostics: PCP, nephrology, GI.  Have rehab facility check H&H in next few days.  Coreg and losartan dose decreased.      Day of Discharge     Vital Signs:  Temp:  [97.3 °F (36.3 °C)-97.7 °F (36.5 °C)] 97.3 °F (36.3 °C)  Heart Rate:  [69-77] 70  Resp:  [16] 16  BP: (124-152)/(40-67) 135/67  Flow (L/min) (Oxygen Therapy):  [2] 2    Physical Exam:   Constitutional: Elderly female, chronically ill-appearing, resting in bed,               respiratory:  nonlabored respirations               Cardiovascular: 2/6 systolic murmur, RRR, positive right IJ tunnel dialysis catheter              Gastrointestinal: nondistended              Neurologic: Alert, speech clear              Skin: Extremities warm.  Negative edema       Discharge Details        Discharge Medications        New Medications        Instructions Start Date   gabapentin 100 MG capsule  Commonly known as: Neurontin   100 mg, Oral, 2 Times Daily             Changes to Medications        Instructions Start Date   carvedilol 6.25 MG tablet  Commonly known as: COREG  What changed:   medication strength  how much to take   6.25  mg, Oral, 2 Times Daily With Meals      losartan 25 MG tablet  Commonly known as: COZAAR  What changed:   medication strength  how much to take  when to take this   25 mg, Oral, Every 24 Hours Scheduled   Start Date: July 4, 2025     rosuvastatin 20 MG tablet  Commonly known as: CRESTOR  What changed: how much to take   10 mg, Oral, Nightly             Continue These Medications        Instructions Start Date   acetaminophen 325 MG tablet  Commonly known as: TYLENOL   650 mg, Every 6 Hours PRN      albuterol sulfate  (90 Base) MCG/ACT inhaler  Commonly known as: PROVENTIL HFA;VENTOLIN HFA;PROAIR HFA   2 puffs, Inhalation, Every 4 Hours PRN      aspirin 81 MG EC tablet   81 mg, Daily      budesonide 0.5 MG/2ML nebulizer solution  Commonly known as: Pulmicort   0.5 mg, Nebulization, 2 Times Daily - RT      clopidogrel 75 MG tablet  Commonly known as: Plavix   75 mg, Oral, Daily      dexlansoprazole 60 MG capsule  Commonly known as: DEXILANT   60 mg, Oral, Daily      ferrous gluconate 324 MG tablet  Commonly known as: FERGON   324 mg, Oral, 3 Times Weekly      fluticasone 50 MCG/ACT nasal spray  Commonly known as: FLONASE   Administer 1 spray into the nostril(s) as directed by provider Daily.      furosemide 20 MG tablet  Commonly known as: LASIX   20 mg, Oral, Daily      ipratropium-albuterol 0.5-2.5 mg/3 ml nebulizer  Commonly known as: DUO-NEB   3 mL, Nebulization, Every 6 Hours PRN      isosorbide mononitrate 30 MG 24 hr tablet  Commonly known as: IMDUR   30 mg, Oral, Nightly      levocetirizine 5 MG tablet  Commonly known as: XYZAL   TAKE 1 TABLET DAILY      levothyroxine 50 MCG tablet  Commonly known as: SYNTHROID, LEVOTHROID   1 tablet, Daily      lidocaine-prilocaine 2.5-2.5 % cream  Commonly known as: EMLA   Apply 1 Application topically to the appropriate area as directed Take As Directed. Apply to dialysis access site 30-45 minutes prior to dialysis      MIRCERA IJ   200 mcg, Every 14 Days       tobramycin  MG/5ML nebulizer solution  Commonly known as: JUAN   300 mg, Nebulization, 2 Times Daily - RT, 30 days on 30 days off      vitamin D 1.25 MG (42221 UT) capsule capsule  Commonly known as: ERGOCALCIFEROL   50,000 Units, Oral, Every 14 Days             Stop These Medications      amLODIPine 5 MG tablet  Commonly known as: NORVASC     sevelamer 800 MG tablet  Commonly known as: RENVELA              No Known Allergies    Discharge Disposition:  Skilled Nursing Facility (DC - External) via EMS to [facility]    Diet:  Diet Instructions       Diet: Diabetic Diets, Renal Diets; Consistent Carbohydrate; Thin (IDDSI 0); Low Potassium, Low Phosphorus, Low Sodium (2-3g)      Discharge Diet:  Diabetic Diets  Renal Diets       Diabetic Diet: Consistent Carbohydrate    Fluid Consistency: Thin (IDDSI 0)    Renal Diet:  Low Potassium  Low Phosphorus  Low Sodium (2-3g)               Discharge Activity:   Activity Instructions       Activity as Tolerated              CODE STATUS:  Code Status and Medical Interventions: CPR (Attempt to Resuscitate); Limited Support; No intubation (DNI)   Ordered at: 06/24/25 9420     Code Status (Patient has no pulse and is not breathing):    CPR (Attempt to Resuscitate)     Medical Interventions (Patient has pulse or is breathing):    Limited Support     Medical Intervention Limits:    No intubation (DNI)         Future Appointments   Date Time Provider Department Center   7/4/2025  7:15 AM DIALYSIS ROOM [address] [facility] DIAL [facility]   7/15/2025  9:45 AM Brennan Medrano MD [facility] ONC ETWN [facility]   7/15/2025 12:30 PM Brennan Mosqueda MD [facility] PC MEMCT [facility]   7/22/2025 11:00 AM HEART FAIL CLIN [facility] HFC None   10/2/2025 11:30 AM León Sanchez MD [facility] CD ETOWN [facility]   10/21/2025 11:15 AM Khalif Yanez MD [facility] PCC ETW [facility]   11/13/2025  1:30 PM [address] CT [facility]   11/18/2025  1:30 PM Nevin Starks APRN [facility]       Additional Instructions for the  Follow-ups that You Need to Schedule       Discharge Follow-up with PCP   As directed       Currently Documented PCP:    Brennan Mosqueda MD    PCP Phone Number:    [phone]     Follow Up Details: 2 weeks        Discharge Follow-up with Specialty: Cardiology; 1 Month   As directed      Specialty: Cardiology   Follow Up: 1 Month        Discharge Follow-up with Specified Provider: Dr. Del Valle; 3 Weeks   As directed      To: Dr. Del Valle   Follow Up: 3 Weeks   Follow Up Details: GI bleed.        Discharge Follow-up with Specified Provider: Dr. Aviles; 1 Week   As directed      To: Dr. Aviles   Follow Up: 1 Week   Follow Up Details: Hemodialysis                Pertinent  and/or Most Recent Results     PROCEDURES:   Procedure:    ESOPHAGOGASTRODUODENOSCOPY  CPT(R) Code:  11900 - SD ESOPHAGOGASTRODUODENOSCOPY TRANSORAL DIAGNOSTIC       LAB RESULTS:      Lab 07/03/25 0445 07/01/25  0334 06/30/25 0311 06/29/25 0412 06/28/25  0359 06/27/25  0426   WBC  --  5.71 10.67 12.03* 6.61 13.50*   HEMOGLOBIN 7.8* 8.2* 8.6* 8.7* 7.8* 9.0*   HEMATOCRIT 25.6* 27.2* 28.2* 28.5* 25.7* 31.0*   PLATELETS  --  133* 150 167 161 179   MCV  --  105.0* 103.3* 103.3* 101.6* 108.8*         Lab 07/02/25 0445 07/01/25  0334 06/30/25 0311 06/29/25 0412 06/28/25  0359 06/27/25  0426   SODIUM 133* 135* 135* 133* 134* 133*   POTASSIUM 3.7 4.1 3.5 3.6 4.7 4.5   CHLORIDE 99 102 100 98 100 98   CO2 23.0 26.1 23.9 24.5 25.6 20.1*   ANION GAP 11.0 6.9 11.1 10.5 8.4 14.9   BUN 30.7* 19.4 29.1* 18.3 35.3* 24.9*   CREATININE 3.40* 2.38* 3.01* 2.39* 3.37* 2.56*   EGFR 12.5* 19.2* 14.5* 19.1* 12.6* 17.6*   GLUCOSE 81 103* 97 79 91 46*   CALCIUM 8.1* 7.9* 7.9* 7.8* 8.2* 8.0*   MAGNESIUM  --   --   --   --  2.1 2.0   PHOSPHORUS  --   --   --  3.0 4.0 3.8         Lab 06/29/25  0412 06/27/25  0426   TOTAL PROTEIN  --  5.5*   ALBUMIN 2.6* 2.5*   GLOBULIN  --  3.0   ALT (SGPT)  --  26   AST (SGOT)  --  57*   BILIRUBIN  --  0.3   ALK PHOS  --  276*                      Brief Urine Lab Results  (Last result in the past 365 days)        Color   Clarity   Blood   Leuk Est   Nitrite   Protein   CREAT   Urine HCG        06/25/25 0326 Yellow   Cloudy   Small (1+)   Small (1+)   Negative   100 mg/dL (2+)                 Microbiology Results (last 10 days)       Procedure Component Value - Date/Time    Clostridioides difficile Toxin - Stool, Per Rectum [[id number]] Collected: 06/27/25 0938    Lab Status: Final result Specimen: Stool from Per Rectum Updated: 06/27/25 1048    Narrative:      The following orders were created for panel order Clostridioides difficile Toxin - Stool, Per Rectum.  Procedure                               Abnormality         Status                     ---------                               -----------         ------                     Clostridioides difficile...[[id number]]                      Final result                 Please view results for these tests on the individual orders.    Clostridioides difficile Toxin, PCR - Stool, Per Rectum [[id number]] Collected: 06/27/25 0938    Lab Status: Final result Specimen: Stool from Per Rectum Updated: 06/27/25 1048     Toxigenic C. difficile by PCR Negative     027 Toxin Presumptive Negative    Narrative:      The result indicates the absence of toxigenic C. difficile from stool specimen.             XR Chest 1 View  Result Date: 6/24/2025  Impression: 1.Slight improvement in aeration of the left lung relative to the prior study. 2.Otherwise no significant interval change. Electronically Signed: Weston Castillo MD  6/24/2025 9:31 PM EDT  Workstation ID: [id number]    CT Abdomen Pelvis Without Contrast  Result Date: 6/24/2025  Impression: 1.Large colonic stool burden compatible with the history of constipation. There is some stranding adjacent to the rectosigmoid colon, which could reflect mild stercoral colitis. No bowel obstruction. 2.Distended urinary bladder. Correlate for bladder outlet obstruction.  3.Hydropic gallbladder containing multiple small stones. No definite gallbladder wall thickening or adjacent inflammatory change. 4.Interval development of anasarca. There is some asymmetric skin thickening and subcutaneous edema in the proximal left thigh. Correlate for cellulitis. 5.Trace right pleural effusion, improved from prior. 6.Additional findings as described above. Electronically Signed: Weston Castillo MD  6/24/2025 9:29 PM EDT  Workstation ID: [id number]    XR Abdomen Flat & Upright  Result Date: 6/24/2025  Impression: Impression: 1. Large volume colonic stool consistent with constipation. No evidence of bowel obstruction. 2. Postsurgical changes of the left hemithorax. There is a questionable left pneumothorax. Recommend dedicated chest radiograph. Electronically Signed: Ildefonso Purcell MD  6/24/2025 7:21 PM EDT  Workstation ID: [id number]    XR Chest 1 View  Result Date: 6/6/2025  Impression: Impression: 1.Mediastinal shift to the left, which could be due to volume loss from prior lobectomy. 2.Possible small left pleural effusion with left-sided airspace disease, which could reflect atelectasis or pneumonia. Electronically Signed: Leonardo Herbert MD  6/6/2025 7:15 PM EDT  Workstation ID: [id number]    CT Head Without Contrast  Result Date: 6/6/2025  Impression: Impression: Left parietal scalp contusion. No acute intracranial findings. Chronic and senescent changes as above. No acute fracture or traumatic malalignment in the cervical spine. Electronically Signed: Elliott Centeno MD  6/6/2025 7:01 PM EDT  Workstation ID: [id number]    CT Cervical Spine Without Contrast  Result Date: 6/6/2025  Impression: Impression: Left parietal scalp contusion. No acute intracranial findings. Chronic and senescent changes as above. No acute fracture or traumatic malalignment in the cervical spine. Electronically Signed: Elliott Centeno MD  6/6/2025 7:01 PM EDT  Workstation ID: [id number]    XR Hip With or Without Pelvis 2 - 3  View Left  Result Date: 6/6/2025  Impression: Impression: Bony demineralization without acute displaced fracture. Electronically Signed: Elliott Centeno MD  6/6/2025 6:53 PM EDT  Workstation ID: [id number]    XR Femur 2 View Left  Result Date: 6/6/2025  Impression: Impression: Acute periprosthetic fracture of the distal femoral metadiaphysis with suspected nondisplaced fracture lines extending to the femoral component of the total knee arthroplasty. Electronically Signed: Elliott Centeno MD  6/6/2025 6:42 PM EDT  Workstation ID: [id number]      Results for orders placed during the hospital encounter of 06/24/25    Duplex Venous Lower Extremity - Bilateral CV-READ 06/28/2025  7:47 AM    Interpretation Summary    Normal bilateral lower extremity venous duplex scan.    Technically limited tibial level imaging.      Results for orders placed during the hospital encounter of 06/24/25    Duplex Venous Lower Extremity - Bilateral CV-READ 06/28/2025  7:47 AM    Interpretation Summary    Normal bilateral lower extremity venous duplex scan.    Technically limited tibial level imaging.      Results for orders placed during the hospital encounter of 04/22/25    Adult Transthoracic Echo Complete W/ Cont if Necessary Per Protocol 04/23/2025  2:37 PM    Interpretation Summary    Left ventricular systolic function is normal. Calculated left ventricular EF = 60.5%    Left ventricular diastolic function is consistent with (grade Ia w/high LAP) impaired relaxation.    The left atrial cavity is mildly dilated.    Mild to moderate aortic valve stenosis is present.    Aortic valve mean pressure gradient is 16 mmHg.    Mild to moderate mitral valve stenosis is present.    Estimated right ventricular systolic pressure from tricuspid regurgitation is mildly elevated (35-45 mmHg).      Imaging Results (All)       Procedure Component Value Units Date/Time    XR Chest 1 View [[id number]] Collected: 06/24/25 2129     Updated: 06/24/25 2133     Narrative:      XR CHEST 1 VW    Date of Exam: 6/24/2025 9:20 PM EDT    Indication: Cough. History of lung cancer.    Comparison: 6/6/2025    Findings:  Dialysis catheter is at the cavoatrial junction. Chronic scarring noted right midlung. The right lung is otherwise clear. There is volume loss in the left hemithorax with shift of the midline structures to the left, unchanged. There is atherosclerotic   disease in the aorta, and there are calcified left-sided pleural plaques, also unchanged. There is slight improvement in aeration of the left lung relative to the prior study. No pneumothorax on either side of the chest. A vascular stent is present in   the left upper extremity.      Impression:      1.Slight improvement in aeration of the left lung relative to the prior study.  2.Otherwise no significant interval change.        Electronically Signed: Weston Castillo MD    6/24/2025 9:31 PM EDT    Workstation ID: [id number]    CT Abdomen Pelvis Without Contrast [[id number]] Collected: 06/24/25 2112     Updated: 06/24/25 2131    Narrative:      CT ABDOMEN PELVIS WO CONTRAST    Date of Exam: 6/24/2025 8:55 PM EDT    Indication: Abdominal and flank pain. Vomiting. Constipation.    Comparison: 1/27/2025    Technique: Axial CT images were obtained of the abdomen and pelvis without the administration of contrast. Reconstructed coronal and sagittal images were also obtained. Automated exposure control and iterative construction methods were used.    Findings:  Calcified pleural plaques in the left lower hemithorax with subjacent rounded atelectasis in the left lung base appears stable. Pericardial calcifications are again seen. There is a trace amount of right pleural fluid that is improved from prior. Mitral   valve annulus calcifications are present. Gallbladder is hydropic measuring about 5.2 cm short axis. It contains multiple small stones. No definite gallbladder wall thickening or adjacent inflammatory change. Common  bile duct measures about 13 mm which   is unchanged. No calcified stones are seen in the common bile duct. Multiple bilateral hypo and hyperdense renal lesions are again seen previously evaluated with multiphase CT 11/26/2024. No renal or ureteral stones are identified. There is no   hydronephrosis. The unenhanced solid abdominal organs otherwise appear grossly normal.    Urinary bladder is markedly distended. Correlate for bladder outlet obstruction. Uterus surgically absent. There has been interval development of anasarca, and there is a small amount of dependent free fluid in the presacral space. There is some   asymmetric skin thickening and subcutaneous edema in the proximal left thigh. There is a large colonic stool burden, increased from prior, compatible with the history of constipation. There is some stranding adjacent to the rectosigmoid colon could   reflect mild stercoral colitis. There is colonic diverticulosis without focal diverticulitis. The appendix is surgically absent. No small bowel obstruction is seen. Postoperative changes are noted near the GE junction, and there is a small hiatal hernia.   Stomach is otherwise normal. No bulky adenopathy is identified. Intramedullary sofía is present in the left femur.      Impression:      1.Large colonic stool burden compatible with the history of constipation. There is some stranding adjacent to the rectosigmoid colon, which could reflect mild stercoral colitis. No bowel obstruction.  2.Distended urinary bladder. Correlate for bladder outlet obstruction.  3.Hydropic gallbladder containing multiple small stones. No definite gallbladder wall thickening or adjacent inflammatory change.  4.Interval development of anasarca. There is some asymmetric skin thickening and subcutaneous edema in the proximal left thigh. Correlate for cellulitis.  5.Trace right pleural effusion, improved from prior.  6.Additional findings as described above.        Electronically  Signed: Weston Castillo MD    6/24/2025 9:29 PM EDT    Workstation ID: [id number]    XR Abdomen Flat & Upright [[id number]] Collected: 06/24/25 1914     Updated: 06/24/25 1923    Narrative:      XR ABDOMEN FLAT AND UPRIGHT    Date of Exam: 6/24/2025 6:43 PM EDT    Indication: ABD PAIN, FEVER OR DIARRHEA, CROHN DISEASE SUSPECTED, INITIAL PRESENTATION  Abdominal pain  evaluate for air fluid levels    Comparison: Chest radiograph 6/6/2025    Findings:  Extensive aortic atherosclerotic disease. Dual lumen right IJ central catheter tip near cavoatrial junction. Postsurgical changes in the left hemithorax. Small left effusion and/or chronic pleural thickening in addition to pleural calcifications. Linear   bandlike atelectasis/scar in the right upper lobe similar to prior. Possible pneumothorax measuring up to 2.4 cm along the lateral margin of the left lower lobe.    Large volume formed colonic stool. No significantly dilated loops of bowel to suggest ileus or obstruction. No gastric distention. Left renal artery stent. Kissing iliac stents. Severe right and probably moderate left degenerative osteoarthritis of the   hip joints. Osseous demineralization limiting detection for fractures. Multilevel thoracolumbar spondylosis with lumbar levocurvature.      Impression:      Impression:  1. Large volume colonic stool consistent with constipation. No evidence of bowel obstruction.  2. Postsurgical changes of the left hemithorax. There is a questionable left pneumothorax. Recommend dedicated chest radiograph.      Electronically Signed: Ildefonso Purcell MD    6/24/2025 7:21 PM EDT    Workstation ID: [id number]             Labs Pending at Discharge:          Time spent on Discharge including face to face service: 31 minutes  Part of this note may be an electronic transcription/translation of spoken language to printed text using the Dragon Dictation System.     TElectronically signed by Patrick Turcios MD, 07/03/25, 3:40 PM  EDT.

## 2025-07-09 ENCOUNTER — HOSPITAL ENCOUNTER (INPATIENT)
Facility: HOSPITAL | Age: 89
LOS: 9 days | Discharge: NURSING, REHAB OR LONG-TERM CARE | DRG: 689 | End: 2025-07-18
Attending: EMERGENCY MEDICINE | Admitting: HOSPITALIST
Payer: MEDICARE

## 2025-07-09 ENCOUNTER — APPOINTMENT (OUTPATIENT)
Dept: GENERAL RADIOLOGY | Facility: HOSPITAL | Age: 89
DRG: 689 | End: 2025-07-09
Payer: MEDICARE

## 2025-07-09 ENCOUNTER — APPOINTMENT (OUTPATIENT)
Dept: CT IMAGING | Facility: HOSPITAL | Age: 89
DRG: 689 | End: 2025-07-09
Payer: MEDICARE

## 2025-07-09 DIAGNOSIS — Z78.9 DECREASED ACTIVITIES OF DAILY LIVING (ADL): ICD-10-CM

## 2025-07-09 DIAGNOSIS — R41.82 ALTERED MENTAL STATUS, UNSPECIFIED ALTERED MENTAL STATUS TYPE: ICD-10-CM

## 2025-07-09 DIAGNOSIS — R13.12 OROPHARYNGEAL DYSPHAGIA: ICD-10-CM

## 2025-07-09 DIAGNOSIS — N39.0 ACUTE UTI: Primary | ICD-10-CM

## 2025-07-09 DIAGNOSIS — R26.2 DIFFICULTY WALKING: ICD-10-CM

## 2025-07-09 LAB
027 TOXIN: NORMAL
ALBUMIN SERPL-MCNC: 2.7 G/DL (ref 3.5–5.2)
ALBUMIN/GLOB SERPL: 1.1 G/DL
ALP SERPL-CCNC: 270 U/L (ref 39–117)
ALT SERPL W P-5'-P-CCNC: 32 U/L (ref 1–33)
ANION GAP SERPL CALCULATED.3IONS-SCNC: 11.2 MMOL/L (ref 5–15)
ANISOCYTOSIS BLD QL: NORMAL
AST SERPL-CCNC: 113 U/L (ref 1–32)
ATMOSPHERIC PRESS: 742.4 MMHG
BACTERIA UR QL AUTO: ABNORMAL /HPF
BASE EXCESS BLDV CALC-SCNC: 5.8 MMOL/L (ref -2–2)
BASOPHILS # BLD AUTO: 0.11 10*3/MM3 (ref 0–0.2)
BASOPHILS NFR BLD AUTO: 0.5 % (ref 0–1.5)
BILIRUB SERPL-MCNC: 0.4 MG/DL (ref 0–1.2)
BILIRUB UR QL STRIP: NEGATIVE
BUN SERPL-MCNC: 27 MG/DL (ref 8–23)
BUN/CREAT SERPL: 12.5 (ref 7–25)
C DIFF TOX GENS STL QL NAA+PROBE: NEGATIVE
CA-I BLDA-SCNC: 1.09 MMOL/L (ref 1.13–1.32)
CALCIUM SPEC-SCNC: 8.1 MG/DL (ref 8.6–10.5)
CHLORIDE BLDA-SCNC: 94 MMOL/L (ref 98–107)
CHLORIDE SERPL-SCNC: 95 MMOL/L (ref 98–107)
CLARITY UR: ABNORMAL
CO2 SERPL-SCNC: 26.8 MMOL/L (ref 22–29)
COLOR UR: YELLOW
CREAT SERPL-MCNC: 2.16 MG/DL (ref 0.57–1)
D-LACTATE SERPL-SCNC: 1.1 MMOL/L
D-LACTATE SERPL-SCNC: 1.4 MMOL/L (ref 0.5–2)
D-LACTATE SERPL-SCNC: 2.9 MMOL/L (ref 0.5–2)
DEPRECATED RDW RBC AUTO: 78.3 FL (ref 37–54)
EGFRCR SERPLBLD CKD-EPI 2021: 21.5 ML/MIN/1.73
EOSINOPHIL # BLD AUTO: 0.12 10*3/MM3 (ref 0–0.4)
EOSINOPHIL NFR BLD AUTO: 0.6 % (ref 0.3–6.2)
ERYTHROCYTE [DISTWIDTH] IN BLOOD BY AUTOMATED COUNT: 20.5 % (ref 12.3–15.4)
FERRITIN SERPL-MCNC: 1484 NG/ML (ref 13–150)
GAS FLOW AIRWAY: 2 LPM
GEN 5 1HR TROPONIN T REFLEX: 164 NG/L
GLOBULIN UR ELPH-MCNC: 2.4 GM/DL
GLUCOSE BLDC GLUCOMTR-MCNC: 98 MG/DL (ref 65–99)
GLUCOSE SERPL-MCNC: 108 MG/DL (ref 65–99)
GLUCOSE UR STRIP-MCNC: NEGATIVE MG/DL
HCO3 BLDV-SCNC: 32.1 MMOL/L (ref 22–26)
HCT VFR BLD AUTO: 26.1 % (ref 34–46.6)
HGB BLD-MCNC: 7.9 G/DL (ref 12–15.9)
HGB BLDA-MCNC: 8.2 G/DL (ref 12–18)
HGB UR QL STRIP.AUTO: ABNORMAL
HOLD SPECIMEN: NORMAL
HOLD SPECIMEN: NORMAL
HYALINE CASTS UR QL AUTO: ABNORMAL /LPF
IMM GRANULOCYTES # BLD AUTO: 0.31 10*3/MM3 (ref 0–0.05)
IMM GRANULOCYTES NFR BLD AUTO: 1.5 % (ref 0–0.5)
IRON 24H UR-MRATE: 17 MCG/DL (ref 37–145)
IRON SATN MFR SERPL: 18 % (ref 20–50)
KETONES UR QL STRIP: ABNORMAL
LEUKOCYTE ESTERASE UR QL STRIP.AUTO: ABNORMAL
LYMPHOCYTES # BLD AUTO: 0.33 10*3/MM3 (ref 0.7–3.1)
LYMPHOCYTES NFR BLD AUTO: 1.6 % (ref 19.6–45.3)
MACROCYTES BLD QL SMEAR: NORMAL
MAGNESIUM SERPL-MCNC: 1.9 MG/DL (ref 1.6–2.4)
MCH RBC QN AUTO: 32 PG (ref 26.6–33)
MCHC RBC AUTO-ENTMCNC: 30.3 G/DL (ref 31.5–35.7)
MCV RBC AUTO: 105.7 FL (ref 79–97)
MODALITY: ABNORMAL
MONOCYTES # BLD AUTO: 1.21 10*3/MM3 (ref 0.1–0.9)
MONOCYTES NFR BLD AUTO: 6 % (ref 5–12)
NEUTROPHILS NFR BLD AUTO: 18.14 10*3/MM3 (ref 1.7–7)
NEUTROPHILS NFR BLD AUTO: 89.8 % (ref 42.7–76)
NITRITE UR QL STRIP: NEGATIVE
NRBC BLD AUTO-RTO: 0 /100 WBC (ref 0–0.2)
PCO2 BLDV: 56.9 MM HG (ref 41–51)
PH BLDV: 7.36 PH UNITS (ref 7.31–7.41)
PH UR STRIP.AUTO: 6 [PH] (ref 5–8)
PLATELET # BLD AUTO: 144 10*3/MM3 (ref 140–450)
PMV BLD AUTO: 9.6 FL (ref 6–12)
PO2 BLDV: 27 MM HG (ref 35–42)
POTASSIUM BLDA-SCNC: 3.1 MMOL/L (ref 3.5–5)
POTASSIUM SERPL-SCNC: 3.4 MMOL/L (ref 3.5–5.2)
PROT SERPL-MCNC: 5.1 G/DL (ref 6–8.5)
PROT UR QL STRIP: ABNORMAL
QT INTERVAL: 429 MS
QTC INTERVAL: 499 MS
RBC # BLD AUTO: 2.47 10*6/MM3 (ref 3.77–5.28)
RBC # UR STRIP: ABNORMAL /HPF
REF LAB TEST METHOD: ABNORMAL
SAO2 % BLDCOV: 46 % (ref 45–75)
SMALL PLATELETS BLD QL SMEAR: ADEQUATE
SODIUM BLD-SCNC: 134 MMOL/L (ref 131–143)
SODIUM SERPL-SCNC: 133 MMOL/L (ref 136–145)
SP GR UR STRIP: 1.02 (ref 1–1.03)
SQUAMOUS #/AREA URNS HPF: ABNORMAL /HPF
TIBC SERPL-MCNC: 97 MCG/DL (ref 298–536)
TRANSFERRIN SERPL-MCNC: 65 MG/DL (ref 200–360)
TROPONIN T % DELTA: 2
TROPONIN T NUMERIC DELTA: 3 NG/L
TROPONIN T SERPL HS-MCNC: 161 NG/L
UROBILINOGEN UR QL STRIP: ABNORMAL
WBC # UR STRIP: ABNORMAL /HPF
WBC MORPH BLD: NORMAL
WBC NRBC COR # BLD AUTO: 20.22 10*3/MM3 (ref 3.4–10.8)
WHOLE BLOOD HOLD COAG: NORMAL
WHOLE BLOOD HOLD SPECIMEN: NORMAL

## 2025-07-09 PROCEDURE — 81001 URINALYSIS AUTO W/SCOPE: CPT | Performed by: EMERGENCY MEDICINE

## 2025-07-09 PROCEDURE — 70450 CT HEAD/BRAIN W/O DYE: CPT

## 2025-07-09 PROCEDURE — 83605 ASSAY OF LACTIC ACID: CPT

## 2025-07-09 PROCEDURE — 25010000002 MORPHINE PER 10 MG: Performed by: STUDENT IN AN ORGANIZED HEALTH CARE EDUCATION/TRAINING PROGRAM

## 2025-07-09 PROCEDURE — 25010000002 VANCOMYCIN HCL 1.25 G RECONSTITUTED SOLUTION 1 EACH VIAL: Performed by: EMERGENCY MEDICINE

## 2025-07-09 PROCEDURE — 83735 ASSAY OF MAGNESIUM: CPT | Performed by: EMERGENCY MEDICINE

## 2025-07-09 PROCEDURE — 93005 ELECTROCARDIOGRAM TRACING: CPT

## 2025-07-09 PROCEDURE — 84466 ASSAY OF TRANSFERRIN: CPT | Performed by: HOSPITALIST

## 2025-07-09 PROCEDURE — 36415 COLL VENOUS BLD VENIPUNCTURE: CPT

## 2025-07-09 PROCEDURE — 82803 BLOOD GASES ANY COMBINATION: CPT

## 2025-07-09 PROCEDURE — 87641 MR-STAPH DNA AMP PROBE: CPT | Performed by: HOSPITALIST

## 2025-07-09 PROCEDURE — 84484 ASSAY OF TROPONIN QUANT: CPT | Performed by: EMERGENCY MEDICINE

## 2025-07-09 PROCEDURE — 25810000003 SODIUM CHLORIDE 0.9 % SOLUTION: Performed by: EMERGENCY MEDICINE

## 2025-07-09 PROCEDURE — 94660 CPAP INITIATION&MGMT: CPT

## 2025-07-09 PROCEDURE — 99222 1ST HOSP IP/OBS MODERATE 55: CPT | Performed by: HOSPITALIST

## 2025-07-09 PROCEDURE — 99285 EMERGENCY DEPT VISIT HI MDM: CPT

## 2025-07-09 PROCEDURE — 85025 COMPLETE CBC W/AUTO DIFF WBC: CPT | Performed by: EMERGENCY MEDICINE

## 2025-07-09 PROCEDURE — 93005 ELECTROCARDIOGRAM TRACING: CPT | Performed by: EMERGENCY MEDICINE

## 2025-07-09 PROCEDURE — 94799 UNLISTED PULMONARY SVC/PX: CPT

## 2025-07-09 PROCEDURE — 25810000003 SODIUM CHLORIDE 0.9 % SOLUTION 250 ML FLEX CONT: Performed by: EMERGENCY MEDICINE

## 2025-07-09 PROCEDURE — 94760 N-INVAS EAR/PLS OXIMETRY 1: CPT

## 2025-07-09 PROCEDURE — 80053 COMPREHEN METABOLIC PANEL: CPT | Performed by: EMERGENCY MEDICINE

## 2025-07-09 PROCEDURE — 82607 VITAMIN B-12: CPT | Performed by: HOSPITALIST

## 2025-07-09 PROCEDURE — 80051 ELECTROLYTE PANEL: CPT

## 2025-07-09 PROCEDURE — 85007 BL SMEAR W/DIFF WBC COUNT: CPT | Performed by: EMERGENCY MEDICINE

## 2025-07-09 PROCEDURE — 83540 ASSAY OF IRON: CPT | Performed by: HOSPITALIST

## 2025-07-09 PROCEDURE — 87493 C DIFF AMPLIFIED PROBE: CPT | Performed by: EMERGENCY MEDICINE

## 2025-07-09 PROCEDURE — 87040 BLOOD CULTURE FOR BACTERIA: CPT | Performed by: EMERGENCY MEDICINE

## 2025-07-09 PROCEDURE — 82330 ASSAY OF CALCIUM: CPT

## 2025-07-09 PROCEDURE — 25010000002 CEFEPIME PER 500 MG: Performed by: EMERGENCY MEDICINE

## 2025-07-09 PROCEDURE — 82746 ASSAY OF FOLIC ACID SERUM: CPT | Performed by: HOSPITALIST

## 2025-07-09 PROCEDURE — 71045 X-RAY EXAM CHEST 1 VIEW: CPT

## 2025-07-09 PROCEDURE — 82948 REAGENT STRIP/BLOOD GLUCOSE: CPT

## 2025-07-09 PROCEDURE — 83605 ASSAY OF LACTIC ACID: CPT | Performed by: EMERGENCY MEDICINE

## 2025-07-09 PROCEDURE — 82728 ASSAY OF FERRITIN: CPT | Performed by: HOSPITALIST

## 2025-07-09 RX ORDER — LOSARTAN POTASSIUM 25 MG/1
25 TABLET ORAL
Status: DISCONTINUED | OUTPATIENT
Start: 2025-07-10 | End: 2025-07-18 | Stop reason: HOSPADM

## 2025-07-09 RX ORDER — CARVEDILOL 6.25 MG/1
6.25 TABLET ORAL 2 TIMES DAILY WITH MEALS
Status: DISCONTINUED | OUTPATIENT
Start: 2025-07-09 | End: 2025-07-18 | Stop reason: HOSPADM

## 2025-07-09 RX ORDER — CETIRIZINE HYDROCHLORIDE 10 MG/1
10 TABLET ORAL DAILY
Status: DISCONTINUED | OUTPATIENT
Start: 2025-07-10 | End: 2025-07-17

## 2025-07-09 RX ORDER — IPRATROPIUM BROMIDE AND ALBUTEROL SULFATE 2.5; .5 MG/3ML; MG/3ML
3 SOLUTION RESPIRATORY (INHALATION) EVERY 6 HOURS PRN
Status: DISCONTINUED | OUTPATIENT
Start: 2025-07-09 | End: 2025-07-18 | Stop reason: HOSPADM

## 2025-07-09 RX ORDER — AMOXICILLIN 250 MG
2 CAPSULE ORAL 2 TIMES DAILY PRN
Status: DISCONTINUED | OUTPATIENT
Start: 2025-07-09 | End: 2025-07-18 | Stop reason: HOSPADM

## 2025-07-09 RX ORDER — FLUTICASONE PROPIONATE 50 MCG
1 SPRAY, SUSPENSION (ML) NASAL DAILY
Status: DISCONTINUED | OUTPATIENT
Start: 2025-07-10 | End: 2025-07-18 | Stop reason: HOSPADM

## 2025-07-09 RX ORDER — ONDANSETRON 4 MG/1
4 TABLET, ORALLY DISINTEGRATING ORAL EVERY 6 HOURS PRN
Status: DISCONTINUED | OUTPATIENT
Start: 2025-07-09 | End: 2025-07-18 | Stop reason: HOSPADM

## 2025-07-09 RX ORDER — SODIUM CHLORIDE 0.9 % (FLUSH) 0.9 %
10 SYRINGE (ML) INJECTION EVERY 12 HOURS SCHEDULED
Status: DISCONTINUED | OUTPATIENT
Start: 2025-07-09 | End: 2025-07-18 | Stop reason: HOSPADM

## 2025-07-09 RX ORDER — SODIUM CHLORIDE 0.9 % (FLUSH) 0.9 %
10 SYRINGE (ML) INJECTION AS NEEDED
Status: DISCONTINUED | OUTPATIENT
Start: 2025-07-09 | End: 2025-07-18 | Stop reason: HOSPADM

## 2025-07-09 RX ORDER — POLYETHYLENE GLYCOL 3350 17 G/17G
17 POWDER, FOR SOLUTION ORAL DAILY PRN
Status: DISCONTINUED | OUTPATIENT
Start: 2025-07-09 | End: 2025-07-18 | Stop reason: HOSPADM

## 2025-07-09 RX ORDER — ONDANSETRON 2 MG/ML
4 INJECTION INTRAMUSCULAR; INTRAVENOUS EVERY 6 HOURS PRN
Status: DISCONTINUED | OUTPATIENT
Start: 2025-07-09 | End: 2025-07-18 | Stop reason: HOSPADM

## 2025-07-09 RX ORDER — PANTOPRAZOLE SODIUM 40 MG/10ML
40 INJECTION, POWDER, LYOPHILIZED, FOR SOLUTION INTRAVENOUS
Status: DISCONTINUED | OUTPATIENT
Start: 2025-07-10 | End: 2025-07-12

## 2025-07-09 RX ORDER — ROSUVASTATIN CALCIUM 5 MG/1
10 TABLET, COATED ORAL NIGHTLY
Status: DISCONTINUED | OUTPATIENT
Start: 2025-07-10 | End: 2025-07-18 | Stop reason: HOSPADM

## 2025-07-09 RX ORDER — ASPIRIN 81 MG/1
81 TABLET ORAL DAILY
Status: DISCONTINUED | OUTPATIENT
Start: 2025-07-10 | End: 2025-07-18 | Stop reason: HOSPADM

## 2025-07-09 RX ORDER — ACETAMINOPHEN 325 MG/1
650 TABLET ORAL EVERY 4 HOURS PRN
Status: DISCONTINUED | OUTPATIENT
Start: 2025-07-09 | End: 2025-07-18 | Stop reason: HOSPADM

## 2025-07-09 RX ORDER — TOBRAMYCIN INHALATION SOLUTION 300 MG/5ML
300 INHALANT RESPIRATORY (INHALATION)
Status: DISPENSED | OUTPATIENT
Start: 2025-07-09 | End: 2025-07-14

## 2025-07-09 RX ORDER — ACETAMINOPHEN 160 MG/5ML
650 SOLUTION ORAL EVERY 4 HOURS PRN
Status: DISCONTINUED | OUTPATIENT
Start: 2025-07-09 | End: 2025-07-18 | Stop reason: HOSPADM

## 2025-07-09 RX ORDER — ISOSORBIDE MONONITRATE 30 MG/1
30 TABLET, EXTENDED RELEASE ORAL NIGHTLY
Status: DISCONTINUED | OUTPATIENT
Start: 2025-07-10 | End: 2025-07-18 | Stop reason: HOSPADM

## 2025-07-09 RX ORDER — BISACODYL 5 MG/1
5 TABLET, DELAYED RELEASE ORAL DAILY PRN
Status: DISCONTINUED | OUTPATIENT
Start: 2025-07-09 | End: 2025-07-18 | Stop reason: HOSPADM

## 2025-07-09 RX ORDER — CLOPIDOGREL BISULFATE 75 MG/1
75 TABLET ORAL DAILY
Status: DISCONTINUED | OUTPATIENT
Start: 2025-07-10 | End: 2025-07-18 | Stop reason: HOSPADM

## 2025-07-09 RX ORDER — ACETAMINOPHEN 650 MG/1
650 SUPPOSITORY RECTAL EVERY 4 HOURS PRN
Status: DISCONTINUED | OUTPATIENT
Start: 2025-07-09 | End: 2025-07-18 | Stop reason: HOSPADM

## 2025-07-09 RX ORDER — ARFORMOTEROL TARTRATE 15 UG/2ML
15 SOLUTION RESPIRATORY (INHALATION)
Status: DISCONTINUED | OUTPATIENT
Start: 2025-07-09 | End: 2025-07-18 | Stop reason: HOSPADM

## 2025-07-09 RX ORDER — GABAPENTIN 100 MG/1
100 CAPSULE ORAL 2 TIMES DAILY
Status: DISCONTINUED | OUTPATIENT
Start: 2025-07-09 | End: 2025-07-17

## 2025-07-09 RX ORDER — LEVOTHYROXINE SODIUM 50 UG/1
50 TABLET ORAL
Status: DISCONTINUED | OUTPATIENT
Start: 2025-07-10 | End: 2025-07-18 | Stop reason: HOSPADM

## 2025-07-09 RX ORDER — BUDESONIDE 0.5 MG/2ML
0.5 INHALANT ORAL
Status: DISCONTINUED | OUTPATIENT
Start: 2025-07-09 | End: 2025-07-18 | Stop reason: HOSPADM

## 2025-07-09 RX ORDER — BISACODYL 10 MG
10 SUPPOSITORY, RECTAL RECTAL DAILY PRN
Status: DISCONTINUED | OUTPATIENT
Start: 2025-07-09 | End: 2025-07-18 | Stop reason: HOSPADM

## 2025-07-09 RX ORDER — HYDRALAZINE HYDROCHLORIDE 20 MG/ML
10 INJECTION INTRAMUSCULAR; INTRAVENOUS EVERY 8 HOURS PRN
Status: DISCONTINUED | OUTPATIENT
Start: 2025-07-09 | End: 2025-07-18 | Stop reason: HOSPADM

## 2025-07-09 RX ORDER — MORPHINE SULFATE 2 MG/ML
2 INJECTION, SOLUTION INTRAMUSCULAR; INTRAVENOUS EVERY 4 HOURS PRN
Status: DISCONTINUED | OUTPATIENT
Start: 2025-07-09 | End: 2025-07-14

## 2025-07-09 RX ORDER — SODIUM CHLORIDE 9 MG/ML
40 INJECTION, SOLUTION INTRAVENOUS AS NEEDED
Status: DISCONTINUED | OUTPATIENT
Start: 2025-07-09 | End: 2025-07-18 | Stop reason: HOSPADM

## 2025-07-09 RX ADMIN — TOBRAMYCIN 300 MG: 300 SOLUTION RESPIRATORY (INHALATION) at 23:14

## 2025-07-09 RX ADMIN — Medication 10 ML: at 22:32

## 2025-07-09 RX ADMIN — ARFORMOTEROL TARTRATE 15 MCG: 15 SOLUTION RESPIRATORY (INHALATION) at 23:14

## 2025-07-09 RX ADMIN — SODIUM CHLORIDE 2000 MG: 9 INJECTION, SOLUTION INTRAVENOUS at 18:05

## 2025-07-09 RX ADMIN — SODIUM CHLORIDE 500 ML: 9 INJECTION, SOLUTION INTRAVENOUS at 18:05

## 2025-07-09 RX ADMIN — BUDESONIDE 0.5 MG: 0.5 SUSPENSION RESPIRATORY (INHALATION) at 23:14

## 2025-07-09 RX ADMIN — MORPHINE SULFATE 2 MG: 2 INJECTION, SOLUTION INTRAMUSCULAR; INTRAVENOUS at 23:53

## 2025-07-09 RX ADMIN — VANCOMYCIN HYDROCHLORIDE 1250 MG: 1.25 INJECTION, POWDER, LYOPHILIZED, FOR SOLUTION INTRAVENOUS at 18:42

## 2025-07-09 NOTE — H&P
[facility]  Date: 2025   Patient Name: Charlette Rai  : 1935  MRN: [id number]  Primary Care Physician:  Brennan Mosqueda MD  Date of admission: 2025    Subjective fall  Subjective   Chief Complaint: Fall    HPI: Patient is an 89-year-old female with PAD, renal artery stenosis status post stenting, hypertension, ESRD on hemodialysis, diastolic CHF, COPD, lung cancer on chemotherapy, type 2 diabetes, hypothyroidism presents to the ER from her nursing home with altered mental status.  Patient has been having significant diarrhea for several days.  She has not eaten for a couple of days.  She has been getting IV fluids with dialysis because of hypotension.    Patient was recently in the hospital from 2025 to 7/3/2025.  At that time, she came in with a complaint of hematemesis and worsening weakness.  Patient had a EGD which showed esophagitis and gastritis.  She received a unit of blood.  She was restarted on aspirin.    Additionally, on 2025, patient fell had an acute periprosthetic femoral fracture and patient was sent to [facility] for repair.    On arrival to the ED, patient had a temperature of 96.1, pulse was 81, respiratory rate is 22, blood pressure is 136/70, and she is saturating 88% on room air.  Patient got 500 mL of fluid.  Blood pressure remained stable.  She is on 2 L of oxygen.    Chest x-ray shows: Volume loss left lung with haziness throughout the left hemithorax it has been noted.  Patient has a left upper lobe lobectomy.  Pleural-based calcifications in the left hemithorax that may relate to prior inflammatory infectious process or asbestos exposure.  Density in the right upper lobe that may be reflective of posttreatment change.    Head CT: There are periventricular and deep white matter changes which could reflect more chronic small vessel ischemic change.  Atherosclerotic vascular calcifications.  Bilateral mastoid disease.   There is right middle ear disease surrounding the ossicles.  Incidental osteoma of the right frontal sinus.    On labs, patient's sodium is 133, potassium is 3.4, chloride is 95, bicarb is 26.8.  Anion gap is 11.2.  Patient's ionized calcium is 1.09.  Alkaline phosphatase is 270.  Initial lactic is 2.9.  Follow-up lactic was 1.1.  Hemoglobin is 7.9.  MCV is 105.7.    UA shows a large amount of leukocytes, too many white blood cells to count.  C. difficile is negative.  Personal History     Past Medical History:  Past Medical History:   Diagnosis Date    Allergic rhinitis     Anaphylactic shock, unspecified, initial encounter 12/05/2023    Anemia     Arthritis     Asthma     USE INHALERS AND NEBULIZERS    Back pain     Bladder disorder     Cancer     LEFT LUNG CANCER 2005 SURGERY AND CHEMO DONE  AND CURRENTLY 4/ 14/22  RIGHT LUNG CANCER HAS ONLY RECEIVED RADIATION THUS FAR    Chronic kidney disease     stage 3    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 DENIES CP BUT DOES HAVE SOA CHRONIC ISSUE COPD/LUNG CANCER    COPD (chronic obstructive pulmonary disease)     FOLLOWED BY DR MARIAJOSE BAILEY    Coronary artery disease     DENIES CP BUT DOES GET SOA MOST OF THE TIME WITH EXERTION BUT OCC AT REST CHRONIC ISSUE COPD/LUNG CANCER    Deep vein thrombosis     Diabetes mellitus     DOES NOT CHECK BS DAILY    Disease of thyroid gland     HYPOTHYROIDISM    Essential hypertension     Gastric ulcer     GERD (gastroesophageal reflux disease)     Heart murmur     History of transfusion     NO ISSUES POST TRANSFUSION WAS MANY YEARS AGO    Hyperlipemia     Leukocytopenia     FOLLOWED BY DR MIR BAILEY    Limb swelling     Lumbago     Lumbar spinal stenosis     Lung cancer     Migraine headache     Multiple joint pain     On home oxygen therapy     4L/NC PRN    Osteopenia      PNA (pneumonia) 05/04/2024    Pseudomonas pneumonia 04/29/2024    Reflux esophagitis     Shortness of breath     Thyroid nodule     HAS ULTRASOUND YEARLY BEING MONITORED    Vascular disease     Vitamin D deficiency        Past Surgical History:  Past Surgical History:   Procedure Laterality Date    ABDOMINAL SURGERY      ANGIOPLASTY RENAL ARTERY Left 06/14/2024    stent placement    APPENDECTOMY      ARTERIOGRAM N/A 6/14/2024    Procedure: Arteriogram, right radial access, aortogram and renal arteriogram with possible intervention.;  Surgeon: Dio Miguel MD;  Location: [facility] CATH INVASIVE LOCATION;  Service: Peripheral Vascular;  Laterality: N/A;    ARTERIOVENOUS FISTULA/SHUNT SURGERY Left 05/10/2022    Procedure: Left basilic vein transposition;  Surgeon: Wan Barksdale MD;  Location: [facility] MAIN OR;  Service: Vascular;  Laterality: Left;    ARTERIOVENOUS FISTULA/SHUNT SURGERY Left 03/23/2023    Procedure: Ligation of left arm arteriovenous fistula;  Surgeon: Wan Barksdale MD;  Location: [facility] MAIN OR;  Service: Vascular;  Laterality: Left;    ARTERIOVENOUS FISTULA/SHUNT SURGERY Left 11/30/2023    Procedure: Creation of left arm arteriovenous graft;  Surgeon: Wan Barksdale MD;  Location: [facility] MAIN OR;  Service: Vascular;  Laterality: Left;    BRONCHOSCOPY N/A 04/24/2024    Procedure: BRONCHOSCOPY WITH BAL AND WASHINGS;  Surgeon: Khalif Yanez MD;  Location: [facility] ENDOSCOPY;  Service: Pulmonary;  Laterality: N/A;  MUCUS PLUGGING    CARDIAC CATHETERIZATION  1996    CARDIAC SURGERY      CARDIAC SURGERY      fluid drained from heart    CATARACT EXTRACTION, BILATERAL  2003    COLONOSCOPY  2014    ENDOSCOPY  2016    2019    ENDOSCOPY N/A 6/25/2025    Procedure: ESOPHAGOGASTRODUODENOSCOPY;  Surgeon: Tanvi Del Valle MD;  Location: [facility] ENDOSCOPY;  Service: Gastroenterology;  Laterality: N/A;  gastritis, esophagitis    FEMORAL ARTERY STENT Bilateral     HYSTERECTOMY      LEG THROMBECTOMY/EMBOLECTOMY  Left 2025    Procedure: LEFT LOWER EXTREMITY ANGIOGRAM;  Surgeon: Jaden Castañeda MD;  Location: [facility] HYBRID OR;  Service: Vascular;  Laterality: Left;    LUNG BIOPSY Left     lobectomy upper lung caner    LUNG VOLUME REDUCTION      OTHER SURGICAL HISTORY      artifical joints/limbs    REPLACEMENT TOTAL KNEE Left     SHUNT O GRAM N/A 2025    Procedure: dialysis shuntogram;  Surgeon: Court Valente MD;  Location: [facility] CATH INVASIVE LOCATION;  Service: Peripheral Vascular;  Laterality: N/A;    UPPER GASTROINTESTINAL ENDOSCOPY         Family History:   Family History   Problem Relation Age of Onset    Arthritis Mother     Arthritis Father     Cancer Brother     Diabetes Brother     Other Brother         blood disease     Prostate cancer Brother     Cancer Brother     Prostate cancer Brother     Cancer Brother     Cancer Brother     Malig Hyperthermia Neg Hx     Colon cancer Neg Hx        Social History:   Social History     Socioeconomic History    Marital status:    Tobacco Use    Smoking status: Former     Current packs/day: 0.00     Average packs/day: 1 pack/day for 52.5 years (52.5 ttl pk-yrs)     Types: Cigarettes     Start date:      Quit date: 2004     Years since quittin.0     Passive exposure: Past    Smokeless tobacco: Never   Vaping Use    Vaping status: Never Used   Substance and Sexual Activity    Alcohol use: Not Currently     Comment: beer many years ago    Drug use: Never    Sexual activity: Defer       Home Medications:  Methoxy PEG-Epoetin Beta, acetaminophen, albuterol sulfate HFA, aspirin, budesonide, carvedilol, clopidogrel, dexlansoprazole, ferrous gluconate, fluticasone, furosemide, gabapentin, ipratropium-albuterol, isosorbide mononitrate, levocetirizine, levothyroxine, lidocaine-prilocaine, losartan, rosuvastatin, tobramycin PF, and vitamin D    Allergies:  No Known Allergies    Review of Systems   All systems were reviewed and negative except for:  ams    Objective   Objective     Vitals:   Temp:  [96.1 °F (35.6 °C)-98.1 °F (36.7 °C)] 98.1 °F (36.7 °C)  Heart Rate:  [81-94] 88  Resp:  [22] 22  BP: (101-142)/(26-70) 142/51  Flow (L/min) (Oxygen Therapy):  [2] 2    Physical Exam    Constitutional: lethargic.  Very weak.     Eyes: Pupils equal, sclerae anicteric, no conjunctival injection   HENT: NCAT, mucous membranes moist   Neck: Supple, no thyromegaly, no lymphadenopathy, trachea midline   Respiratory: Clear to auscultation bilaterally, nonlabored respirations    Cardiovascular: RRR, no murmurs, rubs, or gallops, palpable pedal pulses bilaterally   Gastrointestinal: Positive bowel sounds, soft, nontender, nondistended   Musculoskeletal: No bilateral ankle edema, no clubbing or cyanosis to extremities   Psychiatric: Appropriate affect, cooperative   Neurologic: Oriented x 3, strength symmetric in all extremities, Cranial Nerves grossly intact to confrontation, speech clear   Skin: Multiple skin abrasions on legs and suture still present from prior leg surgery.    Result Review    Result Review:  I have personally reviewed the results from the time of this admission to 7/9/2025 18:58 EDT and agree with these findings:  [x]  Laboratory  [x]  Microbiology  [x]  Radiology  [x]  EKG/Telemetry   [x]  Cardiology/Vascular   [x]  Pathology  [x]  Old records  [x]  Other:      Assessment & Plan   Assessment / Plan   #1 urinary tract infection with altered mental status and history of hypotension  -Patient was recently treated for UTI.  No urine culture/sensitivity on file from prior hospitalization.  Patient treated with Rocephin on that admission.  -Patient empirically started on vancomycin and cefepime.  Patient has been in the hospital multiple times recently.  Will check MRSA screen.  - Okay to give IV fluids the patient's blood pressure is low.  - Follow urine culture and tailor antibiotics accordingly.  - N.p.o. Nursing dysphagia eval prior to giving anything  orally.  If patient passes, then allow her to have Nepro shakes.  She has not been eating for several days.  Nutrition consulted.  Have held all oral meds.  Patient is too lethargic to swallow safely.    #2 anemia multifactorial secondary to CKD, recent esophagitis and gastritis and iron deficiency  - Cover with Protonix IV twice daily.  Recheck FOBT since patient is having a lot of diarrhea.    #3 diarrhea  - Enteric parasite and bacterial stool panel ordered.  If does not improve, consider GI consult.    #4 end-stage renal disease on hemodialysis with hypokalemia  - Dr. Gracía consulted.    #5 severe protein calorie malnutrition  -Nutritionist consulted.    #6 chronic diastolic heart failure not acutely exacerbated    #7 hypothyroidism    #8 chronic hypoxemia on home oxygen  - Continue inhalers.  Patient on tobramycin nebulizer as well.    #9 recent femoral fracture with repair at [facility] and multiple wounds on the body  -will consult wound care to look at stitches and other wounds.          VTE Prophylaxis:  Mechanical VTE prophylaxis orders are signed & held.          CODE STATUS:    Code Status (Patient has no pulse and is not breathing): CPR (Attempt to Resuscitate)  Medical Interventions (Patient has pulse or is breathing): Full Support      Admission Status:  I believe this patient meets observation status.    Electronically signed by Maren Patterson DO, 07/09/25, 6:58 PM EDT.

## 2025-07-09 NOTE — Clinical Note
Level of Care: Med/Surg [1]   Diagnosis: UTI (urinary tract infection) [614131]   Admitting Physician: MIKA MATTHEWS [[id number]]   Attending Physician: MIKA MATTHEWS [[id number]]

## 2025-07-09 NOTE — ED PROVIDER NOTES
Time: 4:52 PM EDT  Date of encounter:  7/9/2025  Independent Historian/Clinical History and Information was obtained by:   Patient, Family, and EMS    History is limited by: Altered Mental Status    Chief Complaint: Altered mental status      History of Present Illness:  Patient is a 89 y.o. year old female who presents to the emergency department for evaluation of altered mental status.  The patient is a dialysis patient and she went to dialysis today and she is unusually lethargic.  Family states that she has actually been lethargic for the last several days.  She has had no fever, vomiting or other complaints.  She has had some diarrhea for the last several days      Patient Care Team  Primary Care Provider: Brennan Mosqueda MD    Past Medical History:     No Known Allergies  Past Medical History:   Diagnosis Date    Allergic rhinitis     Anaphylactic shock, unspecified, initial encounter 12/05/2023    Anemia     Arthritis     Asthma     USE INHALERS AND NEBULIZERS    Back pain     Bladder disorder     Cancer     LEFT LUNG CANCER 2005 SURGERY AND CHEMO DONE  AND CURRENTLY 4/ 14/22  RIGHT LUNG CANCER HAS ONLY RECEIVED RADIATION THUS FAR    Chronic kidney disease     stage 3    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 DENIES CP BUT DOES HAVE SOA CHRONIC ISSUE COPD/LUNG CANCER    COPD (chronic obstructive pulmonary disease)     FOLLOWED BY DR MARIAJOSE BAILEY    Coronary artery disease     DENIES CP BUT DOES GET SOA MOST OF THE TIME WITH EXERTION BUT OCC AT REST CHRONIC ISSUE COPD/LUNG CANCER    Deep vein thrombosis     Diabetes mellitus     DOES NOT CHECK BS DAILY    Disease of thyroid gland     HYPOTHYROIDISM    Essential hypertension     Gastric ulcer     GERD (gastroesophageal reflux disease)     Heart murmur     History of transfusion     NO  ISSUES POST TRANSFUSION WAS MANY YEARS AGO    Hyperlipemia     Leukocytopenia     FOLLOWED BY DR MIR BAILEY    Limb swelling     Lumbago     Lumbar spinal stenosis     Lung cancer     Migraine headache     Multiple joint pain     On home oxygen therapy     4L/NC PRN    Osteopenia     PNA (pneumonia) 05/04/2024    Pseudomonas pneumonia 04/29/2024    Reflux esophagitis     Shortness of breath     Thyroid nodule     HAS ULTRASOUND YEARLY BEING MONITORED    Vascular disease     Vitamin D deficiency      Past Surgical History:   Procedure Laterality Date    ABDOMINAL SURGERY      ANGIOPLASTY RENAL ARTERY Left 06/14/2024    stent placement    APPENDECTOMY      ARTERIOGRAM N/A 6/14/2024    Procedure: Arteriogram, right radial access, aortogram and renal arteriogram with possible intervention.;  Surgeon: Dio Miguel MD;  Location: [facility] CATH INVASIVE LOCATION;  Service: Peripheral Vascular;  Laterality: N/A;    ARTERIOVENOUS FISTULA/SHUNT SURGERY Left 05/10/2022    Procedure: Left basilic vein transposition;  Surgeon: Wan Barksdale MD;  Location: [facility] MAIN OR;  Service: Vascular;  Laterality: Left;    ARTERIOVENOUS FISTULA/SHUNT SURGERY Left 03/23/2023    Procedure: Ligation of left arm arteriovenous fistula;  Surgeon: Wan Barksdale MD;  Location: [facility] MAIN OR;  Service: Vascular;  Laterality: Left;    ARTERIOVENOUS FISTULA/SHUNT SURGERY Left 11/30/2023    Procedure: Creation of left arm arteriovenous graft;  Surgeon: Wan Barksdale MD;  Location: [facility] MAIN OR;  Service: Vascular;  Laterality: Left;    BRONCHOSCOPY N/A 04/24/2024    Procedure: BRONCHOSCOPY WITH BAL AND WASHINGS;  Surgeon: Khalif Yanez MD;  Location: [facility] ENDOSCOPY;  Service: Pulmonary;  Laterality: N/A;  MUCUS PLUGGING    CARDIAC CATHETERIZATION  1996    CARDIAC SURGERY      CARDIAC SURGERY      fluid drained from heart    CATARACT EXTRACTION, BILATERAL  2003    COLONOSCOPY  2014    ENDOSCOPY  2016    2019    ENDOSCOPY N/A 6/25/2025     Procedure: ESOPHAGOGASTRODUODENOSCOPY;  Surgeon: Tanvi Del Valle MD;  Location: [facility] ENDOSCOPY;  Service: Gastroenterology;  Laterality: N/A;  gastritis, esophagitis    FEMORAL ARTERY STENT Bilateral     HYSTERECTOMY      LEG THROMBECTOMY/EMBOLECTOMY Left 1/29/2025    Procedure: LEFT LOWER EXTREMITY ANGIOGRAM;  Surgeon: Jaden Castañeda MD;  Location: [facility] HYBRID OR;  Service: Vascular;  Laterality: Left;    LUNG BIOPSY Left 2005    lobectomy upper lung caner    LUNG VOLUME REDUCTION      OTHER SURGICAL HISTORY      artifical joints/limbs    REPLACEMENT TOTAL KNEE Left 2016    SHUNT O GRAM N/A 1/13/2025    Procedure: dialysis shuntogram;  Surgeon: Court Valente MD;  Location: [facility] CATH INVASIVE LOCATION;  Service: Peripheral Vascular;  Laterality: N/A;    UPPER GASTROINTESTINAL ENDOSCOPY       Family History   Problem Relation Age of Onset    Arthritis Mother     Arthritis Father     Cancer Brother     Diabetes Brother     Other Brother         blood disease     Prostate cancer Brother     Cancer Brother     Prostate cancer Brother     Cancer Brother     Cancer Brother     Malig Hyperthermia Neg Hx     Colon cancer Neg Hx        Home Medications:  Prior to Admission medications    Medication Sig Start Date End Date Taking? Authorizing Provider   acetaminophen (TYLENOL) 325 MG tablet Take 2 tablets by mouth Every 6 (Six) Hours As Needed for Mild Pain.    Provider, MD Sarah   albuterol sulfate  (90 Base) MCG/ACT inhaler Inhale 2 puffs Every 4 (Four) Hours As Needed for Wheezing. 4/22/25   Khalif Yanez MD   aspirin 81 MG EC tablet Take 1 tablet by mouth Daily.    Provider, MD Sarah   budesonide (Pulmicort) 0.5 MG/2ML nebulizer solution Take 2 mL by nebulization 2 (Two) Times a Day. 2/4/25   Vandana Hannah APRN   carvedilol (COREG) 6.25 MG tablet Take 1 tablet by mouth 2 (Two) Times a Day With Meals. 7/3/25   Patrick Turcios MD   clopidogrel (Plavix) 75 MG tablet Take 1 tablet by  mouth Daily. 5/23/25 5/23/26  Anali Sears APRN   dexlansoprazole (DEXILANT) 60 MG capsule Take 1 capsule by mouth Daily. 8/5/24   Brennan Mosqueda MD   ferrous gluconate (FERGON) 324 MG tablet Take 1 tablet by mouth 3 (Three) Times a Week. 3/26/25   Nicole Stern APRN   fluticasone (FLONASE) 50 MCG/ACT nasal spray Administer 1 spray into the nostril(s) as directed by provider Daily. 9/12/24   Sarah Parker MD   furosemide (LASIX) 20 MG tablet Take 1 tablet by mouth Daily. 6/9/25   Paloma Rodas APRN   gabapentin (Neurontin) 100 MG capsule Take 1 capsule by mouth 2 (Two) Times a Day for 3 days. 7/3/25 7/6/25  Patrick Turcios MD   ipratropium-albuterol (DUO-NEB) 0.5-2.5 mg/3 ml nebulizer Take 3 mL by nebulization Every 6 (Six) Hours As Needed for Wheezing. 2/4/25   Vandana Hannah APRN   isosorbide mononitrate (IMDUR) 30 MG 24 hr tablet Take 1 tablet by mouth Every Night. 9/17/24   Jazzy Addison APRN   levocetirizine (XYZAL) 5 MG tablet TAKE 1 TABLET DAILY 6/23/23   Paloma James MD   levothyroxine (SYNTHROID, LEVOTHROID) 50 MCG tablet Take 1 tablet by mouth Daily. 7/8/24   Sarah Parker MD   lidocaine-prilocaine (EMLA) 2.5-2.5 % cream Apply 1 Application topically to the appropriate area as directed Take As Directed. Apply to dialysis access site 30-45 minutes prior to dialysis 9/13/24   Sarah Parker MD   losartan (COZAAR) 25 MG tablet Take 1 tablet by mouth Daily. 7/4/25   Patrick Turcios MD   Methoxy PEG-Epoetin Beta (MIRCERA IJ) 200 mcg Every 14 (Fourteen) Days. 4/21/25 4/20/26  Sarah Parker MD   rosuvastatin (CRESTOR) 20 MG tablet Take 0.5 tablets by mouth Every Night. 7/3/25   Patrick Turcios MD   tobramycin PF (JUAN) 300 MG/5ML nebulizer solution Take 5 mL by nebulization 2 (Two) Times a Day. 30 days on 30 days off 9/12/24   Laura Franco APRN   vitamin D (ERGOCALCIFEROL) 1.25 MG (09892 UT) capsule capsule Take 1 capsule by mouth  "Every 14 (Fourteen) Days.  Patient taking differently: Take 1 capsule by mouth Every 14 (Fourteen) Days. The 15th and 30th of the month 10/24/24   Brennan Mosqueda MD        Social History:   Social History     Tobacco Use    Smoking status: Former     Current packs/day: 0.00     Average packs/day: 1 pack/day for 52.5 years (52.5 ttl pk-yrs)     Types: Cigarettes     Start date:      Quit date: 2004     Years since quittin.0     Passive exposure: Past    Smokeless tobacco: Never   Vaping Use    Vaping status: Never Used   Substance Use Topics    Alcohol use: Not Currently     Comment: beer many years ago    Drug use: Never         Review of Systems:  Review of Systems   Constitutional:  Negative for chills and fever.   HENT:  Negative for congestion, ear pain and sore throat.    Eyes:  Negative for pain.   Respiratory:  Negative for cough, chest tightness and shortness of breath.    Cardiovascular:  Negative for chest pain.   Gastrointestinal:  Negative for abdominal pain, diarrhea, nausea and vomiting.   Genitourinary:  Negative for flank pain and hematuria.   Musculoskeletal:  Negative for joint swelling.   Skin:  Negative for pallor.   Neurological:  Positive for weakness. Negative for seizures and headaches.   Psychiatric/Behavioral:  Positive for confusion.    All other systems reviewed and are negative.       Physical Exam:  BP (!) 137/33   Pulse 88   Temp 98.1 °F (36.7 °C) (Rectal)   Resp 22   Ht 157.5 cm (62\")   Wt 68.2 kg (150 lb 5.7 oz)   LMP  (LMP Unknown)   SpO2 99%   BMI 27.50 kg/m²     Physical Exam  Vitals and nursing note reviewed.   Constitutional:       General: She is not in acute distress.     Appearance: Normal appearance. She is ill-appearing. She is not toxic-appearing.   HENT:      Head: Normocephalic and atraumatic.      Mouth/Throat:      Mouth: Mucous membranes are moist.   Eyes:      General: No scleral icterus.     Extraocular Movements: Extraocular movements " intact.      Pupils: Pupils are equal, round, and reactive to light.   Cardiovascular:      Rate and Rhythm: Normal rate and regular rhythm.      Pulses: Normal pulses.      Heart sounds: Normal heart sounds.   Pulmonary:      Effort: Pulmonary effort is normal. No respiratory distress.      Breath sounds: Normal breath sounds.   Abdominal:      General: Abdomen is flat.      Palpations: Abdomen is soft.      Tenderness: There is no abdominal tenderness.   Musculoskeletal:         General: Normal range of motion.      Cervical back: Normal range of motion and neck supple.   Skin:     General: Skin is warm and dry.   Neurological:      Mental Status: She is lethargic, disoriented and confused.                    Medical Decision Making:      Comorbidities that affect care:    Chronic kidney disease    External Notes reviewed:    Previous Clinic Note: Office visit with Dr. García      The following orders were placed and all results were independently analyzed by me:  Orders Placed This Encounter   Procedures    Blood Culture - Blood,    Blood Culture - Blood,    Clostridioides difficile Toxin - Stool, Per Rectum    Clostridioides difficile Toxin, PCR - Stool, Per Rectum    XR Chest 1 View    CT Head Without Contrast    Jackpot Draw    Comprehensive Metabolic Panel    High Sensitivity Troponin T    Magnesium    Urinalysis With Microscopic If Indicated (No Culture) - Urine, Clean Catch    CBC Auto Differential    Lactic Acid, Plasma    Urinalysis, Microscopic Only - Urine, Clean Catch    Scan Slide    High Sensitivity Troponin T 1Hr    STAT Lactic Acid, Reflex    Venous Blood Gas,H&H,Lytes,Lactate    Blood Gas, Venous -    NPO Diet NPO Type: Strict NPO    Undress & Gown    Continuous Pulse Oximetry    Vital Signs    Orthostatic Blood Pressure    Discontinue Cardiac Monitoring    Code Status and Medical Interventions: CPR (Attempt to Resuscitate); Full Support    Hospitalist (on-call MD unless specified)    Nephrology   (on-call MD unless specified)    Patient Isolation Contact Spore    Oxygen Therapy- Nasal Cannula; Titrate 1-6 LPM Per SpO2; 90 - 95%    POC Glucose Once    POC Glucose Once    POC Lactate    POC Electrolyte Panel    ECG 12 Lead Altered Mental Status    Insert Peripheral IV    Initiate Observation Status    Fall Precautions    CBC & Differential    Green Top (Gel)    Lavender Top    Gold Top - SST    Light Blue Top       Medications Given in the Emergency Department:  Medications   sodium chloride 0.9 % flush 10 mL (has no administration in time range)   Vancomycin HCl 1,250 mg in sodium chloride 0.9 % 250 mL VTB (1,250 mg Intravenous New Bag 7/9/25 1842)   sodium chloride 0.9 % bolus 500 mL (0 mL Intravenous Stopped 7/9/25 1837)   cefepime 2000 mg IVPB in 100 mL NS (VTB) (0 mg Intravenous Stopped 7/9/25 1837)        ED Course:         Labs:    Lab Results (last 24 hours)       Procedure Component Value Units Date/Time    Urinalysis With Microscopic If Indicated (No Culture) - Urine, Clean Catch [[id number]]  (Abnormal) Collected: 07/09/25 1508    Specimen: Urine, Clean Catch Updated: 07/09/25 1531     Color, UA Yellow     Appearance, UA Turbid     pH, UA 6.0     Specific Gravity, UA 1.016     Glucose, UA Negative     Ketones, UA Trace     Bilirubin, UA Negative     Blood, UA Small (1+)     Protein,  mg/dL (2+)     Leuk Esterase, UA Large (3+)     Nitrite, UA Negative     Urobilinogen, UA 0.2 E.U./dL    Urinalysis, Microscopic Only - Urine, Clean Catch [[id number]]  (Abnormal) Collected: 07/09/25 1508    Specimen: Urine, Clean Catch Updated: 07/09/25 1601     RBC, UA 0-2 /HPF      WBC, UA Too Numerous to Count /HPF      Bacteria, UA 3+ /HPF      Squamous Epithelial Cells, UA 7-12 /HPF      Hyaline Casts, UA None Seen /LPF      Methodology Manual Light Microscopy    CBC & Differential [[id number]]  (Abnormal) Collected: 07/09/25 1529    Specimen: Blood Updated: 07/09/25 1559    Narrative:      The following orders  were created for panel order CBC & Differential.  Procedure                               Abnormality         Status                     ---------                               -----------         ------                     CBC Auto Differential[[id number]]        Abnormal            Final result               Scan Slide[[id number]]                                       Final result                 Please view results for these tests on the individual orders.    Comprehensive Metabolic Panel [[id number]]  (Abnormal) Collected: 07/09/25 1529    Specimen: Blood Updated: 07/09/25 1605     Glucose 108 mg/dL      BUN 27.0 mg/dL      Creatinine 2.16 mg/dL      Sodium 133 mmol/L      Potassium 3.4 mmol/L      Chloride 95 mmol/L      CO2 26.8 mmol/L      Calcium 8.1 mg/dL      Total Protein 5.1 g/dL      Albumin 2.7 g/dL      ALT (SGPT) 32 U/L      AST (SGOT) 113 U/L      Alkaline Phosphatase 270 U/L      Total Bilirubin 0.4 mg/dL      Globulin 2.4 gm/dL      A/G Ratio 1.1 g/dL      BUN/Creatinine Ratio 12.5     Anion Gap 11.2 mmol/L      eGFR 21.5 mL/min/1.73     Narrative:      GFR Categories in Chronic Kidney Disease (CKD)              GFR Category          GFR (mL/min/1.73)    Interpretation  G1                    90 or greater        Normal or high (1)  G2                    60-89                Mild decrease (1)  G3a                   45-59                Mild to moderate decrease  G3b                   30-44                Moderate to severe decrease  G4                    15-29                Severe decrease  G5                    14 or less           Kidney failure    (1)In the absence of evidence of kidney disease, neither GFR category G1 or G2 fulfill the criteria for CKD.    eGFR calculation 2021 CKD-EPI creatinine equation, which does not include race as a factor    High Sensitivity Troponin T [[id number]]  (Abnormal) Collected: 07/09/25 1529    Specimen: Blood Updated: 07/09/25 1619     HS Troponin T 161 ng/L      Narrative:      High Sensitive Troponin T Reference Range:  <14.0 ng/L- Negative Female for AMI  <22.0 ng/L- Negative Male for AMI  >=14 - Abnormal Female indicating possible myocardial injury.  >=22 - Abnormal Male indicating possible myocardial injury.   Clinicians would have to utilize clinical acumen, EKG, Troponin, and serial changes to determine if it is an Acute Myocardial Infarction or myocardial injury due to an underlying chronic condition.         Magnesium [[id number]]  (Normal) Collected: 07/09/25 1529    Specimen: Blood Updated: 07/09/25 1605     Magnesium 1.9 mg/dL     CBC Auto Differential [[id number]]  (Abnormal) Collected: 07/09/25 1529    Specimen: Blood Updated: 07/09/25 1559     WBC 20.22 10*3/mm3      RBC 2.47 10*6/mm3      Hemoglobin 7.9 g/dL      Hematocrit 26.1 %      .7 fL      MCH 32.0 pg      MCHC 30.3 g/dL      RDW 20.5 %      RDW-SD 78.3 fl      MPV 9.6 fL      Platelets 144 10*3/mm3      Neutrophil % 89.8 %      Lymphocyte % 1.6 %      Monocyte % 6.0 %      Eosinophil % 0.6 %      Basophil % 0.5 %      Immature Grans % 1.5 %      Neutrophils, Absolute 18.14 10*3/mm3      Lymphocytes, Absolute 0.33 10*3/mm3      Monocytes, Absolute 1.21 10*3/mm3      Eosinophils, Absolute 0.12 10*3/mm3      Basophils, Absolute 0.11 10*3/mm3      Immature Grans, Absolute 0.31 10*3/mm3      nRBC 0.0 /100 WBC     Blood Culture - Blood, Arm, Right [[id number]] Collected: 07/09/25 1529    Specimen: Blood from Arm, Right Updated: 07/09/25 1533    Lactic Acid, Plasma [[id number]]  (Abnormal) Collected: 07/09/25 1529    Specimen: Blood from Arm, Right Updated: 07/09/25 1619     Lactate 2.9 mmol/L     Scan Slide [[id number]] Collected: 07/09/25 1529    Specimen: Blood Updated: 07/09/25 1559     Anisocytosis Slight/1+     Macrocytes Slight/1+     WBC Morphology Normal     Platelet Estimate Adequate    Blood Culture - Blood, Arm, Right [[id number]] Collected: 07/09/25 8587    Specimen: Blood from Arm, Right  Updated: 07/09/25 1635    High Sensitivity Troponin T 1Hr [[id number]]  (Abnormal) Collected: 07/09/25 1707    Specimen: Blood from Arm, Right Updated: 07/09/25 1754     HS Troponin T 164 ng/L      Troponin T Numeric Delta 3 ng/L      Troponin T % Delta 2    Narrative:      High Sensitive Troponin T Reference Range:  <14.0 ng/L- Negative Female for AMI  <22.0 ng/L- Negative Male for AMI  >=14 - Abnormal Female indicating possible myocardial injury.  >=22 - Abnormal Male indicating possible myocardial injury.   Clinicians would have to utilize clinical acumen, EKG, Troponin, and serial changes to determine if it is an Acute Myocardial Infarction or myocardial injury due to an underlying chronic condition.         STAT Lactic Acid, Reflex [[id number]]  (Normal) Collected: 07/09/25 1707    Specimen: Blood from Arm, Right Updated: 07/09/25 1739     Lactate 1.4 mmol/L     Clostridioides difficile Toxin - Stool, Per Rectum [[id number]] Collected: 07/09/25 1722    Specimen: Stool from Per Rectum Updated: 07/09/25 1812    Narrative:      The following orders were created for panel order Clostridioides difficile Toxin - Stool, Per Rectum.  Procedure                               Abnormality         Status                     ---------                               -----------         ------                     Clostridioides difficile...[[id number]]                      Final result                 Please view results for these tests on the individual orders.    Clostridioides difficile Toxin, PCR - Stool, Per Rectum [[id number]] Collected: 07/09/25 1722    Specimen: Stool from Per Rectum Updated: 07/09/25 1812     Toxigenic C. difficile by PCR Negative     027 Toxin Presumptive Negative    Narrative:      The result indicates the absence of toxigenic C. difficile from stool specimen.     POC Glucose Once [[id number]]  (Normal) Collected: 07/09/25 1722    Specimen: Venous Blood Updated: 07/09/25 1725     Glucose 98 mg/dL       Comment: Serial Number: [id number]:  [id number]       Blood Gas, Venous - [[id number]]  (Abnormal) Collected: 07/09/25 1722    Specimen: Venous Blood Updated: 07/09/25 1725     pH, Venous 7.360 pH Units      pCO2, Venous 56.9 mm Hg      pO2, Venous 27.0 mm Hg      HCO3, Venous 32.1 mmol/L      Base Excess, Venous 5.8 mmol/L      Comment: Serial Number: [id number]:  [id number]        O2 Saturation, Venous 46.0 %      Hemoglobin, Blood Gas 8.2 g/dL      Barometric Pressure for Blood Gas 742.4000 mmHg      Modality Cannula     Flow Rate 2.0000 lpm     POC Lactate [[id number]]  (Normal) Collected: 07/09/25 1722    Specimen: Venous Blood Updated: 07/09/25 1725     Lactate 1.1 mmol/L      Comment: Serial Number: [id number]:  [id number]       POC Electrolyte Panel [[id number]]  (Abnormal) Collected: 07/09/25 1722    Specimen: Venous Blood Updated: 07/09/25 1725     Sodium 134 mmol/L      POC Potassium 3.1 mmol/L      Chloride 94 mmol/L      Ionized Calcium 1.09 mmol/L      Comment: Serial Number: [id number]:  [id number]                Imaging:    CT Head Without Contrast  Result Date: 7/9/2025  CT HEAD WO CONTRAST Date of Exam: 7/9/2025 5:39 PM EDT Indication: Altered mental status. Comparison: CT head June 6, 2025 Technique: Axial CT images were obtained of the head without contrast administration.  Reconstructed coronal and sagittal images were also obtained. Automated exposure control and iterative construction methods were used. Findings: There are periventricular and deep white matter changes which could reflect more chronic small vessel ischemic change. There is vascular calcification in the area of the carotid siphon and distal left vertebral artery. There is bilateral mastoid disease.  There is right middle ear disease surrounding the ossicles. An incidental osteoma is noted in the right frontal sinus.     Impression: 1.There are periventricular and deep white matter changes which could reflect more chronic small vessel  ischemic change. 2.Atherosclerotic vascular calcification. 3.Bilateral mastoid disease. There is right middle ear disease surrounding the ossicles. 4.Incidental osteoma right frontal sinus. Electronically Signed: Tony Kurtz MD  7/9/2025 5:54 PM EDT  Workstation ID: [id number]    XR Chest 1 View  Result Date: 7/9/2025  XR CHEST 1 VW Date of Exam: 7/9/2025 2:45 PM EDT Indication: Weak/Dizzy/AMS triage protocol Comparison: June 24, 2025 Findings: Central venous catheter is noted with its tip in the superior vena cava. There is volume loss of the left lung with haziness throughout the left hemithorax which has been noted. Patient has had a left upper lobe lobectomy. There is pleural-based calcification within the left hemithorax that may relate to prior inflammatory infectious process or potentially asbestos exposure. A left pleural effusion cannot be excluded. There remains a density in the right upper lobe this may be reflective of posttreatment change. There is no right pleural effusion. A vascular stent graft is seen in the left upper arm.     Impression: 1.Volume loss left lung with haziness throughout the left hemithorax that has been noted. Patient has had a left upper lobe lobectomy. 2.Pleural-based calcification left hemithorax that may relate to prior inflammatory infectious process or asbestos exposure. 3.Density right upper lobe that may be reflective of posttreatment change. Electronically Signed: Tony Kurtz MD  7/9/2025 3:02 PM EDT  Workstation ID: [id number]        Differential Diagnosis and Discussion:    Altered Mental Status: Based on the patient's signs and symptoms, differential diagnosis includes but is not limited to meningitis, stroke, sepsis, subarachnoid hemorrhage, intracranial bleeding, encephalitis, and metabolic encephalopathy.    PROCEDURES:    Labs were collected in the emergency department and all labs were reviewed and interpreted by me.  X-ray were performed in the emergency department  and all X-ray impressions were independently interpreted by me.  An EKG was performed and the EKG was interpreted by me.    ECG 12 Lead Altered Mental Status   Preliminary Result   HEART RATE=83  bpm   RR Zispphmq=217  ms   SC Interval=69  ms   P Horizontal Axis=20  deg   P Front Axis=-87  deg   QRSD Yrjgfzxt=610  ms   QT Ayujtwdq=740  ms   QWzI=878  ms   QRS Axis=83  deg   T Wave Axis=132  deg   - ABNORMAL ECG -   Sinus or ectopic atrial rhythm   Atrial premature complex   Short SC interval   Borderline right axis deviation   Probable left ventricular hypertrophy   Anterior Q waves, possibly due to [facility]   Nonspecific T abnormalities, lateral leads   When compared with ECG of 06-Jun-2025 20:27:29,   Significant change in rhythm: previously sinus   Significant repolarization change   Date and Time of Study:2025-07-09 14:49:19          Procedures    MDM     Amount and/or Complexity of Data Reviewed  Clinical lab tests: reviewed  Tests in the radiology section of CPT®: reviewed  Tests in the medicine section of CPT®: reviewed                       Patient Care Considerations:    SEPSIS was considered but is NOT present in the emergency department as SIRS criteria is not present.      Consultants/Shared Management Plan:    Hospitalist: I have discussed the case with hospital who agrees to accept the patient for admission.    Social Determinants of Health:    Patient is unable to carry out activities of daily life. Escalation of care is necessary.       Disposition and Care Coordination:    Admit:   Through independent evaluation of the patient's history, physical, and imperical data, the patient meets criteria for inpatient admission to the hospital.        Final diagnoses:   Acute UTI   Altered mental status, unspecified altered mental status type        ED Disposition       ED Disposition   Decision to Admit    Condition   --    Comment   Level of Care: Med/Surg [1]   Diagnosis: UTI (urinary tract infection) [491488]    Admitting Physician: MIKA MATTHEWS [[id number]]   Attending Physician: MIKA MATTHEWS [[id number]]                 This medical record created using voice recognition software.             Jose Manuel Saunders,   07/09/25 1857       Jose Manuel Saunders,   07/09/25 1911

## 2025-07-10 LAB
ANION GAP SERPL CALCULATED.3IONS-SCNC: 11.1 MMOL/L (ref 5–15)
BASOPHILS # BLD AUTO: 0.05 10*3/MM3 (ref 0–0.2)
BASOPHILS NFR BLD AUTO: 0.3 % (ref 0–1.5)
BUN SERPL-MCNC: 32.1 MG/DL (ref 8–23)
BUN/CREAT SERPL: 12.2 (ref 7–25)
CALCIUM SPEC-SCNC: 7.5 MG/DL (ref 8.6–10.5)
CHLORIDE SERPL-SCNC: 99 MMOL/L (ref 98–107)
CO2 SERPL-SCNC: 23.9 MMOL/L (ref 22–29)
CREAT SERPL-MCNC: 2.63 MG/DL (ref 0.57–1)
DEPRECATED RDW RBC AUTO: 75.5 FL (ref 37–54)
EGFRCR SERPLBLD CKD-EPI 2021: 17 ML/MIN/1.73
EOSINOPHIL # BLD AUTO: 0.07 10*3/MM3 (ref 0–0.4)
EOSINOPHIL NFR BLD AUTO: 0.4 % (ref 0.3–6.2)
ERYTHROCYTE [DISTWIDTH] IN BLOOD BY AUTOMATED COUNT: 19.9 % (ref 12.3–15.4)
FOLATE SERPL-MCNC: 9.05 NG/ML (ref 4.78–24.2)
GLUCOSE BLDC GLUCOMTR-MCNC: 164 MG/DL (ref 70–99)
GLUCOSE BLDC GLUCOMTR-MCNC: 62 MG/DL (ref 70–99)
GLUCOSE SERPL-MCNC: 86 MG/DL (ref 65–99)
HCT VFR BLD AUTO: 25.7 % (ref 34–46.6)
HGB BLD-MCNC: 8 G/DL (ref 12–15.9)
IMM GRANULOCYTES # BLD AUTO: 0.13 10*3/MM3 (ref 0–0.05)
IMM GRANULOCYTES NFR BLD AUTO: 0.7 % (ref 0–0.5)
LYMPHOCYTES # BLD AUTO: 0.2 10*3/MM3 (ref 0.7–3.1)
LYMPHOCYTES NFR BLD AUTO: 1.1 % (ref 19.6–45.3)
MAGNESIUM SERPL-MCNC: 1.9 MG/DL (ref 1.6–2.4)
MCH RBC QN AUTO: 32.3 PG (ref 26.6–33)
MCHC RBC AUTO-ENTMCNC: 31.1 G/DL (ref 31.5–35.7)
MCV RBC AUTO: 103.6 FL (ref 79–97)
MONOCYTES # BLD AUTO: 1.21 10*3/MM3 (ref 0.1–0.9)
MONOCYTES NFR BLD AUTO: 6.7 % (ref 5–12)
MRSA DNA SPEC QL NAA+PROBE: NORMAL
NEUTROPHILS NFR BLD AUTO: 16.44 10*3/MM3 (ref 1.7–7)
NEUTROPHILS NFR BLD AUTO: 90.8 % (ref 42.7–76)
NRBC BLD AUTO-RTO: 0 /100 WBC (ref 0–0.2)
PHOSPHATE SERPL-MCNC: 3.4 MG/DL (ref 2.5–4.5)
PLATELET # BLD AUTO: 136 10*3/MM3 (ref 140–450)
PMV BLD AUTO: 9.6 FL (ref 6–12)
POTASSIUM SERPL-SCNC: 3.5 MMOL/L (ref 3.5–5.2)
RBC # BLD AUTO: 2.48 10*6/MM3 (ref 3.77–5.28)
SODIUM SERPL-SCNC: 134 MMOL/L (ref 136–145)
VANCOMYCIN SERPL-MCNC: 14.81 MCG/ML (ref 5–40)
VIT B12 BLD-MCNC: 1517 PG/ML (ref 211–946)
WBC NRBC COR # BLD AUTO: 18.1 10*3/MM3 (ref 3.4–10.8)

## 2025-07-10 PROCEDURE — 25010000002 VANCOMYCIN PER 500 MG: Performed by: INTERNAL MEDICINE

## 2025-07-10 PROCEDURE — 84100 ASSAY OF PHOSPHORUS: CPT | Performed by: HOSPITALIST

## 2025-07-10 PROCEDURE — 99232 SBSQ HOSP IP/OBS MODERATE 35: CPT | Performed by: INTERNAL MEDICINE

## 2025-07-10 PROCEDURE — 94799 UNLISTED PULMONARY SVC/PX: CPT

## 2025-07-10 PROCEDURE — 25010000002 EPOETIN ALFA PER 1000 UNITS: Performed by: INTERNAL MEDICINE

## 2025-07-10 PROCEDURE — 94760 N-INVAS EAR/PLS OXIMETRY 1: CPT

## 2025-07-10 PROCEDURE — 87040 BLOOD CULTURE FOR BACTERIA: CPT | Performed by: INTERNAL MEDICINE

## 2025-07-10 PROCEDURE — 82948 REAGENT STRIP/BLOOD GLUCOSE: CPT

## 2025-07-10 PROCEDURE — 36415 COLL VENOUS BLD VENIPUNCTURE: CPT | Performed by: HOSPITALIST

## 2025-07-10 PROCEDURE — 85025 COMPLETE CBC W/AUTO DIFF WBC: CPT | Performed by: HOSPITALIST

## 2025-07-10 PROCEDURE — 25010000002 MORPHINE PER 10 MG: Performed by: STUDENT IN AN ORGANIZED HEALTH CARE EDUCATION/TRAINING PROGRAM

## 2025-07-10 PROCEDURE — 25010000002 CEFEPIME PER 500 MG: Performed by: HOSPITALIST

## 2025-07-10 PROCEDURE — 80048 BASIC METABOLIC PNL TOTAL CA: CPT | Performed by: HOSPITALIST

## 2025-07-10 PROCEDURE — 92610 EVALUATE SWALLOWING FUNCTION: CPT

## 2025-07-10 PROCEDURE — 80202 ASSAY OF VANCOMYCIN: CPT | Performed by: HOSPITALIST

## 2025-07-10 PROCEDURE — 83735 ASSAY OF MAGNESIUM: CPT | Performed by: HOSPITALIST

## 2025-07-10 PROCEDURE — 25010000002 HEPARIN (PORCINE) PER 1000 UNITS: Performed by: INTERNAL MEDICINE

## 2025-07-10 PROCEDURE — 94664 DEMO&/EVAL PT USE INHALER: CPT

## 2025-07-10 RX ORDER — DEXTROSE MONOHYDRATE 25 G/50ML
25 INJECTION, SOLUTION INTRAVENOUS
Status: DISCONTINUED | OUTPATIENT
Start: 2025-07-10 | End: 2025-07-18 | Stop reason: HOSPADM

## 2025-07-10 RX ORDER — HEPARIN SODIUM 1000 [USP'U]/ML
3200 INJECTION, SOLUTION INTRAVENOUS; SUBCUTANEOUS AS NEEDED
Status: DISCONTINUED | OUTPATIENT
Start: 2025-07-10 | End: 2025-07-18 | Stop reason: HOSPADM

## 2025-07-10 RX ORDER — NICOTINE POLACRILEX 4 MG
15 LOZENGE BUCCAL
Status: DISCONTINUED | OUTPATIENT
Start: 2025-07-10 | End: 2025-07-18 | Stop reason: HOSPADM

## 2025-07-10 RX ORDER — IBUPROFEN 600 MG/1
1 TABLET ORAL
Status: DISCONTINUED | OUTPATIENT
Start: 2025-07-10 | End: 2025-07-18 | Stop reason: HOSPADM

## 2025-07-10 RX ADMIN — Medication 10 ML: at 08:52

## 2025-07-10 RX ADMIN — ERYTHROPOIETIN 30000 UNITS: 20000 INJECTION, SOLUTION INTRAVENOUS; SUBCUTANEOUS at 17:22

## 2025-07-10 RX ADMIN — TOBRAMYCIN 300 MG: 300 SOLUTION RESPIRATORY (INHALATION) at 20:47

## 2025-07-10 RX ADMIN — MORPHINE SULFATE 2 MG: 2 INJECTION, SOLUTION INTRAMUSCULAR; INTRAVENOUS at 03:55

## 2025-07-10 RX ADMIN — FLUTICASONE PROPIONATE 1 SPRAY: 50 SPRAY, METERED NASAL at 08:51

## 2025-07-10 RX ADMIN — Medication 10 ML: at 20:18

## 2025-07-10 RX ADMIN — BUDESONIDE 0.5 MG: 0.5 SUSPENSION RESPIRATORY (INHALATION) at 20:47

## 2025-07-10 RX ADMIN — PANTOPRAZOLE SODIUM 40 MG: 40 INJECTION, POWDER, FOR SOLUTION INTRAVENOUS at 17:41

## 2025-07-10 RX ADMIN — HEPARIN SODIUM 3200 UNITS: 1000 INJECTION INTRAVENOUS; SUBCUTANEOUS at 11:44

## 2025-07-10 RX ADMIN — PANTOPRAZOLE SODIUM 40 MG: 40 INJECTION, POWDER, FOR SOLUTION INTRAVENOUS at 08:51

## 2025-07-10 RX ADMIN — VANCOMYCIN HYDROCHLORIDE 500 MG: 500 INJECTION, POWDER, LYOPHILIZED, FOR SOLUTION INTRAVENOUS at 16:02

## 2025-07-10 RX ADMIN — TOBRAMYCIN 300 MG: 300 SOLUTION RESPIRATORY (INHALATION) at 09:33

## 2025-07-10 RX ADMIN — ARFORMOTEROL TARTRATE 15 MCG: 15 SOLUTION RESPIRATORY (INHALATION) at 09:33

## 2025-07-10 RX ADMIN — BUDESONIDE 0.5 MG: 0.5 SUSPENSION RESPIRATORY (INHALATION) at 09:33

## 2025-07-10 RX ADMIN — ARFORMOTEROL TARTRATE 15 MCG: 15 SOLUTION RESPIRATORY (INHALATION) at 20:47

## 2025-07-10 RX ADMIN — CEFEPIME 1000 MG: 1 INJECTION, POWDER, FOR SOLUTION INTRAMUSCULAR; INTRAVENOUS at 17:42

## 2025-07-10 RX ADMIN — DEXTROSE MONOHYDRATE 25 G: 25 INJECTION, SOLUTION INTRAVENOUS at 20:41

## 2025-07-10 NOTE — PROGRESS NOTES
Patient wore BIPAP last night from 1130- to sometime after 6 this morning. Patient tolerated well, is alert and responding to questions appropriately. Settings are 12/6 285. She is currently on 2 l/m NC- SATS 99

## 2025-07-10 NOTE — PLAN OF CARE
Goal Outcome Evaluation:      ASSESSMENT/ PLAN OF CARE:  Pt presents with limitations, noted below, that impede patients ability to swallow safely and maintain nutrition. The skills of a therapist will be required to safely and effectively implement the following treatment plan to restore maximal level of function.    PROBLEMS:  1.  Risk of aspiration, swallow delay, not maintaining alertness for safe po diet   TREATMENT: speech therapy for dysphagia, tolerance of trials of po for progression to safe po diet.       FREQUENCY/DURATION:  daily, 5 days a week    REHAB POTENTIAL:  Pt has good/fair rehab potential.  The following limitations may influence improvement/ length of tx  medical status.    RECOMMENDATIONS:   1.   DIET: Due to patient not maintaining consistent alertness, cannot recommend safe po diet. If safe po diet not initiated next 24 hours, patient may need alternative feeding method.                Anticipated Discharge Disposition (SLP): anticipate therapy at next level of care

## 2025-07-10 NOTE — THERAPY EVALUATION
Acute Care - Speech Language Pathology   Swallow Initial Evaluation MAURICE Escamilla     Patient Name: Charlette Rai  : 1935  MRN: [id number]  Today's Date: 7/10/2025               Admit Date: 2025    Visit Dx:     ICD-10-CM ICD-9-CM   1. Acute UTI  N39.0 599.0   2. Altered mental status, unspecified altered mental status type  R41.82 780.97   3. Oropharyngeal dysphagia  R13.12 787.22     Patient Active Problem List   Diagnosis    Malignant neoplasm of upper lobe of right lung    Rheumatoid arthritis    Asthma    Chronic heart failure with preserved ejection fraction    Essential hypertension    GERD (gastroesophageal reflux disease)    Hyperlipidemia LDL goal <70    Lumbar spinal stenosis    Migraine    Monoclonal paraproteinemia    Osteopenia    Oxygen dependent    Peripheral neuropathy    Stage 4 chronic kidney disease    Vitamin D deficiency    Carotid artery stenosis    Chronic right-sided thoracic back pain    Peripheral vascular disease of lower extremity    Ex-smoker    De Quervain's tenosynovitis    Arthritis of carpometacarpal (CMC) joint of right thumb    Steal syndrome of dialysis vascular access    Anemia of chronic renal failure, stage 4 (severe)    Aortic stenosis, moderate    Hematoma    End stage renal disease on dialysis    Coronary artery calcification seen on CT scan    Frailty syndrome in geriatric patient    COPD with lower respiratory infection    Coagulation defect, unspecified    Hyperphosphatemia    Hypothyroidism (acquired)    Idiopathic osteoarthritis    Secondary multiple arthritis    Type 2 diabetes mellitus with diabetic peripheral angiopathy without gangrene    Renal artery stenosis    S/P renal artery angioplasty    Atherosclerosis of renal artery    Abnormal serum immunoelectrophoresis    NSVT (nonsustained ventricular tachycardia)    Hypertensive heart and chronic kidney disease with heart failure and with stage 5 chronic kidney disease, or end stage renal disease     Unspecified protein-calorie malnutrition    IFG (impaired fasting glucose)    Iron deficiency anemia    AV graft malfunction, initial encounter    AV graft malfunction    Weakness    Malignant neoplasm of lower lobe of left lung    Malignant neoplasm of lower lobe, right bronchus or lung    Severe protein-calorie malnutrition    UTI (urinary tract infection)     Past Medical History:   Diagnosis Date    Allergic rhinitis     Anaphylactic shock, unspecified, initial encounter 12/05/2023    Anemia     Arthritis     Asthma     USE INHALERS AND NEBULIZERS    Back pain     Bladder disorder     Cancer     LEFT LUNG CANCER 2005 SURGERY AND CHEMO DONE  AND CURRENTLY 4/ 14/22  RIGHT LUNG CANCER HAS ONLY RECEIVED RADIATION THUS FAR    Chronic kidney disease     stage 3    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 DENIES CP BUT DOES HAVE SOA CHRONIC ISSUE COPD/LUNG CANCER    COPD (chronic obstructive pulmonary disease)     FOLLOWED BY DR MARIAJOSE BAILEY    Coronary artery disease     DENIES CP BUT DOES GET SOA MOST OF THE TIME WITH EXERTION BUT OCC AT REST CHRONIC ISSUE COPD/LUNG CANCER    Deep vein thrombosis     Diabetes mellitus     DOES NOT CHECK BS DAILY    Disease of thyroid gland     HYPOTHYROIDISM    Essential hypertension     Gastric ulcer     GERD (gastroesophageal reflux disease)     Heart murmur     History of transfusion     NO ISSUES POST TRANSFUSION WAS MANY YEARS AGO    Hyperlipemia     Leukocytopenia     FOLLOWED BY DR MIR BAILEY    Limb swelling     Lumbago     Lumbar spinal stenosis     Lung cancer     Migraine headache     Multiple joint pain     On home oxygen therapy     4L/NC PRN    Osteopenia     PNA (pneumonia) 05/04/2024    Pseudomonas pneumonia 04/29/2024    Reflux esophagitis     Shortness of breath     Thyroid nodule     HAS ULTRASOUND YEARLY  BEING MONITORED    Vascular disease     Vitamin D deficiency      Past Surgical History:   Procedure Laterality Date    ABDOMINAL SURGERY      ANGIOPLASTY RENAL ARTERY Left 06/14/2024    stent placement    APPENDECTOMY      ARTERIOGRAM N/A 6/14/2024    Procedure: Arteriogram, right radial access, aortogram and renal arteriogram with possible intervention.;  Surgeon: Dio Miguel MD;  Location: [facility] CATH INVASIVE LOCATION;  Service: Peripheral Vascular;  Laterality: N/A;    ARTERIOVENOUS FISTULA/SHUNT SURGERY Left 05/10/2022    Procedure: Left basilic vein transposition;  Surgeon: Wan Barksdale MD;  Location: [facility] MAIN OR;  Service: Vascular;  Laterality: Left;    ARTERIOVENOUS FISTULA/SHUNT SURGERY Left 03/23/2023    Procedure: Ligation of left arm arteriovenous fistula;  Surgeon: Wan Barksdale MD;  Location: [facility] MAIN OR;  Service: Vascular;  Laterality: Left;    ARTERIOVENOUS FISTULA/SHUNT SURGERY Left 11/30/2023    Procedure: Creation of left arm arteriovenous graft;  Surgeon: Wan Barksdale MD;  Location: [facility] MAIN OR;  Service: Vascular;  Laterality: Left;    BRONCHOSCOPY N/A 04/24/2024    Procedure: BRONCHOSCOPY WITH BAL AND WASHINGS;  Surgeon: Khalif Yanez MD;  Location: [facility] ENDOSCOPY;  Service: Pulmonary;  Laterality: N/A;  MUCUS PLUGGING    CARDIAC CATHETERIZATION  1996    CARDIAC SURGERY      CARDIAC SURGERY      fluid drained from heart    CATARACT EXTRACTION, BILATERAL  2003    COLONOSCOPY  2014    ENDOSCOPY  2016    2019    ENDOSCOPY N/A 6/25/2025    Procedure: ESOPHAGOGASTRODUODENOSCOPY;  Surgeon: Tanvi Del Valle MD;  Location: [facility] ENDOSCOPY;  Service: Gastroenterology;  Laterality: N/A;  gastritis, esophagitis    FEMORAL ARTERY STENT Bilateral     HYSTERECTOMY      LEG THROMBECTOMY/EMBOLECTOMY Left 1/29/2025    Procedure: LEFT LOWER EXTREMITY ANGIOGRAM;  Surgeon: Jaden Castañeda MD;  Location: [facility] HYBRID OR;  Service: Vascular;  Laterality: Left;    LUNG BIOPSY Left  "2005    lobectomy upper lung caner    LUNG VOLUME REDUCTION      OTHER SURGICAL HISTORY      artifical joints/limbs    REPLACEMENT TOTAL KNEE Left 2016    SHUNT O GRAM N/A 1/13/2025    Procedure: dialysis shuntogram;  Surgeon: Court Valente MD;  Location: [facility] INVASIVE LOCATION;  Service: Peripheral Vascular;  Laterality: N/A;    UPPER GASTROINTESTINAL ENDOSCOPY         SLP Recommendation and Plan             Inpatient Speech Pathology Dysphagia Evaluation        PAIN SCALE: none indicated    PRECAUTIONS/CONTRAINDICATIONS:  standard, fall    SUSPECTED ABUSE/NEGLECT/EXPLOITATION:  none noted    SOCIAL/PSYCHOLOGICAL NEEDS/BARRIERS:  none noted    PAST SOCIAL HISTORY:  89 year old female, nursing home resident    PRIOR FUNCTION:  currently npo, previous diet at nursing home not known at this time    PATIENT GOALS/EXPECTATIONS:  wants some \"water\"    HISTORY:  89 year old female with diagnosis of UTI. She has history of lung cancer/chemotherapy. Reportedly patient having diarrhea and poor po for a couple days. Her mental status is not at baseline.     CURRENT DIET LEVEL:  npo    OBJECTIVE:    TEST ADMINISTERED:  clinical dysphagia evaluation    COGNITION/SAFETY AWARENESS:  not evaluated at this time-patient with inconsistent alertness    BEHAVIORAL OBSERVATIONS:  awake with verbal stimulation however not able to maintain alertness for longer than 10 minute intervals. Patient however does re-alert with continued cues.     ORAL MOTOR EXAM:  generalized decreased oral motor strength/rom, 4/5    VOICE QUALITY:  clear    REFLEX EXAM:  deferred    POSTURE:  sitting upright in bed    FEEDING/SWALLOWING FUNCTION:  assessed with ice chips, thin liquids, nectar thick, pureed.     CLINICAL OBSERVATIONS:  Single ice chips with patient allowing to melt in oral cavity. Delayed swallow completed. Nectar thick by spoon and cup. Delayed swallow completed. Thin liquid by spoon. Delayed swallow completed with throat clearing. " Thin liquid by cup with delayed weak coughing. Patient requiring moderate verbal and tactile cues to maintain safe alertness level during evaluation however not maintaining over 10 minute intervals. Pureed with patient holding in oral cavity. Delayed swallow completed. Solids not assessed at this time due to not maintaining alertness.     DYSPHAGIA CRITERIA:  risk of aspiration, swallow delay, not maintaining alertness for safe diet initiation    FUNCTIONAL ASSESSMENT INSTRUMENT: Patient currently scored a level 3 of 7 on Functional Communication Measures for swallowing indicating a 60-79% limitation in function.    ASSESSMENT/ PLAN OF CARE:  Pt presents with limitations, noted below, that impede patients ability to swallow safely and maintain nutrition. The skills of a therapist will be required to safely and effectively implement the following treatment plan to restore maximal level of function.    PROBLEMS:  1.  Risk of aspiration, swallow delay, not maintaining alertness for safe po diet   LTG 1: 30 days. Patient will increase functional communication measures for swallowing to level 5 of 7, indicating a 20-30% limitation in function.    STG 1a: 14 days. Patient will increase alertness and swallow function for tolerance of pureed solids with min to no s/s of aspiration.    STG 1b: 14 days. Patient will increase alertness and swallow function for tolerance of nectar thick liquids with min to no s/s of aspiration.    STG 1c: 14 days. Patient will increase alertness and swallow function for tolerance of thin liquids with min to no s/s of aspiration 8/10 trials.    STG 1d: 14 days. Patient/family education   TREATMENT: speech therapy for dysphagia, tolerance of trials of po for progression to safe po diet.       FREQUENCY/DURATION:  daily, 5 days a week    REHAB POTENTIAL:  Pt has good/fair rehab potential.  The following limitations may influence improvement/ length of tx  medical status.    RECOMMENDATIONS:   1.    DIET: Due to patient not maintaining consistent alertness, cannot recommend safe po diet. If safe po diet not initiated next 24 hours, patient may need alternative feeding method.       Pt/responsible party agrees with plan of care and has been informed of all alternatives, risks and benefits.                 Anticipated Discharge Disposition (SLP): anticipate therapy at next level of care (07/10/25 1329)                                                               EDUCATION  The patient has been educated in the following areas:   Dysphagia (Swallowing Impairment).                Time Calculation:    Time Calculation- SLP       Row Name 07/10/25 1329             Time Calculation- SLP    SLP Start Time 0630  -SN      SLP Stop Time 0730  -SN      SLP Time Calculation (min) 60 min  -SN      SLP Received On 07/10/25  -SN         Untimed Charges    04431-AO Eval Oral Pharyng Swallow Minutes 60  -SN         Total Minutes    Untimed Charges Total Minutes 60  -SN       Total Minutes 60  -SN                User Key  (r) = Recorded By, (t) = Taken By, (c) = Cosigned By      Initials Name Provider Type    SN Denise Crowell MS-CCC/SLP, VISH Speech and Language Pathologist                    Therapy Charges for Today       Code Description Service Date Service Provider Modifiers Qty    [id number] HC ST EVAL ORAL PHARYNG SWALLOW 4 7/10/2025 Denise Crowell MS-CCC/SLP, VISH GN 1                 KAYCEE Watson/FLY, VISH  7/10/2025

## 2025-07-10 NOTE — SIGNIFICANT NOTE
07/10/25 1000   Physical Therapy Time and Intention   Comment, Session Not Performed pt declined treatment

## 2025-07-10 NOTE — PLAN OF CARE
Goal Outcome Evaluation:  Plan of Care Reviewed With: patient        Progress: no change  Outcome Evaluation: Pt is a new admit from ED. AOx3 disoriented to situation, On 2L NC. Pt frequently asking for a pain medicine. Contacted on call MD for pain medicine through IV  since pt is NPO. Medicated pain med 2x this shift. Skin assessment performed with Barbara KHAN. Pt is agitated at times. Negative for cdiff. Wound/skin care performed this shift. No new issues at this time, Continue plan of care

## 2025-07-10 NOTE — NURSING NOTE
Skin assessment performed with Barbara KHAN. Sutures on Left leg, abrasion left lower leg, buttocks red but blanchable. 2+ edema left leg.

## 2025-07-10 NOTE — PROGRESS NOTES
"[facility] Clinical Pharmacy Services: Vancomycin Monitoring Note    Charlette Rai is a 89 y.o. female who is on day 25 (end date: ) of pharmacy to dose vancomycin for Urinary Tract Infection.    Imaging Reviewed?: Yes  Updated Cultures and Sensitivities:   Microbiology Results (last 10 days)       Procedure Component Value - Date/Time    MRSA Screen, PCR (Inpatient) - Swab, Nares [[id number]]  (Normal) Collected: 25 2245    Lab Status: Final result Specimen: Swab from Nares Updated: 07/10/25 0114     MRSA PCR No MRSA Detected    Narrative:      The negative predictive value of this diagnostic test is high and should only be used to consider de-escalating anti-MRSA therapy. A positive result may indicate colonization with MRSA and must be correlated clinically.    Clostridioides difficile Toxin - Stool, Per Rectum [[id number]] Collected: 25 172    Lab Status: Final result Specimen: Stool from Per Rectum Updated: 25    Narrative:      The following orders were created for panel order Clostridioides difficile Toxin - Stool, Per Rectum.  Procedure                               Abnormality         Status                     ---------                               -----------         ------                     Clostridioides difficile...[[id number]]                      Final result                 Please view results for these tests on the individual orders.    Clostridioides difficile Toxin, PCR - Stool, Per Rectum [[id number]] Collected: 25 172    Lab Status: Final result Specimen: Stool from Per Rectum Updated: 25     Toxigenic C. difficile by PCR Negative     027 Toxin Presumptive Negative    Narrative:      The result indicates the absence of toxigenic C. difficile from stool specimen.               Vitals/Labs  Ht: 157.5 cm (62\"); Wt: 68.2 kg (150 lb 5.7 oz)   Temp (24hrs), Av.8 °F (36.6 °C), Min:97.3 °F (36.3 °C), Max:98.1 °F (36.7 °C)   Estimated Creatinine " Clearance: 13.4 mL/min (A) (by C-G formula based on SCr of 2.63 mg/dL (H)).   Hemodialysis, schedule to be determined    Results from last 7 days   Lab Units 07/10/25  0436 07/09/25  1529   VANCOMYCIN RM mcg/mL 14.81  --    CREATININE mg/dL 2.63* 2.16*   WBC 10*3/mm3 18.10* 20.22*       Assessment/Plan  Last vancomycin dose: 1250 mg IV given on 7/9/25 at 1842    Vancomycin pulse dose 500mg iv x 1 today.  No dialysis orders yet, waiting on nephrology consult.  Will check Vanc Random ordered for AM.      Thank you for involving pharmacy in this patient's care. Please contact pharmacy with any questions or concerns.    Adina Irwin, PharmD  Clinical Pharmacist

## 2025-07-10 NOTE — PROGRESS NOTES
[facility]   Hospitalist Progress Note  Date: 7/10/2025  Patient Name: Charlette Rai  : 1935  MRN: [id number]  Date of admission: 2025    Subjective   Subjective     Chief Complaint:   Fall, weakness    Summary:   89-year-old female with a past medical history significant for peripheral arterial disease, renal artery stenosis status post stenting, hypertension, ESRD on HD, diastolic CHF, COPD, lung cancer on chemotherapy, type 2 diabetes mellitus, reportedly patient has been having significant diarrhea for several days, has not eaten for couple days, had been getting IV fluids with dialysis because of hypotension, patient was recently in the hospital from  to 7/3/2025 at that time she came in with complaint of hematemesis and worsening weakness.  Patient had EGD which showed esophagitis and gastritis.  Patient did receive a unit of blood at that time.  Patient was restarted on aspirin at discharge.  Of note on 2025 patient had a fall and acute periprosthetic femoral fracture and was sent to [facility] for repair.  In the emergency department the patient was found to be hypotensive, was 78% on room air, chest x-ray showed volume loss and haziness throughout the left hemithorax.  Of note patient does have a history of lung cancer and left upper lobe ectomy patient underwent CT scan of the head which was negative for any acute abnormalities however did have some chronic small vessel ischemic changes.  Patient was found to have UTI via UA.  Patient was admitted to the hospital for further workup and management    Interval Followup:   Patient resting comfortably in bed, patient has wide pulse pressors noted, tolerating treatment at this time    Objective   Objective     Vitals:   Temp:  [96.1 °F (35.6 °C)-98.1 °F (36.7 °C)] 98.1 °F (36.7 °C)  Heart Rate:  [52-94] 52  Resp:  [16-22] 22  BP: (129-157)/(27-70) 132/28  Flow (L/min) (Oxygen Therapy):  [2] 2    Physical Exam   GEN: No  acute distress  HEENT: Moist mucous membranes  LUNGS: Equal chest rise bilaterally  CARDIAC: Regular rate and rhythm  NEURO: Moving all 4 extremities spontaneously  SKIN: Multiple skin excoriations    Result Review    Result Review:  I have personally reviewed the results as below  [x]  Laboratory CBC, CMP personally reviewed  CBC          7/3/2025    04:45 7/9/2025    15:29 7/10/2025    04:36   CBC   WBC  20.22  18.10    RBC  2.47  2.48    Hemoglobin 7.8  7.9  8.0    Hematocrit 25.6  26.1  25.7    MCV  105.7  103.6    MCH  32.0  32.3    MCHC  30.3  31.1    RDW  20.5  19.9    Platelets  144  136      CMP          7/2/2025    04:45 7/9/2025    15:29 7/10/2025    04:36   CMP   Glucose 81  108  86    BUN 30.7  27.0  32.1    Creatinine 3.40  2.16  2.63    EGFR 12.5  21.5  17.0    Sodium 133  133  134    Potassium 3.7  3.4  3.5    Chloride 99  95  99    Calcium 8.1  8.1  7.5    Total Protein  5.1     Albumin  2.7     Globulin  2.4     Total Bilirubin  0.4     Alkaline Phosphatase  270     AST (SGOT)  113     ALT (SGPT)  32     Albumin/Globulin Ratio  1.1     BUN/Creatinine Ratio 9.0  12.5  12.2    Anion Gap 11.0  11.2  11.1      []  Microbiology  []  Radiology  []  EKG/Telemetry   []  Cardiology/Vascular   []  Pathology  []  Old records  []  Other:    Scheduled medications:  arformoterol, 15 mcg, Nebulization, BID - RT  [Held by provider] aspirin, 81 mg, Oral, Daily  budesonide, 0.5 mg, Nebulization, BID - RT  [Held by provider] carvedilol, 6.25 mg, Oral, BID With Meals  cefepime, 1,000 mg, Intravenous, Q24H  [Held by provider] cetirizine, 10 mg, Oral, Daily  [Held by provider] clopidogrel, 75 mg, Oral, Daily  fluticasone, 1 spray, Nasal, Daily  [Held by provider] gabapentin, 100 mg, Oral, BID  [Held by provider] isosorbide mononitrate, 30 mg, Oral, Nightly  [Held by provider] levothyroxine, 50 mcg, Oral, Q AM  [Held by provider] losartan, 25 mg, Oral, Q24H  pantoprazole, 40 mg, Intravenous, BID AC  [Held by provider]  rosuvastatin, 10 mg, Oral, Nightly  sodium chloride, 10 mL, Intravenous, Q12H  tobramycin PF, 300 mg, Nebulization, Q12H - RT      As needed medications:    acetaminophen **OR** acetaminophen **OR** acetaminophen    senna-docusate sodium **AND** polyethylene glycol **AND** bisacodyl **AND** bisacodyl    heparin (porcine)    hydrALAZINE    ipratropium-albuterol    Morphine    ondansetron ODT **OR** ondansetron    Pharmacy to dose vancomycin    Pharmacy To Dose:    sodium chloride    sodium chloride    sodium chloride  Infusions:  Pharmacy to dose vancomycin,   Pharmacy To Dose:,            Assessment & Plan   Assessment / Plan     Assessment:  UTI with altered mental status  Hypotension  Anemia  Diarrhea  End-stage renal disease on hemodialysis  Severe protein calorie malnutrition  Chronic diastolic heart failure not in acute exacerbation  Hypothyroidism  Chronic hypoxemia on home oxygen  Severe protein calorie malnutrition  Recent femoral fracture with repair at [facility]    Plan:  Patient admitted to the hospital for further workup and management of above  Continue broad-spectrum antibiotics as patient has recently been in the hospital  Urine culture ordered and pending  N.p.o. pending speech eval  Received gentle IV hydration, monitor for continued needs  Wide pulse pressure likely secondary to aortic stenosis as noted on recent echocardiogram  C. difficile negative  FOBT ordered and pending  Continue Protonix  Enteric panel and bacterial stool panel ordered  Nephrology consulted for dialysis needs  Nutritional consult for severe protein calorie malnutrition  Resume appropriate home medications  CBC, CMP reviewed  Repeat CBC, CMP, mag and Phos in a.m.     Reviewed patient's labs and imaging, and discussed with patient and nurse at bedside.    VTE Prophylaxis:  Pharmacologic & mechanical VTE prophylaxis orders are present.        CODE STATUS:   Code Status (Patient has no pulse and is not breathing): CPR (Attempt to  Resuscitate)  Medical Interventions (Patient has pulse or is breathing): Full Support      Electronically signed by Boom Peñaloza MD, 7/10/2025, 13:33 EDT.

## 2025-07-10 NOTE — CONSULTS
[facility]   Nephrology Consult Note      Patient Name: Charlette Rai  : 1935  MRN: [id number]  Primary Care Physician:  Brennan Mosqueda MD  Referring Physician: No ref. provider found  Date of admission: 2025    Subjective   Subjective     Reason for Consult/ Chief Complaint: ESRD with failure to thrive    HPI:  Charlette Rai is a 89 y.o. female with history of end-stage renal disease, was at [facility] receiving rehab after a fall and left hip fracture and recent admission with what sounded like pneumonia and generalized weakness.  Patient was receiving dialysis in house.  On Wednesday she was hypotensive and was given 2.5 L of IV fluid during dialysis.  On Thursday  also was hypotensive.  She has been having on and off diarrhea.  No fever.  She has declined significantly and has been very tired and lethargic.  She was hypotensive Wednesday and Thursday.  She presented to the ER last night for evaluation of generalized weakness and altered mentation.  She did receive IV fluid in the ER.  This morning she was more interactive though still quite lethargic.  Her labs reviewed.  She tested negative for C. difficile.  Her white count is elevated.    Review of Systems   Review of systems could not be obtained due to   patient confusion.  Lethargy    Personal History     Past Medical History:   Diagnosis Date    Allergic rhinitis     Anaphylactic shock, unspecified, initial encounter 2023    Anemia     Arthritis     Asthma     USE INHALERS AND NEBULIZERS    Back pain     Bladder disorder     Cancer     LEFT LUNG CANCER  SURGERY AND CHEMO DONE  AND CURRENTLY   RIGHT LUNG CANCER HAS ONLY RECEIVED RADIATION THUS FAR    Chronic kidney disease     stage 3    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  DENIES CP BUT DOES HAVE SOA CHRONIC ISSUE COPD/LUNG CANCER    COPD (chronic obstructive pulmonary disease)     FOLLOWED BY DR MARIAJOSE BAILEY    Coronary artery disease     DENIES CP BUT DOES GET SOA MOST OF THE TIME WITH EXERTION BUT OCC AT REST CHRONIC ISSUE COPD/LUNG CANCER    Deep vein thrombosis     Diabetes mellitus     DOES NOT CHECK BS DAILY    Disease of thyroid gland     HYPOTHYROIDISM    Essential hypertension     Gastric ulcer     GERD (gastroesophageal reflux disease)     Heart murmur     History of transfusion     NO ISSUES POST TRANSFUSION WAS MANY YEARS AGO    Hyperlipemia     Leukocytopenia     FOLLOWED BY DR MIR BAILEY    Limb swelling     Lumbago     Lumbar spinal stenosis     Lung cancer     Migraine headache     Multiple joint pain     On home oxygen therapy     4L/NC PRN    Osteopenia     PNA (pneumonia) 05/04/2024    Pseudomonas pneumonia 04/29/2024    Reflux esophagitis     Shortness of breath     Thyroid nodule     HAS ULTRASOUND YEARLY BEING MONITORED    Vascular disease     Vitamin D deficiency        Past Surgical History:   Procedure Laterality Date    ABDOMINAL SURGERY      ANGIOPLASTY RENAL ARTERY Left 06/14/2024    stent placement    APPENDECTOMY      ARTERIOGRAM N/A 6/14/2024    Procedure: Arteriogram, right radial access, aortogram and renal arteriogram with possible intervention.;  Surgeon: Dio Miguel MD;  Location: [facility] CATH INVASIVE LOCATION;  Service: Peripheral Vascular;  Laterality: N/A;    ARTERIOVENOUS FISTULA/SHUNT SURGERY Left 05/10/2022    Procedure: Left basilic vein transposition;  Surgeon: Wan Barksdale MD;  Location: [facility] MAIN OR;  Service: Vascular;  Laterality: Left;    ARTERIOVENOUS FISTULA/SHUNT SURGERY Left 03/23/2023    Procedure: Ligation of left arm arteriovenous fistula;  Surgeon: Wan Barksdale MD;  Location: [facility] MAIN OR;  Service: Vascular;  Laterality: Left;    ARTERIOVENOUS FISTULA/SHUNT SURGERY Left 11/30/2023    Procedure: Creation of left arm  arteriovenous graft;  Surgeon: Wan Barksdale MD;  Location: [facility] MAIN OR;  Service: Vascular;  Laterality: Left;    BRONCHOSCOPY N/A 04/24/2024    Procedure: BRONCHOSCOPY WITH BAL AND WASHINGS;  Surgeon: Khalif Yanez MD;  Location: [facility] ENDOSCOPY;  Service: Pulmonary;  Laterality: N/A;  MUCUS PLUGGING    CARDIAC CATHETERIZATION  1996    CARDIAC SURGERY      CARDIAC SURGERY      fluid drained from heart    CATARACT EXTRACTION, BILATERAL  2003    COLONOSCOPY  2014    ENDOSCOPY  2016    2019    ENDOSCOPY N/A 6/25/2025    Procedure: ESOPHAGOGASTRODUODENOSCOPY;  Surgeon: Tanvi Del Valle MD;  Location: [facility] ENDOSCOPY;  Service: Gastroenterology;  Laterality: N/A;  gastritis, esophagitis    FEMORAL ARTERY STENT Bilateral     HYSTERECTOMY      LEG THROMBECTOMY/EMBOLECTOMY Left 1/29/2025    Procedure: LEFT LOWER EXTREMITY ANGIOGRAM;  Surgeon: Jaden Castañeda MD;  Location: [facility] HYBRID OR;  Service: Vascular;  Laterality: Left;    LUNG BIOPSY Left 2005    lobectomy upper lung caner    LUNG VOLUME REDUCTION      OTHER SURGICAL HISTORY      artifical joints/limbs    REPLACEMENT TOTAL KNEE Left 2016    SHUNT O GRAM N/A 1/13/2025    Procedure: dialysis shuntogram;  Surgeon: Court Valente MD;  Location: [facility] CATH INVASIVE LOCATION;  Service: Peripheral Vascular;  Laterality: N/A;    UPPER GASTROINTESTINAL ENDOSCOPY         Family History: family history includes Arthritis in her father and mother; Cancer in her brother, brother, brother, and brother; Diabetes in her brother; Other in her brother; Prostate cancer in her brother and brother. Otherwise pertinent FHx was reviewed and not pertinent to current issue.    Social History:  reports that she quit smoking about 21 years ago. Her smoking use included cigarettes. She started smoking about 73 years ago. She has a 52.5 pack-year smoking history. She has been exposed to tobacco smoke. She has never used smokeless tobacco. She reports that she does not  currently use alcohol. She reports that she does not use drugs.    Home Medications:  Methoxy PEG-Epoetin Beta, acetaminophen, albuterol sulfate HFA, aspirin, budesonide, carvedilol, clopidogrel, dexlansoprazole, ferrous gluconate, fluticasone, furosemide, gabapentin, ipratropium-albuterol, isosorbide mononitrate, levocetirizine, levothyroxine, lidocaine-prilocaine, losartan, rosuvastatin, tobramycin PF, and vitamin D    Allergies:  No Known Allergies    Objective    Objective     Vitals:   Temp:  [97.3 °F (36.3 °C)-98.1 °F (36.7 °C)] 98.1 °F (36.7 °C)  Heart Rate:  [52-94] 52  Resp:  [16-22] 22  BP: (129-157)/(27-65) 132/28  Flow (L/min) (Oxygen Therapy):  [2] 2     Physical Exam    Constitutional: Lethargic but arousable, no distress   Eyes: sclerae anicteric, no conjunctival injection   HENT: mucous membranes moist   Neck: Supple, no thyromegaly, no lymphadenopathy, trachea midline, No JVD   Respiratory: Decreased to auscultation bilaterally, nonlabored respirations, poor efforts   Cardiovascular: RRR, no murmurs, rubs, or gallops.   Gastrointestinal: Positive bowel sounds, soft, nontender, nondistended   Musculoskeletal: Trace edema, no clubbing or cyanosis   Psychiatric: Appropriate affect, cooperative   Neurologic: Speech is mumbled, generally weak.   Skin: warm and dry, no rashes    [facility] TDC in place.    Result Review    Result Reviewed:  I have personally reviewed the results from the time of this admission to 7/10/2025 15:24 EDT and agree with these findings:  [x]  Laboratory  []  Microbiology  [x]  Radiology  []  EKG/Telemetry   []  Cardiology/Vascular   []  Pathology  [x]  Old records  []  Other:  LAB RESULTS:    LAB RESULTS:        Lab 07/10/25  0436 07/09/25  1529   SODIUM 134* 133*   POTASSIUM 3.5 3.4*   CHLORIDE 99 95*   CO2 23.9 26.8   BUN 32.1* 27.0*   CREATININE 2.63* 2.16*   GLUCOSE 86 108*   EGFR 17.0* 21.5*   ANION GAP 11.1 11.2   MAGNESIUM 1.9 1.9   PHOSPHORUS 3.4  --            Most notable  findings include: As above.    Assessment & Plan   Assessment / Plan     Brief Patient Summary:  Charlette Rai is a 89 y.o. female who has end-stage renal disease, here with generalized weakness, altered mentation, possibly UTI.    Active Hospital Problems:  Active Hospital Problems    Diagnosis     **UTI (urinary tract infection)    ESRD  Altered mental status  Generalized weakness  Failure to thrive  Anemia    Assessment and Plan:   - ESRD, dialysis through right IJ TDC.  Was at [facility] receiving daily dialysis.  She is generally weak, declining in general, lethargic today though slightly better than what was reported last night.  She has been having on and off diarrhea.  Volume status adequate now.  Blood pressure is acceptable.  Dialysis tomorrow without ultrafiltration.  Will submit culture from each port of her dialysis catheter.  Dialysis staff to do that today.  I had spoken to her daughter recently about the above.    - UTI, empirically on antibiotics.     - Recent fall and left hip fracture, status post repair.     - Severe anemia, and recent GI bleed.  Elevated ferritin.  Will continue Epogen with dialysis.  Status post recent EGD and transfusion.      - Hypertension, likely secondary to volume depletion, better now.     - Secondary hyperparathyroidism and hyperphosphatemia, being addressed with dialysis.  Low phosphorus diet.  Sevelamer on hold.     - Severe peripheral arterial disease including lower extremities and renal artery stenosis, status post PTA and stent 6/14/2024.  Was on Plavix.     - Lung cancer.  Status post radiation.     - Type 2 diabetes with complications, per primary.    Will follow, please call with any questions!    Thank you very much for this consult!    Electronically signed by Edi García MD, 7/10/2025, 15:24 EDT.  [phone]

## 2025-07-10 NOTE — SIGNIFICANT NOTE
07/10/25 1243   OTHER   Discipline occupational therapist   Rehab Time/Intention   Session Not Performed unable to evaluate, medical status change  (Nsg. hold; Patient elevated heart rate 127 while sleeping)   Therapy Assessment/Plan (PT)   Criteria for Skilled Interventions Met (PT) yes;meets criteria;skilled treatment is necessary

## 2025-07-10 NOTE — PROGRESS NOTES
"[facility] Clinical Pharmacy Services: Vancomycin Pharmacokinetic Initial Consult Note    Charlette Rai is a 89 y.o. female who is on day 1 of pharmacy to dose vancomycin for Urinary Tract Infection.    Consult Information  Consulting Provider: Maren Patterson, DO   Planned Duration of Therapy:  5 days   Was Patient Receiving Prior to Admission/Consult?: No  Loading Dose Given: 1250 mg on  at 1842  PK/PD Target: Dose by Levels  Other Antimicrobials: Cefepime    Imaging Reviewed?: No    Microbiology Data  MRSA PCR performed: Ordered; Result: Pending  Culture/Source:   Blood culture(s): In process     Vitals/Labs  Ht: 157.5 cm (62\"); Wt: 68.2 kg (150 lb 5.7 oz)  Temp (24hrs), Av.2 °F (36.2 °C), Min:96.1 °F (35.6 °C), Max:98.1 °F (36.7 °C)   Estimated Creatinine Clearance: 16.3 mL/min (A) (by C-G formula based on SCr of 2.16 mg/dL (H)).  Hemodialysis, schedule to be determined     Results from last 7 days   Lab Units 25  1529   CREATININE mg/dL 2.16*   WBC 10*3/mm3 20.22*     Assessment/Plan:    HD patient, unsure of schedule. Loading dose given today. Will order level tomorrow morning with AM labs as a pre-HD level if HD ordered for tomorrow.     Vancomycin Dose: pulse dosing with 1250 mg IV once on  at 1842;  Vanc Random ordered for 7/10 at 0600  Patient has order for Basic Metabolic Panel    Pharmacy will follow patient's kidney function and will adjust doses and obtain levels as necessary. Thank you for involving pharmacy in this patient's care. Please contact pharmacy with any questions or concerns.                           Peng Michaels, PharmD  Clinical Pharmacist   "

## 2025-07-10 NOTE — SIGNIFICANT NOTE
Wound Eval / Progress Noted    MAURICE Escamilla     Patient Name: Charlette Rai  : 1935  MRN: [id number]  Today's Date: 7/10/2025                 Admit Date: 2025    Visit Dx:    ICD-10-CM ICD-9-CM   1. Acute UTI  N39.0 599.0   2. Altered mental status, unspecified altered mental status type  R41.82 780.97         UTI (urinary tract infection)        Past Medical History:   Diagnosis Date    Allergic rhinitis     Anaphylactic shock, unspecified, initial encounter 2023    Anemia     Arthritis     Asthma     USE INHALERS AND NEBULIZERS    Back pain     Bladder disorder     Cancer     LEFT LUNG CANCER  SURGERY AND CHEMO DONE  AND CURRENTLY   RIGHT LUNG CANCER HAS ONLY RECEIVED RADIATION THUS FAR    Chronic kidney disease     stage 3    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 DENIES CP BUT DOES HAVE SOA CHRONIC ISSUE COPD/LUNG CANCER    COPD (chronic obstructive pulmonary disease)     FOLLOWED BY DR MARIAJOSE BAILEY    Coronary artery disease     DENIES CP BUT DOES GET SOA MOST OF THE TIME WITH EXERTION BUT OCC AT REST CHRONIC ISSUE COPD/LUNG CANCER    Deep vein thrombosis     Diabetes mellitus     DOES NOT CHECK BS DAILY    Disease of thyroid gland     HYPOTHYROIDISM    Essential hypertension     Gastric ulcer     GERD (gastroesophageal reflux disease)     Heart murmur     History of transfusion     NO ISSUES POST TRANSFUSION WAS MANY YEARS AGO    Hyperlipemia     Leukocytopenia     FOLLOWED BY DR MIR BAILEY    Limb swelling     Lumbago     Lumbar spinal stenosis     Lung cancer     Migraine headache     Multiple joint pain     On home oxygen therapy     4L/NC PRN    Osteopenia     PNA (pneumonia) 2024    Pseudomonas pneumonia 2024    Reflux esophagitis     Shortness of breath     Thyroid nodule     HAS ULTRASOUND YEARLY  BEING MONITORED    Vascular disease     Vitamin D deficiency         Past Surgical History:   Procedure Laterality Date    ABDOMINAL SURGERY      ANGIOPLASTY RENAL ARTERY Left 06/14/2024    stent placement    APPENDECTOMY      ARTERIOGRAM N/A 6/14/2024    Procedure: Arteriogram, right radial access, aortogram and renal arteriogram with possible intervention.;  Surgeon: Dio Miguel MD;  Location: [facility] CATH INVASIVE LOCATION;  Service: Peripheral Vascular;  Laterality: N/A;    ARTERIOVENOUS FISTULA/SHUNT SURGERY Left 05/10/2022    Procedure: Left basilic vein transposition;  Surgeon: Wan Barksdale MD;  Location: [facility] MAIN OR;  Service: Vascular;  Laterality: Left;    ARTERIOVENOUS FISTULA/SHUNT SURGERY Left 03/23/2023    Procedure: Ligation of left arm arteriovenous fistula;  Surgeon: Wan Barksdale MD;  Location: [facility] MAIN OR;  Service: Vascular;  Laterality: Left;    ARTERIOVENOUS FISTULA/SHUNT SURGERY Left 11/30/2023    Procedure: Creation of left arm arteriovenous graft;  Surgeon: Wan Barksdale MD;  Location: [facility] MAIN OR;  Service: Vascular;  Laterality: Left;    BRONCHOSCOPY N/A 04/24/2024    Procedure: BRONCHOSCOPY WITH BAL AND WASHINGS;  Surgeon: Khalif Yanez MD;  Location: [facility] ENDOSCOPY;  Service: Pulmonary;  Laterality: N/A;  MUCUS PLUGGING    CARDIAC CATHETERIZATION  1996    CARDIAC SURGERY      CARDIAC SURGERY      fluid drained from heart    CATARACT EXTRACTION, BILATERAL  2003    COLONOSCOPY  2014    ENDOSCOPY  2016    2019    ENDOSCOPY N/A 6/25/2025    Procedure: ESOPHAGOGASTRODUODENOSCOPY;  Surgeon: Tanvi Del Valle MD;  Location: [facility] ENDOSCOPY;  Service: Gastroenterology;  Laterality: N/A;  gastritis, esophagitis    FEMORAL ARTERY STENT Bilateral     HYSTERECTOMY      LEG THROMBECTOMY/EMBOLECTOMY Left 1/29/2025    Procedure: LEFT LOWER EXTREMITY ANGIOGRAM;  Surgeon: Jaden Castañeda MD;  Location: [facility] HYBRID OR;  Service: Vascular;  Laterality: Left;    LUNG BIOPSY Left  2005    lobectomy upper lung caner    LUNG VOLUME REDUCTION      OTHER SURGICAL HISTORY      artifical joints/limbs    REPLACEMENT TOTAL KNEE Left 2016    SHUNT O GRAM N/A 1/13/2025    Procedure: dialysis shuntogram;  Surgeon: Court Valente MD;  Location: [facility] INVASIVE LOCATION;  Service: Peripheral Vascular;  Laterality: N/A;    UPPER GASTROINTESTINAL ENDOSCOPY           Physical Assessment:  Wound Right posterior heel Pressure Injury (Active)   Wound Image   07/10/25 0030   Pressure Injury Stage DTPI 07/10/25 0652   Dressing Appearance open to air 07/10/25 0652   Dressing Removed Type silicone border foam 07/10/25 0652   Closure None 07/10/25 0652   Base maroon/purple;dry;nonblanchable 07/10/25 0652   Periwound dry;intact 07/10/25 0652   Periwound Temperature warm 07/10/25 0652   Periwound Skin Turgor soft 07/10/25 0652   Edges rolled/closed 07/10/25 0652   Drainage Amount none 07/10/25 0652   Care, Wound cleansed with;sterile normal saline 07/10/25 0652   Dressing Care dressing applied;petroleum-based;non-adherent;gauze;silicone border foam 07/10/25 0652   Periwound Care absorptive dressing applied 07/10/25 0652       Wound Left lower leg Traumatic Abrasion (Active)   Wound Image   07/10/25 0030   Dressing Appearance dry;intact 07/10/25 0652   Dressing Removed Type gauze, wet-to-moist;gauze, dry;silicone border foam 07/10/25 0652   Closure None 07/10/25 0652   Base dry;scab 07/10/25 0652   Periwound intact;pink 07/10/25 0652   Periwound Temperature warm 07/10/25 0652   Periwound Skin Turgor soft 07/10/25 0652   Edges rolled/closed 07/10/25 0652   Drainage Amount none 07/10/25 0652   Care, Wound cleansed with;sterile normal saline 07/10/25 0652   Dressing Care open to air 07/10/25 0652   Periwound Care absorptive dressing applied 07/10/25 0030       Wound Left anterior knee Surgical Closed Surgical Incision (Active)   Wound Image   07/10/25 0030   Dressing Appearance dry;intact 07/10/25 0652   Dressing  Removed Type petroleum-based;non-adherent;gauze;silicone border foam 07/10/25 0652   Closure Sutures 07/10/25 0652   Base clean;dry 07/10/25 0652   Periwound dry;intact 07/10/25 0652   Periwound Temperature warm 07/10/25 0652   Periwound Skin Turgor soft 07/10/25 0652   Edges rolled/closed 07/10/25 0652   Drainage Amount none 07/10/25 0652   Care, Wound cleansed with;sterile normal saline 07/10/25 0030   Dressing Care dressing reinforced 07/10/25 0652   Periwound Care absorptive dressing applied 07/10/25 0030       Wound Left anterior greater trochanter Surgical Closed Surgical Incision (Active)   Wound Image   07/10/25 0030   Dressing Appearance dry;intact 07/10/25 0652   Dressing Removed Type petroleum-based;non-adherent;gauze;silicone border foam 07/10/25 0652   Closure Sutures 07/10/25 0652   Base clean;dry 07/10/25 0652   Periwound dry;intact 07/10/25 0652   Periwound Temperature warm 07/10/25 0652   Periwound Skin Turgor soft 07/10/25 0652   Edges rolled/closed 07/10/25 0652   Drainage Amount none 07/10/25 0652   Care, Wound cleansed with;sterile normal saline 07/10/25 0030   Dressing Care dressing reinforced 07/10/25 0652   Periwound Care absorptive dressing applied 07/10/25 0030       Wound 07/10/25 0038 coccyx (Active)   Wound Image   07/10/25 0030   Dressing Appearance open to air 07/10/25 0652   Confirmed Empty Wound Bed Yes, visual inspection of wound bed 07/10/25 0652   Closure None 07/10/25 0652   Base dry;red;blanchable 07/10/25 0652   Periwound dry;intact;maroon/purple;other (see comments) 07/10/25 0652   Periwound Temperature warm 07/10/25 0652   Periwound Skin Turgor soft 07/10/25 0652   Edges rolled/closed 07/10/25 0652   Drainage Amount none 07/10/25 0652   Care, Wound cleansed with;sterile normal saline 07/10/25 0652   Dressing Care open to air 07/10/25 0652   Periwound Care barrier ointment applied 07/10/25 0030      Wound Check / Follow-up: Patient seen today for wound consult. Patient is  awake, alert and oriented at time of visit. She is agreeable to assessment.    Patient with surgical incisions to left upper thigh and left anterior knee. Sutures in place with no evidence of dehiscence noted. Patient reports she had this surgery performed at [facility] but cannot remember the date. Primary RN to reach out to family and surgeon office to see about leaving sutures in place or removal. Recommending every other day dressing changes with non-adherent petroleum based gauze and silicone border dressing for protection.    Traumatic injury to left anterior lower leg now closed with dry crusted tissue present. Cleansed with normal saline and gauze. Recommending skin care and hygiene with application of purple top moisturizer.    Small area of friction / shearing is noted to right gluteal aspect. Dry, red blanchable tissue with peeling skin surrounding is present. Intact blanchable purple discoloration is noted to bilateral gluteal aspects. Recommending skin care and skin protection with application of blue top barrier cream.    Deep tissue injury present to right heel. Intact, maroon / purple non-blanchable tissue is present. Cleansed with normal saline and gauze. Recommending every other day dressing changes with non-adherent petroleum based gauze and silicone border dressing.    Recommending to implement pressure reduction measures including every two hour turns and offloading heels at all times.      Impression: Surgical incisions to left upper thigh and left anterior knee. Dry, crusted tissue to LLE. Friction / shearing to right gluteal aspect. Purple, blanchable discoloration to bilateral gluteal aspects.      Short term goals: Regain skin integrity, skin protection, pressure reduction, every other day dressing changes, skin care.      Wendi Cuello RN    7/10/2025    12:31 EDT

## 2025-07-10 NOTE — PROGRESS NOTES
RT EQUIPMENT DEVICE RELATED - SKIN ASSESSMENT    RT Medical Equipment/Device:     NIV Mask:  Under-the-nose   size:  b    Skin Assessment:      Cheek:  Intact  Chin:  Intact  Nares:  Intact    Device Skin Pressure Protection:  Skin-to-device areas padded:  None Required    Nurse Notification:  Gladis Hoyt, CRT

## 2025-07-10 NOTE — PLAN OF CARE
Goal Outcome Evaluation:              Outcome Evaluation: Pt. more alert this shift, reconizing family at bedside. Scheduled to have HD tomororw am. IV abx continued

## 2025-07-10 NOTE — NURSING NOTE
Blood cultures from TDC ordered for patient.  This HD nurse went to floor to draw cultures, heparin locked the lumens, and changed the dressing on the TDC site.  Blood cultures sent to lab.  Verified that LAB received cultures tubes.

## 2025-07-10 NOTE — CONSULTS
Nutrition Services    Patient Name: Charlette Rai  YOB: 1935  MRN: [id number]  Admission date: 7/9/2025      CLINICAL NUTRITION ASSESSMENT      Reason for Assessment  MST Score 2+     H&P:  Past Medical History:   Diagnosis Date    Allergic rhinitis     Anaphylactic shock, unspecified, initial encounter 12/05/2023    Anemia     Arthritis     Asthma     USE INHALERS AND NEBULIZERS    Back pain     Bladder disorder     Cancer     LEFT LUNG CANCER 2005 SURGERY AND CHEMO DONE  AND CURRENTLY 4/ 14/22  RIGHT LUNG CANCER HAS ONLY RECEIVED RADIATION THUS FAR    Chronic kidney disease     stage 3    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 DENIES CP BUT DOES HAVE SOA CHRONIC ISSUE COPD/LUNG CANCER    COPD (chronic obstructive pulmonary disease)     FOLLOWED BY DR MARIAJOSE BAILEY    Coronary artery disease     DENIES CP BUT DOES GET SOA MOST OF THE TIME WITH EXERTION BUT OCC AT REST CHRONIC ISSUE COPD/LUNG CANCER    Deep vein thrombosis     Diabetes mellitus     DOES NOT CHECK BS DAILY    Disease of thyroid gland     HYPOTHYROIDISM    Essential hypertension     Gastric ulcer     GERD (gastroesophageal reflux disease)     Heart murmur     History of transfusion     NO ISSUES POST TRANSFUSION WAS MANY YEARS AGO    Hyperlipemia     Leukocytopenia     FOLLOWED BY DR MIR BAILEY    Limb swelling     Lumbago     Lumbar spinal stenosis     Lung cancer     Migraine headache     Multiple joint pain     On home oxygen therapy     4L/NC PRN    Osteopenia     PNA (pneumonia) 05/04/2024    Pseudomonas pneumonia 04/29/2024    Reflux esophagitis     Shortness of breath     Thyroid nodule     HAS ULTRASOUND YEARLY BEING MONITORED    Vascular disease     Vitamin D deficiency         Current Problems:   Active Hospital Problems    Diagnosis     **UTI (urinary tract  "infection)         Nutrition/Diet History         Narrative   Admitted for AMS. Pt is lethargic and falling asleep during interview. Pt receives dialysis. Multiple wounds noted. Recommend Novasource BID for additional protein.      Anthropometrics        Current Height, Weight Height: 157.5 cm (62\")  Weight: 68.2 kg (150 lb 5.7 oz)   Current BMI Body mass index is 27.5 kg/m².   BMI Classification Overweight   % IBW 50.1 kg   Adjusted Body Weight (ABW)    Weight Hx  Wt Readings from Last 30 Encounters:   07/09/25 1441 68.2 kg (150 lb 5.7 oz)   07/03/25 0459 61.1 kg (134 lb 11.2 oz)   07/02/25 0708 60.6 kg (133 lb 9.6 oz)   07/01/25 0556 58.7 kg (129 lb 6.6 oz)   06/30/25 0500 61.8 kg (136 lb 3.9 oz)   06/29/25 0412 61.9 kg (136 lb 7.4 oz)   06/28/25 0435 56.4 kg (124 lb 5.4 oz)   06/27/25 0536 60.6 kg (133 lb 9.6 oz)   06/25/25 0356 64.8 kg (142 lb 13.7 oz)   06/24/25 1804 62.8 kg (138 lb 7.2 oz)   06/17/25 1130 66.7 kg (147 lb)   06/06/25 1755 66.7 kg (147 lb 0.8 oz)   06/03/25 1410 66.7 kg (147 lb)   05/13/25 1308 64.8 kg (142 lb 13.7 oz)   04/22/25 1309 65.2 kg (143 lb 12.8 oz)   04/22/25 1412 64.9 kg (143 lb)   04/01/25 0954 64 kg (141 lb 3.2 oz)   03/25/25 1111 61.6 kg (135 lb 12.8 oz)   03/18/25 1152 61.7 kg (136 lb)   03/03/25 1458 63 kg (138 lb 14.2 oz)   02/28/25 1515 62.1 kg (136 lb 12.7 oz)   02/26/25 1448 66 kg (145 lb 9.8 oz)   02/25/25 1236 63.5 kg (140 lb)   02/17/25 1448 64.8 kg (142 lb 13.7 oz)   02/06/25 1112 64.8 kg (142 lb 13.7 oz)   02/04/25 1013 64.4 kg (142 lb)   02/04/25 1040 64.4 kg (142 lb)   01/27/25 0727 64.5 kg (142 lb 3.2 oz)   01/26/25 2147 66.6 kg (146 lb 13.2 oz)   01/13/25 1350 65.2 kg (143 lb 11.8 oz)   01/09/25 1321 63.5 kg (140 lb)   12/10/24 1417 63.5 kg (140 lb)   11/19/24 1235 64 kg (141 lb)   11/05/24 1209 62.6 kg (138 lb)   10/01/24 0104 61.7 kg (136 lb)   09/24/24 1349 61.7 kg (136 lb)   09/24/24 1045 61 kg (134 lb 7.7 oz)   09/17/24 1312 63 kg (139 lb)   09/15/24 2004 63.3 " kg (139 lb 8.8 oz)          Wt Change Observation 2+ edema to L leg  UBW of 135-145 lbs     Estimated/Assessed Needs  Estimated Needs based on: Current Body Weight       Energy Requirements 25-30 kcal/kg   EST Needs (kcal/day) 9718-2798 kcal       Protein Requirements 1.5-2 g/kg   EST Daily Needs (g/day) 102-136 g       Fluid Requirements 1 ml/kcal    Estimated Needs (mL/day) 8693-1584 ml     Labs/Medications         Pertinent Labs Reviewed.   Results from last 7 days   Lab Units 07/10/25  0436 07/09/25  1529   SODIUM mmol/L 134* 133*   POTASSIUM mmol/L 3.5 3.4*   CHLORIDE mmol/L 99 95*   CO2 mmol/L 23.9 26.8   BUN mg/dL 32.1* 27.0*   CREATININE mg/dL 2.63* 2.16*   CALCIUM mg/dL 7.5* 8.1*   BILIRUBIN mg/dL  --  0.4   ALK PHOS U/L  --  270*   ALT (SGPT) U/L  --  32   AST (SGOT) U/L  --  113*   GLUCOSE mg/dL 86 108*     Results from last 7 days   Lab Units 07/10/25  0436 07/09/25  1529   MAGNESIUM mg/dL 1.9 1.9   PHOSPHORUS mg/dL 3.4  --    HEMOGLOBIN g/dL 8.0* 7.9*   HEMATOCRIT % 25.7* 26.1*     COVID19   Date Value Ref Range Status   07/15/2024 Not Detected Not Detected - Ref. Range Final     Lab Results   Component Value Date    HGBA1C 4.9 04/16/2025         Pertinent Medications Reviewed.     Malnutrition Severity Assessment              Nutrition Diagnosis         Nutrition Dx Problem 1 Increased nutrient needs related to increased protein demand as evidenced by impaired skin integrity.     Nutrition Intervention           Current Nutrition Orders & Evaluation of Intake       Current PO Diet NPO Diet NPO Type: Strict NPO   Supplement Orders Placed This Encounter      Dietary Nutrition Supplements Novasource Renal (Nepro)           Nutrition Intervention/Prescription        Advance diet as feasible (Renal)   Novasource BID (475 kcal, 22 g pro)        Medical Nutrition Therapy/Nutrition Education          Learner     Readiness Patient  Acceptance     Method     Response Explanation  Verbalizes understanding      Monitor/Evaluation        Monitor Per protocol, I&O, PO intake, GI status     Nutrition Discharge Plan         Recommend to continue oral nutrition supplements on discharge.      Electronically signed by:  Simran Nguyen RD  07/10/25 14:58 EDT

## 2025-07-11 LAB
ANION GAP SERPL CALCULATED.3IONS-SCNC: 12.2 MMOL/L (ref 5–15)
ANISOCYTOSIS BLD QL: NORMAL
BASOPHILS # BLD AUTO: 0.08 10*3/MM3 (ref 0–0.2)
BASOPHILS NFR BLD AUTO: 0.6 % (ref 0–1.5)
BUN SERPL-MCNC: 39.5 MG/DL (ref 8–23)
BUN/CREAT SERPL: 11.9 (ref 7–25)
CALCIUM SPEC-SCNC: 7.6 MG/DL (ref 8.6–10.5)
CHLORIDE SERPL-SCNC: 99 MMOL/L (ref 98–107)
CO2 SERPL-SCNC: 23.8 MMOL/L (ref 22–29)
CREAT SERPL-MCNC: 3.31 MG/DL (ref 0.57–1)
DEPRECATED RDW RBC AUTO: 74.7 FL (ref 37–54)
EGFRCR SERPLBLD CKD-EPI 2021: 12.9 ML/MIN/1.73
EOSINOPHIL # BLD AUTO: 0.12 10*3/MM3 (ref 0–0.4)
EOSINOPHIL NFR BLD AUTO: 0.9 % (ref 0.3–6.2)
ERYTHROCYTE [DISTWIDTH] IN BLOOD BY AUTOMATED COUNT: 19.4 % (ref 12.3–15.4)
GLUCOSE BLDC GLUCOMTR-MCNC: 73 MG/DL (ref 70–99)
GLUCOSE BLDC GLUCOMTR-MCNC: 77 MG/DL (ref 70–99)
GLUCOSE BLDC GLUCOMTR-MCNC: 78 MG/DL (ref 70–99)
GLUCOSE BLDC GLUCOMTR-MCNC: 90 MG/DL (ref 70–99)
GLUCOSE BLDC GLUCOMTR-MCNC: 98 MG/DL (ref 70–99)
GLUCOSE SERPL-MCNC: 71 MG/DL (ref 65–99)
HCT VFR BLD AUTO: 26.3 % (ref 34–46.6)
HGB BLD-MCNC: 7.9 G/DL (ref 12–15.9)
HYPOCHROMIA BLD QL: NORMAL
IMM GRANULOCYTES # BLD AUTO: 0.13 10*3/MM3 (ref 0–0.05)
IMM GRANULOCYTES NFR BLD AUTO: 1 % (ref 0–0.5)
LYMPHOCYTES # BLD AUTO: 0.33 10*3/MM3 (ref 0.7–3.1)
LYMPHOCYTES NFR BLD AUTO: 2.5 % (ref 19.6–45.3)
MACROCYTES BLD QL SMEAR: NORMAL
MAGNESIUM SERPL-MCNC: 1.9 MG/DL (ref 1.6–2.4)
MCH RBC QN AUTO: 31.9 PG (ref 26.6–33)
MCHC RBC AUTO-ENTMCNC: 30 G/DL (ref 31.5–35.7)
MCV RBC AUTO: 106 FL (ref 79–97)
MONOCYTES # BLD AUTO: 1.42 10*3/MM3 (ref 0.1–0.9)
MONOCYTES NFR BLD AUTO: 10.8 % (ref 5–12)
NEUTROPHILS NFR BLD AUTO: 11.06 10*3/MM3 (ref 1.7–7)
NEUTROPHILS NFR BLD AUTO: 84.2 % (ref 42.7–76)
NRBC BLD AUTO-RTO: 0 /100 WBC (ref 0–0.2)
PHOSPHATE SERPL-MCNC: 4.2 MG/DL (ref 2.5–4.5)
PLATELET # BLD AUTO: 114 10*3/MM3 (ref 140–450)
PMV BLD AUTO: 9.5 FL (ref 6–12)
POTASSIUM SERPL-SCNC: 3.4 MMOL/L (ref 3.5–5.2)
RBC # BLD AUTO: 2.48 10*6/MM3 (ref 3.77–5.28)
SMALL PLATELETS BLD QL SMEAR: NORMAL
SODIUM SERPL-SCNC: 135 MMOL/L (ref 136–145)
VANCOMYCIN SERPL-MCNC: 17.13 MCG/ML (ref 5–40)
WBC MORPH BLD: NORMAL
WBC NRBC COR # BLD AUTO: 13.14 10*3/MM3 (ref 3.4–10.8)

## 2025-07-11 PROCEDURE — 85025 COMPLETE CBC W/AUTO DIFF WBC: CPT | Performed by: HOSPITALIST

## 2025-07-11 PROCEDURE — 25010000002 CEFEPIME PER 500 MG: Performed by: HOSPITALIST

## 2025-07-11 PROCEDURE — 94799 UNLISTED PULMONARY SVC/PX: CPT

## 2025-07-11 PROCEDURE — 5A1D70Z PERFORMANCE OF URINARY FILTRATION, INTERMITTENT, LESS THAN 6 HOURS PER DAY: ICD-10-PCS | Performed by: INTERNAL MEDICINE

## 2025-07-11 PROCEDURE — 80202 ASSAY OF VANCOMYCIN: CPT | Performed by: INTERNAL MEDICINE

## 2025-07-11 PROCEDURE — 82948 REAGENT STRIP/BLOOD GLUCOSE: CPT

## 2025-07-11 PROCEDURE — 83735 ASSAY OF MAGNESIUM: CPT | Performed by: HOSPITALIST

## 2025-07-11 PROCEDURE — 25010000002 HEPARIN (PORCINE) PER 1000 UNITS: Performed by: INTERNAL MEDICINE

## 2025-07-11 PROCEDURE — 99233 SBSQ HOSP IP/OBS HIGH 50: CPT | Performed by: INTERNAL MEDICINE

## 2025-07-11 PROCEDURE — 94761 N-INVAS EAR/PLS OXIMETRY MLT: CPT

## 2025-07-11 PROCEDURE — 94640 AIRWAY INHALATION TREATMENT: CPT

## 2025-07-11 PROCEDURE — 25010000002 MORPHINE PER 10 MG: Performed by: STUDENT IN AN ORGANIZED HEALTH CARE EDUCATION/TRAINING PROGRAM

## 2025-07-11 PROCEDURE — 25010000002 HYDROMORPHONE 1 MG/ML SOLUTION: Performed by: INTERNAL MEDICINE

## 2025-07-11 PROCEDURE — 36415 COLL VENOUS BLD VENIPUNCTURE: CPT | Performed by: HOSPITALIST

## 2025-07-11 PROCEDURE — 80048 BASIC METABOLIC PNL TOTAL CA: CPT | Performed by: HOSPITALIST

## 2025-07-11 PROCEDURE — 85007 BL SMEAR W/DIFF WBC COUNT: CPT | Performed by: HOSPITALIST

## 2025-07-11 PROCEDURE — 84100 ASSAY OF PHOSPHORUS: CPT | Performed by: HOSPITALIST

## 2025-07-11 PROCEDURE — 97165 OT EVAL LOW COMPLEX 30 MIN: CPT

## 2025-07-11 PROCEDURE — 82948 REAGENT STRIP/BLOOD GLUCOSE: CPT | Performed by: PHYSICIAN ASSISTANT

## 2025-07-11 PROCEDURE — 92526 ORAL FUNCTION THERAPY: CPT

## 2025-07-11 RX ORDER — OXYCODONE AND ACETAMINOPHEN 5; 325 MG/1; MG/1
1 TABLET ORAL EVERY 4 HOURS PRN
Refills: 0 | Status: DISCONTINUED | OUTPATIENT
Start: 2025-07-11 | End: 2025-07-15

## 2025-07-11 RX ADMIN — GABAPENTIN 100 MG: 100 CAPSULE ORAL at 08:16

## 2025-07-11 RX ADMIN — PANTOPRAZOLE SODIUM 40 MG: 40 INJECTION, POWDER, FOR SOLUTION INTRAVENOUS at 08:15

## 2025-07-11 RX ADMIN — PANTOPRAZOLE SODIUM 40 MG: 40 INJECTION, POWDER, FOR SOLUTION INTRAVENOUS at 17:14

## 2025-07-11 RX ADMIN — ARFORMOTEROL TARTRATE 15 MCG: 15 SOLUTION RESPIRATORY (INHALATION) at 08:50

## 2025-07-11 RX ADMIN — MORPHINE SULFATE 2 MG: 2 INJECTION, SOLUTION INTRAMUSCULAR; INTRAVENOUS at 19:04

## 2025-07-11 RX ADMIN — BUDESONIDE 0.5 MG: 0.5 SUSPENSION RESPIRATORY (INHALATION) at 20:18

## 2025-07-11 RX ADMIN — HYDROMORPHONE HYDROCHLORIDE 0.25 MG: 1 INJECTION, SOLUTION INTRAMUSCULAR; INTRAVENOUS; SUBCUTANEOUS at 20:32

## 2025-07-11 RX ADMIN — TOBRAMYCIN 300 MG: 300 SOLUTION RESPIRATORY (INHALATION) at 08:50

## 2025-07-11 RX ADMIN — CEFEPIME 1000 MG: 1 INJECTION, POWDER, FOR SOLUTION INTRAMUSCULAR; INTRAVENOUS at 17:13

## 2025-07-11 RX ADMIN — OXYCODONE HYDROCHLORIDE AND ACETAMINOPHEN 1 TABLET: 5; 325 TABLET ORAL at 20:55

## 2025-07-11 RX ADMIN — Medication 10 ML: at 20:56

## 2025-07-11 RX ADMIN — HYDROMORPHONE HYDROCHLORIDE 0.25 MG: 1 INJECTION, SOLUTION INTRAMUSCULAR; INTRAVENOUS; SUBCUTANEOUS at 08:15

## 2025-07-11 RX ADMIN — BUDESONIDE 0.5 MG: 0.5 SUSPENSION RESPIRATORY (INHALATION) at 08:50

## 2025-07-11 RX ADMIN — MORPHINE SULFATE 2 MG: 2 INJECTION, SOLUTION INTRAMUSCULAR; INTRAVENOUS at 06:17

## 2025-07-11 RX ADMIN — ARFORMOTEROL TARTRATE 15 MCG: 15 SOLUTION RESPIRATORY (INHALATION) at 20:18

## 2025-07-11 RX ADMIN — HEPARIN SODIUM 3200 UNITS: 1000 INJECTION INTRAVENOUS; SUBCUTANEOUS at 12:49

## 2025-07-11 RX ADMIN — TOBRAMYCIN 300 MG: 300 SOLUTION RESPIRATORY (INHALATION) at 20:18

## 2025-07-11 RX ADMIN — GABAPENTIN 100 MG: 100 CAPSULE ORAL at 20:55

## 2025-07-11 RX ADMIN — FLUTICASONE PROPIONATE 1 SPRAY: 50 SPRAY, METERED NASAL at 08:16

## 2025-07-11 RX ADMIN — Medication 10 ML: at 08:16

## 2025-07-11 NOTE — NURSING NOTE
Duration of Treatment 3.5 Hours  Access Site Tunneled Dialysis Catheter  Dialyzer Revaclear   mL/min  Dialysate Temperature (C) 36  BFR-As tolerated to a maximum of: 300 mL/min  Prime Dialyzer With NS to Priming Volume? Yes  Dialysate Solution Bath: K+ = 4 mEq, Ca = 2.5mEq  Bicarb 35 mEq  Na+ 140 mEq  Fluid Removal: 0    Patient tolerated treatment well. Ran 2.5hrs (per Dr García)  No UF removal today with BVP of 45  Reported sent to [facility]

## 2025-07-11 NOTE — PROGRESS NOTES
Respiratory Therapist Broncho-Pulmonary Hygiene Progress Note      Patient Name:  Charlette Rai  YOB: 1935    Charlette Rai meets the qualification for Level 1 of the Bronco-Pulmonary Hygiene Protocol. This was based on my daily patient assessment and includes review of chest x-ray results, cough ability and quality, oxygenation, secretions or risk for secretion development and patient mobility.     Broncho-Pulmonary Hygiene Assessment:    Level of Movement: Actively changing positions-requires assistance  Disoriented/Follows Commands    Breath Sounds: Diminished and/or coarse rhonchi    Cough: Ineffective, weak, frequent    Chest X-Ray: Possible signs of consolidation and/or atelectasis or clear.     Sputum Productions: None or small amount of thin or watery secretions with effective cough    History and Physical: Home use of oxygen  and Chronic condition    SpO2 to Oxygen Need: greater than 92% on room air or  less than 3L nasal canula    Current SpO2 is: 100 on 2L nasal cannula     Based on this information, I have completed the following interventions: Teach/Instruct patient on cough and deep breathe      Electronically signed by Binta Rios RRT, 07/11/25, 9:01 AM EDT.

## 2025-07-11 NOTE — THERAPY TREATMENT NOTE
Acute Care - Speech Language Pathology   Swallow Treatment Note MAURICE Escamilla     Patient Name: Charlette Rai  : 1935  MRN: [id number]  Today's Date: 2025               Admit Date: 2025    Visit Dx:     ICD-10-CM ICD-9-CM   1. Acute UTI  N39.0 599.0   2. Altered mental status, unspecified altered mental status type  R41.82 780.97   3. Oropharyngeal dysphagia  R13.12 787.22     Patient Active Problem List   Diagnosis    Malignant neoplasm of upper lobe of right lung    Rheumatoid arthritis    Asthma    Chronic heart failure with preserved ejection fraction    Essential hypertension    GERD (gastroesophageal reflux disease)    Hyperlipidemia LDL goal <70    Lumbar spinal stenosis    Migraine    Monoclonal paraproteinemia    Osteopenia    Oxygen dependent    Peripheral neuropathy    Stage 4 chronic kidney disease    Vitamin D deficiency    Carotid artery stenosis    Chronic right-sided thoracic back pain    Peripheral vascular disease of lower extremity    Ex-smoker    De Quervain's tenosynovitis    Arthritis of carpometacarpal (CMC) joint of right thumb    Steal syndrome of dialysis vascular access    Anemia of chronic renal failure, stage 4 (severe)    Aortic stenosis, moderate    Hematoma    End stage renal disease on dialysis    Coronary artery calcification seen on CT scan    Frailty syndrome in geriatric patient    COPD with lower respiratory infection    Coagulation defect, unspecified    Hyperphosphatemia    Hypothyroidism (acquired)    Idiopathic osteoarthritis    Secondary multiple arthritis    Type 2 diabetes mellitus with diabetic peripheral angiopathy without gangrene    Renal artery stenosis    S/P renal artery angioplasty    Atherosclerosis of renal artery    Abnormal serum immunoelectrophoresis    NSVT (nonsustained ventricular tachycardia)    Hypertensive heart and chronic kidney disease with heart failure and with stage 5 chronic kidney disease, or end stage renal disease    Unspecified  protein-calorie malnutrition    IFG (impaired fasting glucose)    Iron deficiency anemia    AV graft malfunction, initial encounter    AV graft malfunction    Weakness    Malignant neoplasm of lower lobe of left lung    Malignant neoplasm of lower lobe, right bronchus or lung    Severe protein-calorie malnutrition    UTI (urinary tract infection)     Past Medical History:   Diagnosis Date    Allergic rhinitis     Anaphylactic shock, unspecified, initial encounter 12/05/2023    Anemia     Arthritis     Asthma     USE INHALERS AND NEBULIZERS    Back pain     Bladder disorder     Cancer     LEFT LUNG CANCER 2005 SURGERY AND CHEMO DONE  AND CURRENTLY 4/ 14/22  RIGHT LUNG CANCER HAS ONLY RECEIVED RADIATION THUS FAR    Chronic kidney disease     stage 3    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 DENIES CP BUT DOES HAVE SOA CHRONIC ISSUE COPD/LUNG CANCER    COPD (chronic obstructive pulmonary disease)     FOLLOWED BY DR MARIAJOSE BAILEY    Coronary artery disease     DENIES CP BUT DOES GET SOA MOST OF THE TIME WITH EXERTION BUT OCC AT REST CHRONIC ISSUE COPD/LUNG CANCER    Deep vein thrombosis     Diabetes mellitus     DOES NOT CHECK BS DAILY    Disease of thyroid gland     HYPOTHYROIDISM    Essential hypertension     Gastric ulcer     GERD (gastroesophageal reflux disease)     Heart murmur     History of transfusion     NO ISSUES POST TRANSFUSION WAS MANY YEARS AGO    Hyperlipemia     Leukocytopenia     FOLLOWED BY DR MIR BAILEY    Limb swelling     Lumbago     Lumbar spinal stenosis     Lung cancer     Migraine headache     Multiple joint pain     On home oxygen therapy     4L/NC PRN    Osteopenia     PNA (pneumonia) 05/04/2024    Pseudomonas pneumonia 04/29/2024    Reflux esophagitis     Shortness of breath     Thyroid nodule     HAS ULTRASOUND YEARLY BEING  MONITORED    Vascular disease     Vitamin D deficiency      Past Surgical History:   Procedure Laterality Date    ABDOMINAL SURGERY      ANGIOPLASTY RENAL ARTERY Left 06/14/2024    stent placement    APPENDECTOMY      ARTERIOGRAM N/A 6/14/2024    Procedure: Arteriogram, right radial access, aortogram and renal arteriogram with possible intervention.;  Surgeon: Dio Miguel MD;  Location: [facility] CATH INVASIVE LOCATION;  Service: Peripheral Vascular;  Laterality: N/A;    ARTERIOVENOUS FISTULA/SHUNT SURGERY Left 05/10/2022    Procedure: Left basilic vein transposition;  Surgeon: Wan Barksdale MD;  Location: [facility] MAIN OR;  Service: Vascular;  Laterality: Left;    ARTERIOVENOUS FISTULA/SHUNT SURGERY Left 03/23/2023    Procedure: Ligation of left arm arteriovenous fistula;  Surgeon: Wan Barksdale MD;  Location: [facility] MAIN OR;  Service: Vascular;  Laterality: Left;    ARTERIOVENOUS FISTULA/SHUNT SURGERY Left 11/30/2023    Procedure: Creation of left arm arteriovenous graft;  Surgeon: Wan Barksdale MD;  Location: [facility] MAIN OR;  Service: Vascular;  Laterality: Left;    BRONCHOSCOPY N/A 04/24/2024    Procedure: BRONCHOSCOPY WITH BAL AND WASHINGS;  Surgeon: Khalif Yanez MD;  Location: [facility] ENDOSCOPY;  Service: Pulmonary;  Laterality: N/A;  MUCUS PLUGGING    CARDIAC CATHETERIZATION  1996    CARDIAC SURGERY      CARDIAC SURGERY      fluid drained from heart    CATARACT EXTRACTION, BILATERAL  2003    COLONOSCOPY  2014    ENDOSCOPY  2016    2019    ENDOSCOPY N/A 6/25/2025    Procedure: ESOPHAGOGASTRODUODENOSCOPY;  Surgeon: Tanvi Del Valle MD;  Location: [facility] ENDOSCOPY;  Service: Gastroenterology;  Laterality: N/A;  gastritis, esophagitis    FEMORAL ARTERY STENT Bilateral     HYSTERECTOMY      LEG THROMBECTOMY/EMBOLECTOMY Left 1/29/2025    Procedure: LEFT LOWER EXTREMITY ANGIOGRAM;  Surgeon: Jaden Castañeda MD;  Location: [facility] HYBRID OR;  Service: Vascular;  Laterality: Left;    LUNG BIOPSY Left 2005     lobectomy upper lung caner    LUNG VOLUME REDUCTION      OTHER SURGICAL HISTORY      artifical joints/limbs    REPLACEMENT TOTAL KNEE Left 2016    SHUNT O GRAM N/A 1/13/2025    Procedure: dialysis shuntogram;  Surgeon: Court Valente MD;  Location: [facility] INVASIVE LOCATION;  Service: Peripheral Vascular;  Laterality: N/A;    UPPER GASTROINTESTINAL ENDOSCOPY         SLP Recommendation and Plan         SPEECH PATHOLOGY DYSPHAGIA TREATMENT    Subjective/Behavioral Observations: awake, alert. Maintaining alertness throughout session.         Day/time of Treatment:7/11/25        Current Diet:npo        Treatment received:focused on dysphagia goals, trials of po for possible initiation of safe oral diet.         Results of treatment:Oral care provided prior to treatment.Thin liquids by cup and single straw sip. Swallows completed with mild delay. Consumed 120ml without overt clinical s/s of aspiration. Assisted for pureed by spoon and bites of soft solids. Swallows completed, occasional double swallow. Laryngeal sounds clear to auscultation.         Progress toward goals:progressing. Maintained alertness throughout treatment        Barriers to Achieving goals: medical status        Plan of care:/changes in plan: as long as patient able to maintain alertness, recommend begin po diet of mechanical soft solids, thin liquids.   Fully upright for all po, 30 minutes following  Assist for self feeding, small bites and sips. Meds whole in applesauce.                              Anticipated Discharge Disposition (SLP): anticipate therapy at next level of care (07/10/25 1329)                                                               EDUCATION  The patient has been educated in the following areas:   Dysphagia (Swallowing Impairment).                Time Calculation:    Time Calculation- SLP       Row Name 07/11/25 0935             Time Calculation- SLP    SLP Stop Time 0700  -SN      SLP Received On 07/11/25  -                 User Key  (r) = Recorded By, (t) = Taken By, (c) = Cosigned By      Initials Name Provider Type    SN Denise Crowell MS-CCC/SLP, VISH Speech and Language Pathologist                    Therapy Charges for Today       Code Description Service Date Service Provider Modifiers Qty    [id number]  ST EVAL ORAL PHARYNG SWALLOW 4 7/10/2025 Denise Crowell MS-CCC/SLP, VISH GN 1    [id number]  ST TREATMENT SWALLOW 4 7/11/2025 Denise Crowell MS-CCC/SLP, VISH GN 1                 KAYCEE Watson/VISH BILL  7/11/2025

## 2025-07-11 NOTE — PROGRESS NOTES
[facility]   Hospitalist Progress Note  Date: 2025  Patient Name: Charlette Rai  : 1935  MRN: [id number]  Date of admission: 2025    Subjective   Subjective     Chief Complaint:   Fall, weakness    Summary:   89-year-old female with a past medical history significant for peripheral arterial disease, renal artery stenosis status post stenting, hypertension, ESRD on HD, diastolic CHF, COPD, lung cancer on chemotherapy, type 2 diabetes mellitus, reportedly patient has been having significant diarrhea for several days, has not eaten for couple days, had been getting IV fluids with dialysis because of hypotension, patient was recently in the hospital from  to 7/3/2025 at that time she came in with complaint of hematemesis and worsening weakness.  Patient had EGD which showed esophagitis and gastritis.  Patient did receive a unit of blood at that time.  Patient was restarted on aspirin at discharge.  Of note on 2025 patient had a fall and acute periprosthetic femoral fracture and was sent to [facility] for repair.  In the emergency department the patient was found to be hypotensive, was 78% on room air, chest x-ray showed volume loss and haziness throughout the left hemithorax.  Of note patient does have a history of lung cancer and left upper lobe ectomy patient underwent CT scan of the head which was negative for any acute abnormalities however did have some chronic small vessel ischemic changes.  Patient was found to have UTI via UA.  Patient was admitted to the hospital for further workup and management    Interval Followup:   Past 24 hours patient complaining of some hip and leg pain, her mental status has greatly improved, she is tolerating treatment with antibiotics    Objective   Objective     Vitals:   Temp:  [97.5 °F (36.4 °C)-98.1 °F (36.7 °C)] 97.6 °F (36.4 °C)  Heart Rate:  [52-96] 95  Resp:  [18-22] 18  BP: (126-161)/(27-54) 141/54  Flow (L/min) (Oxygen Therapy):   [2] 2    Physical Exam   GEN: No acute distress, resting comfortably in bed  HEENT: Moist mucous membranes  LUNGS: Equal chest rise bilaterally  CARDIAC: Regular rate and rhythm  NEURO: Moving all 4 extremities spontaneously  SKIN: Multiple skin excoriations    Result Review    Result Review:  I have personally reviewed the results as below  [x]  Laboratory CBC, CMP personally reviewed  CBC          7/9/2025    15:29 7/10/2025    04:36 7/11/2025    04:25   CBC   WBC 20.22  18.10  13.14    RBC 2.47  2.48  2.48    Hemoglobin 7.9  8.0  7.9    Hematocrit 26.1  25.7  26.3    .7  103.6  106.0    MCH 32.0  32.3  31.9    MCHC 30.3  31.1  30.0    RDW 20.5  19.9  19.4    Platelets 144  136  114      CMP          7/9/2025    15:29 7/10/2025    04:36 7/11/2025    04:25   CMP   Glucose 108  86  71    BUN 27.0  32.1  39.5    Creatinine 2.16  2.63  3.31    EGFR 21.5  17.0  12.9    Sodium 133  134  135    Potassium 3.4  3.5  3.4    Chloride 95  99  99    Calcium 8.1  7.5  7.6    Total Protein 5.1      Albumin 2.7      Globulin 2.4      Total Bilirubin 0.4      Alkaline Phosphatase 270      AST (SGOT) 113      ALT (SGPT) 32      Albumin/Globulin Ratio 1.1      BUN/Creatinine Ratio 12.5  12.2  11.9    Anion Gap 11.2  11.1  12.2      []  Microbiology  []  Radiology  []  EKG/Telemetry   []  Cardiology/Vascular   []  Pathology  []  Old records  []  Other:    Scheduled medications:  arformoterol, 15 mcg, Nebulization, BID - RT  [Held by provider] aspirin, 81 mg, Oral, Daily  budesonide, 0.5 mg, Nebulization, BID - RT  [Held by provider] carvedilol, 6.25 mg, Oral, BID With Meals  cefepime, 1,000 mg, Intravenous, Q24H  [Held by provider] cetirizine, 10 mg, Oral, Daily  [Held by provider] clopidogrel, 75 mg, Oral, Daily  epoetin mita/mita-epbx, 30,000 Units, Subcutaneous, Weekly  fluticasone, 1 spray, Nasal, Daily  gabapentin, 100 mg, Oral, BID  insulin regular, 2-7 Units, Subcutaneous, Q6H  [Held by provider] isosorbide mononitrate,  30 mg, Oral, Nightly  [Held by provider] levothyroxine, 50 mcg, Oral, Q AM  [Held by provider] losartan, 25 mg, Oral, Q24H  pantoprazole, 40 mg, Intravenous, BID AC  [Held by provider] rosuvastatin, 10 mg, Oral, Nightly  sodium chloride, 10 mL, Intravenous, Q12H  tobramycin PF, 300 mg, Nebulization, Q12H - RT      As needed medications:    acetaminophen **OR** acetaminophen **OR** acetaminophen    senna-docusate sodium **AND** polyethylene glycol **AND** bisacodyl **AND** bisacodyl    dextrose    dextrose    glucagon (human recombinant)    heparin (porcine)    hydrALAZINE    HYDROmorphone    ipratropium-albuterol    Morphine    ondansetron ODT **OR** ondansetron    oxyCODONE-acetaminophen    Pharmacy to dose vancomycin    Pharmacy To Dose:    sodium chloride    sodium chloride    sodium chloride  Infusions:  Pharmacy to dose vancomycin,   Pharmacy To Dose:,            Assessment & Plan   Assessment / Plan     Assessment:  UTI with altered mental status  Hypotension  Anemia  Diarrhea  End-stage renal disease on hemodialysis  Severe protein calorie malnutrition  Chronic diastolic heart failure not in acute exacerbation  Hypothyroidism  Chronic hypoxemia on home oxygen  Severe protein calorie malnutrition  Recent femoral fracture with repair at [facility]    Plan:  Patient admitted to the hospital for further workup and management of above  Continue broad-spectrum antibiotics as patient has recently been in the hospital  Urine culture ordered and pending  Much more alert, speech eval pending for diet  Completed IV fluids  Wide pulse pressure likely secondary to aortic stenosis as noted on recent echocardiogram  C. difficile negative  FOBT ordered and pending  Continue Protonix  Enteric panel and bacterial stool panel ordered  Nephrology consulted for dialysis needs  Nutritional consult for severe protein calorie malnutrition  Resume appropriate home medications  Percocet for pain, very low-dose IV Dilaudid for breakthrough  pain  CBC, CMP reviewed 7/11/2025   Repeat CBC, CMP, mag and Phos in a.m. 7/11/2025      Reviewed patient's labs and imaging, and discussed with patient and nurse at bedside.    VTE Prophylaxis:  Pharmacologic & mechanical VTE prophylaxis orders are present.    Time spent: Time spent involving patient care including face-to-face encounter 51 minutes    CODE STATUS:   Code Status (Patient has no pulse and is not breathing): CPR (Attempt to Resuscitate)  Medical Interventions (Patient has pulse or is breathing): Full Support      Electronically signed by Boom Peñaloza MD, 7/11/2025, 10:39 EDT.

## 2025-07-11 NOTE — PLAN OF CARE
Goal Outcome Evaluation:  Plan of Care Reviewed With: patient        Progress: no change (first session for evaluation)  Outcome Evaluation: Patient presents with limitations of balance, strength and endurance/activity tolerance which are impacting ADL/transfer independence. Skilled OT services are indicated to remediate/ compensate for deficits to maximize independence and safety with functional ADL tasks.    Anticipated Discharge Disposition (OT): sub acute care setting

## 2025-07-11 NOTE — PROGRESS NOTES
RT EQUIPMENT DEVICE RELATED - SKIN ASSESSMENT    RT Medical Equipment/Device:     NIV Mask:  Under-the-nose   size:  b    Skin Assessment:      Cheek:  Intact  Ears:  Intact  Nares:  Intact  Nose:  Intact  Lips:  Intact  Mouth:  Intact    Device Skin Pressure Protection:  Positioning supports utilized    Nurse Notification:  Gladis Kline, RRT     Rituxan Pregnancy And Lactation Text: This medication is Pregnancy Category C and it isn't know if it is safe during pregnancy. It is unknown if this medication is excreted in breast milk but similar antibodies are known to be excreted.

## 2025-07-11 NOTE — PLAN OF CARE
Goal Outcome Evaluation:      Patient wears with no issues. Will continue to encourage patient to wear bipap every night.

## 2025-07-11 NOTE — PROGRESS NOTES
RT EQUIPMENT DEVICE RELATED - SKIN ASSESSMENT    RT Medical Equipment/Device:     NIV Mask:  Under-the-nose   size:  B    Skin Assessment:      Cheek:  Intact  Chin:  Intact  Nares:  Intact  Nose:  Intact    Device Skin Pressure Protection:  Positioning supports utilized    Nurse Notification:  Gladis Rios, RRT

## 2025-07-11 NOTE — PLAN OF CARE
Goal Outcome Evaluation:   Assumed care of patient at approx 2300 this shift d/t staffing redivide.  Pt a&ox4, drowsy but arouses easily. Medicated x1 for c/o pain per MAR. Hypoglycemic x1, d50 given per MAR, interventions effective.

## 2025-07-11 NOTE — THERAPY EVALUATION
Patient Name: Charlette Rai  : 1935    MRN: [id number]                              Today's Date: 2025       Admit Date: 2025    Visit Dx:     ICD-10-CM ICD-9-CM   1. Acute UTI  N39.0 599.0   2. Altered mental status, unspecified altered mental status type  R41.82 780.97   3. Oropharyngeal dysphagia  R13.12 787.22   4. Decreased activities of daily living (ADL)  Z78.9 V49.89     Patient Active Problem List   Diagnosis    Malignant neoplasm of upper lobe of right lung    Rheumatoid arthritis    Asthma    Chronic heart failure with preserved ejection fraction    Essential hypertension    GERD (gastroesophageal reflux disease)    Hyperlipidemia LDL goal <70    Lumbar spinal stenosis    Migraine    Monoclonal paraproteinemia    Osteopenia    Oxygen dependent    Peripheral neuropathy    Stage 4 chronic kidney disease    Vitamin D deficiency    Carotid artery stenosis    Chronic right-sided thoracic back pain    Peripheral vascular disease of lower extremity    Ex-smoker    De Quervain's tenosynovitis    Arthritis of carpometacarpal (CMC) joint of right thumb    Steal syndrome of dialysis vascular access    Anemia of chronic renal failure, stage 4 (severe)    Aortic stenosis, moderate    Hematoma    End stage renal disease on dialysis    Coronary artery calcification seen on CT scan    Frailty syndrome in geriatric patient    COPD with lower respiratory infection    Coagulation defect, unspecified    Hyperphosphatemia    Hypothyroidism (acquired)    Idiopathic osteoarthritis    Secondary multiple arthritis    Type 2 diabetes mellitus with diabetic peripheral angiopathy without gangrene    Renal artery stenosis    S/P renal artery angioplasty    Atherosclerosis of renal artery    Abnormal serum immunoelectrophoresis    NSVT (nonsustained ventricular tachycardia)    Hypertensive heart and chronic kidney disease with heart failure and with stage 5 chronic kidney disease, or end stage renal disease     Unspecified protein-calorie malnutrition    IFG (impaired fasting glucose)    Iron deficiency anemia    AV graft malfunction, initial encounter    AV graft malfunction    Weakness    Malignant neoplasm of lower lobe of left lung    Malignant neoplasm of lower lobe, right bronchus or lung    Severe protein-calorie malnutrition    UTI (urinary tract infection)     Past Medical History:   Diagnosis Date    Allergic rhinitis     Anaphylactic shock, unspecified, initial encounter 12/05/2023    Anemia     Arthritis     Asthma     USE INHALERS AND NEBULIZERS    Back pain     Bladder disorder     Cancer     LEFT LUNG CANCER 2005 SURGERY AND CHEMO DONE  AND CURRENTLY 4/ 14/22  RIGHT LUNG CANCER HAS ONLY RECEIVED RADIATION THUS FAR    Chronic kidney disease     stage 3    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 DENIES CP BUT DOES HAVE SOA CHRONIC ISSUE COPD/LUNG CANCER    COPD (chronic obstructive pulmonary disease)     FOLLOWED BY DR MARIAJOSE BAILEY    Coronary artery disease     DENIES CP BUT DOES GET SOA MOST OF THE TIME WITH EXERTION BUT OCC AT REST CHRONIC ISSUE COPD/LUNG CANCER    Deep vein thrombosis     Diabetes mellitus     DOES NOT CHECK BS DAILY    Disease of thyroid gland     HYPOTHYROIDISM    Essential hypertension     Gastric ulcer     GERD (gastroesophageal reflux disease)     Heart murmur     History of transfusion     NO ISSUES POST TRANSFUSION WAS MANY YEARS AGO    Hyperlipemia     Leukocytopenia     FOLLOWED BY DR MIR BAILEY    Limb swelling     Lumbago     Lumbar spinal stenosis     Lung cancer     Migraine headache     Multiple joint pain     On home oxygen therapy     4L/NC PRN    Osteopenia     PNA (pneumonia) 05/04/2024    Pseudomonas pneumonia 04/29/2024    Reflux esophagitis     Shortness of breath     Thyroid nodule     HAS ULTRASOUND YEARLY  BEING MONITORED    Vascular disease     Vitamin D deficiency      Past Surgical History:   Procedure Laterality Date    ABDOMINAL SURGERY      ANGIOPLASTY RENAL ARTERY Left 06/14/2024    stent placement    APPENDECTOMY      ARTERIOGRAM N/A 6/14/2024    Procedure: Arteriogram, right radial access, aortogram and renal arteriogram with possible intervention.;  Surgeon: Dio Miguel MD;  Location: [facility] CATH INVASIVE LOCATION;  Service: Peripheral Vascular;  Laterality: N/A;    ARTERIOVENOUS FISTULA/SHUNT SURGERY Left 05/10/2022    Procedure: Left basilic vein transposition;  Surgeon: Wan Barksdale MD;  Location: [facility] MAIN OR;  Service: Vascular;  Laterality: Left;    ARTERIOVENOUS FISTULA/SHUNT SURGERY Left 03/23/2023    Procedure: Ligation of left arm arteriovenous fistula;  Surgeon: Wan Barksdale MD;  Location: [facility] MAIN OR;  Service: Vascular;  Laterality: Left;    ARTERIOVENOUS FISTULA/SHUNT SURGERY Left 11/30/2023    Procedure: Creation of left arm arteriovenous graft;  Surgeon: Wan Barksdale MD;  Location: [facility] MAIN OR;  Service: Vascular;  Laterality: Left;    BRONCHOSCOPY N/A 04/24/2024    Procedure: BRONCHOSCOPY WITH BAL AND WASHINGS;  Surgeon: Khalif Yanez MD;  Location: [facility] ENDOSCOPY;  Service: Pulmonary;  Laterality: N/A;  MUCUS PLUGGING    CARDIAC CATHETERIZATION  1996    CARDIAC SURGERY      CARDIAC SURGERY      fluid drained from heart    CATARACT EXTRACTION, BILATERAL  2003    COLONOSCOPY  2014    ENDOSCOPY  2016    2019    ENDOSCOPY N/A 6/25/2025    Procedure: ESOPHAGOGASTRODUODENOSCOPY;  Surgeon: Tanvi Del Valle MD;  Location: [facility] ENDOSCOPY;  Service: Gastroenterology;  Laterality: N/A;  gastritis, esophagitis    FEMORAL ARTERY STENT Bilateral     HYSTERECTOMY      LEG THROMBECTOMY/EMBOLECTOMY Left 1/29/2025    Procedure: LEFT LOWER EXTREMITY ANGIOGRAM;  Surgeon: Jaden Castañeda MD;  Location: [facility] HYBRID OR;  Service: Vascular;  Laterality: Left;    LUNG BIOPSY Left  2005    lobectomy upper lung caner    LUNG VOLUME REDUCTION      OTHER SURGICAL HISTORY      artifical joints/limbs    REPLACEMENT TOTAL KNEE Left 2016    SHUNT O GRAM N/A 1/13/2025    Procedure: dialysis shuntogram;  Surgeon: Court Valente MD;  Location: [facility] INVASIVE LOCATION;  Service: Peripheral Vascular;  Laterality: N/A;    UPPER GASTROINTESTINAL ENDOSCOPY        General Information       Row Name 07/11/25 1020          OT Time and Intention    Document Type evaluation  -AV     Mode of Treatment individual therapy;occupational therapy  -AV       Row Name 07/11/25 1020          General Information    Patient Profile Reviewed yes  -AV     Prior Level of Function independent:;ADL's;transfer  independent at baseline. ambulates with RW. 3.5L O2. has required assist for ADL/transfers since left hip fracture/ repair on 6/7/25.  -AV     Existing Precautions/Restrictions fall;NPO  WBAT LLE  -AV     Barriers to Rehab none identified  -AV       Row Name 07/11/25 1020          Occupational Profile    Reason for Services/Referral (Occupational Profile) Patient is an 89 year old female admitted to [facility] on 7/9/25 from sub-acute rehab with fall. She is currently on 4NT/ 2L O2 with sats 99%.OT consulted due to  impaired ADL/transfer independence. No previous OT services for current condition.  -AV       Row Name 07/11/25 1020          Living Environment    People in Home spouse  -AV       Row Name 07/11/25 1020          Home Main Entrance    Number of Stairs, Main Entrance three  -AV       Row Name 07/11/25 1020          Cognition    Orientation Status (Cognition) --  alert, pleasant and cooperative. able to follow commands. impaired attention to task with cues to redirect.  -AV       Row Name 07/11/25 1020          Safety Issues/Impairments Affecting Functional Mobility    Impairments Affecting Function (Mobility) balance;endurance/activity tolerance;strength  -AV               User Key  (r) =  "Recorded By, (t) = Taken By, (c) = Cosigned By      Initials Name Provider Type    Michael Carreon OT Occupational Therapist                     Mobility/ADL's       Row Name 07/11/25 1025          Transfers    Comment, (Transfers) adamantly declined all transfers: \"absolutely not\"  -AV       Row Name 07/11/25 1025          Activities of Daily Living    BADL Assessment/Intervention --  Max-dependent ADLs. NPO. purewick catheter in place.  -AV               User Key  (r) = Recorded By, (t) = Taken By, (c) = Cosigned By      Initials Name Provider Type    Michael Carreon OT Occupational Therapist                   Obj/Interventions    No documentation.                  Goals/Plan       Row Name 07/11/25 1030          Transfer Goal 1 (OT)    Activity/Assistive Device (Transfer Goal 1, OT) transfers, all;walker, rolling  -AV     [place] Level/Cues Needed (Transfer Goal 1, OT) supervision required;verbal cues required  -AV     Time Frame (Transfer Goal 1, OT) long term goal (LTG);10 days  -AV       Row Name 07/11/25 1030          Bathing Goal 1 (OT)    Activity/Device (Bathing Goal 1, OT) bathing skills, all  -AV     [place] Level/Cues Needed (Bathing Goal 1, OT) supervision required;set-up required;verbal cues required  -AV     Time Frame (Bathing Goal 1, OT) long term goal (LTG);10 days  -AV       Row Name 07/11/25 1030          Dressing Goal 1 (OT)    Activity/Device (Dressing Goal 1, OT) dressing skills, all  -AV     [place]/Cues Needed (Dressing Goal 1, OT) supervision required;set-up required;verbal cues required  -AV     Time Frame (Dressing Goal 1, OT) long term goal (LTG);10 days  -AV       Row Name 07/11/25 1030          Toileting Goal 1 (OT)    Activity/Device (Toileting Goal 1, OT) toileting skills, all;raised toilet seat  -AV     [place] Level/Cues Needed (Toileting Goal 1, OT) supervision required;set-up required;verbal cues required  -AV     Time Frame (Toileting Goal 1, OT) long " term goal (LTG);10 days  -AV       Row Name 07/11/25 1030          Grooming Goal 1 (OT)    Activity/Device (Grooming Goal 1, OT) grooming skills, all  -AV     [place] (Grooming Goal 1, OT) supervision required;set-up required;verbal cues required  -AV     Time Frame (Grooming Goal 1, OT) long term goal (LTG);10 days  -AV       Row Name 07/11/25 1030          Strength Goal 1 (OT)    Strength Goal 1 (OT) Patient will demonstrate 4/5 bilateral biceps, triceps and  to increase ADL/transfer independence.  -AV     Time Frame (Strength Goal 1, OT) long term goal (LTG);10 days  -AV       Row Name 07/11/25 1030          Problem Specific Goal 1 (OT)    Problem Specific Goal 1 (OT) Patient will demonstrate fair endurance/activity tolerance needed to support ADLs.  -AV     Time Frame (Problem Specific Goal 1, OT) long term goal (LTG);10 days  -AV       Row Name 07/11/25 1030          Therapy Assessment/Plan (OT)    Planned Therapy Interventions (OT) activity tolerance training;BADL retraining;functional balance retraining;occupation/activity based interventions;patient/caregiver education/training;ROM/therapeutic exercise;strengthening exercise;transfer/mobility retraining  -AV               User Key  (r) = Recorded By, (t) = Taken By, (c) = Cosigned By      Initials Name Provider Type    AV Michael Rodríguez, OT Occupational Therapist                   Clinical Impression       Row Name 07/11/25 1029          Pain Assessment    Additional Documentation Pain Scale: FACES Pre/Post-Treatment (Group)  -AV       [facility] Name 07/11/25 1029          Pain Scale: FACES Pre/Post-Treatment    Pain: FACES Scale, Pretreatment 2-->hurts little bit  -AV     Posttreatment Pain Rating 2-->hurts little bit  -AV       Row Name 07/11/25 1029          Plan of Care Review    Plan of Care Reviewed With patient  -AV     Progress no change  first session for evaluation  -AV     Outcome Evaluation Patient presents with limitations of balance,  strength and endurance/activity tolerance which are impacting ADL/transfer independence. Skilled OT services are indicated to remediate/ compensate for deficits to maximize independence and safety with functional ADL tasks.  -AV       Row Name 07/11/25 1029          Therapy Assessment/Plan (OT)    Patient/Family Therapy Goal Statement (OT) none stated  -AV     Rehab Potential (OT) fair  -AV     Criteria for Skilled Therapeutic Interventions Met (OT) yes;meets criteria;skilled treatment is necessary  -AV     Therapy Frequency (OT) 5 times/wk  -AV       Row Name 07/11/25 1029          Therapy Plan Review/Discharge Plan (OT)    Anticipated Discharge Disposition (OT) sub acute care setting  -AV       Row Name 07/11/25 1029          Vital Signs    O2 Delivery Pre Treatment nasal cannula  -AV     O2 Delivery Intra Treatment nasal cannula  -AV     O2 Delivery Post Treatment nasal cannula  -AV       Row Name 07/11/25 1029          Positioning and Restraints    Pre-Treatment Position in bed  -AV     Post Treatment Position bed  -AV               User Key  (r) = Recorded By, (t) = Taken By, (c) = Cosigned By      Initials Name Provider Type    AV Michael Rodríguez, MARCELLE Occupational Therapist                   Outcome Measures       Row Name 07/11/25 1032          How much help from another is currently needed...    Putting on and taking off regular lower body clothing? 1  -AV     Bathing (including washing, rinsing, and drying) 2  -AV     Toileting (which includes using toilet bed pan or urinal) 1  -AV     Putting on and taking off regular upper body clothing 2  -AV     Taking care of personal grooming (such as brushing teeth) 2  -AV     Eating meals 1  NPO  -AV     AM-PAC 6 Clicks Score (OT) 9  -AV       Row Name 07/11/25 1032          Functional Assessment    Outcome Measure Options AM-PAC 6 Clicks Daily Activity (OT);Optimal Instrument  -AV       Row Name 07/11/25 1032          Optimal Instrument    Optimal Instrument Optimal  - 3  -AV     Bending/Stooping 5  -AV     Standing 5  -AV     Reaching 2  -AV     From the list, choose the 3 activities you would most like to be able to do without any difficulty Bending/stooping;Standing;Reaching  -AV     Total Score Optimal - 3 12  -AV               User Key  (r) = Recorded By, (t) = Taken By, (c) = Cosigned By      Initials Name Provider Type    AV Michael Rodríguez OT Occupational Therapist                    Occupational Therapy Education       Title: PT OT SLP Therapies (Done)       Topic: Occupational Therapy (Done)       Point: ADL training (Done)       Learning Progress Summary            Patient Acceptance, E, VU by AV at 7/11/2025 1032                      Point: Home exercise program (Done)       Learning Progress Summary            Patient Acceptance, E, VU by AV at 7/11/2025 1032                      Point: Precautions (Done)       Learning Progress Summary            Patient Acceptance, E, VU by AV at 7/11/2025 1032                      Point: Body mechanics (Done)       Learning Progress Summary            Patient Acceptance, E, VU by AV at 7/11/2025 1032                                      User Key       Initials Effective Dates Name Provider Type Discipline     06/16/21 -  Michael Rodríguez OT Occupational Therapist OT                  OT Recommendation and Plan  Planned Therapy Interventions (OT): activity tolerance training, BADL retraining, functional balance retraining, occupation/activity based interventions, patient/caregiver education/training, ROM/therapeutic exercise, strengthening exercise, transfer/mobility retraining  Therapy Frequency (OT): 5 times/wk  Plan of Care Review  Plan of Care Reviewed With: patient  Progress: no change (first session for evaluation)  Outcome Evaluation: Patient presents with limitations of balance, strength and endurance/activity tolerance which are impacting ADL/transfer independence. Skilled OT services are indicated to remediate/  compensate for deficits to maximize independence and safety with functional ADL tasks.     Time Calculation:   Evaluation Complexity (OT)  Review Occupational Profile/Medical/Therapy History Complexity: expanded/moderate complexity  Assessment, Occupational Performance/Identification of Deficit Complexity: 1-3 performance deficits  Clinical Decision Making Complexity (OT): problem focused assessment/low complexity  Overall Complexity of Evaluation (OT): low complexity     Time Calculation- OT       Row Name 07/11/25 1034             Time Calculation- OT    OT Received On 07/11/25  -AV      OT Goal Re-Cert Due Date 07/20/25  -AV         Untimed Charges    OT Eval/Re-eval Minutes 35  -AV         Total Minutes    Untimed Charges Total Minutes 35  -AV       Total Minutes 35  -AV                User Key  (r) = Recorded By, (t) = Taken By, (c) = Cosigned By      Initials Name Provider Type    AV Michael Rodríguez OT Occupational Therapist                  Therapy Charges for Today       Code Description Service Date Service Provider Modifiers Qty    [id number] HC OT EVAL LOW COMPLEXITY 3 7/11/2025 Michael Rodríguez OT GO 1                 Michael Rodríguez OT  7/11/2025

## 2025-07-11 NOTE — PROGRESS NOTES
[facility]     Nephrology Progress Note      Patient Name: Charlette Rai  : 1935  MRN: [id number]  Primary Care Physician:  Brennan Mosqueda MD  Date of admission: 2025    Subjective   Subjective     Interval History:  Patient Reports feeling a little better.  She appears better this morning, more interactive.  Receiving breathing treatment now.  No nausea or vomiting.  Denied chest pain.    Review of Systems   All systems were reviewed and negative except for: What is mentioned above.    Objective   Objective     Vitals:   Temp:  [97.5 °F (36.4 °C)-98.1 °F (36.7 °C)] 97.6 °F (36.4 °C)  Heart Rate:  [71-96] 80  Resp:  [16-20] 18  BP: (126-163)/(32-60) 157/49  Flow (L/min) (Oxygen Therapy):  [2] 2  Physical Exam:   Constitutional: Awake, alert, tired looking, pale.   Eyes: sclerae anicteric, no conjunctival injection   HENT: mucous membranes moist   Neck: Supple, no thyromegaly, no lymphadenopathy, trachea midline, No JVD, full EJ's   Respiratory: Decreased to auscultation bilaterally, nonlabored respirations    Cardiovascular: RRR, no murmurs, rubs, or gallops.   Gastrointestinal: Positive bowel sounds, soft, nontender, nondistended   Musculoskeletal: Trace edema, no clubbing or cyanosis   Psychiatric: Appropriate affect, cooperative   Neurologic: Oriented x 3, moving all extremities, Cranial Nerves grossly intact, speech clear   Skin: warm and dry, no rashes    Right IJ TDC in place.    Result Review    Result Reviewed:  I have personally reviewed the results from the time of this admission to 2025 14:33 EDT and agree with these findings:  [x]  Laboratory  []  Microbiology  [x]  Radiology  []  EKG/Telemetry   []  Cardiology/Vascular   []  Pathology  [x]  Old records  []  Other:        Lab 25  0425 07/10/25  0436 25  1529   SODIUM 135* 134* 133*   POTASSIUM 3.4* 3.5 3.4*   CHLORIDE 99 99 95*   CO2 23.8 23.9 26.8   BUN 39.5* 32.1* 27.0*   CREATININE 3.31* 2.63* 2.16*    GLUCOSE 71 86 108*   EGFR 12.9* 17.0* 21.5*   ANION GAP 12.2 11.1 11.2   MAGNESIUM 1.9 1.9 1.9   PHOSPHORUS 4.2 3.4  --            Most notable findings include: As above.    Assessment & Plan   Assessment / Plan       Active Hospital Problems:  Active Hospital Problems    Diagnosis     **UTI (urinary tract infection)        Assessment and Plan:    - ESRD, dialysis through right IJ TDC.  Was at [facility] receiving daily dialysis.  Clinically more stable.  She still frail and weak.  Plan dialysis 2.5 hours today, no ultrafiltration.  Status post IV fluid.  Diarrhea may be better.  Volume status adequate now.  Blood pressure is acceptable.  Culture from dialysis catheter submitted 7/10/2025, no growth so far.   I had spoken to her daughter recently.     - UTI, empirically on antibiotics.     - Recent fall and left hip fracture, status post repair.     - Severe anemia, and recent GI bleed.  Elevated ferritin.  Will continue Epogen with dialysis.  Status post recent EGD and transfusion.      - Hypotension, likely secondary to volume depletion, better now.     - Secondary hyperparathyroidism and hyperphosphatemia, being addressed with dialysis.  Low phosphorus diet.  Sevelamer on hold.     - Severe peripheral arterial disease including lower extremities and renal artery stenosis, status post PTA and stent 6/14/2024.  Was on Plavix.     - Lung cancer.  Status post radiation.     - Type 2 diabetes with complications, per primary.    Will follow, please call with any questions!    Electronically signed by Edi García MD, 7/11/2025, 14:33 EDT.    [phone]

## 2025-07-12 LAB
ALBUMIN SERPL-MCNC: 2.2 G/DL (ref 3.5–5.2)
ALBUMIN/GLOB SERPL: 0.9 G/DL
ALP SERPL-CCNC: 233 U/L (ref 39–117)
ALT SERPL W P-5'-P-CCNC: 46 U/L (ref 1–33)
ANION GAP SERPL CALCULATED.3IONS-SCNC: 9.4 MMOL/L (ref 5–15)
AST SERPL-CCNC: 120 U/L (ref 1–32)
BASOPHILS # BLD AUTO: 0.07 10*3/MM3 (ref 0–0.2)
BASOPHILS NFR BLD AUTO: 0.7 % (ref 0–1.5)
BILIRUB SERPL-MCNC: 0.3 MG/DL (ref 0–1.2)
BUN SERPL-MCNC: 26.6 MG/DL (ref 8–23)
BUN/CREAT SERPL: 11.6 (ref 7–25)
CALCIUM SPEC-SCNC: 7.6 MG/DL (ref 8.6–10.5)
CHLORIDE SERPL-SCNC: 102 MMOL/L (ref 98–107)
CO2 SERPL-SCNC: 23.6 MMOL/L (ref 22–29)
CREAT SERPL-MCNC: 2.29 MG/DL (ref 0.57–1)
DEPRECATED RDW RBC AUTO: 76.7 FL (ref 37–54)
EGFRCR SERPLBLD CKD-EPI 2021: 20.1 ML/MIN/1.73
EOSINOPHIL # BLD AUTO: 0.13 10*3/MM3 (ref 0–0.4)
EOSINOPHIL NFR BLD AUTO: 1.2 % (ref 0.3–6.2)
ERYTHROCYTE [DISTWIDTH] IN BLOOD BY AUTOMATED COUNT: 19.4 % (ref 12.3–15.4)
GLOBULIN UR ELPH-MCNC: 2.5 GM/DL
GLUCOSE BLDC GLUCOMTR-MCNC: 107 MG/DL (ref 70–99)
GLUCOSE BLDC GLUCOMTR-MCNC: 110 MG/DL (ref 70–99)
GLUCOSE BLDC GLUCOMTR-MCNC: 111 MG/DL (ref 70–99)
GLUCOSE BLDC GLUCOMTR-MCNC: 114 MG/DL (ref 70–99)
GLUCOSE BLDC GLUCOMTR-MCNC: 87 MG/DL (ref 70–99)
GLUCOSE SERPL-MCNC: 103 MG/DL (ref 65–99)
HCT VFR BLD AUTO: 25.7 % (ref 34–46.6)
HGB BLD-MCNC: 7.5 G/DL (ref 12–15.9)
IMM GRANULOCYTES # BLD AUTO: 0.09 10*3/MM3 (ref 0–0.05)
IMM GRANULOCYTES NFR BLD AUTO: 0.8 % (ref 0–0.5)
LYMPHOCYTES # BLD AUTO: 0.34 10*3/MM3 (ref 0.7–3.1)
LYMPHOCYTES NFR BLD AUTO: 3.2 % (ref 19.6–45.3)
MAGNESIUM SERPL-MCNC: 1.8 MG/DL (ref 1.6–2.4)
MCH RBC QN AUTO: 31.3 PG (ref 26.6–33)
MCHC RBC AUTO-ENTMCNC: 29.2 G/DL (ref 31.5–35.7)
MCV RBC AUTO: 107.1 FL (ref 79–97)
MONOCYTES # BLD AUTO: 1.35 10*3/MM3 (ref 0.1–0.9)
MONOCYTES NFR BLD AUTO: 12.7 % (ref 5–12)
NEUTROPHILS NFR BLD AUTO: 8.62 10*3/MM3 (ref 1.7–7)
NEUTROPHILS NFR BLD AUTO: 81.4 % (ref 42.7–76)
NRBC BLD AUTO-RTO: 0 /100 WBC (ref 0–0.2)
PHOSPHATE SERPL-MCNC: 2.9 MG/DL (ref 2.5–4.5)
PLATELET # BLD AUTO: 102 10*3/MM3 (ref 140–450)
PMV BLD AUTO: 9.8 FL (ref 6–12)
POTASSIUM SERPL-SCNC: 3.8 MMOL/L (ref 3.5–5.2)
PROT SERPL-MCNC: 4.7 G/DL (ref 6–8.5)
RBC # BLD AUTO: 2.4 10*6/MM3 (ref 3.77–5.28)
SODIUM SERPL-SCNC: 135 MMOL/L (ref 136–145)
WBC NRBC COR # BLD AUTO: 10.6 10*3/MM3 (ref 3.4–10.8)

## 2025-07-12 PROCEDURE — 94664 DEMO&/EVAL PT USE INHALER: CPT

## 2025-07-12 PROCEDURE — 82948 REAGENT STRIP/BLOOD GLUCOSE: CPT

## 2025-07-12 PROCEDURE — 94799 UNLISTED PULMONARY SVC/PX: CPT

## 2025-07-12 PROCEDURE — 25010000002 HYDROMORPHONE 1 MG/ML SOLUTION: Performed by: INTERNAL MEDICINE

## 2025-07-12 PROCEDURE — 25010000002 MORPHINE PER 10 MG: Performed by: STUDENT IN AN ORGANIZED HEALTH CARE EDUCATION/TRAINING PROGRAM

## 2025-07-12 PROCEDURE — 84100 ASSAY OF PHOSPHORUS: CPT | Performed by: INTERNAL MEDICINE

## 2025-07-12 PROCEDURE — 94660 CPAP INITIATION&MGMT: CPT

## 2025-07-12 PROCEDURE — 85025 COMPLETE CBC W/AUTO DIFF WBC: CPT | Performed by: HOSPITALIST

## 2025-07-12 PROCEDURE — 80053 COMPREHEN METABOLIC PANEL: CPT | Performed by: INTERNAL MEDICINE

## 2025-07-12 PROCEDURE — 94761 N-INVAS EAR/PLS OXIMETRY MLT: CPT

## 2025-07-12 PROCEDURE — 83735 ASSAY OF MAGNESIUM: CPT | Performed by: INTERNAL MEDICINE

## 2025-07-12 PROCEDURE — 25010000002 CEFEPIME PER 500 MG: Performed by: HOSPITALIST

## 2025-07-12 PROCEDURE — 82948 REAGENT STRIP/BLOOD GLUCOSE: CPT | Performed by: PHYSICIAN ASSISTANT

## 2025-07-12 PROCEDURE — 99233 SBSQ HOSP IP/OBS HIGH 50: CPT | Performed by: INTERNAL MEDICINE

## 2025-07-12 RX ORDER — PANTOPRAZOLE SODIUM 40 MG/1
40 TABLET, DELAYED RELEASE ORAL
Status: DISCONTINUED | OUTPATIENT
Start: 2025-07-12 | End: 2025-07-18 | Stop reason: HOSPADM

## 2025-07-12 RX ADMIN — OXYCODONE HYDROCHLORIDE AND ACETAMINOPHEN 1 TABLET: 5; 325 TABLET ORAL at 20:34

## 2025-07-12 RX ADMIN — ARFORMOTEROL TARTRATE 15 MCG: 15 SOLUTION RESPIRATORY (INHALATION) at 20:33

## 2025-07-12 RX ADMIN — GABAPENTIN 100 MG: 100 CAPSULE ORAL at 09:45

## 2025-07-12 RX ADMIN — BUDESONIDE 0.5 MG: 0.5 SUSPENSION RESPIRATORY (INHALATION) at 20:33

## 2025-07-12 RX ADMIN — GABAPENTIN 100 MG: 100 CAPSULE ORAL at 20:34

## 2025-07-12 RX ADMIN — HYDROMORPHONE HYDROCHLORIDE 0.25 MG: 1 INJECTION, SOLUTION INTRAMUSCULAR; INTRAVENOUS; SUBCUTANEOUS at 02:53

## 2025-07-12 RX ADMIN — MORPHINE SULFATE 2 MG: 2 INJECTION, SOLUTION INTRAMUSCULAR; INTRAVENOUS at 09:51

## 2025-07-12 RX ADMIN — Medication 10 ML: at 09:47

## 2025-07-12 RX ADMIN — CEFEPIME 1000 MG: 1 INJECTION, POWDER, FOR SOLUTION INTRAMUSCULAR; INTRAVENOUS at 18:22

## 2025-07-12 RX ADMIN — PANTOPRAZOLE SODIUM 40 MG: 40 INJECTION, POWDER, FOR SOLUTION INTRAVENOUS at 04:53

## 2025-07-12 RX ADMIN — Medication 10 ML: at 20:34

## 2025-07-12 RX ADMIN — TOBRAMYCIN 300 MG: 300 SOLUTION RESPIRATORY (INHALATION) at 10:03

## 2025-07-12 RX ADMIN — HYDROMORPHONE HYDROCHLORIDE 0.25 MG: 1 INJECTION, SOLUTION INTRAMUSCULAR; INTRAVENOUS; SUBCUTANEOUS at 04:52

## 2025-07-12 RX ADMIN — OXYCODONE HYDROCHLORIDE AND ACETAMINOPHEN 1 TABLET: 5; 325 TABLET ORAL at 01:07

## 2025-07-12 RX ADMIN — MORPHINE SULFATE 2 MG: 2 INJECTION, SOLUTION INTRAMUSCULAR; INTRAVENOUS at 03:25

## 2025-07-12 RX ADMIN — ARFORMOTEROL TARTRATE 15 MCG: 15 SOLUTION RESPIRATORY (INHALATION) at 10:03

## 2025-07-12 RX ADMIN — BUDESONIDE 0.5 MG: 0.5 SUSPENSION RESPIRATORY (INHALATION) at 10:03

## 2025-07-12 RX ADMIN — PANTOPRAZOLE SODIUM 40 MG: 40 TABLET, DELAYED RELEASE ORAL at 18:22

## 2025-07-12 RX ADMIN — ACETAMINOPHEN 650 MG: 325 TABLET ORAL at 04:03

## 2025-07-12 RX ADMIN — FLUTICASONE PROPIONATE 1 SPRAY: 50 SPRAY, METERED NASAL at 09:47

## 2025-07-12 RX ADMIN — TOBRAMYCIN 300 MG: 300 SOLUTION RESPIRATORY (INHALATION) at 20:33

## 2025-07-12 NOTE — THERAPY EVALUATION
RT EQUIPMENT DEVICE RELATED - SKIN ASSESSMENT    RT Medical Equipment/Device:     NIV Mask:  Under-the-nose   size:  B    Skin Assessment:      CHEEKS, NOSE, LIPS:  Intact    Device Skin Pressure Protection:  Positioning supports utilized    Nurse Notification:  Gladis Horn, RRT

## 2025-07-12 NOTE — PLAN OF CARE
Goal Outcome Evaluation:         Patient wore BIPAP last night, settings are 12/6 245. Patient is currently resting comfortable on 3L NC.

## 2025-07-12 NOTE — PROGRESS NOTES
Charlette Rai is a 89 y.o. female who qualifies for IV to PO therapy conversion.    Pantoprazole has been changed from IV to PO per System Policy.       Marie Pearl, UrielD

## 2025-07-12 NOTE — PROGRESS NOTES
[facility]   Hospitalist Progress Note  Date: 2025  Patient Name: Charlette Rai  : 1935  MRN: [id number]  Date of admission: 2025    Subjective   Subjective     Chief Complaint:   Fall, weakness    Summary:   89-year-old female with a past medical history significant for peripheral arterial disease, renal artery stenosis status post stenting, hypertension, ESRD on HD, diastolic CHF, COPD, lung cancer on chemotherapy, type 2 diabetes mellitus, reportedly patient has been having significant diarrhea for several days, has not eaten for couple days, had been getting IV fluids with dialysis because of hypotension, patient was recently in the hospital from  to 7/3/2025 at that time she came in with complaint of hematemesis and worsening weakness.  Patient had EGD which showed esophagitis and gastritis.  Patient did receive a unit of blood at that time.  Patient was restarted on aspirin at discharge.  Of note on 2025 patient had a fall and acute periprosthetic femoral fracture and was sent to [facility] for repair.  In the emergency department the patient was found to be hypotensive, was 78% on room air, chest x-ray showed volume loss and haziness throughout the left hemithorax.  Of note patient does have a history of lung cancer and left upper lobe ectomy patient underwent CT scan of the head which was negative for any acute abnormalities however did have some chronic small vessel ischemic changes.  Patient was found to have UTI via UA.  Patient was admitted to the hospital for further workup and management    Interval Followup:   No acute events over the past 24 hours, patient's pain is improved with IV and oral pain medications.    Objective   Objective     Vitals:   Temp:  [97.3 °F (36.3 °C)-98.1 °F (36.7 °C)] 97.3 °F (36.3 °C)  Heart Rate:  [73-93] 93  Resp:  [14-18] 18  BP: (113-157)/(32-81) 113/70  Flow (L/min) (Oxygen Therapy):  [2-3] 3    Physical Exam   GEN: No acute  distress, resting comfortably in bed  HEENT: Moist mucous membranes  LUNGS: Equal chest rise bilaterally  CARDIAC: Regular rate and rhythm  NEURO: Moving all 4 extremities spontaneously  SKIN: Multiple skin excoriations    Result Review    Result Review:  I have personally reviewed the results as below  [x]  Laboratory CBC, CMP personally reviewed  CBC          7/10/2025    04:36 7/11/2025    04:25 7/12/2025    04:35   CBC   WBC 18.10  13.14  10.60    RBC 2.48  2.48  2.40    Hemoglobin 8.0  7.9  7.5    Hematocrit 25.7  26.3  25.7    .6  106.0  107.1    MCH 32.3  31.9  31.3    MCHC 31.1  30.0  29.2    RDW 19.9  19.4  19.4    Platelets 136  114  102      CMP          7/10/2025    04:36 7/11/2025    04:25 7/12/2025    04:35   CMP   Glucose 86  71  103    BUN 32.1  39.5  26.6    Creatinine 2.63  3.31  2.29    EGFR 17.0  12.9  20.1    Sodium 134  135  135    Potassium 3.5  3.4  3.8    Chloride 99  99  102    Calcium 7.5  7.6  7.6    Total Protein   4.7    Albumin   2.2    Globulin   2.5    Total Bilirubin   0.3    Alkaline Phosphatase   233    AST (SGOT)   120    ALT (SGPT)   46    Albumin/Globulin Ratio   0.9    BUN/Creatinine Ratio 12.2  11.9  11.6    Anion Gap 11.1  12.2  9.4      []  Microbiology  []  Radiology  []  EKG/Telemetry   []  Cardiology/Vascular   []  Pathology  []  Old records  []  Other:    Scheduled medications:  arformoterol, 15 mcg, Nebulization, BID - RT  [Held by provider] aspirin, 81 mg, Oral, Daily  budesonide, 0.5 mg, Nebulization, BID - RT  [Held by provider] carvedilol, 6.25 mg, Oral, BID With Meals  cefepime, 1,000 mg, Intravenous, Q24H  [Held by provider] cetirizine, 10 mg, Oral, Daily  [Held by provider] clopidogrel, 75 mg, Oral, Daily  epoetin mita/mita-epbx, 30,000 Units, Subcutaneous, Weekly  fluticasone, 1 spray, Nasal, Daily  gabapentin, 100 mg, Oral, BID  insulin regular, 2-7 Units, Subcutaneous, Q6H  [Held by provider] isosorbide mononitrate, 30 mg, Oral, Nightly  [Held by  provider] levothyroxine, 50 mcg, Oral, Q AM  [Held by provider] losartan, 25 mg, Oral, Q24H  pantoprazole, 40 mg, Oral, BID AC  [Held by provider] rosuvastatin, 10 mg, Oral, Nightly  sodium chloride, 10 mL, Intravenous, Q12H  tobramycin PF, 300 mg, Nebulization, Q12H - RT      As needed medications:    acetaminophen **OR** acetaminophen **OR** acetaminophen    senna-docusate sodium **AND** polyethylene glycol **AND** bisacodyl **AND** bisacodyl    dextrose    dextrose    glucagon (human recombinant)    heparin (porcine)    hydrALAZINE    HYDROmorphone    ipratropium-albuterol    Morphine    ondansetron ODT **OR** ondansetron    oxyCODONE-acetaminophen    sodium chloride    sodium chloride    sodium chloride  Infusions:            Assessment & Plan   Assessment / Plan     Assessment:  UTI with altered mental status  Hypotension  Anemia  Diarrhea  End-stage renal disease on hemodialysis  Severe protein calorie malnutrition  Chronic diastolic heart failure not in acute exacerbation  Hypothyroidism  Chronic hypoxemia on home oxygen  Severe protein calorie malnutrition  Recent femoral fracture with repair at [facility]    Plan:  Patient admitted to the hospital for further workup and management of above  Continue broad-spectrum antibiotics as patient has recently been in the hospital  Urine culture ordered and pending, no growth to date  Much more alert, speech eval pending for diet  Completed IV fluids continue to monitor volume status off  Wide pulse pressure likely secondary to aortic stenosis as noted on recent echocardiogram  C. difficile negative  FOBT still pending  Continue Protonix  Enteric panel and bacterial stool panel ordered and still pending  Nephrology consulted for dialysis needs  Nutritional consult for severe protein calorie malnutrition  Resume appropriate home medications  Percocet for pain, very low-dose IV Dilaudid for breakthrough pain, tolerating well  CBC, CMP reviewed 7/12/2025   Repeat CBC, CMP,   and Adam in a.m. 7/12/2025      Reviewed patient's labs and imaging, and discussed with patient and nurse at bedside.    VTE Prophylaxis:  Pharmacologic & mechanical VTE prophylaxis orders are present.    Time spent: Time spent involving patient care including face-to-face encounter 52 minutes    CODE STATUS:   Code Status (Patient has no pulse and is not breathing): CPR (Attempt to Resuscitate)  Medical Interventions (Patient has pulse or is breathing): Full Support      Electronically signed by Boom Peñaloza MD, 7/12/2025, 12:32 EDT.

## 2025-07-12 NOTE — PLAN OF CARE
Goal Outcome Evaluation:   Patient on V60 unit tolerating well at this time. Patient comes off to 2Lnc whenever she isn't on V60 unit. No issues or changes @ this time.

## 2025-07-12 NOTE — PROGRESS NOTES
RT EQUIPMENT DEVICE RELATED - SKIN ASSESSMENT    RT Medical Equipment/Device:  NIV Mask: Under-the-nose  size: B    Skin Assessment: Cheek: Intact, Chin: Intact, Nares: Intact, Nose: Intact, Lips: Intact, and Mouth: Intact    Device Skin Pressure Protection: Positioning supports utilized and Pressure points protected    Nurse Notification: Gladis Beckford RRT

## 2025-07-12 NOTE — PLAN OF CARE
Goal Outcome Evaluation:           Progress: no change                Pt is alert , forgeful. Disoriented to time. Pt moans and yells out for pain medication and asks for dilaudid. See emar. Pt resting at t;his time. Pt c/o back and bilateral lower extremeties pain and left hip. Pt has edema right arm and hand. Pt has indention easily from blue pad beneath, so changing to white glide pad. Pt does assist with upper bodyu when repositioning. Pt resting at this time.

## 2025-07-12 NOTE — PROGRESS NOTES
[facility]     Nephrology Progress Note      Patient Name: Charlette Rai  : 1935  MRN: [id number]  Primary Care Physician:  Brennan Mosqueda MD  Date of admission: 2025    Subjective   Subjective     Interval History:  Patient is alert, currently denies any chest pain shortness of breath nausea vomiting.    Review of Systems   All systems were reviewed and negative except for: What is mentioned above.    Objective   Objective     Vitals:   Temp:  [97.3 °F (36.3 °C)-98.1 °F (36.7 °C)] 97.3 °F (36.3 °C)  Heart Rate:  [73-93] 93  Resp:  [14-18] 18  BP: (113-155)/(32-81) 113/70  Flow (L/min) (Oxygen Therapy):  [2-3] 3  Physical Exam:   Constitutional: Awake, alert.   Respiratory: Decreased to auscultation bilaterally, nonlabored respirations    Cardiovascular: RRR, 1/6 to 2/6 systolic ejection murmur   gastrointestinal: Positive bowel sounds, soft, nontender, nondistended   Musculoskeletal:  mild lower extremity swelling no cyanosis   Psychiatric: Appropriate affect, cooperative   Neurologic: Oriented x 3, moving all extremities,   Skin: Some bruising  Chest: Right IJ TDC in place.    Result Review    Result Reviewed:  I have personally reviewed the results from the time of this admission to 2025 14:06 EDT and agree with these findings:  [x]  Laboratory  []  Microbiology  [x]  Radiology  []  EKG/Telemetry   []  Cardiology/Vascular   []  Pathology  [x]  Old records  []  Other:        Lab 25  0435 25  0425 07/10/25  0436 25  1529   SODIUM 135* 135* 134* 133*   POTASSIUM 3.8 3.4* 3.5 3.4*   CHLORIDE 102 99 99 95*   CO2 23.6 23.8 23.9 26.8   BUN 26.6* 39.5* 32.1* 27.0*   CREATININE 2.29* 3.31* 2.63* 2.16*   GLUCOSE 103* 71 86 108*   EGFR 20.1* 12.9* 17.0* 21.5*   ANION GAP 9.4 12.2 11.1 11.2   MAGNESIUM 1.8 1.9 1.9 1.9   PHOSPHORUS 2.9 4.2 3.4  --            Most notable findings include: As above.    Assessment & Plan   Assessment / Plan       Active Hospital  Problems:  Active Hospital Problems    Diagnosis     **UTI (urinary tract infection)        Assessment and Plan:      - ESRD, dialysis through right IJ TDC.  Was at [facility] receiving daily dialysis.  Patient with dialysis yesterday will check electrolytes in a.m. and plan for dialysis on Monday inferior- UTI, empirically on antibiotics.     - Recent fall and left hip fracture, status post repair.     - Severe anemia, and recent GI bleed.  Elevated ferritin.  Will continue Epogen with dialysis.  Status post recent EGD and transfusion.  Hemoglobin low we will follow closely     - Hypotension, likely secondary to volume depletion, better now.     - Secondary hyperparathyroidism and hyperphosphatemia, being addressed with dialysis.  Low phosphorus diet.  Sevelamer on hold.  Phosphorus normal currently     - Severe peripheral arterial disease including lower extremities and renal artery stenosis, status post PTA and stent 6/14/2024.  Was on Plavix but currently on hold.     - Lung cancer.  Status post radiation.     - Type 2 diabetes with complications, per primary.    Will follow, please call with any questions!    Electronically signed by Regis Burleson MD, 7/12/2025, 14:06 EDT.    [phone]

## 2025-07-12 NOTE — PLAN OF CARE
Goal Outcome Evaluation:  Plan of Care Reviewed With: patient        Progress: improving  Outcome Evaluation: Patients menation status improved this shift. Patient complained of increase pain in lower legs, MD notified. Meds ordered and given for pain x1 with dilauded on this shift. Patient had dialysis this shift. Blancable areas of redness noted on patients upper back from sheet/pullpad, applied preventative mepilex and silicone barrer cream to back and buttock per skin care orders. Continue q2hr turns and informed night shift RN and PCA. Patient ate supper this shift via her daughter feeding assitance. MD given information on the surgeon who preformed her femur fracture repair.

## 2025-07-13 LAB
ALBUMIN SERPL-MCNC: 1.9 G/DL (ref 3.5–5.2)
ALBUMIN/GLOB SERPL: 0.7 G/DL
ALP SERPL-CCNC: 216 U/L (ref 39–117)
ALT SERPL W P-5'-P-CCNC: 41 U/L (ref 1–33)
ANION GAP SERPL CALCULATED.3IONS-SCNC: 10.5 MMOL/L (ref 5–15)
AST SERPL-CCNC: 82 U/L (ref 1–32)
BASOPHILS # BLD AUTO: 0.07 10*3/MM3 (ref 0–0.2)
BASOPHILS NFR BLD AUTO: 0.7 % (ref 0–1.5)
BILIRUB SERPL-MCNC: 0.3 MG/DL (ref 0–1.2)
BUN SERPL-MCNC: 34.6 MG/DL (ref 8–23)
BUN/CREAT SERPL: 12.4 (ref 7–25)
CALCIUM SPEC-SCNC: 7.4 MG/DL (ref 8.6–10.5)
CHLORIDE SERPL-SCNC: 103 MMOL/L (ref 98–107)
CO2 SERPL-SCNC: 19.5 MMOL/L (ref 22–29)
CREAT SERPL-MCNC: 2.79 MG/DL (ref 0.57–1)
DEPRECATED RDW RBC AUTO: 81.3 FL (ref 37–54)
EGFRCR SERPLBLD CKD-EPI 2021: 15.9 ML/MIN/1.73
EOSINOPHIL # BLD AUTO: 0.23 10*3/MM3 (ref 0–0.4)
EOSINOPHIL NFR BLD AUTO: 2.3 % (ref 0.3–6.2)
ERYTHROCYTE [DISTWIDTH] IN BLOOD BY AUTOMATED COUNT: 19.4 % (ref 12.3–15.4)
GLOBULIN UR ELPH-MCNC: 2.9 GM/DL
GLUCOSE BLDC GLUCOMTR-MCNC: 123 MG/DL (ref 70–99)
GLUCOSE BLDC GLUCOMTR-MCNC: 146 MG/DL (ref 70–99)
GLUCOSE BLDC GLUCOMTR-MCNC: 153 MG/DL (ref 70–99)
GLUCOSE BLDC GLUCOMTR-MCNC: 89 MG/DL (ref 70–99)
GLUCOSE SERPL-MCNC: 99 MG/DL (ref 65–99)
HCT VFR BLD AUTO: 27.3 % (ref 34–46.6)
HGB BLD-MCNC: 7.7 G/DL (ref 12–15.9)
IMM GRANULOCYTES # BLD AUTO: 0.07 10*3/MM3 (ref 0–0.05)
IMM GRANULOCYTES NFR BLD AUTO: 0.7 % (ref 0–0.5)
LYMPHOCYTES # BLD AUTO: 0.51 10*3/MM3 (ref 0.7–3.1)
LYMPHOCYTES NFR BLD AUTO: 5.1 % (ref 19.6–45.3)
MAGNESIUM SERPL-MCNC: 1.9 MG/DL (ref 1.6–2.4)
MCH RBC QN AUTO: 32.4 PG (ref 26.6–33)
MCHC RBC AUTO-ENTMCNC: 28.2 G/DL (ref 31.5–35.7)
MCV RBC AUTO: 114.7 FL (ref 79–97)
MONOCYTES # BLD AUTO: 1.17 10*3/MM3 (ref 0.1–0.9)
MONOCYTES NFR BLD AUTO: 11.8 % (ref 5–12)
NEUTROPHILS NFR BLD AUTO: 7.86 10*3/MM3 (ref 1.7–7)
NEUTROPHILS NFR BLD AUTO: 79.4 % (ref 42.7–76)
NRBC BLD AUTO-RTO: 0 /100 WBC (ref 0–0.2)
PHOSPHATE SERPL-MCNC: 3.3 MG/DL (ref 2.5–4.5)
PLATELET # BLD AUTO: 91 10*3/MM3 (ref 140–450)
PMV BLD AUTO: 9.8 FL (ref 6–12)
POTASSIUM SERPL-SCNC: 4.1 MMOL/L (ref 3.5–5.2)
PROT SERPL-MCNC: 4.8 G/DL (ref 6–8.5)
RBC # BLD AUTO: 2.38 10*6/MM3 (ref 3.77–5.28)
SODIUM SERPL-SCNC: 133 MMOL/L (ref 136–145)
WBC NRBC COR # BLD AUTO: 9.91 10*3/MM3 (ref 3.4–10.8)

## 2025-07-13 PROCEDURE — 63710000001 INSULIN REGULAR HUMAN PER 5 UNITS: Performed by: PHYSICIAN ASSISTANT

## 2025-07-13 PROCEDURE — 94799 UNLISTED PULMONARY SVC/PX: CPT

## 2025-07-13 PROCEDURE — 99232 SBSQ HOSP IP/OBS MODERATE 35: CPT | Performed by: INTERNAL MEDICINE

## 2025-07-13 PROCEDURE — 85025 COMPLETE CBC W/AUTO DIFF WBC: CPT | Performed by: INTERNAL MEDICINE

## 2025-07-13 PROCEDURE — 84100 ASSAY OF PHOSPHORUS: CPT | Performed by: INTERNAL MEDICINE

## 2025-07-13 PROCEDURE — 80053 COMPREHEN METABOLIC PANEL: CPT | Performed by: INTERNAL MEDICINE

## 2025-07-13 PROCEDURE — 25010000002 HYDROMORPHONE 1 MG/ML SOLUTION: Performed by: INTERNAL MEDICINE

## 2025-07-13 PROCEDURE — 25010000002 CEFEPIME PER 500 MG: Performed by: INTERNAL MEDICINE

## 2025-07-13 PROCEDURE — 94664 DEMO&/EVAL PT USE INHALER: CPT

## 2025-07-13 PROCEDURE — 25010000002 MORPHINE PER 10 MG: Performed by: STUDENT IN AN ORGANIZED HEALTH CARE EDUCATION/TRAINING PROGRAM

## 2025-07-13 PROCEDURE — 82948 REAGENT STRIP/BLOOD GLUCOSE: CPT

## 2025-07-13 PROCEDURE — 83735 ASSAY OF MAGNESIUM: CPT | Performed by: INTERNAL MEDICINE

## 2025-07-13 PROCEDURE — 82948 REAGENT STRIP/BLOOD GLUCOSE: CPT | Performed by: PHYSICIAN ASSISTANT

## 2025-07-13 PROCEDURE — 94761 N-INVAS EAR/PLS OXIMETRY MLT: CPT

## 2025-07-13 RX ADMIN — MORPHINE SULFATE 2 MG: 2 INJECTION, SOLUTION INTRAMUSCULAR; INTRAVENOUS at 12:13

## 2025-07-13 RX ADMIN — PANTOPRAZOLE SODIUM 40 MG: 40 TABLET, DELAYED RELEASE ORAL at 06:49

## 2025-07-13 RX ADMIN — Medication 10 ML: at 08:38

## 2025-07-13 RX ADMIN — HYDROMORPHONE HYDROCHLORIDE 0.25 MG: 1 INJECTION, SOLUTION INTRAMUSCULAR; INTRAVENOUS; SUBCUTANEOUS at 03:01

## 2025-07-13 RX ADMIN — MORPHINE SULFATE 2 MG: 2 INJECTION, SOLUTION INTRAMUSCULAR; INTRAVENOUS at 21:02

## 2025-07-13 RX ADMIN — TOBRAMYCIN 300 MG: 300 SOLUTION RESPIRATORY (INHALATION) at 21:52

## 2025-07-13 RX ADMIN — TOBRAMYCIN 300 MG: 300 SOLUTION RESPIRATORY (INHALATION) at 09:21

## 2025-07-13 RX ADMIN — ARFORMOTEROL TARTRATE 15 MCG: 15 SOLUTION RESPIRATORY (INHALATION) at 09:21

## 2025-07-13 RX ADMIN — OXYCODONE HYDROCHLORIDE AND ACETAMINOPHEN 1 TABLET: 5; 325 TABLET ORAL at 17:52

## 2025-07-13 RX ADMIN — FLUTICASONE PROPIONATE 1 SPRAY: 50 SPRAY, METERED NASAL at 08:38

## 2025-07-13 RX ADMIN — HUMAN INSULIN 2 UNITS: 100 INJECTION, SOLUTION SUBCUTANEOUS at 23:18

## 2025-07-13 RX ADMIN — PANTOPRAZOLE SODIUM 40 MG: 40 TABLET, DELAYED RELEASE ORAL at 17:52

## 2025-07-13 RX ADMIN — CEFEPIME 1000 MG: 1 INJECTION, POWDER, FOR SOLUTION INTRAMUSCULAR; INTRAVENOUS at 17:53

## 2025-07-13 RX ADMIN — BUDESONIDE 0.5 MG: 0.5 SUSPENSION RESPIRATORY (INHALATION) at 21:52

## 2025-07-13 RX ADMIN — BUDESONIDE 0.5 MG: 0.5 SUSPENSION RESPIRATORY (INHALATION) at 09:21

## 2025-07-13 RX ADMIN — OXYCODONE HYDROCHLORIDE AND ACETAMINOPHEN 1 TABLET: 5; 325 TABLET ORAL at 01:02

## 2025-07-13 RX ADMIN — GABAPENTIN 100 MG: 100 CAPSULE ORAL at 08:37

## 2025-07-13 RX ADMIN — GABAPENTIN 100 MG: 100 CAPSULE ORAL at 21:01

## 2025-07-13 RX ADMIN — ARFORMOTEROL TARTRATE 15 MCG: 15 SOLUTION RESPIRATORY (INHALATION) at 21:52

## 2025-07-13 NOTE — PLAN OF CARE
Goal Outcome Evaluation:           Progress: no change          Pt is pleasant and cooperative. Pt does c/o pain in left hip, left knee, back, bilateral legs. Pt at times states pain of 8 to 9. Pt states has pain when repositioned. Pt pale generally and generalized weakness. Pt does require assistance with turning and repositioning. Pt does voice needs with clear voice. Forgetful and disoriented to time. Pt's heels are elevated and has blue soft heel protectors. Dressings on bilateral heels for protection. Will continue to monitor pt.

## 2025-07-13 NOTE — PROGRESS NOTES
[facility]     Nephrology Progress Note      Patient Name: Charlette Rai  : 1935  MRN: [id number]  Primary Care Physician:  Brennan Mosqueda MD  Date of admission: 2025    Subjective   Subjective     Interval History:  Patient is alert, states she is very tired and achy no shortness of breath no nausea vomiting no significant chest pain.    Review of Systems   All systems were reviewed and negative except for: What is mentioned above.    Objective   Objective     Vitals:   Temp:  [97.3 °F (36.3 °C)-98.8 °F (37.1 °C)] 98.4 °F (36.9 °C)  Heart Rate:  [73-86] 83  Resp:  [16-24] 16  BP: (118-150)/(36-44) 134/36  Flow (L/min) (Oxygen Therapy):  [1-3] 1  Physical Exam:   Constitutional: Awake, alert.   Respiratory: Decreased to auscultation bilaterally, nonlabored respirations    Cardiovascular: RRR, 2/6 systolic ejection murmur   gastrointestinal: Positive bowel sounds, soft, nontender, nondistended   Musculoskeletal: No significant lower extremity swelling no cyanosis   Psychiatric: Appropriate affect, cooperative   Neurologic: Oriented x 3, moving all extremities,   Skin: Some bruising  Chest: Right IJ TDC in place.    Result Review    Result Reviewed:  I have personally reviewed the results from the time of this admission to 2025 13:00 EDT and agree with these findings:  [x]  Laboratory  []  Microbiology  [x]  Radiology  []  EKG/Telemetry   []  Cardiology/Vascular   []  Pathology  [x]  Old records  []  Other:        Lab 25  0440 25  0435 25  0425 07/10/25  0436 25  1529   SODIUM 133* 135* 135* 134* 133*   POTASSIUM 4.1 3.8 3.4* 3.5 3.4*   CHLORIDE 103 102 99 99 95*   CO2 19.5* 23.6 23.8 23.9 26.8   BUN 34.6* 26.6* 39.5* 32.1* 27.0*   CREATININE 2.79* 2.29* 3.31* 2.63* 2.16*   GLUCOSE 99 103* 71 86 108*   EGFR 15.9* 20.1* 12.9* 17.0* 21.5*   ANION GAP 10.5 9.4 12.2 11.1 11.2   MAGNESIUM 1.9 1.8 1.9 1.9 1.9   PHOSPHORUS 3.3 2.9 4.2 3.4  --            Most notable  findings include: As above.    Assessment & Plan   Assessment / Plan       Active Hospital Problems:  Active Hospital Problems    Diagnosis     **UTI (urinary tract infection)        Assessment and Plan:      - ESRD, dialysis through right IJ TDC.  Was at [facility] receiving daily dialysis.  Patient with dialysis on Friday will schedule dialysis again tomorrow.    UTI, empirically on antibiotics.     - Recent fall and left hip fracture, status post repair.     - Severe anemia, and recent GI bleed.  Elevated ferritin.  Will continue Epogen with dialysis.  Status post recent EGD and transfusion.  Hemoglobin 7.7 today    - Hypotension, likely secondary to volume depletion, better now.     - Secondary hyperparathyroidism and hyperphosphatemia, being addressed with dialysis.  Low phosphorus diet.  Sevelamer on hold.  Phosphorus normal currently     - Severe peripheral arterial disease including lower extremities and renal artery stenosis, status post PTA and stent 6/14/2024.  Back on Plavix and baby aspirin.     - Lung cancer.  Status post radiation.     - Type 2 diabetes with complications, per primary.    Will follow, please call with any questions!    Electronically signed by Regis Burleson MD, 7/13/2025, 13:00 EDT.    [phone]

## 2025-07-13 NOTE — PLAN OF CARE
Goal Outcome Evaluation:      Patient requested not to use the BiPAP therapy this past evening, stating that she wanted a night off to rest. Patient used 2LNC throughout the night.

## 2025-07-13 NOTE — PLAN OF CARE
Goal Outcome Evaluation:      Patient did not wear bipap last night. Currently on 1l nasal cannula, tolerating well.

## 2025-07-13 NOTE — THERAPY EVALUATION
RT EQUIPMENT DEVICE RELATED - SKIN ASSESSMENT    RT Medical Equipment/Device:     NIV Mask:  Under-the-nose   size:       Skin Assessment:      Cheek:  Intact  Nose:  Intact  Mouth:  Intact    Device Skin Pressure Protection:  Pressure points protected    Nurse Notification:  Gladis Mcnamara, RRT

## 2025-07-13 NOTE — PLAN OF CARE
Goal Outcome Evaluation:  Plan of Care Reviewed With: patient        Progress: improving  Outcome Evaluation: Patient complained of pain and medicated per orders, vitals stable, q2hr turns.

## 2025-07-13 NOTE — PROGRESS NOTES
[facility]   Hospitalist Progress Note  Date: 2025  Patient Name: Charlette Rai  : 1935  MRN: [id number]  Date of admission: 2025    Subjective   Subjective     Chief Complaint:   Fall, weakness    Summary:   89-year-old female with a past medical history significant for peripheral arterial disease, renal artery stenosis status post stenting, hypertension, ESRD on HD, diastolic CHF, COPD, lung cancer on chemotherapy, type 2 diabetes mellitus, reportedly patient has been having significant diarrhea for several days, has not eaten for couple days, had been getting IV fluids with dialysis because of hypotension, patient was recently in the hospital from  to 7/3/2025 at that time she came in with complaint of hematemesis and worsening weakness.  Patient had EGD which showed esophagitis and gastritis.  Patient did receive a unit of blood at that time.  Patient was restarted on aspirin at discharge.  Of note on 2025 patient had a fall and acute periprosthetic femoral fracture and was sent to [facility] for repair.  In the emergency department the patient was found to be hypotensive, was 78% on room air, chest x-ray showed volume loss and haziness throughout the left hemithorax.  Of note patient does have a history of lung cancer and left upper lobe ectomy patient underwent CT scan of the head which was negative for any acute abnormalities however did have some chronic small vessel ischemic changes.  Patient was found to have UTI via UA.  Patient was admitted to the hospital for further workup and management    Interval Followup:   No acute events overnight, patient resting comfortably in bed    Objective   Objective     Vitals:   Temp:  [97.3 °F (36.3 °C)-98.8 °F (37.1 °C)] 98.1 °F (36.7 °C)  Heart Rate:  [73-93] 79  Resp:  [16-24] 16  BP: (113-150)/(36-70) 145/43  Flow (L/min) (Oxygen Therapy):  [2-3] 2    Physical Exam   GEN: No acute distress, resting comfortably in  bed  HEENT: Moist mucous membranes  LUNGS: Equal chest rise bilaterally  CARDIAC: Regular rate and rhythm  NEURO: Moving all 4 extremities spontaneously  SKIN: Multiple skin excoriations    Result Review    Result Review:  I have personally reviewed the results as below  [x]  Laboratory CBC, CMP personally reviewed  CBC          7/11/2025    04:25 7/12/2025    04:35 7/13/2025    04:40   CBC   WBC 13.14  10.60  9.91    RBC 2.48  2.40  2.38    Hemoglobin 7.9  7.5  7.7    Hematocrit 26.3  25.7  27.3    .0  107.1  114.7    MCH 31.9  31.3  32.4    MCHC 30.0  29.2  28.2    RDW 19.4  19.4  19.4    Platelets 114  102  91      CMP          7/11/2025    04:25 7/12/2025    04:35 7/13/2025    04:40   CMP   Glucose 71  103  99    BUN 39.5  26.6  34.6    Creatinine 3.31  2.29  2.79    EGFR 12.9  20.1  15.9    Sodium 135  135  133    Potassium 3.4  3.8  4.1    Chloride 99  102  103    Calcium 7.6  7.6  7.4    Total Protein  4.7  4.8    Albumin  2.2  1.9    Globulin  2.5  2.9    Total Bilirubin  0.3  0.3    Alkaline Phosphatase  233  216    AST (SGOT)  120  82    ALT (SGPT)  46  41    Albumin/Globulin Ratio  0.9  0.7    BUN/Creatinine Ratio 11.9  11.6  12.4    Anion Gap 12.2  9.4  10.5      []  Microbiology  []  Radiology  []  EKG/Telemetry   []  Cardiology/Vascular   []  Pathology  []  Old records  []  Other:    Scheduled medications:  arformoterol, 15 mcg, Nebulization, BID - RT  [Held by provider] aspirin, 81 mg, Oral, Daily  budesonide, 0.5 mg, Nebulization, BID - RT  [Held by provider] carvedilol, 6.25 mg, Oral, BID With Meals  cefepime, 1,000 mg, Intravenous, Q24H  [Held by provider] cetirizine, 10 mg, Oral, Daily  [Held by provider] clopidogrel, 75 mg, Oral, Daily  epoetin mita/mita-epbx, 30,000 Units, Subcutaneous, Weekly  fluticasone, 1 spray, Nasal, Daily  gabapentin, 100 mg, Oral, BID  insulin regular, 2-7 Units, Subcutaneous, Q6H  [Held by provider] isosorbide mononitrate, 30 mg, Oral, Nightly  [Held by provider]  levothyroxine, 50 mcg, Oral, Q AM  [Held by provider] losartan, 25 mg, Oral, Q24H  pantoprazole, 40 mg, Oral, BID AC  [Held by provider] rosuvastatin, 10 mg, Oral, Nightly  sodium chloride, 10 mL, Intravenous, Q12H  tobramycin PF, 300 mg, Nebulization, Q12H - RT      As needed medications:    acetaminophen **OR** acetaminophen **OR** acetaminophen    senna-docusate sodium **AND** polyethylene glycol **AND** bisacodyl **AND** bisacodyl    dextrose    dextrose    glucagon (human recombinant)    heparin (porcine)    hydrALAZINE    HYDROmorphone    ipratropium-albuterol    Morphine    ondansetron ODT **OR** ondansetron    oxyCODONE-acetaminophen    sodium chloride    sodium chloride    sodium chloride  Infusions:            Assessment & Plan   Assessment / Plan     Assessment:  UTI with altered mental status  Peripheral vascular disease and renal artery stenosis status post PTA and stent on 6/14/2024  Hypotension  Anemia  Diarrhea  End-stage renal disease on hemodialysis  Severe protein calorie malnutrition  Chronic diastolic heart failure not in acute exacerbation  Hypothyroidism  Chronic hypoxemia on home oxygen  Severe protein calorie malnutrition  Recent femoral fracture with repair at [facility]    Plan:  Patient admitted to the hospital for further workup and management of above  Continue broad-spectrum antibiotics as patient has recently been in the hospital, clinically improved, will plan on 5-day course  Urine culture ordered and pending, no growth to date  Much more alert, tolerating diet  Completed IV fluids continue to monitor volume status off  Wide pulse pressure likely secondary to aortic stenosis as noted on recent echocardiogram  C. difficile negative  FOBT still pending no bowel movements noted  Restart aspirin and Plavix and monitor hemoglobin closely  Continue Protonix  Enteric panel and bacterial stool panel ordered and still pending  Nephrology consulted for dialysis needs  Nutritional consult for  severe protein calorie malnutrition  Resume appropriate home medications  Percocet for pain, very low-dose IV Dilaudid for breakthrough pain, tolerating well  CBC, CMP reviewed 7/13/2025   Repeat CBC, CMP, mag and Phos in a.m. 7/13/2025      Reviewed patient's labs and imaging, and discussed with patient and nurse at bedside.    VTE Prophylaxis:  Pharmacologic & mechanical VTE prophylaxis orders are present.      CODE STATUS:   Code Status (Patient has no pulse and is not breathing): CPR (Attempt to Resuscitate)  Medical Interventions (Patient has pulse or is breathing): Full Support      Electronically signed by Boom Peñaloza MD, 7/13/2025, 09:04 EDT.

## 2025-07-14 ENCOUNTER — APPOINTMENT (OUTPATIENT)
Dept: CT IMAGING | Facility: HOSPITAL | Age: 89
DRG: 689 | End: 2025-07-14
Payer: MEDICARE

## 2025-07-14 ENCOUNTER — APPOINTMENT (OUTPATIENT)
Dept: MRI IMAGING | Facility: HOSPITAL | Age: 89
DRG: 689 | End: 2025-07-14
Payer: MEDICARE

## 2025-07-14 LAB
ALBUMIN SERPL-MCNC: 2.1 G/DL (ref 3.5–5.2)
ALBUMIN/GLOB SERPL: 0.8 G/DL
ALP SERPL-CCNC: 231 U/L (ref 39–117)
ALT SERPL W P-5'-P-CCNC: 38 U/L (ref 1–33)
ANION GAP SERPL CALCULATED.3IONS-SCNC: 11.4 MMOL/L (ref 5–15)
AST SERPL-CCNC: 60 U/L (ref 1–32)
BACTERIA SPEC AEROBE CULT: NORMAL
BACTERIA SPEC AEROBE CULT: NORMAL
BASOPHILS # BLD AUTO: 0.04 10*3/MM3 (ref 0–0.2)
BASOPHILS NFR BLD AUTO: 0.5 % (ref 0–1.5)
BILIRUB SERPL-MCNC: 0.3 MG/DL (ref 0–1.2)
BUN SERPL-MCNC: 44.5 MG/DL (ref 8–23)
BUN/CREAT SERPL: 13.9 (ref 7–25)
CALCIUM SPEC-SCNC: 7.5 MG/DL (ref 8.6–10.5)
CHLORIDE SERPL-SCNC: 104 MMOL/L (ref 98–107)
CO2 SERPL-SCNC: 20.6 MMOL/L (ref 22–29)
CREAT SERPL-MCNC: 3.21 MG/DL (ref 0.57–1)
DEPRECATED RDW RBC AUTO: 76.5 FL (ref 37–54)
EGFRCR SERPLBLD CKD-EPI 2021: 13.4 ML/MIN/1.73
EOSINOPHIL # BLD AUTO: 0.16 10*3/MM3 (ref 0–0.4)
EOSINOPHIL NFR BLD AUTO: 1.9 % (ref 0.3–6.2)
ERYTHROCYTE [DISTWIDTH] IN BLOOD BY AUTOMATED COUNT: 19.2 % (ref 12.3–15.4)
GLOBULIN UR ELPH-MCNC: 2.8 GM/DL
GLUCOSE BLDC GLUCOMTR-MCNC: 102 MG/DL (ref 70–99)
GLUCOSE BLDC GLUCOMTR-MCNC: 106 MG/DL (ref 70–99)
GLUCOSE BLDC GLUCOMTR-MCNC: 118 MG/DL (ref 70–99)
GLUCOSE BLDC GLUCOMTR-MCNC: 80 MG/DL (ref 70–99)
GLUCOSE BLDC GLUCOMTR-MCNC: 92 MG/DL (ref 70–99)
GLUCOSE BLDC GLUCOMTR-MCNC: 97 MG/DL (ref 70–99)
GLUCOSE SERPL-MCNC: 85 MG/DL (ref 65–99)
HCT VFR BLD AUTO: 25.5 % (ref 34–46.6)
HGB BLD-MCNC: 7.4 G/DL (ref 12–15.9)
IMM GRANULOCYTES # BLD AUTO: 0.07 10*3/MM3 (ref 0–0.05)
IMM GRANULOCYTES NFR BLD AUTO: 0.8 % (ref 0–0.5)
LYMPHOCYTES # BLD AUTO: 0.6 10*3/MM3 (ref 0.7–3.1)
LYMPHOCYTES NFR BLD AUTO: 7.2 % (ref 19.6–45.3)
Lab: NORMAL
Lab: NORMAL
MAGNESIUM SERPL-MCNC: 2 MG/DL (ref 1.6–2.4)
MCH RBC QN AUTO: 31.6 PG (ref 26.6–33)
MCHC RBC AUTO-ENTMCNC: 29 G/DL (ref 31.5–35.7)
MCV RBC AUTO: 109 FL (ref 79–97)
MONOCYTES # BLD AUTO: 0.89 10*3/MM3 (ref 0.1–0.9)
MONOCYTES NFR BLD AUTO: 10.7 % (ref 5–12)
NEUTROPHILS NFR BLD AUTO: 6.58 10*3/MM3 (ref 1.7–7)
NEUTROPHILS NFR BLD AUTO: 78.9 % (ref 42.7–76)
NRBC BLD AUTO-RTO: 0 /100 WBC (ref 0–0.2)
PHOSPHATE SERPL-MCNC: 3.4 MG/DL (ref 2.5–4.5)
PLATELET # BLD AUTO: 96 10*3/MM3 (ref 140–450)
PMV BLD AUTO: 9.9 FL (ref 6–12)
POTASSIUM SERPL-SCNC: 3.8 MMOL/L (ref 3.5–5.2)
PROT SERPL-MCNC: 4.9 G/DL (ref 6–8.5)
RBC # BLD AUTO: 2.34 10*6/MM3 (ref 3.77–5.28)
SODIUM SERPL-SCNC: 136 MMOL/L (ref 136–145)
WBC NRBC COR # BLD AUTO: 8.34 10*3/MM3 (ref 3.4–10.8)

## 2025-07-14 PROCEDURE — 99222 1ST HOSP IP/OBS MODERATE 55: CPT | Performed by: PSYCHIATRY & NEUROLOGY

## 2025-07-14 PROCEDURE — 82948 REAGENT STRIP/BLOOD GLUCOSE: CPT

## 2025-07-14 PROCEDURE — 70450 CT HEAD/BRAIN W/O DYE: CPT

## 2025-07-14 PROCEDURE — 94799 UNLISTED PULMONARY SVC/PX: CPT

## 2025-07-14 PROCEDURE — 97161 PT EVAL LOW COMPLEX 20 MIN: CPT

## 2025-07-14 PROCEDURE — 70498 CT ANGIOGRAPHY NECK: CPT

## 2025-07-14 PROCEDURE — 94761 N-INVAS EAR/PLS OXIMETRY MLT: CPT

## 2025-07-14 PROCEDURE — 25510000001 IOPAMIDOL PER 1 ML: Performed by: INTERNAL MEDICINE

## 2025-07-14 PROCEDURE — 84100 ASSAY OF PHOSPHORUS: CPT | Performed by: INTERNAL MEDICINE

## 2025-07-14 PROCEDURE — 85025 COMPLETE CBC W/AUTO DIFF WBC: CPT | Performed by: INTERNAL MEDICINE

## 2025-07-14 PROCEDURE — 83735 ASSAY OF MAGNESIUM: CPT | Performed by: INTERNAL MEDICINE

## 2025-07-14 PROCEDURE — 80053 COMPREHEN METABOLIC PANEL: CPT | Performed by: INTERNAL MEDICINE

## 2025-07-14 PROCEDURE — 70551 MRI BRAIN STEM W/O DYE: CPT

## 2025-07-14 PROCEDURE — 92526 ORAL FUNCTION THERAPY: CPT

## 2025-07-14 PROCEDURE — 99233 SBSQ HOSP IP/OBS HIGH 50: CPT | Performed by: INTERNAL MEDICINE

## 2025-07-14 PROCEDURE — 70496 CT ANGIOGRAPHY HEAD: CPT

## 2025-07-14 RX ORDER — SODIUM CHLORIDE 0.9 % (FLUSH) 0.9 %
10 SYRINGE (ML) INJECTION EVERY 12 HOURS SCHEDULED
Status: DISCONTINUED | OUTPATIENT
Start: 2025-07-14 | End: 2025-07-18 | Stop reason: HOSPADM

## 2025-07-14 RX ORDER — IOPAMIDOL 755 MG/ML
100 INJECTION, SOLUTION INTRAVASCULAR
Status: COMPLETED | OUTPATIENT
Start: 2025-07-14 | End: 2025-07-14

## 2025-07-14 RX ORDER — SODIUM CHLORIDE 9 MG/ML
40 INJECTION, SOLUTION INTRAVENOUS AS NEEDED
Status: DISCONTINUED | OUTPATIENT
Start: 2025-07-14 | End: 2025-07-18 | Stop reason: HOSPADM

## 2025-07-14 RX ORDER — SODIUM CHLORIDE 0.9 % (FLUSH) 0.9 %
10 SYRINGE (ML) INJECTION AS NEEDED
Status: DISCONTINUED | OUTPATIENT
Start: 2025-07-14 | End: 2025-07-18 | Stop reason: HOSPADM

## 2025-07-14 RX ADMIN — CLOPIDOGREL BISULFATE 75 MG: 75 TABLET, FILM COATED ORAL at 15:02

## 2025-07-14 RX ADMIN — ARFORMOTEROL TARTRATE 15 MCG: 15 SOLUTION RESPIRATORY (INHALATION) at 21:12

## 2025-07-14 RX ADMIN — Medication 10 ML: at 21:11

## 2025-07-14 RX ADMIN — Medication 10 ML: at 15:04

## 2025-07-14 RX ADMIN — IOPAMIDOL 92 ML: 755 INJECTION, SOLUTION INTRAVENOUS at 11:15

## 2025-07-14 RX ADMIN — ROSUVASTATIN CALCIUM 10 MG: 5 TABLET, FILM COATED ORAL at 21:10

## 2025-07-14 RX ADMIN — Medication 10 ML: at 21:45

## 2025-07-14 RX ADMIN — ACETAMINOPHEN 650 MG: 325 TABLET ORAL at 15:01

## 2025-07-14 RX ADMIN — PANTOPRAZOLE SODIUM 40 MG: 40 TABLET, DELAYED RELEASE ORAL at 17:46

## 2025-07-14 RX ADMIN — ASPIRIN 81 MG: 81 TABLET, COATED ORAL at 15:02

## 2025-07-14 RX ADMIN — OXYCODONE HYDROCHLORIDE AND ACETAMINOPHEN 1 TABLET: 5; 325 TABLET ORAL at 01:05

## 2025-07-14 RX ADMIN — Medication 10 ML: at 15:03

## 2025-07-14 RX ADMIN — BUDESONIDE 0.5 MG: 0.5 SUSPENSION RESPIRATORY (INHALATION) at 21:12

## 2025-07-14 NOTE — PROGRESS NOTES
[facility]     Nephrology Progress Note      Patient Name: Charlette Rai  : 1935  MRN: [id number]  Primary Care Physician:  Brennan Mosqueda MD  Date of admission: 2025    Subjective   Subjective     Interval History:  Patient was seen earlier this morning while on dialysis.  Tired looking, slightly altered.  Hemodynamically stable.  Bath adjusted according to her labs this morning.  Ultrafiltration goal reduced to 0.5 kg.    Stroke RRT called after I saw her and before I entered this note.  I received a call from the dialysis nurse that she had facial droop and was more lethargic.  Patient was seen by neurology, had brain imaging, no acute stroke noted.  I came back to see her this afternoon in her room.  Family at bedside.  She seems more stable, she appears similar to what I saw this morning.  No shortness of breath or chest pain.      Review of Systems   All systems were reviewed and negative except for: What is mentioned above.    Objective   Objective     Vitals:   Temp:  [97.3 °F (36.3 °C)-99 °F (37.2 °C)] 97.5 °F (36.4 °C)  Heart Rate:  [72-99] 94  Resp:  [11-22] 20  BP: (115-163)/(40-89) 143/48  Flow (L/min) (Oxygen Therapy):  [1] 1  Physical Exam:   Constitutional: Awake, alert.  Tired looking, slightly slow to respond.   Respiratory: Decreased to auscultation bilaterally, nonlabored respirations    Cardiovascular: RRR, 2/6 systolic ejection murmur   gastrointestinal: Positive bowel sounds, soft, nontender, nondistended   Musculoskeletal: No significant lower extremity swelling no cyanosis   Psychiatric: Appropriate affect, cooperative   Neurologic: Oriented, moving all extremities,   Skin: Some bruising  Chest: Right IJ TDC in place.    Result Review    Result Reviewed:  I have personally reviewed the results from the time of this admission to 2025 16:07 EDT and agree with these findings:  [x]  Laboratory  []  Microbiology  [x]  Radiology  []  EKG/Telemetry   []   Cardiology/Vascular   []  Pathology  [x]  Old records  []  Other:        Lab 07/14/25  0439 07/13/25  0440 07/12/25  0435 07/11/25  0425 07/10/25  0436 07/09/25  1529   SODIUM 136 133* 135* 135* 134* 133*   POTASSIUM 3.8 4.1 3.8 3.4* 3.5 3.4*   CHLORIDE 104 103 102 99 99 95*   CO2 20.6* 19.5* 23.6 23.8 23.9 26.8   BUN 44.5* 34.6* 26.6* 39.5* 32.1* 27.0*   CREATININE 3.21* 2.79* 2.29* 3.31* 2.63* 2.16*   GLUCOSE 85 99 103* 71 86 108*   EGFR 13.4* 15.9* 20.1* 12.9* 17.0* 21.5*   ANION GAP 11.4 10.5 9.4 12.2 11.1 11.2   MAGNESIUM 2.0 1.9 1.8 1.9 1.9 1.9   PHOSPHORUS 3.4 3.3 2.9 4.2 3.4  --            Most notable findings include: As above.    Assessment & Plan   Assessment / Plan       Active Hospital Problems:  Active Hospital Problems    Diagnosis     **UTI (urinary tract infection)        Assessment and Plan:      - ESRD, dialysis through right IJ TDC.  Was at [facility] receiving daily dialysis.  Here dialysis MWF schedule.  Gentle dialysis prescription.  Adjusted to her labs.  Electrolytes and volume status are adequate.  UTI, empirically on antibiotics.     - Recent fall and left hip fracture, status post repair.     - Severe anemia, and recent GI bleed.  Elevated ferritin.  Will continue Epogen with dialysis.  Status post recent EGD and transfusion.  Hemoglobin 7.4 today, may need transfusion soon.    - Hypotension, likely secondary to volume depletion, better now.     - Secondary hyperparathyroidism and hyperphosphatemia, being addressed with dialysis.  Low phosphorus diet.  Sevelamer on hold.  Phosphorus normal currently     - Severe peripheral arterial disease including lower extremities and renal artery stenosis, status post PTA and stent 6/14/2024.  Back on Plavix and baby aspirin.     - Lung cancer.  Status post radiation.     - Type 2 diabetes with complications, per primary.    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    Discussed with primary, her family, nursing staff and dialysis nurse.    Will follow, please call with any questions!    Electronically signed by Edi García MD, 7/14/2025, 16:07 EDT.    [phone]

## 2025-07-14 NOTE — PLAN OF CARE
Goal Outcome Evaluation:  Plan of Care Reviewed With: patient        Progress: improving  Outcome Evaluation: Pt is AO x4. On 1L NC. Medicated for pain. Skin care performed this shift. On continuous pulse ox. Q2T. Bed alarm on. Blood glucose monitored and given supplemental insulin. No new issues at this time. Continue plan of care.

## 2025-07-14 NOTE — THERAPY TREATMENT NOTE
Acute Care - Speech Language Pathology   Swallow Treatment Note MAURICE Escamilla     Patient Name: Charlette Rai  : 1935  MRN: [id number]  Today's Date: 2025               Admit Date: 2025    Visit Dx:     ICD-10-CM ICD-9-CM   1. Acute UTI  N39.0 599.0   2. Altered mental status, unspecified altered mental status type  R41.82 780.97   3. Oropharyngeal dysphagia  R13.12 787.22   4. Decreased activities of daily living (ADL)  Z78.9 V49.89     Patient Active Problem List   Diagnosis    Malignant neoplasm of upper lobe of right lung    Rheumatoid arthritis    Asthma    Chronic heart failure with preserved ejection fraction    Essential hypertension    GERD (gastroesophageal reflux disease)    Hyperlipidemia LDL goal <70    Lumbar spinal stenosis    Migraine    Monoclonal paraproteinemia    Osteopenia    Oxygen dependent    Peripheral neuropathy    Stage 4 chronic kidney disease    Vitamin D deficiency    Carotid artery stenosis    Chronic right-sided thoracic back pain    Peripheral vascular disease of lower extremity    Ex-smoker    De Quervain's tenosynovitis    Arthritis of carpometacarpal (CMC) joint of right thumb    Steal syndrome of dialysis vascular access    Anemia of chronic renal failure, stage 4 (severe)    Aortic stenosis, moderate    Hematoma    End stage renal disease on dialysis    Coronary artery calcification seen on CT scan    Frailty syndrome in geriatric patient    COPD with lower respiratory infection    Coagulation defect, unspecified    Hyperphosphatemia    Hypothyroidism (acquired)    Idiopathic osteoarthritis    Secondary multiple arthritis    Type 2 diabetes mellitus with diabetic peripheral angiopathy without gangrene    Renal artery stenosis    S/P renal artery angioplasty    Atherosclerosis of renal artery    Abnormal serum immunoelectrophoresis    NSVT (nonsustained ventricular tachycardia)    Hypertensive heart and chronic kidney disease with heart failure and with stage 5  chronic kidney disease, or end stage renal disease    Unspecified protein-calorie malnutrition    IFG (impaired fasting glucose)    Iron deficiency anemia    AV graft malfunction, initial encounter    AV graft malfunction    Weakness    Malignant neoplasm of lower lobe of left lung    Malignant neoplasm of lower lobe, right bronchus or lung    Severe protein-calorie malnutrition    UTI (urinary tract infection)     Past Medical History:   Diagnosis Date    Allergic rhinitis     Anaphylactic shock, unspecified, initial encounter 12/05/2023    Anemia     Arthritis     Asthma     USE INHALERS AND NEBULIZERS    Back pain     Bladder disorder     Cancer     LEFT LUNG CANCER 2005 SURGERY AND CHEMO DONE  AND CURRENTLY 4/ 14/22  RIGHT LUNG CANCER HAS ONLY RECEIVED RADIATION THUS FAR    Chronic kidney disease     stage 3    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 DENIES CP BUT DOES HAVE SOA CHRONIC ISSUE COPD/LUNG CANCER    COPD (chronic obstructive pulmonary disease)     FOLLOWED BY DR MARIAJOSE BAILEY    Coronary artery disease     DENIES CP BUT DOES GET SOA MOST OF THE TIME WITH EXERTION BUT OCC AT REST CHRONIC ISSUE COPD/LUNG CANCER    Deep vein thrombosis     Diabetes mellitus     DOES NOT CHECK BS DAILY    Disease of thyroid gland     HYPOTHYROIDISM    Essential hypertension     Gastric ulcer     GERD (gastroesophageal reflux disease)     Heart murmur     History of transfusion     NO ISSUES POST TRANSFUSION WAS MANY YEARS AGO    Hyperlipemia     Leukocytopenia     FOLLOWED BY DR MIR BAILEY    Limb swelling     Lumbago     Lumbar spinal stenosis     Lung cancer     Migraine headache     Multiple joint pain     On home oxygen therapy     4L/NC PRN    Osteopenia     PNA (pneumonia) 05/04/2024    Pseudomonas pneumonia 04/29/2024    Reflux esophagitis     Shortness  of breath     Thyroid nodule     HAS ULTRASOUND YEARLY BEING MONITORED    Vascular disease     Vitamin D deficiency      Past Surgical History:   Procedure Laterality Date    ABDOMINAL SURGERY      ANGIOPLASTY RENAL ARTERY Left 06/14/2024    stent placement    APPENDECTOMY      ARTERIOGRAM N/A 6/14/2024    Procedure: Arteriogram, right radial access, aortogram and renal arteriogram with possible intervention.;  Surgeon: Dio Miguel MD;  Location: [facility] CATH INVASIVE LOCATION;  Service: Peripheral Vascular;  Laterality: N/A;    ARTERIOVENOUS FISTULA/SHUNT SURGERY Left 05/10/2022    Procedure: Left basilic vein transposition;  Surgeon: Wan Barksdale MD;  Location: [facility] MAIN OR;  Service: Vascular;  Laterality: Left;    ARTERIOVENOUS FISTULA/SHUNT SURGERY Left 03/23/2023    Procedure: Ligation of left arm arteriovenous fistula;  Surgeon: Wan Barksdale MD;  Location: [facility] MAIN OR;  Service: Vascular;  Laterality: Left;    ARTERIOVENOUS FISTULA/SHUNT SURGERY Left 11/30/2023    Procedure: Creation of left arm arteriovenous graft;  Surgeon: Wan Barksdale MD;  Location: [facility] MAIN OR;  Service: Vascular;  Laterality: Left;    BRONCHOSCOPY N/A 04/24/2024    Procedure: BRONCHOSCOPY WITH BAL AND WASHINGS;  Surgeon: Khalif Yanez MD;  Location: [facility] ENDOSCOPY;  Service: Pulmonary;  Laterality: N/A;  MUCUS PLUGGING    CARDIAC CATHETERIZATION  1996    CARDIAC SURGERY      CARDIAC SURGERY      fluid drained from heart    CATARACT EXTRACTION, BILATERAL  2003    COLONOSCOPY  2014    ENDOSCOPY  2016    2019    ENDOSCOPY N/A 6/25/2025    Procedure: ESOPHAGOGASTRODUODENOSCOPY;  Surgeon: Tanvi Del Valle MD;  Location: [facility] ENDOSCOPY;  Service: Gastroenterology;  Laterality: N/A;  gastritis, esophagitis    FEMORAL ARTERY STENT Bilateral     HYSTERECTOMY      LEG THROMBECTOMY/EMBOLECTOMY Left 1/29/2025    Procedure: LEFT LOWER EXTREMITY ANGIOGRAM;  Surgeon: Jaden Castañeda MD;  Location: [facility] HYBRID OR;   "Service: Vascular;  Laterality: Left;    LUNG BIOPSY Left 2005    lobectomy upper lung caner    LUNG VOLUME REDUCTION      OTHER SURGICAL HISTORY      artifical joints/limbs    REPLACEMENT TOTAL KNEE Left 2016    SHUNT O GRAM N/A 1/13/2025    Procedure: dialysis shuntogram;  Surgeon: Court Valente MD;  Location: [facility] INVASIVE LOC[facility];  Service: Peripheral Vascular;  Laterality: N/A;    UPPER GASTROINTESTINAL ENDOSCOPY         SLP Recommendation and Plan      SPEECH PATHOLOGY DYSPHAGIA TREATMENT    Subjective/Behavioral Observations: awake, cooperative        Day/time of Treatment:7/14/25        Current Diet:mechanical soft, thin liquids        Current Strategies:small bites and sips, assist with self feeding, meds in applesauce        Treatment received:focused on dysphagia goals        Results of treatment:Patient assisting for self feeding. Scheduled for dialysis today. Patient reports she has been \"keeping everything down\". She does have moderate belching with po however no regurgitation observed. Patient completed single sips of thin liquid by straw. Swallows completed with minimal delay. Consumed 100ml without overt s/s of aspiration. Mechanical soft solids with encouragement for po. Swallows completed. Patient consumed bites with assist.         Progress toward goals:progressing        Barriers to Achieving goals: n/a        Plan of care:/changes in plan: continue plan                                                                                                EDUCATION  The patient has been educated in the following areas:   Dysphagia (Swallowing Impairment).                Time Calculation:    Time Calculation- SLP       Row Name 07/14/25 1009             Time Calculation- SLP    SLP Stop Time 0730  -SN      SLP Received On 07/14/25  -SN         Untimed Charges    13617-BK Treatment Swallow Minutes 45  -SN         Total Minutes    Untimed Charges Total Minutes 45  -SN       Total Minutes 45  -SN  "               User Key  (r) = Recorded By, (t) = Taken By, (c) = Cosigned By      Initials Name Provider Type    SN Denise Crowell MS-CCC/SLP, VISH Speech and Language Pathologist                    Therapy Charges for Today       Code Description Service Date Service Provider Modifiers Qty    [id number]  ST TREATMENT SWALLOW 3 7/14/2025 Denise Crowell MS-CCC/SLP, VISH GN 1                 CAM Watson, VISH  7/14/2025

## 2025-07-14 NOTE — NURSING NOTE
Duration of Treatment 3.5 Hours   Access Site Tunneled Dialysis Catheter   Dialyzer Revaclear    mL/min   Dialysate Temperature (C) 36   BFR-As tolerated to a maximum of: 300 mL/min   Prime Dialyzer With NS to Priming Volume? Yes   Dialysate Solution Bath: K+ = 4 mEq, Ca = 2.5mEq   Bicarb Other   Bicarb Comments 37   Na+ Other   Na+ comments 139   Fluid Removal: 0.5L   Pt did not tolerate treatment well. Became lethargic approx 15 minutes into treatment. Dr García at bedside. Treatment goal/orders for bath changed. (Above reflects the changes). Dr Peñaloza came in to see pt for morning rounds. Pt did not seem to feel well. When asked she stated, my feet hurt. Approximately 15 minutes after treatment started, pt became increasingly lethargic. Please see flow sheet notes for times. BS 92, O2 96%. Pt became increasingly lethargic, staring into space. Stroke/RRT called. Pt had 1hr 36 minutes remaining in treatment. Per Dr García, ok to end treatment. Pt removed 400 ml, ran 1hr 54 min, BVP 35.6. Last bp 147/51.

## 2025-07-14 NOTE — PLAN OF CARE
Goal Outcome Evaluation:      Patient asked not to use the BiPAP therapy this evening.

## 2025-07-14 NOTE — PROGRESS NOTES
[facility]   Hospitalist Progress Note  Date: 2025  Patient Name: Charlette Rai  : 1935  MRN: [id number]  Date of admission: 2025    Subjective   Subjective     Chief Complaint:   Fall, weakness    Summary:   89-year-old female with a past medical history significant for peripheral arterial disease, renal artery stenosis status post stenting, hypertension, ESRD on HD, diastolic CHF, COPD, lung cancer on chemotherapy, type 2 diabetes mellitus, reportedly patient has been having significant diarrhea for several days, has not eaten for couple days, had been getting IV fluids with dialysis because of hypotension, patient was recently in the hospital from  to 7/3/2025 at that time she came in with complaint of hematemesis and worsening weakness.  Patient had EGD which showed esophagitis and gastritis.  Patient did receive a unit of blood at that time.  Patient was restarted on aspirin at discharge.  Of note on 2025 patient had a fall and acute periprosthetic femoral fracture and was sent to [facility] for repair.  In the emergency department the patient was found to be hypotensive, was 78% on room air, chest x-ray showed volume loss and haziness throughout the left hemithorax.  Of note patient does have a history of lung cancer and left upper lobe ectomy patient underwent CT scan of the head which was negative for any acute abnormalities however did have some chronic small vessel ischemic changes.  Patient was found to have UTI via UA.  Patient was admitted to the hospital for further workup and management.  Patient initially improved in her mental status, she was alert and oriented, complaining of pain, she did tolerate pain medications well. On the morning of 2025 patient more altered, drowsy, states she is just not feeling right.  There was some concern for some possible left upper extremity weakness although this was not noted by teleneurology as they felt the exam was  symmetric.  Stroke RRT was called and CTA was ordered and pending at this time.    Interval Followup:   Patient seen on dialysis, over the past 24 hours patient has had worsening of her mentation, more drowsy, patient was able to hold conversation during my exam however shortly thereafter patient was more altered and stroke RRT was called.    Objective   Objective     Vitals:   Temp:  [97.3 °F (36.3 °C)-99 °F (37.2 °C)] 97.4 °F (36.3 °C)  Heart Rate:  [72-99] 99  Resp:  [16-22] 22  BP: (115-163)/(40-89) 147/51  Flow (L/min) (Oxygen Therapy):  [1] 1    Physical Exam   GEN: No acute distress, resting comfortably in bed  HEENT: Moist mucous membranes  LUNGS: Equal chest rise bilaterally  CARDIAC: Regular rate and rhythm  NEURO: Moving all 4 extremities spontaneously  SKIN: Multiple skin excoriations    Result Review    Result Review:  I have personally reviewed the results as below  [x]  Laboratory CBC, CMP personally reviewed  CBC          7/12/2025    04:35 7/13/2025    04:40 7/14/2025    04:39   CBC   WBC 10.60  9.91  8.34    RBC 2.40  2.38  2.34    Hemoglobin 7.5  7.7  7.4    Hematocrit 25.7  27.3  25.5    .1  114.7  109.0    MCH 31.3  32.4  31.6    MCHC 29.2  28.2  29.0    RDW 19.4  19.4  19.2    Platelets 102  91  96      CMP          7/12/2025    04:35 7/13/2025    04:40 7/14/2025    04:39   CMP   Glucose 103  99  85    BUN 26.6  34.6  44.5    Creatinine 2.29  2.79  3.21    EGFR 20.1  15.9  13.4    Sodium 135  133  136    Potassium 3.8  4.1  3.8    Chloride 102  103  104    Calcium 7.6  7.4  7.5    Total Protein 4.7  4.8  4.9    Albumin 2.2  1.9  2.1    Globulin 2.5  2.9  2.8    Total Bilirubin 0.3  0.3  0.3    Alkaline Phosphatase 233  216  231    AST (SGOT) 120  82  60    ALT (SGPT) 46  41  38    Albumin/Globulin Ratio 0.9  0.7  0.8    BUN/Creatinine Ratio 11.6  12.4  13.9    Anion Gap 9.4  10.5  11.4      []  Microbiology  []  Radiology  []  EKG/Telemetry   []  Cardiology/Vascular   []  Pathology  []   Old records  []  Other:    Scheduled medications:  arformoterol, 15 mcg, Nebulization, BID - RT  aspirin, 81 mg, Oral, Daily  budesonide, 0.5 mg, Nebulization, BID - RT  [Held by provider] carvedilol, 6.25 mg, Oral, BID With Meals  [Held by provider] cetirizine, 10 mg, Oral, Daily  clopidogrel, 75 mg, Oral, Daily  epoetin mita/mita-epbx, 30,000 Units, Subcutaneous, Weekly  fluticasone, 1 spray, Nasal, Daily  gabapentin, 100 mg, Oral, BID  insulin regular, 2-7 Units, Subcutaneous, Q6H  [Held by provider] isosorbide mononitrate, 30 mg, Oral, Nightly  levothyroxine, 50 mcg, Oral, Q AM  [Held by provider] losartan, 25 mg, Oral, Q24H  pantoprazole, 40 mg, Oral, BID AC  rosuvastatin, 10 mg, Oral, Nightly  sodium chloride, 10 mL, Intravenous, Q12H  sodium chloride, 10 mL, Intravenous, Q12H  tobramycin PF, 300 mg, Nebulization, Q12H - RT      As needed medications:    acetaminophen **OR** acetaminophen **OR** acetaminophen    senna-docusate sodium **AND** polyethylene glycol **AND** bisacodyl **AND** bisacodyl    dextrose    dextrose    glucagon (human recombinant)    heparin (porcine)    hydrALAZINE    HYDROmorphone    ipratropium-albuterol    Morphine    ondansetron ODT **OR** ondansetron    oxyCODONE-acetaminophen    sodium chloride    sodium chloride    sodium chloride    sodium chloride    sodium chloride  Infusions:            Assessment & Plan   Assessment / Plan     Assessment:  UTI with altered mental status  Peripheral vascular disease and renal artery stenosis status post PTA and stent on 6/14/2024  Hypotension  Anemia  Diarrhea  End-stage renal disease on hemodialysis  Severe protein calorie malnutrition  Chronic diastolic heart failure not in acute exacerbation  Hypothyroidism  Chronic hypoxemia on home oxygen  Severe protein calorie malnutrition  Recent femoral fracture with repair at [facility]    Plan:  Patient admitted to the hospital for further workup and management of above  Completed cefepime, will continue  to monitor mental status off as this may have contributed to patient's change in mental status  Checking CTA head and neck today  Transfer to stroke floor  Start [facility]  Teleneurology consulted  Cultures reviewed and negative  Discontinue Dilaudid as this may be contributing to patient's altered mental status  Completed IV fluids continue to monitor volume status off  Wide pulse pressure likely secondary to aortic stenosis as noted on recent echocardiogram  C. difficile negative  FOBT still pending no bowel movements noted  Restarted aspirin and Plavix and monitor hemoglobin closely, 7/13/2025  Continue Protonix  Enteric panel and bacterial stool panel ordered and still pending  Nephrology consulted for dialysis needs  Nutritional consult for severe protein calorie malnutrition  Resume appropriate home medications  CBC, CMP reviewed 7/14/2025   Okay to remove staples from lower extremity  Repeat CBC, CMP, mag and Phos in a.m. 7/14/2025      Reviewed patient's labs and imaging, and discussed with patient and nurse at bedside.    VTE Prophylaxis:  Pharmacologic & mechanical VTE prophylaxis orders are present.    Time spent: Time spent involving patient care including face-to-face encounter 51 minutes    CODE STATUS:   Code Status (Patient has no pulse and is not breathing): CPR (Attempt to Resuscitate)  Medical Interventions (Patient has pulse or is breathing): Full Support      Electronically signed by Boom Peñaloza MD, 7/14/2025, 11:56 EDT.

## 2025-07-14 NOTE — SIGNIFICANT NOTE
07/14/25 1000   Physical Therapy Time and Intention   Session Not Performed patient unavailable for evaluation  (out of the room for dialysis)

## 2025-07-14 NOTE — CONSULTS
[facility]  [facility]      Patient Name:   Charlette Rai  YOB: 1935  Identification Number:   MRN - [id number]  Date of Service:   07/14/2025 10:30:40    Diagnosis:        R53.1 - Weakness        R41.0 - Disorientation, unspecified    Impression:       This is an 89-year-old female with multiple risk factors end-stage renal disease with disorientation and weakness getting worse after dialysis.      Not a candidate for IV thrombolytics as had GI bleed within the last 2 weeks.      CTA of the head and neck without any LVO so not a candidate for thrombectomy. Patient with significant stenosis in the left common carotid artery, but no occlusion. At this point, recommend encephalopathy workup as symptoms seem more generalized and potentially alternative to cefepime as it can cause issues in the renally impaired patients in terms of encephalopathy. Check MRI brain without contrast as well for completeness given her vascular symptoms.      Thanks for the consultation.    Our recommendations are outlined below.    Recommendations:          Stroke/Telemetry Floor        Neuro Checks (Q2)        Bedside Swallow Eval        DVT Prophylaxis        IV Fluids, Normal Saline        Head of Bed 30 Degrees        Euglycemia and Avoid Hyperthermia (PRN Acetaminophen)    Sign Out:        Discussed with Primary Attending        ------------------------------------------------------------------------------    Advanced Imaging:  CTA Head and Neck Completed.    LVO:No    Patient is not a candidate for FRDERICK      Metrics:  Last Known Well: 07/14/2025 08:05:00  Dispatch Time: 07/14/2025 10:30:16  Initial Response Time: 07/14/2025 10:42:09  Symptoms: Worsening altered mental status.  Initial patient interaction: 07/14/2025 10:50:01  NIHSS Assessment Completed: 07/14/2025 10:57:45  Patient is not a candidate for Thrombolytic.  Thrombolytic Medical Decision: 07/14/2025  10:58:04  Patient was not deemed candidate for Thrombolytic because of following reasons:  Recent gastrointestinal or urinary tract hemorrhage (within previous 21 days) .    CT Head:  CT head unremarkable for acute infarction or hemorrhage per Radiology: IMPRESSION:  Impression:    1. No intracranial hemorrhage.  2. No CT changes of acute major vascular territory brain ischemia at this time    Electronically Signed: Karlo Andrea  7/14/2025 10:56 AM EDT      Primary Provider Notified of Diagnostic Impression and Management Plan on: 07/14/2025 11:09:22  Spoke With: Boom Peñaloza MD [phone]  Able to Reach  07/14/2025 11:09:22        ------------------------------------------------------------------------------    History of Present Illness:  Patient is a 89 year old Female.    Inpatient stroke alert was called for symptoms of Worsening altered mental status.  This is a 89-year-old female presenting to the hospital complaints of worsening altered mental status, fall with left hip fracture as well as end-stage renal disease presenting with generalized weakness. The patient has been having hypotension and lethargy for the last couple of days. Overall, not really been doing well. Today, she was alert and oriented to person though not to place or date. She went to dialysis and became more lethargic and nonresponsive. Stroke alert was finally called for further evaluation.      Past Medical History:       Hypertension       Hyperlipidemia  Other PMH:  End-stage renal disease   History of hospitalization from 6/24 to 7/3 secondary to hematemesis and worsening weakness requiring blood.    Medications:    No Anticoagulant use   Antiplatelet use: Yes Aspirin and Plavix though Plavix is held currently  Reviewed EMR for current medications    Allergies:   Reviewed    Social History:  Drug Use: No    Family History:    There is no family history of premature cerebrovascular disease pertinent to this consultation    ROS :  ROS Cannot Be Obtained Because:  Patient Is Confused    Past Surgical History:  There Is No Surgical History Contributory To Today’s Visit        Examination:  BP(147/51), Pulse(99),  1A: Level of Consciousness - Requires repeated stimulation to arouse + 2  1B: Ask Month and Age - Both Questions Right + 0  1C: Blink Eyes & Squeeze Hands - Performs Both Tasks + 0  2: Test Horizontal Extraocular Movements - Normal + 0  3: Test Visual Fields - No Visual Loss + 0  4: Test Facial Palsy (Use Grimace if Obtunded) - Normal symmetry + 0  5A: Test Left Arm Motor Drift - Drift, but doesn't hit bed + 1  5B: Test Right Arm Motor Drift - Drift, but doesn't hit bed + 1  6A: Test Left Leg Motor Drift - No Effort Against Gravity + 3  6B: Test Right Leg Motor Drift - No Effort Against Gravity + 3  7: Test Limb Ataxia (FNF/Heel-Shin) - No Ataxia + 0  8: Test Sensation - Normal; No sensory loss + 0  9: Test Language/Aphasia - Normal; No aphasia + 0  10: Test Dysarthria - Mild-Moderate Dysarthria: Slurring but can be understood + 1  11: Test Extinction/Inattention - No abnormality + 0    NIHSS Score: 11      Pre-Morbid Modified Randolph Scale:  3 Points = Moderate disability; requiring some help, but able to walk without assistance    Spoke with : Boom Peñaloza MD [phone]  I reviewed the available imaging via [facility] and initiated discussion with the primary provider    This consult was conducted in real time using interactive audio and video technology. Patient was informed of the technology being used for this visit and agreed to proceed. Patient located in hospital and provider located at home/office setting.      Patient is being evaluated for possible acute neurologic impairment and high probability of imminent or life-threatening deterioration. I spent total of 40 minutes providing care to this patient, including time for face to face visit via telemedicine, review of medical records, imaging studies and discussion of  findings with providers, the patient and/or family.      Dr Ian Regan      [facility]  For Inpatient follow-up with [facility] physician please call [facility] at [phone]. As we are not an outpatient service for any post hospital discharge needs please contact the hospital for assistance.  If you have any questions for the [facility] physicians or need to reconsult for clinical or diagnostic changes please contact us via [facility] at [phone].

## 2025-07-14 NOTE — PLAN OF CARE
Goal Outcome Evaluation:  Plan of Care Reviewed With: patient, family        Progress: improving  Outcome Evaluation: Transfered to PCU from 4NT after stroke RRT this shift. NIH- 6. Mentation improved, A&O x4. Given tylenol x1 for pain. MRI screening form complete. Wound care complete and sutures removed per MD order. VSS.

## 2025-07-14 NOTE — THERAPY EVALUATION
Acute Care - Physical Therapy Initial Evaluation  MAURICE Escamilla     Patient Name: Charlette Rai  : 1935  MRN: [id number]  Today's Date: 2025      Visit Dx:     ICD-10-CM ICD-9-CM   1. Acute UTI  N39.0 599.0   2. Altered mental status, unspecified altered mental status type  R41.82 780.97   3. Oropharyngeal dysphagia  R13.12 787.22   4. Decreased activities of daily living (ADL)  Z78.9 V49.89   5. Difficulty walking  R26.2 719.7     Patient Active Problem List   Diagnosis    Malignant neoplasm of upper lobe of right lung    Rheumatoid arthritis    Asthma    Chronic heart failure with preserved ejection fraction    Essential hypertension    GERD (gastroesophageal reflux disease)    Hyperlipidemia LDL goal <70    Lumbar spinal stenosis    Migraine    Monoclonal paraproteinemia    Osteopenia    Oxygen dependent    Peripheral neuropathy    Stage 4 chronic kidney disease    Vitamin D deficiency    Carotid artery stenosis    Chronic right-sided thoracic back pain    Peripheral vascular disease of lower extremity    Ex-smoker    De Quervain's tenosynovitis    Arthritis of carpometacarpal (CMC) joint of right thumb    Steal syndrome of dialysis vascular access    Anemia of chronic renal failure, stage 4 (severe)    Aortic stenosis, moderate    Hematoma    End stage renal disease on dialysis    Coronary artery calcification seen on CT scan    Frailty syndrome in geriatric patient    COPD with lower respiratory infection    Coagulation defect, unspecified    Hyperphosphatemia    Hypothyroidism (acquired)    Idiopathic osteoarthritis    Secondary multiple arthritis    Type 2 diabetes mellitus with diabetic peripheral angiopathy without gangrene    Renal artery stenosis    S/P renal artery angioplasty    Atherosclerosis of renal artery    Abnormal serum immunoelectrophoresis    NSVT (nonsustained ventricular tachycardia)    Hypertensive heart and chronic kidney disease with heart failure and with stage 5 chronic  kidney disease, or end stage renal disease    Unspecified protein-calorie malnutrition    IFG (impaired fasting glucose)    Iron deficiency anemia    AV graft malfunction, initial encounter    AV graft malfunction    Weakness    Malignant neoplasm of lower lobe of left lung    Malignant neoplasm of lower lobe, right bronchus or lung    Severe protein-calorie malnutrition    UTI (urinary tract infection)     Past Medical History:   Diagnosis Date    Allergic rhinitis     Anaphylactic shock, unspecified, initial encounter 12/05/2023    Anemia     Arthritis     Asthma     USE INHALERS AND NEBULIZERS    Back pain     Bladder disorder     Cancer     LEFT LUNG CANCER 2005 SURGERY AND CHEMO DONE  AND CURRENTLY 4/ 14/22  RIGHT LUNG CANCER HAS ONLY RECEIVED RADIATION THUS FAR    Chronic kidney disease     stage 3    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 DENIES CP BUT DOES HAVE SOA CHRONIC ISSUE COPD/LUNG CANCER    COPD (chronic obstructive pulmonary disease)     FOLLOWED BY DR MARIAJOSE BAILEY    Coronary artery disease     DENIES CP BUT DOES GET SOA MOST OF THE TIME WITH EXERTION BUT OCC AT REST CHRONIC ISSUE COPD/LUNG CANCER    Deep vein thrombosis     Diabetes mellitus     DOES NOT CHECK BS DAILY    Disease of thyroid gland     HYPOTHYROIDISM    Essential hypertension     Gastric ulcer     GERD (gastroesophageal reflux disease)     Heart murmur     History of transfusion     NO ISSUES POST TRANSFUSION WAS MANY YEARS AGO    Hyperlipemia     Leukocytopenia     FOLLOWED BY DR MIR BAILEY    Limb swelling     Lumbago     Lumbar spinal stenosis     Lung cancer     Migraine headache     Multiple joint pain     On home oxygen therapy     4L/NC PRN    Osteopenia     PNA (pneumonia) 05/04/2024    Pseudomonas pneumonia 04/29/2024    Reflux esophagitis     Shortness of  breath     Thyroid nodule     HAS ULTRASOUND YEARLY BEING MONITORED    Vascular disease     Vitamin D deficiency      Past Surgical History:   Procedure Laterality Date    ABDOMINAL SURGERY      ANGIOPLASTY RENAL ARTERY Left 06/14/2024    stent placement    APPENDECTOMY      ARTERIOGRAM N/A 6/14/2024    Procedure: Arteriogram, right radial access, aortogram and renal arteriogram with possible intervention.;  Surgeon: Dio Miguel MD;  Location: [facility] CATH INVASIVE LOCATION;  Service: Peripheral Vascular;  Laterality: N/A;    ARTERIOVENOUS FISTULA/SHUNT SURGERY Left 05/10/2022    Procedure: Left basilic vein transposition;  Surgeon: Wan Barksdale MD;  Location: [facility] MAIN OR;  Service: Vascular;  Laterality: Left;    ARTERIOVENOUS FISTULA/SHUNT SURGERY Left 03/23/2023    Procedure: Ligation of left arm arteriovenous fistula;  Surgeon: Wan Barksdale MD;  Location: [facility] MAIN OR;  Service: Vascular;  Laterality: Left;    ARTERIOVENOUS FISTULA/SHUNT SURGERY Left 11/30/2023    Procedure: Creation of left arm arteriovenous graft;  Surgeon: Wan Barksdale MD;  Location: [facility] MAIN OR;  Service: Vascular;  Laterality: Left;    BRONCHOSCOPY N/A 04/24/2024    Procedure: BRONCHOSCOPY WITH BAL AND WASHINGS;  Surgeon: Khalif Yanez MD;  Location: [facility] ENDOSCOPY;  Service: Pulmonary;  Laterality: N/A;  MUCUS PLUGGING    CARDIAC CATHETERIZATION  1996    CARDIAC SURGERY      CARDIAC SURGERY      fluid drained from heart    CATARACT EXTRACTION, BILATERAL  2003    COLONOSCOPY  2014    ENDOSCOPY  2016    2019    ENDOSCOPY N/A 6/25/2025    Procedure: ESOPHAGOGASTRODUODENOSCOPY;  Surgeon: Tanvi Del Valle MD;  Location: [facility] ENDOSCOPY;  Service: Gastroenterology;  Laterality: N/A;  gastritis, esophagitis    FEMORAL ARTERY STENT Bilateral     HYSTERECTOMY      LEG THROMBECTOMY/EMBOLECTOMY Left 1/29/2025    Procedure: LEFT LOWER EXTREMITY ANGIOGRAM;  Surgeon: Jaden Castañeda MD;  Location: [facility] HYBRID OR;  Service:  Vascular;  Laterality: Left;    LUNG BIOPSY Left 2005    lobectomy upper lung caner    LUNG VOLUME REDUCTION      OTHER SURGICAL HISTORY      artifical joints/limbs    REPLACEMENT TOTAL KNEE Left 2016    SHUNT O GRAM N/A 1/13/2025    Procedure: dialysis shuntogram;  Surgeon: Court Valente MD;  Location: [facility] INVASIVE LOCATION;  Service: Peripheral Vascular;  Laterality: N/A;    UPPER GASTROINTESTINAL ENDOSCOPY       PT Assessment (Last 12 Hours)       PT Evaluation and Treatment       Row Name 07/14/25 1400 07/14/25 1000       Physical Therapy Time and Intention    Document Type evaluation  -AV --    Mode of Treatment individual therapy;physical therapy  -AV --    Session Not Performed -- patient unavailable for evaluation  out of the room for dialysis  -CS      Row Name 07/14/25 1400          General Information    Patient Profile Reviewed yes  -AV     Prior Level of Function --  Typically (I) with ADLs. Ambulated with RW. Recently underwen L femur fracture at [facility] 6/7/25. Wears 3.5 L O2 continuously. Was at [facility] for rehab.  -AV     Equipment Currently Used at Home oxygen;walker, rolling  -AV     Existing Precautions/Restrictions fall;weight bearing  WBAT LLE  -AV       Row Name 07/14/25 1400          Living Environment    Current Living Arrangements home  -AV     Home Accessibility stairs to enter home  -AV     People in Home --  Typically lives with spouse but has been in rehab since repair of L femur fx  -AV       Row Name 07/14/25 1400          Home Main Entrance    Number of Stairs, Main Entrance three  Daughter at bedside also reports a ramp is available  -AV     Stair Railings, Main Entrance railings on both sides of stairs  -AV       Row Name 07/14/25 1400          Pain    Pre/Posttreatment Pain Comment Pain c/o right leg pain with attempt to sit EOB. Nurse present in room  -AV       Row Name 07/14/25 1400          Range of Motion (ROM)    Range of Motion bilateral lower  extremities;ROM is WFL  -AV       Row Name 07/14/25 1400          Strength (Manual Muscle Testing)    Strength (Manual Muscle Testing) bilateral lower extremities  2-/5  -AV       Row Name 07/14/25 1400          Bed Mobility    Bed Mobility supine-sit;sit-supine  -AV     Supine-Sit [place] (Bed Mobility) maximum assist (25% patient effort)  -AV     Sit-Supine [place] (Bed Mobility) maximum assist (25% patient effort)  -AV     Comment, (Bed Mobility) Max assist to maintain sitting EOB due to posterior lean and c/o RLE pain  -AV       Row Name 07/14/25 1400          Transfers    Comment, (Transfers) Deferred for safety  -AV       Row Name 07/14/25 1400          Safety Issues/Impairments Affecting Functional Mobility    Impairments Affecting Function (Mobility) balance;endurance/activity tolerance;pain;strength  -AV       Row Name 07/14/25 1400          Balance    Balance Assessment sitting static balance  -AV     Static Sitting Balance maximum assist  -AV     Position, Sitting Balance supported;sitting edge of bed  -AV       Row Name             Wound Right posterior heel Pressure Injury    Wound - Properties Group Present on Original Admission: Y  -JT Side: Right  -JT Orientation: posterior  -JT Location: heel  -JT Primary Wound Type: Pressure Inj  -JT    Retired Wound - Properties Group Present on Original Admission: Y  -JT Side: Right  -JT Orientation: posterior  -JT Location: heel  -JT    Retired Wound - Properties Group Present on Original Admission: Y  -JT Side: Right  -JT Orientation: posterior  -JT Location: heel  -JT    Retired Wound - Properties Group Present on Original Admission: Y  -JT Side: Right  -JT Location: heel  -JT      Row Name             Wound Left lower leg Traumatic Abrasion    Wound - Properties Group Present on Original Admission: Y  -JT Side: Left  -JT Orientation: lower  -JT Location: leg  -JT Primary Wound Type: Traumatic  -JT Secondary Wound Type - Traumatic: Abrasion  -JT     Retired Wound - Properties Group Present on Original Admission: Y  -JT Side: Left  -JT Orientation: lower  -JT Location: leg  -JT    Retired Wound - Properties Group Present on Original Admission: Y  -JT Side: Left  -JT Orientation: lower  -JT Location: leg  -JT    Retired Wound - Properties Group Present on Original Admission: Y  -JT Side: Left  -JT Location: leg  -JT      Row Name             Wound Left anterior knee Surgical Closed Surgical Incision    Wound - Properties Group Present on Original Admission: Y  -JT Side: Left  -JT Orientation: anterior  -JT Location: knee  -JT Primary Wound Type: Surgical  -JT Secondary Wound Type - Surgical: Closed Surgi  -JT    Retired Wound - Properties Group Present on Original Admission: Y  -JT Side: Left  -JT Orientation: anterior  -JT Location: knee  -JT    Retired Wound - Properties Group Present on Original Admission: Y  -JT Side: Left  -JT Orientation: anterior  -JT Location: knee  -JT    Retired Wound - Properties Group Present on Original Admission: Y  -JT Side: Left  -JT Location: knee  -JT      Row Name             Wound Left anterior greater trochanter Surgical Closed Surgical Incision    Wound - Properties Group Present on Original Admission: Y  -JT Side: Left  -JT Orientation: anterior  -JT Location: greater trochanter  -JT Primary Wound Type: Surgical  -JT Secondary Wound Type - Surgical: Closed Surgi  -JT    Retired Wound - Properties Group Present on Original Admission: Y  -JT Side: Left  -JT Orientation: anterior  -JT Location: greater trochanter  -JT    Retired Wound - Properties Group Present on Original Admission: Y  -JT Side: Left  -JT Orientation: anterior  -JT Location: greater trochanter  -JT    Retired Wound - Properties Group Present on Original Admission: Y  -JT Side: Left  -JT Location: greater trochanter  -JT      Row Name             Wound 07/10/25 0038 coccyx    Wound - Properties Group Placement Date: 07/10/25  -MA Placement Time: 0038 -MA  Location: [facility]    Retired Wound - Properties Group Placement Date: 07/10/25  -MA Placement Time: 0038 -MA Location: [facility]    Retired Wound - Properties Group Placement Date: 07/10/25  -MA Placement Time: 0038 -MA Location: [facility]  -MA    Retired Wound - Properties Group Date first assessed: 07/10/25  -MA Time first assessed: 0038 -MA Location: [facility]      Row Name 07/14/25 1400          Plan of Care Review    Plan of Care Reviewed With patient;family  -AV     Progress no change  -AV     Outcome Evaluation Patient presents with deficits in balance, endurance, transfers, and ambulation. Patient will benefit from skilled PT services to address these mobility deficits and decrease risk of falls.  -AV       Row Name 07/14/25 1400          Vital Signs    O2 Delivery Pre Treatment nasal cannula  1 L  -AV     O2 Delivery Intra Treatment nasal cannula  1 L  -AV     O2 Delivery Post Treatment nasal cannula  1 L  -AV       Row Name 07/14/25 1400          Positioning and Restraints    Pre-Treatment Position in bed  -AV     Post Treatment Position bed  -AV     In Bed side lying left;call light within reach;encouraged to call for assist;exit alarm on;with family/caregiver  -AV       Row Name 07/14/25 1400          Therapy Assessment/Plan (PT)    Rehab Potential (PT) good  -AV     Criteria for Skilled Interventions Met (PT) yes;meets criteria  -AV     Therapy Frequency (PT) daily  -AV     Predicted Duration of Therapy Intervention (PT) 10 days  -AV     Problem List (PT) problems related to;balance;mobility;strength;pain  -AV     Activity Limitations Related to Problem List (PT) unable to transfer safely;unable to ambulate safely  -AV       Row Name 07/14/25 1400          PT Evaluation Complexity    History, PT Evaluation Complexity 1-2 personal factors and/or comorbidities  -AV     Examination of Body Systems (PT Eval Complexity) total of 4 or more elements  -AV     Clinical Presentation (PT Evaluation  Complexity) stable  -AV     Clinical Decision Making (PT Evaluation Complexity) low complexity  -AV     Overall Complexity (PT Evaluation Complexity) low complexity  -AV       Row Name 07/14/25 1400          Therapy Plan Review/Discharge Plan (PT)    Therapy Plan Review (PT) evaluation/treatment results reviewed;patient  -AV       Row Name 07/14/25 1400          Physical Therapy Goals    Bed Mobility Goal Selection (PT) bed mobility, PT goal 1  -AV     Transfer Goal Selection (PT) transfer, PT goal 1  -AV     Gait Training Goal Selection (PT) gait training, PT goal 1  -AV       Row Name 07/14/25 1400          Bed Mobility Goal 1 (PT)    Activity/Assistive Device (Bed Mobility Goal 1, PT) sit to supine/supine to sit  -AV     [place] Level/Cues Needed (Bed Mobility Goal 1, PT) contact guard required  -AV     Time Frame (Bed Mobility Goal 1, PT) 10 days  -AV       Row Name 07/14/25 1400          Transfer Goal 1 (PT)    Activity/Assistive Device (Transfer Goal 1, PT) sit-to-stand/stand-to-sit;bed-to-chair/chair-to-bed;walker, rolling  -AV     [place] Level/Cues Needed (Transfer Goal 1, PT) minimum assist (75% or more patient effort)  -AV     Time Frame (Transfer Goal 1, PT) 10 days  -AV       Row Name 07/14/25 1400          Gait Training Goal 1 (PT)    Activity/Assistive Device (Gait Training Goal 1, PT) gait (walking locomotion);assistive device use;walker, rolling  -AV     [place] Level (Gait Training Goal 1, PT) minimum assist (75% or more patient effort)  -AV     Distance (Gait Training Goal 1, PT) 50  -AV     Time Frame (Gait Training Goal 1, PT) 10 days  -AV               User Key  (r) = Recorded By, (t) = Taken By, (c) = Cosigned By      Initials Name Provider Type    Porsche Majano, PT Physical Therapist    AV Abdi Odom, PT Physical Therapist    Padmaja Soto, RN Registered Nurse    Lilliam Smith, RN Registered Nurse                    Physical Therapy Education        Title: PT OT SLP Therapies (In Progress)       Topic: Physical Therapy (In Progress)       Point: Mobility training (In Progress)       Learning Progress Summary            Patient Acceptance, E,TB, NR by AV at 7/14/2025 1444                      Point: Home exercise program (Not Started)       Learner Progress:  Not documented in this visit.              Point: Body mechanics (In Progress)       Learning Progress Summary            Patient Acceptance, E,TB, NR by AV at 7/14/2025 1444                      Point: Precautions (In Progress)       Learning Progress Summary            Patient Acceptance, E,TB, NR by AV at 7/14/2025 1444                                      User Key       Initials Effective Dates Name Provider Type Discipline    AV 06/11/21 -  Abdi Odom, PT Physical Therapist PT                  PT Recommendation and Plan  Anticipated Discharge Disposition (PT): sub acute care setting  Planned Therapy Interventions (PT): balance training, bed mobility training, gait training, home exercise program, neuromuscular re-education, strengthening, transfer training  Therapy Frequency (PT): daily  Plan of Care Reviewed With: patient, family  Progress: no change  Outcome Evaluation: Patient presents with deficits in balance, endurance, transfers, and ambulation. Patient will benefit from skilled PT services to address these mobility deficits and decrease risk of falls.   Outcome Measures       Row Name 07/14/25 1400             How much help from another person do you currently need...    Turning from your back to your side while in flat bed without using bedrails? 2  -AV      Moving from lying on back to sitting on the side of a flat bed without bedrails? 2  -AV      Moving to and from a bed to a chair (including a wheelchair)? 2  -AV      Standing up from a chair using your arms (e.g., wheelchair, bedside chair)? 2  -AV      Climbing 3-5 steps with a railing? 1  -AV      To walk in hospital room? 1  -AV       AM-PAC 6 Clicks Score (PT) 10  -AV         Functional Assessment    Outcome Measure Options AM-PAC 6 Clicks Basic Mobility (PT)  -AV                User Key  (r) = Recorded By, (t) = Taken By, (c) = Cosigned By      Initials Name Provider Type    AV Abdi Odom, PT Physical Therapist                     Time Calculation:    PT Charges       Row Name 07/14/25 1442 07/14/25 1020          Time Calculation    PT Received On 07/14/25  -AV 07/14/25  -     PT Goal Re-Cert Due Date 07/23/25  -AV --        Untimed Charges    PT Eval/Re-eval Minutes 35  -AV --        Total Minutes    Untimed Charges Total Minutes 35  -AV --      Total Minutes 35  -AV --               User Key  (r) = Recorded By, (t) = Taken By, (c) = Cosigned By      Initials Name Provider Type    Porsche Majano, PT Physical Therapist    AV Abdi Odom, PT Physical Therapist                  Therapy Charges for Today       Code Description Service Date Service Provider Modifiers Qty    [id number] HC PT EVAL LOW COMPLEXITY 3 7/14/2025 Abdi Odom, PT GP 1            PT G-Codes  Outcome Measure Options: AM-PAC 6 Clicks Basic Mobility (PT)  AM-PAC 6 Clicks Score (PT): 10  AM-PAC 6 Clicks Score (OT): 9    Abdi Odom, PT  7/14/2025

## 2025-07-15 PROBLEM — R41.82 AMS (ALTERED MENTAL STATUS): Status: RESOLVED | Noted: 2025-07-15 | Resolved: 2025-07-15

## 2025-07-15 PROBLEM — R41.82 AMS (ALTERED MENTAL STATUS): Status: ACTIVE | Noted: 2025-07-15

## 2025-07-15 PROBLEM — N39.0 UTI (URINARY TRACT INFECTION): Status: RESOLVED | Noted: 2025-07-09 | Resolved: 2025-07-15

## 2025-07-15 LAB
ALBUMIN SERPL-MCNC: 2.3 G/DL (ref 3.5–5.2)
ALBUMIN/GLOB SERPL: 0.8 G/DL
ALP SERPL-CCNC: 243 U/L (ref 39–117)
ALT SERPL W P-5'-P-CCNC: 42 U/L (ref 1–33)
ANION GAP SERPL CALCULATED.3IONS-SCNC: 7.3 MMOL/L (ref 5–15)
AST SERPL-CCNC: 68 U/L (ref 1–32)
BACTERIA SPEC AEROBE CULT: NORMAL
BACTERIA SPEC AEROBE CULT: NORMAL
BASOPHILS # BLD AUTO: 0.09 10*3/MM3 (ref 0–0.2)
BASOPHILS NFR BLD AUTO: 1 % (ref 0–1.5)
BILIRUB SERPL-MCNC: 0.4 MG/DL (ref 0–1.2)
BUN SERPL-MCNC: 34.2 MG/DL (ref 8–23)
BUN/CREAT SERPL: 13.3 (ref 7–25)
CALCIUM SPEC-SCNC: 8 MG/DL (ref 8.6–10.5)
CHLORIDE SERPL-SCNC: 100 MMOL/L (ref 98–107)
CHOLEST SERPL-MCNC: 71 MG/DL (ref 0–200)
CO2 SERPL-SCNC: 23.7 MMOL/L (ref 22–29)
CREAT SERPL-MCNC: 2.57 MG/DL (ref 0.57–1)
DEPRECATED RDW RBC AUTO: 79 FL (ref 37–54)
EGFRCR SERPLBLD CKD-EPI 2021: 17.5 ML/MIN/1.73
EOSINOPHIL # BLD AUTO: 0.2 10*3/MM3 (ref 0–0.4)
EOSINOPHIL NFR BLD AUTO: 2.3 % (ref 0.3–6.2)
ERYTHROCYTE [DISTWIDTH] IN BLOOD BY AUTOMATED COUNT: 19.3 % (ref 12.3–15.4)
GLOBULIN UR ELPH-MCNC: 3 GM/DL
GLUCOSE BLDC GLUCOMTR-MCNC: 113 MG/DL (ref 70–99)
GLUCOSE BLDC GLUCOMTR-MCNC: 74 MG/DL (ref 70–99)
GLUCOSE BLDC GLUCOMTR-MCNC: 89 MG/DL (ref 70–99)
GLUCOSE BLDC GLUCOMTR-MCNC: 97 MG/DL (ref 70–99)
GLUCOSE SERPL-MCNC: 81 MG/DL (ref 65–99)
HBA1C MFR BLD: 4.4 % (ref 4.8–5.6)
HCT VFR BLD AUTO: 27.1 % (ref 34–46.6)
HDLC SERPL-MCNC: 44 MG/DL (ref 40–60)
HGB BLD-MCNC: 7.7 G/DL (ref 12–15.9)
IMM GRANULOCYTES # BLD AUTO: 0.08 10*3/MM3 (ref 0–0.05)
IMM GRANULOCYTES NFR BLD AUTO: 0.9 % (ref 0–0.5)
LDLC SERPL CALC-MCNC: 13 MG/DL (ref 0–100)
LDLC/HDLC SERPL: 0.36 {RATIO}
LYMPHOCYTES # BLD AUTO: 0.5 10*3/MM3 (ref 0.7–3.1)
LYMPHOCYTES NFR BLD AUTO: 5.7 % (ref 19.6–45.3)
MAGNESIUM SERPL-MCNC: 1.9 MG/DL (ref 1.6–2.4)
MCH RBC QN AUTO: 31.7 PG (ref 26.6–33)
MCHC RBC AUTO-ENTMCNC: 28.4 G/DL (ref 31.5–35.7)
MCV RBC AUTO: 111.5 FL (ref 79–97)
MONOCYTES # BLD AUTO: 0.84 10*3/MM3 (ref 0.1–0.9)
MONOCYTES NFR BLD AUTO: 9.5 % (ref 5–12)
NEUTROPHILS NFR BLD AUTO: 7.09 10*3/MM3 (ref 1.7–7)
NEUTROPHILS NFR BLD AUTO: 80.6 % (ref 42.7–76)
NRBC BLD AUTO-RTO: 0 /100 WBC (ref 0–0.2)
PHOSPHATE SERPL-MCNC: 3.3 MG/DL (ref 2.5–4.5)
PLATELET # BLD AUTO: 98 10*3/MM3 (ref 140–450)
PMV BLD AUTO: 10 FL (ref 6–12)
POTASSIUM SERPL-SCNC: 4.2 MMOL/L (ref 3.5–5.2)
PROT SERPL-MCNC: 5.3 G/DL (ref 6–8.5)
RBC # BLD AUTO: 2.43 10*6/MM3 (ref 3.77–5.28)
SODIUM SERPL-SCNC: 131 MMOL/L (ref 136–145)
TRIGL SERPL-MCNC: 56 MG/DL (ref 0–150)
VLDLC SERPL-MCNC: 14 MG/DL (ref 5–40)
WBC NRBC COR # BLD AUTO: 8.8 10*3/MM3 (ref 3.4–10.8)

## 2025-07-15 PROCEDURE — 94799 UNLISTED PULMONARY SVC/PX: CPT

## 2025-07-15 PROCEDURE — 80061 LIPID PANEL: CPT | Performed by: INTERNAL MEDICINE

## 2025-07-15 PROCEDURE — 85025 COMPLETE CBC W/AUTO DIFF WBC: CPT | Performed by: INTERNAL MEDICINE

## 2025-07-15 PROCEDURE — 82948 REAGENT STRIP/BLOOD GLUCOSE: CPT

## 2025-07-15 PROCEDURE — 80053 COMPREHEN METABOLIC PANEL: CPT | Performed by: INTERNAL MEDICINE

## 2025-07-15 PROCEDURE — 83036 HEMOGLOBIN GLYCOSYLATED A1C: CPT | Performed by: INTERNAL MEDICINE

## 2025-07-15 PROCEDURE — 99232 SBSQ HOSP IP/OBS MODERATE 35: CPT | Performed by: PSYCHIATRY & NEUROLOGY

## 2025-07-15 PROCEDURE — 83735 ASSAY OF MAGNESIUM: CPT | Performed by: INTERNAL MEDICINE

## 2025-07-15 PROCEDURE — 94761 N-INVAS EAR/PLS OXIMETRY MLT: CPT

## 2025-07-15 PROCEDURE — 99232 SBSQ HOSP IP/OBS MODERATE 35: CPT | Performed by: INTERNAL MEDICINE

## 2025-07-15 PROCEDURE — 94660 CPAP INITIATION&MGMT: CPT

## 2025-07-15 PROCEDURE — 94664 DEMO&/EVAL PT USE INHALER: CPT

## 2025-07-15 PROCEDURE — 97110 THERAPEUTIC EXERCISES: CPT

## 2025-07-15 PROCEDURE — 84100 ASSAY OF PHOSPHORUS: CPT | Performed by: INTERNAL MEDICINE

## 2025-07-15 RX ORDER — OXYCODONE AND ACETAMINOPHEN 5; 325 MG/1; MG/1
1 TABLET ORAL EVERY 12 HOURS PRN
Status: DISCONTINUED | OUTPATIENT
Start: 2025-07-15 | End: 2025-07-16

## 2025-07-15 RX ADMIN — CLOPIDOGREL BISULFATE 75 MG: 75 TABLET, FILM COATED ORAL at 08:22

## 2025-07-15 RX ADMIN — ASPIRIN 81 MG: 81 TABLET, COATED ORAL at 08:22

## 2025-07-15 RX ADMIN — Medication 10 ML: at 08:22

## 2025-07-15 RX ADMIN — PANTOPRAZOLE SODIUM 40 MG: 40 TABLET, DELAYED RELEASE ORAL at 17:20

## 2025-07-15 RX ADMIN — Medication 10 ML: at 20:50

## 2025-07-15 RX ADMIN — PANTOPRAZOLE SODIUM 40 MG: 40 TABLET, DELAYED RELEASE ORAL at 08:22

## 2025-07-15 RX ADMIN — ARFORMOTEROL TARTRATE 15 MCG: 15 SOLUTION RESPIRATORY (INHALATION) at 20:55

## 2025-07-15 RX ADMIN — ARFORMOTEROL TARTRATE 15 MCG: 15 SOLUTION RESPIRATORY (INHALATION) at 09:09

## 2025-07-15 RX ADMIN — ACETAMINOPHEN 650 MG: 325 TABLET ORAL at 20:48

## 2025-07-15 RX ADMIN — ROSUVASTATIN CALCIUM 10 MG: 5 TABLET, FILM COATED ORAL at 20:48

## 2025-07-15 RX ADMIN — BUDESONIDE 0.5 MG: 0.5 SUSPENSION RESPIRATORY (INHALATION) at 20:55

## 2025-07-15 RX ADMIN — CARVEDILOL 6.25 MG: 6.25 TABLET, FILM COATED ORAL at 17:20

## 2025-07-15 RX ADMIN — LEVOTHYROXINE SODIUM 50 MCG: 0.05 TABLET ORAL at 06:04

## 2025-07-15 RX ADMIN — BUDESONIDE 0.5 MG: 0.5 SUSPENSION RESPIRATORY (INHALATION) at 09:09

## 2025-07-15 NOTE — PLAN OF CARE
Goal Outcome Evaluation:  Plan of Care Reviewed With: patient, spouse, child        Progress: improving  Outcome Evaluation: Mentation improved this shift. No acute events.VSS.

## 2025-07-15 NOTE — SIGNIFICANT NOTE
Wound Eval / Progress Noted    MAURICE Escamilla     Patient Name: Charlette Rai  : 1935  MRN: [id number]  Today's Date: 7/15/2025                 Admit Date: 2025    Visit Dx:    ICD-10-CM ICD-9-CM   1. Acute UTI  N39.0 599.0   2. Altered mental status, unspecified altered mental status type  R41.82 780.97   3. Oropharyngeal dysphagia  R13.12 787.22   4. Decreased activities of daily living (ADL)  Z78.9 V49.89   5. Difficulty walking  R26.2 719.7         UTI (urinary tract infection)        Past Medical History:   Diagnosis Date    Allergic rhinitis     Anaphylactic shock, unspecified, initial encounter 2023    Anemia     Arthritis     Asthma     USE INHALERS AND NEBULIZERS    Back pain     Bladder disorder     Cancer     LEFT LUNG CANCER  SURGERY AND CHEMO DONE  AND CURRENTLY   RIGHT LUNG CANCER HAS ONLY RECEIVED RADIATION THUS FAR    Chronic kidney disease     stage 3    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 DENIES CP BUT DOES HAVE SOA CHRONIC ISSUE COPD/LUNG CANCER    COPD (chronic obstructive pulmonary disease)     FOLLOWED BY DR MARIAJOSE BAILEY    Coronary artery disease     DENIES CP BUT DOES GET SOA MOST OF THE TIME WITH EXERTION BUT OCC AT REST CHRONIC ISSUE COPD/LUNG CANCER    Deep vein thrombosis     Diabetes mellitus     DOES NOT CHECK BS DAILY    Disease of thyroid gland     HYPOTHYROIDISM    Essential hypertension     Gastric ulcer     GERD (gastroesophageal reflux disease)     Heart murmur     History of transfusion     NO ISSUES POST TRANSFUSION WAS MANY YEARS AGO    Hyperlipemia     Leukocytopenia     FOLLOWED BY DR MIR BAILEY    Limb swelling     Lumbago     Lumbar spinal stenosis     Lung cancer     Migraine headache     Multiple joint pain     On home oxygen therapy     4L/NC PRN    Osteopenia     PNA  (pneumonia) 05/04/2024    Pseudomonas pneumonia 04/29/2024    Reflux esophagitis     Shortness of breath     Thyroid nodule     HAS ULTRASOUND YEARLY BEING MONITORED    Vascular disease     Vitamin D deficiency         Past Surgical History:   Procedure Laterality Date    ABDOMINAL SURGERY      ANGIOPLASTY RENAL ARTERY Left 06/14/2024    stent placement    APPENDECTOMY      ARTERIOGRAM N/A 06/14/2024    Procedure: Arteriogram, right radial access, aortogram and renal arteriogram with possible intervention.;  Surgeon: Dio Miguel MD;  Location: [facility] CATH INVASIVE LOCATION;  Service: Peripheral Vascular;  Laterality: N/A;    ARTERIOVENOUS FISTULA/SHUNT SURGERY Left 05/10/2022    Procedure: Left basilic vein transposition;  Surgeon: Wan Barksdale MD;  Location: [facility] MAIN OR;  Service: Vascular;  Laterality: Left;    ARTERIOVENOUS FISTULA/SHUNT SURGERY Left 03/23/2023    Procedure: Ligation of left arm arteriovenous fistula;  Surgeon: Wan Barksdale MD;  Location: [facility] MAIN OR;  Service: Vascular;  Laterality: Left;    ARTERIOVENOUS FISTULA/SHUNT SURGERY Left 11/30/2023    Procedure: Creation of left arm arteriovenous graft;  Surgeon: Wan Barksdale MD;  Location: [facility] MAIN OR;  Service: Vascular;  Laterality: Left;    BRONCHOSCOPY N/A 04/24/2024    Procedure: BRONCHOSCOPY WITH BAL AND WASHINGS;  Surgeon: Khalif Yanez MD;  Location: [facility] ENDOSCOPY;  Service: Pulmonary;  Laterality: N/A;  MUCUS PLUGGING    CARDIAC CATHETERIZATION  1996    CARDIAC SURGERY      CARDIAC SURGERY      fluid drained from heart    CATARACT EXTRACTION, BILATERAL  2003    COLONOSCOPY  2014    ENDOSCOPY  2016    2019    ENDOSCOPY N/A 06/25/2025    Procedure: ESOPHAGOGASTRODUODENOSCOPY;  Surgeon: Tanvi Del Valle MD;  Location: [facility] ENDOSCOPY;  Service: Gastroenterology;  Laterality: N/A;  gastritis, esophagitis    FEMORAL ARTERY STENT Bilateral     HYSTERECTOMY      LEG THROMBECTOMY/EMBOLECTOMY Left 01/29/2025     Procedure: LEFT LOWER EXTREMITY ANGIOGRAM;  Surgeon: Jaden Castañeda MD;  Location: [facility] HYBRID OR;  Service: Vascular;  Laterality: Left;    LUNG BIOPSY Left 2005    lobectomy upper lung caner    LUNG VOLUME REDUCTION      OTHER SURGICAL HISTORY      artifical joints/limbs    REPLACEMENT TOTAL KNEE Left 2016    SHUNT O GRAM N/A 01/13/2025    Procedure: dialysis shuntogram;  Surgeon: Court Valente MD;  Location: [facility] CATH INVASIVE LOCATION;  Service: Peripheral Vascular;  Laterality: N/A;    UPPER GASTROINTESTINAL ENDOSCOPY           Physical Assessment:  Wound Right posterior heel Pressure Injury (Active)   Pressure Injury Stage DTPI 07/15/25 0855   Dressing Appearance open to air 07/15/25 0855   Dressing Removed Type silicone border foam 07/14/25 1545   Confirmed Empty Wound Bed Yes, visual inspection of wound bed 07/14/25 1545   Closure None 07/15/25 0855   Base dry;maroon/purple;nonblanchable 07/15/25 0855   Periwound intact;dry;pink 07/15/25 0855   Periwound Temperature warm 07/15/25 0855   Periwound Skin Turgor soft 07/15/25 0855   Edges rolled/closed 07/15/25 0855   Drainage Amount none 07/15/25 0855   Care, Wound cleansed with;sterile normal saline 07/15/25 0855   Dressing Care dressing applied;non-adherent;petroleum-based;gauze;silicone border foam 07/15/25 0855   Periwound Care absorptive dressing applied 07/15/25 0855       Wound 07/15/25 1213 Right gluteal Traumatic (Active)   Wound Image   07/15/25 0855   Dressing Appearance intact;dry 07/15/25 0855   Dressing Removed Type gauze, dry;silicone border foam 07/15/25 0855   Confirmed Empty Wound Bed Yes, visual inspection of wound bed 07/15/25 0855   Closure None 07/15/25 0855   Base blanchable;red;pink;epithelialization;moist 07/15/25 0855   Periwound intact;dry;pink 07/15/25 0855   Periwound Temperature warm 07/15/25 0855   Periwound Skin Turgor soft 07/15/25 0855   Edges open 07/15/25 0855   Drainage Characteristics/Odor serosanguineous 07/15/25  0855   Drainage Amount scant 07/15/25 0855   Care, Wound cleansed with;sterile normal saline 07/15/25 0855   Dressing Care open to air;skin barrier agent applied 07/15/25 0855   Periwound Care barrier ointment applied 07/15/25 0855        Wound Check / Follow-up:  Patient was seen today for a wound check and dressing change. Patient resting comfortable with eyes closed, no signs of distress; she does open eyes when spoken to.    Right gluteal aspect has a small area that open, base is shallow. Periwound is soft and intact, tissue is pink. Recommending to application of the orange top moisture barrier TID / PRN and leave open to air. Continue Q2H turns and offload at all times. Keep the patient clean and free from all moisture.    Right heel DTPI remains closed, tissue is maroon / purple and non-blanchable. Recommending every other day dressing changes with non-adherent petroleum gauze. Offload heels at all times.    Impression: DTPI to the right heel, small area of erosion to the right glut     Short term goals:  regain skin integrity, skin protection, topical treatment, pressure reduction, moisture prevention, every other day dressing changes.    Hamida Gonzalez RN    7/15/2025    12:19 EDT

## 2025-07-15 NOTE — PROGRESS NOTES
"[facility]  [facility] Tele[facility]    Routine Consult Follow-Up    Patient Name:   Charlette Rai  YOB: 1935  Identification Number:   MRN - [id number]  Date of Service:   07/15/2025 12:39:02    Diagnosis        G93.49 - Encephalopathy Multifactorial    Impression  Patient has a past medical history of peripheral arterial disease, renal artery stenosis status post stenting, hypertension, ESRD on hemodialysis, diastolic CHF, COPD, lung cancer on chemotherapy, type 2 diabetes. Patient had an episode of worsening altered mental status during dialysis. CTA head and neck performed, MRI brain without acute ischemic infarct. Possible metabolic and infectious etiology as well as possible medication side effects contributing. Patient noted to have elevated LFTs, thrombocytopenic. Patient currently on aspirin, Plavix with history of recent GI bleed. Will defer to primary team. Please recall as needed.    Our recommendations are outlined below    Nursing Recommendations :  Delirium precautions: Blinds open during the day, closed at night, frequent reorientation, minimize nighttime interruptionsWhen possible avoid benzodiazepines, opioid pain medications, and anticholinergic medications    DVT Prophylaxis :  Choice of Primary Team    Disposition :  Neurology will sign off. Reconsult if Needed.Outpatient Neurology follow up in 1-3 weeks    Subjective  Patient states she is \"not so good\".    Hospital Course  Patient is an 89-year-old female who developed disorientation and weakness after dialysis. Patient was admitted for altered mental status, fall with left hip fracture as well as end-stage renal disease. She had generalized weakness. Was having hypotension and lethargy during admission. Stroke alert placed on 7/14/2025 as during dialysis became more lethargic and not responsive. Past medical history of hypertension, dyslipidemia, end-stage renal disease on dialysis. Patient " had recent admission for GI bleed. Patient pending MRI brain imaging to rule out vascular event. Also concern for possible cefepime related encephalopathy.  Patient has a past medical history of peripheral arterial disease, renal artery stenosis status post stenting, hypertension, ESRD on hemodialysis, diastolic CHF, COPD, lung cancer on chemotherapy, type 2 diabetes. Reported diarrhea for several days, not eating for a few days. Had been getting IV fluids with dialysis but due to hypotension. Recently hospitalized on 6/24 - 7/3/2025 due to hematemesis and worsening weakness. EGD at that time showed esophagitis and gastritis. She did receive a unit of blood. At discharge she was restarted on aspirin and Plavix. 6/11/2025 patient had a fall with periprosthetic femoral fracture and sent to [facility] for repair. Patient has a history of left upper lobectomy. Also being treated for UTI. Also noted to have a history of chronic hypoxemia on home oxygen. Records indicates she completed cefepime course. Patient was also receiving Dilaudid. Patient was restarted on aspirin and Plavix on 7/13/2025.    Imaging  MRI brain: IMPRESSION:  Impression:  No acute intracranial finding..    Nonspecific large bilateral mastoid effusions and right middle ear effusion.    CTA head and neck: Impression:  1.Partially calcified plaque along right carotid bifurcation resulting in focal 60% stenosis along proximal right ICA.  2.Partially calcified plaque along left carotid bifurcation that is not hemodynamically significant.  3.Moderate to severe stenosis along proximal left subclavian artery although it remains patent throughout.  4.Left vertebral artery is patent with mild stenosis in its V3 segment and focal moderate stenosis of V4 segment.  5.Major arterial vasculature intracranially at [place] and distally is patent, with no thrombus or aneurysm.    Labs  Sodium 131, potassium 4.2, BUN 31.2, creatinine 2.57, GFR 17.5,  Glu close 89, hemoglobin A1c 4.4, LDL 13, WBC 8.8, platelets 98      Examination  BP(127/55), Pulse(70), Temp(97.5), Resp(18),    Neuro Exam:      Patient is awake, alert, oriented to year. Speech overall clear, language fluent. Follows two-step command. Patient with symmetric smile. Able to lift all extremities off the bed without focal deficit or drift. Finger-nose intact bilaterally.            This consult was conducted in real time using interactive audio and video technology. Patient was informed of the technology being used for this visit and agreed to proceed. Patient located in hospital and provider located at home/office setting.    Telehealth Neurology consultation was provided. I spent minutes providing telehealth care. This includes time spent for face to face visit via telemedicine, review of medical records, imaging studies and discussion of findings with providers, the patient and/or family.      Dr Michelle Brown      [facility]  For Inpatient follow-up with [facility] physician please call [facility] at [phone]. As we are not an outpatient service for any post hospital discharge needs please contact the hospital for assistance.  If you have any questions for the [facility] physicians or need to reconsult for clinical or diagnostic changes please contact us via [facility] at [phone]

## 2025-07-15 NOTE — PROGRESS NOTES
[facility]     Nephrology Progress Note      Patient Name: Charlette Rai  : 1935  MRN: [id number]  Primary Care Physician:  Brennan Mosqueda MD  Date of admission: 2025    Subjective   Subjective     Interval History:  Pt resting comfortably, no new events.  No shortness of breath or chest pain.      Review of Systems   All systems were reviewed and negative except for: What is mentioned above.    Objective   Objective     Vitals:   Temp:  [97.3 °F (36.3 °C)-98.1 °F (36.7 °C)] 97.5 °F (36.4 °C)  Heart Rate:  [70-99] 78  Resp:  [11-22] 18  BP: (111-147)/(43-81) 127/55  Flow (L/min) (Oxygen Therapy):  [1] 1  Physical Exam:   Constitutional: Awake, alert.  Tired looking, slightly slow to respond.   Respiratory: Decreased to auscultation bilaterally, nonlabored respirations    Cardiovascular: RRR, 2/6 systolic ejection murmur   gastrointestinal: Positive bowel sounds, soft, nontender, nondistended   Musculoskeletal: No significant lower extremity swelling no cyanosis   Psychiatric: Appropriate affect, cooperative   Neurologic: Oriented, moving all extremities,   Skin: Some bruising  Chest: Right IJ TDC in place.    Result Review    Result Reviewed:  I have personally reviewed the results from the time of this admission to 7/15/2025 09:19 EDT and agree with these findings:  [x]  Laboratory  []  Microbiology  [x]  Radiology  []  EKG/Telemetry   []  Cardiology/Vascular   []  Pathology  [x]  Old records  []  Other:        Lab 07/15/25  0527 25  0439 25  0440 25  0435 25  0425 07/10/25  0436 25  1529   SODIUM 131* 136 133* 135* 135* 134* 133*   POTASSIUM 4.2 3.8 4.1 3.8 3.4* 3.5 3.4*   CHLORIDE 100 104 103 102 99 99 95*   CO2 23.7 20.6* 19.5* 23.6 23.8 23.9 26.8   BUN 34.2* 44.5* 34.6* 26.6* 39.5* 32.1* 27.0*   CREATININE 2.57* 3.21* 2.79* 2.29* 3.31* 2.63* 2.16*   GLUCOSE 81 85 99 103* 71 86 108*   EGFR 17.5* 13.4* 15.9* 20.1* 12.9* 17.0* 21.5*   ANION GAP 7.3 11.4  10.5 9.4 12.2 11.1 11.2   MAGNESIUM 1.9 2.0 1.9 1.8 1.9 1.9 1.9   PHOSPHORUS 3.3 3.4 3.3 2.9 4.2 3.4  --              Assessment & Plan   Assessment / Plan       Active Hospital Problems:  Active Hospital Problems    Diagnosis     **UTI (urinary tract infection)        Assessment and Plan:      - ESRD, dialysis through right IJ TDC.  Was at [facility] receiving daily dialysis.  Here dialysis MWF schedule. Adjusted to her labs.  Electrolytes and volume status are adequate.     - Recent fall and left hip fracture, status post repair.     - Severe anemia, and recent GI bleed.  Elevated ferritin.  Will continue Epogen with dialysis.  Status post recent EGD and transfusion.     - Hypotension, likely secondary to volume depletion, better now.     - Secondary hyperparathyroidism and hyperphosphatemia, being addressed with dialysis.  Low phosphorus diet.  Sevelamer on hold.  Phosphorus normal currently     - Severe peripheral arterial disease including lower extremities and renal artery stenosis, status post PTA and stent 6/14/2024.  Back on Plavix and baby aspirin.     - Lung cancer.  Status post radiation.     - Type 2 diabetes with complications, per primary.    - Altered mentation, neurology following, head CT and neurovascular imaging noted likely cefepime neurotoxicity.

## 2025-07-15 NOTE — SIGNIFICANT NOTE
07/15/25 1350   OTHER   Discipline occupational therapist   Rehab Time/Intention   Session Not Performed other (see comments)  (Discharge re eval order. Family seeking pulmonary rehab, discharge OT order)

## 2025-07-15 NOTE — THERAPY EVALUATION
Respiratory Therapist Broncho-Pulmonary Hygiene Progress Note      Patient Name:  Charlette Rai  YOB: 1935    Charlette Rai meets the qualification for Level 1 of the Bronco-Pulmonary Hygiene Protocol. This was based on my daily patient assessment and includes review of chest x-ray results, cough ability and quality, oxygenation, secretions or risk for secretion development and patient mobility.     Broncho-Pulmonary Hygiene Assessment:    Level of Movement: Actively changing positions without assistance  Alert/ oriented/ cooperative    Breath Sounds: Diminished and/or coarse rhonchi    Cough: Strong, effective and/or frequent    Chest X-Ray: Possible signs of consolidation and/or atelectasis or clear.     Sputum Productions: None or small amount of thin or watery secretions with effective cough    History and Physical: None    SpO2 to Oxygen Need: greater than 92% on room air or  less than 3L nasal canula    Current SpO2 is: 100% on RA    Based on this information, I have completed the following interventions: Teach/Instruct patient on cough and deep breathe      Electronically signed by Magnolia Chris CRT, 07/15/25, 10:31 AM EDT.

## 2025-07-15 NOTE — PROGRESS NOTES
Nutrition Services    Patient Name: Charlette Rai  YOB: 1935  MRN: [id number]  Admission date: 7/9/2025      CLINICAL NUTRITION ASSESSMENT      Reason for Assessment  MST Score 2+     H&P:  Past Medical History:   Diagnosis Date    Allergic rhinitis     Anaphylactic shock, unspecified, initial encounter 12/05/2023    Anemia     Arthritis     Asthma     USE INHALERS AND NEBULIZERS    Back pain     Bladder disorder     Cancer     LEFT LUNG CANCER 2005 SURGERY AND CHEMO DONE  AND CURRENTLY 4/ 14/22  RIGHT LUNG CANCER HAS ONLY RECEIVED RADIATION THUS FAR    Chronic kidney disease     stage 3    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 DENIES CP BUT DOES HAVE SOA CHRONIC ISSUE COPD/LUNG CANCER    COPD (chronic obstructive pulmonary disease)     FOLLOWED BY DR MARIAJOSE BAILEY    Coronary artery disease     DENIES CP BUT DOES GET SOA MOST OF THE TIME WITH EXERTION BUT OCC AT REST CHRONIC ISSUE COPD/LUNG CANCER    Deep vein thrombosis     Diabetes mellitus     DOES NOT CHECK BS DAILY    Disease of thyroid gland     HYPOTHYROIDISM    Essential hypertension     Gastric ulcer     GERD (gastroesophageal reflux disease)     Heart murmur     History of transfusion     NO ISSUES POST TRANSFUSION WAS MANY YEARS AGO    Hyperlipemia     Leukocytopenia     FOLLOWED BY DR MIR BAILEY    Limb swelling     Lumbago     Lumbar spinal stenosis     Lung cancer     Migraine headache     Multiple joint pain     On home oxygen therapy     4L/NC PRN    Osteopenia     PNA (pneumonia) 05/04/2024    Pseudomonas pneumonia 04/29/2024    Reflux esophagitis     Shortness of breath     Thyroid nodule     HAS ULTRASOUND YEARLY BEING MONITORED    Vascular disease     Vitamin D deficiency         Current Problems:   Active Hospital Problems    Diagnosis     **UTI (urinary tract  "infection)         Nutrition/Diet History         Narrative   07/15: Follow up for po intake. Remains on a cardiac renal diabetic diet w/ NovaSource TID for ONS, avg intake of 25-50 at meals per nutrition ADLs. Pt with c/o weakness during visit, reports not eating breakfast this AM but good acceptance of ONS. Encouraged use when solid food intake not desired. Educated on the importance of adequate calories and protein for energy and recovery. Obtained food preferences for meals     07/10: Admitted for AMS. Pt is lethargic and falling asleep during interview. Pt receives dialysis. Multiple wounds noted. Recommend Novasource BID for additional protein.      Anthropometrics        Current Height, Weight Height: 157.5 cm (62\")  Weight: 75.3 kg (166 lb 0.1 oz)   Current BMI Body mass index is 30.36 kg/m².   BMI Classification Overweight   % IBW 50.1 kg   Adjusted Body Weight (ABW)    Weight Hx  Wt Readings from Last 30 Encounters:   07/14/25 0800 75.3 kg (166 lb 0.1 oz)   07/09/25 1441 68.2 kg (150 lb 5.7 oz)   07/03/25 0459 61.1 kg (134 lb 11.2 oz)   07/02/25 0708 60.6 kg (133 lb 9.6 oz)   07/01/25 0556 58.7 kg (129 lb 6.6 oz)   06/30/25 0500 61.8 kg (136 lb 3.9 oz)   06/29/25 0412 61.9 kg (136 lb 7.4 oz)   06/28/25 0435 56.4 kg (124 lb 5.4 oz)   06/27/25 0536 60.6 kg (133 lb 9.6 oz)   06/25/25 0356 64.8 kg (142 lb 13.7 oz)   06/24/25 1804 62.8 kg (138 lb 7.2 oz)   06/17/25 1130 66.7 kg (147 lb)   06/06/25 1755 66.7 kg (147 lb 0.8 oz)   06/03/25 1410 66.7 kg (147 lb)   05/13/25 1308 64.8 kg (142 lb 13.7 oz)   04/22/25 1309 65.2 kg (143 lb 12.8 oz)   04/22/25 1412 64.9 kg (143 lb)   04/01/25 0954 64 kg (141 lb 3.2 oz)   03/25/25 1111 61.6 kg (135 lb 12.8 oz)   03/18/25 1152 61.7 kg (136 lb)   03/03/25 1458 63 kg (138 lb 14.2 oz)   02/28/25 1515 62.1 kg (136 lb 12.7 oz)   02/26/25 1448 66 kg (145 lb 9.8 oz)   02/25/25 1236 63.5 kg (140 lb)   02/17/25 1448 64.8 kg (142 lb 13.7 oz)   02/06/25 1112 64.8 kg (142 lb 13.7 oz) "   02/04/25 1013 64.4 kg (142 lb)   02/04/25 1040 64.4 kg (142 lb)   01/27/25 0727 64.5 kg (142 lb 3.2 oz)   01/26/25 2147 66.6 kg (146 lb 13.2 oz)   01/13/25 1350 65.2 kg (143 lb 11.8 oz)   01/09/25 1321 63.5 kg (140 lb)   12/10/24 1417 63.5 kg (140 lb)   11/19/24 1235 64 kg (141 lb)   11/05/24 1209 62.6 kg (138 lb)   10/01/24 0104 61.7 kg (136 lb)   09/24/24 1349 61.7 kg (136 lb)   09/24/24 1045 61 kg (134 lb 7.7 oz)   09/17/24 1312 63 kg (139 lb)   09/15/24 2004 63.3 kg (139 lb 8.8 oz)          Wt Change Observation 2+ edema to L leg  UBW of 135-145 lbs     Estimated/Assessed Needs  Estimated Needs based on: Current Body Weight       Energy Requirements 25-30 kcal/kg   EST Needs (kcal/day) 7598-4245 kcal       Protein Requirements 1.5-2 g/kg   EST Daily Needs (g/day) 102-136 g       Fluid Requirements 1 ml/kcal    Estimated Needs (mL/day) 3684-8808 ml     Labs/Medications         Pertinent Labs A1c 4.4% w/ BG WNL   Results from last 7 days   Lab Units 07/15/25  0527 07/14/25  0439 07/13/25  0440   SODIUM mmol/L 131* 136 133*   POTASSIUM mmol/L 4.2 3.8 4.1   CHLORIDE mmol/L 100 104 103   CO2 mmol/L 23.7 20.6* 19.5*   BUN mg/dL 34.2* 44.5* 34.6*   CREATININE mg/dL 2.57* 3.21* 2.79*   CALCIUM mg/dL 8.0* 7.5* 7.4*   BILIRUBIN mg/dL 0.4 0.3 0.3   ALK PHOS U/L 243* 231* 216*   ALT (SGPT) U/L 42* 38* 41*   AST (SGOT) U/L 68* 60* 82*   GLUCOSE mg/dL 81 85 99     Results from last 7 days   Lab Units 07/15/25  0527 07/14/25  0439 07/13/25  0440   MAGNESIUM mg/dL 1.9 2.0 1.9   PHOSPHORUS mg/dL 3.3 3.4 3.3   HEMOGLOBIN g/dL 7.7* 7.4* 7.7*   HEMATOCRIT % 27.1* 25.5* 27.3*   TRIGLYCERIDES mg/dL 56  --   --      COVID19   Date Value Ref Range Status   07/15/2024 Not Detected Not Detected - Ref. Range Final     Lab Results   Component Value Date    HGBA1C 4.40 (L) 07/15/2025         Pertinent Medications Reviewed.     Malnutrition Severity Assessment              Nutrition Diagnosis         Nutrition Dx Problem 1 Increased  nutrient needs related to increased protein demand as evidenced by impaired skin integrity.     Nutrition Intervention           Current Nutrition Orders & Evaluation of Intake       Current PO Diet Diet: Cardiac, Diabetic, Renal; Healthy Heart (2-3 Na+); Consistent Carbohydrate; Low Sodium (2-3g); Texture: Mechanical Ground (NDD 2); Fluid Consistency: Thin (IDDSI 0)   Supplement Orders Placed This Encounter      Dietary Nutrition Supplements Novasource Renal (Nepro)           Nutrition Intervention/Prescription        Liberalize diet to Renal, d/c diabetic restriction   Continue Novasource TID (475 kcal, 22 g pro)        Medical Nutrition Therapy/Nutrition Education          Learner     Readiness Patient  Acceptance     Method     Response Explanation  Verbalizes understanding     Monitor/Evaluation        Monitor Per protocol, I&O, PO intake, GI status     Nutrition Discharge Plan         Recommend to continue oral nutrition supplements on discharge.      Electronically signed by:  Kassandra Hatch RD  07/15/25 11:23 EDT

## 2025-07-15 NOTE — CONSULTS
Consult received per Stroke Protocol. Patient with a noted DM diagnosis, with a current HbA1c of 4.4%, and an estimated average glucose of 80 mg/dl. Patient's diabetes is currently diet controlled.

## 2025-07-15 NOTE — PROGRESS NOTES
[facility]   Hospitalist Progress Note  Date: 7/15/2025  Patient Name: Charlette Rai  : 1935  MRN: [id number]  Date of admission: 2025    Subjective   Subjective     Chief Complaint:   Fall, weakness    Summary:   89-year-old female with a past medical history significant for peripheral arterial disease, renal artery stenosis status post stenting, hypertension, ESRD on HD, diastolic CHF, COPD, lung cancer on chemotherapy, type 2 diabetes mellitus, reportedly patient has been having significant diarrhea for several days, has not eaten for couple days, had been getting IV fluids with dialysis because of hypotension, patient was recently in the hospital from  to 7/3/2025 at that time she came in with complaint of hematemesis and worsening weakness.  Patient had EGD which showed esophagitis and gastritis.  Patient did receive a unit of blood at that time.  Patient was restarted on aspirin at discharge.  Of note on 2025 patient had a fall and acute periprosthetic femoral fracture and was sent to [facility] for repair.  In the emergency department the patient was found to be hypotensive, was 78% on room air, chest x-ray showed volume loss and haziness throughout the left hemithorax.  Of note patient does have a history of lung cancer and left upper lobe ectomy patient underwent CT scan of the head which was negative for any acute abnormalities however did have some chronic small vessel ischemic changes.  Patient was found to have UTI via UA.  Patient was admitted to the hospital for further workup and management.  Patient initially improved in her mental status, she was alert and oriented, complaining of pain, she did tolerate pain medications well. On the morning of 2025 patient more altered, drowsy, states she is just not feeling right.  There was some concern for some possible left upper extremity weakness although this was not noted by teleneurology as they felt the exam was  symmetric.  Stroke RRT was called and CTA showed right ICA 60% stenosis due to plaque.  Per patient this is chronic.  MRI of the brain came back negative.  Neuro signed off episode appears to be metabolic likely medications..    Interval Followup:   Vital signs stable on 1 L with 97% saturation.  Does use oxygen at home.  Awake alert oriented x 3.  Complaining of left hip pain   Feels weak.  No diarrhea    Objective   Objective     Vitals:   Temp:  [97.3 °F (36.3 °C)-98.2 °F (36.8 °C)] 97.5 °F (36.4 °C)  Heart Rate:  [70-90] 83  Resp:  [16-18] 16  BP: (127-147)/(42-81) 133/55  Flow (L/min) (Oxygen Therapy):  [1] 1    Physical Exam   GEN: No acute distress, resting comfortably in bed  HEENT: Moist mucous membranes  LUNGS: Equal chest rise bilaterally  CARDIAC: 2/6 systolic murmur regular rate and rhythm  NEURO: Moving all 4 extremities spontaneously  SKIN: Multiple skin excoriations    Result Review    Result Review:  I have personally reviewed the results as below  [x]  Laboratory CBC, CMP personally reviewed  CBC          7/13/2025    04:40 7/14/2025    04:39 7/15/2025    05:27   CBC   WBC 9.91  8.34  8.80    RBC 2.38  2.34  2.43    Hemoglobin 7.7  7.4  7.7    Hematocrit 27.3  25.5  27.1    .7  109.0  111.5    MCH 32.4  31.6  31.7    MCHC 28.2  29.0  28.4    RDW 19.4  19.2  19.3    Platelets 91  96  98      CMP          7/13/2025    04:40 7/14/2025    04:39 7/15/2025    05:27   CMP   Glucose 99  85  81    BUN 34.6  44.5  34.2    Creatinine 2.79  3.21  2.57    EGFR 15.9  13.4  17.5    Sodium 133  136  131    Potassium 4.1  3.8  4.2    Chloride 103  104  100    Calcium 7.4  7.5  8.0    Total Protein 4.8  4.9  5.3    Albumin 1.9  2.1  2.3    Globulin 2.9  2.8  3.0    Total Bilirubin 0.3  0.3  0.4    Alkaline Phosphatase 216  231  243    AST (SGOT) 82  60  68    ALT (SGPT) 41  38  42    Albumin/Globulin Ratio 0.7  0.8  0.8    BUN/Creatinine Ratio 12.4  13.9  13.3    Anion Gap 10.5  11.4  7.3      []   Microbiology  []  Radiology  []  EKG/Telemetry   []  Cardiology/Vascular   []  Pathology  []  Old records  []  Other:    Scheduled medications:  arformoterol, 15 mcg, Nebulization, BID - RT  aspirin, 81 mg, Oral, Daily  budesonide, 0.5 mg, Nebulization, BID - RT  [Held by provider] carvedilol, 6.25 mg, Oral, BID With Meals  [Held by provider] cetirizine, 10 mg, Oral, Daily  clopidogrel, 75 mg, Oral, Daily  epoetin mita/mita-epbx, 30,000 Units, Subcutaneous, Weekly  fluticasone, 1 spray, Nasal, Daily  [Held by provider] gabapentin, 100 mg, Oral, BID  [Held by provider] isosorbide mononitrate, 30 mg, Oral, Nightly  levothyroxine, 50 mcg, Oral, Q AM  [Held by provider] losartan, 25 mg, Oral, Q24H  pantoprazole, 40 mg, Oral, BID AC  rosuvastatin, 10 mg, Oral, Nightly  sodium chloride, 10 mL, Intravenous, Q12H  sodium chloride, 10 mL, Intravenous, Q12H      As needed medications:    acetaminophen **OR** acetaminophen **OR** acetaminophen    senna-docusate sodium **AND** polyethylene glycol **AND** bisacodyl **AND** bisacodyl    dextrose    dextrose    glucagon (human recombinant)    heparin (porcine)    hydrALAZINE    ipratropium-albuterol    ondansetron ODT **OR** ondansetron    [Held by provider] oxyCODONE-acetaminophen    sodium chloride    sodium chloride    sodium chloride    sodium chloride    sodium chloride  Infusions:            Assessment & Plan   Assessment / Plan     Assessment:  UTI with altered mental status.  Finished antibiotics  Peripheral vascular disease and renal artery stenosis status post PTA and stent on 6/14/2024 on aspirin and Plavix  Hypotension  Anemia.  Stable  Diarrhea  End-stage renal disease on hemodialysis via right IJ tunnel dialysis catheter on MWF by Dr. García  Severe protein calorie malnutrition  Chronic diastolic heart failure not in acute exacerbation  Hypothyroidism  Chronic hypoxemia on home oxygen  Severe protein calorie malnutrition  Recent left femoral fracture with repair at [facility]  Aortic stenosis  Mild transaminitis    Plan:  Patient admitted to the hospital for further workup and management of above  Completed cefepime, will continue to monitor mental status off as this may have contributed to patient's change in mental status  Noted CTA head and neck and discussed with patient  Teleneurology consulted.  Appreciate input.  Signed off  DC NIH scoring and stroke protocol  Cultures reviewed and negative  Discontinue Dilaudid as this may be contributing to patient's altered mental status.  Low-dose Percocet for pain control  C. difficile negative  FOBT still pending no bowel movements noted  Continue aspirin and Plavix and monitor hemoglobin closely, 7/13/2025  Continue Protonix  Trend H&H.  May need blood transfusion  Enteric panel and bacterial stool panel ordered and still pending  Nephrology consulted for dialysis needs.  Appreciate input  Nutritional consult for severe protein calorie malnutrition  Resume appropriate home medications.  Resume some of the blood pressure medications.  CBC, CMP reviewed 7/15/2025   Staples DC'd from left lower extremity.  Transferred to regular floor with Flex monitor  DC sliding-scale insulin.  Patient not diabetic  PT OT.     Reviewed patient's labs and imaging, and discussed with patient and nurse at bedside.  Patient to go back to [facility].    VTE Prophylaxis:  Pharmacologic & mechanical VTE prophylaxis orders are present.    Time spent: Time spent involving patient care including face-to-face encounter 51 minutes    CODE STATUS:   Code Status (Patient has no pulse and is not breathing): CPR (Attempt to Resuscitate)  Medical Interventions (Patient has pulse or is breathing): Full Support      Electronically signed by Patrick Turcios MD, 07/15/25, 4:22 PM EDT.

## 2025-07-15 NOTE — THERAPY TREATMENT NOTE
Acute Care - Physical Therapy Treatment Note  MAURICE Escamilla     Patient Name: Charlette Rai  : 1935  MRN: [id number]  Today's Date: 7/15/2025      Visit Dx:     ICD-10-CM ICD-9-CM   1. Acute UTI  N39.0 599.0   2. Altered mental status, unspecified altered mental status type  R41.82 780.97   3. Oropharyngeal dysphagia  R13.12 787.22   4. Decreased activities of daily living (ADL)  Z78.9 V49.89   5. Difficulty walking  R26.2 719.7     Patient Active Problem List   Diagnosis    Malignant neoplasm of upper lobe of right lung    Rheumatoid arthritis    Asthma    Chronic heart failure with preserved ejection fraction    Essential hypertension    GERD (gastroesophageal reflux disease)    Hyperlipidemia LDL goal <70    Lumbar spinal stenosis    Migraine    Monoclonal paraproteinemia    Osteopenia    Oxygen dependent    Peripheral neuropathy    Stage 4 chronic kidney disease    Vitamin D deficiency    Carotid artery stenosis    Chronic right-sided thoracic back pain    Peripheral vascular disease of lower extremity    Ex-smoker    De Quervain's tenosynovitis    Arthritis of carpometacarpal (CMC) joint of right thumb    Steal syndrome of dialysis vascular access    Anemia of chronic renal failure, stage 4 (severe)    Aortic stenosis, moderate    Hematoma    End stage renal disease on dialysis    Coronary artery calcification seen on CT scan    Frailty syndrome in geriatric patient    COPD with lower respiratory infection    Coagulation defect, unspecified    Hyperphosphatemia    Hypothyroidism (acquired)    Idiopathic osteoarthritis    Secondary multiple arthritis    Type 2 diabetes mellitus with diabetic peripheral angiopathy without gangrene    Renal artery stenosis    S/P renal artery angioplasty    Atherosclerosis of renal artery    Abnormal serum immunoelectrophoresis    NSVT (nonsustained ventricular tachycardia)    Hypertensive heart and chronic kidney disease with heart failure and with stage 5 chronic kidney  disease, or end stage renal disease    Unspecified protein-calorie malnutrition    IFG (impaired fasting glucose)    Iron deficiency anemia    AV graft malfunction, initial encounter    AV graft malfunction    Weakness    Malignant neoplasm of lower lobe of left lung    Malignant neoplasm of lower lobe, right bronchus or lung    Severe protein-calorie malnutrition    UTI (urinary tract infection)     Past Medical History:   Diagnosis Date    Allergic rhinitis     Anaphylactic shock, unspecified, initial encounter 12/05/2023    Anemia     Arthritis     Asthma     USE INHALERS AND NEBULIZERS    Back pain     Bladder disorder     Cancer     LEFT LUNG CANCER 2005 SURGERY AND CHEMO DONE  AND CURRENTLY 4/ 14/22  RIGHT LUNG CANCER HAS ONLY RECEIVED RADIATION THUS FAR    Chronic kidney disease     stage 3    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 DENIES CP BUT DOES HAVE SOA CHRONIC ISSUE COPD/LUNG CANCER    COPD (chronic obstructive pulmonary disease)     FOLLOWED BY DR MARIAJOSE BAILEY    Coronary artery disease     DENIES CP BUT DOES GET SOA MOST OF THE TIME WITH EXERTION BUT OCC AT REST CHRONIC ISSUE COPD/LUNG CANCER    Deep vein thrombosis     Diabetes mellitus     DOES NOT CHECK BS DAILY    Disease of thyroid gland     HYPOTHYROIDISM    Essential hypertension     Gastric ulcer     GERD (gastroesophageal reflux disease)     Heart murmur     History of transfusion     NO ISSUES POST TRANSFUSION WAS MANY YEARS AGO    Hyperlipemia     Leukocytopenia     FOLLOWED BY DR MIR BAILEY    Limb swelling     Lumbago     Lumbar spinal stenosis     Lung cancer     Migraine headache     Multiple joint pain     On home oxygen therapy     4L/NC PRN    Osteopenia     PNA (pneumonia) 05/04/2024    Pseudomonas pneumonia 04/29/2024    Reflux esophagitis     Shortness of breath      Thyroid nodule     HAS ULTRASOUND YEARLY BEING MONITORED    Vascular disease     Vitamin D deficiency      Past Surgical History:   Procedure Laterality Date    ABDOMINAL SURGERY      ANGIOPLASTY RENAL ARTERY Left 06/14/2024    stent placement    APPENDECTOMY      ARTERIOGRAM N/A 06/14/2024    Procedure: Arteriogram, right radial access, aortogram and renal arteriogram with possible intervention.;  Surgeon: Dio Miguel MD;  Location: [facility] CATH INVASIVE LOCATION;  Service: Peripheral Vascular;  Laterality: N/A;    ARTERIOVENOUS FISTULA/SHUNT SURGERY Left 05/10/2022    Procedure: Left basilic vein transposition;  Surgeon: Wan Barksdale MD;  Location: [facility] MAIN OR;  Service: Vascular;  Laterality: Left;    ARTERIOVENOUS FISTULA/SHUNT SURGERY Left 03/23/2023    Procedure: Ligation of left arm arteriovenous fistula;  Surgeon: Wan Barksdale MD;  Location: [facility] MAIN OR;  Service: Vascular;  Laterality: Left;    ARTERIOVENOUS FISTULA/SHUNT SURGERY Left 11/30/2023    Procedure: Creation of left arm arteriovenous graft;  Surgeon: Wan Barksdale MD;  Location: [facility] MAIN OR;  Service: Vascular;  Laterality: Left;    BRONCHOSCOPY N/A 04/24/2024    Procedure: BRONCHOSCOPY WITH BAL AND WASHINGS;  Surgeon: Khalif Yanez MD;  Location: [facility] ENDOSCOPY;  Service: Pulmonary;  Laterality: N/A;  MUCUS PLUGGING    CARDIAC CATHETERIZATION  1996    CARDIAC SURGERY      CARDIAC SURGERY      fluid drained from heart    CATARACT EXTRACTION, BILATERAL  2003    COLONOSCOPY  2014    ENDOSCOPY  2016    2019    ENDOSCOPY N/A 06/25/2025    Procedure: ESOPHAGOGASTRODUODENOSCOPY;  Surgeon: Tanvi Del Valle MD;  Location: [facility] ENDOSCOPY;  Service: Gastroenterology;  Laterality: N/A;  gastritis, esophagitis    FEMORAL ARTERY STENT Bilateral     HYSTERECTOMY      LEG THROMBECTOMY/EMBOLECTOMY Left 01/29/2025    Procedure: LEFT LOWER EXTREMITY ANGIOGRAM;  Surgeon: Jaden Castañeda MD;  Location: [facility] HYBRID OR;  Service:  Vascular;  Laterality: Left;    LUNG BIOPSY Left 2005    lobectomy upper lung caner    LUNG VOLUME REDUCTION      OTHER SURGICAL HISTORY      artifical joints/limbs    REPLACEMENT TOTAL KNEE Left 2016    SHUNT O GRAM N/A 01/13/2025    Procedure: dialysis shuntogram;  Surgeon: Court Valente MD;  Location: [facility] INVASIVE LOCATION;  Service: Peripheral Vascular;  Laterality: N/A;    UPPER GASTROINTESTINAL ENDOSCOPY       PT Assessment (Last 12 Hours)       PT Evaluation and Treatment       Row Name 07/15/25 1100          Physical Therapy Time and Intention    Subjective Information complains of;weakness;fatigue (P)   -DF     Document Type therapy note (daily note) (P)   -DF     Mode of Treatment individual therapy;physical therapy (P)   -DF     Patient Effort adequate (P)   -DF     Symptoms Noted During/After Treatment none (P)   -DF       Row Name 07/15/25 1100          Bed Mobility    Bed Mobility other (see comments) (P)   pt declined bed mobility  -DF       Row Name 07/15/25 1100          Transfers    Transfers other (see comments) (P)   pt declined transfers  -DF       Row Name 07/15/25 1100          Motor Skills    Therapeutic Exercise hip;knee (P)   -DF       Row Name 07/15/25 1100          Hip (Therapeutic Exercise)    Hip (Therapeutic Exercise) strengthening exercise (P)   -DF     Hip Strengthening (Therapeutic Exercise) -- (P)   Glute sets x10  -DF       Row Name 07/15/25 1100          Knee (Therapeutic Exercise)    Knee (Therapeutic Exercise) strengthening exercise (P)   -DF     Knee Strengthening (Therapeutic Exercise) SLR (straight leg raise);SAQ (short arc quad);10 repetitions (P)   -DF       Row Name             Wound Right posterior heel Pressure Injury    Wound - Properties Group Present on Original Admission: Y  -JT Side: Right  -JT Orientation: posterior  -JT Location: heel  -JT Primary Wound Type: Pressure Inj  -JT    Retired Wound - Properties Group Present on Original Admission: Y  -JT  Side: Right  -JT Orientation: posterior  -JT Location: heel  -JT    Retired Wound - Properties Group Present on Original Admission: Y  -JT Side: Right  -JT Orientation: posterior  -JT Location: heel  -JT    Retired Wound - Properties Group Present on Original Admission: Y  -JT Side: Right  -JT Location: heel  -JT      Row Name             Wound 07/10/25 0038 coccyx    Wound - Properties Group Placement Date: 07/10/25  -MA Placement Time: 0038  -MA Location: coccyx  -MA    Retired Wound - Properties Group Placement Date: 07/10/25  -MA Placement Time: 0038  -MA Location: coccyx  -MA    Retired Wound - Properties Group Placement Date: 07/10/25  -MA Placement Time: 0038  -MA Location: coccyx  -MA    Retired Wound - Properties Group Date first assessed: 07/10/25  -MA Time first assessed: 0038  -MA Location: coccyx  -MA              User Key  (r) = Recorded By, (t) = Taken By, (c) = Cosigned By      Initials Name Provider Type    JT Padmaja Franco, RN Registered Nurse    Lilliam Smith, RN Registered Nurse    Pawan Parson, PT Student PT Student                    Physical Therapy Education       Title: PT OT SLP Therapies (In Progress)       Topic: Physical Therapy (In Progress)       Point: Mobility training (Done)       Learning Progress Summary            Patient Acceptance, E,TB, VU by DF at 7/15/2025 1125    Acceptance, E,TB, NR by LEANDER at 7/14/2025 1444                      Point: Home exercise program (Not Started)       Learner Progress:  Not documented in this visit.              Point: Body mechanics (In Progress)       Learning Progress Summary            Patient Acceptance, E,TB, NR by AV at 7/14/2025 1444                      Point: Precautions (In Progress)       Learning Progress Summary            Patient Acceptance, E,TB, NR by AV at 7/14/2025 1444                                      User Key       Initials Effective Dates Name Provider Type Discipline    AV 06/11/21 -  Abdi Odom  PT Physical Therapist PT     06/04/25 -  Pawan Langford, PT Student PT Student PT                  PT Recommendation and Plan         Outcome Measures       Row Name 07/15/25 1100 07/14/25 1400          How much help from another person do you currently need...    Turning from your back to your side while in flat bed without using bedrails? 2 (P)   -DF 2  -AV     Moving from lying on back to sitting on the side of a flat bed without bedrails? 2 (P)   -DF 2  -AV     Moving to and from a bed to a chair (including a wheelchair)? 2 (P)   -DF 2  -AV     Standing up from a chair using your arms (e.g., wheelchair, bedside chair)? 2 (P)   -DF 2  -AV     Climbing 3-5 steps with a railing? 1 (P)   -DF 1  -AV     To walk in hospital room? 1 (P)   -DF 1  -AV     AM-PAC 6 Clicks Score (PT) 10 (P)   -DF 10  -AV        Functional Assessment    Outcome Measure Options AM-PAC 6 Clicks Basic Mobility (PT) (P)   -DF AM-PAC 6 Clicks Basic Mobility (PT)  -AV               User Key  (r) = Recorded By, (t) = Taken By, (c) = Cosigned By      Initials Name Provider Type    AV Abdi Odom, PT Physical Therapist    DF Pawan Langford, PT Student PT Student                     Time Calculation:    PT Charges       Row Name 07/15/25 1123             Time Calculation    PT Received On 07/15/25 (P)   -DF         Timed Charges    14260 - PT Therapeutic Exercise Minutes 15 (P)   -DF         Total Minutes    Timed Charges Total Minutes 15 (P)   -DF       Total Minutes 15 (P)   -DF                User Key  (r) = Recorded By, (t) = Taken By, (c) = Cosigned By      Initials Name Provider Type    DF Pawan Langford, PT Student PT Student                      PT G-Codes  Outcome Measure Options: (P) AM-PAC 6 Clicks Basic Mobility (PT)  AM-PAC 6 Clicks Score (PT): (P) 10  AM-PAC 6 Clicks Score (OT): 9    Pawan Langford PT Student  7/15/2025

## 2025-07-16 ENCOUNTER — APPOINTMENT (OUTPATIENT)
Dept: NEPHROLOGY | Facility: HOSPITAL | Age: 89
End: 2025-07-16
Payer: MEDICARE

## 2025-07-16 LAB
ANION GAP SERPL CALCULATED.3IONS-SCNC: 10.1 MMOL/L (ref 5–15)
BUN SERPL-MCNC: 43.2 MG/DL (ref 8–23)
BUN/CREAT SERPL: 14.5 (ref 7–25)
CALCIUM SPEC-SCNC: 8 MG/DL (ref 8.6–10.5)
CHLORIDE SERPL-SCNC: 97 MMOL/L (ref 98–107)
CO2 SERPL-SCNC: 20.9 MMOL/L (ref 22–29)
CREAT SERPL-MCNC: 2.97 MG/DL (ref 0.57–1)
EGFRCR SERPLBLD CKD-EPI 2021: 14.7 ML/MIN/1.73
GLUCOSE SERPL-MCNC: 99 MG/DL (ref 65–99)
HCT VFR BLD AUTO: 25.8 % (ref 34–46.6)
HGB BLD-MCNC: 7.6 G/DL (ref 12–15.9)
POTASSIUM SERPL-SCNC: 4.1 MMOL/L (ref 3.5–5.2)
SODIUM SERPL-SCNC: 128 MMOL/L (ref 136–145)

## 2025-07-16 PROCEDURE — 94760 N-INVAS EAR/PLS OXIMETRY 1: CPT

## 2025-07-16 PROCEDURE — 94799 UNLISTED PULMONARY SVC/PX: CPT

## 2025-07-16 PROCEDURE — 25010000002 HEPARIN (PORCINE) PER 1000 UNITS: Performed by: INTERNAL MEDICINE

## 2025-07-16 PROCEDURE — 25010000002 ONDANSETRON PER 1 MG: Performed by: INTERNAL MEDICINE

## 2025-07-16 PROCEDURE — 85014 HEMATOCRIT: CPT | Performed by: INTERNAL MEDICINE

## 2025-07-16 PROCEDURE — 85018 HEMOGLOBIN: CPT | Performed by: INTERNAL MEDICINE

## 2025-07-16 PROCEDURE — 97530 THERAPEUTIC ACTIVITIES: CPT

## 2025-07-16 PROCEDURE — 99233 SBSQ HOSP IP/OBS HIGH 50: CPT | Performed by: INTERNAL MEDICINE

## 2025-07-16 PROCEDURE — 80048 BASIC METABOLIC PNL TOTAL CA: CPT | Performed by: INTERNAL MEDICINE

## 2025-07-16 RX ORDER — OXYCODONE HYDROCHLORIDE 5 MG/1
2.5 TABLET ORAL EVERY 12 HOURS PRN
Refills: 0 | Status: DISCONTINUED | OUTPATIENT
Start: 2025-07-16 | End: 2025-07-18 | Stop reason: HOSPADM

## 2025-07-16 RX ADMIN — ARFORMOTEROL TARTRATE 15 MCG: 15 SOLUTION RESPIRATORY (INHALATION) at 20:07

## 2025-07-16 RX ADMIN — IPRATROPIUM BROMIDE AND ALBUTEROL SULFATE 3 ML: .5; 3 SOLUTION RESPIRATORY (INHALATION) at 04:31

## 2025-07-16 RX ADMIN — ACETAMINOPHEN 650 MG: 325 TABLET ORAL at 22:19

## 2025-07-16 RX ADMIN — OXYCODONE 2.5 MG: 5 TABLET ORAL at 17:34

## 2025-07-16 RX ADMIN — Medication 10 ML: at 21:12

## 2025-07-16 RX ADMIN — Medication 10 ML: at 13:25

## 2025-07-16 RX ADMIN — LEVOTHYROXINE SODIUM 50 MCG: 0.05 TABLET ORAL at 05:08

## 2025-07-16 RX ADMIN — PANTOPRAZOLE SODIUM 40 MG: 40 TABLET, DELAYED RELEASE ORAL at 05:08

## 2025-07-16 RX ADMIN — FLUTICASONE PROPIONATE 1 SPRAY: 50 SPRAY, METERED NASAL at 13:24

## 2025-07-16 RX ADMIN — CLOPIDOGREL BISULFATE 75 MG: 75 TABLET, FILM COATED ORAL at 13:24

## 2025-07-16 RX ADMIN — HEPARIN SODIUM 3200 UNITS: 1000 INJECTION INTRAVENOUS; SUBCUTANEOUS at 11:52

## 2025-07-16 RX ADMIN — ONDANSETRON 4 MG: 2 INJECTION INTRAMUSCULAR; INTRAVENOUS at 11:00

## 2025-07-16 RX ADMIN — BUDESONIDE 0.5 MG: 0.5 SUSPENSION RESPIRATORY (INHALATION) at 20:07

## 2025-07-16 RX ADMIN — ASPIRIN 81 MG: 81 TABLET, COATED ORAL at 13:24

## 2025-07-16 RX ADMIN — ROSUVASTATIN CALCIUM 10 MG: 5 TABLET, FILM COATED ORAL at 21:12

## 2025-07-16 RX ADMIN — PANTOPRAZOLE SODIUM 40 MG: 40 TABLET, DELAYED RELEASE ORAL at 17:34

## 2025-07-16 RX ADMIN — CARVEDILOL 6.25 MG: 6.25 TABLET, FILM COATED ORAL at 13:24

## 2025-07-16 RX ADMIN — ACETAMINOPHEN 650 MG: 325 TABLET ORAL at 05:08

## 2025-07-16 NOTE — PLAN OF CARE
Goal Outcome Evaluation:              Outcome Evaluation: transferred from PCU to 4NT at start of shift. alert and oriented x4. vss on 2L NC. medicated x1 for c/o pain. skin care provided. no new issues/needs at this time.

## 2025-07-16 NOTE — PROGRESS NOTES
[facility]   Hospitalist Progress Note  Date: 2025  Patient Name: Charlette Rai  : 1935  MRN: [id number]  Date of admission: 2025    Subjective   Subjective     Chief Complaint:   Fall, weakness    Summary:   89-year-old female with a past medical history significant for peripheral arterial disease, renal artery stenosis status post stenting, hypertension, ESRD on HD, diastolic CHF, COPD, lung cancer on chemotherapy, type 2 diabetes mellitus, reportedly patient has been having significant diarrhea for several days, has not eaten for couple days, had been getting IV fluids with dialysis because of hypotension, patient was recently in the hospital from  to 7/3/2025 at that time she came in with complaint of hematemesis and worsening weakness.  Patient had EGD which showed esophagitis and gastritis.  Patient did receive a unit of blood at that time.  Patient was restarted on aspirin at discharge.  Of note on 2025 patient had a fall and acute periprosthetic femoral fracture and was sent to [facility] for repair.  In the emergency department the patient was found to be hypotensive, was 78% on room air, chest x-ray showed volume loss and haziness throughout the left hemithorax.  Of note patient does have a history of lung cancer and left upper lobe ectomy patient underwent CT scan of the head which was negative for any acute abnormalities however did have some chronic small vessel ischemic changes.  Patient was found to have UTI via UA.  Patient was admitted to the hospital for further workup and management.  Patient initially improved in her mental status, she was alert and oriented, complaining of pain, she did tolerate pain medications well. On the morning of 2025 patient more altered, drowsy, states she is just not feeling right.  There was some concern for some possible left upper extremity weakness although this was not noted by teleneurology as they felt the exam was  symmetric.  Stroke RRT was called and CTA showed right ICA 60% stenosis due to plaque.  Per patient this is chronic.  MRI of the brain came back negative.  Neuro signed off episode appears to be metabolic likely medications..    Interval Followup:   Vital signs stable on 1 L with 97% saturation.  Does use oxygen at home.  Had hemodialysis today.  Awake alert oriented x 3.  Complaining of left hip pain.  Asking about pain medications  Feels weak.  No diarrhea    Objective   Objective     Vitals:   Temp:  [97.3 °F (36.3 °C)-98.1 °F (36.7 °C)] 97.5 °F (36.4 °C)  Heart Rate:  [] 81  Resp:  [16-18] 18  BP: (107-171)/(38-65) 147/38  Flow (L/min) (Oxygen Therapy):  [2] 2    Physical Exam   GEN: No acute distress, resting comfortably in bed  HEENT: Moist mucous membranes  LUNGS: Equal chest rise bilaterally  CARDIAC: 2/6 systolic murmur regular rate and rhythm  NEURO: Moving all 4 extremities spontaneously  SKIN: Multiple skin excoriations    Result Review    Result Review:  I have personally reviewed the results as below  [x]  Laboratory CBC, CMP personally reviewed  CBC          7/14/2025    04:39 7/15/2025    05:27 7/16/2025    04:26   CBC   WBC 8.34  8.80     RBC 2.34  2.43     Hemoglobin 7.4  7.7  7.6    Hematocrit 25.5  27.1  25.8    .0  111.5     MCH 31.6  31.7     MCHC 29.0  28.4     RDW 19.2  19.3     Platelets 96  98       CMP          7/14/2025    04:39 7/15/2025    05:27 7/16/2025    04:26   CMP   Glucose 85  81  99    BUN 44.5  34.2  43.2    Creatinine 3.21  2.57  2.97    EGFR 13.4  17.5  14.7    Sodium 136  131  128    Potassium 3.8  4.2  4.1    Chloride 104  100  97    Calcium 7.5  8.0  8.0    Total Protein 4.9  5.3     Albumin 2.1  2.3     Globulin 2.8  3.0     Total Bilirubin 0.3  0.4     Alkaline Phosphatase 231  243     AST (SGOT) 60  68     ALT (SGPT) 38  42     Albumin/Globulin Ratio 0.8  0.8     BUN/Creatinine Ratio 13.9  13.3  14.5    Anion Gap 11.4  7.3  10.1      []  Microbiology  []   Radiology  []  EKG/Telemetry   []  Cardiology/Vascular   []  Pathology  []  Old records  []  Other:    Scheduled medications:  arformoterol, 15 mcg, Nebulization, BID - RT  aspirin, 81 mg, Oral, Daily  budesonide, 0.5 mg, Nebulization, BID - RT  carvedilol, 6.25 mg, Oral, BID With Meals  [Held by provider] cetirizine, 10 mg, Oral, Daily  clopidogrel, 75 mg, Oral, Daily  epoetin mita/mita-epbx, 30,000 Units, Subcutaneous, Weekly  fluticasone, 1 spray, Nasal, Daily  [Held by provider] gabapentin, 100 mg, Oral, BID  [Held by provider] isosorbide mononitrate, 30 mg, Oral, Nightly  levothyroxine, 50 mcg, Oral, Q AM  [Held by provider] losartan, 25 mg, Oral, Q24H  pantoprazole, 40 mg, Oral, BID AC  rosuvastatin, 10 mg, Oral, Nightly  sodium chloride, 10 mL, Intravenous, Q12H  sodium chloride, 10 mL, Intravenous, Q12H      As needed medications:    acetaminophen **OR** acetaminophen **OR** acetaminophen    senna-docusate sodium **AND** polyethylene glycol **AND** bisacodyl **AND** bisacodyl    dextrose    dextrose    glucagon (human recombinant)    heparin (porcine)    hydrALAZINE    ipratropium-albuterol    ondansetron ODT **OR** ondansetron    oxyCODONE    sodium chloride    sodium chloride    sodium chloride    sodium chloride    sodium chloride  Infusions:            Assessment & Plan   Assessment / Plan     Assessment:  UTI with altered mental status.  Finished antibiotics  Peripheral vascular disease and renal artery stenosis status post PTA and stent on 6/14/2024 on aspirin and Plavix  Hypotension  Anemia.  Stable  Diarrhea  End-stage renal disease on hemodialysis via right IJ tunnel dialysis catheter on MWF by Dr. García  Severe protein calorie malnutrition  Chronic diastolic heart failure not in acute exacerbation  Hypothyroidism  Chronic hypoxemia on home oxygen  Severe protein calorie malnutrition  Recent left femoral fracture with repair at [facility].  Aortic stenosis  Mild transaminitis    Plan:  Patient admitted  to the hospital for further workup and management of above  Completed cefepime, will continue to monitor mental status off as this may have contributed to patient's change in mental status  Noted CTA head and neck and discussed with patient  Teleneurology consulted.  Appreciate input.  Signed off  Cultures reviewed and negative  Discontinue Dilaudid as this may be contributing to patient's altered mental status.  Resumed low-dose oxycodone for pain control  C. difficile negative  FOBT still pending no bowel movements noted  Continue aspirin and Plavix and monitor hemoglobin closely, 7/13/2025  Continue Protonix  Trend H&H.  May need blood transfusion  Enteric panel and bacterial stool panel ordered and still pending  Nephrology consulted for dialysis needs.  Appreciate input  Nutritional consult for severe protein calorie malnutrition  Resume appropriate home medications.  Resume some of the blood pressure medications.  CBC, CMP reviewed 7/16/2025   Bowel regimen  Transferred to regular floor with Flex monitor  PT OT.     Reviewed patient's labs and imaging, and discussed with patient and nurse at bedside.  [facility]  [facility]    VTE Prophylaxis:  Pharmacologic & mechanical VTE prophylaxis orders are present.    Time spent: Time spent involving patient care including face-to-face encounter 51 minutes    CODE STATUS:   Code Status (Patient has no pulse and is not breathing): CPR (Attempt to Resuscitate)  Medical Interventions (Patient has pulse or is breathing): Full Support      Electronically signed by Patrick Turcios MD, 07/16/25, 5:23 PM EDT.

## 2025-07-16 NOTE — SIGNIFICANT NOTE
07/16/25 1040   Rehab Time/Intention   Session Not Performed patient unavailable for treatment  (dialysis)   Recommendations   SLP - Next Appointment 07/17/25

## 2025-07-16 NOTE — PLAN OF CARE
Goal Outcome Evaluation:  Plan of Care Reviewed With: patient        Progress: no change  Outcome Evaluation: Patient complained of pain and nausea, medicated per orders, vitals stable, bp on the soft side, had dialysis today, q2hr turns in bed.

## 2025-07-16 NOTE — THERAPY TREATMENT NOTE
Patient Name: Charlette Rai  : 1935    MRN: [id number]                              Today's Date: 2025       Admit Date: 2025    Visit Dx:     ICD-10-CM ICD-9-CM   1. Acute UTI  N39.0 599.0   2. Altered mental status, unspecified altered mental status type  R41.82 780.97   3. Oropharyngeal dysphagia  R13.12 787.22   4. Decreased activities of daily living (ADL)  Z78.9 V49.89   5. Difficulty walking  R26.2 719.7     Patient Active Problem List   Diagnosis    Malignant neoplasm of upper lobe of right lung    Rheumatoid arthritis    Asthma    Chronic heart failure with preserved ejection fraction    Essential hypertension    GERD (gastroesophageal reflux disease)    Hyperlipidemia LDL goal <70    Lumbar spinal stenosis    Migraine    Monoclonal paraproteinemia    Osteopenia    Oxygen dependent    Peripheral neuropathy    Stage 4 chronic kidney disease    Vitamin D deficiency    Carotid artery stenosis    Chronic right-sided thoracic back pain    Peripheral vascular disease of lower extremity    Ex-smoker    De Quervain's tenosynovitis    Arthritis of carpometacarpal (CMC) joint of right thumb    Steal syndrome of dialysis vascular access    Anemia of chronic renal failure, stage 4 (severe)    Aortic stenosis, moderate    Hematoma    End stage renal disease on dialysis    Coronary artery calcification seen on CT scan    Frailty syndrome in geriatric patient    COPD with lower respiratory infection    Coagulation defect, unspecified    Hyperphosphatemia    Hypothyroidism (acquired)    Idiopathic osteoarthritis    Secondary multiple arthritis    Type 2 diabetes mellitus with diabetic peripheral angiopathy without gangrene    Renal artery stenosis    S/P renal artery angioplasty    Atherosclerosis of renal artery    Abnormal serum immunoelectrophoresis    NSVT (nonsustained ventricular tachycardia)    Hypertensive heart and chronic kidney disease with heart failure and with stage 5 chronic kidney  disease, or end stage renal disease    Unspecified protein-calorie malnutrition    IFG (impaired fasting glucose)    Iron deficiency anemia    AV graft malfunction, initial encounter    AV graft malfunction    Weakness    Malignant neoplasm of lower lobe of left lung    Malignant neoplasm of lower lobe, right bronchus or lung    Severe protein-calorie malnutrition     Past Medical History:   Diagnosis Date    Allergic rhinitis     Anaphylactic shock, unspecified, initial encounter 12/05/2023    Anemia     Arthritis     Asthma     USE INHALERS AND NEBULIZERS    Back pain     Bladder disorder     Cancer     LEFT LUNG CANCER 2005 SURGERY AND CHEMO DONE  AND CURRENTLY 4/ 14/22  RIGHT LUNG CANCER HAS ONLY RECEIVED RADIATION THUS FAR    Chronic kidney disease     stage 3    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 DENIES CP BUT DOES HAVE SOA CHRONIC ISSUE COPD/LUNG CANCER    COPD (chronic obstructive pulmonary disease)     FOLLOWED BY DR MARIAJOSE BAILEY    Coronary artery disease     DENIES CP BUT DOES GET SOA MOST OF THE TIME WITH EXERTION BUT OCC AT REST CHRONIC ISSUE COPD/LUNG CANCER    Deep vein thrombosis     Diabetes mellitus     DOES NOT CHECK BS DAILY    Disease of thyroid gland     HYPOTHYROIDISM    Essential hypertension     Gastric ulcer     GERD (gastroesophageal reflux disease)     Heart murmur     History of transfusion     NO ISSUES POST TRANSFUSION WAS MANY YEARS AGO    Hyperlipemia     Leukocytopenia     FOLLOWED BY DR MIR BAILEY    Limb swelling     Lumbago     Lumbar spinal stenosis     Lung cancer     Migraine headache     Multiple joint pain     On home oxygen therapy     4L/NC PRN    Osteopenia     PNA (pneumonia) 05/04/2024    Pseudomonas pneumonia 04/29/2024    Reflux esophagitis     Shortness of breath     Thyroid nodule     HAS ULTRASOUND  YEARLY BEING MONITORED    Vascular disease     Vitamin D deficiency      Past Surgical History:   Procedure Laterality Date    ABDOMINAL SURGERY      ANGIOPLASTY RENAL ARTERY Left 06/14/2024    stent placement    APPENDECTOMY      ARTERIOGRAM N/A 06/14/2024    Procedure: Arteriogram, right radial access, aortogram and renal arteriogram with possible intervention.;  Surgeon: Dio Miguel MD;  Location: [facility] CATH INVASIVE LOCATION;  Service: Peripheral Vascular;  Laterality: N/A;    ARTERIOVENOUS FISTULA/SHUNT SURGERY Left 05/10/2022    Procedure: Left basilic vein transposition;  Surgeon: Wan Barksdale MD;  Location: [facility] MAIN OR;  Service: Vascular;  Laterality: Left;    ARTERIOVENOUS FISTULA/SHUNT SURGERY Left 03/23/2023    Procedure: Ligation of left arm arteriovenous fistula;  Surgeon: Wan Barksdale MD;  Location: [facility] MAIN OR;  Service: Vascular;  Laterality: Left;    ARTERIOVENOUS FISTULA/SHUNT SURGERY Left 11/30/2023    Procedure: Creation of left arm arteriovenous graft;  Surgeon: Wan Barksdale MD;  Location: [facility] MAIN OR;  Service: Vascular;  Laterality: Left;    BRONCHOSCOPY N/A 04/24/2024    Procedure: BRONCHOSCOPY WITH BAL AND WASHINGS;  Surgeon: Khalif Yanez MD;  Location: [facility] ENDOSCOPY;  Service: Pulmonary;  Laterality: N/A;  MUCUS PLUGGING    CARDIAC CATHETERIZATION  1996    CARDIAC SURGERY      CARDIAC SURGERY      fluid drained from heart    CATARACT EXTRACTION, BILATERAL  2003    COLONOSCOPY  2014    ENDOSCOPY  2016    2019    ENDOSCOPY N/A 06/25/2025    Procedure: ESOPHAGOGASTRODUODENOSCOPY;  Surgeon: Tanvi Del Valle MD;  Location: [facility] ENDOSCOPY;  Service: Gastroenterology;  Laterality: N/A;  gastritis, esophagitis    FEMORAL ARTERY STENT Bilateral     HYSTERECTOMY      LEG THROMBECTOMY/EMBOLECTOMY Left 01/29/2025    Procedure: LEFT LOWER EXTREMITY ANGIOGRAM;  Surgeon: Jaden Castañeda MD;  Location: [facility] HYBRID OR;  Service: Vascular;  Laterality: Left;    LUNG  BIOPSY Left 2005    lobectomy upper lung caner    LUNG VOLUME REDUCTION      OTHER SURGICAL HISTORY      artifical joints/limbs    REPLACEMENT TOTAL KNEE Left 2016    SHUNT O GRAM N/A 01/13/2025    Procedure: dialysis shuntogram;  Surgeon: Court Valente MD;  Location: [facility] INVASIVE LOCATION;  Service: Peripheral Vascular;  Laterality: N/A;    UPPER GASTROINTESTINAL ENDOSCOPY        General Information       Row Name 07/16/25 0901          OT Time and Intention    Document Type therapy note (daily note)  -     Mode of Treatment individual therapy;occupational therapy  -     Patient Effort adequate  -       Row Name 07/16/25 0901          General Information    Patient Profile Reviewed yes  -       Row Name 07/16/25 0901          Occupational Profile    Reason for Services/Referral (Occupational Profile) Patient due for re evaluation as patient had stroke RRT; however, MRI negative and patient presents as she did on evaluation.  -       Row Name 07/16/25 0901          Safety Issues/Impairments Affecting Functional Mobility    Impairments Affecting Function (Mobility) balance;endurance/activity tolerance;pain;strength  -               User Key  (r) = Recorded By, (t) = Taken By, (c) = Cosigned By      Initials Name Provider Type    AC Luh Sears OT Occupational Therapist                     Mobility/ADL's       Row Name 07/16/25 0906          Bed Mobility    Bed Mobility bed mobility (all) activities  -     All Activities, [place] (Bed Mobility) maximum assist (25% patient effort);1 person assist;verbal cues;nonverbal cues (demo/gesture)  -     Comment, (Bed Mobility) patient sat at EOB with CGA , stated she did feel dizzy/nauseous  -       Row Name 07/16/25 0906          Transfers    Comment, (Transfers) attempted sit to stand with rolling walker, however patient stated it was too painful for RLE, unable.  -       Row Name 07/16/25 0906          Functional Mobility    Functional  Mobility- Ind. Level unable to perform  -AC               User Key  (r) = Recorded By, (t) = Taken By, (c) = Cosigned By      Initials Name Provider Type    AC Luh Sears OT Occupational Therapist                   Obj/Interventions       Row Name 07/16/25 0912          Sensory Assessment (Somatosensory)    Sensory Assessment (Somatosensory) UE sensation intact  -       Row Name 07/16/25 0912          Vision Assessment/Intervention    Visual Impairment/Limitations WFL  -AC       Row Name 07/16/25 0912          Range of Motion Comprehensive    General Range of Motion bilateral upper extremity ROM WFL  -AC       Row Name 07/16/25 0912          Strength Comprehensive (MMT)    Comment, General Manual Muscle Testing (MMT) Assessment BUE 4/5  -AC       Row Name 07/16/25 0912          Motor Skills    Motor Skills coordination;functional endurance  -AC     Coordination WFL  -     Functional Endurance poor  -AC       Row Name 07/16/25 0912          Balance    Balance Assessment sitting static balance  -AC     Static Sitting Balance contact guard  -AC     Position, Sitting Balance supported  -AC     Balance Interventions sitting;supported;minimal challenge;static;occupation based/functional task  -               User Key  (r) = Recorded By, (t) = Taken By, (c) = Cosigned By      Initials Name Provider Type    AC Luh Sears OT Occupational Therapist                   Goals/Plan    No documentation.                  Clinical Impression       Row Name 07/16/25 0914          Pain Assessment    Pretreatment Pain Rating 0/10 - no pain  -AC     Posttreatment Pain Rating 2/10  -AC     Pain Location extremity  -AC     Pain Side/Orientation right;lower  -AC       Row Name 07/16/25 0914          Plan of Care Review    Plan of Care Reviewed With patient  -AC     Progress no change  -AC     Outcome Evaluation Patient seen for treatment only as patient did not have change in status and MRI was negative for stroke. Patient  actually demonstrated improved sitting balance at EOB this treatment. Patient to continue with therapy in order to maximize independence with adls.  -       Row Name 07/16/25 0914          Positioning and Restraints    Pre-Treatment Position in bed  -AC     Post Treatment Position bed  -AC     In Bed fowlers;call light within reach;encouraged to call for assist;exit alarm on  -AC               User Key  (r) = Recorded By, (t) = Taken By, (c) = Cosigned By      Initials Name Provider Type    Luh Alves OT Occupational Therapist                   Outcome Measures       Row Name 07/16/25 0917          How much help from another is currently needed...    Putting on and taking off regular lower body clothing? 1  -AC     Bathing (including washing, rinsing, and drying) 2  -AC     Toileting (which includes using toilet bed pan or urinal) 1  -AC     Putting on and taking off regular upper body clothing 2  -AC     Taking care of personal grooming (such as brushing teeth) 2  -AC     Eating meals 1  -AC     AM-PAC 6 Clicks Score (OT) 9  -AC       Row Name 07/16/25 0917          Functional Assessment    Outcome Measure Options AM-PAC 6 Clicks Daily Activity (OT);Optimal Instrument  -       Row Name 07/16/25 0917          Optimal Instrument    Optimal Instrument Optimal - 3  -AC     Bending/Stooping 5  -AC     Standing 5  -AC     Reaching 2  -AC               User Key  (r) = Recorded By, (t) = Taken By, (c) = Cosigned By      Initials Name Provider Type    Luh Alves OT Occupational Therapist                      OT Recommendation and Plan     Plan of Care Review  Plan of Care Reviewed With: patient  Progress: no change  Outcome Evaluation: Patient seen for treatment only as patient did not have change in status and MRI was negative for stroke. Patient actually demonstrated improved sitting balance at EOB this treatment. Patient to continue with therapy in order to maximize independence with adls.     Time  Calculation:         Time Calculation- OT       Row Name 07/16/25 0917             Time Calculation- OT    OT Received On 07/16/25  -AC         Timed Charges    36624 - OT Therapeutic Activity Minutes 18  -AC         Total Minutes    Timed Charges Total Minutes 18  -AC       Total Minutes 18  -AC                User Key  (r) = Recorded By, (t) = Taken By, (c) = Cosigned By      Initials Name Provider Type     Luh Sears OT Occupational Therapist                  Therapy Charges for Today       Code Description Service Date Service Provider Modifiers Qty    [id number]  OT THERAPEUTIC ACT EA 15 MIN 7/16/2025 Luh Sears OT GO 1                 Luh Sears OT  7/16/2025

## 2025-07-16 NOTE — PROGRESS NOTES
[facility]     Nephrology Progress Note      Patient Name: Charlette Rai  : 1935  MRN: [id number]  Primary Care Physician:  Brennan Mosqueda MD  Date of admission: 2025    Subjective   Subjective     Interval History:  Pt was seen this morning in dialysis.  Tired looking.  In pain.  Shortness of breath is about the same.  Blood pressure is adequate.      Review of Systems   All systems were reviewed and negative except for: What is mentioned above.    Objective   Objective     Vitals:   Temp:  [97.3 °F (36.3 °C)-98.1 °F (36.7 °C)] 97.5 °F (36.4 °C)  Heart Rate:  [] 81  Resp:  [16-18] 18  BP: (107-171)/(38-65) 147/38  Flow (L/min) (Oxygen Therapy):  [2] 2  Physical Exam:   Constitutional: Awake, alert, uncomfortable, in pain.  Tired looking.   Respiratory: Decreased to auscultation bilaterally, nonlabored respirations    Cardiovascular: RRR, 2/6 systolic ejection murmur   gastrointestinal: Positive bowel sounds, soft, nontender, nondistended   Musculoskeletal: 1+ lower extremity swelling no cyanosis   Psychiatric: Appropriate affect, cooperative   Neurologic: Oriented, moving all extremities,   Skin: Some bruising  Chest: Right IJ TDC in place.    Result Review    Result Reviewed:  I have personally reviewed the results from the time of this admission to 2025 16:43 EDT and agree with these findings:  [x]  Laboratory  []  Microbiology  [x]  Radiology  []  EKG/Telemetry   []  Cardiology/Vascular   []  Pathology  [x]  Old records  []  Other:        Lab 25  0426 07/15/25  0527 25  0439 25  0440 25  0435 25  0425 07/10/25  043   SODIUM 128* 131* 136 133* 135* 135* 134*   POTASSIUM 4.1 4.2 3.8 4.1 3.8 3.4* 3.5   CHLORIDE 97* 100 104 103 102 99 99   CO2 20.9* 23.7 20.6* 19.5* 23.6 23.8 23.9   BUN 43.2* 34.2* 44.5* 34.6* 26.6* 39.5* 32.1*   CREATININE 2.97* 2.57* 3.21* 2.79* 2.29* 3.31* 2.63*   GLUCOSE 99 81 85 99 103* 71 86   EGFR 14.7* 17.5* 13.4* 15.9*  20.1* 12.9* 17.0*   ANION GAP 10.1 7.3 11.4 10.5 9.4 12.2 11.1   MAGNESIUM  --  1.9 2.0 1.9 1.8 1.9 1.9   PHOSPHORUS  --  3.3 3.4 3.3 2.9 4.2 3.4             Assessment & Plan   Assessment / Plan       Active Hospital Problems:  There are no active hospital problems to display for this patient.      Assessment and Plan:      - ESRD, dialysis through right IJ TDC.  Was at [facility] receiving daily dialysis.  Here dialysis MWF schedule. Adjusted to her labs.  Electrolytes and volume status are adequate.     - Recent fall and left hip fracture, status post repair.     - Severe anemia, and recent GI bleed.  Elevated ferritin.  Will continue Epogen with dialysis.  Status post recent EGD and transfusion.     - Hypotension, likely secondary to volume depletion, better now.     - Secondary hyperparathyroidism and hyperphosphatemia, being addressed with dialysis.  Low phosphorus diet.  Sevelamer on hold.  Phosphorus normal currently     - Severe peripheral arterial disease including lower extremities and renal artery stenosis, status post PTA and stent 6/14/2024.  Back on Plavix and baby aspirin.     - Lung cancer.  Status post radiation.     - Type 2 diabetes with complications, per primary.    - Altered mentation, neurology following, head CT and neurovascular imaging noted likely cefepime neurotoxicity.  Slowly better.    Discussed with dialysis staff.  Will follow.

## 2025-07-17 ENCOUNTER — APPOINTMENT (OUTPATIENT)
Dept: CT IMAGING | Facility: HOSPITAL | Age: 89
DRG: 689 | End: 2025-07-17
Payer: MEDICARE

## 2025-07-17 ENCOUNTER — APPOINTMENT (OUTPATIENT)
Facility: HOSPITAL | Age: 89
DRG: 689 | End: 2025-07-17
Payer: MEDICARE

## 2025-07-17 LAB
ARTERIAL PATENCY WRIST A: POSITIVE
ATMOSPHERIC PRESS: 741.9 MMHG
BASE EXCESS BLDA CALC-SCNC: -0.3 MMOL/L (ref -2–2)
BDY SITE: ABNORMAL
BH CV UPPER DUPLEX RIGHT AXILLARY ART EDV: 0 CM/S
BH CV UPPER DUPLEX RIGHT AXILLARY ARTERY PSV: 86.5 CM/S
BH CV UPPER DUPLEX RIGHT BRACHIA ART EDV: 0 CM/S
BH CV UPPER DUPLEX RIGHT BRACHIAL ART PSV: 118.3 CM/S
BH CV UPPER DUPLEX RIGHT RADIAL ART EDV: 0 CM/S
BH CV UPPER DUPLEX RIGHT RADIAL ART PSV: 29.4 CM/S
BH CV UPPER DUPLEX RIGHT SUBCLAVIAN ART EDV: 0 CM/S
BH CV UPPER DUPLEX RIGHT SUBCLAVIAN ART PSV: 81.6 CM/S
BH CV UPPER DUPLEX RIGHT ULNAR ART EDV: 0 CM/S
BH CV UPPER DUPLEX RIGHT ULNAR ART PSV: 84.4 CM/S
C COLI+JEJ+UPSA DNA STL QL NAA+NON-PROBE: NOT DETECTED
CRYPTOSP DNA STL QL NAA+NON-PROBE: NOT DETECTED
E HISTOLYT DNA STL QL NAA+NON-PROBE: NOT DETECTED
EC STX1+STX2 GENES STL QL NAA+NON-PROBE: NOT DETECTED
G LAMBLIA DNA STL QL NAA+NON-PROBE: NOT DETECTED
GAS FLOW AIRWAY: 2 LPM
HCO3 BLDA-SCNC: 25.6 MMOL/L (ref 22–26)
HCT VFR BLD AUTO: 25.9 % (ref 34–46.6)
HCT VFR BLD CALC: 23 % (ref 38–51)
HEMOCCULT STL QL IA: POSITIVE
HEMODILUTION: NO
HGB BLD-MCNC: 7.5 G/DL (ref 12–15.9)
HGB BLDA-MCNC: 7.8 G/DL (ref 12–18)
HOLD SPECIMEN: NORMAL
MODALITY: ABNORMAL
PCO2 BLDA: 47.7 MM HG (ref 35–45)
PH BLDA: 7.34 PH UNITS (ref 7.35–7.45)
PO2 BLDA: 88.4 MM HG (ref 80–100)
S ENT+BONG DNA STL QL NAA+NON-PROBE: NOT DETECTED
SAO2 % BLDCOA: 96.1 % (ref 95–99)
SHIGELLA SP+EIEC IPAH ST NAA+NON-PROBE: NOT DETECTED

## 2025-07-17 PROCEDURE — 25010000002 NALOXONE PER 1 MG: Performed by: INTERNAL MEDICINE

## 2025-07-17 PROCEDURE — 85018 HEMOGLOBIN: CPT | Performed by: INTERNAL MEDICINE

## 2025-07-17 PROCEDURE — 92526 ORAL FUNCTION THERAPY: CPT

## 2025-07-17 PROCEDURE — 25810000003 SODIUM CHLORIDE 0.9 % SOLUTION

## 2025-07-17 PROCEDURE — 82274 ASSAY TEST FOR BLOOD FECAL: CPT | Performed by: INTERNAL MEDICINE

## 2025-07-17 PROCEDURE — 25010000002 EPOETIN ALFA PER 1000 UNITS: Performed by: INTERNAL MEDICINE

## 2025-07-17 PROCEDURE — 94799 UNLISTED PULMONARY SVC/PX: CPT

## 2025-07-17 PROCEDURE — 36600 WITHDRAWAL OF ARTERIAL BLOOD: CPT

## 2025-07-17 PROCEDURE — 93931 UPPER EXTREMITY STUDY: CPT

## 2025-07-17 PROCEDURE — 93931 UPPER EXTREMITY STUDY: CPT | Performed by: SURGERY

## 2025-07-17 PROCEDURE — 85014 HEMATOCRIT: CPT | Performed by: INTERNAL MEDICINE

## 2025-07-17 PROCEDURE — 87506 IADNA-DNA/RNA PROBE TQ 6-11: CPT | Performed by: INTERNAL MEDICINE

## 2025-07-17 PROCEDURE — 82803 BLOOD GASES ANY COMBINATION: CPT

## 2025-07-17 PROCEDURE — 70450 CT HEAD/BRAIN W/O DYE: CPT

## 2025-07-17 PROCEDURE — 99233 SBSQ HOSP IP/OBS HIGH 50: CPT | Performed by: INTERNAL MEDICINE

## 2025-07-17 RX ORDER — NALOXONE HCL 0.4 MG/ML
0.4 VIAL (ML) INJECTION ONCE
Status: COMPLETED | OUTPATIENT
Start: 2025-07-17 | End: 2025-07-17

## 2025-07-17 RX ORDER — NALOXONE HCL 0.4 MG/ML
0.4 VIAL (ML) INJECTION ONCE
Status: DISCONTINUED | OUTPATIENT
Start: 2025-07-17 | End: 2025-07-18 | Stop reason: HOSPADM

## 2025-07-17 RX ADMIN — ARFORMOTEROL TARTRATE 15 MCG: 15 SOLUTION RESPIRATORY (INHALATION) at 21:18

## 2025-07-17 RX ADMIN — ERYTHROPOIETIN 30000 UNITS: 20000 INJECTION, SOLUTION INTRAVENOUS; SUBCUTANEOUS at 10:50

## 2025-07-17 RX ADMIN — NALOXONE HYDROCHLORIDE 0.4 MG: 0.4 INJECTION, SOLUTION INTRAMUSCULAR; INTRAVENOUS; SUBCUTANEOUS at 09:00

## 2025-07-17 RX ADMIN — SODIUM CHLORIDE 500 ML: 9 INJECTION, SOLUTION INTRAVENOUS at 21:14

## 2025-07-17 RX ADMIN — LEVOTHYROXINE SODIUM 50 MCG: 0.05 TABLET ORAL at 06:02

## 2025-07-17 RX ADMIN — NALOXONE HYDROCHLORIDE 0.4 MG: 0.4 INJECTION, SOLUTION INTRAMUSCULAR; INTRAVENOUS; SUBCUTANEOUS at 09:09

## 2025-07-17 RX ADMIN — BUDESONIDE 0.5 MG: 0.5 SUSPENSION RESPIRATORY (INHALATION) at 21:18

## 2025-07-17 RX ADMIN — ACETAMINOPHEN 650 MG: 325 TABLET ORAL at 03:55

## 2025-07-17 RX ADMIN — Medication 10 ML: at 10:51

## 2025-07-17 NOTE — SIGNIFICANT NOTE
07/17/25 1042   Physical Therapy Time and Intention   Session Not Performed unable to treat, medical status change;patient unavailable for treatment   Comment, Session Not Performed Pt was out of the room for a head CT on first attempt.  CT was ordered after pt receiving 2 doses of Narcan this morning per RN report.  Pt was checked after she returned from CT, but is too lethargic, unable to keep eyes open or speak with PTA.  Treatment deferred this session.

## 2025-07-17 NOTE — PLAN OF CARE
Goal Outcome Evaluation:  Plan of Care Reviewed With: patient        Progress: declining  Outcome Evaluation: pt lethargic this AM, unresponsive to painful stimuli, md notified ordered narcan, two doses given, head ct and abgs. right upper extrementy ice cold to touch duplex ordered and vascular consulted. pt is aox3 currently and able to hold conversation. q2 turned. skin care provided, incontinent. falls precautions in place. call light in reach

## 2025-07-17 NOTE — PROGRESS NOTES
[facility]     Nephrology Progress Note      Patient Name: Charlette Rai  : 1935  MRN: [id number]  Primary Care Physician:  Brennan Mosqueda MD  Date of admission: 2025    Subjective   Subjective     Interval History:  Pt very somnolent today, difficult to wake up.  Appears comfortable      Objective   Objective     Vitals:   Temp:  [97.5 °F (36.4 °C)-97.8 °F (36.6 °C)] 97.5 °F (36.4 °C)  Heart Rate:  [71-92] 75  Resp:  [16-20] 20  BP: (107-171)/(31-57) 152/47  Flow (L/min) (Oxygen Therapy):  [2] 2  Physical Exam:   Constitutional: Somnolent  Tired looking.   Respiratory: Decreased to auscultation bilaterally, nonlabored respirations    Cardiovascular: RRR, 2/6 systolic ejection murmur   gastrointestinal: Positive bowel sounds, soft, nontender, nondistended   Musculoskeletal: 1+ lower extremity swelling no cyanosis   Psychiatric: Appropriate affect, cooperative   Neurologic: Oriented, moving all extremities,   Skin: Some bruising  Chest: Right IJ TDC in place.    Result Review    Result Reviewed:  I have personally reviewed the results from the time of this admission to 2025 09:24 EDT and agree with these findings:  [x]  Laboratory  []  Microbiology  [x]  Radiology  []  EKG/Telemetry   []  Cardiology/Vascular   []  Pathology  [x]  Old records  []  Other:        Lab 25  0426 07/15/25  0527 25  0439 25  0440 25  0435 25  0425   SODIUM 128* 131* 136 133* 135* 135*   POTASSIUM 4.1 4.2 3.8 4.1 3.8 3.4*   CHLORIDE 97* 100 104 103 102 99   CO2 20.9* 23.7 20.6* 19.5* 23.6 23.8   BUN 43.2* 34.2* 44.5* 34.6* 26.6* 39.5*   CREATININE 2.97* 2.57* 3.21* 2.79* 2.29* 3.31*   GLUCOSE 99 81 85 99 103* 71   EGFR 14.7* 17.5* 13.4* 15.9* 20.1* 12.9*   ANION GAP 10.1 7.3 11.4 10.5 9.4 12.2   MAGNESIUM  --  1.9 2.0 1.9 1.8 1.9   PHOSPHORUS  --  3.3 3.4 3.3 2.9 4.2             Assessment & Plan   Assessment / Plan       Active Hospital Problems:  There are no active hospital  problems to display for this patient.      Assessment and Plan:      - ESRD, dialysis through right IJ TDC.  Was at [facility] receiving daily dialysis.  Here dialysis MWF schedule. Adjusted to her labs.  Electrolytes and volume status are adequate.     - Recent fall and left hip fracture, status post repair.     - Severe anemia, and recent GI bleed.  Elevated ferritin.  Will continue Epogen with dialysis.  Status post recent EGD and transfusion.     - Hypotension, likely secondary to volume depletion, better now.     - Secondary hyperparathyroidism and hyperphosphatemia, being addressed with dialysis.  Low phosphorus diet.  Sevelamer on hold.  Phosphorus normal currently     - Severe peripheral arterial disease including lower extremities and renal artery stenosis, status post PTA and stent 6/14/2024.  Back on Plavix and baby aspirin.     - Lung cancer.  Status post radiation.     - Type 2 diabetes with complications, per primary.    - Altered mentation, neurology following, head CT and neurovascular imaging noted likely cefepime neurotoxicity.  Slowly better.

## 2025-07-17 NOTE — PROGRESS NOTES
[facility]   Hospitalist Progress Note  Date: 2025  Patient Name: Charlette Rai  : 1935  MRN: [id number]  Date of admission: 2025    Subjective   Subjective     Chief Complaint:   Fall, weakness    Summary:   89-year-old female with a past medical history significant for peripheral arterial disease, renal artery stenosis status post stenting, hypertension, ESRD on HD, diastolic CHF, COPD, lung cancer on chemotherapy, type 2 diabetes mellitus, reportedly patient has been having significant diarrhea for several days, has not eaten for couple days, had been getting IV fluids with dialysis because of hypotension, patient was recently in the hospital from  to 7/3/2025 at that time she came in with complaint of hematemesis and worsening weakness.  Patient had EGD which showed esophagitis and gastritis.  Patient did receive a unit of blood at that time.  Patient was restarted on aspirin at discharge.  Of note on 2025 patient had a fall and acute periprosthetic femoral fracture and [facility].  In the emergency department the patient was found to be hypotensive, was 78% on room air, chest x-ray showed volume loss and haziness throughout the left hemithorax.  Of note patient does have a history of lung cancer and left upper lobe ectomy patient underwent CT scan of the head which was negative for any acute abnormalities however did have some chronic small vessel ischemic changes.  Patient was found to have UTI via UA.  Patient was admitted to the hospital for further workup and management.  Patient initially improved in her mental status, she was alert and oriented, complaining of pain, she did tolerate pain medications well. On the morning of 2025 patient more altered, drowsy, states she is just not feeling right.  There was some concern for some possible left upper extremity weakness although this was not noted by teleneurology as they felt the exam was  symmetric.  Stroke RRT was called and CTA showed right ICA 60% stenosis due to plaque.  Per patient this is chronic.  MRI of the brain came back negative.  Neuro signed off episode appears to be metabolic likely medications..    Interval Followup:   Patient obtunded in the morning notified by nurse.  On evaluation patient noticed responding to vocal commands.  Minimal response to painful stimulus.  Patient given Narcan x 2 with improvement.  Patient had 2.5 mg oxycodone yesterday late afternoon.  Earlier in the morning patient was awake and alert asking for pills.  Stat CT head negative.  ABG not significant  Nursing staff also noted right upper extremity cold with nonpalpable radial pulse.  Vascular consulted.  Stat arterial ultrasound right upper extremity with good velocities and waveform.  Subsequently her right upper extremity became warm and back to normal.  No intervention as per vascular  Vital signs stable on 1 L with 97% saturation.  Does use oxygen at home.    After my rounds later on patient was awake alert oriented x 3.  Complaining of left hip pain.  Asking about pain medications  Feels weak.  No diarrhea.  Bloody stools noted.    Objective   Objective     Vitals:   Temp:  [97.4 °F (36.3 °C)-97.8 °F (36.6 °C)] 97.5 °F (36.4 °C)  Heart Rate:  [74-92] 82  Resp:  [16-20] 20  BP: (135-153)/(31-53) 145/43  Flow (L/min) (Oxygen Therapy):  [2] 2    Physical Exam   GEN: No acute distress, resting comfortably in bed  HEENT: Moist mucous membranes  LUNGS: Equal chest rise bilaterally  CARDIAC: 2/6 systolic murmur regular rate and rhythm  NEURO: Moving all 4 extremities spontaneously  SKIN: Multiple skin excoriations    Result Review    Result Review:  I have personally reviewed the results as below  [x]  Laboratory CBC, CMP personally reviewed  CBC          7/15/2025    05:27 7/16/2025    04:26 7/17/2025    04:36   CBC   WBC 8.80      RBC 2.43      Hemoglobin 7.7  7.6  7.5    Hematocrit 27.1  25.8  25.9    MCV  111.5      MCH 31.7      MCHC 28.4      RDW 19.3      Platelets 98        CMP          7/14/2025    04:39 7/15/2025    05:27 7/16/2025    04:26   CMP   Glucose 85  81  99    BUN 44.5  34.2  43.2    Creatinine 3.21  2.57  2.97    EGFR 13.4  17.5  14.7    Sodium 136  131  128    Potassium 3.8  4.2  4.1    Chloride 104  100  97    Calcium 7.5  8.0  8.0    Total Protein 4.9  5.3     Albumin 2.1  2.3     Globulin 2.8  3.0     Total Bilirubin 0.3  0.4     Alkaline Phosphatase 231  243     AST (SGOT) 60  68     ALT (SGPT) 38  42     Albumin/Globulin Ratio 0.8  0.8     BUN/Creatinine Ratio 13.9  13.3  14.5    Anion Gap 11.4  7.3  10.1      []  Microbiology  []  Radiology  []  EKG/Telemetry   []  Cardiology/Vascular   []  Pathology  []  Old records  []  Other:    Scheduled medications:  arformoterol, 15 mcg, Nebulization, BID - RT  aspirin, 81 mg, Oral, Daily  budesonide, 0.5 mg, Nebulization, BID - RT  carvedilol, 6.25 mg, Oral, BID With Meals  clopidogrel, 75 mg, Oral, Daily  epoetin mita/mita-epbx, 30,000 Units, Subcutaneous, Weekly  fluticasone, 1 spray, Nasal, Daily  [Held by provider] isosorbide mononitrate, 30 mg, Oral, Nightly  levothyroxine, 50 mcg, Oral, Q AM  [Held by provider] losartan, 25 mg, Oral, Q24H  naloxone, 0.4 mg, Intravenous, Once  pantoprazole, 40 mg, Oral, BID AC  rosuvastatin, 10 mg, Oral, Nightly  sodium chloride, 10 mL, Intravenous, Q12H  sodium chloride, 10 mL, Intravenous, Q12H      As needed medications:    acetaminophen **OR** acetaminophen **OR** acetaminophen    senna-docusate sodium **AND** polyethylene glycol **AND** bisacodyl **AND** bisacodyl    dextrose    dextrose    glucagon (human recombinant)    heparin (porcine)    hydrALAZINE    ipratropium-albuterol    ondansetron ODT **OR** ondansetron    [Held by provider] oxyCODONE    sodium chloride    sodium chloride    sodium chloride    sodium chloride    sodium chloride  Infusions:            Assessment & Plan   Assessment / Plan      Assessment:  UTI with altered mental status.  Finished antibiotics  Peripheral vascular disease and renal artery stenosis status post PTA and stent on 6/14/2024 on aspirin and Plavix  Hypotension  Anemia.  Stable  Diarrhea.  Improving  End-stage renal disease on hemodialysis via right IJ tunnel dialysis catheter on MWF by Dr. García  Severe protein calorie malnutrition  Chronic diastolic heart failure not in acute exacerbation  Hypothyroidism  Chronic hypoxemia on home oxygen  Severe protein calorie malnutrition  Recent left femoral fracture with repair at [facility].  Aortic stenosis  Mild transaminitis    Plan:  Discussed with vascular surgery.  Appreciate input.  Right upper extremity back to normal.  Arterial ultrasound right upper extremity negative  Stat CT of the head and ABG noted negative  Completed cefepime, will continue to monitor mental status off as this may have contributed to patient's change in mental status  Noted CTA head and neck and discussed with patient  Teleneurology consulted.  Appreciate input.  Signed off  Cultures reviewed and negative  Discontinue Dilaudid as this may be contributing to patient's altered mental status.  Hold low-dose oxycodone for pain control.  Gabapentin DC'd because of breve episodes of obtundation  C. difficile negative  FOBT positive.  Stool cultures pending  Continue aspirin and Plavix and monitor hemoglobin closely, 7/13/2025  Continue Protonix  Trend H&H.  May need blood transfusion  Enteric panel and bacterial stool panel  pending  Nephrology consulted for dialysis needs.  Appreciate input  Nutritional consult for severe protein calorie malnutrition  Resume appropriate home medications.  Resume some of the blood pressure medications.  CBC, CMP reviewed 7/17/2025   Bowel regimen  PT OT.  Perative care consulted.  Appreciate input.  Per palliative care note patient is DNR/DNI.  CODE STATUS updated     Reviewed patient's labs and imaging, and discussed with patient  and nurse at bedside.  Patient to go back to [facility] in a.m.  Patient does have chair time TTS at [facility]    VTE Prophylaxis:  Pharmacologic & mechanical VTE prophylaxis orders are present.    Time spent: Time spent involving patient care including face-to-face encounter 51 minutes    CODE STATUS:   Code Status (Patient has no pulse and is not breathing): No CPR (Do Not Attempt to Resuscitate)  Medical Interventions (Patient has pulse or is breathing): Limited Support  Medical Intervention Limits: No cardioversion   No intubation (DNI)        Electronically signed by Patrick Turcios MD, 07/17/25, 5:12 PM EDT.

## 2025-07-17 NOTE — THERAPY TREATMENT NOTE
Acute Care - Speech Language Pathology   Swallow Treatment Note MAURICE Escamilla     Patient Name: Charlette Rai  : 1935  MRN: [id number]  Today's Date: 2025               Admit Date: 2025    Visit Dx:     ICD-10-CM ICD-9-CM   1. Acute UTI  N39.0 599.0   2. Altered mental status, unspecified altered mental status type  R41.82 780.97   3. Oropharyngeal dysphagia  R13.12 787.22   4. Decreased activities of daily living (ADL)  Z78.9 V49.89   5. Difficulty walking  R26.2 719.7     Patient Active Problem List   Diagnosis    Malignant neoplasm of upper lobe of right lung    Rheumatoid arthritis    Asthma    Chronic heart failure with preserved ejection fraction    Essential hypertension    GERD (gastroesophageal reflux disease)    Hyperlipidemia LDL goal <70    Lumbar spinal stenosis    Migraine    Monoclonal paraproteinemia    Osteopenia    Oxygen dependent    Peripheral neuropathy    Stage 4 chronic kidney disease    Vitamin D deficiency    Carotid artery stenosis    Chronic right-sided thoracic back pain    Peripheral vascular disease of lower extremity    Ex-smoker    De Quervain's tenosynovitis    Arthritis of carpometacarpal (CMC) joint of right thumb    Steal syndrome of dialysis vascular access    Anemia of chronic renal failure, stage 4 (severe)    Aortic stenosis, moderate    Hematoma    End stage renal disease on dialysis    Coronary artery calcification seen on CT scan    Frailty syndrome in geriatric patient    COPD with lower respiratory infection    Coagulation defect, unspecified    Hyperphosphatemia    Hypothyroidism (acquired)    Idiopathic osteoarthritis    Secondary multiple arthritis    Type 2 diabetes mellitus with diabetic peripheral angiopathy without gangrene    Renal artery stenosis    S/P renal artery angioplasty    Atherosclerosis of renal artery    Abnormal serum immunoelectrophoresis    NSVT (nonsustained ventricular tachycardia)    Hypertensive heart and chronic kidney disease  with heart failure and with stage 5 chronic kidney disease, or end stage renal disease    Unspecified protein-calorie malnutrition    IFG (impaired fasting glucose)    Iron deficiency anemia    AV graft malfunction, initial encounter    AV graft malfunction    Weakness    Malignant neoplasm of lower lobe of left lung    Malignant neoplasm of lower lobe, right bronchus or lung    Severe protein-calorie malnutrition     Past Medical History:   Diagnosis Date    Allergic rhinitis     Anaphylactic shock, unspecified, initial encounter 12/05/2023    Anemia     Arthritis     Asthma     USE INHALERS AND NEBULIZERS    Back pain     Bladder disorder     Cancer     LEFT LUNG CANCER 2005 SURGERY AND CHEMO DONE  AND CURRENTLY 4/ 14/22  RIGHT LUNG CANCER HAS ONLY RECEIVED RADIATION THUS FAR    Chronic kidney disease     stage 3    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 DENIES CP BUT DOES HAVE SOA CHRONIC ISSUE COPD/LUNG CANCER    COPD (chronic obstructive pulmonary disease)     FOLLOWED BY DR MARIAJOSE BAILEY    Coronary artery disease     DENIES CP BUT DOES GET SOA MOST OF THE TIME WITH EXERTION BUT OCC AT REST CHRONIC ISSUE COPD/LUNG CANCER    Deep vein thrombosis     Diabetes mellitus     DOES NOT CHECK BS DAILY    Disease of thyroid gland     HYPOTHYROIDISM    Essential hypertension     Gastric ulcer     GERD (gastroesophageal reflux disease)     Heart murmur     History of transfusion     NO ISSUES POST TRANSFUSION WAS MANY YEARS AGO    Hyperlipemia     Leukocytopenia     FOLLOWED BY DR MIR BAILEY    Limb swelling     Lumbago     Lumbar spinal stenosis     Lung cancer     Migraine headache     Multiple joint pain     On home oxygen therapy     4L/NC PRN    Osteopenia     PNA (pneumonia) 05/04/2024    Pseudomonas pneumonia 04/29/2024    Reflux esophagitis      Shortness of breath     Thyroid nodule     HAS ULTRASOUND YEARLY BEING MONITORED    Vascular disease     Vitamin D deficiency      Past Surgical History:   Procedure Laterality Date    ABDOMINAL SURGERY      ANGIOPLASTY RENAL ARTERY Left 06/14/2024    stent placement    APPENDECTOMY      ARTERIOGRAM N/A 06/14/2024    Procedure: Arteriogram, right radial access, aortogram and renal arteriogram with possible intervention.;  Surgeon: Dio Miguel MD;  Location: [facility] CATH INVASIVE LOCATION;  Service: Peripheral Vascular;  Laterality: N/A;    ARTERIOVENOUS FISTULA/SHUNT SURGERY Left 05/10/2022    Procedure: Left basilic vein transposition;  Surgeon: Wan Barksdale MD;  Location: [facility] MAIN OR;  Service: Vascular;  Laterality: Left;    ARTERIOVENOUS FISTULA/SHUNT SURGERY Left 03/23/2023    Procedure: Ligation of left arm arteriovenous fistula;  Surgeon: Wan Barksdale MD;  Location: [facility] MAIN OR;  Service: Vascular;  Laterality: Left;    ARTERIOVENOUS FISTULA/SHUNT SURGERY Left 11/30/2023    Procedure: Creation of left arm arteriovenous graft;  Surgeon: Wan Barksdale MD;  Location: [facility] MAIN OR;  Service: Vascular;  Laterality: Left;    BRONCHOSCOPY N/A 04/24/2024    Procedure: BRONCHOSCOPY WITH BAL AND WASHINGS;  Surgeon: Khalif Yanez MD;  Location: [facility] ENDOSCOPY;  Service: Pulmonary;  Laterality: N/A;  MUCUS PLUGGING    CARDIAC CATHETERIZATION  1996    CARDIAC SURGERY      CARDIAC SURGERY      fluid drained from heart    CATARACT EXTRACTION, BILATERAL  2003    COLONOSCOPY  2014    ENDOSCOPY  2016    2019    ENDOSCOPY N/A 06/25/2025    Procedure: ESOPHAGOGASTRODUODENOSCOPY;  Surgeon: Tanvi Del Valle MD;  Location: [facility] ENDOSCOPY;  Service: Gastroenterology;  Laterality: N/A;  gastritis, esophagitis    FEMORAL ARTERY STENT Bilateral     HYSTERECTOMY      LEG THROMBECTOMY/EMBOLECTOMY Left 01/29/2025    Procedure: LEFT LOWER EXTREMITY ANGIOGRAM;  Surgeon: Jaden Castañeda MD;  Location: [facility]  HYBRID OR;  Service: Vascular;  Laterality: Left;    LUNG BIOPSY Left 2005    lobectomy upper lung caner    LUNG VOLUME REDUCTION      OTHER SURGICAL HISTORY      artifical joints/limbs    REPLACEMENT TOTAL KNEE Left 2016    SHUNT O GRAM N/A 01/13/2025    Procedure: dialysis shuntogram;  Surgeon: Court Valente MD;  Location: [facility] INVASIVE LOCATION;  Service: Peripheral Vascular;  Laterality: N/A;    UPPER GASTROINTESTINAL ENDOSCOPY         SLP Recommendation and Plan      SPEECH PATHOLOGY DYSPHAGIA TREATMENT    Subjective/Behavioral Observations: Patient with decline in mental status since previous dysphagia treatment on 7/14/25. She underwent stroke RRT however ruled out.  Concern regarding patient's swallow safety due to decline in mental status.         Day/time of Treatment:7/17/25        Current Diet:mechanical soft, thin liquids        Treatment received: Re-assessment of swallow.         Results of treatment:Oral care given. Patient not opening eyes despite oral care and sternal rub. Patient however intermittently followed simple commands. No verbalizations only vocalizing. Laryngeal sounds clear to auscultation. Swallow stimulated x2 with stimulation. Single ice chip given however despite cues no oral manipulation observed. Due to mental status no further po attempted-high aspiration risk.         Progress toward goals:decline from original goals        Barriers to Achieving goals: medical status        Plan of care:/changes in plan: Discussed with nursing. Patient with decreased mental status since 7/14/15. She is not safe for continuation of oral diet. Recommend NPO. She may be appropriate for tube feed/cortrak however nursing reports placing palliative consult to discuss goals of care. Speech pathology will continue to follow.                                                                                              EDUCATION  The patient has been educated in the following areas:   Dysphagia  (Swallowing Impairment).                Time Calculation:    Time Calculation- SLP       Row Name 07/17/25 0934             Time Calculation- SLP    SLP Stop Time 0745  -SN      SLP Received On 07/17/25  -SN         Untimed Charges    10961-VO Treatment Swallow Minutes 45  -SN         Total Minutes    Untimed Charges Total Minutes 45  -SN       Total Minutes 45  -SN                User Key  (r) = Recorded By, (t) = Taken By, (c) = Cosigned By      Initials Name Provider Type    SN Denise Crowell MS-CCC/SLP, VISH Speech and Language Pathologist                    Therapy Charges for Today       Code Description Service Date Service Provider Modifiers Qty    [id number] HC ST TREATMENT SWALLOW 3 7/17/2025 Denise Crowell MS-CCC/SLP, VISH GN 1                 KAYCEE Watson/VISH BILL  7/17/2025

## 2025-07-17 NOTE — CONSULTS
[facility]   VASCULAR SURGERY CONSULT    Patient Name: Charlette Rai  : 1935  MRN: [id number]  Primary Care Physician:  Brennan Mosqueda MD  Date of admission: 2025    Subjective   Subjective     Chief Complaint: Cold right upper extremity    HPI:    Charlette Rai is a 89 y.o. female brought to the hospital following a fall.  I was asked to see because her right upper extremity was cold earlier today.  At the time a radial Doppler could be obtained on the left pulse was not palpable.  The patient denies any pain.  She denies any complaints at this time.  Nursing indicates that her right arm seems to be back to normal.    Review of Systems    Noncontributory except for the history of present illness.    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 DENIES CP BUT DOES HAVE SOA CHRONIC ISSUE COPD/LUNG CANCER    COPD (chronic obstructive pulmonary disease)     FOLLOWED BY DR MARIAJOSE BAILEY    Coronary artery disease     DENIES CP BUT DOES GET SOA MOST OF THE TIME WITH EXERTION BUT OCC AT REST CHRONIC ISSUE COPD/LUNG CANCER    Deep vein thrombosis     Diabetes mellitus     DOES NOT CHECK BS DAILY    Disease of thyroid gland     HYPOTHYROIDISM    Essential hypertension     Gastric ulcer     GERD (gastroesophageal reflux disease)     Heart murmur     History of transfusion     NO ISSUES POST TRANSFUSION WAS MANY YEARS  AGO    Hyperlipemia     Leukocytopenia     FOLLOWED BY DR MIR BAILEY    Limb swelling     Lumbago     Lumbar spinal stenosis     Lung cancer     Migraine headache     Multiple joint pain     On home oxygen therapy     4L/NC PRN    Osteopenia     PNA (pneumonia) 05/04/2024    Pseudomonas pneumonia 04/29/2024    Reflux esophagitis     Shortness of breath     Thyroid nodule     HAS ULTRASOUND YEARLY BEING MONITORED    Vascular disease     Vitamin D deficiency        Past Surgical History:   Procedure Laterality Date    ABDOMINAL SURGERY      ANGIOPLASTY RENAL ARTERY Left 06/14/2024    stent placement    APPENDECTOMY      ARTERIOGRAM N/A 06/14/2024    Procedure: Arteriogram, right radial access, aortogram and renal arteriogram with possible intervention.;  Surgeon: Dio Miguel MD;  Location: [facility] CATH INVASIVE LOCATION;  Service: Peripheral Vascular;  Laterality: N/A;    ARTERIOVENOUS FISTULA/SHUNT SURGERY Left 05/10/2022    Procedure: Left basilic vein transposition;  Surgeon: Wan Barksdale MD;  Location: [facility] MAIN OR;  Service: Vascular;  Laterality: Left;    ARTERIOVENOUS FISTULA/SHUNT SURGERY Left 03/23/2023    Procedure: Ligation of left arm arteriovenous fistula;  Surgeon: Wan Barksdale MD;  Location: [facility] MAIN OR;  Service: Vascular;  Laterality: Left;    ARTERIOVENOUS FISTULA/SHUNT SURGERY Left 11/30/2023    Procedure: Creation of left arm arteriovenous graft;  Surgeon: Wan Barksdale MD;  Location: [facility] MAIN OR;  Service: Vascular;  Laterality: Left;    BRONCHOSCOPY N/A 04/24/2024    Procedure: BRONCHOSCOPY WITH BAL AND WASHINGS;  Surgeon: Khalif Yanez MD;  Location: [facility] ENDOSCOPY;  Service: Pulmonary;  Laterality: N/A;  MUCUS PLUGGING    CARDIAC CATHETERIZATION  1996    CARDIAC SURGERY      CARDIAC SURGERY      fluid drained from heart    CATARACT EXTRACTION, BILATERAL  2003    COLONOSCOPY  2014    ENDOSCOPY  2016    2019    ENDOSCOPY N/A 06/25/2025    Procedure: ESOPHAGOGASTRODUODENOSCOPY;   Surgeon: Tanvi Del Valle MD;  Location: [facility] ENDOSCOPY;  Service: Gastroenterology;  Laterality: N/A;  gastritis, esophagitis    FEMORAL ARTERY STENT Bilateral     HYSTERECTOMY      LEG THROMBECTOMY/EMBOLECTOMY Left 01/29/2025    Procedure: LEFT LOWER EXTREMITY ANGIOGRAM;  Surgeon: Jaden Castañeda MD;  Location: [facility] HYBRID OR;  Service: Vascular;  Laterality: Left;    LUNG BIOPSY Left 2005    lobectomy upper lung caner    LUNG VOLUME REDUCTION      OTHER SURGICAL HISTORY      artifical joints/limbs    REPLACEMENT TOTAL KNEE Left 2016    SHUNT O GRAM N/A 01/13/2025    Procedure: dialysis shuntogram;  Surgeon: Court Valente MD;  Location: [facility] CATH INVASIVE LOCATION;  Service: Peripheral Vascular;  Laterality: N/A;    UPPER GASTROINTESTINAL ENDOSCOPY         Family History: family history includes Arthritis in her father and mother; Cancer in her brother, brother, brother, and brother; Diabetes in her brother; Other in her brother; Prostate cancer in her brother and brother. Otherwise pertinent FHx was reviewed and not pertinent to current issue.    Social History:  reports that she quit smoking about 21 years ago. Her smoking use included cigarettes. She started smoking about 73 years ago. She has a 52.5 pack-year smoking history. She has been exposed to tobacco smoke. She has never used smokeless tobacco. She reports that she does not currently use alcohol. She reports that she does not use drugs.    Home Medications:  Methoxy PEG-Epoetin Beta, acetaminophen, albuterol sulfate HFA, aspirin, budesonide, carvedilol, clopidogrel, dexlansoprazole, ferrous gluconate, fluticasone, furosemide, gabapentin, ipratropium-albuterol, isosorbide mononitrate, levocetirizine, levothyroxine, lidocaine-prilocaine, losartan, rosuvastatin, tobramycin PF, and vitamin D      Allergies:  No Known Allergies    Objective   Objective     Vitals:   Temp:  [97.4 °F (36.3 °C)-97.8 °F (36.6 °C)] 97.4 °F (36.3 °C)  Heart Rate:   [74-92] 85  Resp:  [16-20] 20  BP: (135-153)/(31-53) 148/53  Flow (L/min) (Oxygen Therapy):  [2] 2    Physical Exam    General: Awake, alert, NAD   Eyes:  SANTIAGO   Neck: Supple   Lungs: Clear   Heart: RRR   Abdomen: benign   Musculoskeletal:  normal motor tone, symmetric   Skin: warm, normal turgor   Neuro: strength 5/5 all extremities   Pulses: Right radial difficult to feel.  The right hand and arm have grossly normal color, temperature and motor function.     Diagnostic studies: Duplex of the right upper extremity from today demonstrates normal waveforms and velocities.    Assessment & Plan   Assessment / Plan     Active Hospital Problems:  There are no active hospital problems to display for this patient.      Assessment/plan:   No evidence of clinically significant arterial insufficiency of the right upper extremity at this time.  No further evaluation indicated from the vascular surgery standpoint.  Recall as needed.    Thank you.      Electronically signed by Wan Barksdale MD, 07/17/25, 4:16 PM EDT.

## 2025-07-17 NOTE — CONSULTS
Palliative care consult for goals of care. Prior visits the patients changed her code status to DNR/DNI.   Patient is currently off the unit. I will follow up when she return.  Padmaja LLANES RN, BSN  [facility]

## 2025-07-17 NOTE — PLAN OF CARE
Goal Outcome Evaluation:  Plan of Care Reviewed With: patient        Progress: no change  Outcome Evaluation: C/O pain, PRN pain medication given as ordered, every two hour turns, skin care, and incontinence care provided. Bloody stool noted, notified PA, orders placed for occult blood in stool, stool sent to lab. Fall precautions in place, call button within reach, frequent rounding, and bed alarm activated. Wound care to heel provided, tolerated fairly well, heels floated and pillows placed to prevent pressure injuries.

## 2025-07-18 ENCOUNTER — APPOINTMENT (OUTPATIENT)
Dept: NEPHROLOGY | Facility: HOSPITAL | Age: 89
End: 2025-07-18
Payer: MEDICARE

## 2025-07-18 VITALS
TEMPERATURE: 97.4 F | HEIGHT: 62 IN | RESPIRATION RATE: 18 BRPM | SYSTOLIC BLOOD PRESSURE: 128 MMHG | DIASTOLIC BLOOD PRESSURE: 40 MMHG | WEIGHT: 162.92 LBS | BODY MASS INDEX: 29.98 KG/M2 | OXYGEN SATURATION: 98 % | HEART RATE: 88 BPM

## 2025-07-18 LAB
ALBUMIN SERPL-MCNC: 2.1 G/DL (ref 3.5–5.2)
ALBUMIN/GLOB SERPL: 0.7 G/DL
ALP SERPL-CCNC: 257 U/L (ref 39–117)
ALT SERPL W P-5'-P-CCNC: 43 U/L (ref 1–33)
ANION GAP SERPL CALCULATED.3IONS-SCNC: 10.6 MMOL/L (ref 5–15)
ANISOCYTOSIS BLD QL: NORMAL
AST SERPL-CCNC: 57 U/L (ref 1–32)
BASOPHILS # BLD AUTO: 0.07 10*3/MM3 (ref 0–0.2)
BASOPHILS NFR BLD AUTO: 0.8 % (ref 0–1.5)
BILIRUB SERPL-MCNC: 0.3 MG/DL (ref 0–1.2)
BUN SERPL-MCNC: 34.1 MG/DL (ref 8–23)
BUN/CREAT SERPL: 14.5 (ref 7–25)
BURR CELLS BLD QL SMEAR: NORMAL
CALCIUM SPEC-SCNC: 7.8 MG/DL (ref 8.6–10.5)
CHLORIDE SERPL-SCNC: 102 MMOL/L (ref 98–107)
CO2 SERPL-SCNC: 18.4 MMOL/L (ref 22–29)
CREAT SERPL-MCNC: 2.35 MG/DL (ref 0.57–1)
DEPRECATED RDW RBC AUTO: 81.1 FL (ref 37–54)
EGFRCR SERPLBLD CKD-EPI 2021: 19.5 ML/MIN/1.73
EOSINOPHIL # BLD AUTO: 0.1 10*3/MM3 (ref 0–0.4)
EOSINOPHIL NFR BLD AUTO: 1.2 % (ref 0.3–6.2)
ERYTHROCYTE [DISTWIDTH] IN BLOOD BY AUTOMATED COUNT: 19.6 % (ref 12.3–15.4)
GLOBULIN UR ELPH-MCNC: 3 GM/DL
GLUCOSE BLDC GLUCOMTR-MCNC: 72 MG/DL (ref 70–99)
GLUCOSE BLDC GLUCOMTR-MCNC: 78 MG/DL (ref 70–99)
GLUCOSE BLDC GLUCOMTR-MCNC: 85 MG/DL (ref 70–99)
GLUCOSE BLDC GLUCOMTR-MCNC: 88 MG/DL (ref 70–99)
GLUCOSE BLDC GLUCOMTR-MCNC: 88 MG/DL (ref 70–99)
GLUCOSE SERPL-MCNC: 61 MG/DL (ref 65–99)
HCT VFR BLD AUTO: 26 % (ref 34–46.6)
HGB BLD-MCNC: 7.3 G/DL (ref 12–15.9)
IMM GRANULOCYTES # BLD AUTO: 0.06 10*3/MM3 (ref 0–0.05)
IMM GRANULOCYTES NFR BLD AUTO: 0.7 % (ref 0–0.5)
LYMPHOCYTES # BLD AUTO: 0.45 10*3/MM3 (ref 0.7–3.1)
LYMPHOCYTES NFR BLD AUTO: 5.4 % (ref 19.6–45.3)
MACROCYTES BLD QL SMEAR: NORMAL
MCH RBC QN AUTO: 31.9 PG (ref 26.6–33)
MCHC RBC AUTO-ENTMCNC: 28.1 G/DL (ref 31.5–35.7)
MCV RBC AUTO: 113.5 FL (ref 79–97)
MONOCYTES # BLD AUTO: 0.71 10*3/MM3 (ref 0.1–0.9)
MONOCYTES NFR BLD AUTO: 8.6 % (ref 5–12)
NEUTROPHILS NFR BLD AUTO: 6.91 10*3/MM3 (ref 1.7–7)
NEUTROPHILS NFR BLD AUTO: 83.3 % (ref 42.7–76)
NRBC BLD AUTO-RTO: 0 /100 WBC (ref 0–0.2)
OVALOCYTES BLD QL SMEAR: NORMAL
PLAT MORPH BLD: NORMAL
PLATELET # BLD AUTO: 134 10*3/MM3 (ref 140–450)
PMV BLD AUTO: 10 FL (ref 6–12)
POLYCHROMASIA BLD QL SMEAR: NORMAL
POTASSIUM SERPL-SCNC: 4.5 MMOL/L (ref 3.5–5.2)
PROT SERPL-MCNC: 5.1 G/DL (ref 6–8.5)
RBC # BLD AUTO: 2.29 10*6/MM3 (ref 3.77–5.28)
SODIUM SERPL-SCNC: 131 MMOL/L (ref 136–145)
WBC MORPH BLD: NORMAL
WBC NRBC COR # BLD AUTO: 8.3 10*3/MM3 (ref 3.4–10.8)

## 2025-07-18 PROCEDURE — 97530 THERAPEUTIC ACTIVITIES: CPT

## 2025-07-18 PROCEDURE — 85025 COMPLETE CBC W/AUTO DIFF WBC: CPT | Performed by: INTERNAL MEDICINE

## 2025-07-18 PROCEDURE — 85007 BL SMEAR W/DIFF WBC COUNT: CPT | Performed by: INTERNAL MEDICINE

## 2025-07-18 PROCEDURE — 80053 COMPREHEN METABOLIC PANEL: CPT | Performed by: INTERNAL MEDICINE

## 2025-07-18 PROCEDURE — 25010000002 ONDANSETRON PER 1 MG: Performed by: INTERNAL MEDICINE

## 2025-07-18 PROCEDURE — 99239 HOSP IP/OBS DSCHRG MGMT >30: CPT | Performed by: INTERNAL MEDICINE

## 2025-07-18 PROCEDURE — 94664 DEMO&/EVAL PT USE INHALER: CPT

## 2025-07-18 PROCEDURE — 97535 SELF CARE MNGMENT TRAINING: CPT

## 2025-07-18 PROCEDURE — 94799 UNLISTED PULMONARY SVC/PX: CPT

## 2025-07-18 PROCEDURE — 92526 ORAL FUNCTION THERAPY: CPT

## 2025-07-18 PROCEDURE — 82948 REAGENT STRIP/BLOOD GLUCOSE: CPT

## 2025-07-18 PROCEDURE — 25010000002 HEPARIN (PORCINE) PER 1000 UNITS: Performed by: INTERNAL MEDICINE

## 2025-07-18 RX ADMIN — ASPIRIN 81 MG: 81 TABLET, COATED ORAL at 08:06

## 2025-07-18 RX ADMIN — CLOPIDOGREL BISULFATE 75 MG: 75 TABLET, FILM COATED ORAL at 08:06

## 2025-07-18 RX ADMIN — HEPARIN SODIUM 3200 UNITS: 1000 INJECTION INTRAVENOUS; SUBCUTANEOUS at 12:11

## 2025-07-18 RX ADMIN — IPRATROPIUM BROMIDE AND ALBUTEROL SULFATE 3 ML: .5; 3 SOLUTION RESPIRATORY (INHALATION) at 13:25

## 2025-07-18 RX ADMIN — ONDANSETRON 4 MG: 2 INJECTION INTRAMUSCULAR; INTRAVENOUS at 10:32

## 2025-07-18 RX ADMIN — FLUTICASONE PROPIONATE 1 SPRAY: 50 SPRAY, METERED NASAL at 08:07

## 2025-07-18 RX ADMIN — PANTOPRAZOLE SODIUM 40 MG: 40 TABLET, DELAYED RELEASE ORAL at 08:06

## 2025-07-18 RX ADMIN — IPRATROPIUM BROMIDE AND ALBUTEROL SULFATE 3 ML: .5; 3 SOLUTION RESPIRATORY (INHALATION) at 05:39

## 2025-07-18 NOTE — DISCHARGE SUMMARY
[facility]        HOSPITALIST  DISCHARGE SUMMARY    Patient Name: Charlette Rai  : 1935  MRN: [id number]    Date of Admission: 2025  Date of Discharge:  2025  Primary Care Physician: Brennan Mosqueda MD    Consults       Date and Time Order Name Status Description    2025  9:08 AM Inpatient Vascular Surgery Consult Completed     2025 11:56 AM Inpatient Neurology Consult Stroke Completed     2025  6:11 PM Nephrology  (on-call MD unless specified) Completed     2025  6:01 PM Hospitalist (on-call MD unless specified)      2025  8:09 AM Inpatient Nephrology Consult Completed     2025  8:04 AM Inpatient Pulmonology Consult Completed     2025  8:37 PM Gastroenterology (on-call MD unless specified) Completed             Active and Resolved Hospital Problems:  Active Hospital Problems   No active problems to display.      Resolved Hospital Problems    Diagnosis POA    **UTI (urinary tract infection) [N39.0] Yes    AMS (altered mental status) [R41.82] No   Acute toxic encephalopathy.  From narcotics and gabapentin.  DC'd  UTI with altered mental status.  Finished antibiotics  Peripheral vascular disease and renal artery stenosis status post PTA and stent on 2024 on aspirin and Plavix  Hypotension  Anemia.  Stable  Diarrhea.  Improving  End-stage renal disease on hemodialysis via right IJ tunnel dialysis catheter on MWF by Dr. García  Severe protein calorie malnutrition  Chronic diastolic heart failure not in acute exacerbation  Hypothyroidism  Chronic hypoxemia on home oxygen  Severe protein calorie malnutrition  Recent left femoral fracture with repair at [facility].  Aortic stenosis  Mild transaminitis.  Stable  Hospital Course     Hospital Course:  Charlette Rai is a 89 y.o. female with a past medical history significant for peripheral arterial disease, renal artery stenosis status post stenting, hypertension, ESRD on HD, diastolic CHF, COPD,  lung cancer on chemotherapy, type 2 diabetes mellitus, reportedly patient has been having significant diarrhea for several days, has not eaten for couple days, had been getting IV fluids with dialysis because of hypotension, patient was recently in the hospital from 6/24 to 7/3/2025 at that time she came in with complaint of hematemesis and worsening weakness.  Patient had EGD which showed esophagitis and gastritis.  Patient did receive a unit of blood at that time.  Patient was restarted on aspirin at discharge.  Of note on 6/11/2025 patient had a fall and acute periprosthetic femoral fracture and was sent to [facility] for repair.  In the emergency department the patient was found to be hypotensive, was 78% on room air, chest x-ray showed volume loss and haziness throughout the left hemithorax.  Of note patient does have a history of lung cancer and left upper lobe ectomy patient underwent CT scan of the head which was negative for any acute abnormalities however did have some chronic small vessel ischemic changes.  Patient was found to have UTI via UA.  Patient was admitted to the hospital for further workup and management.  Patient initially improved in her mental status, she was alert and oriented, complaining of pain, she did tolerate pain medications well. On the morning of 7/14/2025 patient more altered, drowsy, states she is just not feeling right.  There was some concern for some possible left upper extremity weakness although this was not noted by teleneurology as they felt the exam was symmetric.  Stroke RRT was called and CTA showed right ICA 60% stenosis due to plaque.  Per patient this is chronic.  MRI of the brain came back negative.  Neuro signed off episode appears to be metabolic likely medications..  On July 17 patient obtunded in the morning notified by nurse.  On evaluation patient noticed responding to vocal commands.  Minimal response to painful stimulus.  Patient given Narcan x 2  with improvement.  Patient had 2.5 mg oxycodone yesterday late afternoon.  Earlier in the morning patient was awake and alert asking for pills.  Stat CT head negative.  ABG not significant  Nursing staff also noted right upper extremity cold with nonpalpable radial pulse.  Vascular consulted.  Stat arterial ultrasound right upper extremity with good velocities and waveform.  Subsequently her right upper extremity became warm and back to normal.  No intervention as per vascular.  By afternoon patient was gradually returning to her baseline.    Interval Followup:   Vital stable.  Remains on 2 L.  Patient is awake alert oriented.  Continues to have diarrhea.  Stool culture came back negative.  Blood sugar on the lower side.  As patient did not eat well yesterday because of change in mental status.  Patient responded well to Narcan.  Oxycodone DC'd as patient becomes very obtunded even with small dose  Patient having her lunch during my rounds tolerating it well    DISCHARGE Follow Up Recommendations for labs and diagnostics: PCP and nephrology.  Use Imodium as needed.  Neurontin DC'd for good.  Avoid all narcotics as it causes profound lethargy.  Rehab facility check H&H in a week time.  Hemodialysis as per nephrology      Day of Discharge     Vital Signs:  Temp:  [97.3 °F (36.3 °C)-98.2 °F (36.8 °C)] 97.4 °F (36.3 °C)  Heart Rate:  [77-89] 88  Resp:  [14-20] 18  BP: ()/(38-56) 128/40  Flow (L/min) (Oxygen Therapy):  [2] 2    Physical Exam:     GEN: No acute distress, resting comfortably in bed  HEENT: Moist mucous membranes  LUNGS: Equal chest rise bilaterally  CARDIAC: 2/6 systolic murmur regular rate and rhythm  NEURO: Moving all 4 extremities spontaneously  SKIN: Multiple skin excoriations     Discharge Details        Discharge Medications        Changes to Medications        Instructions Start Date   vitamin D 1.25 MG (18468 UT) capsule capsule  Commonly known as: ERGOCALCIFEROL  What changed: additional  instructions   50,000 Units, Oral, Every 14 Days             Continue These Medications        Instructions Start Date   acetaminophen 325 MG tablet  Commonly known as: TYLENOL   650 mg, Every 6 Hours PRN      albuterol sulfate  (90 Base) MCG/ACT inhaler  Commonly known as: PROVENTIL HFA;VENTOLIN HFA;PROAIR HFA   2 puffs, Inhalation, Every 4 Hours PRN      aspirin 81 MG EC tablet   81 mg, Daily      budesonide 0.5 MG/2ML nebulizer solution  Commonly known as: Pulmicort   0.5 mg, Nebulization, 2 Times Daily - RT      carvedilol 6.25 MG tablet  Commonly known as: COREG   6.25 mg, Oral, 2 Times Daily With Meals      clopidogrel 75 MG tablet  Commonly known as: Plavix   75 mg, Oral, Daily      dexlansoprazole 60 MG capsule  Commonly known as: DEXILANT   60 mg, Oral, Daily      ferrous gluconate 324 MG tablet  Commonly known as: FERGON   324 mg, Oral, 3 Times Weekly      fluticasone 50 MCG/ACT nasal spray  Commonly known as: FLONASE   Administer 1 spray into the nostril(s) as directed by provider Daily.      furosemide 20 MG tablet  Commonly known as: LASIX   20 mg, Oral, Daily      ipratropium-albuterol 0.5-2.5 mg/3 ml nebulizer  Commonly known as: DUO-NEB   3 mL, Nebulization, Every 6 Hours PRN      isosorbide mononitrate 30 MG 24 hr tablet  Commonly known as: IMDUR   30 mg, Oral, Nightly      levothyroxine 50 MCG tablet  Commonly known as: SYNTHROID, LEVOTHROID   1 tablet, Daily      lidocaine-prilocaine 2.5-2.5 % cream  Commonly known as: EMLA   Apply 1 Application topically to the appropriate area as directed Take As Directed. Apply to dialysis access site 30-45 minutes prior to dialysis      losartan 25 MG tablet  Commonly known as: COZAAR   25 mg, Oral, Every 24 Hours Scheduled      MIRCERA IJ   200 mcg, Every 14 Days      rosuvastatin 20 MG tablet  Commonly known as: CRESTOR   10 mg, Oral, Nightly      tobramycin  MG/5ML nebulizer solution  Commonly known as: JUAN   300 mg, Nebulization, 2 Times  Daily - RT, 30 days on 30 days off             Stop These Medications      gabapentin 100 MG capsule  Commonly known as: Neurontin     levocetirizine 5 MG tablet  Commonly known as: XYZAL              No Known Allergies    Discharge Disposition:  Skilled Nursing Facility (DC - External) signature of [facility]    Diet:  Diet Instructions       Diet: Regular/House Diet; Mechanical Ground (NDD 2); Thin (IDDSI 0)      Discharge Diet: Regular/House Diet    Texture: Mechanical Ground (NDD 2)    Fluid Consistency: Thin (IDDSI 0)            Discharge Activity:   Activity Instructions       Activity as Tolerated              CODE STATUS:  Code Status and Medical Interventions: No CPR (Do Not Attempt to Resuscitate); Limited Support; No cardioversion, No intubation (DNI)   Ordered at: 07/17/25 1713     Code Status (Patient has no pulse and is not breathing):    No CPR (Do Not Attempt to Resuscitate)     Medical Interventions (Patient has pulse or is breathing):    Limited Support     Medical Intervention Limits:    No cardioversion       No intubation (DNI)         Future Appointments   Date Time Provider Department [place]   10/2/2025 11:30 AM León [place] [facility] [place] [facility]   10/21/2025 11:15 AM Khalif Yanez [place] [facility] [place] [facility]   11/13/2025  1:30 PM [address]   11/18/2025  1:30 PM Nevin Starks APRN [facility]       Additional Instructions for the Follow-ups that You Need to Schedule       Discharge Follow-up with PCP   As directed       Currently Documented PCP:    Brennan Mosqueda MD    PCP Phone Number:    [phone]     Follow Up Details: 1 week        Discharge Follow-up with Specialty: Orthopedic surgery   As directed      Specialty: Orthopedic surgery        Discharge Follow-up with Specified Provider: Dr. García   As directed      To: Dr. García   Follow Up Details: ESRD on hemodialysis                Pertinent  and/or Most Recent Results     PROCEDURES:   * Cannot find OR  case *     LAB RESULTS:      Lab 07/18/25  0438 07/17/25  0436 07/16/25  0426 07/15/25  0527 07/14/25 0439 07/13/25 0440 07/12/25  0435   WBC 8.30  --   --  8.80 8.34 9.91 10.60   HEMOGLOBIN 7.3* 7.5* 7.6* 7.7* 7.4* 7.7* 7.5*   HEMATOCRIT 26.0* 25.9* 25.8* 27.1* 25.5* 27.3* 25.7*   PLATELETS 134*  --   --  98* 96* 91* 102*   NEUTROS ABS 6.91  --   --  7.09* 6.58 7.86* 8.62*   IMMATURE GRANS (ABS) 0.06*  --   --  0.08* 0.07* 0.07* 0.09*   LYMPHS ABS 0.45*  --   --  0.50* 0.60* 0.51* 0.34*   MONOS ABS 0.71  --   --  0.84 0.89 1.17* 1.35*   EOS ABS 0.10  --   --  0.20 0.16 0.23 0.13   .5*  --   --  111.5* 109.0* 114.7* 107.1*         Lab 07/18/25  0438 07/16/25  0426 07/15/25  0527 07/14/25  0439 07/13/25  0440 07/12/25  0435   SODIUM 131* 128* 131* 136 133* 135*   POTASSIUM 4.5 4.1 4.2 3.8 4.1 3.8   CHLORIDE 102 97* 100 104 103 102   CO2 18.4* 20.9* 23.7 20.6* 19.5* 23.6   ANION GAP 10.6 10.1 7.3 11.4 10.5 9.4   BUN 34.1* 43.2* 34.2* 44.5* 34.6* 26.6*   CREATININE 2.35* 2.97* 2.57* 3.21* 2.79* 2.29*   EGFR 19.5* 14.7* 17.5* 13.4* 15.9* 20.1*   GLUCOSE 61* 99 81 85 99 103*   CALCIUM 7.8* 8.0* 8.0* 7.5* 7.4* 7.6*   MAGNESIUM  --   --  1.9 2.0 1.9 1.8   PHOSPHORUS  --   --  3.3 3.4 3.3 2.9   HEMOGLOBIN A1C  --   --  4.40*  --   --   --          Lab 07/18/25  0438 07/15/25  0527 07/14/25  0439 07/13/25  0440 07/12/25  0435   TOTAL PROTEIN 5.1* 5.3* 4.9* 4.8* 4.7*   ALBUMIN 2.1* 2.3* 2.1* 1.9* 2.2*   GLOBULIN 3.0 3.0 2.8 2.9 2.5   ALT (SGPT) 43* 42* 38* 41* 46*   AST (SGOT) 57* 68* 60* 82* 120*   BILIRUBIN 0.3 0.4 0.3 0.3 0.3   ALK PHOS 257* 243* 231* 216* 233*             Lab 07/15/25  0527   CHOLESTEROL 71   LDL CHOL 13   HDL CHOL 44   TRIGLYCERIDES 56             Lab 07/17/25  0917   PH, ARTERIAL 7.338*   PCO2, ARTERIAL 47.7*   PO2 ART 88.4   O2 SATURATION ART 96.1   HCO3 ART 25.6   BASE EXCESS ART -0.3     Brief Urine Lab Results  (Last result in the past 365 days)        Color   Clarity   Blood   Leuk Est    Nitrite   Protein   CREAT   Urine HCG        07/09/25 1508 Yellow   Turbid   Small (1+)   Large (3+)   Negative   100 mg/dL (2+)                 Microbiology Results (last 10 days)       Procedure Component Value - Date/Time    Enteric Parasite Panel - Stool, Per Rectum [[id number]]  (Normal) Collected: 07/17/25 0346    Lab Status: Final result Specimen: Stool from Per Rectum Updated: 07/17/25 1603     Giardia lamblia Not Detected     Cryptosporidium Not Detected     Entamoeba histolytica Not Detected    Enteric Bacterial Panel - Stool, Per Rectum [[id number]]  (Normal) Collected: 07/17/25 0346    Lab Status: Final result Specimen: Stool from Per Rectum Updated: 07/17/25 1215     Salmonella Not Detected     Campylobacter Not Detected     Shigella/Enteroinvasive E. coli (EIEC) Not Detected     Shiga-like toxin-producing E. coli (STEC) stx1/stx2 Not Detected    Blood Culture - Blood, Blood, Central Line [[id number]]  (Normal) Collected: 07/10/25 1144    Lab Status: Final result Specimen: Blood, Central Line Updated: 07/15/25 1215     Blood Culture No growth at 5 days    Blood Culture - Blood, Blood, Central Line [[id number]]  (Normal) Collected: 07/10/25 1144    Lab Status: Final result Specimen: Blood, Central Line Updated: 07/15/25 1215     Blood Culture No growth at 5 days    MRSA Screen, PCR (Inpatient) - Swab, Nares [[id number]]  (Normal) Collected: 07/09/25 2245    Lab Status: Final result Specimen: Swab from Nares Updated: 07/10/25 0114     MRSA PCR No MRSA Detected    Narrative:      The negative predictive value of this diagnostic test is high and should only be used to consider de-escalating anti-MRSA therapy. A positive result may indicate colonization with MRSA and must be correlated clinically.    Clostridioides difficile Toxin - Stool, Per Rectum [[id number]] Collected: 07/09/25 1722    Lab Status: Final result Specimen: Stool from Per Rectum Updated: 07/09/25 1812    Narrative:      The following orders were  created for panel order Clostridioides difficile Toxin - Stool, Per Rectum.  Procedure                               Abnormality         Status                     ---------                               -----------         ------                     Clostridioides difficile...[[id number]]                      Final result                 Please view results for these tests on the individual orders.    Clostridioides difficile Toxin, PCR - Stool, Per Rectum [[id number]] Collected: 07/09/25 1722    Lab Status: Final result Specimen: Stool from Per Rectum Updated: 07/09/25 1812     Toxigenic C. difficile by PCR Negative     027 Toxin Presumptive Negative    Narrative:      The result indicates the absence of toxigenic C. difficile from stool specimen.     Blood Culture - Blood, Arm, Right [[id number]]  (Normal) Collected: 07/09/25 1623    Lab Status: Final result Specimen: Blood from Arm, Right Updated: 07/14/25 1645     Blood Culture No growth at 5 days    Narrative:      Less than seven (7) mL's of blood was collected.  Insufficient quantity may yield false negative results.    Blood Culture - Blood, Arm, Right [[id number]]  (Normal) Collected: 07/09/25 1529    Lab Status: Final result Specimen: Blood from Arm, Right Updated: 07/14/25 1545     Blood Culture No growth at 5 days            CT Head Without Contrast  Result Date: 7/17/2025  Impression: Impression: 1.No acute intracranial process identified. 2.Findings suggestive of moderate chronic small vessel ischemic disease. 3.Bilateral mastoid effusions worse on the right. Electronically Signed: Leonardo Herbert MD  7/17/2025 10:48 AM EDT  Workstation ID: [id number]    MRI Brain Without Contrast  Result Date: 7/14/2025  Impression: Impression: No acute intracranial finding.. Nonspecific large bilateral mastoid effusions and right middle ear effusion. Electronically Signed: Imer Friedman MD  7/14/2025 9:06 PM EDT  Workstation ID: [id number]    CT Angiogram  Carotids  Result Date: 7/14/2025  Impression: Impression: 1.Partially calcified plaque along right carotid bifurcation resulting in focal 60% stenosis along proximal right ICA. 2.Partially calcified plaque along left carotid bifurcation that is not hemodynamically significant. 3.Moderate to severe stenosis along proximal left subclavian artery although it remains patent throughout. 4.Left vertebral artery is patent with mild stenosis in its V3 segment and focal moderate stenosis of V4 segment. 5.Major arterial vasculature intracranially at [place] and distally is patent, with no thrombus or aneurysm. Electronically Signed: Leonardo Herbert MD  7/14/2025 11:50 AM EDT  Workstation ID: [id number]    CT Angiogram Head  Result Date: 7/14/2025  Impression: Impression: 1.Partially calcified plaque along right carotid bifurcation resulting in focal 60% stenosis along proximal right ICA. 2.Partially calcified plaque along left carotid bifurcation that is not hemodynamically significant. 3.Moderate to severe stenosis along proximal left subclavian artery although it remains patent throughout. 4.Left vertebral artery is patent with mild stenosis in its V3 segment and focal moderate stenosis of V4 segment. 5.Major arterial vasculature intracranially at [place] and distally is patent, with no thrombus or aneurysm. Electronically Signed: Leonardo Herbert MD  7/14/2025 11:50 AM EDT  Workstation ID: [id number]    CT Head Without Contrast Stroke Protocol  Result Date: 7/14/2025  Impression: Impression: 1. No intracranial hemorrhage. 2. No CT changes of acute major vascular territory brain ischemia at this time Electronically Signed: Karlo Andrea  7/14/2025 10:56 AM EDT  Workstation ID: [id number]    CT Head Without Contrast  Result Date: 7/9/2025  Impression: Impression: 1.There are periventricular and deep white matter changes which could reflect more chronic small vessel ischemic change. 2.Atherosclerotic vascular  calcification. 3.Bilateral mastoid disease. There is right middle ear disease surrounding the ossicles. 4.Incidental osteoma right frontal sinus. Electronically Signed: Tony Kurtz MD  7/9/2025 5:54 PM EDT  Workstation ID: [id number]    XR Chest 1 View  Result Date: 7/9/2025  Impression: Impression: 1.Volume loss left lung with haziness throughout the left hemithorax that has been noted. Patient has had a left upper lobe lobectomy. 2.Pleural-based calcification left hemithorax that may relate to prior inflammatory infectious process or asbestos exposure. 3.Density right upper lobe that may be reflective of posttreatment change. Electronically Signed: Tony Kurtz MD  7/9/2025 3:02 PM EDT  Workstation ID: [id number]    XR Chest 1 View  Result Date: 6/24/2025  Impression: 1.Slight improvement in aeration of the left lung relative to the prior study. 2.Otherwise no significant interval change. Electronically Signed: Weston Castillo MD  6/24/2025 9:31 PM EDT  Workstation ID: [id number]    CT Abdomen Pelvis Without Contrast  Result Date: 6/24/2025  Impression: 1.Large colonic stool burden compatible with the history of constipation. There is some stranding adjacent to the rectosigmoid colon, which could reflect mild stercoral colitis. No bowel obstruction. 2.Distended urinary bladder. Correlate for bladder outlet obstruction. 3.Hydropic gallbladder containing multiple small stones. No definite gallbladder wall thickening or adjacent inflammatory change. 4.Interval development of anasarca. There is some asymmetric skin thickening and subcutaneous edema in the proximal left thigh. Correlate for cellulitis. 5.Trace right pleural effusion, improved from prior. 6.Additional findings as described above. Electronically Signed: Weston Castillo MD  6/24/2025 9:29 PM EDT  Workstation ID: [id number]    XR Abdomen Flat & Upright  Result Date: 6/24/2025  Impression: Impression: 1. Large volume colonic stool consistent with constipation. No  evidence of bowel obstruction. 2. Postsurgical changes of the left hemithorax. There is a questionable left pneumothorax. Recommend dedicated chest radiograph. Electronically Signed: Ildefonso Purcell MD  6/24/2025 7:21 PM EDT  Workstation ID: [id number]      Results for orders placed during the hospital encounter of 07/09/25    Duplex Upper Extremity Art / Grafts - Right CAR 07/17/2025 12:25 PM    Interpretation Summary    Normal right upper extremity arterial duplex exam.      Results for orders placed during the hospital encounter of 07/09/25    Duplex Upper Extremity Art / Grafts - Right CAR 07/17/2025 12:25 PM    Interpretation Summary    Normal right upper extremity arterial duplex exam.      Results for orders placed during the hospital encounter of 04/22/25    Adult Transthoracic Echo Complete W/ Cont if Necessary Per Protocol 04/23/2025  2:37 PM    Interpretation Summary    Left ventricular systolic function is normal. Calculated left ventricular EF = 60.5%    Left ventricular diastolic function is consistent with (grade Ia w/high LAP) impaired relaxation.    The left atrial cavity is mildly dilated.    Mild to moderate aortic valve stenosis is present.    Aortic valve mean pressure gradient is 16 mmHg.    Mild to moderate mitral valve stenosis is present.    Estimated right ventricular systolic pressure from tricuspid regurgitation is mildly elevated (35-45 mmHg).      Imaging Results (All)       Procedure Component Value Units Date/Time    CT Head Without Contrast [[id number]] Collected: 07/17/25 1036     Updated: 07/17/25 1051    Narrative:      CT HEAD WO CONTRAST    Date of Exam: 7/17/2025 10:17 AM EDT    Indication: Lethargic.    Comparison: MRI of the brain and CT head from July 14, 2025    Technique: Axial CT images were obtained of the head without contrast administration.  Reconstructed coronal and sagittal images were also obtained. Automated exposure control and iterative construction methods were  used.      Findings:  No acute intracranial hemorrhage or extra-axial collection is identified. The ventricles appear normal in caliber, with no evidence of mass effect or midline shift. The basal cisterns appear patent. The gray-white differentiation appears preserved.    The calvarium appear intact. The paranasal sinuses are clear. There are bilateral mastoid effusions worse on the right. Patchy areas of periventricular and subcortical white matter hypodensities are nonspecific, but likely the sequela of moderate chronic   small vessel ischemic disease.      Impression:      Impression:  1.No acute intracranial process identified.  2.Findings suggestive of moderate chronic small vessel ischemic disease.  3.Bilateral mastoid effusions worse on the right.        Electronically Signed: Leonardo Herbert MD    7/17/2025 10:48 AM EDT    Workstation ID: [id number]    MRI Brain Without Contrast [[id number]] Collected: 07/14/25 2100     Updated: 07/14/25 2108    Narrative:      MRI BRAIN WO CONTRAST    Date of Exam: 7/14/2025 8:39 PM EDT    Indication: Stroke r/o.     Comparison: Head CT and CTA head and neck 7/14/2025.    Technique:  Routine multiplanar/multisequence sequence images of the brain were obtained without contrast administration.      Findings:  No acute midline shift, extra axial fluid collection, hydrocephalus, or infarct.    No subacute to old hemorrhage.    Mild generalized parenchymal volume loss.    Moderate T2/FLAIR hyperintensity in the subcortical and periventricular white matter as well as the [place], nonspecific, perhaps from small vessel ischemic/hypertensive changes in a patient of this age.    Appropriate flow voids at the skull base and in the major venous sinuses.    Mild mucosal changes in the paranasal sinuses. . Nonspecific large bilateral mastoid effusions and right middle ear effusion. Bilateral arthrosis.    No acute or aggressive appearing bone or extracranial soft tissue process.         Impression:      Impression:  No acute intracranial finding..     Nonspecific large bilateral mastoid effusions and right middle ear effusion.        Electronically Signed: Imer Friedman MD    7/14/2025 9:06 PM EDT    Workstation ID: [id number]    CT Angiogram Carotids [[id number]] Collected: 07/14/25 1130     Updated: 07/14/25 1152    Narrative:      CT ANGIOGRAM CAROTIDS, CT ANGIOGRAM HEAD    Date of Exam: 7/14/2025 10:55 AM EDT    Indication: STROKE ALERT.    Comparison: CT head from earlier the    Technique: CTA of the head and neck was performed before and after the uneventful intravenous administration of iodinated contrast. Reconstructed coronal and sagittal images were also obtained. In addition, a 3-D volume rendered image was created for   interpretation. Automated exposure control and iterative reconstruction methods were used.      Findings:  Within the visualized upper chest are stable post-operative changes within the left lung apex. There is a three-vessel aortic arch. There is heavy atherosclerotic calcification along the aortic arch. The right common carotid artery is widely patent with   mild plaque distally. There is partially calcified plaque along the right carotid bifurcation resulting in focal 60% stenosis along the proximal right internal carotid artery and distally is widely patent. There is moderate stenosis along the origin of   the left common carotid artery with a focal severe stenosis in its mid segment as well, although the artery remains patent throughout. There is partially calcified plaque along the left carotid bifurcation that is not hemodynamically significant. The   left internal carotid artery is widely patent. The right vertebral artery is widely patent. There is moderate to severe stenosis along the proximal left subclavian artery although it remains patent throughout. The left vertebral artery is patent with   mild stenosis in its V3 segment and focal moderate stenosis of  its V4 segment. The vertebral arteries are codominant.    The bilateral middle cerebral, bilateral anterior cerebral, and anterior communicating arteries are widely patent. The basilar and right posterior cerebral arteries are widely patent. The left posterior cerebral artery is patent with areas of mild   atherosclerotic irregularity. No definite posterior communicating artery is identified on either side. No intracranial aneurysm is identified. The visualized portions of the bilateral superior cerebellar, anteroinferior cerebral, and posterior inferior   cerebellar arteries are unremarkable.      Impression:      Impression:  1.Partially calcified plaque along right carotid bifurcation resulting in focal 60% stenosis along proximal right ICA.  2.Partially calcified plaque along left carotid bifurcation that is not hemodynamically significant.  3.Moderate to severe stenosis along proximal left subclavian artery although it remains patent throughout.  4.Left vertebral artery is patent with mild stenosis in its V3 segment and focal moderate stenosis of V4 segment.  5.Major arterial vasculature intracranially at [place] and distally is patent, with no thrombus or aneurysm.        Electronically Signed: Leonardo Herbert MD    7/14/2025 11:50 AM EDT    Workstation ID: [id number]    CT Angiogram Head [[id number]] Collected: 07/14/25 1130     Updated: 07/14/25 1152    Narrative:      CT ANGIOGRAM CAROTIDS, CT ANGIOGRAM HEAD    Date of Exam: 7/14/2025 10:55 AM EDT    Indication: STROKE ALERT.    Comparison: CT head from earlier the    Technique: CTA of the head and neck was performed before and after the uneventful intravenous administration of iodinated contrast. Reconstructed coronal and sagittal images were also obtained. In addition, a 3-D volume rendered image was created for   interpretation. Automated exposure control and iterative reconstruction methods were used.      Findings:  Within the visualized upper  chest are stable post-operative changes within the left lung apex. There is a three-vessel aortic arch. There is heavy atherosclerotic calcification along the aortic arch. The right common carotid artery is widely patent with   mild plaque distally. There is partially calcified plaque along the right carotid bifurcation resulting in focal 60% stenosis along the proximal right internal carotid artery and distally is widely patent. There is moderate stenosis along the origin of   the left common carotid artery with a focal severe stenosis in its mid segment as well, although the artery remains patent throughout. There is partially calcified plaque along the left carotid bifurcation that is not hemodynamically significant. The   left internal carotid artery is widely patent. The right vertebral artery is widely patent. There is moderate to severe stenosis along the proximal left subclavian artery although it remains patent throughout. The left vertebral artery is patent with   mild stenosis in its V3 segment and focal moderate stenosis of its V4 segment. The vertebral arteries are codominant.    The bilateral middle cerebral, bilateral anterior cerebral, and anterior communicating arteries are widely patent. The basilar and right posterior cerebral arteries are widely patent. The left posterior cerebral artery is patent with areas of mild   atherosclerotic irregularity. No definite posterior communicating artery is identified on either side. No intracranial aneurysm is identified. The visualized portions of the bilateral superior cerebellar, anteroinferior cerebral, and posterior inferior   cerebellar arteries are unremarkable.      Impression:      Impression:  1.Partially calcified plaque along right carotid bifurcation resulting in focal 60% stenosis along proximal right ICA.  2.Partially calcified plaque along left carotid bifurcation that is not hemodynamically significant.  3.Moderate to severe stenosis along  proximal left subclavian artery although it remains patent throughout.  4.Left vertebral artery is patent with mild stenosis in its V3 segment and focal moderate stenosis of V4 segment.  5.Major arterial vasculature intracranially at [place] and distally is patent, with no thrombus or aneurysm.        Electronically Signed: Leonardo Herbert MD    7/14/2025 11:50 AM EDT    Workstation ID: [id number]    CT Head Without Contrast Stroke Protocol [[id number]] Collected: 07/14/25 1046     Updated: 07/14/25 1058    Narrative:      CT HEAD WO CONTRAST STROKE PROTOCOL    Date of Exam: 7/14/2025 10:42 AM EDT    Indication: AMS.    Comparison: 7/9/2025    Technique: Axial CT images were obtained of the head without contrast administration.  Reconstructed coronal and sagittal images were also obtained. Automated exposure control and iterative construction methods were used.    Scan Time: 10:40 a.m.  Results discussed with the patient's floor nurse at 10:56 a.m.      Findings:  No intracranial hemorrhage. No edema or mass effect. No loss of gray-white differentiation. Chronic small vessel ischemic changes in the supratentorial white matter and basal ganglia. CSF spaces stable. No abnormal extra-axial fluid collection      Impression:      Impression:    1. No intracranial hemorrhage.  2. No CT changes of acute major vascular territory brain ischemia at this time        Electronically Signed: Karlo Andrea    7/14/2025 10:56 AM EDT    Workstation ID: [id number]    CT Head Without Contrast [[id number]] Collected: 07/09/25 1750     Updated: 07/09/25 1756    Narrative:      CT HEAD WO CONTRAST    Date of Exam: 7/9/2025 5:39 PM EDT    Indication: Altered mental status.    Comparison: CT head June 6, 2025    Technique: Axial CT images were obtained of the head without contrast administration.  Reconstructed coronal and sagittal images were also obtained. Automated exposure control and iterative construction methods were  used.      Findings:  There are periventricular and deep white matter changes which could reflect more chronic small vessel ischemic change. There is vascular calcification in the area of the carotid siphon and distal left vertebral artery. There is bilateral mastoid disease.   There is right middle ear disease surrounding the ossicles. An incidental osteoma is noted in the right frontal sinus.      Impression:      Impression:  1.There are periventricular and deep white matter changes which could reflect more chronic small vessel ischemic change.  2.Atherosclerotic vascular calcification.  3.Bilateral mastoid disease. There is right middle ear disease surrounding the ossicles.  4.Incidental osteoma right frontal sinus.        Electronically Signed: Tony Kurtz MD    7/9/2025 5:54 PM EDT    Workstation ID: [id number]    XR Chest 1 View [[id number]] Collected: 07/09/25 1458     Updated: 07/09/25 1504    Narrative:      XR CHEST 1 VW    Date of Exam: 7/9/2025 2:45 PM EDT    Indication: Weak/Dizzy/AMS triage protocol    Comparison: June 24, 2025    Findings:  Central venous catheter is noted with its tip in the superior vena cava. There is volume loss of the left lung with haziness throughout the left hemithorax which has been noted. Patient has had a left upper lobe lobectomy. There is pleural-based   calcification within the left hemithorax that may relate to prior inflammatory infectious process or potentially asbestos exposure. A left pleural effusion cannot be excluded. There remains a density in the right upper lobe this may be reflective of   posttreatment change. There is no right pleural effusion.    A vascular stent graft is seen in the left upper arm.      Impression:      Impression:  1.Volume loss left lung with haziness throughout the left hemithorax that has been noted. Patient has had a left upper lobe lobectomy.  2.Pleural-based calcification left hemithorax that may relate to prior inflammatory  infectious process or asbestos exposure.  3.Density right upper lobe that may be reflective of posttreatment change.        Electronically Signed: Tony Kurtz MD    7/9/2025 3:02 PM EDT    Workstation ID: [id number]             Labs Pending at Discharge:          Time spent on Discharge including face to face service: 31 minutes  Part of this note may be an electronic transcription/translation of spoken language to printed text using the Dragon Dictation System.     TElectronically signed by Patrick Turcios MD, 07/18/25, 2:10 PM EDT.

## 2025-07-18 NOTE — THERAPY TREATMENT NOTE
Patient Name: Charlette Rai  : 1935    MRN: [id number]                              Today's Date: 2025       Admit Date: 2025    Visit Dx:     ICD-10-CM ICD-9-CM   1. Acute UTI  N39.0 599.0   2. Altered mental status, unspecified altered mental status type  R41.82 780.97   3. Oropharyngeal dysphagia  R13.12 787.22   4. Decreased activities of daily living (ADL)  Z78.9 V49.89   5. Difficulty walking  R26.2 719.7     Patient Active Problem List   Diagnosis    Malignant neoplasm of upper lobe of right lung    Rheumatoid arthritis    Asthma    Chronic heart failure with preserved ejection fraction    Essential hypertension    GERD (gastroesophageal reflux disease)    Hyperlipidemia LDL goal <70    Lumbar spinal stenosis    Migraine    Monoclonal paraproteinemia    Osteopenia    Oxygen dependent    Peripheral neuropathy    Stage 4 chronic kidney disease    Vitamin D deficiency    Carotid artery stenosis    Chronic right-sided thoracic back pain    Peripheral vascular disease of lower extremity    Ex-smoker    De Quervain's tenosynovitis    Arthritis of carpometacarpal (CMC) joint of right thumb    Steal syndrome of dialysis vascular access    Anemia of chronic renal failure, stage 4 (severe)    Aortic stenosis, moderate    Hematoma    End stage renal disease on dialysis    Coronary artery calcification seen on CT scan    Frailty syndrome in geriatric patient    COPD with lower respiratory infection    Coagulation defect, unspecified    Hyperphosphatemia    Hypothyroidism (acquired)    Idiopathic osteoarthritis    Secondary multiple arthritis    Type 2 diabetes mellitus with diabetic peripheral angiopathy without gangrene    Renal artery stenosis    S/P renal artery angioplasty    Atherosclerosis of renal artery    Abnormal serum immunoelectrophoresis    NSVT (nonsustained ventricular tachycardia)    Hypertensive heart and chronic kidney disease with heart failure and with stage 5 chronic kidney  disease, or end stage renal disease    Unspecified protein-calorie malnutrition    IFG (impaired fasting glucose)    Iron deficiency anemia    AV graft malfunction, initial encounter    AV graft malfunction    Weakness    Malignant neoplasm of lower lobe of left lung    Malignant neoplasm of lower lobe, right bronchus or lung    Severe protein-calorie malnutrition     Past Medical History:   Diagnosis Date    Allergic rhinitis     Anaphylactic shock, unspecified, initial encounter 12/05/2023    Anemia     Arthritis     Asthma     USE INHALERS AND NEBULIZERS    Back pain     Bladder disorder     Cancer     LEFT LUNG CANCER 2005 SURGERY AND CHEMO DONE  AND CURRENTLY 4/ 14/22  RIGHT LUNG CANCER HAS ONLY RECEIVED RADIATION THUS FAR    Chronic kidney disease     stage 3    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 DENIES CP BUT DOES HAVE SOA CHRONIC ISSUE COPD/LUNG CANCER    COPD (chronic obstructive pulmonary disease)     FOLLOWED BY DR MARIAJOSE BAILEY    Coronary artery disease     DENIES CP BUT DOES GET SOA MOST OF THE TIME WITH EXERTION BUT OCC AT REST CHRONIC ISSUE COPD/LUNG CANCER    Deep vein thrombosis     Diabetes mellitus     DOES NOT CHECK BS DAILY    Disease of thyroid gland     HYPOTHYROIDISM    Essential hypertension     Gastric ulcer     GERD (gastroesophageal reflux disease)     Heart murmur     History of transfusion     NO ISSUES POST TRANSFUSION WAS MANY YEARS AGO    Hyperlipemia     Leukocytopenia     FOLLOWED BY DR MIR BAILEY    Limb swelling     Lumbago     Lumbar spinal stenosis     Lung cancer     Migraine headache     Multiple joint pain     On home oxygen therapy     4L/NC PRN    Osteopenia     PNA (pneumonia) 05/04/2024    Pseudomonas pneumonia 04/29/2024    Reflux esophagitis     Shortness of breath     Thyroid nodule     HAS ULTRASOUND  YEARLY BEING MONITORED    Vascular disease     Vitamin D deficiency      Past Surgical History:   Procedure Laterality Date    ABDOMINAL SURGERY      ANGIOPLASTY RENAL ARTERY Left 06/14/2024    stent placement    APPENDECTOMY      ARTERIOGRAM N/A 06/14/2024    Procedure: Arteriogram, right radial access, aortogram and renal arteriogram with possible intervention.;  Surgeon: Dio Miguel MD;  Location: [facility] CATH INVASIVE LOCATION;  Service: Peripheral Vascular;  Laterality: N/A;    ARTERIOVENOUS FISTULA/SHUNT SURGERY Left 05/10/2022    Procedure: Left basilic vein transposition;  Surgeon: Wan Barksdale MD;  Location: [facility] MAIN OR;  Service: Vascular;  Laterality: Left;    ARTERIOVENOUS FISTULA/SHUNT SURGERY Left 03/23/2023    Procedure: Ligation of left arm arteriovenous fistula;  Surgeon: Wan Barksdale MD;  Location: [facility] MAIN OR;  Service: Vascular;  Laterality: Left;    ARTERIOVENOUS FISTULA/SHUNT SURGERY Left 11/30/2023    Procedure: Creation of left arm arteriovenous graft;  Surgeon: Wan Barksdale MD;  Location: [facility] MAIN OR;  Service: Vascular;  Laterality: Left;    BRONCHOSCOPY N/A 04/24/2024    Procedure: BRONCHOSCOPY WITH BAL AND WASHINGS;  Surgeon: Khalif Yanez MD;  Location: [facility] ENDOSCOPY;  Service: Pulmonary;  Laterality: N/A;  MUCUS PLUGGING    CARDIAC CATHETERIZATION  1996    CARDIAC SURGERY      CARDIAC SURGERY      fluid drained from heart    CATARACT EXTRACTION, BILATERAL  2003    COLONOSCOPY  2014    ENDOSCOPY  2016    2019    ENDOSCOPY N/A 06/25/2025    Procedure: ESOPHAGOGASTRODUODENOSCOPY;  Surgeon: Tanvi Del Valle MD;  Location: [facility] ENDOSCOPY;  Service: Gastroenterology;  Laterality: N/A;  gastritis, esophagitis    FEMORAL ARTERY STENT Bilateral     HYSTERECTOMY      LEG THROMBECTOMY/EMBOLECTOMY Left 01/29/2025    Procedure: LEFT LOWER EXTREMITY ANGIOGRAM;  Surgeon: Jaden Castañeda MD;  Location: [facility] HYBRID OR;  Service: Vascular;  Laterality: Left;    LUNG  BIOPSY Left 2005    lobectomy upper lung caner    LUNG VOLUME REDUCTION      OTHER SURGICAL HISTORY      artifical joints/limbs    REPLACEMENT TOTAL KNEE Left 2016    SHUNT O GRAM N/A 01/13/2025    Procedure: dialysis shuntogram;  Surgeon: Court Valente MD;  Location: [facility] INVASIVE LOCATION;  Service: Peripheral Vascular;  Laterality: N/A;    UPPER GASTROINTESTINAL ENDOSCOPY        General Information       Row Name 07/18/25 1017          OT Time and Intention    Subjective Information complains of;weakness;fatigue;dizziness  -EG     Document Type therapy note (daily note)  -EG     Mode of Treatment individual therapy;occupational therapy  -EG     Patient Effort adequate  -EG       Row Name 07/18/25 1017          General Information    Existing Precautions/Restrictions fall;weight bearing  WBAT LLE  -EG       Row Name 07/18/25 1017          Safety Issues/Impairments Affecting Functional Mobility    Impairments Affecting Function (Mobility) balance;endurance/activity tolerance;pain;strength  -EG               User Key  (r) = Recorded By, (t) = Taken By, (c) = Cosigned By      Initials Name Provider Type    EG Eladia Guardado OT Occupational Therapist                     Mobility/ADL's       Row Name 07/18/25 1017          Bed Mobility    Bed Mobility supine-sit  -EG     Supine-Sit [place] (Bed Mobility) minimum assist (75% patient effort);verbal cues;nonverbal cues (demo/gesture)  -EG     Assistive Device (Bed Mobility) bed rails;head of bed elevated  -EG     Comment, (Bed Mobility) extended time to perform with max cues and encouragement; Able to use bedrails to promote ind.  -EG       Row Name 07/18/25 1017          Transfers    Transfers bed-chair transfer;sit-stand transfer;stand-sit transfer  -EG       Row Name 07/18/25 1017          Bed-Chair Transfer    Bed-Chair [place] (Transfers) maximum assist (25% patient effort);verbal cues;nonverbal cues (demo/gesture)  -EG     Comment, (Bed-Chair  "Transfer) stand pivot Ax1 with no RW  -EG       Row Name 07/18/25 1017          Sit-Stand Transfer    Sit-Stand [place] (Transfers) maximum assist (25% patient effort);verbal cues;nonverbal cues (demo/gesture)  -EG     Assistive Device (Sit-Stand Transfers) walker, front-wheeled  -EG     Comment, (Sit-Stand Transfer) x3 attempted at EOB to RW, patient with minimal active engagement, educated  and encouraged  -EG       Row Name 07/18/25 1017          Stand-Sit Transfer    Stand-Sit [place] (Transfers) maximum assist (25% patient effort);verbal cues;nonverbal cues (demo/gesture)  -EG       Row Name 07/18/25 1017          Functional Mobility    Functional Mobility- Ind. Level unable to perform  -EG       Row Name 07/18/25 1017          Activities of Daily Living    BADL Assessment/Intervention lower body dressing;toileting  -EG       Row Name 07/18/25 1017          Lower Body Dressing Assessment/Training    [place] Level (Lower Body Dressing) lower body dressing skills;don;socks;maximum assist (25% patient effort)  -EG       Row Name 07/18/25 1017          Toileting Assessment/Training    [place] Level (Toileting) toileting skills;dependent (less than 25% patient effort);adjust/manage clothing;perform perineal hygiene  -EG     Comment, (Toileting) patient reports \"I am peeing\" when OT enters the room; OT asked patient why she did not push to call light to ask for assistance, patient stated, \"I go to dialysis, I havent even peed since yesterday\"  -EG               User Key  (r) = Recorded By, (t) = Taken By, (c) = Cosigned By      Initials Name Provider Type    EG Eladia Guardado OT Occupational Therapist                   Obj/Interventions       Row Name 07/18/25 1020          Sensory Assessment (Somatosensory)    Sensory Assessment (Somatosensory) UE sensation intact  -EG       Row Name 07/18/25 1020          Vision Assessment/Intervention    Visual Impairment/Limitations WFL  -EG       Row Name " 07/18/25 1020          Balance    Balance Interventions standing;sit to stand;supported;sitting;static  -EG               User Key  (r) = Recorded By, (t) = Taken By, (c) = Cosigned By      Initials Name Provider Type    Eladia Jaramillo OT Occupational Therapist                   Goals/Plan    No documentation.                  Clinical Impression       Row Name 07/18/25 1020          Pain Assessment    Pretreatment Pain Rating 4/10  -EG     Posttreatment Pain Rating 7/10  -EG       Row Name 07/18/25 1020          Plan of Care Review    Plan of Care Reviewed With patient  -EG     Progress no change  -EG     Outcome Evaluation Patient much more alert and able to hold conversation with OT this am as opposed to most recent nursing notes; MAx encouragement for all OOB activity required, extended time for all tasks, displays decreased motivation but is physically capable of task performance/particiaption just needs increased positive reinforcement; Transfer training for theract performed with max/TD this date; OT will continue to treat and follow POC.  -EG       [facility] Name 07/18/25 1020          Therapy Assessment/Plan (OT)    Rehab Potential (OT) fair  -EG     Criteria for Skilled Therapeutic Interventions Met (OT) yes;meets criteria;skilled treatment is necessary  -EG     Therapy Frequency (OT) 5 times/wk  -EG       Row Name 07/18/25 1020          Therapy Plan Review/Discharge Plan (OT)    Anticipated Discharge Disposition (OT) sub acute care setting  -EG       [facility] Name 07/18/25 1020          Positioning and Restraints    Pre-Treatment Position in bed  -EG     Post Treatment Position chair  -EG     In Chair reclined;sitting;call light within reach;encouraged to call for assist;exit alarm on  -EG               User Key  (r) = Recorded By, (t) = Taken By, (c) = Cosigned By      Initials Name Provider Type    Eladia Jaramillo OT Occupational Therapist                   Outcome Measures       Row Name 07/18/25 1022           How much help from another is currently needed...    Putting on and taking off regular lower body clothing? 1  -EG     Bathing (including washing, rinsing, and drying) 2  -EG     Toileting (which includes using toilet bed pan or urinal) 1  -EG     Putting on and taking off regular upper body clothing 2  -EG     Taking care of personal grooming (such as brushing teeth) 3  -EG     Eating meals --  NPO orders; SLP to eval today  -EG       Row Name 07/18/25 1022          Functional Assessment    Outcome Measure Options AM-PAC 6 Clicks Daily Activity (OT);Optimal Instrument  -EG       Row Name 07/18/25 1022          Optimal Instrument    Optimal Instrument Optimal - 3  -EG     Bending/Stooping 5  -EG     Standing 4  -EG     Reaching 2  -EG               User Key  (r) = Recorded By, (t) = Taken By, (c) = Cosigned By      Initials Name Provider Type    EG Eladia Guardado OT Occupational Therapist                      OT Recommendation and Plan  Therapy Frequency (OT): 5 times/wk  Plan of Care Review  Plan of Care Reviewed With: patient  Progress: no change  Outcome Evaluation: Patient much more alert and able to hold conversation with OT this am as opposed to most recent nursing notes; MAx encouragement for all OOB activity required, extended time for all tasks, displays decreased motivation but is physically capable of task performance/particiaption just needs increased positive reinforcement; Transfer training for theract performed with max/TD this date; OT will continue to treat and follow POC.     Time Calculation:         Time Calculation- OT       Row Name 07/18/25 1023             Time Calculation- OT    OT Received On 07/18/25  -EG      OT Goal Re-Cert Due Date 07/20/25  -EG         Timed Charges    65850 - OT Therapeutic Activity Minutes 17  -EG      55954 - OT Self Care/Mgmt Minutes 8  -EG         Total Minutes    Timed Charges Total Minutes 25  -EG       Total Minutes 25  -EG                User Key   (r) = Recorded By, (t) = Taken By, (c) = Cosigned By      Initials Name Provider Type    EG Eladia Guardado OT Occupational Therapist                  Therapy Charges for Today       Code Description Service Date Service Provider Modifiers Qty    52917477549  OT THERAPEUTIC ACT EA 15 MIN 7/18/2025 Eladia Guardado OT GO 1    03025272816  OT SELF CARE/MGMT/TRAIN EA 15 MIN 7/18/2025 Eladia Guardado OT GO 1                 Eladia Guardado OT  7/18/2025

## 2025-07-18 NOTE — SIGNIFICANT NOTE
07/18/25 1200   Physical Therapy Time and Intention   Session Not Performed patient unavailable for treatment;other (see comments)  (pt in dialysis)

## 2025-07-18 NOTE — PLAN OF CARE
Goal Outcome Evaluation:  Plan of Care Reviewed With: patient, spouse, child        Progress: improving  Outcome Evaluation: patient alert and oriented throughout the shift, rested in bed throughout the day with infrequent complaints of pain and discomfort. went to dialysis and back to floor. seen by slp this morning and diet changed back to regular soft. patient able to take meds whole with applesauce without complications. Wound care pics taken and wound care performed by [facility] today. Discharged to [facility]. Report called to JORGE LUIS Sanchez. Family at bedside. Transported via Located within [facility]. All belongings sent with patient's family. Hemodialysis catheter intact and no peripheral IV. Flex monitor removed and sent back to monitor room. Discharge information reviewed. No concerns.

## 2025-07-18 NOTE — THERAPY TREATMENT NOTE
Acute Care - Speech Language Pathology   Swallow Treatment Note MAURICE Escamilla     Patient Name: Charlette Rai  : 1935  MRN: [id number]  Today's Date: 2025               Admit Date: 2025    Visit Dx:     ICD-10-CM ICD-9-CM   1. Acute UTI  N39.0 599.0   2. Altered mental status, unspecified altered mental status type  R41.82 780.97   3. Oropharyngeal dysphagia  R13.12 787.22   4. Decreased activities of daily living (ADL)  Z78.9 V49.89   5. Difficulty walking  R26.2 719.7     Patient Active Problem List   Diagnosis    Malignant neoplasm of upper lobe of right lung    Rheumatoid arthritis    Asthma    Chronic heart failure with preserved ejection fraction    Essential hypertension    GERD (gastroesophageal reflux disease)    Hyperlipidemia LDL goal <70    Lumbar spinal stenosis    Migraine    Monoclonal paraproteinemia    Osteopenia    Oxygen dependent    Peripheral neuropathy    Stage 4 chronic kidney disease    Vitamin D deficiency    Carotid artery stenosis    Chronic right-sided thoracic back pain    Peripheral vascular disease of lower extremity    Ex-smoker    De Quervain's tenosynovitis    Arthritis of carpometacarpal (CMC) joint of right thumb    Steal syndrome of dialysis vascular access    Anemia of chronic renal failure, stage 4 (severe)    Aortic stenosis, moderate    Hematoma    End stage renal disease on dialysis    Coronary artery calcification seen on CT scan    Frailty syndrome in geriatric patient    COPD with lower respiratory infection    Coagulation defect, unspecified    Hyperphosphatemia    Hypothyroidism (acquired)    Idiopathic osteoarthritis    Secondary multiple arthritis    Type 2 diabetes mellitus with diabetic peripheral angiopathy without gangrene    Renal artery stenosis    S/P renal artery angioplasty    Atherosclerosis of renal artery    Abnormal serum immunoelectrophoresis    NSVT (nonsustained ventricular tachycardia)    Hypertensive heart and chronic kidney disease  with heart failure and with stage 5 chronic kidney disease, or end stage renal disease    Unspecified protein-calorie malnutrition    IFG (impaired fasting glucose)    Iron deficiency anemia    AV graft malfunction, initial encounter    AV graft malfunction    Weakness    Malignant neoplasm of lower lobe of left lung    Malignant neoplasm of lower lobe, right bronchus or lung    Severe protein-calorie malnutrition     Past Medical History:   Diagnosis Date    Allergic rhinitis     Anaphylactic shock, unspecified, initial encounter 12/05/2023    Anemia     Arthritis     Asthma     USE INHALERS AND NEBULIZERS    Back pain     Bladder disorder     Cancer     LEFT LUNG CANCER 2005 SURGERY AND CHEMO DONE  AND CURRENTLY 4/ 14/22  RIGHT LUNG CANCER HAS ONLY RECEIVED RADIATION THUS FAR    Chronic kidney disease     stage 3    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 DENIES CP BUT DOES HAVE SOA CHRONIC ISSUE COPD/LUNG CANCER    COPD (chronic obstructive pulmonary disease)     FOLLOWED BY DR MARIAJOSE BAILEY    Coronary artery disease     DENIES CP BUT DOES GET SOA MOST OF THE TIME WITH EXERTION BUT OCC AT REST CHRONIC ISSUE COPD/LUNG CANCER    Deep vein thrombosis     Diabetes mellitus     DOES NOT CHECK BS DAILY    Disease of thyroid gland     HYPOTHYROIDISM    Essential hypertension     Gastric ulcer     GERD (gastroesophageal reflux disease)     Heart murmur     History of transfusion     NO ISSUES POST TRANSFUSION WAS MANY YEARS AGO    Hyperlipemia     Leukocytopenia     FOLLOWED BY DR MIR BAILEY    Limb swelling     Lumbago     Lumbar spinal stenosis     Lung cancer     Migraine headache     Multiple joint pain     On home oxygen therapy     4L/NC PRN    Osteopenia     PNA (pneumonia) 05/04/2024    Pseudomonas pneumonia 04/29/2024    Reflux esophagitis      Shortness of breath     Thyroid nodule     HAS ULTRASOUND YEARLY BEING MONITORED    Vascular disease     Vitamin D deficiency      Past Surgical History:   Procedure Laterality Date    ABDOMINAL SURGERY      ANGIOPLASTY RENAL ARTERY Left 06/14/2024    stent placement    APPENDECTOMY      ARTERIOGRAM N/A 06/14/2024    Procedure: Arteriogram, right radial access, aortogram and renal arteriogram with possible intervention.;  Surgeon: Dio Miguel MD;  Location: [facility] CATH INVASIVE LOCATION;  Service: Peripheral Vascular;  Laterality: N/A;    ARTERIOVENOUS FISTULA/SHUNT SURGERY Left 05/10/2022    Procedure: Left basilic vein transposition;  Surgeon: Wan Barksdale MD;  Location: [facility] MAIN OR;  Service: Vascular;  Laterality: Left;    ARTERIOVENOUS FISTULA/SHUNT SURGERY Left 03/23/2023    Procedure: Ligation of left arm arteriovenous fistula;  Surgeon: Wan Barksdale MD;  Location: [facility] MAIN OR;  Service: Vascular;  Laterality: Left;    ARTERIOVENOUS FISTULA/SHUNT SURGERY Left 11/30/2023    Procedure: Creation of left arm arteriovenous graft;  Surgeon: Wan Barksdale MD;  Location: [facility] MAIN OR;  Service: Vascular;  Laterality: Left;    BRONCHOSCOPY N/A 04/24/2024    Procedure: BRONCHOSCOPY WITH BAL AND WASHINGS;  Surgeon: Khalif Yanez MD;  Location: [facility] ENDOSCOPY;  Service: Pulmonary;  Laterality: N/A;  MUCUS PLUGGING    CARDIAC CATHETERIZATION  1996    CARDIAC SURGERY      CARDIAC SURGERY      fluid drained from heart    CATARACT EXTRACTION, BILATERAL  2003    COLONOSCOPY  2014    ENDOSCOPY  2016    2019    ENDOSCOPY N/A 06/25/2025    Procedure: ESOPHAGOGASTRODUODENOSCOPY;  Surgeon: Tanvi Del Valle MD;  Location: [facility] ENDOSCOPY;  Service: Gastroenterology;  Laterality: N/A;  gastritis, esophagitis    FEMORAL ARTERY STENT Bilateral     HYSTERECTOMY      LEG THROMBECTOMY/EMBOLECTOMY Left 01/29/2025    Procedure: LEFT LOWER EXTREMITY ANGIOGRAM;  Surgeon: Jaden Castañeda MD;  Location: [facility]  HYBRID OR;  Service: Vascular;  Laterality: Left;    LUNG BIOPSY Left 2005    lobectomy upper lung caner    LUNG VOLUME REDUCTION      OTHER SURGICAL HISTORY      artifical joints/limbs    REPLACEMENT TOTAL KNEE Left 2016    SHUNT O GRAM N/A 01/13/2025    Procedure: dialysis shuntogram;  Surgeon: Court Valente MD;  [facility];  Service: Peripheral Vascular;  Laterality: N/A;    UPPER GASTROINTESTINAL ENDOSCOPY         SLP Recommendation and Plan               SPEECH PATHOLOGY DYSPHAGIA TREATMENT    Subjective/Behavioral Observations: significant improvement in mental status. Per nursing report, patient given Narcan x2 last pm. Patient sitting on side of bed, alert, cooperative.         Day/time of Treatment:7.18.25        Current Diet:npo        Results of treatment: Due to improvement in mental status, treatment focused on trials of po for possible re-initiation of po diet.   Thin liquids by cup and single straw sip. Swallows completed with minimal delay. No s/s of aspiration. Pureed and soft solids with patient assisting for self feeding. Swallows completed with minimal delay. No overt s/s of aspiration.        Progress toward goals:progressing        Barriers to Achieving goals: medical status        Plan of care:/changes in plan:     Diet: mechanical soft solids, thin liquids  Fully upright for all po, 30 minutes following.  Assist for meal set up, small bites and sips, meds whole in applesauce                                                                                       EDUCATION  The patient has been educated in the following areas:   Dysphagia (Swallowing Impairment).                Time Calculation:    Time Calculation- SLP       Row Name 07/18/25 1058             Time Calculation- SLP    SLP Stop Time 0645  -SN      SLP Received On 07/18/25  -SN         Untimed Charges    67571-KS Treatment Swallow Minutes 45  -SN         Total Minutes    Untimed Charges Total Minutes  45  -SN       Total Minutes 45  -SN                User Key  (r) = Recorded By, (t) = Taken By, (c) = Cosigned By      Initials Name Provider Type    Denise Osorio MS-CCC/SLP, VISH Speech and Language Pathologist                    Therapy Charges for Today       Code Description Service Date Service Provider Modifiers Qty    12778353297 HC ST TREATMENT SWALLOW 3 7/17/2025 Denise Crowell MS-CCC/SLP, VISH GN 1    [id number] HC ST TREATMENT SWALLOW 3 7/18/2025 Denise Crowell MS-CCC/SLP, VISH GN 1                 KAYCEE Watson/VISH BILL  7/18/2025

## 2025-07-18 NOTE — SIGNIFICANT NOTE
Wound Eval / Progress Noted    MAURICE Escamilla     Patient Name: Charlette Rai  : 1935  MRN: [id number]  Today's Date: 2025                 Admit Date: 2025    Visit Dx:    ICD-10-CM ICD-9-CM   1. Acute UTI  N39.0 599.0   2. Altered mental status, unspecified altered mental status type  R41.82 780.97   3. Oropharyngeal dysphagia  R13.12 787.22   4. Decreased activities of daily living (ADL)  Z78.9 V49.89   5. Difficulty walking  R26.2 719.7         * No active hospital problems. *        Past Medical History:   Diagnosis Date    Allergic rhinitis     Anaphylactic shock, unspecified, initial encounter 2023    Anemia     Arthritis     Asthma     USE INHALERS AND NEBULIZERS    Back pain     Bladder disorder     Cancer     LEFT LUNG CANCER  SURGERY AND CHEMO DONE  AND CURRENTLY   RIGHT LUNG CANCER HAS ONLY RECEIVED RADIATION THUS FAR    Chronic kidney disease     stage 3    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 DENIES CP BUT DOES HAVE SOA CHRONIC ISSUE COPD/LUNG CANCER    COPD (chronic obstructive pulmonary disease)     FOLLOWED BY DR MARIAJOSE BAILEY    Coronary artery disease     DENIES CP BUT DOES GET SOA MOST OF THE TIME WITH EXERTION BUT OCC AT REST CHRONIC ISSUE COPD/LUNG CANCER    Deep vein thrombosis     Diabetes mellitus     DOES NOT CHECK BS DAILY    Disease of thyroid gland     HYPOTHYROIDISM    Essential hypertension     Gastric ulcer     GERD (gastroesophageal reflux disease)     Heart murmur     History of transfusion     NO ISSUES POST TRANSFUSION WAS MANY YEARS AGO    Hyperlipemia     Leukocytopenia     FOLLOWED BY DR MIR BAILEY    Limb swelling     Lumbago     Lumbar spinal stenosis     Lung cancer     Migraine headache     Multiple joint pain     On home oxygen therapy     4L/NC PRN    Osteopenia     PNA  (pneumonia) 05/04/2024    Pseudomonas pneumonia 04/29/2024    Reflux esophagitis     Shortness of breath     Thyroid nodule     HAS ULTRASOUND YEARLY BEING MONITORED    Vascular disease     Vitamin D deficiency         Past Surgical History:   Procedure Laterality Date    ABDOMINAL SURGERY      ANGIOPLASTY RENAL ARTERY Left 06/14/2024    stent placement    APPENDECTOMY      ARTERIOGRAM N/A 06/14/2024    Procedure: Arteriogram, right radial access, aortogram and renal arteriogram with possible intervention.;  Surgeon: Dio Miguel MD;  [facility] CATH INVASIVE LOCATION;  Service: Peripheral Vascular;  Laterality: N/A;    ARTERIOVENOUS FISTULA/SHUNT SURGERY Left 05/10/2022    Procedure: Left basilic vein transposition;  Surgeon: Wan Barksdale MD;  Location: [facility] MAIN OR;  Service: Vascular;  Laterality: Left;    ARTERIOVENOUS FISTULA/SHUNT SURGERY Left 03/23/2023    Procedure: Ligation of left arm arteriovenous fistula;  Surgeon: Wan Barksdale MD;  Location: [facility] MAIN OR;  Service: Vascular;  Laterality: Left;    ARTERIOVENOUS FISTULA/SHUNT SURGERY Left 11/30/2023    Procedure: Creation of left arm arteriovenous graft;  Surgeon: Wan Barksdale MD;  Location: [facility] MAIN OR;  Service: Vascular;  Laterality: Left;    BRONCHOSCOPY N/A 04/24/2024    Procedure: BRONCHOSCOPY WITH BAL AND WASHINGS;  Surgeon: Khalif Yanez MD;  Location: [facility] ENDOSCOPY;  Service: Pulmonary;  Laterality: N/A;  MUCUS PLUGGING    CARDIAC CATHETERIZATION  1996    CARDIAC SURGERY      CARDIAC SURGERY      fluid drained from heart    CATARACT EXTRACTION, BILATERAL  2003    COLONOSCOPY  2014    ENDOSCOPY  2016    2019    ENDOSCOPY N/A 06/25/2025    Procedure: ESOPHAGOGASTRODUODENOSCOPY;  Surgeon: Tanvi Del Valle MD;  Location: [facility] ENDOSCOPY;  Service: Gastroenterology;  Laterality: N/A;  gastritis, esophagitis    FEMORAL ARTERY STENT Bilateral     HYSTERECTOMY      LEG THROMBECTOMY/EMBOLECTOMY Left 01/29/2025     Procedure: LEFT LOWER EXTREMITY ANGIOGRAM;  Surgeon: Jaden Castañeda MD;  Location: [facility] HYBRID OR;  Service: Vascular;  Laterality: Left;    LUNG BIOPSY Left 2005    lobectomy upper lung caner    LUNG VOLUME REDUCTION      OTHER SURGICAL HISTORY      artifical joints/limbs    REPLACEMENT TOTAL KNEE Left 2016    SHUNT O GRAM N/A 01/13/2025    Procedure: dialysis shuntogram;  Surgeon: Court Valente MD;  Location: [facility] CATH INVASIVE LOCATION;  Service: Peripheral Vascular;  Laterality: N/A;    UPPER GASTROINTESTINAL ENDOSCOPY           Physical Assessment:  Wound Right posterior heel Pressure Injury (Active)   Wound Image   07/18/25 0650   Pressure Injury Stage DTPI 07/18/25 0650   Dressing Appearance intact;dry 07/18/25 0650   Dressing Removed Type non-adherent;petroleum-based;gauze;silicone border foam 07/18/25 0650   Confirmed Empty Wound Bed Yes, visual inspection of wound bed 07/18/25 0650   Closure None 07/18/25 0650   Base dry;maroon/purple;nonblanchable 07/18/25 0650   Periwound intact;dry;pink 07/18/25 0650   Periwound Temperature warm 07/18/25 0650   Periwound Skin Turgor soft 07/18/25 0650   Edges rolled/closed 07/18/25 0650   Drainage Amount none 07/18/25 0650   Care, Wound cleansed with;sterile normal saline 07/18/25 0650   Dressing Care dressing applied;non-adherent;petroleum-based;gauze;silicone border foam 07/18/25 0650   Periwound Care absorptive dressing applied 07/18/25 0650       Wound 07/15/25 1213 Right gluteal Traumatic (Active)   Wound Image   07/18/25 0650   Dressing Appearance open to air 07/18/25 0650   Confirmed Empty Wound Bed Yes, visual inspection of wound bed 07/18/25 0650   Closure None 07/18/25 0650   Base blanchable;red;moist 07/18/25 0650   Periwound intact;pink 07/18/25 0650   Periwound Temperature warm 07/18/25 0650   Periwound Skin Turgor soft 07/18/25 0650   Edges open 07/18/25 0650   Drainage Characteristics/Odor serosanguineous 07/18/25 0650   Drainage Amount scant  07/18/25 0650   Care, Wound cleansed with;sterile normal saline 07/18/25 0650   Dressing Care open to air;skin barrier agent applied 07/18/25 0650   Periwound Care barrier ointment applied 07/18/25 0650        Wound Check / Follow-up: Patient was seen today for a wound check and dressing change. Patient was awake, alert and oriented at the time of the assessment; patient was agreeable to the visit.    Buttocks with worsening to friction / shearing, now with scattered areas of red moist, blanchable tissue. Recommending to apply silver impregnated hydrofiber to the wound bases and secure with a silicone border dressing and change the dressing daily. Implement Q2H turns and offload at all times. Keep the patient clean and free from all moisture.    DTPI remains present to the right heel, tissue is maroon / purple; tissue remains intact and non-blanchable. Recommending to continue the current wound care with non-adherent petroleum gauze, change the dressing every other day.    Impression: DTPI to right heel, friction / shearing to the buttocks    Short term goals:  regain skin integrity, skin protection, topical treatment, pressure reduction, moisture prevention, daily and every other day dressing changes.    Hamida Gonzalez RN    7/18/2025    08:42 EDT

## 2025-07-21 ENCOUNTER — NURSING HOME (OUTPATIENT)
Dept: INTERNAL MEDICINE | Age: 89
End: 2025-07-21
Payer: MEDICARE

## 2025-07-21 DIAGNOSIS — C34.31 MALIGNANT NEOPLASM OF LOWER LOBE, RIGHT BRONCHUS OR LUNG: ICD-10-CM

## 2025-07-21 DIAGNOSIS — E03.9 HYPOTHYROIDISM (ACQUIRED): ICD-10-CM

## 2025-07-21 DIAGNOSIS — I50.32 CHRONIC HEART FAILURE WITH PRESERVED EJECTION FRACTION: ICD-10-CM

## 2025-07-21 DIAGNOSIS — N18.6 END STAGE RENAL DISEASE ON DIALYSIS: ICD-10-CM

## 2025-07-21 DIAGNOSIS — Z99.2 END STAGE RENAL DISEASE ON DIALYSIS: ICD-10-CM

## 2025-07-21 DIAGNOSIS — E11.51 TYPE 2 DIABETES MELLITUS WITH DIABETIC PERIPHERAL ANGIOPATHY WITHOUT GANGRENE, WITHOUT LONG-TERM CURRENT USE OF INSULIN: ICD-10-CM

## 2025-07-21 DIAGNOSIS — R53.1 WEAKNESS: ICD-10-CM

## 2025-07-21 DIAGNOSIS — I35.0 AORTIC STENOSIS, MODERATE: ICD-10-CM

## 2025-07-21 DIAGNOSIS — N30.00 ACUTE CYSTITIS WITHOUT HEMATURIA: ICD-10-CM

## 2025-07-21 DIAGNOSIS — R19.7 DIARRHEA, UNSPECIFIED TYPE: ICD-10-CM

## 2025-07-21 DIAGNOSIS — I70.1 RENAL ARTERY STENOSIS: ICD-10-CM

## 2025-07-21 DIAGNOSIS — R54 FRAILTY SYNDROME IN GERIATRIC PATIENT: ICD-10-CM

## 2025-07-21 DIAGNOSIS — G93.41 METABOLIC ENCEPHALOPATHY: Primary | ICD-10-CM

## 2025-07-21 DIAGNOSIS — I73.9 PERIPHERAL VASCULAR DISEASE OF LOWER EXTREMITY: ICD-10-CM

## 2025-07-21 PROCEDURE — 99306 1ST NF CARE HIGH MDM 50: CPT | Performed by: INTERNAL MEDICINE

## 2025-07-21 NOTE — PROGRESS NOTES
Nursing Home History and Physical Note      Brennan Mosqueda MD  [x]  [address], [address]  [place] [address]  Phone: [phone]  Fax: [phone]     PATIENT NAME: Charlette Rai                                                                          YOB: 1935            DATE OF SERVICE: 07/21/2025  FACILITY:   [] [facility]   [x] [facility]  [] [facility]  [] Other      HISTORY OF PRESENT ILLNESS: Patient is a pleasant 89-year-old female who came back from the hospital with UTI and altered mental status toxic encephalopathy possibly medication effects, she finished her course of antibiotics, and she continues on dialysis, she has multiple other medical issues she is currently awake alert pleasant cooperative in no distress, she also fell recently and had a fracture of her left femur and had that repaired at [facility] she says a month or 2 ago, she is very weak appearing  According to her hospital course:    Charlette Rai is a 89 y.o. female with a past medical history significant for peripheral arterial disease, renal artery stenosis status post stenting, hypertension, ESRD on HD, diastolic CHF, COPD, lung cancer on chemotherapy, type 2 diabetes mellitus, reportedly patient has been having significant diarrhea for several days, has not eaten for couple days, had been getting IV fluids with dialysis because of hypotension, patient was recently in the hospital from 6/24 to 7/3/2025 at that time she came in with complaint of hematemesis and worsening weakness.  Patient had EGD which showed esophagitis and gastritis.  Patient did receive a unit of blood at that time.  Patient was restarted on aspirin at discharge.  Of note on 6/11/2025 patient had a fall and acute periprosthetic femoral fracture and was sent to [facility] for repair.  In the emergency department the patient was found to be  hypotensive, was 78% on room air, chest x-ray showed volume loss and haziness throughout the left hemithorax.  Of note patient does have a history of lung cancer and left upper lobe ectomy patient underwent CT scan of the head which was negative for any acute abnormalities however did have some chronic small vessel ischemic changes.  Patient was found to have UTI via UA.  Patient was admitted to the hospital for further workup and management.  Patient initially improved in her mental status, she was alert and oriented, complaining of pain, she did tolerate pain medications well. On the morning of 7/14/2025 patient more altered, drowsy, states she is just not feeling right.  There was some concern for some possible left upper extremity weakness although this was not noted by teleneurology as they felt the exam was symmetric.  Stroke RRT was called and CTA showed right ICA 60% stenosis due to plaque.  Per patient this is chronic.  MRI of the brain came back negative.  Neuro signed off episode appears to be metabolic likely medications..  On July 17 patient obtunded in the morning notified by nurse.  On evaluation patient noticed responding to vocal commands.  Minimal response to painful stimulus.  Patient given Narcan x 2 with improvement.  Patient had 2.5 mg oxycodone yesterday late afternoon.  Earlier in the morning patient was awake and alert asking for pills.  Stat CT head negative.  ABG not significant  Nursing staff also noted right upper extremity cold with nonpalpable radial pulse.  Vascular consulted.  Stat arterial ultrasound right upper extremity with good velocities and waveform.  Subsequently her right upper extremity became warm and back to normal.  No intervention as per vascular.  By afternoon patient was gradually returning to her baseline.     Interval Followup:   Vital stable.  Remains on 2 L.  Patient is awake alert oriented.  Continues to have diarrhea.  Stool culture came back negative.  Blood  sugar on the lower side.  As patient did not eat well yesterday because of change in mental status.  Patient responded well to Narcan.  Oxycodone DC'd as patient becomes very obtunded even with small dose  Patient having her lunch during my rounds tolerating it well      PAST MEDICAL & SURGICAL HISTORY:   Past Medical History:   Diagnosis Date    Allergic rhinitis     Anaphylactic shock, unspecified, initial encounter 12/05/2023    Anemia     Arthritis     Asthma     USE INHALERS AND NEBULIZERS    Back pain     Bladder disorder     Cancer     LEFT LUNG CANCER 2005 SURGERY AND CHEMO DONE  AND CURRENTLY 4/ 14/22  RIGHT LUNG CANCER HAS ONLY RECEIVED RADIATION THUS FAR    Chronic kidney disease     stage 3    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 DENIES CP BUT DOES HAVE SOA CHRONIC ISSUE COPD/LUNG CANCER    COPD (chronic obstructive pulmonary disease)     FOLLOWED BY DR MARIAJOSE BAILEY    Coronary artery disease     DENIES CP BUT DOES GET SOA MOST OF THE TIME WITH EXERTION BUT OCC AT REST CHRONIC ISSUE COPD/LUNG CANCER    Deep vein thrombosis     Diabetes mellitus     DOES NOT CHECK BS DAILY    Disease of thyroid gland     HYPOTHYROIDISM    Essential hypertension     Gastric ulcer     GERD (gastroesophageal reflux disease)     Heart murmur     History of transfusion     NO ISSUES POST TRANSFUSION WAS MANY YEARS AGO    Hyperlipemia     Leukocytopenia     FOLLOWED BY DR MIR BAILEY    Limb swelling     Lumbago     Lumbar spinal stenosis     Lung cancer     Migraine headache     Multiple joint pain     On home oxygen therapy     4L/NC PRN    Osteopenia     PNA (pneumonia) 05/04/2024    Pseudomonas pneumonia 04/29/2024    Reflux esophagitis     Shortness of breath     Thyroid nodule     HAS ULTRASOUND YEARLY BEING MONITORED    Vascular disease     Vitamin D  deficiency       Past Surgical History:   Procedure Laterality Date    ABDOMINAL SURGERY      ANGIOPLASTY RENAL ARTERY Left 06/14/2024    stent placement    APPENDECTOMY      ARTERIOGRAM N/A 06/14/2024    Procedure: Arteriogram, right radial access, aortogram and renal arteriogram with possible intervention.;  Surgeon: Dio Miguel MD;  Location: [facility] CATH INVASIVE LOCATION;  Service: Peripheral Vascular;  Laterality: N/A;    ARTERIOVENOUS FISTULA/SHUNT SURGERY Left 05/10/2022    Procedure: Left basilic vein transposition;  Surgeon: Wan Barksdale MD;  Location: [facility] MAIN OR;  Service: Vascular;  Laterality: Left;    ARTERIOVENOUS FISTULA/SHUNT SURGERY Left 03/23/2023    Procedure: Ligation of left arm arteriovenous fistula;  Surgeon: Wan Barksdale MD;  Location: [facility] MAIN OR;  Service: Vascular;  Laterality: Left;    ARTERIOVENOUS FISTULA/SHUNT SURGERY Left 11/30/2023    Procedure: Creation of left arm arteriovenous graft;  Surgeon: Wan Barksdale MD;  Location: [facility] MAIN OR;  Service: Vascular;  Laterality: Left;    BRONCHOSCOPY N/A 04/24/2024    Procedure: BRONCHOSCOPY WITH BAL AND WASHINGS;  Surgeon: Khalif Yanez MD;  Location: [facility] ENDOSCOPY;  Service: Pulmonary;  Laterality: N/A;  MUCUS PLUGGING    CARDIAC CATHETERIZATION  1996    CARDIAC SURGERY      CARDIAC SURGERY      fluid drained from heart    CATARACT EXTRACTION, BILATERAL  2003    COLONOSCOPY  2014    ENDOSCOPY  2016    2019    ENDOSCOPY N/A 06/25/2025    Procedure: ESOPHAGOGASTRODUODENOSCOPY;  Surgeon: Tanvi Del Valle MD;  Location: [facility] ENDOSCOPY;  Service: Gastroenterology;  Laterality: N/A;  gastritis, esophagitis    FEMORAL ARTERY STENT Bilateral     HYSTERECTOMY      LEG THROMBECTOMY/EMBOLECTOMY Left 01/29/2025    Procedure: LEFT LOWER EXTREMITY ANGIOGRAM;  Surgeon: Jaden Castañeda MD;  Location: [facility] HYBRID OR;  Service: Vascular;  Laterality: Left;    LUNG BIOPSY Left 2005    lobectomy upper lung caner    LUNG  VOLUME REDUCTION      OTHER SURGICAL HISTORY      artifical joints/limbs    REPLACEMENT TOTAL KNEE Left 2016    SHUNT O GRAM N/A 2025    Procedure: dialysis shuntogram;  Surgeon: Court Valente MD;  Location: [facility] INVASIVE LOCATION;  Service: Peripheral Vascular;  Laterality: N/A;    UPPER GASTROINTESTINAL ENDOSCOPY            MEDICATIONS:  I have reviewed and reconciled the patients medication list in the patients chart at the skilled nursing facility today.      ALLERGIES:  No Known Allergies      SOCIAL HISTORY:  Social History     Socioeconomic History    Marital status:    Tobacco Use    Smoking status: Former     Current packs/day: 0.00     Average packs/day: 1 pack/day for 52.5 years (52.5 ttl pk-yrs)     Types: Cigarettes     Start date:      Quit date: 2004     Years since quittin.0     Passive exposure: Past    Smokeless tobacco: Never   Vaping Use    Vaping status: Never Used   Substance and Sexual Activity    Alcohol use: Not Currently     Comment: beer many years ago    Drug use: Never    Sexual activity: Defer       FAMILY HISTORY:  Family History   Problem Relation Age of Onset    Arthritis Mother     Arthritis Father     Cancer Brother     Diabetes Brother     Other Brother         blood disease     Prostate cancer Brother     Cancer Brother     Prostate cancer Brother     Cancer Brother     Cancer Brother     Malig Hyperthermia Neg Hx     Colon cancer Neg Hx          PHYSICAL EXAMINATION:   VITAL SIGNS: 160/62 temperature 97.4 sat 99% on 2 L of oxygen pulse 77 respiratory rate 18 weight is 140.4 pounds    PHYSICAL EXAM: Her lungs were clear laterally and anteriorly cardiac exam reveals a regular rhythm she has a 3/6 systolic murmur heard at the left upper right upper sternal border, her left leg is diffusely larger than the right with a few incision puncture site type incisions that are healed on the left proximal thigh area, laterally, she is alert and oriented x  2-3 she has a dialysis catheter in the right upper chest wall, her abdomen is soft nondistended her right lower leg shows no significant edema, she moves her arms and hands fairly well to command her neck is supple her skin is dry pale throughout, and she is generally weak appearing      RECORDS REVIEW:   Orders Reviewed.  Labs Reviewed.      ICD-10-CM ICD-9-CM   1. Metabolic encephalopathy  G93.41 348.31   2. Chronic heart failure with preserved ejection fraction  I50.32 428.9   3. Renal artery stenosis  I70.1 440.1   4. Type 2 diabetes mellitus with diabetic peripheral angiopathy without gangrene, without long-term current use of insulin  E11.51 250.70     443.81   5. End stage renal disease on dialysis  N18.6 585.6    Z99.2 V45.11   6. Weakness  R53.1 780.79   7. Frailty syndrome in geriatric patient  R54 797   8. Hypothyroidism (acquired)  E03.9 244.9   9. Malignant neoplasm of lower lobe, right bronchus or lung  C34.31 162.5   10. Peripheral vascular disease of lower extremity  I73.9 443.9   11. Aortic stenosis, moderate  I35.0 424.1   12. Acute cystitis without hematuria  N30.00 595.0   13. Diarrhea, unspecified type  R19.7 787.91       ASSESSMENT/PLAN    Diagnoses and all orders for this visit:    1. Metabolic encephalopathy (Primary)    2. Chronic heart failure with preserved ejection fraction    3. Renal artery stenosis    4. Type 2 diabetes mellitus with diabetic peripheral angiopathy without gangrene, without long-term current use of insulin    5. End stage renal disease on dialysis    6. Weakness    7. Frailty syndrome in geriatric patient    8. Hypothyroidism (acquired)    9. Malignant neoplasm of lower lobe, right bronchus or lung    10. Peripheral vascular disease of lower extremity    11. Aortic stenosis, moderate    12. Acute cystitis without hematuria    13. Diarrhea, unspecified type         UTI with altered mental status, mental status fluctuated several times in the hospital, she was felt to  have gotten too much pain medication had to receive Narcan at 1 point    Diarrhea recently with low blood pressures, has been in and out of the hospital several times over the past couple months tilt test negative stool culture negative,    Metabolic encephalopathy, off narcotics, seems to be doing okay currently    Altered mental status during the hospital stay stroke RRT called, CTA showed a right internal carotid artery stenosis 60% due to plaque, MRI of the brain came back negative for acute stroke and neurology signed off,    Hematemesis anemia, status post blood transfusions on previous admission    End-stage renal disease on dialysis    Hypertension, continues carvedilol, losartan 25 mg daily    Congestive heart failure, continues carvedilol 6.25 mg twice a day    Lung cancer, previously on chemotherapy, previous left upper lobectomy previous CT of the head negative for acute abnormalities    Recent left hip fracture after fall, ORIF June 11, 2025, this was a periprosthetic femoral fracture treated at [facility]    COPD, continues budesonide 0.5 mg nebulizers twice a day, chronic inhaled tobramycin    Anemia, continues iron sulfate 325 mg daily,    Aortic stenosis moderate to severe by clinical criteria    Renal artery stenosis status post stenting in the past    PAD, continues Plavix 75 mg daily    Generalized weakness    Hypothyroidism continues levothyroxine 50 mcg daily    Moderate protein calorie malnutrition, still not eating well, blood sugars fluctuating    Diabetes type 2    Plan is to continue monitoring her mental status, try to rehab some, nutritional support and dialysis will continue, will need to follow labs through dialysis,  Brennan Mosqueda MD       55 minutes were spent caring for Charlette on this date of service. This time spent by me includes preparing for the visit, reviewing tests, obtaining/reviewing separately obtained history, performing medically appropriate  exam/evaluation, counseling/educating the patient/family/caregiver, ordering medications/tests/procedures, documenting information in the medical record, independently interpreting results and communicating that with the patient/family/caregiver and/or care coordination.

## 2025-07-22 ENCOUNTER — TRANSCRIBE ORDERS (OUTPATIENT)
Dept: ADMINISTRATIVE | Facility: HOSPITAL | Age: 89
End: 2025-07-22
Payer: MEDICARE

## 2025-07-22 DIAGNOSIS — R53.83 FATIGUE, UNSPECIFIED TYPE: Primary | ICD-10-CM

## 2025-07-22 DIAGNOSIS — D64.9 ANEMIA, UNSPECIFIED TYPE: ICD-10-CM

## 2025-07-22 LAB
QT INTERVAL: 429 MS
QTC INTERVAL: 499 MS

## 2025-07-23 ENCOUNTER — HOSPITAL ENCOUNTER (OUTPATIENT)
Dept: INFUSION THERAPY | Facility: HOSPITAL | Age: 89
Discharge: HOME | End: 2025-07-23
Admitting: NURSE PRACTITIONER
Payer: MEDICARE

## 2025-07-23 ENCOUNTER — HOSPITAL ENCOUNTER (INPATIENT)
Facility: HOSPITAL | Age: 89
LOS: 3 days | Discharge: NURSING, REHAB OR LONG-TERM CARE | DRG: 205 | End: 2025-07-28
Attending: EMERGENCY MEDICINE | Admitting: INTERNAL MEDICINE
Payer: MEDICARE

## 2025-07-23 ENCOUNTER — APPOINTMENT (OUTPATIENT)
Dept: GENERAL RADIOLOGY | Facility: HOSPITAL | Age: 89
DRG: 205 | End: 2025-07-23
Payer: MEDICARE

## 2025-07-23 VITALS
DIASTOLIC BLOOD PRESSURE: 72 MMHG | SYSTOLIC BLOOD PRESSURE: 212 MMHG | OXYGEN SATURATION: 99 % | RESPIRATION RATE: 16 BRPM | HEART RATE: 94 BPM | TEMPERATURE: 98.2 F

## 2025-07-23 DIAGNOSIS — N18.6 END STAGE RENAL DISEASE ON DIALYSIS: ICD-10-CM

## 2025-07-23 DIAGNOSIS — Z78.9 DECREASED ACTIVITIES OF DAILY LIVING (ADL): ICD-10-CM

## 2025-07-23 DIAGNOSIS — J96.00 ACUTE RESPIRATORY FAILURE, UNSPECIFIED WHETHER WITH HYPOXIA OR HYPERCAPNIA: Primary | ICD-10-CM

## 2025-07-23 DIAGNOSIS — E78.5 HYPERLIPIDEMIA LDL GOAL <70: ICD-10-CM

## 2025-07-23 DIAGNOSIS — R13.12 OROPHARYNGEAL DYSPHAGIA: ICD-10-CM

## 2025-07-23 DIAGNOSIS — R26.2 DIFFICULTY WALKING: ICD-10-CM

## 2025-07-23 DIAGNOSIS — Z99.2 END STAGE RENAL DISEASE ON DIALYSIS: ICD-10-CM

## 2025-07-23 LAB
ABO GROUP BLD: NORMAL
ALBUMIN SERPL-MCNC: 2.9 G/DL (ref 3.5–5.2)
ALBUMIN/GLOB SERPL: 0.9 G/DL
ALP SERPL-CCNC: 335 U/L (ref 39–117)
ALT SERPL W P-5'-P-CCNC: 90 U/L (ref 1–33)
ANION GAP SERPL CALCULATED.3IONS-SCNC: 10.7 MMOL/L (ref 5–15)
ANISOCYTOSIS BLD QL: NORMAL
AST SERPL-CCNC: 69 U/L (ref 1–32)
BASO STIPL COARSE BLD QL SMEAR: NORMAL
BASOPHILS # BLD AUTO: 0.04 10*3/MM3 (ref 0–0.2)
BASOPHILS NFR BLD AUTO: 0.7 % (ref 0–1.5)
BILIRUB SERPL-MCNC: 0.5 MG/DL (ref 0–1.2)
BLD GP AB SCN SERPL QL: NEGATIVE
BUN SERPL-MCNC: 25.1 MG/DL (ref 8–23)
BUN/CREAT SERPL: 9.6 (ref 7–25)
BURR CELLS BLD QL SMEAR: NORMAL
CALCIUM SPEC-SCNC: 8.1 MG/DL (ref 8.6–10.5)
CHLORIDE SERPL-SCNC: 107 MMOL/L (ref 98–107)
CO2 SERPL-SCNC: 25.3 MMOL/L (ref 22–29)
CREAT SERPL-MCNC: 2.61 MG/DL (ref 0.57–1)
DEPRECATED RDW RBC AUTO: 79.6 FL (ref 37–54)
EGFRCR SERPLBLD CKD-EPI 2021: 17.2 ML/MIN/1.73
EOSINOPHIL # BLD AUTO: 0.13 10*3/MM3 (ref 0–0.4)
EOSINOPHIL NFR BLD AUTO: 2.2 % (ref 0.3–6.2)
ERYTHROCYTE [DISTWIDTH] IN BLOOD BY AUTOMATED COUNT: 22 % (ref 12.3–15.4)
GLOBULIN UR ELPH-MCNC: 3.2 GM/DL
GLUCOSE SERPL-MCNC: 129 MG/DL (ref 65–99)
HCT VFR BLD AUTO: 27.4 % (ref 34–46.6)
HCT VFR BLD AUTO: 39.3 % (ref 34–46.6)
HGB BLD-MCNC: 11.9 G/DL (ref 12–15.9)
HGB BLD-MCNC: 7.9 G/DL (ref 12–15.9)
HOLD SPECIMEN: NORMAL
HOLD SPECIMEN: NORMAL
HYPOCHROMIA BLD QL: NORMAL
IMM GRANULOCYTES # BLD AUTO: 0.03 10*3/MM3 (ref 0–0.05)
IMM GRANULOCYTES NFR BLD AUTO: 0.5 % (ref 0–0.5)
LIPASE SERPL-CCNC: 101 U/L (ref 13–60)
LYMPHOCYTES # BLD AUTO: 0.39 10*3/MM3 (ref 0.7–3.1)
LYMPHOCYTES NFR BLD AUTO: 6.6 % (ref 19.6–45.3)
MACROCYTES BLD QL SMEAR: NORMAL
MAGNESIUM SERPL-MCNC: 2.4 MG/DL (ref 1.6–2.4)
MCH RBC QN AUTO: 31.9 PG (ref 26.6–33)
MCHC RBC AUTO-ENTMCNC: 30.3 G/DL (ref 31.5–35.7)
MCV RBC AUTO: 105.4 FL (ref 79–97)
MONOCYTES # BLD AUTO: 0.6 10*3/MM3 (ref 0.1–0.9)
MONOCYTES NFR BLD AUTO: 10.2 % (ref 5–12)
NEUTROPHILS NFR BLD AUTO: 4.71 10*3/MM3 (ref 1.7–7)
NEUTROPHILS NFR BLD AUTO: 79.8 % (ref 42.7–76)
NRBC BLD AUTO-RTO: 0 /100 WBC (ref 0–0.2)
NT-PROBNP SERPL-MCNC: ABNORMAL PG/ML (ref 0–1800)
PLATELET # BLD AUTO: 160 10*3/MM3 (ref 140–450)
PMV BLD AUTO: 9.4 FL (ref 6–12)
POTASSIUM SERPL-SCNC: 4.5 MMOL/L (ref 3.5–5.2)
PROT SERPL-MCNC: 6.1 G/DL (ref 6–8.5)
QT INTERVAL: 390 MS
QTC INTERVAL: 491 MS
RBC # BLD AUTO: 3.73 10*6/MM3 (ref 3.77–5.28)
RH BLD: POSITIVE
SMALL PLATELETS BLD QL SMEAR: NORMAL
SODIUM SERPL-SCNC: 143 MMOL/L (ref 136–145)
T&S EXPIRATION DATE: NORMAL
TROPONIN T SERPL HS-MCNC: 201 NG/L
WBC MORPH BLD: NORMAL
WBC NRBC COR # BLD AUTO: 5.9 10*3/MM3 (ref 3.4–10.8)
WHOLE BLOOD HOLD COAG: NORMAL
WHOLE BLOOD HOLD SPECIMEN: NORMAL

## 2025-07-23 PROCEDURE — 84484 ASSAY OF TROPONIN QUANT: CPT | Performed by: EMERGENCY MEDICINE

## 2025-07-23 PROCEDURE — 94664 DEMO&/EVAL PT USE INHALER: CPT

## 2025-07-23 PROCEDURE — 85018 HEMOGLOBIN: CPT | Performed by: NURSE PRACTITIONER

## 2025-07-23 PROCEDURE — 85007 BL SMEAR W/DIFF WBC COUNT: CPT | Performed by: EMERGENCY MEDICINE

## 2025-07-23 PROCEDURE — 83690 ASSAY OF LIPASE: CPT | Performed by: EMERGENCY MEDICINE

## 2025-07-23 PROCEDURE — 94799 UNLISTED PULMONARY SVC/PX: CPT

## 2025-07-23 PROCEDURE — G0378 HOSPITAL OBSERVATION PER HR: HCPCS

## 2025-07-23 PROCEDURE — 83735 ASSAY OF MAGNESIUM: CPT | Performed by: EMERGENCY MEDICINE

## 2025-07-23 PROCEDURE — 83880 ASSAY OF NATRIURETIC PEPTIDE: CPT | Performed by: EMERGENCY MEDICINE

## 2025-07-23 PROCEDURE — 86923 COMPATIBILITY TEST ELECTRIC: CPT

## 2025-07-23 PROCEDURE — 86705 HEP B CORE ANTIBODY IGM: CPT | Performed by: INTERNAL MEDICINE

## 2025-07-23 PROCEDURE — 99291 CRITICAL CARE FIRST HOUR: CPT

## 2025-07-23 PROCEDURE — 71045 X-RAY EXAM CHEST 1 VIEW: CPT

## 2025-07-23 PROCEDURE — 80053 COMPREHEN METABOLIC PANEL: CPT | Performed by: EMERGENCY MEDICINE

## 2025-07-23 PROCEDURE — 86900 BLOOD TYPING SEROLOGIC ABO: CPT | Performed by: NURSE PRACTITIONER

## 2025-07-23 PROCEDURE — 25010000002 METHYLPREDNISOLONE PER 125 MG: Performed by: EMERGENCY MEDICINE

## 2025-07-23 PROCEDURE — 86706 HEP B SURFACE ANTIBODY: CPT | Performed by: INTERNAL MEDICINE

## 2025-07-23 PROCEDURE — 86900 BLOOD TYPING SEROLOGIC ABO: CPT

## 2025-07-23 PROCEDURE — 93005 ELECTROCARDIOGRAM TRACING: CPT | Performed by: EMERGENCY MEDICINE

## 2025-07-23 PROCEDURE — 87340 HEPATITIS B SURFACE AG IA: CPT | Performed by: INTERNAL MEDICINE

## 2025-07-23 PROCEDURE — 85025 COMPLETE CBC W/AUTO DIFF WBC: CPT | Performed by: EMERGENCY MEDICINE

## 2025-07-23 PROCEDURE — 86850 RBC ANTIBODY SCREEN: CPT | Performed by: NURSE PRACTITIONER

## 2025-07-23 PROCEDURE — 25010000002 HYDRALAZINE PER 20 MG: Performed by: HOSPITALIST

## 2025-07-23 PROCEDURE — 85014 HEMATOCRIT: CPT | Performed by: NURSE PRACTITIONER

## 2025-07-23 PROCEDURE — 36430 TRANSFUSION BLD/BLD COMPNT: CPT

## 2025-07-23 PROCEDURE — P9016 RBC LEUKOCYTES REDUCED: HCPCS

## 2025-07-23 PROCEDURE — 25010000002 FUROSEMIDE PER 20 MG: Performed by: EMERGENCY MEDICINE

## 2025-07-23 PROCEDURE — 25010000002 HYDROMORPHONE 1 MG/ML SOLUTION: Performed by: FAMILY MEDICINE

## 2025-07-23 PROCEDURE — 36415 COLL VENOUS BLD VENIPUNCTURE: CPT | Performed by: EMERGENCY MEDICINE

## 2025-07-23 PROCEDURE — 86901 BLOOD TYPING SEROLOGIC RH(D): CPT | Performed by: NURSE PRACTITIONER

## 2025-07-23 PROCEDURE — 25010000002 METHYLPREDNISOLONE PER 40 MG: Performed by: HOSPITALIST

## 2025-07-23 RX ORDER — ASPIRIN 81 MG/1
324 TABLET, CHEWABLE ORAL ONCE
Status: DISCONTINUED | OUTPATIENT
Start: 2025-07-23 | End: 2025-07-23

## 2025-07-23 RX ORDER — FERROUS SULFATE 325(65) MG
325 TABLET ORAL
Status: DISCONTINUED | OUTPATIENT
Start: 2025-07-24 | End: 2025-07-28 | Stop reason: HOSPADM

## 2025-07-23 RX ORDER — ONDANSETRON 4 MG/1
4 TABLET, ORALLY DISINTEGRATING ORAL EVERY 6 HOURS PRN
Status: DISCONTINUED | OUTPATIENT
Start: 2025-07-23 | End: 2025-07-28 | Stop reason: HOSPADM

## 2025-07-23 RX ORDER — SODIUM CHLORIDE 0.9 % (FLUSH) 0.9 %
10 SYRINGE (ML) INJECTION AS NEEDED
Status: DISCONTINUED | OUTPATIENT
Start: 2025-07-23 | End: 2025-07-28 | Stop reason: HOSPADM

## 2025-07-23 RX ORDER — AMOXICILLIN 250 MG
2 CAPSULE ORAL 2 TIMES DAILY PRN
Status: DISCONTINUED | OUTPATIENT
Start: 2025-07-23 | End: 2025-07-28 | Stop reason: HOSPADM

## 2025-07-23 RX ORDER — IPRATROPIUM BROMIDE AND ALBUTEROL SULFATE 2.5; .5 MG/3ML; MG/3ML
3 SOLUTION RESPIRATORY (INHALATION) EVERY 4 HOURS PRN
Status: DISCONTINUED | OUTPATIENT
Start: 2025-07-23 | End: 2025-07-28 | Stop reason: HOSPADM

## 2025-07-23 RX ORDER — TROLAMINE SALICYLATE 10 G/100G
1 CREAM TOPICAL EVERY 8 HOURS SCHEDULED
Status: DISCONTINUED | OUTPATIENT
Start: 2025-07-23 | End: 2025-07-28 | Stop reason: HOSPADM

## 2025-07-23 RX ORDER — METHYLPREDNISOLONE SODIUM SUCCINATE 40 MG/ML
40 INJECTION, POWDER, LYOPHILIZED, FOR SOLUTION INTRAMUSCULAR; INTRAVENOUS EVERY 12 HOURS
Status: DISPENSED | OUTPATIENT
Start: 2025-07-23 | End: 2025-07-27

## 2025-07-23 RX ORDER — ARFORMOTEROL TARTRATE 15 UG/2ML
15 SOLUTION RESPIRATORY (INHALATION)
Status: DISCONTINUED | OUTPATIENT
Start: 2025-07-24 | End: 2025-07-28 | Stop reason: HOSPADM

## 2025-07-23 RX ORDER — PANTOPRAZOLE SODIUM 40 MG/1
40 TABLET, DELAYED RELEASE ORAL
Status: DISCONTINUED | OUTPATIENT
Start: 2025-07-24 | End: 2025-07-28 | Stop reason: HOSPADM

## 2025-07-23 RX ORDER — ISOSORBIDE MONONITRATE 30 MG/1
30 TABLET, EXTENDED RELEASE ORAL NIGHTLY
Status: DISCONTINUED | OUTPATIENT
Start: 2025-07-24 | End: 2025-07-28 | Stop reason: HOSPADM

## 2025-07-23 RX ORDER — CARVEDILOL 6.25 MG/1
6.25 TABLET ORAL 2 TIMES DAILY WITH MEALS
Status: DISCONTINUED | OUTPATIENT
Start: 2025-07-24 | End: 2025-07-28 | Stop reason: HOSPADM

## 2025-07-23 RX ORDER — ACETAMINOPHEN 10 MG/ML
1000 INJECTION, SOLUTION INTRAVENOUS ONCE
Status: COMPLETED | OUTPATIENT
Start: 2025-07-23 | End: 2025-07-24

## 2025-07-23 RX ORDER — POLYETHYLENE GLYCOL 3350 17 G/17G
17 POWDER, FOR SOLUTION ORAL DAILY PRN
Status: DISCONTINUED | OUTPATIENT
Start: 2025-07-23 | End: 2025-07-28 | Stop reason: HOSPADM

## 2025-07-23 RX ORDER — ACETAMINOPHEN 325 MG/1
650 TABLET ORAL EVERY 6 HOURS PRN
Status: DISCONTINUED | OUTPATIENT
Start: 2025-07-23 | End: 2025-07-28 | Stop reason: HOSPADM

## 2025-07-23 RX ORDER — CAPSAICIN 0.07 G/100G
1 CREAM TOPICAL EVERY 8 HOURS SCHEDULED
Status: DISCONTINUED | OUTPATIENT
Start: 2025-07-23 | End: 2025-07-28 | Stop reason: HOSPADM

## 2025-07-23 RX ORDER — HYDRALAZINE HYDROCHLORIDE 20 MG/ML
10 INJECTION INTRAMUSCULAR; INTRAVENOUS ONCE
Status: COMPLETED | OUTPATIENT
Start: 2025-07-23 | End: 2025-07-23

## 2025-07-23 RX ORDER — ASPIRIN 81 MG/1
81 TABLET ORAL DAILY
Status: DISCONTINUED | OUTPATIENT
Start: 2025-07-24 | End: 2025-07-28 | Stop reason: HOSPADM

## 2025-07-23 RX ORDER — TOBRAMYCIN INHALATION SOLUTION 300 MG/5ML
300 INHALANT RESPIRATORY (INHALATION)
Status: DISCONTINUED | OUTPATIENT
Start: 2025-07-23 | End: 2025-07-28 | Stop reason: HOSPADM

## 2025-07-23 RX ORDER — SODIUM CHLORIDE 0.9 % (FLUSH) 0.9 %
10 SYRINGE (ML) INJECTION AS NEEDED
Status: DISCONTINUED | OUTPATIENT
Start: 2025-07-23 | End: 2025-07-24

## 2025-07-23 RX ORDER — FLUTICASONE PROPIONATE 50 MCG
1 SPRAY, SUSPENSION (ML) NASAL DAILY
Status: DISCONTINUED | OUTPATIENT
Start: 2025-07-24 | End: 2025-07-28 | Stop reason: HOSPADM

## 2025-07-23 RX ORDER — SODIUM CHLORIDE 0.9 % (FLUSH) 0.9 %
10 SYRINGE (ML) INJECTION EVERY 12 HOURS SCHEDULED
Status: DISCONTINUED | OUTPATIENT
Start: 2025-07-24 | End: 2025-07-28 | Stop reason: HOSPADM

## 2025-07-23 RX ORDER — FUROSEMIDE 10 MG/ML
60 INJECTION INTRAMUSCULAR; INTRAVENOUS ONCE
Status: COMPLETED | OUTPATIENT
Start: 2025-07-23 | End: 2025-07-23

## 2025-07-23 RX ORDER — ROSUVASTATIN CALCIUM 5 MG/1
10 TABLET, COATED ORAL NIGHTLY
Status: DISCONTINUED | OUTPATIENT
Start: 2025-07-24 | End: 2025-07-24

## 2025-07-23 RX ORDER — SODIUM CHLORIDE 9 MG/ML
40 INJECTION, SOLUTION INTRAVENOUS AS NEEDED
Status: DISCONTINUED | OUTPATIENT
Start: 2025-07-23 | End: 2025-07-28 | Stop reason: HOSPADM

## 2025-07-23 RX ORDER — LOSARTAN POTASSIUM 25 MG/1
25 TABLET ORAL
Status: DISCONTINUED | OUTPATIENT
Start: 2025-07-24 | End: 2025-07-25

## 2025-07-23 RX ORDER — ONDANSETRON 2 MG/ML
4 INJECTION INTRAMUSCULAR; INTRAVENOUS EVERY 6 HOURS PRN
Status: DISCONTINUED | OUTPATIENT
Start: 2025-07-23 | End: 2025-07-28 | Stop reason: HOSPADM

## 2025-07-23 RX ORDER — BUDESONIDE 0.5 MG/2ML
0.5 INHALANT ORAL
Status: DISCONTINUED | OUTPATIENT
Start: 2025-07-24 | End: 2025-07-28 | Stop reason: HOSPADM

## 2025-07-23 RX ORDER — CLOPIDOGREL BISULFATE 75 MG/1
75 TABLET ORAL DAILY
Status: DISCONTINUED | OUTPATIENT
Start: 2025-07-24 | End: 2025-07-28 | Stop reason: HOSPADM

## 2025-07-23 RX ORDER — BISACODYL 10 MG
10 SUPPOSITORY, RECTAL RECTAL DAILY PRN
Status: DISCONTINUED | OUTPATIENT
Start: 2025-07-23 | End: 2025-07-28 | Stop reason: HOSPADM

## 2025-07-23 RX ORDER — LEVOTHYROXINE SODIUM 50 UG/1
50 TABLET ORAL
Status: DISCONTINUED | OUTPATIENT
Start: 2025-07-24 | End: 2025-07-28 | Stop reason: HOSPADM

## 2025-07-23 RX ORDER — BISACODYL 5 MG/1
5 TABLET, DELAYED RELEASE ORAL DAILY PRN
Status: DISCONTINUED | OUTPATIENT
Start: 2025-07-23 | End: 2025-07-28 | Stop reason: HOSPADM

## 2025-07-23 RX ORDER — ALBUTEROL SULFATE 0.83 MG/ML
7.5 SOLUTION RESPIRATORY (INHALATION)
Status: COMPLETED | OUTPATIENT
Start: 2025-07-23 | End: 2025-07-23

## 2025-07-23 RX ADMIN — METHYLPREDNISOLONE SODIUM SUCCINATE 40 MG: 40 INJECTION, POWDER, FOR SOLUTION INTRAMUSCULAR; INTRAVENOUS at 23:09

## 2025-07-23 RX ADMIN — ALBUTEROL SULFATE 7.5 MG: 2.5 SOLUTION RESPIRATORY (INHALATION) at 18:47

## 2025-07-23 RX ADMIN — HYDROMORPHONE HYDROCHLORIDE 0.5 MG: 1 INJECTION, SOLUTION INTRAMUSCULAR; INTRAVENOUS; SUBCUTANEOUS at 23:51

## 2025-07-23 RX ADMIN — FUROSEMIDE 60 MG: 10 INJECTION, SOLUTION INTRAMUSCULAR; INTRAVENOUS at 21:08

## 2025-07-23 RX ADMIN — HYDRALAZINE HYDROCHLORIDE 10 MG: 20 INJECTION INTRAMUSCULAR; INTRAVENOUS at 23:09

## 2025-07-23 RX ADMIN — WATER 125 MG: 1 INJECTION INTRAMUSCULAR; INTRAVENOUS; SUBCUTANEOUS at 20:56

## 2025-07-23 NOTE — ED PROVIDER NOTES
[facility]   Patient Instructions Following Colposcopy and Cervical Biopsy       You have had a small biopsy taken from your cervix. Following this procedure, you may have a small amount of bloody, dark or brown discharge for a few days. If you experience cramping, you may take Ibuprofen, Tylenol or another pain reliever of your choice as needed.     Please do not put anything into your vagina (do not douche, use tampons, or have intercourse) for three days following the procedure, unless otherwise instructed. Doing so may disturb the biopy site and cause heavy bleeding.     If you should have heavy, bright red bleeding (so that you have to change pads several times a day) that is not your period, please call. We may decide that an evaluation in the office is needed.     The report of the tissue evaluation will come to me in about a week. I make every effort to call promptly to let you know the biopsy report. If you do not receive a call from my office within two weeks' time, please call me.     Time: 6:28 PM EDT  Date of encounter:  7/23/2025  Independent Historian/Clinical History and Information was obtained by:   Patient    History is limited by: N/A    Chief Complaint: Dyspnea      History of Present Illness:  Patient is a 89 y.o. year old female who presents to the emergency department for evaluation of dyspnea that occurred when the patient was getting a blood transfusion.  Patient has not had a breathing treatment since 430 this morning.  Patient denies cough and hemoptysis.  Patient denies nausea, vomiting, and diarrhea.  Patient has no leg pain but has had some swelling.      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 DENIES CP BUT DOES HAVE SOA CHRONIC ISSUE COPD/LUNG CANCER    COPD (chronic obstructive pulmonary disease)     FOLLOWED BY DR MARIAJOSE BAILEY    Coronary artery disease     DENIES CP BUT DOES GET SOA MOST OF THE TIME WITH EXERTION BUT OCC AT REST CHRONIC ISSUE COPD/LUNG CANCER    Deep vein thrombosis     Diabetes mellitus     DOES NOT CHECK BS DAILY    Disease of thyroid gland     HYPOTHYROIDISM    Essential hypertension     Gastric ulcer     GERD (gastroesophageal reflux disease)     Heart murmur     History of transfusion     NO ISSUES POST TRANSFUSION WAS MANY YEARS AGO    Hyperlipemia     Leukocytopenia      FOLLOWED BY DR MIR BAILEY    Limb swelling     Lumbago     Lumbar spinal stenosis     Lung cancer     Migraine headache     Multiple joint pain     On home oxygen therapy     4L/NC PRN    Osteopenia     PNA (pneumonia) 05/04/2024    Pseudomonas pneumonia 04/29/2024    Reflux esophagitis     Shortness of breath     Thyroid nodule     HAS ULTRASOUND YEARLY BEING MONITORED    Vascular disease     Vitamin D deficiency      Past Surgical History:   Procedure Laterality Date    ABDOMINAL SURGERY      ANGIOPLASTY RENAL ARTERY Left 06/14/2024    stent placement    APPENDECTOMY      ARTERIOGRAM N/A 06/14/2024    Procedure: Arteriogram, right radial access, aortogram and renal arteriogram with possible intervention.;  Surgeon: Dio Miguel MD;  Location: [facility] CATH INVASIVE LOCATION;  Service: Peripheral Vascular;  Laterality: N/A;    ARTERIOVENOUS FISTULA/SHUNT SURGERY Left 05/10/2022    Procedure: Left basilic vein transposition;  Surgeon: Wan Barksdale MD;  Location: [facility] MAIN OR;  Service: Vascular;  Laterality: Left;    ARTERIOVENOUS FISTULA/SHUNT SURGERY Left 03/23/2023    Procedure: Ligation of left arm arteriovenous fistula;  Surgeon: Wan Barksdale MD;  Location: [facility] MAIN OR;  Service: Vascular;  Laterality: Left;    ARTERIOVENOUS FISTULA/SHUNT SURGERY Left 11/30/2023    Procedure: Creation of left arm arteriovenous graft;  Surgeon: Wan Barksdale MD;  Location: [facility] MAIN OR;  Service: Vascular;  Laterality: Left;    BRONCHOSCOPY N/A 04/24/2024    Procedure: BRONCHOSCOPY WITH BAL AND WASHINGS;  Surgeon: Khalif Yanez MD;  Location: [facility] ENDOSCOPY;  Service: Pulmonary;  Laterality: N/A;  MUCUS PLUGGING    CARDIAC CATHETERIZATION  1996    CARDIAC SURGERY      CARDIAC SURGERY      fluid drained from heart    CATARACT EXTRACTION, BILATERAL  2003    COLONOSCOPY  2014    ENDOSCOPY  2016    2019    ENDOSCOPY N/A 06/25/2025    Procedure: ESOPHAGOGASTRODUODENOSCOPY;  Surgeon: Tanvi Del Valle MD;   Location: [facility] ENDOSCOPY;  Service: Gastroenterology;  Laterality: N/A;  gastritis, esophagitis    FEMORAL ARTERY STENT Bilateral     HYSTERECTOMY      LEG THROMBECTOMY/EMBOLECTOMY Left 01/29/2025    Procedure: LEFT LOWER EXTREMITY ANGIOGRAM;  Surgeon: Jaden Castañeda MD;  Location: [facility] HYBRID OR;  Service: Vascular;  Laterality: Left;    LUNG BIOPSY Left 2005    lobectomy upper lung caner    LUNG VOLUME REDUCTION      OTHER SURGICAL HISTORY      artifical joints/limbs    REPLACEMENT TOTAL KNEE Left 2016    SHUNT O GRAM N/A 01/13/2025    Procedure: dialysis shuntogram;  Surgeon: Court Valente MD;  Location: [facility] CATH INVASIVE LOCATION;  Service: Peripheral Vascular;  Laterality: N/A;    UPPER GASTROINTESTINAL ENDOSCOPY       Family History   Problem Relation Age of Onset    Arthritis Mother     Arthritis Father     Cancer Brother     Diabetes Brother     Other Brother         blood disease     Prostate cancer Brother     Cancer Brother     Prostate cancer Brother     Cancer Brother     Cancer Brother     Malig Hyperthermia Neg Hx     Colon cancer Neg Hx        Home Medications:  Prior to Admission medications    Medication Sig Start Date End Date Taking? Authorizing Provider   acetaminophen (TYLENOL) 325 MG tablet Take 2 tablets by mouth Every 6 (Six) Hours As Needed for Mild Pain.    Provider, MD Sarah   albuterol sulfate  (90 Base) MCG/ACT inhaler Inhale 2 puffs Every 4 (Four) Hours As Needed for Wheezing. 4/22/25   Khalif Yanez MD   aspirin 81 MG EC tablet Take 1 tablet by mouth Daily.    Provider, MD Sarah   budesonide (Pulmicort) 0.5 MG/2ML nebulizer solution Take 2 mL by nebulization 2 (Two) Times a Day. 2/4/25   Vandana Hannah APRN   carvedilol (COREG) 6.25 MG tablet Take 1 tablet by mouth 2 (Two) Times a Day With Meals. 7/3/25   Patrick Turcios MD   clopidogrel (Plavix) 75 MG tablet Take 1 tablet by mouth Daily. 5/23/25 5/23/26  Anali Sears APRN   dexlansoprazole (DEXILANT)  60 MG capsule Take 1 capsule by mouth Daily. 8/5/24   Brennan Mosqueda MD   ferrous gluconate (FERGON) 324 MG tablet Take 1 tablet by mouth 3 (Three) Times a Week. 3/26/25   Nicole Stern APRN   fluticasone (FLONASE) 50 MCG/ACT nasal spray Administer 1 spray into the nostril(s) as directed by provider Daily. 9/12/24   Sarah Parker MD   furosemide (LASIX) 20 MG tablet Take 1 tablet by mouth Daily. 6/9/25   Paloma Rodas APRN   ipratropium-albuterol (DUO-NEB) 0.5-2.5 mg/3 ml nebulizer Take 3 mL by nebulization Every 6 (Six) Hours As Needed for Wheezing. 2/4/25   Vandana Hannah APRN   isosorbide mononitrate (IMDUR) 30 MG 24 hr tablet Take 1 tablet by mouth Every Night. 9/17/24   Jazzy Addison APRN   levothyroxine (SYNTHROID, LEVOTHROID) 50 MCG tablet Take 1 tablet by mouth Daily. 7/8/24   Sarah Parker MD   lidocaine-prilocaine (EMLA) 2.5-2.5 % cream Apply 1 Application topically to the appropriate area as directed Take As Directed. Apply to dialysis access site 30-45 minutes prior to dialysis 9/13/24   Sarah Parker MD   losartan (COZAAR) 25 MG tablet Take 1 tablet by mouth Daily. 7/4/25   Patrick Turcios MD   Methoxy PEG-Epoetin Beta (MIRCERA IJ) 200 mcg Every 14 (Fourteen) Days. 4/21/25 4/20/26  Sarah Parker MD   rosuvastatin (CRESTOR) 20 MG tablet Take 0.5 tablets by mouth Every Night. 7/3/25   Patrick Turcios MD   tobramycin PF (JUAN) 300 MG/5ML nebulizer solution Take 5 mL by nebulization 2 (Two) Times a Day. 30 days on 30 days off 9/12/24   Laura Franco APRN   vitamin D (ERGOCALCIFEROL) 1.25 MG (19943 UT) capsule capsule Take 1 capsule by mouth Every 14 (Fourteen) Days.  Patient taking differently: Take 1 capsule by mouth Every 14 (Fourteen) Days. The 15th and 30th of the month 10/24/24   Brennan Mosqueda MD        Social History:   Social History     Tobacco Use    Smoking status: Former     Current packs/day: 0.00     Average packs/day:  "1 pack/day for 52.5 years (52.5 ttl pk-yrs)     Types: Cigarettes     Start date:      Quit date: 2004     Years since quittin.0     Passive exposure: Past    Smokeless tobacco: Never   Vaping Use    Vaping status: Never Used   Substance Use Topics    Alcohol use: Not Currently     Comment: beer many years ago    Drug use: Never         Review of Systems:  Review of Systems   Constitutional:  Negative for chills and fever.   HENT:  Negative for congestion, rhinorrhea and sore throat.    Eyes:  Negative for pain and visual disturbance.   Respiratory:  Positive for shortness of breath. Negative for apnea, cough and chest tightness.    Cardiovascular:  Negative for chest pain and palpitations.   Gastrointestinal:  Negative for abdominal pain, diarrhea, nausea and vomiting.   Genitourinary:  Negative for difficulty urinating and dysuria.   Musculoskeletal:  Negative for joint swelling and myalgias.   Skin:  Negative for color change.   Neurological:  Negative for seizures and headaches.   Psychiatric/Behavioral: Negative.     All other systems reviewed and are negative.       Physical Exam:  /61   Pulse 89   Temp 97.5 °F (36.4 °C) (Oral)   Resp 12   Ht 165.1 cm (65\")   Wt 73.5 kg (162 lb)   LMP  (LMP Unknown)   SpO2 100%   BMI 26.96 kg/m²     Physical Exam  Vitals and nursing note reviewed.   Constitutional:       General: She is not in acute distress.     Appearance: Normal appearance. She is not toxic-appearing.   HENT:      Head: Normocephalic and atraumatic.      Jaw: There is normal jaw occlusion.   Eyes:      General: Lids are normal.      Extraocular Movements: Extraocular movements intact.      Conjunctiva/sclera: Conjunctivae normal.      Pupils: Pupils are equal, round, and reactive to light.   Cardiovascular:      Rate and Rhythm: Normal rate and regular rhythm.      Pulses: Normal pulses.      Heart sounds: Normal heart sounds.   Pulmonary:      Effort: Pulmonary effort is normal. " No respiratory distress.      Breath sounds: Wheezing present. No rhonchi.   Abdominal:      General: Abdomen is flat.      Palpations: Abdomen is soft.      Tenderness: There is no abdominal tenderness. There is no guarding or rebound.   Musculoskeletal:         General: Normal range of motion.      Cervical back: Normal range of motion and neck supple.      Right lower leg: No edema.      Left lower leg: No edema.   Skin:     General: Skin is warm and dry.   Neurological:      Mental Status: She is alert and oriented to person, place, and time. Mental status is at baseline.   Psychiatric:         Mood and Affect: Mood normal.                    Medical Decision Making:      Comorbidities that affect care:    COPD    External Notes reviewed:    Hospital Discharge Summary: Patient was discharged from the hospital 5 days ago for UTI and altered mental status.      The following orders were placed and all results were independently analyzed by me:  Orders Placed This Encounter   Procedures    XR Chest 1 View    [place] Draw    High Sensitivity Troponin T    Comprehensive Metabolic Panel    Lipase    BNP    Magnesium    CBC Auto Differential    Scan Slide    High Sensitivity Troponin T 1Hr    Diet: Renal; Low Sodium (2-3g), Low Potassium, Low Phosphorus; Texture: Regular (IDDSI 7); Fluid Consistency: Thin (IDDSI 0)    Undress & Gown    Continuous Pulse Oximetry    Code Status and Medical Interventions: No CPR (Do Not Attempt to Resuscitate); Limited Support; No intubation (DNI)    Inpatient Hospitalist Consult    Inpatient Nephrology Consult    Inpatient Nephrology Consult    Dietary Nutrition Supplements Novasource Renal (Nepro)    Oxygen Therapy- Nasal Cannula; Titrate 1-6 LPM Per SpO2; 90 - 95%    ECG 12 Lead ED Triage Standing Order; Chest Pain    ECG 12 Lead ED Triage Standing Order; Chest Pain    Insert Peripheral IV    Initiate Observation Status    CBC & Differential    Green Top (Gel)    Lavender Top    Gold  Top - SST    Light Blue Top       Medications Given in the Emergency Department:  Medications   sodium chloride 0.9 % flush 10 mL (has no administration in time range)   aspirin chewable tablet 324 mg (324 mg Oral Not Given 7/23/25 2049)   acetaminophen (OFIRMEV) injection 1,000 mg (has no administration in time range)   methylPREDNISolone sodium succinate (SOLU-Medrol) injection 40 mg (has no administration in time range)   capsaicin (ZOSTRIX) 0.075 % topical cream 1 Application (has no administration in time range)   trolamine salicylate (ASPERCREME) 10 % cream 1 Application (has no administration in time range)   albuterol (PROVENTIL) nebulizer solution 0.083% 2.5 mg/3mL (7.5 mg Nebulization Given 7/23/25 1847)   methylPREDNISolone sodium succinate (SOLU-Medrol) 125 mg in sterile water (preservative free) 2 mL (125 mg Intravenous Given 7/23/25 2056)   furosemide (LASIX) injection 60 mg (60 mg Intravenous Given 7/23/25 2108)        ED Course:         Labs:    Lab Results (last 24 hours)       Procedure Component Value Units Date/Time    Hemoglobin & Hematocrit, Blood [[id number]]  (Abnormal) Collected: 07/23/25 1001    Specimen: Blood from Arm, Right Updated: 07/23/25 1009     Hemoglobin 7.9 g/dL      Hematocrit 27.4 %     High Sensitivity Troponin T [[id number]]  (Abnormal) Collected: 07/23/25 1930    Specimen: Blood Updated: 07/23/25 2029     HS Troponin T 201 ng/L     Narrative:      High Sensitive Troponin T Reference Range:  <14.0 ng/L- Negative Female for AMI  <22.0 ng/L- Negative Male for AMI  >=14 - Abnormal Female indicating possible myocardial injury.  >=22 - Abnormal Male indicating possible myocardial injury.   Clinicians would have to utilize clinical acumen, EKG, Troponin, and serial changes to determine if it is an Acute Myocardial Infarction or myocardial injury due to an underlying chronic condition.         CBC & Differential [[id number]]  (Abnormal) Collected: 07/23/25 1930    Specimen: Blood  Updated: 07/23/25 2019    Narrative:      The following orders were created for panel order CBC & Differential.  Procedure                               Abnormality         Status                     ---------                               -----------         ------                     CBC Auto Differential[[id number]]        Abnormal            Final result               Scan Slide[[id number]]                                       Final result                 Please view results for these tests on the individual orders.    Comprehensive Metabolic Panel [[id number]]  (Abnormal) Collected: 07/23/25 1930    Specimen: Blood Updated: 07/23/25 2002     Glucose 129 mg/dL      BUN 25.1 mg/dL      Creatinine 2.61 mg/dL      Sodium 143 mmol/L      Potassium 4.5 mmol/L      Chloride 107 mmol/L      CO2 25.3 mmol/L      Calcium 8.1 mg/dL      Total Protein 6.1 g/dL      Albumin 2.9 g/dL      ALT (SGPT) 90 U/L      AST (SGOT) 69 U/L      Alkaline Phosphatase 335 U/L      Total Bilirubin 0.5 mg/dL      Globulin 3.2 gm/dL      A/G Ratio 0.9 g/dL      BUN/Creatinine Ratio 9.6     Anion Gap 10.7 mmol/L      eGFR 17.2 mL/min/1.73     Narrative:      GFR Categories in Chronic Kidney Disease (CKD)              GFR Category          GFR (mL/min/1.73)    Interpretation  G1                    90 or greater        Normal or high (1)  G2                    60-89                Mild decrease (1)  G3a                   45-59                Mild to moderate decrease  G3b                   30-44                Moderate to severe decrease  G4                    15-29                Severe decrease  G5                    14 or less           Kidney failure    (1)In the absence of evidence of kidney disease, neither GFR category G1 or G2 fulfill the criteria for CKD.    eGFR calculation 2021 CKD-EPI creatinine equation, which does not include race as a factor    Lipase [[id number]]  (Abnormal) Collected: 07/23/25 1930    Specimen: Blood Updated:  07/23/25 2002     Lipase 101 U/L     BNP [[id number]]  (Abnormal) Collected: 07/23/25 1930    Specimen: Blood Updated: 07/23/25 2013     proBNP 47,640.0 pg/mL     Narrative:      This assay is used as an aid in the diagnosis of individuals suspected of having heart failure. It can be used as an aid in the diagnosis of acute decompensated heart failure (ADHF) in patients presenting with signs and symptoms of ADHF to the emergency department (ED). In addition, NT-proBNP of <300 pg/mL indicates ADHF is not likely.    Age Range Result Interpretation  NT-proBNP Concentration (pg/mL:      <50             Positive            >450                   Gray                 300-450                    Negative             <300    50-75           Positive            >900                  Gray                300-900                  Negative            <300      >75             Positive            >1800                  Gray                300-1800                  Negative            <300    Magnesium [[id number]]  (Normal) Collected: 07/23/25 1930    Specimen: Blood Updated: 07/23/25 2002     Magnesium 2.4 mg/dL     CBC Auto Differential [[id number]]  (Abnormal) Collected: 07/23/25 1930    Specimen: Blood Updated: 07/23/25 2016     WBC 5.90 10*3/mm3      RBC 3.73 10*6/mm3      Hemoglobin 11.9 g/dL      Hematocrit 39.3 %      .4 fL      MCH 31.9 pg      MCHC 30.3 g/dL      RDW 22.0 %      RDW-SD 79.6 fl      MPV 9.4 fL      Platelets 160 10*3/mm3      Neutrophil % 79.8 %      Lymphocyte % 6.6 %      Monocyte % 10.2 %      Eosinophil % 2.2 %      Basophil % 0.7 %      Immature Grans % 0.5 %      Neutrophils, Absolute 4.71 10*3/mm3      Lymphocytes, Absolute 0.39 10*3/mm3      Monocytes, Absolute 0.60 10*3/mm3      Eosinophils, Absolute 0.13 10*3/mm3      Basophils, Absolute 0.04 10*3/mm3      Immature Grans, Absolute 0.03 10*3/mm3      nRBC 0.0 /100 WBC     Scan Slide [[id number]] Collected: 07/23/25 1930    Specimen: Blood  Updated: 07/23/25 2019     Anisocytosis Slight/1+     Basophilic Stippling Slight/1+     Vicky Cells Slight/1+     Hypochromia Slight/1+     Macrocytes Slight/1+     WBC Morphology Normal     Platelet Estimate Decreased             Imaging:    XR Chest 1 View  Result Date: 7/23/2025  XR CHEST 1 VW Date of Exam: 7/23/2025 5:54 PM EDT Indication: Chest Pain Triage Protocol Comparison: Chest radiograph 7/9/2025, chest CT 5/6/2025 Findings: Dual-lumen right IJ central catheter in similar position near cavoatrial junction. Bandlike consolidation in the right perihilar region without significant change. Left upper lobectomy with expected volume loss. Radiographically similar appearing left pleural parenchymal scarring and pleural calcifications. Similar residual subpleural opacities in the left lower lobe which could reflect atelectasis/scar. Chronic changes of underlying emphysema. Stable cardiomediastinal silhouette. Aortic atherosclerotic disease. No visualized pneumothorax. No significant right effusion. Degenerative related osseous change.     Impression: Numerous chronic findings radiographically similar to 7/9/2025. No appreciable new or acute finding. Electronically Signed: Ildefonso Purcell MD  7/23/2025 6:01 PM EDT  Workstation ID: [id number]        Differential Diagnosis and Discussion:    Dyspnea: Differential diagnosis includes but is not limited to metabolic acidosis, neurological disorders, psychogenic, asthma, pneumothorax, upper airway obstruction, COPD, pneumonia, noncardiogenic pulmonary edema, interstitial lung disease, anemia, congestive heart failure, and pulmonary embolism    PROCEDURES:    Labs were collected in the emergency department and all labs were reviewed and interpreted by me.  X-ray were performed in the emergency department and all X-ray impressions were independently interpreted by me.  An EKG was performed and the EKG was interpreted by me.    ECG 12 Lead ED Triage Standing Order; Chest  Pain   Preliminary Result   HEART RATE=95  bpm   RR Itsborzb=287  ms   NE Rdwpduzb=112  ms   P Horizontal Axis=29  deg   P Front Axis=98  deg   QRSD Interval=95  ms   QT Upzvgwkl=084  ms   WNjX=070  ms   QRS Axis=80  deg   T Wave Axis=121  deg   - ABNORMAL ECG -   Sinus rhythm   Atrial premature complex   Borderline prolonged NE interval   Left ventricular hypertrophy   Anterior Q waves, possibly due to LVH   Abnormal T, consider ischemia, lateral leads   When compared with ECG of 09-Jul-2025 14:49:19,   Significant change in rhythm   Date and Time of Study:2025-07-23 17:48:14          Procedures    MDM     Amount and/or Complexity of Data Reviewed  Decide to obtain previous medical records or to obtain history from someone other than the patient: yes         The patient´s CBC that was reviewed and interpreted by me shows no abnormalities of critical concern. Of note, there is no anemia requiring a blood transfusion and the platelet count is acceptable.  The patient´s CMP that was reviewed and interpretted by me shows no abnormalities of critical concern. Of note, the patient´s sodium and potassium are acceptable. The patient´s liver enzymes are unremarkable. The patient´s renal function (creatinine) is 2.6. The patient has a normal anion gap.  BNP is 47,000.  Patient was given an hour-long breathing treatment and Lasix in the ED.    Total Critical Care time of 40 minutes. Total critical care time documented does not include time spent on separately billed procedures for services of nurses or physician assistants. I personally saw and examined the patient. I have reviewed all diagnostic interpretations and treatment plans as written. I was present for the key portions of any procedures performed and the inclusive time noted in any critical care statement. Critical care time includes patient management by me, time spent at the patients bedside,  time to review lab and imaging results, discussing patient care,  documentation in the medical record, and time spent with family or caregiver.          Patient Care Considerations:    None      Consultants/Shared Management Plan:    Case was discussed with the hospitalist who agrees with admission.    Social Determinants of Health:    Patient is independent, reliable, and has access to care.       Disposition and Care Coordination:    Admit:   Through independent evaluation of the patient's history, physical, and imperical data, the patient meets criteria for inpatient admission to the hospital.        Final diagnoses:   Acute respiratory failure, unspecified whether with hypoxia or hypercapnia        ED Disposition       ED Disposition   Decision to Admit    Condition   --    Comment   Level of Care: Telemetry [5]   Diagnosis: Acute respiratory failure [518.81.ICD-9-CM]   Admitting Physician: MIKA MATTHEWS [[id number]]   Attending Physician: MIKA MATTHEWS [[id number]]                 This medical record created using voice recognition software.             Ken Cuenca MD  07/23/25 2128

## 2025-07-24 ENCOUNTER — APPOINTMENT (OUTPATIENT)
Dept: CT IMAGING | Facility: HOSPITAL | Age: 89
DRG: 205 | End: 2025-07-24
Payer: MEDICARE

## 2025-07-24 LAB
ANION GAP SERPL CALCULATED.3IONS-SCNC: 11.7 MMOL/L (ref 5–15)
B PARAPERT DNA SPEC QL NAA+PROBE: NOT DETECTED
B PERT DNA SPEC QL NAA+PROBE: NOT DETECTED
BASOPHILS # BLD AUTO: 0.03 10*3/MM3 (ref 0–0.2)
BASOPHILS NFR BLD AUTO: 0.5 % (ref 0–1.5)
BH BB BLOOD EXPIRATION DATE: NORMAL
BH BB BLOOD EXPIRATION DATE: NORMAL
BH BB BLOOD TYPE BARCODE: 5100
BH BB BLOOD TYPE BARCODE: 5100
BH BB DISPENSE STATUS: NORMAL
BH BB DISPENSE STATUS: NORMAL
BH BB PRODUCT CODE: NORMAL
BH BB PRODUCT CODE: NORMAL
BH BB UNIT NUMBER: NORMAL
BH BB UNIT NUMBER: NORMAL
BUN SERPL-MCNC: 29 MG/DL (ref 8–23)
BUN/CREAT SERPL: 11.7 (ref 7–25)
C PNEUM DNA NPH QL NAA+NON-PROBE: NOT DETECTED
CALCIUM SPEC-SCNC: 8.1 MG/DL (ref 8.6–10.5)
CHLORIDE SERPL-SCNC: 109 MMOL/L (ref 98–107)
CO2 SERPL-SCNC: 19.3 MMOL/L (ref 22–29)
CREAT SERPL-MCNC: 2.48 MG/DL (ref 0.57–1)
CROSSMATCH INTERPRETATION: NORMAL
CROSSMATCH INTERPRETATION: NORMAL
DEPRECATED RDW RBC AUTO: 82.1 FL (ref 37–54)
EGFRCR SERPLBLD CKD-EPI 2021: 18.3 ML/MIN/1.73
EOSINOPHIL # BLD AUTO: 0 10*3/MM3 (ref 0–0.4)
EOSINOPHIL NFR BLD AUTO: 0 % (ref 0.3–6.2)
ERYTHROCYTE [DISTWIDTH] IN BLOOD BY AUTOMATED COUNT: 21.8 % (ref 12.3–15.4)
FLUAV SUBTYP SPEC NAA+PROBE: NOT DETECTED
FLUBV RNA NPH QL NAA+NON-PROBE: NOT DETECTED
GEN 5 1HR TROPONIN T REFLEX: 189 NG/L
GLUCOSE SERPL-MCNC: 143 MG/DL (ref 65–99)
HADV DNA SPEC NAA+PROBE: NOT DETECTED
HBV CORE IGM SERPL QL IA: NORMAL
HBV SURFACE AB SER RIA-ACNC: NORMAL
HBV SURFACE AG SERPL QL IA: NORMAL
HCOV 229E RNA SPEC QL NAA+PROBE: NOT DETECTED
HCOV HKU1 RNA SPEC QL NAA+PROBE: NOT DETECTED
HCOV NL63 RNA SPEC QL NAA+PROBE: NOT DETECTED
HCOV OC43 RNA SPEC QL NAA+PROBE: NOT DETECTED
HCT VFR BLD AUTO: 41.1 % (ref 34–46.6)
HGB BLD-MCNC: 12.3 G/DL (ref 12–15.9)
HMPV RNA NPH QL NAA+NON-PROBE: NOT DETECTED
HPIV1 RNA ISLT QL NAA+PROBE: NOT DETECTED
HPIV2 RNA SPEC QL NAA+PROBE: NOT DETECTED
HPIV3 RNA NPH QL NAA+PROBE: NOT DETECTED
HPIV4 P GENE NPH QL NAA+PROBE: NOT DETECTED
IMM GRANULOCYTES # BLD AUTO: 0.05 10*3/MM3 (ref 0–0.05)
IMM GRANULOCYTES NFR BLD AUTO: 0.8 % (ref 0–0.5)
LYMPHOCYTES # BLD AUTO: 0.15 10*3/MM3 (ref 0.7–3.1)
LYMPHOCYTES NFR BLD AUTO: 2.4 % (ref 19.6–45.3)
M PNEUMO IGG SER IA-ACNC: NOT DETECTED
MAGNESIUM SERPL-MCNC: 1.9 MG/DL (ref 1.6–2.4)
MCH RBC QN AUTO: 31.6 PG (ref 26.6–33)
MCHC RBC AUTO-ENTMCNC: 29.9 G/DL (ref 31.5–35.7)
MCV RBC AUTO: 105.7 FL (ref 79–97)
MONOCYTES # BLD AUTO: 0.1 10*3/MM3 (ref 0.1–0.9)
MONOCYTES NFR BLD AUTO: 1.6 % (ref 5–12)
MRSA DNA SPEC QL NAA+PROBE: NORMAL
NEUTROPHILS NFR BLD AUTO: 5.93 10*3/MM3 (ref 1.7–7)
NEUTROPHILS NFR BLD AUTO: 94.7 % (ref 42.7–76)
NRBC BLD AUTO-RTO: 0 /100 WBC (ref 0–0.2)
PHOSPHATE SERPL-MCNC: 4.8 MG/DL (ref 2.5–4.5)
PLATELET # BLD AUTO: 139 10*3/MM3 (ref 140–450)
PMV BLD AUTO: 9.5 FL (ref 6–12)
POTASSIUM SERPL-SCNC: 4.9 MMOL/L (ref 3.5–5.2)
PROCALCITONIN SERPL-MCNC: 0.36 NG/ML (ref 0–0.25)
RBC # BLD AUTO: 3.89 10*6/MM3 (ref 3.77–5.28)
RHINOVIRUS RNA SPEC NAA+PROBE: NOT DETECTED
RSV RNA NPH QL NAA+NON-PROBE: NOT DETECTED
SARS-COV-2 RNA RESP QL NAA+PROBE: NOT DETECTED
SODIUM SERPL-SCNC: 140 MMOL/L (ref 136–145)
TROPONIN T % DELTA: -6
TROPONIN T NUMERIC DELTA: -12 NG/L
UNIT  ABO: NORMAL
UNIT  ABO: NORMAL
UNIT  RH: NORMAL
UNIT  RH: NORMAL
WBC NRBC COR # BLD AUTO: 6.26 10*3/MM3 (ref 3.4–10.8)

## 2025-07-24 PROCEDURE — 25010000002 HEPARIN (PORCINE) PER 1000 UNITS: Performed by: INTERNAL MEDICINE

## 2025-07-24 PROCEDURE — 99233 SBSQ HOSP IP/OBS HIGH 50: CPT | Performed by: INTERNAL MEDICINE

## 2025-07-24 PROCEDURE — 83735 ASSAY OF MAGNESIUM: CPT | Performed by: HOSPITALIST

## 2025-07-24 PROCEDURE — 84100 ASSAY OF PHOSPHORUS: CPT | Performed by: HOSPITALIST

## 2025-07-24 PROCEDURE — 94799 UNLISTED PULMONARY SVC/PX: CPT

## 2025-07-24 PROCEDURE — 25010000002 LABETALOL 5 MG/ML SOLUTION: Performed by: STUDENT IN AN ORGANIZED HEALTH CARE EDUCATION/TRAINING PROGRAM

## 2025-07-24 PROCEDURE — 25010000002 METHYLPREDNISOLONE PER 40 MG: Performed by: HOSPITALIST

## 2025-07-24 PROCEDURE — 87641 MR-STAPH DNA AMP PROBE: CPT | Performed by: HOSPITALIST

## 2025-07-24 PROCEDURE — G0378 HOSPITAL OBSERVATION PER HR: HCPCS

## 2025-07-24 PROCEDURE — 87040 BLOOD CULTURE FOR BACTERIA: CPT | Performed by: INTERNAL MEDICINE

## 2025-07-24 PROCEDURE — 84145 PROCALCITONIN (PCT): CPT | Performed by: HOSPITALIST

## 2025-07-24 PROCEDURE — 25010000002 AMPICILLIN-SULBACTAM PER 1.5 G: Performed by: INTERNAL MEDICINE

## 2025-07-24 PROCEDURE — 0202U NFCT DS 22 TRGT SARS-COV-2: CPT | Performed by: HOSPITALIST

## 2025-07-24 PROCEDURE — 80048 BASIC METABOLIC PNL TOTAL CA: CPT | Performed by: HOSPITALIST

## 2025-07-24 PROCEDURE — 94664 DEMO&/EVAL PT USE INHALER: CPT

## 2025-07-24 PROCEDURE — 25010000002 ACETAMINOPHEN 10 MG/ML SOLUTION: Performed by: HOSPITALIST

## 2025-07-24 PROCEDURE — 92610 EVALUATE SWALLOWING FUNCTION: CPT

## 2025-07-24 PROCEDURE — 94760 N-INVAS EAR/PLS OXIMETRY 1: CPT

## 2025-07-24 PROCEDURE — 85025 COMPLETE CBC W/AUTO DIFF WBC: CPT | Performed by: HOSPITALIST

## 2025-07-24 PROCEDURE — 71250 CT THORAX DX C-: CPT

## 2025-07-24 PROCEDURE — 74150 CT ABDOMEN W/O CONTRAST: CPT

## 2025-07-24 RX ORDER — HEPARIN SODIUM 5000 [USP'U]/ML
5000 INJECTION, SOLUTION INTRAVENOUS; SUBCUTANEOUS EVERY 8 HOURS SCHEDULED
Status: DISCONTINUED | OUTPATIENT
Start: 2025-07-24 | End: 2025-07-28 | Stop reason: HOSPADM

## 2025-07-24 RX ORDER — HEPARIN SODIUM 1000 [USP'U]/ML
3200 INJECTION, SOLUTION INTRAVENOUS; SUBCUTANEOUS AS NEEDED
Status: DISCONTINUED | OUTPATIENT
Start: 2025-07-24 | End: 2025-07-28 | Stop reason: HOSPADM

## 2025-07-24 RX ORDER — ATORVASTATIN CALCIUM 40 MG/1
40 TABLET, FILM COATED ORAL DAILY
COMMUNITY
End: 2025-07-28 | Stop reason: HOSPADM

## 2025-07-24 RX ORDER — LABETALOL HYDROCHLORIDE 5 MG/ML
10 INJECTION, SOLUTION INTRAVENOUS ONCE
Status: COMPLETED | OUTPATIENT
Start: 2025-07-25 | End: 2025-07-24

## 2025-07-24 RX ORDER — ROSUVASTATIN CALCIUM 5 MG/1
10 TABLET, COATED ORAL NIGHTLY
Status: DISCONTINUED | OUTPATIENT
Start: 2025-07-24 | End: 2025-07-28 | Stop reason: HOSPADM

## 2025-07-24 RX ORDER — HYDROXYZINE HYDROCHLORIDE 25 MG/1
50 TABLET, FILM COATED ORAL ONCE
Status: COMPLETED | OUTPATIENT
Start: 2025-07-24 | End: 2025-07-24

## 2025-07-24 RX ORDER — MENTHOL AND ZINC OXIDE .44; 20.625 G/100G; G/100G
1 OINTMENT TOPICAL EVERY 12 HOURS SCHEDULED
COMMUNITY

## 2025-07-24 RX ORDER — OMEPRAZOLE 20 MG/1
20 CAPSULE, DELAYED RELEASE ORAL DAILY
COMMUNITY
End: 2025-07-28 | Stop reason: HOSPADM

## 2025-07-24 RX ORDER — ATORVASTATIN CALCIUM 40 MG/1
40 TABLET, FILM COATED ORAL DAILY
Status: DISCONTINUED | OUTPATIENT
Start: 2025-07-24 | End: 2025-07-24

## 2025-07-24 RX ADMIN — CARVEDILOL 6.25 MG: 6.25 TABLET, FILM COATED ORAL at 01:12

## 2025-07-24 RX ADMIN — ACETAMINOPHEN 1000 MG: 1000 INJECTION, SOLUTION INTRAVENOUS at 01:12

## 2025-07-24 RX ADMIN — ISOSORBIDE MONONITRATE 30 MG: 30 TABLET, EXTENDED RELEASE ORAL at 20:27

## 2025-07-24 RX ADMIN — LABETALOL HYDROCHLORIDE 10 MG: 5 INJECTION, SOLUTION INTRAVENOUS at 23:52

## 2025-07-24 RX ADMIN — Medication 10 ML: at 20:27

## 2025-07-24 RX ADMIN — LEVOTHYROXINE SODIUM 50 MCG: 0.05 TABLET ORAL at 06:30

## 2025-07-24 RX ADMIN — ARFORMOTEROL TARTRATE 15 MCG: 15 SOLUTION RESPIRATORY (INHALATION) at 10:01

## 2025-07-24 RX ADMIN — Medication 5 MG: at 01:13

## 2025-07-24 RX ADMIN — TOBRAMYCIN 300 MG: 300 SOLUTION RESPIRATORY (INHALATION) at 21:34

## 2025-07-24 RX ADMIN — AMPICILLIN SODIUM AND SULBACTAM SODIUM 3 G: 2; 1 INJECTION, POWDER, FOR SOLUTION INTRAMUSCULAR; INTRAVENOUS at 15:01

## 2025-07-24 RX ADMIN — IPRATROPIUM BROMIDE AND ALBUTEROL SULFATE 3 ML: .5; 3 SOLUTION RESPIRATORY (INHALATION) at 13:25

## 2025-07-24 RX ADMIN — ISOSORBIDE MONONITRATE 30 MG: 30 TABLET, EXTENDED RELEASE ORAL at 01:13

## 2025-07-24 RX ADMIN — BUDESONIDE 0.5 MG: 0.5 SUSPENSION RESPIRATORY (INHALATION) at 00:05

## 2025-07-24 RX ADMIN — Medication 5 MG: at 20:27

## 2025-07-24 RX ADMIN — CAPSAICIN 1 APPLICATION: 0.75 CREAM TOPICAL at 15:08

## 2025-07-24 RX ADMIN — ROSUVASTATIN CALCIUM 10 MG: 5 TABLET, FILM COATED ORAL at 01:13

## 2025-07-24 RX ADMIN — HEPARIN SODIUM 3200 UNITS: 1000 INJECTION INTRAVENOUS; SUBCUTANEOUS at 11:59

## 2025-07-24 RX ADMIN — TROLAMINE SALICYLATE 1 APPLICATION: 100 CREAM TOPICAL at 06:28

## 2025-07-24 RX ADMIN — TROLAMINE SALICYLATE 1 APPLICATION: 100 CREAM TOPICAL at 01:13

## 2025-07-24 RX ADMIN — HEPARIN SODIUM 5000 UNITS: 5000 INJECTION INTRAVENOUS; SUBCUTANEOUS at 23:10

## 2025-07-24 RX ADMIN — METHYLPREDNISOLONE SODIUM SUCCINATE 40 MG: 40 INJECTION, POWDER, FOR SOLUTION INTRAMUSCULAR; INTRAVENOUS at 23:10

## 2025-07-24 RX ADMIN — ROSUVASTATIN CALCIUM 10 MG: 5 TABLET, FILM COATED ORAL at 20:21

## 2025-07-24 RX ADMIN — TOBRAMYCIN 300 MG: 300 SOLUTION RESPIRATORY (INHALATION) at 00:05

## 2025-07-24 RX ADMIN — CAPSAICIN 1 APPLICATION: 0.75 CREAM TOPICAL at 23:10

## 2025-07-24 RX ADMIN — TROLAMINE SALICYLATE 1 APPLICATION: 100 CREAM TOPICAL at 15:09

## 2025-07-24 RX ADMIN — TOBRAMYCIN 300 MG: 300 SOLUTION RESPIRATORY (INHALATION) at 10:01

## 2025-07-24 RX ADMIN — FERROUS SULFATE TAB 325 MG (65 MG ELEMENTAL FE) 325 MG: 325 (65 FE) TAB at 08:27

## 2025-07-24 RX ADMIN — IPRATROPIUM BROMIDE AND ALBUTEROL SULFATE 3 ML: .5; 3 SOLUTION RESPIRATORY (INHALATION) at 18:48

## 2025-07-24 RX ADMIN — ARFORMOTEROL TARTRATE 15 MCG: 15 SOLUTION RESPIRATORY (INHALATION) at 00:05

## 2025-07-24 RX ADMIN — METHYLPREDNISOLONE SODIUM SUCCINATE 40 MG: 40 INJECTION, POWDER, FOR SOLUTION INTRAMUSCULAR; INTRAVENOUS at 08:54

## 2025-07-24 RX ADMIN — PANTOPRAZOLE SODIUM 40 MG: 40 TABLET, DELAYED RELEASE ORAL at 06:28

## 2025-07-24 RX ADMIN — HEPARIN SODIUM 5000 UNITS: 5000 INJECTION INTRAVENOUS; SUBCUTANEOUS at 15:28

## 2025-07-24 RX ADMIN — LOSARTAN POTASSIUM 25 MG: 25 TABLET, FILM COATED ORAL at 08:27

## 2025-07-24 RX ADMIN — ASPIRIN 81 MG: 81 TABLET, COATED ORAL at 08:28

## 2025-07-24 RX ADMIN — BUDESONIDE 0.5 MG: 0.5 SUSPENSION RESPIRATORY (INHALATION) at 10:01

## 2025-07-24 RX ADMIN — Medication 10 ML: at 01:13

## 2025-07-24 RX ADMIN — ARFORMOTEROL TARTRATE 15 MCG: 15 SOLUTION RESPIRATORY (INHALATION) at 21:35

## 2025-07-24 RX ADMIN — CLOPIDOGREL BISULFATE 75 MG: 75 TABLET, FILM COATED ORAL at 08:27

## 2025-07-24 RX ADMIN — HYDROXYZINE HYDROCHLORIDE 50 MG: 25 TABLET, FILM COATED ORAL at 01:12

## 2025-07-24 RX ADMIN — BUDESONIDE 0.5 MG: 0.5 SUSPENSION RESPIRATORY (INHALATION) at 21:35

## 2025-07-24 RX ADMIN — CARVEDILOL 6.25 MG: 6.25 TABLET, FILM COATED ORAL at 17:56

## 2025-07-24 RX ADMIN — Medication 10 ML: at 15:02

## 2025-07-24 RX ADMIN — Medication 10 ML: at 08:30

## 2025-07-24 RX ADMIN — CAPSAICIN 1 APPLICATION: 0.75 CREAM TOPICAL at 01:13

## 2025-07-24 RX ADMIN — TROLAMINE SALICYLATE 1 APPLICATION: 100 CREAM TOPICAL at 23:10

## 2025-07-24 RX ADMIN — CAPSAICIN 1 APPLICATION: 0.75 CREAM TOPICAL at 06:29

## 2025-07-24 NOTE — PLAN OF CARE
Goal Outcome Evaluation:      Awake and alert, able to make needs known. Had dialysis this morning, cutting treatment short. Body temperature 97.4 with heating blanket in use. TDC care done by dialysis nurse. Able to make all needs known. Continue with current plan of care.

## 2025-07-24 NOTE — PLAN OF CARE
Goal Outcome Evaluation:   ASSESSMENT/ PLAN OF CARE:  Pt presents with limitations, noted below, that impede patients ability to tolerate least restrictive diet safely and independently. The skills of a therapist will be required to safely and effectively implement the following treatment plan to restore maximal level of function.    PROBLEMS:  1.  Risk of aspiration, swallow delay, decreased oral motor strength/rom   TREATMENT: speech therapy for dysphagia, education of strategies and tolerance of least restrictive diet.       FREQUENCY/DURATION:  daily 5 days a week    REHAB POTENTIAL:  Pt has good rehab potential.  The following limitations may influence improvement/ length of tx  medical status.    RECOMMENDATIONS:   1.   DIET: soft to chew solids, thin liquids    2.  POSITION: fully upright for all po, 30 minutes following.     3.  COMPENSATORY STRATEGIES: assist for set up, small bites and sips, meds in applesauce.                 Anticipated Discharge Disposition (SLP): anticipate therapy at next level of care

## 2025-07-24 NOTE — H&P
[facility]   HOSPITALIST HISTORY AND PHYSICAL  Date: 2025   Patient Name: Charlette Rai  : 1935  MRN: [id number]  Primary Care Physician:  Brennan Mosqueda MD  Date of admission: 2025    Subjective dyspnea  Subjective   Chief Complaint: Dyspnea    HPI:Patient is an 89-year-old female with PAD, renal artery stenosis status post stenting, hypertension, ESRD on hemodialysis, diastolic CHF, COPD, lung cancer on chemotherapy, type 2 diabetes, hypothyroidism presents with acute shortness of breath that occurred while she was getting a blood transfusion.  Patient was recently in the hospital for UTI.    On arrival to the ED, patient temperature 97.5, pulse of 94, respiratory 20, blood pressure 193/73, she is on 4 L of oxygen saturating 100%.  Chest x-ray shows no new findings.    On labs, patient's troponin is 201, proBNP is 47,640, sodium is 143, creatinine is 2.61, glucose is 129, calcium is 8.1, alkaline phosphatase is 335.  AST is 69, ALT is 90, lipase is 101.  Hemoglobin is 11.9.  Personal History     Past Medical History:  Past Medical History:   Diagnosis Date    Allergic rhinitis     Anaphylactic shock, unspecified, initial encounter 2023    Anemia     Arthritis     Asthma     USE INHALERS AND NEBULIZERS    Back pain     Bladder disorder     Cancer     LEFT LUNG CANCER  SURGERY AND CHEMO DONE  AND CURRENTLY   RIGHT LUNG CANCER HAS ONLY RECEIVED RADIATION THUS FAR    Chronic kidney disease     stage 3    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 DENIES CP BUT DOES HAVE SOA CHRONIC ISSUE COPD/LUNG CANCER    COPD (chronic obstructive pulmonary disease)     FOLLOWED BY DR MARIAJOSE BAILEY    Coronary artery disease     DENIES CP BUT DOES GET SOA MOST OF THE TIME WITH EXERTION BUT OCC AT REST CHRONIC ISSUE  COPD/LUNG CANCER    Deep vein thrombosis     Diabetes mellitus     DOES NOT CHECK BS DAILY    Disease of thyroid gland     HYPOTHYROIDISM    Essential hypertension     Gastric ulcer     GERD (gastroesophageal reflux disease)     Heart murmur     History of transfusion     NO ISSUES POST TRANSFUSION WAS MANY YEARS AGO    Hyperlipemia     Leukocytopenia     FOLLOWED BY DR MIR BAILEY    Limb swelling     Lumbago     Lumbar spinal stenosis     Lung cancer     Migraine headache     Multiple joint pain     On home oxygen therapy     4L/NC PRN    Osteopenia     PNA (pneumonia) 05/04/2024    Pseudomonas pneumonia 04/29/2024    Reflux esophagitis     Shortness of breath     Thyroid nodule     HAS ULTRASOUND YEARLY BEING MONITORED    Vascular disease     Vitamin D deficiency        Past Surgical History:  Past Surgical History:   Procedure Laterality Date    ABDOMINAL SURGERY      ANGIOPLASTY RENAL ARTERY Left 06/14/2024    stent placement    APPENDECTOMY      ARTERIOGRAM N/A 06/14/2024    Procedure: Arteriogram, right radial access, aortogram and renal arteriogram with possible intervention.;  Surgeon: Dio Miguel MD;  Location: [facility] CATH INVASIVE LOCATION;  Service: Peripheral Vascular;  Laterality: N/A;    ARTERIOVENOUS FISTULA/SHUNT SURGERY Left 05/10/2022    Procedure: Left basilic vein transposition;  Surgeon: Wan Barksdale MD;  Location: [facility] MAIN OR;  Service: Vascular;  Laterality: Left;    ARTERIOVENOUS FISTULA/SHUNT SURGERY Left 03/23/2023    Procedure: Ligation of left arm arteriovenous fistula;  Surgeon: Wan Barksdale MD;  Location: [facility] MAIN OR;  Service: Vascular;  Laterality: Left;    ARTERIOVENOUS FISTULA/SHUNT SURGERY Left 11/30/2023    Procedure: Creation of left arm arteriovenous graft;  Surgeon: Wan Barksdale MD;  Location: [facility] MAIN OR;  Service: Vascular;  Laterality: Left;    BRONCHOSCOPY N/A 04/24/2024    Procedure: BRONCHOSCOPY WITH BAL AND WASHINGS;  Surgeon: Khalif Yanez MD;   Location: [facility] ENDOSCOPY;  Service: Pulmonary;  Laterality: N/A;  MUCUS PLUGGING    CARDIAC CATHETERIZATION      CARDIAC SURGERY      CARDIAC SURGERY      fluid drained from heart    CATARACT EXTRACTION, BILATERAL      COLONOSCOPY      ENDOSCOPY  2016    ENDOSCOPY N/A 2025    Procedure: ESOPHAGOGASTRODUODENOSCOPY;  Surgeon: Tanvi Del Valle MD;  Location: [facility] ENDOSCOPY;  Service: Gastroenterology;  Laterality: N/A;  gastritis, esophagitis    FEMORAL ARTERY STENT Bilateral     HYSTERECTOMY      LEG THROMBECTOMY/EMBOLECTOMY Left 2025    Procedure: LEFT LOWER EXTREMITY ANGIOGRAM;  Surgeon: Jaden Castañeda MD;  Location: [facility] HYBRID OR;  Service: Vascular;  Laterality: Left;    LUNG BIOPSY Left 2005    lobectomy upper lung caner    LUNG VOLUME REDUCTION      OTHER SURGICAL HISTORY      artifical joints/limbs    REPLACEMENT TOTAL KNEE Left     SHUNT O GRAM N/A 2025    Procedure: dialysis shuntogram;  Surgeon: Court Valente MD;  Location: [facility] CATH INVASIVE LOCATION;  Service: Peripheral Vascular;  Laterality: N/A;    UPPER GASTROINTESTINAL ENDOSCOPY         Family History:   Family History   Problem Relation Age of Onset    Arthritis Mother     Arthritis Father     Cancer Brother     Diabetes Brother     Other Brother         blood disease     Prostate cancer Brother     Cancer Brother     Prostate cancer Brother     Cancer Brother     Cancer Brother     Malig Hyperthermia Neg Hx     Colon cancer Neg Hx        Social History:   Social History     Socioeconomic History    Marital status:    Tobacco Use    Smoking status: Former     Current packs/day: 0.00     Average packs/day: 1 pack/day for 52.5 years (52.5 ttl pk-yrs)     Types: Cigarettes     Start date:      Quit date: 2004     Years since quittin.0     Passive exposure: Past    Smokeless tobacco: Never   Vaping Use    Vaping status: Never Used   Substance and Sexual Activity    Alcohol  use: Not Currently     Comment: beer many years ago    Drug use: Never    Sexual activity: Defer       Home Medications:  Methoxy PEG-Epoetin Beta, acetaminophen, albuterol sulfate HFA, aspirin, budesonide, carvedilol, clopidogrel, dexlansoprazole, ferrous gluconate, fluticasone, furosemide, ipratropium-albuterol, isosorbide mononitrate, levothyroxine, lidocaine-prilocaine, losartan, rosuvastatin, tobramycin PF, and vitamin D    Allergies:  No Known Allergies    Review of Systems   All systems were reviewed and negative except for: Shortness of breath    Objective   Objective     Vitals:   Temp:  [97.5 °F (36.4 °C)-98.2 °F (36.8 °C)] 97.5 °F (36.4 °C)  Heart Rate:  [77-94] 89  Resp:  [12-28] 12  BP: (134-212)/() 178/61  Flow (L/min) (Oxygen Therapy):  [2-4] 2    Physical Exam    Constitutional: Awake, alert, no acute distress   Eyes: Pupils equal, sclerae anicteric, no conjunctival injection   HENT: NCAT, mucous membranes moist   Neck: Supple, no thyromegaly, no lymphadenopathy, trachea midline   Respiratory: Clear to auscultation bilaterally, nonlabored respirations    Cardiovascular: RRR, no murmurs, rubs, or gallops, palpable pedal pulses bilaterally   Gastrointestinal: Positive bowel sounds, soft, nontender, nondistended   Musculoskeletal: No bilateral ankle edema, no clubbing or cyanosis to extremities   Psychiatric: Appropriate affect, cooperative   Neurologic: Oriented x 3, strength symmetric in all extremities, Cranial Nerves grossly intact to confrontation, speech clear   Skin: No rashes     Result Review    Result Review:  I have personally reviewed the results from the time of this admission to 7/23/2025 21:10 EDT and agree with these findings:  [x]  Laboratory  [x]  Microbiology  [x]  Radiology  [x]  EKG/Telemetry   [x]  Cardiology/Vascular   [x]  Pathology  [x]  Old records  []  Other:      Assessment & Plan   Assessment / Plan   #1 anemia multifactorial secondary to CKD, recent esophagitis and  gastritis and iron deficiency  - 1 unit of blood today.  Started having shortness of breath.  Received Solu-Medrol in the ER.  Improving with breathing treatments.    #2 end-stage renal disease on hemodialysis  - Nephrology consulted for hemodialysis     #3 severe protein calorie malnutrition-dietary supplements ordered     #4 possible chronic diastolic heart failure not acutely exacerbated  -Given IV Lasix in the ER     #5 hypothyroidism-continue home Synthroid     #6 chronic hypoxemia on home oxygen  - Continue inhalers.  Patient on tobramycin nebulizer as well.    #7 hypertension  -Continue home regimen.           VTE Prophylaxis:  Mechanical VTE prophylaxis orders are signed & held.          CODE STATUS:    Code Status (Patient has no pulse and is not breathing): No CPR (Do Not Attempt to Resuscitate)  Medical Interventions (Patient has pulse or is breathing): Limited Support  Medical Intervention Limits: No intubation (DNI)      Admission Status:  I believe this patient meets observation status.    Electronically signed by Maren Patterson DO, 07/23/25, 8:56 PM EDT.

## 2025-07-24 NOTE — THERAPY EVALUATION
Acute Care - Speech Language Pathology   Swallow Initial Evaluation MAURICE Escamilla     Patient Name: Charlette Rai  : 1935  MRN: [id number]  Today's Date: 2025               Admit Date: 2025    Visit Dx:     ICD-10-CM ICD-9-CM   1. Acute respiratory failure, unspecified whether with hypoxia or hypercapnia  J96.00 518.81   2. Oropharyngeal dysphagia  R13.12 787.22     Patient Active Problem List   Diagnosis    Malignant neoplasm of upper lobe of right lung    Rheumatoid arthritis    Asthma    Chronic heart failure with preserved ejection fraction    Essential hypertension    GERD (gastroesophageal reflux disease)    Hyperlipidemia LDL goal <70    Lumbar spinal stenosis    Migraine    Monoclonal paraproteinemia    Osteopenia    Oxygen dependent    Peripheral neuropathy    Stage 4 chronic kidney disease    Vitamin D deficiency    Carotid artery stenosis    Chronic right-sided thoracic back pain    Peripheral vascular disease of lower extremity    Ex-smoker    De Quervain's tenosynovitis    Arthritis of carpometacarpal (CMC) joint of right thumb    Steal syndrome of dialysis vascular access    Anemia of chronic renal failure, stage 4 (severe)    Aortic stenosis, moderate    Hematoma    End stage renal disease on dialysis    Coronary artery calcification seen on CT scan    Frailty syndrome in geriatric patient    COPD with lower respiratory infection    Coagulation defect, unspecified    Hyperphosphatemia    Hypothyroidism (acquired)    Idiopathic osteoarthritis    Secondary multiple arthritis    Type 2 diabetes mellitus with diabetic peripheral angiopathy without gangrene    Renal artery stenosis    S/P renal artery angioplasty    Atherosclerosis of renal artery    Abnormal serum immunoelectrophoresis    NSVT (nonsustained ventricular tachycardia)    Hypertensive heart and chronic kidney disease with heart failure and with stage 5 chronic kidney disease, or end stage renal disease    Unspecified  protein-calorie malnutrition    IFG (impaired fasting glucose)    Iron deficiency anemia    AV graft malfunction, initial encounter    AV graft malfunction    Weakness    Malignant neoplasm of lower lobe of left lung    Malignant neoplasm of lower lobe, right bronchus or lung    Severe protein-calorie malnutrition    Acute cystitis without hematuria    Diarrhea    Metabolic encephalopathy    Acute respiratory failure     Past Medical History:   Diagnosis Date    Allergic rhinitis     Anaphylactic shock, unspecified, initial encounter 12/05/2023    Anemia     Arthritis     Asthma     USE INHALERS AND NEBULIZERS    Back pain     Bladder disorder     Cancer     LEFT LUNG CANCER 2005 SURGERY AND CHEMO DONE  AND CURRENTLY 4/ 14/22  RIGHT LUNG CANCER HAS ONLY RECEIVED RADIATION THUS FAR    Chronic kidney disease     stage 3    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 DENIES CP BUT DOES HAVE SOA CHRONIC ISSUE COPD/LUNG CANCER    COPD (chronic obstructive pulmonary disease)     FOLLOWED BY DR MARIAJOSE BAILEY    Coronary artery disease     DENIES CP BUT DOES GET SOA MOST OF THE TIME WITH EXERTION BUT OCC AT REST CHRONIC ISSUE COPD/LUNG CANCER    Deep vein thrombosis     Diabetes mellitus     DOES NOT CHECK BS DAILY    Disease of thyroid gland     HYPOTHYROIDISM    Essential hypertension     Gastric ulcer     GERD (gastroesophageal reflux disease)     Heart murmur     History of transfusion     NO ISSUES POST TRANSFUSION WAS MANY YEARS AGO    Hyperlipemia     Leukocytopenia     FOLLOWED BY DR MIR BAILEY    Limb swelling     Lumbago     Lumbar spinal stenosis     Lung cancer     Migraine headache     Multiple joint pain     On home oxygen therapy     4L/NC PRN    Osteopenia     PNA (pneumonia) 05/04/2024    Pseudomonas pneumonia 04/29/2024    Reflux esophagitis      Shortness of breath     Thyroid nodule     HAS ULTRASOUND YEARLY BEING MONITORED    Vascular disease     Vitamin D deficiency      Past Surgical History:   Procedure Laterality Date    ABDOMINAL SURGERY      ANGIOPLASTY RENAL ARTERY Left 06/14/2024    stent placement    APPENDECTOMY      ARTERIOGRAM N/A 06/14/2024    Procedure: Arteriogram, right radial access, aortogram and renal arteriogram with possible intervention.;  Surgeon: Dio Miguel MD;  Location: [facility] CATH INVASIVE LOCATION;  Service: Peripheral Vascular;  Laterality: N/A;    ARTERIOVENOUS FISTULA/SHUNT SURGERY Left 05/10/2022    Procedure: Left basilic vein transposition;  Surgeon: Wan Barksdale MD;  Location: [facility] MAIN OR;  Service: Vascular;  Laterality: Left;    ARTERIOVENOUS FISTULA/SHUNT SURGERY Left 03/23/2023    Procedure: Ligation of left arm arteriovenous fistula;  Surgeon: Wan Barksdale MD;  Location: [facility] MAIN OR;  Service: Vascular;  Laterality: Left;    ARTERIOVENOUS FISTULA/SHUNT SURGERY Left 11/30/2023    Procedure: Creation of left arm arteriovenous graft;  Surgeon: Wan Barksdale MD;  Location: [facility] MAIN OR;  Service: Vascular;  Laterality: Left;    BRONCHOSCOPY N/A 04/24/2024    Procedure: BRONCHOSCOPY WITH BAL AND WASHINGS;  Surgeon: Khalif Yanez MD;  Location: [facility] ENDOSCOPY;  Service: Pulmonary;  Laterality: N/A;  MUCUS PLUGGING    CARDIAC CATHETERIZATION  1996    CARDIAC SURGERY      CARDIAC SURGERY      fluid drained from heart    CATARACT EXTRACTION, BILATERAL  2003    COLONOSCOPY  2014    ENDOSCOPY  2016    2019    ENDOSCOPY N/A 06/25/2025    Procedure: ESOPHAGOGASTRODUODENOSCOPY;  Surgeon: Tanvi Del Valle MD;  Location: [facility] ENDOSCOPY;  Service: Gastroenterology;  Laterality: N/A;  gastritis, esophagitis    FEMORAL ARTERY STENT Bilateral     HYSTERECTOMY      LEG THROMBECTOMY/EMBOLECTOMY Left 01/29/2025    Procedure: LEFT LOWER EXTREMITY ANGIOGRAM;  Surgeon: Jaden Castañeda MD;  Location: [facility]  HYBRID OR;  Service: Vascular;  Laterality: Left;    LUNG BIOPSY Left 2005    lobectomy upper lung caner    LUNG VOLUME REDUCTION      OTHER SURGICAL HISTORY      artifical joints/limbs    REPLACEMENT TOTAL KNEE Left 2016    SHUNT O GRAM N/A 01/13/2025    Procedure: dialysis shuntogram;  Surgeon: Court Valente MD;  Location: [facility] INVASIVE LOCATION;  Service: Peripheral Vascular;  Laterality: N/A;    UPPER GASTROINTESTINAL ENDOSCOPY         SLP Recommendation and Plan             Inpatient Speech Pathology Dysphagia Evaluation        PAIN SCALE: none indicated    PRECAUTIONS/CONTRAINDICATIONS:  airborne    SUSPECTED ABUSE/NEGLECT/EXPLOITATION:  none noted    SOCIAL/PSYCHOLOGICAL NEEDS/BARRIERS:  none noted    PAST SOCIAL HISTORY:  89 year old female, nursing home resident    PRIOR FUNCTION:  on a soft diet, dysphagia during prior admission    PATIENT GOALS/EXPECTATIONS:  to continue eating orally    HISTORY:  89 year old female recently discharged from [facility] to her skilled nursing facility. She was admitted on 7/23/25 due to acute respiratory failure. Chest xray shows no new findings. She has history of esophagitis and gastritis. She was evaluated and treated for dysphagia during her last admission.     CURRENT DIET LEVEL:  regular    OBJECTIVE:    TEST ADMINISTERED:  clinical dysphagia evaluation    COGNITION/SAFETY AWARENESS:  appears appropriate for current environment however not thoroughly evaluated    BEHAVIORAL OBSERVATIONS:  awake, cooperative    ORAL MOTOR EXAM:  generalized decreased oral motor strength/rom, 4/5    VOICE QUALITY:  clear, weak    REFLEX EXAM:  deferred    POSTURE:  sitting upright in bed    FEEDING/SWALLOWING FUNCTION:  assessed with thin liquids, nectar thick liquids, pureed solids, regular and soft solids    CLINICAL OBSERVATIONS:  Nectar thick by cup and straw. Swallows completed with mild delay. Vocal quality and laryngeal sounds clear to auscultation. Thin liquid by cup and  straw. Swallow completed with mild delay. Vocal quality and laryngeal sounds clear with auscultation. Free drinking of thin liquids by straw. Throat clear with cough x1. Pureed by spoon with swallow completed with mild delay. Double swallow. Regular solids with extended chewing. Delayed swallow completed. Oral residue. Soft solids with improved mastication. Swallow completed with minimal oral residue.     DYSPHAGIA CRITERIA:  risk of aspiration    FUNCTIONAL ASSESSMENT INSTRUMENT: Patient currently scored a level 6 of 7 on Functional Communication Measures for swallowing indicating a 1-19% limitation in function.    ASSESSMENT/ PLAN OF CARE:  Pt presents with limitations, noted below, that impede patients ability to tolerate least restrictive diet safely and independently. The skills of a therapist will be required to safely and effectively implement the following treatment plan to restore maximal level of function.    PROBLEMS:  1.  Risk of aspiration, swallow delay, decreased oral motor strength/rom   LTG 1: 30 days. Patient will increase functional communication measures for swallowing to level 7 of 7 indicating a 0% limitation in function.    STG 1a: 14 days. Patient will tolerate soft solids, thin liquids with min to no s/s of aspiration.    STG 1b: 14 days. Patient will utilize compensatory strategies with min cues.    STG 1c: 14 days. Patient/family education   TREATMENT: speech therapy for dysphagia, education of strategies and tolerance of least restrictive diet.       FREQUENCY/DURATION:  daily 5 days a week    REHAB POTENTIAL:  Pt has good rehab potential.  The following limitations may influence improvement/ length of tx  medical status.    RECOMMENDATIONS:   1.   DIET: soft to chew solids, thin liquids    2.  POSITION: fully upright for all po, 30 minutes following.     3.  COMPENSATORY STRATEGIES: assist for set up, small bites and sips, meds in applesauce.     Pt/responsible party agrees with plan of  care and has been informed of all alternatives, risks and benefits.                 Anticipated Discharge Disposition (SLP): anticipate therapy at next level of care (07/24/25 0945)                                                               EDUCATION  The patient has been educated in the following areas:   Dysphagia (Swallowing Impairment).                Time Calculation:    Time Calculation- SLP       Row Name 07/24/25 0945             Time Calculation- SLP    SLP Start Time 0700  -SN      SLP Stop Time 0800  -SN      SLP Time Calculation (min) 60 min  -SN      SLP Received On 07/24/25  -SN         Untimed Charges    64316-IY Eval Oral Pharyng Swallow Minutes 60  -SN         Total Minutes    Untimed Charges Total Minutes 60  -SN       Total Minutes 60  -SN                User Key  (r) = Recorded By, (t) = Taken By, (c) = Cosigned By      Initials Name Provider Type    Denise Osorio MS-CCC/SLP, VISH Speech and Language Pathologist                    Therapy Charges for Today       Code Description Service Date Service Provider Modifiers Qty    [id number] HC ST EVAL ORAL PHARYNG SWALLOW 4 7/24/2025 Denise Crowell MS-CCC/SLP, CNT GN 1                 KAYCEE Watson/VISH BILL  7/24/2025

## 2025-07-24 NOTE — SIGNIFICANT NOTE
07/24/25 1400   Physical Therapy Time and Intention   Mode of Treatment physical therapy   Session Not Performed patient unavailable for evaluation  (dialysis)

## 2025-07-24 NOTE — NURSING NOTE
Duration of Treatment 4.0 Hours   Access Site Tunneled Dialysis Catheter   Dialyzer Revaclear    mL/min   Dialysate Temperature (C) 36   BFR-As tolerated to a maximum of: 400 mL/min   Dialysate Solution Bath: K+ = 3 mEq, CA = 2.5mg   Bicarb 35 mEq   Na+ 137 meq   Fluid Removal: 3L     Patient completed 2hrs 24minutes of treatment. C/O not feeling well and soa, on O2 4 L and checked O2, she was 97  Ended treatment per patient request and ask respiratory to meet her in her room for a prn breathing treatment.  Removed 1700,ls UF with BVP of 52.8  Reported off to floor nurse

## 2025-07-24 NOTE — PROGRESS NOTES
[facility]   Hospitalist Progress Note  Date: 2025  Patient Name: Charlette Rai  : 1935  MRN: [id number]  Date of admission: 2025  Consultants:   - Nephrology: Dr. Elliott Aviles    Subjective   Subjective     Chief Complaint: Shortness of breath    Summary:   Charlette Rai is a 89 y.o. female with PAD, renal artery stenosis s/p stenting, hypertension, ESRD on hemodialysis, chronic diastolic heart failure, type 2 diabetes mellitus and hypothyroidism presented with complaints of shortness of breath.  CXR obtained and no acute abnormalities were noted.  Patient placed on supplemental O2 to maintain sats.  CT abdomen pelvis did show right lower lobe pneumonia.  Antibiotic started.  Nephrology consulted.    Interval Followup:   Patient seen while in dialysis.  Has complaints of just not feeling well and generalized weakness/fatigue.  O2 sats appear to be good on supplemental O2.    Antibiotics:   - Unasyn    Objective   Objective     Vitals:   Temp:  [94.6 °F (34.8 °C)-98.2 °F (36.8 °C)] 97.5 °F (36.4 °C)  Heart Rate:  [] 89  Resp:  [12-28] 18  BP: (134-212)/() 168/75  Flow (L/min) (Oxygen Therapy):  [2-5] 4  Physical Exam   Gen: Frail chronically ill-appearing female, lying in bed on dialysis  Resp: Diminished breath sounds, equal chest rise bilaterally  Card: RRR, No m/r/g  Abd: Soft, Nontender, Nondistended, + bowel sounds    Result Review    Result Review:  I have personally reviewed the results as below and agree with these findings:  []  Laboratory:   CMP          2025    04:38 2025    19:30 2025    05:10   CMP   Glucose 61  129  143    BUN 34.1  25.1  29.0    Creatinine 2.35  2.61  2.48    EGFR 19.5  17.2  18.3    Sodium 131  143  140    Potassium 4.5  4.5  4.9    Chloride 102  107  109    Calcium 7.8  8.1  8.1    Total Protein 5.1  6.1     Albumin 2.1  2.9     Globulin 3.0  3.2     Total Bilirubin 0.3  0.5     Alkaline Phosphatase 257  335     AST (SGOT) 57  69      ALT (SGPT) 43  90     Albumin/Globulin Ratio 0.7  0.9     BUN/Creatinine Ratio 14.5  9.6  11.7    Anion Gap 10.6  10.7  11.7      CBC          7/18/2025    04:38 7/23/2025    10:01 7/23/2025    19:30 7/24/2025    05:10   CBC   WBC 8.30   5.90  6.26    RBC 2.29   3.73  3.89    Hemoglobin 7.3  7.9  11.9  12.3    Hematocrit 26.0  27.4  39.3  41.1    .5   105.4  105.7    MCH 31.9   31.9  31.6    MCHC 28.1   30.3  29.9    RDW 19.6   22.0  21.8    Platelets 134   160  139    Magnesium within normal limits.  [x]  Microbiology: Blood culture (07/24/2025): Pending.  [x]  Radiology:   [x]  EKG/Telemetry:    []  Cardiology/Vascular:    []  Pathology:  []  Old records:  []  Other:    Assessment & Plan   Assessment / Plan     Assessment:  Community-acquired pneumonia due to unknown infectious organism  Shortness of breath secondary to above and likely volume overload  ESRD on hemodialysis  Generalized weakness  Chronic hypoxic respiratory failure  Anemia of chronic disease  Hypertension  Chronic diastolic heart failure, not acutely exacerbated  Severe protein calorie malnutrition  History of renal artery stenosis  Hypothyroidism    Plan:  -Nephrology consulted and following, appreciate assistance and recommendations in the care of this patient.  -Antibiotic treatment with Unasyn.  Tailor based on results of infectious workup  -CT chest ordered  -Continue supplemental O2 to maintain sats greater than 90%, wean as tolerated  -Management to assist with nephrology.  Plan for dialysis today and tomorrow.  -Start appropriate home medications  -Continue Brovana and Pulmicort  -Continue IV Solu-Medrol  -Continue JUAN nebs (home medication)  -PT/OT consulted  -Will monitor electrolytes and renal function with BMP and magnesium level in the AM  -Will monitor WBC and Hgb with CBC in the AM  -Clinical course will dictate further management     DVT Prophylaxis: SCDs, heparin  GI Prophylaxis: Pantoprazole  Diet:   Diet Order    Procedures    Diet: Renal; Low Sodium (2-3g), Low Potassium, Low Phosphorus; Texture: Soft to Chew (NDD 3); Soft to Chew: Whole Meat; Fluid Consistency: Thin (IDDSI 0)     Dispo: PT/OT consulted     Time spent personally reviewing patient's chart, labs and imaging, evaluating/examining the patient, discussing care plan with patient and nurse at bedside and discussing management with consultants (Nephrology): 52 minutes.     Part of this note may be an electronic transcription/translation of spoken language to printed text using the Dragon dictation system.    VTE Prophylaxis:  Pharmacologic & mechanical VTE prophylaxis orders are present.        CODE STATUS:   Code Status (Patient has no pulse and is not breathing): No CPR (Do Not Attempt to Resuscitate)  Medical Interventions (Patient has pulse or is breathing): Limited Support  Medical Intervention Limits: No intubation (DNI)        Electronically signed by Pieter Salgado MD, 7/24/2025, 12:41 EDT.

## 2025-07-24 NOTE — CONSULTS
Patient Care Team:  Brennan Mosqueda MD as PCP - General (Internal Medicine)  Regis Mckeon MD as Consulting Physician (Radiation Oncology)  Carlton Casillas MD as Consulting Physician (Gastroenterology)  Susan Marcos APRN as Nurse Practitioner (Gastroenterology)    Chief complaint: ESRD    Consults   Subjective     History of Present Illness  89yo with h/o ESRD presented to ER with increased dyspnea with PRBC tranfusion.  She was admitted for further evaluation and we were asked to see her for dialysis today.      Review of Systems   Constitutional:  Positive for fatigue. Negative for chills and fever.   Respiratory:  Positive for shortness of breath. Negative for cough.    Cardiovascular:  Negative for chest pain and leg swelling.   Gastrointestinal:  Negative for abdominal pain, diarrhea, nausea and vomiting.   Genitourinary:  Negative for dysuria.   Musculoskeletal:  Negative for back pain.   Neurological:  Negative for headaches.   Psychiatric/Behavioral:  Negative for confusion.         Past Medical History:   Diagnosis Date    Allergic rhinitis     Anaphylactic shock, unspecified, initial encounter 12/05/2023    Anemia     Arthritis     Asthma     USE INHALERS AND NEBULIZERS    Back pain     Bladder disorder     Cancer     LEFT LUNG CANCER 2005 SURGERY AND CHEMO DONE  AND CURRENTLY 4/ 14/22  RIGHT LUNG CANCER HAS ONLY RECEIVED RADIATION THUS FAR    Chronic kidney disease     stage 3    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 DENIES CP BUT DOES HAVE SOA CHRONIC ISSUE COPD/LUNG CANCER    COPD (chronic obstructive pulmonary disease)     FOLLOWED BY DR MARIAJOSE BAILEY    Coronary artery disease     DENIES CP BUT DOES GET SOA MOST OF THE TIME WITH EXERTION BUT OCC AT REST CHRONIC ISSUE COPD/LUNG CANCER    Deep vein thrombosis      Diabetes mellitus     DOES NOT CHECK BS DAILY    Disease of thyroid gland     HYPOTHYROIDISM    Essential hypertension     Gastric ulcer     GERD (gastroesophageal reflux disease)     Heart murmur     History of transfusion     NO ISSUES POST TRANSFUSION WAS MANY YEARS AGO    Hyperlipemia     Leukocytopenia     FOLLOWED BY DR MIR BAILEY    Limb swelling     Lumbago     Lumbar spinal stenosis     Lung cancer     Migraine headache     Multiple joint pain     On home oxygen therapy     4L/NC PRN    Osteopenia     PNA (pneumonia) 05/04/2024    Pseudomonas pneumonia 04/29/2024    Reflux esophagitis     Shortness of breath     Thyroid nodule     HAS ULTRASOUND YEARLY BEING MONITORED    Vascular disease     Vitamin D deficiency    ,   Past Surgical History:   Procedure Laterality Date    ABDOMINAL SURGERY      ANGIOPLASTY RENAL ARTERY Left 06/14/2024    stent placement    APPENDECTOMY      ARTERIOGRAM N/A 06/14/2024    Procedure: Arteriogram, right radial access, aortogram and renal arteriogram with possible intervention.;  Surgeon: Dio Miguel MD;  Location: [facility] CATH INVASIVE LOCATION;  Service: Peripheral Vascular;  Laterality: N/A;    ARTERIOVENOUS FISTULA/SHUNT SURGERY Left 05/10/2022    Procedure: Left basilic vein transposition;  Surgeon: Wan Barksdale MD;  Location: [facility] MAIN OR;  Service: Vascular;  Laterality: Left;    ARTERIOVENOUS FISTULA/SHUNT SURGERY Left 03/23/2023    Procedure: Ligation of left arm arteriovenous fistula;  Surgeon: Wan Barksdale MD;  Location: [facility] MAIN OR;  Service: Vascular;  Laterality: Left;    ARTERIOVENOUS FISTULA/SHUNT SURGERY Left 11/30/2023    Procedure: Creation of left arm arteriovenous graft;  Surgeon: Wan Barksdale MD;  Location: [facility] MAIN OR;  Service: Vascular;  Laterality: Left;    BRONCHOSCOPY N/A 04/24/2024    Procedure: BRONCHOSCOPY WITH BAL AND WASHINGS;  Surgeon: Khalif Yanez MD;  Location: [facility] ENDOSCOPY;  Service: Pulmonary;  Laterality: N/A;   MUCUS PLUGGING    CARDIAC CATHETERIZATION      CARDIAC SURGERY      CARDIAC SURGERY      fluid drained from heart    CATARACT EXTRACTION, BILATERAL      COLONOSCOPY      ENDOSCOPY  2016    ENDOSCOPY N/A 2025    Procedure: ESOPHAGOGASTRODUODENOSCOPY;  Surgeon: Tanvi Del Valle MD;  Location: [facility] ENDOSCOPY;  Service: Gastroenterology;  Laterality: N/A;  gastritis, esophagitis    FEMORAL ARTERY STENT Bilateral     HYSTERECTOMY      LEG THROMBECTOMY/EMBOLECTOMY Left 2025    Procedure: LEFT LOWER EXTREMITY ANGIOGRAM;  Surgeon: Jaden Castañeda MD;  Location: [facility] HYBRID OR;  Service: Vascular;  Laterality: Left;    LUNG BIOPSY Left 2005    lobectomy upper lung caner    LUNG VOLUME REDUCTION      OTHER SURGICAL HISTORY      artifical joints/limbs    REPLACEMENT TOTAL KNEE Left     SHUNT O GRAM N/A 2025    Procedure: dialysis shuntogram;  Surgeon: Court Valente MD;  Location: [facility] CATH INVASIVE LOCATION;  Service: Peripheral Vascular;  Laterality: N/A;    UPPER GASTROINTESTINAL ENDOSCOPY     ,   Family History   Problem Relation Age of Onset    Arthritis Mother     Arthritis Father     Cancer Brother     Diabetes Brother     Other Brother         blood disease     Prostate cancer Brother     Cancer Brother     Prostate cancer Brother     Cancer Brother     Cancer Brother     Malig Hyperthermia Neg Hx     Colon cancer Neg Hx    ,   Social History     Socioeconomic History    Marital status:    Tobacco Use    Smoking status: Former     Current packs/day: 0.00     Average packs/day: 1 pack/day for 52.5 years (52.5 ttl pk-yrs)     Types: Cigarettes     Start date:      Quit date: 2004     Years since quittin.0     Passive exposure: Past    Smokeless tobacco: Never   Vaping Use    Vaping status: Never Used   Substance and Sexual Activity    Alcohol use: Not Currently     Comment: beer many years ago    Drug use: Never    Sexual activity: Defer      E-cigarette/Vaping    E-cigarette/Vaping Use Never User      E-cigarette/Vaping Substances    Nicotine No     THC No     CBD No     Flavoring No      E-cigarette/Vaping Devices    Disposable No     Pre-filled or Refillable Cartridge No     Refillable Tank No     Pre-filled Pod No          ,   Medications Prior to Admission   Medication Sig Dispense Refill Last Dose/Taking    acetaminophen (TYLENOL) 325 MG tablet Take 2 tablets by mouth Every 6 (Six) Hours As Needed for Mild Pain.       albuterol sulfate  (90 Base) MCG/ACT inhaler Inhale 2 puffs Every 4 (Four) Hours As Needed for Wheezing. 18 g 11     aspirin 81 MG EC tablet Take 1 tablet by mouth Daily.       budesonide (Pulmicort) 0.5 MG/2ML nebulizer solution Take 2 mL by nebulization 2 (Two) Times a Day. 60 each 5     carvedilol (COREG) 6.25 MG tablet Take 1 tablet by mouth 2 (Two) Times a Day With Meals. 60 tablet 0     clopidogrel (Plavix) 75 MG tablet Take 1 tablet by mouth Daily. 90 tablet 3     dexlansoprazole (DEXILANT) 60 MG capsule Take 1 capsule by mouth Daily. 90 capsule 3     ferrous gluconate (FERGON) 324 MG tablet Take 1 tablet by mouth 3 (Three) Times a Week. 60 tablet 1     fluticasone (FLONASE) 50 MCG/ACT nasal spray Administer 1 spray into the nostril(s) as directed by provider Daily.       furosemide (LASIX) 20 MG tablet Take 1 tablet by mouth Daily. 90 tablet 3     ipratropium-albuterol (DUO-NEB) 0.5-2.5 mg/3 ml nebulizer Take 3 mL by nebulization Every 6 (Six) Hours As Needed for Wheezing. 360 mL 2     isosorbide mononitrate (IMDUR) 30 MG 24 hr tablet Take 1 tablet by mouth Every Night. 90 tablet 3     levothyroxine (SYNTHROID, LEVOTHROID) 50 MCG tablet Take 1 tablet by mouth Daily.       lidocaine-prilocaine (EMLA) 2.5-2.5 % cream Apply 1 Application topically to the appropriate area as directed Take As Directed. Apply to dialysis access site 30-45 minutes prior to dialysis       losartan (COZAAR) 25 MG tablet Take 1 tablet by  mouth Daily. 30 tablet 0     Methoxy PEG-Epoetin Beta (MIRCERA IJ) 200 mcg Every 14 (Fourteen) Days.       rosuvastatin (CRESTOR) 20 MG tablet Take 0.5 tablets by mouth Every Night. 90 tablet 3     tobramycin PF (JUAN) 300 MG/5ML nebulizer solution Take 5 mL by nebulization 2 (Two) Times a Day. 30 days on 30 days off 300 mL 6     vitamin D (ERGOCALCIFEROL) 1.25 MG (28373 UT) capsule capsule Take 1 capsule by mouth Every 14 (Fourteen) Days. (Patient taking differently: Take 1 capsule by mouth Every 14 (Fourteen) Days. The 15th and 30th of the month) 6 capsule 3    , Scheduled Meds:  arformoterol, 15 mcg, Nebulization, BID - RT  aspirin, 81 mg, Oral, Daily  budesonide, 0.5 mg, Nebulization, BID - RT  capsaicin, 1 Application, Topical, Q8H  carvedilol, 6.25 mg, Oral, BID With Meals  clopidogrel, 75 mg, Oral, Daily  ferrous sulfate, 325 mg, Oral, Daily With Breakfast  fluticasone, 1 spray, Nasal, Daily  isosorbide mononitrate, 30 mg, Oral, Nightly  levothyroxine, 50 mcg, Oral, QAM AC  losartan, 25 mg, Oral, Q24H  melatonin, 5 mg, Oral, Nightly  methylPREDNISolone sodium succinate, 40 mg, Intravenous, Q12H  pantoprazole, 40 mg, Oral, Q AM  rosuvastatin, 10 mg, Oral, Nightly  sodium chloride, 10 mL, Intravenous, Q12H  tobramycin PF, 300 mg, Nebulization, BID - RT  trolamine salicylate, 1 Application, Topical, Q8H   , Continuous Infusions:  Pharmacy to dose vancomycin,   Pharmacy To Dose:,    , PRN Meds:    acetaminophen    senna-docusate sodium **AND** polyethylene glycol **AND** bisacodyl **AND** bisacodyl    ipratropium-albuterol    ondansetron ODT **OR** ondansetron    Pharmacy to dose vancomycin    Pharmacy To Dose:    sodium chloride    sodium chloride, and Allergies:  Patient has no known allergies.    Objective     Vital Signs  Temp:  [94.6 °F (34.8 °C)-98.2 °F (36.8 °C)] 95.9 °F (35.5 °C)  Heart Rate:  [] 75  Resp:  [12-28] 16  BP: (134-212)/() 169/56    No intake/output data recorded.  No  "intake/output data recorded.    Physical Exam  Constitutional:       Appearance: Normal appearance.   HENT:      Nose: Nose normal.      Mouth/Throat:      Mouth: Mucous membranes are moist.   Eyes:      General: No scleral icterus.     Pupils: Pupils are equal, round, and reactive to light.   Cardiovascular:      Rate and Rhythm: Normal rate and regular rhythm.      Pulses: Normal pulses.      Heart sounds: Normal heart sounds.   Pulmonary:      Effort: Pulmonary effort is normal.      Breath sounds: Normal breath sounds.   Abdominal:      General: Abdomen is flat.      Palpations: Abdomen is soft.   Musculoskeletal:      Cervical back: Normal range of motion.      Right lower leg: No edema.      Left lower leg: No edema.   Neurological:      General: No focal deficit present.      Mental Status: She is alert and oriented to person, place, and time.         Results Review:    I reviewed the patient's new clinical results.  I reviewed the patient's new imaging results and agree with the interpretation.  I reviewed the patient's other test results and agree with the interpretation    WBC WBC   Date Value Ref Range Status   07/24/2025 6.26 3.40 - 10.80 10*3/mm3 Final   07/23/2025 5.90 3.40 - 10.80 10*3/mm3 Final      HGB Hemoglobin   Date Value Ref Range Status   07/24/2025 12.3 12.0 - 15.9 g/dL Final   07/23/2025 11.9 (L) 12.0 - 15.9 g/dL Final   07/23/2025 7.9 (L) 12.0 - 15.9 g/dL Final      HCT Hematocrit   Date Value Ref Range Status   07/24/2025 41.1 34.0 - 46.6 % Final   07/23/2025 39.3 34.0 - 46.6 % Final   07/23/2025 27.4 (L) 34.0 - 46.6 % Final      Platlets No results found for: \"LABPLAT\"   MCV MCV   Date Value Ref Range Status   07/24/2025 105.7 (H) 79.0 - 97.0 fL Final   07/23/2025 105.4 (H) 79.0 - 97.0 fL Final          Sodium Sodium   Date Value Ref Range Status   07/24/2025 140 136 - 145 mmol/L Final   07/23/2025 143 136 - 145 mmol/L Final      Potassium Potassium   Date Value Ref Range Status " "  07/24/2025 4.9 3.5 - 5.2 mmol/L Final   07/23/2025 4.5 3.5 - 5.2 mmol/L Final      Chloride Chloride   Date Value Ref Range Status   07/24/2025 109 (H) 98 - 107 mmol/L Final   07/23/2025 107 98 - 107 mmol/L Final      CO2 CO2   Date Value Ref Range Status   07/24/2025 19.3 (L) 22.0 - 29.0 mmol/L Final   07/23/2025 25.3 22.0 - 29.0 mmol/L Final      BUN BUN   Date Value Ref Range Status   07/24/2025 29.0 (H) 8.0 - 23.0 mg/dL Final   07/23/2025 25.1 (H) 8.0 - 23.0 mg/dL Final      Creatinine Creatinine   Date Value Ref Range Status   07/24/2025 2.48 (H) 0.57 - 1.00 mg/dL Final   07/23/2025 2.61 (H) 0.57 - 1.00 mg/dL Final      Calcium Calcium   Date Value Ref Range Status   07/24/2025 8.1 (L) 8.6 - 10.5 mg/dL Final   07/23/2025 8.1 (L) 8.6 - 10.5 mg/dL Final      PO4 No results found for: \"CAPO4\"   Albumin Albumin   Date Value Ref Range Status   07/23/2025 2.9 (L) 3.5 - 5.2 g/dL Final      Magnesium Magnesium   Date Value Ref Range Status   07/24/2025 1.9 1.6 - 2.4 mg/dL Final   07/23/2025 2.4 1.6 - 2.4 mg/dL Final      Uric Acid No results found for: \"URICACID\"         Assessment & Plan       Acute respiratory failure      Assessment & Plan  - ESRD, dialysis through right IJ TDC.  Was at [facility] receiving daily dialysis.  Here dialysis MWF schedule. Dialysis today and again tomorrow.     - Severe anemia, and recent GI bleed.  Elevated ferritin.  Will continue Epogen with dialysis.  Status post recent EGD and transfusion.      - Secondary hyperparathyroidism and hyperphosphatemia, being addressed with dialysis.  Low phosphorus diet.       - Severe peripheral arterial disease including lower extremities and renal artery stenosis, status post PTA and stent 6/14/2024.       - Lung cancer.  Status post radiation.     - Type 2 diabetes with complications, per primary.     I discussed the patients findings and my recommendations with patient, nursing staff, and primary care team    Elliott Aviles, " MD  07/24/25  09:17 EDT

## 2025-07-24 NOTE — PLAN OF CARE
Goal Outcome Evaluation:  Plan of Care Reviewed With: patient        Progress: no change  Outcome Evaluation: Hypertensive, given meds. Given one time dose and morphine and IV tylenol for severe right back pain. Temperature 94.6, blanketrol applied. CT abd completed, see results.

## 2025-07-24 NOTE — SIGNIFICANT NOTE
Wound Eval / Progress Noted    MAURICE Escamilla     Patient Name: Charlette Rai  : 1935  MRN: [id number]  Today's Date: 2025                 Admit Date: 2025    Visit Dx:    ICD-10-CM ICD-9-CM   1. Acute respiratory failure, unspecified whether with hypoxia or hypercapnia  J96.00 518.81   2. 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 DENIES CP BUT DOES HAVE SOA CHRONIC ISSUE COPD/LUNG CANCER    COPD (chronic obstructive pulmonary disease)     FOLLOWED BY DR MARIAJOSE BAILEY    Coronary artery disease     DENIES CP BUT DOES GET SOA MOST OF THE TIME WITH EXERTION BUT OCC AT REST CHRONIC ISSUE COPD/LUNG CANCER    Deep vein thrombosis     Diabetes mellitus     DOES NOT CHECK BS DAILY    Disease of thyroid gland     HYPOTHYROIDISM    Essential hypertension     Gastric ulcer     GERD (gastroesophageal reflux disease)     Heart murmur     History of transfusion     NO ISSUES POST TRANSFUSION WAS MANY YEARS AGO    Hyperlipemia     Leukocytopenia     FOLLOWED BY DR MIR BAILEY    Limb swelling     Lumbago     Lumbar spinal stenosis     Lung cancer     Migraine headache     Multiple joint pain     On home oxygen therapy     4L/NC PRN    Osteopenia     PNA (pneumonia) 2024    Pseudomonas pneumonia 2024    Reflux esophagitis     Shortness of breath     Thyroid nodule      HAS ULTRASOUND YEARLY BEING MONITORED    Vascular disease     Vitamin D deficiency         Past Surgical History:   Procedure Laterality Date    ABDOMINAL SURGERY      ANGIOPLASTY RENAL ARTERY Left 06/14/2024    stent placement    APPENDECTOMY      ARTERIOGRAM N/A 06/14/2024    Procedure: Arteriogram, right radial access, aortogram and renal arteriogram with possible intervention.;  Surgeon: Dio Miguel MD;  Location: [facility] CATH INVASIVE LOCATION;  Service: Peripheral Vascular;  Laterality: N/A;    ARTERIOVENOUS FISTULA/SHUNT SURGERY Left 05/10/2022    Procedure: Left basilic vein transposition;  Surgeon: Wan Barksdale MD;  Location: [facility] MAIN OR;  Service: Vascular;  Laterality: Left;    ARTERIOVENOUS FISTULA/SHUNT SURGERY Left 03/23/2023    Procedure: Ligation of left arm arteriovenous fistula;  Surgeon: Wan Barksdale MD;  Location: [facility] MAIN OR;  Service: Vascular;  Laterality: Left;    ARTERIOVENOUS FISTULA/SHUNT SURGERY Left 11/30/2023    Procedure: Creation of left arm arteriovenous graft;  Surgeon: Wan Barksdale MD;  Location: [facility] MAIN OR;  Service: Vascular;  Laterality: Left;    BRONCHOSCOPY N/A 04/24/2024    Procedure: BRONCHOSCOPY WITH BAL AND WASHINGS;  Surgeon: Khalif Yanez MD;  Location: [facility] ENDOSCOPY;  Service: Pulmonary;  Laterality: N/A;  MUCUS PLUGGING    CARDIAC CATHETERIZATION  1996    CARDIAC SURGERY      CARDIAC SURGERY      fluid drained from heart    CATARACT EXTRACTION, BILATERAL  2003    COLONOSCOPY  2014    ENDOSCOPY  2016    2019    ENDOSCOPY N/A 06/25/2025    Procedure: ESOPHAGOGASTRODUODENOSCOPY;  Surgeon: Tanvi Del Valle MD;  Location: [facility] ENDOSCOPY;  Service: Gastroenterology;  Laterality: N/A;  gastritis, esophagitis    FEMORAL ARTERY STENT Bilateral     HYSTERECTOMY      LEG THROMBECTOMY/EMBOLECTOMY Left 01/29/2025    Procedure: LEFT LOWER EXTREMITY ANGIOGRAM;  Surgeon: Jaden Castañeda MD;  Location: [facility] HYBRID OR;  Service: Vascular;  Laterality:  Left;    LUNG BIOPSY Left 2005    lobectomy upper lung caner    LUNG VOLUME REDUCTION      OTHER SURGICAL HISTORY      artifical joints/limbs    REPLACEMENT TOTAL KNEE Left 2016    SHUNT O GRAM N/A 01/13/2025    Procedure: dialysis shuntogram;  Surgeon: Court Valente MD;  Location: [facility] INVASIVE LOCATION;  Service: Peripheral Vascular;  Laterality: N/A;    UPPER GASTROINTESTINAL ENDOSCOPY           Physical Assessment:  Wound 07/24/25 1220 Right posterior heel Pressure Injury (Active)   Wound Image   07/24/25 1030   Pressure Injury Stage DTPI 07/24/25 1030   Dressing Appearance open to air 07/24/25 1030   Closure None 07/24/25 1030   Base nonblanchable;maroon/purple;dry 07/24/25 1030   Periwound intact;dry;pink 07/24/25 1030   Periwound Temperature warm 07/24/25 1030   Periwound Skin Turgor soft 07/24/25 1030   Edges rolled/closed 07/24/25 1030   Drainage Amount none 07/24/25 1030   Care, Wound cleansed with;sterile normal saline 07/24/25 1030   Dressing Care dressing applied;non-adherent;petroleum-based;gauze;silicone border foam 07/24/25 1030   Periwound Care absorptive dressing applied 07/24/25 1030       Wound 07/15/25 1213 Bilateral gluteal Irritant Contact (Active)     Dressing Appearance intact;dry 07/24/25 1030   Dressing Removed Type silicone border foam 07/24/25 1030   Closure None 07/24/25 1030   Base closed/resurfaced;pink;dry;epithelialization 07/24/25 1030   Periwound intact;dry;pink 07/24/25 1030   Periwound Temperature warm 07/24/25 1030   Periwound Skin Turgor soft 07/24/25 1030   Edges rolled/closed 07/24/25 1030   Drainage Amount none 07/24/25 1030   Care, Wound cleansed with;sterile normal saline 07/24/25 1030   Dressing Care open to air 07/24/25 1030        Wound Check / Follow-up:  Patient was seen today for a wound consult. Patient is awake and alert at the time of the assessment; patient was agreeable to the visit.    Patient has areas of friction and shearing to the buttocks, there  is pink epithelium scattered. No open wound bases present. Recommending the application of blue top moisture barrier TID / PRN and leave open to air. Implement Q2H turns and offload at all times. Keep the patient clean and free from all moisture.    Patient does have a DTPI to the right heel. Tissue is soft and non-blanchable, maroon / purple in discoloration. Recommending every other day dressing changes with non-adherent petroleum gauze to the tissue. Offload heels at all times.    No other skin issues identified at this time.     Impression: DTPI to the right heel, friction / shearing to the buttocks    Short term goals:  regain skin integrity, skin protection, topical treatment, pressure reduction, moisture prevention, every other day dressing changes, quality skin care and hygiene.     Hamida Gonzalez RN    7/24/2025    12:26 EDT

## 2025-07-25 LAB
ANION GAP SERPL CALCULATED.3IONS-SCNC: 10.1 MMOL/L (ref 5–15)
BUN SERPL-MCNC: 28.7 MG/DL (ref 8–23)
BUN/CREAT SERPL: 13.3 (ref 7–25)
CALCIUM SPEC-SCNC: 7.8 MG/DL (ref 8.6–10.5)
CHLORIDE SERPL-SCNC: 104 MMOL/L (ref 98–107)
CO2 SERPL-SCNC: 23.9 MMOL/L (ref 22–29)
CREAT SERPL-MCNC: 2.16 MG/DL (ref 0.57–1)
DEPRECATED RDW RBC AUTO: 80.7 FL (ref 37–54)
EGFRCR SERPLBLD CKD-EPI 2021: 21.5 ML/MIN/1.73
ERYTHROCYTE [DISTWIDTH] IN BLOOD BY AUTOMATED COUNT: 21.1 % (ref 12.3–15.4)
GLUCOSE SERPL-MCNC: 178 MG/DL (ref 65–99)
HCT VFR BLD AUTO: 37.6 % (ref 34–46.6)
HGB BLD-MCNC: 11.5 G/DL (ref 12–15.9)
MAGNESIUM SERPL-MCNC: 2 MG/DL (ref 1.6–2.4)
MCH RBC QN AUTO: 32 PG (ref 26.6–33)
MCHC RBC AUTO-ENTMCNC: 30.6 G/DL (ref 31.5–35.7)
MCV RBC AUTO: 104.7 FL (ref 79–97)
PHOSPHATE SERPL-MCNC: 4.1 MG/DL (ref 2.5–4.5)
PLATELET # BLD AUTO: 133 10*3/MM3 (ref 140–450)
PMV BLD AUTO: 9.6 FL (ref 6–12)
POTASSIUM SERPL-SCNC: 4.1 MMOL/L (ref 3.5–5.2)
RBC # BLD AUTO: 3.59 10*6/MM3 (ref 3.77–5.28)
SODIUM SERPL-SCNC: 138 MMOL/L (ref 136–145)
WBC NRBC COR # BLD AUTO: 5.49 10*3/MM3 (ref 3.4–10.8)

## 2025-07-25 PROCEDURE — 94664 DEMO&/EVAL PT USE INHALER: CPT

## 2025-07-25 PROCEDURE — 99223 1ST HOSP IP/OBS HIGH 75: CPT | Performed by: INTERNAL MEDICINE

## 2025-07-25 PROCEDURE — 97165 OT EVAL LOW COMPLEX 30 MIN: CPT

## 2025-07-25 PROCEDURE — 92526 ORAL FUNCTION THERAPY: CPT

## 2025-07-25 PROCEDURE — 99233 SBSQ HOSP IP/OBS HIGH 50: CPT | Performed by: INTERNAL MEDICINE

## 2025-07-25 PROCEDURE — 85027 COMPLETE CBC AUTOMATED: CPT | Performed by: INTERNAL MEDICINE

## 2025-07-25 PROCEDURE — 94799 UNLISTED PULMONARY SVC/PX: CPT

## 2025-07-25 PROCEDURE — 25010000002 AMPICILLIN-SULBACTAM PER 1.5 G: Performed by: INTERNAL MEDICINE

## 2025-07-25 PROCEDURE — 83735 ASSAY OF MAGNESIUM: CPT | Performed by: INTERNAL MEDICINE

## 2025-07-25 PROCEDURE — 25010000002 METHYLPREDNISOLONE PER 40 MG: Performed by: HOSPITALIST

## 2025-07-25 PROCEDURE — 84100 ASSAY OF PHOSPHORUS: CPT | Performed by: INTERNAL MEDICINE

## 2025-07-25 PROCEDURE — 97161 PT EVAL LOW COMPLEX 20 MIN: CPT

## 2025-07-25 PROCEDURE — 25010000002 HEPARIN (PORCINE) PER 1000 UNITS: Performed by: INTERNAL MEDICINE

## 2025-07-25 PROCEDURE — 80048 BASIC METABOLIC PNL TOTAL CA: CPT | Performed by: INTERNAL MEDICINE

## 2025-07-25 RX ORDER — LOSARTAN POTASSIUM 50 MG/1
50 TABLET ORAL
Status: DISCONTINUED | OUTPATIENT
Start: 2025-07-26 | End: 2025-07-28 | Stop reason: HOSPADM

## 2025-07-25 RX ORDER — LIDOCAINE 4 G/G
1 PATCH TOPICAL
Status: DISCONTINUED | OUTPATIENT
Start: 2025-07-25 | End: 2025-07-28 | Stop reason: HOSPADM

## 2025-07-25 RX ADMIN — HEPARIN SODIUM 5000 UNITS: 5000 INJECTION INTRAVENOUS; SUBCUTANEOUS at 22:12

## 2025-07-25 RX ADMIN — TROLAMINE SALICYLATE 1 APPLICATION: 100 CREAM TOPICAL at 22:14

## 2025-07-25 RX ADMIN — BUDESONIDE 0.5 MG: 0.5 SUSPENSION RESPIRATORY (INHALATION) at 20:58

## 2025-07-25 RX ADMIN — ASPIRIN 81 MG: 81 TABLET, COATED ORAL at 08:01

## 2025-07-25 RX ADMIN — CAPSAICIN 1 APPLICATION: 0.75 CREAM TOPICAL at 14:05

## 2025-07-25 RX ADMIN — TOBRAMYCIN 300 MG: 300 SOLUTION RESPIRATORY (INHALATION) at 09:20

## 2025-07-25 RX ADMIN — IPRATROPIUM BROMIDE AND ALBUTEROL SULFATE 3 ML: .5; 3 SOLUTION RESPIRATORY (INHALATION) at 18:16

## 2025-07-25 RX ADMIN — Medication 10 ML: at 22:15

## 2025-07-25 RX ADMIN — AMPICILLIN SODIUM AND SULBACTAM SODIUM 3 G: 2; 1 INJECTION, POWDER, FOR SOLUTION INTRAMUSCULAR; INTRAVENOUS at 16:37

## 2025-07-25 RX ADMIN — ARFORMOTEROL TARTRATE 15 MCG: 15 SOLUTION RESPIRATORY (INHALATION) at 09:20

## 2025-07-25 RX ADMIN — HEPARIN SODIUM 3200 UNITS: 1000 INJECTION INTRAVENOUS; SUBCUTANEOUS at 12:25

## 2025-07-25 RX ADMIN — TOBRAMYCIN 300 MG: 300 SOLUTION RESPIRATORY (INHALATION) at 20:58

## 2025-07-25 RX ADMIN — BUDESONIDE 0.5 MG: 0.5 SUSPENSION RESPIRATORY (INHALATION) at 09:20

## 2025-07-25 RX ADMIN — HEPARIN SODIUM 5000 UNITS: 5000 INJECTION INTRAVENOUS; SUBCUTANEOUS at 06:34

## 2025-07-25 RX ADMIN — Medication 5 MG: at 22:13

## 2025-07-25 RX ADMIN — TROLAMINE SALICYLATE 1 APPLICATION: 100 CREAM TOPICAL at 06:34

## 2025-07-25 RX ADMIN — FERROUS SULFATE TAB 325 MG (65 MG ELEMENTAL FE) 325 MG: 325 (65 FE) TAB at 08:01

## 2025-07-25 RX ADMIN — CAPSAICIN 1 APPLICATION: 0.75 CREAM TOPICAL at 22:14

## 2025-07-25 RX ADMIN — ACETAMINOPHEN 650 MG: 325 TABLET ORAL at 10:46

## 2025-07-25 RX ADMIN — LOSARTAN POTASSIUM 25 MG: 25 TABLET, FILM COATED ORAL at 08:01

## 2025-07-25 RX ADMIN — IPRATROPIUM BROMIDE AND ALBUTEROL SULFATE 3 ML: .5; 3 SOLUTION RESPIRATORY (INHALATION) at 09:20

## 2025-07-25 RX ADMIN — CLOPIDOGREL BISULFATE 75 MG: 75 TABLET, FILM COATED ORAL at 08:01

## 2025-07-25 RX ADMIN — ISOSORBIDE MONONITRATE 30 MG: 30 TABLET, EXTENDED RELEASE ORAL at 22:13

## 2025-07-25 RX ADMIN — LIDOCAINE 1 PATCH: 4 PATCH TOPICAL at 11:52

## 2025-07-25 RX ADMIN — PANTOPRAZOLE SODIUM 40 MG: 40 TABLET, DELAYED RELEASE ORAL at 06:34

## 2025-07-25 RX ADMIN — ACETAMINOPHEN 650 MG: 325 TABLET ORAL at 16:37

## 2025-07-25 RX ADMIN — IPRATROPIUM BROMIDE AND ALBUTEROL SULFATE 3 ML: .5; 3 SOLUTION RESPIRATORY (INHALATION) at 13:41

## 2025-07-25 RX ADMIN — ARFORMOTEROL TARTRATE 15 MCG: 15 SOLUTION RESPIRATORY (INHALATION) at 20:58

## 2025-07-25 RX ADMIN — HEPARIN SODIUM 5000 UNITS: 5000 INJECTION INTRAVENOUS; SUBCUTANEOUS at 14:06

## 2025-07-25 RX ADMIN — CARVEDILOL 6.25 MG: 6.25 TABLET, FILM COATED ORAL at 18:04

## 2025-07-25 RX ADMIN — ROSUVASTATIN CALCIUM 10 MG: 5 TABLET, FILM COATED ORAL at 22:13

## 2025-07-25 RX ADMIN — TROLAMINE SALICYLATE 1 APPLICATION: 100 CREAM TOPICAL at 14:07

## 2025-07-25 RX ADMIN — LEVOTHYROXINE SODIUM 50 MCG: 0.05 TABLET ORAL at 06:34

## 2025-07-25 RX ADMIN — METHYLPREDNISOLONE SODIUM SUCCINATE 40 MG: 40 INJECTION, POWDER, FOR SOLUTION INTRAMUSCULAR; INTRAVENOUS at 22:13

## 2025-07-25 RX ADMIN — CARVEDILOL 6.25 MG: 6.25 TABLET, FILM COATED ORAL at 08:01

## 2025-07-25 RX ADMIN — CAPSAICIN 1 APPLICATION: 0.75 CREAM TOPICAL at 06:34

## 2025-07-25 NOTE — PLAN OF CARE
Goal Outcome Evaluation:  Plan of Care Reviewed With: patient        Progress: no change  Outcome Evaluation: Hypertensive, given one time dose labetalol. No c/o pain. Dressing changed.

## 2025-07-25 NOTE — THERAPY EVALUATION
Patient Name: Charlette Rai  : 1935    MRN: [id number]                              Today's Date: 2025       Admit Date: 2025    Visit Dx:     ICD-10-CM ICD-9-CM   1. Acute respiratory failure, unspecified whether with hypoxia or hypercapnia  J96.00 518.81   2. Oropharyngeal dysphagia  R13.12 787.22   3. Decreased activities of daily living (ADL)  Z78.9 V49.89     Patient Active Problem List   Diagnosis    Malignant neoplasm of upper lobe of right lung    Rheumatoid arthritis    Asthma    Chronic heart failure with preserved ejection fraction    Essential hypertension    GERD (gastroesophageal reflux disease)    Hyperlipidemia LDL goal <70    Lumbar spinal stenosis    Migraine    Monoclonal paraproteinemia    Osteopenia    Oxygen dependent    Peripheral neuropathy    Stage 4 chronic kidney disease    Vitamin D deficiency    Carotid artery stenosis    Chronic right-sided thoracic back pain    Peripheral vascular disease of lower extremity    Ex-smoker    De Quervain's tenosynovitis    Arthritis of carpometacarpal (CMC) joint of right thumb    Steal syndrome of dialysis vascular access    Anemia of chronic renal failure, stage 4 (severe)    Aortic stenosis, moderate    Hematoma    End stage renal disease on dialysis    Coronary artery calcification seen on CT scan    Frailty syndrome in geriatric patient    COPD with lower respiratory infection    Coagulation defect, unspecified    Hyperphosphatemia    Hypothyroidism (acquired)    Idiopathic osteoarthritis    Secondary multiple arthritis    Type 2 diabetes mellitus with diabetic peripheral angiopathy without gangrene    Renal artery stenosis    S/P renal artery angioplasty    Atherosclerosis of renal artery    Abnormal serum immunoelectrophoresis    NSVT (nonsustained ventricular tachycardia)    Hypertensive heart and chronic kidney disease with heart failure and with stage 5 chronic kidney disease, or end stage renal disease    Unspecified  protein-calorie malnutrition    IFG (impaired fasting glucose)    Iron deficiency anemia    AV graft malfunction, initial encounter    AV graft malfunction    Weakness    Malignant neoplasm of lower lobe of left lung    Malignant neoplasm of lower lobe, right bronchus or lung    Severe protein-calorie malnutrition    Acute cystitis without hematuria    Diarrhea    Metabolic encephalopathy    Acute respiratory failure     Past Medical History:   Diagnosis Date    Allergic rhinitis     Anaphylactic shock, unspecified, initial encounter 12/05/2023    Anemia     Arthritis     Asthma     USE INHALERS AND NEBULIZERS    Back pain     Bladder disorder     Cancer     LEFT LUNG CANCER 2005 SURGERY AND CHEMO DONE  AND CURRENTLY 4/ 14/22  RIGHT LUNG CANCER HAS ONLY RECEIVED RADIATION THUS FAR    Chronic kidney disease     stage 3    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 DENIES CP BUT DOES HAVE SOA CHRONIC ISSUE COPD/LUNG CANCER    COPD (chronic obstructive pulmonary disease)     FOLLOWED BY DR MARIAJOSE BAILEY    Coronary artery disease     DENIES CP BUT DOES GET SOA MOST OF THE TIME WITH EXERTION BUT OCC AT REST CHRONIC ISSUE COPD/LUNG CANCER    Deep vein thrombosis     Diabetes mellitus     DOES NOT CHECK BS DAILY    Disease of thyroid gland     HYPOTHYROIDISM    Essential hypertension     Gastric ulcer     GERD (gastroesophageal reflux disease)     Heart murmur     History of transfusion     NO ISSUES POST TRANSFUSION WAS MANY YEARS AGO    Hyperlipemia     Leukocytopenia     FOLLOWED BY DR MIR BAILEY    Limb swelling     Lumbago     Lumbar spinal stenosis     Lung cancer     Migraine headache     Multiple joint pain     On home oxygen therapy     4L/NC PRN    Osteopenia     PNA (pneumonia) 05/04/2024    Pseudomonas pneumonia 04/29/2024    Reflux esophagitis      Shortness of breath     Thyroid nodule     HAS ULTRASOUND YEARLY BEING MONITORED    Vascular disease     Vitamin D deficiency      Past Surgical History:   Procedure Laterality Date    ABDOMINAL SURGERY      ANGIOPLASTY RENAL ARTERY Left 06/14/2024    stent placement    APPENDECTOMY      ARTERIOGRAM N/A 06/14/2024    Procedure: Arteriogram, right radial access, aortogram and renal arteriogram with possible intervention.;  Surgeon: Dio Miguel MD;  Location: [facility] CATH INVASIVE LOCATION;  Service: Peripheral Vascular;  Laterality: N/A;    ARTERIOVENOUS FISTULA/SHUNT SURGERY Left 05/10/2022    Procedure: Left basilic vein transposition;  Surgeon: Wan Barksdale MD;  Location: [facility] MAIN OR;  Service: Vascular;  Laterality: Left;    ARTERIOVENOUS FISTULA/SHUNT SURGERY Left 03/23/2023    Procedure: Ligation of left arm arteriovenous fistula;  Surgeon: Wan Barksdale MD;  Location: [facility] MAIN OR;  Service: Vascular;  Laterality: Left;    ARTERIOVENOUS FISTULA/SHUNT SURGERY Left 11/30/2023    Procedure: Creation of left arm arteriovenous graft;  Surgeon: Wan Bakrsdale MD;  Location: [facility] MAIN OR;  Service: Vascular;  Laterality: Left;    BRONCHOSCOPY N/A 04/24/2024    Procedure: BRONCHOSCOPY WITH BAL AND WASHINGS;  Surgeon: Khalif Yanez MD;  Location: [facility] ENDOSCOPY;  Service: Pulmonary;  Laterality: N/A;  MUCUS PLUGGING    CARDIAC CATHETERIZATION  1996    CARDIAC SURGERY      CARDIAC SURGERY      fluid drained from heart    CATARACT EXTRACTION, BILATERAL  2003    COLONOSCOPY  2014    ENDOSCOPY  2016    2019    ENDOSCOPY N/A 06/25/2025    Procedure: ESOPHAGOGASTRODUODENOSCOPY;  Surgeon: Tanvi Del Valle MD;  Location: [facility] ENDOSCOPY;  Service: Gastroenterology;  Laterality: N/A;  gastritis, esophagitis    FEMORAL ARTERY STENT Bilateral     HYSTERECTOMY      LEG THROMBECTOMY/EMBOLECTOMY Left 01/29/2025    Procedure: LEFT LOWER EXTREMITY ANGIOGRAM;  Surgeon: Jaden Castañeda MD;  Location: [facility]  HYBRID OR;  Service: Vascular;  Laterality: Left;    LUNG BIOPSY Left 2005    lobectomy upper lung caner    LUNG VOLUME REDUCTION      OTHER SURGICAL HISTORY      artifical joints/limbs    REPLACEMENT TOTAL KNEE Left 2016    SHUNT O GRAM N/A 01/13/2025    Procedure: dialysis shuntogram;  Surgeon: Court Valente MD;  Location: [facility] INVASIVE LOCATION;  Service: Peripheral Vascular;  Laterality: N/A;    UPPER GASTROINTESTINAL ENDOSCOPY        General Information       Row Name 07/25/25 1029          OT Time and Intention    Document Type evaluation  -AV     Mode of Treatment individual therapy;occupational therapy  -AV       Row Name 07/25/25 1029          General Information    Patient Profile Reviewed yes  -AV     Prior Level of Function --  Independent at baseline. RW. 3.5L continuous O2. has required assistance since hip fracture/ repair 6/7/25.  -AV     Existing Precautions/Restrictions fall;oxygen therapy device and L/min  DNR. WBAT LLE. soft diet/ thin liquids.  -AV     Barriers to Rehab previous functional deficit  -AV       Row Name 07/25/25 1029          Occupational Profile    Reason for Services/Referral (Occupational Profile) Patient is an 89 year old female admitted to [facility] on 7/23/25 from sub-acute rehab with dyspnea. She is currently on 4th floor/ 4L O2. OT consulted due to impaired ADL/transfer independence. No previous OT services for current condition.  -AV       Row Name 07/25/25 1029          Living Environment    People in Home spouse  -AV       Row Name 07/25/25 1029          Home Main Entrance    Number of Stairs, Main Entrance three  -AV       Row Name 07/25/25 1029          Cognition    Orientation Status (Cognition) --  alert, pleasant and cooperative. able to follow commands.  -AV       Row Name 07/25/25 1029          Safety Issues/Impairments Affecting Functional Mobility    Impairments Affecting Function (Mobility) balance;endurance/activity tolerance;strength  -AV                User Key  (r) = Recorded By, (t) = Taken By, (c) = Cosigned By      Initials Name Provider Type    Michael Carreon OT Occupational Therapist                     Mobility/ADL's       Row Name 07/25/25 1032          Transfers    Comment, (Transfers) supine to long sitting moderate assist  -AV       Row Name 07/25/25 1032          Activities of Daily Living    BADL Assessment/Intervention --  Independent feeding with setup. Max-dependent further ADLs. purewick catheter in place/ has been using bedpan otherwise.  -AV               User Key  (r) = Recorded By, (t) = Taken By, (c) = Cosigned By      Initials Name Provider Type    Michael Carreon OT Occupational Therapist                   Obj/Interventions       Row Name 07/25/25 1033          Sensory Assessment (Somatosensory)    Sensory Assessment (Somatosensory) UE sensation intact  -AV     Sensory Assessment sensory awareness to light touch  -AV       Row Name 07/25/25 1033          Vision Assessment/Intervention    Visual Impairment/Limitations WFL  -AV     Vision Assessment Comment glasses  -AV       Row Name 07/25/25 1033          Range of Motion Comprehensive    General Range of Motion bilateral upper extremity ROM WFL  -AV     Comment, General Range of Motion AROM  -AV       Row Name 07/25/25 1033          Strength Comprehensive (MMT)    Comment, General Manual Muscle Testing (MMT) Assessment 4(-)/5 bilateral biceps, triceps and   -AV       Row Name 07/25/25 1033          Motor Skills    Motor Skills coordination;functional endurance  -AV     Coordination WFL  right dominant  -AV     Functional Endurance fair minus  -AV       Row Name 07/25/25 1033          Balance    Balance Assessment sitting static balance  -AV     Static Sitting Balance contact guard;minimal assist  -AV               User Key  (r) = Recorded By, (t) = Taken By, (c) = Cosigned By      Initials Name Provider Type    Michael Carreon OT Occupational Therapist                    Goals/Plan       Row Name 07/25/25 1037          Transfer Goal 1 (OT)    Activity/Assistive Device (Transfer Goal 1, OT) transfers, all;walker, rolling  -AV     [place] Level/Cues Needed (Transfer Goal 1, OT) minimum assist (75% or more patient effort);verbal cues required  -AV     Time Frame (Transfer Goal 1, OT) long term goal (LTG);10 days  -AV       Row Name 07/25/25 1037          Bathing Goal 1 (OT)    Activity/Device (Bathing Goal 1, OT) bathing skills, all  -AV     [place] Level/Cues Needed (Bathing Goal 1, OT) minimum assist (75% or more patient effort);set-up required;verbal cues required  -AV     Time Frame (Bathing Goal 1, OT) long term goal (LTG);10 days  -AV       Row Name 07/25/25 1037          Dressing Goal 1 (OT)    Activity/Device (Dressing Goal 1, OT) dressing skills, all  -AV     [place]/Cues Needed (Dressing Goal 1, OT) minimum assist (75% or more patient effort);set-up required;verbal cues required  -AV     Time Frame (Dressing Goal 1, OT) long term goal (LTG);10 days  -AV       Row Name 07/25/25 1037          Toileting Goal 1 (OT)    Activity/Device (Toileting Goal 1, OT) toileting skills, all;raised toilet seat  -AV     [place] Level/Cues Needed (Toileting Goal 1, OT) minimum assist (75% or more patient effort);set-up required;verbal cues required  -AV     Time Frame (Toileting Goal 1, OT) long term goal (LTG);10 days  -AV       Row Name 07/25/25 1037          Grooming Goal 1 (OT)    Activity/Device (Grooming Goal 1, OT) grooming skills, all  while seated  -AV     [place] (Grooming Goal 1, OT) independent;set-up required;verbal cues required  -AV     Time Frame (Grooming Goal 1, OT) long term goal (LTG);10 days  -AV       Row Name 07/25/25 1037          Strength Goal 1 (OT)    Strength Goal 1 (OT) Patient will demonstrate 4/5 bilateral biceps, triceps and  to increase ADL/transfer independence.  -AV     Time Frame (Strength Goal 1, OT) long term goal  (LTG);10 days  -AV       Row Name 07/25/25 1037          Problem Specific Goal 1 (OT)    Problem Specific Goal 1 (OT) Patient will demonstrate fair endurance/activity tolerance needed to support ADLs.  -AV     Time Frame (Problem Specific Goal 1, OT) long term goal (LTG);10 days  -AV       Row Name 07/25/25 1037          Therapy Assessment/Plan (OT)    Planned Therapy Interventions (OT) activity tolerance training;BADL retraining;functional balance retraining;occupation/activity based interventions;patient/caregiver education/training;ROM/therapeutic exercise;strengthening exercise;transfer/mobility retraining  -AV               User Key  (r) = Recorded By, (t) = Taken By, (c) = Cosigned By      Initials Name Provider Type    AV Michael Rodríguez, MARCELLE Occupational Therapist                   Clinical Impression       Row Name 07/25/25 1035          Pain Assessment    Additional Documentation Pain Scale: FACES Pre/Post-Treatment (Group)  -AV       Row Name 07/25/25 1035          Pain Scale: FACES Pre/Post-Treatment    Pain: FACES Scale, Pretreatment 2-->hurts little bit  -AV     Posttreatment Pain Rating 2-->hurts little bit  -AV     Pre/Posttreatment Pain Comment back, legs and feet  -AV       Row Name 07/25/25 1035          Plan of Care Review    Plan of Care Reviewed With patient  -AV     Progress no change  first session for evaluation  -AV     Outcome Evaluation Patient presents with limitations of balance, strength and endurance/activity tolerance which are impacting ADL/transfer independence. Skilled OT services are indicated to remediate/ compensate for deficits to maximize independence and safety with functional ADL tasks  -AV       Row Name 07/25/25 1032          Therapy Assessment/Plan (OT)    Patient/Family Therapy Goal Statement (OT) maximize independence  -AV     Rehab Potential (OT) fair  -AV     Criteria for Skilled Therapeutic Interventions Met (OT) yes;meets criteria;skilled treatment is necessary  -AV      Therapy Frequency (OT) 5 times/wk  -AV       Row Name 07/25/25 1035          Therapy Plan Review/Discharge Plan (OT)    Anticipated Discharge Disposition (OT) --  return to sub-acute rehab  -AV       Row Name 07/25/25 1035          Vital Signs    O2 Delivery Pre Treatment nasal cannula  4L  -AV     O2 Delivery Intra Treatment nasal cannula  4L  -AV     O2 Delivery Post Treatment nasal cannula  4L  -AV       Row Name 07/25/25 1035          Positioning and Restraints    Pre-Treatment Position in bed  -AV     Post Treatment Position bed  -AV     In Bed call light within reach;encouraged to call for assist;exit alarm on  -AV               User Key  (r) = Recorded By, (t) = Taken By, (c) = Cosigned By      Initials Name Provider Type    Michael Carreon, MARCELLE Occupational Therapist                   Outcome Measures       Row Name 07/25/25 1039          How much help from another is currently needed...    Putting on and taking off regular lower body clothing? 1  -AV     Bathing (including washing, rinsing, and drying) 2  -AV     Toileting (which includes using toilet bed pan or urinal) 1  -AV     Putting on and taking off regular upper body clothing 2  -AV     Taking care of personal grooming (such as brushing teeth) 2  -AV     Eating meals 4  -AV     AM-PAC 6 Clicks Score (OT) 12  -AV       Row Name 07/25/25 0800          How much help from another person do you currently need...    Turning from your back to your side while in flat bed without using bedrails? 2  -NR     Moving from lying on back to sitting on the side of a flat bed without bedrails? 1  -NR     Moving to and from a bed to a chair (including a wheelchair)? 1  -NR     Standing up from a chair using your arms (e.g., wheelchair, bedside chair)? 1  -NR     Climbing 3-5 steps with a railing? 1  -NR     To walk in hospital room? 1  -NR     AM-PAC 6 Clicks Score (PT) 7  -NR       Row Name 07/25/25 1039          Functional Assessment    Outcome Measure Options  AM-PAC 6 Clicks Daily Activity (OT);Optimal Instrument  -AV       Row Name 07/25/25 1039          Optimal Instrument    Optimal Instrument Optimal - 3  -AV     Bending/Stooping 3  -AV     Standing 5  -AV     Reaching 1  -AV     From the list, choose the 3 activities you would most like to be able to do without any difficulty Bending/stooping;Standing;Reaching  -AV     Total Score Optimal - 3 9  -AV               User Key  (r) = Recorded By, (t) = Taken By, (c) = Cosigned By      Initials Name Provider Type    AV Michael Rodríguez, OT Occupational Therapist    NR Lori Arevalo, RN Registered Nurse                    Occupational Therapy Education       Title: PT OT SLP Therapies (In Progress)       Topic: Occupational Therapy (Done)       Point: ADL training (Done)       Learning Progress Summary            Patient Acceptance, E, VU by AV at 7/25/2025 1039                      Point: Home exercise program (Done)       Learning Progress Summary            Patient Acceptance, E, VU by AV at 7/25/2025 1039                      Point: Precautions (Done)       Learning Progress Summary            Patient Acceptance, E, VU by AV at 7/25/2025 1039                      Point: Body mechanics (Done)       Learning Progress Summary            Patient Acceptance, E, VU by AV at 7/25/2025 1039                                      User Key       Initials Effective Dates Name Provider Type Discipline     06/16/21 -  Michael Rodríguez OT Occupational Therapist OT                  OT Recommendation and Plan  Planned Therapy Interventions (OT): activity tolerance training, BADL retraining, functional balance retraining, occupation/activity based interventions, patient/caregiver education/training, ROM/therapeutic exercise, strengthening exercise, transfer/mobility retraining  Therapy Frequency (OT): 5 times/wk  Plan of Care Review  Plan of Care Reviewed With: patient  Progress: no change (first session for evaluation)  Outcome  Evaluation: Patient presents with limitations of balance, strength and endurance/activity tolerance which are impacting ADL/transfer independence. Skilled OT services are indicated to remediate/ compensate for deficits to maximize independence and safety with functional ADL tasks     Time Calculation:   Evaluation Complexity (OT)  Review Occupational Profile/Medical/Therapy History Complexity: expanded/moderate complexity  Assessment, Occupational Performance/Identification of Deficit Complexity: 1-3 performance deficits  Clinical Decision Making Complexity (OT): problem focused assessment/low complexity  Overall Complexity of Evaluation (OT): low complexity     Time Calculation- OT       Row Name 07/25/25 1040             Time Calculation- OT    OT Received On 07/25/25  -AV      OT Goal Re-Cert Due Date 08/03/25  -AV         Untimed Charges    OT Eval/Re-eval Minutes 35  -AV         Total Minutes    Untimed Charges Total Minutes 35  -AV       Total Minutes 35  -AV                User Key  (r) = Recorded By, (t) = Taken By, (c) = Cosigned By      Initials Name Provider Type    AV Michael Rodríguez OT Occupational Therapist                  Therapy Charges for Today       Code Description Service Date Service Provider Modifiers Qty    [id number]  OT EVAL LOW COMPLEXITY 3 7/25/2025 Michael Rodríguez OT GO 1                 Michael Rodríguez OT  7/25/2025

## 2025-07-25 NOTE — THERAPY TREATMENT NOTE
Acute Care - Speech Language Pathology   Swallow Treatment Note MAURICE Escamilla     Patient Name: Charlette Rai  : 1935  MRN: [id number]  Today's Date: 2025               Admit Date: 2025    Visit Dx:     ICD-10-CM ICD-9-CM   1. Acute respiratory failure, unspecified whether with hypoxia or hypercapnia  J96.00 518.81   2. Oropharyngeal dysphagia  R13.12 787.22   3. Decreased activities of daily living (ADL)  Z78.9 V49.89     Patient Active Problem List   Diagnosis    Malignant neoplasm of upper lobe of right lung    Rheumatoid arthritis    Asthma    Chronic heart failure with preserved ejection fraction    Essential hypertension    GERD (gastroesophageal reflux disease)    Hyperlipidemia LDL goal <70    Lumbar spinal stenosis    Migraine    Monoclonal paraproteinemia    Osteopenia    Oxygen dependent    Peripheral neuropathy    Stage 4 chronic kidney disease    Vitamin D deficiency    Carotid artery stenosis    Chronic right-sided thoracic back pain    Peripheral vascular disease of lower extremity    Ex-smoker    De Quervain's tenosynovitis    Arthritis of carpometacarpal (CMC) joint of right thumb    Steal syndrome of dialysis vascular access    Anemia of chronic renal failure, stage 4 (severe)    Aortic stenosis, moderate    Hematoma    End stage renal disease on dialysis    Coronary artery calcification seen on CT scan    Frailty syndrome in geriatric patient    COPD with lower respiratory infection    Coagulation defect, unspecified    Hyperphosphatemia    Hypothyroidism (acquired)    Idiopathic osteoarthritis    Secondary multiple arthritis    Type 2 diabetes mellitus with diabetic peripheral angiopathy without gangrene    Renal artery stenosis    S/P renal artery angioplasty    Atherosclerosis of renal artery    Abnormal serum immunoelectrophoresis    NSVT (nonsustained ventricular tachycardia)    Hypertensive heart and chronic kidney disease with heart failure and with stage 5 chronic kidney  disease, or end stage renal disease    Unspecified protein-calorie malnutrition    IFG (impaired fasting glucose)    Iron deficiency anemia    AV graft malfunction, initial encounter    AV graft malfunction    Weakness    Malignant neoplasm of lower lobe of left lung    Malignant neoplasm of lower lobe, right bronchus or lung    Severe protein-calorie malnutrition    Acute cystitis without hematuria    Diarrhea    Metabolic encephalopathy    Acute respiratory failure     Past Medical History:   Diagnosis Date    Allergic rhinitis     Anaphylactic shock, unspecified, initial encounter 12/05/2023    Anemia     Arthritis     Asthma     USE INHALERS AND NEBULIZERS    Back pain     Bladder disorder     Cancer     LEFT LUNG CANCER 2005 SURGERY AND CHEMO DONE  AND CURRENTLY 4/ 14/22  RIGHT LUNG CANCER HAS ONLY RECEIVED RADIATION THUS FAR    Chronic kidney disease     stage 3    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 DENIES CP BUT DOES HAVE SOA CHRONIC ISSUE COPD/LUNG CANCER    COPD (chronic obstructive pulmonary disease)     FOLLOWED BY DR MARIAJOSE BAILEY    Coronary artery disease     DENIES CP BUT DOES GET SOA MOST OF THE TIME WITH EXERTION BUT OCC AT REST CHRONIC ISSUE COPD/LUNG CANCER    Deep vein thrombosis     Diabetes mellitus     DOES NOT CHECK BS DAILY    Disease of thyroid gland     HYPOTHYROIDISM    Essential hypertension     Gastric ulcer     GERD (gastroesophageal reflux disease)     Heart murmur     History of transfusion     NO ISSUES POST TRANSFUSION WAS MANY YEARS AGO    Hyperlipemia     Leukocytopenia     FOLLOWED BY DR MIR BAILEY    Limb swelling     Lumbago     Lumbar spinal stenosis     Lung cancer     Migraine headache     Multiple joint pain     On home oxygen therapy     4L/NC PRN    Osteopenia     PNA (pneumonia) 05/04/2024    Pseudomonas  pneumonia 04/29/2024    Reflux esophagitis     Shortness of breath     Thyroid nodule     HAS ULTRASOUND YEARLY BEING MONITORED    Vascular disease     Vitamin D deficiency      Past Surgical History:   Procedure Laterality Date    ABDOMINAL SURGERY      ANGIOPLASTY RENAL ARTERY Left 06/14/2024    stent placement    APPENDECTOMY      ARTERIOGRAM N/A 06/14/2024    Procedure: Arteriogram, right radial access, aortogram and renal arteriogram with possible intervention.;  Surgeon: Dio Miguel MD;  Location: [facility] CATH INVASIVE LOCATION;  Service: Peripheral Vascular;  Laterality: N/A;    ARTERIOVENOUS FISTULA/SHUNT SURGERY Left 05/10/2022    Procedure: Left basilic vein transposition;  Surgeon: Wan Barksdale MD;  Location: [facility] MAIN OR;  Service: Vascular;  Laterality: Left;    ARTERIOVENOUS FISTULA/SHUNT SURGERY Left 03/23/2023    Procedure: Ligation of left arm arteriovenous fistula;  Surgeon: Wan Barksdale MD;  Location: [facility] MAIN OR;  Service: Vascular;  Laterality: Left;    ARTERIOVENOUS FISTULA/SHUNT SURGERY Left 11/30/2023    Procedure: Creation of left arm arteriovenous graft;  Surgeon: Wan Barksdale MD;  Location: [facility] MAIN OR;  Service: Vascular;  Laterality: Left;    BRONCHOSCOPY N/A 04/24/2024    Procedure: BRONCHOSCOPY WITH BAL AND WASHINGS;  Surgeon: Khalif Yanez MD;  Location: [facility] ENDOSCOPY;  Service: Pulmonary;  Laterality: N/A;  MUCUS PLUGGING    CARDIAC CATHETERIZATION  1996    CARDIAC SURGERY      CARDIAC SURGERY      fluid drained from heart    CATARACT EXTRACTION, BILATERAL  2003    COLONOSCOPY  2014    ENDOSCOPY  2016    2019    ENDOSCOPY N/A 06/25/2025    Procedure: ESOPHAGOGASTRODUODENOSCOPY;  Surgeon: Tanvi Del Valle MD;  Location: [facility] ENDOSCOPY;  Service: Gastroenterology;  Laterality: N/A;  gastritis, esophagitis    FEMORAL ARTERY STENT Bilateral     HYSTERECTOMY      LEG THROMBECTOMY/EMBOLECTOMY Left 01/29/2025    Procedure: LEFT LOWER EXTREMITY ANGIOGRAM;   Surgeon: Jaden Castañeda MD;  Location: [facility] HYBRID OR;  Service: Vascular;  Laterality: Left;    LUNG BIOPSY Left 2005    lobectomy upper lung caner    LUNG VOLUME REDUCTION      OTHER SURGICAL HISTORY      artifical joints/limbs    REPLACEMENT TOTAL KNEE Left 2016    SHUNT O GRAM N/A 01/13/2025    Procedure: dialysis shuntogram;  Surgeon: Court Valente MD;  Location: [facility] CATH INVASIVE LOCATION;  Service: Peripheral Vascular;  Laterality: N/A;    UPPER GASTROINTESTINAL ENDOSCOPY         SLP Recommendation and Plan         SPEECH PATHOLOGY DYSPHAGIA TREATMENT    Subjective/Behavioral Observations: awake, cooperative, feeding self        Day/time of Treatment:7/25/25        Current Diet:soft to chew        Current Strategies:assist for set up, small bites and sips, meds in applesauce        Treatment received:focused on dysphagia goals        Results of treatment:patient feeding self with assist for meal set up and items cut small. Single sips of thin liquids by straw. Swallows completed without overt s/s of aspiration. Bites of soft solids with adequate manipulation. Swallows completed without s/s of aspiration.         Progress toward goals:goals met        Barriers to Achieving goals: n/a        Plan of care:/changes in plan: patient tolerating least restrictive diet of soft to chew solids, thin liquids. No further speech therapy needed at this time due to goals met.                                                                                              EDUCATION  The patient has been educated in the following areas:   Dysphagia (Swallowing Impairment).                Time Calculation:    Time Calculation- SLP       Row Name 07/25/25 1129             Time Calculation- SLP    SLP Stop Time 0800  -SN      SLP Received On 07/25/25  -SN         Untimed Charges    77202-EN Treatment Swallow Minutes 45  -SN         Total Minutes    Untimed Charges Total Minutes 45  -SN       Total Minutes 45  -SN                 User Key  (r) = Recorded By, (t) = Taken By, (c) = Cosigned By      Initials Name Provider Type    SN Denise Crowell MS-CCC/SLP, VISH Speech and Language Pathologist                    Therapy Charges for Today       Code Description Service Date Service Provider Modifiers Qty    [id number]  ST EVAL ORAL PHARYNG SWALLOW 4 7/24/2025 Denise Crowell MS-CCC/SLP, VISH GN 1    [id number] HC ST TREATMENT SWALLOW 3 7/25/2025 Denise Crowell MS-CCC/SLP, VISH GN 1                 KAYCEE Watson/VISH BILL  7/25/2025

## 2025-07-25 NOTE — PLAN OF CARE
Goal Outcome Evaluation:   Awake and alert. Dialyzed again today and plan is for patient to dialyze again tomorrow. Complains of back pain, patient wanting something stronger that Tylenol, however, MD hesitant to give something stronger. Complains of increased shortness of breath, requesting prn nebulizer treatments. Respirations even , unlabored. No cough noted.

## 2025-07-25 NOTE — THERAPY EVALUATION
Acute Care - Physical Therapy Initial Evaluation  MAURICE Escamilla     Patient Name: Charlette Rai  : 1935  MRN: [id number]  Today's Date: 2025      Visit Dx:     ICD-10-CM ICD-9-CM   1. Acute respiratory failure, unspecified whether with hypoxia or hypercapnia  J96.00 518.81   2. Oropharyngeal dysphagia  R13.12 787.22   3. Decreased activities of daily living (ADL)  Z78.9 V49.89   4. Difficulty walking  R26.2 719.7     Patient Active Problem List   Diagnosis    Malignant neoplasm of upper lobe of right lung    Rheumatoid arthritis    Asthma    Chronic heart failure with preserved ejection fraction    Essential hypertension    GERD (gastroesophageal reflux disease)    Hyperlipidemia LDL goal <70    Lumbar spinal stenosis    Migraine    Monoclonal paraproteinemia    Osteopenia    Oxygen dependent    Peripheral neuropathy    Stage 4 chronic kidney disease    Vitamin D deficiency    Carotid artery stenosis    Chronic right-sided thoracic back pain    Peripheral vascular disease of lower extremity    Ex-smoker    De Quervain's tenosynovitis    Arthritis of carpometacarpal (CMC) joint of right thumb    Steal syndrome of dialysis vascular access    Anemia of chronic renal failure, stage 4 (severe)    Aortic stenosis, moderate    Hematoma    End stage renal disease on dialysis    Coronary artery calcification seen on CT scan    Frailty syndrome in geriatric patient    COPD with lower respiratory infection    Coagulation defect, unspecified    Hyperphosphatemia    Hypothyroidism (acquired)    Idiopathic osteoarthritis    Secondary multiple arthritis    Type 2 diabetes mellitus with diabetic peripheral angiopathy without gangrene    Renal artery stenosis    S/P renal artery angioplasty    Atherosclerosis of renal artery    Abnormal serum immunoelectrophoresis    NSVT (nonsustained ventricular tachycardia)    Hypertensive heart and chronic kidney disease with heart failure and with stage 5 chronic kidney disease, or  end stage renal disease    Unspecified protein-calorie malnutrition    IFG (impaired fasting glucose)    Iron deficiency anemia    AV graft malfunction, initial encounter    AV graft malfunction    Weakness    Malignant neoplasm of lower lobe of left lung    Malignant neoplasm of lower lobe, right bronchus or lung    Severe protein-calorie malnutrition    Acute cystitis without hematuria    Diarrhea    Metabolic encephalopathy    Acute respiratory failure     Past Medical History:   Diagnosis Date    Allergic rhinitis     Anaphylactic shock, unspecified, initial encounter 12/05/2023    Anemia     Arthritis     Asthma     USE INHALERS AND NEBULIZERS    Back pain     Bladder disorder     Cancer     LEFT LUNG CANCER 2005 SURGERY AND CHEMO DONE  AND CURRENTLY 4/ 14/22  RIGHT LUNG CANCER HAS ONLY RECEIVED RADIATION THUS FAR    Chronic kidney disease     stage 3    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 DENIES CP BUT DOES HAVE SOA CHRONIC ISSUE COPD/LUNG CANCER    COPD (chronic obstructive pulmonary disease)     FOLLOWED BY DR MARIAJOSE BAILEY    Coronary artery disease     DENIES CP BUT DOES GET SOA MOST OF THE TIME WITH EXERTION BUT OCC AT REST CHRONIC ISSUE COPD/LUNG CANCER    Deep vein thrombosis     Diabetes mellitus     DOES NOT CHECK BS DAILY    Disease of thyroid gland     HYPOTHYROIDISM    Essential hypertension     Gastric ulcer     GERD (gastroesophageal reflux disease)     Heart murmur     History of transfusion     NO ISSUES POST TRANSFUSION WAS MANY YEARS AGO    Hyperlipemia     Leukocytopenia     FOLLOWED BY DR MIR BAILEY    Limb swelling     Lumbago     Lumbar spinal stenosis     Lung cancer     Migraine headache     Multiple joint pain     On home oxygen therapy     4L/NC PRN    Osteopenia     PNA (pneumonia) 05/04/2024    Pseudomonas pneumonia  04/29/2024    Reflux esophagitis     Shortness of breath     Thyroid nodule     HAS ULTRASOUND YEARLY BEING MONITORED    Vascular disease     Vitamin D deficiency      Past Surgical History:   Procedure Laterality Date    ABDOMINAL SURGERY      ANGIOPLASTY RENAL ARTERY Left 06/14/2024    stent placement    APPENDECTOMY      ARTERIOGRAM N/A 06/14/2024    Procedure: Arteriogram, right radial access, aortogram and renal arteriogram with possible intervention.;  Surgeon: Dio Miguel MD;  Location: [facility] CATH INVASIVE LOCATION;  Service: Peripheral Vascular;  Laterality: N/A;    ARTERIOVENOUS FISTULA/SHUNT SURGERY Left 05/10/2022    Procedure: Left basilic vein transposition;  Surgeon: Wan Barksdale MD;  Location: [facility] MAIN OR;  Service: Vascular;  Laterality: Left;    ARTERIOVENOUS FISTULA/SHUNT SURGERY Left 03/23/2023    Procedure: Ligation of left arm arteriovenous fistula;  Surgeon: Wan Barksdale MD;  Location: [facility] MAIN OR;  Service: Vascular;  Laterality: Left;    ARTERIOVENOUS FISTULA/SHUNT SURGERY Left 11/30/2023    Procedure: Creation of left arm arteriovenous graft;  Surgeon: Wan Barksdale MD;  Location: [facility] MAIN OR;  Service: Vascular;  Laterality: Left;    BRONCHOSCOPY N/A 04/24/2024    Procedure: BRONCHOSCOPY WITH BAL AND WASHINGS;  Surgeon: Khalif Yanez MD;  Location: [facility] ENDOSCOPY;  Service: Pulmonary;  Laterality: N/A;  MUCUS PLUGGING    CARDIAC CATHETERIZATION  1996    CARDIAC SURGERY      CARDIAC SURGERY      fluid drained from heart    CATARACT EXTRACTION, BILATERAL  2003    COLONOSCOPY  2014    ENDOSCOPY  2016    2019    ENDOSCOPY N/A 06/25/2025    Procedure: ESOPHAGOGASTRODUODENOSCOPY;  Surgeon: Tanvi Del Valle MD;  Location: [facility] ENDOSCOPY;  Service: Gastroenterology;  Laterality: N/A;  gastritis, esophagitis    FEMORAL ARTERY STENT Bilateral     HYSTERECTOMY      LEG THROMBECTOMY/EMBOLECTOMY Left 01/29/2025    Procedure: LEFT LOWER EXTREMITY ANGIOGRAM;  Surgeon:  Jaden Castañeda MD;  Location: [facility] HYBRID OR;  Service: Vascular;  Laterality: Left;    LUNG BIOPSY Left 2005    lobectomy upper lung caner    LUNG VOLUME REDUCTION      OTHER SURGICAL HISTORY      artifical joints/limbs    REPLACEMENT TOTAL KNEE Left 2016    SHUNT O GRAM N/A 01/13/2025    Procedure: dialysis shuntogram;  Surgeon: Court Valente MD;  Location: [facility] CATH INVASIVE LOCATION;  Service: Peripheral Vascular;  Laterality: N/A;    UPPER GASTROINTESTINAL ENDOSCOPY       PT Assessment (Last 12 Hours)       PT Evaluation and Treatment       Row Name 07/25/25 1300          Physical Therapy Time and Intention    Subjective Information complains of;weakness;pain;fatigue  -DP     Document Type evaluation  -DP     Mode of Treatment individual therapy;physical therapy  -DP     Patient Effort good  -DP       Row Name 07/25/25 1300          General Information    Patient Profile Reviewed yes  -DP     Patient Observations alert;cooperative  -DP     General Observations of Patient Patient was at rehab prior to this hospital admission after a left femur fracture on 6/7/2025.  At baseline she was independent with all ADLs and transfers.  -DP     Prior Level of Function independent:;gait;transfer;bed mobility;ADL's  -DP     Equipment Currently Used at Home oxygen;walker, rolling  -DP     Existing Precautions/Restrictions fall  -DP     Barriers to Rehab none identified  -DP       Row Name 07/25/25 1300          Living Environment    Current Living Arrangements home  -DP     People in Home spouse  -DP       Row Name 07/25/25 1300          Range of Motion (ROM)    Range of Motion bilateral lower extremities;ROM is WFL  -DP       Row Name 07/25/25 1300          Strength Comprehensive (MMT)    General Manual Muscle Testing (MMT) Assessment lower extremity strength deficits identified  -DP     Comment, General Manual Muscle Testing (MMT) Assessment BLE: 3+/5  -DP       Row Name 07/25/25 1342          Mobility    Extremity  Weight-bearing Status left lower extremity  -DP     Left Lower Extremity (Weight-bearing Status) weight-bearing as tolerated (WBAT)  -DP       Row Name 07/25/25 1300          Bed Mobility    Bed Mobility supine-sit-supine  -DP     Supine-Sit-Supine [place] (Bed Mobility) moderate assist (50% patient effort)  -DP     Bed Mobility, Safety Issues decreased use of legs for bridging/pushing;decreased use of arms for pushing/pulling  -DP     Assistive Device (Bed Mobility) bed rails;head of bed elevated  -DP       Row Name 07/25/25 1300          Transfers    Transfers sit-stand transfer  -DP       Row Name 07/25/25 1300          Sit-Stand Transfer    Sit-Stand [place] (Transfers) moderate assist (50% patient effort);maximum assist (25% patient effort)  -DP     Assistive Device (Sit-Stand Transfers) walker, front-wheeled  -DP       Row Name 07/25/25 1300          Gait/Stairs (Locomotion)    Gait/Stairs Locomotion gait/ambulation assistive device  -DP     [place] Level (Gait) moderate assist (50% patient effort)  -DP     Assistive Device (Gait) walker, front-wheeled  -DP     Patient was able to Ambulate yes  -DP     Distance in Feet (Gait) 2  -DP     Comment, (Gait/Stairs) Patient was very fatigued and tired since she had just come back from dialysis  -DP       Row Name 07/25/25 1300          Balance    Balance Assessment standing static balance  -DP     Static Sitting Balance standby assist  -DP       Row Name             Wound 07/24/25 1220 Right posterior heel Pressure Injury    Wound - Properties Group Placement Date: 07/24/25  -MILTON Placement Time: 1220  -MILTON Present on Original Admission: Y  -JT Side: Right  -JT Orientation: posterior  -JT Location: heel  -JT Primary Wound Type: Pressure Inj  -JT Removal Date: --  -MILTON Removal Time: --  -MILTON    Retired Wound - Properties Group Placement Date: 07/24/25  -MILTON Placement Time: 1220  -MILTON Present on Original Admission: Y  -JT Side: Right  -JT Orientation: posterior   -JT Location: heel  -JT Removal Date: --  -MILTON Removal Time: --  -MILTON    Retired Wound - Properties Group Placement Date: 07/24/25  -MILTON Placement Time: 1220  -MILTON Present on Original Admission: Y  -JT Side: Right  -JT Orientation: posterior  -JT Location: heel  -JT Removal Date: --  -MILTON Removal Time: --  -MILTON    Retired Wound - Properties Group Date first assessed: 07/24/25  -MILTON Time first assessed: 1220  -MILTON Present on Original Admission: Y  -JT Side: Right  -JT Location: heel  -JT Resolution Date: --  -MILTON Resolution Time: --  -MILTON      Row Name             Wound 07/15/25 1213 Bilateral gluteal Irritant Contact    Wound - Properties Group Placement Date: 07/15/25  -MILTON Placement Time: 1213  -MILTON Present on Original Admission: Y  -MILTON Side: Bilateral  -MILTON Location: gluteal  -MILTON Primary Wound Type: Irritant Con  -MILTON Removal Date: --  -MILTON Removal Time: --  -MILTON    Retired Wound - Properties Group Placement Date: 07/15/25  -MILTON Placement Time: 1213  -MILTON Present on Original Admission: Y  -MILTON Side: Bilateral  -MILTON Location: gluteal  -MILTON Removal Date: --  -MILTON Removal Time: --  -MILTON    Retired Wound - Properties Group Placement Date: 07/15/25  -MILTON Placement Time: 1213  -MILTON Present on Original Admission: Y  -MILTON Side: Bilateral  -MILTON Location: gluteal  -MILTON Removal Date: --  -MILTON Removal Time: --  -MILTON    Retired Wound - Properties Group Date first assessed: 07/15/25  -MILTON Time first assessed: 1213  -MILTON Present on Original Admission: Y  -MILTON Side: Bilateral  -MILTON Location: gluteal  -MILTON Resolution Date: --  -MILTON Resolution Time: --  -MILTON      Row Name 07/25/25 1300          Plan of Care Review    Plan of Care Reviewed With patient  -DP     Outcome Evaluation Pt presents with decreased strength, transfers and functional mobility. Will benefit from inpatient PT services and continued PT services upon discharge.  -DP       Row Name 07/25/25 1300          Therapy Assessment/Plan (PT)    Criteria for Skilled Interventions Met (PT) yes;meets criteria   -DP     Therapy Frequency (PT) daily  -DP     Predicted Duration of Therapy Intervention (PT) 10 days  -DP     Problem List (PT) problems related to;mobility  -DP     Activity Limitations Related to Problem List (PT) unable to transfer safely;unable to ambulate safely  -DP       Row Name 07/25/25 1300          PT Evaluation Complexity    History, PT Evaluation Complexity no personal factors and/or comorbidities  -DP     Examination of Body Systems (PT Eval Complexity) total of 4 or more elements  -DP     Clinical Presentation (PT Evaluation Complexity) stable  -DP     Clinical Decision Making (PT Evaluation Complexity) low complexity  -DP     Overall Complexity (PT Evaluation Complexity) low complexity  -DP       Row Name 07/25/25 1300          Physical Therapy Goals    Transfer Goal Selection (PT) transfer, PT goal 1  -DP     Gait Training Goal Selection (PT) gait training, PT goal 1  -DP       Row Name 07/25/25 1300          Transfer Goal 1 (PT)    Activity/Assistive Device (Transfer Goal 1, PT) sit-to-stand/stand-to-sit  -DP     [place] Level/Cues Needed (Transfer Goal 1, PT) supervision required  -DP     Time Frame (Transfer Goal 1, PT) 10 days  -DP       Row Name 07/25/25 1300          Gait Training Goal 1 (PT)    Activity/Assistive Device (Gait Training Goal 1, PT) assistive device use;walker, rolling  -DP     [place] Level (Gait Training Goal 1, PT) supervision required  -DP     Distance (Gait Training Goal 1, PT) 200  -DP     Time Frame (Gait Training Goal 1, PT) 10 days  -DP               User Key  (r) = Recorded By, (t) = Taken By, (c) = Cosigned By      Initials Name Provider Type    Hamida Delgado, RN Registered Nurse    Kaylene Perez, PT Physical Therapist    Padmaja Soto RN Registered Nurse                    Physical Therapy Education       Title: PT OT SLP Therapies (In Progress)       Topic: Physical Therapy (Not Started)       Point: Mobility training (Not Started)        Learner Progress:  Not documented in this visit.              Point: Home exercise program (Not Started)       Learner Progress:  Not documented in this visit.              Point: Body mechanics (Not Started)       Learner Progress:  Not documented in this visit.              Point: Precautions (Not Started)       Learner Progress:  Not documented in this visit.                                  PT Recommendation and Plan  Anticipated Discharge Disposition (PT): sub acute care setting  Planned Therapy Interventions (PT): balance training, bed mobility training, gait training, strengthening, transfer training  Therapy Frequency (PT): daily  Plan of Care Reviewed With: patient  Outcome Evaluation: Pt presents with decreased strength, transfers and functional mobility. Will benefit from inpatient PT services and continued PT services upon discharge.   Outcome Measures       Row Name 07/25/25 1300             How much help from another person do you currently need...    Turning from your back to your side while in flat bed without using bedrails? 2  -DP      Moving from lying on back to sitting on the side of a flat bed without bedrails? 2  -DP      Moving to and from a bed to a chair (including a wheelchair)? 2  -DP      Standing up from a chair using your arms (e.g., wheelchair, bedside chair)? 2  -DP      Climbing 3-5 steps with a railing? 1  -DP      To walk in hospital room? 1  -DP      AM-PAC 6 Clicks Score (PT) 10  -DP         Functional Assessment    Outcome Measure Options AM-PAC 6 Clicks Basic Mobility (PT)  -DP                User Key  (r) = Recorded By, (t) = Taken By, (c) = Cosigned By      Initials Name Provider Type    Kaylene Perez, PT Physical Therapist                     Time Calculation:    PT Charges       Row Name 07/25/25 5877             Time Calculation    PT Received On 07/25/25  -DP      PT Goal Re-Cert Due Date 08/03/25  -DP         Untimed Charges    PT Eval/Re-eval Minutes 23  -DP          Total Minutes    Untimed Charges Total Minutes 23  -DP       Total Minutes 23  -DP                User Key  (r) = Recorded By, (t) = Taken By, (c) = Cosigned By      Initials Name Provider Type    Kaylene Perez PT Physical Therapist                      PT G-Codes  Outcome Measure Options: AM-PAC 6 Clicks Basic Mobility (PT)  AM-PAC 6 Clicks Score (PT): 10  AM-PAC 6 Clicks Score (OT): 12    Kaylene Patterson PT  7/25/2025

## 2025-07-25 NOTE — CONSULTS
[facility]   Consult Note    Patient Name: Charlette Rai  : 1935  MRN: [id number]  Primary Care Physician:  Brennan Mosqueda MD  Referring Physician: Pieter Salgado MD  Date of admission: 2025    Inpatient Pulmonology Consult  Consult performed by: Regis Holcomb DO  Consult ordered by: Pieter Salgado MD        Subjective   Subjective     Reason for Consult/ Chief Complaint: Shortness of breath    History of Present Illness  Charlette Rai is a 89 y.o. female with past medical history of PAD, ESRD on HD, CHF presented to the ED for evaluation of worsening shortness of breath.  In the ED, proBNP was 47,000, troponins 201, procalcitonin 0.36.  Chest CT was obtained demonstrating concern for pneumonitis and changes from left upper lobe lobectomy.  This service was consulted to assist in the management treatment patient's acute issues.  Upon assessment, patient lying in bed on 4 L nasal cannula.  Findings plan were discussed with the patient and family numbers at bedside.  She reported that she does become increasingly short of breath over the last couple days.  Patient denies any productive cough.  Patient denies fever, chills, nausea, vomiting.    Review of Systems     Personal History     Past Medical History:   Diagnosis Date    Allergic rhinitis     Anaphylactic shock, unspecified, initial encounter 2023    Anemia     Arthritis     Asthma     USE INHALERS AND NEBULIZERS    Back pain     Bladder disorder     Cancer     LEFT LUNG CANCER  SURGERY AND CHEMO DONE  AND CURRENTLY   RIGHT LUNG CANCER HAS ONLY RECEIVED RADIATION THUS FAR    Chronic kidney disease     stage 3    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 DENIES CP BUT DOES HAVE SOA CHRONIC ISSUE COPD/LUNG CANCER    COPD (chronic  obstructive pulmonary disease)     FOLLOWED BY DR MARIAJOSE BAILEY    Coronary artery disease     DENIES CP BUT DOES GET SOA MOST OF THE TIME WITH EXERTION BUT OCC AT REST CHRONIC ISSUE COPD/LUNG CANCER    Deep vein thrombosis     Diabetes mellitus     DOES NOT CHECK BS DAILY    Disease of thyroid gland     HYPOTHYROIDISM    Essential hypertension     Gastric ulcer     GERD (gastroesophageal reflux disease)     Heart murmur     History of transfusion     NO ISSUES POST TRANSFUSION WAS MANY YEARS AGO    Hyperlipemia     Leukocytopenia     FOLLOWED BY DR MIR BAILEY    Limb swelling     Lumbago     Lumbar spinal stenosis     Lung cancer     Migraine headache     Multiple joint pain     On home oxygen therapy     4L/NC PRN    Osteopenia     PNA (pneumonia) 05/04/2024    Pseudomonas pneumonia 04/29/2024    Reflux esophagitis     Shortness of breath     Thyroid nodule     HAS ULTRASOUND YEARLY BEING MONITORED    Vascular disease     Vitamin D deficiency        Past Surgical History:   Procedure Laterality Date    ABDOMINAL SURGERY      ANGIOPLASTY RENAL ARTERY Left 06/14/2024    stent placement    APPENDECTOMY      ARTERIOGRAM N/A 06/14/2024    Procedure: Arteriogram, right radial access, aortogram and renal arteriogram with possible intervention.;  Surgeon: Dio Miguel MD;  Location: [facility] CATH INVASIVE LOCATION;  Service: Peripheral Vascular;  Laterality: N/A;    ARTERIOVENOUS FISTULA/SHUNT SURGERY Left 05/10/2022    Procedure: Left basilic vein transposition;  Surgeon: Wan Barksdale MD;  Location: [facility] MAIN OR;  Service: Vascular;  Laterality: Left;    ARTERIOVENOUS FISTULA/SHUNT SURGERY Left 03/23/2023    Procedure: Ligation of left arm arteriovenous fistula;  Surgeon: Wan Barksdale MD;  Location: [facility] MAIN OR;  Service: Vascular;  Laterality: Left;    ARTERIOVENOUS FISTULA/SHUNT SURGERY Left 11/30/2023    Procedure: Creation of left arm arteriovenous graft;  Surgeon: Wan Barksdale MD;  Location: [facility] OR;   Service: Vascular;  Laterality: Left;    BRONCHOSCOPY N/A 04/24/2024    Procedure: BRONCHOSCOPY WITH BAL AND WASHINGS;  Surgeon: Khalif Yanez MD;  Location: [facility] ENDOSCOPY;  Service: Pulmonary;  Laterality: N/A;  MUCUS PLUGGING    CARDIAC CATHETERIZATION  1996    CARDIAC SURGERY      CARDIAC SURGERY      fluid drained from heart    CATARACT EXTRACTION, BILATERAL  2003    COLONOSCOPY  2014    ENDOSCOPY  2016    2019    ENDOSCOPY N/A 06/25/2025    Procedure: ESOPHAGOGASTRODUODENOSCOPY;  Surgeon: Tanvi Del Valle MD;  Location: [facility] ENDOSCOPY;  Service: Gastroenterology;  Laterality: N/A;  gastritis, esophagitis    FEMORAL ARTERY STENT Bilateral     HYSTERECTOMY      LEG THROMBECTOMY/EMBOLECTOMY Left 01/29/2025    Procedure: LEFT LOWER EXTREMITY ANGIOGRAM;  Surgeon: Jaden Castañeda MD;  Location: [facility] HYBRID OR;  Service: Vascular;  Laterality: Left;    LUNG BIOPSY Left 2005    lobectomy upper lung caner    LUNG VOLUME REDUCTION      OTHER SURGICAL HISTORY      artifical joints/limbs    REPLACEMENT TOTAL KNEE Left 2016    SHUNT O GRAM N/A 01/13/2025    Procedure: dialysis shuntogram;  Surgeon: Court Valente MD;  Location: [facility] CATH INVASIVE LOCATION;  Service: Peripheral Vascular;  Laterality: N/A;    UPPER GASTROINTESTINAL ENDOSCOPY         Family History: family history includes Arthritis in her father and mother; Cancer in her brother, brother, brother, and brother; Diabetes in her brother; Other in her brother; Prostate cancer in her brother and brother. Otherwise pertinent FHx was reviewed and not pertinent to current issue.    Social History:  reports that she quit smoking about 21 years ago. Her smoking use included cigarettes. She started smoking about 73 years ago. She has a 52.5 pack-year smoking history. She has been exposed to tobacco smoke. She has never used smokeless tobacco. She reports that she does not currently use alcohol. She reports that she does not use drugs.    Home  Medications:   Methoxy PEG-Epoetin Beta, acetaminophen, albuterol sulfate HFA, aspirin, atorvastatin, budesonide, carvedilol, clopidogrel, ferrous gluconate, fluticasone, furosemide, ipratropium-albuterol, isosorbide mononitrate, levothyroxine, lidocaine-prilocaine, losartan, menthol-zinc oxide, omeprazole, tobramycin PF, and vitamin D    Allergies:  No Known Allergies    Objective    Objective     Vitals:  Temp:  [97.5 °F (36.4 °C)-98.8 °F (37.1 °C)] 98.8 °F (37.1 °C)  Heart Rate:  [70-99] 70  Resp:  [8-22] 18  BP: (128-195)/(44-79) 134/49  Flow (L/min) (Oxygen Therapy):  [4] 4    Physical Exam  Frail  Cachectic  Chronically ill in appearance  Faint expiratory wheezing  Result Review    Result Review:  I have personally reviewed the results from the time of this admission to 7/25/2025 17:56 EDT and agree with these findings:  []  Laboratory  []  Microbiology  []  Radiology  []  EKG/Telemetry   []  Cardiology/Vascular   []  Pathology  []  Old records  []  Other:    Most notable findings include:     CT Chest Without Contrast Diagnostic  Result Date: 7/24/2025  CT CHEST WO CONTRAST DIAGNOSTIC Date of Exam: 7/24/2025 2:14 PM EDT Indication: pneumonia. History of lung cancer Comparison: Chest x-ray July 23, 2025 Technique: Axial CT images were obtained of the chest without contrast administration.  Reconstructed coronal and sagittal images were also obtained. Automated exposure control and iterative construction methods were used. Findings: There is atherosclerotic vascular calcification. There is coronary artery calcification. There is volume loss to the left lung secondary to prior left upper lobe lobectomy. There is some pleural-based calcification left hemithorax. There is suggested scarring in the left lower lobe. There are changes in the right perihilar region extending to the pleura in the right upper lobe posteriorly that could reflect treatment related change. This has been noted. In the interval since prior  exam there is airspace disease which has developed in the right lower lobe. This may be treatment related. Inflammatory infectious process could not be excluded. There is some scarring at the right base. There is some new pleural-based density in the right basilar area that may reflect sequela to treatment. This is best defined on image [id number]. Lower slices through the upper abdomen reveal no acute changes. There is a central venous catheter with its tip at the caval atrial junction. Visualized osseous structures do not appear unusual for the given patient.     Impression: Impression: 1.There has been interval change to the right lower chest that may reflect posttreatment pneumonitis. An underlying inflammatory infectious process not excluded. 2.Pleural-based density in the right basilar area which seems changed and may be reflective of posttreatment change to this area 3.Posttreatment changes right perihilar area in right upper lobe. 4.Changes from left upper lobe lobectomy. There is scarring in the left lower lobe 5.Atherosclerotic vascular calcification is present. Electronically Signed: Tony Kurtz MD  7/24/2025 3:20 PM EDT  Workstation ID: [id number]        Assessment & Plan   Assessment / Plan     Brief Patient Summary:  Charlette Rai is a 89 y.o. female who presents with worsening shortness of breath and history of left upper lobe lobectomy    Active Hospital Problems:  Active Hospital Problems    Diagnosis     **Acute respiratory failure        Plan:   CT scan reviewed  There is no evidence of any active pneumonia  Continue nebulized tobramycin for pseudomonal colonization  Okay from a standpoint to discontinue systemic antibiotics as I do not feel that she has any acute infectious process  Patient's right lower lobe airspace disease likely represents pneumonitis and post radiation changes  Continue with Solu-Medrol  Continue with airway clearance  Start patient Jessica Pulakin  Suspect having extra  dialysis with fluid removal will help her shortness of breath        Electronically signed by DAT Marcelino, 07/25/25, 6:01 PM EDT.  I personally seen  examined this patient and the medical decision making and assessment and plan reflect my and I assume responsibility for the care of this patient    Electronically signed by Regis Holcomb DO, 07/25/25, 5:56 PM EDT.

## 2025-07-25 NOTE — PROGRESS NOTES
[facility]   Hospitalist Progress Note  Date: 2025  Patient Name: Charlette Rai  : 1935  MRN: [id number]  Date of admission: 2025  Consultants:   -Nephrology: Dr. Elliott Aviles, Dr. Edi García  -Pulmonology: Dr. Regis Holcomb    Subjective   Subjective     Chief Complaint: Shortness of breath    Summary:   Charlette Rai is a 89 y.o. female with PAD, renal artery stenosis s/p stenting, hypertension, ESRD on hemodialysis, chronic diastolic heart failure, type 2 diabetes mellitus and hypothyroidism presented with complaints of shortness of breath.  CXR obtained and no acute abnormalities were noted.  Patient placed on supplemental O2 to maintain sats.  CT abdomen pelvis did show right lower lobe pneumonia.  Antibiotic started.  Nephrology and pulmonology consulted.    Interval Followup:   Patient seen while in dialysis.  Has some complaints of lower back pain and discomfort.  Shortness of breath seems to be stable today.  No chest pain.  States that she feels weak and tired.  Nursing with no additional acute issues to report.    Antibiotics:   - Unasyn    Objective   Objective     Vitals:   Temp:  [97.5 °F (36.4 °C)-98.2 °F (36.8 °C)] 98.2 °F (36.8 °C)  Heart Rate:  [73-99] 73  Resp:  [8-20] 20  BP: (156-195)/(44-79) 185/63  Flow (L/min) (Oxygen Therapy):  [4] 4  Physical Exam   Gen: Frail chronically ill-appearing elderly female, sitting up in bed on dialysis, conversant  Resp: Diminished breath sounds, normal respiratory effort  Card: RRR, No m/r/g  Abd: Soft, Nontender, Nondistended, + bowel sounds    Result Review    Result Review:  I have personally reviewed the results as below and agree with these findings:  []  Laboratory:   CMP          2025    19:30 2025    05:10 2025    05:10   CMP   Glucose 129  143  178    BUN 25.1  29.0  28.7    Creatinine 2.61  2.48  2.16    EGFR 17.2  18.3  21.5    Sodium 143  140  138    Potassium 4.5  4.9  4.1    Chloride 107  109  104     Calcium 8.1  8.1  7.8    Total Protein 6.1      Albumin 2.9      Globulin 3.2      Total Bilirubin 0.5      Alkaline Phosphatase 335      AST (SGOT) 69      ALT (SGPT) 90      Albumin/Globulin Ratio 0.9      BUN/Creatinine Ratio 9.6  11.7  13.3    Anion Gap 10.7  11.7  10.1      CBC          7/23/2025    10:01 7/23/2025    19:30 7/24/2025    05:10 7/25/2025    05:10   CBC   WBC  5.90  6.26  5.49    RBC  3.73  3.89  3.59    Hemoglobin 7.9  11.9  12.3  11.5    Hematocrit 27.4  39.3  41.1  37.6    MCV  105.4  105.7  104.7    MCH  31.9  31.6  32.0    MCHC  30.3  29.9  30.6    RDW  22.0  21.8  21.1    Platelets  160  139  133    Phosphorus and magnesium within normal limits  [x]  Microbiology: Blood culture (07/24/2025): No growth to date.  [x]  Radiology: CT chest imaging personally reviewed.  Density in right basilar area noted.  [x]  EKG/Telemetry:    []  Cardiology/Vascular:    []  Pathology:  []  Old records:  []  Other:    Assessment & Plan   Assessment / Plan     Assessment:  Community-acquired pneumonia due to unknown infectious organism  Shortness of breath secondary to above and likely volume overload  ESRD on hemodialysis  Generalized weakness  Chronic hypoxic respiratory failure  Anemia of chronic disease  Hypertension  Chronic diastolic heart failure, not acutely exacerbated  Severe protein calorie malnutrition  History of renal artery stenosis  Hypothyroidism    Plan:  -Nephrology and pulmonology consulted and following, appreciate assistance and recommendations in the care of this patient.  -Continue Unasyn.  Tailor further based on results of infectious workup  -Continue supplemental O2 to maintain sats greater than 90%, wean as tolerated  -Dialysis per nephrology.  -Continue Brovana, Pulmicort  -Continue JUAN nebs  -Continue IV Solu-Medrol  -Encourage use of incentive spirometer and flutter valve  -Monitor electrolytes and renal function with BMP and magnesium level in the AM  -Monitor WBC and Hgb with  CBC in the AM  -Clinical course will dictate further management     DVT Prophylaxis: SCDs, heparin  GI Prophylaxis: Pantoprazole  Diet:   Diet Order   Procedures    Diet: Renal; Low Sodium (2-3g), Low Potassium, Low Phosphorus; Texture: Soft to Chew (NDD 3); Soft to Chew: Whole Meat; Fluid Consistency: Thin (IDDSI 0)     Dispo: PT/OT consulted     Time spent personally reviewing patient's chart, labs and imaging, evaluating/examining the patient, discussing care plan with patient and nurse at bedside and discussing management with consultants (Nephrology and Pulmonology): 51 minutes.     Part of this note may be an electronic transcription/translation of spoken language to printed text using the Dragon dictation system.    VTE Prophylaxis:  Pharmacologic & mechanical VTE prophylaxis orders are present.        CODE STATUS:   Code Status (Patient has no pulse and is not breathing): No CPR (Do Not Attempt to Resuscitate)  Medical Interventions (Patient has pulse or is breathing): Limited Support  Medical Intervention Limits: No intubation (DNI)        Electronically signed by Pieter Salgado MD, 7/25/2025, 11:32 EDT.

## 2025-07-25 NOTE — PLAN OF CARE
Goal Outcome Evaluation:  Plan of Care Reviewed With: patient        Progress: no change (first session for evaluation)  Outcome Evaluation: Patient presents with limitations of balance, strength and endurance/activity tolerance which are impacting ADL/transfer independence. Skilled OT services are indicated to remediate/ compensate for deficits to maximize independence and safety with functional ADL tasks    Anticipated Discharge Disposition (OT):  (return to sub-acute rehab)

## 2025-07-25 NOTE — NURSING NOTE
Duration of Treatment 3.0 Hours   Access Site Tunneled Dialysis Catheter   Dialyzer Revaclear    mL/min   Dialysate Temperature (C) 36   BFR-As tolerated to a maximum of: 400 mL/min   Dialysate Solution Bath: K+ = 3 mEq, CA = 2.5mg   Bicarb 35 mEq   Na+ 137 meq   Fluid Removal: 3L   Pt tolerated treatment well. Some Shortness of air noted, nebulizer treatment completed. Pt requested another during time on unit. Informed pt too soon to receive. Ran 3hrs 30 min, removed 3L, and BVP 84L. Report given to BILL Sandoval. Last bp 155/79, Pt to rm 419 via transport team

## 2025-07-26 ENCOUNTER — APPOINTMENT (OUTPATIENT)
Dept: NEPHROLOGY | Facility: HOSPITAL | Age: 89
DRG: 205 | End: 2025-07-26
Payer: MEDICARE

## 2025-07-26 LAB
ANION GAP SERPL CALCULATED.3IONS-SCNC: 8.1 MMOL/L (ref 5–15)
BUN SERPL-MCNC: 20.5 MG/DL (ref 8–23)
BUN/CREAT SERPL: 12.8 (ref 7–25)
CALCIUM SPEC-SCNC: 7.8 MG/DL (ref 8.6–10.5)
CHLORIDE SERPL-SCNC: 100 MMOL/L (ref 98–107)
CO2 SERPL-SCNC: 23.9 MMOL/L (ref 22–29)
CREAT SERPL-MCNC: 1.6 MG/DL (ref 0.57–1)
DEPRECATED RDW RBC AUTO: 79 FL (ref 37–54)
EGFRCR SERPLBLD CKD-EPI 2021: 30.9 ML/MIN/1.73
ERYTHROCYTE [DISTWIDTH] IN BLOOD BY AUTOMATED COUNT: 20.5 % (ref 12.3–15.4)
GLUCOSE SERPL-MCNC: 161 MG/DL (ref 65–99)
HCT VFR BLD AUTO: 39.4 % (ref 34–46.6)
HGB BLD-MCNC: 12.3 G/DL (ref 12–15.9)
MAGNESIUM SERPL-MCNC: 1.9 MG/DL (ref 1.6–2.4)
MCH RBC QN AUTO: 32.5 PG (ref 26.6–33)
MCHC RBC AUTO-ENTMCNC: 31.2 G/DL (ref 31.5–35.7)
MCV RBC AUTO: 104 FL (ref 79–97)
PHOSPHATE SERPL-MCNC: 2.7 MG/DL (ref 2.5–4.5)
PLATELET # BLD AUTO: 118 10*3/MM3 (ref 140–450)
PMV BLD AUTO: 9.5 FL (ref 6–12)
POTASSIUM SERPL-SCNC: 4 MMOL/L (ref 3.5–5.2)
RBC # BLD AUTO: 3.79 10*6/MM3 (ref 3.77–5.28)
SODIUM SERPL-SCNC: 132 MMOL/L (ref 136–145)
WBC NRBC COR # BLD AUTO: 4.51 10*3/MM3 (ref 3.4–10.8)

## 2025-07-26 PROCEDURE — 25010000002 METHYLPREDNISOLONE PER 40 MG: Performed by: HOSPITALIST

## 2025-07-26 PROCEDURE — 94799 UNLISTED PULMONARY SVC/PX: CPT

## 2025-07-26 PROCEDURE — 25010000002 HEPARIN (PORCINE) PER 1000 UNITS: Performed by: INTERNAL MEDICINE

## 2025-07-26 PROCEDURE — 80048 BASIC METABOLIC PNL TOTAL CA: CPT | Performed by: INTERNAL MEDICINE

## 2025-07-26 PROCEDURE — 83735 ASSAY OF MAGNESIUM: CPT | Performed by: INTERNAL MEDICINE

## 2025-07-26 PROCEDURE — 99233 SBSQ HOSP IP/OBS HIGH 50: CPT | Performed by: INTERNAL MEDICINE

## 2025-07-26 PROCEDURE — 94760 N-INVAS EAR/PLS OXIMETRY 1: CPT

## 2025-07-26 PROCEDURE — 94664 DEMO&/EVAL PT USE INHALER: CPT

## 2025-07-26 PROCEDURE — 84100 ASSAY OF PHOSPHORUS: CPT | Performed by: INTERNAL MEDICINE

## 2025-07-26 PROCEDURE — 25010000002 ONDANSETRON PER 1 MG: Performed by: HOSPITALIST

## 2025-07-26 PROCEDURE — 85027 COMPLETE CBC AUTOMATED: CPT | Performed by: INTERNAL MEDICINE

## 2025-07-26 RX ADMIN — LOSARTAN POTASSIUM 50 MG: 50 TABLET, FILM COATED ORAL at 13:23

## 2025-07-26 RX ADMIN — ONDANSETRON 4 MG: 2 INJECTION INTRAMUSCULAR; INTRAVENOUS at 11:43

## 2025-07-26 RX ADMIN — ACETAMINOPHEN 650 MG: 325 TABLET ORAL at 13:27

## 2025-07-26 RX ADMIN — CARVEDILOL 6.25 MG: 6.25 TABLET, FILM COATED ORAL at 17:04

## 2025-07-26 RX ADMIN — ACETAMINOPHEN 650 MG: 325 TABLET ORAL at 19:21

## 2025-07-26 RX ADMIN — ARFORMOTEROL TARTRATE 15 MCG: 15 SOLUTION RESPIRATORY (INHALATION) at 10:18

## 2025-07-26 RX ADMIN — Medication 5 MG: at 21:50

## 2025-07-26 RX ADMIN — Medication 10 ML: at 13:32

## 2025-07-26 RX ADMIN — ASPIRIN 81 MG: 81 TABLET, COATED ORAL at 13:23

## 2025-07-26 RX ADMIN — METHYLPREDNISOLONE SODIUM SUCCINATE 40 MG: 40 INJECTION, POWDER, FOR SOLUTION INTRAMUSCULAR; INTRAVENOUS at 13:23

## 2025-07-26 RX ADMIN — ROSUVASTATIN CALCIUM 10 MG: 5 TABLET, FILM COATED ORAL at 21:49

## 2025-07-26 RX ADMIN — TROLAMINE SALICYLATE 1 APPLICATION: 100 CREAM TOPICAL at 05:41

## 2025-07-26 RX ADMIN — TROLAMINE SALICYLATE 1 APPLICATION: 100 CREAM TOPICAL at 21:57

## 2025-07-26 RX ADMIN — BUDESONIDE 0.5 MG: 0.5 SUSPENSION RESPIRATORY (INHALATION) at 22:26

## 2025-07-26 RX ADMIN — FLUTICASONE PROPIONATE 1 SPRAY: 50 SPRAY, METERED NASAL at 14:14

## 2025-07-26 RX ADMIN — LEVOTHYROXINE SODIUM 50 MCG: 0.05 TABLET ORAL at 07:44

## 2025-07-26 RX ADMIN — PANTOPRAZOLE SODIUM 40 MG: 40 TABLET, DELAYED RELEASE ORAL at 05:40

## 2025-07-26 RX ADMIN — IPRATROPIUM BROMIDE AND ALBUTEROL SULFATE 3 ML: .5; 3 SOLUTION RESPIRATORY (INHALATION) at 17:30

## 2025-07-26 RX ADMIN — TOBRAMYCIN 300 MG: 300 SOLUTION RESPIRATORY (INHALATION) at 10:19

## 2025-07-26 RX ADMIN — TROLAMINE SALICYLATE 1 APPLICATION: 100 CREAM TOPICAL at 13:28

## 2025-07-26 RX ADMIN — TOBRAMYCIN 300 MG: 300 SOLUTION RESPIRATORY (INHALATION) at 22:25

## 2025-07-26 RX ADMIN — CAPSAICIN 1 APPLICATION: 0.75 CREAM TOPICAL at 13:28

## 2025-07-26 RX ADMIN — FERROUS SULFATE TAB 325 MG (65 MG ELEMENTAL FE) 325 MG: 325 (65 FE) TAB at 13:23

## 2025-07-26 RX ADMIN — ISOSORBIDE MONONITRATE 30 MG: 30 TABLET, EXTENDED RELEASE ORAL at 21:49

## 2025-07-26 RX ADMIN — CLOPIDOGREL BISULFATE 75 MG: 75 TABLET, FILM COATED ORAL at 13:23

## 2025-07-26 RX ADMIN — HEPARIN SODIUM 5000 UNITS: 5000 INJECTION INTRAVENOUS; SUBCUTANEOUS at 05:40

## 2025-07-26 RX ADMIN — METHYLPREDNISOLONE SODIUM SUCCINATE 40 MG: 40 INJECTION, POWDER, FOR SOLUTION INTRAMUSCULAR; INTRAVENOUS at 21:49

## 2025-07-26 RX ADMIN — Medication 10 ML: at 21:57

## 2025-07-26 RX ADMIN — HEPARIN SODIUM 3200 UNITS: 1000 INJECTION INTRAVENOUS; SUBCUTANEOUS at 12:40

## 2025-07-26 RX ADMIN — HEPARIN SODIUM 5000 UNITS: 5000 INJECTION INTRAVENOUS; SUBCUTANEOUS at 21:49

## 2025-07-26 RX ADMIN — ARFORMOTEROL TARTRATE 15 MCG: 15 SOLUTION RESPIRATORY (INHALATION) at 22:26

## 2025-07-26 RX ADMIN — BUDESONIDE 0.5 MG: 0.5 SUSPENSION RESPIRATORY (INHALATION) at 10:18

## 2025-07-26 NOTE — PROGRESS NOTES
[facility]     Nephrology Progress Note      Patient Name: Charlette Rai  : 1935  MRN: [id number]  Primary Care Physician:  Brennan Mosqueda MD  Date of admission: 2025    Subjective   Subjective     Interval History:  Seen on dialysis  Feeling better  On 3 K bath  Blood flow 400  UF 3 Liters  No cramps  No N/V  Reviewed orders with nurses    Review of Systems   All systems were reviewed and negative except for: What is mentioned above.    Objective   Objective     Vitals:   Temp:  [97.3 °F (36.3 °C)-98.8 °F (37.1 °C)] 98.3 °F (36.8 °C)  Heart Rate:  [70-77] 71  Resp:  [12-20] 16  BP: (129-188)/(48-79) 135/61  Flow (L/min) (Oxygen Therapy):  [4] 4  Physical Exam:   Constitutional: Awake, alert   Eyes: sclerae anicteric, no conjunctival injection   HENT: mucous membranes moist   Neck: Supple, no thyromegaly, no lymphadenopathy, trachea midline, No JVD   Respiratory: Decreased breath sounds bilaterally, upper airway rhonchi, no wheezing.      Cardiovascular: RRR, no murmurs, rubs, or gallops.   Gastrointestinal: Positive bowel sounds, soft, nontender, nondistended   Musculoskeletal: trace edema, no clubbing or cyanosis   Psychiatric: Appropriate affect, cooperative   Neurologic: Oriented x 3, moving all extremities, Cranial Nerves grossly intact, speech clear   Skin: warm and dry, no rashes    Right IJ TDC in place.    Result Review    Result Reviewed:  I have personally reviewed the results from the time of this admission to 2025 11:22 EDT and agree with these findings:  [x]  Laboratory  []  Microbiology  [x]  Radiology  []  EKG/Telemetry   []  Cardiology/Vascular   []  Pathology  [x]  Old records  []  Other:        Lab 25  0504 25  0510 25  0510 25  1930   SODIUM 132* 138 140 143   POTASSIUM 4.0 4.1 4.9 4.5   CHLORIDE 100 104 109* 107   CO2 23.9 23.9 19.3* 25.3   BUN 20.5 28.7* 29.0* 25.1*   CREATININE 1.60* 2.16* 2.48* 2.61*   GLUCOSE 161* 178* 143* 129*    EGFR 30.9* 21.5* 18.3* 17.2*   ANION GAP 8.1 10.1 11.7 10.7   MAGNESIUM 1.9 2.0 1.9 2.4   PHOSPHORUS 2.7 4.1 4.8*  --            Most notable findings include: As above.  CT of the chest noted.    Assessment & Plan   Assessment / Plan       Active Hospital Problems:  Active Hospital Problems    Diagnosis     **Acute respiratory failure    ESRD  Renal artery stenosis  Severe hypertension  Anemia of CKD    Assessment and Plan:      - ESRD, dialysis through right IJ TDC 3 times a week.  Here with acute respiratory failure, pneumonia and volume overload.  Had dialysis last 2 days, seen on HD again today  Next HD  MWF schedule next week.    - Acute respiratory failure secondary to volume overload and possible right lower lobe pneumonia.  Pulmonary consulted.    - Anemia of CKD.  Hemoglobin above goal.  Was on CASSANDRA . Resume when hemoglobin closer to 10.  Continue oral iron.  Monitor.     - Secondary hyperparathyroidism and hyperphosphatemia, being addressed with dialysis.  Low phosphorus diet.       - Severe peripheral arterial disease including lower extremities and renal artery stenosis, status post PTA and stent 6/14/2024.       - Lung cancer.  Status post radiation.     - Type 2 diabetes with complications, per primary.    Discussed with dialysis nurse.     Electronically signed by Karlo Guevara MD, 7/26/2025, 11:22 EDT.    [phone]

## 2025-07-26 NOTE — PLAN OF CARE
Goal Outcome Evaluation:  Plan of Care Reviewed With: patient            VSS during shift. Patient A/O and pleasant. Reports of back/sacrum pain, treated per MAR. Dialysis completed. Family updated at bedside. Continue plan of care.

## 2025-07-26 NOTE — PLAN OF CARE
Goal Outcome Evaluation:              Outcome Evaluation: Patient alert and oriented x4 on 3L NC. Medications tolerated well. Continue care per Plan of Care.

## 2025-07-26 NOTE — NURSING NOTE
Duration of Treatment 3.5 Hours   Access Site Tunneled Dialysis Catheter   Dialyzer Revaclear    mL/min   Dialysate Temperature (C) 36   BFR-As tolerated to a maximum of: 400 mL/min   Prime Dialyzer With NS to Priming Volume? Yes   Dialysate Solution Bath: K+ = 3 mEq, Ca = 2.5mEq   Bicarb 35 mEq   Na+ 137 meq   Fluid Removal: 2.5L-3L   Pt tolerated mos to f treatment well. Pt experienced abdominal cramping and nausea towards end of treatment. Zofran given for nausea. Lowered goal to 2.5L as per order parameters. Last bp 165/70. Ran 3hr 30 minutes. . Removed 2.5L, BVP 84L. Post treatment pt denied any nausea or cramping. Report given to BILL Plama. Pt to rm 419 via transport team

## 2025-07-26 NOTE — PROGRESS NOTES
Primary Care Provider  Brennan Mosqueda MD     Referring Provider  Pieter Salgado MD     Chief Complaint  Shortness of Breath    Subjective          Charlette Rai presents to [facility] 4TH FLOOR MEDICAL TELEMETRY UNIT  History of Present Illness  Charlette Rai is a 89 y.o. female patient   History of Present Illness  The patient presents for evaluation of worsening shortness of breath.    She reports no significant improvement in her condition. She is not experiencing any chest pain or coughing up anything. She has a history of Pseudomonas and is currently on telemetry. She is on antibiotics. She has been dialyzing, removing 4 liters every day, and her oxygen saturation levels are between 99 to 100 percent on room air. Her BNP levels were not significantly elevated, but imaging revealed more edema. She has a history of lobectomy of the left lobe.    Review of Systems     General:  No Fatigue, No Fever No weight loss or loss of appetite  HEENT: No dysphagia, No Visual Changes, no rhinorrhea  Respiratory:  + cough,+Dyspnea, intermittent phlegm, No Pleuritic Pain, no wheezing, no hemoptysis.  Cardiovascular: Denies chest pain, denies palpitations,+HAIDER, No Chest Pressure  Gastrointestinal:  No Abdominal Pain, No Nausea, No Vomiting, No Diarrhea  Genitourinary:  No Dysuria, No Frequency, No Hesitancy  Musculoskeletal: No muscle pain or swelling  Endocrine:  No Heat Intolerance, No Cold Intolerance  Hematologic:  No Bleeding, No Bruising  Psychiatric:  No Anxiety, No Depression  Neurologic:  No Confusion, no Dysarthria, No Headaches  Skin:  No Rash, No Open Wounds    Family History   Problem Relation Age of Onset    Arthritis Mother     Arthritis Father     Cancer Brother     Diabetes Brother     Other Brother         blood disease     Prostate cancer Brother     Cancer Brother     Prostate cancer Brother     Cancer Brother     Cancer Brother     Malig Hyperthermia Neg Hx     Colon cancer Neg Hx          Social History     Socioeconomic History    Marital status:    Tobacco Use    Smoking status: Former     Current packs/day: 0.00     Average packs/day: 1 pack/day for 52.5 years (52.5 ttl pk-yrs)     Types: Cigarettes     Start date:      Quit date: 2004     Years since quittin.1     Passive exposure: Past    Smokeless tobacco: Never   Vaping Use    Vaping status: Never Used   Substance and Sexual Activity    Alcohol use: Not Currently     Comment: beer many years ago    Drug use: Never    Sexual activity: Defer        Past Medical History:   Diagnosis Date    Allergic rhinitis     Anaphylactic shock, unspecified, initial encounter 2023    Anemia     Arthritis     Asthma     USE INHALERS AND NEBULIZERS    Back pain     Bladder disorder     Cancer     LEFT LUNG CANCER  SURGERY AND CHEMO DONE  AND CURRENTLY   RIGHT LUNG CANCER HAS ONLY RECEIVED RADIATION THUS FAR    Chronic kidney disease     stage 3    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 DENIES CP BUT DOES HAVE SOA CHRONIC ISSUE COPD/LUNG CANCER    COPD (chronic obstructive pulmonary disease)     FOLLOWED BY DR MARIAJOSE BAILEY    Coronary artery disease     DENIES CP BUT DOES GET SOA MOST OF THE TIME WITH EXERTION BUT OCC AT REST CHRONIC ISSUE COPD/LUNG CANCER    Deep vein thrombosis     Diabetes mellitus     DOES NOT CHECK BS DAILY    Disease of thyroid gland     HYPOTHYROIDISM    Essential hypertension     Gastric ulcer     GERD (gastroesophageal reflux disease)     Heart murmur     History of transfusion     NO ISSUES POST TRANSFUSION WAS MANY YEARS AGO    Hyperlipemia     Leukocytopenia     FOLLOWED BY DR MIR BAILEY    Limb swelling     Lumbago     Lumbar spinal stenosis     Lung cancer     Migraine headache     Multiple joint pain     On home oxygen  therapy     4L/NC PRN    Osteopenia     PNA (pneumonia) 05/04/2024    Pseudomonas pneumonia 04/29/2024    Reflux esophagitis     Shortness of breath     Thyroid nodule     HAS ULTRASOUND YEARLY BEING MONITORED    Vascular disease     Vitamin D deficiency         Immunization History   Administered Date(s) Administered    ABRYSVO (RSV, 60+ or pregnant women 32-36 wks) 09/19/2024    COVID-19 (PFIZER) 12YRS+ (COMIRNATY) 10/19/2023, 10/10/2024    COVID-19 (PFIZER) BIVALENT 12+YRS 10/13/2022    COVID-19 (PFIZER) Purple Cap Monovalent 03/07/2021, 04/04/2021, 10/31/2021    FLUAD TRI 65YR+ 10/10/2024    Fluad Quad 65+ 10/19/2023    Fluzone (or Fluarix & Flulaval for VFC) >6mos 09/21/2016    Fluzone High-Dose 65+YRS 10/09/2018, 10/15/2020, 10/12/2021, 09/19/2022    Hepatitis B 2 Dose Vaccine Heplisav-B 08/28/2024, 09/23/2024, 10/23/2024, 12/23/2024, 03/26/2025, 04/30/2025, 05/28/2025    Influenza Seasonal Injectable 10/01/2017, 10/01/2018    Influenza, Unspecified 10/13/2021    Pneumococcal Conjugate 13-Valent (PCV13) 10/21/2015    Pneumococcal Conjugate 20-Valent (PCV20) 08/30/2024    Pneumococcal Polysaccharide (PPSV23) 10/30/2003, 11/08/2016         No Known Allergies       Current Facility-Administered Medications:     acetaminophen (TYLENOL) tablet 650 mg, 650 mg, Oral, Q6H PRN, Patterson, Dimpi, DO, 650 mg at 07/25/25 1637    ampicillin-sulbactam (UNASYN) 3 g in sodium chloride 0.9 % 100 mL IVPB-VTB, 3 g, Intravenous, Q24H, Pieter Salgado MD, 3 g at 07/25/25 1637    arformoterol (BROVANA) nebulizer solution 15 mcg, 15 mcg, Nebulization, BID - RT, Patterson, Dimpi, DO, 15 mcg at 07/25/25 2058    aspirin EC tablet 81 mg, 81 mg, Oral, Daily, Maren Patterson, DO, 81 mg at 07/25/25 0801    sennosides-docusate (PERICOLACE) 8.6-50 MG per tablet 2 tablet, 2 tablet, Oral, BID PRN **AND** polyethylene glycol (MIRALAX) packet 17 g, 17 g, Oral, Daily PRN **AND** bisacodyl (DULCOLAX) EC tablet 5 mg, 5 mg, Oral, Daily PRN **AND** bisacodyl  (DULCOLAX) suppository 10 mg, 10 mg, Rectal, Daily PRN, Patterson, Dimpi, DO    budesonide (PULMICORT) nebulizer solution 0.5 mg, 0.5 mg, Nebulization, BID - RT, Patterson, Dimpi, DO, 0.5 mg at 07/25/25 2058    capsaicin (ZOSTRIX) 0.075 % topical cream 1 Application, 1 Application, Topical, Q8H, Patterson, Dimpi, DO, 1 Application at 07/25/25 2214    carvedilol (COREG) tablet 6.25 mg, 6.25 mg, Oral, BID With Meals, Patterson, Dimpi, DO, 6.25 mg at 07/25/25 1804    clopidogrel (PLAVIX) tablet 75 mg, 75 mg, Oral, Daily, Patterson, Dimpi, DO, 75 mg at 07/25/25 0801    ferrous sulfate tablet 325 mg, 325 mg, Oral, Daily With Breakfast, Patterson, Dimpi, DO, 325 mg at 07/25/25 0801    fluticasone (FLONASE) 50 MCG/ACT nasal spray 1 spray, 1 spray, Nasal, Daily, Patterson, Dimpi, DO    heparin (porcine) 5000 UNIT/ML injection 5,000 Units, 5,000 Units, Subcutaneous, Q8H, Pieter Salgado MD, 5,000 Units at 07/26/25 0540    heparin (porcine) injection 3,200 Units, 3,200 Units, Intracatheter, PRN, Elliott Aviles MD, 3,200 Units at 07/25/25 1225    ipratropium-albuterol (DUO-NEB) nebulizer solution 3 mL, 3 mL, Nebulization, Q4H PRN, Patterson, Dimpi, DO, 3 mL at 07/25/25 1816    isosorbide mononitrate (IMDUR) 24 hr tablet 30 mg, 30 mg, Oral, Nightly, Patterson, Dimpi, DO, 30 mg at 07/25/25 2213    levothyroxine (SYNTHROID, LEVOTHROID) tablet 50 mcg, 50 mcg, Oral, QAM AC, Patterson, Dimpi, DO, 50 mcg at 07/26/25 0744    Lidocaine 4 % 1 patch, 1 patch, Transdermal, Q24H, Pieter Salgado MD, 1 patch at 07/25/25 1152    losartan (COZAAR) tablet 50 mg, 50 mg, Oral, Q24H, Pieter Salgado MD    melatonin tablet 5 mg, 5 mg, Oral, Nightly, Patterson, Dimpi, DO, 5 mg at 07/25/25 2213    methylPREDNISolone sodium succinate (SOLU-Medrol) injection 40 mg, 40 mg, Intravenous, Q12H, Patterson, Dimpi, DO, 40 mg at 07/25/25 2213    ondansetron ODT (ZOFRAN-ODT) disintegrating tablet 4 mg, 4 mg, Oral, Q6H PRN **OR** ondansetron (ZOFRAN) injection 4 mg, 4 mg, Intravenous, Q6H PRN, Patterson, Dimpi,  "DO    pantoprazole (PROTONIX) EC tablet 40 mg, 40 mg, Oral, Q AM, Patterson, Dimpi, DO, 40 mg at 07/26/25 0540    rosuvastatin (CRESTOR) tablet 10 mg, 10 mg, Oral, Nightly, Pieter Salgado MD, 10 mg at 07/25/25 2213    sodium chloride 0.9 % flush 10 mL, 10 mL, Intravenous, Q12H, Patterson, Dimpi, DO, 10 mL at 07/25/25 2215    sodium chloride 0.9 % flush 10 mL, 10 mL, Intravenous, PRN, Patterson, Dimpi, DO, 10 mL at 07/24/25 1502    sodium chloride 0.9 % infusion 40 mL, 40 mL, Intravenous, PRN, Patterson, Dimpi, DO    tobramycin PF (JUAN) nebulizer solution 300 mg, 300 mg, Nebulization, BID - RT, Patterson, Dimpi, DO, 300 mg at 07/25/25 2058    trolamine salicylate (ASPERCREME) 10 % cream 1 Application, 1 Application, Topical, Q8H, Patterson, Dimpi, DO, 1 Application at 07/26/25 0541     Objective   Physical Exam  Physical Exam  Vital Signs: Oxygen saturation is 99 to 100% on room air.  Lungs: Scattered crackles noted bilaterally.    Vital Signs:   BP (!) 183/58 (BP Location: Right arm, Patient Position: Lying)   Pulse 72   Temp 97.3 °F (36.3 °C) (Oral)   Resp 16   Ht 165.1 cm (65\")   Wt 65 kg (143 lb 4.8 oz)   SpO2 99%   BMI 23.85 kg/m²       Vital Signs Reviewed  WDWN, Alert, NAD.   HEENT:  PERRL, EOMI.  OP, nares clear, no sinus tenderness  Mallampatti classification : 1  Neck:  Supple, no JVD, no thyromegaly  Lymph: no axillary, cervical, supraclavicular lymphadenopathy noted bilaterally  Chest:  good aeration, bilateral diminished breath sounds, no wheezing, crackles or rhonchi, resonant to percussion b/l  CV: RRR, no MGR, pulses 2+, equal.  Abd:  Soft, NT, ND, + BS, no HSM  EXT:  no clubbing, no cyanosis, No BLE edema  Neuro:  A&Ox3, CN grossly intact, no focal deficits.  Skin: No rashes or lesions noted     Result Review :            No results found for: \"SITE\", \"ALLENTEST\", \"PHART\", \"ROQ5WNS\", \"PO2ART\", \"ACQ2NRA\", \"BASEEXCESS\", \"F9NICEIF\", \"HGBBG\", \"HCTABG\", \"OXYHEMOGLOBI\", \"METHHGBN\", \"CARBOXYHGB\", \"CO2CT\", \"BAROMETRIC\", " "\"MODALITY\", \"FIO2\"   Results for orders placed during the hospital encounter of 04/22/25    Adult Transthoracic Echo Complete W/ Cont if Necessary Per Protocol    Interpretation Summary    Left ventricular systolic function is normal. Calculated left ventricular EF = 60.5%    Left ventricular diastolic function is consistent with (grade Ia w/high LAP) impaired relaxation.    The left atrial cavity is mildly dilated.    Mild to moderate aortic valve stenosis is present.    Aortic valve mean pressure gradient is 16 mmHg.    Mild to moderate mitral valve stenosis is present.    Estimated right ventricular systolic pressure from tricuspid regurgitation is mildly elevated (35-45 mmHg).     CT Chest Without Contrast Diagnostic  Result Date: 7/24/2025  CT CHEST WO CONTRAST DIAGNOSTIC Date of Exam: 7/24/2025 2:14 PM EDT Indication: pneumonia. History of lung cancer Comparison: Chest x-ray July 23, 2025 Technique: Axial CT images were obtained of the chest without contrast administration.  Reconstructed coronal and sagittal images were also obtained. Automated exposure control and iterative construction methods were used. Findings: There is atherosclerotic vascular calcification. There is coronary artery calcification. There is volume loss to the left lung secondary to prior left upper lobe lobectomy. There is some pleural-based calcification left hemithorax. There is suggested scarring in the left lower lobe. There are changes in the right perihilar region extending to the pleura in the right upper lobe posteriorly that could reflect treatment related change. This has been noted. In the interval since prior exam there is airspace disease which has developed in the right lower lobe. This may be treatment related. Inflammatory infectious process could not be excluded. There is some scarring at the right base. There is some new pleural-based density in the right basilar area that may reflect sequela to treatment. This is best " defined on image [id number]. Lower slices through the upper abdomen reveal no acute changes. There is a central venous catheter with its tip at the caval atrial junction. Visualized osseous structures do not appear unusual for the given patient.     Impression: Impression: 1.There has been interval change to the right lower chest that may reflect posttreatment pneumonitis. An underlying inflammatory infectious process not excluded. 2.Pleural-based density in the right basilar area which seems changed and may be reflective of posttreatment change to this area 3.Posttreatment changes right perihilar area in right upper lobe. 4.Changes from left upper lobe lobectomy. There is scarring in the left lower lobe 5.Atherosclerotic vascular calcification is present. Electronically Signed: Tony Kurtz MD  7/24/2025 3:20 PM EDT  Workstation ID: [id number]          Results  Labs   - BNP: Not grossly elevated    Imaging   - Imaging: Shows some scarring versus pneumonitis             Assessment and Plan    Diagnoses and all orders for this visit:    1. Acute respiratory failure, unspecified whether with hypoxia or hypercapnia (Primary)    2. Oropharyngeal dysphagia    3. Decreased activities of daily living (ADL)    4. Difficulty walking    Other orders  -     Cancel: NPO Diet NPO Type: Strict NPO; Standing  -     Undress & Gown; Standing  -     Cardiac Monitoring; Standing  -     Continuous Pulse Oximetry; Standing  -     Cancel: Oxygen Therapy- Nasal Cannula; Titrate 1-6 LPM Per SpO2; 90 - 95%; Standing  -     ECG 12 Lead ED Triage Standing Order; Chest Pain; Standing  -     ECG 12 Lead ED Triage Standing Order; Chest Pain; Standing  -     XR Chest 1 View; Standing  -     Insert Peripheral IV; Standing  -     Discontinue: sodium chloride 0.9 % flush 10 mL  -     [place] Draw; Standing  -     High Sensitivity Troponin T; Standing  -     CBC & Differential; Standing  -     Comprehensive Metabolic Panel; Standing  -     Lipase;  Standing  -     BNP; Standing  -     Magnesium; Standing  -     Discontinue: aspirin chewable tablet 324 mg  -     Cancel: NPO Diet NPO Type: Strict NPO  -     Undress & Gown  -     Cardiac Monitoring  -     Continuous Pulse Oximetry  -     ECG 12 Lead ED Triage Standing Order; Chest Pain  -     XR Chest 1 View  -     Insert Peripheral IV  -     [place] Draw  -     High Sensitivity Troponin T  -     CBC & Differential  -     Comprehensive Metabolic Panel  -     Lipase  -     BNP  -     Magnesium  -     albuterol (PROVENTIL) nebulizer solution 0.083% 2.5 mg/3mL  -     methylPREDNISolone sodium succinate (SOLU-Medrol) 125 mg in sterile water (preservative free) 2 mL  -     High Sensitivity Troponin T 1Hr; Standing  -     High Sensitivity Troponin T 1Hr  -     furosemide (LASIX) injection 60 mg  -     Inpatient Hospitalist Consult; Standing  -     Inpatient Hospitalist Consult  -     Cancel: Inpatient Nephrology Consult; Standing  -     Cancel: Inpatient Nephrology Consult  -     Initiate Observation Status; Standing  -     Initiate Observation Status  -     Inpatient Nephrology Consult; Standing  -     Inpatient Nephrology Consult  -     budesonide (PULMICORT) nebulizer solution 0.5 mg  -     carvedilol (COREG) tablet 6.25 mg  -     clopidogrel (PLAVIX) tablet 75 mg  -     pantoprazole (PROTONIX) EC tablet 40 mg  -     ferrous sulfate tablet 325 mg  -     aspirin EC tablet 81 mg  -     ipratropium-albuterol (DUO-NEB) nebulizer solution 3 mL  -     arformoterol (BROVANA) nebulizer solution 15 mcg  -     fluticasone (FLONASE) 50 MCG/ACT nasal spray 1 spray  -     isosorbide mononitrate (IMDUR) 24 hr tablet 30 mg  -     levothyroxine (SYNTHROID, LEVOTHROID) tablet 50 mcg  -     Discontinue: losartan (COZAAR) tablet 25 mg  -     Discontinue: rosuvastatin (CRESTOR) tablet 10 mg  -     tobramycin PF (JUAN) nebulizer solution 300 mg  -     acetaminophen (TYLENOL) tablet 650 mg  -     Code Status and Medical Interventions: No  CPR (Do Not Attempt to Resuscitate); Limited Support; No intubation (DNI); Standing  -     Vital Signs; Standing  -     Intake & Output; Standing  -     Weigh Patient; Standing  -     Oral Care; Standing  -     Cancel: Basic Metabolic Panel; Standing  -     Cancel: CBC & Differential; Standing  -     Cancel: Magnesium; Standing  -     Cancel: Phosphorus; Standing  -     Insert Peripheral IV; Standing  -     Saline Lock & Maintain IV Access; Standing  -     sodium chloride 0.9 % flush 10 mL  -     sodium chloride 0.9 % flush 10 mL  -     sodium chloride 0.9 % infusion 40 mL  -     ondansetron ODT (ZOFRAN-ODT) disintegrating tablet 4 mg  -     ondansetron (ZOFRAN) injection 4 mg  -     melatonin tablet 5 mg  -     Place Sequential Compression Device; Standing  -     Maintain Sequential Compression Device; Standing  -     sennosides-docusate (PERICOLACE) 8.6-50 MG per tablet 2 tablet  -     polyethylene glycol (MIRALAX) packet 17 g  -     bisacodyl (DULCOLAX) EC tablet 5 mg  -     bisacodyl (DULCOLAX) suppository 10 mg  -     Code Status and Medical Interventions: No CPR (Do Not Attempt to Resuscitate); Limited Support; No intubation (DNI)  -     Dietary Nutrition Supplements Novasource Renal (Nepro); Standing  -     Dietary Nutrition Supplements Novasource Renal (Nepro)  -     Cancel: Diet: Renal; Low Sodium (2-3g), Low Potassium, Low Phosphorus; Texture: Regular (IDDSI 7); Fluid Consistency: Thin (IDDSI 0); Standing  -     Cancel: Diet: Renal; Low Sodium (2-3g), Low Potassium, Low Phosphorus; Texture: Regular (IDDSI 7); Fluid Consistency: Thin (IDDSI 0)  -     acetaminophen (OFIRMEV) injection 1,000 mg  -     methylPREDNISolone sodium succinate (SOLU-Medrol) injection 40 mg  -     capsaicin (ZOSTRIX) 0.075 % topical cream 1 Application  -     trolamine salicylate (ASPERCREME) 10 % cream 1 Application  -     hydrALAZINE (APRESOLINE) injection 10 mg  -     Hemodialysis Inpatient; Standing  -     Hemodialysis  Inpatient  -     Cancel: CT Abdomen Without Contrast; Standing  -     Cancel: CT Abdomen Without Contrast  -     Vital Signs  -     Intake & Output  -     Weigh Patient  -     Basic Metabolic Panel  -     CBC & Differential  -     Cancel: Magnesium  -     Magnesium  -     Cancel: Phosphorus  -     Phosphorus  -     Insert Peripheral IV  -     Saline Lock & Maintain IV Access  -     Place Sequential Compression Device  -     Maintain Sequential Compression Device  -     HYDROmorphone (DILAUDID) injection 0.5 mg; Refill: 0  -     hydrOXYzine (ATARAX) tablet 50 mg  -     Vital Signs  -     Vital Signs  -     Vital Signs  -     Vital Signs  -     Vital Signs  -     Vital Signs  -     Intake & Output  -     Intake & Output  -     Oral Care  -     Oral Care  -     Dietary Nutrition Supplements Novasource Renal (Nepro)  -     Dietary Nutrition Supplements Novasource Renal (Nepro)  -     Dietary Nutrition Supplements Novasource Renal (Nepro)  -     Cancel: CT Abdomen Pelvis With & Without Contrast; Standing  -     Cancel: CT Abdomen Pelvis With & Without Contrast  -     CT Abdomen Without Contrast; Standing  -     CT Abdomen Without Contrast  -     Cancel: Occult Blood, Fecal By Immunoassay - Stool, Per Rectum; Standing  -     Cancel: Occult Blood, Fecal By Immunoassay - Stool, Per Rectum  -     Apply Warming [place]; Standing  -     Apply Warming [place]  -     Wound Ostomy Eval & Treat; Standing  -     Wound Ostomy Eval & Treat  -     Respiratory Culture - Sputum, Throat; Standing  -     Procalcitonin; Standing  -     Legionella Antigen, Urine - Urine, Urine, Clean Catch; Standing  -     S. Pneumo Ag Urine or CSF - Urine, Urine, Clean Catch; Standing  -     Respiratory Panel PCR w/COVID-19(SARS-CoV-2) [facility]/VARGHESE/SHANNON/PAD/[facility]/XI In-House, NP Swab in UTM/VTM, 2 HR TAT - Swab, Nasopharynx; Standing  -     PT Consult: Eval & Treat Functional Mobility Below Baseline; Standing  -     Respiratory Culture - Sputum, Throat  -      Procalcitonin  -     Legionella Antigen, Urine - Urine, Urine, Clean Catch  -     S. Pneumo Ag Urine or CSF - Urine, Urine, Clean Catch  -     Respiratory Panel PCR w/COVID-19(SARS-CoV-2) [facility]/VARGHESE/SHANNON/[facility]/XI In-House, NP Swab in UTM/VTM, 2 HR TAT - Swab, Nasopharynx  -     PT Consult: Eval & Treat Functional Mobility Below Baseline  -     CT Chest Without Contrast Diagnostic; Standing  -     CT Chest Without Contrast Diagnostic  -     Discontinue: Pharmacy To Dose: Cefepime  -     Discontinue: Pharmacy to dose vancomycin  -     MRSA Screen, PCR (Inpatient) - Swab, Nares; Standing  -     MRSA Screen, PCR (Inpatient) - Swab, Nares  -     SLP Consult: Eval & Treat Swallow Disorder; Standing  -     SLP Consult: Eval & Treat Swallow Disorder  -     Hepatitis B Surface Antigen; Standing  -     Hepatitis B Core Antibody, IgM; Standing  -     Hepatitis B Surface Antibody; Standing  -     Hepatitis B Surface Antigen  -     Hepatitis B Core Antibody, IgM  -     Hepatitis B Surface Antibody  -     Oxygen Therapy- Nasal Cannula; Titrate 1-6 LPM Per SpO2; 90% or Greater; Standing  -     Blood Culture - Blood,; Standing  -     Blood Culture - Blood,; Standing  -     Blood Culture - Blood, Arm, Left  -     Blood Culture - Blood, Arm, Left  -     Cancel: Hemodialysis Inpatient; Standing  -     Cancel: Hemodialysis Inpatient  -     SLP Plan of Care Cert / Re-Cert; Standing  -     SLP Plan of Care Cert / Re-Cert  -     Diet: Renal; Low Sodium (2-3g), Low Potassium, Low Phosphorus; Texture: Soft to Chew (NDD 3); Soft to Chew: Whole Meat; Fluid Consistency: Thin (IDDSI 0); Standing  -     Diet: Renal; Low Sodium (2-3g), Low Potassium, Low Phosphorus; Texture: Soft to Chew (NDD 3); Soft to Chew: Whole Meat; Fluid Consistency: Thin (IDDSI 0)  -     heparin (porcine) injection 3,200 Units  -     Elevate Heels Off of Bed; Standing  -     Turn Patient; Standing  -     Use Seat Cushion When Up In Chair; Standing  -     Head of Bed 30  Degrees or Less; Standing  -     Wound Care; Standing  -     Skin Care; Standing  -     Elevate Heels Off of Bed  -     Turn Patient  -     Use Seat Cushion When Up In Chair  -     Head of Bed 30 Degrees or Less  -     Wound Care  -     Skin Care  -     Discontinue: Pharmacy To Dose: Ampicillin-sulbactam  -     Reason for Not Completing Sepsis Bundle (Not Currently Septic - Will Reevaluate as Needed); Standing  -     Reason for Not Completing Sepsis Bundle (Not Currently Septic - Will Reevaluate as Needed)  -     heparin (porcine) 5000 UNIT/ML injection 5,000 Units  -     ampicillin-sulbactam (UNASYN) 3 g in sodium chloride 0.9 % 100 mL IVPB-VTB  -     Basic Metabolic Panel; Standing  -     CBC (No Diff); Standing  -     Magnesium; Standing  -     Phosphorus; Standing  -     OT Consult: Eval & Treat Discharge Placement Assessment; Standing  -     OT Consult: Eval & Treat Discharge Placement Assessment  -     Inpatient Pulmonology Consult; Standing  -     Inpatient Pulmonology Consult  -     Discontinue: atorvastatin (LIPITOR) tablet 40 mg  -     rosuvastatin (CRESTOR) tablet 10 mg  -     Basic Metabolic Panel  -     CBC (No Diff)  -     Magnesium  -     Phosphorus  -     labetalol (NORMODYNE,TRANDATE) injection 10 mg  -     Vital Signs  -     Vital Signs  -     Vital Signs  -     Vital Signs  -     Vital Signs  -     Vital Signs  -     Intake & Output  -     Intake & Output  -     Oral Care  -     Oral Care  -     Dietary Nutrition Supplements Novasource Renal (Nepro)  -     Dietary Nutrition Supplements Novasource Renal (Nepro)  -     Dietary Nutrition Supplements Novasource Renal (Nepro)  -     losartan (COZAAR) tablet 50 mg  -     Cancel: Renal Function Panel; Standing  -     Hemodialysis Inpatient; Standing  -     Hemodialysis Inpatient  -     Hemodialysis Inpatient; Standing  -     Hemodialysis Inpatient  -     OT Plan of Care Cert / Re-Cert; Standing  -     OT Plan of Care Cert / Re-Cert  -     Lidocaine 4 % 1  patch  -     Incentive Spirometry; Standing  -     Oscillating Positive Expiratory Pressure (OPEP); Standing  -     Incentive Spirometry  -     Incentive Spirometry  -     Incentive Spirometry  -     Incentive Spirometry  -     Incentive Spirometry  -     Oscillating Positive Expiratory Pressure (OPEP)  -     Basic Metabolic Panel; Standing  -     CBC (No Diff); Standing  -     Magnesium; Standing  -     Phosphorus; Standing  -     Inpatient Admission; Standing  -     Inpatient Admission  -     PT Plan of Care Cert / Re-Cert; Standing  -     PT Plan of Care Cert / Re-Cert  -     Incentive Spirometry  -     Incentive Spirometry  -     Incentive Spirometry  -     Basic Metabolic Panel  -     CBC (No Diff)  -     Magnesium  -     Phosphorus  -     Vital Signs  -     Vital Signs  -     Vital Signs  -     Vital Signs  -     Vital Signs  -     Vital Signs  -     Intake & Output  -     Intake & Output  -     Oral Care  -     Oral Care  -     Dietary Nutrition Supplements Novasource Renal (Nepro)  -     Dietary Nutrition Supplements Novasource Renal (Nepro)  -     Dietary Nutrition Supplements Novasource Renal (Nepro)  -     Incentive Spirometry      Assessment & Plan  CT scan reviewed  There is no evidence of any active pneumonia  Continue nebulized tobramycin for pseudomonal colonization  Okay from a standpoint to discontinue systemic antibiotics as I do not feel that she has any acute infectious process  Patient's right lower lobe airspace disease likely represents pneumonitis and post radiation changes  Continue with Solu-Medrol  Continue with airway clearance  Start patient Jessica Pulmicort  Suspect having extra dialysis with fluid removal will help her shortness of breath      1. Worsening shortness of breath.  Imaging shows scarring versus pneumonitis. 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 Currently on telemetry and dialyzing 4 L every day, with oxygen saturation at 99 to 100% on room air. BNP was  not grossly elevated, but imaging indicates more edema. On antibiotics. Dialysis will help manage symptoms. Follow-up check will be conducted tomorrow.    I have reviewed labs, imaging, pertinent clinical data and provider notes.   I have discussed with bedside nurse and primary service.       Electronically signed by Khalif Yanez MD, 7/26/2025, 08:05 EDT.

## 2025-07-26 NOTE — PROGRESS NOTES
[facility]   Hospitalist Progress Note  Date: 2025  Patient Name: Charlette Rai  : 1935  MRN: [id number]  Date of admission: 2025  Consultants:   -Nephrology: Dr. Elliott Aviles, Dr. Edi García  -Pulmonology: Dr. Regis Holcomb    Subjective   Subjective     Chief Complaint: Shortness of breath    Summary:   Charlette Rai is a 89 y.o. female with PAD, renal artery stenosis s/p stenting, hypertension, ESRD on hemodialysis, chronic diastolic heart failure, type 2 diabetes mellitus and hypothyroidism presented with complaints of shortness of breath.  CXR obtained and no acute abnormalities were noted.  Patient placed on supplemental O2 to maintain sats.  CT abdomen pelvis did show right lower lobe pneumonia.  Antibiotic started.  Nephrology and pulmonology consulted.  Patient underwent several consecutive days of dialysis.  Shortness of breath did not improve.  Pulmonology noted that low concern for active pneumonia and antibiotics were discontinued.  Noted that right lower lobe airspace disease likely represented pneumonitis and postradiation changes.    Interval Followup:   Patient seen while in dialysis.  She states that she is feeling better today.  Shortness of breath is improving some.  She denied any nausea or vomiting.  Appear to be tolerating dialysis without issue.    Objective   Objective     Vitals:   Temp:  [97.3 °F (36.3 °C)-98.8 °F (37.1 °C)] 98.3 °F (36.8 °C)  Heart Rate:  [70-77] 71  Resp:  [12-20] 16  BP: (129-188)/(48-79) 135/62  Flow (L/min) (Oxygen Therapy):  [4] 4  Physical Exam   Gen: Frail chronically ill-appearing elderly female, sitting up in bed on dialysis, pleasant  Resp: Decent aeration, equal chest rise bilaterally  Card: RRR, No m/r/g  Abd: Soft, Nontender, Nondistended, + bowel sounds    Result Review    Result Review:  I have personally reviewed the results as below and agree with these findings:  []  Laboratory:   CMP          2025    05:10 2025     05:10 7/26/2025    05:04   CMP   Glucose 143  178  161    BUN 29.0  28.7  20.5    Creatinine 2.48  2.16  1.60    EGFR 18.3  21.5  30.9    Sodium 140  138  132    Potassium 4.9  4.1  4.0    Chloride 109  104  100    Calcium 8.1  7.8  7.8    BUN/Creatinine Ratio 11.7  13.3  12.8    Anion Gap 11.7  10.1  8.1      CBC          7/24/2025    05:10 7/25/2025    05:10 7/26/2025    05:04   CBC   WBC 6.26  5.49  4.51    RBC 3.89  3.59  3.79    Hemoglobin 12.3  11.5  12.3    Hematocrit 41.1  37.6  39.4    .7  104.7  104.0    MCH 31.6  32.0  32.5    MCHC 29.9  30.6  31.2    RDW 21.8  21.1  20.5    Platelets 139  133  118    Magnesium and phosphorus within normal limits  [x]  Microbiology: Blood culture (07/24/2025): No growth to date.  []  Radiology:   [x]  EKG/Telemetry:    []  Cardiology/Vascular:    []  Pathology:  []  Old records:  []  Other:    Assessment & Plan   Assessment / Plan     Assessment:  Right lower lobe airspace disease secondary to pneumonitis and postradiation changes  Shortness of breath secondary to above and likely volume overload  ESRD on hemodialysis  Generalized weakness  Chronic hypoxic respiratory failure  Anemia of chronic disease  Hypertension  Chronic diastolic heart failure, not acutely exacerbated  Severe protein calorie malnutrition  History of renal artery stenosis  Hypothyroidism    Plan:  -Nephrology and pulmonology consulted and following, appreciate assistance and recommendations in the care of this patient.  -Discontinue antibiotic  -Continue supplemental O2 to maintain sats greater than 90%, wean as tolerated  -Dialysis per nephrology.  -Continue Brovana, Pulmicort  -Continue JUAN nebs  -Continue IV Solu-Medrol  -Encourage use of incentive spirometer and flutter valve  -Monitor electrolytes and renal function with BMP and magnesium level in the AM  -Monitor WBC and Hgb with CBC in the AM  -Clinical course will dictate further management     DVT Prophylaxis: SCDs, heparin  GI  Prophylaxis: Pantoprazole  Diet:   Diet Order   Procedures    Diet: Renal; Low Sodium (2-3g), Low Potassium, Low Phosphorus; Texture: Soft to Chew (NDD 3); Soft to Chew: Whole Meat; Fluid Consistency: Thin (IDDSI 0)     Dispo: Return to [facility] when medically appropriate for discharge.  Pre-CERT has been started.     Time spent personally reviewing patient's chart, labs and imaging, evaluating/examining the patient, discussing care plan with patient and nurse at bedside and discussing management with consultants (Nephrology and Pulmonology): 51 minutes.     Part of this note may be an electronic transcription/translation of spoken language to printed text using the Dragon dictation system.    VTE Prophylaxis:  Pharmacologic & mechanical VTE prophylaxis orders are present.        CODE STATUS:   Code Status (Patient has no pulse and is not breathing): No CPR (Do Not Attempt to Resuscitate)  Medical Interventions (Patient has pulse or is breathing): Limited Support  Medical Intervention Limits: No intubation (DNI)        Electronically signed by Pieter Salgado MD, 7/26/2025, 11:58 EDT.

## 2025-07-27 LAB
027 TOXIN: NORMAL
C DIFF TOX GENS STL QL NAA+PROBE: NEGATIVE

## 2025-07-27 PROCEDURE — 99233 SBSQ HOSP IP/OBS HIGH 50: CPT | Performed by: INTERNAL MEDICINE

## 2025-07-27 PROCEDURE — 94799 UNLISTED PULMONARY SVC/PX: CPT

## 2025-07-27 PROCEDURE — 99232 SBSQ HOSP IP/OBS MODERATE 35: CPT | Performed by: INTERNAL MEDICINE

## 2025-07-27 PROCEDURE — 25010000002 METHYLPREDNISOLONE PER 40 MG: Performed by: HOSPITALIST

## 2025-07-27 PROCEDURE — 87493 C DIFF AMPLIFIED PROBE: CPT | Performed by: INTERNAL MEDICINE

## 2025-07-27 PROCEDURE — 25010000002 ONDANSETRON PER 1 MG: Performed by: HOSPITALIST

## 2025-07-27 PROCEDURE — 94760 N-INVAS EAR/PLS OXIMETRY 1: CPT

## 2025-07-27 PROCEDURE — 25010000002 HEPARIN (PORCINE) PER 1000 UNITS: Performed by: INTERNAL MEDICINE

## 2025-07-27 PROCEDURE — 25010000002 METHYLPREDNISOLONE PER 40 MG

## 2025-07-27 PROCEDURE — 94664 DEMO&/EVAL PT USE INHALER: CPT

## 2025-07-27 RX ADMIN — METHYLPREDNISOLONE SODIUM SUCCINATE 40 MG: 40 INJECTION, POWDER, FOR SOLUTION INTRAMUSCULAR; INTRAVENOUS at 21:07

## 2025-07-27 RX ADMIN — ACETAMINOPHEN 650 MG: 325 TABLET ORAL at 07:57

## 2025-07-27 RX ADMIN — CARVEDILOL 6.25 MG: 6.25 TABLET, FILM COATED ORAL at 07:58

## 2025-07-27 RX ADMIN — CARVEDILOL 6.25 MG: 6.25 TABLET, FILM COATED ORAL at 17:56

## 2025-07-27 RX ADMIN — ISOSORBIDE MONONITRATE 30 MG: 30 TABLET, EXTENDED RELEASE ORAL at 21:06

## 2025-07-27 RX ADMIN — ASPIRIN 81 MG: 81 TABLET, COATED ORAL at 08:00

## 2025-07-27 RX ADMIN — TOBRAMYCIN 300 MG: 300 SOLUTION RESPIRATORY (INHALATION) at 09:36

## 2025-07-27 RX ADMIN — HEPARIN SODIUM 5000 UNITS: 5000 INJECTION INTRAVENOUS; SUBCUTANEOUS at 05:49

## 2025-07-27 RX ADMIN — ARFORMOTEROL TARTRATE 15 MCG: 15 SOLUTION RESPIRATORY (INHALATION) at 09:36

## 2025-07-27 RX ADMIN — FERROUS SULFATE TAB 325 MG (65 MG ELEMENTAL FE) 325 MG: 325 (65 FE) TAB at 07:58

## 2025-07-27 RX ADMIN — ROSUVASTATIN CALCIUM 10 MG: 5 TABLET, FILM COATED ORAL at 21:05

## 2025-07-27 RX ADMIN — Medication 5 MG: at 21:05

## 2025-07-27 RX ADMIN — BUDESONIDE 0.5 MG: 0.5 SUSPENSION RESPIRATORY (INHALATION) at 21:11

## 2025-07-27 RX ADMIN — ONDANSETRON 4 MG: 2 INJECTION INTRAMUSCULAR; INTRAVENOUS at 09:21

## 2025-07-27 RX ADMIN — TOBRAMYCIN 300 MG: 300 SOLUTION RESPIRATORY (INHALATION) at 21:11

## 2025-07-27 RX ADMIN — CLOPIDOGREL BISULFATE 75 MG: 75 TABLET, FILM COATED ORAL at 08:00

## 2025-07-27 RX ADMIN — HEPARIN SODIUM 5000 UNITS: 5000 INJECTION INTRAVENOUS; SUBCUTANEOUS at 15:08

## 2025-07-27 RX ADMIN — FLUTICASONE PROPIONATE 1 SPRAY: 50 SPRAY, METERED NASAL at 13:36

## 2025-07-27 RX ADMIN — BUDESONIDE 0.5 MG: 0.5 SUSPENSION RESPIRATORY (INHALATION) at 09:36

## 2025-07-27 RX ADMIN — Medication 10 ML: at 21:07

## 2025-07-27 RX ADMIN — TROLAMINE SALICYLATE 1 APPLICATION: 100 CREAM TOPICAL at 17:56

## 2025-07-27 RX ADMIN — LIDOCAINE 1 PATCH: 4 PATCH TOPICAL at 12:00

## 2025-07-27 RX ADMIN — ARFORMOTEROL TARTRATE 15 MCG: 15 SOLUTION RESPIRATORY (INHALATION) at 21:12

## 2025-07-27 RX ADMIN — LOSARTAN POTASSIUM 50 MG: 50 TABLET, FILM COATED ORAL at 08:00

## 2025-07-27 RX ADMIN — TROLAMINE SALICYLATE 1 APPLICATION: 100 CREAM TOPICAL at 05:50

## 2025-07-27 RX ADMIN — PANTOPRAZOLE SODIUM 40 MG: 40 TABLET, DELAYED RELEASE ORAL at 05:49

## 2025-07-27 RX ADMIN — METHYLPREDNISOLONE SODIUM SUCCINATE 40 MG: 40 INJECTION, POWDER, FOR SOLUTION INTRAMUSCULAR; INTRAVENOUS at 12:24

## 2025-07-27 RX ADMIN — ACETAMINOPHEN 650 MG: 325 TABLET ORAL at 13:35

## 2025-07-27 RX ADMIN — HEPARIN SODIUM 5000 UNITS: 5000 INJECTION INTRAVENOUS; SUBCUTANEOUS at 21:07

## 2025-07-27 RX ADMIN — Medication 10 ML: at 09:21

## 2025-07-27 RX ADMIN — LEVOTHYROXINE SODIUM 50 MCG: 0.05 TABLET ORAL at 07:58

## 2025-07-27 NOTE — PROGRESS NOTES
[facility]   Hospitalist Progress Note  Date: 2025  Patient Name: Charlette Rai  : 1935  MRN: [id number]  Date of admission: 2025  Consultants:   -Nephrology: Dr. Elliott Aviles, Dr. Edi García, Dr. Karlo Guevara  -Pulmonology: Dr. Regis Holcomb, Dr. Khalif Yanez    Subjective   Subjective     Chief Complaint: Shortness of breath    Summary:   Charlette Rai is a 89 y.o. female with PAD, renal artery stenosis s/p stenting, hypertension, ESRD on hemodialysis, chronic diastolic heart failure, type 2 diabetes mellitus and hypothyroidism presented with complaints of shortness of breath.  CXR obtained and no acute abnormalities were noted.  Patient placed on supplemental O2 to maintain sats.  CT abdomen pelvis did show right lower lobe pneumonia.  Antibiotic started.  Nephrology and pulmonology consulted.  Patient underwent several consecutive days of dialysis.  Shortness of breath did not improve.  Pulmonology noted that low concern for active pneumonia and antibiotics were discontinued.  Noted that right lower lobe airspace disease likely represented pneumonitis and postradiation changes.    Interval Followup:   No acute events overnight.  Patient states that she continues to feel little bit better.  Her shortness of breath is stable.  She did have some complaints of nausea this morning but overall says she was feeling better.  Still weak and frail.  Nursing with no additional acute issues to report.    Objective   Objective     Vitals:   Temp:  [97.3 °F (36.3 °C)-98.2 °F (36.8 °C)] 97.3 °F (36.3 °C)  Heart Rate:  [65-79] 74  Resp:  [16-20] 18  BP: (135-176)/(46-76) 152/74  Flow (L/min) (Oxygen Therapy):  [3-4] 3  Physical Exam   Gen: Chronically ill-appearing elderly female, frail, sitting up in bed, pleasant  Resp: Normal respiratory effort  Card: RRR, No m/r/g  Abd: Soft, Nontender, Nondistended, + bowel sounds    Result Review    Result Review:  I have personally reviewed the results as  below and agree with these findings:  []  Laboratory:   CMP          7/24/2025    05:10 7/25/2025    05:10 7/26/2025    05:04   CMP   Glucose 143  178  161    BUN 29.0  28.7  20.5    Creatinine 2.48  2.16  1.60    EGFR 18.3  21.5  30.9    Sodium 140  138  132    Potassium 4.9  4.1  4.0    Chloride 109  104  100    Calcium 8.1  7.8  7.8    BUN/Creatinine Ratio 11.7  13.3  12.8    Anion Gap 11.7  10.1  8.1      CBC          7/24/2025    05:10 7/25/2025    05:10 7/26/2025    05:04   CBC   WBC 6.26  5.49  4.51    RBC 3.89  3.59  3.79    Hemoglobin 12.3  11.5  12.3    Hematocrit 41.1  37.6  39.4    .7  104.7  104.0    MCH 31.6  32.0  32.5    MCHC 29.9  30.6  31.2    RDW 21.8  21.1  20.5    Platelets 139  133  118    Phosphorus and magnesium within normal limits  [x]  Microbiology: Blood culture (07/24/2025): No growth to date.  []  Radiology:   [x]  EKG/Telemetry:    []  Cardiology/Vascular:    []  Pathology:  []  Old records:  []  Other:    Assessment & Plan   Assessment / Plan     Assessment:  Right lower lobe airspace disease secondary to pneumonitis and postradiation changes  Shortness of breath secondary to above and likely volume overload  ESRD on hemodialysis  Generalized weakness  Chronic hypoxic respiratory failure  Anemia of chronic disease  Hypertension  Chronic diastolic heart failure, not acutely exacerbated  Severe protein calorie malnutrition  History of renal artery stenosis  Hypothyroidism    Plan:  -Nephrology and pulmonology consulted and following, appreciate assistance and recommendations in the care of this patient.  -Continue supplemental O2 to maintain sats greater than 90%, wean as tolerated  -Dialysis per nephrology.  -Continue Brovana, Pulmicort  -Continue JUAN nebs  -Continue IV Solu-Medrol  -Encourage use of incentive spirometer and flutter valve  -Will monitor electrolytes and renal function with BMP and magnesium level in the AM  -Will monitor WBC and Hgb with CBC in the  AM  -Clinical course will dictate further management     DVT Prophylaxis: SCDs, heparin  GI Prophylaxis: Pantoprazole  Diet:   Diet Order   Procedures    Diet: Renal; Low Sodium (2-3g), Low Potassium, Low Phosphorus; Texture: Soft to Chew (NDD 3); Soft to Chew: Whole Meat; Fluid Consistency: Thin (IDDSI 0)     Dispo: Return to [facility] when medically appropriate for discharge.  Pre-CERT has been started.        Personally reviewed patients labs and imaging, discussed with patient and nurse at bedside. Discussed management with the following consultants: Nephrology and pulmonology.     Part of this note may be an electronic transcription/translation of spoken language to printed text using the Dragon dictation system.    VTE Prophylaxis:  Pharmacologic & mechanical VTE prophylaxis orders are present.        CODE STATUS:   Code Status (Patient has no pulse and is not breathing): No CPR (Do Not Attempt to Resuscitate)  Medical Interventions (Patient has pulse or is breathing): Limited Support  Medical Intervention Limits: No intubation (DNI)        Electronically signed by Pieter Salgado MD, 7/27/2025, 10:26 EDT.

## 2025-07-27 NOTE — PLAN OF CARE
Goal Outcome Evaluation:              Outcome Evaluation: Patient alert and oriented x4. Pain treated per MAR. Continue care per Plan of Care.

## 2025-07-27 NOTE — PROGRESS NOTES
Pulmonary / Critical Care Progress Note      Patient Name: Charlette Rai  : 1935  MRN: [id number]  Primary Care Physician:  Brennan Mosqueda MD  Date of admission: 2025    Subjective   Subjective   Follow-up for hypoxic respiratory failure, edema, ESRD on HD    Over past 24 hours: Underwent dialysis.  Remains on Brovana, Pulmicort, and DuoNebs.  Currently on JUAN nebs.  Remains on Solu-Medrol 40 mg IV twice daily    No acute events overnight.     This morning,   Currently on 3 L nasal cannula  Remains bedridden  Remains weak and fatigued  Denies any chest pain chest tightness  Denies shortness of breath  No fever or chills  No nausea or vomiting  Denies cough    Objective   Objective     Vitals:   Temp:  [97.3 °F (36.3 °C)-98.3 °F (36.8 °C)] 97.3 °F (36.3 °C)  Heart Rate:  [71-79] 74  Resp:  [16-20] 16  BP: (129-188)/(46-76) 176/53  Flow (L/min) (Oxygen Therapy):  [3.5-4] 3.5  Physical Exam   Vital Signs Reviewed   General:  WDWN, Alert, NAD.  Chronically ill-appearing female, lying in bed  HEENT:  PERRL, EOMI.  OP, nares clear, no sinus tenderness  Neck:  Supple, no JVD, no thyromegaly  Chest:  good aeration, diminished on auscultation bilaterally, currently on 3 L  CV: RRR, no MGR, pulses 2+, equal.  Abd:  Soft, NT, ND, + BS, no HSM  EXT:  no clubbing, no cyanosis, no edema  Neuro:  A&Ox3, CN grossly intact, no focal deficits.  Skin: No rashes or lesions noted      Result Review    Result Review:  I have personally reviewed the results from the time of this admission to 2025 07:55 EDT and agree with these findings:  [x]  Laboratory  [x]  Microbiology  [x]  Radiology  [x]  EKG/Telemetry   []  Cardiology/Vascular   []  Pathology  []  Old records  []  Other:  Most notable findings include:         Lab 25  0504 25  0510 25  0510 25  1930 25  1001   WBC 4.51 5.49 6.26 5.90  --    HEMOGLOBIN 12.3 11.5* 12.3 11.9* 7.9*   HEMATOCRIT 39.4 37.6 41.1 39.3 27.4*    PLATELETS 118* 133* 139* 160  --    SODIUM 132* 138 140 143  --    POTASSIUM 4.0 4.1 4.9 4.5  --    CHLORIDE 100 104 109* 107  --    CO2 23.9 23.9 19.3* 25.3  --    BUN 20.5 28.7* 29.0* 25.1*  --    CREATININE 1.60* 2.16* 2.48* 2.61*  --    GLUCOSE 161* 178* 143* 129*  --    CALCIUM 7.8* 7.8* 8.1* 8.1*  --    PHOSPHORUS 2.7 4.1 4.8*  --   --    TOTAL PROTEIN  --   --   --  6.1  --    ALBUMIN  --   --   --  2.9*  --    GLOBULIN  --   --   --  3.2  --             CT Chest Without Contrast Diagnostic  Result Date: 7/24/2025  CT CHEST WO CONTRAST DIAGNOSTIC Date of Exam: 7/24/2025 2:14 PM EDT Indication: pneumonia. History of lung cancer Comparison: Chest x-ray July 23, 2025 Technique: Axial CT images were obtained of the chest without contrast administration.  Reconstructed coronal and sagittal images were also obtained. Automated exposure control and iterative construction methods were used. Findings: There is atherosclerotic vascular calcification. There is coronary artery calcification. There is volume loss to the left lung secondary to prior left upper lobe lobectomy. There is some pleural-based calcification left hemithorax. There is suggested scarring in the left lower lobe. There are changes in the right perihilar region extending to the pleura in the right upper lobe posteriorly that could reflect treatment related change. This has been noted. In the interval since prior exam there is airspace disease which has developed in the right lower lobe. This may be treatment related. Inflammatory infectious process could not be excluded. There is some scarring at the right base. There is some new pleural-based density in the right basilar area that may reflect sequela to treatment. This is best defined on image [id number]. Lower slices through the upper abdomen reveal no acute changes. There is a central venous catheter with its tip at the caval atrial junction. Visualized osseous structures do not appear unusual for  the given patient.     Impression: Impression: 1.There has been interval change to the right lower chest that may reflect posttreatment pneumonitis. An underlying inflammatory infectious process not excluded. 2.Pleural-based density in the right basilar area which seems changed and may be reflective of posttreatment change to this area 3.Posttreatment changes right perihilar area in right upper lobe. 4.Changes from left upper lobe lobectomy. There is scarring in the left lower lobe 5.Atherosclerotic vascular calcification is present. Electronically Signed: Tony Kurtz MD  7/24/2025 3:20 PM EDT  Workstation ID: [id number]      CT Head Without Contrast  Result Date: 7/17/2025  CT HEAD WO CONTRAST Date of Exam: 7/17/2025 10:17 AM EDT Indication: Lethargic. Comparison: MRI of the brain and CT head from July 14, 2025 Technique: Axial CT images were obtained of the head without contrast administration.  Reconstructed coronal and sagittal images were also obtained. Automated exposure control and iterative construction methods were used. Findings: No acute intracranial hemorrhage or extra-axial collection is identified. The ventricles appear normal in caliber, with no evidence of mass effect or midline shift. The basal cisterns appear patent. The gray-white differentiation appears preserved. The calvarium appear intact. The paranasal sinuses are clear. There are bilateral mastoid effusions worse on the right. Patchy areas of periventricular and subcortical white matter hypodensities are nonspecific, but likely the sequela of moderate chronic  small vessel ischemic disease.     Impression: Impression: 1.No acute intracranial process identified. 2.Findings suggestive of moderate chronic small vessel ischemic disease. 3.Bilateral mastoid effusions worse on the right. Electronically Signed: Leonardo Herbert MD  7/17/2025 10:48 AM EDT  Workstation ID: [id number]    CT Head Without Contrast Stroke Protocol  Result Date:  7/14/2025  CT HEAD WO CONTRAST STROKE PROTOCOL Date of Exam: 7/14/2025 10:42 AM EDT Indication: AMS. Comparison: 7/9/2025 Technique: Axial CT images were obtained of the head without contrast administration.  Reconstructed coronal and sagittal images were also obtained. Automated exposure control and iterative construction methods were used. Scan Time: 10:40 a.m. Results discussed with the patient's floor nurse at 10:56 a.m. Findings: No intracranial hemorrhage. No edema or mass effect. No loss of gray-white differentiation. Chronic small vessel ischemic changes in the supratentorial white matter and basal ganglia. CSF spaces stable. No abnormal extra-axial fluid collection     Impression: Impression: 1. No intracranial hemorrhage. 2. No CT changes of acute major vascular territory brain ischemia at this time Electronically Signed: Karlo Andrea  7/14/2025 10:56 AM EDT  Workstation ID: [id number]    CT Head Without Contrast  Result Date: 7/9/2025  CT HEAD WO CONTRAST Date of Exam: 7/9/2025 5:39 PM EDT Indication: Altered mental status. Comparison: CT head June 6, 2025 Technique: Axial CT images were obtained of the head without contrast administration.  Reconstructed coronal and sagittal images were also obtained. Automated exposure control and iterative construction methods were used. Findings: There are periventricular and deep white matter changes which could reflect more chronic small vessel ischemic change. There is vascular calcification in the area of the carotid siphon and distal left vertebral artery. There is bilateral mastoid disease.  There is right middle ear disease surrounding the ossicles. An incidental osteoma is noted in the right frontal sinus.     Impression: Impression: 1.There are periventricular and deep white matter changes which could reflect more chronic small vessel ischemic change. 2.Atherosclerotic vascular calcification. 3.Bilateral mastoid disease. There is right middle ear disease  surrounding the ossicles. 4.Incidental osteoma right frontal sinus. Electronically Signed: Tony Kurtz MD  7/9/2025 5:54 PM EDT  Workstation ID: [id number]       Results for orders placed during the hospital encounter of 04/22/25    Adult Transthoracic Echo Complete W/ Cont if Necessary Per Protocol    Interpretation Summary    Left ventricular systolic function is normal. Calculated left ventricular EF = 60.5%    Left ventricular diastolic function is consistent with (grade Ia w/high LAP) impaired relaxation.    The left atrial cavity is mildly dilated.    Mild to moderate aortic valve stenosis is present.    Aortic valve mean pressure gradient is 16 mmHg.    Mild to moderate mitral valve stenosis is present.    Estimated right ventricular systolic pressure from tricuspid regurgitation is mildly elevated (35-45 mmHg).       Assessment & Plan   Assessment / Plan     Active Hospital Problems:  Active Hospital Problems    Diagnosis     **Acute respiratory failure          Impression:   Right lower lobe airspace disease secondary to pneumonitis and postradiation changes  Shortness of breath secondary to above and likely volume overload  ESRD on hemodialysis  Generalized weakness  Chronic hypoxic respiratory failure  Anemia of chronic disease  Hypertension  Chronic diastolic heart failure, not acutely exacerbated  Severe protein calorie malnutrition  History of renal artery stenosis  Hypothyroidism       Plan:   - Continue to wean supplemental oxygen to maintain SpO2 greater than 90%  - Continue Brovana, Pulmicort, and DuoNebs  - Continue JUAN nebs.  Continue to cycle 30 days on 30 days off  - Encourage activity as tolerated and incentive spirometer use  - Discontinue Solu-Medrol  - HD per nephrology  - Continue to monitor renal panel electrolytes.  Replace electrolytes as necessary      VTE Prophylaxis:  Pharmacologic & mechanical VTE prophylaxis orders are present.    CODE STATUS:   Code Status (Patient has no pulse  and is not breathing): No CPR (Do Not Attempt to Resuscitate)  Medical Interventions (Patient has pulse or is breathing): Limited Support  Medical Intervention Limits: No intubation (DNI)      I personally reviewed pertinent labs, imaging and provider notes. Discussed with bedside nurse and will discuss with primary service.     Electronically signed by DAT Marcelino, 07/27/25, 1:10 PM EDT.    This visit was performed by BOTH a physician and an APC. I personally evaluated and examined the patient. I performed all aspects of MDM as documented. , I have reviewed and confirmed the accuracy of the patient's history as documented in this note., and I have reexamined the patient and the results are consistent with the previously documented exam. I have updated the documentation as necessary.     Electronically signed by Khalif Yanez MD, 07/27/25, 2:52 PM EDT.       Electronically signed by Khalif Yanez MD, 7/27/2025, 07:55 EDT.

## 2025-07-27 NOTE — PLAN OF CARE
Goal Outcome Evaluation:              Outcome Evaluation: Patient alert and oriented. On 3L NC. Up in the chair for lunch and dinner. Skin care completed. Patient had multiple bowel movements during shift, tested for c.diff and was negative. Tylenol given PRN per MAR. Zofran given once.

## 2025-07-27 NOTE — PROGRESS NOTES
[facility]     Nephrology Progress Note      Patient Name: Charlette Rai  : 1935  MRN: [id number]  Primary Care Physician:  Brennan Mosqueda MD  Date of admission: 2025    Subjective   Subjective     Interval History:    Stable overnight  Breathing OK on 3.5 L of O2  No swelling  No chills      Review of Systems   All systems were reviewed and negative except for: What is mentioned above.    Objective   Objective     Vitals:   Temp:  [97.3 °F (36.3 °C)-98.2 °F (36.8 °C)] 97.3 °F (36.3 °C)  Heart Rate:  [65-79] 74  Resp:  [16-18] 18  BP: (152-176)/(48-76) 152/74  Flow (L/min) (Oxygen Therapy):  [3-4] 3  Physical Exam:   Constitutional: Awake, alert   Eyes: sclerae anicteric, no conjunctival injection   HENT: mucous membranes moist   Neck: Supple, no thyromegaly, no lymphadenopathy, trachea midline, No JVD   Respiratory: Decreased breath sounds bilaterally, , no wheezing.      Cardiovascular: RRR, no murmurs, rubs, or gallops.   Gastrointestinal: Positive bowel sounds, soft, nontender, nondistended   Musculoskeletal: No edema, no clubbing or cyanosis   Psychiatric: Appropriate affect, cooperative   Neurologic: Oriented x 3, moving all extremities, Cranial Nerves grossly intact, speech clear   Skin: warm and dry, no rashes    Right IJ TDC in place.    Result Review    Result Reviewed:  I have personally reviewed the results from the time of this admission to 2025 13:02 EDT and agree with these findings:  [x]  Laboratory  []  Microbiology  [x]  Radiology  []  EKG/Telemetry   []  Cardiology/Vascular   []  Pathology  [x]  Old records  []  Other:        Lab 25  0504 25  0510 25  0510 25  1930   SODIUM 132* 138 140 143   POTASSIUM 4.0 4.1 4.9 4.5   CHLORIDE 100 104 109* 107   CO2 23.9 23.9 19.3* 25.3   BUN 20.5 28.7* 29.0* 25.1*   CREATININE 1.60* 2.16* 2.48* 2.61*   GLUCOSE 161* 178* 143* 129*   EGFR 30.9* 21.5* 18.3* 17.2*   ANION GAP 8.1 10.1 11.7 10.7   MAGNESIUM  1.9 2.0 1.9 2.4   PHOSPHORUS 2.7 4.1 4.8*  --            Most notable findings include: As above.  CT of the chest noted.    Assessment & Plan   Assessment / Plan       Active Hospital Problems:  Active Hospital Problems    Diagnosis     **Acute respiratory failure    ESRD  Renal artery stenosis  Severe hypertension  Anemia of CKD    Assessment and Plan:      - ESRD, dialysis through right IJ TDC 3 times a week.  Here with acute respiratory failure, pneumonia and volume overload.  Had dialysis last 3 days.  Next HD  MWF schedule next week.    - Acute respiratory failure secondary to volume overload and possible right lower lobe pneumonia.  Pulmonary consulted.    - Anemia of CKD.  Hemoglobin above goal.  Was on CASSANDRA . Resume when hemoglobin closer to 10.  Continue oral iron.  Monitor.     - Secondary hyperparathyroidism and hyperphosphatemia, being addressed with dialysis.  Low phosphorus diet.       - Severe peripheral arterial disease including lower extremities and renal artery stenosis, status post PTA and stent 6/14/2024.       - Lung cancer.  Status post radiation.     - Type 2 diabetes with complications, per primary.    Dialysis tomorrow     Electronically signed by Karlo Guevara MD, 7/27/2025, 13:02 EDT.    [phone]

## 2025-07-28 ENCOUNTER — APPOINTMENT (OUTPATIENT)
Dept: NEPHROLOGY | Facility: HOSPITAL | Age: 89
DRG: 205 | End: 2025-07-28
Payer: MEDICARE

## 2025-07-28 VITALS
BODY MASS INDEX: 21.34 KG/M2 | WEIGHT: 128.09 LBS | TEMPERATURE: 97.9 F | HEIGHT: 65 IN | RESPIRATION RATE: 16 BRPM | HEART RATE: 68 BPM | OXYGEN SATURATION: 100 % | SYSTOLIC BLOOD PRESSURE: 169 MMHG | DIASTOLIC BLOOD PRESSURE: 55 MMHG

## 2025-07-28 LAB
ANION GAP SERPL CALCULATED.3IONS-SCNC: 9.5 MMOL/L (ref 5–15)
BUN SERPL-MCNC: 60.5 MG/DL (ref 8–23)
BUN/CREAT SERPL: 25.1 (ref 7–25)
CALCIUM SPEC-SCNC: 7.9 MG/DL (ref 8.6–10.5)
CHLORIDE SERPL-SCNC: 99 MMOL/L (ref 98–107)
CO2 SERPL-SCNC: 22.5 MMOL/L (ref 22–29)
CREAT SERPL-MCNC: 2.41 MG/DL (ref 0.57–1)
DEPRECATED RDW RBC AUTO: 72.3 FL (ref 37–54)
EGFRCR SERPLBLD CKD-EPI 2021: 18.9 ML/MIN/1.73
ERYTHROCYTE [DISTWIDTH] IN BLOOD BY AUTOMATED COUNT: 19.2 % (ref 12.3–15.4)
GLUCOSE SERPL-MCNC: 203 MG/DL (ref 65–99)
HCT VFR BLD AUTO: 36.7 % (ref 34–46.6)
HGB BLD-MCNC: 11.4 G/DL (ref 12–15.9)
MAGNESIUM SERPL-MCNC: 1.9 MG/DL (ref 1.6–2.4)
MCH RBC QN AUTO: 31.8 PG (ref 26.6–33)
MCHC RBC AUTO-ENTMCNC: 31.1 G/DL (ref 31.5–35.7)
MCV RBC AUTO: 102.2 FL (ref 79–97)
PHOSPHATE SERPL-MCNC: 2.4 MG/DL (ref 2.5–4.5)
PLATELET # BLD AUTO: 109 10*3/MM3 (ref 140–450)
PMV BLD AUTO: 9.4 FL (ref 6–12)
POTASSIUM SERPL-SCNC: 4.1 MMOL/L (ref 3.5–5.2)
RBC # BLD AUTO: 3.59 10*6/MM3 (ref 3.77–5.28)
SODIUM SERPL-SCNC: 131 MMOL/L (ref 136–145)
WBC NRBC COR # BLD AUTO: 5.84 10*3/MM3 (ref 3.4–10.8)

## 2025-07-28 PROCEDURE — 25010000002 ONDANSETRON PER 1 MG: Performed by: HOSPITALIST

## 2025-07-28 PROCEDURE — 99232 SBSQ HOSP IP/OBS MODERATE 35: CPT | Performed by: INTERNAL MEDICINE

## 2025-07-28 PROCEDURE — 94799 UNLISTED PULMONARY SVC/PX: CPT

## 2025-07-28 PROCEDURE — 99239 HOSP IP/OBS DSCHRG MGMT >30: CPT | Performed by: INTERNAL MEDICINE

## 2025-07-28 PROCEDURE — 84100 ASSAY OF PHOSPHORUS: CPT | Performed by: INTERNAL MEDICINE

## 2025-07-28 PROCEDURE — 25010000002 HEPARIN (PORCINE) PER 1000 UNITS: Performed by: INTERNAL MEDICINE

## 2025-07-28 PROCEDURE — 83735 ASSAY OF MAGNESIUM: CPT | Performed by: INTERNAL MEDICINE

## 2025-07-28 PROCEDURE — 85027 COMPLETE CBC AUTOMATED: CPT | Performed by: INTERNAL MEDICINE

## 2025-07-28 PROCEDURE — 80048 BASIC METABOLIC PNL TOTAL CA: CPT | Performed by: INTERNAL MEDICINE

## 2025-07-28 RX ORDER — ROSUVASTATIN CALCIUM 20 MG/1
10 TABLET, COATED ORAL NIGHTLY
Qty: 90 TABLET | Refills: 3 | Status: SHIPPED | OUTPATIENT
Start: 2025-07-28

## 2025-07-28 RX ORDER — PANTOPRAZOLE SODIUM 40 MG/1
40 TABLET, DELAYED RELEASE ORAL
Start: 2025-07-29

## 2025-07-28 RX ORDER — LOSARTAN POTASSIUM 25 MG/1
50 TABLET ORAL
Start: 2025-07-28

## 2025-07-28 RX ADMIN — ARFORMOTEROL TARTRATE 15 MCG: 15 SOLUTION RESPIRATORY (INHALATION) at 10:20

## 2025-07-28 RX ADMIN — LOSARTAN POTASSIUM 50 MG: 50 TABLET, FILM COATED ORAL at 13:03

## 2025-07-28 RX ADMIN — PANTOPRAZOLE SODIUM 40 MG: 40 TABLET, DELAYED RELEASE ORAL at 05:48

## 2025-07-28 RX ADMIN — ONDANSETRON 4 MG: 2 INJECTION INTRAMUSCULAR; INTRAVENOUS at 10:33

## 2025-07-28 RX ADMIN — ACETAMINOPHEN 650 MG: 325 TABLET ORAL at 05:53

## 2025-07-28 RX ADMIN — HEPARIN SODIUM 5000 UNITS: 5000 INJECTION INTRAVENOUS; SUBCUTANEOUS at 13:04

## 2025-07-28 RX ADMIN — CARVEDILOL 6.25 MG: 6.25 TABLET, FILM COATED ORAL at 07:58

## 2025-07-28 RX ADMIN — CLOPIDOGREL BISULFATE 75 MG: 75 TABLET, FILM COATED ORAL at 13:03

## 2025-07-28 RX ADMIN — TOBRAMYCIN 300 MG: 300 SOLUTION RESPIRATORY (INHALATION) at 10:36

## 2025-07-28 RX ADMIN — HEPARIN SODIUM 5000 UNITS: 5000 INJECTION INTRAVENOUS; SUBCUTANEOUS at 05:54

## 2025-07-28 RX ADMIN — ASPIRIN 81 MG: 81 TABLET, COATED ORAL at 13:03

## 2025-07-28 RX ADMIN — TROLAMINE SALICYLATE 1 APPLICATION: 100 CREAM TOPICAL at 14:38

## 2025-07-28 RX ADMIN — HEPARIN SODIUM 3200 UNITS: 1000 INJECTION INTRAVENOUS; SUBCUTANEOUS at 11:42

## 2025-07-28 RX ADMIN — Medication 10 ML: at 13:05

## 2025-07-28 RX ADMIN — BUDESONIDE 0.5 MG: 0.5 SUSPENSION RESPIRATORY (INHALATION) at 10:20

## 2025-07-28 RX ADMIN — LEVOTHYROXINE SODIUM 50 MCG: 0.05 TABLET ORAL at 05:54

## 2025-07-28 RX ADMIN — TROLAMINE SALICYLATE 1 APPLICATION: 100 CREAM TOPICAL at 05:53

## 2025-07-28 NOTE — PLAN OF CARE
Goal Outcome Evaluation:   Patient w c/o back pain in a.m. Tylenol and aspercreme given as ordered. Dialysis in a.m. No issues overnight. Patient slept well.

## 2025-07-28 NOTE — PLAN OF CARE
Goal Outcome Evaluation:  Plan of Care Reviewed With: patient        Progress: improving  Outcome Evaluation: Pt dc to [facility]                              Spontaneous, unlabored and symmetrical

## 2025-07-28 NOTE — NURSING NOTE
Duration of Treatment 3.5 Hours   Access Site Tunneled Dialysis Catheter   Dialyzer Revaclear    mL/min   Dialysate Temperature (C) 36   BFR-As tolerated to a maximum of: 400 mL/min   Prime Dialyzer With NS to Priming Volume? Yes   Dialysate Solution Bath: K+ = 3 mEq, CA = 2.5mg   Bicarb 30 meq   Na+ 137 meq   Fluid Removal: 2.5 L   Pt had difficulty tolerating treatment. Per her request, pt ended treatment approximately 25 minutes early due to cramping, and nausea. Please read dialysis notes. Removed 1.7L, bvp-72.6L, and ran 3hrs 7minutes. Report given to BILL Palma. Pt to rm [phone] via transport team

## 2025-07-28 NOTE — PROGRESS NOTES
Pulmonary / Critical Care Progress Note      Patient Name: Charlette Rai  : 1935  MRN: [id number]  Primary Care Physician:  Brennan Mosqueda MD  Date of admission: 2025    Subjective   Subjective   Follow-up for hypoxic respiratory failure, edema, ESRD on HD    Going to dialysis today  On 3 L of oxygen  Weak, frail and bedridden  No chest pain  Dyspnea better  No fevers or chills  No cough      Objective   Objective     Vitals:   Temp:  [97.3 °F (36.3 °C)-98.2 °F (36.8 °C)] 97.9 °F (36.6 °C)  Heart Rate:  [64-88] 68  Resp:  [16-20] 16  BP: (121-213)/(48-75) 169/55  Flow (L/min) (Oxygen Therapy):  [3] 3  Physical Exam   Vital Signs Reviewed   General:  WDWN, Alert, NAD.  Chronically ill-appearing female, lying in bed  HEENT:  PERRL, EOMI.  OP, nares clear,   Chest:  good aeration, diminished on auscultation bilaterally  CV: RRR, no MGR, pulses 2+, equal.  Abd:  Soft, NT, ND, + BS, no HSM  EXT:  no clubbing, no cyanosis, no edema  Neuro:  A&Ox3, CN grossly intact, no focal deficits.  Skin: No rashes or lesions noted      Result Review    Result Review:  I have personally reviewed the results from the time of this admission to 2025 15:08 EDT and agree with these findings:  [x]  Laboratory  [x]  Microbiology  [x]  Radiology  [x]  EKG/Telemetry   []  Cardiology/Vascular   []  Pathology  []  Old records  []  Other:  Most notable findings include:         Lab 25  0507 25  0504 25  0510 25  0510 25  1930 25  1001   WBC 5.84 4.51 5.49 6.26 5.90  --    HEMOGLOBIN 11.4* 12.3 11.5* 12.3 11.9* 7.9*   HEMATOCRIT 36.7 39.4 37.6 41.1 39.3 27.4*   PLATELETS 109* 118* 133* 139* 160  --    SODIUM 131* 132* 138 140 143  --    POTASSIUM 4.1 4.0 4.1 4.9 4.5  --    CHLORIDE 99 100 104 109* 107  --    CO2 22.5 23.9 23.9 19.3* 25.3  --    BUN 60.5* 20.5 28.7* 29.0* 25.1*  --    CREATININE 2.41* 1.60* 2.16* 2.48* 2.61*  --    GLUCOSE 203* 161* 178* 143* 129*  --    CALCIUM 7.9*  7.8* 7.8* 8.1* 8.1*  --    PHOSPHORUS 2.4* 2.7 4.1 4.8*  --   --    TOTAL PROTEIN  --   --   --   --  6.1  --    ALBUMIN  --   --   --   --  2.9*  --    GLOBULIN  --   --   --   --  3.2  --             CT Chest Without Contrast Diagnostic  Result Date: 7/24/2025  CT CHEST WO CONTRAST DIAGNOSTIC Date of Exam: 7/24/2025 2:14 PM EDT Indication: pneumonia. History of lung cancer Comparison: Chest x-ray July 23, 2025 Technique: Axial CT images were obtained of the chest without contrast administration.  Reconstructed coronal and sagittal images were also obtained. Automated exposure control and iterative construction methods were used. Findings: There is atherosclerotic vascular calcification. There is coronary artery calcification. There is volume loss to the left lung secondary to prior left upper lobe lobectomy. There is some pleural-based calcification left hemithorax. There is suggested scarring in the left lower lobe. There are changes in the right perihilar region extending to the pleura in the right upper lobe posteriorly that could reflect treatment related change. This has been noted. In the interval since prior exam there is airspace disease which has developed in the right lower lobe. This may be treatment related. Inflammatory infectious process could not be excluded. There is some scarring at the right base. There is some new pleural-based density in the right basilar area that may reflect sequela to treatment. This is best defined on image [id number]. Lower slices through the upper abdomen reveal no acute changes. There is a central venous catheter with its tip at the caval atrial junction. Visualized osseous structures do not appear unusual for the given patient.     Impression: Impression: 1.There has been interval change to the right lower chest that may reflect posttreatment pneumonitis. An underlying inflammatory infectious process not excluded. 2.Pleural-based density in the right basilar area which  seems changed and may be reflective of posttreatment change to this area 3.Posttreatment changes right perihilar area in right upper lobe. 4.Changes from left upper lobe lobectomy. There is scarring in the left lower lobe 5.Atherosclerotic vascular calcification is present. Electronically Signed: Tony Kurtz MD  7/24/2025 3:20 PM EDT  Workstation ID: [id number]      CT Head Without Contrast  Result Date: 7/17/2025  CT HEAD WO CONTRAST Date of Exam: 7/17/2025 10:17 AM EDT Indication: Lethargic. Comparison: MRI of the brain and CT head from July 14, 2025 Technique: Axial CT images were obtained of the head without contrast administration.  Reconstructed coronal and sagittal images were also obtained. Automated exposure control and iterative construction methods were used. Findings: No acute intracranial hemorrhage or extra-axial collection is identified. The ventricles appear normal in caliber, with no evidence of mass effect or midline shift. The basal cisterns appear patent. The gray-white differentiation appears preserved. The calvarium appear intact. The paranasal sinuses are clear. There are bilateral mastoid effusions worse on the right. Patchy areas of periventricular and subcortical white matter hypodensities are nonspecific, but likely the sequela of moderate chronic  small vessel ischemic disease.     Impression: Impression: 1.No acute intracranial process identified. 2.Findings suggestive of moderate chronic small vessel ischemic disease. 3.Bilateral mastoid effusions worse on the right. Electronically Signed: Leonardo Herbert MD  7/17/2025 10:48 AM EDT  Workstation ID: [id number]    CT Head Without Contrast Stroke Protocol  Result Date: 7/14/2025  CT HEAD WO CONTRAST STROKE PROTOCOL Date of Exam: 7/14/2025 10:42 AM EDT Indication: AMS. Comparison: 7/9/2025 Technique: Axial CT images were obtained of the head without contrast administration.  Reconstructed coronal and sagittal images were also obtained.  Automated exposure control and iterative construction methods were used. Scan Time: 10:40 a.m. Results discussed with the patient's floor nurse at 10:56 a.m. Findings: No intracranial hemorrhage. No edema or mass effect. No loss of gray-white differentiation. Chronic small vessel ischemic changes in the supratentorial white matter and basal ganglia. CSF spaces stable. No abnormal extra-axial fluid collection     Impression: Impression: 1. No intracranial hemorrhage. 2. No CT changes of acute major vascular territory brain ischemia at this time Electronically Signed: Karlo Emre  7/14/2025 10:56 AM EDT  Workstation ID: [id number]    CT Head Without Contrast  Result Date: 7/9/2025  CT HEAD WO CONTRAST Date of Exam: 7/9/2025 5:39 PM EDT Indication: Altered mental status. Comparison: CT head June 6, 2025 Technique: Axial CT images were obtained of the head without contrast administration.  Reconstructed coronal and sagittal images were also obtained. Automated exposure control and iterative construction methods were used. Findings: There are periventricular and deep white matter changes which could reflect more chronic small vessel ischemic change. There is vascular calcification in the area of the carotid siphon and distal left vertebral artery. There is bilateral mastoid disease.  There is right middle ear disease surrounding the ossicles. An incidental osteoma is noted in the right frontal sinus.     Impression: Impression: 1.There are periventricular and deep white matter changes which could reflect more chronic small vessel ischemic change. 2.Atherosclerotic vascular calcification. 3.Bilateral mastoid disease. There is right middle ear disease surrounding the ossicles. 4.Incidental osteoma right frontal sinus. Electronically Signed: Tony Kurtz MD  7/9/2025 5:54 PM EDT  Workstation ID: [id number]       Results for orders placed during the hospital encounter of 04/22/25    Adult Transthoracic Echo Complete W/ Cont if  Necessary Per Protocol    Interpretation Summary    Left ventricular systolic function is normal. Calculated left ventricular EF = 60.5%    Left ventricular diastolic function is consistent with (grade Ia w/high LAP) impaired relaxation.    The left atrial cavity is mildly dilated.    Mild to moderate aortic valve stenosis is present.    Aortic valve mean pressure gradient is 16 mmHg.    Mild to moderate mitral valve stenosis is present.    Estimated right ventricular systolic pressure from tricuspid regurgitation is mildly elevated (35-45 mmHg).       Assessment & Plan   Assessment / Plan     Active Hospital Problems:  Active Hospital Problems    Diagnosis     **Acute respiratory failure          Impression:   Right lower lobe airspace disease secondary to pneumonitis and postradiation changes  Shortness of breath secondary to above and likely volume overload  ESRD on hemodialysis  Generalized weakness  Chronic hypoxic respiratory failure  Anemia of chronic disease  Hypertension  Chronic diastolic heart failure, not acutely exacerbated  Severe protein calorie malnutrition  History of renal artery stenosis  Hypothyroidism       Plan:   Wean O2 to keep SPO2 greater than 90%  Continue Brovana, Pulmicort, and DuoNebs  Continue JUAN nebs.  Continue to cycle 30 days on 30 days off  Timing of dialysis per nephrology. To be dialyzed again today  Trend renal panel and electrolytes  Encourage activity as tolerated and incentive spirometer use    VTE Prophylaxis:  Pharmacologic & mechanical VTE prophylaxis orders are present.    CODE STATUS:   Code Status (Patient has no pulse and is not breathing): No CPR (Do Not Attempt to Resuscitate)  Medical Interventions (Patient has pulse or is breathing): Limited Support  Medical Intervention Limits: No intubation (DNI)      Labs, imaging, microbiology, notes and medications personally reviewed  Discussed with primary      Electronically signed by Clement Motta MD, 7/28/2025, 15:08  EDT.

## 2025-07-28 NOTE — PROGRESS NOTES
[facility]   Hospitalist Progress Note  Date: 2025  Patient Name: Charlette Rai  : 1935  MRN: [id number]  Date of admission: 2025  Consultants:   -Nephrology: Dr. Elliott Aviles, Dr. Edi García, Dr. Karlo Guevara  -Pulmonology: Dr. Regis Holcomb, Dr. Khalif Yanez    Subjective   Subjective     Chief Complaint: Shortness of breath    Summary:   Charlette Rai is a 89 y.o. female with PAD, renal artery stenosis s/p stenting, hypertension, ESRD on hemodialysis, chronic diastolic heart failure, type 2 diabetes mellitus and hypothyroidism presented with complaints of shortness of breath.  CXR obtained and no acute abnormalities were noted.  Patient placed on supplemental O2 to maintain sats.  CT abdomen pelvis did show right lower lobe pneumonia.  Antibiotic started.  Nephrology and pulmonology consulted.  Patient underwent several consecutive days of dialysis.  Shortness of breath did not improve.  Pulmonology noted that low concern for active pneumonia and antibiotics were discontinued.  Noted that right lower lobe airspace disease likely represented pneumonitis and postradiation changes.    Interval Followup:   No acute issues overnight.  Patient hemodialysis and tolerating well.  She says that she is feeling okay just little tired today.  Nursing with no additional acute issues to report.    Objective   Objective     Vitals:   Temp:  [97.3 °F (36.3 °C)-98.2 °F (36.8 °C)] 98.2 °F (36.8 °C)  Heart Rate:  [64-88] 64  Resp:  [16-18] 16  BP: (155-213)/(50-75) 172/75  Flow (L/min) (Oxygen Therapy):  [3] 3  Physical Exam   Gen: Sitting up in bed on dialysis, chronically ill-appearing elderly female, pleasant  Resp: No increase in work of breathing, equal chest rise bilaterally  Card: RRR, No m/r/g  Abd: Soft, Nontender, Nondistended, + bowel sounds    Result Review    Result Review:  I have personally reviewed the results as below and agree with these findings:  []  Laboratory:   CMP           7/25/2025    05:10 7/26/2025    05:04 7/28/2025    05:07   CMP   Glucose 178  161  203    BUN 28.7  20.5  60.5    Creatinine 2.16  1.60  2.41    EGFR 21.5  30.9  18.9    Sodium 138  132  131    Potassium 4.1  4.0  4.1    Chloride 104  100  99    Calcium 7.8  7.8  7.9    BUN/Creatinine Ratio 13.3  12.8  25.1    Anion Gap 10.1  8.1  9.5      CBC          7/25/2025    05:10 7/26/2025    05:04 7/28/2025    05:07   CBC   WBC 5.49  4.51  5.84    RBC 3.59  3.79  3.59    Hemoglobin 11.5  12.3  11.4    Hematocrit 37.6  39.4  36.7    .7  104.0  102.2    MCH 32.0  32.5  31.8    MCHC 30.6  31.2  31.1    RDW 21.1  20.5  19.2    Platelets 133  118  109    Phosphorus and magnesium level stable  [x]  Microbiology: Blood culture (07/24/2025): No growth to date  []  Radiology:   [x]  EKG/Telemetry:    []  Cardiology/Vascular:    []  Pathology:  []  Old records:  []  Other:    Assessment & Plan   Assessment / Plan     Assessment:  Right lower lobe airspace disease secondary to pneumonitis and postradiation changes  Shortness of breath secondary to above and likely volume overload  ESRD on hemodialysis  Generalized weakness  Chronic hypoxic respiratory failure  Anemia of chronic disease  Hypertension  Chronic diastolic heart failure, not acutely exacerbated  Severe protein calorie malnutrition  History of renal artery stenosis  Hypothyroidism    Plan:  -Nephrology and pulmonology consulted and following, appreciate assistance and recommendations in the care of this patient.  -Continue supplemental O2 to maintain sats greater than 90%, wean as tolerated  -Patient have dialysis Monday/Wednesday/Friday per nephrology  -Continue Brovana, Pulmicort  -Continue JUAN nebs  -Stop IV Solu-Medrol  -Encourage use of incentive spirometer and flutter valve  -Labs stable and reviewed.  Will give patient lab holiday on 07/29/2025 unless there is an acute change in the patient's condition  -Clinical course will dictate further management     DVT  Prophylaxis: SCDs, heparin  GI Prophylaxis: Pantoprazole  Diet:   Diet Order   Procedures    Diet: Renal; Low Sodium (2-3g), Low Potassium, Low Phosphorus; Texture: Soft to Chew (NDD 3); Soft to Chew: Whole Meat; Fluid Consistency: Thin (IDDSI 0)     Dispo: Return to [facility] when medically appropriate for discharge.  Pre-CERT has been started.        Personally reviewed patients labs and imaging, discussed with patient and nurse at bedside. Discussed management with the following consultants: Nephrology and pulmonology.     Part of this note may be an electronic transcription/translation of spoken language to printed text using the Dragon dictation system.    VTE Prophylaxis:  Pharmacologic & mechanical VTE prophylaxis orders are present.        CODE STATUS:   Code Status (Patient has no pulse and is not breathing): No CPR (Do Not Attempt to Resuscitate)  Medical Interventions (Patient has pulse or is breathing): Limited Support  Medical Intervention Limits: No intubation (DNI)        Electronically signed by Pieter Salgado MD, 7/28/2025, 09:57 EDT.

## 2025-07-28 NOTE — PROGRESS NOTES
[facility]     Nephrology Progress Note      Patient Name: Charlette Rai  : 1935  MRN: [id number]  Primary Care Physician:  Brennan Mosqueda MD  Date of admission: 2025    Subjective   Subjective     Interval History:    Stable overnight  No swelling  No chills    Objective   Objective     Vitals:   Temp:  [97.3 °F (36.3 °C)-98.2 °F (36.8 °C)] 97.9 °F (36.6 °C)  Heart Rate:  [64-88] 68  Resp:  [16-20] 16  BP: (121-213)/(48-75) 169/55  Flow (L/min) (Oxygen Therapy):  [3] 3  Physical Exam:   Constitutional: Awake, alert   Eyes: sclerae anicteric, no conjunctival injection   HENT: mucous membranes moist   Neck: Supple, no thyromegaly, no lymphadenopathy, trachea midline, No JVD   Respiratory: Decreased breath sounds bilaterally, , no wheezing.      Cardiovascular: RRR, no murmurs, rubs, or gallops.   Gastrointestinal: Positive bowel sounds, soft, nontender, nondistended   Musculoskeletal: No edema, no clubbing or cyanosis   Psychiatric: Appropriate affect, cooperative   Neurologic: Oriented x 3, moving all extremities, Cranial Nerves grossly intact, speech clear   Skin: warm and dry, no rashes    Right IJ TDC in place.    Result Review    Result Reviewed:  I have personally reviewed the results from the time of this admission to 2025 12:50 EDT and agree with these findings:  [x]  Laboratory  []  Microbiology  [x]  Radiology  []  EKG/Telemetry   []  Cardiology/Vascular   []  Pathology  [x]  Old records  []  Other:        Lab 25  0507 25  0504 25  0510 25  0510 25  1930   SODIUM 131* 132* 138 140 143   POTASSIUM 4.1 4.0 4.1 4.9 4.5   CHLORIDE 99 100 104 109* 107   CO2 22.5 23.9 23.9 19.3* 25.3   BUN 60.5* 20.5 28.7* 29.0* 25.1*   CREATININE 2.41* 1.60* 2.16* 2.48* 2.61*   GLUCOSE 203* 161* 178* 143* 129*   EGFR 18.9* 30.9* 21.5* 18.3* 17.2*   ANION GAP 9.5 8.1 10.1 11.7 10.7   MAGNESIUM 1.9 1.9 2.0 1.9 2.4   PHOSPHORUS 2.4* 2.7 4.1 4.8*  --               Assessment & Plan   Assessment / Plan       Active Hospital Problems:  Active Hospital Problems    Diagnosis     **Acute respiratory failure    ESRD  Renal artery stenosis  Severe hypertension  Anemia of CKD    Assessment and Plan:      - ESRD, dialysis through right IJ TDC 3 times a week.  Here with acute respiratory failure, pneumonia and volume overload.  HD MWF schedule    - Acute respiratory failure secondary to volume overload and possible right lower lobe pneumonia.  Pulmonary consulted.    - Anemia of CKD.  Hemoglobin above goal.  Was on CASSANDRA . Resume when hemoglobin closer to 10.  Continue oral iron.  Monitor.     - Secondary hyperparathyroidism and hyperphosphatemia, being addressed with dialysis.  Low phosphorus diet.       - Severe peripheral arterial disease including lower extremities and renal artery stenosis, status post PTA and stent 6/14/2024.       - Lung cancer.  Status post radiation.     - Type 2 diabetes with complications, per primary.

## 2025-07-28 NOTE — DISCHARGE SUMMARY
[facility]         HOSPITALIST  DISCHARGE SUMMARY    Patient Name: Charlette Rai  : 1935  MRN: [id number]    Date of Admission: 2025  Date of Discharge:  25  Primary Care Physician: Brennan Mosqueda MD    Consultants:  -Nephrology: Dr. Elliott Aviles, Dr. Edi García, Dr. Karlo Guevara  -Pulmonology: Dr. Regis Holcomb, Dr. Khalif Yanez, Dr. Clement Motta    [facility] Problems:  Right lower lobe airspace disease secondary to pneumonitis and postradiation changes  Shortness of breath secondary to above and likely volume overload  ESRD on hemodialysis  Generalized weakness  Chronic hypoxic respiratory failure  Anemia of chronic disease  Hypertension  Chronic diastolic heart failure, not acutely exacerbated  Severe protein calorie malnutrition  History of renal artery stenosis  Hypothyroidism    Hospital Course     Hospital Course:  Charlette Rai is a 89 y.o. female with PAD, renal artery stenosis s/p stenting, hypertension, ESRD on hemodialysis, chronic diastolic heart failure, type 2 diabetes mellitus and hypothyroidism presented with complaints of shortness of breath. CXR obtained and no acute abnormalities were noted. Patient placed on supplemental O2 to maintain sats. CT abdomen pelvis did show right lower lobe pneumonia. Antibiotic started. Nephrology and pulmonology consulted. Patient underwent several consecutive days of dialysis. Shortness of breath improved. Pulmonology noted that low concern for active pneumonia and antibiotics were discontinued. Noted that right lower lobe airspace disease likely represented pneumonitis and postradiation changes.  Due to patient's significant medical comorbidities she is very high risk for readmission to the hospital.  On day of discharge patient hemodynamically stable and no additional inpatient evaluation or workup necessary at this time, patient discharged back to rehab facility and to follow-up with PCP, pulmonology and  nephrology.    DISCHARGE Follow Up Recommendations for labs and diagnostics:   -Follow-up with PCP in 3 to 5 days  -Follow-up with pulmonology 2 weeks  -Follow-up with nephrology in 2 weeks    Day of Discharge     Vital Signs:  Temp:  [97.3 °F (36.3 °C)-98.2 °F (36.8 °C)] 97.9 °F (36.6 °C)  Heart Rate:  [64-88] 68  Resp:  [16-20] 16  BP: (121-213)/(48-75) 169/55  Flow (L/min) (Oxygen Therapy):  [3] 3  Physical Exam:   Gen: Pleasant, sitting up in bed on dialysis, chronically ill-appearing elderly female  Resp: No increase in work of breathing, equal chest rise bilaterally  Card: RRR, No m/r/g  Abd: Soft, Nontender, Nondistended, + bowel sounds     Discharge Details        Discharge Medications        New Medications        Instructions Start Date   pantoprazole 40 MG EC tablet  Commonly known as: PROTONIX  Replaces: dexlansoprazole 60 MG capsule   40 mg, Oral, Every Early Morning   Start Date: July 29, 2025            Changes to Medications        Instructions Start Date   losartan 25 MG tablet  Commonly known as: COZAAR  What changed: how much to take   50 mg, Oral, Every 24 Hours Scheduled             Continue These Medications        Instructions Start Date   acetaminophen 325 MG tablet  Commonly known as: TYLENOL   650 mg, Every 6 Hours PRN      albuterol sulfate  (90 Base) MCG/ACT inhaler  Commonly known as: PROVENTIL HFA;VENTOLIN HFA;PROAIR HFA   2 puffs, Inhalation, Every 4 Hours PRN      aspirin 81 MG EC tablet   81 mg, Daily      budesonide 0.5 MG/2ML nebulizer solution  Commonly known as: Pulmicort   0.5 mg, Nebulization, 2 Times Daily - RT      carvedilol 6.25 MG tablet  Commonly known as: COREG   6.25 mg, Oral, 2 Times Daily With Meals      clopidogrel 75 MG tablet  Commonly known as: Plavix   75 mg, Oral, Daily      ferrous gluconate 324 MG tablet  Commonly known as: FERGON   324 mg, Oral, 3 Times Weekly      fluticasone 50 MCG/ACT nasal spray  Commonly known as: FLONASE   Administer 1 spray into  the nostril(s) as directed by provider Daily.      furosemide 20 MG tablet  Commonly known as: LASIX   20 mg, Oral, Daily      ipratropium-albuterol 0.5-2.5 mg/3 ml nebulizer  Commonly known as: DUO-NEB   3 mL, Nebulization, Every 6 Hours PRN      isosorbide mononitrate 30 MG 24 hr tablet  Commonly known as: IMDUR   30 mg, Oral, Nightly      levothyroxine 50 MCG tablet  Commonly known as: SYNTHROID, LEVOTHROID   1 tablet, Daily      lidocaine-prilocaine 2.5-2.5 % cream  Commonly known as: EMLA   Apply 1 Application topically to the appropriate area as directed Take As Directed. Apply to dialysis access site 30-45 minutes prior to dialysis      menthol-zinc oxide 0.44-20.625 % ointment ointment  Commonly known as: CALMOSEPTINE   1 Application, Every 12 Hours Scheduled      MIRCERA IJ   200 mcg, Every 14 Days      rosuvastatin 20 MG tablet  Commonly known as: CRESTOR   10 mg, Oral, Nightly      tobramycin  MG/5ML nebulizer solution  Commonly known as: JUAN   300 mg, Nebulization, 2 Times Daily - RT, 30 days on 30 days off      vitamin D 1.25 MG (79929 UT) capsule capsule  Commonly known as: ERGOCALCIFEROL   50,000 Units, Oral, Every 14 Days             Stop These Medications      atorvastatin 40 MG tablet  Commonly known as: LIPITOR     dexlansoprazole 60 MG capsule  Commonly known as: DEXILANT  Replaced by: pantoprazole 40 MG EC tablet     omeprazole 20 MG capsule  Commonly known as: priLOSEC              No Known Allergies    Discharge Disposition:      Diet:  Hospital:  Diet Order   Procedures    Diet: Renal; Low Sodium (2-3g), Low Potassium, Low Phosphorus; Texture: Soft to Chew (NDD 3); Soft to Chew: Whole Meat; Fluid Consistency: Thin (IDDSI 0)       Discharge Activity:   Activity Instructions       Activity as Tolerated              CODE STATUS:  Code Status and Medical Interventions: No CPR (Do Not Attempt to Resuscitate); Limited Support; No intubation (DNI)   Ordered at: 07/23/25 2835     Code Status  (Patient has no pulse and is not breathing):    No CPR (Do Not Attempt to Resuscitate)     Medical Interventions (Patient has pulse or is breathing):    Limited Support     Medical Intervention Limits:    No intubation (DNI)       Future Appointments   Date Time Provider Department Center   10/2/2025 11:30 AM León [place] [facility] [place] [facility]   10/21/2025 11:15 AM Khalif Yanez [place] [facility] [place] [facility]   11/13/2025  1:30 PM [facility] CT [address] [place] [address] CT [facility]   11/18/2025  1:30 PM Nevin Starks APRN [facility]       Additional Instructions for the Follow-ups that You Need to Schedule       Discharge Follow-up with PCP   As directed       Currently Documented PCP:    Brennan Mosqueda MD    PCP Phone Number:    [phone]     Follow Up Details: Follow-up in 3-5 days                Pertinent  and/or Most Recent Results     RADIOLOGY:  CT Chest Without Contrast Diagnostic [[id number]] Dylan as Reviewed   Order Status: Completed Collected: 07/24/25 1505    Updated: 07/24/25 1522   Narrative:     CT CHEST WO CONTRAST DIAGNOSTIC    Date of Exam: 7/24/2025 2:14 PM EDT    Indication: pneumonia. History of lung cancer    Comparison: Chest x-ray July 23, 2025    Technique: Axial CT images were obtained of the chest without contrast administration.  Reconstructed coronal and sagittal images were also obtained. Automated exposure control and iterative construction methods were used.      Findings:  There is atherosclerotic vascular calcification. There is coronary artery calcification.    There is volume loss to the left lung secondary to prior left upper lobe lobectomy. There is some pleural-based calcification left hemithorax. There is suggested scarring in the left lower lobe. There are changes in the right perihilar region extending  to the pleura in the right upper lobe posteriorly that could reflect treatment related change. This has been noted. In the interval since prior exam there is airspace disease which has  developed in the right lower lobe. This may be treatment related.  Inflammatory infectious process could not be excluded. There is some scarring at the right base. There is some new pleural-based density in the right basilar area that may reflect sequela to treatment. This is best defined on image [id number].    Lower slices through the upper abdomen reveal no acute changes.    There is a central venous catheter with its tip at the caval atrial junction.    Visualized osseous structures do not appear unusual for the given patient.       Impression:     Impression:  1.There has been interval change to the right lower chest that may reflect posttreatment pneumonitis. An underlying inflammatory infectious process not excluded.  2.Pleural-based density in the right basilar area which seems changed and may be reflective of posttreatment change to this area  3.Posttreatment changes right perihilar area in right upper lobe.  4.Changes from left upper lobe lobectomy. There is scarring in the left lower lobe  5.Atherosclerotic vascular calcification is present.        Electronically Signed: Tony Kurtz MD   7/24/2025 3:20 PM EDT   Workstation ID: [id number]    CT Abdomen Without Contrast [[id number]] Dylan as Reviewed   Order Status: Completed Collected: 07/24/25 0446    Updated: 07/24/25 0458   Narrative:     CT ABDOMEN WO CONTRAST    Date of Exam: 7/24/2025 4:21 AM EDT    Indication: r/o pancreatitis. Elevated lipase. History of end-stage renal disease.    Comparison: 6/24/2025    Technique: Axial CT images were obtained of the abdomen without contrast administration. Reconstructed coronal and sagittal images were also obtained. Automated exposure control and iterative construction methods were used.    Findings:  Again seen are calcified pleural plaques in the left lower hemithorax with subjacent rounded atelectasis/scarring. A small right pleural effusion is slightly worsened. There are new right lower lobe alveolar opacities  compatible with pneumonia. There is  also some atelectasis that is increased in the right lower lobe. Pericardial calcifications may reflect prior pericarditis. There is coronary artery disease, and there are mitral valve annulus calcifications. There is extensive atherosclerotic disease in   the aorta, and bilateral iliac arterial stents are seen. Patency cannot be assessed without contrast.    Gallbladder remains distended, and it contains stones and sludge. Common bile duct is stable in caliber at about 12 mm in diameter. Bilateral renal lesions are unchanged in the short interval. Both kidneys remain nonobstructed. Liver, spleen, and adrenal   glands appear within normal limits. There is no gross CT evidence of acute pancreatitis. Overall, the exam is limited without contrast. There is continued evidence of anasarca. Postoperative changes are again noted near the GE junction. Stomach is  normal except for a small hiatal hernia that is unchanged. No visible small bowel obstruction. Stool burden appears improved to the extent that the colon is visible. There are scattered areas of wall thickening in the ascending and descending colon,  however, that may reflect some colitis. There is also colonic diverticulosis. No adenopathy.   Impression:     1.No gross CT evidence of acute pancreatitis.  2.New right lower lobe pneumonia. Slightly worsened small right pleural effusion.  3.Distended gallbladder containing stones and sludge. Stable common bile duct dilatation.  4.Stool burden appears improved to the extent that the colon is visible. There are scattered areas of wall thickening in the ascending and descending colon, however, that may reflect some colitis.  5.Additional findings as above.        Electronically Signed: Weston Castillo MD   7/24/2025 4:56 AM EDT   Workstation ID: [id number]    XR Chest 1 View [[id number]] Dylan as Reviewed   Order Status: Completed Collected: 07/23/25 1752    Updated: 07/23/25 6478    Narrative:     XR CHEST 1 VW    Date of Exam: 7/23/2025 5:54 PM EDT    Indication: Chest Pain Triage Protocol    Comparison: Chest radiograph 7/9/2025, chest CT 5/6/2025    Findings:  Dual-lumen right IJ central catheter in similar position near cavoatrial junction. Bandlike consolidation in the right perihilar region without significant change. Left upper lobectomy with expected volume loss. Radiographically similar appearing left  pleural parenchymal scarring and pleural calcifications. Similar residual subpleural opacities in the left lower lobe which could reflect atelectasis/scar. Chronic changes of underlying emphysema. Stable cardiomediastinal silhouette. Aortic  atherosclerotic disease. No visualized pneumothorax. No significant right effusion. Degenerative related osseous change.   Impression:     Impression:  Numerous chronic findings radiographically similar to 7/9/2025. No appreciable new or acute finding.      Electronically Signed: Ildefonso Purcell MD   7/23/2025 6:01 PM EDT   Workstation ID: [id number]     LAB RESULTS:      Lab 07/28/25  0507 07/26/25  0504 07/25/25  0510 07/24/25  0510 07/23/25 1930   WBC 5.84 4.51 5.49 6.26 5.90   HEMOGLOBIN 11.4* 12.3 11.5* 12.3 11.9*   HEMATOCRIT 36.7 39.4 37.6 41.1 39.3   PLATELETS 109* 118* 133* 139* 160   NEUTROS ABS  --   --   --  5.93 4.71   IMMATURE GRANS (ABS)  --   --   --  0.05 0.03   LYMPHS ABS  --   --   --  0.15* 0.39*   MONOS ABS  --   --   --  0.10 0.60   EOS ABS  --   --   --  0.00 0.13   .2* 104.0* 104.7* 105.7* 105.4*   PROCALCITONIN  --   --   --  0.36*  --          Lab 07/28/25  0507 07/26/25  0504 07/25/25  0510 07/24/25  0510 07/23/25 1930   SODIUM 131* 132* 138 140 143   POTASSIUM 4.1 4.0 4.1 4.9 4.5   CHLORIDE 99 100 104 109* 107   CO2 22.5 23.9 23.9 19.3* 25.3   ANION GAP 9.5 8.1 10.1 11.7 10.7   BUN 60.5* 20.5 28.7* 29.0* 25.1*   CREATININE 2.41* 1.60* 2.16* 2.48* 2.61*   EGFR 18.9* 30.9* 21.5* 18.3* 17.2*   GLUCOSE 203* 161* 178*  143* 129*   CALCIUM 7.9* 7.8* 7.8* 8.1* 8.1*   MAGNESIUM 1.9 1.9 2.0 1.9 2.4   PHOSPHORUS 2.4* 2.7 4.1 4.8*  --          Lab 07/23/25 1930   TOTAL PROTEIN 6.1   ALBUMIN 2.9*   GLOBULIN 3.2   ALT (SGPT) 90*   AST (SGOT) 69*   BILIRUBIN 0.5   ALK PHOS 335*   LIPASE 101*         Lab 07/23/25  2337 07/23/25  1930   PROBNP  --  47,640.0*   HSTROP T 189* 201*             Lab 07/23/25  0908 07/22/25  0000   FOLATE  --  9.1   VITAMIN B 12  --  1,612*   ABO TYPING O  --    RH TYPING Positive  --    ANTIBODY SCREEN Negative  --          Brief Urine Lab Results  (Last result in the past 365 days)        Color   Clarity   Blood   Leuk Est   Nitrite   Protein   CREAT   Urine HCG        07/09/25 1508 Yellow   Turbid   Small (1+)   Large (3+)   Negative   100 mg/dL (2+)                 Microbiology Results (last 10 days)       Procedure Component Value - Date/Time    Clostridioides difficile Toxin, PCR - Stool, Per Rectum [[id number]] Collected: 07/27/25 1639    Lab Status: Final result Specimen: Stool from Per Rectum Updated: 07/27/25 1817     Toxigenic C. difficile by PCR Negative     027 Toxin Presumptive Negative    Narrative:      The result indicates the absence of toxigenic C. difficile from stool specimen.     Respiratory Panel PCR w/COVID-19(SARS-CoV-2) KYRA/VARGHESE/SHANNON/PAD/COR/XI In-House, NP Swab in UTM/VTM, 2 HR TAT - Swab, Nasopharynx [[id number]]  (Normal) Collected: 07/24/25 1255    Lab Status: Final result Specimen: Swab from Nasopharynx Updated: 07/24/25 1400     ADENOVIRUS, PCR Not Detected     Coronavirus 229E Not Detected     Coronavirus HKU1 Not Detected     Coronavirus NL63 Not Detected     Coronavirus OC43 Not Detected     COVID19 Not Detected     Human Metapneumovirus Not Detected     Human Rhinovirus/Enterovirus Not Detected     Influenza A PCR Not Detected     Influenza B PCR Not Detected     Parainfluenza Virus 1 Not Detected     Parainfluenza Virus 2 Not Detected     Parainfluenza Virus 3 Not Detected      Parainfluenza Virus 4 Not Detected     RSV, PCR Not Detected     Bordetella pertussis pcr Not Detected     Bordetella parapertussis PCR Not Detected     Chlamydophila pneumoniae PCR Not Detected     Mycoplasma pneumo by PCR Not Detected    Narrative:      In the setting of a positive respiratory panel with a viral infection PLUS a negative procalcitonin without other underlying concern for bacterial infection, consider observing off antibiotics or discontinuation of antibiotics and continue supportive care. If the respiratory panel is positive for atypical bacterial infection (Bordetella pertussis, Chlamydophila pneumoniae, or Mycoplasma pneumoniae), consider antibiotic de-escalation to target atypical bacterial infection.    Blood Culture - Blood, Arm, Left [[id number]]  (Normal) Collected: 07/24/25 0952    Lab Status: Preliminary result Specimen: Blood from Arm, Left Updated: 07/28/25 1015     Blood Culture No growth at 4 days    Narrative:      Less than seven (7) mL's of blood was collected.  Insufficient quantity may yield false negative results.    Blood Culture - Blood, Arm, Left [[id number]]  (Normal) Collected: 07/24/25 0952    Lab Status: Preliminary result Specimen: Blood from Arm, Left Updated: 07/28/25 1015     Blood Culture No growth at 4 days    Narrative:      Less than seven (7) mL's of blood was collected.  Insufficient quantity may yield false negative results.    MRSA Screen, PCR (Inpatient) - Swab, Nares [[id number]]  (Normal) Collected: 07/24/25 0629    Lab Status: Final result Specimen: Swab from Nares Updated: 07/24/25 0810     MRSA PCR No MRSA Detected    Narrative:      The negative predictive value of this diagnostic test is high and should only be used to consider de-escalating anti-MRSA therapy. A positive result may indicate colonization with MRSA and must be correlated clinically.              Results for orders placed during the hospital encounter of 07/09/25    Duplex Upper Extremity  Art / Grafts - Right CAR 07/17/2025 12:25 PM    Interpretation Summary    Normal right upper extremity arterial duplex exam.      Results for orders placed during the hospital encounter of 07/09/25    Duplex Upper Extremity Art / Grafts - Right CAR 07/17/2025 12:25 PM    Interpretation Summary    Normal right upper extremity arterial duplex exam.      Results for orders placed during the hospital encounter of 04/22/25    Adult Transthoracic Echo Complete W/ Cont if Necessary Per Protocol 04/23/2025  2:37 PM    Interpretation Summary    Left ventricular systolic function is normal. Calculated left ventricular EF = 60.5%    Left ventricular diastolic function is consistent with (grade Ia w/high LAP) impaired relaxation.    The left atrial cavity is mildly dilated.    Mild to moderate aortic valve stenosis is present.    Aortic valve mean pressure gradient is 16 mmHg.    Mild to moderate mitral valve stenosis is present.    Estimated right ventricular systolic pressure from tricuspid regurgitation is mildly elevated (35-45 mmHg).      Labs Pending at Discharge:  Pending Labs       Order Current Status    Blood Culture - Blood, Arm, Left Preliminary result    Blood Culture - Blood, Arm, Left Preliminary result            Time spent on Discharge including face to face service:  32 minutes    Electronically signed by Pieter Salgado MD, 07/28/25, 1:33 PM EDT.

## 2025-07-29 LAB
BACTERIA SPEC AEROBE CULT: NORMAL
BACTERIA SPEC AEROBE CULT: NORMAL

## 2025-08-05 ENCOUNTER — TELEPHONE (OUTPATIENT)
Facility: HOSPITAL | Age: 89
End: 2025-08-05
Payer: MEDICARE

## 2025-08-05 RX ORDER — DEXLANSOPRAZOLE 60 MG/1
1 CAPSULE, DELAYED RELEASE ORAL DAILY
Qty: 90 CAPSULE | Refills: 3 | Status: SHIPPED | OUTPATIENT
Start: 2025-08-05

## 2025-08-08 LAB
QT INTERVAL: 390 MS
QTC INTERVAL: 491 MS

## 2025-08-16 ENCOUNTER — APPOINTMENT (OUTPATIENT)
Dept: GENERAL RADIOLOGY | Facility: HOSPITAL | Age: 89
DRG: 871 | End: 2025-08-16
Payer: MEDICARE

## 2025-08-16 ENCOUNTER — HOSPITAL ENCOUNTER (EMERGENCY)
Facility: HOSPITAL | Age: 89
Discharge: HOME | DRG: 871 | End: 2025-08-17
Attending: EMERGENCY MEDICINE
Payer: MEDICARE

## 2025-08-19 PROBLEM — J96.01 ACUTE HYPOXEMIC RESPIRATORY FAILURE: Status: ACTIVE | Noted: 2025-01-01

## (undated) DEVICE — SUT PROLN 6/0 C1 D/A 30IN 8706H

## (undated) DEVICE — Device

## (undated) DEVICE — R2P DESTINATION SLENDER GUIDING SHEATH: Brand: R2P DESTINATION SLENDER

## (undated) DEVICE — SUT SILK 2/0 TIES 18IN A185H

## (undated) DEVICE — ADHS SKIN PREMIERPRO EXOFIN TOPICAL HI/VISC .5ML

## (undated) DEVICE — SUT SILK 3/0 TIES 18IN A184H

## (undated) DEVICE — RADIFOCUS GLIDECATH: Brand: GLIDECATH

## (undated) DEVICE — SUT VIC 3/0 SH 27IN J416H

## (undated) DEVICE — STERILE POLYISOPRENE POWDER-FREE SURGICAL GLOVES: Brand: PROTEXIS

## (undated) DEVICE — ANTIBACTERIAL UNDYED BRAIDED (POLYGLACTIN 910), SYNTHETIC ABSORBABLE SUTURE: Brand: COATED VICRYL

## (undated) DEVICE — ANGIOGRAPHY PACK: Brand: MEDLINE INDUSTRIES, INC.

## (undated) DEVICE — GLV SURG SENSICARE PI ORTHO PF SZ7 LF STRL

## (undated) DEVICE — SUT PROLN 7/0 BV1 D/A 24IN 8702H

## (undated) DEVICE — GLIDESHEATH SLENDER TIBIAL PEDAL KIT, 40MM: Brand: GLIDESHEATH SLENDER TIBIAL PEDAL KIT

## (undated) DEVICE — RADIFOCUS GLIDEWIRE: Brand: GLIDEWIRE

## (undated) DEVICE — ARM VASCULAR-LF: Brand: MEDLINE INDUSTRIES, INC.

## (undated) DEVICE — SOLIDIFIER LIQLOC PLS 1500CC BT

## (undated) DEVICE — DEV ATOMIZATION MUCOSAL/NASALTRACH

## (undated) DEVICE — Device: Brand: OMNIWIRE PRESSURE GUIDE WIRE

## (undated) DEVICE — LINER SURG CANSTR SXN S/RIGD 1500CC

## (undated) DEVICE — DEFENDO AIR WATER SUCTION AND BIOPSY VALVE KIT FOR  OLYMPUS: Brand: DEFENDO AIR/WATER/SUCTION AND BIOPSY VALVE

## (undated) DEVICE — ELECTRD BLD EDGE COAT 3IN

## (undated) DEVICE — SINGLE USE SUCTION VALVE MAJ-209: Brand: SINGLE USE SUCTION VALVE (STERILE)

## (undated) DEVICE — GLV SURG SENSICARE PI ORTHO SZ8 LF STRL

## (undated) DEVICE — SUT MNCRYL PLS ANTIB UD 4/0 PS2 18IN

## (undated) DEVICE — SINGLE USE BIOPSY VALVE MAJ-210: Brand: SINGLE USE BIOPSY VALVE (STERILE)

## (undated) DEVICE — SOL IRRG H2O PL/BG 1000ML STRL

## (undated) DEVICE — CLAMP INSERT: Brand: STEALTH® CLAMP INSERT

## (undated) DEVICE — SLV SCD KN/LEN ADJ EXPRSS BLENDED MD 1P/U

## (undated) DEVICE — SOL NS 500ML

## (undated) DEVICE — SUT ETHLN 2/0 FS 18IN 664H

## (undated) DEVICE — THE STERILE LIGHT HANDLE COVER IS USED WITH STERIS SURGICAL LIGHTING AND VISUALIZATION SYSTEMS.

## (undated) DEVICE — CONTAINER,SPEC,PNEUM TUBE,4OZ,STRL PATH: Brand: MEDLINE

## (undated) DEVICE — GLV SURG SENSICARE PI ORTHO SZ7.5 LF STRL

## (undated) DEVICE — BLCK/BITE BLOX WO/DENTL/RIM W/STRAP 54F

## (undated) DEVICE — NAVICROSS SUPPORT CATHETER: Brand: NAVICROSS

## (undated) DEVICE — PENCL E/S SMOKEEVAC W/TELESCP CANN

## (undated) DEVICE — BALN SFT VU COBRA2 .035IN 5F 65CM

## (undated) DEVICE — LIGHT SLEEVE: Brand: DEVON

## (undated) DEVICE — GLIDEPATH HEMODIALYSIS CATH, ST, DL 14.5 FR. 19CM: Brand: GLIDEPATH LONG-TERM HEMODIALYSIS CATHETER WITH PRELOADED STYLET

## (undated) DEVICE — GUIDEWIRE WITH ICE™ HYDROPHILIC COATING: Brand: V-18™ CONTROL WIRE™

## (undated) DEVICE — CONN JET HYDRA H20 AUXILIARY DISP

## (undated) DEVICE — PTA BALLOON DILATATION CATHETER: Brand: STERLING™